# Patient Record
Sex: MALE | Race: WHITE | NOT HISPANIC OR LATINO | Employment: OTHER | URBAN - METROPOLITAN AREA
[De-identification: names, ages, dates, MRNs, and addresses within clinical notes are randomized per-mention and may not be internally consistent; named-entity substitution may affect disease eponyms.]

---

## 2017-01-05 ENCOUNTER — TRANSFERRED RECORDS (OUTPATIENT)
Dept: HEALTH INFORMATION MANAGEMENT | Facility: CLINIC | Age: 37
End: 2017-01-05

## 2017-01-09 ENCOUNTER — OFFICE VISIT (OUTPATIENT)
Dept: FAMILY MEDICINE | Facility: CLINIC | Age: 37
End: 2017-01-09
Payer: COMMERCIAL

## 2017-01-09 VITALS
HEART RATE: 70 BPM | TEMPERATURE: 97.8 F | SYSTOLIC BLOOD PRESSURE: 120 MMHG | DIASTOLIC BLOOD PRESSURE: 74 MMHG | OXYGEN SATURATION: 97 % | WEIGHT: 315 LBS | HEIGHT: 72 IN | BODY MASS INDEX: 42.66 KG/M2

## 2017-01-09 DIAGNOSIS — E66.01 MORBID OBESITY DUE TO EXCESS CALORIES (H): ICD-10-CM

## 2017-01-09 DIAGNOSIS — F70 MILD INTELLECTUAL DISABILITY: ICD-10-CM

## 2017-01-09 DIAGNOSIS — F17.200 TOBACCO USE DISORDER: ICD-10-CM

## 2017-01-09 DIAGNOSIS — I10 HYPERTENSION GOAL BP (BLOOD PRESSURE) < 140/90: ICD-10-CM

## 2017-01-09 DIAGNOSIS — G47.33 OSA (OBSTRUCTIVE SLEEP APNEA): ICD-10-CM

## 2017-01-09 DIAGNOSIS — Z01.818 PREOP GENERAL PHYSICAL EXAM: Primary | ICD-10-CM

## 2017-01-09 LAB
ALBUMIN SERPL-MCNC: 3.5 G/DL (ref 3.4–5)
ALP SERPL-CCNC: 67 U/L (ref 40–150)
ALT SERPL W P-5'-P-CCNC: 24 U/L (ref 0–70)
ANION GAP SERPL CALCULATED.3IONS-SCNC: 5 MMOL/L (ref 3–14)
AST SERPL W P-5'-P-CCNC: 5 U/L (ref 0–45)
BILIRUB SERPL-MCNC: 0.4 MG/DL (ref 0.2–1.3)
BUN SERPL-MCNC: 14 MG/DL (ref 7–30)
CALCIUM SERPL-MCNC: 9 MG/DL (ref 8.5–10.1)
CHLORIDE SERPL-SCNC: 106 MMOL/L (ref 94–109)
CO2 SERPL-SCNC: 29 MMOL/L (ref 20–32)
CREAT SERPL-MCNC: 1.06 MG/DL (ref 0.66–1.25)
ERYTHROCYTE [DISTWIDTH] IN BLOOD BY AUTOMATED COUNT: 13.7 % (ref 10–15)
GFR SERPL CREATININE-BSD FRML MDRD: 79 ML/MIN/1.7M2
GLUCOSE SERPL-MCNC: 83 MG/DL (ref 70–99)
HCT VFR BLD AUTO: 38.7 % (ref 40–53)
HGB BLD-MCNC: 12.9 G/DL (ref 13.3–17.7)
INR PPP: 0.92 (ref 0.86–1.14)
MCH RBC QN AUTO: 28.9 PG (ref 26.5–33)
MCHC RBC AUTO-ENTMCNC: 33.3 G/DL (ref 31.5–36.5)
MCV RBC AUTO: 87 FL (ref 78–100)
PLATELET # BLD AUTO: 244 10E9/L (ref 150–450)
POTASSIUM SERPL-SCNC: 4 MMOL/L (ref 3.4–5.3)
PROT SERPL-MCNC: 7.1 G/DL (ref 6.8–8.8)
RBC # BLD AUTO: 4.47 10E12/L (ref 4.4–5.9)
SODIUM SERPL-SCNC: 140 MMOL/L (ref 133–144)
WBC # BLD AUTO: 10.1 10E9/L (ref 4–11)

## 2017-01-09 PROCEDURE — 80053 COMPREHEN METABOLIC PANEL: CPT | Performed by: FAMILY MEDICINE

## 2017-01-09 PROCEDURE — 85610 PROTHROMBIN TIME: CPT | Performed by: FAMILY MEDICINE

## 2017-01-09 PROCEDURE — 85027 COMPLETE CBC AUTOMATED: CPT | Performed by: FAMILY MEDICINE

## 2017-01-09 PROCEDURE — 99214 OFFICE O/P EST MOD 30 MIN: CPT | Performed by: FAMILY MEDICINE

## 2017-01-09 PROCEDURE — 93000 ELECTROCARDIOGRAM COMPLETE: CPT | Performed by: FAMILY MEDICINE

## 2017-01-09 PROCEDURE — 36415 COLL VENOUS BLD VENIPUNCTURE: CPT | Performed by: FAMILY MEDICINE

## 2017-01-09 NOTE — NURSING NOTE
Chief Complaint   Patient presents with     Pre-Op Exam       Initial /74 mmHg  Pulse 70  Temp(Src) 97.8  F (36.6  C) (Oral)  Ht 6' (1.829 m)  Wt 332 lb 0.2 oz (150.599 kg)  BMI 45.02 kg/m2  SpO2 97% Estimated body mass index is 45.02 kg/(m^2) as calculated from the following:    Height as of this encounter: 6' (1.829 m).    Weight as of this encounter: 332 lb 0.2 oz (150.599 kg).      Deepali Galvan [facility]    Health Maintenance- Reviewed.

## 2017-01-09 NOTE — MR AVS SNAPSHOT
After Visit Summary   1/9/2017    Kimo Greenwood    MRN: [id number]           Patient Information     Date Of Birth          1980        Visit Information        Provider Department      1/9/2017 8:00 AM Kory Hudson MD [facility]        Today's Diagnoses     Preop general physical exam    -  1       Care Instructions      Before Your Surgery      Call your surgeon if there is any change in your health. This includes signs of a cold or flu (such as a sore throat, runny nose, cough, rash or fever).    Do not smoke, drink alcohol or take over the counter medicine (unless your surgeon or primary care doctor tells you to) for the 24 hours before and after surgery.    If you take prescribed drugs: Follow your doctor s orders about which medicines to take and which to stop until after surgery.    Eating and drinking prior to surgery: follow the instructions from your surgeon    Take a shower or bath the night before surgery. Use the soap your surgeon gave you to gently clean your skin. If you do not have soap from your surgeon, use your regular soap. Do not shave or scrub the surgery site.  Wear clean pajamas and have clean sheets on your bed.     1 week no aspirin    5 days no ibuprofen/naproxen/Aleve    Bring CPAP with you    Hold lisinopril day of surgery        Follow-ups after your visit        Your next 10 appointments already scheduled     Jan 25, 2017   Procedure with Bala Randall MD   [facility] (--)    [address] [facility] [address]   [phone]            Mar 30, 2017  7:30 AM   (Arrive by 7:15 AM)   Return Visit with LAMBERT Hills CNP   Colon and Rectal Surgery ([facility])    [address] [facility] [address] [phone]              Who to contact     If you have questions or need follow up information about today's clinic visit or your  schedule please contact [facility] directly at [phone].  Normal or non-critical lab and imaging results will be communicated to you by MyChart, letter or phone within 4 business days after the clinic has received the results. If you do not hear from us within 7 days, please contact the clinic through DaWandahart or phone. If you have a critical or abnormal lab result, we will notify you by phone as soon as possible.  Submit refill requests through Luminate or call your pharmacy and they will forward the refill request to us. Please allow 3 business days for your refill to be completed.          Additional Information About Your Visit        DaWandaharAnyMeeting Information     Luminate gives you secure access to your electronic health record. If you see a primary care provider, you can also send messages to your care team and make appointments. If you have questions, please call your primary care clinic.  If you do not have a primary care provider, please call [phone] and they will assist you.        Care EveryWhere ID     This is your Care EveryWhere ID. This could be used by other organizations to access your [facility] medical records  [id number]        Your Vitals Were     Pulse Temperature Height BMI (Body Mass Index) Pulse Oximetry       70 97.8  F (36.6  C) (Oral) 6' (1.829 m) 45.02 kg/m2 97%        Blood Pressure from Last 3 Encounters:   01/09/17 120/74   12/29/16 121/72   12/27/16 115/70    Weight from Last 3 Encounters:   01/09/17 332 lb 0.2 oz (150.599 kg)   12/29/16 330 lb 6.4 oz (149.868 kg)   12/27/16 328 lb (148.78 kg)              We Performed the Following     CBC with platelets     Comprehensive metabolic panel     EKG 12-lead complete w/read - Clinics     INR          Today's Medication Changes          These changes are accurate as of: 1/9/17  8:49 AM.  If you have any questions, ask your nurse or doctor.               These medicines have changed or have updated prescriptions.         Dose/Directions    ARIPiprazole 5 MG tablet   Commonly known as:  ABILIFY   This may have changed:  when to take this   Used for:  Major depression, recurrent (H)        Dose:  5 mg   Take 1 tablet (5 mg) by mouth At Bedtime   Quantity:  30 tablet   Refills:  1       cholecalciferol 5000 UNITS Caps capsule   Commonly known as:  vitamin D3   This may have changed:  when to take this   Used for:  Vitamin D deficiency        Dose:  5000 Units   Take 1 capsule (5,000 Units) by mouth daily   Quantity:  30 capsule   Refills:  11       FLUoxetine 20 MG capsule   Commonly known as:  PROzac   This may have changed:  when to take this   Used for:  Major depression, recurrent (H)        Dose:  20 mg   Take 1 capsule (20 mg) by mouth At Bedtime   Quantity:  30 capsule   Refills:  1       fluticasone 50 MCG/ACT spray   Commonly known as:  FLONASE   This may have changed:  when to take this   Used for:  Cough        Dose:  2 spray   Spray 2 sprays into both nostrils daily   Quantity:  1 Bottle   Refills:  3       lisinopril 10 MG tablet   Commonly known as:  PRINIVIL/ZESTRIL   This may have changed:  when to take this   Used for:  Hypertension goal BP (blood pressure) < 140/90        Dose:  10 mg   Take 1 tablet (10 mg) by mouth daily   Quantity:  90 tablet   Refills:  4                Primary Care Provider Office Phone # Fax #    Kory Hudson [phone]       [facility] [address] [address] [facility] [address]        Thank you!     Thank you for choosing [facility]  for your care. Our goal is always to provide you with excellent care. Hearing back from our patients is one way we can continue to improve our services. Please take a few minutes to complete the written survey that you may receive in the mail after your visit with us. Thank you!             Your Updated Medication List - Protect others around you: Learn how to safely use, store and throw away your medicines at  www.disposemymeds.org.          This list is accurate as of: 1/9/17  8:49 AM.  Always use your most recent med list.                   Brand Name Dispense Instructions for use    ARIPiprazole 5 MG tablet    ABILIFY    30 tablet    Take 1 tablet (5 mg) by mouth At Bedtime       cholecalciferol 5000 UNITS Caps capsule    vitamin D3    30 capsule    Take 1 capsule (5,000 Units) by mouth daily       desonide 0.05 % cream    DESOWEN    15 g    Apply sparingly to affected area three times daily for 14 days.       FLUoxetine 20 MG capsule    PROzac    30 capsule    Take 1 capsule (20 mg) by mouth At Bedtime       fluticasone 50 MCG/ACT spray    FLONASE    1 Bottle    Spray 2 sprays into both nostrils daily       HYDROcodone-acetaminophen 5-325 MG per tablet    NORCO    20 tablet    Take 1 tablet by mouth every 4 hours as needed for moderate to severe pain       ketoconazole 2 % shampoo    NIZORAL    120 mL    Apply to the scalp and beard two times per week and wash off after 5 minutes.       lisinopril 10 MG tablet    PRINIVIL/ZESTRIL    90 tablet    Take 1 tablet (10 mg) by mouth daily       mometasone-formoterol 100-5 MCG/ACT oral inhaler    DULERA    1 Inhaler    Inhale 2 puffs into the lungs 2 times daily       ondansetron 4 MG ODT tab    ZOFRAN ODT    15 tablet    Take 1 tablet (4 mg) by mouth every 8 hours as needed for nausea       * order for DME     1 each    auto CPAP 7-15 cm/h20       * order for DME     1 Units    Equipment being ordered: CPAP mask and tubing       pantoprazole 40 MG EC tablet    PROTONIX    30 tablet    Take 1 tablet (40 mg) by mouth daily Take 30-60 minutes before first meal of the day       vitamin D 91396 UNIT capsule    ERGOCALCIFEROL    8 capsule    Take one twice weekly for 12 weeks. Then start Vitamin D 2 or Vitamin D 3 5000 units daily. Recheck after 3 to 6 months.       * Notice:  This list has 2 medication(s) that are the same as other medications prescribed for you. Read the  directions carefully, and ask your doctor or other care provider to review them with you.

## 2017-01-09 NOTE — PROGRESS NOTES
[facility]  [address] [place] [address]  [phone]  Dept: [phone]    PRE-OP EVALUATION:  Today's date: 2017    Kimo Greenwood (: 1980) presents for pre-operative evaluation assessment as requested by   .  He requires evaluation and anesthesia risk assessment prior to undergoing surgery/procedure for treatment of ARTHROPLASTY HIP .  Proposed procedure: left total hip arthroplasty     Date of Surgery/ Procedure: 17  Time of Surgery/ Procedure: [address] [place] [address]/Surgical Facility: [facility]     Primary Physician: Kory Hudson  Type of Anesthesia Anticipated: to be determined    Patient has a Health Care Directive or Living Will:  NO    1. NO - Do you have a history of heart attack, stroke, stent, bypass or surgery on an artery in the head, neck, heart or legs?  2. NO - Do you ever have any pain or discomfort in your chest?  3. NO - Do you have a history of  Heart Failure?  4. NO - Are you troubled by shortness of breath when: walking on the level, up a slight hill or at night?  5. NO - Do you currently have a cold, bronchitis or other respiratory infection?  6. NO - Do you have a cough, shortness of breath or wheezing?  7. NO - Do you sometimes get pains in the calves of your legs when you walk?  8. NO - Do you or anyone in your family have previous history of blood clots?  9. NO - Do you or does anyone in your family have a serious bleeding problem such as prolonged bleeding following surgeries or cuts?  10. NO - Have you ever had problems with anemia or been told to take iron pills?  11. NO - Have you had any abnormal blood loss such as black, tarry or bloody stools, or abnormal vaginal bleeding?  12. NO - Have you ever had a blood transfusion?  13. NO - Have you or any of your relatives ever had problems with anesthesia?  14. Yes- Do you have sleep apnea, excessive snoring or daytime drowsiness?  15. NO - Do you have any prosthetic heart  valves?  16. NO - Do you have prosthetic joints?  17. NO - Is there any chance that you may be pregnant?      HPI:                                                      Brief HPI related to upcoming procedure: Undergoing left hip total arthroplasty    History of obstructive sleep apnea, getting back on CPAP as he has been off recently.    Patient with morbid obesity.    Patient with cough and occasional shortness of breath that has been improved with inhaler, moderate obstructive airway defect on recent spirometry and appears to have asthma improving with inhaler.    Hypertension currently well controlled with lisinopril.  Denies shortness or breath, chest pain, headache, vision change, or lower extremity edema.    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 Next colonoscopy 1-3 years. (was done at [facility], sedation)       Bilateral low back pain with left-sided sciatica 10/08/2015     Priority: Medium     Chronic low back pain 10/06/2015     Priority: Medium     Suicidal ideation 08/23/2015     Priority: Medium     Leukocytosis 06/09/2014     Priority: Medium     Morbid obesity (H) 05/20/2014     Priority: Medium     Hypertension goal BP (blood pressure) < 140/90 02/03/2014     Priority: Medium     Insomnia 05/14/2013     Priority: Medium     LOREN (obstructive sleep apnea) 04/23/2013     Priority: Medium     CPAP       Moderate major depression (H) 01/15/2013     Priority: Medium     Speech/language delay 11/11/2010     Priority: Medium     Tobacco use disorder 11/11/2010     Priority: Medium     Nocturnal enuresis      Priority: Medium      Past Medical History   Diagnosis Date     Obesity 11/11/2010     MDD (major depressive disorder)      Mild mental  retardation (I.Q. 50-70) 11/11/2010     With associated speech/language delay     Marijuana abuse      Hypertension      Leukocytosis 6/9/2014     LOREN (obstructive sleep apnea)      Seborrheic dermatitis      Past Surgical History   Procedure Laterality Date     Implant stimulator and leads sacral nerve (stage one and two)  6/27/2011     [facility] ?     Colonoscopy       Genitourinary surgery       bladder, [place], [facility]     Orthopedic surgery       Colonoscopy N/A 10/30/2015     Procedure: COMBINED COLONOSCOPY, SINGLE OR MULTIPLE BIOPSY/POLYPECTOMY BY BIOPSY;  Surgeon: Alexis Jackson MD;  Location:  GI     Esophagoscopy, gastroscopy, duodenoscopy (egd), combined N/A 11/17/2016     Procedure: COMBINED ESOPHAGOSCOPY, GASTROSCOPY, DUODENOSCOPY (EGD), BIOPSY SINGLE OR MULTIPLE;  Surgeon: Cat Huber MD;  Location:  GI     Colonoscopy N/A 11/17/2016     Procedure: COMBINED COLONOSCOPY, SINGLE OR MULTIPLE BIOPSY/POLYPECTOMY BY BIOPSY;  Surgeon: Cat Huber MD;  Location:  GI     Exam under anesthesia, fulgurate condyloma anus, combined N/A 12/22/2016     Procedure: COMBINED EXAM UNDER ANESTHESIA, FULGURATE CONDYLOMA ANUS;  Surgeon: Nya Cabello MD;  Location:  OR     Current Outpatient Prescriptions   Medication Sig Dispense Refill     pantoprazole (PROTONIX) 40 MG EC tablet Take 1 tablet (40 mg) by mouth daily Take 30-60 minutes before first meal of the day 30 tablet 3     mometasone-formoterol (DULERA) 100-5 MCG/ACT oral inhaler Inhale 2 puffs into the lungs 2 times daily 1 Inhaler 3     HYDROcodone-acetaminophen (NORCO) 5-325 MG per tablet Take 1 tablet by mouth every 4 hours as needed for moderate to severe pain 20 tablet 0     order for DME Equipment being ordered: CPAP mask and tubing 1 Units 0     ketoconazole (NIZORAL) 2 % shampoo Apply to the scalp and beard two times per week and wash off after 5 minutes. 120 mL 11     desonide (DESOWEN) 0.05 % cream  Apply sparingly to affected area three times daily for 14 days. 15 g 1     fluticasone (FLONASE) 50 MCG/ACT nasal spray Spray 2 sprays into both nostrils daily (Patient taking differently: Spray 2 sprays into both nostrils every morning ) 1 Bottle 3     ondansetron (ZOFRAN ODT) 4 MG disintegrating tablet Take 1 tablet (4 mg) by mouth every 8 hours as needed for nausea 15 tablet 0     vitamin D (ERGOCALCIFEROL) 68484 UNIT capsule Take one twice weekly for 12 weeks. Then start Vitamin D 2 or Vitamin D 3 5000 units daily. Recheck after 3 to 6 months. 8 capsule 2     cholecalciferol (VITAMIN D3) 5000 UNITS CAPS capsule Take 1 capsule (5,000 Units) by mouth daily (Patient taking differently: Take 5,000 Units by mouth every morning ) 30 capsule 11     lisinopril (PRINIVIL,ZESTRIL) 10 MG tablet Take 1 tablet (10 mg) by mouth daily (Patient taking differently: Take 10 mg by mouth every morning ) 90 tablet 4     FLUoxetine (PROZAC) 20 MG capsule Take 1 capsule (20 mg) by mouth At Bedtime (Patient taking differently: Take 20 mg by mouth every morning ) 30 capsule 1     ARIPiprazole (ABILIFY) 5 MG tablet Take 1 tablet (5 mg) by mouth At Bedtime (Patient taking differently: Take 5 mg by mouth every morning ) 30 tablet 1     ORDER FOR DME auto CPAP 7-15 cm/h20 1 each 0     [DISCONTINUED] ORDER FOR DME auto CPAP 7-15 cm/h20 1 each 0     OTC products: Aspirin and NSAIDS    Allergies   Allergen Reactions     No Clinical Screening - See Comments Other (See Comments)     Awoke from anesthesia with anger and ripping off dressing, after interstim placement, outside hospital 2013      Latex Allergy: NO    Social History   Substance Use Topics     Smoking status: Heavy Tobacco Smoker -- 0.50 packs/day for 1 years     Types: Cigarettes     Smokeless tobacco: Never Used      Comment: Smokes E Cigs equal to 1 PPD     Alcohol Use: Yes      Comment: socially     History   Drug Use     Yes     Special: Marijuana     Comment: marijuana 1-2  times/week     REVIEW OF SYSTEMS:                                                    C: NEGATIVE for fever, chills, change in weight  E/M: NEGATIVE for ear, mouth and throat problems  R: NEGATIVE for significant cough or SOB  CV: NEGATIVE for chest pain, palpitations or peripheral edema    EXAM:                                                    /74 mmHg  Pulse 70  Temp(Src) 97.8  F (36.6  C) (Oral)  Ht 6' (1.829 m)  Wt 332 lb 0.2 oz (150.599 kg)  BMI 45.02 kg/m2  SpO2 97%  GENERAL APPEARANCE: alert, no distress and obese  HENT: ear canals and TM's normal and nose and mouth without ulcers or lesions  RESP: lungs clear to auscultation - no rales, rhonchi or wheezes  CV: regular rate and rhythm, normal S1 S2, no S3 or S4 and no murmur, click or rub   ABDOMEN: soft, nontender, no HSM or masses and bowel sounds normal  NEURO: Normal strength and tone, sensory exam grossly normal, mentation intact and speech normal    DIAGNOSTICS:                                                    EKG: Normal Sinus Rhythm, incomplete RBBB, normal intervals, no acute ST/T changes c/w ischemia, no LVH by voltage criteria, unchanged from previous tracings    Labs Resulted Today:   Results for orders placed or performed in visit on 01/09/17   CBC with platelets   Result Value Ref Range    WBC 10.1 4.0 - 11.0 10e9/L    RBC Count 4.47 4.4 - 5.9 10e12/L    Hemoglobin 12.9 (L) 13.3 - 17.7 g/dL    Hematocrit 38.7 (L) 40.0 - 53.0 %    MCV 87 78 - 100 fl    MCH 28.9 26.5 - 33.0 pg    MCHC 33.3 31.5 - 36.5 g/dL    RDW 13.7 10.0 - 15.0 %    Platelet Count 244 150 - 450 10e9/L   INR   Result Value Ref Range    INR 0.92 0.86 - 1.14   Comprehensive metabolic panel   Result Value Ref Range    Sodium 140 133 - 144 mmol/L    Potassium 4.0 3.4 - 5.3 mmol/L    Chloride 106 94 - 109 mmol/L    Carbon Dioxide 29 20 - 32 mmol/L    Anion Gap 5 3 - 14 mmol/L    Glucose 83 70 - 99 mg/dL    Urea Nitrogen 14 7 - 30 mg/dL    Creatinine 1.06 0.66 - 1.25  mg/dL    GFR Estimate 79 >60 mL/min/1.7m2    GFR Estimate If Black >90   GFR Calc   >60 mL/min/1.7m2    Calcium 9.0 8.5 - 10.1 mg/dL    Bilirubin Total 0.4 0.2 - 1.3 mg/dL    Albumin 3.5 3.4 - 5.0 g/dL    Protein Total 7.1 6.8 - 8.8 g/dL    Alkaline Phosphatase 67 40 - 150 U/L    ALT 24 0 - 70 U/L    AST 5 0 - 45 U/L       Recent Labs   Lab Test  10/31/16   1546  10/17/16   2208  01/25/16   1541   08/01/15   0958   HGB  14.8  15.0  14.8   < >  15.2   PLT  278  250  268   < >  274   NA   --   139  140   < >  140   POTASSIUM   --   4.2  4.4   < >  3.8   CR   --   0.89  0.75   < >  0.79   A1C   --    --   4.9   --   5.1    < > = values in this interval not displayed.        IMPRESSION:                                                    Reason for surgery/procedure: Hip arthritis  Diagnosis/reason for consult: preoperative evaluation for assessment of cardiovascular and respiratory disease as well as overall risk assessment and perioperative medical management.    The proposed surgical procedure is considered INTERMEDIATE risk.    REVISED CARDIAC RISK INDEX  The patient has the following serious cardiovascular risks for perioperative complications such as (MI, PE, VFib and 3  AV Block):  No serious cardiac risks  INTERPRETATION: 0 risks: Class I (very low risk - 0.4% complication rate)    The patient has the following additional risks for perioperative complications:  Morbid obesity      ICD-10-CM    1. Preop general physical exam Z01.818 EKG 12-lead complete w/read - Clinics     CBC with platelets     INR     Comprehensive metabolic panel   2. Morbid obesity due to excess calories (H) E66.01    3. Hypertension goal BP (blood pressure) < 140/90 I10    4. LOREN (obstructive sleep apnea) G47.33    5. Tobacco use disorder F17.200    6. Mild intellectual disability F70      RECOMMENDATIONS:                                                        Cardiovascular Risk  Performs 4 METs exercise without symptoms  (Climb a flight of stairs and Walk on level ground at 15 minutes per mile (4 miles/hour)) .       Obstructive Sleep Apnea (or suspected sleep apnea)  Patient is to bring their home CPAP with them on the day of surgery      --Patient is to take all scheduled medications on the day of surgery EXCEPT for modifications listed below.    Anticoagulant or Antiplatelet Medication Use  ASPIRIN: Discontinue ASA 7-10 days prior to procedure to reduce bleeding risk.  It should be resumed post-operatively.  NSAIDS: Ibuprofen (Motrin): Stop 5 days prior to surgery        ACE Inhibitor or Angiotensin Receptor Blocker (ARB) Use  Ace inhibitor or Angiotensin Receptor Blocker (ARB) and should HOLD this medication for the 24 hours prior to surgery.      APPROVAL GIVEN to proceed with proposed procedure, without further diagnostic evaluation       Signed Electronically by: Kory Hudson MD    Copy of this evaluation report is provided to requesting physician.    [place] Preop Guidelines

## 2017-01-09 NOTE — PATIENT INSTRUCTIONS
Before Your Surgery      Call your surgeon if there is any change in your health. This includes signs of a cold or flu (such as a sore throat, runny nose, cough, rash or fever).    Do not smoke, drink alcohol or take over the counter medicine (unless your surgeon or primary care doctor tells you to) for the 24 hours before and after surgery.    If you take prescribed drugs: Follow your doctor s orders about which medicines to take and which to stop until after surgery.    Eating and drinking prior to surgery: follow the instructions from your surgeon    Take a shower or bath the night before surgery. Use the soap your surgeon gave you to gently clean your skin. If you do not have soap from your surgeon, use your regular soap. Do not shave or scrub the surgery site.  Wear clean pajamas and have clean sheets on your bed.     1 week no aspirin    5 days no ibuprofen/naproxen/Aleve    Bring CPAP with you    Hold lisinopril day of surgery

## 2017-01-13 ENCOUNTER — MYC MEDICAL ADVICE (OUTPATIENT)
Dept: FAMILY MEDICINE | Facility: CLINIC | Age: 37
End: 2017-01-13

## 2017-01-13 NOTE — TELEPHONE ENCOUNTER
Ok for this as requested - can you call to start process and I can talk when needed if they need an MD on line.  Can call next week when I am present.

## 2017-01-17 NOTE — TELEPHONE ENCOUNTER
Called and spoke with Nina at [facility] -   She submitted extension paperwork as requested below.      Eliana Concepcion RN

## 2017-01-19 ENCOUNTER — HOSPITAL ENCOUNTER (OUTPATIENT)
Dept: LAB | Facility: CLINIC | Age: 37
Discharge: HOME | End: 2017-01-19
Attending: ORTHOPAEDIC SURGERY | Admitting: ORTHOPAEDIC SURGERY
Payer: COMMERCIAL

## 2017-01-19 DIAGNOSIS — Z01.812 PRE-OPERATIVE LABORATORY EXAMINATION: ICD-10-CM

## 2017-01-19 LAB
MRSA DNA SPEC QL NAA+PROBE: NORMAL
SPECIMEN SOURCE: NORMAL

## 2017-01-19 PROCEDURE — 87641 MR-STAPH DNA AMP PROBE: CPT | Performed by: ORTHOPAEDIC SURGERY

## 2017-01-19 PROCEDURE — 40000829 ZZHCL STATISTIC STAPH AUREUS SUSCEPT SCREEN PCR: Performed by: ORTHOPAEDIC SURGERY

## 2017-01-19 PROCEDURE — 87640 STAPH A DNA AMP PROBE: CPT | Performed by: ORTHOPAEDIC SURGERY

## 2017-01-19 PROCEDURE — 40000830 ZZHCL STATISTIC STAPH AUREUS METH RESIST SCREEN PCR: Performed by: ORTHOPAEDIC SURGERY

## 2017-01-19 NOTE — TELEPHONE ENCOUNTER
[facility] faxed in asking for us to verify each diagnosis, provide them probable duration of condition, frequency & duration for each dx.  Eliana(RN) sent in stating request through 05/2017, pt has hip surgery scheduled on 01/25/17, mother(Dalia) has requested increase in [facility] hours through 05/31/17.  Request 12 hours per week, 3 days per week up to 4 hours per day.  Placed form in [place] bin just incase we need to review.  Fatou Pantoja

## 2017-01-20 ENCOUNTER — TELEPHONE (OUTPATIENT)
Dept: FAMILY MEDICINE | Facility: CLINIC | Age: 37
End: 2017-01-20

## 2017-01-20 NOTE — TELEPHONE ENCOUNTER
Patient's FMLA forms came back needed additional , the need to probable duration for each condition, and frequency and duration for each condition.   Letter started and will work with Dr. Hudson tg complete.  Fatou Pantoja

## 2017-01-20 NOTE — Clinical Note
[facility]          [address] [place] [address] [phone]      Re:Belen Ortega  [address] [place] [place] [address]    To Whom it May Concern,    Here is the information your requesting for Belen Ortega LA, if you have any questions please let me know.        Diagnosis   Probable duration  Frequency&Duration  Gastroenterology  3 months   12 hours per week, 3 days per week up to         4 hours a day      Orthopedics   2 months   12 hours per week, 3 days per week up to         4 hours a day     Pain Clinic   3 months   12 hours per week, 3 days per week up to         4 hours a day     Urology   Lifetime   12 hours per week, 3 days per week up to         4 hours a day     General Surgery  3 months    12 hours per week, 3 days per week up to         4 hours a day         Sincerely,      Kory Hudson MD

## 2017-01-23 ENCOUNTER — MYC MEDICAL ADVICE (OUTPATIENT)
Dept: FAMILY MEDICINE | Facility: CLINIC | Age: 37
End: 2017-01-23

## 2017-01-23 DIAGNOSIS — M54.50 CHRONIC LOW BACK PAIN: ICD-10-CM

## 2017-01-23 DIAGNOSIS — N39.44 NOCTURNAL ENURESIS: Primary | ICD-10-CM

## 2017-01-23 DIAGNOSIS — G89.29 CHRONIC LOW BACK PAIN: ICD-10-CM

## 2017-01-25 ENCOUNTER — ANESTHESIA (OUTPATIENT)
Dept: SURGERY | Facility: CLINIC | Age: 37
DRG: 470 | End: 2017-01-25
Payer: COMMERCIAL

## 2017-01-25 ENCOUNTER — APPOINTMENT (OUTPATIENT)
Dept: PHYSICAL THERAPY | Facility: CLINIC | Age: 37
DRG: 470 | End: 2017-01-25
Attending: ORTHOPAEDIC SURGERY
Payer: COMMERCIAL

## 2017-01-25 ENCOUNTER — APPOINTMENT (OUTPATIENT)
Dept: GENERAL RADIOLOGY | Facility: CLINIC | Age: 37
DRG: 470 | End: 2017-01-25
Attending: ORTHOPAEDIC SURGERY
Payer: COMMERCIAL

## 2017-01-25 ENCOUNTER — ANESTHESIA EVENT (OUTPATIENT)
Dept: SURGERY | Facility: CLINIC | Age: 37
DRG: 470 | End: 2017-01-25
Payer: COMMERCIAL

## 2017-01-25 ENCOUNTER — MYC MEDICAL ADVICE (OUTPATIENT)
Dept: FAMILY MEDICINE | Facility: CLINIC | Age: 37
End: 2017-01-25

## 2017-01-25 PROBLEM — Z96.649 S/P TOTAL HIP ARTHROPLASTY: Status: ACTIVE | Noted: 2017-01-25

## 2017-01-25 PROCEDURE — 40000940 XR HIP PORT LT 1 VW

## 2017-01-25 PROCEDURE — 25000125 ZZHC RX 250: Performed by: NURSE ANESTHETIST, CERTIFIED REGISTERED

## 2017-01-25 PROCEDURE — 25000125 ZZHC RX 250: Performed by: PHYSICIAN ASSISTANT

## 2017-01-25 PROCEDURE — 25800025 ZZH RX 258: Performed by: NURSE ANESTHETIST, CERTIFIED REGISTERED

## 2017-01-25 PROCEDURE — 40000940 XR PELVIS AD HIP PORTABLE RIGHT 1 VIEW

## 2017-01-25 PROCEDURE — 25000128 H RX IP 250 OP 636: Performed by: NURSE ANESTHETIST, CERTIFIED REGISTERED

## 2017-01-25 RX ORDER — ONDANSETRON 2 MG/ML
INJECTION INTRAMUSCULAR; INTRAVENOUS PRN
Status: DISCONTINUED | OUTPATIENT
Start: 2017-01-25 | End: 2017-01-25

## 2017-01-25 RX ORDER — SODIUM CHLORIDE, SODIUM LACTATE, POTASSIUM CHLORIDE, CALCIUM CHLORIDE 600; 310; 30; 20 MG/100ML; MG/100ML; MG/100ML; MG/100ML
INJECTION, SOLUTION INTRAVENOUS CONTINUOUS PRN
Status: DISCONTINUED | OUTPATIENT
Start: 2017-01-25 | End: 2017-01-25

## 2017-01-25 RX ORDER — GLYCOPYRROLATE 0.2 MG/ML
INJECTION, SOLUTION INTRAMUSCULAR; INTRAVENOUS PRN
Status: DISCONTINUED | OUTPATIENT
Start: 2017-01-25 | End: 2017-01-25

## 2017-01-25 RX ORDER — FENTANYL CITRATE 50 UG/ML
INJECTION, SOLUTION INTRAMUSCULAR; INTRAVENOUS PRN
Status: DISCONTINUED | OUTPATIENT
Start: 2017-01-25 | End: 2017-01-25

## 2017-01-25 RX ORDER — DEXAMETHASONE SODIUM PHOSPHATE 4 MG/ML
INJECTION, SOLUTION INTRA-ARTICULAR; INTRALESIONAL; INTRAMUSCULAR; INTRAVENOUS; SOFT TISSUE PRN
Status: DISCONTINUED | OUTPATIENT
Start: 2017-01-25 | End: 2017-01-25

## 2017-01-25 RX ORDER — NEOSTIGMINE METHYLSULFATE 1 MG/ML
VIAL (ML) INJECTION PRN
Status: DISCONTINUED | OUTPATIENT
Start: 2017-01-25 | End: 2017-01-25

## 2017-01-25 RX ORDER — PROPOFOL 10 MG/ML
INJECTION, EMULSION INTRAVENOUS PRN
Status: DISCONTINUED | OUTPATIENT
Start: 2017-01-25 | End: 2017-01-25

## 2017-01-25 RX ORDER — LIDOCAINE HYDROCHLORIDE 10 MG/ML
INJECTION, SOLUTION INFILTRATION; PERINEURAL PRN
Status: DISCONTINUED | OUTPATIENT
Start: 2017-01-25 | End: 2017-01-25

## 2017-01-25 RX ADMIN — ONDANSETRON 4 MG: 2 INJECTION INTRAMUSCULAR; INTRAVENOUS at 09:00

## 2017-01-25 RX ADMIN — SODIUM CHLORIDE, POTASSIUM CHLORIDE, SODIUM LACTATE AND CALCIUM CHLORIDE: 600; 310; 30; 20 INJECTION, SOLUTION INTRAVENOUS at 07:24

## 2017-01-25 RX ADMIN — Medication 1 G: at 07:45

## 2017-01-25 RX ADMIN — DEXAMETHASONE SODIUM PHOSPHATE 4 MG: 4 INJECTION, SOLUTION INTRAMUSCULAR; INTRAVENOUS at 07:35

## 2017-01-25 RX ADMIN — Medication 3 G: at 07:27

## 2017-01-25 RX ADMIN — ROCURONIUM BROMIDE 50 MG: 10 INJECTION INTRAVENOUS at 07:35

## 2017-01-25 RX ADMIN — MIDAZOLAM HYDROCHLORIDE 2 MG: 1 INJECTION, SOLUTION INTRAMUSCULAR; INTRAVENOUS at 07:24

## 2017-01-25 RX ADMIN — FENTANYL CITRATE 100 MCG: 50 INJECTION, SOLUTION INTRAMUSCULAR; INTRAVENOUS at 07:44

## 2017-01-25 RX ADMIN — Medication 4.5 MG: at 09:32

## 2017-01-25 RX ADMIN — LIDOCAINE HYDROCHLORIDE 50 MG: 10 INJECTION, SOLUTION INFILTRATION; PERINEURAL at 07:35

## 2017-01-25 RX ADMIN — ROCURONIUM BROMIDE 15 MG: 10 INJECTION INTRAVENOUS at 08:40

## 2017-01-25 RX ADMIN — GLYCOPYRROLATE 0.2 MG: 0.2 INJECTION, SOLUTION INTRAMUSCULAR; INTRAVENOUS at 07:35

## 2017-01-25 RX ADMIN — FENTANYL CITRATE 150 MCG: 50 INJECTION, SOLUTION INTRAMUSCULAR; INTRAVENOUS at 07:35

## 2017-01-25 RX ADMIN — SODIUM CHLORIDE, POTASSIUM CHLORIDE, SODIUM LACTATE AND CALCIUM CHLORIDE: 600; 310; 30; 20 INJECTION, SOLUTION INTRAVENOUS at 08:53

## 2017-01-25 RX ADMIN — PROPOFOL 250 MG: 10 INJECTION, EMULSION INTRAVENOUS at 07:35

## 2017-01-25 RX ADMIN — GLYCOPYRROLATE 0.8 MG: 0.2 INJECTION, SOLUTION INTRAMUSCULAR; INTRAVENOUS at 09:32

## 2017-01-25 RX ADMIN — ROCURONIUM BROMIDE 10 MG: 10 INJECTION INTRAVENOUS at 08:02

## 2017-01-25 RX ADMIN — SODIUM CHLORIDE, POTASSIUM CHLORIDE, SODIUM LACTATE AND CALCIUM CHLORIDE: 600; 310; 30; 20 INJECTION, SOLUTION INTRAVENOUS at 07:50

## 2017-01-25 RX ADMIN — HYDROMORPHONE HYDROCHLORIDE 1 MG: 1 INJECTION, SOLUTION INTRAMUSCULAR; INTRAVENOUS; SUBCUTANEOUS at 07:55

## 2017-01-25 RX ADMIN — HYDROMORPHONE HYDROCHLORIDE 1 MG: 1 INJECTION, SOLUTION INTRAMUSCULAR; INTRAVENOUS; SUBCUTANEOUS at 08:06

## 2017-01-25 NOTE — ANESTHESIA PREPROCEDURE EVALUATION
Anesthesia Evaluation     . Pt has had prior anesthetic. Type: General      ROS/MED HX    ENT/Pulmonary:     (+)sleep apnea, Intermittent asthma Treatment: Inhaler prn,  , . .    Neurologic:     (+)Developmental delay      Cardiovascular:     (+) hypertension----. : . . . :. .       METS/Exercise Tolerance:     Hematologic:  - neg hematologic  ROS       Musculoskeletal:   (+) arthritis, , , other musculoskeletal- djd hip      GI/Hepatic:  - neg GI/hepatic ROS       Renal/Genitourinary:  - ROS Renal section negative       Endo:     (+) Obesity, .      Psychiatric:     (+) psychiatric history other (comment)      Infectious Disease:  - neg infectious disease ROS       Malignancy:      - no malignancy   Other:    (+) No chance of pregnancy C-spine cleared: N/A, H/O Chronic Pain,no other significant disability              Physical Exam  Normal systems: cardiovascular, pulmonary and dental    Airway   Mallampati: II  TM distance: >3 FB  Neck ROM: full    Dental     Cardiovascular       Pulmonary                     Anesthesia Plan      History & Physical Review  History and physical reviewed and following examination; no interval change.    ASA Status:  3 .    NPO Status:  > 8 hours    Plan for General with Intravenous induction. Maintenance will be Balanced.    PONV prophylaxis:  Ondansetron (or other 5HT-3) and Dexamethasone or Solumedrol       Postoperative Care  Postoperative pain management:  IV analgesics.      Consents  Anesthetic plan, risks, benefits and alternatives discussed with:  Patient.  Use of blood products discussed: Yes.   Use of blood products discussed with Patient.  Consented to blood products.  .                          .

## 2017-01-25 NOTE — ANESTHESIA POSTPROCEDURE EVALUATION
Patient: Kimo Greenwood    ARTHROPLASTY HIP (Left Hip)  Additional InformationProcedure(s):  left total hip arthroplasty     - Wound Class: I-Clean    Diagnosis:left hip degenerative joint disease  Diagnosis Additional Information: left hip degenerative joint disease    Anesthesia Type:  General    Note:  Anesthesia Post Evaluation    Patient location during evaluation: PACU  Patient participation: Able to fully participate in evaluation  Level of consciousness: awake and alert  Pain management: adequate  Airway patency: patent  Cardiovascular status: acceptable  Respiratory status: acceptable  Hydration status: acceptable  PONV: controlled     Anesthetic complications: None          Last vitals:  Filed Vitals:    01/25/17 1404 01/25/17 1508 01/25/17 1525   BP: 128/69 141/71 152/77   Pulse: 64 66 83   Temp: 97.5  F (36.4  C)  97.5  F (36.4  C)   Resp: 16 16 16   SpO2: 96%  96%       Electronically Signed By: Semaj Cavazos MD  January 25, 2017  3:49 PM

## 2017-01-25 NOTE — ANESTHESIA CARE TRANSFER NOTE
Patient: Kimo Greenwood    ARTHROPLASTY HIP (Left Hip)  Additional InformationProcedure(s):  left total hip arthroplasty     - Wound Class: I-Clean    Diagnosis: left hip degenerative joint disease  Diagnosis Additional Information: No value filed.    Anesthesia Type:   General     Note:  Airway :Face Mask  Patient transferred to:PACU  Comments: To PACU, adequate gas exchange, report to RN.      Vitals: (Last set prior to Anesthesia Care Transfer)              Electronically Signed By: LAMBERT Sanford CRNA  January 25, 2017  9:57 AM

## 2017-01-26 ENCOUNTER — APPOINTMENT (OUTPATIENT)
Dept: OCCUPATIONAL THERAPY | Facility: CLINIC | Age: 37
DRG: 470 | End: 2017-01-26
Attending: ORTHOPAEDIC SURGERY
Payer: COMMERCIAL

## 2017-01-26 ENCOUNTER — APPOINTMENT (OUTPATIENT)
Dept: PHYSICAL THERAPY | Facility: CLINIC | Age: 37
DRG: 470 | End: 2017-01-26
Attending: ORTHOPAEDIC SURGERY
Payer: COMMERCIAL

## 2017-01-27 ENCOUNTER — APPOINTMENT (OUTPATIENT)
Dept: PHYSICAL THERAPY | Facility: CLINIC | Age: 37
DRG: 470 | End: 2017-01-27
Attending: ORTHOPAEDIC SURGERY
Payer: COMMERCIAL

## 2017-01-27 ENCOUNTER — APPOINTMENT (OUTPATIENT)
Dept: OCCUPATIONAL THERAPY | Facility: CLINIC | Age: 37
DRG: 470 | End: 2017-01-27
Attending: ORTHOPAEDIC SURGERY
Payer: COMMERCIAL

## 2017-01-27 ENCOUNTER — MYC MEDICAL ADVICE (OUTPATIENT)
Dept: FAMILY MEDICINE | Facility: CLINIC | Age: 37
End: 2017-01-27

## 2017-01-27 DIAGNOSIS — E66.01 MORBID OBESITY DUE TO EXCESS CALORIES (H): ICD-10-CM

## 2017-01-27 DIAGNOSIS — Z96.642 STATUS POST TOTAL REPLACEMENT OF LEFT HIP: Primary | ICD-10-CM

## 2017-01-27 DIAGNOSIS — Z74.09 IMMOBILITY: ICD-10-CM

## 2017-01-27 NOTE — Clinical Note
[facility]  [id number] [place] [id number] [phone]          January 27, 2017    Kimo Greenwood                                                                                                                     [address] [place] [address]            To Whom it May Concern:    Kimo Greenwood (1980) is under my medical care and recently underwent hip surgery.  He will need a raised toilet seat due to morbid obesity with immobility after hip replacement          Sincerely,     Kory Hudson MD

## 2017-01-27 NOTE — TELEPHONE ENCOUNTER
Please advise on prescription request.  Please advise on note to send to [facility] about raised toilet seat (not sure if you want to send OV notes along with letter).  TCs can fax these to them    Anupama Ross RN, BSN

## 2017-01-27 NOTE — TELEPHONE ENCOUNTER
Please send letter to [facility] describing need for medical equipment ordered - morbid obesity with immobility after hip replacement.    Signed request below.

## 2017-01-28 ENCOUNTER — TELEPHONE (OUTPATIENT)
Dept: FAMILY MEDICINE | Facility: CLINIC | Age: 37
End: 2017-01-28

## 2017-02-02 ENCOUNTER — TELEPHONE (OUTPATIENT)
Dept: GASTROENTEROLOGY | Facility: CLINIC | Age: 37
End: 2017-02-02

## 2017-02-10 ENCOUNTER — TELEPHONE (OUTPATIENT)
Dept: FAMILY MEDICINE | Facility: CLINIC | Age: 37
End: 2017-02-10

## 2017-02-10 NOTE — TELEPHONE ENCOUNTER
Pt's mom calling -  Pt as swelling on the surgical leg.       Advised should call surgeon to see if they can see him.  If unable call back to clinic.     Mother expressed understanding and acceptance of the plan.  Pt had no further questions at this time.  Advised can call back to clinic at any time with concerns.     Eliana Concepcion RN

## 2017-02-16 ENCOUNTER — OFFICE VISIT (OUTPATIENT)
Dept: RHEUMATOLOGY | Facility: CLINIC | Age: 37
End: 2017-02-16
Payer: COMMERCIAL

## 2017-02-16 ENCOUNTER — TELEPHONE (OUTPATIENT)
Dept: PALLIATIVE MEDICINE | Facility: CLINIC | Age: 37
End: 2017-02-16

## 2017-02-16 VITALS
SYSTOLIC BLOOD PRESSURE: 143 MMHG | WEIGHT: 315 LBS | BODY MASS INDEX: 45.03 KG/M2 | DIASTOLIC BLOOD PRESSURE: 76 MMHG | HEART RATE: 75 BPM

## 2017-02-16 DIAGNOSIS — M51.369 DDD (DEGENERATIVE DISC DISEASE), LUMBAR: Primary | ICD-10-CM

## 2017-02-16 PROCEDURE — 99213 OFFICE O/P EST LOW 20 MIN: CPT | Performed by: INTERNAL MEDICINE

## 2017-02-16 NOTE — TELEPHONE ENCOUNTER
Pain Management Center Referral      1. Confirmed address with patient? Yes  2. Confirmed phone number with patient? Yes  3. Confirmed referring provider? Yes  4. Is the PCP the same as the referring provider? No  5. Has the patient been to any previous pain clinics? No  (If yes, send SATNAM with welcome letter)  6. Which insurance are we to bill for this appointment?  [employer]    7. Informed pt of cancellation (48 hour) policy? Yes    REGARDING OPIOID MEDICATIONS: We will always address appropriateness of opioid pain medications, but we generally will not automatically take on a prescribing role. When we do take on prescribing of opioids for chronic pain, it is in collaboration with the referring physician for an intermediate period of time (months), with an expectation that the primary physician or provider will assume the prescribing role if medications are effective at stable doses with demonstrated compliance. Therefore, please do not assume that your prescribing responsibilities end on the day of pain clinic consultation.  7. Informed pt of prescribing policy? Yes      8. Referring Provider: Kory Hudson

## 2017-02-16 NOTE — PATIENT INSTRUCTIONS
Stop Smoking instruction  Instruction is to provided on the importance of smoking cessation and its impact on current and future health  -alternative available to help  -making commitment and decision to quit  -the nature of nicotine addiction is discussed  -behavioral therapy is discussed and suggested  -nicotine replacement therapy is discussed

## 2017-02-16 NOTE — NURSING NOTE
Chief Complaint   Patient presents with     Consult     Dr. Hudson   lower back pain, arthritis       Initial /76  Pulse 75  Wt (!) 150.6 kg (332 lb)  BMI 45.03 kg/m2 Estimated body mass index is 45.03 kg/(m^2) as calculated from the following:    Height as of 1/25/17: 1.829 m (6').    Weight as of this encounter: 150.6 kg (332 lb).      Patient prefers to be contacted via at Home.   Okay to leave detailed message: Yes  Patient uses MyChart: Yes    Glo LARKIN LPN

## 2017-02-16 NOTE — PROGRESS NOTES
REFERRING PHYSICIAN:  Dr. Kory Hudson.       COPY TO:  Dr. Ijeoma Ann.      CHIEF COMPLAINT:  Back pain.      HISTORY:  Kimo Greenwood is a 36-year-old male who presents for evaluation of chronic back pain.  We start of our discussion by emphasizing that I do not treat chronic back pain in general, I only treat inflammatory back disorders.  Review his MRI; his CT scan from 11/07/2016, which in fact does show an early degenerative problem in his back characterized by Schmorl's nodes, which is really characteristic of Scheuermann's disease.  There is multilevel degenerative disk and joint disease at multiple levels.  Apparently he had an HLA-B27 test performed back in 2015; the results are characterized in the chart as positive for an inflammatory arthritis; however, this is in fact not what this test indicates.  More recent serologic workup in 2016 revealed a normal sed rate and negative rheumatoid factor and CCP antibody, negative FREDERICK.      The patient does not have a rash or psoriasis.  He does not have chronic diarrhea or history of inflammatory bowel disease.  There are no unexplained fevers, chills or sweats.      PAST MEDICAL HISTORY:  Status post hip arthroplasty, hypertension, morbid obesity, chronic back pain, major depression.      MEDICATIONS:   1.  Oxycodone.    2.  Abilify   3.  Fluoxetine 20 mg daily.   4.  Flonase.   5.  Protonix 40 mg daily.   6.  Dulera inhaler.   7.  Norco.      DRUG ALLERGIES:  None.      SOCIAL HISTORY:  He is smoker of half pack a day, does drink, uses medical marijuana.      FAMILY HISTORY:  Nobody in his family with rheumatoid arthritis or lupus.  Mother, however, does have ulcerative colitis.      PHYSICAL EXAMINATION:   VITAL SIGNS:  Blood pressure 143/76, pulse 75.    IN GENERAL:  He is in a wheelchair.    HEENT:  He is normocephalic and atraumatic.  Sclerae are clear and moist.  Oropharynx without lesions.   NECK:  Supple without lymphadenopathy.   LUNGS:  Clear.    HEART:  Regular.   JOINT EXAM:  There is no synovitis of the wrists, MCPs, or PIPs; nor of the elbows, shoulders, knees, or ankles.   SKIN:  Without lesions.      IMPRESSION:   1.  Degenerative back disease that is characterized as Scheuermann's disease, which is a genetically-based, early-progressive degenerative back disorder.   2.  The patient does not have an inflammatory arthritis that is related to his back.   3.  Positive HLA-B27 that most likely comes from her mother having ulcerative colitis and in and of itself is not diagnostic test of any particular disorder.  It should be noted that 6% of all Caucasians can have a positive/Northern Europeans can have a positive HLA-B27, whereas only 20-30% of them will have ankylosis spondylitis, psoriatic arthritis, ulcerative colitis or Crohn's disease or Lyly's syndrome.        RECOMMENDATIONS:   1.  Unfortunately we really do not have anything to offer him as this is not an inflammatory-based problem.   2.  We will refer him to the Pain Clinic for some pain management and defer other pain management back to his primary care physician.         RONN PATEL MD             D: 2017 12:44   T: 2017 14:32   MT: [id number]#[id number]      Name:     TERRA SUÁREZ   MRN:      [id number]        Account:      [id number]   :      1980           Visit Date:   2017      Document: [id number]       cc: TERRY Osborn MD

## 2017-02-16 NOTE — MR AVS SNAPSHOT
After Visit Summary   2/16/2017    Kimo Greenwood    MRN: [id number]           Patient Information     Date Of Birth          1980        Visit Information        Provider Department      2/16/2017 9:00 AM Prasanna Messina MD [facility]        Today's Diagnoses     DDD (degenerative disc disease), lumbar    -  1      Care Instructions    Stop Smoking instruction  Instruction is to provided on the importance of smoking cessation and its impact on current and future health  -alternative available to help  -making commitment and decision to quit  -the nature of nicotine addiction is discussed  -behavioral therapy is discussed and suggested  -nicotine replacement therapy is discussed             Follow-ups after your visit        Additional Services     PAIN MANAGEMENT CENTER ([place]) REFERRAL       Your provider has referred you to the [facility].    Reason for Referral: Comprehensive Evaluation and Management    Please complete the following questions:    What is your diagnosis for the patient's pain? Early degenerative arthritis    Do you have any specific questions for the pain specialist? Yes: injections    Are there any red flags that may impact the assessment or management of the patient? None    **ANY DIAGNOSTIC TESTS THAT ARE NOT IN EPIC SHOULD BE SENT TO THE PAIN CENTER**    Please note the Pre-Op Pain Consult must be scheduled 2-3 weeks prior to the patient's surgery.  Patient's trying to schedule within 2 weeks of surgery may not be accommodated.     Pre-Op Pain Consults are only good for 30 days.    REGARDING OPIOID MEDICATIONS:  We will always address appropriateness of opioid pain medications, but we generally will not automatically take on a prescribing role. When we do take on prescribing of opioids for chronic pain, it is in collaboration with the referring physician for an intermediate period of time (months), with an  expectation that the primary physician or provider will assume the prescribing role if medications are effective at stable doses with demonstrated compliance.  Therefore, please do not assume that your prescribing responsibilities end on the day of pain clinic consultation.  Is this agreeable to you? NO: goes back to primary care    For any questions, contact the [facility] at [phone].    Please be aware that coverage of these services is subject to the terms and limitations of your health insurance plan.  Call member services at your health plan with any benefit or coverage questions.      Please bring the following with you to your appointment:    (1) Any X-Rays, CTs or MRIs which have been performed.  Contact the facility where they were done to arrange for  prior to your scheduled appointment.    (2) List of current medications   (3) This referral request   (4) Any documents/labs given to you for this referral                  Your next 10 appointments already scheduled     Feb 21, 2017  9:30 AM CST   [facility] Short with Kory Hudson MD   [facility]    [address] [place] [address] [phone]            Mar 30, 2017  7:30 AM CDT   (Arrive by 7:15 AM)   Return Visit with LAMBERT [facility] ([facility])    [address] [place] [address] [phone]              Who to contact     If you have questions or need follow up information about today's clinic visit or your schedule please contact [facility] directly at [phone].  Normal or non-critical lab and imaging results will be communicated to you by MyChart, letter or phone within 4 business days after the clinic has received the results. If you do not hear from us within 7 days, please contact the clinic through MyChart or  phone. If you have a critical or abnormal lab result, we will notify you by phone as soon as possible.  Submit refill requests through [employer] or call your pharmacy and they will forward the refill request to us. Please allow 3 business days for your refill to be completed.          Additional Information About Your Visit        [employer] Information     [employer] gives you secure access to your electronic health record. If you see a primary care provider, you can also send messages to your care team and make appointments. If you have questions, please call your primary care clinic.  If you do not have a primary care provider, please call [phone] and they will assist you.        Care EveryWhere ID     This is your Care EveryWhere ID. This could be used by other organizations to access your [facility] medical records  [id number]        Your Vitals Were     Pulse BMI (Body Mass Index)                75 45.03 kg/m2           Blood Pressure from Last 3 Encounters:   02/16/17 143/76   01/27/17 129/65   01/09/17 120/74    Weight from Last 3 Encounters:   02/16/17 (!) 150.6 kg (332 lb)   01/09/17 (!) 150.6 kg (332 lb 0.2 oz)   12/29/16 (!) 149.9 kg (330 lb 6.4 oz)              We Performed the Following     PAIN MANAGEMENT CENTER ([place]) REFERRAL        Primary Care Provider Office Phone # Fax #    Kory Hudson [phone]       [facility] [address] [address] [facility] [address]        Thank you!     Thank you for choosing [facility] [place]  for your care. Our goal is always to provide you with excellent care. Hearing back from our patients is one way we can continue to improve our services. Please take a few minutes to complete the written survey that you may receive in the mail after your visit with us. Thank you!             Your Updated Medication List - Protect others around you: Learn how to safely use, store and throw away your medicines at www.disposemymeds.org.           This list is accurate as of: 2/16/17  9:58 AM.  Always use your most recent med list.                   Brand Name Dispense Instructions for use    ARIPiprazole 5 MG tablet    ABILIFY     Take 5 mg by mouth every morning       aspirin  MG EC tablet     70 tablet    Take one Aspirin tab twice daily for 5 weeks.       cholecalciferol 5000 UNITS Caps      Take 5,000 Units by mouth daily       FLUoxetine 20 MG capsule    PROzac     Take 20 mg by mouth every morning       fluticasone 50 MCG/ACT spray    FLONASE     Spray 2 sprays into both nostrils every morning       HYDROcodone-acetaminophen 5-325 MG per tablet    NORCO    20 tablet    Take 1 tablet by mouth every 4 hours as needed for moderate to severe pain       ketoconazole 2 % shampoo    NIZORAL    120 mL    Apply to the scalp and beard two times per week and wash off after 5 minutes.       lisinopril 10 MG tablet    PRINIVIL/ZESTRIL     Take 10 mg by mouth every morning       mometasone-formoterol 100-5 MCG/ACT oral inhaler    DULERA    1 Inhaler    Inhale 2 puffs into the lungs 2 times daily       ondansetron 4 MG ODT tab    ZOFRAN ODT    15 tablet    Take 1 tablet (4 mg) by mouth every 8 hours as needed for nausea       * order for DME     1 each    auto CPAP 7-15 cm/h20       * order for DME     1 Units    Equipment being ordered: CPAP mask and tubing       * order for DME     1 Device    Equipment being ordered: walker       * order for DME     1 Device    Equipment being ordered: shower chair       * order for DME     2 Package    Equipment being ordered: disposable incontinent pads       * order for DME     1 Device    Equipment being ordered: raised toilet seat       * order for DME     1 Device    Equipment being ordered: Toilet Safety Frame       oxyCODONE-acetaminophen 5-325 MG per tablet    PERCOCET    70 tablet    Take 1-2 tablets by mouth every 4 hours as needed for moderate to severe pain       pantoprazole 40 MG EC tablet    PROTONIX     30 tablet    Take 1 tablet (40 mg) by mouth daily Take 30-60 minutes before first meal of the day       * Notice:  This list has 7 medication(s) that are the same as other medications prescribed for you. Read the directions carefully, and ask your doctor or other care provider to review them with you.

## 2017-03-13 ENCOUNTER — OFFICE VISIT (OUTPATIENT)
Dept: PALLIATIVE MEDICINE | Facility: CLINIC | Age: 37
End: 2017-03-13
Payer: COMMERCIAL

## 2017-03-13 VITALS — OXYGEN SATURATION: 97 % | DIASTOLIC BLOOD PRESSURE: 71 MMHG | SYSTOLIC BLOOD PRESSURE: 119 MMHG | HEART RATE: 74 BPM

## 2017-03-13 DIAGNOSIS — Z86.59 HISTORY OF DEPRESSION: ICD-10-CM

## 2017-03-13 DIAGNOSIS — M54.50 CHRONIC LUMBOSACRAL PAIN: ICD-10-CM

## 2017-03-13 DIAGNOSIS — G89.29 CHRONIC LUMBOSACRAL PAIN: ICD-10-CM

## 2017-03-13 DIAGNOSIS — R53.81 PHYSICAL DECONDITIONING: ICD-10-CM

## 2017-03-13 DIAGNOSIS — F12.90 MARIJUANA USE: ICD-10-CM

## 2017-03-13 DIAGNOSIS — G89.4 CHRONIC PAIN SYNDROME: Primary | ICD-10-CM

## 2017-03-13 DIAGNOSIS — Z98.890 STATUS POST HIP SURGERY: ICD-10-CM

## 2017-03-13 PROCEDURE — 99244 OFF/OP CNSLTJ NEW/EST MOD 40: CPT | Performed by: PAIN MEDICINE

## 2017-03-13 RX ORDER — NAPROXEN 500 MG/1
500 TABLET ORAL 2 TIMES DAILY WITH MEALS
Qty: 60 TABLET | Refills: 2 | Status: SHIPPED | OUTPATIENT
Start: 2017-03-13 | End: 2017-05-08

## 2017-03-13 ASSESSMENT — PAIN SCALES - GENERAL: PAINLEVEL: EXTREME PAIN (8)

## 2017-03-13 NOTE — MR AVS SNAPSHOT
After Visit Summary   3/13/2017    Kimo Greenwood    MRN: [id number]           Patient Information     Date Of Birth          1980        Visit Information        Provider Department      3/13/2017 10:30 AM Deepali Coleman MD [facility]        Today's Diagnoses     Chronic lumbosacral pain    -  1    Physical deconditioning          Care Instructions    PATIENT INSTRUCTIONS:      We discussed your CT lumbar spine findings. You were provided with a copy of your radiology report, for your records.       Will prescribe Naproxen, which is an anti-inflammatory medication. You can take 1 tab (500 mg) up to 2 times per day as needed. Always take with food/beverage. Risks/side effects: stomach irritation, effects on heart or kidneys.       Consider over-the-counter medication with Lidocaine in it (e.g. Salonpas patch, which has Lidocaine 4%). Apply this to the back. Follow directions on packaging.       Will order course of water therapy. Will provide prescription that you can take to the [facility]; we can also fax this prescription if needed. Will otherwise provide you with list of local pool locations. Check with your insurance about coverage.       Return to clinic after completion of therapy, approximately 8 weeks.     Thank you!    Scheduling number to the [facility]: [phone]. Call this number to schedule or change appointments.    Nurse Triage line: [phone]  Call this number with any questions or concerns. You can leave a message anytime. Please leave a detailed message. Calls are returned between 8 AM and 4 PM Monday through Thursday and from 8 AM to 12 Noon on Fridays. We usually get back to you within 24 to 48 hours depending on the issue/request.     We believe regular attendance is key to your success in our program.    Any time you are unable to keep your appointment we ask that you call us at least 24 hours in advance to let us know. This will  allow us to offer the appointment time to another patient.             Follow-ups after your visit        Additional Services     PHYSICAL THERAPY REFERRAL       AQUATIC THERAPY  Please evaluate treat  DX: Chronic lumbosacral pain, Physical deconditioning    Please be aware that coverage of these services is subject to the terms and limitations of your health insurance plan.  Call member services at your health plan with any benefit or coverage questions.      **Note to Provider:  If you are referring outside of [facility] for the therapy appointment, please list the name of the location in the  special instructions  above, print the referral and give to the patient to schedule the appointment.                  Your next 10 appointments already scheduled     Mar 20, 2017 10:30 AM CDT   Office Visit with Kory Hudson MD   [facility] ([facility]    [address] [place] [address] [phone]           Bring a current list of meds and any records pertaining to this visit.  For Physicals, please bring immunization records and any forms needing to be filled out.  Please arrive 10 minutes early to complete paperwork.            Mar 30, 2017  7:30 AM CDT   (Arrive by 7:15 AM)   Return Visit with [facility] ([facility])    [address]  [address] [facility] [address] [phone]              Who to contact     If you have questions or need follow up information about today's clinic visit or your schedule please contact [facility] directly at [phone].  Normal or non-critical lab and imaging results will be communicated to you by MyChart, letter or phone within 4 business days after the clinic has received the results. If you do not hear from us within 7 days, please contact the clinic through MyChart or phone. If you have a critical or abnormal lab  result, we will notify you by phone as soon as possible.  Submit refill requests through [employer] or call your pharmacy and they will forward the refill request to us. Please allow 3 business days for your refill to be completed.          Additional Information About Your Visit        [employer] Information     [employer] gives you secure access to your electronic health record. If you see a primary care provider, you can also send messages to your care team and make appointments. If you have questions, please call your primary care clinic.  If you do not have a primary care provider, please call [phone] and they will assist you.        Care EveryWhere ID     This is your Care EveryWhere ID. This could be used by other organizations to access your [facility] medical records  [id number]        Your Vitals Were     Pulse Pulse Oximetry                74 97%           Blood Pressure from Last 3 Encounters:   03/13/17 119/71   02/16/17 143/76   01/27/17 129/65    Weight from Last 3 Encounters:   02/16/17 (!) 150.6 kg (332 lb)   01/09/17 (!) 150.6 kg (332 lb 0.2 oz)   12/29/16 (!) 149.9 kg (330 lb 6.4 oz)              We Performed the Following     PHYSICAL THERAPY REFERRAL          Today's Medication Changes          These changes are accurate as of: 3/13/17 11:38 AM.  If you have any questions, ask your nurse or doctor.               Start taking these medicines.        Dose/Directions    naproxen 500 MG tablet   Commonly known as:  NAPROSYN   Used for:  Chronic lumbosacral pain   Started by:  Deepali Coleman MD        Dose:  500 mg   Take 1 tablet (500 mg) by mouth 2 times daily (with meals) Take as needed, no more than 1000 mg per day.   Quantity:  60 tablet   Refills:  2            Where to get your medicines      These medications were sent to [facility] [address] [place] [address] - [address] AT [place] [address] & [address]  [address], [facility] [address]     Phone:  [phone]      naproxen 500 MG tablet                Primary Care Provider Office Phone # Fax #    Kory Hudson [phone]       [facility] [address]  [facility] [address]        Thank you!     Thank you for choosing [facility]  for your care. Our goal is always to provide you with excellent care. Hearing back from our patients is one way we can continue to improve our services. Please take a few minutes to complete the written survey that you may receive in the mail after your visit with us. Thank you!             Your Updated Medication List - Protect others around you: Learn how to safely use, store and throw away your medicines at www.disposemymeds.org.          This list is accurate as of: 3/13/17 11:38 AM.  Always use your most recent med list.                   Brand Name Dispense Instructions for use    ARIPiprazole 5 MG tablet    ABILIFY     Take 5 mg by mouth every morning       aspirin  MG EC tablet     70 tablet    Take one Aspirin tab twice daily for 5 weeks.       cholecalciferol 5000 UNITS Caps      Take 5,000 Units by mouth daily       FLUoxetine 20 MG capsule    PROzac     Take 20 mg by mouth every morning       fluticasone 50 MCG/ACT spray    FLONASE     Spray 2 sprays into both nostrils every morning       ketoconazole 2 % shampoo    NIZORAL    120 mL    Apply to the scalp and beard two times per week and wash off after 5 minutes.       lisinopril 10 MG tablet    PRINIVIL/ZESTRIL    90 tablet    Take 1 tablet (10 mg) by mouth every morning       mometasone-formoterol 100-5 MCG/ACT oral inhaler    DULERA    1 Inhaler    Inhale 2 puffs into the lungs 2 times daily       naproxen 500 MG tablet    NAPROSYN    60 tablet    Take 1 tablet (500 mg) by mouth 2 times daily (with meals) Take as needed, no more than 1000 mg per day.       ondansetron 4 MG ODT tab    ZOFRAN ODT    15 tablet    Take 1 tablet (4 mg) by mouth every 8 hours as needed for nausea        * order for DME     1 each    auto CPAP 7-15 cm/h20       * order for DME     1 Units    Equipment being ordered: CPAP mask and tubing       * order for DME     1 Device    Equipment being ordered: walker       * order for DME     1 Device    Equipment being ordered: shower chair       * order for DME     2 Package    Equipment being ordered: disposable incontinent pads       * order for DME     1 Device    Equipment being ordered: raised toilet seat       * order for DME     1 Device    Equipment being ordered: Toilet Safety Frame       pantoprazole 40 MG EC tablet    PROTONIX    30 tablet    Take 1 tablet (40 mg) by mouth daily Take 30-60 minutes before first meal of the day       * Notice:  This list has 7 medication(s) that are the same as other medications prescribed for you. Read the directions carefully, and ask your doctor or other care provider to review them with you.

## 2017-03-13 NOTE — NURSING NOTE
Chief Complaint   Patient presents with     Consult       Initial /71 (BP Location: Right arm, Patient Position: Chair, Cuff Size: Adult Large)  Pulse 74  SpO2 97% Estimated body mass index is 45.03 kg/(m^2) as calculated from the following:    Height as of 1/25/17: 1.829 m (6').    Weight as of 2/16/17: 150.6 kg (332 lb).  Medication Reconciliation: valdez Gallo CMA   [facility]
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
## 2017-03-15 ENCOUNTER — TELEPHONE (OUTPATIENT)
Dept: SURGERY | Facility: CLINIC | Age: 37
End: 2017-03-15

## 2017-03-15 ENCOUNTER — TRANSFERRED RECORDS (OUTPATIENT)
Dept: HEALTH INFORMATION MANAGEMENT | Facility: CLINIC | Age: 37
End: 2017-03-15

## 2017-03-15 NOTE — TELEPHONE ENCOUNTER
Left message with contact information for pt to call and reschedule. Will await call back. Nya BUSTILLO LPN

## 2017-03-20 ENCOUNTER — OFFICE VISIT (OUTPATIENT)
Dept: FAMILY MEDICINE | Facility: CLINIC | Age: 37
End: 2017-03-20
Payer: COMMERCIAL

## 2017-03-20 VITALS
HEIGHT: 72 IN | TEMPERATURE: 98.3 F | BODY MASS INDEX: 42.66 KG/M2 | WEIGHT: 315 LBS | SYSTOLIC BLOOD PRESSURE: 120 MMHG | HEART RATE: 75 BPM | DIASTOLIC BLOOD PRESSURE: 76 MMHG | RESPIRATION RATE: 12 BRPM | OXYGEN SATURATION: 98 %

## 2017-03-20 DIAGNOSIS — M54.50 CHRONIC LOW BACK PAIN WITHOUT SCIATICA, UNSPECIFIED BACK PAIN LATERALITY: ICD-10-CM

## 2017-03-20 DIAGNOSIS — E55.9 VITAMIN D DEFICIENCY: ICD-10-CM

## 2017-03-20 DIAGNOSIS — E66.01 MORBID OBESITY DUE TO EXCESS CALORIES (H): ICD-10-CM

## 2017-03-20 DIAGNOSIS — D72.829 LEUKOCYTOSIS, UNSPECIFIED TYPE: ICD-10-CM

## 2017-03-20 DIAGNOSIS — K21.9 LPRD (LARYNGOPHARYNGEAL REFLUX DISEASE): ICD-10-CM

## 2017-03-20 DIAGNOSIS — F33.1 MAJOR DEPRESSIVE DISORDER, RECURRENT EPISODE, MODERATE (H): ICD-10-CM

## 2017-03-20 DIAGNOSIS — G47.33 OSA (OBSTRUCTIVE SLEEP APNEA): ICD-10-CM

## 2017-03-20 DIAGNOSIS — L21.9 SEBORRHEIC DERMATITIS: ICD-10-CM

## 2017-03-20 DIAGNOSIS — Z96.642 STATUS POST TOTAL REPLACEMENT OF LEFT HIP: ICD-10-CM

## 2017-03-20 DIAGNOSIS — J45.20 MILD INTERMITTENT ASTHMA WITHOUT COMPLICATION: Primary | ICD-10-CM

## 2017-03-20 DIAGNOSIS — G89.29 CHRONIC LOW BACK PAIN WITHOUT SCIATICA, UNSPECIFIED BACK PAIN LATERALITY: ICD-10-CM

## 2017-03-20 DIAGNOSIS — K29.80 DUODENITIS: ICD-10-CM

## 2017-03-20 DIAGNOSIS — I10 HYPERTENSION GOAL BP (BLOOD PRESSURE) < 140/90: ICD-10-CM

## 2017-03-20 LAB
ALBUMIN SERPL-MCNC: 3.8 G/DL (ref 3.4–5)
ALP SERPL-CCNC: 76 U/L (ref 40–150)
ALT SERPL W P-5'-P-CCNC: 25 U/L (ref 0–70)
ANION GAP SERPL CALCULATED.3IONS-SCNC: 9 MMOL/L (ref 3–14)
AST SERPL W P-5'-P-CCNC: 8 U/L (ref 0–45)
BASOPHILS # BLD AUTO: 0 10E9/L (ref 0–0.2)
BASOPHILS NFR BLD AUTO: 0.3 %
BILIRUB SERPL-MCNC: 0.3 MG/DL (ref 0.2–1.3)
BUN SERPL-MCNC: 16 MG/DL (ref 7–30)
CALCIUM SERPL-MCNC: 9.4 MG/DL (ref 8.5–10.1)
CHLORIDE SERPL-SCNC: 104 MMOL/L (ref 94–109)
CHOLEST SERPL-MCNC: 136 MG/DL
CO2 SERPL-SCNC: 27 MMOL/L (ref 20–32)
CREAT SERPL-MCNC: 0.97 MG/DL (ref 0.66–1.25)
DIFFERENTIAL METHOD BLD: ABNORMAL
EOSINOPHIL # BLD AUTO: 0.2 10E9/L (ref 0–0.7)
EOSINOPHIL NFR BLD AUTO: 2.3 %
ERYTHROCYTE [DISTWIDTH] IN BLOOD BY AUTOMATED COUNT: 12.3 % (ref 10–15)
GFR SERPL CREATININE-BSD FRML MDRD: 88 ML/MIN/1.7M2
GLUCOSE SERPL-MCNC: 83 MG/DL (ref 70–99)
HBA1C MFR BLD: 4.6 % (ref 4.3–6)
HCT VFR BLD AUTO: 38 % (ref 40–53)
HDLC SERPL-MCNC: 33 MG/DL
HGB BLD-MCNC: 12.7 G/DL (ref 13.3–17.7)
LDLC SERPL CALC-MCNC: 91 MG/DL
LYMPHOCYTES # BLD AUTO: 1.7 10E9/L (ref 0.8–5.3)
LYMPHOCYTES NFR BLD AUTO: 21.3 %
MCH RBC QN AUTO: 28.3 PG (ref 26.5–33)
MCHC RBC AUTO-ENTMCNC: 33.4 G/DL (ref 31.5–36.5)
MCV RBC AUTO: 85 FL (ref 78–100)
MONOCYTES # BLD AUTO: 0.7 10E9/L (ref 0–1.3)
MONOCYTES NFR BLD AUTO: 9.3 %
NEUTROPHILS # BLD AUTO: 5.2 10E9/L (ref 1.6–8.3)
NEUTROPHILS NFR BLD AUTO: 66.8 %
NONHDLC SERPL-MCNC: 103 MG/DL
PLATELET # BLD AUTO: 246 10E9/L (ref 150–450)
POTASSIUM SERPL-SCNC: 4 MMOL/L (ref 3.4–5.3)
PROT SERPL-MCNC: 7.7 G/DL (ref 6.8–8.8)
RBC # BLD AUTO: 4.49 10E12/L (ref 4.4–5.9)
SODIUM SERPL-SCNC: 140 MMOL/L (ref 133–144)
TRIGL SERPL-MCNC: 60 MG/DL
TSH SERPL DL<=0.005 MIU/L-ACNC: 1.9 MU/L (ref 0.4–4)
WBC # BLD AUTO: 7.7 10E9/L (ref 4–11)

## 2017-03-20 PROCEDURE — 83036 HEMOGLOBIN GLYCOSYLATED A1C: CPT | Performed by: FAMILY MEDICINE

## 2017-03-20 PROCEDURE — 36415 COLL VENOUS BLD VENIPUNCTURE: CPT | Performed by: FAMILY MEDICINE

## 2017-03-20 PROCEDURE — 99214 OFFICE O/P EST MOD 30 MIN: CPT | Performed by: FAMILY MEDICINE

## 2017-03-20 PROCEDURE — 82306 VITAMIN D 25 HYDROXY: CPT | Performed by: FAMILY MEDICINE

## 2017-03-20 PROCEDURE — 80061 LIPID PANEL: CPT | Performed by: FAMILY MEDICINE

## 2017-03-20 PROCEDURE — 80050 GENERAL HEALTH PANEL: CPT | Performed by: FAMILY MEDICINE

## 2017-03-20 RX ORDER — PANTOPRAZOLE SODIUM 40 MG/1
40 TABLET, DELAYED RELEASE ORAL DAILY
Qty: 90 TABLET | Refills: 3 | Status: SHIPPED | OUTPATIENT
Start: 2017-03-20 | End: 2017-12-11

## 2017-03-20 RX ORDER — ALBUTEROL SULFATE 90 UG/1
2 AEROSOL, METERED RESPIRATORY (INHALATION) EVERY 6 HOURS PRN
Qty: 1 INHALER | Refills: 3 | Status: SHIPPED | OUTPATIENT
Start: 2017-03-20 | End: 2017-08-04

## 2017-03-20 RX ORDER — KETOCONAZOLE 20 MG/ML
SHAMPOO TOPICAL
Qty: 120 ML | Refills: 11 | Status: SHIPPED | OUTPATIENT
Start: 2017-03-20 | End: 2017-12-11

## 2017-03-20 NOTE — PATIENT INSTRUCTIONS
Breathing/asthma - No smoking!  Continue dulera 2 times daily, try albuterol when coughing/sick    Back pain - follow-up with pain clinic, Naproxen 2 times daily, lidocaine topical    Continue protonix - stomach    Continue other medications    Labs done today

## 2017-03-20 NOTE — LETTER
My Asthma Action Plan  Name: Kimo Greenwood   YOB: 1980  Date: 3/20/2017   My doctor: Kory Hudson MD   My clinic: [facility]        My Control Medicine: Mometasone + formoterol (Dulera) -  100/5 mcg 2 times daily  My Rescue Medicine: Albuterol (Proair/Ventolin/Proventil) inhaler  - just as needed up to every 6 hours   My Asthma Severity: intermittent  Avoid your asthma triggers             GREEN ZONE     Good Control    I feel good    No cough or wheeze    Can work, sleep and play without asthma symptoms       Take your asthma control medicine every day.     1. If exercise triggers your asthma, take your rescue medication    15 minutes before exercise or sports, and    During exercise if you have asthma symptoms  2. Spacer to use with inhaler: If you have a spacer, make sure to use it with your inhaler             YELLOW ZONE     Getting Worse  I have ANY of these:    I do not feel good    Cough or wheeze    Chest feels tight    Wake up at night   1. Keep taking your Green Zone medications  2. Start taking your rescue medicine:    every 20 minutes for up to 1 hour. Then every 4 hours for 24-48 hours.  3. If you stay in the Yellow Zone for more than 12-24 hours, contact your doctor.  4. If you do not return to the Green Zone in 12-24 hours or you get worse, start taking your oral steroid medicine if prescribed by your provider.           RED ZONE     Medical Alert - Get Help  I have ANY of these:    I feel awful    Medicine is not helping    Breathing getting harder    Trouble walking or talking    Nose opens wide to breathe       1. Take your rescue medicine NOW  2. If your provider has prescribed an oral steroid medicine, start taking it NOW  3. Call your doctor NOW  4. If you are still in the Red Zone after 20 minutes and you have not reached your doctor:    Take your rescue medicine again and    Call 911 or go to the emergency room right away    See your regular doctor  within 2 weeks of an Emergency Room or Urgent Care visit for follow-up treatment.        Electronically signed by: Kory Hudson, March 20, 2017    Annual Reminders:  Meet with Asthma Educator,  Flu Shot in the Fall, consider Pneumonia Vaccination for patients with asthma (aged 19 and older).    Pharmacy:    [facility], [place] - [address] [facility] [address] [place] [address] [id number] AT [facility] [address]                    Asthma Triggers  How To Control Things That Make Your Asthma Worse    Triggers are things that make your asthma worse.  Look at the list below to help you find your triggers and what you can do about them.  You can help prevent asthma flare-ups by staying away from your triggers.      Trigger                                                          What you can do   Cigarette Smoke  Tobacco smoke can make asthma worse. Do not allow smoking in your home, car or around you.  Be sure no one smokes at a child s day care or school.  If you smoke, ask your health care provider for ways to help you quit.  Ask family members to quit too.  Ask your health care provider for a referral to Quit Plan to help you quit smoking, or call 9-815-855-PLAN.     Colds, Flu, Bronchitis  These are common triggers of asthma. Wash your hands often.  Don t touch your eyes, nose or mouth.  Get a flu shot every year.     Dust Mites  These are tiny bugs that live in cloth or carpet. They are too small to see. Wash sheets and blankets in hot water every week.   Encase pillows and mattress in dust mite proof covers.  Avoid having carpet if you can. If you have carpet, vacuum weekly.   Use a dust mask and HEPA vacuum.   Pollen and Outdoor Mold  Some people are allergic to trees, grass, or weed pollen, or molds. Try to keep your windows closed.  Limit time out doors when pollen count is high.   Ask you health care  provider about taking medicine during allergy season.     Animal Dander  Some people are allergic to skin flakes, urine or saliva from pets with fur or feathers. Keep pets with fur or feathers out of your home.    If you can t keep the pet outdoors, then keep the pet out of your bedroom.  Keep the bedroom door closed.  Keep pets off cloth furniture and away from stuffed toys.     Mice, Rats, and Cockroaches  Some people are allergic to the waste from these pests.   Cover food and garbage.  Clean up spills and food crumbs.  Store grease in the refrigerator.   Keep food out of the bedroom.   Indoor Mold  This can be a trigger if your home has high moisture. Fix leaking faucets, pipes, or other sources of water.   Clean moldy surfaces.  Dehumidify basement if it is damp and smelly.   Smoke, Strong Odors, and Sprays  These can reduce air quality. Stay away from strong odors and sprays, such as perfume, powder, hair spray, paints, smoke incense, paint, cleaning products, candles and new carpet.   Exercise or Sports  Some people with asthma have this trigger. Be active!  Ask your doctor about taking medicine before sports or exercise to prevent symptoms.    Warm up for 5-10 minutes before and after sports or exercise.     Other Triggers of Asthma  Cold air:  Cover your nose and mouth with a scarf.  Sometimes laughing or crying can be a trigger.  Some medicines and food can trigger asthma.

## 2017-03-20 NOTE — PROGRESS NOTES
SUBJECTIVE:                                                    Kimo Greenwood is a 36 year old male who presents to clinic today for the following health issues:    Patient presents with:  Recheck Medication    Patient with history of developmental delay.    History of depression.  Followed by [facility], Dr. Leonard.    History of obstructive sleep apnea, getting back on CPAP as he has been off recently.    Patient with morbid obesity.    Patient with cough and occasional shortness of breath that has been improved with inhaler, moderate obstructive airway defect on recent spirometry and appears to have asthma improving with inhaler.    Hypertension currently well controlled with lisinopril.  Denies shortness or breath, chest pain, headache, vision change, or lower extremity edema.    Ongoing abdominal pain issues with follow-up through GI.  Treated for IBS.    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    Patient Active Problem List   Diagnosis     Speech/language delay     Tobacco use disorder     Nocturnal enuresis     Moderate major depression (H)     LOREN (obstructive sleep apnea)     Insomnia     Hypertension goal BP (blood pressure) < 140/90     Morbid obesity (H)     Leukocytosis     Suicidal ideation     Chronic low back pain     Bilateral low back pain with left-sided sciatica     History of colonic polyps     Mild intellectual disability     S/P total hip arthroplasty     Vitamin D deficiency     Past Surgical History   Procedure Laterality Date     Colonoscopy       Orthopedic surgery       Colonoscopy N/A 10/30/2015     Procedure: COMBINED COLONOSCOPY, SINGLE OR MULTIPLE BIOPSY/POLYPECTOMY BY BIOPSY;  Surgeon: Alexis Jackson MD;  Location:  GI     Esophagoscopy, gastroscopy, duodenoscopy (egd), combined N/A 11/17/2016     Procedure: COMBINED ESOPHAGOSCOPY, GASTROSCOPY, DUODENOSCOPY (EGD), BIOPSY SINGLE OR MULTIPLE;  Surgeon: Cat Huber MD;  Location:  [facility]     Colonoscopy N/A 11/17/2016     Procedure: COMBINED COLONOSCOPY, SINGLE OR MULTIPLE BIOPSY/POLYPECTOMY BY BIOPSY;  Surgeon: Cat Huber MD;  Location: [facility] GI     Exam under anesthesia, fulgurate condyloma anus, combined N/A 12/22/2016     Procedure: COMBINED EXAM UNDER ANESTHESIA, FULGURATE CONDYLOMA ANUS;  Surgeon: Nya Cabello MD;  Location: [facility] OR     Genitourinary surgery       bladder, [facility], [facility],Interstem stimulator implant     Arthroplasty hip Left 1/25/2017     Procedure: ARTHROPLASTY HIP;  Surgeon: Bala Randall MD;  Location:  OR       Social History   Substance Use Topics     Smoking status: Heavy Tobacco Smoker     Packs/day: 0.50     Years: 1.00     Types: Cigarettes     Smokeless tobacco: Never Used      Comment: Smokes E Cigs equal to 1 PPD     Alcohol use Yes      Comment: socially     Family History   Problem Relation Age of Onset     Anxiety Disorder Mother      Depression Mother      Ulcerative Colitis Mother 18     Breast Cancer Maternal Grandmother      CEREBROVASCULAR DISEASE Maternal Grandmother      Breast Cancer Maternal Aunt      CANCER Maternal Grandfather      grt uncles colon cancer         ROS:  CV: NEGATIVE for chest pain, palpitations or peripheral edema  MUSCULOSKELETAL: Improvements in hip pain, ongoing lower back pain without radiculopathy  PSYCHIATRIC: stable, Denies suicidal ideation, denies delusion or hallucination    OBJECTIVE:                                                    /76 (BP Location: Right arm, Patient Position: Chair, Cuff Size: Adult Large)  Pulse 75  Temp 98.3  F (36.8  C) (Oral)  Resp 12  Ht 6' (1.829 m)  Wt (!) 331 lb 8 oz (150.4 kg)  SpO2 98%  BMI 44.96 kg/m2Body mass index is 44.96 kg/(m^2).  GENERAL APPEARANCE: alert, no distress and Morbidly obese  CV: regular rates and rhythm and no murmur, click or rub  PSYCH: affect flat, speech with short responses but normal content     ASSESSMENT/PLAN:                                                     1. Mild intermittent asthma without complication  Continue current inhalers below.  - albuterol (PROAIR HFA/PROVENTIL HFA/VENTOLIN HFA) 108 (90 BASE) MCG/ACT Inhaler; Inhale 2 puffs into the lungs every 6 hours as needed for shortness of breath / dyspnea or wheezing  Dispense: 1 Inhaler; Refill: 3  - mometasone-formoterol (DULERA) 100-5 MCG/ACT oral inhaler; Inhale 2 puffs into the lungs 2 times daily  Dispense: 1 Inhaler; Refill: 11    2. LOREN (obstructive sleep apnea)  Recommend consistent CPAP use.    3. Hypertension goal BP (blood pressure) < 140/90  Currently well controlled.    4. Morbid obesity due to excess calories (H)  Discussed ongoing efforts for increasing activity - though difficult at this time after surgery.  Discussed importance of healthy diet.  - TSH with free T4 reflex  - Comprehensive metabolic panel  - Lipid panel reflex to direct LDL  - Hemoglobin A1c    5. Leukocytosis, unspecified type  Continue to monitor.  - CBC with platelets differential    6. Major depressive disorder, recurrent episode, moderate (H)    7. Vitamin D deficiency  - Vitamin D Deficiency    8. Seborrheic dermatitis  - ketoconazole (NIZORAL) 2 % shampoo; Apply to the scalp and beard two times per week and wash off after 5 minutes.  Dispense: 120 mL; Refill: 11    9. Duodenitis  - pantoprazole (PROTONIX) 40 MG EC tablet; Take 1 tablet (40 mg) by mouth daily Take 30-60 minutes before first meal of the day  Dispense: 90 tablet; Refill: 3    10. LPRD (laryngopharyngeal reflux disease)  Continue PPI.  - pantoprazole (PROTONIX) 40 MG EC tablet; Take 1 tablet (40 mg) by mouth daily Take 30-60 minutes before first meal of the day  Dispense: 90 tablet; Refill: 3    11. Chronic low back pain without sciatica, unspecified back pain laterality  Follow-up with spine clinic.    12. Status post total replacement of left hip  Doing well, follow-up with orthopedics as planned.    Kory Hudson,  MD  [facility]

## 2017-03-20 NOTE — MR AVS SNAPSHOT
After Visit Summary   3/20/2017    Kimo Greenwood    MRN: [id number]           Patient Information     Date Of Birth          1980        Visit Information        Provider Department      3/20/2017 10:30 AM Kory Hudson MD [facility]        Today's Diagnoses     Mild intermittent asthma without complication    -  1    LOREN (obstructive sleep apnea)        Hypertension goal BP (blood pressure) < 140/90        Morbid obesity due to excess calories (H)        Leukocytosis, unspecified type        Major depressive disorder, recurrent episode, moderate (H)        Vitamin D deficiency          Care Instructions    Breathing/asthma - No smoking!  Continue dulera 2 times daily, try albuterol when coughing/sick    Back pain - follow-up with pain clinic, Naproxen 2 times daily, lidocaine topical    Continue protonix - stomach    Continue other medications    Labs done today        Follow-ups after your visit        Your next 10 appointments already scheduled     Mar 30, 2017  8:00 AM CDT   Pool Evaluation with Ina Simmons, PT   [facility] ([facility])    [address] [id number] [phone]            Apr 13, 2017  7:30 AM CDT   (Arrive by 7:15 AM)   Return Visit with [facility] ([facility])    [address] [place] [id number] [phone]            May 08, 2017  9:00 AM CDT   Return Visit with Deepali Coleman MD   [facility] ([facility])    [address] [address] [address]   [phone]              Who to contact     If you have questions or need follow up information about today's clinic visit or your schedule please contact [facility] directly at [phone].  Normal or non-critical lab and imaging results will be  communicated to you by [employer], letter or phone within 4 business days after the clinic has received the results. If you do not hear from us within 7 days, please contact the clinic through [employer] or phone. If you have a critical or abnormal lab result, we will notify you by phone as soon as possible.  Submit refill requests through [employer] or call your pharmacy and they will forward the refill request to us. Please allow 3 business days for your refill to be completed.          Additional Information About Your Visit        [employer] Information     [employer] gives you secure access to your electronic health record. If you see a primary care provider, you can also send messages to your care team and make appointments. If you have questions, please call your primary care clinic.  If you do not have a primary care provider, please call [phone] and they will assist you.        Care EveryWhere ID     This is your Care EveryWhere ID. This could be used by other organizations to access your [facility] medical records  [id number]        Your Vitals Were     Pulse Temperature Respirations Height Pulse Oximetry BMI (Body Mass Index)    75 98.3  F (36.8  C) (Oral) 12 6' (1.829 m) 98% 44.96 kg/m2       Blood Pressure from Last 3 Encounters:   03/20/17 120/76   03/13/17 119/71   02/16/17 143/76    Weight from Last 3 Encounters:   03/20/17 (!) 331 lb 8 oz (150.4 kg)   02/16/17 (!) 332 lb (150.6 kg)   01/09/17 (!) 332 lb 0.2 oz (150.6 kg)              We Performed the Following     CBC with platelets differential     Comprehensive metabolic panel     Hemoglobin A1c     Lipid panel reflex to direct LDL     TSH with free T4 reflex     Vitamin D Deficiency          Today's Medication Changes          These changes are accurate as of: 3/20/17 11:20 AM.  If you have any questions, ask your nurse or doctor.               Start taking these medicines.        Dose/Directions    albuterol 108 (90 BASE) MCG/ACT Inhaler   Commonly known  as:  PROAIR HFA/PROVENTIL HFA/VENTOLIN HFA   Used for:  Mild intermittent asthma without complication   Started by:  Kory Hudson MD        Dose:  2 puff   Inhale 2 puffs into the lungs every 6 hours as needed for shortness of breath / dyspnea or wheezing   Quantity:  1 Inhaler   Refills:  3         These medicines have changed or have updated prescriptions.        Dose/Directions    * order for DME   This may have changed:  Another medication with the same name was removed. Continue taking this medication, and follow the directions you see here.   Used for:  Obstructive sleep apnea (adult) (pediatric)   Changed by:  Michelle Coburn APRN CNP        auto CPAP 7-15 cm/h20   Quantity:  1 each   Refills:  0       * order for DME   This may have changed:  Another medication with the same name was removed. Continue taking this medication, and follow the directions you see here.   Used for:  LOREN (obstructive sleep apnea)   Changed by:  Kory Hudson MD        Equipment being ordered: CPAP mask and tubing   Quantity:  1 Units   Refills:  0       * Notice:  This list has 2 medication(s) that are the same as other medications prescribed for you. Read the directions carefully, and ask your doctor or other care provider to review them with you.         Where to get your medicines      These medications were sent to [address]  [address] [address]     Phone:  [phone]     albuterol 108 (90 BASE) MCG/ACT Inhaler                Primary Care Provider Office Phone # Fax #    Kory Hudson [phone]       [facility] [address]  [facility] [address]        Thank you!     Thank you for choosing [facility]  for your care. Our goal is always to provide you with excellent care. Hearing back from our patients is one way we can continue to improve our services. Please  take a few minutes to complete the written survey that you may receive in the mail after your visit with us. Thank you!             Your Updated Medication List - Protect others around you: Learn how to safely use, store and throw away your medicines at www.disposemymeds.org.          This list is accurate as of: 3/20/17 11:20 AM.  Always use your most recent med list.                   Brand Name Dispense Instructions for use    albuterol 108 (90 BASE) MCG/ACT Inhaler    PROAIR HFA/PROVENTIL HFA/VENTOLIN HFA    1 Inhaler    Inhale 2 puffs into the lungs every 6 hours as needed for shortness of breath / dyspnea or wheezing       ARIPiprazole 5 MG tablet    ABILIFY     Take 5 mg by mouth every morning       aspirin  MG EC tablet     70 tablet    Take one Aspirin tab twice daily for 5 weeks.       cholecalciferol 5000 UNITS Caps      Take 5,000 Units by mouth daily       FLUoxetine 20 MG capsule    PROzac     Take 20 mg by mouth every morning       fluticasone 50 MCG/ACT spray    FLONASE     Spray 2 sprays into both nostrils every morning       ketoconazole 2 % shampoo    NIZORAL    120 mL    Apply to the scalp and beard two times per week and wash off after 5 minutes.       lisinopril 10 MG tablet    PRINIVIL/ZESTRIL    90 tablet    Take 1 tablet (10 mg) by mouth every morning       mometasone-formoterol 100-5 MCG/ACT oral inhaler    DULERA    1 Inhaler    Inhale 2 puffs into the lungs 2 times daily       naproxen 500 MG tablet    NAPROSYN    60 tablet    Take 1 tablet (500 mg) by mouth 2 times daily (with meals) Take as needed, no more than 1000 mg per day.       ondansetron 4 MG ODT tab    ZOFRAN ODT    15 tablet    Take 1 tablet (4 mg) by mouth every 8 hours as needed for nausea       * order for DME     1 each    auto CPAP 7-15 cm/h20       * order for DME     1 Units    Equipment being ordered: CPAP mask and tubing       pantoprazole 40 MG EC tablet    PROTONIX    30 tablet    Take 1 tablet (40 mg) by  mouth daily Take 30-60 minutes before first meal of the day       * Notice:  This list has 2 medication(s) that are the same as other medications prescribed for you. Read the directions carefully, and ask your doctor or other care provider to review them with you.

## 2017-03-21 LAB — DEPRECATED CALCIDIOL+CALCIFEROL SERPL-MC: 40 UG/L (ref 20–75)

## 2017-03-27 PROBLEM — K22.70 BARRETT'S ESOPHAGUS DETERMINED BY BIOPSY: Status: ACTIVE | Noted: 2017-03-27

## 2017-03-27 ASSESSMENT — ANXIETY QUESTIONNAIRES
2. NOT BEING ABLE TO STOP OR CONTROL WORRYING: NOT AT ALL
3. WORRYING TOO MUCH ABOUT DIFFERENT THINGS: NOT AT ALL
GAD7 TOTAL SCORE: 0
6. BECOMING EASILY ANNOYED OR IRRITABLE: NOT AT ALL
7. FEELING AFRAID AS IF SOMETHING AWFUL MIGHT HAPPEN: NOT AT ALL
1. FEELING NERVOUS, ANXIOUS, OR ON EDGE: NOT AT ALL
5. BEING SO RESTLESS THAT IT IS HARD TO SIT STILL: NOT AT ALL

## 2017-03-27 ASSESSMENT — PATIENT HEALTH QUESTIONNAIRE - PHQ9: 5. POOR APPETITE OR OVEREATING: NOT AT ALL

## 2017-03-28 ASSESSMENT — ANXIETY QUESTIONNAIRES: GAD7 TOTAL SCORE: 0

## 2017-03-28 ASSESSMENT — ASTHMA QUESTIONNAIRES: ACT_TOTALSCORE: 24

## 2017-03-28 ASSESSMENT — PATIENT HEALTH QUESTIONNAIRE - PHQ9: SUM OF ALL RESPONSES TO PHQ QUESTIONS 1-9: 1

## 2017-03-30 ENCOUNTER — HOSPITAL ENCOUNTER (OUTPATIENT)
Dept: PHYSICAL THERAPY | Facility: CLINIC | Age: 37
Setting detail: THERAPIES SERIES
End: 2017-03-30
Attending: PAIN MEDICINE
Payer: COMMERCIAL

## 2017-03-30 PROCEDURE — 97162 PT EVAL MOD COMPLEX 30 MIN: CPT | Mod: GP | Performed by: PHYSICAL THERAPIST

## 2017-03-30 PROCEDURE — 40000185 ZZHC STATISTIC PT OUTPT VISIT: Performed by: PHYSICAL THERAPIST

## 2017-03-30 PROCEDURE — 97110 THERAPEUTIC EXERCISES: CPT | Mod: GP | Performed by: PHYSICAL THERAPIST
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
## 2017-03-31 ENCOUNTER — HOSPITAL ENCOUNTER (OUTPATIENT)
Dept: PHYSICAL THERAPY | Facility: CLINIC | Age: 37
Setting detail: THERAPIES SERIES
End: 2017-03-31
Attending: PAIN MEDICINE
Payer: COMMERCIAL

## 2017-03-31 PROCEDURE — 97113 AQUATIC THERAPY/EXERCISES: CPT | Mod: GP | Performed by: PHYSICAL THERAPIST

## 2017-03-31 PROCEDURE — 40000189 ZZH STATISTIC PT POOL VISIT: Performed by: PHYSICAL THERAPIST

## 2017-04-03 ENCOUNTER — HOSPITAL ENCOUNTER (OUTPATIENT)
Dept: PHYSICAL THERAPY | Facility: CLINIC | Age: 37
Setting detail: THERAPIES SERIES
End: 2017-04-03
Attending: PAIN MEDICINE
Payer: COMMERCIAL

## 2017-04-03 PROCEDURE — 97113 AQUATIC THERAPY/EXERCISES: CPT | Mod: GP | Performed by: PHYSICAL THERAPIST

## 2017-04-03 PROCEDURE — 40000189 ZZH STATISTIC PT POOL VISIT: Performed by: PHYSICAL THERAPIST

## 2017-04-03 NOTE — PROGRESS NOTES
AQUATIC PHYSICAL THERAPY EXERCISE LOG (TRUNK)  Date  3/31/17  Ina Simmons PT, DPT   Warm Up Ambulation (Forward/Side/Back/March/All) x2 ea             Stretching/ROM Chin Tucks     CROM (Flex/Ext/SB/Rot/All)     Shoulder (Shrugs/Rolls)     Scapular (Retraction/Depression)     Upper Trunk (Levator/Scalene/Upper Trapezius)     Pec Stretch (Unilateral/Bilateral)     Posterior Shoulder     Gluteal 2x30 sec ea    Hamstring (On Step/Cuff) 2x30 sec ea, assist for support from noodle under ankle    Calf (In Hole/At Wall) 2x30 sec ea    Quad     Hip Flexor (Kneel)     Piriformis (Seated/Stand)     Trunk ROM (Flex/Ext/SB/Rot/All) Flex/SB R and L x30 sec ea    BAD RAGAZ              Strengthening Abdominal Sets/ Pelvic Tilt Cues with exercises    Shoulder (Flex/Ext, Abd/Add, IR/ER, Circles, Alternation flex/ext for core)     Horizon (Abd/Add, Diagonals)     Rowing Arms     UE PNF (D1/D2)     Abdominal Sets (Push/Pull, side to side, push down with board)     Squat Glut taps to wall x10    Lunge Squat     Hip (Flex/Ext, Abd/Add, single leg bike, circles, figure 8, All) x10 ea with UE support from wall, therapist facil at trunk and cues for visual fixation for posture    Heel/Toe Raises     Step Ups (Forward, Lateral) x10 ea forward/lateral    Noodle Push Downs with UE(B UE/1 UE) for core strengthening     Noodle Push Downs with LE x10 ea with white/colored noodle    Stir the Pot/Punches with Dumbells     Sit to Stand at Bench     Prone Plank on step     Side Plank on step              Aerobic Fast Walking     Bike/Ski/Cesar/All x5 min biking in corner             Balance Narrow Base of Support     Tandem Stand     Single Leg Stance     Heel/Toe Walking     3 Step and Stop x5 A/P stepping    Braiding                   Cool Down Ambulation     [place] Dangle     Whirlpool

## 2017-04-03 NOTE — PROGRESS NOTES
AQUATIC PHYSICAL THERAPY EXERCISE LOG (TRUNK)  Date   3/31/17  Ina Simmons PT, DPT Nya Green, DPT  4/3/17   Warm Up Ambulation (Forward/Side/Back/March/All) x2 ea X 2 each                     Stretching/ROM Chin Tucks        CROM (Flex/Ext/SB/Rot/All)        Shoulder (Shrugs/Rolls)        Scapular (Retraction/Depression)        Upper Trunk (Levator/Scalene/Upper Trapezius)        Pec Stretch (Unilateral/Bilateral)        Posterior Shoulder        Gluteal 2x30 sec ea 2 x 30 sec eac     Hamstring (On Step/Cuff) 2x30 sec ea, assist for support from noodle under ankle 2 x 30 sec each     Calf (In Hole/At Wall) 2x30 sec ea 2 x 30 sec each     Quad        Hip Flexor (Kneel)        Piriformis (Seated/Stand)        Trunk ROM (Flex/Ext/SB/Rot/All) Flex/SB R and L x30 sec ea      BAD RAGAZ                        Strengthening Abdominal Sets/ Pelvic Tilt Cues with exercises Cues performance during exercises     Shoulder (Flex/Ext, Abd/Add, IR/ER, Circles, Alternation flex/ext for core)   With feet together for balance x 10     Horizon (Abd/Add, Diagonals)   As above     Rowing Arms        UE PNF (D1/D2)        Abdominal Sets (Push/Pull, side to side, push down with board)        Squat Glut taps to wall x10 X 10 with wall support, mild LBP following     Lunge Squat        Hip (Flex/Ext, Abd/Add, single leg bike, circles, figure 8, All) x10 ea with UE support from wall, therapist facil at trunk and cues for visual fixation for posture X 10 with wall support, cues for posture     Heel/Toe Raises        Step Ups (Forward, Lateral) x10 ea forward/lateral X 10 with med step for/lat     Noodle Push Downs with UE(B UE/1 UE) for core strengthening        Noodle Push Downs with LE x10 ea with white/colored noodle X 10 with white noodle     Stir the Pot/Punches with Dumbells        Sit to Stand at Bench        Prone Plank on step        Side Plank on step                        Aerobic Fast Walking        Bike/Ski/Cesar/All x5 min  biking in corner                      Balance Narrow Base of Support   X 30 sec     Tandem Stand   X 15 sec     Single Leg Stance   X 10 sec     Heel/Toe Walking   X 2 laps     3 Step and Stop x5 A/P stepping X 2 laps forward     Braiding                                 Cool Down Ambulation        [place] Dangle        Whirlpool

## 2017-04-10 ENCOUNTER — HOSPITAL ENCOUNTER (OUTPATIENT)
Dept: PHYSICAL THERAPY | Facility: CLINIC | Age: 37
Setting detail: THERAPIES SERIES
End: 2017-04-10
Attending: PAIN MEDICINE
Payer: COMMERCIAL

## 2017-04-10 PROCEDURE — 40000189 ZZH STATISTIC PT POOL VISIT: Performed by: PHYSICAL THERAPIST

## 2017-04-10 PROCEDURE — 97113 AQUATIC THERAPY/EXERCISES: CPT | Mod: GP | Performed by: PHYSICAL THERAPIST

## 2017-04-10 NOTE — PROGRESS NOTES
AQUATIC PHYSICAL THERAPY EXERCISE LOG (TRUNK)  Date    3/31/17  Ina Simmons PT, DPT Nya Green, DPT  4/3/17 Nya Green, DPT   Warm Up Ambulation (Forward/Side/Back/March/All) x2 ea X 2 each X 2 Each                              Stretching/ROM Chin Tucks            CROM (Flex/Ext/SB/Rot/All)            Shoulder (Shrugs/Rolls)            Scapular (Retraction/Depression)            Upper Trunk (Levator/Scalene/Upper Trapezius)            Pec Stretch (Unilateral/Bilateral)            Posterior Shoulder            Gluteal 2x30 sec ea 2 x 30 sec eac 2 x 30 sec each      Hamstring (On Step/Cuff) 2x30 sec ea, assist for support from noodle under ankle 2 x 30 sec each 2 x 30 sec each      Calf (In Hole/At Wall) 2x30 sec ea 2 x 30 sec each 2 x 30 sec each      Quad            Hip Flexor (Kneel)            Piriformis (Seated/Stand)            Trunk ROM (Flex/Ext/SB/Rot/All) Flex/SB R and L x30 sec ea   flexion stretch hands on wall 30 sec x 2      BAD RAGAZ                                     Strengthening Abdominal Sets/ Pelvic Tilt Cues with exercises Cues performance during exercises Cues during exercises     Shoulder (Flex/Ext, Abd/Add, IR/ER, Circles, Alternation flex/ext for core)    With feet together for balance x 10 X 10 in wall sit with hydrotones for abdominal work      Horizon (Abd/Add, Diagonals)    As above As above     Rowing Arms            UE PNF (D1/D2)            Abdominal Sets (Push/Pull, side to side, push down with board)      With small KB      Squat Glut taps to wall x10 X 10 with wall support, mild LBP following X 10 with wall support     Lunge Squat            Hip (Flex/Ext, Abd/Add, single leg bike, circles, figure 8, All) x10 ea with UE support from wall, therapist facil at trunk and cues for visual fixation for posture X 10 with wall support, cues for posture X 10 with wall support, cues for posture      Heel/Toe Raises            Step Ups (Forward, Lateral) x10 ea forward/lateral X 10 with med  step for/lat X 10 forward, lateral x 10 single leg squats, manual cues for positioning      Noodle Push Downs with UE(B UE/1 UE) for core strengthening      X 10 with white noodle      Noodle Push Downs with LE x10 ea with white/colored noodle X 10 with white noodle X 10 with white noodle     Stir the Pot/Punches with Dumbells            Sit to Stand at Bench            Prone Plank on step            Side Plank on step                                     Aerobic Fast Walking            Bike/Ski/Cesar/All x5 min biking in corner   X 5 min in corner                               Balance Narrow Base of Support    X 30 sec       Tandem Stand    X 15 sec       Single Leg Stance    X 10 sec       Heel/Toe Walking    X 2 laps       3 Step and Stop x5 A/P stepping X 2 laps forward      Braiding                                                   Cool Down Ambulation            [place] Dangle            [place]irlpool

## 2017-04-13 ENCOUNTER — OFFICE VISIT (OUTPATIENT)
Dept: SURGERY | Facility: CLINIC | Age: 37
End: 2017-04-13

## 2017-04-13 ENCOUNTER — HOSPITAL ENCOUNTER (OUTPATIENT)
Dept: PHYSICAL THERAPY | Facility: CLINIC | Age: 37
Setting detail: THERAPIES SERIES
End: 2017-04-13
Attending: PAIN MEDICINE
Payer: COMMERCIAL

## 2017-04-13 VITALS
HEIGHT: 71 IN | TEMPERATURE: 97.9 F | SYSTOLIC BLOOD PRESSURE: 125 MMHG | BODY MASS INDEX: 44.1 KG/M2 | WEIGHT: 315 LBS | DIASTOLIC BLOOD PRESSURE: 82 MMHG | HEART RATE: 74 BPM | OXYGEN SATURATION: 97 %

## 2017-04-13 DIAGNOSIS — A63.0 ANAL CONDYLOMATA: Primary | ICD-10-CM

## 2017-04-13 DIAGNOSIS — R15.9 FECAL INCONTINENCE: ICD-10-CM

## 2017-04-13 PROCEDURE — 40000189 ZZH STATISTIC PT POOL VISIT: Performed by: PHYSICAL THERAPIST

## 2017-04-13 PROCEDURE — 97113 AQUATIC THERAPY/EXERCISES: CPT | Mod: GP | Performed by: PHYSICAL THERAPIST

## 2017-04-13 ASSESSMENT — PAIN SCALES - GENERAL: PAINLEVEL: NO PAIN (0)

## 2017-04-13 NOTE — MR AVS SNAPSHOT
After Visit Summary   4/13/2017    Kimo Greenwood    MRN: [id number]           Patient Information     Date Of Birth          1980        Visit Information        Provider Department      4/13/2017 7:30 AM Deana Adrian, [facility]        Today's Diagnoses     Anal condylomata    -  1    Fecal incontinence           Follow-ups after your visit        Your next 10 appointments already scheduled     Apr 13, 2017 11:00 AM CDT   Pool Treatment with Nya Green, PT   [facility] ([facility])    [address] [address]   [phone]            Apr 17, 2017  8:45 AM CDT   Pool Treatment with Nya Green, PT   [facility] ([facility])    [address] [address]   [phone]            Apr 20, 2017  9:30 AM CDT   Pool Treatment with Nya Green, PT   [facility] ([facility])    [address] [address]   [phone]            Apr 24, 2017  9:30 AM CDT   Pool Treatment with Nya Green, PT   [facility] ([facility])    [address] [address]   [phone]            Apr 27, 2017  8:00 AM CDT   Pool Treatment with Nya Green, PT   [facility] ([facility])    [address] [address]   [phone]            May 08, 2017  9:00 AM CDT   Return Visit with Deepali Coleman MD   [facility] ([facility])    [address]  [address]  [facility] [address]   [phone]              Who to contact     Please call your clinic at [phone] to:    Ask questions about your health    Make or cancel appointments    Discuss your medicines    Learn about your test results    Speak to your doctor   If you have  "compliments or concerns about an experience at your clinic, or if you wish to file a complaint, please contact [facility] Patient Relations at [phone] or email us at [email or web address]         Additional Information About Your Visit        MyChart Information     [employer] gives you secure access to your electronic health record. If you see a primary care provider, you can also send messages to your care team and make appointments. If you have questions, please call your primary care clinic.  If you do not have a primary care provider, please call [phone] and they will assist you.      [employer] is an electronic gateway that provides easy, online access to your medical records. With [employer], you can request a clinic appointment, read your test results, renew a prescription or communicate with your care team.     To access your existing account, please contact your [facility] or call [phone] for assistance.        Care EveryWhere ID     This is your Care EveryWhere ID. This could be used by other organizations to access your [place] medical records  [id number]        Your Vitals Were     Pulse Temperature Height Pulse Oximetry BMI (Body Mass Index)       74 97.9  F (36.6  C) (Oral) 5' 11\" 97% 46.5 kg/m2        Blood Pressure from Last 3 Encounters:   04/13/17 125/82   03/20/17 120/76   03/13/17 119/71    Weight from Last 3 Encounters:   04/13/17 (!) 333 lb 6.4 oz   03/20/17 (!) 331 lb 8 oz   02/16/17 (!) 332 lb              We Performed the Following     ANOSCOPY W/WO BRUSH/WASH        Primary Care Provider Office Phone # Fax #    Kory Hudson [phone]       [facility] [address]  [facility] [address]        Thank you!     Thank you for choosing [facility]  for your care. Our goal is always to provide you with excellent care. Hearing back from our patients is one way " we can continue to improve our services. Please take a few minutes to complete the written survey that you may receive in the mail after your visit with us. Thank you!             Your Updated Medication List - Protect others around you: Learn how to safely use, store and throw away your medicines at www.disposemymeds.org.          This list is accurate as of: 4/13/17  8:09 AM.  Always use your most recent med list.                   Brand Name Dispense Instructions for use    albuterol 108 (90 BASE) MCG/ACT Inhaler    PROAIR HFA/PROVENTIL HFA/VENTOLIN HFA    1 Inhaler    Inhale 2 puffs into the lungs every 6 hours as needed for shortness of breath / dyspnea or wheezing       ARIPiprazole 5 MG tablet    ABILIFY     Take 5 mg by mouth every morning       aspirin  MG EC tablet     70 tablet    Take one Aspirin tab twice daily for 5 weeks.       cholecalciferol 5000 UNITS Caps      Take 5,000 Units by mouth daily       FLUoxetine 20 MG capsule    PROzac     Take 20 mg by mouth every morning       fluticasone 50 MCG/ACT spray    FLONASE     Spray 2 sprays into both nostrils every morning       ketoconazole 2 % shampoo    NIZORAL    120 mL    Apply to the scalp and beard two times per week and wash off after 5 minutes.       lisinopril 10 MG tablet    PRINIVIL/ZESTRIL    90 tablet    Take 1 tablet (10 mg) by mouth every morning       mometasone-formoterol 100-5 MCG/ACT oral inhaler    DULERA    1 Inhaler    Inhale 2 puffs into the lungs 2 times daily       naproxen 500 MG tablet    NAPROSYN    60 tablet    Take 1 tablet (500 mg) by mouth 2 times daily (with meals) Take as needed, no more than 1000 mg per day.       ondansetron 4 MG ODT tab    ZOFRAN ODT    15 tablet    Take 1 tablet (4 mg) by mouth every 8 hours as needed for nausea       * order for DME     1 each    auto CPAP 7-15 cm/h20       * order for DME     1 Units    Equipment being ordered: CPAP mask and tubing       pantoprazole 40 MG EC tablet     PROTONIX    90 tablet    Take 1 tablet (40 mg) by mouth daily Take 30-60 minutes before first meal of the day       psyllium 0.52 G capsule     180 capsule    Take 1 capsule (0.52 g) by mouth daily       * Notice:  This list has 2 medication(s) that are the same as other medications prescribed for you. Read the directions carefully, and ask your doctor or other care provider to review them with you.

## 2017-04-13 NOTE — NURSING NOTE
"No chief complaint on file.      Vitals:    04/13/17 0733   BP: 125/82   BP Location: Left arm   Patient Position: Chair   Cuff Size: Adult Large   Pulse: 74   Temp: 97.9  F (36.6  C)   TempSrc: Oral   SpO2: 97%   Weight: (!) 151.2 kg (333 lb 6.4 oz)   Height: 1.803 m (5' 11\")       Body mass index is 46.5 kg/(m^2).    José LEBRON LPN                          "

## 2017-04-13 NOTE — PROGRESS NOTES
AQUATIC PHYSICAL THERAPY EXERCISE LOG (TRUNK)  Date    3/31/17  Ina Simmons PT, DPT Nya Green, DPT  4/3/17 Nya Marquezsabiha, DPT Nya Marquezsabiha, DPT 4/13/17   Warm Up Ambulation (Forward/Side/Back/March/All) x2 ea X 2 each X 2 Each X 2 Each                                    Stretching/ROM Chin Tucks               CROM (Flex/Ext/SB/Rot/All)               Shoulder (Shrugs/Rolls)               Scapular (Retraction/Depression)               Upper Trunk (Levator/Scalene/Upper Trapezius)               Pec Stretch (Unilateral/Bilateral)               Posterior Shoulder               Gluteal 2x30 sec ea 2 x 30 sec eac 2 x 30 sec each 2 x 30 sec      Hamstring (On Step/Cuff) 2x30 sec ea, assist for support from noodle under ankle 2 x 30 sec each 2 x 30 sec each 2 x 30 sec      Calf (In Hole/At Wall) 2x30 sec ea 2 x 30 sec each 2 x 30 sec each 2 x 30 sec      Quad               Hip Flexor (Kneel)               Piriformis (Seated/Stand)               Trunk ROM (Flex/Ext/SB/Rot/All) Flex/SB R and L x30 sec ea    flexion stretch hands on wall 30 sec x 2       BAD RAGAZ                                              Strengthening Abdominal Sets/ Pelvic Tilt Cues with exercises Cues performance during exercises Cues during exercises Cues for performance during exercises     Shoulder (Flex/Ext, Abd/Add, IR/ER, Circles, Alternation flex/ext for core)    With feet together for balance x 10 X 10 in wall sit with hydrotones for abdominal work With closed paddles feet together x 10 each      Horizon (Abd/Add, Diagonals)    As above As above As above      Rowing Arms               UE PNF (D1/D2)               Abdominal Sets (Push/Pull, side to side, push down with board)       With small KB X 10 with medium KB feet together      Squat Glut taps to wall x10 X 10 with wall support, mild LBP following X 10 with wall support X 10 glute taps with noodle     Lunge Squat               Hip (Flex/Ext, Abd/Add, single leg bike, circles, figure 8, All)  x10 ea with UE support from wall, therapist facil at trunk and cues for visual fixation for posture X 10 with wall support, cues for posture X 10 with wall support, cues for posture X 10 with DBs for balance/strength progression      Heel/Toe Raises               Step Ups (Forward, Lateral) x10 ea forward/lateral X 10 with med step for/lat X 10 forward, lateral x 10 single leg squats, manual cues for positioning X 10 without wall support forward and lateral      Noodle Push Downs with UE(B UE/1 UE) for core strengthening       X 10 with white noodle X 10 with white noodle      Noodle Push Downs with LE x10 ea with white/colored noodle X 10 with white noodle X 10 with white noodle X 10 with white noodle      Stir the Pot/Punches with Dumbells         X 10 with white DBs      Sit to Stand at Bench               Prone Plank on step               Side Plank on step                                              Aerobic Fast Walking               Bike/Ski/Cesar/All x5 min biking in corner    X 5 min in corner                                      Balance Narrow Base of Support    X 30 sec         Tandem Stand    X 15 sec         Single Leg Stance    X 10 sec         Heel/Toe Walking    X 2 laps         3 Step and Stop x5 A/P stepping X 2 laps forward         Braiding                                                               Cool Down Ambulation               Deep Water Dangle               Whirlpool

## 2017-04-13 NOTE — PROGRESS NOTES
Colon and Rectal Surgery Postoperative Clinic Note    RE: Kimo Greenwood  : 1980  SALEEM: 18    Kimo Greenwood is a very pleasant 36 year old male with anal condyloma now status post exam of the anus under anesthesia, fulguration of anal condyloma, and anal biopsy on 2016 with Dr. Cabello. 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  He presents today with his mother for follow up.    Interval history: Willard has been doing well. He had a hip replacement in January and is having a lot less pain now. He has not noticed any new lesions. He continues to smoke but has switched to mostly e-cigarettes. He has had some episodes of leakage of stool, sometimes a whole bowel movement. He has started pelvic floor therapy for this and recently started on a fiber supplement but is taking 1-2 tablets a day. This has bulked his stools up some already.    Assessment/Plan:  36 year old male with history of anal condyloma. No new warts internally or externally on exam today. I again strongly encouraged smoking cessation including e-cigarettes. Would like to see him for a wart check again in 6 months.  Advised using a powder fiber supplement daily for leakage of stool. Start with once a day and can slowly increase this up to 3 times a day as needed. If continued incontinence on three times daily fiber supplement, return to clinic and we can discuss further interventions. Continue with pelvic floor physical therapy as well. Can use a zinc barrier cream to protect perianal skin and recommended keeping gauze tucked between buttocks to wick away moisture.  Encouraged the patient to contact the clinic in the meantime with any questions or concerns.    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    With associated speech/language delay     Obesity  11/11/2010     LOREN (obstructive sleep apnea)      Other chronic pain     left hip/leg pain     Seborrheic dermatitis      Uncomplicated asthma        Surgical history:  Past Surgical History:   Procedure Laterality Date     ARTHROPLASTY HIP Left 1/25/2017    Procedure: ARTHROPLASTY HIP;  Surgeon: Bala Randall MD;  Location: [facility] OR     COLONOSCOPY       COLONOSCOPY N/A 10/30/2015    Procedure: COMBINED COLONOSCOPY, SINGLE OR MULTIPLE BIOPSY/POLYPECTOMY BY BIOPSY;  Surgeon: Alexis Jackson MD;  Location: [facility] GI     COLONOSCOPY N/A 11/17/2016    Procedure: COMBINED COLONOSCOPY, SINGLE OR MULTIPLE BIOPSY/POLYPECTOMY BY BIOPSY;  Surgeon: Cat Huber MD;  Location: [facility]     ESOPHAGOSCOPY, GASTROSCOPY, DUODENOSCOPY (EGD), COMBINED N/A 11/17/2016    Procedure: COMBINED ESOPHAGOSCOPY, GASTROSCOPY, DUODENOSCOPY (EGD), BIOPSY SINGLE OR MULTIPLE;  Surgeon: Cat Huber MD;  Location: [facility]     EXAM UNDER ANESTHESIA, FULGURATE CONDYLOMA ANUS, COMBINED N/A 12/22/2016    Procedure: COMBINED EXAM UNDER ANESTHESIA, FULGURATE CONDYLOMA ANUS;  Surgeon: Nya Cabello MD;  Location: [facility] OR     GENITOURINARY SURGERY      bladder, I[facility], [facility],Interstem stimulator implant     ORTHOPEDIC SURGERY         Problem list:    Patient Active Problem List    Diagnosis Date Noted     Patel's esophagus determined by biopsy 03/27/2017     Priority: Medium     Next upper GI endoscopy (EGD) 11/2019       Vitamin D deficiency 03/20/2017     Priority: Medium     LPRD (laryngopharyngeal reflux disease) 03/20/2017     Priority: Medium     S/P total hip arthroplasty 01/25/2017     Priority: Medium     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     Priority: Medium     History of colonic polyps 09/28/2016     Priority: Medium     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 None 11/2016. Next colonoscopy 11/2021 if not sooner.       Bilateral low back pain with left-sided sciatica 10/08/2015      Priority: Medium     Chronic low back pain 10/06/2015     Priority: Medium     Suicidal ideation 08/23/2015     Priority: Medium     Leukocytosis 06/09/2014     Priority: Medium     Morbid obesity (H) 05/20/2014     Priority: Medium     Hypertension goal BP (blood pressure) < 140/90 02/03/2014     Priority: Medium     Insomnia 05/14/2013     Priority: Medium     LOREN (obstructive sleep apnea) 04/23/2013     Priority: Medium     CPAP       Moderate major depression (H) 01/15/2013     Priority: Medium     Speech/language delay 11/11/2010     Priority: Medium     Tobacco use disorder 11/11/2010     Priority: Medium     Nocturnal enuresis      Priority: Medium       Medications:  Current Outpatient Prescriptions   Medication Sig Dispense Refill     psyllium 0.52 G capsule Take 1 capsule (0.52 g) by mouth daily 180 capsule 3     albuterol (PROAIR HFA/PROVENTIL HFA/VENTOLIN HFA) 108 (90 BASE) MCG/ACT Inhaler Inhale 2 puffs into the lungs every 6 hours as needed for shortness of breath / dyspnea or wheezing 1 Inhaler 3     pantoprazole (PROTONIX) 40 MG EC tablet Take 1 tablet (40 mg) by mouth daily Take 30-60 minutes before first meal of the day 90 tablet 3     mometasone-formoterol (DULERA) 100-5 MCG/ACT oral inhaler Inhale 2 puffs into the lungs 2 times daily 1 Inhaler 11     ketoconazole (NIZORAL) 2 % shampoo Apply to the scalp and beard two times per week and wash off after 5 minutes. 120 mL 11     naproxen (NAPROSYN) 500 MG tablet Take 1 tablet (500 mg) by mouth 2 times daily (with meals) Take as needed, no more than 1000 mg per day. 60 tablet 2     lisinopril (PRINIVIL/ZESTRIL) 10 MG tablet Take 1 tablet (10 mg) by mouth every morning 90 tablet 3     aspirin  MG EC tablet Take one Aspirin tab twice daily for 5 weeks. 70 tablet 0     ARIPiprazole (ABILIFY) 5 MG tablet Take 5 mg by mouth every morning       cholecalciferol 5000 UNITS CAPS Take 5,000 Units by mouth daily        FLUoxetine (PROZAC) 20 MG capsule  "Take 20 mg by mouth every morning       fluticasone (FLONASE) 50 MCG/ACT spray Spray 2 sprays into both nostrils every morning       order for DME Equipment being ordered: CPAP mask and tubing 1 Units 0     ondansetron (ZOFRAN ODT) 4 MG disintegrating tablet Take 1 tablet (4 mg) by mouth every 8 hours as needed for nausea 15 tablet 0     ORDER FOR DME auto CPAP 7-15 cm/h20 1 each 0     [DISCONTINUED] ORDER FOR DME auto CPAP 7-15 cm/h20 1 each 0       Allergies:  Allergies   Allergen Reactions     No Clinical Screening - See Comments Other (See Comments)     Awoke from anesthesia with anger and ripping off dressing, after interstim placement, outside hospital 2013       Family history:  Family History   Problem Relation Age of Onset     Anxiety Disorder Mother      Depression Mother      Ulcerative Colitis Mother 18     Breast Cancer Maternal Grandmother      CEREBROVASCULAR DISEASE Maternal Grandmother      Breast Cancer Maternal Aunt      CANCER Maternal Grandfather      grt uncles colon cancer       Social history:  Social History   Substance Use Topics     Smoking status: Heavy Tobacco Smoker     Packs/day: 0.50     Years: 1.00     Types: Cigarettes     Smokeless tobacco: Never Used      Comment: Smokes E Cigs equal to 1 PPD     Alcohol use Yes      Comment: socially     Marital status: single.    Nursing Notes:   Aguila Gonzalez LPN  4/13/2017  7:40 AM  Signed  No chief complaint on file.      Vitals:    04/13/17 0733   BP: 125/82   BP Location: Left arm   Patient Position: Chair   Cuff Size: Adult Large   Pulse: 74   Temp: 97.9  F (36.6  C)   TempSrc: Oral   SpO2: 97%   Weight: (!) 151.2 kg (333 lb 6.4 oz)   Height: 1.803 m (5' 11\")       Body mass index is 46.5 kg/(m^2).    José LEBRON LPN                             Physical Examination:  /82 (BP Location: Left arm, Patient Position: Chair, Cuff Size: Adult Large)  Pulse 74  Temp 97.9  F (36.6  C) (Oral)  Ht 5' 11\"  Wt (!) 333 lb 6.4 oz  SpO2 97%  BMI " 46.5 kg/m2  General: alert, oriented, in no acute distress, sitting comfortably  Perianal external examination:  Perianal skin: Intact with mild excoriation and fecal material present  Lesions: No evidence of an external lesion, nodularity, or induration in the perianal region.  Eversion of buttocks: There was not evidence of an anal fissure. Details: N/A.  Skin tags or external hemorrhoids: None.  Digital rectal examination: Was performed.   Sphincter tone: Good.  Palpable lesions: No.  Other: None.  Prostate: Normal  Anoscopy: Was performed.   Hemorrhoids: No significant internal hemorrhoids.  Lesions: No.      Total face to face time was 15 minutes, >50% counseling.   This is a postop visit.    LAMBERT Sales, NP-C  Colon and Rectal Surgery  [facility]    This note was created using speech recognition software and may contain unintended word substitutions.

## 2017-04-17 ENCOUNTER — HOSPITAL ENCOUNTER (OUTPATIENT)
Dept: PHYSICAL THERAPY | Facility: CLINIC | Age: 37
Setting detail: THERAPIES SERIES
End: 2017-04-17
Attending: PAIN MEDICINE
Payer: COMMERCIAL

## 2017-04-17 PROCEDURE — 40000189 ZZH STATISTIC PT POOL VISIT: Performed by: PHYSICAL THERAPIST

## 2017-04-17 PROCEDURE — 97113 AQUATIC THERAPY/EXERCISES: CPT | Mod: GP | Performed by: PHYSICAL THERAPIST

## 2017-04-17 NOTE — PROGRESS NOTES
AQUATIC PHYSICAL THERAPY EXERCISE LOG (TRUNK)  Date    3/31/17  Ina Simmons PT, DPT Nya Geren, DPT  4/3/17 Nya Green, DPT Nya Green, DPT 4/13/17 Nya Green, DPT  4/17/17   Warm Up Ambulation (Forward/Side/Back/March/All) x2 ea X 2 each X 2 Each X 2 Each X 2 Each                                          Stretching/ROM Chin Tucks                  CROM (Flex/Ext/SB/Rot/All)                  Shoulder (Shrugs/Rolls)                  Scapular (Retraction/Depression)                  Upper Trunk (Levator/Scalene/Upper Trapezius)                  Pec Stretch (Unilateral/Bilateral)                  Posterior Shoulder                  Gluteal 2x30 sec ea 2 x 30 sec eac 2 x 30 sec each 2 x 30 sec 2 x 30 sec       Hamstring (On Step/Cuff) 2x30 sec ea, assist for support from noodle under ankle 2 x 30 sec each 2 x 30 sec each 2 x 30 sec 2 x 30 sec      Calf (In Hole/At Wall) 2x30 sec ea 2 x 30 sec each 2 x 30 sec each 2 x 30 sec 2 x 30 sec      Quad                  Hip Flexor (Kneel)                  Piriformis (Seated/Stand)                  Trunk ROM (Flex/Ext/SB/Rot/All) Flex/SB R and L x30 sec ea    flexion stretch hands on wall 30 sec x 2   Flexion stretch hands on wall 30 sec x 2      BAD RAGAZ                                                       Strengthening Abdominal Sets/ Pelvic Tilt Cues with exercises Cues performance during exercises Cues during exercises Cues for performance during exercises During exercises      Shoulder (Flex/Ext, Abd/Add, IR/ER, Circles, Alternation flex/ext for core)    With feet together for balance x 10 X 10 in wall sit with hydrotones for abdominal work With closed paddles feet together x 10 each With hydrotones, feet together      Horizon (Abd/Add, Diagonals)    As above As above As above As above      Rowing Arms                  UE PNF (D1/D2)                  Abdominal Sets (Push/Pull, side to side, push down with board)       With small KB X 10 with medium KB feet together        Squat Glut taps to wall x10 X 10 with wall support, mild LBP following X 10 with wall support X 10 glute taps with noodle X 10 Glute taps with noodle for support      Lunge Squat            X 10 with education and cues, wall support 1 UE      Hip (Flex/Ext, Abd/Add, single leg bike, circles, figure 8, All) x10 ea with UE support from wall, therapist facil at trunk and cues for visual fixation for posture X 10 with wall support, cues for posture X 10 with wall support, cues for posture X 10 with DBs for balance/strength progression X 10 with DBs for balance/strength progression      Heel/Toe Raises                  Step Ups (Forward, Lateral) x10 ea forward/lateral X 10 with med step for/lat X 10 forward, lateral x 10 single leg squats, manual cues for positioning X 10 without wall support forward and lateral X 10 without wall support, F/L      Noodle Push Downs with UE(B UE/1 UE) for core strengthening       X 10 with white noodle X 10 with white noodle       Noodle Push Downs with LE x10 ea with white/colored noodle X 10 with white noodle X 10 with white noodle X 10 with white noodle X 10 with white noodle      Stir the Pot/Punches with Dumbells          X 10 with white DBs       Sit to Stand at Bench                  Prone Plank on step                  Side Plank on step                                                       Aerobic Fast Walking                  Bike/Ski/Cesar/All x5 min biking in corner    X 5 min in corner                                              Balance Narrow Base of Support    X 30 sec            Tandem Stand    X 15 sec            Single Leg Stance    X 10 sec            Heel/Toe Walking    X 2 laps            3 Step and Stop x5 A/P stepping X 2 laps forward            Braiding                                                                           Cool Down Ambulation                  Deep Water Dangle                  Whirlpool

## 2017-04-18 ENCOUNTER — TRANSFERRED RECORDS (OUTPATIENT)
Dept: HEALTH INFORMATION MANAGEMENT | Facility: CLINIC | Age: 37
End: 2017-04-18

## 2017-04-18 ENCOUNTER — TELEPHONE (OUTPATIENT)
Dept: FAMILY MEDICINE | Facility: CLINIC | Age: 37
End: 2017-04-18

## 2017-04-18 NOTE — TELEPHONE ENCOUNTER
PA submitted thru covermymeds for pantoprazole (PROTONIX) 40 MG EC tablet    [employer] at [phone]  Patient ID# [id number]    Santiago AGUILAR

## 2017-04-20 ENCOUNTER — HOSPITAL ENCOUNTER (OUTPATIENT)
Dept: PHYSICAL THERAPY | Facility: CLINIC | Age: 37
Setting detail: THERAPIES SERIES
End: 2017-04-20
Attending: PAIN MEDICINE
Payer: COMMERCIAL

## 2017-04-20 PROCEDURE — 40000189 ZZH STATISTIC PT POOL VISIT: Performed by: PHYSICAL THERAPIST

## 2017-04-20 PROCEDURE — 97113 AQUATIC THERAPY/EXERCISES: CPT | Mod: GP | Performed by: PHYSICAL THERAPIST

## 2017-04-20 NOTE — PROGRESS NOTES
AQUATIC PHYSICAL THERAPY EXERCISE LOG (TRUNK)  Date    3/31/17  Ina Simmons PT, DPT Nya Green, DPT  4/3/17 Nya Green, DPT Nya Green, DPT 4/13/17 Nya Green, DPT  4/17/17 Nya Green, DPT  4/20/17   Warm Up Ambulation (Forward/Side/Back/March/All) x2 ea X 2 each X 2 Each X 2 Each X 2 Each X 2 Each                                                Stretching/ROM Chin Tucks                     CROM (Flex/Ext/SB/Rot/All)                     Shoulder (Shrugs/Rolls)                     Scapular (Retraction/Depression)                     Upper Trunk (Levator/Scalene/Upper Trapezius)                     Pec Stretch (Unilateral/Bilateral)                     Posterior Shoulder                     Gluteal 2x30 sec ea 2 x 30 sec eac 2 x 30 sec each 2 x 30 sec 2 x 30 sec  2 x 30 sec each      Hamstring (On Step/Cuff) 2x30 sec ea, assist for support from noodle under ankle 2 x 30 sec each 2 x 30 sec each 2 x 30 sec 2 x 30 sec 2 x 30 sec each      Calf (In Hole/At Wall) 2x30 sec ea 2 x 30 sec each 2 x 30 sec each 2 x 30 sec 2 x 30 sec 2 x 30 sec each      Quad               2 x 30 sec with noodle, wall support cues for improved posture      Hip Flexor (Kneel)                     Piriformis (Seated/Stand)                     Trunk ROM (Flex/Ext/SB/Rot/All) Flex/SB R and L x30 sec ea    flexion stretch hands on wall 30 sec x 2    Flexion stretch hands on wall 30 sec x 2       BAD RAGAZ                                                                Strengthening Abdominal Sets/ Pelvic Tilt Cues with exercises Cues performance during exercises Cues during exercises Cues for performance during exercises During exercises During exercises      Shoulder (Flex/Ext, Abd/Add, IR/ER, Circles, Alternation flex/ext for core)    With feet together for balance x 10 X 10 in wall sit with hydrotones for abdominal work With closed paddles feet together x 10 each With hydrotones, feet together With hydrotones, feet together x 10      Horizon  (Abd/Add, Diagonals)    As above As above As above As above As above      Rowing Arms                     UE PNF (D1/D2)                     Abdominal Sets (Push/Pull, side to side, push down with board)       With small KB X 10 with medium KB feet together   X 10 with med KB feet together      Squat Glut taps to wall x10 X 10 with wall support, mild LBP following X 10 with wall support X 10 glute taps with noodle X 10 Glute taps with noodle for support X 10 with 1 UE on wall      Lunge Squat             X 10 with education and cues, wall support 1 UE X 10 with cues/education for performance       Hip (Flex/Ext, Abd/Add, single leg bike, circles, figure 8, All) x10 ea with UE support from wall, therapist facil at trunk and cues for visual fixation for posture X 10 with wall support, cues for posture X 10 with wall support, cues for posture X 10 with DBs for balance/strength progression X 10 with DBs for balance/strength progression X 10 with DBs for balance challenge, decreased need for wall today       Heel/Toe Raises                     Step Ups (Forward, Lateral) x10 ea forward/lateral X 10 with med step for/lat X 10 forward, lateral x 10 single leg squats, manual cues for positioning X 10 without wall support forward and lateral X 10 without wall support, F/L X 10 without wall, F/L      Noodle Push Downs with UE(B UE/1 UE) for core strengthening       X 10 with white noodle X 10 with white noodle         Noodle Push Downs with LE x10 ea with white/colored noodle X 10 with white noodle X 10 with white noodle X 10 with white noodle X 10 with white noodle X 10 with white noodle      Stir the Pot/Punches with Dumbells          X 10 with white DBs         Sit to Stand at Bench                     Prone Plank on step                     Side Plank on step                                                                Aerobic Fast Walking                     Bike/Ski/Cesar/All x5 min biking in corner    X 5 min in  corner      X 5 mins in corner                                                 Balance Narrow Base of Support    X 30 sec               Tandem Stand    X 15 sec               Single Leg Stance    X 10 sec               Heel/Toe Walking    X 2 laps               3 Step and Stop x5 A/P stepping X 2 laps forward               Braiding                                                                                       Cool Down Ambulation                     Deep Water Dangle                     [place]

## 2017-04-24 ENCOUNTER — MYC MEDICAL ADVICE (OUTPATIENT)
Dept: FAMILY MEDICINE | Facility: CLINIC | Age: 37
End: 2017-04-24

## 2017-04-24 ENCOUNTER — HOSPITAL ENCOUNTER (OUTPATIENT)
Dept: PHYSICAL THERAPY | Facility: CLINIC | Age: 37
Setting detail: THERAPIES SERIES
End: 2017-04-24
Attending: PAIN MEDICINE
Payer: COMMERCIAL

## 2017-04-24 PROCEDURE — 97113 AQUATIC THERAPY/EXERCISES: CPT | Mod: GP | Performed by: PHYSICAL THERAPIST

## 2017-04-24 PROCEDURE — 40000189 ZZH STATISTIC PT POOL VISIT: Performed by: PHYSICAL THERAPIST

## 2017-04-24 NOTE — TELEPHONE ENCOUNTER
Claim # [id number] thru 5/31/17 wants to extend to 12/31/17     Physicians line is [phone] to recertify an existing claim - can you call and see if this can be done through you - if they need to talk to me you can pull me from a room.  I am ok with clearance through this date.

## 2017-04-24 NOTE — PROGRESS NOTES
AQUATIC PHYSICAL THERAPY EXERCISE LOG (TRUNK)  Date    3/31/17  Ina Simmons PT, DPT Nya Green, DPT  4/3/17 Nya Green, DPT Nya Green, DPT 4/13/17 Nya Green, DPT  4/17/17 Nya Green, DPT  4/20/17 Nya Green, DPT  4/24/17   Warm Up Ambulation (Forward/Side/Back/March/All) x2 ea X 2 each X 2 Each X 2 Each X 2 Each X 2 Each X 2 Each                                                      Stretching/ROM Chin Tucks                        CROM (Flex/Ext/SB/Rot/All)                        Shoulder (Shrugs/Rolls)                        Scapular (Retraction/Depression)                        Upper Trunk (Levator/Scalene/Upper Trapezius)                        Pec Stretch (Unilateral/Bilateral)                        Posterior Shoulder                        Gluteal 2x30 sec ea 2 x 30 sec eac 2 x 30 sec each 2 x 30 sec 2 x 30 sec  2 x 30 sec each 2 x 30 sec each      Hamstring (On Step/Cuff) 2x30 sec ea, assist for support from noodle under ankle 2 x 30 sec each 2 x 30 sec each 2 x 30 sec 2 x 30 sec 2 x 30 sec each 2 x 30 sec each      Calf (In Hole/At Wall) 2x30 sec ea 2 x 30 sec each 2 x 30 sec each 2 x 30 sec 2 x 30 sec 2 x 30 sec each 2 x 30 sec each      Quad                2 x 30 sec with noodle, wall support cues for improved posture 2 x 30 sec with noodle, wall support, cues for postur      Hip Flexor (Kneel)                        Piriformis (Seated/Stand)                        Trunk ROM (Flex/Ext/SB/Rot/All) Flex/SB R and L x30 sec ea    flexion stretch hands on wall 30 sec x 2    Flexion stretch hands on wall 30 sec x 2         BAD RAGAZ                                                                         Strengthening Abdominal Sets/ Pelvic Tilt Cues with exercises Cues performance during exercises Cues during exercises Cues for performance during exercises During exercises During exercises       Shoulder (Flex/Ext, Abd/Add, IR/ER, Circles, Alternation flex/ext for core)    With feet together for  balance x 10 X 10 in wall sit with hydrotones for abdominal work With closed paddles feet together x 10 each With hydrotones, feet together With hydrotones, feet together x 10 With hydrotones, feet together x 10- increased speed education      Horizon (Abd/Add, Diagonals)    As above As above As above As above As above As above      Rowing Arms                        UE PNF (D1/D2)                        Abdominal Sets (Push/Pull, side to side, push down with board)       With small KB X 10 with medium KB feet together    X 10 with med KB feet together X 10 with med KB, feet together      Squat Glut taps to wall x10 X 10 with wall support, mild LBP following X 10 with wall support X 10 glute taps with noodle X 10 Glute taps with noodle for support X 10 with 1 UE on wall X 10 without wall support      Lunge Squat             X 10 with education and cues, wall support 1 UE X 10 with cues/education for performance        Hip (Flex/Ext, Abd/Add, single leg bike, circles, figure 8, All) x10 ea with UE support from wall, therapist facil at trunk and cues for visual fixation for posture X 10 with wall support, cues for posture X 10 with wall support, cues for posture X 10 with DBs for balance/strength progression X 10 with DBs for balance/strength progression X 10 with DBs for balance challenge, decreased need for wall today  X 10 with DBs, education re: speed for increased challenge      Heel/Toe Raises                        Step Ups (Forward, Lateral) x10 ea forward/lateral X 10 with med step for/lat X 10 forward, lateral x 10 single leg squats, manual cues for positioning X 10 without wall support forward and lateral X 10 without wall support, F/L X 10 without wall, F/L X 10 without wall, F/L      Noodle Push Downs with UE(B UE/1 UE) for core strengthening       X 10 with white noodle X 10 with white noodle      X 10 with white noodle      Noodle Push Downs with LE x10 ea with white/colored noodle X 10 with white  noodle X 10 with white noodle X 10 with white noodle X 10 with white noodle X 10 with white noodle X 10 with white noodle      Stir the Pot/Punches with Dumbells          X 10 with white DBs            Sit to Stand at Bench                        Prone Plank on step                        Side Plank on step                                                                         Aerobic Fast Walking                        Bike/Ski/Cesar/All x5 min biking in corner    X 5 min in corner       X 5 mins in corner                                                        Balance Narrow Base of Support    X 30 sec            30 sec with EC      Tandem Stand    X 15 sec            X 10 sec B      Single Leg Stance    X 10 sec                  Heel/Toe Walking    X 2 laps                  3 Step and Stop x5 A/P stepping X 2 laps forward            X 2 laps      Braiding                                                                                                   Cool Down Ambulation                        Deep Water Dangle                        [place]

## 2017-04-25 NOTE — TELEPHONE ENCOUNTER
Writer spoke with Dominick at [facility] has been entered into system for extension.     Eliana Concepcion RN

## 2017-05-01 ENCOUNTER — HOSPITAL ENCOUNTER (OUTPATIENT)
Dept: PHYSICAL THERAPY | Facility: CLINIC | Age: 37
Setting detail: THERAPIES SERIES
End: 2017-05-01
Attending: PAIN MEDICINE
Payer: COMMERCIAL

## 2017-05-01 PROCEDURE — 40000189 ZZH STATISTIC PT POOL VISIT: Performed by: PHYSICAL THERAPIST

## 2017-05-01 PROCEDURE — 97113 AQUATIC THERAPY/EXERCISES: CPT | Mod: GP | Performed by: PHYSICAL THERAPIST

## 2017-05-01 NOTE — PROGRESS NOTES
"Outpatient Physical Therapy Progress Note and Discharge Note     Patient: Kimo Greenwood  : 1980    Beginning/End Dates of Reporting Period:  3/3/17 to 2017    Referring Provider: Deepali Mcnair Diagnosis: impaired functional mobility 2/2 chronic pain     Client Self Report: Pt reports his knee pain is improved. Pt reports low back pain continues to be ~4/10. Pt reports he will be coming to pool 2/week with ILS worker.    Goals:  Goal Identifier HEP   Goal Description Pt to demonstrate Mod(I)-(I) with aquatic HEP with assist from pt s mother or  prn for progress towards all goals and continued self-maintenance of chronic condition following d/c from physical therapy.  (Reports he will be coming to the pool with ILS worker.)   Target Date 17   Date Met   17   Progress:Pt independent with HEP, plans on coming 2/week.     Goal Identifier TUG (baseline 17.5 sec; L sided trunk shift in L stance phase consistent with hip weakness and leg length discrepancy)   Goal Description To complete TUG in < 13.5 sec with improved trunk stability to demonstrate improved functional L hip strength walking over short distances indoors and improved safety with indoor ambulation with dec fall risk. (13.67)   Target Date 17   Date Met      Progress:Pt technically did not meet this goal by .17 sec. Pt improved by 5 sec from baseline. Pt continues to need cues for decreased lateral shift.     Goal Identifier 30 sec sit < >stand (baseline: 3 reps from 18\", no UE assist, pain 5/10 increased to 9/10 x1 rep, continued with UE assit)   Goal Description Pt to perform >/= 8 reps sit to stand from 18 inch chair to demonstrate improved tolerance/decreased pain with transitions from standard height chair, improved functional strength.  (6 reps, 6/10 pain)   Target Date 17   Date Met      Progress: Improved from baseline, from 3 to 6 reps. Pt also reports pain 6/10 with this compared to 9/10 " reps.     Goal Identifier Walking tolerance (baseline: limited to 15-20 minutes with pain inc to 8-9/10.)   Goal Description Pt to report improved walking tolerance, able to walk >/= 30 minutes with pain maintained </= 7/10 with improved participation in grocery shopping with , return to recreational outings, and towards long term goal of return to work.   (20 mins, 3/10 pain)   Target Date 04/27/17   Date Met      Progress:Continues to be limited to 20 mins, but pain much improved.     Goal Identifier Activity tolerance (baseline: pt lays for a couple hours a day due to pain)   Goal Description Pt to report overall decreased average pain to </= 5/10 with decreased time spent laying to relieve pain to </= 20 minutes a day. (4/10, but continues to need 30 mins of lying down time)   Target Date 04/27/17   Date Met      Progress:Improved to decrease time needing to lay down during the day.        Progress Toward Goals:   Progress this reporting period: Pt was able to improve in each of the areas tested. Pt pain is overall decreased. Pt reports ability to walk more. Pt reports less time sleeping during the day. Pt improving walking speed on TUG test showing improved balance/strength. Pt was able to improve sit<>stand showing improved strength.    Plan:  Discharge from therapy.    Discharge:    Reason for Discharge: Pt independent with HEP.    Equipment Issued: handouts    Discharge Plan: Patient to continue home program.  Other services: Pt will also be going to [facility] clinic for B knee pain.

## 2017-05-01 NOTE — PROGRESS NOTES
AQUATIC PHYSICAL THERAPY EXERCISE LOG (TRUNK)  Date    3/31/17  Ina Simmons PT, DPT Nya Green, DPT  4/3/17 Nya Green, DPT Nya Green, DPT 4/13/17 Nya Green, DPT  4/17/17 Nya Green, DPT  4/20/17 Nya Green, DPT  4/24/17 Nya Green, DPT  5/1/17   Warm Up Ambulation (Forward/Side/Back/March/All) x2 ea X 2 each X 2 Each X 2 Each X 2 Each X 2 Each X 2 Each                                                             Stretching/ROM Chin Tucks                           CROM (Flex/Ext/SB/Rot/All)                           Shoulder (Shrugs/Rolls)                           Scapular (Retraction/Depression)                           Upper Trunk (Levator/Scalene/Upper Trapezius)                           Pec Stretch (Unilateral/Bilateral)                           Posterior Shoulder                           Gluteal 2x30 sec ea 2 x 30 sec eac 2 x 30 sec each 2 x 30 sec 2 x 30 sec  2 x 30 sec each 2 x 30 sec each 2 X 30 Sec each      Hamstring (On Step/Cuff) 2x30 sec ea, assist for support from noodle under ankle 2 x 30 sec each 2 x 30 sec each 2 x 30 sec 2 x 30 sec 2 x 30 sec each 2 x 30 sec each 2 x 30 sec each      Calf (In Hole/At Wall) 2x30 sec ea 2 x 30 sec each 2 x 30 sec each 2 x 30 sec 2 x 30 sec 2 x 30 sec each 2 x 30 sec each 2 x 30 sec each      Quad                2 x 30 sec with noodle, wall support cues for improved posture 2 x 30 sec with noodle, wall support, cues for postur       Hip Flexor (Kneel)                           Piriformis (Seated/Stand)                           Trunk ROM (Flex/Ext/SB/Rot/All) Flex/SB R and L x30 sec ea    flexion stretch hands on wall 30 sec x 2    Flexion stretch hands on wall 30 sec x 2            BAD RAGAZ                                                                                  Strengthening Abdominal Sets/ Pelvic Tilt Cues with exercises Cues performance during exercises Cues during exercises Cues for performance during exercises During exercises During  exercises   During exercises      Shoulder (Flex/Ext, Abd/Add, IR/ER, Circles, Alternation flex/ext for core)    With feet together for balance x 10 X 10 in wall sit with hydrotones for abdominal work With closed paddles feet together x 10 each With hydrotones, feet together With hydrotones, feet together x 10 With hydrotones, feet together x 10- increased speed education With hydrotones, feet together x 10      Horizon (Abd/Add, Diagonals)    As above As above As above As above As above As above As above      Rowing Arms                           UE PNF (D1/D2)                           Abdominal Sets (Push/Pull, side to side, push down with board)       With small KB X 10 with medium KB feet together    X 10 with med KB feet together X 10 with med KB, feet together X 10 with large KB      Squat Glut taps to wall x10 X 10 with wall support, mild LBP following X 10 with wall support X 10 glute taps with noodle X 10 Glute taps with noodle for support X 10 with 1 UE on wall X 10 without wall support X 10 without wall support or noodle      Lunge Squat             X 10 with education and cues, wall support 1 UE X 10 with cues/education for performance    X 10      Hip (Flex/Ext, Abd/Add, single leg bike, circles, figure 8, All) x10 ea with UE support from wall, therapist facil at trunk and cues for visual fixation for posture X 10 with wall support, cues for posture X 10 with wall support, cues for posture X 10 with DBs for balance/strength progression X 10 with DBs for balance/strength progression X 10 with DBs for balance challenge, decreased need for wall today  X 10 with DBs, education re: speed for increased challenge X 10 with DBs      Heel/Toe Raises                           Step Ups (Forward, Lateral) x10 ea forward/lateral X 10 with med step for/lat X 10 forward, lateral x 10 single leg squats, manual cues for positioning X 10 without wall support forward and lateral X 10 without wall support, F/L X 10  without wall, F/L X 10 without wall, F/L X 10 without wall support, placing step in water on own      Noodle Push Downs with UE(B UE/1 UE) for core strengthening       X 10 with white noodle X 10 with white noodle       X 10 with white noodle X 10 with white noodle in wall sit      Noodle Push Downs with LE x10 ea with white/colored noodle X 10 with white noodle X 10 with white noodle X 10 with white noodle X 10 with white noodle X 10 with white noodle X 10 with white noodle X 10 with white noode      Stir the Pot/Punches with Dumbells          X 10 with white DBs         X 10 with white DBs      Sit to Stand at Bench                           Prone Plank on step                           Side Plank on step                                                                                  Aerobic Fast Walking                           Bike/Ski/Cesar/All x5 min biking in corner    X 5 min in corner       X 5 mins in corner                                                                Balance Narrow Base of Support    X 30 sec             30 sec with EC       Tandem Stand    X 15 sec             X 10 sec B       Single Leg Stance    X 10 sec                     Heel/Toe Walking    X 2 laps                     3 Step and Stop x5 A/P stepping X 2 laps forward             X 2 laps       Braiding                                                                                                               Cool Down Ambulation                           Deep Water Dangle                           [place]

## 2017-05-08 ENCOUNTER — TELEPHONE (OUTPATIENT)
Dept: PALLIATIVE MEDICINE | Facility: CLINIC | Age: 37
End: 2017-05-08

## 2017-05-08 ENCOUNTER — OFFICE VISIT (OUTPATIENT)
Dept: PALLIATIVE MEDICINE | Facility: CLINIC | Age: 37
End: 2017-05-08
Payer: COMMERCIAL

## 2017-05-08 VITALS
BODY MASS INDEX: 46.44 KG/M2 | OXYGEN SATURATION: 98 % | SYSTOLIC BLOOD PRESSURE: 132 MMHG | HEART RATE: 83 BPM | WEIGHT: 315 LBS | DIASTOLIC BLOOD PRESSURE: 78 MMHG

## 2017-05-08 DIAGNOSIS — M54.50 CHRONIC LUMBOSACRAL PAIN: ICD-10-CM

## 2017-05-08 DIAGNOSIS — Z98.890 HISTORY OF HIP SURGERY: ICD-10-CM

## 2017-05-08 DIAGNOSIS — F12.90 MARIJUANA USE: ICD-10-CM

## 2017-05-08 DIAGNOSIS — M25.569 KNEE PAIN, UNSPECIFIED CHRONICITY, UNSPECIFIED LATERALITY: ICD-10-CM

## 2017-05-08 DIAGNOSIS — G89.4 CHRONIC PAIN SYNDROME: Primary | ICD-10-CM

## 2017-05-08 DIAGNOSIS — G89.29 CHRONIC LUMBOSACRAL PAIN: ICD-10-CM

## 2017-05-08 PROBLEM — J45.20 MILD INTERMITTENT ASTHMA: Status: ACTIVE | Noted: 2017-05-08

## 2017-05-08 PROCEDURE — 99214 OFFICE O/P EST MOD 30 MIN: CPT | Performed by: PAIN MEDICINE

## 2017-05-08 ASSESSMENT — PAIN SCALES - GENERAL: PAINLEVEL: SEVERE PAIN (6)

## 2017-05-08 NOTE — MR AVS SNAPSHOT
"              After Visit Summary   5/8/2017    Kimo Greenwood    MRN: [id number]           Patient Information     Date Of Birth          1980        Visit Information        Provider Department      5/8/2017 9:00 AM Deepali Coleman MD [facility]        Today's Diagnoses     Lumbosacral pain    -  1    Chronic pain of both knees          Care Instructions    PATIENT INSTRUCTIONS:    1. Will discontinue Naproxen, since this was not effective for you.    2. You have recently tried Voltaren gel (Diclofenac), which you feel helped.     Will order Voltaren gel, to be applied up to 4 times per day to knees (or back) as needed. Follow directions for use.     Will also order Flector patch, which can be used up to 2 times per day as needed; this may be more effective for your back.     Your insurance will need to approve these medications for you.     3. Can otherwise consider use of Lidocaine 4% patch, e.g. Salonpas. This is a numbing medication.     4. Continue water exercises at the [facility]. We talked about setting up a schedule (e.g. 2-3 times per week) to continue your exercises on your own.      5. We discussed possible procedure in the future. This might include \"lumbar medial branch block\". This involves testing the joints of the back with numbing medication. We perform 2 test procedures to help identify where your pain is coming from. If the tests are positive, we would then go on to the treatment procedure, which involves burning of the small nerves of the back joints.     6. Return to clinic as needed. You are welcome to call the clinic to schedule the test procedure at any time.     Thank you!    Scheduling number to the [facility]: [phone]. Call this number to schedule or change appointments.    Nurse Triage line: [phone]  Call this number with any questions or concerns. You can leave a message anytime. Please leave a detailed message. Calls are returned " between 8 AM and 4 PM Monday through Thursday and from 8 AM to 12 Noon on Fridays. We usually get back to you within 24 to 48 hours depending on the issue/request.     We believe regular attendance is key to your success in our program.    Any time you are unable to keep your appointment we ask that you call us at least 24 hours in advance to let us know. This will allow us to offer the appointment time to another patient.           Follow-ups after your visit        Who to contact     If you have questions or need follow up information about today's clinic visit or your schedule please contact [facility] directly at [phone].  Normal or non-critical lab and imaging results will be communicated to you by [employer], letter or phone within 4 business days after the clinic has received the results. If you do not hear from us within 7 days, please contact the clinic through [employer] or phone. If you have a critical or abnormal lab result, we will notify you by phone as soon as possible.  Submit refill requests through [employer] or call your pharmacy and they will forward the refill request to us. Please allow 3 business days for your refill to be completed.          Additional Information About Your Visit        [employer] Information     [employer] gives you secure access to your electronic health record. If you see a primary care provider, you can also send messages to your care team and make appointments. If you have questions, please call your primary care clinic.  If you do not have a primary care provider, please call [phone] and they will assist you.        Care EveryWhere ID     This is your Care EveryWhere ID. This could be used by other organizations to access your [place] medical records  [id number]        Your Vitals Were     Pulse Pulse Oximetry BMI (Body Mass Index)             83 98% 46.44 kg/m2          Blood Pressure from Last 3 Encounters:   05/08/17 132/78   04/13/17 125/82   03/20/17  120/76    Weight from Last 3 Encounters:   05/08/17 (!) 151 kg (333 lb)   04/13/17 (!) 151.2 kg (333 lb 6.4 oz)   03/20/17 (!) 150.4 kg (331 lb 8 oz)              Today, you had the following     No orders found for display         Today's Medication Changes          These changes are accurate as of: 5/8/17  9:50 AM.  If you have any questions, ask your nurse or doctor.               Start taking these medicines.        Dose/Directions    diclofenac 1 % Gel topical gel   Commonly known as:  VOLTAREN   Used for:  Lumbosacral pain, Chronic pain of both knees   Started by:  Deepali Coleman MD        Dose:  2-4 g   Place 2-4 g onto the skin 4 times daily as needed for moderate pain   Quantity:  100 g   Refills:  4       diclofenac 1.3 % Patch   Commonly known as:  FLECTOR   Used for:  Lumbosacral pain, Chronic pain of both knees   Started by:  Deepali Coleman MD        Dose:  1 patch   Place 1 patch onto the skin 2 times daily as needed for moderate pain   Quantity:  60 patch   Refills:  4         Stop taking these medicines if you haven't already. Please contact your care team if you have questions.     naproxen 500 MG tablet   Commonly known as:  NAPROSYN   Stopped by:  Deepali Coleman MD                Where to get your medicines      These medications were sent to [facility] [address] [place] [address] [address] AT [place] [address] & [address]  [address], [facility] [address]     Phone:  [phone]     diclofenac 1 % Gel topical gel    diclofenac 1.3 % Patch                Primary Care Provider Office Phone # Fax #    Kory [facility] [phone]       [facility] [address]  [facility] [address]        Thank you!     Thank you for choosing [facility]  for your care. Our goal is always to provide you with excellent care. Hearing back from our patients is one way we can continue to improve our services. Please take a few minutes  to complete the written survey that you may receive in the mail after your visit with us. Thank you!             Your Updated Medication List - Protect others around you: Learn how to safely use, store and throw away your medicines at www.disposemymeds.org.          This list is accurate as of: 5/8/17  9:50 AM.  Always use your most recent med list.                   Brand Name Dispense Instructions for use    albuterol 108 (90 BASE) MCG/ACT Inhaler    PROAIR HFA/PROVENTIL HFA/VENTOLIN HFA    1 Inhaler    Inhale 2 puffs into the lungs every 6 hours as needed for shortness of breath / dyspnea or wheezing       ARIPiprazole 5 MG tablet    ABILIFY     Take 5 mg by mouth every morning       aspirin  MG EC tablet     70 tablet    Take one Aspirin tab twice daily for 5 weeks.       cholecalciferol 5000 UNITS Caps      Take 5,000 Units by mouth daily       diclofenac 1 % Gel topical gel    VOLTAREN    100 g    Place 2-4 g onto the skin 4 times daily as needed for moderate pain       diclofenac 1.3 % Patch    FLECTOR    60 patch    Place 1 patch onto the skin 2 times daily as needed for moderate pain       FLUoxetine 20 MG capsule    PROzac     Take 20 mg by mouth every morning       fluticasone 50 MCG/ACT spray    FLONASE     Spray 2 sprays into both nostrils every morning       ketoconazole 2 % shampoo    NIZORAL    120 mL    Apply to the scalp and beard two times per week and wash off after 5 minutes.       lisinopril 10 MG tablet    PRINIVIL/ZESTRIL    90 tablet    Take 1 tablet (10 mg) by mouth every morning       mometasone-formoterol 100-5 MCG/ACT oral inhaler    DULERA    1 Inhaler    Inhale 2 puffs into the lungs 2 times daily       ondansetron 4 MG ODT tab    ZOFRAN ODT    15 tablet    Take 1 tablet (4 mg) by mouth every 8 hours as needed for nausea       * order for DME     1 each    auto CPAP 7-15 cm/h20       * order for DME     1 Units    Equipment being ordered: CPAP mask and tubing       pantoprazole  40 MG EC tablet    PROTONIX    90 tablet    Take 1 tablet (40 mg) by mouth daily Take 30-60 minutes before first meal of the day       psyllium 0.52 G capsule     180 capsule    Take 1 capsule (0.52 g) by mouth daily       * Notice:  This list has 2 medication(s) that are the same as other medications prescribed for you. Read the directions carefully, and ask your doctor or other care provider to review them with you.

## 2017-05-08 NOTE — TELEPHONE ENCOUNTER
Received fax from [facility] that states Rx for Diclofenac Sodium 1% Gel is not covered under patient's insurance.    Insurance Name: [employer]    PA Started by [facility]. Completed PA was faxed. Waiting for response.     Yolanda Gallo [facility]

## 2017-05-08 NOTE — PATIENT INSTRUCTIONS
"PATIENT INSTRUCTIONS:    1. Will discontinue Naproxen, since this was not effective for you.    2. You have recently tried Voltaren gel (Diclofenac), which you feel helped.     Will order Voltaren gel, to be applied up to 4 times per day to knees (or back) as needed. Follow directions for use.     Will also order Flector patch, which can be used up to 2 times per day as needed; this may be more effective for your back.     Your insurance will need to approve these medications for you.     3. Can otherwise consider use of Lidocaine 4% patch, e.g. Salonpas. This is a numbing medication.     4. Continue water exercises at the [facility]. We talked about setting up a schedule (e.g. 2-3 times per week) to continue your exercises on your own.      5. We discussed possible procedure in the future. This might include \"lumbar medial branch block\". This involves testing the joints of the back with numbing medication. We perform 2 test procedures to help identify where your pain is coming from. If the tests are positive, we would then go on to the treatment procedure, which involves burning of the small nerves of the back joints.     6. Return to clinic as needed. You are welcome to call the clinic to schedule the test procedure at any time.     Thank you!    Scheduling number to the [facility]: [phone]. Call this number to schedule or change appointments.    Nurse Triage line: [phone]  Call this number with any questions or concerns. You can leave a message anytime. Please leave a detailed message. Calls are returned between 8 AM and 4 PM Monday through Thursday and from 8 AM to 12 Noon on Fridays. We usually get back to you within 24 to 48 hours depending on the issue/request.     We believe regular attendance is key to your success in our program.    Any time you are unable to keep your appointment we ask that you call us at least 24 hours in advance to let us know. This will allow us to offer the " appointment time to another patient.

## 2017-05-11 NOTE — TELEPHONE ENCOUNTER
PA approved.  Effective date: 5/10/2017 to 5/10/2018  PA reference #: [id number]    Left message on Mother's cell that PA was approved. Faxed pharmacy approval letter.     Yolanda Gallo CMA   [facility]

## 2017-05-12 ENCOUNTER — TRANSFERRED RECORDS (OUTPATIENT)
Dept: HEALTH INFORMATION MANAGEMENT | Facility: CLINIC | Age: 37
End: 2017-05-12
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
## 2017-06-14 ENCOUNTER — MYC MEDICAL ADVICE (OUTPATIENT)
Dept: FAMILY MEDICINE | Facility: CLINIC | Age: 37
End: 2017-06-14

## 2017-06-14 DIAGNOSIS — F33.1 MAJOR DEPRESSIVE DISORDER, RECURRENT EPISODE, MODERATE (H): Primary | ICD-10-CM

## 2017-06-14 RX ORDER — ARIPIPRAZOLE 5 MG/1
5 TABLET ORAL EVERY MORNING
Qty: 30 TABLET | Refills: 11 | Status: SHIPPED | OUTPATIENT
Start: 2017-06-14 | End: 2017-12-11

## 2017-06-14 NOTE — TELEPHONE ENCOUNTER
Prozac and Abilify       Last Written Prescription Date: hist   Last Fill Quantity: ; # refills:   Last Office Visit with [facility], [facility] or  Health prescribing provider:  3/20/17        Last PHQ-9 score on record=   PHQ-9 SCORE 3/27/2017   Total Score -   Total Score 1       Lab Results   Component Value Date    AST 8 03/20/2017     Lab Results   Component Value Date    ALT 25 03/20/2017

## 2017-07-10 ENCOUNTER — TRANSFERRED RECORDS (OUTPATIENT)
Dept: HEALTH INFORMATION MANAGEMENT | Facility: CLINIC | Age: 37
End: 2017-07-10

## 2017-07-27 ENCOUNTER — TRANSFERRED RECORDS (OUTPATIENT)
Dept: HEALTH INFORMATION MANAGEMENT | Facility: CLINIC | Age: 37
End: 2017-07-27

## 2017-08-04 DIAGNOSIS — J45.20 MILD INTERMITTENT ASTHMA WITHOUT COMPLICATION: ICD-10-CM

## 2017-08-04 RX ORDER — ALBUTEROL SULFATE 90 UG/1
AEROSOL, METERED RESPIRATORY (INHALATION)
Qty: 18 G | Refills: 1 | Status: SHIPPED | OUTPATIENT
Start: 2017-08-04 | End: 2017-10-04

## 2017-08-04 NOTE — TELEPHONE ENCOUNTER
Pending Prescriptions:                       Disp   Refills    VENTOLIN  (90 BASE) MCG/ACT Inhale*18 g                Sig: INHALE 2 PUFFS INTO LUNGS EVERY SIX HOURS AS           NEEDED FOR SHORTNESS OF BREATH / DYSPNEA OR           WHEEZING           Last Written Prescription Date: 03/20/2017  Last Fill Quantity: 1, # refills: 3    Last Office Visit with G, P or [facility] prescribing provider:  03/20/2017   Future Office Visit:       Date of Last Asthma Action Plan Letter:   Asthma Action Plan Q1 Year    Topic Date Due     Asthma Action Plan - yearly  11/14/1986      Asthma Control Test:   ACT Total Scores 3/27/2017   ACT TOTAL SCORE (Goal Greater than or Equal to 20) 24   In the past 12 months, how many times did you visit the emergency room for your asthma without being admitted to the hospital? 0   In the past 12 months, how many times were you hospitalized overnight because of your asthma? 0       Date of Last Spirometry Test:   No results found for this or any previous visit.    Santiago AGUILAR

## 2017-08-04 NOTE — TELEPHONE ENCOUNTER
Prescription approved per [facility] Refill Protocol.  Anupama Ross RN, BSN

## 2017-08-17 ENCOUNTER — TRANSFERRED RECORDS (OUTPATIENT)
Dept: HEALTH INFORMATION MANAGEMENT | Facility: CLINIC | Age: 37
End: 2017-08-17

## 2017-10-04 DIAGNOSIS — J45.20 MILD INTERMITTENT ASTHMA WITHOUT COMPLICATION: ICD-10-CM

## 2017-10-04 RX ORDER — ALBUTEROL SULFATE 90 UG/1
AEROSOL, METERED RESPIRATORY (INHALATION)
Qty: 18 G | Refills: 0 | Status: SHIPPED | OUTPATIENT
Start: 2017-10-04 | End: 2017-11-03

## 2017-10-04 NOTE — TELEPHONE ENCOUNTER
Medication is being filled for 1 time refill only due to:  Patient needs to be seen because needs ACT and asthma follow up. Anupama Ross RN, BSN

## 2017-10-04 NOTE — TELEPHONE ENCOUNTER
Pending Prescriptions:                       Disp   Refills    VENTOLIN  (90 BASE) MCG/ACT Inhale*18 g                Sig: INHALE 2 PUFFS INTO LUNGS EVERY SIX HOURS AS           NEEDED FOR SHORTNESS OF BREATH / DYSPNEA OR           WHEEZING           Last Written Prescription Date: 08/04/2017  Last Fill Quantity: 18g, # refills: 1    Last Office Visit with G, [facility] or [facility] prescribing provider:  03/20/2017   Future Office Visit:       Date of Last Asthma Action Plan Letter:   Asthma Action Plan Q1 Year    Topic Date Due     Asthma Action Plan - yearly  03/20/2018      Asthma Control Test:   ACT Total Scores 3/27/2017   ACT TOTAL SCORE (Goal Greater than or Equal to 20) 24   In the past 12 months, how many times did you visit the emergency room for your asthma without being admitted to the hospital? 0   In the past 12 months, how many times were you hospitalized overnight because of your asthma? 0       Date of Last Spirometry Test:   No results found for this or any previous visit.      Santiago AGUILAR

## 2017-10-28 DIAGNOSIS — E55.9 VITAMIN D DEFICIENCY: Primary | ICD-10-CM

## 2017-11-03 DIAGNOSIS — E55.9 VITAMIN D DEFICIENCY: ICD-10-CM

## 2017-11-03 DIAGNOSIS — J45.20 MILD INTERMITTENT ASTHMA WITHOUT COMPLICATION: ICD-10-CM

## 2017-11-03 RX ORDER — ALBUTEROL SULFATE 90 UG/1
AEROSOL, METERED RESPIRATORY (INHALATION)
Qty: 18 G | Refills: 0 | Status: SHIPPED | OUTPATIENT
Start: 2017-11-03 | End: 2017-12-01

## 2017-11-03 NOTE — TELEPHONE ENCOUNTER
Routing refill request to provider for review/approval because:  Medication is reported/historical  -  Should pt be using 5000 U ?    With normal Vit D pt should take 4853-5439 U daily     RX sent for this amount and mother notified of change.     Eliana Concepcion, RN    Message handled by Nurse Triage with [facility] - provider name: Kory Hilton MD.

## 2017-11-03 NOTE — TELEPHONE ENCOUNTER
LOV 03/20/2017  
Spoke with Pt's mom Dalia.   Advised her Pt is due for asthma follow up with PCP.   She will call back to schedule OV.     Sent in 1 refill, further refills can be issued at future OV.     Deepali Bliss RN -- [facility]       
negative

## 2017-12-01 DIAGNOSIS — J45.20 MILD INTERMITTENT ASTHMA WITHOUT COMPLICATION: ICD-10-CM

## 2017-12-01 RX ORDER — ALBUTEROL SULFATE 90 UG/1
AEROSOL, METERED RESPIRATORY (INHALATION)
Qty: 1 INHALER | Refills: 0 | Status: SHIPPED | OUTPATIENT
Start: 2017-12-01 | End: 2018-12-31

## 2017-12-01 NOTE — TELEPHONE ENCOUNTER
Medication is being filled for 1 time refill only due to:  Patient needs to be seen because needs asthma follow up.  mychart sent.

## 2017-12-11 ENCOUNTER — TELEPHONE (OUTPATIENT)
Dept: PALLIATIVE MEDICINE | Facility: CLINIC | Age: 37
End: 2017-12-11

## 2017-12-11 ENCOUNTER — OFFICE VISIT (OUTPATIENT)
Dept: FAMILY MEDICINE | Facility: CLINIC | Age: 37
End: 2017-12-11
Payer: COMMERCIAL

## 2017-12-11 VITALS
RESPIRATION RATE: 18 BRPM | SYSTOLIC BLOOD PRESSURE: 130 MMHG | HEIGHT: 71 IN | TEMPERATURE: 97.9 F | OXYGEN SATURATION: 97 % | HEART RATE: 68 BPM | DIASTOLIC BLOOD PRESSURE: 72 MMHG | WEIGHT: 315 LBS | BODY MASS INDEX: 44.1 KG/M2

## 2017-12-11 DIAGNOSIS — J45.20 MILD INTERMITTENT ASTHMA WITHOUT COMPLICATION: ICD-10-CM

## 2017-12-11 DIAGNOSIS — Z23 NEED FOR PROPHYLACTIC VACCINATION AND INOCULATION AGAINST INFLUENZA: ICD-10-CM

## 2017-12-11 DIAGNOSIS — F33.1 MAJOR DEPRESSIVE DISORDER, RECURRENT EPISODE, MODERATE (H): ICD-10-CM

## 2017-12-11 DIAGNOSIS — L21.9 SEBORRHEIC DERMATITIS: ICD-10-CM

## 2017-12-11 DIAGNOSIS — K21.9 LPRD (LARYNGOPHARYNGEAL REFLUX DISEASE): ICD-10-CM

## 2017-12-11 DIAGNOSIS — F17.200 TOBACCO USE DISORDER: ICD-10-CM

## 2017-12-11 DIAGNOSIS — K29.80 DUODENITIS: ICD-10-CM

## 2017-12-11 DIAGNOSIS — I10 HYPERTENSION GOAL BP (BLOOD PRESSURE) < 140/90: ICD-10-CM

## 2017-12-11 DIAGNOSIS — E66.01 MORBID OBESITY (H): ICD-10-CM

## 2017-12-11 DIAGNOSIS — M54.50 CHRONIC LUMBOSACRAL PAIN: ICD-10-CM

## 2017-12-11 DIAGNOSIS — G89.29 CHRONIC LOW BACK PAIN WITHOUT SCIATICA, UNSPECIFIED BACK PAIN LATERALITY: ICD-10-CM

## 2017-12-11 DIAGNOSIS — F32.1 MODERATE MAJOR DEPRESSION (H): Primary | ICD-10-CM

## 2017-12-11 DIAGNOSIS — F12.10 MARIJUANA ABUSE: ICD-10-CM

## 2017-12-11 DIAGNOSIS — G89.29 CHRONIC LUMBOSACRAL PAIN: ICD-10-CM

## 2017-12-11 DIAGNOSIS — K59.01 SLOW TRANSIT CONSTIPATION: ICD-10-CM

## 2017-12-11 DIAGNOSIS — M25.569 KNEE PAIN, UNSPECIFIED CHRONICITY, UNSPECIFIED LATERALITY: ICD-10-CM

## 2017-12-11 DIAGNOSIS — M54.50 CHRONIC LOW BACK PAIN WITHOUT SCIATICA, UNSPECIFIED BACK PAIN LATERALITY: ICD-10-CM

## 2017-12-11 DIAGNOSIS — K22.70 BARRETT'S ESOPHAGUS DETERMINED BY BIOPSY: ICD-10-CM

## 2017-12-11 DIAGNOSIS — E55.9 VITAMIN D DEFICIENCY: ICD-10-CM

## 2017-12-11 PROCEDURE — 90471 IMMUNIZATION ADMIN: CPT | Performed by: FAMILY MEDICINE

## 2017-12-11 PROCEDURE — 99214 OFFICE O/P EST MOD 30 MIN: CPT | Mod: 25 | Performed by: FAMILY MEDICINE

## 2017-12-11 PROCEDURE — 90686 IIV4 VACC NO PRSV 0.5 ML IM: CPT | Performed by: FAMILY MEDICINE

## 2017-12-11 RX ORDER — PANTOPRAZOLE SODIUM 40 MG/1
40 TABLET, DELAYED RELEASE ORAL DAILY
Qty: 90 TABLET | Refills: 3 | Status: SHIPPED | OUTPATIENT
Start: 2017-12-11 | End: 2018-11-29

## 2017-12-11 RX ORDER — LISINOPRIL 10 MG/1
10 TABLET ORAL EVERY MORNING
Qty: 90 TABLET | Refills: 3 | Status: SHIPPED | OUTPATIENT
Start: 2017-12-11 | End: 2018-11-29

## 2017-12-11 RX ORDER — ARIPIPRAZOLE 5 MG/1
5 TABLET ORAL EVERY MORNING
Qty: 30 TABLET | Refills: 11 | Status: ON HOLD | OUTPATIENT
Start: 2017-12-11 | End: 2018-06-08

## 2017-12-11 RX ORDER — KETOCONAZOLE 20 MG/ML
SHAMPOO TOPICAL
Qty: 120 ML | Refills: 11 | Status: SHIPPED | OUTPATIENT
Start: 2017-12-11 | End: 2018-05-16

## 2017-12-11 ASSESSMENT — ANXIETY QUESTIONNAIRES
5. BEING SO RESTLESS THAT IT IS HARD TO SIT STILL: NOT AT ALL
2. NOT BEING ABLE TO STOP OR CONTROL WORRYING: NOT AT ALL
3. WORRYING TOO MUCH ABOUT DIFFERENT THINGS: NOT AT ALL
GAD7 TOTAL SCORE: 0
1. FEELING NERVOUS, ANXIOUS, OR ON EDGE: NOT AT ALL
7. FEELING AFRAID AS IF SOMETHING AWFUL MIGHT HAPPEN: NOT AT ALL
6. BECOMING EASILY ANNOYED OR IRRITABLE: NOT AT ALL

## 2017-12-11 ASSESSMENT — PATIENT HEALTH QUESTIONNAIRE - PHQ9
SUM OF ALL RESPONSES TO PHQ QUESTIONS 1-9: 18
5. POOR APPETITE OR OVEREATING: NOT AT ALL

## 2017-12-11 NOTE — NURSING NOTE
"Chief Complaint   Patient presents with     Depression       Initial /72 (BP Location: Right arm, Patient Position: Chair, Cuff Size: Adult Regular)  Pulse 68  Temp 97.9  F (36.6  C) (Oral)  Resp 18  Ht 5' 11\" (1.803 m)  Wt (!) 350 lb 6.4 oz (158.9 kg)  SpO2 97%  BMI 48.87 kg/m2 Estimated body mass index is 48.87 kg/(m^2) as calculated from the following:    Height as of this encounter: 5' 11\" (1.803 m).    Weight as of this encounter: 350 lb 6.4 oz (158.9 kg).    Medication Reconciliation: complete    Health Maintenance addressed:  NONE    n/a    Deepali Galvan CMA                         "

## 2017-12-11 NOTE — PROGRESS NOTES

## 2017-12-11 NOTE — TELEPHONE ENCOUNTER
Left VM for patient to schedule a new evaluation.        Erin BRIGGS    [facility]

## 2017-12-11 NOTE — MR AVS SNAPSHOT
After Visit Summary   12/11/2017    Kimo Greenwood    MRN: [id number]           Patient Information     Date Of Birth          1980        Visit Information        Provider Department      12/11/2017 9:15 AM Kory Hudson MD [facility]        Today's Diagnoses     Moderate major depression (H)    -  1    Tobacco use disorder        Hypertension goal BP (blood pressure) < 140/90        Morbid obesity (H)        Mild intermittent asthma without complication        Patel's esophagus determined by biopsy        Marijuana abuse        Chronic low back pain without sciatica, unspecified back pain laterality        Chronic lumbosacral pain        Knee pain, unspecified chronicity, unspecified laterality        Major depressive disorder, recurrent episode, moderate (H)        Seborrheic dermatitis        Duodenitis        LPRD (laryngopharyngeal reflux disease)        Slow transit constipation        Vitamin D deficiency           Follow-ups after your visit        Additional Services     CARE COORDINATION REFERRAL       Services are provided by a Care Coordinator for people with complex needs such as: medical, social, or financial troubles.  The Care Coordinator works with the patient and their Primary Care Provider to determine health goals, obtain resources, achieve outcomes, and develop care plans that help coordinate the patient's care.     Reason for Referral: Chemical Dependence: Patient interested in treatment options    Provide additional details for Care Coordination to best meet the patient's current needs: marijuana use    Clinical Staff have discussed the Care Coordination Referral with the patient and/or caregiver: yes            PAIN MANAGEMENT REFERRAL       [place] [facility] [place] [facility] -    Reason for Referral: Comprehensive Evaluation and Management    Please complete the following questions:    What is your diagnosis  for the patient's pain? DJD lumbar spine    Do you have any specific questions for the pain specialist? No    Are there any red flags that may impact the assessment or management of the patient? Mental Illness / Communication Difficulties (explain): low IQ    For any questions, contact the [facility] at [phone].     **ANY DIAGNOSTIC TESTS THAT ARE NOT IN EPIC SHOULD BE SENT TO THE PAIN CENTER**    REGARDING OPIOID MEDICATIONS:  We will always address appropriateness of opioid pain medications, but we generally will not automatically take on a prescribing role. When we do take on prescribing of opioids for chronic pain, it is in collaboration with the referring physician for an intermediate period of time (months), with an expectation that the primary physician or provider will assume the prescribing role if medications are effective at stable doses with demonstrated compliance.  Therefore, please do not assume that your prescribing responsibilities end on the day of pain clinic consultation.  Is this agreeable to you? YES    Please be aware that coverage of these services is subject to the terms and limitations of your health insurance plan.  Call member services at your health plan with any benefit or coverage questions.      Please bring the following with you to your appointment:    (1) Any X-Rays, CTs or MRIs which have been performed.  Contact the facility where they were done to arrange for  prior to your scheduled appointment.    (2) List of current medications   (3) This referral request   (4) Any documents/labs given to you for this referral                  Who to contact     If you have questions or need follow up information about today's clinic visit or your schedule please contact [facility] directly at [phone].  Normal or non-critical lab and imaging results will be communicated to you by MyChart, letter or phone within 4 business days after the  "clinic has received the results. If you do not hear from us within 7 days, please contact the clinic through Yuan[facility] or phone. If you have a critical or abnormal lab result, we will notify you by phone as soon as possible.  Submit refill requests through YuanV or call your pharmacy and they will forward the refill request to us. Please allow 3 business days for your refill to be completed.          Additional Information About Your Visit        YuanV Information     YuanV gives you secure access to your electronic health record. If you see a primary care provider, you can also send messages to your care team and make appointments. If you have questions, please call your primary care clinic.  If you do not have a primary care provider, please call [phone] and they will assist you.        Care EveryWhere ID     This is your Care EveryWhere ID. This could be used by other organizations to access your [facility] medical records  [id number]        Your Vitals Were     Pulse Temperature Respirations Height Pulse Oximetry BMI (Body Mass Index)    68 97.9  F (36.6  C) (Oral) 18 5' 11\" (1.803 m) 97% 48.87 kg/m2       Blood Pressure from Last 3 Encounters:   12/11/17 130/72   05/08/17 132/78   04/13/17 125/82    Weight from Last 3 Encounters:   12/11/17 (!) 350 lb 6.4 oz (158.9 kg)   05/08/17 (!) 333 lb (151 kg)   04/13/17 (!) 333 lb 6.4 oz (151.2 kg)              We Performed the Following     CARE COORDINATION REFERRAL     PAIN MANAGEMENT REFERRAL          Today's Medication Changes          These changes are accurate as of: 12/11/17  9:55 AM.  If you have any questions, ask your nurse or doctor.               These medicines have changed or have updated prescriptions.        Dose/Directions    cholecalciferol 2000 UNITS Caps   This may have changed:  Another medication with the same name was removed. Continue taking this medication, and follow the directions you see here.   Used for:  Vitamin D deficiency "   Changed by:  Kory Hudson MD        Dose:  1 capsule   Take 1 capsule by mouth daily   Quantity:  30 capsule   Refills:  11         Stop taking these medicines if you haven't already. Please contact your care team if you have questions.     diclofenac 1.3 % Patch   Commonly known as:  FLECTOR   Stopped by:  Kory Hudson MD                Where to get your medicines      These medications were sent to [facility] [address]  [address], [place] [address]     Phone:  [phone]     ARIPiprazole 5 MG tablet    cholecalciferol 2000 UNITS Caps    diclofenac 1 % Gel topical gel    FLUoxetine 20 MG capsule    ketoconazole 2 % shampoo    lisinopril 10 MG tablet    mometasone-formoterol 100-5 MCG/ACT oral inhaler    pantoprazole 40 MG EC tablet    psyllium 0.52 G capsule                Primary Care Provider Office Phone # Fax #    Koyr Hudson [phone] [address] [facility] [address]        Equal Access to Services     [facility]: Hadii aad ku hadasho Soomaali, waaxda luqadaha, qaybta kaalmada adeegyada, waxay idiin hayaan marek khanshu mclain . So [facility] [phone].    ATENCIÓN: Si habla español, tiene a meng disposición servicios gratuitos de asistencia lingüística. Llame al [phone].    We comply with applicable federal civil rights laws and [place] laws. We do not discriminate on the basis of race, color, national origin, age, disability, sex, sexual orientation, or gender identity.            Thank you!     Thank you for choosing [facility]  for your care. Our goal is always to provide you with excellent care. Hearing back from our patients is one way we can continue to improve our services. Please take a few minutes to complete the written survey that you may receive in the mail after your visit with us. Thank you!             Your Updated Medication List - Protect  others around you: Learn how to safely use, store and throw away your medicines at www.disposemymeds.org.          This list is accurate as of: 12/11/17  9:55 AM.  Always use your most recent med list.                   Brand Name Dispense Instructions for use Diagnosis    ARIPiprazole 5 MG tablet    ABILIFY    30 tablet    Take 1 tablet (5 mg) by mouth every morning    Major depressive disorder, recurrent episode, moderate (H)       aspirin  MG EC tablet     70 tablet    Take one Aspirin tab twice daily for 5 weeks.    Status post total replacement of left hip       cholecalciferol 2000 UNITS Caps     30 capsule    Take 1 capsule by mouth daily    Vitamin D deficiency       diclofenac 1 % Gel topical gel    VOLTAREN    100 g    Place 2-4 g onto the skin 4 times daily as needed for moderate pain    Chronic lumbosacral pain, Knee pain, unspecified chronicity, unspecified laterality       FLUoxetine 20 MG capsule    PROzac    30 capsule    Take 1 capsule (20 mg) by mouth every morning    Major depressive disorder, recurrent episode, moderate (H)       fluticasone 50 MCG/ACT spray    FLONASE     Spray 2 sprays into both nostrils every morning        ketoconazole 2 % shampoo    NIZORAL    120 mL    Apply to the scalp and beard two times per week and wash off after 5 minutes.    Seborrheic dermatitis       lisinopril 10 MG tablet    PRINIVIL/ZESTRIL    90 tablet    Take 1 tablet (10 mg) by mouth every morning    Hypertension goal BP (blood pressure) < 140/90       mometasone-formoterol 100-5 MCG/ACT oral inhaler    DULERA    1 Inhaler    Inhale 2 puffs into the lungs 2 times daily    Mild intermittent asthma without complication       ondansetron 4 MG ODT tab    ZOFRAN ODT    15 tablet    Take 1 tablet (4 mg) by mouth every 8 hours as needed for nausea        * order for DME     1 each    auto CPAP 7-15 cm/h20    Obstructive sleep apnea (adult) (pediatric)       * order for DME     1 Units    Equipment being  ordered: CPAP mask and tubing    LOREN (obstructive sleep apnea)       pantoprazole 40 MG EC tablet    PROTONIX    90 tablet    Take 1 tablet (40 mg) by mouth daily Take 30-60 minutes before first meal of the day    Duodenitis, LPRD (laryngopharyngeal reflux disease)       psyllium 0.52 G capsule     180 capsule    Take 1 capsule (0.52 g) by mouth daily    Slow transit constipation       VENTOLIN  (90 BASE) MCG/ACT Inhaler   Generic drug:  albuterol     1 Inhaler    INHALE 2 PUFFS INTO LUNGS EVERY SIX HOURS AS NEEDED FOR SHORTNESS OF BREATH / DYSPNEA OR WHEEZING    Mild intermittent asthma without complication       * Notice:  This list has 2 medication(s) that are the same as other medications prescribed for you. Read the directions carefully, and ask your doctor or other care provider to review them with you.

## 2017-12-12 ENCOUNTER — CARE COORDINATION (OUTPATIENT)
Dept: CARE COORDINATION | Facility: CLINIC | Age: 37
End: 2017-12-12

## 2017-12-12 ASSESSMENT — ASTHMA QUESTIONNAIRES: ACT_TOTALSCORE: 22

## 2017-12-12 ASSESSMENT — ANXIETY QUESTIONNAIRES: GAD7 TOTAL SCORE: 0

## 2017-12-12 NOTE — TELEPHONE ENCOUNTER
Patient is former patient of Dr. Coleman. Re-referred, sending to nursing for review.     Marizol Woodall    [facility]

## 2017-12-12 NOTE — LETTER
[facility]  Complex Care Plan  About Me  Patient Name:  Kimo Greenwood    YOB: 1980  Age:   37 year old   Darcie MRN: [id number] Telephone Information:     Home Phone [phone]   Mobile [phone]       Address:    [address] [place] [address] Email address:  [email or web address]      Emergency Contact(s)  Name Relationship Lgl Grd Work Phone Home Phone Mobile Phone   1Nba GOODWIN* Mother Yes None [phone]   2. NO SECONDARY C*    none            Primary language:  English     needed? No   [employer]:  [phone] op. 1  Other communication barriers: No  Preferred Method of Communication:  Phone  Current living arrangement: I live in a private home with family  Mobility Status/ Medical Equipment: Independent  Other information to know about me:    Health Maintenance  Health Maintenance Reviewed: Due/Overdue   Health Maintenance Due   Topic Date Due     URINE DRUG SCREEN Q1 YR  08/22/2016     DEPRESSION ACTION PLAN Q1 YR  09/16/2016     ASTHMA CONTROL TEST Q6 MOS  09/20/2017        My Access Plan  Medical Emergency 911   Primary Clinic Line [facility] [phone]   24 Hour Appointment Line [phone] or  [phone]) (toll-free)   24 Hour Nurse Line [phone] (toll-free)   Preferred Urgent Care [facility], [phone]   Preferred Hospital [facility]  [phone]   Preferred Pharmacy [facility] [address] [place] [address] [address] AT [facility] [address]     Behavioral Health Crisis Line The National Suicide Prevention Lifeline at 1-343.305.9792 or 911     My Care Team Members  Patient Care Team       Relationship Specialty Notifications Start End    Kory Hudson MD PCP - General Family Practice  11/9/10     Phone: [phone] Fax: [phone] [address] [facility] [address]     Didier Ibarra MD MD Urology  9/29/16     Phone: [phone] Fax: [phone]         [facility] [address] [address]    Deana Adrian APRN CNP Nurse Practitioner   12/29/16     Phone: [phone] Fax: [phone]         [address] [facility] [address] [facility] [address]    Danna Panda Clinic Care Coordinator  - Clinical Admissions 12/12/17     Phone: [phone]               My Medical and Care Information  Problem List   Patient Active Problem List   Diagnosis     Speech/language delay     Tobacco use disorder     Nocturnal enuresis     Moderate major depression (H)     LOREN (obstructive sleep apnea)     Insomnia     Hypertension goal BP (blood pressure) < 140/90     Morbid obesity (H)     Leukocytosis     Suicidal ideation     Chronic low back pain     Bilateral low back pain with left-sided sciatica     History of colonic polyps     Mild intellectual disability     S/P total hip arthroplasty     Vitamin D deficiency     LPRD (laryngopharyngeal reflux disease)     Patel's esophagus determined by biopsy     Mild intermittent asthma        Care Coordination Start Date: 12/12/17   Frequency of Care Coordination: 2-3 weeks   Form Last Updated: 12/12/2017

## 2017-12-12 NOTE — PROGRESS NOTES
Clinic Care Coordination Contact  Care Team Conversations    New referral: needs for CD tx options for chemical dependency    Called pt and mom answered. CTC on file and mom was present during yesterday's OV. She also claims guardianship but no paperwork is on file.     Mom feels the pt needs inpatient treatment. States he is on a MA product. CCSW stated he will need a rule 25 and the  will decide what treatment option is the most appropriate and will submit their assessment to insurance for approval.   CCSW provided two agencies near [place] that do rule 25s  [facility] ([place] [phone])   [facility] ([phone])     Mom states the pt already goes to [facility] for counseling and will try with them first to get an appointment. Feels this is a good place to start. Took this CCSW's contact information and agrees to a 2 week follow up.    PLAN:  CCSW will mail out contact letter and care plan  Pt will outreach as needed  CCSW will follow up in 2 weeks    Danna Panda, Social Work Care Coordinator, LGSW, MSW  [facility]: [place], [place], [place], and [place]   P:[phone]/ Signed December 12, 2017

## 2017-12-12 NOTE — LETTER
December 12, 2017      Kimo Greenwood  [address] [place] [address]        Dear Willard,    I am the Social Work Care Coordinator that works with your primary care provider's clinic. I wanted to introduce myself and provide you with my contact information for you to be able to call me with any questions or concerns. Below is a description of what Clinic Care Coordination is and how I can further assist you.     My role as the care coordinator  is to help your manage your health and improve access to the [facility] system in the most efficient manner.I can assist you with community referrals for various resources (Housing, transportation, employment, financial resources, health insurance), developing goals to address behavioral and psychosocial needs, and connecting you with chemical dependency and mental health services.    I am a free and available resource Monday through Friday here at the clinic. Feel free to save my contact information and outreach to me anytime at my direct number  [phone]. We at [facility] are focused on providing you with the highest-quality healthcare experience possible.     Sincerely,    AI Rivero, MSW  Social Work Care Coordinator  P:[phone]  [facility] [place], [place], [place], and [place]    Enclosed: I have enclosed a copy of the Complex Care Plan. This has helpful information and goals that we have talked about. Please keep this in an easy to access place to use as needed.

## 2017-12-22 ENCOUNTER — CARE COORDINATION (OUTPATIENT)
Dept: CARE COORDINATION | Facility: CLINIC | Age: 37
End: 2017-12-22

## 2017-12-22 NOTE — PROGRESS NOTES
Clinic Care Coordination Contact  Care Coordination follow up    Reason: depression, drug use, recent positive UA for marijuana,job loss    Discussion: Spoke with mom CTC on file and proclaimed guardian. On 12/14 the patient was seen at [facility] in [place] for a rule 25. Was told the recommendation is for inpatient treatment and the rule 25 would be completed in roughly 8 business days to begin treatment meaning it may be received sometime late next week.  The  recommended the pt go to [facility] as they are more specialized in mental health and disabilities which would be a better fit for the pt. CCSW encouraged mom to call [facility] to see if he can be added to the waitlist for placement prior to receiving the rule 25 completed form.     Lastly CCSW addressed the guardianship paperwork. States there is none on file, only a CTC for mom. Mom states she will drop by the clinic today and give them a copy of the guardianship paperwork.     PLAN:  Pt will outreach as needed  CCSW will follow up in 2 -3 weeks    Danna Panda, Social Work Care Coordinator, LGSW, MSW  [facility]: [facility]   P:[phone]/ Signed December 22, 2017

## 2018-01-05 ENCOUNTER — TRANSFERRED RECORDS (OUTPATIENT)
Dept: HEALTH INFORMATION MANAGEMENT | Facility: CLINIC | Age: 38
End: 2018-01-05

## 2018-01-08 NOTE — TELEPHONE ENCOUNTER
Agree, will send note back to PCP  In setting of active need for CD treatment, patient should focus on this aspect of care, as that needs to be managed first.  Some of the treatments for CD may actually help his pain as well.    FYI to PCP, and our  should tell patient he can be referred after 3 months of working with CD treatment    Prachi Medeiros MD  [facility]

## 2018-01-08 NOTE — TELEPHONE ENCOUNTER
Reviewed chart. Pt is in the process of pursuing CD treatment; note dated 12/22 by  states:    On 12/14 the patient was seen at [facility] in [place] for a rule 25. Was told the recommendation is for inpatient treatment and the rule 25 would be completed in roughly 8 business days to begin treatment meaning it may be received sometime late next week. The  recommended the pt go to [facility] as they are more specialized in mental health and disabilities which would be a better fit for the pt. CCSW encouraged mom to call [facility] to see if he can be added to the waitlist for placement prior to receiving the rule 25 completed form.   ----------------  With pending inpatient treatment for chemical dependency, initiating pain clinic treatment may not be appropriate at this time since his availability may be limited.  Addressing the CD issue prior to participation in our program typically contributes to successful pain management. Will route to Prachi Medeiros MD for review.     INGRIS Grover, RN-BC  Patient Care Supervisor/Care Coordinator  [facility]

## 2018-01-09 NOTE — TELEPHONE ENCOUNTER
Notify patient of below - can involve care coordination for help in CD treatment if they have not yet been involved.

## 2018-01-18 ENCOUNTER — CARE COORDINATION (OUTPATIENT)
Dept: CARE COORDINATION | Facility: CLINIC | Age: 38
End: 2018-01-18

## 2018-01-18 NOTE — PROGRESS NOTES
Clinic Care Coordination Contact  Care Coordination follow up    Reason: Patient to enter inpatient. Had rule 25, waiting on follow-up.     Discussion: [facility] reviewing rule 25. Got rule 25 1 week ago and it could take them 1-3 weeks to review      Pt doing ok, home a lot more. Mom giving him less access to her car.  States he has also had less access to his friends who use and the friends he has been with seem supportive of his sobriety. He has not had any support for his chem dep in the meantime. Discussed having mom reestablish him with his therapist in [facility]. Also discussed community support groups like [facility] and NA. Mom will look into them and discuss with the patient. Will let this CCSW know if she would like resources    PLAN:  Pt will outreach as needed  CCSW will follow up in 2-3 weeks    Danna Panda, Social Work Care Coordinator, LGSW, MSW  [facility]: [facility] and [place]   P:[phone]/ Signed January 18, 2018

## 2018-01-24 ENCOUNTER — TELEPHONE (OUTPATIENT)
Dept: PALLIATIVE MEDICINE | Facility: CLINIC | Age: 38
End: 2018-01-24

## 2018-01-24 NOTE — TELEPHONE ENCOUNTER
Pre-screening questions for Radiology Injections:    Injection to be done at which interventional clinic site? [facility]    Procedure ordered by Dr. Hudson    Procedure ordered? Lumbar Medial Branch Block    What insurance would patient like us to bill for this procedure? [employer]      Worker's comp- Any injection DO NOT SCHEDULE and route to Marizol Woodall.      [employer] insurance - If scheduling an SI joint injection DO NOT SCHEDULE and route to Erin Harman.     [employer]- MBB's must be scheduled at LEAST two weeks apart      [employer] - Any injection besides hip/shoulder/knee joint DO NOT SCHEDULE and route to Erin Hammer. She will obtain PA and call pt back to schedule procedure or notify pt of denial.     [employer]- PA required for all MBB's    Any chance of pregnancy? Not Applicable   If YES, do NOT schedule and route to RN pool    Is an  needed? No     Patient has a drive home? (mandatory) Yes     Is patient taking any blood thinners (plavix, coumadin, jantoven, warfarin, heparin, pradaxa or dabigatran )? No    If hold needed, do NOT schedule, route to RN pool     Is patient taking any aspirin products? No     If more than 325mg/day do NOT schedule; route to RN pool     For CERVICAL procedures, hold all aspirin products for 6 days.      Does the patient have a bleeding or clotting disorder? No    If YES, okay to schedule AND route to RN nurse pool    **For any patients with platelet count <100, must be forwarded to provider**    Is patient diabetic? No If YES, have them bring their glucometer.    Does patient have an active infection or treated for one within the past week? No    Is patient currently taking any antibiotics?  No    For patients on chronic, preventative, or prophylacti antibiotics, procedures can be scheduled.     For patients on antibiotics for active or recent infection:    Jennifer Yu, Waldemar Zmaora Burton, Snitzer-antibiotic course must have been  completed for 4 days     Dr. Amato-antibiotic course must have been completed for 7 days    Is patient currently taking any steroid medications? (i.e. Prednisone, Medrol)  No     For patients on steroid medications:    Waldemar Nicole Burton, Snitzer-steroid course must have been completed for 4 days    Dr. Amato-steroid course must have been completed for 7 days    Review with patient:  If you are started on any steroids or antibiotics between now and your appointment, you must contact us because it may affect our ability to perform your procedure     Is patient actively being treated for cancer or immunocompromised? No   If YES, do NOT schedule and route to RN    Are you able to get on and off an exam table with minimal or no assistance? Yes   If NO, do NOT schedule and route to RN    Are you able to roll over and lay on your stomach with minimal or no assistance? Yes   If NO, do NOT schedule and route to RN    Any allergies to contrast dye, iodine, shellfish, or numbing and steroid medications? No  (If so, inform nursing and note in scheduling comments.)    Allergies: No clinical screening - see comments     Has the patient had a flu shot or any other vaccinations within 7 days before or after the procedure.  No     Does patient have an MRI/CT?  Not Applicable  (SI joint, hip injections, lumbar sympathetic blocks, and stellate ganglion blocks do not require an MRI)    Was the MRI done w/in the last 3 years?  NA    Was MRI done at [place]? Yes      If not, where was it done? N/A       If MRI was not done at [place], [facility] do NOT schedule and route to nursing.  If pt has an imaging disc, the injection may be scheduled but pt has to bring disc to appt. If they show up w/out disc the injection cannot be done    **Must be scheduled with elapsed time interval of at least 2 weeks and not more than 6 months between the First MBB and the Second MBB**       Medial Branch Block Pre-Procedure  Instructions    It is okay to take long acting pain medications (if you are on them) the day of the procedure but try not to take any short acting medications unless absolutely necessary.         Long acting meds would include: Gabapentin (Neurontin), MS Contin, Oxycontin        Short acting meds would include:  Percocet, Oxycodone, Vicodin, Ibuprofen     The day of the procedure, you should try to do things that provoke your pain, since the injection is being done to see if it will relieve your pain .     If your pain level is a 4 out of 10 or less on the day of the procedure, please call [phone] to reschedule.    Reminders (please tell patient if applicable):      Instructed pt to arrive 30 minutes early for IV start if this is for a cervical procedure, ALL sympathetic (stellate ganglion, hypogastric, or lumbar sympathetic block) and all sedation procedures (RFA, spinal cord stimulation trials).         -IVs are not routinely placed for Dr. Juarez cervical cases        -Dr. Gilbert: no IV needed for CMBB       If NPO for sedation, it is okay to take medications with sips of water (except if they are to hold blood thinners).    *DO take blood pressure medication if it is prescribed*      If this is for a cervical MBB aspirin needs to be held for 6 days.        Do not schedule procedures requiring IV placement in the first appointment after lunch       For patients 85 or older we recommend having an adult stay w/ them for the remainder of the day.      Does the patient have any questions? No      Aydee SCHWARTZ    [facility]

## 2018-01-30 ENCOUNTER — RADIANT APPOINTMENT (OUTPATIENT)
Dept: GENERAL RADIOLOGY | Facility: CLINIC | Age: 38
End: 2018-01-30
Attending: PAIN MEDICINE
Payer: COMMERCIAL

## 2018-01-30 ENCOUNTER — RADIOLOGY INJECTION OFFICE VISIT (OUTPATIENT)
Dept: PALLIATIVE MEDICINE | Facility: CLINIC | Age: 38
End: 2018-01-30
Attending: FAMILY MEDICINE
Payer: COMMERCIAL

## 2018-01-30 VITALS — DIASTOLIC BLOOD PRESSURE: 89 MMHG | OXYGEN SATURATION: 97 % | SYSTOLIC BLOOD PRESSURE: 135 MMHG | HEART RATE: 72 BPM

## 2018-01-30 DIAGNOSIS — M54.50 CHRONIC LOW BACK PAIN WITHOUT SCIATICA, UNSPECIFIED BACK PAIN LATERALITY: ICD-10-CM

## 2018-01-30 DIAGNOSIS — G89.29 CHRONIC LOW BACK PAIN WITHOUT SCIATICA, UNSPECIFIED BACK PAIN LATERALITY: ICD-10-CM

## 2018-01-30 DIAGNOSIS — M47.817 LUMBOSACRAL SPONDYLOSIS WITHOUT MYELOPATHY: Primary | ICD-10-CM

## 2018-01-30 PROCEDURE — 64494 INJ PARAVERT F JNT L/S 2 LEV: CPT | Mod: 50 | Performed by: PAIN MEDICINE

## 2018-01-30 PROCEDURE — 64493 INJ PARAVERT F JNT L/S 1 LEV: CPT | Mod: 50 | Performed by: PAIN MEDICINE

## 2018-01-30 NOTE — NURSING NOTE
Pre-procedure Intake    Have you been fasting? NA    If yes, for how long?     Are you taking a prescribed blood thinner such as coumadin, Plavix, Xarelto?    No    If yes, when did you take your last dose?     Do you take aspirin?  No    If cervical procedure, have you held aspirin for 6 days?   NA    Do you have any allergies to contrast dye, iodine, steroid and/or numbing medications?  NO    Are you currently taking antibiotics or have an active infection?  NO    Have you had a fever/elevated temperature within the past week? NO    Are you currently taking oral steroids? NO    Do you have a ? Yes       Are you pregnant or breastfeeding?  Not Applicable    Are the vital signs normal?  Yes

## 2018-01-30 NOTE — MR AVS SNAPSHOT
After Visit Summary   1/30/2018    Kimo Greenwood    MRN: [id number]           Patient Information     Date Of Birth          1980        Visit Information        Provider Department      1/30/2018 8:15 AM Roby Gilbert MD [facility]        Care Instructions    [facility]   Medial Branch Block Discharge Instructions  Nurse line:  [phone]  Appointment line:  [phone]    Your procedure was performed by: Dr. Roby Gilbert    Medications used: lidocaine, bupivacaine    You will need to complete the Pain Scale Log form and return it to us as soon as possible.  Once we have received the form, we will review it and call you to determine the next steps.     The form can be faxed to [phone] or mailed to:   [facility] - [facility]    [id number] [place] [address], [facility] [address], [place] [address]      You may resume your regular activity after the injection.    Be cautious with walking since you may have numbness and/or weakness in the lower extremities for up to 6-8 hours after the procedure due to the effects of the local anesthetic.    Avoid driving for 6 hours. The local anesthetic could slow your reflexes    You may shower, however no swimming or tub baths or hot tubs for 24 hours following your procedure.    Your pain will return after the numbing medications have worn off.  You may use your current pain medications as needed.    You may use anti-inflammatory medications (such as ibuprofen, aleve, or advil) or Tylenol for pain control if necessary.  Some people find it helpful to alternate ibuprofen and Tylenol every 3 hours for a couple of days.    You may use ice packs 10-15 minutes three to four times a day at the injection site for comfort.     Do not use heat to painful areas for 6 to 8 hours. This will give the local anesthetic time to wear off and prevent you from accidentally burning your  skin.     If you experience any of the following, call the pain center nursing line during work hours at [phone] or the on-call after hours physician line is [phone]:  -Fever over 100 degree F  -Swelling, bleeding, redness, drainage, warmth at the injection site  -Progressive weakness or numbness on your legs or arms  -If lumbar, call if you have a loss of bowel or bladder function  -If cervical, call if you have any unusual headache that is not relieved by Tylenol  -Unusual new onset of pain that is not improving                  Follow-ups after your visit        Your next 10 appointments already scheduled     Jan 31, 2018  7:20 AM CST   [facility]    [address] [facility] [address] [phone]              Who to contact     If you have questions or need follow up information about today's clinic visit or your schedule please contact [facility] directly at [phone].  Normal or non-critical lab and imaging results will be communicated to you by NuHabit[facility], letter or phone within 4 business days after the clinic has received the results. If you do not hear from us within 7 days, please contact the clinic through [facility] or phone. If you have a critical or abnormal lab result, we will notify you by phone as soon as possible.  Submit refill requests through [facility] or call your pharmacy and they will forward the refill request to us. Please allow 3 business days for your refill to be completed.          Additional Information About Your Visit        [facility] Information     [facility] gives you secure access to your electronic health record. If you see a primary care provider, you can also send messages to your care team and make appointments. If you have questions, please call your primary care clinic.  If you do not have a primary care provider, please call [phone] and they  will assist you.        Care EveryWhere ID     This is your Care EveryWhere ID. This could be used by other organizations to access your [facility] medical records  [id number]        Your Vitals Were     Pulse Pulse Oximetry                98 96%           Blood Pressure from Last 3 Encounters:   01/30/18 132/83   12/11/17 130/72   05/08/17 132/78    Weight from Last 3 Encounters:   12/11/17 (!) 158.9 kg (350 lb 6.4 oz)   05/08/17 (!) 151 kg (333 lb)   04/13/17 (!) 151.2 kg (333 lb 6.4 oz)              Today, you had the following     No orders found for display       Primary Care Provider Office Phone # Fax #    Kory Hudson [phone] [address]  [facility] [address]        Equal Access to Services     [facility]: Hadii aad ku hadasho Soomaali, waaxda luqadaha, qaybta kaalmada adeegyada, paola chavez haydavin mclain . So [facility] [phone].    ATENCIÓN: Si habla español, tiene a meng disposición servicios gratuitos de asistencia lingüística. Llame al [phone].    We comply with applicable federal civil rights laws and [place] laws. We do not discriminate on the basis of race, color, national origin, age, disability, sex, sexual orientation, or gender identity.            Thank you!     Thank you for choosing [facility]  for your care. Our goal is always to provide you with excellent care. Hearing back from our patients is one way we can continue to improve our services. Please take a few minutes to complete the written survey that you may receive in the mail after your visit with us. Thank you!             Your Updated Medication List - Protect others around you: Learn how to safely use, store and throw away your medicines at www.disposemymeds.org.          This list is accurate as of 1/30/18  8:26 AM.  Always use your most recent med list.                   Brand Name Dispense Instructions for use Diagnosis    ARIPiprazole 5 MG tablet    ABILIFY     30 tablet    Take 1 tablet (5 mg) by mouth every morning    Major depressive disorder, recurrent episode, moderate (H)       aspirin  MG EC tablet     70 tablet    Take one Aspirin tab twice daily for 5 weeks.    Status post total replacement of left hip       cholecalciferol 2000 UNITS Caps     30 capsule    Take 1 capsule by mouth daily    Vitamin D deficiency       diclofenac 1 % Gel topical gel    VOLTAREN    100 g    Place 2-4 g onto the skin 4 times daily as needed for moderate pain    Chronic lumbosacral pain, Knee pain, unspecified chronicity, unspecified laterality       FLUoxetine 20 MG capsule    PROzac    30 capsule    Take 1 capsule (20 mg) by mouth every morning    Major depressive disorder, recurrent episode, moderate (H)       fluticasone 50 MCG/ACT spray    FLONASE     Spray 2 sprays into both nostrils every morning        ketoconazole 2 % shampoo    NIZORAL    120 mL    Apply to the scalp and beard two times per week and wash off after 5 minutes.    Seborrheic dermatitis       lisinopril 10 MG tablet    PRINIVIL/ZESTRIL    90 tablet    Take 1 tablet (10 mg) by mouth every morning    Hypertension goal BP (blood pressure) < 140/90       mometasone-formoterol 100-5 MCG/ACT oral inhaler    DULERA    1 Inhaler    Inhale 2 puffs into the lungs 2 times daily    Mild intermittent asthma without complication       ondansetron 4 MG ODT tab    ZOFRAN ODT    15 tablet    Take 1 tablet (4 mg) by mouth every 8 hours as needed for nausea        * order for DME     1 each    auto CPAP 7-15 cm/h20    Obstructive sleep apnea (adult) (pediatric)       * order for DME     1 Units    Equipment being ordered: CPAP mask and tubing    LOREN (obstructive sleep apnea)       pantoprazole 40 MG EC tablet    PROTONIX    90 tablet    Take 1 tablet (40 mg) by mouth daily Take 30-60 minutes before first meal of the day    Duodenitis, LPRD (laryngopharyngeal reflux disease)       psyllium 0.52 G capsule     180 capsule     Take 1 capsule (0.52 g) by mouth daily    Slow transit constipation       VENTOLIN  (90 BASE) MCG/ACT Inhaler   Generic drug:  albuterol     1 Inhaler    INHALE 2 PUFFS INTO LUNGS EVERY SIX HOURS AS NEEDED FOR SHORTNESS OF BREATH / DYSPNEA OR WHEEZING    Mild intermittent asthma without complication       * Notice:  This list has 2 medication(s) that are the same as other medications prescribed for you. Read the directions carefully, and ask your doctor or other care provider to review them with you.

## 2018-01-30 NOTE — PROGRESS NOTES
Pre procedure Diagnosis: facet arthropathy, lumbosacral spondylosis   Post procedure Diagnosis: Same  Procedure performed: bilateral lumbar l4-S1 medial branch block  Indication:  Diagnostic   Anesthesia: none  Complications: none  Operators: Roby Gilbert MD   Indications:   Kimo Greenwood is a pleasant, 36-year-old male with axial LBP. He describes and aching and nagging pain of the low back,  worse with standing or sitting for too long, and improved with laying down. Exam shows +++ and pain with extension/rotation, and they have tried conservative treatment including PHYSICAL THERAPY  and meds.  Of note pt had moderate relief with lumbar epidural in the past.  Options/alternatives, benefits and risks were discussed with the patient including but not limited to bleeding, infection, tissue trauma, exposure to radiation, reaction to medications, spinal cord injury, weakness, numbness and paralysis.  Questions were answered to her satisfaction and she agrees to proceed. Voluntary informed consent was obtained and signed.     Vitals were reviewed: Yes  Allergies were reviewed:  Yes   Medications were reviewed:  Yes   Pre-procedure pain score: 4/10    Procedure:  After obtaining signed informed consent, the patient was brought into the procedure suite and was placed in a prone position on the procedure table.   A Pause for the Cause was performed.  The patient was prepped and draped in the usual sterile fashion.     Under AP fluoroscopic guidance the L3, L4, L5 vertebral bodies were identified. The C-arm was rotated to the oblique view to afford optimal visualization the pedicles.  Lidocaine 1% was used to anesthetize the skin at each level.  Under intermittent fluoroscopy, 25G 3.5inch spinal needles were positioned inferior and lateral to the intersection of the transverse process and pedicle at the Bilateral L4 & L5 levels and sacral ala. The needle positions were verified and optimized from the AP  view.    The anatomic targets for the L3 & L4 medial nerve and L5 dorsal ramus (which functionally incorporates the medial branch) were the  L4 & L5 transverse processes and sacral alar notch, with laterality as described above, resulting in blockade of the L4/5 and L5/S1 facet joints.    Bupivacaine 0.25% 1 ml was injected at each location.  The needles were removed.      Hemostasis was achieved, the area was cleaned, and bandaids were placed when appropriate.  The patient tolerated the procedure well, and was taken to the recovery room.    Images were saved to PACS.     The patient will continue to monitor progress, and they were given a pain diary to complete at home.  They will either fax or mail this back to us or bring it to their next appointment. We will determine the treatment plan after we review the diary.      Post-procedure pain score: 3/10  Follow-up includes:   -f/u phone call in one week  -will await diary for further planning.    Roby Gilbert MD  [facility]

## 2018-01-30 NOTE — PATIENT INSTRUCTIONS
[facility]   Medial Branch Block Discharge Instructions  Nurse line:  [phone]  Appointment line:  [phone]    Your procedure was performed by: Dr. Roby Gilbert    Medications used: lidocaine, bupivacaine    You will need to complete the Pain Scale Log form and return it to us as soon as possible.  Once we have received the form, we will review it and call you to determine the next steps.     The form can be faxed to [phone] or mailed to:   [facility] - [facility]    [address] [facility] [address] [address]      You may resume your regular activity after the injection.    Be cautious with walking since you may have numbness and/or weakness in the lower extremities for up to 6-8 hours after the procedure due to the effects of the local anesthetic.    Avoid driving for 6 hours. The local anesthetic could slow your reflexes    You may shower, however no swimming or tub baths or hot tubs for 24 hours following your procedure.    Your pain will return after the numbing medications have worn off.  You may use your current pain medications as needed.    You may use anti-inflammatory medications (such as ibuprofen, aleve, or advil) or Tylenol for pain control if necessary.  Some people find it helpful to alternate ibuprofen and Tylenol every 3 hours for a couple of days.    You may use ice packs 10-15 minutes three to four times a day at the injection site for comfort.     Do not use heat to painful areas for 6 to 8 hours. This will give the local anesthetic time to wear off and prevent you from accidentally burning your skin.     If you experience any of the following, call the pain center nursing line during work hours at [phone] or the on-call after hours physician line is [phone]:  -Fever over 100 degree F  -Swelling, bleeding, redness, drainage, warmth at the injection site  -Progressive weakness or numbness on your  legs or arms  -If lumbar, call if you have a loss of bowel or bladder function  -If cervical, call if you have any unusual headache that is not relieved by Tylenol  -Unusual new onset of pain that is not improving

## 2018-01-30 NOTE — NURSING NOTE
Discharge Information    IV Discontiued Time:  NA    Amount of Fluid Infused:  NA    Discharge Criteria = When patient returns to baseline or as per MD order    Consciousness:  Pt is fully awake    Circulation:  BP +/- 20% of pre-procedure level    Respiration:  Patient is able to breathe deeply    O2 Sat:  Patient is able to maintain O2 Sat >92% on room air    Activity:  Moves 4 extremities on command    Ambulation:  Patient is able to stand and walk or stand and pivot into wheelchair    Dressing:  Clean/dry or No Dressing    Notes:   Discharge instructions and AVS given to patient    Patient meets criteria for discharge?  YES    Admitted to PCU?  No    Responsible adult present to accompany patient home?  Yes    Signature/Title:    Chelsi Kitchen  RN Care Coordinator  [facility]

## 2018-01-31 ENCOUNTER — OFFICE VISIT (OUTPATIENT)
Dept: FAMILY MEDICINE | Facility: CLINIC | Age: 38
End: 2018-01-31
Payer: COMMERCIAL

## 2018-01-31 VITALS
TEMPERATURE: 98.4 F | WEIGHT: 315 LBS | HEIGHT: 71 IN | DIASTOLIC BLOOD PRESSURE: 82 MMHG | SYSTOLIC BLOOD PRESSURE: 126 MMHG | BODY MASS INDEX: 44.1 KG/M2 | OXYGEN SATURATION: 97 % | HEART RATE: 81 BPM

## 2018-01-31 DIAGNOSIS — G89.29 CHRONIC LOW BACK PAIN WITHOUT SCIATICA, UNSPECIFIED BACK PAIN LATERALITY: ICD-10-CM

## 2018-01-31 DIAGNOSIS — F32.1 MODERATE MAJOR DEPRESSION (H): ICD-10-CM

## 2018-01-31 DIAGNOSIS — M54.50 CHRONIC LOW BACK PAIN WITHOUT SCIATICA, UNSPECIFIED BACK PAIN LATERALITY: ICD-10-CM

## 2018-01-31 DIAGNOSIS — Z00.00 ROUTINE GENERAL MEDICAL EXAMINATION AT A HEALTH CARE FACILITY: Primary | ICD-10-CM

## 2018-01-31 DIAGNOSIS — Z11.1 SCREENING EXAMINATION FOR PULMONARY TUBERCULOSIS: ICD-10-CM

## 2018-01-31 DIAGNOSIS — E66.01 MORBID OBESITY (H): ICD-10-CM

## 2018-01-31 DIAGNOSIS — J45.20 MILD INTERMITTENT ASTHMA WITHOUT COMPLICATION: ICD-10-CM

## 2018-01-31 DIAGNOSIS — I10 HYPERTENSION GOAL BP (BLOOD PRESSURE) < 140/90: ICD-10-CM

## 2018-01-31 PROCEDURE — 99395 PREV VISIT EST AGE 18-39: CPT | Performed by: FAMILY MEDICINE

## 2018-01-31 PROCEDURE — 86580 TB INTRADERMAL TEST: CPT | Performed by: FAMILY MEDICINE

## 2018-01-31 RX ORDER — FLUOXETINE 40 MG/1
40 CAPSULE ORAL DAILY
Qty: 30 CAPSULE | Refills: 11 | Status: ON HOLD | OUTPATIENT
Start: 2018-01-31 | End: 2018-06-08

## 2018-01-31 NOTE — NURSING NOTE
"Chief Complaint   Patient presents with     Physical       Initial /82 (BP Location: Right arm, Patient Position: Chair, Cuff Size: Adult Regular)  Pulse 81  Temp 98.4  F (36.9  C) (Oral)  Ht 5' 11\" (1.803 m)  Wt (!) 340 lb 11.2 oz (154.5 kg)  SpO2 97%  BMI 47.52 kg/m2 Estimated body mass index is 47.52 kg/(m^2) as calculated from the following:    Height as of this encounter: 5' 11\" (1.803 m).    Weight as of this encounter: 340 lb 11.2 oz (154.5 kg).    Medication Reconciliation: complete    Health Maintenance addressed:  NONE    n/a    Deepali Galvan CMA                         "

## 2018-01-31 NOTE — MR AVS SNAPSHOT
After Visit Summary   1/31/2018    Kimo Greenwood    MRN: [id number]           Patient Information     Date Of Birth          1980        Visit Information        Provider Department      1/31/2018 7:20 AM Kory Hudson MD [facility]        Today's Diagnoses     Routine general medical examination at a health care facility    -  1    Moderate major depression (H)          Care Instructions    Increase prozac to 40 mg daily - sent new prescription to [place] - can increase when it is delivered            Follow-ups after your visit        Who to contact     If you have questions or need follow up information about today's clinic visit or your schedule please contact [facility] directly at [phone].  Normal or non-critical lab and imaging results will be communicated to you by MyChart, letter or phone within 4 business days after the clinic has received the results. If you do not hear from us within 7 days, please contact the clinic through [employer] or phone. If you have a critical or abnormal lab result, we will notify you by phone as soon as possible.  Submit refill requests through [employer] or call your pharmacy and they will forward the refill request to us. Please allow 3 business days for your refill to be completed.          Additional Information About Your Visit        MyChart Information     [employer] gives you secure access to your electronic health record. If you see a primary care provider, you can also send messages to your care team and make appointments. If you have questions, please call your primary care clinic.  If you do not have a primary care provider, please call [phone] and they will assist you.        Care EveryWhere ID     This is your Care EveryWhere ID. This could be used by other organizations to access your [facility] records  [id number]        Your Vitals Were     Pulse Temperature Height Pulse Oximetry BMI (Body Mass  "Index)       81 98.4  F (36.9  C) (Oral) 5' 11\" (1.803 m) 97% 47.52 kg/m2        Blood Pressure from Last 3 Encounters:   01/31/18 126/82   01/30/18 135/89   12/11/17 130/72    Weight from Last 3 Encounters:   01/31/18 (!) 340 lb 11.2 oz (154.5 kg)   12/11/17 (!) 350 lb 6.4 oz (158.9 kg)   05/08/17 (!) 333 lb (151 kg)              Today, you had the following     No orders found for display         Today's Medication Changes          These changes are accurate as of 1/31/18  8:08 AM.  If you have any questions, ask your nurse or doctor.               These medicines have changed or have updated prescriptions.        Dose/Directions    FLUoxetine 40 MG capsule   Commonly known as:  PROzac   This may have changed:    - medication strength  - how much to take  - when to take this   Used for:  Moderate major depression (H)   Changed by:  Kory Hudson MD        Dose:  40 mg   Take 1 capsule (40 mg) by mouth daily   Quantity:  30 capsule   Refills:  11            Where to get your medicines      These medications were sent to [facility]  [address] [address], [facility] [address]     Phone:  [phone]     FLUoxetine 40 MG capsule                Primary Care Provider Office Phone # Fax #    Kory Hudson [phone] [address] [facility] [address]        Equal Access to Services     [facility]: Hadii javier ku hadasho Soomaali, waaxda luqadaha, qaybta kaalmada adeegyada, waxstephen dumont. So [facility] [phone].    ATENCIÓN: Si habla marshaañol, tiene a meng disposición servicios gratuitos de asistencia lingüística. Llame al [phone].    We comply with applicable federal civil rights laws and [place] laws. We do not discriminate on the basis of race, color, national origin, age, disability, sex, sexual orientation, or gender identity.            Thank you!     Thank you for choosing " [facility]  for your care. Our goal is always to provide you with excellent care. Hearing back from our patients is one way we can continue to improve our services. Please take a few minutes to complete the written survey that you may receive in the mail after your visit with us. Thank you!             Your Updated Medication List - Protect others around you: Learn how to safely use, store and throw away your medicines at www.disposemymeds.org.          This list is accurate as of 1/31/18  8:08 AM.  Always use your most recent med list.                   Brand Name Dispense Instructions for use Diagnosis    ARIPiprazole 5 MG tablet    ABILIFY    30 tablet    Take 1 tablet (5 mg) by mouth every morning    Major depressive disorder, recurrent episode, moderate (H)       aspirin  MG EC tablet     70 tablet    Take one Aspirin tab twice daily for 5 weeks.    Status post total replacement of left hip       cholecalciferol 2000 UNITS Caps     30 capsule    Take 1 capsule by mouth daily    Vitamin D deficiency       diclofenac 1 % Gel topical gel    VOLTAREN    100 g    Place 2-4 g onto the skin 4 times daily as needed for moderate pain    Chronic lumbosacral pain, Knee pain, unspecified chronicity, unspecified laterality       FLUoxetine 40 MG capsule    PROzac    30 capsule    Take 1 capsule (40 mg) by mouth daily    Moderate major depression (H)       fluticasone 50 MCG/ACT spray    FLONASE     Spray 2 sprays into both nostrils every morning        ketoconazole 2 % shampoo    NIZORAL    120 mL    Apply to the scalp and beard two times per week and wash off after 5 minutes.    Seborrheic dermatitis       lisinopril 10 MG tablet    PRINIVIL/ZESTRIL    90 tablet    Take 1 tablet (10 mg) by mouth every morning    Hypertension goal BP (blood pressure) < 140/90       mometasone-formoterol 100-5 MCG/ACT oral inhaler    DULERA    1 Inhaler    Inhale 2 puffs into the lungs 2 times daily    Mild intermittent  asthma without complication       ondansetron 4 MG ODT tab    ZOFRAN ODT    15 tablet    Take 1 tablet (4 mg) by mouth every 8 hours as needed for nausea        * order for DME     1 each    auto CPAP 7-15 cm/h20    Obstructive sleep apnea (adult) (pediatric)       * order for DME     1 Units    Equipment being ordered: CPAP mask and tubing    LOREN (obstructive sleep apnea)       pantoprazole 40 MG EC tablet    PROTONIX    90 tablet    Take 1 tablet (40 mg) by mouth daily Take 30-60 minutes before first meal of the day    Duodenitis, LPRD (laryngopharyngeal reflux disease)       psyllium 0.52 G capsule     180 capsule    Take 1 capsule (0.52 g) by mouth daily    Slow transit constipation       VENTOLIN  (90 BASE) MCG/ACT Inhaler   Generic drug:  albuterol     1 Inhaler    INHALE 2 PUFFS INTO LUNGS EVERY SIX HOURS AS NEEDED FOR SHORTNESS OF BREATH / DYSPNEA OR WHEEZING    Mild intermittent asthma without complication       * Notice:  This list has 2 medication(s) that are the same as other medications prescribed for you. Read the directions carefully, and ask your doctor or other care provider to review them with you.

## 2018-01-31 NOTE — PATIENT INSTRUCTIONS
Increase prozac to 40 mg daily - sent new prescription to [place] - can increase when it is delivered

## 2018-01-31 NOTE — PROGRESS NOTES
SUBJECTIVE:   CC: Kimo Greenwood is an 37 year old male who presents for preventative health visit.     Physical   Annual:     Getting at least 3 servings of Calcium per day::  Yes    Bi-annual eye exam::  Yes    Dental care twice a year::  Yes    Sleep apnea or symptoms of sleep apnea::  None    Diet::  Regular (no restrictions)    Taking medications regularly::  Yes    Medication side effects::  Not applicable    Additional concerns today::  No          Patient with history of chronic low back pain. Yesterday, he had a bilateral lumbar l4-S1 medial branch block. He reports low back pain. Patient took naproxen to relieve the pain.     Patient with history of depression. He continues to feel sad/down. Denies suicidal ideations.     Today's PHQ-2 Score:   PHQ-2 ( 1999 [employer]) 1/31/2018   Q1: Little interest or pleasure in doing things 1   Q2: Feeling down, depressed or hopeless 0   PHQ-2 Score 1   Q1: Little interest or pleasure in doing things Several days   Q2: Feeling down, depressed or hopeless Not at all   PHQ-2 Score 1       Abuse: Current or Past(Physical, Sexual or Emotional)- No  Do you feel safe in your environment - Yes    Social History   Substance Use Topics     Smoking status: Heavy Tobacco Smoker     Packs/day: 0.50     Years: 1.00     Types: Cigarettes     Smokeless tobacco: Never Used      Comment: Smokes E Cigs equal to 1 PPD     Alcohol use Yes      Comment: socially     Alcohol Use 1/31/2018   If you drink alcohol, do you typically have greater than 3 drinks per day OR greater than 7 drinks per week?   No       Last PSA: No results found for: PSA    Reviewed orders with patient. Reviewed health maintenance and updated orders accordingly - Yes  Patient Active Problem List   Diagnosis     Speech/language delay     Tobacco use disorder     Nocturnal enuresis     Moderate major depression (H)     LOREN (obstructive sleep apnea)     Insomnia     Hypertension goal BP (blood pressure) < 140/90      Morbid obesity (H)     Leukocytosis     Suicidal ideation     Chronic low back pain     Bilateral low back pain with left-sided sciatica     History of colonic polyps     Mild intellectual disability     S/P total hip arthroplasty     Vitamin D deficiency     LPRD (laryngopharyngeal reflux disease)     Patel's esophagus determined by biopsy     Mild intermittent asthma     Past Surgical History:   Procedure Laterality Date     ARTHROPLASTY HIP Left 1/25/2017    Procedure: ARTHROPLASTY HIP;  Surgeon: Bala Randall MD;  Location: [facility] OR     COLONOSCOPY       COLONOSCOPY N/A 10/30/2015    Procedure: COMBINED COLONOSCOPY, SINGLE OR MULTIPLE BIOPSY/POLYPECTOMY BY BIOPSY;  Surgeon: Alexis Jackson MD;  Location: [facility] GI     COLONOSCOPY N/A 11/17/2016    Procedure: COMBINED COLONOSCOPY, SINGLE OR MULTIPLE BIOPSY/POLYPECTOMY BY BIOPSY;  Surgeon: Cat Huber MD;  Location: [facility]     ESOPHAGOSCOPY, GASTROSCOPY, DUODENOSCOPY (EGD), COMBINED N/A 11/17/2016    Procedure: COMBINED ESOPHAGOSCOPY, GASTROSCOPY, DUODENOSCOPY (EGD), BIOPSY SINGLE OR MULTIPLE;  Surgeon: Cat Huber MD;  [facility]     EXAM UNDER ANESTHESIA, FULGURATE CONDYLOMA ANUS, COMBINED N/A 12/22/2016    Procedure: COMBINED EXAM UNDER ANESTHESIA, FULGURATE CONDYLOMA ANUS;  Surgeon: Nya Cabello MD;  Location: [facility]     GENITOURINARY SURGERY      bladder, Ibarra, [facility],Interstem stimulator implant     ORTHOPEDIC SURGERY         Social History   Substance Use Topics     Smoking status: Heavy Tobacco Smoker     Packs/day: 0.50     Years: 1.00     Types: Cigarettes     Smokeless tobacco: Never Used      Comment: Smokes E Cigs equal to 1 PPD     Alcohol use Yes      Comment: socially     Family History   Problem Relation Age of Onset     Anxiety Disorder Mother      Depression Mother      Ulcerative Colitis Mother 18     Breast Cancer Maternal Grandmother      CEREBROVASCULAR DISEASE Maternal  Grandmother      Breast Cancer Maternal Aunt      CANCER Maternal Grandfather      grt uncles colon cancer           Reviewed and updated as needed this visit by clinical staff  Tobacco  Allergies  Meds  Med Hx  Surg Hx  Fam Hx  Soc Hx        Reviewed and updated as needed this visit by Provider        Past Medical History:   Diagnosis Date     Hypertension      Leukocytosis 6/9/2014     Marijuana abuse      MDD (major depressive disorder)      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    With associated speech/language delay     Obesity 11/11/2010     LOREN (obstructive sleep apnea)      Other chronic pain     left hip/leg pain     Seborrheic dermatitis      Uncomplicated asthma       Past Surgical History:   Procedure Laterality Date     ARTHROPLASTY HIP Left 1/25/2017    Procedure: ARTHROPLASTY HIP;  Surgeon: Bala Randall MD;  Location: [facility]     COLONOSCOPY       COLONOSCOPY N/A 10/30/2015    Procedure: COMBINED COLONOSCOPY, SINGLE OR MULTIPLE BIOPSY/POLYPECTOMY BY BIOPSY;  Surgeon: Alexis Jackson MD;  Location: [facility]     COLONOSCOPY N/A 11/17/2016    Procedure: COMBINED COLONOSCOPY, SINGLE OR MULTIPLE BIOPSY/POLYPECTOMY BY BIOPSY;  Surgeon: Cat Huber MD;  [facility]     ESOPHAGOSCOPY, GASTROSCOPY, DUODENOSCOPY (EGD), COMBINED N/A 11/17/2016    Procedure: COMBINED ESOPHAGOSCOPY, GASTROSCOPY, DUODENOSCOPY (EGD), BIOPSY SINGLE OR MULTIPLE;  Surgeon: Cat Huber MD;  [facility]     EXAM UNDER ANESTHESIA, FULGURATE CONDYLOMA ANUS, COMBINED N/A 12/22/2016    Procedure: COMBINED EXAM UNDER ANESTHESIA, FULGURATE CONDYLOMA ANUS;  Surgeon: Nya Cabello MD;  [facility]     GENITOURINARY SURGERY      bladder, Ibarra, [facility],Interstem stimulator implant     ORTHOPEDIC SURGERY         Review of Systems  C: NEGATIVE for fever, chills, change in weight  I: NEGATIVE for worrisome rashes, moles or lesions  E: NEGATIVE for vision changes  "or irritation  ENT: NEGATIVE for ear, mouth and throat problems  R: NEGATIVE for significant cough or SOB  CV: NEGATIVE for chest pain, palpitations or peripheral edema  GI: NEGATIVE for nausea, abdominal pain, heartburn, or change in bowel habits   male: negative for dysuria, hematuria, decreased urinary stream, erectile dysfunction, urethral discharge  M: as above   N: NEGATIVE for weakness, dizziness or paresthesias  P: as above     This document serves as a record of the services and decisions personally performed and made by Kory Hudson MD. It was created on his behalf by Jacklyn Orellana, a trained medical scribe. The creation of this document is based on the provider's statements to the medical scribe.  Jacklyn Orellana 7:41 AM January 31, 2018    OBJECTIVE:   /82 (BP Location: Right arm, Patient Position: Chair, Cuff Size: Adult Regular)  Pulse 81  Temp 98.4  F (36.9  C) (Oral)  Ht 1.803 m (5' 11\")  Wt (!) 154.5 kg (340 lb 11.2 oz)  SpO2 97%  BMI 47.52 kg/m2    Physical Exam  GENERAL: healthy, alert and no distress  EYES: Eyes grossly normal to inspection, PERRL and conjunctivae and sclerae normal  HENT: ear canals and TM's normal, nose and mouth without ulcers or lesions  NECK: no adenopathy, no asymmetry, masses, or scars and thyroid normal to palpation  RESP: lungs clear to auscultation - no rales, rhonchi or wheezes  CV: regular rate and rhythm, normal S1 S2, no S3 or S4, no murmur, click or rub, no peripheral edema and peripheral pulses strong  ABDOMEN: soft, nontender, no hepatosplenomegaly, no masses and bowel sounds normal   (male): normal male genitalia without lesions or urethral discharge, no hernia  MS: no gross musculoskeletal defects noted, no edema  SKIN: no suspicious lesions or rashes  NEURO: Normal strength and tone, mentation intact and speech normal  PSYCH: mentation appears normal, affect normal/bright    ASSESSMENT/PLAN:   1. Routine general medical examination at a [facility]" "care facility    2. Moderate major depression (H)  - FLUoxetine (PROZAC) 40 MG capsule; Take 1 capsule (40 mg) by mouth daily  Dispense: 30 capsule; Refill: 11    COUNSELING:   Reviewed preventive health counseling, as reflected in patient instructions         reports that he has been smoking Cigarettes.  He has a 0.50 pack-year smoking history. He has never used smokeless tobacco.  Tobacco Cessation Action Plan: [facility]   Estimated body mass index is 47.52 kg/(m^2) as calculated from the following:    Height as of this encounter: 1.803 m (5' 11\").    Weight as of this encounter: 154.5 kg (340 lb 11.2 oz).   Weight management plan: Discussed healthy diet and exercise guidelines and patient will follow up in 12 months in clinic to re-evaluate.    Counseling Resources:  ATP IV Guidelines  Pooled Cohorts Equation Calculator  FRAX Risk Assessment  ICSI Preventive Guidelines  Dietary Guidelines for Americans, 2010  USDA's MyPlate  ASA Prophylaxis  Lung CA Screening    The information in this document, created by the medical scribe for me, accurately reflects the services I personally performed and the decisions made by me. I have reviewed and approved this document for accuracy prior to leaving the patient care area.  January 31, 2018 8:10 AM    Kory Hudson MD  [facility]  Answers for HPI/ROS submitted by the patient on 1/31/2018   PHQ-2 Score: 1    "

## 2018-02-01 ENCOUNTER — TELEPHONE (OUTPATIENT)
Dept: FAMILY MEDICINE | Facility: CLINIC | Age: 38
End: 2018-02-01

## 2018-02-01 NOTE — TELEPHONE ENCOUNTER
Received form from patient, form is completed just waiting for mantoux results to come back. Once completed please fax to [phone]. Form is located in Form folder at the [place] [facility]. Fatou Pantoja

## 2018-02-05 ENCOUNTER — ALLIED HEALTH/NURSE VISIT (OUTPATIENT)
Dept: NURSING | Facility: CLINIC | Age: 38
End: 2018-02-05
Payer: COMMERCIAL

## 2018-02-05 ENCOUNTER — TRANSFERRED RECORDS (OUTPATIENT)
Dept: HEALTH INFORMATION MANAGEMENT | Facility: CLINIC | Age: 38
End: 2018-02-05

## 2018-02-05 ENCOUNTER — CARE COORDINATION (OUTPATIENT)
Dept: CARE COORDINATION | Facility: CLINIC | Age: 38
End: 2018-02-05

## 2018-02-05 DIAGNOSIS — Z11.1 SCREENING EXAMINATION FOR PULMONARY TUBERCULOSIS: Primary | ICD-10-CM

## 2018-02-05 PROCEDURE — 86580 TB INTRADERMAL TEST: CPT

## 2018-02-05 PROCEDURE — 99207 ZZC NO CHARGE NURSE ONLY: CPT

## 2018-02-05 NOTE — LETTER
February 21, 2018      Kelly Greenwood  [address] [place] [address]        Dear Bora ,    I am the Social Work Care Coordinator that works with your primary care provider's clinic. I recently tried to call and was unable to reach you. Below is a description of what Clinic Care Coordination is and how I can further assist you.     My role as the care coordinator  is to help your manage your health and improve access to the [facility] system in the most efficient manner.I can assist you with community referrals for various resources (Housing, transportation, employment, financial resources, health insurance), developing goals to address behavioral and psychosocial needs, and connecting you with chemical dependency and mental health services.    I am a free and available resource Monday through Friday here at the clinic. Feel free to save my contact information and outreach to me anytime at my direct number  [phone]. We at [facility] are focused on providing you with the highest-quality healthcare experience possible.     Sincerely,    AI Rivero, MSW  Social Work Care Coordinator  P:[phone]  [facility], [place], and [place]

## 2018-02-05 NOTE — PROGRESS NOTES
Clinic Care Coordination Contact  [facility]/Voicemail    Clinical Data: follow up on inpatient Tx. Per chart review mom appears to be requesting paperwork for [place] inpatient    Outreach attempted x 1.  Left message on voicemail with call back information and requested return call.  Plan: Care coordinator will try again in 1-2 weeks.    Danna Panda, Social Work Care Coordinator, [facility], [facility] Clinics: [place], [place], and [place]   P:[phone] / Signed February 5, 2018

## 2018-02-05 NOTE — TELEPHONE ENCOUNTER
Sent patient a [employer] message, according to our records he didn't have his mantoux test read. Waiting to hear back to see if he had this read outside of [facility]. Fatou Pantoja

## 2018-02-06 NOTE — TELEPHONE ENCOUNTER
Patient made a appointments to get the injection and to have it read on Thursday. Postpone encounter until Thursday. Fatou Pantoja

## 2018-02-07 ENCOUNTER — TELEPHONE (OUTPATIENT)
Dept: PALLIATIVE MEDICINE | Facility: CLINIC | Age: 38
End: 2018-02-07

## 2018-02-07 NOTE — TELEPHONE ENCOUNTER
Patient had a  bilateral lumbar l4-S1 medial branch block on 1/30/17.  Called patient for an update.      Left message that we were calling for an update about how he was doing after the injection.  LM that if he has any problems or questions to call the nurse line at [phone].

## 2018-02-08 ENCOUNTER — ALLIED HEALTH/NURSE VISIT (OUTPATIENT)
Dept: NURSING | Facility: CLINIC | Age: 38
End: 2018-02-08
Payer: COMMERCIAL

## 2018-02-08 DIAGNOSIS — Z11.1 SCREENING EXAMINATION FOR PULMONARY TUBERCULOSIS: Primary | ICD-10-CM

## 2018-02-08 LAB
PPDINDURATION: 0 MM (ref 0–5)
PPDREDNESS: 0 MM

## 2018-02-08 PROCEDURE — 99207 ZZC NO CHARGE NURSE ONLY: CPT

## 2018-02-08 NOTE — MR AVS SNAPSHOT
After Visit Summary   2/8/2018    Kimo Greenwood    MRN: [id number]           Patient Information     Date Of Birth          1980        Visit Information        Provider Department      2/8/2018 8:30 AM LV RN [facility]        Today's Diagnoses     Screening examination for pulmonary tuberculosis    -  1       Follow-ups after your visit        Who to contact     If you have questions or need follow up information about today's clinic visit or your schedule please contact [facility] directly at [phone].  Normal or non-critical lab and imaging results will be communicated to you by MyChart, letter or phone within 4 business days after the clinic has received the results. If you do not hear from us within 7 days, please contact the clinic through [employer] or phone. If you have a critical or abnormal lab result, we will notify you by phone as soon as possible.  Submit refill requests through [employer]na or call your pharmacy and they will forward the refill request to us. Please allow 3 business days for your refill to be completed.          Additional Information About Your Visit        MyChart Information     [employer] gives you secure access to your electronic health record. If you see a primary care provider, you can also send messages to your care team and make appointments. If you have questions, please call your primary care clinic.  If you do not have a primary care provider, please call [phone] and they will assist you.        Care EveryWhere ID     This is your Care EveryWhere ID. This could be used by other organizations to access your [facility] medical records  [id number]         Blood Pressure from Last 3 Encounters:   01/31/18 126/82   01/30/18 135/89   12/11/17 130/72    Weight from Last 3 Encounters:   01/31/18 (!) 340 lb 11.2 oz (154.5 kg)   12/11/17 (!) 350 lb 6.4 oz (158.9 kg)   05/08/17 (!) 333 lb (151 kg)              Today, you had the  following     No orders found for display       Primary Care Provider Office Phone # Fax #    Kory Hudson [phone] [address]  [facility] [address]        Equal Access to Services     JOSE CRUZ DEUTSCH : Hadkennedy javier ku tuano Soelíasali, waaxda luqadaha, qaybta kaalmada adeegyada, paola hamn marek tom chemo dumont. So [facility] [phone].    ATENCIÓN: Si habla español, tiene a meng disposición servicios gratuitos de asistencia lingüística. Llame al [phone].    We comply with applicable federal civil rights laws and [place] laws. We do not discriminate on the basis of race, color, national origin, age, disability, sex, sexual orientation, or gender identity.            Thank you!     Thank you for choosing [facility]  for your care. Our goal is always to provide you with excellent care. Hearing back from our patients is one way we can continue to improve our services. Please take a few minutes to complete the written survey that you may receive in the mail after your visit with us. Thank you!             Your Updated Medication List - Protect others around you: Learn how to safely use, store and throw away your medicines at www.disposemymeds.org.          This list is accurate as of 2/8/18  8:35 AM.  Always use your most recent med list.                   Brand Name Dispense Instructions for use Diagnosis    ARIPiprazole 5 MG tablet    ABILIFY    30 tablet    Take 1 tablet (5 mg) by mouth every morning    Major depressive disorder, recurrent episode, moderate (H)       aspirin  MG EC tablet     70 tablet    Take one Aspirin tab twice daily for 5 weeks.    Status post total replacement of left hip       cholecalciferol 2000 UNITS Caps     30 capsule    Take 1 capsule by mouth daily    Vitamin D deficiency       diclofenac 1 % Gel topical gel    VOLTAREN    100 g    Place 2-4 g onto the skin 4 times daily as needed for moderate pain    Chronic lumbosacral  pain, Knee pain, unspecified chronicity, unspecified laterality       FLUoxetine 40 MG capsule    PROzac    30 capsule    Take 1 capsule (40 mg) by mouth daily    Moderate major depression (H)       fluticasone 50 MCG/ACT spray    FLONASE     Spray 2 sprays into both nostrils every morning        ketoconazole 2 % shampoo    NIZORAL    120 mL    Apply to the scalp and beard two times per week and wash off after 5 minutes.    Seborrheic dermatitis       lisinopril 10 MG tablet    PRINIVIL/ZESTRIL    90 tablet    Take 1 tablet (10 mg) by mouth every morning    Hypertension goal BP (blood pressure) < 140/90       mometasone-formoterol 100-5 MCG/ACT oral inhaler    DULERA    1 Inhaler    Inhale 2 puffs into the lungs 2 times daily    Mild intermittent asthma without complication       ondansetron 4 MG ODT tab    ZOFRAN ODT    15 tablet    Take 1 tablet (4 mg) by mouth every 8 hours as needed for nausea        * order for DME     1 each    auto CPAP 7-15 cm/h20    Obstructive sleep apnea (adult) (pediatric)       * order for DME     1 Units    Equipment being ordered: CPAP mask and tubing    LOREN (obstructive sleep apnea)       pantoprazole 40 MG EC tablet    PROTONIX    90 tablet    Take 1 tablet (40 mg) by mouth daily Take 30-60 minutes before first meal of the day    Duodenitis, LPRD (laryngopharyngeal reflux disease)       psyllium 0.52 G capsule     180 capsule    Take 1 capsule (0.52 g) by mouth daily    Slow transit constipation       VENTOLIN  (90 BASE) MCG/ACT Inhaler   Generic drug:  albuterol     1 Inhaler    INHALE 2 PUFFS INTO LUNGS EVERY SIX HOURS AS NEEDED FOR SHORTNESS OF BREATH / DYSPNEA OR WHEEZING    Mild intermittent asthma without complication       * Notice:  This list has 2 medication(s) that are the same as other medications prescribed for you. Read the directions carefully, and ask your doctor or other care provider to review them with you.

## 2018-02-08 NOTE — TELEPHONE ENCOUNTER
Forms completed, fax form to [phone]. Sent a copy to bella and Zahra lauren. Fatou Pantoja

## 2018-02-13 ENCOUNTER — MYC MEDICAL ADVICE (OUTPATIENT)
Dept: FAMILY MEDICINE | Facility: CLINIC | Age: 38
End: 2018-02-13

## 2018-02-13 DIAGNOSIS — F32.1 MODERATE MAJOR DEPRESSION (H): ICD-10-CM

## 2018-02-13 DIAGNOSIS — N39.44 NOCTURNAL ENURESIS: Primary | ICD-10-CM

## 2018-02-21 NOTE — PROGRESS NOTES
Clinic Care Coordination Contact  [facility]/Voicemail    Clinical Data: see below    Outreach attempted x 2.  Left message on voicemail with call back information and requested return call.  Plan: Care coordinator will send [facility] letter and review in 30 days .    Danna Panda, Social Work Care Coordinator, [facility], [facility] Clinics: [place], [place], and [place]   P:[phone] / Signed February 21, 2018

## 2018-03-22 ENCOUNTER — TRANSFERRED RECORDS (OUTPATIENT)
Dept: HEALTH INFORMATION MANAGEMENT | Facility: CLINIC | Age: 38
End: 2018-03-22

## 2018-03-26 NOTE — PROGRESS NOTES
Clinic Care Coordination     Patient will be closed to care coordination at this time  RE: unable to contact    Danna Panda, Social Work Care Coordinator, [facility], MSW  P:[phone]  [facility] [place] [facility] [place]

## 2018-05-14 ENCOUNTER — TELEPHONE (OUTPATIENT)
Dept: FAMILY MEDICINE | Facility: CLINIC | Age: 38
End: 2018-05-14

## 2018-05-14 NOTE — TELEPHONE ENCOUNTER
PA Initiation    Medication: pantoprazole (PROTONIX) 40 MG EC tablet  Insurance Company: [employer] - Phone [phone] Fax [phone]  Pharmacy Filling the Rx: [place], [place] - [address] [place] [address]  Filling Pharmacy Phone: [phone]  Filling Pharmacy Fax:    Start Date: 5/14/2018    THIS HAS BEEN SUBMITTED BY THE PRIOR-AUTHORIZATION TEAM. ANY QUESTIONS PLEASE CALL [phone]. THANK YOU

## 2018-05-14 NOTE — TELEPHONE ENCOUNTER
Prior Authorization Retail Medication Request    Medication/Dose: pantoprazole (PROTONIX) 40 MG EC tablet  ICD code (if different than what is on RX):  LPRD (laryngopharyngeal reflux disease) [K21.9], Duodenitis [K29.80]   Previously Tried and Failed:  ranitidine (ZANTAC) tablet 150 mg   Rationale:  Patient has been on this medication since 01/25/2017.    Insurance Name:  [employer]  Insurance ID:  [id number]      Pharmacy Information (if different than what is on RX)  Name:  [facility] pharmacy  Phone:  [phone]  Fax: [phone]    Santiago AGUILAR

## 2018-05-15 ENCOUNTER — MYC REFILL (OUTPATIENT)
Dept: FAMILY MEDICINE | Facility: CLINIC | Age: 38
End: 2018-05-15

## 2018-05-15 ENCOUNTER — MYC MEDICAL ADVICE (OUTPATIENT)
Dept: FAMILY MEDICINE | Facility: CLINIC | Age: 38
End: 2018-05-15

## 2018-05-15 DIAGNOSIS — G47.33 OSA (OBSTRUCTIVE SLEEP APNEA): ICD-10-CM

## 2018-05-15 DIAGNOSIS — L20.9 ATOPIC DERMATITIS, UNSPECIFIED TYPE: Primary | ICD-10-CM

## 2018-05-15 DIAGNOSIS — L21.9 SEBORRHEIC DERMATITIS: ICD-10-CM

## 2018-05-15 NOTE — TELEPHONE ENCOUNTER
Cpap mask and tubing refill requst.  Last done 10/31/16, last OV 1/31/18.  Printed and put in PCP's folder for signature, then needs faxed to [facility].

## 2018-05-15 NOTE — TELEPHONE ENCOUNTER
Message from [employer]:  Original authorizing provider: MD Willard Sánchez would like a refill of the following medications:  order for DME [Kory Hudson MD]    Preferred pharmacy: [facility]    Comment:  This message is being sent by Belen Ortega on behalf of Willard Greenwood

## 2018-05-15 NOTE — TELEPHONE ENCOUNTER
Prior Authorization Approval    Authorization Effective Date: 5/14/2018  Authorization Expiration Date: 12/31/2039  Medication: pantoprazole (PROTONIX) 40 MG EC tablet APPROVED   Approved Dose/Quantity:   Reference #: [id number]   Insurance Company: [employer] - Phone [phone] Fax [phone]  Expected CoPay:       CoPay Card Available:      Foundation Assistance Needed:    Which Pharmacy is filling the prescription (Not needed for infusion/clinic administered): [place], [place] - [address] [place] [address]  Pharmacy Notified: Yes  Patient Notified: Yes

## 2018-05-16 RX ORDER — KETOCONAZOLE 20 MG/ML
SHAMPOO TOPICAL
Qty: 120 ML | Refills: 11 | Status: SHIPPED | OUTPATIENT
Start: 2018-05-16 | End: 2019-09-12

## 2018-05-16 RX ORDER — HYDROCORTISONE 2.5 %
CREAM (GRAM) TOPICAL 2 TIMES DAILY
Qty: 30 G | Refills: 2 | Status: SHIPPED | OUTPATIENT
Start: 2018-05-16 | End: 2021-09-01

## 2018-05-31 ENCOUNTER — OFFICE VISIT (OUTPATIENT)
Dept: FAMILY MEDICINE | Facility: CLINIC | Age: 38
End: 2018-05-31
Payer: COMMERCIAL

## 2018-05-31 VITALS
OXYGEN SATURATION: 97 % | SYSTOLIC BLOOD PRESSURE: 128 MMHG | WEIGHT: 315 LBS | DIASTOLIC BLOOD PRESSURE: 80 MMHG | BODY MASS INDEX: 44.1 KG/M2 | TEMPERATURE: 98.8 F | HEART RATE: 79 BPM | HEIGHT: 71 IN

## 2018-05-31 DIAGNOSIS — M17.0 PRIMARY OSTEOARTHRITIS OF BOTH KNEES: ICD-10-CM

## 2018-05-31 DIAGNOSIS — F17.200 TOBACCO USE DISORDER: ICD-10-CM

## 2018-05-31 DIAGNOSIS — F12.19: ICD-10-CM

## 2018-05-31 DIAGNOSIS — G47.33 OSA (OBSTRUCTIVE SLEEP APNEA): Primary | ICD-10-CM

## 2018-05-31 PROCEDURE — 99214 OFFICE O/P EST MOD 30 MIN: CPT | Performed by: FAMILY MEDICINE

## 2018-05-31 NOTE — PROGRESS NOTES
SUBJECTIVE:   Kimo Greenwood is a 37 year old male who presents to clinic today for the following health issues:  Patient presents to the clinic with his mother.     Patient with history of obstructive sleep apnea. His CPAP is ten or fifteen years old and he would like a new one. Patient's last sleep study was in 2013.     Patient with history of depression. He reports worsening mood related to marijuana withdrawal. Patient is establishing a new counselor and is trying to get connected with a psychiatrist.     Patient's mother is concerned about his drug use. Previously lost his job due to a positive drug screen. His mother recently removed him from a social situation involving drug use. He reports going through marijuana withdrawals with symptoms of shakiness, dizziness and vomiting. Inpatient treatment was recommended, however; insurance covers outpatient treatment. His mother is connecting him with outpatient treatment programs.     Patient with history of chronic low back pain. Previous injections with pain clinic. He also reports knee pain bilaterally.Taking naproxen and applying diclofenac topical gel does not improve the pain. Patient has scheduled future appointment with [facility].     Problem list and histories reviewed & adjusted, as indicated.  Additional history: as documented    Patient Active Problem List   Diagnosis     Speech/language delay     Tobacco use disorder     Nocturnal enuresis     Moderate major depression (H)     LOREN (obstructive sleep apnea)     Insomnia     Hypertension goal BP (blood pressure) < 140/90     Morbid obesity (H)     Leukocytosis     Suicidal ideation     Chronic low back pain     Bilateral low back pain with left-sided sciatica     History of colonic polyps     Mild intellectual disability     S/P total hip arthroplasty     Vitamin D deficiency     LPRD (laryngopharyngeal reflux disease)     Patel's esophagus determined by biopsy     Mild intermittent  asthma     Past Surgical History:   Procedure Laterality Date     ARTHROPLASTY HIP Left 1/25/2017    Procedure: ARTHROPLASTY HIP;  Surgeon: Bala Randall MD;  Location: [facility] OR     COLONOSCOPY       COLONOSCOPY N/A 10/30/2015    Procedure: COMBINED COLONOSCOPY, SINGLE OR MULTIPLE BIOPSY/POLYPECTOMY BY BIOPSY;  Surgeon: Alexis Jackson MD;  Location: [facility] GI     COLONOSCOPY N/A 11/17/2016    Procedure: COMBINED COLONOSCOPY, SINGLE OR MULTIPLE BIOPSY/POLYPECTOMY BY BIOPSY;  Surgeon: Cat Huber MD;  Location: [facility] GI     ESOPHAGOSCOPY, GASTROSCOPY, DUODENOSCOPY (EGD), COMBINED N/A 11/17/2016    Procedure: COMBINED ESOPHAGOSCOPY, GASTROSCOPY, DUODENOSCOPY (EGD), BIOPSY SINGLE OR MULTIPLE;  Surgeon: Cat Huber MD;  Location: [facility] GI     EXAM UNDER ANESTHESIA, FULGURATE CONDYLOMA ANUS, COMBINED N/A 12/22/2016    Procedure: COMBINED EXAM UNDER ANESTHESIA, FULGURATE CONDYLOMA ANUS;  Surgeon: Nya Cabello MD;  Location: [facility] OR     GENITOURINARY SURGERY      bladder, [facility], [facility],Interstem stimulator implant     ORTHOPEDIC SURGERY         Social History   Substance Use Topics     Smoking status: Heavy Tobacco Smoker     Packs/day: 0.50     Years: 1.00     Types: Cigarettes     Smokeless tobacco: Never Used      Comment: Smokes E Cigs equal to 1 PPD     Alcohol use Yes      Comment: socially     Family History   Problem Relation Age of Onset     Anxiety Disorder Mother      Depression Mother      Ulcerative Colitis Mother 18     Breast Cancer Maternal Grandmother      CEREBROVASCULAR DISEASE Maternal Grandmother      Breast Cancer Maternal Aunt      CANCER Maternal Grandfather      grt uncles colon cancer           Reviewed and updated as needed this visit by clinical staff  Tobacco  Allergies  Meds  Med Hx  Surg Hx  Fam Hx  Soc Hx      Reviewed and updated as needed this visit by Provider         ROS:  GI: as noted above   MUSCULOSKELETAL: as noted above   NEURO:  "as noted above   PSYCHIATRIC: as noted above     This document serves as a record of the services and decisions personally performed and made by Kory Hudson MD. It was created on his behalf by Jacklyn Orellana, a trained medical scribe. The creation of this document is based on the provider's statements to the medical scribe.  Jacklyn Orellana 7:10 AM May 31, 2018    OBJECTIVE:     /80 (BP Location: Right arm, Patient Position: Chair, Cuff Size: Adult Regular)  Pulse 79  Temp 98.8  F (37.1  C) (Oral)  Ht 1.803 m (5' 11\")  Wt (!) 161.5 kg (356 lb)  SpO2 97%  BMI 49.65 kg/m2  Body mass index is 49.65 kg/(m^2).  GENERAL: healthy, alert and no distress    ASSESSMENT/PLAN:     1. LOREN (obstructive sleep apnea)  Recommend follow up with sleep clinic for re-titration study as it has been about 10 years since initial study. Patient has had significant weight gain over that time.  - order for DME; Equipment being ordered: CPAP with mask and tubing  Dispense: 1 Units; Refill: 0  - SLEEP EVALUATION & MANAGEMENT REFERRAL - [facility]  [phone] (Age 18 and up); Future    2. Tobacco use disorder    3. Cannabis abuse with unspecified cannabis-induced disorder (H)  Recommend abstinence from cannabis with prior polysubstance issues. They are considering treatment programs but insurance difficulties at this time connecting with treatment program.     4. Primary osteoarthritis of both knees  Patient has had prior corticosteroid and glucosamine injections. Discussed topical diclofenac, trial medication low, follow up with orthopedics.  Will need eventual knee replacement but hoping to delay this to older age if able.  - nabumetone (RELAFEN) 750 MG tablet; Take 1 tablet (750 mg) by mouth 2 times daily as needed for moderate pain  Dispense: 60 tablet; Refill: 1    The information in this document, created by the medical scribe for me, accurately reflects the services I personally performed and " the decisions made by me. I have reviewed and approved this document for accuracy prior to leaving the patient care area.  May 31, 2018 7:36 AM    Kory Hudson MD  [facility]

## 2018-05-31 NOTE — MR AVS SNAPSHOT
After Visit Summary   5/31/2018    Kimo Greenwood    MRN: [id number]           Patient Information     Date Of Birth          1980        Visit Information        Provider Department      5/31/2018 7:00 AM Kory Hudson MD [facility]        Today's Diagnoses     LOREN (obstructive sleep apnea)    -  1    Tobacco use disorder        Cannabis abuse with unspecified cannabis-induced disorder (H)           Follow-ups after your visit        Additional Services     SLEEP EVALUATION & MANAGEMENT REFERRAL - [facility]  [phone] (Age 18 and up)       Please be aware that coverage of these services is subject to the terms and limitations of your health insurance plan.  Call member services at your health plan with any benefit or coverage questions.      Please bring the following to your appointment:    >>   List of current medications   >>   This referral request   >>   Any documents/labs given to you for this referral                      Future tests that were ordered for you today     Open Future Orders        Priority Expected Expires Ordered    SLEEP EVALUATION & MANAGEMENT REFERRAL - [facility]  [phone] (Age 18 and up) Routine  5/31/2019 5/31/2018            Who to contact     If you have questions or need follow up information about today's clinic visit or your schedule please contact [facility] directly at [phone].  Normal or non-critical lab and imaging results will be communicated to you by MyChart, letter or phone within 4 business days after the clinic has received the results. If you do not hear from us within 7 days, please contact the clinic through MyChart or phone. If you have a critical or abnormal lab result, we will notify you by phone as soon as possible.  Submit refill requests through [employer] or call your pharmacy and they will forward the refill request to us. Please  "allow 3 business days for your refill to be completed.          Additional Information About Your Visit        MyChart Information     [employer] gives you secure access to your electronic health record. If you see a primary care provider, you can also send messages to your care team and make appointments. If you have questions, please call your primary care clinic.  If you do not have a primary care provider, please call [phone] and they will assist you.        Care EveryWhere ID     This is your Care EveryWhere ID. This could be used by other organizations to access your [facility] medical records  [id number]        Your Vitals Were     Pulse Temperature Height Pulse Oximetry BMI (Body Mass Index)       79 98.8  F (37.1  C) (Oral) 5' 11\" (1.803 m) 97% 49.65 kg/m2        Blood Pressure from Last 3 Encounters:   05/31/18 128/80   01/31/18 126/82   01/30/18 135/89    Weight from Last 3 Encounters:   05/31/18 (!) 356 lb (161.5 kg)   01/31/18 (!) 340 lb 11.2 oz (154.5 kg)   12/11/17 (!) 350 lb 6.4 oz (158.9 kg)                 Today's Medication Changes          These changes are accurate as of 5/31/18  7:31 AM.  If you have any questions, ask your nurse or doctor.               Start taking these medicines.        Dose/Directions    order for DME   Used for:  LOREN (obstructive sleep apnea)   Started by:  Kory Hudson MD        Equipment being ordered: CPAP with mask and tubing   Quantity:  1 Units   Refills:  0            Where to get your medicines      Some of these will need a paper prescription and others can be bought over the counter.  Ask your nurse if you have questions.     Bring a paper prescription for each of these medications     order for DME                Primary Care Provider Office Phone # Fax #    Kory Hudson [phone] [address]  [facility] [address]        Equal Access to Services     JOSE CRUZ DEUTSCH AH: Romelia Squires, tracy teran, qaybta " paola hetaye mclain ah. [facility] [phone].    ATENCIÓN: Si vita higuera, tiene a meng disposición servicios gratuitos de asistencia lingüística. Tesha al [phone].    We comply with applicable federal civil rights laws and [place] laws. We do not discriminate on the basis of race, color, national origin, age, disability, sex, sexual orientation, or gender identity.            Thank you!     Thank you for choosing [facility]  for your care. Our goal is always to provide you with excellent care. Hearing back from our patients is one way we can continue to improve our services. Please take a few minutes to complete the written survey that you may receive in the mail after your visit with us. Thank you!             Your Updated Medication List - Protect others around you: Learn how to safely use, store and throw away your medicines at www.disposemymeds.org.          This list is accurate as of 5/31/18  7:31 AM.  Always use your most recent med list.                   Brand Name Dispense Instructions for use Diagnosis    ARIPiprazole 5 MG tablet    ABILIFY    30 tablet    Take 1 tablet (5 mg) by mouth every morning    Major depressive disorder, recurrent episode, moderate (H)       aspirin 325 MG EC tablet     70 tablet    Take one Aspirin tab twice daily for 5 weeks.    Status post total replacement of left hip       cholecalciferol 2000 units Caps     30 capsule    Take 1 capsule by mouth daily    Vitamin D deficiency       diclofenac 1 % Gel topical gel    VOLTAREN    100 g    Place 2-4 g onto the skin 4 times daily as needed for moderate pain    Chronic lumbosacral pain, Knee pain, unspecified chronicity, unspecified laterality       FLUoxetine 40 MG capsule    PROzac    30 capsule    Take 1 capsule (40 mg) by mouth daily    Moderate major depression (H)       fluticasone 50 MCG/ACT spray    FLONASE     Spray 2 sprays into both nostrils every morning         hydrocortisone 2.5 % cream     30 g    Apply topically 2 times daily Apply sparing to face for 1-2 weeks.    Seborrheic dermatitis       ketoconazole 2 % shampoo    NIZORAL    120 mL    Apply to the scalp and beard two times per week and wash off after 5 minutes.    Seborrheic dermatitis       lisinopril 10 MG tablet    PRINIVIL/ZESTRIL    90 tablet    Take 1 tablet (10 mg) by mouth every morning    Hypertension goal BP (blood pressure) < 140/90       mometasone-formoterol 100-5 MCG/ACT oral inhaler    DULERA    1 Inhaler    Inhale 2 puffs into the lungs 2 times daily    Mild intermittent asthma without complication       ondansetron 4 MG ODT tab    ZOFRAN ODT    15 tablet    Take 1 tablet (4 mg) by mouth every 8 hours as needed for nausea        order for DME     1 each    auto CPAP 7-15 cm/h20    Obstructive sleep apnea (adult) (pediatric)       order for DME     30 each    Equipment being ordered: pull ups    Nocturnal enuresis       order for DME     1 Units    Equipment being ordered: CPAP mask and tubing    LOREN (obstructive sleep apnea)       order for DME     1 Units    Equipment being ordered: CPAP with mask and tubing    LOREN (obstructive sleep apnea)       pantoprazole 40 MG EC tablet    PROTONIX    90 tablet    Take 1 tablet (40 mg) by mouth daily Take 30-60 minutes before first meal of the day    Duodenitis, LPRD (laryngopharyngeal reflux disease)       psyllium 0.52 g capsule     180 capsule    Take 1 capsule (0.52 g) by mouth daily    Slow transit constipation       VENTOLIN  (90 Base) MCG/ACT Inhaler   Generic drug:  albuterol     1 Inhaler    INHALE 2 PUFFS INTO LUNGS EVERY SIX HOURS AS NEEDED FOR SHORTNESS OF BREATH / DYSPNEA OR WHEEZING    Mild intermittent asthma without complication

## 2018-06-04 ENCOUNTER — TRANSFERRED RECORDS (OUTPATIENT)
Dept: HEALTH INFORMATION MANAGEMENT | Facility: CLINIC | Age: 38
End: 2018-06-04

## 2018-06-07 ENCOUNTER — HOSPITAL ENCOUNTER (INPATIENT)
Facility: CLINIC | Age: 38
LOS: 1 days | Discharge: HOME | DRG: 885 | End: 2018-06-08
Attending: EMERGENCY MEDICINE | Admitting: PSYCHIATRY & NEUROLOGY
Payer: COMMERCIAL

## 2018-06-07 DIAGNOSIS — F32.1 MODERATE MAJOR DEPRESSION (H): ICD-10-CM

## 2018-06-07 DIAGNOSIS — S51.811A LACERATION OF RIGHT FOREARM: ICD-10-CM

## 2018-06-07 DIAGNOSIS — R45.851 SUICIDAL IDEATION: ICD-10-CM

## 2018-06-07 DIAGNOSIS — F33.1 MAJOR DEPRESSIVE DISORDER, RECURRENT EPISODE, MODERATE (H): ICD-10-CM

## 2018-06-07 PROCEDURE — 82075 ASSAY OF BREATH ETHANOL: CPT

## 2018-06-07 PROCEDURE — 99285 EMERGENCY DEPT VISIT HI MDM: CPT | Mod: Z6 | Performed by: EMERGENCY MEDICINE

## 2018-06-07 PROCEDURE — 99285 EMERGENCY DEPT VISIT HI MDM: CPT | Mod: 25

## 2018-06-07 PROCEDURE — 90791 PSYCH DIAGNOSTIC EVALUATION: CPT

## 2018-06-07 NOTE — IP AVS SNAPSHOT
MRN:[id number]                      After Visit Summary   6/7/2018    Kimo Greenwood    MRN: [id number]           Thank you!     Thank you for choosing [facility] for your care. Our goal is always to provide you with excellent care.        Patient Information     Date Of Birth          1980        Designated Caregiver       Most Recent Value    Caregiver    Will someone help with your care after discharge? yes    Name of designated caregiver Mother    Phone number of caregiver [phone]    Caregiver address see chart      About your hospital stay     You were admitted on:  June 8, 2018 You last received care in the:  [facility]    You were discharged on:  June 8, 2018       Who to Call     For medical emergencies, please call 911.  For non-urgent questions about your medical care, please call your primary care provider or clinic, [phone]          Attending Provider     Provider Specialty    Narciso Marin MD Emergency Medicine    Susana, Stephanie King MD Psychiatry       Primary Care Provider Office Phone # Fax #    Kory Hudson [phone]      Further instructions from your care team        Behavioral Discharge Planning and Instructions      Summary:  You were admitted on 6/7/2018  due to Suicidal Ideations and Self Injurious Behaviors.  You were treated by Dr. 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     Health Care Follow-up Appointments:   - Therapy: You have an appointment on Monday with a new Therapist - please follow up with them.      - [facility] :  Danna Moreira  Phone: [phone]     - ILS Worker- pt unable to tell me name of ILS worker- but states he does have one.     Additional Resources:   - DBT Program Options  [facility]   [address]  [address]  [place] [address]  Phone: [phone]  Fax: [phone]    - [facility]  [address], [address]  [place] [address]  Phone: [phone]  Fax: [phone]    Attend all scheduled appointments with your outpatient providers. Call at least 24 hours in advance if you need to reschedule an appointment to ensure continued access to your outpatient providers.   Major Treatments, Procedures and Findings:  You were provided with: a psychiatric assessment, medication evaluation and/or management, family therapy and milieu management    Symptoms to Report: feeling more aggressive, increased confusion, losing more sleep, mood getting worse or thoughts of suicide    Early warning signs can include: increased depression or anxiety sleep disturbances increased thoughts or behaviors of suicide or self-harm  increased unusual thinking, such as paranoia or hearing voices    Safety and Wellness:  Take all medicines as directed.  Make no changes unless your doctor suggests them.      Follow treatment recommendations.  Refrain from alcohol and non-prescribed drugs.  If there is a concern for safety, call 911.    Resources:   Crisis Intervention: 842.498.3952 or 688-339-8867 (TTY: 461.405.4269).  Call anytime for help.  National [place] on Mental Illness (www.[place].jeniffer.org): 521.113.2355 or 657-264-8443.  [place] Association for Children's Mental Health (www.macmh.org): 885.972.6282.  Alcoholics Anonymous (www.alcoholics-anonymous.org): Check your phone book for your local chapter.  Suicide Awareness Voices of Education (SAVE) (www.save.org): 313-045-RGSM (3671)  National Suicide Prevention Line (www.mentalhealthmn.org): 069-125-SQBM (8493)  Mental Health Consumer/Survivor Network of [place] (www.mhcsn.net): 651.375.7324 or 229-569-1997  Mental Health Association of [place] (www.mentalhealth.org): 995.507.1921 or 946-881-6800  Self- Management and Recovery Training., SMART-- Toll  "free: 208.286.3272  www.Roadtrippers.org  [facility] Crisis (COPE) Response - Adult [phone]  [facility] Crisis Response - Adult 190 090-2692  Text 4 Life: txt \"LIFE\" to 42382 for immediate support and crisis intervention  Crisis text line: Text \"[place]\" to 679543. Free, confidential, 24/7.  Crisis Intervention: 159.841.5574 or 935-681-9376. Call anytime for help.   [facility] Mental Health Crisis Team - Child: [phone]  [facility] Mental Health Crisis Response Team - Child: [phone]    The treatment team has appreciated the opportunity to work with you.  If you have any questions or concerns our unit number is [phone].        Pending Results     No orders found for last 3 day(s).            Admission Information     Date & Time Provider Department Dept. Phone    6/7/2018 Stephanie Meza MD [address] [phone]      Your Vitals Were     Blood Pressure Pulse Temperature Respirations Height Weight    135/70 63 98.1  F (36.7  C) (Tympanic) 12 1.803 m (5' 11\") 151.5 kg (334 lb)    Pulse Oximetry BMI (Body Mass Index)                98% 46.58 kg/m2          MyChart Information     [facility] gives you secure access to your electronic health record. If you see a primary care provider, you can also send messages to your care team and make appointments. If you have questions, please call your primary care clinic.  If you do not have a primary care provider, please call [phone] and they will assist you.        Care EveryWhere ID     This is your Care EveryWhere ID. This could be used by other organizations to access your [facility] medical records  [id number]        Equal Access to Services     JOSE CRUZ DEUTSCH : tracy Aviles, paola coughlin. So [facility] [phone].    ATENCIÓN: Si habla español, tiene a meng disposición servicios gratuitos de asistencia lingüística. Llame al " [phone].    We comply with applicable federal civil rights laws and [place] laws. We do not discriminate on the basis of race, color, national origin, age, disability, sex, sexual orientation, or gender identity.               Review of your medicines      CONTINUE these medicines which may have CHANGED, or have new prescriptions. If we are uncertain of the size of tablets/capsules you have at home, strength may be listed as something that might have changed.        Dose / Directions    ARIPiprazole 5 MG tablet   Commonly known as:  ABILIFY   This may have changed:  how much to take   Used for:  Major depressive disorder, recurrent episode, moderate (H)        Dose:  7.5 mg   Take 1.5 tablets (7.5 mg) by mouth every morning   Quantity:  45 tablet   Refills:  1       FLUoxetine 20 MG capsule   Commonly known as:  PROzac   This may have changed:    - medication strength  - how much to take   Used for:  Moderate major depression (H)        Dose:  60 mg   Take 3 capsules (60 mg) by mouth daily   Quantity:  90 capsule   Refills:  1         CONTINUE these medicines which have NOT CHANGED        Dose / Directions    cholecalciferol 2000 units Caps   Used for:  Vitamin D deficiency        Dose:  1 capsule   Take 1 capsule by mouth daily   Quantity:  30 capsule   Refills:  11       diclofenac 1 % Gel topical gel   Commonly known as:  VOLTAREN   Used for:  Chronic lumbosacral pain, Knee pain, unspecified chronicity, unspecified laterality        Dose:  2-4 g   Place 2-4 g onto the skin 4 times daily as needed for moderate pain   Quantity:  100 g   Refills:  4       fluticasone 50 MCG/ACT spray   Commonly known as:  FLONASE        Dose:  2 spray   Spray 2 sprays into both nostrils every morning   Refills:  0       hydrocortisone 2.5 % cream   Used for:  Seborrheic dermatitis        Apply topically 2 times daily Apply sparing to face for 1-2 weeks.   Quantity:  30 g   Refills:  2       ketoconazole 2 % shampoo   Commonly  known as:  NIZORAL   Used for:  Seborrheic dermatitis        Apply to the scalp and beard two times per week and wash off after 5 minutes.   Quantity:  120 mL   Refills:  11       lisinopril 10 MG tablet   Commonly known as:  PRINIVIL/ZESTRIL   Used for:  Hypertension goal BP (blood pressure) < 140/90        Dose:  10 mg   Take 1 tablet (10 mg) by mouth every morning   Quantity:  90 tablet   Refills:  3       mometasone-formoterol 100-5 MCG/ACT oral inhaler   Commonly known as:  DULERA   Used for:  Mild intermittent asthma without complication        Dose:  2 puff   Inhale 2 puffs into the lungs 2 times daily   Quantity:  1 Inhaler   Refills:  11       ondansetron 4 MG ODT tab   Commonly known as:  ZOFRAN ODT        Dose:  4 mg   Take 1 tablet (4 mg) by mouth every 8 hours as needed for nausea   Quantity:  15 tablet   Refills:  0       order for DME   Used for:  Obstructive sleep apnea (adult) (pediatric)        auto CPAP 7-15 cm/h20   Quantity:  1 each   Refills:  0       order for DME   Used for:  Nocturnal enuresis        Equipment being ordered: pull ups   Quantity:  30 each   Refills:  6       order for DME   Used for:  LOREN (obstructive sleep apnea)        Equipment being ordered: CPAP mask and tubing   Quantity:  1 Units   Refills:  0       order for DME   Used for:  LOREN (obstructive sleep apnea)        Equipment being ordered: CPAP with mask and tubing   Quantity:  1 Units   Refills:  0       pantoprazole 40 MG EC tablet   Commonly known as:  PROTONIX   Used for:  Duodenitis, LPRD (laryngopharyngeal reflux disease)        Dose:  40 mg   Take 1 tablet (40 mg) by mouth daily Take 30-60 minutes before first meal of the day   Quantity:  90 tablet   Refills:  3       psyllium 0.52 g capsule   Used for:  Slow transit constipation        Dose:  1 capsule   Take 1 capsule (0.52 g) by mouth daily   Quantity:  180 capsule   Refills:  3       VENTOLIN  (90 Base) MCG/ACT Inhaler   Used for:  Mild intermittent  asthma without complication   Generic drug:  albuterol        INHALE 2 PUFFS INTO LUNGS EVERY SIX HOURS AS NEEDED FOR SHORTNESS OF BREATH / DYSPNEA OR WHEEZING   Quantity:  1 Inhaler   Refills:  0         STOP taking     aspirin 325 MG EC tablet           nabumetone 750 MG tablet   Commonly known as:  RELAFEN                Where to get your medicines      These medications were sent to [facility] [place], [place] - [address]  [address], [facility] [address]     Phone:  [phone]     ARIPiprazole 5 MG tablet    FLUoxetine 20 MG capsule                Protect others around you: Learn how to safely use, store and throw away your medicines at www.disposemymeds.org.             Medication List: This is a list of all your medications and when to take them. Check marks below indicate your daily home schedule. Keep this list as a reference.      Medications           Morning Afternoon Evening Bedtime As Needed    ARIPiprazole 5 MG tablet   Commonly known as:  ABILIFY   Take 1.5 tablets (7.5 mg) by mouth every morning   Last time this was given:  5 mg on 6/8/2018  7:58 AM                                cholecalciferol 2000 units Caps   Take 1 capsule by mouth daily                                diclofenac 1 % Gel topical gel   Commonly known as:  VOLTAREN   Place 2-4 g onto the skin 4 times daily as needed for moderate pain                                FLUoxetine 20 MG capsule   Commonly known as:  PROzac   Take 3 capsules (60 mg) by mouth daily   Last time this was given:  40 mg on 6/8/2018  7:57 AM                                fluticasone 50 MCG/ACT spray   Commonly known as:  FLONASE   Spray 2 sprays into both nostrils every morning                                hydrocortisone 2.5 % cream   Apply topically 2 times daily Apply sparing to face for 1-2 weeks.                                ketoconazole 2 % shampoo   Commonly known as:  NIZORAL   Apply to the scalp and beard two  times per week and wash off after 5 minutes.                                lisinopril 10 MG tablet   Commonly known as:  PRINIVIL/ZESTRIL   Take 1 tablet (10 mg) by mouth every morning   Last time this was given:  10 mg on 6/8/2018  7:57 AM                                mometasone-formoterol 100-5 MCG/ACT oral inhaler   Commonly known as:  DULERA   Inhale 2 puffs into the lungs 2 times daily                                ondansetron 4 MG ODT tab   Commonly known as:  ZOFRAN ODT   Take 1 tablet (4 mg) by mouth every 8 hours as needed for nausea                                order for DME   auto CPAP 7-15 cm/h20                                order for DME   Equipment being ordered: pull ups                                order for DME   Equipment being ordered: CPAP mask and tubing                                order for DME   Equipment being ordered: CPAP with mask and tubing                                pantoprazole 40 MG EC tablet   Commonly known as:  PROTONIX   Take 1 tablet (40 mg) by mouth daily Take 30-60 minutes before first meal of the day                                psyllium 0.52 g capsule   Take 1 capsule (0.52 g) by mouth daily                                VENTOLIN  (90 Base) MCG/ACT Inhaler   INHALE 2 PUFFS INTO LUNGS EVERY SIX HOURS AS NEEDED FOR SHORTNESS OF BREATH / DYSPNEA OR WHEEZING   Generic drug:  albuterol

## 2018-06-08 VITALS
WEIGHT: 315 LBS | OXYGEN SATURATION: 98 % | DIASTOLIC BLOOD PRESSURE: 70 MMHG | SYSTOLIC BLOOD PRESSURE: 135 MMHG | HEIGHT: 71 IN | TEMPERATURE: 98.1 F | BODY MASS INDEX: 44.1 KG/M2 | RESPIRATION RATE: 12 BRPM | HEART RATE: 63 BPM

## 2018-06-08 LAB — ALCOHOL BREATH TEST: 0.06 (ref 0–0.01)

## 2018-06-08 PROCEDURE — 12400001 ZZH R&B MH UMMC

## 2018-06-08 PROCEDURE — 99234 HOSP IP/OBS SM DT SF/LOW 45: CPT | Performed by: PSYCHIATRY & NEUROLOGY

## 2018-06-08 PROCEDURE — 25000132 ZZH RX MED GY IP 250 OP 250 PS 637: Performed by: EMERGENCY MEDICINE

## 2018-06-08 PROCEDURE — 99207 ZZC DOWN CODE DUE TO SUBSEQUENT EXAM: CPT | Performed by: PSYCHIATRY & NEUROLOGY

## 2018-06-08 PROCEDURE — 25000125 ZZHC RX 250: Performed by: EMERGENCY MEDICINE

## 2018-06-08 RX ORDER — ARIPIPRAZOLE 5 MG/1
5 TABLET ORAL ONCE
Status: COMPLETED | OUTPATIENT
Start: 2018-06-08 | End: 2018-06-08

## 2018-06-08 RX ORDER — ALBUTEROL SULFATE 90 UG/1
2 AEROSOL, METERED RESPIRATORY (INHALATION) EVERY 6 HOURS PRN
Status: DISCONTINUED | OUTPATIENT
Start: 2018-06-08 | End: 2018-06-08 | Stop reason: HOSPADM

## 2018-06-08 RX ORDER — PANTOPRAZOLE SODIUM 40 MG/1
40 TABLET, DELAYED RELEASE ORAL DAILY
Status: DISCONTINUED | OUTPATIENT
Start: 2018-06-08 | End: 2018-06-08 | Stop reason: HOSPADM

## 2018-06-08 RX ORDER — NICOTINE 21 MG/24HR
1 PATCH, TRANSDERMAL 24 HOURS TRANSDERMAL DAILY
Status: DISCONTINUED | OUTPATIENT
Start: 2018-06-08 | End: 2018-06-08 | Stop reason: HOSPADM

## 2018-06-08 RX ORDER — LISINOPRIL 10 MG/1
10 TABLET ORAL ONCE
Status: COMPLETED | OUTPATIENT
Start: 2018-06-08 | End: 2018-06-08

## 2018-06-08 RX ORDER — HYDROXYZINE HYDROCHLORIDE 25 MG/1
25 TABLET, FILM COATED ORAL EVERY 4 HOURS PRN
Status: DISCONTINUED | OUTPATIENT
Start: 2018-06-08 | End: 2018-06-08 | Stop reason: HOSPADM

## 2018-06-08 RX ORDER — ARIPIPRAZOLE 5 MG/1
5 TABLET ORAL EVERY MORNING
Status: DISCONTINUED | OUTPATIENT
Start: 2018-06-09 | End: 2018-06-08

## 2018-06-08 RX ORDER — ARIPIPRAZOLE 5 MG/1
7.5 TABLET ORAL EVERY MORNING
Qty: 45 TABLET | Refills: 1 | Status: SHIPPED | OUTPATIENT
Start: 2018-06-08 | End: 2018-06-14

## 2018-06-08 RX ORDER — ACETAMINOPHEN 325 MG/1
650 TABLET ORAL EVERY 4 HOURS PRN
Status: DISCONTINUED | OUTPATIENT
Start: 2018-06-08 | End: 2018-06-08 | Stop reason: HOSPADM

## 2018-06-08 RX ORDER — LISINOPRIL 2.5 MG/1
10 TABLET ORAL EVERY MORNING
Status: DISCONTINUED | OUTPATIENT
Start: 2018-06-09 | End: 2018-06-08 | Stop reason: HOSPADM

## 2018-06-08 RX ORDER — FLUTICASONE PROPIONATE 50 MCG
2 SPRAY, SUSPENSION (ML) NASAL EVERY MORNING
Status: DISCONTINUED | OUTPATIENT
Start: 2018-06-08 | End: 2018-06-08 | Stop reason: HOSPADM

## 2018-06-08 RX ORDER — HYDROCORTISONE 2.5 %
CREAM (GRAM) TOPICAL 2 TIMES DAILY PRN
Status: DISCONTINUED | OUTPATIENT
Start: 2018-06-08 | End: 2018-06-08 | Stop reason: HOSPADM

## 2018-06-08 RX ORDER — KETOCONAZOLE 20 MG/ML
SHAMPOO TOPICAL DAILY PRN
Status: DISCONTINUED | OUTPATIENT
Start: 2018-06-08 | End: 2018-06-08 | Stop reason: HOSPADM

## 2018-06-08 RX ORDER — ARIPIPRAZOLE 5 MG/1
5 TABLET ORAL EVERY MORNING
Status: DISCONTINUED | OUTPATIENT
Start: 2018-06-08 | End: 2018-06-08

## 2018-06-08 RX ORDER — OLANZAPINE 10 MG/2ML
10 INJECTION, POWDER, FOR SOLUTION INTRAMUSCULAR
Status: DISCONTINUED | OUTPATIENT
Start: 2018-06-08 | End: 2018-06-08 | Stop reason: HOSPADM

## 2018-06-08 RX ORDER — LISINOPRIL 2.5 MG/1
10 TABLET ORAL EVERY MORNING
Status: DISCONTINUED | OUTPATIENT
Start: 2018-06-08 | End: 2018-06-08

## 2018-06-08 RX ORDER — OLANZAPINE 10 MG/1
10 TABLET ORAL
Status: DISCONTINUED | OUTPATIENT
Start: 2018-06-08 | End: 2018-06-08 | Stop reason: HOSPADM

## 2018-06-08 RX ORDER — OLANZAPINE 10 MG/2ML
10 INJECTION, POWDER, FOR SOLUTION INTRAMUSCULAR ONCE
Status: COMPLETED | OUTPATIENT
Start: 2018-06-08 | End: 2018-06-08

## 2018-06-08 RX ORDER — ONDANSETRON 4 MG/1
4 TABLET, ORALLY DISINTEGRATING ORAL EVERY 8 HOURS PRN
Status: DISCONTINUED | OUTPATIENT
Start: 2018-06-08 | End: 2018-06-08 | Stop reason: HOSPADM

## 2018-06-08 RX ADMIN — LISINOPRIL 10 MG: 10 TABLET ORAL at 07:57

## 2018-06-08 RX ADMIN — FLUOXETINE 40 MG: 20 CAPSULE ORAL at 07:57

## 2018-06-08 RX ADMIN — OLANZAPINE 10 MG: 10 INJECTION, POWDER, LYOPHILIZED, FOR SOLUTION INTRAMUSCULAR at 01:33

## 2018-06-08 RX ADMIN — ARIPIPRAZOLE 5 MG: 5 TABLET ORAL at 07:58

## 2018-06-08 ASSESSMENT — ACTIVITIES OF DAILY LIVING (ADL)
RETIRED_EATING: 0-->INDEPENDENT
TRANSFERRING: 0-->INDEPENDENT
GROOMING: INDEPENDENT
RETIRED_COMMUNICATION: 2-->DIFFICULTY UNDERSTANDING (NOT RELATED TO LANGUAGE BARRIER)
TOILETING: 0-->INDEPENDENT
BATHING: 0-->INDEPENDENT
AMBULATION: 0-->INDEPENDENT
SWALLOWING: 0-->SWALLOWS FOODS/LIQUIDS WITHOUT DIFFICULTY
DRESS: SCRUBS (BEHAVIORAL HEALTH)
ORAL_HYGIENE: INDEPENDENT
FALL_HISTORY_WITHIN_LAST_SIX_MONTHS: NO
DRESS: 0-->INDEPENDENT
COGNITION: 2 - DIFFICULTY WITH ORGANIZING THOUGHTS

## 2018-06-08 ASSESSMENT — ENCOUNTER SYMPTOMS
NERVOUS/ANXIOUS: 1
DYSPHORIC MOOD: 1
BRUISES/BLEEDS EASILY: 0
ARTHRALGIAS: 0
VOMITING: 0
SHORTNESS OF BREATH: 0
SLEEP DISTURBANCE: 1
FATIGUE: 0
AGITATION: 1
CHEST TIGHTNESS: 0
CONSTITUTIONAL NEGATIVE: 1
SEIZURES: 0
CHILLS: 0
ABDOMINAL PAIN: 0
NECK STIFFNESS: 0
FEVER: 0
HEADACHES: 0
NECK PAIN: 0
WOUND: 1
COUGH: 0
MYALGIAS: 0
PALPITATIONS: 0
SORE THROAT: 0
DIZZINESS: 0
NAUSEA: 0
HALLUCINATIONS: 0
APPETITE CHANGE: 0

## 2018-06-08 NOTE — PLAN OF CARE
Problem: Mood Impairment (Depressive Signs/Symptoms) (Adult)  Goal: Improved Mood Symptoms (Depressive Signs/Symptoms)  Outcome: Adequate for Discharge Date Met: 06/08/18  Pt is discharged to the care of his mother. Discharge and medication instructions were discussed with mother, who agrees with all instructions. Medications sent with mother. Pt denies SI/SIB and HI and states he feels safe to discharge. All belongings were returned to pt.

## 2018-06-08 NOTE — PROGRESS NOTES
Initial Psychosocial Assessment    I have reviewed the chart, met with the patient, and developed Care Plan.  Information for assessment was obtained from: interview w/ patient and chart review      Presenting Problem:  This patient is a 37 year old male that was brought in by his Mother (Guardian) due to suicidal ideation and self-injurious behaviors.  According to notes: he was cutting on himself (to feel pain) and then took 2 pain pills instead of 1. Mother believes it was a suicide attempt.  Pt has an IQ of 50.  Mother is reporting that he has been paranoid, depressed and using marijuana. When he was in the ED he did push a staff person, and required restraint therefore he was admitted to [facility] a 72 hr hold- However, his Mother did sign him in as his Guardian.    He has been isolating more and seeming very depressed according to Mom. She has also noted weight loss, crying, sleeping more and isolating. Pt has been more paranoid.   Mother had him do a R25 a few months ago- they recommended [facility] for treatment. His insurance would not cover and therefore he did not go.   He had a job a few months ago - failed a drug test and was let go.       History of Mental Health and Chemical Dependency:  Pt was hospitalized here at [facility] [address] in 2015   One psych admission in 2010 as well.   Pt did had a suicide attempt approximately 12 years ago- attempt by hanging.   He completed a Day Treatment program a couple of years ago    Pt does smoke marijuana    Family Description (Constellation, Family Psychiatric History):  Family History of Depression on maternal side.  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 Kory Hudson  [address] [place] [address]  Phone: [phone]    - Therapy: no one currently- is supposed to be starting with a new therapist on Monday    - [facility] :  Danna Moreira  Phone: [phone]    - ILS Worker- pt unable to tell me name of ILS worker- but states he does have one.     Social Service Assessment/Plan:  Patient will have psychiatric assessment and medication management by the psychiatrist. Medications will be reviewed and adjusted per MD as indicated. The treatment team will continue to assess and stabilize the patient's mental health symptoms with the use of medications and therapeutic programming. Hospital staff will provide a safe environment and a therapeutic milieu. Staff will continue to assess patient as needed. Patient will participate in unit groups and activities. Patient will receive individual and group support on the unit.     CTC will do individual inpatient treatment planning and after care planning. CTC will discuss options for increasing community supports with the patient. CTC will coordinate with outpatient providers and will place referrals to ensure appropriate follow up care is in place.

## 2018-06-08 NOTE — ED NOTES
Patient refusing to provide urine sample. Patient attempting to elope. Security able to stop and restrain pt.

## 2018-06-08 NOTE — ED PROVIDER NOTES
"    [facility] [place] ([facility])    6/07/18       History     Chief Complaint   Patient presents with     Suicidal     LIves with mother who is legal guardian; pt has been depressed lately, took his relafen tonight with 3 shots of vodka; pt denies it was an attempt to kill himself--just has been depressed and wanted to just sleep it off. MOther tried to wake him up and got no response so she called 911. Patient Stated he was just ignoring his mother.     EMANUEL Greenwood is a 37 year old male with a medical history significant for depression and mild mental retardation who presents to the Emergency Department for evaluation of suicidal ideation.  The patient's mother is his legal guardian.  The mother reports that the patient has been using marijuana on and off and was drinking tonight.  The patient called his ILS worker and told them that he was going to kill himself.  The timeline of this is unknown, whether this was before or after he was drinking.  The patient did cut himself on his arm and has some superficial lacerations.  The patient reports that this was not an attempt on his life, he just wanted to \"feel some pain\".  The mother is concerned that the patient is a danger to himself and is going to kill himself.  The patient denies any suicidal ideation and wants to go home.  The patient has been hospitalized in the past for suicidal ideation.  The mother reports that the patient's paranoia has been worsening recently, stating that he believes people are talking about him at the gas station.  The patient does admit that he has been more depressed lately, he denies any specific triggers.  Patient is currently on Relafen for arthritis pain.  The mother reports that the patient took 2 tablets today and set up 1.  The patient reports that he was confused about the dosage and thought he was supposed to be taking 2 at a time.  The patient later flushed the remainder of the pills down the " toilet as he did not want to take them anymore.  Patient does not currently have a psychiatrist.  He has reportedly been making suicidal threats and comments, cutting himself on the right forearm, and has been increasingly depressed.  I have reviewed the Medications, Allergies, Past Medical and Surgical History, and Social History in the [facility] system.    Past Medical History:   Diagnosis Date     Hypertension      Leukocytosis 6/9/2014     Marijuana abuse      MDD (major depressive disorder)      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    With associated speech/language delay     Obesity 11/11/2010     LOREN (obstructive sleep apnea)      Other chronic pain     left hip/leg pain     Seborrheic dermatitis      Uncomplicated asthma        Past Surgical History:   Procedure Laterality Date     ARTHROPLASTY HIP Left 1/25/2017    Procedure: ARTHROPLASTY HIP;  Surgeon: Bala Randall MD;  Location:  OR     COLONOSCOPY       COLONOSCOPY N/A 10/30/2015    Procedure: COMBINED COLONOSCOPY, SINGLE OR MULTIPLE BIOPSY/POLYPECTOMY BY BIOPSY;  Surgeon: Alexis Jackson MD;  Location:  GI     COLONOSCOPY N/A 11/17/2016    Procedure: COMBINED COLONOSCOPY, SINGLE OR MULTIPLE BIOPSY/POLYPECTOMY BY BIOPSY;  Surgeon: Cat Huber MD;  Location: [facility] GI     ESOPHAGOSCOPY, GASTROSCOPY, DUODENOSCOPY (EGD), COMBINED N/A 11/17/2016    Procedure: COMBINED ESOPHAGOSCOPY, GASTROSCOPY, DUODENOSCOPY (EGD), BIOPSY SINGLE OR MULTIPLE;  Surgeon: Cat Huber MD;  Location: [facility] GI     EXAM UNDER ANESTHESIA, FULGURATE CONDYLOMA ANUS, COMBINED N/A 12/22/2016    Procedure: COMBINED EXAM UNDER ANESTHESIA, FULGURATE CONDYLOMA ANUS;  Surgeon: Nya Cabello MD;  Location: [facility] OR     GENITOURINARY SURGERY      bladder, [place], [facility],Interstem stimulator implant     ORTHOPEDIC SURGERY         Family History   Problem Relation Age of Onset     Anxiety Disorder Mother      Depression Mother       "Ulcerative Colitis Mother 18     Breast Cancer Maternal Grandmother      CEREBROVASCULAR DISEASE Maternal Grandmother      Breast Cancer Maternal Aunt      CANCER Maternal Grandfather      grt uncles colon cancer       Social History   Substance Use Topics     Smoking status: Former Smoker     Packs/day: 0.50     Years: 1.00     Types: Cigarettes     Smokeless tobacco: Never Used      Comment: Smokes E Cigs equal to 1 PPD     Alcohol use Yes      Comment: socially--\"not very often\" \"once a month\"       No current facility-administered medications for this encounter.      Current Outpatient Prescriptions   Medication     ARIPiprazole (ABILIFY) 5 MG tablet     aspirin  MG EC tablet     cholecalciferol 2000 UNITS CAPS     diclofenac (VOLTAREN) 1 % GEL topical gel     FLUoxetine (PROZAC) 40 MG capsule     fluticasone (FLONASE) 50 MCG/ACT spray     hydrocortisone 2.5 % cream     ketoconazole (NIZORAL) 2 % shampoo     lisinopril (PRINIVIL/ZESTRIL) 10 MG tablet     mometasone-formoterol (DULERA) 100-5 MCG/ACT oral inhaler     nabumetone (RELAFEN) 750 MG tablet     ondansetron (ZOFRAN ODT) 4 MG disintegrating tablet     order for DME     order for DME     order for DME     ORDER FOR DME     pantoprazole (PROTONIX) 40 MG EC tablet     psyllium 0.52 G capsule     VENTOLIN  (90 BASE) MCG/ACT Inhaler     [DISCONTINUED] ORDER FOR DME        Allergies   Allergen Reactions     No Clinical Screening - See Comments Other (See Comments)     Awoke from anesthesia with anger and ripping off dressing, after interstim placement, outside hospital 2013         Review of Systems   Constitutional: Negative.  Negative for appetite change, chills, fatigue and fever.   HENT: Negative for congestion and sore throat.    Eyes: Negative for visual disturbance.   Respiratory: Negative for cough, chest tightness and shortness of breath.    Cardiovascular: Negative for chest pain and palpitations.   Gastrointestinal: Negative for " abdominal pain, nausea and vomiting.   Musculoskeletal: Negative for arthralgias, gait problem, myalgias, neck pain and neck stiffness.   Skin: Positive for wound. Negative for rash.        Superficial lacerations on the right forearm-he used a .   Allergic/Immunologic: Negative for immunocompromised state.   Neurological: Negative for dizziness, seizures, syncope and headaches.   Hematological: Does not bruise/bleed easily.   Psychiatric/Behavioral: Positive for agitation, behavioral problems, dysphoric mood, self-injury, sleep disturbance and suicidal ideas. Negative for hallucinations. The patient is nervous/anxious.        Physical Exam   BP: 129/73  Pulse: 59  Heart Rate: 97  Temp: 97.6  F (36.4  C)  Resp: 16  SpO2: 94 %      Physical Exam   Constitutional: He is oriented to person, place, and time. He appears well-developed and well-nourished. No distress.   HENT:   Head: Normocephalic and atraumatic.   Mouth/Throat: Oropharynx is clear and moist.   Eyes: Conjunctivae and EOM are normal.   Neck: Normal range of motion. Neck supple.   Cardiovascular: Normal rate, regular rhythm, normal heart sounds and intact distal pulses.    Pulmonary/Chest: Effort normal and breath sounds normal. No respiratory distress.   Abdominal: Soft. There is no tenderness.   Musculoskeletal: He exhibits no edema or tenderness.   Neurological: He is alert and oriented to person, place, and time.   Skin: Skin is warm and dry. No rash noted.   Superficial lacerations on right forearm   Psychiatric: His mood appears anxious. His affect is blunt and labile. He is withdrawn. He is not agitated and not actively hallucinating. Thought content is not paranoid. Cognition and memory are impaired. He expresses inappropriate judgment. He exhibits a depressed mood. He expresses suicidal ideation. He expresses no homicidal ideation. He expresses suicidal plans. He is noncommunicative.   Nursing note and vitals reviewed.      ED Course      ED Course     Procedures             Critical Care time:  none             Labs Ordered and Resulted from Time of ED Arrival Up to the Time of Departure from the ED   ALCOHOL BREATH TEST POCT - Abnormal; Notable for the following:        Result Value    Alcohol Breath Test 0.063 (*)     All other components within normal limits   DRUG ABUSE SCREEN 6 CHEM DEP URINE ([facility])            Assessments & Plan (with Medical Decision Making)   Suicidal ideation, self injuring, limited intellectual ability. He became agitated when told he needs to be admitted and became physically aggressive with ED staff. He was given olanzapine and quickly calmed down. He did need to be physically restrained by security at this point to protect staff from injury. He rapidly became compliant. He was initially telling us that he had no suicidal thoughts but when his mother arrived and provided additional information it became clear that he needed admission for safe environment to fully evaluate and treat. This is needed to protect both him and others. He requires emergency admission. See also mental health  note. There is no evidence of harm from the brief physical restraint by security or the medication given.    I have reviewed the nursing notes.    I have reviewed the findings, diagnosis, plan and need for follow up with the patient.    New Prescriptions    No medications on file       Final diagnoses:   Suicidal ideation   Self-inflicted injury     IMiller, am serving as a trained medical scribe to document services personally performed by Narciso Marin MD, based on the provider's statements to me.   Narciso LÓPEZ MD, was physically present and have reviewed and verified the accuracy of this note documented by Miller Hurst.    6/7/2018   [facility], [place], EMERGENCY DEPARTMENT     Narciso Marin MD  06/08/18 [id number]

## 2018-06-08 NOTE — H&P
"Combined H&P/ Psychiatric Discharge Summary    Kimo Greenwood MRN# [id number]   Age: 37 year old YOB: 1980     Date of Admission:  6/7/2018  Date of Discharge:  6/8/2018  Admitting Physician:  Stephanie Meza MD  Discharge Physician:  Stephanie Meza MD (Contact: [phone])         Event Leading to Hospitalization:   Per DEC assessment by Clementine Tee dated 6/7/2018:  The patient is a 37-year-old male with a history of depression and intellectual disability (IQ is 50) who is brought to [facility] emergency department via EMS for evaluation of cutting and suicidal ideation.    The patient's mother reports that tonight the patient was depressed and called his ILS worker and said he had a gun to his head.  The patient does not have a gun.  He was drinking alcohol, took 2 pills of nabumetone (prescribed only 1), and cut himself on his arm.  Mother believes this was a suicide attempt.  The patient states he got confused about the number of pills he was prescribed and states he cut \"to feel pain.\"  The patient denies that this was a suicide attempt.  The patient currently denies suicidal ideation, intent, or plan.  Mother states the patient has a history of paranoia that has been worsening.  She states he has been thinking that people in public or talking about him and thought the  thought he was a rapist.  Patient has a history of using cannabis.  He reports he has not used for 2 weeks.  Mother believes his use is more recent.  Mother scheduled him to do a rule 25 a couple months ago.  They recommended inpatient treatment at [facility] but his insurance denied payment and recommended outpatient.  Mother recently found he was using cannabis again and scheduled him for another rule 25 today but the clinic had to cancel.  Then, mother took him to a mental health center today, he walked in, got angry and walked out.  Currently, the patient " does not have a psychiatrist or therapist.  Mother has him scheduled with a new therapist on Monday but is concerned he will refuse to go.  Patient states that he will go.  Mother believes he needs DBT and CD treatment.  The patient lives with his mother and stepfather.  He got a job a few months ago and then failed a drug test and was fired.    Plan: The patient denies suicidal or homicidal ideation, intent, or plan, but mother does not believe he is being truthful and believes he attempted suicide tonight.  Collaborated with MD Yola.  Because patient's mother is the legal guardian be spoke with her and explore the option for discharge as the patient was calm, future thinking, and contracted for safety.  Provided education about outpatient options such as available appointments for psychiatry, therapy, or rule 25.  However, mother reported feeling upset that discharge is being considered, used several swear words, and stated that she would contact administration of the complaint, but stated she would come to  the patient.  Attempted to call her back several times but she did not answer and voice mailbox was full.  Other arrived in the ED.  Admission was offered as that was mother's request.  Mother declined that request stating she would prefer to take him to a different hospital.  MD went to speak with mother and the patient became aggressive towards a staff member.  At that time MD put him on a 72 hour hold with plan for admission.  Mother was leaving she stated she would give consent for admission.              Diagnoses:     MDD, recurrent, mild to moderate  R/O schizoaffective disorder  Moderate intellectual disability with IQ of 50         Psychiatric Review of Systems:   Depression:   Reports: depressed mood, impaired concentration, decreased energy, impaired concentration, irritability.   Denies: decreased interest, changes in sleep, changes in appetite, guilt, hopelessness, helplessness,    "  Marie:   Reports:   Denies: sleeplessness, impulsiveness, racing thoughts  Psychosis:   Reports: paranoia  Denies: visual hallucinations, auditory hallucinations, grandiosity, ideas of reference, thought insertions, thought broadcasting.  Anxiety:   Denies: worries, panic attacks, specific phobias.    PTSD:   Denies: Hx of trauma  OCD:   Denies: obsessions, checking, symmetry, cleaning, skin picking.  ED:   Denies: restriction, binging, purging.           Medical Review of Systems:     Review of systems positive for NONE  10 point review of systems is otherwise negative unless noted above.            Psychiatric History:   Past diagnoses: MDD, moderate intellectual disability  Psychiatric Hospitalizations: 2 previous hospitalizations, most recently on station 10 at [facility]  History of Psychosis: None  Prior ECT: None  Court Commitment: None  Suicide Attempts: None  Self-injurious Behavior: Remote history of SIB until yesterday. Cut himself superficially with a  in context of acute alcohol intoxication  Violence toward others: No history of violent behaviors toward others though he apparently pushed a staff member prior to admission after being informed of the 72 hr hold  Use of Psychotropics: Abilify, Prozac per chart review         Substance Use History:   Alcohol: Patient states that he drank a few shots of vodka on the night of admission.  He states that he drinks alcohol once per month, and does not typically drink in excess.  Mother agreed with this.  Cannabis:  patient states that he uses \"a small bag of marijuana\" approximately 1/month.  States his last use of marijuana was 1 month ago.  Mother believes he is using marijuana more frequently than this.  Nicotine: Daily smoker  Stimulants: none  Diet Pills: none  Opium/Morphine/Narcotics: none  Sedatives: none  Hallucinogens: none  Inhalants: none  Other: none    Prior Chemical Dependency treatment: none          Social History:   Educational " History: High school graduate, completed some community college courses  Relationships: Single  Children: None  Current Living Situation: Patient lives with his mother and  stepfather  Occupational History: Recently fired from his job at [employer]  Financial Support: Parents, SSDI  Legal History: None  Abuse History: None         Family History:   The patient's mother has had problems with depression  H/o completed suicides in family: None per chart review         Past Medical History:     Past Medical History:   Diagnosis Date     Hypertension      Leukocytosis 6/9/2014     Marijuana abuse      MDD (major depressive disorder)      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    With associated speech/language delay     Obesity 11/11/2010     LOREN (obstructive sleep apnea)      Other chronic pain     left hip/leg pain     Seborrheic dermatitis      Uncomplicated asthma        History of seizures: None  History of Hepatitis, Head Trauma with or without loss of consciousness: Unknown         Past Surgical History:     Past Surgical History:   Procedure Laterality Date     ARTHROPLASTY HIP Left 1/25/2017    Procedure: ARTHROPLASTY HIP;  Surgeon: Bala Randall MD;  Location: [facility] OR     COLONOSCOPY       COLONOSCOPY N/A 10/30/2015    Procedure: COMBINED COLONOSCOPY, SINGLE OR MULTIPLE BIOPSY/POLYPECTOMY BY BIOPSY;  Surgeon: Alexis Jackson MD;  Location:  GI     COLONOSCOPY N/A 11/17/2016    Procedure: COMBINED COLONOSCOPY, SINGLE OR MULTIPLE BIOPSY/POLYPECTOMY BY BIOPSY;  Surgeon: Cat Huber MD;  Location:  GI     ESOPHAGOSCOPY, GASTROSCOPY, DUODENOSCOPY (EGD), COMBINED N/A 11/17/2016    Procedure: COMBINED ESOPHAGOSCOPY, GASTROSCOPY, DUODENOSCOPY (EGD), BIOPSY SINGLE OR MULTIPLE;  Surgeon: Cat Huber MD;  Location: [facility]     EXAM UNDER ANESTHESIA, FULGURATE CONDYLOMA ANUS, COMBINED N/A 12/22/2016    Procedure: COMBINED EXAM UNDER ANESTHESIA, FULGURATE  "CONDYLOMA ANUS;  Surgeon: Nya Cabello MD;  Location: [facility] OR     GENITOURINARY SURGERY      bladder, [facility], [facility],Interstem stimulator implant     ORTHOPEDIC SURGERY                Allergies:      Allergies   Allergen Reactions     No Clinical Screening - See Comments Other (See Comments)     Awoke from anesthesia with anger and ripping off dressing, after interstim placement, outside hospital 2013            Labs:     Recent Results (from the past 168 hour(s))   Alcohol breath test POCT    Collection Time: 06/08/18 12:13 AM   Result Value Ref Range    Alcohol Breath Test 0.063 (A) 0.00 - 0.01          Consults:   No consultations were requested during this admission           Physical Exam:   Please refer to physical exam completed by ED provider, Narciso Owens MD, on 6/7/18. I agree with the findings and assessment and have no additional findings to add at this time.            Hospital Course:   Kimo Greenwood was admitted to Station 12 with attending Stephanie Meza MD on a 72 hour mental health hold. The patient was placed under status 15 (15 minute checks) to ensure patient safety.     On admission, the patient was calm and cooperative.  During our interview, the patient reported feeling \"down and depressed\" prior to hospitalization.  However, he notes significant improvement in his mood today.  When asked about events leading to hospitalization, he states that the pain medication (nabumetone) was prescribed approximately 1 month ago for arthritic pain.  He stated that since that time, he has been taking approximately 1 pill per day.  Since it was started, he notes a decline in his mood.  He states that on the day prior to admission, he began drinking excessive amounts of alcohol at approximately 7 PM, and took 2 nabumetone pills at once.  He notes that after drinking two-point of intoxication, he developed suicidal thoughts.  When asked to describe the thoughts, he said \"I felt like I " "did not want to be here.\"  He adamantly denies any plan or intent.  He attributes improvement to sobriety and discontinuation of pain medication.  He notes that he \"dumped the rest of them in the toilet because I do not want to take them anymore.\"  He also notes that he engaged in self-harm yesterday by scratching himself on his arm with a .  He acknowledges that he would not have engaged in this behavior if he had not been intoxicated.  He notes that prior to yesterday, he has not engaged in self-harm \"for years.\"  Patient also reported increased paranoia, stating that he is concerned that other people are frequently talking about him, though demonstrates insight as he then stated \"I know that is not true though.\"  He denies psychotic or manic symptoms.  He denies SI.  He denies history of suicide attempts.  He states that he is uncomfortable in the hospital, and requests discharge at this time.    I spoke with patient's mother and guardian at length to provide update regarding patient's condition and to address any questions or concerns.  Mother notes that patient's depressive symptoms have become \"progressively worse\" over the past month.  When asked about triggers, she mentions that she has recently been restricting his access to his car and his phone because he was \"recently hanging out with 2 drug dealers who are using him for rides.\"  She states that she had to change his phone number due to safety concerns.  She acknowledges that since that time, he has been more isolative at home and does not have any structure or routine in his daily life.  She has been encouraging him to attend outpatient programs, though patient has been very reluctant to do so due to his paranoia and anxiety.  Mother initially requested ongoing hospitalization, though when I asked her if she would be willing to sign the patient in on a voluntary basis given her request for ongoing hospitalization, she said that she would like " "him to be discharged so that he can attend a therapy session on Monday \"with a specialist who can really help us and we have been waiting to see her for more than a month.\"  Based on his degree of symptoms, I recommended a dose increase in both his Prozac (to target depressive symptoms) and Abilify (for augmentation of depression and to target paranoia).  I also strongly recommended that patient engage in a DBT program given his maladaptive coping strategies and emotional dysregulation.  Mother denied any imminent safety concerns and was in agreement with our plan.  She said that she felt much more comfortable having patient go home in the context of these medication changes and with a plan to follow-up with a therapist on Monday. Mother also agreed to remove all alcohol from the home. She said that this was done.     I discussed the proposed medication changes with the patient, including an increase in Prozac to 60 mg daily and an increase in Abilify to 7.5 mg daily.  R/B/A were discussed with both patient and patient's guardian/mother.  He agreed with this plan.  He also acknowledged that recently he has felt more restricted at home given the lack of access to his mother's vehicle.  He is open to considering DVT or an intensive outpatient treatment program.     Kimo Greenwood did not participate in groups and was not visible in the milieu.     The patient's symptoms of depression improved. SI resolved when patient was sober.     # Discharge Pain Plan:  - During his hospitalization, Kimo experienced pain due to arthritic pain.  The pain plan for discharge was discussed with Kimo and the plan was created in a collaborative fashion.    - Pharmacologic adjuvants:  NSAIDs and Acetaminophen  - Non-pharmacologic adjuvants: Heat and Ice      Kimo Greenwood was released to home. At the time of discharge Kimo Greenwood was determined to not be a danger to himself or others.     SUICIDE RISK ASSESSMENT:  " Today Kimo Greenwood denies SI, SIB, and HI.  He has notable risk factors for self-harm including SIB, new/ worsening medical issue, recent inpt stay and male.  However, risk is mitigated by no h/o suicide attempt, no plan or intent, no access to lethal means, describes a safety plan, h/o seeking help when needed, symptom improvement, future oriented, commitment to family, good social support  , stable housing and participation in DBT or day program.  Based on all available evidence he does not appear to be at imminent risk for self-harm therefore does not meet criteria for ongoing 72-hr hold/ involuntary hospitalization. Mother was in agreement with discharge plan and did not wish to sign patient in on a voluntary basis. However, based on degree of symptoms therapy, DBT and close psych FU was recommended which the pt did agree to consider. Patient also agreed to call 911/present to ED if any imminent safety concerns arise, including re-emergence of SI. Patient was provided with crisis resources at the time of discharge. Patient agreed to further reduce risk of self-harm by completely abstaining from illicit substances and alcohol, and agreed to remain medication adherent. Expressed understanding of the risks associated with alcohol use, illicit drug use, and medication non-adherence.           Discharge Medications:     Current Discharge Medication List      CONTINUE these medications which have CHANGED    Details   ARIPiprazole (ABILIFY) 5 MG tablet Take 1.5 tablets (7.5 mg) by mouth every morning  Qty: 45 tablet, Refills: 1    Associated Diagnoses: Major depressive disorder, recurrent episode, moderate (H)      FLUoxetine (PROZAC) 20 MG capsule Take 3 capsules (60 mg) by mouth daily  Qty: 90 capsule, Refills: 1    Associated Diagnoses: Moderate major depression (H)         CONTINUE these medications which have NOT CHANGED    Details   cholecalciferol 2000 UNITS CAPS Take 1 capsule by mouth daily  Qty: 30  capsule, Refills: 11    Associated Diagnoses: Vitamin D deficiency      diclofenac (VOLTAREN) 1 % GEL topical gel Place 2-4 g onto the skin 4 times daily as needed for moderate pain  Qty: 100 g, Refills: 4    Associated Diagnoses: Chronic lumbosacral pain; Knee pain, unspecified chronicity, unspecified laterality      fluticasone (FLONASE) 50 MCG/ACT spray Spray 2 sprays into both nostrils every morning      hydrocortisone 2.5 % cream Apply topically 2 times daily Apply sparing to face for 1-2 weeks.  Qty: 30 g, Refills: 2    Associated Diagnoses: Seborrheic dermatitis      ketoconazole (NIZORAL) 2 % shampoo Apply to the scalp and beard two times per week and wash off after 5 minutes.  Qty: 120 mL, Refills: 11    Associated Diagnoses: Seborrheic dermatitis      lisinopril (PRINIVIL/ZESTRIL) 10 MG tablet Take 1 tablet (10 mg) by mouth every morning  Qty: 90 tablet, Refills: 3    Associated Diagnoses: Hypertension goal BP (blood pressure) < 140/90      mometasone-formoterol (DULERA) 100-5 MCG/ACT oral inhaler Inhale 2 puffs into the lungs 2 times daily  Qty: 1 Inhaler, Refills: 11    Associated Diagnoses: Mild intermittent asthma without complication      ondansetron (ZOFRAN ODT) 4 MG disintegrating tablet Take 1 tablet (4 mg) by mouth every 8 hours as needed for nausea  Qty: 15 tablet, Refills: 0      !! order for DME Equipment being ordered: CPAP with mask and tubing  Qty: 1 Units, Refills: 0    Associated Diagnoses: LOREN (obstructive sleep apnea)      !! order for DME Equipment being ordered: CPAP mask and tubing  Qty: 1 Units, Refills: 0    Associated Diagnoses: LOREN (obstructive sleep apnea)      !! order for DME Equipment being ordered: pull ups  Qty: 30 each, Refills: 6    Associated Diagnoses: Nocturnal enuresis      !! ORDER FOR DME auto CPAP 7-15 cm/h20  Qty: 1 each, Refills: 0    Associated Diagnoses: Obstructive sleep apnea (adult) (pediatric)      pantoprazole (PROTONIX) 40 MG EC tablet Take 1 tablet (40  mg) by mouth daily Take 30-60 minutes before first meal of the day  Qty: 90 tablet, Refills: 3    Associated Diagnoses: Duodenitis; LPRD (laryngopharyngeal reflux disease)      psyllium 0.52 G capsule Take 1 capsule (0.52 g) by mouth daily  Qty: 180 capsule, Refills: 3    Associated Diagnoses: Slow transit constipation      VENTOLIN  (90 BASE) MCG/ACT Inhaler INHALE 2 PUFFS INTO LUNGS EVERY SIX HOURS AS NEEDED FOR SHORTNESS OF BREATH / DYSPNEA OR WHEEZING  Qty: 1 Inhaler, Refills: 0    Comments: Medication is being filled for 1 time refill only due to:  Patient needs to be seen because needs OV.  Associated Diagnoses: Mild intermittent asthma without complication       !! - Potential duplicate medications found. Please discuss with provider.      STOP taking these medications       aspirin  MG EC tablet Comments:   Reason for Stopping:         nabumetone (RELAFEN) 750 MG tablet Comments:   Reason for Stopping:                  Psychiatric Examination:   Appearance:  awake, alert and adequately groomed  Attitude:  cooperative  Eye Contact:  good  Mood:  better  Affect:  mood congruent, intensity is blunted and constricted mobility  Speech:  clear, coherent  Psychomotor Behavior:  no evidence of tardive dyskinesia, dystonia, or tics  Thought Process:  logical, linear, goal oriented and concrete  Associations:  no loose associations  Thought Content:  no evidence of suicidal ideation or homicidal ideation and no evidence of psychotic thought  Insight:  fair  Judgment:  fair  Oriented to:  time, person, and place  Attention Span and Concentration:  intact  Recent and Remote Memory:  intact  Language: intact  Fund of Knowledge: delayed  Muscle Strength and Tone: normal  Gait and Station: Normal         Discharge Plan:   Health Care Follow-up Appointments:   - Therapy: You have an appointment on Monday with a new Therapist - please follow up with them.       - [facility] :  Danna  Harish  Phone: [phone]      - ILS Worker- pt unable to tell me name of ILS worker- but states he does have one.      Additional Resources:   - DBT Program Options  [facility]   [address]  [address]  [place] [address]  Phone: [phone]  Fax: [phone]     - [facility]  [address]  [facility] [address]  Phone: [phone]  Fax: [phone]     Attend all scheduled appointments with your outpatient providers. Call at least 24 hours in advance if you need to reschedule an appointment to ensure continued access to your outpatient providers.   Major Treatments, Procedures and Findings:  You were provided with: a psychiatric assessment, medication evaluation and/or management, family therapy and milieu management     Symptoms to Report: feeling more aggressive, increased confusion, losing more sleep, mood getting worse or thoughts of suicide     Early warning signs can include: increased depression or anxiety sleep disturbances increased thoughts or behaviors of suicide or self-harm  increased unusual thinking, such as paranoia or hearing voices     Safety and Wellness:  Take all medicines as directed.  Make no changes unless your doctor suggests them.      Follow treatment recommendations.  Refrain from alcohol and non-prescribed drugs.  If there is a concern for safety, call [phone].     Resources:   Crisis Intervention: 347.974.7665 or 376-140-1175 (TTY: 430.636.4396).  Call anytime for help.  National [place] on Mental Illness (www.[place].jeniffer.org): 547.881.3779 or 679-115-5391.  [place] Association for Children's Mental Health (www.macmh.org): 902.630.1977.  Alcoholics Anonymous (www.alcoholics-anonymous.org): Check your phone book for your local chapter.  Suicide Awareness Voices of Education (SAVE) (www.save.org): 342-361-EMYF (0115)  National Suicide Prevention Line (www.mentalhealthmn.org): 041-368-PPZI (8771)  Mental Health  "Consumer/Survivor Network of [place] (www.mhcsn.net): 563-433-9466 or 393-174-9241  Mental Health Association of [place] (www.mentalhealth.org): 845.358.4037 or 937-575-0660  Self- Management and Recovery Training., SMART-- Toll free: 219.207.8790  www.[employer]  [facility] Crisis (COPE) Response - Adult 405 271-8179  [facility] Crisis Response - Adult 030 632-8347  Text 4 Life: txt \"LIFE\" to 12566 for immediate support and crisis intervention  Crisis text line: Text \"[place]\" to 385326. Free, confidential, 24/7.  Crisis Intervention: 370.826.6833 or 629-924-3442. Call anytime for help.   [facility] Crisis Team - Child: [phone]  [facility] Crisis Response Team - Child: [phone]     The treatment team has appreciated the opportunity to work with you.  If you have any questions or concerns our unit number is [phone].     Attestation:  The patient has been seen and evaluated by me,  Stephanie Meza MD      "

## 2018-06-08 NOTE — PROGRESS NOTES
"Pt's mother, who is also his guardian called. She stated patient has been \"severly depressed\" and that he will likely deny or minimize this. Patient lives with his mother. Mother is hopeful for patient to be able to attend an appointment with a new therapist on Monday. She also verbalizes that she does not want patient \"over medicated\".   Dalia Ortega  [phone]  "

## 2018-06-08 NOTE — ED NOTES
ED to Behavioral Floor Handoff    SITUATION  Kimo Greenwood is a 37 year old male who speaks English and lives in a home with family members The patient arrived in the ED by ambulance from home with a complaint of Suicidal (LIves with mother who is legal guardian; pt has been depressed lately, took his relafen tonight with 3 shots of vodka; pt denies it was an attempt to kill himself--just has been depressed and wanted to just sleep it off. MOther tried to wake him up and got no response so she called 911. Patient Stated he was just ignoring his mother.)  .The patient's current symptoms started/worsened 1 day(s) ago and during this time the symptoms have remained the same.   In the ED, pt was diagnosed with   Final diagnoses:   Suicidal ideation   Self-inflicted injury        Initial vitals were: BP: 129/73  Heart Rate: 97  Temp: 97.6  F (36.4  C)  SpO2: 94 %   --------  Is the patient diabetic? No   If yes, last blood glucose? --     If yes, was this treated in the ED? --  --------  Is the patient inebriated (ETOH) No or Impaired on other substances? No  MSSA done? N/A  Last MSSA score: --    Were withdrawal symptoms treated? N/A  Does the patient have a seizure history? No. If yes, date of most recent seizure--  --------  Is the patient patient experiencing suicidal ideation? denies current or recent suicidal ideation     Homicidal ideation? denies current or recent homicidal ideation or behaviors.    Self-injurious behavior/urges? reports current or recent self injurious behavior or ideation including cutting . .  ------  Was pt aggressive in the ED Yes, he attempted to leave unit when told he was getting admitted.   Was a code called Yes  Is the pt now cooperative? Yes  -------  Meds given in ED:   Medications   OLANZapine (zyPREXA) injection 10 mg (10 mg Intramuscular Given 6/8/18 0133)      Family present during ED course? Yes  Family currently present? No    BACKGROUND  Does the patient have a cognitive  "impairment or developmental disability? Yes  Allergies:   Allergies   Allergen Reactions     No Clinical Screening - See Comments Other (See Comments)     Awoke from anesthesia with anger and ripping off dressing, after interstim placement, outside hospital 2013   .   Social demographics are   Social History     Social History     Marital status: Single     Spouse name: N/A     Number of children: N/A     Years of education: N/A     Social History Main Topics     Smoking status: Former Smoker     Packs/day: 0.50     Years: 1.00     Types: Cigarettes     Smokeless tobacco: Never Used      Comment: Smokes E Cigs equal to 1 PPD     Alcohol use Yes      Comment: socially--\"not very often\" \"once a month\"     Drug use: No      Comment: patient denies any marijuana use     Sexual activity: Yes     Partners: Female     Birth control/ protection: Condom     Other Topics Concern     Parent/Sibling W/ Cabg, Mi Or Angioplasty Before 65f 55m? No     Social History Narrative        ASSESSMENT  Labs results   Labs Ordered and Resulted from Time of ED Arrival Up to the Time of Departure from the ED   ALCOHOL BREATH TEST POCT - Abnormal; Notable for the following:        Result Value    Alcohol Breath Test 0.063 (*)     All other components within normal limits   DRUG ABUSE SCREEN 6 CHEM DEP URINE ([facility])      Imaging Studies: No results found for this or any previous visit (from the past 24 hour(s)).   Most recent vital signs /73  Temp 97.6  F (36.4  C) (Oral)  SpO2 94%   Abnormal labs/tests/findings requiring intervention:---   Pain control: pt had none  Nausea control: pt had none    RECOMMENDATION  Are any infection precautions needed (MRSA, VRE, etc.)? No If yes, what infection? --  ---  Does the patient have mobility issues? independently. If yes, what device does the pt use? ---  ---  Is patient on 72 hour hold or commitment? Yes If on 72 hour hold, have hold and rights been given to patient? Yes  Are admitting orders " written if after 10 p.m. ?Yes  Tasks needing to be completed: Please collect urine. Anticipate needs. Thank you.      Leslie Walters   [facility]-- [phone] West ED   [phone] East ED

## 2018-06-08 NOTE — PLAN OF CARE
Problem: General Plan of Care (Inpatient Behavioral)  Goal: Team Discussion  Team Plan:  BEHAVIORAL TEAM DISCUSSION    Participants: Dr. Stephanie Meza, Abraham Mahmood RN, Yajaira Morris RN, CHARANJIT- Chen Herrera LMFT, [facility]   Progress: continue to assess, pt just admitted  Continued Stay Criteria/Rationale: Pt requires inpatient stabilization of MH symptoms, 72 hr hold  Medical/Physical: see medical chart  Precautions:   Behavioral Orders   Procedures     Assault precautions     Code 1 - Restrict to Unit     Routine Programming     As clinically indicated     Self Injury Precaution     Status 15     Every 15 minutes.     Plan: conduct initial assessment, meet with Psychiatrist and treatment team  Rationale for change in precautions or plan: no change at this time

## 2018-06-08 NOTE — PROGRESS NOTES
Patient admitted from the [facility] ED on a 72 hour. Upon arrival to the unit, patient is calm and cooperative with search and vitals. Patient is oriented to the unit, showed his room, and breakfast ordered. Denies SI/SIB. Denies allergies. MD notified of admission.

## 2018-06-08 NOTE — ED NOTES
Per security, patient refused to put on scrubs after search and was able to walk out the ED door to the [facility] statue out front. Patient was escorted back to ED by security without any problems. Placed in room 14.

## 2018-06-08 NOTE — ED NOTES
Patient advised of need for security search and asked to change into scrubs. Patient did not decline and went with security for safety search and presumably to change into scrubs.

## 2018-06-08 NOTE — PROGRESS NOTES
06/08/18 0843   Patient Belongings   Did you bring any home meds/supplements to the hospital?  No   Patient Belongings cell phone/electronics;clothing;shoes   Disposition of Belongings Locker   Belongings Search Yes   Clothing Search Yes   Second Staff Abraham S     - Belt  - Black t-shirt  - pair of sketchers  - Short pant  - black socks  - Samsung Cell phone    A               Admission:  I am responsible for any personal items that are not sent to the safe or pharmacy.  [facility] is not responsible for loss, theft or damage of any property in my possession.    Signature:  _________________________________ Date: _______  Time: _____                                              Staff Signature:  ____________________________ Date: ________  Time: _____      2nd Staff person, if patient is unable/unwilling to sign:    Signature: ________________________________ Date: ________  Time: _____     Discharge:  [facility] has returned all of my personal belongings:    Signature: _________________________________ Date: ________  Time: _____                                          Staff Signature:  ____________________________ Date: ________  Time: _____

## 2018-06-08 NOTE — DISCHARGE INSTRUCTIONS
Behavioral Discharge Planning and Instructions      Summary:  You were admitted on 6/7/2018  due to Suicidal Ideations and Self Injurious Behaviors.  You were treated by Dr. 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     Health Care Follow-up Appointments:   - Therapy: You have an appointment on Monday with a new Therapist - please follow up with them.      - [facility] :  Danna Moreira  Phone: [phone]     - ILS Worker- pt unable to tell me name of ILS worker- but states he does have one.     Additional Resources:   - DBT Program Options  [facility]   [address]  [address]  [place] [address]  Phone: [phone]  Fax: [phone]    - [facility]  [address], [address]  [facility] [address]  Phone: [phone]  Fax: [phone]    Attend all scheduled appointments with your outpatient providers. Call at least 24 hours in advance if you need to reschedule an appointment to ensure continued access to your outpatient providers.   Major Treatments, Procedures and Findings:  You were provided with: a psychiatric assessment, medication evaluation and/or management, family therapy and milieu management    Symptoms to Report: feeling more aggressive, increased confusion, losing more sleep, mood getting worse or thoughts of suicide    Early warning signs can include: increased depression or anxiety sleep disturbances increased thoughts or behaviors of suicide or self-harm  increased unusual thinking, such as paranoia or hearing voices    Safety and Wellness:  Take all medicines as directed.  Make no changes unless your doctor suggests them.      Follow treatment recommendations.  Refrain from alcohol and non-prescribed drugs.  If there is a concern for  "safety, call 911.    Resources:   Crisis Intervention: 969.870.8127 or 020-443-9258 (TTY: 169.823.8905).  Call anytime for help.  National [place] on Mental Illness (www.[place].jeniffer.org): 468.413.9271 or 751-278-8893.  [place] Association for Children's Mental Health (www.[facility].org): 944.435.1832.  Alcoholics Anonymous (www.alcoholics-anonymous.org): Check your phone book for your local chapter.  Suicide Awareness Voices of Education (SAVE) (www.save.org): 143-155-CVFB (7931)  National Suicide Prevention Line (www.mentalhealthmn.org): 622-908-UKSW (6766)  Mental Health Consumer/Survivor Network of [place] (www.mhcsn.net): 949.171.8850 or 384-240-3318  Mental Health Association of [place] (www.mentalhealth.org): 627.734.4151 or 610-760-4856  Self- Management and Recovery Training., SMART-- Toll free: 265.992.1376  www.[employer].org  [facility] Crisis (COPE) Response - Adult 304 625-8422  [facility] Crisis Response - Adult 690 426-9803  Text 4 Life: txt \"LIFE\" to 94213 for immediate support and crisis intervention  Crisis text line: Text \"[place]\" to 552982. Free, confidential, 24/7.  Crisis Intervention: 401.491.9664 or 631-833-2030. Call anytime for help.   [facility] Crisis Team - Child: [phone]  [facility] Mental Health Crisis Response Team - Child: [phone]    The treatment team has appreciated the opportunity to work with you.  If you have any questions or concerns our unit number is [phone].      "

## 2018-06-08 NOTE — ED NOTES
Bed: HW05  Expected date:   Expected time:   Means of arrival:   Comments:  Maddie  37M   Mental Health  ETA 22:45

## 2018-06-08 NOTE — PLAN OF CARE
Problem: General Plan of Care (Inpatient Behavioral)  Goal: Individualization/Patient Specific Goal (IP Behavioral)  The patient and/or their representative will achieve their patient-specific goals related to the plan of care.    The patient-specific goals include:   Personal Plan of Care    Reasons you are in the Hospital  1. Self-injury  2. Suicidal ideation   3. Substance abuse  4.    Goals for Discharge   1. Talk to doctor  2. meds   3.    Illness Management Recovery model: Personal Plan of Care    Patient completed Personal Plan of Care, identifying reasons for hospitalization and goals for discharge. Form reviewed in team meeting and signed by patient, physician, writer and RN. Form given to [facility] to be scanned into [employer].

## 2018-06-11 ENCOUNTER — MYC MEDICAL ADVICE (OUTPATIENT)
Dept: FAMILY MEDICINE | Facility: CLINIC | Age: 38
End: 2018-06-11

## 2018-06-11 DIAGNOSIS — F32.1 MODERATE MAJOR DEPRESSION (H): ICD-10-CM

## 2018-06-11 DIAGNOSIS — F33.1 MAJOR DEPRESSIVE DISORDER, RECURRENT EPISODE, MODERATE (H): ICD-10-CM

## 2018-06-14 NOTE — TELEPHONE ENCOUNTER
Mother called back.  She already did pickup up the 90 from [facility].  For the next month she would like to get this send to:      [facility] [place], [place] - [address]    So that they are included in patients bubble packs.

## 2018-06-14 NOTE — TELEPHONE ENCOUNTER
Pt as had dosing change with the fluoxetine and Abilify after recent hospital stay.    Fluoxetine was 40 mg now 60 mg   Abilify was 5 mg now 7.5 mg     Writer did advise to schedule f/u with PCP.     They did  RX of these med's upon discharge but want RX sent to mail order for future refills -  See my chart.    Eliana Concepcion RN

## 2018-06-15 RX ORDER — ARIPIPRAZOLE 5 MG/1
7.5 TABLET ORAL EVERY MORNING
Qty: 45 TABLET | Refills: 1 | Status: SHIPPED | OUTPATIENT
Start: 2018-06-15 | End: 2020-06-02

## 2018-06-15 NOTE — TELEPHONE ENCOUNTER
Sent to pharmacy.  Just sent next 2 months to make sure there are not further changes and that he is stable on current regimen.

## 2018-06-28 ENCOUNTER — MYC MEDICAL ADVICE (OUTPATIENT)
Dept: FAMILY MEDICINE | Facility: CLINIC | Age: 38
End: 2018-06-28

## 2018-07-03 ENCOUNTER — TRANSFERRED RECORDS (OUTPATIENT)
Dept: HEALTH INFORMATION MANAGEMENT | Facility: CLINIC | Age: 38
End: 2018-07-03

## 2018-07-06 NOTE — PROGRESS NOTES
Addendum  created 07/06/18 1319 by Pascual Fuller APRN CRNA    Anesthesia Staff edited       [place] result:  Lab Results   Component Value Date    PPDREDNESS 0 02/08/2018    PPDINDURATIO 0 02/08/2018     Anupama Ross RN, BSN

## 2018-07-07 ENCOUNTER — TELEPHONE (OUTPATIENT)
Dept: FAMILY MEDICINE | Facility: CLINIC | Age: 38
End: 2018-07-07

## 2018-07-07 NOTE — TELEPHONE ENCOUNTER
Parent Belen calls [facility] on file, reports Willard has arthritis. C/O bilateral ankle discomfort and swelling. Following at [facility].   We do not see arthritis or ankle diagnoses in problem list or in visit diagnoses.  Asks what to give for pain. Advised to call [facility].  If their staff is not available for advise today she will bring Willard to .   Mom later said there is not a formal diagnosis of arthritis from [facility]. Advised UC.  LV open until 5 PM today.  Caller agrees to plan.   Pedro Pablo Dan RN

## 2018-07-11 ENCOUNTER — TRANSFERRED RECORDS (OUTPATIENT)
Dept: HEALTH INFORMATION MANAGEMENT | Facility: CLINIC | Age: 38
End: 2018-07-11

## 2018-07-13 ENCOUNTER — OFFICE VISIT (OUTPATIENT)
Dept: FAMILY MEDICINE | Facility: CLINIC | Age: 38
End: 2018-07-13
Payer: COMMERCIAL

## 2018-07-13 VITALS
SYSTOLIC BLOOD PRESSURE: 130 MMHG | TEMPERATURE: 98 F | DIASTOLIC BLOOD PRESSURE: 70 MMHG | OXYGEN SATURATION: 96 % | WEIGHT: 315 LBS | HEIGHT: 71 IN | HEART RATE: 77 BPM | BODY MASS INDEX: 44.1 KG/M2

## 2018-07-13 DIAGNOSIS — R60.0 PERIPHERAL EDEMA: Primary | ICD-10-CM

## 2018-07-13 DIAGNOSIS — E66.01 MORBID OBESITY (H): ICD-10-CM

## 2018-07-13 DIAGNOSIS — G47.33 OSA (OBSTRUCTIVE SLEEP APNEA): ICD-10-CM

## 2018-07-13 DIAGNOSIS — I10 HYPERTENSION GOAL BP (BLOOD PRESSURE) < 140/90: ICD-10-CM

## 2018-07-13 LAB
ALBUMIN SERPL-MCNC: 3.6 G/DL (ref 3.4–5)
ALP SERPL-CCNC: 64 U/L (ref 40–150)
ALT SERPL W P-5'-P-CCNC: 28 U/L (ref 0–70)
ANION GAP SERPL CALCULATED.3IONS-SCNC: 8 MMOL/L (ref 3–14)
AST SERPL W P-5'-P-CCNC: 15 U/L (ref 0–45)
BILIRUB SERPL-MCNC: 0.4 MG/DL (ref 0.2–1.3)
BUN SERPL-MCNC: 13 MG/DL (ref 7–30)
CALCIUM SERPL-MCNC: 8.6 MG/DL (ref 8.5–10.1)
CHLORIDE SERPL-SCNC: 108 MMOL/L (ref 94–109)
CO2 SERPL-SCNC: 24 MMOL/L (ref 20–32)
CREAT SERPL-MCNC: 0.8 MG/DL (ref 0.66–1.25)
ERYTHROCYTE [DISTWIDTH] IN BLOOD BY AUTOMATED COUNT: 13 % (ref 10–15)
GFR SERPL CREATININE-BSD FRML MDRD: >90 ML/MIN/1.7M2
GLUCOSE SERPL-MCNC: 88 MG/DL (ref 70–99)
HCT VFR BLD AUTO: 38.7 % (ref 40–53)
HGB BLD-MCNC: 13.1 G/DL (ref 13.3–17.7)
MCH RBC QN AUTO: 28.6 PG (ref 26.5–33)
MCHC RBC AUTO-ENTMCNC: 33.9 G/DL (ref 31.5–36.5)
MCV RBC AUTO: 85 FL (ref 78–100)
NT-PROBNP SERPL-MCNC: 49 PG/ML (ref 0–125)
PLATELET # BLD AUTO: 242 10E9/L (ref 150–450)
POTASSIUM SERPL-SCNC: 3.6 MMOL/L (ref 3.4–5.3)
PROT SERPL-MCNC: 7.1 G/DL (ref 6.8–8.8)
RBC # BLD AUTO: 4.58 10E12/L (ref 4.4–5.9)
SODIUM SERPL-SCNC: 140 MMOL/L (ref 133–144)
TSH SERPL DL<=0.005 MIU/L-ACNC: 2.86 MU/L (ref 0.4–4)
WBC # BLD AUTO: 8.3 10E9/L (ref 4–11)

## 2018-07-13 PROCEDURE — 84443 ASSAY THYROID STIM HORMONE: CPT | Performed by: FAMILY MEDICINE

## 2018-07-13 PROCEDURE — 83880 ASSAY OF NATRIURETIC PEPTIDE: CPT | Performed by: FAMILY MEDICINE

## 2018-07-13 PROCEDURE — 36415 COLL VENOUS BLD VENIPUNCTURE: CPT | Performed by: FAMILY MEDICINE

## 2018-07-13 PROCEDURE — 85027 COMPLETE CBC AUTOMATED: CPT | Performed by: FAMILY MEDICINE

## 2018-07-13 PROCEDURE — 80053 COMPREHEN METABOLIC PANEL: CPT | Performed by: FAMILY MEDICINE

## 2018-07-13 PROCEDURE — 99214 OFFICE O/P EST MOD 30 MIN: CPT | Performed by: FAMILY MEDICINE

## 2018-07-13 RX ORDER — HYDROCHLOROTHIAZIDE 12.5 MG/1
12.5 TABLET ORAL DAILY
Qty: 30 TABLET | Refills: 0 | Status: SHIPPED | OUTPATIENT
Start: 2018-07-13 | End: 2018-09-21

## 2018-07-13 NOTE — PROGRESS NOTES
SUBJECTIVE:   Kimo Greenwood is a 37 year old male who presents to clinic today for the following health issues:    Patient reports leg and feet swelling bilaterally for the past couple of weeks. He noticed dull chest pain two days ago, which lasted for one minute. Denies shortness of breath.     Patient with history of sleep apnea. He stopped wearing the CPAP because he is waiting for new CPAP equipment.     Patient with history of depression. He reports worsening mood. Patient is taking Abilify and Prozac. He attends counseling and is trying to get connected with a psychiatrist.      Problem list and histories reviewed & adjusted, as indicated.  Additional history: as documented    Patient Active Problem List   Diagnosis     Speech/language delay     Tobacco use disorder     Nocturnal enuresis     Moderate major depression (H)     LOREN (obstructive sleep apnea)     Insomnia     Hypertension goal BP (blood pressure) < 140/90     Morbid obesity (H)     Leukocytosis     Suicidal ideation     Chronic low back pain     Bilateral low back pain with left-sided sciatica     History of colonic polyps     Mild intellectual disability     S/P total hip arthroplasty     Vitamin D deficiency     LPRD (laryngopharyngeal reflux disease)     Patel's esophagus determined by biopsy     Mild intermittent asthma     Past Surgical History:   Procedure Laterality Date     ARTHROPLASTY HIP Left 1/25/2017    Procedure: ARTHROPLASTY HIP;  Surgeon: Bala Randall MD;  Location: [facility] OR     COLONOSCOPY       COLONOSCOPY N/A 10/30/2015    Procedure: COMBINED COLONOSCOPY, SINGLE OR MULTIPLE BIOPSY/POLYPECTOMY BY BIOPSY;  Surgeon: Alexis Jackson MD;  Location:  GI     COLONOSCOPY N/A 11/17/2016    Procedure: COMBINED COLONOSCOPY, SINGLE OR MULTIPLE BIOPSY/POLYPECTOMY BY BIOPSY;  Surgeon: Cat Huber MD;  Location:  GI     ESOPHAGOSCOPY, GASTROSCOPY, DUODENOSCOPY (EGD), COMBINED N/A 11/17/2016     "Procedure: COMBINED ESOPHAGOSCOPY, GASTROSCOPY, DUODENOSCOPY (EGD), BIOPSY SINGLE OR MULTIPLE;  Surgeon: Cat Huber MD;  Location: [facility] GI     EXAM UNDER ANESTHESIA, FULGURATE CONDYLOMA ANUS, COMBINED N/A 12/22/2016    Procedure: COMBINED EXAM UNDER ANESTHESIA, FULGURATE CONDYLOMA ANUS;  Surgeon: Nya Cabello MD;  Location: [facility] OR     GENITOURINARY SURGERY      bladder, Ibar[facility], [facility],Interstem stimulator implant     ORTHOPEDIC SURGERY         Social History   Substance Use Topics     Smoking status: Former Smoker     Packs/day: 0.50     Years: 1.00     Types: Cigarettes     Smokeless tobacco: Never Used      Comment: Smokes E Cigs equal to 1 PPD     Alcohol use Yes      Comment: socially--\"not very often\" \"once a month\"     Family History   Problem Relation Age of Onset     Anxiety Disorder Mother      Depression Mother      Ulcerative Colitis Mother 18     Breast Cancer Maternal Grandmother      Cerebrovascular Disease Maternal Grandmother      Breast Cancer Maternal Aunt      Cancer Maternal Grandfather      grt uncles colon cancer           Reviewed and updated as needed this visit by clinical staff  Tobacco  Allergies  Meds  Med Hx  Surg Hx  Fam Hx  Soc Hx      Reviewed and updated as needed this visit by Provider         ROS:  RESP: NEGATIVE for significant cough or SOB  CV: as noted above   PSYCHIATRIC: as noted above     This document serves as a record of the services and decisions personally performed and made by Kory Hudson MD. It was created on his behalf by Jacklyn Orellana, a trained medical scribe. The creation of this document is based on the provider's statements to the medical scribe.  Jacklyn Orellana 7:02 AM July 13, 2018    OBJECTIVE:     /70 (BP Location: Right arm, Patient Position: Chair, Cuff Size: Adult Regular)  Pulse 77  Temp 98  F (36.7  C) (Oral)  Ht 1.803 m (5' 11\")  Wt (!) 152.4 kg (336 lb)  SpO2 96%  BMI 46.86 kg/m2  Body mass index is 46.86 " kg/(m^2).  GENERAL: healthy, alert and no distress  RESP: lungs clear to auscultation - no rales, rhonchi or wheezes  CV: 1+ pitting edema lower half of leg and ankle with venus stasis skin changes, regular rates and rhythm, normal S1 S2, no S3 or S4, no murmur, click or rub and peripheral pulses strong    Diagnostic Test Results:  No results found for this or any previous visit (from the past 24 hour(s)).    ASSESSMENT/PLAN:     1. Peripheral edema  Appears to be likely chronic venous insufficiency.  Added 12.5 mg hydrochlorothiazide. Recommended wearing CPAP. Discussed wearing compression stockings, DASH diet, weight loss and elevating legs. Lab results pending. Follow-up for echocardiogram with his weight and other issues he should have this to rule out other cause.  Recheck in one month.   - Comprehensive metabolic panel  - CBC with platelets  - TSH with free T4 reflex  - BNP-N terminal pro  - Echocardiogram Complete; Future  - hydrochlorothiazide 12.5 MG TABS tablet; Take 1 tablet (12.5 mg) by mouth daily  Dispense: 30 tablet; Refill: 0    2. LOREN (obstructive sleep apnea)  Recommended wearing CPAP.     3. Morbid obesity (H)    4. Hypertension goal BP (blood pressure) < 140/90  - hydrochlorothiazide 12.5 MG TABS tablet; Take 1 tablet (12.5 mg) by mouth daily  Dispense: 30 tablet; Refill: 0      The information in this document, created by the medical scribe for me, accurately reflects the services I personally performed and the decisions made by me. I have reviewed and approved this document for accuracy prior to leaving the patient care area.  July 13, 2018 7:30 AM    Kory Hudson MD  [facility]

## 2018-07-13 NOTE — PATIENT INSTRUCTIONS
Likely chronic venous insufficiency causing swelling  - wear CPAP - call [facility] to get set up/may need sleep clinic follow-up    -Wear compression stockings  -watch salt in diet  -weight loss  -elevate legs    -we will check labs to rule out other cause  -echocardiogram at [facility] - they will call you to set this up    Follow-up for recheck in 1 month

## 2018-07-13 NOTE — MR AVS SNAPSHOT
After Visit Summary   7/13/2018    Kimo [place]    MRN: [id number]           Patient Information     Date Of Birth          1980        Visit Information        Provider Department      7/13/2018 7:00 AM Kory Hudson MD [facility]        Today's Diagnoses     Peripheral edema    -  1    LOREN (obstructive sleep apnea)        Morbid obesity (H)          Care Instructions    Likely chronic venous insufficiency causing swelling  - wear CPAP - call [facility] to get set up/may need sleep clinic follow-up    -Wear compression stockings  -watch salt in diet  -weight loss  -elevate legs    -we will check labs to rule out other cause  -echocardiogram at [facility] - they will call you to set this up    Follow-up for recheck in 1 month          Follow-ups after your visit        Follow-up notes from your care team     Return in about 1 month (around 8/13/2018) for medication recheck.      Your next 10 appointments already scheduled     Jul 19, 2018  9:00 AM CDT   Meliton Thompson with K[facility] Hudson MD   [facility]    [id number] [place] [address] [phone]              Future tests that were ordered for you today     Open Future Orders        Priority Expected Expires Ordered    Echocardiogram Complete Routine  7/13/2019 7/13/2018            Who to contact     If you have questions or need follow up information about today's clinic visit or your schedule please contact [facility] directly at [phone].  Normal or non-critical lab and imaging results will be communicated to you by MyChart, letter or phone within 4 business days after the clinic has received the results. If you do not hear from us within 7 days, please contact the clinic through MyChart or phone. If you have a critical or abnormal lab result, we will notify you by phone as soon as possible.  Submit refill requests through [employer] or  "call your pharmacy and they will forward the refill request to us. Please allow 3 business days for your refill to be completed.          Additional Information About Your Visit        [employer] Information     [employer] gives you secure access to your electronic health record. If you see a primary care provider, you can also send messages to your care team and make appointments. If you have questions, please call your primary care clinic.  If you do not have a primary care provider, please call [phone] and they will assist you.        Care EveryWhere ID     This is your Care EveryWhere ID. This could be used by other organizations to access your [facility] medical records  [id number]        Your Vitals Were     Pulse Temperature Height Pulse Oximetry BMI (Body Mass Index)       77 98  F (36.7  C) (Oral) 5' 11\" (1.803 m) 96% 46.86 kg/m2        Blood Pressure from Last 3 Encounters:   07/13/18 130/70   06/08/18 135/70   05/31/18 128/80    Weight from Last 3 Encounters:   07/13/18 (!) 336 lb (152.4 kg)   06/08/18 (!) 334 lb (151.5 kg)   05/31/18 (!) 356 lb (161.5 kg)              We Performed the Following     BNP-N terminal pro     CBC with platelets     Comprehensive metabolic panel     TSH with free T4 reflex          Today's Medication Changes          These changes are accurate as of 7/13/18  7:30 AM.  If you have any questions, ask your nurse or doctor.               Start taking these medicines.        Dose/Directions    hydrochlorothiazide 12.5 MG Tabs tablet   Used for:  Peripheral edema   Started by:  Kory Hudson MD        Dose:  12.5 mg   Take 1 tablet (12.5 mg) by mouth daily   Quantity:  30 tablet   Refills:  0            Where to get your medicines      These medications were sent to [facility] [address] [place] [address] [address] AT [address]  & [address]  [address], [facility] [address]     Phone:  [phone]     hydrochlorothiazide 12.5 MG Tabs tablet "                Primary Care Provider Office Phone # Fax #    Kory Hudson [phone] [address]  [facility] [address]        Equal Access to Services     JOSE CRUZ DEUTSCH : Hadii javier ku tuano Soelíasali, waaxda luqadaha, qaybta kaalmada adefelisada, paola tom laAmbrosiodavin dumont. So [facility] [phone].    ATENCIÓN: Si habla español, tiene a meng disposición servicios gratuitos de asistencia lingüística. Llame al [phone].    We comply with applicable federal civil rights laws and [place] laws. We do not discriminate on the basis of race, color, national origin, age, disability, sex, sexual orientation, or gender identity.            Thank you!     Thank you for choosing [facility]  for your care. Our goal is always to provide you with excellent care. Hearing back from our patients is one way we can continue to improve our services. Please take a few minutes to complete the written survey that you may receive in the mail after your visit with us. Thank you!             Your Updated Medication List - Protect others around you: Learn how to safely use, store and throw away your medicines at www.disposemymeds.org.          This list is accurate as of 7/13/18  7:30 AM.  Always use your most recent med list.                   Brand Name Dispense Instructions for use Diagnosis    ARIPiprazole 5 MG tablet    ABILIFY    45 tablet    Take 1.5 tablets (7.5 mg) by mouth every morning    Major depressive disorder, recurrent episode, moderate (H)       cholecalciferol 2000 units Caps     30 capsule    Take 1 capsule by mouth daily    Vitamin D deficiency       diclofenac 1 % Gel topical gel    VOLTAREN    100 g    Place 2-4 g onto the skin 4 times daily as needed for moderate pain    Chronic lumbosacral pain, Knee pain, unspecified chronicity, unspecified laterality       FLUoxetine 20 MG capsule    PROzac    90 capsule    Take 3 capsules (60 mg) by mouth daily    Moderate  major depression (H)       fluticasone 50 MCG/ACT spray    FLONASE     Spray 2 sprays into both nostrils every morning        hydrochlorothiazide 12.5 MG Tabs tablet     30 tablet    Take 1 tablet (12.5 mg) by mouth daily    Peripheral edema       hydrocortisone 2.5 % cream     30 g    Apply topically 2 times daily Apply sparing to face for 1-2 weeks.    Seborrheic dermatitis       ketoconazole 2 % shampoo    NIZORAL    120 mL    Apply to the scalp and beard two times per week and wash off after 5 minutes.    Seborrheic dermatitis       lisinopril 10 MG tablet    PRINIVIL/ZESTRIL    90 tablet    Take 1 tablet (10 mg) by mouth every morning    Hypertension goal BP (blood pressure) < 140/90       mometasone-formoterol 100-5 MCG/ACT oral inhaler    DULERA    1 Inhaler    Inhale 2 puffs into the lungs 2 times daily    Mild intermittent asthma without complication       ondansetron 4 MG ODT tab    ZOFRAN ODT    15 tablet    Take 1 tablet (4 mg) by mouth every 8 hours as needed for nausea        order for DME     1 each    auto CPAP 7-15 cm/h20    Obstructive sleep apnea (adult) (pediatric)       order for DME     30 each    Equipment being ordered: pull ups    Nocturnal enuresis       order for DME     1 Units    Equipment being ordered: CPAP mask and tubing    LOREN (obstructive sleep apnea)       order for DME     1 Units    Equipment being ordered: CPAP with mask and tubing    LOREN (obstructive sleep apnea)       pantoprazole 40 MG EC tablet    PROTONIX    90 tablet    Take 1 tablet (40 mg) by mouth daily Take 30-60 minutes before first meal of the day    Duodenitis, LPRD (laryngopharyngeal reflux disease)       psyllium 0.52 g capsule     180 capsule    Take 1 capsule (0.52 g) by mouth daily    Slow transit constipation       VENTOLIN  (90 Base) MCG/ACT Inhaler   Generic drug:  albuterol     1 Inhaler    INHALE 2 PUFFS INTO LUNGS EVERY SIX HOURS AS NEEDED FOR SHORTNESS OF BREATH / DYSPNEA OR WHEEZING    Mild  intermittent asthma without complication

## 2018-07-19 ENCOUNTER — HOSPITAL ENCOUNTER (OUTPATIENT)
Dept: CARDIOLOGY | Facility: CLINIC | Age: 38
Discharge: HOME | End: 2018-07-19
Attending: FAMILY MEDICINE | Admitting: FAMILY MEDICINE
Payer: COMMERCIAL

## 2018-07-19 DIAGNOSIS — R60.0 PERIPHERAL EDEMA: ICD-10-CM

## 2018-07-19 PROCEDURE — 93306 TTE W/DOPPLER COMPLETE: CPT

## 2018-07-19 PROCEDURE — 93306 TTE W/DOPPLER COMPLETE: CPT | Mod: 26 | Performed by: INTERNAL MEDICINE

## 2018-07-22 ENCOUNTER — TELEPHONE (OUTPATIENT)
Dept: FAMILY MEDICINE | Facility: CLINIC | Age: 38
End: 2018-07-22

## 2018-07-22 DIAGNOSIS — D64.9 ANEMIA, UNSPECIFIED TYPE: Primary | ICD-10-CM

## 2018-07-23 NOTE — TELEPHONE ENCOUNTER
Please notify patient/mother:    Hgb slightly low.  This is not affecting his swelling as it is just slightly low but I would recommend recheck lab when able for iron studies to see why this is the case.  He was anemic after hip surgery  Last year but we would expect it back to normal.    Your complete metabolic panel indicates normal kidney function, normal electrolyte levels (sodium, potassium, and calcium were normal), normal blood sugar level (glucose), and normal liver function (ALT, AST, bilirubin).    Your TSH level, a screening test for thyroid disease, was normal.    Echocardiogram was essentially normal - mild to moderate left ventricular hypertrophy (heart muscle slightly thick in that area)- this is often seen with hypertension especially if not controlled.  Very important to make sure he is getting his BP meds every day and make sure sleep apnea is treated as this can affect BP.      No other findings to suggest other cause for swelling - so with all normal tests swelling appears to be from poor vein function called chronic venous insufficiency.  Make sure you are using the compression stockings, try the hydrochlorothiazide BP med, elevate legs.

## 2018-08-08 DIAGNOSIS — M25.569 KNEE PAIN, UNSPECIFIED CHRONICITY, UNSPECIFIED LATERALITY: ICD-10-CM

## 2018-08-08 DIAGNOSIS — M54.50 CHRONIC LUMBOSACRAL PAIN: ICD-10-CM

## 2018-08-08 DIAGNOSIS — G89.29 CHRONIC LUMBOSACRAL PAIN: ICD-10-CM

## 2018-08-08 NOTE — TELEPHONE ENCOUNTER
"Requested Prescriptions   Pending Prescriptions Disp Refills     diclofenac (VOLTAREN) 1 % GEL topical gel [Pharmacy Med Name: DICLOFENAC SOD GEL 1% 100GM] 100 g     Last Written Prescription Date:  12/11/2017  Last Fill Quantity: 100 grams,  # refills: 4   Last office visit: 7/13/2018 with prescribing provider:  07/13/2018   Future Office Visit:   Next 5 appointments (look out 90 days)     Aug 13, 2018  9:00 AM CDT   SHORT with Kory Hudson MD   [facility] ([facility]    [address] [facility] [address] [phone]                  Sig: PLACE 2 TO 4 GRAMS ONTO THE SKIN FOUR TIMES A DAY AS NEEDED FOR MODERATE PAIN    Topical Steroids and Nonsteroidals Protocol Passed    8/8/2018  8:40 AM       Passed - Patient is age 6 or older       Passed - Authorizing prescriber's most recent note related to this medication read.    If refill request is for ophthalmic use, please forward request to provider for approval.         Passed - High potency steroid not ordered       Passed - Recent (12 mo) or future (30 days) visit within the authorizing provider's specialty    Patient had office visit in the last 12 months or has a visit in the next 30 days with authorizing provider or within the authorizing provider's specialty.  See \"Patient Info\" tab in inbasket, or \"Choose Columns\" in Meds & Orders section of the refill encounter.              Santiago Walton XRT  "

## 2018-08-08 NOTE — TELEPHONE ENCOUNTER
Prescription approved per [facility] Refill Protocol.    Nichol FISH RN, BSN, PHN  [employer] RN

## 2018-09-21 ENCOUNTER — MYC REFILL (OUTPATIENT)
Dept: FAMILY MEDICINE | Facility: CLINIC | Age: 38
End: 2018-09-21

## 2018-09-21 DIAGNOSIS — I10 HYPERTENSION GOAL BP (BLOOD PRESSURE) < 140/90: ICD-10-CM

## 2018-09-21 DIAGNOSIS — R60.0 PERIPHERAL EDEMA: ICD-10-CM

## 2018-09-21 NOTE — TELEPHONE ENCOUNTER
"Requested Prescriptions   Pending Prescriptions Disp Refills     hydrochlorothiazide 12.5 MG TABS tablet 30 tablet 0    Last Written Prescription Date:  07/13/2018  Last Fill Quantity: 30 tablet,  # refills: 0   Last office visit: 7/13/2018 with prescribing provider:  07/13/2018   Future Office Visit:     Sig: Take 1 tablet (12.5 mg) by mouth daily    Diuretics (Including Combos) Protocol Passed    9/21/2018 10:01 AM       Passed - Blood pressure under 140/90 in past 12 months    BP Readings from Last 3 Encounters:   07/13/18 130/70   06/08/18 135/70   05/31/18 128/80                Passed - Recent (12 mo) or future (30 days) visit within the authorizing provider's specialty    Patient had office visit in the last 12 months or has a visit in the next 30 days with authorizing provider or within the authorizing provider's specialty.  See \"Patient Info\" tab in inbasket, or \"Choose Columns\" in Meds & Orders section of the refill encounter.           Passed - Patient is age 18 or older       Passed - Normal serum creatinine on file in past 12 months    Recent Labs   Lab Test  07/13/18   0735   CR  0.80             Passed - Normal serum potassium on file in past 12 months    Recent Labs   Lab Test  07/13/18   0735   POTASSIUM  3.6                   Passed - Normal serum sodium on file in past 12 months    Recent Labs   Lab Test  07/13/18   0735   NA  140                Santiago AGUILAR  "

## 2018-09-21 NOTE — TELEPHONE ENCOUNTER
Mychart sent to see if pt has been taking this at all since July when it was prescribed.  During the office visit on 7/13/18 the [facility] pharmacy was indicated as [facility].    Anupama Ross RN, BSN

## 2018-09-21 NOTE — TELEPHONE ENCOUNTER
Message from [facility]:  Original authorizing provider: MD Willard Sánchez would like a refill of the following medications:  hydrochlorothiazide 12.5 MG TABS tablet [Kory Hudson MD]    Preferred pharmacy: [facility], [place] - [address] [place] [address]    Comment:  This message is being sent by Belen Ortega on behalf of Willard [place] Please be sure that this prescription goes to [facility] in [place]. This medication was never sent to [facility]'s pharmacy which is [place].

## 2018-09-24 RX ORDER — HYDROCHLOROTHIAZIDE 12.5 MG/1
12.5 TABLET ORAL DAILY
Qty: 90 TABLET | Refills: 1 | Status: SHIPPED | OUTPATIENT
Start: 2018-09-24 | End: 2018-12-31

## 2018-09-24 NOTE — TELEPHONE ENCOUNTER
He would be recommended to continue med.  3 month follow-up med check good idea for him next month.

## 2018-11-27 ENCOUNTER — TELEPHONE (OUTPATIENT)
Dept: FAMILY MEDICINE | Facility: CLINIC | Age: 38
End: 2018-11-27

## 2018-11-27 NOTE — TELEPHONE ENCOUNTER
Caregiver request:     I am requesting January 1st through December 31st 2019, 3 days per week, 4 hours per day.    Does Caregiver need an appointment for completion?    Ivanna Ashby

## 2018-11-29 DIAGNOSIS — K29.80 DUODENITIS: ICD-10-CM

## 2018-11-29 DIAGNOSIS — I10 HYPERTENSION GOAL BP (BLOOD PRESSURE) < 140/90: ICD-10-CM

## 2018-11-29 DIAGNOSIS — K21.9 LPRD (LARYNGOPHARYNGEAL REFLUX DISEASE): ICD-10-CM

## 2018-11-29 DIAGNOSIS — E55.9 VITAMIN D DEFICIENCY: ICD-10-CM

## 2018-11-29 NOTE — TELEPHONE ENCOUNTER
"Routing refill request to provider for review/approval because:  Patient needs to be seen because:  Past due for a follow up.  Per July visit pt was suppose to follow up in August and didn't then a message was sent in September requesting a follow up in October but this wasn't done either.    Rebecca sent requesting appt again    Anupama Ross RN, BSN       Last office visit: 7/13/2018 with prescribing provider:  Tristan   Future Office Visit:    Requested Prescriptions   Pending Prescriptions Disp Refills     Cholecalciferol (VITAMIN D3) 2000 units CAPS [Pharmacy Med Name: VITAMIN D3 2000 UNIT CAP] 28 capsule      Sig: TAKE 1 CAPSULE BY MOUTH DAILY    Vitamin Supplements (Adult) Protocol Passed    11/29/2018  8:48 AM       Passed - High dose Vitamin D not ordered       Passed - Recent (12 mo) or future (30 days) visit within the authorizing provider's specialty    Patient had office visit in the last 12 months or has a visit in the next 30 days with authorizing provider or within the authorizing provider's specialty.  See \"Patient Info\" tab in inbasket, or \"Choose Columns\" in Meds & Orders section of the refill encounter.              pantoprazole (PROTONIX) 40 MG EC tablet [Pharmacy Med Name: PANTOPRAZOLE 40MG TAB] 28 tablet      Sig: TAKE 1 TABLET BY MOUTH 30-60 MINUTES BEFORE FIRST MEAL OF THE DAY    PPI Protocol Passed    11/29/2018  8:48 AM       Passed - Not on Clopidogrel (unless Pantoprazole ordered)       Passed - No diagnosis of osteoporosis on record       Passed - Recent (12 mo) or future (30 days) visit within the authorizing provider's specialty    Patient had office visit in the last 12 months or has a visit in the next 30 days with authorizing provider or within the authorizing provider's specialty.  See \"Patient Info\" tab in inbasket, or \"Choose Columns\" in Meds & Orders section of the refill encounter.             Passed - Patient is age 18 or older        lisinopril (PRINIVIL/ZESTRIL) 10 MG tablet " "[Pharmacy Med Name: LISINOPRIL 10MG TAB] 28 tablet      Sig: TAKE 1 TABLET BY MOUTH EVERY MORNING    ACE Inhibitors (Including Combos) Protocol Passed    11/29/2018  8:48 AM       Passed - Blood pressure under 140/90 in past 12 months    BP Readings from Last 3 Encounters:   07/13/18 130/70   06/08/18 135/70   05/31/18 128/80                Passed - Recent (12 mo) or future (30 days) visit within the authorizing provider's specialty    Patient had office visit in the last 12 months or has a visit in the next 30 days with authorizing provider or within the authorizing provider's specialty.  See \"Patient Info\" tab in inbasket, or \"Choose Columns\" in Meds & Orders section of the refill encounter.             Passed - Patient is age 18 or older       Passed - Normal serum creatinine on file in past 12 months    Recent Labs   Lab Test  07/13/18   0735   CR  0.80            Passed - Normal serum potassium on file in past 12 months    Recent Labs   Lab Test  07/13/18   0735   POTASSIUM  3.6               "

## 2018-11-30 ENCOUNTER — MYC MEDICAL ADVICE (OUTPATIENT)
Dept: FAMILY MEDICINE | Facility: CLINIC | Age: 38
End: 2018-11-30

## 2018-11-30 RX ORDER — LISINOPRIL 10 MG/1
TABLET ORAL
Qty: 28 TABLET | Refills: 0 | Status: SHIPPED | OUTPATIENT
Start: 2018-11-30 | End: 2018-12-18

## 2018-11-30 RX ORDER — ACETAMINOPHEN 160 MG
TABLET,DISINTEGRATING ORAL
Qty: 28 CAPSULE | Refills: 0 | Status: SHIPPED | OUTPATIENT
Start: 2018-11-30 | End: 2018-12-20

## 2018-11-30 RX ORDER — PANTOPRAZOLE SODIUM 40 MG/1
TABLET, DELAYED RELEASE ORAL
Qty: 28 TABLET | Refills: 0 | Status: SHIPPED | OUTPATIENT
Start: 2018-11-30 | End: 2018-12-18

## 2018-11-30 NOTE — TELEPHONE ENCOUNTER
Form is at Dr. Hudson station to review and sign. Will postpone encounter until 12/04/18. Fatou Pantoja

## 2018-12-06 ENCOUNTER — TRANSFERRED RECORDS (OUTPATIENT)
Dept: HEALTH INFORMATION MANAGEMENT | Facility: CLINIC | Age: 38
End: 2018-12-06

## 2018-12-12 ENCOUNTER — MEDICAL CORRESPONDENCE (OUTPATIENT)
Dept: HEALTH INFORMATION MANAGEMENT | Facility: CLINIC | Age: 38
End: 2018-12-12

## 2018-12-14 NOTE — TELEPHONE ENCOUNTER
Form completed and faxed to [phone] and placed up at the  for her to . Belen was notified of this.    Fatou Pantoja

## 2018-12-18 DIAGNOSIS — K21.9 LPRD (LARYNGOPHARYNGEAL REFLUX DISEASE): ICD-10-CM

## 2018-12-18 DIAGNOSIS — K59.01 SLOW TRANSIT CONSTIPATION: ICD-10-CM

## 2018-12-18 DIAGNOSIS — K29.80 DUODENITIS: ICD-10-CM

## 2018-12-18 DIAGNOSIS — I10 HYPERTENSION GOAL BP (BLOOD PRESSURE) < 140/90: ICD-10-CM

## 2018-12-18 RX ORDER — PANTOPRAZOLE SODIUM 40 MG/1
TABLET, DELAYED RELEASE ORAL
Qty: 28 TABLET | Refills: 0 | Status: SHIPPED | OUTPATIENT
Start: 2018-12-18 | End: 2018-12-31

## 2018-12-18 RX ORDER — LISINOPRIL 10 MG/1
TABLET ORAL
Qty: 28 TABLET | Refills: 0 | Status: SHIPPED | OUTPATIENT
Start: 2018-12-18 | End: 2018-12-31

## 2018-12-18 NOTE — TELEPHONE ENCOUNTER
Prescription approved per [facility] Refill Protocol.  Anupama Ross RN, BSN

## 2018-12-18 NOTE — TELEPHONE ENCOUNTER
"psyllium 0.52 G capsule      Last Written Prescription Date:  12/11/2017  Last Fill Quantity: 180 capsule,   # refills: 3  Last Office Visit: 07/13/2018  Future Office visit:    Next 5 appointments (look out 90 days)    Dec 31, 2018 10:40 AM CST  MyChart Short with Kory [facility] MD  [facility] [address] [address]  [phone]           Routing refill request to provider for review/approval because:  Drug not on the [facility], P or [facility] refill protocol or controlled substance      Requested Prescriptions   Pending Prescriptions Disp Refills                        lisinopril (PRINIVIL/ZESTRIL) 10 MG tablet [Pharmacy Med Name: LISINOPRIL 10MG TAB] 28 tablet 0    Last Written Prescription Date:  11/30/2018  Last Fill Quantity: 28 tablet,  # refills: 0   Last office visit: 7/13/2018 with prescribing provider:  07/13/2018   Future Office Visit:   Next 5 appointments (look out 90 days)    Dec 31, 2018 10:40 AM CST  MyChart Short with Kory Hudson MD  [facility] [address] [address]  [phone]          Sig: TAKE 1 TABLET BY MOUTH EVERY MORNING    ACE Inhibitors (Including Combos) Protocol Passed - 12/18/2018 10:59 AM       Passed - Blood pressure under 140/90 in past 12 months    BP Readings from Last 3 Encounters:   07/13/18 130/70   06/08/18 135/70   05/31/18 128/80                Passed - Recent (12 mo) or future (30 days) visit within the authorizing provider's specialty    Patient had office visit in the last 12 months or has a visit in the next 30 days with authorizing provider or within the authorizing provider's specialty.  See \"Patient Info\" tab in inbasket, or \"Choose Columns\" in Meds & Orders section of the refill encounter.             Passed - Patient is age 18 or older       Passed - Normal serum creatinine on file in past 12 months    Recent Labs   Lab Test " "07/13/18  0735   CR 0.80            Passed - Normal serum potassium on file in past 12 months    Recent Labs   Lab Test 07/13/18  0735   POTASSIUM 3.6                 pantoprazole (PROTONIX) 40 MG EC tablet [Pharmacy Med Name: PANTOPRAZOLE 40MG TAB] 28 tablet 0    Last Written Prescription Date:  11/30/2018  Last Fill Quantity: 28 tablet,  # refills: 0   Last office visit: 7/13/2018 with prescribing provider:  07/13/2018   Future Office Visit:   Next 5 appointments (look out 90 days)    Dec 31, 2018 10:40 AM CST  Meliton Mejia with Kory Hudson MD  [facility] [address] [place] [address] [phone]          Sig: TAKE 1 TABLET BY MOUTH 30-60 MINUTES BEFORE FIRST MEAL OF THE DAY    PPI Protocol Passed - 12/18/2018 10:59 AM       Passed - Not on Clopidogrel (unless Pantoprazole ordered)       Passed - No diagnosis of osteoporosis on record       Passed - Recent (12 mo) or future (30 days) visit within the authorizing provider's specialty    Patient had office visit in the last 12 months or has a visit in the next 30 days with authorizing provider or within the authorizing provider's specialty.  See \"Patient Info\" tab in inbasket, or \"Choose Columns\" in Meds & Orders section of the refill encounter.             Passed - Patient is age 18 or older          "

## 2018-12-20 DIAGNOSIS — E55.9 VITAMIN D DEFICIENCY: ICD-10-CM

## 2018-12-20 NOTE — TELEPHONE ENCOUNTER
"Requested Prescriptions   Pending Prescriptions Disp Refills     Cholecalciferol (VITAMIN D3) 2000 units CAPS [Pharmacy Med Name: VITAMIN D3 2000 UNIT CAP] 28 capsule 0    Last Written Prescription Date:  11/30/2018  Last Fill Quantity: 28 capsule,  # refills: 0   Last office visit: 7/13/2018 with prescribing provider:  07/13/2018   Future Office Visit:   Next 5 appointments (look out 90 days)    Dec 31, 2018 10:40 AM CST  MyChart Short with Kory Hudson MD  [facility] [address] [place] [address] [phone]          Sig: TAKE 1 CAPSULE BY MOUTH DAILY    Vitamin Supplements (Adult) Protocol Passed - 12/20/2018  4:57 PM       Passed - High dose Vitamin D not ordered       Passed - Recent (12 mo) or future (30 days) visit within the authorizing provider's specialty    Patient had office visit in the last 12 months or has a visit in the next 30 days with authorizing provider or within the authorizing provider's specialty.  See \"Patient Info\" tab in inbasket, or \"Choose Columns\" in Meds & Orders section of the refill encounter.              Santiago Walton XRT  "

## 2018-12-21 RX ORDER — ACETAMINOPHEN 160 MG
TABLET,DISINTEGRATING ORAL
Qty: 28 CAPSULE | Refills: 0 | Status: SHIPPED | OUTPATIENT
Start: 2018-12-21 | End: 2018-12-31

## 2018-12-21 NOTE — TELEPHONE ENCOUNTER
Prescription approved per [facility] [place].  Anupama Ross RN, BSN

## 2018-12-27 ENCOUNTER — TRANSFERRED RECORDS (OUTPATIENT)
Dept: HEALTH INFORMATION MANAGEMENT | Facility: CLINIC | Age: 38
End: 2018-12-27

## 2018-12-27 ENCOUNTER — MYC MEDICAL ADVICE (OUTPATIENT)
Dept: FAMILY MEDICINE | Facility: CLINIC | Age: 38
End: 2018-12-27

## 2018-12-31 ENCOUNTER — OFFICE VISIT (OUTPATIENT)
Dept: FAMILY MEDICINE | Facility: CLINIC | Age: 38
End: 2018-12-31
Payer: COMMERCIAL

## 2018-12-31 VITALS
BODY MASS INDEX: 44.1 KG/M2 | HEART RATE: 77 BPM | SYSTOLIC BLOOD PRESSURE: 128 MMHG | OXYGEN SATURATION: 100 % | WEIGHT: 315 LBS | HEIGHT: 71 IN | DIASTOLIC BLOOD PRESSURE: 86 MMHG | TEMPERATURE: 98 F

## 2018-12-31 DIAGNOSIS — L03.312 CELLULITIS OF BACK: ICD-10-CM

## 2018-12-31 DIAGNOSIS — D64.9 ANEMIA, UNSPECIFIED TYPE: ICD-10-CM

## 2018-12-31 DIAGNOSIS — M25.569 KNEE PAIN, UNSPECIFIED CHRONICITY, UNSPECIFIED LATERALITY: ICD-10-CM

## 2018-12-31 DIAGNOSIS — F33.1 MAJOR DEPRESSIVE DISORDER, RECURRENT EPISODE, MODERATE (H): ICD-10-CM

## 2018-12-31 DIAGNOSIS — K21.9 LPRD (LARYNGOPHARYNGEAL REFLUX DISEASE): ICD-10-CM

## 2018-12-31 DIAGNOSIS — Z01.818 PREOP GENERAL PHYSICAL EXAM: Primary | ICD-10-CM

## 2018-12-31 DIAGNOSIS — E66.01 MORBID OBESITY (H): ICD-10-CM

## 2018-12-31 DIAGNOSIS — K29.80 DUODENITIS: ICD-10-CM

## 2018-12-31 DIAGNOSIS — R60.0 PERIPHERAL EDEMA: ICD-10-CM

## 2018-12-31 DIAGNOSIS — E55.9 VITAMIN D DEFICIENCY: ICD-10-CM

## 2018-12-31 DIAGNOSIS — N32.81 OVERACTIVE BLADDER: ICD-10-CM

## 2018-12-31 DIAGNOSIS — M54.50 CHRONIC LUMBOSACRAL PAIN: ICD-10-CM

## 2018-12-31 DIAGNOSIS — I10 HYPERTENSION GOAL BP (BLOOD PRESSURE) < 140/90: ICD-10-CM

## 2018-12-31 DIAGNOSIS — J45.20 MILD INTERMITTENT ASTHMA WITHOUT COMPLICATION: ICD-10-CM

## 2018-12-31 DIAGNOSIS — G89.29 CHRONIC LUMBOSACRAL PAIN: ICD-10-CM

## 2018-12-31 DIAGNOSIS — F32.1 MODERATE MAJOR DEPRESSION (H): ICD-10-CM

## 2018-12-31 DIAGNOSIS — Z23 NEED FOR PROPHYLACTIC VACCINATION AND INOCULATION AGAINST INFLUENZA: ICD-10-CM

## 2018-12-31 LAB
ALBUMIN UR-MCNC: NEGATIVE MG/DL
APPEARANCE UR: CLEAR
ASTHMA CONTROL TEST SCORE: 17
BASOPHILS # BLD AUTO: 0 10E9/L (ref 0–0.2)
BASOPHILS NFR BLD AUTO: 0.1 %
BILIRUB UR QL STRIP: NEGATIVE
COLOR UR AUTO: YELLOW
DIFFERENTIAL METHOD BLD: NORMAL
EOSINOPHIL # BLD AUTO: 0.2 10E9/L (ref 0–0.7)
EOSINOPHIL NFR BLD AUTO: 2.2 %
ER ASTHMA: 5
ERYTHROCYTE [DISTWIDTH] IN BLOOD BY AUTOMATED COUNT: 13.1 % (ref 10–15)
GLUCOSE UR STRIP-MCNC: NEGATIVE MG/DL
HCT VFR BLD AUTO: 40.4 % (ref 40–53)
HGB BLD-MCNC: 13.5 G/DL (ref 13.3–17.7)
HGB UR QL STRIP: NEGATIVE
HOSPITALIZATION ASTHMA: 5
KETONES UR STRIP-MCNC: NEGATIVE MG/DL
LEUKOCYTE ESTERASE UR QL STRIP: NEGATIVE
LYMPHOCYTES # BLD AUTO: 2 10E9/L (ref 0.8–5.3)
LYMPHOCYTES NFR BLD AUTO: 25.5 %
MCH RBC QN AUTO: 28.1 PG (ref 26.5–33)
MCHC RBC AUTO-ENTMCNC: 33.4 G/DL (ref 31.5–36.5)
MCV RBC AUTO: 84 FL (ref 78–100)
MONOCYTES # BLD AUTO: 0.4 10E9/L (ref 0–1.3)
MONOCYTES NFR BLD AUTO: 4.7 %
NEUTROPHILS # BLD AUTO: 5.2 10E9/L (ref 1.6–8.3)
NEUTROPHILS NFR BLD AUTO: 67.5 %
NITRATE UR QL: NEGATIVE
PH UR STRIP: 7.5 PH (ref 5–7)
PLATELET # BLD AUTO: 248 10E9/L (ref 150–450)
RBC # BLD AUTO: 4.8 10E12/L (ref 4.4–5.9)
SOURCE: ABNORMAL
SP GR UR STRIP: 1.01 (ref 1–1.03)
UROBILINOGEN UR STRIP-ACNC: 1 EU/DL (ref 0.2–1)
VIT B12 SERPL-MCNC: 280 PG/ML (ref 193–986)
WBC # BLD AUTO: 7.7 10E9/L (ref 4–11)

## 2018-12-31 PROCEDURE — 36415 COLL VENOUS BLD VENIPUNCTURE: CPT | Performed by: FAMILY MEDICINE

## 2018-12-31 PROCEDURE — 99215 OFFICE O/P EST HI 40 MIN: CPT | Mod: 25 | Performed by: FAMILY MEDICINE

## 2018-12-31 PROCEDURE — 85025 COMPLETE CBC W/AUTO DIFF WBC: CPT | Performed by: FAMILY MEDICINE

## 2018-12-31 PROCEDURE — 90471 IMMUNIZATION ADMIN: CPT | Performed by: FAMILY MEDICINE

## 2018-12-31 PROCEDURE — 80061 LIPID PANEL: CPT | Performed by: FAMILY MEDICINE

## 2018-12-31 PROCEDURE — 83550 IRON BINDING TEST: CPT | Performed by: FAMILY MEDICINE

## 2018-12-31 PROCEDURE — 83540 ASSAY OF IRON: CPT | Performed by: FAMILY MEDICINE

## 2018-12-31 PROCEDURE — 82306 VITAMIN D 25 HYDROXY: CPT | Performed by: FAMILY MEDICINE

## 2018-12-31 PROCEDURE — 82607 VITAMIN B-12: CPT | Performed by: FAMILY MEDICINE

## 2018-12-31 PROCEDURE — 93000 ELECTROCARDIOGRAM COMPLETE: CPT | Performed by: FAMILY MEDICINE

## 2018-12-31 PROCEDURE — 81003 URINALYSIS AUTO W/O SCOPE: CPT | Performed by: FAMILY MEDICINE

## 2018-12-31 PROCEDURE — 82728 ASSAY OF FERRITIN: CPT | Performed by: FAMILY MEDICINE

## 2018-12-31 PROCEDURE — 90686 IIV4 VACC NO PRSV 0.5 ML IM: CPT | Performed by: FAMILY MEDICINE

## 2018-12-31 PROCEDURE — 80053 COMPREHEN METABOLIC PANEL: CPT | Performed by: FAMILY MEDICINE

## 2018-12-31 RX ORDER — CEPHALEXIN 500 MG/1
500 CAPSULE ORAL 2 TIMES DAILY
Qty: 14 CAPSULE | Refills: 0 | Status: SHIPPED | OUTPATIENT
Start: 2018-12-31 | End: 2019-05-17

## 2018-12-31 RX ORDER — LISINOPRIL 10 MG/1
10 TABLET ORAL EVERY MORNING
Qty: 90 TABLET | Refills: 3 | Status: SHIPPED | OUTPATIENT
Start: 2018-12-31 | End: 2019-12-23

## 2018-12-31 RX ORDER — PANTOPRAZOLE SODIUM 40 MG/1
TABLET, DELAYED RELEASE ORAL
Qty: 90 TABLET | Refills: 3 | Status: SHIPPED | OUTPATIENT
Start: 2018-12-31 | End: 2019-12-23

## 2018-12-31 RX ORDER — ACETAMINOPHEN 160 MG
1 TABLET,DISINTEGRATING ORAL DAILY
Qty: 90 CAPSULE | Refills: 3 | Status: SHIPPED | OUTPATIENT
Start: 2018-12-31 | End: 2019-12-23

## 2018-12-31 RX ORDER — ALBUTEROL SULFATE 90 UG/1
AEROSOL, METERED RESPIRATORY (INHALATION)
Qty: 1 INHALER | Refills: 11 | Status: SHIPPED | OUTPATIENT
Start: 2018-12-31 | End: 2021-01-22

## 2018-12-31 RX ORDER — HYDROCHLOROTHIAZIDE 12.5 MG/1
12.5 TABLET ORAL DAILY
Qty: 90 TABLET | Refills: 1 | Status: SHIPPED | OUTPATIENT
Start: 2018-12-31 | End: 2019-07-03

## 2018-12-31 ASSESSMENT — MIFFLIN-ST. JEOR: SCORE: 2639.05

## 2018-12-31 NOTE — PROGRESS NOTES
[facility]  [id number] [place] [id number]  [phone]  Dept: [phone]    PRE-OP EVALUATION:  Today's date: 2018    Kimo Greenwood (: 1980) presents for pre-operative evaluation assessment as requested by Dr. Phan.  He requires evaluation and anesthesia risk assessment prior to undergoing surgery/procedure for treatment of botox injection .    Fax number for surgical facility: [phone], may be part of [facility]  Primary Physician: Kory Hudson  Type of Anesthesia Anticipated: General    Patient has a Health Care Directive or Living Will:  NO    Preop Questions 2018   Who is doing your surgery? Dr Ibarra   What are you having done? Botox injection bladder   Date of Surgery/Procedure: 2019   Facility or Hospital where procedure/surgery will be performed: [facility]   1.  Do you have a history of Heart attack, stroke, stent, coronary bypass surgery, or other heart surgery? No   2.  Do you ever have any pain or discomfort in your chest? No   3.  Do you have a history of  Heart Failure? No   4.   Are you troubled by shortness of breath when:  walking on a level surface, or up a slight hill, or at night? No   5.  Do you currently have a cold, bronchitis or other respiratory infection? No   6.  Do you have a cough, shortness of breath, or wheezing? No   7.  Do you sometimes get pains in the calves of your legs when you walk? No   8. Do you or anyone in your family have previous history of blood clots? No   9.  Do you or does anyone in your family have a serious bleeding problem such as prolonged bleeding following surgeries or cuts? No   10. Have you ever had problems with anemia or been told to take iron pills? No   11. Have you had any abnormal blood loss such as black, tarry or bloody stools? No   12. Have you ever had a blood transfusion? No   13. Have you or any of your relatives ever had problems with anesthesia? NO    14. Do you have sleep apnea, excessive snoring or daytime drowsiness? YES - sleep apnea   15. Do you have any prosthetic heart valves? No   16. Do you have prosthetic joints? YES - left hip       HPI:     HPI related to upcoming procedure: Botox injection with sedation for treatment of overactive bladder with nocturnal enuresis.     History of morbid obesity.     History of intermittent asthma. Reports increased coughing. He stopped using the inhalers.     History of hypertension. Well controlled with lisinopril and hydrochlorothiazide. Denies chest pain, heart palpitations, peripheral edema, shortness of breath, lightheadedness, or vision changes.     History of obstructive sleep apnea.     History of vitamin D deficiency.     History of moderate major depression. Patient is taking Abilify and Fluoxetine. He also attends individual DVT therapy at the [facility] and has weekly visits with the [facility] worker.     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 None 11/2016. Next colonoscopy 11/2021 if not sooner.       Bilateral low back pain with left-sided sciatica 10/08/2015     Priority: Medium     Chronic low back pain 10/06/2015     Priority: Medium     Suicidal ideation 08/23/2015     Priority: Medium     Leukocytosis 06/09/2014     Priority: Medium     Morbid obesity (H) 05/20/2014     Priority: Medium     Hypertension goal BP  (blood pressure) < 140/90 02/03/2014     Priority: Medium     Insomnia 05/14/2013     Priority: Medium     LOREN (obstructive sleep apnea) 04/23/2013     Priority: Medium     CPAP       Moderate major depression (H) 01/15/2013     Priority: Medium     Speech/language delay 11/11/2010     Priority: Medium     Tobacco use disorder 11/11/2010     Priority: Medium     Nocturnal enuresis      Priority: Medium      Past Medical History:   Diagnosis Date     Hypertension      Leukocytosis 6/9/2014     Marijuana abuse      MDD (major depressive disorder)      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    With associated speech/language delay     Obesity 11/11/2010     LOREN (obstructive sleep apnea)      Other chronic pain     left hip/leg pain     Seborrheic dermatitis      Uncomplicated asthma      Past Surgical History:   Procedure Laterality Date     ARTHROPLASTY HIP Left 1/25/2017    Procedure: ARTHROPLASTY HIP;  Surgeon: Bala Randall MD;  Location: [facility] OR     COLONOSCOPY       COLONOSCOPY N/A 10/30/2015    Procedure: COMBINED COLONOSCOPY, SINGLE OR MULTIPLE BIOPSY/POLYPECTOMY BY BIOPSY;  Surgeon: Alexis Jackson MD;  Location:  GI     COLONOSCOPY N/A 11/17/2016    Procedure: COMBINED COLONOSCOPY, SINGLE OR MULTIPLE BIOPSY/POLYPECTOMY BY BIOPSY;  Surgeon: Cat Huber MD;  Location: [facility] GI     ESOPHAGOSCOPY, GASTROSCOPY, DUODENOSCOPY (EGD), COMBINED N/A 11/17/2016    Procedure: COMBINED ESOPHAGOSCOPY, GASTROSCOPY, DUODENOSCOPY (EGD), BIOPSY SINGLE OR MULTIPLE;  Surgeon: Cat Huber MD;  Location: [facility] GI     EXAM UNDER ANESTHESIA, FULGURATE CONDYLOMA ANUS, COMBINED N/A 12/22/2016    Procedure: COMBINED EXAM UNDER ANESTHESIA, FULGURATE CONDYLOMA ANUS;  Surgeon: Nya Cabello MD;  Location: [facility] OR     GENITOURINARY SURGERY      bladder, Ibarra, [facility],Interstem stimulator implant     ORTHOPEDIC SURGERY       Current Outpatient Medications   Medication Sig  Dispense Refill     albuterol (VENTOLIN HFA) 108 (90 Base) MCG/ACT inhaler INHALE 2 PUFFS INTO LUNGS EVERY SIX HOURS AS NEEDED FOR SHORTNESS OF BREATH / DYSPNEA OR WHEEZING 1 Inhaler 11     ARIPiprazole (ABILIFY) 5 MG tablet Take 1.5 tablets (7.5 mg) by mouth every morning 45 tablet 1     cephALEXin (KEFLEX) 500 MG capsule Take 1 capsule (500 mg) by mouth 2 times daily for 7 days 14 capsule 0     Cholecalciferol (VITAMIN D3) 2000 units CAPS Take 1 capsule by mouth daily 90 capsule 3     diclofenac (VOLTAREN) 1 % GEL topical gel PLACE 2 TO 4 GRAMS ONTO THE SKIN FOUR TIMES A DAY AS NEEDED FOR MODERATE PAIN 100 g 1     FIBER LAXATIVE 0.52 g capsule TAKE 1 CAPSULE BY MOUTH DAILY 28 capsule 0     FLUoxetine (PROZAC) 20 MG capsule Take 3 capsules (60 mg) by mouth daily 90 capsule 1     fluticasone (FLONASE) 50 MCG/ACT spray Spray 2 sprays into both nostrils every morning       hydrochlorothiazide (HYDRODIURIL) 12.5 MG tablet Take 1 tablet (12.5 mg) by mouth daily 90 tablet 1     hydrocortisone 2.5 % cream Apply topically 2 times daily Apply sparing to face for 1-2 weeks. 30 g 2     ketoconazole (NIZORAL) 2 % shampoo Apply to the scalp and beard two times per week and wash off after 5 minutes. 120 mL 11     lisinopril (PRINIVIL/ZESTRIL) 10 MG tablet Take 1 tablet (10 mg) by mouth every morning 90 tablet 3     mometasone-formoterol (DULERA) 100-5 MCG/ACT inhaler Inhale 2 puffs into the lungs 2 times daily 1 Inhaler 11     ondansetron (ZOFRAN ODT) 4 MG disintegrating tablet Take 1 tablet (4 mg) by mouth every 8 hours as needed for nausea 15 tablet 0     order for DME Equipment being ordered: knee high compression stockings 20-30mmhg 2 each 1     order for DME Equipment being ordered: CPAP with mask and tubing setting 7-15 cm/h2O 1 Units 0     order for DME Equipment being ordered: CPAP mask and tubing 1 Units 0     order for DME Equipment being ordered: pull ups 30 each 6     ORDER FOR DME auto CPAP 7-15 cm/h20 1 each 0      "pantoprazole (PROTONIX) 40 MG EC tablet TAKE 1 TABLET BY MOUTH 30-60 MINUTES BEFORE FIRST MEAL OF THE DAY 90 tablet 3     OTC products: None, except as noted above    Allergies   Allergen Reactions     No Clinical Screening - See Comments Other (See Comments)     Awoke from anesthesia with anger and ripping off dressing, after interstim placement, outside hospital 2013      Latex Allergy: NO    Social History     Tobacco Use     Smoking status: Former Smoker     Packs/day: 0.50     Years: 1.00     Pack years: 0.50     Types: Cigarettes     Smokeless tobacco: Never Used     Tobacco comment: Smokes E Cigs equal to 1 PPD   Substance Use Topics     Alcohol use: Yes     Comment: socially--\"not very often\" \"once a month\"     History   Drug Use No     Comment: patient denies any marijuana use       REVIEW OF SYSTEMS:   CONSTITUTIONAL: NEGATIVE for fever, chills, change in weight  INTEGUMENTARY/SKIN: NEGATIVE for worrisome rashes, moles or lesions  EYES: NEGATIVE for vision changes or irritation  ENT/MOUTH: NEGATIVE for ear, mouth and throat problems  RESP: NEGATIVE for significant cough or SOB  CV: NEGATIVE for chest pain, palpitations or peripheral edema  GI: NEGATIVE for nausea, abdominal pain, heartburn, or change in bowel habits  : NEGATIVE for frequency, dysuria, or hematuria  MUSCULOSKELETAL: NEGATIVE for significant arthralgias or myalgia  NEURO: NEGATIVE for weakness, dizziness or paresthesias  PSYCHIATRIC: NEGATIVE for changes in mood or affect    This document serves as a record of the services and decisions personally performed and made by Kory Hudson MD. It was created on his behalf by Jacklyn Orellana, a trained medical scribe. The creation of this document is based on the provider's statements to the medical scribe.  Jacklyn Orellana 11:00 AM December 31, 2018    EXAM:   /86 (BP Location: Right arm, Patient Position: Chair, Cuff Size: Adult Large)   Pulse 77   Temp 98  F (36.7  C) (Oral)   Ht 1.803 m " "(5' 11\")   Wt (!) 169.2 kg (373 lb)   SpO2 100%   BMI 52.02 kg/m      GENERAL APPEARANCE: healthy, alert and no distress     EYES: EOMI,  PERRL     HENT: ear canals and TM's normal and nose and mouth without ulcers or lesions     NECK: no adenopathy, no asymmetry, masses, or scars and thyroid normal to palpation     RESP: lungs clear to auscultation - no rales, rhonchi or wheezes     CV: Trace swelling, regular rates and rhythm, normal S1 S2, no S3 or S4 and no murmur, click or rub     ABDOMEN:  soft, nontender, no HSM or masses and bowel sounds normal     MS: extremities normal- no gross deformities noted, no evidence of inflammation in joints, FROM in all extremities.     SKIN: skin area with slight erythema and warmth, right lower back      NEURO: Normal strength and tone, sensory exam grossly normal, mentation intact and speech normal     PSYCH: mentation appears normal and affect normal/bright    DIAGNOSTICS:   EKG: appears normal, NSR, normal axis, normal intervals, no acute ST/T changes c/w ischemia, no LVH by voltage criteria, unchanged from previous tracings    Recent Labs   Lab Test 07/13/18  0735 03/20/17  1117  01/09/17  0849  01/25/16  1541   HGB 13.1* 12.7*   < > 12.9*   < > 14.8    246   < > 244   < > 268   INR  --   --   --  0.92  --   --     140  --  140   < > 140   POTASSIUM 3.6 4.0  --  4.0   < > 4.4   CR 0.80 0.97   < > 1.06   < > 0.75   A1C  --  4.6  --   --   --  4.9    < > = values in this interval not displayed.     IMPRESSION:   Reason for surgery/procedure: Overactive bladder with nocturnal enuresis  Diagnosis/reason for consult: preoperative evaluation for assessment of cardiovascular and respiratory disease as well as overall risk assessment and perioperative medical management.    The proposed surgical procedure is considered LOW risk.    REVISED CARDIAC RISK INDEX  The patient has the following serious cardiovascular risks for perioperative complications such as (MI, PE, " VFib and 3  AV Block):  No serious cardiac risks  INTERPRETATION: 0 risks: Class I (very low risk - 0.4% complication rate)    The patient has the following additional risks for perioperative complications:  Morbid obesity      ICD-10-CM    1. Preop general physical exam Z01.818 UA reflex to Microscopic and Culture   2. Overactive bladder N32.81    3. Need for prophylactic vaccination and inoculation against influenza Z23 FLU VACCINE, SPLIT VIRUS, IM (QUADRIVALENT) [30345]- >3 YRS     Vaccine Administration, Initial [14848]   4. Hypertension goal BP (blood pressure) < 140/90 I10 lisinopril (PRINIVIL/ZESTRIL) 10 MG tablet     hydrochlorothiazide (HYDRODIURIL) 12.5 MG tablet     Lipid panel reflex to direct LDL Fasting     Comprehensive metabolic panel     CANCELED: Basic metabolic panel   5. Moderate major depression (H) F32.1    6. Major depressive disorder, recurrent episode, moderate (H) F33.1    7. Duodenitis K29.80 pantoprazole (PROTONIX) 40 MG EC tablet   8. LPRD (laryngopharyngeal reflux disease) K21.9 pantoprazole (PROTONIX) 40 MG EC tablet   9. Peripheral edema R60.9 hydrochlorothiazide (HYDRODIURIL) 12.5 MG tablet   10. Mild intermittent asthma without complication J45.20 albuterol (VENTOLIN HFA) 108 (90 Base) MCG/ACT inhaler     mometasone-formoterol (DULERA) 100-5 MCG/ACT inhaler   11. Chronic lumbosacral pain M54.5     G89.29    12. Knee pain, unspecified chronicity, unspecified laterality M25.569    13. Vitamin D deficiency E55.9 Cholecalciferol (VITAMIN D3) 2000 units CAPS     Vitamin D Deficiency   14. Anemia, unspecified type D64.9 Vitamin B12     Ferritin     CBC with platelets differential     Iron and iron binding capacity   15. Morbid obesity (H) E66.01 BARIATRIC ADULT REFERRAL   16. Cellulitis of back L03.312 cephALEXin (KEFLEX) 500 MG capsule       RECOMMENDATIONS:     Cardiovascular Risk  Performs 4 METs exercise without symptoms (Climb a flight of stairs and Walk on level ground at 15 minutes  per mile (4 miles/hour)) .     Pulmonary Risk  Maximize asthma treatment      Obstructive Sleep Apnea (or suspected sleep apnea)  Patient is to bring their home CPAP with them on the day of surgery    Anemia  Anemia and does not require treatment prior to surgery.  Monitor Hemoglobin postoperatively.      --Patient is to take all scheduled medications on the day of surgery EXCEPT for modifications listed below.    Anticoagulant or Antiplatelet Medication Use  NSAIDS: Ibuprofen (Motrin):         Stop five days prior to surgery  Naproxen (Naprosyn):   Stop five days prior to surgery      APPROVAL GIVEN to proceed with proposed procedure, without further diagnostic evaluation     The information in this document, created by the medical scribe for me, accurately reflects the services I personally performed and the decisions made by me. I have reviewed and approved this document for accuracy prior to leaving the patient care area.  December 31, 2018 11:28 AM    Signed Electronically by: Kory Hudson MD    Copy of this evaluation report is provided to requesting physician.    [place] Preop Guidelines    Revised Cardiac Risk Index    Injectable Influenza Immunization Documentation    1.  Is the person to be vaccinated sick today?   No    2. Does the person to be vaccinated have an allergy to a component   of the vaccine?   No  Egg Allergy Algorithm Link    3. Has the person to be vaccinated ever had a serious reaction   to influenza vaccine in the past?   No    4. Has the person to be vaccinated ever had Guillain-Barré syndrome?   No    Form completed by Brandon Gutierrez CMA

## 2019-01-01 LAB
ALBUMIN SERPL-MCNC: 3.9 G/DL (ref 3.4–5)
ALP SERPL-CCNC: 68 U/L (ref 40–150)
ALT SERPL W P-5'-P-CCNC: 29 U/L (ref 0–70)
ANION GAP SERPL CALCULATED.3IONS-SCNC: 5 MMOL/L (ref 3–14)
AST SERPL W P-5'-P-CCNC: 18 U/L (ref 0–45)
BILIRUB SERPL-MCNC: 0.6 MG/DL (ref 0.2–1.3)
BUN SERPL-MCNC: 12 MG/DL (ref 7–30)
CALCIUM SERPL-MCNC: 9.2 MG/DL (ref 8.5–10.1)
CHLORIDE SERPL-SCNC: 104 MMOL/L (ref 94–109)
CHOLEST SERPL-MCNC: 175 MG/DL
CO2 SERPL-SCNC: 29 MMOL/L (ref 20–32)
CREAT SERPL-MCNC: 0.84 MG/DL (ref 0.66–1.25)
FERRITIN SERPL-MCNC: 90 NG/ML (ref 26–388)
GFR SERPL CREATININE-BSD FRML MDRD: >90 ML/MIN/{1.73_M2}
GLUCOSE SERPL-MCNC: 90 MG/DL (ref 70–99)
HDLC SERPL-MCNC: 45 MG/DL
IRON SATN MFR SERPL: 35 % (ref 15–46)
IRON SERPL-MCNC: 113 UG/DL (ref 35–180)
LDLC SERPL CALC-MCNC: 118 MG/DL
NONHDLC SERPL-MCNC: 130 MG/DL
POTASSIUM SERPL-SCNC: 4 MMOL/L (ref 3.4–5.3)
PROT SERPL-MCNC: 7.6 G/DL (ref 6.8–8.8)
SODIUM SERPL-SCNC: 138 MMOL/L (ref 133–144)
TIBC SERPL-MCNC: 324 UG/DL (ref 240–430)
TRIGL SERPL-MCNC: 62 MG/DL

## 2019-01-02 LAB — DEPRECATED CALCIDIOL+CALCIFEROL SERPL-MC: 26 UG/L (ref 20–75)

## 2019-01-02 NOTE — NURSING NOTE
Fax 11 page OV to [phone] to [facility] in [place], may be part of [facility] at 9am on 1/2/2019.Brandon Gutierrez CMA

## 2019-01-10 ASSESSMENT — MIFFLIN-ST. JEOR
SCORE: 2629.05
SCORE: 2629.05

## 2019-01-15 ENCOUNTER — ANESTHESIA - HEALTHEAST (OUTPATIENT)
Dept: SURGERY | Facility: CLINIC | Age: 39
End: 2019-01-15

## 2019-01-16 ENCOUNTER — TRANSFERRED RECORDS (OUTPATIENT)
Dept: HEALTH INFORMATION MANAGEMENT | Facility: CLINIC | Age: 39
End: 2019-01-16

## 2019-01-16 ENCOUNTER — SURGERY - HEALTHEAST (OUTPATIENT)
Dept: SURGERY | Facility: CLINIC | Age: 39
End: 2019-01-16

## 2019-01-16 ENCOUNTER — HOSPITAL ENCOUNTER (OUTPATIENT)
Dept: SURGERY | Facility: CLINIC | Age: 39
Discharge: HOME | End: 2019-01-16
Attending: UROLOGY | Admitting: UROLOGY
Payer: COMMERCIAL

## 2019-01-16 DIAGNOSIS — K59.01 SLOW TRANSIT CONSTIPATION: ICD-10-CM

## 2019-01-16 ASSESSMENT — MIFFLIN-ST. JEOR
SCORE: 2629.05
SCORE: 2629.05

## 2019-01-16 NOTE — TELEPHONE ENCOUNTER
FIBER LAXATIVE 0.52 g capsule      Last Written Prescription Date:  12/18/2018  Last Fill Quantity: 28 capsule,   # refills: 0  Last Office Visit: 12/31/2018  Future Office visit:       Routing refill request to provider for review/approval because:  Drug not on the [facility], [facility] or [facility] refill protocol or controlled substance    Santiago AGUILAR

## 2019-01-16 NOTE — TELEPHONE ENCOUNTER
My chart sent to see if pt want's RX on going as this was filled for one time fill on 12/28    Eliana Concepcion RN

## 2019-02-12 DIAGNOSIS — K59.01 SLOW TRANSIT CONSTIPATION: ICD-10-CM

## 2019-02-12 NOTE — TELEPHONE ENCOUNTER
Last Written Prescription Date:  1/24/19  Last Fill Quantity: 28,  # refills: 0   Last office visit: 12/31/2018 with prescribing provider:  Tristan   Future Office Visit:    Requested Prescriptions   Pending Prescriptions Disp Refills     FIBER LAXATIVE 0.52 g capsule [Pharmacy Med Name: FIBER 520MG CAP] 28 capsule 0     Sig: TAKE 1 CAPSULE BY MOUTH DAILY    There is no refill protocol information for this order        Prescription approved per [facility] Refill Protocol.  Anupama Ross RN, BSN

## 2019-05-17 ENCOUNTER — HOSPITAL ENCOUNTER (EMERGENCY)
Facility: CLINIC | Age: 39
Discharge: HOME | End: 2019-05-18
Attending: EMERGENCY MEDICINE | Admitting: EMERGENCY MEDICINE
Payer: COMMERCIAL

## 2019-05-17 DIAGNOSIS — Z96.649 DISLOCATION OF HIP PROSTHESIS, INITIAL ENCOUNTER (H): ICD-10-CM

## 2019-05-17 DIAGNOSIS — T84.029A DISLOCATION OF HIP PROSTHESIS, INITIAL ENCOUNTER (H): ICD-10-CM

## 2019-05-17 PROCEDURE — 99152 MOD SED SAME PHYS/QHP 5/>YRS: CPT

## 2019-05-17 PROCEDURE — 99285 EMERGENCY DEPT VISIT HI MDM: CPT | Mod: 25

## 2019-05-17 PROCEDURE — 27265 TREAT HIP DISLOCATION: CPT | Mod: LT

## 2019-05-17 PROCEDURE — 25000128 H RX IP 250 OP 636: Performed by: EMERGENCY MEDICINE

## 2019-05-17 PROCEDURE — 96375 TX/PRO/DX INJ NEW DRUG ADDON: CPT

## 2019-05-17 PROCEDURE — 96374 THER/PROPH/DIAG INJ IV PUSH: CPT

## 2019-05-17 RX ORDER — KETOROLAC TROMETHAMINE 15 MG/ML
15 INJECTION, SOLUTION INTRAMUSCULAR; INTRAVENOUS ONCE
Status: COMPLETED | OUTPATIENT
Start: 2019-05-17 | End: 2019-05-17

## 2019-05-17 RX ORDER — ONDANSETRON 2 MG/ML
4 INJECTION INTRAMUSCULAR; INTRAVENOUS ONCE
Status: COMPLETED | OUTPATIENT
Start: 2019-05-17 | End: 2019-05-17

## 2019-05-17 RX ADMIN — HYDROMORPHONE HYDROCHLORIDE 1 MG: 1 INJECTION, SOLUTION INTRAMUSCULAR; INTRAVENOUS; SUBCUTANEOUS at 23:52

## 2019-05-17 RX ADMIN — KETOROLAC TROMETHAMINE 15 MG: 15 INJECTION, SOLUTION INTRAMUSCULAR; INTRAVENOUS at 23:50

## 2019-05-17 RX ADMIN — ONDANSETRON 4 MG: 2 INJECTION INTRAMUSCULAR; INTRAVENOUS at 23:54

## 2019-05-17 ASSESSMENT — MIFFLIN-ST. JEOR: SCORE: 2625.44

## 2019-05-17 ASSESSMENT — ENCOUNTER SYMPTOMS: NUMBNESS: 0

## 2019-05-17 NOTE — ED AVS SNAPSHOT
[facility] Emergency Department  [address]  [facility] [address]  Phone:  [phone]  Fax:  [phone]                                    Kimo Greenwood   MRN: [id number]    Department:  [facility] Emergency Department   Date of Visit:  5/17/2019           After Visit Summary Signature Page    I have received my discharge instructions, and my questions have been answered. I have discussed any challenges I see with this plan with the nurse or doctor.    ..........................................................................................................................................  Patient/Patient Representative Signature      ..........................................................................................................................................  Patient Representative Print Name and Relationship to Patient    ..................................................               ................................................  Date                                   Time    ..........................................................................................................................................  Reviewed by Signature/Title    ...................................................              ..............................................  Date                                               Time          22EPIC Rev 08/18

## 2019-05-18 ENCOUNTER — APPOINTMENT (OUTPATIENT)
Dept: GENERAL RADIOLOGY | Facility: CLINIC | Age: 39
End: 2019-05-18
Attending: EMERGENCY MEDICINE
Payer: COMMERCIAL

## 2019-05-18 VITALS
OXYGEN SATURATION: 97 % | TEMPERATURE: 98.4 F | HEART RATE: 81 BPM | SYSTOLIC BLOOD PRESSURE: 123 MMHG | DIASTOLIC BLOOD PRESSURE: 71 MMHG | RESPIRATION RATE: 16 BRPM | WEIGHT: 315 LBS | HEIGHT: 71 IN | BODY MASS INDEX: 44.1 KG/M2

## 2019-05-18 PROCEDURE — 73502 X-RAY EXAM HIP UNI 2-3 VIEWS: CPT

## 2019-05-18 PROCEDURE — 25000128 H RX IP 250 OP 636: Performed by: EMERGENCY MEDICINE

## 2019-05-18 PROCEDURE — 40000986 XR PELVIS AND HIP LEFT 2 VIEWS

## 2019-05-18 PROCEDURE — 96376 TX/PRO/DX INJ SAME DRUG ADON: CPT

## 2019-05-18 RX ORDER — PROPOFOL 10 MG/ML
200 INJECTION, EMULSION INTRAVENOUS ONCE
Status: COMPLETED | OUTPATIENT
Start: 2019-05-18 | End: 2019-05-18

## 2019-05-18 RX ORDER — PROPOFOL 10 MG/ML
INJECTION, EMULSION INTRAVENOUS
Status: DISCONTINUED
Start: 2019-05-18 | End: 2019-05-18 | Stop reason: WASHOUT

## 2019-05-18 RX ORDER — HYDROMORPHONE HYDROCHLORIDE 1 MG/ML
0.5 INJECTION, SOLUTION INTRAMUSCULAR; INTRAVENOUS; SUBCUTANEOUS
Status: DISCONTINUED | OUTPATIENT
Start: 2019-05-18 | End: 2019-05-18 | Stop reason: HOSPADM

## 2019-05-18 RX ADMIN — HYDROMORPHONE HYDROCHLORIDE 0.5 MG: 10 INJECTION, SOLUTION INTRAMUSCULAR; INTRAVENOUS; SUBCUTANEOUS at 00:50

## 2019-05-18 RX ADMIN — PROPOFOL 140 MG: 10 INJECTION, EMULSION INTRAVENOUS at 01:53

## 2019-05-18 NOTE — ED TRIAGE NOTES
States was getting in car when he felt a pop in left hip with extreme pain. Has a history of hip replacement on left hip.

## 2019-05-18 NOTE — ED PROVIDER NOTES
"  History     Chief Complaint:  Hip Pain    HPI   Kimo Greenwood is a 38 year old male with a left hip arthroscopy hip in 2017 who presents with left hip pain. The patient notes he was getting into the car when he heard a pop in his left hip with extreme pain.  The patient states he has been unable bear weight on the leg since that time, so his friend drove him here. The patient denies numbness in the leg.     Allergies:  Awoke angry after anesthesia      Medications:    Albuterol  Abilify  Hydrochlorothiazide   Lisinopril   Zofran   Protonix  Dulera  Hydrocortisone cream     Past Medical History:    Asthma  Patel's esophagus determined by biopsy   Chronic pain - left hip/leg pain   Hypertension   Marijuana abuse   MDD (major depressive disorder)   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)   Obesity   LOREN (obstructive sleep apnea)   Seborrheic dermatitis   H/o Suicidal ideation    Past Surgical History:    Arthroplasty hip  Bladder surgery   Fulgurate condyloma     Family History:    Mother: anxiety disorder, depression, ulcerative colitis    Social History:  Smoking Status: former smoker  Smokeless Tobacco: Never Used  Alcohol Use: Positive  Marital Status:  Single      Review of Systems   Musculoskeletal:        Left leg pain   Neurological: Negative for numbness.   All other systems reviewed and are negative.    Physical Exam     Patient Vitals for the past 24 hrs:   BP Temp Temp src Pulse Resp SpO2 Height Weight   05/18/19 0258 123/71 -- -- 81 16 97 % -- --   05/18/19 0200 123/71 -- -- 81 -- 98 % -- --   05/18/19 0145  108/99 -- -- 86 -- 95 % -- --   05/18/19 0130 131/78 -- -- 85 -- 98 % -- --   05/18/19 0115 127/79 -- -- 84 -- 97 % -- --   05/18/19 0100 132/85 -- -- 82 -- 97 % -- --   05/18/19 0000 142/87 -- -- -- -- 90 % -- --   05/17/19 2327 -- 98.4  F (36.9  C) Oral -- -- -- -- --   05/17/19 2322 136/78 -- -- 81 20 96 % 1.803 m (5' 11\") (!) 167.8 kg (370 lb)     Physical Exam  Nursing note and vitals " reviewed.  Constitutional: Cooperative.   HENT:   Mouth/Throat: Mucous membranes are normal.   Cardiovascular: Normal rate, regular rhythm and normal heart sounds.  No murmur.  Pulmonary/Chest: Effort normal and breath sounds normal. No respiratory distress. No wheezes. No rales.   Abdominal: Normal appearance  Musculoskeletal: Left leg is foreshortened and externally rotated. 2+ DP pulse  Neurological: Alert. Sensation in LLE normal.   Skin: Skin is warm and dry.   Psychiatric: Normal mood and affect.     Emergency Department Course   Imaging:  Radiology findings were communicated with the patient who voiced understanding of the findings.    XR Pelvis and Hip Left 2 Views  Superior dislocation of the left hip arthroplasty. No fracture. Spinal stimulator in place.  Reading per radiology    XR Pelvis and Hip Left 2 Views (post reduction)  Successful reduction of the left hip arthroplasty. No fracture.  Reading per radiology    Procedures:  .[facility] Procedure Note        Sedation:      Performed by: Marlo Chaudhry MD    Pre-Procedure Assessment done at 0100.    Expected Level:  Moderate Sedation    Indication:  Sedation is required to allow for Joint reduction    Consent obtained from patient after discussing the risks, benefits and alternatives.    PO Intake:  Appropriately NPO for procedure    ASA Class:  Class 2 - MILD SYSTEMIC DISEASE, NO ACUTE PROBLEMS, NO FUNCTIONAL LIMITATIONS.    Mallampati:  Grade 2:  Soft palate, base of uvula, tonsillar pillars, and portion of posterior pharyngeal wall visible    Lungs: Lungs Clear with good breath sounds bilaterally.     Heart: Normal heart sounds and rate    History and physical reviewed and no updates needed. I have reviewed the lab findings, diagnostic data, medications, and the plan for sedation. I have determined this patient to be an appropriate candidate for the planned sedation and procedure and have reassessed the patient IMMEDIATELY PRIOR to sedation and  procedure.      Sedation Post Procedure Summary:    Prior to the start of the procedure and with procedural staff participation, I verbally confirmed the patient s identity using two indicators, relevant allergies, that the procedure was appropriate and matched the consent or emergent situation, and that the correct equipment/implants were available. Immediately prior to starting the procedure I conducted the Time Out with the procedural staff and re-confirmed the patient s name, procedure, and site/side. (The Joint Commission universal protocol was followed.)  Yes      Sedatives: Propofol    Vital signs, airway, End Tidal CO2 and pulse oximetry were monitored and remained stable throughout the procedure and sedation was maintained until the procedure was complete.  The patient was monitored by staff until sedation discharge criteria were met.    Patient tolerance: Patient tolerated the procedure well with no immediate complications.    Time of sedation in minutes:  10 minutes from beginning to end of physician one to one monitoring.    PROCEDURE: Closed reduction of prosthetic left hip dislocation    Anesthesia:  The patient was consciously sedated by Dr. Chaudhry (please see above note)    Technique: Traction was applied and angulation was recreated and reduced.  Good alignment was confirmed by post reduction films.  The patient tolerated the procedure well and there were no complications.    Interventions:  2350 Toradol 15 mg IV  2352 Dilaudid 1 mg IV  2354 Zofran 4 mg IV  0050 Dilaudid 0.5 mg IV  0153 Propofol 140 mg IV (during sedation)    Emergency Department Course:  Nursing notes and vitals reviewed.    2331 I performed an exam of the patient as documented above.     0016 The patient was sent for a XR Pelvis while in the emergency department, results above.     0045 I returned to update the patient.    0117 I returned to update the patient about the reduction procedure.     0138 I preformed the sedation and  hip reduction as noted above.     0214 The patient was sent for a XR Pelvis while in the emergency department, results above.     0240 I personally reviewed the imaging results with the patient and answered all related questions prior to discharge.    Impression & Plan      Medical Decision Making:  Kimo Greenwood is a 38 year old male with a history of prosthetic left hip who presents with acute pain after hyperflexion. He unfortunately sustained a nontraumatic dislocation of his prosthesis. As per procedure not above, I was able to reduce this without difficulty. Repeat Xray shows successful reduction and his is ambulated without difficult. I have recommended follow up with his orthopedist this week for recheck and to avoid any motions that would result in hyperflexion of the hip.     Diagnosis:    ICD-10-CM    1. Dislocation of left hip prosthesis, initial encounter  T84.029A     Z96.649      Disposition:   The patient is discharged to home.    Discharge Medications:  No discharge medication.     Scribe Disclosure:  I, Smita Byers, am serving as a scribe at 11:30 PM on 5/17/2019 to document services personally performed by Marlo Chaudhry MD based on my observations and the provider's statements to me.  [facility] EMERGENCY DEPARTMENT       Marlo Chaudhry MD  05/18/19 0604

## 2019-05-21 ENCOUNTER — TRANSFERRED RECORDS (OUTPATIENT)
Dept: HEALTH INFORMATION MANAGEMENT | Facility: CLINIC | Age: 39
End: 2019-05-21

## 2019-06-04 DIAGNOSIS — K59.01 SLOW TRANSIT CONSTIPATION: ICD-10-CM

## 2019-06-04 NOTE — TELEPHONE ENCOUNTER
FIBER LAXATIVE 0.52 g capsule      Last Written Prescription Date:  02/12/2019  Last Fill Quantity: 28 CAPSULE,   # refills: 3  Last Office Visit: 12/31/2018  Future Office visit:       Routing refill request to provider for review/approval because:  Drug not on the [facility], [facility] or [facility] refill protocol or controlled substance      Santiago AGUILAR

## 2019-06-04 NOTE — TELEPHONE ENCOUNTER
Prescription approved per [facility] Refill Protocol.  Anupama Ross RN, BSN

## 2019-06-10 ENCOUNTER — APPOINTMENT (OUTPATIENT)
Dept: GENERAL RADIOLOGY | Facility: CLINIC | Age: 39
End: 2019-06-10
Attending: ORTHOPAEDIC SURGERY
Payer: COMMERCIAL

## 2019-06-10 ENCOUNTER — ANESTHESIA (OUTPATIENT)
Dept: SURGERY | Facility: CLINIC | Age: 39
End: 2019-06-10
Payer: COMMERCIAL

## 2019-06-10 ENCOUNTER — APPOINTMENT (OUTPATIENT)
Dept: GENERAL RADIOLOGY | Facility: CLINIC | Age: 39
End: 2019-06-10
Attending: EMERGENCY MEDICINE
Payer: COMMERCIAL

## 2019-06-10 ENCOUNTER — HOSPITAL ENCOUNTER (OUTPATIENT)
Facility: CLINIC | Age: 39
Discharge: HOME | End: 2019-06-10
Attending: EMERGENCY MEDICINE | Admitting: ORTHOPAEDIC SURGERY
Payer: COMMERCIAL

## 2019-06-10 ENCOUNTER — ANESTHESIA EVENT (OUTPATIENT)
Dept: SURGERY | Facility: CLINIC | Age: 39
End: 2019-06-10
Payer: COMMERCIAL

## 2019-06-10 ENCOUNTER — MYC MEDICAL ADVICE (OUTPATIENT)
Dept: FAMILY MEDICINE | Facility: CLINIC | Age: 39
End: 2019-06-10

## 2019-06-10 VITALS
SYSTOLIC BLOOD PRESSURE: 142 MMHG | OXYGEN SATURATION: 98 % | HEART RATE: 75 BPM | WEIGHT: 315 LBS | HEIGHT: 71 IN | TEMPERATURE: 97.3 F | DIASTOLIC BLOOD PRESSURE: 83 MMHG | BODY MASS INDEX: 44.1 KG/M2 | RESPIRATION RATE: 12 BRPM

## 2019-06-10 DIAGNOSIS — Z96.642 STATUS POST TOTAL REPLACEMENT OF LEFT HIP: Primary | ICD-10-CM

## 2019-06-10 DIAGNOSIS — S73.005A CLOSED DISLOCATION OF LEFT HIP, INITIAL ENCOUNTER (H): Primary | ICD-10-CM

## 2019-06-10 DIAGNOSIS — T84.098A MECHANICAL COMPLICATION OF PROSTHETIC HIP IMPLANT, INITIAL ENCOUNTER (H): ICD-10-CM

## 2019-06-10 DIAGNOSIS — Z96.649 MECHANICAL COMPLICATION OF PROSTHETIC HIP IMPLANT, INITIAL ENCOUNTER (H): ICD-10-CM

## 2019-06-10 PROCEDURE — 37000009 ZZH ANESTHESIA TECHNICAL FEE, EACH ADDTL 15 MIN: Performed by: ORTHOPAEDIC SURGERY

## 2019-06-10 PROCEDURE — 73502 X-RAY EXAM HIP UNI 2-3 VIEWS: CPT

## 2019-06-10 PROCEDURE — 25800030 ZZH RX IP 258 OP 636: Performed by: ANESTHESIOLOGY

## 2019-06-10 PROCEDURE — 27265 TREAT HIP DISLOCATION: CPT | Mod: LT

## 2019-06-10 PROCEDURE — 99152 MOD SED SAME PHYS/QHP 5/>YRS: CPT

## 2019-06-10 PROCEDURE — 71000012 ZZH RECOVERY PHASE 1 LEVEL 1 FIRST HR: Performed by: ORTHOPAEDIC SURGERY

## 2019-06-10 PROCEDURE — 96376 TX/PRO/DX INJ SAME DRUG ADON: CPT | Mod: 59

## 2019-06-10 PROCEDURE — 99285 EMERGENCY DEPT VISIT HI MDM: CPT | Mod: 25

## 2019-06-10 PROCEDURE — 37000008 ZZH ANESTHESIA TECHNICAL FEE, 1ST 30 MIN: Performed by: ORTHOPAEDIC SURGERY

## 2019-06-10 PROCEDURE — 36000054 ZZH SURGERY LEVEL 2 W FLUORO 1ST 30 MIN: Performed by: ORTHOPAEDIC SURGERY

## 2019-06-10 PROCEDURE — 25000128 H RX IP 250 OP 636: Performed by: EMERGENCY MEDICINE

## 2019-06-10 PROCEDURE — 25000128 H RX IP 250 OP 636: Performed by: NURSE ANESTHETIST, CERTIFIED REGISTERED

## 2019-06-10 PROCEDURE — 25000128 H RX IP 250 OP 636: Performed by: PHYSICIAN ASSISTANT

## 2019-06-10 PROCEDURE — 96374 THER/PROPH/DIAG INJ IV PUSH: CPT | Mod: 59

## 2019-06-10 PROCEDURE — 96375 TX/PRO/DX INJ NEW DRUG ADDON: CPT

## 2019-06-10 PROCEDURE — 40000306 ZZH STATISTIC PRE PROC ASSESS II: Performed by: ORTHOPAEDIC SURGERY

## 2019-06-10 PROCEDURE — 71000027 ZZH RECOVERY PHASE 2 EACH 15 MINS: Performed by: ORTHOPAEDIC SURGERY

## 2019-06-10 PROCEDURE — 40000986 XR PELVIS 1/2 VW

## 2019-06-10 PROCEDURE — 40000277 XR SURGERY CARM FLUORO LESS THAN 5 MIN W STILLS: Mod: TC

## 2019-06-10 PROCEDURE — 40000275 ZZH STATISTIC RCP TIME EA 10 MIN

## 2019-06-10 PROCEDURE — 25000125 ZZHC RX 250: Performed by: NURSE ANESTHETIST, CERTIFIED REGISTERED

## 2019-06-10 RX ORDER — LIDOCAINE HYDROCHLORIDE 10 MG/ML
INJECTION, SOLUTION INFILTRATION; PERINEURAL PRN
Status: DISCONTINUED | OUTPATIENT
Start: 2019-06-10 | End: 2019-06-10

## 2019-06-10 RX ORDER — ACETAMINOPHEN 325 MG/1
650 TABLET ORAL
Status: DISCONTINUED | OUTPATIENT
Start: 2019-06-10 | End: 2019-06-10 | Stop reason: HOSPADM

## 2019-06-10 RX ORDER — HYDROMORPHONE HYDROCHLORIDE 1 MG/ML
0.5 INJECTION, SOLUTION INTRAMUSCULAR; INTRAVENOUS; SUBCUTANEOUS EVERY 30 MIN PRN
Status: DISCONTINUED | OUTPATIENT
Start: 2019-06-10 | End: 2019-06-10 | Stop reason: HOSPADM

## 2019-06-10 RX ORDER — SODIUM CHLORIDE, SODIUM LACTATE, POTASSIUM CHLORIDE, CALCIUM CHLORIDE 600; 310; 30; 20 MG/100ML; MG/100ML; MG/100ML; MG/100ML
INJECTION, SOLUTION INTRAVENOUS CONTINUOUS
Status: DISCONTINUED | OUTPATIENT
Start: 2019-06-10 | End: 2019-06-10 | Stop reason: HOSPADM

## 2019-06-10 RX ORDER — ONDANSETRON 2 MG/ML
4 INJECTION INTRAMUSCULAR; INTRAVENOUS EVERY 30 MIN PRN
Status: DISCONTINUED | OUTPATIENT
Start: 2019-06-10 | End: 2019-06-10 | Stop reason: HOSPADM

## 2019-06-10 RX ORDER — ONDANSETRON 4 MG/1
4 TABLET, ORALLY DISINTEGRATING ORAL EVERY 30 MIN PRN
Status: DISCONTINUED | OUTPATIENT
Start: 2019-06-10 | End: 2019-06-10 | Stop reason: HOSPADM

## 2019-06-10 RX ORDER — CEPHALEXIN 500 MG/1
500 CAPSULE ORAL 2 TIMES DAILY
Qty: 20 CAPSULE | Refills: 0 | Status: SHIPPED | OUTPATIENT
Start: 2019-06-10 | End: 2019-06-22

## 2019-06-10 RX ORDER — OXYCODONE HYDROCHLORIDE 5 MG/1
5-10 TABLET ORAL EVERY 4 HOURS PRN
Qty: 15 TABLET | Refills: 0 | Status: ON HOLD | OUTPATIENT
Start: 2019-06-10 | End: 2019-06-24

## 2019-06-10 RX ORDER — PROPOFOL 10 MG/ML
200 INJECTION, EMULSION INTRAVENOUS ONCE
Status: COMPLETED | OUTPATIENT
Start: 2019-06-10 | End: 2019-06-10

## 2019-06-10 RX ORDER — ONDANSETRON 4 MG/1
4 TABLET, ORALLY DISINTEGRATING ORAL
Status: DISCONTINUED | OUTPATIENT
Start: 2019-06-10 | End: 2019-06-10 | Stop reason: HOSPADM

## 2019-06-10 RX ORDER — HYDROMORPHONE HYDROCHLORIDE 1 MG/ML
.3-.5 INJECTION, SOLUTION INTRAMUSCULAR; INTRAVENOUS; SUBCUTANEOUS EVERY 5 MIN PRN
Status: DISCONTINUED | OUTPATIENT
Start: 2019-06-10 | End: 2019-06-10 | Stop reason: HOSPADM

## 2019-06-10 RX ORDER — ONDANSETRON 4 MG/1
4 TABLET, ORALLY DISINTEGRATING ORAL EVERY 8 HOURS PRN
Qty: 10 TABLET | Refills: 0 | Status: ON HOLD | OUTPATIENT
Start: 2019-06-10 | End: 2019-06-24

## 2019-06-10 RX ORDER — ACETAMINOPHEN 500 MG
TABLET ORAL
COMMUNITY
Start: 2018-08-24 | End: 2019-08-08

## 2019-06-10 RX ORDER — KETOROLAC TROMETHAMINE 15 MG/ML
15 INJECTION, SOLUTION INTRAMUSCULAR; INTRAVENOUS ONCE
Status: COMPLETED | OUTPATIENT
Start: 2019-06-10 | End: 2019-06-10

## 2019-06-10 RX ORDER — ONDANSETRON 2 MG/ML
INJECTION INTRAMUSCULAR; INTRAVENOUS PRN
Status: DISCONTINUED | OUTPATIENT
Start: 2019-06-10 | End: 2019-06-10

## 2019-06-10 RX ORDER — HYDROXYZINE HYDROCHLORIDE 25 MG/1
25 TABLET, FILM COATED ORAL
Status: DISCONTINUED | OUTPATIENT
Start: 2019-06-10 | End: 2019-06-10 | Stop reason: HOSPADM

## 2019-06-10 RX ORDER — LIDOCAINE 40 MG/G
CREAM TOPICAL
Status: DISCONTINUED | OUTPATIENT
Start: 2019-06-10 | End: 2019-06-10 | Stop reason: HOSPADM

## 2019-06-10 RX ORDER — NALOXONE HYDROCHLORIDE 0.4 MG/ML
.1-.4 INJECTION, SOLUTION INTRAMUSCULAR; INTRAVENOUS; SUBCUTANEOUS
Status: DISCONTINUED | OUTPATIENT
Start: 2019-06-10 | End: 2019-06-10 | Stop reason: HOSPADM

## 2019-06-10 RX ORDER — PROPOFOL 10 MG/ML
INJECTION, EMULSION INTRAVENOUS PRN
Status: DISCONTINUED | OUTPATIENT
Start: 2019-06-10 | End: 2019-06-10

## 2019-06-10 RX ORDER — CEFAZOLIN SODIUM 2 G/100ML
2 INJECTION, SOLUTION INTRAVENOUS ONCE
Status: COMPLETED | OUTPATIENT
Start: 2019-06-10 | End: 2019-06-10

## 2019-06-10 RX ORDER — TRAZODONE HYDROCHLORIDE 50 MG/1
50 TABLET, FILM COATED ORAL AT BEDTIME
Status: ON HOLD | COMMUNITY
Start: 2018-11-03 | End: 2019-06-24

## 2019-06-10 RX ORDER — LABETALOL 20 MG/4 ML (5 MG/ML) INTRAVENOUS SYRINGE
10
Status: DISCONTINUED | OUTPATIENT
Start: 2019-06-10 | End: 2019-06-10 | Stop reason: HOSPADM

## 2019-06-10 RX ORDER — FENTANYL CITRATE 50 UG/ML
25-50 INJECTION, SOLUTION INTRAMUSCULAR; INTRAVENOUS
Status: DISCONTINUED | OUTPATIENT
Start: 2019-06-10 | End: 2019-06-10 | Stop reason: HOSPADM

## 2019-06-10 RX ORDER — FENTANYL CITRATE 50 UG/ML
INJECTION, SOLUTION INTRAMUSCULAR; INTRAVENOUS PRN
Status: DISCONTINUED | OUTPATIENT
Start: 2019-06-10 | End: 2019-06-10

## 2019-06-10 RX ORDER — GLYCOPYRROLATE 0.2 MG/ML
INJECTION, SOLUTION INTRAMUSCULAR; INTRAVENOUS PRN
Status: DISCONTINUED | OUTPATIENT
Start: 2019-06-10 | End: 2019-06-10

## 2019-06-10 RX ORDER — OXYCODONE HYDROCHLORIDE 5 MG/1
5 TABLET ORAL
Status: DISCONTINUED | OUTPATIENT
Start: 2019-06-10 | End: 2019-06-10 | Stop reason: HOSPADM

## 2019-06-10 RX ADMIN — KETOROLAC TROMETHAMINE 15 MG: 15 INJECTION, SOLUTION INTRAMUSCULAR; INTRAVENOUS at 03:14

## 2019-06-10 RX ADMIN — PROPOFOL 300 MG: 10 INJECTION, EMULSION INTRAVENOUS at 07:42

## 2019-06-10 RX ADMIN — FENTANYL CITRATE 100 MCG: 50 INJECTION, SOLUTION INTRAMUSCULAR; INTRAVENOUS at 07:42

## 2019-06-10 RX ADMIN — CEFAZOLIN SODIUM 2 G: 2 INJECTION, SOLUTION INTRAVENOUS at 10:17

## 2019-06-10 RX ADMIN — LIDOCAINE HYDROCHLORIDE 50 MG: 10 INJECTION, SOLUTION INFILTRATION; PERINEURAL at 07:42

## 2019-06-10 RX ADMIN — HYDROMORPHONE HYDROCHLORIDE 1 MG: 1 INJECTION, SOLUTION INTRAMUSCULAR; INTRAVENOUS; SUBCUTANEOUS at 03:14

## 2019-06-10 RX ADMIN — HYDROMORPHONE HYDROCHLORIDE 0.5 MG: 1 INJECTION, SOLUTION INTRAMUSCULAR; INTRAVENOUS; SUBCUTANEOUS at 04:09

## 2019-06-10 RX ADMIN — GLYCOPYRROLATE 0.3 MG: 0.2 INJECTION, SOLUTION INTRAMUSCULAR; INTRAVENOUS at 07:42

## 2019-06-10 RX ADMIN — PROPOFOL 270 MG: 10 INJECTION, EMULSION INTRAVENOUS at 04:36

## 2019-06-10 RX ADMIN — HYDROMORPHONE HYDROCHLORIDE 0.5 MG: 1 INJECTION, SOLUTION INTRAMUSCULAR; INTRAVENOUS; SUBCUTANEOUS at 03:47

## 2019-06-10 RX ADMIN — PROPOFOL: 10 INJECTION, EMULSION INTRAVENOUS at 04:40

## 2019-06-10 RX ADMIN — Medication 140 MG: at 07:42

## 2019-06-10 RX ADMIN — SODIUM CHLORIDE, POTASSIUM CHLORIDE, SODIUM LACTATE AND CALCIUM CHLORIDE: 600; 310; 30; 20 INJECTION, SOLUTION INTRAVENOUS at 07:36

## 2019-06-10 RX ADMIN — ONDANSETRON 4 MG: 2 INJECTION INTRAMUSCULAR; INTRAVENOUS at 07:42

## 2019-06-10 ASSESSMENT — MIFFLIN-ST. JEOR: SCORE: 2625.44

## 2019-06-10 ASSESSMENT — LIFESTYLE VARIABLES: TOBACCO_USE: 1

## 2019-06-10 ASSESSMENT — ENCOUNTER SYMPTOMS
SEIZURES: 0
DYSRHYTHMIAS: 0
ARTHRALGIAS: 1
NUMBNESS: 0

## 2019-06-10 NOTE — OP NOTE
[facility]    Orthopedic Operative Note    Kimo Greenwood  [id number]    YOB: 1980   Procedure Date:  6/10/2019  Age: 38 year old       PREOPERATIVE DIAGNOSIS:  Left recurrent prosthetic hip dislocation     POSTOPERATIVE DIAGNOSIS:  Left recurrent prosthetic hip dislocation     PROCEDURE PERFORMED:  Closed reduction under general anesthesia left recurrent prosthetic hip dislocation     SURGEON:  Rafat Cazares MD    FIRST ASSISTANT:  Sherice Fournier PA-C. Her assistance was critical during transfer, positioning, preparation, draping, surgical approach, fracture reduction, implant placement, closure and placement of dressings. Her assistance allowed me to operate efficiently, decreasing surgical time and risk.     ANESTHESIA:  General    EBL: None    COMPLICATIONS: None     DISPOSITION: Post Anesthesia Care Unit     CONDITION: Stable     INDICATIONS:  Mart Camarillo Sr. is a 55 year old male who is status left total hip arthroplasty on 1/25/2017 by Dr. Randall who is presenting with recurrent left prosthetic hip posterior superior dislocation.  Patient sustained a previous dislocation approximately 1 month ago on 5/17/2019 which underwent successful closed reduction in the emergency department per report.  Patient is status post discussed closed versus open reduction.  Discussed the possibility of periprosthetic fracture during reduction.  Discussed the possibility of the need to convert from closed open reduction if closed reduction is not successful.  Discussed the risks of bleeding, infection nerve damage.  Patient verbalized understanding and elects to proceed.  Signed informed consent was obtained from his mother who is his legal guardian and placed in the chart. Of note, I also discussed the case with Dr. Randall,  who will see the patient in follow-up.      PROCEDURE: Patient was identified in the preoperative holding area and the left hip was marked with indelible marker.   Patient was brought in the operating room and underwent induction of general anesthesia.  Patient was subsequently transferred in a supine position to the radiolucent operating room table.  Timeout completed.  Utilizing a combination of hip flexion abduction and internal rotation with anteriorly directed traction a palpable clunk was noted suggesting successful reduction of the hip.  Fluoroscopic unit was brought into the field confirming concentric reduction on biplanar fluoroscopy with no evidence of periprosthetic fracture..  Patient placed in knee immobilizer and awakened from general anesthesia.  No complications and patient tolerated procedure well.    PLAN:  Weight-bear as tolerated  Posterior hip precautions  Knee immobilizer at all times to prevent abduction, internal rotation and hip flexion combination which could contribute to dislocation  Follow-up within 1 week with Dr. Randall  Of note, the patient appeared to have blanching erythema in the pretibial area distally around some superficial abrasions.  We therefore administered 1 dose of IV Ancef in the PACU and will discharge patient on Keflex.    Rafat Cazares MD  Orthopedic Trauma and Arthroplasty  [facility]  [phone]

## 2019-06-10 NOTE — TELEPHONE ENCOUNTER
New my chart message to fax rx to [facility] in [place] [phone].  CINDY Randhawa

## 2019-06-10 NOTE — PROVIDER NOTIFICATION
Hx hypertension and on hydrochlorothiazide.  Dr. Gan notified; okay to proceed with procedure without lab draws.

## 2019-06-10 NOTE — ANESTHESIA CARE TRANSFER NOTE
Patient: Kimo Greewnood    Procedure(s):  CLOSED REDUCTION, LEFT    Diagnosis: left hip dislocated  Diagnosis Additional Information: No value filed.    Anesthesia Type:   General, RSI, ETT     Note:  Airway :Face Mask  Patient transferred to:PACU  Handoff Report: Identifed the Patient, Identified the Reponsible Provider, Reviewed the pertinent medical history, Discussed the surgical course, Reviewed Intra-OP anesthesia mangement and issues during anesthesia, Set expectations for post-procedure period and Allowed opportunity for questions and acknowledgement of understanding      Vitals: (Last set prior to Anesthesia Care Transfer)    CRNA VITALS  6/10/2019 0732 - 6/10/2019 0810      6/10/2019             Pulse:  91    SpO2:  100 %    Resp Rate (observed):  2  (Abnormal)                 Electronically Signed By: LAMBERT Agustin CRNA  Susan 10, 2019  8:10 AM

## 2019-06-10 NOTE — ED NOTES
Bed: ED02  Expected date:   Expected time:   Means of arrival:   Comments:  miles 598 38 m hip dislocation

## 2019-06-10 NOTE — ANESTHESIA PREPROCEDURE EVALUATION
Anesthesia Pre-Procedure Evaluation    Patient: Kimo Greenwood   MRN: [id number] : 1980          Preoperative Diagnosis: left hip dislocated    Procedure(s):  CLOSED REDUCTION, LEFT    Past Medical History:   Diagnosis Date     Asthma      Chronic pain - left hip/leg pain      Hypertension      Marijuana abuse      MDD (major depressive disorder)      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) 2010    With associated speech/language delay     Obesity 2010     LOREN (obstructive sleep apnea)      Seborrheic dermatitis      Past Surgical History:   Procedure Laterality Date     ARTHROPLASTY HIP Left 2017    Procedure: ARTHROPLASTY HIP;  Surgeon: Bala Randall MD;  Location: [facility] OR     BLADDER SURGERY      [facility], [facility],Interstem stimulator implant     COLONOSCOPY N/A 10/30/2015    Procedure: COMBINED COLONOSCOPY, SINGLE OR MULTIPLE BIOPSY/POLYPECTOMY BY BIOPSY;  Surgeon: Alexis Jackson MD;  Location:  GI     COLONOSCOPY N/A 2016    Procedure: COMBINED COLONOSCOPY, SINGLE OR MULTIPLE BIOPSY/POLYPECTOMY BY BIOPSY;  Surgeon: Cat Huber MD;  Location: [facility] GI     ESOPHAGOSCOPY, GASTROSCOPY, DUODENOSCOPY (EGD), COMBINED N/A 2016    Procedure: COMBINED ESOPHAGOSCOPY, GASTROSCOPY, DUODENOSCOPY (EGD), BIOPSY SINGLE OR MULTIPLE;  Surgeon: Cat Huber MD;  Location: [facility] GI     EXAM UNDER ANESTHESIA, FULGURATE CONDYLOMA ANUS, COMBINED N/A 2016    Procedure: COMBINED EXAM UNDER ANESTHESIA, FULGURATE CONDYLOMA ANUS;  Surgeon: Nya Cabello MD;  Location: [facility] OR     Anesthesia Evaluation     .             ROS/MED HX    ENT/Pulmonary:     (+)sleep apnea, tobacco use, Intermittent asthma , . .    Neurologic:     (+)Developmental delay     (-) seizures and DementiaOther neuro hx: Mild.   Cardiovascular:     (+) Dyslipidemia, hypertension----. : . . . :. .      (-) CAD and arrhythmias   METS/Exercise Tolerance:     Hematologic:         (-) anemia   Musculoskeletal:   (+) arthritis,  other musculoskeletal- MARGO Hip Dislocation      GI/Hepatic:     (+) GERD      (-) hepatitis   Renal/Genitourinary:      (-) renal disease   Endo:     (+) Obesity, .   (-) Type I DM, Type II DM, thyroid disease, chronic steroid usage and other endocrine disorder   Psychiatric:     (+) psychiatric history depression (Chronic MJ Use)      Infectious Disease:  - neg infectious disease ROS       Malignancy:      - no malignancy   Other:    (+) H/O Chronic Pain,                        Physical Exam      Airway   Mallampati: II  TM distance: >3 FB  Neck ROM: full    Dental     Cardiovascular   Rhythm and rate: regular and normal  (-) no murmur    Pulmonary    breath sounds clear to auscultation    Other findings: Lab Test        12/31/18 07/13/18 03/20/17      --          01/09/17                       1128          0735          1117           --           0849          WBC          7.7          8.3          7.7           --          10.1          HGB          13.5         13.1*        12.7*          < >        12.9*         MCV          84           85           85            --          87            PLT          248          242          246            < >        244           INR           --           --           --           --          0.92           < > = values in this interval not displayed.                  Lab Test        12/31/18 07/13/18 03/20/17                       1128          0735          1117          NA           138          140          140           POTASSIUM    4.0          3.6          4.0           CHLORIDE     104          108          104           CO2          29           24           27            BUN          12           13           16            CR           0.84         0.80         0.97          ANIONGAP     5            8            9             FILEMON          9.2          8.6          9.4           GLC          90  "          88           83                  Lab Results   Component Value Date    WBC 7.7 12/31/2018    HGB 13.5 12/31/2018    HCT 40.4 12/31/2018     12/31/2018    CRP 8.8 (H) 10/31/2016    SED 9 10/31/2016     12/31/2018    POTASSIUM 4.0 12/31/2018    CHLORIDE 104 12/31/2018    CO2 29 12/31/2018    BUN 12 12/31/2018    CR 0.84 12/31/2018    GLC 90 12/31/2018    FILEMON 9.2 12/31/2018    ALBUMIN 3.9 12/31/2018    PROTTOTAL 7.6 12/31/2018    ALT 29 12/31/2018    AST 18 12/31/2018    ALKPHOS 68 12/31/2018    BILITOTAL 0.6 12/31/2018    LIPASE 120 09/25/2015    INR 0.92 01/09/2017    TSH 2.86 07/13/2018    T4 1.38 04/10/2012       Preop Vitals  BP Readings from Last 3 Encounters:   06/10/19 149/85   05/18/19 123/71   12/31/18 128/86    Pulse Readings from Last 3 Encounters:   06/10/19 81   05/18/19 81   12/31/18 77      Resp Readings from Last 3 Encounters:   06/10/19 (!) 40   05/18/19 16   06/08/18 12    SpO2 Readings from Last 3 Encounters:   06/10/19 97%   05/18/19 97%   12/31/18 100%      Temp Readings from Last 1 Encounters:   06/10/19 97.7  F (36.5  C) (Oral)    Ht Readings from Last 1 Encounters:   06/10/19 1.803 m (5' 11\")      Wt Readings from Last 1 Encounters:   06/10/19 (!) 167.8 kg (370 lb)    Estimated body mass index is 51.6 kg/m  as calculated from the following:    Height as of this encounter: 1.803 m (5' 11\").    Weight as of this encounter: 167.8 kg (370 lb).       Anesthesia Plan      History & Physical Review  History and physical reviewed and following examination; no interval change.    ASA Status:  3 .        Plan for General, RSI and ETT with Propofol induction. Maintenance will be Balanced.    PONV prophylaxis:  Ondansetron (or other 5HT-3)       Postoperative Care  Postoperative pain management:  IV analgesics and Oral pain medications.      Consents  Anesthetic plan, risks, benefits and alternatives discussed with:  Patient..                 Marlo Gonsalves, " MD                    .

## 2019-06-10 NOTE — ED PROVIDER NOTES
History     Chief Complaint:  Hip pain    HPI   Kimo Greenwood is a 38 year old male smoker with a history of a left hip replacement, obstructive sleep apnea with a CPAP machine at home who presents via EMS with hip pain. EMS reports that the patient was relaxing at home and attempted to get up out of bed when he heard a pop in his left hip and felt the pop as well and then fell but braced himself on the bed and lowered himself to the ground and then called EMS to bring him here with pain to his left hip. He states this is consistent with the last hip dislocation he had several months ago.  He states that was successfully reduced here in the ED at that time. He denies any numbness, right leg problems, or ongoing lung issues.       Allergies:  NKDA    Medications:    Protonix  Zofran  Dulera  Lisinopril  Hydrodiuril  Voltaren  Vitamin D  Abilify  Ventolin     Past Medical History:    Asthma  Chronic pain  HTN  MDD  Obesity  LOREN  Seborrheic dermatitis    Past Surgical History:    Hip arthroplasty  Bladder surgery    Family History:    Anxiety  Cancer  Ulcerative colitis    Social History:  Former smoker: 0.5 ppd, 1 year  Positive for alcohol use.       Review of Systems   Musculoskeletal: Positive for arthralgias.   Neurological: Negative for numbness.   All other systems reviewed and are negative.      Physical Exam     Patient Vitals for the past 24 hrs:   BP Temp Temp src Pulse Heart Rate Resp SpO2 Height Weight   06/10/19 0540 -- -- -- -- -- -- 97 % -- --   06/10/19 0520 115/63 -- -- 72 -- -- 98 % -- --   06/10/19 0500 110/70 -- -- 76 -- -- -- -- --   06/10/19 0450 95/66 -- -- 71 69 (!) 40 95 % -- --   06/10/19 0445 126/58 -- -- 67 73 17 96 % -- --   06/10/19 0430 116/67 -- -- 75 79 (!) 34 97 % -- --   06/10/19 0425 -- -- -- -- 81 26 97 % -- --   06/10/19 0420 -- -- -- 89 85 10 97 % -- --   06/10/19 0415 -- -- -- 83 81 10 95 % -- --   06/10/19 0410 -- -- -- -- 72 10 96 % -- --   06/10/19 0405 -- -- -- -- 75  "10 97 % -- --   06/10/19 0400 121/85 -- -- 78 75 11 95 % -- --   06/10/19 0355 -- -- -- -- 78 13 94 % -- --   06/10/19 0312 -- 97.7  F (36.5  C) Oral -- -- -- -- -- --   06/10/19 0308 123/81 -- -- 76 76 18 98 % 1.803 m (5' 11\") (!) 167.8 kg (370 lb)         Physical Exam  Morbidly obese   HEENT:  mmm, no rhinorrhea  Neck: supple, no abnormal swelling  Lungs:  CTAB,  no resp distress  CV: rrr, no m/r/g, ppi  Abd: soft, nontender, nondistended, no rebound/masses/guarding/hsm  Ext: LLE short and ext rotated.  Painful ROM.  RLE unremarkable.  Distal CMS intact LLE.    Skin: warm, dry, well perfused, no rashes/bruising/lesions on exposed skin  Neuro: alert, MAEE, no gross motor or sensory deficits, gait stable  Psych: Normal mood, normal affect      Emergency Department Course     Imaging:  Radiographic findings were communicated with the patient and Admitting MD who voiced understanding of the findings.    XR pelvis and hip left 2 views  Superior dislocation of the left total hip arthroplasty.  No fracture. as per radiology    XR pelvis 1/2 views  Persistent superior dislocation of the left hip  arthroplasty. No fracture. as per radiology        Procedures:    [facility] Procedure Note        Sedation:      Performed by: JR Mario MD  Authorized by: JR Mario MD    Pre-Procedure Assessment done at 0330.    Expected Level:  Moderate Sedation    Indication:  Sedation is required to allow for joint reduction    Consent obtained from patient after discussing the risks, benefits and alternatives.    PO Intake:  Not NPO but emergent condition outweighs risk.    ASA Class:  Class 3 - SEVERE SYSTEMIC DISEASE, DEFINITE FUNCTIONAL LIMITATIONS.    Mallampati:  Grade 1:  Soft palate, uvula, tonsillar pillars, and posterior pharyngeal wall visible    Lungs: Lungs Clear with good breath sounds bilaterally.     Heart: Normal heart sounds and rate    History and physical reviewed and no updates needed. I have reviewed the lab " findings, diagnostic data, medications, and the plan for sedation. I have determined this patient to be an appropriate candidate for the planned sedation and procedure and have reassessed the patient IMMEDIATELY PRIOR to sedation and procedure.      Sedation Post Procedure Summary:    Prior to the start of the procedure and with procedural staff participation, I verbally confirmed the patient s identity using two indicators, relevant allergies, that the procedure was appropriate and matched the consent or emergent situation, and that the correct equipment/implants were available. Immediately prior to starting the procedure I conducted the Time Out with the procedural staff and re-confirmed the patient s name, procedure, and site/side. (The Joint Commission universal protocol was followed.)  Yes      Sedatives: Propofol    Vital signs, airway, End Tidal CO2 and pulse oximetry were monitored and remained stable throughout the procedure and sedation was maintained until the procedure was complete.  The patient was monitored by staff until sedation discharge criteria were met.    Patient tolerance: Patient tolerated the procedure well with no immediate complications.    Time of sedation in minutes:  20 minutes from beginning to end of physician one to one monitoring.      REDUCTION UNSUCCESSFUL     Interventions:    The patient's symptoms were improved with parenteral narcotics.  0314 Toradol 15 mg IV   Dilaudid 1 mg IV  0409 Dilaudid 0.5 mg IV      Emergency Department Course:    Nursing notes and vitals reviewed. (0308) I performed an exam of the patient as documented above.     IV inserted. Medicine administered as documented above. Blood drawn. This was sent to the lab for further testing, results above.    The patient was sent for a Pelvis XR while in the emergency department, findings above.     (0420) Attempt at reduction unsuccessful .    (0509)  I consulted with Dr. Cazares , orthopedic surgery. They are in  agreement to accept the patient for admission.    (3812) Rechecked patient and discussed plan going forward.     Findings and plan explained to the Patient who consents to admission. Discussed the patient with Dr. Cazares, who will admit the patient to a medical bed for further monitoring, evaluation, and treatment.      Impression & Plan      Medical Decision Makin-year-old male left hip arthroplasty 2017 here after dislocation.  He is morbidly obese and was unable to be relocated with procedural sedation here in the ED.  We will go to the operating room with orthopedics.  Expect her to be discharged from there.  Neurovascular status intact on arrival and then on recheck after reduction attempted.      Diagnosis:    ICD-10-CM    1. Closed dislocation of left hip, initial encounter (H) S73.005A        Disposition:  Admitted to inpatient bed under care of Dr. Cazares.     Scribe Disclosure:  Kenneth LÓPEZ, am serving as a scribe on 6/10/2019 at 3:09 AM to personally document services performed by Epifanio Martin, * based on my observations and the provider's statements to me.     Kenneth Castellano  6/10/2019   [facility] EMERGENCY DEPARTMENT       Epifanio Martin MD  06/10/19 [id number]

## 2019-06-10 NOTE — DISCHARGE INSTRUCTIONS
GENERAL ANESTHESIA OR SEDATION ADULT DISCHARGE INSTRUCTIONS   SPECIAL PRECAUTIONS FOR 24 HOURS AFTER SURGERY    IT IS NOT UNUSUAL TO FEEL LIGHT-HEADED OR FAINT, UP TO 24 HOURS AFTER SURGERY OR WHILE TAKING PAIN MEDICATION.  IF YOU HAVE THESE SYMPTOMS; SIT FOR A FEW MINUTES BEFORE STANDING AND HAVE SOMEONE ASSIST YOU WHEN YOU GET UP TO WALK OR USE THE BATHROOM.    YOU SHOULD REST AND RELAX FOR THE NEXT 24 HOURS AND YOU MUST MAKE ARRANGEMENTS TO HAVE SOMEONE STAY WITH YOU FOR AT LEAST 24 HOURS AFTER YOUR DISCHARGE.  AVOID HAZARDOUS AND STRENUOUS ACTIVITIES.  DO NOT MAKE IMPORTANT DECISIONS FOR 24 HOURS.    DO NOT DRIVE ANY VEHICLE OR OPERATE MECHANICAL EQUIPMENT FOR 24 HOURS FOLLOWING THE END OF YOUR SURGERY.  EVEN THOUGH YOU MAY FEEL NORMAL, YOUR REACTIONS MAY BE AFFECTED BY THE MEDICATION YOU HAVE RECEIVED.    DO NOT DRINK ALCOHOLIC BEVERAGES FOR 24 HOURS FOLLOWING YOUR SURGERY.    DRINK CLEAR LIQUIDS (APPLE JUICE, GINGER ALE, 7-UP, BROTH, ETC.).  PROGRESS TO YOUR REGULAR DIET AS YOU FEEL ABLE.    YOU MAY HAVE A DRY MOUTH, A SORE THROAT, MUSCLES ACHES OR TROUBLE SLEEPING.  THESE SHOULD GO AWAY AFTER 24 HOURS.    CALL YOUR DOCTOR FOR ANY OF THE FOLLOWING:  SIGNS OF INFECTION (FEVER, GROWING TENDERNESS AT THE SURGERY SITE, A LARGE AMOUNT OF DRAINAGE OR BLEEDING, SEVERE PAIN, FOUL-SMELLING DRAINAGE, REDNESS OR SWELLING.    IT HAS BEEN OVER 8 TO 10 HOURS SINCE SURGERY AND YOU ARE STILL NOT ABLE TO URINATE (PASS WATER).       Weight Bear as Tolerated with knee immobilizer on per MD orders

## 2019-06-10 NOTE — PROGRESS NOTES
Monitored respiratory status during conscious sedation.    Sylvia Zhagn RT on 6/10/2019 at 4:47 AM

## 2019-06-10 NOTE — ANESTHESIA POSTPROCEDURE EVALUATION
Patient: Kimo Greenwood    Procedure(s):  CLOSED REDUCTION, LEFT    Diagnosis:left hip dislocated  Diagnosis Additional Information: No value filed.    Anesthesia Type:  General, RSI, ETT    Note:  Anesthesia Post Evaluation    Patient location during evaluation: PACU  Patient participation: Able to fully participate in evaluation  Level of consciousness: awake  Pain management: adequate  Airway patency: patent  Cardiovascular status: acceptable  Respiratory status: acceptable  Hydration status: euvolemic  PONV: controlled     Anesthetic complications: None          Last vitals:  Vitals:    06/10/19 0820 06/10/19 0849 06/10/19 1100   BP: 134/71 122/69 142/83   Pulse: 74 75    Resp: 12 16 12   Temp: 97.5  F (36.4  C) 97.3  F (36.3  C)    SpO2: 93% 97% 98%         Electronically Signed By: Marlo Gonsalves MD  Susan 10, 2019  2:08 PM

## 2019-06-10 NOTE — ED TRIAGE NOTES
Pt arrives to the ED due to left hip pain. Pt states h/o of left hip replacement 2 years ago. Today pt was getting off the couch when he felt a pop and immediate pain. Pt unable to get up off the ground. CMS intact. H/o of hip dislocation a month ago.

## 2019-06-11 ENCOUNTER — HOSPITAL ENCOUNTER (OUTPATIENT)
Dept: LAB | Facility: CLINIC | Age: 39
Discharge: HOME | End: 2019-06-11
Attending: ORTHOPAEDIC SURGERY | Admitting: ORTHOPAEDIC SURGERY
Payer: COMMERCIAL

## 2019-06-11 ENCOUNTER — TRANSFERRED RECORDS (OUTPATIENT)
Dept: HEALTH INFORMATION MANAGEMENT | Facility: CLINIC | Age: 39
End: 2019-06-11

## 2019-06-11 DIAGNOSIS — Z47.89 AFTERCARE FOLLOWING SURGERY OF THE MUSCULOSKELETAL SYSTEM: ICD-10-CM

## 2019-06-11 DIAGNOSIS — Z47.89 AFTERCARE FOLLOWING SURGERY OF THE MUSCULOSKELETAL SYSTEM: Primary | ICD-10-CM

## 2019-06-11 LAB
BASOPHILS # BLD AUTO: 0 10E9/L (ref 0–0.2)
BASOPHILS NFR BLD AUTO: 0.3 %
CRP SERPL-MCNC: 20.3 MG/L (ref 0–8)
DIFFERENTIAL METHOD BLD: ABNORMAL
EOSINOPHIL # BLD AUTO: 0.3 10E9/L (ref 0–0.7)
EOSINOPHIL NFR BLD AUTO: 2.7 %
ERYTHROCYTE [DISTWIDTH] IN BLOOD BY AUTOMATED COUNT: 12.7 % (ref 10–15)
ERYTHROCYTE [SEDIMENTATION RATE] IN BLOOD BY WESTERGREN METHOD: 19 MM/H (ref 0–15)
HCT VFR BLD AUTO: 38.1 % (ref 40–53)
HGB BLD-MCNC: 12.4 G/DL (ref 13.3–17.7)
IMM GRANULOCYTES # BLD: 0.1 10E9/L (ref 0–0.4)
IMM GRANULOCYTES NFR BLD: 0.5 %
LYMPHOCYTES # BLD AUTO: 2.4 10E9/L (ref 0.8–5.3)
LYMPHOCYTES NFR BLD AUTO: 22.7 %
MCH RBC QN AUTO: 28.6 PG (ref 26.5–33)
MCHC RBC AUTO-ENTMCNC: 32.5 G/DL (ref 31.5–36.5)
MCV RBC AUTO: 88 FL (ref 78–100)
MONOCYTES # BLD AUTO: 0.5 10E9/L (ref 0–1.3)
MONOCYTES NFR BLD AUTO: 5 %
NEUTROPHILS # BLD AUTO: 7.3 10E9/L (ref 1.6–8.3)
NEUTROPHILS NFR BLD AUTO: 68.8 %
NRBC # BLD AUTO: 0 10*3/UL
NRBC BLD AUTO-RTO: 0 /100
PLATELET # BLD AUTO: 268 10E9/L (ref 150–450)
RBC # BLD AUTO: 4.34 10E12/L (ref 4.4–5.9)
WBC # BLD AUTO: 10.6 10E9/L (ref 4–11)

## 2019-06-11 PROCEDURE — 86140 C-REACTIVE PROTEIN: CPT | Performed by: ORTHOPAEDIC SURGERY

## 2019-06-11 PROCEDURE — 36415 COLL VENOUS BLD VENIPUNCTURE: CPT | Performed by: ORTHOPAEDIC SURGERY

## 2019-06-11 PROCEDURE — 85652 RBC SED RATE AUTOMATED: CPT | Performed by: ORTHOPAEDIC SURGERY

## 2019-06-11 PROCEDURE — 85025 COMPLETE CBC W/AUTO DIFF WBC: CPT | Performed by: ORTHOPAEDIC SURGERY

## 2019-06-18 ENCOUNTER — OFFICE VISIT (OUTPATIENT)
Dept: FAMILY MEDICINE | Facility: CLINIC | Age: 39
End: 2019-06-18
Payer: COMMERCIAL

## 2019-06-18 VITALS
HEIGHT: 72 IN | TEMPERATURE: 99.1 F | DIASTOLIC BLOOD PRESSURE: 80 MMHG | SYSTOLIC BLOOD PRESSURE: 126 MMHG | BODY MASS INDEX: 42.66 KG/M2 | HEART RATE: 81 BPM | WEIGHT: 315 LBS | OXYGEN SATURATION: 98 %

## 2019-06-18 DIAGNOSIS — Z01.818 PREOP GENERAL PHYSICAL EXAM: Primary | ICD-10-CM

## 2019-06-18 DIAGNOSIS — J45.20 MILD INTERMITTENT ASTHMA WITHOUT COMPLICATION: ICD-10-CM

## 2019-06-18 DIAGNOSIS — Z96.642 STATUS POST TOTAL REPLACEMENT OF LEFT HIP: ICD-10-CM

## 2019-06-18 DIAGNOSIS — S73.005S DISLOCATION OF LEFT HIP, SEQUELA: ICD-10-CM

## 2019-06-18 DIAGNOSIS — I10 HYPERTENSION GOAL BP (BLOOD PRESSURE) < 140/90: ICD-10-CM

## 2019-06-18 DIAGNOSIS — F32.1 MODERATE MAJOR DEPRESSION (H): ICD-10-CM

## 2019-06-18 DIAGNOSIS — E66.01 MORBID OBESITY (H): ICD-10-CM

## 2019-06-18 DIAGNOSIS — G47.33 OSA (OBSTRUCTIVE SLEEP APNEA): ICD-10-CM

## 2019-06-18 DIAGNOSIS — F17.200 TOBACCO USE DISORDER: ICD-10-CM

## 2019-06-18 LAB
CRP SERPL-MCNC: 14 MG/L (ref 0–8)
ERYTHROCYTE [DISTWIDTH] IN BLOOD BY AUTOMATED COUNT: 13 % (ref 10–15)
ERYTHROCYTE [SEDIMENTATION RATE] IN BLOOD BY WESTERGREN METHOD: 18 MM/H (ref 0–15)
HCT VFR BLD AUTO: 39.2 % (ref 40–53)
HGB BLD-MCNC: 13.3 G/DL (ref 13.3–17.7)
MCH RBC QN AUTO: 28.5 PG (ref 26.5–33)
MCHC RBC AUTO-ENTMCNC: 33.9 G/DL (ref 31.5–36.5)
MCV RBC AUTO: 84 FL (ref 78–100)
PLATELET # BLD AUTO: 292 10E9/L (ref 150–450)
RBC # BLD AUTO: 4.67 10E12/L (ref 4.4–5.9)
WBC # BLD AUTO: 9.3 10E9/L (ref 4–11)

## 2019-06-18 PROCEDURE — 99215 OFFICE O/P EST HI 40 MIN: CPT | Performed by: FAMILY MEDICINE

## 2019-06-18 PROCEDURE — 85652 RBC SED RATE AUTOMATED: CPT | Performed by: FAMILY MEDICINE

## 2019-06-18 PROCEDURE — 36415 COLL VENOUS BLD VENIPUNCTURE: CPT | Performed by: FAMILY MEDICINE

## 2019-06-18 PROCEDURE — 85027 COMPLETE CBC AUTOMATED: CPT | Performed by: FAMILY MEDICINE

## 2019-06-18 PROCEDURE — 80048 BASIC METABOLIC PNL TOTAL CA: CPT | Performed by: FAMILY MEDICINE

## 2019-06-18 PROCEDURE — 86140 C-REACTIVE PROTEIN: CPT | Performed by: FAMILY MEDICINE

## 2019-06-18 ASSESSMENT — ANXIETY QUESTIONNAIRES
1. FEELING NERVOUS, ANXIOUS, OR ON EDGE: NOT AT ALL
GAD7 TOTAL SCORE: 2
5. BEING SO RESTLESS THAT IT IS HARD TO SIT STILL: SEVERAL DAYS
2. NOT BEING ABLE TO STOP OR CONTROL WORRYING: NOT AT ALL
6. BECOMING EASILY ANNOYED OR IRRITABLE: NOT AT ALL
IF YOU CHECKED OFF ANY PROBLEMS ON THIS QUESTIONNAIRE, HOW DIFFICULT HAVE THESE PROBLEMS MADE IT FOR YOU TO DO YOUR WORK, TAKE CARE OF THINGS AT HOME, OR GET ALONG WITH OTHER PEOPLE: SOMEWHAT DIFFICULT
7. FEELING AFRAID AS IF SOMETHING AWFUL MIGHT HAPPEN: NOT AT ALL
3. WORRYING TOO MUCH ABOUT DIFFERENT THINGS: NOT AT ALL

## 2019-06-18 ASSESSMENT — PATIENT HEALTH QUESTIONNAIRE - PHQ9
SUM OF ALL RESPONSES TO PHQ QUESTIONS 1-9: 2
5. POOR APPETITE OR OVEREATING: SEVERAL DAYS

## 2019-06-18 ASSESSMENT — MIFFLIN-ST. JEOR: SCORE: 2659

## 2019-06-18 NOTE — PATIENT INSTRUCTIONS
Before Your Surgery      Call your surgeon if there is any change in your health. This includes signs of a cold or flu (such as a sore throat, runny nose, cough, rash or fever).    Do not smoke, drink alcohol or take over the counter medicine (unless your surgeon or primary care doctor tells you to) for the 24 hours before and after surgery.    If you take prescribed drugs: Follow your doctor s orders about which medicines to take and which to stop until after surgery.    Eating and drinking prior to surgery: follow the instructions from your surgeon    Take a shower or bath the night before surgery. Use the soap your surgeon gave you to gently clean your skin. If you do not have soap from your surgeon, use your regular soap. Do not shave or scrub the surgery site.  Wear clean pajamas and have clean sheets on your bed.     Bring CPAP    Hold morning medications day of surgery

## 2019-06-18 NOTE — PROGRESS NOTES
[facility]  [id number] [place] [id number]  [phone]  Dept: [phone]    PRE-OP EVALUATION:  Today's date: 2019    Kimo Greenwood (: 1980) presents for pre-operative evaluation assessment as requested by Dr. Randall.  He requires evaluation and anesthesia risk assessment prior to undergoing surgery/procedure for treatment of left hip replacement .    Fax number for surgical facility: not needed  Primary Physician: Kory Hudson  Type of Anesthesia Anticipated: General    Patient has a Health Care Directive or Living Will:  NO    Preop Questions 2019   Who is doing your surgery? Kimo Greenwood   What are you having done? Hip Surgery   Date of Surgery/Procedure: 2019   Facility or Hospital where procedure/surgery will be performed: [facility]   1.  Do you have a history of Heart attack, stroke, stent, coronary bypass surgery, or other heart surgery? No   2.  Do you ever have any pain or discomfort in your chest? No   3.  Do you have a history of  Heart Failure? No   4.   Are you troubled by shortness of breath when:  walking on a level surface, or up a slight hill, or at night? YES - a little bit   5.  Do you currently have a cold, bronchitis or other respiratory infection? No   6.  Do you have a cough, shortness of breath, or wheezing? No   7.  Do you sometimes get pains in the calves of your legs when you walk? No   8. Do you or anyone in your family have previous history of blood clots? No   9.  Do you or does anyone in your family have a serious bleeding problem such as prolonged bleeding following surgeries or cuts? No   10. Have you ever had problems with anemia or been told to take iron pills? No   11. Have you had any abnormal blood loss such as black, tarry or bloody stools? No   12. Have you ever had a blood transfusion? No   13. Have you or any of your relatives ever had problems with anesthesia? No   14. Do you have sleep  apnea, excessive snoring or daytime drowsiness? YES - sleep apnea    15. Do you have any prosthetic heart valves? No   16. Do you have prosthetic joints? YES - left hip      HPI:     HPI related to upcoming procedure: Dislocation of left hip prosthesis     History of morbid obesity.      History of intermittent asthma. Well controlled. Denies cough, shortness of breath.      History of hypertension. Well controlled with lisinopril and hydrochlorothiazide. Denies chest pain, heart palpitations, peripheral edema, shortness of breath, lightheadedness, or vision changes.      History of obstructive sleep apnea. Wearing the CPAP nightly.     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 None 11/2016. Next colonoscopy 11/2021 if not sooner.       Bilateral low back pain with left-sided sciatica 10/08/2015     Priority: Medium     Chronic low back pain 10/06/2015     Priority: Medium     Suicidal ideation 08/23/2015     Priority: Medium     Leukocytosis 06/09/2014     Priority: Medium     Morbid obesity (H) 05/20/2014     Priority: Medium     Hypertension goal BP (blood pressure) < 140/90 02/03/2014     Priority: Medium     Insomnia 05/14/2013     Priority: Medium     LOREN (obstructive sleep apnea) 04/23/2013     Priority: Medium     CPAP       Moderate major depression (H) 01/15/2013     Priority: Medium     Speech/language delay 11/11/2010      Priority: Medium     Tobacco use disorder 11/11/2010     Priority: Medium     Nocturnal enuresis      Priority: Medium      Past Medical History:   Diagnosis Date     Asthma      Chronic pain - left hip/leg pain      Hypertension      Marijuana abuse      MDD (major depressive disorder)      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    With associated speech/language delay     Obesity 11/11/2010     LOREN (obstructive sleep apnea)      Seborrheic dermatitis      Past Surgical History:   Procedure Laterality Date     ARTHROPLASTY HIP Left 1/25/2017    Procedure: ARTHROPLASTY HIP;  Surgeon: Bala Randall MD;  Location: [facility] OR     BLADDER SURGERY      [facility], [facility],Interstem stimulator implant     CLOSED REDUCTION HIP Left 6/10/2019    Procedure: Closed reduction under general anesthesia left recurrent prosthetic hip dislocation;  Surgeon: Rafat Cazares MD;  Location: [facility] OR     COLONOSCOPY N/A 10/30/2015    Procedure: COMBINED COLONOSCOPY, SINGLE OR MULTIPLE BIOPSY/POLYPECTOMY BY BIOPSY;  Surgeon: Alexis Jackson MD;  Location:  GI     COLONOSCOPY N/A 11/17/2016    Procedure: COMBINED COLONOSCOPY, SINGLE OR MULTIPLE BIOPSY/POLYPECTOMY BY BIOPSY;  Surgeon: Cat Huber MD;  [facility]     ESOPHAGOSCOPY, GASTROSCOPY, DUODENOSCOPY (EGD), COMBINED N/A 11/17/2016    Procedure: COMBINED ESOPHAGOSCOPY, GASTROSCOPY, DUODENOSCOPY (EGD), BIOPSY SINGLE OR MULTIPLE;  Surgeon: Cat Huber MD;  [facility]     EXAM UNDER ANESTHESIA, FULGURATE CONDYLOMA ANUS, COMBINED N/A 12/22/2016    Procedure: COMBINED EXAM UNDER ANESTHESIA, FULGURATE CONDYLOMA ANUS;  Surgeon: Nya Cabello MD;  [facility]     Current Outpatient Medications   Medication Sig Dispense Refill     acetaminophen (TYLENOL) 500 MG tablet 1000 mg every 6-8 hours. Maximum acetaminophen dose is 4000 mg in 24 hours       albuterol (VENTOLIN HFA) 108 (90 Base) MCG/ACT inhaler INHALE 2 PUFFS  INTO LUNGS EVERY SIX HOURS AS NEEDED FOR SHORTNESS OF BREATH / DYSPNEA OR WHEEZING 1 Inhaler 11     ARIPiprazole (ABILIFY) 5 MG tablet Take 1.5 tablets (7.5 mg) by mouth every morning 45 tablet 1     cephALEXin (KEFLEX) 500 MG capsule Take 1 capsule (500 mg) by mouth 2 times daily for 10 days 20 capsule 0     Cholecalciferol (VITAMIN D3) 2000 units CAPS Take 1 capsule by mouth daily 90 capsule 3     FIBER LAXATIVE 0.52 g capsule TAKE 1 CAPSULE BY MOUTH DAILY 28 capsule 3     FLUoxetine (PROZAC) 20 MG capsule Take 60 mg by mouth       fluticasone (FLONASE) 50 MCG/ACT spray Spray 2 sprays into both nostrils every morning       hydrochlorothiazide (HYDRODIURIL) 12.5 MG tablet Take 1 tablet (12.5 mg) by mouth daily 90 tablet 1     hydrocortisone 2.5 % cream Apply topically 2 times daily Apply sparing to face for 1-2 weeks. 30 g 2     ketoconazole (NIZORAL) 2 % shampoo Apply to the scalp and beard two times per week and wash off after 5 minutes. 120 mL 11     lisinopril (PRINIVIL/ZESTRIL) 10 MG tablet Take 1 tablet (10 mg) by mouth every morning 90 tablet 3     mometasone-formoterol (DULERA) 100-5 MCG/ACT inhaler Inhale 2 puffs into the lungs 2 times daily 1 Inhaler 11     ondansetron (ZOFRAN ODT) 4 MG ODT tab Take 1 tablet (4 mg) by mouth every 8 hours as needed for nausea 10 tablet 0     order for DME Equipment being ordered: Lift Chair 1 each 0     oxyCODONE (ROXICODONE) 5 MG tablet Take 1-2 tablets (5-10 mg) by mouth every 4 hours as needed for moderate to severe pain 15 tablet 0     pantoprazole (PROTONIX) 40 MG EC tablet TAKE 1 TABLET BY MOUTH 30-60 MINUTES BEFORE FIRST MEAL OF THE DAY 90 tablet 3     traZODone (DESYREL) 50 MG tablet        OTC products: none    Allergies   Allergen Reactions     No Clinical Screening - See Comments Other (See Comments)     Awoke from anesthesia with anger and ripping off dressing, after interstim placement, outside hospital 2013      Latex Allergy: NO    Social History     Tobacco  "Use     Smoking status: Former Smoker     Packs/day: 0.50     Years: 1.00     Pack years: 0.50     Types: Cigarettes     Smokeless tobacco: Never Used     Tobacco comment: Smokes E Cigs equal to 1 PPD   Substance Use Topics     Alcohol use: Yes     Comment: socially--\"not very often\" \"once a month\"     History   Drug Use No     Comment: patient denies any marijuana use     REVIEW OF SYSTEMS:   CONSTITUTIONAL: NEGATIVE for fever, chills, change in weight  INTEGUMENTARY/SKIN: NEGATIVE for worrisome rashes, moles or lesions  EYES: NEGATIVE for vision changes or irritation  ENT/MOUTH: NEGATIVE for ear, mouth and throat problems  RESP: NEGATIVE for significant cough or SOB  CV: NEGATIVE for chest pain, palpitations or peripheral edema  GI: NEGATIVE for nausea, abdominal pain, heartburn, or change in bowel habits  : NEGATIVE for frequency, dysuria, or hematuria  MUSCULOSKELETAL: NEGATIVE for significant arthralgias or myalgia  NEURO: NEGATIVE for weakness, dizziness or paresthesias  PSYCHIATRIC: NEGATIVE for changes in mood or affect    This document serves as a record of the services and decisions personally performed and made by Kory Hudson MD. It was created on his behalf by Jacklyn Orellana, a trained medical scribe. The creation of this document is based on the provider's statements to the medical scribe.  Jacklyn Orellana 11:17 AM June 18, 2019    EXAM:   /80 (BP Location: Right arm, Patient Position: Chair, Cuff Size: Adult Large)   Pulse 81   Temp 99.1  F (37.3  C) (Oral)   Ht 1.829 m (6')   Wt (!) 169.6 kg (373 lb 14.4 oz)   SpO2 98%   BMI 50.71 kg/m      GENERAL APPEARANCE: morbidly obese, alert and no distress     EYES: EOMI,  PERRL     HENT: ear canals and TM's normal and nose and mouth without ulcers or lesions     NECK: no adenopathy, no asymmetry, masses, or scars and thyroid normal to palpation     RESP: lungs clear to auscultation - no rales, rhonchi or wheezes     CV: trace edema, bilateral " erythema lower shins with excoriations, regular rates and rhythm, normal S1 S2, no S3 or S4 and no murmur, click or rub     ABDOMEN:  soft, nontender, no HSM or masses and bowel sounds normal     MS: extremities normal- no gross deformities noted, no evidence of inflammation in joints, FROM in all extremities.     SKIN: no suspicious lesions or rashes, seborrheic dermatitis     NEURO: Normal strength and tone, sensory exam grossly normal, mentation intact and speech normal     PSYCH: mentation appears normal. and affect normal/bright     LYMPHATICS: No cervical adenopathy    DIAGNOSTICS:   EKG performed on 12/31/2018: Normal Sinus Rhythm, normal axis, normal intervals, no acute ST/T changes c/w ischemia, no LVH by voltage criteria, unchanged from previous tracings    Recent Labs   Lab Test 06/11/19  1222 12/31/18  1128 07/13/18  0735 03/20/17  1117  01/09/17  0849  01/25/16  1541   HGB 12.4* 13.5 13.1* 12.7*   < > 12.9*   < > 14.8    248 242 246   < > 244   < > 268   INR  --   --   --   --   --  0.92  --   --    NA  --  138 140 140  --  140   < > 140   POTASSIUM  --  4.0 3.6 4.0  --  4.0   < > 4.4   CR  --  0.84 0.80 0.97   < > 1.06   < > 0.75   A1C  --   --   --  4.6  --   --   --  4.9    < > = values in this interval not displayed.     IMPRESSION:   Reason for surgery/procedure: Dislocation of left hip prosthesis   Diagnosis/reason for consult: preoperative evaluation for assessment of cardiovascular and respiratory disease as well as overall risk assessment and perioperative medical management.    The proposed surgical procedure is considered INTERMEDIATE risk.    REVISED CARDIAC RISK INDEX  The patient has the following serious cardiovascular risks for perioperative complications such as (MI, PE, VFib and 3  AV Block):  No serious cardiac risks  INTERPRETATION: 0 risks: Class I (very low risk - 0.4% complication rate)    The patient has the following additional risks for perioperative  complications:  Morbid obesity      ICD-10-CM    1. Preop general physical exam Z01.818 Basic metabolic panel     CRP inflammation     Erythrocyte sedimentation rate auto     CBC with platelets   2. Dislocation of left hip, sequela S73.005S    3. Status post total replacement of left hip Z96.642    4. Tobacco use disorder F17.200    5. Morbid obesity (H) E66.01    6. LOREN (obstructive sleep apnea) G47.33    7. Moderate major depression (H) F32.1    8. Mild intermittent asthma without complication J45.20    9. Hypertension goal BP (blood pressure) < 140/90 I10      RECOMMENDATIONS:     Cardiovascular Risk  Performs 4 METs exercise without symptoms (Light housework (dusting, washing dishes) and Climb a flight of stairs) .     Pulmonary Risk  Maximize Asthma treatment      Obstructive Sleep Apnea (or suspected sleep apnea)  Patient is to bring their home CPAP with them on the day of surgery      --Patient is to take all scheduled medications on the day of surgery EXCEPT for modifications listed below.    Anticoagulant or Antiplatelet Medication Use  ASPIRIN: Discontinue ASA 7-10 days prior to procedure to reduce bleeding risk.  NSAIDS: Ibuprofen (Motrin):    Stop five days prior to surgery  Naproxen (Naprosyn):   Stop five days prior to surgery      ACE Inhibitor or Angiotensin Receptor Blocker (ARB) Use  Ace inhibitor or Angiotensin Receptor Blocker (ARB) and should HOLD this medication for the 24 hours prior to surgery.    HOLD the hydrochlorothiazide morning of surgery     APPROVAL GIVEN to proceed with proposed procedure, without further diagnostic evaluation     The information in this document, created by the medical scribe for me, accurately reflects the services I personally performed and the decisions made by me. I have reviewed and approved this document for accuracy prior to leaving the patient care area.  June 18, 2019 11:36 AM    Signed Electronically by: Kory Hudson MD    Copy of this evaluation  report is provided to requesting physician.    [facility] Preop Guidelines    Revised Cardiac Risk Index

## 2019-06-19 ENCOUNTER — APPOINTMENT (OUTPATIENT)
Age: 39
Setting detail: DERMATOLOGY
End: 2019-06-19

## 2019-06-19 VITALS — HEIGHT: 70 IN | WEIGHT: 315 LBS | RESPIRATION RATE: 14 BRPM

## 2019-06-19 DIAGNOSIS — L20.89 OTHER ATOPIC DERMATITIS: ICD-10-CM

## 2019-06-19 DIAGNOSIS — B35.1 TINEA UNGUIUM: ICD-10-CM

## 2019-06-19 DIAGNOSIS — I87.2 VENOUS INSUFFICIENCY (CHRONIC) (PERIPHERAL): ICD-10-CM

## 2019-06-19 PROBLEM — L20.84 INTRINSIC (ALLERGIC) ECZEMA: Status: ACTIVE | Noted: 2019-06-19

## 2019-06-19 LAB
ANION GAP SERPL CALCULATED.3IONS-SCNC: 8 MMOL/L (ref 3–14)
BUN SERPL-MCNC: 16 MG/DL (ref 7–30)
CALCIUM SERPL-MCNC: 8.9 MG/DL (ref 8.5–10.1)
CHLORIDE SERPL-SCNC: 106 MMOL/L (ref 94–109)
CO2 SERPL-SCNC: 25 MMOL/L (ref 20–32)
CREAT SERPL-MCNC: 0.8 MG/DL (ref 0.66–1.25)
GFR SERPL CREATININE-BSD FRML MDRD: >90 ML/MIN/{1.73_M2}
GLUCOSE SERPL-MCNC: 83 MG/DL (ref 70–99)
POTASSIUM SERPL-SCNC: 4 MMOL/L (ref 3.4–5.3)
SODIUM SERPL-SCNC: 139 MMOL/L (ref 133–144)

## 2019-06-19 PROCEDURE — OTHER COUNSELING: OTHER

## 2019-06-19 PROCEDURE — 99214 OFFICE O/P EST MOD 30 MIN: CPT

## 2019-06-19 PROCEDURE — OTHER PRESCRIPTION: OTHER

## 2019-06-19 PROCEDURE — OTHER ADDITIONAL NOTES: OTHER

## 2019-06-19 RX ORDER — BETAMETHASONE DIPROPIONATE 0.5 MG/G
0.05% OINTMENT TOPICAL BID
Qty: 45 | Refills: 0 | Status: ERX | COMMUNITY
Start: 2019-06-19

## 2019-06-19 ASSESSMENT — LOCATION ZONE DERM
LOCATION ZONE: TOE
LOCATION ZONE: LEG

## 2019-06-19 ASSESSMENT — LOCATION SIMPLE DESCRIPTION DERM
LOCATION SIMPLE: RIGHT PRETIBIAL REGION
LOCATION SIMPLE: LEFT GREAT TOE
LOCATION SIMPLE: LEFT PRETIBIAL REGION

## 2019-06-19 ASSESSMENT — LOCATION DETAILED DESCRIPTION DERM
LOCATION DETAILED: LEFT DORSAL GREAT TOE
LOCATION DETAILED: RIGHT PROXIMAL PRETIBIAL REGION
LOCATION DETAILED: LEFT PROXIMAL PRETIBIAL REGION

## 2019-06-19 ASSESSMENT — ANXIETY QUESTIONNAIRES: GAD7 TOTAL SCORE: 2

## 2019-06-19 ASSESSMENT — ASTHMA QUESTIONNAIRES: ACT_TOTALSCORE: 21

## 2019-06-19 NOTE — PROCEDURE: ADDITIONAL NOTES
Additional Notes: He is scheduled for an upcoming hip surgery and surgeon concerned with his eczema on legs. Informed him and his PCA that he should be ok to proceed with surgery.
Detail Level: Zone

## 2019-06-21 ENCOUNTER — HOSPITAL ENCOUNTER (OUTPATIENT)
Dept: LAB | Facility: CLINIC | Age: 39
Discharge: HOME | End: 2019-06-21
Attending: OBSTETRICS & GYNECOLOGY | Admitting: OBSTETRICS & GYNECOLOGY
Payer: COMMERCIAL

## 2019-06-21 DIAGNOSIS — Z01.812 PRE-OPERATIVE LABORATORY EXAMINATION: ICD-10-CM

## 2019-06-21 LAB
ABO + RH BLD: NORMAL
ABO + RH BLD: NORMAL
BLD GP AB SCN SERPL QL: NORMAL
BLOOD BANK CMNT PATIENT-IMP: NORMAL
SPECIMEN EXP DATE BLD: NORMAL

## 2019-06-21 PROCEDURE — 86901 BLOOD TYPING SEROLOGIC RH(D): CPT | Performed by: ORTHOPAEDIC SURGERY

## 2019-06-21 PROCEDURE — 36415 COLL VENOUS BLD VENIPUNCTURE: CPT | Performed by: ORTHOPAEDIC SURGERY

## 2019-06-21 PROCEDURE — 86850 RBC ANTIBODY SCREEN: CPT | Performed by: ORTHOPAEDIC SURGERY

## 2019-06-21 PROCEDURE — 86900 BLOOD TYPING SEROLOGIC ABO: CPT | Performed by: ORTHOPAEDIC SURGERY

## 2019-06-24 ENCOUNTER — ANESTHESIA (OUTPATIENT)
Dept: SURGERY | Facility: CLINIC | Age: 39
DRG: 467 | End: 2019-06-24
Payer: COMMERCIAL

## 2019-06-24 ENCOUNTER — APPOINTMENT (OUTPATIENT)
Dept: PHYSICAL THERAPY | Facility: CLINIC | Age: 39
DRG: 467 | End: 2019-06-24
Attending: ORTHOPAEDIC SURGERY
Payer: COMMERCIAL

## 2019-06-24 ENCOUNTER — APPOINTMENT (OUTPATIENT)
Dept: GENERAL RADIOLOGY | Facility: CLINIC | Age: 39
DRG: 467 | End: 2019-06-24
Attending: ORTHOPAEDIC SURGERY
Payer: COMMERCIAL

## 2019-06-24 ENCOUNTER — HOSPITAL ENCOUNTER (INPATIENT)
Facility: CLINIC | Age: 39
LOS: 1 days | Discharge: HOME | DRG: 467 | End: 2019-06-25
Attending: ORTHOPAEDIC SURGERY | Admitting: ORTHOPAEDIC SURGERY
Payer: COMMERCIAL

## 2019-06-24 ENCOUNTER — ANESTHESIA EVENT (OUTPATIENT)
Dept: SURGERY | Facility: CLINIC | Age: 39
DRG: 467 | End: 2019-06-24
Payer: COMMERCIAL

## 2019-06-24 DIAGNOSIS — Z96.642 STATUS POST TOTAL REPLACEMENT OF LEFT HIP: Primary | ICD-10-CM

## 2019-06-24 LAB
CREAT SERPL-MCNC: 0.9 MG/DL (ref 0.66–1.25)
GFR SERPL CREATININE-BSD FRML MDRD: >90 ML/MIN/{1.73_M2}
GRAM STN SPEC: NORMAL
Lab: NORMAL
PLATELET # BLD AUTO: 245 10E9/L (ref 150–450)
SPECIMEN SOURCE: NORMAL

## 2019-06-24 PROCEDURE — 25800030 ZZH RX IP 258 OP 636: Performed by: ANESTHESIOLOGY

## 2019-06-24 PROCEDURE — 88305 TISSUE EXAM BY PATHOLOGIST: CPT | Performed by: ORTHOPAEDIC SURGERY

## 2019-06-24 PROCEDURE — 71000013 ZZH RECOVERY PHASE 1 LEVEL 1 EA ADDTL HR: Performed by: ORTHOPAEDIC SURGERY

## 2019-06-24 PROCEDURE — 88331 PATH CONSLTJ SURG 1 BLK 1SPC: CPT | Performed by: ORTHOPAEDIC SURGERY

## 2019-06-24 PROCEDURE — 25000125 ZZHC RX 250: Performed by: PHYSICIAN ASSISTANT

## 2019-06-24 PROCEDURE — 71000012 ZZH RECOVERY PHASE 1 LEVEL 1 FIRST HR: Performed by: ORTHOPAEDIC SURGERY

## 2019-06-24 PROCEDURE — 97116 GAIT TRAINING THERAPY: CPT | Mod: GP | Performed by: PHYSICAL THERAPIST

## 2019-06-24 PROCEDURE — 88305 TISSUE EXAM BY PATHOLOGIST: CPT | Mod: 26 | Performed by: ORTHOPAEDIC SURGERY

## 2019-06-24 PROCEDURE — 87075 CULTR BACTERIA EXCEPT BLOOD: CPT | Performed by: ORTHOPAEDIC SURGERY

## 2019-06-24 PROCEDURE — 36000067 ZZH SURGERY LEVEL 5 1ST 30 MIN: Performed by: ORTHOPAEDIC SURGERY

## 2019-06-24 PROCEDURE — 25000128 H RX IP 250 OP 636: Performed by: ORTHOPAEDIC SURGERY

## 2019-06-24 PROCEDURE — 99207 ZZC CONSULT E&M CHANGED TO INITIAL LEVEL: CPT | Performed by: PHYSICIAN ASSISTANT

## 2019-06-24 PROCEDURE — 36000069 ZZH SURGERY LEVEL 5 EA 15 ADDTL MIN: Performed by: ORTHOPAEDIC SURGERY

## 2019-06-24 PROCEDURE — C1776 JOINT DEVICE (IMPLANTABLE): HCPCS | Performed by: ORTHOPAEDIC SURGERY

## 2019-06-24 PROCEDURE — 87070 CULTURE OTHR SPECIMN AEROBIC: CPT | Performed by: ORTHOPAEDIC SURGERY

## 2019-06-24 PROCEDURE — 93010 ELECTROCARDIOGRAM REPORT: CPT | Performed by: INTERNAL MEDICINE

## 2019-06-24 PROCEDURE — 25000128 H RX IP 250 OP 636: Performed by: NURSE ANESTHETIST, CERTIFIED REGISTERED

## 2019-06-24 PROCEDURE — 27211024 ZZHC OR SUPPLY OTHER OPNP: Performed by: ORTHOPAEDIC SURGERY

## 2019-06-24 PROCEDURE — 97161 PT EVAL LOW COMPLEX 20 MIN: CPT | Mod: GP | Performed by: PHYSICAL THERAPIST

## 2019-06-24 PROCEDURE — 25000125 ZZHC RX 250: Performed by: NURSE ANESTHETIST, CERTIFIED REGISTERED

## 2019-06-24 PROCEDURE — 97110 THERAPEUTIC EXERCISES: CPT | Mod: GP | Performed by: PHYSICAL THERAPIST

## 2019-06-24 PROCEDURE — 82565 ASSAY OF CREATININE: CPT | Performed by: ORTHOPAEDIC SURGERY

## 2019-06-24 PROCEDURE — 12000000 ZZH R&B MED SURG/OB

## 2019-06-24 PROCEDURE — 25000128 H RX IP 250 OP 636: Performed by: PHYSICIAN ASSISTANT

## 2019-06-24 PROCEDURE — 88331 PATH CONSLTJ SURG 1 BLK 1SPC: CPT | Mod: 26 | Performed by: ORTHOPAEDIC SURGERY

## 2019-06-24 PROCEDURE — 87205 SMEAR GRAM STAIN: CPT | Performed by: ORTHOPAEDIC SURGERY

## 2019-06-24 PROCEDURE — 97530 THERAPEUTIC ACTIVITIES: CPT | Mod: GP | Performed by: PHYSICAL THERAPIST

## 2019-06-24 PROCEDURE — 85049 AUTOMATED PLATELET COUNT: CPT | Performed by: ORTHOPAEDIC SURGERY

## 2019-06-24 PROCEDURE — 40000986 XR PELVIS AND HIP PORTABLE LEFT 1 VIEW

## 2019-06-24 PROCEDURE — 25800030 ZZH RX IP 258 OP 636: Performed by: ORTHOPAEDIC SURGERY

## 2019-06-24 PROCEDURE — 27210794 ZZH OR GENERAL SUPPLY STERILE: Performed by: ORTHOPAEDIC SURGERY

## 2019-06-24 PROCEDURE — 0SPS0JZ REMOVAL OF SYNTHETIC SUBSTITUTE FROM LEFT HIP JOINT, FEMORAL SURFACE, OPEN APPROACH: ICD-10-PCS | Performed by: ORTHOPAEDIC SURGERY

## 2019-06-24 PROCEDURE — 40000306 ZZH STATISTIC PRE PROC ASSESS II: Performed by: ORTHOPAEDIC SURGERY

## 2019-06-24 PROCEDURE — 36415 COLL VENOUS BLD VENIPUNCTURE: CPT | Performed by: ORTHOPAEDIC SURGERY

## 2019-06-24 PROCEDURE — 99221 1ST HOSP IP/OBS SF/LOW 40: CPT | Performed by: PHYSICIAN ASSISTANT

## 2019-06-24 PROCEDURE — 37000009 ZZH ANESTHESIA TECHNICAL FEE, EACH ADDTL 15 MIN: Performed by: ORTHOPAEDIC SURGERY

## 2019-06-24 PROCEDURE — 0SRS03A REPLACEMENT OF LEFT HIP JOINT, FEMORAL SURFACE WITH CERAMIC SYNTHETIC SUBSTITUTE, UNCEMENTED, OPEN APPROACH: ICD-10-PCS | Performed by: ORTHOPAEDIC SURGERY

## 2019-06-24 PROCEDURE — 25000132 ZZH RX MED GY IP 250 OP 250 PS 637: Performed by: PHYSICIAN ASSISTANT

## 2019-06-24 PROCEDURE — 25000132 ZZH RX MED GY IP 250 OP 250 PS 637: Performed by: ORTHOPAEDIC SURGERY

## 2019-06-24 PROCEDURE — 37000008 ZZH ANESTHESIA TECHNICAL FEE, 1ST 30 MIN: Performed by: ORTHOPAEDIC SURGERY

## 2019-06-24 DEVICE — IMPLANTABLE DEVICE: Type: IMPLANTABLE DEVICE | Site: HIP | Status: FUNCTIONAL

## 2019-06-24 DEVICE — IMP HEAD FEM BIOM BIOLOX DELTA OPTION 28MM 650-1055: Type: IMPLANTABLE DEVICE | Site: HIP | Status: FUNCTIONAL

## 2019-06-24 DEVICE — IMP SLEEVE BIOM TYPE1 TPR FOR BIOLOX DELTA +6MM 650-1068: Type: IMPLANTABLE DEVICE | Site: HIP | Status: FUNCTIONAL

## 2019-06-24 RX ORDER — ONDANSETRON 2 MG/ML
4 INJECTION INTRAMUSCULAR; INTRAVENOUS EVERY 6 HOURS PRN
Status: DISCONTINUED | OUTPATIENT
Start: 2019-06-24 | End: 2019-06-25 | Stop reason: HOSPADM

## 2019-06-24 RX ORDER — ARIPIPRAZOLE 5 MG/1
7.5 TABLET ORAL EVERY MORNING
Status: DISCONTINUED | OUTPATIENT
Start: 2019-06-24 | End: 2019-06-24

## 2019-06-24 RX ORDER — FENTANYL CITRATE 50 UG/ML
INJECTION, SOLUTION INTRAMUSCULAR; INTRAVENOUS PRN
Status: DISCONTINUED | OUTPATIENT
Start: 2019-06-24 | End: 2019-06-24

## 2019-06-24 RX ORDER — NALOXONE HYDROCHLORIDE 0.4 MG/ML
.1-.4 INJECTION, SOLUTION INTRAMUSCULAR; INTRAVENOUS; SUBCUTANEOUS
Status: DISCONTINUED | OUTPATIENT
Start: 2019-06-24 | End: 2019-06-25 | Stop reason: HOSPADM

## 2019-06-24 RX ORDER — ALBUTEROL SULFATE 90 UG/1
2 AEROSOL, METERED RESPIRATORY (INHALATION) EVERY 6 HOURS PRN
Status: DISCONTINUED | OUTPATIENT
Start: 2019-06-24 | End: 2019-06-25 | Stop reason: HOSPADM

## 2019-06-24 RX ORDER — HYDROCHLOROTHIAZIDE 12.5 MG/1
12.5 TABLET ORAL DAILY
Status: DISCONTINUED | OUTPATIENT
Start: 2019-06-24 | End: 2019-06-24

## 2019-06-24 RX ORDER — GLYCOPYRROLATE 0.2 MG/ML
INJECTION, SOLUTION INTRAMUSCULAR; INTRAVENOUS PRN
Status: DISCONTINUED | OUTPATIENT
Start: 2019-06-24 | End: 2019-06-24

## 2019-06-24 RX ORDER — SODIUM CHLORIDE, SODIUM LACTATE, POTASSIUM CHLORIDE, CALCIUM CHLORIDE 600; 310; 30; 20 MG/100ML; MG/100ML; MG/100ML; MG/100ML
INJECTION, SOLUTION INTRAVENOUS CONTINUOUS
Status: DISCONTINUED | OUTPATIENT
Start: 2019-06-24 | End: 2019-06-24 | Stop reason: HOSPADM

## 2019-06-24 RX ORDER — ONDANSETRON 4 MG/1
4 TABLET, ORALLY DISINTEGRATING ORAL EVERY 6 HOURS PRN
Status: DISCONTINUED | OUTPATIENT
Start: 2019-06-24 | End: 2019-06-25 | Stop reason: HOSPADM

## 2019-06-24 RX ORDER — PANTOPRAZOLE SODIUM 40 MG/1
40 TABLET, DELAYED RELEASE ORAL ONCE
Status: COMPLETED | OUTPATIENT
Start: 2019-06-24 | End: 2019-06-24

## 2019-06-24 RX ORDER — ONDANSETRON 4 MG/1
4 TABLET, ORALLY DISINTEGRATING ORAL EVERY 30 MIN PRN
Status: DISCONTINUED | OUTPATIENT
Start: 2019-06-24 | End: 2019-06-24 | Stop reason: HOSPADM

## 2019-06-24 RX ORDER — GLYCINE 1.5 G/100ML
SOLUTION IRRIGATION PRN
Status: DISCONTINUED | OUTPATIENT
Start: 2019-06-24 | End: 2019-06-24 | Stop reason: HOSPADM

## 2019-06-24 RX ORDER — ACETAMINOPHEN 325 MG/1
975 TABLET ORAL EVERY 8 HOURS
Status: DISCONTINUED | OUTPATIENT
Start: 2019-06-24 | End: 2019-06-25 | Stop reason: HOSPADM

## 2019-06-24 RX ORDER — CEFAZOLIN SODIUM 2 G/100ML
2 INJECTION, SOLUTION INTRAVENOUS EVERY 8 HOURS
Status: COMPLETED | OUTPATIENT
Start: 2019-06-24 | End: 2019-06-25

## 2019-06-24 RX ORDER — ONDANSETRON 2 MG/ML
INJECTION INTRAMUSCULAR; INTRAVENOUS PRN
Status: DISCONTINUED | OUTPATIENT
Start: 2019-06-24 | End: 2019-06-24

## 2019-06-24 RX ORDER — SODIUM CHLORIDE, SODIUM LACTATE, POTASSIUM CHLORIDE, CALCIUM CHLORIDE 600; 310; 30; 20 MG/100ML; MG/100ML; MG/100ML; MG/100ML
INJECTION, SOLUTION INTRAVENOUS CONTINUOUS
Status: DISCONTINUED | OUTPATIENT
Start: 2019-06-24 | End: 2019-06-25 | Stop reason: HOSPADM

## 2019-06-24 RX ORDER — HYDROMORPHONE HYDROCHLORIDE 1 MG/ML
.3-.5 INJECTION, SOLUTION INTRAMUSCULAR; INTRAVENOUS; SUBCUTANEOUS
Status: DISCONTINUED | OUTPATIENT
Start: 2019-06-24 | End: 2019-06-25 | Stop reason: HOSPADM

## 2019-06-24 RX ORDER — ARIPIPRAZOLE 5 MG/1
2.5 TABLET ORAL DAILY
Status: DISCONTINUED | OUTPATIENT
Start: 2019-06-24 | End: 2019-06-25 | Stop reason: HOSPADM

## 2019-06-24 RX ORDER — ZOLPIDEM TARTRATE 5 MG/1
5 TABLET ORAL
Status: DISCONTINUED | OUTPATIENT
Start: 2019-06-25 | End: 2019-06-25 | Stop reason: HOSPADM

## 2019-06-24 RX ORDER — LIDOCAINE 40 MG/G
CREAM TOPICAL
Status: DISCONTINUED | OUTPATIENT
Start: 2019-06-24 | End: 2019-06-25

## 2019-06-24 RX ORDER — LISINOPRIL 10 MG/1
10 TABLET ORAL EVERY MORNING
Status: DISCONTINUED | OUTPATIENT
Start: 2019-06-25 | End: 2019-06-25 | Stop reason: HOSPADM

## 2019-06-24 RX ORDER — PROPOFOL 10 MG/ML
INJECTION, EMULSION INTRAVENOUS CONTINUOUS PRN
Status: DISCONTINUED | OUTPATIENT
Start: 2019-06-24 | End: 2019-06-24

## 2019-06-24 RX ORDER — ONDANSETRON 2 MG/ML
4 INJECTION INTRAMUSCULAR; INTRAVENOUS EVERY 30 MIN PRN
Status: DISCONTINUED | OUTPATIENT
Start: 2019-06-24 | End: 2019-06-24 | Stop reason: HOSPADM

## 2019-06-24 RX ORDER — LISINOPRIL 10 MG/1
10 TABLET ORAL EVERY MORNING
Status: DISCONTINUED | OUTPATIENT
Start: 2019-06-24 | End: 2019-06-24

## 2019-06-24 RX ORDER — SODIUM CHLORIDE 9 MG/ML
INJECTION, SOLUTION INTRAVENOUS CONTINUOUS
Status: DISCONTINUED | OUTPATIENT
Start: 2019-06-24 | End: 2019-06-25 | Stop reason: HOSPADM

## 2019-06-24 RX ORDER — OXYCODONE HYDROCHLORIDE 5 MG/1
5-10 TABLET ORAL
Status: DISCONTINUED | OUTPATIENT
Start: 2019-06-24 | End: 2019-06-25 | Stop reason: HOSPADM

## 2019-06-24 RX ORDER — ONDANSETRON 4 MG/1
4 TABLET, ORALLY DISINTEGRATING ORAL EVERY 8 HOURS PRN
Status: DISCONTINUED | OUTPATIENT
Start: 2019-06-24 | End: 2019-06-24

## 2019-06-24 RX ORDER — LIDOCAINE HYDROCHLORIDE 10 MG/ML
INJECTION, SOLUTION INFILTRATION; PERINEURAL PRN
Status: DISCONTINUED | OUTPATIENT
Start: 2019-06-24 | End: 2019-06-24

## 2019-06-24 RX ORDER — METOPROLOL TARTRATE 1 MG/ML
1-2 INJECTION, SOLUTION INTRAVENOUS EVERY 5 MIN PRN
Status: DISCONTINUED | OUTPATIENT
Start: 2019-06-24 | End: 2019-06-24 | Stop reason: HOSPADM

## 2019-06-24 RX ORDER — CELECOXIB 200 MG/1
400 CAPSULE ORAL ONCE
Status: COMPLETED | OUTPATIENT
Start: 2019-06-24 | End: 2019-06-24

## 2019-06-24 RX ORDER — LIDOCAINE 40 MG/G
CREAM TOPICAL
Status: DISCONTINUED | OUTPATIENT
Start: 2019-06-24 | End: 2019-06-25 | Stop reason: HOSPADM

## 2019-06-24 RX ORDER — PRAZOSIN HYDROCHLORIDE 1 MG/1
3 CAPSULE ORAL AT BEDTIME
COMMUNITY

## 2019-06-24 RX ORDER — PANTOPRAZOLE SODIUM 20 MG/1
20 TABLET, DELAYED RELEASE ORAL
Status: DISCONTINUED | OUTPATIENT
Start: 2019-06-25 | End: 2019-06-25 | Stop reason: HOSPADM

## 2019-06-24 RX ORDER — HYDROXYZINE HYDROCHLORIDE 25 MG/1
25 TABLET, FILM COATED ORAL EVERY 6 HOURS PRN
Status: DISCONTINUED | OUTPATIENT
Start: 2019-06-24 | End: 2019-06-25 | Stop reason: HOSPADM

## 2019-06-24 RX ORDER — CEFAZOLIN SODIUM 1 G/3ML
1 INJECTION, POWDER, FOR SOLUTION INTRAMUSCULAR; INTRAVENOUS SEE ADMIN INSTRUCTIONS
Status: DISCONTINUED | OUTPATIENT
Start: 2019-06-24 | End: 2019-06-24 | Stop reason: CLARIF

## 2019-06-24 RX ORDER — CEFAZOLIN SODIUM IN 0.9 % NACL 3 G/100 ML
3 INTRAVENOUS SOLUTION, PIGGYBACK (ML) INTRAVENOUS
Status: COMPLETED | OUTPATIENT
Start: 2019-06-24 | End: 2019-06-24

## 2019-06-24 RX ORDER — FLUTICASONE PROPIONATE 50 MCG
2 SPRAY, SUSPENSION (ML) NASAL DAILY PRN
Status: DISCONTINUED | OUTPATIENT
Start: 2019-06-24 | End: 2019-06-25 | Stop reason: HOSPADM

## 2019-06-24 RX ORDER — ARIPIPRAZOLE 5 MG/1
5 TABLET ORAL DAILY
Status: DISCONTINUED | OUTPATIENT
Start: 2019-06-24 | End: 2019-06-25 | Stop reason: HOSPADM

## 2019-06-24 RX ORDER — TRAZODONE HYDROCHLORIDE 50 MG/1
50 TABLET, FILM COATED ORAL AT BEDTIME
Status: DISCONTINUED | OUTPATIENT
Start: 2019-06-24 | End: 2019-06-24

## 2019-06-24 RX ORDER — PROCHLORPERAZINE MALEATE 5 MG
10 TABLET ORAL EVERY 6 HOURS PRN
Status: DISCONTINUED | OUTPATIENT
Start: 2019-06-24 | End: 2019-06-25 | Stop reason: HOSPADM

## 2019-06-24 RX ORDER — HYDROXYZINE HYDROCHLORIDE 25 MG/1
25 TABLET, FILM COATED ORAL 3 TIMES DAILY PRN
Status: DISCONTINUED | OUTPATIENT
Start: 2019-06-24 | End: 2019-06-25

## 2019-06-24 RX ORDER — BETAMETHASONE DIPROPIONATE 0.05 %
OINTMENT (GRAM) TOPICAL 2 TIMES DAILY
COMMUNITY
End: 2021-09-01

## 2019-06-24 RX ORDER — KETOCONAZOLE 20 MG/ML
SHAMPOO TOPICAL DAILY PRN
Status: DISCONTINUED | OUTPATIENT
Start: 2019-06-24 | End: 2019-06-25 | Stop reason: HOSPADM

## 2019-06-24 RX ORDER — HYDROCHLOROTHIAZIDE 12.5 MG/1
12.5 CAPSULE ORAL DAILY
Status: DISCONTINUED | OUTPATIENT
Start: 2019-06-25 | End: 2019-06-25 | Stop reason: HOSPADM

## 2019-06-24 RX ORDER — NALOXONE HYDROCHLORIDE 0.4 MG/ML
.1-.4 INJECTION, SOLUTION INTRAMUSCULAR; INTRAVENOUS; SUBCUTANEOUS
Status: ACTIVE | OUTPATIENT
Start: 2019-06-24 | End: 2019-06-25

## 2019-06-24 RX ORDER — FENTANYL CITRATE 50 UG/ML
25-50 INJECTION, SOLUTION INTRAMUSCULAR; INTRAVENOUS
Status: DISCONTINUED | OUTPATIENT
Start: 2019-06-24 | End: 2019-06-24 | Stop reason: HOSPADM

## 2019-06-24 RX ORDER — ACETAMINOPHEN 325 MG/1
650 TABLET ORAL EVERY 4 HOURS PRN
Status: DISCONTINUED | OUTPATIENT
Start: 2019-06-27 | End: 2019-06-25 | Stop reason: HOSPADM

## 2019-06-24 RX ORDER — HYDROCORTISONE 2.5 %
CREAM (GRAM) TOPICAL 2 TIMES DAILY
Status: DISCONTINUED | OUTPATIENT
Start: 2019-06-24 | End: 2019-06-25 | Stop reason: HOSPADM

## 2019-06-24 RX ORDER — CELECOXIB 200 MG/1
200 CAPSULE ORAL 2 TIMES DAILY
Status: DISCONTINUED | OUTPATIENT
Start: 2019-06-24 | End: 2019-06-25 | Stop reason: HOSPADM

## 2019-06-24 RX ORDER — ONDANSETRON 2 MG/ML
4 INJECTION INTRAMUSCULAR; INTRAVENOUS EVERY 6 HOURS
Status: DISPENSED | OUTPATIENT
Start: 2019-06-24 | End: 2019-06-25

## 2019-06-24 RX ORDER — PROPOFOL 10 MG/ML
INJECTION, EMULSION INTRAVENOUS PRN
Status: DISCONTINUED | OUTPATIENT
Start: 2019-06-24 | End: 2019-06-24

## 2019-06-24 RX ORDER — HYDROMORPHONE HYDROCHLORIDE 1 MG/ML
.3-.5 INJECTION, SOLUTION INTRAMUSCULAR; INTRAVENOUS; SUBCUTANEOUS EVERY 5 MIN PRN
Status: DISCONTINUED | OUTPATIENT
Start: 2019-06-24 | End: 2019-06-24 | Stop reason: HOSPADM

## 2019-06-24 RX ORDER — OXYBUTYNIN CHLORIDE 15 MG/1
30 TABLET, EXTENDED RELEASE ORAL DAILY
COMMUNITY
End: 2023-09-01

## 2019-06-24 RX ADMIN — RANITIDINE 150 MG: 150 TABLET ORAL at 12:14

## 2019-06-24 RX ADMIN — GLYCOPYRROLATE 0.2 MG: 0.2 INJECTION, SOLUTION INTRAMUSCULAR; INTRAVENOUS at 07:26

## 2019-06-24 RX ADMIN — ROCURONIUM BROMIDE 40 MG: 10 INJECTION INTRAVENOUS at 07:35

## 2019-06-24 RX ADMIN — Medication 1 G: at 09:14

## 2019-06-24 RX ADMIN — Medication 1 G: at 07:47

## 2019-06-24 RX ADMIN — ACETAMINOPHEN 975 MG: 325 TABLET, FILM COATED ORAL at 20:00

## 2019-06-24 RX ADMIN — Medication 5 MG: at 18:50

## 2019-06-24 RX ADMIN — HYDROMORPHONE HYDROCHLORIDE 1 MG: 1 INJECTION, SOLUTION INTRAMUSCULAR; INTRAVENOUS; SUBCUTANEOUS at 08:36

## 2019-06-24 RX ADMIN — PANTOPRAZOLE SODIUM 40 MG: 40 TABLET, DELAYED RELEASE ORAL at 06:10

## 2019-06-24 RX ADMIN — MIDAZOLAM 2 MG: 1 INJECTION INTRAMUSCULAR; INTRAVENOUS at 07:21

## 2019-06-24 RX ADMIN — ROCURONIUM BROMIDE 10 MG: 10 INJECTION INTRAVENOUS at 08:53

## 2019-06-24 RX ADMIN — Medication 1 G: at 09:12

## 2019-06-24 RX ADMIN — RANITIDINE 150 MG: 150 TABLET ORAL at 20:00

## 2019-06-24 RX ADMIN — PROPOFOL 50 MCG/KG/MIN: 10 INJECTION, EMULSION INTRAVENOUS at 07:49

## 2019-06-24 RX ADMIN — SODIUM CHLORIDE 1000 ML: 9 INJECTION, SOLUTION INTRAVENOUS at 11:35

## 2019-06-24 RX ADMIN — CELECOXIB 200 MG: 200 CAPSULE ORAL at 20:00

## 2019-06-24 RX ADMIN — Medication 140 MG: at 07:26

## 2019-06-24 RX ADMIN — PROPOFOL 300 MG: 10 INJECTION, EMULSION INTRAVENOUS at 07:26

## 2019-06-24 RX ADMIN — Medication 3 G: at 07:22

## 2019-06-24 RX ADMIN — SODIUM CHLORIDE: 9 INJECTION, SOLUTION INTRAVENOUS at 19:13

## 2019-06-24 RX ADMIN — HYDROMORPHONE HYDROCHLORIDE 1 MG: 1 INJECTION, SOLUTION INTRAMUSCULAR; INTRAVENOUS; SUBCUTANEOUS at 07:58

## 2019-06-24 RX ADMIN — FENTANYL CITRATE 50 MCG: 50 INJECTION, SOLUTION INTRAMUSCULAR; INTRAVENOUS at 08:03

## 2019-06-24 RX ADMIN — SODIUM CHLORIDE, POTASSIUM CHLORIDE, SODIUM LACTATE AND CALCIUM CHLORIDE: 600; 310; 30; 20 INJECTION, SOLUTION INTRAVENOUS at 08:00

## 2019-06-24 RX ADMIN — FLUOXETINE 60 MG: 20 CAPSULE ORAL at 12:14

## 2019-06-24 RX ADMIN — ACETAMINOPHEN 975 MG: 325 TABLET, FILM COATED ORAL at 12:14

## 2019-06-24 RX ADMIN — LIDOCAINE HYDROCHLORIDE 30 MG: 10 INJECTION, SOLUTION INFILTRATION; PERINEURAL at 07:26

## 2019-06-24 RX ADMIN — CEFAZOLIN SODIUM 2 G: 2 INJECTION, SOLUTION INTRAVENOUS at 16:56

## 2019-06-24 RX ADMIN — ONDANSETRON 4 MG: 2 INJECTION INTRAMUSCULAR; INTRAVENOUS at 12:14

## 2019-06-24 RX ADMIN — Medication 2.5 MG: at 18:50

## 2019-06-24 RX ADMIN — ONDANSETRON 4 MG: 2 INJECTION INTRAMUSCULAR; INTRAVENOUS at 09:09

## 2019-06-24 RX ADMIN — FENTANYL CITRATE 50 MCG: 50 INJECTION, SOLUTION INTRAMUSCULAR; INTRAVENOUS at 09:13

## 2019-06-24 RX ADMIN — SODIUM CHLORIDE, POTASSIUM CHLORIDE, SODIUM LACTATE AND CALCIUM CHLORIDE: 600; 310; 30; 20 INJECTION, SOLUTION INTRAVENOUS at 07:22

## 2019-06-24 RX ADMIN — CELECOXIB 400 MG: 200 CAPSULE ORAL at 06:08

## 2019-06-24 RX ADMIN — FENTANYL CITRATE 150 MCG: 50 INJECTION, SOLUTION INTRAMUSCULAR; INTRAVENOUS at 07:26

## 2019-06-24 RX ADMIN — ONDANSETRON 4 MG: 2 INJECTION INTRAMUSCULAR; INTRAVENOUS at 18:09

## 2019-06-24 RX ADMIN — HYDROCORTISONE: 25 CREAM TOPICAL at 20:00

## 2019-06-24 RX ADMIN — ROCURONIUM BROMIDE 10 MG: 10 INJECTION INTRAVENOUS at 07:26

## 2019-06-24 RX ADMIN — ROCURONIUM BROMIDE 10 MG: 10 INJECTION INTRAVENOUS at 08:13

## 2019-06-24 ASSESSMENT — ACTIVITIES OF DAILY LIVING (ADL)
ADLS_ACUITY_SCORE: 13
ADLS_ACUITY_SCORE: 13
ADLS_ACUITY_SCORE: 12

## 2019-06-24 ASSESSMENT — ENCOUNTER SYMPTOMS
SEIZURES: 0
DYSRHYTHMIAS: 0

## 2019-06-24 ASSESSMENT — LIFESTYLE VARIABLES: TOBACCO_USE: 1

## 2019-06-24 ASSESSMENT — MIFFLIN-ST. JEOR: SCORE: 2656.73

## 2019-06-24 NOTE — PLAN OF CARE
PT-Eval complete.  Very min pain reported during session, but reports stiffness.      Discharge Planner PT   Patient plan for discharge: home with family; pt is hoping to go home tomorrow.   Current status: Min-modA with bed mobility; CGA with sit to stand with from high bed; amb 120' with ww and CGA; slow speed, small steps.  Barriers to return to prior living situation: none anticipated.  No stairs within home.  Recommendations for discharge: home with family.  Rationale for recommendations: Based on current status and expected progression.       Entered by: Rebecca Sosa 06/24/2019 4:41 PM

## 2019-06-24 NOTE — OP NOTE
Procedure Date: 06/24/2019      PREOPERATIVE DIAGNOSIS:  Unstable left total hip arthroplasty.      POSTOPERATIVE DIAGNOSIS:  Unstable left total hip arthroplasty.      PROCEDURE:  Revision left total hip arthroplasty with a head and liner exchange to a Biomet dual mobility head and liner.      SURGEON:  Bala Randall MD      ASSISTANT:  Judy Barragan PA-C      INDICATIONS:  Mr. Greenwood is a very pleasant 38-year-old gentleman who underwent a left total hip arthroplasty for avascular necrosis 2-1/2 years ago.  He has done well with this until the last month when he has had 2 episodes of hip instability.  He healed nicely.  There is no evidence of infection and in both instances, his leg was in a flexed, abducted and internally rotated position and the hip dislocated posteriorly.  We had a long discussion of treatment options.  Given his age, activity level and nature of this hip instability he would like to proceed with revision hip arthroplasty.  I think that is reasonable.  We discussed at this point in time proceeding to given that the implants both appeared to be well fixed and well positioned, we felt that conversion to a dual mobility femoral head and then lengthening the femoral neck to improve stability would be the best option.  He understands the risks, benefits and alternatives, particularly the risk of wound healing problems, infection, neurovascular injury and also of recurrent instability.      NARRATIVE EVENTS:  After thorough evaluation and proper identification of the patient to be operated on, Mr. Greenwood was taken to the operating room, where he underwent general anesthetic, placed supine on the operating table.  Left leg was prepped and draped in the usual sterile manner.  After appropriate surgical pause to confirm the patient's extremity to be operated on, the patient received 2 grams of Ancef.  His left hip was approached through his previous lateral incision centered over the greater  trochanter.  Skin and soft tissue were sharply resected down to the tensor fascia.  The fascia was split in line with the fibers in line with the femur.  This took us down onto the greater trochanter.  Here his abductor repair is somewhat detached, but most of it was intact.  We did encounter some hematoma and seroma.  This was evacuated.  Cultures of this were obtained and sent for aerobic, anaerobic and Gram stain.  Tissue was sent which showed no evidence of acute inflammation.  We then opened the anterior abductor through a modified Hardinge approach, taking us down to the joint capsule.  We did a small capsulectomy and then dislocated the hip anteriorly.  We then were able to remove the femoral head from the trunnion, expose the trunnion and proximal femoral implant which was well fixed.  At this point, we then exposed the acetabulum, which was again also well fixed, removed the polyethylene liner and removed the screws and confirmed that the cup was well fixed and reasonably well positioned.  We replaced it with the 54 mm Biomet G7 acetabular component.  We impacted into it a liner for a dual mobility acetabular component.  Once this was done and solidly into position, we thoroughly irrigated the hip with IrriSept and then paid our attention to the femur.  Here we lengthened the hip given that the previous femoral head that we removed was a +3.  We increased the neck length by using a +6 neck length of 28 mm femoral head.  This is the longest neck length possible and then our 44 mm dual mobility implant was impacted onto the femoral head and under the trunnion, and then, which was appropriate for our size of the acetabular liner.  The hip was then thoroughly irrigated and reduced.  We then closed the joint capsule with interrupted 0 Vicryl sutures, closed #5 Ethibond sutures through bone tunnels and we closed the tensor fascia with interrupted and running 0 Vicryl suture and closed the skin and soft tissue  with absorbable sutures and staples in the skin.  The patient was placed in a well-padded postoperative dressing and abduction pillow and taken to recovery room in stable condition.  He tolerated the procedure without difficulty.         DANICA VAZQUEZ MD             D: 2019   T: 2019   MT: BIJAN      Name:     TERRA SUÁREZ   MRN:      [id number]        Account:        [id number]   :      1980           Procedure Date: 2019      Document: [id number]

## 2019-06-24 NOTE — BRIEF OP NOTE
[facility]    Brief Operative Note    Pre-operative diagnosis: left hip instabilty  Post-operative diagnosis same  Procedure: Procedure(s):  Revision left total hip arthroplasty with head and liner exchange  Surgeon: Surgeon(s) and Role:     * Bala Randall MD - Primary     * Judy Barragan PA-C - Assisting  Anesthesia: General   Estimated blood loss: 300 ml  Drains: None  Specimens:   ID Type Source Tests Collected by Time Destination   1 : Left Hip 1 Fluid Hip, Left ANAEROBIC BACTERIAL CULTURE, FLUID CULTURE AEROBIC BACTERIAL, GRAM STAIN Bala Randall MD 6/24/2019  8:43 AM    2 : Left Hip 2 Fluid Hip, Left ANAEROBIC BACTERIAL CULTURE, FLUID CULTURE AEROBIC BACTERIAL, GRAM STAIN Bala Randall MD 6/24/2019  8:44 AM    3 : Left Hip 3 Fluid Hip, Left ANAEROBIC BACTERIAL CULTURE, FLUID CULTURE AEROBIC BACTERIAL, GRAM STAIN Bala Randall MD 6/24/2019  8:44 AM    A : Left Hip Synovium Tissue Hip, Left SURGICAL PATHOLOGY EXAM Bala Randall MD 6/24/2019  8:17 AM      Findings:   None.  Complications: None.  Implants:    Implant Name Type Inv. Item Serial No.  Lot No. LRB No. Used   36 +3 Neck Ceramic head    [employer]ET [id number] Left 1   36mm Acetabular liner    [employer] [id number] Left 1   6.5mmx20 screw    [employer] [id number] Left 1   6.6x20mm screw    [employer] [id number] Left 1   G7 ACETABULAR SYSTEM DUAL MOBILITY ACETABULAR LINER NEUTRAL    [employer] [id number] Left 1   IMP SLEEVE BIOM TYPE1 TPR FOR BIOLOX DELTA +6MM 650-1068 Total Joint Component/Insert IMP SLEEVE BIOM TYPE1 TPR FOR BIOLOX DELTA +6MM 650-1068  [employer] [id number] Left 1   IMP HEAD FEM BIOM BIOLOX DELTA OPTION 28MM 650-1055 Total Joint Component/Insert IMP HEAD FEM BIOM BIOLOX DELTA OPTION 28MM 650-1055  [employer] [id number] Left 1   Dual Mobility Bearing    [employer]ET [id number] Left 1

## 2019-06-24 NOTE — CONSULTS
[facility]  Consult Note - Hospitalist Service     Date of Admission:  6/24/2019  Consult Requested by: Dr. Randall  Reason for Consult: medical comanagement    Assessment & Plan   Kimo Greenwood is a 38 year old male admitted on 6/24/2019. He is admitted for revision of left MARGO.    Revision left MARGO  --management per primary team    LOREN, on CPAP  --CPAP at night and with naps    Hypertension: resume hydrochlorothiazide and lisinopril 6/25 AM (ordered)    Mild persistent asthma: continue dulera, PRN albuterol. No current evidence for exacerbation.    Depression/anxiety: fluoxetine, aripiprazole    GERD: resumed PPI    Morbid obesity, BMI 50    Chelsi Quesada PA-C  [facility]    ______________________________________________________________________    Chief Complaint   Failed left MARGO    History is obtained from the patient    History of Present Illness   Kimo Greenwood is a 38 year old male who is admitted for revision left MARGO by Dr. Randall.  Pain currently controlled.  Germain catheter is bothering him.  No chest pain, dyspnea, nausea.  Still feeling tired from surgery but otherwise no complaints.     Review of Systems   The 10 point Review of Systems is negative other than noted in the HPI or here.     Past Medical History    I have reviewed this patient's medical history and updated it with pertinent information if needed.   Past Medical History:   Diagnosis Date     Asthma      Chronic pain - left hip/leg pain      Hypertension      Marijuana abuse      MDD (major depressive disorder)      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    With associated speech/language delay     Obesity 11/11/2010     LOREN (obstructive sleep apnea)     Uses a CPAP     Seborrheic dermatitis        Past Surgical History   I have reviewed this patient's surgical history and updated it with pertinent information if needed.  Past Surgical History:   Procedure Laterality Date     ARTHROPLASTY  "HIP Left 1/25/2017    Procedure: ARTHROPLASTY HIP;  Surgeon: Bala Randall MD;  Location: [facility] OR     BLADDER SURGERY      Ibarra, [facility],Interstem stimulator implant     CLOSED REDUCTION HIP Left 6/10/2019    Procedure: Closed reduction under general anesthesia left recurrent prosthetic hip dislocation;  Surgeon: Rafat Cazares MD;  Location: [facility] OR     COLONOSCOPY N/A 10/30/2015    Procedure: COMBINED COLONOSCOPY, SINGLE OR MULTIPLE BIOPSY/POLYPECTOMY BY BIOPSY;  Surgeon: Alexis Jackson MD;  Location: [facility] GI     COLONOSCOPY N/A 11/17/2016    Procedure: COMBINED COLONOSCOPY, SINGLE OR MULTIPLE BIOPSY/POLYPECTOMY BY BIOPSY;  Surgeon: Cat Huber MD;  [facility]: [facility]     ESOPHAGOSCOPY, GASTROSCOPY, DUODENOSCOPY (EGD), COMBINED N/A 11/17/2016    Procedure: COMBINED ESOPHAGOSCOPY, GASTROSCOPY, DUODENOSCOPY (EGD), BIOPSY SINGLE OR MULTIPLE;  Surgeon: Cat Huber MD;  [facility]: [facility]     EXAM UNDER ANESTHESIA, FULGURATE CONDYLOMA ANUS, COMBINED N/A 12/22/2016    Procedure: COMBINED EXAM UNDER ANESTHESIA, FULGURATE CONDYLOMA ANUS;  Surgeon: Nya Cabello MD;  Location: [facility] OR       Social History   I have reviewed this patient's social history and updated it with pertinent information if needed.  Social History     Tobacco Use     Smoking status: Former Smoker     Packs/day: 0.50     Years: 1.00     Pack years: 0.50     Types: Cigarettes     Smokeless tobacco: Never Used     Tobacco comment: Smokes E Cigs equal to 1 PPD   Substance Use Topics     Alcohol use: Yes     Comment: socially--\"not very often\" \"once a month\"     Drug use: No     Comment: patient denies any marijuana use       Family History   I have reviewed this patient's family history and updated it with pertinent information if needed.   Family History   Problem Relation Age of Onset     Anxiety Disorder Mother      Depression Mother      Ulcerative Colitis Mother 18     Breast Cancer Maternal " Grandmother      Cerebrovascular Disease Maternal Grandmother      Breast Cancer Maternal Aunt      Cancer Maternal Grandfather         grt uncles colon cancer       Medications   Medications Prior to Admission   Medication Sig Dispense Refill Last Dose     ARIPiprazole (ABILIFY) 5 MG tablet Take 1.5 tablets (7.5 mg) by mouth every morning 45 tablet 1 6/23/2019 at Unknown time     Cholecalciferol (VITAMIN D3) 2000 units CAPS Take 1 capsule by mouth daily 90 capsule 3 6/23/2019 at Unknown time     FIBER LAXATIVE 0.52 g capsule TAKE 1 CAPSULE BY MOUTH DAILY 28 capsule 3 6/23/2019 at Unknown time     FLUoxetine (PROZAC) 20 MG capsule Take 60 mg by mouth daily    6/23/2019 at Unknown time     ketoconazole (NIZORAL) 2 % shampoo Apply to the scalp and beard two times per week and wash off after 5 minutes. 120 mL 11 6/23/2019 at Unknown time     lisinopril (PRINIVIL/ZESTRIL) 10 MG tablet Take 1 tablet (10 mg) by mouth every morning 90 tablet 3 6/23/2019 at Unknown time     pantoprazole (PROTONIX) 40 MG EC tablet TAKE 1 TABLET BY MOUTH 30-60 MINUTES BEFORE FIRST MEAL OF THE DAY 90 tablet 3 6/23/2019 at Unknown time     traZODone (DESYREL) 50 MG tablet Take 50 mg by mouth At Bedtime    6/23/2019 at Unknown time     acetaminophen (TYLENOL) 500 MG tablet 1000 mg every 6-8 hours. Maximum acetaminophen dose is 4000 mg in 24 hours   Unknown at Unknown time     albuterol (VENTOLIN HFA) 108 (90 Base) MCG/ACT inhaler INHALE 2 PUFFS INTO LUNGS EVERY SIX HOURS AS NEEDED FOR SHORTNESS OF BREATH / DYSPNEA OR WHEEZING 1 Inhaler 11 More than a month at Unknown time     fluticasone (FLONASE) 50 MCG/ACT spray Spray 2 sprays into both nostrils every morning   More than a month at Unknown time     hydrochlorothiazide (HYDRODIURIL) 12.5 MG tablet Take 1 tablet (12.5 mg) by mouth daily 90 tablet 1 Unknown at Unknown time     hydrocortisone 2.5 % cream Apply topically 2 times daily Apply sparing to face for 1-2 weeks. 30 g 2 Unknown at Unknown  time     mometasone-formoterol (DULERA) 100-5 MCG/ACT inhaler Inhale 2 puffs into the lungs 2 times daily 1 Inhaler 11 More than a month at Unknown time     ondansetron (ZOFRAN ODT) 4 MG ODT tab Take 1 tablet (4 mg) by mouth every 8 hours as needed for nausea 10 tablet 0 Unknown at Unknown time     order for DME Equipment being ordered: Lift Chair 1 each 0 Taking     oxyCODONE (ROXICODONE) 5 MG tablet Take 1-2 tablets (5-10 mg) by mouth every 4 hours as needed for moderate to severe pain 15 tablet 0 Unknown at Unknown time       Allergies   Allergies   Allergen Reactions     No Clinical Screening - See Comments Other (See Comments)     Awoke from anesthesia with anger and ripping off dressing, after interstim placement, outside hospital 2013       Physical Exam   Vital Signs: Temp: 97.4  F (36.3  C) Temp src: Temporal BP: 140/67 Pulse: 77 Heart Rate: 70 Resp: 15 SpO2: 90 % O2 Device: Nasal cannula Oxygen Delivery: 2 LPM  Weight: 373 lbs 6.4 oz    Constitutional: sleepy, nontoxic appearing man resting in bed.    Eyes: no icterus, pupils equal and reactive  HEENT: mucous membranes dry  Respiratory: slight wheeze on the right, clears with deep breath  Cardiovascular: RRR, no murmurs  GI: obese, normoactive bowel sounds, soft, nontender  Lymph/Hematologic: no bruising apparent  Genitourinary: calvillo catheter with lillian urine  Skin: warm and dry.  Dressing intact over left hip  Musculoskeletal: abductor pillow in place.  Normal muscle bulk and tone  Neurologic: nonfocal  Psychiatric: sleepy but appropriately arouses to voice, answering questions appropriately        Data   Reviewed labs from past 30 days

## 2019-06-24 NOTE — ANESTHESIA POSTPROCEDURE EVALUATION
Patient: Kimo Greenwood    Procedure(s):  Revision left total hip arthroplasty with head and liner exchange    Diagnosis:left hip instabilty  Diagnosis Additional Information: No value filed.    Anesthesia Type:  General, RSI, ETT    Note:  Anesthesia Post Evaluation    Patient location during evaluation: PACU  Patient participation: Able to fully participate in evaluation  Level of consciousness: awake  Pain management: adequate  Airway patency: patent  Cardiovascular status: acceptable  Respiratory status: acceptable  Hydration status: euvolemic  PONV: controlled     Anesthetic complications: None          Last vitals:  Vitals:    06/24/19 1214 06/24/19 1228 06/24/19 1300   BP:   142/61   Pulse:      Resp: 18 16 16   Temp:      SpO2: 94% 94% 95%         Electronically Signed By: Marlo Gonsalves MD  June 24, 2019  1:57 PM

## 2019-06-24 NOTE — ANESTHESIA PREPROCEDURE EVALUATION
Anesthesia Pre-Procedure Evaluation    Patient: Kimo Greenwood   MRN: [id number] : 1980          Preoperative Diagnosis: left hip instabilty    Procedure(s):  Revision left total hip arthroplasty with head and liner exchange (choice)    Past Medical History:   Diagnosis Date     Asthma      Chronic pain - left hip/leg pain      Hypertension      Marijuana abuse      MDD (major depressive disorder)      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) 2010    With associated speech/language delay     Obesity 2010     LOREN (obstructive sleep apnea)     Uses a CPAP     Seborrheic dermatitis      Past Surgical History:   Procedure Laterality Date     ARTHROPLASTY HIP Left 2017    Procedure: ARTHROPLASTY HIP;  Surgeon: Bala Randall MD;  Location: [facility] OR     BLADDER SURGERY      Ibarra, [facility],Interstem stimulator implant     CLOSED REDUCTION HIP Left 6/10/2019    Procedure: Closed reduction under general anesthesia left recurrent prosthetic hip dislocation;  Surgeon: Rafat Cazares MD;  Location: [facility] OR     COLONOSCOPY N/A 10/30/2015    Procedure: COMBINED COLONOSCOPY, SINGLE OR MULTIPLE BIOPSY/POLYPECTOMY BY BIOPSY;  Surgeon: Alexis Jackson MD;  Location:  GI     COLONOSCOPY N/A 2016    Procedure: COMBINED COLONOSCOPY, SINGLE OR MULTIPLE BIOPSY/POLYPECTOMY BY BIOPSY;  Surgeon: Cat Huber MD;  [facility]     ESOPHAGOSCOPY, GASTROSCOPY, DUODENOSCOPY (EGD), COMBINED N/A 2016    Procedure: COMBINED ESOPHAGOSCOPY, GASTROSCOPY, DUODENOSCOPY (EGD), BIOPSY SINGLE OR MULTIPLE;  Surgeon: Cat Huber MD;  [facility]     EXAM UNDER ANESTHESIA, FULGURATE CONDYLOMA ANUS, COMBINED N/A 2016    Procedure: COMBINED EXAM UNDER ANESTHESIA, FULGURATE CONDYLOMA ANUS;  Surgeon: Nya Cabello MD;  [facility]: [facility]     Anesthesia Evaluation     .             ROS/MED HX    ENT/Pulmonary:     (+)sleep apnea, tobacco use, Mild Persistent  asthma , . .    Neurologic:      (-) seizures, TIA, Other neuro hx, Dementia, Neuropathy and migraines   Cardiovascular:     (+) Dyslipidemia, hypertension----. : . . . :. .      (-) CAD, CHF, arrhythmias and pulmonary hypertension   METS/Exercise Tolerance:     Hematologic:        (-) anemia   Musculoskeletal:   (+) arthritis,  -       GI/Hepatic:     (+) GERD       Renal/Genitourinary:      (-) renal disease   Endo:     (+) Obesity, .   (-) Type I DM, Type II DM, thyroid disease, chronic steroid usage and other endocrine disorder   Psychiatric:     (+) psychiatric history depression      Infectious Disease:         Malignancy:      - no malignancy   Other:    - neg other ROS                      Physical Exam      Airway   Mallampati: II  TM distance: >3 FB  Neck ROM: full    Dental     Cardiovascular   Rhythm and rate: regular and normal  (-) no murmur    Pulmonary    breath sounds clear to auscultation(+) decreased breath sounds       Other findings: Lab Test        06/18/19 06/11/19 12/31/18      --          01/09/17                       1139          1222          1128           --           0849          WBC          9.3          10.6         7.7            < >        10.1          HGB          13.3         12.4*        13.5           < >        12.9*         MCV          84           88           84             < >        87            PLT          292          268          248            < >        244           INR           --           --           --           --          0.92           < > = values in this interval not displayed.                  Lab Test        06/18/19 12/31/18 07/13/18                       1139          1128          0735          NA           139          138          140           POTASSIUM    4.0          4.0          3.6           CHLORIDE     106          104          108           CO2          25           29           24            BUN          16            12           13            CR           0.80         0.84         0.80          ANIONGAP     8            5            8             FILEMON          8.9          9.2          8.6           GLC          83           90           88                  Lab Results   Component Value Date    WBC 9.3 06/18/2019    HGB 13.3 06/18/2019    HCT 39.2 (L) 06/18/2019     06/18/2019    CRP 14.0 (H) 06/18/2019    SED 18 (H) 06/18/2019     06/18/2019    POTASSIUM 4.0 06/18/2019    CHLORIDE 106 06/18/2019    CO2 25 06/18/2019    BUN 16 06/18/2019    CR 0.80 06/18/2019    GLC 83 06/18/2019    FILEMON 8.9 06/18/2019    ALBUMIN 3.9 12/31/2018    PROTTOTAL 7.6 12/31/2018    ALT 29 12/31/2018    AST 18 12/31/2018    ALKPHOS 68 12/31/2018    BILITOTAL 0.6 12/31/2018    LIPASE 120 09/25/2015    INR 0.92 01/09/2017    TSH 2.86 07/13/2018    T4 1.38 04/10/2012       Preop Vitals  BP Readings from Last 3 Encounters:   06/24/19 138/77   06/18/19 126/80   06/10/19 142/83    Pulse Readings from Last 3 Encounters:   06/18/19 81   06/10/19 75   05/18/19 81      Resp Readings from Last 3 Encounters:   06/24/19 18   06/10/19 12   05/18/19 16    SpO2 Readings from Last 3 Encounters:   06/24/19 100%   06/18/19 98%   06/10/19 98%      Temp Readings from Last 1 Encounters:   06/24/19 97.7  F (36.5  C) (Temporal)    Ht Readings from Last 1 Encounters:   06/24/19 1.829 m (6')      Wt Readings from Last 1 Encounters:   06/24/19 (!) 169.4 kg (373 lb 6.4 oz)    Estimated body mass index is 50.64 kg/m  as calculated from the following:    Height as of this encounter: 1.829 m (6').    Weight as of this encounter: 169.4 kg (373 lb 6.4 oz).       Anesthesia Plan      History & Physical Review  History and physical reviewed and following examination; no interval change.    ASA Status:  3 .    NPO Status:  > 8 hours    Plan for General, RSI and ETT with Propofol induction. Maintenance will be Balanced.    PONV prophylaxis:  Ondansetron (or other  5HT-3)  Additional equipment: Videolaryngoscope      Postoperative Care  Postoperative pain management:  IV analgesics and Oral pain medications.      Consents  Anesthetic plan, risks, benefits and alternatives discussed with:  Patient..                 Marlo Gonsalves MD                    .

## 2019-06-24 NOTE — PHARMACY-ADMISSION MEDICATION HISTORY
Admission medication history interview status for this patient is complete. See [facility] admission navigator for allergy information, prior to admission medications and immunization status.     Medication history interview source(s):Family  Medication history resources (including written lists, pill bottles, clinic record): [facility] list  [facility]    Changes made to PTA medication list:  Added: Betamethasone oint, Prazosin and Oxybutynin   Deleted: Zofran, Oxycodone, Trazodone  Changed: none    Actions taken by pharmacist (provider contacted, etc):Called Jose, called mother.     Additional medication history information:None    Medication reconciliation/reorder completed by provider prior to medication history? Yes      Prior to Admission medications    Medication Sig Last Dose Taking? Auth Provider   ARIPiprazole (ABILIFY) 5 MG tablet Take 1.5 tablets (7.5 mg) by mouth every morning 6/23/2019 at Unknown time Yes Kory Hudson MD   betamethasone dipropionate (DIPROSONE) 0.05 % external ointment Apply topically 2 times daily Apply twice daily up to 2 weeks for rash.  Yes Unknown, Entered By History   Cholecalciferol (VITAMIN D3) 2000 units CAPS Take 1 capsule by mouth daily 6/23/2019 at Unknown time Yes Kory Hudson MD   FIBER LAXATIVE 0.52 g capsule TAKE 1 CAPSULE BY MOUTH DAILY 6/23/2019 at Unknown time Yes Kory Hudson MD   FLUoxetine (PROZAC) 20 MG capsule Take 60 mg by mouth daily  6/23/2019 at Unknown time Yes Reported, Patient   ketoconazole (NIZORAL) 2 % shampoo Apply to the scalp and beard two times per week and wash off after 5 minutes. 6/23/2019 at Unknown time Yes Kory Hudson MD   lisinopril (PRINIVIL/ZESTRIL) 10 MG tablet Take 1 tablet (10 mg) by mouth every morning 6/23/2019 at Unknown time Yes Kory Hudson MD   oxybutynin ER (DITROPAN XL) 15 MG 24 hr tablet Take 30 mg by mouth daily  Yes Unknown, Entered By History   pantoprazole (PROTONIX) 40  MG EC tablet TAKE 1 TABLET BY MOUTH 30-60 MINUTES BEFORE FIRST MEAL OF THE DAY 6/23/2019 at Unknown time Yes Kory Hudson MD   prazosin (MINIPRESS) 1 MG capsule Take 3 mg by mouth At Bedtime  Yes Unknown, Entered By History   acetaminophen (TYLENOL) 500 MG tablet 1000 mg every 6-8 hours. Maximum acetaminophen dose is 4000 mg in 24 hours Unknown at Unknown time  Reported, Patient   albuterol (VENTOLIN HFA) 108 (90 Base) MCG/ACT inhaler INHALE 2 PUFFS INTO LUNGS EVERY SIX HOURS AS NEEDED FOR SHORTNESS OF BREATH / DYSPNEA OR WHEEZING More than a month at Unknown time  Kory Hudson MD   fluticasone (FLONASE) 50 MCG/ACT spray Spray 2 sprays into both nostrils every morning More than a month at Unknown time  Unknown, Entered By History   hydrochlorothiazide (HYDRODIURIL) 12.5 MG tablet Take 1 tablet (12.5 mg) by mouth daily Unknown at Unknown time  Kory Hudson MD   hydrocortisone 2.5 % cream Apply topically 2 times daily Apply sparing to face for 1-2 weeks. Unknown at Unknown time  Kory Hudson MD   mometasone-formoterol (DULERA) 100-5 MCG/ACT inhaler Inhale 2 puffs into the lungs 2 times daily More than a month at Unknown time  Kory Hudson MD   order for DME Equipment being ordered: Lift Chair   Kory Hudson MD

## 2019-06-24 NOTE — PLAN OF CARE
DAy RN  Arrived from pacu around 1130 Orientated to room, call light, pain scale/meds and alarms  Vss, CMS+ dressing is CDI  Rated pain 5 or less only wanted po tylenol  Tolerated liquids and crackers.  On 2L then switched to cpap  Adequate calvillo output. On drain  Sleeping between cares  Pharmacy is working on home meds that are not on the med list that need to be reordered.

## 2019-06-25 ENCOUNTER — APPOINTMENT (OUTPATIENT)
Dept: PHYSICAL THERAPY | Facility: CLINIC | Age: 39
DRG: 467 | End: 2019-06-25
Attending: ORTHOPAEDIC SURGERY
Payer: COMMERCIAL

## 2019-06-25 VITALS
BODY MASS INDEX: 42.66 KG/M2 | OXYGEN SATURATION: 96 % | SYSTOLIC BLOOD PRESSURE: 139 MMHG | HEART RATE: 77 BPM | DIASTOLIC BLOOD PRESSURE: 71 MMHG | WEIGHT: 315 LBS | RESPIRATION RATE: 18 BRPM | HEIGHT: 72 IN | TEMPERATURE: 96.7 F

## 2019-06-25 LAB
ANION GAP SERPL CALCULATED.3IONS-SCNC: 3 MMOL/L (ref 3–14)
BUN SERPL-MCNC: 14 MG/DL (ref 7–30)
CALCIUM SERPL-MCNC: 8 MG/DL (ref 8.5–10.1)
CHLORIDE SERPL-SCNC: 106 MMOL/L (ref 94–109)
CO2 SERPL-SCNC: 31 MMOL/L (ref 20–32)
COPATH REPORT: NORMAL
CREAT SERPL-MCNC: 0.86 MG/DL (ref 0.66–1.25)
GFR SERPL CREATININE-BSD FRML MDRD: >90 ML/MIN/{1.73_M2}
GLUCOSE SERPL-MCNC: 99 MG/DL (ref 70–99)
HGB BLD-MCNC: 11.3 G/DL (ref 13.3–17.7)
INTERPRETATION ECG - MUSE: NORMAL
POTASSIUM SERPL-SCNC: 4.2 MMOL/L (ref 3.4–5.3)
SODIUM SERPL-SCNC: 140 MMOL/L (ref 133–144)

## 2019-06-25 PROCEDURE — 36415 COLL VENOUS BLD VENIPUNCTURE: CPT | Performed by: ORTHOPAEDIC SURGERY

## 2019-06-25 PROCEDURE — 85018 HEMOGLOBIN: CPT | Performed by: ORTHOPAEDIC SURGERY

## 2019-06-25 PROCEDURE — 25000132 ZZH RX MED GY IP 250 OP 250 PS 637: Performed by: PHYSICIAN ASSISTANT

## 2019-06-25 PROCEDURE — 80048 BASIC METABOLIC PNL TOTAL CA: CPT | Performed by: ORTHOPAEDIC SURGERY

## 2019-06-25 PROCEDURE — 25000132 ZZH RX MED GY IP 250 OP 250 PS 637: Performed by: ORTHOPAEDIC SURGERY

## 2019-06-25 PROCEDURE — 97530 THERAPEUTIC ACTIVITIES: CPT | Mod: GP | Performed by: PHYSICAL THERAPIST

## 2019-06-25 PROCEDURE — 97116 GAIT TRAINING THERAPY: CPT | Mod: GP | Performed by: PHYSICAL THERAPIST

## 2019-06-25 PROCEDURE — 97110 THERAPEUTIC EXERCISES: CPT | Mod: GP | Performed by: PHYSICAL THERAPIST

## 2019-06-25 PROCEDURE — 99232 SBSQ HOSP IP/OBS MODERATE 35: CPT | Performed by: INTERNAL MEDICINE

## 2019-06-25 PROCEDURE — 25000128 H RX IP 250 OP 636: Performed by: ORTHOPAEDIC SURGERY

## 2019-06-25 RX ORDER — OXYCODONE AND ACETAMINOPHEN 5; 325 MG/1; MG/1
1-2 TABLET ORAL EVERY 4 HOURS PRN
Qty: 75 TABLET | Refills: 0 | Status: SHIPPED | OUTPATIENT
Start: 2019-06-25 | End: 2019-07-30

## 2019-06-25 RX ORDER — ASPIRIN 325 MG
325 TABLET, DELAYED RELEASE (ENTERIC COATED) ORAL 2 TIMES DAILY
Qty: 70 TABLET | Refills: 0 | Status: SHIPPED | OUTPATIENT
Start: 2019-06-25 | End: 2019-10-25

## 2019-06-25 RX ADMIN — PANTOPRAZOLE SODIUM 20 MG: 20 TABLET, DELAYED RELEASE ORAL at 06:53

## 2019-06-25 RX ADMIN — FLUOXETINE 60 MG: 20 CAPSULE ORAL at 07:53

## 2019-06-25 RX ADMIN — CEFAZOLIN SODIUM 2 G: 2 INJECTION, SOLUTION INTRAVENOUS at 00:30

## 2019-06-25 RX ADMIN — LISINOPRIL 10 MG: 10 TABLET ORAL at 07:53

## 2019-06-25 RX ADMIN — CELECOXIB 200 MG: 200 CAPSULE ORAL at 07:54

## 2019-06-25 RX ADMIN — HYDROCHLOROTHIAZIDE 12.5 MG: 12.5 CAPSULE ORAL at 07:53

## 2019-06-25 RX ADMIN — RANITIDINE 150 MG: 150 TABLET ORAL at 07:54

## 2019-06-25 RX ADMIN — Medication 2.5 MG: at 07:54

## 2019-06-25 RX ADMIN — HYDROCORTISONE: 25 CREAM TOPICAL at 07:55

## 2019-06-25 RX ADMIN — Medication 1 CAPSULE: at 07:54

## 2019-06-25 RX ADMIN — Medication 5 MG: at 07:54

## 2019-06-25 RX ADMIN — ACETAMINOPHEN 975 MG: 325 TABLET, FILM COATED ORAL at 11:51

## 2019-06-25 RX ADMIN — ACETAMINOPHEN 975 MG: 325 TABLET, FILM COATED ORAL at 04:15

## 2019-06-25 RX ADMIN — ENOXAPARIN SODIUM 40 MG: 40 INJECTION SUBCUTANEOUS at 07:53

## 2019-06-25 ASSESSMENT — ACTIVITIES OF DAILY LIVING (ADL)
ADLS_ACUITY_SCORE: 14
ADLS_ACUITY_SCORE: 13

## 2019-06-25 NOTE — PLAN OF CARE
A&O x4, slow to respond. VSS on RA. LS CTA all fields. BS active x4. Dressing to L hip is CDI, CMS intact. suraj regular diet well. up with A1 and walker. Scheduled tylenol managing pain. Germain removed, DTV at this time. Will continue to monitor.

## 2019-06-25 NOTE — PROGRESS NOTES
Pt stable during shift, denies pain, ambulated in the llamas. Dressing CDI, calvillo patent with adequate amount, good appetite. C-pap and capnography in use. Will keep monitoring.

## 2019-06-25 NOTE — PROGRESS NOTES
Orthopedic Surgery  6/25/2019    S: Patient voices no complaints today.     O: Blood pressure 120/61, pulse 77, temperature 97.1  F (36.2  C), temperature source Oral, resp. rate 16, height 1.829 m (6'), weight (!) 169.4 kg (373 lb 6.4 oz), SpO2 96 %.  Lab Results   Component Value Date    HGB 13.3 06/18/2019     Lab Results   Component Value Date    INR 0.92 01/09/2017        Neurovascularly intact.  Calves are negative bilaterally, both soft and nontender.  The wound is C/D/I.  The wound looks good with minimal erythema of the surrounding skin.    A: Mr. Greenwood is doing well status post Procedure(s):  Revision left total hip arthroplasty with head and liner exchange.    P: Continue physical therapy.   Anticoagulation home on ASA  Pain management  Discharge planning    Bala Randall  [phone]

## 2019-06-25 NOTE — PROGRESS NOTES
[facility]  Hospitalist Progress Note  Patient Name: Kimo Greenwood    MRN: [id number]  Provider: Mart Anaya MD  06/25/19    Initial presenting complaint/issue to hospital (Diagnosis): MARGO revision         Assessment and Plan:      Summary:  Kimo Greenwood is a 38 year old male with history of avascular necrosis of left hip due to a fall while hunting treated with MARGO, obesity, LOREN for which he uses CPAP, HTN, asthma, depression, anxiety, and GERD. He presented to [facility] on 6/24/2019 for elective revision of left MARGO. Surgery was performed without complications. The hospitalist service was consulted to manage comorbid medical conditions in the post op period.     Problem list:     1.  POD 2 after revision of left MARGO. Initial MARGO was done for avascular necrosis due to injury related to a fall while hunting. Doing well. Passed PT. Home today. Pain control, DVT prophylaxis with ASA, and therapy per Ortho.      2.  LOREN, on CPAP.  Continue CPAP at night and with naps     3.  Hypertension: Resume PTA hydrochlorothiazide and lisinopril      4.  Mild persistent asthma, without acute exacerbation: Continue dulera and PRN albuterol.      5. Depression/anxiety: Continue PTA fluoxetine, aripiprazole     6. GERD: Continue PTA Protonix     7.  Morbid obesity with BMI 50    DVT Prophylaxis:  -  ASA after discharge per Ortho  Code Status: Full Code  Discharge Dispo: Home   Estimated Disch Date / # of Days until Discharge: likely today        Interval History:      Doing well. No new problems. Pain controlled.                   Physical Exam:      Last Vital Signs:  /71   Pulse 77   Temp 96.7  F (35.9  C)   Resp 18   Ht 1.829 m (6')   Wt (!) 169.4 kg (373 lb 6.4 oz)   SpO2 96%   BMI 50.64 kg/m      Intake/Output Summary (Last 24 hours) at 6/25/2019 0936  Last data filed at 6/25/2019 0744  Gross per 24 hour   Intake 3759 ml   Output 2050 ml   Net 1709 ml       GENERAL:  Comfortable.  Cooperative.  PSYCH: pleasant, oriented, No acute distress.  EYES: PERRLA, Normal conjunctiva.  HEART:  Regular rate and rhythm. No JVD. Pulses normal. No edema.  LUNGS:  Clear to auscultation, normal Respiratory effort.  ABDOMEN:  Soft, no hepatosplenomegaly, normal bowel sounds.  EXTREMETIES: No clubbing, cyanosis or ischemia  SKIN:  Dry to touch, No rash.           Medications:      All current medications were reviewed.         Data:      All new lab and imaging data was reviewed.   Labs:       Lab Results   Component Value Date     06/25/2019     06/18/2019     12/31/2018    Lab Results   Component Value Date    CHLORIDE 106 06/25/2019    CHLORIDE 106 06/18/2019    CHLORIDE 104 12/31/2018    Lab Results   Component Value Date    BUN 14 06/25/2019    BUN 16 06/18/2019    BUN 12 12/31/2018      Lab Results   Component Value Date    POTASSIUM 4.2 06/25/2019    POTASSIUM 4.0 06/18/2019    POTASSIUM 4.0 12/31/2018    Lab Results   Component Value Date    CO2 31 06/25/2019    CO2 25 06/18/2019    CO2 29 12/31/2018    Lab Results   Component Value Date    CR 0.86 06/25/2019    CR 0.90 06/24/2019    CR 0.80 06/18/2019       n}

## 2019-06-25 NOTE — DISCHARGE INSTRUCTIONS
Call surgeon or primary care md before or after your follow appointment if any of these issues occur:  1.Uncontrolled/persistant temperature, chills, sweats. Temp >101  2. Uncontrolled pain despite pain medication  3. Numbness or tingling in operative leg, weakness, falls  4. Signs and symptoms of infection. Increased/persistant bleeding,redness,swelling, bruising, drainage (yellow/odourous), or bleeding from or around incision or incision pulling apart.  5. Dizziness, lightheaded (could be pain meds)  6. Calf pain, hard/swollen/warm area, chest pain or shortness of breath   7. Constipation despite taking over the counter stool softner and laxative for constipation   8. Trouble voiding or signs or symptoms of urinary tract infection  9. Uncontrolled bleeding (nose bleed, incision)  10.  if unable to wake call 911    Please have all family and caregivers review this information.    Other instructions:  See general discharge instruction sheet for hips.  1. Do exercises as instructed by therapist-slowly increased activity as pain tolerates  2. Use walker   3. Walk daily increasing distance and time each day by a little.  4. Ice hip for swelling and discomfort 3-4 times daily for 20 minutes  5.Notify your dentist of your implant so you can get antibiotics before any dental work or for any other invasive procedure.  6. Take over the counter stool softener (mirilax, senna, colace) while on narcotics to prevent constipation so bowel movements are regular., take laxative (milk of magnesia) or a suppository if no bm in 2-3 days (take as directed)  7. Keep track of when you take all medication, especially pain medication. See bottles for instructions and side effects  8. Incentive spirometer hourly to help prevent pneumonia  9. See medication handouts for medication information and side effects  10. DO NOT DRINK alcohol or DRIVE on narcotic pain medication.    Friends and family please read and review all discharge  information and medication sheet    IF unable to wake call 911    Dressing information:Do not change dressing  Can shower with aquacell dressing, it is waterproof-do not remove the dressing will be changed at the follow up apt. with your suregon  Inspect surrounding skin daily for s/s of infections.   Do not soak in tub or pool.   If dressing  Loosens wash hands and tape edge down then call surgeon for direction-do not touch incision   If dressing is 1/2 or more full of drainage or leaking call your suregon        Follow up Appointment:  Make follow up apt. 10-14 days post surgery,call to make apt. [phone]. Call the same number with any questions or issues prior to or after apt.

## 2019-06-25 NOTE — PLAN OF CARE
DAy RN  Vss, CMS+ up with SBA gait belt and walker passed pt in am declined need for ot due to prior surgery.   Minimal pain taking tylenol scheduled  Dressing is CDI  Voiding well. Ate and drank well.  Discharge instructions follow up care and medications were all reviewed prior to discharge. Filled discharge medications given to patient (asA and percocet).  Discharge to home with mom.

## 2019-06-25 NOTE — PLAN OF CARE
Discharge Planner PT   Patient plan for discharge: home today  Current status: denies pain at rest, minimal pain 1/10 at most with gait. Indep bed mobility w/effort, transfers w/ Mod I using fww. Walking 150' x 2 with fww, smooth reciprocal gait, some lateral sway, vc to correct. Pt has no stairs at home. Tolerates HEP. His mother works from home and will be present if needs arise.   Barriers to return to prior living situation: none  Recommendations for discharge: home today  Rationale for recommendations: Pt doing well, pain minimal at most, Mod I with fww. Expect pt to cont to progress well at home w./ HEP and walking.   Physical Therapy Discharge Summary    Reason for therapy discharge:    Discharged to home.  All goals and outcomes met, no further needs identified.    Progress towards therapy goal(s). See goals on Care Plan in [facility] electronic health record for goal details.  Goals met    Therapy recommendation(s):    Continue home exercise program.           Entered by: Natividad Jade 06/25/2019 11:34 AM   '

## 2019-06-25 NOTE — PLAN OF CARE
Discharge Planner OT   Patient plan for discharge: home with A  Current status: Pt is POD #1 LTHA revision of surgery completed ~2 years ago. Pt has no precautions, he will have A available as needed at home. No skilled IP OT needs identified, will complete orders.  Barriers to return to prior living situation: none anticipated  Recommendations for discharge: home with family, per PT  Rationale for recommendations: Pt has no skilled IP OT needs identified.       Entered by: Sussy Soto 06/25/2019 8:38 AM

## 2019-06-26 ENCOUNTER — TELEPHONE (OUTPATIENT)
Dept: FAMILY MEDICINE | Facility: CLINIC | Age: 39
End: 2019-06-26

## 2019-06-26 NOTE — TELEPHONE ENCOUNTER
FVRER on 6/25/19 for Status Post Total Replacement Of Left Hip  No future appointment made.  Fatou Pantoja

## 2019-06-26 NOTE — DISCHARGE SUMMARY
Discharge Summary    Kimo Greenwood MRN# [id number]   YOB: 1980 Age: 38 year old     Date of Admission: 6/24/2019 5/2019    Reason for Admission: Kimo Greenwood is an 38 year old male who was admitted to the hospital following surgery with Dr. Bala Randall.    Preoperative Diagnosis: left hip instabilty    Postoperative Diagnosis: left hip instabilty    Procedure Completed: left revision total hip arthroplasty, head and liner exchange    Hospital Course:  Mr. Greenwood was admitted and underwent the above procedure. The patient tolerated the procedure well. There were no complications. Following surgery he was admitted to the floor.  During his stay he did not require any blood transfusions.  His last hemoglobin was noted to be   Lab Results   Component Value Date    HGB 11.3 06/25/2019   .  His pain was controlled with oral pain medication.  During his stay he progressed well in physical therapy and all the therapy goals were met.     Discharge Physical Exam:  /71   Pulse 77   Temp 96.7  F (35.9  C)   Resp 18   Ht 1.829 m (6')   Wt (!) 169.4 kg (373 lb 6.4 oz)   SpO2 96%   BMI 50.64 kg/m    Neurovascularly intact, distal pulses present bilaterally.  Calves are negative bilaterally, both soft and nontender.  The wound looks good with minimal erythema of the surrounding skin.    Assessment: Mr. Greenwood is stable and doing well status post left revision total hip arthroplasty on POD #1.    Plan: We will discharge him home on analgesics and deep venous thrombosis prophylaxis.  He will follow-up with Judy Barragan PA-C or Dr. Bala Randall approximately 2 weeks from surgery.  He may call [phone] to schedule an appointment.    Discharge Instructions:  Discharge diet: Regular   Discharge activity: Weight bear as tolerated.  Advance from the walker to a cane as tolerated.  Discontinue the use of cane when comfortable.   Physical therapy: Work on therapy exercises given in the  hospital.  No hip precautions.   Discharge follow-up: Follow up with Judy Moore PA-C in 12-14 days.   Anticoagulation Asprin 325 mg twice daily for 5 weeks.   Wound care: Leave aquacel dressing in place until post-op follow-up.  If it becomes loose or soiled then remove and replace with daily dry dressings.  Keep wound clean and dry.  May get incision area wet in shower but do not soak or scrub.         Meds:   Willard Greenwood   Home Medication Instructions NESTOR:[id number]    Printed on:06/26/19 8000   Medication Information                      acetaminophen (TYLENOL) 500 MG tablet  1000 mg every 6-8 hours. Maximum acetaminophen dose is 4000 mg in 24 hours             albuterol (VENTOLIN HFA) 108 (90 Base) MCG/ACT inhaler  INHALE 2 PUFFS INTO LUNGS EVERY SIX HOURS AS NEEDED FOR SHORTNESS OF BREATH / DYSPNEA OR WHEEZING             ARIPiprazole (ABILIFY) 5 MG tablet  Take 1.5 tablets (7.5 mg) by mouth every morning             aspirin (ASA) 325 MG EC tablet  Take 1 tablet (325 mg) by mouth 2 times daily             betamethasone dipropionate (DIPROSONE) 0.05 % external ointment  Apply topically 2 times daily Apply twice daily up to 2 weeks for rash.             Cholecalciferol (VITAMIN D3) 2000 units CAPS  Take 1 capsule by mouth daily             FIBER LAXATIVE 0.52 g capsule  TAKE 1 CAPSULE BY MOUTH DAILY             FLUoxetine (PROZAC) 20 MG capsule  Take 60 mg by mouth daily              fluticasone (FLONASE) 50 MCG/ACT spray  Spray 2 sprays into both nostrils every morning             hydrochlorothiazide (HYDRODIURIL) 12.5 MG tablet  Take 1 tablet (12.5 mg) by mouth daily             hydrocortisone 2.5 % cream  Apply topically 2 times daily Apply sparing to face for 1-2 weeks.             ketoconazole (NIZORAL) 2 % shampoo  Apply to the scalp and beard two times per week and wash off after 5 minutes.             lisinopril (PRINIVIL/ZESTRIL) 10 MG tablet  Take 1 tablet (10 mg) by mouth every morning              mometasone-formoterol (DULERA) 100-5 MCG/ACT inhaler  Inhale 2 puffs into the lungs 2 times daily             order for DME  Equipment being ordered: Lift Chair             oxybutynin ER (DITROPAN XL) 15 MG 24 hr tablet  Take 30 mg by mouth daily             oxyCODONE-acetaminophen (PERCOCET) 5-325 MG tablet  Take 1-2 tablets by mouth every 4 hours as needed for pain             pantoprazole (PROTONIX) 40 MG EC tablet  TAKE 1 TABLET BY MOUTH 30-60 MINUTES BEFORE FIRST MEAL OF THE DAY             prazosin (MINIPRESS) 1 MG capsule  Take 3 mg by mouth At Bedtime                     Judy Barragan PA-C  O: [phone]

## 2019-06-26 NOTE — TELEPHONE ENCOUNTER
ED / Discharge Outreach Protocol    Patient Contact    Attempt # 1    Pt to f/u with tara Concepcion RN

## 2019-06-29 LAB
BACTERIA SPEC CULT: NO GROWTH
Lab: NORMAL
SPECIMEN SOURCE: NORMAL

## 2019-07-03 ENCOUNTER — MYC MEDICAL ADVICE (OUTPATIENT)
Dept: FAMILY MEDICINE | Facility: CLINIC | Age: 39
End: 2019-07-03

## 2019-07-03 ENCOUNTER — TRANSFERRED RECORDS (OUTPATIENT)
Dept: HEALTH INFORMATION MANAGEMENT | Facility: CLINIC | Age: 39
End: 2019-07-03

## 2019-07-03 DIAGNOSIS — I10 HYPERTENSION GOAL BP (BLOOD PRESSURE) < 140/90: ICD-10-CM

## 2019-07-03 DIAGNOSIS — R60.0 PERIPHERAL EDEMA: ICD-10-CM

## 2019-07-03 RX ORDER — HYDROCHLOROTHIAZIDE 12.5 MG/1
TABLET ORAL
Qty: 90 TABLET | Refills: 3 | Status: SHIPPED | OUTPATIENT
Start: 2019-07-03 | End: 2021-01-20

## 2019-07-03 NOTE — TELEPHONE ENCOUNTER
"Requested Prescriptions   Pending Prescriptions Disp Refills     hydrochlorothiazide (HYDRODIURIL) 12.5 MG tablet [Pharmacy Med Name: HYDROCHLOROTHIAZIDE 12.5MG TAB] 28 tablet      Sig: TAKE 1 TABLET (12.5MG) BY MOUTH DAILY     Last Written Prescription Date:  12/31/2018  Last Fill Quantity: 90 tablet,  # refills: 1   Last office visit: 6/18/2019 with prescribing provider:  06/18/2019   Future Office Visit:          Diuretics (Including Combos) Protocol Passed - 7/3/2019  7:49 AM        Passed - Blood pressure under 140/90 in past 12 months     BP Readings from Last 3 Encounters:   06/25/19 139/71   06/18/19 126/80   06/10/19 142/83                 Passed - Recent (12 mo) or future (30 days) visit within the authorizing provider's specialty     Patient had office visit in the last 12 months or has a visit in the next 30 days with authorizing provider or within the authorizing provider's specialty.  See \"Patient Info\" tab in inbasket, or \"Choose Columns\" in Meds & Orders section of the refill encounter.              Passed - Medication is active on med list        Passed - Patient is age 18 or older        Passed - Normal serum creatinine on file in past 12 months     Recent Labs   Lab Test 06/25/19  0639   CR 0.86              Passed - Normal serum potassium on file in past 12 months     Recent Labs   Lab Test 06/25/19  0639   POTASSIUM 4.2                    Passed - Normal serum sodium on file in past 12 months     Recent Labs   Lab Test 06/25/19  0639                 Santiago AGUILAR  "

## 2019-07-08 ENCOUNTER — TRANSFERRED RECORDS (OUTPATIENT)
Dept: HEALTH INFORMATION MANAGEMENT | Facility: CLINIC | Age: 39
End: 2019-07-08

## 2019-07-08 LAB
BACTERIA SPEC CULT: NORMAL
Lab: NORMAL
SPECIMEN SOURCE: NORMAL

## 2019-07-09 ENCOUNTER — TELEPHONE (OUTPATIENT)
Dept: FAMILY MEDICINE | Facility: CLINIC | Age: 39
End: 2019-07-09

## 2019-07-09 NOTE — TELEPHONE ENCOUNTER
[facility] needs office notes that support the medical necessity of the lift chair. Please addendum ov notes for 6/18/19.   Fatou Pantoja

## 2019-07-11 ENCOUNTER — TRANSFERRED RECORDS (OUTPATIENT)
Dept: HEALTH INFORMATION MANAGEMENT | Facility: CLINIC | Age: 39
End: 2019-07-11

## 2019-07-30 ENCOUNTER — TELEPHONE (OUTPATIENT)
Dept: PALLIATIVE MEDICINE | Facility: CLINIC | Age: 39
End: 2019-07-30

## 2019-07-30 ENCOUNTER — OFFICE VISIT (OUTPATIENT)
Dept: FAMILY MEDICINE | Facility: CLINIC | Age: 39
End: 2019-07-30
Payer: COMMERCIAL

## 2019-07-30 VITALS
SYSTOLIC BLOOD PRESSURE: 130 MMHG | HEIGHT: 71 IN | OXYGEN SATURATION: 98 % | HEART RATE: 72 BPM | TEMPERATURE: 99 F | DIASTOLIC BLOOD PRESSURE: 74 MMHG | BODY MASS INDEX: 44.1 KG/M2 | WEIGHT: 315 LBS | RESPIRATION RATE: 16 BRPM

## 2019-07-30 DIAGNOSIS — F80.9 SPEECH/LANGUAGE DELAY: ICD-10-CM

## 2019-07-30 DIAGNOSIS — F32.1 MODERATE MAJOR DEPRESSION (H): ICD-10-CM

## 2019-07-30 DIAGNOSIS — E66.01 MORBID OBESITY (H): ICD-10-CM

## 2019-07-30 DIAGNOSIS — M54.50 CHRONIC LOW BACK PAIN WITHOUT SCIATICA, UNSPECIFIED BACK PAIN LATERALITY: ICD-10-CM

## 2019-07-30 DIAGNOSIS — J45.20 MILD INTERMITTENT ASTHMA WITHOUT COMPLICATION: ICD-10-CM

## 2019-07-30 DIAGNOSIS — N39.44 NOCTURNAL ENURESIS: ICD-10-CM

## 2019-07-30 DIAGNOSIS — G89.29 CHRONIC LOW BACK PAIN WITHOUT SCIATICA, UNSPECIFIED BACK PAIN LATERALITY: ICD-10-CM

## 2019-07-30 DIAGNOSIS — K22.70 BARRETT'S ESOPHAGUS DETERMINED BY BIOPSY: ICD-10-CM

## 2019-07-30 DIAGNOSIS — Z00.00 ROUTINE GENERAL MEDICAL EXAMINATION AT A HEALTH CARE FACILITY: Primary | ICD-10-CM

## 2019-07-30 DIAGNOSIS — F70 MILD INTELLECTUAL DISABILITY: ICD-10-CM

## 2019-07-30 DIAGNOSIS — I10 HYPERTENSION GOAL BP (BLOOD PRESSURE) < 140/90: ICD-10-CM

## 2019-07-30 DIAGNOSIS — G47.33 OSA (OBSTRUCTIVE SLEEP APNEA): ICD-10-CM

## 2019-07-30 PROCEDURE — 99395 PREV VISIT EST AGE 18-39: CPT | Performed by: FAMILY MEDICINE

## 2019-07-30 RX ORDER — TOPIRAMATE 25 MG/1
25 TABLET, FILM COATED ORAL DAILY
Qty: 30 TABLET | Refills: 0 | Status: SHIPPED | OUTPATIENT
Start: 2019-07-30 | End: 2020-03-03

## 2019-07-30 ASSESSMENT — ENCOUNTER SYMPTOMS
ABDOMINAL PAIN: 0
CONSTIPATION: 0
HEMATOCHEZIA: 0
CHILLS: 0
HEMATURIA: 0
COUGH: 0

## 2019-07-30 ASSESSMENT — MIFFLIN-ST. JEOR: SCORE: 2643.58

## 2019-07-30 NOTE — PROGRESS NOTES
SUBJECTIVE:   CC: Kimo Greenwood is an 38 year old male who presents for preventative health visit.     Healthy Habits:     Getting at least 3 servings of Calcium per day:  Yes    Bi-annual eye exam:  Yes    Dental care twice a year:  Yes    Sleep apnea or symptoms of sleep apnea:  None    Diet:  Regular (no restrictions)    Frequency of exercise:  1 day/week    Duration of exercise:  15-30 minutes    Medication side effects:  None    PHQ-2 Total Score: 0    Additional concerns today:  No    History of intermittent asthma. Well controlled. Denies cough, shortness of breath.      History of hypertension. Controlled with lisinopril and hydrochlorothiazide. Denies chest pain, heart palpitations, peripheral edema, shortness of breath, lightheadedness, or vision changes.      History of obstructive sleep apnea. Wearing the CPAP nightly.     History of overactive bladder with nocturnal enuresis.    History of chronic low back pain. He reports low back pain and left knee pain. Patient would like a referral to the pain clinic.     Patient status post revision left total hip arthroplasty with a head and liner exchange to a biomet duel mobility head and liner on 6/24/2019. Followed by [facility].     History of moderate major depression. Patient is taking Abilify and Fluoxetine. His mood is uncontrolled, stating the medications no longer seem to be effective. Patient is followed by a nurse practitioner at [facility].     Today's PHQ-2 Score:   PHQ-2 ( 1999 Pf[employer]) 7/30/2019   Q1: Little interest or pleasure in doing things 0   Q2: Feeling down, depressed or hopeless 0   PHQ-2 Score 0   Q1: Little interest or pleasure in doing things Not at all   Q2: Feeling down, depressed or hopeless Not at all   PHQ-2 Score 0     Abuse: Current or Past(Physical, Sexual or Emotional)- No  Do you feel safe in your environment? Yes    Social History     Tobacco Use     Smoking status: Former Smoker     Packs/day: 0.50  "    Years: 1.00     Pack years: 0.50     Types: Cigarettes     Smokeless tobacco: Never Used     Tobacco comment: Smokes E Cigs equal to 1 PPD   Substance Use Topics     Alcohol use: Yes     Comment: socially--\"not very often\" \"once a month\"     If you drink alcohol do you typically have >3 drinks per day or >7 drinks per week? No    Alcohol Use 7/30/2019   Prescreen: >3 drinks/day or >7 drinks/week? Yes   Prescreen: >3 drinks/day or >7 drinks/week? -   AUDIT SCORE  2       Last PSA: No results found for: PSA    Reviewed orders with patient. Reviewed health maintenance and updated orders accordingly - Yes  Patient Active Problem List   Diagnosis     Speech/language delay     Tobacco use disorder     Nocturnal enuresis     Moderate major depression (H)     LOREN (obstructive sleep apnea)     Hypertension goal BP (blood pressure) < 140/90     Morbid obesity (H)     Leukocytosis     Suicidal ideation     Chronic low back pain     Bilateral low back pain with left-sided sciatica     History of colonic polyps     Mild intellectual disability     S/P total hip arthroplasty     Vitamin D deficiency     LPRD (laryngopharyngeal reflux disease)     Patel's esophagus determined by biopsy     Mild intermittent asthma     Past Surgical History:   Procedure Laterality Date     ARTHROPLASTY HIP Left 1/25/2017    Procedure: ARTHROPLASTY HIP;  Surgeon: Bala Randall MD;  Location: [facility] OR     ARTHROPLASTY REVISION HIP Left 6/24/2019    Procedure: Revision left total hip arthroplasty with a head and liner exchange to a Biomet dual mobility head and liner.;  Surgeon: Bala Randall MD;  Location: [facility] OR     BLADDER SURGERY      [facility], [facility],Interstem stimulator implant     CLOSED REDUCTION HIP Left 6/10/2019    Procedure: Closed reduction under general anesthesia left recurrent prosthetic hip dislocation;  Surgeon: Rafat Cazares MD;  Location: [facility] OR     COLONOSCOPY N/A 10/30/2015    Procedure: COMBINED " "COLONOSCOPY, SINGLE OR MULTIPLE BIOPSY/POLYPECTOMY BY BIOPSY;  Surgeon: Alexis Jackson MD;  Location:  GI     COLONOSCOPY N/A 11/17/2016    Procedure: COMBINED COLONOSCOPY, SINGLE OR MULTIPLE BIOPSY/POLYPECTOMY BY BIOPSY;  Surgeon: Cat Huber MD;  Location:  GI     ESOPHAGOSCOPY, GASTROSCOPY, DUODENOSCOPY (EGD), COMBINED N/A 11/17/2016    Procedure: COMBINED ESOPHAGOSCOPY, GASTROSCOPY, DUODENOSCOPY (EGD), BIOPSY SINGLE OR MULTIPLE;  Surgeon: Cat Hubre MD;  Location:  GI     EXAM UNDER ANESTHESIA, FULGURATE CONDYLOMA ANUS, COMBINED N/A 12/22/2016    Procedure: COMBINED EXAM UNDER ANESTHESIA, FULGURATE CONDYLOMA ANUS;  Surgeon: Nya Cabello MD;  Location:  OR       Social History     Tobacco Use     Smoking status: Former Smoker     Packs/day: 0.50     Years: 1.00     Pack years: 0.50     Types: Cigarettes     Smokeless tobacco: Never Used     Tobacco comment: Smokes E Cigs equal to 1 PPD   Substance Use Topics     Alcohol use: Yes     Comment: socially--\"not very often\" \"once a month\"     Family History   Problem Relation Age of Onset     Anxiety Disorder Mother      Depression Mother      Ulcerative Colitis Mother 18     Breast Cancer Maternal Grandmother      Cerebrovascular Disease Maternal Grandmother      Breast Cancer Maternal Aunt      Cancer Maternal Grandfather         grt uncles colon cancer           Reviewed and updated as needed this visit by clinical staff  Tobacco  Allergies  Meds  Problems  Med Hx  Surg Hx  Fam Hx  Soc Hx          Reviewed and updated as needed this visit by Provider  Tobacco  Allergies  Meds  Problems  Med Hx  Surg Hx  Fam Hx        Past Medical History:   Diagnosis Date     Asthma      Chronic pain - left hip/leg pain      Hypertension      Marijuana abuse      MDD (major depressive disorder)      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    With associated speech/language delay     Obesity 11/11/2010     " LOREN (obstructive sleep apnea)     Uses a CPAP     Seborrheic dermatitis       Past Surgical History:   Procedure Laterality Date     ARTHROPLASTY HIP Left 1/25/2017    Procedure: ARTHROPLASTY HIP;  Surgeon: Bala Randall MD;  Location: [facility] OR     ARTHROPLASTY REVISION HIP Left 6/24/2019    Procedure: Revision left total hip arthroplasty with a head and liner exchange to a Biomet dual mobility head and liner.;  Surgeon: Bala Randall MD;  Location: [facility] OR     BLADDER SURGERY      [facility], [facility],Interstem stimulator implant     CLOSED REDUCTION HIP Left 6/10/2019    Procedure: Closed reduction under general anesthesia left recurrent prosthetic hip dislocation;  Surgeon: Rafat Cazares MD;  Location:  OR     COLONOSCOPY N/A 10/30/2015    Procedure: COMBINED COLONOSCOPY, SINGLE OR MULTIPLE BIOPSY/POLYPECTOMY BY BIOPSY;  Surgeon: Alexis Jackson MD;  Location:  GI     COLONOSCOPY N/A 11/17/2016    Procedure: COMBINED COLONOSCOPY, SINGLE OR MULTIPLE BIOPSY/POLYPECTOMY BY BIOPSY;  Surgeon: Cat Huber MD;  Location: [facility] GI     ESOPHAGOSCOPY, GASTROSCOPY, DUODENOSCOPY (EGD), COMBINED N/A 11/17/2016    Procedure: COMBINED ESOPHAGOSCOPY, GASTROSCOPY, DUODENOSCOPY (EGD), BIOPSY SINGLE OR MULTIPLE;  Surgeon: Cat Huber MD;  Location: [facility] GI     EXAM UNDER ANESTHESIA, FULGURATE CONDYLOMA ANUS, COMBINED N/A 12/22/2016    Procedure: COMBINED EXAM UNDER ANESTHESIA, FULGURATE CONDYLOMA ANUS;  Surgeon: Nya Cabello MD;  Location: [facility] OR       Review of Systems   Constitutional: Negative for chills.   HENT: Negative for congestion.    Respiratory: Negative for cough.    Cardiovascular: Negative for chest pain.   Gastrointestinal: Negative for abdominal pain, constipation and hematochezia.   Genitourinary: Negative for hematuria.   INTEGUMENTARY/SKIN: NEGATIVE for worrisome rashes, moles or lesions  EYES: NEGATIVE for vision changes or  "irritation  MUSCULOSKELETAL: NEGATIVE for significant arthralgias or myalgia  NEURO: NEGATIVE for weakness, dizziness or paresthesias  PSYCHIATRIC: NEGATIVE for changes in mood or affect    This document serves as a record of the services and decisions personally performed and made by Kory Hudson MD. It was created on his behalf by Jacklyn Orellana, a trained medical scribe. The creation of this document is based on the provider's statements to the medical scribe.  Jacklyn Orellana 1:24 PM July 30, 2019    OBJECTIVE:   /74 (BP Location: Right arm, Patient Position: Chair, Cuff Size: Adult Large)   Pulse 72   Temp 99  F (37.2  C) (Oral)   Resp 16   Ht 1.803 m (5' 11\")   Wt (!) 169.6 kg (374 lb)   SpO2 98%   BMI 52.16 kg/m      Physical Exam  GENERAL: healthy, alert and no distress  EYES: Eyes grossly normal to inspection, PERRL and conjunctivae and sclerae normal  HENT: ear canals and TM's normal, nose and mouth without ulcers or lesions  NECK: no adenopathy, no asymmetry, masses, or scars and thyroid normal to palpation  RESP: lungs clear to auscultation - no rales, rhonchi or wheezes  CV: trace bilateral lower leg edema, regular rate and rhythm, normal S1 S2, no S3 or S4, no murmur, click or rub, peripheral pulses strong  ABDOMEN: soft, nontender, no hepatosplenomegaly, no masses and bowel sounds normal  MS: no gross musculoskeletal defects noted, no edema  SKIN: no suspicious lesions or rashes  NEURO: Normal strength and tone, mentation intact and speech normal  PSYCH: mentation appears normal, affect normal/bright    Diagnostic Test Results:  Labs reviewed in Epic    ASSESSMENT/PLAN:   1. Routine general medical examination at a health care facility    2. Patel's esophagus determined by biopsy  Continue PPI.    3. Chronic low back pain without sciatica, unspecified back pain laterality  - PAIN MANAGEMENT REFERRAL    4. Morbid obesity (H)  Trial of medication below with significant obesity.  Consider " "weight loss management clinic.  Follow-up in 1 month.  - topiramate (TOPAMAX) 25 MG tablet; Take 1 tablet (25 mg) by mouth daily  Dispense: 30 tablet; Refill: 0    5. Speech/language delay    6. LOREN (obstructive sleep apnea)    7. Nocturnal enuresis    8. Moderate major depression (H)  Following at [facility].    9. Mild intermittent asthma without complication    10. Mild intellectual disability    11. Hypertension goal BP (blood pressure) < 140/90    COUNSELING:   Reviewed preventive health counseling, as reflected in patient instructions  Special attention given to:        Regular exercise       Healthy diet/nutrition    Estimated body mass index is 52.16 kg/m  as calculated from the following:    Height as of this encounter: 1.803 m (5' 11\").    Weight as of this encounter: 169.6 kg (374 lb).     Weight management plan: Discussed healthy diet and exercise guidelines and topamax     reports that he has quit smoking. His smoking use included cigarettes. He has a 0.50 pack-year smoking history. He has never used smokeless tobacco.      Counseling Resources:  ATP IV Guidelines  Pooled Cohorts Equation Calculator  FRAX Risk Assessment  ICSI Preventive Guidelines  Dietary Guidelines for Americans, 2010  USDA's MyPlate  ASA Prophylaxis  Lung CA Screening    The information in this document, created by the medical scribe for me, accurately reflects the services I personally performed and the decisions made by me. I have reviewed and approved this document for accuracy prior to leaving the patient care area.  July 30, 2019 1:46 PM    Kory Hudson MD  [facility]  "

## 2019-07-31 ASSESSMENT — ASTHMA QUESTIONNAIRES: ACT_TOTALSCORE: 25

## 2019-08-01 ENCOUNTER — TRANSFERRED RECORDS (OUTPATIENT)
Dept: HEALTH INFORMATION MANAGEMENT | Facility: CLINIC | Age: 39
End: 2019-08-01

## 2019-08-02 NOTE — TELEPHONE ENCOUNTER
[facility] Referral      1. Confirmed address with patient? Yes  2. Confirmed phone number with patient? Yes  3. Confirmed referring provider? Yes  4. Is the PCP the same as the referring provider? Yes  5. Has the patient been to any previous pain clinics? Yes  (If yes, send SATNAM with welcome letter)  6. Which insurance are we to bill for this appointment?  [employer]    7. Informed pt of cancellation (48 hour) policy? Yes    REGARDING OPIOID MEDICATIONS: We will always address appropriateness of opioid pain medications, but we generally will not automatically take on a prescribing role. When we do take on prescribing of opioids for chronic pain, it is in collaboration with the referring physician for an intermediate period of time (months), with an expectation that the primary physician or provider will assume the prescribing role if medications are effective at stable doses with demonstrated compliance. Therefore, please do not assume that your prescribing responsibilities end on the day of pain clinic consultation.  8. Informed pt of prescribing policy? Yes    9.Please be aware that once you are established with a pain provider and location, you will need to continue have all future visits with that provider and location. It is best to determine what location is the most convenient for you and schedule with that one.    ** PATIENT INFORMED OF THIS POLICY Yes      9. Referring Provider: Kory Hudson     10. Criteria for Triage Eval:   -Missed/Failed 1st DUAL appointment? N/A   -Medication Focused? N/A   -Mental Health Concerns? (e.g. Recent psych hospitalization/snap shot)? N/A   -Active substance abuse? N/A   -Patient behaviors (e.g. Offensive language/raised voice)? N/A

## 2019-08-06 NOTE — PROGRESS NOTES
[facility]     Date of visit: 8/8/2019    Reason for consultation:    Kimo Greenwood is a 38 year old male who is seen in consultation today at the request of his PCP,  Kory Hudson for evaluation of his pain issues and recommendations for management, with specific emphasis on  Reason for Referral: Evaluation for comprehensive services- patients will be evaluated if appropriate for comprehensive service including medication changes, procedures, pain psychology, and pain physical therapy.  While involved with comprehensive services, pain providers will work with referring provider/PCP to stabilize appropriate medication management, with long-term plan of transition of prescribing back to referring provider/PCP upon completion of comprehensive services.      Please complete the following questions:    Do you have any specific questions for the pain specialist? No    Are there any red flags that may impact the assessment or management of the patient? Mental Illness / Communication Difficulties (explain): mild intellectual disability, depression      What is your diagnosis for the patient's pain? Chronic low back pain     Please see the [facility] health questionnaire which the patient completed and reviewed with me in detail.    Review of [place] Prescription Monitoring Program (): No concern for abuse or misuse of controlled medications based on this report.     Pain medications are being prescribed by Dr. Randall.     Subjective:    Chief Complaint:    Chief Complaint   Patient presents with     Pain       Pain history:  Kimo Greenwood is a 38 year old male who presents for initial evaluation of chief complaint of low back pain.  He presents with his independent living skills specialist Silvestre Rubi.    He first started having problems with low back pain about 2 years ago. Insidious onset, without acute precipitating event. He notes a hx of scoliosis.  He was referred to neurosurgery, Saida VERDIN CNP. Injections were recommended. He had a lumbar L4-5 interlaminar epidural steroid injection with Dr. Juarez on 11/23/16. He notes significant benefit for over a year. He had bilateral L4-S1 medial branch block with Dr. Gilbert on 1/30/18, this was minimally helpful and didn't progress for second medial branch block. He completed pool therapy last winter with some benefit, swims now occasionally. Hx of left hip replacement about 2 years ago with Dr. Randall [facility], recently had revision on 6/24/19. He has ongoing left knee pain, is working on this with [facility] as well. He had a knee steroid and synvisc injection last week. He reports he recovered well from this. He was recently seen by primary care provider and started on Topamax for weight loss, unsure if has been helpful for pain. He follows with his mental health nurse practitioner at [facility]- Laureano Rousseau CNP, has a visit every 6 weeks. The pain is located in bilateral low back, radiates down the back of his left knee down into his foot. Denies numbness or tingling. Denies weakness.       Pain description:  Location: low back  Quality: aching, sharp, shooting  Severity/Intensity: 4/10 at best, 7/10 at worst, 7/10 on average  Aggravating factors include: walking long distances, standing for long period of time  Relieving factors include: lying down, ibuprofen, topical gel    The patient otherwise denies bowel or bladder incontinence, parasthesias, weakness, saddle anesthesia, unintentional weight loss, or fever/chills/sweats.     Kimo Greenwood has been seen at a pain clinic in the past.  [facility]- Dr. Coleman x2 visits and x2 injections.     Pain Treatments:  (H--helped; HI--Helped initially; [facility]--Somewhat helpful; NH--No help; W--worse; SE--side effects; ?--Unsure if helpful)   1. Medications:       Current pain medications:   Prozac 60mg  "daily- H for mood   Abilify 7.5mg daily- H for mood   Topamax 25mg daily- ?, unsure if helps with pain     Prazosin 3mg qhs  Current calculated MME: 0    1. Previous Pain Relevant Medications:  NOTE: This medication information taken from patient's intake form, not medical records.    Opiates: oxycodone- [facility], SE, \"didn't like how it made me feel\"   NSAIDS: naproxen- [facility], ibuprofen- [facility]    Muscle Relaxants: no   Anti-migraine mediations: no   Anti-depressants: Prozac- H, Abilify- H   Sleep aids: Melatonin- H   Anxiolytics: no   Neuropathics: no    Topicals: Voltaren gel- [facility]   Other medications not covered above: Tylenol- NH    2. Physical Therapy: yes aquatic therapy- [facility]  3. Pain Psychology: no  4. Surgery: left hip replacement with Dr. Randall with [facility] 2017 with revision 6/24/19  5. Injections: bilateral L4-S1 medial branch block with Dr. Gilbert on 1/30/18- [facility]  L4-5 interlaminar epidural steroid injection with Dr. Juarez on 11/23/16  6. Chiropractic: yes for back- [facility], short term only  7. Acupuncture: no  8. TENS Unit: no    Imaging:  CT of lumbar spine was completed on 11/7/16 and shows:  T12-L1: No disc protrusion. No central or lateral stenosis. No  interval change.     L1-L2: Mild disc space narrowing. Minimal annular bulge. Moderate  bilateral facet joint disease. No significant central or lateral  stenosis. No interval change.     L2-L3: Mild annular bulge. Mild disc space narrowing. No focal disc  protrusion. No central or lateral stenosis. Mild bilateral facet joint  disease.     L3-L4: Moderate disc space narrowing. No disc protrusion. No central  or lateral stenosis. Moderate facet joint disease. No interval change.     L4-L5: Mild disc space narrowing. No focal disc protrusion. Moderate  bilateral facet joint disease. Mild bilateral foraminal stenosis and  mild central stenosis but unchanged in the interval. No definite nerve  root compression.     L5-S1: Mild disc space narrowing. " Note disc protrusion. Mild central  and lateral stenosis. Moderate facet joint disease, left greater than  right. No interval change.                                                                      IMPRESSION:  1. Multilevel Schmorl's nodes as described on the previous study and  unchanged in the interval.  2. Multilevel degenerative changes with narrowed disc spaces and  degenerative facet joint disease.  3. No focal disc protrusion.  4. Mild central stenosis at L4-L5 and lumbosacral levels and mild to  moderate foraminal stenosis at these levels as described but unchanged  in the interval.       Past Medical History:  Past Medical History:   Diagnosis Date     Asthma      Chronic pain - left hip/leg pain      Hypertension      Marijuana abuse      MDD (major depressive disorder)      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    With associated speech/language delay     Obesity 11/11/2010     LOREN (obstructive sleep apnea)     Uses a CPAP     Seborrheic dermatitis        Past Surgical History:  Past Surgical History:   Procedure Laterality Date     ARTHROPLASTY HIP Left 1/25/2017    Procedure: ARTHROPLASTY HIP;  Surgeon: Bala Randall MD;  Location:  OR     ARTHROPLASTY REVISION HIP Left 6/24/2019    Procedure: Revision left total hip arthroplasty with a head and liner exchange to a [employer]omet dual mobility head and liner.;  Surgeon: Bala Randall MD;  Location:  OR     BLADDER SURGERY      [facility], [facility],Interstem stimulator implant     CLOSED REDUCTION HIP Left 6/10/2019    Procedure: Closed reduction under general anesthesia left recurrent prosthetic hip dislocation;  Surgeon: Rafat Cazares MD;  Location:  OR     COLONOSCOPY N/A 10/30/2015    Procedure: COMBINED COLONOSCOPY, SINGLE OR MULTIPLE BIOPSY/POLYPECTOMY BY BIOPSY;  Surgeon: Alexis Jackson MD;  Location:  GI     COLONOSCOPY N/A 11/17/2016    Procedure: COMBINED COLONOSCOPY, SINGLE OR MULTIPLE  BIOPSY/POLYPECTOMY BY BIOPSY;  Surgeon: Cat Huber MD;  Location:  GI     ESOPHAGOSCOPY, GASTROSCOPY, DUODENOSCOPY (EGD), COMBINED N/A 11/17/2016    Procedure: COMBINED ESOPHAGOSCOPY, GASTROSCOPY, DUODENOSCOPY (EGD), BIOPSY SINGLE OR MULTIPLE;  Surgeon: Cat Huber MD;  Location:  GI     EXAM UNDER ANESTHESIA, FULGURATE CONDYLOMA ANUS, COMBINED N/A 12/22/2016    Procedure: COMBINED EXAM UNDER ANESTHESIA, FULGURATE CONDYLOMA ANUS;  Surgeon: Nya Cabello MD;  Location:  OR       Medications:  Current Outpatient Medications   Medication Sig Dispense Refill     albuterol (VENTOLIN HFA) 108 (90 Base) MCG/ACT inhaler INHALE 2 PUFFS INTO LUNGS EVERY SIX HOURS AS NEEDED FOR SHORTNESS OF BREATH / DYSPNEA OR WHEEZING 1 Inhaler 11     ARIPiprazole (ABILIFY) 5 MG tablet Take 1.5 tablets (7.5 mg) by mouth every morning 45 tablet 1     betamethasone dipropionate (DIPROSONE) 0.05 % external ointment Apply topically 2 times daily Apply twice daily up to 2 weeks for rash.       Cholecalciferol (VITAMIN D3) 2000 units CAPS Take 1 capsule by mouth daily 90 capsule 3     FIBER LAXATIVE 0.52 g capsule TAKE 1 CAPSULE BY MOUTH DAILY 28 capsule 3     FLUoxetine (PROZAC) 20 MG capsule Take 60 mg by mouth daily        fluticasone (FLONASE) 50 MCG/ACT spray Spray 2 sprays into both nostrils every morning       hydrochlorothiazide (HYDRODIURIL) 12.5 MG tablet TAKE 1 TABLET (12.5MG) BY MOUTH DAILY 90 tablet 3     hydrocortisone 2.5 % cream Apply topically 2 times daily Apply sparing to face for 1-2 weeks. 30 g 2     ketoconazole (NIZORAL) 2 % shampoo Apply to the scalp and beard two times per week and wash off after 5 minutes. 120 mL 11     lisinopril (PRINIVIL/ZESTRIL) 10 MG tablet Take 1 tablet (10 mg) by mouth every morning 90 tablet 3     mometasone-formoterol (DULERA) 100-5 MCG/ACT inhaler Inhale 2 puffs into the lungs 2 times daily 1 Inhaler 11     order for DME Equipment being ordered: Lift  Chair 1 each 0     oxybutynin ER (DITROPAN XL) 15 MG 24 hr tablet Take 30 mg by mouth daily       pantoprazole (PROTONIX) 40 MG EC tablet TAKE 1 TABLET BY MOUTH 30-60 MINUTES BEFORE FIRST MEAL OF THE DAY 90 tablet 3     prazosin (MINIPRESS) 1 MG capsule Take 3 mg by mouth At Bedtime       topiramate (TOPAMAX) 25 MG tablet Take 1 tablet (25 mg) by mouth daily 30 tablet 0       Allergies:     Allergies   Allergen Reactions     No Clinical Screening - See Comments Other (See Comments)     Awoke from anesthesia with anger and ripping off dressing, after interstim placement, outside hospital 2013       Social History:  Home situation: lives in a [facility] with mother  Support system: mom  Occupation/Schooling: not working  Tobacco use: vape   Drug use: hx of marijuana use, stopped about a year ago   Alcohol use: once in a great while  History of chemical dependency treatment: no  Mental health admissions: once several years ago for depression    Family history:  Family History   Problem Relation Age of Onset     Anxiety Disorder Mother      Depression Mother      Ulcerative Colitis Mother 18     Breast Cancer Maternal Grandmother      Cerebrovascular Disease Maternal Grandmother      Breast Cancer Maternal Aunt      Cancer Maternal Grandfather         grt uncles colon cancer       Review of Systems:    POSTIVE IN BOLD  GENERAL: fever/chills, fatigue, general unwell feeling, weight gain/loss.  HEAD/EYES:  headache, dizziness, or vision changes.    EARS/NOSE/THROAT: nosebleeds, hearing loss, sinus infection, earache, tinnitus.  IMMUNE:  allergies, cancer, immune deficiency, or infections.  SKIN:  itching, rash, hives  HEME/Lymphatic: anemia, easy bruising, easy bleeding.  RESPIRATORY: cough, wheezing, or shortness of breath  CARDIOVASCULAR/Circulation: extremity edema, syncope, hypertension, tachycardia, or angina.  GASTROINTESTINAL: abdominal pain, nausea/emesis, diarrhea, constipation,  hematochezia, or  melena.  ENDOCRINE:  diabetes, steroid use,  thyroid disease or osteoporosis.  MUSCULOSKELETAL: joint pain, stiffness, neck pain, back pain, arthritis, or gout.  GENITOURINARY: frequency, urgency, dysuria, difficulty voiding, hematuria or incontinence.  NEUROLOGIC: weakness, numbness, paresthesias, seizure, tremor, stroke or memory loss.  PSYCHIATRIC: depression, anxiety, stress, suicidal thoughts or mood swings.     Objective:    Physical Exam:  Vitals:    08/08/19 1311   BP: 118/78   Pulse: 87   SpO2: 97%     Exam:  Constitutional: Well developed, well nourished, appears stated age. Morbidly obese.  HEENT: Head atraumatic, normocephalic. Eyes without conjunctival injection or jaundice. Oropharynx clear. Neck supple. No obvious neck masses.  Skin: No rash, lesions, or petechiae of exposed skin.   Extremities: Peripheral pulses intact. No clubbing, cyanosis, or edema.  Psychiatric/mental status: Alert, without lethargy or stupor. Speech fluent. Appropriate affect. Mood normal. Able to follow commands without difficulty.     Musculoskeletal exam:  Unble to walk on the heels and toes due to knee pain. Patient has antalgic gait favoring the left side.   Normal bulk and tone. Unremarkable spinal curvature.     Cervical spine:  Range of motion within normal limits.  Tenderness in the cervical paraspinal muscles.No  Rotation/ext to right: painful   Rotation/ext to left: painful     Thoracic spine:    Kyphosis. No   Tenderness in the thoracic paraspinal muscles.Yes    Lumbar spine:    Flex:  80 degrees   Ext: 20 degrees   Tenderness in the lumbar paraspinal muscles.Yes   Rotation/ext to right: painful    Rotation/ext to left: painful     Myofascial tenderness:  thoracic and lumbar paraspinal tenderness.   Focal tenderness: No SI joint, gluteal, piriformis, or GT tenderness    Neurologic exam:  CN:  Cranial nerves 2-12 are grossly intact  Motor:  5/5 UE and LE strength  Strength:       C4 (shoulder shrug)  symmetric 5/5        C5 (shoulder abduction) symmetric 5/5       C6 (elbow flexion) symmetric 5/5       C7 (elbow extension) symmetric 5/5       C8 (finger abduction, thumb flexion) symmetric 5/5    Reflexes:     Biceps:     R:  2/4 L: 2/4   Brachioradialis   R:  2/4 L: 2/4   Patella:  R:  0/4 L: 0/4   Achilles:  R:  2/4 L: 2/4     Sensory:   Light touch: normal bilateral upper and lower extremities    No allodynia, dysesthesia, or hyperalgesia.    Assessment:  Kimo Greenwood is a 38 year old male with a past medical history significant for mild mental retardation, HTN, LOREN, obesity, and asthma who presents with complaints of low back pain.     1. Low back pain- etiology likely multilevel moderate lumbar facet arthropathy and degenerative disc disease.    2. Mental Health - the patient's mental health concerns affect his experience of pain and contribute to his clinically significant distress.    1. Lumbar facet arthropathy    2. Chronic pain syndrome        Plan:  The following recommendations were given to the patient. Diagnosis, treatment options, risks, benefits, and alternatives were discussed, and all questions were answered. The patient expressed understanding of the plan for management.     I am recommending a multidisciplinary treatment plan to help this patient better manage his pain.  This includes:      1.  Pain Physical Therapy:  YES  Would recommend pain physical therapy. He isn't sure about but will consider. We will discuss at next visit. Currently goes on x1 walk for 15 min a week, advise he start walking twice a week.    2.  Pain Psychologist to address relaxation, behavioral change, coping style, and other factors important to improvement.  NO  Not at this time.    3.  Medication Management:     1. Could consider increase of Topamax (started for weight loss) or addition of gabapentin. He would like to monitor benefit from injection and acupuncture. Will discuss at next visit.     4.  Potential procedures: pain  with lumbar facet loading on physical exam and moderate facet arthropathy noted on imaging. He reports previous epidural steroid injection was helpful (though documentation notes he did not have significant benefit) and lumbar medial branch block without. We discussed a lumbar facet joint injection and he is interested.  Ordered. They will call to schedule. Monitor pain levels with this.     5.  Referrals: acupuncture may be helpful, would recommend. He is interested in. Acupuncture ordered. He will call to check coverage with insurance.    6.  Follow up with LAMBERT Lopez CNP in 4 weeks.     Review of Electronic Chart: Today I have also reviewed available medical information in the patient's medical record at [facility] ([facility]), including relevant provider notes, laboratory work, and imaging.       I spent 60 minutes of time face to face with the patient.  Greater than 50% of this time was spent in patient counseling and/or coordination of care regarding principles of multidisciplinary care, medication management, and treatment options as discussed above.      LAMBERT Lopez CNP  [facility]

## 2019-08-08 ENCOUNTER — OFFICE VISIT (OUTPATIENT)
Dept: PALLIATIVE MEDICINE | Facility: CLINIC | Age: 39
End: 2019-08-08
Payer: COMMERCIAL

## 2019-08-08 VITALS — DIASTOLIC BLOOD PRESSURE: 78 MMHG | SYSTOLIC BLOOD PRESSURE: 118 MMHG | HEART RATE: 87 BPM | OXYGEN SATURATION: 97 %

## 2019-08-08 DIAGNOSIS — M47.816 LUMBAR FACET ARTHROPATHY: Primary | ICD-10-CM

## 2019-08-08 DIAGNOSIS — G89.4 CHRONIC PAIN SYNDROME: ICD-10-CM

## 2019-08-08 PROCEDURE — 99244 OFF/OP CNSLTJ NEW/EST MOD 40: CPT | Performed by: NURSE PRACTITIONER

## 2019-08-08 ASSESSMENT — PAIN SCALES - GENERAL: PAINLEVEL: SEVERE PAIN (7)

## 2019-08-08 NOTE — PATIENT INSTRUCTIONS
1.  Pain Physical Therapy:  YES   Consider pain physical therapy, we will discuss at next visit. Go on x2 15 min walks a week.    2.  Pain Psychologist to address relaxation, behavioral change, coping style, and other factors important to improvement.  NO  Not at this time.    3.  Medication Management:     1. Could consider increase of Topamax or addition of gabapentin. Will discuss at next visit.     4.  Potential procedures: I ordered you lumbar facet joint injections today. They will call to schedule. Monitor pain levels with this.     5.  Referrals: acupuncture ordered today. Call to check coverage with insurance.    6.  Follow up with LAMBERT Lopez CNP in 4 weeks.       ----------------------------------------------------------------  Clinic Number:  [phone]     Call with any questions about your care and for scheduling assistance.     Calls are returned Monday through Friday between 8 AM and 4:30 PM. We usually get back to you within 2 business days depending on the issue/request.    If we are prescribing your medications:    For opioid medication refills, call the clinic or send a Pixia message 7 days in advance.  Please include:    Name of requested medication    Name of the pharmacy.    For non-opioid medications, call your pharmacy directly to request a refill. Please allow 3-4 days to be processed.     Per [place] State Law:    All controlled substance prescriptions must be filled within 30 days of being written.      For those controlled substances allowing refills, pickup must occur within 30 days of last fill.      We believe regular attendance is key to your success in our program!      Any time you are unable to keep your appointment we ask that you call us at least 24 hours in advance to cancel.This will allow us to offer the appointment time to another patient.     Multiple missed appointments may lead to dismissal from the clinic.

## 2019-08-13 ENCOUNTER — TRANSFERRED RECORDS (OUTPATIENT)
Dept: HEALTH INFORMATION MANAGEMENT | Facility: CLINIC | Age: 39
End: 2019-08-13

## 2019-08-27 ENCOUNTER — HOSPITAL ENCOUNTER (EMERGENCY)
Facility: CLINIC | Age: 39
Discharge: HOME | End: 2019-08-27
Attending: EMERGENCY MEDICINE | Admitting: EMERGENCY MEDICINE
Payer: COMMERCIAL

## 2019-08-27 ENCOUNTER — APPOINTMENT (OUTPATIENT)
Dept: GENERAL RADIOLOGY | Facility: CLINIC | Age: 39
End: 2019-08-27
Attending: EMERGENCY MEDICINE
Payer: COMMERCIAL

## 2019-08-27 VITALS
RESPIRATION RATE: 14 BRPM | DIASTOLIC BLOOD PRESSURE: 73 MMHG | OXYGEN SATURATION: 100 % | HEART RATE: 69 BPM | TEMPERATURE: 98 F | SYSTOLIC BLOOD PRESSURE: 128 MMHG

## 2019-08-27 DIAGNOSIS — M54.12 CERVICAL RADICULOPATHY: ICD-10-CM

## 2019-08-27 DIAGNOSIS — R07.89 ATYPICAL CHEST PAIN: ICD-10-CM

## 2019-08-27 LAB
ALBUMIN SERPL-MCNC: 3.9 G/DL (ref 3.4–5)
ALP SERPL-CCNC: 77 U/L (ref 40–150)
ALT SERPL W P-5'-P-CCNC: 27 U/L (ref 0–70)
ANION GAP SERPL CALCULATED.3IONS-SCNC: 1 MMOL/L (ref 3–14)
AST SERPL W P-5'-P-CCNC: 14 U/L (ref 0–45)
BASOPHILS # BLD AUTO: 0.1 10E9/L (ref 0–0.2)
BASOPHILS NFR BLD AUTO: 0.4 %
BILIRUB SERPL-MCNC: 0.3 MG/DL (ref 0.2–1.3)
BUN SERPL-MCNC: 13 MG/DL (ref 7–30)
CALCIUM SERPL-MCNC: 9.1 MG/DL (ref 8.5–10.1)
CHLORIDE SERPL-SCNC: 107 MMOL/L (ref 94–109)
CO2 SERPL-SCNC: 31 MMOL/L (ref 20–32)
CREAT SERPL-MCNC: 0.86 MG/DL (ref 0.66–1.25)
DIFFERENTIAL METHOD BLD: ABNORMAL
EOSINOPHIL # BLD AUTO: 0.2 10E9/L (ref 0–0.7)
EOSINOPHIL NFR BLD AUTO: 1.9 %
ERYTHROCYTE [DISTWIDTH] IN BLOOD BY AUTOMATED COUNT: 12.8 % (ref 10–15)
GFR SERPL CREATININE-BSD FRML MDRD: >90 ML/MIN/{1.73_M2}
GLUCOSE SERPL-MCNC: 82 MG/DL (ref 70–99)
HCT VFR BLD AUTO: 41.4 % (ref 40–53)
HGB BLD-MCNC: 13.9 G/DL (ref 13.3–17.7)
IMM GRANULOCYTES # BLD: 0 10E9/L (ref 0–0.4)
IMM GRANULOCYTES NFR BLD: 0.2 %
LYMPHOCYTES # BLD AUTO: 3.1 10E9/L (ref 0.8–5.3)
LYMPHOCYTES NFR BLD AUTO: 25.2 %
MCH RBC QN AUTO: 28.5 PG (ref 26.5–33)
MCHC RBC AUTO-ENTMCNC: 33.6 G/DL (ref 31.5–36.5)
MCV RBC AUTO: 85 FL (ref 78–100)
MONOCYTES # BLD AUTO: 0.8 10E9/L (ref 0–1.3)
MONOCYTES NFR BLD AUTO: 6.4 %
NEUTROPHILS # BLD AUTO: 8.1 10E9/L (ref 1.6–8.3)
NEUTROPHILS NFR BLD AUTO: 65.9 %
NRBC # BLD AUTO: 0 10*3/UL
NRBC BLD AUTO-RTO: 0 /100
PLATELET # BLD AUTO: 275 10E9/L (ref 150–450)
POTASSIUM SERPL-SCNC: 3.8 MMOL/L (ref 3.4–5.3)
PROT SERPL-MCNC: 8 G/DL (ref 6.8–8.8)
RBC # BLD AUTO: 4.87 10E12/L (ref 4.4–5.9)
SODIUM SERPL-SCNC: 139 MMOL/L (ref 133–144)
TROPONIN I SERPL-MCNC: <0.015 UG/L (ref 0–0.04)
WBC # BLD AUTO: 12.3 10E9/L (ref 4–11)

## 2019-08-27 PROCEDURE — 25000128 H RX IP 250 OP 636: Performed by: EMERGENCY MEDICINE

## 2019-08-27 PROCEDURE — 84484 ASSAY OF TROPONIN QUANT: CPT | Performed by: EMERGENCY MEDICINE

## 2019-08-27 PROCEDURE — 96374 THER/PROPH/DIAG INJ IV PUSH: CPT

## 2019-08-27 PROCEDURE — 80053 COMPREHEN METABOLIC PANEL: CPT | Performed by: EMERGENCY MEDICINE

## 2019-08-27 PROCEDURE — 85025 COMPLETE CBC W/AUTO DIFF WBC: CPT | Performed by: EMERGENCY MEDICINE

## 2019-08-27 PROCEDURE — 25000132 ZZH RX MED GY IP 250 OP 250 PS 637: Performed by: EMERGENCY MEDICINE

## 2019-08-27 PROCEDURE — 99285 EMERGENCY DEPT VISIT HI MDM: CPT | Mod: 25

## 2019-08-27 PROCEDURE — 93005 ELECTROCARDIOGRAM TRACING: CPT

## 2019-08-27 PROCEDURE — 71046 X-RAY EXAM CHEST 2 VIEWS: CPT

## 2019-08-27 RX ORDER — CYCLOBENZAPRINE HCL 5 MG
5 TABLET ORAL 3 TIMES DAILY PRN
Qty: 15 TABLET | Refills: 0 | Status: SHIPPED | OUTPATIENT
Start: 2019-08-27 | End: 2019-10-25

## 2019-08-27 RX ORDER — CYCLOBENZAPRINE HCL 10 MG
10 TABLET ORAL ONCE
Status: COMPLETED | OUTPATIENT
Start: 2019-08-27 | End: 2019-08-27

## 2019-08-27 RX ORDER — KETOROLAC TROMETHAMINE 15 MG/ML
15 INJECTION, SOLUTION INTRAMUSCULAR; INTRAVENOUS ONCE
Status: COMPLETED | OUTPATIENT
Start: 2019-08-27 | End: 2019-08-27

## 2019-08-27 RX ORDER — LIDOCAINE 4 G/G
1 PATCH TOPICAL ONCE
Status: DISCONTINUED | OUTPATIENT
Start: 2019-08-27 | End: 2019-08-28 | Stop reason: HOSPADM

## 2019-08-27 RX ADMIN — CYCLOBENZAPRINE HYDROCHLORIDE 10 MG: 10 TABLET, FILM COATED ORAL at 22:27

## 2019-08-27 RX ADMIN — KETOROLAC TROMETHAMINE 15 MG: 15 INJECTION, SOLUTION INTRAMUSCULAR; INTRAVENOUS at 21:08

## 2019-08-27 RX ADMIN — LIDOCAINE 1 PATCH: 560 PATCH PERCUTANEOUS; TOPICAL; TRANSDERMAL at 22:27

## 2019-08-27 ASSESSMENT — ENCOUNTER SYMPTOMS
NECK PAIN: 1
WEAKNESS: 0
ARTHRALGIAS: 1
NUMBNESS: 0

## 2019-08-27 NOTE — ED AVS SNAPSHOT
[facility]  [address]  [facility] [address]  Phone:  [phone]  Fax:  [phone]                                    Kimo Greenwood   MRN: [id number]    Department:  [facility]   Date of Visit:  8/27/2019           After Visit Summary Signature Page    I have received my discharge instructions, and my questions have been answered. I have discussed any challenges I see with this plan with the nurse or doctor.    ..........................................................................................................................................  Patient/Patient Representative Signature      ..........................................................................................................................................  Patient Representative Print Name and Relationship to Patient    ..................................................               ................................................  Date                                   Time    ..........................................................................................................................................  Reviewed by Signature/Title    ...................................................              ..............................................  Date                                               Time          22EPIC Rev 08/18

## 2019-08-28 LAB — INTERPRETATION ECG - MUSE: NORMAL

## 2019-08-28 NOTE — ED PROVIDER NOTES
History     Chief Complaint:  Chest pain    HPI   Kimo Greenwood is a left-handed 38 year old male with a history of chronic pain and hypertension who presents to the emergency department for evaluation of chest pain, left sided neck pain, and left shoulder pain. The patient reports that he has had neck pain for the last few weeks, though worsened over the past few days. This evening while eating dinner he felt his pain worsen and move down to his left shoulder and chest.  This pain occurred while he was lifting his left arm eating dinner. He also states that it is painful to move his left arm. He denies any recent fall, injury, heavy lifting, or medication use. He denies any numbness or weakness to his arm, leg numbness/weakness, or loss of control of bowel or bladder function.  He has no previous history of cervical spine surgeries.    Allergies:  No Known Drug Allergies    Medications:    Albuterol  Abilify  Fiber laxative  Prozac  Flonase  Hydrodiuril  Lisinopril  Dulera  Ditropan  Protonix  Minipress  Topomax    Past Medical History:    Asthma  Chronic pain  Hypertension  [place]iguana abuse  Depression  Mental retardation  Obesity  LOREN  Seborrheic dermatitis  Patel's esophagus'  Colon polyps    Past Surgical History:    Hip arthroplasty   Hip arthoplasty revision  Bladder surgery  Hip reduction  Colonoscopy x2  EGD combined  Fulgurate condyloma anus combined    Family History:    Anxiety  Depression  Ulcerative colitis    Social History:  The patient presents alone.  Smoking Status: Former  Smokeless Tobacco: Never  Alcohol Use: Yes  Drug Use: No  Marital Status:  Single     Review of Systems   Cardiovascular: Positive for chest pain.   Musculoskeletal: Positive for arthralgias and neck pain.   Neurological: Negative for weakness and numbness.   All other systems reviewed and are negative.      Physical Exam   Vitals:  Patient Vitals for the past 24 hrs:   BP Temp Temp src Pulse Heart Rate Resp SpO2    08/27/19 2200 128/73 -- -- 69 68 14 100 %   08/27/19 2038 (!) 149/77 98  F (36.7  C) Oral -- 79 20 96 %     Physical Exam  General:              Well-nourished              Speaking in full sentences              Obese middle age male  Eyes:              Conjunctiva without injection or scleral icterus  ENT:              Moist mucous membranes              Nares patent              Pinnae normal  Neck:              Full ROM              No stiffness appreciated  Resp:              Lungs CTAB              No crackles, wheezing or audible rubs              Good air movement  CV:                    Normal rate, regular rhythm              S1 and S2 present              No murmur, gallop or rub  GI:              BS present              Abdomen soft without distention              Non-tender to light and deep palpation              No guarding or rebound tenderness  Skin:              Warm, dry, well perfused              No rashes or open wounds on exposed skin  MSK:              Moves all extremities              No focal deformities or swelling              Tenderness to palpation over left trapezius musculature and superior anterior chest wall, reproduces pain              Able to internally/externally rotate at the shoulder girdle without pain   Positive Spurling test when leaning towards left side  Neuro:              Alert              5/5  strength              5/5 elbow flexion/extension              SILT in BUE              Answers questions appropriately              Moves all extremities equally              Gait stable  Psych:              Normal affect, normal mood    Emergency Department Course   ECG:  Indication: Chest pain  Completed at 2041.  Read at 2041.   Rate 84 bpm. OK interval 166. QRS duration 104. QT/QTc 396/467. P-R-T axes 27 -9 35.  Normal sinus rhythm  Normal ECG    Imaging:  Radiographic findings were communicated with the patient who voiced understanding of the findings.     XR Chest  2 Views  The cardiac silhouette is mildly enlarged, though accentuated by AP technique. The left hemidiaphragm is elevated. The lungs are clear. No visible pneumothorax or pleural effusion. Mild acromioclavicular arthritic change bilaterally.  Reading per radiology.    Laboratory:  Troponin (Collected 2050): <0.015  CMP: Anion Gap 1 (L) o/w AWNL (Creatinine 0.86)  CBC: WBC 12.3 (H) o/w WNL. (HGB 13.9, )     Interventions:  2108 Toradol 15 mg IV  2227 Lidocare 4% 1 patch Transdermal  2227 Flexeril 10 mg PO    Emergency Department Course:  Nursing notes and vitals reviewed. 2048 I performed an exam of the patient as documented above.     IV inserted. Medicine administered as documented above. Blood drawn. This was sent to the lab for further testing, results above.    The patient was sent for a chest XR while in the emergency department, findings above.     2200 I rechecked the patient and discussed the results of his workup thus far.     Findings and plan explained to the Patient. Patient discharged home with instructions regarding supportive care, medications, and reasons to return. The importance of close follow-up was reviewed. The patient was prescribed Flexeril.    I personally reviewed the laboratory results with the Patient and answered all related questions prior to discharge.    Impression & Plan      Medical Decision Making:  Willard Greenwood is a 38-year-old male presenting to the emergency department for evaluation of chest pain, shoulder pain, and neck pain.  VS on presentation reveal elevated BP, which improved during patient's ED course.  History and clinical exam most consistent with cervical radiculopathy given reproducible tenderness to palpation to the left trapezius musculature, as well as about the superior anterior left upper chest, and left shoulder.  He exhibits a positive Spurling maneuver towards the left side, further suspicious for cervical radiculopathy.  He does not exhibit objective  motor or sensory deficits to his upper extremities and I feel this is unlikely to represent complete nerve compromise, or spinal cord compression requiring emergent imaging or emergent surgical intervention at this time.  He otherwise has no signs or symptoms consistent with myelopathy.  Other causes for pain were considered though felt to be unlikely.  ACS unlikely given the above examination findings.  EKG demonstrates sinus rhythm without findings of acute ischemia.  His troponin is undetectable, though given my low pretest probability for ACS, do feel further emergent cardiac work-up can be deferred safely.  Pulmonary embolism was also considered in light of patient's recent surgical intervention for his left hip dislocation.  However, again given the above examination findings I feel this to be unlikely.  Chest x-ray is negative for pneumothorax or pneumonia.  Note is made of mildly elevated left hemidiaphragm, though this was not a great inspiratory film.  Patient provided above interventions, and noting some improvements in symptoms.  Given the above work-up and exam I feel he is stable for discharge home.  We discussed use of over-the-counter anti-inflammatory medications as well as topical Lidoderm patches.  He was informed not to apply heat overlying these patches.  He will be given a prescription for Flexeril to assist with nerve pain.  I have also recommended follow-up with the [facility] [place].  Referral information was provided.  He may benefit from further evaluation with MRI as an outpatient, though again do not feel this needs to be done emergently.  Patient and family felt comfortable with this plan of care and all questions were answered prior to discharge.    Diagnosis:    ICD-10-CM    1. Cervical radiculopathy M54.12    2. Atypical chest pain R07.89        Disposition:  discharged to home    Discharge Medications:  New Prescriptions    CYCLOBENZAPRINE (FLEXERIL) 5 MG TABLET     Take 1 tablet (5 mg) by mouth 3 times daily as needed for muscle spasms     I, Kavon Damon, am serving as a scribe on 8/27/2019 at 8:40 PM to personally document services performed by Semaj Loya MD based on my observations and the provider's statements to me.     Kavon Damon  8/27/2019   [facility] EMERGENCY DEPARTMENT       Semaj Loya MD  08/27/19 [id number]

## 2019-08-28 NOTE — DISCHARGE INSTRUCTIONS
Please continue with Tylenol or ibuprofen for pain.    You may  Lidoderm patches over-the-counter.  Do not apply heat pads overlying this.    You may use Flexeril to assist with nerve pain.    Please follow-up with the [facility] [place].    Return to the ER with severe pain, arm weakness, numbness, loss of control of your bowel or bladder function or any other new or troubling symptoms

## 2019-08-28 NOTE — ED TRIAGE NOTES
Reports L sideded CP radiating to L shoulder developed after eating spaghetti approx an hr ago . Pain is constant.     Denies taking OTC meds  PTA     ABC intact

## 2019-09-04 ENCOUNTER — OFFICE VISIT (OUTPATIENT)
Dept: NEUROSURGERY | Facility: CLINIC | Age: 39
End: 2019-09-04
Attending: NURSE PRACTITIONER
Payer: COMMERCIAL

## 2019-09-04 VITALS
HEART RATE: 88 BPM | RESPIRATION RATE: 16 BRPM | BODY MASS INDEX: 44.1 KG/M2 | WEIGHT: 315 LBS | SYSTOLIC BLOOD PRESSURE: 138 MMHG | DIASTOLIC BLOOD PRESSURE: 81 MMHG | TEMPERATURE: 97.7 F | OXYGEN SATURATION: 98 % | HEIGHT: 71 IN

## 2019-09-04 DIAGNOSIS — M54.2 CERVICALGIA: Primary | ICD-10-CM

## 2019-09-04 PROCEDURE — G0463 HOSPITAL OUTPT CLINIC VISIT: HCPCS

## 2019-09-04 PROCEDURE — 99204 OFFICE O/P NEW MOD 45 MIN: CPT | Performed by: PHYSICIAN ASSISTANT

## 2019-09-04 ASSESSMENT — PAIN SCALES - GENERAL: PAINLEVEL: EXTREME PAIN (8)

## 2019-09-04 ASSESSMENT — MIFFLIN-ST. JEOR: SCORE: 2639.05

## 2019-09-04 NOTE — PROGRESS NOTES
Dr. James Oropeza  [facility]  Neurosurgery Clinic Visit      CC: Neck pain    Primary care Provider: Kory Hudson    Referring Provider:        Reason For Visit:   I was asked to consult on the patient for neck pain.      HPI: Kimo Greenwood is a 38 year old male who presents for evaluation of his chief complaint of neck pain.  He has had 3 weeks of neck pain, with pain that radiates into his shoulders bilaterally.  He initially was evaluated in left-sided chest wall pain.  There was not felt to be any cardiac involvement.  He now describes aching neck pain, which limits his range of motion.  There was no event or injury that he can recall.  He denies any radiating pain into his upper extremities.  He denies any paresthesias in the upper extremities.  He has not had any recent physical therapy or any injections.    Past Medical History:   Diagnosis Date     Asthma      Chronic pain - left hip/leg pain      Hypertension      Marijuana abuse      MDD (major depressive disorder)      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    With associated speech/language delay     Obesity 11/11/2010     LOREN (obstructive sleep apnea)     Uses a CPAP     Seborrheic dermatitis        Past Medical History reviewed with patient during visit.    Past Surgical History:   Procedure Laterality Date     ARTHROPLASTY HIP Left 1/25/2017    Procedure: ARTHROPLASTY HIP;  Surgeon: Bala Randall MD;  Location: [facility] OR     ARTHROPLASTY REVISION HIP Left 6/24/2019    Procedure: Revision left total hip arthroplasty with a head and liner exchange to a Biomet dual mobility head and liner.;  Surgeon: Bala Randall MD;  Location: [facility] OR     BLADDER SURGERY      Alida Ibarra,Interstem stimulator implant     CLOSED REDUCTION HIP Left 6/10/2019    Procedure: Closed reduction under general anesthesia left recurrent prosthetic hip dislocation;  Surgeon: Rafat Cazares MD;  Location:  OR      COLONOSCOPY N/A 10/30/2015    Procedure: COMBINED COLONOSCOPY, SINGLE OR MULTIPLE BIOPSY/POLYPECTOMY BY BIOPSY;  Surgeon: Alexis Jackson MD;  Location:  GI     COLONOSCOPY N/A 11/17/2016    Procedure: COMBINED COLONOSCOPY, SINGLE OR MULTIPLE BIOPSY/POLYPECTOMY BY BIOPSY;  Surgeon: Cat Huber MD;  Location: [facility] GI     ESOPHAGOSCOPY, GASTROSCOPY, DUODENOSCOPY (EGD), COMBINED N/A 11/17/2016    Procedure: COMBINED ESOPHAGOSCOPY, GASTROSCOPY, DUODENOSCOPY (EGD), BIOPSY SINGLE OR MULTIPLE;  Surgeon: Cat Huber MD;  Location: [facility] GI     EXAM UNDER ANESTHESIA, FULGURATE CONDYLOMA ANUS, COMBINED N/A 12/22/2016    Procedure: COMBINED EXAM UNDER ANESTHESIA, FULGURATE CONDYLOMA ANUS;  Surgeon: Nya Cabello MD;  Location: [facility] OR     Past Surgical History reviewed with patient during visit.    Current Outpatient Medications   Medication     albuterol (VENTOLIN HFA) 108 (90 Base) MCG/ACT inhaler     ARIPiprazole (ABILIFY) 5 MG tablet     betamethasone dipropionate (DIPROSONE) 0.05 % external ointment     Cholecalciferol (VITAMIN D3) 2000 units CAPS     FIBER LAXATIVE 0.52 g capsule     FLUoxetine (PROZAC) 20 MG capsule     fluticasone (FLONASE) 50 MCG/ACT spray     hydrochlorothiazide (HYDRODIURIL) 12.5 MG tablet     hydrocortisone 2.5 % cream     ketoconazole (NIZORAL) 2 % shampoo     lisinopril (PRINIVIL/ZESTRIL) 10 MG tablet     mometasone-formoterol (DULERA) 100-5 MCG/ACT inhaler     order for DME     oxybutynin ER (DITROPAN XL) 15 MG 24 hr tablet     pantoprazole (PROTONIX) 40 MG EC tablet     prazosin (MINIPRESS) 1 MG capsule     topiramate (TOPAMAX) 25 MG tablet     No current facility-administered medications for this visit.        Allergies   Allergen Reactions     No Clinical Screening - See Comments Other (See Comments)     Awoke from anesthesia with anger and ripping off dressing, after interstim placement, outside hospital 2013       Social History     Socioeconomic  "History     Marital status: Single     Spouse name: None     Number of children: None     Years of education: None     Highest education level: None   Occupational History     None   Social Needs     Financial resource strain: None     Food insecurity:     Worry: None     Inability: None     Transportation needs:     Medical: None     Non-medical: None   Tobacco Use     Smoking status: Former Smoker     Packs/day: 0.50     Years: 1.00     Pack years: 0.50     Types: Cigarettes     Smokeless tobacco: Never Used     Tobacco comment: Smokes E Cigs equal to 1 PPD   Substance and Sexual Activity     Alcohol use: Yes     Comment: socially--\"not very often\" \"once a month\"     Drug use: No     Comment: patient denies any marijuana use     Sexual activity: Yes     Partners: Female     Birth control/protection: Condom   Lifestyle     Physical activity:     Days per week: None     Minutes per session: None     Stress: None   Relationships     Social connections:     Talks on phone: None     Gets together: None     Attends [employer] service: None     Active member of club or organization: None     Attends meetings of clubs or organizations: None     Relationship status: None     Intimate partner violence:     Fear of current or ex partner: None     Emotionally abused: None     Physically abused: None     Forced sexual activity: None   Other Topics Concern     Parent/sibling w/ CABG, MI or angioplasty before 65F 55M? No   Social History Narrative     None       Family History   Problem Relation Age of Onset     Anxiety Disorder Mother      Depression Mother      Ulcerative Colitis Mother 18     Breast Cancer Maternal Grandmother      Cerebrovascular Disease Maternal Grandmother      Breast Cancer Maternal Aunt      Cancer Maternal Grandfather         grt uncles colon cancer          ROS: 10 point ROS neg other than the symptoms noted above in the HPI.    Vital Signs: /81   Pulse 88   Temp 97.7  F (36.5  C) (Oral)   Resp " "16   Ht 1.803 m (5' 11\")   Wt (!) 169.2 kg (373 lb)   SpO2 98%   BMI 52.02 kg/m      Examination:  Constitutional:  Alert, well nourished, NAD.  HEENT: Normocephalic, atraumatic.   Pulmonary:  Without shortness of breath, normal effort.   Lymph: no lymphadenopathy to low back or LE.   Integumentary: Skin is free of rashes or lesions.   Cardiovascular:  No pitting edema of BLE.    Psych: Normal affect, no apparent distress    Neurological:  Awake  Alert  Oriented x 3  Speech clear  Cranial nerves II - XII grossly intact  Motor exam   Shoulder Abduction:  Right:  5/5   Left:  5/5  Biceps:                      Right:  5/5   Left:  5/5  Triceps:                     Right:  5/5   Left:  5/5  Wrist Extensors:       Right:  5/5   Left:  5/5  Wrist Flexors:           Right:  5/5   Left:  5/5  Intrinsics:                   Right:  5/5   Left:  5/5  Sensation normal to bilateral upper and lower extremities.    Reflexes are 2+ in the brachial radialis and triceps. Negative Cristopher sign bilaterally.    Musculoskeletal:  Gait: Able to stand from a seated position. Normal non-antalgic, non-myelopathic gait.  Able to heel/toe walk without loss of balance  Cervical examination reveals good range of motion.  No tenderness to palpation of the cervical spine or paraspinous muscles bilaterally.    Imaging:       Assessment/Plan:     Cervicalgia    Kimo Greenwood is a 38 year old male.  I did have a discussion with the patient regarding his symptoms.  He has had 3 weeks of neck pain radiating into his shoulders with no event or injury.  He denies any paresthesias in the upper extremities.  He does not have any weakness on examination.  We will check a cervical spine CT for further evaluation.  He cannot have an MRI, as he does have a bladder stimulator.  We will follow-up with him after the CT scan is done, and anticipate getting him started with physical therapy.          Jean Marie Castillo PA-C  [facility]  [place] " [address]  [address]  [place] [address]    Tel [phone]  Pager [phone]

## 2019-09-04 NOTE — NURSING NOTE
"Kimo Greenwood is a 38 year old male who presents for:  Chief Complaint   Patient presents with     Neck Pain     Pt states that the pain radiates to bilateral shoulders        Initial Vitals:  /81   Pulse 88   Temp 97.7  F (36.5  C) (Oral)   Resp 16   Ht 5' 11\" (1.803 m)   Wt (!) 373 lb (169.2 kg)   SpO2 98%   BMI 52.02 kg/m   Estimated body mass index is 52.02 kg/m  as calculated from the following:    Height as of this encounter: 5' 11\" (1.803 m).    Weight as of this encounter: 373 lb (169.2 kg).. Body surface area is 2.91 meters squared. BP completed using cuff size: regular  Extreme Pain (8)        Nursing Comments: Pt present today for neck pain. Pt states that the pain radiates to his bilateral shoulders. Pt states that he is in constant pain of 8/10.        Pedro Pablo Willams, [place]    "

## 2019-09-04 NOTE — LETTER
9/4/2019         RE: Kimo Greenwood  [address] [place] [address]        Dear Colleague,    Thank you for referring your patient, Kimo Greenwood, to the [facility]. Please see a copy of my visit note below.    Dr. James Oropeza  [facility]  Neurosurgery Clinic Visit      CC: Neck pain    Primary care Provider: Kory Hudson    Referring Provider:        Reason For Visit:   I was asked to consult on the patient for neck pain.      HPI: Kimo Greenwood is a 38 year old male who presents for evaluation of his chief complaint of neck pain.  He has had 3 weeks of neck pain, with pain that radiates into his shoulders bilaterally.  He initially was evaluated in left-sided chest wall pain.  There was not felt to be any cardiac involvement.  He now describes aching neck pain, which limits his range of motion.  There was no event or injury that he can recall.  He denies any radiating pain into his upper extremities.  He denies any paresthesias in the upper extremities.  He has not had any recent physical therapy or any injections.    Past Medical History:   Diagnosis Date     Asthma      Chronic pain - left hip/leg pain      Hypertension      Marijuana abuse      MDD (major depressive disorder)      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    With associated speech/language delay     Obesity 11/11/2010     LOREN (obstructive sleep apnea)     Uses a CPAP     Seborrheic dermatitis        Past Medical History reviewed with patient during visit.    Past Surgical History:   Procedure Laterality Date     ARTHROPLASTY HIP Left 1/25/2017    Procedure: ARTHROPLASTY HIP;  Surgeon: Bala Randall MD;  Location: [facility] OR     ARTHROPLASTY REVISION HIP Left 6/24/2019    Procedure: Revision left total hip arthroplasty with a head and liner exchange to a Biomet dual mobility head and liner.;  Surgeon: Bala Randall MD;  Location:  OR      BLADDER SURGERY      [facility], [facility],Interstem stimulator implant     CLOSED REDUCTION HIP Left 6/10/2019    Procedure: Closed reduction under general anesthesia left recurrent prosthetic hip dislocation;  Surgeon: Rafat Cazares MD;  Location: [facility] OR     COLONOSCOPY N/A 10/30/2015    Procedure: COMBINED COLONOSCOPY, SINGLE OR MULTIPLE BIOPSY/POLYPECTOMY BY BIOPSY;  Surgeon: Alexis Jackson MD;  Location: [facility] GI     COLONOSCOPY N/A 11/17/2016    Procedure: COMBINED COLONOSCOPY, SINGLE OR MULTIPLE BIOPSY/POLYPECTOMY BY BIOPSY;  Surgeon: Cat Huber MD;  Location: [facility] GI     ESOPHAGOSCOPY, GASTROSCOPY, DUODENOSCOPY (EGD), COMBINED N/A 11/17/2016    Procedure: COMBINED ESOPHAGOSCOPY, GASTROSCOPY, DUODENOSCOPY (EGD), BIOPSY SINGLE OR MULTIPLE;  Surgeon: Cat Huber MD;  Location: [facility] GI     EXAM UNDER ANESTHESIA, FULGURATE CONDYLOMA ANUS, COMBINED N/A 12/22/2016    Procedure: COMBINED EXAM UNDER ANESTHESIA, FULGURATE CONDYLOMA ANUS;  Surgeon: Nya Cabello MD;  Location: [facility] OR     Past Surgical History reviewed with patient during visit.    Current Outpatient Medications   Medication     albuterol (VENTOLIN HFA) 108 (90 Base) MCG/ACT inhaler     ARIPiprazole (ABILIFY) 5 MG tablet     betamethasone dipropionate (DIPROSONE) 0.05 % external ointment     Cholecalciferol (VITAMIN D3) 2000 units CAPS     FIBER LAXATIVE 0.52 g capsule     FLUoxetine (PROZAC) 20 MG capsule     fluticasone (FLONASE) 50 MCG/ACT spray     hydrochlorothiazide (HYDRODIURIL) 12.5 MG tablet     hydrocortisone 2.5 % cream     ketoconazole (NIZORAL) 2 % shampoo     lisinopril (PRINIVIL/ZESTRIL) 10 MG tablet     mometasone-formoterol (DULERA) 100-5 MCG/ACT inhaler     order for DME     oxybutynin ER (DITROPAN XL) 15 MG 24 hr tablet     pantoprazole (PROTONIX) 40 MG EC tablet     prazosin (MINIPRESS) 1 MG capsule     topiramate (TOPAMAX) 25 MG tablet     No current facility-administered medications for  "this visit.        Allergies   Allergen Reactions     No Clinical Screening - See Comments Other (See Comments)     Awoke from anesthesia with anger and ripping off dressing, after interstim placement, outside hospital 2013       Social History     Socioeconomic History     Marital status: Single     Spouse name: None     Number of children: None     Years of education: None     Highest education level: None   Occupational History     None   Social Needs     Financial resource strain: None     Food insecurity:     Worry: None     Inability: None     Transportation needs:     Medical: None     Non-medical: None   Tobacco Use     Smoking status: Former Smoker     Packs/day: 0.50     Years: 1.00     Pack years: 0.50     Types: Cigarettes     Smokeless tobacco: Never Used     Tobacco comment: Smokes E Cigs equal to 1 PPD   Substance and Sexual Activity     Alcohol use: Yes     Comment: socially--\"not very often\" \"once a month\"     Drug use: No     Comment: patient denies any marijuana use     Sexual activity: Yes     Partners: Female     Birth control/protection: Condom   Lifestyle     Physical activity:     Days per week: None     Minutes per session: None     Stress: None   Relationships     Social connections:     Talks on phone: None     Gets together: None     Attends Yarsani service: None     Active member of club or organization: None     Attends meetings of clubs or organizations: None     Relationship status: None     Intimate partner violence:     Fear of current or ex partner: None     Emotionally abused: None     Physically abused: None     Forced sexual activity: None   Other Topics Concern     Parent/sibling w/ CABG, MI or angioplasty before 65F 55M? No   Social History Narrative     None       Family History   Problem Relation Age of Onset     Anxiety Disorder Mother      Depression Mother      Ulcerative Colitis Mother 18     Breast Cancer Maternal Grandmother      Cerebrovascular Disease Maternal " "Grandmother      Breast Cancer Maternal Aunt      Cancer Maternal Grandfather         grt uncles colon cancer          ROS: 10 point ROS neg other than the symptoms noted above in the HPI.    Vital Signs: /81   Pulse 88   Temp 97.7  F (36.5  C) (Oral)   Resp 16   Ht 1.803 m (5' 11\")   Wt (!) 169.2 kg (373 lb)   SpO2 98%   BMI 52.02 kg/m       Examination:  Constitutional:  Alert, well nourished, NAD.  HEENT: Normocephalic, atraumatic.   Pulmonary:  Without shortness of breath, normal effort.   Lymph: no lymphadenopathy to low back or LE.   Integumentary: Skin is free of rashes or lesions.   Cardiovascular:  No pitting edema of BLE.    Psych: Normal affect, no apparent distress    Neurological:  Awake  Alert  Oriented x 3  Speech clear  Cranial nerves II - XII grossly intact  Motor exam   Shoulder Abduction:  Right:  5/5   Left:  5/5  Biceps:                      Right:  5/5   Left:  5/5  Triceps:                     Right:  5/5   Left:  5/5  Wrist Extensors:       Right:  5/5   Left:  5/5  Wrist Flexors:           Right:  5/5   Left:  5/5  Intrinsics:                   Right:  5/5   Left:  5/5  Sensation normal to bilateral upper and lower extremities.    Reflexes are 2+ in the brachial radialis and triceps. Negative Crisotpher sign bilaterally.    Musculoskeletal:  Gait: Able to stand from a seated position. Normal non-antalgic, non-myelopathic gait.  Able to heel/toe walk without loss of balance  Cervical examination reveals good range of motion.  No tenderness to palpation of the cervical spine or paraspinous muscles bilaterally.    Imaging:       Assessment/Plan:     Cervicalgia    Kimo Greenwood is a 38 year old male.  I did have a discussion with the patient regarding his symptoms.  He has had 3 weeks of neck pain radiating into his shoulders with no event or injury.  He denies any paresthesias in the upper extremities.  He does not have any weakness on examination.  We will check a cervical " spine CT for further evaluation.  He cannot have an MRI, as he does have a bladder stimulator.  We will follow-up with him after the CT scan is done, and anticipate getting him started with physical therapy.          Jean Marie Castillo PA-C  [facility]  [address] [address]    Tel [phone]  Pager [phone]      Again, thank you for allowing me to participate in the care of your patient.        Sincerely,        Jean Marie Castillo PA-C

## 2019-09-07 ENCOUNTER — HOSPITAL ENCOUNTER (OUTPATIENT)
Dept: CT IMAGING | Facility: CLINIC | Age: 39
Discharge: HOME | End: 2019-09-07
Attending: PHYSICIAN ASSISTANT | Admitting: PHYSICIAN ASSISTANT
Payer: COMMERCIAL

## 2019-09-07 DIAGNOSIS — M54.2 CERVICALGIA: ICD-10-CM

## 2019-09-07 PROCEDURE — 72125 CT NECK SPINE W/O DYE: CPT

## 2019-09-09 ENCOUNTER — TELEPHONE (OUTPATIENT)
Dept: FAMILY MEDICINE | Facility: CLINIC | Age: 39
End: 2019-09-09

## 2019-09-09 NOTE — TELEPHONE ENCOUNTER
Prior Authorization Retail Medication Request    Medication/Dose: lidocaine (LIDODERM) 5 % patch  ICD code (if different than what is on RX):    Previously Tried and Failed:  None  Rationale:      [employer]    KEY: [id number]  PATIENT LAST NAME: JOSE ARMANDO  : 1980      Pharmacy Information (if different than what is on RX)  Name:  [id number] [place], [place] - [address] AT [place] [address] & [address]  Phone:  [phone]      Santiago AGUILAR

## 2019-09-12 DIAGNOSIS — L21.9 SEBORRHEIC DERMATITIS: ICD-10-CM

## 2019-09-12 RX ORDER — KETOCONAZOLE 20 MG/ML
SHAMPOO TOPICAL
Qty: 120 ML | Refills: 11 | Status: SHIPPED | OUTPATIENT
Start: 2019-09-12 | End: 2020-09-14

## 2019-09-12 NOTE — TELEPHONE ENCOUNTER
"Requested Prescriptions   Pending Prescriptions Disp Refills     ketoconazole (NIZORAL) 2 % external shampoo [Pharmacy Med Name: KETOCONAZOLE 2% SHA] 120 mL 11     Sig: APPLY TO [facility] TWICE WEEKLY  WASH OFF AFTER 5 MINUTES       Antifungal Agents Passed - 9/12/2019  3:27 PM        Passed - Recent (12 mo) or future (30 days) visit within the authorizing provider's specialty     Patient had office visit in the last 12 months or has a visit in the next 30 days with authorizing provider or within the authorizing provider's specialty.  See \"Patient Info\" tab in inbasket, or \"Choose Columns\" in Meds & Orders section of the refill encounter.              Passed - Not Fluconazole or Terconazole      If oral Fluconazole or Terconazole, may refill if indicated in progress notes.           Passed - Medication is active on med list          "

## 2019-09-12 NOTE — TELEPHONE ENCOUNTER
Prescription approved per [facility] Refill Protocol.  Eliana Concepcion RN

## 2019-09-13 NOTE — TELEPHONE ENCOUNTER
Central Prior Authorization Team   Phone: [phone]    PRIOR AUTHORIZATION DENIED    Medication: lidocaine (LIDODERM) 5 % patch    Denial Date: 9/13/2019    Denial Rational: Medication is only covered for post-herpetic neuralgia.        Appeal Information: If provider would like to appeal we will need a detailed letter of medical necessity to start the process. Then re-route this request back to the PA pool.

## 2019-09-13 NOTE — TELEPHONE ENCOUNTER
Central Prior Authorization Team   Phone: [phone]    PA Initiation    Medication: lidocaine (LIDODERM) 5 % patch  Insurance Company: [employer] Phone [phone] Fax [phone]  Pharmacy Filling the Rx: [id number] [place], [place] - [address] AT [place] [address] & [address]  Filling Pharmacy Phone: [phone]  Filling Pharmacy Fax:    Start Date: 9/13/2019    [id number]

## 2019-09-16 ENCOUNTER — TELEPHONE (OUTPATIENT)
Dept: NEUROSURGERY | Facility: CLINIC | Age: 39
End: 2019-09-16

## 2019-09-16 NOTE — TELEPHONE ENCOUNTER
Patient's mother is calling for the results of his CT scan done on 9/7/19. Will forward message to provider to review.

## 2019-09-17 ENCOUNTER — TRANSFERRED RECORDS (OUTPATIENT)
Dept: HEALTH INFORMATION MANAGEMENT | Facility: CLINIC | Age: 39
End: 2019-09-17

## 2019-09-19 NOTE — TELEPHONE ENCOUNTER
LVM requesting a return call to discuss below results and recommendations.    Per Jean Marie ABBOTT..he has multilevel disc degeneration and facet arthritis most pronounced at C5-6 and C6-7. This could be why his neck is so stiff lately. Would recommend he do PT, and can consider doing MBB/ablation at those levels if he wants.

## 2019-09-20 ENCOUNTER — TELEPHONE (OUTPATIENT)
Dept: NEUROSURGERY | Facility: CLINIC | Age: 39
End: 2019-09-20

## 2019-09-20 DIAGNOSIS — M54.2 CERVICALGIA: Primary | ICD-10-CM

## 2019-09-20 NOTE — TELEPHONE ENCOUNTER
Mother called returning  left on 9/16 regarding results. Informed her that Jean Marie recommends PT and an MBB/ablation if the patient would like. Orders placed for both. No further questions or concerns at this time.

## 2019-09-26 ENCOUNTER — TELEPHONE (OUTPATIENT)
Dept: PALLIATIVE MEDICINE | Facility: CLINIC | Age: 39
End: 2019-09-26

## 2019-09-26 DIAGNOSIS — K59.01 SLOW TRANSIT CONSTIPATION: ICD-10-CM

## 2019-09-26 NOTE — TELEPHONE ENCOUNTER
Prescription approved per [facility] to the [facility] Refill Protocol.  Anupama Ross RN, BSN

## 2019-09-26 NOTE — TELEPHONE ENCOUNTER
FIBER LAXATIVE 0.52 g capsule      Last Written Prescription Date:  06/04/2019  Last Fill Quantity: 28 capsule,   # refills: 3  Last Office Visit: 07/30/2019  Future Office visit:       Routing refill request to provider for review/approval because:  Drug not on the [facility], [facility] or [facility] refill protocol or controlled substance      Santiago AGUILAR

## 2019-09-26 NOTE — TELEPHONE ENCOUNTER
Pre-screening questions for MBB Injections:    Injection to be done at which interventional clinic site? [facility]     Instruct patient to arrive as directed prior to the scheduled appointment time:    Wyjuan carlos and Abbie: 30 minutes before        Procedure ordered by Dr. Jean Marie Castillo     Procedure ordered? Cervical Medial Branch Block    What insurance would patient like us to bill for this procedure? [employer]       [employer]'s comp- Any injection DO NOT SCHEDULE and route to Erin Hammer.      [employer] insurance - If scheduling an SI joint injection DO NOT SCHEDULE and route to Erin Hammer.          MBB's must be scheduled at LEAST two weeks apart for insurance purposes       [employer] - Any injection besides hip/shoulder/knee joint DO NOT SCHEDULE and route to Erin Hammer. She will obtain PA and call pt back to schedule procedure or notify pt of denial.       [employer]- PA required for all MBB's        **BCBS- ALL need to be routed to Erin for review if a PA is needed**    Any chance of pregnancy? Not Applicable   If YES, do NOT schedule and route to RN pool    Is an  needed? No     Patient has a drive home? (mandatory) Yes     Is patient taking any blood thinners (plavix, coumadin, jantoven, warfarin, heparin, pradaxa or dabigatran )? No    If hold needed, do NOT schedule, route to RN pool     Is patient taking any aspirin products? No     If more than 325mg/day do NOT schedule; route to RN pool     For CERVICAL procedures, hold all aspirin products for 6 days.      Does the patient have a bleeding or clotting disorder? No    If YES, okay to schedule AND route to RN nurse pool    **For any patients with platelet count <100, must be forwarded to provider**    Is patient diabetic? No If YES, have them bring their glucometer.    Does patient have an active infection or treated for one within the past week? No    Is patient currently taking any antibiotics?  No    For patients on chronic,  preventative, or prophylacti antibiotics, procedures can be scheduled.     For patients on antibiotics for active or recent infection:    Noah Nicole [place], Burton, Snitzer-antibiotic course must have been completed for 4 days     Is patient currently taking any steroid medications? (i.e. Prednisone, Medrol)  No     For patients on steroid medications:    Waldemar Nicole Burton, Snitzer-steroid course must have been completed for 4 days    Review with patient:  If you are started on any steroids or antibiotics between now and your appointment, you must contact us because it may affect our ability to perform your procedure Informed    Is patient actively being treated for cancer or immunocompromised? No   If YES, do NOT schedule and route to RN    Are you able to get on and off an exam table with minimal or no assistance? Yes   If NO, do NOT schedule and route to RN    Are you able to roll over and lay on your stomach with minimal or no assistance? Yes   If NO, do NOT schedule and route to RN    Any allergies to contrast dye, iodine, shellfish, or numbing and steroid medications? No  (If so, inform nursing and note in scheduling comments.)    Allergies: No clinical screening - see comments     Has the patient had a flu shot or any other vaccinations within 7 days before or after the procedure.  No     Does patient have an MRI/CT?  YES: 2019  (SI joint, hip injections, lumbar sympathetic blocks, and stellate ganglion blocks do not require an MRI)    Was the MRI done w/in the last 3 years?  Yes    Was MRI done at [facility]? Yes      If not, where was it done? N/A       If MRI was not done at [facility], [facility] do NOT schedule and route to nursing.  If pt has an imaging disc, the injection may be scheduled but pt has to bring disc to appt. If they show up w/out disc the injection cannot be done    **Must be scheduled with elapsed time interval of at least 2 weeks and not more than 6  months between the First MBB and the Second MBB**       Medial Branch Block Pre-Procedure Instructions    It is okay to take long acting pain medications (if you are on them) the day of the procedure but try not to take any short acting medications unless absolutely necessary. Informed        Long acting meds would include: Gabapentin (Neurontin), MS Contin, Oxycontin        Short acting meds would include:  Percocet, Oxycodone, Vicodin, Ibuprofen     The day of the procedure, you should try to do things that provoke your pain, since the injection is being done to see if it will relieve your pain . Informed     If your pain level is a 4 out of 10 or less on the day of the procedure, please call [phone] to reschedule.  Informed   Reminders (please tell patient if applicable):      Instructed pt to arrive 30 minutes early for IV start if this is for a cervical procedure, ALL sympathetic (stellate ganglion, hypogastric, or lumbar sympathetic block) and all sedation procedures (RFA, spinal cord stimulation trials). Informed         -IVs are not routinely placed for Dr. Juarez cervical cases        -Dr. Gilbert: no IV needed for CMBB       If NPO for sedation, it is okay to take medications with sips of water (except if they are to hold blood thinners).    *DO take blood pressure medication if it is prescribed*      If this is for a cervical MBB aspirin needs to be held for 6 days.          Do not schedule procedures requiring IV placement in the first appointment of the day or first appointment after lunch. Do NOT schedule at 0745, 0815 or 1245.            For patients 85 or older we recommend having an adult stay w/ them for the remainder of the day.      Does the patient have any questions?

## 2019-10-01 ENCOUNTER — HEALTH MAINTENANCE LETTER (OUTPATIENT)
Age: 39
End: 2019-10-01

## 2019-10-01 ENCOUNTER — THERAPY VISIT (OUTPATIENT)
Dept: PHYSICAL THERAPY | Facility: CLINIC | Age: 39
End: 2019-10-01
Payer: COMMERCIAL

## 2019-10-01 DIAGNOSIS — M54.2 CERVICALGIA: ICD-10-CM

## 2019-10-01 DIAGNOSIS — M54.2 CERVICAL PAIN: ICD-10-CM

## 2019-10-01 PROCEDURE — 97161 PT EVAL LOW COMPLEX 20 MIN: CPT | Mod: GP | Performed by: PHYSICAL THERAPIST

## 2019-10-01 PROCEDURE — 97110 THERAPEUTIC EXERCISES: CPT | Mod: GP | Performed by: PHYSICAL THERAPIST

## 2019-10-01 PROCEDURE — G0283 ELEC STIM OTHER THAN WOUND: HCPCS | Mod: GP | Performed by: PHYSICAL THERAPIST

## 2019-10-01 NOTE — PROGRESS NOTES
[facility] Initial Evaluation  Subjective:  Onset of bilateral cervical pain 4 weeks ago of unknown etiology. Pt referred by MD for physical therapy on 9-20-19    The history is provided by the patient. No  was used.   Type of problem:  Cervical spine   Condition occurred with:  Insidious onset.    Problem details: Pt reports bilateral cervical pain. Pt describes pain as sharp and aching. Pt rates pain as a 9/10.   Patient reports pain:  Cervical right side and cervical left side. Radiates to:  Shoulder right and shoulder left. Associated symptoms:  Loss of motion/stiffness and loss of strength. Symptoms are exacerbated by looking up or down, lifting, rotating head and carrying and relieved by heat and rest.                      Objective:  Standing Alignment:    Cervical/thoracic deviations alignment: forward head and shoulder posture.                    Flexibility/Screens:         Spine:  Decreased left spine flexibility:  Upper Trap and Levator    Decreased right spine flexibility:  Upper Trap and Levator                  Cervical/Thoracic Evaluation    AROM:  AROM Cervical:    Flexion:            40%  Extension:       50%  Rotation:         Left: 10%     Right: 10%  Side Bend:      Left: 50%     Right:  50%    Strength: weak scapular stabiliers  Headaches: none  Cervical Myotomes:  normal                  DTR's:  normal          Cervical Dermatomes:  normal                    Cervical Palpation:    Tenderness present at Left:    Rhomboids; Upper Trap; Levator and Erector Spinae  Tenderness present at Right:    Rhomboids; Upper Trap; Levator and Erector Spinae                                                  General     ROS    Assessment/Plan:    Patient is a 38 year old male with cervical complaints.    Patient has the following significant findings with corresponding treatment plan.                Diagnosis 1:  Bilateral cervical pain  Pain -  hot/cold therapy, electric  stimulation, self management, education and home program  Decreased ROM/flexibility - therapeutic exercise, therapeutic activity and home program  Decreased strength - therapeutic exercise, therapeutic activities and home program    Therapy Evaluation Codes:   1) History comprised of:   Personal factors that impact the plan of care:      None.    Comorbidity factors that impact the plan of care are:      Weakness.     Medications impacting care: None.  2) Examination of Body Systems comprised of:   Body structures and functions that impact the plan of care:      Cervical spine.   Activity limitations that impact the plan of care are:      Lifting, Reading/Computer work and Sleeping.  3) Clinical presentation characteristics are:   Stable/Uncomplicated.  4) Decision-Making    Low complexity using standardized patient assessment instrument and/or measureable assessment of functional outcome.  Cumulative Therapy Evaluation is: Low complexity.    Previous and current functional limitations:  (See Goal Flow Sheet for this information)    Short term and Long term goals: (See Goal Flow Sheet for this information)     Communication ability:  Patient appears to be able to clearly communicate and understand verbal and written communication and follow directions correctly.  Treatment Explanation - The following has been discussed with the patient:   RX ordered/plan of care  Anticipated outcomes  Possible risks and side effects  This patient would benefit from PT intervention to resume normal activities.   Rehab potential is good.    Frequency:  1 X week, once daily  Duration:  for 6 weeks  Discharge Plan:  Achieve all LTG.  Independent in home treatment program.  Reach maximal therapeutic benefit.    Please refer to the daily flowsheet for treatment today, total treatment time and time spent performing 1:1 timed codes.

## 2019-10-02 NOTE — PROGRESS NOTES
[facility] - Procedure Note    Date of Service: 10/2/2019    Procedure performed: Left C3,4,5 medial branch blocks  Diagnosis: Cervical spondylosis; Cervical facet arthropathy  : Jam Byers DO     Indications: [place] is a 38 year old male who is seen at the request of Jean Marie Castillo PA-C for cervical medial branch blocks. The patient describes pain with neck extension/rotation. The pain radiates from his neck to the bilateral shoulders. The patient reports minimal improvement with conservative treatment, including oral medications and PT.    CT was done on 9/7/19 which showed FINDINGS: Examination of the lower cervical spine and cervicothoracic  junction is mildly limited due to photon starvation/quantum mottle  artifact. There is no acute fracture. There is reversal of the normal  cervical lordosis with posterior apex at the C4-C5 level, which may be  due to combination of degenerative changes and patient positioning.  There is moderate disc height loss at C5-C6 and C6-C7, with mild  multilevel disc height loss elsewhere in the cervical spine. Schmorl's  nodes at the C5-C6 and C6-C7 levels, as well as at the superior  endplate of C4. Mild multilevel facet arthropathy is present.     Segmental analysis:  C2-C3: Mild facet arthropathy. No spinal or neural foraminal stenosis.     C3-C4: Shallow disc bulge with mild uncovertebral and facet  arthropathy. No spinal or neural foraminal stenosis.     C4-C5: Shallow disc bulge with minimal uncovertebral and facet  arthrosis. No significant spinal or neural foraminal stenosis.     C5-C6: Small disc bulge with slight asymmetry to the left, and  uncovertebral and facet arthropathy bilaterally. This results in mild  spinal stenosis. No significant neural foraminal stenosis.     C6-C7: Small disc bulge with mild bilateral uncovertebral and facet  arthropathy, greater on the left, resulting in mild spinal stenosis  and mild left  neural foraminal stenosis. The right neural foramen  appears patent.     C7-T1: No spinal or neural foraminal stenosis.                                                                      IMPRESSION: Mild multilevel degenerative disc disease and  uncovertebral and facet arthropathy of the cervical spine, most  pronounced at the C5-C6 and C6-C7 levels. Please see the body of  report for details.     MARLENI REA MD  Allergies:      Allergies   Allergen Reactions     No Clinical Screening - See Comments Other (See Comments)     Awoke from anesthesia with anger and ripping off dressing, after interstim placement, outside hospital 2013        Vitals:  BP (!) 142/89 (BP Location: Right arm, Cuff Size: Adult Large)   Pulse 75   SpO2 95%     Review of Systems: The patient denies recent fever, chills, illness, use of antibiotics or anticoagulants. All other 10-point review of systems negative.     Procedure:   Options/alternatives, benefits and risks were discussed with the patient including bleeding, infection, tissue trauma, exposure to radiation, reaction to medications, spinal cord injury, weakness, numbness and paralysis.  Questions were answered to his satisfaction and he agrees to proceed. Voluntary informed consent was obtained and signed.     After getting informed consent, patient was brought into the procedure suite and was placed in a side-lying position opposite the side of the procedure on the procedure table. A Pause for the Cause was performed. Patient was prepped and draped in sterile fashion.     Under AP fluoroscopic guidance the Left  C3,4,5 articular pillars were identified. The C-arm was rotated to afford optimal visualization. Under intermittent fluoroscopy, a 25 gauge 1.5 inch needle was advanced slowly until it had contact on the mid portion of the articular pillar. Then, the two other needles of the same size and gauge were placed under fluorsoscopic guidance using the same technique. The  needle positions were verified and optimized from the AP and lateral views.    The anatomic targets for the C3,4,5 medial nerves were the C3,4,5 articular pillars, resulting in blockade of the C3-4, 4-5 facet joints.    After negative aspiration, bupivacaine 0.5% 0.5 ml was injected at each location.  The needles were removed. Hemostasis was achieved, the area was cleaned, and bandaids were placed when appropriate. The patient tolerated the procedure well, and was taken to the recovery room. Images were saved to PACS.    Initial plan was to complete bilateral cervical MBB but after completing the left side the patient felt that this was adequately covering his pain area and requested that the procedure be stopped.    Assessment/Plan: Kimo Greenwood is a 38 year old male s/p Left C3,4,5 medial branch blocks today for cervical spondylosis, facet arthropathy.     Pre procecedure pain score: 8-9/10   Post procedure pain score: 7-9/10.   The patient will continue to monitor progress, and they were given a pain diary to complete at home.  They will either fax or mail this back to us or bring it to their next appointment. We will determine the treatment plan after we review the diary.      1. The patient was advised to contact the [facility] for any of the following:   Fever, chills, or night sweats   New onset of pain, numbness, or weakness   Any questions/concerns regarding the procedure  If unable to contact the Pain Center, the patient was instructed to go to a local Emergency Room for any complications.   2. Initial plan was to complete bilateral cervical MBB but after completing the left side the patient felt that this was adequately covering his pain area and requested that the procedure be stopped.  2. The patient will receive a follow-up call in 1 week.  3. We will await the pain diary to determent next step of the treatment plan and call patient to schedule.    Jam Byers,    [facility]

## 2019-10-03 ENCOUNTER — ANCILLARY PROCEDURE (OUTPATIENT)
Dept: GENERAL RADIOLOGY | Facility: CLINIC | Age: 39
End: 2019-10-03
Attending: PHYSICAL MEDICINE & REHABILITATION
Payer: COMMERCIAL

## 2019-10-03 ENCOUNTER — RADIOLOGY INJECTION OFFICE VISIT (OUTPATIENT)
Dept: PALLIATIVE MEDICINE | Facility: CLINIC | Age: 39
End: 2019-10-03
Payer: COMMERCIAL

## 2019-10-03 VITALS — OXYGEN SATURATION: 95 % | DIASTOLIC BLOOD PRESSURE: 89 MMHG | HEART RATE: 75 BPM | SYSTOLIC BLOOD PRESSURE: 142 MMHG

## 2019-10-03 DIAGNOSIS — M47.812 FACET ARTHROPATHY, CERVICAL: ICD-10-CM

## 2019-10-03 DIAGNOSIS — M54.2 CERVICAL PAIN: Primary | ICD-10-CM

## 2019-10-03 PROCEDURE — 64490 INJ PARAVERT F JNT C/T 1 LEV: CPT | Mod: LT | Performed by: PHYSICAL MEDICINE & REHABILITATION

## 2019-10-03 PROCEDURE — 64491 INJ PARAVERT F JNT C/T 2 LEV: CPT | Mod: LT | Performed by: PHYSICAL MEDICINE & REHABILITATION

## 2019-10-03 NOTE — PATIENT INSTRUCTIONS
[facility]   Medial Branch Block Discharge Instructions      Your procedure was performed by:   Modesta Agustin    Medications used:  bupivacaine, omnipaque     You will need to complete the Pain Scale Log form and return it to us as soon as possible.  Once we have received the form, we will review it and call you to determine the next steps.     The form can be faxed to [phone] or mailed to:   [facility]    [id number] [place] [address], [address] [id number]      You may resume your regular activity after the injection.    Be cautious due to the anesthetic. You may have numbness to the shoulder/arm.     Avoid driving for 6 hours. The local anesthetic could slow your reflexes    You may shower, however no swimming or tub baths or hot tubs for 24 hours following your procedure.    Your pain will return after the numbing medications have worn off.  You may use your current pain medications as needed.    Unless you have been directed to avoid the use of anti-inflammatory medications (NSAIDS), you may use medications such as ibuprofen, Aleve or Tylenol for pain control if needed. Some people find it helpful to alternate ibuprofen and Tylenol every 3 hours for a couple of days.    You may use ice packs 10-15 minutes three to four times a day at the injection site for comfort.     Do not use heat to painful areas for 6 to 8 hours. This will give the local anesthetic time to wear off and prevent you from accidentally burning your skin.     If you experience any of the following, call the Pain Clinic during work hours at [phone] or the Provider Line after hours at [phone]:  -Fever over 100 degree F  -Swelling, bleeding, redness, drainage, warmth at the injection site  -Progressive weakness or numbness on your  arms  -If cervical, call if you have any unusual headache that is not relieved by Tylenol  -Unusual new onset of  pain that is not improving

## 2019-10-03 NOTE — NURSING NOTE
Discharge Information    IV Discontiued Time:  NA    Amount of Fluid Infused:  NA    Discharge Criteria = When patient returns to baseline or as per MD order    Consciousness:  Pt is fully awake    Circulation:  BP +/- 20% of pre-procedure level    Respiration:  Patient is able to breathe deeply    O2 Sat:  Patient is able to maintain O2 Sat >92% on room air    Activity:  Moves 4 extremities on command    Ambulation:  Patient is able to stand and walk or stand and pivot into wheelchair    Dressing:  Clean/dry or No Dressing    Notes:   Discharge instructions and AVS given to patient    Patient meets criteria for discharge?  YES    Admitted to PCU?  No    Responsible adult present to accompany patient home?  Yes    Signature/Title:    Joselin Arora RN Care Coordinator  [facility]

## 2019-10-03 NOTE — Clinical Note
Initial plan was to complete bilateral cervical MBB but after completing the left side the patient felt that this was adequately covering his pain area and requested that the procedure be stopped.Thanks for the referral,[facility]

## 2019-10-10 ENCOUNTER — THERAPY VISIT (OUTPATIENT)
Dept: CHIROPRACTIC MEDICINE | Facility: CLINIC | Age: 39
End: 2019-10-10
Payer: COMMERCIAL

## 2019-10-10 DIAGNOSIS — G89.29 CHRONIC BILATERAL LOW BACK PAIN WITH LEFT-SIDED SCIATICA: ICD-10-CM

## 2019-10-10 DIAGNOSIS — M53.3 SACRAL PAIN: ICD-10-CM

## 2019-10-10 DIAGNOSIS — M99.04 SOMATIC DYSFUNCTION OF SACRAL REGION: ICD-10-CM

## 2019-10-10 DIAGNOSIS — M54.42 CHRONIC BILATERAL LOW BACK PAIN WITH LEFT-SIDED SCIATICA: ICD-10-CM

## 2019-10-10 DIAGNOSIS — M25.552 HIP PAIN, LEFT: ICD-10-CM

## 2019-10-10 DIAGNOSIS — M99.02 THORACIC SEGMENT DYSFUNCTION: ICD-10-CM

## 2019-10-10 DIAGNOSIS — M99.03 SOMATIC DYSFUNCTION OF LUMBAR REGION: Primary | ICD-10-CM

## 2019-10-10 PROCEDURE — 99203 OFFICE O/P NEW LOW 30 MIN: CPT | Mod: GA | Performed by: CHIROPRACTOR

## 2019-10-10 PROCEDURE — 98941 CHIROPRACT MANJ 3-4 REGIONS: CPT | Mod: AT | Performed by: CHIROPRACTOR

## 2019-10-11 ENCOUNTER — TELEPHONE (OUTPATIENT)
Dept: PALLIATIVE MEDICINE | Facility: CLINIC | Age: 39
End: 2019-10-11

## 2019-10-11 ENCOUNTER — THERAPY VISIT (OUTPATIENT)
Dept: PHYSICAL THERAPY | Facility: CLINIC | Age: 39
End: 2019-10-11
Payer: COMMERCIAL

## 2019-10-11 DIAGNOSIS — M54.50 LUMBAR PAIN: ICD-10-CM

## 2019-10-11 PROCEDURE — 97110 THERAPEUTIC EXERCISES: CPT | Mod: GP | Performed by: PHYSICAL THERAPIST

## 2019-10-11 PROCEDURE — 97530 THERAPEUTIC ACTIVITIES: CPT | Mod: GP | Performed by: PHYSICAL THERAPIST

## 2019-10-11 PROCEDURE — 97161 PT EVAL LOW COMPLEX 20 MIN: CPT | Mod: GP | Performed by: PHYSICAL THERAPIST

## 2019-10-11 NOTE — PROGRESS NOTES
[facility] Initial Evaluation  Subjective:  Pt reports a history of scoliosis with increased lumbar pain 2 years ago. Previous treatment of injections has given pt some relief with his back pain. Pt referred by MD for physical therapy on 8-8-19    The history is provided by the patient. No  was used.   Type of problem:  Lumbar   Condition occurred with:  Other reason.    Problem details: Pt describes pain as sharp and aching and rates pain as a 8/10.   Patient reports pain:  Lumbar spine right and lumbar spine left. Radiates to:  No radiation. Associated symptoms:  Loss of motion/stiffness and loss of strength. Symptoms are exacerbated by walking, standing, lifting, carrying, bending and twisting and relieved by rest.                      Objective:  Standing Alignment:        Lumbar deviations alignment: increased lordosis.                Flexibility/Screens:       Lower Extremity:  Decreased left lower extremity flexibility:Hip Flexors and Hamstrings    Decreased right lower extremity flexibility:  Hip Flexors and Hamstrings               Lumbar/SI Evaluation  ROM:    AROM Lumbar:   Flexion:            Max loss  Ext:                    Max loss   Side Bend:        Left:  Moderate loss    Right:  Moderate loss  Rotation:           Left:  Max loss    Right:  Max loss  Side Glide:        Left:     Right:         Strength: weak lower abdominals and pelvic stabilizers  Lumbar Myotomes:  normal            Lumbar DTR's:  normal        Lumbar Dermtomes:  normal                Neural Tension/Mobility:    Left side:  SLR positive.   Right side:   SLR positive.  Lumbar Palpation:  Palpation (lumbar): point tenderness L2-L5 spinous processes and adjacent lumbar paraspinals.                                                         General     ROS    Assessment/Plan:    Patient is a 38 year old male with lumbar complaints.    Patient has the following significant findings with  corresponding treatment plan.                Diagnosis 1:  Lumbar pain  Pain -  hot/cold therapy, manual therapy, self management, education and home program  Decreased ROM/flexibility - manual therapy, therapeutic exercise, therapeutic activity and home program  Decreased strength - therapeutic exercise, therapeutic activities and home program    Therapy Evaluation Codes:   1) History comprised of:   Personal factors that impact the plan of care:      None.    Comorbidity factors that impact the plan of care are:      Weakness.     Medications impacting care: None.  2) Examination of Body Systems comprised of:   Body structures and functions that impact the plan of care:      Lumbar spine.   Activity limitations that impact the plan of care are:      Bending, Lifting, Sitting, Standing and Walking.  3) Clinical presentation characteristics are:   Stable/Uncomplicated.  4) Decision-Making    Low complexity using standardized patient assessment instrument and/or measureable assessment of functional outcome.  Cumulative Therapy Evaluation is: Low complexity.    Previous and current functional limitations:  (See Goal Flow Sheet for this information)    Short term and Long term goals: (See Goal Flow Sheet for this information)     Communication ability:  Patient appears to be able to clearly communicate and understand verbal and written communication and follow directions correctly.  Treatment Explanation - The following has been discussed with the patient:   RX ordered/plan of care  Anticipated outcomes  Possible risks and side effects  This patient would benefit from PT intervention to resume normal activities.   Rehab potential is good.    Frequency:  1 X week, once daily  Duration:  for 6 weeks  Discharge Plan:  Achieve all LTG.  Independent in home treatment program.  Reach maximal therapeutic benefit.      Please refer to the daily flowsheet for treatment today, total treatment time and time spent performing 1:1  timed codes.

## 2019-10-11 NOTE — TELEPHONE ENCOUNTER
Patient had a Left C3,4,5 medial branch blocks on 10/3/19.   Called patient for an update. Pt reported the following details:  Immediate post injection relief. Did state he had some soreness at the injection sites when the medication wore off.    If needed, remind pt that this procedure is not supposed to give them long term pain relief, only a few hours.     Has patient sent back the post injection form?NO- to be mailed on 10/8/19-10/9/19     If this is medial branch block #1 does patient want to schedule medial branch block #2? YES    If this is medial branch block #2 does patient want to proceed w/ the radiofrequency ablation procedure? YES. (if applicable)    States that He has a nurse check 10/24    If yes, inform pt that we will have this information sent to our support staff to  check insurance coverage.  Once we hear from insurance pt will be called.  This can take anywhere from 1 week to 30 days.

## 2019-10-13 PROBLEM — M99.04 SOMATIC DYSFUNCTION OF SACRAL REGION: Status: ACTIVE | Noted: 2019-10-13

## 2019-10-13 PROBLEM — M25.552 HIP PAIN, LEFT: Status: ACTIVE | Noted: 2019-10-13

## 2019-10-13 PROBLEM — M99.03 SOMATIC DYSFUNCTION OF LUMBAR REGION: Status: ACTIVE | Noted: 2019-10-13

## 2019-10-13 PROBLEM — M53.3 SACRAL PAIN: Status: ACTIVE | Noted: 2019-10-13

## 2019-10-13 PROBLEM — M99.02 THORACIC SEGMENT DYSFUNCTION: Status: ACTIVE | Noted: 2019-10-13

## 2019-10-14 NOTE — PROGRESS NOTES
Initial Chiropractic Clinic Visit    PCP: Kory Hudson    Kimoemely Greenwood is a 38 year old male who is seen  in consultation at the request of  Yessy Vyas C.N.P. presenting with chronic low back pain, L hip and sacral pain . Patient reports that the onset was over 7 years ago however increased in the past 2 years.  When asked, patient denies:, falling, slipping, bending and reaching or sleeping awkwardly. The pt reports having a left hip replacement 2 years ago and noting an increase in L sided hip and low back pain. He has had low back pain for the past 7 years.The pt grades the px a 8-10 on VAS. He is currently in Chiropractic and is about 5-6% improved overall. He is having difficulty sitting and walking 1 mile.  Prior to onset, the patient was able to sit for 3 hours. Patient notes that due to symptoms, they can only sit for 1 hour due to pain. Kimo Greenwood notes   sitting rated at a 8/10 painful, difficult and prior to this incident it was 4/10.        Injury: There was no acute injury related to this episode     Location of Pain: left hip and low back at the following level(s) T5 , T9 , L5 , Sacrum  and PSIS Left   Duration of Pain: 2 years    Rating of Pain at worst: 10/10  Rating of Pain Currently: 8/10  Symptoms are better with: some Chiropractic   Symptoms are worse with: sitting and walking   Additional Features: none      Health History  as reported by the patient:    How does the patient rate their own health:   Good    Current or past medical history:   High blood pressure    Medical allergies  None    Past Traumas/Surgeries  Orthopedic: L hip replacement     Family History  Family History   Problem Relation Age of Onset     Anxiety Disorder Mother      Depression Mother      Ulcerative Colitis Mother 18     Breast Cancer Maternal Grandmother      Cerebrovascular Disease Maternal Grandmother      Breast Cancer Maternal Aunt      Cancer Maternal Grandfather         grt uncles colon  cancer       Medications:  See chart     Occupation:  Student     Primary job tasks:   Prolonged sitting    Barriers as home/work:   none    Additional health Issues:     None             Kimo was asked to complete the Oswestry Low Back Disability Index and Holden Start Back screening tool, today in the office.  Disability score: 28%. Keel Start Total Score:8 Sub Score: 5     Review of Systems  Musculoskeletal: as above  Remainder of review of systems is negative including constitutional, CV, pulmonary, GI, Skin and Neurologic except as noted in HPI or medical history.    Past Medical History:   Diagnosis Date     Asthma      Chronic pain - left hip/leg pain      Hypertension      Marijuana abuse      MDD (major depressive disorder)      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    With associated speech/language delay     Obesity 11/11/2010     LOREN (obstructive sleep apnea)     Uses a CPAP     Seborrheic dermatitis      Past Surgical History:   Procedure Laterality Date     ARTHROPLASTY HIP Left 1/25/2017    Procedure: ARTHROPLASTY HIP;  Surgeon: Bala Randall MD;  Location: [facility] OR     ARTHROPLASTY REVISION HIP Left 6/24/2019    Procedure: Revision left total hip arthroplasty with a head and liner exchange to a Biomet dual mobility head and liner.;  Surgeon: Bala Randall MD;  Location:  OR     BLADDER SURGERY      [facility],Interstem stimulator implant     CLOSED REDUCTION HIP Left 6/10/2019    Procedure: Closed reduction under general anesthesia left recurrent prosthetic hip dislocation;  Surgeon: Rafat Cazares MD;  Location:  OR     COLONOSCOPY N/A 10/30/2015    Procedure: COMBINED COLONOSCOPY, SINGLE OR MULTIPLE BIOPSY/POLYPECTOMY BY BIOPSY;  Surgeon: Alexis Jackson MD;  Location:  GI     COLONOSCOPY N/A 11/17/2016    Procedure: COMBINED COLONOSCOPY, SINGLE OR MULTIPLE BIOPSY/POLYPECTOMY BY BIOPSY;  Surgeon: Cat Huber MD;  Location:  "[facility] GI     ESOPHAGOSCOPY, GASTROSCOPY, DUODENOSCOPY (EGD), COMBINED N/A 11/17/2016    Procedure: COMBINED ESOPHAGOSCOPY, GASTROSCOPY, DUODENOSCOPY (EGD), BIOPSY SINGLE OR MULTIPLE;  Surgeon: Cat Huber MD;  Location: [facility] GI     EXAM UNDER ANESTHESIA, FULGURATE CONDYLOMA ANUS, COMBINED N/A 12/22/2016    Procedure: COMBINED EXAM UNDER ANESTHESIA, FULGURATE CONDYLOMA ANUS;  Surgeon: Nya Cabello MD;  Location: [facility] OR     Objective  There were no vitals taken for this visit.      GENERAL APPEARANCE: healthy, alert and no distress   GAIT: NORMAL  SKIN: no suspicious lesions or rashes  NEURO: Normal strength and tone, mentation intact and speech normal  PSYCH:  mentation appears normal and affect normal/bright    Low back exam:    Inspection:  \"     no visible deformity in the low back       normal skin\"  Slumped seated posture, anterior pelvic flexion with sacral/coccyx seated posture, Increased thoracic kyphosis with subsequent anterior cervical carriage. Poor seated posture      ROM:       limited flexion due to pain       limited extension due to pain    Tender:       paraspinal muscles       B QL     Non Tender:       remainder of lumbar spine    Strength:       hip flexion 5/5 Normal       knee extension 5/5 Normal       ankle dorsiflexion 5/5 Normal       ankle plantarflexion 5/5 Normal       dorsiflexion of the great toe 5/5 Normal    Reflexes:       patellar (L3, L4) 2 bilaterally       achilles tendons (S1) 2 bilaterally    Sensation:      grossly intact throughout lower extremities    Special tests:  Kemps - Right positive, low back pain and Left positive, low back pain, SLR - Right negative and Left positive, hip pain, Gaenslen's - Right negative and Left negative and Fabere - Right negative and Left positive, hip pain    Segmental spinal dysfunction/restrictions found at:  T5 , T9 , L5 , Sacrum  and PSIS Left       The following soft tissue hypotonicities were observed:Quad lumb: " bilateral, referred pain: no  T paraspinals: ache and dull pain, no    Trigger points were found in: none     Muscle spasm found in:Lumbar erector spine and Quad lumb      Radiology:  None     Assessment:    1. Somatic dysfunction of lumbar region    2. Chronic bilateral low back pain with left-sided sciatica    3. Somatic dysfunction of sacral region    4. Sacral pain    5. Thoracic segment dysfunction    6. Hip pain, left        RX ordered/plan of care  Anticipated outcomes  Possible risks and side effects    After discussing the risk and benefits of care, patient consented to treatment    Prognosis: Guarded      Patient's condition:  Patient had restrictions pre-manipulation    Treatment effectiveness:  Post manipulation there is better intersegmental movement and Patient claims to feel looser post manipulation      Plan:    Procedures:  Evaluation and Management:  60566 Moderate level exam 30 min    CMT:  62914 Chiropractic manipulative treatment 3-4 regions performed   Thoracic: Diversified, T5, T9, Prone  Lumbar: Diversified, L5, Prone, Side posture  Pelvis: Drop Table, Sacrum , PSIS Left , Prone    Modalities:  None performed this visit    Therapeutic procedures:  None    Response to Treatment  Reduction in symptoms as reported by patient      Treatment plan and goals:  Goals:  SITTING: the patient specific goal is to attain pre-injury status of  8 hours comfortably  Walking 2 miles     Frequency of care  Duration of care is estimated to be 6-8 weeks, from the initial treatment.  It is estimated that the patient will need a total of 6-8 visits to resolve this episode.  For the initial therapeutic trial of care, the frequency is recommended at 1 X week, once daily.  A reevaluation would be clinically appropriate in 6-8 visits, to determine progress and further course of care.    In-Office Treatment  Evaluation  Spinal Chiropractic Manipulative Therapy  Acupuncture  discussion        Recommendations:    Instructions:  none     Follow-up:    Return to care in 1 week with ACP.       Discussed the assessment with the patient.      Disclaimer: This note consists of symbols derived from keyboarding, dictation and/or voice recognition software. As a result, there may be errors in the script that have gone undetected. Please consider this when interpreting information found in this chart.

## 2019-10-14 NOTE — TELEPHONE ENCOUNTER
Cervical MBB#1 pain data reviewed. Max relief obtained:     At rest:     15%-hour 1, 29% hour 2, 43%hour2-3  Left facet load:    40%hour1, 50%hour 2-3, 60% 4-6    Right facet load: 40%hour1, 50%hour 2-3, 60% hour 4-6    Normal activity:  40%hour1, 50%hour 2-3, 60% hour 4-6      MBB to RFA order verified:  No- only ordered for MBB  Insurance coverage verified with FAWN Hammer: OK to proceed to MBB or RFA: please verify coverage criteria needed to proceed    Do MBB scores fall within criteria to proceed per insurance? If so-     to schedule MBB#2       Chelsi CHONGN, RN Care Coordinator  [facility]

## 2019-10-15 NOTE — TELEPHONE ENCOUNTER
Called patient. Left message asking him to call us back.  If he wants to proceed with the MBB #2 we can get that set up for him. He met the criteria.  Also part of the criteria per his insurance before they will approve the RFA he needs to do 4 weeks of Physical Therapy and he needs to meet the criteria for the MBB#2.     Joselin Arora RN  Care Coordinator  [facility]

## 2019-10-15 NOTE — TELEPHONE ENCOUNTER
[employer] RFA REQUIREMENTS- NO PA REQUIRED    [employer] [place]  o 6 months failed conservative treatment (medications, joint injections) This includes 4 weeks of PT or an unfavorable evaluation;   o 2 successful workups resulting in >50% pain relief.    Okay to schedule MBB #2        Erin BRIGGS    [facility]

## 2019-10-17 NOTE — TELEPHONE ENCOUNTER
Chart review: Is scheduled for 10/24/19 for MBB #2. Will discuss with patient at this appt    Chelsi AMADO, RN Care Coordinator  [facility]

## 2019-10-25 ENCOUNTER — OFFICE VISIT (OUTPATIENT)
Dept: FAMILY MEDICINE | Facility: CLINIC | Age: 39
End: 2019-10-25
Payer: COMMERCIAL

## 2019-10-25 VITALS
WEIGHT: 315 LBS | TEMPERATURE: 98.2 F | HEART RATE: 75 BPM | HEIGHT: 71 IN | OXYGEN SATURATION: 97 % | DIASTOLIC BLOOD PRESSURE: 73 MMHG | BODY MASS INDEX: 44.1 KG/M2 | SYSTOLIC BLOOD PRESSURE: 125 MMHG

## 2019-10-25 DIAGNOSIS — I10 HYPERTENSION GOAL BP (BLOOD PRESSURE) < 140/90: ICD-10-CM

## 2019-10-25 DIAGNOSIS — E66.01 MORBID OBESITY (H): ICD-10-CM

## 2019-10-25 DIAGNOSIS — Z23 NEED FOR PROPHYLACTIC VACCINATION AND INOCULATION AGAINST INFLUENZA: ICD-10-CM

## 2019-10-25 DIAGNOSIS — N39.44 NOCTURNAL ENURESIS: ICD-10-CM

## 2019-10-25 DIAGNOSIS — Z01.818 PREOP GENERAL PHYSICAL EXAM: Primary | ICD-10-CM

## 2019-10-25 DIAGNOSIS — J45.20 MILD INTERMITTENT ASTHMA WITHOUT COMPLICATION: ICD-10-CM

## 2019-10-25 DIAGNOSIS — G47.33 OSA (OBSTRUCTIVE SLEEP APNEA): ICD-10-CM

## 2019-10-25 PROCEDURE — 90471 IMMUNIZATION ADMIN: CPT | Performed by: PHYSICIAN ASSISTANT

## 2019-10-25 PROCEDURE — 90686 IIV4 VACC NO PRSV 0.5 ML IM: CPT | Performed by: PHYSICIAN ASSISTANT

## 2019-10-25 PROCEDURE — 99214 OFFICE O/P EST MOD 30 MIN: CPT | Performed by: PHYSICIAN ASSISTANT

## 2019-10-25 ASSESSMENT — MIFFLIN-ST. JEOR: SCORE: 2613.19

## 2019-10-25 NOTE — PROGRESS NOTES
[facility]  [id number] [place] [id number] [phone]  Dept: [phone]    PRE-OP EVALUATION:  Today's date: 10/25/2019    Kimo Greenwood (: 1980) presents for pre-operative evaluation assessment as requested by Dr. Ibarra.  He requires evaluation and anesthesia risk assessment prior to undergoing surgery/procedure for treatment of new interstim lead, replace old .      [facility] OR  Fax number for surgical facility: [phone]   Primary Physician: Kory Hudson  Type of Anesthesia Anticipated: General    Patient has a Health Care Directive or Living Will:  NO    Preop Questions 10/25/2019   Who is doing your surgery? segle   What are you having done? intrestem   Date of Surgery/Procedure: 1030   Facility or Hospital where procedure/surgery will be performed: [facility]   1.  Do you have a history of Heart attack, stroke, stent, coronary bypass surgery, or other heart surgery? No   2.  Do you ever have any pain or discomfort in your chest? No   3.  Do you have a history of  Heart Failure? No   4.   Are you troubled by shortness of breath when:  walking on a level surface, or up a slight hill, or at night? No   5.  Do you currently have a cold, bronchitis or other respiratory infection? No   6.  Do you have a cough, shortness of breath, or wheezing? No   7.  Do you sometimes get pains in the calves of your legs when you walk? No   8. Do you or anyone in your family have previous history of blood clots? No   9.  Do you or does anyone in your family have a serious bleeding problem such as prolonged bleeding following surgeries or cuts? No   10. Have you ever had problems with anemia or been told to take iron pills? No   11. Have you had any abnormal blood loss such as black, tarry or bloody stools? No   12. Have you ever had a blood transfusion? No   13. Have you or any of your relatives ever had problems with anesthesia? No   14. Do you have sleep apnea,  excessive snoring or daytime drowsiness? YES - personal history of sleep apnea  Uses c-pap   15. Do you have any prosthetic heart valves? No   16. Do you have prosthetic joints? YES - hip replacement          HPI:     HPI related to upcoming procedure: wets bed at night       ASTHMA - Patient has a longstanding history of moderate-severe Asthma . Patient has been doing well overall noting NO SYMPTOMS and continues on medication regimen consisting of dulera without adverse reactions or side effects.     DEPRESSION - Patient has a long history of Depression of moderate severity requiring medication for control with recent symptoms being stable..Current symptoms of depression include none.     HYPERTENSION - Patient has longstanding history of HTN , currently denies any symptoms referable to elevated blood pressure. Specifically denies chest pain, palpitations, dyspnea, orthopnea, PND or peripheral edema. Blood pressure readings have been in normal range. Current medication regimen is as listed below. Patient denies any side effects of medication.     SLEEP PROBLEM - Patient has a longstanding history of snoring.. Patient has tried OTC medications with limited success.       MEDICAL HISTORY:     Patient Active Problem List    Diagnosis Date Noted     Somatic dysfunction of lumbar region 10/13/2019     Priority: Medium     Somatic dysfunction of sacral region 10/13/2019     Priority: Medium     Sacral pain 10/13/2019     Priority: Medium     Thoracic segment dysfunction 10/13/2019     Priority: Medium     Hip pain, left 10/13/2019     Priority: Medium     Lumbar pain 10/11/2019     Priority: Medium     Cervical pain 10/01/2019     Priority: Medium     Mild intermittent asthma 05/08/2017     Priority: Medium     Patel's esophagus determined by biopsy 03/27/2017     Priority: Medium     Next upper GI endoscopy (EGD) 11/2019       Vitamin D deficiency 03/20/2017     Priority: Medium     LPRD (laryngopharyngeal reflux  disease) 03/20/2017     Priority: Medium     S/P total hip arthroplasty 01/25/2017     Priority: Medium     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     Priority: Medium     History of colonic polyps 09/28/2016     Priority: Medium     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 None 11/2016. Next colonoscopy 11/2021 if not sooner.       Bilateral low back pain with left-sided sciatica 10/08/2015     Priority: Medium     Chronic low back pain 10/06/2015     Priority: Medium     Suicidal ideation 08/23/2015     Priority: Medium     Leukocytosis 06/09/2014     Priority: Medium     Morbid obesity (H) 05/20/2014     Priority: Medium     Hypertension goal BP (blood pressure) < 140/90 02/03/2014     Priority: Medium     LOREN (obstructive sleep apnea) 04/23/2013     Priority: Medium     CPAP       Moderate major depression (H) 01/15/2013     Priority: Medium     Speech/language delay 11/11/2010     Priority: Medium     Tobacco use disorder 11/11/2010     Priority: Medium     Nocturnal enuresis      Priority: Medium      Past Medical History:   Diagnosis Date     Asthma      Chronic pain - left hip/leg pain      Hypertension      Marijuana abuse      MDD (major depressive disorder)      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    With associated speech/language delay     Obesity 11/11/2010     LOREN (obstructive sleep apnea)     Uses a CPAP     Seborrheic dermatitis      Past Surgical History:   Procedure Laterality Date     ARTHROPLASTY HIP Left 1/25/2017    Procedure: ARTHROPLASTY HIP;  Surgeon: Bala Randall MD;  Location: [facility] OR     ARTHROPLASTY REVISION HIP Left 6/24/2019    Procedure: Revision left total hip arthroplasty with a head and liner exchange to a Biomet dual mobility head and liner.;  Surgeon: Bala Randall MD;  Location: [facility] OR     BLADDER SURGERY      Ibarra, [facility],Interstem stimulator implant     CLOSED REDUCTION HIP Left 6/10/2019    Procedure: Closed reduction  under general anesthesia left recurrent prosthetic hip dislocation;  Surgeon: Rafat Cazares MD;  Location: [facility] OR     COLONOSCOPY N/A 10/30/2015    Procedure: COMBINED COLONOSCOPY, SINGLE OR MULTIPLE BIOPSY/POLYPECTOMY BY BIOPSY;  Surgeon: Alexis Jackson MD;  Location: [facility] GI     COLONOSCOPY N/A 11/17/2016    Procedure: COMBINED COLONOSCOPY, SINGLE OR MULTIPLE BIOPSY/POLYPECTOMY BY BIOPSY;  Surgeon: Cat Huber MD;  Location: [facility] GI     ESOPHAGOSCOPY, GASTROSCOPY, DUODENOSCOPY (EGD), COMBINED N/A 11/17/2016    Procedure: COMBINED ESOPHAGOSCOPY, GASTROSCOPY, DUODENOSCOPY (EGD), BIOPSY SINGLE OR MULTIPLE;  Surgeon: Cat Huber MD;  Location: [facility] GI     EXAM UNDER ANESTHESIA, FULGURATE CONDYLOMA ANUS, COMBINED N/A 12/22/2016    Procedure: COMBINED EXAM UNDER ANESTHESIA, FULGURATE CONDYLOMA ANUS;  Surgeon: Nya Cabello MD;  Location: [facility] OR     Current Outpatient Medications   Medication Sig Dispense Refill     albuterol (VENTOLIN HFA) 108 (90 Base) MCG/ACT inhaler INHALE 2 PUFFS INTO LUNGS EVERY SIX HOURS AS NEEDED FOR SHORTNESS OF BREATH / DYSPNEA OR WHEEZING 1 Inhaler 11     ARIPiprazole (ABILIFY) 5 MG tablet Take 1.5 tablets (7.5 mg) by mouth every morning 45 tablet 1     betamethasone dipropionate (DIPROSONE) 0.05 % external ointment Apply topically 2 times daily Apply twice daily up to 2 weeks for rash.       Cholecalciferol (VITAMIN D3) 2000 units CAPS Take 1 capsule by mouth daily 90 capsule 3     FIBER LAXATIVE 0.52 g capsule TAKE 1 CAPSULE BY MOUTH DAILY 28 capsule 3     FLUoxetine (PROZAC) 20 MG capsule Take 60 mg by mouth daily        fluticasone (FLONASE) 50 MCG/ACT spray Spray 2 sprays into both nostrils every morning       hydrochlorothiazide (HYDRODIURIL) 12.5 MG tablet TAKE 1 TABLET (12.5MG) BY MOUTH DAILY 90 tablet 3     hydrocortisone 2.5 % cream Apply topically 2 times daily Apply sparing to face for 1-2 weeks. 30 g 2     ketoconazole (NIZORAL) 2 %  "external shampoo APPLY TO SCALP & BEARD TWICE WEEKLY  WASH OFF AFTER 5 MINUTES 120 mL 11     lidocaine (LIDODERM) 5 % patch Place 1 patch onto the skin every 24 hours To prevent lidocaine toxicity, patient should be patch free for 12 hrs daily. 30 patch 1     lisinopril (PRINIVIL/ZESTRIL) 10 MG tablet Take 1 tablet (10 mg) by mouth every morning 90 tablet 3     mometasone-formoterol (DULERA) 100-5 MCG/ACT inhaler Inhale 2 puffs into the lungs 2 times daily 1 Inhaler 11     order for DME Equipment being ordered: Lift Chair 1 each 0     oxybutynin ER (DITROPAN XL) 15 MG 24 hr tablet Take 30 mg by mouth daily       pantoprazole (PROTONIX) 40 MG EC tablet TAKE 1 TABLET BY MOUTH 30-60 MINUTES BEFORE FIRST MEAL OF THE DAY 90 tablet 3     prazosin (MINIPRESS) 1 MG capsule Take 3 mg by mouth At Bedtime       topiramate (TOPAMAX) 25 MG tablet Take 1 tablet (25 mg) by mouth daily 30 tablet 0     OTC products: no recent use of OTC ASA, NSAIDS or Steroids    Allergies   Allergen Reactions     No Clinical Screening - See Comments Other (See Comments)     Awoke from anesthesia with anger and ripping off dressing, after interstim placement, outside hospital 2013      Latex Allergy: NO    Social History     Tobacco Use     Smoking status: Former Smoker     Packs/day: 0.50     Years: 1.00     Pack years: 0.50     Types: Cigarettes     Smokeless tobacco: Never Used     Tobacco comment: Smokes E Cigs equal to 1 PPD   Substance Use Topics     Alcohol use: Yes     Comment: socially--\"not very often\" \"once a month\"     History   Drug Use No     Comment: patient denies any marijuana use       REVIEW OF SYSTEMS:   Constitutional, neuro, ENT, endocrine, pulmonary, cardiac, gastrointestinal, genitourinary, musculoskeletal, integument and psychiatric systems are negative, except as otherwise noted.    EXAM:   There were no vitals taken for this visit.    GENERAL APPEARANCE: healthy, alert and no distress     EYES: EOMI,  PERRL     HENT: ear " canals and TM's normal and nose and mouth without ulcers or lesions     NECK: no adenopathy, no asymmetry, masses, or scars and thyroid normal to palpation     RESP: lungs clear to auscultation - no rales, rhonchi or wheezes     CV: regular rates and rhythm, normal S1 S2, no S3 or S4 and no murmur, click or rub     ABDOMEN:  soft, nontender, no HSM or masses and bowel sounds normal     MS: extremities normal- no gross deformities noted, no evidence of inflammation in joints, FROM in all extremities.     SKIN: no suspicious lesions or rashes     NEURO: Normal strength and tone, sensory exam grossly normal, mentation intact and speech normal     PSYCH: mentation appears normal. and affect normal/bright     LYMPHATICS: No cervical adenopathy    DIAGNOSTICS:   No labs or EKG required for low risk surgery (cataract, skin procedure, breast biopsy, etc)    Recent Labs   Lab Test 08/27/19 2050 06/25/19  0639 06/24/19  1151  03/20/17  1117  01/09/17  0849  01/25/16  1541   HGB 13.9 11.3*  --    < > 12.7*   < > 12.9*   < > 14.8     --  245   < > 246   < > 244   < > 268   INR  --   --   --   --   --   --  0.92  --   --     140  --    < > 140  --  140   < > 140   POTASSIUM 3.8 4.2  --    < > 4.0  --  4.0   < > 4.4   CR 0.86 0.86 0.90   < > 0.97   < > 1.06   < > 0.75   A1C  --   --   --   --  4.6  --   --   --  4.9    < > = values in this interval not displayed.      (Z01.818) Preop general physical exam  (primary encounter diagnosis)  Comment:   Plan:     (N39.44) Nocturnal enuresis  Comment:   Plan:     (G47.33) LOREN (obstructive sleep apnea)  Comment:   Plan: wears cpap    (I10) Hypertension goal BP (blood pressure) < 140/90  Comment:   Plan: stable     (E66.01) Morbid obesity (H)  Comment:   Plan:     (J45.20) Mild intermittent asthma without complication  Comment:   Plan: stable       IMPRESSION:   Reason for surgery/procedure:  new interstim lead, replace old .  Diagnosis/reason for consult:  preoperative  evaluation for assessment of cardiovascular and respiratory disease as well as overall risk assessment and perioperative medical management.      The proposed surgical procedure is considered moderate risk.    REVISED CARDIAC RISK INDEX  The patient has the following serious cardiovascular risks for perioperative complications such as (MI, PE, VFib and 3  AV Block):  No serious cardiac risks  INTERPRETATION: 0 risks: Class I (very low risk - 0.4% complication rate)    The patient has the following additional risks for perioperative complications:  No identified additional risks      ICD-10-CM    1. Preop general physical exam Z01.818        RECOMMENDATIONS:     --Consult hospital rounder / IM to assist post-op medical management    --Patient is to take all scheduled medications on the day of surgery EXCEPT for modifications listed below.    ACE Inhibitor or Angiotensin Receptor Blocker (ARB) Use  Ace inhibitor or Angiotensin Receptor Blocker (ARB) and should HOLD this medication for the 24 hours prior to surgery.   dont take lisinopril     APPROVAL GIVEN to proceed with proposed procedure, without further diagnostic evaluation       Signed Electronically by: Ramona Ann Aaseby-Aguilera, PA-C    Copy of this evaluation report is provided to requesting physician.    [facility] Preop Guidelines    Revised Cardiac Risk Index

## 2019-10-28 NOTE — TELEPHONE ENCOUNTER
PT sessions completed: 10/01, 10/11, has future appts scheduled.       Chelsi AMADO, RN Care Coordinator  [facility]  Pain Replaced by [facility]

## 2019-10-29 ENCOUNTER — TRANSFERRED RECORDS (OUTPATIENT)
Dept: HEALTH INFORMATION MANAGEMENT | Facility: CLINIC | Age: 39
End: 2019-10-29

## 2019-10-29 ENCOUNTER — THERAPY VISIT (OUTPATIENT)
Dept: PHYSICAL THERAPY | Facility: CLINIC | Age: 39
End: 2019-10-29
Payer: COMMERCIAL

## 2019-10-29 DIAGNOSIS — M54.2 CERVICAL PAIN: ICD-10-CM

## 2019-10-29 DIAGNOSIS — M54.50 LUMBAR PAIN: ICD-10-CM

## 2019-10-29 PROCEDURE — 97110 THERAPEUTIC EXERCISES: CPT | Mod: GP | Performed by: PHYSICAL THERAPIST

## 2019-10-29 PROCEDURE — G0283 ELEC STIM OTHER THAN WOUND: HCPCS | Mod: GP | Performed by: PHYSICAL THERAPIST

## 2019-10-29 NOTE — PROGRESS NOTES
Subjective:  HPI                    Objective:  System    Physical Exam    General     ROS    Assessment/Plan:    SUBJECTIVE  Subjective changes as noted by pt:  Pt reports neck is feeling better , back feels sore     Current pain level: 2/10  Cervical 7/10 lumbar  Changes in function:  None     Adverse reaction to treatment or activity:  None    OBJECTIVE  Changes in objective findings:  Cervical AROM WNL and pain free. Pt notes increased pain with lumbar flexion. Lumbar extension remains limited but does note reproduce symptoms        ASSESSMENT  Kimo continues to require intervention to meet STG and LTG's: PT  Patient is progressing as expected.  Response to therapy has shown an improvement in  pain level cervical  Progress made towards STG/LTG?  Pt progressing towards goals    PLAN  Current treatment program is being advanced to more complex exercises.    PTA/ATC plan:  N/A    Please refer to the daily flowsheet for treatment today, total treatment time and time spent performing 1:1 timed codes.

## 2019-11-05 ENCOUNTER — THERAPY VISIT (OUTPATIENT)
Dept: CHIROPRACTIC MEDICINE | Facility: CLINIC | Age: 39
End: 2019-11-05
Payer: COMMERCIAL

## 2019-11-05 ENCOUNTER — THERAPY VISIT (OUTPATIENT)
Dept: PHYSICAL THERAPY | Facility: CLINIC | Age: 39
End: 2019-11-05
Payer: COMMERCIAL

## 2019-11-05 DIAGNOSIS — M99.02 THORACIC SEGMENT DYSFUNCTION: ICD-10-CM

## 2019-11-05 DIAGNOSIS — M53.3 SACRAL PAIN: ICD-10-CM

## 2019-11-05 DIAGNOSIS — M54.2 CERVICAL PAIN: ICD-10-CM

## 2019-11-05 DIAGNOSIS — M54.42 CHRONIC BILATERAL LOW BACK PAIN WITH LEFT-SIDED SCIATICA: ICD-10-CM

## 2019-11-05 DIAGNOSIS — M54.50 LUMBAR PAIN: ICD-10-CM

## 2019-11-05 DIAGNOSIS — G89.29 CHRONIC BILATERAL LOW BACK PAIN WITH LEFT-SIDED SCIATICA: ICD-10-CM

## 2019-11-05 DIAGNOSIS — M99.04 SOMATIC DYSFUNCTION OF SACRAL REGION: ICD-10-CM

## 2019-11-05 DIAGNOSIS — M99.03 SOMATIC DYSFUNCTION OF LUMBAR REGION: Primary | ICD-10-CM

## 2019-11-05 DIAGNOSIS — M25.552 HIP PAIN, LEFT: ICD-10-CM

## 2019-11-05 PROCEDURE — G0283 ELEC STIM OTHER THAN WOUND: HCPCS | Mod: GP | Performed by: PHYSICAL THERAPIST

## 2019-11-05 PROCEDURE — 98941 CHIROPRACT MANJ 3-4 REGIONS: CPT | Mod: AT | Performed by: CHIROPRACTOR

## 2019-11-05 PROCEDURE — 97110 THERAPEUTIC EXERCISES: CPT | Mod: GP | Performed by: PHYSICAL THERAPIST

## 2019-11-05 PROCEDURE — 97530 THERAPEUTIC ACTIVITIES: CPT | Mod: GP | Performed by: PHYSICAL THERAPIST

## 2019-11-05 PROCEDURE — 97810 ACUP 1/> WO ESTIM 1ST 15 MIN: CPT | Mod: GA | Performed by: CHIROPRACTOR

## 2019-11-05 NOTE — TELEPHONE ENCOUNTER
Called and spoke with Willard. He would not like to proceed with the MBB #2 at this time.  He is seeing the Chiropractor and doing Physical Therapy.  He states these are helping with his pain.  Advised Willard if down the road he would like to proceed with the RFA we would need to do the MBB #2 first. He states he understands instructions.      Routing to provider to review. Please sign encounter when complete.    Joselin Arora RN  Care Coordinator  [facility]

## 2019-11-08 NOTE — PROGRESS NOTES
Visit #:  2    Subjective:  Kimo Greenwood is a 38 year old male who is seen in f/u up for:        Somatic dysfunction of lumbar region  Chronic bilateral low back pain with left-sided sciatica  Somatic dysfunction of sacral region  Sacral pain  Thoracic segment dysfunction  Hip pain, left.     Since last visit on 10/10/2019,  Kimo Greenwood reports:    Area of chief complaint:  Thoracic and Lumbar :  Symptoms are graded at 4/10. The quality is described as stiff, achey, dull.  Motion has increased, but is still not normal. The pt reports at least 10% improvement in the low back and L hip area. He would like to try ACP today. He notes pain across the low back area. The pt denies weakness or other unusual sx. Patient feels that they are improved due to a reduction in symptoms.     Since last visit the patient feels that they are 10 percent  improved from last visit.       Objective:  The following was observed:    P: palpatory tenderness Lumbar erector spine and Quad lumb Bilaterally  A: static palpation demonstrates intersegmental asymmetry   R: motion palpation notes restricted motion  T: hypertonicity at: Lumbar erector spine and Quad lumb Bilaterally    Segmental spinal dysfunction/restrictions found at:  T5 , T9 , L5 , Sacrum  and PSIS Right       Assessment:    Diagnoses:      1. Somatic dysfunction of lumbar region    2. Chronic bilateral low back pain with left-sided sciatica    3. Somatic dysfunction of sacral region    4. Sacral pain    5. Thoracic segment dysfunction    6. Hip pain, left        Patient's condition:  Patient had restrictions pre-manipulation    Treatment effectiveness:  Post manipulation there is better intersegmental movement and Patient claims to feel looser post manipulation      Procedures:  CMT:  73028 Chiropractic manipulative treatment 3-4 regions performed   Thoracic: Diversified, T5, T9, Prone  Lumbar: Diversified, L5, Side posture  Pelvis: Drop Table, Sacrum , PSIS Right ,  Prone    Modalities:  67212: Acupuncture, for 15 minutes:  Points: Vivekatbamji Points in thoracic and lumbar spine  Ahsi point in hips and legs   For 15 minutes    Therapeutic procedures:  None    Response to Treatment  Reduction in symptoms as reported by patient    Prognosis: Guarded    Progress towards Goals: Patient is making progress towards the goal.  Goals:  SITTING: the patient specific goal is to attain pre-injury status of  8 hours comfortably  Walking 2 miles        Recommendations:    Instructions:  expect soreness    Follow-up:    Return to care in 1 week with ACP.

## 2019-11-12 ENCOUNTER — THERAPY VISIT (OUTPATIENT)
Dept: PHYSICAL THERAPY | Facility: CLINIC | Age: 39
End: 2019-11-12
Payer: COMMERCIAL

## 2019-11-12 DIAGNOSIS — M54.2 CERVICAL PAIN: ICD-10-CM

## 2019-11-12 DIAGNOSIS — M54.50 LUMBAR PAIN: ICD-10-CM

## 2019-11-12 PROCEDURE — G0283 ELEC STIM OTHER THAN WOUND: HCPCS | Mod: GP | Performed by: PHYSICAL THERAPIST

## 2019-11-12 PROCEDURE — 97110 THERAPEUTIC EXERCISES: CPT | Mod: GP | Performed by: PHYSICAL THERAPIST

## 2019-11-12 PROCEDURE — 97530 THERAPEUTIC ACTIVITIES: CPT | Mod: GP | Performed by: PHYSICAL THERAPIST

## 2019-11-12 NOTE — PROGRESS NOTES
Subjective:  HPI                    Objective:  System    Physical Exam    General     ROS    Assessment/Plan:    SUBJECTIVE  Subjective changes as noted by pt:  Pt sore from hunting this week end. Pt notes temporary relief from the pain with therapy     Current pain level: 8/10     Changes in function:  Pt notes increased standing and walking endurance     Adverse reaction to treatment or activity:  None    OBJECTIVE  Changes in objective findings:  Pt demonstrates increased strength latissimus dorsi and rhomboids. Limited thoracolumbar rotation. Pt instructed in stretch to improve thoracolumbar rotation        ASSESSMENT  Kimo continues to require intervention to meet STG and LTG's: PT  Patient is progressing as expected.  Response to therapy has shown an improvement in  function  Progress made towards STG/LTG?  Yes,     PLAN  Current treatment program is being advanced to more complex exercises.    PTA/ATC plan:  N/A    Please refer to the daily flowsheet for treatment today, total treatment time and time spent performing 1:1 timed codes.

## 2019-11-19 ENCOUNTER — THERAPY VISIT (OUTPATIENT)
Dept: PHYSICAL THERAPY | Facility: CLINIC | Age: 39
End: 2019-11-19
Payer: COMMERCIAL

## 2019-11-19 DIAGNOSIS — M54.2 CERVICAL PAIN: ICD-10-CM

## 2019-11-19 DIAGNOSIS — M54.50 LUMBAR PAIN: ICD-10-CM

## 2019-11-19 PROCEDURE — 97530 THERAPEUTIC ACTIVITIES: CPT | Mod: GP | Performed by: PHYSICAL THERAPIST

## 2019-11-19 PROCEDURE — 97110 THERAPEUTIC EXERCISES: CPT | Mod: GP | Performed by: PHYSICAL THERAPIST

## 2019-11-19 PROCEDURE — G0283 ELEC STIM OTHER THAN WOUND: HCPCS | Mod: GP | Performed by: PHYSICAL THERAPIST

## 2019-11-20 NOTE — PROGRESS NOTES
Subjective:  Incomplete             DISCHARGE REPORT     Progress reporting period is from 10-1-19 to 11-19-19.        SUBJECTIVE  Subjective changes noted by patient:  pt notes decreased pain and improved flexibility/strength in the back and neck. Pt feels ready to continue with home exercise program independently.       Current pain level is 6/10  .     Previous pain level was  8/10  .   Changes in function:  Pt notes increased ease with sleeping, sitting and standing. Pt notes difficulty with ambulation secondary to left knee pain  Adverse reaction to treatment or activity: None     OBJECTIVE  Changes noted in objective findings:  Yes, pt remains very tight in the hamstrings. Pt demonstrates improved strength abdominals and scapular stabilizers. Pt remains weak with pelvic stabilizers. Pt denies pain with palpation over the cervical and lumbar spine but notes pain with palpation of the cervical and lumbar paraspinals. Pt demonstrates improved mobility in cervical and lumbar spine.          ASSESSMENT/PLAN  Updated problem list and treatment plan: Diagnosis 1:  Cervical/lumbar pain  Pain -  hot/cold therapy, electric stimulation, self management, education and home program  Decreased ROM/flexibility - manual therapy, therapeutic exercise, therapeutic activity and home program  Decreased strength - therapeutic exercise, therapeutic activities and home program  STG/LTGs have been met or progress has been made towards goals:  Yes,   Assessment of Progress: The patient's condition is improving.  Self Management Plans:  Patient has been instructed in a home treatment program.  I have re-evaluated this patient and find that the nature, scope, duration and intensity of the therapy is appropriate for the medical condition of the patient.  Kimo continues to require the following intervention to meet STG and LTG's:  PT intervention is no longer required to meet STG/LTG.     Recommendations:  This patient is ready to be  discharged from therapy and continue their home treatment program.     Please refer to the daily flowsheet for treatment today, total treatment time and time spent performing 1:1 timed codes.

## 2019-11-22 ENCOUNTER — TELEPHONE (OUTPATIENT)
Dept: FAMILY MEDICINE | Facility: CLINIC | Age: 39
End: 2019-11-22

## 2019-11-22 NOTE — TELEPHONE ENCOUNTER
Patient's mother Belen gave us form through her [employer] account to complete for her employer for [employer] [facility] for Parent to take care adult son. Form is located in Dr. Hudson's folder at the [employer] to review and sign. Once completed please fax form to [phone], then mail form to Belen.   Fatou Pantoja

## 2019-12-03 ENCOUNTER — TRANSFERRED RECORDS (OUTPATIENT)
Dept: HEALTH INFORMATION MANAGEMENT | Facility: CLINIC | Age: 39
End: 2019-12-03

## 2019-12-03 ENCOUNTER — THERAPY VISIT (OUTPATIENT)
Dept: CHIROPRACTIC MEDICINE | Facility: CLINIC | Age: 39
End: 2019-12-03
Payer: COMMERCIAL

## 2019-12-03 DIAGNOSIS — M99.03 SOMATIC DYSFUNCTION OF LUMBAR REGION: Primary | ICD-10-CM

## 2019-12-03 DIAGNOSIS — M53.3 SACRAL PAIN: ICD-10-CM

## 2019-12-03 DIAGNOSIS — M99.02 THORACIC SEGMENT DYSFUNCTION: ICD-10-CM

## 2019-12-03 DIAGNOSIS — M54.42 CHRONIC BILATERAL LOW BACK PAIN WITH LEFT-SIDED SCIATICA: ICD-10-CM

## 2019-12-03 DIAGNOSIS — M99.04 SOMATIC DYSFUNCTION OF SACRAL REGION: ICD-10-CM

## 2019-12-03 DIAGNOSIS — G89.29 CHRONIC LOW BACK PAIN WITHOUT SCIATICA, UNSPECIFIED BACK PAIN LATERALITY: ICD-10-CM

## 2019-12-03 DIAGNOSIS — G89.29 CHRONIC BILATERAL LOW BACK PAIN WITH LEFT-SIDED SCIATICA: ICD-10-CM

## 2019-12-03 DIAGNOSIS — M54.50 CHRONIC LOW BACK PAIN WITHOUT SCIATICA, UNSPECIFIED BACK PAIN LATERALITY: ICD-10-CM

## 2019-12-03 PROCEDURE — 98941 CHIROPRACT MANJ 3-4 REGIONS: CPT | Mod: AT | Performed by: CHIROPRACTOR

## 2019-12-03 PROCEDURE — 97810 ACUP 1/> WO ESTIM 1ST 15 MIN: CPT | Mod: GA | Performed by: CHIROPRACTOR

## 2019-12-03 NOTE — PROGRESS NOTES
Visit #:  3    Subjective:  Kimo Greenwood is a 38 year old male who is seen in f/u up for:        Somatic dysfunction of lumbar region  Chronic low back pain without sciatica, unspecified back pain laterality  Chronic bilateral low back pain with left-sided sciatica  Sacral pain  Somatic dysfunction of sacral region  Thoracic segment dysfunction.     Since last visit,   Kimo Greenwood reports:    Area of chief complaint:  Thoracic and Lumbar :  Symptoms are graded at 4/10. The quality is described as stiff, achey, dull.  Motion has increased, but is still not normal. The pt reports at least 20-30% improvement in the low back and L hip area. He is walking much better and his pain levels have decreased. Today he notes pain across the low back area. The pt denies weakness or other unusual sx.     Since last visit the patient feels that they are 20-30 percent  improved from last visit.       Objective:  The following was observed:    P: palpatory tenderness Lumbar erector spine and Quad lumb Bilaterally  A: static palpation demonstrates intersegmental asymmetry   R: motion palpation notes restricted motion  T: hypertonicity at: Lumbar erector spine and Quad lumb Bilaterally    Segmental spinal dysfunction/restrictions found at:  T5 , T9 , L5 , Sacrum  and PSIS Right       Assessment:    Diagnoses:      1. Somatic dysfunction of lumbar region    2. Chronic low back pain without sciatica, unspecified back pain laterality    3. Chronic bilateral low back pain with left-sided sciatica    4. Sacral pain    5. Somatic dysfunction of sacral region    6. Thoracic segment dysfunction        Patient's condition:  Patient had restrictions pre-manipulation    Treatment effectiveness:  Post manipulation there is better intersegmental movement and Patient claims to feel looser post manipulation      Procedures:  CMT:  77334 Chiropractic manipulative treatment 3-4 regions performed   Thoracic: Diversified, T5, T9,  Prone  Lumbar: Diversified, L5, Side posture  Pelvis: Drop Table, Sacrum , PSIS Right , Prone    Modalities:  39009: Acupuncture, for 15 minutes:  Points: Elis Points in thoracic and lumbar spine  Ahsi point in hips and legs   For 15 minutes    Therapeutic procedures:  None    Response to Treatment  Reduction in symptoms as reported by patient    Prognosis: Guarded    Progress towards Goals: Patient is making progress towards the goal.  Goals:  SITTING: the patient specific goal is to attain pre-injury status of  8 hours comfortably  Walking 2 miles        Recommendations:    Instructions:  expect soreness    Follow-up:    Return to care in 1 week with ACP.

## 2019-12-11 ENCOUNTER — HOSPITAL ENCOUNTER (OUTPATIENT)
Dept: GENERAL RADIOLOGY | Facility: CLINIC | Age: 39
Discharge: HOME | End: 2019-12-11
Attending: ORTHOPAEDIC SURGERY | Admitting: ORTHOPAEDIC SURGERY
Payer: COMMERCIAL

## 2019-12-11 VITALS — HEART RATE: 83 BPM | DIASTOLIC BLOOD PRESSURE: 87 MMHG | SYSTOLIC BLOOD PRESSURE: 156 MMHG

## 2019-12-11 DIAGNOSIS — M25.50 JOINT PAIN: ICD-10-CM

## 2019-12-11 DIAGNOSIS — M16.9 HIP OSTEOARTHRITIS: ICD-10-CM

## 2019-12-11 PROCEDURE — 25000125 ZZHC RX 250

## 2019-12-11 PROCEDURE — 25500064 ZZH RX 255 OP 636: Performed by: PHYSICIAN ASSISTANT

## 2019-12-11 PROCEDURE — 25000128 H RX IP 250 OP 636

## 2019-12-11 PROCEDURE — 20610 DRAIN/INJ JOINT/BURSA W/O US: CPT | Mod: RT

## 2019-12-11 RX ORDER — TRIAMCINOLONE ACETONIDE 40 MG/ML
40 INJECTION, SUSPENSION INTRA-ARTICULAR; INTRAMUSCULAR ONCE
Status: COMPLETED | OUTPATIENT
Start: 2019-12-11 | End: 2019-12-11

## 2019-12-11 RX ORDER — BUPIVACAINE HYDROCHLORIDE 5 MG/ML
4 INJECTION, SOLUTION EPIDURAL; INTRACAUDAL ONCE
Status: COMPLETED | OUTPATIENT
Start: 2019-12-11 | End: 2019-12-11

## 2019-12-11 RX ORDER — TRIAMCINOLONE ACETONIDE 40 MG/ML
INJECTION, SUSPENSION INTRA-ARTICULAR; INTRAMUSCULAR
Status: COMPLETED
Start: 2019-12-11 | End: 2019-12-11

## 2019-12-11 RX ORDER — LIDOCAINE HYDROCHLORIDE 10 MG/ML
INJECTION, SOLUTION EPIDURAL; INFILTRATION; INTRACAUDAL; PERINEURAL
Status: COMPLETED
Start: 2019-12-11 | End: 2019-12-11

## 2019-12-11 RX ORDER — DEXTROSE MONOHYDRATE 25 G/50ML
25-50 INJECTION, SOLUTION INTRAVENOUS
Status: DISCONTINUED | OUTPATIENT
Start: 2019-12-11 | End: 2019-12-12 | Stop reason: HOSPADM

## 2019-12-11 RX ORDER — LIDOCAINE HYDROCHLORIDE 10 MG/ML
5 INJECTION, SOLUTION EPIDURAL; INFILTRATION; INTRACAUDAL; PERINEURAL ONCE
Status: COMPLETED | OUTPATIENT
Start: 2019-12-11 | End: 2019-12-11

## 2019-12-11 RX ORDER — NICOTINE POLACRILEX 4 MG
15-30 LOZENGE BUCCAL
Status: DISCONTINUED | OUTPATIENT
Start: 2019-12-11 | End: 2019-12-12 | Stop reason: HOSPADM

## 2019-12-11 RX ORDER — BUPIVACAINE HYDROCHLORIDE 5 MG/ML
INJECTION, SOLUTION EPIDURAL; INTRACAUDAL
Status: COMPLETED
Start: 2019-12-11 | End: 2019-12-11

## 2019-12-11 RX ORDER — IOPAMIDOL 408 MG/ML
10 INJECTION, SOLUTION INTRATHECAL ONCE
Status: COMPLETED | OUTPATIENT
Start: 2019-12-11 | End: 2019-12-11

## 2019-12-11 RX ADMIN — BUPIVACAINE HYDROCHLORIDE 20 MG: 5 INJECTION, SOLUTION EPIDURAL; INTRACAUDAL; PERINEURAL at 15:34

## 2019-12-11 RX ADMIN — TRIAMCINOLONE ACETONIDE 40 MG: 40 INJECTION, SUSPENSION INTRA-ARTICULAR; INTRAMUSCULAR at 15:34

## 2019-12-11 RX ADMIN — LIDOCAINE HYDROCHLORIDE 5 ML: 10 INJECTION, SOLUTION EPIDURAL; INFILTRATION; INTRACAUDAL; PERINEURAL at 15:34

## 2019-12-11 RX ADMIN — IOPAMIDOL 5 ML: 408 INJECTION, SOLUTION INTRATHECAL at 15:33

## 2019-12-11 RX ADMIN — BUPIVACAINE HYDROCHLORIDE 20 MG: 5 INJECTION, SOLUTION EPIDURAL; INTRACAUDAL at 15:34

## 2019-12-11 NOTE — PROGRESS NOTES
Pt was in Radiology today for a right hip steroid injection. Pt tolerated ortho procedure well. Pre procedure pain was 8 post procedure pain level 8.Procedure was completed by TERRANCE EDWARDS. There were no complications during this procedure. Pt verbalized understanding of written and verbal instructions and left department in stable and satisfactory condition with. There is no evidence of bleeding or any other complications upon discharge.

## 2019-12-11 NOTE — PROGRESS NOTES
RADIOLOGY PROCEDURE NOTE  Patient name: Kimo Greenwood  MRN: [id number]  : 1980    Pre-procedure diagnosis: Right hip pain  Post-procedure diagnosis: Same    Procedure Date/Time: 2019  3:41 PM  Procedure: Right hip steroid injection  Estimated blood loss: None  Specimen(s) collected with description: none  The patient tolerated the procedure well with no immediate complications.  Significant findings:none    See imaging dictation for procedural details.    Provider name: Alberto Rodriguez PA-C  Assistant(s):None

## 2019-12-23 DIAGNOSIS — I10 HYPERTENSION GOAL BP (BLOOD PRESSURE) < 140/90: ICD-10-CM

## 2019-12-23 DIAGNOSIS — K21.9 LPRD (LARYNGOPHARYNGEAL REFLUX DISEASE): ICD-10-CM

## 2019-12-23 DIAGNOSIS — E55.9 VITAMIN D DEFICIENCY: ICD-10-CM

## 2019-12-23 DIAGNOSIS — K29.80 DUODENITIS: ICD-10-CM

## 2019-12-23 RX ORDER — PANTOPRAZOLE SODIUM 40 MG/1
TABLET, DELAYED RELEASE ORAL
Qty: 28 TABLET | Refills: 5 | Status: SHIPPED | OUTPATIENT
Start: 2019-12-23 | End: 2020-06-16

## 2019-12-23 RX ORDER — LISINOPRIL 10 MG/1
TABLET ORAL
Qty: 90 TABLET | Refills: 1 | Status: SHIPPED | OUTPATIENT
Start: 2019-12-23 | End: 2020-06-16

## 2019-12-23 RX ORDER — MULTIVIT-MIN/IRON/FOLIC ACID/K 18-600-40
CAPSULE ORAL
Qty: 28 CAPSULE | Refills: 5 | Status: SHIPPED | OUTPATIENT
Start: 2019-12-23 | End: 2021-01-22

## 2019-12-23 NOTE — TELEPHONE ENCOUNTER
Routing refill request to provider for review/approval because:  Labs out of range:  BP  Anupama Ross RN, BSN

## 2019-12-24 DIAGNOSIS — Z79.899 ENCOUNTER FOR LONG-TERM (CURRENT) USE OF OTHER MEDICATIONS: Primary | ICD-10-CM

## 2019-12-24 LAB — HBA1C MFR BLD: 5.1 % (ref 0–5.6)

## 2019-12-24 PROCEDURE — 83036 HEMOGLOBIN GLYCOSYLATED A1C: CPT | Performed by: PHYSICIAN ASSISTANT

## 2019-12-24 PROCEDURE — 82947 ASSAY GLUCOSE BLOOD QUANT: CPT | Performed by: PHYSICIAN ASSISTANT

## 2019-12-24 PROCEDURE — 80061 LIPID PANEL: CPT | Performed by: PHYSICIAN ASSISTANT

## 2019-12-24 PROCEDURE — 36415 COLL VENOUS BLD VENIPUNCTURE: CPT | Performed by: PHYSICIAN ASSISTANT

## 2019-12-26 LAB
CHOLEST SERPL-MCNC: 184 MG/DL
GLUCOSE SERPL-MCNC: 78 MG/DL (ref 70–99)
HDLC SERPL-MCNC: 44 MG/DL
LDLC SERPL CALC-MCNC: 124 MG/DL
NONHDLC SERPL-MCNC: 140 MG/DL
TRIGL SERPL-MCNC: 82 MG/DL

## 2020-01-02 ENCOUNTER — THERAPY VISIT (OUTPATIENT)
Dept: CHIROPRACTIC MEDICINE | Facility: CLINIC | Age: 40
End: 2020-01-02
Payer: COMMERCIAL

## 2020-01-02 DIAGNOSIS — G89.29 CHRONIC BILATERAL LOW BACK PAIN WITH LEFT-SIDED SCIATICA: ICD-10-CM

## 2020-01-02 DIAGNOSIS — M99.03 SOMATIC DYSFUNCTION OF LUMBAR REGION: Primary | ICD-10-CM

## 2020-01-02 DIAGNOSIS — M25.552 HIP PAIN, LEFT: ICD-10-CM

## 2020-01-02 DIAGNOSIS — M99.02 THORACIC SEGMENT DYSFUNCTION: ICD-10-CM

## 2020-01-02 DIAGNOSIS — M99.04 SOMATIC DYSFUNCTION OF SACRAL REGION: ICD-10-CM

## 2020-01-02 DIAGNOSIS — M54.42 CHRONIC BILATERAL LOW BACK PAIN WITH LEFT-SIDED SCIATICA: ICD-10-CM

## 2020-01-02 PROCEDURE — 98941 CHIROPRACT MANJ 3-4 REGIONS: CPT | Mod: AT | Performed by: CHIROPRACTOR

## 2020-01-02 PROCEDURE — 97810 ACUP 1/> WO ESTIM 1ST 15 MIN: CPT | Mod: GA | Performed by: CHIROPRACTOR

## 2020-01-02 ASSESSMENT — MIFFLIN-ST. JEOR: SCORE: 2629.05

## 2020-01-02 NOTE — PROGRESS NOTES
Visit #:  4    Subjective:  Kimo Greenwood is a 38 year old male who is seen in f/u up for:        Somatic dysfunction of lumbar region  Chronic bilateral low back pain with left-sided sciatica  Somatic dysfunction of sacral region  Thoracic segment dysfunction  Hip pain, left.     Since last visit,   Kimo Greenwood reports:    Area of chief complaint:  Thoracic and Lumbar :  Symptoms are graded at 4/10. The quality is described as stiff, achey, dull.  Motion has increased, but is still not normal. The pt reports no significant change in the L hip area. The pt is having difficulty standing in addition with transitional movements. He states the weather increases his pain levels. The pt denies weakness or other unusual sx.     Since last visit the patient feels that they are 0-10 percent  improved from last visit.       Objective:  The following was observed:    P: palpatory tenderness Lumbar erector spine and Quad lumb Bilaterally  A: static palpation demonstrates intersegmental asymmetry   R: motion palpation notes restricted motion  T: hypertonicity at: Lumbar erector spine and Quad lumb Bilaterally    Segmental spinal dysfunction/restrictions found at:  T5 , T9 , L5 , Sacrum  and PSIS Right       Assessment:    Diagnoses:      1. Somatic dysfunction of lumbar region    2. Chronic bilateral low back pain with left-sided sciatica    3. Somatic dysfunction of sacral region    4. Thoracic segment dysfunction    5. Hip pain, left        Patient's condition:  Patient had restrictions pre-manipulation    Treatment effectiveness:  Post manipulation there is better intersegmental movement and Patient claims to feel looser post manipulation      Procedures:  CMT:  97243 Chiropractic manipulative treatment 3-4 regions performed   Thoracic: Diversified, T5, T9, Prone  Lumbar: Diversified, L5, Side posture  Pelvis: Drop Table, Sacrum , PSIS Right , Prone    Modalities:  28303: Acupuncture, for 15 minutes:  Points:  Elis Points in thoracic and lumbar spine  Ahsi point in hips and legs   For 15 minutes    Therapeutic procedures:  None    Response to Treatment  Reduction in symptoms as reported by patient    Prognosis: Guarded    Progress towards Goals: Patient is making progress towards the goal.  Goals:  SITTING: the patient specific goal is to attain pre-injury status of  8 hours comfortably  Walking 2 miles        Recommendations:    Instructions:  expect soreness    Follow-up:    Return to care in 1 week with ACP.

## 2020-01-03 ENCOUNTER — MYC MEDICAL ADVICE (OUTPATIENT)
Dept: FAMILY MEDICINE | Facility: CLINIC | Age: 40
End: 2020-01-03

## 2020-01-03 ENCOUNTER — OFFICE VISIT (OUTPATIENT)
Dept: FAMILY MEDICINE | Facility: CLINIC | Age: 40
End: 2020-01-03
Payer: COMMERCIAL

## 2020-01-03 VITALS
RESPIRATION RATE: 20 BRPM | HEART RATE: 72 BPM | DIASTOLIC BLOOD PRESSURE: 80 MMHG | SYSTOLIC BLOOD PRESSURE: 130 MMHG | HEIGHT: 71 IN | OXYGEN SATURATION: 96 % | TEMPERATURE: 98.6 F | BODY MASS INDEX: 44.1 KG/M2 | WEIGHT: 315 LBS

## 2020-01-03 DIAGNOSIS — Z01.818 PREOP GENERAL PHYSICAL EXAM: Primary | ICD-10-CM

## 2020-01-03 DIAGNOSIS — E66.01 MORBID OBESITY (H): ICD-10-CM

## 2020-01-03 DIAGNOSIS — I10 HYPERTENSION GOAL BP (BLOOD PRESSURE) < 140/90: ICD-10-CM

## 2020-01-03 DIAGNOSIS — N39.44 NOCTURNAL ENURESIS: ICD-10-CM

## 2020-01-03 DIAGNOSIS — J45.20 MILD INTERMITTENT ASTHMA WITHOUT COMPLICATION: ICD-10-CM

## 2020-01-03 DIAGNOSIS — G47.33 OSA (OBSTRUCTIVE SLEEP APNEA): ICD-10-CM

## 2020-01-03 PROCEDURE — 99215 OFFICE O/P EST HI 40 MIN: CPT | Performed by: FAMILY MEDICINE

## 2020-01-03 RX ORDER — COVID-19 ANTIGEN TEST
KIT MISCELLANEOUS
COMMUNITY
Start: 2018-08-24 | End: 2021-03-16

## 2020-01-03 RX ORDER — FLUOXETINE 40 MG/1
90 CAPSULE ORAL DAILY
COMMUNITY
Start: 2019-12-10 | End: 2020-10-26

## 2020-01-03 RX ORDER — ONDANSETRON 4 MG/1
TABLET, ORALLY DISINTEGRATING ORAL
COMMUNITY
Start: 2019-06-10 | End: 2021-09-01

## 2020-01-03 ASSESSMENT — MIFFLIN-ST. JEOR: SCORE: 2586.42

## 2020-01-03 NOTE — PROGRESS NOTES
[facility]  [id number] [place] [id number] [phone]  Dept: [phone]    PRE-OP EVALUATION:  Today's date: 1/3/2020    Kimo Greenwood (: 1980) presents for pre-operative evaluation assessment as requested by Dr. Tovar.  He requires evaluation and anesthesia risk assessment prior to undergoing surgery/procedure for treatment of inerstem  .    Fax number for surgical facility: [phone]  Primary Physician: Kory Hudson  Type of Anesthesia Anticipated: General    Patient has a Health Care Directive or Living Will:  NO    Preop Questions 1/3/2020   Who is doing your surgery? Dr Tovar   What are you having done? Pre op   Date of Surgery/Procedure: 2020   Facility or Hospital where procedure/surgery will be performed: [facility]   1.  Do you have a history of Heart attack, stroke, stent, coronary bypass surgery, or other heart surgery? No   2.  Do you ever have any pain or discomfort in your chest? No   3.  Do you have a history of  Heart Failure? No   4.   Are you troubled by shortness of breath when:  walking on a level surface, or up a slight hill, or at night? YES    5.  Do you currently have a cold, bronchitis or other respiratory infection? No   6.  Do you have a cough, shortness of breath, or wheezing? No   7.  Do you sometimes get pains in the calves of your legs when you walk? No   8. Do you or anyone in your family have previous history of blood clots? No   9.  Do you or does anyone in your family have a serious bleeding problem such as prolonged bleeding following surgeries or cuts? No   10. Have you ever had problems with anemia or been told to take iron pills? No   11. Have you had any abnormal blood loss such as black, tarry or bloody stools? No   12. Have you ever had a blood transfusion? No   13. Have you or any of your relatives ever had problems with anesthesia? No   14. Do you have sleep apnea, excessive snoring or daytime drowsiness? YES -  has CPAP.   15. Do you have any prosthetic heart valves? No   16. Do you have prosthetic joints? YES -status post total hip arthroplasty.         HPI:     HPI related to upcoming procedure:     Patient is a pleasant 39-year-old male who presents to the clinic for preoperative examination.  history of nocturnal enuresis, will be getting InterStim lead and battery replacement. PCP unavailable; first encounter with patient.     HYPERTENSION - Patient has longstanding history of HTN , currently denies any symptoms referable to elevated blood pressure. Specifically denies chest pain, racing heartbeat or shortness of breath. Blood pressure readings have been in normal range. Current medication regimen is as listed below. Patient denies any side effects of medication.     History of depression, intellectual delay, followed by psychiatry.    Reports he takes all medication at night and gets it via bubble pack.  He is not exactly sure what medications he is currently on.    MEDICAL HISTORY:     Patient Active Problem List    Diagnosis Date Noted     Somatic dysfunction of lumbar region 10/13/2019     Priority: Medium     Somatic dysfunction of sacral region 10/13/2019     Priority: Medium     Sacral pain 10/13/2019     Priority: Medium     Thoracic segment dysfunction 10/13/2019     Priority: Medium     Hip pain, left 10/13/2019     Priority: Medium     Lumbar pain 10/11/2019     Priority: Medium     Cervical pain 10/01/2019     Priority: Medium     Mild intermittent asthma 05/08/2017     Priority: Medium     Patel's esophagus determined by biopsy 03/27/2017     Priority: Medium     Next upper GI endoscopy (EGD) 11/2019       Vitamin D deficiency 03/20/2017     Priority: Medium     LPRD (laryngopharyngeal reflux disease) 03/20/2017     Priority: Medium     S/P total hip arthroplasty 01/25/2017     Priority: Medium     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     Priority: Medium     History of colonic polyps 09/28/2016      Priority: Medium     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 None 11/2016. Next colonoscopy 11/2021 if not sooner.       Bilateral low back pain with left-sided sciatica 10/08/2015     Priority: Medium     Chronic low back pain 10/06/2015     Priority: Medium     Suicidal ideation 08/23/2015     Priority: Medium     Leukocytosis 06/09/2014     Priority: Medium     Morbid obesity (H) 05/20/2014     Priority: Medium     Hypertension goal BP (blood pressure) < 140/90 02/03/2014     Priority: Medium     LOREN (obstructive sleep apnea) 04/23/2013     Priority: Medium     CPAP       Moderate major depression (H) 01/15/2013     Priority: Medium     Speech/language delay 11/11/2010     Priority: Medium     Tobacco use disorder 11/11/2010     Priority: Medium     Nocturnal enuresis      Priority: Medium      Past Medical History:   Diagnosis Date     Asthma      Chronic pain - left hip/leg pain      Hypertension      Marijuana abuse      MDD (major depressive disorder)      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    With associated speech/language delay     Obesity 11/11/2010     LOREN (obstructive sleep apnea)     Uses a CPAP     Seborrheic dermatitis      Past Surgical History:   Procedure Laterality Date     ARTHROPLASTY HIP Left 1/25/2017    Procedure: ARTHROPLASTY HIP;  Surgeon: Bala Randall MD;  Location:  OR     ARTHROPLASTY REVISION HIP Left 6/24/2019    Procedure: Revision left total hip arthroplasty with a head and liner exchange to a Biomet dual mobility head and liner.;  Surgeon: Bala Randall MD;  Location:  OR     BLADDER SURGERY      [facility], [facility],Interstem stimulator implant     CLOSED REDUCTION HIP Left 6/10/2019    Procedure: Closed reduction under general anesthesia left recurrent prosthetic hip dislocation;  Surgeon: Rafat Cazares MD;  Location: [facility] OR     COLONOSCOPY N/A 10/30/2015    Procedure: COMBINED COLONOSCOPY, SINGLE OR MULTIPLE  BIOPSY/POLYPECTOMY BY BIOPSY;  Surgeon: Alexis Jackson MD;  Location:  GI     COLONOSCOPY N/A 11/17/2016    Procedure: COMBINED COLONOSCOPY, SINGLE OR MULTIPLE BIOPSY/POLYPECTOMY BY BIOPSY;  Surgeon: Cat Huber MD;  Location:  GI     ESOPHAGOSCOPY, GASTROSCOPY, DUODENOSCOPY (EGD), COMBINED N/A 11/17/2016    Procedure: COMBINED ESOPHAGOSCOPY, GASTROSCOPY, DUODENOSCOPY (EGD), BIOPSY SINGLE OR MULTIPLE;  Surgeon: Cat Huber MD;  Location:  GI     EXAM UNDER ANESTHESIA, FULGURATE CONDYLOMA ANUS, COMBINED N/A 12/22/2016    Procedure: COMBINED EXAM UNDER ANESTHESIA, FULGURATE CONDYLOMA ANUS;  Surgeon: Nya Cabello MD;  Location:  OR     Current Outpatient Medications   Medication Sig Dispense Refill     albuterol (VENTOLIN HFA) 108 (90 Base) MCG/ACT inhaler INHALE 2 PUFFS INTO LUNGS EVERY SIX HOURS AS NEEDED FOR SHORTNESS OF BREATH / DYSPNEA OR WHEEZING 1 Inhaler 11     ARIPiprazole (ABILIFY) 5 MG tablet Take 1.5 tablets (7.5 mg) by mouth every morning 45 tablet 1     betamethasone dipropionate (DIPROSONE) 0.05 % external ointment Apply topically 2 times daily Apply twice daily up to 2 weeks for rash.       Cholecalciferol (VITAMIN D) 50 MCG (2000 UT) CAPS TAKE 1 CAPSULE BY MOUTH DAILY 28 capsule 5     FIBER LAXATIVE 0.52 g capsule TAKE 1 CAPSULE BY MOUTH DAILY 28 capsule 3     FLUoxetine (PROZAC) 20 MG capsule Take 60 mg by mouth daily        FLUoxetine (PROZAC) 40 MG capsule        hydrochlorothiazide (HYDRODIURIL) 12.5 MG tablet TAKE 1 TABLET (12.5MG) BY MOUTH DAILY 90 tablet 3     hydrocortisone 2.5 % cream Apply topically 2 times daily Apply sparing to face for 1-2 weeks. 30 g 2     ketoconazole (NIZORAL) 2 % external shampoo APPLY TO SCALP & BEARD TWICE WEEKLY  WASH OFF AFTER 5 MINUTES 120 mL 11     lidocaine (LIDODERM) 5 % patch Place 1 patch onto the skin every 24 hours To prevent lidocaine toxicity, patient should be patch free for 12 hrs daily. 30 patch 1  "    lisinopril (PRINIVIL/ZESTRIL) 10 MG tablet TAKE 1 TABLET BY MOUTH EVERY MORNING 90 tablet 1     mometasone-formoterol (DULERA) 100-5 MCG/ACT inhaler Inhale 2 puffs into the lungs 2 times daily 1 Inhaler 11     naproxen sodium 220 MG capsule If Tylenol is not helpful, this can be added 2 tabs twice a day with meals.       ondansetron (ZOFRAN-ODT) 4 MG ODT tab        oxybutynin ER (DITROPAN XL) 15 MG 24 hr tablet Take 30 mg by mouth daily       pantoprazole (PROTONIX) 40 MG EC tablet TAKE 1 TABLET BY MOUTH 30-60 MINUTES BEFORE FIRST MEAL OF THE DAY 28 tablet 5     prazosin (MINIPRESS) 1 MG capsule Take 3 mg by mouth At Bedtime       topiramate (TOPAMAX) 25 MG tablet Take 1 tablet (25 mg) by mouth daily 30 tablet 0     fluticasone (FLONASE) 50 MCG/ACT spray Spray 2 sprays into both nostrils every morning       OTC products: None, except as noted above    Allergies   Allergen Reactions     No Clinical Screening - See Comments Other (See Comments)     Awoke from anesthesia with anger and ripping off dressing, after interstim placement, outside hospital 2013      Latex Allergy: NO    Social History     Tobacco Use     Smoking status: Former Smoker     Packs/day: 0.50     Years: 1.00     Pack years: 0.50     Types: Cigarettes     Smokeless tobacco: Never Used     Tobacco comment: Smokes E Cigs equal to 1 PPD   Substance Use Topics     Alcohol use: Yes     Comment: socially--\"not very often\" \"once a month\"     History   Drug Use No     Comment: patient denies any marijuana use       REVIEW OF SYSTEMS:   Constitutional, neuro, ENT, endocrine, pulmonary, cardiac, gastrointestinal, genitourinary, musculoskeletal, integument and psychiatric systems are negative, except as otherwise noted.    EXAM:   /80 (BP Location: Right arm, Patient Position: Chair, Cuff Size: Adult Large)   Pulse 72   Temp 98.6  F (37  C) (Oral)   Resp 20   Ht 1.803 m (5' 11\")   Wt (!) 164.4 kg (362 lb 8 oz)   SpO2 96%   BMI 50.56 kg/m      " GENERAL APPEARANCE: Morbidly obese, alert and no distress     EYES: EOMI,  PERRL     HENT: ear canals and TM's normal and nose and mouth without ulcers or lesions     NECK: no adenopathy, no asymmetry, masses, or scars and thyroid normal to palpation     RESP: lungs clear to auscultation - no rales, rhonchi or wheezes     CV: regular rates and rhythm, normal S1 S2, no S3 or S4 and no murmur, click or rub     ABDOMEN: Easily reducible ventral abdominal hernia.  Soft, nontender, no HSM  and bowel sounds normal     MS: extremities normal- no gross deformities noted, no evidence of inflammation in joints, FROM in all extremities.     SKIN: no suspicious lesions or rashes     NEURO: Normal strength and tone, sensory exam grossly normal, mentation intact and speech normal     PSYCH: mentation appears normal. and affect normal/bright     LYMPHATICS: No cervical adenopathy    DIAGNOSTICS:   EKG: Not indicated due to non-vascular surgery and low risk of event (age <65 and without cardiac risk factors)    Recent Labs   Lab Test 12/24/19  1033 08/27/19  2050 06/25/19  0639 06/24/19  1151  03/20/17  1117  01/09/17  0849   HGB  --  13.9 11.3*  --    < > 12.7*   < > 12.9*   PLT  --  275  --  245   < > 246   < > 244   INR  --   --   --   --   --   --   --  0.92   NA  --  139 140  --    < > 140  --  140   POTASSIUM  --  3.8 4.2  --    < > 4.0  --  4.0   CR  --  0.86 0.86 0.90   < > 0.97   < > 1.06   A1C 5.1  --   --   --   --  4.6  --   --     < > = values in this interval not displayed.        IMPRESSION:   Reason for surgery/procedure: Bladder InterStim lead and battery replacement for nocturnal enuresis  Diagnosis/reason for consult: Preoperative evaluation.    The proposed surgical procedure is considered INTERMEDIATE risk.    REVISED CARDIAC RISK INDEX  The patient has the following serious cardiovascular risks for perioperative complications such as (MI, PE, VFib and 3  AV Block):  No serious cardiac risks  INTERPRETATION: 0  risks: Class I (very low risk - 0.4% complication rate)    The patient has the following additional risks for perioperative complications:  No identified additional risks  Morbid obesity      ICD-10-CM    1. Preop general physical exam Z01.818    2. Nocturnal enuresis N39.44    3. LOREN (obstructive sleep apnea) G47.33    4. Mild intermittent asthma without complication J45.20    5. Morbid obesity (H) E66.01    6. Hypertension goal BP (blood pressure) < 140/90 I10        RECOMMENDATIONS:     --Consult hospital rounder / IM to assist post-op medical management    Obstructive Sleep Apnea (or suspected sleep apnea)  Patient is to bring their home CPAP with them on the day of surgery    --Patient is to take all scheduled medications on the day of surgery EXCEPT for modifications listed below.    Anticoagulant or Antiplatelet Medication Use  NSAIDS: Naproxen (Naprosyn):   Stop 2-3 days prior to surgery to decrease risk of perioperative bleeding.      ACE Inhibitor:  Patient should continue this medication due to risk of uncontrolled blood pressure.    At risk for perioperative complications including poor wound healing and increased risk of infection due to morbid obesity.    APPROVAL GIVEN to proceed with proposed procedure, without further diagnostic evaluation       Signed Electronically by: Ciro Love MD    Copy of this evaluation report is provided to requesting physician.    [facility] Preop Guidelines    Revised Cardiac Risk Index

## 2020-01-07 ENCOUNTER — ANESTHESIA - HEALTHEAST (OUTPATIENT)
Dept: SURGERY | Facility: CLINIC | Age: 40
End: 2020-01-07

## 2020-01-08 ENCOUNTER — SURGERY - HEALTHEAST (OUTPATIENT)
Dept: SURGERY | Facility: CLINIC | Age: 40
End: 2020-01-08

## 2020-01-08 ASSESSMENT — MIFFLIN-ST. JEOR: SCORE: 2533.79

## 2020-01-23 DIAGNOSIS — K59.01 SLOW TRANSIT CONSTIPATION: ICD-10-CM

## 2020-01-23 NOTE — TELEPHONE ENCOUNTER
Prescription approved per [facility] Refill Protocol.  Eliana Concepcion RN

## 2020-01-25 PROBLEM — M54.2 CERVICAL PAIN: Status: RESOLVED | Noted: 2019-10-01 | Resolved: 2020-01-25

## 2020-01-25 PROBLEM — M54.50 LUMBAR PAIN: Status: RESOLVED | Noted: 2019-10-11 | Resolved: 2020-01-25

## 2020-01-25 NOTE — PROGRESS NOTES
Discharge Note    Progress reporting period is from initial evaluation date 10-11-19  to Nov 11-19-19, 2019.    Kimo failed to follow up and current status is unknown.  Please see information below for last relevant information on current status.  Patient seen for 3 visits.    SUBJECTIVE  Subjective changes noted by patient:     .  Current pain level is  .     Previous pain level was  8/10.   Changes in function:  Yes (See Goal flowsheet attached for changes in current functional level)  Adverse reaction to treatment or activity: None    OBJECTIVE  Changes noted in objective findings:       ASSESSMENT/PLAN  Diagnosis: lumbar pain   Updated problem list and treatment plan:   Pain - HEP  Decreased ROM/flexibility - HEP  Decreased strength - HEP  STG/LTGs have been met or progress has been made towards goals:  Yes, please see goal flowsheet for most current information  Assessment of Progress: current status is unknown.    Last current status:     Self Management Plans:  HEP  I have re-evaluated this patient and find that the nature, scope, duration and intensity of the therapy is appropriate for the medical condition of the patient.  Kimo continues to require the following intervention to meet STG and LTG's:  HEP.    Recommendations:  Discharge with current home program.  Patient to follow up with MD as needed.    Please refer to the daily flowsheet for treatment today, total treatment time and time spent performing 1:1 timed codes.

## 2020-02-04 ENCOUNTER — THERAPY VISIT (OUTPATIENT)
Dept: CHIROPRACTIC MEDICINE | Facility: CLINIC | Age: 40
End: 2020-02-04
Payer: COMMERCIAL

## 2020-02-04 DIAGNOSIS — M54.42 CHRONIC BILATERAL LOW BACK PAIN WITH LEFT-SIDED SCIATICA: ICD-10-CM

## 2020-02-04 DIAGNOSIS — G89.29 CHRONIC BILATERAL LOW BACK PAIN WITH LEFT-SIDED SCIATICA: ICD-10-CM

## 2020-02-04 DIAGNOSIS — M99.03 SOMATIC DYSFUNCTION OF LUMBAR REGION: Primary | ICD-10-CM

## 2020-02-04 DIAGNOSIS — M25.552 HIP PAIN, LEFT: ICD-10-CM

## 2020-02-04 DIAGNOSIS — M99.02 THORACIC SEGMENT DYSFUNCTION: ICD-10-CM

## 2020-02-04 DIAGNOSIS — M99.04 SOMATIC DYSFUNCTION OF SACRAL REGION: ICD-10-CM

## 2020-02-04 DIAGNOSIS — M53.3 SACRAL PAIN: ICD-10-CM

## 2020-02-04 PROCEDURE — 98941 CHIROPRACT MANJ 3-4 REGIONS: CPT | Mod: AT | Performed by: CHIROPRACTOR

## 2020-02-04 PROCEDURE — 97810 ACUP 1/> WO ESTIM 1ST 15 MIN: CPT | Mod: GA | Performed by: CHIROPRACTOR

## 2020-02-04 NOTE — PROGRESS NOTES
Visit #:  5    Subjective:  Kimo Greenwood is a 38 year old male who is seen in f/u up for:        Somatic dysfunction of lumbar region  Sacral pain  Somatic dysfunction of sacral region  Chronic bilateral low back pain with left-sided sciatica  Hip pain, left  Thoracic segment dysfunction.     Since last visit,   Kimo Greenwood reports:    Area of chief complaint:  Thoracic and Lumbar :  Symptoms are graded at 4/10. The quality is described as stiff, achey, dull.  Motion has increased, but is still not normal. The pt reports no significant change in the L hip area. The pt is having difficulty standing in addition with transitional movements. He states the weather increases his pain levels. He has not changed significantly in the previous treatments. He states he feels slightly sore after the ACP treatments.  The pt denies weakness or other unusual sx.     Since last visit the patient feels that they are 0-10 percent  improved from last visit.       Objective:  The following was observed:    P: palpatory tenderness Lumbar erector spine and Quad lumb Bilaterally  A: static palpation demonstrates intersegmental asymmetry   R: motion palpation notes restricted motion  T: hypertonicity at: Lumbar erector spine and Quad lumb Bilaterally    Segmental spinal dysfunction/restrictions found at:  T5 , T9 , L5 , Sacrum  and PSIS Right       Assessment:    Diagnoses:      1. Somatic dysfunction of lumbar region    2. Sacral pain    3. Somatic dysfunction of sacral region    4. Chronic bilateral low back pain with left-sided sciatica    5. Hip pain, left    6. Thoracic segment dysfunction        Patient's condition:  Patient had restrictions pre-manipulation    Treatment effectiveness:  Post manipulation there is better intersegmental movement and Patient claims to feel looser post manipulation      Procedures:  CMT:  57514 Chiropractic manipulative treatment 3-4 regions performed   Thoracic: Diversified, T5, T9,  Prone  Lumbar: Diversified, L5, Side posture  Pelvis: Drop Table, Sacrum , PSIS Right , Prone    Modalities:  60383: Acupuncture, for 15 minutes:  Points: Elis Points in thoracic and lumbar spine  Ahsi point in hips and legs   For 15 minutes    Therapeutic procedures:  None    Response to Treatment  Reduction in symptoms as reported by patient    Prognosis: Guarded    Progress towards Goals: Patient is making progress towards the goal.  Goals:  SITTING: the patient specific goal is to attain pre-injury status of  8 hours comfortably  Walking 2 miles        Recommendations:    Instructions:  expect soreness    Follow-up:    Return to care in 2 week with ACP.

## 2020-02-12 ENCOUNTER — MYC REFILL (OUTPATIENT)
Dept: FAMILY MEDICINE | Facility: CLINIC | Age: 40
End: 2020-02-12

## 2020-02-12 DIAGNOSIS — K59.01 SLOW TRANSIT CONSTIPATION: ICD-10-CM

## 2020-02-18 ENCOUNTER — TRANSFERRED RECORDS (OUTPATIENT)
Dept: HEALTH INFORMATION MANAGEMENT | Facility: CLINIC | Age: 40
End: 2020-02-18

## 2020-03-03 ENCOUNTER — OFFICE VISIT (OUTPATIENT)
Dept: FAMILY MEDICINE | Facility: CLINIC | Age: 40
End: 2020-03-03
Payer: COMMERCIAL

## 2020-03-03 ENCOUNTER — TELEPHONE (OUTPATIENT)
Dept: PALLIATIVE MEDICINE | Facility: CLINIC | Age: 40
End: 2020-03-03

## 2020-03-03 ENCOUNTER — MYC MEDICAL ADVICE (OUTPATIENT)
Dept: FAMILY MEDICINE | Facility: CLINIC | Age: 40
End: 2020-03-03

## 2020-03-03 VITALS
TEMPERATURE: 98.6 F | OXYGEN SATURATION: 95 % | SYSTOLIC BLOOD PRESSURE: 124 MMHG | RESPIRATION RATE: 20 BRPM | HEART RATE: 76 BPM | HEIGHT: 71 IN | DIASTOLIC BLOOD PRESSURE: 64 MMHG | WEIGHT: 315 LBS | BODY MASS INDEX: 44.1 KG/M2

## 2020-03-03 DIAGNOSIS — K22.70 BARRETT'S ESOPHAGUS DETERMINED BY BIOPSY: ICD-10-CM

## 2020-03-03 DIAGNOSIS — K29.80 DUODENITIS: ICD-10-CM

## 2020-03-03 DIAGNOSIS — G47.33 OSA (OBSTRUCTIVE SLEEP APNEA): ICD-10-CM

## 2020-03-03 DIAGNOSIS — E66.01 MORBID OBESITY (H): Primary | ICD-10-CM

## 2020-03-03 DIAGNOSIS — N50.89 TESTICLE LUMP: ICD-10-CM

## 2020-03-03 DIAGNOSIS — M16.11 PRIMARY OSTEOARTHRITIS OF RIGHT HIP: ICD-10-CM

## 2020-03-03 PROCEDURE — 99214 OFFICE O/P EST MOD 30 MIN: CPT | Performed by: FAMILY MEDICINE

## 2020-03-03 RX ORDER — TOPIRAMATE 25 MG/1
25 TABLET, FILM COATED ORAL 2 TIMES DAILY
Qty: 60 TABLET | Refills: 0 | Status: SHIPPED | OUTPATIENT
Start: 2020-03-03 | End: 2020-10-20

## 2020-03-03 ASSESSMENT — MIFFLIN-ST. JEOR: SCORE: 2611.37

## 2020-03-03 NOTE — TELEPHONE ENCOUNTER
AVINASH to schedule Procedure Order Joint Injection: Hip - right hip.    Breanne COURTNEY    [facility]

## 2020-03-03 NOTE — PROGRESS NOTES
Subjective     Kimo Greenwood is a 39 year old male who presents to clinic today for the following health issues:    HPI   Patient is here to talk about a testicular lump he noticed a few weeks ago. No pain and has not grown in size.     History of intermittent asthma. Well controlled. Denies cough, shortness of breath.      History of hypertension. Controlled with lisinopril and hydrochlorothiazide. Denies chest pain, heart palpitations, peripheral edema, shortness of breath, lightheadedness, or vision changes.  Had lower blood pressure outside of here but normal again today.     History of obstructive sleep apnea. Wearing the CPAP nightly.  Needs new tubing and mask.    History of overactive bladder with nocturnal enuresis.    History of chronic low back pain.    Patient status post revision left total hip arthroplasty with a head and liner exchange to a biomet duel mobility head and liner on 6/24/2019. Followed by [facility].     Having more right-sided hip pain.  They want him to lose weight prior to doing hip replacement which she is working on with increased activity and has lost some weight already.  He would like to get a repeat hip injection done as this helped previously.    History of moderate major depression. Patient is taking Abilify and Fluoxetine.  Patient is followed by a nurse practitioner at [facility].    Patient with history of GERD with inflammation noted on biopsies historically.  GI had recommended 3-year follow-up upper endoscopy to monitor for Patel esophagus.    Testicular lump noted about 3 to 4 weeks ago.  Denies any significant pain.    Patient Active Problem List   Diagnosis     Speech/language delay     Tobacco use disorder     Nocturnal enuresis     Moderate major depression (H)     LOREN (obstructive sleep apnea)     Hypertension goal BP (blood pressure) < 140/90     Morbid obesity (H)     Leukocytosis     Suicidal ideation     Chronic low back pain      Bilateral low back pain with left-sided sciatica     History of colonic polyps     Mild intellectual disability     S/P total hip arthroplasty     Vitamin D deficiency     LPRD (laryngopharyngeal reflux disease)     Patel's esophagus determined by biopsy     Mild intermittent asthma     Somatic dysfunction of lumbar region     Somatic dysfunction of sacral region     Sacral pain     Thoracic segment dysfunction     Hip pain, left     Past Surgical History:   Procedure Laterality Date     ARTHROPLASTY HIP Left 1/25/2017    Procedure: ARTHROPLASTY HIP;  Surgeon: Bala Randall MD;  Location: [facility] OR     ARTHROPLASTY REVISION HIP Left 6/24/2019    Procedure: Revision left total hip arthroplasty with a head and liner exchange to a Biomet dual mobility head and liner.;  Surgeon: Bala Randall MD;  Location: [facility] OR     BLADDER SURGERY      [facility], [facility],Interstem stimulator implant     CLOSED REDUCTION HIP Left 6/10/2019    Procedure: Closed reduction under general anesthesia left recurrent prosthetic hip dislocation;  Surgeon: Rafat Cazares MD;  Location:  OR     COLONOSCOPY N/A 10/30/2015    Procedure: COMBINED COLONOSCOPY, SINGLE OR MULTIPLE BIOPSY/POLYPECTOMY BY BIOPSY;  Surgeon: Alexis Jackson MD;  Location:  GI     COLONOSCOPY N/A 11/17/2016    Procedure: COMBINED COLONOSCOPY, SINGLE OR MULTIPLE BIOPSY/POLYPECTOMY BY BIOPSY;  Surgeon: Cat Huber MD;  Location: [facility]     ESOPHAGOSCOPY, GASTROSCOPY, DUODENOSCOPY (EGD), COMBINED N/A 11/17/2016    Procedure: COMBINED ESOPHAGOSCOPY, GASTROSCOPY, DUODENOSCOPY (EGD), BIOPSY SINGLE OR MULTIPLE;  Surgeon: Cat Huber MD;  [facility]     EXAM UNDER ANESTHESIA, FULGURATE CONDYLOMA ANUS, COMBINED N/A 12/22/2016    Procedure: COMBINED EXAM UNDER ANESTHESIA, FULGURATE CONDYLOMA ANUS;  Surgeon: Nya Cabello MD;  [facility] OR       Social History     Tobacco Use     Smoking status: Former  "Smoker     Packs/day: 0.50     Years: 1.00     Pack years: 0.50     Types: Cigarettes     Smokeless tobacco: Never Used     Tobacco comment: Smokes E Cigs equal to 1 PPD   Substance Use Topics     Alcohol use: Yes     Comment: socially--\"not very often\" \"once a month\"     Family History   Problem Relation Age of Onset     Anxiety Disorder Mother      Depression Mother      Ulcerative Colitis Mother 18     Breast Cancer Maternal Grandmother      Cerebrovascular Disease Maternal Grandmother      Breast Cancer Maternal Aunt      Cancer Maternal Grandfather         grt uncles colon cancer         Reviewed and updated as needed this visit by Provider  Tobacco  Allergies  Meds  Problems  Med Hx  Surg Hx  Fam Hx         Review of Systems   ROS COMP: RESP: No wheezing or shortness of breath  GI: No change in bowel habits  :   MUSCULOSKELETAL: Hip pains as noted above  PSYCHIATRIC: stable      Objective    /64 (BP Location: Right arm, Patient Position: Chair, Cuff Size: Adult Large)   Pulse 76   Temp 98.6  F (37  C)   Resp 20   Ht 1.803 m (5' 11\")   Wt (!) 166.9 kg (368 lb)   SpO2 95%   BMI 51.33 kg/m    Body mass index is 51.33 kg/m .  Physical Exam   GENERAL: alert, no distress and obese   (male): no hernias, penis normal without urethral discharge and Right side testicle lower pole with firm 3 mm bump on testicle            Assessment & Plan     1. Morbid obesity (H)  Discussed trial of medication below to see if this can assist in his weight loss efforts.  Recommend follow-up in 1 month for medication and weight recheck.  - topiramate (TOPAMAX) 25 MG tablet; Take 1 tablet (25 mg) by mouth 2 times daily  Dispense: 60 tablet; Refill: 0    2. LOREN (obstructive sleep apnea)  Continue CPAP.  - Sleep Order for DME - ONLY FOR DME    3. Duodenitis  Recommend follow-up upper endoscopy as previously recommended by GI.  - GASTROENTEROLOGY ADULT REF PROCEDURE ONLY Nakul Ordonez [phone]; [facility]    4. Patel's esophagus determined by biopsy  - GASTROENTEROLOGY ADULT REF PROCEDURE ONLY Nakul Ordonez [phone]; [facility]    5. Testicle lump  Recommend ultrasound.  - US Testicular and Scrotum; Future    6. Primary osteoarthritis of right hip  Set up 3 month repeat steroid injection.  - PAIN MANAGEMENT REFERRAL     BMI:   Estimated body mass index is 51.33 kg/m  as calculated from the following:    Height as of this encounter: 1.803 m (5' 11\").    Weight as of this encounter: 166.9 kg (368 lb).   Weight management plan: Discussed healthy diet and exercise guidelines    Return in about 1 month (around 4/3/2020) for medication recheck.    Kory Hudson MD  [facility]        "

## 2020-03-03 NOTE — PATIENT INSTRUCTIONS
Continue blood pressure medications  BP Readings from Last 1 Encounters:   03/03/20 124/64       Can try melatonin over the counter.  Try to avoid naps to sleep better at night  No screens in bedroom    Hip injection set up to help pain at night    New CPAP supplies    Upper endoscopy for watching Patel esophagus (per GI they wanted a 3 year follow-up)    Ultrasound of the testicle recommended    Topamax to help with weight loss - follow-up with me in 1 month for recheck

## 2020-03-04 ASSESSMENT — ASTHMA QUESTIONNAIRES: ACT_TOTALSCORE: 25

## 2020-03-05 NOTE — TELEPHONE ENCOUNTER
"Pre-screening Questions for Radiology Injections:    Injection to be done at which interventional clinic site? [facility]    Instruct patient to arrive as directed prior to the scheduled appointment time:    [place] minutes before      [place]: 30 minutes before; if IV needed 1 hour before     Dr. Byers-no IV needed for Cervical MARYJANE; please instruct to arrive 30\" early    Procedure ordered by Dr. Hudson    Procedure ordered? Procedure Order Joint Injection: Hip - right hip      Transforaminal Cervical MARYJANE - no pain provider currently performing    What insurance would patient like us to bill for this procedure? [employer]      Worker's comp or MVA (motor vehicle accident) -Any injection DO NOT SCHEDULE and route to Erin Harman.      [employer] insurance - For SI joint injections, DO NOT SCHEDULE and route Erin Hammer.       [employer] - Any injection besides hip/shoulder/knee joint DO NOT SCHEDULE and route to Erin Harman. She will obtain PA and call pt back to schedule procedure or notify pt of denial.       HP [employer]-Route to [place] for review      **BCBS- ALL need to be routed to [place] for review if a PA is needed**      IF SCHEDULING IN [place] AND NEEDS A PA, IT IS OKAY TO SCHEDULE. [place] HANDLES THEIR OWN PA'S AFTER THE PATIENT IS SCHEDULED. PLEASE SCHEDULE AT LEAST 1 WEEK OUT SO A PA CAN BE OBTAINED.    Any chance of pregnancy? Not Applicable   If YES, do NOT schedule and route to RN pool    Is an  needed? No     Patient has a drive home? (mandatory) YES: Informed    Is patient taking any blood thinners (i.e. plavix, coumadin, jantoven, warfarin, heparin, pradaxa or dabigatran, etc)? No   If hold needed, do NOT schedule, route to RN pool     Is patient taking any aspirin products (includes Excedrin and Fiorinal)? No     If more than 325mg/day, OK to schedule; Instruct pt to decrease to less than 325 mg for 7 days AND route to RN pool    For CERVICAL procedures, hold all aspirin " products for 6 days.     Tell pt that if aspirin product is not held for 6 days, the procedure WILL BE cancelled.      Does the patient have a bleeding or clotting disorder? No     If YES, okay to schedule AND route to RN nurse pool    For any patients with platelet count <100, must be forwarded to provider    Is patient diabetic?  No  If YES, instruct them to bring their glucometer.    Does patient have an active infection or treated for one within the past week? No     Is patient currently taking any antibiotics?  No     For patients on chronic, preventative, or prophylactic antibiotics, procedures may be scheduled.     For patients on antibiotics for active or recent infection:antibiotic course must have been completed for 4 days    Is patient currently taking any steroid medications? (i.e. Prednisone, Medrol)  No     For patients on steroid medications, course must have been completed for 4 days    Is patient actively being treated for cancer or immunocompromised? No  If YES, do NOT schedule and route to RN pool     Are you able to get on and off an exam table with minimal or no assistance? Yes  If NO, do NOT schedule and route to RN pool    Are you able to roll over and lay on your stomach with minimal or no assistance? Yes  If NO, do NOT schedule and route to RN pool     Any allergies to contrast dye, iodine, shellfish, or numbing and steroid medications? No  If YES, route to RN pool AND add allergy information to appointment notes    Allergies: No clinical screening - see comments      Has the patient had a flu shot or any other vaccinations within 7 days before or after the procedure.  No     Does patient have an MRI/CT?  Not Applicable  Check Procedure Scheduling Grid to see if required.      Was the MRI done within the last 3 years?  NA    If yes, where was the MRI done i.e.[facility], [facility], [place], [facility] etc?       If no, do not schedule and route to RN pool    If MRI was not done at  [facility], [facility] or [facility] do NOT schedule and route to RN pool.      If pt has an imaging disc, the injection MAY be scheduled but pt has to bring disc to appt.     If they show up without the disc the injection cannot be done    Procedure Specific Instructions:      If celiac plexus block, informed patient NPO for 6 hours and that it is okay to take medications with sips of water, especially blood pressure medications  Not Applicable         If this is for a cervical procedure, informed patient that aspirin needs to be held for 6 days.   Not Applicable      If IV needed:    Do not schedule procedures requiring IV placement in the first appointment of the day or first appointment after lunch. Do NOT schedule at 0745, 0815 or 1245.     Instructed pt to arrive 30 minutes early for IV start if required. (Check Procedure Scheduling Grid)  Not Applicable    Reminders:      If you are started on any steroids or antibiotics between now and your appointment, you must contact us because the procedure may need to be cancelled.  Yes      For all procedures except radiofrequency ablations (RFAs) and spinal cord stimulator (SCS) trials, informed patient:    IV sedation is not provided for this procedure.  If you feel that an oral anti-anxiety medication is needed, you can discuss this further with your referring provider or primary care provider.  The Pain Clinic provider will discuss specifics of what the procedure includes at your appointment.  Most procedures last 10-20 minutes.  We use numbing medications to help with any discomfort during the procedure.  Not Applicable      For patients 85 or older we recommend having an adult stay w/ them for the remainder of the day.       Does the patient have any questions?  NO  Breanne Craig  [facility]

## 2020-03-09 ENCOUNTER — RADIOLOGY INJECTION OFFICE VISIT (OUTPATIENT)
Dept: PALLIATIVE MEDICINE | Facility: CLINIC | Age: 40
End: 2020-03-09
Payer: COMMERCIAL

## 2020-03-09 ENCOUNTER — ANCILLARY PROCEDURE (OUTPATIENT)
Dept: GENERAL RADIOLOGY | Facility: CLINIC | Age: 40
End: 2020-03-09
Attending: PHYSICAL MEDICINE & REHABILITATION
Payer: COMMERCIAL

## 2020-03-09 VITALS — OXYGEN SATURATION: 99 % | DIASTOLIC BLOOD PRESSURE: 72 MMHG | SYSTOLIC BLOOD PRESSURE: 126 MMHG | HEART RATE: 75 BPM

## 2020-03-09 DIAGNOSIS — M25.551 HIP PAIN, RIGHT: ICD-10-CM

## 2020-03-09 DIAGNOSIS — M25.551 HIP PAIN, RIGHT: Primary | ICD-10-CM

## 2020-03-09 PROCEDURE — 20610 DRAIN/INJ JOINT/BURSA W/O US: CPT | Mod: RT | Performed by: PHYSICAL MEDICINE & REHABILITATION

## 2020-03-09 PROCEDURE — 77002 NEEDLE LOCALIZATION BY XRAY: CPT | Mod: 26 | Performed by: PHYSICAL MEDICINE & REHABILITATION

## 2020-03-09 NOTE — NURSING NOTE
Discharge Information    IV Discontiued Time:  NA    Amount of Fluid Infused:  NA    Discharge Criteria = When patient returns to baseline or as per MD order    Consciousness:  Pt is fully awake    Circulation:  BP +/- 20% of pre-procedure level    Respiration:  Patient is able to breathe deeply    O2 Sat:  Patient is able to maintain O2 Sat >92% on room air    Activity:  Moves 4 extremities on command    Ambulation:  Patient is able to stand and walk or stand and pivot into wheelchair    Dressing:  Clean/dry or No Dressing    Notes:   Discharge instructions and AVS given to patient    Patient meets criteria for discharge?  YES    Admitted to PCU?  No    Responsible adult present to accompany patient home?  Yes    Signature/Title:    Chelsi Kitchen RN  RN Care Coordinator  [facility]

## 2020-03-09 NOTE — PATIENT INSTRUCTIONS
[facility] Procedure Discharge Instructions    Today you saw:   Dr. Jam Byers    Your procedure:   Hip injection       Medications used:  Lidocaine (anesthetic)  Bupivacaine (anesthetic) Kenalog (steroid)  Omnipaque (contrast)                 Be cautious when walking as numbness and/or weakness in the legs may occur up to 6-8 hours after the procedure due to effect of the local anesthetic    Do not drive for 6 hours. The effect of the local anesthetic could slow your reflexes.     Avoid strenuous activity for the first 24 hours. You may resume your regular activities after that.     You may shower, however avoid swimming, tub baths or hot tubs for 24 hours following your procedure    You may have a mild to moderate increase in pain for several days following the injection.      You may use ice packs for 10-15 minutes, 3 to 4 times a day at the injection site for comfort    Do not use heat to painful areas for 6 to 8 hours. This will give the local anesthetic time to wear off and prevent you from accidentally burning your skin.    Unless you have been directed to avoid the use of anti-inflammatory medications (NSAIDS-ibuprofen, Aleve, Motrin), you may use these medications or Tylenol for pain control if needed.     With diabetes, check your blood sugar more frequently than usual as your blood sugar may be higher than normal for 10-14 days following a steroid injection. Contact your doctor who manages your diabetes if your blood sugar is higher than usual    Possible side effects of steroids that you may experience include flushing, elevated blood pressure, increased appetite, mild headaches and restlessness.  All of these symptoms will get better with time.    It may take up to 14 days for the steroid medication to start working although you may feel the effect as early as a few days after the procedure.     Follow up with your referring provider in 2-3 weeks      If you experience any of  the following, call the pain center line during work hours at [phone] or on-call physician after hours at [phone]:  -Fever over 100 degree F  -Swelling, bleeding, redness, drainage, warmth at the injection site  -Progressive weakness or numbness in your legs   -Loss of bowel or bladder function  -Unusual new onset of pain that is not improving

## 2020-03-09 NOTE — NURSING NOTE
Pre-procedure Intake    Have you been fasting? No    If yes, for how long?     Are you taking a prescribed blood thinner such as coumadin, Plavix, Xarelto?    No    If yes, when did you take your last dose?     Do you take aspirin?  No    If cervical procedure, have you held aspirin for 6 days?   NA    Do you have any allergies to contrast dye, iodine, steroid and/or numbing medications?  NO    Are you currently taking antibiotics or have an active infection?  NO    Have you had a fever/elevated temperature within the past week? NO    Are you currently taking oral steroids? NO    Do you have a ? Yes       Are you pregnant or breastfeeding?  NO    Are the vital signs normal?  Yes

## 2020-03-09 NOTE — PROGRESS NOTES
[facility] - Procedure Note    Date of Service: 3/9/2020    Pre procedure Diagnosis: hip arthropathy   Post procedure Diagnosis: Same  Procedure performed: Right intraarticular hip injection  Anesthesia: none  Operators: Jam Byers DO     Indications:   Kimo ABE Greenwood is a 39 year old male was sent by Dr. shay for a right hip joint injection.  They have a history of hip pain and arthropathy. The patient describes pain in the right groin/hip with ambulation, pain in the right thigh at rest. They have tried conservative treatment including exercises and oral medications.      Allergies:      Allergies   Allergen Reactions     No Clinical Screening - See Comments Other (See Comments)     Awoke from anesthesia with anger and ripping off dressing, after interstim placement, outside hospital 2013        Vitals:  /72   Pulse 75   SpO2 99%     Medications were reviewed.    Procedure:  Options/alternatives, benefits and risks were discussed with the patient. Risks include but are not limited to: infection, bleeding, increased pain, and damage to soft tissue, nerve, muscle, and vasculature structures. After getting informed consent, patient was brought into the procedure suite and was placed in a prone position on the procedure table.   A Pause for the Cause was performed.  Patient was prepped and draped in sterile fashion.   The hip joint was identified using AP fluoroscopy. Care was taken to locate the femoral pulse. A 25 gauge 5 inch spinal needle was advanced under intermittent fluoroscopy until it was felt to enter the hip joint   A total of 1 mL of Omnipaque-300 was injected, showing appropriate location, with spread into the intraarticular space and no intravascular uptake noted. 9 mL of contrast was wasted.  A mixture containing, 2 mL of 0.5% bupivacaine and  40 mg of triamcinolone was injected. The needle was removed. Hemostasis was achieved, the area was cleaned,  and bandaids were placed when appropriate. Images were saved to PACS.  The patient tolerated the procedure well, and was taken to the recovery room, and there was no evidence of procedural complications. No new sensory or motor deficits were noted following the procedure. The patient was stable and able to ambulate on discharge home. Post-procedure instructions were provided.     Pre-procedure pain score: 8/10  Post-procedure pain score: 6/10    Assessment/Plan: Kimo Greenwood is a 39 year old male s/p right hip joint injection today for hip pain and arthropathy.     1. Following today's procedure, the patient was advised to contact the [facility] for any of the following:   Fever, chills, or night sweats   New onset of pain, numbness, or weakness   Any questions/concerns regarding the procedure  If unable to contact the Pain Center, the patient was instructed to go to a local Emergency Room for any complications.   2. The patient should follow-up with the referring provider for post-procedure evaluation.        Jam Byers DO  [facility]

## 2020-03-10 ENCOUNTER — OFFICE VISIT (OUTPATIENT)
Dept: FAMILY MEDICINE | Facility: CLINIC | Age: 40
End: 2020-03-10
Payer: COMMERCIAL

## 2020-03-10 VITALS
RESPIRATION RATE: 20 BRPM | WEIGHT: 315 LBS | OXYGEN SATURATION: 97 % | TEMPERATURE: 98.9 F | HEART RATE: 74 BPM | BODY MASS INDEX: 44.1 KG/M2 | HEIGHT: 71 IN | DIASTOLIC BLOOD PRESSURE: 70 MMHG | SYSTOLIC BLOOD PRESSURE: 128 MMHG

## 2020-03-10 DIAGNOSIS — E66.01 MORBID OBESITY (H): ICD-10-CM

## 2020-03-10 DIAGNOSIS — K22.70 BARRETT'S ESOPHAGUS DETERMINED BY BIOPSY: ICD-10-CM

## 2020-03-10 DIAGNOSIS — Z01.818 PREOP GENERAL PHYSICAL EXAM: Primary | ICD-10-CM

## 2020-03-10 DIAGNOSIS — I10 HYPERTENSION GOAL BP (BLOOD PRESSURE) < 140/90: ICD-10-CM

## 2020-03-10 PROCEDURE — 99215 OFFICE O/P EST HI 40 MIN: CPT | Performed by: FAMILY MEDICINE

## 2020-03-10 ASSESSMENT — MIFFLIN-ST. JEOR: SCORE: 2592.32

## 2020-03-10 NOTE — PROGRESS NOTES
[facility]  [id number] [place] [id number] [phone]  Dept: [phone]    PRE-OP EVALUATION:  Today's date: 3/10/2020    Kimo Greenwood (: 1980) presents for pre-operative evaluation assessment as requested by Dr. Kraus.  He requires evaluation and anesthesia risk assessment prior to undergoing surgery/procedure for treatment of endoscopy .    Fax number for surgical facility:   Primary Physician: Kory Hudson  Type of Anesthesia Anticipated: Local    Patient has a Health Care Directive or Living Will:  NO    Preop Questions 3/10/2020   Who is doing your surgery? -[facility]   What are you having done? endos   Date of Surgery/Procedure: -3/12/2020   Facility or Hospital where procedure/surgery will be performed: -[facility]   1.  Do you have a history of Heart attack, stroke, stent, coronary bypass surgery, or other heart surgery? -no   2.  Do you ever have any pain or discomfort in your chest? -no   3.  Do you have a history of  Heart Failure? -no   4.   Are you troubled by shortness of breath when:  walking on a level surface, or up a slight hill, or at night? -no   5.  Do you currently have a cold, bronchitis or other respiratory infection? -no   6.  Do you have a cough, shortness of breath, or wheezing? -no   7.  Do you sometimes get pains in the calves of your legs when you walk? -no   8. Do you or anyone in your family have previous history of blood clots? -no   9.  Do you or does anyone in your family have a serious bleeding problem such as prolonged bleeding following surgeries or cuts? -no   10. Have you ever had problems with anemia or been told to take iron pills? -no   11. Have you had any abnormal blood loss such as black, tarry or bloody stools? -no   12. Have you ever had a blood transfusion? -no   13. Have you or any of your relatives ever had problems with anesthesia? -no   14. Do you have sleep apnea, excessive snoring or daytime drowsiness?  -yes; has CPAP   15. Do you have any prosthetic heart valves? -no   16. Do you have prosthetic joints? -yes; status post total hip arthroplasty.       HPI:     HPI related to upcoming procedure:     Patient is pleasant 39-year-old male presents clinic for preoperative examination, history of Patel's esophagus, biopsy confirmed; he is to have repeat endoscopy.    History of morbid obesity, BMI 51.    HYPERTENSION - Patient has longstanding history of HTN , currently denies any symptoms referable to elevated blood pressure. Specifically denies chest pain, racing heartbeat or shortness of breath. Blood pressure readings have been in normal range. Current medication regimen is as listed below. Patient denies any side effects of medication.     Has self discontinued naproxen and states that he knows not to take this medication or any other NSAIDs to decrease risk of bleeding, recently had steroid injection for hip pain.    See problem list for active medical problems.  Problems all longstanding and stable, except as noted/documented.  See ROS for pertinent symptoms related to these conditions.    MEDICAL HISTORY:     Patient Active Problem List    Diagnosis Date Noted     Somatic dysfunction of lumbar region 10/13/2019     Priority: Medium     Somatic dysfunction of sacral region 10/13/2019     Priority: Medium     Sacral pain 10/13/2019     Priority: Medium     Thoracic segment dysfunction 10/13/2019     Priority: Medium     Hip pain, left 10/13/2019     Priority: Medium     Mild intermittent asthma 05/08/2017     Priority: Medium     Patel's esophagus determined by biopsy 03/27/2017     Priority: Medium     Next upper GI endoscopy (EGD) 11/2019       Vitamin D deficiency 03/20/2017     Priority: Medium     LPRD (laryngopharyngeal reflux disease) 03/20/2017     Priority: Medium     S/P total hip arthroplasty 01/25/2017     Priority: Medium     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     Priority: Medium     History  of colonic polyps 09/28/2016     Priority: Medium     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 None 11/2016. Next colonoscopy 11/2021 if not sooner.       Bilateral low back pain with left-sided sciatica 10/08/2015     Priority: Medium     Chronic low back pain 10/06/2015     Priority: Medium     Suicidal ideation 08/23/2015     Priority: Medium     Leukocytosis 06/09/2014     Priority: Medium     Morbid obesity (H) 05/20/2014     Priority: Medium     Hypertension goal BP (blood pressure) < 140/90 02/03/2014     Priority: Medium     LOREN (obstructive sleep apnea) 04/23/2013     Priority: Medium     CPAP       Moderate major depression (H) 01/15/2013     Priority: Medium     Speech/language delay 11/11/2010     Priority: Medium     Tobacco use disorder 11/11/2010     Priority: Medium     Nocturnal enuresis      Priority: Medium      Past Medical History:   Diagnosis Date     Arthritis     DDD hips and knees     Asthma      Chronic pain - left hip/leg pain     degenerative disc disease, back, hips, knees     Gastroesophageal reflux disease      Hypertension      Marijuana abuse      MDD (major depressive disorder)      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    With associated speech/language delay     Obesity 11/11/2010     LOREN (obstructive sleep apnea)     Uses a CPAP     Seborrheic dermatitis      Past Surgical History:   Procedure Laterality Date     ARTHROPLASTY HIP Left 1/25/2017    Procedure: ARTHROPLASTY HIP;  Surgeon: Bala Randall MD;  Location: [facility] OR     ARTHROPLASTY REVISION HIP Left 6/24/2019    Procedure: Revision left total hip arthroplasty with a head and liner exchange to a Biomet dual mobility head and liner.;  Surgeon: Bala Randall MD;  Location: [facility] OR     BLADDER SURGERY      Ibarra, [facility],Interstem stimulator implant     CLOSED REDUCTION HIP Left 6/10/2019    Procedure: Closed reduction under general anesthesia left recurrent prosthetic hip  dislocation;  Surgeon: Rafat Cazares MD;  Location: [facility]     COLONOSCOPY N/A 10/30/2015    Procedure: COMBINED COLONOSCOPY, SINGLE OR MULTIPLE BIOPSY/POLYPECTOMY BY BIOPSY;  Surgeon: Alexis Jackson MD;  Location: [facility]     COLONOSCOPY N/A 11/17/2016    Procedure: COMBINED COLONOSCOPY, SINGLE OR MULTIPLE BIOPSY/POLYPECTOMY BY BIOPSY;  Surgeon: Cat Huber MD;  Location: [facility]     ESOPHAGOSCOPY, GASTROSCOPY, DUODENOSCOPY (EGD), COMBINED N/A 11/17/2016    Procedure: COMBINED ESOPHAGOSCOPY, GASTROSCOPY, DUODENOSCOPY (EGD), BIOPSY SINGLE OR MULTIPLE;  Surgeon: Cat Huber MD;  Location: [facility]     EXAM UNDER ANESTHESIA, FULGURATE CONDYLOMA ANUS, COMBINED N/A 12/22/2016    Procedure: COMBINED EXAM UNDER ANESTHESIA, FULGURATE CONDYLOMA ANUS;  Surgeon: Nya Cabello MD;  Location: [facility]     GENITOURINARY SURGERY       Current Outpatient Medications   Medication Sig Dispense Refill     albuterol (VENTOLIN HFA) 108 (90 Base) MCG/ACT inhaler INHALE 2 PUFFS INTO LUNGS EVERY SIX HOURS AS NEEDED FOR SHORTNESS OF BREATH / DYSPNEA OR WHEEZING 1 Inhaler 11     ARIPiprazole (ABILIFY) 5 MG tablet Take 1.5 tablets (7.5 mg) by mouth every morning 45 tablet 1     betamethasone dipropionate (DIPROSONE) 0.05 % external ointment Apply topically 2 times daily Apply twice daily up to 2 weeks for rash.       Cholecalciferol (VITAMIN D) 50 MCG (2000 UT) CAPS TAKE 1 CAPSULE BY MOUTH DAILY 28 capsule 5     FLUoxetine (PROZAC) 20 MG capsule Take 60 mg by mouth daily        FLUoxetine (PROZAC) 40 MG capsule        fluticasone (FLONASE) 50 MCG/ACT spray Spray 2 sprays into both nostrils every morning       hydrochlorothiazide (HYDRODIURIL) 12.5 MG tablet TAKE 1 TABLET (12.5MG) BY MOUTH DAILY 90 tablet 3     hydrocortisone 2.5 % cream Apply topically 2 times daily Apply sparing to face for 1-2 weeks. 30 g 2     ketoconazole (NIZORAL) 2 % external shampoo APPLY TO SCALP & BEARD TWICE WEEKLY  WASH OFF  "AFTER 5 MINUTES 120 mL 11     lidocaine (LIDODERM) 5 % patch Place 1 patch onto the skin every 24 hours To prevent lidocaine toxicity, patient should be patch free for 12 hrs daily. 30 patch 1     lisinopril (PRINIVIL/ZESTRIL) 10 MG tablet TAKE 1 TABLET BY MOUTH EVERY MORNING 90 tablet 1     mometasone-formoterol (DULERA) 100-5 MCG/ACT inhaler Inhale 2 puffs into the lungs 2 times daily 1 Inhaler 11     naproxen sodium 220 MG capsule If Tylenol is not helpful, this can be added 2 tabs twice a day with meals.       ondansetron (ZOFRAN-ODT) 4 MG ODT tab        oxybutynin ER (DITROPAN XL) 15 MG 24 hr tablet Take 30 mg by mouth daily       pantoprazole (PROTONIX) 40 MG EC tablet TAKE 1 TABLET BY MOUTH 30-60 MINUTES BEFORE FIRST MEAL OF THE DAY 28 tablet 5     prazosin (MINIPRESS) 1 MG capsule Take 3 mg by mouth At Bedtime       psyllium (FIBER LAXATIVE) 0.52 g capsule Take 3 capsules by mouth daily 90 capsule 11     topiramate (TOPAMAX) 25 MG tablet Take 1 tablet (25 mg) by mouth 2 times daily 60 tablet 0     OTC products: None, except as noted above    Allergies   Allergen Reactions     No Clinical Screening - See Comments Other (See Comments)     Awoke from anesthesia with anger and ripping off dressing, after interstim placement, outside hospital 2013      Latex Allergy: NO    Social History     Tobacco Use     Smoking status: Former Smoker     Packs/day: 0.50     Years: 1.00     Pack years: 0.50     Types: Cigarettes     Smokeless tobacco: Never Used     Tobacco comment: Smokes E Cigs equal to 1 PPD   Substance Use Topics     Alcohol use: Yes     Comment: socially--\"not very often\" \"once a month\"     History   Drug Use No     Comment: patient denies any marijuana use       REVIEW OF SYSTEMS:   Constitutional, neuro, ENT, endocrine, pulmonary, cardiac, gastrointestinal, genitourinary, musculoskeletal, integument and psychiatric systems are negative, except as otherwise noted.    EXAM:   /70 (BP Location: Right " "arm, Patient Position: Chair, Cuff Size: Adult Large)   Pulse 74   Temp 98.9  F (37.2  C) (Oral)   Resp 20   Ht 1.803 m (5' 11\")   Wt (!) 165 kg (363 lb 12.8 oz)   SpO2 97%   BMI 50.74 kg/m      GENERAL APPEARANCE: Morbidly obese habitus, alert and no distress     EYES: EOMI,  PERRL     HENT: ear canals and TM's normal and nose and mouth without ulcers or lesions     NECK: no adenopathy, no asymmetry, masses, or scars and thyroid normal to palpation     RESP: lungs clear to auscultation - no rales, rhonchi or wheezes     CV: regular rates and rhythm, normal S1 S2, no S3 or S4 and no murmur, click or rub     ABDOMEN: Easily reducible ventral abdominal hernia, nontender to palpation.  Soft, , no HSMand bowel sounds normal     MS: extremities normal- no gross deformities noted, no evidence of inflammation in joints, FROM in all extremities.     SKIN: no suspicious lesions or rashes     NEURO: Normal strength and tone, sensory exam grossly normal, mentation intact and speech normal     PSYCH: mentation appears normal. and affect normal/bright     LYMPHATICS: No cervical adenopathy    DIAGNOSTICS:   EKG: Not indicated due to non-vascular surgery and low risk of event (age <65 and without cardiac risk factors)    Recent Labs   Lab Test 12/24/19  1033 08/27/19  2050 06/25/19  0639 06/24/19  1151  03/20/17  1117  01/09/17  0849   HGB  --  13.9 11.3*  --    < > 12.7*   < > 12.9*   PLT  --  275  --  245   < > 246   < > 244   INR  --   --   --   --   --   --   --  0.92   NA  --  139 140  --    < > 140  --  140   POTASSIUM  --  3.8 4.2  --    < > 4.0  --  4.0   CR  --  0.86 0.86 0.90   < > 0.97   < > 1.06   A1C 5.1  --   --   --   --  4.6  --   --     < > = values in this interval not displayed.      IMPRESSION:   Reason for surgery/procedure: Repeat endoscopy for Patel's  Diagnosis/reason for consult: Preoperative valuation    The proposed surgical procedure is considered INTERMEDIATE risk.    REVISED CARDIAC RISK " INDEX  The patient has the following serious cardiovascular risks for perioperative complications such as (MI, PE, VFib and 3  AV Block):  No serious cardiac risks  INTERPRETATION: 0 risks: Class I (very low risk - 0.4% complication rate)    The patient has the following additional risks for perioperative complications:  No identified additional risks  Morbid obesity      ICD-10-CM    1. Preop general physical exam  Z01.818    2. Patel's esophagus determined by biopsy  K22.70    3. Hypertension goal BP (blood pressure) < 140/90  I10        RECOMMENDATIONS:       Obstructive Sleep Apnea (or suspected sleep apnea)  Patient is to bring their home CPAP with them on the day of surgery if he needs to stay longer than today.    --Patient is to take all scheduled medications on the day of surgery EXCEPT for modifications listed below.    ACE Inhibitor or Angiotensin Receptor Blocker (ARB) Use  Ace inhibitor or Angiotensin Receptor Blocker (ARB) and will continue this medication due to the higher risk of uncontrolled perioperative hypertension (e.g. neurosurgical procedure)      APPROVAL GIVEN to proceed with proposed procedure, without further diagnostic evaluation     At risk for perioperative complications including poor wound healing and increased risk of infection due to morbid obesity.    Signed Electronically by: Ciro Love MD    Copy of this evaluation report is provided to requesting physician.    [place] Preop Guidelines    Revised Cardiac Risk Index

## 2020-03-12 ENCOUNTER — HOSPITAL ENCOUNTER (OUTPATIENT)
Facility: CLINIC | Age: 40
Discharge: HOME | End: 2020-03-12
Attending: INTERNAL MEDICINE | Admitting: INTERNAL MEDICINE
Payer: COMMERCIAL

## 2020-03-12 ENCOUNTER — ANESTHESIA (OUTPATIENT)
Dept: SURGERY | Facility: CLINIC | Age: 40
End: 2020-03-12
Payer: COMMERCIAL

## 2020-03-12 ENCOUNTER — ANESTHESIA EVENT (OUTPATIENT)
Dept: SURGERY | Facility: CLINIC | Age: 40
End: 2020-03-12
Payer: COMMERCIAL

## 2020-03-12 VITALS
DIASTOLIC BLOOD PRESSURE: 70 MMHG | HEART RATE: 80 BPM | SYSTOLIC BLOOD PRESSURE: 135 MMHG | TEMPERATURE: 97.1 F | RESPIRATION RATE: 16 BRPM | OXYGEN SATURATION: 100 %

## 2020-03-12 DIAGNOSIS — K22.70 BARRETT'S ESOPHAGUS DETERMINED BY BIOPSY: Primary | ICD-10-CM

## 2020-03-12 LAB
CREAT SERPL-MCNC: 0.85 MG/DL (ref 0.66–1.25)
GFR SERPL CREATININE-BSD FRML MDRD: >90 ML/MIN/{1.73_M2}
POTASSIUM SERPL-SCNC: 4.4 MMOL/L (ref 3.4–5.3)
UPPER GI ENDOSCOPY: NORMAL

## 2020-03-12 PROCEDURE — 88305 TISSUE EXAM BY PATHOLOGIST: CPT | Performed by: INTERNAL MEDICINE

## 2020-03-12 PROCEDURE — 37000008 ZZH ANESTHESIA TECHNICAL FEE, 1ST 30 MIN: Performed by: INTERNAL MEDICINE

## 2020-03-12 PROCEDURE — 25800030 ZZH RX IP 258 OP 636: Performed by: NURSE ANESTHETIST, CERTIFIED REGISTERED

## 2020-03-12 PROCEDURE — 25000125 ZZHC RX 250: Performed by: INTERNAL MEDICINE

## 2020-03-12 PROCEDURE — 36000050 ZZH SURGERY LEVEL 2 1ST 30 MIN: Performed by: INTERNAL MEDICINE

## 2020-03-12 PROCEDURE — 84132 ASSAY OF SERUM POTASSIUM: CPT | Performed by: NURSE ANESTHETIST, CERTIFIED REGISTERED

## 2020-03-12 PROCEDURE — 25000125 ZZHC RX 250: Performed by: NURSE ANESTHETIST, CERTIFIED REGISTERED

## 2020-03-12 PROCEDURE — 71000027 ZZH RECOVERY PHASE 2 EACH 15 MINS: Performed by: INTERNAL MEDICINE

## 2020-03-12 PROCEDURE — 36415 COLL VENOUS BLD VENIPUNCTURE: CPT | Performed by: NURSE ANESTHETIST, CERTIFIED REGISTERED

## 2020-03-12 PROCEDURE — 25000128 H RX IP 250 OP 636: Performed by: NURSE ANESTHETIST, CERTIFIED REGISTERED

## 2020-03-12 PROCEDURE — 40000063 ZZH STATISTIC EGD (OR PROCEDURE): Performed by: INTERNAL MEDICINE

## 2020-03-12 PROCEDURE — 82565 ASSAY OF CREATININE: CPT | Performed by: NURSE ANESTHETIST, CERTIFIED REGISTERED

## 2020-03-12 PROCEDURE — 40000306 ZZH STATISTIC PRE PROC ASSESS II: Performed by: INTERNAL MEDICINE

## 2020-03-12 PROCEDURE — 88305 TISSUE EXAM BY PATHOLOGIST: CPT | Mod: 26 | Performed by: INTERNAL MEDICINE

## 2020-03-12 RX ORDER — PROPOFOL 10 MG/ML
INJECTION, EMULSION INTRAVENOUS PRN
Status: DISCONTINUED | OUTPATIENT
Start: 2020-03-12 | End: 2020-03-12

## 2020-03-12 RX ORDER — SODIUM CHLORIDE, SODIUM LACTATE, POTASSIUM CHLORIDE, CALCIUM CHLORIDE 600; 310; 30; 20 MG/100ML; MG/100ML; MG/100ML; MG/100ML
INJECTION, SOLUTION INTRAVENOUS CONTINUOUS
Status: DISCONTINUED | OUTPATIENT
Start: 2020-03-12 | End: 2020-03-12 | Stop reason: HOSPADM

## 2020-03-12 RX ORDER — MEPERIDINE HYDROCHLORIDE 25 MG/ML
12.5 INJECTION INTRAMUSCULAR; INTRAVENOUS; SUBCUTANEOUS
Status: DISCONTINUED | OUTPATIENT
Start: 2020-03-12 | End: 2020-03-12 | Stop reason: HOSPADM

## 2020-03-12 RX ORDER — FENTANYL CITRATE 50 UG/ML
25-50 INJECTION, SOLUTION INTRAMUSCULAR; INTRAVENOUS
Status: DISCONTINUED | OUTPATIENT
Start: 2020-03-12 | End: 2020-03-12 | Stop reason: HOSPADM

## 2020-03-12 RX ORDER — ACETAMINOPHEN 325 MG/1
975 TABLET ORAL ONCE
Status: DISCONTINUED | OUTPATIENT
Start: 2020-03-12 | End: 2020-03-12 | Stop reason: HOSPADM

## 2020-03-12 RX ORDER — FENTANYL CITRATE 50 UG/ML
INJECTION, SOLUTION INTRAMUSCULAR; INTRAVENOUS PRN
Status: DISCONTINUED | OUTPATIENT
Start: 2020-03-12 | End: 2020-03-12

## 2020-03-12 RX ORDER — ONDANSETRON 4 MG/1
4 TABLET, ORALLY DISINTEGRATING ORAL EVERY 30 MIN PRN
Status: DISCONTINUED | OUTPATIENT
Start: 2020-03-12 | End: 2020-03-12 | Stop reason: HOSPADM

## 2020-03-12 RX ORDER — PROPOFOL 10 MG/ML
INJECTION, EMULSION INTRAVENOUS CONTINUOUS PRN
Status: DISCONTINUED | OUTPATIENT
Start: 2020-03-12 | End: 2020-03-12

## 2020-03-12 RX ORDER — LIDOCAINE 40 MG/G
CREAM TOPICAL
Status: DISCONTINUED | OUTPATIENT
Start: 2020-03-12 | End: 2020-03-12 | Stop reason: HOSPADM

## 2020-03-12 RX ORDER — LABETALOL 20 MG/4 ML (5 MG/ML) INTRAVENOUS SYRINGE
10
Status: DISCONTINUED | OUTPATIENT
Start: 2020-03-12 | End: 2020-03-12 | Stop reason: HOSPADM

## 2020-03-12 RX ORDER — ONDANSETRON 2 MG/ML
4 INJECTION INTRAMUSCULAR; INTRAVENOUS EVERY 30 MIN PRN
Status: DISCONTINUED | OUTPATIENT
Start: 2020-03-12 | End: 2020-03-12 | Stop reason: HOSPADM

## 2020-03-12 RX ORDER — NALOXONE HYDROCHLORIDE 0.4 MG/ML
.1-.4 INJECTION, SOLUTION INTRAMUSCULAR; INTRAVENOUS; SUBCUTANEOUS
Status: DISCONTINUED | OUTPATIENT
Start: 2020-03-12 | End: 2020-03-12 | Stop reason: HOSPADM

## 2020-03-12 RX ORDER — FLUMAZENIL 0.1 MG/ML
0.2 INJECTION, SOLUTION INTRAVENOUS
Status: DISCONTINUED | OUTPATIENT
Start: 2020-03-12 | End: 2020-03-12 | Stop reason: HOSPADM

## 2020-03-12 RX ORDER — ONDANSETRON 2 MG/ML
4 INJECTION INTRAMUSCULAR; INTRAVENOUS
Status: DISCONTINUED | OUTPATIENT
Start: 2020-03-12 | End: 2020-03-12 | Stop reason: HOSPADM

## 2020-03-12 RX ADMIN — MIDAZOLAM 2 MG: 1 INJECTION INTRAMUSCULAR; INTRAVENOUS at 08:57

## 2020-03-12 RX ADMIN — PROPOFOL 40 MG: 10 INJECTION, EMULSION INTRAVENOUS at 09:02

## 2020-03-12 RX ADMIN — FENTANYL CITRATE 100 MCG: 50 INJECTION, SOLUTION INTRAMUSCULAR; INTRAVENOUS at 09:02

## 2020-03-12 RX ADMIN — SODIUM CHLORIDE, POTASSIUM CHLORIDE, SODIUM LACTATE AND CALCIUM CHLORIDE: 600; 310; 30; 20 INJECTION, SOLUTION INTRAVENOUS at 08:57

## 2020-03-12 RX ADMIN — LIDOCAINE HYDROCHLORIDE 20 MG: 10 INJECTION, SOLUTION EPIDURAL; INFILTRATION; INTRACAUDAL; PERINEURAL at 09:02

## 2020-03-12 RX ADMIN — PROPOFOL 50 MCG/KG/MIN: 10 INJECTION, EMULSION INTRAVENOUS at 09:02

## 2020-03-12 SDOH — HEALTH STABILITY: MENTAL HEALTH: CURRENT SMOKER: 1

## 2020-03-12 NOTE — DISCHARGE INSTRUCTIONS
GENERAL ANESTHESIA OR SEDATION ADULT DISCHARGE INSTRUCTIONS   SPECIAL PRECAUTIONS FOR 24 HOURS AFTER SURGERY    IT IS NOT UNUSUAL TO FEEL LIGHT-HEADED OR FAINT, UP TO 24 HOURS AFTER SURGERY OR WHILE TAKING PAIN MEDICATION.  IF YOU HAVE THESE SYMPTOMS; SIT FOR A FEW MINUTES BEFORE STANDING AND HAVE SOMEONE ASSIST YOU WHEN YOU GET UP TO WALK OR USE THE BATHROOM.    YOU SHOULD REST AND RELAX FOR THE NEXT 24 HOURS AND YOU MUST MAKE ARRANGEMENTS TO HAVE SOMEONE STAY WITH YOU FOR AT LEAST 24 HOURS AFTER YOUR DISCHARGE.  AVOID HAZARDOUS AND STRENUOUS ACTIVITIES.  DO NOT MAKE IMPORTANT DECISIONS FOR 24 HOURS.    DO NOT DRIVE ANY VEHICLE OR OPERATE MECHANICAL EQUIPMENT FOR 24 HOURS FOLLOWING THE END OF YOUR SURGERY.  EVEN THOUGH YOU MAY FEEL NORMAL, YOUR REACTIONS MAY BE AFFECTED BY THE MEDICATION YOU HAVE RECEIVED.    DO NOT DRINK ALCOHOLIC BEVERAGES FOR 24 HOURS FOLLOWING YOUR SURGERY.    DRINK CLEAR LIQUIDS (APPLE JUICE, GINGER ALE, 7-UP, BROTH, ETC.).  PROGRESS TO YOUR REGULAR DIET AS YOU FEEL ABLE.    YOU MAY HAVE A DRY MOUTH, A SORE THROAT, MUSCLES ACHES OR TROUBLE SLEEPING.  THESE SHOULD GO AWAY AFTER 24 HOURS.    CALL YOUR DOCTOR FOR ANY OF THE FOLLOWING:  SIGNS OF INFECTION (FEVER, GROWING TENDERNESS AT THE SURGERY SITE, A LARGE AMOUNT OF DRAINAGE OR BLEEDING, SEVERE PAIN, FOUL-SMELLING DRAINAGE, REDNESS OR SWELLING.    IT HAS BEEN OVER 8 TO 10 HOURS SINCE SURGERY AND YOU ARE STILL NOT ABLE TO URINATE (PASS WATER).     ESOPHAGOGASTRODUODENOSCOPY DISCHARGE INSTRUCTIONS    You may not drive, use heavy equipment or consume alcohol for 24 hours because the drugs you were given may cause dizziness, drowsiness, forgetfulness and slower reaction time.    Small pieces or tissue (biopsies) or polyps may have been removed.    You may resume your regular diet and medications. Exception: If you had a biopsy or polypectomy, do not take aspirin, aleve (naproxen) or ibuprofen for the next 10 days.  Tylenol (acetaminophen) is safe  to take.    Additional instructions:  If you had a biopsy or polypectomy, the pathology report will be sent to your doctor.  If you have not received the results within 10 days, call your doctor's office.    What to watch for:  Problems rarely occur after the procedure.  It is important for you to be aware of the early signs of a possible complication.  Call immediately if you notice any of the followin.  Unusual pain or difficulty swallowing.    2.  Unusual abdominal or chest pain.    3.  Vomiting of blood.    4.  Black or bloody stools.    5.  Temperature above 100.6 degrees F      DR. ALEXANDRIA LEWIS M.D.     CLINIC PHONE NUMBER:  [phone]

## 2020-03-12 NOTE — ANESTHESIA POSTPROCEDURE EVALUATION
Patient: Kimo Greenwood    Procedure(s):  ESOPHAGOGASTRODUODENOSCOPY (EGD) WITH BIOPSIES    Diagnosis:Duodenitis [K29.80]  Patel's esophagus [K22.70]  Diagnosis Additional Information: No value filed.    Anesthesia Type:  General    Note:  Anesthesia Post Evaluation    Patient location during evaluation: PACU  Patient participation: Able to fully participate in evaluation  Level of consciousness: awake and alert  Pain management: adequate  multimodal analgesia used between 6 hours prior to anesthesia start to PACU dischargeAirway patency: patent  Cardiovascular status: acceptable  Respiratory status: acceptable  two or more mitigation strategies used for obstructive sleep apneaHydration status: acceptable  PONV: none     Anesthetic complications: None          Last vitals:  Vitals:    03/12/20 0926 03/12/20 0949 03/12/20 1011   BP: 114/60 (!) 144/82 135/70   Pulse:      Resp: 16 14 16   Temp:  97.2  F (36.2  C) 97.1  F (36.2  C)   SpO2: 95% 100% 100%         Electronically Signed By: Antonino Chen MD  March 12, 2020  12:58 PM

## 2020-03-12 NOTE — ANESTHESIA PREPROCEDURE EVALUATION
Anesthesia Pre-Procedure Evaluation    Patient: Kimo Greenwood   MRN: [id number] : 1980          Preoperative Diagnosis: Duodenitis [K29.80]  Patel's esophagus [K22.70]    Procedure(s):  ESOPHAGOGASTRODUODENOSCOPY (EGD) (fv)    Past Medical History:   Diagnosis Date     Arthritis     DDD hips and knees     Asthma      Chronic pain - left hip/leg pain     degenerative disc disease, back, hips, knees     Gastroesophageal reflux disease      Hypertension      Marijuana abuse      MDD (major depressive disorder)      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) 2010    With associated speech/language delay     Obesity 2010     LOREN (obstructive sleep apnea)     Uses a CPAP     Seborrheic dermatitis      Past Surgical History:   Procedure Laterality Date     ARTHROPLASTY HIP Left 2017    Procedure: ARTHROPLASTY HIP;  Surgeon: Bala Randall MD;  Location: [facility]     ARTHROPLASTY REVISION HIP Left 2019    Procedure: Revision left total hip arthroplasty with a head and liner exchange to a Biomet dual mobility head and liner.;  Surgeon: Bala Randall MD;  Location:  OR     BLADDER SURGERY      Ibarra, [facility],Interstem stimulator implant     CLOSED REDUCTION HIP Left 6/10/2019    Procedure: Closed reduction under general anesthesia left recurrent prosthetic hip dislocation;  Surgeon: Rafat Cazares MD;  Location:  OR     COLONOSCOPY N/A 10/30/2015    Procedure: COMBINED COLONOSCOPY, SINGLE OR MULTIPLE BIOPSY/POLYPECTOMY BY BIOPSY;  Surgeon: Alexis Jackson MD;  Location:  GI     COLONOSCOPY N/A 2016    Procedure: COMBINED COLONOSCOPY, SINGLE OR MULTIPLE BIOPSY/POLYPECTOMY BY BIOPSY;  Surgeon: Cat Huber MD;  Location:  GI     ESOPHAGOSCOPY, GASTROSCOPY, DUODENOSCOPY (EGD), COMBINED N/A 2016    Procedure: COMBINED ESOPHAGOSCOPY, GASTROSCOPY, DUODENOSCOPY (EGD), BIOPSY SINGLE OR MULTIPLE;  Surgeon: Cat Huber,  MD;  [facility]     EXAM UNDER ANESTHESIA, FULGURATE CONDYLOMA ANUS, COMBINED N/A 12/22/2016    Procedure: COMBINED EXAM UNDER ANESTHESIA, FULGURATE CONDYLOMA ANUS;  Surgeon: Nya Cabello MD;  Location: [facility]     GENITOURINARY SURGERY       Anesthesia Evaluation     . Pt has had prior anesthetic. Type: General and MAC    No history of anesthetic complications          ROS/MED HX    ENT/Pulmonary:     (+)sleep apnea, Intermittent asthma , . .    Neurologic:  - neg neurologic ROS     Cardiovascular:     (+) hypertension----. : . . . :. .       METS/Exercise Tolerance:     Hematologic:  - neg hematologic  ROS       Musculoskeletal:  - neg musculoskeletal ROS       GI/Hepatic:     (+) GERD Asymptomatic on medication,       Renal/Genitourinary:  - ROS Renal section negative       Endo:     (+) Obesity, .      Psychiatric:     (+) psychiatric history depression      Infectious Disease:  - neg infectious disease ROS       Malignancy:         Other:    (+) H/O Chronic Pain,                        Physical Exam  Normal systems: cardiovascular and pulmonary    Airway   Mallampati: III  TM distance: >3 FB  Neck ROM: full    Dental   (+) chipped and missing    Cardiovascular       Pulmonary             Lab Results   Component Value Date    WBC 12.3 (H) 08/27/2019    HGB 13.9 08/27/2019    HCT 41.4 08/27/2019     08/27/2019    CRP 14.0 (H) 06/18/2019    SED 18 (H) 06/18/2019     08/27/2019    POTASSIUM 3.8 08/27/2019    CHLORIDE 107 08/27/2019    CO2 31 08/27/2019    BUN 13 08/27/2019    CR 0.86 08/27/2019    GLC 78 12/24/2019    FILEMON 9.1 08/27/2019    ALBUMIN 3.9 08/27/2019    PROTTOTAL 8.0 08/27/2019    ALT 27 08/27/2019    AST 14 08/27/2019    ALKPHOS 77 08/27/2019    BILITOTAL 0.3 08/27/2019    LIPASE 120 09/25/2015    INR 0.92 01/09/2017    TSH 2.86 07/13/2018    T4 1.38 04/10/2012       Preop Vitals  BP Readings from Last 3 Encounters:   03/12/20 128/73   03/10/20 128/70   03/09/20 126/72    Pulse  "Readings from Last 3 Encounters:   03/12/20 80   03/10/20 74   03/09/20 75      Resp Readings from Last 3 Encounters:   03/12/20 16   03/10/20 20   03/03/20 20    SpO2 Readings from Last 3 Encounters:   03/12/20 98%   03/10/20 97%   03/09/20 99%      Temp Readings from Last 1 Encounters:   03/12/20 96.7  F (35.9  C) (Temporal)    Ht Readings from Last 1 Encounters:   03/10/20 1.803 m (5' 11\")      Wt Readings from Last 1 Encounters:   03/10/20 (!) 165 kg (363 lb 12.8 oz)    Estimated body mass index is 50.74 kg/m  as calculated from the following:    Height as of 3/10/20: 1.803 m (5' 11\").    Weight as of 3/10/20: 165 kg (363 lb 12.8 oz).       Anesthesia Plan      History & Physical Review  History and physical reviewed and following examination; no interval change.    ASA Status:  3 .    NPO Status:  > 8 hours    Plan for General with Intravenous induction. Maintenance will be Balanced.    PONV prophylaxis:  Ondansetron (or other 5HT-3) and Dexamethasone or Solumedrol    The patient is a current Smoker and patient smoked on day of surgery     Postoperative Care  Postoperative pain management:  Oral pain medications and Multi-modal analgesia.      Consents  Anesthetic plan, risks, benefits and alternatives discussed with:  Patient..                 Antonino Chen MD                    .  "

## 2020-03-12 NOTE — H&P
Pre-Endoscopy History and Physical     Kimo Greenwood MRN# [id number]   YOB: 1980 Age: 39 year old     Date of Procedure: 3/12/2020  Primary care provider: Kory Hudson  Type of Endoscopy: Gastroscopy with possible biopsy, possible dilation  Reason for Procedure: Patel's  Type of Anesthesia Anticipated: Conscious Sedation    HPI:    Kimo is a 39 year old male who will be undergoing the above procedure.      A history and physical has been performed. The patient's medications and allergies have been reviewed. The risks and benefits of the procedure and the sedation options and risks were discussed with the patient.  All questions were answered and informed consent was obtained.      He denies a personal or family history of anesthesia complications or bleeding disorders.     Patient Active Problem List   Diagnosis     Speech/language delay     Tobacco use disorder     Nocturnal enuresis     Moderate major depression (H)     LOREN (obstructive sleep apnea)     Hypertension goal BP (blood pressure) < 140/90     Morbid obesity (H)     Leukocytosis     Suicidal ideation     Chronic low back pain     Bilateral low back pain with left-sided sciatica     History of colonic polyps     Mild intellectual disability     S/P total hip arthroplasty     Vitamin D deficiency     LPRD (laryngopharyngeal reflux disease)     Patel's esophagus determined by biopsy     Mild intermittent asthma     Somatic dysfunction of lumbar region     Somatic dysfunction of sacral region     Sacral pain     Thoracic segment dysfunction     Hip pain, left        Past Medical History:   Diagnosis Date     Arthritis     DDD hips and knees     Asthma      Chronic pain - left hip/leg pain     degenerative disc disease, back, hips, knees     Gastroesophageal reflux disease      Hypertension      Marijuana abuse      MDD (major depressive disorder)      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    With associated  "speech/language delay     Obesity 11/11/2010     LOREN (obstructive sleep apnea)     Uses a CPAP     Seborrheic dermatitis         Past Surgical History:   Procedure Laterality Date     ARTHROPLASTY HIP Left 1/25/2017    Procedure: ARTHROPLASTY HIP;  Surgeon: Bala Randall MD;  Location: [facility] OR     ARTHROPLASTY REVISION HIP Left 6/24/2019    Procedure: Revision left total hip arthroplasty with a head and liner exchange to a Biomet dual mobility head and liner.;  Surgeon: Bala Ranadll MD;  Location: [facility] OR     BLADDER SURGERY      I[facility]ra, [facility],Interstem stimulator implant     CLOSED REDUCTION HIP Left 6/10/2019    Procedure: Closed reduction under general anesthesia left recurrent prosthetic hip dislocation;  Surgeon: Rafat Cazares MD;  Location:  OR     COLONOSCOPY N/A 10/30/2015    Procedure: COMBINED COLONOSCOPY, SINGLE OR MULTIPLE BIOPSY/POLYPECTOMY BY BIOPSY;  Surgeon: Alexis Jackson MD;  Location:  GI     COLONOSCOPY N/A 11/17/2016    Procedure: COMBINED COLONOSCOPY, SINGLE OR MULTIPLE BIOPSY/POLYPECTOMY BY BIOPSY;  Surgeon: Cat Huber MD;  Location:  GI     ESOPHAGOSCOPY, GASTROSCOPY, DUODENOSCOPY (EGD), COMBINED N/A 11/17/2016    Procedure: COMBINED ESOPHAGOSCOPY, GASTROSCOPY, DUODENOSCOPY (EGD), BIOPSY SINGLE OR MULTIPLE;  Surgeon: Cat Huber MD;  Location:  GI     EXAM UNDER ANESTHESIA, FULGURATE CONDYLOMA ANUS, COMBINED N/A 12/22/2016    Procedure: COMBINED EXAM UNDER ANESTHESIA, FULGURATE CONDYLOMA ANUS;  Surgeon: Nya Cabello MD;  Location:  OR     GENITOURINARY SURGERY         Social History     Tobacco Use     Smoking status: Former Smoker     Packs/day: 0.50     Years: 1.00     Pack years: 0.50     Types: Cigarettes     Smokeless tobacco: Never Used     Tobacco comment: Smokes E Cigs equal to 1 PPD   Substance Use Topics     Alcohol use: Yes     Comment: socially--\"not very often\" \"once a month\"       Family " History   Problem Relation Age of Onset     Anxiety Disorder Mother      Depression Mother      Ulcerative Colitis Mother 18     Breast Cancer Maternal Grandmother      Cerebrovascular Disease Maternal Grandmother      Breast Cancer Maternal Aunt      Cancer Maternal Grandfather         grt uncles colon cancer       Prior to Admission medications    Medication Sig Start Date End Date Taking? Authorizing Provider   albuterol (VENTOLIN HFA) 108 (90 Base) MCG/ACT inhaler INHALE 2 PUFFS INTO LUNGS EVERY SIX HOURS AS NEEDED FOR SHORTNESS OF BREATH / DYSPNEA OR WHEEZING 12/31/18  Yes Kory Hudson MD   ARIPiprazole (ABILIFY) 5 MG tablet Take 1.5 tablets (7.5 mg) by mouth every morning 6/15/18  Yes Kory Hudson MD   betamethasone dipropionate (DIPROSONE) 0.05 % external ointment Apply topically 2 times daily Apply twice daily up to 2 weeks for rash.   Yes Unknown, Entered By History   Cholecalciferol (VITAMIN D) 50 MCG (2000 UT) CAPS TAKE 1 CAPSULE BY MOUTH DAILY 12/23/19  Yes Kory Hudson MD   FLUoxetine (PROZAC) 20 MG capsule Take 60 mg by mouth daily  6/15/18  Yes Reported, Patient   FLUoxetine (PROZAC) 40 MG capsule  12/10/19  Yes Reported, Patient   fluticasone (FLONASE) 50 MCG/ACT spray Spray 2 sprays into both nostrils every morning   Yes Unknown, Entered By History   hydrochlorothiazide (HYDRODIURIL) 12.5 MG tablet TAKE 1 TABLET (12.5MG) BY MOUTH DAILY 7/3/19  Yes Kory Hudson MD   hydrocortisone 2.5 % cream Apply topically 2 times daily Apply sparing to face for 1-2 weeks. 5/16/18  Yes Kory Hudson MD   lisinopril (PRINIVIL/ZESTRIL) 10 MG tablet TAKE 1 TABLET BY MOUTH EVERY MORNING 12/23/19  Yes Kory Hudson MD   mometasone-formoterol (DULERA) 100-5 MCG/ACT inhaler Inhale 2 puffs into the lungs 2 times daily 12/31/18  Yes Kory Hudson MD   oxybutynin ER (DITROPAN XL) 15 MG 24 hr tablet Take 30 mg by mouth daily   Yes Unknown, Entered By History   pantoprazole (PROTONIX) 40  "MG EC tablet TAKE 1 TABLET BY MOUTH 30-60 MINUTES BEFORE FIRST MEAL OF THE DAY 12/23/19  Yes Kory Hudson MD   prazosin (MINIPRESS) 1 MG capsule Take 3 mg by mouth At Bedtime   Yes Unknown, Entered By History   psyllium (FIBER LAXATIVE) 0.52 g capsule Take 3 capsules by mouth daily 3/3/20  Yes Kory Hudson MD   topiramate (TOPAMAX) 25 MG tablet Take 1 tablet (25 mg) by mouth 2 times daily 3/3/20  Yes Kory Hudson MD   ketoconazole (NIZORAL) 2 % external shampoo APPLY TO SCALP & BEARD TWICE WEEKLY  WASH OFF AFTER 5 MINUTES 9/12/19   Kory Hudson MD   lidocaine (LIDODERM) 5 % patch Place 1 patch onto the skin every 24 hours To prevent lidocaine toxicity, patient should be patch free for 12 hrs daily. 9/6/19   Kory Hudson MD   naproxen sodium 220 MG capsule If Tylenol is not helpful, this can be added 2 tabs twice a day with meals. 8/24/18   Reported, Patient   ondansetron (ZOFRAN-ODT) 4 MG ODT tab  6/10/19   Reported, Patient       Allergies   Allergen Reactions     No Clinical Screening - See Comments Other (See Comments)     Awoke from anesthesia with anger and ripping off dressing, after interstim placement, outside hospital 2013        REVIEW OF SYSTEMS:   5 point ROS negative except as noted above in HPI, including Gen., Resp., CV, GI &  system review.    PHYSICAL EXAM:   /73   Pulse 80   Temp 96.7  F (35.9  C) (Temporal)   Resp 16   SpO2 98%  Estimated body mass index is 50.74 kg/m  as calculated from the following:    Height as of 3/10/20: 1.803 m (5' 11\").    Weight as of 3/10/20: 165 kg (363 lb 12.8 oz).   GENERAL APPEARANCE: alert, and oriented  MENTAL STATUS: alert  AIRWAY EXAM: Mallampatti Class I (visualization of the soft palate, fauces, uvula, anterior and posterior pillars)  RESP: lungs clear to auscultation - no rales, rhonchi or wheezes  CV: regular rates and rhythm  DIAGNOSTICS:    Not indicated    IMPRESSION   ASA Class 2 - Mild systemic disease    PLAN: "   Plan for Gastroscopy with possible biopsy, possible dilation. We discussed the risks, benefits and alternatives and the patient wished to proceed.    The above has been forwarded to the consulting provider.      Signed Electronically by: You Kraus MD  March 12, 2020

## 2020-03-12 NOTE — ANESTHESIA CARE TRANSFER NOTE
Patient: Kimo Greenwood    Procedure(s):  ESOPHAGOGASTRODUODENOSCOPY (EGD) WITH BIOPSIES    Diagnosis: Duodenitis [K29.80]  Patel's esophagus [K22.70]  Diagnosis Additional Information: No value filed.    Anesthesia Type:   General     Note:  Airway :Room Air  Patient transferred to:Phase II  Handoff Report: Identifed the Patient, Identified the Reponsible Provider, Reviewed the pertinent medical history, Discussed the surgical course, Reviewed Intra-OP anesthesia mangement and issues during anesthesia, Set expectations for post-procedure period and Allowed opportunity for questions and acknowledgement of understanding      Vitals: (Last set prior to Anesthesia Care Transfer)    CRNA VITALS  3/12/2020 0847 - 3/12/2020 0921      3/12/2020             Pulse:  64    SpO2:  92 %                Electronically Signed By: LAMBERT Vieyra CRNA  March 12, 2020  9:21 AM

## 2020-03-13 LAB — COPATH REPORT: NORMAL

## 2020-06-01 ENCOUNTER — E-VISIT (OUTPATIENT)
Dept: FAMILY MEDICINE | Facility: CLINIC | Age: 40
End: 2020-06-01
Payer: COMMERCIAL

## 2020-06-01 DIAGNOSIS — F33.1 MAJOR DEPRESSIVE DISORDER, RECURRENT EPISODE, MODERATE (H): ICD-10-CM

## 2020-06-01 DIAGNOSIS — Z51.81 MEDICATION MONITORING ENCOUNTER: Primary | ICD-10-CM

## 2020-06-01 PROCEDURE — 99421 OL DIG E/M SVC 5-10 MIN: CPT | Performed by: FAMILY MEDICINE

## 2020-06-01 ASSESSMENT — ANXIETY QUESTIONNAIRES
3. WORRYING TOO MUCH ABOUT DIFFERENT THINGS: SEVERAL DAYS
5. BEING SO RESTLESS THAT IT IS HARD TO SIT STILL: NOT AT ALL
7. FEELING AFRAID AS IF SOMETHING AWFUL MIGHT HAPPEN: SEVERAL DAYS
1. FEELING NERVOUS, ANXIOUS, OR ON EDGE: SEVERAL DAYS
6. BECOMING EASILY ANNOYED OR IRRITABLE: SEVERAL DAYS
4. TROUBLE RELAXING: SEVERAL DAYS
2. NOT BEING ABLE TO STOP OR CONTROL WORRYING: SEVERAL DAYS
7. FEELING AFRAID AS IF SOMETHING AWFUL MIGHT HAPPEN: SEVERAL DAYS
GAD7 TOTAL SCORE: 6

## 2020-06-01 ASSESSMENT — PATIENT HEALTH QUESTIONNAIRE - PHQ9
SUM OF ALL RESPONSES TO PHQ QUESTIONS 1-9: 8
SUM OF ALL RESPONSES TO PHQ QUESTIONS 1-9: 8
10. IF YOU CHECKED OFF ANY PROBLEMS, HOW DIFFICULT HAVE THESE PROBLEMS MADE IT FOR YOU TO DO YOUR WORK, TAKE CARE OF THINGS AT HOME, OR GET ALONG WITH OTHER PEOPLE: SOMEWHAT DIFFICULT

## 2020-06-02 RX ORDER — ARIPIPRAZOLE 5 MG/1
7.5 TABLET ORAL EVERY MORNING
Qty: 45 TABLET | Refills: 1 | Status: SHIPPED | OUTPATIENT
Start: 2020-06-02 | End: 2020-10-26 | Stop reason: DRUGHIGH

## 2020-06-02 ASSESSMENT — ANXIETY QUESTIONNAIRES: GAD7 TOTAL SCORE: 6

## 2020-06-02 ASSESSMENT — PATIENT HEALTH QUESTIONNAIRE - PHQ9: SUM OF ALL RESPONSES TO PHQ QUESTIONS 1-9: 8

## 2020-06-04 ENCOUNTER — TRANSFERRED RECORDS (OUTPATIENT)
Dept: HEALTH INFORMATION MANAGEMENT | Facility: CLINIC | Age: 40
End: 2020-06-04

## 2020-06-15 ENCOUNTER — TELEPHONE (OUTPATIENT)
Dept: FAMILY MEDICINE | Facility: CLINIC | Age: 40
End: 2020-06-15

## 2020-06-15 DIAGNOSIS — K21.9 LPRD (LARYNGOPHARYNGEAL REFLUX DISEASE): ICD-10-CM

## 2020-06-15 DIAGNOSIS — Z11.59 ENCOUNTER FOR SCREENING FOR OTHER VIRAL DISEASES: Primary | ICD-10-CM

## 2020-06-15 DIAGNOSIS — S73.005S DISLOCATION OF LEFT HIP, SEQUELA: ICD-10-CM

## 2020-06-15 DIAGNOSIS — E66.01 MORBID OBESITY (H): Primary | ICD-10-CM

## 2020-06-15 DIAGNOSIS — I10 HYPERTENSION GOAL BP (BLOOD PRESSURE) < 140/90: ICD-10-CM

## 2020-06-15 DIAGNOSIS — K29.80 DUODENITIS: ICD-10-CM

## 2020-06-15 NOTE — TELEPHONE ENCOUNTER
Reason for call:  Other   Patient called regarding (reason for call): call back and prescription  Additional comments: Patient is requesting a written order for an extended shoe horn be sent into the [facility] in [place]     Phone number to reach patient:  Cell number on file:    Telephone Information:   Mobile [phone]       Best Time:  any    Can we leave a detailed message on this number?  YES    Travel screening: Negative

## 2020-06-16 RX ORDER — PANTOPRAZOLE SODIUM 40 MG/1
TABLET, DELAYED RELEASE ORAL
Qty: 28 TABLET | Refills: 5 | Status: SHIPPED | OUTPATIENT
Start: 2020-06-16 | End: 2021-01-20

## 2020-06-16 RX ORDER — LISINOPRIL 10 MG/1
TABLET ORAL
Qty: 30 TABLET | Refills: 6 | Status: SHIPPED | OUTPATIENT
Start: 2020-06-16 | End: 2020-12-08

## 2020-06-16 NOTE — TELEPHONE ENCOUNTER
Routing refill request to provider for review/approval because:  Drug interaction warning    Prescription approved per [facility] Refill Protocol.  Eliana Concepcion RN

## 2020-06-17 ENCOUNTER — HOSPITAL ENCOUNTER (OUTPATIENT)
Dept: GENERAL RADIOLOGY | Facility: CLINIC | Age: 40
Discharge: HOME | End: 2020-06-17
Attending: ORTHOPAEDIC SURGERY | Admitting: ORTHOPAEDIC SURGERY
Payer: COMMERCIAL

## 2020-06-17 VITALS — DIASTOLIC BLOOD PRESSURE: 76 MMHG | HEART RATE: 77 BPM | SYSTOLIC BLOOD PRESSURE: 140 MMHG

## 2020-06-17 DIAGNOSIS — M16.9 HIP OSTEOARTHRITIS: ICD-10-CM

## 2020-06-17 DIAGNOSIS — M19.90 ARTHRITIS: ICD-10-CM

## 2020-06-17 PROCEDURE — 25500064 ZZH RX 255 OP 636: Performed by: PHYSICIAN ASSISTANT

## 2020-06-17 PROCEDURE — 77002 NEEDLE LOCALIZATION BY XRAY: CPT

## 2020-06-17 PROCEDURE — 25000128 H RX IP 250 OP 636

## 2020-06-17 PROCEDURE — 25000125 ZZHC RX 250

## 2020-06-17 RX ORDER — TRIAMCINOLONE ACETONIDE 40 MG/ML
INJECTION, SUSPENSION INTRA-ARTICULAR; INTRAMUSCULAR
Status: COMPLETED
Start: 2020-06-17 | End: 2020-06-17

## 2020-06-17 RX ORDER — BUPIVACAINE HYDROCHLORIDE 5 MG/ML
4 INJECTION, SOLUTION EPIDURAL; INTRACAUDAL ONCE
Status: COMPLETED | OUTPATIENT
Start: 2020-06-17 | End: 2020-06-17

## 2020-06-17 RX ORDER — LIDOCAINE HYDROCHLORIDE 10 MG/ML
5 INJECTION, SOLUTION EPIDURAL; INFILTRATION; INTRACAUDAL; PERINEURAL ONCE
Status: COMPLETED | OUTPATIENT
Start: 2020-06-17 | End: 2020-06-17

## 2020-06-17 RX ORDER — LIDOCAINE HYDROCHLORIDE 10 MG/ML
INJECTION, SOLUTION EPIDURAL; INFILTRATION; INTRACAUDAL; PERINEURAL
Status: COMPLETED
Start: 2020-06-17 | End: 2020-06-17

## 2020-06-17 RX ORDER — TRIAMCINOLONE ACETONIDE 40 MG/ML
40 INJECTION, SUSPENSION INTRA-ARTICULAR; INTRAMUSCULAR ONCE
Status: COMPLETED | OUTPATIENT
Start: 2020-06-17 | End: 2020-06-17

## 2020-06-17 RX ORDER — BUPIVACAINE HYDROCHLORIDE 5 MG/ML
INJECTION, SOLUTION EPIDURAL; INTRACAUDAL
Status: COMPLETED
Start: 2020-06-17 | End: 2020-06-17

## 2020-06-17 RX ORDER — IOPAMIDOL 408 MG/ML
10 INJECTION, SOLUTION INTRATHECAL ONCE
Status: COMPLETED | OUTPATIENT
Start: 2020-06-17 | End: 2020-06-17

## 2020-06-17 RX ADMIN — BUPIVACAINE HYDROCHLORIDE 150 MG: 5 INJECTION, SOLUTION EPIDURAL; INTRACAUDAL at 13:23

## 2020-06-17 RX ADMIN — TRIAMCINOLONE ACETONIDE 40 MG: 40 INJECTION, SUSPENSION INTRA-ARTICULAR; INTRAMUSCULAR at 13:23

## 2020-06-17 RX ADMIN — LIDOCAINE HYDROCHLORIDE 5 ML: 10 INJECTION, SOLUTION EPIDURAL; INFILTRATION; INTRACAUDAL; PERINEURAL at 13:21

## 2020-06-17 RX ADMIN — BUPIVACAINE HYDROCHLORIDE 150 MG: 5 INJECTION, SOLUTION EPIDURAL; INTRACAUDAL; PERINEURAL at 13:23

## 2020-06-17 RX ADMIN — IOPAMIDOL 10 ML: 408 INJECTION, SOLUTION INTRATHECAL at 13:22

## 2020-06-17 NOTE — PROGRESS NOTES
Pt was in Radiology today for a right hip steroid injection. Pt tolerated ortho procedure well. Pre procedure pain was 8 post procedure pain level 0.Procedure was completed by TERRANCE EDWARDS. There were no complications during this procedure. Pt verbalized understanding of written and verbal instructions and left department in stable and satisfactory condition with ILS worker. There is no evidence of bleeding or any other complications upon discharge.

## 2020-06-20 ENCOUNTER — HOSPITAL ENCOUNTER (EMERGENCY)
Facility: CLINIC | Age: 40
Discharge: HOME | End: 2020-06-21
Attending: EMERGENCY MEDICINE | Admitting: EMERGENCY MEDICINE
Payer: COMMERCIAL

## 2020-06-20 DIAGNOSIS — F22 PARANOIA (H): ICD-10-CM

## 2020-06-20 PROCEDURE — 99283 EMERGENCY DEPT VISIT LOW MDM: CPT | Mod: Z6 | Performed by: EMERGENCY MEDICINE

## 2020-06-20 PROCEDURE — 90791 PSYCH DIAGNOSTIC EVALUATION: CPT

## 2020-06-20 PROCEDURE — 99285 EMERGENCY DEPT VISIT HI MDM: CPT | Mod: 25 | Performed by: EMERGENCY MEDICINE

## 2020-06-20 NOTE — ED AVS SNAPSHOT
[facility], [place], Emergency Department  [address]  [facility] [address]  Phone:  [phone]  Fax:  [phone]                                    Kimo Greenwood   MRN: [id number]    Department:  [facility], Emergency Department   Date of Visit:  6/20/2020           After Visit Summary Signature Page    I have received my discharge instructions, and my questions have been answered. I have discussed any challenges I see with this plan with the nurse or doctor.    ..........................................................................................................................................  Patient/Patient Representative Signature      ..........................................................................................................................................  Patient Representative Print Name and Relationship to Patient    ..................................................               ................................................  Date                                   Time    ..........................................................................................................................................  Reviewed by Signature/Title    ...................................................              ..............................................  Date                                               Time          22EPIC Rev 08/18

## 2020-06-21 VITALS
SYSTOLIC BLOOD PRESSURE: 133 MMHG | OXYGEN SATURATION: 97 % | DIASTOLIC BLOOD PRESSURE: 75 MMHG | TEMPERATURE: 96.7 F | RESPIRATION RATE: 16 BRPM | HEART RATE: 76 BPM

## 2020-06-21 NOTE — DISCHARGE INSTRUCTIONS
Continue with your outpatient medications. Follow up with your outpatient provider. Return with concerns.

## 2020-06-21 NOTE — ED PROVIDER NOTES
[facility] EMERGENCY DEPARTMENT ([facility])     June 20, 2020  History     Chief Complaint   Patient presents with     Mental Health Problem     verbal aggresive behavior w/parents.  lives w/parents.  mom is care provider     HPI  Kimo Greenwood is a 39 year old male with a medical history significant for mild mental retardation, GERD, HTN, marijuana abuse, MDD, LOREN, seborrheic dermatitis, Patel's esophagus, morbid obesity, suicidal ideations, and S/P total hip arthroplasty who presents to the ED today for mental health problem. He has an intellectual disability and lives at home with his mom and step-dad. He has had long term issues with low-level paranoia - often thinks people are talking about him. He states that he's recently hearing voices of neighbors and strangers calling him a child molester. Last week he made comments about wishing he could get a gun so he could kill someone. His mother reports that he has intermittent outbursts of yelling, now as often as 2-3 times per day, and was slamming doors today at the house. He also went out today an came home yelling that a lady was calling him a child molester at a bar today. All sx have been increasing x 1 week. He denies SI or HI. He denies drug or alcohol abuse - occasional drug or alcohol. His mother is his guardian.     I have reviewed the Medications, Allergies, Past Medical and Surgical History, and Social History in the [facility] system.  PAST MEDICAL HISTORY:   Past Medical History:   Diagnosis Date     Arthritis     DDD hips and knees     Asthma      Chronic pain - left hip/leg pain     degenerative disc disease, back, hips, knees     Gastroesophageal reflux disease      Hypertension      Marijuana abuse      MDD (major depressive disorder)      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    With associated speech/language delay     Obesity 11/11/2010     LOREN (obstructive sleep apnea)     Uses a CPAP     Seborrheic dermatitis        PAST  SURGICAL HISTORY:   Past Surgical History:   Procedure Laterality Date     ARTHROPLASTY HIP Left 1/25/2017    Procedure: ARTHROPLASTY HIP;  Surgeon: Bala Randall MD;  Location: [facility] OR     ARTHROPLASTY REVISION HIP Left 6/24/2019    Procedure: Revision left total hip arthroplasty with a head and liner exchange to a Biomet dual mobility head and liner.;  Surgeon: Bala Randall MD;  Location: [facility] OR     BLADDER SURGERY      [facility], [facility],Interstem stimulator implant     CLOSED REDUCTION HIP Left 6/10/2019    Procedure: Closed reduction under general anesthesia left recurrent prosthetic hip dislocation;  Surgeon: Rafat Cazares MD;  Location:  OR     COLONOSCOPY N/A 10/30/2015    Procedure: COMBINED COLONOSCOPY, SINGLE OR MULTIPLE BIOPSY/POLYPECTOMY BY BIOPSY;  Surgeon: Alexis Jackson MD;  Location:  GI     COLONOSCOPY N/A 11/17/2016    Procedure: COMBINED COLONOSCOPY, SINGLE OR MULTIPLE BIOPSY/POLYPECTOMY BY BIOPSY;  Surgeon: Cat Huber MD;  Location:  GI     ESOPHAGOSCOPY, GASTROSCOPY, DUODENOSCOPY (EGD), COMBINED N/A 11/17/2016    Procedure: COMBINED ESOPHAGOSCOPY, GASTROSCOPY, DUODENOSCOPY (EGD), BIOPSY SINGLE OR MULTIPLE;  Surgeon: Cat Huber MD;  Location:  GI     ESOPHAGOSCOPY, GASTROSCOPY, DUODENOSCOPY (EGD), COMBINED N/A 3/12/2020    Procedure: ESOPHAGOGASTRODUODENOSCOPY WITH BIOPSIES;  Surgeon: You Kraus MD;  Location:  OR     EXAM UNDER ANESTHESIA, FULGURATE CONDYLOMA ANUS, COMBINED N/A 12/22/2016    Procedure: COMBINED EXAM UNDER ANESTHESIA, FULGURATE CONDYLOMA ANUS;  Surgeon: Nya Cabello MD;  Location: [facility] OR     GENITOURINARY SURGERY         Past medical history, past surgical history, medications, and allergies were reviewed with the patient. Additional pertinent items: None    FAMILY HISTORY:   Family History   Problem Relation Age of Onset     Anxiety Disorder Mother      Depression Mother      Ulcerative  "Colitis Mother 18     Breast Cancer Maternal Grandmother      Cerebrovascular Disease Maternal Grandmother      Breast Cancer Maternal Aunt      Cancer Maternal Grandfather         grt uncles colon cancer       SOCIAL HISTORY:   Social History     Tobacco Use     Smoking status: Former Smoker     Packs/day: 0.50     Years: 1.00     Pack years: 0.50     Types: Cigarettes     Smokeless tobacco: Never Used     Tobacco comment: Smokes E Cigs equal to 1 PPD   Substance Use Topics     Alcohol use: Yes     Comment: socially--\"not very often\" \"once a month\"     Social history was reviewed with the patient. Additional pertinent items: None      Patient's Medications   New Prescriptions    No medications on file   Previous Medications    ALBUTEROL (VENTOLIN HFA) 108 (90 BASE) MCG/ACT INHALER    INHALE 2 PUFFS INTO LUNGS EVERY SIX HOURS AS NEEDED FOR SHORTNESS OF BREATH / DYSPNEA OR WHEEZING    ARIPIPRAZOLE (ABILIFY) 5 MG TABLET    Take 1.5 tablets (7.5 mg) by mouth every morning    BETAMETHASONE DIPROPIONATE (DIPROSONE) 0.05 % EXTERNAL OINTMENT    Apply topically 2 times daily Apply twice daily up to 2 weeks for rash.    CALCIUM POLYCARBOPHIL (FIBERCON) 625 MG TABLET    Take 2 tablets (1,250 mg) by mouth 2 times daily    CHOLECALCIFEROL (VITAMIN D) 50 MCG (2000 UT) CAPS    TAKE 1 CAPSULE BY MOUTH DAILY    FIBER LAXATIVE 0.52 G CAPSULE    TAKE 3 CAPSULES BY MOUTH DAILY    FLUOXETINE (PROZAC) 20 MG CAPSULE    Take 60 mg by mouth daily     FLUOXETINE (PROZAC) 40 MG CAPSULE        FLUTICASONE (FLONASE) 50 MCG/ACT SPRAY    Spray 2 sprays into both nostrils every morning    HYDROCHLOROTHIAZIDE (HYDRODIURIL) 12.5 MG TABLET    TAKE 1 TABLET (12.5MG) BY MOUTH DAILY    HYDROCORTISONE 2.5 % CREAM    Apply topically 2 times daily Apply sparing to face for 1-2 weeks.    KETOCONAZOLE (NIZORAL) 2 % EXTERNAL SHAMPOO    APPLY TO SCALP & BEARD TWICE WEEKLY  WASH OFF AFTER 5 MINUTES    LIDOCAINE (LIDODERM) 5 % PATCH    Place 1 patch onto the " skin every 24 hours To prevent lidocaine toxicity, patient should be patch free for 12 hrs daily.    LISINOPRIL (ZESTRIL) 10 MG TABLET    TAKE 1 TABLET BY MOUTH EVERY MORNING    MOMETASONE-FORMOTEROL (DULERA) 100-5 MCG/ACT INHALER    Inhale 2 puffs into the lungs 2 times daily    NAPROXEN SODIUM 220 MG CAPSULE    If Tylenol is not helpful, this can be added 2 tabs twice a day with meals.    ONDANSETRON (ZOFRAN-ODT) 4 MG ODT TAB        ORDER FOR DME    Shoe horn with extended handle  [facility]  Phone: [phone]  Fax: [phone]    OXYBUTYNIN ER (DITROPAN XL) 15 MG 24 HR TABLET    Take 30 mg by mouth daily    PANTOPRAZOLE (PROTONIX) 40 MG EC TABLET    TAKE 1 TABLET BY MOUTH 30-60 MINUTES BEFORE FIRST MEAL OF THE DAY    PRAZOSIN (MINIPRESS) 1 MG CAPSULE    Take 3 mg by mouth At Bedtime    TOPIRAMATE (TOPAMAX) 25 MG TABLET    Take 1 tablet (25 mg) by mouth 2 times daily   Modified Medications    No medications on file   Discontinued Medications    No medications on file          Allergies   Allergen Reactions     No Clinical Screening - See Comments Other (See Comments)     Awoke from anesthesia with anger and ripping off dressing, after interstim placement, outside hospital 2013        Review of Systems  A complete review of systems was performed with pertinent positives and negatives noted in the HPI, and all other systems negative.    Physical Exam   BP: 115/62  Pulse: 83  Heart Rate: 79  Temp: 97.8  F (36.6  C)  Resp: 16  SpO2: 98 %      Physical Exam  Constitutional:       General: He is not in acute distress.     Appearance: He is not diaphoretic.   HENT:      Head: Atraumatic.   Eyes:      General: No scleral icterus.  Cardiovascular:      Heart sounds: Normal heart sounds.   Pulmonary:      Effort: No respiratory distress.      Breath sounds: Normal breath sounds.   Abdominal:      Palpations: Abdomen is soft.      Tenderness: There is no abdominal tenderness.   Musculoskeletal:         General: No  tenderness.   Skin:     General: Skin is warm.      Findings: No rash.         ED Course        Procedures                           No results found for this or any previous visit (from the past 24 hour(s)).  Medications - No data to display          Assessments & Plan (with Medical Decision Making)   The patient appears medically stable at this time.  He is calm, cooperative.  He denies suicidal homicidal ideation.  The [facility]  did see him, did speak with his mother as well.  She certainly concerned about what is been going on with him lately.  However, the [facility]  is not recommending admission at this time.  He does not feel that the patient represents a danger to himself or others.  He is recommending continue with his meds and following up with outpatient providers.  We did opt to have the patient sleep in the ER to make sure there was not any outbursts or issues here overnight or early morning.  Patient be signed out to the oncoming provider with a plan for us to call the patient's mother around 9 AM, she has not arrived yet, to come and pick him up for discharge.    Dictation Disclaimer: Some of this Note has been completed with voice-recognition dictation software. Although errors are generally corrected real-time, there is the potential for a rare error to be present in the completed chart.      I have reviewed the nursing notes.    I have reviewed the findings, diagnosis, plan and need for follow up with the patient.    New Prescriptions    No medications on file       Final diagnoses:   Paranoia (H)       6/20/2020   [facility] [place], EMERGENCY DEPARTMENT     Chelsi Waggoner MD  06/21/20 0644       Chelsi Waggoner MD  06/21/20 0737

## 2020-06-21 NOTE — ED NOTES
Writer had not heard from mom since start of shift. Writer just called mom and spoke with her. Mom states she will be on her way here shortly to get pt. MD notified.

## 2020-06-21 NOTE — ED NOTES
"Pts mom states that pt has had increased paranoia and depression s/s since Wednesday.. Pt has had episodes of yelling and swearing at mom, accusing her of talking about him and others accusing him of being a child molester.  Mom states pt has become \"extremely Angry\" when he hears things that are not there or happening.  Threatening to \"shot them\" \"I just want to kill them\" Mom states this is a new behavior for the pt   "

## 2020-06-30 ENCOUNTER — HOSPITAL ENCOUNTER (OUTPATIENT)
Dept: ULTRASOUND IMAGING | Facility: CLINIC | Age: 40
Discharge: HOME | End: 2020-06-30
Attending: FAMILY MEDICINE | Admitting: FAMILY MEDICINE
Payer: COMMERCIAL

## 2020-06-30 DIAGNOSIS — N50.89 TESTICLE LUMP: ICD-10-CM

## 2020-06-30 PROCEDURE — 93976 VASCULAR STUDY: CPT

## 2020-07-02 ENCOUNTER — TELEPHONE (OUTPATIENT)
Dept: FAMILY MEDICINE | Facility: CLINIC | Age: 40
End: 2020-07-02

## 2020-07-02 NOTE — TELEPHONE ENCOUNTER
----- Message from Kory Hudson MD sent at 7/2/2020 10:26 AM CDT -----  Normal US without any abnormality

## 2020-07-05 ENCOUNTER — VIRTUAL VISIT (OUTPATIENT)
Dept: FAMILY MEDICINE | Facility: OTHER | Age: 40
End: 2020-07-05

## 2020-07-05 NOTE — PROGRESS NOTES
"Date: 2020 14:59:21  Clinician: Triny Obrien  Clinician NPI: [id number]  Patient: Kimo Greenwood  Patient : 1980  Patient Address: [address], [place] [address]  Patient Phone: [phone]  Visit Protocol: URI  Patient Summary:  Kimo is a 39 year old ( : 1980 ) male who initiated a Visit for COVID-19 (Coronavirus) evaluation and screening. When asked the question \"Please sign me up to receive news, health information and promotions. \", Kimo responded \"No\".    When asked when his symptoms started, Kimo reported that he does not have any symptoms.   He denies having recent facial or sinus surgery in the past 60 days and taking antibiotic medication in the past month.    Pertinent COVID-19 (Coronavirus) information  In the past 14 days, Kimo has not worked in a congregate living setting.   He does not work or volunteer as healthcare worker or a  and does not work or volunteer in a healthcare facility.   Kimo also has not lived in a congregate living setting in the past 14 days. He does not live with a healthcare worker.   Kimo has had a close contact with a laboratory-confirmed COVID-19 patient in the last 14 days. Additional information about contact with COVID-19 (Coronavirus) patient as reported by the patient (free text): Niece  at her house   Pertinent medical history  Kimo does not need a return to work/school note.   Weight: 350 lbs   Kimo smokes or uses smokeless tobacco.   Weight: 350 lbs    MEDICATIONS: No current medications, ALLERGIES: NKDA  Clinician Response:  Dear Kimo,   Based on your exposure to.COVID-19 (Coronavirus), we would like to test you for this virus.  1. Please call [phone] to schedule your visit. Explain that you were referred by [facility] to have a COVID-19 test. Be ready to share your [facility] visit ID number.  The following will serve as your written order for this COVID Test, " ordered by me, for the indication of suspected COVID [Z20.828]: The test will be ordered in [employer], our electronic health record, after you are scheduled. It will show as ordered and authorized by Rohan Amato MD.  Order: COVID-19 (Coronavirus) PCR for ASYMPTOMATIC EXPOSURE testing from [facility].  If you know you have had close contact with someone who tested positive, you should be quarantined for 14 days after this exposure. You should stay in quarantine for the14 days even if the covid test is negative, the optimal time to test after exposure is 5-7 days from the exposure  Quarantine means   What should I do?  For safety, it's very important to follow these rules. Do this for 14 days after the date you were last exposed to the virus..  Stay home and away from others. Don't go to school or anywhere else. Generally quarantine means staying home for work but there are some exceptions to this. Please contact your workplace.   No hugging, kissing or shaking hands.  Don't let anyone visit.  Cover your mouth and nose with a mask, tissue or washcloth to avoid spreading germs.  Wash your hands and face often. Use soap and water.  What are the symptoms of COVID-19?  The most common symptoms are cough, fever and trouble breathing. Less common symptoms include headache, body aches, fatigue (feeling very tired), chills, sore throat, stuffy or runny nose, diarrhea (loose poop), loss of taste or smell, belly pain, and nausea or vomiting (feeling sick to your stomach or throwing up).  After 14 days, if you have still don't have symptoms, you likely don't have this virus.  If you develop symptoms, follow these guidelines.  If you're normally healthy: Please start another [facility] visit to report your symptoms. Go to [email or web address].  If you have a serious health problem (like cancer, heart failure, an organ transplant or kidney disease): Call your specialty clinic. Let them know that you might have COVID-19.  2. When it's time for your COVID  test:  Stay at least 6 feet away from others. (If someone will drive you to your test, stay in the backseat, as far away from the  as you can.)  Cover your mouth and nose with a mask, tissue or washcloth.  Go straight to the testing site. Don't make any stops on the way there or back.  Please note  Caregivers in these groups are at risk for severe illness due to COVID-19:  o People 65 years and older  o People who live in a nursing home or long-term care facility  o People with chronic disease (lung, heart, cancer, diabetes, kidney, liver, immunologic)  o People who have a weakened immune system, including those who:  Are in cancer treatment  Take medicine that weakens the immune system, such as corticosteroids  Had a bone marrow or organ transplant  Have an immune deficiency  Have poorly controlled HIV or AIDS  Are obese (body mass index of 40 or higher)  Smoke regularly  Where can I get more information?  [facility] -- About COVID-19: [email or web address]  CDC -- What to Do If You're Sick: www.cdc.gov/coronavirus/2019-ncov/about/steps-when-sick.html  CDC -- Ending Home Isolation: www.cdc.gov/coronavirus/2019-ncov/hcp/disposition-in-home-patients.html  CDC -- Caring for Someone: www.cdc.gov/coronavirus/2019-ncov/if-you-are-sick/care-for-someone.html  [facility] -- Interim Guidance for Hospital Discharge to Home: www.health.[facility].[place].us/diseases/coronavirus/hcp/hospdischarge.pdf  [facility] clinical trials (COVID-19 research studies): clinicalaffairs.[facility]/n-clinical-trials  Below are the COVID-19 hotlines at the [facility] ([facility]). Interpreters are available.  For health questions: Call 626-459-3206 or 1-514.808.7525 (7 a.m. to 7 p.m.) For questions about schools and childcare: Call 694-399-3585 or 1-133.813.6995 (7 a.m. to 7 p.m.)    Diagnosis: Contact with and (suspected) exposure to other viral communicable diseases  Diagnosis ICD: Z20.828

## 2020-07-14 ENCOUNTER — AMBULATORY - HEALTHEAST (OUTPATIENT)
Dept: FAMILY MEDICINE | Facility: CLINIC | Age: 40
End: 2020-07-14

## 2020-07-14 DIAGNOSIS — Z20.822 SUSPECTED COVID-19 VIRUS INFECTION: ICD-10-CM

## 2020-07-15 ENCOUNTER — AMBULATORY - HEALTHEAST (OUTPATIENT)
Dept: FAMILY MEDICINE | Facility: CLINIC | Age: 40
End: 2020-07-15

## 2020-07-15 DIAGNOSIS — Z20.822 SUSPECTED COVID-19 VIRUS INFECTION: ICD-10-CM

## 2020-07-27 NOTE — NURSING NOTE
"Chief Complaint   Patient presents with     Pain     low back pain       Initial /78  Pulse 83  Wt (!) 151 kg (333 lb)  SpO2 98%  BMI 46.44 kg/m2 Estimated body mass index is 46.44 kg/(m^2) as calculated from the following:    Height as of 4/13/17: 1.803 m (5' 11\").    Weight as of this encounter: 151 kg (333 lb).  [facility]    " yesterday

## 2020-08-12 DIAGNOSIS — K59.01 SLOW TRANSIT CONSTIPATION: ICD-10-CM

## 2020-08-12 RX ORDER — CALCIUM POLYCARBOPHIL 625 MG
TABLET ORAL
Qty: 120 TABLET | Refills: 10 | Status: SHIPPED | OUTPATIENT
Start: 2020-08-12 | End: 2021-08-24

## 2020-08-17 ENCOUNTER — TELEPHONE (OUTPATIENT)
Dept: FAMILY MEDICINE | Facility: CLINIC | Age: 40
End: 2020-08-17

## 2020-08-17 ENCOUNTER — MEDICAL CORRESPONDENCE (OUTPATIENT)
Dept: HEALTH INFORMATION MANAGEMENT | Facility: CLINIC | Age: 40
End: 2020-08-17

## 2020-08-17 DIAGNOSIS — G47.33 OSA (OBSTRUCTIVE SLEEP APNEA): Primary | ICD-10-CM

## 2020-08-17 NOTE — TELEPHONE ENCOUNTER
Rx is printed need to know where patient would like us send the Rx to?  [facility] is [facility] in Nurse's folder.  Fatou Pantoja

## 2020-08-17 NOTE — TELEPHONE ENCOUNTER
General Call:     Who is calling:  Willard    Reason for Call:  Pt is needing a new mask/tubes for his cpap    What are your questions or concerns:  Does he need a new rx for this? Can Tristan send this in?    Date of last appointment with provider: 03/03/20    Okay to leave a detailed message:Yes at Cell number on file:    Telephone Information:   Mobile [phone]

## 2020-08-21 NOTE — TELEPHONE ENCOUNTER
Spoke with mom, she stated patient doesn't need a Rx [facility] had a script for him. Fatou Pantoja

## 2020-09-02 DIAGNOSIS — F33.3 SEVERE RECURRENT MAJOR DEPRESSION WITH PSYCHOTIC FEATURES (H): Primary | ICD-10-CM

## 2020-09-02 PROCEDURE — 82947 ASSAY GLUCOSE BLOOD QUANT: CPT | Performed by: NURSE PRACTITIONER

## 2020-09-02 PROCEDURE — 80061 LIPID PANEL: CPT | Performed by: NURSE PRACTITIONER

## 2020-09-02 PROCEDURE — 36415 COLL VENOUS BLD VENIPUNCTURE: CPT | Performed by: NURSE PRACTITIONER

## 2020-09-03 LAB
CHOLEST SERPL-MCNC: 157 MG/DL
GLUCOSE SERPL-MCNC: 90 MG/DL (ref 70–99)
HDLC SERPL-MCNC: 46 MG/DL
LDLC SERPL CALC-MCNC: 99 MG/DL
NONHDLC SERPL-MCNC: 111 MG/DL
TRIGL SERPL-MCNC: 60 MG/DL

## 2020-09-09 ENCOUNTER — TRANSFERRED RECORDS (OUTPATIENT)
Dept: HEALTH INFORMATION MANAGEMENT | Facility: CLINIC | Age: 40
End: 2020-09-09

## 2020-09-14 ENCOUNTER — E-VISIT (OUTPATIENT)
Dept: FAMILY MEDICINE | Facility: CLINIC | Age: 40
End: 2020-09-14
Payer: COMMERCIAL

## 2020-09-14 DIAGNOSIS — Z53.9 ERRONEOUS ENCOUNTER--DISREGARD: Primary | ICD-10-CM

## 2020-09-14 DIAGNOSIS — L21.9 SEBORRHEIC DERMATITIS: ICD-10-CM

## 2020-09-14 RX ORDER — KETOCONAZOLE 20 MG/ML
SHAMPOO TOPICAL
Qty: 120 ML | Refills: 8 | Status: SHIPPED | OUTPATIENT
Start: 2020-09-14 | End: 2021-09-01

## 2020-09-14 NOTE — TELEPHONE ENCOUNTER
We would need to see him in clinic to evaluate for leg swelling.  This week same day or good req slot ok.

## 2020-09-14 NOTE — TELEPHONE ENCOUNTER
Routing refill request to provider for review/approval because:  Drug interaction warning  Anupama Ross RN, BSN

## 2020-09-15 NOTE — TELEPHONE ENCOUNTER
Please call pt to schedule him in with Dr Hudson per his note.  Pt read [employer] message but unsure if the scheduling staff read the note that it is ok to take a spot.  Anupama Ross RN, BSN

## 2020-09-16 ENCOUNTER — TRANSFERRED RECORDS (OUTPATIENT)
Dept: HEALTH INFORMATION MANAGEMENT | Facility: CLINIC | Age: 40
End: 2020-09-16

## 2020-09-16 NOTE — PROGRESS NOTES
Pre-Visit Planning     Future Appointments   Date Time Provider Department Center   9/18/2020  2:00 PM Kory Hudson MD [facility]   10/30/2020  3:20 PM Kory Hudson MD [facility]     Arrival Time for this Appointment:  1:50 PM   Appointment Notes for this encounter: leg swelling      Questionnaires Reviewed/Assigned  Will need ACT    Health Maintenance Due   Topic Date Due     ANNUAL REVIEW OF HM ORDERS  1980     URINE DRUG SCREEN  08/22/2016     ASTHMA ACTION PLAN  03/20/2018     COLORECTAL CANCER SCREENING  11/17/2019     PREVENTIVE CARE VISIT  07/30/2020     INFLUENZA VACCINE (1) 09/01/2020     ASTHMA CONTROL TEST  09/03/2020          Labwork-    9/2/2020 lipid and BS WNL  Labs pending for June Evisit    Imaging-    6/17/2020 xray for right hip injection   6/30/2020 testicular US - normal     ER- 6/20/2020  [facility] for mental health     Specialty visits - 9/9/2020 urology     My Chart active-  Yes     Patient preferred phone number: [phone]          Spoke to patient via phone. Are there any additional questions or concerns you'd like to review with your provider during your visit? No    Patient does not have additional questions or concerns.        Visit is not preventive.    Meds  Is there anything on your medication list that needs to be updated? Pt did not have medications with him to review, he will bring to [facility] for review     Current Outpatient Medications   Medication     albuterol (VENTOLIN HFA) 108 (90 Base) MCG/ACT inhaler     ARIPiprazole (ABILIFY) 5 MG tablet     betamethasone dipropionate (DIPROSONE) 0.05 % external ointment     Calcium Polycarbophil (FIBER) 625 MG tablet     Cholecalciferol (VITAMIN D) 50 MCG (2000 UT) CAPS     FIBER LAXATIVE 0.52 g capsule     FLUoxetine (PROZAC) 20 MG capsule     FLUoxetine (PROZAC) 40 MG capsule     fluticasone (FLONASE) 50 MCG/ACT spray     hydrochlorothiazide (HYDRODIURIL) 12.5 MG tablet     hydrocortisone 2.5 % cream     ketoconazole (NIZORAL) 2  "% external shampoo     lidocaine (LIDODERM) 5 % patch     lisinopril (ZESTRIL) 10 MG tablet     mometasone-formoterol (DULERA) 100-5 MCG/ACT inhaler     naproxen sodium 220 MG capsule     ondansetron (ZOFRAN-ODT) 4 MG ODT tab     order for DME     order for DME     oxybutynin ER (DITROPAN XL) 15 MG 24 hr tablet     pantoprazole (PROTONIX) 40 MG EC tablet     prazosin (MINIPRESS) 1 MG capsule     topiramate (TOPAMAX) 25 MG tablet     No current facility-administered medications for this visit.      Which pharmacy do you prefer to use for medications during this visit if needed? Pended     Do you need refills on any of your medications? No      Patient is due for:  HCM shows due for colon screening  this is up to date see sabino raya     Endoscopy 3/2020    Colonoscopy due 2023     As we discussed, we will plan for a repeat colonoscopy in 5 years (given past history of adenomatous polyps) and a repeat upper endoscopy in 3 years (for monitoring of Patel's esophagus).     MyChart  Patient is active on [employer].    Questionnaire Review   NA     Call Summary  \"Thank you for your time today.  If anything comes up before your appointment, please feel free to contact us at [phone].\"     Eliana Concepcion RN     "

## 2020-09-18 ENCOUNTER — OFFICE VISIT (OUTPATIENT)
Dept: FAMILY MEDICINE | Facility: CLINIC | Age: 40
End: 2020-09-18
Payer: COMMERCIAL

## 2020-09-18 ENCOUNTER — ANCILLARY PROCEDURE (OUTPATIENT)
Dept: GENERAL RADIOLOGY | Facility: CLINIC | Age: 40
End: 2020-09-18
Attending: FAMILY MEDICINE
Payer: COMMERCIAL

## 2020-09-18 VITALS
SYSTOLIC BLOOD PRESSURE: 120 MMHG | RESPIRATION RATE: 20 BRPM | HEIGHT: 70 IN | TEMPERATURE: 98.6 F | WEIGHT: 315 LBS | BODY MASS INDEX: 45.1 KG/M2 | DIASTOLIC BLOOD PRESSURE: 64 MMHG | HEART RATE: 92 BPM

## 2020-09-18 DIAGNOSIS — I10 HYPERTENSION GOAL BP (BLOOD PRESSURE) < 140/90: ICD-10-CM

## 2020-09-18 DIAGNOSIS — M25.471 ANKLE SWELLING, RIGHT: ICD-10-CM

## 2020-09-18 DIAGNOSIS — S99.911A ANKLE INJURY, RIGHT, INITIAL ENCOUNTER: Primary | ICD-10-CM

## 2020-09-18 DIAGNOSIS — K22.70 BARRETT'S ESOPHAGUS DETERMINED BY BIOPSY: ICD-10-CM

## 2020-09-18 PROCEDURE — 73610 X-RAY EXAM OF ANKLE: CPT | Mod: RT

## 2020-09-18 PROCEDURE — 99214 OFFICE O/P EST MOD 30 MIN: CPT | Performed by: FAMILY MEDICINE

## 2020-09-18 ASSESSMENT — ASTHMA QUESTIONNAIRES
QUESTION_5 LAST FOUR WEEKS HOW WOULD YOU RATE YOUR ASTHMA CONTROL: WELL CONTROLLED
ACT_TOTALSCORE: 24
ACUTE_EXACERBATION_TODAY: NO
QUESTION_2 LAST FOUR WEEKS HOW OFTEN HAVE YOU HAD SHORTNESS OF BREATH: NOT AT ALL
QUESTION_1 LAST FOUR WEEKS HOW MUCH OF THE TIME DID YOUR ASTHMA KEEP YOU FROM GETTING AS MUCH DONE AT WORK, SCHOOL OR AT HOME: NONE OF THE TIME
QUESTION_3 LAST FOUR WEEKS HOW OFTEN DID YOUR ASTHMA SYMPTOMS (WHEEZING, COUGHING, SHORTNESS OF BREATH, CHEST TIGHTNESS OR PAIN) WAKE YOU UP AT NIGHT OR EARLIER THAN USUAL IN THE MORNING: NOT AT ALL
QUESTION_4 LAST FOUR WEEKS HOW OFTEN HAVE YOU USED YOUR RESCUE INHALER OR NEBULIZER MEDICATION (SUCH AS ALBUTEROL): NOT AT ALL

## 2020-09-18 ASSESSMENT — MIFFLIN-ST. JEOR: SCORE: 2529.26

## 2020-09-18 NOTE — NURSING NOTE
"Chief Complaint   Patient presents with     Leg Swelling     Initial /64 (BP Location: Right arm, Patient Position: Sitting, Cuff Size: Adult Large)   Pulse 92   Temp 98.6  F (37  C) (Oral)   Resp 20   Ht 1.778 m (5' 10\")   Wt (!) 160.3 kg (353 lb 6.4 oz)   BMI 50.71 kg/m   Estimated body mass index is 50.71 kg/m  as calculated from the following:    Height as of this encounter: 1.778 m (5' 10\").    Weight as of this encounter: 160.3 kg (353 lb 6.4 oz).  BP completed using cuff size large right arm    Lisa Magill, CMA    "

## 2020-09-18 NOTE — NURSING NOTE
Pt's placed in walking boot on right side.  He was comfortable in boot and ambulating well out of the clinic.     Paperwork and instruction booklet given to pt.   Advised to call with any questions.     Eliana Concepcion RN

## 2020-09-18 NOTE — PROGRESS NOTES
"Subjective     Kimo Greenwood is a 39 year old male who presents to clinic today for the following health issues:    HPI       Musculoskeletal problem/pain  Onset/Duration: 3 weeks   Description  Location: leg - right  Joint Swelling: YES  Redness: no  Pain: YES  Warmth: no  Intensity:  severe, 7/10  Progression of Symptoms:  same  Accompanying signs and symptoms:   Fevers: no  Numbness/tingling/weakness: no  History  Trauma to the area: no  Recent illness:  no  Previous similar problem: YES  Previous evaluation:  no  Precipitating or alleviating factors:  Aggravating factors include: standing, walking, climbing stairs, lifting, exercise and overuse  Therapies tried and outcome: compression sock-helped a little bit.  ICE.     Patient hurt his ankle walking about 3 weeks ago with twisting his ankle.    Review of Systems   MUSCULOSKELETAL: as above      Objective    /64 (BP Location: Right arm, Patient Position: Sitting, Cuff Size: Adult Large)   Pulse 92   Temp 98.6  F (37  C) (Oral)   Resp 20   Ht 1.778 m (5' 10\")   Wt (!) 160.3 kg (353 lb 6.4 oz)   BMI 50.71 kg/m    Body mass index is 50.71 kg/m .  Physical Exam   GENERAL: healthy, alert and no distress  MS: right ankle swollen, mild erythema, tender at anterior/medial malleolus area, normal ROM, no calf swelling or tenderness    X-ray right ankle ordered and interpreted in the office today. X-ray shows:  question avulsion anterior tibia on lateral film not seen otherwise, normal mortise.  Radiology read pending.        Assessment & Plan     Ankle injury, right, initial encounter  Discussed possible ankle avulsion fracture (Tillaux type in adult) versus degenerative change with ankle sprain.  Discussed wearing walking boot and follow-up with sports medicine provider.    Ankle swelling, right  Follow-up if not improving.  - XR Ankle Right G/E 3 Views; Future  - Orthopedic & Spine  Referral; Future  - Ankle/Foot Bracing Supplies Order for DME " "- ONLY FOR DME    Patel's esophagus determined by biopsy  - REVIEW OF HEALTH MAINTENANCE PROTOCOL ORDERS; Future    Hypertension goal BP (blood pressure) < 140/90  - REVIEW OF HEALTH MAINTENANCE PROTOCOL ORDERS; Future     BMI:   Estimated body mass index is 50.71 kg/m  as calculated from the following:    Height as of this encounter: 1.778 m (5' 10\").    Weight as of this encounter: 160.3 kg (353 lb 6.4 oz).         Kory Hudson MD  [facility]    "

## 2020-09-19 ASSESSMENT — ASTHMA QUESTIONNAIRES: ACT_TOTALSCORE: 24

## 2020-09-23 ENCOUNTER — OFFICE VISIT (OUTPATIENT)
Dept: ORTHOPEDICS | Facility: CLINIC | Age: 40
End: 2020-09-23
Payer: COMMERCIAL

## 2020-09-23 VITALS
DIASTOLIC BLOOD PRESSURE: 70 MMHG | WEIGHT: 315 LBS | HEIGHT: 70 IN | BODY MASS INDEX: 45.1 KG/M2 | SYSTOLIC BLOOD PRESSURE: 126 MMHG

## 2020-09-23 DIAGNOSIS — S90.01XA CONTUSION OF RIGHT ANKLE, INITIAL ENCOUNTER: Primary | ICD-10-CM

## 2020-09-23 DIAGNOSIS — M25.471 ANKLE SWELLING, RIGHT: ICD-10-CM

## 2020-09-23 PROCEDURE — 99204 OFFICE O/P NEW MOD 45 MIN: CPT | Performed by: FAMILY MEDICINE

## 2020-09-23 RX ORDER — DICLOFENAC SODIUM 75 MG/1
75 TABLET, DELAYED RELEASE ORAL 2 TIMES DAILY PRN
Qty: 60 TABLET | Refills: 1 | Status: SHIPPED | OUTPATIENT
Start: 2020-09-23 | End: 2020-10-20

## 2020-09-23 ASSESSMENT — MIFFLIN-ST. JEOR: SCORE: 2527.45

## 2020-09-23 NOTE — PATIENT INSTRUCTIONS
1. Contusion of right ankle, initial encounter    2. Ankle swelling, right      -Patient has right ankle pain and swelling after a fall due to a severe contusion  -Patient will start diclofenac 75 mg twice a day as needed for pain and swelling  -Patient will start formal physical therapy and home exercise program.  -Patient may try over-the-counter compression sleeve if it provides some relief.  He can transition to cam walker boot if he can tolerate it  -Patient will follow up if pain does not improve  -Call direct clinic number [[phone]] at any time with questions or concerns.    Albert Yeo MD [facility]  [facility]

## 2020-09-23 NOTE — LETTER
9/23/2020         RE: Kimo Greenwood  [address] [place] [address]        Dear Colleague,    Thank you for referring your patient, Kimo Greenwood, to the [facility]. Please see a copy of my visit note below.    ASSESSMENT & PLAN  Patient Instructions     1. Contusion of right ankle, initial encounter    2. Ankle swelling, right      -Patient has right ankle pain and swelling after a fall due to a severe contusion  -Patient will start diclofenac 75 mg twice a day as needed for pain and swelling  -Patient will start formal physical therapy and home exercise program.  -Patient may try over-the-counter compression sleeve if it provides some relief.  He can transition to cam walker boot if he can tolerate it  -Patient will follow up if pain does not improve  -Call direct clinic number [[phone]] at any time with questions or concerns.    Albert Yeo MD [facility]  [facility]            -----    SUBJECTIVE  Kimo Greenwood is a/an 39 year old male who is seen in consultation at the request of  Kory Hudson M.D. for evaluation of right ankle pain. The patient is seen with their ISL . .    Onset: 3 1/2  week(s) ago. Patient describes injury as fell [place] hunting [place]. Foot was in a hole and was leaning on a tree when the tree fell over, and fell to his left side. Twisted his right ankle.   Location of Pain: right medial aspect of the ankle and back of ankle.   Rating of Pain at worst: 7/10  Rating of Pain Currently: 6/10  Worsened by: turning of the ankle, standing and walking. 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    With associated speech/language delay     Obesity 11/11/2010     LOREN (obstructive sleep apnea)     Uses a CPAP     Seborrheic dermatitis      Social History     Socioeconomic History     Marital status: Single     Spouse name: Not on file     Number of children: Not on file     Years of education: Not on file     Highest education level: Not on file   Occupational History     Not on file   Social Needs     Financial resource strain: Not on file     Food insecurity     Worry: Not on file     Inability: Not on file     Transportation needs     Medical: Not on file     Non-medical: Not on file   Tobacco Use     Smoking status: Former Smoker     Packs/day: 0.50     Years: 1.00     Pack years: 0.50     Types: Cigarettes     Smokeless tobacco: Never Used     Tobacco comment: Smokes E Cigs equal to 1 PPD   Substance and Sexual Activity     Alcohol use: Yes     Comment: socially--\"not very often\" \"once a month\"     Drug use: No     Comment: patient denies any marijuana use     Sexual activity: Yes     Partners: Female     Birth control/protection: Condom   Lifestyle     Physical activity     Days per week: Not on file     Minutes per session: Not on file     Stress: Not on file   Relationships     Social connections     Talks on phone: Not on file     Gets together: Not on file     Attends Taoism service: Not on file     Active member of club or organization: Not on file     Attends meetings of clubs or organizations: Not on file     Relationship status: Not on file     Intimate partner violence     Fear of current or ex partner: Not on file     Emotionally " "abused: Not on file     Physically abused: Not on file     Forced sexual activity: Not on file   Other Topics Concern     Parent/sibling w/ CABG, MI or angioplasty before 65F 55M? No   Social History Narrative     Not on file          Patient's past medical, surgical, social, and family histories were reviewed today and no changes are noted.    REVIEW OF SYSTEMS:  10 point ROS is negative other than symptoms noted above in HPI, Past Medical History or as stated below  Constitutional: NEGATIVE for fever, chills, change in weight  Skin: NEGATIVE for worrisome rashes, moles or lesions  GI/: NEGATIVE for bowel or bladder changes  Neuro: NEGATIVE for weakness, dizziness or paresthesias    OBJECTIVE:  /70   Ht 1.778 m (5' 10\")   Wt (!) 160.1 kg (353 lb)   BMI 50.65 kg/m     General: healthy, alert and in no distress  HEENT: no scleral icterus or conjunctival erythema  Skin: no suspicious lesions or rash. No jaundice.  CV:  no pedal edema  Resp: normal respiratory effort without conversational dyspnea   Psych: normal mood and affect  Gait: normal steady gait with appropriate coordination and balance  Neuro: Normal light sensory exam of lower extremity  MSK:  RIGHT ANKLE  Inspection:    No swelling or ecchymosis is observed  Palpation:    Tender about the medial malleolus and distal tibia. Remainder of bony and ligamentous landmarks are nontender.  Range of Motion:     Plantarflexion limited slightly by pain / dorsiflexion within normal limits / inversion within normal limits / eversion within normal limits  Strength:    within normal limits  Special Tests:    negative anterior drawer, negative talar tilt, negative valgus stress, negative forced external rotation/eversion, negative Bledsoe sign, negative squeeze test. Able to perform heel raise and Unable to hop.    RIGHT FOOT  Inspection:    no swelling or ecchymosis  Palpation:  bony/ligamentous landmarks are non-tender.  Range of Motion:     Grossly intact " and non-painful  Strength:    Grossly intact in all planes  Special Tests:    Positive: none      Independent visualization of the below image:  No results found for this or any previous visit (from the past 24 hour(s)).     ANKLE RIGHT THREE OR MORE VIEWS   9/18/2020 3:01 PM      HISTORY: Right ankle injury. Ankle swelling, right.     COMPARISON: None.                                                                      IMPRESSION: No evidence of acute fracture or subluxation. Mortise  appears intact. Possible mild early tibiotalar degenerative changes.  Mild talonavicular degenerative changes.     STEVEN C LINK, MD Albert Yeo MD [facility]      Again, thank you for allowing me to participate in the care of your patient.        Sincerely,        Albert Yeo, MD

## 2020-09-23 NOTE — PROGRESS NOTES
ASSESSMENT & PLAN  Patient Instructions     1. Contusion of right ankle, initial encounter    2. Ankle swelling, right      -Patient has right ankle pain and swelling after a fall due to a severe contusion  -Patient will start diclofenac 75 mg twice a day as needed for pain and swelling  -Patient will start formal physical therapy and home exercise program.  -Patient may try over-the-counter compression sleeve if it provides some relief.  He can transition to cam walker boot if he can tolerate it  -Patient will follow up if pain does not improve  -Call direct clinic number [[phone]] at any time with questions or concerns.    Albert Yeo MD [facility]  [facility]            -----    SUBJECTIVE  Kimolynda Greenwood is a/an 39 year old male who is seen in consultation at the request of  Kory Hudson M.D. for evaluation of right ankle pain. The patient is seen with their [facility] . .    Onset: 3 1/2  week(s) ago. Patient describes injury as [place]. Foot was in a hole and was leaning on a tree when the tree fell over, and fell to his left side. Twisted his right ankle.   Location of Pain: right medial aspect of the ankle and back of ankle.   Rating of Pain at worst: 7/10  Rating of Pain Currently: 6/10  Worsened by: turning of the ankle, standing and walking. 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    With associated speech/language delay     Obesity 11/11/2010     LOREN (obstructive sleep apnea)     Uses a CPAP     Seborrheic dermatitis      Social History     Socioeconomic History     Marital status: Single     Spouse name: Not on file     Number of children: Not on file     Years of education: Not on file     Highest education level: Not on file   Occupational History     Not on file   Social Needs     Financial resource strain: Not on file     Food insecurity     Worry: Not on file     Inability: Not on file     Transportation needs     Medical: Not on file     Non-medical: Not on file   Tobacco Use     Smoking status: Former Smoker     Packs/day: 0.50     Years: 1.00     Pack years: 0.50     Types: Cigarettes     Smokeless tobacco: Never Used     Tobacco comment: Smokes E Cigs equal to 1 PPD   Substance and Sexual Activity     Alcohol use: Yes     Comment: socially--\"not very often\" \"once a month\"     Drug use: No     Comment: patient denies any marijuana use     Sexual activity: Yes     Partners: Female     Birth control/protection: Condom   Lifestyle     Physical activity     Days per week: Not on file     Minutes per session: Not on file     Stress: Not on file   Relationships     Social connections     Talks on phone: Not on file     Gets together: Not on file     Attends Protestant service: Not on file     Active member of club or organization: Not on file     Attends meetings of clubs or organizations: Not on file     Relationship status: Not on file     Intimate partner violence     Fear of current or ex partner: Not on file     Emotionally abused: Not on file     Physically abused: Not on file     Forced sexual activity: Not on file   Other Topics Concern     Parent/sibling w/ CABG, MI or angioplasty before 65F 55M? No   Social History Narrative     Not on file          Patient's past medical, surgical, " "social, and family histories were reviewed today and no changes are noted.    REVIEW OF SYSTEMS:  10 point ROS is negative other than symptoms noted above in HPI, Past Medical History or as stated below  Constitutional: NEGATIVE for fever, chills, change in weight  Skin: NEGATIVE for worrisome rashes, moles or lesions  GI/: NEGATIVE for bowel or bladder changes  Neuro: NEGATIVE for weakness, dizziness or paresthesias    OBJECTIVE:  /70   Ht 1.778 m (5' 10\")   Wt (!) 160.1 kg (353 lb)   BMI 50.65 kg/m     General: healthy, alert and in no distress  HEENT: no scleral icterus or conjunctival erythema  Skin: no suspicious lesions or rash. No jaundice.  CV:  no pedal edema  Resp: normal respiratory effort without conversational dyspnea   Psych: normal mood and affect  Gait: normal steady gait with appropriate coordination and balance  Neuro: Normal light sensory exam of lower extremity  MSK:  RIGHT ANKLE  Inspection:    No swelling or ecchymosis is observed  Palpation:    Tender about the medial malleolus and distal tibia. Remainder of bony and ligamentous landmarks are nontender.  Range of Motion:     Plantarflexion limited slightly by pain / dorsiflexion within normal limits / inversion within normal limits / eversion within normal limits  Strength:    within normal limits  Special Tests:    negative anterior drawer, negative talar tilt, negative valgus stress, negative forced external rotation/eversion, negative Bledsoe sign, negative squeeze test. Able to perform heel raise and Unable to hop.    RIGHT FOOT  Inspection:    no swelling or ecchymosis  Palpation:  bony/ligamentous landmarks are non-tender.  Range of Motion:     Grossly intact and non-painful  Strength:    Grossly intact in all planes  Special Tests:    Positive: none      Independent visualization of the below image:  No results found for this or any previous visit (from the past 24 hour(s)).     ANKLE RIGHT THREE OR MORE VIEWS   9/18/2020 " 3:01 PM      HISTORY: Right ankle injury. Ankle swelling, right.     COMPARISON: None.                                                                      IMPRESSION: No evidence of acute fracture or subluxation. Mortise  appears intact. Possible mild early tibiotalar degenerative changes.  Mild talonavicular degenerative changes.     STEVEN C LINK, MD Albert Yeo MD [facility]

## 2020-09-25 ENCOUNTER — HOSPITAL ENCOUNTER (EMERGENCY)
Facility: CLINIC | Age: 40
Discharge: HOME | End: 2020-09-25
Attending: PHYSICIAN ASSISTANT
Payer: COMMERCIAL

## 2020-09-25 VITALS
TEMPERATURE: 98.3 F | DIASTOLIC BLOOD PRESSURE: 78 MMHG | SYSTOLIC BLOOD PRESSURE: 141 MMHG | HEART RATE: 86 BPM | OXYGEN SATURATION: 100 % | RESPIRATION RATE: 18 BRPM

## 2020-09-25 NOTE — ED NOTES
Patient not in the room at time of provider assessment.  Patient gown is lying on the bed, patient assumed to have Left without being seen.

## 2020-09-25 NOTE — ED TRIAGE NOTES
"Patient presents with right hip pain since this morning with shortness of breath. He was walking and felt his right hip \"pop\", and is now having cracking and clicking.  "

## 2020-09-28 ENCOUNTER — TELEPHONE (OUTPATIENT)
Dept: FAMILY MEDICINE | Facility: CLINIC | Age: 40
End: 2020-09-28

## 2020-09-28 NOTE — TELEPHONE ENCOUNTER
Pt was in ED on 9/25 appears he left without being seen .     LM for call back     Eliana Concepcion RN

## 2020-09-30 DIAGNOSIS — Z11.59 ENCOUNTER FOR SCREENING FOR OTHER VIRAL DISEASES: Primary | ICD-10-CM

## 2020-09-30 PROBLEM — K29.80 DUODENITIS: Status: ACTIVE | Noted: 2020-09-30

## 2020-10-02 DIAGNOSIS — E55.9 VITAMIN D DEFICIENCY: Primary | ICD-10-CM

## 2020-10-02 RX ORDER — CHOLECALCIFEROL (VITAMIN D3) 50 MCG
TABLET ORAL
Qty: 28 TABLET | Refills: 11 | Status: SHIPPED | OUTPATIENT
Start: 2020-10-02 | End: 2021-08-19

## 2020-10-02 NOTE — TELEPHONE ENCOUNTER
Prescription approved per [facility] Refill Protocol.  Anupama Ross RN, BSN

## 2020-10-13 ENCOUNTER — TELEPHONE (OUTPATIENT)
Dept: FAMILY MEDICINE | Facility: CLINIC | Age: 40
End: 2020-10-13

## 2020-10-13 DIAGNOSIS — S73.005S DISLOCATION OF LEFT HIP, SEQUELA: Primary | ICD-10-CM

## 2020-10-13 NOTE — TELEPHONE ENCOUNTER
Patient calls w/ Yolanda (Caregiver)    Patient moved and raised toilet seat does not fit current toilet, raised toilet seat order needs to be sent to [facility], pended order      Cachorro Cintron RN

## 2020-10-19 ENCOUNTER — TRANSFERRED RECORDS (OUTPATIENT)
Dept: HEALTH INFORMATION MANAGEMENT | Facility: CLINIC | Age: 40
End: 2020-10-19

## 2020-10-23 DIAGNOSIS — Z11.59 ENCOUNTER FOR SCREENING FOR OTHER VIRAL DISEASES: Primary | ICD-10-CM

## 2020-10-24 DIAGNOSIS — Z11.59 ENCOUNTER FOR SCREENING FOR OTHER VIRAL DISEASES: ICD-10-CM

## 2020-10-24 PROCEDURE — U0003 INFECTIOUS AGENT DETECTION BY NUCLEIC ACID (DNA OR RNA); SEVERE ACUTE RESPIRATORY SYNDROME CORONAVIRUS 2 (SARS-COV-2) (CORONAVIRUS DISEASE [COVID-19]), AMPLIFIED PROBE TECHNIQUE, MAKING USE OF HIGH THROUGHPUT TECHNOLOGIES AS DESCRIBED BY CMS-2020-01-R: HCPCS | Performed by: INTERNAL MEDICINE

## 2020-10-25 LAB
SARS-COV-2 RNA SPEC QL NAA+PROBE: NOT DETECTED
SPECIMEN SOURCE: NORMAL

## 2020-10-26 ENCOUNTER — OFFICE VISIT (OUTPATIENT)
Dept: FAMILY MEDICINE | Facility: CLINIC | Age: 40
End: 2020-10-26
Payer: COMMERCIAL

## 2020-10-26 VITALS
RESPIRATION RATE: 20 BRPM | DIASTOLIC BLOOD PRESSURE: 80 MMHG | HEART RATE: 78 BPM | TEMPERATURE: 97.6 F | BODY MASS INDEX: 45.1 KG/M2 | SYSTOLIC BLOOD PRESSURE: 120 MMHG | WEIGHT: 315 LBS | HEIGHT: 70 IN | OXYGEN SATURATION: 96 %

## 2020-10-26 DIAGNOSIS — F32.1 MODERATE MAJOR DEPRESSION (H): ICD-10-CM

## 2020-10-26 DIAGNOSIS — G47.33 OSA (OBSTRUCTIVE SLEEP APNEA): ICD-10-CM

## 2020-10-26 DIAGNOSIS — K21.9 LPRD (LARYNGOPHARYNGEAL REFLUX DISEASE): ICD-10-CM

## 2020-10-26 DIAGNOSIS — Z01.818 PREOP GENERAL PHYSICAL EXAM: Primary | ICD-10-CM

## 2020-10-26 LAB
ERYTHROCYTE [DISTWIDTH] IN BLOOD BY AUTOMATED COUNT: 13.4 % (ref 10–15)
HCT VFR BLD AUTO: 40.6 % (ref 40–53)
HGB BLD-MCNC: 13.4 G/DL (ref 13.3–17.7)
MCH RBC QN AUTO: 28.3 PG (ref 26.5–33)
MCHC RBC AUTO-ENTMCNC: 33 G/DL (ref 31.5–36.5)
MCV RBC AUTO: 86 FL (ref 78–100)
PLATELET # BLD AUTO: 255 10E9/L (ref 150–450)
RBC # BLD AUTO: 4.74 10E12/L (ref 4.4–5.9)
WBC # BLD AUTO: 8.4 10E9/L (ref 4–11)

## 2020-10-26 PROCEDURE — 99215 OFFICE O/P EST HI 40 MIN: CPT | Performed by: FAMILY MEDICINE

## 2020-10-26 PROCEDURE — 36415 COLL VENOUS BLD VENIPUNCTURE: CPT | Performed by: FAMILY MEDICINE

## 2020-10-26 PROCEDURE — 85027 COMPLETE CBC AUTOMATED: CPT | Performed by: FAMILY MEDICINE

## 2020-10-26 PROCEDURE — 80048 BASIC METABOLIC PNL TOTAL CA: CPT | Performed by: FAMILY MEDICINE

## 2020-10-26 RX ORDER — ARIPIPRAZOLE 10 MG/1
TABLET ORAL
COMMUNITY
Start: 2020-10-20 | End: 2021-05-14

## 2020-10-26 ASSESSMENT — ENCOUNTER SYMPTOMS
DYSURIA: 0
SHORTNESS OF BREATH: 0
NERVOUS/ANXIOUS: 0
VOMITING: 0
PALPITATIONS: 0
EYE PAIN: 0
BACK PAIN: 0
CHILLS: 0
FEVER: 0
COUGH: 0
SEIZURES: 0
ABDOMINAL PAIN: 0
SORE THROAT: 0
HEMATURIA: 0
COLOR CHANGE: 0
ARTHRALGIAS: 0

## 2020-10-26 ASSESSMENT — MIFFLIN-ST. JEOR: SCORE: 2585.51

## 2020-10-26 NOTE — PROGRESS NOTES
[facility]  [address] [address]  Phone: [phone]  Primary Provider: Kory Husdon  Pre-op Performing Provider: DAR TYLER    PREOPERATIVE EVALUATION:  Today's date: 10/26/2020    Kimo Greenwood is a 39 year old male who presents for a preoperative evaluation.    Surgical Information:  Surgery/Procedure: colonoscopy/upper endoscopy  Surgery Location: [facility]  Surgeon: Efra  Surgery Date: 10/28/2020  Time of Surgery: 10:30  Where patient plans to recover: At home with family  Fax number for surgical facility: Note does not need to be faxed, will be available electronically in Epic.    Type of Anesthesia Anticipated: Local    Subjective     HPI related to upcoming procedure: Patient is a 39-year-old male who is having preoperative visit for an upper and lower endoscopy.       Preop Questions 1/3/2020   1. Have you ever had a heart attack or stroke? No   3. Do you have chest pain with activity? No   4. Do you have a history of  heart failure? No   5. Do you currently have a cold, bronchitis or symptoms of other infection? No   6. Do you have a cough, shortness of breath, or wheezing? No   7. Do you or anyone in your family have previous history of blood clots? No   8. Do you or does anyone in your family have a serious bleeding problem such as prolonged bleeding following surgeries or cuts? No   9. Have you ever had problems with anemia or been told to take iron pills? No   10. Have you had any abnormal blood loss such as black, tarry or bloody stools? No   11. Have you ever had a blood transfusion? No   13. Have you or any of your relatives ever had problems with anesthesia? No   14. Do you have sleep apnea, excessive snoring or daytime drowsiness? YES - CPAP   15. Do you have any artifical heart valves or other implanted medical devices like a pacemaker, defibrillator, or continuous glucose monitor? No   16. Do you have artificial joints? YES -  s/p total left hip replacement.      Health Care Directive:  Patient does not have a Health Care Directive or Living Will: Discussed advance care planning with patient; information given to patient to review.    Preoperative Review of :   reviewed - no record of controlled substances prescribed.      History of morbid obesity, BMI 51.     HYPERTENSION - Patient has longstanding history of HTN , currently denies any symptoms referable to elevated blood pressure. Specifically denies chest pain, racing heartbeat or shortness of breath. Blood pressure readings have been in normal range. Current medication regimen is as listed below. Patient denies any side effects of medication.      See problem list for active medical problems.  Problems all longstanding and stable, except as noted/documented.  See ROS for pertinent symptoms related to these conditions.    Review of Systems   Constitutional: Negative for chills and fever.   HENT: Negative for ear pain and sore throat.    Eyes: Negative for pain and visual disturbance.   Respiratory: Negative for cough and shortness of breath.    Cardiovascular: Negative for chest pain and palpitations.   Gastrointestinal: Negative for abdominal pain and vomiting.   Genitourinary: Negative for dysuria and hematuria.   Musculoskeletal: Negative for arthralgias and back pain.   Skin: Negative for color change and rash.   Neurological: Negative for seizures and syncope.   Psychiatric/Behavioral: The patient is not nervous/anxious.    All other systems reviewed and are negative.    Patient Active Problem List    Diagnosis Date Noted     Duodenitis 09/30/2020     Priority: Medium     Added automatically from request for surgery [id number]       Somatic dysfunction of lumbar region 10/13/2019     Priority: Medium     Somatic dysfunction of sacral region 10/13/2019     Priority: Medium     Sacral pain 10/13/2019     Priority: Medium     Thoracic segment dysfunction 10/13/2019     Priority:  Medium     Hip pain, left 10/13/2019     Priority: Medium     Mild intermittent asthma 05/08/2017     Priority: Medium     Patel's esophagus determined by biopsy 03/27/2017     Priority: Medium     Next upper GI endoscopy (EGD) 11/2019       Vitamin D deficiency 03/20/2017     Priority: Medium     LPRD (laryngopharyngeal reflux disease) 03/20/2017     Priority: Medium     S/P total hip arthroplasty 01/25/2017     Priority: Medium     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     Priority: Medium     History of colonic polyps 09/28/2016     Priority: Medium     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 None 11/2016. Next colonoscopy 11/2021 if not sooner.       Bilateral low back pain with left-sided sciatica 10/08/2015     Priority: Medium     Chronic low back pain 10/06/2015     Priority: Medium     Suicidal ideation 08/23/2015     Priority: Medium     Leukocytosis 06/09/2014     Priority: Medium     Morbid obesity (H) 05/20/2014     Priority: Medium     Hypertension goal BP (blood pressure) < 140/90 02/03/2014     Priority: Medium     LOREN (obstructive sleep apnea) 04/23/2013     Priority: Medium     CPAP       Moderate major depression (H) 01/15/2013     Priority: Medium     Speech/language delay 11/11/2010     Priority: Medium     Tobacco use disorder 11/11/2010     Priority: Medium     Nocturnal enuresis      Priority: Medium      Past Medical History:   Diagnosis Date     Arthritis     DDD hips and knees     Asthma      Chronic pain - left hip/leg pain     degenerative disc disease, back, hips, knees     Gastroesophageal reflux disease      Hypertension      Marijuana abuse      MDD (major depressive disorder)      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    With associated speech/language delay     Obesity 11/11/2010     LOREN (obstructive sleep apnea)     Uses a CPAP     Seborrheic dermatitis      Past Surgical History:   Procedure Laterality Date     ARTHROPLASTY HIP Left 1/25/2017     Procedure: ARTHROPLASTY HIP;  Surgeon: Bala Randall MD;  Location: [facility] OR     ARTHROPLASTY REVISION HIP Left 6/24/2019    Procedure: Revision left total hip arthroplasty with a head and liner exchange to a Biomet dual mobility head and liner.;  Surgeon: Bala Randall MD;  Location:  OR     BLADDER SURGERY      [facility], [facility],Interstem stimulator implant     CLOSED REDUCTION HIP Left 6/10/2019    Procedure: Closed reduction under general anesthesia left recurrent prosthetic hip dislocation;  Surgeon: Rafat Cazares MD;  Location:  OR     COLONOSCOPY N/A 10/30/2015    Procedure: COMBINED COLONOSCOPY, SINGLE OR MULTIPLE BIOPSY/POLYPECTOMY BY BIOPSY;  Surgeon: Alexis Jackson MD;  Location:  GI     COLONOSCOPY N/A 11/17/2016    Procedure: COMBINED COLONOSCOPY, SINGLE OR MULTIPLE BIOPSY/POLYPECTOMY BY BIOPSY;  Surgeon: Cat Huber MD;  Location:  GI     ESOPHAGOSCOPY, GASTROSCOPY, DUODENOSCOPY (EGD), COMBINED N/A 11/17/2016    Procedure: COMBINED ESOPHAGOSCOPY, GASTROSCOPY, DUODENOSCOPY (EGD), BIOPSY SINGLE OR MULTIPLE;  Surgeon: Cat Huber MD;  Location:  GI     ESOPHAGOSCOPY, GASTROSCOPY, DUODENOSCOPY (EGD), COMBINED N/A 3/12/2020    Procedure: ESOPHAGOGASTRODUODENOSCOPY WITH BIOPSIES;  Surgeon: You Kraus MD;  Location:  OR     EXAM UNDER ANESTHESIA, FULGURATE CONDYLOMA ANUS, COMBINED N/A 12/22/2016    Procedure: COMBINED EXAM UNDER ANESTHESIA, FULGURATE CONDYLOMA ANUS;  Surgeon: Nya Cabello MD;  Location:  OR     GENITOURINARY SURGERY       Current Outpatient Medications   Medication Sig Dispense Refill     albuterol (VENTOLIN HFA) 108 (90 Base) MCG/ACT inhaler INHALE 2 PUFFS INTO LUNGS EVERY SIX HOURS AS NEEDED FOR SHORTNESS OF BREATH / DYSPNEA OR WHEEZING 1 Inhaler 11     ARIPiprazole (ABILIFY) 10 MG tablet        ARIPiprazole (ABILIFY) 5 MG tablet Take 1.5 tablets (7.5 mg) by mouth every morning (Patient taking  differently: Take 15 mg by mouth every morning ) 45 tablet 1     betamethasone dipropionate (DIPROSONE) 0.05 % external ointment Apply topically 2 times daily Apply twice daily up to 2 weeks for rash.       Calcium Polycarbophil (FIBER) 625 MG tablet TAKE 2 TABLETS BY MOUTH TWICE A  tablet 10     Cholecalciferol (VITAMIN D) 50 MCG (2000 UT) CAPS TAKE 1 CAPSULE BY MOUTH DAILY 28 capsule 5     hydrochlorothiazide (HYDRODIURIL) 12.5 MG tablet TAKE 1 TABLET (12.5MG) BY MOUTH DAILY 90 tablet 3     hydrocortisone 2.5 % cream Apply topically 2 times daily Apply sparing to face for 1-2 weeks. 30 g 2     ketoconazole (NIZORAL) 2 % external shampoo APPLY TO SCALP & BEARD TWICE WEEKLY  WASH OFF AFTER 5 MINUTES 120 mL 8     lisinopril (ZESTRIL) 10 MG tablet TAKE 1 TABLET BY MOUTH EVERY MORNING 30 tablet 6     naproxen sodium 220 MG capsule If Tylenol is not helpful, this can be added 2 tabs twice a day with meals.       ondansetron (ZOFRAN-ODT) 4 MG ODT tab        order for DME Equipment being ordered: CPAP supplies 1 each 0     order for DME Shoe horn with extended handle  [employer]  Phone: [phone]  Fax: [phone] Device 0     oxybutynin ER (DITROPAN XL) 15 MG 24 hr tablet Take 30 mg by mouth daily       pantoprazole (PROTONIX) 40 MG EC tablet TAKE 1 TABLET BY MOUTH 30-60 MINUTES BEFORE FIRST MEAL OF THE DAY 28 tablet 5     prazosin (MINIPRESS) 1 MG capsule Take 3 mg by mouth At Bedtime       vitamin D3 (CHOLECALCIFEROL) 50 mcg (2000 units) tablet TAKE 1 TABLET BY MOUTH DAILY 28 tablet 11       Allergies   Allergen Reactions     No Clinical Screening - See Comments Other (See Comments)     Awoke from anesthesia with anger and ripping off dressing, after interstim placement, outside hospital 2013        Social History     Tobacco Use     Smoking status: Current Every Day Smoker     Packs/day: 1.00     Years: 1.00     Pack years: 1.00     Types: Cigarettes     Smokeless tobacco: Never Used     Tobacco  "comment: Smokes E Cigs equal to 1 PPD   Substance Use Topics     Alcohol use: Yes     Comment: socially--\"not very often\" \"once a month\"       History   Drug Use No     Comment: patient denies any marijuana use         Objective     /80 (BP Location: Right arm, Patient Position: Chair, Cuff Size: Adult Large)   Pulse 78   Temp 97.6  F (36.4  C) (Oral)   Resp 20   Ht 1.778 m (5' 10\")   Wt (!) 165.9 kg (365 lb 12.8 oz)   SpO2 96%   BMI 52.49 kg/m      Physical Exam  Vitals signs reviewed.   Constitutional:       General: He is not in acute distress.     Appearance: Normal appearance. He is not ill-appearing.   HENT:      Head: Normocephalic and atraumatic.      Right Ear: External ear normal.      Left Ear: External ear normal.      Nose: Nose normal.      Mouth/Throat:      Mouth: Mucous membranes are moist.      Pharynx: Oropharynx is clear.   Eyes:      General: No scleral icterus.        Right eye: No discharge.         Left eye: No discharge.      Extraocular Movements: Extraocular movements intact.      Pupils: Pupils are equal, round, and reactive to light.   Neck:      Musculoskeletal: Normal range of motion.      Vascular: No JVD.   Cardiovascular:      Rate and Rhythm: Normal rate and regular rhythm.   Pulmonary:      Effort: Pulmonary effort is normal. No respiratory distress.      Breath sounds: Normal breath sounds.   Abdominal:      General: Abdomen is flat. Bowel sounds are normal.      Palpations: Abdomen is soft.   Musculoskeletal:         General: No swelling.      Right lower leg: Edema present.      Left lower leg: Edema present.   Lymphadenopathy:      Cervical: No cervical adenopathy.   Skin:     General: Skin is warm.      Capillary Refill: Capillary refill takes less than 2 seconds.   Neurological:      General: No focal deficit present.      Mental Status: He is alert.   Psychiatric:         Mood and Affect: Mood normal.         Behavior: Behavior normal.     CBC RESULTS:   Recent " Labs   Lab Test 10/26/20  1055   WBC 8.4   RBC 4.74   HGB 13.4   HCT 40.6   MCV 86   MCH 28.3   MCHC 33.0   RDW 13.4        Last Comprehensive Metabolic Panel:  Sodium   Date Value Ref Range Status   10/26/2020 139 133 - 144 mmol/L Final     Potassium   Date Value Ref Range Status   10/26/2020 4.3 3.4 - 5.3 mmol/L Final     Chloride   Date Value Ref Range Status   10/26/2020 104 94 - 109 mmol/L Final     Carbon Dioxide   Date Value Ref Range Status   10/26/2020 32 20 - 32 mmol/L Final     Anion Gap   Date Value Ref Range Status   10/26/2020 3 3 - 14 mmol/L Final     Glucose   Date Value Ref Range Status   10/26/2020 89 70 - 99 mg/dL Final     Urea Nitrogen   Date Value Ref Range Status   10/26/2020 12 7 - 30 mg/dL Final     Creatinine   Date Value Ref Range Status   10/26/2020 0.86 0.66 - 1.25 mg/dL Final     GFR Estimate   Date Value Ref Range Status   10/26/2020 >90 >60 mL/min/[1.73_m2] Final     Comment:     Non  GFR Calc  Starting 12/18/2018, serum creatinine based estimated GFR (eGFR) will be   calculated using the Chronic Kidney Disease Epidemiology Collaboration   (CKD-EPI) equation.       Calcium   Date Value Ref Range Status   10/26/2020 9.5 8.5 - 10.1 mg/dL Final     Bilirubin Total   Date Value Ref Range Status   08/27/2019 0.3 0.2 - 1.3 mg/dL Final     Alkaline Phosphatase   Date Value Ref Range Status   08/27/2019 77 40 - 150 U/L Final     ALT   Date Value Ref Range Status   08/27/2019 27 0 - 70 U/L Final     AST   Date Value Ref Range Status   08/27/2019 14 0 - 45 U/L Final           Recent Labs   Lab Test 03/12/20  0820 12/24/19  1033 08/27/19  2050 06/25/19  0639 06/24/19  1151   HGB  --   --  13.9 11.3*  --    PLT  --   --  275  --  245   NA  --   --  139 140  --    POTASSIUM 4.4  --  3.8 4.2  --    CR 0.85  --  0.86 0.86 0.90   A1C  --  5.1  --   --   --         Diagnostics:  Labs pending at this time.  Results will be reviewed when available.   No EKG required, no history of  coronary heart disease, significant arrhythmia, peripheral arterial disease or other structural heart disease.    Revised Cardiac Risk Index (RCRI):  The patient has the following serious cardiovascular risks for perioperative complications:   - No serious cardiac risks = 0 points     RCRI Interpretation: 0 points: Class I (very low risk - 0.4% complication rate)           Assessment & Plan   The proposed surgical procedure is considered INTERMEDIATE risk.    Preop general physical exam  - CBC with platelets  - Basic metabolic panel  (Ca, Cl, CO2, Creat, Gluc, K, Na, BUN)    LPRD (laryngopharyngeal reflux disease)    LOREN (obstructive sleep apnea)    Moderate major depression (H)      Risks and Recommendations:  The patient has the following additional risks and recommendations for perioperative complications:   - Morbid obesity (BMI >40)   - Recurrent use of steroids    Medication Instructions:  Patient is to take all scheduled medications on the day of surgery EXCEPT for modifications listed below:   - naproxen (Aleve, Naprosyn): HOLD 4 days before surgery. To decrease risk of perioperative bleeding.     Recommend he continues his lisinopril on the day of surgery due to risk of severe uncontrolled blood pressure.     RECOMMENDATION:  APPROVAL GIVEN to proceed with proposed procedure    Signed Electronically by: Ciro Love MD    Copy of this evaluation report is provided to requesting physician.    [facility] Pre[facility] Guidelines    Revised Cardiac Risk Index

## 2020-10-26 NOTE — PATIENT INSTRUCTIONS

## 2020-10-27 LAB
ANION GAP SERPL CALCULATED.3IONS-SCNC: 3 MMOL/L (ref 3–14)
BUN SERPL-MCNC: 12 MG/DL (ref 7–30)
CALCIUM SERPL-MCNC: 9.5 MG/DL (ref 8.5–10.1)
CHLORIDE SERPL-SCNC: 104 MMOL/L (ref 94–109)
CO2 SERPL-SCNC: 32 MMOL/L (ref 20–32)
CREAT SERPL-MCNC: 0.86 MG/DL (ref 0.66–1.25)
GFR SERPL CREATININE-BSD FRML MDRD: >90 ML/MIN/{1.73_M2}
GLUCOSE SERPL-MCNC: 89 MG/DL (ref 70–99)
POTASSIUM SERPL-SCNC: 4.3 MMOL/L (ref 3.4–5.3)
SODIUM SERPL-SCNC: 139 MMOL/L (ref 133–144)

## 2020-10-28 ENCOUNTER — ANESTHESIA (OUTPATIENT)
Dept: SURGERY | Facility: CLINIC | Age: 40
End: 2020-10-28
Payer: COMMERCIAL

## 2020-10-28 ENCOUNTER — HOSPITAL ENCOUNTER (OUTPATIENT)
Facility: CLINIC | Age: 40
Discharge: HOME | End: 2020-10-28
Attending: INTERNAL MEDICINE | Admitting: INTERNAL MEDICINE
Payer: COMMERCIAL

## 2020-10-28 ENCOUNTER — ANESTHESIA EVENT (OUTPATIENT)
Dept: SURGERY | Facility: CLINIC | Age: 40
End: 2020-10-28
Payer: COMMERCIAL

## 2020-10-28 VITALS
RESPIRATION RATE: 20 BRPM | HEIGHT: 70 IN | HEART RATE: 73 BPM | BODY MASS INDEX: 45.1 KG/M2 | WEIGHT: 315 LBS | SYSTOLIC BLOOD PRESSURE: 122 MMHG | OXYGEN SATURATION: 100 % | DIASTOLIC BLOOD PRESSURE: 79 MMHG | TEMPERATURE: 97.2 F

## 2020-10-28 DIAGNOSIS — K22.70 BARRETT'S ESOPHAGUS DETERMINED BY BIOPSY: ICD-10-CM

## 2020-10-28 DIAGNOSIS — K29.80 DUODENITIS: ICD-10-CM

## 2020-10-28 LAB
COLONOSCOPY: NORMAL
INTERPRETATION ECG - MUSE: NORMAL
UPPER GI ENDOSCOPY: NORMAL

## 2020-10-28 PROCEDURE — 999N000036 HC STATISTIC COLONOSCOPY (OR PROCEDURE): Performed by: INTERNAL MEDICINE

## 2020-10-28 PROCEDURE — 88305 TISSUE EXAM BY PATHOLOGIST: CPT | Mod: TC | Performed by: INTERNAL MEDICINE

## 2020-10-28 PROCEDURE — 250N000009 HC RX 250: Performed by: NURSE ANESTHETIST, CERTIFIED REGISTERED

## 2020-10-28 PROCEDURE — 258N000003 HC RX IP 258 OP 636: Performed by: ANESTHESIOLOGY

## 2020-10-28 PROCEDURE — 93010 ELECTROCARDIOGRAM REPORT: CPT | Performed by: INTERNAL MEDICINE

## 2020-10-28 PROCEDURE — 999N000053 HC STATISTIC EGD (OR PROCEDURE): Performed by: INTERNAL MEDICINE

## 2020-10-28 PROCEDURE — 761N000007 HC RECOVERY PHASE 2 EACH 15 MINS: Performed by: INTERNAL MEDICINE

## 2020-10-28 PROCEDURE — 250N000011 HC RX IP 250 OP 636: Performed by: NURSE ANESTHETIST, CERTIFIED REGISTERED

## 2020-10-28 PROCEDURE — 370N000001 HC ANESTHESIA TECHNICAL FEE, 1ST 30 MIN: Performed by: INTERNAL MEDICINE

## 2020-10-28 PROCEDURE — 360N000014 HC SURGERY LEVEL 2 1ST 30 MIN: Performed by: INTERNAL MEDICINE

## 2020-10-28 PROCEDURE — 272N000001 HC OR GENERAL SUPPLY STERILE: Performed by: INTERNAL MEDICINE

## 2020-10-28 PROCEDURE — 360N000015 HC SURGERY LEVEL 2 EA 15 ADDTL MIN: Performed by: INTERNAL MEDICINE

## 2020-10-28 PROCEDURE — 999N000136 HC STATISTIC PRE PROC ASSESS II: Performed by: INTERNAL MEDICINE

## 2020-10-28 PROCEDURE — 370N000002 HC ANESTHESIA TECHNICAL FEE, EACH ADDTL 15 MIN: Performed by: INTERNAL MEDICINE

## 2020-10-28 PROCEDURE — 88305 TISSUE EXAM BY PATHOLOGIST: CPT | Mod: 26 | Performed by: PATHOLOGY

## 2020-10-28 RX ORDER — PROCHLORPERAZINE MALEATE 10 MG
10 TABLET ORAL EVERY 6 HOURS PRN
Status: CANCELLED | OUTPATIENT
Start: 2020-10-28

## 2020-10-28 RX ORDER — FENTANYL CITRATE 50 UG/ML
25-50 INJECTION, SOLUTION INTRAMUSCULAR; INTRAVENOUS
Status: CANCELLED | OUTPATIENT
Start: 2020-10-28

## 2020-10-28 RX ORDER — ONDANSETRON 2 MG/ML
4 INJECTION INTRAMUSCULAR; INTRAVENOUS EVERY 30 MIN PRN
Status: CANCELLED | OUTPATIENT
Start: 2020-10-28

## 2020-10-28 RX ORDER — LABETALOL HYDROCHLORIDE 5 MG/ML
10 INJECTION, SOLUTION INTRAVENOUS
Status: CANCELLED | OUTPATIENT
Start: 2020-10-28

## 2020-10-28 RX ORDER — KETAMINE HYDROCHLORIDE 10 MG/ML
INJECTION INTRAMUSCULAR; INTRAVENOUS PRN
Status: DISCONTINUED | OUTPATIENT
Start: 2020-10-28 | End: 2020-10-28

## 2020-10-28 RX ORDER — HYDRALAZINE HYDROCHLORIDE 20 MG/ML
2.5-5 INJECTION INTRAMUSCULAR; INTRAVENOUS EVERY 10 MIN PRN
Status: CANCELLED | OUTPATIENT
Start: 2020-10-28

## 2020-10-28 RX ORDER — ONDANSETRON 2 MG/ML
4 INJECTION INTRAMUSCULAR; INTRAVENOUS
Status: DISCONTINUED | OUTPATIENT
Start: 2020-10-28 | End: 2020-10-28 | Stop reason: HOSPADM

## 2020-10-28 RX ORDER — LIDOCAINE 40 MG/G
CREAM TOPICAL
Status: DISCONTINUED | OUTPATIENT
Start: 2020-10-28 | End: 2020-10-28 | Stop reason: HOSPADM

## 2020-10-28 RX ORDER — GLYCOPYRROLATE 0.2 MG/ML
INJECTION, SOLUTION INTRAMUSCULAR; INTRAVENOUS PRN
Status: DISCONTINUED | OUTPATIENT
Start: 2020-10-28 | End: 2020-10-28

## 2020-10-28 RX ORDER — NALOXONE HYDROCHLORIDE 0.4 MG/ML
.1-.4 INJECTION, SOLUTION INTRAMUSCULAR; INTRAVENOUS; SUBCUTANEOUS
Status: CANCELLED | OUTPATIENT
Start: 2020-10-28 | End: 2020-10-29

## 2020-10-28 RX ORDER — HYDROMORPHONE HYDROCHLORIDE 1 MG/ML
.3-.5 INJECTION, SOLUTION INTRAMUSCULAR; INTRAVENOUS; SUBCUTANEOUS EVERY 10 MIN PRN
Status: CANCELLED | OUTPATIENT
Start: 2020-10-28

## 2020-10-28 RX ORDER — ONDANSETRON 4 MG/1
4 TABLET, ORALLY DISINTEGRATING ORAL EVERY 30 MIN PRN
Status: CANCELLED | OUTPATIENT
Start: 2020-10-28

## 2020-10-28 RX ORDER — SODIUM CHLORIDE, SODIUM LACTATE, POTASSIUM CHLORIDE, CALCIUM CHLORIDE 600; 310; 30; 20 MG/100ML; MG/100ML; MG/100ML; MG/100ML
INJECTION, SOLUTION INTRAVENOUS CONTINUOUS
Status: CANCELLED | OUTPATIENT
Start: 2020-10-28

## 2020-10-28 RX ORDER — MEPERIDINE HYDROCHLORIDE 25 MG/ML
12.5 INJECTION INTRAMUSCULAR; INTRAVENOUS; SUBCUTANEOUS
Status: CANCELLED | OUTPATIENT
Start: 2020-10-28

## 2020-10-28 RX ORDER — SODIUM CHLORIDE, SODIUM LACTATE, POTASSIUM CHLORIDE, CALCIUM CHLORIDE 600; 310; 30; 20 MG/100ML; MG/100ML; MG/100ML; MG/100ML
INJECTION, SOLUTION INTRAVENOUS CONTINUOUS
Status: DISCONTINUED | OUTPATIENT
Start: 2020-10-28 | End: 2020-10-28 | Stop reason: HOSPADM

## 2020-10-28 RX ORDER — ONDANSETRON 2 MG/ML
4 INJECTION INTRAMUSCULAR; INTRAVENOUS EVERY 6 HOURS PRN
Status: CANCELLED | OUTPATIENT
Start: 2020-10-28

## 2020-10-28 RX ORDER — FLUMAZENIL 0.1 MG/ML
0.2 INJECTION, SOLUTION INTRAVENOUS
Status: CANCELLED | OUTPATIENT
Start: 2020-10-28 | End: 2020-10-29

## 2020-10-28 RX ORDER — PROPOFOL 10 MG/ML
INJECTION, EMULSION INTRAVENOUS CONTINUOUS PRN
Status: DISCONTINUED | OUTPATIENT
Start: 2020-10-28 | End: 2020-10-28

## 2020-10-28 RX ORDER — ONDANSETRON 4 MG/1
4 TABLET, ORALLY DISINTEGRATING ORAL EVERY 6 HOURS PRN
Status: CANCELLED | OUTPATIENT
Start: 2020-10-28

## 2020-10-28 RX ADMIN — MIDAZOLAM 2 MG: 1 INJECTION INTRAMUSCULAR; INTRAVENOUS at 12:29

## 2020-10-28 RX ADMIN — Medication 30 MG: at 12:38

## 2020-10-28 RX ADMIN — Medication 20 MG: at 12:35

## 2020-10-28 RX ADMIN — GLYCOPYRROLATE 0.2 MG: 0.2 INJECTION, SOLUTION INTRAMUSCULAR; INTRAVENOUS at 12:29

## 2020-10-28 RX ADMIN — SODIUM CHLORIDE, POTASSIUM CHLORIDE, SODIUM LACTATE AND CALCIUM CHLORIDE: 600; 310; 30; 20 INJECTION, SOLUTION INTRAVENOUS at 11:58

## 2020-10-28 RX ADMIN — PROPOFOL 75 MCG/KG/MIN: 10 INJECTION, EMULSION INTRAVENOUS at 12:33

## 2020-10-28 ASSESSMENT — LIFESTYLE VARIABLES: TOBACCO_USE: 1

## 2020-10-28 ASSESSMENT — MIFFLIN-ST. JEOR
SCORE: 2522.91
SCORE: 2522.91

## 2020-10-28 NOTE — ANESTHESIA PREPROCEDURE EVALUATION
Anesthesia Pre-Procedure Evaluation    Patient: Kimo Greenwood   MRN: [id number] : 1980          Preoperative Diagnosis: Patel's esophagus determined by biopsy [K22.70]  Duodenitis [K29.80]    Procedure(s):  ESOPHAGOGASTRODUODENOSCOPY, WITH BIOPSY (fv)  COLONOSCOPY    Past Medical History:   Diagnosis Date     Arthritis     DDD hips and knees     Asthma      Chronic pain - left hip/leg pain     degenerative disc disease, back, hips, knees     Gastroesophageal reflux disease      Hypertension      Marijuana abuse      MDD (major depressive disorder)      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) 2010    With associated speech/language delay     Obesity 2010     LOREN (obstructive sleep apnea)     Uses a CPAP     Seborrheic dermatitis      Past Surgical History:   Procedure Laterality Date     ARTHROPLASTY HIP Left 2017    Procedure: ARTHROPLASTY HIP;  Surgeon: Bala Randall MD;  Location: [facility] OR     ARTHROPLASTY REVISION HIP Left 2019    Procedure: Revision left total hip arthroplasty with a head and liner exchange to a Biomet dual mobility head and liner.;  Surgeon: Bala Randall MD;  Location:  OR     BLADDER SURGERY      [facility], [facility],Interstem stimulator implant     CLOSED REDUCTION HIP Left 6/10/2019    Procedure: Closed reduction under general anesthesia left recurrent prosthetic hip dislocation;  Surgeon: Rafat Cazares MD;  Location:  OR     COLONOSCOPY N/A 10/30/2015    Procedure: COMBINED COLONOSCOPY, SINGLE OR MULTIPLE BIOPSY/POLYPECTOMY BY BIOPSY;  Surgeon: Alexis Jackson MD;  Location:  GI     COLONOSCOPY N/A 2016    Procedure: COMBINED COLONOSCOPY, SINGLE OR MULTIPLE BIOPSY/POLYPECTOMY BY BIOPSY;  Surgeon: Cat Huber MD;  Location:  GI     ESOPHAGOSCOPY, GASTROSCOPY, DUODENOSCOPY (EGD), COMBINED N/A 2016    Procedure: COMBINED ESOPHAGOSCOPY, GASTROSCOPY, DUODENOSCOPY (EGD), BIOPSY SINGLE OR  MULTIPLE;  Surgeon: Cat Huber MD;  Location:  GI     ESOPHAGOSCOPY, GASTROSCOPY, DUODENOSCOPY (EGD), COMBINED N/A 3/12/2020    Procedure: ESOPHAGOGASTRODUODENOSCOPY WITH BIOPSIES;  Surgeon: You Kraus MD;  Location:  OR     EXAM UNDER ANESTHESIA, FULGURATE CONDYLOMA ANUS, COMBINED N/A 12/22/2016    Procedure: COMBINED EXAM UNDER ANESTHESIA, FULGURATE CONDYLOMA ANUS;  Surgeon: Nya Cabello MD;  Location:  OR     GENITOURINARY SURGERY       Anesthesia Evaluation     .             ROS/MED HX    ENT/Pulmonary:     (+)sleep apnea, tobacco use, asthma uses CPAP , . .    Neurologic:     (+)other neuro mild MR    Cardiovascular:     (+) hypertension----. : . . . :. .       METS/Exercise Tolerance:     Hematologic:         Musculoskeletal:   (+) arthritis,  other musculoskeletal- LBP      GI/Hepatic:     (+) GERD Other GI/Hepatic Barretts      Renal/Genitourinary:  - ROS Renal section negative       Endo:     (+) Obesity, .      Psychiatric:     (+) psychiatric history depression      Infectious Disease:         Malignancy:         Other:    (+) H/O Chronic Pain,                        Physical Exam      Airway   Mallampati: II  TM distance: >3 FB  Neck ROM: full    Dental     Cardiovascular       Pulmonary             Lab Results   Component Value Date    WBC 8.4 10/26/2020    HGB 13.4 10/26/2020    HCT 40.6 10/26/2020     10/26/2020    CRP 14.0 (H) 06/18/2019    SED 18 (H) 06/18/2019     10/26/2020    POTASSIUM 4.3 10/26/2020    CHLORIDE 104 10/26/2020    CO2 32 10/26/2020    BUN 12 10/26/2020    CR 0.86 10/26/2020    GLC 89 10/26/2020    FILEMON 9.5 10/26/2020    ALBUMIN 3.9 08/27/2019    PROTTOTAL 8.0 08/27/2019    ALT 27 08/27/2019    AST 14 08/27/2019    ALKPHOS 77 08/27/2019    BILITOTAL 0.3 08/27/2019    LIPASE 120 09/25/2015    INR 0.92 01/09/2017    TSH 2.86 07/13/2018    T4 1.38 04/10/2012       Preop Vitals  BP Readings from Last 3 Encounters:   10/26/20 120/80  "  09/25/20 (!) 141/78   09/23/20 126/70    Pulse Readings from Last 3 Encounters:   10/26/20 78   09/25/20 86   09/18/20 92      Resp Readings from Last 3 Encounters:   10/26/20 20   09/25/20 18   09/18/20 20    SpO2 Readings from Last 3 Encounters:   10/26/20 96%   09/25/20 100%   06/21/20 97%      Temp Readings from Last 1 Encounters:   10/26/20 97.6  F (36.4  C) (Oral)    Ht Readings from Last 1 Encounters:   10/26/20 1.778 m (5' 10\")      Wt Readings from Last 1 Encounters:   10/26/20 (!) 165.9 kg (365 lb 12.8 oz)    Estimated body mass index is 52.49 kg/m  as calculated from the following:    Height as of 10/26/20: 1.778 m (5' 10\").    Weight as of 10/26/20: 165.9 kg (365 lb 12.8 oz).       Anesthesia Plan      History & Physical Review  History and physical reviewed and following examination; no interval change.    ASA Status:  3 .    NPO Status:  > 8 hours    Plan for MAC            Postoperative Care  Postoperative pain management:  IV analgesics.      Consents  Anesthetic plan, risks, benefits and alternatives discussed with:  Patient..                 Tima Haji MD                    .  "

## 2020-10-28 NOTE — ANESTHESIA CARE TRANSFER NOTE
Patient: Kimo Greenwood    Procedure(s):  ESOPHAGOGASTRODUODENOSCOPY, WITH BIOPSY (fv)  COLONOSCOPY    Diagnosis: Patel's esophagus determined by biopsy [K22.70]  Duodenitis [K29.80]  Diagnosis Additional Information: No value filed.    Anesthesia Type:   MAC     Note:    Patient transferred to:Phase II  Handoff Report: Identifed the Patient, Identified the Reponsible Provider, Reviewed the pertinent medical history, Discussed the surgical course, Reviewed Intra-OP anesthesia mangement and issues during anesthesia, Set expectations for post-procedure period and Allowed opportunity for questions and acknowledgement of understanding      Vitals: (Last set prior to Anesthesia Care Transfer)    CRNA VITALS  10/28/2020 1245 - 10/28/2020 1315      10/28/2020             NIBP:  113/66    Pulse:  85    NIBP Mean:  90    SpO2:  (!) 80 %                Electronically Signed By: LAMBERT Bermeo CRNA  October 28, 2020  1:15 PM

## 2020-10-28 NOTE — DISCHARGE INSTRUCTIONS
GENERAL ANESTHESIA OR SEDATION ADULT DISCHARGE INSTRUCTIONS   SPECIAL PRECAUTIONS FOR 24 HOURS AFTER SURGERY    IT IS NOT UNUSUAL TO FEEL LIGHT-HEADED OR FAINT, UP TO 24 HOURS AFTER SURGERY OR WHILE TAKING PAIN MEDICATION.  IF YOU HAVE THESE SYMPTOMS; SIT FOR A FEW MINUTES BEFORE STANDING AND HAVE SOMEONE ASSIST YOU WHEN YOU GET UP TO WALK OR USE THE BATHROOM.    YOU SHOULD REST AND RELAX FOR THE NEXT 24 HOURS AND YOU MUST MAKE ARRANGEMENTS TO HAVE SOMEONE STAY WITH YOU FOR AT LEAST 24 HOURS AFTER YOUR DISCHARGE.  AVOID HAZARDOUS AND STRENUOUS ACTIVITIES.  DO NOT MAKE IMPORTANT DECISIONS FOR 24 HOURS.    DO NOT DRIVE ANY VEHICLE OR OPERATE MECHANICAL EQUIPMENT FOR 24 HOURS FOLLOWING THE END OF YOUR SURGERY.  EVEN THOUGH YOU MAY FEEL NORMAL, YOUR REACTIONS MAY BE AFFECTED BY THE MEDICATION YOU HAVE RECEIVED.    DO NOT DRINK ALCOHOLIC BEVERAGES FOR 24 HOURS FOLLOWING YOUR SURGERY.    DRINK CLEAR LIQUIDS (APPLE JUICE, GINGER ALE, 7-UP, BROTH, ETC.).  PROGRESS TO YOUR REGULAR DIET AS YOU FEEL ABLE.    YOU MAY HAVE A DRY MOUTH, A SORE THROAT, MUSCLES ACHES OR TROUBLE SLEEPING.  THESE SHOULD GO AWAY AFTER 24 HOURS.    CALL YOUR DOCTOR FOR ANY OF THE FOLLOWING:  SIGNS OF INFECTION (FEVER, GROWING TENDERNESS AT THE SURGERY SITE, A LARGE AMOUNT OF DRAINAGE OR BLEEDING, SEVERE PAIN, FOUL-SMELLING DRAINAGE, REDNESS OR SWELLING.    IT HAS BEEN OVER 8 TO 10 HOURS SINCE SURGERY AND YOU ARE STILL NOT ABLE TO URINATE (PASS WATER).     COLONOSCOPY DISCHARGE INSTRUCTIONS    You may not drive, use heavy equipment or consume alcohol for 24 hours because the drugs you were given may cause dizziness, drowsiness, forgetfulness and slower reaction time.    You may resume your regular diet and medications.    If you had a biopsy or polypectomy done, do not take aspirin, aleve (naproxen) or ibuprofen products for the next 10 days.  Tylenol (acetaminophen) is safe to take.    What to watch for:    Problems rarely occur after the exam.  It is  important for you to be aware of the early signs of a possible complication.  Call your doctor immediately if you notice any of the followin.  Unusual or persistent abdominal pain (pain that does not move around like a gas pain).    2.  Passing bright red blood from your rectum.    3.  Black or bloody stools.    4.  Temperature above 100.6 degrees F (37.5 degrees C)    If you feel this has become a medical emergency please call 911.    ESOPHAGOGASTRODUODENOSCOPY DISCHARGE INSTRUCTIONS    You may not drive, use heavy equipment or consume alcohol for 24 hours because the drugs you were given may cause dizziness, drowsiness, forgetfulness and slower reaction time.    Small pieces or tissue (biopsies) or polyps may have been removed.    You may resume your regular diet and medications. Exception: If you had a biopsy or polypectomy, do not take aspirin, aleve (naproxen) or ibuprofen for the next 10 days.  Tylenol (acetaminophen) is safe to take.    Additional instructions:  If you had a biopsy or polypectomy, the pathology report will be sent to your doctor.  If you have not received the results within 10 days, call your doctor's office.    What to watch for:  Problems rarely occur after the procedure.  It is important for you to be aware of the early signs of a possible complication.  Call immediately if you notice any of the followin.  Unusual pain or difficulty swallowing.    2.  Unusual abdominal or chest pain.    3.  Vomiting of blood.    4.  Black or bloody stools.    5.  Temperature above 100.6 degrees F

## 2020-10-28 NOTE — ANESTHESIA POSTPROCEDURE EVALUATION
Patient: Kimo Greenwood    Procedure(s):  ESOPHAGOGASTRODUODENOSCOPY, WITH BIOPSY (fv)  COLONOSCOPY    Diagnosis:Patel's esophagus determined by biopsy [K22.70]  Duodenitis [K29.80]  Diagnosis Additional Information: No value filed.    Anesthesia Type:  MAC    Note:  Anesthesia Post Evaluation    Patient location during evaluation: PACU  Patient participation: Able to fully participate in evaluation  Level of consciousness: awake  Pain management: adequate  Airway patency: patent  Cardiovascular status: acceptable  Respiratory status: acceptable  Hydration status: acceptable  PONV: none             Last vitals:  Vitals:    10/28/20 1108 10/28/20 1317 10/28/20 1340   BP: 128/66 104/42 122/79   Pulse: 73     Resp: 20 20 20   Temp: 97.6  F (36.4  C) 98  F (36.7  C) 97.2  F (36.2  C)   SpO2: 95% 100%          Electronically Signed By: Tima Haji MD  October 28, 2020  1:50 PM

## 2020-10-28 NOTE — LETTER
October 12, 2020      Kimo Greenwood  [address] [place] [address]        Dear Kimo,       Please be aware that coverage of these services is subject to the terms and limitations of your health insurance plan.  Call member services at your health plan with any benefit or coverage questions.    Thank you for choosing [facility]. You are scheduled for the following service(s):    Date:  10/28/2020             Procedure:  COLONOSCOPY  Doctor:        Efra   Arrival Time:  0800  *Enter and check in at the Main Hospital Entrance*  Procedure Time:  0830      Location:   [facility]        Endoscopy Department, First Floor         [address] [place], [place] [address]      [phone] or [phone] (Formerly [facility]) to reschedule      MIRALAX -GATORADE  PREP  Colonoscopy is the most accurate test to detect colon polyps and colon cancer; and the only test where polyps can be removed. During this procedure, a doctor examines the lining of your large intestine and rectum through a flexible tube.   Transportation  You must arrange for a ride for the day of your procedure with a responsible adult. A taxi , Uber, etc, is not an option unless you are accompanied by a responsible adult. If you fail to arrange transportation with a responsible adult, your procedure will be cancelled and rescheduled.    Purchase the  following supplies at your local pharmacy:  - 2 (two) bisacodyl tablets: each tablet contains 5 mg.  (Dulcolax  laxative NOT Dulcolax  stool softener)   - 1 (one) 8.3 oz bottle of Polyethylene Glycol (PEG) 3350 Powder   (MiraLAX , Smooth LAX , ClearLAX  or equivalent)  - 64 oz Gatorade    Regular Gatorade, Gatorade G2 , Powerade , Powerade Zero  or Pedialyte  is acceptable. Red colored flavors are not allowed; all other colors (yellow, green, orange, purple and blue) are okay. It is also okay to buy two 2.12 oz packets of powdered Gatorade  that can be mixed with water to a total volume of 64 oz of liquid.  - 1 (one) 10 oz bottle of Magnesium Citrate (Red colored flavors are not allowed)  It is also okay for you to use a 0.5 oz package of powdered magnesium citrate (17 g) mixed with 10 oz of water.      PREPARATION FOR COLONOSCOPY    7 days before:    Discontinue fiber supplements and medications containing iron. This includes Metamucil  and Fibercon ; and multivitamins with iron.    3 days before:    Begin a low-fiber diet. A low-fiber diet helps making the cleanout more effective.     Examples of a low-fiber diet include (but are not limited to): white bread, white rice, pasta, crackers, fish, chicken, eggs, ground beef, creamy peanut butter, cooked/steamed/boiled vegetables, canned fruit, bananas, melons, milk, plain yogurt cheese, salad dressing and other condiments.     The following are not allowed on a low-fiber diet: seeds, nuts, popcorn, bran, whole wheat, corn, quinoa, raw fruits and vegetables, berries and dried fruit, beans and lentils.    For additional details on low-fiber diet, please refer to the table on the last page.    2 days before:    Continue the low-fiber diet.     Drink at least 8 glasses of water throughout the day.     Stop eating solid foods at 11:45 pm.    1 day before:    In the morning: begin a clear liquid diet (liquids you can see through).     Examples of a clear liquid diet include: water, clear broth or bouillon, Gatorade, Pedialyte or Powerade, carbonated and non-carbonated soft drinks (Sprite , 7-Up , ginger ale), strained fruit juices without pulp (apple, white grape, white cranberry), Jell-O  and popsicles.     The following are not allowed on a clear liquid diet: red liquids, alcoholic beverages, dairy products (milk, creamer, and yogurt), protein shakes, creamy broths, juice with pulp and chewing tobacco.    At noon: take 2 (two) bisacodyl tablets     At 4 (and no later than 6pm): start drinking the  Miralax-Gatorade preparation (8.3 oz of Miralax mixed with 64 oz of Gatorade in a large pitcher). Drink 1(one) 8 oz glass every 15 minutes thereafter, until the mixture is gone.    COLON CLEANSING TIPS: drink adequate amounts of fluids before and after your colon cleansing to prevent dehydration. Stay near a toilet because you will have diarrhea. Even if you are sitting on the toilet, continue to drink the cleansing solution every 15 minutes. If you feel nauseous or vomit, rinse your mouth with water, take a 15 to 30-minute-break and then continue drinking the solution. You will be uncomfortable until the stool has flushed from your colon (in about 2 to 4 hours). You may feel chilled.    Day of your procedure  You may take all of your morning medications including blood pressure medications, blood thinners (if you have not been instructed to stop these by our office), methadone, anti-seizure medications with sips of water 3 hours prior to your procedure or earlier. Do not take insulin or vitamins prior to your procedure. Continue the clear liquid diet.       4 hours prior: drink 10 oz of magnesium citrate. It may be easier to drink it with a straw.    STOP consuming all liquids after that.     Do not take anything by mouth during this time.     Allow extra time to travel to your procedure as you may need to stop and use a restroom along the way.    You are ready for the procedure, if you followed all instructions and your stool is no longer formed, but clear or yellow liquid. If you are unsure whether your colon is clean, please call our office at [phone] before you leave for your appointment.    Bring the following to your procedure:  - Insurance Card/Photo ID.   - List of current medications including over-the-counter medications and supplements.   - Your rescue inhaler if you currently use one to control asthma.    Canceling or rescheduling your appointment:   If you must cancel or reschedule your  appointment, please call [phone] as soon as possible.      COLONOSCOPY PRE-PROCEDURE CHECKLIST    If you have diabetes, ask your regular doctor for diet and medication restrictions.  If you take an anticoagulant or anti-platelet medication (such as Coumadin , Lovenox , Pradaxa , Xarelto , Eliquis , etc.), please call your primary doctor for advice on holding this medication.  If you take aspirin you may continue to do so.  If you are or may be pregnant, please discuss the risks and benefits of this procedure with your doctor.        What happens during a colonoscopy?    Plan to spend up to two hours, starting at registration time, at the endoscopy center the day of your procedure. The colonoscopy takes an average of 15 to 30 minutes. Recovery time is about 30 minutes.      Before the exam:    You will change into a gown.    Your medical history and medication list will be reviewed with you, unless that has been done over the phone prior to the procedure.     A nurse will insert an intravenous (IV) line into your hand or arm.    The doctor will meet with you and will give you a consent form to sign.  During the exam:     Medicine will be given through the IV line to help you relax.     Your heart rate and oxygen levels will be monitored. If your blood pressure is low, you may be given fluids through the IV line.     The doctor will insert a flexible hollow tube, called a colonoscope, into your rectum. The scope will be advanced slowly through the large intestine (colon).    You may have a feeling of fullness or pressure.     If an abnormal tissue or a polyp is found, the doctor may remove it through the endoscope for closer examination, or biopsy. Tissue removal is painless    After the exam:           Any tissue samples removed during the exam will be sent to a lab for evaluation. It may take 5-7 working days for you to be notified of the results.     A nurse will provide you with complete discharge  instructions before you leave the endoscopy center. Be sure to ask the nurse for specific instructions if you take blood thinners such as Aspirin, Coumadin or Plavix.     The doctor will prepare a full report for you and for the physician who referred you for the procedure.     Your doctor will talk with you about the initial results of your exam.      Medication given during the exam will prohibit you from driving for the rest of the day.     Following the exam, you may resume your normal diet. Your first meal should be light, no greasy foods. Avoid alcohol until the next day.     You may resume your regular activities the day after the procedure.         LOW-FIBER DIET    Foods RECOMMENDED Foods to AVOID   Breads, Cereal, Rice and Pasta:   White bread, rolls, biscuits, croissant and dat toast.   Waffles, Romanian toast and pancakes.   White rice, noodles, pasta, macaroni and peeled cooked potatoes.   Plain crackers and saltines.   Cooked cereals: farina, cream of rice.   Cold cereals: Puffed Rice , Rice Krispies , Corn Flakes  and Special K    Breads, Cereal, Rice and Pasta:   Breads or rolls with nuts, seeds or fruit.   Whole wheat, pumpernickel, rye breads and cornbread.   Potatoes with skin, brown or wild rice, and kasha (buckwheat).     Vegetables:   Tender cooked and canned vegetables without seeds: carrots, asparagus tips, green or wax beans, pumpkin, spinach, lima beans. Vegetables:   Raw or steamed vegetables.   Vegetables with seeds.   Sauerkraut.   Winter squash, peas, broccoli, Brussel sprouts, cabbage, onions, cauliflower, baked beans, peas and corn.   Fruits:   Strained fruit juice.   Canned fruit, except pineapple.   Ripe bananas and melon. Fruits:   Prunes and prune juice.   Raw fruits.   Dried fruits: figs, dates and raisins.   Milk/Dairy:   Milk: plain or flavored.   Yogurt, custard and ice cream.   Cheese and cottage cheese Milk/Dairy:     Meat and other proteins:   ground, well-cooked tender  beef, lamb, ham, veal, pork, fish, poultry and organ meats.   Eggs.   Peanut butter without nuts. Meat and other proteins:   Tough, fibrous meats with gristle.   Dry beans, peas and lentils.   Peanut butter with nuts.   Tofu.   Fats, Snack, Sweets, Condiments and Beverages:   Margarine, butter, oils, mayonnaise, sour cream and salad dressing, plain gravy.   Sugar, hard candy, clear jelly, honey and syrup.   Spices, cooked herbs, bouillon, broth and soups made with allowed vegetable, ketchup and mustard.   Coffee, tea and carbonated drinks.   Plain cakes, cookies and pretzels.   Gelatin, plain puddings, custard, ice cream, sherbet and popsicles. Fats, Snack, Sweets, Condiments and Beverages:   Nuts, seeds and coconut.   Jam, marmalade and preserves.   Pickles, olives, relish and horseradish.   All desserts containing nuts, seeds, dried fruit and coconut; or made from whole grains or bran.   Candy made with nuts or seeds.   Popcorn.         DIRECTIONS TO THE ENDOSCOPY DEPARTMENT    From the north ([facility])  Take [address] South, exit on [address] [address]. Get into the left hand [address], turn left (east), go one-half mile to [address] and turn left. Go north to the second stoplight, take a right on [address] and follow it to the Main Hospital entrance.    From the south ([facility])  Take [address]N to the [address] split and exit on [address] [address]. On [address] [address], turn left (west) to [address]. Turn right (north) on [address]. Go north to the second stoplight, take a right on [address] and follow it to the Main Hospital entrance.    From the east via 35E ([facility])  Take [address] south to [address] [address] exit. Turn right on [address] [address]. Go west to [address]. Turn right (north) on [address]. Go to the second stoplight, take a right on [address] to the Main Hospital entrance.    From the east via [address] ([facility])  Take [facility] [address]  West to [address]. Turn left (south) on [address] to [address], turn left (east) on [address] and follow it to the Main Hospital entrance.    From the west via [address] ([place], [place])  Take [address] east to [address]. Turn right (south) on [address] to [address], turn left (east) on [address] and follow it to the Main Hospital entrance.

## 2020-10-28 NOTE — H&P
Pre-Endoscopy History and Physical     Kimo Greenwood MRN# [id number]   YOB: 1980 Age: 39 year old     Date of Procedure: 10/28/2020  Primary care provider: Kory Hudson  Type of Endoscopy: Colonoscopy with possible biopsy, possible polypectomy and Gastroscopy with possible biopsy, possible dilation  Reason for Procedure: screen  Type of Anesthesia Anticipated: Conscious Sedation    HPI:    Kimo is a 39 year old male who will be undergoing the above procedure.      A history and physical has been performed. The patient's medications and allergies have been reviewed. The risks and benefits of the procedure and the sedation options and risks were discussed with the patient.  All questions were answered and informed consent was obtained.      He denies a personal or family history of anesthesia complications or bleeding disorders.     Patient Active Problem List   Diagnosis     Speech/language delay     Tobacco use disorder     Nocturnal enuresis     Moderate major depression (H)     LOREN (obstructive sleep apnea)     Hypertension goal BP (blood pressure) < 140/90     Morbid obesity (H)     Leukocytosis     Suicidal ideation     Chronic low back pain     Bilateral low back pain with left-sided sciatica     History of colonic polyps     Mild intellectual disability     S/P total hip arthroplasty     Vitamin D deficiency     LPRD (laryngopharyngeal reflux disease)     Patel's esophagus determined by biopsy     Mild intermittent asthma     Somatic dysfunction of lumbar region     Somatic dysfunction of sacral region     Sacral pain     Thoracic segment dysfunction     Hip pain, left     Duodenitis        Past Medical History:   Diagnosis Date     Arthritis     DDD hips and knees     Asthma      Chronic pain - left hip/leg pain     degenerative disc disease, back, hips, knees     Gastroesophageal reflux disease      Hypertension      Marijuana abuse      MDD (major depressive disorder)       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    With associated speech/language delay     Obesity 11/11/2010     LOREN (obstructive sleep apnea)     Uses a CPAP     Seborrheic dermatitis         Past Surgical History:   Procedure Laterality Date     ARTHROPLASTY HIP Left 1/25/2017    Procedure: ARTHROPLASTY HIP;  Surgeon: Bala Randall MD;  Location: [facility] OR     ARTHROPLASTY REVISION HIP Left 6/24/2019    Procedure: Revision left total hip arthroplasty with a head and liner exchange to a Biomet dual mobility head and liner.;  Surgeon: Bala Randall MD;  Location: [facility] OR     BLADDER SURGERY      [facility], [facility],Interstem stimulator implant     CLOSED REDUCTION HIP Left 6/10/2019    Procedure: Closed reduction under general anesthesia left recurrent prosthetic hip dislocation;  Surgeon: Rafat Cazares MD;  Location:  OR     COLONOSCOPY N/A 10/30/2015    Procedure: COMBINED COLONOSCOPY, SINGLE OR MULTIPLE BIOPSY/POLYPECTOMY BY BIOPSY;  Surgeon: Alexis Jackson MD;  Location:  GI     COLONOSCOPY N/A 11/17/2016    Procedure: COMBINED COLONOSCOPY, SINGLE OR MULTIPLE BIOPSY/POLYPECTOMY BY BIOPSY;  Surgeon: Cat Huber MD;  Location:  GI     ESOPHAGOSCOPY, GASTROSCOPY, DUODENOSCOPY (EGD), COMBINED N/A 11/17/2016    Procedure: COMBINED ESOPHAGOSCOPY, GASTROSCOPY, DUODENOSCOPY (EGD), BIOPSY SINGLE OR MULTIPLE;  Surgeon: Cat Huber MD;  Location:  GI     ESOPHAGOSCOPY, GASTROSCOPY, DUODENOSCOPY (EGD), COMBINED N/A 3/12/2020    Procedure: ESOPHAGOGASTRODUODENOSCOPY WITH BIOPSIES;  Surgeon: You Kraus MD;  Location:  OR     EXAM UNDER ANESTHESIA, FULGURATE CONDYLOMA ANUS, COMBINED N/A 12/22/2016    Procedure: COMBINED EXAM UNDER ANESTHESIA, FULGURATE CONDYLOMA ANUS;  Surgeon: Nya Cabello MD;  Location:  OR     GENITOURINARY SURGERY         Social History     Tobacco Use     Smoking status: Current Every Day Smoker     Packs/day: 1.00  "    Years: 1.00     Pack years: 1.00     Types: Cigarettes     Smokeless tobacco: Never Used     Tobacco comment: Smokes E Cigs equal to 1 PPD   Substance Use Topics     Alcohol use: Yes     Comment: socially--\"not very often\" \"once a month\"       Family History   Problem Relation Age of Onset     Anxiety Disorder Mother      Depression Mother      Ulcerative Colitis Mother 18     Breast Cancer Maternal Grandmother      Cerebrovascular Disease Maternal Grandmother      Breast Cancer Maternal Aunt      Cancer Maternal Grandfather         grt uncles colon cancer       Prior to Admission medications    Medication Sig Start Date End Date Taking? Authorizing Provider   ARIPiprazole (ABILIFY) 10 MG tablet  10/20/20  Yes Reported, Patient   betamethasone dipropionate (DIPROSONE) 0.05 % external ointment Apply topically 2 times daily Apply twice daily up to 2 weeks for rash.   Yes Unknown, Entered By History   Calcium Polycarbophil (FIBER) 625 MG tablet TAKE 2 TABLETS BY MOUTH TWICE A DAY 8/12/20  Yes Aaseby-Aguilera, Ramona Ann, PA-C   hydrocortisone 2.5 % cream Apply topically 2 times daily Apply sparing to face for 1-2 weeks. 5/16/18  Yes Kory Hudson MD   ketoconazole (NIZORAL) 2 % external shampoo APPLY TO SCALP & BEARD TWICE WEEKLY  WASH OFF AFTER 5 MINUTES 9/14/20  Yes Kory Hudson MD   lisinopril (ZESTRIL) 10 MG tablet TAKE 1 TABLET BY MOUTH EVERY MORNING 6/16/20  Yes Kory Hudson MD   prazosin (MINIPRESS) 1 MG capsule Take 3 mg by mouth At Bedtime   Yes Unknown, Entered By History   albuterol (VENTOLIN HFA) 108 (90 Base) MCG/ACT inhaler INHALE 2 PUFFS INTO LUNGS EVERY SIX HOURS AS NEEDED FOR SHORTNESS OF BREATH / DYSPNEA OR WHEEZING 12/31/18   Kory Hudson MD   Cholecalciferol (VITAMIN D) 50 MCG (2000 UT) CAPS TAKE 1 CAPSULE BY MOUTH DAILY 12/23/19   Kory Hudson MD   hydrochlorothiazide (HYDRODIURIL) 12.5 MG tablet TAKE 1 TABLET (12.5MG) BY MOUTH DAILY 7/3/19   Kory Hudson MD " "  naproxen sodium 220 MG capsule If Tylenol is not helpful, this can be added 2 tabs twice a day with meals. 8/24/18   Reported, Patient   ondansetron (ZOFRAN-ODT) 4 MG ODT tab  6/10/19   Reported, Patient   order for DME Equipment being ordered: CPAP supplies 8/17/20   Kory Hudson MD   order for DME Shoe horn with extended handle  [facility]  Phone: [phone]  Fax: [phone] 6/15/20   Aaseby-Aguilera, Ramona Ann, PA-C   oxybutynin ER (DITROPAN XL) 15 MG 24 hr tablet Take 30 mg by mouth daily    Unknown, Entered By History   pantoprazole (PROTONIX) 40 MG EC tablet TAKE 1 TABLET BY MOUTH 30-60 MINUTES BEFORE FIRST MEAL OF THE DAY 6/16/20   Ciro Love MD   vitamin D3 (CHOLECALCIFEROL) 50 mcg (2000 units) tablet TAKE 1 TABLET BY MOUTH DAILY 10/2/20   Kory Hudson MD       Allergies   Allergen Reactions     No Clinical Screening - See Comments Other (See Comments)     Awoke from anesthesia with anger and ripping off dressing, after interstim placement, outside hospital 2013        REVIEW OF SYSTEMS:   5 point ROS negative except as noted above in HPI, including Gen., Resp., CV, GI &  system review.    PHYSICAL EXAM:   /66 (BP Location: Left arm)   Pulse 73   Temp 97.6  F (36.4  C) (Temporal)   Resp 20   Ht 1.778 m (5' 10\")   Wt (!) 159.7 kg (352 lb)   SpO2 95%   BMI 50.51 kg/m   Estimated body mass index is 50.51 kg/m  as calculated from the following:    Height as of this encounter: 1.778 m (5' 10\").    Weight as of this encounter: 159.7 kg (352 lb).   GENERAL APPEARANCE: alert, and oriented  MENTAL STATUS: alert  AIRWAY EXAM: Mallampatti Class I (visualization of the soft palate, fauces, uvula, anterior and posterior pillars)  RESP: lungs clear to auscultation - no rales, rhonchi or wheezes  CV: regular rates and rhythm  DIAGNOSTICS:    Not indicated    IMPRESSION   ASA Class 2 - Mild systemic disease    PLAN:   Plan for Colonoscopy with possible biopsy, possible polypectomy " and Gastroscopy with possible biopsy, possible dilation. We discussed the risks, benefits and alternatives and the patient wished to proceed.    The above has been forwarded to the consulting provider.      Signed Electronically by: You Kraus MD  October 28, 2020

## 2020-10-29 ENCOUNTER — VIRTUAL VISIT (OUTPATIENT)
Dept: FAMILY MEDICINE | Facility: OTHER | Age: 40
End: 2020-10-29

## 2020-10-29 LAB — COPATH REPORT: NORMAL

## 2020-10-30 ENCOUNTER — NURSE TRIAGE (OUTPATIENT)
Dept: NURSING | Facility: CLINIC | Age: 40
End: 2020-10-30

## 2020-10-30 NOTE — PROGRESS NOTES
"Date: 10/29/2020 20:47:55  Clinician: Rachel Lua  Clinician NPI: [id number]  Patient: Kimo Greenwood  Patient : 1980  Patient Address: [address], [place] [address]  Patient Phone: [phone]  Visit Protocol: URI  Patient Summary:  Kimo is a 39 year old ( : 1980 ) male who initiated a [facility] Visit for COVID-19 (Coronavirus) evaluation and screening. When asked the question \"Please sign me up to receive news, health information and promotions. \", Kimo responded \"No\".    When asked when his symptoms started, Kimo reported that he does not have any symptoms.   He denies having recent facial or sinus surgery in the past 60 days and taking antibiotic medication in the past month.    Pertinent COVID-19 (Coronavirus) information  Kimo does not work or volunteer as healthcare worker or a . In the past 14 days, Kimo has not worked or volunteered at a healthcare facility or group living setting.   In the past 14 days, he also has not lived in a congregate living setting.   Kimo has had a close contact with a laboratory-confirmed COVID-19 patient in the last 14 days. He was not exposed at his work. Date Kimo was exposed to the laboratory-confirmed COVID-19 patient: 10/25/2020   Additional information about contact with COVID-19 (Coronavirus) patient as reported by the patient (free text): Cousin tested positive for Covid and he is symptomatic   Kimo is not living in the same household with the COVID-19 positive patient. He was in an enclosed space for greater than 15 minutes with the COVID-19 patient.   During the encounter, neither were wearing masks.   Since 2019, Kimo has not been diagnosed with lab-confirmed COVID-19 test and has not had upper respiratory infection or influenza-like illness.   Pertinent medical history  Kimo needs a return to work/school note.   Weight: 350 lbs   Kimo smokes or uses smokeless " tobacco.   Weight: 350 lbs    MEDICATIONS: No current medications, ALLERGIES: NKDA  Clinician Response:  Dear Kimo,   Based on your exposure to COVID-19 (coronavirus), we would like to test you for this virus.  1. Please call [phone] to schedule your visit. Explain that you were referred by [facility] to have a COVID-19 test. Be ready to share your [facility] visit ID number.   The following will serve as your written order for this COVID Test, ordered by me, for the indication of suspected COVID [Z20.828]: The test will be ordered in [facility], our electronic health record, after you are scheduled. It will show as ordered and authorized by Rohan Amato MD.  Order: COVID-19 (coronavirus) PCR for ASYMPTOMATIC EXPOSURE testing from [facility].   If you know you have had close contact with someone who tested positive, you should be quarantined for 14 days after this exposure. You should stay in quarantine for the14 days even if the covid test is negative, the optimal time to test after exposure is 5-7 days from the exposure  Quarantine means   What should I do?  For safety, it's very important to follow these rules. Do this for 14 days after the date you were last exposed to the virus..  Stay home and away from others. Don't go to school or anywhere else. Generally quarantine means staying home from work but there are some exceptions to this. Please contact your workplace.   No hugging, kissing or shaking hands.  Don't let anyone visit.  Cover your mouth and nose with a mask, tissue or washcloth to avoid spreading germs.  Wash your hands and face often. Use soap and water.  What are the symptoms of COVID-19?  The most common symptoms are cough, fever and trouble breathing. Less common symptoms include headache, body aches, fatigue (feeling very tired), chills, sore throat, stuffy or runny nose, diarrhea (loose poop), loss of taste or smell, belly pain, and nausea or vomiting (feeling sick to your stomach or throwing up).  After  14 days, if you have still don't have symptoms, you likely don't have this virus.  If you develop symptoms, follow these guidelines.  If you're normally healthy: Please start another [facility] visit to report your symptoms. Go to [email or web address].  If you have a serious health problem (like cancer, heart failure, an organ transplant or kidney disease): Call your specialty clinic. Let them know that you might have COVID-19.  2. When it's time for your COVID test:  Stay at least 6 feet away from others. (If someone will drive you to your test, stay in the backseat, as far away from the  as you can.)  Cover your mouth and nose with a mask, tissue or washcloth.  Go straight to the testing site. Don't make any stops on the way there or back.  Please note  Caregivers in these groups are at risk for severe illness due to COVID-19:  o People 65 years and older  o People who live in a nursing home or long-term care facility  o People with chronic disease (lung, heart, cancer, diabetes, kidney, liver, immunologic)  o People who have a weakened immune system, including those who:  Are in cancer treatment  Take medicine that weakens the immune system, such as corticosteroids  Had a bone marrow or organ transplant  Have an immune deficiency  Have poorly controlled HIV or AIDS  Are obese (body mass index of 40 or higher)  Smoke regularly  Where can I get more information?  [facility] -- About COVID-19: [email or web address]  CDC -- What to Do If You're Sick: www.cdc.gov/coronavirus/2019-ncov/about/steps-when-sick.html  CDC -- Ending Home Isolation: www.cdc.gov/coronavirus/2019-ncov/hcp/disposition-in-home-patients.html  CDC -- Caring for Someone: www.cdc.gov/coronavirus/2019-ncov/if-you-are-sick/care-for-someone.html  [facility] -- Interim Guidance for Hospital Discharge to Home: www.health.[facility].[place]/diseases/coronavirus/hcp/hospdischarge.pdf  [facility] clinical trials (COVID-19 research studies):  clinicalaffairs.[facility]-clinical-trials  Below are the COVID-19 hotlines at the [place] Department of Health ([facility]). Interpreters are available.  For health questions: Call 181-307-0884 or 1-243.600.5242 (7 a.m. to 7 p.m.)  For questions about schools and childcare: Call 683-848-7293 or 1-660.410.1895 (7 a.m. to 7 p.m.)    Diagnosis: Contact with and (suspected) exposure to other viral communicable diseases  Diagnosis ICD: Z20.828

## 2020-10-30 NOTE — TELEPHONE ENCOUNTER
Mom wants to make sure that the patient get his flu shot, his asthma check, ask about pneumonia shot.    The patient needs to have his physical rescheduled for today.    Cousin tested positive for covid on Wed.     All patient's recent Covid testing has been Neg. Has an upcoming covid test on Sunday.    Patient resent to scheduling to reschedule physical.  Currently has no COVID symptoms.    Taylor Morales RN  [place] Nurse Advisor  11:37 AM  10/30/2020      Reason for Disposition    Requesting regular office appointment    Protocols used: INFORMATION ONLY CALL-A-AH

## 2020-11-02 ENCOUNTER — AMBULATORY - HEALTHEAST (OUTPATIENT)
Dept: FAMILY MEDICINE | Facility: CLINIC | Age: 40
End: 2020-11-02

## 2020-11-02 DIAGNOSIS — Z20.822 SUSPECTED 2019 NOVEL CORONAVIRUS INFECTION: ICD-10-CM

## 2020-11-06 DIAGNOSIS — Z11.59 ENCOUNTER FOR SCREENING FOR OTHER VIRAL DISEASES: Primary | ICD-10-CM

## 2020-11-10 RX ORDER — NICOTINE POLACRILEX 4 MG
15-30 LOZENGE BUCCAL
Status: CANCELLED | OUTPATIENT
Start: 2020-11-10

## 2020-11-10 RX ORDER — DEXTROSE MONOHYDRATE 25 G/50ML
25-50 INJECTION, SOLUTION INTRAVENOUS
Status: CANCELLED | OUTPATIENT
Start: 2020-11-10

## 2020-11-13 DIAGNOSIS — Z11.59 ENCOUNTER FOR SCREENING FOR OTHER VIRAL DISEASES: ICD-10-CM

## 2020-11-13 PROCEDURE — 99000 SPECIMEN HANDLING OFFICE-LAB: CPT | Performed by: PATHOLOGY

## 2020-11-13 PROCEDURE — U0003 INFECTIOUS AGENT DETECTION BY NUCLEIC ACID (DNA OR RNA); SEVERE ACUTE RESPIRATORY SYNDROME CORONAVIRUS 2 (SARS-COV-2) (CORONAVIRUS DISEASE [COVID-19]), AMPLIFIED PROBE TECHNIQUE, MAKING USE OF HIGH THROUGHPUT TECHNOLOGIES AS DESCRIBED BY CMS-2020-01-R: HCPCS | Mod: 90 | Performed by: PATHOLOGY

## 2020-11-14 LAB
SARS-COV-2 RNA SPEC QL NAA+PROBE: NOT DETECTED
SPECIMEN SOURCE: NORMAL

## 2020-11-16 ENCOUNTER — TELEPHONE (OUTPATIENT)
Dept: MEDSURG UNIT | Facility: CLINIC | Age: 40
End: 2020-11-16

## 2020-11-16 NOTE — TELEPHONE ENCOUNTER
Pre-Procedure Negative COVID Test Results    Results Reviewed  The patient has a negative COVID test result within the required timeframe for the scheduled procedure.     No COVID pre-call needed.     Lindsay Anderson RN

## 2020-11-17 ENCOUNTER — HOSPITAL ENCOUNTER (OUTPATIENT)
Dept: GENERAL RADIOLOGY | Facility: CLINIC | Age: 40
Discharge: HOME | End: 2020-11-17
Attending: ORTHOPAEDIC SURGERY | Admitting: ORTHOPAEDIC SURGERY
Payer: COMMERCIAL

## 2020-11-17 VITALS — SYSTOLIC BLOOD PRESSURE: 149 MMHG | DIASTOLIC BLOOD PRESSURE: 75 MMHG | HEART RATE: 82 BPM | OXYGEN SATURATION: 100 %

## 2020-11-17 DIAGNOSIS — M16.9 HIP OSTEOARTHRITIS: ICD-10-CM

## 2020-11-17 PROCEDURE — 255N000002 HC RX 255 OP 636: Performed by: ORTHOPAEDIC SURGERY

## 2020-11-17 PROCEDURE — 250N000009 HC RX 250: Performed by: ORTHOPAEDIC SURGERY

## 2020-11-17 PROCEDURE — 77002 NEEDLE LOCALIZATION BY XRAY: CPT

## 2020-11-17 PROCEDURE — 250N000011 HC RX IP 250 OP 636: Performed by: ORTHOPAEDIC SURGERY

## 2020-11-17 RX ORDER — LIDOCAINE HYDROCHLORIDE 10 MG/ML
30 INJECTION, SOLUTION EPIDURAL; INFILTRATION; INTRACAUDAL; PERINEURAL ONCE
Status: COMPLETED | OUTPATIENT
Start: 2020-11-17 | End: 2020-11-17

## 2020-11-17 RX ORDER — BUPIVACAINE HYDROCHLORIDE 5 MG/ML
30 INJECTION, SOLUTION EPIDURAL; INTRACAUDAL ONCE
Status: COMPLETED | OUTPATIENT
Start: 2020-11-17 | End: 2020-11-17

## 2020-11-17 RX ORDER — IOPAMIDOL 408 MG/ML
10 INJECTION, SOLUTION INTRATHECAL ONCE
Status: COMPLETED | OUTPATIENT
Start: 2020-11-17 | End: 2020-11-17

## 2020-11-17 RX ORDER — TRIAMCINOLONE ACETONIDE 40 MG/ML
40 INJECTION, SUSPENSION INTRA-ARTICULAR; INTRAMUSCULAR ONCE
Status: COMPLETED | OUTPATIENT
Start: 2020-11-17 | End: 2020-11-17

## 2020-11-17 RX ADMIN — LIDOCAINE HYDROCHLORIDE 4.5 ML: 10 INJECTION, SOLUTION EPIDURAL; INFILTRATION; INTRACAUDAL; PERINEURAL at 16:11

## 2020-11-17 RX ADMIN — TRIAMCINOLONE ACETONIDE 1 ML: 40 INJECTION, SUSPENSION INTRA-ARTICULAR; INTRAMUSCULAR at 16:18

## 2020-11-17 RX ADMIN — BUPIVACAINE HYDROCHLORIDE 4 ML: 5 INJECTION, SOLUTION EPIDURAL; INTRACAUDAL at 16:18

## 2020-11-17 RX ADMIN — IOPAMIDOL 1 ML: 408 INJECTION, SOLUTION INTRATHECAL at 16:17

## 2020-11-17 NOTE — DISCHARGE INSTRUCTIONS
Orthopedic Discharge Instructions:   After Your Injection or Aspiration  ________________________________________    Patient Name:  Kimo Greenwood  Today's Date:  November 17, 2020  The provider who performed your INJECTION PROCEDURE was Alberto Rodriguez PA-C @ [facility] in the  RADIOLOGY Department  Care of needle site    If you have new numbness down your leg, this may last up to 6 hours, but it should go away. You may need help with walking until your leg feels normal.     Over the next 24 to 48 hours, pain at the needle site may increase before it gets better.     For the next 48 hours, use ice packs for 15 minutes, three to four times a day for pain.    If you have a bandage, you may remove it the next morning.     No tub baths, hot tubs or swimming for 48 hours. You may shower the next day.   Activity    Do not drive until morning.     Limit your activity based on your pain level. Follow your doctor s orders for activity.     You may eat a normal diet.     If you had sedation,   - You may feel sleepy, forgetful or unsteady.   - Do not drink alcohol for 24 hours.  Medicines    If you take aspirin or platelet inhibitors, you can restart them tomorrow.     Restart all other medicines today at your regular dose, including Coumadin (warfarin).    If you are restarting Coumadin, talk to your doctor about having your INR checked.   If you had a steroid shot     The medicine should help reduce swelling and pain. This may take from 7 to 10 days.     Side effects from the shot will be mild and go away in 2 to 3 days. Common side effects may include:  -  Insomnia (trouble sleeping).  -  Heartburn.  -  Flushed face.  -  Water retention (bloating or fluid build-up).  -  Increased appetite (feeling more hungry than usual).  -  Increased blood sugar.  If you have diabetes, watch your blood sugar closely. If needed, call your doctor to help you control your blood sugar.  Some patients will get lasting relief  from a single shot. Others may require up to three shots to get results. If you have more than one steroid shot, they should be given two weeks apart.  Some patients do not have relief of symptoms.    Follow-up:   NO FOLLOW-UP NEEDED     Call your doctor or go to the Emergency Room if you have severe pain, fever or problems with bowel or bladder control.

## 2020-11-17 NOTE — PROCEDURES
[facility]    Procedure: Right hip steroid injection    Date/Time: 11/17/2020 4:12 PM  Performed by: Alberto Rodriguez PA-C  Authorized by: Alberto Rodriguez PA-C   IR Fellow Physician: Alberto Rodriguez PA-C    UNIVERSAL PROTOCOL   Site Marked: Yes  Prior Images Obtained and Reviewed:  Yes  Required items: Required blood products, implants, devices and special equipment available    Patient identity confirmed:  Verbally with patient  Patient was reevaluated immediately before administering moderate or deep sedation or anesthesia  Confirmation Checklist:  Patient's identity using two indicators, relevant allergies, procedure was appropriate and matched the consent or emergent situation and correct equipment/implants were available  Time out: Immediately prior to the procedure a time out was called    Universal Protocol: the Joint Commission Universal Protocol was followed    Preparation: Patient was prepped and draped in usual sterile fashion           ANESTHESIA    Local Anesthetic: Lidocaine 1% without epinephrine and bupivacaine 0.5% without epinephrine      SEDATION    Patient Sedated: No    See dictated procedure note for full details.  PROCEDURE   Patient Tolerance:  Patient tolerated the procedure well with no immediate complications    Length of time physician/provider present for 1:1 monitoring during sedation: 0

## 2020-11-17 NOTE — IP AVS SNAPSHOT
[address] [address]  Phone: [phone]                                    After Visit Summary   11/17/2020    Kimo Greenwood    MRN: [id number]           After Visit Summary Signature Page    I have received my discharge instructions, and my questions have been answered. I have discussed any challenges I see with this plan with the nurse or doctor.    ..........................................................................................................................................  Patient/Patient Representative Signature      ..........................................................................................................................................  Patient Representative Print Name and Relationship to Patient    ..................................................               ................................................  Date                                   Time    ..........................................................................................................................................  Reviewed by Signature/Title    ...................................................              ..............................................  Date                                               Time          22EPIC Rev 08/18

## 2020-11-17 NOTE — IP AVS SNAPSHOT
MRN:[id number]                      After Visit Summary   11/17/2020    Kimo Greenwood    MRN: [id number]           Visit Information        Provider Department      11/17/2020  3:00 PM [facility]; MEGHAN; [id number]           Review of your medicines      UNREVIEWED medicines. Ask your doctor about these medicines       Dose / Directions   albuterol 108 (90 Base) MCG/ACT inhaler  Commonly known as: Ventolin HFA  Used for: Mild intermittent asthma without complication      INHALE 2 PUFFS INTO LUNGS EVERY SIX HOURS AS NEEDED FOR SHORTNESS OF BREATH / DYSPNEA OR WHEEZING  Quantity: 1 Inhaler  Refills: 11     ARIPiprazole 10 MG tablet  Commonly known as: ABILIFY      Refills: 0     betamethasone dipropionate 0.05 % external ointment  Commonly known as: DIPROSONE      Apply topically 2 times daily Apply twice daily up to 2 weeks for rash.  Refills: 0     Fiber 625 MG tablet  Used for: Slow transit constipation      TAKE 2 TABLETS BY MOUTH TWICE A DAY  Quantity: 120 tablet  Refills: 10     hydrochlorothiazide 12.5 MG tablet  Commonly known as: HYDRODIURIL  Used for: Peripheral edema, Hypertension goal BP (blood pressure) < 140/90      TAKE 1 TABLET (12.5MG) BY MOUTH DAILY  Quantity: 90 tablet  Refills: 3     hydrocortisone 2.5 % cream  Used for: Seborrheic dermatitis      Apply topically 2 times daily Apply sparing to face for 1-2 weeks.  Quantity: 30 g  Refills: 2     ketoconazole 2 % external shampoo  Commonly known as: NIZORAL  Used for: Seborrheic dermatitis      APPLY TO SCALP & BEARD TWICE WEEKLY  WASH OFF AFTER 5 MINUTES  Quantity: 120 mL  Refills: 8     lisinopril 10 MG tablet  Commonly known as: ZESTRIL  Used for: Hypertension goal BP (blood pressure) < 140/90      TAKE 1 TABLET BY MOUTH EVERY MORNING  Quantity: 30 tablet  Refills: 6     naproxen sodium 220 MG capsule      If Tylenol is not helpful, this can be added 2 tabs twice a day with  meals.  Refills: 0     ondansetron 4 MG ODT tab  Commonly known as: ZOFRAN-ODT      Refills: 0     oxybutynin ER 15 MG 24 hr tablet  Commonly known as: DITROPAN XL      Dose: 30 mg  Take 30 mg by mouth daily  Refills: 0     pantoprazole 40 MG EC tablet  Commonly known as: PROTONIX  Used for: Duodenitis, LPRD (laryngopharyngeal reflux disease)      TAKE 1 TABLET BY MOUTH 30-60 MINUTES BEFORE FIRST MEAL OF THE DAY  Quantity: 28 tablet  Refills: 5     prazosin 1 MG capsule  Commonly known as: MINIPRESS      Dose: 3 mg  Take 3 mg by mouth At Bedtime  Refills: 0     * vitamin D 50 MCG (2000 UT) Caps  Used for: Vitamin D deficiency      TAKE 1 CAPSULE BY MOUTH DAILY  Quantity: 28 capsule  Refills: 5     * vitamin D3 50 mcg (2000 units) tablet  Commonly known as: CHOLECALCIFEROL  Used for: Vitamin D deficiency      TAKE 1 TABLET BY MOUTH DAILY  Quantity: 28 tablet  Refills: 11         * This list has 2 medication(s) that are the same as other medications prescribed for you. Read the directions carefully, and ask your doctor or other care provider to review them with you.            CONTINUE these medicines which have NOT CHANGED       Dose / Directions   order for DME  Used for: Morbid obesity (H), Dislocation of left hip, sequela      Shoe horn with extended handle  [facility]  Phone: [phone]  Fax: [phone]  Quantity: 1 Device  Refills: 0     order for DME  Used for: LOREN (obstructive sleep apnea)      Equipment being ordered: CPAP supplies  Quantity: 1 each  Refills: 0              Protect others around you: Learn how to safely use, store and throw away your medicines at www.disposemymeds.org.       Follow-ups after your visit       Your next 10 appointments already scheduled    Jan 22, 2021  3:00 PM  (Arrive by 2:35 PM)  Adult Preventative Visit with Kory Hudson MD  [facility] [address] [address] [address] [phone]   Please  "plan to arrive at the clinic at your \"Arrive By\" time for your appointment. Our late policy will be enforced based on this time.  The purpose of this visit is to discuss your medical history and prevent health problems before you are sick. If you have additional concerns you would like to discuss outside of preventive care, you may be billed for that service.  If you have further questions, you may want to contact your insurance company to understand what is included in your preventive health benefits.         Care Instructions       Further instructions from your care team         Orthopedic Discharge Instructions:   After Your Injection or Aspiration  ________________________________________    Patient Name:  Kimo Greenwood  Today's Date:  November 17, 2020  The provider who performed your INJECTION PROCEDURE was Alberto Rodriguez PA-C @ [facility] in the  RADIOLOGY Department  Care of needle site    If you have new numbness down your leg, this may last up to 6 hours, but it should go away. You may need help with walking until your leg feels normal.     Over the next 24 to 48 hours, pain at the needle site may increase before it gets better.     For the next 48 hours, use ice packs for 15 minutes, three to four times a day for pain.    If you have a bandage, you may remove it the next morning.     No tub baths, hot tubs or swimming for 48 hours. You may shower the next day.   Activity    Do not drive until morning.     Limit your activity based on your pain level. Follow your doctor s orders for activity.     You may eat a normal diet.     If you had sedation,   - You may feel sleepy, forgetful or unsteady.   - Do not drink alcohol for 24 hours.  Medicines    If you take aspirin or platelet inhibitors, you can restart them tomorrow.     Restart all other medicines today at your regular dose, including Coumadin (warfarin).    If you are restarting Coumadin, talk to your doctor about having your INR " checked.   If you had a steroid shot     The medicine should help reduce swelling and pain. This may take from 7 to 10 days.     Side effects from the shot will be mild and go away in 2 to 3 days. Common side effects may include:  -  Insomnia (trouble sleeping).  -  Heartburn.  -  Flushed face.  -  Water retention (bloating or fluid build-up).  -  Increased appetite (feeling more hungry than usual).  -  Increased blood sugar.  If you have diabetes, watch your blood sugar closely. If needed, call your doctor to help you control your blood sugar.  Some patients will get lasting relief from a single shot. Others may require up to three shots to get results. If you have more than one steroid shot, they should be given two weeks apart.  Some patients do not have relief of symptoms.    Follow-up:   NO FOLLOW-UP NEEDED     Call your doctor or go to the Emergency Room if you have severe pain, fever or problems with bowel or bladder control.     Additional Information About Your Visit       [employer] Information    [employer] gives you secure access to your electronic health record. If you see a primary care provider, you can also send messages to your care team and make appointments. If you have questions, please call your primary care clinic.  If you do not have a primary care provider, please call [phone] and they will assist you.       Care EveryWhere ID    This is your Care EveryWhere ID. This could be used by other organizations to access your [place] medical records  [id number]        Primary Care Provider Office Phone # Fax #    Kory Hudson [phone]      Equal Access to Services    [facility] : Hadii aad ku hadasho Soelíasali, waaxda luqadaha, qaybta kaalmada mena, paola dumont. So [facility] [phone].    ATENCIÓN: Si habla español, tiene a meng disposición servicios gratuitos de asistencia lingüística. Llame al [phone].    We comply with applicable  federal and state civil rights laws, including the [place] Human Rights Act. We do not discriminate on the basis of race, color, creed, Scientologist, national origin, marital status, age, disability, sex, sexual orientation, or gender identity.       Thank you!    Thank you for choosing [facility] for your care. Our goal is always to provide you with excellent care. Hearing back from our patients is one way we can continue to improve our services. Please take a few minutes to complete the written survey that you may receive in the mail after you visit with us. Thank you!            Medication List      Medications          Morning Afternoon Evening Bedtime As Needed    order for DME  INSTRUCTIONS: Shoe horn with extended handle  [facility]  Phone: [phone]  Fax: [phone]                     order for DME  INSTRUCTIONS: Equipment being ordered: CPAP supplies                       ASK your doctor about these medications          Morning Afternoon Evening Bedtime As Needed    albuterol 108 (90 Base) MCG/ACT inhaler  Also known as: Ventolin HFA  INSTRUCTIONS: INHALE 2 PUFFS INTO LUNGS EVERY SIX HOURS AS NEEDED FOR SHORTNESS OF BREATH / DYSPNEA OR WHEEZING                     ARIPiprazole 10 MG tablet  Also known as: ABILIFY                     betamethasone dipropionate 0.05 % external ointment  Also known as: DIPROSONE  INSTRUCTIONS: Apply topically 2 times daily Apply twice daily up to 2 weeks for rash.                     Fiber 625 MG tablet  INSTRUCTIONS: TAKE 2 TABLETS BY MOUTH TWICE A DAY  Doctor's comments: Maximum Refills Reached                     hydrochlorothiazide 12.5 MG tablet  Also known as: HYDRODIURIL  INSTRUCTIONS: TAKE 1 TABLET (12.5MG) BY MOUTH DAILY  Doctor's comments: Maximum Refills Reached                     hydrocortisone 2.5 % cream  INSTRUCTIONS: Apply topically 2 times daily Apply sparing to face for 1-2 weeks.                     ketoconazole 2 % external shampoo  Also known as:  NIZORAL  INSTRUCTIONS: APPLY TO SCALP & BEARD TWICE WEEKLY  WASH OFF AFTER 5 MINUTES  Doctor's comments: Prescription                      lisinopril 10 MG tablet  Also known as: ZESTRIL  INSTRUCTIONS: TAKE 1 TABLET BY MOUTH EVERY MORNING  Doctor's comments: Maximum Refills Reached                     naproxen sodium 220 MG capsule  INSTRUCTIONS: If Tylenol is not helpful, this can be added 2 tabs twice a day with meals.                     ondansetron 4 MG ODT tab  Also known as: ZOFRAN-ODT                     oxybutynin ER 15 MG 24 hr tablet  Also known as: DITROPAN XL  INSTRUCTIONS: Take 30 mg by mouth daily                     pantoprazole 40 MG EC tablet  Also known as: PROTONIX  INSTRUCTIONS: TAKE 1 TABLET BY MOUTH 30-60 MINUTES BEFORE FIRST MEAL OF THE DAY  Doctor's comments: Maximum Refills Reached                     prazosin 1 MG capsule  Also known as: MINIPRESS  INSTRUCTIONS: Take 3 mg by mouth At Bedtime                     * vitamin D 50 MCG (2000 UT) Caps  INSTRUCTIONS: TAKE 1 CAPSULE BY MOUTH DAILY  Doctor's comments: Maximum Refills Reached                     * vitamin D3 50 mcg (2000 units) tablet  Also known as: CHOLECALCIFEROL  INSTRUCTIONS: TAKE 1 TABLET BY MOUTH DAILY  Doctor's comments: Maximum Refills Reached                        * This list has 2 medication(s) that are the same as other medications prescribed for you. Read the directions carefully, and ask your doctor or other care provider to review them with you.

## 2020-12-07 DIAGNOSIS — I10 HYPERTENSION GOAL BP (BLOOD PRESSURE) < 140/90: ICD-10-CM

## 2020-12-07 NOTE — TELEPHONE ENCOUNTER
Routing refill request to provider for review/approval because:  Labs out of range:    BP Readings from Last 3 Encounters:   11/17/20 (!) 149/75   10/28/20 122/79   10/26/20 120/80     Anupama Ross RN, BSN

## 2020-12-08 RX ORDER — LISINOPRIL 10 MG/1
TABLET ORAL
Qty: 30 TABLET | Refills: 1 | Status: SHIPPED | OUTPATIENT
Start: 2020-12-08 | End: 2021-01-20

## 2020-12-09 ENCOUNTER — TELEPHONE (OUTPATIENT)
Dept: FAMILY MEDICINE | Facility: CLINIC | Age: 40
End: 2020-12-09

## 2020-12-09 NOTE — TELEPHONE ENCOUNTER
Reason for Call:  Form, our goal is to have forms completed with 72 hours, however, some forms may require a visit or additional information.    Type of letter, form or note:  Medication     Who is the form from?: Pharmacy (if other please explain)    Where did the form come from: form was faxed in    What clinic location was the form placed at?: [facility]     Where the form was placed: [facility]/Folder    What number is listed as a contact on the form?: [phone]       Additional comments: please sign/ date and fax to [phone]    Call taken on 12/9/2020 at 1:53 PM by Jade Lakhani

## 2020-12-11 ENCOUNTER — TRANSFERRED RECORDS (OUTPATIENT)
Dept: HEALTH INFORMATION MANAGEMENT | Facility: CLINIC | Age: 40
End: 2020-12-11

## 2021-01-15 ENCOUNTER — TRANSFERRED RECORDS (OUTPATIENT)
Dept: HEALTH INFORMATION MANAGEMENT | Facility: CLINIC | Age: 41
End: 2021-01-15

## 2021-01-15 ENCOUNTER — MEDICAL CORRESPONDENCE (OUTPATIENT)
Dept: HEALTH INFORMATION MANAGEMENT | Facility: CLINIC | Age: 41
End: 2021-01-15

## 2021-01-15 ENCOUNTER — HEALTH MAINTENANCE LETTER (OUTPATIENT)
Age: 41
End: 2021-01-15

## 2021-01-20 NOTE — PROGRESS NOTES
Pre-Visit Planning   Next 5 appointments (look out 90 days)    Jan 22, 2021  3:00 PM  (Arrive by 2:35 PM)  Adult Preventative Visit with Kory Hudson MD  [facility] ([facility]) [address] [address] [address] [phone]        Appointment Notes for this encounter:   reviewed JQ // PX     Questionnaires Reviewed/Assigned  Additional questionnaires assigned   PHQ 9    Unable to reach. Left voicemail. Advised patient to call clinic back at [phone] and sent  My Chart     Eliana Concepcion RN

## 2021-01-22 ENCOUNTER — OFFICE VISIT (OUTPATIENT)
Dept: FAMILY MEDICINE | Facility: CLINIC | Age: 41
End: 2021-01-22
Payer: COMMERCIAL

## 2021-01-22 VITALS
WEIGHT: 315 LBS | OXYGEN SATURATION: 97 % | RESPIRATION RATE: 16 BRPM | BODY MASS INDEX: 44.1 KG/M2 | HEIGHT: 71 IN | DIASTOLIC BLOOD PRESSURE: 82 MMHG | HEART RATE: 95 BPM | TEMPERATURE: 99.1 F | SYSTOLIC BLOOD PRESSURE: 126 MMHG

## 2021-01-22 DIAGNOSIS — E55.9 VITAMIN D DEFICIENCY: ICD-10-CM

## 2021-01-22 DIAGNOSIS — I10 HYPERTENSION GOAL BP (BLOOD PRESSURE) < 140/90: ICD-10-CM

## 2021-01-22 DIAGNOSIS — K22.70 BARRETT'S ESOPHAGUS WITHOUT DYSPLASIA: ICD-10-CM

## 2021-01-22 DIAGNOSIS — Z00.00 ROUTINE GENERAL MEDICAL EXAMINATION AT A HEALTH CARE FACILITY: Primary | ICD-10-CM

## 2021-01-22 DIAGNOSIS — Z11.4 ENCOUNTER FOR SCREENING FOR HIV: ICD-10-CM

## 2021-01-22 DIAGNOSIS — Z51.81 MEDICATION MONITORING ENCOUNTER: ICD-10-CM

## 2021-01-22 DIAGNOSIS — R60.0 PERIPHERAL EDEMA: ICD-10-CM

## 2021-01-22 DIAGNOSIS — K21.9 LPRD (LARYNGOPHARYNGEAL REFLUX DISEASE): ICD-10-CM

## 2021-01-22 DIAGNOSIS — K29.80 DUODENITIS: ICD-10-CM

## 2021-01-22 DIAGNOSIS — Z23 NEED FOR PROPHYLACTIC VACCINATION AND INOCULATION AGAINST INFLUENZA: ICD-10-CM

## 2021-01-22 DIAGNOSIS — Z11.59 NEED FOR HEPATITIS C SCREENING TEST: ICD-10-CM

## 2021-01-22 DIAGNOSIS — J45.20 MILD INTERMITTENT ASTHMA WITHOUT COMPLICATION: ICD-10-CM

## 2021-01-22 DIAGNOSIS — F17.200 TOBACCO USE DISORDER: ICD-10-CM

## 2021-01-22 PROCEDURE — 90715 TDAP VACCINE 7 YRS/> IM: CPT | Performed by: FAMILY MEDICINE

## 2021-01-22 PROCEDURE — 82306 VITAMIN D 25 HYDROXY: CPT | Performed by: FAMILY MEDICINE

## 2021-01-22 PROCEDURE — 90732 PPSV23 VACC 2 YRS+ SUBQ/IM: CPT | Performed by: FAMILY MEDICINE

## 2021-01-22 PROCEDURE — 90686 IIV4 VACC NO PRSV 0.5 ML IM: CPT | Performed by: FAMILY MEDICINE

## 2021-01-22 PROCEDURE — 99395 PREV VISIT EST AGE 18-39: CPT | Mod: 25 | Performed by: FAMILY MEDICINE

## 2021-01-22 PROCEDURE — 83735 ASSAY OF MAGNESIUM: CPT | Performed by: FAMILY MEDICINE

## 2021-01-22 PROCEDURE — 90471 IMMUNIZATION ADMIN: CPT | Performed by: FAMILY MEDICINE

## 2021-01-22 PROCEDURE — 86803 HEPATITIS C AB TEST: CPT | Performed by: FAMILY MEDICINE

## 2021-01-22 PROCEDURE — 36415 COLL VENOUS BLD VENIPUNCTURE: CPT | Performed by: FAMILY MEDICINE

## 2021-01-22 PROCEDURE — 82607 VITAMIN B-12: CPT | Performed by: FAMILY MEDICINE

## 2021-01-22 PROCEDURE — 87389 HIV-1 AG W/HIV-1&-2 AB AG IA: CPT | Performed by: FAMILY MEDICINE

## 2021-01-22 PROCEDURE — 90472 IMMUNIZATION ADMIN EACH ADD: CPT | Performed by: FAMILY MEDICINE

## 2021-01-22 RX ORDER — HYDROCHLOROTHIAZIDE 12.5 MG/1
TABLET ORAL
Qty: 90 TABLET | Refills: 3 | Status: SHIPPED | OUTPATIENT
Start: 2021-01-22 | End: 2021-08-27 | Stop reason: DRUGHIGH

## 2021-01-22 RX ORDER — ALBUTEROL SULFATE 90 UG/1
AEROSOL, METERED RESPIRATORY (INHALATION)
Qty: 1 INHALER | Refills: 11 | Status: SHIPPED | OUTPATIENT
Start: 2021-01-22 | End: 2022-03-23

## 2021-01-22 RX ORDER — PANTOPRAZOLE SODIUM 40 MG/1
TABLET, DELAYED RELEASE ORAL
Qty: 90 TABLET | Refills: 3 | Status: SHIPPED | OUTPATIENT
Start: 2021-01-22 | End: 2022-01-19

## 2021-01-22 RX ORDER — LISINOPRIL 10 MG/1
10 TABLET ORAL EVERY MORNING
Qty: 90 TABLET | Refills: 3 | Status: SHIPPED | OUTPATIENT
Start: 2021-01-22 | End: 2022-01-19

## 2021-01-22 ASSESSMENT — ENCOUNTER SYMPTOMS
DIARRHEA: 0
PARESTHESIAS: 0
NAUSEA: 0
NERVOUS/ANXIOUS: 0
SHORTNESS OF BREATH: 0
JOINT SWELLING: 0
HEMATOCHEZIA: 0
SORE THROAT: 0
FREQUENCY: 0
EYE PAIN: 0
MYALGIAS: 0
ABDOMINAL PAIN: 0
COUGH: 0
PALPITATIONS: 0
WEAKNESS: 0
HEADACHES: 0
CONSTIPATION: 0
HEARTBURN: 0
FEVER: 0
CHILLS: 0
DYSURIA: 0
DIZZINESS: 0
HEMATURIA: 0
ARTHRALGIAS: 0

## 2021-01-22 ASSESSMENT — MIFFLIN-ST. JEOR: SCORE: 2656.26

## 2021-01-22 ASSESSMENT — ACTIVITIES OF DAILY LIVING (ADL): CURRENT_FUNCTION: NO ASSISTANCE NEEDED

## 2021-01-22 NOTE — NURSING NOTE
Prior to immunization administration, verified patients identity using patient s name and date of birth. Please see Immunization Activity for additional information.     Screening Questionnaire for Adult Immunization    Are you sick today?   No   Do you have allergies to medications, food, a vaccine component or latex?   No   Have you ever had a serious reaction after receiving a vaccination?   No   Do you have a long-term health problem with heart, lung, kidney, or metabolic disease (e.g., diabetes), asthma, a blood disorder, no spleen, complement component deficiency, a cochlear implant, or a spinal fluid leak?  Are you on long-term aspirin therapy?   No   Do you have cancer, leukemia, HIV/AIDS, or any other immune system problem?   No   Do you have a parent, brother, or sister with an immune system problem?   No   In the past 3 months, have you taken medications that affect  your immune system, such as prednisone, other steroids, or anticancer drugs; drugs for the treatment of rheumatoid arthritis, Crohn s disease, or psoriasis; or have you had radiation treatments?   No   Have you had a seizure, or a brain or other nervous system problem?   No   During the past year, have you received a transfusion of blood or blood    products, or been given immune (gamma) globulin or antiviral drug?   No   For women: Are you pregnant or is there a chance you could become       pregnant during the next month?   No   Have you received any vaccinations in the past 4 weeks?   No     Immunization questionnaire answers were all negative.        Per orders of Dr. shay, injection of pnuvom 23 given by Brandon Gutierrez CMA. Patient instructed to remain in clinic for 15 minutes afterwards, and to report any adverse reaction to me immediately.       Screening performed by Brandon Gutierrez CMA on 1/22/2021 at 3:57 PM.

## 2021-01-22 NOTE — PROGRESS NOTES
"Future Appointments   Date Time Provider Department Center   1/22/2021  3:00 PM Kory Hudson MD [facility]     Appointment Notes for this encounter:   reviewed JQ // PX     Health Maintenance Due   Topic Date Due     ANNUAL REVIEW OF HM ORDERS  1980     Pneumococcal Vaccine: Pediatrics (0 to 5 Years) and At-Risk Patients (6 to 64 Years) (1 of 1 - PPSV23) 11/14/1987     HEPATITIS C SCREENING  11/14/1999     URINE DRUG SCREEN  08/22/2016     ASTHMA ACTION PLAN  03/20/2018     MEDICARE ANNUAL WELLNESS VISIT  07/30/2020     DTAP/TDAP/TD IMMUNIZATION (2 - Td) 11/11/2020     PHQ-9  12/01/2020     Health Maintenance addressed:  PHQ9 and Adacel    Immunizations pended TDAP    MyChart Status:  Active and Using       {PROVIDER CHARTING PREFERENCE:227773}    Subjective     Willard is a 40 year old who presents to clinic today for the following health issues {ACCOMPANIED BY STATEMENT (Optional):001851}    Healthy Habits:     In general, how would you rate your overall health?  Fair    Frequency of exercise:  None    Do you usually eat at least 4 servings of fruit and vegetables a day, include whole grains    & fiber and avoid regularly eating high fat or \"junk\" foods?  No    Taking medications regularly:  Yes    Medication side effects:  None    Ability to successfully perform activities of daily living:  No assistance needed    Home Safety:  No safety concerns identified    Hearing Impairment:  No hearing concerns    In the past 6 months, have you been bothered by leaking of urine? Yes    In general, how would you rate your overall mental or emotional health?  Fair      PHQ-2 Total Score: 2    Additional concerns today:  No         {SUPERLIST (Optional):728726}  {additonal problems for provider to add (Optional):465812}    Review of Systems   Constitutional: Negative for chills and fever.   HENT: Negative for congestion, ear pain, hearing loss and sore throat.    Eyes: Negative for pain and visual disturbance. "   Respiratory: Negative for cough and shortness of breath.    Cardiovascular: Negative for chest pain, palpitations and peripheral edema.   Gastrointestinal: Negative for abdominal pain, constipation, diarrhea, heartburn, hematochezia and nausea.   Genitourinary: Negative for discharge, dysuria, frequency, genital sores, hematuria, impotence and urgency.   Musculoskeletal: Negative for arthralgias, joint swelling and myalgias.   Skin: Negative for rash.   Neurological: Negative for dizziness, weakness, headaches and paresthesias.   Psychiatric/Behavioral: Positive for mood changes. The patient is not nervous/anxious.       {ROS COMP (Optional):576755}      Objective    There were no vitals taken for this visit.  There is no height or weight on file to calculate BMI.  Physical Exam   {Exam List (Optional):735174}    {Diagnostic Test Results (Optional):871907}    {AMBULATORY ATTESTATION (Optional):676086}

## 2021-01-22 NOTE — LETTER
My Asthma Action Plan    Name: Kimo Greenwood   YOB: 1980  Date: 1/22/2021   My doctor: Kory Hudson MD   My clinic: [facility]        My Rescue Medicine:   Albuterol inhaler (Proair/Ventolin/Proventil HFA)  2-4 puffs EVERY 4 HOURS as needed. Use a spacer if recommended by your provider.   My Asthma Severity:   Intermittent / Exercise Induced  Know your asthma triggers             GREEN ZONE   Good Control    I feel good    No cough or wheeze    Can work, sleep and play without asthma symptoms       Take your asthma control medicine every day.     1. If exercise triggers your asthma, take your rescue medication    15 minutes before exercise or sports, and    During exercise if you have asthma symptoms  2. Spacer to use with inhaler: If you have a spacer, make sure to use it with your inhaler             YELLOW ZONE Getting Worse  I have ANY of these:    I do not feel good    Cough or wheeze    Chest feels tight    Wake up at night   1. Keep taking your Green Zone medications  2. Start taking your rescue medicine:    every 20 minutes for up to 1 hour. Then every 4 hours for 24-48 hours.  3. If you stay in the Yellow Zone for more than 12-24 hours, contact your doctor.  4. If you do not return to the Green Zone in 12-24 hours or you get worse, start taking your oral steroid medicine if prescribed by your provider.           RED ZONE Medical Alert - Get Help  I have ANY of these:    I feel awful    Medicine is not helping    Breathing getting harder    Trouble walking or talking    Nose opens wide to breathe       1. Take your rescue medicine NOW  2. If your provider has prescribed an oral steroid medicine, start taking it NOW  3. Call your doctor NOW  4. If you are still in the Red Zone after 20 minutes and you have not reached your doctor:    Take your rescue medicine again and    Call 911 or go to the emergency room right away    See your regular doctor within 2 weeks  of an Emergency Room or Urgent Care visit for follow-up treatment.          Annual Reminders:  Meet with Asthma Educator,  Flu Shot in the Fall, consider Pneumonia Vaccination for patients with asthma (aged 19 and older).    Pharmacy:    [id number] - [place], [place] - [address] AT [place] [address] & [id number] - [place], [place] - [address]    Electronically signed by Kory Hudson MD   Date: 01/22/21                    Asthma Triggers  How To Control Things That Make Your Asthma Worse    Triggers are things that make your asthma worse.  Look at the list below to help you find your triggers and   what you can do about them. You can help prevent asthma flare-ups by staying away from your triggers.      Trigger                                                          What you can do   Cigarette Smoke  Tobacco smoke can make asthma worse. Do not allow smoking in your home, car or around you.  Be sure no one smokes at a child s day care or school.  If you smoke, ask your health care provider for ways to help you quit.  Ask family members to quit too.  Ask your health care provider for a referral to Quit Plan to help you quit smoking, or call 1-111-612-PLAN.     Colds, Flu, Bronchitis  These are common triggers of asthma. Wash your hands often.  Don t touch your eyes, nose or mouth.  Get a flu shot every year.     Dust Mites  These are tiny bugs that live in cloth or carpet. They are too small to see. Wash sheets and blankets in hot water every week.   Encase pillows and mattress in dust mite proof covers.  Avoid having carpet if you can. If you have carpet, vacuum weekly.   Use a dust mask and HEPA vacuum.   Pollen and Outdoor Mold  Some people are allergic to trees, grass, or weed pollen, or molds. Try to keep your windows closed.  Limit time out doors when pollen count is high.   Ask you health care provider about taking medicine during  allergy season.     Animal Dander  Some people are allergic to skin flakes, urine or saliva from pets with fur or feathers. Keep pets with fur or feathers out of your home.    If you can t keep the pet outdoors, then keep the pet out of your bedroom.  Keep the bedroom door closed.  Keep pets off cloth furniture and away from stuffed toys.     Mice, Rats, and Cockroaches  Some people are allergic to the waste from these pests.   Cover food and garbage.  Clean up spills and food crumbs.  Store grease in the refrigerator.   Keep food out of the bedroom.   Indoor Mold  This can be a trigger if your home has high moisture. Fix leaking faucets, pipes, or other sources of water.   Clean moldy surfaces.  Dehumidify basement if it is damp and smelly.   Smoke, Strong Odors, and Sprays  These can reduce air quality. Stay away from strong odors and sprays, such as perfume, powder, hair spray, paints, smoke incense, paint, cleaning products, candles and new carpet.   Exercise or Sports  Some people with asthma have this trigger. Be active!  Ask your doctor about taking medicine before sports or exercise to prevent symptoms.    Warm up for 5-10 minutes before and after sports or exercise.     Other Triggers of Asthma  Cold air:  Cover your nose and mouth with a scarf.  Sometimes laughing or crying can be a trigger.  Some medicines and food can trigger asthma.

## 2021-01-22 NOTE — PROGRESS NOTES
"  SUBJECTIVE:   CC: Kimo Greenwood is an 40 year old male who presents for preventative health visit.     Patient has been advised of split billing requirements and indicates understanding: Yes  Healthy Habits:     In general, how would you rate your overall health?  Fair    Frequency of exercise:  None    Do you usually eat at least 4 servings of fruit and vegetables a day, include whole grains    & fiber and avoid regularly eating high fat or \"junk\" foods?  No    Taking medications regularly:  Yes    Medication side effects:  None    Ability to successfully perform activities of daily living:  No assistance needed    Home Safety:  No safety concerns identified    Hearing Impairment:  No hearing concerns    In the past 6 months, have you been bothered by leaking of urine? Yes    In general, how would you rate your overall mental or emotional health?  Fair      PHQ-2 Total Score: 2    Additional concerns today:  No    History of intermittent asthma, currently controlled.     History of hypertension. Controlled with lisinopril and hydrochlorothiazide.     History of obstructive sleep apnea. Wearing CPAP nightly.     History of overactive bladder with nocturnal enuresis.  Following with urology.  Prior InterStim placement.    History of chronic low back pain.    Patient status post revision left total hip arthroplasty with a head and liner exchange to a biomet duel mobility head and liner on 6/24/2019. Followed by [facility].     History of moderate major depression.  Following with psychiatry currently on Abilify, prazosin.     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    History of tobacco use with ongoing smoking.    Today's PHQ-2 Score:   PHQ-2 ( 1999 Pfizer) 1/22/2021   Q1: Little interest or pleasure in doing things 1   Q2: Feeling down, depressed or hopeless 1   PHQ-2 Score 2   Q1: Little interest or pleasure in doing things Several days   Q2: Feeling down, depressed " "or hopeless Several days   PHQ-2 Score 2       Abuse: Current or Past(Physical, Sexual or Emotional)- No  Do you feel safe in your environment? Yes    Social History     Tobacco Use     Smoking status: Current Every Day Smoker     Packs/day: 1.00     Years: 1.00     Pack years: 1.00     Types: Cigarettes     Smokeless tobacco: Never Used     Tobacco comment: Smokes E Cigs equal to 1 PPD   Substance Use Topics     Alcohol use: Yes     Comment: socially--\"not very often\" \"once a month\"     If you drink alcohol do you typically have >3 drinks per day or >7 drinks per week? No    Alcohol Use 1/22/2021   Prescreen: >3 drinks/day or >7 drinks/week? No   Prescreen: >3 drinks/day or >7 drinks/week? -   AUDIT SCORE  -       Last PSA: No results found for: PSA    Reviewed orders with patient. Reviewed health maintenance and updated orders accordingly - Yes      Reviewed and updated as needed this visit by clinical staff  Tobacco  Allergies  Meds   Med Hx  Surg Hx  Fam Hx  Soc Hx        Reviewed and updated as needed this visit by Provider    Meds             Past Medical History:   Diagnosis Date     Arthritis     DDD hips and knees     Asthma      Chronic pain - left hip/leg pain     degenerative disc disease, back, hips, knees     Gastroesophageal reflux disease      Hypertension      Marijuana abuse      MDD (major depressive disorder)      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    With associated speech/language delay     Obesity 11/11/2010     LOREN (obstructive sleep apnea)     Uses a CPAP     Seborrheic dermatitis       Past Surgical History:   Procedure Laterality Date     ARTHROPLASTY HIP Left 1/25/2017    Procedure: ARTHROPLASTY HIP;  Surgeon: Bala Randall MD;  Location: [facility] OR     ARTHROPLASTY REVISION HIP Left 6/24/2019    Procedure: Revision left total hip arthroplasty with a head and liner exchange to a Biomet dual mobility head and liner.;  Surgeon: Bala Randall MD;  " Location: [facility] OR     BLADDER SURGERY      Ibarra, [facility],Interstem stimulator implant     CLOSED REDUCTION HIP Left 6/10/2019    Procedure: Closed reduction under general anesthesia left recurrent prosthetic hip dislocation;  Surgeon: Rafat Cazares MD;  Location: [facility] OR     COLONOSCOPY N/A 10/30/2015    Procedure: COMBINED COLONOSCOPY, SINGLE OR MULTIPLE BIOPSY/POLYPECTOMY BY BIOPSY;  Surgeon: Alexis Jackson MD;  Location: [facility] GI     COLONOSCOPY N/A 11/17/2016    Procedure: COMBINED COLONOSCOPY, SINGLE OR MULTIPLE BIOPSY/POLYPECTOMY BY BIOPSY;  Surgeon: Cat Huber MD;  Location: [facility] GI     COLONOSCOPY N/A 10/28/2020    Procedure: COLONOSCOPY;  Surgeon: You Kraus MD;  Location:  OR     ESOPHAGOSCOPY, GASTROSCOPY, DUODENOSCOPY (EGD), COMBINED N/A 11/17/2016    Procedure: COMBINED ESOPHAGOSCOPY, GASTROSCOPY, DUODENOSCOPY (EGD), BIOPSY SINGLE OR MULTIPLE;  Surgeon: Cat Huber MD;  Location: [facility]     ESOPHAGOSCOPY, GASTROSCOPY, DUODENOSCOPY (EGD), COMBINED N/A 3/12/2020    Procedure: ESOPHAGOGASTRODUODENOSCOPY WITH BIOPSIES;  Surgeon: You Kraus MD;  Location:  OR     ESOPHAGOSCOPY, GASTROSCOPY, DUODENOSCOPY (EGD), COMBINED N/A 10/28/2020    Procedure: ESOPHAGOGASTRODUODENOSCOPY, WITH BIOPSY;  Surgeon: You Kraus MD;  Location:  OR     EXAM UNDER ANESTHESIA, FULGURATE CONDYLOMA ANUS, COMBINED N/A 12/22/2016    Procedure: COMBINED EXAM UNDER ANESTHESIA, FULGURATE CONDYLOMA ANUS;  Surgeon: Nya Cabello MD;  Location: [facility] OR     GENITOURINARY SURGERY         Review of Systems   Constitutional: Negative for chills and fever.   HENT: Negative for congestion, ear pain, hearing loss and sore throat.    Eyes: Negative for pain and visual disturbance.   Respiratory: Negative for cough and shortness of breath.    Cardiovascular: Negative for chest pain, palpitations and peripheral edema.   Gastrointestinal: Negative for abdominal pain,  "constipation, diarrhea, heartburn, hematochezia and nausea.   Genitourinary: Negative for discharge, dysuria, frequency, genital sores, hematuria, impotence and urgency.   Musculoskeletal: Negative for arthralgias, joint swelling and myalgias.   Skin: Negative for rash.   Neurological: Negative for dizziness, weakness, headaches and paresthesias.   Psychiatric/Behavioral: Positive for mood changes. The patient is not nervous/anxious.        OBJECTIVE:   /82 (BP Location: Right arm, Patient Position: Chair, Cuff Size: Adult Large)   Pulse 95   Temp 99.1  F (37.3  C) (Oral)   Resp 16   Ht 1.803 m (5' 11\")   Wt (!) 171.9 kg (379 lb)   SpO2 97%   BMI 52.86 kg/m      Physical Exam  GENERAL: alert, no distress and obese  EYES: Eyes grossly normal to inspection, PERRL and conjunctivae and sclerae normal  HENT: ear canals and TM's normal, nose and mouth without ulcers or lesions  NECK: no adenopathy, no asymmetry, masses, or scars and thyroid normal to palpation  RESP: lungs clear to auscultation - no rales, rhonchi or wheezes  CV: regular rate and rhythm, normal S1 S2, no S3 or S4, no murmur, click or rub, no peripheral edema and peripheral pulses strong  ABDOMEN: soft, nontender, without hepatosplenomegaly or masses   (male): normal male genitalia without lesions or urethral discharge, no hernia  MS: no gross musculoskeletal defects noted, no edema  SKIN: no suspicious lesions or rashes  NEURO: Normal strength and tone, mentation intact and speech normal  PSYCH: mentation appears normal, affect normal/bright        ASSESSMENT/PLAN:   1. Routine general medical examination at a health care facility    2. Hypertension goal BP (blood pressure) < 140/90  Continue current medication, controlled.  - lisinopril (ZESTRIL) 10 MG tablet; Take 1 tablet (10 mg) by mouth every morning  Dispense: 90 tablet; Refill: 3  - hydrochlorothiazide (HYDRODIURIL) 12.5 MG tablet; TAKE 1 TABLET (12.5MG) BY MOUTH DAILY  Dispense: 90 " tablet; Refill: 3    3. 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  Continue PPI.    4. Peripheral edema  Continue current medication.  - hydrochlorothiazide (HYDRODIURIL) 12.5 MG tablet; TAKE 1 TABLET (12.5MG) BY MOUTH DAILY  Dispense: 90 tablet; Refill: 3    5. Duodenitis  - pantoprazole (PROTONIX) 40 MG EC tablet; 1 tablet 30 - 60 minutes before first meal of the day  Dispense: 90 tablet; Refill: 3    6. LPRD (laryngopharyngeal reflux disease)  - pantoprazole (PROTONIX) 40 MG EC tablet; 1 tablet 30 - 60 minutes before first meal of the day  Dispense: 90 tablet; Refill: 3    7. Need for hepatitis C screening test  - Hepatitis C Screen Reflex to HCV RNA Quant and Genotype    8. Need for prophylactic vaccination and inoculation against influenza  - INFLUENZA VACCINE IM > 6 MONTHS VALENT IIV4 [64291]  - ADMIN 1st VACCINE    9. Tobacco use disorder  Discussed tobacco cessation.  He has been vaping but discussed safer alternative with Nicotrol Inhaler.  - nicotine (NICOTROL) 10 MG inhaler; Use 1 cartridge as needed for urge to smoke by puffing over course of 20min.  Use 6-16 cart/day; reduce number of cart/day over 6-12 weeks.  Dispense: 168 each; Refill: 3    10. Mild intermittent asthma without complication  Currently controlled.  - albuterol (VENTOLIN HFA) 108 (90 Base) MCG/ACT inhaler; INHALE 2 PUFFS INTO LUNGS EVERY SIX HOURS AS NEEDED FOR SHORTNESS OF BREATH / DYSPNEA OR WHEEZING  Dispense: 1 Inhaler; Refill: 11    11. Encounter for screening for HIV  - HIV Antigen Antibody Combo    12. Medication monitoring encounter  - Vitamin B12  - Magnesium  - Vitamin D Deficiency    13. Vitamin D deficiency  - Vitamin D Deficiency    Patient has been advised of split billing requirements and indicates understanding: Yes  COUNSELING:   Reviewed preventive health counseling, as reflected in patient instructions    Estimated body mass index is 52.86 kg/m  as calculated from the  "following:    Height as of this encounter: 1.803 m (5' 11\").    Weight as of this encounter: 171.9 kg (379 lb).     Weight management plan: Discussed healthy diet and exercise guidelines    He reports that he has been smoking cigarettes. He has a 1.00 pack-year smoking history. He has never used smokeless tobacco.  Tobacco Cessation Action Plan:   Pharmacotherapies : other Nicotine replacement      Counseling Resources:  ATP IV Guidelines  Pooled Cohorts Equation Calculator  FRAX Risk Assessment  ICSI Preventive Guidelines  Dietary Guidelines for Americans, 2010  USDA's MyPlate  ASA Prophylaxis  Lung CA Screening    Kory Hudson MD  [facility]    "

## 2021-01-22 NOTE — NURSING NOTE
Prior to immunization administration, verified patients identity using patient s name and date of birth. Please see Immunization Activity for additional information.     Screening Questionnaire for Adult Immunization    Are you sick today?   No   Do you have allergies to medications, food, a vaccine component or latex?   No   Have you ever had a serious reaction after receiving a vaccination?   No   Do you have a long-term health problem with heart, lung, kidney, or metabolic disease (e.g., diabetes), asthma, a blood disorder, no spleen, complement component deficiency, a cochlear implant, or a spinal fluid leak?  Are you on long-term aspirin therapy?   No   Do you have cancer, leukemia, HIV/AIDS, or any other immune system problem?   No   Do you have a parent, brother, or sister with an immune system problem?   No   In the past 3 months, have you taken medications that affect  your immune system, such as prednisone, other steroids, or anticancer drugs; drugs for the treatment of rheumatoid arthritis, Crohn s disease, or psoriasis; or have you had radiation treatments?   No   Have you had a seizure, or a brain or other nervous system problem?   No   During the past year, have you received a transfusion of blood or blood    products, or been given immune (gamma) globulin or antiviral drug?   No   For women: Are you pregnant or is there a chance you could become       pregnant during the next month?   No   Have you received any vaccinations in the past 4 weeks?   No     Immunization questionnaire answers were all negative.        Per orders of Dr. shay, injection of tdap and flu given by Brandon Gutierrez CMA. Patient instructed to remain in clinic for 15 minutes afterwards, and to report any adverse reaction to me immediately.       Screening performed by Brandon Gutierrez CMA on 1/22/2021 at 2:59 PM.

## 2021-01-23 LAB
MAGNESIUM SERPL-MCNC: 2.2 MG/DL (ref 1.6–2.3)
VIT B12 SERPL-MCNC: 303 PG/ML (ref 193–986)

## 2021-01-25 LAB
DEPRECATED CALCIDIOL+CALCIFEROL SERPL-MC: 25 UG/L (ref 20–75)
HCV AB SERPL QL IA: NONREACTIVE
HIV 1+2 AB+HIV1 P24 AG SERPL QL IA: NONREACTIVE

## 2021-02-09 DIAGNOSIS — Z11.59 ENCOUNTER FOR SCREENING FOR OTHER VIRAL DISEASES: ICD-10-CM

## 2021-02-15 DIAGNOSIS — Z11.59 ENCOUNTER FOR SCREENING FOR OTHER VIRAL DISEASES: ICD-10-CM

## 2021-02-15 LAB
LABORATORY COMMENT REPORT: NORMAL
SARS-COV-2 RNA RESP QL NAA+PROBE: NEGATIVE
SARS-COV-2 RNA RESP QL NAA+PROBE: NORMAL
SPECIMEN SOURCE: NORMAL
SPECIMEN SOURCE: NORMAL

## 2021-02-15 PROCEDURE — 99207 PR NO CHARGE LOS: CPT

## 2021-02-15 PROCEDURE — U0003 INFECTIOUS AGENT DETECTION BY NUCLEIC ACID (DNA OR RNA); SEVERE ACUTE RESPIRATORY SYNDROME CORONAVIRUS 2 (SARS-COV-2) (CORONAVIRUS DISEASE [COVID-19]), AMPLIFIED PROBE TECHNIQUE, MAKING USE OF HIGH THROUGHPUT TECHNOLOGIES AS DESCRIBED BY CMS-2020-01-R: HCPCS | Performed by: ORTHOPAEDIC SURGERY

## 2021-02-15 PROCEDURE — U0005 INFEC AGEN DETEC AMPLI PROBE: HCPCS | Performed by: ORTHOPAEDIC SURGERY

## 2021-02-17 ENCOUNTER — HOSPITAL ENCOUNTER (OUTPATIENT)
Dept: GENERAL RADIOLOGY | Facility: CLINIC | Age: 41
Discharge: HOME | End: 2021-02-17
Attending: ORTHOPAEDIC SURGERY | Admitting: ORTHOPAEDIC SURGERY
Payer: COMMERCIAL

## 2021-02-17 VITALS
DIASTOLIC BLOOD PRESSURE: 80 MMHG | OXYGEN SATURATION: 100 % | RESPIRATION RATE: 18 BRPM | SYSTOLIC BLOOD PRESSURE: 130 MMHG | HEART RATE: 83 BPM

## 2021-02-17 DIAGNOSIS — M16.9 HIP OSTEOARTHRITIS: ICD-10-CM

## 2021-02-17 PROCEDURE — 20610 DRAIN/INJ JOINT/BURSA W/O US: CPT | Mod: RT

## 2021-02-17 PROCEDURE — 255N000002 HC RX 255 OP 636: Performed by: PHYSICIAN ASSISTANT

## 2021-02-17 PROCEDURE — 250N000011 HC RX IP 250 OP 636

## 2021-02-17 PROCEDURE — 250N000009 HC RX 250

## 2021-02-17 RX ORDER — BUPIVACAINE HYDROCHLORIDE 5 MG/ML
INJECTION, SOLUTION EPIDURAL; INTRACAUDAL
Status: COMPLETED
Start: 2021-02-17 | End: 2021-02-17

## 2021-02-17 RX ORDER — LIDOCAINE HYDROCHLORIDE 10 MG/ML
10 INJECTION, SOLUTION EPIDURAL; INFILTRATION; INTRACAUDAL; PERINEURAL ONCE
Status: COMPLETED | OUTPATIENT
Start: 2021-02-17 | End: 2021-02-17

## 2021-02-17 RX ORDER — IOPAMIDOL 408 MG/ML
10 INJECTION, SOLUTION INTRATHECAL ONCE
Status: COMPLETED | OUTPATIENT
Start: 2021-02-17 | End: 2021-02-17

## 2021-02-17 RX ORDER — BUPIVACAINE HYDROCHLORIDE 5 MG/ML
40 INJECTION, SOLUTION EPIDURAL; INTRACAUDAL ONCE
Status: COMPLETED | OUTPATIENT
Start: 2021-02-17 | End: 2021-02-17

## 2021-02-17 RX ORDER — LIDOCAINE HYDROCHLORIDE 10 MG/ML
INJECTION, SOLUTION EPIDURAL; INFILTRATION; INTRACAUDAL; PERINEURAL
Status: COMPLETED
Start: 2021-02-17 | End: 2021-02-17

## 2021-02-17 RX ORDER — TRIAMCINOLONE ACETONIDE 40 MG/ML
40 INJECTION, SUSPENSION INTRA-ARTICULAR; INTRAMUSCULAR ONCE
Status: COMPLETED | OUTPATIENT
Start: 2021-02-17 | End: 2021-02-17

## 2021-02-17 RX ORDER — TRIAMCINOLONE ACETONIDE 40 MG/ML
INJECTION, SUSPENSION INTRA-ARTICULAR; INTRAMUSCULAR
Status: COMPLETED
Start: 2021-02-17 | End: 2021-02-17

## 2021-02-17 RX ADMIN — BUPIVACAINE HYDROCHLORIDE 20 MG: 5 INJECTION, SOLUTION EPIDURAL; INTRACAUDAL at 14:59

## 2021-02-17 RX ADMIN — IOPAMIDOL 3 ML: 408 INJECTION, SOLUTION INTRATHECAL at 14:58

## 2021-02-17 RX ADMIN — BUPIVACAINE HYDROCHLORIDE 20 MG: 5 INJECTION, SOLUTION EPIDURAL; INTRACAUDAL; PERINEURAL at 14:59

## 2021-02-17 RX ADMIN — LIDOCAINE HYDROCHLORIDE 5 ML: 10 INJECTION, SOLUTION EPIDURAL; INFILTRATION; INTRACAUDAL; PERINEURAL at 14:35

## 2021-02-17 RX ADMIN — TRIAMCINOLONE ACETONIDE 40 MG: 40 INJECTION, SUSPENSION INTRA-ARTICULAR; INTRAMUSCULAR at 14:58

## 2021-02-17 NOTE — PROGRESS NOTES
Sandhya Trujillo   3/5/2018   Anticoagulation Therapy Visit    Description:  61 year old female   Provider:  Sarah Vaughn MD   Department:  [facility]eds           INR as of 3/5/2018     Today's INR 2.5      Anticoagulation Summary as of 3/5/2018     INR goal 2.0-3.0   Today's INR 2.5   Full instructions 5 mg on Mon, Wed, Fri; 3.75 mg all other days   Next INR check 3/12/2018    Indications   Long-term (current) use of anticoagulants [Z79.01] [Z79.01]  Pulmonary embolism (H) [I26.99]         March 2018 Details    Sun Mon Tue Wed Thu Fri Sat         1               2               3                 4               5      5 mg   See details      6      3.75 mg         7      5 mg         8      3.75 mg         9      5 mg         10      3.75 mg           11      3.75 mg         12            13               14               15               16               17                 18               19               20               21               22               23               24                 25               26               27               28               29               30               31                Date Details   03/05 This INR check       Date of next INR:  3/12/2018         How to take your warfarin dose     To take:  3.75 mg Take 1.5 of the 2.5 mg tablets.    To take:  5 mg Take 1 of the 5 mg tablets.            Patient tolerated right hip steroid injection well by Alberto EDWARDS.  Site Clean Dry and intact, dressing applied with no drainage.  Patient rated pre procedural pain at 9/10 and post pain at 2/10.  Patient home per self, understands written and oral instructions.    Sruthi Barba, RN, BSN

## 2021-02-17 NOTE — PROGRESS NOTES
RADIOLOGY PROCEDURE NOTE  Patient name: Kimo Greenwood  MRN: [id number]  : 1980    Pre-procedure diagnosis: Right hip pain  Post-procedure diagnosis: Same    Procedure Date/Time: 2021  2:22 PM  Procedure: Right hip steroid injection  Estimated blood loss: None  Specimen(s) collected with description: none  The patient tolerated the procedure well with no immediate complications.  Significant findings:none    See imaging dictation for procedural details.    Provider name: Alberto Rodriguez PA-C  Assistant(s):None

## 2021-02-22 ENCOUNTER — TRANSFERRED RECORDS (OUTPATIENT)
Dept: HEALTH INFORMATION MANAGEMENT | Facility: CLINIC | Age: 41
End: 2021-02-22

## 2021-02-25 ENCOUNTER — TRANSFERRED RECORDS (OUTPATIENT)
Dept: HEALTH INFORMATION MANAGEMENT | Facility: CLINIC | Age: 41
End: 2021-02-25

## 2021-03-16 ENCOUNTER — MYC MEDICAL ADVICE (OUTPATIENT)
Dept: FAMILY MEDICINE | Facility: CLINIC | Age: 41
End: 2021-03-16

## 2021-03-16 DIAGNOSIS — S73.005S DISLOCATION OF LEFT HIP, SEQUELA: Primary | ICD-10-CM

## 2021-03-16 RX ORDER — COVID-19 ANTIGEN TEST
KIT MISCELLANEOUS
Qty: 60 CAPSULE | Refills: 3 | Status: SHIPPED | OUTPATIENT
Start: 2021-03-16 | End: 2021-06-23

## 2021-03-16 NOTE — TELEPHONE ENCOUNTER
Routing refill request to provider for review/approval because:  A break in medication        Request to refill Naproxen last filled 2018    Eliana Concepcion RN       .

## 2021-04-01 ENCOUNTER — TRANSFERRED RECORDS (OUTPATIENT)
Dept: HEALTH INFORMATION MANAGEMENT | Facility: CLINIC | Age: 41
End: 2021-04-01

## 2021-04-05 DIAGNOSIS — Z79.899 ENCOUNTER FOR LONG-TERM (CURRENT) USE OF HIGH-RISK MEDICATION: ICD-10-CM

## 2021-04-05 DIAGNOSIS — F33.3 SEVERE RECURRENT MAJOR DEPRESSION WITH PSYCHOTIC FEATURES (H): Primary | ICD-10-CM

## 2021-04-07 ENCOUNTER — TELEPHONE (OUTPATIENT)
Dept: FAMILY MEDICINE | Facility: CLINIC | Age: 41
End: 2021-04-07

## 2021-04-07 NOTE — TELEPHONE ENCOUNTER
2 page fax came in from [facility] asking for additional information supporting why patient needs a lift chair and commode, tried to find information, not sure where to look, other than printing last OV with PCP if that has anything in there.     Please let me know easy way of finding information           Jade Lakhani/

## 2021-04-22 ENCOUNTER — IMMUNIZATION (OUTPATIENT)
Dept: NURSING | Facility: CLINIC | Age: 41
End: 2021-04-22
Payer: COMMERCIAL

## 2021-04-22 PROCEDURE — 0001A PR COVID VAC PFIZER DIL RECON 30 MCG/0.3 ML IM: CPT

## 2021-04-22 PROCEDURE — 91300 PR COVID VAC PFIZER DIL RECON 30 MCG/0.3 ML IM: CPT

## 2021-05-03 ENCOUNTER — TRANSFERRED RECORDS (OUTPATIENT)
Dept: HEALTH INFORMATION MANAGEMENT | Facility: CLINIC | Age: 41
End: 2021-05-03

## 2021-05-11 ENCOUNTER — TRANSFERRED RECORDS (OUTPATIENT)
Dept: HEALTH INFORMATION MANAGEMENT | Facility: CLINIC | Age: 41
End: 2021-05-11

## 2021-05-13 ENCOUNTER — IMMUNIZATION (OUTPATIENT)
Dept: NURSING | Facility: CLINIC | Age: 41
End: 2021-05-13
Attending: INTERNAL MEDICINE
Payer: COMMERCIAL

## 2021-05-13 PROCEDURE — 0002A PR COVID VAC PFIZER DIL RECON 30 MCG/0.3 ML IM: CPT

## 2021-05-13 PROCEDURE — 91300 PR COVID VAC PFIZER DIL RECON 30 MCG/0.3 ML IM: CPT

## 2021-05-14 ENCOUNTER — E-VISIT (OUTPATIENT)
Dept: URGENT CARE | Facility: CLINIC | Age: 41
End: 2021-05-14
Payer: COMMERCIAL

## 2021-05-14 DIAGNOSIS — L03.019 PARONYCHIA OF FINGER, UNSPECIFIED LATERALITY: Primary | ICD-10-CM

## 2021-05-14 PROCEDURE — 99421 OL DIG E/M SVC 5-10 MIN: CPT | Performed by: NURSE PRACTITIONER

## 2021-05-14 RX ORDER — ARIPIPRAZOLE 15 MG/1
TABLET ORAL
COMMUNITY
Start: 2021-04-21 | End: 2021-08-27

## 2021-05-14 RX ORDER — DULOXETIN HYDROCHLORIDE 60 MG/1
120 CAPSULE, DELAYED RELEASE ORAL DAILY
COMMUNITY
Start: 2021-04-25

## 2021-05-14 NOTE — PATIENT INSTRUCTIONS
Dear Kimo Greenwood    This is a paronychia (infection near the nailbed). AT this point the treatment is to soak your hand in warm water for 20 minutes 3-4 times a day, pat dry, put antibiotic ointment on like bacitracin or neosporin and cover with bandage. This should resolve this problem within 3-4 days. If not better, or you notice the development of a white or yellowish pocket within the red area please go to Urgent Care in person to be seen. At that point it may need to be drained.    Thanks for choosing us as your health care partner,    Rachel Lua, CNP

## 2021-05-25 DIAGNOSIS — Z11.59 ENCOUNTER FOR SCREENING FOR OTHER VIRAL DISEASES: ICD-10-CM

## 2021-05-29 DIAGNOSIS — Z11.59 ENCOUNTER FOR SCREENING FOR OTHER VIRAL DISEASES: ICD-10-CM

## 2021-05-29 PROCEDURE — U0005 INFEC AGEN DETEC AMPLI PROBE: HCPCS | Performed by: ORTHOPAEDIC SURGERY

## 2021-05-29 PROCEDURE — U0003 INFECTIOUS AGENT DETECTION BY NUCLEIC ACID (DNA OR RNA); SEVERE ACUTE RESPIRATORY SYNDROME CORONAVIRUS 2 (SARS-COV-2) (CORONAVIRUS DISEASE [COVID-19]), AMPLIFIED PROBE TECHNIQUE, MAKING USE OF HIGH THROUGHPUT TECHNOLOGIES AS DESCRIBED BY CMS-2020-01-R: HCPCS | Performed by: ORTHOPAEDIC SURGERY

## 2021-06-01 ENCOUNTER — TRANSFERRED RECORDS (OUTPATIENT)
Dept: HEALTH INFORMATION MANAGEMENT | Facility: CLINIC | Age: 41
End: 2021-06-01

## 2021-06-02 ENCOUNTER — HOSPITAL ENCOUNTER (OUTPATIENT)
Dept: GENERAL RADIOLOGY | Facility: CLINIC | Age: 41
Discharge: HOME | End: 2021-06-02
Attending: ORTHOPAEDIC SURGERY | Admitting: ORTHOPAEDIC SURGERY
Payer: COMMERCIAL

## 2021-06-02 VITALS
WEIGHT: 315 LBS | BODY MASS INDEX: 44.1 KG/M2 | HEIGHT: 71 IN | BODY MASS INDEX: 44.1 KG/M2 | HEIGHT: 71 IN | WEIGHT: 315 LBS

## 2021-06-02 VITALS — SYSTOLIC BLOOD PRESSURE: 143 MMHG | DIASTOLIC BLOOD PRESSURE: 83 MMHG | HEART RATE: 91 BPM

## 2021-06-02 DIAGNOSIS — M16.9 HIP OSTEOARTHRITIS: ICD-10-CM

## 2021-06-02 PROCEDURE — 255N000002 HC RX 255 OP 636: Performed by: PHYSICIAN ASSISTANT

## 2021-06-02 PROCEDURE — 20610 DRAIN/INJ JOINT/BURSA W/O US: CPT | Mod: RT

## 2021-06-02 PROCEDURE — 250N000011 HC RX IP 250 OP 636

## 2021-06-02 PROCEDURE — 250N000009 HC RX 250

## 2021-06-02 RX ORDER — IOPAMIDOL 408 MG/ML
10 INJECTION, SOLUTION INTRATHECAL ONCE
Status: COMPLETED | OUTPATIENT
Start: 2021-06-02 | End: 2021-06-02

## 2021-06-02 RX ORDER — TRIAMCINOLONE ACETONIDE 40 MG/ML
40 INJECTION, SUSPENSION INTRA-ARTICULAR; INTRAMUSCULAR ONCE
Status: COMPLETED | OUTPATIENT
Start: 2021-06-02 | End: 2021-06-02

## 2021-06-02 RX ORDER — TRIAMCINOLONE ACETONIDE 40 MG/ML
INJECTION, SUSPENSION INTRA-ARTICULAR; INTRAMUSCULAR
Status: COMPLETED
Start: 2021-06-02 | End: 2021-06-02

## 2021-06-02 RX ORDER — LIDOCAINE HYDROCHLORIDE 10 MG/ML
5 INJECTION, SOLUTION EPIDURAL; INFILTRATION; INTRACAUDAL; PERINEURAL ONCE
Status: COMPLETED | OUTPATIENT
Start: 2021-06-02 | End: 2021-06-02

## 2021-06-02 RX ORDER — LIDOCAINE HYDROCHLORIDE 10 MG/ML
INJECTION, SOLUTION EPIDURAL; INFILTRATION; INTRACAUDAL; PERINEURAL
Status: COMPLETED
Start: 2021-06-02 | End: 2021-06-02

## 2021-06-02 RX ORDER — BUPIVACAINE HYDROCHLORIDE 5 MG/ML
INJECTION, SOLUTION EPIDURAL; INTRACAUDAL
Status: COMPLETED
Start: 2021-06-02 | End: 2021-06-02

## 2021-06-02 RX ORDER — BUPIVACAINE HYDROCHLORIDE 5 MG/ML
4 INJECTION, SOLUTION EPIDURAL; INTRACAUDAL ONCE
Status: COMPLETED | OUTPATIENT
Start: 2021-06-02 | End: 2021-06-02

## 2021-06-02 RX ADMIN — IOPAMIDOL 1 ML: 408 INJECTION, SOLUTION INTRATHECAL at 14:01

## 2021-06-02 RX ADMIN — TRIAMCINOLONE ACETONIDE 40 MG: 40 INJECTION, SUSPENSION INTRA-ARTICULAR; INTRAMUSCULAR at 14:00

## 2021-06-02 RX ADMIN — BUPIVACAINE HYDROCHLORIDE 15 MG: 5 INJECTION, SOLUTION EPIDURAL; INTRACAUDAL at 14:06

## 2021-06-02 RX ADMIN — BUPIVACAINE HYDROCHLORIDE 15 MG: 5 INJECTION, SOLUTION EPIDURAL; INTRACAUDAL; PERINEURAL at 14:06

## 2021-06-02 RX ADMIN — LIDOCAINE HYDROCHLORIDE 5 ML: 10 INJECTION, SOLUTION EPIDURAL; INFILTRATION; INTRACAUDAL; PERINEURAL at 14:00

## 2021-06-02 NOTE — PROGRESS NOTES
Pt was in Radiology today for a right hip steroid injection. Pt tolerated ortho procedure well. Pre procedure pain was 10 post procedure pain level 2.Procedure was completed by TERRANCE EDWARDS. There were no complications during this procedure. Pt verbalized understanding of written and verbal instructions and left department in stable and satisfactory condition. There is no evidence of bleeding or any other complications upon discharge.

## 2021-06-02 NOTE — PROGRESS NOTES
RADIOLOGY PROCEDURE NOTE  Patient name: Kimo Greenwood  MRN: [id number]  : 1980    Pre-procedure diagnosis: Right hip pain  Post-procedure diagnosis: Same    Procedure Date/Time: 2021  2:16 PM  Procedure: Right hip steroid injection  Estimated blood loss: None  Specimen(s) collected with description: none  The patient tolerated the procedure well with no immediate complications.  Significant findings:none    See imaging dictation for procedural details.    Provider name: Alberto Rodriguez PA-C  Assistant(s):None

## 2021-06-04 ENCOUNTER — TELEPHONE (OUTPATIENT)
Dept: FAMILY MEDICINE | Facility: CLINIC | Age: 41
End: 2021-06-04

## 2021-06-04 ENCOUNTER — MYC MEDICAL ADVICE (OUTPATIENT)
Dept: FAMILY MEDICINE | Facility: CLINIC | Age: 41
End: 2021-06-04

## 2021-06-04 VITALS — BODY MASS INDEX: 44.1 KG/M2 | WEIGHT: 315 LBS | HEIGHT: 71 IN

## 2021-06-04 NOTE — TELEPHONE ENCOUNTER
Pt's sister calling -  Pt is having worsening mental health issues and they are admitting him to [facility] for mental health crisis    They need forms filled out ASAP as he is going in on Monday am.     Sister will upload to my chart.     Form in [facility] office     Eliana Concepcion RN

## 2021-06-05 NOTE — ANESTHESIA CARE TRANSFER NOTE
Last vitals:   Vitals:    01/08/20 1050   BP: 143/68   Pulse: 65   Resp: 18   Temp: 36.8  C (98.2  F)   SpO2: 99%     Patient's level of consciousness is awake  Spontaneous respirations: yes  Maintains airway independently: yes  Dentition unchanged: yes  Oropharynx: oropharynx clear of all foreign objects    QCDR Measures:  ASA# 20 - Surgical Safety Checklist: WHO surgical safety checklist completed prior to induction    PQRS# 430 - Adult PONV Prevention: 4558F-8P - Pt did NOT receive => 2 anti-emetic agents  ASA# 8 - Peds PONV Prevention: NA - Not pediatric patient, not GA or 2 or more risk factors NOT present  PQRS# 424 - Bernie-op Temp Management: 4559F - At least one body temp DOCUMENTED => 35.5C or 95.9F within required timeframe  PQRS# 426 - PACU Transfer Protocol: - Transfer of care checklist used  ASA# 14 - Acute Post-op Pain: ASA14B - Patient did NOT experience pain >= 7 out of 10

## 2021-06-05 NOTE — ANESTHESIA PREPROCEDURE EVALUATION
Anesthesia Evaluation      History of anesthetic complications (wild wake up after GA hip)     Airway   Mallampati: III  Neck ROM: full   Pulmonary     breath sounds clear to auscultation  (+) asthma  sleep apnea on CPAP, , a smoker  (-) pneumonia, shortness of breath, recent URI                         Cardiovascular   Exercise tolerance: > or = 4 METS  (+) hypertension, ,     Rhythm: regular  Rate: normal,         Neuro/Psych    (+) depression, chronic pain  (-) no seizures, no CVA    Endo/Other    (-) no diabetes     GI/Hepatic/Renal    (+) GERD well controlled,             Dental    (+) caps                       Anesthesia Plan  Planned anesthetic: MAC    ASA 3     Anesthetic plan and risks discussed with: patient  Anesthesia plan special considerations: antiemetics,   Post-op plan: routine recovery

## 2021-06-05 NOTE — ANESTHESIA POSTPROCEDURE EVALUATION
Patient: Kimo Greenwood  NEW INTERSTIM LEAD, REPLACE OLD; NEW INTERSTIM BATTERY, REPLACE OLD  Anesthesia type: MAC    Patient location: PACU  Last vitals:   Vitals Value Taken Time   /68 1/8/2020 11:00 AM   Temp 36.8  C (98.2  F) 1/8/2020 10:50 AM   Pulse 63 1/8/2020 11:19 AM   Resp 18 1/8/2020 11:00 AM   SpO2 99 % 1/8/2020 11:19 AM   Vitals shown include unvalidated device data.  Post vital signs: stable  Level of consciousness: awake and responds to simple questions  Post-anesthesia pain: pain controlled  Post-anesthesia nausea and vomiting: no  Pulmonary: unassisted, return to baseline  Cardiovascular: stable and blood pressure at baseline  Hydration: adequate  Anesthetic events: no    QCDR Measures:  ASA# 11 - Bernie-op Cardiac Arrest: ASA11B - Patient did NOT experience unanticipated cardiac arrest  ASA# 12 - Bernie-op Mortality Rate: ASA12B - Patient did NOT die  ASA# 13 - PACU Re-Intubation Rate: NA - No ETT / LMA used for case  ASA# 10 - Composite Anes Safety: ASA10A - No serious adverse event    Additional Notes:

## 2021-06-08 DIAGNOSIS — K22.70 BARRETT'S ESOPHAGUS DETERMINED BY BIOPSY: ICD-10-CM

## 2021-06-08 DIAGNOSIS — I10 HYPERTENSION GOAL BP (BLOOD PRESSURE) < 140/90: ICD-10-CM

## 2021-06-08 DIAGNOSIS — Z79.899 OTHER LONG TERM (CURRENT) DRUG THERAPY: ICD-10-CM

## 2021-06-08 DIAGNOSIS — F33.3 SEVERE RECURRENT MAJOR DEPRESSION WITH PSYCHOTIC FEATURES (H): Primary | ICD-10-CM

## 2021-06-08 DIAGNOSIS — F06.8 OTHER SPECIFIED MENTAL DISORDERS DUE TO KNOWN PHYSIOLOGICAL CONDITION: ICD-10-CM

## 2021-06-09 DIAGNOSIS — F06.8 OTHER SPECIFIED MENTAL DISORDERS DUE TO KNOWN PHYSIOLOGICAL CONDITION: ICD-10-CM

## 2021-06-09 DIAGNOSIS — F33.3 SEVERE RECURRENT MAJOR DEPRESSION WITH PSYCHOTIC FEATURES (H): ICD-10-CM

## 2021-06-09 DIAGNOSIS — Z79.899 OTHER LONG TERM (CURRENT) DRUG THERAPY: ICD-10-CM

## 2021-06-09 LAB
CHOLEST SERPL-MCNC: 160 MG/DL
GLUCOSE SERPL-MCNC: 99 MG/DL (ref 70–99)
HDLC SERPL-MCNC: 53 MG/DL
LDLC SERPL CALC-MCNC: 94 MG/DL
NONHDLC SERPL-MCNC: 107 MG/DL
TRIGL SERPL-MCNC: 64 MG/DL

## 2021-06-09 PROCEDURE — 82947 ASSAY GLUCOSE BLOOD QUANT: CPT | Performed by: NURSE PRACTITIONER

## 2021-06-09 PROCEDURE — 36415 COLL VENOUS BLD VENIPUNCTURE: CPT | Performed by: NURSE PRACTITIONER

## 2021-06-09 PROCEDURE — 80061 LIPID PANEL: CPT | Performed by: NURSE PRACTITIONER

## 2021-06-16 ENCOUNTER — TRANSFERRED RECORDS (OUTPATIENT)
Dept: HEALTH INFORMATION MANAGEMENT | Facility: CLINIC | Age: 41
End: 2021-06-16

## 2021-06-23 ENCOUNTER — TRANSFERRED RECORDS (OUTPATIENT)
Dept: HEALTH INFORMATION MANAGEMENT | Facility: CLINIC | Age: 41
End: 2021-06-23

## 2021-06-23 NOTE — ANESTHESIA PREPROCEDURE EVALUATION
Anesthesia Evaluation      Patient summary reviewed   History of anesthetic complications (Wild wake up)     Airway   Mallampati: II  Neck ROM: full  Comment: Bearded   Pulmonary - normal exam    breath sounds clear to auscultation  (+) asthma  mild,  well controlled, sleep apnea on CPAP, moderate, a smoker (Tobacco and marijuana)                         Cardiovascular - normal exam  (+) hypertension well controlled, ,     ECG reviewed (NSR)  Rhythm: regular  Rate: normal,         Neuro/Psych    (+) depression,     Comments: Mental retardation    Endo/Other    (+) obesity (BMI 52),      GI/Hepatic/Renal    (+) GERD well controlled,             Dental - normal exam                        Anesthesia Plan  Planned anesthetic: MAC  Instructed to use inhalers preop  ASA 3   Induction: intravenous   Anesthetic plan and risks discussed with: patient and parent/guardian    Post-op plan: routine recovery

## 2021-06-23 NOTE — OP NOTE
Operative Note    Name:  Kimo Greenwood  Location: [facility] Main OR  Procedure Date:  1/16/2019  PCP:  Kory Hudson MD      CYSTOSCOPY BOTOX BLADDER INJECTIONS (100 UNITS IN 10CC) (N/A)    Pre-Procedure Diagnosis:    Urge incontinence [N39.41]   Overactive Bladder    Post-Procedure Diagnosis:    * No post-op diagnosis entered *    Surgeon(s):  Didier Ibarra MD    Findings:    100 units botox in 10cc    Estimated Blood Loss:   * No blood loss documented between In Room and Out of Room log events - 1/16/2019  7:30 AM to 1/16/2019  7:59 AM *    Specimens:    [unfilled]       Drains:        Implants:  * No implants in log *    Complications:    None    Narrative:  The patient was brought back into the operative suite after receiving preoperative antibiotic prophylaxis. Then the patient was then placed under anesthesia by the anesthesia team,  positioned in modified lithotomy on the operative table with all pressure points appropriately padded, and prepped and draped in sterile fashion.  A time-out was performed, with patient, procedure, and procedure location correctly identified by all operative personnel.     A  rigid cystoscope was used to enter the bladder. Inspection of the bladder did not reveal any abnormalities. Using a  Laborie needle, Botox mixed with methylene blue was injected in approximately 10-15 locations in the bladder. The trigone was excluded. After injections were complete, the bladder was emptied.     All instrument and sponge counts were correct prior to procedure termination. The patient tolerated the procedure without complication and after anesthesia reversal, was transferred to recovery in stable condition.       Didier Ibarra     Date: 1/16/2019  Time: 7:59 AM

## 2021-06-23 NOTE — ANESTHESIA POSTPROCEDURE EVALUATION
Patient: Kimo Greenwood  CYSTOSCOPY BOTOX BLADDER INJECTIONS (100 UNITS IN 10CC)  Anesthesia type: MAC    Patient location: Phase II Recovery  Last vitals:   Vitals:    01/16/19 0830   BP: 112/59   Pulse: 78   Resp: 18   Temp: 36.6  C (97.8  F)   SpO2: 98%     Post vital signs: stable  Level of consciousness: awake and responds to simple questions  Post-anesthesia pain: pain controlled  Post-anesthesia nausea and vomiting: no  Pulmonary: unassisted, return to baseline  Cardiovascular: stable and blood pressure at baseline  Hydration: adequate  Anesthetic events: no    QCDR Measures:  ASA# 11 - Bernie-op Cardiac Arrest: ASA11B - Patient did NOT experience unanticipated cardiac arrest  ASA# 12 - Bernie-op Mortality Rate: ASA12B - Patient did NOT die  ASA# 13 - PACU Re-Intubation Rate: NA - No ETT / LMA used for case  ASA# 10 - Composite Anes Safety: ASA10A - No serious adverse event    Additional Notes:

## 2021-06-23 NOTE — ANESTHESIA CARE TRANSFER NOTE
Last vitals:   Vitals:    01/16/19 0806   BP: 114/63   Pulse: 73   Resp: 16   Temp: 36.3  C (97.4  F)   SpO2: 100%     Patient's level of consciousness is drowsy  Spontaneous respirations: yes  Maintains airway independently: yes  Dentition unchanged: yes  Oropharynx: oropharynx clear of all foreign objects    QCDR Measures:  ASA# 20 - Surgical Safety Checklist: WHO surgical safety checklist completed prior to induction    PQRS# 430 - Adult PONV Prevention: 4558F - Pt received => 2 anti-emetic agents (different classes) preop & intraop  ASA# 8 - Peds PONV Prevention: NA - Not pediatric patient, not GA or 2 or more risk factors NOT present  PQRS# 424 - Bernie-op Temp Management: 4559F - At least one body temp DOCUMENTED => 35.5C or 95.9F within required timeframe  PQRS# 426 - PACU Transfer Protocol: - Transfer of care checklist used  ASA# 14 - Acute Post-op Pain: ASA14B - Patient did NOT experience pain >= 7 out of 10

## 2021-06-24 ENCOUNTER — MYC MEDICAL ADVICE (OUTPATIENT)
Dept: FAMILY MEDICINE | Facility: CLINIC | Age: 41
End: 2021-06-24

## 2021-06-24 DIAGNOSIS — K64.4 EXTERNAL HEMORRHOIDS: Primary | ICD-10-CM

## 2021-06-29 NOTE — TELEPHONE ENCOUNTER
Ok for hemorrhoid cream to try, which [facility] in [place] - there are 2?    Should have appointment to discuss sleep issues and review medication if not sleeping.  If he is using prazosin that is supposed to help with sleep disturbance related to PTSD and it may be that is the issue and med needs increase - I'm not sure who prescribed this for him as it is a reported med.

## 2021-06-30 RX ORDER — HYDROCORTISONE 25 MG/G
CREAM TOPICAL 2 TIMES DAILY PRN
Qty: 30 G | Refills: 3 | Status: SHIPPED | OUTPATIENT
Start: 2021-06-30 | End: 2021-09-01

## 2021-07-20 ENCOUNTER — MYC MEDICAL ADVICE (OUTPATIENT)
Dept: FAMILY MEDICINE | Facility: CLINIC | Age: 41
End: 2021-07-20

## 2021-08-04 ENCOUNTER — TRANSFERRED RECORDS (OUTPATIENT)
Dept: HEALTH INFORMATION MANAGEMENT | Facility: CLINIC | Age: 41
End: 2021-08-04

## 2021-08-18 NOTE — PROGRESS NOTES
Pre-Visit Planning   Next 5 appointments (look out 90 days)    Aug 27, 2021 11:00 AM  (Arrive by 10:40 AM)  Office Visit with Kory Hudson MD  [facility] ([facility] ) [address] [facility] [address] [phone]        Appointment Notes for this encounter: 6 month f/u -  leg swelling  needs ACT    Questionnaires Reviewed/Assigned PHQ     Unable to reach. Left voicemail. Advised patient to call clinic back at [phone].   Also sent my chart.     Eliana Concepcion RN, [facility]   Phone [phone]   Fax [phone]

## 2021-08-19 DIAGNOSIS — E55.9 VITAMIN D DEFICIENCY: ICD-10-CM

## 2021-08-19 RX ORDER — CHOLECALCIFEROL (VITAMIN D3) 50 MCG
TABLET ORAL
Qty: 84 TABLET | Refills: 0 | Status: SHIPPED | OUTPATIENT
Start: 2021-08-19 | End: 2021-10-20

## 2021-08-27 ENCOUNTER — OFFICE VISIT (OUTPATIENT)
Dept: FAMILY MEDICINE | Facility: CLINIC | Age: 41
End: 2021-08-27
Payer: COMMERCIAL

## 2021-08-27 ENCOUNTER — TELEPHONE (OUTPATIENT)
Dept: FAMILY MEDICINE | Facility: CLINIC | Age: 41
End: 2021-08-27

## 2021-08-27 VITALS
HEART RATE: 86 BPM | BODY MASS INDEX: 44.1 KG/M2 | SYSTOLIC BLOOD PRESSURE: 122 MMHG | RESPIRATION RATE: 16 BRPM | TEMPERATURE: 98.7 F | OXYGEN SATURATION: 96 % | HEIGHT: 71 IN | WEIGHT: 315 LBS | DIASTOLIC BLOOD PRESSURE: 78 MMHG

## 2021-08-27 DIAGNOSIS — K22.70 BARRETT'S ESOPHAGUS DETERMINED BY BIOPSY: ICD-10-CM

## 2021-08-27 DIAGNOSIS — I10 ESSENTIAL HYPERTENSION: Primary | ICD-10-CM

## 2021-08-27 DIAGNOSIS — G47.33 OSA (OBSTRUCTIVE SLEEP APNEA): ICD-10-CM

## 2021-08-27 DIAGNOSIS — N32.81 OVERACTIVE BLADDER: ICD-10-CM

## 2021-08-27 DIAGNOSIS — F17.200 TOBACCO USE DISORDER: ICD-10-CM

## 2021-08-27 DIAGNOSIS — I87.2 VENOUS STASIS DERMATITIS OF BOTH LOWER EXTREMITIES: ICD-10-CM

## 2021-08-27 DIAGNOSIS — I87.2 CHRONIC VENOUS INSUFFICIENCY: ICD-10-CM

## 2021-08-27 DIAGNOSIS — E66.01 MORBID OBESITY (H): ICD-10-CM

## 2021-08-27 DIAGNOSIS — R73.09 OTHER ABNORMAL GLUCOSE: ICD-10-CM

## 2021-08-27 DIAGNOSIS — J45.20 MILD INTERMITTENT ASTHMA WITHOUT COMPLICATION: ICD-10-CM

## 2021-08-27 LAB
ERYTHROCYTE [DISTWIDTH] IN BLOOD BY AUTOMATED COUNT: 13.5 % (ref 10–15)
HBA1C MFR BLD: 5.1 % (ref 0–5.6)
HCT VFR BLD AUTO: 37.7 % (ref 40–53)
HGB BLD-MCNC: 12.5 G/DL (ref 13.3–17.7)
MCH RBC QN AUTO: 28.5 PG (ref 26.5–33)
MCHC RBC AUTO-ENTMCNC: 33.2 G/DL (ref 31.5–36.5)
MCV RBC AUTO: 86 FL (ref 78–100)
PLATELET # BLD AUTO: 258 10E3/UL (ref 150–450)
RBC # BLD AUTO: 4.39 10E6/UL (ref 4.4–5.9)
WBC # BLD AUTO: 11.7 10E3/UL (ref 4–11)

## 2021-08-27 PROCEDURE — 99214 OFFICE O/P EST MOD 30 MIN: CPT | Performed by: FAMILY MEDICINE

## 2021-08-27 PROCEDURE — 80053 COMPREHEN METABOLIC PANEL: CPT | Performed by: FAMILY MEDICINE

## 2021-08-27 PROCEDURE — 85027 COMPLETE CBC AUTOMATED: CPT | Performed by: FAMILY MEDICINE

## 2021-08-27 PROCEDURE — 83036 HEMOGLOBIN GLYCOSYLATED A1C: CPT | Performed by: FAMILY MEDICINE

## 2021-08-27 PROCEDURE — 36415 COLL VENOUS BLD VENIPUNCTURE: CPT | Performed by: FAMILY MEDICINE

## 2021-08-27 RX ORDER — OLANZAPINE 10 MG/1
7.5 TABLET ORAL AT BEDTIME
COMMUNITY
Start: 2021-07-02 | End: 2021-10-06 | Stop reason: DRUGHIGH

## 2021-08-27 RX ORDER — TOPIRAMATE 25 MG/1
TABLET, FILM COATED ORAL
COMMUNITY
Start: 2021-07-21 | End: 2021-10-08

## 2021-08-27 RX ORDER — HYDROCHLOROTHIAZIDE 25 MG/1
25 TABLET ORAL DAILY
Qty: 90 TABLET | Refills: 3 | Status: SHIPPED | OUTPATIENT
Start: 2021-08-27 | End: 2022-08-26

## 2021-08-27 ASSESSMENT — ASTHMA QUESTIONNAIRES
QUESTION_2 LAST FOUR WEEKS HOW OFTEN HAVE YOU HAD SHORTNESS OF BREATH: NOT AT ALL
ACT_TOTALSCORE: 25
QUESTION_1 LAST FOUR WEEKS HOW MUCH OF THE TIME DID YOUR ASTHMA KEEP YOU FROM GETTING AS MUCH DONE AT WORK, SCHOOL OR AT HOME: NONE OF THE TIME
QUESTION_3 LAST FOUR WEEKS HOW OFTEN DID YOUR ASTHMA SYMPTOMS (WHEEZING, COUGHING, SHORTNESS OF BREATH, CHEST TIGHTNESS OR PAIN) WAKE YOU UP AT NIGHT OR EARLIER THAN USUAL IN THE MORNING: NOT AT ALL
QUESTION_5 LAST FOUR WEEKS HOW WOULD YOU RATE YOUR ASTHMA CONTROL: COMPLETELY CONTROLLED
QUESTION_4 LAST FOUR WEEKS HOW OFTEN HAVE YOU USED YOUR RESCUE INHALER OR NEBULIZER MEDICATION (SUCH AS ALBUTEROL): NOT AT ALL

## 2021-08-27 ASSESSMENT — PATIENT HEALTH QUESTIONNAIRE - PHQ9
SUM OF ALL RESPONSES TO PHQ QUESTIONS 1-9: 0
SUM OF ALL RESPONSES TO PHQ QUESTIONS 1-9: 0
10. IF YOU CHECKED OFF ANY PROBLEMS, HOW DIFFICULT HAVE THESE PROBLEMS MADE IT FOR YOU TO DO YOUR WORK, TAKE CARE OF THINGS AT HOME, OR GET ALONG WITH OTHER PEOPLE: NOT DIFFICULT AT ALL

## 2021-08-27 ASSESSMENT — MIFFLIN-ST. JEOR: SCORE: 2678.94

## 2021-08-27 NOTE — PROGRESS NOTES
Assessment & Plan     Essential hypertension  Currently well controlled with lisinopril and hydrochlorothiazide.  Discussed increasing dose to 25 mg daily as this may help with lower extremity edema issues and patient and mother agree and will monitor blood pressure.  - Comprehensive metabolic panel (BMP + Alb, Alk Phos, ALT, AST, Total. Bili, TP)  - Hemoglobin A1c    Venous stasis dermatitis of both lower extremities  Discussed elevating legs, compression socks, walking regularly.  Continue to monitor.  Previously in outside urgent care had lab work including a D-dimer reportedly that was normal.    Chronic venous insufficiency    Patel's esophagus determined by biopsy  Continue PPI.    Mild intermittent asthma without complication  Albuterol as needed.    Morbid obesity (H)  Discussed importance of weight loss so that patient can have eventual hip replacement to stay active to further maintain weight.  Has tried multiple other things for weight loss including diet and activity modification, phentermine, topiramate without significant improvements.  With his developmental delay issue this proves quite difficult.  Recommend trial of semaglutide as below with [facility] pharmacy to help with titration.  Okay for prior authorization as this is quite important for patient to have a weight loss for medical reasons.  - Semaglutide,0.25 or 0.5MG/DOS, 2 MG/1.5ML SOPN; Week 1 through week 4 : 0.25 mg once weekly. Week 5 through week 8: 0.5 mg once weekly. Week 9 through week 12: 1 mg once weekly. Week 13 through week 16: 1.7 mg once weekly. Week 17 and thereafter: 2.4 mg once weekly  - CBC with platelets  - Hemoglobin A1c    LOREN (obstructive sleep apnea)  Continue CPAP.    Overactive bladder  Following with urology.    Tobacco use disorder  Continue to work on cutting down on vaping.    Other abnormal glucose   - Hemoglobin A1c       BMI:   Estimated body mass index is 53.56 kg/m  as calculated from the following:    Height as  "of this encounter: 1.803 m (5' 11\").    Weight as of this encounter: 174.2 kg (384 lb).   Weight management plan: Discussed healthy diet and exercise guidelines Recommend semaglutide injection program as noted above    Return in about 6 months (around 2/27/2022) for preventative care visit, plus, medication recheck.    Kory Hudson MD  [facility] SOPHIE Lamar is a 40 year old who presents for the following health issues     History of Present Illness       He eats 2-3 servings of fruits and vegetables daily.He consumes 2 sweetened beverage(s) daily.He exercises with enough effort to increase his heart rate 9 or less minutes per day.  He exercises with enough effort to increase his heart rate 3 or less days per week.   He is taking medications regularly.    Hypertension Follow-up      Do you check your blood pressure regularly outside of the clinic? No     Are you following a low salt diet? No    Are your blood pressures ever more than 140 on the top number (systolic) OR more   than 90 on the bottom number (diastolic), for example 140/90? No    Asthma Follow-Up    Was ACT completed today?    Yes    ACT Total Scores 8/27/2021   ACT TOTAL SCORE (Goal Greater than or Equal to 20) 25   In the past 12 months, how many times did you visit the emergency room for your asthma without being admitted to the hospital? 0   In the past 12 months, how many times were you hospitalized overnight because of your asthma? 0          How many days per week do you miss taking your asthma controller medication?  I do not have an asthma controller medication    Please describe any recent triggers for your asthma: None    Have you had any Emergency Room Visits, Urgent Care Visits, or Hospital Admissions since your last office visit?  No    Still living in crisis living center [facility] in [place], [place].    History of intermittent asthma, currently controlled.     History of hypertension. Controlled with " "lisinopril and hydrochlorothiazide.     History of obstructive sleep apnea. Wearing CPAP nightly.     History of overactive bladder with nocturnal enuresis.  Following with urology.  Prior InterStim placement.    History of chronic low back pain.    Patient status post revision left total hip arthroplasty with a head and liner exchange to a biomet duel mobility head and liner on 6/24/2019. Followed by [facility].     History of moderate major depression.  Following with psychiatry currently on Abilify, prazosin.     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    History of tobacco use with ongoing smoking.    Review of Systems         Objective    /78   Pulse 86   Temp 98.7  F (37.1  C)   Resp 16   Ht 1.803 m (5' 11\")   Wt (!) 174.2 kg (384 lb)   SpO2 96%   BMI 53.56 kg/m    Body mass index is 53.56 kg/m .  Physical Exam   GENERAL: alert, no distress and morbidly obese  RESP: lungs clear to auscultation - no rales, rhonchi or wheezes  CV: regular rate and rhythm, normal S1 S2, no S3 or S4, no murmur, click or rub, 1+ lower extremity edema to mid shin worse on left with mild erythema, no warmth, nontender           "

## 2021-08-27 NOTE — LETTER
[facility]          [address] [place] [address]                                                                                                       [phone]      Re: Kimo Greenwood  : 1980    To Whom It May Concern,    Above-named patient has significant morbid obesity with a BMI of 53.5 with comorbidities including but not limited to obstructive sleep apnea, hypertension, GERD, decreased mobility secondary to hip osteoarthritis impacted by his obesity, peripheral edema from chronic venous insufficiency. Patient has difficulty increasing activity to the point that he can lose weight without pharmaceutical or surgical assistance due to his hip arthritis and difficulties stemming from developmental delay.    Due to developmental delay patient becomes a poor candidate for gastric bypass. We strongly recommend that patient have access to semaglutide injection for weight loss due to his significant morbid obesity with cooperativeness listed and situation compounded by developmental delay.    We ask you to please reconsider coverage for this medication as prescribed with titration to dose effective for weight loss.    If you have any questions or concerns, please contact our clinic [phone] or my direct line at [phone]      Sincerely,        Kory Hudson MD

## 2021-08-27 NOTE — TELEPHONE ENCOUNTER
PRIOR AUTHORIZATION DENIED - COMPLETED VIA EPA     Medication: Semaglutide,0.25 or 0.5MG/DOS, 2 MG/1.5ML SOPN - DENIED     Denial Date:  08/27/2021    Denial Rational: This medication is covered for patients with Type 2 Diabetes Mellitus           Appeal Information:

## 2021-08-27 NOTE — PROGRESS NOTES
YO called to Gerardo at pt's living facility -  Updated on med changes and gave Gerardo contact information for PAL should he needed it      Printed coupon information for the Semaglutde - Pt's mom will call back on coverage and PAL will update Gerardo and help schedule MTM    Eliana Concepcion RN

## 2021-08-28 LAB
ALBUMIN SERPL-MCNC: 4.1 G/DL (ref 3.4–5)
ALP SERPL-CCNC: 71 U/L (ref 40–150)
ALT SERPL W P-5'-P-CCNC: 32 U/L (ref 0–70)
ANION GAP SERPL CALCULATED.3IONS-SCNC: 4 MMOL/L (ref 3–14)
AST SERPL W P-5'-P-CCNC: 17 U/L (ref 0–45)
BILIRUB SERPL-MCNC: 0.4 MG/DL (ref 0.2–1.3)
BUN SERPL-MCNC: 14 MG/DL (ref 7–30)
CALCIUM SERPL-MCNC: 9.6 MG/DL (ref 8.5–10.1)
CHLORIDE BLD-SCNC: 106 MMOL/L (ref 94–109)
CO2 SERPL-SCNC: 29 MMOL/L (ref 20–32)
CREAT SERPL-MCNC: 0.97 MG/DL (ref 0.66–1.25)
GFR SERPL CREATININE-BSD FRML MDRD: >90 ML/MIN/1.73M2
GLUCOSE BLD-MCNC: 82 MG/DL (ref 70–99)
POTASSIUM BLD-SCNC: 4.2 MMOL/L (ref 3.4–5.3)
PROT SERPL-MCNC: 7.6 G/DL (ref 6.8–8.8)
SODIUM SERPL-SCNC: 139 MMOL/L (ref 133–144)

## 2021-08-28 ASSESSMENT — ASTHMA QUESTIONNAIRES: ACT_TOTALSCORE: 25

## 2021-08-31 ENCOUNTER — MYC MEDICAL ADVICE (OUTPATIENT)
Dept: FAMILY MEDICINE | Facility: CLINIC | Age: 41
End: 2021-08-31

## 2021-08-31 NOTE — PROGRESS NOTES
Medication Therapy Management (MTM) Encounter    ASSESSMENT:                            Medication Adherence/Access: No issues identified    Obesity: Patient qualifies for weight loss medication as BMI>30. Recommend GLP-1 agonist and Ozempic is not approved as he does not have diabetes. Will pursue Saxenda coverage at this time d/t Wegovy on backorder. If Saxenda is not approved for patient, will trial naltrexone. Discussed lifestyle modifications in addition to drug therapy.     Depression:  Following psychiatry. Stable although olanzapine is likely causing significant weight gain. Future could consider switching olanzapine to alternative 2nd generation antipsychotic such as quetiapine or lurasidone which have lower risks of weight gain.     Hypertension: stable    Patel's: stable    Supplements: stable    Incontinence: Following urology.     Pain: Recommend addition of lidocaine patches to be able to minimize chronic NSAID use which may worsen high blood pressure.     Asthma: stable, ACT>20    PLAN:                            1. Decrease mountain dews to 3 max a day. Increase exercise and work up to 150 min/week at least. Discussed decreasing portion sizes and increasing veggies/fruit intake vs carbohydrates.  2. If covered, will start Saxenda 0.6 mg daily for 7 days then may increase to 1.2 mg daily for 7 days then 1.8 mg daily if tolerated. If not covered, will start naltrexone 25 mg daily for 1 week then may increase to 50 mg daily.     Follow-up: Return in about 1 month (around 10/1/2021) for Medication Therapy Management Pharmacist.      SUBJECTIVE/OBJECTIVE:                          Kimo Greenwood is a 40 year old male contacted via secure video for an initial visit. He was referred to me from Dr. Hudson. Patient was accompanied by caregiver at [facility].     Reason for visit: Weight loss.    Allergies/ADRs: Reviewed in chart  Past Medical History: Reviewed in chart  Tobacco: He reports that he has been  smoking vaping device. He has a 1.00 pack-year smoking history. He has never used smokeless tobacco.Tobacco Cessation Action Plan:   Information offered: Patient not interested at this time  vape every day  Alcohol: not currently using  Caffeine: 5+ mountain dew zeros daily    Medication Adherence/Access: no issues reported  Patient uses pill box(es).  Per patient, misses medication less than 1 times per week.     Obesity: Current medication(s) include: Topiramate 50 mg AM and 75 mg PM. Unable to get Ozempic through insurance. Patient is experiencing the follow side effects: None. Topiramate has not been effective for any weight loss. Been on for months.  Diet/Eating Habits: Patient reports eating 3 meals a day. Eats tacos, pb and jelly. Snacks sometimes on sugar free pudding, pretzels and peanut butter.  Drinks mountain dew zero (5+ cans/day) and orange juice.   Exercise: Patient reports minimal exercise.   Failed medications include: phentermine (ineffective), bupropion (for depression).   Weight trend:   Wt Readings from Last 10 Encounters:   08/27/21 (!) 384 lb (174.2 kg)   01/22/21 (!) 379 lb (171.9 kg)   10/28/20 (!) 352 lb (159.7 kg)   10/26/20 (!) 365 lb 12.8 oz (165.9 kg)   09/23/20 (!) 353 lb (160.1 kg)   09/18/20 (!) 353 lb 6.4 oz (160.3 kg)   03/10/20 (!) 363 lb 12.8 oz (165 kg)   03/03/20 (!) 368 lb (166.9 kg)   01/08/20 (!) 352 lb (159.7 kg)   01/03/20 (!) 362 lb 8 oz (164.4 kg)     Patient's comorbidities associated with obesity include: Hypertension, Obstructive Sleep Apnea, Arthritis, GERD and Depression.  Lab Results   Component Value Date    A1C 5.1 08/27/2021    A1C 5.1 12/24/2019    A1C 4.6 03/20/2017    A1C 4.9 01/25/2016    A1C 5.1 08/01/2015    A1C 5.2 05/20/2014       Depression:  Current medications include: olanzapine 10 mg bedtime, prazosin 3mg bedtime, and duloxetine 120 mg daily. Pt reports that depression symptoms are unchanged and stable. Follows psychiatrist at [facility]. No  issues reported now except weight gain with olanzapine.  Past med trials: aripiprazole, fluoxetine, bupropion, sertraline, risperidone, amitriptyline, venlafaxine (all ineffective)  PHQ-9 SCORE 6/18/2019 6/1/2020 8/27/2021   PHQ-9 Total Score - - -   PHQ-9 Total Score MyChart - 8 (Mild depression) 0   PHQ-9 Total Score 2 8 0       Hypertension: Current medications include lisinopril 10 mg daily and hydrochlorothiazide 25 mg daily.  Patient does not self-monitor blood pressure.  Patient reports no current medication side effects.  BP Readings from Last 3 Encounters:   08/27/21 122/78   06/02/21 (!) 143/83   02/17/21 130/80       Patel's: Current medications include: Protonix (pantoprazole) 40 mg once daily. Pt reports no current symptoms.  Patient feels that current regimen is effective.    Supplements: Patient taking vitamin D 2000 unis daily and fiber 2 tablets twice daily. No issues reported.     Incontinence: Patient taking oxybutynin ER 30 mg daily. No issues or side effects now. Following urology.     Pain: Patient taking naproxen 440 mg twice daily. Acetaminophen not effective. Pain in knees and hips.     Asthma: Current medications: albuterol MDI (not needing).  Triggers include: Patient is unaware of triggers.  Patient reports the following symptoms: none.  ACT Total Scores 9/18/2020 9/18/2020 8/27/2021   ACT TOTAL SCORE (Goal Greater than or Equal to 20) 24 24 25   In the past 12 months, how many times did you visit the emergency room for your asthma without being admitted to the hospital? 0 0 0   In the past 12 months, how many times were you hospitalized overnight because of your asthma? 0 0 0         Today's Vitals: There were no vitals taken for this visit.  ----------------      I spent 40 minutes with this patient today. All changes were made via collaborative practice agreement with Kory Hudson MD. A copy of the visit note was provided to the patient's primary care provider.    The patient  was sent via [facility] a summary of these recommendations.     Dorina Benz, PharmD  Medication Therapy Management Provider, [facility]  Pager: [phone]    Telemedicine Visit Details  Type of service:  Video Conference via [employer]  Start Time: 9:31 AM  End Time: 10:12 AM  Originating Location (patient location): Home  Distant Location (provider location):  [facility]     Medication Therapy Recommendations  Morbid obesity (H)    Current Medication: liraglutide - Weight Management (SAXENDA) 18 MG/3ML pen   Rationale: Synergistic therapy - Needs additional medication therapy - Indication   Recommendation: Start Medication   Status: Accepted per CPA

## 2021-08-31 NOTE — TELEPHONE ENCOUNTER
Please see patient Mychart message and advise. PA was denied, awaiting response on whether J.Q. letter of appeal or not.     Shelton LITTLEJOHN RN

## 2021-09-01 ENCOUNTER — TELEPHONE (OUTPATIENT)
Dept: FAMILY MEDICINE | Facility: CLINIC | Age: 41
End: 2021-09-01

## 2021-09-01 ENCOUNTER — TELEPHONE (OUTPATIENT)
Dept: PHARMACY | Facility: CLINIC | Age: 41
End: 2021-09-01

## 2021-09-01 ENCOUNTER — VIRTUAL VISIT (OUTPATIENT)
Dept: PHARMACY | Facility: CLINIC | Age: 41
End: 2021-09-01
Attending: FAMILY MEDICINE
Payer: COMMERCIAL

## 2021-09-01 ENCOUNTER — MYC MEDICAL ADVICE (OUTPATIENT)
Dept: FAMILY MEDICINE | Facility: CLINIC | Age: 41
End: 2021-09-01

## 2021-09-01 DIAGNOSIS — N32.81 OVERACTIVE BLADDER: ICD-10-CM

## 2021-09-01 DIAGNOSIS — G89.29 CHRONIC LOW BACK PAIN WITHOUT SCIATICA, UNSPECIFIED BACK PAIN LATERALITY: ICD-10-CM

## 2021-09-01 DIAGNOSIS — I10 ESSENTIAL HYPERTENSION: ICD-10-CM

## 2021-09-01 DIAGNOSIS — E66.01 MORBID OBESITY (H): Primary | ICD-10-CM

## 2021-09-01 DIAGNOSIS — E66.01 MORBID OBESITY (H): ICD-10-CM

## 2021-09-01 DIAGNOSIS — J45.20 MILD INTERMITTENT ASTHMA WITHOUT COMPLICATION: ICD-10-CM

## 2021-09-01 DIAGNOSIS — F32.1 MODERATE MAJOR DEPRESSION (H): ICD-10-CM

## 2021-09-01 DIAGNOSIS — K22.70 BARRETT'S ESOPHAGUS DETERMINED BY BIOPSY: ICD-10-CM

## 2021-09-01 DIAGNOSIS — Z78.9 TAKES DIETARY SUPPLEMENTS: ICD-10-CM

## 2021-09-01 DIAGNOSIS — M54.50 CHRONIC LOW BACK PAIN WITHOUT SCIATICA, UNSPECIFIED BACK PAIN LATERALITY: ICD-10-CM

## 2021-09-01 PROCEDURE — 99607 MTMS BY PHARM ADDL 15 MIN: CPT | Performed by: PHARMACIST

## 2021-09-01 PROCEDURE — 99605 MTMS BY PHARM NP 15 MIN: CPT | Performed by: PHARMACIST

## 2021-09-01 RX ORDER — LIDOCAINE 4 G/G
1-2 PATCH TOPICAL EVERY 24 HOURS
Qty: 60 PATCH | Refills: 1 | Status: SHIPPED | OUTPATIENT
Start: 2021-09-01 | End: 2022-01-25

## 2021-09-01 RX ORDER — LIRAGLUTIDE 6 MG/ML
INJECTION, SOLUTION SUBCUTANEOUS
Qty: 9 ML | Refills: 1 | Status: SHIPPED | OUTPATIENT
Start: 2021-09-01 | End: 2021-09-23

## 2021-09-01 RX ORDER — PEN NEEDLE, DIABETIC 32GX 5/32"
NEEDLE, DISPOSABLE MISCELLANEOUS
Qty: 100 EACH | Refills: 3 | Status: SHIPPED | OUTPATIENT
Start: 2021-09-01 | End: 2022-01-25

## 2021-09-01 NOTE — TELEPHONE ENCOUNTER
Prior Authorization Approval    Authorization Effective Date: 8/2/2021  Authorization Expiration Date: 9/1/2022  Medication: saxenda 18 MG/3ML pen- PA APPROVED  Approved Dose/Quantity: 15 ML/30 DAYS  Reference #: [id number]   Insurance Company: MEDICA - Phone [phone] Fax [phone]  Expected CoPay:     $0  CoPay Card Available:      Foundation Assistance Needed:    Which Pharmacy is filling the prescription (Not needed for infusion/clinic administered): [facility] [place] - [place], [place] - [address]  Pharmacy Notified: Yes  Patient Notified: Yes          Central Prior Authorization Team  Phone: [phone]

## 2021-09-01 NOTE — Clinical Note
FYI - working on getting Saxenda covered since unable to get Ozempic d/t no diabetes. Next year can transition hopefully to Wegovy which is currently on backorder.

## 2021-09-01 NOTE — TELEPHONE ENCOUNTER
PA Initiation    Medication: saxenda 18 MG/3ML pen- INITIATED  Insurance Company: MEDICA - Phone [phone] Fax [phone]  [facility] [place] [facility] [place] [place] [facility] [address]  Filling Pharmacy Phone: [phone]  Filling Pharmacy Fax: [phone]  Start Date: 9/1/2021

## 2021-09-01 NOTE — CONFIDENTIAL NOTE
Reordered. Need daily needles for Saxenda.    Dorina Benz, PharmD  Medication Therapy Management Provider, [facility]  Pager: [phone]

## 2021-09-01 NOTE — CONFIDENTIAL NOTE
Prior Authorization Retail Medication Request    Medication/Dose: saxenda  ICD code (if different than what is on RX):  na  Previously Tried and Failed:  Phentermine, topiramate, bupropion  Rationale:  Patient is morbidly obese and requires a weight loss medication to assist with weight loss to better manage comorbid conditions which are worsening due to obesity. He has tried other weight loss medications as seen above which have all been ineffective.    Insurance Name:  [employer]  [facility]  Insurance ID:  [id number]       Pharmacy Information (if different than what is on RX)  Name:    Phone:

## 2021-09-01 NOTE — TELEPHONE ENCOUNTER
Pharmacy called looking for prescription of pen needles to go along with recent Brittany EDWARDS approval.     Routing refill request to provider for review/approval because:  Drug not active on patient's medication list    CASTILLO CrenshawN, RN  [facility]

## 2021-09-01 NOTE — PATIENT INSTRUCTIONS
Recommendations from today's MTM visit:                                                    MTM (medication therapy management) is a service provided by a clinical pharmacist designed to help you get the most of out of your medicines.   Today we reviewed what your medicines are for, how to know if they are working, that your medicines are safe and how to make your medicine regimen as easy as possible.      1. Decrease mountain dews to 3 max a day. Increase exercise and work up to 150 min/week at least.   2. If covered, will start Saxenda 0.6 mg daily for 7 days then may increase to 1.2 mg daily for 7 days then 1.8 mg daily if tolerated. If not covered, will start naltrexone 25 mg daily for 1 week then may increase to 50 mg daily.     It was great to speak with you today.  I value your experience and would be very thankful for your time with providing feedback on our clinic survey. You may receive a survey via email or text message in the next few days.     To schedule another MTM appointment, please call the clinic directly or you may call the MTM scheduling line at [phone] or toll-free at [phone].     My Clinical Pharmacist's contact information:                                                      Please feel free to contact me with any questions or concerns you have.      Dorina Benz, PharmD  Medication Therapy Management Provider, [facility]

## 2021-09-03 NOTE — TELEPHONE ENCOUNTER
Please see myChart message and advise.      Did not see in chart if pt is supposed to stop the Topiramate.     Amy Barnes RN

## 2021-09-04 ENCOUNTER — HEALTH MAINTENANCE LETTER (OUTPATIENT)
Age: 41
End: 2021-09-04

## 2021-09-23 RX ORDER — LIRAGLUTIDE 6 MG/ML
1.8 INJECTION, SOLUTION SUBCUTANEOUS DAILY
Qty: 9 ML | Refills: 1 | Status: SHIPPED | OUTPATIENT
Start: 2021-09-23 | End: 2021-10-06

## 2021-09-28 ENCOUNTER — TRANSFERRED RECORDS (OUTPATIENT)
Dept: HEALTH INFORMATION MANAGEMENT | Facility: CLINIC | Age: 41
End: 2021-09-28

## 2021-10-05 NOTE — PROGRESS NOTES
Medication Therapy Management (MTM) Encounter    ASSESSMENT:                            Medication Adherence/Access: No issues identified    Obesity: Improving, will increase Saxenda dose to assist with further weight loss. Also recommend discontinuing topiramate d/t ineffectiveness and minimal weight loss seen since starting topiramate.     Depression:  Stable, Following psychiatry.   Hypertension: stable    PLAN:                            1. Increase Saxenda to 2.4 mg daily and in 1-2 weeks, increase to 3 mg daily if tolerated with the nausea.  2. MTM to contact psychiatry regarding stopping topiramate.     10/8/21 Addendum: Verbal agreement from Traci Pulido to discontinue topiramate.    Follow-up: Return in about 1 month (around 11/6/2021) for Medication Therapy Management Pharmacist.      SUBJECTIVE/OBJECTIVE:                          Kimo Greenwood is a 40 year old male contacted via secure video for a follow-up visit. He was referred to me from Dr. Hudson. Today's visit is a co-visit with caregiver from [facility]. Today's visit is a follow-up MTM visit from 9/1.     Reason for visit: Saxenda follow-up.    Tobacco: He reports that he has been smoking vaping device. He has a 1.00 pack-year smoking history. He has never used smokeless tobacco.Tobacco Cessation Action Plan:   Information offered: Patient not interested at this time  Alcohol: not currently using  Caffeine: 5+ mountain dew zeros daily    Medication Adherence/Access: no issues reported  Patient uses pill box(es).  Per patient, misses medication less than 1 times per week.     Obesity: Current medication(s) include: Saxenda 1.2 mg daily and Topiramate 50 mg AM and 75 mg PM. Patient is experiencing the follow side effects: None. Nausea at first on Saxenda but no longer. Topiramate has not been effective for any weight loss.  Diet/Eating Habits: Patient reports eating 3 meals a day. Eats tacos, pb and jelly, varies. Snacks sometimes on cheese and  crackers, pretzels and peanut butter.  No more mountain dew and only drinking gatorade zero.   Exercise: Patient reports minimal exercise.   Failed medications include: phentermine (ineffective), bupropion (for depression), topiramate (ineffective).   Home weight today 370 lbs, 366 lbs at [facility] recently  Weight trend:   Wt Readings from Last 10 Encounters:   08/27/21 (!) 384 lb (174.2 kg)   01/22/21 (!) 379 lb (171.9 kg)   10/28/20 (!) 352 lb (159.7 kg)   10/26/20 (!) 365 lb 12.8 oz (165.9 kg)   09/23/20 (!) 353 lb (160.1 kg)   09/18/20 (!) 353 lb 6.4 oz (160.3 kg)   03/10/20 (!) 363 lb 12.8 oz (165 kg)   03/03/20 (!) 368 lb (166.9 kg)   01/08/20 (!) 352 lb (159.7 kg)   01/03/20 (!) 362 lb 8 oz (164.4 kg)     Patient's comorbidities associated with obesity include: Hypertension, Obstructive Sleep Apnea, Arthritis, GERD and Depression.  Lab Results   Component Value Date    A1C 5.1 08/27/2021    A1C 5.1 12/24/2019    A1C 4.6 03/20/2017    A1C 4.9 01/25/2016    A1C 5.1 08/01/2015    A1C 5.2 05/20/2014       Depression:  Current medications include: olanzapine 7.5 mg bedtime (decreased dose), prazosin 3mg bedtime, and duloxetine 120 mg daily. Pt reports that depression symptoms are unchanged and stable, feels fine on lower olanzapine dose. No sleep changes. Follows psychiatrist Traci Pulido at [facility].   Past med trials: aripiprazole, fluoxetine, bupropion, sertraline, risperidone, amitriptyline, venlafaxine (all ineffective)  PHQ 6/18/2019 6/1/2020 8/27/2021   PHQ-9 Total Score 2 8 0   Q9: Thoughts of better off dead/self-harm past 2 weeks Not at all Several days Not at all   F/U: Thoughts of suicide or self-harm - No -   F/U: Safety concerns - No -       Hypertension: Current medications include lisinopril 10 mg daily and hydrochlorothiazide 25 mg daily.  Patient does not self-monitor blood pressure.  Patient reports no current medication side effects.  BP Readings from Last 3 Encounters:    08/27/21 122/78   06/02/21 (!) 143/83   02/17/21 130/80       Today's Vitals: There were no vitals taken for this visit.  ----------------      I spent 22 minutes with this patient today. All changes were made via collaborative practice agreement with Kory Hudson MD. A copy of the visit note was provided to the patient's primary care provider.    The patient was sent via [employer] a summary of these recommendations.     Dorina Benz, PharmD  Medication Therapy Management Provider, [facility]  Pager: [phone]    Telemedicine Visit Details  Type of service:  Video Conference via [employer]  Start Time: 9:38 AM  End Time: 10:00 AM  Originating Location (patient location): Home  Distant Location (provider location):  [facility]     Medication Therapy Recommendations  Morbid obesity (H)    Current Medication: liraglutide - Weight Management (SAXENDA) 18 MG/3ML pen   Rationale: Dose too low - Dosage too low - Effectiveness   Recommendation: Increase Dose   Status: Accepted per CPA          Current Medication: topiramate (TOPAMAX) 25 MG tablet (Discontinued)   Rationale: Nonmedication therapy more appropriate - Unnecessary medication therapy - Indication   Recommendation: Discontinue Medication   Status: Accepted per Provider

## 2021-10-06 ENCOUNTER — VIRTUAL VISIT (OUTPATIENT)
Dept: PHARMACY | Facility: CLINIC | Age: 41
End: 2021-10-06

## 2021-10-06 DIAGNOSIS — E66.01 MORBID OBESITY (H): Primary | ICD-10-CM

## 2021-10-06 DIAGNOSIS — I10 ESSENTIAL HYPERTENSION: ICD-10-CM

## 2021-10-06 DIAGNOSIS — F32.1 MODERATE MAJOR DEPRESSION (H): ICD-10-CM

## 2021-10-06 PROBLEM — F10.10 ALCOHOL USE DISORDER, MILD, ABUSE: Status: ACTIVE | Noted: 2021-10-06

## 2021-10-06 PROBLEM — F12.10 CANNABIS USE DISORDER, MILD, ABUSE: Status: ACTIVE | Noted: 2021-10-06

## 2021-10-06 PROCEDURE — 99606 MTMS BY PHARM EST 15 MIN: CPT | Performed by: PHARMACIST

## 2021-10-06 PROCEDURE — 99607 MTMS BY PHARM ADDL 15 MIN: CPT | Performed by: PHARMACIST

## 2021-10-06 RX ORDER — LIRAGLUTIDE 6 MG/ML
3 INJECTION, SOLUTION SUBCUTANEOUS DAILY
Qty: 15 ML | Refills: 5 | Status: SHIPPED | OUTPATIENT
Start: 2021-10-06 | End: 2021-11-01 | Stop reason: DRUGHIGH

## 2021-10-06 RX ORDER — OLANZAPINE 7.5 MG/1
TABLET, FILM COATED ORAL
COMMUNITY
Start: 2021-09-28 | End: 2021-12-13 | Stop reason: DRUGHIGH

## 2021-10-06 NOTE — PATIENT INSTRUCTIONS
Recommendations from today's MTM visit:                                                       1. Increase Saxenda to 2.4 mg daily and in 1-2 weeks, increase to 3 mg daily if tolerated with the nausea.  2. Will let you know if we can stop the topiramate after contacting Traci Pulido at [facility].    It was great to speak with you today.  I value your experience and would be very thankful for your time with providing feedback on our clinic survey. You may receive a survey via email or text message in the next few days.     To schedule another MTM appointment, please call the clinic directly or you may call the MTM scheduling line at [phone] or toll-free at [phone].     My Clinical Pharmacist's contact information:                                                      Please feel free to contact me with any questions or concerns you have.      Dorina Benz, PharmD  Medication Therapy Management Provider, [facility]

## 2021-10-18 ENCOUNTER — TRANSFERRED RECORDS (OUTPATIENT)
Dept: HEALTH INFORMATION MANAGEMENT | Facility: CLINIC | Age: 41
End: 2021-10-18
Payer: MEDICARE

## 2021-10-21 ENCOUNTER — TELEPHONE (OUTPATIENT)
Dept: GENERAL RADIOLOGY | Facility: CLINIC | Age: 41
End: 2021-10-21

## 2021-10-21 DIAGNOSIS — Z11.59 ENCOUNTER FOR SCREENING FOR OTHER VIRAL DISEASES: ICD-10-CM

## 2021-10-22 ENCOUNTER — TELEPHONE (OUTPATIENT)
Dept: FAMILY MEDICINE | Facility: CLINIC | Age: 41
End: 2021-10-22
Payer: MEDICARE

## 2021-10-22 NOTE — TELEPHONE ENCOUNTER
AGNIESZKA PA REQUEST    Prior Authorization Retail Medication Request    Medication/Dose: Saxenda 3mg daily  ICD code (if different than what is on RX):    Previously Tried and Failed:  Phentermine, topiramate, bupropion  Rationale:   Patient is morbidly obese and requires a weight loss medication to assist with weight loss to better manage comorbid conditions which are worsening due to obesity. He has tried other weight loss medications as seen above which have all been ineffective. Saxenda has been working very well and would like to increase dose to max dose.        Insurance Name:  [employer]  [facility]  Insurance ID:  [id number]     Pharmacy Information (if different than what is on RX)  Name:    Phone:

## 2021-10-26 ENCOUNTER — MYC MEDICAL ADVICE (OUTPATIENT)
Dept: FAMILY MEDICINE | Facility: CLINIC | Age: 41
End: 2021-10-26

## 2021-10-26 DIAGNOSIS — E66.01 MORBID OBESITY (H): Primary | ICD-10-CM

## 2021-10-26 NOTE — TELEPHONE ENCOUNTER
Dorina can you address this my chart message for family.     PA was started on 10/22    Eliana Concepcion RN

## 2021-10-27 ENCOUNTER — HOSPITAL ENCOUNTER (OUTPATIENT)
Dept: GENERAL RADIOLOGY | Facility: CLINIC | Age: 41
Discharge: HOME | End: 2021-10-27
Attending: ORTHOPAEDIC SURGERY | Admitting: ORTHOPAEDIC SURGERY
Payer: MEDICARE

## 2021-10-27 VITALS — HEART RATE: 69 BPM | SYSTOLIC BLOOD PRESSURE: 128 MMHG | DIASTOLIC BLOOD PRESSURE: 65 MMHG

## 2021-10-27 DIAGNOSIS — M25.559 JOINT PAIN, HIP: ICD-10-CM

## 2021-10-27 DIAGNOSIS — M16.9 HIP OSTEOARTHRITIS: ICD-10-CM

## 2021-10-27 PROCEDURE — 250N000009 HC RX 250

## 2021-10-27 PROCEDURE — 255N000002 HC RX 255 OP 636: Performed by: PHYSICIAN ASSISTANT

## 2021-10-27 PROCEDURE — 20610 DRAIN/INJ JOINT/BURSA W/O US: CPT | Mod: RT

## 2021-10-27 PROCEDURE — 250N000011 HC RX IP 250 OP 636

## 2021-10-27 RX ORDER — TRIAMCINOLONE ACETONIDE 40 MG/ML
40 INJECTION, SUSPENSION INTRA-ARTICULAR; INTRAMUSCULAR ONCE
Status: COMPLETED | OUTPATIENT
Start: 2021-10-27 | End: 2021-10-27

## 2021-10-27 RX ORDER — BUPIVACAINE HYDROCHLORIDE 5 MG/ML
INJECTION, SOLUTION EPIDURAL; INTRACAUDAL
Status: COMPLETED
Start: 2021-10-27 | End: 2021-10-27

## 2021-10-27 RX ORDER — BUPIVACAINE HYDROCHLORIDE 5 MG/ML
4 INJECTION, SOLUTION EPIDURAL; INTRACAUDAL ONCE
Status: COMPLETED | OUTPATIENT
Start: 2021-10-27 | End: 2021-10-27

## 2021-10-27 RX ORDER — LIDOCAINE HYDROCHLORIDE 10 MG/ML
5 INJECTION, SOLUTION EPIDURAL; INFILTRATION; INTRACAUDAL; PERINEURAL ONCE
Status: COMPLETED | OUTPATIENT
Start: 2021-10-27 | End: 2021-10-27

## 2021-10-27 RX ORDER — TRIAMCINOLONE ACETONIDE 40 MG/ML
INJECTION, SUSPENSION INTRA-ARTICULAR; INTRAMUSCULAR
Status: COMPLETED
Start: 2021-10-27 | End: 2021-10-27

## 2021-10-27 RX ORDER — LIDOCAINE HYDROCHLORIDE 10 MG/ML
INJECTION, SOLUTION EPIDURAL; INFILTRATION; INTRACAUDAL; PERINEURAL
Status: COMPLETED
Start: 2021-10-27 | End: 2021-10-27

## 2021-10-27 RX ORDER — IOPAMIDOL 408 MG/ML
10 INJECTION, SOLUTION INTRATHECAL ONCE
Status: COMPLETED | OUTPATIENT
Start: 2021-10-27 | End: 2021-10-27

## 2021-10-27 RX ADMIN — IOPAMIDOL 2 ML: 408 INJECTION, SOLUTION INTRATHECAL at 14:32

## 2021-10-27 RX ADMIN — BUPIVACAINE HYDROCHLORIDE 20 MG: 5 INJECTION, SOLUTION EPIDURAL; INTRACAUDAL at 14:33

## 2021-10-27 RX ADMIN — LIDOCAINE HYDROCHLORIDE 5 ML: 10 INJECTION, SOLUTION EPIDURAL; INFILTRATION; INTRACAUDAL; PERINEURAL at 14:32

## 2021-10-27 RX ADMIN — TRIAMCINOLONE ACETONIDE 40 MG: 40 INJECTION, SUSPENSION INTRA-ARTICULAR; INTRAMUSCULAR at 14:33

## 2021-10-27 NOTE — PROGRESS NOTES
RADIOLOGY PROCEDURE NOTE  Patient name: Kimo Greenwood  MRN: [id number]  : 1980    Pre-procedure diagnosis: Right hip pain  Post-procedure diagnosis: Same    Procedure Date/Time: 2021  3:51 PM  Procedure: Right hip joint steroid injection  Estimated blood loss: None  Specimen(s) collected with description: none  The patient tolerated the procedure well with no immediate complications.  Significant findings:none    See imaging dictation for procedural details.    Provider name: Alberto Rodriguez PA-C  Assistant(s):None

## 2021-10-27 NOTE — PROGRESS NOTES
Pt was in Radiology today for a right hip steroid injection. Pt tolerated ortho procedure well. Pre procedure pain was 8 post procedure pain level 3.Procedure was completed by TERRANCE EDWARDS. There were no complications during this procedure. Pt verbalized understanding of written and verbal instructions and left department in stable and satisfactory condition with cargiver. There is no evidence of bleeding or any other complications upon discharge.

## 2021-11-01 RX ORDER — LIRAGLUTIDE 6 MG/ML
1.8 INJECTION SUBCUTANEOUS DAILY
Qty: 9 ML | Refills: 5 | Status: SHIPPED | OUTPATIENT
Start: 2021-11-01 | End: 2021-11-09 | Stop reason: DRUGHIGH

## 2021-11-01 NOTE — TELEPHONE ENCOUNTER
PRIOR AUTHORIZATION DENIED    Medication: Saxenda-Denied     Denial Date: 10/25/2021    Denial Rational: The Medicare rule in the Prescription Drug Manual (Chapter 6, Section 20.1) says drugs used to help you lose weight are excluded from Medicare Part D coverage. [employer] follows Medicare rules. The information we have about your case says your drug is being used for weight loss and per Medicare rules isnt covered.        Appeal Information: If the provider would like to appeal this denial, please provide a letter of medical necessity. Please also include any therapies that the patient has tried and their outcomes. The patient's insurance company will also require the provider to address why the insurance preferred options are not appropriate in the patient's therapy.  The reason could be that the preferred options will harm the patient; either physically or mentally. They are contraindicated to the patient; or the patient has already been taking the requested medication and changing the therapy would change the outcome of their therapy.    Once it has been completed and placed in the patient's chart, notify the [facility] PA Team ([facility]) and the appeal can be initiated on behalf of the patient and provider.

## 2021-11-01 NOTE — TELEPHONE ENCOUNTER
His insurance was covering Saxenda/Victoza at 1.8 mg daily (diabetes dosage) until higher doses prescribed. Will retry lower 1.8 mg daily dose.    Dorina Benz, PharmD  Medication Therapy Management Provider, [facility]  Pager: [phone]

## 2021-11-04 ENCOUNTER — TELEPHONE (OUTPATIENT)
Dept: FAMILY MEDICINE | Facility: CLINIC | Age: 41
End: 2021-11-04

## 2021-11-05 NOTE — TELEPHONE ENCOUNTER
Spoke with mom- they will discuss this at virtual visit with MTM on 11/9     Sound like insurance issue as pt's insurance was cancelled for [employer] on 9/30 and this correlates with denial of the 3 mg dosing.   They have since corrected the problem.     Picked up the Victoza called in on 11/1 so will discuss increased dosing again next week and MTM visit      Eliana Concepcion RN

## 2021-11-08 NOTE — PROGRESS NOTES
Medication Therapy Management (MTM) Encounter    ASSESSMENT:                            Medication Adherence/Access: No issues identified    Obesity: BMI is >30 so qualifies for weight loss medications. Due to need for increased weight loss (for upcoming surgery, patient reported goal of BMI<45), recommend using Victoza 1.2mg x2 daily for at least a week to mimic the Saxenda 2.4mg that he will begin using when it is covered and filled by insurance later this month. Recommend using Saxenda 3mg daily when he gets the medication. In the future, can see if Wegovy is covered, so he only needs a once weekly injection.    Depression: Stable following psychiatry.     Hypertension: Stable and at goal of <130/80mmHg per 2017 AHA/ACC hypertension guidelines.     PLAN:                            1. Start using Victoza 1.2mg x2 (total of 2.4mg) daily for one week (or until you use up your supply of Victoza) and then start using Saxenda 3mg daily.    Follow-up: Return in about 4 weeks (around 12/7/2021) for Follow up, using a video visit, Medication Therapy Management Pharmacist.      SUBJECTIVE/OBJECTIVE:                          Kimo Greenwood is a 40 year old male contacted via secure video for a follow-up visit. He was referred to me from Dr. Hudson. Today his mom, Dalia, joined the visit with him. Today's visit is a follow-up MTM visit from 10/6.     Reason for visit: Saxenda follow-up.    Tobacco: He reports that he has been smoking vaping device. He has a 1.00 pack-year smoking history. He has never used smokeless tobacco.   Tobacco Cessation Action Plan:   Information offered: Patient not interested at this time  Alcohol: not currently using    Medication Adherence/Access: no issues reported  Patient uses pill box(es).  Per patient, misses medication less than 1 times per week.     Obesity: Current medication(s) include: Victoza 1.8mg daily. Patient is experiencing the follow side effects: None, no nausea. He has 3 pens  of Victoza at home and will plan to use these up before he starts using Saxenda. He doesn't feel he has gained weight back while he went 3 weeks without the Victoza. At the beginning of the month he weighed himself at home and it was 362 lb. Saxenda should be covered again now with insurance.  Failed medications include: phentermine (ineffective), bupropion (for depression), topiramate (ineffective).   Weight trend:   Wt Readings from Last 10 Encounters:   08/27/21 (!) 384 lb (174.2 kg)   01/22/21 (!) 379 lb (171.9 kg)   10/28/20 (!) 352 lb (159.7 kg)   10/26/20 (!) 365 lb 12.8 oz (165.9 kg)   09/23/20 (!) 353 lb (160.1 kg)   09/18/20 (!) 353 lb 6.4 oz (160.3 kg)   03/10/20 (!) 363 lb 12.8 oz (165 kg)   03/03/20 (!) 368 lb (166.9 kg)   01/08/20 (!) 352 lb (159.7 kg)   01/03/20 (!) 362 lb 8 oz (164.4 kg)     BMI: 50.5 kg/m2 (using home reported weight)    Patient's comorbidities associated with obesity include: Hypertension, Obstructive Sleep Apnea, Arthritis, GERD and Depression.  Lab Results   Component Value Date    A1C 5.1 08/27/2021    A1C 5.1 12/24/2019    A1C 4.6 03/20/2017    A1C 4.9 01/25/2016    A1C 5.1 08/01/2015    A1C 5.2 05/20/2014       Depression:  Current medications include: olanzapine (unsure of dose, last known dose was 7.5 mg) at bedtime (decreasing dose, plan is to be off of it completely)- they have not noticed any paranoia or changes in mood with the lower dose, prazosin 3mg bedtime, and duloxetine 120 mg daily. No sleep changes. Follows psychiatrist Traci Pulido at [facility].   Past med trials: aripiprazole, fluoxetine, bupropion, sertraline, risperidone, amitriptyline, venlafaxine (all ineffective)  PHQ 6/18/2019 6/1/2020 8/27/2021   PHQ-9 Total Score 2 8 0   Q9: Thoughts of better off dead/self-harm past 2 weeks Not at all Several days Not at all   F/U: Thoughts of suicide or self-harm - No -   F/U: Safety concerns - No -       Hypertension: Current medications include lisinopril 10  mg daily and hydrochlorothiazide 25 mg daily.  Patient does not self-monitor blood pressure.  Patient reports no current medication side effects.  BP Readings from Last 3 Encounters:   10/27/21 128/65   08/27/21 122/78   06/02/21 (!) 143/83       Today's Vitals: There were no vitals taken for this visit.  ----------------    I spent 22 minutes with this patient today. All changes were made via collaborative practice agreement with Kory Hudson MD. A copy of the visit note was provided to the patient's primary care provider.    The patient was sent via [employer] a summary of these recommendations.     Ashleigh Solis, 4th Year Pharmacy Student    Dorina Benz, PharmD  Medication Therapy Management Provider, [facility]  Pager: [phone]    Telemedicine Visit Details  Type of service:  Video Conference via Dashbook  Start Time: 8:58 AM  End Time: 9:20 AM  Originating Location (patient location): Home  Distant Location (provider location):  [facility]     Medication Therapy Recommendations  Morbid obesity (H)    Current Medication: liraglutide - Weight Management (SAXENDA) 18 MG/3ML pen   Rationale: Dose too low - Dosage too low - Effectiveness   Recommendation: Increase Dose   Status: Accepted per CPA   Note: Start using Victoza 1.2mg x2 (total of 2.4mg) daily for one week (or until you use up your supply of Victoza) and then start using Saxenda 3mg daily.

## 2021-11-09 ENCOUNTER — VIRTUAL VISIT (OUTPATIENT)
Dept: PHARMACY | Facility: CLINIC | Age: 41
End: 2021-11-09

## 2021-11-09 DIAGNOSIS — I10 ESSENTIAL HYPERTENSION: ICD-10-CM

## 2021-11-09 DIAGNOSIS — F32.1 MODERATE MAJOR DEPRESSION (H): ICD-10-CM

## 2021-11-09 DIAGNOSIS — E66.01 MORBID OBESITY (H): Primary | ICD-10-CM

## 2021-11-09 PROCEDURE — 99606 MTMS BY PHARM EST 15 MIN: CPT | Performed by: PHARMACIST

## 2021-11-09 PROCEDURE — 99607 MTMS BY PHARM ADDL 15 MIN: CPT | Performed by: PHARMACIST

## 2021-11-09 RX ORDER — LIRAGLUTIDE 6 MG/ML
3 INJECTION, SOLUTION SUBCUTANEOUS DAILY
Qty: 15 ML | Refills: 5 | Status: SHIPPED | OUTPATIENT
Start: 2021-11-09 | End: 2021-12-16

## 2021-11-09 NOTE — PATIENT INSTRUCTIONS
Recommendations from today's MTM visit:                                                       1. Start using Victoza 1.2mg x2 (total of 2.4mg) daily for one week (or until you use up your supply of Victoza) and then start using Saxenda 3mg daily.    It was great to speak with you today.  I value your experience and would be very thankful for your time with providing feedback on our clinic survey. You may receive a survey via email or text message in the next few days.     To schedule another MTM appointment, please call the clinic directly or you may call the MTM scheduling line at [phone] or toll-free at [phone].     My Clinical Pharmacist's contact information:                                                      Please feel free to contact me with any questions or concerns you have.      Dorina Benz, PharmD  Medication Therapy Management Provider, [facility]

## 2021-11-20 DIAGNOSIS — K59.01 SLOW TRANSIT CONSTIPATION: ICD-10-CM

## 2021-11-20 RX ORDER — CALCIUM POLYCARBOPHIL 625 MG/1
TABLET, FILM COATED ORAL
Qty: 360 TABLET | Refills: 3 | Status: SHIPPED | OUTPATIENT
Start: 2021-11-20 | End: 2022-11-25

## 2021-11-23 DIAGNOSIS — S73.005S DISLOCATION OF LEFT HIP, SEQUELA: ICD-10-CM

## 2021-11-23 RX ORDER — COVID-19 ANTIGEN TEST
KIT MISCELLANEOUS
Qty: 60 CAPSULE | Refills: 3 | Status: SHIPPED | OUTPATIENT
Start: 2021-11-23 | End: 2023-03-30

## 2021-11-23 NOTE — TELEPHONE ENCOUNTER
Pharmacy change and call for RF on Naproxen.     Prescription approved per [facility] Refill Protocol.      Eliana Concepcion RN

## 2021-12-13 ENCOUNTER — TELEPHONE (OUTPATIENT)
Dept: FAMILY MEDICINE | Facility: CLINIC | Age: 41
End: 2021-12-13
Payer: MEDICARE

## 2021-12-13 ENCOUNTER — VIRTUAL VISIT (OUTPATIENT)
Dept: PHARMACY | Facility: CLINIC | Age: 41
End: 2021-12-13

## 2021-12-13 DIAGNOSIS — I10 ESSENTIAL HYPERTENSION: ICD-10-CM

## 2021-12-13 DIAGNOSIS — F32.1 MODERATE MAJOR DEPRESSION (H): ICD-10-CM

## 2021-12-13 DIAGNOSIS — E66.01 MORBID OBESITY (H): Primary | ICD-10-CM

## 2021-12-13 PROCEDURE — 99606 MTMS BY PHARM EST 15 MIN: CPT | Performed by: PHARMACIST

## 2021-12-13 RX ORDER — OLANZAPINE 5 MG/1
7.5 TABLET ORAL AT BEDTIME
Status: ON HOLD | COMMUNITY
End: 2022-05-11

## 2021-12-13 RX ORDER — SEMAGLUTIDE 2.4 MG/.75ML
2.4 INJECTION, SOLUTION SUBCUTANEOUS WEEKLY
Qty: 3 ML | Refills: 5 | Status: SHIPPED | OUTPATIENT
Start: 2021-12-13 | End: 2021-12-16

## 2021-12-13 NOTE — TELEPHONE ENCOUNTER
Prior Authorization Retail Medication Request    Medication/Dose: Semaglutide-Weight Management (WEGOVY) 2.4 MG/0.75ML SOAJ  ICD code (if different than what is on RX):    Previously Tried and Failed:  liraglutide - Weight Management (SAXENDA) 18 MG/3ML pen  Rationale:  Previous medication not working.    [employer]S    KEY: [id number]  PATIENT LAST NAME: JOSE ARMANDO  : 1980      Pharmacy Information (if different than what is on RX)  Name:    Phone:        Santiago AGUILAR

## 2021-12-13 NOTE — PATIENT INSTRUCTIONS
Recommendations from today's MTM visit:                                                       1. May switch Saxenda if no longer covered to Wegovy 2.4 mg once weekly. Will let you know on Mychart if Wegovy is covered.       It was great to speak with you today.  I value your experience and would be very thankful for your time with providing feedback on our clinic survey. You may receive a survey via email or text message in the next few days.     To schedule another MTM appointment, please call the clinic directly or you may call the MTM scheduling line at [phone] or toll-free at [phone].     My Clinical Pharmacist's contact information:                                                      Please feel free to contact me with any questions or concerns you have.      Dorina Benz, PharmD  Medication Therapy Management Provider, [facility]

## 2021-12-13 NOTE — PROGRESS NOTES
Medication Therapy Management (MTM) Encounter    ASSESSMENT:                            Medication Adherence/Access: No issues identified    Obesity: BMI is >30 so qualifies for weight loss medications. Due to need for increased weight loss (for upcoming surgery, patient reported goal of BMI<45), recommend continuing GLP1 agonist for continued weight loss. If Saxenda isn't covered, will try to switch to Wegovy if preferred and approved through prior auth.    Depression: Stable following psychiatry.     Hypertension: Stable and at goal of <130/80mmHg per 2017 AHA/ACC hypertension guidelines.     PLAN:                            1. May switch Saxenda if no longer covered to Wegovy 2.4 mg once weekly.     Follow-up: Return in about 6 months (around 6/13/2022) for Medication Therapy Management Pharmacist.      SUBJECTIVE/OBJECTIVE:                          Kimo Greenwood is a 41 year old male called for a follow-up visit. He was referred to me from Dr. Hudson.  Today's visit is a follow-up MTM visit from 11/9.     Reason for visit: Saxenda follow-up.    Tobacco: He reports that he has quit smoking. His smoking use included vaping device. He has a 1.00 pack-year smoking history. He has never used smokeless tobacco.  Alcohol: not currently using    Medication Adherence/Access: Saxenda not being covered again with new insurance changes.  Patient uses pill box(es).     Obesity: Current medication(s) include: Saxenda 3 mg daily. Patient is experiencing the follow side effects: None, no nausea. With new insurance, Saxenda not being covered again? He has one pen left.  Failed medications include: phentermine (ineffective), bupropion (for depression), topiramate (ineffective).   Weight trend:   Wt Readings from Last 3 Encounters:   08/27/21 (!) 384 lb (174.2 kg)   01/22/21 (!) 379 lb (171.9 kg)   10/28/20 (!) 352 lb (159.7 kg)     Patient's comorbidities associated with obesity include: Hypertension, Obstructive Sleep Apnea,  Arthritis, GERD and Depression.  Lab Results   Component Value Date    A1C 5.1 08/27/2021    A1C 5.1 12/24/2019    A1C 4.6 03/20/2017    A1C 4.9 01/25/2016    A1C 5.1 08/01/2015    A1C 5.2 05/20/2014       Depression:  Current medications include: olanzapine 5 mg at bedtime (decreasing dose, plan is to be off of it completely)- they have not noticed any paranoia or changes in mood with the lower dose, prazosin 3mg bedtime, and duloxetine 120 mg daily. No sleep changes. Follows psychiatrist Traci Pulido at [facility].   Past med trials: aripiprazole, fluoxetine, bupropion, sertraline, risperidone, amitriptyline, venlafaxine (all ineffective)  PHQ 6/18/2019 6/1/2020 8/27/2021   PHQ-9 Total Score 2 8 0   Q9: Thoughts of better off dead/self-harm past 2 weeks Not at all Several days Not at all   F/U: Thoughts of suicide or self-harm - No -   F/U: Safety concerns - No -       Hypertension: Current medications include lisinopril 10 mg daily and hydrochlorothiazide 25 mg daily.  Patient does not self-monitor blood pressure.  Patient reports no current medication side effects.  BP Readings from Last 3 Encounters:   10/27/21 128/65   08/27/21 122/78   06/02/21 (!) 143/83       Today's Vitals: There were no vitals taken for this visit.  ----------------      I spent 10 minutes with this patient today. All changes were made via collaborative practice agreement with Kory Hudson MD. A copy of the visit note was provided to the patient's primary care provider.    The patient was sent via [employer] a summary of these recommendations.     Dorina Benz, PharmD  Medication Therapy Management Provider, [facility]  Pager: [phone]    Telemedicine Visit Details  Type of service:  Telephone visit  Start Time: 10:12 AM  End Time: 10:22 AM  Originating Location (patient location): Home  Distant Location (provider location):  [facility]     Medication Therapy  Recommendations  Morbid obesity (H)    Current Medication: liraglutide - Weight Management (SAXENDA) 18 MG/3ML pen   Rationale: Cannot afford medication product - Cost - Adherence   Recommendation: Change Medication - Wegovy 2.4 MG/0.75ML Soaj   Status: Accepted per CPA

## 2021-12-14 NOTE — TELEPHONE ENCOUNTER
PA Initiation    Medication: Semaglutide-Weight Management (WEGOVY) 2.4 MG/0.75ML SOAJ  Insurance Company: [employer] - Phone [phone] Fax [phone]  Pharmacy Filling the Rx: [id number] - [place], [place] - [address]  Filling Pharmacy Phone: [phone]  Filling Pharmacy Fax: [phone]  Start Date: 12/14/2021    Kimo Greenwood (Orozco: [id number])

## 2021-12-14 NOTE — TELEPHONE ENCOUNTER
PRIOR AUTHORIZATION DENIED    Medication: Semaglutide-Weight Management (WEGOVY) 2.4 MG/0.75ML SOAJ    Denial Date: 12/14/2021    Denial Rationale: Medications used for weight loss are excluded from Medicare Part D coverage.        Appeal Information: If provider would like to appeal this decision we will need a detailed letter of medical necessity to start the process. Then re-route this request back to the PA pool.

## 2021-12-16 NOTE — TELEPHONE ENCOUNTER
Sent [employer] message regarding denial. His insurance now billing with Medicare Part D does not cover any weight loss medications.    Dorina Benz, PharmD  Medication Therapy Management Provider, [facility]  Pager: [phone]

## 2021-12-21 ENCOUNTER — TRANSFERRED RECORDS (OUTPATIENT)
Dept: HEALTH INFORMATION MANAGEMENT | Facility: CLINIC | Age: 41
End: 2021-12-21
Payer: MEDICARE

## 2021-12-21 RX ORDER — OLANZAPINE 5 MG/1
5 TABLET ORAL AT BEDTIME
Status: CANCELLED | OUTPATIENT
Start: 2021-12-21

## 2021-12-21 NOTE — TELEPHONE ENCOUNTER
Routing refill request to provider for review/approval because:  Medication is reported/historical    Shelton LITTLEJOHN RN

## 2021-12-21 NOTE — TELEPHONE ENCOUNTER
We have never filled that medication for him.  It looks like it's his psychiatry team.  Can we contact their office to notify them that it is an issue and needs action?

## 2021-12-21 NOTE — TELEPHONE ENCOUNTER
Called [place] [facility], they stated they were trying to call Traci Pulido's office at [facility], and was transferred to this clinic somehow.     Gave the pharmacist the number found on behavioral progress note.  They will call that office to get medication filled.     Amy DAWKINS RN

## 2021-12-21 NOTE — TELEPHONE ENCOUNTER
Pharmacy calling to check refill request that they have sent. Nothing in chart. Refill pended. Hoping this can be refilled today as patient is heading back home in the morning-will need to bring this with.  Barb Delgado,

## 2022-01-10 ENCOUNTER — TRANSFERRED RECORDS (OUTPATIENT)
Dept: HEALTH INFORMATION MANAGEMENT | Facility: CLINIC | Age: 42
End: 2022-01-10
Payer: COMMERCIAL

## 2022-01-18 DIAGNOSIS — K21.9 LPRD (LARYNGOPHARYNGEAL REFLUX DISEASE): ICD-10-CM

## 2022-01-18 DIAGNOSIS — I10 HYPERTENSION GOAL BP (BLOOD PRESSURE) < 140/90: ICD-10-CM

## 2022-01-18 DIAGNOSIS — K29.80 DUODENITIS: ICD-10-CM

## 2022-01-19 RX ORDER — LISINOPRIL 10 MG/1
TABLET ORAL
Qty: 90 TABLET | Refills: 0 | Status: SHIPPED | OUTPATIENT
Start: 2022-01-19 | End: 2022-02-21

## 2022-01-19 RX ORDER — PANTOPRAZOLE SODIUM 40 MG/1
TABLET, DELAYED RELEASE ORAL
Qty: 90 TABLET | Refills: 0 | Status: SHIPPED | OUTPATIENT
Start: 2022-01-19 | End: 2022-04-08

## 2022-01-19 NOTE — TELEPHONE ENCOUNTER
Patient has upcoming appointment 2/28/22 w/ [facility]. Prescription approved per [facility] Refill Protocol.  Shelton LITTLEJOHN RN

## 2022-01-25 ENCOUNTER — TELEPHONE (OUTPATIENT)
Dept: FAMILY MEDICINE | Facility: CLINIC | Age: 42
End: 2022-01-25
Payer: COMMERCIAL

## 2022-01-25 ENCOUNTER — VIRTUAL VISIT (OUTPATIENT)
Dept: PHARMACY | Facility: CLINIC | Age: 42
End: 2022-01-25
Attending: FAMILY MEDICINE
Payer: COMMERCIAL

## 2022-01-25 DIAGNOSIS — N32.81 OVERACTIVE BLADDER: ICD-10-CM

## 2022-01-25 DIAGNOSIS — G89.29 CHRONIC LOW BACK PAIN WITHOUT SCIATICA, UNSPECIFIED BACK PAIN LATERALITY: ICD-10-CM

## 2022-01-25 DIAGNOSIS — F32.1 MODERATE MAJOR DEPRESSION (H): ICD-10-CM

## 2022-01-25 DIAGNOSIS — E66.01 MORBID OBESITY (H): Primary | ICD-10-CM

## 2022-01-25 DIAGNOSIS — M54.50 CHRONIC LOW BACK PAIN WITHOUT SCIATICA, UNSPECIFIED BACK PAIN LATERALITY: ICD-10-CM

## 2022-01-25 DIAGNOSIS — I10 ESSENTIAL HYPERTENSION: ICD-10-CM

## 2022-01-25 DIAGNOSIS — K22.70 BARRETT'S ESOPHAGUS DETERMINED BY BIOPSY: ICD-10-CM

## 2022-01-25 PROCEDURE — 99605 MTMS BY PHARM NP 15 MIN: CPT | Performed by: PHARMACIST

## 2022-01-25 PROCEDURE — 99607 MTMS BY PHARM ADDL 15 MIN: CPT | Performed by: PHARMACIST

## 2022-01-25 RX ORDER — MIRABEGRON 50 MG/1
TABLET, FILM COATED, EXTENDED RELEASE ORAL
COMMUNITY
Start: 2022-01-18 | End: 2022-01-25

## 2022-01-25 RX ORDER — OLANZAPINE 2.5 MG/1
2.5 TABLET, FILM COATED ORAL AT BEDTIME
COMMUNITY
Start: 2022-01-12 | End: 2022-03-09 | Stop reason: DRUGHIGH

## 2022-01-25 RX ORDER — LIDOCAINE 4 G/G
1-2 PATCH TOPICAL EVERY 24 HOURS
Qty: 60 PATCH | Refills: 1 | Status: SHIPPED | OUTPATIENT
Start: 2022-01-25 | End: 2023-09-01

## 2022-01-25 RX ORDER — SEMAGLUTIDE 0.25 MG/.5ML
0.25 INJECTION, SOLUTION SUBCUTANEOUS WEEKLY
Qty: 2 ML | Refills: 1 | Status: SHIPPED | OUTPATIENT
Start: 2022-01-25 | End: 2022-02-08

## 2022-01-25 NOTE — LETTER
_  Medication List        Prepared on: 2/8/2022     Bring your Medication List when you go to the doctor, hospital, or   emergency room. And, share it with your family or caregivers.     Note any changes to how you take your medications.  Cross out medications when you no longer use them.    Medication How I take it Why I use it Prescriber   albuterol (VENTOLIN HFA) 108 (90 Base) MCG/ACT inhaler INHALE 2 PUFFS INTO LUNGS EVERY SIX HOURS AS NEEDED FOR SHORTNESS OF BREATH / DYSPNEA OR WHEEZING Mild intermittent asthma without complication Kory Hudson MD   DULoxetine (CYMBALTA) 60 MG capsule Take 120 mg by mouth daily  Depression  LAMBERT Rueda, CNP   FIBER- MG tablet TAKE 2 TABLETS BY MOUTH TWICE A DAY Slow Transit Constipation Kory Hudson MD   hydrochlorothiazide (HYDRODIURIL) 25 MG tablet Take 1 tablet (25 mg) by mouth daily Essential Hypertension Kory Hudson MD   Lidocaine (LIDOCARE) 4 % Patch Place 1-2 patches onto the skin every 24 hours To prevent lidocaine toxicity, patient should be patch free for 12 hrs daily. Chronic low back pain without sciatica, unspecified back pain laterality Kory Hudson MD   lisinopril (ZESTRIL) 10 MG tablet TAKE 1 TABLET BY MOUTH EVERY MORNING Hypertension Goal BP (Blood Pressure) < 140/90 Kory Hudson MD   naproxen sodium 220 MG capsule If Tylenol is not helpful, this can be added 2 tabs twice a day with meals. Dislocation of left hip, sequela Kory Hudson MD   OLANZapine (ZYPREXA) 2.5 MG tablet Take 2.5 mg by mouth At Bedtime In addition to 5 mg for 7.5 mg at bedtime Depression  LAMBERT Rueda, CNP   OLANZapine (ZYPREXA) 5 MG tablet Take 5 mg by mouth At Bedtime Depression  LAMBERT Rueda, CNP   oxybutynin ER (DITROPAN XL) 15 MG 24 hr tablet Take 30 mg by mouth daily Urinary incontinence  Kory Hudson MD   pantoprazole (PROTONIX) 40 MG EC tablet TAKE 1 TABLET BY MOUTH 30-60 MINUTE(S) BEFORE FIRST MEAL OF THE DAY Duodenitis;  LPRD (Laryngopharyngeal Reflux Disease) Kory Hudson MD   prazosin (MINIPRESS) 1 MG capsule Take 3 mg by mouth At Bedtime Depression  LAMBERT Rueda, CNP   Semaglutide-Weight Management (WEGOVY) 0.25 MG/0.5ML SOAJ Inject 0.25 mg Subcutaneous once a week Morbid Obesity (H) Kory Hudson MD   VITAMIN D3 50 MCG (2000 UT) tablet TAKE ONE TABLET BY MOUTH EVERY DAY Vitamin D Deficiency Kory Hudson MD         Add new medications, over-the-counter drugs, herbals, vitamins, or  minerals in the blank rows below.    Medication How I take it Why I use it Prescriber                          Allergies:      other [no clinical screening - see comments]        Side effects I have had:              Other Information:             My notes and questions:

## 2022-01-25 NOTE — PATIENT INSTRUCTIONS
Recommendations from today's MTM visit:                                                       1. Start Wegovy 0.25 mg weekly.  2. Use lidocaine 4% patches daily. If this helps pain, may decrease naproxen use to as needed rather than twice daily scheduled.    It was great to speak with you today.  I value your experience and would be very thankful for your time with providing feedback on our clinic survey. You may receive a survey via email or text message in the next few days.     To schedule another MTM appointment, please call the clinic directly or you may call the MTM scheduling line at [phone] or toll-free at [phone].     My Clinical Pharmacist's contact information:                                                      Please feel free to contact me with any questions or concerns you have.      Dorina Benz, PharmD  Medication Therapy Management Provider, [facility]

## 2022-01-25 NOTE — TELEPHONE ENCOUNTER
Prior Authorization Retail Medication Request    Medication/Dose: Lidocaine (LIDOCARE) 4 % Patch  ICD code (if different than what is on RX):    Previously Tried and Failed: None   Rationale:  Patient has been using this medication since 10/30/2015 for his chonic back pain.    COVER[employer]S    KEY: [id number]  PATIENT LAST NAME: CATARINA  : 1980      Pharmacy Information (if different than what is on RX)  Name:    Phone:        Santiago AGUILAR

## 2022-01-25 NOTE — LETTER
February 8, 2022  Kimo ABE Timo  [address] [facility] [address]  [place] [address]    Dear Mr. Greenwood [facility]        Thank you for talking with me on Jan 25, 2022 about your health and medications. As a follow-up to our conversation, I have included two documents:      1. Your Recommended To-Do List has steps you should take to get the best results from your medications.  2. Your Medication List will help you keep track of your medications and how to take them.    If you want to talk about these documents, please call Dorina Benz RPH at phone: [phone], Monday-Friday 8-4:30pm.    I look forward to working with you and your doctors to make sure your medications work well for you.    Sincerely,    Dorina Benz RPH

## 2022-01-25 NOTE — PROGRESS NOTES
Medication Therapy Management (MTM) Encounter    ASSESSMENT:                            Medication Adherence/Access: No issues identified      Obesity: BMI >30 so qualifies for weight loss medication. Will start Wegovy as GLP-1 agonists have been effective for him in the past. Educated on use and side effects.     Depression:  Stable following psychiatry.   Hypertension: stable  GERD: stable    Incontinence: Dalia will check on Myrbetriq drug coverage again as this may be covered with new insurance.     Pain: Recommend using lidocaine patches as needed to help decrease need of chronic NSAID use.     PLAN:                            1. Start Wegovy 0.25 mg weekly.   2. Use lidocaine 4% patches daily. If this helps pain, may decrease naproxen use to as needed rather than twice daily scheduled.    Follow-up: Return in about 1 month (around 2/25/2022) for Medication Therapy Management Pharmacist.    SUBJECTIVE/OBJECTIVE:                          Kimo Greenwood is a 41 year old male contacted via secure video for a follow-up visit. Patient was accompanied by mom Dalia. Today's visit is a follow-up MTM visit from 12/13.     Reason for visit: weight loss medication.    Tobacco: He reports that he has quit smoking. His smoking use included vaping device. He has a 1.00 pack-year smoking history. He has never used smokeless tobacco.  Alcohol: not currently using    Medication Adherence/Access: [employer] insurance again so thinks weight loss medication will be covered again.  Patient uses pill box(es).     Obesity: Current medication(s) include: none. Saxenda was effective when he was able to get it. Needs weight loss for hip surgery.  Failed medications include: phentermine (ineffective), bupropion (for depression), topiramate (ineffective).   Weight trend:   Wt Readings from Last 3 Encounters:   08/27/21 (!) 384 lb (174.2 kg)   01/22/21 (!) 379 lb (171.9 kg)   10/28/20 (!) 352 lb (159.7 kg)     Patient's comorbidities  associated with obesity include: Hypertension, Obstructive Sleep Apnea, Arthritis, GERD and Depression.  Lab Results   Component Value Date    A1C 5.1 08/27/2021    A1C 5.1 12/24/2019    A1C 4.6 03/20/2017    A1C 4.9 01/25/2016    A1C 5.1 08/01/2015    A1C 5.2 05/20/2014     Wt Readings from Last 3 Encounters:   08/27/21 (!) 384 lb (174.2 kg)   01/22/21 (!) 379 lb (171.9 kg)   10/28/20 (!) 352 lb (159.7 kg)       Depression:  Current medications include: olanzapine 7.5 mg at bedtime (plan was to get off but dose increased again recently d/t worsened depression lately which has helped), prazosin 3mg bedtime, and duloxetine 120 mg daily. No sleep changes. Follows psychiatrist Traci Pulido at [facility]. Willard reports today that mood and depression is stable, slightly improved.  Past med trials: aripiprazole, fluoxetine, bupropion, sertraline, risperidone, amitriptyline, venlafaxine (all ineffective)  PHQ 6/18/2019 6/1/2020 8/27/2021   PHQ-9 Total Score 2 8 0   Q9: Thoughts of better off dead/self-harm past 2 weeks Not at all Several days Not at all   F/U: Thoughts of suicide or self-harm - No -   F/U: Safety concerns - No -       Hypertension: Current medications include lisinopril 10 mg daily and hydrochlorothiazide 25 mg daily.  Patient does not self-monitor blood pressure.  Patient reports no current medication side effects.  BP Readings from Last 3 Encounters:   10/27/21 128/65   08/27/21 122/78   06/02/21 (!) 143/83       GERD: Current medications include: Protonix (pantoprazole) 40mg once daily. Pt reports no current symptoms.  Patient feels that current regimen is effective.    Incontinence: Patient taking oxybutynin ER 30 mg daily. Has not gotten Myrbetriq - not covered by his insurance? Still some leaking during the day.    Pain: Patient taking naproxen 440 mg twice daily and wonders if this should be scheduled? Has not been using lidocaine patches. Does have pain around his hips.   Creatinine   Date  Value Ref Range Status   08/27/2021 0.97 0.66 - 1.25 mg/dL Final   10/26/2020 0.86 0.66 - 1.25 mg/dL Final         Today's Vitals: There were no vitals taken for this visit.  ----------------      I spent 30 minutes with this patient today. All changes were made via collaborative practice agreement with Kory Hudson MD. A copy of the visit note was provided to the patient's provider(s).    The patient was sent via [employer] a summary of these recommendations.     Dorina Benz, PharmD  Medication Therapy Management Provider, [facility]  Pager: [phone]    Telemedicine Visit Details  Type of service:  Video Conference via [employer]  Start Time: 11:01 AM  End Time: 11:30 AM  Originating Location (patient location): Home  Distant Location (provider location):  [facility]     Medication Therapy Recommendations  Chronic low back pain    Current Medication: Lidocaine (LIDOCARE) 4 % Patch   Rationale: Synergistic therapy - Needs additional medication therapy - Indication   Recommendation: Start Medication   Status: Accepted per CPA         Morbid obesity (H)    Current Medication: Semaglutide-Weight Management (WEGOVY) 0.25 MG/0.5ML SOAJ   Rationale: Untreated condition - Needs additional medication therapy - Indication   Recommendation: Start Medication   Status: Accepted per CPA

## 2022-01-28 NOTE — TELEPHONE ENCOUNTER
PRIOR AUTHORIZATION DENIED    Medication: Lidocaine (LIDOCARE) 4 % Patch    Denial Date: 1/28/2022    Denial Rationale: Medication is available over the counter- excluded from coverage    Appeal Information: N/A- no PA or appeal can be done. For more information, please reach out to [employer] directly at [phone]

## 2022-02-01 ENCOUNTER — TELEPHONE (OUTPATIENT)
Dept: FAMILY MEDICINE | Facility: CLINIC | Age: 42
End: 2022-02-01
Payer: COMMERCIAL

## 2022-02-01 DIAGNOSIS — E66.01 MORBID OBESITY (H): Primary | ICD-10-CM

## 2022-02-01 NOTE — TELEPHONE ENCOUNTER
Patient was already instructed to pursue OTC option for lidocaine patches, see MyC encounter 2/1/22.     Shelton LITTLEJOHN RN

## 2022-02-08 RX ORDER — LIRAGLUTIDE 6 MG/ML
INJECTION, SOLUTION SUBCUTANEOUS
Qty: 27 ML | Refills: 1 | Status: SHIPPED | OUTPATIENT
Start: 2022-02-08 | End: 2022-03-23

## 2022-02-08 NOTE — TELEPHONE ENCOUNTER
Informed patient via mychart. Will try Saxenda again instead.    Dorina Benz, PharmD  Medication Therapy Management Provider, [facility]  Pager: [phone]

## 2022-02-08 NOTE — TELEPHONE ENCOUNTER
PRIOR AUTHORIZATION DENIED    Medication: WEGOVY 0.25 MG/0.5ML SOAJ    Denial Date: 2/8/2022  Denial Rational:         Appeal Information:None        Medication: WEGOVY 0.25 MG/0.5ML SOAJ  Insurance Company: [employer] - Phone [phone] Fax [phone]  Pharmacy Filling the Rx: [id number] - [place], [place] - [address]  Filling Pharmacy Phone: [phone]  Filling Pharmacy Fax:    Start Date: 2/8/2022

## 2022-02-09 NOTE — TELEPHONE ENCOUNTER
Would like to appeal and can use 9/1/21 appeal letter for semaglutide written by PCP.    Dorina Benz, PharmD  Medication Therapy Management Provider, [facility]  Pager: [phone]

## 2022-02-15 NOTE — TELEPHONE ENCOUNTER
PRIOR AUTHORIZATION DENIED    Medication: WEGOVY 0.25 MG/0.5ML SOAJ    Denial Date: 2/11/2022    Denial Rational:     Appeal Information:   This rejection is after sending appeal letter and returning another question set to insurance.

## 2022-02-16 NOTE — TELEPHONE ENCOUNTER
PA denied, no alternatives offered. Called and spoke with pt mom, CHARANJIT on file. Mom states that medication needs to be sent through [employer] insurance. This PA was submitted under Medicare part D. Medicare part D is primary insurance and [employer] is supplement. Would like resubmit it through [employer] insurance. Pt will update chart with insurance. If any questions, pt mom informs to contact her at [phone].     Matthew LIVE RN

## 2022-02-21 ENCOUNTER — OFFICE VISIT (OUTPATIENT)
Dept: FAMILY MEDICINE | Facility: CLINIC | Age: 42
End: 2022-02-21
Payer: COMMERCIAL

## 2022-02-21 VITALS
SYSTOLIC BLOOD PRESSURE: 134 MMHG | DIASTOLIC BLOOD PRESSURE: 74 MMHG | HEIGHT: 71 IN | HEART RATE: 86 BPM | RESPIRATION RATE: 22 BRPM | OXYGEN SATURATION: 97 % | BODY MASS INDEX: 44.1 KG/M2 | WEIGHT: 315 LBS | TEMPERATURE: 98.2 F

## 2022-02-21 DIAGNOSIS — J18.9 PNEUMONIA OF RIGHT LOWER LOBE DUE TO INFECTIOUS ORGANISM: Primary | ICD-10-CM

## 2022-02-21 DIAGNOSIS — R19.00 RETROPERITONEAL MASS: ICD-10-CM

## 2022-02-21 PROCEDURE — 99495 TRANSJ CARE MGMT MOD F2F 14D: CPT | Performed by: FAMILY MEDICINE

## 2022-02-21 ASSESSMENT — PATIENT HEALTH QUESTIONNAIRE - PHQ9
10. IF YOU CHECKED OFF ANY PROBLEMS, HOW DIFFICULT HAVE THESE PROBLEMS MADE IT FOR YOU TO DO YOUR WORK, TAKE CARE OF THINGS AT HOME, OR GET ALONG WITH OTHER PEOPLE: NOT DIFFICULT AT ALL
SUM OF ALL RESPONSES TO PHQ QUESTIONS 1-9: 1
SUM OF ALL RESPONSES TO PHQ QUESTIONS 1-9: 1

## 2022-02-21 NOTE — PROGRESS NOTES
"  Assessment & Plan     Pneumonia of right lower lobe due to infectious organism  Follow-up with repeat CT.  Recommend follow-up lab tests patient has follow-up clinic visit with me in 1 week and can do them at that time.  Clinically doing well at this time.  No issues with aspiration with speech therapist.  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  Consideration for excisional biopsy depending on general surgery evaluation, referral below.  - Adult General Surg Referral; Future     BMI:   Estimated body mass index is 51.8 kg/m  as calculated from the following:    Height as of this encounter: 1.803 m (5' 11\").    Weight as of this encounter: 168.5 kg (371 lb 6.4 oz).   Weight management plan: Discussed healthy diet and exercise guidelines    No follow-ups on file.    Kory Hudson MD  [facility] SOPHIE Lamar is a 41 year old who presents for the following health issues  accompanied by his mother.    [facility]       Hospital Follow-up Visit:    Hospital/Nursing Home/IP Rehab Facility: [facility]   Date of Admission: 02/17/2022  Date of Discharge:02/20/22  Reason(s) for Admission: Pneumonia      Was your hospitalization related to COVID-19? No   Problems taking medications regularly:  None  Medication changes since discharge: None  Problems adhering to non-medication therapy:  None    Summary of hospitalization:  CareEverywhere information obtained and reviewed  Diagnostic Tests/Treatments reviewed.  Follow up needed: CT follow-up    Other Healthcare Providers Involved in Patient s Care:         None  Update since discharge: improved.       Post Discharge Medication Reconciliation: discharge medications reconciled, continue medications without change.  Plan of care communicated with patient              Patient with recent hospitalization at [facility] in [place] for right lower lobe " "pneumonia thought to be aspiration pneumonia.  There was recommendation to have repeat CT scan to rule out mass and to have follow-up for aspiration and rule out parapneumonic abscess with repeat CT scan in 4 to 6 weeks.  No major concern for aspiration per speech pathologist evaluation, patient on regular diet.  At this time Willard is doing well without chest pain, cough, or fever.    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 On CT abdomen it appears that the mass is well capsulated, no evidence of metastasis/lymphadenopathy. It was recommend follow up with general surgery to consider excisional biopsy of the mass.    Review of Systems         Objective    /74   Pulse 86   Temp 98.2  F (36.8  C) (Oral)   Resp 22   Ht 1.803 m (5' 11\")   Wt (!) 168.5 kg (371 lb 6.4 oz)   SpO2 97%   BMI 51.80 kg/m    Body mass index is 51.8 kg/m .  Physical Exam   GENERAL: alert, no distress and obese  RESP: lungs clear to auscultation - no rales, rhonchi or wheezes  CV: regular rate and rhythm, normal S1 S2, no S3 or S4, no murmur, click or rub, no peripheral edema and peripheral pulses strong                "

## 2022-02-22 ASSESSMENT — PATIENT HEALTH QUESTIONNAIRE - PHQ9: SUM OF ALL RESPONSES TO PHQ QUESTIONS 1-9: 1

## 2022-02-23 ENCOUNTER — TRANSCRIBE ORDERS (OUTPATIENT)
Dept: OTHER | Age: 42
End: 2022-02-23
Payer: COMMERCIAL

## 2022-02-23 ENCOUNTER — PATIENT OUTREACH (OUTPATIENT)
Dept: SURGERY | Facility: CLINIC | Age: 42
End: 2022-02-23
Payer: COMMERCIAL

## 2022-02-23 DIAGNOSIS — R19.00 RETROPERITONEAL MASS: Primary | ICD-10-CM

## 2022-02-24 ENCOUNTER — TRANSFERRED RECORDS (OUTPATIENT)
Dept: HEALTH INFORMATION MANAGEMENT | Facility: CLINIC | Age: 42
End: 2022-02-24

## 2022-02-24 NOTE — PROGRESS NOTES
New Patient Oncology Nurse Navigator Note     Referring provider: Dr. Hudson     Referring Clinic/Organization: [facility]     Referred to: Surgical Oncology      Requested provider (if applicable): Dr. Star Ojeda    Referral Received: 02/24/22       Evaluation for : Retroperitoneal mass      Clinical History (per Nurse review of records provided):      See book marked documents:       Referring MD office note    Imaging reports     Lab Results   Component Value Date/Time    ALBUMIN 4.1 08/27/2021 11:56 AM    ALBUMIN 3.9 08/27/2019 08:50 PM    AST 17 08/27/2021 11:56 AM    AST 14 08/27/2019 08:50 PM    ALT 32 08/27/2021 11:56 AM    ALT 27 08/27/2019 08:50 PM    ALKPHOS 71 08/27/2021 11:56 AM    ALKPHOS 77 08/27/2019 08:50 PM    BILITOTAL 0.4 08/27/2021 11:56 AM    BILITOTAL 0.3 08/27/2019 08:50 PM    WBC 11.7 (H) 08/27/2021 11:56 AM    WBC 8.4 10/26/2020 10:55 AM          Past Medical History:   Diagnosis Date     Arthritis     DDD hips and knees     Asthma      Asthma      Patel's esophagus      Chronic pain      Chronic pain - left hip/leg pain     degenerative disc disease, back, hips, knees     Gastroesophageal reflux disease      History of anesthesia complications     agitation x1 after interstim 2013     Hypertension      Hypertension      Leukocytosis      LPRD (laryngopharyngeal reflux disease)      Marijuana abuse      Marijuana abuse      MDD (major depressive disorder)      MDD (major depressive disorder)      Mental retardation, mild (I.Q. 50-70)      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    With associated speech/language delay     Obesity 11/11/2010     LOREN (obstructive sleep apnea)     Uses a CPAP     LOREN (obstructive sleep apnea)      Seborrheic dermatitis      Seborrheic dermatitis      Sleep apnea     CPAP       Past Surgical History:   Procedure Laterality Date     ARTHROPLASTY HIP Left 1/25/2017    Procedure: ARTHROPLASTY HIP;  Surgeon: Bala Randall MD;  Location:  [facility] OR     ARTHROPLASTY HIP Left      ARTHROPLASTY REVISION HIP Left 6/24/2019    Procedure: Revision left total hip arthroplasty with a head and liner exchange to a Biomet dual mobility head and liner.;  Surgeon: Bala Randall MD;  Location: [facility] OR     BLADDER SURGERY      Ibarra, [facility],Interstem stimulator implant     CLOSED REDUCTION HIP Left 6/10/2019    Procedure: Closed reduction under general anesthesia left recurrent prosthetic hip dislocation;  Surgeon: Rafat Cazares MD;  Location: [facility] OR     COLONOSCOPY N/A 10/30/2015    Procedure: COMBINED COLONOSCOPY, SINGLE OR MULTIPLE BIOPSY/POLYPECTOMY BY BIOPSY;  Surgeon: Alexis Jackson MD;  Location: [facility] GI     COLONOSCOPY N/A 11/17/2016    Procedure: COMBINED COLONOSCOPY, SINGLE OR MULTIPLE BIOPSY/POLYPECTOMY BY BIOPSY;  Surgeon: Cat Huber MD;  Location: [facility] GI     COLONOSCOPY N/A 10/28/2020    Procedure: COLONOSCOPY;  Surgeon: You Kraus MD;  Location: [facility] OR     COLONOSCOPY       ESOPHAGOSCOPY, GASTROSCOPY, DUODENOSCOPY (EGD), COMBINED N/A 11/17/2016    Procedure: COMBINED ESOPHAGOSCOPY, GASTROSCOPY, DUODENOSCOPY (EGD), BIOPSY SINGLE OR MULTIPLE;  Surgeon: Cat Huber MD;  [facility] GI     ESOPHAGOSCOPY, GASTROSCOPY, DUODENOSCOPY (EGD), COMBINED N/A 3/12/2020    Procedure: ESOPHAGOGASTRODUODENOSCOPY WITH BIOPSIES;  Surgeon: You Kraus MD;  Location:  OR     ESOPHAGOSCOPY, GASTROSCOPY, DUODENOSCOPY (EGD), COMBINED N/A 10/28/2020    Procedure: ESOPHAGOGASTRODUODENOSCOPY, WITH BIOPSY;  Surgeon: You Kraus MD;  Location: [facility] OR     ESOPHAGOSCOPY, GASTROSCOPY, DUODENOSCOPY (EGD), COMBINED       EXAM UNDER ANESTHESIA, FULGURATE CONDYLOMA ANUS, COMBINED N/A 12/22/2016    Procedure: COMBINED EXAM UNDER ANESTHESIA, FULGURATE CONDYLOMA ANUS;  Surgeon: Nya Cabello MD;  [facility] OR     GENITOURINARY SURGERY       JOINT REPLACEMENT Left 2017    MARGO, Revision Left MARGO     OTHER  SURGICAL HISTORY      interstim stimulator implant     KY CYSTOURETHROSCOPY INJ CHEMODENERVATION BLADDER N/A 1/16/2019    Procedure: CYSTOSCOPY BOTOX BLADDER INJECTIONS (100 UNITS IN 10CC);  Surgeon: Didier Ibarra MD;  Location: [facility];  Service: Urology     KY IMPLANT PERIPH/GASTRIC NEUROSTIM/ N/A 1/8/2020    Procedure: NEW INTERSTIM LEAD, REPLACE OLD; NEW INTERSTIM BATTERY, REPLACE OLD;  Surgeon: Didier Ibarra MD;  Location: [facility];  Service: Urology       Current Outpatient Medications   Medication Sig Dispense Refill     albuterol (VENTOLIN HFA) 108 (90 Base) MCG/ACT inhaler INHALE 2 PUFFS INTO LUNGS EVERY SIX HOURS AS NEEDED FOR SHORTNESS OF BREATH / DYSPNEA OR WHEEZING 1 Inhaler 11     DULoxetine (CYMBALTA) 60 MG capsule Take 120 mg by mouth daily        FIBER- MG tablet TAKE 2 TABLETS BY MOUTH TWICE A  tablet 3     hydrochlorothiazide (HYDRODIURIL) 25 MG tablet Take 1 tablet (25 mg) by mouth daily 90 tablet 3     Lidocaine (LIDOCARE) 4 % Patch Place 1-2 patches onto the skin every 24 hours To prevent lidocaine toxicity, patient should be patch free for 12 hrs daily. 60 patch 1     liraglutide - Weight Management (SAXENDA) 18 MG/3ML pen Inject 0.6 mg Subcutaneous daily for 7 days, THEN 1.2 mg daily for 7 days, THEN 1.8 mg daily for 7 days, THEN 2.4 mg daily for 7 days, THEN 3 mg daily. 27 mL 1     naproxen sodium 220 MG capsule If Tylenol is not helpful, this can be added 2 tabs twice a day with meals. 60 capsule 3     OLANZapine (ZYPREXA) 2.5 MG tablet Take 2.5 mg by mouth At Bedtime In addition to 5 mg for 7.5 mg at bedtime       OLANZapine (ZYPREXA) 5 MG tablet Take 5 mg by mouth At Bedtime       order for DME Equipment being ordered: CPAP supplies 1 each 0     oxybutynin ER (DITROPAN XL) 15 MG 24 hr tablet Take 30 mg by mouth daily       pantoprazole (PROTONIX) 40 MG EC tablet TAKE 1 TABLET BY MOUTH 30-60 MINUTE(S) BEFORE FIRST MEAL OF THE DAY 90 tablet 0      prazosin (MINIPRESS) 1 MG capsule Take 3 mg by mouth At Bedtime       VITAMIN D3 50 MCG (2000 UT) tablet TAKE ONE TABLET BY MOUTH EVERY DAY 90 tablet 3           Allergies   Allergen Reactions     Other [No Clinical Screening - See Comments] Other (See Comments)     Awoke from anesthesia with anger and ripping off dressing, after interstim placement, outside hospital 2013          Patient Active Problem List   Diagnosis     Speech/language delay     Tobacco use disorder     Nocturnal enuresis     Moderate major depression (H)     LOREN (obstructive sleep apnea)     Essential hypertension     Morbid obesity (H)     Leukocytosis     Suicidal ideation     Chronic low back pain     Bilateral low back pain with left-sided sciatica     History of colonic polyps     Mild intellectual disability     S/P total hip arthroplasty     Vitamin D deficiency     LPRD (laryngopharyngeal reflux disease)     Patel's esophagus determined by biopsy     Mild intermittent asthma     Somatic dysfunction of lumbar region     Somatic dysfunction of sacral region     Sacral pain     Thoracic segment dysfunction     Hip pain, left     Duodenitis     Overactive bladder     Venous stasis dermatitis of both lower extremities     Severe recurrent major depressive disorder with psychotic features (H)     Alcohol use disorder, mild, abuse     Cannabis use disorder, mild, abuse         Clinical Assessment / Barriers to Care (Per Nurse):    None at this time.     Records Location:     [facility] Everywhere     Records Needed:     ABDOMINAL IMAGING (CT SCANS, MRI, US)  DATING BACK TO 2020      Additional testing needed prior to consult:     NONE AT THIS TIME    Referral updates and Plan:       Consult with Surgical Oncology     Cindy Coburn RN, BSN   Surgical Oncology New Patient Nurse Navigator  [facility]  [phone]

## 2022-02-24 NOTE — PROGRESS NOTES
Pre-Visit Planning   Next 5 appointments (look out 90 days)    Mar 09, 2022 11:30 AM  Pharmacist Visit with Dorina Benz RPH  [facility] ([facility] ) [address] [address] [id number] [phone]   Mar 14, 2022 11:00 AM  (Arrive by 10:40 AM)  Annual Wellness Visit with Kory Hudson MD  [facility] ([facility] ) [address] [address] [facility] [id number] [phone]   May 09, 2022 10:00 AM  Pharmacist Visit with Dorina Benz RPH  [facility] ([facility] ) [address] [address] [facility] [id number] [phone]        Appointment Notes for this encounter:  AWV- diarrhea     Questionnaires Reviewed/Assigned  ACT     Contacted family MyChart. Are there any additional questions or concerns you'd like to review with your provider during your visit? Yes: issues with loose stools wondering about food allergy     Visit is preventive. Reviewed purpose of preventive visit with patient.    Meds  Home Meds reviewed and updated    Entered patient-preferred pharmacy.     Current Outpatient Medications   Medication     albuterol (VENTOLIN HFA) 108 (90 Base) MCG/ACT inhaler     DULoxetine (CYMBALTA) 60 MG capsule     FIBER- MG tablet     hydrochlorothiazide (HYDRODIURIL) 25 MG tablet     Lidocaine (LIDOCARE) 4 % Patch     liraglutide - Weight Management (SAXENDA) 18 MG/3ML pen     naproxen sodium 220 MG capsule     OLANZapine (ZYPREXA) 2.5 MG tablet     OLANZapine (ZYPREXA) 5 MG tablet     order for DME     oxybutynin ER (DITROPAN XL) 15 MG 24 hr tablet     pantoprazole (PROTONIX) 40 MG EC tablet     prazosin (MINIPRESS) 1 MG capsule     VITAMIN D3 50 MCG (2000 UT) tablet     No current facility-administered medications for this visit.     Health Maintenance   Health Maintenance Due   Topic Date Due     ADVANCE CARE PLANNING  Never  done     MEDICARE ANNUAL WELLNESS VISIT  01/22/2022     ASTHMA CONTROL TEST  02/27/2022     Health Maintenance reviewed and Health Maintenance orders pended    MyChart  Patient is active on MyChart.      Call Summary  Advised patient to call back at [phone] if needed.     Eliana Concepcion RN

## 2022-02-25 ENCOUNTER — PRE VISIT (OUTPATIENT)
Dept: ONCOLOGY | Facility: CLINIC | Age: 42
End: 2022-02-25
Payer: COMMERCIAL

## 2022-02-25 NOTE — TELEPHONE ENCOUNTER
RECORDS STATUS - ALL OTHER DIAGNOSIS      RECORDS RECEIVED FROM: Clay Jay   DATE RECEIVED: 2/25   NOTES STATUS DETAILS   OFFICE NOTE from referring provider Epic 2/21/22   DISCHARGE SUMMARY from hospital CE- [place] 2/17/22   MEDICATION LIST [facility] 2/8/22   LABS     ANYTHING RELATED TO DIAGNOSIS Epic/CIRA 2/20/22   IMAGING (NEED IMAGES & REPORT)     CT SCANS PACS 2/19/22, 2/18/22: Clay

## 2022-02-28 ENCOUNTER — TRANSFERRED RECORDS (OUTPATIENT)
Dept: HEALTH INFORMATION MANAGEMENT | Facility: CLINIC | Age: 42
End: 2022-02-28

## 2022-02-28 ENCOUNTER — ONCOLOGY VISIT (OUTPATIENT)
Dept: ONCOLOGY | Facility: CLINIC | Age: 42
End: 2022-02-28
Attending: SURGERY
Payer: COMMERCIAL

## 2022-02-28 VITALS
WEIGHT: 315 LBS | BODY MASS INDEX: 45.1 KG/M2 | HEIGHT: 70 IN | HEART RATE: 82 BPM | OXYGEN SATURATION: 97 % | SYSTOLIC BLOOD PRESSURE: 137 MMHG | TEMPERATURE: 98.7 F | DIASTOLIC BLOOD PRESSURE: 87 MMHG

## 2022-02-28 DIAGNOSIS — R19.00 RETROPERITONEAL MASS: ICD-10-CM

## 2022-02-28 PROCEDURE — G0463 HOSPITAL OUTPT CLINIC VISIT: HCPCS

## 2022-02-28 PROCEDURE — 99202 OFFICE O/P NEW SF 15 MIN: CPT | Performed by: SURGERY

## 2022-02-28 ASSESSMENT — PAIN SCALES - GENERAL: PAINLEVEL: EXTREME PAIN (8)

## 2022-02-28 NOTE — NURSING NOTE
"Oncology Rooming Note    February 28, 2022 3:41 PM   Kimo Greenwood is a 41 year old male who presents for:    Chief Complaint   Patient presents with     Oncology Clinic Visit     Retroperitoneal mass     Initial Vitals: /87 (BP Location: Right arm, Patient Position: Sitting, Cuff Size: Adult Large)   Pulse 82   Temp 98.7  F (37.1  C) (Oral)   Ht 1.781 m (5' 10.12\")   Wt (!) 165.3 kg (364 lb 6.4 oz)   SpO2 97%   BMI 52.11 kg/m   Estimated body mass index is 52.11 kg/m  as calculated from the following:    Height as of this encounter: 1.781 m (5' 10.12\").    Weight as of this encounter: 165.3 kg (364 lb 6.4 oz). Body surface area is 2.86 meters squared.  Extreme Pain (8) Comment: Data Unavailable   No LMP for male patient.  Allergies reviewed: Yes  Medications reviewed: Yes    Medications: Medication refills not needed today.  Pharmacy name entered into [facility]: [id number] - [place], [place] - [address]    Clinical concerns: None     Primitivo Rodriguez            "

## 2022-02-28 NOTE — LETTER
2/28/2022     RE: Kimo Greenwood  [address] [address] [address]    Dear Colleague,    Thank you for referring your patient, Kimo Greenwood, to the [facility]. Please see a copy of my visit note below.    HISTORY OF PRESENT ILLNESS:  Kimo Greenwood is a 41-year-old man I was asked to see at the request of Dr. Chen Florence for evaluation of a retroperitoneal mass.       The patient was recently hospitalized for pneumonia and had a CT scan as part of his evaluation.  An incidental mass was noted in his left retroperitoneum.  He had a CT scan of the abdomen and pelvis, which I reviewed.  This demonstrates an 8.7 cm lipomatous mass in the retroperitoneum near the kidney and spleen.  This has the characteristics of a benign lipoma.  He has had no previous CAT scans for comparison.  He denies any symptoms of back pain or abdominal pain.    PAST MEDICAL HISTORY:  Obesity, pneumonia.    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    PLAN:  I have recommended a CT-guided biopsy.  If the CT-guided biopsy shows a lipoma, then he would not need any further followup.  If the CT biopsy is indeterminate, then I would recommend followup CT scan in about 6 months.  If the CT scan shows liposarcoma, then I would recommend excision.  We will get a CT-guided needle biopsies performed as soon as possible.    Total time 22 minutes, which included a review of his imaging, in-person visit and coordinating his care.    Again, thank you for allowing me to participate in the care of your patient.      Sincerely,    Star Ojeda MD  cc:   Kory Hudson MD   [facility]  [address] [place] [address]     Chen Florence MD  [address], [facility] [address]  [place] [address]

## 2022-02-28 NOTE — PROGRESS NOTES
HISTORY OF PRESENT ILLNESS:  Kimo Greenwood is a 41-year-old man I was asked to see at the request of Dr. Chen Florence for evaluation of a retroperitoneal mass.       The patient was recently hospitalized for pneumonia and had a CT scan as part of his evaluation.  An incidental mass was noted in his left retroperitoneum.  He had a CT scan of the abdomen and pelvis, which I reviewed.  This demonstrates an 8.7 cm lipomatous mass in the retroperitoneum near the kidney and spleen.  This has the characteristics of a benign lipoma.  He has had no previous CAT scans for comparison.  He denies any symptoms of back pain or abdominal pain.    PAST MEDICAL HISTORY:  Obesity, pneumonia.    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    PLAN:  I have recommended a CT-guided biopsy.  If the CT-guided biopsy shows a lipoma, then he would not need any further followup.  If the CT biopsy is indeterminate, then I would recommend followup CT scan in about 6 months.  If the CT scan shows liposarcoma, then I would recommend excision.  We will get a CT-guided needle biopsies performed as soon as possible.    Total time 22 minutes, which included a review of his imaging, in-person visit and coordinating his care.    cc:   Kory Hudson MD   [facility]  [address] [place] [address]     Chen Florence MD  [facility]  [address] [facility] [address] [facility] [address]  [place] [address]

## 2022-03-02 ENCOUNTER — TELEPHONE (OUTPATIENT)
Dept: CT IMAGING | Facility: CLINIC | Age: 42
End: 2022-03-02
Payer: COMMERCIAL

## 2022-03-02 ENCOUNTER — MYC MEDICAL ADVICE (OUTPATIENT)
Dept: FAMILY MEDICINE | Facility: CLINIC | Age: 42
End: 2022-03-02
Payer: COMMERCIAL

## 2022-03-02 DIAGNOSIS — Z11.59 ENCOUNTER FOR SCREENING FOR OTHER VIRAL DISEASES: Primary | ICD-10-CM

## 2022-03-02 DIAGNOSIS — I10 HYPERTENSION GOAL BP (BLOOD PRESSURE) < 140/90: ICD-10-CM

## 2022-03-02 RX ORDER — LISINOPRIL 10 MG/1
10 TABLET ORAL EVERY MORNING
Qty: 90 TABLET | Refills: 0 | COMMUNITY
Start: 2022-03-02 | End: 2022-04-08

## 2022-03-02 NOTE — TELEPHONE ENCOUNTER
Order for abdominal mass biopsy reviewed and approved per Dr. Mathis with CT guidance and conscious sedation. Patient is on no blood thinning medications and has a recent clinic visit that includes heart/lung sounds.

## 2022-03-04 ENCOUNTER — LAB (OUTPATIENT)
Dept: LAB | Facility: CLINIC | Age: 42
End: 2022-03-04
Payer: COMMERCIAL

## 2022-03-04 DIAGNOSIS — Z11.59 ENCOUNTER FOR SCREENING FOR OTHER VIRAL DISEASES: ICD-10-CM

## 2022-03-04 LAB — SARS-COV-2 RNA RESP QL NAA+PROBE: NEGATIVE

## 2022-03-04 PROCEDURE — U0005 INFEC AGEN DETEC AMPLI PROBE: HCPCS

## 2022-03-04 PROCEDURE — U0003 INFECTIOUS AGENT DETECTION BY NUCLEIC ACID (DNA OR RNA); SEVERE ACUTE RESPIRATORY SYNDROME CORONAVIRUS 2 (SARS-COV-2) (CORONAVIRUS DISEASE [COVID-19]), AMPLIFIED PROBE TECHNIQUE, MAKING USE OF HIGH THROUGHPUT TECHNOLOGIES AS DESCRIBED BY CMS-2020-01-R: HCPCS

## 2022-03-08 ENCOUNTER — HOSPITAL ENCOUNTER (OUTPATIENT)
Dept: CT IMAGING | Facility: CLINIC | Age: 42
Discharge: HOME | End: 2022-03-08
Attending: SURGERY | Admitting: SURGERY
Payer: COMMERCIAL

## 2022-03-08 VITALS
HEART RATE: 69 BPM | OXYGEN SATURATION: 94 % | RESPIRATION RATE: 16 BRPM | SYSTOLIC BLOOD PRESSURE: 101 MMHG | DIASTOLIC BLOOD PRESSURE: 75 MMHG

## 2022-03-08 DIAGNOSIS — R19.00 RETROPERITONEAL MASS: ICD-10-CM

## 2022-03-08 LAB — INR PPP: 1.01 (ref 0.85–1.15)

## 2022-03-08 PROCEDURE — 36415 COLL VENOUS BLD VENIPUNCTURE: CPT | Performed by: RADIOLOGY

## 2022-03-08 PROCEDURE — 88368 INSITU HYBRIDIZATION MANUAL: CPT | Mod: 26 | Performed by: MEDICAL GENETICS

## 2022-03-08 PROCEDURE — 250N000011 HC RX IP 250 OP 636: Performed by: RADIOLOGY

## 2022-03-08 PROCEDURE — 250N000009 HC RX 250: Performed by: RADIOLOGY

## 2022-03-08 PROCEDURE — 88307 TISSUE EXAM BY PATHOLOGIST: CPT | Mod: TC | Performed by: SURGERY

## 2022-03-08 PROCEDURE — 272N000221 CT ABDOMEN RETROPERITONEAL BIOPSY

## 2022-03-08 PROCEDURE — 77012 CT SCAN FOR NEEDLE BIOPSY: CPT

## 2022-03-08 PROCEDURE — 85610 PROTHROMBIN TIME: CPT | Performed by: RADIOLOGY

## 2022-03-08 PROCEDURE — 88275 CYTOGENETICS 100-300: CPT | Performed by: SURGERY

## 2022-03-08 RX ORDER — ACETAMINOPHEN 325 MG/1
650 TABLET ORAL
Status: CANCELLED | OUTPATIENT
Start: 2022-03-08

## 2022-03-08 RX ORDER — FENTANYL CITRATE 50 UG/ML
INJECTION, SOLUTION INTRAMUSCULAR; INTRAVENOUS PRN
Status: COMPLETED | OUTPATIENT
Start: 2022-03-08 | End: 2022-03-08

## 2022-03-08 RX ADMIN — MIDAZOLAM 1 MG: 1 INJECTION INTRAMUSCULAR; INTRAVENOUS at 11:15

## 2022-03-08 RX ADMIN — LIDOCAINE HYDROCHLORIDE 5 ML: 10 INJECTION, SOLUTION EPIDURAL; INFILTRATION; INTRACAUDAL; PERINEURAL at 11:17

## 2022-03-08 RX ADMIN — FENTANYL CITRATE 50 MCG: 50 INJECTION, SOLUTION INTRAMUSCULAR; INTRAVENOUS at 11:15

## 2022-03-08 NOTE — PROCEDURES
[facility]    Procedure: Imaging Procedure Note    Date/Time: 3/8/2022 11:45 AM  Performed by: Gustavo Parker MD  Authorized by: Gustavo Parker MD       UNIVERSAL PROTOCOL   Site Marked: Yes  Prior Images Obtained and Reviewed:  Yes  Required items: Required blood products, implants, devices and special equipment available    Patient identity confirmed:  Verbally with patient  Patient was reevaluated immediately before administering moderate or deep sedation or anesthesia  Confirmation Checklist:  Patient's identity using two indicators, relevant allergies, procedure was appropriate and matched the consent or emergent situation and correct equipment/implants were available  Time out: Immediately prior to the procedure a time out was called    Universal Protocol: the Joint Commission Universal Protocol was followed    Preparation: Patient was prepped and draped in usual sterile fashion    ESBL (mL):  5     ANESTHESIA    Anesthesia: Local infiltration  Local Anesthetic:  Lidocaine 1% without epinephrine  Anesthetic Total (mL):  10      SEDATION  Patient Sedated: Yes    Sedation Type:  Moderate (conscious) sedation  Sedation:  Fentanyl and midazolam  Vital signs: Vital signs monitored during sedation    See dictated procedure note for full details.    PROCEDURE  Describe Procedure: Successful left RP lipomatous mass biopsy  Patient Tolerance:  Patient tolerated the procedure well with no immediate complications  Length of time physician/provider present for 1:1 monitoring during sedation: 15

## 2022-03-08 NOTE — PRE-PROCEDURE
GENERAL PRE-PROCEDURE:   Procedure:  RP biopsy  Date/Time:  3/8/2022 11:44 AM    Written consent obtained?: Yes    Risks and benefits: Risks, benefits and alternatives were discussed    Consent given by:  Patient  Patient states understanding of procedure being performed: Yes    Patient's understanding of procedure matches consent: Yes    Procedure consent matches procedure scheduled: Yes    Expected level of sedation:  Moderate  Appropriately NPO:  Yes  Mallampati  :  Grade 2- soft palate, base of uvula, tonsillar pillars, and portion of posterior pharyngeal wall visible  Lungs:  Lungs clear with good breath sounds bilaterally  Heart:  Normal heart sounds and rate  History & Physical reviewed:  History and physical reviewed and no updates needed  Statement of review:  I have reviewed the lab findings, diagnostic data, medications, and the plan for sedation

## 2022-03-08 NOTE — PROGRESS NOTES
Pt here for retroperitoneal abdominal mass biopsy.  Consent and procedure completed by Dr. Santos.  Sedation given.  VSS.  Bandaid applied.  Recovered 1 hour per Rad recommendation. Discharge instructions given.  Discharge with mom no complications noted.Total sedation time 6 minutes.

## 2022-03-09 ENCOUNTER — VIRTUAL VISIT (OUTPATIENT)
Dept: PHARMACY | Facility: CLINIC | Age: 42
End: 2022-03-09
Payer: COMMERCIAL

## 2022-03-09 DIAGNOSIS — J45.20 MILD INTERMITTENT ASTHMA WITHOUT COMPLICATION: ICD-10-CM

## 2022-03-09 DIAGNOSIS — N32.81 OVERACTIVE BLADDER: ICD-10-CM

## 2022-03-09 DIAGNOSIS — Z78.9 TAKES DIETARY SUPPLEMENTS: ICD-10-CM

## 2022-03-09 DIAGNOSIS — G89.29 CHRONIC LOW BACK PAIN WITHOUT SCIATICA, UNSPECIFIED BACK PAIN LATERALITY: ICD-10-CM

## 2022-03-09 DIAGNOSIS — K22.70 BARRETT'S ESOPHAGUS DETERMINED BY BIOPSY: ICD-10-CM

## 2022-03-09 DIAGNOSIS — F32.1 MODERATE MAJOR DEPRESSION (H): ICD-10-CM

## 2022-03-09 DIAGNOSIS — I10 ESSENTIAL HYPERTENSION: ICD-10-CM

## 2022-03-09 DIAGNOSIS — E66.01 MORBID OBESITY (H): Primary | ICD-10-CM

## 2022-03-09 DIAGNOSIS — M54.50 CHRONIC LOW BACK PAIN WITHOUT SCIATICA, UNSPECIFIED BACK PAIN LATERALITY: ICD-10-CM

## 2022-03-09 PROCEDURE — 99607 MTMS BY PHARM ADDL 15 MIN: CPT | Performed by: PHARMACIST

## 2022-03-09 PROCEDURE — 99606 MTMS BY PHARM EST 15 MIN: CPT | Performed by: PHARMACIST

## 2022-03-09 RX ORDER — MIRABEGRON 50 MG/1
1 TABLET, EXTENDED RELEASE ORAL DAILY
COMMUNITY
Start: 2022-02-12

## 2022-03-09 NOTE — PATIENT INSTRUCTIONS
Recommendations from today's MTM visit:                                                      1. Check with your urologist but you should only be taking mirabegron or oxybutynin - not both. Likely plan was to stop oxybutynin when mirabegron was started.     It was great to speak with you today.  I value your experience and would be very thankful for your time with providing feedback on our clinic survey. You may receive a survey via email or text message in the next few days.     To schedule another MTM appointment, please call the clinic directly or you may call the MTM scheduling line at [phone] or toll-free at [phone].     My Clinical Pharmacist's contact information:                                                      Please feel free to contact me with any questions or concerns you have.      Dorina Benz, PharmD  Medication Therapy Management Provider, [facility]

## 2022-03-09 NOTE — PROGRESS NOTES
Medication Therapy Management (MTM) Encounter    ASSESSMENT:                            Medication Adherence/Access: No issues identified - pills set up in blister packs by [facility]    Obesity: He will continue to increase Saxenda dose as prescribed up to 3 mg/day.     Depression:  Following psychiatry.     Hypertension: stable, some high home blood pressure readings but at goal today.     Incontinence: He is on duplicative therapy and should discontinue oxybutynin or Myrbetriq. Likely he is supposed to be off oxybutynin now and they will confirm with urology.     Pain: stable  Patel's: stable  Supplements: stable  Asthma: stable    PLAN:                            1. Check with your urologist but you should only be taking mirabegron or oxybutynin - not both.    Follow-up: Return in about 2 months (around 5/9/2022) for Medication Therapy Management Pharmacist.    SUBJECTIVE/OBJECTIVE:                          Kimo Greenwood is a 41 year old male contacted via secure video for a follow-up visit. Patient was accompanied by mom Dalia and georgette mom. Today's visit is a follow-up MTM visit from 1/25.     Reason for visit: Saxenda follow-up.    Tobacco: He reports that he has quit smoking. His smoking use included vaping device. He has a 1.00 pack-year smoking history. He has never used smokeless tobacco.  Alcohol: not currently using    Medication Adherence/Access: No issues reported - [facility] provides blister packs of medications, foster mom helping to give to Willard now    Obesity: Current medication(s) include: Saxenda 1.2 mg daily (increased dose today, every 7 days increasing by 0.6 mg). No side effects. Previously tolerated Saxenda. Needs weight loss for hip surgery. Try to talk more inside house, twice a day. Watching his diet and portion sizes, drinking Gatorades.   Failed medications include: phentermine (ineffective), bupropion (for depression), topiramate (ineffective).   Weight trend:   360.7 lbs at home  today  Wt Readings from Last 3 Encounters:   02/28/22 (!) 364 lb 6.4 oz (165.3 kg)   02/21/22 (!) 371 lb 6.4 oz (168.5 kg)   08/27/21 (!) 384 lb (174.2 kg)     Patient's comorbidities associated with obesity include: Hypertension, Obstructive Sleep Apnea, Arthritis, GERD and Depression.  Lab Results   Component Value Date    A1C 5.1 08/27/2021    A1C 5.1 12/24/2019    A1C 4.6 03/20/2017    A1C 4.9 01/25/2016    A1C 5.1 08/01/2015    A1C 5.2 05/20/2014       Depression:  Current medications include: olanzapine 5 mg at bedtime (was just reduced by psychiatry from 7.5 mg - will decrease to 5 mg in 1 week once current packs are out), prazosin 3mg bedtime, and duloxetine 120 mg daily. No sleep changes. Follows psychiatrist Traci Pulido at [facility]. Willard reports today that mood and depression is stable.   Past med trials: aripiprazole, fluoxetine, bupropion, sertraline, risperidone, amitriptyline, venlafaxine (all ineffective)  PHQ 6/1/2020 8/27/2021 2/21/2022   PHQ-9 Total Score 8 0 1   Q9: Thoughts of better off dead/self-harm past 2 weeks Several days Not at all Not at all   F/U: Thoughts of suicide or self-harm No - -   F/U: Safety concerns No - -       Hypertension: Current medications include lisinopril 10 mg daily and hydrochlorothiazide 25 mg daily.  Patient does self-monitor blood pressure. Blood pressure's have ranged 161/103, 120/73, 120/86, 148/105 and 131/85 today. Patient reports no current medication side effects.  BP Readings from Last 3 Encounters:   03/08/22 101/75   02/28/22 137/87   02/21/22 134/74       Incontinence: Patient taking oxybutynin ER 30 mg daily and Myrbetriq ER 50 mg daily. Still some leaking during the day, better at night. Not sure if he's supposed to stop oxybutynin or not be on myrbetriq?    Pain: Patient taking naproxen 440 mg twice daily. Lidocaine 5% patches not covered. Does have pain around his hips.     Patel's: Current medications include: Protonix (pantoprazole) 40  mg once daily. Pt reports no current symptoms.  Patient feels that current regimen is effective.    Supplements: Patient taking vitamin D 2000 unis daily and fiber 2 tablets twice daily. No issues reported.     Asthma: Current medications: albuterol MDI (not needing).  Triggers include: Patient is unaware of triggers.  Patient reports the following symptoms: none.  ACT Total Scores 9/18/2020 9/18/2020 8/27/2021   ACT TOTAL SCORE (Goal Greater than or Equal to 20) 24 24 25   In the past 12 months, how many times did you visit the emergency room for your asthma without being admitted to the hospital? 0 0 0   In the past 12 months, how many times were you hospitalized overnight because of your asthma? 0 0 0       Today's Vitals: There were no vitals taken for this visit.  ----------------  Post Discharge Medication Reconciliation Status: discharge medications reconciled and changed, per note/orders.    I spent 20 minutes with this patient today. All changes were made via collaborative practice agreement with Kory Hudson MD. A copy of the visit note was provided to the patient's provider(s).    The patient was sent via [email or web address] a summary of these recommendations.     Dorina Benz, PharmD  Medication Therapy Management Provider, [facility]  Pager: [phone]    Telemedicine Visit Details  Type of service:  Video Conference via [employer]  Start Time: 11:31 AM  End Time: 11:52 AM  Originating Location (patient location): Home  Distant Location (provider location):  [facility]     Medication Therapy Recommendations  Overactive bladder    Current Medication: mirabegron (MYRBETRIQ) 50 MG 24 hr tablet   Rationale: Duplicate Therapy - Unnecessary medication therapy - Indication   Recommendation: Discontinue Medication - oxybutynin ER 15 MG 24 hr tablet   Status: Declined per Provider

## 2022-03-14 ENCOUNTER — TRANSFERRED RECORDS (OUTPATIENT)
Dept: FAMILY MEDICINE | Facility: CLINIC | Age: 42
End: 2022-03-14

## 2022-03-14 ENCOUNTER — OFFICE VISIT (OUTPATIENT)
Dept: FAMILY MEDICINE | Facility: CLINIC | Age: 42
End: 2022-03-14
Payer: COMMERCIAL

## 2022-03-14 VITALS
BODY MASS INDEX: 51.9 KG/M2 | DIASTOLIC BLOOD PRESSURE: 78 MMHG | OXYGEN SATURATION: 100 % | TEMPERATURE: 98.2 F | WEIGHT: 315 LBS | SYSTOLIC BLOOD PRESSURE: 120 MMHG | RESPIRATION RATE: 18 BRPM | HEART RATE: 84 BPM

## 2022-03-14 DIAGNOSIS — E66.01 MORBID OBESITY (H): ICD-10-CM

## 2022-03-14 DIAGNOSIS — I10 ESSENTIAL HYPERTENSION: ICD-10-CM

## 2022-03-14 DIAGNOSIS — G89.29 CHRONIC LOW BACK PAIN WITHOUT SCIATICA, UNSPECIFIED BACK PAIN LATERALITY: ICD-10-CM

## 2022-03-14 DIAGNOSIS — J45.20 MILD INTERMITTENT ASTHMA WITHOUT COMPLICATION: ICD-10-CM

## 2022-03-14 DIAGNOSIS — M54.50 CHRONIC LOW BACK PAIN WITHOUT SCIATICA, UNSPECIFIED BACK PAIN LATERALITY: ICD-10-CM

## 2022-03-14 DIAGNOSIS — F17.200 TOBACCO USE DISORDER: ICD-10-CM

## 2022-03-14 DIAGNOSIS — N39.44 NOCTURNAL ENURESIS: ICD-10-CM

## 2022-03-14 DIAGNOSIS — F70 MILD INTELLECTUAL DISABILITY: ICD-10-CM

## 2022-03-14 DIAGNOSIS — Z00.00 MEDICARE ANNUAL WELLNESS VISIT, SUBSEQUENT: Primary | ICD-10-CM

## 2022-03-14 DIAGNOSIS — G47.33 OSA (OBSTRUCTIVE SLEEP APNEA): ICD-10-CM

## 2022-03-14 DIAGNOSIS — N32.81 OVERACTIVE BLADDER: ICD-10-CM

## 2022-03-14 DIAGNOSIS — F33.1 MODERATE EPISODE OF RECURRENT MAJOR DEPRESSIVE DISORDER (H): ICD-10-CM

## 2022-03-14 DIAGNOSIS — K22.70 BARRETT'S ESOPHAGUS DETERMINED BY BIOPSY: ICD-10-CM

## 2022-03-14 LAB
ANION GAP SERPL CALCULATED.3IONS-SCNC: 5 MMOL/L (ref 3–14)
BASOPHILS # BLD AUTO: 0 10E3/UL (ref 0–0.2)
BASOPHILS NFR BLD AUTO: 0 %
BUN SERPL-MCNC: 21 MG/DL (ref 7–30)
CALCIUM SERPL-MCNC: 9.5 MG/DL (ref 8.5–10.1)
CHLORIDE BLD-SCNC: 102 MMOL/L (ref 94–109)
CO2 SERPL-SCNC: 29 MMOL/L (ref 20–32)
CREAT SERPL-MCNC: 0.9 MG/DL (ref 0.66–1.25)
EOSINOPHIL # BLD AUTO: 0.2 10E3/UL (ref 0–0.7)
EOSINOPHIL NFR BLD AUTO: 1 %
ERYTHROCYTE [DISTWIDTH] IN BLOOD BY AUTOMATED COUNT: 13.5 % (ref 10–15)
GFR SERPL CREATININE-BSD FRML MDRD: >90 ML/MIN/1.73M2
GLUCOSE BLD-MCNC: 99 MG/DL (ref 70–99)
HCT VFR BLD AUTO: 41.2 % (ref 40–53)
HGB BLD-MCNC: 13.7 G/DL (ref 13.3–17.7)
INTERPRETATION: NORMAL
LYMPHOCYTES # BLD AUTO: 2.5 10E3/UL (ref 0.8–5.3)
LYMPHOCYTES NFR BLD AUTO: 22 %
MCH RBC QN AUTO: 28.4 PG (ref 26.5–33)
MCHC RBC AUTO-ENTMCNC: 33.3 G/DL (ref 31.5–36.5)
MCV RBC AUTO: 85 FL (ref 78–100)
MONOCYTES # BLD AUTO: 0.8 10E3/UL (ref 0–1.3)
MONOCYTES NFR BLD AUTO: 7 %
NEUTROPHILS # BLD AUTO: 8.2 10E3/UL (ref 1.6–8.3)
NEUTROPHILS NFR BLD AUTO: 70 %
PATH REPORT.COMMENTS IMP SPEC: NORMAL
PATH REPORT.FINAL DX SPEC: NORMAL
PATH REPORT.GROSS SPEC: NORMAL
PATH REPORT.MICROSCOPIC SPEC OTHER STN: NORMAL
PATH REPORT.RELEVANT HX SPEC: NORMAL
PHOTO IMAGE: NORMAL
PLATELET # BLD AUTO: 287 10E3/UL (ref 150–450)
POTASSIUM BLD-SCNC: 3.8 MMOL/L (ref 3.4–5.3)
RBC # BLD AUTO: 4.83 10E6/UL (ref 4.4–5.9)
SODIUM SERPL-SCNC: 136 MMOL/L (ref 133–144)
WBC # BLD AUTO: 11.7 10E3/UL (ref 4–11)

## 2022-03-14 PROCEDURE — 85025 COMPLETE CBC W/AUTO DIFF WBC: CPT | Performed by: FAMILY MEDICINE

## 2022-03-14 PROCEDURE — 88307 TISSUE EXAM BY PATHOLOGIST: CPT | Mod: 26

## 2022-03-14 PROCEDURE — 99396 PREV VISIT EST AGE 40-64: CPT | Performed by: FAMILY MEDICINE

## 2022-03-14 PROCEDURE — 80048 BASIC METABOLIC PNL TOTAL CA: CPT | Performed by: FAMILY MEDICINE

## 2022-03-14 PROCEDURE — 36415 COLL VENOUS BLD VENIPUNCTURE: CPT | Performed by: FAMILY MEDICINE

## 2022-03-14 ASSESSMENT — ASTHMA QUESTIONNAIRES: ACT_TOTALSCORE: 25

## 2022-03-14 NOTE — PROGRESS NOTES
"SUBJECTIVE:   Kimo Greenwood is a 41 year old male who presents for Preventive Visit.    Patient has been advised of split billing requirements and indicates understanding: Yes  Are you in the first 12 months of your Medicare coverage?      HPI  Do you feel safe in your environment? Yes    Have you ever done Advance Care Planning? (For example, a Health Directive, POLST, or a discussion with a medical provider or your loved ones about your wishes): Yes, advance care planning is on file.     Fall risk  Fallen 2 or more times in the past year?: No  Any fall with injury in the past year?: No  Cognitive Screening   1) Repeat 3 items (Leader, Season, Table)    2) Clock draw: ABNORMAL pt refused  3) 3 item recall: Recalls NO objects   Results: pt refused    Mini-CogTM Copyright S Jimmy. Licensed by the author for use in [facility]; reprinted with permission (kojo@[email or web address]). All rights reserved.      Do you have sleep apnea, excessive snoring or daytime drowsiness?: yes   Pt has sleep apnea    Reviewed and updated as needed this visit by clinical staff   Tobacco  Allergies  Meds   Med Hx  Surg Hx  Fam Hx  Soc Hx        Reviewed and updated as needed this visit by Provider                 Social History     Tobacco Use     Smoking status: Current Every Day Smoker     Packs/day: 1.00     Years: 1.00     Pack years: 1.00     Types: Vaping Device, Cigarettes     Smokeless tobacco: Current User     Types: Chew     Tobacco comment: Smokes E Cigs equal to 1 PPD   Substance Use Topics     Alcohol use: Yes     Comment: socially--\"not very often\" \"once a month\"     Alcohol Use 1/22/2021   Prescreen: >3 drinks/day or >7 drinks/week? No   Prescreen: >3 drinks/day or >7 drinks/week? -   AUDIT SCORE  -     Current providers sharing in care for this patient include:   Patient Care Team:  Kory Hudson MD as PCP - General (Family Practice)  Kory Hudson MD as Assigned PCP  Didier Ibarra MD as MD " "(Urology)  Deana Adrian APRN CNP as Nurse Practitioner  Eliana Concepcion, RN as Personal Advocate & Liaison (PAL)  Yeo, Albert, MD as Assigned Musculoskeletal Provider  Dorina Benz [facility] as Pharmacist (Pharmacist)    The following health maintenance items are reviewed in Epic and correct as of today:  Health Maintenance Due   Topic Date Due     ASTHMA CONTROL TEST  02/27/2022     Still living in [facility] in [place], [place].    History of intermittent asthma, currently controlled.     History of hypertension. Controlled with lisinopril and hydrochlorothiazide.     History of obstructive sleep apnea. Wearing CPAP nightly.     History of overactive bladder with nocturnal enuresis.  Following with urology.  Prior InterStim placement.  Taking Myrbetriq and Ditropan.    History of chronic low back pain.    Patient status post revision left total hip arthroplasty with a head and liner exchange to a biomet duel mobility head and liner on 6/24/2019. Followed by [facility].     History of moderate major depression.  Following with psychiatry currently on duloxetine, Zyprexa, prazosin.     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    History of tobacco use with ongoing smoking.    Morbid obesity with improvement on Saxenda.    Review of Systems  Constitutional, HEENT, cardiovascular, pulmonary, gi and gu systems are negative, except as otherwise noted.    OBJECTIVE:   /78   Pulse 84   Temp 98.2  F (36.8  C) (Oral)   Resp 18   Wt (!) 164.6 kg (362 lb 14.4 oz)   SpO2 100%   BMI 51.90 kg/m   Estimated body mass index is 51.9 kg/m  as calculated from the following:    Height as of 2/28/22: 1.781 m (5' 10.12\").    Weight as of this encounter: 164.6 kg (362 lb 14.4 oz).   Wt Readings from Last 4 Encounters:   03/14/22 (!) 164.6 kg (362 lb 14.4 oz)   02/28/22 (!) 165.3 kg (364 lb 6.4 oz)   02/21/22 (!) 168.5 kg (371 lb 6.4 oz)   08/27/21 (!) " "174.2 kg (384 lb)     Physical Exam  GENERAL: alert, no distress and obese  EYES: Eyes grossly normal to inspection, PERRL and conjunctivae and sclerae normal  HENT: ear canals and TM's normal, nose and mouth without ulcers or lesions  NECK: no adenopathy, no asymmetry, masses, or scars and thyroid normal to palpation  RESP: lungs clear to auscultation - no rales, rhonchi or wheezes  CV: regular rate and rhythm, normal S1 S2, no S3 or S4, no murmur, click or rub, no peripheral edema and peripheral pulses strong  ABDOMEN: soft, nontender, no hepatosplenomegaly, no masses and bowel sounds normal  MS: no gross musculoskeletal defects noted, no edema  SKIN: no suspicious lesions or rashes  NEURO: Normal strength and tone, mentation intact and speech normal  PSYCH: mentation appears normal and affect flat    Diagnostic Test Results:  Labs reviewed in Epic    ASSESSMENT / PLAN:       ICD-10-CM    1. Medicare annual wellness visit, subsequent  Z00.00    2. Mild intermittent asthma without complication  J45.20    3. Morbid obesity (H)  E66.01 Basic metabolic panel  (Ca, Cl, CO2, Creat, Gluc, K, Na, BUN)     CBC with platelets and differential   4. Moderate episode of recurrent major depressive disorder (H)  F33.1    5. Overactive bladder  N32.81    6. LOREN (obstructive sleep apnea)  G47.33    7. Nocturnal enuresis  N39.44    8. Mild intellectual disability  F70    9. Essential hypertension  I10 Basic metabolic panel  (Ca, Cl, CO2, Creat, Gluc, K, Na, BUN)     CBC with platelets and differential   10. Chronic low back pain without sciatica, unspecified back pain laterality  M54.50     G89.29    11. 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 Tobacco use disorder  F17.200        COUNSELING:  Reviewed preventive health counseling, as reflected in patient instructions    Estimated body mass index is 51.9 kg/m  as calculated from the following:    Height as of 2/28/22: 1.781 m (5' 10.12\").    Weight as of this " encounter: 164.6 kg (362 lb 14.4 oz).    Weight management plan: Discussed healthy diet and exercise guidelines    He reports that he has been smoking vaping device and cigarettes. He has a 1.00 pack-year smoking history. His smokeless tobacco use includes chew.      Appropriate preventive services were discussed with this patient, including applicable screening as appropriate for cardiovascular disease, diabetes, osteopenia/osteoporosis, and glaucoma.  As appropriate for age/gender, discussed screening for colorectal cancer, prostate cancer, breast cancer, and cervical cancer. Checklist reviewing preventive services available has been given to the patient.    Reviewed patients plan of care and provided an AVS. The Intermediate Care Plan ( asthma action plan, low back pain action plan, and migraine action plan) for Kimo meets the Care Plan requirement. This Care Plan has been established and reviewed with the Patient and mother.    Counseling Resources:  ATP IV Guidelines  Pooled Cohorts Equation Calculator  Breast Cancer Risk Calculator  Breast Cancer: Medication to Reduce Risk  FRAX Risk Assessment  ICSI Preventive Guidelines  Dietary Guidelines for Americans, 2010  USDA's MyPlate  ASA Prophylaxis  Lung CA Screening    Kory Hudson MD  [facility]    Identified Health Risks:

## 2022-03-23 ENCOUNTER — HOSPITAL ENCOUNTER (OUTPATIENT)
Dept: CT IMAGING | Facility: CLINIC | Age: 42
Discharge: HOME | End: 2022-03-23
Attending: FAMILY MEDICINE | Admitting: FAMILY MEDICINE
Payer: COMMERCIAL

## 2022-03-23 ENCOUNTER — HOSPITAL ENCOUNTER (OUTPATIENT)
Dept: GENERAL RADIOLOGY | Facility: CLINIC | Age: 42
Discharge: HOME | End: 2022-03-23
Attending: ORTHOPAEDIC SURGERY | Admitting: ORTHOPAEDIC SURGERY
Payer: COMMERCIAL

## 2022-03-23 VITALS — HEART RATE: 75 BPM | SYSTOLIC BLOOD PRESSURE: 138 MMHG | DIASTOLIC BLOOD PRESSURE: 81 MMHG | OXYGEN SATURATION: 97 %

## 2022-03-23 DIAGNOSIS — M16.9 HIP OSTEOARTHRITIS: ICD-10-CM

## 2022-03-23 DIAGNOSIS — J18.9 PNEUMONIA OF RIGHT LOWER LOBE DUE TO INFECTIOUS ORGANISM: ICD-10-CM

## 2022-03-23 DIAGNOSIS — J45.20 MILD INTERMITTENT ASTHMA WITHOUT COMPLICATION: ICD-10-CM

## 2022-03-23 DIAGNOSIS — E66.01 MORBID OBESITY (H): ICD-10-CM

## 2022-03-23 PROCEDURE — 250N000011 HC RX IP 250 OP 636: Performed by: PHYSICIAN ASSISTANT

## 2022-03-23 PROCEDURE — 20610 DRAIN/INJ JOINT/BURSA W/O US: CPT | Mod: RT

## 2022-03-23 PROCEDURE — 255N000002 HC RX 255 OP 636: Performed by: PHYSICIAN ASSISTANT

## 2022-03-23 PROCEDURE — 71250 CT THORAX DX C-: CPT

## 2022-03-23 PROCEDURE — 250N000009 HC RX 250: Performed by: PHYSICIAN ASSISTANT

## 2022-03-23 RX ORDER — LIDOCAINE HYDROCHLORIDE 10 MG/ML
5 INJECTION, SOLUTION EPIDURAL; INFILTRATION; INTRACAUDAL; PERINEURAL ONCE
Status: COMPLETED | OUTPATIENT
Start: 2022-03-23 | End: 2022-03-23

## 2022-03-23 RX ORDER — ALBUTEROL SULFATE 90 UG/1
AEROSOL, METERED RESPIRATORY (INHALATION)
Qty: 18 G | Refills: 1 | Status: SHIPPED | OUTPATIENT
Start: 2022-03-23 | End: 2023-09-01

## 2022-03-23 RX ORDER — BUPIVACAINE HYDROCHLORIDE 5 MG/ML
4 INJECTION, SOLUTION EPIDURAL; INTRACAUDAL ONCE
Status: COMPLETED | OUTPATIENT
Start: 2022-03-23 | End: 2022-03-23

## 2022-03-23 RX ORDER — TRIAMCINOLONE ACETONIDE 40 MG/ML
40 INJECTION, SUSPENSION INTRA-ARTICULAR; INTRAMUSCULAR ONCE
Status: COMPLETED | OUTPATIENT
Start: 2022-03-23 | End: 2022-03-23

## 2022-03-23 RX ORDER — TRIAMCINOLONE ACETONIDE 40 MG/ML
INJECTION, SUSPENSION INTRA-ARTICULAR; INTRAMUSCULAR
Status: DISCONTINUED
Start: 2022-03-23 | End: 2022-03-24 | Stop reason: HOSPADM

## 2022-03-23 RX ORDER — BUPIVACAINE HYDROCHLORIDE 5 MG/ML
INJECTION, SOLUTION EPIDURAL; INTRACAUDAL
Status: DISCONTINUED
Start: 2022-03-23 | End: 2022-03-24 | Stop reason: HOSPADM

## 2022-03-23 RX ORDER — LIRAGLUTIDE 6 MG/ML
INJECTION, SOLUTION SUBCUTANEOUS
Qty: 27 ML | Refills: 1 | Status: ON HOLD | OUTPATIENT
Start: 2022-03-23 | End: 2022-05-07

## 2022-03-23 RX ORDER — LIDOCAINE HYDROCHLORIDE 10 MG/ML
INJECTION, SOLUTION EPIDURAL; INFILTRATION; INTRACAUDAL; PERINEURAL
Status: DISCONTINUED
Start: 2022-03-23 | End: 2022-03-24 | Stop reason: HOSPADM

## 2022-03-23 RX ORDER — IOPAMIDOL 408 MG/ML
10 INJECTION, SOLUTION INTRATHECAL ONCE
Status: COMPLETED | OUTPATIENT
Start: 2022-03-23 | End: 2022-03-23

## 2022-03-23 RX ADMIN — TRIAMCINOLONE ACETONIDE 40 MG: 40 INJECTION, SUSPENSION INTRA-ARTICULAR; INTRAMUSCULAR at 14:23

## 2022-03-23 RX ADMIN — IOPAMIDOL 1.5 ML: 408 INJECTION, SOLUTION INTRATHECAL at 14:24

## 2022-03-23 RX ADMIN — BUPIVACAINE HYDROCHLORIDE 4 ML: 5 INJECTION, SOLUTION EPIDURAL; INTRACAUDAL; PERINEURAL at 14:22

## 2022-03-23 RX ADMIN — LIDOCAINE HYDROCHLORIDE 5 ML: 10 INJECTION, SOLUTION EPIDURAL; INFILTRATION; INTRACAUDAL; PERINEURAL at 14:23

## 2022-03-23 NOTE — PROGRESS NOTES
RADIOLOGY PROCEDURE NOTE  Patient name: Kimo Greenwood  MRN: [id number]  : 1980    Pre-procedure diagnosis: Right hip pain  Post-procedure diagnosis: Same    Procedure Date/Time: 2022  2:23 PM  Procedure: Right hip joint steroid injection  Estimated blood loss: None  Specimen(s) collected with description: none  The patient tolerated the procedure well with no immediate complications.  Significant findings:none    See imaging dictation for procedural details.    Provider name: Alberto Rodriguez PA-C  Assistant(s):None

## 2022-03-23 NOTE — TELEPHONE ENCOUNTER
Pharmacy calling to request    Patient changed to this pharmacy vs [facility].      Please advise    Ivanna Ashby

## 2022-03-23 NOTE — PROGRESS NOTES
Pt was in Radiology today for a right hip joint steroid injection. Pt tolerated ortho procedure well. Pre procedure pain was 6/10 post procedure pain level 0. Procedure was completed by TERRANCE EDWARDS. There were no complications during this procedure. Pt verbalized understanding of written and verbal instructions and left department in stable and satisfactory condition with family. There is no evidence of bleeding or any other complications upon discharge.

## 2022-03-25 ENCOUNTER — VIRTUAL VISIT (OUTPATIENT)
Dept: ONCOLOGY | Facility: CLINIC | Age: 42
End: 2022-03-25
Attending: SURGERY
Payer: COMMERCIAL

## 2022-03-25 DIAGNOSIS — R19.00 RETROPERITONEAL MASS: Primary | ICD-10-CM

## 2022-03-25 PROCEDURE — 99207 PR NO BILLABLE SERVICE THIS VISIT: CPT | Mod: 95 | Performed by: SURGERY

## 2022-03-25 NOTE — PROGRESS NOTES
Today I had a followup video visit with Willard Greenwood to discuss his biopsy of his retroperitoneal mass.  The biopsy demonstrated a lipomatous mass.  MDM2 was not amplified.  I talked to Willard and his mother today about finding and told him he still could have a low-grade liposarcoma, that has not been excluded.  The options for managing this would be to remove this or to have short-term followup.  He has elected short-term followup, so we will obtain a CT scan in 6 months from his last CT scan, which would be in August.  I will see him either in-person or video then.  If there is no change in size, then I repeat the CT in a year.  If there is no change in size, then we will probably stop following him unless he gets symptoms.    Total time of the video visit was 4 minutes.  Total overall time was 10 minutes.

## 2022-03-25 NOTE — LETTER
3/25/2022     RE: Kimo Greenwood  [address] [facility] [address]    Dear Colleague,    Thank you for referring your patient, Kimo Greenwood, to the [facility]. Please see a copy of my visit note below.    Willard is a 41 year old who is being evaluated via a billable video visit.      Today I had a followup video visit with Willard Greenwood to discuss his biopsy of his retroperitoneal mass.  The biopsy demonstrated a lipomatous mass.  MDM2 was not amplified.  I talked to Willard and his mother today about finding and told him he still could have a low-grade liposarcoma, that has not been excluded.  The options for managing this would be to remove this or to have short-term followup.  He has elected short-term followup, so we will obtain a CT scan in 6 months from his last CT scan, which would be in August.  I will see him either in-person or video then.  If there is no change in size, then I repeat the CT in a year.  If there is no change in size, then we will probably stop following him unless he gets symptoms.    Total time of the video visit was 4 minutes.  Total overall time was 10 minutes.    Again, thank you for allowing me to participate in the care of your patient.      Sincerely,    Star Ojeda MD

## 2022-03-30 ENCOUNTER — PATIENT OUTREACH (OUTPATIENT)
Dept: ONCOLOGY | Facility: CLINIC | Age: 42
End: 2022-03-30
Payer: COMMERCIAL

## 2022-03-30 ENCOUNTER — TRANSCRIBE ORDERS (OUTPATIENT)
Dept: OTHER | Age: 42
End: 2022-03-30
Payer: COMMERCIAL

## 2022-03-30 DIAGNOSIS — R16.1 SPLEEN ENLARGED: ICD-10-CM

## 2022-03-30 DIAGNOSIS — D72.829 ELEVATED WHITE BLOOD CELL COUNT: Primary | ICD-10-CM

## 2022-03-30 NOTE — PROGRESS NOTES
RECORDS STATUS - ALL OTHER DIAGNOSIS      RECORDS RECEIVED FROM: Epic/ [place] (images are in PACS)    DATE RECEIVED: 5/25/2022   NOTES STATUS DETAILS   OFFICE NOTE from referring provider     OFFICE NOTE from medical oncologist Complete 3/25/2022    Retroperitoneal mass    More in EPIC   DISCHARGE SUMMARY from hospital     DISCHARGE REPORT from the ER     MEDICATION LIST Complete [facility]   CLINICAL TRIAL TREATMENTS TO DATE     LABS     PATHOLOGY REPORTS Complete 3/8/2022   Retroperitoneal mass, CT-guided needle biopsy:  -Mature adipose tissue, consistent with lipomatous tumor.   ANYTHING RELATED TO DIAGNOSIS Complete Labs last updated on 3/14/2022   GENONOMIC TESTING     TYPE:     IMAGING (NEED IMAGES & REPORT)     CT SCANS Scheduled CT Abdomen 3/8/2022    CT abdomen 2/19/2022   MRI     MAMMO     ULTRASOUND     PET       Action    Action Taken 3/30/2022 1:59PM KEBENJAMIN     I called pt Willard - I spoke to his legal guardian/ mother Belen - all records are internal.

## 2022-03-30 NOTE — PROGRESS NOTES
Writer received referral for splenomegaly and leukocytosis. Reviewed for urgency based on labs and symptomology. Appropriate scheduling instructions added and referral sent to New Patient Scheduling for completion.

## 2022-04-11 ENCOUNTER — TRANSFERRED RECORDS (OUTPATIENT)
Dept: HEALTH INFORMATION MANAGEMENT | Facility: CLINIC | Age: 42
End: 2022-04-11
Payer: COMMERCIAL

## 2022-04-16 ENCOUNTER — APPOINTMENT (OUTPATIENT)
Dept: GENERAL RADIOLOGY | Facility: CLINIC | Age: 42
End: 2022-04-16
Attending: EMERGENCY MEDICINE
Payer: COMMERCIAL

## 2022-04-16 ENCOUNTER — HOSPITAL ENCOUNTER (EMERGENCY)
Facility: CLINIC | Age: 42
Discharge: HOME | End: 2022-04-17
Attending: EMERGENCY MEDICINE | Admitting: EMERGENCY MEDICINE
Payer: COMMERCIAL

## 2022-04-16 DIAGNOSIS — R07.89 ATYPICAL CHEST PAIN: ICD-10-CM

## 2022-04-16 LAB
ANION GAP SERPL CALCULATED.3IONS-SCNC: 9 MMOL/L (ref 3–14)
BASOPHILS # BLD AUTO: 0.1 10E3/UL (ref 0–0.2)
BASOPHILS NFR BLD AUTO: 1 %
BUN SERPL-MCNC: 12 MG/DL (ref 7–30)
CALCIUM SERPL-MCNC: 8.9 MG/DL (ref 8.5–10.1)
CHLORIDE BLD-SCNC: 102 MMOL/L (ref 94–109)
CO2 SERPL-SCNC: 28 MMOL/L (ref 20–32)
CREAT SERPL-MCNC: 0.87 MG/DL (ref 0.66–1.25)
EOSINOPHIL # BLD AUTO: 0.2 10E3/UL (ref 0–0.7)
EOSINOPHIL NFR BLD AUTO: 1 %
ERYTHROCYTE [DISTWIDTH] IN BLOOD BY AUTOMATED COUNT: 13 % (ref 10–15)
FLUAV RNA SPEC QL NAA+PROBE: NEGATIVE
FLUBV RNA RESP QL NAA+PROBE: NEGATIVE
GFR SERPL CREATININE-BSD FRML MDRD: >90 ML/MIN/1.73M2
GLUCOSE BLD-MCNC: 115 MG/DL (ref 70–99)
HCT VFR BLD AUTO: 38.3 % (ref 40–53)
HGB BLD-MCNC: 12.9 G/DL (ref 13.3–17.7)
HOLD SPECIMEN: NORMAL
HOLD SPECIMEN: NORMAL
IMM GRANULOCYTES # BLD: 0 10E3/UL
IMM GRANULOCYTES NFR BLD: 0 %
LYMPHOCYTES # BLD AUTO: 2.4 10E3/UL (ref 0.8–5.3)
LYMPHOCYTES NFR BLD AUTO: 18 %
MCH RBC QN AUTO: 28.7 PG (ref 26.5–33)
MCHC RBC AUTO-ENTMCNC: 33.7 G/DL (ref 31.5–36.5)
MCV RBC AUTO: 85 FL (ref 78–100)
MONOCYTES # BLD AUTO: 0.7 10E3/UL (ref 0–1.3)
MONOCYTES NFR BLD AUTO: 6 %
NEUTROPHILS # BLD AUTO: 9.7 10E3/UL (ref 1.6–8.3)
NEUTROPHILS NFR BLD AUTO: 74 %
NRBC # BLD AUTO: 0 10E3/UL
NRBC BLD AUTO-RTO: 0 /100
PLATELET # BLD AUTO: 261 10E3/UL (ref 150–450)
POTASSIUM BLD-SCNC: 3.4 MMOL/L (ref 3.4–5.3)
RBC # BLD AUTO: 4.49 10E6/UL (ref 4.4–5.9)
RSV RNA SPEC NAA+PROBE: NEGATIVE
SARS-COV-2 RNA RESP QL NAA+PROBE: NEGATIVE
SODIUM SERPL-SCNC: 139 MMOL/L (ref 133–144)
TROPONIN I SERPL HS-MCNC: 7 NG/L
WBC # BLD AUTO: 13.1 10E3/UL (ref 4–11)

## 2022-04-16 PROCEDURE — 85025 COMPLETE CBC W/AUTO DIFF WBC: CPT | Performed by: EMERGENCY MEDICINE

## 2022-04-16 PROCEDURE — 87637 SARSCOV2&INF A&B&RSV AMP PRB: CPT | Performed by: EMERGENCY MEDICINE

## 2022-04-16 PROCEDURE — 36415 COLL VENOUS BLD VENIPUNCTURE: CPT | Performed by: EMERGENCY MEDICINE

## 2022-04-16 PROCEDURE — 99285 EMERGENCY DEPT VISIT HI MDM: CPT | Mod: 25

## 2022-04-16 PROCEDURE — 93005 ELECTROCARDIOGRAM TRACING: CPT

## 2022-04-16 PROCEDURE — 80048 BASIC METABOLIC PNL TOTAL CA: CPT | Performed by: EMERGENCY MEDICINE

## 2022-04-16 PROCEDURE — 71046 X-RAY EXAM CHEST 2 VIEWS: CPT

## 2022-04-16 PROCEDURE — 84484 ASSAY OF TROPONIN QUANT: CPT | Performed by: EMERGENCY MEDICINE

## 2022-04-16 PROCEDURE — C9803 HOPD COVID-19 SPEC COLLECT: HCPCS

## 2022-04-17 VITALS
DIASTOLIC BLOOD PRESSURE: 71 MMHG | SYSTOLIC BLOOD PRESSURE: 119 MMHG | RESPIRATION RATE: 18 BRPM | OXYGEN SATURATION: 99 % | TEMPERATURE: 98.6 F | HEART RATE: 100 BPM

## 2022-04-17 LAB — TROPONIN I SERPL HS-MCNC: 7 NG/L

## 2022-04-17 NOTE — ED TRIAGE NOTES
Patient arrives via EMS from [facility] for evaluation of CP. Patient reports sudden onset of CP and nausea approximately 45 min ago. Reports intermittent chills for past 24 hours. Patient received 324 of aspirin and 2 nitroglycerin en route. Pain from 5/10 to 1/10 following interventions.

## 2022-04-17 NOTE — ED PROVIDER NOTES
History     Chief Complaint:  Chest Pain       HPI   Kimo Greenwood is a 41 year old male who presents with an episode of right upper chest pain that began after eating dinner this evening.  Denies shortness of breath.  Patient was given aspirin and 2 nitro tablets by EMS on route patient reports pain is resolved currently.  He reports increased cough over the last couple of days no sore throat, no shortness of breath but does report a couple episodes of the shakes today.  Nausea but no vomiting.  Lives in a group home.    ROS:  Review of Systems   All other systems reviewed and are negative.         Allergies:  Other [No Clinical Screening - See Comments]     Medications:    albuterol (VENTOLIN HFA) 108 (90 Base) MCG/ACT inhaler  liraglutide - Weight Management (SAXENDA) 18 MG/3ML pen  DULoxetine (CYMBALTA) 60 MG capsule  FIBER- MG tablet  hydrochlorothiazide (HYDRODIURIL) 25 MG tablet  Lidocaine (LIDOCARE) 4 % Patch  lisinopril (ZESTRIL) 10 MG tablet  mirabegron (MYRBETRIQ) 50 MG 24 hr tablet  naproxen sodium 220 MG capsule  OLANZapine (ZYPREXA) 5 MG tablet  order for DME  oxybutynin ER (DITROPAN XL) 15 MG 24 hr tablet  pantoprazole (PROTONIX) 40 MG EC tablet  prazosin (MINIPRESS) 1 MG capsule  VITAMIN D3 50 MCG (2000 UT) tablet        Past Medical History:    Past Medical History:   Diagnosis Date     Arthritis      Asthma      Asthma      Patel's esophagus      Chronic pain      Chronic pain - left hip/leg pain      Gastroesophageal reflux disease      History of anesthesia complications      Hypertension      Hypertension      Leukocytosis      LPRD (laryngopharyngeal reflux disease)      Marijuana abuse      Marijuana abuse      MDD (major depressive disorder)      MDD (major depressive disorder)      Mental retardation, mild (I.Q. 50-70)      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     Obesity 11/11/2010     LOREN (obstructive sleep apnea)      LOREN (obstructive sleep apnea)      Seborrheic  dermatitis      Seborrheic dermatitis      Sleep apnea      Patient Active Problem List   Diagnosis     Speech/language delay     Tobacco use disorder     Nocturnal enuresis     Moderate major depression (H)     LOREN (obstructive sleep apnea)     Essential hypertension     Morbid obesity (H)     Leukocytosis     Suicidal ideation     Chronic low back pain     Bilateral low back pain with left-sided sciatica     History of colonic polyps     Mild intellectual disability     S/P total hip arthroplasty     Vitamin D deficiency     LPRD (laryngopharyngeal reflux disease)     Patel's esophagus determined by biopsy     Mild intermittent asthma     Somatic dysfunction of lumbar region     Somatic dysfunction of sacral region     Sacral pain     Thoracic segment dysfunction     Hip pain, left     Duodenitis     Overactive bladder     Venous stasis dermatitis of both lower extremities     Severe recurrent major depressive disorder with psychotic features (H)     Alcohol use disorder, mild, abuse     Cannabis use disorder, mild, abuse        Past Surgical History:    Past Surgical History:   Procedure Laterality Date     ARTHROPLASTY HIP Left 1/25/2017    Procedure: ARTHROPLASTY HIP;  Surgeon: Bala Randall MD;  Location:  OR     ARTHROPLASTY HIP Left      ARTHROPLASTY REVISION HIP Left 6/24/2019    Procedure: Revision left total hip arthroplasty with a head and liner exchange to a Biomet dual mobility head and liner.;  Surgeon: Bala Randall MD;  Location:  OR     BLADDER SURGERY      [facility]     CLOSED REDUCTION HIP Left 6/10/2019    Procedure: Closed reduction under general anesthesia left recurrent prosthetic hip dislocation;  Surgeon: Rafat Cazares MD;  Location:  OR     COLONOSCOPY N/A 10/30/2015    Procedure: COMBINED COLONOSCOPY, SINGLE OR MULTIPLE BIOPSY/POLYPECTOMY BY BIOPSY;  Surgeon: Alexis Jackson MD;  Location:  GI      COLONOSCOPY N/A 11/17/2016    Procedure: COMBINED COLONOSCOPY, SINGLE OR MULTIPLE BIOPSY/POLYPECTOMY BY BIOPSY;  Surgeon: Cat Huber MD;  [facility]: [facility] GI     COLONOSCOPY N/A 10/28/2020    Procedure: COLONOSCOPY;  Surgeon: You Kraus MD;  Location: [facility] OR     COLONOSCOPY       ESOPHAGOSCOPY, GASTROSCOPY, DUODENOSCOPY (EGD), COMBINED N/A 11/17/2016    Procedure: COMBINED ESOPHAGOSCOPY, GASTROSCOPY, DUODENOSCOPY (EGD), BIOPSY SINGLE OR MULTIPLE;  Surgeon: Cat Huber MD;  Location: [facility] GI     ESOPHAGOSCOPY, GASTROSCOPY, DUODENOSCOPY (EGD), COMBINED N/A 3/12/2020    Procedure: ESOPHAGOGASTRODUODENOSCOPY WITH BIOPSIES;  Surgeon: You Kraus MD;  Location: [facility] OR     ESOPHAGOSCOPY, GASTROSCOPY, DUODENOSCOPY (EGD), COMBINED N/A 10/28/2020    Procedure: ESOPHAGOGASTRODUODENOSCOPY, WITH BIOPSY;  Surgeon: You Kraus MD;  Location: [facility] OR     ESOPHAGOSCOPY, GASTROSCOPY, DUODENOSCOPY (EGD), COMBINED       EXAM UNDER ANESTHESIA, FULGURATE CONDYLOMA ANUS, COMBINED N/A 12/22/2016    Procedure: COMBINED EXAM UNDER ANESTHESIA, FULGURATE CONDYLOMA ANUS;  Surgeon: Nya Cabello MD;  Location: [facility] OR     GENITOURINARY SURGERY       JOINT REPLACEMENT Left 2017    MARGO, Revision Left MARGO     OTHER SURGICAL HISTORY      interstim stimulator implant     WI CYSTOURETHROSCOPY INJ CHEMODENERVATION BLADDER N/A 1/16/2019    Procedure: CYSTOSCOPY BOTOX BLADDER INJECTIONS (100 UNITS IN 10CC);  Surgeon: Didier Ibarra MD;  Location: [facility] OR;  Service: Urology     WI IMPLANT PERIPH/GASTRIC NEUROSTIM/ N/A 1/8/2020    Procedure: NEW INTERSTIM LEAD, REPLACE OLD; NEW INTERSTIM BATTERY, REPLACE OLD;  Surgeon: Didier Ibarra MD;  Location: [facility] OR;  Service: Urology        Family History:    family history includes Anxiety Disorder in his mother; Breast Cancer in his maternal aunt and maternal grandmother; Cancer in his maternal grandfather; Cerebrovascular  Disease in his maternal grandmother; Depression in his mother; Ulcerative Colitis (age of onset: 18) in his mother.    Social History:   reports that he has been smoking vaping device and cigarettes. He has a 1.00 pack-year smoking history. His smokeless tobacco use includes chew. He reports current alcohol use. He reports that he does not use drugs.  PCP: Kory Hudson Ray     Physical Exam     Patient Vitals for the past 24 hrs:   BP Temp Temp src Pulse Resp SpO2   04/17/22 0015 -- -- -- 100 19 97 %   04/17/22 0000 119/71 -- -- 98 14 94 %   04/16/22 2345 -- -- -- 99 27 97 %   04/16/22 2330 119/65 -- -- 96 8 99 %   04/16/22 2300 113/68 -- -- 93 8 98 %   04/16/22 2245 -- -- -- 105 11 98 %   04/16/22 2230 124/63 -- -- 105 14 98 %   04/16/22 2215 -- -- -- 105 9 95 %   04/16/22 2200 122/64 -- -- 104 12 94 %   04/16/22 2145 -- -- -- 108 9 94 %   04/16/22 2140 124/75 98.6  F (37  C) Oral 107 18 96 %        Physical Exam  Gen: well appearing, in no acute distress  Oral : Mucous membranes moist,   Nose: No rhinorhea  Ears: External near normal, without drainage  Eyes: periorbital tissues and sclera normal   Neck: supple, no abnormal swelling  Lungs:  CTAB,  no resp distress, speaks full sentences  CV: Regular rate, regular rhythm  Abd: soft, nontender, nondistended, no rebound/guarding  Ext: no lower extremity edema  Skin: warm, dry, well perfused, no rashes/bruising/lesions on exposed skin  Neuro: alert, no gross motor or sensory deficits,   Psych: pleasant mood, normal affect      Emergency Department Course   ECG:  Sinus rate 105, , QTc 454.  No ST segment changes or T inversions concerning for acute ischemia.      Imaging:  Chest XR,  PA & LAT   Final Result   IMPRESSION: Negative chest.         Report per radiology    Laboratory:  Labs Ordered and Resulted from Time of ED Arrival to Time of ED Departure   BASIC METABOLIC PANEL - Abnormal       Result Value    Sodium 139      Potassium 3.4      Chloride 102       Carbon Dioxide (CO2) 28      Anion Gap 9      Urea Nitrogen 12      Creatinine 0.87      Calcium 8.9      Glucose 115 (*)     GFR Estimate >90     CBC WITH PLATELETS AND DIFFERENTIAL - Abnormal    WBC Count 13.1 (*)     RBC Count 4.49      Hemoglobin 12.9 (*)     Hematocrit 38.3 (*)     MCV 85      MCH 28.7      MCHC 33.7      RDW 13.0      Platelet Count 261      % Neutrophils 74      % Lymphocytes 18      % Monocytes 6      % Eosinophils 1      % Basophils 1      % Immature Granulocytes 0      NRBCs per 100 WBC 0      Absolute Neutrophils 9.7 (*)     Absolute Lymphocytes 2.4      Absolute Monocytes 0.7      Absolute Eosinophils 0.2      Absolute Basophils 0.1      Absolute Immature Granulocytes 0.0      Absolute NRBCs 0.0     TROPONIN I - Normal    Troponin I High Sensitivity 7     INFLUENZA A/B & SARS-COV2 PCR MULTIPLEX - Normal    Influenza A PCR Negative      Influenza B PCR Negative      RSV PCR Negative      SARS CoV2 PCR Negative     TROPONIN I - Normal    Troponin I High Sensitivity 7          Procedures       Emergency Department Course:             Reviewed:  I reviewed nursing notes and vitals    Assessments:      Consults:       Interventions:  Medications - No data to display     Disposition:  The patient was discharged to home.     Impression & Plan    CMS Diagnoses:   and None          Medical Decision Making:  Patient presents emergency department with an episode of chest pain.  Mostly resolved by the time he arrived in the ED no recurrence.  Patient did report a mild cough and some chills earlier today.  His EKG shows no acute ischemic changes he had 2 - troponins.  Given his risk factor profile and presentation I do think outpatient management is appropriate.  Will discuss follow-up with primary care provider I do not think he needs admission for provocative testing at this point.  He has some symptoms of viral etiology, but no evidence of myocarditis, pericarditis ACS, PE or dissection on his  work-up today.  Patient and family are comfortable with discharge plan.        Diagnosis:    ICD-10-CM    1. Atypical chest pain  R07.89         Discharge Medications:  New Prescriptions    No medications on file        4/16/2022   Rafat Faustin,*        Rafat Faustin MD  04/17/22 0028

## 2022-04-18 ENCOUNTER — HOSPITAL ENCOUNTER (EMERGENCY)
Facility: CLINIC | Age: 42
Discharge: HOME | End: 2022-04-18
Attending: EMERGENCY MEDICINE | Admitting: EMERGENCY MEDICINE
Payer: COMMERCIAL

## 2022-04-18 VITALS
WEIGHT: 315 LBS | RESPIRATION RATE: 18 BRPM | HEART RATE: 80 BPM | TEMPERATURE: 98.1 F | SYSTOLIC BLOOD PRESSURE: 151 MMHG | OXYGEN SATURATION: 98 % | BODY MASS INDEX: 51.48 KG/M2 | DIASTOLIC BLOOD PRESSURE: 74 MMHG

## 2022-04-18 DIAGNOSIS — R07.9 CHEST PAIN, UNSPECIFIED TYPE: ICD-10-CM

## 2022-04-18 LAB
ALBUMIN SERPL-MCNC: 3.8 G/DL (ref 3.4–5)
ALP SERPL-CCNC: 110 U/L (ref 40–150)
ALT SERPL W P-5'-P-CCNC: 23 U/L (ref 0–70)
ANION GAP SERPL CALCULATED.3IONS-SCNC: 7 MMOL/L (ref 3–14)
AST SERPL W P-5'-P-CCNC: 13 U/L (ref 0–45)
ATRIAL RATE - MUSE: 105 BPM
BILIRUB SERPL-MCNC: 0.3 MG/DL (ref 0.2–1.3)
BUN SERPL-MCNC: 10 MG/DL (ref 7–30)
CALCIUM SERPL-MCNC: 9.2 MG/DL (ref 8.5–10.1)
CHLORIDE BLD-SCNC: 101 MMOL/L (ref 94–109)
CO2 SERPL-SCNC: 28 MMOL/L (ref 20–32)
CREAT SERPL-MCNC: 0.77 MG/DL (ref 0.66–1.25)
DIASTOLIC BLOOD PRESSURE - MUSE: NORMAL MMHG
GFR SERPL CREATININE-BSD FRML MDRD: >90 ML/MIN/1.73M2
GLUCOSE BLD-MCNC: 110 MG/DL (ref 70–99)
HOLD SPECIMEN: NORMAL
INTERPRETATION ECG - MUSE: NORMAL
LIPASE SERPL-CCNC: 99 U/L (ref 73–393)
P AXIS - MUSE: 33 DEGREES
POTASSIUM BLD-SCNC: 3.4 MMOL/L (ref 3.4–5.3)
PR INTERVAL - MUSE: 204 MS
PROT SERPL-MCNC: 7.1 G/DL (ref 6.8–8.8)
QRS DURATION - MUSE: 100 MS
QT - MUSE: 344 MS
QTC - MUSE: 454 MS
R AXIS - MUSE: -16 DEGREES
SODIUM SERPL-SCNC: 136 MMOL/L (ref 133–144)
SYSTOLIC BLOOD PRESSURE - MUSE: NORMAL MMHG
T AXIS - MUSE: 55 DEGREES
TROPONIN I SERPL HS-MCNC: 7 NG/L
VENTRICULAR RATE- MUSE: 105 BPM

## 2022-04-18 PROCEDURE — 80053 COMPREHEN METABOLIC PANEL: CPT | Performed by: EMERGENCY MEDICINE

## 2022-04-18 PROCEDURE — 99284 EMERGENCY DEPT VISIT MOD MDM: CPT | Mod: 25

## 2022-04-18 PROCEDURE — 96374 THER/PROPH/DIAG INJ IV PUSH: CPT

## 2022-04-18 PROCEDURE — 84484 ASSAY OF TROPONIN QUANT: CPT | Performed by: EMERGENCY MEDICINE

## 2022-04-18 PROCEDURE — 250N000011 HC RX IP 250 OP 636: Performed by: EMERGENCY MEDICINE

## 2022-04-18 PROCEDURE — 93005 ELECTROCARDIOGRAM TRACING: CPT

## 2022-04-18 PROCEDURE — 83690 ASSAY OF LIPASE: CPT | Performed by: EMERGENCY MEDICINE

## 2022-04-18 PROCEDURE — 36415 COLL VENOUS BLD VENIPUNCTURE: CPT | Performed by: EMERGENCY MEDICINE

## 2022-04-18 RX ORDER — KETOROLAC TROMETHAMINE 15 MG/ML
15 INJECTION, SOLUTION INTRAMUSCULAR; INTRAVENOUS ONCE
Status: COMPLETED | OUTPATIENT
Start: 2022-04-18 | End: 2022-04-18

## 2022-04-18 RX ADMIN — KETOROLAC TROMETHAMINE 15 MG: 15 INJECTION, SOLUTION INTRAMUSCULAR; INTRAVENOUS at 20:05

## 2022-04-18 NOTE — ED PROVIDER NOTES
History   Chief Complaint:  Chest Wall      History limited by: poor historian    History supplemented by electronic chart review    Kimo Greenwood is a 41 year old male with history of GERD and hypertension who presents with bilateral dull chest pain and numbness for the past 30 minutes. Reports numbness radiates down his arms to his hands. He was here a couple of days ago for chest discomfort, but states this feels different because it is in his shoulders. His chest is tender to palpation, worse in the center. He denies history of heart or lung problems. No history of blood clots.  He is unable to articulate what he wants from this visit.  No fever, cough, or SOB.    Review of Systems   All other systems reviewed and are negative.    Allergies:  The patient has no known allergies.     Medications:  Ventolin HFA  Fiber-lax  Hydrodiuril   Saxenda  Zestril   Protonix  Vitamin D3  Cymbalta  Myrbetriq  Zyprexa  Ditropan XL  Minipress     Past Medical History:     Arthritis  Asthma  Patel's esophagus  Chronic pain  GERD  Hypertension  Leukocytosis  LPRD  Marijuana abuse  MDD  Obesity  LOREN  Seborrheic dermatitis  Sleep apnea   Speech/ language delay  Tobacco use disorder  Nocturnal enuresis  Suicidal ideation  Colonic polyps  Mild intellectual disability  Sacral pain  Duodenitis   Venous stasis dermatitis of both lower extremities  Alcohol use disorder       Past Surgical History:    Arthroplasty hip, left (x2)  Arthroplasty revision hip, left   Bladder surgery  Closed reduction hip, left  Colonoscopy (x4)  EGD, combined (x4)  Exam under anesthesia, fulgurate condyloma anus, combined  Genitourinary surgery   Interstim stimulator implant  MO cystourethroscopy INJ chemodenervation bladder   MO implant periph/ gastric neurostim/       Family History:    Mother: anxiety disorder, depression, ulcerative colitis    Social History:  The patient presents to the ED via ambulance.   PCP: Kory Hudson MD.      Physical Exam     Patient Vitals for the past 24 hrs:   BP Temp Temp src Pulse Resp SpO2 Weight   04/18/22 1842 (!) 151/74 -- -- -- -- -- --   04/18/22 1841 -- 98.1  F (36.7  C) Temporal 80 18 98 % (!) 163.3 kg (360 lb)     Physical Exam  General: Nontoxic-appearing male sitting upright in room FT 5, ambulated into room  HENT: mucous membranes moist  CV: rate as above, regular rhythm, no lower extremity edema, no JVD, palpable symmetric radial pulses, no murmur audible  Resp: normal effort, speaks in full phrases, no stridor, no cough observed  GI: abdomen soft and nontender, no guarding, negative Orozco's sign  MSK: mild reproducible tenderness to central chest, no crepitus, no focal tenderness to upper extremities, no palpable joint effusions  Skin: appropriately warm and dry, no erythema or vesicles to chest wall  Neuro: alert, clear speech, oriented though somewhat poor historian, no nuchal rigidity  Psych: cooperative, no evidence of hallucinations    Emergency Department Course   ECG  ECG obtained at 1848, ECG read at 1852  Likely sinus rhythm.    Rate 83 bpm. AZ interval * ms. QRS duration 110 ms. QT/QTc 388/455 ms. P-R-T axes * -4 56.     Laboratory:  Labs Ordered and Resulted from Time of ED Arrival to Time of ED Departure   COMPREHENSIVE METABOLIC PANEL - Abnormal       Result Value    Sodium 136      Potassium 3.4      Chloride 101      Carbon Dioxide (CO2) 28      Anion Gap 7      Urea Nitrogen 10      Creatinine 0.77      Calcium 9.2      Glucose 110 (*)     Alkaline Phosphatase 110      AST 13      ALT 23      Protein Total 7.1      Albumin 3.8      Bilirubin Total 0.3      GFR Estimate >90     LIPASE - Normal    Lipase 99     TROPONIN I - Normal    Troponin I High Sensitivity 7          Emergency Department Course:         Reviewed:  I reviewed nursing notes, vitals, past medical history and Care Everywhere    Assessments:  1901 I obtained history and examined the patient as noted above.   2030 I  rechecked the patient and explained findings.     Interventions:  2005 Toradol 15mg IV    Disposition:  The patient was discharged to home.     Impression & Plan     Medical Decision Making:  I reviewed his records including visit to this emergency department 2 days ago at which time he had a benign work-up.  He had an echocardiogram in 2018 showing no major structural cardiac conditions.  He has chest discomfort worse with palpation this certainly suggest a benign musculoskeletal etiology, though I considered the possibility of more dangerous etiologies including acute coronary syndrome, aortic pathology, pericarditis, pneumothorax and others.  He has benign cardiopulmonary exam and just had a chest x-ray several days ago that was reassuring, I do not think that this needs to repeat it or that he needs CT of the chest.  No evidence of serious upper abdominal condition causing referred pain to the chest.  Vascular exam is normal, noting minimally elevated blood pressure.  Troponin remains normal and I think that he is safe and appropriate for discharge home and close outpatient follow-up, return here for sudden worsening at any hour.    Diagnosis:    ICD-10-CM    1. Chest pain, unspecified type  R07.9        Scribe Disclosure:  I, Jade Pratt, am serving as a scribe at 6:53 PM on 4/18/2022 to document services personally performed by Benedicto Young MD based on my observations and the provider's statements to me.     This note was completed in part using Dragon voice recognition software. Although reviewed after completion, some word and grammatical errors may occur.      Benedicto Young MD  04/18/22 2048

## 2022-04-18 NOTE — ED TRIAGE NOTES
"C/O chest \"Numbness\" without injury and bilateral finger \"tingling\" Full cardiac workup completed at [facility] ed on 04/16 and was told he pulled a muscle. Pt also transitioning to a group home and probable developmentally delayed per EMS. ABC in tact. A/OX4 324 asa given PTA.  "

## 2022-04-19 ENCOUNTER — PATIENT OUTREACH (OUTPATIENT)
Dept: CARE COORDINATION | Facility: CLINIC | Age: 42
End: 2022-04-19
Payer: COMMERCIAL

## 2022-04-19 DIAGNOSIS — Z71.89 OTHER SPECIFIED COUNSELING: ICD-10-CM

## 2022-04-19 LAB
ATRIAL RATE - MUSE: 300 BPM
DIASTOLIC BLOOD PRESSURE - MUSE: NORMAL MMHG
INTERPRETATION ECG - MUSE: NORMAL
P AXIS - MUSE: NORMAL DEGREES
PR INTERVAL - MUSE: NORMAL MS
QRS DURATION - MUSE: 110 MS
QT - MUSE: 388 MS
QTC - MUSE: 455 MS
R AXIS - MUSE: -4 DEGREES
SYSTOLIC BLOOD PRESSURE - MUSE: NORMAL MMHG
T AXIS - MUSE: 56 DEGREES
VENTRICULAR RATE- MUSE: 83 BPM

## 2022-04-19 NOTE — PROGRESS NOTES
Clinic Care Coordination Contact  [facility]: Post-Discharge Note  SITUATION                                                      Admission:    Admission Date: 04/18/22   Reason for Admission: Chest wall pain  Discharge:   Discharge Date: 04/18/22  Discharge Diagnosis: chest pain, unspecified type    BACKGROUND                                                      Per hospital discharge summary and inpatient provider notes:    Kimo Greenwood is a 41 year old male with history of GERD and hypertension who presents with bilateral dull chest pain and numbness for the past 30 minutes. Reports numbness radiates down his arms to his hands. He was here a couple of days ago for chest discomfort, but states this feels different because it is in his shoulders. His chest is tender to palpation, worse in the center. He denies history of heart or lung problems. No history of blood clots.  He is unable to articulate what he wants from this visit.  No fever, cough, or SOB.    ASSESSMENT      Enrollment  Primary Care Care Coordination Status: Declined     Spoke with patient's mom, Dalia. She is his legal gaurdian and there is a consent to communicate on file. She stated that Willard is doing better today but that he tends to worry a lot. She offered his phone number but advised that calling him might cause him to worry. Since there is a consent to communicate on file and Dalia is his legal guardian W completed TCM call with her.    Discharge Assessment  How are you doing now that you are home?: He's doing better today, he is worried about the elevated white blood cell count but we do have a follow up scheduled for that  How are your symptoms? (Red Flag symptoms escalate to triage hotline per guidelines): Improved  Do you feel your condition is stable enough to be safe at home until your provider visit?: Yes  Does the patient have their discharge instructions? : Yes  Does the patient have questions regarding their  discharge instructions? : No  Were you started on any new medications or were there changes to any of your previous medications? : Yes  Does the patient have all of their medications?: Yes  Do you have questions regarding any of your medications? : No  Do you have all of your needed medical supplies or equipment (DME)?  (i.e. oxygen tank, CPAP, cane, etc.): Yes  Discharge follow-up appointment scheduled within 14 calendar days? : Yes  Discharge Follow Up Appointment Date: 05/25/22  Discharge Follow Up Appointment Scheduled with?: Specialty Care Provider    Post-op ([facility] CTA Only)  If the patient had a surgery or procedure, do they have any questions for a nurse?: No    PLAN                                                      Outpatient Plan:     1 Schedule an appointment with Kory Hudson MD (Family Medicine) in 1 day (4/19/2022)    2 Go to nearest ER; as needed for crisis    Future Appointments   Date Time Provider Department Center   5/2/2022 10:30 AM Dorina Benz, [facility] CR   5/25/2022 10:00 AM [facility]   9/20/2022  1:40 PM [id number] [facility] RS   9/23/2022 11:40 AM Star Ojeda MD [facility]         For any urgent concerns, please contact our 24 hour nurse triage line: [phone] ([phone])         Jacklyn Ramirez  Community Health Worker  [facility]  Ph: [phone]

## 2022-04-20 ENCOUNTER — HOSPITAL ENCOUNTER (EMERGENCY)
Facility: CLINIC | Age: 42
Discharge: HOME | End: 2022-04-21
Attending: EMERGENCY MEDICINE | Admitting: EMERGENCY MEDICINE
Payer: COMMERCIAL

## 2022-04-20 DIAGNOSIS — R00.2 PALPITATIONS: ICD-10-CM

## 2022-04-20 LAB
AMPHETAMINES UR QL SCN: NORMAL
ANION GAP SERPL CALCULATED.3IONS-SCNC: 9 MMOL/L (ref 3–14)
BARBITURATES UR QL: NORMAL
BASOPHILS # BLD AUTO: 0.1 10E3/UL (ref 0–0.2)
BASOPHILS NFR BLD AUTO: 1 %
BENZODIAZ UR QL: NORMAL
BUN SERPL-MCNC: 14 MG/DL (ref 7–30)
CALCIUM SERPL-MCNC: 9.4 MG/DL (ref 8.5–10.1)
CANNABINOIDS UR QL SCN: NORMAL
CHLORIDE BLD-SCNC: 101 MMOL/L (ref 94–109)
CO2 SERPL-SCNC: 27 MMOL/L (ref 20–32)
COCAINE UR QL: NORMAL
CREAT SERPL-MCNC: 0.9 MG/DL (ref 0.66–1.25)
EOSINOPHIL # BLD AUTO: 0.2 10E3/UL (ref 0–0.7)
EOSINOPHIL NFR BLD AUTO: 1 %
ERYTHROCYTE [DISTWIDTH] IN BLOOD BY AUTOMATED COUNT: 13.3 % (ref 10–15)
GFR SERPL CREATININE-BSD FRML MDRD: >90 ML/MIN/1.73M2
GLUCOSE BLD-MCNC: 117 MG/DL (ref 70–99)
HCT VFR BLD AUTO: 37.6 % (ref 40–53)
HGB BLD-MCNC: 12.9 G/DL (ref 13.3–17.7)
HOLD SPECIMEN: NORMAL
IMM GRANULOCYTES # BLD: 0 10E3/UL
IMM GRANULOCYTES NFR BLD: 0 %
LYMPHOCYTES # BLD AUTO: 2.4 10E3/UL (ref 0.8–5.3)
LYMPHOCYTES NFR BLD AUTO: 20 %
MAGNESIUM SERPL-MCNC: 2 MG/DL (ref 1.6–2.3)
MCH RBC QN AUTO: 28.3 PG (ref 26.5–33)
MCHC RBC AUTO-ENTMCNC: 34.3 G/DL (ref 31.5–36.5)
MCV RBC AUTO: 83 FL (ref 78–100)
MONOCYTES # BLD AUTO: 0.6 10E3/UL (ref 0–1.3)
MONOCYTES NFR BLD AUTO: 5 %
NEUTROPHILS # BLD AUTO: 9 10E3/UL (ref 1.6–8.3)
NEUTROPHILS NFR BLD AUTO: 73 %
NRBC # BLD AUTO: 0 10E3/UL
NRBC BLD AUTO-RTO: 0 /100
OPIATES UR QL SCN: NORMAL
PLATELET # BLD AUTO: 263 10E3/UL (ref 150–450)
POTASSIUM BLD-SCNC: 3.4 MMOL/L (ref 3.4–5.3)
RBC # BLD AUTO: 4.56 10E6/UL (ref 4.4–5.9)
SODIUM SERPL-SCNC: 137 MMOL/L (ref 133–144)
TROPONIN I SERPL HS-MCNC: 12 NG/L
WBC # BLD AUTO: 12.3 10E3/UL (ref 4–11)

## 2022-04-20 PROCEDURE — 85025 COMPLETE CBC W/AUTO DIFF WBC: CPT | Performed by: EMERGENCY MEDICINE

## 2022-04-20 PROCEDURE — 90791 PSYCH DIAGNOSTIC EVALUATION: CPT

## 2022-04-20 PROCEDURE — 80307 DRUG TEST PRSMV CHEM ANLYZR: CPT | Performed by: EMERGENCY MEDICINE

## 2022-04-20 PROCEDURE — 99284 EMERGENCY DEPT VISIT MOD MDM: CPT | Performed by: EMERGENCY MEDICINE

## 2022-04-20 PROCEDURE — 84484 ASSAY OF TROPONIN QUANT: CPT | Performed by: EMERGENCY MEDICINE

## 2022-04-20 PROCEDURE — 99285 EMERGENCY DEPT VISIT HI MDM: CPT | Mod: 25 | Performed by: EMERGENCY MEDICINE

## 2022-04-20 PROCEDURE — 80048 BASIC METABOLIC PNL TOTAL CA: CPT | Performed by: EMERGENCY MEDICINE

## 2022-04-20 PROCEDURE — 83735 ASSAY OF MAGNESIUM: CPT | Performed by: EMERGENCY MEDICINE

## 2022-04-20 PROCEDURE — U0005 INFEC AGEN DETEC AMPLI PROBE: HCPCS | Performed by: EMERGENCY MEDICINE

## 2022-04-20 PROCEDURE — 93005 ELECTROCARDIOGRAM TRACING: CPT | Performed by: EMERGENCY MEDICINE

## 2022-04-20 PROCEDURE — C9803 HOPD COVID-19 SPEC COLLECT: HCPCS | Performed by: EMERGENCY MEDICINE

## 2022-04-20 PROCEDURE — 36415 COLL VENOUS BLD VENIPUNCTURE: CPT | Performed by: EMERGENCY MEDICINE

## 2022-04-21 ENCOUNTER — TELEPHONE (OUTPATIENT)
Dept: FAMILY MEDICINE | Facility: CLINIC | Age: 42
End: 2022-04-21
Payer: COMMERCIAL

## 2022-04-21 VITALS
BODY MASS INDEX: 42.66 KG/M2 | OXYGEN SATURATION: 98 % | HEIGHT: 72 IN | TEMPERATURE: 98.1 F | SYSTOLIC BLOOD PRESSURE: 127 MMHG | RESPIRATION RATE: 18 BRPM | HEART RATE: 90 BPM | DIASTOLIC BLOOD PRESSURE: 84 MMHG | WEIGHT: 315 LBS

## 2022-04-21 LAB
ATRIAL RATE - MUSE: 81 BPM
DIASTOLIC BLOOD PRESSURE - MUSE: NORMAL MMHG
HOLD SPECIMEN: NORMAL
INTERPRETATION ECG - MUSE: NORMAL
P AXIS - MUSE: 32 DEGREES
PR INTERVAL - MUSE: 160 MS
QRS DURATION - MUSE: 108 MS
QT - MUSE: 408 MS
QTC - MUSE: 473 MS
R AXIS - MUSE: -7 DEGREES
SARS-COV-2 RNA RESP QL NAA+PROBE: NEGATIVE
SYSTOLIC BLOOD PRESSURE - MUSE: NORMAL MMHG
T AXIS - MUSE: 56 DEGREES
VENTRICULAR RATE- MUSE: 81 BPM

## 2022-04-21 PROCEDURE — 250N000013 HC RX MED GY IP 250 OP 250 PS 637: Performed by: EMERGENCY MEDICINE

## 2022-04-21 RX ORDER — OLANZAPINE 10 MG/1
10 TABLET, ORALLY DISINTEGRATING ORAL ONCE
Status: COMPLETED | OUTPATIENT
Start: 2022-04-21 | End: 2022-04-21

## 2022-04-21 RX ADMIN — OLANZAPINE 10 MG: 10 TABLET, ORALLY DISINTEGRATING ORAL at 00:50

## 2022-04-21 NOTE — ED PROVIDER NOTES
ED Provider Note  [facility]      History     Chief Complaint   Patient presents with     Palpitations     Medication Change     HPI  Kimo Greenwood is a 41 year old male who has a history of major depressive disorder with psychotic features and mild developmental disability.  The history is taken from Willard, and his mother Dalia.  According to Dalia, his mother and legal gaurdian, Willard has had increasing paranoia lately.  She states he manifests his paranoia by worrying about his health.  He has called EMS multiple times because of worry about his health.  She states that his Zyprexa was recently decreased to 2.5mg and this coincided with his increased paranoia.  Willard states that he feels his heart race sometimes.  He has no fever, cough, chest pain.  No history of DVT or PE.  He denies drugs or alcohol.  He is not suicidal and has no thoughts of harming others.     Past Medical History  Past Medical History:   Diagnosis Date     Arthritis     DDD hips and knees     Asthma      Asthma      Patel's esophagus      Chronic pain      Chronic pain - left hip/leg pain     degenerative disc disease, back, hips, knees     Gastroesophageal reflux disease      History of anesthesia complications     agitation x1 after interstim 2013     Hypertension      Hypertension      Leukocytosis      LPRD (laryngopharyngeal reflux disease)      Marijuana abuse      Marijuana abuse      MDD (major depressive disorder)      MDD (major depressive disorder)      Mental retardation, mild (I.Q. 50-70)      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    With associated speech/language delay     Obesity 11/11/2010     LOREN (obstructive sleep apnea)     Uses a CPAP     LOREN (obstructive sleep apnea)      Seborrheic dermatitis      Seborrheic dermatitis      Sleep apnea     CPAP     Past Surgical History:   Procedure Laterality Date     ARTHROPLASTY HIP Left 1/25/2017    Procedure: ARTHROPLASTY HIP;   Surgeon: Bala Randall MD;  Location: [facility] OR     ARTHROPLASTY HIP Left      ARTHROPLASTY REVISION HIP Left 6/24/2019    Procedure: Revision left total hip arthroplasty with a head and liner exchange to a Biomet dual mobility head and liner.;  Surgeon: Bala Randall MD;  Location: [facility] OR     BLADDER SURGERY      Ibarra, [facility],Interstem stimulator implant     CLOSED REDUCTION HIP Left 6/10/2019    Procedure: Closed reduction under general anesthesia left recurrent prosthetic hip dislocation;  Surgeon: Rafat Cazares MD;  Location: [facility] OR     COLONOSCOPY N/A 10/30/2015    Procedure: COMBINED COLONOSCOPY, SINGLE OR MULTIPLE BIOPSY/POLYPECTOMY BY BIOPSY;  Surgeon: Alexis Jackson MD;  Location: [facility] GI     COLONOSCOPY N/A 11/17/2016    Procedure: COMBINED COLONOSCOPY, SINGLE OR MULTIPLE BIOPSY/POLYPECTOMY BY BIOPSY;  Surgeon: Cat Huber MD;  Location: [facility] GI     COLONOSCOPY N/A 10/28/2020    Procedure: COLONOSCOPY;  Surgeon: You Kraus MD;  Location: [facility] OR     COLONOSCOPY       ESOPHAGOSCOPY, GASTROSCOPY, DUODENOSCOPY (EGD), COMBINED N/A 11/17/2016    Procedure: COMBINED ESOPHAGOSCOPY, GASTROSCOPY, DUODENOSCOPY (EGD), BIOPSY SINGLE OR MULTIPLE;  Surgeon: Cat Huber MD;  Location:  GI     ESOPHAGOSCOPY, GASTROSCOPY, DUODENOSCOPY (EGD), COMBINED N/A 3/12/2020    Procedure: ESOPHAGOGASTRODUODENOSCOPY WITH BIOPSIES;  Surgeon: You Kraus MD;  Location:  OR     ESOPHAGOSCOPY, GASTROSCOPY, DUODENOSCOPY (EGD), COMBINED N/A 10/28/2020    Procedure: ESOPHAGOGASTRODUODENOSCOPY, WITH BIOPSY;  Surgeon: You Kraus MD;  Location: [facility] OR     ESOPHAGOSCOPY, GASTROSCOPY, DUODENOSCOPY (EGD), COMBINED       EXAM UNDER ANESTHESIA, FULGURATE CONDYLOMA ANUS, COMBINED N/A 12/22/2016    Procedure: COMBINED EXAM UNDER ANESTHESIA, FULGURATE CONDYLOMA ANUS;  Surgeon: Nya Cabello MD;  Location: [facility] OR     GENITOURINARY SURGERY       JOINT  REPLACEMENT Left 2017    MARGO, Revision Left MARGO     OTHER SURGICAL HISTORY      interstim stimulator implant     [place] CYSTOURETHROSCOPY INJ CHEMODENERVATION BLADDER N/A 1/16/2019    Procedure: CYSTOSCOPY BOTOX BLADDER INJECTIONS (100 UNITS IN 10CC);  Surgeon: Didier Ibarra MD;  Location: [facility];  Service: Urology     [place] IMPLANT PERIPH/GASTRIC NEUROSTIM/ N/A 1/8/2020    Procedure: NEW INTERSTIM LEAD, REPLACE OLD; NEW INTERSTIM BATTERY, REPLACE OLD;  Surgeon: Didier Ibarra MD;  Location: [facility];  Service: Urology     albuterol (VENTOLIN HFA) 108 (90 Base) MCG/ACT inhaler  DULoxetine (CYMBALTA) 60 MG capsule  FIBER- MG tablet  hydrochlorothiazide (HYDRODIURIL) 25 MG tablet  Lidocaine (LIDOCARE) 4 % Patch  liraglutide - Weight Management (SAXENDA) 18 MG/3ML pen  lisinopril (ZESTRIL) 10 MG tablet  mirabegron (MYRBETRIQ) 50 MG 24 hr tablet  naproxen sodium 220 MG capsule  OLANZapine (ZYPREXA) 5 MG tablet  order for DME  oxybutynin ER (DITROPAN XL) 15 MG 24 hr tablet  pantoprazole (PROTONIX) 40 MG EC tablet  prazosin (MINIPRESS) 1 MG capsule  VITAMIN D3 50 MCG (2000 UT) tablet      Allergies   Allergen Reactions     Other [No Clinical Screening - See Comments] Other (See Comments)     Awoke from anesthesia with anger and ripping off dressing, after interstim placement, outside hospital 2013     Family History  Family History   Problem Relation Age of Onset     Anxiety Disorder Mother      Depression Mother      Ulcerative Colitis Mother 18     Breast Cancer Maternal Grandmother      Cerebrovascular Disease Maternal Grandmother      Breast Cancer Maternal Aunt      Cancer Maternal Grandfather         grt uncles colon cancer     Social History   Social History     Tobacco Use     Smoking status: Current Every Day Smoker     Packs/day: 1.00     Years: 1.00     Pack years: 1.00     Types: Vaping Device, Cigarettes     Smokeless tobacco: Current User     Types: Chew     Tobacco  "comment: Smokes E Cigs equal to 1 PPD   Vaping Use     Vaping Use: Every day     Substances: Nicotine, Flavoring     Devices: Refillable tank   Substance Use Topics     Alcohol use: Yes     Comment: socially--\"not very often\" \"once a month\"     Drug use: No     Comment: patient denies any marijuana use      Past medical history, past surgical history, medications, allergies, family history, and social history were reviewed with the patient. No additional pertinent items.       Review of Systems  A complete review of systems was performed with pertinent positives and negatives noted in the HPI, and all other systems negative.    Physical Exam   BP: 127/85  Pulse: 79  Temp: 98.1  F (36.7  C)  Resp: 16  Height: 182.9 cm (6')  Weight: (!) 165.6 kg (365 lb)  SpO2: 98 %  Physical Exam  Vitals and nursing note reviewed.   Constitutional:       General: He is not in acute distress.     Appearance: He is not diaphoretic.   HENT:      Head: Normocephalic and atraumatic.   Cardiovascular:      Rate and Rhythm: Normal rate and regular rhythm.      Pulses: Normal pulses.      Heart sounds: Normal heart sounds.   Pulmonary:      Effort: Pulmonary effort is normal. No respiratory distress.      Breath sounds: No wheezing or rales.   Abdominal:      General: There is no distension.      Palpations: Abdomen is soft.      Tenderness: There is no abdominal tenderness. There is no guarding.   Musculoskeletal:         General: Normal range of motion.      Cervical back: Normal range of motion and neck supple.      Right lower leg: No edema.      Left lower leg: No edema.   Skin:     General: Skin is warm and dry.      Coloration: Skin is not pale.      Findings: No erythema.   Neurological:      General: No focal deficit present.      Mental Status: He is alert and oriented to person, place, and time.      Sensory: No sensory deficit.   Psychiatric:         Mood and Affect: Mood normal.         Behavior: Behavior normal.         Thought " Content: Thought content normal.         ED Course      Procedures       The medical record was reviewed and interpreted.  Current labs reviewed and interpreted.  EKG reviewed and interpreted: NSR.              Results for orders placed or performed during the hospital encounter of 04/20/22   [place] Draw     Status: None    Narrative    The following orders were created for panel order [place] Draw.  Procedure                               Abnormality         Status                     ---------                               -----------         ------                     Extra Blue Top Tube[[id number]]                              Final result               Extra Green Top (Lithium...[[id number]]                      Final result               Extra Purple Top Tube[[id number]]                            Final result                 Please view results for these tests on the individual orders.   Extra Blue Top Tube     Status: None   Result Value Ref Range    Hold Specimen JIC    Extra Green Top (Lithium Heparin) Tube     Status: None   Result Value Ref Range    Hold Specimen JIC    Extra Purple Top Tube     Status: None   Result Value Ref Range    Hold Specimen JIC    Basic metabolic panel     Status: Abnormal   Result Value Ref Range    Sodium 137 133 - 144 mmol/L    Potassium 3.4 3.4 - 5.3 mmol/L    Chloride 101 94 - 109 mmol/L    Carbon Dioxide (CO2) 27 20 - 32 mmol/L    Anion Gap 9 3 - 14 mmol/L    Urea Nitrogen 14 7 - 30 mg/dL    Creatinine 0.90 0.66 - 1.25 mg/dL    Calcium 9.4 8.5 - 10.1 mg/dL    Glucose 117 (H) 70 - 99 mg/dL    GFR Estimate >90 >60 mL/min/1.73m2   Magnesium     Status: Normal   Result Value Ref Range    Magnesium 2.0 1.6 - 2.3 mg/dL   Troponin I     Status: Normal   Result Value Ref Range    Troponin I High Sensitivity 12 <79 ng/L   CBC with platelets and differential     Status: Abnormal   Result Value Ref Range    WBC Count 12.3 (H) 4.0 - 11.0 10e3/uL    RBC Count 4.56 4.40 - 5.90 10e6/uL     Hemoglobin 12.9 (L) 13.3 - 17.7 g/dL    Hematocrit 37.6 (L) 40.0 - 53.0 %    MCV 83 78 - 100 fL    MCH 28.3 26.5 - 33.0 pg    MCHC 34.3 31.5 - 36.5 g/dL    RDW 13.3 10.0 - 15.0 %    Platelet Count 263 150 - 450 10e3/uL    % Neutrophils 73 %    % Lymphocytes 20 %    % Monocytes 5 %    % Eosinophils 1 %    % Basophils 1 %    % Immature Granulocytes 0 %    NRBCs per 100 WBC 0 <1 /100    Absolute Neutrophils 9.0 (H) 1.6 - 8.3 10e3/uL    Absolute Lymphocytes 2.4 0.8 - 5.3 10e3/uL    Absolute Monocytes 0.6 0.0 - 1.3 10e3/uL    Absolute Eosinophils 0.2 0.0 - 0.7 10e3/uL    Absolute Basophils 0.1 0.0 - 0.2 10e3/uL    Absolute Immature Granulocytes 0.0 <=0.4 10e3/uL    Absolute NRBCs 0.0 10e3/uL   Extra Tube     Status: None (In process)    Narrative    The following orders were created for panel order Extra Tube.  Procedure                               Abnormality         Status                     ---------                               -----------         ------                     Extra Urine Collection[[id number]]                           In process                   Please view results for these tests on the individual orders.   CBC with platelets differential     Status: Abnormal    Narrative    The following orders were created for panel order CBC with platelets differential.  Procedure                               Abnormality         Status                     ---------                               -----------         ------                     CBC with platelets and d...[[id number]]  Abnormal            Final result                 Please view results for these tests on the individual orders.     Medications - No data to display     Assessments & Plan (with Medical Decision Making)   Willard presents with symptoms of palpitations from his group home.  His mother is concerned that he is having increasing paranoia, in this case related to his health, after his Zyprexa was decreased.  Dalia, his mother, states that  he often becomes paranoid about his health.  [place] EKG and basic labs were normal.  His vital signs and exam were also normal.  He states he does experience some anxiety, but he is not suicidal.  Willard's Zyprexa was being increased from 2.5mg to 7.5mg per day.  He was given Zyprexa 10mg in the ER.  His mother agrees he is suitable for discharge with a plan to continue his Zyprexa at 7.5mg per day and follow up with his mental health provider.      I have reviewed the nursing notes. I have reviewed the findings, diagnosis, plan and need for follow up with the patient.    New Prescriptions    No medications on file       Final diagnoses:   None       --  Eh Iqbal MD  [facility] EMERGENCY DEPARTMENT  4/20/2022     Eh Iqbal MD  04/24/22 2021

## 2022-04-21 NOTE — ED NOTES
"Dalia Ortega (mother and legal guardian #[phone]) called for patient update. Informed labs were completed and pending results. Dalia shared with writer, \"His dose of olanzapine was recently decreased. I've noticed since that he has become very paranoid that he's dying. He's called the ambulance three times in the past couple of days. Tonight he was supposed to re-start his regular dose of 7.5mg olanzapine. He hasn't had that dose yet tonight\". Dalia states patient lives in an adult foster care home. Foster care parents Paola # [phone] and Erick # [phone].  Jaja Lawrence RN on 4/20/2022 at 10:15 PM    " 23-Aug[birth year]2018

## 2022-04-21 NOTE — DISCHARGE INSTRUCTIONS
Your lab and EKG tests were normal today.  You were given your evening dose of Zyprexa, and don't need to take this tonight.  Please follow up with your outpatient providers as scheduled.       Aftercare Plan  If I am feeling unsafe or I am in a crisis, I will:   Contact my established care providers   Call the National Suicide Prevention Lifeline: [phone]   Go to the nearest emergency room   Call 911     Warning signs that I or other people might notice when a crisis is developing for me: I become worried I am terminally ill, I believe people may be hiding information from me and I think people are talking negatively about me.     Things I am able to do on my own to cope or help me feel better: I can use checking the facts worksheet, or engage in any of the following skills:      The box breathing method  1. Close your eyes. Breathe in through your nose while counting to four slowly. ...   2. Hold your breath inside while counting slowly to four. Try not to clamp your mouth or nose shut.   3. Begin to slowly exhale for 4 seconds.  4. Repeat steps 1 to 3 at least 10 times.    These techniques use your five senses or tangible objects -- things you can touch -- to help you move through distress.  1. Put your hands in cold water. ...   2.  or touch items near you. ...   3. Breathe deeply. ...   4. Savor a food or drink. ...   5. Take a short walk. ...   6. Hold a piece of ice. ...   7. Savor a scent. ...   8. Move your body.    5,4,3,2,1 Grounding Exercise   Grounding is a technique that helps us reorient to the here-and-now, to bring up into the present. They are a useful technique if you ever feel overwhelmed, intensely anxious, or dissociated from your environment. The  5,4,3,2,1  exercise is a common sensory awareness grounding exercise that many find helpful to relax or get through difficult moments.  1. Describe 5 things you can see in the room  2. Name 4 things you can feel ( my feet on the floor  or   the air in my nose )  3. Name 3 things you are hear right now ( traffic outside )  4. Name 2 things you can smell right now (or 2 smells that you like)  5. Name 1 thing you like about yourself    5 4 3 2 1 CALM exercise to engage your senses:    5: Acknowledge FIVE things you see around you. Maybe it is a bird, maybe it is pencil, maybe it is a spot on the ceiling, however big or small, state 5 things you see.  4: Acknowledge FOUR things you can touch around you. Maybe this is your hair, hands, ground, grass, pillow, etc, whatever it may be, list out the 4 things you can feel.  3: Acknowledge THREE things you hear. This needs to be external, do not focus on your thoughts; maybe you can hear a clock, a car, a dog park. or maybe you hear your tummy rumbling, internal noises that make external sounds can count, what is audible in the moment is what you list.  2: Acknowledge TWO things you can smell: This one might be hard if you are not in a stimulating environment, if you cannot automatically sniff something out, walk nearby to find a scent. Maybe you walk to your bathroom to smell soap or outside to smell anything in nature, or even could be as simple as leaning over and smelling a pillow on the couch, or a pencil. Whatever it may be, take in the smells around you.  1. Acknowledge ONE thing you can taste. What does the inside of your mouth taste like, gum, coffee, or the sandwich from lunch? Focus on your mouth as the last step and take in what you can taste.    Build your own coping tools good:  [employer] is an good available for free to help you collect your inspiration and define your coping strategies.     Things that I am able to do with others to cope or help me feel better: I can call my DBT therapist and check the facts with him, I can call a friend and go to a movie or play a game with them, I can go for a walk with my foster family.     Things I can use or do for distraction: go for a walk, journal,  "color, play a game, meditate     Changes I can make to support my mental health and wellness: exercise 15 minutes a day to increase positive chemicals in my brain, I can eat healthy, I can take my medications, I can get enough sleep and I can be willing to ask for help and accept help/     People in my life that I can ask for help: mother, therapists, nurse practitioner, foster family, father, friends.     Your [facility] has a mental health crisis team you can call 24/7: [facility]  [phone]    Other things that are important when I m in crisis: make sure to be honest, check the facts and practice my DBT skills.     Appointment information and/or additional resources available to me: please feel free to reach out to [facility] [phone] if you do decide you would like to explore different options for therapists, day programs or psychiatrists/med management.      Crisis Lines  Crisis Text Line  Text 318390  You will be connected with a trained live crisis counselor to provide support.    Por espanol, texto  TAO a 643856 o texto a 442-AYUDAME en WhatsApp    National Hope Line  1.800.SUICIDE [3469452]      Community Resources  Fast Tracker  Linking people to mental health and substance use disorder resources  fasttrackMetabolonn.org     [place] Mental Health Warm Line  Peer to peer support  Monday thru Saturday, 12 pm to 10 pm  721.318.6853 or 4.236.320.8365  Text \"Support\" to 19673    National [place] on Mental [employer]ness (AZEB)  955.423.7722 or 1.888.AZEB.HELPS        Mental Health Apps  My3  https://my3app.org/    VirtualHopeBox  https://Frog[employer] Bop.org/apps/virtual-hope-box/      Additional Information  Today you were seen by a licensed mental health professional through Triage and Transition services, [facility] ([facility])  for a crisis assessment in the Emergency Department at [facility].  It is recommended that you follow up with your established providers (psychiatrist, " mental health therapist, and/or primary care doctor - as relevant) as soon as possible. Coordinators from [facility] will be calling you in the next 24-48 hours to ensure that you have the resources you need.  You can also contact [facility] coordinators directly at [phone]. You may have been scheduled for or offered an appointment with a mental health provider. [facility] maintains an extensive network of licensed behavioral health providers to connect patients with the services they need.  We do not charge providers a fee to participate in our referral network.  We match patients with providers based on a patient's specific needs, insurance coverage, and location.  Our first effort will be to refer you to a provider within your care system, and will utilize providers outside your care system as needed.

## 2022-04-21 NOTE — ED TRIAGE NOTES
"Patient presents via EMS. Per EMS, patient needing evaluation after Zyprexa medication dosage change. States patient lives in group home (mother is guardian) and group home indicated need for increase in Zyprexa. No other information regarding mental health. Patient reports \"chest pains\" the past two days and palpitations today. Denies s/s of chest pain, palpitations, and shortness of breath upon arrival to ED. Denies HI/SI. Patient calm and appropriate with staff upon arrival to ED. Jaja Lawrence RN on 4/20/2022 at 8:34 PM    "
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
## 2022-04-22 ENCOUNTER — HOSPITAL ENCOUNTER (EMERGENCY)
Facility: CLINIC | Age: 42
Discharge: LEFT BEFORE DISCHARGE | End: 2022-04-23
Admitting: EMERGENCY MEDICINE
Payer: COMMERCIAL

## 2022-04-22 VITALS
HEIGHT: 72 IN | BODY MASS INDEX: 42.66 KG/M2 | TEMPERATURE: 98.6 F | OXYGEN SATURATION: 98 % | SYSTOLIC BLOOD PRESSURE: 146 MMHG | DIASTOLIC BLOOD PRESSURE: 67 MMHG | RESPIRATION RATE: 20 BRPM | HEART RATE: 77 BPM | WEIGHT: 315 LBS

## 2022-04-22 LAB
ALBUMIN SERPL-MCNC: 3.9 G/DL (ref 3.4–5)
ALP SERPL-CCNC: 103 U/L (ref 40–150)
ALT SERPL W P-5'-P-CCNC: 24 U/L (ref 0–70)
ANION GAP SERPL CALCULATED.3IONS-SCNC: 4 MMOL/L (ref 3–14)
AST SERPL W P-5'-P-CCNC: 9 U/L (ref 0–45)
BASOPHILS # BLD AUTO: 0.1 10E3/UL (ref 0–0.2)
BASOPHILS NFR BLD AUTO: 1 %
BILIRUB SERPL-MCNC: 0.4 MG/DL (ref 0.2–1.3)
BUN SERPL-MCNC: 17 MG/DL (ref 7–30)
CALCIUM SERPL-MCNC: 9.2 MG/DL (ref 8.5–10.1)
CHLORIDE BLD-SCNC: 99 MMOL/L (ref 94–109)
CO2 SERPL-SCNC: 31 MMOL/L (ref 20–32)
CREAT SERPL-MCNC: 0.96 MG/DL (ref 0.66–1.25)
EOSINOPHIL # BLD AUTO: 0.1 10E3/UL (ref 0–0.7)
EOSINOPHIL NFR BLD AUTO: 1 %
ERYTHROCYTE [DISTWIDTH] IN BLOOD BY AUTOMATED COUNT: 13.2 % (ref 10–15)
GFR SERPL CREATININE-BSD FRML MDRD: >90 ML/MIN/1.73M2
GLUCOSE BLD-MCNC: 99 MG/DL (ref 70–99)
HCT VFR BLD AUTO: 39.1 % (ref 40–53)
HGB BLD-MCNC: 12.9 G/DL (ref 13.3–17.7)
IMM GRANULOCYTES # BLD: 0 10E3/UL
IMM GRANULOCYTES NFR BLD: 0 %
LYMPHOCYTES # BLD AUTO: 1.8 10E3/UL (ref 0.8–5.3)
LYMPHOCYTES NFR BLD AUTO: 17 %
MCH RBC QN AUTO: 28.2 PG (ref 26.5–33)
MCHC RBC AUTO-ENTMCNC: 33 G/DL (ref 31.5–36.5)
MCV RBC AUTO: 85 FL (ref 78–100)
MONOCYTES # BLD AUTO: 0.7 10E3/UL (ref 0–1.3)
MONOCYTES NFR BLD AUTO: 7 %
NEUTROPHILS # BLD AUTO: 8.3 10E3/UL (ref 1.6–8.3)
NEUTROPHILS NFR BLD AUTO: 74 %
NRBC # BLD AUTO: 0 10E3/UL
NRBC BLD AUTO-RTO: 0 /100
PLATELET # BLD AUTO: 253 10E3/UL (ref 150–450)
POTASSIUM BLD-SCNC: 3.5 MMOL/L (ref 3.4–5.3)
PROT SERPL-MCNC: 7.5 G/DL (ref 6.8–8.8)
RBC # BLD AUTO: 4.58 10E6/UL (ref 4.4–5.9)
SODIUM SERPL-SCNC: 134 MMOL/L (ref 133–144)
WBC # BLD AUTO: 11.1 10E3/UL (ref 4–11)

## 2022-04-22 PROCEDURE — 80053 COMPREHEN METABOLIC PANEL: CPT | Performed by: EMERGENCY MEDICINE

## 2022-04-22 PROCEDURE — 36415 COLL VENOUS BLD VENIPUNCTURE: CPT | Performed by: EMERGENCY MEDICINE

## 2022-04-22 PROCEDURE — 85025 COMPLETE CBC W/AUTO DIFF WBC: CPT | Performed by: EMERGENCY MEDICINE

## 2022-04-22 PROCEDURE — 999N000104 HC STATISTIC NO CHARGE
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
## 2022-04-24 ENCOUNTER — HOSPITAL ENCOUNTER (EMERGENCY)
Facility: CLINIC | Age: 42
Discharge: ANOTHER HOSPITAL | End: 2022-04-25
Attending: EMERGENCY MEDICINE | Admitting: EMERGENCY MEDICINE
Payer: COMMERCIAL

## 2022-04-24 DIAGNOSIS — F22 PARANOIA (H): ICD-10-CM

## 2022-04-24 DIAGNOSIS — F79 INTELLECTUAL DISABILITY: ICD-10-CM

## 2022-04-24 LAB
AMPHETAMINES UR QL SCN: NORMAL
BARBITURATES UR QL: NORMAL
BENZODIAZ UR QL: NORMAL
CANNABINOIDS UR QL SCN: NORMAL
COCAINE UR QL: NORMAL
OPIATES UR QL SCN: NORMAL
SARS-COV-2 RNA RESP QL NAA+PROBE: NEGATIVE

## 2022-04-24 PROCEDURE — 99285 EMERGENCY DEPT VISIT HI MDM: CPT | Mod: 25 | Performed by: EMERGENCY MEDICINE

## 2022-04-24 PROCEDURE — 99285 EMERGENCY DEPT VISIT HI MDM: CPT | Performed by: EMERGENCY MEDICINE

## 2022-04-24 PROCEDURE — 999N000157 HC STATISTIC RCP TIME EA 10 MIN

## 2022-04-24 PROCEDURE — 250N000013 HC RX MED GY IP 250 OP 250 PS 637: Performed by: EMERGENCY MEDICINE

## 2022-04-24 PROCEDURE — 94660 CPAP INITIATION&MGMT: CPT

## 2022-04-24 PROCEDURE — 87635 SARS-COV-2 COVID-19 AMP PRB: CPT | Performed by: EMERGENCY MEDICINE

## 2022-04-24 PROCEDURE — 80307 DRUG TEST PRSMV CHEM ANLYZR: CPT | Performed by: EMERGENCY MEDICINE

## 2022-04-24 PROCEDURE — 90791 PSYCH DIAGNOSTIC EVALUATION: CPT

## 2022-04-24 PROCEDURE — C9803 HOPD COVID-19 SPEC COLLECT: HCPCS | Performed by: EMERGENCY MEDICINE

## 2022-04-24 RX ORDER — VITAMIN B COMPLEX
50 TABLET ORAL DAILY
Status: DISCONTINUED | OUTPATIENT
Start: 2022-04-25 | End: 2022-04-25 | Stop reason: HOSPADM

## 2022-04-24 RX ORDER — DULOXETIN HYDROCHLORIDE 30 MG/1
120 CAPSULE, DELAYED RELEASE ORAL DAILY
Status: DISCONTINUED | OUTPATIENT
Start: 2022-04-25 | End: 2022-04-25 | Stop reason: HOSPADM

## 2022-04-24 RX ORDER — CALCIUM POLYCARBOPHIL 625 MG 625 MG/1
1250 TABLET ORAL 2 TIMES DAILY
Status: DISCONTINUED | OUTPATIENT
Start: 2022-04-24 | End: 2022-04-25 | Stop reason: HOSPADM

## 2022-04-24 RX ORDER — ALBUTEROL SULFATE 90 UG/1
1-2 AEROSOL, METERED RESPIRATORY (INHALATION) EVERY 6 HOURS PRN
Status: DISCONTINUED | OUTPATIENT
Start: 2022-04-24 | End: 2022-04-25 | Stop reason: HOSPADM

## 2022-04-24 RX ORDER — CALCIUM POLYCARBOPHIL 625 MG 625 MG/1
625 TABLET ORAL DAILY
Status: DISCONTINUED | OUTPATIENT
Start: 2022-04-24 | End: 2022-04-24

## 2022-04-24 RX ORDER — LISINOPRIL 10 MG/1
10 TABLET ORAL EVERY MORNING
Status: DISCONTINUED | OUTPATIENT
Start: 2022-04-25 | End: 2022-04-25 | Stop reason: HOSPADM

## 2022-04-24 RX ORDER — PANTOPRAZOLE SODIUM 40 MG/1
40 TABLET, DELAYED RELEASE ORAL
Status: DISCONTINUED | OUTPATIENT
Start: 2022-04-25 | End: 2022-04-25 | Stop reason: HOSPADM

## 2022-04-24 RX ORDER — POLYETHYLENE GLYCOL 3350 17 G
2 POWDER IN PACKET (EA) ORAL
Status: DISCONTINUED | OUTPATIENT
Start: 2022-04-24 | End: 2022-04-25 | Stop reason: HOSPADM

## 2022-04-24 RX ORDER — OXYBUTYNIN CHLORIDE 10 MG/1
30 TABLET, EXTENDED RELEASE ORAL DAILY
Status: DISCONTINUED | OUTPATIENT
Start: 2022-04-25 | End: 2022-04-25 | Stop reason: HOSPADM

## 2022-04-24 RX ORDER — NAPROXEN 250 MG/1
500 TABLET ORAL 2 TIMES DAILY WITH MEALS
Status: DISCONTINUED | OUTPATIENT
Start: 2022-04-25 | End: 2022-04-25 | Stop reason: HOSPADM

## 2022-04-24 RX ORDER — HYDROCHLOROTHIAZIDE 25 MG/1
25 TABLET ORAL DAILY
Status: DISCONTINUED | OUTPATIENT
Start: 2022-04-25 | End: 2022-04-25 | Stop reason: HOSPADM

## 2022-04-24 RX ORDER — MIRABEGRON 25 MG/1
50 TABLET, FILM COATED, EXTENDED RELEASE ORAL DAILY
Status: DISCONTINUED | OUTPATIENT
Start: 2022-04-25 | End: 2022-04-25 | Stop reason: HOSPADM

## 2022-04-24 RX ADMIN — PRAZOSIN HYDROCHLORIDE 3 MG: 2 CAPSULE ORAL at 22:13

## 2022-04-24 RX ADMIN — NICOTINE POLACRILEX 2 MG: 2 LOZENGE ORAL at 20:03

## 2022-04-24 RX ADMIN — OLANZAPINE 7.5 MG: 5 TABLET, FILM COATED ORAL at 22:12

## 2022-04-24 RX ADMIN — CALCIUM POLYCARBOPHIL 1250 MG: 625 TABLET, FILM COATED ORAL at 20:51

## 2022-04-24 ASSESSMENT — ENCOUNTER SYMPTOMS
FEVER: 0
CONFUSION: 0
SORE THROAT: 0
CHILLS: 0
DIFFICULTY URINATING: 0
HEADACHES: 0
HALLUCINATIONS: 0
NAUSEA: 1
SHORTNESS OF BREATH: 0
AGITATION: 1
DIARRHEA: 0
COLOR CHANGE: 0
ABDOMINAL PAIN: 1
VOMITING: 0
ARTHRALGIAS: 0
COUGH: 1

## 2022-04-24 NOTE — ED PROVIDER NOTES
"    [facility] EMERGENCY DEPARTMENT ([facility])       4/24/22  History     Chief Complaint   Patient presents with     Paranoid     Pt's NP slowly weaning Olanzepine x 3 months but lately pt's Mother noted increase in paranoia, pt has been calling 911 multiple times. Pt thinks he is dying and that everyone is lying to him. NP has increased the Olanzepine back to 7.5 mg but pt is still not psychiatrically stable. Pt and pt's Mother requesting inpatient admission     HPI  Kimo Greenwood is a 41 year old male with a past medical history significant for polysubstance abuse (alcohol, tobacco, and cannabis), MDD, suicidal ideation, mild intellectual disability, leukocytosis, hypertension who presents to the Emergency Department for evaluation of paranoia.    Patient has a long hx of depression with psychosis. His mother and sister are his legal guardians.  Patient was living in a crisis residence for 8 months and at the end of February moved to a foster care facility.  Initially he was the only resident in the foster care facility and he seemed to get bored and lonely. Later on there were residents who moved to the second floor and he became convinced that they were talking about him.  For instance, he reports that he heard the staff at his foster care facility telling his sister that he was \"sick \".  Patient's mother contradicts this.  The patient is currently staying with mom at her home as she feels that he is too unstable to stay at his foster care facility.  He previously was on olanzapine 7.5 mg at night and that seemed to keep him relatively stable.  However his nurse practitioner began to decrease his olanzapine several weeks ago, first down to 5 milligrams at night and then down to 2.5 mg.  Since that time his paranoia has gotten significantly worse, to the point where he has called 911 on multiple occasions in the past several days, convinced that he is dying.  He has been looking things up on his " "phone, Googling terminal illness symptoms.  He has convinced himself that he is terminally ill and that his family and medical personnel are lying to him and that he is in fact dying.  For instance, he has looked up symptoms of leukemia and has convinced himself that he is dying of leukemia.  He has been evaluated at various medical facilities in the past several days and has been reassured that they do not see any sign of serious illness.  Patient is not convinced that this is the truth.  He denies any auditory hallucinations, he denies any suicidal ideation or homicidal ideation.  Patient's mother reports that when he gets in these episodes where he is hyper focused on terminal illness, he tries to call 911 and is very agitated.  When his mother or his family try to stop him from calling 911 he becomes even more agitated and upset.    Mom is seeking hospitalization for him to try to get him stabilized, as she believes that she must watch him 24/7 in order to prevent him from getting access to a telephone and calling 911 repeatedly.    Mom reports that the patient is taking his medications as she does supervise him taking his meds and make sure that he does indeed swallow them.    Patient denies drug or alcohol use.    Per chart review, patient was seen at [facility] ED on 04/20/2022 for evaluation of palpitations. Patient reported his heart started to beat really fast and indicated thinking \"it was going to beat out of my chest\". He reported going on the internet and looking up rapid heart rate and he stated \"I knew I shouldn't have done that\" and the internet informed patient this is a sign of terminal illness. Patient reported he does not understand why when his antipsychotic dosage is lowered he becomes \"paranoid\" but he reported he not only gets \"paranoid\" about his family being dishonest with him. Patient reported he started experiencing this \"paranoia\" about one year ago and reported he was placed on an " "antipsychotic medication for it and he reported that has helped him and allows him to not find the above mentioned symptoms debilitating. He indicated one time his nurse practitioner decreased his dosage of antipsychotic medications and this \"paranoia\" came back until the medication was increased again and patient reported it took about 3 weeks to one month to experience relief from the \"paranoia\" once the medication dosage was increased to original efficacy.  Patient's olanzapine was increased back to 7.5 mg (was previously at 5 mg) on discharge.    Past Medical History:   Diagnosis Date     Arthritis     DDD hips and knees     Asthma      Asthma      Patel's esophagus      Chronic pain      Chronic pain - left hip/leg pain     degenerative disc disease, back, hips, knees     Gastroesophageal reflux disease      History of anesthesia complications     agitation x1 after interstim 2013     Hypertension      Hypertension      Leukocytosis      LPRD (laryngopharyngeal reflux disease)      Marijuana abuse      Marijuana abuse      MDD (major depressive disorder)      MDD (major depressive disorder)      Mental retardation, mild (I.Q. 50-70)      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    With associated speech/language delay     Obesity 11/11/2010     LOREN (obstructive sleep apnea)     Uses a CPAP     LOREN (obstructive sleep apnea)      Seborrheic dermatitis      Seborrheic dermatitis      Sleep apnea     CPAP       Past Surgical History:   Procedure Laterality Date     ARTHROPLASTY HIP Left 1/25/2017    Procedure: ARTHROPLASTY HIP;  Surgeon: Bala Randall MD;  Location: [facility] OR     ARTHROPLASTY HIP Left      ARTHROPLASTY REVISION HIP Left 6/24/2019    Procedure: Revision left total hip arthroplasty with a head and liner exchange to a Biomet dual mobility head and liner.;  Surgeon: Bala Randall MD;  Location: [facility] OR     BLADDER SURGERY      Fred, Alida,Interstem stimulator implant "     CLOSED REDUCTION HIP Left 6/10/2019    Procedure: Closed reduction under general anesthesia left recurrent prosthetic hip dislocation;  Surgeon: Rafat Cazares MD;  Location: [facility] OR     COLONOSCOPY N/A 10/30/2015    Procedure: COMBINED COLONOSCOPY, SINGLE OR MULTIPLE BIOPSY/POLYPECTOMY BY BIOPSY;  Surgeon: Alexis Jackson MD;  Location: [facility] GI     COLONOSCOPY N/A 11/17/2016    Procedure: COMBINED COLONOSCOPY, SINGLE OR MULTIPLE BIOPSY/POLYPECTOMY BY BIOPSY;  Surgeon: Cat Huber MD;  Location: [facility] GI     COLONOSCOPY N/A 10/28/2020    Procedure: COLONOSCOPY;  Surgeon: You Kraus MD;  Location: [facility] OR     COLONOSCOPY       ESOPHAGOSCOPY, GASTROSCOPY, DUODENOSCOPY (EGD), COMBINED N/A 11/17/2016    Procedure: COMBINED ESOPHAGOSCOPY, GASTROSCOPY, DUODENOSCOPY (EGD), BIOPSY SINGLE OR MULTIPLE;  Surgeon: Cat Huber MD;  Location: [facility] GI     ESOPHAGOSCOPY, GASTROSCOPY, DUODENOSCOPY (EGD), COMBINED N/A 3/12/2020    Procedure: ESOPHAGOGASTRODUODENOSCOPY WITH BIOPSIES;  Surgeon: You Kraus MD;  Location: [facility] OR     ESOPHAGOSCOPY, GASTROSCOPY, DUODENOSCOPY (EGD), COMBINED N/A 10/28/2020    Procedure: ESOPHAGOGASTRODUODENOSCOPY, WITH BIOPSY;  Surgeon: You Kraus MD;  Location: [facility] OR     ESOPHAGOSCOPY, GASTROSCOPY, DUODENOSCOPY (EGD), COMBINED       EXAM UNDER ANESTHESIA, FULGURATE CONDYLOMA ANUS, COMBINED N/A 12/22/2016    Procedure: COMBINED EXAM UNDER ANESTHESIA, FULGURATE CONDYLOMA ANUS;  Surgeon: Nya Cabello MD;  Location: [facility] OR     GENITOURINARY SURGERY       JOINT REPLACEMENT Left 2017    MARGO, Revision Left MARGO     OTHER SURGICAL HISTORY      interstim stimulator implant     KS CYSTOURETHROSCOPY INJ CHEMODENERVATION BLADDER N/A 1/16/2019    Procedure: CYSTOSCOPY BOTOX BLADDER INJECTIONS (100 UNITS IN 10CC);  Surgeon: Didier Ibarra MD;  Location: [facility];  Service: Urology     KS IMPLANT PERIPH/GASTRIC NEUROSTIM/ N/A 1/8/2020     "Procedure: NEW INTERSTIM LEAD, REPLACE OLD; NEW INTERSTIM BATTERY, REPLACE OLD;  Surgeon: Didier Ibarra MD;  Location: [facility];  Service: Urology       Family History   Problem Relation Age of Onset     Anxiety Disorder Mother      Depression Mother      Ulcerative Colitis Mother 18     Breast Cancer Maternal Grandmother      Cerebrovascular Disease Maternal Grandmother      Breast Cancer Maternal Aunt      Cancer Maternal Grandfather         grt uncles colon cancer       Social History     Tobacco Use     Smoking status: Current Every Day Smoker     Packs/day: 1.00     Years: 1.00     Pack years: 1.00     Types: Vaping Device, Cigarettes     Smokeless tobacco: Current User     Types: Chew     Tobacco comment: Smokes E Cigs equal to 1 PPD   Substance Use Topics     Alcohol use: Yes     Comment: socially--\"not very often\" \"once a month\"       Current Facility-Administered Medications   Medication     albuterol (PROVENTIL HFA/VENTOLIN HFA) inhaler     calcium polycarbophil (FIBERCON) tablet 625 mg     DULoxetine (CYMBALTA) DR capsule 120 mg     hydrochlorothiazide (HYDRODIURIL) tablet 25 mg     [START ON 4/25/2022] lisinopril (ZESTRIL) tablet 10 mg     mirabegron (MYRBETRIQ) 24 hr tablet 50 mg     [START ON 4/25/2022] naproxen sodium CAPS 220 mg     OLANZapine (zyPREXA) half-tab 7.5 mg     oxybutynin ER (DITROPAN-XL) 24 hr tablet 30 mg     [START ON 4/25/2022] pantoprazole (PROTONIX) EC tablet 40 mg     prazosin (MINIPRESS) capsule 3 mg     Vitamin D3 (CHOLECALCIFEROL) tablet 50 mcg     Current Outpatient Medications   Medication     OLANZapine (ZYPREXA) 5 MG tablet     albuterol (VENTOLIN HFA) 108 (90 Base) MCG/ACT inhaler     DULoxetine (CYMBALTA) 60 MG capsule     FIBER- MG tablet     hydrochlorothiazide (HYDRODIURIL) 25 MG tablet     Lidocaine (LIDOCARE) 4 % Patch     liraglutide - Weight Management (SAXENDA) 18 MG/3ML pen     lisinopril (ZESTRIL) 10 MG tablet     mirabegron (MYRBETRIQ) 50 MG " 24 hr tablet     naproxen sodium 220 MG capsule     order for DME     oxybutynin ER (DITROPAN XL) 15 MG 24 hr tablet     pantoprazole (PROTONIX) 40 MG EC tablet     prazosin (MINIPRESS) 1 MG capsule     VITAMIN D3 50 MCG (2000 UT) tablet        Allergies   Allergen Reactions     Other [No Clinical Screening - See Comments] Other (See Comments)     Awoke from anesthesia with anger and ripping off dressing, after interstim placement, outside hospital 2013        I have reviewed the Medications, Allergies, Past Medical and Surgical History, and Social History in the Epic system.    Review of Systems   Constitutional: Negative for chills and fever.   HENT: Negative for congestion and sore throat.    Respiratory: Positive for cough. Negative for shortness of breath.         Chronic cough 2/2 smoking   Cardiovascular: Negative for chest pain.   Gastrointestinal: Positive for abdominal pain and nausea. Negative for diarrhea and vomiting.   Genitourinary: Negative for difficulty urinating.   Musculoskeletal: Negative for arthralgias.   Skin: Negative for color change.   Neurological: Negative for headaches.   Psychiatric/Behavioral: Positive for agitation and behavioral problems. Negative for confusion, hallucinations and suicidal ideas.        Paranoia   All other systems reviewed and are negative.    A complete review of systems was performed with pertinent positives and negatives noted in the HPI, and all other systems negative.    Physical Exam   BP: 110/66  Pulse: 84  Temp: 98.6  F (37  C)  Resp: 16  Height: 182.9 cm (6')  Weight: (!) 169.2 kg (373 lb)  SpO2: 99 %      Physical Exam  Vitals and nursing note reviewed.   Constitutional:       Appearance: He is well-developed.      Comments: Morbidly obese   HENT:      Head: Normocephalic.   Eyes:      Pupils: Pupils are equal, round, and reactive to light.   Cardiovascular:      Rate and Rhythm: Normal rate and regular rhythm.      Heart sounds: Normal heart sounds. No  murmur heard.    No gallop.   Pulmonary:      Effort: Pulmonary effort is normal. No respiratory distress.      Breath sounds: Normal breath sounds. No wheezing or rales.   Abdominal:      General: Bowel sounds are normal. There is no distension.      Palpations: Abdomen is soft.      Tenderness: There is no abdominal tenderness. There is no guarding or rebound.      Comments: Morbidly obese, soft, nontender   Musculoskeletal:      Cervical back: Neck supple.   Skin:     General: Skin is warm and dry.   Neurological:      Mental Status: He is alert and oriented to person, place, and time.   Psychiatric:         Behavior: Behavior normal.      Comments: Flat affect, seems slightly paranoid. Looking around at people.. Does not seem to be agitated or aggressive.          ED Course     At 5:03 PM the patient was seen and examined by Isidra Ortega MD in Room EDHW01.        Procedures            The medical record was reviewed and interpreted.         Results for orders placed or performed during the hospital encounter of 04/24/22 (from the past 24 hour(s))   Urine Drugs of Abuse Screen    Narrative    The following orders were created for panel order Urine Drugs of Abuse Screen.  Procedure                               Abnormality         Status                     ---------                               -----------         ------                     Drug abuse screen 1 urin...[[id number]]  Normal              Final result                 Please view results for these tests on the individual orders.   Drug abuse screen 1 urine (ED)   Result Value Ref Range    Amphetamines Urine Screen Negative Screen Negative    Barbiturates Urine Screen Negative Screen Negative    Benzodiazepines Urine Screen Negative Screen Negative    Cannabinoids Urine Screen Negative Screen Negative    Cocaine Urine Screen Negative Screen Negative    Opiates Urine Screen Negative Screen Negative     Medications   albuterol (PROVENTIL  HFA/VENTOLIN HFA) inhaler (has no administration in time range)   DULoxetine (CYMBALTA) DR capsule 120 mg (has no administration in time range)   calcium polycarbophil (FIBERCON) tablet 625 mg (has no administration in time range)   hydrochlorothiazide (HYDRODIURIL) tablet 25 mg (has no administration in time range)   lisinopril (ZESTRIL) tablet 10 mg (has no administration in time range)   mirabegron (MYRBETRIQ) 24 hr tablet 50 mg (has no administration in time range)   naproxen sodium CAPS 220 mg (has no administration in time range)   OLANZapine (zyPREXA) half-tab 7.5 mg (has no administration in time range)   oxybutynin ER (DITROPAN-XL) 24 hr tablet 30 mg (has no administration in time range)   pantoprazole (PROTONIX) EC tablet 40 mg (has no administration in time range)   prazosin (MINIPRESS) capsule 3 mg (has no administration in time range)   Vitamin D3 (CHOLECALCIFEROL) tablet 50 mcg (has no administration in time range)             Assessments & Plan (with Medical Decision Making)   Patient presents to the ED today with c/o increasing paranoia and decompensation from his baseline psychiatric disease.  Here in the emergency department he seems somewhat guarded, slightly paranoid but is not agitated or aggressive.  He is cooperative with assessment.    He was seen by the [facility] , please see her note for full details.     At this point, given the fact that his paranoia is worsening and he appears to be decompensating from a psychiatric standpoint, we will admit him for stabilization and safety.  This was discussed with mother who is his guardian and she is agreeable to plan.    I have reviewed the nursing notes.    I have reviewed the findings, diagnosis, plan and need for follow up with the patient.    New Prescriptions    No medications on file       Final diagnoses:   Paranoia (H)   Intellectual disability       [employer] am serving as a trained medical scribe to document services personally  performed by Isidra Ortega MD, based on the provider's statements to me.      I, Isidra Ortega MD, was physically present and have reviewed and verified the accuracy of this note documented by China Mckenna.     Isidra Ortega MD  4/24/2022   [facility] EMERGENCY DEPARTMENT     Isidra Ortega MD  04/24/22 1915       Isidra Ortega MD  04/24/22 1922

## 2022-04-24 NOTE — ED NOTES
AIDET: done    Security Search: interrupted by physician interview, when interview complete psych techs on 1:1s.  Pt has titanium hip and knee replacements, and e-stim      Belongings:  1 BAG  Cellphone, Wallet    >w/pt: clothing, glasses    >locker: Wallet, tobacco tin

## 2022-04-24 NOTE — ED NOTES
"4/24/2022  Kimo Greenwood 1980     [facility] Crisis Assessment    Patient was assessed: in person  Patient location: [facility] ED     Referral Data and Chief Complaint  Pt is a 41 year old who uses male pronouns. Patient presented to the ED with family/friends, mom and was referred to the ED by family/friends mom. Patient is presenting to the ED for the following concerns: paranoia and delusions.      Informed Consent and Assessment Methods  Patient is under the guardianship of mom, Ryann [phone] and sister Traci Greenwood [phone].  Writer met with patient and spoke with guardian  and explained the crisis assessment process, including applicable information disclosures and limits to confidentiality, assessed understanding of the process, and obtained consent to proceed with the assessment. Patient was observed to be able to participate in the assessment as evidenced by actively engaged . Assessment methods included conducting a formal interview with patient, review of medical records, collaboration with medical staff, and obtaining relevant collateral information from family and community providers when available.    Narrative Summary of Presenting Problem and Current Functioning.     Pt is brought to the ED by his mom for evaluation.   He states he here because he has been calling the police too much and his mom and sister are afraid of him.  He denies any physical aggression. He does have concerns about being terminally ill.  He believes he is will and everyone is lying to him and he is really ill.   He says he has \"weird vision, disoriented, light headed when getting up, feeling weird, weak, shaky, like I am going to fall over when I am walking.\"   He believes his family is talking about him behind his back.  In the ED he believes staff are talking about him and how ill he is.  Prior to meeting with pt writer had spoken to the RN about another situation and pt questions what writer and the RN were talking " "about. He believes we were discussing him.     History of the Crisis  Pt has been seen 4x in the last the last week for medical complaints which were not founded and believed to be a part of his delusions and paranoia.      FROM DEC assessment on 4/20. Patient reported he started experiencing this \"paranoia\" about one year ago and reported he was placed on an antipsychotic medication for it and he reported that has helped him and allows him to not find the above mentioned symptoms debilitating. He indicated one time his nurse practitioner decreased his dosage of antipsychotic medications and this \"paranoia\" came back until the medication was increased again and patient reported it took about 3 weeks to one month to experience relief from the \"paranoia\" once the medication dosage was increased to original efficacy. Patient currently resides in adult foster care in [place] and his mother, Dalia, is his legal guardian, however there are an adult foster couple he lives with at the adult foster care involved as well named Paola # [phone] and Erick # [phone]. 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 Patient has history of alcohol and marijuana abuse in the past and has three previous suicide attempts by overdose in 2005, 2015 and 2018. Patient reported he was a victim of bullying in middle school and stated \"kids can be really cruel\". Patient reported he used to engage in cutting at this time but has not engaged in self injurious behaviors since then. Patient reported a desire to return to his home tonight and indicated he is feeling much better now that tests have been run on him and he is confirmed he is not terminally ill.     Collateral Information  Phone call with pts mom, Dalia [phone].   She and daughter legal guardian.  She states " that current episode of paranoia has been occurring for the last 3 months when his Olanzapine was decreased to 2.5 mg at bedtime.  When symptoms began to emerge mom contacted the provider and Olanzapine has now been increased back to baseline dose of 7.5mg daily at bedtime on 4/21.   Provider was decreasing dose because he was trying to lose weight for a hip surgery. He believes he is hearing others talking about his illness and that he is dying, that he hears others outside talking about him, that his foster care providers are talking about him and are scared of him.  He has been getting agitated when limits are set with his use of phone or calling 911. He is yelling and agitated at home.  He has been staying with mom for the last several days because the foster mom has been concerned about his agitation when he has been in their home.   He wants the family to validate that he is dying and thinks that family is lying to him. He has been to the ED x4 for various complaints in the last several weeks and these are somatic complaints and more related to his paranoia.    She states that his paranoia and delusions are affecting his judgement.  His delusions have gotten worse despite medication management and therapy. Mom says he is depressed and not doing well.     Risk Assessment  1.a. Over the past 2 weeks, have you had thoughts of killing yourself? No  1.b. Have you ever attempted to kill yourself and, if yes, when did this last happen? Yes 2018   2. Recent or current suicide plan? No   3. Recent or current intent to act on ideation? No  4. Lifetime psychiatric hospitalization? No  5. Pattern of excessive substance use? No  6. Current irritability, agitation, or aggression? No  Scoring note: BOTH 1a and 1b must be yes for it to score 1 point, if both are not yes it is zero. All others are 1 point per number. If all questions 1a/1b - 6 are no, risk is negligible. If one of 1a/1b is yes, then risk is mild. If either  question 2 or 3, but not both, is yes, then risk is automatically moderate regardless of total score. If both 2 and 3 are yes, risk is automatically high regardless of total score.      Score: 0, mild risk     The patient has the following risk factors for suicide: depressive symptoms, health stressors, chronic pain, prior suicide attempt and psychosis     Is the patient experiencing current suicidal ideation: No     Is the patient engaging in preparatory suicide behaviors (formulating how to act on plan, giving away possessions, saying goodbye, displaying dramatic behavior changes, etc)? No     Does the patient have access to firearms or other lethal means? no     The patient has the following protective factors: social support, established relationship community mental health provider(s), future focused thinking, sense of obligation to people/pets and safe/stable housing     Support system information: patient reported his mother, therapists, aunt, uncle, sister and father and step father are all near his foster home and supportive of him.     Patient strengths: patient is honest, cooperative and communicative.      Does the patient engage in non-suicidal self-injurious behavior (NSSI/SIB)? no. However, patient has a history of SIB via cutting. Pt has not engaged in SIB since middle school     Is the patient vulnerable to sexual exploitation?  Yes patient has a mild intellectual disability and could be coerced into sexual relations by not understanding     Is the patient experiencing abuse or neglect? no     Is the patient a vulnerable adult? Yes        Risk of Harm to Others  The patient has the following risk factors of harm to others: no risk factors identified     Does the patient have thoughts of harming others? No     Is the patient engaging in sexually inappropriate behavior?  no         Current Substance Abuse  Is there recent substance abuse? no     Was a urine drug screen or blood alcohol level obtained:  "Yes negative     CAGE AID  Have you felt you ought to cut down on your drinking or drug use?  No  Have people annoyed you by criticizing your drinking or drug use? No  Have you felt bad or guilty about your drinking or drug use? No  Have you ever had a drink or used drugs first thing in the morning to steady your nerves or to get rid of a hangover? No  Score: 0/4         Current Symptoms/Concerns  Symptoms  Attention, hyperactivity, and impulsivity symptoms present: No     Anxiety symptoms present: Yes: Panic attacks, Obsessions/Compulsions (counting, ritualistic behavior, needing things to be \"just so\") and Generalized Symptoms: Cognitive anxiety - feelings of doom, racing thoughts, difficulty concentrating , Excessive worry, Physiological anxiety - sweating, flushing, shaking, shortness of breath, or racing heart and Somatic symptoms - abdominal pain, headache, or tension       Appetite symptoms present: No      Behavioral difficulties present: No      Cognitive impairment symptoms present: Yes: Decision-Making and Language Disturbance     Depressive symptoms present: Yes Increased irritability/agitation and Low self esteem       Eating disorder symptoms present: No     Learning disabilities, cognitive challenges, and/or developmental disorder symptoms present: Yes: Communication and Functional Impairments      Manic/hypomanic symptoms present: No     Personality and interpersonal functioning difficulties present : No     Psychosis symptoms present: Yes: Paranoia       Sleep difficulties present: Yes: Difficulty falling asleep , Difficulty staying sleep  and Night terrors      Substance abuse disorder symptoms present: No        Mental Status Exam   Affect: Flat   Appearance: Appropriate    Attention Span/Concentration: Attentive?    Eye Contact: Engaged   Fund of Knowledge: Delayed    Language /Speech Content: Fluent   Language /Speech Volume: Normal    Language /Speech Rate/Productions: Slow    Recent Memory: " Intact   Remote Memory: Intact   Mood: Anxious    Orientation to Person: Yes    Orientation to Place: Yes   Orientation to Time of Day: Yes    Orientation to Date: Yes    Situation (Do they understand why they are here?): Yes    Psychomotor Behavior: Underactive    Thought Content: Clear and Paranoia   Thought Form: Intact and Obsessive/Perseverative         Mental Health and Substance Abuse History     History  Current and historical diagnoses or mental health concerns: 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     Prior MH services (inpatient, programmatic care, outpatient, etc) : Yes medication managament, therapy, DBT therapy and day programming.     Has the patient used [facility] crisis team services before?: No     History of substance abuse: Yes alcohol and marijuana     Prior CRYSTAL services (inpatient, programmatic care, detox, outpatient, etc) : No     History of commitment: No     Family history of MH/CRYSTAL: Yes depression and alcoholism and anxiety     Trauma history: Yes bullying in middle school      Medication  Psychotropic medications: Yes. Pt is currently taking prazosin, duloxetine, olanzapine. Medication compliant: Yes. Recent medication changes: Yes olanzapine was decreased from 7.5mg to 5mg for the second time which created paranoia to increase on both occassions so today olanzapine was increased back to 7.5mg     Current Care Team  Primary Care Provider: Yes. Name: Kory Hudson MD. Location: unknown. Date of last visit: unknown. Frequency: unknown. Perceived helpfulness: helpful.     Psychiatrist: Yes. Name: Traci Crespo. Location: [place]. Date of last visit: 3/16/2022. Frequency: 1 time a month. Perceived helpfulness: helpful.     Therapist: Yes. Name: Kulwant Hernandezx2/wk and Kathrynx1/wk. Location: [place]. Date of last visit: unknown. Frequency: see before. Perceived helpfulness: helpful.     : Yes. Name: Crys. Location:  [facility] [phone]. Date of last visit: unknown. Frequency: unknown. Perceived helpfulness: unknown.     CTSS or ARMHS: No     ACT Team: No     Other: No     Release of Information  Was a release of information signed: No. Reason: no guardian present        Biopsychosocial Information     Socioeconomic Information  Current living situation: adult foster home in [place] Foster care parents Paola # [phone] and Erick # [phone]. He has lived here for 2 months. Prior to that he was in a crisis home and before that in his own apartment.      Employment/income source: disabled     Relevant legal issues: denied     Cultural, Hindu, or spiritual influences on mental health care: denied     Is the patient active in the  or a : No        Relevant Medical Concerns   Patient identifies concerns with completing ADLs? No      Patient can ambulate independently? Yes      Other medical concerns? Yes      History of concussion or TBI? Yes in 2005        Diagnosis  F33.3 Major Depressive Disorder recurrent with psychotic features recurrent  F41.1 Generalized Anxiety Disorder  F70.0 Mild Developmental Disability  F43.22 Adjustment Disorder with anxiety    Therapeutic Intervention  The following therapeutic methodologies were employed when working with the patient: establishing rapport, active listening, assessing dimensions of crisis and treatment planning. Patient response to intervention: engaged with writer.    Disposition  Recommended disposition: Inpatient Mental Health      Reviewed case and recommendations with attending provider. Attending Name: Dr. Ortega       Attending concurs with disposition: Yes      Patient concurs with disposition: Yes      Guardian concurs with disposition: Yes     Final disposition: Inpatient mental health .     Inpatient Details (if applicable):  Is patient admitted voluntarily:Yes, per guardian    Patient aware of potential for transfer if there is not  appropriate placement? Yes     [facility]      Behavioral Intake Notified? Yes: Date: 4/24@braydon  Kaushikmaria e.        Clinical Substantiation of Recommendations   Pt presents with increased paranoia and delusions despite efforts to stabilize him as an outpatient. His insight and judgement are impaired due to his paranoid and delusional thoughts. Inpatient is recommended for safety and stabilization.     Assessment Details  Patient interview started at: 630p and completed at: 700p    Total duration spent on the patient case in minutes: .50 hrs     CPT code(s) utilized: 36797 - Psychotherapy for Crisis - 60 (30-74*) min       Trista Naik, [facility] In[facility] HospitalSW

## 2022-04-25 ENCOUNTER — TELEPHONE (OUTPATIENT)
Dept: BEHAVIORAL HEALTH | Facility: CLINIC | Age: 42
End: 2022-04-25

## 2022-04-25 ENCOUNTER — HOSPITAL ENCOUNTER (INPATIENT)
Facility: CLINIC | Age: 42
LOS: 16 days | Discharge: HOME | DRG: 885 | End: 2022-05-11
Attending: PSYCHIATRY & NEUROLOGY | Admitting: PSYCHIATRY & NEUROLOGY
Payer: COMMERCIAL

## 2022-04-25 ENCOUNTER — TELEPHONE (OUTPATIENT)
Dept: BEHAVIORAL HEALTH | Facility: CLINIC | Age: 42
End: 2022-04-25
Payer: COMMERCIAL

## 2022-04-25 VITALS
TEMPERATURE: 97.5 F | HEIGHT: 72 IN | OXYGEN SATURATION: 97 % | SYSTOLIC BLOOD PRESSURE: 139 MMHG | WEIGHT: 315 LBS | RESPIRATION RATE: 16 BRPM | DIASTOLIC BLOOD PRESSURE: 78 MMHG | BODY MASS INDEX: 42.66 KG/M2 | HEART RATE: 87 BPM

## 2022-04-25 DIAGNOSIS — E66.01 MORBID OBESITY (H): ICD-10-CM

## 2022-04-25 DIAGNOSIS — M25.552 HIP PAIN, LEFT: Primary | ICD-10-CM

## 2022-04-25 DIAGNOSIS — F33.3 SEVERE RECURRENT MAJOR DEPRESSIVE DISORDER WITH PSYCHOTIC FEATURES (H): ICD-10-CM

## 2022-04-25 DIAGNOSIS — R45.86 MOOD CHANGE: ICD-10-CM

## 2022-04-25 PROCEDURE — 128N000001 HC R&B CD/MH ADULT

## 2022-04-25 PROCEDURE — 250N000013 HC RX MED GY IP 250 OP 250 PS 637: Performed by: PSYCHIATRY & NEUROLOGY

## 2022-04-25 PROCEDURE — 250N000013 HC RX MED GY IP 250 OP 250 PS 637: Performed by: EMERGENCY MEDICINE

## 2022-04-25 RX ORDER — HYDROCHLOROTHIAZIDE 25 MG/1
25 TABLET ORAL DAILY
Status: DISCONTINUED | OUTPATIENT
Start: 2022-04-26 | End: 2022-05-11 | Stop reason: HOSPADM

## 2022-04-25 RX ORDER — OLANZAPINE 10 MG/2ML
10 INJECTION, POWDER, FOR SOLUTION INTRAMUSCULAR 3 TIMES DAILY PRN
Status: DISCONTINUED | OUTPATIENT
Start: 2022-04-25 | End: 2022-05-11 | Stop reason: HOSPADM

## 2022-04-25 RX ORDER — TRAZODONE HYDROCHLORIDE 50 MG/1
50 TABLET, FILM COATED ORAL
Status: DISCONTINUED | OUTPATIENT
Start: 2022-04-25 | End: 2022-05-11 | Stop reason: HOSPADM

## 2022-04-25 RX ORDER — CALCIUM POLYCARBOPHIL 625 MG 625 MG/1
1250 TABLET ORAL 2 TIMES DAILY
Status: DISCONTINUED | OUTPATIENT
Start: 2022-04-26 | End: 2022-05-11 | Stop reason: HOSPADM

## 2022-04-25 RX ORDER — VITAMIN B COMPLEX
50 TABLET ORAL DAILY
Status: DISCONTINUED | OUTPATIENT
Start: 2022-04-26 | End: 2022-05-11 | Stop reason: HOSPADM

## 2022-04-25 RX ORDER — LISINOPRIL 10 MG/1
10 TABLET ORAL EVERY MORNING
Status: DISCONTINUED | OUTPATIENT
Start: 2022-04-26 | End: 2022-05-11 | Stop reason: HOSPADM

## 2022-04-25 RX ORDER — OXYBUTYNIN CHLORIDE 5 MG/1
30 TABLET, EXTENDED RELEASE ORAL DAILY
Status: DISCONTINUED | OUTPATIENT
Start: 2022-04-26 | End: 2022-04-26

## 2022-04-25 RX ORDER — MIRABEGRON 50 MG/1
50 TABLET, EXTENDED RELEASE ORAL DAILY
Status: DISCONTINUED | OUTPATIENT
Start: 2022-04-26 | End: 2022-04-26

## 2022-04-25 RX ORDER — MAGNESIUM HYDROXIDE/ALUMINUM HYDROXICE/SIMETHICONE 120; 1200; 1200 MG/30ML; MG/30ML; MG/30ML
30 SUSPENSION ORAL EVERY 4 HOURS PRN
Status: DISCONTINUED | OUTPATIENT
Start: 2022-04-25 | End: 2022-05-11 | Stop reason: HOSPADM

## 2022-04-25 RX ORDER — OLANZAPINE 10 MG/1
10 TABLET ORAL 3 TIMES DAILY PRN
Status: DISCONTINUED | OUTPATIENT
Start: 2022-04-25 | End: 2022-05-11 | Stop reason: HOSPADM

## 2022-04-25 RX ORDER — PANTOPRAZOLE SODIUM 40 MG/1
40 TABLET, DELAYED RELEASE ORAL
Status: DISCONTINUED | OUTPATIENT
Start: 2022-04-26 | End: 2022-05-11 | Stop reason: HOSPADM

## 2022-04-25 RX ORDER — PRAZOSIN HYDROCHLORIDE 1 MG/1
3 CAPSULE ORAL AT BEDTIME
Status: DISCONTINUED | OUTPATIENT
Start: 2022-04-25 | End: 2022-05-11 | Stop reason: HOSPADM

## 2022-04-25 RX ORDER — NAPROXEN SODIUM 220 MG
440 TABLET ORAL 2 TIMES DAILY WITH MEALS
Status: DISCONTINUED | OUTPATIENT
Start: 2022-04-26 | End: 2022-05-11 | Stop reason: HOSPADM

## 2022-04-25 RX ORDER — ALBUTEROL SULFATE 90 UG/1
2 AEROSOL, METERED RESPIRATORY (INHALATION) EVERY 6 HOURS PRN
Status: DISCONTINUED | OUTPATIENT
Start: 2022-04-25 | End: 2022-05-11 | Stop reason: HOSPADM

## 2022-04-25 RX ORDER — HYDROXYZINE HYDROCHLORIDE 25 MG/1
25 TABLET, FILM COATED ORAL EVERY 4 HOURS PRN
Status: DISCONTINUED | OUTPATIENT
Start: 2022-04-25 | End: 2022-05-11 | Stop reason: HOSPADM

## 2022-04-25 RX ORDER — DULOXETIN HYDROCHLORIDE 60 MG/1
120 CAPSULE, DELAYED RELEASE ORAL DAILY
Status: DISCONTINUED | OUTPATIENT
Start: 2022-04-26 | End: 2022-05-11 | Stop reason: HOSPADM

## 2022-04-25 RX ORDER — AMOXICILLIN 250 MG
1 CAPSULE ORAL 2 TIMES DAILY PRN
Status: DISCONTINUED | OUTPATIENT
Start: 2022-04-25 | End: 2022-05-11 | Stop reason: HOSPADM

## 2022-04-25 RX ORDER — ACETAMINOPHEN 325 MG/1
650 TABLET ORAL EVERY 4 HOURS PRN
Status: DISCONTINUED | OUTPATIENT
Start: 2022-04-25 | End: 2022-05-11 | Stop reason: HOSPADM

## 2022-04-25 RX ADMIN — LISINOPRIL 10 MG: 10 TABLET ORAL at 10:28

## 2022-04-25 RX ADMIN — Medication 50 MCG: at 10:31

## 2022-04-25 RX ADMIN — HYDROCHLOROTHIAZIDE 25 MG: 25 TABLET ORAL at 10:28

## 2022-04-25 RX ADMIN — CALCIUM POLYCARBOPHIL 1250 MG: 625 TABLET, FILM COATED ORAL at 10:26

## 2022-04-25 RX ADMIN — DULOXETINE HYDROCHLORIDE 120 MG: 30 CAPSULE, DELAYED RELEASE ORAL at 11:12

## 2022-04-25 RX ADMIN — PANTOPRAZOLE SODIUM 40 MG: 40 TABLET, DELAYED RELEASE ORAL at 10:31

## 2022-04-25 RX ADMIN — MIRABEGRON 50 MG: 25 TABLET, FILM COATED, EXTENDED RELEASE ORAL at 10:29

## 2022-04-25 RX ADMIN — NAPROXEN 500 MG: 250 TABLET ORAL at 10:29

## 2022-04-25 RX ADMIN — PRAZOSIN HYDROCHLORIDE 3 MG: 1 CAPSULE ORAL at 21:15

## 2022-04-25 RX ADMIN — OLANZAPINE 7.5 MG: 5 TABLET, FILM COATED ORAL at 21:14

## 2022-04-25 RX ADMIN — OXYBUTYNIN CHLORIDE 30 MG: 10 TABLET, FILM COATED, EXTENDED RELEASE ORAL at 10:30

## 2022-04-25 ASSESSMENT — ACTIVITIES OF DAILY LIVING (ADL)
TOILETING_ISSUES: NO
HEARING_DIFFICULTY_OR_DEAF: NO
WEAR_GLASSES_OR_BLIND: NO
CHANGE_IN_FUNCTIONAL_STATUS_SINCE_ONSET_OF_CURRENT_ILLNESS/INJURY: NO
DIFFICULTY_EATING/SWALLOWING: NO
FALL_HISTORY_WITHIN_LAST_SIX_MONTHS: NO
NUMBER_OF_TIMES_PATIENT_HAS_FALLEN_WITHIN_LAST_SIX_MONTHS: 0
DOING_ERRANDS_INDEPENDENTLY_DIFFICULTY: YES
WALKING_OR_CLIMBING_STAIRS_DIFFICULTY: NO
HYGIENE/GROOMING: INDEPENDENT
ORAL_HYGIENE: INDEPENDENT
DRESS: INDEPENDENT
CONCENTRATING,_REMEMBERING_OR_MAKING_DECISIONS_DIFFICULTY: NO
DRESSING/BATHING_DIFFICULTY: NO
DIFFICULTY_COMMUNICATING: NO

## 2022-04-25 NOTE — SAFE
Kimo Greenwood  April 24, 2022    SAFE Note    Critical Safety Issues:       Current Suicidal Ideation/Self-Injurious Concerns/Methods: None - N/A      Current or Historical Inappropriate Sexual Behavior: No      Current or Historical Aggression/Homicidal Ideation: None - N/A      Triggers: paranoid, delusional thoughts     Updated care team: Yes: ED RN and MD     For additional details see full LM[facility] assessment.       Trista Naik, [facility] Hospital[facility]

## 2022-04-25 NOTE — ED NOTES
Pt has been calm and cooperative all during shift.  Pt is able to make his needs known, uses call light appropriately, asks for comfort measures.  Pt able to independently ambulate and use his CPAP machine.

## 2022-04-25 NOTE — TELEPHONE ENCOUNTER
R:  Patient cleared and ready for behavioral bed placement: Yes     Dr Pizarro called @ 1:50pm to present for 4500/CNP Akein  Dr Pizarro accepted  @ 1:55pm  Pt placed in queue and unit called with disposition @ 2pm  [facility]  ED Updated with placement @ 2:16pm   Report  4pm

## 2022-04-25 NOTE — PHARMACY-ADMISSION MEDICATION HISTORY
Admission medication history completed at [facility]. Please see Pharmacy - Admission Medication History note from 04/24/2022.    Matt Hudson RPH on 4/25/2022 at 6:59 PM

## 2022-04-25 NOTE — ED NOTES
Pt informed writer he uses a CPAP machine at home and his mother is unable to bring his here tonight.  RT has been called to obtain a CPAP for Pt.

## 2022-04-25 NOTE — PHARMACY-ADMISSION MEDICATION HISTORY
"  Admission Medication History Completed by Pharmacy    See [facility] Admission Navigator for allergy information, preferred outpatient pharmacy, prior to admission medications and immunization status.     Medication History Sources:     Surescripts (fill history), CareEverywhere, and patient's mother (via telephone)    Changes made to PTA medication list (reason):    Added: None    Deleted: None    Changed: per mother  o Added indication of \"hip pain\" to lidocaine patch per home use  o Saxenda new start titration --> per mother, takes 3 mg subcutaneously daily.    Additional Information:    None    Prior to Admission medications    Medication Sig Last Dose Taking? Auth Provider   albuterol (VENTOLIN HFA) 108 (90 Base) MCG/ACT inhaler INHALE 2 PUFFS INTO LUNGS EVERY SIX HOURS AS NEEDED FOR SHORTNESS OF BREATH / DYSPNEA OR WHEEZING Past Week at Unknown time Yes Kory Hudson MD   DULoxetine (CYMBALTA) 60 MG capsule Take 120 mg by mouth daily  4/24/2022 at 1000 Yes Reported, Patient   FIBER- MG tablet TAKE 2 TABLETS BY MOUTH TWICE A DAY 4/24/2022 at 1000 Yes Kory Hudson MD   hydrochlorothiazide (HYDRODIURIL) 25 MG tablet Take 1 tablet (25 mg) by mouth daily 4/24/2022 at 1000 Yes Kory Hudson MD   Lidocaine (LIDOCARE) 4 % Patch Place 1-2 patches onto the skin every 24 hours To prevent lidocaine toxicity, patient should be patch free for 12 hrs daily.   Past Week at Unknown time Yes Kory Hudson MD   liraglutide - Weight Management (SAXENDA) 18 MG/3ML pen Inject 3 mg subcutaneously daily. 4/24/2022 at 1000 Yes Kory Hudson MD   lisinopril (ZESTRIL) 10 MG tablet TAKE 1 TABLET BY MOUTH EVERY MORNING 4/24/2022 at 1000 Yes Kory Hudson MD   mirabegron (MYRBETRIQ) 50 MG 24 hr tablet Take 1 tablet by mouth daily 4/24/2022 at 1000 Yes Reported, Patient   naproxen sodium 220 MG capsule Take 440 mg by mouth 2 times daily (with meals) 4/24/2022 at 1000 Yes Kory Hudson MD   OLANZapine " (ZYPREXA) 5 MG tablet Take 7.5 mg by mouth At Bedtime 4/23/2022 at Unknown time Yes Reported, Patient   oxybutynin ER (DITROPAN XL) 15 MG 24 hr tablet Take 30 mg by mouth daily 4/24/2022 at 1000 Yes Unknown, Entered By History   pantoprazole (PROTONIX) 40 MG EC tablet TAKE 1 TABLET BY MOUTH 30-60 MINUTES BEFORE FIRST MEAL OF THE DAY 4/24/2022 at 1000 Yes Kory Hudson MD   prazosin (MINIPRESS) 1 MG capsule Take 3 mg by mouth At Bedtime 4/23/2022 at Unknown time Yes Unknown, Entered By History   VITAMIN D3 50 MCG (2000 UT) tablet TAKE ONE TABLET BY MOUTH EVERY DAY 4/24/2022 at 1000 Yes Kory Hudson MD       Date completed: 04/24/22    Medication history completed by:    Nicollette McMann, PharmD  [facility]  Emergency Department: Ascom *[id number]

## 2022-04-25 NOTE — TELEPHONE ENCOUNTER
R:  Unsure how far guardians wish to seek placement for placement. Bed search update @ 03:35:    [facility] @ cap  [facility] @ cap  [facility] @ cap  [facility]: @ cap per website  [facility]:@ cap per website  [facility]: @ cap per website  [facility]: @ cap per website  [facility]: @ cap per website  [facility]: @ cap per website  [facility]: @ cap per website  [facility]: Posting 2 beds. Mixed unit/Low acuity. Per Traci @ 03:35, they do have beds available. Intake unsure if guardian wishes to travel this far - Informed Traci this writer will call back after checking w/ ED.    Called ED @ 03:38 re: preferred placement travel distance. RN busy - requested a callback when able.    RN informed this writer @ 04:08 that DEC assessment [place] - no clear distinction of how far. [facility] is [place] of [place].     Called [facility] @ 4:16 and Traci said she is able to take clinical however she is currently reviewing 2 other referrals at this time. Faxed clinical @ 04:29.     Awaiting callback from [facility].     Traci from [facility] called @ 06:50 to report that pt was declined for admission d/t pt being too acute for their facility.     Intake to assess alternative placement options. Pt remains on wait list pending appropriate bed availability.

## 2022-04-25 NOTE — ED TRIAGE NOTES
Pt's NP slowly weaning Olanzepine x 3 months but lately pt's Mother noted increase in paranoia, pt has been calling 911 multiple times. Pt thinks he is dying and that everyone is lying to him. NP has increased the Olanzepine back to 7.5 mg but pt is still not psychiatrically stable. Pt and pt's Mother requesting inpatient admission

## 2022-04-25 NOTE — ED NOTES
[facility] ED Mental Health Handoff Note:       Brief HPI:  This is a 41 year old male signed out to me.  See initial ED Provider note for full details of the presentation.     Home meds reviewed and ordered/administered: Yes    Medically stable for inpatient mental health admission: Yes.    Evaluated by mental health: Yes. The recommendation is for inpatient mental health treatment. Bed search in process    Safety concerns: At the time I received sign out, there were no safety concerns.    Hold Status:  Active Orders   N/A   Mother has legal guardianship and wants patient admitted.    Exam:   Patient Vitals for the past 24 hrs:   BP Temp Temp src Pulse Resp SpO2 Height Weight   04/25/22 0800 -- 97.4  F (36.3  C) Oral -- 14 -- -- --   04/25/22 0051 114/63 98.2  F (36.8  C) Oral 79 16 95 % -- --   04/24/22 2217 121/67 98  F (36.7  C) Oral 77 16 99 % -- --   04/24/22 2213 121/67 -- -- -- -- -- -- --   04/24/22 1945 131/70 98.1  F (36.7  C) Oral 84 18 98 % -- --   04/24/22 1651 110/66 98.6  F (37  C) Oral 84 16 99 % 1.829 m (6') (!) 169.2 kg (373 lb)       ED Course:    Medications   albuterol (PROVENTIL HFA/VENTOLIN HFA) inhaler (has no administration in time range)   DULoxetine (CYMBALTA) DR capsule 120 mg (120 mg Oral Given 4/25/22 1112)   hydrochlorothiazide (HYDRODIURIL) tablet 25 mg (25 mg Oral Given 4/25/22 1028)   lisinopril (ZESTRIL) tablet 10 mg (10 mg Oral Given 4/25/22 1028)   mirabegron (MYRBETRIQ) 24 hr tablet 50 mg (50 mg Oral Given 4/25/22 1029)   naproxen (NAPROSYN) tablet 500 mg (500 mg Oral Given 4/25/22 1029)   OLANZapine (zyPREXA) half-tab 7.5 mg (7.5 mg Oral Given 4/24/22 2212)   oxybutynin ER (DITROPAN-XL) 24 hr tablet 30 mg (30 mg Oral Given 4/25/22 1030)   pantoprazole (PROTONIX) EC tablet 40 mg (40 mg Oral Given 4/25/22 1031)   prazosin (MINIPRESS) capsule 3 mg (3 mg Oral Given 4/24/22 2213)   Vitamin D3 (CHOLECALCIFEROL) tablet 50 mcg (50 mcg Oral Given 4/25/22 1031)   nicotine  (COMMIT) lozenge 2 mg (2 mg Buccal Given 4/24/22 2003)   calcium polycarbophil (FIBERCON) tablet 1,250 mg (1,250 mg Oral Given 4/25/22 1026)            There were no significant events during my shift.      Impression:    ICD-10-CM    1. Paranoia (H)  F22    2. Intellectual disability  F79        Plan:    1. Awaiting inpatient mental health admission/transfer.   2. Patient has been accepted to [facility]. He will be transported there via ambulance. Mother is legal guardian and consents to the admission.      RESULTS:   Results for orders placed or performed during the hospital encounter of 04/24/22 (from the past 24 hour(s))   Urine Drugs of Abuse Screen     Status: Normal    Collection Time: 04/24/22  5:00 PM    Narrative    The following orders were created for panel order Urine Drugs of Abuse Screen.  Procedure                               Abnormality         Status                     ---------                               -----------         ------                     Drug abuse screen 1 urin...[[id number]]  Normal              Final result                 Please view results for these tests on the individual orders.   Drug abuse screen 1 urine (ED)     Status: Normal    Collection Time: 04/24/22  5:00 PM   Result Value Ref Range    Amphetamines Urine Screen Negative Screen Negative    Barbiturates Urine Screen Negative Screen Negative    Benzodiazepines Urine Screen Negative Screen Negative    Cannabinoids Urine Screen Negative Screen Negative    Cocaine Urine Screen Negative Screen Negative    Opiates Urine Screen Negative Screen Negative   Asymptomatic COVID-19 Virus (Coronavirus) by PCR Nasopharyngeal     Status: Normal    Collection Time: 04/24/22  6:43 PM    Specimen: Nasopharyngeal; Swab   Result Value Ref Range    SARS CoV2 PCR Negative Negative    Narrative    Testing was performed using the deangelo  SARS-CoV-2 & Influenza A/B Assay on the deangelo  Claudine  System.  This test should be ordered for the  detection of SARS-COV-2 in individuals who meet SARS-CoV-2 clinical and/or epidemiological criteria. Test performance is unknown in asymptomatic patients.  This test is for in vitro diagnostic use under the FDA EUA for laboratories certified under CLIA to perform moderate and/or high complexity testing. This test has not been FDA cleared or approved.  A negative test does not rule out the presence of PCR inhibitors in the specimen or target RNA in concentration below the limit of detection for the assay. The possibility of a false negative should be considered if the patient's recent exposure or clinical presentation suggests COVID-19.  [facility] are certified under the Clinical Laboratory Improvement Amendments of 1988 (CLIA-88) as qualified to perform moderate and/or high complexity laboratory testing.             MD Rom Montana Dung Hoang, MD  04/25/22 1451

## 2022-04-26 PROBLEM — F33.3 SEVERE RECURRENT MAJOR DEPRESSIVE DISORDER WITH PSYCHOTIC FEATURES (H): Status: ACTIVE | Noted: 2021-10-06

## 2022-04-26 PROCEDURE — 94660 CPAP INITIATION&MGMT: CPT

## 2022-04-26 PROCEDURE — 250N000013 HC RX MED GY IP 250 OP 250 PS 637: Performed by: NURSE PRACTITIONER

## 2022-04-26 PROCEDURE — 99223 1ST HOSP IP/OBS HIGH 75: CPT | Performed by: NURSE PRACTITIONER

## 2022-04-26 PROCEDURE — 128N000001 HC R&B CD/MH ADULT

## 2022-04-26 PROCEDURE — 250N000013 HC RX MED GY IP 250 OP 250 PS 637: Performed by: PSYCHIATRY & NEUROLOGY

## 2022-04-26 PROCEDURE — 999N000157 HC STATISTIC RCP TIME EA 10 MIN

## 2022-04-26 RX ORDER — MIRABEGRON 25 MG/1
50 TABLET, FILM COATED, EXTENDED RELEASE ORAL DAILY
Status: DISCONTINUED | OUTPATIENT
Start: 2022-04-26 | End: 2022-05-11 | Stop reason: HOSPADM

## 2022-04-26 RX ORDER — OXYBUTYNIN CHLORIDE 10 MG/1
30 TABLET, EXTENDED RELEASE ORAL DAILY
Status: DISCONTINUED | OUTPATIENT
Start: 2022-04-26 | End: 2022-04-26

## 2022-04-26 RX ORDER — OLANZAPINE 10 MG/1
10 TABLET, ORALLY DISINTEGRATING ORAL AT BEDTIME
Status: DISCONTINUED | OUTPATIENT
Start: 2022-04-27 | End: 2022-05-11 | Stop reason: HOSPADM

## 2022-04-26 RX ORDER — OXYBUTYNIN CHLORIDE 10 MG/1
30 TABLET, EXTENDED RELEASE ORAL DAILY
Status: DISCONTINUED | OUTPATIENT
Start: 2022-04-26 | End: 2022-05-11 | Stop reason: HOSPADM

## 2022-04-26 RX ADMIN — LISINOPRIL 10 MG: 10 TABLET ORAL at 08:43

## 2022-04-26 RX ADMIN — Medication 50 MCG: at 08:42

## 2022-04-26 RX ADMIN — HYDROCHLOROTHIAZIDE 25 MG: 25 TABLET ORAL at 08:43

## 2022-04-26 RX ADMIN — NAPROXEN SODIUM 440 MG: 220 TABLET, FILM COATED ORAL at 08:43

## 2022-04-26 RX ADMIN — CALCIUM POLYCARBOPHIL 1250 MG: 625 TABLET, FILM COATED ORAL at 20:50

## 2022-04-26 RX ADMIN — NAPROXEN SODIUM 440 MG: 220 TABLET, FILM COATED ORAL at 17:39

## 2022-04-26 RX ADMIN — OLANZAPINE 7.5 MG: 5 TABLET, FILM COATED ORAL at 20:50

## 2022-04-26 RX ADMIN — DULOXETINE 120 MG: 60 CAPSULE, DELAYED RELEASE ORAL at 08:42

## 2022-04-26 RX ADMIN — PRAZOSIN HYDROCHLORIDE 3 MG: 1 CAPSULE ORAL at 20:50

## 2022-04-26 RX ADMIN — PANTOPRAZOLE SODIUM 40 MG: 40 TABLET, DELAYED RELEASE ORAL at 08:43

## 2022-04-26 RX ADMIN — CALCIUM POLYCARBOPHIL 1250 MG: 625 TABLET, FILM COATED ORAL at 08:43

## 2022-04-26 ASSESSMENT — ACTIVITIES OF DAILY LIVING (ADL)
DRESS: INDEPENDENT
ORAL_HYGIENE: INDEPENDENT
ORAL_HYGIENE: INDEPENDENT
LAUNDRY: UNABLE TO COMPLETE
DRESS: INDEPENDENT
HYGIENE/GROOMING: INDEPENDENT
HYGIENE/GROOMING: INDEPENDENT

## 2022-04-26 NOTE — PLAN OF CARE
"Goal Outcome Evaluation:    Plan of Care Reviewed With: patient        Problem: Behavioral Health Plan of Care  Goal: Adheres to Safety Considerations for Self and Others  Outcome: Ongoing, Progressing  Intervention: Develop and Maintain Individualized Safety Plan  Recent Flowsheet Documentation  Taken 4/26/2022 0900 by Madhuri Babcock RN  Safety Measures: safety rounds completed     Problem: Behavioral Health Plan of Care  Goal: Absence of New-Onset Illness or Injury  Intervention: Identify and Manage Fall Risk  Recent Flowsheet Documentation  Taken 4/26/2022 0900 by Madhuri Babcock RN  Safety Measures: safety rounds completed     Patient met still sleeping. Patient on medical bed due to CPAP use. Patient was easily aroused. Flat blunted affect noted. Was agreeable to vital signs check which were within defined limit. Patient denied anxiety and depression. Patient denied SI/HI and hallucinations. He was partially compliant with his morning medications. Refused to take his blood pressure medications, Lisinopril and hydrochlorothiazide. Vital signs within defined limit.    Patient woken up for lunch. Patient walked down to the dinning room and came back without eating. Stated that he did not want to eat. Came back to ask the RN how he can be discharged since he came in here voluntarily. RN informed patient that the provider will see him soon. He was encouraged to eat because that is one of the things the provider will put into consideration for his discharge. Patient stated, \"I go eat then.\" Patient got numbers out of his phone and made phone calls after lunch before going back to sleep. No behavior outburst noted.      "

## 2022-04-26 NOTE — PLAN OF CARE
Problem: Behavioral Health Plan of Care  Goal: Plan of Care Review  Outcome: Ongoing, Progressing  Goal: Adheres to Safety Considerations for Self and Others  Intervention: Develop and Maintain Individualized Safety Plan  Recent Flowsheet Documentation  Taken 4/26/2022 0624 by Marlen Frank RN  Safety Measures: safety rounds completed  Goal: Absence of New-Onset Illness or Injury  Intervention: Identify and Manage Fall Risk  Recent Flowsheet Documentation  Taken 4/26/2022 0624 by Marlen Frank RN  Safety Measures: safety rounds completed     Problem: Sleep Disturbance  Goal: Adequate Sleep/Rest  Outcome: Ongoing, Progressing   Goal Outcome Evaluation:    As at 0600 ~Pt appeared sleeping  for  more than 7 hours this shift  Respirations are even and unlabored.  Safety checks completed throughout the night per protocol.   No discomfort or pain verbalized this shift  Pt is on suicide precaution.   No harm to self and others noted or reported this shift.   No SI/HI or hallucination noted or reported this shift  Will continue to monitor and assist as needed.

## 2022-04-26 NOTE — PROGRESS NOTES
"Formal consult to follow in the morning after discussion with primary provider, Alexi Bolaños CNP. To assess PTA med rec and assess for physical problems. VS reviewed and BP in 120-130s/50-60s. Per provider, pt not wanting to take BP med because his BP is \"too low.\" Would prefer if pt got the med, but a missed dose will not increase BP greatly as trends in 110-120s/60s. Labs reviewed and K at 3.5. MIld WBC elevated at 11.1, coming down from 13.1 on 4/16/22, pt has had two ED visits prior to this one. No concern for an acute infection. Can trend in the morning. Hx of leukocytosis.Will reorder in the morning. Med rec looked over and all meds PTA ordered per primary team, not acute issues currently.  - check K, WBC in AM  - formal consult to follow in the morning.    LAMBERT Sandoval, CNP  Hospitalist Service  [facility]         "

## 2022-04-26 NOTE — PLAN OF CARE
Problem: Behavioral Health Plan of Care  Goal: Plan of Care Review  Recent Flowsheet Documentation  Taken 4/25/2022 1925 by Rupal Forrest, RN  Plan of Care Reviewed With: patient  Patient Agreement with Plan of Care: agrees     Problem: Behavioral Health Plan of Care  Goal: Optimal Comfort and Wellbeing  Outcome: Ongoing, Progressing     Problem: Behavioral Health Plan of Care  Goal: Optimized Coping Skills in Response to Life Stressors  Intervention: Promote Effective Coping Strategies  Recent Flowsheet Documentation  Taken 4/25/2022 1925 by Rupal Forrest, RN  Supportive Measures:    active listening utilized    positive reinforcement provided    verbalization of feelings encouraged   Goal Outcome Evaluation:    Plan of Care Reviewed With: patient     Pt was admitted from [facility].  Pt was cooperative with admission assessment.  Pt stated that he had fear of being terminally ill and that he thinks people are talking about him.  He had a shower at bedtime.  Pt was med compliant.  There was no S.I behavior observed.  Upon assessment staff noted that pt needed a medical bed due to sleep apnea and pt uses cpap.

## 2022-04-26 NOTE — PLAN OF CARE
"    Initial Psychosocial Assessment    Type of CM visit: Initial Assessment, Clinical Treatment Coordinator Role Introduction, Offer Discharge Planning    Information obtained from: [x]Patient   [x]Chart review  []Collateral Contacts  []Court Website    Hospitalization information:   Kimo Greenwood is a 41 year old who was admitted to unit 4500on 4/25/2022 due to Paranioa and delusions chart notes : been seen 4x in the last the last week for medical complaints which were not founded and believed to be a part of his delusions and paranoia.    Patient Self-Assessment  Patient reported reason for admission: \"chest pains, hands legs tingling and stomach pain.\"     Patient reported symptoms of concern: []sadness    []anxiety     []anger    []poor sleep     []medications not working    []racing thoughts     []substance use     []agitation     []hearing voices     []hopelessness   []Eating concerns    []Self-injury      [x] Other   Comments:    Current suicidal ideation:  [x]No    []Yes, no plan     []Yes, with plan (describe):          Comments:   Current homicidal ideation:  [x]No   [] Yes       Comments:     Legal Status at Admission: Voluntary/Patient has signed consent for treatment      History of Mental Health:  Describe current and past mental health symptoms present?    MDD -  Over the last year Paranoia/delusions regarding terminal illness   mild developmental disability 3 previous SA/SIB in 2005, 2015 and 2018        Do you understand your mental health diagnosis? YES []   NO [x]     History of psychiatric hospitalizations?  YES [x]     NO  []  Details: 2005, 2015 and 2018   If YES, within the last 30 days? YES []     NO  []    History of commitment?  YES []     NO  [x]    Details:   History of ECT?  YES []     NO  [x]    Details:     History of Substance Use Disorder:  Have you used alcohol or substances in the past 12 months? YES []/ NO [x]              If Yes, Type  Frequency     Would you like a substance " use disorder evaluation? YES [] / NO [x]    Previous Treatment? YES []/ NO [x]  Details:     Significant Life Events  (Illness, Death, Loss):   SHARI    Is there a history of abuse or psychological trauma:    [x]Denies       []Yes, present (type):         []Yes, past (type):        []Patient declined to answer    Identify current stressors:    []financial,    []legal issues,    []homelessness,    []housing,     []recent loss,    []relationships,    []substance use concerns,    []medical     []unemployment     []employment  concerns    []isolation,    []lack of resources,     []out of home placements,     []parenting issues     []domestic violence     []other:  Comments:       Living Situation:     []House/apt    []Group Home    []IRTS     []Homeless     []Assisted Living     []Nursing home    []Lives alone    []Lives with :                         [x]Other:   Adult foster care     Family Composition: Self/ mom/sister/foster parents    Children, ages and current location if minor: none reported     Relationship status  []Single     []     []     []       []Significant Other   []Other:     Educational Background:  []Less Than High School     [x]High School     []GED     []College       Cognitive/learning concerns: mild cognitive employment    Financial Status: [] Employed, status and location:  []Unemployment    []County Assistance     [x]SSI/SSDI      []Waivered services    []Other:    Legal status(present):   []Voluntary, []72-hour hold, []Commitment, [x]Guardianship, []Revocation, []Stay of commitment,    Details:    Other legal issues identified:  [x]None, []Arrest,  []Probation/[place],  []Driving under influence,  []Incarceration,  []Sexual offense (level):   []Child Protective Services,      []Other:      Details:    Ethnic/Cultural considerations:  41 year old white cisgender male    Spiritual considerations: none reported     Service History:  [x]No     []Yes: details:    Social  "Functioning (organization, interests) and strengths: Watching TV    Current Treatment Providers Are:     NO Name, Agency, and phone   Psychiatrist  [] Psychiatrist: Traci Crespo. Location: [facility]. Date of last visit: 3/16/2022. Frequency: 1 time a month. Perceived helpfulness: helpful.   Psychotherapist  [] Therapist:  Kulwant Hernandezx2/wk and Kathrynx1/wk. Location: [facility]. Date of last visit: unknown. Frequency: see before. Perceived helpfulness: helpful.   [facility] worker  []      [] :  [facility]. Location: [facility] [phone]. Date of last visit: unknown. Frequency: unknown. Perceived helpfulness: unknown.   Waivered Services  []    ACT Teams  []    Day Treatment/PHP/CRYSTAL trtmt  []    Group Home/AFC/AL  [] adult foster couple he lives with at the adult foster care  [facility] # [phone] and [facility] # [phone].     []    Other:  [] Kyle Garzon [phone] and sister Traci Greenwood [phone].    Primary Care Provider: Kory Hudson MD. Location: unknown. Date of last visit: unknown. Frequency: unknown. Perceived helpfulness: helpful.              Social Service Assessment of identified patient needs and plan to meet those needs:   Patient stated that he had called an ambulance since he was having chest pain, stomach pain and tingling in his arms and legs. He stated that his mom (guardian) stated that he should of called a nurse hotline. Patient stated that it was not fair that he had to stay in the hospital for another day. He expressed that he had been at the ED for 20 hours. He stated that he was not angry with mo and sister however stated it is \"not fair\". Patient was able to identify watching TV as a coping strategy and expressed interest in attending groups.     Writer left a message for Guardians (mom and sister) requesting a copy of guardianship papers and collateral information.     According to Dec Assessment: collateral reports:  Phone call with pts mom, " Dalia [phone].   She and daughter legal guardian.  She states that current episode of paranoia has been occurring for the last 3 months when his Olanzapine was decreased to 2.5 mg at bedtime.  When symptoms began to emerge mom contacted the provider and Olanzapine has now been increased back to baseline dose of 7.5mg daily at bedtime on 4/21.   Provider was decreasing dose because he was trying to lose weight for a hip surgery. He believes he is hearing others talking about his illness and that he is dying, that he hears others outside talking about him, that his foster care providers are talking about him and are scared of him.  He has been getting agitated when limits are set with his use of phone or calling 911. He is yelling and agitated at home.  He has been staying with mom for the last several days because the foster mom has been concerned about his agitation when he has been in their home.   He wants the family to validate that he is dying and thinks that family is lying to him. He has been to the ED x4 for various complaints in the last several weeks and these are somatic complaints and more related to his paranoia.    She states that his paranoia and delusions are affecting his judgement.  His delusions have gotten worse despite medication management and therapy. Mom says he is depressed and not doing well.       Possible discharge plan: Return to group home with previously established supports        Barriers: Medication Management, Symptom Stabilization, Coordination of Care

## 2022-04-26 NOTE — PLAN OF CARE
"  Problem: Noninvasive Ventilation Acute  Goal: Effective Unassisted Ventilation and Oxygenation  Outcome: Ongoing, Progres                 INITIAL 3 NIGHTS HOME BIPAP/CPAP NOTE    UNIT TYPE:  Hospital home unit  MASK TYPE   Large face mask    INITIAL SET UP:   UNIT CLEAN AND FUNCTIONAL -  YES   TUBING CLEAN AND FUNCTIONAL - YES   PATIENT INTERFACE INTEGRITY HAS BEEN CHECK:    MASK IS CLEAN AND PLIABLE -  YES    MASK IS UNDAMAGED -  YES    MASK HAS ADEQUATE CUSHION -  YES     TIME OF MASK PLACEMENT  22:30 PM    TWO HOUR SKIN CHECK DONE AT      No redness or skin break    INTERVENTION INITIATED     Liquicell has been applied for more protection    SETTINGS:   CPAP -   Auto titrate    BIPAP -     02 BLED IN -      PATIENT'S TOLERANCE OF DEVICE -  YES    - Pt is 1st night on hospital home cpap, suraj well  - BS clear/diminish. Blood pressure 133/65, pulse 82, temperature 98.7  F (37.1  C), temperature source Oral, resp. rate 20, height 1.83 m (6' 0.05\"), weight (!) 167 kg (368 lb 2.7 oz), SpO2 98 %.  - Cont skin check next two nights    Plan: keep sat >/=90% and hospital home cpap at night.            "

## 2022-04-26 NOTE — PROGRESS NOTES
04/26/22 0915   Engagement   Intervention Group   Topic Detail OT: Education on cognitive wellness and interactive social activity (Garbage) to increase concentration, focus, attention to task/detail, learning a new task, healthy distraction engagement, symptom management, healthy leisure engagement, social engagement, and cognitive wellness   Attendance Did not attend

## 2022-04-26 NOTE — PLAN OF CARE
Problem: Behavioral Health Plan of Care  Goal: Plan of Care Review  Recent Flowsheet Documentation  Taken 4/26/2022 1600 by Cielo Aguirre RN  Plan of Care Reviewed With: patient  Patient Agreement with Plan of Care: agrees  Goal: Adheres to Safety Considerations for Self and Others  Intervention: Develop and Maintain Individualized Safety Plan  Recent Flowsheet Documentation  Taken 4/26/2022 1600 by Cielo Aguirre RN  Safety Measures: safety rounds completed  Goal: Absence of New-Onset Illness or Injury  Outcome: Ongoing, Progressing  Intervention: Identify and Manage Fall Risk  Recent Flowsheet Documentation  Taken 4/26/2022 1600 by Cielo Aguirre RN  Safety Measures: safety rounds completed   Goal Outcome Evaluation:    Plan of Care Reviewed With: patient      Patient has been in his room in bed until dinner time. He was compliant with VS and ate his dinner. He does not socialize with peers. He denies any depression or anxiety. He denies SI. HI and contracts for safety.  He has some delayed response with his answers.
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
## 2022-04-27 ENCOUNTER — DOCUMENTATION ONLY (OUTPATIENT)
Dept: OTHER | Facility: CLINIC | Age: 42
End: 2022-04-27
Payer: COMMERCIAL

## 2022-04-27 LAB
BASOPHILS # BLD AUTO: 0 10E3/UL (ref 0–0.2)
BASOPHILS NFR BLD AUTO: 0 %
EOSINOPHIL # BLD AUTO: 0.1 10E3/UL (ref 0–0.7)
EOSINOPHIL NFR BLD AUTO: 2 %
IMM GRANULOCYTES # BLD: 0 10E3/UL
IMM GRANULOCYTES NFR BLD: 0 %
LYMPHOCYTES # BLD AUTO: 2.6 10E3/UL (ref 0.8–5.3)
LYMPHOCYTES NFR BLD AUTO: 33 %
MONOCYTES # BLD AUTO: 0.5 10E3/UL (ref 0–1.3)
MONOCYTES NFR BLD AUTO: 6 %
NEUTROPHILS # BLD AUTO: 4.8 10E3/UL (ref 1.6–8.3)
NEUTROPHILS NFR BLD AUTO: 59 %
NRBC # BLD AUTO: 0 10E3/UL
NRBC BLD AUTO-RTO: 0 /100
POTASSIUM BLD-SCNC: 3.8 MMOL/L (ref 3.5–5)
WBC # BLD AUTO: 8 10E3/UL (ref 4–11)

## 2022-04-27 PROCEDURE — 250N000013 HC RX MED GY IP 250 OP 250 PS 637: Performed by: PSYCHIATRY & NEUROLOGY

## 2022-04-27 PROCEDURE — 128N000001 HC R&B CD/MH ADULT

## 2022-04-27 PROCEDURE — 94660 CPAP INITIATION&MGMT: CPT

## 2022-04-27 PROCEDURE — 250N000013 HC RX MED GY IP 250 OP 250 PS 637

## 2022-04-27 PROCEDURE — 84132 ASSAY OF SERUM POTASSIUM: CPT

## 2022-04-27 PROCEDURE — 999N000157 HC STATISTIC RCP TIME EA 10 MIN

## 2022-04-27 PROCEDURE — 250N000013 HC RX MED GY IP 250 OP 250 PS 637: Performed by: NURSE PRACTITIONER

## 2022-04-27 PROCEDURE — 99221 1ST HOSP IP/OBS SF/LOW 40: CPT

## 2022-04-27 PROCEDURE — 85004 AUTOMATED DIFF WBC COUNT: CPT

## 2022-04-27 PROCEDURE — 36415 COLL VENOUS BLD VENIPUNCTURE: CPT

## 2022-04-27 PROCEDURE — 99232 SBSQ HOSP IP/OBS MODERATE 35: CPT | Performed by: NURSE PRACTITIONER

## 2022-04-27 RX ORDER — POLYETHYLENE GLYCOL 3350 17 G
2 POWDER IN PACKET (EA) ORAL
Status: DISCONTINUED | OUTPATIENT
Start: 2022-04-27 | End: 2022-04-27

## 2022-04-27 RX ORDER — POLYETHYLENE GLYCOL 3350 17 G
2 POWDER IN PACKET (EA) ORAL
Status: DISCONTINUED | OUTPATIENT
Start: 2022-04-27 | End: 2022-05-11 | Stop reason: HOSPADM

## 2022-04-27 RX ADMIN — NICOTINE POLACRILEX 2 MG: 2 LOZENGE ORAL at 16:35

## 2022-04-27 RX ADMIN — OLANZAPINE 10 MG: 10 TABLET, ORALLY DISINTEGRATING ORAL at 20:47

## 2022-04-27 RX ADMIN — LISINOPRIL 10 MG: 10 TABLET ORAL at 08:12

## 2022-04-27 RX ADMIN — NAPROXEN SODIUM 440 MG: 220 TABLET, FILM COATED ORAL at 16:34

## 2022-04-27 RX ADMIN — HYDROCHLOROTHIAZIDE 25 MG: 25 TABLET ORAL at 08:12

## 2022-04-27 RX ADMIN — CALCIUM POLYCARBOPHIL 1250 MG: 625 TABLET, FILM COATED ORAL at 08:11

## 2022-04-27 RX ADMIN — NAPROXEN SODIUM 440 MG: 220 TABLET, FILM COATED ORAL at 08:12

## 2022-04-27 RX ADMIN — PANTOPRAZOLE SODIUM 40 MG: 40 TABLET, DELAYED RELEASE ORAL at 08:12

## 2022-04-27 RX ADMIN — NICOTINE POLACRILEX 2 MG: 2 LOZENGE ORAL at 19:32

## 2022-04-27 RX ADMIN — CALCIUM POLYCARBOPHIL 1250 MG: 625 TABLET, FILM COATED ORAL at 20:47

## 2022-04-27 RX ADMIN — DULOXETINE 120 MG: 60 CAPSULE, DELAYED RELEASE ORAL at 08:11

## 2022-04-27 RX ADMIN — OXYBUTYNIN CHLORIDE 30 MG: 10 TABLET, EXTENDED RELEASE ORAL at 08:11

## 2022-04-27 RX ADMIN — PRAZOSIN HYDROCHLORIDE 3 MG: 1 CAPSULE ORAL at 20:47

## 2022-04-27 RX ADMIN — Medication 50 MCG: at 08:12

## 2022-04-27 RX ADMIN — MIRABEGRON 50 MG: 25 TABLET, FILM COATED, EXTENDED RELEASE ORAL at 12:44

## 2022-04-27 RX ADMIN — NICOTINE POLACRILEX 2 MG: 2 LOZENGE ORAL at 20:59

## 2022-04-27 ASSESSMENT — ACTIVITIES OF DAILY LIVING (ADL)
DRESS: INDEPENDENT
ORAL_HYGIENE: INDEPENDENT
ORAL_HYGIENE: INDEPENDENT
HYGIENE/GROOMING: INDEPENDENT
HYGIENE/GROOMING: INDEPENDENT
DRESS: INDEPENDENT
LAUNDRY: WITH SUPERVISION
LAUNDRY: WITH SUPERVISION

## 2022-04-27 NOTE — PROGRESS NOTES
After outburst, pt. Was calmer and was medication compliant. Pt. Denied all psych symptoms and denied pain.

## 2022-04-27 NOTE — PLAN OF CARE
"Goal Outcome Evaluation:    Plan of Care Reviewed With: patient        Problem: Suicidal Behavior  Goal: Suicidal Behavior is Absent or Managed  Outcome: Ongoing, Progressing       Problem: Behavioral Health Plan of Care  Goal: Develops/Participates in Therapeutic [place] to Support Successful Transition  Outcome: Ongoing, Progressing      Patient intermittently visible on the unit. Appears flat and blunted. Patient denied  anxiety and depression. He denied SI/HI and hallucinations. Facial expression incongruent with his reported mood. Patient was refusing his \"Blood pressure  medications\" but later took all his medications after education on the benefit of not stopping his medication without talking to his  provider. Patient observed to be forgetful at times. Repeating  same question even after getting the answer already. Attended OT group. Patient on medical bed due to CPAPP use and mobility issues.      "

## 2022-04-27 NOTE — PROGRESS NOTES
04/27/22 1315   Engagement   Intervention Group   Topic Detail OT: Education on project planning with creative hands on endeavor (wall hangings) to increase concentration, focus, attention to task/detail, planning, problem solving, creative expression, symptom management, coping with stress, healthy leisure engagement, healthy distraction engagement, and social engagement   Attendance Did not attend     Did not attend after face to face invite. Patient appeared to be sleep as his eyes were closed and he was wearing his CPAP facemask; patient did not acknowledge therapist's invite.

## 2022-04-27 NOTE — PROGRESS NOTES
04/27/22 0915   Engagement   Intervention Group   Topic Detail OT: Education on physical and cognitive wellness with interactive social activities (Exercise dice and Name 5) to increase concentration, focus, attention to task/detail, memory recall, coping with stress, health distraction engagement, social wellness, physical wellness, and cognitive wellness   Attendance Did not attend   Reason for Not Attending Refused     Did not attend after face to face invite.

## 2022-04-27 NOTE — CONSULTS
"BRIEF NUTRITION ASSESSMENT      REASON FOR ASSESSMENT:  Kimo Greenwood is a 41 year old male seen by Registered Dietitian for Admission Nutrition Risk Screen for positive  - per screening, patient stated he has recently lost weight without trying, 34 pounds or more    NUTRITION HISTORY:  PMH: Major depressive Depressive Disorder,ANDRES, Mild Developmental Disorder, and Adjustment Disorder with Anxiety   - Per NP note yesterday, patient presented as tired, depressed, anxious with paranoid ideations  - denies loss in appetite  - patient lives in adult foster care  - patient trying to lose weight for a hip surgery, zyprexa decreased by provider d/t complaint of weight gain    CURRENT DIET AND INTAKE:  Diet:  regular            100% meal intakes per flowsheets, good appetite this admission.    ANTHROPOMETRICS:  Height: 6' .047\"  Weight:  368 lbs 2.69 oz  Body mass index is 49.87 kg/m .   Weight Status: Obesity Grade III BMI >40  IBW:  77.7 kg  %IBW: 217%  Weight History: no evidence of significant weight loss noted in weight history. Question accuracy of patient report. 6# weight gain noted in 6 weeks if only counting weights with ounces vs possibly reported weights (without ounces recorded)  Wt Readings from Last 10 Encounters:   04/25/22 (!) 167 kg (368 lb 2.7 oz)   04/24/22 (!) 169.2 kg (373 lb)   04/22/22 (!) 163.3 kg (360 lb)   04/20/22 (!) 165.6 kg (365 lb)   04/18/22 (!) 163.3 kg (360 lb)   03/14/22 (!) 164.6 kg (362 lb 14.4 oz)   02/28/22 (!) 165.3 kg (364 lb 6.4 oz)   02/21/22 (!) 168.5 kg (371 lb 6.4 oz)   08/27/21 (!) 174.2 kg (384 lb)   01/22/21 (!) 171.9 kg (379 lb)     LABS:  Labs reviewed    MALNUTRITION:  Visual Nutrition Focused Physical Assessment (NFPA) not completed due to patient unavailable. Do not suspect muscle/fat losses.  Patient does not meet two of the following criteria necessary for diagnosing malnutrition.     % Weight Loss:  None noted  % Intake:  No decreased intake noted  Subcutaneous " Fat Loss:  None observed  Muscle Loss:  None observed  Fluid Retention:  None noted    NUTRITION INTERVENTION:  Nutrition Diagnosis:  No nutrition diagnosis at this time.    Implementation:  Nutrition Education:  Per Provider order if indicated    FOLLOW UP/MONITORING:   RD to sign off at this time. Please re-consult if needed in the future.      Brandee Jiménez RDN, LD  Clinical Dietitian

## 2022-04-27 NOTE — ED NOTES
Message from Crys of [place] 4/26/22 at 1704 on [facility].      Forwarded phone message to EDMUND Turner.  Please call Crys at [phone].    Lyssa Richey, RN  BELEN RN Care Management

## 2022-04-27 NOTE — PLAN OF CARE
"Assessment/Intervention/Current Symptoms and Care Coordination  Writer met with patient to discuss discharge. Patient stated that he wanted to discharge tomorrow. He stated that he was angry because he came to the hospital voluntarily after mom indicated he would be here for three days. Patient verbalized anger remained appropriate and wasconcrete in his thinking. Patient is aware that he cannot return to his former group home or stay at family's home. Patient stated that he liked [facility] and this would be his first option for placement     Mom stated that patient cannot go back to the adult foster care due to excessive 911 calls. \"Just tell me the truth.\" is patient verbalizations. Paranoia started a couple of years ago about health and about other people talking about him. Thinks that a reduction of antipsychotic medications increased his paranoia. Patient can get angry verbally but does not get physically aggressive. Patient cannot stay with family. Currently has no placement.\" Patient will manipulate and bend rules. \"If there is no smoking near the front door he will do it anyway. A new placement is difficult for him. His behaviors tend to increase with a move to  [place] and calls 911 for re assurance. He gets along with others. Guardian is in agreement with IRTS referrals or a return to [facility].    Writer received calls from the following:     is in agreement with an IRTS the transitioning to adult foster care. CM stated that mom's house is not a realistic option. CM stated that mother interferes in treatment plans and can be difficult to work with. CM states that patient's main behaviors are calling 911 and smoking in th CM states that patient has recently been staying on mom's until her stabilizes in order to return to the group home. Patient is on DD waiver.    Group home states that they cannot meet his needs. He does not He calls 911 (he thinks he is going " "to die) daily is paranoid and just sits in the dark and does not engage with watching TV. Group home will not take him back. Just moved here recently. He would be better in a group setting. Just smokes cigarettes in bed. \"I cannot force patient to not smoke in bed.\" He does not want to do anything. Really paranoid.    [facility], CM does not believe he can return.  P [phone] F [phone] [email or web address].Left crisis residence on February 28th.     Discharge Plan or Goal  TBD- cannot return to group home. [facility] crisis respite Vs IRTS      Barriers to Discharge   Symptom stabilization, medication management care coordination      Referral Status  Pending      Therapist:  Kulwant Hernandezx2/wk and Kathrynx1/wk. Location: [place]. Date of last visit: unknown. Frequency: see before. Perceived helpfulness: helpful.    Psychiatrist: Traci Crespo. Location: [place]. Date of last visit: 3/16/2022. Frequency: 1 time a month. Perceived helpfulness: helpful.    :  Crys. Location: [facility] [phone]. Date of last visit: unknown. Frequency: unknown. Perceived helpfulness: unknown.    Guardian Ryann [phone] and sister Traci Greenwood [phone].     Primary Care Provider: Kory Hudson MD. Location: unknown. Date of last visit: unknown. Frequency: unknown. Perceived helpfulness: helpful    Legal Status  voluntary      Cintia Moreno, [facility], [facility], 4/27/2022, 8:51 AM     "

## 2022-04-27 NOTE — CONSULTS
[facility]  Consult Note - Hospitalist Service  Date of Admission:  4/25/2022  Consult Requested by: Sukhwinder Bolaños APRN CNP  Reason for Consult: Patient is morbidly obese.  Kindly complete PTA med rec and assess for physical problems    Assessment & Plan   Kimo Greenwood is a 41 year old male admitted on 4/25/2022. He has a PMH of polysubstance abuse (alcohol, tobacco, and cannabis), anxiety, PTSD, major depressive disorder, suicidal ideation, mild intellectual disability, leukocytosis, asthma, and hypertension. He presented to the ED 4/24 for evaluation of worsening paranoia.     # LOREN  History of sleep apnea. Using CPAP machine at night. States he is sleeping well.     # Leukocytosis- resolved   WBC on admission 11.1, now normal at 8. Afebrile, denies fever, myalgias or chills.     # Asthma  History of mild intermittent asthma. No recent exacerbations.  - PTA albuterol inhaler, 2 puffs every 6 hours prn    # Obesity  Morbidly obese with a BMI of 50.   - A1C 5.1 last august, normal blood sugar  - no history of high cholesterol  - encourage to exercise, follow healthy diet, encourage weight loss  - PTA Vitamin D3 50 mcg daily    # Hypertension  Normotensive. States he is compliant with medication.   - PTA hydrochlorothiazide 25 mg daily  - PTA lisinopril 10 mg daily  - EKG - NSR without signs of left ventricular hypertrophy, ectopy, ischemia, ST or T waves changes, or prolonged QT  - denies headache, blurry vision, chest pain, palpitations  - follow a low salt or DASH diet, increase fruits, vegetables, whole grains, maintain a healthy weight, exercise at least 30 minutes per day most days of the week    # Chronic Low Back Pain  Denies pain.   - PTA naproxen 440 mg BID with meals  - protonix 40 mg daily   - tylenol prn     # Tobacco addiction- nicorette lozenges prn     # Polysubstance Abuse  # MDD, Anxiety, PTSD  # Suicidal Ideation- Attempts by overdose 2005, 2015, 2018  - manage  "per psychiatry    Co- morbidities stable, no need for changes. Continue vitals monitoring per unit policy.   HMS will sign off.        The patient's care was discussed with the Patient.    LAMBERT Shay CNP  [facility]  Securely message with the Vocera Web Console (learn more here)  Text page via [facility] Paging/Directory       Hospitalist Service    Clinically Significant Risk Factors Present on Admission             # Severe Obesity: Estimated body mass index is 49.87 kg/m  as calculated from the following:    Height as of this encounter: 1.83 m (6' 0.05\").    Weight as of this encounter: 167 kg (368 lb 2.7 oz).      ______________________________________________________________________    Chief Complaint   \"I want to go home\"    History is obtained from the patient    History of Present Illness   Kimo Greenwood is a 41 year old male who is an inpatient in the mental health unit. Reviewed medical history and plan as noted above     Review of Systems   12 point review of systems negative except for pertinent positives mentioned in the HPI and Assessment and Plan.    Past Medical History    I have reviewed this patient's medical history and updated it with pertinent information if needed.   Past Medical History:   Diagnosis Date     Arthritis     DDD hips and knees     Asthma      Asthma      Patel's esophagus      Chronic pain      Chronic pain - left hip/leg pain     degenerative disc disease, back, hips, knees     Gastroesophageal reflux disease      History of anesthesia complications     agitation x1 after interstim 2013     Hypertension      Hypertension      Leukocytosis      LPRD (laryngopharyngeal reflux disease)      Marijuana abuse      Marijuana abuse      MDD (major depressive disorder)      MDD (major depressive disorder)      Mental retardation, mild (I.Q. 50-70)      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    With associated speech/language delay     Obesity " 11/11/2010     LOREN (obstructive sleep apnea)     Uses a CPAP     LOREN (obstructive sleep apnea)      Seborrheic dermatitis      Seborrheic dermatitis      Sleep apnea     CPAP       Past Surgical History   I have reviewed this patient's surgical history and updated it with pertinent information if needed.  Past Surgical History:   Procedure Laterality Date     ARTHROPLASTY HIP Left 1/25/2017    Procedure: ARTHROPLASTY HIP;  Surgeon: Bala Randall MD;  Location: [facility] OR     ARTHROPLASTY HIP Left      ARTHROPLASTY REVISION HIP Left 6/24/2019    Procedure: Revision left total hip arthroplasty with a head and liner exchange to a Biomet dual mobility head and liner.;  Surgeon: Bala Randall MD;  Location: [facility] OR     BLADDER SURGERY      Ibarra, [facility],Interstem stimulator implant     CLOSED REDUCTION HIP Left 6/10/2019    Procedure: Closed reduction under general anesthesia left recurrent prosthetic hip dislocation;  Surgeon: Rafat Cazares MD;  Location: [facility] OR     COLONOSCOPY N/A 10/30/2015    Procedure: COMBINED COLONOSCOPY, SINGLE OR MULTIPLE BIOPSY/POLYPECTOMY BY BIOPSY;  Surgeon: Alexis Jackson MD;  Location:  GI     COLONOSCOPY N/A 11/17/2016    Procedure: COMBINED COLONOSCOPY, SINGLE OR MULTIPLE BIOPSY/POLYPECTOMY BY BIOPSY;  Surgeon: Cat Huber MD;  Location: [facility] GI     COLONOSCOPY N/A 10/28/2020    Procedure: COLONOSCOPY;  Surgeon: You Kraus MD;  Location:  OR     COLONOSCOPY       ESOPHAGOSCOPY, GASTROSCOPY, DUODENOSCOPY (EGD), COMBINED N/A 11/17/2016    Procedure: COMBINED ESOPHAGOSCOPY, GASTROSCOPY, DUODENOSCOPY (EGD), BIOPSY SINGLE OR MULTIPLE;  Surgeon: Cat Huber MD;  Location: [facility] GI     ESOPHAGOSCOPY, GASTROSCOPY, DUODENOSCOPY (EGD), COMBINED N/A 3/12/2020    Procedure: ESOPHAGOGASTRODUODENOSCOPY WITH BIOPSIES;  Surgeon: You Kraus MD;  Location:  OR     ESOPHAGOSCOPY, GASTROSCOPY, DUODENOSCOPY (EGD), COMBINED  "N/A 10/28/2020    Procedure: ESOPHAGOGASTRODUODENOSCOPY, WITH BIOPSY;  Surgeon: You Kraus MD;  Location:  OR     ESOPHAGOSCOPY, GASTROSCOPY, DUODENOSCOPY (EGD), COMBINED       EXAM UNDER ANESTHESIA, FULGURATE CONDYLOMA ANUS, COMBINED N/A 12/22/2016    Procedure: COMBINED EXAM UNDER ANESTHESIA, FULGURATE CONDYLOMA ANUS;  Surgeon: Nya Cabello MD;  Location: [facility] OR     GENITOURINARY SURGERY       JOINT REPLACEMENT Left 2017    MARGO, Revision Left MARGO     OTHER SURGICAL HISTORY      interstim stimulator implant     IA CYSTOURETHROSCOPY INJ CHEMODENERVATION BLADDER N/A 1/16/2019    Procedure: CYSTOSCOPY BOTOX BLADDER INJECTIONS (100 UNITS IN 10CC);  Surgeon: Didier Ibarra MD;  Location: [facility];  Service: Urology     IA IMPLANT PERIPH/GASTRIC NEUROSTIM/ N/A 1/8/2020    Procedure: NEW INTERSTIM LEAD, REPLACE OLD; NEW INTERSTIM BATTERY, REPLACE OLD;  Surgeon: Didier Ibarra MD;  Location: [facility];  Service: Urology       Social History   I have reviewed this patient's social history and updated it with pertinent information if needed.  Social History     Tobacco Use     Smoking status: Current Every Day Smoker     Packs/day: 1.00     Years: 1.00     Pack years: 1.00     Types: Vaping Device, Cigarettes     Smokeless tobacco: Current User     Types: Chew     Tobacco comment: Smokes E Cigs equal to 1 PPD   Vaping Use     Vaping Use: Every day     Substances: Nicotine, Flavoring     Devices: Refillable tank   Substance Use Topics     Alcohol use: Yes     Comment: socially--\"not very often\" \"once a month\"     Drug use: No     Comment: patient denies any marijuana use       Family History   I have reviewed this patient's family history and updated it with pertinent information if needed.  Family History   Problem Relation Age of Onset     Anxiety Disorder Mother      Depression Mother      Ulcerative Colitis Mother 18     Breast Cancer Maternal Grandmother      Cerebrovascular " Disease Maternal Grandmother      Breast Cancer Maternal Aunt      Cancer Maternal Grandfather         grt uncles colon cancer       Medications   I have reviewed this patient's current medications    Allergies   Allergies   Allergen Reactions     Other [No Clinical Screening - See Comments] Other (See Comments)     Awoke from anesthesia with anger and ripping off dressing, after interstim placement, outside hospital 2013       Physical Exam   Vital Signs: Temp: 97.7  F (36.5  C) Temp src: Oral BP: 120/53 Pulse: 78   Resp: 18 SpO2: 97 % O2 Device: BiPAP/CPAP    Weight: 368 lbs 2.69 oz    Constitutional: awake, alert, cooperative, no apparent distress, and appears stated age  Respiratory: No increased work of breathing, good air exchange, clear to auscultation bilaterally, no crackles or wheezing  Cardiovascular: Normal apical impulse, regular rate and rhythm, normal S1 and S2, no S3 or S4, and no murmur noted  Neuropsychiatric: General: normal, calm and normal eye contact    Data   Results for orders placed or performed during the hospital encounter of 04/25/22 (from the past 24 hour(s))   WBC and differential    Narrative    The following orders were created for panel order WBC and differential.  Procedure                               Abnormality         Status                     ---------                               -----------         ------                     WBC and Differential[[id number]]                             Final result                 Please view results for these tests on the individual orders.   Potassium   Result Value Ref Range    Potassium 3.8 3.5 - 5.0 mmol/L   WBC and Differential   Result Value Ref Range    WBC Count 8.0 4.0 - 11.0 10e3/uL    % Neutrophils 59 %    % Lymphocytes 33 %    % Monocytes 6 %    % Eosinophils 2 %    % Basophils 0 %    % Immature Granulocytes 0 %    NRBCs per 100 WBC 0 <1 /100    Absolute Neutrophils 4.8 1.6 - 8.3 10e3/uL    Absolute Lymphocytes 2.6 0.8 - 5.3  10e3/uL    Absolute Monocytes 0.5 0.0 - 1.3 10e3/uL    Absolute Eosinophils 0.1 0.0 - 0.7 10e3/uL    Absolute Basophils 0.0 0.0 - 0.2 10e3/uL    Absolute Immature Granulocytes 0.0 <=0.4 10e3/uL    Absolute NRBCs 0.0 10e3/uL

## 2022-04-27 NOTE — PROGRESS NOTES
"Writer approached pt to introduce self and role and pt yelled at writer, \"What the fuck you want?\" Writer asked what time he would like his PM medication and pt walked away from writer. Pt. Then proceeded to milieu and then returned to room and slammed door very aggressively. Pt. Then came out again and went to get snack and Charge RN noted pt bringing his snacks back in his room. Pt. Was instructed he can not eat in his room and pt. Continued to ambulate back to room and ate snack in room.   "

## 2022-04-27 NOTE — PROGRESS NOTES
Pt slept through the shift without any problems any problems. Checked patient every 15 minutes, checked patient every minutes, he re,ained safe. Will continue to monitor patient.

## 2022-04-27 NOTE — PROGRESS NOTES
"PSYCHIATRY  PROGRESS NOTE     DATE OF SERVICE   4/27/2022         CHIEF COMPLAINT   \"I am doing much better\"       SUBJECTIVE   Nursing reports: He denies all psych symptoms. Much encouragement needed for him to take blood pressure medications.       reports: Patient wanted to discharge tomorrow. Patient cannot return to the adult foster care per her mother. Guardian is in agreement with [facility] referrals or [facility].        OBJECTIVE   Chart reviewed and patient assessed. Patient was seen and evaluated in his room by himself. Upon patient interview, patient reports he is doing much better compared to yesterday, saying he is no longer feeling as though he is terminally ill. Patient presents as somewhat irritable, paranoid, calm and cooperative. Patient states he would have preferred to leave the hospital today but added his mother will not allow him to. Patient continued to be suspicious of staff. For instance, when writer asked why he was irritable earlier today, he stated he thought all the staff were lying to him when they told him this writer was not available in his office when he asked to meet with the writer this morning. Patient denies suicidal and homicidal ideations. He also denies both auditory and visual hallucinations. He endorsed mild depression and anxiety. Writer informed patient he is no longer able to return to the adult foster home per the conversation between the guardian and the . I informed patient that the team is planning to look for a safe discharge disposition while he continues to remain in the hospital for further symptoms stabilizations and medication management. Patient verbalized understanding and he is in agreement with the plan. Zyprexa titrated to10 mg starting tonight to effectively manage psychosis. Writer called patient's guardian for update but not available, message left for call back.        MEDICATIONS   Medications:  Scheduled " "Meds:    calcium polycarbophil  1,250 mg Oral BID     DULoxetine  120 mg Oral Daily     hydrochlorothiazide  25 mg Oral Daily     lisinopril  10 mg Oral QAM     mirabegron  50 mg Oral Daily     naproxen sodium  440 mg Oral BID w/meals     OLANZapine zydis  10 mg Oral At Bedtime     oxybutynin ER  30 mg Oral Daily     pantoprazole  40 mg Oral QAM AC     prazosin  3 mg Oral At Bedtime     vitamin D3  50 mcg Oral Daily     Continuous Infusions:  PRN Meds:.acetaminophen, albuterol, alum & mag hydroxide-simethicone, hydrOXYzine, nicotine, nicotine, OLANZapine **OR** OLANZapine, senna-docusate, traZODone    Medication adherence issues: MS Med Adherence Y/N: No  Medication side effects: MEDICATION SIDE EFFECTS: no side effects reported  Benefit: Yes / No: Yes       ROS   A 10-point review of systems was completed and is otherwise negative with the exception of HPI.       MENTAL STATUS EXAM   Vitals: /61 (BP Location: Right arm, Patient Position: Sitting, Cuff Size: Adult Large)   Pulse 81   Temp 98.2  F (36.8  C) (Oral)   Resp 18   Ht 1.83 m (6' 0.05\")   Wt (!) 167 kg (368 lb 2.7 oz)   SpO2 96%   BMI 49.87 kg/m      Appearance:  Casually groomed  Mood:  {Mood: Anxious  and Euthymic  Affect: full range  was congruent to speech  Suicidal Ideation: PRESENT / ABSENT: absent   Homicidal Ideation: PRESENT / ABSENT: absent   Thought process: concrete   Thought content: endorses delusions and paranoid ideation.   Fund of Knowledge: Below average  Attention/Concentration: Fair  Language ability:  Intact  Memory:  Immediate recall intact, Short-term memory intact and Long-term memory intact  Insight:  limited.  Judgement: limited  Orientation: Yes, x4  Psychomotor Behavior: denies tardive dyskinesia, dystonia or tics  Muscle Strength and Tone: MuscleStrength: Normal  Gait and Station: Normal       LABS   personally reviewed.     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)    Active Problem List:  Patient Active Problem List   Diagnosis     Speech/language delay     Tobacco use disorder     Nocturnal enuresis     Moderate major depression (H)     LOREN (obstructive sleep apnea)     Essential hypertension     Morbid obesity (H)     Leukocytosis     Suicidal ideation     Chronic low back pain     Bilateral low back pain with left-sided sciatica     History of colonic polyps     Mild intellectual disability     S/P total hip arthroplasty     Vitamin D deficiency     LPRD (laryngopharyngeal reflux disease)     Patel's esophagus determined by biopsy     Mild intermittent asthma     Somatic dysfunction of lumbar region     Somatic dysfunction of sacral region     Sacral pain     Thoracic segment dysfunction     Hip pain, left     Duodenitis     Overactive bladder     Venous stasis dermatitis of both lower extremities     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 Ongoing education given regarding diagnostic and treatment options with risks, benefits and alternatives and adequate verbalization of understanding.  2. Medications: Cymbalta 120 mg                           Zyprexa 10 mg starting 4/27/22  3) Hospitalist consult: Hospitalist to see patient as needed  4) Legal: Voluntary  5)  to follow regarding collecting and reviewing collateral information, referrals and disposition planning     Risk Assessment: [facility] RISK ASSESSMENT: Patient able to contract for safety    Coordination of Care:   Treatment Plan reviewed and physician signed, Care discussed with Care/Treatment Team Members, Chart reviewed and Patient seen      Re-Certification I certify that the inpatient psychiatric facility services furnished since the previous certification were, and  continue to be, medically necessary for, either, treatment which could reasonably be expected to improve the patient s condition or diagnostic study and that the hospital records indicate that the services furnished were, either, intensive treatment services, admission and related services necessary for diagnostic study, or equivalent services.     I certify that the patient continues to need, on a daily basis, active treatment furnished directly by or requiring the supervision of inpatient psychiatric facility personnel.   I estimate 5-7 days of hospitalization is necessary for proper treatment of the patient. My plans for post-hospital care for this patient are  adult foster care     LAMBERT Zhu CNP    -     4/27/2022     -     11:54 AM    Total time  25 minutes with > 50%spent on coordination of cares and psycho-education.    This note was created with help of Dragon dictation system. Grammatical / typing errors are not intentional.    LAMBERT Zhu CNP

## 2022-04-27 NOTE — PLAN OF CARE
"  Problem: Noninvasive Ventilation Acute  Goal: Effective Unassisted Ventilation and Oxygenation  Outcome: Ongoing, Progres                 INITIAL 3 NIGHTS HOME BIPAP/CPAP NOTE    UNIT TYPE:  Hospital home unit  MASK TYPE   Large face mask    INITIAL SET UP:   UNIT CLEAN AND FUNCTIONAL -  YES   TUBING CLEAN AND FUNCTIONAL - YES   PATIENT INTERFACE INTEGRITY HAS BEEN CHECK:    MASK IS CLEAN AND PLIABLE -  YES    MASK IS UNDAMAGED -  YES    MASK HAS ADEQUATE CUSHION -  YES     TIME OF MASK PLACEMENT  21:40 PM    TWO HOUR SKIN CHECK DONE AT      No redness or skin break    INTERVENTION INITIATED     Liquicell has been applied for more protection    SETTINGS:   CPAP -   Auto titrate    BIPAP -     02 BLED IN -      PATIENT'S TOLERANCE OF DEVICE -  YES    - Pt is 2nd night on hospital home cpap, suraj well  - BS clear/diminish. Blood pressure 120/53, pulse 78, temperature 97.7  F (36.5  C), temperature source Oral, resp. rate 18, height 1.83 m (6' 0.05\"), weight (!) 167 kg (368 lb 2.7 oz), SpO2 97 %.    - Cont skin check one more night    Plan: keep sat >/=90% and hospital home cpap at night.            "

## 2022-04-28 PROCEDURE — 250N000013 HC RX MED GY IP 250 OP 250 PS 637: Performed by: PSYCHIATRY & NEUROLOGY

## 2022-04-28 PROCEDURE — 94660 CPAP INITIATION&MGMT: CPT

## 2022-04-28 PROCEDURE — 128N000001 HC R&B CD/MH ADULT

## 2022-04-28 PROCEDURE — 250N000013 HC RX MED GY IP 250 OP 250 PS 637: Performed by: NURSE PRACTITIONER

## 2022-04-28 PROCEDURE — 99232 SBSQ HOSP IP/OBS MODERATE 35: CPT | Performed by: NURSE PRACTITIONER

## 2022-04-28 PROCEDURE — 999N000157 HC STATISTIC RCP TIME EA 10 MIN

## 2022-04-28 PROCEDURE — 250N000013 HC RX MED GY IP 250 OP 250 PS 637

## 2022-04-28 RX ADMIN — CALCIUM POLYCARBOPHIL 1250 MG: 625 TABLET, FILM COATED ORAL at 09:02

## 2022-04-28 RX ADMIN — OLANZAPINE 10 MG: 10 TABLET, ORALLY DISINTEGRATING ORAL at 20:27

## 2022-04-28 RX ADMIN — OXYBUTYNIN CHLORIDE 30 MG: 10 TABLET, EXTENDED RELEASE ORAL at 09:02

## 2022-04-28 RX ADMIN — DULOXETINE 120 MG: 60 CAPSULE, DELAYED RELEASE ORAL at 09:02

## 2022-04-28 RX ADMIN — PRAZOSIN HYDROCHLORIDE 3 MG: 1 CAPSULE ORAL at 20:28

## 2022-04-28 RX ADMIN — NAPROXEN SODIUM 440 MG: 220 TABLET, FILM COATED ORAL at 09:01

## 2022-04-28 RX ADMIN — NAPROXEN SODIUM 440 MG: 220 TABLET, FILM COATED ORAL at 18:31

## 2022-04-28 RX ADMIN — NICOTINE POLACRILEX 2 MG: 2 LOZENGE ORAL at 20:30

## 2022-04-28 RX ADMIN — PANTOPRAZOLE SODIUM 40 MG: 40 TABLET, DELAYED RELEASE ORAL at 09:02

## 2022-04-28 RX ADMIN — HYDROCHLOROTHIAZIDE 25 MG: 25 TABLET ORAL at 09:03

## 2022-04-28 RX ADMIN — MIRABEGRON 50 MG: 25 TABLET, FILM COATED, EXTENDED RELEASE ORAL at 12:29

## 2022-04-28 RX ADMIN — NICOTINE POLACRILEX 2 MG: 2 LOZENGE ORAL at 18:33

## 2022-04-28 RX ADMIN — CALCIUM POLYCARBOPHIL 1250 MG: 625 TABLET, FILM COATED ORAL at 20:28

## 2022-04-28 RX ADMIN — Medication 50 MCG: at 09:03

## 2022-04-28 RX ADMIN — LISINOPRIL 10 MG: 10 TABLET ORAL at 09:02

## 2022-04-28 RX ADMIN — NICOTINE POLACRILEX 2 MG: 2 LOZENGE ORAL at 14:47

## 2022-04-28 RX ADMIN — NICOTINE POLACRILEX 2 MG: 2 LOZENGE ORAL at 09:03

## 2022-04-28 ASSESSMENT — ACTIVITIES OF DAILY LIVING (ADL)
DRESS: SCRUBS (BEHAVIORAL HEALTH)
ORAL_HYGIENE: INDEPENDENT
HYGIENE/GROOMING: INDEPENDENT
LAUNDRY: WITH SUPERVISION

## 2022-04-28 NOTE — PLAN OF CARE
Goal Outcome Evaluation: Will encourage groups.       Problem: Behavioral Health Plan of Care  Goal: Develops/Participates in Therapeutic Turner to Support Successful Transition  Outcome: Ongoing, Progressing     Problem: Behavioral Health Plan of Care  Goal: Optimized Coping Skills in Response to Life Stressors  Outcome: Ongoing, Progressing     Pt out for breakfast at this time.     Pt requires a medical bed d/t respiratory condition. Pt wears a CPAP at night.     Room checks/sweeps performed per unit routine.    Gaby Rivera RN

## 2022-04-28 NOTE — PROGRESS NOTES
7 PM to 7 AM RT NOTE:    Pt placed on hospital Auto titrating CPAP, RA, to a large over the nose mask at [phone]. 4500 staff to place unit in the med room in the am when pt wakes. RT to follow

## 2022-04-28 NOTE — PROGRESS NOTES
Blunted, flat. Somewhat dismissive of attempts to interact. Denies symptoms. Denies pain. Denies SI. Guardian/mother will visit this evening. Will likely bring pt's Sadexa injections--all other medications reviewed with her. Mother also spoke with Alexi today.    Gaby Rivera RN

## 2022-04-28 NOTE — PROGRESS NOTES
"PSYCHIATRY  PROGRESS NOTE     DATE OF SERVICE   4/28/2022         CHIEF COMPLAINT   \"I'm fine\"       SUBJECTIVE   Nursing reports:  Blunted, flat. Somewhat dismissive of attempts to interact. Denies symptoms. Denies pain. Denies SI. Guardian/mother will visit this evening. Will likely bring pt's Sadexa injections--all other medications reviewed with her.       reports: Patient was reviewed with the  during the IDT. Patient is aware about the discharge plan of going to an [facility]S or [facility] bed when opening becomes available. Referrals to IRTS  faxed today     OBJECTIVE   Chart reviewed and patient assessed. Patient was seen and evaluated in his room by himself. Upon patient interview, patient reports doing well, stating he slept better last night. Patient was somewhat guarded but calm and less paranoid. He stated he remains hopeful about discharging to an IRTS facility soonest. Patient denies suicidal and homicidal ideations. He also denies both auditory and visual hallucinations. He endorsed mild depression and anxiety. Per nursing reports, he continued to be hesitant before taking his blood pressure medication thinking his blood pressure is too low despite blood pressure being wnl. Writer encouraged patient to be more trusting with the staff as they know when to give or hold blood pressure medication. Patient appeared to be having good tolerability with the Zyprexa titration. Patient's guardian/mother called writer for update. She requested patient to be restarted on Saxenda injection 3 mg daily for weight loss. Consult placed to the hospitalist to review medication and order if appropriate.      MEDICATIONS   Medications:  Scheduled Meds:    calcium polycarbophil  1,250 mg Oral BID     DULoxetine  120 mg Oral Daily     hydrochlorothiazide  25 mg Oral Daily     liraglutide - Weight Management  3 mg Subcutaneous Daily     lisinopril  10 mg Oral QAM     mirabegron  50 mg Oral Daily     " "naproxen sodium  440 mg Oral BID w/meals     OLANZapine zydis  10 mg Oral At Bedtime     oxybutynin ER  30 mg Oral Daily     pantoprazole  40 mg Oral QAM AC     prazosin  3 mg Oral At Bedtime     vitamin D3  50 mcg Oral Daily     Continuous Infusions:  PRN Meds:.acetaminophen, albuterol, alum & mag hydroxide-simethicone, hydrOXYzine, nicotine, OLANZapine **OR** OLANZapine, senna-docusate, traZODone    Medication adherence issues: MS Med Adherence Y/N: No  Medication side effects: MEDICATION SIDE EFFECTS: no side effects reported  Benefit: Yes / No: Yes       ROS   A 10-point review of systems was completed and is otherwise negative with the exception of HPI.       MENTAL STATUS EXAM   Vitals: /59 (BP Location: Left arm, Patient Position: Sitting, Cuff Size: Adult Large)   Pulse 78   Temp 97.8  F (36.6  C) (Oral)   Resp 16   Ht 1.83 m (6' 0.05\")   Wt (!) 167 kg (368 lb 2.7 oz)   SpO2 97%   BMI 49.87 kg/m      Appearance:  Casually groomed  Mood:  {Mood: Anxious  and Euthymic  Affect: full range  was congruent to speech  Suicidal Ideation: PRESENT / ABSENT: absent   Homicidal Ideation: PRESENT / ABSENT: absent   Thought process: concrete   Thought content: endorses delusions and paranoid ideation.   Fund of Knowledge: Below average  Attention/Concentration: Fair  Language ability:  Intact  Memory:  Immediate recall intact, Short-term memory intact and Long-term memory intact  Insight:  limited.  Judgement: limited  Orientation: Yes, x4  Psychomotor Behavior: denies tardive dyskinesia, dystonia or tics  Muscle Strength and Tone: MuscleStrength: Normal  Gait and Station: Normal       LABS   personally reviewed.     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)    Active Problem List:  Patient Active Problem List   Diagnosis     Speech/language delay     Tobacco use disorder     " Nocturnal enuresis     Moderate major depression (H)     LOREN (obstructive sleep apnea)     Essential hypertension     Morbid obesity (H)     Leukocytosis     Suicidal ideation     Chronic low back pain     Bilateral low back pain with left-sided sciatica     History of colonic polyps     Mild intellectual disability     S/P total hip arthroplasty     Vitamin D deficiency     LPRD (laryngopharyngeal reflux disease)     Patel's esophagus determined by biopsy     Mild intermittent asthma     Somatic dysfunction of lumbar region     Somatic dysfunction of sacral region     Sacral pain     Thoracic segment dysfunction     Hip pain, left     Duodenitis     Overactive bladder     Venous stasis dermatitis of both lower extremities     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 Ongoing education given regarding diagnostic and treatment options with risks, benefits and alternatives and adequate verbalization of understanding.  2. Medications: Cymbalta 120 mg                           Zyprexa 10 mg starting 4/27/22  3) Hospitalist consult: Hospitalist to see patient as needed  4) Legal: Voluntary  5)  to follow regarding collecting and reviewing collateral information, referrals and disposition planning     Risk Assessment: [facility] RISK ASSESSMENT: Patient able to contract for safety    Coordination of Care:   Treatment Plan reviewed and physician signed, Care discussed with Care/Treatment Team Members, Chart reviewed and Patient seen      Re-Certification I certify that the inpatient psychiatric facility services furnished since the previous certification were, and continue to be, medically necessary for, either, treatment which could reasonably be expected to improve the patient s condition or diagnostic study and that the hospital records indicate that the services  furnished were, either, intensive treatment services, admission and related services necessary for diagnostic study, or equivalent services.     I certify that the patient continues to need, on a daily basis, active treatment furnished directly by or requiring the supervision of inpatient psychiatric facility personnel.   I estimate 5-7 days of hospitalization is necessary for proper treatment of the patient. My plans for post-hospital care for this patient are  adult foster care     LAMBERT Zhu CNP    -     4/28/2022     -     2:49 PM    Total time  25 minutes with > 50%spent on coordination of cares and psycho-education.    This note was created with help of Dragon dictation system. Grammatical / typing errors are not intentional.    LAMBERT Zhu CNP

## 2022-04-28 NOTE — PLAN OF CARE
Problem: Behavioral Health Plan of Care  Goal: Plan of Care Review  Outcome: Ongoing, Progressing     Problem: Sleep Disturbance  Goal: Adequate Sleep/Rest  Outcome: Ongoing, Progressing     Problem: Suicidal Behavior  Goal: Suicidal Behavior is Absent or Managed  Outcome: Ongoing, Progressing   Goal Outcome Evaluation:       No self harming behavior noted,  safety checks done and pt on suicide precautions. Sleeping uneventful throughout the night. Will continue to monitor.

## 2022-04-28 NOTE — PROGRESS NOTES
"   04/28/22 0800   Therapy Assessment   Patient Presentation Care plan initiated for pt who admitted on 4/25/22. Per H&P: \"This is a 41 year old male with the history of Major depressive Depressive Disorder,ANDRES, Mild Developmental Disorder, and Adjustment Disorder with Anxiety who presented to the ED due to concerns related to increased paranoia and delusions.\" Patient may benefit from independent and group exploration of coping skills and leisure interests for symptom and stress management, exploration of and practice using relapse prevention strategies, and opportunities for: Self-care skills, social interaction skills, self-management skills and ADL/work/leisure/processing skills training.  Patient will continue to be invited to groups and team will encourage individual coping skill exploration.   Clinical Impression   Beh OT Plan for Next Session Engage in 1 OT group activity this review period to support recovery and encourage engagement in meaningful, goal-directed activity.     "

## 2022-04-28 NOTE — PLAN OF CARE
Assessment/Intervention/Current Symptoms and Care Coordination  Writer met with patient to discuss discharge. Patient was lying in bed sleeping when writer approached. Patient was difficult to wake. Patient responded to writer and is aware of discharge plan. Patient agreed to meet again tomorrow. IRTS referrals have been faxed.     Guardian has signed SATNAM's for IRTS referrals and patient's community partners.    Discharge Plan or Goal  TBD- cannot return to group home. [facility] crisis respite Vs IRTS      Barriers to Discharge   Symptom stabilization, medication management care coordination      Referral Status Pending  Referrals:    [facility] all locations faxed 4/28 fax  phone central access [phone]    [facility]  [facility] faxed 4/28 fax  phone   [facility] fax  phone     [facility] faxed 4/28  Fax  phone     Patient had been at [facility]([place] crisis respite in the past, CM does not believe he can return.  P [phone] F [phone] [email or web address].Left crisis residence on February 28th.     Therapist:  Kulwant Hernandezx2/wk and Kathrynx1/wk. Location: [place]. Date of last visit: unknown. Frequency: see before. Perceived helpfulness: helpful.    Psychiatrist: Traci Crespo. Location: [place]. Date of last visit: 3/16/2022. Frequency: 1 time a month. Perceived helpfulness: helpful.    :  [facility]. Location: [place] [phone]. Date of last visit: unknown. Frequency: unknown. Perceived helpfulness: unknown.    Kyle Garzon [phone] and sister Traci Greenwood [phone].     Primary Care Provider: Kory Hudson MD. Location: unknown. Date of last visit: unknown. Frequency: unknown. Perceived helpfulness: helpful    Legal Status  voluntary      Cintia Moreno, [facility], [facility], 4/27/2022, 8:51 AM

## 2022-04-28 NOTE — PLAN OF CARE
04/28/22 1012   Individualization/Patient Specific Goals   Patient Personal Strengths community support   Patient Vulnerabilities lacks insight into illness;housing insecurity   Patient-Specific Goals (Include Timeframe) discharge to IRTS vs. crisis group home   Interprofessional Rounds   Participants advanced practice nurse;nursing;OT;pharmacy;social work   Team Discussion   Participants Provider - AA; RN - SN; OT - AF; Pharmacy - MS; CTC - JL   Anticipated length of stay 10-14 days   Continued Stay Criteria/Rationale care coordination, symptom stabilization   Plan anticipated discharge to IRTS vs. crisis group home   Anticipated Discharge Disposition IRTS;group home     PRECAUTIONS AND SAFETY    Behavioral Orders   Procedures    Code 1 - Restrict to Unit    Routine Programming     As clinically indicated    Status 15     Every 15 minutes.    Suicide precautions     Patients on Suicide Precautions should have a Combination Diet ordered that includes a Diet selection(s) AND a Behavioral Tray selection for Safe Tray - with utensils, or Safe Tray - NO utensils         Safety  Safety WDL: WDL  Patient Location: patient room, own  Observed Behavior: sleeping  Observed Behavior (Comment): appeared sad  Safety Measures: safety rounds completed  Diversional Activity: television  Suicidality: Status 15

## 2022-04-29 PROCEDURE — 99232 SBSQ HOSP IP/OBS MODERATE 35: CPT | Performed by: NURSE PRACTITIONER

## 2022-04-29 PROCEDURE — 250N000013 HC RX MED GY IP 250 OP 250 PS 637: Performed by: PSYCHIATRY & NEUROLOGY

## 2022-04-29 PROCEDURE — 94660 CPAP INITIATION&MGMT: CPT

## 2022-04-29 PROCEDURE — 250N000013 HC RX MED GY IP 250 OP 250 PS 637: Performed by: NURSE PRACTITIONER

## 2022-04-29 PROCEDURE — 999N000157 HC STATISTIC RCP TIME EA 10 MIN

## 2022-04-29 PROCEDURE — 250N000013 HC RX MED GY IP 250 OP 250 PS 637

## 2022-04-29 PROCEDURE — 128N000001 HC R&B CD/MH ADULT

## 2022-04-29 RX ADMIN — CALCIUM POLYCARBOPHIL 1250 MG: 625 TABLET, FILM COATED ORAL at 08:01

## 2022-04-29 RX ADMIN — DULOXETINE 120 MG: 60 CAPSULE, DELAYED RELEASE ORAL at 08:00

## 2022-04-29 RX ADMIN — NICOTINE POLACRILEX 2 MG: 2 LOZENGE ORAL at 15:51

## 2022-04-29 RX ADMIN — OLANZAPINE 10 MG: 10 TABLET, ORALLY DISINTEGRATING ORAL at 20:55

## 2022-04-29 RX ADMIN — NAPROXEN SODIUM 440 MG: 220 TABLET, FILM COATED ORAL at 08:01

## 2022-04-29 RX ADMIN — MIRABEGRON 50 MG: 25 TABLET, FILM COATED, EXTENDED RELEASE ORAL at 08:01

## 2022-04-29 RX ADMIN — NAPROXEN SODIUM 440 MG: 220 TABLET, FILM COATED ORAL at 16:47

## 2022-04-29 RX ADMIN — CALCIUM POLYCARBOPHIL 1250 MG: 625 TABLET, FILM COATED ORAL at 20:54

## 2022-04-29 RX ADMIN — Medication 50 MCG: at 08:00

## 2022-04-29 RX ADMIN — OLANZAPINE 10 MG: 10 TABLET, FILM COATED ORAL at 11:09

## 2022-04-29 RX ADMIN — PANTOPRAZOLE SODIUM 40 MG: 40 TABLET, DELAYED RELEASE ORAL at 06:56

## 2022-04-29 RX ADMIN — HYDROCHLOROTHIAZIDE 25 MG: 25 TABLET ORAL at 08:00

## 2022-04-29 RX ADMIN — LISINOPRIL 10 MG: 10 TABLET ORAL at 08:00

## 2022-04-29 RX ADMIN — PRAZOSIN HYDROCHLORIDE 3 MG: 1 CAPSULE ORAL at 20:55

## 2022-04-29 RX ADMIN — NICOTINE POLACRILEX 2 MG: 2 LOZENGE ORAL at 11:12

## 2022-04-29 RX ADMIN — NICOTINE POLACRILEX 2 MG: 2 LOZENGE ORAL at 20:55

## 2022-04-29 RX ADMIN — OXYBUTYNIN CHLORIDE 30 MG: 10 TABLET, EXTENDED RELEASE ORAL at 08:01

## 2022-04-29 ASSESSMENT — ACTIVITIES OF DAILY LIVING (ADL)
DRESS: INDEPENDENT
DRESS: INDEPENDENT
HYGIENE/GROOMING: INDEPENDENT
LAUNDRY: WITH SUPERVISION
LAUNDRY: WITH SUPERVISION
ORAL_HYGIENE: INDEPENDENT
HYGIENE/GROOMING: INDEPENDENT
ORAL_HYGIENE: INDEPENDENT

## 2022-04-29 NOTE — PROGRESS NOTES
"PSYCHIATRY  PROGRESS NOTE     DATE OF SERVICE   4/29/2022         CHIEF COMPLAINT   \"I would like to discharge\"       SUBJECTIVE   Nursing reports:  Blunted, flat. Perseverated about discharging. Had an outburst after meeting with the provider. He was give PRN Zyprexa for agitation. He denies psych symptoms. He is med compliant.        reports: Patient was reviewed with the  during the IDT. Patient is aware about the discharge plan of going to an IRTS or [facility] bed when opening becomes available. Referrals to IRTS  faxed today     OBJECTIVE   Chart reviewed and patient assessed. Patient was seen and evaluated in the day area by himself. Upon patient interview, patient reports he is doing okay, saying he is no longer having the belief that he is terminally ill. Patient presents as calm, somewhat forgetful, but cooperative. Patient denies suicidal and homicidal ideations. He also denies both auditory and visual hallucinations. He endorsed mild depression and anxiety. Patient informed writer he would like to discharge home today despite being aware he cannot live with his mother at this time. I reminded patient about the plan to discharge him to an IRTS facility or Crisis bed when opening becomes available. Writer informed patient that his mother/Guarding with his  are in support of him staying in the hospital for further symptoms stabilization and medication management pending opening availability at the IRTS. Patient then asked to be transferred to a different hospital though he could not justify why. Though he verbalized understanding of the discharge plan, his affect remained blunted and tensed.     After 5 minutes of talking with patient, this writer heard a loud banging noise while the patient was yelling he wanted to discharge. Staff reported patient went inside his room, slamming his door while demanding to be discharged. Patient reportedly brought his CPAP outside and " slammed it on the floor outside his room. Staff offered PRN oral Zyprexa for agitation which he calmly took. Writer later talked to patient about his behavior and the need to be appropriate on the unit while making his grievances known. I also informed patient his guardian is in support of him staying in the hospital until opening becomes available. I called patient's guardian to update her regarding patient's outburst. The guardian insisted patient should remain in the hospital for symptoms stabilization pending an opening becomes available at the IRTS facility or [facility] bed. She also told writer she had to cut her visit short yesterday as patient was perseverating about coming home to her. She stated patient has been calling her at intervals requesting to be discharged from the hospital.         MEDICATIONS   Medications:  Scheduled Meds:    calcium polycarbophil  1,250 mg Oral BID     DULoxetine  120 mg Oral Daily     hydrochlorothiazide  25 mg Oral Daily     liraglutide - Weight Management  3 mg Subcutaneous Daily     lisinopril  10 mg Oral QAM     mirabegron  50 mg Oral Daily     naproxen sodium  440 mg Oral BID w/meals     OLANZapine zydis  10 mg Oral At Bedtime     oxybutynin ER  30 mg Oral Daily     pantoprazole  40 mg Oral QAM AC     prazosin  3 mg Oral At Bedtime     vitamin D3  50 mcg Oral Daily     Continuous Infusions:  PRN Meds:.acetaminophen, albuterol, alum & mag hydroxide-simethicone, hydrOXYzine, nicotine, OLANZapine **OR** OLANZapine, senna-docusate, traZODone    Medication adherence issues: MS Med Adherence Y/N: No  Medication side effects: MEDICATION SIDE EFFECTS: no side effects reported  Benefit: Yes / No: Yes       ROS   A 10-point review of systems was completed and is otherwise negative with the exception of HPI.       MENTAL STATUS EXAM   Vitals: /66 (BP Location: Left arm, Patient Position: Sitting, Cuff Size: Adult Large)   Pulse 86   Temp 97.8  F (36.6  C) (Oral)   Resp 16    "Ht 1.83 m (6' 0.05\")   Wt (!) 167 kg (368 lb 2.7 oz)   SpO2 94%   BMI 49.87 kg/m      Appearance:  Casually groomed  Mood:  {Mood: Anxious  and Euthymic  Affect: full range  was congruent to speech  Suicidal Ideation: PRESENT / ABSENT: absent   Homicidal Ideation: PRESENT / ABSENT: absent   Thought process: concrete   Thought content: endorses delusions and paranoid ideation.   Fund of Knowledge: Below average  Attention/Concentration: Fair  Language ability:  Intact  Memory:  Immediate recall intact, Short-term memory intact and Long-term memory intact  Insight:  limited.  Judgement: limited  Orientation: Yes, x4  Psychomotor Behavior: denies tardive dyskinesia, dystonia or tics  Muscle Strength and Tone: MuscleStrength: Normal  Gait and Station: Normal       LABS   personally reviewed.     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)    Active Problem List:  Patient Active Problem List   Diagnosis     Speech/language delay     Tobacco use disorder     Nocturnal enuresis     Moderate major depression (H)     LOREN (obstructive sleep apnea)     Essential hypertension     Morbid obesity (H)     Leukocytosis     Suicidal ideation     Chronic low back pain     Bilateral low back pain with left-sided sciatica     History of colonic polyps     Mild intellectual disability     S/P total hip arthroplasty     Vitamin D deficiency     LPRD (laryngopharyngeal reflux disease)     Patel's esophagus determined by biopsy     Mild intermittent asthma     Somatic dysfunction of lumbar region     Somatic dysfunction of sacral region     Sacral pain     Thoracic segment dysfunction     Hip pain, left     Duodenitis     Overactive bladder     Venous stasis dermatitis of both lower extremities     Severe recurrent major depressive disorder with psychotic features (H)     Alcohol use disorder, mild, abuse     " Cannabis use disorder, mild, abuse     Mood change          PLAN   1. Ongoing education given regarding diagnostic and treatment options with risks, benefits and alternatives and adequate verbalization of understanding.  2. Medications: Cymbalta 120 mg                           Zyprexa 10 mg starting 4/27/22  3) Hospitalist consult: Hospitalist to see patient as needed  4) Legal: Voluntary  5)  to follow regarding collecting and reviewing collateral information, referrals and disposition planning     Risk Assessment: [facility] RISK ASSESSMENT: Patient able to contract for safety    Coordination of Care:   Treatment Plan reviewed and physician signed, Care discussed with Care/Treatment Team Members, Chart reviewed and Patient seen      Re-Certification I certify that the inpatient psychiatric facility services furnished since the previous certification were, and continue to be, medically necessary for, either, treatment which could reasonably be expected to improve the patient s condition or diagnostic study and that the hospital records indicate that the services furnished were, either, intensive treatment services, admission and related services necessary for diagnostic study, or equivalent services.     I certify that the patient continues to need, on a daily basis, active treatment furnished directly by or requiring the supervision of inpatient psychiatric facility personnel.   I estimate 5-7 days of hospitalization is necessary for proper treatment of the patient. My plans for post-hospital care for this patient are  adult foster care     LAMBERT Zhu CNP    -     4/29/2022     -     1:21 PM    Total time  25 minutes with > 50%spent on coordination of cares and psycho-education.    This note was created with help of Dragon dictation system. Grammatical / typing errors are not intentional.    LAMBERT Zhu CNP

## 2022-04-29 NOTE — PLAN OF CARE
Problem: Behavioral Health Plan of Care  Goal: Plan of Care Review  Outcome: Ongoing, Progressing  Flowsheets (Taken 4/28/2022 1600)  Plan of Care Reviewed With: patient  Patient Agreement with Plan of Care: agrees     Problem: Suicidal Behavior  Goal: Suicidal Behavior is Absent or Managed  Outcome: Ongoing, Progressing   Goal Outcome Evaluation:    Plan of Care Reviewed With: patient        Pt visible on the unit. Blunted, flat. Not engaged with peers.  Denies anxiety, depression, SI/HI  and all other psych symptoms. No c/o pain . Compliant with medication and contracted for safety. Guardian/mother visit this evening.

## 2022-04-29 NOTE — PROGRESS NOTES
7 PM  to 7 AM RT NOTE:    Brought hospital CPAP to the room and pt placed himself on the at 2220. Settings: Auto tirating 5-20, RA, to a large over the nose mask. Pt reports no issues with machine or mask on prior nights. RT to follow.

## 2022-04-29 NOTE — PLAN OF CARE
Assessment/Intervention/Current Symptoms and Care Coordination  Writer met with patient to discuss discharge. Patient was lying down in bed. He stated that he was nt sleeping. Responses were flat and minimal. He stated that the provider told him he could not discharge. Writer re-iterated the discharge plan for IRTS. Writer and patient talked about the benefits of having a stable place to go and stabilizing on medications. Writer encouraged patient to attend groups and participate in unit activities. Patient is aware that he can reach out to staff if he is feeling agitated.    Guardian has signed SATNAM's for IRTS referrals and patient's community partners.    Discharge Plan or Goal  TBD- cannot return to group home. [facility] Vs [facility]S      Barriers to Discharge   Symptom stabilization, medication management care coordination      Referral Status Pending  Referrals:    [facility] all locations faxed 4/28 fax  phone central access [phone]    [facility]  [facility] faxed 4/28 fax  phone   [facility] fax  phone     [facility] [facility] faxed 4/28  Fax  phone     Patient had been at [facility]([place] crisis respite in the past, CM does not believe he can return.  P [phone] F [phone] [email or web address].Left crisis residence on February 28th.     Therapist:  Kulwant Hernandezx2/wk and Kathrynx1/wk. Location: [place]. Date of last visit: unknown. Frequency: see before. Perceived helpfulness: helpful.    Psychiatrist: Traci Crespo. Location: [place]. Date of last visit: 3/16/2022. Frequency: 1 time a month. Perceived helpfulness: helpful.    :  [facility]. Location: [place] [phone]. Date of last visit: unknown. Frequency: unknown. Perceived helpfulness: unknown.    Kyle Gazron [phone] and sister Traci Greenwood [phone].     Primary Care Provider: Kory Hudson,  MD. Location: unknown. Date of last visit: unknown. Frequency: unknown. Perceived helpfulness: helpful    Legal Status  voluntary      Cintia Moreno, [facility], [facility], 4/27/2022, 8:51 AM

## 2022-04-29 NOTE — PLAN OF CARE
Problem: Suicidal Behavior  Goal: Suicidal Behavior is Absent or Managed  Outcome: Ongoing, Progressing     Problem: Violence Risk or Actual  Goal: Anger and Impulse Control  Outcome: Ongoing, Progressing   Goal Outcome Evaluation:    Plan of Care Reviewed With: patient      Pt presents flat and blunted all shift. Not engaged with peers or staff. Patient met with provider and was asking to be transferred to [facility], when told that will not be possible at this time. Patient walked to room and banged the door, few seconds later, pt out with his CPAP machine and slammed  it on the floor and stated I need to get out of here now then headed to his room and banged the door again. Prn Zyprexa PO given with security present. Denied pain and all other psych symptoms and contracted for safety. Ate 100% both meals and hydrating well. Patient currently on medical bed due to respiratory problems. Will continue plan of care.

## 2022-04-29 NOTE — PLAN OF CARE
Problem: Behavioral Health Plan of Care  Goal: Plan of Care Review  Outcome: Ongoing, Progressing  Flowsheets  Taken 4/29/2022 1723  Plan of Care Reviewed With: patient  Overall Patient Progress: improving  Patient Agreement with Plan of Care: agrees  Taken 4/29/2022 1700  Plan of Care Reviewed With: patient  Patient Agreement with Plan of Care: agrees  Goal: Patient-Specific Goal (Individualization)  Outcome: Ongoing, Progressing  Flowsheets (Taken 4/29/2022 1723)  Patient Vulnerabilities:    limited social skills    lacks insight into illness  Patient Personal Strengths:    self-awareness    medication/treatment adherence    expressive of needs    good impulse control  Goal: Adheres to Safety Considerations for Self and Others  Outcome: Ongoing, Progressing  Flowsheets (Taken 4/29/2022 1723)  Adheres to Safety Considerations for Self and Others: making progress toward outcome  Intervention: Develop and Maintain Individualized Safety Plan  Recent Flowsheet Documentation  Taken 4/29/2022 1700 by Janny Boland RN  Safety Measures:    self-directed behavior promoted    safety rounds completed  Goal: Absence of New-Onset Illness or Injury  Outcome: Ongoing, Progressing  Intervention: Identify and Manage Fall Risk  Recent Flowsheet Documentation  Taken 4/29/2022 1700 by Janny Boland RN  Safety Measures:    self-directed behavior promoted    safety rounds completed   Goal Outcome Evaluation:    Plan of Care Reviewed With: patient     Overall Patient Progress: improving       Pt intermittently seen on milieu, affect flat, behavior pleasant/cooperative. Pt tends to keep to self even when on unit common areas. Communicates only when necessary. Pt denies SI/HI, A/VH, anxiety and depression.  Pt contracts for safety.Endorsed bilateral knee and lower back pain. Scheduled Naproxen given., Pt reported intervention wasn't effective.  Good appetite /sufficient intake noted with evening meal. Pt did not attend group. Pt is  medication compliant, monitoring continues with plan of care. Pt in bed New CPAP delivered  by RT, currently on /running.

## 2022-04-29 NOTE — PLAN OF CARE
Problem: Behavioral Health Plan of Care  Goal: Adheres to Safety Considerations for Self and Others  Outcome: Ongoing, Progressing     Problem: Sleep Disturbance  Goal: Adequate Sleep/Rest  Outcome: Ongoing, Progressing     No c/o pain or discomfort this shift. Pt. Slept greater than 7 hours. Pt. Used CPAP this shift. Safety checks completed every 15 minutes per policy. Suicide Precautions continued. No suicidal ideation reported this shift.

## 2022-04-30 PROCEDURE — 250N000013 HC RX MED GY IP 250 OP 250 PS 637: Performed by: PSYCHIATRY & NEUROLOGY

## 2022-04-30 PROCEDURE — 250N000013 HC RX MED GY IP 250 OP 250 PS 637: Performed by: NURSE PRACTITIONER

## 2022-04-30 PROCEDURE — 999N000157 HC STATISTIC RCP TIME EA 10 MIN

## 2022-04-30 PROCEDURE — 94660 CPAP INITIATION&MGMT: CPT

## 2022-04-30 PROCEDURE — 250N000013 HC RX MED GY IP 250 OP 250 PS 637

## 2022-04-30 PROCEDURE — 128N000001 HC R&B CD/MH ADULT

## 2022-04-30 RX ADMIN — CALCIUM POLYCARBOPHIL 1250 MG: 625 TABLET, FILM COATED ORAL at 08:18

## 2022-04-30 RX ADMIN — NICOTINE POLACRILEX 2 MG: 2 LOZENGE ORAL at 20:15

## 2022-04-30 RX ADMIN — NAPROXEN SODIUM 440 MG: 220 TABLET, FILM COATED ORAL at 17:16

## 2022-04-30 RX ADMIN — OXYBUTYNIN CHLORIDE 30 MG: 10 TABLET, EXTENDED RELEASE ORAL at 08:17

## 2022-04-30 RX ADMIN — PANTOPRAZOLE SODIUM 40 MG: 40 TABLET, DELAYED RELEASE ORAL at 08:17

## 2022-04-30 RX ADMIN — NICOTINE POLACRILEX 2 MG: 2 LOZENGE ORAL at 10:00

## 2022-04-30 RX ADMIN — MIRABEGRON 50 MG: 25 TABLET, FILM COATED, EXTENDED RELEASE ORAL at 08:17

## 2022-04-30 RX ADMIN — NICOTINE POLACRILEX 2 MG: 2 LOZENGE ORAL at 21:30

## 2022-04-30 RX ADMIN — PRAZOSIN HYDROCHLORIDE 3 MG: 1 CAPSULE ORAL at 20:07

## 2022-04-30 RX ADMIN — HYDROCHLOROTHIAZIDE 25 MG: 25 TABLET ORAL at 08:18

## 2022-04-30 RX ADMIN — CALCIUM POLYCARBOPHIL 1250 MG: 625 TABLET, FILM COATED ORAL at 20:07

## 2022-04-30 RX ADMIN — LISINOPRIL 10 MG: 10 TABLET ORAL at 08:18

## 2022-04-30 RX ADMIN — DULOXETINE 120 MG: 60 CAPSULE, DELAYED RELEASE ORAL at 08:18

## 2022-04-30 RX ADMIN — NAPROXEN SODIUM 440 MG: 220 TABLET, FILM COATED ORAL at 08:17

## 2022-04-30 RX ADMIN — Medication 50 MCG: at 08:18

## 2022-04-30 RX ADMIN — OLANZAPINE 10 MG: 10 TABLET, ORALLY DISINTEGRATING ORAL at 20:07

## 2022-04-30 RX ADMIN — HYDROXYZINE HYDROCHLORIDE 25 MG: 25 TABLET ORAL at 08:18

## 2022-04-30 RX ADMIN — ACETAMINOPHEN 650 MG: 325 TABLET ORAL at 10:21

## 2022-04-30 RX ADMIN — OLANZAPINE 10 MG: 10 TABLET, FILM COATED ORAL at 11:26

## 2022-04-30 ASSESSMENT — ACTIVITIES OF DAILY LIVING (ADL)
LAUNDRY: WITH SUPERVISION
ORAL_HYGIENE: INDEPENDENT
ORAL_HYGIENE: INDEPENDENT
DRESS: INDEPENDENT
HYGIENE/GROOMING: INDEPENDENT
HYGIENE/GROOMING: INDEPENDENT
DRESS: INDEPENDENT
LAUNDRY: WITH SUPERVISION

## 2022-04-30 NOTE — PLAN OF CARE
Problem: Suicidal Behavior  Goal: Suicidal Behavior is Absent or Managed  Outcome: Ongoing, Progressing     Problem: Violence Risk or Actual  Goal: Anger and Impulse Control  Outcome: Ongoing, Progressing  Intervention: Promote Self-Control  Recent Flowsheet Documentation  Taken 4/30/2022 0941 by Nisreen Soria RN  Supportive Measures: active listening utilized     Problem: Pain Acute  Goal: Acceptable Pain Control and Functional Ability  Outcome: Ongoing, Progressing  Intervention: Develop Pain Management Plan  Recent Flowsheet Documentation  Taken 4/30/2022 1021 by Nisreen Soria RN  Pain Management Interventions: medication (see MAR)  Intervention: Prevent or Manage Pain  Recent Flowsheet Documentation  Taken 4/30/2022 0941 by Nisreen Soria RN  Medication Review/Management: medications reviewed  Intervention: Optimize Psychosocial Wellbeing  Recent Flowsheet Documentation  Taken 4/30/2022 0941 by Nisreen Soria RN  Supportive Measures: active listening utilized     Problem: Activity and Energy Impairment (Anxiety Signs/Symptoms)  Goal: Optimized Energy Level (Anxiety Signs/Symptoms)  Outcome: Ongoing, Progressing  Intervention: Optimize Energy Level  Recent Flowsheet Documentation  Taken 4/30/2022 0941 by Nisreen Soria RN  Activity (Behavioral Health):   activity encouraged   up ad leanna   Goal Outcome Evaluation:    Plan of Care Reviewed With: patient      Pt flat and blunted affect all shift, came to nurses station few times, enquiring about discharge today,  when staff told him not today, pt was still insisting and stated parents aware of him leaving the hospital . Charge nurse called mom for clarification. Pt observed keeping to himself, got irritated when told he can't be discharged, prn atarax given with minimal effect, later given Zyprexa  PO which was helpful. Patient endorsed anxiety, 7/10, denied depression, SI/SIB visuals and auditory hallucinations  and contracted for safety. Prn tylenol given for hip /bilateral knee pain with relief.  SAXENDA  medication not given today, mom will bring tomorrow. Will continue plan of care.

## 2022-04-30 NOTE — PLAN OF CARE
Problem: Behavioral Health Plan of Care  Goal: Adheres to Safety Considerations for Self and Others  Outcome: Ongoing, Not Progressing  Intervention: Develop and Maintain Individualized Safety Plan  Recent Flowsheet Documentation  Taken 4/30/2022 0126 by Sally Martínez, RN  Safety Measures: environmental rounds completed     Problem: Sleep Disturbance  Goal: Adequate Sleep/Rest  Outcome: Ongoing, Progressing  Intervention: Promote Sleep/Rest  Recent Flowsheet Documentation  Taken 4/30/2022 0126 by Sally Martínez RN  Sleep/Rest Enhancement: comfort measures   Goal Outcome Evaluation:           Patient had more than 6 hrs of sleep during this shift. Safety interventions maintained per protocol. No suicide behaviors noted. Will continue to monitor.

## 2022-04-30 NOTE — PLAN OF CARE
04/30/22 1256   Group Therapy Session   Group Attendance refused to attend group session   Time Session Began 1000   Time Session Ended 1100   Total Time patient participated (minutes) 0   Total # Attendees 6   Group Type psychoeducation   Group Topic Covered emotions/expression   Group Session Detail Living CBT-Pl[employer] Seeds for Creating Confidence

## 2022-04-30 NOTE — PROGRESS NOTES
7 PM to 7 AM RT NOTE:    Pt placed on hospital Auto titrating CPAP, RA, to a large over the nose mask at 2315. Pts family to bring in pts home unit Sunday. 4500 staff to place CPAP unit in the med room in the am when pt wakes. RT to follow

## 2022-05-01 PROCEDURE — 250N000013 HC RX MED GY IP 250 OP 250 PS 637: Performed by: PSYCHIATRY & NEUROLOGY

## 2022-05-01 PROCEDURE — 250N000013 HC RX MED GY IP 250 OP 250 PS 637: Performed by: NURSE PRACTITIONER

## 2022-05-01 PROCEDURE — 128N000001 HC R&B CD/MH ADULT

## 2022-05-01 PROCEDURE — 94660 CPAP INITIATION&MGMT: CPT

## 2022-05-01 PROCEDURE — 99232 SBSQ HOSP IP/OBS MODERATE 35: CPT | Performed by: NURSE PRACTITIONER

## 2022-05-01 PROCEDURE — 999N000157 HC STATISTIC RCP TIME EA 10 MIN

## 2022-05-01 PROCEDURE — 250N000013 HC RX MED GY IP 250 OP 250 PS 637

## 2022-05-01 RX ADMIN — NAPROXEN SODIUM 440 MG: 220 TABLET, FILM COATED ORAL at 08:18

## 2022-05-01 RX ADMIN — NICOTINE POLACRILEX 2 MG: 2 LOZENGE ORAL at 14:45

## 2022-05-01 RX ADMIN — CALCIUM POLYCARBOPHIL 1250 MG: 625 TABLET, FILM COATED ORAL at 20:33

## 2022-05-01 RX ADMIN — PANTOPRAZOLE SODIUM 40 MG: 40 TABLET, DELAYED RELEASE ORAL at 08:19

## 2022-05-01 RX ADMIN — NICOTINE POLACRILEX 2 MG: 2 LOZENGE ORAL at 20:32

## 2022-05-01 RX ADMIN — NICOTINE POLACRILEX 2 MG: 2 LOZENGE ORAL at 08:18

## 2022-05-01 RX ADMIN — DULOXETINE 120 MG: 60 CAPSULE, DELAYED RELEASE ORAL at 08:18

## 2022-05-01 RX ADMIN — CALCIUM POLYCARBOPHIL 1250 MG: 625 TABLET, FILM COATED ORAL at 08:18

## 2022-05-01 RX ADMIN — NICOTINE POLACRILEX 2 MG: 2 LOZENGE ORAL at 12:03

## 2022-05-01 RX ADMIN — NICOTINE POLACRILEX 2 MG: 2 LOZENGE ORAL at 16:54

## 2022-05-01 RX ADMIN — LISINOPRIL 10 MG: 10 TABLET ORAL at 08:19

## 2022-05-01 RX ADMIN — MIRABEGRON 50 MG: 25 TABLET, FILM COATED, EXTENDED RELEASE ORAL at 08:19

## 2022-05-01 RX ADMIN — OLANZAPINE 10 MG: 10 TABLET, ORALLY DISINTEGRATING ORAL at 20:33

## 2022-05-01 RX ADMIN — OXYBUTYNIN CHLORIDE 30 MG: 10 TABLET, EXTENDED RELEASE ORAL at 08:18

## 2022-05-01 RX ADMIN — PRAZOSIN HYDROCHLORIDE 3 MG: 1 CAPSULE ORAL at 20:34

## 2022-05-01 RX ADMIN — HYDROCHLOROTHIAZIDE 25 MG: 25 TABLET ORAL at 08:19

## 2022-05-01 RX ADMIN — Medication 50 MCG: at 08:19

## 2022-05-01 RX ADMIN — NAPROXEN SODIUM 440 MG: 220 TABLET, FILM COATED ORAL at 16:53

## 2022-05-01 ASSESSMENT — ACTIVITIES OF DAILY LIVING (ADL)
LAUNDRY: WITH SUPERVISION
ORAL_HYGIENE: INDEPENDENT
HYGIENE/GROOMING: INDEPENDENT
DRESS: INDEPENDENT
DRESS: INDEPENDENT
HYGIENE/GROOMING: INDEPENDENT
ORAL_HYGIENE: INDEPENDENT
LAUNDRY: WITH SUPERVISION

## 2022-05-01 NOTE — PLAN OF CARE
Problem: Suicidal Behavior  Goal: Suicidal Behavior is Absent or Managed  Outcome: Ongoing, Progressing     Problem: Violence Risk or Actual  Goal: Anger and Impulse Control  Outcome: Ongoing, Progressing     Problem: Pain Acute  Goal: Acceptable Pain Control and Functional Ability  Outcome: Ongoing, Progressing     Problem: Activity and Energy Impairment (Anxiety Signs/Symptoms)  Goal: Optimized Energy Level (Anxiety Signs/Symptoms)  Outcome: Ongoing, Progressing  Intervention: Optimize Energy Level  Recent Flowsheet Documentation  Taken 5/1/2022 0912 by Nisreen Soria RN  Activity (Behavioral Health):   activity adjusted per tolerance   up ad leanna   Goal Outcome Evaluation:    Plan of Care Reviewed With: patient   Patient visible on the unit, watched TV by himself in the lounge, no interactions with peers noted. Writer encouraged him to socialize with peers but pt declined. Denied pain and all psych symptoms and contracted for safety. Ate 100% both meals with adequate fluids intake. No behaviors observed. Per pt, mom will be visiting this evening. Will continue plan of care.

## 2022-05-01 NOTE — PLAN OF CARE
Problem: Sleep Disturbance  Goal: Adequate Sleep/Rest  Outcome: Ongoing, Progressing   Goal Outcome Evaluation:    Plan of Care Reviewed With: patient   Patent remain on medical bed due to respiratory issues,

## 2022-05-01 NOTE — PLAN OF CARE
Pt is using hospital Respironics CPAP unit for the night. Pt is independent using CPAP, per RN. Pt's family will bring home CPAP for tonight.

## 2022-05-01 NOTE — PLAN OF CARE
Problem: Sleep Disturbance  Goal: Adequate Sleep/Rest  Outcome: Ongoing, Progressing   Goal Outcome Evaluation:    Plan of Care Reviewed With: patient   Patient remain on medical bed due to respiratory needs.

## 2022-05-01 NOTE — PROGRESS NOTES
05/01/22 1049   Engagement   Intervention Group   Topic Detail OT: Creative expressions (wall hangings day 4) to increase concentration, focus, attention to task/detail, planning, problem solving, creative expression, symptom management, coping with stress, healthy leisure engagement, healthy distraction engagement, and social engagement   Attendance Did not attend

## 2022-05-01 NOTE — PROGRESS NOTES
"PSYCHIATRY  PROGRESS NOTE     DATE OF SERVICE   2022         CHIEF COMPLAINT   \"My DD waiver will be \"       SUBJECTIVE   Nursing reports:  Patient has been intentionally giving false information regarding his mother's position about discharge, saying mother is okay with him discharging to his father now which is completely not true as mother clarified, saying she never informed patient he can be discharged to his father. Going to his dad's or hotel is not an option per his mother. Affect remained blunted, flat. He denies psych symptoms. He is med compliant. Mother is visiting this evening.      reports: Patient was reviewed with the social worke. Patient is aware about the discharge plan of going to an [facility] or [facility] when opening becomes available. Referrals to IRTS  faxed today     OBJECTIVE   Chart reviewed and patient assessed. Patient was seen and evaluated in his room by himself. Upon patient interview, patient reports the weekend is long and very boring, saying he would like to be discharged to his father since his mother has agreed that he can live with his father pending availability of opening at [facility] bed. He states his DD Waiver will be  if he continues to remain in the hospital for the next 2 weeks. Patient states he has not had any thoughts of him having terminal illness for about 6 days now, saying he is no longer paranoid about his physical health. He denies both suicidal and homicidal ideations. He also denies both auditory and visual hallucinations. Writer informed patient that he just spoke with his mother/guardian as she insisted patient is not able to live with any of the family members including his father at this time, stating she  never told patient he can stay with his dad. The guardian continues to recommend continued hospital stay for further symptoms stabilization and medication management pending availability of opening at the [facility] respite. " "Mother also stated patient's DD waiver will not be  if he continues to remain in the hospital for the next 2-3 weeks provided he is discharging to the [facility] bed and NOT the [facility]. Patient is planning to bring patient's Saxenda injection while coming for visit this evening.        MEDICATIONS   Medications:  Scheduled Meds:    calcium polycarbophil  1,250 mg Oral BID     DULoxetine  120 mg Oral Daily     hydrochlorothiazide  25 mg Oral Daily     liraglutide - Weight Management  3 mg Subcutaneous Daily     lisinopril  10 mg Oral QAM     mirabegron  50 mg Oral Daily     naproxen sodium  440 mg Oral BID w/meals     OLANZapine zydis  10 mg Oral At Bedtime     oxybutynin ER  30 mg Oral Daily     pantoprazole  40 mg Oral QAM AC     prazosin  3 mg Oral At Bedtime     vitamin D3  50 mcg Oral Daily     Continuous Infusions:  PRN Meds:.acetaminophen, albuterol, alum & mag hydroxide-simethicone, hydrOXYzine, nicotine, OLANZapine **OR** OLANZapine, senna-docusate, traZODone    Medication adherence issues: MS Med Adherence Y/N: No  Medication side effects: MEDICATION SIDE EFFECTS: no side effects reported  Benefit: Yes / No: Yes       ROS   A 10-point review of systems was completed and is otherwise negative with the exception of HPI.       MENTAL STATUS EXAM   Vitals: /57   Pulse 82   Temp 98  F (36.7  C) (Oral)   Resp 18   Ht 1.83 m (6' 0.05\")   Wt (!) 167 kg (368 lb 2.7 oz)   SpO2 97%   BMI 49.87 kg/m      Appearance:  Casually groomed  Mood:  {Mood: Anxious  and Euthymic  Affect: full range  was congruent to speech  Suicidal Ideation: PRESENT / ABSENT: absent   Homicidal Ideation: PRESENT / ABSENT: absent   Thought process: concrete   Thought content: endorses delusions and paranoid ideation.   Fund of Knowledge: Below average  Attention/Concentration: Fair  Language ability:  Intact  Memory:  Immediate recall intact, Short-term memory intact and Long-term memory intact  Insight:  limited.  Judgement: " limited  Orientation: Yes, x4  Psychomotor Behavior: denies tardive dyskinesia, dystonia or tics  Muscle Strength and Tone: MuscleStrength: Normal  Gait and Station: Normal       LABS   personally reviewed.     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)    Active Problem List:  Patient Active Problem List   Diagnosis     Speech/language delay     Tobacco use disorder     Nocturnal enuresis     Moderate major depression (H)     LOREN (obstructive sleep apnea)     Essential hypertension     Morbid obesity (H)     Leukocytosis     Suicidal ideation     Chronic low back pain     Bilateral low back pain with left-sided sciatica     History of colonic polyps     Mild intellectual disability     S/P total hip arthroplasty     Vitamin D deficiency     LPRD (laryngopharyngeal reflux disease)     Patel's esophagus determined by biopsy     Mild intermittent asthma     Somatic dysfunction of lumbar region     Somatic dysfunction of sacral region     Sacral pain     Thoracic segment dysfunction     Hip pain, left     Duodenitis     Overactive bladder     Venous stasis dermatitis of both lower extremities     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 Ongoing education given regarding diagnostic and treatment options with risks, benefits and alternatives and adequate verbalization of understanding.  2. Medications: Cymbalta 120 mg                           Zyprexa 10 mg starting 4/27/22  3) Hospitalist consult: Hospitalist to see patient as needed  4) Legal: Voluntary  5)  to follow regarding collecting and reviewing collateral information, referrals and disposition planning     Risk Assessment: [facility] RISK ASSESSMENT: Patient able to contract for  safety    Coordination of Care:   Treatment Plan reviewed and physician signed, Care discussed with Care/Treatment Team Members, Chart reviewed and Patient seen      Re-Certification I certify that the inpatient psychiatric facility services furnished since the previous certification were, and continue to be, medically necessary for, either, treatment which could reasonably be expected to improve the patient s condition or diagnostic study and that the hospital records indicate that the services furnished were, either, intensive treatment services, admission and related services necessary for diagnostic study, or equivalent services.     I certify that the patient continues to need, on a daily basis, active treatment furnished directly by or requiring the supervision of inpatient psychiatric facility personnel.   I estimate 5-7 days of hospitalization is necessary for proper treatment of the patient. My plans for post-hospital care for this patient are  adult foster care     LAMBERT Zhu CNP    -     5/1/2022     -     12:21 PM    Total time  25 minutes with > 50%spent on coordination of cares and psycho-education.    This note was created with help of Dragon dictation system. Grammatical / typing errors are not intentional.    LAMBERT Zhu CNP

## 2022-05-01 NOTE — PLAN OF CARE
Problem: Suicidal Behavior  Goal: Suicidal Behavior is Absent or Managed  4/30/2022 2113 by Nisreen Soria RN  Outcome: Ongoing, Progressing  4/30/2022 1432 by Nisreen Soria RN  Outcome: Ongoing, Progressing     Problem: Violence Risk or Actual  Goal: Anger and Impulse Control  Outcome: Ongoing, Progressing  Intervention: Promote Self-Control  Recent Flowsheet Documentation  Taken 4/30/2022 1629 by Nisreen Soria RN  Supportive Measures: active listening utilized  Taken 4/30/2022 0941 by Nisreen Soria RN  Supportive Measures: active listening utilized     Problem: Pain Acute  Goal: Acceptable Pain Control and Functional Ability  4/30/2022 2113 by Nisreen Soria RN  Outcome: Ongoing, Progressing  4/30/2022 1433 by Nisreen Soria RN  Outcome: Ongoing, Progressing  Intervention: Develop Pain Management Plan  Recent Flowsheet Documentation  Taken 4/30/2022 1021 by Nisreen Soria RN  Pain Management Interventions: medication (see MAR)  Intervention: Prevent or Manage Pain  Recent Flowsheet Documentation  Taken 4/30/2022 1629 by Nisreen Soria RN  Medication Review/Management: medications reviewed  Taken 4/30/2022 0941 by Nisreen Soria RN  Medication Review/Management: medications reviewed  Intervention: Optimize Psychosocial Wellbeing  Recent Flowsheet Documentation  Taken 4/30/2022 1629 by Nisreen Soria RN  Supportive Measures: active listening utilized  Taken 4/30/2022 0941 by Nisreen Soria RN  Supportive Measures: active listening utilized     Problem: Activity and Energy Impairment (Anxiety Signs/Symptoms)  Goal: Optimized Energy Level (Anxiety Signs/Symptoms)  4/30/2022 2113 by Nisreen Soria RN  Outcome: Ongoing, Progressing  4/30/2022 1433 by Nisreen Soria RN  Outcome: Ongoing, Progressing  Intervention: Optimize Energy Level  Recent Flowsheet Documentation  Taken 4/30/2022 1629  by Nisreen Soria, RN  Activity (Behavioral Health):   activity encouraged   up ad leanna  Taken 4/30/2022 0941 by Nisreen Soria, RN  Activity (Behavioral Health):   activity encouraged   up ad leanna   Goal Outcome Evaluation:    Plan of Care Reviewed With: patient   Patient had a good evening, compliant with medications. Denied pain and all psych symptoms and contracted for safety. Ate 100% of dinner with adequate fluids intake. No behaviors. SAXENDA  medication not given today, mom will bring tomorrow. Will continue plan of care.

## 2022-05-02 PROCEDURE — 250N000013 HC RX MED GY IP 250 OP 250 PS 637: Performed by: PSYCHIATRY & NEUROLOGY

## 2022-05-02 PROCEDURE — 999N000157 HC STATISTIC RCP TIME EA 10 MIN

## 2022-05-02 PROCEDURE — 250N000013 HC RX MED GY IP 250 OP 250 PS 637: Performed by: NURSE PRACTITIONER

## 2022-05-02 PROCEDURE — 128N000001 HC R&B CD/MH ADULT

## 2022-05-02 PROCEDURE — 250N000013 HC RX MED GY IP 250 OP 250 PS 637

## 2022-05-02 RX ADMIN — NAPROXEN SODIUM 440 MG: 220 TABLET, FILM COATED ORAL at 08:23

## 2022-05-02 RX ADMIN — DULOXETINE 120 MG: 60 CAPSULE, DELAYED RELEASE ORAL at 08:23

## 2022-05-02 RX ADMIN — OXYBUTYNIN CHLORIDE 30 MG: 10 TABLET, EXTENDED RELEASE ORAL at 08:23

## 2022-05-02 RX ADMIN — ACETAMINOPHEN 650 MG: 325 TABLET ORAL at 21:31

## 2022-05-02 RX ADMIN — CALCIUM POLYCARBOPHIL 1250 MG: 625 TABLET, FILM COATED ORAL at 08:23

## 2022-05-02 RX ADMIN — NICOTINE POLACRILEX 2 MG: 2 LOZENGE ORAL at 16:05

## 2022-05-02 RX ADMIN — NAPROXEN SODIUM 440 MG: 220 TABLET, FILM COATED ORAL at 17:20

## 2022-05-02 RX ADMIN — HYDROCHLOROTHIAZIDE 25 MG: 25 TABLET ORAL at 08:24

## 2022-05-02 RX ADMIN — MIRABEGRON 50 MG: 25 TABLET, FILM COATED, EXTENDED RELEASE ORAL at 08:24

## 2022-05-02 RX ADMIN — OLANZAPINE 10 MG: 10 TABLET, ORALLY DISINTEGRATING ORAL at 20:42

## 2022-05-02 RX ADMIN — Medication 50 MCG: at 08:24

## 2022-05-02 RX ADMIN — NICOTINE POLACRILEX 2 MG: 2 LOZENGE ORAL at 10:26

## 2022-05-02 RX ADMIN — LISINOPRIL 10 MG: 10 TABLET ORAL at 08:24

## 2022-05-02 RX ADMIN — CALCIUM POLYCARBOPHIL 1250 MG: 625 TABLET, FILM COATED ORAL at 20:42

## 2022-05-02 RX ADMIN — NICOTINE POLACRILEX 2 MG: 2 LOZENGE ORAL at 20:42

## 2022-05-02 RX ADMIN — NICOTINE POLACRILEX 2 MG: 2 LOZENGE ORAL at 18:26

## 2022-05-02 RX ADMIN — NICOTINE POLACRILEX 2 MG: 2 LOZENGE ORAL at 08:24

## 2022-05-02 RX ADMIN — PRAZOSIN HYDROCHLORIDE 3 MG: 1 CAPSULE ORAL at 20:42

## 2022-05-02 RX ADMIN — PANTOPRAZOLE SODIUM 40 MG: 40 TABLET, DELAYED RELEASE ORAL at 08:24

## 2022-05-02 ASSESSMENT — ACTIVITIES OF DAILY LIVING (ADL)
LAUNDRY: WITH SUPERVISION
LAUNDRY: WITH SUPERVISION
ORAL_HYGIENE: INDEPENDENT
HYGIENE/GROOMING: INDEPENDENT
DRESS: INDEPENDENT
HYGIENE/GROOMING: INDEPENDENT
ORAL_HYGIENE: INDEPENDENT
DRESS: INDEPENDENT

## 2022-05-02 NOTE — PLAN OF CARE
Assessment/Intervention/Current Symptoms and Care Coordination  Writer met with patient to discuss discharge. Patient was lying in bed utilizing his cpap. Patient did not want to take off mask at this time. Writer provided information and patient acknowledged awareness by nodding and brief verbal indicators. Patient remains isolative and has not been attending groups. He comes out on the unit for meals and often return to is room. Patient declines to socialize with peers, attend groups and has difficulty understanding discharge plan. Patient often states that he can live at his father's home however patient cannot live with family members as he requires a higher level of care.Writer spoke to Kyle. Kyle is no longer in agreement with an Irts referral. Guardian stated that he would not be able to keep his DD waiver. Case stefano had requested a referral from [facility].  Writer spoke to Mehul [facility] referrals . Mehul stated that he has sent a referral to a crisis residence in [place]. Mehul stated that he should know in one to two business days if patient has been accepted. According to Mehul [place] has bed availability.    Guardian stated that patient has a 30 day supply of medication at home and would only need to fill the olanzapine 10 mg as this was changed during this hospitalization.     Writer left a message with [address] regarding a return to crisis on 4/28, 4/29 and 05/02. [facility] returned call on 05/02 stating that they do not have bed availability this weeks they have no openings. Writer faxed requested information for potential future openings.      Discharge Plan or Goal  TBD- cannot return to group home. [facility] respite Vs IRTS      Barriers to Discharge   Symptom stabilization, medication management care coordination      Referral Status Pending. Guardian has declined IRTS referrals. [facility] has received a referral for a crisis residence contact  Mehul .      Referrals:    [facility] all locations faxed 4/28 fax  phone central access [phone]    [facility] faxed 4/28 fax  phone  left voicemail 05/02  [facility] fax  phone  left voicemail 05/02    [facility] faxed 4/28  Fax  phone     Patient had been at [facility] [facility] [place] in the past, CM does not believe he can return. [facility] [address] [place] [address] [place] [address] Tele: [phone]  Fax: [phone] Left [facility] residence on February 28th.     Therapist:  Kulwant Hernandezx2/wk and Kathrynx1/wk. Location: [facility]. Date of last visit: unknown. Frequency: see before. Perceived helpfulness: helpful.    Psychiatrist: Traci Crespo. Location: [facility]. Date of last visit: 3/16/2022. Frequency: 1 time a month. Perceived helpfulness: helpful.    :  [facility]. Location: [facility] [phone]. Date of last visit: unknown. Frequency: unknown. Perceived helpfulness: unknown.    Guardian Ryann [phone] and sister Traci Greenwood [phone].     Primary Care Provider: Kory Hudson MD. Location: unknown. Date of last visit: unknown. Frequency: unknown. Perceived helpfulness: helpful    Legal Status  voluntary      Cintia Moreno, [facility], [facility], 05/02/2022, 8:51 AM

## 2022-05-02 NOTE — PLAN OF CARE
Goal Outcome Evaluation:    Plan of Care Reviewed With: patient     Problem: Behavioral Health Plan of Care  Goal: Adheres to Safety Considerations for Self and Others  Outcome: Ongoing, Progressing  Intervention: Develop and Maintain Individualized Safety Plan  Recent Flowsheet Documentation  Taken 5/2/2022 0900 by Madhuri Babcock RN  Safety Measures: safety rounds completed     Problem: Behavioral Health Plan of Care  Goal: Absence of New-Onset Illness or Injury  Intervention: Identify and Manage Fall Risk  Recent Flowsheet Documentation  Taken 5/2/2022 0900 by Madhuri Babcock RN  Safety Measures: safety rounds completed      Patient visible during breakfast. Appeared pleasant and bright on approach. Patient denied anxiety and depression. He denied SI/HI and hallucinations. He rated pain on his hip and knee at 9/10. Patient reported that his pain is constant; not relieved by Naproxen. Patient declined extra pain intervention stating that he has high pain tolerance. He reported that he is going to have surgery on his hip soon. He was compliant with all his medications. Patient attends group activities. Patient on medical bed due to CPAP use and mobility.

## 2022-05-02 NOTE — PLAN OF CARE
"      INITIAL 3 NIGHTS HOME BIPAP/CPAP NOTE    UNIT TYPE:  Home unit  MASK TYPE  Large face mask    INITIAL SET UP:   UNIT CLEAN AND FUNCTIONAL - YES   TUBING CLEAN AND FUNCTIONAL - YES   PATIENT INTERFACE INTEGRITY HAS BEEN CHECK: YES    MASK IS CLEAN AND PLIABLE YES    MASK IS UNDAMAGED - YES    MASK HAS ADEQUATE CUSHION YES     TIME OF MASK PLACEMENT - 21:20 PM    TWO HOUR SKIN CHECK DONE AT   No redness or skin break    INTERVENTION INITIATED     No intervention needed at this time  SETTINGS:   CPAP -  Home setting    BIPAP -    02 BLED IN -      PATIENT'S TOLERANCE OF DEVICE -  YES      Pt on hIs home cpap/ra 1 st night, suraj well. bs clear/diminish. Blood pressure 115/55, pulse 78, temperature 98.1  F (36.7  C), temperature source Oral, resp. rate 18, height 1.83 m (6' 0.05\"), weight (!) 167 kg (368 lb 2.7 oz), SpO2 97 %.      Plan: keep sat >/=90% days and home cpap at night    "

## 2022-05-02 NOTE — PROGRESS NOTES
05/02/22 1436   Engagement   Intervention Group   Topic Detail Birdhouse painting for creative expression, concentration, attention to detail, relaxation, coping, symptom management, reality based activity, healthy leisure, expanding social skills and an opportunity to experience success   Attendance Did not attend

## 2022-05-02 NOTE — PROGRESS NOTES
Patient's mother came to visit patient and brought CPAP and Saxenda from home. Medication sent to pharmacy and oreder verified in Epic verified per pharmacist: Taylor. RT to verify functioning of home CPAP device.

## 2022-05-03 ENCOUNTER — APPOINTMENT (OUTPATIENT)
Dept: RADIOLOGY | Facility: CLINIC | Age: 42
End: 2022-05-03
Payer: COMMERCIAL

## 2022-05-03 LAB
ANION GAP SERPL CALCULATED.3IONS-SCNC: 10 MMOL/L (ref 5–18)
BUN SERPL-MCNC: 11 MG/DL (ref 8–22)
CALCIUM SERPL-MCNC: 9.7 MG/DL (ref 8.5–10.5)
CHLORIDE BLD-SCNC: 103 MMOL/L (ref 98–107)
CO2 SERPL-SCNC: 27 MMOL/L (ref 22–31)
CREAT SERPL-MCNC: 0.83 MG/DL (ref 0.7–1.3)
GFR SERPL CREATININE-BSD FRML MDRD: >90 ML/MIN/1.73M2
GLUCOSE BLD-MCNC: 84 MG/DL (ref 70–125)
HBA1C MFR BLD: 5.1 %
POTASSIUM BLD-SCNC: 3.9 MMOL/L (ref 3.5–5)
SARS-COV-2 RNA RESP QL NAA+PROBE: NEGATIVE
SODIUM SERPL-SCNC: 140 MMOL/L (ref 136–145)

## 2022-05-03 PROCEDURE — 250N000013 HC RX MED GY IP 250 OP 250 PS 637: Performed by: PSYCHIATRY & NEUROLOGY

## 2022-05-03 PROCEDURE — 73030 X-RAY EXAM OF SHOULDER: CPT | Mod: LT

## 2022-05-03 PROCEDURE — 36415 COLL VENOUS BLD VENIPUNCTURE: CPT

## 2022-05-03 PROCEDURE — 99232 SBSQ HOSP IP/OBS MODERATE 35: CPT

## 2022-05-03 PROCEDURE — 87635 SARS-COV-2 COVID-19 AMP PRB: CPT | Performed by: PSYCHIATRY & NEUROLOGY

## 2022-05-03 PROCEDURE — 250N000013 HC RX MED GY IP 250 OP 250 PS 637

## 2022-05-03 PROCEDURE — 128N000001 HC R&B CD/MH ADULT

## 2022-05-03 PROCEDURE — 82374 ASSAY BLOOD CARBON DIOXIDE: CPT

## 2022-05-03 PROCEDURE — 83036 HEMOGLOBIN GLYCOSYLATED A1C: CPT

## 2022-05-03 PROCEDURE — 250N000013 HC RX MED GY IP 250 OP 250 PS 637: Performed by: NURSE PRACTITIONER

## 2022-05-03 PROCEDURE — 99232 SBSQ HOSP IP/OBS MODERATE 35: CPT | Performed by: NURSE PRACTITIONER

## 2022-05-03 PROCEDURE — 82310 ASSAY OF CALCIUM: CPT

## 2022-05-03 RX ADMIN — OLANZAPINE 10 MG: 10 TABLET, ORALLY DISINTEGRATING ORAL at 20:16

## 2022-05-03 RX ADMIN — PRAZOSIN HYDROCHLORIDE 3 MG: 1 CAPSULE ORAL at 20:15

## 2022-05-03 RX ADMIN — NICOTINE POLACRILEX 2 MG: 2 LOZENGE ORAL at 20:26

## 2022-05-03 RX ADMIN — OXYBUTYNIN CHLORIDE 30 MG: 10 TABLET, EXTENDED RELEASE ORAL at 08:10

## 2022-05-03 RX ADMIN — CALCIUM POLYCARBOPHIL 1250 MG: 625 TABLET, FILM COATED ORAL at 20:16

## 2022-05-03 RX ADMIN — NAPROXEN SODIUM 440 MG: 220 TABLET, FILM COATED ORAL at 16:53

## 2022-05-03 RX ADMIN — CALCIUM POLYCARBOPHIL 1250 MG: 625 TABLET, FILM COATED ORAL at 08:12

## 2022-05-03 RX ADMIN — NICOTINE POLACRILEX 2 MG: 2 LOZENGE ORAL at 16:28

## 2022-05-03 RX ADMIN — NICOTINE POLACRILEX 2 MG: 2 LOZENGE ORAL at 08:17

## 2022-05-03 RX ADMIN — LISINOPRIL 10 MG: 10 TABLET ORAL at 08:11

## 2022-05-03 RX ADMIN — PANTOPRAZOLE SODIUM 40 MG: 40 TABLET, DELAYED RELEASE ORAL at 07:09

## 2022-05-03 RX ADMIN — NICOTINE POLACRILEX 2 MG: 2 LOZENGE ORAL at 06:30

## 2022-05-03 RX ADMIN — NICOTINE POLACRILEX 2 MG: 2 LOZENGE ORAL at 18:45

## 2022-05-03 RX ADMIN — DULOXETINE 120 MG: 60 CAPSULE, DELAYED RELEASE ORAL at 08:10

## 2022-05-03 RX ADMIN — MIRABEGRON 50 MG: 25 TABLET, FILM COATED, EXTENDED RELEASE ORAL at 08:11

## 2022-05-03 RX ADMIN — NAPROXEN SODIUM 440 MG: 220 TABLET, FILM COATED ORAL at 08:11

## 2022-05-03 RX ADMIN — Medication 50 MCG: at 08:10

## 2022-05-03 RX ADMIN — HYDROCHLOROTHIAZIDE 25 MG: 25 TABLET ORAL at 08:11

## 2022-05-03 ASSESSMENT — ACTIVITIES OF DAILY LIVING (ADL)
HYGIENE/GROOMING: SHOWER;INDEPENDENT
DRESS: SCRUBS (BEHAVIORAL HEALTH);INDEPENDENT
ORAL_HYGIENE: INDEPENDENT
LAUNDRY: WITH SUPERVISION

## 2022-05-03 NOTE — PROGRESS NOTES
05/03/22 1054   Engagement   Intervention Group   Topic Detail Tapple category activity for concentration, cognitive processing, coping, relaxation, healthy leisure, symptom management, building self esteem and socialization   Attendance Did not attend

## 2022-05-03 NOTE — PROGRESS NOTES
05/03/22 1435   Engagement   Intervention Group   Topic Detail Day #2 Birdhouses for creative expression, concentration, attention to detail, relaxation, coping, symptom management, reality based activity, healthy leisure, expanding social skills and an opportunity to experience success   Attendance Did not attend

## 2022-05-03 NOTE — PLAN OF CARE
Problem: Behavioral Health Plan of Care  Goal: Plan of Care Review  Outcome: Ongoing, Progressing     Problem: Noninvasive Ventilation Acute  Goal: Effective Unassisted Ventilation and Oxygenation  Outcome: Ongoing, Progressing     Problem: Sleep Disturbance  Goal: Adequate Sleep/Rest  Outcome: Ongoing, Progressing     Problem: Suicidal Behavior  Goal: Suicidal Behavior is Absent or Managed  Outcome: Ongoing, Progressing     Problem: Violence Risk or Actual  Goal: Anger and Impulse Control  Outcome: Ongoing, Progressing   Goal Outcome Evaluation:      Pt appears to be sleeping comfortably during all safety checks. No signs of pain, discomfort, or abnormal behaviors noted. Will continue with same plans of care.

## 2022-05-03 NOTE — PLAN OF CARE
Goal Outcome Evaluation:    Problem: Behavioral Health Plan of Care  Goal: Plan of Care Review  Outcome: Ongoing, Progressing     Problem: Sleep Disturbance  Goal: Adequate Sleep/Rest  Outcome: Ongoing, Progressing     Problem: Suicidal Behavior  Goal: Suicidal Behavior is Absent or Managed  Outcome: Ongoing, Progressing   Willard was up for meals but mostly isolative to his room. He denied all psych sx or pain or discomfort and she contracted for safety. Pt was medication compliant and he had adequate intake. Pt had xray done today for his LT shoulder which he had previously complained that he fell in the shower unwitnessed.

## 2022-05-03 NOTE — PLAN OF CARE
Assessment/Intervention/Current Symptoms and Care Coordination  Writer met with patient to discuss discharge. Patient was awake and lying on his bed. Patient declined going to groups. He was flat  And minimal in response. Patient verbally agreed to a placement in [place] if available. Clinical review process takes two days. Mehul from [facility] stated that he will contact with further information regarding acceptance of placement.    Discharge Plan or Goal  TBD- cannot return to group home. [facility]  [facility]      Barriers to Discharge   Symptom stabilization, medication management care coordination      Referral Status Pending. Guardian has declined IRTS referrals. [facility] has received a referral for a crisis residence contact Mehul .    On 05/02 Writer spoke to Mehul [facility] referrals . Mehul stated that he has sent a referral to a crisis residence in [place]. Mehul stated that he should know in one to two business days if patient has been accepted. According to Mehul [place] has bed availability.    On 05/02 Guardian stated that patient has a 30 day supply of medication at home and would only need to fill the olanzapine 10 mg as this was changed during this hospitalization.     Writer left a message with [address] regarding a return to crisis on 4/28, 4/29 and 05/02. [facility] returned call on 05/02 stating that they do not have bed availability this weeks they have no openings. Writer faxed requested information for potential future openings.      Referrals:    [facility] all locations faxed 4/28 fax  phone central access [phone]    [facility]  [facility] faxed 4/28 fax  phone  left voicemail 05/02  [facility] fax  phone  left voicemail 05/02    [place] [facility] faxed 4/28  Fax  phone     Patient had been at [facility] [facility] in  the past, CM does not believe he can return. [facility] [place] [address] [place] [address] Tele: [phone]  Fax: [phone] Left crisis residence on February 28th.     Therapist:  Kulwant Hernandezx2/wk and Kathrynx1/wk. Location: [place]. Date of last visit: unknown. Frequency: see before. Perceived helpfulness: helpful.    Psychiatrist: Traci Crespo. Location: [place]. Date of last visit: 3/16/2022. Frequency: 1 time a month. Perceived helpfulness: helpful.    :  [place]. Location: [facility] [phone]. Date of last visit: unknown. Frequency: unknown. Perceived helpfulness: unknown.    Guardian Ryann [phone] and sister Traci Greenwood [phone].     Primary Care Provider: Kory Hudson MD. Location: unknown. Date of last visit: unknown. Frequency: unknown. Perceived helpfulness: helpful    Legal Status  voluntary      Cintia Moreno, [facility], [place], 05/02/2022, 8:51 AM

## 2022-05-03 NOTE — PROGRESS NOTES
"[facility]    Medicine Progress Note - Hospitalist Service    Date of Admission:  4/25/2022    Assessment & Plan        Kimo Greenwood is a 41 year old male admitted on 4/25/2022. He has a PMH of polysubstance abuse (alcohol, tobacco, and cannabis), anxiety, PTSD, major depressive disorder, suicidal ideation, mild intellectual disability, leukocytosis, asthma, and hypertension. He presented to the ED 4/24 for evaluation of worsening paranoia.      # Fall  # Left Shoulder pain  States this happened \"a couple of days ago\". He was in the shower and slipped, hitting left shoulder on the wall, did not fall all the way to the ground. Denies hitting his head or any other area of the body. Slightly more swollen when compared to the right shoulder, pain with palpation of shoulder and AC joint. Limited ROM with arm extended and extend to the back. Normal strength. Pain with adduction and internal rotation. Mild pain with abduction and external rotation. Normal flexion. Normal bicep and tricep strength.   - XR left shoulder- some sclerosis along the margin of the greater tuberosity which is mort likely chronic and degenerative reactive but could be posttraumatic in the setting of a contusion. No fracture or dislocation. AC joint negative.   - continue using acetaminophen and ice for pain   - Active ROM shoulder exercises as tolerates    # LOREN  History of sleep apnea. Using CPAP machine at night. States he is sleeping well.   - use PTA settings and manage per respiratory     # Leukocytosis- resolved   WBC on admission 11.1, now normal at 8. Afebrile, denies fever, myalgias or chills.      # Asthma  History of mild intermittent asthma. No recent exacerbations.  - PTA albuterol inhaler, 2 puffs every 6 hours prn     # Obesity  Morbidly obese with a BMI of 50.   - A1C 5.1 last august, normal blood sugar. Should be screened every 6-12 months   - A1C 5/3/22- 5.1  - no history of high cholesterol  - " encourage to exercise, follow healthy diet, encourage weight loss  - PTA Vitamin D3 50 mcg daily     # Hypertension  Normotensive. States he is compliant with medication.   - PTA hydrochlorothiazide 25 mg daily  - PTA lisinopril 10 mg daily  - BMP check while on lisinopril and hydrochlorothiazide- normal kidney function and electrolytes  - EKG - NSR without signs of left ventricular hypertrophy, ectopy, ischemia, ST or T waves changes, or prolonged QT  - denies headache, blurry vision, chest pain, palpitations  - follow a low salt or DASH diet, increase fruits, vegetables, whole grains, maintain a healthy weight, exercise at least 30 minutes per day most days of the week     # Chronic Low Back Pain  Denies pain.   - PTA naproxen 440 mg BID with meals  - protonix 40 mg daily while on scheduled NSAID  - tylenol prn      # Tobacco addiction- nicorette lozenges prn      # Polysubstance Abuse  # MDD, Anxiety, PTSD  # Suicidal Ideation- Attempts by overdose 2005, 2015, 2018  - manage per psychiatry            Diet: Regular Diet Adult    DVT Prophylaxis: Low Risk/Ambulatory with no VTE prophylaxis indicated  Germain Catheter: Not present  Central Lines: None  Cardiac Monitoring: None  Code Status: Full Code      Disposition Plan   Expected Discharge:  TBD   Anticipated discharge location:  Awaiting care coordination huddle  Delays:   per psychiatry         The patient's care was discussed with the Patient.    LAMBERT Shay [facility]  Hospitalist Service  [facility]  Securely message with the Vocera Web Console (learn more here)  Text page via [employer] Paging/Directory         Clinically Significant Risk Factors Present on Admission                    ______________________________________________________________________    Interval History   12 point review of systems negative except for pertinent positives mentioned in the HPI and Assessment and Plan. See fall event and plan above     Data reviewed  today: I reviewed all medications, new labs and imaging results over the last 24 hours. I personally reviewed the shoulder XR image(s) showing no fracture or dislocation, some sclerosis .    Physical Exam   Vital Signs: Temp: 98.6  F (37  C) Temp src: Oral BP: 132/63     Resp: 18 SpO2: 95 % O2 Device: None (Room air)    Weight: 368 lbs 2.69 oz  Constitutional: awake, alert, cooperative, no apparent distress, and appears stated age  Skin: no bruising or bleeding  Musculoskeletal: LEFT SHOULDER:  tenderness present  Neuropsychiatric: General: normal, calm and normal eye contact    Data   Recent Results (from the past 24 hour(s))   XR Shoulder Left 2 Views    Narrative    EXAM: XR SHOULDER 2 VIEW LEFT  LOCATION: [facility]  DATE/TIME: 5/3/2022 10:39 AM    INDICATION: Fall, pain, swelling.  COMPARISON: None.      Impression    IMPRESSION: There is some sclerosis along the margin of the greater tuberosity which is most likely chronic and degenerative/reactive but could be posttraumatic in the setting of a contusion, but there is no evidence for displaced fracture. No   dislocation. The AC joint is negative.

## 2022-05-03 NOTE — PLAN OF CARE
Problem: Behavioral Health Plan of Care  Goal: Plan of Care Review  Recent Flowsheet Documentation  Taken 5/2/2022 1600 by Rupal Forrest RN  Plan of Care Reviewed With: patient  Patient Agreement with Plan of Care: agrees     Problem: Behavioral Health Plan of Care  Goal: Optimal Comfort and Wellbeing  Outcome: Ongoing, Progressing     Problem: Sleep Disturbance  Goal: Adequate Sleep/Rest  Intervention: Promote Sleep/Rest  Recent Flowsheet Documentation  Taken 5/2/2022 1600 by Rupal Forrest RN  Sleep/Rest Enhancement: comfort measures     Problem: Pain Acute  Goal: Acceptable Pain Control and Functional Ability  Intervention: Prevent or Manage Pain  Recent Flowsheet Documentation  Taken 5/2/2022 1600 by Rupal Forrest RN  Sleep/Rest Enhancement: comfort measures  Complementary Therapy: essential oils utilized     Problem: Activity and Energy Impairment (Anxiety Signs/Symptoms)  Goal: Optimized Energy Level (Anxiety Signs/Symptoms)  Intervention: Optimize Energy Level  Recent Flowsheet Documentation  Taken 5/2/2022 1600 by Rupal Forrest RN  Activity (Behavioral Health): up ad leanna     Problem: Social, Occupational or Functional Impairment (Anxiety Signs/Symptoms)  Goal: Enhanced Social, Occupational or Functional Skills (Anxiety Signs/Symptoms)  Intervention: Promote Social, Occupational and Functional Ability  Recent Flowsheet Documentation  Taken 5/2/2022 1600 by Rupal Forrest RN  Social Functional Ability Promotion:   autonomy promoted   opportunity for activity provided     Problem: Somatic Disturbance (Anxiety Signs/Symptoms)  Goal: Improved Somatic Symptoms (Anxiety Signs/Symptoms)  Intervention: Minimize Somatic Disturbance  Recent Flowsheet Documentation  Taken 5/2/2022 1600 by Rupal Forrest RN  Complementary Therapy: essential oils utilized   Goal Outcome Evaluation:    Plan of Care Reviewed With: patient     Patient was visible on the unit and he was observed sitting away from  peers while watching TV.  He denies all psych symptoms.  He contracts for safety.  Food and fluid intake was good.  Pt reported that he fell in the shower room yesterday, but he did not report to anyone.  He endorsed shoulder pain on his left shoulder.  Ice pack was applied and tylenol was given.  There was no S.I behavior observed.

## 2022-05-03 NOTE — PROGRESS NOTES
"PSYCHIATRY  PROGRESS NOTE     DATE OF SERVICE   5/3/2022         CHIEF COMPLAINT   \"I had a good weekend\"       SUBJECTIVE   Nursing reports:  Patient mostly isolative in his room. Came out for meals and snacks. Denies all psych symptoms. XR completed for shoulder pain related to unwitnessed fall.      reports: SW met with patient to discuss discharge. Patient was awake and lying on his bed. Patient declined going to groups. He was flat  And minimal in response. Patient verbally agreed to a placement in [place] if available. Clinical review process takes two days. Mehul from [facility] stated that he will contact with further information regarding acceptance of placement.        OBJECTIVE   Chart reviewed and patient assessed. Patient was seen and evaluated in the day area by himself. Upon patient interview, patient reports he had a good weekend, stating his mother visited which he described as relieving. Patient states he is excited about the news that he has been accepted to a crises home, saying he is just waiting for the call from the crises home regarding when they can take him. Patient presents as pleasant, calm and appropriate. He denies both suicidal and homicidal ideation. He denies both auditory and visual hallucinations. No paranoid ideation observed during this assessment. Per nursing, patient reported unwitnessed fall while showering yesterday as he complained of shoulder pain. Fall protocol completed and hospitalist notified. Shoulder XR ordered and the result was WNL as no evidenced for displaced fractured noted.      MEDICATIONS   Medications:  Scheduled Meds:    calcium polycarbophil  1,250 mg Oral BID     DULoxetine  120 mg Oral Daily     hydrochlorothiazide  25 mg Oral Daily     liraglutide - Weight Management  3 mg Subcutaneous Daily     lisinopril  10 mg Oral QAM     mirabegron  50 mg Oral Daily     naproxen sodium  440 mg Oral BID w/meals     OLANZapine zydis  10 mg Oral At Bedtime " "    oxybutynin ER  30 mg Oral Daily     pantoprazole  40 mg Oral QAM AC     prazosin  3 mg Oral At Bedtime     vitamin D3  50 mcg Oral Daily     Continuous Infusions:  PRN Meds:.acetaminophen, albuterol, alum & mag hydroxide-simethicone, hydrOXYzine, nicotine, OLANZapine **OR** OLANZapine, senna-docusate, traZODone    Medication adherence issues: MS Med Adherence Y/N: No  Medication side effects: MEDICATION SIDE EFFECTS: no side effects reported  Benefit: Yes / No: Yes       ROS   A 10-point review of systems was completed and is otherwise negative with the exception of HPI.       MENTAL STATUS EXAM   Vitals: /63 (Patient Position: Sitting)   Pulse 75   Temp 98.6  F (37  C)   Resp 18   Ht 1.83 m (6' 0.05\")   Wt (!) 167 kg (368 lb 2.7 oz)   SpO2 95%   BMI 49.87 kg/m      Appearance:  Casually groomed  Mood:  {Mood: Anxious  and Euthymic  Affect: full range  was congruent to speech  Suicidal Ideation: PRESENT / ABSENT: absent   Homicidal Ideation: PRESENT / ABSENT: absent   Thought process: concrete   Thought content: denies suicidal and homicidal ideations. Denies paranoid ideation and delusion   Fund of Knowledge: Below average  Attention/Concentration: Fair  Language ability:  Intact  Memory:  Immediate recall intact, Short-term memory intact and Long-term memory intact  Insight:  limited.  Judgement: limited  Orientation: Yes, x4  Psychomotor Behavior: denies tardive dyskinesia, dystonia or tics  Muscle Strength and Tone: MuscleStrength: Normal  Gait and Station: Normal       LABS   personally reviewed.     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)    Active Problem List:  Patient Active Problem List   Diagnosis     Speech/language delay     Tobacco use disorder     Nocturnal enuresis     Moderate major depression (H)     LOREN (obstructive sleep apnea)     Essential " hypertension     Morbid obesity (H)     Leukocytosis     Suicidal ideation     Chronic low back pain     Bilateral low back pain with left-sided sciatica     History of colonic polyps     Mild intellectual disability     S/P total hip arthroplasty     Vitamin D deficiency     LPRD (laryngopharyngeal reflux disease)     Patel's esophagus determined by biopsy     Mild intermittent asthma     Somatic dysfunction of lumbar region     Somatic dysfunction of sacral region     Sacral pain     Thoracic segment dysfunction     Hip pain, left     Duodenitis     Overactive bladder     Venous stasis dermatitis of both lower extremities     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 Ongoing education given regarding diagnostic and treatment options with risks, benefits and alternatives and adequate verbalization of understanding.  2. Medications: Cymbalta 120 mg                           Zyprexa 10 mg starting 4/27/22  3) Hospitalist consult: Hospitalist to see patient as needed  4) Legal: Voluntary  5)  to follow regarding collecting and reviewing collateral information, referrals and disposition planning     Risk Assessment:  MH RISK ASSESSMENT: Patient able to contract for safety    Coordination of Care:   Treatment Plan reviewed and physician signed, Care discussed with Care/Treatment Team Members, Chart reviewed and Patient seen      Re-Certification I certify that the inpatient psychiatric facility services furnished since the previous certification were, and continue to be, medically necessary for, either, treatment which could reasonably be expected to improve the patient s condition or diagnostic study and that the hospital records indicate that the services furnished were, either, intensive treatment services, admission and related services necessary for  diagnostic study, or equivalent services.     I certify that the patient continues to need, on a daily basis, active treatment furnished directly by or requiring the supervision of inpatient psychiatric facility personnel.   I estimate 5-7 days of hospitalization is necessary for proper treatment of the patient. My plans for post-hospital care for this patient are  adult foster care     LAMBERT Zhu CNP    -     5/3/2022     -     11:38 AM    Total time  25 minutes with > 50%spent on coordination of cares and psycho-education.    This note was created with help of Dragon dictation system. Grammatical / typing errors are not intentional.    LAMBERT Zhu CNP

## 2022-05-04 PROCEDURE — 99232 SBSQ HOSP IP/OBS MODERATE 35: CPT | Performed by: NURSE PRACTITIONER

## 2022-05-04 PROCEDURE — 128N000001 HC R&B CD/MH ADULT

## 2022-05-04 PROCEDURE — 250N000013 HC RX MED GY IP 250 OP 250 PS 637

## 2022-05-04 PROCEDURE — 250N000013 HC RX MED GY IP 250 OP 250 PS 637: Performed by: PSYCHIATRY & NEUROLOGY

## 2022-05-04 PROCEDURE — 250N000013 HC RX MED GY IP 250 OP 250 PS 637: Performed by: NURSE PRACTITIONER

## 2022-05-04 PROCEDURE — 999N000157 HC STATISTIC RCP TIME EA 10 MIN

## 2022-05-04 PROCEDURE — 99207 PR NO CHARGE LOS: CPT

## 2022-05-04 RX ADMIN — NICOTINE POLACRILEX 2 MG: 2 LOZENGE ORAL at 15:22

## 2022-05-04 RX ADMIN — NAPROXEN SODIUM 440 MG: 220 TABLET, FILM COATED ORAL at 16:43

## 2022-05-04 RX ADMIN — NICOTINE POLACRILEX 2 MG: 2 LOZENGE ORAL at 08:22

## 2022-05-04 RX ADMIN — DULOXETINE 120 MG: 60 CAPSULE, DELAYED RELEASE ORAL at 08:13

## 2022-05-04 RX ADMIN — CALCIUM POLYCARBOPHIL 1250 MG: 625 TABLET, FILM COATED ORAL at 20:05

## 2022-05-04 RX ADMIN — NICOTINE POLACRILEX 2 MG: 2 LOZENGE ORAL at 18:42

## 2022-05-04 RX ADMIN — OXYBUTYNIN CHLORIDE 30 MG: 10 TABLET, EXTENDED RELEASE ORAL at 08:13

## 2022-05-04 RX ADMIN — Medication 50 MCG: at 08:13

## 2022-05-04 RX ADMIN — CALCIUM POLYCARBOPHIL 1250 MG: 625 TABLET, FILM COATED ORAL at 08:13

## 2022-05-04 RX ADMIN — OLANZAPINE 10 MG: 10 TABLET, ORALLY DISINTEGRATING ORAL at 20:05

## 2022-05-04 RX ADMIN — NAPROXEN SODIUM 440 MG: 220 TABLET, FILM COATED ORAL at 08:13

## 2022-05-04 RX ADMIN — PRAZOSIN HYDROCHLORIDE 3 MG: 1 CAPSULE ORAL at 20:05

## 2022-05-04 RX ADMIN — NICOTINE POLACRILEX 2 MG: 2 LOZENGE ORAL at 16:43

## 2022-05-04 RX ADMIN — MIRABEGRON 50 MG: 25 TABLET, FILM COATED, EXTENDED RELEASE ORAL at 08:14

## 2022-05-04 RX ADMIN — NICOTINE POLACRILEX 2 MG: 2 LOZENGE ORAL at 06:30

## 2022-05-04 RX ADMIN — NICOTINE POLACRILEX 2 MG: 2 LOZENGE ORAL at 20:09

## 2022-05-04 RX ADMIN — HYDROCHLOROTHIAZIDE 25 MG: 25 TABLET ORAL at 08:14

## 2022-05-04 RX ADMIN — LISINOPRIL 10 MG: 10 TABLET ORAL at 08:14

## 2022-05-04 RX ADMIN — PANTOPRAZOLE SODIUM 40 MG: 40 TABLET, DELAYED RELEASE ORAL at 07:03

## 2022-05-04 ASSESSMENT — ACTIVITIES OF DAILY LIVING (ADL)
DRESS: INDEPENDENT
HYGIENE/GROOMING: INDEPENDENT
LAUNDRY: WITH SUPERVISION
ORAL_HYGIENE: INDEPENDENT
HYGIENE/GROOMING: INDEPENDENT
DRESS: SCRUBS (BEHAVIORAL HEALTH)
LAUNDRY: UNABLE TO COMPLETE
ORAL_HYGIENE: INDEPENDENT

## 2022-05-04 NOTE — PLAN OF CARE
Problem: Behavioral Health Plan of Care  Goal: Patient-Specific Goal (Individualization)  Outcome: Ongoing, Progressing  Goal: Optimal Comfort and Wellbeing  Outcome: Ongoing, Progressing  Intervention: Provide Person-Centered Care  Recent Flowsheet Documentation  Taken 5/4/2022 1037 by Hesham Nieves RN  Trust Relationship/Rapport:   care explained   emotional support provided  Goal: Team Discussion  Outcome: Ongoing, Progressing     Problem: Noninvasive Ventilation Acute  Goal: Effective Unassisted Ventilation and Oxygenation  Outcome: Ongoing, Progressing   Goal Outcome Evaluation:    Plan of Care Reviewed With: patient      Pt denies all psych symptoms but maintained flat affect and contracted for safety. Present in the milieu, minimally engaged with peers, watched TV and cooperated with medications. Pt came out for breakfast and lunch and ate 100% of both meals. Did not attend group activities despite encouragement from staff.

## 2022-05-04 NOTE — PLAN OF CARE
"      INITIAL 3 NIGHTS HOME BIPAP/CPAP NOTE    UNIT TYPE:  Home unit  MASK TYPE  Large face mask    INITIAL SET UP:   UNIT CLEAN AND FUNCTIONAL - YES   TUBING CLEAN AND FUNCTIONAL - YES   PATIENT INTERFACE INTEGRITY HAS BEEN CHECK: YES    MASK IS CLEAN AND PLIABLE YES    MASK IS UNDAMAGED - YES    MASK HAS ADEQUATE CUSHION YES     TIME OF MASK PLACEMENT - 22:10 PM    TWO HOUR SKIN CHECK DONE AT   No redness or skin break    INTERVENTION INITIATED     No intervention needed at this time  SETTINGS:   CPAP -  Home setting    BIPAP -    02 BLED IN -      PATIENT'S TOLERANCE OF DEVICE -  YES      Pt on his home cpap/ra 3rd night, suraj well. bs clear/diminish. Blood pressure 121/66, pulse 84, temperature 98.5  F (36.9  C), resp. rate 18, height 1.83 m (6' 0.05\"), weight (!) 167 kg (368 lb 2.7 oz), SpO2 97 %.        Plan: keep sat >/=90% days and home cpap at night    "

## 2022-05-04 NOTE — PLAN OF CARE
Problem: Behavioral Health Plan of Care  Goal: Adheres to Safety Considerations for Self and Others  5/4/2022 0635 by Glo Rizvi RN  Outcome: Ongoing, Progressing  5/4/2022 0131 by Glo Rizvi RN  Outcome: Ongoing, Progressing     Problem: Sleep Disturbance  Goal: Adequate Sleep/Rest  5/4/2022 0635 by Glo Rizvi RN  Outcome: Ongoing, Progressing  5/4/2022 0131 by Glo Rizvi RN  Outcome: Ongoing, Progressing     Problem: Suicidal Behavior  Goal: Suicidal Behavior is Absent or Managed  5/4/2022 0635 by Glo Rizvi RN  Outcome: Ongoing, Progressing  5/4/2022 0131 by Glo Rizvi RN  Outcome: Ongoing, Progressing   Goal Outcome Evaluation:

## 2022-05-04 NOTE — PLAN OF CARE
Problem: Behavioral Health Plan of Care  Goal: Adheres to Safety Considerations for Self and Others  Outcome: Ongoing, Progressing  Intervention: Develop and Maintain Individualized Safety Plan  Recent Flowsheet Documentation  Taken 5/3/2022 1654 by Macey Finnegan RN  Safety Measures:    safety rounds completed    suicide assessment completed     Problem: Behavioral Health Plan of Care  Goal: Optimal Comfort and Wellbeing  Outcome: Ongoing, Progressing  Intervention: Monitor Pain and Promote Comfort  Recent Flowsheet Documentation  Taken 5/3/2022 1654 by Macey Finnegan RN  Pain Management Interventions: medication (see MAR)  Intervention: Provide Person-Centered Care  Recent Flowsheet Documentation  Taken 5/3/2022 1654 by Macey Finnegan RN  Trust Relationship/Rapport:    care explained    emotional support provided     Problem: Behavioral Health Plan of Care  Goal: Optimized Coping Skills in Response to Life Stressors  Outcome: Ongoing, Progressing  Intervention: Promote Effective Coping Strategies  Recent Flowsheet Documentation  Taken 5/3/2022 1654 by Macey Finnegan RN  Supportive Measures: active listening utilized     Problem: Behavioral Health Plan of Care  Goal: Develops/Participates in Therapeutic [place] to Support Successful Transition  Outcome: Ongoing, Progressing  Intervention: Foster Therapeutic [place]  Recent Flowsheet Documentation  Taken 5/3/2022 1654 by Macey Finnegan RN  Trust Relationship/Rapport:    care explained    emotional support provided     Problem: Pain Acute  Goal: Acceptable Pain Control and Functional Ability  Outcome: Ongoing, Progressing  Intervention: Develop Pain Management Plan  Recent Flowsheet Documentation  Taken 5/3/2022 1654 by Macey Finnegan RN  Pain Management Interventions: medication (see MAR)  Intervention: Prevent or Manage Pain  Recent Flowsheet Documentation  Taken 5/3/2022 1654 by Macey Finnegan RN  Sleep/Rest Enhancement:  medication  Intervention: Optimize Psychosocial Wellbeing  Recent Flowsheet Documentation  Taken 5/3/2022 1654 by Macey Finnegan RN  Supportive Measures: active listening utilized   Endorses chronic R hip pain 9/10. Scheduled Naproxen helpful. He denies anxiety, depression, SI, HI, AH. Patient is pleasant and cooperative on approach. Keeps to himself. Out in the milieu watching tv. Patient had shower before dinner. Compliant with medication. Received Nicotine lozenge x 3 this shift. No new concerns noted. Covid test negative.     2200 RT came to put his CPAP.

## 2022-05-04 NOTE — PROGRESS NOTES
"PSYCHIATRY  PROGRESS NOTE     DATE OF SERVICE   5/4/2022         CHIEF COMPLAINT   \"Everything looks promising\"       SUBJECTIVE   Nursing reports:  Patient visible in the milieu, minimally engaged with peers. He was medication compliant. He did not attend group. He denies all psych symptoms.      reports: Patient is aware about the discharge plan of going to [facility] placement. [facility]. [facility] staff has sent  a referral to [facility] stabilization [facility] is a crisis residence for individuals on a DD waiver.        OBJECTIVE   Chart reviewed and patient assessed. Patient was seen and evaluated in the day area by himself. Upon patient interview, patient reports he is doing okay, saying everything looks promising. He states he hopes to discharge to [facility] home tomorrow. When writer told patient we are still waiting to hear back from [facility] regarding acceptance, she stated he can go to his dad or hotel if he is not able to leave the hospital tomorrow. Writer reminded patient that he is not able to discharge to his dad or hotel at this time per his legal guardian/mother. He verbalized understanding. He denies both suicidal and homicidal ideation. He denies both auditory and visual hallucinations. No paranoid ideation observed during this assessment.       MEDICATIONS   Medications:  Scheduled Meds:    calcium polycarbophil  1,250 mg Oral BID     DULoxetine  120 mg Oral Daily     hydrochlorothiazide  25 mg Oral Daily     liraglutide - Weight Management  3 mg Subcutaneous Daily     lisinopril  10 mg Oral QAM     mirabegron  50 mg Oral Daily     naproxen sodium  440 mg Oral BID w/meals     OLANZapine zydis  10 mg Oral At Bedtime     oxybutynin ER  30 mg Oral Daily     pantoprazole  40 mg Oral QAM AC     prazosin  3 mg Oral At Bedtime     vitamin D3  50 mcg Oral Daily     Continuous Infusions:  PRN Meds:.acetaminophen, albuterol, alum & mag " "hydroxide-simethicone, hydrOXYzine, nicotine, OLANZapine **OR** OLANZapine, senna-docusate, traZODone    Medication adherence issues: MS Med Adherence Y/N: No  Medication side effects: MEDICATION SIDE EFFECTS: no side effects reported  Benefit: Yes / No: Yes       ROS   A 10-point review of systems was completed and is otherwise negative with the exception of HPI.       MENTAL STATUS EXAM   Vitals: /68   Pulse 84   Temp 97.6  F (36.4  C) (Oral)   Resp 18   Ht 1.83 m (6' 0.05\")   Wt (!) 167 kg (368 lb 2.7 oz)   SpO2 98%   BMI 49.87 kg/m      Appearance:  Casually groomed  Mood:  {Mood: Anxious  and Euthymic  Affect: full range  was congruent to speech  Suicidal Ideation: PRESENT / ABSENT: absent   Homicidal Ideation: PRESENT / ABSENT: absent   Thought process: concrete   Thought content: denies suicidal and homicidal ideations. Denies paranoid ideation and delusion   Fund of Knowledge: Below average  Attention/Concentration: Fair  Language ability:  Intact  Memory:  Immediate recall intact, Short-term memory intact and Long-term memory intact  Insight:  limited.  Judgement: limited  Orientation: Yes, x4  Psychomotor Behavior: denies tardive dyskinesia, dystonia or tics  Muscle Strength and Tone: MuscleStrength: Normal  Gait and Station: Normal       LABS   personally reviewed.     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)    Active Problem List:  Patient Active Problem List   Diagnosis     Speech/language delay     Tobacco use disorder     Nocturnal enuresis     Moderate major depression (H)     LOREN (obstructive sleep apnea)     Essential hypertension     Morbid obesity (H)     Leukocytosis     Suicidal ideation     Chronic low back pain     Bilateral low back pain with left-sided sciatica     History of colonic polyps     Mild intellectual disability     S/P total hip " arthroplasty     Vitamin D deficiency     LPRD (laryngopharyngeal reflux disease)     Patel's esophagus determined by biopsy     Mild intermittent asthma     Somatic dysfunction of lumbar region     Somatic dysfunction of sacral region     Sacral pain     Thoracic segment dysfunction     Hip pain, left     Duodenitis     Overactive bladder     Venous stasis dermatitis of both lower extremities     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 Ongoing education given regarding diagnostic and treatment options with risks, benefits and alternatives and adequate verbalization of understanding.  2. Medications: Cymbalta 120 mg                           Zyprexa 10 mg starting 4/27/22  3) Hospitalist consult: Hospitalist to see patient as needed  4) Legal: Voluntary  5)  to follow regarding collecting and reviewing collateral information, referrals and disposition planning     Risk Assessment: [facility] RISK ASSESSMENT: Patient able to contract for safety    Coordination of Care:   Treatment Plan reviewed and physician signed, Care discussed with Care/Treatment Team Members, Chart reviewed and Patient seen      Re-Certification I certify that the inpatient psychiatric facility services furnished since the previous certification were, and continue to be, medically necessary for, either, treatment which could reasonably be expected to improve the patient s condition or diagnostic study and that the hospital records indicate that the services furnished were, either, intensive treatment services, admission and related services necessary for diagnostic study, or equivalent services.     I certify that the patient continues to need, on a daily basis, active treatment furnished directly by or requiring the supervision of inpatient psychiatric facility personnel.   I estimate 5-7  days of hospitalization is necessary for proper treatment of the patient. My plans for post-hospital care for this patient are  adult foster care     LAMBERT Zhu CNP    -     5/4/2022     -     11:18 AM    Total time  25 minutes with > 50%spent on coordination of cares and psycho-education.    This note was created with help of Dragon dictation system. Grammatical / typing errors are not intentional.    LAMBERT Zhu CNP

## 2022-05-04 NOTE — PLAN OF CARE
Goal Outcome Evaluation:    Plan of Care Reviewed With: patient      Pt. calm, cooperative and pleasant on approach. Out in the milieu minimally engagement with peers and staff rested in his room after supper. Rating back pain 4/10 scheduled Naproxen effective, denies anxiety, depression and all other psych symptoms, contracted for safety.  PRN nicotine tammy effective

## 2022-05-04 NOTE — PROGRESS NOTES
Patient appeared to rest well during noc shift, continues in medical bed due to use of CPAP, slept approx 7 hours at this time, no reports of pain or discomfort, no precautionary behaviors noted or reported, no prn's adm or requested, 15 minute safety checks performed per protocol, will continue to monitor

## 2022-05-04 NOTE — PROGRESS NOTES
Reviewed XR results. Pt states pain is well controlled. Agrees to AROM exercises with left shoulder as able. No other questions or concerns.     [facility] will sign off.     Denise VERDIN, CNP  Hospital Medicine Service  [facility]

## 2022-05-04 NOTE — PROGRESS NOTES
05/04/22 0915   Engagement   Intervention Group   Topic Detail OT: Education on physical wellness and cognitive wellness with interactive social activities (Theraband & Name 5) to increase concentration, focus, attention to task/detail, memory recall, coping with stress, health distraction engagement, social wellness, physical wellness, and cognitive wellness   Attendance Did not attend     Did not attend after overhead announcement.

## 2022-05-04 NOTE — PLAN OF CARE
"Assessment/Intervention/Current Symptoms and Care Coordination  Writer met with patient to discuss discharge. Patient was pleasant and conversational. Patient talked about hunting, past snowboarding and identified his favorite season as winter. Patient provided verbal acknowledgement of discharge plan to [facility] placement (crisis residence). Despite his agreement patient stated that he feels like he is stable enough to be at home while his father works. He expressed an interest in staying with his father. Patient has not talked to father about this stating \"He is under the impression that I cannot be alone.\" Writer  Re-iterated discharge plan and expressed that saying at his dad's may be a longer term plan and would need to be discussed with mother father and .    Writer left message for Mehul at [facility] regarding placement in [place]. Writer spoke to [facility]. Mehul stated that a referral has been set to [place] [facility] services [facility] is a crisis residence for individuals on a DD waiver. Mehul stated that they usually are quick in their response however knows that there has been concerns with staffing He stated that he would like to give the referral four days until Friday Morning as they have an immediate opening and this location would be good for the patient. He expressed that there is an opening next week at a different location ([place]) and will send a referral on Friday. He noted that  \"once it happens it will happen very quickly.\" Mehul stated that he has been in contact with .  Writer left message with  Crys.    Discharge Plan or Goal  TBD- cannot return to group home. [facility] Crisis residence        Barriers to Discharge   Symptom stabilization, medication management care coordination      Referral Status Pending. Guardian has declined I[facility] referrals. [facility] has received a referral for a crisis residence contact " Mehul .        On 05/02 Writer spoke to Mehul ANNI stephanie crisis referrals . Mheul stated that he has sent a referral to a crisis residence in [place]. Mehul stated that he should know in one to two business days if patient has been accepted. According to Mehul [place] has bed availability.    On 05/02 Guardian stated that patient has a 30 day supply of medication at home and would only need to fill the olanzapine 10 mg as this was changed during this hospitalization.     Writer left a message with [address] regarding a return to crisis on 4/28, 4/29 and 05/02. [facility] returned call on 05/02 stating that they do not have bed availability this weeks they have no openings. Writer faxed requested information for potential future openings.      Referrals:    [facility] IRTS all locations faxed 4/28 fax  phone central access [phone]    [facility] faxed 4/28 fax  phone  left voicemail 05/02  [facility] fax  phone  left voicemail 05/02    [place] [facility] faxed 4/28  Fax  phone     Patient had been at [facility]  crisis respite in the past, CM does not believe he can return. [facility] [address] [place] [address] [place] [address] Tele: [phone]  Fax: [phone] Left crisis residence on February 28th.       Contacts:  Therapist:  Kulwant Hernandezx2/wk and Kathrynx1/wk. Location: [place]. Date of last visit: unknown. Frequency: see before. Perceived helpfulness: helpful.    Psychiatrist: Traci Crespo. Location: [place]. Date of last visit: 3/16/2022. Frequency: 1 time a month. Perceived helpfulness: helpful.    :  [facility]. Location: [place] [phone]. Date of last visit: unknown. Frequency: unknown. Perceived helpfulness: unknown.    Guardian Ryann [phone] and sister Traci [place]  [phone].     Primary Care Provider: Kory Hudson MD. Location: unknown. Date of last visit: unknown. Frequency: unknown. Perceived helpfulness: helpful    Legal Status  voluntary      Cintia Moreno, [facility], [facility], 05/02/2022, 8:51 AM

## 2022-05-04 NOTE — PLAN OF CARE
Problem: Behavioral Health Plan of Care  Goal: Adheres to Safety Considerations for Self and Others  Outcome: Ongoing, Progressing     Problem: Sleep Disturbance  Goal: Adequate Sleep/Rest  Outcome: Ongoing, Progressing     Problem: Suicidal Behavior  Goal: Suicidal Behavior is Absent or Managed  Outcome: Ongoing, Progressing   Goal Outcome Evaluation:

## 2022-05-05 PROCEDURE — 250N000013 HC RX MED GY IP 250 OP 250 PS 637: Performed by: NURSE PRACTITIONER

## 2022-05-05 PROCEDURE — 250N000013 HC RX MED GY IP 250 OP 250 PS 637: Performed by: PSYCHIATRY & NEUROLOGY

## 2022-05-05 PROCEDURE — 128N000001 HC R&B CD/MH ADULT

## 2022-05-05 PROCEDURE — 99232 SBSQ HOSP IP/OBS MODERATE 35: CPT | Performed by: NURSE PRACTITIONER

## 2022-05-05 PROCEDURE — 250N000013 HC RX MED GY IP 250 OP 250 PS 637

## 2022-05-05 PROCEDURE — 999N000157 HC STATISTIC RCP TIME EA 10 MIN

## 2022-05-05 RX ADMIN — PANTOPRAZOLE SODIUM 40 MG: 40 TABLET, DELAYED RELEASE ORAL at 08:10

## 2022-05-05 RX ADMIN — NICOTINE POLACRILEX 2 MG: 2 LOZENGE ORAL at 10:46

## 2022-05-05 RX ADMIN — OXYBUTYNIN CHLORIDE 30 MG: 10 TABLET, EXTENDED RELEASE ORAL at 08:09

## 2022-05-05 RX ADMIN — PRAZOSIN HYDROCHLORIDE 3 MG: 1 CAPSULE ORAL at 20:41

## 2022-05-05 RX ADMIN — NICOTINE POLACRILEX 2 MG: 2 LOZENGE ORAL at 17:12

## 2022-05-05 RX ADMIN — NAPROXEN SODIUM 440 MG: 220 TABLET, FILM COATED ORAL at 18:00

## 2022-05-05 RX ADMIN — MIRABEGRON 50 MG: 25 TABLET, FILM COATED, EXTENDED RELEASE ORAL at 08:07

## 2022-05-05 RX ADMIN — Medication 50 MCG: at 08:07

## 2022-05-05 RX ADMIN — NICOTINE POLACRILEX 2 MG: 2 LOZENGE ORAL at 08:56

## 2022-05-05 RX ADMIN — NICOTINE POLACRILEX 2 MG: 2 LOZENGE ORAL at 14:53

## 2022-05-05 RX ADMIN — DULOXETINE 120 MG: 60 CAPSULE, DELAYED RELEASE ORAL at 08:06

## 2022-05-05 RX ADMIN — LISINOPRIL 10 MG: 10 TABLET ORAL at 08:56

## 2022-05-05 RX ADMIN — NAPROXEN SODIUM 440 MG: 220 TABLET, FILM COATED ORAL at 08:08

## 2022-05-05 RX ADMIN — CALCIUM POLYCARBOPHIL 1250 MG: 625 TABLET, FILM COATED ORAL at 21:00

## 2022-05-05 RX ADMIN — CALCIUM POLYCARBOPHIL 1250 MG: 625 TABLET, FILM COATED ORAL at 08:08

## 2022-05-05 RX ADMIN — OLANZAPINE 10 MG: 10 TABLET, ORALLY DISINTEGRATING ORAL at 20:42

## 2022-05-05 RX ADMIN — HYDROCHLOROTHIAZIDE 25 MG: 25 TABLET ORAL at 08:08

## 2022-05-05 ASSESSMENT — ACTIVITIES OF DAILY LIVING (ADL)
ORAL_HYGIENE: INDEPENDENT
DRESS: INDEPENDENT
DRESS: INDEPENDENT
LAUNDRY: WITH SUPERVISION
HYGIENE/GROOMING: INDEPENDENT
HYGIENE/GROOMING: INDEPENDENT
ORAL_HYGIENE: INDEPENDENT

## 2022-05-05 NOTE — PLAN OF CARE
Assessment/Intervention/Current Symptoms and Care Coordination  Writer met with patient to discuss discharge. Patient was calm and cooperative. Patient expressed frustration at remaining in the hospital. Patient expressed that he was disappointed that he could not go to his dad's. Patient is aware of discharge plans to [facility].    Discharge Plan or Goal  TBD- cannot return to group home. [facility]      Barriers to Discharge   Symptom stabilization, medication management care coordination      Referral Status Pending. Guardian has declined IRTS referrals. [facility] has received a referral for a [facility] residence contact Mehul .     Writer spoke to Mehul [facility] referrals .  Writer provided updated information as requested by [facility]. Mehul stated that he should have an update by the end of the day .  A [facility]) has bed availability.    On 05/02 Guardian stated that patient has a 30 day supply of medication at home and would only need to fill the olanzapine 10 mg as this was changed during this hospitalization.     Contacts:  Therapist:  Kulwant Hernandezx2/wk and Kathrynx1/wk. Location: [place]. Date of last visit: unknown. Frequency: see before. Perceived helpfulness: helpful.    Psychiatrist: Traci Crespo. Location: [place]. Date of last visit: 3/16/2022. Frequency: 1 time a month. Perceived helpfulness: helpful.    :  [facility]. Location: [place] [phone]. Date of last visit: unknown. Frequency: unknown. Perceived helpfulness: unknown.    Guardian Ryann [phone] and sister Traci [place] [phone].     Primary Care Provider: Kory Hudson MD. Location: unknown. Date of last visit: unknown. Frequency: unknown. Perceived helpfulness: helpful    Legal Status  voluntary      Cintia Moreno, [facility], [facility], 05/05/2022

## 2022-05-05 NOTE — PLAN OF CARE
Problem: Behavioral Health Plan of Care  Goal: Plan of Care Review  Recent Flowsheet Documentation  Taken 5/5/2022 1033 by Cielo Aguirre RN  Plan of Care Reviewed With: patient  Patient Agreement with Plan of Care: agrees  Goal: Adheres to Safety Considerations for Self and Others  Outcome: Ongoing, Progressing  Intervention: Develop and Maintain Individualized Safety Plan  Recent Flowsheet Documentation  Taken 5/5/2022 1033 by Cielo Aguirre RN  Safety Measures: safety rounds completed  Goal: Absence of New-Onset Illness or Injury  Outcome: Ongoing, Progressing  Intervention: Identify and Manage Fall Risk  Recent Flowsheet Documentation  Taken 5/5/2022 1033 by Cielo Aguirre RN  Safety Measures: safety rounds completed   Goal Outcome Evaluation:    Plan of Care Reviewed With: patient     Patient is up on the unit all shift. He is calm and cooperative. He denies SI,HI and contracts for safety. Denies depression and anxiety. He has a couple large cracks to his left foot and a hospitalist is suppose to come and assess. He did shower this afternoon.

## 2022-05-05 NOTE — PLAN OF CARE
Goal review completed:     Problem: Relapse Prevention  Goal: Engage in OT Group  Description: Engage in 1 OT group activity this review period to support recovery and encourage engagement in meaningful, goal-directed activity.   Outcome: Ongoing, Not Progressing     Patient has attended 0 groups since admission. Improvements observed from admission in that pt is spending more time outside of him room, present in the milieu. He does respond to OT staff appropriately during invitations to group. Today, pt on the periphery of group did chime in with one response to a trivia question. Care plan goals have been reviewed. Continue care plan and interventions for further progress related to group engagement and participation in meaningful, goal-directed activity. Continue to establish rapport and encourage group participation with focus on goal area/s for further progress. Continue to correspond with interdisciplinary team regarding ongoing treatment plan, potential changes in patient's status, and discharge planning.

## 2022-05-05 NOTE — PLAN OF CARE
Problem: Behavioral Health Plan of Care  Goal: Plan of Care Review  Outcome: Ongoing, Progressing  Goal: Adheres to Safety Considerations for Self and Others  Intervention: Develop and Maintain Individualized Safety Plan  Recent Flowsheet Documentation  Taken 5/5/2022 0645 by Marlen Frank RN  Safety Measures: safety rounds completed  Goal: Absence of New-Onset Illness or Injury  Intervention: Identify and Manage Fall Risk  Recent Flowsheet Documentation  Taken 5/5/2022 0645 by Marlen Frank RN  Safety Measures: safety rounds completed     Problem: Sleep Disturbance  Goal: Adequate Sleep/Rest  Outcome: Ongoing, Progressing   Goal Outcome Evaluation:    Pt appeared sleeping  for about 6.5 hours this shift  using a CPAP  Respirations are even and unlabored.  Safety checks completed throughout the night per protocol.   Pt use a medical bed due to mobility issue and CPAP use  No discomfort or pain verbalized this shift  Pt is on suicide precaution.   No harm to self and others noted or reported this shift.   No SI/HI or hallucination noted or reported this shift  Will continue to monitor and assist as needed.

## 2022-05-05 NOTE — PROGRESS NOTES
"PSYCHIATRY  PROGRESS NOTE     DATE OF SERVICE   5/5/2022         CHIEF COMPLAINT   \"I am frustrated\"       SUBJECTIVE   Nursing reports:  Patient spent time in the lounge, minimally engaged with peers. He was medication compliant. He did not attend group. He denies all psych symptoms.      reports: Patient is aware about the discharge plan of going to [facility] placement. [facility]. [facility] staff has sent  a referral to [facility] stabilization [facility] is a crisis residence for individuals on a DD waiver.        OBJECTIVE   Chart reviewed and patient assessed. Patient was seen and evaluated in the day area by himself. Upon patient interview, patient reports he is frustrated for not being able to leave the hospital and go live with his dad, saying he is trying to be patient and not letting his frustration get the best of him. Writer validated patient's feelings and encouraged him to continue to be patient while we are waiting to hear back from [facility] staff tomorrow or Monday regarding when they are able to take him. Patient verbalized understanding as he hoped he leaves tomorrow or Monday. He presents as angry, though calm and cooperative. He denies both suicidal and homicidal ideation. He denies both auditory and visual hallucinations. No paranoid ideation observed during this assessment. Deep heel and toe cracks in the bilateral foot. Hospitalist consult placed for assessment and treatment.      MEDICATIONS   Medications:  Scheduled Meds:    calcium polycarbophil  1,250 mg Oral BID     DULoxetine  120 mg Oral Daily     hydrochlorothiazide  25 mg Oral Daily     liraglutide - Weight Management  3 mg Subcutaneous Daily     lisinopril  10 mg Oral QAM     mirabegron  50 mg Oral Daily     naproxen sodium  440 mg Oral BID w/meals     OLANZapine zydis  10 mg Oral At Bedtime     oxybutynin ER  30 mg Oral Daily     pantoprazole  40 mg Oral QAM AC     prazosin  3 " "mg Oral At Bedtime     vitamin D3  50 mcg Oral Daily     Continuous Infusions:  PRN Meds:.acetaminophen, albuterol, alum & mag hydroxide-simethicone, hydrOXYzine, nicotine, OLANZapine **OR** OLANZapine, senna-docusate, traZODone    Medication adherence issues: MS Med Adherence Y/N: No  Medication side effects: MEDICATION SIDE EFFECTS: no side effects reported  Benefit: Yes / No: Yes       ROS   A 10-point review of systems was completed and is otherwise negative with the exception of HPI.       MENTAL STATUS EXAM   Vitals: /64 (BP Location: Left arm, Patient Position: Sitting, Cuff Size: Adult Large)   Pulse 88   Temp 97.8  F (36.6  C) (Oral)   Resp 18   Ht 1.83 m (6' 0.05\")   Wt (!) 167 kg (368 lb 2.7 oz)   SpO2 97%   BMI 49.87 kg/m      Appearance:  Casually groomed  Mood:  {Mood: Anxious  and Euthymic  Affect: full range  was congruent to speech  Suicidal Ideation: PRESENT / ABSENT: absent   Homicidal Ideation: PRESENT / ABSENT: absent   Thought process: concrete   Thought content: denies suicidal and homicidal ideations. Denies paranoid ideation and delusion   Fund of Knowledge: Below average  Attention/Concentration: Fair  Language ability:  Intact  Memory:  Immediate recall intact, Short-term memory intact and Long-term memory intact  Insight:  limited.  Judgement: limited  Orientation: Yes, x4  Psychomotor Behavior: denies tardive dyskinesia, dystonia or tics  Muscle Strength and Tone: MuscleStrength: Normal  Gait and Station: Normal       LABS   personally reviewed.     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)    Active Problem List:  Patient Active Problem List   Diagnosis     Speech/language delay     Tobacco use disorder     Nocturnal enuresis     Moderate major depression (H)     LOREN (obstructive sleep apnea)     Essential hypertension     Morbid obesity (H)     " Leukocytosis     Suicidal ideation     Chronic low back pain     Bilateral low back pain with left-sided sciatica     History of colonic polyps     Mild intellectual disability     S/P total hip arthroplasty     Vitamin D deficiency     LPRD (laryngopharyngeal reflux disease)     Patel's esophagus determined by biopsy     Mild intermittent asthma     Somatic dysfunction of lumbar region     Somatic dysfunction of sacral region     Sacral pain     Thoracic segment dysfunction     Hip pain, left     Duodenitis     Overactive bladder     Venous stasis dermatitis of both lower extremities     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 Ongoing education given regarding diagnostic and treatment options with risks, benefits and alternatives and adequate verbalization of understanding.  2. Medications: Cymbalta 120 mg                           Zyprexa 10 mg starting 4/27/22  3) Hospitalist consult: Hospitalist to see patient as needed  4) Legal: Voluntary  5)  to follow regarding collecting and reviewing collateral information, referrals and disposition planning     Risk Assessment:  [facility] RISK ASSESSMENT: Patient able to contract for safety    Coordination of Care:   Treatment Plan reviewed and physician signed, Care discussed with Care/Treatment Team Members, Chart reviewed and Patient seen      Re-Certification I certify that the inpatient psychiatric facility services furnished since the previous certification were, and continue to be, medically necessary for, either, treatment which could reasonably be expected to improve the patient s condition or diagnostic study and that the hospital records indicate that the services furnished were, either, intensive treatment services, admission and related services necessary for diagnostic study, or equivalent  services.     I certify that the patient continues to need, on a daily basis, active treatment furnished directly by or requiring the supervision of inpatient psychiatric facility personnel.   I estimate 5-7 days of hospitalization is necessary for proper treatment of the patient. My plans for post-hospital care for this patient are  adult foster care     LAMBERT Zhu CNP    -     5/5/2022     -     10:58 AM    Total time  25 minutes with > 50%spent on coordination of cares and psycho-education.    This note was created with help of Dragon dictation system. Grammatical / typing errors are not intentional.    LAMBERT Zhu CNP

## 2022-05-06 DIAGNOSIS — E66.01 MORBID OBESITY (H): ICD-10-CM

## 2022-05-06 PROCEDURE — 250N000013 HC RX MED GY IP 250 OP 250 PS 637: Performed by: PSYCHIATRY & NEUROLOGY

## 2022-05-06 PROCEDURE — 128N000001 HC R&B CD/MH ADULT

## 2022-05-06 PROCEDURE — 250N000013 HC RX MED GY IP 250 OP 250 PS 637: Performed by: NURSE PRACTITIONER

## 2022-05-06 PROCEDURE — 250N000013 HC RX MED GY IP 250 OP 250 PS 637

## 2022-05-06 PROCEDURE — 99232 SBSQ HOSP IP/OBS MODERATE 35: CPT | Performed by: NURSE PRACTITIONER

## 2022-05-06 RX ORDER — MINERAL OIL/HYDROPHIL PETROLAT
OINTMENT (GRAM) TOPICAL
Status: DISCONTINUED | OUTPATIENT
Start: 2022-05-06 | End: 2022-05-11 | Stop reason: HOSPADM

## 2022-05-06 RX ADMIN — OXYBUTYNIN CHLORIDE 30 MG: 10 TABLET, EXTENDED RELEASE ORAL at 08:53

## 2022-05-06 RX ADMIN — NICOTINE POLACRILEX 2 MG: 2 LOZENGE ORAL at 14:00

## 2022-05-06 RX ADMIN — HYDROCHLOROTHIAZIDE 25 MG: 25 TABLET ORAL at 08:53

## 2022-05-06 RX ADMIN — MIRABEGRON 50 MG: 25 TABLET, FILM COATED, EXTENDED RELEASE ORAL at 08:53

## 2022-05-06 RX ADMIN — OLANZAPINE 10 MG: 10 TABLET, ORALLY DISINTEGRATING ORAL at 20:20

## 2022-05-06 RX ADMIN — NICOTINE POLACRILEX 2 MG: 2 LOZENGE ORAL at 17:15

## 2022-05-06 RX ADMIN — DULOXETINE 120 MG: 60 CAPSULE, DELAYED RELEASE ORAL at 08:54

## 2022-05-06 RX ADMIN — NICOTINE POLACRILEX 2 MG: 2 LOZENGE ORAL at 08:54

## 2022-05-06 RX ADMIN — LISINOPRIL 10 MG: 10 TABLET ORAL at 08:53

## 2022-05-06 RX ADMIN — NICOTINE POLACRILEX 2 MG: 2 LOZENGE ORAL at 20:47

## 2022-05-06 RX ADMIN — CALCIUM POLYCARBOPHIL 1250 MG: 625 TABLET, FILM COATED ORAL at 08:54

## 2022-05-06 RX ADMIN — Medication 50 MCG: at 08:53

## 2022-05-06 RX ADMIN — NICOTINE POLACRILEX 2 MG: 2 LOZENGE ORAL at 11:45

## 2022-05-06 RX ADMIN — NAPROXEN SODIUM 440 MG: 220 TABLET, FILM COATED ORAL at 17:01

## 2022-05-06 RX ADMIN — CALCIUM POLYCARBOPHIL 1250 MG: 625 TABLET, FILM COATED ORAL at 20:20

## 2022-05-06 RX ADMIN — NICOTINE POLACRILEX 2 MG: 2 LOZENGE ORAL at 18:35

## 2022-05-06 RX ADMIN — PANTOPRAZOLE SODIUM 40 MG: 40 TABLET, DELAYED RELEASE ORAL at 08:53

## 2022-05-06 RX ADMIN — NAPROXEN SODIUM 440 MG: 220 TABLET, FILM COATED ORAL at 08:53

## 2022-05-06 RX ADMIN — PRAZOSIN HYDROCHLORIDE 3 MG: 1 CAPSULE ORAL at 20:20

## 2022-05-06 ASSESSMENT — ACTIVITIES OF DAILY LIVING (ADL)
ORAL_HYGIENE: INDEPENDENT
LAUNDRY: WITH SUPERVISION
HYGIENE/GROOMING: INDEPENDENT
LAUNDRY: WITH SUPERVISION
DRESS: STREET CLOTHES;INDEPENDENT
DRESS: INDEPENDENT
HYGIENE/GROOMING: INDEPENDENT
ORAL_HYGIENE: INDEPENDENT

## 2022-05-06 NOTE — PROGRESS NOTES
RT NOTE:    Pts home CPAP checked and at bedside, ready for pt at HS. Home settings, RA, to a large over the nose mask. RT to follow. Floor staff to place pts CPAP in the med room in the am.

## 2022-05-06 NOTE — TELEPHONE ENCOUNTER
Routing refill request to provider for review/approval because:  Requires provider review     Eliana Concepcion RN

## 2022-05-06 NOTE — PLAN OF CARE
Problem: Sleep Disturbance  Goal: Adequate Sleep/Rest  Outcome: Ongoing, Progressing  Intervention: Promote Sleep/Rest  Recent Flowsheet Documentation  Taken 5/6/2022 0249 by Sally Martínez, RN  Sleep/Rest Enhancement: comfort measures     Problem: Suicidal Behavior  Goal: Suicidal Behavior is Absent or Managed  Outcome: Ongoing, Progressing   Goal Outcome Evaluation:           Patient slept for most part of the night, with CPAP machine on. Up for snack x 1. Safety checks completed per unit policy. No suicide behaviors noted. He had more than 6 hrs of sleep

## 2022-05-06 NOTE — PROGRESS NOTES
Patient reports back pain 10/10. Received scheduled Naproxen with good effect. Calm and pleasant on approach. Out and visible in milieu most of the evening shift watching TV. Minimal interaction with peers. Pt denies  psych symptoms, contracted for safety.  PRN nicotine tammy. Medication compliant

## 2022-05-06 NOTE — PLAN OF CARE
Problem: Behavioral Health Plan of Care  Goal: Adheres to Safety Considerations for Self and Others  Outcome: Ongoing, Progressing  Intervention: Develop and Maintain Individualized Safety Plan  Recent Flowsheet Documentation  Taken 5/5/2022 2123 by Tera Johnson, RN  Safety Measures: safety rounds completed     Problem: Suicidal Behavior  Goal: Suicidal Behavior is Absent or Managed  Outcome: Ongoing, Progressing     Problem: Violence Risk or Actual  Goal: Anger and Impulse Control  Outcome: Ongoing, Progressing   Goal Outcome Evaluation:    Plan of Care Reviewed With: patient

## 2022-05-06 NOTE — PROGRESS NOTES
"PSYCHIATRY  PROGRESS NOTE     DATE OF SERVICE   5/6/2022         CHIEF COMPLAINT   \"I am hopeful\"       SUBJECTIVE   Nursing reports:  Patient spent time between his room and the lounge, minimally engaged with peers. He was medication compliant. He did not attend group. He denies all psych symptoms.      reports: Patient is aware about the discharge plan of going to [facility] placement. Per [facility] staff, patient has been accepted to the [facility] [place] [facility]. Patient will be having intake interview either next week Monday or Tuesday. Fund has been approved for the program.         OBJECTIVE   Chart reviewed and patient assessed. Patient was seen and evaluated in the day area by himself. Upon patient interview, patient states he is doing okay as he is trying to be more patient while waiting to be discharged to cris home. Patient states he was reassured by his mother yesterday that he may be able to discharge to the [facility] home sometime next week. Patient presents as less frustrated , pleasant, calm and appropriate. He does not appear to be responding to internal stimuli during this assessment.  He denies both suicidal and homicidal ideation. He denies both auditory and visual hallucinations. No paranoid ideation observed during this assessment. Of note, the charge nurse informed writer this afternoon that patient just told her that his sister, Traci is agreeable to him discharging this afternoon and staying with her. Writer called Traci for clarification. Sister denied neither telling patient he can be discharged this afternoon nor informing him he can live with her. Sister said he only told patient he might be having placement interview either next week Monday or Tuesday while encouraging him to remain patient with the hope of leaving sometime next week. I later told patient about by conversation with his sister as the plan remains for him to stay in the hospital pending the " "opening at the crises home. Patient verbalized good understanding.      MEDICATIONS   Medications:  Scheduled Meds:    calcium polycarbophil  1,250 mg Oral BID     DULoxetine  120 mg Oral Daily     hydrochlorothiazide  25 mg Oral Daily     liraglutide - Weight Management  3 mg Subcutaneous Daily     lisinopril  10 mg Oral QAM     mirabegron  50 mg Oral Daily     naproxen sodium  440 mg Oral BID w/meals     OLANZapine zydis  10 mg Oral At Bedtime     oxybutynin ER  30 mg Oral Daily     pantoprazole  40 mg Oral QAM AC     prazosin  3 mg Oral At Bedtime     vitamin D3  50 mcg Oral Daily     Continuous Infusions:  PRN Meds:.acetaminophen, albuterol, alum & mag hydroxide-simethicone, hydrOXYzine, nicotine, OLANZapine **OR** OLANZapine, senna-docusate, traZODone    Medication adherence issues: MS Med Adherence Y/N: No  Medication side effects: MEDICATION SIDE EFFECTS: no side effects reported  Benefit: Yes / No: Yes       ROS   A 10-point review of systems was completed and is otherwise negative with the exception of HPI.       MENTAL STATUS EXAM   Vitals: /73 (BP Location: Right arm, Patient Position: Sitting, Cuff Size: Adult Regular)   Pulse 79   Temp 97.6  F (36.4  C) (Oral)   Resp 16   Ht 1.83 m (6' 0.05\")   Wt (!) 167 kg (368 lb 2.7 oz)   SpO2 98%   BMI 49.87 kg/m      Appearance:  Casually groomed  Mood:  {Mood: Anxious  and Euthymic  Affect: full range  was congruent to speech  Suicidal Ideation: PRESENT / ABSENT: absent   Homicidal Ideation: PRESENT / ABSENT: absent   Thought process: concrete   Thought content: denies suicidal and homicidal ideations. Denies paranoid ideation and delusion   Fund of Knowledge: Below average  Attention/Concentration: Fair  Language ability:  Intact  Memory:  Immediate recall intact, Short-term memory intact and Long-term memory intact  Insight:  limited.  Judgement: limited  Orientation: Yes, x4  Psychomotor Behavior: denies tardive dyskinesia, dystonia or " tics  Muscle Strength and Tone: MuscleStrength: Normal  Gait and Station: Normal       LABS   personally reviewed.     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)    Active Problem List:  Patient Active Problem List   Diagnosis     Speech/language delay     Tobacco use disorder     Nocturnal enuresis     Moderate major depression (H)     LOREN (obstructive sleep apnea)     Essential hypertension     Morbid obesity (H)     Leukocytosis     Suicidal ideation     Chronic low back pain     Bilateral low back pain with left-sided sciatica     History of colonic polyps     Mild intellectual disability     S/P total hip arthroplasty     Vitamin D deficiency     LPRD (laryngopharyngeal reflux disease)     Patel's esophagus determined by biopsy     Mild intermittent asthma     Somatic dysfunction of lumbar region     Somatic dysfunction of sacral region     Sacral pain     Thoracic segment dysfunction     Hip pain, left     Duodenitis     Overactive bladder     Venous stasis dermatitis of both lower extremities     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 Ongoing education given regarding diagnostic and treatment options with risks, benefits and alternatives and adequate verbalization of understanding.  2. Medications: Cymbalta 120 mg                           Zyprexa 10 mg starting 4/27/22  3) Hospitalist consult: Hospitalist to see patient as needed  4) Legal: Voluntary  5)  to follow regarding collecting and reviewing collateral information, referrals and disposition planning     Risk Assessment: [facility] RISK ASSESSMENT: Patient able to contract for safety    Coordination of Care:   Treatment Plan reviewed and physician signed, Care discussed  with Care/Treatment Team Members, Chart reviewed and Patient seen      Re-Certification I certify that the inpatient psychiatric facility services furnished since the previous certification were, and continue to be, medically necessary for, either, treatment which could reasonably be expected to improve the patient s condition or diagnostic study and that the hospital records indicate that the services furnished were, either, intensive treatment services, admission and related services necessary for diagnostic study, or equivalent services.     I certify that the patient continues to need, on a daily basis, active treatment furnished directly by or requiring the supervision of inpatient psychiatric facility personnel.   I estimate 5-7 days of hospitalization is necessary for proper treatment of the patient. My plans for post-hospital care for this patient are  adult foster care     LAMBERT Zhu CNP    -     5/6/2022     -     10:55 AM    Total time  25 minutes with > 50%spent on coordination of cares and psycho-education.    This note was created with help of Dragon dictation system. Grammatical / typing errors are not intentional.    LAMBERT Zhu CNP

## 2022-05-06 NOTE — CONSULTS
[facility]  Consult Note - Hospitalist Service  Date of Admission:  4/25/2022  Consult Requested by: Sukhwinder Bolaños APRN CNP  Reason for Consult:deep cracked heel and toe in the bilateral foot     Assessment & Plan   Kimo Greenwood is a 41 year old male admitted on 4/25/2022 for worsening paranoia. He has a PMH of anxiety, PTSD, MDD, polysubstance abuse (alcohol, tobacco, and cannabis), SI,  mild intellectual disability, leukocytosis, asthma, and hypertension. [facility] re-consulted for deep cracked heel and toe B/L feet.      HMS will sign off as there are no acute issues.    # Deeply cracked heel and toes, B/L feet  [facility] consulted for deeply cracked heel and toes. Very dry skin with some calluses on big toes that ar cracked. No erythema, increased pain, drainage. Pt reporting that he has pain in his feet when he wears his shoes and he does not clip his toenails. No issues with his feet while walking. WBC decreasing and afebrile.    - clean feet every morning with soap an water  - apply Aquaphor-ordered   - put on clean socks  - wear socks with shoes  - discussed keeping feet MELL at night   -don't walk barefoot in the hospital, educated and pt acknowledged  - follow-up with PCP or podiatry at discharge        The patient's care was discussed with the Bedside Nurse and Patient.    LAMBERT Sandoval CNP  [facility]  Securely message with the Vocera Web Console (learn more here)  Text page via [employer] Paging[facility]       Hospitalist Service

## 2022-05-06 NOTE — PLAN OF CARE
Problem: Suicidal Behavior  Goal: Suicidal Behavior is Absent or Managed  Outcome: Ongoing, Progressing     Problem: Pain Acute  Goal: Acceptable Pain Control and Functional Ability  Outcome: Ongoing, Progressing  Intervention: Prevent or Manage Pain  Recent Flowsheet Documentation  Taken 5/6/2022 0904 by Nisreen Soria RN  Medication Review/Management: medications reviewed     Problem: Activity and Energy Impairment (Anxiety Signs/Symptoms)  Goal: Optimized Energy Level (Anxiety Signs/Symptoms)  Outcome: Ongoing, Progressing  Intervention: Optimize Energy Level  Recent Flowsheet Documentation  Taken 5/6/2022 0904 by Nisreen Soria RN  Activity (Behavioral Health):   activity encouraged   up ad leanna   Goal Outcome Evaluation:    Plan of Care Reviewed With: patient      Patient calm and polite with request. Visible on the unit with minimal interactions observed. Denied pain, and all psych symptoms and contracted for safety.  PRN nicotine tammy.x 3,  Medication compliant. No behaviors noted. Patient currently on medical bed due to respiratory issues. Will continue to monitor.

## 2022-05-06 NOTE — PLAN OF CARE
Assessment/Intervention/Current Symptoms and Care Coordination  Writer met with patient to discuss discharge. Patient has been accepted for placement at a crisis residence. The next step in the process is an interview with [facility] residence. [facility] will conduct an interview on Monday or Tuesday with patient. He then needs Critical access hospital approval. After these two steps are complete a discharge date will be set with patient going from the hospital directly to residence. Writer has explained process to patient times two. Writer has been in contact with Guardian (sister Traci). Patient will NOT be discharging to sister's home. Patient will discharge directly to placement.      Writer received verbal confirmation of understanding from patient.      Discharge Plan or Goal   [facility] ( [facility]) in [place]     Barriers to Discharge   Symptom stabilization, medication management care coordination      Referral Status Pending. Guardian has declined IRTS referrals. [facility] has received a referral for a [facility] residence contact Mehul .     Writer left message for Mehul regarding clinical Review for [facility] .  Writer provided updated information as requested by [facility] on 5/05 . A [facility] ( [facility]) has bed availability.    On 05/02 Guardian stated that patient has a 30 day supply of medication at home and would only need to fill the olanzapine 10 mg as this was changed during this hospitalization.     Contacts:  Therapist:  Kulwant Hernandezx2/wk and Kathrynx1/wk. Location: [place]. Date of last visit: unknown. Frequency: see before. Perceived helpfulness: helpful.    Psychiatrist: Traci Crespo. Location: [place]. Date of last visit: 3/16/2022. Frequency: 1 time a month. Perceived helpfulness: helpful.    :  [place]. Location: [place] [phone].  Date of last visit: unknown. Frequency: unknown. Perceived helpfulness: unknown.    Guardian Ryann [phone] and sister Traci Greenwood [phone].     Primary Care Provider: Kory Hudson MD. Location: unknown. Date of last visit: unknown. Frequency: unknown. Perceived helpfulness: helpful    Legal Status  voluntary      Cintia Moreno, [facility], [facility], 05/05/2022

## 2022-05-07 PROCEDURE — 250N000013 HC RX MED GY IP 250 OP 250 PS 637: Performed by: PSYCHIATRY & NEUROLOGY

## 2022-05-07 PROCEDURE — 94660 CPAP INITIATION&MGMT: CPT

## 2022-05-07 PROCEDURE — 250N000013 HC RX MED GY IP 250 OP 250 PS 637

## 2022-05-07 PROCEDURE — 250N000013 HC RX MED GY IP 250 OP 250 PS 637: Performed by: NURSE PRACTITIONER

## 2022-05-07 PROCEDURE — 128N000001 HC R&B CD/MH ADULT

## 2022-05-07 RX ORDER — LIRAGLUTIDE 6 MG/ML
INJECTION, SOLUTION SUBCUTANEOUS
Qty: 15 ML | Refills: 3 | Status: SHIPPED | OUTPATIENT
Start: 2022-05-07 | End: 2022-09-19

## 2022-05-07 RX ADMIN — NAPROXEN SODIUM 440 MG: 220 TABLET, FILM COATED ORAL at 08:44

## 2022-05-07 RX ADMIN — NICOTINE POLACRILEX 2 MG: 2 LOZENGE ORAL at 20:12

## 2022-05-07 RX ADMIN — PRAZOSIN HYDROCHLORIDE 3 MG: 1 CAPSULE ORAL at 20:12

## 2022-05-07 RX ADMIN — HYDROCHLOROTHIAZIDE 25 MG: 25 TABLET ORAL at 08:45

## 2022-05-07 RX ADMIN — NICOTINE POLACRILEX 2 MG: 2 LOZENGE ORAL at 12:18

## 2022-05-07 RX ADMIN — Medication 50 MCG: at 08:45

## 2022-05-07 RX ADMIN — MIRABEGRON 50 MG: 25 TABLET, FILM COATED, EXTENDED RELEASE ORAL at 08:44

## 2022-05-07 RX ADMIN — NICOTINE POLACRILEX 2 MG: 2 LOZENGE ORAL at 16:03

## 2022-05-07 RX ADMIN — OLANZAPINE 10 MG: 10 TABLET, ORALLY DISINTEGRATING ORAL at 20:12

## 2022-05-07 RX ADMIN — NICOTINE POLACRILEX 2 MG: 2 LOZENGE ORAL at 08:58

## 2022-05-07 RX ADMIN — NICOTINE POLACRILEX 2 MG: 2 LOZENGE ORAL at 18:59

## 2022-05-07 RX ADMIN — NAPROXEN SODIUM 440 MG: 220 TABLET, FILM COATED ORAL at 16:03

## 2022-05-07 RX ADMIN — OXYBUTYNIN CHLORIDE 30 MG: 10 TABLET, EXTENDED RELEASE ORAL at 08:44

## 2022-05-07 RX ADMIN — DULOXETINE 120 MG: 60 CAPSULE, DELAYED RELEASE ORAL at 08:45

## 2022-05-07 RX ADMIN — CALCIUM POLYCARBOPHIL 1250 MG: 625 TABLET, FILM COATED ORAL at 08:44

## 2022-05-07 RX ADMIN — LISINOPRIL 10 MG: 10 TABLET ORAL at 08:45

## 2022-05-07 RX ADMIN — CALCIUM POLYCARBOPHIL 1250 MG: 625 TABLET, FILM COATED ORAL at 20:12

## 2022-05-07 RX ADMIN — NICOTINE POLACRILEX 2 MG: 2 LOZENGE ORAL at 10:24

## 2022-05-07 RX ADMIN — NICOTINE POLACRILEX 2 MG: 2 LOZENGE ORAL at 17:33

## 2022-05-07 RX ADMIN — NICOTINE POLACRILEX 2 MG: 2 LOZENGE ORAL at 21:33

## 2022-05-07 RX ADMIN — PANTOPRAZOLE SODIUM 40 MG: 40 TABLET, DELAYED RELEASE ORAL at 06:50

## 2022-05-07 RX ADMIN — NICOTINE POLACRILEX 2 MG: 2 LOZENGE ORAL at 06:47

## 2022-05-07 ASSESSMENT — ACTIVITIES OF DAILY LIVING (ADL)
ORAL_HYGIENE: INDEPENDENT
DRESS: INDEPENDENT
HYGIENE/GROOMING: INDEPENDENT
LAUNDRY: WITH SUPERVISION

## 2022-05-07 NOTE — PROGRESS NOTES
Pt was present for community but declined face to face invite to join OT group directly afterwards.       05/07/22 1102   Engagement   Intervention Group   Topic Detail [place] Ante for concentration, building self-awareness, coping, relaxation, reality based activity, symptom management, healthy leisure and socialization   Attendance Did not attend   Reason for Not Attending Refused

## 2022-05-07 NOTE — PLAN OF CARE
Problem: Behavioral Health Plan of Care  Goal: Adheres to Safety Considerations for Self and Others  Outcome: Ongoing, Progressing  Intervention: Develop and Maintain Individualized Safety Plan  Recent Flowsheet Documentation  Taken 5/6/2022 2352 by Hesham Nieves, RN  Safety Measures: safety rounds completed     Problem: Sleep Disturbance  Goal: Adequate Sleep/Rest  Outcome: Ongoing, Progressing     Problem: Suicidal Behavior  Goal: Suicidal Behavior is Absent or Managed  Outcome: Ongoing, Progressing   Goal Outcome Evaluation:      Pt slept more than six hours and came out once for snacks and declined any prn sleeping aid. Pt CPAP on and patent and no outburst behavior observed or reported.

## 2022-05-07 NOTE — PLAN OF CARE
Problem: Behavioral Health Plan of Care  Goal: Plan of Care Review  Outcome: Ongoing, Progressing  Goal: Patient-Specific Goal (Individualization)  Outcome: Ongoing, Progressing  Goal: Adheres to Safety Considerations for Self and Others  Outcome: Ongoing, Progressing  Goal: Absence of New-Onset Illness or Injury  Outcome: Ongoing, Progressing  Goal: Optimal Comfort and Wellbeing  Outcome: Ongoing, Progressing  Goal: Optimized Coping Skills in Response to Life Stressors  Outcome: Ongoing, Progressing  Goal: Develops/Participates in Therapeutic [place] to Support Successful Transition  Outcome: Ongoing, Progressing  Goal: Team Discussion  Outcome: Ongoing, Progressing     Problem: Noninvasive Ventilation Acute  Goal: Effective Unassisted Ventilation and Oxygenation  Outcome: Ongoing, Progressing     Problem: Sleep Disturbance  Goal: Adequate Sleep/Rest  Outcome: Ongoing, Progressing     Problem: Suicidal Behavior  Goal: Suicidal Behavior is Absent or Managed  Outcome: Ongoing, Progressing     Problem: Violence Risk or Actual  Goal: Anger and Impulse Control  Outcome: Ongoing, Progressing     Problem: Pain Acute  Goal: Acceptable Pain Control and Functional Ability  Outcome: Ongoing, Progressing     Problem: Activity and Energy Impairment (Anxiety Signs/Symptoms)  Goal: Optimized Energy Level (Anxiety Signs/Symptoms)  Outcome: Ongoing, Progressing     Problem: Cognitive Impairment (Anxiety Signs/Symptoms)  Goal: Optimized Cognitive Function (Anxiety Signs/Symptoms)  Outcome: Ongoing, Progressing     Problem: Mood Impairment (Anxiety Signs/Symptoms)  Goal: Improved Mood Symptoms (Anxiety Signs/Symptoms)  Outcome: Ongoing, Progressing     Problem: Sleep Impairment (Anxiety Signs/Symptoms)  Goal: Improved Sleep (Anxiety Signs/Symptoms)  Outcome: Ongoing, Progressing     Problem: Social, Occupational or Functional Impairment (Anxiety Signs/Symptoms)  Goal: Enhanced Social, Occupational or Functional Skills (Anxiety  Signs/Symptoms)  Outcome: Ongoing, Progressing     Problem: Somatic Disturbance (Anxiety Signs/Symptoms)  Goal: Improved Somatic Symptoms (Anxiety Signs/Symptoms)  Outcome: Ongoing, Progressing   Goal Outcome Evaluation:  Pt has been up on the unit most of this shift. Pt denies all psych symptoms. Pt rates pain at 8/10. Pt has been medication compliant. Pt has been social with peers and staff.

## 2022-05-07 NOTE — PLAN OF CARE
Problem: Suicidal Behavior  Goal: Suicidal Behavior is Absent or Managed  5/6/2022 2159 by Nisreen Soria RN  Outcome: Ongoing, Progressing  5/6/2022 1406 by Nisreen Soria RN  Outcome: Ongoing, Progressing     Problem: Violence Risk or Actual  Goal: Anger and Impulse Control  Outcome: Ongoing, Progressing     Problem: Pain Acute  Goal: Acceptable Pain Control and Functional Ability  5/6/2022 2159 by Nisreen Soria RN  Outcome: Ongoing, Progressing  5/6/2022 1406 by Nisreen Soria RN  Outcome: Ongoing, Progressing  Intervention: Prevent or Manage Pain  Recent Flowsheet Documentation  Taken 5/6/2022 1903 by Nisreen Soria RN  Medication Review/Management: medications reviewed  Taken 5/6/2022 0904 by Nisreen Soria RN  Medication Review/Management: medications reviewed     Problem: Activity and Energy Impairment (Anxiety Signs/Symptoms)  Goal: Optimized Energy Level (Anxiety Signs/Symptoms)  5/6/2022 2159 by Nisreen Soria RN  Outcome: Ongoing, Progressing  5/6/2022 1406 by Nisreen Soria RN  Outcome: Ongoing, Progressing  Intervention: Optimize Energy Level  Recent Flowsheet Documentation  Taken 5/6/2022 1903 by Nisreen Soria RN  Activity (Behavioral Health):   activity encouraged   up ad leanna  Taken 5/6/2022 0904 by Nisreen Soria RN  Activity (Behavioral Health):   activity encouraged   up ad leanna   Goal Outcome Evaluation:    Plan of Care Reviewed With: patient     Patient spent the evening watching movies in the lounge. calm and cooperative with cares and medications. Denied pain, anxiety, depression, SI/SIB, visual and auditory hallucinations and contracted for safety. Medical bed in place due to respiratory  issues. Will continue plan of care.

## 2022-05-07 NOTE — PLAN OF CARE
Problem: Behavioral Health Plan of Care  Goal: Plan of Care Review  Outcome: Ongoing, Progressing   Goal Outcome Evaluation:      Pt. Is using CPAP mask at night and tolerating well. Continue current orders.

## 2022-05-08 PROCEDURE — 250N000013 HC RX MED GY IP 250 OP 250 PS 637: Performed by: PSYCHIATRY & NEUROLOGY

## 2022-05-08 PROCEDURE — 128N000001 HC R&B CD/MH ADULT

## 2022-05-08 PROCEDURE — 94660 CPAP INITIATION&MGMT: CPT

## 2022-05-08 PROCEDURE — 250N000013 HC RX MED GY IP 250 OP 250 PS 637

## 2022-05-08 PROCEDURE — 250N000013 HC RX MED GY IP 250 OP 250 PS 637: Performed by: NURSE PRACTITIONER

## 2022-05-08 RX ADMIN — NICOTINE POLACRILEX 2 MG: 2 LOZENGE ORAL at 18:34

## 2022-05-08 RX ADMIN — NAPROXEN SODIUM 440 MG: 220 TABLET, FILM COATED ORAL at 17:23

## 2022-05-08 RX ADMIN — Medication 50 MCG: at 08:03

## 2022-05-08 RX ADMIN — PRAZOSIN HYDROCHLORIDE 3 MG: 1 CAPSULE ORAL at 20:03

## 2022-05-08 RX ADMIN — DULOXETINE 120 MG: 60 CAPSULE, DELAYED RELEASE ORAL at 08:03

## 2022-05-08 RX ADMIN — NICOTINE POLACRILEX 2 MG: 2 LOZENGE ORAL at 08:02

## 2022-05-08 RX ADMIN — CALCIUM POLYCARBOPHIL 1250 MG: 625 TABLET, FILM COATED ORAL at 08:09

## 2022-05-08 RX ADMIN — PANTOPRAZOLE SODIUM 40 MG: 40 TABLET, DELAYED RELEASE ORAL at 08:02

## 2022-05-08 RX ADMIN — NICOTINE POLACRILEX 2 MG: 2 LOZENGE ORAL at 19:35

## 2022-05-08 RX ADMIN — LISINOPRIL 10 MG: 10 TABLET ORAL at 08:02

## 2022-05-08 RX ADMIN — NICOTINE POLACRILEX 2 MG: 2 LOZENGE ORAL at 22:09

## 2022-05-08 RX ADMIN — OLANZAPINE 10 MG: 10 TABLET, ORALLY DISINTEGRATING ORAL at 20:03

## 2022-05-08 RX ADMIN — NAPROXEN SODIUM 440 MG: 220 TABLET, FILM COATED ORAL at 08:02

## 2022-05-08 RX ADMIN — NICOTINE POLACRILEX 2 MG: 2 LOZENGE ORAL at 14:11

## 2022-05-08 RX ADMIN — OXYBUTYNIN CHLORIDE 30 MG: 10 TABLET, EXTENDED RELEASE ORAL at 08:02

## 2022-05-08 RX ADMIN — NICOTINE POLACRILEX 2 MG: 2 LOZENGE ORAL at 15:25

## 2022-05-08 RX ADMIN — CALCIUM POLYCARBOPHIL 1250 MG: 625 TABLET, FILM COATED ORAL at 20:03

## 2022-05-08 RX ADMIN — MIRABEGRON 50 MG: 25 TABLET, FILM COATED, EXTENDED RELEASE ORAL at 08:02

## 2022-05-08 RX ADMIN — HYDROCHLOROTHIAZIDE 25 MG: 25 TABLET ORAL at 08:02

## 2022-05-08 RX ADMIN — NICOTINE POLACRILEX 2 MG: 2 LOZENGE ORAL at 17:24

## 2022-05-08 RX ADMIN — ACETAMINOPHEN 650 MG: 325 TABLET ORAL at 09:56

## 2022-05-08 RX ADMIN — NICOTINE POLACRILEX 2 MG: 2 LOZENGE ORAL at 09:52

## 2022-05-08 RX ADMIN — NICOTINE POLACRILEX 2 MG: 2 LOZENGE ORAL at 11:33

## 2022-05-08 ASSESSMENT — ACTIVITIES OF DAILY LIVING (ADL)
DRESS: INDEPENDENT
ORAL_HYGIENE: INDEPENDENT
HYGIENE/GROOMING: INDEPENDENT
HYGIENE/GROOMING: INDEPENDENT
LAUNDRY: WITH SUPERVISION
LAUNDRY: WITH SUPERVISION
DRESS: INDEPENDENT
ORAL_HYGIENE: INDEPENDENT

## 2022-05-08 NOTE — PROGRESS NOTES
Patient spent most of the the shift Watching TV and stayed at the Lounge area. Minimal interaction with peers, Quiet,calm and cooperative. Reports Hip and back pain 9/10. Pt received scheduled naproxen with good effect. Pt denies all psych symptoms. Ate 100% dinner. medication compliant.

## 2022-05-08 NOTE — PLAN OF CARE
Problem: Behavioral Health Plan of Care  Goal: Plan of Care Review  Outcome: Ongoing, Progressing   Goal Outcome Evaluation:      Pt. Is using CPAP mask at night and tolerating well. RN helped to placed on. Continue current orders.

## 2022-05-08 NOTE — PLAN OF CARE
Problem: Behavioral Health Plan of Care  Goal: Plan of Care Review  Outcome: Ongoing, Progressing     Problem: Sleep Disturbance  Goal: Adequate Sleep/Rest  Outcome: Ongoing, Progressing    D: Pt was in the bed all through the night and appeared to have slept fine with even and non-labored respiration observed during safety check. No complaint by Pt thus far. Patient denies pain, SI, HI, AV hallucination, and SIB. Will continue to monitor Pt.     A: Performed Q15 minute routine safety check     R: Pt slept majority of the night.

## 2022-05-08 NOTE — PLAN OF CARE
Problem: Suicidal Behavior  Goal: Suicidal Behavior is Absent or Managed  Outcome: Ongoing, Progressing     Problem: Pain Acute  Goal: Acceptable Pain Control and Functional Ability  Outcome: Ongoing, Progressing  Intervention: Develop Pain Management Plan  Recent Flowsheet Documentation  Taken 5/8/2022 0956 by Nisreen Soira RN  Pain Management Interventions: medication (see MAR)  Intervention: Prevent or Manage Pain  Recent Flowsheet Documentation  Taken 5/8/2022 0938 by Nisreen Soria RN  Medication Review/Management: medications reviewed     Problem: Activity and Energy Impairment (Anxiety Signs/Symptoms)  Goal: Optimized Energy Level (Anxiety Signs/Symptoms)  Outcome: Ongoing, Progressing  Intervention: Optimize Energy Level  Recent Flowsheet Documentation  Taken 5/8/2022 0938 by Nisreen Soria RN  Activity (Behavioral Health):   activity encouraged   up ad leanna     Problem: Mood Impairment (Anxiety Signs/Symptoms)  Goal: Improved Mood Symptoms (Anxiety Signs/Symptoms)  Outcome: Ongoing, Progressing     Problem: Somatic Disturbance (Anxiety Signs/Symptoms)  Goal: Improved Somatic Symptoms (Anxiety Signs/Symptoms)  Outcome: Ongoing, Progressing   Goal Outcome Evaluation:    Plan of Care Reviewed With: patient     Patient calm and pleasant all shift,  Reported Hip, back and knee pain 9/10, Prn tylenol given with minimal effect. Patient stated tylenol does not help, message left on sticky note for MD to addresses alternatives pain medications. Patient denied anxiety, depression, SI/SIB and contracted for safety. Ate 100% both meals and medication compliant, continue to have minimal interactions with peers. Patient remain on medical bed  due to respiratory issues. Will continue plan of care.

## 2022-05-08 NOTE — PLAN OF CARE
Problem: Behavioral Health Plan of Care  Goal: Adheres to Safety Considerations for Self and Others  Outcome: Ongoing, Progressing  Flowsheets (Taken 5/7/2022 2136)  Adheres to Safety Considerations for Self and Others: making progress toward outcome  Intervention: Develop and Maintain Individualized Safety Plan  Recent Flowsheet Documentation  Taken 5/7/2022 1611 by Tera Johnson RN  Safety Measures:   safety plan reviewed   safety rounds completed  Goal: Absence of New-Onset Illness or Injury  Outcome: Ongoing, Progressing  Intervention: Identify and Manage Fall Risk  Recent Flowsheet Documentation  Taken 5/7/2022 1611 by Tera Johnson RN  Safety Measures:   safety plan reviewed   safety rounds completed  Goal: Optimal Comfort and Wellbeing  Outcome: Ongoing, Progressing     Problem: Suicidal Behavior  Goal: Suicidal Behavior is Absent or Managed  Outcome: Ongoing, Progressing  Flowsheets (Taken 5/7/2022 2136)  Mutually Determined Action Steps (Suicidal Behavior Absent/Managed): verbalizes safety check rationale     Problem: Violence Risk or Actual  Goal: Anger and Impulse Control  Outcome: Ongoing, Progressing  Intervention: Minimize Safety Risk  Recent Flowsheet Documentation  Taken 5/7/2022 1611 by Tera Johnson RN  Behavior Management: behavioral plan reviewed  Intervention: Promote Self-Control  Recent Flowsheet Documentation  Taken 5/7/2022 1611 by Tera Johnson RN  Supportive Measures: active listening utilized  Environmental Support: calm environment promoted     Problem: Pain Acute  Goal: Acceptable Pain Control and Functional Ability  Outcome: Ongoing, Progressing  Intervention: Prevent or Manage Pain  Recent Flowsheet Documentation  Taken 5/7/2022 1611 by Tera Johnson RN  Medication Review/Management: medications reviewed  Intervention: Optimize Psychosocial Wellbeing  Recent Flowsheet Documentation  Taken 5/7/2022 1611 by Tera Johnson RN  Supportive Measures: active  listening utilized     Problem: Mood Impairment (Anxiety Signs/Symptoms)  Goal: Improved Mood Symptoms (Anxiety Signs/Symptoms)  Outcome: Ongoing, Progressing  Flowsheets (Taken 5/7/2022 2136)  Mutually Determined Action Steps (Improved Mood Symptoms): adheres to medication regimen  Intervention: Optimize Emotion and Mood  Recent Flowsheet Documentation  Taken 5/7/2022 1611 by Tera Johnson RN  Supportive Measures: active listening utilized     Problem: Sleep Impairment (Anxiety Signs/Symptoms)  Goal: Improved Sleep (Anxiety Signs/Symptoms)  Outcome: Ongoing, Progressing  Flowsheets (Taken 5/7/2022 2136)  Mutually Determined Action Steps (Improved Sleep):   uses relaxation techniques   remains out of bed during day   Goal Outcome Evaluation:    Plan of Care Reviewed With: patient

## 2022-05-09 PROCEDURE — 99232 SBSQ HOSP IP/OBS MODERATE 35: CPT | Performed by: NURSE PRACTITIONER

## 2022-05-09 PROCEDURE — 250N000013 HC RX MED GY IP 250 OP 250 PS 637: Performed by: PSYCHIATRY & NEUROLOGY

## 2022-05-09 PROCEDURE — 128N000001 HC R&B CD/MH ADULT

## 2022-05-09 PROCEDURE — 250N000013 HC RX MED GY IP 250 OP 250 PS 637

## 2022-05-09 PROCEDURE — 250N000013 HC RX MED GY IP 250 OP 250 PS 637: Performed by: NURSE PRACTITIONER

## 2022-05-09 RX ADMIN — DULOXETINE 120 MG: 60 CAPSULE, DELAYED RELEASE ORAL at 08:20

## 2022-05-09 RX ADMIN — NICOTINE POLACRILEX 2 MG: 2 LOZENGE ORAL at 21:08

## 2022-05-09 RX ADMIN — NAPROXEN SODIUM 440 MG: 220 TABLET, FILM COATED ORAL at 16:03

## 2022-05-09 RX ADMIN — OLANZAPINE 10 MG: 10 TABLET, ORALLY DISINTEGRATING ORAL at 20:05

## 2022-05-09 RX ADMIN — Medication 50 MCG: at 08:19

## 2022-05-09 RX ADMIN — NICOTINE POLACRILEX 2 MG: 2 LOZENGE ORAL at 16:21

## 2022-05-09 RX ADMIN — CALCIUM POLYCARBOPHIL 1250 MG: 625 TABLET, FILM COATED ORAL at 20:05

## 2022-05-09 RX ADMIN — NICOTINE POLACRILEX 2 MG: 2 LOZENGE ORAL at 08:47

## 2022-05-09 RX ADMIN — LISINOPRIL 10 MG: 10 TABLET ORAL at 08:19

## 2022-05-09 RX ADMIN — PANTOPRAZOLE SODIUM 40 MG: 40 TABLET, DELAYED RELEASE ORAL at 08:19

## 2022-05-09 RX ADMIN — NICOTINE POLACRILEX 2 MG: 2 LOZENGE ORAL at 13:11

## 2022-05-09 RX ADMIN — PRAZOSIN HYDROCHLORIDE 3 MG: 1 CAPSULE ORAL at 20:05

## 2022-05-09 RX ADMIN — NICOTINE POLACRILEX 2 MG: 2 LOZENGE ORAL at 11:34

## 2022-05-09 RX ADMIN — CALCIUM POLYCARBOPHIL 1250 MG: 625 TABLET, FILM COATED ORAL at 08:19

## 2022-05-09 RX ADMIN — NAPROXEN SODIUM 440 MG: 220 TABLET, FILM COATED ORAL at 08:20

## 2022-05-09 RX ADMIN — NICOTINE POLACRILEX 2 MG: 2 LOZENGE ORAL at 14:41

## 2022-05-09 RX ADMIN — OXYBUTYNIN CHLORIDE 30 MG: 10 TABLET, EXTENDED RELEASE ORAL at 08:20

## 2022-05-09 RX ADMIN — HYDROCHLOROTHIAZIDE 25 MG: 25 TABLET ORAL at 08:20

## 2022-05-09 RX ADMIN — NICOTINE POLACRILEX 2 MG: 2 LOZENGE ORAL at 19:14

## 2022-05-09 RX ADMIN — MIRABEGRON 50 MG: 25 TABLET, FILM COATED, EXTENDED RELEASE ORAL at 08:20

## 2022-05-09 ASSESSMENT — ACTIVITIES OF DAILY LIVING (ADL)
ORAL_HYGIENE: INDEPENDENT
DRESS: INDEPENDENT
HYGIENE/GROOMING: INDEPENDENT

## 2022-05-09 NOTE — PLAN OF CARE
Problem: Behavioral Health Plan of Care  Goal: Plan of Care Review  Outcome: Ongoing, Progressing     Problem: Noninvasive Ventilation Acute  Goal: Effective Unassisted Ventilation and Oxygenation  Outcome: Ongoing, Progressing     Problem: Sleep Disturbance  Goal: Adequate Sleep/Rest  Outcome: Ongoing, Progressing     Problem: Suicidal Behavior  Goal: Suicidal Behavior is Absent or Managed  Outcome: Ongoing, Progressing     Problem: Violence Risk or Actual  Goal: Anger and Impulse Control  Outcome: Ongoing, Progressing     Problem: Activity and Energy Impairment (Anxiety Signs/Symptoms)  Goal: Optimized Energy Level (Anxiety Signs/Symptoms)  Outcome: Ongoing, Progressing     Problem: Cognitive Impairment (Anxiety Signs/Symptoms)  Goal: Optimized Cognitive Function (Anxiety Signs/Symptoms)  Outcome: Ongoing, Progressing   Goal Outcome Evaluation:       Up and out on the unit once for snacks and returned to bed. Pt appears to be sleeping comfortably during all safety checks. No signs of pain, discomfort or abnormal behaviors noted. Will continue with same plan of care.

## 2022-05-09 NOTE — PROGRESS NOTES
Reports Hip and back pain 9/10. Pt received scheduled naproxen with good effect. Spent most of the the shift Watching TV and stayed at the Lounge area. Minimal interaction with peers, Quiet,calm and cooperative. Pt denies all psych symptoms. Ate 100% dinner. medication compliant.PRNS Nicotine lozenge qhr per pt request

## 2022-05-09 NOTE — PROGRESS NOTES
05/09/22 1521   Engagement   Intervention Group   Topic Detail Watercolor pencil cards and group discussion on reconnecting with friends/family for creative expression, building self awareness, symptom management, reality based activity, coping, relaxation, socialization and an opportunity to experience success   Attendance Did not attend

## 2022-05-09 NOTE — PROGRESS NOTES
"PSYCHIATRY  PROGRESS NOTE     DATE OF SERVICE   5/9/2022         CHIEF COMPLAINT   \"I'm having interview today\"       SUBJECTIVE   Nursing reports:  Patient spent time between his room and the lounge, minimally engaged with peers. He complained of back pain and took PRN pain meds with good relief. He denies all psych symptoms.      reports: Patient has been accepted to the [facility] [place] [facility]. Patient had interview with [facility] residence with the plan to discharge on Wednesday 5/11         OBJECTIVE   Chart reviewed and patient assessed. Patient was seen and evaluated in his room by himself. Upon patient interview, patient states he is looking forward to having interview with the treatment residence this morning. Patient presents as calm, pleasant and appropriate. Writer informed patient the interview could be either today or tomorrow so he would not be totally disappointed if the interview did not occur today. He does not appear to be responding to internal stimuli during this assessment.  He denies both suicidal and homicidal ideation. He denies both auditory and visual hallucinations. No paranoid ideation observed during this assessment.      MEDICATIONS   Medications:  Scheduled Meds:    calcium polycarbophil  1,250 mg Oral BID     DULoxetine  120 mg Oral Daily     hydrochlorothiazide  25 mg Oral Daily     liraglutide - Weight Management  3 mg Subcutaneous Daily     lisinopril  10 mg Oral QAM     mirabegron  50 mg Oral Daily     naproxen sodium  440 mg Oral BID w/meals     OLANZapine zydis  10 mg Oral At Bedtime     oxybutynin ER  30 mg Oral Daily     pantoprazole  40 mg Oral QAM AC     prazosin  3 mg Oral At Bedtime     vitamin D3  50 mcg Oral Daily     Continuous Infusions:  PRN Meds:.acetaminophen, albuterol, alum & mag hydroxide-simethicone, hydrOXYzine, mineral oil-hydrophilic petrolatum, nicotine, OLANZapine **OR** OLANZapine, senna-docusate, " "traZODone    Medication adherence issues: MS Med Adherence Y/N: No  Medication side effects: MEDICATION SIDE EFFECTS: no side effects reported  Benefit: Yes / No: Yes       ROS   A 10-point review of systems was completed and is otherwise negative with the exception of HPI.       MENTAL STATUS EXAM   Vitals: BP (!) 147/71   Pulse 99   Temp 98.5  F (36.9  C) (Oral)   Resp 18   Ht 1.83 m (6' 0.05\")   Wt (!) 167 kg (368 lb 2.7 oz)   SpO2 96%   BMI 49.87 kg/m      Appearance:  Casually groomed  Mood:  {Mood: Anxious  and Euthymic  Affect: full range  was congruent to speech  Suicidal Ideation: PRESENT / ABSENT: absent   Homicidal Ideation: PRESENT / ABSENT: absent   Thought process: concrete   Thought content: denies suicidal and homicidal ideations. Denies paranoid ideation and delusion   Fund of Knowledge: Below average  Attention/Concentration: Fair  Language ability:  Intact  Memory:  Immediate recall intact, Short-term memory intact and Long-term memory intact  Insight:  limited.  Judgement: limited  Orientation: Yes, x4  Psychomotor Behavior: denies tardive dyskinesia, dystonia or tics  Muscle Strength and Tone: MuscleStrength: Normal  Gait and Station: Normal       LABS   personally reviewed.     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)    Active Problem List:  Patient Active Problem List   Diagnosis     Speech/language delay     Tobacco use disorder     Nocturnal enuresis     Moderate major depression (H)     LOREN (obstructive sleep apnea)     Essential hypertension     Morbid obesity (H)     Leukocytosis     Suicidal ideation     Chronic low back pain     Bilateral low back pain with left-sided sciatica     History of colonic polyps     Mild intellectual disability     S/P total hip arthroplasty     Vitamin D deficiency     LPRD (laryngopharyngeal reflux disease)     Patel's " esophagus determined by biopsy     Mild intermittent asthma     Somatic dysfunction of lumbar region     Somatic dysfunction of sacral region     Sacral pain     Thoracic segment dysfunction     Hip pain, left     Duodenitis     Overactive bladder     Venous stasis dermatitis of both lower extremities     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 Ongoing education given regarding diagnostic and treatment options with risks, benefits and alternatives and adequate verbalization of understanding.  2. Medications: Cymbalta 120 mg                           Zyprexa 10 mg starting 4/27/22  3) Hospitalist consult: Hospitalist to see patient as needed  4) Legal: Voluntary  5)  to follow regarding collecting and reviewing collateral information, referrals and disposition planning     Risk Assessment: [facility] RISK ASSESSMENT: Patient able to contract for safety    Coordination of Care:   Treatment Plan reviewed and physician signed, Care discussed with Care/Treatment Team Members, Chart reviewed and Patient seen      Re-Certification I certify that the inpatient psychiatric facility services furnished since the previous certification were, and continue to be, medically necessary for, either, treatment which could reasonably be expected to improve the patient s condition or diagnostic study and that the hospital records indicate that the services furnished were, either, intensive treatment services, admission and related services necessary for diagnostic study, or equivalent services.     I certify that the patient continues to need, on a daily basis, active treatment furnished directly by or requiring the supervision of inpatient psychiatric facility personnel.   I estimate 5-7 days of hospitalization is necessary for proper treatment of the patient. My plans for  post-hospital care for this patient are  adult foster care     LAMBERT Zhu CNP    -     5/6/2022     -     11:53 AM    Total time  25 minutes with > 50%spent on coordination of cares and psycho-education.    This note was created with help of Dragon dictation system. Grammatical / typing errors are not intentional.    LAMBERT Zhu CNP

## 2022-05-09 NOTE — PLAN OF CARE
Problem: Behavioral Health Plan of Care  Goal: Plan of Care Review  Outcome: Ongoing, Progressing  Flowsheets  Taken 5/8/2022 2126  Plan of Care Reviewed With: patient  Overall Patient Progress: improving  Patient Agreement with Plan of Care: agrees  Taken 5/8/2022 1856  Plan of Care Reviewed With: patient  Patient Agreement with Plan of Care: agrees  Goal: Adheres to Safety Considerations for Self and Others  Outcome: Ongoing, Progressing  Flowsheets (Taken 5/8/2022 2126)  Adheres to Safety Considerations for Self and Others: making progress toward outcome  Intervention: Develop and Maintain Individualized Safety Plan  Recent Flowsheet Documentation  Taken 5/8/2022 1856 by Tera Johnson RN  Safety Measures: safety rounds completed  Goal: Absence of New-Onset Illness or Injury  Outcome: Ongoing, Progressing  Intervention: Identify and Manage Fall Risk  Recent Flowsheet Documentation  Taken 5/8/2022 1856 by Tera Johnson RN  Safety Measures: safety rounds completed  Goal: Optimal Comfort and Wellbeing  Outcome: Ongoing, Progressing  Intervention: Provide Person-Centered Care  Recent Flowsheet Documentation  Taken 5/8/2022 1856 by Tera Johnson RN  Trust Relationship/Rapport:   care explained   emotional support provided   questions answered  Goal: Optimized Coping Skills in Response to Life Stressors  Outcome: Ongoing, Progressing  Flowsheets (Taken 5/8/2022 2126)  Optimized Coping Skills in Response to Life Stressors: making progress toward outcome     Problem: Suicidal Behavior  Goal: Suicidal Behavior is Absent or Managed  Outcome: Ongoing, Progressing  Intervention: Provide Immediate and Ongoing Protective Physical Environment  Recent Flowsheet Documentation  Taken 5/8/2022 1856 by Tera Johnson RN  Safe Transition Promotion: protective factors promoted     Problem: Violence Risk or Actual  Goal: Anger and Impulse Control  Outcome: Ongoing, Progressing     Problem: Mood Impairment (Anxiety  Signs/Symptoms)  Goal: Improved Mood Symptoms (Anxiety Signs/Symptoms)  Outcome: Ongoing, Progressing   Goal Outcome Evaluation:    Plan of Care Reviewed With: patient     Overall Patient Progress: improving

## 2022-05-09 NOTE — PLAN OF CARE
Goal Outcome Evaluation:    Plan of Care Reviewed With: patient     Problem: Suicidal Behavior  Goal: Suicidal Behavior is Absent or Managed  Outcome: Ongoing, Progressing     Problem: Pain Acute  Goal: Acceptable Pain Control and Functional Ability  Intervention: Develop Pain Management Plan  Recent Flowsheet Documentation  Taken 5/9/2022 0820 by Charleen Norman RN  Pain Management Interventions: medication (see MAR)             Patient c/o lower back,and hip pain 10/10. Scheduled Naproxen administered with good effect. Denied all the psych symptoms. Visible on the unit. Calm and cooperative. Med compliant and contracted for safety. Prn Nicotine lozenge given this shift.

## 2022-05-09 NOTE — PLAN OF CARE
Assessment/Intervention/Current Symptoms and Care Coordination  Writer met with patient to discuss discharge. Patient stated that he had talked to his mother and was aware of placement. He expressed that he was ready to discharge but was tolerating his discomfort being hospitalized. Patient is aware of placement at [place].  Patient has been accepted for placement at a crisis residence with potential intake Wednesday 5/11. Patient will have clinical review on Tuesday 05/10 at 1 - 2pm    Discharge Plan or Goal   [facility] residence  [facility]) in [place]     Barriers to Discharge   Symptom stabilization, medication management care coordination      Referral Status Pending.   y [facility] residence on 5/09 requested signed documents from provider . Documents have been signed. [facility]) tentative discharge Wednesday 05/11.    On 05/02 Guardian stated that patient has a 30 day supply of medication at home and would only need to fill the olanzapine 10 mg as this was changed during this hospitalization.     Contacts:  Therapist:  Kulwant Hernandezx2/wk and Kathrynx1/wk. Location: [place].     Psychiatrist: Traci Crespo. Location: [place]. Date of last visit: 3/16/2022.     :  [place]. Location: [place] [facility] [phone].    Guardian Ryann [phone] and sister Traci Greenwood [phone].     Primary Care Provider: Kory Hudson MD. Location: unknown. Date of last visit: unknown. Frequency: unknown. Perceived helpfulness: helpful    [facility] crisis referrals .     Legal Status  voluntary      Cintia Moreno, [facility], [facility], 05/09/2022

## 2022-05-10 PROCEDURE — 250N000013 HC RX MED GY IP 250 OP 250 PS 637: Performed by: NURSE PRACTITIONER

## 2022-05-10 PROCEDURE — 128N000001 HC R&B CD/MH ADULT

## 2022-05-10 PROCEDURE — 999N000157 HC STATISTIC RCP TIME EA 10 MIN

## 2022-05-10 PROCEDURE — 250N000013 HC RX MED GY IP 250 OP 250 PS 637: Performed by: PSYCHIATRY & NEUROLOGY

## 2022-05-10 PROCEDURE — 99232 SBSQ HOSP IP/OBS MODERATE 35: CPT | Performed by: NURSE PRACTITIONER

## 2022-05-10 PROCEDURE — 250N000013 HC RX MED GY IP 250 OP 250 PS 637

## 2022-05-10 RX ADMIN — NICOTINE POLACRILEX 2 MG: 2 LOZENGE ORAL at 19:38

## 2022-05-10 RX ADMIN — DULOXETINE 120 MG: 60 CAPSULE, DELAYED RELEASE ORAL at 08:07

## 2022-05-10 RX ADMIN — OLANZAPINE 10 MG: 10 TABLET, ORALLY DISINTEGRATING ORAL at 20:56

## 2022-05-10 RX ADMIN — NICOTINE POLACRILEX 2 MG: 2 LOZENGE ORAL at 14:23

## 2022-05-10 RX ADMIN — PRAZOSIN HYDROCHLORIDE 3 MG: 1 CAPSULE ORAL at 20:56

## 2022-05-10 RX ADMIN — NAPROXEN SODIUM 440 MG: 220 TABLET, FILM COATED ORAL at 16:45

## 2022-05-10 RX ADMIN — PANTOPRAZOLE SODIUM 40 MG: 40 TABLET, DELAYED RELEASE ORAL at 07:07

## 2022-05-10 RX ADMIN — MIRABEGRON 50 MG: 25 TABLET, FILM COATED, EXTENDED RELEASE ORAL at 08:06

## 2022-05-10 RX ADMIN — CALCIUM POLYCARBOPHIL 1250 MG: 625 TABLET, FILM COATED ORAL at 08:07

## 2022-05-10 RX ADMIN — NICOTINE POLACRILEX 2 MG: 2 LOZENGE ORAL at 15:59

## 2022-05-10 RX ADMIN — CALCIUM POLYCARBOPHIL 1250 MG: 625 TABLET, FILM COATED ORAL at 21:00

## 2022-05-10 RX ADMIN — NICOTINE POLACRILEX 2 MG: 2 LOZENGE ORAL at 07:09

## 2022-05-10 RX ADMIN — Medication 50 MCG: at 08:06

## 2022-05-10 RX ADMIN — NICOTINE POLACRILEX 2 MG: 2 LOZENGE ORAL at 20:56

## 2022-05-10 RX ADMIN — OXYBUTYNIN CHLORIDE 30 MG: 10 TABLET, EXTENDED RELEASE ORAL at 08:06

## 2022-05-10 RX ADMIN — NICOTINE POLACRILEX 2 MG: 2 LOZENGE ORAL at 18:35

## 2022-05-10 RX ADMIN — HYDROCHLOROTHIAZIDE 25 MG: 25 TABLET ORAL at 08:06

## 2022-05-10 RX ADMIN — NICOTINE POLACRILEX 2 MG: 2 LOZENGE ORAL at 12:15

## 2022-05-10 RX ADMIN — LISINOPRIL 10 MG: 10 TABLET ORAL at 08:06

## 2022-05-10 RX ADMIN — NICOTINE POLACRILEX 2 MG: 2 LOZENGE ORAL at 08:07

## 2022-05-10 RX ADMIN — NAPROXEN SODIUM 440 MG: 220 TABLET, FILM COATED ORAL at 08:06

## 2022-05-10 ASSESSMENT — ACTIVITIES OF DAILY LIVING (ADL)
HYGIENE/GROOMING: INDEPENDENT
DRESS: INDEPENDENT
HYGIENE/GROOMING: INDEPENDENT
DRESS: INDEPENDENT
LAUNDRY: WITH SUPERVISION
ORAL_HYGIENE: INDEPENDENT
ORAL_HYGIENE: INDEPENDENT

## 2022-05-10 NOTE — PROGRESS NOTES
05/10/22 1549   Engagement   Intervention Group   Topic Detail [employer]ing Activity for concentration, attention to detail, coping, symptom mangement, healthy leisure, follow through, frustation tolerance, delayed gratification, building self esteem and an opportunity for socialization   Attendance Did not attend

## 2022-05-10 NOTE — DISCHARGE INSTRUCTIONS
Behavioral Discharge Planning and Instructions    Summary: You were admitted on 4/25/2022  due to Paranoia/Psychotic Symptomology.  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 Ongoing care to be coordinated at Crisis Residence with Guardian.    Therapists:  Kulwant Hernandezx2/wk and Kathrynx1/wk. DBT Location: [place].   Psychiatrist: Traci Crespo. Location: [place] .   Primary Care Provider: Kory Hudson MD. Location: unknown. Date of last visit: unknown. Frequency: unknown. Perceived helpfulness: helpful       Contacts  :  [place]. Location: [place] [phone].  Guardians Ryann [phone] and sister Traci Greenwood [phone].            Major Treatments, Procedures and Findings:  You were provided with: a psychiatric assessment, medication evaluation and/or management, group therapy, individual therapy, milieu management, and medical interventions    Symptoms to Report: feeling more aggressive, increased confusion, mood getting worse, or thoughts of suicide    Early warning signs can include: increased depression or anxiety sleep disturbances increased thoughts or behaviors of suicide or self-harm  increased unusual thinking, such as paranoia or hearing voices    Safety and Wellness:  Take all medicines as directed.  Make no changes unless your doctor suggests them.      Follow treatment recommendations.  Refrain from alcohol and non-prescribed drugs.  If there is a concern for safety, call 911.    Resources:   National [place] on Mental Illness (www.mn.jeniffer.org): 251.948.2049 or 359-267-0956.  National Suicide Prevention Line (www.mentalhealthmn.org): 632-690-UHSC (6926)  [facility] Crisis (COPE) Response - Adult 187 912-5614    General Medication  Instructions:   See your medication sheet(s) for instructions.   Take all medicines as directed.  Make no changes unless your doctor suggests them.   Go to all your doctor visits.  Be sure to have all your required lab tests. This way, your medicines can be refilled on time.  Do not use any drugs not prescribed by your doctor.  Avoid alcohol.    Advance Directives:   Scanned document on file with [place]? No scanned doc  Is document scanned? Pt states no documents  Honoring Choices Your Rights Handout: Informed and given  Was more information offered? Pt declined    The Treatment team has appreciated the opportunity to work with you. If you have any questions or concerns about your recent admission, you can contact the unit which can receive your call 24 hours a day, 7 days a week. They will be able to get in touch with a Provider if needed. The unit number is  .

## 2022-05-10 NOTE — PROGRESS NOTES
Pt was pleasant and engaged in group.  Pt needed a cue for turn taking in the beginning, but as group progressed he was able to recall the order in which people took their turn and was able to follow through without cueing.  Pt had mostly blunted affect, though he did brighten a couple of times during the course of the activity with success.       05/10/22 1104   Engagement   Intervention Group   Topic Detail Pt was present though lethargic during community meeting.  Pt declined to join OT group directly afterwards.  Pt had to be escorted to her room as she would not keep her eyes open.   Attendance Attended   Patient Response Demonstrated understanding of materials provided;Accepted feedback;Was respectful   Concentrated on Task duration of group   Cognition Goal-directed   Mood/Affect Pleasant   Social/Behavioral Engaged

## 2022-05-10 NOTE — PLAN OF CARE
Goal Outcome Evaluation:    Plan of Care Reviewed With: patient     Problem: Mood Impairment (Anxiety Signs/Symptoms)  Goal: Improved Mood Symptoms (Anxiety Signs/Symptoms)  Outcome: Ongoing, Progressing   Patient c/o lower back,and hip pain 8/10. Scheduled Naproxen administered with good effect. Denied all the psych symptoms. Visible in the milieu. Attended groups.  Calm and cooperative. Med compliant and contracted for safety. PRNS given Nicotine lozenge x2.

## 2022-05-10 NOTE — PLAN OF CARE
Assessment/Intervention/Current Symptoms and Care Coordination  Writer met with patient to discuss discharge. Patient is in agreement with discharge tomorrow. Mother will be providing transportation. Patient did not endorse SI/HI/SIB at this time. Patient continued to rest has he had done a lot of walking today and was tired.       Writer has faxed requested documentation to [facility]. Writer spoke to guardian (sister Traci). Traci stated that she will update her mother regarding potential discharge tomorrow and transportation. Sister stated that Guardians would like to arrange psychiatry post discharge to verify times and transportation with patient's new [facility] residence. Therapy is re-occurring and scheduled weekly. Patient will resume once discharged from hospital. Writer has left message for Guardian (mother) Dalia.    According to information gathered yesterday writer should be provided an update after 1 to 2 pm today.    Writer was updated by Blaine at [facility]. Blaine stated that he could not give a definite yes to intake. Nursing is reviewing patient needs for an injectable medication. Their nursing staff must be able to accommodate and be trained in order to give this injection. Blaine will provide writer an update in the morning.    Discharge Plan or Goal   [facility]     Barriers to Discharge   Symptom stabilization, medication management care coordination      Referral Status Pending.    [facility] on 5/09 requested signed documents from provider . Documents have been signed. [facility]) tentative discharge Wednesday 05/11.    On 05/02 Guardian stated that patient has a 30 day supply of medication at home and would only need to fill the olanzapine 10 mg as this was changed during this hospitalization.     Contacts:  Antonino  [facility]      Therapist:  Kulwant  Davidx2/wk and Kathrynx1/wk. DBT Therapist Location: [place]. Wednesday and every other week.    Psychiatrist: Traci Crespo. Location: [place] .     :  [facility]. Location: [place] [phone].    Guardifigueroa Gómez [phone] and sister Traci Greenwood [phone].     Primary Care Provider: Kory Hudson MD. Location: unknown. Date of last visit: unknown. Frequency: unknown. Perceived helpfulness: helpful    Mehul HUTTON [facility] crisis referrals .     Legal Status  voluntary      Cintia Moreno, [facility], [facility], 05/09/2022

## 2022-05-10 NOTE — PLAN OF CARE
Goal Outcome Evaluation:    Problem: Sleep Disturbance  Goal: Adequate Sleep/Rest  Outcome: Ongoing, Progressing  Pt slept most of shift, up for snack x 1 and bathroom x 2, found pt sleeping without CPAP mask on x 2, pt needs remind to keep mask on white sleeping, pt slept 6 hours this shift, Q 15 minutes check in place, will continue to monitor.

## 2022-05-10 NOTE — PLAN OF CARE
Goal Outcome Evaluation:    Plan of Care Reviewed With: patient        Problem: Suicidal Behavior  Goal: Suicidal Behavior is Absent or Managed  5/9/2022 2137 by Charleen Norman, RN  Outcome: Ongoing, Progressing  5/9/2022 1509 by Charleen Norman, RN  Outcome: Ongoing, Progressing     Problem: Pain Acute  Goal: Acceptable Pain Control and Functional Ability  Outcome: Ongoing, Progressing  Intervention: Develop Pain Management Plan  Recent Flowsheet Documentation  Taken 5/9/2022 0820 by Charleen Norman, RN  Pain Management Interventions: medication (see MAR)    Patient c/o lower back,and hip pain 8/10. Scheduled Naproxen administered with good effect. Denied all the psych symptoms. Visible in the milieu socializing with peers. Calm and cooperative. Med compliant and contracted for safety.

## 2022-05-10 NOTE — PROGRESS NOTES
"PSYCHIATRY  PROGRESS NOTE     DATE OF SERVICE   5/10/2022         CHIEF COMPLAINT   \" remain positive\"       SUBJECTIVE   Nursing reports:  Patient was calm and cooperative. He spent time between his room and the lounge, minimally engaged with peers. He complained of back pain and took PRN pain meds with good relief. He denies all psych symptoms.      reports: I have reviewed patient with  earlier today and the plan remains for patient to discharge to [facility] tomorrow, Wednesday 5/11         OBJECTIVE   Chart reviewed and patient assessed. Patient was seen and evaluated in his room by himself. Upon patient interview, patient states he is somewhat disappointed that he is not able to discharge to his mother today, saying he would have preferred to spend the night with his mother before heading to [facility] tomorrow. Patient does state he remains positive about discharging to the [facility] tomorrow. Patient continues to exhibit improvement in thinking, mood, and perceptions. He denies both suicidal and homicidal ideation. He denies both auditory and visual hallucinations. No paranoid ideation observed during this assessment. Patient's tentative discharge to [facility] is tomorrow.      MEDICATIONS   Medications:  Scheduled Meds:    calcium polycarbophil  1,250 mg Oral BID     DULoxetine  120 mg Oral Daily     hydrochlorothiazide  25 mg Oral Daily     liraglutide - Weight Management  3 mg Subcutaneous Daily     lisinopril  10 mg Oral QAM     mirabegron  50 mg Oral Daily     naproxen sodium  440 mg Oral BID w/meals     OLANZapine zydis  10 mg Oral At Bedtime     oxybutynin ER  30 mg Oral Daily     pantoprazole  40 mg Oral QAM AC     prazosin  3 mg Oral At Bedtime     vitamin D3  50 mcg Oral Daily     Continuous Infusions:  PRN Meds:.acetaminophen, albuterol, alum & mag hydroxide-simethicone, hydrOXYzine, mineral oil-hydrophilic petrolatum, nicotine, OLANZapine " "**OR** OLANZapine, senna-docusate, traZODone    Medication adherence issues: MS Med Adherence Y/N: No  Medication side effects: MEDICATION SIDE EFFECTS: no side effects reported  Benefit: Yes / No: Yes       ROS   A 10-point review of systems was completed and is otherwise negative with the exception of HPI.       MENTAL STATUS EXAM   Vitals: /59   Pulse 84   Temp 98.3  F (36.8  C)   Resp 20   Ht 1.83 m (6' 0.05\")   Wt (!) 167.2 kg (368 lb 9.6 oz)   SpO2 95%   BMI 49.93 kg/m      Appearance:  Casually groomed  Mood:  {Mood: Anxious  and Euthymic  Affect: full range  was congruent to speech  Suicidal Ideation: PRESENT / ABSENT: absent   Homicidal Ideation: PRESENT / ABSENT: absent   Thought process: concrete   Thought content: denies suicidal and homicidal ideations. Denies paranoid ideation and delusion   Fund of Knowledge: Below average  Attention/Concentration: Fair  Language ability:  Intact  Memory:  Immediate recall intact, Short-term memory intact and Long-term memory intact  Insight:  limited.  Judgement: limited  Orientation: Yes, x4  Psychomotor Behavior: denies tardive dyskinesia, dystonia or tics  Muscle Strength and Tone: MuscleStrength: Normal  Gait and Station: Normal       LABS   personally reviewed.     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)    Active Problem List:  Patient Active Problem List   Diagnosis     Speech/language delay     Tobacco use disorder     Nocturnal enuresis     Moderate major depression (H)     LOREN (obstructive sleep apnea)     Essential hypertension     Morbid obesity (H)     Leukocytosis     Suicidal ideation     Chronic low back pain     Bilateral low back pain with left-sided sciatica     History of colonic polyps     Mild intellectual disability     S/P total hip arthroplasty     Vitamin D deficiency     LPRD (laryngopharyngeal " reflux disease)     Patel's esophagus determined by biopsy     Mild intermittent asthma     Somatic dysfunction of lumbar region     Somatic dysfunction of sacral region     Sacral pain     Thoracic segment dysfunction     Hip pain, left     Duodenitis     Overactive bladder     Venous stasis dermatitis of both lower extremities     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 Ongoing education given regarding diagnostic and treatment options with risks, benefits and alternatives and adequate verbalization of understanding.  2. Medications: Cymbalta 120 mg                           Zyprexa 10 mg starting 4/27/22  3) Hospitalist consult: Hospitalist to see patient as needed  4) Legal: Voluntary  5)  to follow regarding collecting and reviewing collateral information, referrals and disposition planning     Risk Assessment: [facility] RISK ASSESSMENT: Patient able to contract for safety    Coordination of Care:   Treatment Plan reviewed and physician signed, Care discussed with Care/Treatment Team Members, Chart reviewed and Patient seen      Re-Certification I certify that the inpatient psychiatric facility services furnished since the previous certification were, and continue to be, medically necessary for, either, treatment which could reasonably be expected to improve the patient s condition or diagnostic study and that the hospital records indicate that the services furnished were, either, intensive treatment services, admission and related services necessary for diagnostic study, or equivalent services.     I certify that the patient continues to need, on a daily basis, active treatment furnished directly by or requiring the supervision of inpatient psychiatric facility personnel.   I estimate 5-7 days of hospitalization is necessary for proper treatment of the  patient. My plans for post-hospital care for this patient are  adult foster care     LAMBERT Zhu CNP    -     5/10/2022     -     5:58 PM    Total time  25 minutes with > 50%spent on coordination of cares and psycho-education.    This note was created with help of Dragon dictation system. Grammatical / typing errors are not intentional.    LAMBERT Zhu CNP

## 2022-05-10 NOTE — PLAN OF CARE
"    Pt is on his home cpap with suraj DIGGS well. bs clear/diminish. Blood pressure (!) 147/71, pulse 99, temperature 98.3  F (36.8  C), temperature source Oral, resp. rate 18, height 1.83 m (6' 0.05\"), weight (!) 167 kg (368 lb 2.7 oz), SpO2 92 %.    Plan: keep sat >/=90% days and home cpap at night              "

## 2022-05-11 VITALS
WEIGHT: 315 LBS | SYSTOLIC BLOOD PRESSURE: 118 MMHG | DIASTOLIC BLOOD PRESSURE: 60 MMHG | RESPIRATION RATE: 18 BRPM | BODY MASS INDEX: 42.66 KG/M2 | TEMPERATURE: 97.3 F | OXYGEN SATURATION: 95 % | HEART RATE: 82 BPM | HEIGHT: 72 IN

## 2022-05-11 LAB — SARS-COV-2 RNA RESP QL NAA+PROBE: NEGATIVE

## 2022-05-11 PROCEDURE — 99239 HOSP IP/OBS DSCHRG MGMT >30: CPT | Performed by: NURSE PRACTITIONER

## 2022-05-11 PROCEDURE — 250N000013 HC RX MED GY IP 250 OP 250 PS 637

## 2022-05-11 PROCEDURE — 250N000013 HC RX MED GY IP 250 OP 250 PS 637: Performed by: PSYCHIATRY & NEUROLOGY

## 2022-05-11 PROCEDURE — 99231 SBSQ HOSP IP/OBS SF/LOW 25: CPT

## 2022-05-11 PROCEDURE — 250N000013 HC RX MED GY IP 250 OP 250 PS 637: Performed by: NURSE PRACTITIONER

## 2022-05-11 PROCEDURE — 87635 SARS-COV-2 COVID-19 AMP PRB: CPT | Performed by: NURSE PRACTITIONER

## 2022-05-11 RX ORDER — IBUPROFEN 200 MG
400 TABLET ORAL
Status: COMPLETED | OUTPATIENT
Start: 2022-05-11 | End: 2022-05-11

## 2022-05-11 RX ORDER — LIRAGLUTIDE 6 MG/ML
INJECTION, SOLUTION SUBCUTANEOUS
Qty: 27 ML | Refills: 1
Start: 2022-05-11 | End: 2022-09-19 | Stop reason: DRUGHIGH

## 2022-05-11 RX ORDER — ACETAMINOPHEN 325 MG/1
650 TABLET ORAL EVERY 4 HOURS PRN
COMMUNITY
Start: 2022-05-11 | End: 2022-07-21

## 2022-05-11 RX ORDER — OLANZAPINE 10 MG/1
10 TABLET ORAL AT BEDTIME
Qty: 30 TABLET | Refills: 0 | Status: SHIPPED | OUTPATIENT
Start: 2022-05-11 | End: 2022-10-06

## 2022-05-11 RX ADMIN — OXYBUTYNIN CHLORIDE 30 MG: 10 TABLET, EXTENDED RELEASE ORAL at 08:00

## 2022-05-11 RX ADMIN — CALCIUM POLYCARBOPHIL 1250 MG: 625 TABLET, FILM COATED ORAL at 08:00

## 2022-05-11 RX ADMIN — LISINOPRIL 10 MG: 10 TABLET ORAL at 08:00

## 2022-05-11 RX ADMIN — NAPROXEN SODIUM 440 MG: 220 TABLET, FILM COATED ORAL at 08:00

## 2022-05-11 RX ADMIN — IBUPROFEN 400 MG: 200 TABLET, FILM COATED ORAL at 10:35

## 2022-05-11 RX ADMIN — NICOTINE POLACRILEX 2 MG: 2 LOZENGE ORAL at 09:42

## 2022-05-11 RX ADMIN — DULOXETINE 120 MG: 60 CAPSULE, DELAYED RELEASE ORAL at 08:00

## 2022-05-11 RX ADMIN — Medication 50 MCG: at 08:00

## 2022-05-11 RX ADMIN — MIRABEGRON 50 MG: 25 TABLET, FILM COATED, EXTENDED RELEASE ORAL at 08:00

## 2022-05-11 RX ADMIN — HYDROCHLOROTHIAZIDE 25 MG: 25 TABLET ORAL at 08:00

## 2022-05-11 RX ADMIN — PANTOPRAZOLE SODIUM 40 MG: 40 TABLET, DELAYED RELEASE ORAL at 08:00

## 2022-05-11 RX ADMIN — NICOTINE POLACRILEX 2 MG: 2 LOZENGE ORAL at 07:59

## 2022-05-11 ASSESSMENT — ACTIVITIES OF DAILY LIVING (ADL)
DRESS: INDEPENDENT
ORAL_HYGIENE: INDEPENDENT
LAUNDRY: WITH SUPERVISION

## 2022-05-11 NOTE — PROGRESS NOTES
[facility]    Medicine Progress Note - Hospitalist Service    Date of Admission:  4/25/2022    Assessment & Plan          Kimo Greenwood is a 41 year old male admitted on 4/25/2022. He has a PMH of polysubstance abuse (alcohol, tobacco, and cannabis), anxiety, PTSD, major depressive disorder, suicidal ideation, mild intellectual disability, leukocytosis, asthma, and hypertension. He presented to the ED 4/24 for evaluation of worsening paranoia.      Discharge Med rec complete for medications listed below.       # LOREN  History of sleep apnea. Using CPAP machine at night. States he is sleeping well.      # Overactive Bladder  - mirabegron 24 hour tablet, 50 mg daily    - oxybutynin ER 30 mg daily      # Asthma  History of mild intermittent asthma. No recent exacerbations.  - PTA albuterol inhaler, 2 puffs every 6 hours prn     # Obesity  Morbidly obese with a BMI of 50.   - A1C 5.1 last august, normal blood sugar  - no history of high cholesterol  - encourage to exercise, follow healthy diet, encourage weight loss  - PTA Vitamin D3 50 mcg daily  - liraglutide 3 mg subcutaneous daily      # Hypertension  Normotensive. States he is compliant with medication.   - PTA hydrochlorothiazide 25 mg daily  - PTA lisinopril 10 mg daily  - EKG - NSR without signs of left ventricular hypertrophy, ectopy, ischemia, ST or T waves changes, or prolonged QT  - denies headache, blurry vision, chest pain, palpitations  - follow a low salt or DASH diet, increase fruits, vegetables, whole grains, maintain a healthy weight, exercise at least 30 minutes per day most days of the week     # Chronic Low Back Pain  Denies pain.   - PTA naproxen 440 mg BID with meals  - protonix 40 mg daily   - tylenol prn      # Tobacco addiction- nicorette lozenges prn      # Polysubstance Abuse  # MDD, Anxiety, PTSD  # Suicidal Ideation- Attempts by overdose 2005, 2015, 2018  - manage per psychiatry            Diet: Regular Diet  Adult    DVT Prophylaxis: Low Risk/Ambulatory with no VTE prophylaxis indicated  Germain Catheter: Not present  Central Lines: None  Cardiac Monitoring: None  Code Status: Full Code      Disposition Plan   Expected Discharge: 05/11/2022     Anticipated discharge location:  Awaiting care coordination huddle  Delays:  none        The patient's care was discussed with the Bedside Nurse and Patient.    LAMBERT Shay [facility]  Hospitalist Service  [facility]  Securely message with the Vocera Web Console (learn more here)  Text page via [employer] Paging/Directory         Clinically Significant Risk Factors Present on Admission                    ______________________________________________________________________    Interval History   12 point review of systems negative except for pertinent positives mentioned in the HPI and Assessment and Plan.    Data reviewed today: I reviewed all medications, new labs and imaging results over the last 24 hours. I personally reviewed no images or EKG's today.    Physical Exam   Vital Signs: Temp: 97.6  F (36.4  C) Temp src: Oral BP: 118/60 Pulse: 82   Resp: 18 SpO2: 97 % O2 Device: BiPAP/CPAP    Weight: 368 lbs 9.6 oz  Constitutional: awake, alert, cooperative, no apparent distress, and appears stated age  Neuropsychiatric: General: normal, calm and normal eye contact    Data   No lab results found in last 7 days.

## 2022-05-11 NOTE — DISCHARGE SUMMARY
PSYCHIATRY  DISCHARGE SUMMARY     DATE OF DISCHARGE   05/11/2022           DISCHARGE DIAGNOSIS   Severe recurrent major depressive disorder with psychotic features (H)    Patient Active Problem List   Diagnosis     Speech/language delay     Tobacco use disorder     Nocturnal enuresis     Moderate major depression (H)     LOREN (obstructive sleep apnea)     Essential hypertension     Morbid obesity (H)     Leukocytosis     Suicidal ideation     Chronic low back pain     Bilateral low back pain with left-sided sciatica     History of colonic polyps     Mild intellectual disability     S/P total hip arthroplasty     Vitamin D deficiency     LPRD (laryngopharyngeal reflux disease)     Patel's esophagus determined by biopsy     Mild intermittent asthma     Somatic dysfunction of lumbar region     Somatic dysfunction of sacral region     Sacral pain     Thoracic segment dysfunction     Hip pain, left     Duodenitis     Overactive bladder     Venous stasis dermatitis of both lower extremities     Severe recurrent major depressive disorder with psychotic features (H)     Alcohol use disorder, mild, abuse     Cannabis use disorder, mild, abuse     Mood change          REASON FOR ADMISSION   This is a 41 year old male with the history of Major depressive Depressive Disorder,ANDRES, Mild Developmental Disorder, and Adjustment Disorder with Anxiety who presented to the ED due to concerns related to increased paranoia and delusions. Current legal status is Voluntary. Patient's mother is his legal guardian. Reportedly, patient has been calling 911 due to increased paranoia and delusion. Patient reportedly agitated and difficult to redirect when limits are being set regarding constant 911 calls while at the adult foster care. Paranoid about other people talking about his illness as he believes he is terminally ill. Patient reportedly visited ED x4 for various somatic complaint related to paranoia in the last several  "weeks.     Patient was seen and evaluated in his room by himself. Patient presented as tired, depressed, anxious with paranoid ideations. He continued to be somatically directed. He continued to have the belief that he is terminally ill. When writer asked patient why he thinks he is terminally ill, he justified his concerns by stating \"my BP was seriously low when they checked it this morning\". Of note patient's BP was WNL at 124/65 this morning. Patient denies both suicidal and homicidal ideations. Patient also denies both auditory and visual hallucinations. Patient stated has bas been staying with his mother for the past few days because he was becoming too difficult to manage at the adult foster care. Per chart review, patient does have delusion and paranoid that are manageable with medication at baseline. He decompensated three months ago when his OP psychiatrist decreased his Zyprexa due to complaint of weight gain. Patient has since back to his therapeutic dose with not much improvement in symptoms. I plan to titrate Zyprexa to10 mg at bedtime targeting psychosis starting 4/27/22. Patient agreeable with the plan.      Patient is morbidly obese. He uses CPAP for sleep APNEA during the night. Consult placed to hospitalist to complete PTA med rec and assess patient's physical problems.         HOSPITAL COURSE   Admitted due to aforementioned presentation.  Education regarding diagnostic and treatment options with risks, benefits and alternatives and adequate verbalization of understanding.  Discussed reviewed in further detail, stressors and events leading to presentation.    Upon arrival,the multidisciplinary treatment team met (RN, OT, , Physician) on a daily basis to discuss patient care and treatment planning. The psychiatric inpatient setting provided close nursing supervision and access to multiple treatment modalities and programming (group therapy, OT, one-to-one therapy.) Patient support " systems such as family, , and other care providers were contacted as appropriate for collateral information and treatment planning.     Patient was maintained on his PTA medications which include Cymbalta 120 mg at bedtime, Zyprexa 7.5 mg at bedtime and Prazosin 3 mg at bedtime with good tolerability. Zyprexa titrated to 10 mg at bedtime targeting increased delusional thinking and irritability with good efficacy as patient started exhibiting improvement in symptoms. Patient began to exhibit improvements in thinking, mood, and perception. He denied any perceptual disturbances. He denied suicidal ideation throughout his stay. Patient no longer believe he is terminally ill. Sleep improved and stabilized. Appetite at baseline.   Patient was followed by the medical team relating to physical problems including chronic back pain, high blood pressure, over reactive bladder, Asthma and Sleep APNEA. Patient was medically stabilized before he discharges.     Patient was pleasant and behaviorally appropriate throughout his stay. He attended unit programming intermittently. At the time of discharge, there appeared to be no evidence that the patient posed an imminent threat to self or others and a reasonable discharge plan was in place, we determined that the patient had achieved optimal medical benefit from hospitalization and was appropriate for outpatient level of care. Patient discharged to [facility] in [place].     .................................................... 's discharge summary...............................................  Assessment/Intervention/Current Symptoms and Care Coordination  Writer consulted with psychiatrist and met with patient to discuss discharge. Writer reviewed medication with [facility]. Staff will be able to administer injectable medication. [facility] are aware that guardian has a 30 day supply of medications and that olanzapine 10 mg  "will be filled here.  On 05/02 Guardian stated that patient has a 30 day supply of medication at home and would only need to fill the olanzapine 10 mg as this was changed during this hospitalization.      Patient was smiling and expressed that he is ready to discharge. Patient was able to recognize his efforts in coping with hospitalization. He expressed no other concerns at this time. Patient does not endorse SI/HI/SIB.     Discharge Plan or Goal   [facility] residence  [facility]) in [place]      Barriers to Discharge   Symptom stabilization, medication management care coordination        Referral Status Completed    [facility] residence( [facility]) in [place] discharge Wednesday 05/11.        Contacts:  Antonino  [facility] residence [facility]  Therapist:  Kulwant Hernandezx2/wk and Kathrynx1/wk. DBT Therapist Location: [place]. Wednesday and every other week.  Psychiatrist: Traci Crespo. Location: [place] .   :  [place]. Location: [place] [phone].  Guardian Dalia [phone] and sister Traci Greenwood [phone].  Primary Care Provider: Kory Hudson MD. Location: unknown. Date of last visit: unknown. Frequency: unknown. Perceived helpfulness: helpful     Mehul HUTTON [facility] crisis referrals .      Legal Status  voluntary     MENTAL STATUS EXAM   Vitals: /60   Pulse 82   Temp 97.3  F (36.3  C) (Axillary)   Resp 18   Ht 1.83 m (6' 0.05\")   Wt (!) 167.2 kg (368 lb 9.6 oz)   SpO2 95%   BMI 49.93 kg/m      Mental Status:  Appearance:  Neatly groomed  Mood:  \"Happy to go to treatment\"  Affect: full range was was congruent to speech Mood congruent, intensity is normal and reactive  Suicidal Ideation: PRESENT / ABSENT: absent   Homicidal Ideation: PRESENT / ABSENT: absent   Thought process: logical and coherent   Thought content: denies suicidal ideation, violent ideation, delusions, magical thinking and paranoid ideation.   Fund of " Knowledge: Average  Attention/Concentration: Fair  Language ability:  Intact  Memory:  Immediate recall intact, Short-term memory intact and Long-term memory intact  Insight:  fair.  Judgement: fair  Orientation: Yes, x4  Psychomotor Behavior: denies tardive dyskinesia, dystonia or tics  Muscle Strength and Tone: MuscleStrength: Normal  Gait and Station: Normal         DISCHARGE MEDICATIONS   Discharge Medication Options:   Discharge Medication List as of 5/11/2022 10:46 AM      START taking these medications    Details   acetaminophen (TYLENOL) 325 MG tablet Take 2 tablets (650 mg) by mouth every 4 hours as needed for mild pain (to moderate pain), OTC         CONTINUE these medications which have CHANGED    Details   !! liraglutide - Weight Management (SAXENDA) 18 MG/3ML pen Inject 0.6 mg Subcutaneous daily for 7 days, THEN 1.2 mg daily for 7 days, THEN 1.8 mg daily for 7 days, THEN 2.4 mg daily for 7 days, THEN 3 mg daily., Disp-27 mL, R-1, No Print Out      OLANZapine (ZYPREXA) 10 MG tablet Take 1 tablet (10 mg) by mouth At Bedtime, Disp-30 tablet, R-0, E-Prescribe       !! - Potential duplicate medications found. Please discuss with provider.      CONTINUE these medications which have NOT CHANGED    Details   albuterol (VENTOLIN HFA) 108 (90 Base) MCG/ACT inhaler INHALE 2 PUFFS INTO LUNGS EVERY SIX HOURS AS NEEDED FOR SHORTNESS OF BREATH / DYSPNEA OR WHEEZING, Disp-18 g, R-1, E-PrescribePharmacy may dispense brand covered by insurance (Proair, or proventil or ventolin or generic albuterol inhaler)      DULoxetine (CYMBALTA) 60 MG capsule Take 120 mg by mouth daily , Historical      FIBER- MG tablet TAKE 2 TABLETS BY MOUTH TWICE A DAY, Disp-360 tablet, R-3, E-PrescribeAuthorized Quantity Exceeded      hydrochlorothiazide (HYDRODIURIL) 25 MG tablet Take 1 tablet (25 mg) by mouth daily, Disp-90 tablet, R-3, E-Prescribe      Lidocaine (LIDOCARE) 4 % Patch Place 1-2 patches onto the skin every 24 hours To  prevent lidocaine toxicity, patient should be patch free for 12 hrs daily.Disp-60 patch, [id number]      !! liraglutide - Weight Management (SAXENDA) 18 MG/3ML pen Inject 3 mg subcutaneously daily., Disp-15 mL, R-3, E-PrescribeAuthorized Quantity Exceeded      lisinopril (ZESTRIL) 10 MG tablet TAKE 1 TABLET BY MOUTH EVERY MORNING, Disp-28 tablet, R-3, E-PrescribeAuthorized Quantity Exceeded      mirabegron (MYRBETRIQ) 50 MG 24 hr tablet Take 1 tablet by mouth daily, Historical      naproxen sodium 220 MG capsule If Tylenol is not helpful, this can be added 2 tabs twice a day with meals., Disp-60 capsule, R-3, E-Prescribe      order for DME Equipment being ordered: CPAP suppliesDisp-1 each,R-0, Local Print      oxybutynin ER (DITROPAN XL) 15 MG 24 hr tablet Take 30 mg by mouth daily, Historical      pantoprazole (PROTONIX) 40 MG EC tablet TAKE 1 TABLET BY MOUTH 30-60 MINUTES BEFORE FIRST MEAL OF THE DAY, Disp-28 tablet, R-3, E-PrescribeAuthorized Quantity Exceeded      prazosin (MINIPRESS) 1 MG capsule Take 3 mg by mouth At Bedtime, Historical      VITAMIN D3 50 MCG (2000 UT) tablet TAKE ONE TABLET BY MOUTH EVERY DAY, Disp-90 tablet, R-3, E-Prescribe       !! - Potential duplicate medications found. Please discuss with provider.          Medication adherence issues: MS Med Adherence Y/N: No  Medication side effects: MEDICATION SIDE EFFECTS: no side effects reported         DISCHARGE PLAN   1.  Education given regarding diagnostic and treatment options with risks, benefits and alternatives with adequate verbalization of understanding.  2.  Discharge to [facility] in [place].  Upon detailed review of risk factors, patient amenable for release.   3.  Continue aforementioned medications and associated medication changes with follow-up by outpatient mental health provider.  4.  Crisis management planning in place.    5.  Continue efforts for sobriety.  6. Nursing and  to review further discharge  recommendations.     TOTAL TIME:  Greater than 30 minutes for discharge planning.    This note was created with help of Dragon dictation system. Grammatical / typing errors are not intentional.    LAMBERT Zhu CNP

## 2022-05-11 NOTE — PLAN OF CARE
Problem: Relapse Prevention  Goal: Engage in OT Group  Description: Engage in 1 OT group activity this review period to support recovery and encourage engagement in meaningful, goal-directed activity.   Outcome: Met     Occupational Therapy Discharge Note    Patient Name:  Kimo Greenwood    D: Refer to daily doc flowsheets for details of progress toward goals.  A: Improvement seen in disposition and symptom management.   P: Patient discharging to [facility] in [place] . Discontinue OT Care Plan goals and interventions.    Deepali Mcbride OT  Date: 5/11/2022  Time: 11:20 AM

## 2022-05-11 NOTE — PLAN OF CARE
Problem: Behavioral Health Plan of Care  Goal: Plan of Care Review  Outcome: Ongoing, Progressing  Flowsheets (Taken 5/10/2022 1700)  Plan of Care Reviewed With: patient  Patient Agreement with Plan of Care: agrees     Problem: Noninvasive Ventilation Acute  Goal: Effective Unassisted Ventilation and Oxygenation  Outcome: Ongoing, Progressing     Problem: Sleep Disturbance  Goal: Adequate Sleep/Rest  Outcome: Ongoing, Progressing     Problem: Suicidal Behavior  Goal: Suicidal Behavior is Absent or Managed  Outcome: Ongoing, Progressing     Problem: Violence Risk or Actual  Goal: Anger and Impulse Control  Outcome: Ongoing, Progressing     Problem: Activity and Energy Impairment (Anxiety Signs/Symptoms)  Goal: Optimized Energy Level (Anxiety Signs/Symptoms)  Outcome: Ongoing, Progressing  Intervention: Optimize Energy Level  Recent Flowsheet Documentation  Taken 5/10/2022 1700 by Teddy Doran RN  Activity (Behavioral Health): activity encouraged     Problem: Cognitive Impairment (Anxiety Signs/Symptoms)  Goal: Optimized Cognitive Function (Anxiety Signs/Symptoms)  Outcome: Ongoing, Progressing   Goal Outcome Evaluation:    Plan of Care Reviewed With: patient      Pt denies all psych symptoms and contracts for safety. Up and visible on the unit the majority of the shift engaging with peers. Pt., c/o lower back, hips, and knees pain and rated at 10/10 respectively. Scheduled Naproxen administered with some relief. Pt is pleasant on approach. Affect is flat, mood is calm. He is cooperative and medication compliant. No abnormal behaviors noted.

## 2022-05-11 NOTE — PROGRESS NOTES
05/11/22 0915   Engagement   Intervention Group   Topic Detail OT: Education on physical wellness and cognitive wellness with interactive social activities (Exercise Dice [employer] Emerson Name It) to increase concentration, focus, attention to task/detail, memory recall, coping with stress, healthy distraction engagement, social wellness, physical wellness, and cognitive wellness   Attendance Attended   Patient Response Demonstrated understanding of materials provided;Was dismissive   Concentrated on Task 10 - 15 min   Cognition Goal-directed;Follows through with task   Mood/Affect Content   Social/Behavioral Cooperative   Thought Content [place]     Pt sat among peers to complete presented activity and engaged minimally in social interactions with peers. Pt pulled from group by provider and returned. Pt intermittently engaged in physical movement activities and attempted all cognitive wellness activities. Pt progressing towards goal.

## 2022-05-11 NOTE — PLAN OF CARE
Problem: Behavioral Health Plan of Care  Goal: Plan of Care Review  5/11/2022 0646 by Teddy Doran RN  Outcome: Ongoing, Progressing  5/10/2022 2308 by Teddy Doran RN  Outcome: Ongoing, Progressing  Flowsheets (Taken 5/10/2022 1700)  Plan of Care Reviewed With: patient  Patient Agreement with Plan of Care: agrees     Problem: Noninvasive Ventilation Acute  Goal: Effective Unassisted Ventilation and Oxygenation  5/11/2022 0646 by Teddy Doran RN  Outcome: Ongoing, Progressing  5/10/2022 2308 by Teddy Doran RN  Outcome: Ongoing, Progressing     Problem: Sleep Disturbance  Goal: Adequate Sleep/Rest  5/11/2022 0646 by Teddy Doran RN  Outcome: Ongoing, Progressing  5/10/2022 2308 by Teddy Doran RN  Outcome: Ongoing, Progressing     Problem: Suicidal Behavior  Goal: Suicidal Behavior is Absent or Managed  5/11/2022 0646 by Teddy Doran RN  Outcome: Ongoing, Progressing  5/10/2022 2308 by Teddy Doran RN  Outcome: Ongoing, Progressing     Problem: Violence Risk or Actual  Goal: Anger and Impulse Control  5/11/2022 0646 by Teddy Doran RN  Outcome: Ongoing, Progressing  5/10/2022 2308 by Teddy Doran RN  Outcome: Ongoing, Progressing     Problem: Activity and Energy Impairment (Anxiety Signs/Symptoms)  Goal: Optimized Energy Level (Anxiety Signs/Symptoms)  Outcome: Ongoing, Progressing  Intervention: Optimize Energy Level  Recent Flowsheet Documentation  Taken 5/10/2022 1700 by Teddy Doran RN  Activity (Behavioral Health): activity encouraged     Problem: Cognitive Impairment (Anxiety Signs/Symptoms)  Goal: Optimized Cognitive Function (Anxiety Signs/Symptoms)  5/11/2022 0646 by Teddy Doran RN  Outcome: Ongoing, Progressing  5/10/2022 2308 by Teddy Doran RN  Outcome: Ongoing, Progressing   Goal Outcome Evaluation:    Plan of Care Reviewed With: patient      Up twice for snacks otherwise pt appeared to be sleeping comfortably during all safety checks. No signs of pain, discomfort, or abnormal behavior noted.  Will  continue with same plan of care.

## 2022-05-11 NOTE — PLAN OF CARE
Problem: Behavioral Health Plan of Care  Goal: Plan of Care Review  Outcome: Met  Flowsheets (Taken 5/11/2022 0900)  Plan of Care Reviewed With: patient  Patient Agreement with Plan of Care: agrees  Goal: Patient-Specific Goal (Individualization)  Outcome: Met  Goal: Adheres to Safety Considerations for Self and Others  Outcome: Met  Goal: Absence of New-Onset Illness or Injury  Outcome: Met  Goal: Optimal Comfort and Wellbeing  Outcome: Met  Goal: Optimized Coping Skills in Response to Life Stressors  Outcome: Met  Goal: Develops/Participates in Therapeutic [place] to Support Successful Transition  Outcome: Met  Goal: Team Discussion  Outcome: Met     Problem: Noninvasive Ventilation Acute  Goal: Effective Unassisted Ventilation and Oxygenation  Outcome: Met     Problem: Sleep Disturbance  Goal: Adequate Sleep/Rest  Outcome: Met     Problem: Suicidal Behavior  Goal: Suicidal Behavior is Absent or Managed  Outcome: Met     Problem: Violence Risk or Actual  Goal: Anger and Impulse Control  Outcome: Met     Problem: Pain Acute  Goal: Acceptable Pain Control and Functional Ability  Outcome: Met     Problem: Activity and Energy Impairment (Anxiety Signs/Symptoms)  Goal: Optimized Energy Level (Anxiety Signs/Symptoms)  Outcome: Met     Problem: Cognitive Impairment (Anxiety Signs/Symptoms)  Goal: Optimized Cognitive Function (Anxiety Signs/Symptoms)  Outcome: Met     Problem: Mood Impairment (Anxiety Signs/Symptoms)  Goal: Improved Mood Symptoms (Anxiety Signs/Symptoms)  Outcome: Met     Problem: Sleep Impairment (Anxiety Signs/Symptoms)  Goal: Improved Sleep (Anxiety Signs/Symptoms)  Outcome: Met     Problem: Social, Occupational or Functional Impairment (Anxiety Signs/Symptoms)  Goal: Enhanced Social, Occupational or Functional Skills (Anxiety Signs/Symptoms)  Outcome: Met     Problem: Somatic Disturbance (Anxiety Signs/Symptoms)  Goal: Improved Somatic Symptoms (Anxiety Signs/Symptoms)  Outcome: Met   Goal Outcome  Evaluation:    Plan of Care Reviewed With: patient      Patient discharged at 1100 to [facility]) in [place].Patient was accompanied and transported by mother. Patient discharge teaching done with patient, questions answered. Medication refill sent to [place] pharmacy.    Hannah Obando RN

## 2022-05-11 NOTE — PLAN OF CARE
"    Pt is on his home cpap with suraj DIGGS well. bs clear/diminish. Blood pressure 118/60, pulse 82, temperature 97.6  F (36.4  C), temperature source Oral, resp. rate 18, height 1.83 m (6' 0.05\"), weight (!) 167.2 kg (368 lb 9.6 oz), SpO2 97 %.      Plan: keep sat >/=90% days and home cpap at night              "

## 2022-05-11 NOTE — PLAN OF CARE
Assessment/Intervention/Current Symptoms and Care Coordination  Writer consulted with psychiatrist and met with patient to discuss discharge. Writer reviewed medication with [facility]. Staff will be able to administer injectable medication. [facility] are aware that guardian has a 30 day supply of medications and that olanzapine 10 mg will be filled here.  On 05/02 Guardian stated that patient has a 30 day supply of medication at home and would only need to fill the olanzapine 10 mg as this was changed during this hospitalization.     Patient was smiling and expressed that he is ready to discharge. Patient was able to recognize his efforts in coping with hospitalization. He expressed no other concerns at this time. Patient does not endorse SI/HI/SIB.    Discharge Plan or Goal   [facility]) in [place]     Barriers to Discharge   Symptom stabilization, medication management care coordination      Referral Status Completed    [facility]( [facility]) in [place] discharge Wednesday 05/11.      Contacts:  Antonino  [facility] residence in [place]  Therapist:  Kulwant Hernandezx2/wk and Kathrynx1/wk. DBT Therapist Location: [place]. Wednesday and every other week.  Psychiatrist: Traci Crespo. Location: [place] .   :  [place]. Location: [place] [phone].  Guardifigueroa Gómez [phone] and sister Traci Greenwood [phone].  Primary Care Provider: Kory Hudson MD. Location: unknown. Date of last visit: unknown. Frequency: unknown. Perceived helpfulness: helpful    Mehul HUTTON [facility] crisis referrals .     Legal Status  voluntary      Cintia Moreno, [facility], [facility], 05/09/2022

## 2022-05-16 ENCOUNTER — TRANSFERRED RECORDS (OUTPATIENT)
Dept: HEALTH INFORMATION MANAGEMENT | Facility: CLINIC | Age: 42
End: 2022-05-16
Payer: COMMERCIAL

## 2022-05-18 ENCOUNTER — TELEPHONE (OUTPATIENT)
Dept: FAMILY MEDICINE | Facility: CLINIC | Age: 42
End: 2022-05-18
Payer: COMMERCIAL

## 2022-05-18 NOTE — TELEPHONE ENCOUNTER
Please triage this issue and find out what bowel issues he is having (constipation or diarrhea, frequency, any blood or abdominal pain/fever, etc) and are they requesting something in particular and what he is already using - thanks.

## 2022-05-18 NOTE — TELEPHONE ENCOUNTER
Reason for Call:  Other call back    Detailed comments: Juliet Taylor with [place] services has called and is requesting to speak with provider about a bowl movement protocol. The Pt is having troubles with his bowl movements in paste the clinic needs something in place in case the PT starts to have issue again    Phone Number Patient can be reached at: Other phone number:  [phone]    Best Time: Anytime during the week     Can we leave a detailed message on this number? YES    Call taken on 5/18/2022 at 1:05 PM by Rehana Bey

## 2022-05-18 NOTE — TELEPHONE ENCOUNTER
So I still don't know if he is having constipation or diarrhea or what his exact issue is so I don't know if he needs any treatment or PRN medication.  This should be able to be handled by triage and I don't plan to call them myself.  Please route me any information needed to make a decision and then I can let them know what to do.  I need information on what his bowel issue is and what he is taking is anything for that issue.  The only bowel related medication on his list is fiber.  Fiber he can take every day - it is not a PRN.      He should schedule follow-up in clinic as follow-up to recent ER visits but that is unrelated to bowel issues.

## 2022-05-18 NOTE — TELEPHONE ENCOUNTER
Called [facility] per PCP request. States that home care company has policy that if pt is taking a medication to help with BM, need to have protocol in place in case there was an issue, any PRN medications pt should be taking, and when pt should be seen in clinic. Will route to PCP to review and advise.     Matthew LIVE RN

## 2022-05-18 NOTE — TELEPHONE ENCOUNTER
Ok for letter to state for him to take fiber daily and if constipation occurs can take Miralax 1 cap daily until normal stools occur then stop Miralax and continue with daily fiber.

## 2022-05-18 NOTE — LETTER
May 19, 2022      Kimo Greenwood  [address] [facility] [id number]        To Whom It May Concern,     Kimo Greenwood should be taking fiber daily. If constipation occurs he can take Miralax 1 capful daily until normal stools occur, then stop Miralax and continue with daily fiber. If constipation continues to occur, please contact the clinic at the number above.           Sincerely,        Kory Hudson MD

## 2022-05-24 NOTE — PROGRESS NOTES
SUBJECTIVE:   Kimo Greenwood is a 37 year old male who presents to clinic today for the following health issues:  Patient presents to the clinic with his mother.     Patient with history of moderate major depression. Patient reports worsening mood. His dad's best friend, who the patient was close to, passed away a couple of weeks ago. He recently lost his job due to a positive drug screen. Patient takes Prozac and Abilify as directed. Last time he attended counseling was in June/July.     Patient's mother is worried about the patient's marijuana use. He recently lost his job due to a positive drug screen. The patient reports the marijuana helps relieve his back pain. He has not tried to quit, but believes quitting would be a good idea.     Patient with history of bilateral low back pain with left-sided sciatica. He has continued back pain. Patient needs a new referral to the pain clinic.     Patient with history of Patel's esophagus. Well controlled with pantoprazole.     Patient with history of Asthma. Well controlled with Dulera inhaler.     Problem list and histories reviewed & adjusted, as indicated.  Additional history: as documented    Patient Active Problem List   Diagnosis     Speech/language delay     Tobacco use disorder     Nocturnal enuresis     Moderate major depression (H)     LOREN (obstructive sleep apnea)     Insomnia     Hypertension goal BP (blood pressure) < 140/90     Morbid obesity (H)     Leukocytosis     Suicidal ideation     Chronic low back pain     Bilateral low back pain with left-sided sciatica     History of colonic polyps     Mild intellectual disability     S/P total hip arthroplasty     Vitamin D deficiency     LPRD (laryngopharyngeal reflux disease)     Patel's esophagus determined by biopsy     Mild intermittent asthma     Past Surgical History:   Procedure Laterality Date     ARTHROPLASTY HIP Left 1/25/2017    Procedure: ARTHROPLASTY HIP;  Surgeon: Bala Randall,  MD;  Location: [facility] OR     COLONOSCOPY       COLONOSCOPY N/A 10/30/2015    Procedure: COMBINED COLONOSCOPY, SINGLE OR MULTIPLE BIOPSY/POLYPECTOMY BY BIOPSY;  Surgeon: Alexis Jackson MD;  Location: [facility] GI     COLONOSCOPY N/A 11/17/2016    Procedure: COMBINED COLONOSCOPY, SINGLE OR MULTIPLE BIOPSY/POLYPECTOMY BY BIOPSY;  Surgeon: Cat Huber MD;  Location: [facility]     ESOPHAGOSCOPY, GASTROSCOPY, DUODENOSCOPY (EGD), COMBINED N/A 11/17/2016    Procedure: COMBINED ESOPHAGOSCOPY, GASTROSCOPY, DUODENOSCOPY (EGD), BIOPSY SINGLE OR MULTIPLE;  Surgeon: Cat Huber MD;  Location: [facility]     EXAM UNDER ANESTHESIA, FULGURATE CONDYLOMA ANUS, COMBINED N/A 12/22/2016    Procedure: COMBINED EXAM UNDER ANESTHESIA, FULGURATE CONDYLOMA ANUS;  Surgeon: Nya Cabello MD;  Location: [facility] OR     GENITOURINARY SURGERY      bladder, [place], [facility],Interstem stimulator implant     ORTHOPEDIC SURGERY         Social History   Substance Use Topics     Smoking status: Heavy Tobacco Smoker     Packs/day: 0.50     Years: 1.00     Types: Cigarettes     Smokeless tobacco: Never Used      Comment: Smokes E Cigs equal to 1 PPD     Alcohol use Yes      Comment: socially     Family History   Problem Relation Age of Onset     Anxiety Disorder Mother      Depression Mother      Ulcerative Colitis Mother 18     Breast Cancer Maternal Grandmother      CEREBROVASCULAR DISEASE Maternal Grandmother      Breast Cancer Maternal Aunt      CANCER Maternal Grandfather      grt uncles colon cancer             Reviewed and updated as needed this visit by clinical staffTobacco  Allergies  Meds  Med Hx  Surg Hx  Fam Hx  Soc Hx      Reviewed and updated as needed this visit by Provider         ROS:  RESP:as above   GI: as above   MUSCULOSKELETAL: as above   PSYCHIATRIC: as above     This document serves as a record of the services and decisions personally performed and made by Kory Hudson MD. It was created on his behalf  "by Jacklyn Orellana, a trained medical scribe. The creation of this document is based on the provider's statements to the medical scribe.  Jacklyn Orellana 9:28 AM December 11, 2017    OBJECTIVE:     /72 (BP Location: Right arm, Patient Position: Chair, Cuff Size: Adult Regular)  Pulse 68  Temp 97.9  F (36.6  C) (Oral)  Resp 18  Ht 1.803 m (5' 11\")  Wt (!) 158.9 kg (350 lb 6.4 oz)  SpO2 97%  BMI 48.87 kg/m2  Body mass index is 48.87 kg/(m^2).  GENERAL: healthy, alert and no distress  RESP: lungs clear to auscultation - no rales, rhonchi or wheezes  CV: regular rate and rhythm, normal S1 S2, no S3 or S4, no murmur, click or rub, no peripheral edema and peripheral pulses strong  PSYCH: mentation appears normal, affect normal/bright    PHQ-9 SCORE 9/16/2015 3/27/2017 12/11/2017   Total Score - - -   Total Score 16 1 18     ANDRES-7 SCORE 8/1/2015 3/27/2017 12/11/2017   Total Score 9 - -   Total Score - 0 0       ACT Total Scores 3/27/2017 12/11/2017   ACT TOTAL SCORE (Goal Greater than or Equal to 20) 24 22   In the past 12 months, how many times did you visit the emergency room for your asthma without being admitted to the hospital? 0 0   In the past 12 months, how many times were you hospitalized overnight because of your asthma? 0 0       ASSESSMENT/PLAN:     1. Moderate major depression (H)  Continue current medications. Recommended counseling. Consider increase fluoxetine if not improving.  Some worsening appears to be situational from grief of death of friend.  Continue to monitor closely.    2. Tobacco use disorder    3. Hypertension goal BP (blood pressure) < 140/90  Continue medication.  - lisinopril (PRINIVIL/ZESTRIL) 10 MG tablet; Take 1 tablet (10 mg) by mouth every morning  Dispense: 90 tablet; Refill: 3    4. Morbid obesity (H)    5. Mild intermittent asthma without complication  Stable.  - mometasone-formoterol (DULERA) 100-5 MCG/ACT oral inhaler; Inhale 2 puffs into the lungs 2 times daily  " Dispense: 1 Inhaler; Refill: 11    6. Patel's esophagus determined by biopsy    7. Marijuana abuse  Recommend cessation - mother and patient want to know more about treatment for marijuana use issues - will have social work contact with resources.  - CARE COORDINATION REFERRAL    8. Chronic low back pain without sciatica, unspecified back pain laterality  - PAIN MANAGEMENT REFERRAL    9. Chronic lumbosacral pain  - diclofenac (VOLTAREN) 1 % GEL topical gel; Place 2-4 g onto the skin 4 times daily as needed for moderate pain  Dispense: 100 g; Refill: 4    10. Knee pain, unspecified chronicity, unspecified laterality  - diclofenac (VOLTAREN) 1 % GEL topical gel; Place 2-4 g onto the skin 4 times daily as needed for moderate pain  Dispense: 100 g; Refill: 4    11. Major depressive disorder, recurrent episode, moderate (H)  Continue current medications. Recommended counseling.   - FLUoxetine (PROZAC) 20 MG capsule; Take 1 capsule (20 mg) by mouth every morning  Dispense: 30 capsule; Refill: 11  - ARIPiprazole (ABILIFY) 5 MG tablet; Take 1 tablet (5 mg) by mouth every morning  Dispense: 30 tablet; Refill: 11    12. Seborrheic dermatitis  - ketoconazole (NIZORAL) 2 % shampoo; Apply to the scalp and beard two times per week and wash off after 5 minutes.  Dispense: 120 mL; Refill: 11    13. Duodenitis  - pantoprazole (PROTONIX) 40 MG EC tablet; Take 1 tablet (40 mg) by mouth daily Take 30-60 minutes before first meal of the day  Dispense: 90 tablet; Refill: 3    14. LPRD (laryngopharyngeal reflux disease)  - pantoprazole (PROTONIX) 40 MG EC tablet; Take 1 tablet (40 mg) by mouth daily Take 30-60 minutes before first meal of the day  Dispense: 90 tablet; Refill: 3    15. Slow transit constipation  - psyllium 0.52 G capsule; Take 1 capsule (0.52 g) by mouth daily  Dispense: 180 capsule; Refill: 3    16. Vitamin D deficiency  - cholecalciferol 2000 UNITS CAPS; Take 1 capsule by mouth daily  Dispense: 30 capsule; Refill:  11      The information in this document, created by the medical scribe for me, accurately reflects the services I personally performed and the decisions made by me. I have reviewed and approved this document for accuracy prior to leaving the patient care area.  December 11, 2017 10:21 AM    Kory Hudson MD  [facility]     Ambulatory

## 2022-05-25 ENCOUNTER — PRE VISIT (OUTPATIENT)
Dept: ONCOLOGY | Facility: CLINIC | Age: 42
End: 2022-05-25

## 2022-05-25 NOTE — PROGRESS NOTES
RECORDS STATUS - ALL OTHER DIAGNOSIS      RECORDS RECEIVED FROM: [facility]   DATE RECEIVED: 6/29/2022   NOTES STATUS DETAILS   OFFICE NOTE from referring provider     OFFICE NOTE from medical oncologist Complete 3/25/2022    Retroperitoneal mass    2/28/2022    Retroperitoneal mass     DISCHARGE SUMMARY from hospital     DISCHARGE REPORT from the ER     OPERATIVE REPORT     MEDICATION LIST Complete [facility]   CLINICAL TRIAL TREATMENTS TO DATE     LABS     PATHOLOGY REPORTS     ANYTHING RELATED TO DIAGNOSIS Complete Labs last updated on 5/3/2022   GENONOMIC TESTING     TYPE:     IMAGING (NEED IMAGES & REPORT)     CT SCANS Scheduled CT Chest 3/23/2022    CT abdomen 3/8/2022    CT Abdomen [place] 2/19/2022    More in PACS   MRI     MAMMO     ULTRASOUND     PET

## 2022-06-01 ENCOUNTER — TELEPHONE (OUTPATIENT)
Dept: FAMILY MEDICINE | Facility: CLINIC | Age: 42
End: 2022-06-01
Payer: COMMERCIAL

## 2022-06-01 NOTE — TELEPHONE ENCOUNTER
Home care calling in regarding Myrbetriq Rx. Home care states he is out of medication, attempted to call psyc who states it was discontinued. Psyc told home care must get letter that medication was discontinued from PCP. Reviewed pt chart, per last hospital visit notes pt was supposed to continue taking Myrbetriq. Please review and advise if pt should be taking medication or not, if medication discontinued, home care needs letter that is signed by provider stating med was discontinued. Please advise.     Matthew LIVE RN

## 2022-06-02 ENCOUNTER — TRANSFERRED RECORDS (OUTPATIENT)
Dept: HEALTH INFORMATION MANAGEMENT | Facility: CLINIC | Age: 42
End: 2022-06-02
Payer: COMMERCIAL

## 2022-06-02 NOTE — TELEPHONE ENCOUNTER
Called and spoke with home care, [place]. Advised medication not prescribed by Dr. Hudson. Home Care states issue was resolved. No further action needed from PCP.     Shelton LITTLEJOHN RN

## 2022-06-15 ENCOUNTER — TELEPHONE (OUTPATIENT)
Dept: FAMILY MEDICINE | Facility: CLINIC | Age: 42
End: 2022-06-15

## 2022-06-15 NOTE — TELEPHONE ENCOUNTER
Reason for Call:  Form, our goal is to have forms completed with 72 hours, however, some forms may require a visit or additional information.    Type of letter, form or note:  orders    Who is the form from?: Home care    Where did the form come from: form was faxed in    What clinic location was the form placed at?: [facility]     Where the form was placed: Dr Hudson Box/Folder    What number is listed as a contact on the form?: none        Additional comments: please complete all pages, sign/date and contact [place] when completed     Call taken on 6/15/2022 at 2:53 PM by Jade Lakhani

## 2022-06-21 ENCOUNTER — TRANSFERRED RECORDS (OUTPATIENT)
Dept: FAMILY MEDICINE | Facility: CLINIC | Age: 42
End: 2022-06-21

## 2022-06-29 ENCOUNTER — PRE VISIT (OUTPATIENT)
Dept: ONCOLOGY | Facility: CLINIC | Age: 42
End: 2022-06-29

## 2022-07-01 NOTE — PROGRESS NOTES
RECORDS STATUS - ALL OTHER DIAGNOSIS      RECORDS RECEIVED FROM: [facility]/ [place]    DATE RECEIVED: 7/21/2022   NOTES STATUS DETAILS   OFFICE NOTE from referring provider     OFFICE NOTE from medical oncologist Complete 3/25/2022    Retroperitoneal mass    More in EPIC   DISCHARGE SUMMARY from hospital     DISCHARGE REPORT from the ER     MEDICATION LIST Complete [facility]   CLINICAL TRIAL TREATMENTS TO DATE     LABS     PATHOLOGY REPORTS Complete 3/8/2022   Retroperitoneal mass, CT-guided needle biopsy:  -Mature adipose tissue, consistent with lipomatous tumor.  Limited sample.  See COMMENT.   ANYTHING RELATED TO DIAGNOSIS Complete Labs last updated on 5/11/2022   GENONOMIC TESTING     TYPE:     IMAGING (NEED IMAGES & REPORT)     CT SCANS Scheduled CT Abdomen Pelvis (Scheduled)     CT abdomen 3/8/2022    [place] CT abdomen Pelvis    MRI     MAMMO     ULTRASOUND     PET

## 2022-07-07 DIAGNOSIS — E66.01 MORBID OBESITY (H): ICD-10-CM

## 2022-07-07 RX ORDER — LIRAGLUTIDE 6 MG/ML
INJECTION, SOLUTION SUBCUTANEOUS
Qty: 15 ML | Refills: 3 | Status: CANCELLED | OUTPATIENT
Start: 2022-07-07

## 2022-07-07 NOTE — TELEPHONE ENCOUNTER
Pharmacy is calling stating they need a Rx for needles for the Saxenda medication to be sent to them ASAP. The patient only has enough needles until tomorrow.  Fatou Pantoja   [facility]  Central Scheduler

## 2022-07-08 NOTE — TELEPHONE ENCOUNTER
Patient is completely out of medication.     Pended pen needle request.     Routing refill request to provider for review/approval because:  Medication is reported/historical    Saskia Butler RN on 7/8/2022 at 10:45 AM

## 2022-07-20 ENCOUNTER — HOSPITAL ENCOUNTER (OUTPATIENT)
Dept: GENERAL RADIOLOGY | Facility: CLINIC | Age: 42
Discharge: HOME | End: 2022-07-20
Attending: ORTHOPAEDIC SURGERY | Admitting: ORTHOPAEDIC SURGERY
Payer: COMMERCIAL

## 2022-07-20 VITALS — HEART RATE: 78 BPM | SYSTOLIC BLOOD PRESSURE: 134 MMHG | DIASTOLIC BLOOD PRESSURE: 77 MMHG

## 2022-07-20 DIAGNOSIS — M16.9 HIP OSTEOARTHRITIS: ICD-10-CM

## 2022-07-20 PROCEDURE — 250N000009 HC RX 250

## 2022-07-20 PROCEDURE — 250N000011 HC RX IP 250 OP 636

## 2022-07-20 PROCEDURE — 255N000002 HC RX 255 OP 636: Performed by: ORTHOPAEDIC SURGERY

## 2022-07-20 PROCEDURE — 77002 NEEDLE LOCALIZATION BY XRAY: CPT

## 2022-07-20 RX ORDER — IOPAMIDOL 408 MG/ML
10 INJECTION, SOLUTION INTRATHECAL ONCE
Status: COMPLETED | OUTPATIENT
Start: 2022-07-20 | End: 2022-07-20

## 2022-07-20 RX ORDER — LIDOCAINE HYDROCHLORIDE 10 MG/ML
INJECTION, SOLUTION EPIDURAL; INFILTRATION; INTRACAUDAL; PERINEURAL
Status: COMPLETED
Start: 2022-07-20 | End: 2022-07-20

## 2022-07-20 RX ORDER — BUPIVACAINE HYDROCHLORIDE 5 MG/ML
INJECTION, SOLUTION EPIDURAL; INTRACAUDAL
Status: COMPLETED
Start: 2022-07-20 | End: 2022-07-20

## 2022-07-20 RX ORDER — TRIAMCINOLONE ACETONIDE 40 MG/ML
INJECTION, SUSPENSION INTRA-ARTICULAR; INTRAMUSCULAR
Status: COMPLETED
Start: 2022-07-20 | End: 2022-07-20

## 2022-07-20 RX ADMIN — LIDOCAINE HYDROCHLORIDE 50 MG: 10 INJECTION, SOLUTION EPIDURAL; INFILTRATION; INTRACAUDAL; PERINEURAL at 13:01

## 2022-07-20 RX ADMIN — TRIAMCINOLONE ACETONIDE 40 MG: 40 INJECTION, SUSPENSION INTRA-ARTICULAR; INTRAMUSCULAR at 13:12

## 2022-07-20 RX ADMIN — IOPAMIDOL 5 ML: 408 INJECTION, SOLUTION INTRATHECAL at 13:02

## 2022-07-20 RX ADMIN — BUPIVACAINE HYDROCHLORIDE 20 MG: 5 INJECTION, SOLUTION EPIDURAL; INTRACAUDAL; PERINEURAL at 13:12

## 2022-07-20 NOTE — PROGRESS NOTES
Pt was in Radiology today for a right hip intraarticular steroid injection. Pt tolerated ortho procedure well. Pre procedure pain was 9/10 post procedure pain level 3/10. Procedure was completed by TERRANCE EDWARDS. There were no complications during this procedure. Pt verbalized understanding of written and verbal instructions and left department in stable and satisfactory condition with staff. There is no evidence of bleeding or any other complications upon discharge.

## 2022-07-21 ENCOUNTER — ONCOLOGY VISIT (OUTPATIENT)
Dept: ONCOLOGY | Facility: CLINIC | Age: 42
End: 2022-07-21
Attending: INTERNAL MEDICINE
Payer: COMMERCIAL

## 2022-07-21 ENCOUNTER — PRE VISIT (OUTPATIENT)
Dept: ONCOLOGY | Facility: CLINIC | Age: 42
End: 2022-07-21

## 2022-07-21 VITALS
BODY MASS INDEX: 44.1 KG/M2 | TEMPERATURE: 97.4 F | OXYGEN SATURATION: 95 % | RESPIRATION RATE: 18 BRPM | SYSTOLIC BLOOD PRESSURE: 126 MMHG | HEIGHT: 71 IN | HEART RATE: 86 BPM | DIASTOLIC BLOOD PRESSURE: 75 MMHG | WEIGHT: 315 LBS

## 2022-07-21 DIAGNOSIS — D72.829 LEUKOCYTOSIS, UNSPECIFIED TYPE: Primary | ICD-10-CM

## 2022-07-21 DIAGNOSIS — D64.9 ANEMIA, UNSPECIFIED TYPE: ICD-10-CM

## 2022-07-21 LAB
ALBUMIN SERPL-MCNC: 3.4 G/DL (ref 3.4–5)
ALP SERPL-CCNC: 104 U/L (ref 40–150)
ALT SERPL W P-5'-P-CCNC: 30 U/L (ref 0–70)
ANION GAP SERPL CALCULATED.3IONS-SCNC: 6 MMOL/L (ref 3–14)
AST SERPL W P-5'-P-CCNC: 8 U/L (ref 0–45)
BASOPHILS # BLD AUTO: 0 10E3/UL (ref 0–0.2)
BASOPHILS NFR BLD AUTO: 0 %
BILIRUB SERPL-MCNC: 0.3 MG/DL (ref 0.2–1.3)
BUN SERPL-MCNC: 14 MG/DL (ref 7–30)
CALCIUM SERPL-MCNC: 9.4 MG/DL (ref 8.5–10.1)
CHLORIDE BLD-SCNC: 103 MMOL/L (ref 94–109)
CO2 SERPL-SCNC: 26 MMOL/L (ref 20–32)
CREAT SERPL-MCNC: 0.71 MG/DL (ref 0.66–1.25)
EOSINOPHIL # BLD AUTO: 0 10E3/UL (ref 0–0.7)
EOSINOPHIL NFR BLD AUTO: 0 %
ERYTHROCYTE [DISTWIDTH] IN BLOOD BY AUTOMATED COUNT: 13 % (ref 10–15)
FERRITIN SERPL-MCNC: 107 NG/ML (ref 26–388)
FOLATE SERPL-MCNC: 15.6 NG/ML (ref 4.6–34.8)
GFR SERPL CREATININE-BSD FRML MDRD: >90 ML/MIN/1.73M2
GLUCOSE BLD-MCNC: 101 MG/DL (ref 70–99)
HCT VFR BLD AUTO: 37.7 % (ref 40–53)
HGB BLD-MCNC: 12.4 G/DL (ref 13.3–17.7)
IMM GRANULOCYTES # BLD: 0.1 10E3/UL
IMM GRANULOCYTES NFR BLD: 1 %
IRON SATN MFR SERPL: 40 % (ref 15–46)
IRON SERPL-MCNC: 112 UG/DL (ref 35–180)
LDH SERPL L TO P-CCNC: 195 U/L (ref 85–227)
LYMPHOCYTES # BLD AUTO: 1.1 10E3/UL (ref 0.8–5.3)
LYMPHOCYTES NFR BLD AUTO: 6 %
MCH RBC QN AUTO: 28.6 PG (ref 26.5–33)
MCHC RBC AUTO-ENTMCNC: 32.9 G/DL (ref 31.5–36.5)
MCV RBC AUTO: 87 FL (ref 78–100)
MONOCYTES # BLD AUTO: 0.8 10E3/UL (ref 0–1.3)
MONOCYTES NFR BLD AUTO: 4 %
NEUTROPHILS # BLD AUTO: 16.4 10E3/UL (ref 1.6–8.3)
NEUTROPHILS NFR BLD AUTO: 89 %
NRBC # BLD AUTO: 0 10E3/UL
NRBC BLD AUTO-RTO: 0 /100
PATH REPORT.COMMENTS IMP SPEC: NORMAL
PATH REPORT.FINAL DX SPEC: NORMAL
PATH REPORT.MICROSCOPIC SPEC OTHER STN: NORMAL
PATH REPORT.MICROSCOPIC SPEC OTHER STN: NORMAL
PATH REPORT.RELEVANT HX SPEC: NORMAL
PLATELET # BLD AUTO: 265 10E3/UL (ref 150–450)
POTASSIUM BLD-SCNC: 3.5 MMOL/L (ref 3.4–5.3)
PROT SERPL-MCNC: 7.6 G/DL (ref 6.8–8.8)
RBC # BLD AUTO: 4.33 10E6/UL (ref 4.4–5.9)
RETICS # AUTO: 0.07 10E6/UL (ref 0.03–0.1)
RETICS/RBC NFR AUTO: 1.7 % (ref 0.5–2)
SODIUM SERPL-SCNC: 135 MMOL/L (ref 133–144)
TIBC SERPL-MCNC: 278 UG/DL (ref 240–430)
TSH SERPL DL<=0.005 MIU/L-ACNC: 0.67 MU/L (ref 0.4–4)
VIT B12 SERPL-MCNC: 358 PG/ML (ref 232–1245)
WBC # BLD AUTO: 18.4 10E3/UL (ref 4–11)

## 2022-07-21 PROCEDURE — 84443 ASSAY THYROID STIM HORMONE: CPT | Performed by: INTERNAL MEDICINE

## 2022-07-21 PROCEDURE — 82746 ASSAY OF FOLIC ACID SERUM: CPT | Performed by: INTERNAL MEDICINE

## 2022-07-21 PROCEDURE — 99214 OFFICE O/P EST MOD 30 MIN: CPT | Performed by: INTERNAL MEDICINE

## 2022-07-21 PROCEDURE — 82728 ASSAY OF FERRITIN: CPT | Performed by: INTERNAL MEDICINE

## 2022-07-21 PROCEDURE — 83615 LACTATE (LD) (LDH) ENZYME: CPT | Performed by: INTERNAL MEDICINE

## 2022-07-21 PROCEDURE — 85025 COMPLETE CBC W/AUTO DIFF WBC: CPT | Performed by: INTERNAL MEDICINE

## 2022-07-21 PROCEDURE — G0463 HOSPITAL OUTPT CLINIC VISIT: HCPCS

## 2022-07-21 PROCEDURE — 83550 IRON BINDING TEST: CPT | Performed by: INTERNAL MEDICINE

## 2022-07-21 PROCEDURE — 82607 VITAMIN B-12: CPT | Performed by: INTERNAL MEDICINE

## 2022-07-21 PROCEDURE — 36415 COLL VENOUS BLD VENIPUNCTURE: CPT | Performed by: INTERNAL MEDICINE

## 2022-07-21 PROCEDURE — 80053 COMPREHEN METABOLIC PANEL: CPT | Performed by: INTERNAL MEDICINE

## 2022-07-21 PROCEDURE — 85045 AUTOMATED RETICULOCYTE COUNT: CPT | Performed by: INTERNAL MEDICINE

## 2022-07-21 RX ORDER — OLANZAPINE AND SAMIDORPHAN L-MALATE 10; 10 MG/1; MG/1
1 TABLET, FILM COATED ORAL AT BEDTIME
COMMUNITY

## 2022-07-21 ASSESSMENT — PAIN SCALES - GENERAL: PAINLEVEL: NO PAIN (0)

## 2022-07-21 NOTE — PROGRESS NOTES
Medical Assistant Note:  Kimo Greenwood presents today for blood draw.    Patient seen by provider today: Yes: Dr Amato.   present during visit today: Not Applicable.    Concerns: No Concerns.    Procedure:  Lab draw site: rt hand, Needle type: butterfly, Gauge: 23.    Post Assessment:  Labs drawn without difficulty: Yes.    Discharge Plan:  Departure Mode: Ambulatory.    Face to Face Time: 10 mins.    Dalia Perez CMA, CMA

## 2022-07-21 NOTE — NURSING NOTE
"Oncology Rooming Note    July 21, 2022 9:07 AM   Kimo Greenwood is a 41 year old male who presents for:    Chief Complaint   Patient presents with     Oncology Clinic Visit     New Patient     Initial Vitals: /75   Pulse 86   Temp 97.4  F (36.3  C) (Tympanic)   Resp 18   Ht 1.791 m (5' 10.5\")   Wt (!) 174.4 kg (384 lb 6.4 oz)   SpO2 95%   BMI 54.38 kg/m   Estimated body mass index is 54.38 kg/m  as calculated from the following:    Height as of this encounter: 1.791 m (5' 10.5\").    Weight as of this encounter: 174.4 kg (384 lb 6.4 oz). Body surface area is 2.95 meters squared.  No Pain (0) Comment: Data Unavailable   No LMP for male patient.  Allergies reviewed: Yes  Medications reviewed: Yes    Medications: Medication refills not needed today.  Pharmacy name entered into [facility]:    [id number] - [place], [place] - [address] [facility] [place] - [address] [facility]. - [place], [place] - [address]    Clinical concerns: [facility]              "

## 2022-07-21 NOTE — LETTER
7/21/2022         RE: Kimo Greenwood  [id number] [facility] [id number]      [facility]    Hematology/Oncology New Patient Note      Today's Date: 7/21/22    Reason for Consultation: Elevated WBC  Referring Provider: Kory Hudson MD      HISTORY OF PRESENT ILLNESS: Kimo Greenwood is a 41 year old male who is referred for elevated WBC, anemia, splenomegaly. 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    Patient has evidence of chronic, intermittent leukocytosis since 2012. Specifically, he has had neutrophilia. On 4/22/22, WBC was 11.1, ANC 9.7. On 4/27/22, WBC was 8.0, ANC 4.8. Patient also has evidence of chronic, intermittent NCNC anemia. Record review shows gillian of 9.1 in 2017. Patient and mother unable to recall the events of that timeframe.     Of note, patient was hospitalized for pneumonia earlier this year. Per record review, an incidental mass was noted an 8.7 cm lipomatous mass in the retroperitoneum near the kidney and spleen. He was evaluated by surgical oncology, Dr. Ojeda, with biopsy on 3/8/22 returning as lipomatous tumor/liposarcoma, negative for MDM2. He is due for follow up imaging in September 2022 with follow in surg onc clinic.    A repeat CT chest was done on 3/23/22, which returned with significant improvement RLL pneumonia. It continued to show the 8.7 x 7.9 x 9.3 cm RP mass in the LUQ. The spleen was enlarged to 15.5 cm. No other adenopathy.     Patient is present with mother. They report only new medication is liraglutide for weight management. No weight loss, fever, drenching night sweats, or LAD.     Of note, patient lives at [facility]. He is able to perform ADLs. He does smoke cigarettes.       REVIEW OF SYSTEMS:   A 14 point ROS was reviewed with pertinent positives and negatives in the  HPI.        HOME MEDICATIONS:  Current Outpatient Medications   Medication Sig Dispense Refill     albuterol (VENTOLIN HFA) 108 (90 Base) MCG/ACT inhaler INHALE 2 PUFFS INTO LUNGS EVERY SIX HOURS AS NEEDED FOR SHORTNESS OF BREATH / DYSPNEA OR WHEEZING 18 g 1     DULoxetine (CYMBALTA) 60 MG capsule Take 120 mg by mouth daily        FIBER- MG tablet TAKE 2 TABLETS BY MOUTH TWICE A DAY (Patient taking differently: Take 2 tablets by mouth 2 times daily) 360 tablet 3     hydrochlorothiazide (HYDRODIURIL) 25 MG tablet Take 1 tablet (25 mg) by mouth daily 90 tablet 3     insulin pen needle (31G X 6 MM) 31G X 6 MM miscellaneous Use 1 pen needles daily or as directed. 100 each 0     Lidocaine (LIDOCARE) 4 % Patch Place 1-2 patches onto the skin every 24 hours To prevent lidocaine toxicity, patient should be patch free for 12 hrs daily. 60 patch 1     liraglutide - Weight Management (SAXENDA) 18 MG/3ML pen Inject 0.6 mg Subcutaneous daily for 7 days, THEN 1.2 mg daily for 7 days, THEN 1.8 mg daily for 7 days, THEN 2.4 mg daily for 7 days, THEN 3 mg daily. 27 mL 1     liraglutide - Weight Management (SAXENDA) 18 MG/3ML pen Inject 3 mg subcutaneously daily. 15 mL 3     lisinopril (ZESTRIL) 10 MG tablet TAKE 1 TABLET BY MOUTH EVERY MORNING 28 tablet 3     mirabegron (MYRBETRIQ) 50 MG 24 hr tablet Take 1 tablet by mouth daily       naproxen sodium 220 MG capsule If Tylenol is not helpful, this can be added 2 tabs twice a day with meals. (Patient taking differently: Take 440 mg by mouth 2 times daily (with meals)) 60 capsule 3     OLANZapine-samidorphan (LYBALVI) 10-10 MG TABS tablet Take 1 tablet by mouth At Bedtime       order for DME Equipment being ordered: CPAP supplies 1 each 0     oxybutynin ER (DITROPAN XL) 15 MG 24 hr tablet Take 30 mg by mouth daily       pantoprazole (PROTONIX) 40 MG EC tablet TAKE 1 TABLET BY MOUTH 30-60 MINUTES BEFORE FIRST MEAL OF THE DAY 28 tablet 3     prazosin (MINIPRESS) 1 MG capsule Take  3 mg by mouth At Bedtime       VITAMIN D3 50 MCG (2000 UT) tablet TAKE ONE TABLET BY MOUTH EVERY DAY 90 tablet 3     OLANZapine (ZYPREXA) 10 MG tablet Take 1 tablet (10 mg) by mouth At Bedtime (Patient not taking: Reported on 7/21/2022) 30 tablet 0         ALLERGIES:  Allergies   Allergen Reactions     Other [No Clinical Screening - See Comments] Other (See Comments)     Awoke from anesthesia with anger and ripping off dressing, after interstim placement, outside hospital 2013         PAST MEDICAL HISTORY:  Past Medical History:   Diagnosis Date     Arthritis     DDD hips and knees     Asthma      Asthma      Patel's esophagus      Chronic pain      Chronic pain - left hip/leg pain     degenerative disc disease, back, hips, knees     Gastroesophageal reflux disease      History of anesthesia complications     agitation x1 after interstim 2013     Hypertension      Hypertension      Leukocytosis      LPRD (laryngopharyngeal reflux disease)      Marijuana abuse      Marijuana abuse      MDD (major depressive disorder)      MDD (major depressive disorder)      Mental retardation, mild (I.Q. 50-70)      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    With associated speech/language delay     Obesity 11/11/2010     LOREN (obstructive sleep apnea)     Uses a CPAP     LOREN (obstructive sleep apnea)      Seborrheic dermatitis      Seborrheic dermatitis      Sleep apnea     CPAP         PAST SURGICAL HISTORY:  Past Surgical History:   Procedure Laterality Date     ARTHROPLASTY HIP Left 1/25/2017    Procedure: ARTHROPLASTY HIP;  Surgeon: Bala Randall MD;  Location: [facility] OR     ARTHROPLASTY HIP Left      ARTHROPLASTY REVISION HIP Left 6/24/2019    Procedure: Revision left total hip arthroplasty with a head and liner exchange to a Biomet dual mobility head and liner.;  Surgeon: Bala Randall MD;  Location: [facility] OR     BLADDER SURGERY      Ibarra, [facility],Interstem stimulator implant     CLOSED  REDUCTION HIP Left 6/10/2019    Procedure: Closed reduction under general anesthesia left recurrent prosthetic hip dislocation;  Surgeon: Rafat Cazares MD;  Location: [facility] OR     COLONOSCOPY N/A 10/30/2015    Procedure: COMBINED COLONOSCOPY, SINGLE OR MULTIPLE BIOPSY/POLYPECTOMY BY BIOPSY;  Surgeon: Alexis Jackson MD;  Location: [facility] GI     COLONOSCOPY N/A 11/17/2016    Procedure: COMBINED COLONOSCOPY, SINGLE OR MULTIPLE BIOPSY/POLYPECTOMY BY BIOPSY;  Surgeon: Cat Huber MD;  Location: [facility]     COLONOSCOPY N/A 10/28/2020    Procedure: COLONOSCOPY;  Surgeon: You Kraus MD;  Location: [facility] OR     COLONOSCOPY       ESOPHAGOSCOPY, GASTROSCOPY, DUODENOSCOPY (EGD), COMBINED N/A 11/17/2016    Procedure: COMBINED ESOPHAGOSCOPY, GASTROSCOPY, DUODENOSCOPY (EGD), BIOPSY SINGLE OR MULTIPLE;  Surgeon: Cat Huber MD;  Location: [facility]     ESOPHAGOSCOPY, GASTROSCOPY, DUODENOSCOPY (EGD), COMBINED N/A 3/12/2020    Procedure: ESOPHAGOGASTRODUODENOSCOPY WITH BIOPSIES;  Surgeon: You Kraus MD;  Location: [facility] OR     ESOPHAGOSCOPY, GASTROSCOPY, DUODENOSCOPY (EGD), COMBINED N/A 10/28/2020    Procedure: ESOPHAGOGASTRODUODENOSCOPY, WITH BIOPSY;  Surgeon: You Kraus MD;  Location: [facility] OR     ESOPHAGOSCOPY, GASTROSCOPY, DUODENOSCOPY (EGD), COMBINED       EXAM UNDER ANESTHESIA, FULGURATE CONDYLOMA ANUS, COMBINED N/A 12/22/2016    Procedure: COMBINED EXAM UNDER ANESTHESIA, FULGURATE CONDYLOMA ANUS;  Surgeon: Nya Cabello MD;  Location: [facility] OR     GENITOURINARY SURGERY       JOINT REPLACEMENT Left 2017    MARGO, Revision Left MARGO     OTHER SURGICAL HISTORY      interstim stimulator implant     NE CYSTOURETHROSCOPY INJ CHEMODENERVATION BLADDER N/A 1/16/2019    Procedure: CYSTOSCOPY BOTOX BLADDER INJECTIONS (100 UNITS IN 10CC);  Surgeon: Didier Ibarra MD;  Location: [facility];  Service: Urology     NE IMPLANT PERIPH/GASTRIC NEUROSTIM/ N/A 1/8/2020    Procedure:  "NEW INTERSTIM LEAD, REPLACE OLD; NEW INTERSTIM BATTERY, REPLACE OLD;  Surgeon: Didier Ibarra MD;  Location: [facility] OR;  Service: Urology         SOCIAL HISTORY:  Social History     Socioeconomic History     Marital status: Single     Spouse name: Not on file     Number of children: Not on file     Years of education: Not on file     Highest education level: Not on file   Occupational History     Not on file   Tobacco Use     Smoking status: Current Every Day Smoker     Packs/day: 1.00     Years: 1.00     Pack years: 1.00     Types: Vaping Device     Smokeless tobacco: Current User     Types: Chew     Tobacco comment: Smokes E Cigs equal to 1 PPD   Vaping Use     Vaping Use: Every day     Substances: Nicotine, Flavoring     Devices: Refillable tank   Substance and Sexual Activity     Alcohol use: Yes     Comment: socially--\"not very often\" \"once a month\"     Drug use: No     Comment: patient denies any marijuana use     Sexual activity: Yes     Partners: Female     Birth control/protection: Condom   Other Topics Concern     Parent/sibling w/ CABG, MI or angioplasty before 65F 55M? No   Social History Narrative     Not on file     Social Determinants of Health     Financial Resource Strain: Not on file   Food Insecurity: Not on file   Transportation Needs: Not on file   Physical Activity: Not on file   Stress: Not on file   Social Connections: Not on file   Intimate Partner Violence: Not on file   Housing Stability: Not on file         FAMILY HISTORY:  Family History   Problem Relation Age of Onset     Anxiety Disorder Mother      Depression Mother      Ulcerative Colitis Mother 18     Breast Cancer Maternal Grandmother      Cerebrovascular Disease Maternal Grandmother      Breast Cancer Maternal Aunt      Cancer Maternal Grandfather         grt uncles colon cancer         PHYSICAL EXAM:  Vital signs:  /75   Pulse 86   Temp 97.4  F (36.3  C) (Tympanic)   Resp 18   Ht 1.791 m (5' 10.5\")   Wt (!) " 174.4 kg (384 lb 6.4 oz)   SpO2 95%   BMI 54.38 kg/m       GENERAL/CONSTITUTIONAL: No acute distress.  EYES: Pupils are equal, round, and react to light and accommodation. Extraocular movements intact.  No scleral icterus.  ENT/MOUTH: Neck supple. Oropharynx clear, no mucositis.  LYMPH: No anterior cervical, posterior cervical, supraclavicular, axillary or inguinal adenopathy.   RESPIRATORY: Clear to auscultation bilaterally. No crackles or wheezing.   CARDIOVASCULAR: Regular rate and rhythm without murmurs, gallops, or rubs.  GASTROINTESTINAL: No hepatosplenomegaly, masses, or tenderness. The patient has normal bowel sounds. No guarding.  No distention.  MUSCULOSKELETAL: Warm and well-perfused, no cyanosis, clubbing, or edema.   NEUROLOGIC: Cranial nerves II-XII are intact. Alert, oriented, answers questions appropriately.  INTEGUMENTARY: No rashes or jaundice.  GAIT: Antalgic gait.      LABS:  CBC RESULTS:   Recent Labs   Lab Test 04/27/22  0842 04/22/22  1644   WBC 8.0 11.1*   RBC  --  4.58   HGB  --  12.9*   HCT  --  39.1*   MCV  --  85   MCH  --  28.2   MCHC  --  33.0   RDW  --  13.2   PLT  --  253       Recent Labs   Lab Test 05/03/22  0959 04/27/22  0842 04/22/22  1644     --  134   POTASSIUM 3.9 3.8 3.5   CHLORIDE 103  --  99   CO2 27  --  31   ANIONGAP 10  --  4   GLC 84  --  99   BUN 11  --  17   CR 0.83  --  0.96   FILEMON 9.7  --  9.2         PATHOLOGY:  Surgical Pathology Report                         Case: [id number]                                   Authorizing Provider:  Star Ojeda MD         Collected:           03/08/2022 11:21 AM           Ordering Location:     [facility]   Received:            03/08/2022 01:49 PM                                  Imaging                                                                       Pathologist:           Heather Sidhu MD                                                            Specimen:    Retroperitoneal, Retroperitoneal  Biopsy                                                    Final Diagnosis   Retroperitoneal mass, CT-guided needle biopsy:  -Mature adipose tissue, consistent with lipomatous tumor.  Limited sample.  See COMMENT.   Electronically signed by Heather Sidhu MD on 3/14/2022 at  2:36 PM   Comment    The morphologic findings are concerning for liposarcoma.  The biopsy is limited in nature, comprising of needle biopsy cores of lipomatous tissue.  The biopsy tissue may not be representative of the radiographically identified large (reported 8.7 cm) mass.  Fluorescence in-situ hybridization (FISH) for MDM2 probe is pending at this time (please see separate forthcoming [facility] cytogenetics laboratory report for results).       INTERPRETATION:  None (0%) of the interphase cells examined had a signal pattern indicative of amplification of MDM2.      IMAGING:  CT CHEST WITHOUT CONTRAST March 23, 2022  FINDINGS:   Chest/mediastinum: No cardiomegaly. Small pericardial effusion. No  significant mediastinal, hilar or axillary lymphadenopathy. Asymmetric  elevation of the left hemidiaphragm as compared to the right.     Lung/pleura: Trace left pleural effusion. No significant right pleural  effusion. No significant pneumothorax. Significant interval  improvement in the previously seen right lower lobe consolidative  pulmonary opacities as compared to 2/19/2022 outside CT. Only small  patchy foci of opacity in the medial aspect of the right lower lobe is  seen.      Upper abdomen: Limited evaluation of the upper abdomen due to lack of  coverage and contrast. Cholelithiasis without CT evidence of acute  cholecystitis. There is approximately 8.7 x 7.9 x 9.3 cm (axial and CC  dimensions, respectively), fatty area with minimal fat stranding in  the left upper quadrant retroperitoneal space just above the superior  pole of the kidney and abutting the adjacent adrenal gland, kidney,  pancreas and spleen. The spleen is  enlarged measuring 15.5 cm in  craniocaudal dimension.     Bones and soft tissue: Multilevel degenerative changes of the spine.  Diffuse heterogenous appearance of the spine of indeterminate  etiology.                                                                      IMPRESSION:    1. Significant interval improvement in the previously seen right lower  lobe consolidative pulmonary opacity as compared to 2/19/2022 outside  CT. Only small patchy opacity in the medial aspect of the right lower  lobe remaining.  2. Small pericardial effusion.  3. Trace left pleural effusion.  4. There is approximately 8.7 x 7.9 x 9.3 cm fat attenuating area with  minimal fat stranding in the left upper quadrant, consistent with the  biopsy-proven lipomatous tumor.  5. Cholelithiasis without CT evidence of acute cholecystitis.  6. Splenomegaly.  7. Multilevel degenerative changes of the spine with diffuse  heterogenous appearance of the thoracic spine of indeterminate  Etiology.      ASSESSMENT/PLAN:  Kimo Greenwood is a 41 year old male who is referred for elevated WBC, anemia, splenomegaly. 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    1) Neutrophilia, anemia, splenomegaly  -Patient has evidence of chronic, intermittent neutrophilia and NC/NC anemia.  -Asymptomatic.  -On 4/27/22, total WBCs were 8.0.  -I discussed with the patient we are in need of repeat studies. We discussed possible underlying etiologies to include reactive, inflammatory, but with the involvement of two cell lines, there is a possibility of an underlying lymphoproliferative disorder.  -Check CBC, CMP, peripheral smear, LDH, iron studies, B12/folate.   -I briefly discussed the possibility of a bone marrow biopsy, but will hold for now.     2) 8.7 x 7.9 x 9.3 cm lipomatous tumor/liposarcoma of the retroperitoneum  -s/p  biopsy 3/2022 returning as above with negative MDM2.  -Due for repeat scans and follow up with Dr. Ojeda in 9/2022.    3) History of MDD    4) History of HTN, LOREN    5) Follow up in 1 month. Will determine further workup based on repeat labs above.     Thank you for referring Willard Greenwood to clinic. Adequate time was dedicated to patient questions and answered to the expressed satisfaction of the patient and family members.         Danna Amato DO  Hematology/Oncology  [facility]          Danna Amato DO

## 2022-07-21 NOTE — LETTER
7/21/2022         RE: Kimo Greenwood  [address] [facility] [address]        Dear Colleague,    Thank you for referring your patient, Kimo Greenwood, to the [facility]. Please see a copy of my visit note below.    [facility]    Hematology/Oncology New Patient Note      Today's Date: 7/21/22    Reason for Consultation: Elevated WBC  Referring Provider: Kory Hudson MD      HISTORY OF PRESENT ILLNESS: Kimo Greenwood is a 41 year old male who is referred for elevated WBC, anemia, splenomegaly. 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    Patient has evidence of chronic, intermittent leukocytosis since 2012. Specifically, he has had neutrophilia. On 4/22/22, WBC was 11.1, ANC 9.7. On 4/27/22, WBC was 8.0, ANC 4.8. Patient also has evidence of chronic, intermittent NCNC anemia. Record review shows gillian of 9.1 in 2017. Patient and mother unable to recall the events of that timeframe.     Of note, patient was hospitalized for pneumonia earlier this year. Per record review, an incidental mass was noted an 8.7 cm lipomatous mass in the retroperitoneum near the kidney and spleen. He was evaluated by surgical oncology, Dr. Ojeda, with biopsy on 3/8/22 returning as lipomatous tumor/liposarcoma, negative for MDM2. He is due for follow up imaging in September 2022 with follow in surg onc clinic.    A repeat CT chest was done on 3/23/22, which returned with significant improvement RLL pneumonia. It continued to show the 8.7 x 7.9 x 9.3 cm RP mass in the LUQ. The spleen was enlarged to 15.5 cm. No other adenopathy.     Patient is present with mother. They report only new medication is liraglutide for weight management. No weight loss, fever, drenching night sweats, or LAD.     Of note, patient lives at [facility]. He is able to perform ADLs. He does smoke cigarettes.       REVIEW OF SYSTEMS:   A 14 point ROS was reviewed with pertinent positives and negatives in the HPI.        HOME MEDICATIONS:  Current Outpatient Medications   Medication Sig Dispense Refill     albuterol (VENTOLIN HFA) 108 (90 Base) MCG/ACT inhaler INHALE 2 PUFFS INTO LUNGS EVERY SIX HOURS AS NEEDED FOR SHORTNESS OF BREATH / DYSPNEA OR WHEEZING 18 g 1     DULoxetine (CYMBALTA) 60 MG capsule Take 120 mg by mouth daily        FIBER- MG tablet TAKE 2 TABLETS BY MOUTH TWICE A DAY (Patient taking differently: Take 2 tablets by mouth 2 times daily) 360 tablet 3     hydrochlorothiazide (HYDRODIURIL) 25 MG tablet Take 1 tablet (25 mg) by mouth daily 90 tablet 3     insulin pen needle (31G X 6 MM) 31G X 6 MM miscellaneous Use 1 pen needles daily or as directed. 100 each 0     Lidocaine (LIDOCARE) 4 % Patch Place 1-2 patches onto the skin every 24 hours To prevent lidocaine toxicity, patient should be patch free for 12 hrs daily. 60 patch 1     liraglutide - Weight Management (SAXENDA) 18 MG/3ML pen Inject 0.6 mg Subcutaneous daily for 7 days, THEN 1.2 mg daily for 7 days, THEN 1.8 mg daily for 7 days, THEN 2.4 mg daily for 7 days, THEN 3 mg daily. 27 mL 1     liraglutide - Weight Management (SAXENDA) 18 MG/3ML pen Inject 3 mg subcutaneously daily. 15 mL 3     lisinopril (ZESTRIL) 10 MG tablet TAKE 1 TABLET BY MOUTH EVERY MORNING 28 tablet 3     mirabegron (MYRBETRIQ) 50 MG 24 hr tablet Take 1 tablet by mouth daily       naproxen sodium 220 MG capsule If Tylenol is not helpful, this can be added 2 tabs twice a day with meals. (Patient taking differently: Take 440 mg by mouth 2 times daily (with meals)) 60 capsule 3     OLANZapine-samidorphan (LYBALVI) 10-10 MG TABS tablet Take 1 tablet by mouth At Bedtime       order for DME Equipment being ordered: CPAP supplies 1 each 0     oxybutynin ER (DITROPAN XL) 15 MG 24 hr tablet Take 30 mg by mouth daily        pantoprazole (PROTONIX) 40 MG EC tablet TAKE 1 TABLET BY MOUTH 30-60 MINUTES BEFORE FIRST MEAL OF THE DAY 28 tablet 3     prazosin (MINIPRESS) 1 MG capsule Take 3 mg by mouth At Bedtime       VITAMIN D3 50 MCG (2000 UT) tablet TAKE ONE TABLET BY MOUTH EVERY DAY 90 tablet 3     OLANZapine (ZYPREXA) 10 MG tablet Take 1 tablet (10 mg) by mouth At Bedtime (Patient not taking: Reported on 7/21/2022) 30 tablet 0         ALLERGIES:  Allergies   Allergen Reactions     Other [No Clinical Screening - See Comments] Other (See Comments)     Awoke from anesthesia with anger and ripping off dressing, after interstim placement, outside hospital 2013         PAST MEDICAL HISTORY:  Past Medical History:   Diagnosis Date     Arthritis     DDD hips and knees     Asthma      Asthma      Patel's esophagus      Chronic pain      Chronic pain - left hip/leg pain     degenerative disc disease, back, hips, knees     Gastroesophageal reflux disease      History of anesthesia complications     agitation x1 after interstim 2013     Hypertension      Hypertension      Leukocytosis      LPRD (laryngopharyngeal reflux disease)      Marijuana abuse      Marijuana abuse      MDD (major depressive disorder)      MDD (major depressive disorder)      Mental retardation, mild (I.Q. 50-70)      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    With associated speech/language delay     Obesity 11/11/2010     LOREN (obstructive sleep apnea)     Uses a CPAP     LOREN (obstructive sleep apnea)      Seborrheic dermatitis      Seborrheic dermatitis      Sleep apnea     CPAP         PAST SURGICAL HISTORY:  Past Surgical History:   Procedure Laterality Date     ARTHROPLASTY HIP Left 1/25/2017    Procedure: ARTHROPLASTY HIP;  Surgeon: Bala Randall MD;  Location: [facility] OR     ARTHROPLASTY HIP Left      ARTHROPLASTY REVISION HIP Left 6/24/2019    Procedure: Revision left total hip arthroplasty with a head and liner exchange to a Biomet dual mobility head  and liner.;  Surgeon: Bala Randall MD;  Location: [facility] OR     BLADDER SURGERY      Ibarra, [facility],Interstem stimulator implant     CLOSED REDUCTION HIP Left 6/10/2019    Procedure: Closed reduction under general anesthesia left recurrent prosthetic hip dislocation;  Surgeon: Rafat Cazares MD;  Location: [facility] OR     COLONOSCOPY N/A 10/30/2015    Procedure: COMBINED COLONOSCOPY, SINGLE OR MULTIPLE BIOPSY/POLYPECTOMY BY BIOPSY;  Surgeon: Alexis Jackson MD;  Location: [facility] GI     COLONOSCOPY N/A 11/17/2016    Procedure: COMBINED COLONOSCOPY, SINGLE OR MULTIPLE BIOPSY/POLYPECTOMY BY BIOPSY;  Surgeon: Cat Huber MD;  Location: [facility]     COLONOSCOPY N/A 10/28/2020    Procedure: COLONOSCOPY;  Surgeon: You Kraus MD;  Location: [facility] OR     COLONOSCOPY       ESOPHAGOSCOPY, GASTROSCOPY, DUODENOSCOPY (EGD), COMBINED N/A 11/17/2016    Procedure: COMBINED ESOPHAGOSCOPY, GASTROSCOPY, DUODENOSCOPY (EGD), BIOPSY SINGLE OR MULTIPLE;  Surgeon: Cat Huber MD;  Location: [facility]     ESOPHAGOSCOPY, GASTROSCOPY, DUODENOSCOPY (EGD), COMBINED N/A 3/12/2020    Procedure: ESOPHAGOGASTRODUODENOSCOPY WITH BIOPSIES;  Surgeon: You Kraus MD;  Location: [facility]     ESOPHAGOSCOPY, GASTROSCOPY, DUODENOSCOPY (EGD), COMBINED N/A 10/28/2020    Procedure: ESOPHAGOGASTRODUODENOSCOPY, WITH BIOPSY;  Surgeon: You Kraus MD;  Location: [facility] OR     ESOPHAGOSCOPY, GASTROSCOPY, DUODENOSCOPY (EGD), COMBINED       EXAM UNDER ANESTHESIA, FULGURATE CONDYLOMA ANUS, COMBINED N/A 12/22/2016    Procedure: COMBINED EXAM UNDER ANESTHESIA, FULGURATE CONDYLOMA ANUS;  Surgeon: Nya Cabello MD;  Location: [facility] OR     GENITOURINARY SURGERY       JOINT REPLACEMENT Left 2017    MARGO, Revision Left MARGO     OTHER SURGICAL HISTORY      interstim stimulator implant     VT CYSTOURETHROSCOPY INJ CHEMODENERVATION BLADDER N/A 1/16/2019    Procedure: CYSTOSCOPY BOTOX BLADDER INJECTIONS (100 UNITS IN 10CC);   "Surgeon: Didier Ibarra MD;  Location: [facility];  Service: Urology     NE IMPLANT PERIPH/GASTRIC NEUROSTIM/ N/A 1/8/2020    Procedure: NEW INTERSTIM LEAD, REPLACE OLD; NEW INTERSTIM BATTERY, REPLACE OLD;  Surgeon: Didier Ibarra MD;  Location: [facility];  Service: Urology         SOCIAL HISTORY:  Social History     Socioeconomic History     Marital status: Single     Spouse name: Not on file     Number of children: Not on file     Years of education: Not on file     Highest education level: Not on file   Occupational History     Not on file   Tobacco Use     Smoking status: Current Every Day Smoker     Packs/day: 1.00     Years: 1.00     Pack years: 1.00     Types: Vaping Device     Smokeless tobacco: Current User     Types: Chew     Tobacco comment: Smokes E Cigs equal to 1 PPD   Vaping Use     Vaping Use: Every day     Substances: Nicotine, Flavoring     Devices: Refillable tank   Substance and Sexual Activity     Alcohol use: Yes     Comment: socially--\"not very often\" \"once a month\"     Drug use: No     Comment: patient denies any marijuana use     Sexual activity: Yes     Partners: Female     Birth control/protection: Condom   Other Topics Concern     Parent/sibling w/ CABG, MI or angioplasty before 65F 55M? No   Social History Narrative     Not on file     Social Determinants of Health     Financial Resource Strain: Not on file   Food Insecurity: Not on file   Transportation Needs: Not on file   Physical Activity: Not on file   Stress: Not on file   Social Connections: Not on file   Intimate Partner Violence: Not on file   Housing Stability: Not on file         FAMILY HISTORY:  Family History   Problem Relation Age of Onset     Anxiety Disorder Mother      Depression Mother      Ulcerative Colitis Mother 18     Breast Cancer Maternal Grandmother      Cerebrovascular Disease Maternal Grandmother      Breast Cancer Maternal Aunt      Cancer Maternal Grandfather         grt uncles " "colon cancer         PHYSICAL EXAM:  Vital signs:  /75   Pulse 86   Temp 97.4  F (36.3  C) (Tympanic)   Resp 18   Ht 1.791 m (5' 10.5\")   Wt (!) 174.4 kg (384 lb 6.4 oz)   SpO2 95%   BMI 54.38 kg/m       GENERAL/CONSTITUTIONAL: No acute distress.  EYES: Pupils are equal, round, and react to light and accommodation. Extraocular movements intact.  No scleral icterus.  ENT/MOUTH: Neck supple. Oropharynx clear, no mucositis.  LYMPH: No anterior cervical, posterior cervical, supraclavicular, axillary or inguinal adenopathy.   RESPIRATORY: Clear to auscultation bilaterally. No crackles or wheezing.   CARDIOVASCULAR: Regular rate and rhythm without murmurs, gallops, or rubs.  GASTROINTESTINAL: No hepatosplenomegaly, masses, or tenderness. The patient has normal bowel sounds. No guarding.  No distention.  MUSCULOSKELETAL: Warm and well-perfused, no cyanosis, clubbing, or edema.   NEUROLOGIC: Cranial nerves II-XII are intact. Alert, oriented, answers questions appropriately.  INTEGUMENTARY: No rashes or jaundice.  GAIT: Antalgic gait.      LABS:  CBC RESULTS:   Recent Labs   Lab Test 04/27/22  0842 04/22/22  1644   WBC 8.0 11.1*   RBC  --  4.58   HGB  --  12.9*   HCT  --  39.1*   MCV  --  85   MCH  --  28.2   MCHC  --  33.0   RDW  --  13.2   PLT  --  253       Recent Labs   Lab Test 05/03/22  0959 04/27/22  0842 04/22/22  1644     --  134   POTASSIUM 3.9 3.8 3.5   CHLORIDE 103  --  99   CO2 27  --  31   ANIONGAP 10  --  4   GLC 84  --  99   BUN 11  --  17   CR 0.83  --  0.96   FILEMON 9.7  --  9.2         PATHOLOGY:  Surgical Pathology Report                         Case: [id number]                                   Authorizing Provider:  Star Ojeda MD         Collected:           03/08/2022 11:21 AM           Ordering Location:     [facility]   Received:            03/08/2022 01:49 PM                                  Imaging                                                                   "     Pathologist:           Heather Sidhu MD                                                            Specimen:    Retroperitoneal, Retroperitoneal Biopsy                                                    Final Diagnosis   Retroperitoneal mass, CT-guided needle biopsy:  -Mature adipose tissue, consistent with lipomatous tumor.  Limited sample.  See COMMENT.   Electronically signed by Heather Sidhu MD on 3/14/2022 at  2:36 PM   Comment    The morphologic findings are concerning for liposarcoma.  The biopsy is limited in nature, comprising of needle biopsy cores of lipomatous tissue.  The biopsy tissue may not be representative of the radiographically identified large (reported 8.7 cm) mass.  Fluorescence in-situ hybridization (FISH) for MDM2 probe is pending at this time (please see separate forthcoming [facility] cytogenetics laboratory report for results).       INTERPRETATION:  None (0%) of the interphase cells examined had a signal pattern indicative of amplification of MDM2.      IMAGING:  CT CHEST WITHOUT CONTRAST March 23, 2022  FINDINGS:   Chest/mediastinum: No cardiomegaly. Small pericardial effusion. No  significant mediastinal, hilar or axillary lymphadenopathy. Asymmetric  elevation of the left hemidiaphragm as compared to the right.     Lung/pleura: Trace left pleural effusion. No significant right pleural  effusion. No significant pneumothorax. Significant interval  improvement in the previously seen right lower lobe consolidative  pulmonary opacities as compared to 2/19/2022 outside CT. Only small  patchy foci of opacity in the medial aspect of the right lower lobe is  seen.      Upper abdomen: Limited evaluation of the upper abdomen due to lack of  coverage and contrast. Cholelithiasis without CT evidence of acute  cholecystitis. There is approximately 8.7 x 7.9 x 9.3 cm (axial and CC  dimensions, respectively), fatty area with minimal fat stranding in  the left upper quadrant  retroperitoneal space just above the superior  pole of the kidney and abutting the adjacent adrenal gland, kidney,  pancreas and spleen. The spleen is enlarged measuring 15.5 cm in  craniocaudal dimension.     Bones and soft tissue: Multilevel degenerative changes of the spine.  Diffuse heterogenous appearance of the spine of indeterminate  etiology.                                                                      IMPRESSION:    1. Significant interval improvement in the previously seen right lower  lobe consolidative pulmonary opacity as compared to 2/19/2022 outside  CT. Only small patchy opacity in the medial aspect of the right lower  lobe remaining.  2. Small pericardial effusion.  3. Trace left pleural effusion.  4. There is approximately 8.7 x 7.9 x 9.3 cm fat attenuating area with  minimal fat stranding in the left upper quadrant, consistent with the  biopsy-proven lipomatous tumor.  5. Cholelithiasis without CT evidence of acute cholecystitis.  6. Splenomegaly.  7. Multilevel degenerative changes of the spine with diffuse  heterogenous appearance of the thoracic spine of indeterminate  Etiology.      ASSESSMENT/PLAN:  Kimo Greenwood is a 41 year old male who is referred for elevated WBC, anemia, splenomegaly. 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    1) Neutrophilia, anemia, splenomegaly  -Patient has evidence of chronic, intermittent neutrophilia and NC/NC anemia.  -Asymptomatic.  -On 4/27/22, total WBCs were 8.0.  -I discussed with the patient we are in need of repeat studies. We discussed possible underlying etiologies to include reactive, inflammatory, but with the involvement of two cell lines, there is a possibility of an underlying lymphoproliferative disorder.  -Check CBC, CMP, peripheral smear, LDH, iron studies, B12/folate.   -I briefly  discussed the possibility of a bone marrow biopsy, but will hold for now.     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  -Due for repeat scans and follow up with Dr. Ojeda in 9/2022.    3) History of MDD    4) History of HTN, LOREN    5) Follow up in 1 month. Will determine further workup based on repeat labs above.     Thank you for referring Willard Greenwood to clinic. Adequate time was dedicated to patient questions and answered to the expressed satisfaction of the patient and family members.         Danna Amato DO  Hematology/Oncology  [facility]        Again, thank you for allowing me to participate in the care of your patient.        Sincerely,        Danna Amato DO

## 2022-07-21 NOTE — PROGRESS NOTES
[facility]    Hematology/Oncology New Patient Note      Today's Date: 7/21/22    Reason for Consultation: Elevated WBC  Referring Provider: Kory Hudson MD      HISTORY OF PRESENT ILLNESS: Kimo Greenwood is a 41 year old male who is referred for elevated WBC, anemia, splenomegaly. 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    Patient has evidence of chronic, intermittent leukocytosis since 2012. Specifically, he has had neutrophilia. On 4/22/22, WBC was 11.1, ANC 9.7. On 4/27/22, WBC was 8.0, ANC 4.8. Patient also has evidence of chronic, intermittent NCNC anemia. Record review shows gillian of 9.1 in 2017. Patient and mother unable to recall the events of that timeframe.     Of note, patient was hospitalized for pneumonia earlier this year. Per record review, an incidental mass was noted an 8.7 cm lipomatous mass in the retroperitoneum near the kidney and spleen. He was evaluated by surgical oncology, Dr. Ojeda, with biopsy on 3/8/22 returning as lipomatous tumor/liposarcoma, negative for MDM2. He is due for follow up imaging in September 2022 with follow in surg onc clinic.    A repeat CT chest was done on 3/23/22, which returned with significant improvement RLL pneumonia. It continued to show the 8.7 x 7.9 x 9.3 cm RP mass in the LUQ. The spleen was enlarged to 15.5 cm. No other adenopathy.     Patient is present with mother. They report only new medication is liraglutide for weight management. No weight loss, fever, drenching night sweats, or LAD.     Of note, patient lives at [facility]. He is able to perform ADLs. He does smoke cigarettes.       REVIEW OF SYSTEMS:   A 14 point ROS was reviewed with pertinent positives and negatives in the HPI.        HOME MEDICATIONS:  Current Outpatient Medications   Medication Sig Dispense Refill      albuterol (VENTOLIN HFA) 108 (90 Base) MCG/ACT inhaler INHALE 2 PUFFS INTO LUNGS EVERY SIX HOURS AS NEEDED FOR SHORTNESS OF BREATH / DYSPNEA OR WHEEZING 18 g 1     DULoxetine (CYMBALTA) 60 MG capsule Take 120 mg by mouth daily        FIBER- MG tablet TAKE 2 TABLETS BY MOUTH TWICE A DAY (Patient taking differently: Take 2 tablets by mouth 2 times daily) 360 tablet 3     hydrochlorothiazide (HYDRODIURIL) 25 MG tablet Take 1 tablet (25 mg) by mouth daily 90 tablet 3     insulin pen needle (31G X 6 MM) 31G X 6 MM miscellaneous Use 1 pen needles daily or as directed. 100 each 0     Lidocaine (LIDOCARE) 4 % Patch Place 1-2 patches onto the skin every 24 hours To prevent lidocaine toxicity, patient should be patch free for 12 hrs daily. 60 patch 1     liraglutide - Weight Management (SAXENDA) 18 MG/3ML pen Inject 0.6 mg Subcutaneous daily for 7 days, THEN 1.2 mg daily for 7 days, THEN 1.8 mg daily for 7 days, THEN 2.4 mg daily for 7 days, THEN 3 mg daily. 27 mL 1     liraglutide - Weight Management (SAXENDA) 18 MG/3ML pen Inject 3 mg subcutaneously daily. 15 mL 3     lisinopril (ZESTRIL) 10 MG tablet TAKE 1 TABLET BY MOUTH EVERY MORNING 28 tablet 3     mirabegron (MYRBETRIQ) 50 MG 24 hr tablet Take 1 tablet by mouth daily       naproxen sodium 220 MG capsule If Tylenol is not helpful, this can be added 2 tabs twice a day with meals. (Patient taking differently: Take 440 mg by mouth 2 times daily (with meals)) 60 capsule 3     OLANZapine-samidorphan (LYBALVI) 10-10 MG TABS tablet Take 1 tablet by mouth At Bedtime       order for DME Equipment being ordered: CPAP supplies 1 each 0     oxybutynin ER (DITROPAN XL) 15 MG 24 hr tablet Take 30 mg by mouth daily       pantoprazole (PROTONIX) 40 MG EC tablet TAKE 1 TABLET BY MOUTH 30-60 MINUTES BEFORE FIRST MEAL OF THE DAY 28 tablet 3     prazosin (MINIPRESS) 1 MG capsule Take 3 mg by mouth At Bedtime       VITAMIN D3 50 MCG (2000 UT) tablet TAKE ONE TABLET BY MOUTH EVERY  DAY 90 tablet 3     OLANZapine (ZYPREXA) 10 MG tablet Take 1 tablet (10 mg) by mouth At Bedtime (Patient not taking: Reported on 7/21/2022) 30 tablet 0         ALLERGIES:  Allergies   Allergen Reactions     Other [No Clinical Screening - See Comments] Other (See Comments)     Awoke from anesthesia with anger and ripping off dressing, after interstim placement, outside hospital 2013         PAST MEDICAL HISTORY:  Past Medical History:   Diagnosis Date     Arthritis     DDD hips and knees     Asthma      Asthma      Patel's esophagus      Chronic pain      Chronic pain - left hip/leg pain     degenerative disc disease, back, hips, knees     Gastroesophageal reflux disease      History of anesthesia complications     agitation x1 after interstim 2013     Hypertension      Hypertension      Leukocytosis      LPRD (laryngopharyngeal reflux disease)      Marijuana abuse      Marijuana abuse      MDD (major depressive disorder)      MDD (major depressive disorder)      Mental retardation, mild (I.Q. 50-70)      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    With associated speech/language delay     Obesity 11/11/2010     LOREN (obstructive sleep apnea)     Uses a CPAP     LOREN (obstructive sleep apnea)      Seborrheic dermatitis      Seborrheic dermatitis      Sleep apnea     CPAP         PAST SURGICAL HISTORY:  Past Surgical History:   Procedure Laterality Date     ARTHROPLASTY HIP Left 1/25/2017    Procedure: ARTHROPLASTY HIP;  Surgeon: Bala Randall MD;  Location: [facility] OR     ARTHROPLASTY HIP Left      ARTHROPLASTY REVISION HIP Left 6/24/2019    Procedure: Revision left total hip arthroplasty with a head and liner exchange to a Biomet dual mobility head and liner.;  Surgeon: Bala Randall MD;  Location: [facility] OR     BLADDER SURGERY      Ibarra, [facility],Interstem stimulator implant     CLOSED REDUCTION HIP Left 6/10/2019    Procedure: Closed reduction under general anesthesia left recurrent  prosthetic hip dislocation;  Surgeon: Rafat Cazares MD;  Location: [facility] OR     COLONOSCOPY N/A 10/30/2015    Procedure: COMBINED COLONOSCOPY, SINGLE OR MULTIPLE BIOPSY/POLYPECTOMY BY BIOPSY;  Surgeon: Alexis Jackson MD;  Location: [facility] GI     COLONOSCOPY N/A 11/17/2016    Procedure: COMBINED COLONOSCOPY, SINGLE OR MULTIPLE BIOPSY/POLYPECTOMY BY BIOPSY;  Surgeon: Cat Huber MD;  [facility]     COLONOSCOPY N/A 10/28/2020    Procedure: COLONOSCOPY;  Surgeon: You Kraus MD;  Location: [facility] OR     COLONOSCOPY       ESOPHAGOSCOPY, GASTROSCOPY, DUODENOSCOPY (EGD), COMBINED N/A 11/17/2016    Procedure: COMBINED ESOPHAGOSCOPY, GASTROSCOPY, DUODENOSCOPY (EGD), BIOPSY SINGLE OR MULTIPLE;  Surgeon: Cat Huber MD;  Location: [facility]     ESOPHAGOSCOPY, GASTROSCOPY, DUODENOSCOPY (EGD), COMBINED N/A 3/12/2020    Procedure: ESOPHAGOGASTRODUODENOSCOPY WITH BIOPSIES;  Surgeon: You Kraus MD;  Location: [facility]     ESOPHAGOSCOPY, GASTROSCOPY, DUODENOSCOPY (EGD), COMBINED N/A 10/28/2020    Procedure: ESOPHAGOGASTRODUODENOSCOPY, WITH BIOPSY;  Surgeon: You Kraus MD;  Location: [facility] OR     ESOPHAGOSCOPY, GASTROSCOPY, DUODENOSCOPY (EGD), COMBINED       EXAM UNDER ANESTHESIA, FULGURATE CONDYLOMA ANUS, COMBINED N/A 12/22/2016    Procedure: COMBINED EXAM UNDER ANESTHESIA, FULGURATE CONDYLOMA ANUS;  Surgeon: Nya Cabello MD;  [facility]     GENITOURINARY SURGERY       JOINT REPLACEMENT Left 2017    MARGO, Revision Left MARGO     OTHER SURGICAL HISTORY      interstim stimulator implant     DC CYSTOURETHROSCOPY INJ CHEMODENERVATION BLADDER N/A 1/16/2019    Procedure: CYSTOSCOPY BOTOX BLADDER INJECTIONS (100 UNITS IN 10CC);  Surgeon: Didier Ibarra MD;  Location: [facility];  Service: Urology     DC IMPLANT PERIPH/GASTRIC NEUROSTIM/ N/A 1/8/2020    Procedure: NEW INTERSTIM LEAD, REPLACE OLD; NEW INTERSTIM BATTERY, REPLACE OLD;  Surgeon: Didier Ibarra MD;   "Location: [facility] OR;  Service: Urology         SOCIAL HISTORY:  Social History     Socioeconomic History     Marital status: Single     Spouse name: Not on file     Number of children: Not on file     Years of education: Not on file     Highest education level: Not on file   Occupational History     Not on file   Tobacco Use     Smoking status: Current Every Day Smoker     Packs/day: 1.00     Years: 1.00     Pack years: 1.00     Types: Vaping Device     Smokeless tobacco: Current User     Types: Chew     Tobacco comment: Smokes E Cigs equal to 1 PPD   Vaping Use     Vaping Use: Every day     Substances: Nicotine, Flavoring     Devices: Refillable tank   Substance and Sexual Activity     Alcohol use: Yes     Comment: socially--\"not very often\" \"once a month\"     Drug use: No     Comment: patient denies any marijuana use     Sexual activity: Yes     Partners: Female     Birth control/protection: Condom   Other Topics Concern     Parent/sibling w/ CABG, MI or angioplasty before 65F 55M? No   Social History Narrative     Not on file     Social Determinants of Health     Financial Resource Strain: Not on file   Food Insecurity: Not on file   Transportation Needs: Not on file   Physical Activity: Not on file   Stress: Not on file   Social Connections: Not on file   Intimate Partner Violence: Not on file   Housing Stability: Not on file         FAMILY HISTORY:  Family History   Problem Relation Age of Onset     Anxiety Disorder Mother      Depression Mother      Ulcerative Colitis Mother 18     Breast Cancer Maternal Grandmother      Cerebrovascular Disease Maternal Grandmother      Breast Cancer Maternal Aunt      Cancer Maternal Grandfather         grt uncles colon cancer         PHYSICAL EXAM:  Vital signs:  /75   Pulse 86   Temp 97.4  F (36.3  C) (Tympanic)   Resp 18   Ht 1.791 m (5' 10.5\")   Wt (!) 174.4 kg (384 lb 6.4 oz)   SpO2 95%   BMI 54.38 kg/m       GENERAL/CONSTITUTIONAL: No acute " distress.  EYES: Pupils are equal, round, and react to light and accommodation. Extraocular movements intact.  No scleral icterus.  ENT/MOUTH: Neck supple. Oropharynx clear, no mucositis.  LYMPH: No anterior cervical, posterior cervical, supraclavicular, axillary or inguinal adenopathy.   RESPIRATORY: Clear to auscultation bilaterally. No crackles or wheezing.   CARDIOVASCULAR: Regular rate and rhythm without murmurs, gallops, or rubs.  GASTROINTESTINAL: No hepatosplenomegaly, masses, or tenderness. The patient has normal bowel sounds. No guarding.  No distention.  MUSCULOSKELETAL: Warm and well-perfused, no cyanosis, clubbing, or edema.   NEUROLOGIC: Cranial nerves II-XII are intact. Alert, oriented, answers questions appropriately.  INTEGUMENTARY: No rashes or jaundice.  GAIT: Antalgic gait.      LABS:  CBC RESULTS:   Recent Labs   Lab Test 04/27/22  0842 04/22/22  1644   WBC 8.0 11.1*   RBC  --  4.58   HGB  --  12.9*   HCT  --  39.1*   MCV  --  85   MCH  --  28.2   MCHC  --  33.0   RDW  --  13.2   PLT  --  253       Recent Labs   Lab Test 05/03/22  0959 04/27/22  0842 04/22/22  1644     --  134   POTASSIUM 3.9 3.8 3.5   CHLORIDE 103  --  99   CO2 27  --  31   ANIONGAP 10  --  4   GLC 84  --  99   BUN 11  --  17   CR 0.83  --  0.96   FILEMON 9.7  --  9.2         PATHOLOGY:  Surgical Pathology Report                         Case: [id number]                                   Authorizing Provider:  Star Ojeda MD         Collected:           03/08/2022 11:21 AM           Ordering Location:     [facility]   Received:            03/08/2022 01:49 PM                                  Imaging                                                                       Pathologist:           Heather Sidhu MD                                                            Specimen:    Retroperitoneal, Retroperitoneal Biopsy                                                    Final Diagnosis   Retroperitoneal mass,  CT-guided needle biopsy:  -Mature adipose tissue, consistent with lipomatous tumor.  Limited sample.  See COMMENT.   Electronically signed by Heather Sidhu MD on 3/14/2022 at  2:36 PM   Comment    The morphologic findings are concerning for liposarcoma.  The biopsy is limited in nature, comprising of needle biopsy cores of lipomatous tissue.  The biopsy tissue may not be representative of the radiographically identified large (reported 8.7 cm) mass.  Fluorescence in-situ hybridization (FISH) for MDM2 probe is pending at this time (please see separate forthcoming [facility] cytogenetics laboratory report for results).       INTERPRETATION:  None (0%) of the interphase cells examined had a signal pattern indicative of amplification of MDM2.      IMAGING:  CT CHEST WITHOUT CONTRAST March 23, 2022  FINDINGS:   Chest/mediastinum: No cardiomegaly. Small pericardial effusion. No  significant mediastinal, hilar or axillary lymphadenopathy. Asymmetric  elevation of the left hemidiaphragm as compared to the right.     Lung/pleura: Trace left pleural effusion. No significant right pleural  effusion. No significant pneumothorax. Significant interval  improvement in the previously seen right lower lobe consolidative  pulmonary opacities as compared to 2/19/2022 outside CT. Only small  patchy foci of opacity in the medial aspect of the right lower lobe is  seen.      Upper abdomen: Limited evaluation of the upper abdomen due to lack of  coverage and contrast. Cholelithiasis without CT evidence of acute  cholecystitis. There is approximately 8.7 x 7.9 x 9.3 cm (axial and CC  dimensions, respectively), fatty area with minimal fat stranding in  the left upper quadrant retroperitoneal space just above the superior  pole of the kidney and abutting the adjacent adrenal gland, kidney,  pancreas and spleen. The spleen is enlarged measuring 15.5 cm in  craniocaudal dimension.     Bones and soft tissue: Multilevel  degenerative changes of the spine.  Diffuse heterogenous appearance of the spine of indeterminate  etiology.                                                                      IMPRESSION:    1. Significant interval improvement in the previously seen right lower  lobe consolidative pulmonary opacity as compared to 2/19/2022 outside  CT. Only small patchy opacity in the medial aspect of the right lower  lobe remaining.  2. Small pericardial effusion.  3. Trace left pleural effusion.  4. There is approximately 8.7 x 7.9 x 9.3 cm fat attenuating area with  minimal fat stranding in the left upper quadrant, consistent with the  biopsy-proven lipomatous tumor.  5. Cholelithiasis without CT evidence of acute cholecystitis.  6. Splenomegaly.  7. Multilevel degenerative changes of the spine with diffuse  heterogenous appearance of the thoracic spine of indeterminate  Etiology.      ASSESSMENT/PLAN:  Kimo Greenwood is a 41 year old male who is referred for elevated WBC, anemia, splenomegaly. 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    1) Neutrophilia, anemia, splenomegaly  -Patient has evidence of chronic, intermittent neutrophilia and NC/NC anemia.  -Asymptomatic.  -On 4/27/22, total WBCs were 8.0.  -I discussed with the patient we are in need of repeat studies. We discussed possible underlying etiologies to include reactive, inflammatory, but with the involvement of two cell lines, there is a possibility of an underlying lymphoproliferative disorder.  -Check CBC, CMP, peripheral smear, LDH, iron studies, B12/folate.   -I briefly discussed the possibility of a bone marrow biopsy, but will hold for now.     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  -Due for repeat scans and follow up with  Dr. Ojeda in 9/2022.    3) History of MDD    4) History of HTN, LOREN    5) Follow up in 1 month. Will determine further workup based on repeat labs above.     Thank you for referring Willard Greenwood to clinic. Adequate time was dedicated to patient questions and answered to the expressed satisfaction of the patient and family members.         Danna Amato DO  Hematology/Oncology  [facility]

## 2022-07-22 NOTE — PATIENT INSTRUCTIONS
Willard is scheduled for a follow up with Dr. Amato on 08/25/22 at 8:00 am.    Chloe Davenport RN on 7/22/2022 at 4:34 PM

## 2022-08-24 NOTE — PROGRESS NOTES
[facility]    Hematology/Oncology Established Patient Note      Today's Date: 8/24/22    Reason for Consultation: Elevated WBC  Referring Provider: Kory Hudson MD      HISTORY OF PRESENT ILLNESS: Kimo Greenwood is a 41 year old male who is referred for elevated WBC, anemia, splenomegaly. 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    Patient has evidence of chronic, intermittent leukocytosis since 2012. Specifically, he has had neutrophilia. On 4/22/22, WBC was 11.1, ANC 9.7. On 4/27/22, WBC was 8.0, ANC 4.8. Patient also has evidence of chronic, intermittent NCNC anemia. Record review shows gillian of 9.1 in 2017. Patient and mother unable to recall the events of that timeframe.     Of note, patient was hospitalized for pneumonia earlier this year. Per record review, an incidental mass was noted an 8.7 cm lipomatous mass in the retroperitoneum near the kidney and spleen. He was evaluated by surgical oncology, Dr. Ojeda, with biopsy on 3/8/22 returning as lipomatous tumor/liposarcoma, negative for MDM2. He is due for follow up imaging in September 2022 with follow in surg onc clinic.    A repeat CT chest was done on 3/23/22, which returned with significant improvement RLL pneumonia. It continued to show the 8.7 x 7.9 x 9.3 cm RP mass in the LUQ. The spleen was enlarged to 15.5 cm. No other adenopathy.     Patient is present with mother. They report only new medication is liraglutide for weight management. No weight loss, fever, drenching night sweats, or LAD.     Of note, patient lives at [facility]. He is able to perform ADLs. He does smoke cigarettes.       INTERIM HISTORY:  No acute events since our last visit. No B symptoms. He will follow up with Dr. Ojeda in September 2022.      REVIEW OF SYSTEMS:   A 14 point ROS was reviewed with  pertinent positives and negatives in the HPI.        HOME MEDICATIONS:  Current Outpatient Medications   Medication Sig Dispense Refill     albuterol (VENTOLIN HFA) 108 (90 Base) MCG/ACT inhaler INHALE 2 PUFFS INTO LUNGS EVERY SIX HOURS AS NEEDED FOR SHORTNESS OF BREATH / DYSPNEA OR WHEEZING 18 g 1     DULoxetine (CYMBALTA) 60 MG capsule Take 120 mg by mouth daily        FIBER- MG tablet TAKE 2 TABLETS BY MOUTH TWICE A DAY (Patient taking differently: Take 2 tablets by mouth 2 times daily) 360 tablet 3     hydrochlorothiazide (HYDRODIURIL) 25 MG tablet Take 1 tablet (25 mg) by mouth daily 90 tablet 3     insulin pen needle (31G X 6 MM) 31G X 6 MM miscellaneous Use 1 pen needles daily or as directed. 100 each 0     Lidocaine (LIDOCARE) 4 % Patch Place 1-2 patches onto the skin every 24 hours To prevent lidocaine toxicity, patient should be patch free for 12 hrs daily. 60 patch 1     liraglutide - Weight Management (SAXENDA) 18 MG/3ML pen Inject 0.6 mg Subcutaneous daily for 7 days, THEN 1.2 mg daily for 7 days, THEN 1.8 mg daily for 7 days, THEN 2.4 mg daily for 7 days, THEN 3 mg daily. 27 mL 1     liraglutide - Weight Management (SAXENDA) 18 MG/3ML pen Inject 3 mg subcutaneously daily. 15 mL 3     lisinopril (ZESTRIL) 10 MG tablet TAKE 1 TABLET BY MOUTH EVERY MORNING 28 tablet 3     mirabegron (MYRBETRIQ) 50 MG 24 hr tablet Take 1 tablet by mouth daily       naproxen sodium 220 MG capsule If Tylenol is not helpful, this can be added 2 tabs twice a day with meals. (Patient taking differently: Take 440 mg by mouth 2 times daily (with meals)) 60 capsule 3     OLANZapine (ZYPREXA) 10 MG tablet Take 1 tablet (10 mg) by mouth At Bedtime (Patient not taking: Reported on 7/21/2022) 30 tablet 0     OLANZapine-samidorphan (LYBALVI) 10-10 MG TABS tablet Take 1 tablet by mouth At Bedtime       order for DME Equipment being ordered: CPAP supplies 1 each 0     oxybutynin ER (DITROPAN XL) 15 MG 24 hr tablet Take 30 mg by  mouth daily       pantoprazole (PROTONIX) 40 MG EC tablet TAKE 1 TABLET BY MOUTH 30-60 MINUTES BEFORE FIRST MEAL OF THE DAY 28 tablet 3     prazosin (MINIPRESS) 1 MG capsule Take 3 mg by mouth At Bedtime       VITAMIN D3 50 MCG (2000 UT) tablet TAKE ONE TABLET BY MOUTH EVERY DAY 90 tablet 3         ALLERGIES:  Allergies   Allergen Reactions     Other [No Clinical Screening - See Comments] Other (See Comments)     Awoke from anesthesia with anger and ripping off dressing, after interstim placement, outside hospital 2013         PAST MEDICAL HISTORY:  Past Medical History:   Diagnosis Date     Arthritis     DDD hips and knees     Asthma      Asthma      Patel's esophagus      Chronic pain      Chronic pain - left hip/leg pain     degenerative disc disease, back, hips, knees     Gastroesophageal reflux disease      History of anesthesia complications     agitation x1 after interstim 2013     Hypertension      Hypertension      Leukocytosis      LPRD (laryngopharyngeal reflux disease)      Marijuana abuse      Marijuana abuse      MDD (major depressive disorder)      MDD (major depressive disorder)      Mental retardation, mild (I.Q. 50-70)      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    With associated speech/language delay     Obesity 11/11/2010     LOREN (obstructive sleep apnea)     Uses a CPAP     LOREN (obstructive sleep apnea)      Seborrheic dermatitis      Seborrheic dermatitis      Sleep apnea     CPAP         PAST SURGICAL HISTORY:  Past Surgical History:   Procedure Laterality Date     ARTHROPLASTY HIP Left 1/25/2017    Procedure: ARTHROPLASTY HIP;  Surgeon: Bala Randall MD;  Location: [facility] OR     ARTHROPLASTY HIP Left      ARTHROPLASTY REVISION HIP Left 6/24/2019    Procedure: Revision left total hip arthroplasty with a head and liner exchange to a Biomet dual mobility head and liner.;  Surgeon: Bala Randall MD;  Location: [facility] OR     BLADDER SURGERY      [facility],Interstem stimulator implant     CLOSED REDUCTION HIP Left 6/10/2019    Procedure: Closed reduction under general anesthesia left recurrent prosthetic hip dislocation;  Surgeon: Rafat Cazares MD;  Location: [facility] OR     COLONOSCOPY N/A 10/30/2015    Procedure: COMBINED COLONOSCOPY, SINGLE OR MULTIPLE BIOPSY/POLYPECTOMY BY BIOPSY;  Surgeon: Alexis Jackson MD;  Location: [facility] GI     COLONOSCOPY N/A 11/17/2016    Procedure: COMBINED COLONOSCOPY, SINGLE OR MULTIPLE BIOPSY/POLYPECTOMY BY BIOPSY;  Surgeon: Cat Huber MD;  Location: [facility]     COLONOSCOPY N/A 10/28/2020    Procedure: COLONOSCOPY;  Surgeon: You Kraus MD;  Location: [facility] OR     COLONOSCOPY       ESOPHAGOSCOPY, GASTROSCOPY, DUODENOSCOPY (EGD), COMBINED N/A 11/17/2016    Procedure: COMBINED ESOPHAGOSCOPY, GASTROSCOPY, DUODENOSCOPY (EGD), BIOPSY SINGLE OR MULTIPLE;  Surgeon: Cat Huber MD;  Location: [facility]     ESOPHAGOSCOPY, GASTROSCOPY, DUODENOSCOPY (EGD), COMBINED N/A 3/12/2020    Procedure: ESOPHAGOGASTRODUODENOSCOPY WITH BIOPSIES;  Surgeon: You Kraus MD;  Location: [facility] OR     ESOPHAGOSCOPY, GASTROSCOPY, DUODENOSCOPY (EGD), COMBINED N/A 10/28/2020    Procedure: ESOPHAGOGASTRODUODENOSCOPY, WITH BIOPSY;  Surgeon: You Kraus MD;  Location: [facility] OR     ESOPHAGOSCOPY, GASTROSCOPY, DUODENOSCOPY (EGD), COMBINED       EXAM UNDER ANESTHESIA, FULGURATE CONDYLOMA ANUS, COMBINED N/A 12/22/2016    Procedure: COMBINED EXAM UNDER ANESTHESIA, FULGURATE CONDYLOMA ANUS;  Surgeon: Nya Cabello MD;  Location: [facility] OR     GENITOURINARY SURGERY       JOINT REPLACEMENT Left 2017    MARGO, Revision Left MARGO     OTHER SURGICAL HISTORY      interstim stimulator implant     CO CYSTOURETHROSCOPY INJ CHEMODENERVATION BLADDER N/A 1/16/2019    Procedure: CYSTOSCOPY BOTOX BLADDER INJECTIONS (100 UNITS IN 10CC);  Surgeon: Didier Ibarra MD;  Location: [facility] Main OR;  Service: Urology     CO IMPLANT  "PERIPH/GASTRIC NEUROSTIM/ N/A 1/8/2020    Procedure: NEW INTERSTIM LEAD, REPLACE OLD; NEW INTERSTIM BATTERY, REPLACE OLD;  Surgeon: Didier Ibarra MD;  Location: [facility] Main OR;  Service: Urology         SOCIAL HISTORY:  Social History     Socioeconomic History     Marital status: Single     Spouse name: Not on file     Number of children: Not on file     Years of education: Not on file     Highest education level: Not on file   Occupational History     Not on file   Tobacco Use     Smoking status: Current Every Day Smoker     Packs/day: 1.00     Years: 1.00     Pack years: 1.00     Types: Vaping Device     Smokeless tobacco: Current User     Types: Chew     Tobacco comment: Smokes E Cigs equal to 1 PPD   Vaping Use     Vaping Use: Every day     Substances: Nicotine, Flavoring     Devices: Refillable tank   Substance and Sexual Activity     Alcohol use: Yes     Comment: socially--\"not very often\" \"once a month\"     Drug use: No     Comment: patient denies any marijuana use     Sexual activity: Yes     Partners: Female     Birth control/protection: Condom   Other Topics Concern     Parent/sibling w/ CABG, MI or angioplasty before 65F 55M? No   Social History Narrative     Not on file     Social Determinants of Health     Financial Resource Strain: Not on file   Food Insecurity: Not on file   Transportation Needs: Not on file   Physical Activity: Not on file   Stress: Not on file   Social Connections: Not on file   Intimate Partner Violence: Not on file   Housing Stability: Not on file         FAMILY HISTORY:  Family History   Problem Relation Age of Onset     Anxiety Disorder Mother      Depression Mother      Ulcerative Colitis Mother 18     Breast Cancer Maternal Grandmother      Cerebrovascular Disease Maternal Grandmother      Breast Cancer Maternal Aunt      Cancer Maternal Grandfather         grt uncles colon cancer         PHYSICAL EXAM:  Vital signs:  /76   Pulse 80   Temp 97  F (36.1 " " C) (Tympanic)   Resp 16   Ht 1.791 m (5' 10.5\")   Wt (!) 173.7 kg (382 lb 14.4 oz)   SpO2 97%   BMI 54.16 kg/m       GENERAL/CONSTITUTIONAL: No acute distress.  LYMPH: No anterior cervical, posterior cervical, supraclavicular, axillary or inguinal adenopathy.   RESPIRATORY: Clear to auscultation bilaterally. No crackles or wheezing.   CARDIOVASCULAR: Regular rate and rhythm without murmurs, gallops, or rubs.  GASTROINTESTINAL: No hepatosplenomegaly, masses, or tenderness. The patient has normal bowel sounds. No guarding.  No distention.  MUSCULOSKELETAL: Warm and well-perfused, no cyanosis, clubbing, or edema.   NEUROLOGIC: Cranial nerves II-XII are intact. Alert, oriented, answers questions appropriately.  INTEGUMENTARY: No rashes or jaundice.  GAIT: Antalgic gait.      LABS:   Latest Reference Range & Units 07/21/22 09:50   Sodium 133 - 144 mmol/L 135   Potassium 3.4 - 5.3 mmol/L 3.5   Chloride 94 - 109 mmol/L 103   Carbon Dioxide 20 - 32 mmol/L 26   Urea Nitrogen 7 - 30 mg/dL 14   Creatinine 0.66 - 1.25 mg/dL 0.71   GFR Estimate >60 mL/min/1.73m2 >90   Calcium 8.5 - 10.1 mg/dL 9.4   Anion Gap 3 - 14 mmol/L 6   Albumin 3.4 - 5.0 g/dL 3.4   Protein Total 6.8 - 8.8 g/dL 7.6   Alkaline Phosphatase 40 - 150 U/L 104   ALT 0 - 70 U/L 30   AST 0 - 45 U/L 8   Bilirubin Total 0.2 - 1.3 mg/dL 0.3   Ferritin 26 - 388 ng/mL 107   Folate 4.6 - 34.8 ng/mL 15.6   Iron 35 - 180 ug/dL 112   Iron Binding Cap 240 - 430 ug/dL 278   Iron Saturation Index 15 - 46 % 40   Lactate Dehydrogenase 85 - 227 U/L 195   TSH 0.40 - 4.00 mU/L 0.67   Vitamin B12 232 - 1,245 pg/mL 358   Glucose 70 - 99 mg/dL 101 (H)   WBC 4.0 - 11.0 10e3/uL 18.4 (H)   Hemoglobin 13.3 - 17.7 g/dL 12.4 (L)   Hematocrit 40.0 - 53.0 % 37.7 (L)   Platelet Count 150 - 450 10e3/uL 265   RBC Count 4.40 - 5.90 10e6/uL 4.33 (L)   MCV 78 - 100 fL 87   MCH 26.5 - 33.0 pg 28.6   MCHC 31.5 - 36.5 g/dL 32.9   RDW 10.0 - 15.0 % 13.0   % Neutrophils % 89   % Lymphocytes % 6 "   % Monocytes % 4   % Eosinophils % 0   % Basophils % 0   Absolute Basophils 0.0 - 0.2 10e3/uL 0.0   Absolute Eosinophils 0.0 - 0.7 10e3/uL 0.0   Absolute Immature Granulocytes <=0.4 10e3/uL 0.1   Absolute Lymphocytes 0.8 - 5.3 10e3/uL 1.1   Absolute Monocytes 0.0 - 1.3 10e3/uL 0.8   % Immature Granulocytes % 1   Absolute Neutrophils 1.6 - 8.3 10e3/uL 16.4 (H)   Absolute NRBCs 10e3/uL 0.0   NRBCs per 100 WBC <1 /100 0   % Retic 0.5 - 2.0 % 1.7   Absolute Retic 0.025 - 0.095 10e6/uL 0.075       PATHOLOGY:  Surgical Pathology Report                         Case: [id number]                                   Authorizing Provider:  Star Ojeda MD         Collected:           03/08/2022 11:21 AM           Ordering Location:     [facility]   Received:            03/08/2022 01:49 PM                                  Imaging                                                                       Pathologist:           Heather Sidhu MD                                                            Specimen:    Retroperitoneal, Retroperitoneal Biopsy                                                    Final Diagnosis   Retroperitoneal mass, CT-guided needle biopsy:  -Mature adipose tissue, consistent with lipomatous tumor.  Limited sample.  See COMMENT.   Electronically signed by Heathre Sidhu MD on 3/14/2022 at  2:36 PM   Comment    The morphologic findings are concerning for liposarcoma.  The biopsy is limited in nature, comprising of needle biopsy cores of lipomatous tissue.  The biopsy tissue may not be representative of the radiographically identified large (reported 8.7 cm) mass.  Fluorescence in-situ hybridization (FISH) for MDM2 probe is pending at this time (please see separate forthcoming [facility] cytogenetics laboratory report for results).       INTERPRETATION:  None (0%) of the interphase cells examined had a signal pattern indicative of amplification of MDM2.      Final Diagnosis  7/21/22:   Peripheral blood for morphology:  -Mild normochromic, normocytic anemia without evidence of red cell regeneration  -Leukocytosis with mature neutrophilia; leukocytes without morphologic abnormalities         IMAGING:  CT CHEST WITHOUT CONTRAST March 23, 2022  FINDINGS:   Chest/mediastinum: No cardiomegaly. Small pericardial effusion. No  significant mediastinal, hilar or axillary lymphadenopathy. Asymmetric  elevation of the left hemidiaphragm as compared to the right.     Lung/pleura: Trace left pleural effusion. No significant right pleural  effusion. No significant pneumothorax. Significant interval  improvement in the previously seen right lower lobe consolidative  pulmonary opacities as compared to 2/19/2022 outside CT. Only small  patchy foci of opacity in the medial aspect of the right lower lobe is  seen.      Upper abdomen: Limited evaluation of the upper abdomen due to lack of  coverage and contrast. Cholelithiasis without CT evidence of acute  cholecystitis. There is approximately 8.7 x 7.9 x 9.3 cm (axial and CC  dimensions, respectively), fatty area with minimal fat stranding in  the left upper quadrant retroperitoneal space just above the superior  pole of the kidney and abutting the adjacent adrenal gland, kidney,  pancreas and spleen. The spleen is enlarged measuring 15.5 cm in  craniocaudal dimension.     Bones and soft tissue: Multilevel degenerative changes of the spine.  Diffuse heterogenous appearance of the spine of indeterminate  etiology.                                                                      IMPRESSION:    1. Significant interval improvement in the previously seen right lower  lobe consolidative pulmonary opacity as compared to 2/19/2022 outside  CT. Only small patchy opacity in the medial aspect of the right lower  lobe remaining.  2. Small pericardial effusion.  3. Trace left pleural effusion.  4. There is approximately 8.7 x 7.9 x 9.3 cm fat attenuating area  with  minimal fat stranding in the left upper quadrant, consistent with the  biopsy-proven lipomatous tumor.  5. Cholelithiasis without CT evidence of acute cholecystitis.  6. Splenomegaly.  7. Multilevel degenerative changes of the spine with diffuse  heterogenous appearance of the thoracic spine of indeterminate  Etiology.      ASSESSMENT/PLAN:  Kimo Greenwood is a 41 year old male who is referred for elevated WBC, anemia, splenomegaly. 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    1) Neutrophilia, anemia, splenomegaly  -Patient has evidence of chronic, intermittent neutrophilia and NC/NC anemia.  -Asymptomatic.  -On 4/27/22, total WBCs were 8.0.  -I discussed with the patient we are in need of repeat studies. We discussed possible underlying etiologies to include reactive, inflammatory, but with the involvement of two cell lines, there is a possibility of an underlying lymphoproliferative disorder.  -Repeat CBC in July 2022 with persistent neutrophilia and NC/NC anemia. Negative for dysplasia or evidence of blasts.  -Iron studies, B12, folate, TSH WNL in July 2022.  -Repeat CBC and check FLOW, myeloid NGS on peripheral blood. Patient declines bone marrow biopsy at this time.     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  -Due for repeat scans and follow up with Dr. Ojeda in 9/2022.    3) History of MDD    4) History of HTN, LOREN    5) Follow up in 1 month. Will determine further workup based on repeat labs above.       Danna Amato,   Hematology/Oncology  [facility]

## 2022-08-25 ENCOUNTER — OFFICE VISIT (OUTPATIENT)
Dept: FAMILY MEDICINE | Facility: CLINIC | Age: 42
End: 2022-08-25

## 2022-08-25 ENCOUNTER — ONCOLOGY VISIT (OUTPATIENT)
Dept: ONCOLOGY | Facility: CLINIC | Age: 42
End: 2022-08-25
Attending: INTERNAL MEDICINE
Payer: COMMERCIAL

## 2022-08-25 VITALS
OXYGEN SATURATION: 97 % | HEIGHT: 71 IN | RESPIRATION RATE: 16 BRPM | BODY MASS INDEX: 44.1 KG/M2 | DIASTOLIC BLOOD PRESSURE: 76 MMHG | SYSTOLIC BLOOD PRESSURE: 115 MMHG | TEMPERATURE: 97 F | WEIGHT: 315 LBS | HEART RATE: 80 BPM

## 2022-08-25 VITALS
HEIGHT: 71 IN | SYSTOLIC BLOOD PRESSURE: 122 MMHG | TEMPERATURE: 98 F | WEIGHT: 315 LBS | RESPIRATION RATE: 18 BRPM | DIASTOLIC BLOOD PRESSURE: 74 MMHG | HEART RATE: 87 BPM | BODY MASS INDEX: 44.1 KG/M2 | OXYGEN SATURATION: 97 %

## 2022-08-25 DIAGNOSIS — K21.9 LPRD (LARYNGOPHARYNGEAL REFLUX DISEASE): ICD-10-CM

## 2022-08-25 DIAGNOSIS — I31.39 PERICARDIAL EFFUSION: Primary | ICD-10-CM

## 2022-08-25 DIAGNOSIS — K29.80 DUODENITIS: ICD-10-CM

## 2022-08-25 DIAGNOSIS — D72.828 NEUTROPHILIA: Primary | ICD-10-CM

## 2022-08-25 DIAGNOSIS — D72.829 LEUKOCYTOSIS, UNSPECIFIED TYPE: ICD-10-CM

## 2022-08-25 DIAGNOSIS — D64.9 ANEMIA, UNSPECIFIED TYPE: ICD-10-CM

## 2022-08-25 DIAGNOSIS — I10 ESSENTIAL HYPERTENSION: ICD-10-CM

## 2022-08-25 DIAGNOSIS — L08.9 LOCAL INFECTION OF SKIN AND SUBCUTANEOUS TISSUE: ICD-10-CM

## 2022-08-25 DIAGNOSIS — I10 HYPERTENSION GOAL BP (BLOOD PRESSURE) < 140/90: ICD-10-CM

## 2022-08-25 LAB
BASOPHILS # BLD AUTO: 0 10E3/UL (ref 0–0.2)
BASOPHILS NFR BLD AUTO: 0 %
EOSINOPHIL # BLD AUTO: 0.2 10E3/UL (ref 0–0.7)
EOSINOPHIL NFR BLD AUTO: 1 %
ERYTHROCYTE [DISTWIDTH] IN BLOOD BY AUTOMATED COUNT: 13.2 % (ref 10–15)
HCT VFR BLD AUTO: 39.3 % (ref 40–53)
HGB BLD-MCNC: 12.5 G/DL (ref 13.3–17.7)
IMM GRANULOCYTES # BLD: 0.1 10E3/UL
IMM GRANULOCYTES NFR BLD: 0 %
LYMPHOCYTES # BLD AUTO: 2.6 10E3/UL (ref 0.8–5.3)
LYMPHOCYTES NFR BLD AUTO: 23 %
MCH RBC QN AUTO: 27.4 PG (ref 26.5–33)
MCHC RBC AUTO-ENTMCNC: 31.8 G/DL (ref 31.5–36.5)
MCV RBC AUTO: 86 FL (ref 78–100)
MONOCYTES # BLD AUTO: 0.8 10E3/UL (ref 0–1.3)
MONOCYTES NFR BLD AUTO: 7 %
NEUTROPHILS # BLD AUTO: 7.8 10E3/UL (ref 1.6–8.3)
NEUTROPHILS NFR BLD AUTO: 69 %
NRBC # BLD AUTO: 0 10E3/UL
NRBC BLD AUTO-RTO: 0 /100
PLATELET # BLD AUTO: 296 10E3/UL (ref 150–450)
RBC # BLD AUTO: 4.56 10E6/UL (ref 4.4–5.9)
WBC # BLD AUTO: 11.4 10E3/UL (ref 4–11)

## 2022-08-25 PROCEDURE — 88189 FLOWCYTOMETRY/READ 16 & >: CPT | Mod: GC | Performed by: STUDENT IN AN ORGANIZED HEALTH CARE EDUCATION/TRAINING PROGRAM

## 2022-08-25 PROCEDURE — 88185 FLOWCYTOMETRY/TC ADD-ON: CPT | Performed by: INTERNAL MEDICINE

## 2022-08-25 PROCEDURE — G0463 HOSPITAL OUTPT CLINIC VISIT: HCPCS

## 2022-08-25 PROCEDURE — 36415 COLL VENOUS BLD VENIPUNCTURE: CPT | Performed by: INTERNAL MEDICINE

## 2022-08-25 PROCEDURE — 99214 OFFICE O/P EST MOD 30 MIN: CPT | Performed by: INTERNAL MEDICINE

## 2022-08-25 PROCEDURE — 88184 FLOWCYTOMETRY/ TC 1 MARKER: CPT | Performed by: INTERNAL MEDICINE

## 2022-08-25 PROCEDURE — 99214 OFFICE O/P EST MOD 30 MIN: CPT | Performed by: FAMILY MEDICINE

## 2022-08-25 PROCEDURE — 85025 COMPLETE CBC W/AUTO DIFF WBC: CPT | Performed by: INTERNAL MEDICINE

## 2022-08-25 RX ORDER — SULFAMETHOXAZOLE/TRIMETHOPRIM 800-160 MG
1 TABLET ORAL 2 TIMES DAILY
Qty: 14 TABLET | Refills: 0 | Status: SHIPPED | OUTPATIENT
Start: 2022-08-25 | End: 2022-09-01

## 2022-08-25 ASSESSMENT — PATIENT HEALTH QUESTIONNAIRE - PHQ9
SUM OF ALL RESPONSES TO PHQ QUESTIONS 1-9: 2
SUM OF ALL RESPONSES TO PHQ QUESTIONS 1-9: 2
10. IF YOU CHECKED OFF ANY PROBLEMS, HOW DIFFICULT HAVE THESE PROBLEMS MADE IT FOR YOU TO DO YOUR WORK, TAKE CARE OF THINGS AT HOME, OR GET ALONG WITH OTHER PEOPLE: NOT DIFFICULT AT ALL

## 2022-08-25 ASSESSMENT — ENCOUNTER SYMPTOMS
SHORTNESS OF BREATH: 0
COUGH: 0

## 2022-08-25 ASSESSMENT — PAIN SCALES - GENERAL: PAINLEVEL: EXTREME PAIN (9)

## 2022-08-25 NOTE — PROGRESS NOTES
"Oncology Rooming Note    August 25, 2022 8:00 AM   Kimo Greenwood is a 41 year old male who presents for:    Chief Complaint   Patient presents with     Oncology Clinic Visit     Leukocytosis, unspecified type      Initial Vitals: /76   Pulse 80   Temp 97  F (36.1  C) (Tympanic)   Resp 16   Ht 1.791 m (5' 10.5\")   Wt (!) 173.7 kg (382 lb 14.4 oz)   SpO2 97%   BMI 54.16 kg/m   Estimated body mass index is 54.16 kg/m  as calculated from the following:    Height as of this encounter: 1.791 m (5' 10.5\").    Weight as of this encounter: 173.7 kg (382 lb 14.4 oz). Body surface area is 2.94 meters squared.  Extreme Pain (9) Comment: Data Unavailable   No LMP for male patient.  Allergies reviewed: Yes  Medications reviewed: Yes    Medications: Medication refills not needed today.  Pharmacy name entered into [facility]:    [id number] - [place], [place] - [address] [facility] [address] [place] [address] [facility] - [place], [place] - [address] [address] Keenan CMA              "

## 2022-08-25 NOTE — LETTER
8/25/2022         RE: Kimo Greenwood  [address]  [place] [address]        Dear Colleague,    Thank you for referring your patient, Kimo Greenwood, to the [facility]. Please see a copy of my visit note below.    [facility]    Hematology/Oncology Established Patient Note      Today's Date: 8/24/22    Reason for Consultation: Elevated WBC  Referring Provider: Kory Hudson MD      HISTORY OF PRESENT ILLNESS: Kimo Greenwood is a 41 year old male who is referred for elevated WBC, anemia, splenomegaly. 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    Patient has evidence of chronic, intermittent leukocytosis since 2012. Specifically, he has had neutrophilia. On 4/22/22, WBC was 11.1, ANC 9.7. On 4/27/22, WBC was 8.0, ANC 4.8. Patient also has evidence of chronic, intermittent NCNC anemia. Record review shows gillian of 9.1 in 2017. Patient and mother unable to recall the events of that timeframe.     Of note, patient was hospitalized for pneumonia earlier this year. Per record review, an incidental mass was noted an 8.7 cm lipomatous mass in the retroperitoneum near the kidney and spleen. He was evaluated by surgical oncology, Dr. Ojeda, with biopsy on 3/8/22 returning as lipomatous tumor/liposarcoma, negative for MDM2. He is due for follow up imaging in September 2022 with follow in surg onc clinic.    A repeat CT chest was done on 3/23/22, which returned with significant improvement RLL pneumonia. It continued to show the 8.7 x 7.9 x 9.3 cm RP mass in the LUQ. The spleen was enlarged to 15.5 cm. No other adenopathy.     Patient is present with mother. They report only new medication is liraglutide for weight management. No weight loss, fever, drenching night sweats, or LAD.     Of note,  patient lives at [facility]. He is able to perform ADLs. He does smoke cigarettes.       INTERIM HISTORY:  No acute events since our last visit. No B symptoms. He will follow up with Dr. Ojeda in September 2022.      REVIEW OF SYSTEMS:   A 14 point ROS was reviewed with pertinent positives and negatives in the HPI.        HOME MEDICATIONS:  Current Outpatient Medications   Medication Sig Dispense Refill     albuterol (VENTOLIN HFA) 108 (90 Base) MCG/ACT inhaler INHALE 2 PUFFS INTO LUNGS EVERY SIX HOURS AS NEEDED FOR SHORTNESS OF BREATH / DYSPNEA OR WHEEZING 18 g 1     DULoxetine (CYMBALTA) 60 MG capsule Take 120 mg by mouth daily        FIBER- MG tablet TAKE 2 TABLETS BY MOUTH TWICE A DAY (Patient taking differently: Take 2 tablets by mouth 2 times daily) 360 tablet 3     hydrochlorothiazide (HYDRODIURIL) 25 MG tablet Take 1 tablet (25 mg) by mouth daily 90 tablet 3     insulin pen needle (31G X 6 MM) 31G X 6 MM miscellaneous Use 1 pen needles daily or as directed. 100 each 0     Lidocaine (LIDOCARE) 4 % Patch Place 1-2 patches onto the skin every 24 hours To prevent lidocaine toxicity, patient should be patch free for 12 hrs daily. 60 patch 1     liraglutide - Weight Management (SAXENDA) 18 MG/3ML pen Inject 0.6 mg Subcutaneous daily for 7 days, THEN 1.2 mg daily for 7 days, THEN 1.8 mg daily for 7 days, THEN 2.4 mg daily for 7 days, THEN 3 mg daily. 27 mL 1     liraglutide - Weight Management (SAXENDA) 18 MG/3ML pen Inject 3 mg subcutaneously daily. 15 mL 3     lisinopril (ZESTRIL) 10 MG tablet TAKE 1 TABLET BY MOUTH EVERY MORNING 28 tablet 3     mirabegron (MYRBETRIQ) 50 MG 24 hr tablet Take 1 tablet by mouth daily       naproxen sodium 220 MG capsule If Tylenol is not helpful, this can be added 2 tabs twice a day with meals. (Patient taking differently: Take 440 mg by mouth 2 times daily (with meals)) 60 capsule 3     OLANZapine (ZYPREXA) 10 MG tablet Take 1 tablet (10 mg) by mouth At Bedtime (Patient  not taking: Reported on 7/21/2022) 30 tablet 0     OLANZapine-samidorphan (LYBALVI) 10-10 MG TABS tablet Take 1 tablet by mouth At Bedtime       order for DME Equipment being ordered: CPAP supplies 1 each 0     oxybutynin ER (DITROPAN XL) 15 MG 24 hr tablet Take 30 mg by mouth daily       pantoprazole (PROTONIX) 40 MG EC tablet TAKE 1 TABLET BY MOUTH 30-60 MINUTES BEFORE FIRST MEAL OF THE DAY 28 tablet 3     prazosin (MINIPRESS) 1 MG capsule Take 3 mg by mouth At Bedtime       VITAMIN D3 50 MCG (2000 UT) tablet TAKE ONE TABLET BY MOUTH EVERY DAY 90 tablet 3         ALLERGIES:  Allergies   Allergen Reactions     Other [No Clinical Screening - See Comments] Other (See Comments)     Awoke from anesthesia with anger and ripping off dressing, after interstim placement, outside hospital 2013         PAST MEDICAL HISTORY:  Past Medical History:   Diagnosis Date     Arthritis     DDD hips and knees     Asthma      Asthma      Patel's esophagus      Chronic pain      Chronic pain - left hip/leg pain     degenerative disc disease, back, hips, knees     Gastroesophageal reflux disease      History of anesthesia complications     agitation x1 after interstim 2013     Hypertension      Hypertension      Leukocytosis      LPRD (laryngopharyngeal reflux disease)      Marijuana abuse      Marijuana abuse      MDD (major depressive disorder)      MDD (major depressive disorder)      Mental retardation, mild (I.Q. 50-70)      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    With associated speech/language delay     Obesity 11/11/2010     LOREN (obstructive sleep apnea)     Uses a CPAP     LOREN (obstructive sleep apnea)      Seborrheic dermatitis      Seborrheic dermatitis      Sleep apnea     CPAP         PAST SURGICAL HISTORY:  Past Surgical History:   Procedure Laterality Date     ARTHROPLASTY HIP Left 1/25/2017    Procedure: ARTHROPLASTY HIP;  Surgeon: Bala Randall MD;  Location: [facility] OR     ARTHROPLASTY HIP Left       ARTHROPLASTY REVISION HIP Left 6/24/2019    Procedure: Revision left total hip arthroplasty with a head and liner exchange to a Biomet dual mobility head and liner.;  Surgeon: Bala Randall MD;  Location: [facility] OR     BLADDER SURGERY      Ibarra, [facility],Interstem stimulator implant     CLOSED REDUCTION HIP Left 6/10/2019    Procedure: Closed reduction under general anesthesia left recurrent prosthetic hip dislocation;  Surgeon: Rafat Cazares MD;  Location: [facility] OR     COLONOSCOPY N/A 10/30/2015    Procedure: COMBINED COLONOSCOPY, SINGLE OR MULTIPLE BIOPSY/POLYPECTOMY BY BIOPSY;  Surgeon: Alexis Jackson MD;  Location: [facility] GI     COLONOSCOPY N/A 11/17/2016    Procedure: COMBINED COLONOSCOPY, SINGLE OR MULTIPLE BIOPSY/POLYPECTOMY BY BIOPSY;  Surgeon: Cat Huber MD;  [facility]: [facility]     COLONOSCOPY N/A 10/28/2020    Procedure: COLONOSCOPY;  Surgeon: You Kraus MD;  Location: [facility] OR     COLONOSCOPY       ESOPHAGOSCOPY, GASTROSCOPY, DUODENOSCOPY (EGD), COMBINED N/A 11/17/2016    Procedure: COMBINED ESOPHAGOSCOPY, GASTROSCOPY, DUODENOSCOPY (EGD), BIOPSY SINGLE OR MULTIPLE;  Surgeon: Cat Huber MD;  [facility]     ESOPHAGOSCOPY, GASTROSCOPY, DUODENOSCOPY (EGD), COMBINED N/A 3/12/2020    Procedure: ESOPHAGOGASTRODUODENOSCOPY WITH BIOPSIES;  Surgeon: You Kraus MD;  Location: [facility] OR     ESOPHAGOSCOPY, GASTROSCOPY, DUODENOSCOPY (EGD), COMBINED N/A 10/28/2020    Procedure: ESOPHAGOGASTRODUODENOSCOPY, WITH BIOPSY;  Surgeon: You Kraus MD;  Location: [facility] OR     ESOPHAGOSCOPY, GASTROSCOPY, DUODENOSCOPY (EGD), COMBINED       EXAM UNDER ANESTHESIA, FULGURATE CONDYLOMA ANUS, COMBINED N/A 12/22/2016    Procedure: COMBINED EXAM UNDER ANESTHESIA, FULGURATE CONDYLOMA ANUS;  Surgeon: Nya Cabello MD;  [facility]     GENITOURINARY SURGERY       JOINT REPLACEMENT Left 2017    MARGO, Revision Left MARGO     OTHER SURGICAL HISTORY      interstim  "stimulator implant     AR CYSTOURETHROSCOPY INJ CHEMODENERVATION BLADDER N/A 1/16/2019    Procedure: CYSTOSCOPY BOTOX BLADDER INJECTIONS (100 UNITS IN 10CC);  Surgeon: Didier Ibarra MD;  Location: [facility];  Service: Urology     AR IMPLANT PERIPH/GASTRIC NEUROSTIM/ N/A 1/8/2020    Procedure: NEW INTERSTIM LEAD, REPLACE OLD; NEW INTERSTIM BATTERY, REPLACE OLD;  Surgeon: Didier Ibarra MD;  Location: [facility];  Service: Urology         SOCIAL HISTORY:  Social History     Socioeconomic History     Marital status: Single     Spouse name: Not on file     Number of children: Not on file     Years of education: Not on file     Highest education level: Not on file   Occupational History     Not on file   Tobacco Use     Smoking status: Current Every Day Smoker     Packs/day: 1.00     Years: 1.00     Pack years: 1.00     Types: Vaping Device     Smokeless tobacco: Current User     Types: Chew     Tobacco comment: Smokes E Cigs equal to 1 PPD   Vaping Use     Vaping Use: Every day     Substances: Nicotine, Flavoring     Devices: Refillable tank   Substance and Sexual Activity     Alcohol use: Yes     Comment: socially--\"not very often\" \"once a month\"     Drug use: No     Comment: patient denies any marijuana use     Sexual activity: Yes     Partners: Female     Birth control/protection: Condom   Other Topics Concern     Parent/sibling w/ CABG, MI or angioplasty before 65F 55M? No   Social History Narrative     Not on file     Social Determinants of Health     Financial Resource Strain: Not on file   Food Insecurity: Not on file   Transportation Needs: Not on file   Physical Activity: Not on file   Stress: Not on file   Social Connections: Not on file   Intimate Partner Violence: Not on file   Housing Stability: Not on file         FAMILY HISTORY:  Family History   Problem Relation Age of Onset     Anxiety Disorder Mother      Depression Mother      Ulcerative Colitis Mother 18     Breast " "Cancer Maternal Grandmother      Cerebrovascular Disease Maternal Grandmother      Breast Cancer Maternal Aunt      Cancer Maternal Grandfather         grt uncles colon cancer         PHYSICAL EXAM:  Vital signs:  /76   Pulse 80   Temp 97  F (36.1  C) (Tympanic)   Resp 16   Ht 1.791 m (5' 10.5\")   Wt (!) 173.7 kg (382 lb 14.4 oz)   SpO2 97%   BMI 54.16 kg/m       GENERAL/CONSTITUTIONAL: No acute distress.  LYMPH: No anterior cervical, posterior cervical, supraclavicular, axillary or inguinal adenopathy.   RESPIRATORY: Clear to auscultation bilaterally. No crackles or wheezing.   CARDIOVASCULAR: Regular rate and rhythm without murmurs, gallops, or rubs.  GASTROINTESTINAL: No hepatosplenomegaly, masses, or tenderness. The patient has normal bowel sounds. No guarding.  No distention.  MUSCULOSKELETAL: Warm and well-perfused, no cyanosis, clubbing, or edema.   NEUROLOGIC: Cranial nerves II-XII are intact. Alert, oriented, answers questions appropriately.  INTEGUMENTARY: No rashes or jaundice.  GAIT: Antalgic gait.      LABS:   Latest Reference Range & Units 07/21/22 09:50   Sodium 133 - 144 mmol/L 135   Potassium 3.4 - 5.3 mmol/L 3.5   Chloride 94 - 109 mmol/L 103   Carbon Dioxide 20 - 32 mmol/L 26   Urea Nitrogen 7 - 30 mg/dL 14   Creatinine 0.66 - 1.25 mg/dL 0.71   GFR Estimate >60 mL/min/1.73m2 >90   Calcium 8.5 - 10.1 mg/dL 9.4   Anion Gap 3 - 14 mmol/L 6   Albumin 3.4 - 5.0 g/dL 3.4   Protein Total 6.8 - 8.8 g/dL 7.6   Alkaline Phosphatase 40 - 150 U/L 104   ALT 0 - 70 U/L 30   AST 0 - 45 U/L 8   Bilirubin Total 0.2 - 1.3 mg/dL 0.3   Ferritin 26 - 388 ng/mL 107   Folate 4.6 - 34.8 ng/mL 15.6   Iron 35 - 180 ug/dL 112   Iron Binding Cap 240 - 430 ug/dL 278   Iron Saturation Index 15 - 46 % 40   Lactate Dehydrogenase 85 - 227 U/L 195   TSH 0.40 - 4.00 mU/L 0.67   Vitamin B12 232 - 1,245 pg/mL 358   Glucose 70 - 99 mg/dL 101 (H)   WBC 4.0 - 11.0 10e3/uL 18.4 (H)   Hemoglobin 13.3 - 17.7 g/dL 12.4 (L) "   Hematocrit 40.0 - 53.0 % 37.7 (L)   Platelet Count 150 - 450 10e3/uL 265   RBC Count 4.40 - 5.90 10e6/uL 4.33 (L)   MCV 78 - 100 fL 87   MCH 26.5 - 33.0 pg 28.6   MCHC 31.5 - 36.5 g/dL 32.9   RDW 10.0 - 15.0 % 13.0   % Neutrophils % 89   % Lymphocytes % 6   % Monocytes % 4   % Eosinophils % 0   % Basophils % 0   Absolute Basophils 0.0 - 0.2 10e3/uL 0.0   Absolute Eosinophils 0.0 - 0.7 10e3/uL 0.0   Absolute Immature Granulocytes <=0.4 10e3/uL 0.1   Absolute Lymphocytes 0.8 - 5.3 10e3/uL 1.1   Absolute Monocytes 0.0 - 1.3 10e3/uL 0.8   % Immature Granulocytes % 1   Absolute Neutrophils 1.6 - 8.3 10e3/uL 16.4 (H)   Absolute NRBCs 10e3/uL 0.0   NRBCs per 100 WBC <1 /100 0   % Retic 0.5 - 2.0 % 1.7   Absolute Retic 0.025 - 0.095 10e6/uL 0.075       PATHOLOGY:  Surgical Pathology Report                         Case: [id number]                                   Authorizing Provider:  Star Ojeda MD         Collected:           03/08/2022 11:21 AM           Ordering Location:     [facility]   Received:            03/08/2022 01:49 PM                                  Imaging                                                                       Pathologist:           Heather Sidhu MD                                                            Specimen:    Retroperitoneal, Retroperitoneal Biopsy                                                    Final Diagnosis   Retroperitoneal mass, CT-guided needle biopsy:  -Mature adipose tissue, consistent with lipomatous tumor.  Limited sample.  See COMMENT.   Electronically signed by Heather Sidhu MD on 3/14/2022 at  2:36 PM   Comment    The morphologic findings are concerning for liposarcoma.  The biopsy is limited in nature, comprising of needle biopsy cores of lipomatous tissue.  The biopsy tissue may not be representative of the radiographically identified large (reported 8.7 cm) mass.  Fluorescence in-situ hybridization (FISH) for MDM2 probe is pending at  this time (please see separate forthcoming [facility] cytogenetics laboratory report for results).       INTERPRETATION:  None (0%) of the interphase cells examined had a signal pattern indicative of amplification of MDM2.      Final Diagnosis 7/21/22:   Peripheral blood for morphology:  -Mild normochromic, normocytic anemia without evidence of red cell regeneration  -Leukocytosis with mature neutrophilia; leukocytes without morphologic abnormalities         IMAGING:  CT CHEST WITHOUT CONTRAST March 23, 2022  FINDINGS:   Chest/mediastinum: No cardiomegaly. Small pericardial effusion. No  significant mediastinal, hilar or axillary lymphadenopathy. Asymmetric  elevation of the left hemidiaphragm as compared to the right.     Lung/pleura: Trace left pleural effusion. No significant right pleural  effusion. No significant pneumothorax. Significant interval  improvement in the previously seen right lower lobe consolidative  pulmonary opacities as compared to 2/19/2022 outside CT. Only small  patchy foci of opacity in the medial aspect of the right lower lobe is  seen.      Upper abdomen: Limited evaluation of the upper abdomen due to lack of  coverage and contrast. Cholelithiasis without CT evidence of acute  cholecystitis. There is approximately 8.7 x 7.9 x 9.3 cm (axial and CC  dimensions, respectively), fatty area with minimal fat stranding in  the left upper quadrant retroperitoneal space just above the superior  pole of the kidney and abutting the adjacent adrenal gland, kidney,  pancreas and spleen. The spleen is enlarged measuring 15.5 cm in  craniocaudal dimension.     Bones and soft tissue: Multilevel degenerative changes of the spine.  Diffuse heterogenous appearance of the spine of indeterminate  etiology.                                                                      IMPRESSION:    1. Significant interval improvement in the previously seen right lower  lobe consolidative pulmonary opacity  as compared to 2/19/2022 outside  CT. Only small patchy opacity in the medial aspect of the right lower  lobe remaining.  2. Small pericardial effusion.  3. Trace left pleural effusion.  4. There is approximately 8.7 x 7.9 x 9.3 cm fat attenuating area with  minimal fat stranding in the left upper quadrant, consistent with the  biopsy-proven lipomatous tumor.  5. Cholelithiasis without CT evidence of acute cholecystitis.  6. Splenomegaly.  7. Multilevel degenerative changes of the spine with diffuse  heterogenous appearance of the thoracic spine of indeterminate  Etiology.      ASSESSMENT/PLAN:  Kimo Greenwood is a 41 year old male who is referred for elevated WBC, anemia, splenomegaly. 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    1) Neutrophilia, anemia, splenomegaly  -Patient has evidence of chronic, intermittent neutrophilia and NC/NC anemia.  -Asymptomatic.  -On 4/27/22, total WBCs were 8.0.  -I discussed with the patient we are in need of repeat studies. We discussed possible underlying etiologies to include reactive, inflammatory, but with the involvement of two cell lines, there is a possibility of an underlying lymphoproliferative disorder.  -Repeat CBC in July 2022 with persistent neutrophilia and NC/NC anemia. Negative for dysplasia or evidence of blasts.  -Iron studies, B12, folate, TSH WNL in July 2022.  -Repeat CBC and check FLOW, myeloid NGS on peripheral blood. Patient declines bone marrow biopsy at this time.     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  -Due for repeat scans and follow up with Dr. Ojeda in 9/2022.    3) History of MDD    4) History of HTN, LOREN    5) Follow up in 1 month. Will determine further workup based on repeat labs above.       Danna Amato,  "DO  Hematology/Oncology  [facility]      Oncology Rooming Note    August 25, 2022 8:00 AM   Kimo Greenwood is a 41 year old male who presents for:    Chief Complaint   Patient presents with     Oncology Clinic Visit     Leukocytosis, unspecified type      Initial Vitals: /76   Pulse 80   Temp 97  F (36.1  C) (Tympanic)   Resp 16   Ht 1.791 m (5' 10.5\")   Wt (!) 173.7 kg (382 lb 14.4 oz)   SpO2 97%   BMI 54.16 kg/m   Estimated body mass index is 54.16 kg/m  as calculated from the following:    Height as of this encounter: 1.791 m (5' 10.5\").    Weight as of this encounter: 173.7 kg (382 lb 14.4 oz). Body surface area is 2.94 meters squared.  Extreme Pain (9) Comment: Data Unavailable   No LMP for male patient.  Allergies reviewed: Yes  Medications reviewed: Yes    Medications: Medication refills not needed today.  Pharmacy name entered into [facility]:    [facility] [place] [place], [place] - [address] [facility] [facility] [place] - [address] [facility] - [place], [place] - [id number] [place] [address] LOWELL Keenan [facility]                  Again, thank you for allowing me to participate in the care of your patient.        Sincerely,        Danna Amato DO    "

## 2022-08-25 NOTE — PROGRESS NOTES
"  Assessment & Plan     Pericardial effusion  Patient was seen in the ED 7-, he did have CT chest with some incidental findings of pericardial effusion, no tamponade.  Currently asymptomatic, recommend repeat echocardiogram for surveillance.  - Echocardiogram Complete    Local infection of skin and subcutaneous tissue  Start Bactrim; monitor for worsening. No current abscess.   - sulfamethoxazole-trimethoprim (BACTRIM DS) 800-160 MG tablet  Dispense: 14 tablet; Refill: 0        Return in about 1 week (around 9/1/2022) for If symptoms do not improve or gets worse..    Ciro Love MD  [facility] SOPHIE Lamar is a 41 year old, presenting for the following health issues:  Hospital F/U  Answers for HPI/ROS submitted by the patient on 8/25/2022  If you checked off any problems, how difficult have these problems made it for you to do your work, take care of things at home, or get along with other people?: Not difficult at all  PHQ9 TOTAL SCORE: 2    HPI     ED/UC Followup:    Facility:  [facility]   Date of visit: 7/31/2022  Reason for visit: Chest Pain  Current Status: Pt states that he is feeling better, no chest pain  Found gallstones and fluid around his heart at this same visit, would like to discuss those findings as well.    Mom accompanies him, noticed a rash on his bellybutton.  Painful to touch.  Review of Systems   Respiratory: Negative for cough and shortness of breath.    Cardiovascular: Negative for chest pain.            Objective    /74   Pulse 87   Temp 98  F (36.7  C) (Oral)   Resp 18   Ht 1.791 m (5' 10.5\")   Wt (!) 172.9 kg (381 lb 1.6 oz)   SpO2 97%   BMI 53.91 kg/m    Body mass index is 53.91 kg/m .  Physical Exam  Cardiovascular:      Rate and Rhythm: Normal rate and regular rhythm.      Pulses: Normal pulses.      Heart sounds: Normal heart sounds.   Pulmonary:      Effort: Pulmonary effort is normal.      Breath sounds: Normal " breath sounds. No stridor.   Skin:            Comments: 3x3 cm Erythema with increased warmth just below the navel, no fluctuance                            .  ..

## 2022-08-25 NOTE — PROGRESS NOTES
Medical Assistant Note:  Kimo Greenwood presents today for blood draw.    Patient seen by provider today: Yes: Dr. Amato.   present during visit today: Not Applicable.    Concerns: No Concerns.    Procedure:  Labs drawn    Post Assessment:  Labs drawn without difficulty: Yes.    Discharge Plan:  Departure Mode: Ambulatory.    Face to Face Time: 10 min.    Deana Keenan CMA

## 2022-08-26 LAB
PATH REPORT.COMMENTS IMP SPEC: NORMAL
PATH REPORT.FINAL DX SPEC: NORMAL
PATH REPORT.MICROSCOPIC SPEC OTHER STN: NORMAL
PATH REPORT.RELEVANT HX SPEC: NORMAL

## 2022-08-26 RX ORDER — PANTOPRAZOLE SODIUM 40 MG/1
TABLET, DELAYED RELEASE ORAL
Qty: 30 TABLET | Refills: 5 | Status: SHIPPED | OUTPATIENT
Start: 2022-08-26 | End: 2023-02-06

## 2022-08-26 RX ORDER — HYDROCHLOROTHIAZIDE 25 MG/1
TABLET ORAL
Qty: 30 TABLET | Refills: 5 | Status: SHIPPED | OUTPATIENT
Start: 2022-08-26 | End: 2023-02-06

## 2022-08-26 RX ORDER — LISINOPRIL 10 MG/1
TABLET ORAL
Qty: 30 TABLET | Refills: 5 | Status: SHIPPED | OUTPATIENT
Start: 2022-08-26 | End: 2023-02-06

## 2022-09-01 NOTE — PATIENT INSTRUCTIONS
Willard is scheduled for a follow up with Dr. Amato on 09/27/22 at 1:30 pm.    Chloe Davenport RN on 9/1/2022 at 7:43 AM

## 2022-09-06 ENCOUNTER — TRANSFERRED RECORDS (OUTPATIENT)
Dept: HEALTH INFORMATION MANAGEMENT | Facility: CLINIC | Age: 42
End: 2022-09-06

## 2022-09-12 ENCOUNTER — HOSPITAL ENCOUNTER (OUTPATIENT)
Dept: CARDIOLOGY | Facility: CLINIC | Age: 42
Discharge: HOME | End: 2022-09-12
Attending: FAMILY MEDICINE | Admitting: FAMILY MEDICINE
Payer: COMMERCIAL

## 2022-09-12 DIAGNOSIS — I31.39 PERICARDIAL EFFUSION: ICD-10-CM

## 2022-09-12 LAB — LVEF ECHO: NORMAL

## 2022-09-12 PROCEDURE — 93306 TTE W/DOPPLER COMPLETE: CPT

## 2022-09-12 PROCEDURE — 93306 TTE W/DOPPLER COMPLETE: CPT | Mod: 26 | Performed by: INTERNAL MEDICINE

## 2022-09-13 ENCOUNTER — TELEPHONE (OUTPATIENT)
Dept: FAMILY MEDICINE | Facility: CLINIC | Age: 42
End: 2022-09-13

## 2022-09-13 NOTE — LETTER
September 15, 2022      Kimo Greenwood  [address]  [place] [address]        Dear Kimo,     We have been trying to reach you however we have been unsuccessful. Dr. Love has reviewed your echocardiogram results below    There is still small amount of fluid around your heart. If you are ever having difficulty breathing or having chest pain, you need to return to the ER. Let's schedule a follow-up in 6 months to get this rechecked.    You can make an appointment through my chart or by calling the clinic at [phone].    Sincerely,    Ciro Love MD

## 2022-09-13 NOTE — TELEPHONE ENCOUNTER
----- Message from Ciro Love MD sent at 9/13/2022 12:23 PM CDT -----  The tests are abnormal: please let patient know #there is still fluid around his heart, it is a very small amount.  If he is ever having difficulty breathing or having chest pain, he should go back to the ER. Otherwise I think it will be good to schedule an appointment in 6 months to follow up on this condition.    Thanks,  EULALIA

## 2022-09-13 NOTE — TELEPHONE ENCOUNTER
Tried calling however VM has not been set up yet. MyChart message as well regarding follow up test results.    Judy ALEXANDER RN

## 2022-09-19 ENCOUNTER — TELEPHONE (OUTPATIENT)
Dept: FAMILY MEDICINE | Facility: CLINIC | Age: 42
End: 2022-09-19

## 2022-09-19 DIAGNOSIS — E66.01 MORBID OBESITY (H): ICD-10-CM

## 2022-09-19 RX ORDER — LIRAGLUTIDE 6 MG/ML
INJECTION, SOLUTION SUBCUTANEOUS
Qty: 15 ML | Refills: 1 | Status: SHIPPED | OUTPATIENT
Start: 2022-09-19 | End: 2022-12-23

## 2022-09-19 NOTE — TELEPHONE ENCOUNTER
Staff from group home calls,    Routing refill request to provider for review/approval because:  Out of med, need asap, informs MTM monitors  Routed both to MTM and PCP, please advise ongoing refills, route to [facility] group home and when pt due for next appointment and if virtual okay    # [phone] call back to group home, may ask for Samira or Iris Rush RN, BSN  [facility]

## 2022-09-19 NOTE — TELEPHONE ENCOUNTER
Refilled. Please help schedule for virtual f/up MTM appt.     Dorina Benz, PharmD, [facility]  Medication Therapy Management Provider, [facility]  Pager: [phone]

## 2022-09-19 NOTE — TELEPHONE ENCOUNTER
Called and informed Samira at group home refill was sent in and pt is due for virtual appt. Samira will call back to help get pt scheduled.    Judy ALEXANDER RN

## 2022-09-20 ENCOUNTER — HOSPITAL ENCOUNTER (OUTPATIENT)
Dept: CT IMAGING | Facility: CLINIC | Age: 42
Discharge: HOME | End: 2022-09-20
Attending: SURGERY | Admitting: SURGERY
Payer: COMMERCIAL

## 2022-09-20 DIAGNOSIS — R19.00 RETROPERITONEAL MASS: ICD-10-CM

## 2022-09-20 PROCEDURE — 258N000003 HC RX IP 258 OP 636: Performed by: SURGERY

## 2022-09-20 PROCEDURE — 74177 CT ABD & PELVIS W/CONTRAST: CPT

## 2022-09-20 PROCEDURE — 250N000011 HC RX IP 250 OP 636: Performed by: SURGERY

## 2022-09-20 RX ORDER — IOPAMIDOL 755 MG/ML
500 INJECTION, SOLUTION INTRAVASCULAR ONCE
Status: COMPLETED | OUTPATIENT
Start: 2022-09-20 | End: 2022-09-20

## 2022-09-20 RX ADMIN — IOPAMIDOL 100 ML: 755 INJECTION, SOLUTION INTRAVENOUS at 13:56

## 2022-09-20 RX ADMIN — SODIUM CHLORIDE 55 ML: 9 INJECTION, SOLUTION INTRAVENOUS at 13:56

## 2022-09-23 ENCOUNTER — VIRTUAL VISIT (OUTPATIENT)
Dept: ONCOLOGY | Facility: CLINIC | Age: 42
End: 2022-09-23
Attending: SURGERY
Payer: COMMERCIAL

## 2022-09-23 DIAGNOSIS — R19.00 RETROPERITONEAL MASS: ICD-10-CM

## 2022-09-23 PROCEDURE — 99213 OFFICE O/P EST LOW 20 MIN: CPT | Mod: 95 | Performed by: SURGERY

## 2022-09-23 NOTE — LETTER
9/23/2022         RE: Kimo Greenwood  [address]  [place] [address]        Dear Colleague,    Thank you for referring your patient, Kimo Greenwood, to the [facility]. Please see a copy of my visit note below.    Willard is a 41 year old who is being evaluated via a billable video visit.     Pt has pain in right hip and both knees.    How would you like to obtain your AVS? MyChart  If the video visit is dropped, the invitation should be resent by: Send to e-mail at: [email or web address]  Will anyone else be joining your video visit? Yes: Willard. How would they like to receive their invitation? Other e-mail: [email or web address]      Raquel PA    Video-Visit Details    Video Start Time:      Dictation on: 09/23/2022 11:58 AM by: NORIS DREW [[id number]]         VIDEO VISIT    HISTORY OF PRESENT ILLNESS:  Today, I had a video visit with Willard Greenwood that started at 11:50 and ended at 11:55.  I saw Willard in February.  At that time, he was diagnosed with an incidental lipomatous mass in his left retroperitoneum.  It was asymptomatic.  He underwent a biopsy which demonstrated a lipomatous mass.  There was no MDM2 amplification.  He had a followup CT scan this week which demonstrated stability of the retroperitoneal mass.  There was also a questionable liver lesion.  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  He remains asymptomatic from his left retroperitoneal lipoma.    IMPRESSION:  Benign retroperitoneal lipomatous mass.    PLAN:  We will obtain a CT scan in 6 months' time and we will reevaluate the lipomatous mass as well as the liver lesions at that time.  If everything looks unchanged, then we will stop imaging on him.    Total time was 20 minutes which included reviewing his most recent imaging, the video consult and coordinating his care.        Again, thank you for allowing me to  participate in the care of your patient.      Sincerely,    Star Ojeda MD

## 2022-09-23 NOTE — PROGRESS NOTES
Willard is a 41 year old who is being evaluated via a billable video visit.     Pt has pain in right hip and both knees.    How would you like to obtain your AVS? MyChart  If the video visit is dropped, the invitation should be resent by: Send to e-mail at: [email or web address]  Will anyone else be joining your video visit? Yes: Willard. How would they like to receive their invitation? Other e-mail: [email or web address]      Raquel PA    Video-Visit Details    Video Start Time:

## 2022-09-23 NOTE — PROGRESS NOTES
VIDEO VISIT    HISTORY OF PRESENT ILLNESS:  Today, I had a video visit with Willard Greenwood that started at 11:50 and ended at 11:55.  I saw Willard in February.  At that time, he was diagnosed with an incidental lipomatous mass in his left retroperitoneum.  It was asymptomatic.  He underwent a biopsy which demonstrated a lipomatous mass.  There was no MDM2 amplification.  He had a followup CT scan this week which demonstrated stability of the retroperitoneal mass.  There was also a questionable liver lesion.  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  He remains asymptomatic from his left retroperitoneal lipoma.    IMPRESSION:  Benign retroperitoneal lipomatous mass.    PLAN:  We will obtain a CT scan in 6 months' time and we will reevaluate the lipomatous mass as well as the liver lesions at that time.  If everything looks unchanged, then we will stop imaging on him.    Total time was 20 minutes which included reviewing his most recent imaging, the video consult and coordinating his care.

## 2022-09-27 ENCOUNTER — PATIENT OUTREACH (OUTPATIENT)
Dept: ONCOLOGY | Facility: CLINIC | Age: 42
End: 2022-09-27

## 2022-09-27 ENCOUNTER — TELEPHONE (OUTPATIENT)
Dept: FAMILY MEDICINE | Facility: CLINIC | Age: 42
End: 2022-09-27

## 2022-09-27 NOTE — PROGRESS NOTES
Willard was scheduled for a follow up appointment with Dr. Amato today that he did not show up for. Writer will send a letter to his home with instructions on how to reschedule.    Chloe Davenport RN on 9/27/2022 at 4:22 PM

## 2022-09-27 NOTE — TELEPHONE ENCOUNTER
Prior Authorization Retail Medication Request    Medication/Dose: liraglutide - Weight Management (SAXENDA) 18 MG/3ML pen  ICD code (if different than what is on RX):    Previously Tried and Failed: None   Rationale:      Insurance Name:  [employer]  Insurance ID:  [id number]      Pharmacy Information (if different than what is on RX)  Name:    Phone:        Santiago AGUILAR

## 2022-09-27 NOTE — LETTER
"    September 27, 2022       TO: [place] Greenwood  [address]  [place] [address]         Dear Mr. Greenwood,    We missed you at your appointment at [facility]. We have several options to help you reschedule your appointment:      Call [phone]    Visit our website at [email or web address] and click \"I Want To\" (in upper right) and select Request an Appointment    Request an appointment via [employer] at [email or web address]     We are committed to providing you with exceptional care and service. It's important that our patients can get appointments when they need them, so we ask that you make every effort to consistently attend scheduled appointments and give us notice when you need to cancel an appointment.    If financial concerns have kept you from your appointment, we want to help. Please call the financial counselor at [phone] for assistance.      Sincerely,      [facility]  "

## 2022-09-28 NOTE — TELEPHONE ENCOUNTER
Central Prior Authorization Team - Phone: [phone]   Prior Authorization Approval    Authorization Effective Date: 8/29/2022  Authorization Expiration Date: 9/28/2023  Medication: liraglutide - Weight Management (SAXENDA) 18 MG/3ML pen- [place] APPROVED  Approved Dose/Quantity: 15  Reference #:     Insurance Company:    Expected CoPay:       CoPay Card Available:      Foundation Assistance Needed:    Which Pharmacy is filling the prescription (Not needed for infusion/clinic administered): [facility] - [place], [place] - [address]  Pharmacy Notified: Yes  Patient Notified: YesComment:  pharmacy will notify when ready

## 2022-09-28 NOTE — TELEPHONE ENCOUNTER
Central Prior Authorization Team - Phone: [phone]     PA Initiation    Medication: liraglutide - Weight Management (SAXENDA) 18 MG/3ML pen  Insurance Company:    Pharmacy Filling the Rx: [facility], [facility] - [place], [place] - [address]  Filling Pharmacy Phone: [phone]  Filling Pharmacy Fax:    Start Date: 9/28/2022

## 2022-09-28 NOTE — PROGRESS NOTES
[facility]    Hematology/Oncology Established Patient Note      Today's Date: 9/29/22    Reason for Consultation: Elevated WBC  Referring Provider: Kory Hudson MD      HISTORY OF PRESENT ILLNESS: Kimo Greenwood is a 41 year old male who is referred for elevated WBC, anemia, splenomegaly. 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    Patient has evidence of chronic, intermittent leukocytosis since 2012. Specifically, he has had neutrophilia. On 4/22/22, WBC was 11.1, ANC 9.7. On 4/27/22, WBC was 8.0, ANC 4.8. Patient also has evidence of chronic, intermittent NCNC anemia. Record review shows gillian of 9.1 in 2017. Patient and mother unable to recall the events of that timeframe.     Of note, patient was hospitalized for pneumonia earlier this year. Per record review, an incidental mass was noted an 8.7 cm lipomatous mass in the retroperitoneum near the kidney and spleen. He was evaluated by surgical oncology, Dr. Ojeda, with biopsy on 3/8/22 returning as lipomatous tumor/liposarcoma, negative for MDM2. He is due for follow up imaging in September 2022 with follow in surg onc clinic.    A repeat CT chest was done on 3/23/22, which returned with significant improvement RLL pneumonia. It continued to show the 8.7 x 7.9 x 9.3 cm RP mass in the LUQ. The spleen was enlarged to 15.5 cm. No other adenopathy.     Patient is present with mother. They report only new medication is liraglutide for weight management. No weight loss, fever, drenching night sweats, or LAD.     Of note, patient lives at [facility]. He is able to perform ADLs. He does smoke cigarettes.       INTERIM HISTORY:  Most recent scans showed stable RP mass, but new liver lesions. No unintentional weight loss, abdominal pain, fever/drenching night sweats per patient. No change in plan  per Dr. Ojeda.     He is looking forward to the hunting season.      REVIEW OF SYSTEMS:   A 14 point ROS was reviewed with pertinent positives and negatives in the HPI.        HOME MEDICATIONS:  Current Outpatient Medications   Medication Sig Dispense Refill     albuterol (VENTOLIN HFA) 108 (90 Base) MCG/ACT inhaler INHALE 2 PUFFS INTO LUNGS EVERY SIX HOURS AS NEEDED FOR SHORTNESS OF BREATH / DYSPNEA OR WHEEZING 18 g 1     DULoxetine (CYMBALTA) 60 MG capsule Take 120 mg by mouth daily        FIBER- MG tablet TAKE 2 TABLETS BY MOUTH TWICE A DAY (Patient taking differently: Take 2 tablets by mouth 2 times daily) 360 tablet 3     hydrochlorothiazide (HYDRODIURIL) 25 MG tablet TAKE 1 TABLET BY MOUTH ONCE DAILY 30 tablet 5     insulin pen needle (31G X 6 MM) 31G X 6 MM miscellaneous Use 1 pen needles daily or as directed. 100 each 0     Lidocaine (LIDOCARE) 4 % Patch Place 1-2 patches onto the skin every 24 hours To prevent lidocaine toxicity, patient should be patch free for 12 hrs daily. (Patient not taking: Reported on 9/23/2022) 60 patch 1     liraglutide - Weight Management (SAXENDA) 18 MG/3ML pen Inject 3 mg subcutaneously daily. 15 mL 1     lisinopril (ZESTRIL) 10 MG tablet TAKE 1 TABLET BY MOUTH EVERY MORNING 30 tablet 5     mirabegron (MYRBETRIQ) 50 MG 24 hr tablet Take 1 tablet by mouth daily       naproxen sodium 220 MG capsule If Tylenol is not helpful, this can be added 2 tabs twice a day with meals. (Patient taking differently: Take 440 mg by mouth 2 times daily (with meals)) 60 capsule 3     OLANZapine (ZYPREXA) 10 MG tablet Take 1 tablet (10 mg) by mouth At Bedtime (Patient not taking: Reported on 9/23/2022) 30 tablet 0     OLANZapine-samidorphan (LYBALVI) 10-10 MG TABS tablet Take 1 tablet by mouth At Bedtime       order for DME Equipment being ordered: CPAP supplies 1 each 0     oxybutynin ER (DITROPAN XL) 15 MG 24 hr tablet Take 30 mg by mouth daily       pantoprazole (PROTONIX) 40 MG EC tablet  TAKE 1 TABLET BY MOUTH ONCE DAILY 30-60 MINUTES BR FIRST MEAL OF THE DAY 30 tablet 5     prazosin (MINIPRESS) 1 MG capsule Take 3 mg by mouth At Bedtime       VITAMIN D3 50 MCG (2000 UT) tablet TAKE ONE TABLET BY MOUTH EVERY DAY 90 tablet 3         ALLERGIES:  Allergies   Allergen Reactions     Other [No Clinical Screening - See Comments] Other (See Comments)     Awoke from anesthesia with anger and ripping off dressing, after interstim placement, outside hospital 2013         PAST MEDICAL HISTORY:  Past Medical History:   Diagnosis Date     Arthritis     DDD hips and knees     Asthma      Asthma      Patel's esophagus      Chronic pain      Chronic pain - left hip/leg pain     degenerative disc disease, back, hips, knees     Gastroesophageal reflux disease      History of anesthesia complications     agitation x1 after interstim 2013     Hypertension      Hypertension      Leukocytosis      LPRD (laryngopharyngeal reflux disease)      Marijuana abuse      Marijuana abuse      MDD (major depressive disorder)      MDD (major depressive disorder)      Mental retardation, mild (I.Q. 50-70)      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    With associated speech/language delay     Obesity 11/11/2010     LOREN (obstructive sleep apnea)     Uses a CPAP     LOREN (obstructive sleep apnea)      Seborrheic dermatitis      Seborrheic dermatitis      Sleep apnea     CPAP         PAST SURGICAL HISTORY:  Past Surgical History:   Procedure Laterality Date     ARTHROPLASTY HIP Left 1/25/2017    Procedure: ARTHROPLASTY HIP;  Surgeon: Bala Randall MD;  Location: [facility] OR     ARTHROPLASTY HIP Left      ARTHROPLASTY REVISION HIP Left 6/24/2019    Procedure: Revision left total hip arthroplasty with a head and liner exchange to a Biomet dual mobility head and liner.;  Surgeon: Bala Randall MD;  Location: [facility] OR     BLADDER SURGERY      Alida Ibarra,Interstem stimulator implant     CLOSED REDUCTION HIP  Left 6/10/2019    Procedure: Closed reduction under general anesthesia left recurrent prosthetic hip dislocation;  Surgeon: Rafat Cazares MD;  Location: [facility] OR     COLONOSCOPY N/A 10/30/2015    Procedure: COMBINED COLONOSCOPY, SINGLE OR MULTIPLE BIOPSY/POLYPECTOMY BY BIOPSY;  Surgeon: Alexis Jackson MD;  Location: [facility] GI     COLONOSCOPY N/A 11/17/2016    Procedure: COMBINED COLONOSCOPY, SINGLE OR MULTIPLE BIOPSY/POLYPECTOMY BY BIOPSY;  Surgeon: Cat Huber MD;  Location: [facility]     COLONOSCOPY N/A 10/28/2020    Procedure: COLONOSCOPY;  Surgeon: You Kraus MD;  Location: [facility] OR     COLONOSCOPY       ESOPHAGOSCOPY, GASTROSCOPY, DUODENOSCOPY (EGD), COMBINED N/A 11/17/2016    Procedure: COMBINED ESOPHAGOSCOPY, GASTROSCOPY, DUODENOSCOPY (EGD), BIOPSY SINGLE OR MULTIPLE;  Surgeon: Cat Huber MD;  Location: [facility]     ESOPHAGOSCOPY, GASTROSCOPY, DUODENOSCOPY (EGD), COMBINED N/A 3/12/2020    Procedure: ESOPHAGOGASTRODUODENOSCOPY WITH BIOPSIES;  Surgeon: You Kraus MD;  Location: [facility] OR     ESOPHAGOSCOPY, GASTROSCOPY, DUODENOSCOPY (EGD), COMBINED N/A 10/28/2020    Procedure: ESOPHAGOGASTRODUODENOSCOPY, WITH BIOPSY;  Surgeon: You Kraus MD;  Location: [facility] OR     ESOPHAGOSCOPY, GASTROSCOPY, DUODENOSCOPY (EGD), COMBINED       EXAM UNDER ANESTHESIA, FULGURATE CONDYLOMA ANUS, COMBINED N/A 12/22/2016    Procedure: COMBINED EXAM UNDER ANESTHESIA, FULGURATE CONDYLOMA ANUS;  Surgeon: Nya Cabello MD;  Location: [facility] OR     GENITOURINARY SURGERY       JOINT REPLACEMENT Left 2017    MARGO, Revision Left MARGO     OTHER SURGICAL HISTORY      interstim stimulator implant     IA CYSTOURETHROSCOPY INJ CHEMODENERVATION BLADDER N/A 1/16/2019    Procedure: CYSTOSCOPY BOTOX BLADDER INJECTIONS (100 UNITS IN 10CC);  Surgeon: Didier Ibarra MD;  Location: [facility] OR;  Service: Urology     IA IMPLANT PERIPH/GASTRIC NEUROSTIM/ N/A 1/8/2020    Procedure: NEW INTERSTIM  "LEAD, REPLACE OLD; NEW INTERSTIM BATTERY, REPLACE OLD;  Surgeon: Didier Ibarra MD;  Location: [facility] OR;  Service: Urology         SOCIAL HISTORY:  Social History     Socioeconomic History     Marital status: Single     Spouse name: Not on file     Number of children: Not on file     Years of education: Not on file     Highest education level: Not on file   Occupational History     Not on file   Tobacco Use     Smoking status: Current Every Day Smoker     Packs/day: 1.00     Years: 1.00     Pack years: 1.00     Types: Vaping Device     Smokeless tobacco: Current User     Types: Chew     Tobacco comment: Smokes E Cigs equal to 1 PPD   Vaping Use     Vaping Use: Every day     Substances: Nicotine, Flavoring     Devices: Refillable tank   Substance and Sexual Activity     Alcohol use: Yes     Comment: socially--\"not very often\" \"once a month\"     Drug use: No     Comment: patient denies any marijuana use     Sexual activity: Yes     Partners: Female     Birth control/protection: Condom   Other Topics Concern     Parent/sibling w/ CABG, MI or angioplasty before 65F 55M? No   Social History Narrative     Not on file     Social Determinants of Health     Financial Resource Strain: Not on file   Food Insecurity: Not on file   Transportation Needs: Not on file   Physical Activity: Not on file   Stress: Not on file   Social Connections: Not on file   Intimate Partner Violence: Not At Risk     Fear of Current or Ex-Partner: No     Emotionally Abused: No     Physically Abused: No     Sexually Abused: No   Housing Stability: Not on file         FAMILY HISTORY:  Family History   Problem Relation Age of Onset     Anxiety Disorder Mother      Depression Mother      Ulcerative Colitis Mother 18     Breast Cancer Maternal Grandmother      Cerebrovascular Disease Maternal Grandmother      Breast Cancer Maternal Aunt      Cancer Maternal Grandfather         grt uncles colon cancer         PHYSICAL EXAM:  Vital signs:  BP " "139/85   Pulse 84   Temp 97.5  F (36.4  C) (Tympanic)   Resp 16   Ht 1.791 m (5' 10.5\")   Wt (!) 176 kg (388 lb)   SpO2 96%   BMI 54.89 kg/m       GENERAL/CONSTITUTIONAL: No acute distress.  LYMPH: No anterior cervical, posterior cervical, supraclavicular, axillary or inguinal adenopathy.   RESPIRATORY: Clear to auscultation bilaterally. No crackles or wheezing.   CARDIOVASCULAR: Regular rate and rhythm without murmurs, gallops, or rubs.  GASTROINTESTINAL: No hepatosplenomegaly, masses, or tenderness. The patient has normal bowel sounds. No guarding.  No distention.  MUSCULOSKELETAL: Warm and well-perfused, no cyanosis, clubbing, or edema.   NEUROLOGIC: Cranial nerves II-XII are intact. Alert, oriented, answers questions appropriately.  INTEGUMENTARY: No rashes or jaundice.  GAIT: Antalgic gait.      LABS:   Latest Reference Range & Units 08/25/22 08:36 09/29/22 10:28   WBC 4.0 - 11.0 10e3/uL 11.4 (H) 10.5   Hemoglobin 13.3 - 17.7 g/dL 12.5 (L) 12.9 (L)   Hematocrit 40.0 - 53.0 % 39.3 (L) 40.4   Platelet Count 150 - 450 10e3/uL 296 256   RBC Count 4.40 - 5.90 10e6/uL 4.56 4.50   MCV 78 - 100 fL 86 90   MCH 26.5 - 33.0 pg 27.4 28.7   MCHC 31.5 - 36.5 g/dL 31.8 31.9   RDW 10.0 - 15.0 % 13.2 13.2   % Neutrophils % 69 72   % Lymphocytes % 23 18   % Monocytes % 7 8   % Eosinophils % 1 1   % Basophils % 0 0   Absolute Basophils 0.0 - 0.2 10e3/uL 0.0 0.0   Absolute Eosinophils 0.0 - 0.7 10e3/uL 0.2 0.1   Absolute Immature Granulocytes <=0.4 10e3/uL 0.1 0.1   Absolute Lymphocytes 0.8 - 5.3 10e3/uL 2.6 1.9   Absolute Monocytes 0.0 - 1.3 10e3/uL 0.8 0.8   % Immature Granulocytes % 0 1   Absolute Neutrophils 1.6 - 8.3 10e3/uL 7.8 7.6   Absolute NRBCs 10e3/uL 0.0 0.0   NRBCs per 100 WBC <1 /100 0 0   % Retic 0.5 - 2.0 %  2.3 (H)   Absolute Retic 0.025 - 0.095 10e6/uL  0.101 (H)       PATHOLOGY:  Surgical Pathology Report                         Case: [id number]                                   Authorizing Provider: "  Star Ojeda MD         Collected:           03/08/2022 11:21 AM           Ordering Location:     [facility]   Received:            03/08/2022 01:49 PM                                  Imaging                                                                       Pathologist:           Heather Sidhu MD                                                            Specimen:    Retroperitoneal, Retroperitoneal Biopsy                                                    Final Diagnosis   Retroperitoneal mass, CT-guided needle biopsy:  -Mature adipose tissue, consistent with lipomatous tumor.  Limited sample.  See COMMENT.   Electronically signed by Heather Sidhu MD on 3/14/2022 at  2:36 PM   Comment    The morphologic findings are concerning for liposarcoma.  The biopsy is limited in nature, comprising of needle biopsy cores of lipomatous tissue.  The biopsy tissue may not be representative of the radiographically identified large (reported 8.7 cm) mass.  Fluorescence in-situ hybridization (FISH) for MDM2 probe is pending at this time (please see separate forthcoming [facility] cytogenetics laboratory report for results).       INTERPRETATION:  None (0%) of the interphase cells examined had a signal pattern indicative of amplification of MDM2.      Final Diagnosis 7/21/22:   Peripheral blood for morphology:  -Mild normochromic, normocytic anemia without evidence of red cell regeneration  -Leukocytosis with mature neutrophilia; leukocytes without morphologic abnormalities     Final Diagnosis 8/25/22:   Peripheral blood for morphology:  -Mild normochromic, normocytic anemia without evidence of red cell regeneration  -Leukocytosis with mature neutrophilia; leukocytes without morphologic abnormalities       Case Report   Flow Cytometry Report                             Case: [id number]                                   Authorizing Provider:  Danna Amato DO         Collected:            08/25/2022 08:36 AM           Ordering Location:     [facility]   Received:            08/25/2022 09:33 AM                                  [facility]                                                             Pathologist:           Denise Lee MD                                                     Specimen:    Peripheral Blood                                                                           Flow Interpretation   A. Peripheral Blood:  -Polytypic B cells  -No aberrant immunophenotype on T cells  -Rare to absent CD34-positive blasts  -See comment   Electronically signed by Denise Lee MD on 8/26/2022 at 12:10 PM   Comment    There is no immunophenotypic evidence of non-Hodgkin lymphoma, lymphoid leukemia, or high grade myeloid neoplasm.         IMAGING:  CT CHEST WITHOUT CONTRAST March 23, 2022  FINDINGS:   Chest/mediastinum: No cardiomegaly. Small pericardial effusion. No  significant mediastinal, hilar or axillary lymphadenopathy. Asymmetric  elevation of the left hemidiaphragm as compared to the right.     Lung/pleura: Trace left pleural effusion. No significant right pleural  effusion. No significant pneumothorax. Significant interval  improvement in the previously seen right lower lobe consolidative  pulmonary opacities as compared to 2/19/2022 outside CT. Only small  patchy foci of opacity in the medial aspect of the right lower lobe is  seen.      Upper abdomen: Limited evaluation of the upper abdomen due to lack of  coverage and contrast. Cholelithiasis without CT evidence of acute  cholecystitis. There is approximately 8.7 x 7.9 x 9.3 cm (axial and CC  dimensions, respectively), fatty area with minimal fat stranding in  the left upper quadrant retroperitoneal space just above the superior  pole of the kidney and abutting the adjacent adrenal gland, kidney,  pancreas and spleen. The spleen is enlarged measuring 15.5 cm in  craniocaudal dimension.     Bones  and soft tissue: Multilevel degenerative changes of the spine.  Diffuse heterogenous appearance of the spine of indeterminate  etiology.                                                                      IMPRESSION:    1. Significant interval improvement in the previously seen right lower  lobe consolidative pulmonary opacity as compared to 2/19/2022 outside  CT. Only small patchy opacity in the medial aspect of the right lower  lobe remaining.  2. Small pericardial effusion.  3. Trace left pleural effusion.  4. There is approximately 8.7 x 7.9 x 9.3 cm fat attenuating area with  minimal fat stranding in the left upper quadrant, consistent with the  biopsy-proven lipomatous tumor.  5. Cholelithiasis without CT evidence of acute cholecystitis.  6. Splenomegaly.  7. Multilevel degenerative changes of the spine with diffuse  heterogenous appearance of the thoracic spine of indeterminate  Etiology.    CT AP 9/20/22:  IMPRESSION:   1.  There are two fat-containing left retroperitoneal masses that  appears stable in size. One of these at the left adrenal fossa  contacting the left adrenal and left kidney measures up to 10.5 cm  with ill-defined internal increased density. Another at the left  paraspinous retroperitoneum associated with atrophy of the left psoas  is also stable. The second lesion has hazy internal increased density  as well. This could just be stable complex lipomas. More aggressive  forms of retroperitoneal fat containing mass such as liposarcoma  cannot be entirely excluded. Therefore, recommend a surveillance CT to  establish long-term stability. If there are older studies that  includes this lesion for imaging, these would be useful for review.  Recommend surveillance CT in six to 12 months.  2.  There are two hypodense but indeterminate nodules within the  liver. One of these appears stable, but another is new at the right  hepatic dome compared to 2/19/2022. Therefore, recommend liver MRI  for  further imaging workup.  3.  Cholelithiasis.      ASSESSMENT/PLAN:  Kimo Greenwood is a 41 year old male who is referred for elevated WBC, anemia, splenomegaly. 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    1) Chronic anemia and intermittent neutrophilia  -Patient has evidence of chronic, intermittent neutrophilia and NC/NC anemia.  -Asymptomatic.  -Repeat CBC in July 2022 with persistent neutrophilia and NC/NC anemia. Negative for dysplasia or evidence of blasts.   -Iron studies, B12, folate, TSH WNL in July 2022.  -FLOW cytometry normal 7/2022.  -CT CAP is showing normal spleen size.   -Current CBC showing resolution of neutrophilia. Stable hemoglobin at 12.9.   -MPN NGS on peripheral blood pending.  -Hold on bone marrow biopsy at this time.    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  -9/2022 scans show stability.  -He is planned for repeat imaging in 6 months.  -Followed by Dr. Ojeda.    3) Liver lesions  -MRI liver ordered.     4) History of MDD    5) History of HTN, LOREN    6) Follow up in 2 months with results of MRI liver and CBC.       Danna Amato DO  Hematology/Oncology  [facility]

## 2022-09-29 ENCOUNTER — ONCOLOGY VISIT (OUTPATIENT)
Dept: ONCOLOGY | Facility: CLINIC | Age: 42
End: 2022-09-29
Attending: INTERNAL MEDICINE
Payer: COMMERCIAL

## 2022-09-29 VITALS
DIASTOLIC BLOOD PRESSURE: 85 MMHG | BODY MASS INDEX: 44.1 KG/M2 | SYSTOLIC BLOOD PRESSURE: 139 MMHG | HEART RATE: 84 BPM | RESPIRATION RATE: 16 BRPM | TEMPERATURE: 97.5 F | HEIGHT: 71 IN | OXYGEN SATURATION: 96 % | WEIGHT: 315 LBS

## 2022-09-29 DIAGNOSIS — D72.829 LEUKOCYTOSIS, UNSPECIFIED TYPE: Primary | ICD-10-CM

## 2022-09-29 DIAGNOSIS — D72.828 NEUTROPHILIA: ICD-10-CM

## 2022-09-29 DIAGNOSIS — D64.9 ANEMIA, UNSPECIFIED TYPE: ICD-10-CM

## 2022-09-29 DIAGNOSIS — K76.9 LIVER LESION: ICD-10-CM

## 2022-09-29 LAB
BASOPHILS # BLD AUTO: 0 10E3/UL (ref 0–0.2)
BASOPHILS NFR BLD AUTO: 0 %
EOSINOPHIL # BLD AUTO: 0.1 10E3/UL (ref 0–0.7)
EOSINOPHIL NFR BLD AUTO: 1 %
ERYTHROCYTE [DISTWIDTH] IN BLOOD BY AUTOMATED COUNT: 13.2 % (ref 10–15)
HCT VFR BLD AUTO: 40.4 % (ref 40–53)
HGB BLD-MCNC: 12.9 G/DL (ref 13.3–17.7)
IMM GRANULOCYTES # BLD: 0.1 10E3/UL
IMM GRANULOCYTES NFR BLD: 1 %
INTERPRETATION: NORMAL
LYMPHOCYTES # BLD AUTO: 1.9 10E3/UL (ref 0.8–5.3)
LYMPHOCYTES NFR BLD AUTO: 18 %
MCH RBC QN AUTO: 28.7 PG (ref 26.5–33)
MCHC RBC AUTO-ENTMCNC: 31.9 G/DL (ref 31.5–36.5)
MCV RBC AUTO: 90 FL (ref 78–100)
MONOCYTES # BLD AUTO: 0.8 10E3/UL (ref 0–1.3)
MONOCYTES NFR BLD AUTO: 8 %
NEUTROPHILS # BLD AUTO: 7.6 10E3/UL (ref 1.6–8.3)
NEUTROPHILS NFR BLD AUTO: 72 %
NRBC # BLD AUTO: 0 10E3/UL
NRBC BLD AUTO-RTO: 0 /100
PLATELET # BLD AUTO: 256 10E3/UL (ref 150–450)
RBC # BLD AUTO: 4.5 10E6/UL (ref 4.4–5.9)
RETICS # AUTO: 0.1 10E6/UL (ref 0.03–0.1)
RETICS/RBC NFR AUTO: 2.3 % (ref 0.5–2)
SIGNIFICANT RESULTS: NORMAL
SPECIMEN DESCRIPTION: NORMAL
TEST DETAILS, MDL: NORMAL
WBC # BLD AUTO: 10.5 10E3/UL (ref 4–11)

## 2022-09-29 PROCEDURE — 81450 HL NEO GSAP 5-50DNA/DNA&RNA: CPT | Performed by: INTERNAL MEDICINE

## 2022-09-29 PROCEDURE — 36415 COLL VENOUS BLD VENIPUNCTURE: CPT | Performed by: INTERNAL MEDICINE

## 2022-09-29 PROCEDURE — G0452 MOLECULAR PATHOLOGY INTERPR: HCPCS | Mod: 26 | Performed by: PATHOLOGY

## 2022-09-29 PROCEDURE — 85025 COMPLETE CBC W/AUTO DIFF WBC: CPT | Performed by: INTERNAL MEDICINE

## 2022-09-29 PROCEDURE — 85045 AUTOMATED RETICULOCYTE COUNT: CPT | Performed by: INTERNAL MEDICINE

## 2022-09-29 PROCEDURE — 99214 OFFICE O/P EST MOD 30 MIN: CPT | Performed by: INTERNAL MEDICINE

## 2022-09-29 ASSESSMENT — PAIN SCALES - GENERAL: PAINLEVEL: EXTREME PAIN (9)

## 2022-09-29 NOTE — NURSING NOTE
"Oncology Rooming Note    September 29, 2022 9:37 AM   Kimo Greenwood is a 41 year old male who presents for:    Chief Complaint   Patient presents with     Oncology Clinic Visit     Leukocytosis, unspecified type     Initial Vitals: /85   Pulse 84   Temp 97.5  F (36.4  C) (Tympanic)   Resp 16   Ht 1.791 m (5' 10.5\")   Wt (!) 176 kg (388 lb)   SpO2 96%   BMI 54.89 kg/m   Estimated body mass index is 54.89 kg/m  as calculated from the following:    Height as of this encounter: 1.791 m (5' 10.5\").    Weight as of this encounter: 176 kg (388 lb). Body surface area is 2.96 meters squared.  Extreme Pain (9) Comment: Data Unavailable   No LMP for male patient.  Allergies reviewed: Yes  Medications reviewed: Yes    Medications: Medication refills not needed today.  Pharmacy name entered into Measy:    [id number] - [place], [place] - [address] [facility] [place] - [address] [facility]. - [place], [place] - [address]    Clinical concerns: follow up       Chelsi Broderick CMA              "

## 2022-09-29 NOTE — LETTER
9/29/2022         RE: Kimo Greenwood  [address]  [place] [address]        Dear Colleague,    Thank you for referring your patient, Kimo Greenwood, to the [facility]. Please see a copy of my visit note below.    [facility]    Hematology/Oncology Established Patient Note      Today's Date: 9/29/22    Reason for Consultation: Elevated WBC  Referring Provider: Kory Hudson MD      HISTORY OF PRESENT ILLNESS: Kimo Greenwood is a 41 year old male who is referred for elevated WBC, anemia, splenomegaly. 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    Patient has evidence of chronic, intermittent leukocytosis since 2012. Specifically, he has had neutrophilia. On 4/22/22, WBC was 11.1, ANC 9.7. On 4/27/22, WBC was 8.0, ANC 4.8. Patient also has evidence of chronic, intermittent NCNC anemia. Record review shows gillian of 9.1 in 2017. Patient and mother unable to recall the events of that timeframe.     Of note, patient was hospitalized for pneumonia earlier this year. Per record review, an incidental mass was noted an 8.7 cm lipomatous mass in the retroperitoneum near the kidney and spleen. He was evaluated by surgical oncology, Dr. Ojeda, with biopsy on 3/8/22 returning as lipomatous tumor/liposarcoma, negative for MDM2. He is due for follow up imaging in September 2022 with follow in surg onc clinic.    A repeat CT chest was done on 3/23/22, which returned with significant improvement RLL pneumonia. It continued to show the 8.7 x 7.9 x 9.3 cm RP mass in the LUQ. The spleen was enlarged to 15.5 cm. No other adenopathy.     Patient is present with mother. They report only new medication is liraglutide for weight management. No weight loss, fever, drenching night sweats, or LAD.     Of note,  patient lives at [facility]. He is able to perform ADLs. He does smoke cigarettes.       INTERIM HISTORY:  Most recent scans showed stable RP mass, but new liver lesions. No unintentional weight loss, abdominal pain, fever/drenching night sweats per patient. No change in plan per Dr. Ojeda.     He is looking forward to the hunting season.      REVIEW OF SYSTEMS:   A 14 point ROS was reviewed with pertinent positives and negatives in the HPI.        HOME MEDICATIONS:  Current Outpatient Medications   Medication Sig Dispense Refill     albuterol (VENTOLIN HFA) 108 (90 Base) MCG/ACT inhaler INHALE 2 PUFFS INTO LUNGS EVERY SIX HOURS AS NEEDED FOR SHORTNESS OF BREATH / DYSPNEA OR WHEEZING 18 g 1     DULoxetine (CYMBALTA) 60 MG capsule Take 120 mg by mouth daily        FIBER- MG tablet TAKE 2 TABLETS BY MOUTH TWICE A DAY (Patient taking differently: Take 2 tablets by mouth 2 times daily) 360 tablet 3     hydrochlorothiazide (HYDRODIURIL) 25 MG tablet TAKE 1 TABLET BY MOUTH ONCE DAILY 30 tablet 5     insulin pen needle (31G X 6 MM) 31G X 6 MM miscellaneous Use 1 pen needles daily or as directed. 100 each 0     Lidocaine (LIDOCARE) 4 % Patch Place 1-2 patches onto the skin every 24 hours To prevent lidocaine toxicity, patient should be patch free for 12 hrs daily. (Patient not taking: Reported on 9/23/2022) 60 patch 1     liraglutide - Weight Management (SAXENDA) 18 MG/3ML pen Inject 3 mg subcutaneously daily. 15 mL 1     lisinopril (ZESTRIL) 10 MG tablet TAKE 1 TABLET BY MOUTH EVERY MORNING 30 tablet 5     mirabegron (MYRBETRIQ) 50 MG 24 hr tablet Take 1 tablet by mouth daily       naproxen sodium 220 MG capsule If Tylenol is not helpful, this can be added 2 tabs twice a day with meals. (Patient taking differently: Take 440 mg by mouth 2 times daily (with meals)) 60 capsule 3     OLANZapine (ZYPREXA) 10 MG tablet Take 1 tablet (10 mg) by mouth At Bedtime (Patient not taking: Reported on 9/23/2022) 30 tablet 0      OLANZapine-samidorphan (LYBALVI) 10-10 MG TABS tablet Take 1 tablet by mouth At Bedtime       order for DME Equipment being ordered: CPAP supplies 1 each 0     oxybutynin ER (DITROPAN XL) 15 MG 24 hr tablet Take 30 mg by mouth daily       pantoprazole (PROTONIX) 40 MG EC tablet TAKE 1 TABLET BY MOUTH ONCE DAILY 30-60 MINUTES BR FIRST MEAL OF THE DAY 30 tablet 5     prazosin (MINIPRESS) 1 MG capsule Take 3 mg by mouth At Bedtime       VITAMIN D3 50 MCG (2000 UT) tablet TAKE ONE TABLET BY MOUTH EVERY DAY 90 tablet 3         ALLERGIES:  Allergies   Allergen Reactions     Other [No Clinical Screening - See Comments] Other (See Comments)     Awoke from anesthesia with anger and ripping off dressing, after interstim placement, outside hospital 2013         PAST MEDICAL HISTORY:  Past Medical History:   Diagnosis Date     Arthritis     DDD hips and knees     Asthma      Asthma      Patel's esophagus      Chronic pain      Chronic pain - left hip/leg pain     degenerative disc disease, back, hips, knees     Gastroesophageal reflux disease      History of anesthesia complications     agitation x1 after interstim 2013     Hypertension      Hypertension      Leukocytosis      LPRD (laryngopharyngeal reflux disease)      Marijuana abuse      Marijuana abuse      MDD (major depressive disorder)      MDD (major depressive disorder)      Mental retardation, mild (I.Q. 50-70)      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    With associated speech/language delay     Obesity 11/11/2010     LOREN (obstructive sleep apnea)     Uses a CPAP     LOREN (obstructive sleep apnea)      Seborrheic dermatitis      Seborrheic dermatitis      Sleep apnea     CPAP         PAST SURGICAL HISTORY:  Past Surgical History:   Procedure Laterality Date     ARTHROPLASTY HIP Left 1/25/2017    Procedure: ARTHROPLASTY HIP;  Surgeon: Bala Randall MD;  Location: [facility] OR     ARTHROPLASTY HIP Left      ARTHROPLASTY REVISION HIP Left 6/24/2019     Procedure: Revision left total hip arthroplasty with a head and liner exchange to a Biomet dual mobility head and liner.;  Surgeon: Bala Randall MD;  Location: [facility] OR     BLADDER SURGERY      Ibarra, [facility],Interstem stimulator implant     CLOSED REDUCTION HIP Left 6/10/2019    Procedure: Closed reduction under general anesthesia left recurrent prosthetic hip dislocation;  Surgeon: Rafat Cazares MD;  Location: [facility] OR     COLONOSCOPY N/A 10/30/2015    Procedure: COMBINED COLONOSCOPY, SINGLE OR MULTIPLE BIOPSY/POLYPECTOMY BY BIOPSY;  Surgeon: Alexis Jackson MD;  Location: [facility] GI     COLONOSCOPY N/A 11/17/2016    Procedure: COMBINED COLONOSCOPY, SINGLE OR MULTIPLE BIOPSY/POLYPECTOMY BY BIOPSY;  Surgeon: Cat Huber MD;  [facility]     COLONOSCOPY N/A 10/28/2020    Procedure: COLONOSCOPY;  Surgeon: You Kraus MD;  Location: [facility] OR     COLONOSCOPY       ESOPHAGOSCOPY, GASTROSCOPY, DUODENOSCOPY (EGD), COMBINED N/A 11/17/2016    Procedure: COMBINED ESOPHAGOSCOPY, GASTROSCOPY, DUODENOSCOPY (EGD), BIOPSY SINGLE OR MULTIPLE;  Surgeon: Cat Huber MD;  [facility]     ESOPHAGOSCOPY, GASTROSCOPY, DUODENOSCOPY (EGD), COMBINED N/A 3/12/2020    Procedure: ESOPHAGOGASTRODUODENOSCOPY WITH BIOPSIES;  Surgeon: You Kraus MD;  Location: [facility] OR     ESOPHAGOSCOPY, GASTROSCOPY, DUODENOSCOPY (EGD), COMBINED N/A 10/28/2020    Procedure: ESOPHAGOGASTRODUODENOSCOPY, WITH BIOPSY;  Surgeon: You Kraus MD;  Location: [facility] OR     ESOPHAGOSCOPY, GASTROSCOPY, DUODENOSCOPY (EGD), COMBINED       EXAM UNDER ANESTHESIA, FULGURATE CONDYLOMA ANUS, COMBINED N/A 12/22/2016    Procedure: COMBINED EXAM UNDER ANESTHESIA, FULGURATE CONDYLOMA ANUS;  Surgeon: Nya Cabello MD;  [facility]     GENITOURINARY SURGERY       JOINT REPLACEMENT Left 2017    MARGO, Revision Left MARGO     OTHER SURGICAL HISTORY      interstim stimulator implant     GA CYSTOURETHROSCOPY INJ  "CHEMODENERVATION BLADDER N/A 1/16/2019    Procedure: CYSTOSCOPY BOTOX BLADDER INJECTIONS (100 UNITS IN 10CC);  Surgeon: Didier Ibarra MD;  Location: [facility] OR;  Service: Urology     ND IMPLANT PERIPH/GASTRIC NEUROSTIM/ N/A 1/8/2020    Procedure: NEW INTERSTIM LEAD, REPLACE OLD; NEW INTERSTIM BATTERY, REPLACE OLD;  Surgeon: Didier Ibarra MD;  Location: [facility];  Service: Urology         SOCIAL HISTORY:  Social History     Socioeconomic History     Marital status: Single     Spouse name: Not on file     Number of children: Not on file     Years of education: Not on file     Highest education level: Not on file   Occupational History     Not on file   Tobacco Use     Smoking status: Current Every Day Smoker     Packs/day: 1.00     Years: 1.00     Pack years: 1.00     Types: Vaping Device     Smokeless tobacco: Current User     Types: Chew     Tobacco comment: Smokes E Cigs equal to 1 PPD   Vaping Use     Vaping Use: Every day     Substances: Nicotine, Flavoring     Devices: Refillable tank   Substance and Sexual Activity     Alcohol use: Yes     Comment: socially--\"not very often\" \"once a month\"     Drug use: No     Comment: patient denies any marijuana use     Sexual activity: Yes     Partners: Female     Birth control/protection: Condom   Other Topics Concern     Parent/sibling w/ CABG, MI or angioplasty before 65F 55M? No   Social History Narrative     Not on file     Social Determinants of Health     Financial Resource Strain: Not on file   Food Insecurity: Not on file   Transportation Needs: Not on file   Physical Activity: Not on file   Stress: Not on file   Social Connections: Not on file   Intimate Partner Violence: Not At Risk     Fear of Current or Ex-Partner: No     Emotionally Abused: No     Physically Abused: No     Sexually Abused: No   Housing Stability: Not on file         FAMILY HISTORY:  Family History   Problem Relation Age of Onset     Anxiety Disorder Mother      " "Depression Mother      Ulcerative Colitis Mother 18     Breast Cancer Maternal Grandmother      Cerebrovascular Disease Maternal Grandmother      Breast Cancer Maternal Aunt      Cancer Maternal Grandfather         grt uncles colon cancer         PHYSICAL EXAM:  Vital signs:  /85   Pulse 84   Temp 97.5  F (36.4  C) (Tympanic)   Resp 16   Ht 1.791 m (5' 10.5\")   Wt (!) 176 kg (388 lb)   SpO2 96%   BMI 54.89 kg/m       GENERAL/CONSTITUTIONAL: No acute distress.  LYMPH: No anterior cervical, posterior cervical, supraclavicular, axillary or inguinal adenopathy.   RESPIRATORY: Clear to auscultation bilaterally. No crackles or wheezing.   CARDIOVASCULAR: Regular rate and rhythm without murmurs, gallops, or rubs.  GASTROINTESTINAL: No hepatosplenomegaly, masses, or tenderness. The patient has normal bowel sounds. No guarding.  No distention.  MUSCULOSKELETAL: Warm and well-perfused, no cyanosis, clubbing, or edema.   NEUROLOGIC: Cranial nerves II-XII are intact. Alert, oriented, answers questions appropriately.  INTEGUMENTARY: No rashes or jaundice.  GAIT: Antalgic gait.      LABS:   Latest Reference Range & Units 08/25/22 08:36 09/29/22 10:28   WBC 4.0 - 11.0 10e3/uL 11.4 (H) 10.5   Hemoglobin 13.3 - 17.7 g/dL 12.5 (L) 12.9 (L)   Hematocrit 40.0 - 53.0 % 39.3 (L) 40.4   Platelet Count 150 - 450 10e3/uL 296 256   RBC Count 4.40 - 5.90 10e6/uL 4.56 4.50   MCV 78 - 100 fL 86 90   MCH 26.5 - 33.0 pg 27.4 28.7   MCHC 31.5 - 36.5 g/dL 31.8 31.9   RDW 10.0 - 15.0 % 13.2 13.2   % Neutrophils % 69 72   % Lymphocytes % 23 18   % Monocytes % 7 8   % Eosinophils % 1 1   % Basophils % 0 0   Absolute Basophils 0.0 - 0.2 10e3/uL 0.0 0.0   Absolute Eosinophils 0.0 - 0.7 10e3/uL 0.2 0.1   Absolute Immature Granulocytes <=0.4 10e3/uL 0.1 0.1   Absolute Lymphocytes 0.8 - 5.3 10e3/uL 2.6 1.9   Absolute Monocytes 0.0 - 1.3 10e3/uL 0.8 0.8   % Immature Granulocytes % 0 1   Absolute Neutrophils 1.6 - 8.3 10e3/uL 7.8 7.6   Absolute " NRBCs 10e3/uL 0.0 0.0   NRBCs per 100 WBC <1 /100 0 0   % Retic 0.5 - 2.0 %  2.3 (H)   Absolute Retic 0.025 - 0.095 10e6/uL  0.101 (H)       PATHOLOGY:  Surgical Pathology Report                         Case: [id number]                                   Authorizing Provider:  Star Ojeda MD         Collected:           03/08/2022 11:21 AM           Ordering Location:     [facility]   Received:            03/08/2022 01:49 PM                                  Imaging                                                                       Pathologist:           Heather Sidhu MD                                                            Specimen:    Retroperitoneal, Retroperitoneal Biopsy                                                    Final Diagnosis   Retroperitoneal mass, CT-guided needle biopsy:  -Mature adipose tissue, consistent with lipomatous tumor.  Limited sample.  See COMMENT.   Electronically signed by Heather Sidhu MD on 3/14/2022 at  2:36 PM   Comment    The morphologic findings are concerning for liposarcoma.  The biopsy is limited in nature, comprising of needle biopsy cores of lipomatous tissue.  The biopsy tissue may not be representative of the radiographically identified large (reported 8.7 cm) mass.  Fluorescence in-situ hybridization (FISH) for MDM2 probe is pending at this time (please see separate forthcoming [facility] cytogenetics laboratory report for results).       INTERPRETATION:  None (0%) of the interphase cells examined had a signal pattern indicative of amplification of MDM2.      Final Diagnosis 7/21/22:   Peripheral blood for morphology:  -Mild normochromic, normocytic anemia without evidence of red cell regeneration  -Leukocytosis with mature neutrophilia; leukocytes without morphologic abnormalities     Final Diagnosis 8/25/22:   Peripheral blood for morphology:  -Mild normochromic, normocytic anemia without evidence of red cell  regeneration  -Leukocytosis with mature neutrophilia; leukocytes without morphologic abnormalities       Case Report   Flow Cytometry Report                             Case: [id number]                                   Authorizing Provider:  Danna Amato DO         Collected:           08/25/2022 08:36 AM           Ordering Location:     [facility]   Received:            08/25/2022 09:33 AM                                  [facility]                                                             Pathologist:           Denise Lee MD                                                     Specimen:    Peripheral Blood                                                                           Flow Interpretation   A. Peripheral Blood:  -Polytypic B cells  -No aberrant immunophenotype on T cells  -Rare to absent CD34-positive blasts  -See comment   Electronically signed by Denise Lee MD on 8/26/2022 at 12:10 PM   Comment    There is no immunophenotypic evidence of non-Hodgkin lymphoma, lymphoid leukemia, or high grade myeloid neoplasm.         IMAGING:  CT CHEST WITHOUT CONTRAST March 23, 2022  FINDINGS:   Chest/mediastinum: No cardiomegaly. Small pericardial effusion. No  significant mediastinal, hilar or axillary lymphadenopathy. Asymmetric  elevation of the left hemidiaphragm as compared to the right.     Lung/pleura: Trace left pleural effusion. No significant right pleural  effusion. No significant pneumothorax. Significant interval  improvement in the previously seen right lower lobe consolidative  pulmonary opacities as compared to 2/19/2022 outside CT. Only small  patchy foci of opacity in the medial aspect of the right lower lobe is  seen.      Upper abdomen: Limited evaluation of the upper abdomen due to lack of  coverage and contrast. Cholelithiasis without CT evidence of acute  cholecystitis. There is approximately 8.7 x 7.9 x 9.3 cm (axial and CC  dimensions,  respectively), fatty area with minimal fat stranding in  the left upper quadrant retroperitoneal space just above the superior  pole of the kidney and abutting the adjacent adrenal gland, kidney,  pancreas and spleen. The spleen is enlarged measuring 15.5 cm in  craniocaudal dimension.     Bones and soft tissue: Multilevel degenerative changes of the spine.  Diffuse heterogenous appearance of the spine of indeterminate  etiology.                                                                      IMPRESSION:    1. Significant interval improvement in the previously seen right lower  lobe consolidative pulmonary opacity as compared to 2/19/2022 outside  CT. Only small patchy opacity in the medial aspect of the right lower  lobe remaining.  2. Small pericardial effusion.  3. Trace left pleural effusion.  4. There is approximately 8.7 x 7.9 x 9.3 cm fat attenuating area with  minimal fat stranding in the left upper quadrant, consistent with the  biopsy-proven lipomatous tumor.  5. Cholelithiasis without CT evidence of acute cholecystitis.  6. Splenomegaly.  7. Multilevel degenerative changes of the spine with diffuse  heterogenous appearance of the thoracic spine of indeterminate  Etiology.    CT AP 9/20/22:  IMPRESSION:   1.  There are two fat-containing left retroperitoneal masses that  appears stable in size. One of these at the left adrenal fossa  contacting the left adrenal and left kidney measures up to 10.5 cm  with ill-defined internal increased density. Another at the left  paraspinous retroperitoneum associated with atrophy of the left psoas  is also stable. The second lesion has hazy internal increased density  as well. This could just be stable complex lipomas. More aggressive  forms of retroperitoneal fat containing mass such as liposarcoma  cannot be entirely excluded. Therefore, recommend a surveillance CT to  establish long-term stability. If there are older studies that  includes this lesion for  imaging, these would be useful for review.  Recommend surveillance CT in six to 12 months.  2.  There are two hypodense but indeterminate nodules within the  liver. One of these appears stable, but another is new at the right  hepatic dome compared to 2/19/2022. Therefore, recommend liver MRI for  further imaging workup.  3.  Cholelithiasis.      ASSESSMENT/PLAN:  Kimo Greenwood is a 41 year old male who is referred for elevated WBC, anemia, splenomegaly. 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    1) Chronic anemia and intermittent neutrophilia  -Patient has evidence of chronic, intermittent neutrophilia and NC/NC anemia.  -Asymptomatic.  -Repeat CBC in July 2022 with persistent neutrophilia and NC/NC anemia. Negative for dysplasia or evidence of blasts.   -Iron studies, B12, folate, TSH WNL in July 2022.  -FLOW cytometry normal 7/2022.  -CT CAP is showing normal spleen size.   -Current CBC showing resolution of neutrophilia. Stable hemoglobin at 12.9.   -MPN NGS on peripheral blood pending.  -Hold on bone marrow biopsy at this time.    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  -9/2022 scans show stability.  -He is planned for repeat imaging in 6 months.  -Followed by Dr. Ojeda.    3) Liver lesions  -MRI liver ordered.     4) History of MDD    5) History of HTN, LOREN    6) Follow up in 2 months with results of MRI liver and CBC.       Danna Amato DO  Hematology/Oncology  [facility]        Again, thank you for allowing me to participate in the care of your patient.        Sincerely,        Danna Amato DO

## 2022-09-30 LAB
PATH REPORT.COMMENTS IMP SPEC: NORMAL
PATH REPORT.FINAL DX SPEC: NORMAL
PATH REPORT.MICROSCOPIC SPEC OTHER STN: NORMAL
PATH REPORT.MICROSCOPIC SPEC OTHER STN: NORMAL

## 2022-10-03 ENCOUNTER — PATIENT OUTREACH (OUTPATIENT)
Dept: ONCOLOGY | Facility: CLINIC | Age: 42
End: 2022-10-03

## 2022-10-03 NOTE — PROGRESS NOTES
Writer received call from Willard's mother stating that the ordered MRI from Dr. Amato can not be completed due to Willard having a prosthetic left hip. Writer will follow up with the radiology and Dr. Amato with next steps.    Chloe Davenport RN on 10/3/2022 at 3:34 PM

## 2022-10-03 NOTE — PROGRESS NOTES
Pre-Visit Planning   Next 5 appointments (look out 90 days)    Oct 06, 2022 11:30 AM  (Arrive by 11:10 AM)  Provider Visit with Ciro Love MD  [facility] ([facility] ) [address] [facility] [address] [phone]        Appointment Notes for this encounter:   Med Check    Questionnaires Reviewed/Assigned  No additional questionnaires are needed    Patient contact not needed. Meds Updated per med list received from .    Judy ALEXANDER RN

## 2022-10-04 NOTE — PROGRESS NOTES
Writer spoke to Dominick from Radiology who reports that Willard is safe for a MRI even though he has a prosthetic left hip. Writer spoke to Dalia, Willard's mother, to give her an update. She verbalized understanding and has no further concerns at this time.    Chloe Davenport RN on 10/4/2022 at 11:51 AM

## 2022-10-05 NOTE — PATIENT INSTRUCTIONS
Willard is scheduled for the following:     - Liver MRI on 10/17/22 at 8:15 am  - Labs on 12/06/22 at 10:45 am  - Dr. Amato visit on 12/06/22 at 11:30 am    Chloe Davenport RN on 10/4/2022 at 8:15 PM

## 2022-10-06 ENCOUNTER — VIRTUAL VISIT (OUTPATIENT)
Dept: FAMILY MEDICINE | Facility: CLINIC | Age: 42
End: 2022-10-06
Payer: COMMERCIAL

## 2022-10-06 DIAGNOSIS — I31.39 PERICARDIAL EFFUSION: ICD-10-CM

## 2022-10-06 PROCEDURE — 99214 OFFICE O/P EST MOD 30 MIN: CPT | Mod: 95 | Performed by: FAMILY MEDICINE

## 2022-10-06 ASSESSMENT — ASTHMA QUESTIONNAIRES
QUESTION_2 LAST FOUR WEEKS HOW OFTEN HAVE YOU HAD SHORTNESS OF BREATH: NOT AT ALL
QUESTION_5 LAST FOUR WEEKS HOW WOULD YOU RATE YOUR ASTHMA CONTROL: COMPLETELY CONTROLLED
QUESTION_3 LAST FOUR WEEKS HOW OFTEN DID YOUR ASTHMA SYMPTOMS (WHEEZING, COUGHING, SHORTNESS OF BREATH, CHEST TIGHTNESS OR PAIN) WAKE YOU UP AT NIGHT OR EARLIER THAN USUAL IN THE MORNING: NOT AT ALL
ACT_TOTALSCORE: 25
QUESTION_4 LAST FOUR WEEKS HOW OFTEN HAVE YOU USED YOUR RESCUE INHALER OR NEBULIZER MEDICATION (SUCH AS ALBUTEROL): NOT AT ALL
ACT_TOTALSCORE: 25
QUESTION_1 LAST FOUR WEEKS HOW MUCH OF THE TIME DID YOUR ASTHMA KEEP YOU FROM GETTING AS MUCH DONE AT WORK, SCHOOL OR AT HOME: NONE OF THE TIME

## 2022-10-06 ASSESSMENT — ENCOUNTER SYMPTOMS: ABDOMINAL PAIN: 0

## 2022-10-06 NOTE — PROGRESS NOTES
Willard is a 41 year old who is being evaluated via a billable video visit.      How would you like to obtain your AVS? MyChart  If the video visit is dropped, the invitation should be resent by: Send to e-mail at: [email or web address]  Will anyone else be joining your video visit? Yes: Mom=Belen. How would they like to receive their invitation? Send to e-mail at: [email or web address]          Assessment & Plan     BMI 50.0-59.9, adult (H)  Patient having limited success on Saxenda, had discussion today about potentially pursuing bariatric surgery, we did discuss that certain lifestyle and nutritional interventions need to be in place prior to considering surgery however a consult will definitely be appropriate.  Referral placed, appreciate assistance.  - Comprehensive Weight Management    Pericardial effusion  Asymptomatic, recommend repeat echocardiogram in 6 months for surveillance.  Did discuss that if he is having any chest pain, shortness of breath, dizziness he needs to see immediate care.  - Echocardiogram Complete      Return in about 6 months (around 4/6/2023) for echocardiogram.    Ciro Love MD  [facility]   Willard is a 41 year old accompanied by his mother, presenting for the following health issues:  Results (Discuss/review results of heart test)      HPI     Review/discuss Echo results    Patient is a 41-year-old male who presents for follow-up of a pericardial effusion, mom accompanies him.     He is not having any chest pain, shortness of breath or dizziness today.       Additionally for interval weight management.  Child would like to be under 300 pounds, he is using Saxenda every day tolerating well without reported side effects.  They are wondering if bariatric surgery would be appropriate for him.      Review of Systems   Cardiovascular: Negative for chest pain.   Gastrointestinal: Negative for abdominal pain.            Objective    Vitals - Patient  "Reported  Weight (Patient Reported): 176 kg (388 lb)  Height (Patient Reported): 179.1 cm (5' 10.5\")  BMI (Based on Pt Reported Ht/Wt): 54.88        Physical Exam   GENERAL: Healthy, alert and no distress  EYES: Eyes grossly normal to inspection.  No discharge or erythema, or obvious scleral/conjunctival abnormalities.  RESP: No audible wheeze, cough, or visible cyanosis.  No visible retractions or increased work of breathing.    SKIN: Visible skin clear. No significant rash, abnormal pigmentation or lesions.  NEURO: Cranial nerves grossly intact.  Mentation and speech appropriate for age.  PSYCH: Mentation appears normal, affect normal/bright, judgement and insight intact, normal speech and appearance well-groomed.            Video-Visit Details    Video Start Time: 11:15 PM    Type of service:  Video Visit    Video End Time:11:40 PM    Originating Location (pt. Location): Home    Distant Location (provider location):  [facility]     Platform used for Video Visit: Rodger"

## 2022-10-17 ENCOUNTER — PATIENT OUTREACH (OUTPATIENT)
Dept: ONCOLOGY | Facility: CLINIC | Age: 42
End: 2022-10-17

## 2022-10-17 NOTE — PROGRESS NOTES
Writer received call from Dominick in radiology stating that they were unable to complete the ordered Liver MRI for Willard. Dominick states that Willard has a [employer] bladder stimulator that is implanted and has wires. Dr. Amato can contact the body radiologist at [phone] to discuss imaging recommendations.    Chloe Davenport RN on 10/17/2022 at 1:35 PM

## 2022-10-19 DIAGNOSIS — K76.9 LIVER LESION: Primary | ICD-10-CM

## 2022-10-20 NOTE — PROGRESS NOTES
Per Dr. Amato:     Ct liver is in. Please have this scheduled sometime this month.     Chloe Davenport RN on 10/20/2022 at 4:25 PM

## 2022-10-22 ENCOUNTER — HEALTH MAINTENANCE LETTER (OUTPATIENT)
Age: 42
End: 2022-10-22

## 2022-11-04 ENCOUNTER — HOSPITAL ENCOUNTER (OUTPATIENT)
Dept: GENERAL RADIOLOGY | Facility: CLINIC | Age: 42
Discharge: HOME | End: 2022-11-04
Attending: ORTHOPAEDIC SURGERY | Admitting: ORTHOPAEDIC SURGERY
Payer: COMMERCIAL

## 2022-11-04 DIAGNOSIS — M19.90 ARTHRITIS: ICD-10-CM

## 2022-11-04 DIAGNOSIS — M16.9 HIP OSTEOARTHRITIS: ICD-10-CM

## 2022-11-04 PROCEDURE — 250N000009 HC RX 250: Performed by: ORTHOPAEDIC SURGERY

## 2022-11-04 PROCEDURE — 250N000011 HC RX IP 250 OP 636: Performed by: ORTHOPAEDIC SURGERY

## 2022-11-04 PROCEDURE — 77002 NEEDLE LOCALIZATION BY XRAY: CPT

## 2022-11-04 PROCEDURE — 255N000002 HC RX 255 OP 636: Performed by: ORTHOPAEDIC SURGERY

## 2022-11-04 RX ORDER — BUPIVACAINE HYDROCHLORIDE 5 MG/ML
30 INJECTION, SOLUTION EPIDURAL; INTRACAUDAL ONCE
Status: COMPLETED | OUTPATIENT
Start: 2022-11-04 | End: 2022-11-04

## 2022-11-04 RX ORDER — IOPAMIDOL 408 MG/ML
10 INJECTION, SOLUTION INTRATHECAL ONCE
Status: COMPLETED | OUTPATIENT
Start: 2022-11-04 | End: 2022-11-04

## 2022-11-04 RX ORDER — TRIAMCINOLONE ACETONIDE 40 MG/ML
40 INJECTION, SUSPENSION INTRA-ARTICULAR; INTRAMUSCULAR ONCE
Status: COMPLETED | OUTPATIENT
Start: 2022-11-04 | End: 2022-11-04

## 2022-11-04 RX ORDER — LIDOCAINE HYDROCHLORIDE 10 MG/ML
5 INJECTION, SOLUTION EPIDURAL; INFILTRATION; INTRACAUDAL; PERINEURAL ONCE
Status: COMPLETED | OUTPATIENT
Start: 2022-11-04 | End: 2022-11-04

## 2022-11-04 RX ADMIN — IOPAMIDOL 1 ML: 408 INJECTION, SOLUTION INTRATHECAL at 09:25

## 2022-11-04 RX ADMIN — BUPIVACAINE HYDROCHLORIDE 4 ML: 5 INJECTION, SOLUTION EPIDURAL; INTRACAUDAL; PERINEURAL at 09:24

## 2022-11-04 RX ADMIN — TRIAMCINOLONE ACETONIDE 40 MG: 40 INJECTION, SUSPENSION INTRA-ARTICULAR; INTRAMUSCULAR at 09:26

## 2022-11-04 RX ADMIN — LIDOCAINE HYDROCHLORIDE 2 ML: 10 INJECTION, SOLUTION EPIDURAL; INFILTRATION; INTRACAUDAL; PERINEURAL at 09:00

## 2022-11-04 NOTE — DISCHARGE INSTRUCTIONS
Orthopedic Discharge Instructions:   After Your Injection or Aspiration  ________________________________________    Patient Name:  Kimo Greenwood  Today's Date:  November 4, 2022  The provider who performed your right hip injection was Alberto Rodriguez at [facility]..  Care of needle site  If you have new numbness down your leg, this may last up to 6 hours, but it should go away. You may need help with walking until your leg feels normal.   Over the next 24 to 48 hours, pain at the needle site may increase before it gets better.   For the next 48 hours, use ice packs for 15 minutes, three to four times a day for pain.  If you have a bandage, you may remove it the next morning.   No tub baths, hot tubs or swimming for 48 hours. You may shower the next day.   Activity  Do not drive until morning.   Limit your activity based on your pain level. Follow your doctor s orders for activity.   You may eat a normal diet.   If you had sedation,   - You may feel sleepy, forgetful or unsteady.   - Do not drink alcohol for 24 hours.  Medicines  If you take aspirin or platelet inhibitors, you can restart them tomorrow.   Restart all other medicines today at your regular dose, including Coumadin (warfarin).  If you are restarting Coumadin, talk to your doctor about having your INR checked.   If you had a steroid shot   The medicine should help reduce swelling and pain. This may take from 7 to 10 days.   Side effects from the shot will be mild and go away in 2 to 3 days. Common side effects may include:  -  Insomnia (trouble sleeping).  -  Heartburn.  -  Flushed face.  -  Water retention (bloating or fluid build-up).  -  Increased appetite (feeling more hungry than usual).  -  Increased blood sugar.  If you have diabetes, watch your blood sugar closely. If needed, call your doctor to help you control your blood sugar.  Some patients will get lasting relief from a single shot. Others may require up to three shots to  get results. If you have more than one steroid shot, they should be given two weeks apart.  Some patients do not have relief of symptoms.    Follow-up:       Call your doctor or go to the Emergency Room if you have severe pain, fever or problems with bowel or bladder control.

## 2022-11-04 NOTE — PROCEDURES
[facility]    Procedure: Right hip steroid injection    Date/Time: 11/4/2022 9:06 AM  Performed by: Alberto Rodriguez PA-C  Authorized by: Alberto Rodriguez PA-C       UNIVERSAL PROTOCOL   Site Marked: Yes  Prior Images Obtained and Reviewed:  Yes  Required items: Required blood products, implants, devices and special equipment available    Patient identity confirmed:  Verbally with patient  Patient was reevaluated immediately before administering moderate or deep sedation or anesthesia  Confirmation Checklist:  Patient's identity using two indicators, relevant allergies, procedure was appropriate and matched the consent or emergent situation and correct equipment/implants were available  Time out: Immediately prior to the procedure a time out was called    Universal Protocol: the Joint Commission Universal Protocol was followed    Preparation: Patient was prepped and draped in usual sterile fashion       ANESTHESIA    Local Anesthetic: Lidocaine 1% without epinephrine and bupivacaine 0.5% without epinephrine    See dictated procedure note for full details.    PROCEDURE    Patient Tolerance:  Patient tolerated the procedure well with no immediate complications  Length of time physician/provider present for 1:1 monitoring during sedation: 0

## 2022-11-07 ENCOUNTER — APPOINTMENT (OUTPATIENT)
Dept: URBAN - METROPOLITAN AREA CLINIC 260 | Age: 42
Setting detail: DERMATOLOGY
End: 2022-11-08

## 2022-11-07 VITALS — HEIGHT: 70 IN | WEIGHT: 315 LBS

## 2022-11-07 DIAGNOSIS — L20.89 OTHER ATOPIC DERMATITIS: ICD-10-CM

## 2022-11-07 PROBLEM — L20.84 INTRINSIC (ALLERGIC) ECZEMA: Status: ACTIVE | Noted: 2022-11-07

## 2022-11-07 PROCEDURE — OTHER COUNSELING: OTHER

## 2022-11-07 PROCEDURE — 99203 OFFICE O/P NEW LOW 30 MIN: CPT

## 2022-11-07 PROCEDURE — OTHER MIPS QUALITY: OTHER

## 2022-11-07 PROCEDURE — OTHER PRESCRIPTION: OTHER

## 2022-11-07 PROCEDURE — OTHER PRESCRIPTION MEDICATION MANAGEMENT: OTHER

## 2022-11-07 RX ORDER — BETAMETHASONE DIPROPIONATE 0.5 MG/G
0.05% OINTMENT TOPICAL BID
Qty: 45 | Refills: 2 | Status: ERX | COMMUNITY
Start: 2022-11-07

## 2022-11-07 RX ORDER — HYDROCORTISONE 25 MG/G
2.5% OINTMENT TOPICAL BID
Qty: 28.35 | Refills: 2 | Status: ERX | COMMUNITY
Start: 2022-11-07

## 2022-11-07 ASSESSMENT — LOCATION ZONE DERM
LOCATION ZONE: ARM
LOCATION ZONE: FACE

## 2022-11-07 ASSESSMENT — LOCATION DETAILED DESCRIPTION DERM
LOCATION DETAILED: LEFT VENTRAL DISTAL FOREARM
LOCATION DETAILED: RIGHT MEDIAL MALAR CHEEK
LOCATION DETAILED: RIGHT VENTRAL DISTAL FOREARM
LOCATION DETAILED: LEFT MEDIAL MALAR CHEEK

## 2022-11-07 ASSESSMENT — BSA RASH: BSA RASH: 1

## 2022-11-07 ASSESSMENT — LOCATION SIMPLE DESCRIPTION DERM
LOCATION SIMPLE: RIGHT FOREARM
LOCATION SIMPLE: RIGHT CHEEK
LOCATION SIMPLE: LEFT FOREARM
LOCATION SIMPLE: LEFT CHEEK

## 2022-11-07 ASSESSMENT — SEVERITY ASSESSMENT 2020: SEVERITY 2020: ALMOST CLEAR

## 2022-11-07 NOTE — PROCEDURE: MIPS QUALITY
Detail Level: Detailed
Quality 110: Preventive Care And Screening: Influenza Immunization: Influenza Immunization previously received during influenza season
Quality 130: Documentation Of Current Medications In The Medical Record: Current Medications Documented
Quality 226: Preventive Care And Screening: Tobacco Use: Screening And Cessation Intervention: Patient screened for tobacco use, is a smoker AND received Cessation Counseling within the Previous 12 Months
Quality 431: Preventive Care And Screening: Unhealthy Alcohol Use - Screening: Patient not identified as an unhealthy alcohol user when screened for unhealthy alcohol use using a systematic screening method

## 2022-11-07 NOTE — PROCEDURE: PRESCRIPTION MEDICATION MANAGEMENT
Initiate Treatment: Hydrocortisone 2.5% ointment BID to face X 2 weeks.
Continue Regimen: Betamethasone 0.05% ointment BID to arms as needed for flares
Plan: Follow up as needed if not improved
Detail Level: Simple
Render In Strict Bullet Format?: No

## 2022-11-08 ENCOUNTER — MYC MEDICAL ADVICE (OUTPATIENT)
Dept: FAMILY MEDICINE | Facility: CLINIC | Age: 42
End: 2022-11-08

## 2022-11-08 DIAGNOSIS — F33.9 RECURRENT MAJOR DEPRESSION (H): Primary | ICD-10-CM

## 2022-11-17 ENCOUNTER — HOSPITAL ENCOUNTER (OUTPATIENT)
Dept: CT IMAGING | Facility: CLINIC | Age: 42
Discharge: HOME | End: 2022-11-17
Attending: INTERNAL MEDICINE | Admitting: INTERNAL MEDICINE
Payer: COMMERCIAL

## 2022-11-17 DIAGNOSIS — K76.9 LIVER LESION: ICD-10-CM

## 2022-11-17 PROCEDURE — 258N000003 HC RX IP 258 OP 636: Performed by: INTERNAL MEDICINE

## 2022-11-17 PROCEDURE — 250N000011 HC RX IP 250 OP 636: Performed by: INTERNAL MEDICINE

## 2022-11-17 PROCEDURE — 74178 CT ABD&PLV WO CNTR FLWD CNTR: CPT

## 2022-11-17 RX ORDER — IOPAMIDOL 755 MG/ML
500 INJECTION, SOLUTION INTRAVASCULAR ONCE
Status: COMPLETED | OUTPATIENT
Start: 2022-11-17 | End: 2022-11-17

## 2022-11-17 RX ADMIN — SODIUM CHLORIDE 63 ML: 9 INJECTION, SOLUTION INTRAVENOUS at 11:16

## 2022-11-17 RX ADMIN — IOPAMIDOL 100 ML: 755 INJECTION, SOLUTION INTRAVENOUS at 11:16

## 2022-11-23 DIAGNOSIS — K59.01 SLOW TRANSIT CONSTIPATION: ICD-10-CM

## 2022-11-23 NOTE — TELEPHONE ENCOUNTER
Routing refill request to provider for review/approval because:  Please review as this has not been filled with new PCP     Eliana Concepcion RN

## 2022-11-23 NOTE — TELEPHONE ENCOUNTER
Lili patients caregiver calling requesting refill of Fiber-Lax 625 mg states that they checked with the pharmacy and the prescription on file has . Please call [phone] if there are any questions.

## 2022-11-25 RX ORDER — CALCIUM POLYCARBOPHIL 625 MG/1
2 TABLET, FILM COATED ORAL 2 TIMES DAILY
Qty: 360 TABLET | Refills: 0 | Status: SHIPPED | OUTPATIENT
Start: 2022-11-25 | End: 2023-03-09

## 2022-12-06 DIAGNOSIS — D72.829 LEUKOCYTOSIS, UNSPECIFIED TYPE: Primary | ICD-10-CM

## 2022-12-07 ENCOUNTER — PATIENT OUTREACH (OUTPATIENT)
Dept: ONCOLOGY | Facility: CLINIC | Age: 42
End: 2022-12-07

## 2022-12-07 NOTE — LETTER
"    December 7, 2022       TO: Buffalolynda Greenwood  [address] [facility] [address]  [place] [address]         Dear Mr. Greenwood,    We missed you at your appointment at [facility]. We have several options to help you reschedule your appointment:      Call [phone]    Visit our website at [email or web address] and click \"I Want To\" (in upper right) and select Request an Appointment    Request an appointment via [employer] [facility] [email or web address]     We are committed to providing you with exceptional care and service. It's important that our patients can get appointments when they need them, so we ask that you make every effort to consistently attend scheduled appointments and give us notice when you need to cancel an appointment.    If financial concerns have kept you from your appointment, we want to help. Please call the financial counselor at [phone] for assistance.      Sincerely,      [facility]  "

## 2022-12-07 NOTE — PROGRESS NOTES
Willard was scheduled for labs and a follow up with Dr. Aamto on 12/06/22. He did not show up to this appointment. Writer will send a letter to his home with instructions on rescheduling.    Chloe Davenport RN on 12/7/2022 at 9:03 AM

## 2022-12-23 ENCOUNTER — TRANSFERRED RECORDS (OUTPATIENT)
Dept: HEALTH INFORMATION MANAGEMENT | Facility: CLINIC | Age: 42
End: 2022-12-23

## 2022-12-23 DIAGNOSIS — E66.01 MORBID OBESITY (H): ICD-10-CM

## 2022-12-23 RX ORDER — LIRAGLUTIDE 6 MG/ML
INJECTION, SOLUTION SUBCUTANEOUS
Qty: 15 ML | Refills: 1 | Status: SHIPPED | OUTPATIENT
Start: 2022-12-23 | End: 2023-01-18 | Stop reason: ALTCHOICE

## 2022-12-23 NOTE — TELEPHONE ENCOUNTER
Routing refill request to provider for review/approval because:  Labs not current:  A1c  Prescribed for weight loss    Lab Results   Component Value Date    A1C 5.1 05/03/2022    A1C 5.1 08/27/2021    A1C 5.1 12/24/2019    A1C 4.6 03/20/2017    A1C 4.9 01/25/2016    A1C 5.1 08/01/2015    A1C 5.2 05/20/2014     Matthew LIVE RN

## 2022-12-27 ENCOUNTER — MYC MEDICAL ADVICE (OUTPATIENT)
Dept: FAMILY MEDICINE | Facility: CLINIC | Age: 42
End: 2022-12-27

## 2022-12-27 NOTE — TELEPHONE ENCOUNTER
liraglutide - Weight Management (SAXENDA) 18 MG/3ML pen 15 mL 1 12/23/2022  No   Sig: Inject 3 mg subcutaneously daily.   Sent to pharmacy as: Saxenda 18 MG/3ML Subcutaneous Solution Pen-injector (liraglutide - Weight Management)   Class: E-Prescribe   Order: [id number]   E-Prescribing Status: Receipt confirmed by pharmacy (12/23/2022  3:00 PM CST)     Judy ALEXANDER RN

## 2022-12-30 ENCOUNTER — ONCOLOGY VISIT (OUTPATIENT)
Dept: ONCOLOGY | Facility: CLINIC | Age: 42
End: 2022-12-30
Attending: INTERNAL MEDICINE
Payer: COMMERCIAL

## 2022-12-30 VITALS
BODY MASS INDEX: 44.1 KG/M2 | OXYGEN SATURATION: 98 % | TEMPERATURE: 97 F | HEIGHT: 71 IN | SYSTOLIC BLOOD PRESSURE: 132 MMHG | RESPIRATION RATE: 22 BRPM | WEIGHT: 315 LBS | HEART RATE: 100 BPM | DIASTOLIC BLOOD PRESSURE: 74 MMHG

## 2022-12-30 DIAGNOSIS — D64.9 ANEMIA, UNSPECIFIED TYPE: ICD-10-CM

## 2022-12-30 DIAGNOSIS — D72.828 NEUTROPHILIA: ICD-10-CM

## 2022-12-30 DIAGNOSIS — D72.829 LEUKOCYTOSIS, UNSPECIFIED TYPE: ICD-10-CM

## 2022-12-30 DIAGNOSIS — K76.9 LIVER LESION: ICD-10-CM

## 2022-12-30 LAB
BASOPHILS # BLD AUTO: 0.1 10E3/UL (ref 0–0.2)
BASOPHILS NFR BLD AUTO: 0 %
EOSINOPHIL # BLD AUTO: 0.1 10E3/UL (ref 0–0.7)
EOSINOPHIL NFR BLD AUTO: 1 %
ERYTHROCYTE [DISTWIDTH] IN BLOOD BY AUTOMATED COUNT: 13.2 % (ref 10–15)
HCT VFR BLD AUTO: 36.6 % (ref 40–53)
HGB BLD-MCNC: 12.5 G/DL (ref 13.3–17.7)
IMM GRANULOCYTES # BLD: 0.1 10E3/UL
IMM GRANULOCYTES NFR BLD: 0 %
LYMPHOCYTES # BLD AUTO: 2.1 10E3/UL (ref 0.8–5.3)
LYMPHOCYTES NFR BLD AUTO: 18 %
MCH RBC QN AUTO: 29.1 PG (ref 26.5–33)
MCHC RBC AUTO-ENTMCNC: 34.2 G/DL (ref 31.5–36.5)
MCV RBC AUTO: 85 FL (ref 78–100)
MONOCYTES # BLD AUTO: 0.4 10E3/UL (ref 0–1.3)
MONOCYTES NFR BLD AUTO: 4 %
NEUTROPHILS # BLD AUTO: 8.8 10E3/UL (ref 1.6–8.3)
NEUTROPHILS NFR BLD AUTO: 77 %
NRBC # BLD AUTO: 0 10E3/UL
NRBC BLD AUTO-RTO: 0 /100
PLATELET # BLD AUTO: 247 10E3/UL (ref 150–450)
RBC # BLD AUTO: 4.29 10E6/UL (ref 4.4–5.9)
WBC # BLD AUTO: 11.5 10E3/UL (ref 4–11)

## 2022-12-30 PROCEDURE — 85025 COMPLETE CBC W/AUTO DIFF WBC: CPT | Performed by: INTERNAL MEDICINE

## 2022-12-30 PROCEDURE — G0463 HOSPITAL OUTPT CLINIC VISIT: HCPCS | Performed by: INTERNAL MEDICINE

## 2022-12-30 PROCEDURE — 36415 COLL VENOUS BLD VENIPUNCTURE: CPT

## 2022-12-30 PROCEDURE — 99214 OFFICE O/P EST MOD 30 MIN: CPT | Performed by: INTERNAL MEDICINE

## 2022-12-30 ASSESSMENT — PAIN SCALES - GENERAL: PAINLEVEL: EXTREME PAIN (9)

## 2022-12-30 NOTE — PROGRESS NOTES
[facility]    Hematology/Oncology Established Patient Note      Today's Date: 12/30/22    Reason for Consultation: Elevated WBC  Referring Provider: Kory Hudson MD      HISTORY OF PRESENT ILLNESS: Kimo Greenwood is a 42 year old male who is referred for elevated WBC, anemia, splenomegaly. 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    Patient has evidence of chronic, intermittent leukocytosis since 2012. Specifically, he has had neutrophilia. On 4/22/22, WBC was 11.1, ANC 9.7. On 4/27/22, WBC was 8.0, ANC 4.8. Patient also has evidence of chronic, intermittent NCNC anemia. Record review shows gillian of 9.1 in 2017. Patient and mother unable to recall the events of that timeframe.     Of note, patient was hospitalized for pneumonia earlier this year. Per record review, an incidental mass was noted an 8.7 cm lipomatous mass in the retroperitoneum near the kidney and spleen. He was evaluated by surgical oncology, Dr. Ojeda, with biopsy on 3/8/22 returning as lipomatous tumor/liposarcoma, negative for MDM2. He is due for follow up imaging in September 2022 with follow in surg onc clinic.    A repeat CT chest was done on 3/23/22, which returned with significant improvement RLL pneumonia. It continued to show the 8.7 x 7.9 x 9.3 cm RP mass in the LUQ. The spleen was enlarged to 15.5 cm. No other adenopathy.     Patient is present with mother. They report only new medication is liraglutide for weight management. No weight loss, fever, drenching night sweats, or LAD.     Of note, patient lives at [facility]. He is able to perform ADLs. He does smoke cigarettes.       INTERIM HISTORY:  Most recent scans showed stable RP mass, resolution of the hepatic lesion at segment 8 and stable segment 6/7 lesion. No weight loss, fever, LAD. He states one time  episode of BRBPR (last colonoscopy 2020, this was performed due to colitis in mother, told 5 year follow up). He has chronic left hip pain s/p MARGO.       REVIEW OF SYSTEMS:   A 14 point ROS was reviewed with pertinent positives and negatives in the HPI.        HOME MEDICATIONS:  Current Outpatient Medications   Medication Sig Dispense Refill     albuterol (VENTOLIN HFA) 108 (90 Base) MCG/ACT inhaler INHALE 2 PUFFS INTO LUNGS EVERY SIX HOURS AS NEEDED FOR SHORTNESS OF BREATH / DYSPNEA OR WHEEZING 18 g 1     calcium polycarbophil (FIBER-LAX) 625 MG tablet Take 2 tablets (1,250 mg) by mouth 2 times daily 360 tablet 0     DULoxetine (CYMBALTA) 60 MG capsule Take 120 mg by mouth daily        hydrochlorothiazide (HYDRODIURIL) 25 MG tablet TAKE 1 TABLET BY MOUTH ONCE DAILY 30 tablet 5     insulin pen needle (31G X 6 MM) 31G X 6 MM miscellaneous Use 1 pen needles daily or as directed. 100 each 0     Lidocaine (LIDOCARE) 4 % Patch Place 1-2 patches onto the skin every 24 hours To prevent lidocaine toxicity, patient should be patch free for 12 hrs daily. (Patient not taking: No sig reported) 60 patch 1     liraglutide - Weight Management (SAXENDA) 18 MG/3ML pen Inject 3 mg subcutaneously daily. 15 mL 1     lisinopril (ZESTRIL) 10 MG tablet TAKE 1 TABLET BY MOUTH EVERY MORNING 30 tablet 5     mirabegron (MYRBETRIQ) 50 MG 24 hr tablet Take 1 tablet by mouth daily       naproxen sodium 220 MG capsule If Tylenol is not helpful, this can be added 2 tabs twice a day with meals. (Patient taking differently: Take 440 mg by mouth 2 times daily (with meals)) 60 capsule 3     OLANZapine-samidorphan (LYBALVI) 10-10 MG TABS tablet Take 1 tablet by mouth At Bedtime       order for DME Equipment being ordered: CPAP supplies 1 each 0     oxybutynin ER (DITROPAN XL) 15 MG 24 hr tablet Take 30 mg by mouth daily       pantoprazole (PROTONIX) 40 MG EC tablet TAKE 1 TABLET BY MOUTH ONCE DAILY 30-60 MINUTES BR FIRST MEAL OF THE DAY 30 tablet 5      prazosin (MINIPRESS) 1 MG capsule Take 3 mg by mouth At Bedtime       VITAMIN D3 50 MCG (2000 UT) tablet TAKE ONE TABLET BY MOUTH EVERY DAY 90 tablet 3         ALLERGIES:  Allergies   Allergen Reactions     Other [No Clinical Screening - See Comments] Other (See Comments)     Awoke from anesthesia with anger and ripping off dressing, after interstim placement, outside hospital 2013         PAST MEDICAL HISTORY:  Past Medical History:   Diagnosis Date     Arthritis     DDD hips and knees     Asthma      Asthma      Patel's esophagus      Chronic pain      Chronic pain - left hip/leg pain     degenerative disc disease, back, hips, knees     Gastroesophageal reflux disease      History of anesthesia complications     agitation x1 after interstim 2013     Hypertension      Hypertension      Leukocytosis      LPRD (laryngopharyngeal reflux disease)      Marijuana abuse      Marijuana abuse      MDD (major depressive disorder)      MDD (major depressive disorder)      Mental retardation, mild (I.Q. 50-70)      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    With associated speech/language delay     Obesity 11/11/2010     LOREN (obstructive sleep apnea)     Uses a CPAP     LOREN (obstructive sleep apnea)      Seborrheic dermatitis      Seborrheic dermatitis      Sleep apnea     CPAP         PAST SURGICAL HISTORY:  Past Surgical History:   Procedure Laterality Date     ARTHROPLASTY HIP Left 1/25/2017    Procedure: ARTHROPLASTY HIP;  Surgeon: Bala Randall MD;  Location: [facility] OR     ARTHROPLASTY HIP Left      ARTHROPLASTY REVISION HIP Left 6/24/2019    Procedure: Revision left total hip arthroplasty with a head and liner exchange to a Biomet dual mobility head and liner.;  Surgeon: Bala Randall MD;  Location: [facility] OR     BLADDER SURGERY      Alida Ibarra,Interstem stimulator implant     CLOSED REDUCTION HIP Left 6/10/2019    Procedure: Closed reduction under general anesthesia left recurrent  prosthetic hip dislocation;  Surgeon: Rafat Cazares MD;  Location: [facility] OR     COLONOSCOPY N/A 10/30/2015    Procedure: COMBINED COLONOSCOPY, SINGLE OR MULTIPLE BIOPSY/POLYPECTOMY BY BIOPSY;  Surgeon: Alexis Jackson MD;  Location: [facility] GI     COLONOSCOPY N/A 11/17/2016    Procedure: COMBINED COLONOSCOPY, SINGLE OR MULTIPLE BIOPSY/POLYPECTOMY BY BIOPSY;  Surgeon: Cat Huber MD;  [facility]     COLONOSCOPY N/A 10/28/2020    Procedure: COLONOSCOPY;  Surgeon: You Kraus MD;  Location: [facility] OR     COLONOSCOPY       ESOPHAGOSCOPY, GASTROSCOPY, DUODENOSCOPY (EGD), COMBINED N/A 11/17/2016    Procedure: COMBINED ESOPHAGOSCOPY, GASTROSCOPY, DUODENOSCOPY (EGD), BIOPSY SINGLE OR MULTIPLE;  Surgeon: Cat Huber MD;  [facility]     ESOPHAGOSCOPY, GASTROSCOPY, DUODENOSCOPY (EGD), COMBINED N/A 3/12/2020    Procedure: ESOPHAGOGASTRODUODENOSCOPY WITH BIOPSIES;  Surgeon: You Kraus MD;  Location: [facility]     ESOPHAGOSCOPY, GASTROSCOPY, DUODENOSCOPY (EGD), COMBINED N/A 10/28/2020    Procedure: ESOPHAGOGASTRODUODENOSCOPY, WITH BIOPSY;  Surgeon: You Kraus MD;  Location: [facility] OR     ESOPHAGOSCOPY, GASTROSCOPY, DUODENOSCOPY (EGD), COMBINED       EXAM UNDER ANESTHESIA, FULGURATE CONDYLOMA ANUS, COMBINED N/A 12/22/2016    Procedure: COMBINED EXAM UNDER ANESTHESIA, FULGURATE CONDYLOMA ANUS;  Surgeon: Nya Cabello MD;  [facility]     GENITOURINARY SURGERY       JOINT REPLACEMENT Left 2017    MARGO, Revision Left MARGO     OTHER SURGICAL HISTORY      interstim stimulator implant     NJ CYSTOURETHROSCOPY INJ CHEMODENERVATION BLADDER N/A 1/16/2019    Procedure: CYSTOSCOPY BOTOX BLADDER INJECTIONS (100 UNITS IN 10CC);  Surgeon: Didier Ibarra MD;  Location: [facility];  Service: Urology     N[place] IMPLANT PERIPH/GASTRIC NEUROSTIM/ N/A 1/8/2020    Procedure: NEW INTERSTIM LEAD, REPLACE OLD; NEW INTERSTIM BATTERY, REPLACE OLD;  Surgeon: Didier Ibarra MD;   "Location: [facility] Main OR;  Service: Urology         SOCIAL HISTORY:  Social History     Socioeconomic History     Marital status: Single     Spouse name: Not on file     Number of children: Not on file     Years of education: Not on file     Highest education level: Not on file   Occupational History     Not on file   Tobacco Use     Smoking status: Every Day     Packs/day: 1.00     Years: 1.00     Pack years: 1.00     Types: Vaping Device, Cigarettes     Smokeless tobacco: Current     Types: Chew     Tobacco comments:     Smokes E Cigs equal to 1 PPD   Vaping Use     Vaping Use: Every day     Substances: Nicotine, Flavoring     Devices: Refillable tank   Substance and Sexual Activity     Alcohol use: Yes     Comment: socially--\"not very often\" \"once a month\"     Drug use: No     Comment: patient denies any marijuana use     Sexual activity: Yes     Partners: Female     Birth control/protection: Condom   Other Topics Concern     Parent/sibling w/ CABG, MI or angioplasty before 65F 55M? No   Social History Narrative     Not on file     Social Determinants of Health     Financial Resource Strain: Not on file   Food Insecurity: Not on file   Transportation Needs: Not on file   Physical Activity: Not on file   Stress: Not on file   Social Connections: Not on file   Intimate Partner Violence: Not At Risk     Fear of Current or Ex-Partner: No     Emotionally Abused: No     Physically Abused: No     Sexually Abused: No   Housing Stability: Not on file         FAMILY HISTORY:  Family History   Problem Relation Age of Onset     Anxiety Disorder Mother      Depression Mother      Ulcerative Colitis Mother 18     Breast Cancer Maternal Grandmother      Cerebrovascular Disease Maternal Grandmother      Breast Cancer Maternal Aunt      Cancer Maternal Grandfather         grt uncles colon cancer         PHYSICAL EXAM:  Vital signs:  /74 (Cuff Size: Adult Large)   Pulse 100   Temp 97  F (36.1  C) (Tympanic)   Resp 22  " " Ht 1.791 m (5' 10.5\")   Wt (!) 180.5 kg (398 lb)   SpO2 98%   BMI 56.30 kg/m       GENERAL/CONSTITUTIONAL: No acute distress.  LYMPH: No anterior cervical, posterior cervical, supraclavicular, axillary or inguinal adenopathy.   RESPIRATORY: Clear to auscultation bilaterally. No crackles or wheezing.   CARDIOVASCULAR: Regular rate and rhythm without murmurs, gallops, or rubs.  GASTROINTESTINAL: No hepatosplenomegaly, masses, or tenderness. The patient has normal bowel sounds. No guarding.  No distention.  MUSCULOSKELETAL: Warm and well-perfused, no cyanosis, clubbing, or edema.   NEUROLOGIC: Cranial nerves II-XII are intact. Alert, oriented, answers questions appropriately.  INTEGUMENTARY: No rashes or jaundice.  GAIT: Antalgic gait.      LABS:   Latest Reference Range & Units 12/30/22 11:02   WBC 4.0 - 11.0 10e3/uL 11.5 (H)   Hemoglobin 13.3 - 17.7 g/dL 12.5 (L)   Hematocrit 40.0 - 53.0 % 36.6 (L)   Platelet Count 150 - 450 10e3/uL 247   RBC Count 4.40 - 5.90 10e6/uL 4.29 (L)   MCV 78 - 100 fL 85   MCH 26.5 - 33.0 pg 29.1   MCHC 31.5 - 36.5 g/dL 34.2   RDW 10.0 - 15.0 % 13.2   % Neutrophils % 77   % Lymphocytes % 18   % Monocytes % 4   % Eosinophils % 1   % Basophils % 0   Absolute Basophils 0.0 - 0.2 10e3/uL 0.1   Absolute Eosinophils 0.0 - 0.7 10e3/uL 0.1   Absolute Immature Granulocytes <=0.4 10e3/uL 0.1   Absolute Lymphocytes 0.8 - 5.3 10e3/uL 2.1   Absolute Monocytes 0.0 - 1.3 10e3/uL 0.4   % Immature Granulocytes % 0   Absolute Neutrophils 1.6 - 8.3 10e3/uL 8.8 (H)   Absolute NRBCs 10e3/uL 0.0   NRBCs per 100 WBC <1 /100 0       Interpretation    No mutations were identified in the analyzed gene regions of JAK2, CALR, or MPL genes.         PATHOLOGY:  Surgical Pathology Report                         Case: [id number]                                   Authorizing Provider:  Star Ojeda MD         Collected:           03/08/2022 11:21 AM           Ordering Location:     [facility]" Received:            03/08/2022 01:49 PM                                  Imaging                                                                       Pathologist:           Heather Sidhu MD                                                            Specimen:    Retroperitoneal, Retroperitoneal Biopsy                                                    Final Diagnosis   Retroperitoneal mass, CT-guided needle biopsy:  -Mature adipose tissue, consistent with lipomatous tumor.  Limited sample.  See COMMENT.   Electronically signed by Heather Sidhu MD on 3/14/2022 at  2:36 PM   Comment    The morphologic findings are concerning for liposarcoma.  The biopsy is limited in nature, comprising of needle biopsy cores of lipomatous tissue.  The biopsy tissue may not be representative of the radiographically identified large (reported 8.7 cm) mass.  Fluorescence in-situ hybridization (FISH) for MDM2 probe is pending at this time (please see separate forthcoming [facility] cytogenetics laboratory report for results).       INTERPRETATION:  None (0%) of the interphase cells examined had a signal pattern indicative of amplification of MDM2.      Final Diagnosis 7/21/22:   Peripheral blood for morphology:  -Mild normochromic, normocytic anemia without evidence of red cell regeneration  -Leukocytosis with mature neutrophilia; leukocytes without morphologic abnormalities     Final Diagnosis 8/25/22:   Peripheral blood for morphology:  -Mild normochromic, normocytic anemia without evidence of red cell regeneration  -Leukocytosis with mature neutrophilia; leukocytes without morphologic abnormalities       Case Report   Flow Cytometry Report                             Case: [id number]                                   Authorizing Provider:  Danna Amato DO         Collected:           08/25/2022 08:36 AM           Ordering Location:     [facility]   Received:            08/25/2022 09:33 AM                                   [facility]                                                             Pathologist:           Denise Lee MD                                                     Specimen:    Peripheral Blood                                                                           Flow Interpretation   A. Peripheral Blood:  -Polytypic B cells  -No aberrant immunophenotype on T cells  -Rare to absent CD34-positive blasts  -See comment   Electronically signed by Denise Lee MD on 8/26/2022 at 12:10 PM   Comment    There is no immunophenotypic evidence of non-Hodgkin lymphoma, lymphoid leukemia, or high grade myeloid neoplasm.         IMAGING:  CT CHEST WITHOUT CONTRAST March 23, 2022  FINDINGS:   Chest/mediastinum: No cardiomegaly. Small pericardial effusion. No  significant mediastinal, hilar or axillary lymphadenopathy. Asymmetric  elevation of the left hemidiaphragm as compared to the right.     Lung/pleura: Trace left pleural effusion. No significant right pleural  effusion. No significant pneumothorax. Significant interval  improvement in the previously seen right lower lobe consolidative  pulmonary opacities as compared to 2/19/2022 outside CT. Only small  patchy foci of opacity in the medial aspect of the right lower lobe is  seen.      Upper abdomen: Limited evaluation of the upper abdomen due to lack of  coverage and contrast. Cholelithiasis without CT evidence of acute  cholecystitis. There is approximately 8.7 x 7.9 x 9.3 cm (axial and CC  dimensions, respectively), fatty area with minimal fat stranding in  the left upper quadrant retroperitoneal space just above the superior  pole of the kidney and abutting the adjacent adrenal gland, kidney,  pancreas and spleen. The spleen is enlarged measuring 15.5 cm in  craniocaudal dimension.     Bones and soft tissue: Multilevel degenerative changes of the spine.  Diffuse heterogenous appearance of the spine of  indeterminate  etiology.                                                                      IMPRESSION:    1. Significant interval improvement in the previously seen right lower  lobe consolidative pulmonary opacity as compared to 2/19/2022 outside  CT. Only small patchy opacity in the medial aspect of the right lower  lobe remaining.  2. Small pericardial effusion.  3. Trace left pleural effusion.  4. There is approximately 8.7 x 7.9 x 9.3 cm fat attenuating area with  minimal fat stranding in the left upper quadrant, consistent with the  biopsy-proven lipomatous tumor.  5. Cholelithiasis without CT evidence of acute cholecystitis.  6. Splenomegaly.  7. Multilevel degenerative changes of the spine with diffuse  heterogenous appearance of the thoracic spine of indeterminate  Etiology.    CT AP 9/20/22:  IMPRESSION:   1.  There are two fat-containing left retroperitoneal masses that  appears stable in size. One of these at the left adrenal fossa  contacting the left adrenal and left kidney measures up to 10.5 cm  with ill-defined internal increased density. Another at the left  paraspinous retroperitoneum associated with atrophy of the left psoas  is also stable. The second lesion has hazy internal increased density  as well. This could just be stable complex lipomas. More aggressive  forms of retroperitoneal fat containing mass such as liposarcoma  cannot be entirely excluded. Therefore, recommend a surveillance CT to  establish long-term stability. If there are older studies that  includes this lesion for imaging, these would be useful for review.  Recommend surveillance CT in six to 12 months.  2.  There are two hypodense but indeterminate nodules within the  liver. One of these appears stable, but another is new at the right  hepatic dome compared to 2/19/2022. Therefore, recommend liver MRI for  further imaging workup.  3.  Cholelithiasis.    CT Liver 11/17/22:  IMPRESSION:   1.  The previously described new  hepatic nodular lesion at segment 8  is not currently seen. The previously described hypodense nodular mass  at the posterior right liver along segment 6/7 is stable in size, but  remains indeterminant with a neoplastic etiology being possible.  Recommend follow-up imaging to establish long-term stability.  2.  Retroperitoneal fat density lesions appears stable in size. One of  these abuts the left adrenal and the second is at the left psoas  region with psoas atrophy. Recommend further attention to these at  imaging follow-up.  3.  Cholelithiasis.      ASSESSMENT/PLAN:  Kimo Greenwood is a 42 year old male who is referred for elevated WBC, anemia, splenomegaly. 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    1) Chronic anemia and intermittent neutrophilia  -Patient has evidence of chronic, intermittent neutrophilia and NC/NC anemia.  -Asymptomatic.  -Repeat CBC in July 2022 with persistent neutrophilia and NC/NC anemia. Negative for dysplasia or evidence of blasts.   -Iron studies, B12, folate, TSH WNL in July 2022.  -FLOW cytometry normal 7/2022.  -CT CAP is showing normal spleen size.   -Current CBC showing stable, intermittent neutrophilia. Stable hemoglobin at 12.5.   -JAK2/MPL/CALR negative.   -Hold on bone marrow biopsy at this time.    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  -9/2022 scans show stability.  -11/2022 scans show stability.  -He is planned for repeat imaging in 6 months.  -Followed by Dr. Ojeda.    3) Liver lesions  -CT 11/17/22 did not visualize the previous lesion at segment 9; he still has a stable nodular mass of the posterior right liver 6/7.    4) History of MDD    5) History of HTN, LOREN    6)  BRBPR, one time episode  -Patient with history of EGD and  colonoscopy in 2020 and told 5 year follow up. Patient has history of hemorrhoids. Discussed if this is continued, he will need repeat endoscopy.     7) Recheck labs and CT liver in 6 months with follow up then. Unable to perform MRI. Follow up with Dr. Ojeda as scheduled.      Danna Amato DO  Hematology/Oncology  [facility]

## 2022-12-30 NOTE — NURSING NOTE
"Oncology Rooming Note    December 30, 2022 11:19 AM   Kimo Greenwood is a 42 year old male who presents for:    Chief Complaint   Patient presents with     Oncology Clinic Visit     Leukocytosis, unspecified type     Initial Vitals: /74 (Cuff Size: Adult Large)   Pulse 100   Temp 97  F (36.1  C) (Tympanic)   Resp 22   Ht 1.791 m (5' 10.5\")   Wt (!) 180.5 kg (398 lb)   SpO2 98%   BMI 56.30 kg/m   Estimated body mass index is 56.3 kg/m  as calculated from the following:    Height as of this encounter: 1.791 m (5' 10.5\").    Weight as of this encounter: 180.5 kg (398 lb). Body surface area is 3 meters squared.  Extreme Pain (9) Comment: Data Unavailable   No LMP for male patient.  Allergies reviewed: Yes  Medications reviewed: Yes    Medications: Medication refills not needed today.  Pharmacy name entered into [employer]:    [facility] [id number] - [place], [place] - [address] [facility] [place] - [address] [facility] - [place], [place] - [address]    Clinical concerns: f/u       Dalia Perez CMA              "

## 2022-12-30 NOTE — PROGRESS NOTES
Medical Assistant Note:  Kimo Greenwood presents today for blood draw.    Patient seen by provider today: Yes: .   present during visit today: Not Applicable.    Concerns: No Concerns.    Procedure:  Lab draw site: right had, Needle type: butterfly, Gauge: 23.    Post Assessment:  Labs drawn without difficulty: No.    Discharge Plan:  Departure Mode: Ambulatory.    Face to Face Time: 15.    Rachelle Meyer, CMA

## 2022-12-30 NOTE — LETTER
12/30/2022         RE: Kimo Greenwood  [address]  [place] [address]        Dear Colleague,    Thank you for referring your patient, Kimo Greenwood, to the [facility]. Please see a copy of my visit note below.    [facility]    Hematology/Oncology Established Patient Note      Today's Date: 12/30/22    Reason for Consultation: Elevated WBC  Referring Provider: Kory Hudson MD      HISTORY OF PRESENT ILLNESS: Kimo Greenwood is a 42 year old male who is referred for elevated WBC, anemia, splenomegaly. 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    Patient has evidence of chronic, intermittent leukocytosis since 2012. Specifically, he has had neutrophilia. On 4/22/22, WBC was 11.1, ANC 9.7. On 4/27/22, WBC was 8.0, ANC 4.8. Patient also has evidence of chronic, intermittent NCNC anemia. Record review shows gillian of 9.1 in 2017. Patient and mother unable to recall the events of that timeframe.     Of note, patient was hospitalized for pneumonia earlier this year. Per record review, an incidental mass was noted an 8.7 cm lipomatous mass in the retroperitoneum near the kidney and spleen. He was evaluated by surgical oncology, Dr. Ojeda, with biopsy on 3/8/22 returning as lipomatous tumor/liposarcoma, negative for MDM2. He is due for follow up imaging in September 2022 with follow in surg onc clinic.    A repeat CT chest was done on 3/23/22, which returned with significant improvement RLL pneumonia. It continued to show the 8.7 x 7.9 x 9.3 cm RP mass in the LUQ. The spleen was enlarged to 15.5 cm. No other adenopathy.     Patient is present with mother. They report only new medication is liraglutide for weight management. No weight loss, fever, drenching night sweats, or LAD.     Of note,  patient lives at [facility]. He is able to perform ADLs. He does smoke cigarettes.       INTERIM HISTORY:  Most recent scans showed stable RP mass, resolution of the hepatic lesion at segment 8 and stable segment 6/7 lesion. No weight loss, fever, LAD. He states one time episode of BRBPR (last colonoscopy 2020, this was performed due to colitis in mother, told 5 year follow up). He has chronic left hip pain s/p MARGO.       REVIEW OF SYSTEMS:   A 14 point ROS was reviewed with pertinent positives and negatives in the HPI.        HOME MEDICATIONS:  Current Outpatient Medications   Medication Sig Dispense Refill     albuterol (VENTOLIN HFA) 108 (90 Base) MCG/ACT inhaler INHALE 2 PUFFS INTO LUNGS EVERY SIX HOURS AS NEEDED FOR SHORTNESS OF BREATH / DYSPNEA OR WHEEZING 18 g 1     calcium polycarbophil (FIBER-LAX) 625 MG tablet Take 2 tablets (1,250 mg) by mouth 2 times daily 360 tablet 0     DULoxetine (CYMBALTA) 60 MG capsule Take 120 mg by mouth daily        hydrochlorothiazide (HYDRODIURIL) 25 MG tablet TAKE 1 TABLET BY MOUTH ONCE DAILY 30 tablet 5     insulin pen needle (31G X 6 MM) 31G X 6 MM miscellaneous Use 1 pen needles daily or as directed. 100 each 0     Lidocaine (LIDOCARE) 4 % Patch Place 1-2 patches onto the skin every 24 hours To prevent lidocaine toxicity, patient should be patch free for 12 hrs daily. (Patient not taking: No sig reported) 60 patch 1     liraglutide - Weight Management (SAXENDA) 18 MG/3ML pen Inject 3 mg subcutaneously daily. 15 mL 1     lisinopril (ZESTRIL) 10 MG tablet TAKE 1 TABLET BY MOUTH EVERY MORNING 30 tablet 5     mirabegron (MYRBETRIQ) 50 MG 24 hr tablet Take 1 tablet by mouth daily       naproxen sodium 220 MG capsule If Tylenol is not helpful, this can be added 2 tabs twice a day with meals. (Patient taking differently: Take 440 mg by mouth 2 times daily (with meals)) 60 capsule 3     OLANZapine-samidorphan (LYBALVI) 10-10 MG TABS tablet Take 1 tablet by mouth At Bedtime        order for DME Equipment being ordered: CPAP supplies 1 each 0     oxybutynin ER (DITROPAN XL) 15 MG 24 hr tablet Take 30 mg by mouth daily       pantoprazole (PROTONIX) 40 MG EC tablet TAKE 1 TABLET BY MOUTH ONCE DAILY 30-60 MINUTES BR FIRST MEAL OF THE DAY 30 tablet 5     prazosin (MINIPRESS) 1 MG capsule Take 3 mg by mouth At Bedtime       VITAMIN D3 50 MCG (2000 UT) tablet TAKE ONE TABLET BY MOUTH EVERY DAY 90 tablet 3         ALLERGIES:  Allergies   Allergen Reactions     Other [No Clinical Screening - See Comments] Other (See Comments)     Awoke from anesthesia with anger and ripping off dressing, after interstim placement, outside hospital 2013         PAST MEDICAL HISTORY:  Past Medical History:   Diagnosis Date     Arthritis     DDD hips and knees     Asthma      Asthma      Patel's esophagus      Chronic pain      Chronic pain - left hip/leg pain     degenerative disc disease, back, hips, knees     Gastroesophageal reflux disease      History of anesthesia complications     agitation x1 after interstim 2013     Hypertension      Hypertension      Leukocytosis      LPRD (laryngopharyngeal reflux disease)      Marijuana abuse      Marijuana abuse      MDD (major depressive disorder)      MDD (major depressive disorder)      Mental retardation, mild (I.Q. 50-70)      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    With associated speech/language delay     Obesity 11/11/2010     LOREN (obstructive sleep apnea)     Uses a CPAP     LOREN (obstructive sleep apnea)      Seborrheic dermatitis      Seborrheic dermatitis      Sleep apnea     CPAP         PAST SURGICAL HISTORY:  Past Surgical History:   Procedure Laterality Date     ARTHROPLASTY HIP Left 1/25/2017    Procedure: ARTHROPLASTY HIP;  Surgeon: Bala Randall MD;  Location: [facility] OR     ARTHROPLASTY HIP Left      ARTHROPLASTY REVISION HIP Left 6/24/2019    Procedure: Revision left total hip arthroplasty with a head and liner exchange to a Biomet  dual mobility head and liner.;  Surgeon: Bala Randall MD;  Location: [facility] OR     BLADDER SURGERY      Ibarra, [facility],Interstem stimulator implant     CLOSED REDUCTION HIP Left 6/10/2019    Procedure: Closed reduction under general anesthesia left recurrent prosthetic hip dislocation;  Surgeon: Rafat Cazares MD;  Location: [facility] OR     COLONOSCOPY N/A 10/30/2015    Procedure: COMBINED COLONOSCOPY, SINGLE OR MULTIPLE BIOPSY/POLYPECTOMY BY BIOPSY;  Surgeon: Alexis Jackson MD;  Location: [facility] GI     COLONOSCOPY N/A 11/17/2016    Procedure: COMBINED COLONOSCOPY, SINGLE OR MULTIPLE BIOPSY/POLYPECTOMY BY BIOPSY;  Surgeon: Cat Huber MD;  [facility]: [facility]     COLONOSCOPY N/A 10/28/2020    Procedure: COLONOSCOPY;  Surgeon: You Kraus MD;  Location: [facility] OR     COLONOSCOPY       ESOPHAGOSCOPY, GASTROSCOPY, DUODENOSCOPY (EGD), COMBINED N/A 11/17/2016    Procedure: COMBINED ESOPHAGOSCOPY, GASTROSCOPY, DUODENOSCOPY (EGD), BIOPSY SINGLE OR MULTIPLE;  Surgeon: Cat Huber MD;  [facility]     ESOPHAGOSCOPY, GASTROSCOPY, DUODENOSCOPY (EGD), COMBINED N/A 3/12/2020    Procedure: ESOPHAGOGASTRODUODENOSCOPY WITH BIOPSIES;  Surgeon: You Kraus MD;  Location: [facility] OR     ESOPHAGOSCOPY, GASTROSCOPY, DUODENOSCOPY (EGD), COMBINED N/A 10/28/2020    Procedure: ESOPHAGOGASTRODUODENOSCOPY, WITH BIOPSY;  Surgeon: You Kraus MD;  Location: [facility] OR     ESOPHAGOSCOPY, GASTROSCOPY, DUODENOSCOPY (EGD), COMBINED       EXAM UNDER ANESTHESIA, FULGURATE CONDYLOMA ANUS, COMBINED N/A 12/22/2016    Procedure: COMBINED EXAM UNDER ANESTHESIA, FULGURATE CONDYLOMA ANUS;  Surgeon: Nya Cabello MD;  [facility]     GENITOURINARY SURGERY       JOINT REPLACEMENT Left 2017    MARGO, Revision Left MARGO     OTHER SURGICAL HISTORY      interstim stimulator implant     [place] CYSTOURETHROSCOPY INJ CHEMODENERVATION BLADDER N/A 1/16/2019    Procedure: CYSTOSCOPY BOTOX BLADDER INJECTIONS (100  "UNITS IN 10CC);  Surgeon: Didier Ibarra MD;  Location: [facility] OR;  Service: Urology     [place] IMPLANT PERIPH/GASTRIC NEUROSTIM/ N/A 1/8/2020    Procedure: NEW INTERSTIM LEAD, REPLACE OLD; NEW INTERSTIM BATTERY, REPLACE OLD;  Surgeon: Didier Ibarra MD;  Location: [facility];  Service: Urology         SOCIAL HISTORY:  Social History     Socioeconomic History     Marital status: Single     Spouse name: Not on file     Number of children: Not on file     Years of education: Not on file     Highest education level: Not on file   Occupational History     Not on file   Tobacco Use     Smoking status: Every Day     Packs/day: 1.00     Years: 1.00     Pack years: 1.00     Types: Vaping Device, Cigarettes     Smokeless tobacco: Current     Types: Chew     Tobacco comments:     Smokes E Cigs equal to 1 PPD   Vaping Use     Vaping Use: Every day     Substances: Nicotine, Flavoring     Devices: Refillable tank   Substance and Sexual Activity     Alcohol use: Yes     Comment: socially--\"not very often\" \"once a month\"     Drug use: No     Comment: patient denies any marijuana use     Sexual activity: Yes     Partners: Female     Birth control/protection: Condom   Other Topics Concern     Parent/sibling w/ CABG, MI or angioplasty before 65F 55M? No   Social History Narrative     Not on file     Social Determinants of Health     Financial Resource Strain: Not on file   Food Insecurity: Not on file   Transportation Needs: Not on file   Physical Activity: Not on file   Stress: Not on file   Social Connections: Not on file   Intimate Partner Violence: Not At Risk     Fear of Current or Ex-Partner: No     Emotionally Abused: No     Physically Abused: No     Sexually Abused: No   Housing Stability: Not on file         FAMILY HISTORY:  Family History   Problem Relation Age of Onset     Anxiety Disorder Mother      Depression Mother      Ulcerative Colitis Mother 18     Breast Cancer Maternal Grandmother      " "Cerebrovascular Disease Maternal Grandmother      Breast Cancer Maternal Aunt      Cancer Maternal Grandfather         grt uncles colon cancer         PHYSICAL EXAM:  Vital signs:  /74 (Cuff Size: Adult Large)   Pulse 100   Temp 97  F (36.1  C) (Tympanic)   Resp 22   Ht 1.791 m (5' 10.5\")   Wt (!) 180.5 kg (398 lb)   SpO2 98%   BMI 56.30 kg/m       GENERAL/CONSTITUTIONAL: No acute distress.  LYMPH: No anterior cervical, posterior cervical, supraclavicular, axillary or inguinal adenopathy.   RESPIRATORY: Clear to auscultation bilaterally. No crackles or wheezing.   CARDIOVASCULAR: Regular rate and rhythm without murmurs, gallops, or rubs.  GASTROINTESTINAL: No hepatosplenomegaly, masses, or tenderness. The patient has normal bowel sounds. No guarding.  No distention.  MUSCULOSKELETAL: Warm and well-perfused, no cyanosis, clubbing, or edema.   NEUROLOGIC: Cranial nerves II-XII are intact. Alert, oriented, answers questions appropriately.  INTEGUMENTARY: No rashes or jaundice.  GAIT: Antalgic gait.      LABS:   Latest Reference Range & Units 12/30/22 11:02   WBC 4.0 - 11.0 10e3/uL 11.5 (H)   Hemoglobin 13.3 - 17.7 g/dL 12.5 (L)   Hematocrit 40.0 - 53.0 % 36.6 (L)   Platelet Count 150 - 450 10e3/uL 247   RBC Count 4.40 - 5.90 10e6/uL 4.29 (L)   MCV 78 - 100 fL 85   MCH 26.5 - 33.0 pg 29.1   MCHC 31.5 - 36.5 g/dL 34.2   RDW 10.0 - 15.0 % 13.2   % Neutrophils % 77   % Lymphocytes % 18   % Monocytes % 4   % Eosinophils % 1   % Basophils % 0   Absolute Basophils 0.0 - 0.2 10e3/uL 0.1   Absolute Eosinophils 0.0 - 0.7 10e3/uL 0.1   Absolute Immature Granulocytes <=0.4 10e3/uL 0.1   Absolute Lymphocytes 0.8 - 5.3 10e3/uL 2.1   Absolute Monocytes 0.0 - 1.3 10e3/uL 0.4   % Immature Granulocytes % 0   Absolute Neutrophils 1.6 - 8.3 10e3/uL 8.8 (H)   Absolute NRBCs 10e3/uL 0.0   NRBCs per 100 WBC <1 /100 0       Interpretation    No mutations were identified in the analyzed gene regions of JAK2, CALR, or MPL genes. "         PATHOLOGY:  Surgical Pathology Report                         Case: [id number]                                   Authorizing Provider:  Star Ojeda MD         Collected:           03/08/2022 11:21 AM           Ordering Location:     [facility]   Received:            03/08/2022 01:49 PM                                  Imaging                                                                       Pathologist:           Heather Sidhu MD                                                            Specimen:    Retroperitoneal, Retroperitoneal Biopsy                                                    Final Diagnosis   Retroperitoneal mass, CT-guided needle biopsy:  -Mature adipose tissue, consistent with lipomatous tumor.  Limited sample.  See COMMENT.   Electronically signed by Heather Sidhu MD on 3/14/2022 at  2:36 PM   Comment    The morphologic findings are concerning for liposarcoma.  The biopsy is limited in nature, comprising of needle biopsy cores of lipomatous tissue.  The biopsy tissue may not be representative of the radiographically identified large (reported 8.7 cm) mass.  Fluorescence in-situ hybridization (FISH) for MDM2 probe is pending at this time (please see separate forthcoming [facility] cytogenetics laboratory report for results).       INTERPRETATION:  None (0%) of the interphase cells examined had a signal pattern indicative of amplification of MDM2.      Final Diagnosis 7/21/22:   Peripheral blood for morphology:  -Mild normochromic, normocytic anemia without evidence of red cell regeneration  -Leukocytosis with mature neutrophilia; leukocytes without morphologic abnormalities     Final Diagnosis 8/25/22:   Peripheral blood for morphology:  -Mild normochromic, normocytic anemia without evidence of red cell regeneration  -Leukocytosis with mature neutrophilia; leukocytes without morphologic abnormalities       Case Report   Flow Cytometry Report                              Case: [id number]                                   Authorizing Provider:  Danna Amato DO         Collected:           08/25/2022 08:36 AM           Ordering Location:     [facility]   Received:            08/25/2022 09:33 AM                                  [facility]                                                             Pathologist:           Denise Lee MD                                                     Specimen:    Peripheral Blood                                                                           Flow Interpretation   A. Peripheral Blood:  -Polytypic B cells  -No aberrant immunophenotype on T cells  -Rare to absent CD34-positive blasts  -See comment   Electronically signed by Denise Lee MD on 8/26/2022 at 12:10 PM   Comment    There is no immunophenotypic evidence of non-Hodgkin lymphoma, lymphoid leukemia, or high grade myeloid neoplasm.         IMAGING:  CT CHEST WITHOUT CONTRAST March 23, 2022  FINDINGS:   Chest/mediastinum: No cardiomegaly. Small pericardial effusion. No  significant mediastinal, hilar or axillary lymphadenopathy. Asymmetric  elevation of the left hemidiaphragm as compared to the right.     Lung/pleura: Trace left pleural effusion. No significant right pleural  effusion. No significant pneumothorax. Significant interval  improvement in the previously seen right lower lobe consolidative  pulmonary opacities as compared to 2/19/2022 outside CT. Only small  patchy foci of opacity in the medial aspect of the right lower lobe is  seen.      Upper abdomen: Limited evaluation of the upper abdomen due to lack of  coverage and contrast. Cholelithiasis without CT evidence of acute  cholecystitis. There is approximately 8.7 x 7.9 x 9.3 cm (axial and CC  dimensions, respectively), fatty area with minimal fat stranding in  the left upper quadrant retroperitoneal space just above the superior  pole of the kidney and abutting  the adjacent adrenal gland, kidney,  pancreas and spleen. The spleen is enlarged measuring 15.5 cm in  craniocaudal dimension.     Bones and soft tissue: Multilevel degenerative changes of the spine.  Diffuse heterogenous appearance of the spine of indeterminate  etiology.                                                                      IMPRESSION:    1. Significant interval improvement in the previously seen right lower  lobe consolidative pulmonary opacity as compared to 2/19/2022 outside  CT. Only small patchy opacity in the medial aspect of the right lower  lobe remaining.  2. Small pericardial effusion.  3. Trace left pleural effusion.  4. There is approximately 8.7 x 7.9 x 9.3 cm fat attenuating area with  minimal fat stranding in the left upper quadrant, consistent with the  biopsy-proven lipomatous tumor.  5. Cholelithiasis without CT evidence of acute cholecystitis.  6. Splenomegaly.  7. Multilevel degenerative changes of the spine with diffuse  heterogenous appearance of the thoracic spine of indeterminate  Etiology.    CT AP 9/20/22:  IMPRESSION:   1.  There are two fat-containing left retroperitoneal masses that  appears stable in size. One of these at the left adrenal fossa  contacting the left adrenal and left kidney measures up to 10.5 cm  with ill-defined internal increased density. Another at the left  paraspinous retroperitoneum associated with atrophy of the left psoas  is also stable. The second lesion has hazy internal increased density  as well. This could just be stable complex lipomas. More aggressive  forms of retroperitoneal fat containing mass such as liposarcoma  cannot be entirely excluded. Therefore, recommend a surveillance CT to  establish long-term stability. If there are older studies that  includes this lesion for imaging, these would be useful for review.  Recommend surveillance CT in six to 12 months.  2.  There are two hypodense but indeterminate nodules within the  liver.  One of these appears stable, but another is new at the right  hepatic dome compared to 2/19/2022. Therefore, recommend liver MRI for  further imaging workup.  3.  Cholelithiasis.    CT Liver 11/17/22:  IMPRESSION:   1.  The previously described new hepatic nodular lesion at segment 8  is not currently seen. The previously described hypodense nodular mass  at the posterior right liver along segment 6/7 is stable in size, but  remains indeterminant with a neoplastic etiology being possible.  Recommend follow-up imaging to establish long-term stability.  2.  Retroperitoneal fat density lesions appears stable in size. One of  these abuts the left adrenal and the second is at the left psoas  region with psoas atrophy. Recommend further attention to these at  imaging follow-up.  3.  Cholelithiasis.      ASSESSMENT/PLAN:  Kimo Greenwood is a 42 year old male who is referred for elevated WBC, anemia, splenomegaly. 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    1) Chronic anemia and intermittent neutrophilia  -Patient has evidence of chronic, intermittent neutrophilia and NC/NC anemia.  -Asymptomatic.  -Repeat CBC in July 2022 with persistent neutrophilia and NC/NC anemia. Negative for dysplasia or evidence of blasts.   -Iron studies, B12, folate, TSH WNL in July 2022.  -FLOW cytometry normal 7/2022.  -CT CAP is showing normal spleen size.   -Current CBC showing stable, intermittent neutrophilia. Stable hemoglobin at 12.5.   -JAK2/MPL/CALR negative.   -Hold on bone marrow biopsy at this time.    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  -9/2022 scans show stability.  -11/2022 scans show stability.  -He is planned for repeat imaging in 6 months.  -Followed by Dr. Ojeda.    3) Liver  lesions  -CT 11/17/22 did not visualize the previous lesion at segment 9; he still has a stable nodular mass of the posterior right liver 6/7.    4) History of MDD    5) History of HTN, LOREN    6)  BRBPR, one time episode  -Patient with history of EGD and colonoscopy in 2020 and told 5 year follow up. Patient has history of hemorrhoids. Discussed if this is continued, he will need repeat endoscopy.     7) Recheck labs and CT liver in 6 months with follow up then. Unable to perform MRI. Follow up with Dr. Ojeda as scheduled.      Danna Amato DO  Hematology/Oncology  [facility]        Again, thank you for allowing me to participate in the care of your patient.        Sincerely,        Danna Amato DO

## 2023-01-02 ENCOUNTER — MEDICAL CORRESPONDENCE (OUTPATIENT)
Dept: HEALTH INFORMATION MANAGEMENT | Facility: CLINIC | Age: 43
End: 2023-01-02

## 2023-01-04 ENCOUNTER — MYC MEDICAL ADVICE (OUTPATIENT)
Dept: FAMILY MEDICINE | Facility: CLINIC | Age: 43
End: 2023-01-04

## 2023-01-04 DIAGNOSIS — Z74.09 MOBILITY IMPAIRED: Primary | ICD-10-CM

## 2023-01-09 ENCOUNTER — LAB (OUTPATIENT)
Dept: LAB | Facility: CLINIC | Age: 43
End: 2023-01-09
Payer: COMMERCIAL

## 2023-01-09 ENCOUNTER — TELEPHONE (OUTPATIENT)
Dept: ENDOCRINOLOGY | Facility: CLINIC | Age: 43
End: 2023-01-09

## 2023-01-09 ENCOUNTER — OFFICE VISIT (OUTPATIENT)
Dept: ENDOCRINOLOGY | Facility: CLINIC | Age: 43
End: 2023-01-09
Payer: COMMERCIAL

## 2023-01-09 VITALS
HEIGHT: 72 IN | WEIGHT: 315 LBS | HEART RATE: 89 BPM | BODY MASS INDEX: 42.66 KG/M2 | OXYGEN SATURATION: 99 % | DIASTOLIC BLOOD PRESSURE: 83 MMHG | SYSTOLIC BLOOD PRESSURE: 123 MMHG

## 2023-01-09 DIAGNOSIS — E66.813 CLASS 3 SEVERE OBESITY DUE TO EXCESS CALORIES WITH SERIOUS COMORBIDITY AND BODY MASS INDEX (BMI) OF 50.0 TO 59.9 IN ADULT (H): Primary | ICD-10-CM

## 2023-01-09 DIAGNOSIS — E66.813 CLASS 3 SEVERE OBESITY DUE TO EXCESS CALORIES WITH SERIOUS COMORBIDITY AND BODY MASS INDEX (BMI) OF 50.0 TO 59.9 IN ADULT (H): ICD-10-CM

## 2023-01-09 DIAGNOSIS — F17.200 TOBACCO USE DISORDER: ICD-10-CM

## 2023-01-09 DIAGNOSIS — K22.70 BARRETT'S ESOPHAGUS DETERMINED BY BIOPSY: ICD-10-CM

## 2023-01-09 DIAGNOSIS — G47.33 OSA (OBSTRUCTIVE SLEEP APNEA): ICD-10-CM

## 2023-01-09 DIAGNOSIS — E66.01 CLASS 3 SEVERE OBESITY DUE TO EXCESS CALORIES WITH SERIOUS COMORBIDITY AND BODY MASS INDEX (BMI) OF 50.0 TO 59.9 IN ADULT (H): ICD-10-CM

## 2023-01-09 DIAGNOSIS — E66.01 CLASS 3 SEVERE OBESITY DUE TO EXCESS CALORIES WITH SERIOUS COMORBIDITY AND BODY MASS INDEX (BMI) OF 50.0 TO 59.9 IN ADULT (H): Primary | ICD-10-CM

## 2023-01-09 LAB
ALBUMIN SERPL BCG-MCNC: 3.9 G/DL (ref 3.5–5.2)
ALP SERPL-CCNC: 87 U/L (ref 40–129)
ALT SERPL W P-5'-P-CCNC: 28 U/L (ref 10–50)
ANION GAP SERPL CALCULATED.3IONS-SCNC: 9 MMOL/L (ref 7–15)
AST SERPL W P-5'-P-CCNC: 16 U/L (ref 10–50)
BILIRUB SERPL-MCNC: 0.3 MG/DL
BUN SERPL-MCNC: 13.9 MG/DL (ref 6–20)
CALCIUM SERPL-MCNC: 9.6 MG/DL (ref 8.6–10)
CHLORIDE SERPL-SCNC: 101 MMOL/L (ref 98–107)
CHOLEST SERPL-MCNC: 177 MG/DL
CREAT SERPL-MCNC: 0.94 MG/DL (ref 0.67–1.17)
DEPRECATED HCO3 PLAS-SCNC: 28 MMOL/L (ref 22–29)
ERYTHROCYTE [DISTWIDTH] IN BLOOD BY AUTOMATED COUNT: 13.9 % (ref 10–15)
GFR SERPL CREATININE-BSD FRML MDRD: >90 ML/MIN/1.73M2
GLUCOSE SERPL-MCNC: 89 MG/DL (ref 70–99)
HBA1C MFR BLD: 5.6 %
HCT VFR BLD AUTO: 35.3 % (ref 40–53)
HDLC SERPL-MCNC: 49 MG/DL
HGB BLD-MCNC: 11.8 G/DL (ref 13.3–17.7)
LDLC SERPL CALC-MCNC: 117 MG/DL
MCH RBC QN AUTO: 28.4 PG (ref 26.5–33)
MCHC RBC AUTO-ENTMCNC: 33.4 G/DL (ref 31.5–36.5)
MCV RBC AUTO: 85 FL (ref 78–100)
NONHDLC SERPL-MCNC: 128 MG/DL
PLATELET # BLD AUTO: 259 10E3/UL (ref 150–450)
POTASSIUM SERPL-SCNC: 4.1 MMOL/L (ref 3.4–5.3)
PROT SERPL-MCNC: 7.2 G/DL (ref 6.4–8.3)
PTH-INTACT SERPL-MCNC: 46 PG/ML (ref 15–65)
RBC # BLD AUTO: 4.16 10E6/UL (ref 4.4–5.9)
SODIUM SERPL-SCNC: 138 MMOL/L (ref 136–145)
TRIGL SERPL-MCNC: 56 MG/DL
WBC # BLD AUTO: 14.1 10E3/UL (ref 4–11)

## 2023-01-09 PROCEDURE — 99215 OFFICE O/P EST HI 40 MIN: CPT | Mod: GT

## 2023-01-09 PROCEDURE — 83970 ASSAY OF PARATHORMONE: CPT | Performed by: PATHOLOGY

## 2023-01-09 PROCEDURE — 85027 COMPLETE CBC AUTOMATED: CPT | Performed by: PATHOLOGY

## 2023-01-09 PROCEDURE — 36415 COLL VENOUS BLD VENIPUNCTURE: CPT | Performed by: PATHOLOGY

## 2023-01-09 PROCEDURE — 82306 VITAMIN D 25 HYDROXY: CPT

## 2023-01-09 PROCEDURE — 80061 LIPID PANEL: CPT | Performed by: PATHOLOGY

## 2023-01-09 PROCEDURE — 80053 COMPREHEN METABOLIC PANEL: CPT | Performed by: PATHOLOGY

## 2023-01-09 PROCEDURE — 83036 HEMOGLOBIN GLYCOSYLATED A1C: CPT

## 2023-01-09 RX ORDER — SEMAGLUTIDE 1 MG/.5ML
1 INJECTION, SOLUTION SUBCUTANEOUS WEEKLY
Qty: 2 ML | Refills: 1 | Status: SHIPPED | OUTPATIENT
Start: 2023-01-09 | End: 2023-01-24

## 2023-01-09 ASSESSMENT — PAIN SCALES - GENERAL: PAINLEVEL: WORST PAIN (10)

## 2023-01-09 NOTE — TELEPHONE ENCOUNTER
"Prior Authorization Retail Medication Request    Medication/Dose: Wegovy  ICD code (if different than what is on RX):  Class 3 severe obesity due to excess calories with serious comorbidity and body mass index (BMI) of 50.0 to 59.9 in adult (H) [E66.01, Z68.43]  - Primary     Previously Tried and Failed:  Saxenda, Topiramate, Phentermine, history of diet and exercise  Rationale:  I had the pleasure of seeing your patient, Kimo Greenwood, to evaluate his obesity and consider him for possible weight loss surgery. As you know, Kimo Greenwood is 42 year old.  He has a height of 6' 0\", a weight of 407 lbs 9.6 oz, and calculated Body mass index is 55.28 kg/m .     Obesity in early 20s. Weight gain has been gradual in the past. Has gained 50lbs in the past few months due to decrease in activity and diet. Was 350lbs in fall of 2021 Needs to lose weight in order to get a hip replacement goal weight of 330lbs. Was on Saxenda and Topiramate for one month, but topiramate was discontinued due to be ineffective.    Hip OA - needing hip replacement.      LOREN - uses CPAP every night. Followed by PCP      Barretts esophagus - has not had any GERD symptoms recently. Well controlled on PPI.      HTN - well controlled on medications      Degenerative disk disease - causes low back pain. Limits activity     Disabled, lives at [facility] home    Depression - mood is well controlled on medications. Followed by therapist and psychiatrist at [facility]     AOMs:  Saxenda 3.0mg - has not taken it for 1.5 weeks. No side effects   Topiramate - no effect on weight   Phentermine - no effect on weight       Insurance Name:    Insurance ID:        Pharmacy Information (if different than what is on RX)  Name:  [facility] - [place], [place] - [address]  Phone:  [phone]  "

## 2023-01-09 NOTE — LETTER
"Dear Primary Care Provider:    Re: Kimo Greenwood  : 1980    Thank you for coordinating with us to evaluate your patient for possible bariatric surgery. It is important to us that the primary care provider is aware of their patients' desire to have weight loss surgery and is supportive. Please enter a letter of support via Epic or fax to Elle Butler RN at [phone] prior to the patient's next visit in our clinic.  Our mutual patient will also meet with our anesthesia team prior to surgery for a pre-op history and physical.    Sample letter:    \"Dear Weight Loss Surgery Clinic,    I am the primary care provider for (patient name) and I support (him/her) having weight loss surgery.    Sincerely,    (provider name)\"    If patient meets criteria and clearances for surgery, we will submit to the insurance company for insurance approval and coordinate the needed appointments with our department.     If you have any questions, feel free to contact us via our Call Center at [phone].  Please fax the evaluation to [phone] or route to Elle Butler RN via Epic.    Sincerely,    MD Blayne Pollock MD Eric Wise, MD Kristi Kopacz, PA-C Kayla Gish, CNP,  Nya Lara PA-C    Mailing Address:  [facility]  [address] [place] [address], [facility] [address], [place] [address]      "

## 2023-01-09 NOTE — LETTER
"2023       RE: Kimo Greenwood  [address]  [place] [address]     Dear Colleague,    Thank you for referring your patient, Kimo Greenwood, to the [facility]. Please see a copy of my visit note below.    90 minutes spent on the date of the encounter doing chart review, history and exam, documentation and further activities per the note    New Bariatric Surgery Consultation Note    2023    RE: Kimo Greenwood  MR#: [id number]  : 1980      Referring provider:       2023   Who referred you?        Chief Complaint/Reason for visit: evaluation for possible weight loss surgery    Dear Ciro Love MD (General),    I had the pleasure of seeing your patient, Kimo Greenwood, to evaluate his obesity and consider him for possible weight loss surgery. As you know, Kimo Greenwood is 42 year old.  He has a height of 6' 0\", a weight of 407 lbs 9.6 oz, and calculated Body mass index is 55.28 kg/m .    Assessment & Plan   Problem List Items Addressed This Visit        Respiratory    LOREN (obstructive sleep apnea)    Relevant Orders    Adult Sleep Eval & Management  Referral       Digestive    Class 3 severe obesity with serious comorbidity and body mass index (BMI) of 50.0 to 59.9 in adult (H) - Primary     Obesity onset in early 20s. Weight gain gradual. Was previously able to lose weight with the help of Saxenda. In  was 350lbs, but Saxenda seems to not be working as well and weight has continued to increase. Needs to lose weight to qualify for total hip replacement - goal of <330lbs.     History includes - LOREN, GERD, astham, garcia's esophagus, LBP, HTN, overactive bladder, and depression. Willard has an intellectual delay, but I have no concerns during my visit today. He was able to articulate his thoughts well and asked questions about " surgery. However, there seems to be some disconnect on what and how much he is eating. Group home staff stated he is eating much more then Willard states.     Discussed AOMs today:   - Phentermine - previously tried, but no effect on weight. No side effects   - Topiramate - previously tired, but no effect on weight. No side effects   - Naltrexone - can consider in the future   - GLP-1 - he is currently taking Victoza 3.0mg, but is no longer helping with hunger. Tolerating well, no side effects Will transition to Wegovy 1.0mg today due to not being on Victoza for the past 15 weeks. Can consider Ozempic if needed.            Relevant Medications    Semaglutide-Weight Management (WEGOVY) 1 MG/0.5ML SOAJ    Other Relevant Orders    CBC with platelets (Completed)    Comprehensive metabolic panel (Completed)    Hemoglobin A1c (Completed)    Lipid panel reflex to direct LDL Fasting (Completed)    Parathyroid Hormone Intact (Completed)    Vitamin D Deficiency (Completed)    Med Therapy Management Referral    Patel's esophagus determined by biopsy     GERD symptoms well controlled with PPI. Last EGD was in 2020. Will need to complete EGD prior to surgery. Can be done by Dr. Arzola or Dr. Madrigal. Patient to reach out if would like an order placed.             Behavioral    Tobacco use disorder     Patient is aware that smoking cessation is needed 3 months prior to surgery. Currently vapes nicotine daily. Is unsure if ready to quit. Will have further discussion with [facility] pharmacy.          Relevant Medications    Semaglutide-Weight Management (WEGOVY) 1 MG/0.5ML SOAJ      1. Bariatric pre-op labs ordered  2. Start Wegovy 1.0mg once weekly.   3. EGD needed prior to surgery - can be done by Dr. You Arzola who has previously completed  4. Schedule Psych eval.   5. LOS/clearance need:   - PCP  - Therapist  - Psychiatrist   - Sleep - referral placed  6. Smoking cessation needed - discuss with [facility] Pharmacist   7. Follow up with  [facility] pharmacy in 1 month for GLP-1 and smoking cessation   8. Dietitian visit needed 3x, and then montly until surgery   9. Dr. Madrigal visit in 1 month   10. Follow up with Nya Camarena in 2 months     HISTORY OF PRESENT ILLNESS:  Weight Loss History Reviewed with Patient 1/9/2023   How long have you been overweight? Since early childhood   What is the most that you have ever weighed? 397.5   I have tried the following weight loss medications? (Check all that apply) None   Have you ever had weight loss surgery? No     Obesity in early 20s. Weight gain has been gradual in the past. Has gained 50lbs in the past few months due to decrease in activity and diet. Was 350lbs in fall of 2022. Needs to lose weight in order to get a hip replacement goal weight of 330lbs. Was on Saxenda and Topiramate for one month, but topiramate was discontinued due to be ineffective. Mother says the weight is causing him to be home bound due hip pain.     Weight today - 407lbs    Eating 2 meals a day, with snacks. Trying to decrease pop. Meals are provided by group [place]. However, will go to the store to get candy, chip, cookies, crackers, ice cream, pudding, and soda. Has increased hunger. Struggles with getting full.   Breakfast - cereal with milk   Lunch - provided by group home  Dinner - pizza   Snacks - cookies, animal crackers   Pop - 1 liter of regular pop at a day    Activity - Hip pain limits activity. Will try to get up and walk around as much as possible. Cannot walk more then 50 yards at a time.     AOMs:  Saxenda 3.0mg - has not taken it for 1.5 weeks. No side effects. Was helping with weightloss, but seems to be less effective over the past 4 months.   Topiramate - no effect on weight   Phentermine - no effect on weight       CO-MORBIDITIES OF OBESITY INCLUDE:     1/9/2023   I have the following health issues associated with obesity: None of the above     Hip OA - needing hip replacement. Goal weight for surgery is 330lbs.     LOREN  - uses CPAP every night. Followed by PCP     Barretts esophagus - has not had any GERD symptoms recently. Well controlled on PPI. Last EGD was in 2020 by Dr. You Kraus.     HTN - well controlled on medications. Followed by PCP     Degenerative disk disease - causes low back pain. Limits activity     Over active bladder - has interstem. Followed by urology     History of bleeding or clotting disorder? No    Is patient on biologics or immunomodulators? No    PAST MEDICAL HISTORY:  Past Medical History:   Diagnosis Date     Arthritis     DDD hips and knees     Asthma      Asthma      Patel's esophagus      Chronic pain      Chronic pain - left hip/leg pain     degenerative disc disease, back, hips, knees     Gastroesophageal reflux disease      History of anesthesia complications     agitation x1 after interstim 2013     Hypertension      Hypertension      Leukocytosis      LPRD (laryngopharyngeal reflux disease)      Marijuana abuse      Marijuana abuse      MDD (major depressive disorder)      MDD (major depressive disorder)      Mental retardation, mild (I.Q. 50-70)      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    With associated speech/language delay     Obesity 11/11/2010     LOREN (obstructive sleep apnea)     Uses a CPAP     LOREN (obstructive sleep apnea)      Seborrheic dermatitis      Seborrheic dermatitis      Sleep apnea     CPAP       PAST SURGICAL HISTORY:  Past Surgical History:   Procedure Laterality Date     ARTHROPLASTY HIP Left 1/25/2017    Procedure: ARTHROPLASTY HIP;  Surgeon: Bala Randall MD;  Location: [facility] OR     ARTHROPLASTY HIP Left      ARTHROPLASTY REVISION HIP Left 6/24/2019    Procedure: Revision left total hip arthroplasty with a head and liner exchange to a Biomet dual mobility head and liner.;  Surgeon: Bala Randall MD;  Location: [facility] OR     BLADDER SURGERY      Alida Ibarra,Interste stimulator implant     CLOSED REDUCTION HIP Left 6/10/2019     Procedure: Closed reduction under general anesthesia left recurrent prosthetic hip dislocation;  Surgeon: Rafat Cazares MD;  Location: [facility] OR     COLONOSCOPY N/A 10/30/2015    Procedure: COMBINED COLONOSCOPY, SINGLE OR MULTIPLE BIOPSY/POLYPECTOMY BY BIOPSY;  Surgeon: Alexis Jackson MD;  Location: [facility] GI     COLONOSCOPY N/A 11/17/2016    Procedure: COMBINED COLONOSCOPY, SINGLE OR MULTIPLE BIOPSY/POLYPECTOMY BY BIOPSY;  Surgeon: Cat Huber MD;  [facility]: [facility]     COLONOSCOPY N/A 10/28/2020    Procedure: COLONOSCOPY;  Surgeon: You Kraus MD;  Location: [facility] OR     COLONOSCOPY       ESOPHAGOSCOPY, GASTROSCOPY, DUODENOSCOPY (EGD), COMBINED N/A 11/17/2016    Procedure: COMBINED ESOPHAGOSCOPY, GASTROSCOPY, DUODENOSCOPY (EGD), BIOPSY SINGLE OR MULTIPLE;  Surgeon: Cat Huber MD;  Location: [facility]     ESOPHAGOSCOPY, GASTROSCOPY, DUODENOSCOPY (EGD), COMBINED N/A 3/12/2020    Procedure: ESOPHAGOGASTRODUODENOSCOPY WITH BIOPSIES;  Surgeon: You Kraus MD;  Location: [facility]     ESOPHAGOSCOPY, GASTROSCOPY, DUODENOSCOPY (EGD), COMBINED N/A 10/28/2020    Procedure: ESOPHAGOGASTRODUODENOSCOPY, WITH BIOPSY;  Surgeon: You Kraus MD;  Location: [facility] OR     ESOPHAGOSCOPY, GASTROSCOPY, DUODENOSCOPY (EGD), COMBINED       EXAM UNDER ANESTHESIA, FULGURATE CONDYLOMA ANUS, COMBINED N/A 12/22/2016    Procedure: COMBINED EXAM UNDER ANESTHESIA, FULGURATE CONDYLOMA ANUS;  Surgeon: Nya Caebllo MD;  Location: [facility]     GENITOURINARY SURGERY       JOINT REPLACEMENT Left 2017    MARGO, Revision Left MARGO     OTHER SURGICAL HISTORY      interstim stimulator implant     FL CYSTOURETHROSCOPY INJ CHEMODENERVATION BLADDER N/A 1/16/2019    Procedure: CYSTOSCOPY BOTOX BLADDER INJECTIONS (100 UNITS IN 10CC);  Surgeon: Didier Ibarra MD;  Location: [facility];  Service: Urology     FL IMPLANT PERIPH/GASTRIC NEUROSTIM/ N/A 1/8/2020    Procedure: NEW INTERSTIM LEAD, REPLACE OLD;  NEW INTERSTIM BATTERY, REPLACE OLD;  Surgeon: Didier Ibarra MD;  Location: [facility];  Service: Urology       FAMILY HISTORY:   Family History   Problem Relation Age of Onset     Anxiety Disorder Mother      Depression Mother      Ulcerative Colitis Mother 18     Breast Cancer Maternal Grandmother      Cerebrovascular Disease Maternal Grandmother      Breast Cancer Maternal Aunt      Cancer Maternal Grandfather         grt uncles colon cancer       SOCIAL HISTORY:   Social History Questions Reviewed With Patient 1/9/2023   Which best describes your employment status (select all that apply) I am disabled   Which best describes your marital status: single   Do you have children? No   Who do you have in your support network that can be available to help you for the first 2 weeks after surgery? agency   Who can you count on for support throughout your weight loss surgery journey? fully supportive   Disabled, lives at [facility]. Very supportive family     HABITS:     1/9/2023   How often do you drink alcohol? Never   Do you currently use any of the following Nicotine products? e-cigarettes   Have you ever used any of the following nicotine products? E-cigarettes   If you previously used any of these products, what year did you quit? 2023   Have you or are you currently using street drugs or prescription strength medication for which you do not have a prescription for? No   Do you have a history of chemical dependency (alcohol or drug abuse)? No   ETOH - rarely   Vapes nicotine - 4-5x a day. Has never quit nicotine. Feels like he might be ready. Will discuss with [facility] pharmacy.   No THC      Currently taking narcotic/opioids No    PSYCHOLOGICAL HISTORY:   Psychological History Reviewed With Patient 1/9/2023   Have you ever attempted suicide? More than 10 years ago.   Have you had thoughts of suicide in the past year? No   Have you ever been hospitalized for mental illness or a  suicide attempt? In the last 1 to 5 years.   Do you have a history of chronic pain? Yes   Have you ever been diagnosed with fibromyalgia? No   Are you currently being treated for any of the following? (select all that apply) Depression   Are you currently seeing a therapist or counselor?  No   Are you currently seeing a psychiatrist? Yes   Depression - mood is well controlled on medications. Followed by therapist and psychiatrist at [facility]     ROS:     1/9/2023   Skin:  None of the above   HEENT: None of these   Musculoskeletal: None of the above   Cardiovascular: Shortness of breath with activity   Pulmonary: Shortness of breath with activity   Gastrointestinal: None of the above   Genitourinary: None of the above   Hematological: None of the above   Neurological: None of the above       EATING BEHAVIORS:     1/9/2023   Have you or anyone else thought that you had an eating disorder? No   Do you currently binge eat (eat a large amount of food in a short time)? Yes   Are you an emotional eater? Yes   Do you get up to eat after falling asleep? Yes       EXERCISE:     1/9/2023   How often do you exercise? Never   What keeps you from being more active?  Pain       MEDICATIONS:  Current Outpatient Medications   Medication Sig Dispense Refill     albuterol (VENTOLIN HFA) 108 (90 Base) MCG/ACT inhaler INHALE 2 PUFFS INTO LUNGS EVERY SIX HOURS AS NEEDED FOR SHORTNESS OF BREATH / DYSPNEA OR WHEEZING 18 g 1     calcium polycarbophil (FIBER-LAX) 625 MG tablet Take 2 tablets (1,250 mg) by mouth 2 times daily 360 tablet 0     DULoxetine (CYMBALTA) 60 MG capsule Take 120 mg by mouth daily        hydrochlorothiazide (HYDRODIURIL) 25 MG tablet TAKE 1 TABLET BY MOUTH ONCE DAILY 30 tablet 5     insulin pen needle (31G X 6 MM) 31G X 6 MM miscellaneous Use 1 pen needles daily or as directed. 100 each 0     lisinopril (ZESTRIL) 10 MG tablet TAKE 1 TABLET BY MOUTH EVERY MORNING 30 tablet 5     mirabegron (MYRBETRIQ) 50 MG 24 hr  tablet Take 1 tablet by mouth daily       naproxen sodium 220 MG capsule If Tylenol is not helpful, this can be added 2 tabs twice a day with meals. (Patient taking differently: Take 440 mg by mouth 2 times daily (with meals)) 60 capsule 3     OLANZapine-samidorphan (LYBALVI) 10-10 MG TABS tablet Take 1 tablet by mouth At Bedtime       order for DME Equipment being ordered: CPAP supplies 1 each 0     oxybutynin ER (DITROPAN XL) 15 MG 24 hr tablet Take 30 mg by mouth daily       pantoprazole (PROTONIX) 40 MG EC tablet TAKE 1 TABLET BY MOUTH ONCE DAILY 30-60 MINUTES BR FIRST MEAL OF THE DAY 30 tablet 5     prazosin (MINIPRESS) 1 MG capsule Take 3 mg by mouth At Bedtime       Semaglutide-Weight Management (WEGOVY) 1 MG/0.5ML SOAJ Inject 1 mg Subcutaneous once a week 2 mL 1     VITAMIN D3 50 MCG (2000 UT) tablet TAKE ONE TABLET BY MOUTH EVERY DAY 90 tablet 3     Lidocaine (LIDOCARE) 4 % Patch Place 1-2 patches onto the skin every 24 hours To prevent lidocaine toxicity, patient should be patch free for 12 hrs daily. 60 patch 1     liraglutide - Weight Management (SAXENDA) 18 MG/3ML pen Inject 3 mg subcutaneously daily. 15 mL 1       ALLERGIES:  Allergies   Allergen Reactions     Other [No Clinical Screening - See Comments] Other (See Comments)     Awoke from anesthesia with anger and ripping off dressing, after interstim placement, outside hospital 2013       EGD: 10/28/2020  Findings:        There were esophageal mucosal changes consistent with short-segment        Patel's esophagus present at the gastroesophageal junction. The        maximum longitudinal extent of these mucosal changes was 1 cm in length.        Mucosa was biopsied with a cold forceps for histology in a targeted        manner at the gastroesophageal junction. One specimen bottle was sent to        pathology. Estimated blood loss was minimal.        The stomach was normal.        The examined duodenum was normal.                                                                                      Impression:               - Esophageal mucosal changes consistent with                             short-segment Patel's esophagus. Biopsied.                             - Normal stomach.                             - Normal examined duodenum.   Recommendation:           - Await pathology results.                             - Repeat upper endoscopy after studies are complete                             for surveillance based on pathology results.                                                                                     Esophageal biopsy, distal: 10/28/2020  FINAL DIAGNOSIS:   Esophagus, distal, biopsies:   - Fragments of squamous and columnar mucosa.   - Intestinal metaplasia (goblet cells present); Patel's esophagus.   - Negative for dysplasia.       Anti-obesity medication ROS:    HEENT  Hx of glaucoma: No    Cardiovascular  CAD:No  HTN:Yes    Gastrointestinal  GERD:Yes  Constipation:No  Liver Dz:No  H/O Pancreatitis:No    Psychiatric  Bipolar: No  Anxiety:No  Depression:Yes  History of alcohol/drug abuse: No  Hx of eating disorder:No    Endocrine  Personal or family hx of MTC or MEN2:No  Diabetes/prediabetes: No    Neurologic:  Hx of seizures: No  Hx of migraines: No  Memory Impairment: No      History of kidney stones: No  Kidney disease: No  Current birth control: N/A        Objective     /83 (BP Location: Left arm, Patient Position: Sitting, Cuff Size: Adult Large)   Pulse 89   Ht 1.829 m (6')   Wt (!) 184.9 kg (407 lb 9.6 oz)   SpO2 99%   BMI 55.28 kg/m    Body mass index is 55.28 kg/m .  Physical Exam   GENERAL: Healthy, alert and no distress  EYES: Eyes grossly normal to inspection.  No discharge or erythema, or obvious scleral/conjunctival abnormalities.  RESP: No audible wheeze, cough, or visible cyanosis.  No visible retractions or increased work of breathing.    SKIN: Visible skin clear. No significant rash, abnormal pigmentation  or lesions.  NEURO: Cranial nerves grossly intact.  Mentation and speech appropriate for age.  PSYCH: Mentation appears normal, affect normal/bright, judgement and insight intact, normal speech and appearance well-groomed.        In summary, Kimo Greenwood has Class III obesity with a body mass index of Body mass index is 55.28 kg/m . kg/m2 and the comorbidities stated above. He completed an informational seminar and is a possible candidate for the laparoscopic gastric sleeve.  He will have to complete the following pre-requisites:    Received weight loss goal of 40 lb prior to surgery.  Achieve clearance from dietitian to see surgeon.  Have preoperative laboratory tests drawn.  Psychological Evaluation with MMPI and clearance for weight loss surgery.  Letter of clearance from the following PCP, Psychiatrist, Therapist, Sleep.    Today in the office we discussed gastric sleeve surgery. Preoperative, perioperative, and postoperative processes, management, and follow up were addressed.  Risks and benefits were outlined including the risk of death, staple line leak (1-2%), PE, DVT, ulcer, worsening GERD, N/V, stricture, hernia, wound infection, weight regain, and vitamin deficiencies. I emphasized exercise and activity along with appropriate food choice as the main foundation for weight loss with surgery providing surgical reinforcement of this.  All questions were answered.  A goal sheet and support group handout were given to the patient.      EGD    If you have not already watched our online seminar please go to [email or web address]    Weight loss requirement: 40lbs prior to surgery. Will have final weight check 2-3 weeks prior to surgery at anesthesia or nurse pre-op teaching visit.    -Need current weight confirmation at primary clinic or weight management clinic No    Bariatric labs ordered, call for a lab only appointment at any [facility] lab. To find a lab location near you, please call  [phone]. Please let us know if orders need to be faxed to a [facility] lab.    Schedule bariatric psych eval as soon as possible.  List of psychologists will be sent to you via [employer] or given to you in clinic.     Call Aaron Irvin at [phone] to discuss insurance coverage for bariatric surgery.  Please check with your insurance regarding bariatric surgery coverage also. Aaron can also help you with scheduling psych eval if you are having difficulties.    The following clearance letters are needed: Letter templates will be sent to you via [employer] or given to you in clinic. Providers can submit through electronic medical record or fax to [phone].  - Letter of support from primary care provider.   - Psychiatrist   - Therapist   - Sleep     Smoking cessation and nicotine test needed: Yes    Birth control after surgery discussed. Patient instructed that 2 forms of birth control required after surgery and to avoid pregnancy for at least 18 months after surgery: N/A    NEXT VISITS: A  should reach out to you to schedule the following appointments.  If they do not reach you please call [phone] to schedule the following appointments:    -See dietitian in 1 month and monthly for 3 months    -See Lauren Bloch [place] pharmacist in 1 month to follow up on weight loss medications and nicotine cessation     -See Nya Camarena in 2 months to follow up on pre-op weight loss and weight loss medications    -See Dr Madrigal in 1 month for bariatric surgeon visit. Discuss bariatric surgery.  Important items to discuss history of Barretts esophagus       Once the patient has completed the requirements in their task list and there are no further recommendations, the pt will be allowed to see the surgeon of their choice for consultation on the laparoscopic gastric sleeve surgery. Patient verbalizes understanding of the process to surgery and expectations for the postoperative period including the need for  lifelong lifestyle changes, vitamin supplementation, and laboratory monitoring.    Sincerely,     MONA ONOFRE PA-C

## 2023-01-09 NOTE — Clinical Note
MICHELLE, skip, Dr. Madrigal. Task list started.   Natacha - can you send everything via [employer] even though was in clinic. Thank you!

## 2023-01-09 NOTE — LETTER
Dear Psychiatrist/Psychologist:    Re: Kimo Greenwood  : 1980    Thank you for taking the time to see our patient for a preoperative psychological evaluation for bariatric surgery. Elements that we use in screening patients for bariatric surgery include:    1. Intake Assessment  Identifying data: reason for pursuing surgery.  History of Present Illness: weight loss history, typical eating patterns, exercise and leisure time activities, coping skills.  Personal barriers to making and continuing required life changes have been identified, and strategies to overcome those barriers have been recommended. (BCBS criteria 10/31/22)  Psychiatric History:If a mental health condition is present, is it under successful treatment.  (BCBS criteria, 10/31/22)  Medical History: allergies, emergencies, head traumas, etc.  Family History  Social and Development History: abuse/neglect, level of functioning, education, employment.  Substance Abuse and Addictions: prescription and OTC meds/herbs, alcohol and related substances, nicotine, caffeine/pop, gambling, shopping. Absence of active substance use disorder.(BCBS criteria 10/31/22)  Mental Status: appearance, behavior, mood and affect, thought content and thought process. Is patient able to provide an informed consent. (BCBS, 10/31/22)  Social Network: marriage, children, friends, current living situation. Family and social supports have been assessed, and strategies to strengthen those supports have been recommended. ([employer]BS, 10/31/22)  Legal History  2. MMPI  3. Summary of current findings: emotional stability, conclusions and recommendations.  4. Follow-up visit to discuss the results of the MMPI or other tests, conclusions and recommendations.    The evaluation must confirm that the patient is emotionally stable to proceed with the surgery, cognitively capable of understanding the risks and realistic goals of the surgical procedure, and prepared to comply with the  long-term aftercare and behavioral changes expected after surgery.     We feel it is important for our patients to establish a relationship with a mental health provider prior to surgery since the changes they experience postoperatively can be overwhelming. Therefore, having an established relationship is essential to help them cope effectively with these life-altering changes.  Call our pre-surgery nurse coordinator, Elle Butler RN at [phone] to leave a message if you have any questions or concerns.  Please fax the evaluation to [phone] or route to Elle Butler via Epic.    Sincerely,   MD Blayne Pollock MD Eric Wise, MD Kristi Kopacz, ELENI Aguila, TERRY Lara PA-C    Mailing Address:  [facility]  [address] [place] [address], [place] [address], [place] [address]

## 2023-01-09 NOTE — LETTER
Dear Psychiatrist/Psychologist/Therapist:    Re: Kimo ABE Timo  : 1980    We have a mutual patient with morbid obesity who is considering undergoing bariatric surgery.  A psychological evaluation is being done to see if this patient is cleared from a psychological perspective to undergo the elective surgery.  However, we need your assistance with a letter of support as outlined below. Your input is valuable and will affect our decision to hold or proceed with bariatric surgery.    This letter of support should outline:  1. Summary of treatment to date.  2. Treatment goals for before and after surgery that indicate mental health support and continuation of care.  3. Any concerns regarding this patient s ability to follow through with treatment recommendations.    4. If known, documentation of cessation of illicit drug use (our policy requires patients to be drug free of illicit drugs for at least one year prior to surgery).   5.        A statement that indicates if you support or do not support this patient for weight loss surgery.    If you have any questions, feel free to contact us via our Call Center at [phone].  Please fax the evaluation to [phone] or route to Elle Butler RN via Epic.    Sincerely,    MD Blayne Pollock MD Eric Wise, MD Kristi Kopacz, TERRY Peck PA-C    Mailing Address:  [facility]  [address] [place] [address], [facility] [address], [place] [address]

## 2023-01-09 NOTE — LETTER
Dear Urologist:    Re: Kimo Greenwood  : 1980    Thank you for taking the time to see our mutual patient for a pre-operative urology clearance evaluation for bariatric surgery.  Please assist us with the following during your evaluation of our mutual patient:      Initial patient evaluation    Arrange and expedite necessary testing    Guide and facilitate follow-up treatment    Provide a letter stating that the patient is cleared to proceed with bariatric surgery from a urology standpoint    Indicate what treatment is needed pre-surgery (if any), during inpatient hospitalization and after surgery    The evaluation must confirm that the patient is stable from a urology standpoint to proceed with the surgery. If you have any questions, feel free to contact us via our Call Center at [phone].  Please fax the evaluation to [phone] or route to Elle Butler RN via Epic.    Sincerely,    MD Blayne Pollock MD Eric Wise, MD Kristi Kopacz, PA-C Kayla Gish, TERRY Lara PA-C    Mailing Address:  [facility]  [address] [place] [address], [place] [address], [place] [address]

## 2023-01-09 NOTE — PATIENT INSTRUCTIONS
"Thank you for allowing us the privilege of caring for you. We hope we provided you with the excellent service you deserve.   Please let us know if there is anything else we can do for you so that we can be sure you are completely satisfied with your care experience.    To ensure the quality of our services you may be receiving a patient satisfaction survey from an independent patient satisfaction monitoring company.    The greatest compliment you can give is a \"Likely to Recommend\"    Your visit was with MONA ONOFRE PA-C today.    Instructions per today's visit:     Robert Greenwood, it was great to visit with you today.  Here is a review of our visit.  If our clinic scheduler is not able to reach you please call [phone] to schedule your next appointments.    EGD - Reach out to Dr. You Arzola to have EGD completed prior to surgery. If not available, can be completed by Dr. Madrigal     Start Wegovy 1.0mg once weekly. If approved, stop Victoza the day before starting Wegovy.     If you have not already watched our online seminar please go to [email or web address]    Weight loss requirement: 40lbs prior to surgery. Will have final weight check 2-3 weeks prior to surgery at anesthesia or nurse pre-op teaching visit.    -Need current weight confirmation at primary clinic or weight management clinic No    Bariatric labs ordered, call for a lab only appointment at any [facility] lab. To find a lab location near you, please call [phone]. Please let us know if orders need to be faxed to a non [facility] lab.    Schedule bariatric psych eval as soon as possible.  List of psychologists will be sent to you via [email or web address] or given to you in clinic.     Call Aaron Irvin at [phone] to discuss insurance coverage for bariatric surgery.  Please check with your insurance regarding bariatric surgery coverage also. Aaron can also help you with scheduling psych eval if you are having " difficulties.    The following clearance letters are needed: Letter templates will be sent to you via [employer] or given to you in clinic. Providers can submit through electronic medical record or fax to [phone].  - Letter of support from primary care provider.   - Psychiatrist   - Therapist     Smoking cessation and nicotine test needed: Yes    NEXT VISITS: A  should reach out to you to schedule the following appointments.  If they do not reach you please call [phone] to schedule the following appointments:    -See dietitian in 1 month and monthly for 3 months    -See Lauren Bloch MTM pharmacist in 1 month to follow up on weight loss medications    -See Nya Camarena in 2 months to follow up on pre-op weight loss and weight loss medications    -See Dr Madrigal in 1 month for bariatric surgeon visit. Discuss bariatric surgery.  Important items to discuss history of Barretts esophagus       Information about Video Visits with [facility]: video visit information  _________________________________________________________________________________________________________________________________________________________  If you are asked by your clinic team to have your blood pressure checked:  [facility] do offer several locations for blood pressure checks. Please follow the below link to schedule an appointment. Scheduling an appointment at the pharmacy for a blood pressure check is now preferred.    Appointment Plus ([email or web address])  _________________________________________________________________________________________________________________________________________________________  Important contact and scheduling information:  Please call our contact center at [phone] to schedule your next appointments.  To find a lab location near you, please call [phone].  For any nursing questions or concerns call Niurka Haque LPN at [phone] or Denise Hubbard RN at  [phone]  Please call during clinic hours Monday through Friday 8:00a - 4:00p if you have questions or you can contact us via [employer] at anytime and we will reply during clinic hours.    Lab results will be communicated through My Chart or letter (if My Chart not used). Please call the clinic if you have not received communication after 1 week or if you have any questions.?  Clinic Fax: [phone]    _________________________________________________________________________________________________________________________________________________________  Meal Replacement Products:    Here is the link to our new e-store where you can purchase our meal replacement products    [employer] E-Store  Spear[employer]h/store    The one week starter kit is a great way to sample a variety of products and see what works for you.    If you want more information about the product go to: Seedpost [employer] [email or web address].Artimi    If you are an employee or [facility] or [employer] please contact your care team for a 10% estore discount    Free Shipping for orders over $75     Benefits of meal replacements products:    Portion and calorie control  Improved nutrition  Structured eating  Simplified food choices  Avoid contact with trigger foods  _________________________________________________________________________________________________________________________________________________________  Interested in working with a health ?  Health coaches work with you to improve your overall health and wellbeing.  They look at the whole person, and may involve discussion of different areas of life, including, but not limited to the four pillars of health (sleep, exercise, nutrition, and stress management). Discuss with your care team if you would like to start working a health .  Health Coaching-3 Pack: Schedule by calling [phone]    $99 for three health coaching visits    Visits may  be done in person or via phone    Coaching is a partnership between the  and the client; Coaches do not prescribe or diagnose    Coaching helps inspire the client to reach his/her personal goals   _________________________________________________________________________________________________________________________________________________________  24 Week Healthy Lifestyle Plan:    Our mission in the 24-week Healthy Lifestyle Plan is to provide you with individualized care by giving you the tools, education and support you need to lose weight and maintain a healthy lifestyle. In your 24-week journey, you ll be supported by a dedicated weight loss team that includes registered dietitians, medical weight management providers, health coaches, and nurses -- all with special expertise in weight loss -- to help you every step of the way.     Monthly meetings with your registered dietician or medical weight management provider help to review your progress, update your care plan, and make any adjustments needed to ensure success. Between these visits, weekly and bi-weekly health  visits will help you focus on the four pillars of weight loss -- stress, sleep, nutrition, and exercise -- and how you can best adapt each to achieve sustainable weight loss results.    In addition, you will be given exclusive access to online wellbeing classes through [employer].  Your initial visit will be with a medical weight management provider who will help to understand your weight loss goals and ensure this program is the right fit for you. Please let our team know if you are interested in the 24 week plan by sending a message to your care team or calling [phone] to schedule.  _________________________________________________________________________________________________________________________________________________________    COMPREHENSIVE WEIGHT MANAGEMENT PROGRAM  VIRTUAL SUPPORT GROUPS    For Support Group  "Information:      We offer support groups for patients who are working on weight loss and considering, preparing for or have had weight loss surgery.   There is no cost for this opportunity.  You are invited to attend the?Virtual Support Groups?provided by any of the following locations:    [facility] via Microsoft Teams with Gypsy Fermin RN  2.   [place] via NeedFeed with Rafat Ayoub, PhD, LP  3.   [place] via NeedFeed with Jacklyn Jones RN  4.   [facility] via cookdinner Teams with Jacklyn Benedict [facility]    The following Support Group information can also be found on our website:  [email or web address]    [facility] Weight Loss Surgery Support Group    [facility] Weight Loss Surgery Support Group  The support group is a patient-lead forum that meets monthly to share experiences, encouragement and education. It is open to those who have had weight loss surgery, are scheduled for surgery, and those who are considering surgery.   WHEN: This group meets on the 3rd Wednesday of each month from 5:00PM - 6:00PM virtually using Microsoft Teams.   FACILITATOR: Led by Gypsy Fermin RD, LD, RN, the program's Clinical Coordinator.   TO REGISTER: Please contact the clinic via [employer] or call the nurse line directly at [phone] to inform our staff that you would like an invite sent to you and to let us know the email you would like the invite sent to. Prior to the meeting, a link with directions on how to join the meeting will be sent to you.    2022 Meetings  Susan 15: \"Let's Talk\" a time for the group to share.  July 20: \"Let's Talk\" a time for the group to share.  August 17: \"Let's Talk\" a time for the group to share.  September 21: \"Let's Talk\" a time for the group to share.  October 19: Guest Speaker: Dr Don Munguia MD Pulmonologist and Sleep Medicine Physician, \"Getting a Good Night's Sleep\".  November 16: \"Let's " "Talk\" a time for the group to share.  December 21: \"Let's Talk\" a time for the group to share.    [facility] Support Groups    Connections: Bariatric Care Support Group?  This is open to all [facility] (and those external to this program) pre- and post- operative bariatric surgery patients as well as their support system.   WHEN: This group meets the 2nd Tuesday of each month from 6:30 PM - 8:00 PM virtually using Microsoft Teams.   FACILITATOR: Led by Rafat Ayoub, Ph.D who is a Licensed Psychologist with the [facility] Comprehensive Weight Management Program.   TO REGISTER: Please send an email to Rafat Ayoub, Ph.D., LP at?[email or web address]?if you would like an invitation to the group and to learn about using Microsoft Teams.    2022 Meetings  June 14: Nicol Mari RD, LD at [facility], \"Nutritional Labeling\"  July 12 August 2 (Please Note Date Change)  September 13 October 11 November 8 December 13    Connections: Post-Operative Bariatric Surgery Support Group  This is a support group for [facility] bariatric patients (and those external to [facility]) who have had bariatric surgery and are at least 3 months post-surgery.  WHEN: This support group meets the 4th Wednesday of the month from 11:00 AM - 12:00 PM virtually using Microsoft Teams.   FACILITATOR: Led by Certified Bariatric Nurse, Jacklyn Jones RN.   TO REGISTER: Please send an email to Jacklyn at [email or web address] if you would like an invitation to the group and to learn about using Microsoft Teams.    2022 Meetings June 22 July 27 August 24 September 28 October 26 November 23 December 28      [facility] Healthy Lifestyle Virtual Support Group    Healthy Lifestyle Virtual Support Group?  This is 60 minutes of small group guided discussion, support and resources. All are welcome who want a healthy " "lifestyle.  WHEN: This group meets monthly on a Friday from 12:30 PM - 1:30 PM virtually using Microsoft Teams.   FACILITATOR: Led by National Board Certified Health and , Jacklyn Benedict [facility].   TO REGISTER: Please send an email to Jacklyn [email or web address] to receive monthly invites to the group or if you have any questions about having a health .  Prior to the meeting, a link with directions on how to join the meeting will be sent to you.    2022 Meetings  June 24: [facility] Benedict [facility], \"Setting Limits and Boundaries\".  Jul 29: Open Forum  August 26: Guest Speaker: Krys Wells Registered Dietitian  September 30: Open Forum  October 28th: Guest Speaker: Gaby Kern [facility], Health , \"Gratitude Practices\".  November 18: Guest Speaker: Aydee Bar RD Registered Dietitian, \"Navigating How to Eat around the Holidays\".  December 16: Guest Speaker: Shannon Mendoza [facility], \"Changing Your Relationship with Movement\".    ____________________________________________________________________________________________________________________________________________________________________________  [place] [facility] Get Moving Program  Our team of physical therapists is trained to help you understand and take control of your condition. They will perform a thorough evaluation to determine your ability for activity and develop a customized plan to fit your goals and physical ability.  Scheduling: Unsure if the Get Moving program is right for you? Discuss the program with your medical provider or diabetes educator. You can also call us at [phone] to ask questions or schedule an appointment.   [facility] Get Moving Program  ____________________________________________________________________________________________________________________________________________________________________________  [facility] Diabetes Prevention Program (DPP)  If you have prediabetes and Medicare " please contact us via [facility] [employer] to learn more about the Diabetes Prevention Program (DPP)  Program Details:  [facility] offers the year-long Diabetes Prevention Program (DPP). The program helps you to make lifestyle changes that prevent or delay type 2 diabetes by supporting healthy eating, increased physical activity, stress reduction and use of coping skills.   On average, previous [facility] DPP cohorts have lost and maintained at least 5% of their starting weight throughout the program and averaged more than 150 minutes of physical activity per week.  Participants meet weekly for one-hour group sessions over sixteen weeks, every other week for the next 8 weeks, and monthly for the last six months.   A year-long maintenance program is also available for participants who complete the first year.   Location & Cost:   During the COVID-19 Public Health Emergency, the program is offered virtually. When in-person classes can resume, they will be held at [facility].  For people with Medicare, the program is covered in full. A self-pay option will also be available for those with non-Medicare insurance plans.   _________________________________________________________________________________________________________________________________________________________  Bluetooth Scale:    We hope to provide you with high quality virtual healthcare visits while social distancing for COVID-19 is necessary, as well as in the future when virtual visits may be more convenient for you.     Our technology team made it possible for Bluetooth scales to send weight measurements to our electronic medical record. This allows weights from you weighing at home to securely flow into the medical record, which will improve telephone and virtual visits.   Additionally, studies have shown that adults actually lose more weight when their weights are automatically sent to someone else, and also that  this process is not stressful for those adults.    Below is a link for purchasing the scale, with a discount code for our patients. You may call your insurance company to see if they will reimburse you for the cost of the scale, as a piece of durable medical equipment. The scales only go up to a weight of 400 pounds. This is an issue and we are working with the developer on increasing this. We found no scales that go over 400lb that have blue-tooth for connecting to [employer].    Scale to purchase: the [employer]Us  Body  Scale: [email or web address]=aw.ds    Discount Code: We have a discount code for our patients to bring the cost down to $50, Discount code is: [place]_Scale_20%off  _______________________________________________________________________________________________________________________________________________________________________________    To work with a Behavioral Health Psychologist:    Call to schedule:    Brandon Lino - [phone]  Jerrell Alfredo - [phone]  Chen Dawkins - [phone]  Dania Martino - [phone]   Tasha Rucker PhD (cannot accept Medicare) [phone]        Thank you,   [facility] Comprehensive Weight Management Team    WEGOVY (semaglutide)    What is Wegovy?    Wegovy (semaglutide) injection 2.4 mg is an injectable prescription medicine used for adults with obesity (BMI ?30) or overweight (excess weight) (BMI ?27) who also have weight-related medical problems to help them lose weight and keep the weight off.    1.  Start Wegovy (semaglutide) 0.25 mg once weekly for 4 weeks, then if tolerating increase to 0.5 mg weekly for 4 weeks, then if tolerating increase to 1 mg weekly for 4 weeks, then if tolerating increase to 1.7 mg weekly for 4 weeks, then if tolerating increase to 2.4 mg weekly thereafter.    -Each Wegovy pen is a once weekly single-dose prefilled pen with a  pen injector already built within the pen. Discard the Wegovy pen after use in sharps container.     2. Storage: make sure that when you get the prescription that you store the prescription in the refrigerator until it is time to use the Wegovy pen.  Once it is time to use the Wegovy pen, you can keep the pen at room temperature and it is good for up to 28 days at room temperature.     3.  Potential common side effects: nausea, headache, diarrhea, stomach upset.  If these become unmanageable or concerning symptoms, please make sure to call or [facility].      Go to site: Wegovy video to learn more and watch instruction videos.      For any questions or concerns please send a WhoJam message to our team or call our weight management call center at [phone] during regular business hours. For questions during evenings or weekends your messages will be addressed during the next business day.  For emergencies please call 911 or seek immediate medical care.

## 2023-01-09 NOTE — PROGRESS NOTES
"90 minutes spent on the date of the encounter doing chart review, history and exam, documentation and further activities per the note    New Bariatric Surgery Consultation Note    2023    RE: Kimo Greenwood  MR#: [id number]  : 1980      Referring provider:       2023   Who referred you?        Chief Complaint/Reason for visit: evaluation for possible weight loss surgery    Dear Ciro Love MD (General),    I had the pleasure of seeing your patient, Kimo Greenwood, to evaluate his obesity and consider him for possible weight loss surgery. As you know, Kimo Greenwood is 42 year old.  He has a height of 6' 0\", a weight of 407 lbs 9.6 oz, and calculated Body mass index is 55.28 kg/m .    Assessment & Plan   Problem List Items Addressed This Visit        Respiratory    LOREN (obstructive sleep apnea)    Relevant Orders    Adult Sleep Eval & Management  Referral       Digestive    Class 3 severe obesity with serious comorbidity and body mass index (BMI) of 50.0 to 59.9 in adult (H) - Primary     Obesity onset in early 20s. Weight gain gradual. Was previously able to lose weight with the help of Saxenda. In  was 350lbs, but Saxenda seems to not be working as well and weight has continued to increase. Needs to lose weight to qualify for total hip replacement - goal of <330lbs.     History includes - LOREN, GERD, astham, garcia's esophagus, LBP, HTN, overactive bladder, and depression. Willard has an intellectual delay, but I have no concerns during my visit today. He was able to articulate his thoughts well and asked questions about surgery. However, there seems to be some disconnect on what and how much he is eating. Group home staff stated he is eating much more then Willard states.     Discussed AOMs today:   - Phentermine - previously tried, but no effect on weight. No side effects   - Topiramate - previously tired, but no effect on weight. No side effects   - " Naltrexone - can consider in the future   - GLP-1 - he is currently taking Victoza 3.0mg, but is no longer helping with hunger. Tolerating well, no side effects Will transition to Wegovy 1.0mg today due to not being on Victoza for the past 15 weeks. Can consider Ozempic if needed.            Relevant Medications    Semaglutide-Weight Management (WEGOVY) 1 MG/0.5ML SOAJ    Other Relevant Orders    CBC with platelets (Completed)    Comprehensive metabolic panel (Completed)    Hemoglobin A1c (Completed)    Lipid panel reflex to direct LDL Fasting (Completed)    Parathyroid Hormone Intact (Completed)    Vitamin D Deficiency (Completed)    Med Therapy Management Referral    Patel's esophagus determined by biopsy     GERD symptoms well controlled with PPI. Last EGD was in 2020. Will need to complete EGD prior to surgery. Can be done by Dr. Arzola or Dr. Madrigal. Patient to reach out if would like an order placed.             Behavioral    Tobacco use disorder     Patient is aware that smoking cessation is needed 3 months prior to surgery. Currently vapes nicotine daily. Is unsure if ready to quit. Will have further discussion with [facility] pharmacy.          Relevant Medications    Semaglutide-Weight Management (WEGOVY) 1 MG/0.5ML SOAJ      1. Bariatric pre-op labs ordered  2. Start Wegovy 1.0mg once weekly.   3. EGD needed prior to surgery - can be done by Dr. You Arzola who has previously completed  4. Schedule Psych eval.   5. LOS/clearance need:   - PCP  - Therapist  - Psychiatrist   - Sleep - referral placed  6. Smoking cessation needed - discuss with [facility] Pharmacist   7. Follow up with [facility] pharmacy in 1 month for GLP-1 and smoking cessation   8. Dietitian visit needed 3x, and then montly until surgery   9. Dr. Madrigal visit in 1 month   10. Follow up with Nya Camarena in 2 months     HISTORY OF PRESENT ILLNESS:  Weight Loss History Reviewed with Patient 1/9/2023   How long have you been overweight? Since early  childhood   What is the most that you have ever weighed? 397.5   I have tried the following weight loss medications? (Check all that apply) None   Have you ever had weight loss surgery? No     Obesity in early 20s. Weight gain has been gradual in the past. Has gained 50lbs in the past few months due to decrease in activity and diet. Was 350lbs in fall of 2022. Needs to lose weight in order to get a hip replacement goal weight of 330lbs. Was on Saxenda and Topiramate for one month, but topiramate was discontinued due to be ineffective. Mother says the weight is causing him to be home bound due hip pain.     Weight today - 407lbs    Eating 2 meals a day, with snacks. Trying to decrease pop. Meals are provided by group home. However, will go to the store to get candy, chip, cookies, crackers, ice cream, pudding, and soda. Has increased hunger. Struggles with getting full.   Breakfast - cereal with milk   Lunch - provided by group home  Dinner - pizza   Snacks - cookies, animal crackers   Pop - 1 liter of regular pop at a day    Activity - Hip pain limits activity. Will try to get up and walk around as much as possible. Cannot walk more then 50 yards at a time.     AOMs:  Saxenda 3.0mg - has not taken it for 1.5 weeks. No side effects. Was helping with weightloss, but seems to be less effective over the past 4 months.   Topiramate - no effect on weight   Phentermine - no effect on weight       CO-MORBIDITIES OF OBESITY INCLUDE:     1/9/2023   I have the following health issues associated with obesity: None of the above     Hip OA - needing hip replacement. Goal weight for surgery is 330lbs.     LOREN - uses CPAP every night. Followed by PCP     Barretts esophagus - has not had any GERD symptoms recently. Well controlled on PPI. Last EGD was in 2020 by Dr. You Kraus.     HTN - well controlled on medications. Followed by PCP     Degenerative disk disease - causes low back pain. Limits activity     Over active bladder -  has interstem. Followed by urology     History of bleeding or clotting disorder? No    Is patient on biologics or immunomodulators? No    PAST MEDICAL HISTORY:  Past Medical History:   Diagnosis Date     Arthritis     DDD hips and knees     Asthma      Asthma      Patel's esophagus      Chronic pain      Chronic pain - left hip/leg pain     degenerative disc disease, back, hips, knees     Gastroesophageal reflux disease      History of anesthesia complications     agitation x1 after interstim 2013     Hypertension      Hypertension      Leukocytosis      LPRD (laryngopharyngeal reflux disease)      Marijuana abuse      Marijuana abuse      MDD (major depressive disorder)      MDD (major depressive disorder)      Mental retardation, mild (I.Q. 50-70)      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    With associated speech/language delay     Obesity 11/11/2010     LOREN (obstructive sleep apnea)     Uses a CPAP     LOREN (obstructive sleep apnea)      Seborrheic dermatitis      Seborrheic dermatitis      Sleep apnea     CPAP       PAST SURGICAL HISTORY:  Past Surgical History:   Procedure Laterality Date     ARTHROPLASTY HIP Left 1/25/2017    Procedure: ARTHROPLASTY HIP;  Surgeon: Bala Randall MD;  Location:  OR     ARTHROPLASTY HIP Left      ARTHROPLASTY REVISION HIP Left 6/24/2019    Procedure: Revision left total hip arthroplasty with a head and liner exchange to a Biomet dual mobility head and liner.;  Surgeon: Bala Randall MD;  Location:  OR     BLADDER SURGERY      Alida Ibarra,Interstem stimulator implant     CLOSED REDUCTION HIP Left 6/10/2019    Procedure: Closed reduction under general anesthesia left recurrent prosthetic hip dislocation;  Surgeon: Rafat Cazares MD;  Location:  OR     COLONOSCOPY N/A 10/30/2015    Procedure: COMBINED COLONOSCOPY, SINGLE OR MULTIPLE BIOPSY/POLYPECTOMY BY BIOPSY;  Surgeon: Alexis Jackson MD;  Location:  GI     COLONOSCOPY  N/A 11/17/2016    Procedure: COMBINED COLONOSCOPY, SINGLE OR MULTIPLE BIOPSY/POLYPECTOMY BY BIOPSY;  Surgeon: Cat Huber MD;  Location: [facility] GI     COLONOSCOPY N/A 10/28/2020    Procedure: COLONOSCOPY;  Surgeon: You Kraus MD;  Location: [facility] OR     COLONOSCOPY       ESOPHAGOSCOPY, GASTROSCOPY, DUODENOSCOPY (EGD), COMBINED N/A 11/17/2016    Procedure: COMBINED ESOPHAGOSCOPY, GASTROSCOPY, DUODENOSCOPY (EGD), BIOPSY SINGLE OR MULTIPLE;  Surgeon: Cat Huber MD;  Location: [facility] GI     ESOPHAGOSCOPY, GASTROSCOPY, DUODENOSCOPY (EGD), COMBINED N/A 3/12/2020    Procedure: ESOPHAGOGASTRODUODENOSCOPY WITH BIOPSIES;  Surgeon: You Kraus MD;  Location: [facility] OR     ESOPHAGOSCOPY, GASTROSCOPY, DUODENOSCOPY (EGD), COMBINED N/A 10/28/2020    Procedure: ESOPHAGOGASTRODUODENOSCOPY, WITH BIOPSY;  Surgeon: You Kraus MD;  Location: [facility] OR     ESOPHAGOSCOPY, GASTROSCOPY, DUODENOSCOPY (EGD), COMBINED       EXAM UNDER ANESTHESIA, FULGURATE CONDYLOMA ANUS, COMBINED N/A 12/22/2016    Procedure: COMBINED EXAM UNDER ANESTHESIA, FULGURATE CONDYLOMA ANUS;  Surgeon: Nya Cabello MD;  Location: [facility] OR     GENITOURINARY SURGERY       JOINT REPLACEMENT Left 2017    MARGO, Revision Left MARGO     OTHER SURGICAL HISTORY      interstim stimulator implant     MT CYSTOURETHROSCOPY INJ CHEMODENERVATION BLADDER N/A 1/16/2019    Procedure: CYSTOSCOPY BOTOX BLADDER INJECTIONS (100 UNITS IN 10CC);  Surgeon: Didier Ibarra MD;  Location: [facility] OR;  Service: Urology     MT IMPLANT PERIPH/GASTRIC NEUROSTIM/ N/A 1/8/2020    Procedure: NEW INTERSTIM LEAD, REPLACE OLD; NEW INTERSTIM BATTERY, REPLACE OLD;  Surgeon: Didier Ibarra MD;  Location: [facility] OR;  Service: Urology       FAMILY HISTORY:   Family History   Problem Relation Age of Onset     Anxiety Disorder Mother      Depression Mother      Ulcerative Colitis Mother 18     Breast Cancer Maternal Grandmother       Cerebrovascular Disease Maternal Grandmother      Breast Cancer Maternal Aunt      Cancer Maternal Grandfather         grt uncles colon cancer       SOCIAL HISTORY:   Social History Questions Reviewed With Patient 1/9/2023   Which best describes your employment status (select all that apply) I am disabled   Which best describes your marital status: single   Do you have children? No   Who do you have in your support network that can be available to help you for the first 2 weeks after surgery? agency   Who can you count on for support throughout your weight loss surgery journey? fully supportive   Disabled, lives at [facility]. Very supportive family     HABITS:     1/9/2023   How often do you drink alcohol? Never   Do you currently use any of the following Nicotine products? e-cigarettes   Have you ever used any of the following nicotine products? E-cigarettes   If you previously used any of these products, what year did you quit? 2023   Have you or are you currently using street drugs or prescription strength medication for which you do not have a prescription for? No   Do you have a history of chemical dependency (alcohol or drug abuse)? No   ETOH - rarely   Vapes nicotine - 4-5x a day. Has never quit nicotine. Feels like he might be ready. Will discuss with [facility] pharmacy.   No THC      Currently taking narcotic/opioids No    PSYCHOLOGICAL HISTORY:   Psychological History Reviewed With Patient 1/9/2023   Have you ever attempted suicide? More than 10 years ago.   Have you had thoughts of suicide in the past year? No   Have you ever been hospitalized for mental illness or a suicide attempt? In the last 1 to 5 years.   Do you have a history of chronic pain? Yes   Have you ever been diagnosed with fibromyalgia? No   Are you currently being treated for any of the following? (select all that apply) Depression   Are you currently seeing a therapist or counselor?  No   Are you currently seeing a  psychiatrist? Yes   Depression - mood is well controlled on medications. Followed by therapist and psychiatrist at [facility]     ROS:     1/9/2023   Skin:  None of the above   HEENT: None of these   Musculoskeletal: None of the above   Cardiovascular: Shortness of breath with activity   Pulmonary: Shortness of breath with activity   Gastrointestinal: None of the above   Genitourinary: None of the above   Hematological: None of the above   Neurological: None of the above       EATING BEHAVIORS:     1/9/2023   Have you or anyone else thought that you had an eating disorder? No   Do you currently binge eat (eat a large amount of food in a short time)? Yes   Are you an emotional eater? Yes   Do you get up to eat after falling asleep? Yes       EXERCISE:     1/9/2023   How often do you exercise? Never   What keeps you from being more active?  Pain       MEDICATIONS:  Current Outpatient Medications   Medication Sig Dispense Refill     albuterol (VENTOLIN HFA) 108 (90 Base) MCG/ACT inhaler INHALE 2 PUFFS INTO LUNGS EVERY SIX HOURS AS NEEDED FOR SHORTNESS OF BREATH / DYSPNEA OR WHEEZING 18 g 1     calcium polycarbophil (FIBER-LAX) 625 MG tablet Take 2 tablets (1,250 mg) by mouth 2 times daily 360 tablet 0     DULoxetine (CYMBALTA) 60 MG capsule Take 120 mg by mouth daily        hydrochlorothiazide (HYDRODIURIL) 25 MG tablet TAKE 1 TABLET BY MOUTH ONCE DAILY 30 tablet 5     insulin pen needle (31G X 6 MM) 31G X 6 MM miscellaneous Use 1 pen needles daily or as directed. 100 each 0     lisinopril (ZESTRIL) 10 MG tablet TAKE 1 TABLET BY MOUTH EVERY MORNING 30 tablet 5     mirabegron (MYRBETRIQ) 50 MG 24 hr tablet Take 1 tablet by mouth daily       naproxen sodium 220 MG capsule If Tylenol is not helpful, this can be added 2 tabs twice a day with meals. (Patient taking differently: Take 440 mg by mouth 2 times daily (with meals)) 60 capsule 3     OLANZapine-samidorphan (LYBALVI) 10-10 MG TABS tablet Take 1 tablet by mouth  At Bedtime       order for DME Equipment being ordered: CPAP supplies 1 each 0     oxybutynin ER (DITROPAN XL) 15 MG 24 hr tablet Take 30 mg by mouth daily       pantoprazole (PROTONIX) 40 MG EC tablet TAKE 1 TABLET BY MOUTH ONCE DAILY 30-60 MINUTES BR FIRST MEAL OF THE DAY 30 tablet 5     prazosin (MINIPRESS) 1 MG capsule Take 3 mg by mouth At Bedtime       Semaglutide-Weight Management (WEGOVY) 1 MG/0.5ML SOAJ Inject 1 mg Subcutaneous once a week 2 mL 1     VITAMIN D3 50 MCG (2000 UT) tablet TAKE ONE TABLET BY MOUTH EVERY DAY 90 tablet 3     Lidocaine (LIDOCARE) 4 % Patch Place 1-2 patches onto the skin every 24 hours To prevent lidocaine toxicity, patient should be patch free for 12 hrs daily. 60 patch 1     liraglutide - Weight Management (SAXENDA) 18 MG/3ML pen Inject 3 mg subcutaneously daily. 15 mL 1       ALLERGIES:  Allergies   Allergen Reactions     Other [No Clinical Screening - See Comments] Other (See Comments)     Awoke from anesthesia with anger and ripping off dressing, after interstim placement, outside hospital 2013       EGD: 10/28/2020  Findings:        There were esophageal mucosal changes consistent with short-segment        Patel's esophagus present at the gastroesophageal junction. The        maximum longitudinal extent of these mucosal changes was 1 cm in length.        Mucosa was biopsied with a cold forceps for histology in a targeted        manner at the gastroesophageal junction. One specimen bottle was sent to        pathology. Estimated blood loss was minimal.        The stomach was normal.        The examined duodenum was normal.                                                                                     Impression:               - Esophageal mucosal changes consistent with                             short-segment Patel's esophagus. Biopsied.                             - Normal stomach.                             - Normal examined duodenum.   Recommendation:           -  Await pathology results.                             - Repeat upper endoscopy after studies are complete                             for surveillance based on pathology results.                                                                                     Esophageal biopsy, distal: 10/28/2020  FINAL DIAGNOSIS:   Esophagus, distal, biopsies:   - Fragments of squamous and columnar mucosa.   - Intestinal metaplasia (goblet cells present); Patel's esophagus.   - Negative for dysplasia.       Anti-obesity medication ROS:    HEENT  Hx of glaucoma: No    Cardiovascular  CAD:No  HTN:Yes    Gastrointestinal  GERD:Yes  Constipation:No  Liver Dz:No  H/O Pancreatitis:No    Psychiatric  Bipolar: No  Anxiety:No  Depression:Yes  History of alcohol/drug abuse: No  Hx of eating disorder:No    Endocrine  Personal or family hx of MTC or MEN2:No  Diabetes/prediabetes: No    Neurologic:  Hx of seizures: No  Hx of migraines: No  Memory Impairment: No      History of kidney stones: No  Kidney disease: No  Current birth control: N/A        Objective    /83 (BP Location: Left arm, Patient Position: Sitting, Cuff Size: Adult Large)   Pulse 89   Ht 1.829 m (6')   Wt (!) 184.9 kg (407 lb 9.6 oz)   SpO2 99%   BMI 55.28 kg/m    Body mass index is 55.28 kg/m .  Physical Exam   GENERAL: Healthy, alert and no distress  EYES: Eyes grossly normal to inspection.  No discharge or erythema, or obvious scleral/conjunctival abnormalities.  RESP: No audible wheeze, cough, or visible cyanosis.  No visible retractions or increased work of breathing.    SKIN: Visible skin clear. No significant rash, abnormal pigmentation or lesions.  NEURO: Cranial nerves grossly intact.  Mentation and speech appropriate for age.  PSYCH: Mentation appears normal, affect normal/bright, judgement and insight intact, normal speech and appearance well-groomed.        In summary, Kimo Greenwood has Class III obesity with a body mass index of Body mass index  is 55.28 kg/m . kg/m2 and the comorbidities stated above. He completed an informational seminar and is a possible candidate for the laparoscopic gastric sleeve.  He will have to complete the following pre-requisites:    Received weight loss goal of 40 lb prior to surgery.  Achieve clearance from dietitian to see surgeon.  Have preoperative laboratory tests drawn.  Psychological Evaluation with MMPI and clearance for weight loss surgery.  Letter of clearance from the following PCP, Psychiatrist, Therapist, Sleep.    Today in the office we discussed gastric sleeve surgery. Preoperative, perioperative, and postoperative processes, management, and follow up were addressed.  Risks and benefits were outlined including the risk of death, staple line leak (1-2%), PE, DVT, ulcer, worsening GERD, N/V, stricture, hernia, wound infection, weight regain, and vitamin deficiencies. I emphasized exercise and activity along with appropriate food choice as the main foundation for weight loss with surgery providing surgical reinforcement of this.  All questions were answered.  A goal sheet and support group handout were given to the patient.      EGD    If you have not already watched our online seminar please go to [email or web address]    Weight loss requirement: 40lbs prior to surgery. Will have final weight check 2-3 weeks prior to surgery at anesthesia or nurse pre-op teaching visit.    -Need current weight confirmation at primary clinic or weight management clinic No    Bariatric labs ordered, call for a lab only appointment at any [facility] lab. To find a lab location near you, please call [phone]. Please let us know if orders need to be faxed to a non [facility] lab.    Schedule bariatric psych eval as soon as possible.  List of psychologists will be sent to you via [employer] or given to you in clinic.     Call Aaron Irvin at [phone] to discuss insurance coverage for bariatric surgery.  Please  check with your insurance regarding bariatric surgery coverage also. Aaron can also help you with scheduling psych eval if you are having difficulties.    The following clearance letters are needed: Letter templates will be sent to you via [employer] or given to you in clinic. Providers can submit through electronic medical record or fax to [phone].  - Letter of support from primary care provider.   - Psychiatrist   - Therapist   - Sleep     Smoking cessation and nicotine test needed: Yes    Birth control after surgery discussed. Patient instructed that 2 forms of birth control required after surgery and to avoid pregnancy for at least 18 months after surgery: N/A    NEXT VISITS: A  should reach out to you to schedule the following appointments.  If they do not reach you please call [phone] to schedule the following appointments:    -See dietitian in 1 month and monthly for 3 months    -See Lauren Bloch [facility] pharmacist in 1 month to follow up on weight loss medications and nicotine cessation     -See Nya Camarena in 2 months to follow up on pre-op weight loss and weight loss medications    -See Dr Madrigal in 1 month for bariatric surgeon visit. Discuss bariatric surgery.  Important items to discuss history of Barretts esophagus       Once the patient has completed the requirements in their task list and there are no further recommendations, the pt will be allowed to see the surgeon of their choice for consultation on the laparoscopic gastric sleeve surgery. Patient verbalizes understanding of the process to surgery and expectations for the postoperative period including the need for lifelong lifestyle changes, vitamin supplementation, and laboratory monitoring.    Sincerely,     NYA ONOFRE PA-C

## 2023-01-09 NOTE — NURSING NOTE
(   Chief Complaint   Patient presents with     New Patient     New bariatric, BMI of 54.7    )    ( Weight: (!) 184.9 kg (407 lb 9.6 oz) )  ( Height: 182.9 cm (6') )  ( BMI (Calculated): 55.28 )  (   )  (   )  (   )  (   )  (   )  (   )    ( BP: 123/83 )  (   )  (   )  (   )  ( Pulse: 89 )  (   )  ( SpO2: 99 % )    (   Patient Active Problem List   Diagnosis     Speech/language delay     Tobacco use disorder     Nocturnal enuresis     Moderate major depression (H)     LOREN (obstructive sleep apnea)     Essential hypertension     Morbid obesity (H)     Leukocytosis     Suicidal ideation     Chronic low back pain     Bilateral low back pain with left-sided sciatica     History of colonic polyps     Mild intellectual disability     S/P total hip arthroplasty     Vitamin D deficiency     LPRD (laryngopharyngeal reflux disease)     Patel's esophagus determined by biopsy     Mild intermittent asthma     Somatic dysfunction of lumbar region     Somatic dysfunction of sacral region     Sacral pain     Thoracic segment dysfunction     Hip pain, left     Duodenitis     Overactive bladder     Venous stasis dermatitis of both lower extremities     Severe recurrent major depressive disorder with psychotic features (H)     Alcohol use disorder, mild, abuse     Cannabis use disorder, mild, abuse     Mood change    )  (   Current Outpatient Medications   Medication Sig Dispense Refill     albuterol (VENTOLIN HFA) 108 (90 Base) MCG/ACT inhaler INHALE 2 PUFFS INTO LUNGS EVERY SIX HOURS AS NEEDED FOR SHORTNESS OF BREATH / DYSPNEA OR WHEEZING 18 g 1     calcium polycarbophil (FIBER-LAX) 625 MG tablet Take 2 tablets (1,250 mg) by mouth 2 times daily 360 tablet 0     DULoxetine (CYMBALTA) 60 MG capsule Take 120 mg by mouth daily        hydrochlorothiazide (HYDRODIURIL) 25 MG tablet TAKE 1 TABLET BY MOUTH ONCE DAILY 30 tablet 5     insulin pen needle (31G X 6 MM) 31G X 6 MM miscellaneous Use 1 pen needles daily or as directed. 100 each  0     Lidocaine (LIDOCARE) 4 % Patch Place 1-2 patches onto the skin every 24 hours To prevent lidocaine toxicity, patient should be patch free for 12 hrs daily. 60 patch 1     liraglutide - Weight Management (SAXENDA) 18 MG/3ML pen Inject 3 mg subcutaneously daily. 15 mL 1     lisinopril (ZESTRIL) 10 MG tablet TAKE 1 TABLET BY MOUTH EVERY MORNING 30 tablet 5     mirabegron (MYRBETRIQ) 50 MG 24 hr tablet Take 1 tablet by mouth daily       naproxen sodium 220 MG capsule If Tylenol is not helpful, this can be added 2 tabs twice a day with meals. (Patient taking differently: Take 440 mg by mouth 2 times daily (with meals)) 60 capsule 3     OLANZapine-samidorphan (LYBALVI) 10-10 MG TABS tablet Take 1 tablet by mouth At Bedtime       order for DME Equipment being ordered: CPAP supplies 1 each 0     oxybutynin ER (DITROPAN XL) 15 MG 24 hr tablet Take 30 mg by mouth daily       pantoprazole (PROTONIX) 40 MG EC tablet TAKE 1 TABLET BY MOUTH ONCE DAILY 30-60 MINUTES BR FIRST MEAL OF THE DAY 30 tablet 5     prazosin (MINIPRESS) 1 MG capsule Take 3 mg by mouth At Bedtime       VITAMIN D3 50 MCG (2000 UT) tablet TAKE ONE TABLET BY MOUTH EVERY DAY 90 tablet 3    )  ( Diabetes Eval:    )    ( Pain Eval:  Worst Pain (10) )    ( Wound Eval:       )    (   History   Smoking Status     Every Day     Packs/day: 1.00     Years: 1.00     Types: Vaping Device, Cigarettes   Smokeless Tobacco     Current     Types: Chew    )    ( Signed By:  David Blank, EMT; January 9, 2023; 2:07 PM )

## 2023-01-10 ENCOUNTER — VIRTUAL VISIT (OUTPATIENT)
Dept: ENDOCRINOLOGY | Facility: CLINIC | Age: 43
End: 2023-01-10
Payer: COMMERCIAL

## 2023-01-10 ENCOUNTER — TELEPHONE (OUTPATIENT)
Dept: FAMILY MEDICINE | Facility: CLINIC | Age: 43
End: 2023-01-10

## 2023-01-10 DIAGNOSIS — E66.813 CLASS 3 SEVERE OBESITY DUE TO EXCESS CALORIES WITH SERIOUS COMORBIDITY AND BODY MASS INDEX (BMI) OF 50.0 TO 59.9 IN ADULT (H): ICD-10-CM

## 2023-01-10 DIAGNOSIS — E66.01 CLASS 3 SEVERE OBESITY DUE TO EXCESS CALORIES WITH SERIOUS COMORBIDITY AND BODY MASS INDEX (BMI) OF 50.0 TO 59.9 IN ADULT (H): ICD-10-CM

## 2023-01-10 DIAGNOSIS — Z71.3 NUTRITIONAL COUNSELING: Primary | ICD-10-CM

## 2023-01-10 LAB — DEPRECATED CALCIDIOL+CALCIFEROL SERPL-MC: 32 UG/L (ref 20–75)

## 2023-01-10 PROCEDURE — 97802 MEDICAL NUTRITION INDIV IN: CPT | Mod: GT | Performed by: DIETITIAN, REGISTERED

## 2023-01-10 NOTE — PATIENT INSTRUCTIONS
GOALS:  Consider tracking intake in Lose It or MyFitnessPal  Aim for 1-2 pop/week and replace with water/low-sugar option  Buy 1 pop at a time as opposed to a 6 pack   Aim to replace ice cream with yogurt   Aim to keep fruit on hand (strawberries as an example)  Check in with ILS worker on foods to make/have in apartment once you move in next month.     Meal idea discussed: honey mustard chicken, beans, asparagus       RESOURCES:     Plate method can be used for general guidance on balanced meals/portion sizes (see link below for picture/more information)                 Make 1/2 your plate non starchy vegetables (cauliflower, broccoli, asparagus, [place] sprouts, lettuce, carrots, for example).                5+ oz lean protein sources (salmon/skinless chicken/turkey breast/pork loin/lean cuts of beef/ or non-animal protein such as black, kidney or kuo beans, tofu/edamame/tempeh)     (Note: 3 oz = deck of cards size)                 ~1 cup carbohydrate choices such as whole grain starches or starchy vegetables or fruit. For example: quinoa, brown rice, barley, potatoes, sweet potatoes, winter squash, peas, corn, or fruit.               Choose ~0-2 added fat servings at a meal (avocados, nut butters, nuts or seeds, olive oil, vegetable oils).    The Plate Method:  https://www.cdc.gov/diabetes/images/managing/Diabetes-Manage-Eat-Well-Plate-Graphic_600px.jpg    Building a Plate  [email or web address]    Protein Sources for Weight Loss  [email or web address]     Carbohydrates  [email or web address]     Mindful Eating  [email or web address]     Summary of Volumetrics Eating Plan  [email or web address]     Seated Exercises for Arms and Legs (can be done before or after surgery)  [email or web address]    Follow up:    Wednesday, Feb 8th at 9:30 am    DHEERAJ Chinchilla, RD, LD  Clinic #: [phone]

## 2023-01-10 NOTE — TELEPHONE ENCOUNTER
Reason for Call:  Form, our goal is to have forms completed with 72 hours, however, some forms may require a visit or additional information.    Type of letter, form or note:  Reasonable Accomodations form    Who is the form from?: [employer] (if other please explain)    Where did the form come from: form was faxed in    What clinic location was the form placed at?: [facility]     Where the form was placed: [facility]    What number is listed as a contact on the form?: [phone]       Additional comments: fax back to [phone]    Call taken on 1/10/2023 at 10:07 AM by Barb Delgado MA

## 2023-01-10 NOTE — PROGRESS NOTES
"Kimo Greenwood is a 42 year old male who is being evaluated via a billable video visit.      The patient has been notified of following:     \"This video visit will be conducted via a call between you and your physician/provider. We have found that certain health care needs can be provided without the need for an in-person physical exam.  This service lets us provide the care you need with a video conversation.  If a prescription is necessary we can send it directly to your pharmacy.  If lab work is needed we can place an order for that and you can then stop by our lab to have the test done at a later time.    Video visits are billed at different rates depending on your insurance coverage.  Please reach out to your insurance provider with any questions.    If during the course of the call the physician/provider feels a video visit is not appropriate, you will not be charged for this service.\"    Patient has given verbal consent for Video visit? Yes  How would you like to obtain your AVS? MyChart  If you are dropped from the video visit, the video invite should be resent to: Text to cell phone: [phone]  Will anyone else be joining your video visit? Yes, mother. Send to [email or web address]  {If patient encounters technical issues they should call [phone]      Video-Visit Details    Type of service:  Video Visit    Video Start Time: 1:03 pm  Video End Time: 2:03 pm    Originating Location (pt. Location): Home    Distant Location (provider location):  Offsite (providers home)     Platform used for Video Visit: [employer]    During this virtual visit the patient is located in [place], patient verifies this as the location during the entirety of this visit.     New Bariatric Nutrition Consultation Note    Reason For Visit: Nutrition Assessment    Kimo Greenwood is a 42 year old presenting today for new bariatric nutrition consult.   Pt is interested in laparoscopic sleeve gastrectomy.  Will proceed with MWM at " "this time per pt/his mother.     Patient is accompanied by his mother, Belen.      This is pt's first required nutrition visits prior to surgery. Did NOT review the surgical information at this time - focusing on weight loss first.    Pt referred by JERRY Marinelli on January 10, 2023.    Coordination note:   Needs baseline labs  Needs Psych eval  Needs PCP letter of support  40 lb weight loss from initial  Surgeon meet and greet with Dr. Madrigal  Needs letter of support from therapist  Needs letter of support from psychiatrist       CO-MORBIDITIES OF OBESITY INCLUDE:       1/9/2023   I have the following health issues associated with obesity: None of the above     PMH:     Per PA note:  \"Hip OA - needing hip replacement.      LOREN - uses CPAP every night. Followed by PCP      Barretts esophagus - has not had any GERD symptoms recently. Well controlled on PPI.      HTN - well controlled on medications      Degenerative disk disease - causes low back pain. 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 Followed by urology\"    Depression - has therapist/psych, hx of suicide attempt per questionnaire     Speech/Language delay    SUPPORT:  Support System Reviewed With Patient 1/9/2023   Who do you have in your support network that can be available to help you for the first 2 weeks after surgery? agency   Who can you count on for support throughout your weight loss surgery journey? fully supportive       ANTHROPOMETRICS:  Consult weight 1/9/23: 407 lbs with BMI 55.28    Estimated body mass index is 55.28 kg/m  as calculated from the following:    Height as of 1/9/23: 1.829 m (6').    Weight as of 1/9/23: 184.9 kg (407 lb 9.6 oz).    Goal weight for hip replacement per Nya Camarena's note: 330 lbs    No flowsheet data found.    Weight Loss Questions Reviewed With Patient 1/9/2023   How long have you been overweight? Since early childhood     MEDICATIONS FOR WEIGHT LOSS:  Wegovy prescribed 1/9/22    Hx   Saxenda " - denied at first but was able to get with appeal  Topiramate (ineffective)    SUPPLEMENT INFORMATION:  Did not discuss today    Labs 1/9/23  Vit D 32  PTH WNL  Lipids WNL aside from elevated LDL  A1C 5.6   WBC off - WBC high, RBC low, Hgb low, Hematocrit low    Hx of Vit D def    NUTRITION HISTORY:  Pt present today with his mom and legal guardian, Belen.     Patient is considering Bariatric surgery. Hoping to lose weight for hip replacement with goal of 330 lbs. Weight gain due to changes in diet and decrease in activity.     Wants to work on MWM first and consider surgical approach down the line as appropriate.     Worked with a RD a long time ago at [facility] for weight loss.     Mobility is very limited.   Moving into an apartment with a pool next month.     Per PA note (not yet finalized at time of writing this)    Eating 2 meals a day, with snacks. Trying to decrease pop. Meals are provided by group home. However, will go to the store to get candy, chip, cookies, crackers, ice cream, and pudding, and soda. Has increased hunger. Struggles with getting full. From exam, however [place] have increased hunger,   Breakfast - cereal with milk   Lunch - provided by group home  Dinner - pizza   Snacks - cookies, animal crackers   Pop - 1 liter of regular pop at a time.      Activity - Hip pain limits activity. Will try to get up and walk around as much as possible. Cannot walk more then 50 yards at a time.     Fluids: OJ, pop (rosmery jean baptiste, dr. Pepper, [facility]) , powerade, crystal light, water, smoothies  ? Currently drinking 1-2 24 oz bottles of pop/day per pt. Pt s mother thinks he may be drinking more than this  ? Open to considering diet pop instead of regular. Ideally want to switch to water/sparkling water long-term   ? Has a soda stream per pt s mom    Vegetables he likes: broccoli, celery, carrots, [place] sprouts, asparagus   Likes baked beans    Alochol: rare    Nicotine: vapes     Additional information:  Disabled -  lives at [facility] - moving Feb 6th to a crisis housing apartment, will have ILS staff     Single     Recall Diet Questions Reviewed With Patient 1/9/2023   Describe what you typically consume for lunch (typical or most recent): candy soda(8)cans to 10cans a day   Describe what you typically consume for supper (typical or most recent): pizza   How many ounces of water, or other low calorie drinks, do you drink daily (8 oz=1 glass)? 0 oz   How many ounces of caffeine (coffee, tea, pop) do you drink daily (8 oz=1 glass)? 24 oz   How many ounces of milk do you drink daily (8 oz=1 glass) 0 oz   How often do you drink alcohol? Never       Eating Habits 1/9/2023   Do you have any dietary restrictions? No   Do you currently binge eat (eat a large amount of food in a short time)? Yes   Are you an emotional eater? Yes   Do you get up to eat after falling asleep? Yes       Dining Out History Reviewed With Patient 1/9/2023   How often do you dine out? Never.   Where do you dine out? (select all that apply) fast food chains   What types of food do you order when you dine out? hamburger         EXERCISE:       1/9/2023   How often do you exercise? Never   What keeps you from being more active?  Pain       NUTRITION DIAGNOSIS:  Obesity r/t long history of positive energy balance aeb BMI >30 kg/m2.    INTERVENTION:  Reviewed current dietary habits and pts history   Discussed long-term goals pt hopes to accomplish in RD appointments  Answered pt questions  Coordination of care   Nutrition education - Plate method, fats, low kcal beverages, starchy vs non-starchy vegetables, sources of carbohydrates, lean protein vs fattier proteins  AVS and handouts via [employer]      Questions Reviewed With Patient 1/9/2023   How ready are you to make changes regarding your weight? Number 1 = Not ready at all to make changes up to 10 = very ready. 1   How confident are you that you can change? 1 = Not confident that you  will be successful making changes up to 10 = very confident. 1       Expected Engagement: fair-good (good engagement during appt however pt rated confidence as a 1 per questionnaire)     GOALS:  1. Consider tracking intake in Lose It or MyFitnessPal  2. Aim for 1-2 pop/week and replace with water/low-sugar option  3. Buy 1 pop at a time as opposed to a 6 pack   4. Aim to replace ice cream with yogurt   5. Aim to keep fruit on hand (strawberries as an example)  6. Check in with ILS worker on foods to make/have in apartment once you move in next month.     Meal idea discussed: honey mustard chicken, beans, asparagus       RESOURCES:     Plate method can be used for general guidance on balanced meals/portion sizes (see link below for picture/more information)                 Make 1/2 your plate non starchy vegetables (cauliflower, broccoli, asparagus, [place] sprouts, lettuce, carrots, for example).                5+ oz lean protein sources (salmon/skinless chicken/turkey breast/pork loin/lean cuts of beef/ or non-animal protein such as black, kidney or kuo beans, tofu/edamame/tempeh)     (Note: 3 oz = deck of cards size)                 ~1 cup carbohydrate choices such as whole grain starches or starchy vegetables or fruit. For example: quinoa, brown rice, barley, potatoes, sweet potatoes, winter squash, peas, corn, or fruit.               Choose ~0-2 added fat servings at a meal (avocados, nut butters, nuts or seeds, olive oil, vegetable oils).    The Plate Method:  https://www.cdc.gov/diabetes/images/managing/Diabetes-Manage-Eat-Well-Plate-Graphic_600px.jpg    Building a Plate  [email or web address]    Protein Sources for Weight Loss  [email or web address]     Carbohydrates  [email or web address]     Mindful Eating  [email or web address]     Summary of Volumetrics Eating Plan  [email or web address]     Seated Exercises for Arms and Legs (can be done before or after  surgery)  [email or web address]    Follow up:    Wednesday, Feb 8th at 9:30 am    Time spent with patient: 60 minutes.  DHEERAJ Israel, RD, LD

## 2023-01-10 NOTE — LETTER
"1/10/2023       RE: Kimo Greenwood  [address]  [facility] [address]     Dear Colleague,    Thank you for referring your patient, Kimo Greenwood, to the [facility] [place] [facility]. Please see a copy of my visit note below.    Kimo Greenwood is a 42 year old male who is being evaluated via a billable video visit.      The patient has been notified of following:     \"This video visit will be conducted via a call between you and your physician/provider. We have found that certain health care needs can be provided without the need for an in-person physical exam.  This service lets us provide the care you need with a video conversation.  If a prescription is necessary we can send it directly to your pharmacy.  If lab work is needed we can place an order for that and you can then stop by our lab to have the test done at a later time.    Video visits are billed at different rates depending on your insurance coverage.  Please reach out to your insurance provider with any questions.    If during the course of the call the physician/provider feels a video visit is not appropriate, you will not be charged for this service.\"    Patient has given verbal consent for Video visit? Yes  How would you like to obtain your AVS? MyChart  If you are dropped from the video visit, the video invite should be resent to: Text to cell phone: [phone]  Will anyone else be joining your video visit? Yes, mother. Send to [email or web address]  {If patient encounters technical issues they should call [phone]      Video-Visit Details    Type of service:  Video Visit    Video Start Time: 1:03 pm  Video End Time: 2:03 pm    Originating Location (pt. Location): Home    Distant Location (provider location):  Offsite (providers home)     Platform used for Video Visit: Metamarkets    During this virtual visit the patient is located in " "[place], patient verifies this as the location during the entirety of this visit.     New Bariatric Nutrition Consultation Note    Reason For Visit: Nutrition Assessment    Kimo Greenwood is a 42 year old presenting today for new bariatric nutrition consult.   Pt is interested in laparoscopic sleeve gastrectomy.  Will proceed with MWM at this time per pt/his mother.     Patient is accompanied by his mother, Belen.      This is pt's first required nutrition visits prior to surgery. Did NOT review the surgical information at this time - focusing on weight loss first.    Pt referred by JERRY Marinelli on January 10, 2023.    Coordination note:   Needs baseline labs  Needs Psych eval  Needs PCP letter of support  40 lb weight loss from initial  Surgeon meet and greet with Dr. Madrigal  Needs letter of support from therapist  Needs letter of support from psychiatrist       CO-MORBIDITIES OF OBESITY INCLUDE:       1/9/2023   I have the following health issues associated with obesity: None of the above     PMH:     Per PA note:  \"Hip OA - needing hip replacement.      LOREN - uses CPAP every night. Followed by PCP      Barretts esophagus - has not had any GERD symptoms recently. Well controlled on PPI.      HTN - well controlled on medications      Degenerative disk disease - causes low back pain. 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 Followed by urology\"    Depression - has therapist/psych, hx of suicide attempt per questionnaire     Speech/Language delay    SUPPORT:  Support System Reviewed With Patient 1/9/2023   Who do you have in your support network that can be available to help you for the first 2 weeks after surgery? agency   Who can you count on for support throughout your weight loss surgery journey? fully supportive       ANTHROPOMETRICS:  Consult weight 1/9/23: 407 lbs with BMI 55.28    Estimated body mass index is 55.28 kg/m  as calculated from the following:    Height as of 1/9/23: 1.829 m " (6').    Weight as of 1/9/23: 184.9 kg (407 lb 9.6 oz).    Goal weight for hip replacement per Shasha's note: 330 lbs    No flowsheet data found.    Weight Loss Questions Reviewed With Patient 1/9/2023   How long have you been overweight? Since early childhood     MEDICATIONS FOR WEIGHT LOSS:  Wegovy prescribed 1/9/22    Hx   Saxenda - denied at first but was able to get with appeal  Topiramate (ineffective)    SUPPLEMENT INFORMATION:  Did not discuss today    Labs 1/9/23  Vit D 32  PTH WNL  Lipids WNL aside from elevated LDL  A1C 5.6   WBC off - WBC high, RBC low, Hgb low, Hematocrit low    Hx of Vit D def    NUTRITION HISTORY:  Pt present today with his mom and legal guardian, Belen.     Patient is considering Bariatric surgery. Hoping to lose weight for hip replacement with goal of 330 lbs. Weight gain due to changes in diet and decrease in activity.     Wants to work on MWM first and consider surgical approach down the line as appropriate.     Worked with a RD a long time ago at [facility] for weight loss.     Mobility is very limited.   Moving into an apartment with a pool next month.     Per PA note (not yet finalized at time of writing this)    Eating 2 meals a day, with snacks. Trying to decrease pop. Meals are provided by group home. However, will go to the store to get candy, chip, cookies, crackers, ice cream, and pudding, and soda. Has increased hunger. Struggles with getting full. From exam, however Willard have increased hunger,   Breakfast - cereal with milk   Lunch - provided by group home  Dinner - pizza   Snacks - cookies, animal crackers   Pop - 1 liter of regular pop at a time.      Activity - Hip pain limits activity. Will try to get up and walk around as much as possible. Cannot walk more then 50 yards at a time.     Fluids: OJ, pop (rosmery jean baptiste, dr. Pepper, Tamica) , powerade, crystal light, water, smoothies  ? Currently drinking 1-2 24 oz bottles of pop/day per pt. Pt s mother thinks he may be  drinking more than this  ? Open to considering diet pop instead of regular. Ideally want to switch to water/sparkling water long-term   ? Has a soda stream per pt s mom    Vegetables he likes: broccoli, celery, carrots, Brussel sprouts, asparagus   Likes baked beans    Alochol: rare    Nicotine: vapes     Additional information:  Disabled - lives at [facility] - moving Feb 6th to a crisis housing apartment, will have ILS staff     Single     Recall Diet Questions Reviewed With Patient 1/9/2023   Describe what you typically consume for lunch (typical or most recent): candy soda(8)cans to 10cans a day   Describe what you typically consume for supper (typical or most recent): pizza   How many ounces of water, or other low calorie drinks, do you drink daily (8 oz=1 glass)? 0 oz   How many ounces of caffeine (coffee, tea, pop) do you drink daily (8 oz=1 glass)? 24 oz   How many ounces of milk do you drink daily (8 oz=1 glass) 0 oz   How often do you drink alcohol? Never       Eating Habits 1/9/2023   Do you have any dietary restrictions? No   Do you currently binge eat (eat a large amount of food in a short time)? Yes   Are you an emotional eater? Yes   Do you get up to eat after falling asleep? Yes       Dining Out History Reviewed With Patient 1/9/2023   How often do you dine out? Never.   Where do you dine out? (select all that apply) fast food chains   What types of food do you order when you dine out? hamburger         EXERCISE:       1/9/2023   How often do you exercise? Never   What keeps you from being more active?  Pain       NUTRITION DIAGNOSIS:  Obesity r/t long history of positive energy balance aeb BMI >30 kg/m2.    INTERVENTION:  Reviewed current dietary habits and pts history   Discussed long-term goals pt hopes to accomplish in RD appointments  Answered pt questions  Coordination of care   Nutrition education - Plate method, fats, low kcal beverages, starchy vs non-starchy  vegetables, sources of carbohydrates, lean protein vs fattier proteins  AVS and handouts via [employer]      Questions Reviewed With Patient 1/9/2023   How ready are you to make changes regarding your weight? Number 1 = Not ready at all to make changes up to 10 = very ready. 1   How confident are you that you can change? 1 = Not confident that you will be successful making changes up to 10 = very confident. 1       Expected Engagement: fair-good (good engagement during appt however pt rated confidence as a 1 per questionnaire)     GOALS:  1. Consider tracking intake in Lose It or MyFitnessPal  2. Aim for 1-2 pop/week and replace with water/low-sugar option  3. Buy 1 pop at a time as opposed to a 6 pack   4. Aim to replace ice cream with yogurt   5. Aim to keep fruit on hand (strawberries as an example)  6. Check in with ILS worker on foods to make/have in apartment once you move in next month.     Meal idea discussed: honey mustard chicken, beans, asparagus       RESOURCES:     Plate method can be used for general guidance on balanced meals/portion sizes (see link below for picture/more information)                 Make 1/2 your plate non starchy vegetables (cauliflower, broccoli, asparagus, Oketo sprouts, lettuce, carrots, for example).                5+ oz lean protein sources (salmon/skinless chicken/turkey breast/pork loin/lean cuts of beef/ or non-animal protein such as black, kidney or kuo beans, tofu/edamame/tempeh)     (Note: 3 oz = deck of cards size)                 ~1 cup carbohydrate choices such as whole grain starches or starchy vegetables or fruit. For example: quinoa, brown rice, barley, potatoes, sweet potatoes, winter squash, peas, corn, or fruit.               Choose ~0-2 added fat servings at a meal (avocados, nut butters, nuts or seeds, olive oil, vegetable oils).    The Plate Method:  https://www.cdc.gov/diabetes/images/managing/Diabetes-Manage-Eat-Well-Plate-Graphic_600px.jpg    Building a  Plate  Http://[email or web address]    Protein Sources for Weight Loss  [email or web address]     Carbohydrates  [email or web address]     Mindful Eating  [email or web address]     Summary of Volumetrics Eating Plan  [email or web address]     Seated Exercises for Arms and Legs (can be done before or after surgery)  [email or web address]    Follow up:    Wednesday, Feb 8th at 9:30 am    Time spent with patient: 60 minutes.  DHEERAJ Israel, RD, LD

## 2023-01-10 NOTE — TELEPHONE ENCOUNTER
"See Mychart message:    \"can you write a prescription to [facility] for a wheelchair as Willard has limited mobility. We are looking at weight loss surgery. He has a consultation on the 9th.\"    Rx pended for review.    Judy ALEXANDER RN    "

## 2023-01-10 NOTE — TELEPHONE ENCOUNTER
Encounter was created, but not routed to the PA Team's pool by liaison. Due to non-urgent nature, I will route it to the pool to be completed in the order it was received.

## 2023-01-11 NOTE — ASSESSMENT & PLAN NOTE
GERD symptoms well controlled with PPI. Last EGD was in 2020. Will need to complete EGD prior to surgery. Can be done by Dr. Arzola or Dr. Madrigal. Patient to reach out if would like an order placed.

## 2023-01-11 NOTE — TELEPHONE ENCOUNTER
Recommend OT referral for home safety assessment    We can attempt to put in a DME order for wheelchair wheelchair however in the insurance may require a face-to-face encounter with several different criteria as to satisfy so he may need a follow-up visit to go over this if they do not improve.    Thank you,    Ciro Love MD

## 2023-01-11 NOTE — ASSESSMENT & PLAN NOTE
Obesity onset in early 20s. Weight gain gradual. Was previously able to lose weight with the help of Saxenda. In Fall of 2022 was 350lbs, but Saxenda seems to not be working as well and weight has continued to increase. Needs to lose weight to qualify for total hip replacement - goal of <330lbs.     History includes - LOREN, GERD, astham, garcia's esophagus, LBP, HTN, overactive bladder, and depression. Willard has an intellectual delay, but I have no concerns during my visit today. He was able to articulate his thoughts well and asked questions about surgery. However, there seems to be some disconnect on what and how much he is eating. Group home staff stated he is eating much more then Willard states.     Discussed AOMs today:   - Phentermine - previously tried, but no effect on weight. No side effects   - Topiramate - previously tired, but no effect on weight. No side effects   - Naltrexone - can consider in the future   - GLP-1 - he is currently taking Victoza 3.0mg, but is no longer helping with hunger. Tolerating well, no side effects Will transition to Wegovy 1.0mg today due to not being on Victoza for the past 15 weeks. Can consider Ozempic if needed.

## 2023-01-11 NOTE — ASSESSMENT & PLAN NOTE
Patient is aware that smoking cessation is needed 3 months prior to surgery. Currently vapes nicotine daily. Is unsure if ready to quit. Will have further discussion with [facility] pharmacy.

## 2023-01-11 NOTE — TELEPHONE ENCOUNTER
[employer] sent with information below. DME order faxed to [facility] in [place] at [phone].    Judy ALEXANDER RN

## 2023-01-12 ENCOUNTER — CARE COORDINATION (OUTPATIENT)
Dept: ENDOCRINOLOGY | Facility: CLINIC | Age: 43
End: 2023-01-12

## 2023-01-12 NOTE — PROGRESS NOTES
Bariatric Task List updated.  Bariatric information/clearance letters sent to patient via [employer].    Bariatric Task List    Fax:  Please fax all paperwork to: [phone] -     Status:  Is patient a candidate for bariatric surgery?:  patient is a candidate for bariatric surgery -     Cleared to schedule surgeon consult?:  cleared to schedule surgeon consult -     Status:  surgery evaluation in process -     Surgeon: Dr. Madrigal -     Tentative surgery month/year: TBD -        Insurance: Insurance:  [employer] -      Contact insurance to discuss coverage: Needed -       Cigna: PCP Recommendation and Medical Clearance:    -     HP Referral:    -      Advanced beneficiary notification (ABN) for Medicare patients for RD visits   and surgery:   -      Weight history:   -     Other:    -        Patient Info: Initial Weight:  407 -     Date of Initial Weight/Height:  1/9/2023 -     Goal Weight (lbs):  367 -     Required Weight Loss:  40 -     Surgery Type:  sleeve gastrectomy -     Multidisciplinary Meeting:    -        Dietician Visits: Structured weight loss required by insurance?:    -     Dietician Visit 1:  Needed - 1/10/23, Aydee Pozo [place]   Dietician Visit 2:  Needed - 2/8/23, Aydee Pozo [place]   Dietician Visit 3:  Needed -     Dietician Visit 4:    -     Dietician Visit 5:    -     Dietician Visit 6:    -     Dietician Visit additional:    - Monthly until surgery - [place]   Clearance from dietician to see surgeon?:    -     Dietician Notes:    -        Psychological Evaluation: Psych eval:  Needed - List and letter sent to patient, 1/12/23, [place]   Therapist letter of support:  Needed - Letter sent to patient, 1/12/23, [place]   Psychiatrist letter of support:  Needed - Letter sent to patient, 1/12/23, [place]   Establish care with therapist:    -     Complete eating disorder evaluation:    -     Letter of clearance from therapist/eating disorder program:    -     Other:    -        Lab Work: Complete  "Blood Count:  Completed -     Comprehensive Metabolic Panel:  Completed -     Vitamin D:  Completed -     PTH:  Completed -     Hgb A1c:  Completed -      Lipids: Completed -      TSH ([facility], [facility], [place]):   -       Ferritin:   -       Folate:   -       Testosterone, Total and Free:   -     Thiamine:   -     Vitamin A:   -     Vitamin B12:   -     Zinc:   -     C-peptide:   -     H. pylori:    -     MRSA (2 swabs, minimum 48 hours apart):   -     Nicotine Testing:    -     Recheck Vitamin D:   -     Other:    -        Consults/ Clearance Sleep Medicine:  Needed - Letter sent to patient, 1/12/23, [place]   Cardiac:    -     Pain:   -     Dental:    -     Endocrine:    -     Gastroenterology:    -     Vascular Medicine:    -     Hematology:    -     Medical Weight Management:   -     Physical Therapy/Exercise:    -     Nephrology:    -     Neurology:    -     Pulmonology:    -     Rheumatology:    -     Other:    -     Other:    -     Other:    -        Testing: UGI:    -     EGD:  Needed -     Sleep Study:   -     Other:   -     Other:    -        PCP: Establish care with PCP:  Completed -     Follow up with PCP:    -     PCP letter of support:  Needed - Letter sent to patient, 1/12/23, [place]      Stopping Smoking/ Alcohol Use: Quit tobacco use (3 months smoke free)?:  Needed -     Quit date:    -     Quit alcohol use:   -     Quit date:   -     Other:   -     Quit date:   -        Patient Education:  Information Session:  Needed -     Given \"Making your decision\" handout?:  Yes -     Given \"A Roadmap to you Weight Loss Surgery\" handout?: Yes -     Given \"Get Well Loop\" information?: Yes -     Given support group information?:    -     Attended support group?:  Needed -     Support plan in place?:    -     Research consents signed?:    -     Avoid NSAIDS/ Alternate Plan for Pain:   -        Additional Surgery Requirements: Review Coag plan:    -     HgA1c <8:    -     Inpatient pain consult:    -     Final nicotine screen:    " -     Dental work complete:    -     Birth control plan:    -     Gallstone prevention plan (Actigall for 6 months postop):   -     Other:   -     Other:   -        Final Tasks:  Before surgery online class:  Needed -     Before surgery online class website link:  [email or web address]   After surgery online class:  Needed -     After surgery online class website link:  [email or web address]   Nurse visit per clinic:  Needed -     History and Physical per clinic:   -     Final labs per clinic: Needed -     Chest xray per clinic:   -     Electrocardiogram (ECG) per clinic:   -     Other:   -        Notes:   -

## 2023-01-12 NOTE — TELEPHONE ENCOUNTER
Prior Authorization Approval    Authorization Effective Date: 12/12/2022  Authorization Expiration Date: 1/12/2024  Medication: Wegovy-APPROVED  Approved Dose/Quantity:   Reference #:     Insurance Company: [employer]s - Phone [phone] Fax [phone]  Expected CoPay:       CoPay Card Available:      Foundation Assistance Needed:    Which Pharmacy is filling the prescription (Not needed for infusion/clinic administered): [facility] - [place], [place] - [address] [address]  Pharmacy Notified: No  Patient Notified: No

## 2023-01-12 NOTE — TELEPHONE ENCOUNTER
Central Prior Authorization Team   Phone: [phone]      PA Initiation    Medication: Wegovy  Insurance Company: [employer] Scripts - Phone [phone] Fax [phone]  Pharmacy Filling the Rx: [facility] - [place], [place] - [address]  Filling Pharmacy Phone: [phone]  Filling Pharmacy Fax:    Start Date: 1/11/2023

## 2023-01-17 ENCOUNTER — TELEPHONE (OUTPATIENT)
Dept: ENDOCRINOLOGY | Facility: CLINIC | Age: 43
End: 2023-01-17

## 2023-01-17 ENCOUNTER — MYC MEDICAL ADVICE (OUTPATIENT)
Dept: FAMILY MEDICINE | Facility: CLINIC | Age: 43
End: 2023-01-17

## 2023-01-17 NOTE — TELEPHONE ENCOUNTER
MTM referral from: [facility] visit (referral by provider)    MTM referral outreach attempt #2 on January 17, 2023 at 9:45 AM      Outcome: Patient is not interested at this time because medication is out of stock. Pt would like to be contacted in regards to what are other options or next step, will route to MTM Pharmacist/Provider as an FYI. Thank you for the referral.     Melvin Estrada MTM coordinator

## 2023-01-24 ENCOUNTER — MYC MEDICAL ADVICE (OUTPATIENT)
Dept: ENDOCRINOLOGY | Facility: CLINIC | Age: 43
End: 2023-01-24
Payer: COMMERCIAL

## 2023-01-24 DIAGNOSIS — E66.01 CLASS 3 SEVERE OBESITY DUE TO EXCESS CALORIES WITH SERIOUS COMORBIDITY AND BODY MASS INDEX (BMI) OF 50.0 TO 59.9 IN ADULT (H): ICD-10-CM

## 2023-01-24 DIAGNOSIS — E66.813 CLASS 3 SEVERE OBESITY DUE TO EXCESS CALORIES WITH SERIOUS COMORBIDITY AND BODY MASS INDEX (BMI) OF 50.0 TO 59.9 IN ADULT (H): ICD-10-CM

## 2023-01-24 RX ORDER — SEMAGLUTIDE 1 MG/.5ML
1 INJECTION, SOLUTION SUBCUTANEOUS WEEKLY
Qty: 2 ML | Refills: 1 | Status: SHIPPED | OUTPATIENT
Start: 2023-01-24 | End: 2023-01-26

## 2023-01-24 NOTE — TELEPHONE ENCOUNTER
Patient requesting Rx be sent to [facility] in [place] as his other pharmacy is not able to get the prescription.

## 2023-01-26 RX ORDER — SEMAGLUTIDE 1 MG/.5ML
1 INJECTION, SOLUTION SUBCUTANEOUS WEEKLY
Qty: 2 ML | Refills: 1 | Status: SHIPPED | OUTPATIENT
Start: 2023-01-26 | End: 2023-02-02

## 2023-01-27 ENCOUNTER — DOCUMENTATION ONLY (OUTPATIENT)
Dept: OTHER | Age: 43
End: 2023-01-27

## 2023-01-28 ENCOUNTER — TELEPHONE (OUTPATIENT)
Dept: ENDOCRINOLOGY | Facility: CLINIC | Age: 43
End: 2023-01-28

## 2023-01-31 ENCOUNTER — TELEPHONE (OUTPATIENT)
Dept: ENDOCRINOLOGY | Facility: CLINIC | Age: 43
End: 2023-01-31
Payer: COMMERCIAL

## 2023-01-31 NOTE — TELEPHONE ENCOUNTER
Reason for Call:  Other prescription    Detailed comments: Claire needs a copy of the prescription Semaglutide-Weight Management (WEGOVY) 1 MG/0.5ML SOAJ signed and faxed back in order for the patient to be able to take the medication. Fax to [phone].     Phone Number Patient can be reached at: Cell number on file:  Call [facility] (group home) [phone]    Best Time: Anytime    Can we leave a detailed message on this number? YES    Call taken on 1/31/2023 at 4:01 PM by Nya Naranjo

## 2023-02-02 DIAGNOSIS — E66.01 CLASS 3 SEVERE OBESITY DUE TO EXCESS CALORIES WITH SERIOUS COMORBIDITY AND BODY MASS INDEX (BMI) OF 50.0 TO 59.9 IN ADULT (H): ICD-10-CM

## 2023-02-02 DIAGNOSIS — E66.813 CLASS 3 SEVERE OBESITY DUE TO EXCESS CALORIES WITH SERIOUS COMORBIDITY AND BODY MASS INDEX (BMI) OF 50.0 TO 59.9 IN ADULT (H): ICD-10-CM

## 2023-02-02 RX ORDER — SEMAGLUTIDE 1 MG/.5ML
1 INJECTION, SOLUTION SUBCUTANEOUS WEEKLY
Qty: 2 ML | Refills: 1 | Status: SHIPPED | OUTPATIENT
Start: 2023-02-02 | End: 2023-03-13

## 2023-02-06 DIAGNOSIS — I10 ESSENTIAL HYPERTENSION: ICD-10-CM

## 2023-02-06 DIAGNOSIS — K29.80 DUODENITIS: ICD-10-CM

## 2023-02-06 DIAGNOSIS — K21.9 LPRD (LARYNGOPHARYNGEAL REFLUX DISEASE): ICD-10-CM

## 2023-02-06 DIAGNOSIS — I10 HYPERTENSION GOAL BP (BLOOD PRESSURE) < 140/90: ICD-10-CM

## 2023-02-06 RX ORDER — HYDROCHLOROTHIAZIDE 25 MG/1
25 TABLET ORAL DAILY
Qty: 30 TABLET | Refills: 7 | Status: SHIPPED | OUTPATIENT
Start: 2023-02-06 | End: 2024-09-23

## 2023-02-06 RX ORDER — LISINOPRIL 10 MG/1
10 TABLET ORAL EVERY MORNING
Qty: 30 TABLET | Refills: 7 | Status: SHIPPED | OUTPATIENT
Start: 2023-02-06 | End: 2024-09-23

## 2023-02-06 RX ORDER — PANTOPRAZOLE SODIUM 40 MG/1
TABLET, DELAYED RELEASE ORAL
Qty: 30 TABLET | Refills: 7 | Status: SHIPPED | OUTPATIENT
Start: 2023-02-06 | End: 2024-09-23

## 2023-02-08 ENCOUNTER — TELEPHONE (OUTPATIENT)
Dept: ENDOCRINOLOGY | Facility: CLINIC | Age: 43
End: 2023-02-08

## 2023-02-08 NOTE — PROGRESS NOTES
"Kimo Greenwood is a 42 year old male who is being evaluated via a billable video visit.      The patient has been notified of following:     \"This video visit will be conducted via a call between you and your physician/provider. We have found that certain health care needs can be provided without the need for an in-person physical exam.  This service lets us provide the care you need with a video conversation.  If a prescription is necessary we can send it directly to your pharmacy.  If lab work is needed we can place an order for that and you can then stop by our lab to have the test done at a later time.    Video visits are billed at different rates depending on your insurance coverage.  Please reach out to your insurance provider with any questions.    If during the course of the call the physician/provider feels a video visit is not appropriate, you will not be charged for this service.\"    Patient has given verbal consent for Video visit? Yes  How would you like to obtain your AVS? MyChart  If you are dropped from the video visit, the video invite should be resent to: Text to cell phone: [phone]  Will anyone else be joining your video visit?  DERRICK Spangler worker  {If patient encounters technical issues they should call [phone]      Video-Visit Details    Type of service:  Video Visit    Video Start Time: 10:38 am  Video End Time: 11:03 am    Originating Location (pt. Location): Home    Distant Location (provider location):  Offsite (providers home)     Platform used for Video Visit: [employer]    During this virtual visit the patient is located in [place], patient verifies this as the location during the entirety of this visit.     Nutrition Consultation Note    Reason For Visit: Nutrition Assessment    Kimo Greenwood is a 42 year old presenting today for return nutrition consult.   Pt is interested in laparoscopic sleeve gastrectomy.  Will proceed with MWM at this time per pt/his mother.     Patient is " "accompanied by his mother, Belen.      This is pt's 2nd required nutrition visits prior to surgery. Did NOT review the surgical information at this time - focusing on weight loss first. Patient is meeting with Dr. Madrigal on 2/16/23.     Pt referred by JERRY Marinelli on January 10, 2023.    Coordination note (if pursuing surgery in future)   Needs baseline labs - Done 1/9/23  Needs Psych eval  Needs PCP letter of support  40 lb weight loss from initial  Surgeon meet and greet with Dr. Madrigal  Needs letter of support from therapist  Needs letter of support from psychiatrist       CO-MORBIDITIES OF OBESITY INCLUDE:       1/9/2023   I have the following health issues associated with obesity: None of the above     PMH:     Per PA note:  \"Hip OA - needing hip replacement.      LOREN - uses CPAP every night. Followed by PCP      Barretts esophagus - has not had any GERD symptoms recently. Well controlled on PPI.      HTN - well controlled on medications      Degenerative disk disease - causes low back pain. 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 Followed by urology\"    Depression - has therapist/psych, hx of suicide attempt per questionnaire     Speech/Language delay    SUPPORT:  Support System Reviewed With Patient 1/9/2023   Who do you have in your support network that can be available to help you for the first 2 weeks after surgery? agency   Who can you count on for support throughout your weight loss surgery journey? fully supportive       ANTHROPOMETRICS:  Consult weight 1/9/23: 407 lbs with BMI 55.28    Estimated body mass index is 55.28 kg/m  as calculated from the following:    Height as of 1/9/23: 1.829 m (6').    Weight as of 1/9/23: 184.9 kg (407 lb 9.6 oz).     Current weight: Did not update today    Goal weight for hip replacement per Nya Camarena's note: 330 lbs    No flowsheet data found.    Weight Loss Questions Reviewed With Patient 1/9/2023   How long have you been overweight? Since " early childhood     MEDICATIONS FOR WEIGHT LOSS:  Wegovy prescribed 1/9/22    Hx   Saxenda -  Had been denied at first but was able to get with appeal per pt  Topiramate (ineffective)    SUPPLEMENT INFORMATION:  Did not discuss today    Labs 1/9/23  Vit D 32  PTH WNL  Lipids WNL aside from elevated LDL  A1C 5.6   WBC off - WBC high, RBC low, Hgb low, Hematocrit low    Hx of Vit D def    NUTRITION HISTORY:  Pt present today with his mom and legal guardian, Belen.     Patient is considering Bariatric surgery. Hoping to lose weight for hip replacement with goal of 330 lbs. Weight gain due to changes in diet and decrease in activity.     Wants to work on MWM first and consider surgical approach down the line as appropriate.     Worked with a RD a long time ago at [facility] for weight loss.     Mobility is very limited.   Moving into an apartment with a pool next month.     Per PA note (not yet finalized at time of writing this)    Eating 2 meals a day, with snacks. Trying to decrease pop. Meals are provided by group home. However, will go to the store to get candy, chip, cookies, crackers, ice cream, and pudding, and soda. Has increased hunger. Struggles with getting full. From exam, however Willard have increased hunger,   Breakfast - cereal with milk   Lunch - provided by group home  Dinner - pizza   Snacks - cookies, animal crackers   Pop - 1 liter of regular pop at a time.      Activity - Hip pain limits activity. Will try to get up and walk around as much as possible. Cannot walk more then 50 yards at a time.     Fluids: OJ, pop (rosmery jean baptiste, dr. Pepper, [facility]) , powerade, crystal light, water, smoothies  ? Currently drinking 1-2 24 oz bottles of pop/day per pt. Pt s mother thinks he may be drinking more than this  ? Open to considering diet pop instead of regular. Ideally want to switch to water/sparkling water long-term   ? Has a soda stream per pt s mom    Vegetables he likes: broccoli, celery, carrots, Brussel sprouts,  asparagus   Likes baked beans    Alochol: rare    Nicotine: vapes     Feb 2023:  Patient moved into a new place since last seen.  Patient present today with Crys, one of his helpers.     Per Crys/pt recent foods have included:    Breakfast - oatmeal or oatmeal or pb toast or egg sausage sandwich  L - varies, example: salad  D - varies, homemade pizza, homemade chicken drumsticks, grilled cheese with side of chips  Snacks: yoplait with peaches, banana bread, oranges/apples    Patient and Crys wanted to go over meal ideas for Willard. Did not review last month's goals in detail. Will carry over to this month     Previous goals:  1. Consider tracking intake in Lose It or MyFitnessPal - Not yet, has been writing down food this week.  2. Aim for 1-2 pop/week and replace with water/low-sugar option  3. Buy 1 pop at a time as opposed to a 6 pack   4. Aim to replace ice cream with yogurt   5. Aim to keep fruit on hand (strawberries as an example)  6. Check in with ILS worker on foods to make/have in apartment once you move in next month. - Met    Additional information:  Disabled - lives at [facility] - moving Feb 6th to a crisis housing apartment, will have ILS staff     Single     Recall Diet Questions Reviewed With Patient 1/9/2023   Describe what you typically consume for lunch (typical or most recent): candy soda(8)cans to 10cans a day   Describe what you typically consume for supper (typical or most recent): pizza   How many ounces of water, or other low calorie drinks, do you drink daily (8 oz=1 glass)? 0 oz   How many ounces of caffeine (coffee, tea, pop) do you drink daily (8 oz=1 glass)? 24 oz   How many ounces of milk do you drink daily (8 oz=1 glass) 0 oz   How often do you drink alcohol? Never       Eating Habits 1/9/2023   Do you have any dietary restrictions? No   Do you currently binge eat (eat a large amount of food in a short time)? Yes   Are you an emotional eater? Yes   Do you  get up to eat after falling asleep? Yes       Dining Out History Reviewed With Patient 1/9/2023   How often do you dine out? Never.   Where do you dine out? (select all that apply) fast food chains   What types of food do you order when you dine out? hamburger         EXERCISE:       1/9/2023   How often do you exercise? Never   What keeps you from being more active?  Pain       NUTRITION DIAGNOSIS:  Obesity r/t long history of positive energy balance aeb BMI >30 kg/m2.    INTERVENTION:  Reviewed current dietary habits and pts history   Discussed long-term goals pt hopes to accomplish in RD appointments  Answered pt questions  Coordination of care   Nutrition education - Plate method, fats, low kcal beverages, starchy vs non-starchy vegetables, sources of carbohydrates, lean protein vs fattier proteins  AVS and handouts via [employer]      Questions Reviewed With Patient 1/9/2023   How ready are you to make changes regarding your weight? Number 1 = Not ready at all to make changes up to 10 = very ready. 1   How confident are you that you can change? 1 = Not confident that you will be successful making changes up to 10 = very confident. 1       Expected Engagement: fair-good (good engagement during appt however pt rated confidence as a 1 per questionnaire)     GOALS:  7. Consider tracking intake in Lose It or MyFitnessPal or journing intake.   8. Aim for 1-2 pop/week and replace with water/low-sugar option  9. Buy 1 pop at a time as opposed to a 6 pack   10. Aim to replace ice cream with yogurt   11. Aim to keep fruit on hand (strawberries as an example)    Meal ideas or components discussed: honey mustard chicken, baked beans, asparagus, broccoli, chili with beans and beef, white chicken chili with onions/peppers, fish - walleye, salmon, etc., Barilla Protein + pasta, loaded baked potatoe, sweet potatoe breakfast skillet with eggs, tuna salad, tuna casserole       RESOURCES:     Plate method can be used for general  guidance on balanced meals/portion sizes (see link below for picture/more information)                 Make 1/2 your plate non starchy vegetables (cauliflower, broccoli, asparagus, [place] sprouts, lettuce, carrots, for example).                5+ oz lean protein sources (salmon/skinless chicken/turkey breast/pork loin/lean cuts of beef/ or non-animal protein such as black, kidney or kuo beans, tofu/edamame/tempeh)     (Note: 3 oz = deck of cards size)                 ~1 cup carbohydrate choices such as whole grain starches or starchy vegetables or fruit. For example: quinoa, brown rice, barley, potatoes, sweet potatoes, winter squash, peas, corn, or fruit.               Choose ~0-2 added fat servings at a meal (avocados, nut butters, nuts or seeds, olive oil, vegetable oils).    The Plate Method:  https://www.cdc.gov/diabetes/images/managing/Diabetes-Manage-Eat-Well-Plate-Graphic_600px.jpg    Building a Plate  [email or web address]    Protein Sources for Weight Loss  [email or web address]     Carbohydrates  [email or web address]     Mindful Eating  [email or web address]     Summary of Volumetrics Eating Plan  [email or web address]     Seated Exercises for Arms and Legs (can be done before or after surgery)  [email or web address]    Follow up:    Thursday, March 23rd at 12:00    Time spent with patient: 25 minutes.  DHEERAJ Israel, RD, LD

## 2023-02-08 NOTE — TELEPHONE ENCOUNTER
Spoke with patients mom, Belen, via [employer].     Belen informed me that Willard is not able to meet today - he just moved and his new place has very bad service. She was going to switch our visit to a phone call but reports even phone service is bad and she could barely hear him when she called to talk to him about the appointment today.     They have someone coming out to Willard's new place today to help work on their Wifi. Rescheduled to next Thursday.     Marked as no show per policy as it was a ate cancel. Belen aware and okay with this.     She also let me know she plans to reschedule his appt with Dr. Madrigal next week as they want to work on weight loss first. Will call to reschedule. Set up appointment with me at same time as Willard's currently scheduled appt with Dr. Madrigal.     DHEERAJ Chinchilla, RD, LD  Clinic #: [phone]

## 2023-02-13 NOTE — ED NOTES
Bed: ED07  Expected date:   Expected time:   Means of arrival:   Comments:  Triage Timo ready   clear to auscultation bilaterally

## 2023-02-14 ENCOUNTER — RX ONLY (RX ONLY)
Age: 43
End: 2023-02-14

## 2023-02-14 RX ORDER — HYDROCORTISONE 25 MG/G
2.5% OINTMENT TOPICAL BID
Qty: 28.35 | Refills: 2 | Status: ERX

## 2023-02-16 ENCOUNTER — VIRTUAL VISIT (OUTPATIENT)
Dept: ENDOCRINOLOGY | Facility: CLINIC | Age: 43
End: 2023-02-16
Payer: COMMERCIAL

## 2023-02-16 DIAGNOSIS — E66.813 CLASS 3 SEVERE OBESITY DUE TO EXCESS CALORIES WITH SERIOUS COMORBIDITY AND BODY MASS INDEX (BMI) OF 50.0 TO 59.9 IN ADULT (H): ICD-10-CM

## 2023-02-16 DIAGNOSIS — Z71.3 NUTRITIONAL COUNSELING: Primary | ICD-10-CM

## 2023-02-16 DIAGNOSIS — E66.01 CLASS 3 SEVERE OBESITY DUE TO EXCESS CALORIES WITH SERIOUS COMORBIDITY AND BODY MASS INDEX (BMI) OF 50.0 TO 59.9 IN ADULT (H): ICD-10-CM

## 2023-02-16 PROCEDURE — 97803 MED NUTRITION INDIV SUBSEQ: CPT | Mod: VID | Performed by: DIETITIAN, REGISTERED

## 2023-02-16 NOTE — LETTER
"2/16/2023       RE: Kimo Greenwood  [address]  [place] [address]     Dear Colleague,    Thank you for referring your patient, Kimo Greenwood, to the [facility] [place] [facility]. Please see a copy of my visit note below.    Kimo Greenwood is a 42 year old male who is being evaluated via a billable video visit.      The patient has been notified of following:     \"This video visit will be conducted via a call between you and your physician/provider. We have found that certain health care needs can be provided without the need for an in-person physical exam.  This service lets us provide the care you need with a video conversation.  If a prescription is necessary we can send it directly to your pharmacy.  If lab work is needed we can place an order for that and you can then stop by our lab to have the test done at a later time.    Video visits are billed at different rates depending on your insurance coverage.  Please reach out to your insurance provider with any questions.    If during the course of the call the physician/provider feels a video visit is not appropriate, you will not be charged for this service.\"    Patient has given verbal consent for Video visit? Yes  How would you like to obtain your AVS? MyChart  If you are dropped from the video visit, the video invite should be resent to: Text to cell phone: [phone]  Will anyone else be joining your video visit?  DERRICK Spangler worker  {If patient encounters technical issues they should call [phone]      Video-Visit Details    Type of service:  Video Visit    Video Start Time: 10:38 am  Video End Time: 11:03 am    Originating Location (pt. Location): Home    Distant Location (provider location):  Offsite (providers home)     Platform used for Video Visit: [employer]    During this virtual visit the patient is located in [place], patient verifies this " "as the location during the entirety of this visit.     Nutrition Consultation Note    Reason For Visit: Nutrition Assessment    Kimo Greenwood is a 42 year old presenting today for return nutrition consult.   Pt is interested in laparoscopic sleeve gastrectomy.  Will proceed with MWM at this time per pt/his mother.     Patient is accompanied by his mother, Belen.      This is pt's 2nd required nutrition visits prior to surgery. Did NOT review the surgical information at this time - focusing on weight loss first. Patient is meeting with Dr. Madrigal on 2/16/23.     Pt referred by JERRY Marinelli on January 10, 2023.    Coordination note (if pursuing surgery in future)   Needs baseline labs - Done 1/9/23  Needs Psych eval  Needs PCP letter of support  40 lb weight loss from initial  Surgeon meet and greet with Dr. Madrigal  Needs letter of support from therapist  Needs letter of support from psychiatrist       CO-MORBIDITIES OF OBESITY INCLUDE:       1/9/2023   I have the following health issues associated with obesity: None of the above     PMH:     Per PA note:  \"Hip OA - needing hip replacement.      LOREN - uses CPAP every night. Followed by PCP      Barretts esophagus - has not had any GERD symptoms recently. Well controlled on PPI.      HTN - well controlled on medications      Degenerative disk disease - causes low back pain. 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 Followed by urology\"    Depression - has therapist/psych, hx of suicide attempt per questionnaire     Speech/Language delay    SUPPORT:  Support System Reviewed With Patient 1/9/2023   Who do you have in your support network that can be available to help you for the first 2 weeks after surgery? agency   Who can you count on for support throughout your weight loss surgery journey? fully supportive       ANTHROPOMETRICS:  Consult weight 1/9/23: 407 lbs with BMI 55.28    Estimated body mass index is 55.28 kg/m  as calculated from the " following:    Height as of 1/9/23: 1.829 m (6').    Weight as of 1/9/23: 184.9 kg (407 lb 9.6 oz).     Current weight: Did not update today    Goal weight for hip replacement per Nya Camarena's note: 330 lbs    No flowsheet data found.    Weight Loss Questions Reviewed With Patient 1/9/2023   How long have you been overweight? Since early childhood     MEDICATIONS FOR WEIGHT LOSS:  Wegovy prescribed 1/9/22    Hx   Saxenda -  Had been denied at first but was able to get with appeal per pt  Topiramate (ineffective)    SUPPLEMENT INFORMATION:  Did not discuss today    Labs 1/9/23  Vit D 32  PTH WNL  Lipids WNL aside from elevated LDL  A1C 5.6   WBC off - WBC high, RBC low, Hgb low, Hematocrit low    Hx of Vit D def    NUTRITION HISTORY:  Pt present today with his mom and legal guardian, Belen.     Patient is considering Bariatric surgery. Hoping to lose weight for hip replacement with goal of 330 lbs. Weight gain due to changes in diet and decrease in activity.     Wants to work on MWM first and consider surgical approach down the line as appropriate.     Worked with a RD a long time ago at [facility] for weight loss.     Mobility is very limited.   Moving into an apartment with a pool next month.     Per PA note (not yet finalized at time of writing this)    Eating 2 meals a day, with snacks. Trying to decrease pop. Meals are provided by group home. However, will go to the store to get candy, chip, cookies, crackers, ice cream, and pudding, and soda. Has increased hunger. Struggles with getting full. From exam, however Willard have increased hunger,   Breakfast - cereal with milk   Lunch - provided by group home  Dinner - pizza   Snacks - cookies, animal crackers   Pop - 1 liter of regular pop at a time.      Activity - Hip pain limits activity. Will try to get up and walk around as much as possible. Cannot walk more then 50 yards at a time.     Fluids: OJ, pop (rosmery jean baptiste, dr. Pepper, [place]) , powerade, crystal light, water,  smoothies  ? Currently drinking 1-2 24 oz bottles of pop/day per pt. Pt s mother thinks he may be drinking more than this  ? Open to considering diet pop instead of regular. Ideally want to switch to water/sparkling water long-term   ? Has a soda stream per pt s mom    Vegetables he likes: broccoli, celery, carrots, Brussel sprouts, asparagus   Likes baked beans    Alochol: rare    Nicotine: vapes     Feb 2023:  Patient moved into a new place since last seen.  Patient present today with Crys, one of his helpers.     Per Crys/pt recent foods have included:    Breakfast - oatmeal or oatmeal or pb toast or egg sausage sandwich  L - varies, example: salad  D - varies, homemade pizza, homemade chicken drumsticks, grilled cheese with side of chips  Snacks: yoplait with peaches, banana bread, oranges/apples    Patient and Crys wanted to go over meal ideas for Willard. Did not review last month's goals in detail. Will carry over to this month     Previous goals:  1. Consider tracking intake in Lose It or MyFitnessPal - Not yet, has been writing down food this week.  2. Aim for 1-2 pop/week and replace with water/low-sugar option  3. Buy 1 pop at a time as opposed to a 6 pack   4. Aim to replace ice cream with yogurt   5. Aim to keep fruit on hand (strawberries as an example)  6. Check in with ILS worker on foods to make/have in apartment once you move in next month. - Met    Additional information:  Disabled - lives at [facility] - moving Feb 6th to a crisis housing apartment, will have ILS staff     Single     Recall Diet Questions Reviewed With Patient 1/9/2023   Describe what you typically consume for lunch (typical or most recent): candy soda(8)cans to 10cans a day   Describe what you typically consume for supper (typical or most recent): pizza   How many ounces of water, or other low calorie drinks, do you drink daily (8 oz=1 glass)? 0 oz   How many ounces of caffeine (coffee, tea, pop) do  you drink daily (8 oz=1 glass)? 24 oz   How many ounces of milk do you drink daily (8 oz=1 glass) 0 oz   How often do you drink alcohol? Never       Eating Habits 1/9/2023   Do you have any dietary restrictions? No   Do you currently binge eat (eat a large amount of food in a short time)? Yes   Are you an emotional eater? Yes   Do you get up to eat after falling asleep? Yes       Dining Out History Reviewed With Patient 1/9/2023   How often do you dine out? Never.   Where do you dine out? (select all that apply) fast food chains   What types of food do you order when you dine out? hamburger         EXERCISE:       1/9/2023   How often do you exercise? Never   What keeps you from being more active?  Pain       NUTRITION DIAGNOSIS:  Obesity r/t long history of positive energy balance aeb BMI >30 kg/m2.    INTERVENTION:  Reviewed current dietary habits and pts history   Discussed long-term goals pt hopes to accomplish in RD appointments  Answered pt questions  Coordination of care   Nutrition education - Plate method, fats, low kcal beverages, starchy vs non-starchy vegetables, sources of carbohydrates, lean protein vs fattier proteins  AVS and handouts via [employer]      Questions Reviewed With Patient 1/9/2023   How ready are you to make changes regarding your weight? Number 1 = Not ready at all to make changes up to 10 = very ready. 1   How confident are you that you can change? 1 = Not confident that you will be successful making changes up to 10 = very confident. 1       Expected Engagement: fair-good (good engagement during appt however pt rated confidence as a 1 per questionnaire)     GOALS:  7. Consider tracking intake in Lose It or MyFitnessPal or journing intake.   8. Aim for 1-2 pop/week and replace with water/low-sugar option  9. Buy 1 pop at a time as opposed to a 6 pack   10. Aim to replace ice cream with yogurt   11. Aim to keep fruit on hand (strawberries as an example)    Meal ideas or components  discussed: honey mustard chicken, baked beans, asparagus, broccoli, chili with beans and beef, white chicken chili with onions/peppers, fish - walleye, salmon, etc., Barilla Protein + pasta, loaded baked potatoe, sweet potatoe breakfast skillet with eggs, tuna salad, tuna casserole       RESOURCES:     Plate method can be used for general guidance on balanced meals/portion sizes (see link below for picture/more information)                 Make 1/2 your plate non starchy vegetables (cauliflower, broccoli, asparagus, [place] sprouts, lettuce, carrots, for example).                5+ oz lean protein sources (salmon/skinless chicken/turkey breast/pork loin/lean cuts of beef/ or non-animal protein such as black, kidney or kuo beans, tofu/edamame/tempeh)     (Note: 3 oz = deck of cards size)                 ~1 cup carbohydrate choices such as whole grain starches or starchy vegetables or fruit. For example: quinoa, brown rice, barley, potatoes, sweet potatoes, winter squash, peas, corn, or fruit.               Choose ~0-2 added fat servings at a meal (avocados, nut butters, nuts or seeds, olive oil, vegetable oils).    The Plate Method:  https://www.cdc.gov/diabetes/images/managing/Diabetes-Manage-Eat-Well-Plate-Graphic_600px.jpg    Building a Plate  [email or web address]    Protein Sources for Weight Loss  [email or web address]     Carbohydrates  [email or web address]     Mindful Eating  [email or web address]     Summary of Volumetrics Eating Plan  [email or web address]     Seated Exercises for Arms and Legs (can be done before or after surgery)  [email or web address]    Follow up:    Thursday, March 23rd at 12:00    Time spent with patient: 25 minutes.  DHEERAJ Israel, RD, LD

## 2023-02-16 NOTE — PATIENT INSTRUCTIONS
GOALS:  Consider tracking intake in Lose It or MyFitnessPal or journing intake.   Aim for 1-2 pop/week and replace with water/low-sugar option  Buy 1 pop at a time as opposed to a 6 pack   Aim to replace ice cream with yogurt   Aim to keep fruit on hand (strawberries as an example)    Meal ideas or components discussed: honey mustard chicken, baked beans, asparagus, broccoli, chili with beans and beef, white chicken chili with onions/peppers, fish - walleye, salmon, etc., Barilla Protein + pasta, loaded baked potatoe, sweet potatoe breakfast skillet with eggs, tuna salad, tuna casserole       RESOURCES:     Plate method can be used for general guidance on balanced meals/portion sizes (see link below for picture/more information)                 Make 1/2 your plate non starchy vegetables (cauliflower, broccoli, asparagus, [place] sprouts, lettuce, carrots, for example).                5+ oz lean protein sources (salmon/skinless chicken/turkey breast/pork loin/lean cuts of beef/ or non-animal protein such as black, kidney or kuo beans, tofu/edamame/tempeh)     (Note: 3 oz = deck of cards size)                 ~1 cup carbohydrate choices such as whole grain starches or starchy vegetables or fruit. For example: quinoa, brown rice, barley, potatoes, sweet potatoes, winter squash, peas, corn, or fruit.               Choose ~0-2 added fat servings at a meal (avocados, nut butters, nuts or seeds, olive oil, vegetable oils).    The Plate Method:  https://www.cdc.gov/diabetes/images/managing/Diabetes-Manage-Eat-Well-Plate-Graphic_600px.jpg    Building a Plate  [email or web address]    Protein Sources for Weight Loss  [email or web address]     Carbohydrates  [email or web address]     Mindful Eating  [email or web address]     Summary of Volumetrics Eating Plan  [email or web address]     Seated Exercises for Arms and Legs (can be done before or after  surgery)  [email or web address]    Follow up:    Thursday, March 23rd at 12:00    DHEERAJ Chinchilla, RD, LD  Clinic #: [phone]

## 2023-02-21 ENCOUNTER — ALLIED HEALTH/NURSE VISIT (OUTPATIENT)
Dept: ENDOCRINOLOGY | Facility: CLINIC | Age: 43
End: 2023-02-21
Payer: COMMERCIAL

## 2023-02-21 DIAGNOSIS — E66.01 CLASS 3 SEVERE OBESITY DUE TO EXCESS CALORIES WITH SERIOUS COMORBIDITY AND BODY MASS INDEX (BMI) OF 50.0 TO 59.9 IN ADULT (H): Primary | ICD-10-CM

## 2023-02-21 DIAGNOSIS — E66.813 CLASS 3 SEVERE OBESITY DUE TO EXCESS CALORIES WITH SERIOUS COMORBIDITY AND BODY MASS INDEX (BMI) OF 50.0 TO 59.9 IN ADULT (H): Primary | ICD-10-CM

## 2023-02-21 PROCEDURE — 99207 PR NO CHARGE NURSE ONLY: CPT

## 2023-02-22 NOTE — PROGRESS NOTES
New Bariatric Patient Class    Kimo Greenwood attended the New Consult WLS class today.     Patient's current comorbidities include:  Patient Active Problem List   Diagnosis     Speech/language delay     Tobacco use disorder     Nocturnal enuresis     Moderate major depression (H)     LOREN (obstructive sleep apnea)     Essential hypertension     Class 3 severe obesity with serious comorbidity and body mass index (BMI) of 50.0 to 59.9 in adult (H)     Leukocytosis     Suicidal ideation     Chronic low back pain     Bilateral low back pain with left-sided sciatica     History of colonic polyps     Mild intellectual disability     S/P total hip arthroplasty     Vitamin D deficiency     LPRD (laryngopharyngeal reflux disease)     Patel's esophagus determined by biopsy     Mild intermittent asthma     Somatic dysfunction of lumbar region     Somatic dysfunction of sacral region     Sacral pain     Thoracic segment dysfunction     Hip pain, left     Duodenitis     Overactive bladder     Venous stasis dermatitis of both lower extremities     Severe recurrent major depressive disorder with psychotic features (H)     Alcohol use disorder, mild, abuse     Cannabis use disorder, mild, abuse     Mood change       Current Outpatient Medications   Medication Sig Dispense Refill     albuterol (VENTOLIN HFA) 108 (90 Base) MCG/ACT inhaler INHALE 2 PUFFS INTO LUNGS EVERY SIX HOURS AS NEEDED FOR SHORTNESS OF BREATH / DYSPNEA OR WHEEZING 18 g 1     calcium polycarbophil (FIBER-LAX) 625 MG tablet Take 2 tablets (1,250 mg) by mouth 2 times daily 360 tablet 0     DULoxetine (CYMBALTA) 60 MG capsule Take 120 mg by mouth daily        hydrochlorothiazide (HYDRODIURIL) 25 MG tablet Take 1 tablet (25 mg) by mouth daily 30 tablet 7     insulin pen needle (31G X 6 MM) 31G X 6 MM miscellaneous Use 1 pen needles daily or as directed. 100 each 0     Lidocaine (LIDOCARE) 4 % Patch Place 1-2 patches onto the skin every 24 hours To prevent  lidocaine toxicity, patient should be patch free for 12 hrs daily. 60 patch 1     lisinopril (ZESTRIL) 10 MG tablet Take 1 tablet (10 mg) by mouth every morning 30 tablet 7     mirabegron (MYRBETRIQ) 50 MG 24 hr tablet Take 1 tablet by mouth daily       naproxen sodium 220 MG capsule If Tylenol is not helpful, this can be added 2 tabs twice a day with meals. (Patient taking differently: Take 440 mg by mouth 2 times daily (with meals)) 60 capsule 3     OLANZapine-samidorphan (LYBALVI) 10-10 MG TABS tablet Take 1 tablet by mouth At Bedtime       order for DME Equipment being ordered: CPAP supplies 1 each 0     oxybutynin ER (DITROPAN XL) 15 MG 24 hr tablet Take 30 mg by mouth daily       pantoprazole (PROTONIX) 40 MG EC tablet TAKE 1 TABLET BY MOUTH ONCE DAILY 30-60 MINUTES BR FIRST MEAL OF THE DAY 30 tablet 7     prazosin (MINIPRESS) 1 MG capsule Take 3 mg by mouth At Bedtime       Semaglutide-Weight Management (WEGOVY) 1 MG/0.5ML SOAJ Inject 1 mg Subcutaneous once a week 2 mL 1     VITAMIN D3 50 MCG (2000 UT) tablet TAKE ONE TABLET BY MOUTH EVERY DAY 90 tablet 3       The following items were reviewed and questions answered.  1. Goal weight  2. Labs  3. Psych clearance  4. Specialty Clearances  5. Task list  6. Preop diet and exercise changes  7. Postop diet requirements  8. Postop medication requirements  9. Postop exercise  10. Postop lifetime behavior and diet changes    Patient will continue to meet with BRICE, Dietician and Surgeon as required preoperatively.    All questions answered.  Patient instructed to contact the office for any questions and to notify office of any updates/clearances completed.    Bariatric Task List    Fax:  Please fax all paperwork to: [phone] -     Status:  Is patient a candidate for bariatric surgery?:  patient is a candidate for bariatric surgery -     Cleared to schedule surgeon consult?:  cleared to schedule surgeon consult -     Status:  surgery evaluation in process -      Surgeon: Dr. Madrigal -     Tentative surgery month/year: TBD -        Insurance: Insurance:  [employer] -      Contact insurance to discuss coverage: Needed -       Cigna: PCP Recommendation and Medical Clearance:    -     HP Referral:    -      Advanced beneficiary notification (ABN) for Medicare patients for RD visits   and surgery:   -      Weight history:   -     Other:    -        Patient Info: Initial Weight:  407 -     Date of Initial Weight/Height:  1/9/2023 -     Goal Weight (lbs):  367 -     Required Weight Loss:  40 -     Surgery Type:  sleeve gastrectomy -     Multidisciplinary Meeting:    -        Dietician Visits: Structured weight loss required by insurance?:    -     Dietician Visit 1:  Needed - 1/10/23, Aydee Pozo [place]   Dietician Visit 2:  Needed - 2/8/23, Aydee Pozo [place]   Dietician Visit 3:  Needed -     Dietician Visit 4:    -     Dietician Visit 5:    -     Dietician Visit 6:    -     Dietician Visit additional:    - Monthly until surgery - [place]   Clearance from dietician to see surgeon?:    -     Dietician Notes:    -        Psychological Evaluation: Psych eval:  Needed - List and letter sent to patient, 1/12/23, [place]   Therapist letter of support:  Needed - Letter sent to patient, 1/12/23, [place]   Psychiatrist letter of support:  Needed - Letter sent to patient, 1/12/23, [place]   Establish care with therapist:    -     Complete eating disorder evaluation:    -     Letter of clearance from therapist/eating disorder program:    -     Other:    -        Lab Work: Complete Blood Count:  Completed -     Comprehensive Metabolic Panel:  Completed -     Vitamin D:  Completed -     PTH:  Completed -     Hgb A1c:  Completed -      Lipids: Completed -      TSH (UCARE, SCA, [place] MA):   -       Ferritin:   -       Folate:   -       Testosterone, Total and Free:   -     Thiamine:   -     Vitamin A:   -     Vitamin B12:   -     Zinc:   -     C-peptide:   -     H. pylori:    -     MRSA (2  "swabs, minimum 48 hours apart):   -     Nicotine Testing:    -     Recheck Vitamin D:   -     Other:    -        Consults/ Clearance Sleep Medicine:  Needed - Letter sent to patient, 1/12/23, [place]   Cardiac:    -     Pain:   -     Dental:    -     Endocrine:    -     Gastroenterology:    -     Vascular Medicine:    -     Hematology:  Needed -     Medical Weight Management:   -     Physical Therapy/Exercise:    -     Nephrology:    -     Neurology:    -     Pulmonology:    -     Rheumatology:    -     Other:    -     Other:    -     Other:    -        Testing: UGI:    -     EGD:  Needed -     Sleep Study:   -     Other:   -     Other:    -        PCP: Establish care with PCP:  Completed -     Follow up with PCP:    -     PCP letter of support:  Needed - Letter sent to patient, 1/12/23, [place]      Stopping Smoking/ Alcohol Use: Quit tobacco use (3 months smoke free)?:  Needed -     Quit date:    -     Quit alcohol use:   -     Quit date:   -     Other:   -     Quit date:   -        Patient Education:  Information Session:  Needed -     Given \"Making your decision\" handout?:  Yes -     Given \"A Roadmap to you Weight Loss Surgery\" handout?: Yes -     Given \"Get Well Loop\" information?: Yes -     Given support group information?:    -     Attended support group?:  Needed -     Support plan in place?:    -     Research consents signed?:    -     Avoid NSAIDS/ Alternate Plan for Pain:   -        Additional Surgery Requirements: Review Coag plan:    -     HgA1c <8:    -     Inpatient pain consult:    -     Final nicotine screen:    -     Dental work complete:    -     Birth control plan:    -     Gallstone prevention plan (Actigall for 6 months postop):   -     Other:   -     Other:   -        Final Tasks:  Before surgery online class:  Needed -     Before surgery online class website link:  [email or web address]   After surgery online class:  Needed -     After surgery online class website link:  " [email or web address]   Nurse visit per clinic:  Needed -     History and Physical per clinic:   -     Final labs per clinic: Needed -     Chest xray per clinic:   -     Electrocardiogram (ECG) per clinic:   -     Other:   -        Notes:   -

## 2023-02-28 ENCOUNTER — MYC MEDICAL ADVICE (OUTPATIENT)
Dept: FAMILY MEDICINE | Facility: CLINIC | Age: 43
End: 2023-02-28
Payer: COMMERCIAL

## 2023-03-01 ENCOUNTER — HOSPITAL ENCOUNTER (OUTPATIENT)
Dept: OCCUPATIONAL THERAPY | Facility: CLINIC | Age: 43
Setting detail: THERAPIES SERIES
Discharge: HOME | End: 2023-03-01
Attending: FAMILY MEDICINE
Payer: COMMERCIAL

## 2023-03-01 DIAGNOSIS — Z74.09 MOBILITY IMPAIRED: ICD-10-CM

## 2023-03-01 DIAGNOSIS — E66.01 MORBID OBESITY (H): ICD-10-CM

## 2023-03-01 PROCEDURE — 97542 WHEELCHAIR MNGMENT TRAINING: CPT | Mod: GO | Performed by: OCCUPATIONAL THERAPIST

## 2023-03-01 NOTE — PROGRESS NOTES
"   03/01/23 0900   General Information (PT: include personal factors and/or comorbidities that impact the POC; OT: include additional occupational profile info)   Rehab Discipline OT   Funding [employer] Enhanced/CADI   Service Outpatient;Occupational Therapy;Seating/Wheeled Mobility Evaluation   Height 5'11\"   Weight 407   Start Of Care Date 03/01/23   Referring Physician Kate Tanner   Orders Evaluate And Treat As Indicated;Per Therapist Evaluation   Orders Date 02/06/23   Others Present at Evaluation Mother   Patient/Caregiver Goals power mobility   Current Community Support Family/Friend Caregiver;Transportation Service;Meals On Wheels  (ILS 10 worker)   Patient role/Employment history Other/Comments  (vocational rehab)   Fall Risk Screen   Fall screen completed by OT   Have you fallen 2 or more times in the past year? No   Have you fallen and had an injury in the past year? No   Is patient a fall risk? Yes   Fall screen comments one fall in last year and fell on ice   Medical History   Onset Of Illness/injury Or Date Of Surgery 2/6/23  (order)   Medical Diagnosis Speech/language delay   Tobacco use disorder   Nocturnal enuresis   Moderate major depression (H)   LOREN (obstructive sleep apnea)   Essential hypertension   Class 3 severe obesity with serious comorbidity and body mass index (BMI) of 50.0 to 59.9 in adult (H)   Leukocytosis   Suicidal ideation   Chronic low back pain   Bilateral low back pain with left-sided sciatica   History of colonic polyps   Mild intellectual disability   S/P total hip arthroplasty   Vitamin D deficiency   LPRD (laryngopharyngeal reflux disease)   Patel's esophagus determined by biopsy   Mild intermittent asthma   Somatic dysfunction of lumbar region   Somatic dysfunction of sacral region   Sacral pain   Thoracic segment dysfunction   Hip pain, left   Duodenitis   Overactive bladder   Venous stasis dermatitis of both lower extremities   Severe recurrent major depressive disorder with " psychotic features (H)   Alcohol use disorder, mild, abuse   Cannabis use disorder, mild, abuse   Mood change   Recent or Planned Surgeries Need other hip replaced and knees   Home Accessibility   Living Environment Apartment/condo  (alone)   Primary Entrance Stairs  (6)   Community ADL   Transportation Car;Transportation Services   Ambulation   Ambulation Ambulatory   Ambulation Assist Independent   Ambulation Comments 10.43 seconds for 25 feet with no assistive device lateral sway due to to pain at knees and hips   Transfers   Transfer Assist Independent   Education Assessment   Barriers to Learning Emotional   Preferred Learning Style Listening;Pictures/Video   Assessment/Plan   Criteria for Skilled Interventions Met Yes, Treatment Indicated   Treatment Diagnosis Impaired participation with community mobility   Therapy Frequency Once   Planned Therapy Interventions Wheelchair Management/Propulsion Training   Planned Therapy Interventions Comments Patient has stairs to enter her apartment and does not always utilize accessible transportation.  He desires a wheelchair to get around more easily but does not necessarily have a plan for where he will use it and how to store it.  It is not a safe option for him to be lifting equipment up and down stairs.  Educated patient and mother on heavy-duty transport wheelchair, standard manual chair, and or scooter.  Also educated on need to obtain HD walker and utilize cane more often.  Recommend using CADI waiver for possible HD transport chair and HD walker and issued resources for these   Risks and benefits of treatment have been explained Yes   Patient/family & other staff in agreement with plan of care Yes   Session Time   OT Wheelchair Management Minutes (97493) 40   Adult OT Eval Goals   OT Eval Goals (Adult) 1    OT Goal 1   Goal Identifier Mobility assistance   Goal Description Patient/family demonstrates understanding of equipment for independent mobility, including  benefits/limitations;   Goal Progress Met today highly suggest patient start to utilize a walker before wheelchair   Target Date 03/01/23   Date Met 03/01/23     Electronically signed by:  Tayla MCKEON/ABE, ATP      Occupational Therapist, Assistive   [phone]      fax: [phone]      [email or web address]  [facility]- [facility], [address]  [place] [address]

## 2023-03-02 DIAGNOSIS — F33.9 RECURRENT MAJOR DEPRESSION (H): Primary | ICD-10-CM

## 2023-03-07 ENCOUNTER — LAB (OUTPATIENT)
Dept: LAB | Facility: CLINIC | Age: 43
End: 2023-03-07
Payer: COMMERCIAL

## 2023-03-07 DIAGNOSIS — F33.9 RECURRENT MAJOR DEPRESSION (H): ICD-10-CM

## 2023-03-07 DIAGNOSIS — Z79.899 OTHER LONG TERM (CURRENT) DRUG THERAPY: Primary | ICD-10-CM

## 2023-03-07 LAB
ALBUMIN SERPL BCG-MCNC: 4.1 G/DL (ref 3.5–5.2)
ALP SERPL-CCNC: 77 U/L (ref 40–129)
ALT SERPL W P-5'-P-CCNC: 25 U/L (ref 10–50)
ANION GAP SERPL CALCULATED.3IONS-SCNC: 11 MMOL/L (ref 7–15)
AST SERPL W P-5'-P-CCNC: 18 U/L (ref 10–50)
BILIRUB SERPL-MCNC: 0.3 MG/DL
BUN SERPL-MCNC: 15.8 MG/DL (ref 6–20)
CALCIUM SERPL-MCNC: 10 MG/DL (ref 8.6–10)
CHLORIDE SERPL-SCNC: 102 MMOL/L (ref 98–107)
CHOLEST SERPL-MCNC: 155 MG/DL
CREAT SERPL-MCNC: 1.08 MG/DL (ref 0.67–1.17)
DEPRECATED HCO3 PLAS-SCNC: 30 MMOL/L (ref 22–29)
GFR SERPL CREATININE-BSD FRML MDRD: 88 ML/MIN/1.73M2
GLUCOSE SERPL-MCNC: 92 MG/DL (ref 70–99)
HBA1C MFR BLD: 5.1 % (ref 0–5.6)
HDLC SERPL-MCNC: 39 MG/DL
LDLC SERPL CALC-MCNC: 101 MG/DL
NONHDLC SERPL-MCNC: 116 MG/DL
POTASSIUM SERPL-SCNC: 4.4 MMOL/L (ref 3.4–5.3)
PROT SERPL-MCNC: 7.3 G/DL (ref 6.4–8.3)
SODIUM SERPL-SCNC: 143 MMOL/L (ref 136–145)
TRIGL SERPL-MCNC: 76 MG/DL
VIT B12 SERPL-MCNC: 392 PG/ML (ref 232–1245)

## 2023-03-07 PROCEDURE — 82607 VITAMIN B-12: CPT

## 2023-03-07 PROCEDURE — 36415 COLL VENOUS BLD VENIPUNCTURE: CPT

## 2023-03-07 PROCEDURE — 83036 HEMOGLOBIN GLYCOSYLATED A1C: CPT

## 2023-03-07 PROCEDURE — 82306 VITAMIN D 25 HYDROXY: CPT

## 2023-03-07 PROCEDURE — 80061 LIPID PANEL: CPT

## 2023-03-07 PROCEDURE — 80053 COMPREHEN METABOLIC PANEL: CPT

## 2023-03-08 LAB — DEPRECATED CALCIDIOL+CALCIFEROL SERPL-MC: 36 UG/L (ref 20–75)

## 2023-03-09 ENCOUNTER — MYC REFILL (OUTPATIENT)
Dept: FAMILY MEDICINE | Facility: CLINIC | Age: 43
End: 2023-03-09
Payer: COMMERCIAL

## 2023-03-09 ENCOUNTER — TRANSFERRED RECORDS (OUTPATIENT)
Dept: HEALTH INFORMATION MANAGEMENT | Facility: CLINIC | Age: 43
End: 2023-03-09

## 2023-03-09 DIAGNOSIS — K59.01 SLOW TRANSIT CONSTIPATION: ICD-10-CM

## 2023-03-09 RX ORDER — CALCIUM POLYCARBOPHIL 625 MG/1
2 TABLET, FILM COATED ORAL 2 TIMES DAILY
Qty: 360 TABLET | Refills: 0 | Status: SHIPPED | OUTPATIENT
Start: 2023-03-09 | End: 2023-06-05

## 2023-03-09 NOTE — TELEPHONE ENCOUNTER
Prescription approved per [facility] Refill Protocol.  Pt is still taking this Rx.

## 2023-03-13 ENCOUNTER — TELEPHONE (OUTPATIENT)
Dept: ENDOCRINOLOGY | Facility: CLINIC | Age: 43
End: 2023-03-13

## 2023-03-13 ENCOUNTER — OFFICE VISIT (OUTPATIENT)
Dept: ENDOCRINOLOGY | Facility: CLINIC | Age: 43
End: 2023-03-13
Payer: COMMERCIAL

## 2023-03-13 VITALS
HEIGHT: 72 IN | HEART RATE: 80 BPM | WEIGHT: 315 LBS | TEMPERATURE: 98.8 F | BODY MASS INDEX: 42.66 KG/M2 | SYSTOLIC BLOOD PRESSURE: 113 MMHG | DIASTOLIC BLOOD PRESSURE: 72 MMHG | OXYGEN SATURATION: 99 %

## 2023-03-13 DIAGNOSIS — E66.01 CLASS 3 SEVERE OBESITY DUE TO EXCESS CALORIES WITH SERIOUS COMORBIDITY AND BODY MASS INDEX (BMI) OF 50.0 TO 59.9 IN ADULT (H): ICD-10-CM

## 2023-03-13 DIAGNOSIS — E66.813 CLASS 3 SEVERE OBESITY DUE TO EXCESS CALORIES WITH SERIOUS COMORBIDITY AND BODY MASS INDEX (BMI) OF 50.0 TO 59.9 IN ADULT (H): ICD-10-CM

## 2023-03-13 PROCEDURE — 99214 OFFICE O/P EST MOD 30 MIN: CPT

## 2023-03-13 RX ORDER — SEMAGLUTIDE 1 MG/.5ML
1 INJECTION, SOLUTION SUBCUTANEOUS WEEKLY
Qty: 2 ML | Refills: 3 | Status: SHIPPED | OUTPATIENT
Start: 2023-03-13 | End: 2023-06-02

## 2023-03-13 ASSESSMENT — PAIN SCALES - GENERAL: PAINLEVEL: EXTREME PAIN (8)

## 2023-03-13 NOTE — PROGRESS NOTES
Return Medical Weight Management Note     Kimo Greenwood  MRN:  [id number]  :  1980  SALEEM:  3/13/2023    Dear Ciro Love MD,    I had the pleasure of seeing your patient Kimo Greenwood. He is a 42 year old male who I am continuing to see for treatment of obesity related to:       2023   I have the following health issues associated with obesity: None of the above       Assessment & Plan   Problem List Items Addressed This Visit        Digestive    Class 3 severe obesity with serious comorbidity and body mass index (BMI) of 50.0 to 59.9 in adult (H)     Last seen 2023 for NBS. Since then has decided to continue with MWM. He is concerned with post op diet and not being able to drink carbonated beverages. Family is concerned that diet will cause increase in depression. Will continue with MWM now working towards goal weight of 330lbs for hip replacement.     Transitioned to Wegovy 1.0mg without concerns. No side effects. Has been helping with hunger and portion sizes. Has lost 6lbs since last visit. Will continue at this dose. Can discuss increasing at next visit.     Discussed soda consumption. Currently drinking 2-3 16oz bottles of regular pepsi. He does like the taste of diet and declined transitioning to diet options. Discussed decreasing to 1 bottle a day. Will have further discussion with  as they work on food plan and budget.          Relevant Medications    Semaglutide-Weight Management (WEGOVY) 1 MG/0.5ML pen      1. Continue Wegovy 1.0mg once weekly. Refill sent. Monitor fatigue symptoms.   2. Follow up with Nya Camarena in 2 months   3. Follow up with Aydee as needed       INTERVAL HISTORY:  First seen for NBS - 2023  Weight loss required for hip replacement <330lbs  Transitioned to Wegovy 1.0mg       Does not want to go through surgery at this time due to concerns for liquid diet and weight loss requirement. Family concern for it increasing in depression symptoms prior  to surgery with food restrictions.     Recently moved to his own apparment. Was accomined by  today.    Will continue with [facility] with goal for hip replaced <330lbs     Anti-obesity medications:     Current:   Wegovy 1.0mg - Has completed 5 weeks. No side effects. Has been helpful with hunger and portion sizes.       Recent diet changes: Decrease in portion sizes. Focusing on proteins. Snacking on almonds. Working on meal planning. Drinking 2-3 bottles of pepsi daily.      Recent exercise/activity changes: limited by hip pain.     Recent stressors: Stable     Recent sleep changes: Stable, sleeping better since moving to new place     Vitamins/Labs: Reviewed     CURRENT WEIGHT:   401 lbs 1.6 oz    Initial Weight (lbs): 407.6 lbs     Cumulative weight loss (lbs): 6.5  Weight Loss Percentage: 1.59%    Changes and Difficulties 3/11/2023   I have made the following changes to my diet since my last visit: Soda   With regards to my diet, I am still struggling with: giving up Sweets   I have made the following changes to my activity/exercise since my last visit: Walking   With regards to my activity/exercise, I am still struggling with: Being active due to needing hip replacement         MEDICATIONS:   Current Outpatient Medications   Medication Sig Dispense Refill     albuterol (VENTOLIN HFA) 108 (90 Base) MCG/ACT inhaler INHALE 2 PUFFS INTO LUNGS EVERY SIX HOURS AS NEEDED FOR SHORTNESS OF BREATH / DYSPNEA OR WHEEZING 18 g 1     calcium polycarbophil (FIBER-LAX) 625 MG tablet Take 2 tablets (1,250 mg) by mouth 2 times daily 360 tablet 0     DULoxetine (CYMBALTA) 60 MG capsule Take 120 mg by mouth daily        hydrochlorothiazide (HYDRODIURIL) 25 MG tablet Take 1 tablet (25 mg) by mouth daily 30 tablet 7     Lidocaine (LIDOCARE) 4 % Patch Place 1-2 patches onto the skin every 24 hours To prevent lidocaine toxicity, patient should be patch free for 12 hrs daily. 60 patch 1     lisinopril (ZESTRIL) 10 MG tablet Take  "1 tablet (10 mg) by mouth every morning 30 tablet 7     mirabegron (MYRBETRIQ) 50 MG 24 hr tablet Take 1 tablet by mouth daily       naproxen sodium 220 MG capsule If Tylenol is not helpful, this can be added 2 tabs twice a day with meals. (Patient taking differently: Take 440 mg by mouth 2 times daily (with meals)) 60 capsule 3     OLANZapine-samidorphan (LYBALVI) 10-10 MG TABS tablet Take 1 tablet by mouth At Bedtime       order for DME Equipment being ordered: CPAP supplies 1 each 0     oxybutynin ER (DITROPAN XL) 15 MG 24 hr tablet Take 30 mg by mouth daily       pantoprazole (PROTONIX) 40 MG EC tablet TAKE 1 TABLET BY MOUTH ONCE DAILY 30-60 MINUTES BR FIRST MEAL OF THE DAY 30 tablet 7     prazosin (MINIPRESS) 1 MG capsule Take 3 mg by mouth At Bedtime       Semaglutide-Weight Management (WEGOVY) 1 MG/0.5ML pen Inject 1 mg Subcutaneous once a week 2 mL 3     VITAMIN D3 50 MCG (2000 UT) tablet TAKE ONE TABLET BY MOUTH EVERY DAY 90 tablet 3       Weight Loss Medication History Reviewed With Patient 3/11/2023   Which weight loss medications are you currently taking on a regular basis?  Wegovy   Are you having any side effects from the weight loss medication that we have prescribed you? No             Objective    /72   Pulse 80   Temp 98.8  F (37.1  C) (Oral)   Ht 1.829 m (6' 0.01\")   Wt (!) 181.9 kg (401 lb 1.6 oz)   SpO2 99%   BMI 54.39 kg/m    PHYSICAL EXAM:  GENERAL: Healthy, alert and no distress  EYES: Eyes grossly normal to inspection.  No discharge or erythema, or obvious scleral/conjunctival abnormalities.  RESP: No audible wheeze, cough, or visible cyanosis.  No visible retractions or increased work of breathing.    SKIN: Visible skin clear. No significant rash, abnormal pigmentation or lesions.  NEURO: Cranial nerves grossly intact.  Mentation and speech appropriate for age.  PSYCH: Mentation appears normal, affect normal/bright, judgement and insight intact, normal speech and appearance " well-groomed.        Sincerely,    MONA ONOFRE PA-C      30 minutes spent on the date of the encounter doing chart review, history and exam, documentation and further activities per the note

## 2023-03-13 NOTE — PATIENT INSTRUCTIONS
"Thank you for allowing us the privilege of caring for you. We hope we provided you with the excellent service you deserve.   Please let us know if there is anything else we can do for you so that we can be sure you are completely satisfied with your care experience.    To ensure the quality of our services you may be receiving a patient satisfaction survey from an independent patient satisfaction monitoring company.    The greatest compliment you can give is a \"Likely to Recommend\"    Your visit was with MONA ONOFRE PA-C today.    Instructions per today's visit:     Robert Greenwood, it was great to visit with you today.  Here is a review of our visit.  If our clinic scheduler is not able to reach you please call [phone] to schedule your next appointments.    Continue Wegovy 1.0mg once weekly. Refill sent   Monitor Fatigue symptoms.   Follow up with Mona Camarena in 2 months   Follow up with Aydee as needed       Information about Video Visits with [facility]: video visit information  _________________________________________________________________________________________________________________________________________________________  If you are asked by your clinic team to have your blood pressure checked:  [facility] do offer several locations for blood pressure checks. Please follow the below link to schedule an appointment. Scheduling an appointment at the pharmacy for a blood pressure check is now preferred.    Appointment Plus (appointment-plus.[employer])  _________________________________________________________________________________________________________________________________________________________  Important contact and scheduling information:  Please call our contact center at [phone] to schedule your next appointments.  To find a lab location near you, please call [phone].  For any nursing questions or concerns call Niurka Haque LPN at [phone] or Denise Hubbard RN at " [phone]  Please call during clinic hours Monday through Friday 8:00a - 4:00p if you have questions or you can contact us via [employer] at anytime and we will reply during clinic hours.    Lab results will be communicated through My Chart or letter (if My Chart not used). Please call the clinic if you have not received communication after 1 week or if you have any questions.?  Clinic Fax: [phone]    _________________________________________________________________________________________________________________________________________________________  Meal Replacement Products:    Here is the link to our new e-store where you can purchase our meal replacement products    [employer] E-Store  Contextool/store    The one week starter kit is a great way to sample a variety of products and see what works for you.    If you want more information about the product go to: [email or web address][employer]Dom[employer] Safety    If you are an employee or [facility] or [employer] please contact your care team for a 10% estore discount    Free Shipping for orders over $75     Benefits of meal replacements products:    Portion and calorie control  Improved nutrition  Structured eating  Simplified food choices  Avoid contact with trigger foods  _________________________________________________________________________________________________________________________________________________________  Interested in working with a health ?  Health coaches work with you to improve your overall health and wellbeing.  They look at the whole person, and may involve discussion of different areas of life, including, but not limited to the four pillars of health (sleep, exercise, nutrition, and stress management). Discuss with your care team if you would like to start working a health .  Health Coaching-3 Pack: Schedule by calling [phone]    $99 for three health coaching visits    Visits may  be done in person or via phone    Coaching is a partnership between the  and the client; Coaches do not prescribe or diagnose    Coaching helps inspire the client to reach his/her personal goals   _________________________________________________________________________________________________________________________________________________________  24 Week Healthy Lifestyle Plan:    Our mission in the 24-week Healthy Lifestyle Plan is to provide you with individualized care by giving you the tools, education and support you need to lose weight and maintain a healthy lifestyle. In your 24-week journey, you ll be supported by a dedicated weight loss team that includes registered dietitians, medical weight management providers, health coaches, and nurses -- all with special expertise in weight loss -- to help you every step of the way.     Monthly meetings with your registered dietician or medical weight management provider help to review your progress, update your care plan, and make any adjustments needed to ensure success. Between these visits, weekly and bi-weekly health  visits will help you focus on the four pillars of weight loss -- stress, sleep, nutrition, and exercise -- and how you can best adapt each to achieve sustainable weight loss results.    In addition, you will be given exclusive access to online wellbeing classes through [employer].  Your initial visit will be with a medical weight management provider who will help to understand your weight loss goals and ensure this program is the right fit for you. Please let our team know if you are interested in the 24 week plan by sending a message to your care team or calling [phone] to schedule.  _________________________________________________________________________________________________________________________________________________________    COMPREHENSIVE WEIGHT MANAGEMENT PROGRAM  VIRTUAL SUPPORT GROUPS    For Support Group  "Information:      We offer support groups for patients who are working on weight loss and considering, preparing for or have had weight loss surgery.   There is no cost for this opportunity.  You are invited to attend the?Virtual Support Groups?provided by any of the following locations:    [facility] via Microsoft Teams with Gypsy Fermin RN  2.   [place] via Haodf.com with Rafat Ayoub, PhD, LP  3.   [place] via Haodf.com with Jacklyn Jones RN  4.   [facility] via [employer] Teams with Jacklyn Benedict [facility]    The following Support Group information can also be found on our website:  [email or web address]    [facility] Weight Loss Surgery Support Group    [facility] Weight Loss Surgery Support Group  The support group is a patient-lead forum that meets monthly to share experiences, encouragement and education. It is open to those who have had weight loss surgery, are scheduled for surgery, and those who are considering surgery.   WHEN: This group meets on the 3rd Wednesday of each month from 5:00PM - 6:00PM virtually using Microsoft Teams.   FACILITATOR: Led by Gypsy Fermin RD, LD, RN, the program's Clinical Coordinator.   TO REGISTER: Please contact the clinic via [employer] or call the nurse line directly at [phone] to inform our staff that you would like an invite sent to you and to let us know the email you would like the invite sent to. Prior to the meeting, a link with directions on how to join the meeting will be sent to you.    2022 Meetings  Susan 15: \"Let's Talk\" a time for the group to share.  July 20: \"Let's Talk\" a time for the group to share.  August 17: \"Let's Talk\" a time for the group to share.  September 21: \"Let's Talk\" a time for the group to share.  October 19: Guest Speaker: Dr Don Munguia MD Pulmonologist and Sleep Medicine Physician, \"Getting a Good Night's Sleep\".  November 16: \"Let's " "Talk\" a time for the group to share.  December 21: \"Let's Talk\" a time for the group to share.    [facility] Support Groups    Connections: Bariatric Care Support Group?  This is open to all [facility] (and those external to this program) pre- and post- operative bariatric surgery patients as well as their support system.   WHEN: This group meets the 2nd Tuesday of each month from 6:30 PM - 8:00 PM virtually using Microsoft Teams.   FACILITATOR: Led by Rafat Ayoub, Ph.D who is a Licensed Psychologist with the [facility] Comprehensive Weight Management Program.   TO REGISTER: Please send an email to Rafat Ayoub, Ph.D., LP at?[email or web address]?if you would like an invitation to the group and to learn about using Microsoft Teams.    2022 Meetings  June 14: Nicol Mari RD, LD at [facility], \"Nutritional Labeling\"  July 12 August 2 (Please Note Date Change)  September 13 October 11 November 8 December 13    Connections: Post-Operative Bariatric Surgery Support Group  This is a support group for [facility] bariatric patients (and those external to [facility]) who have had bariatric surgery and are at least 3 months post-surgery.  WHEN: This support group meets the 4th Wednesday of the month from 11:00 AM - 12:00 PM virtually using Microsoft Teams.   FACILITATOR: Led by Certified Bariatric Nurse, Jacklyn Jones RN.   TO REGISTER: Please send an email to Jacklyn at [email or web address] if you would like an invitation to the group and to learn about using Microsoft Teams.    2022 Meetings June 22 July 27 August 24 September 28 October 26 November 23 December 28      [facility] Healthy Lifestyle Virtual Support Group    Healthy Lifestyle Virtual Support Group?  This is 60 minutes of small group guided discussion, support and resources. All are welcome who want a healthy " "lifestyle.  WHEN: This group meets monthly on a Friday from 12:30 PM - 1:30 PM virtually using Microsoft Teams.   FACILITATOR: Led by National Board Certified Health and , Jacklyn Benedict [facility].   TO REGISTER: Please send an email to Jacklyn [email or web address] to receive monthly invites to the group or if you have any questions about having a health .  Prior to the meeting, a link with directions on how to join the meeting will be sent to you.    2022 Meetings  June 24: Jacklyn Benedict [facility], \"Setting Limits and Boundaries\".  Jul 29: Open Forum  August 26: Guest Speaker: Krys Wells Registered Dietitian  September 30: Open Forum  October 28th: Guest Speaker: Gaby Kern [facility], Health , \"Gratitude Practices\".  November 18: Guest Speaker: Aydee Bar RD Registered Dietitian, \"Navigating How to Eat around the Holidays\".  December 16: Guest Speaker: Shannon Mendoza [facility], \"Changing Your Relationship with Movement\".    ____________________________________________________________________________________________________________________________________________________________________________  [place] [facility] Get Moving Program  Our team of physical therapists is trained to help you understand and take control of your condition. They will perform a thorough evaluation to determine your ability for activity and develop a customized plan to fit your goals and physical ability.  Scheduling: Unsure if the Get Moving program is right for you? Discuss the program with your medical provider or diabetes educator. You can also call us at [phone] to ask questions or schedule an appointment.   [facility] Get Moving Program  ____________________________________________________________________________________________________________________________________________________________________________  [facility] Diabetes Prevention Program (DPP)  If you have prediabetes and Medicare " please contact us via [employer] to learn more about the Diabetes Prevention Program (DPP)  Program Details:  [facility] offers the year-long Diabetes Prevention Program (DPP). The program helps you to make lifestyle changes that prevent or delay type 2 diabetes by supporting healthy eating, increased physical activity, stress reduction and use of coping skills.   On average, previous [facility] DPP cohorts have lost and maintained at least 5% of their starting weight throughout the program and averaged more than 150 minutes of physical activity per week.  Participants meet weekly for one-hour group sessions over sixteen weeks, every other week for the next 8 weeks, and monthly for the last six months.   A year-long maintenance program is also available for participants who complete the first year.   Location & Cost:   During the COVID-19 Public Health Emergency, the program is offered virtually. When in-person classes can resume, they will be held at [facility].  For people with Medicare, the program is covered in full. A self-pay option will also be available for those with non-Medicare insurance plans.   _________________________________________________________________________________________________________________________________________________________  Bluetooth Scale:    We hope to provide you with high quality virtual healthcare visits while social distancing for COVID-19 is necessary, as well as in the future when virtual visits may be more convenient for you.     Our technology team made it possible for Bluetooth scales to send weight measurements to our electronic medical record. This allows weights from you weighing at home to securely flow into the medical record, which will improve telephone and virtual visits.   Additionally, studies have shown that adults actually lose more weight when their weights are automatically sent to someone else, and also that  this process is not stressful for those adults.    Below is a link for purchasing the scale, with a discount code for our patients. You may call your insurance company to see if they will reimburse you for the cost of the scale, as a piece of durable medical equipment. The scales only go up to a weight of 400 pounds. This is an issue and we are working with the developer on increasing this. We found no scales that go over 400lb that have blue-tooth for connecting to [place] [employer] [place].    Scale to purchase: the [employer]  Body  Scale: [email or web address]=aw.ds    Discount Code: We have a discount code for our patients to bring the cost down to $50, Discount code is: [place]_Scale_20%off  _______________________________________________________________________________________________________________________________________________________________________________    To work with a Behavioral Health Psychologist:    Call to schedule:    Brandon Lino - [phone]  Jerrell Alfredo - [phone]  Chen Dawkins - [phone]  Dania Martino - [phone]   Tasha Rucker PhD (cannot accept Medicare) [phone]        Thank you,   [facility] Comprehensive Weight Management Team

## 2023-03-13 NOTE — NURSING NOTE
Chief Complaint   Patient presents with     RECHECK     2 month f/up       Vitals:    03/13/23 1309   BP: 113/72   Pulse: 80   Temp: 98.8  F (37.1  C)   TempSrc: Oral   SpO2: 99%   Weight: (!) 181.9 kg (401 lb 1.6 oz)       Body mass index is 54.4 kg/m .          KIRK BENEDICT EMT

## 2023-03-13 NOTE — LETTER
3/13/2023       RE: Kimo Greenwood   [address]  [address]  [place] [address]     Dear Colleague,    Thank you for referring your patient, Kimo Greenwood, to the [facility] WEIGHT MANAGEMENT CLINIC [place] at [facility]. Please see a copy of my visit note below.      Return Medical Weight Management Note     Kimo Greenwood  MRN:  [id number]  :  1980  SALEEM:  3/13/2023    Dear Ciro Love MD,    I had the pleasure of seeing your patient Kimo Greenwood. He is a 42 year old male who I am continuing to see for treatment of obesity related to:       2023   I have the following health issues associated with obesity: None of the above       Assessment & Plan   Problem List Items Addressed This Visit        Digestive    Class 3 severe obesity with serious comorbidity and body mass index (BMI) of 50.0 to 59.9 in adult (H)     Last seen 2023 for NBS. Since then has decided to continue with [facility]. He is concerned with post op diet and not being able to drink carbonated beverages. Family is concerned that diet will cause increase in depression. Will continue with MW now working towards goal weight of 330lbs for hip replacement.     Transitioned to Wegovy 1.0mg without concerns. No side effects. Has been helping with hunger and portion sizes. Has lost 6lbs since last visit. Will continue at this dose. Can discuss increasing at next visit.     Discussed soda consumption. Currently drinking 2-3 16oz bottles of regular pepsi. He does like the taste of diet and declined transitioning to diet options. Discussed decreasing to 1 bottle a day. Will have further discussion with  as they work on food plan and budget.          Relevant Medications    Semaglutide-Weight Management (WEGOVY) 1 MG/0.5ML pen      1. Continue Wegovy 1.0mg once weekly. Refill sent. Monitor fatigue symptoms.   2. Follow up with Nya Camarena in 2 months    3. Follow up with Aydee as needed       INTERVAL HISTORY:  First seen for NBS - 1/9/2023  Weight loss required for hip replacement <330lbs  Transitioned to Wegovy 1.0mg       Does not want to go through surgery at this time due to concerns for liquid diet and weight loss requirement. Family concern for it increasing in depression symptoms prior to surgery with food restrictions.     Recently moved to his own apparment. Was accomined by  today.    Will continue with [facility] with goal for hip replaced <330lbs     Anti-obesity medications:     Current:   Wegovy 1.0mg - Has completed 5 weeks. No side effects. Has been helpful with hunger and portion sizes.       Recent diet changes: Decrease in portion sizes. Focusing on proteins. Snacking on almonds. Working on meal planning. Drinking 2-3 bottles of pepsi daily.      Recent exercise/activity changes: limited by hip pain.     Recent stressors: Stable     Recent sleep changes: Stable, sleeping better since moving to new place     Vitamins/Labs: Reviewed     CURRENT WEIGHT:   401 lbs 1.6 oz    Initial Weight (lbs): 407.6 lbs     Cumulative weight loss (lbs): 6.5  Weight Loss Percentage: 1.59%    Changes and Difficulties 3/11/2023   I have made the following changes to my diet since my last visit: Soda   With regards to my diet, I am still struggling with: giving up Sweets   I have made the following changes to my activity/exercise since my last visit: Walking   With regards to my activity/exercise, I am still struggling with: Being active due to needing hip replacement         MEDICATIONS:   Current Outpatient Medications   Medication Sig Dispense Refill     albuterol (VENTOLIN HFA) 108 (90 Base) MCG/ACT inhaler INHALE 2 PUFFS INTO LUNGS EVERY SIX HOURS AS NEEDED FOR SHORTNESS OF BREATH / DYSPNEA OR WHEEZING 18 g 1     calcium polycarbophil (FIBER-LAX) 625 MG tablet Take 2 tablets (1,250 mg) by mouth 2 times daily 360 tablet 0     DULoxetine (CYMBALTA) 60 MG  "capsule Take 120 mg by mouth daily        hydrochlorothiazide (HYDRODIURIL) 25 MG tablet Take 1 tablet (25 mg) by mouth daily 30 tablet 7     Lidocaine (LIDOCARE) 4 % Patch Place 1-2 patches onto the skin every 24 hours To prevent lidocaine toxicity, patient should be patch free for 12 hrs daily. 60 patch 1     lisinopril (ZESTRIL) 10 MG tablet Take 1 tablet (10 mg) by mouth every morning 30 tablet 7     mirabegron (MYRBETRIQ) 50 MG 24 hr tablet Take 1 tablet by mouth daily       naproxen sodium 220 MG capsule If Tylenol is not helpful, this can be added 2 tabs twice a day with meals. (Patient taking differently: Take 440 mg by mouth 2 times daily (with meals)) 60 capsule 3     OLANZapine-samidorphan (LYBALVI) 10-10 MG TABS tablet Take 1 tablet by mouth At Bedtime       order for DME Equipment being ordered: CPAP supplies 1 each 0     oxybutynin ER (DITROPAN XL) 15 MG 24 hr tablet Take 30 mg by mouth daily       pantoprazole (PROTONIX) 40 MG EC tablet TAKE 1 TABLET BY MOUTH ONCE DAILY 30-60 MINUTES BR FIRST MEAL OF THE DAY 30 tablet 7     prazosin (MINIPRESS) 1 MG capsule Take 3 mg by mouth At Bedtime       Semaglutide-Weight Management (WEGOVY) 1 MG/0.5ML pen Inject 1 mg Subcutaneous once a week 2 mL 3     VITAMIN D3 50 MCG (2000 UT) tablet TAKE ONE TABLET BY MOUTH EVERY DAY 90 tablet 3       Weight Loss Medication History Reviewed With Patient 3/11/2023   Which weight loss medications are you currently taking on a regular basis?  Wegovy   Are you having any side effects from the weight loss medication that we have prescribed you? No             Objective     /72   Pulse 80   Temp 98.8  F (37.1  C) (Oral)   Ht 1.829 m (6' 0.01\")   Wt (!) 181.9 kg (401 lb 1.6 oz)   SpO2 99%   BMI 54.39 kg/m    PHYSICAL EXAM:  GENERAL: Healthy, alert and no distress  EYES: Eyes grossly normal to inspection.  No discharge or erythema, or obvious scleral/conjunctival abnormalities.  RESP: No audible wheeze, cough, or " visible cyanosis.  No visible retractions or increased work of breathing.    SKIN: Visible skin clear. No significant rash, abnormal pigmentation or lesions.  NEURO: Cranial nerves grossly intact.  Mentation and speech appropriate for age.  PSYCH: Mentation appears normal, affect normal/bright, judgement and insight intact, normal speech and appearance well-groomed.        Sincerely,    MONA ONOFRE PA-C      30 minutes spent on the date of the encounter doing chart review, history and exam, documentation and further activities per the note

## 2023-03-14 NOTE — ASSESSMENT & PLAN NOTE
Last seen 1/9/2023 for NBS. Since then has decided to continue with MW. He is concerned with post op diet and not being able to drink carbonated beverages. Family is concerned that diet will cause increase in depression. Will continue with MW now working towards goal weight of 330lbs for hip replacement.     Transitioned to Wegovy 1.0mg without concerns. No side effects. Has been helping with hunger and portion sizes. Has lost 6lbs since last visit. Will continue at this dose. Can discuss increasing at next visit.     Discussed soda consumption. Currently drinking 2-3 16oz bottles of regular pepsi. He does like the taste of diet and declined transitioning to diet options. Discussed decreasing to 1 bottle a day. Will have further discussion with  as they work on food plan and budget.

## 2023-03-22 ENCOUNTER — OFFICE VISIT (OUTPATIENT)
Dept: INTERNAL MEDICINE | Facility: CLINIC | Age: 43
End: 2023-03-22
Payer: COMMERCIAL

## 2023-03-22 VITALS
BODY MASS INDEX: 42.66 KG/M2 | DIASTOLIC BLOOD PRESSURE: 70 MMHG | OXYGEN SATURATION: 97 % | TEMPERATURE: 97.3 F | HEIGHT: 72 IN | SYSTOLIC BLOOD PRESSURE: 116 MMHG | RESPIRATION RATE: 17 BRPM | WEIGHT: 315 LBS | HEART RATE: 100 BPM

## 2023-03-22 DIAGNOSIS — R82.998 GREEN-COLORED URINE: ICD-10-CM

## 2023-03-22 DIAGNOSIS — R30.0 DYSURIA: Primary | ICD-10-CM

## 2023-03-22 DIAGNOSIS — R10.11 ABDOMINAL PAIN, RIGHT UPPER QUADRANT: ICD-10-CM

## 2023-03-22 LAB
ALBUMIN UR-MCNC: NEGATIVE MG/DL
APPEARANCE UR: CLEAR
BILIRUB UR QL STRIP: NEGATIVE
COLOR UR AUTO: YELLOW
GLUCOSE UR STRIP-MCNC: NEGATIVE MG/DL
HGB UR QL STRIP: NEGATIVE
KETONES UR STRIP-MCNC: ABNORMAL MG/DL
LEUKOCYTE ESTERASE UR QL STRIP: NEGATIVE
NITRATE UR QL: NEGATIVE
PH UR STRIP: 6.5 [PH] (ref 5–8)
SP GR UR STRIP: 1.02 (ref 1–1.03)
UROBILINOGEN UR STRIP-ACNC: 0.2 E.U./DL

## 2023-03-22 PROCEDURE — 99214 OFFICE O/P EST MOD 30 MIN: CPT | Performed by: INTERNAL MEDICINE

## 2023-03-22 PROCEDURE — 81003 URINALYSIS AUTO W/O SCOPE: CPT | Performed by: INTERNAL MEDICINE

## 2023-03-22 ASSESSMENT — PATIENT HEALTH QUESTIONNAIRE - PHQ9
SUM OF ALL RESPONSES TO PHQ QUESTIONS 1-9: 0
SUM OF ALL RESPONSES TO PHQ QUESTIONS 1-9: 0

## 2023-03-22 NOTE — PROGRESS NOTES
"  Assessment & Plan     Dysuria  For about 3 days, has pain with urination and color of the urine looks green. He is not taking any new supplements, amitriptyline, did not have propofol recently. No hematuria, fever, chills, flank pain.  We will check the urine for UTI.  He will increase fluid intake. We discussed that some supplements and food like asparagus can change the smell and color of the urine.  - UA Macro with Reflex to Micro and Culture - lab collect; Future    Green-colored urine    - UA Macro with Reflex to Micro and Culture - lab collect; Future    Abdominal pain, right upper quadrant  For 3-5 days has pain in the right mid abdomen only when he coughs. He has chronic cough with h/o vaping. No shortness of breath, hemoptysis. No abdominal pain with movements, rotating the bed, sitting and walking.  He is seeing Oncology for chronic leukocytosis, Chronic anemia and intermittent neutrophilia. 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 He is scheduled for CT abdomen and pelvis on 3/31/23.    The scan will provide the more answers about the pain reported today. On the exam, no CVA tenderness on percussion, lungs auscultatory normal, abdomen no tenderness on the pressure.               Nicotine/Tobacco Cessation:  He reports that he has been smoking vaping device and cigarettes. He has a 1.00 pack-year smoking history. His smokeless tobacco use includes chew.  Nicotine/Tobacco Cessation Plan:         BMI:   Estimated body mass index is 55.03 kg/m  as calculated from the following:    Height as of this encounter: 1.829 m (6' 0.01\").    Weight as of this encounter: 184.1 kg (405 lb 14.4 oz).           Emma Childers MD  [facility]    Carline Lamar is a 42 year old, presenting for the following " "health issues:  Pain (Sharp Pain R Side And When He Coughs x 4 Days-Possible UTI Urine Is Coming Out Green And Is Painful x 5 Days)    Additional Questions 3/22/2023   Roomed by Nancy Maurer    History of Present Illness       Reason for visit:  Side pain with cough, possible UTI concern  Symptom onset:  1-3 days ago  Symptoms include:  Side pain - right, green urine, pain on rinating  Symptom intensity:  Moderate  Had these symptoms before:  No  What makes it worse:  No  What makes it better:  No    He eats 2-3 servings of fruits and vegetables daily.He consumes 3 sweetened beverage(s) daily.He exercises with enough effort to increase his heart rate 9 or less minutes per day.  He exercises with enough effort to increase his heart rate 3 or less days per week.   He is taking medications regularly.    Today's PHQ-9         PHQ-9 Total Score: 0    PHQ-9 Q9 Thoughts of better off dead/self-harm past 2 weeks :   Not at all                   Review of Systems         Objective    /70   Pulse 100   Temp 97.3  F (36.3  C)   Resp 17   Ht 1.829 m (6' 0.01\")   Wt (!) 184.1 kg (405 lb 14.4 oz)   SpO2 97%   BMI 55.03 kg/m    Body mass index is 55.03 kg/m .  Physical Exam                       "

## 2023-03-23 ENCOUNTER — ANCILLARY PROCEDURE (OUTPATIENT)
Dept: CT IMAGING | Facility: CLINIC | Age: 43
End: 2023-03-23
Attending: SURGERY
Payer: COMMERCIAL

## 2023-03-23 ENCOUNTER — TELEPHONE (OUTPATIENT)
Dept: ENDOCRINOLOGY | Facility: CLINIC | Age: 43
End: 2023-03-23

## 2023-03-23 DIAGNOSIS — R19.00 RETROPERITONEAL MASS: ICD-10-CM

## 2023-03-23 PROCEDURE — 74177 CT ABD & PELVIS W/CONTRAST: CPT | Performed by: RADIOLOGY

## 2023-03-23 RX ORDER — IOPAMIDOL 755 MG/ML
135 INJECTION, SOLUTION INTRAVASCULAR ONCE
Status: COMPLETED | OUTPATIENT
Start: 2023-03-23 | End: 2023-03-23

## 2023-03-23 RX ADMIN — IOPAMIDOL 135 ML: 755 INJECTION, SOLUTION INTRAVASCULAR at 14:18

## 2023-03-28 ENCOUNTER — TELEPHONE (OUTPATIENT)
Dept: FAMILY MEDICINE | Facility: CLINIC | Age: 43
End: 2023-03-28
Payer: COMMERCIAL

## 2023-03-28 NOTE — TELEPHONE ENCOUNTER
Received form from [facility] stating insurance requiring chart notes to cover wheelchair includin) Pt has mobility limitation that significantly impairs ability to participate in one or more mobility related ADL.    2) Pt mobility limitation cannot be sufficiently resolved by use of cane or walker.    3) Pt is willing to use wheelchair and is able to use it safely within home.    4) Pt has upper extremity function and can self-propel    5) Pt home provides adequate space to maneuver throughout.    6) Use of wheelchair will significantly improve ability to complete ADLs.    Attempted to reach Belen to schedule OV with Dr. Love, VM is full. MyChart sent.    Judy ALEXANDER RN

## 2023-03-29 ENCOUNTER — HOSPITAL ENCOUNTER (OUTPATIENT)
Dept: GENERAL RADIOLOGY | Facility: CLINIC | Age: 43
Discharge: HOME | End: 2023-03-29
Attending: PHYSICIAN ASSISTANT | Admitting: PHYSICIAN ASSISTANT
Payer: COMMERCIAL

## 2023-03-29 DIAGNOSIS — M16.9 HIP OSTEOARTHRITIS: ICD-10-CM

## 2023-03-29 PROCEDURE — 250N000009 HC RX 250

## 2023-03-29 PROCEDURE — 77002 NEEDLE LOCALIZATION BY XRAY: CPT

## 2023-03-29 PROCEDURE — 255N000002 HC RX 255 OP 636: Performed by: PHYSICIAN ASSISTANT

## 2023-03-29 PROCEDURE — 250N000011 HC RX IP 250 OP 636

## 2023-03-29 RX ORDER — BUPIVACAINE HYDROCHLORIDE 5 MG/ML
INJECTION, SOLUTION EPIDURAL; INTRACAUDAL
Status: COMPLETED
Start: 2023-03-29 | End: 2023-03-29

## 2023-03-29 RX ORDER — IOPAMIDOL 408 MG/ML
10 INJECTION, SOLUTION INTRATHECAL ONCE
Status: COMPLETED | OUTPATIENT
Start: 2023-03-29 | End: 2023-03-29

## 2023-03-29 RX ORDER — TRIAMCINOLONE ACETONIDE 40 MG/ML
INJECTION, SUSPENSION INTRA-ARTICULAR; INTRAMUSCULAR
Status: COMPLETED
Start: 2023-03-29 | End: 2023-03-29

## 2023-03-29 RX ORDER — LIDOCAINE HYDROCHLORIDE 10 MG/ML
INJECTION, SOLUTION EPIDURAL; INFILTRATION; INTRACAUDAL; PERINEURAL
Status: COMPLETED
Start: 2023-03-29 | End: 2023-03-29

## 2023-03-29 RX ADMIN — BUPIVACAINE HYDROCHLORIDE 50 MG: 5 INJECTION, SOLUTION EPIDURAL; INTRACAUDAL; PERINEURAL at 15:15

## 2023-03-29 RX ADMIN — TRIAMCINOLONE ACETONIDE 40 MG: 40 INJECTION, SUSPENSION INTRA-ARTICULAR; INTRAMUSCULAR at 15:15

## 2023-03-29 RX ADMIN — IOPAMIDOL 1 ML: 408 INJECTION, SOLUTION INTRATHECAL at 15:15

## 2023-03-29 RX ADMIN — LIDOCAINE HYDROCHLORIDE 10 MG: 10 INJECTION, SOLUTION EPIDURAL; INFILTRATION; INTRACAUDAL; PERINEURAL at 15:15

## 2023-03-29 NOTE — PROGRESS NOTES
RADIOLOGY PROCEDURE NOTE  Patient name: Kimo Greenwood  MRN: [id number]  : 1980    Pre-procedure diagnosis: Right hp pain  Post-procedure diagnosis: Same    Procedure Date/Time: 2023  3:32 PM  Procedure: Right hip joint steroid injection  Estimated blood loss: None  Specimen(s) collected with description: none  The patient tolerated the procedure well with no immediate complications.  Significant findings:none    See imaging dictation for procedural details.    Provider name: Alberto Rodriguez PA-C  Assistant(s):None

## 2023-03-29 NOTE — PROGRESS NOTES
Pt was in Radiology today for a Right hip steroid injection. Consent by pt and guardian Dalia (Mom) via telephone Pt tolerated ortho procedure well. Pre procedure pain was 1-/10 post procedure pain level 2/10.Procedure was completed by TERRANCE EDWARDS. There were no complications during this procedure. Pt verbalized understanding of written and verbal instructions and left department in stable and satisfactory condition with staff from hospitals. There is no evidence of bleeding or any other complications upon discharge.

## 2023-03-30 ENCOUNTER — VIRTUAL VISIT (OUTPATIENT)
Dept: ENDOCRINOLOGY | Facility: CLINIC | Age: 43
End: 2023-03-30
Payer: COMMERCIAL

## 2023-03-30 ENCOUNTER — OFFICE VISIT (OUTPATIENT)
Dept: FAMILY MEDICINE | Facility: CLINIC | Age: 43
End: 2023-03-30
Payer: COMMERCIAL

## 2023-03-30 VITALS
HEIGHT: 72 IN | WEIGHT: 315 LBS | RESPIRATION RATE: 16 BRPM | HEART RATE: 87 BPM | OXYGEN SATURATION: 98 % | BODY MASS INDEX: 42.66 KG/M2 | SYSTOLIC BLOOD PRESSURE: 136 MMHG | DIASTOLIC BLOOD PRESSURE: 66 MMHG | TEMPERATURE: 98 F

## 2023-03-30 DIAGNOSIS — R06.09 DYSPNEA ON EXERTION: ICD-10-CM

## 2023-03-30 DIAGNOSIS — E66.813 CLASS 3 SEVERE OBESITY DUE TO EXCESS CALORIES WITH SERIOUS COMORBIDITY AND BODY MASS INDEX (BMI) OF 50.0 TO 59.9 IN ADULT (H): ICD-10-CM

## 2023-03-30 DIAGNOSIS — E66.01 CLASS 3 SEVERE OBESITY DUE TO EXCESS CALORIES WITH SERIOUS COMORBIDITY AND BODY MASS INDEX (BMI) OF 50.0 TO 59.9 IN ADULT (H): ICD-10-CM

## 2023-03-30 DIAGNOSIS — Z71.3 NUTRITIONAL COUNSELING: Primary | ICD-10-CM

## 2023-03-30 DIAGNOSIS — M16.10 HIP ARTHRITIS: ICD-10-CM

## 2023-03-30 DIAGNOSIS — I45.10 RBBB: Primary | ICD-10-CM

## 2023-03-30 LAB
ERYTHROCYTE [DISTWIDTH] IN BLOOD BY AUTOMATED COUNT: 12.9 % (ref 10–15)
HCT VFR BLD AUTO: 38.9 % (ref 40–53)
HGB BLD-MCNC: 13.4 G/DL (ref 13.3–17.7)
MCH RBC QN AUTO: 29.1 PG (ref 26.5–33)
MCHC RBC AUTO-ENTMCNC: 34.4 G/DL (ref 31.5–36.5)
MCV RBC AUTO: 85 FL (ref 78–100)
PLATELET # BLD AUTO: 264 10E3/UL (ref 150–450)
RBC # BLD AUTO: 4.6 10E6/UL (ref 4.4–5.9)
TSH SERPL DL<=0.005 MIU/L-ACNC: 1.09 UIU/ML (ref 0.3–4.2)
WBC # BLD AUTO: 17.6 10E3/UL (ref 4–11)

## 2023-03-30 PROCEDURE — 97803 MED NUTRITION INDIV SUBSEQ: CPT | Mod: VID | Performed by: DIETITIAN, REGISTERED

## 2023-03-30 PROCEDURE — 99207 PR NO CHARGE LOS: CPT | Mod: VID | Performed by: DIETITIAN, REGISTERED

## 2023-03-30 PROCEDURE — 84443 ASSAY THYROID STIM HORMONE: CPT | Performed by: FAMILY MEDICINE

## 2023-03-30 PROCEDURE — 93000 ELECTROCARDIOGRAM COMPLETE: CPT | Performed by: FAMILY MEDICINE

## 2023-03-30 PROCEDURE — 85027 COMPLETE CBC AUTOMATED: CPT | Performed by: FAMILY MEDICINE

## 2023-03-30 PROCEDURE — 99214 OFFICE O/P EST MOD 30 MIN: CPT | Performed by: FAMILY MEDICINE

## 2023-03-30 PROCEDURE — 36415 COLL VENOUS BLD VENIPUNCTURE: CPT | Performed by: FAMILY MEDICINE

## 2023-03-30 ASSESSMENT — ANXIETY QUESTIONNAIRES
GAD7 TOTAL SCORE: 4
3. WORRYING TOO MUCH ABOUT DIFFERENT THINGS: SEVERAL DAYS
GAD7 TOTAL SCORE: 4
IF YOU CHECKED OFF ANY PROBLEMS ON THIS QUESTIONNAIRE, HOW DIFFICULT HAVE THESE PROBLEMS MADE IT FOR YOU TO DO YOUR WORK, TAKE CARE OF THINGS AT HOME, OR GET ALONG WITH OTHER PEOPLE: SOMEWHAT DIFFICULT
GAD7 TOTAL SCORE: 4
1. FEELING NERVOUS, ANXIOUS, OR ON EDGE: SEVERAL DAYS
4. TROUBLE RELAXING: NOT AT ALL
5. BEING SO RESTLESS THAT IT IS HARD TO SIT STILL: NOT AT ALL
8. IF YOU CHECKED OFF ANY PROBLEMS, HOW DIFFICULT HAVE THESE MADE IT FOR YOU TO DO YOUR WORK, TAKE CARE OF THINGS AT HOME, OR GET ALONG WITH OTHER PEOPLE?: SOMEWHAT DIFFICULT
2. NOT BEING ABLE TO STOP OR CONTROL WORRYING: SEVERAL DAYS
7. FEELING AFRAID AS IF SOMETHING AWFUL MIGHT HAPPEN: SEVERAL DAYS
6. BECOMING EASILY ANNOYED OR IRRITABLE: NOT AT ALL
7. FEELING AFRAID AS IF SOMETHING AWFUL MIGHT HAPPEN: SEVERAL DAYS

## 2023-03-30 ASSESSMENT — ASTHMA QUESTIONNAIRES
QUESTION_4 LAST FOUR WEEKS HOW OFTEN HAVE YOU USED YOUR RESCUE INHALER OR NEBULIZER MEDICATION (SUCH AS ALBUTEROL): NOT AT ALL
QUESTION_5 LAST FOUR WEEKS HOW WOULD YOU RATE YOUR ASTHMA CONTROL: SOMEWHAT CONTROLLED
HOSPITALIZATION_OVERNIGHT_LAST_YEAR_TOTAL: ONE
EMERGENCY_ROOM_LAST_YEAR_TOTAL: THREE OR MORE
QUESTION_1 LAST FOUR WEEKS HOW MUCH OF THE TIME DID YOUR ASTHMA KEEP YOU FROM GETTING AS MUCH DONE AT WORK, SCHOOL OR AT HOME: A LITTLE OF THE TIME
ACT_TOTALSCORE: 21
QUESTION_2 LAST FOUR WEEKS HOW OFTEN HAVE YOU HAD SHORTNESS OF BREATH: ONCE OR TWICE A WEEK
QUESTION_3 LAST FOUR WEEKS HOW OFTEN DID YOUR ASTHMA SYMPTOMS (WHEEZING, COUGHING, SHORTNESS OF BREATH, CHEST TIGHTNESS OR PAIN) WAKE YOU UP AT NIGHT OR EARLIER THAN USUAL IN THE MORNING: NOT AT ALL
ACT_TOTALSCORE: 21

## 2023-03-30 ASSESSMENT — PATIENT HEALTH QUESTIONNAIRE - PHQ9
SUM OF ALL RESPONSES TO PHQ QUESTIONS 1-9: 8
10. IF YOU CHECKED OFF ANY PROBLEMS, HOW DIFFICULT HAVE THESE PROBLEMS MADE IT FOR YOU TO DO YOUR WORK, TAKE CARE OF THINGS AT HOME, OR GET ALONG WITH OTHER PEOPLE: SOMEWHAT DIFFICULT
SUM OF ALL RESPONSES TO PHQ QUESTIONS 1-9: 8

## 2023-03-30 ASSESSMENT — ENCOUNTER SYMPTOMS: SHORTNESS OF BREATH: 1

## 2023-03-30 NOTE — PROGRESS NOTES
"Kimo Greenwood is a 42 year old male who is being evaluated via a billable video visit.      The patient has been notified of following:     \"This video visit will be conducted via a call between you and your physician/provider. We have found that certain health care needs can be provided without the need for an in-person physical exam.  This service lets us provide the care you need with a video conversation.  If a prescription is necessary we can send it directly to your pharmacy.  If lab work is needed we can place an order for that and you can then stop by our lab to have the test done at a later time.    Video visits are billed at different rates depending on your insurance coverage.  Please reach out to your insurance provider with any questions.    If during the course of the call the physician/provider feels a video visit is not appropriate, you will not be charged for this service.\"    Patient has given verbal consent for Video visit? Yes  How would you like to obtain your AVS? MyChart  If you are dropped from the video visit, the video invite should be resent to: Text to cell phone: [phone]  Will anyone else be joining your video visit?  DERRICK Spangler worker  {If patient encounters technical issues they should call [phone]      Video-Visit Details    Type of service:  Video Visit    Video Start Time: 2:35 pm  Video End Time: 3:05 pm    Originating Location (pt. Location): Home    Distant Location (provider location):  Offsite (providers home)     Platform used for Video Visit: Pivot3    During this virtual visit the patient is located in [place], patient verifies this as the location during the entirety of this visit.     Nutrition Consultation Note    Reason For Visit: Nutrition Assessment    Kimo Greenwood is a 42 year old presenting today for return nutrition consult.   Pt is interested in laparoscopic sleeve gastrectomy.  Will proceed with MWM at this time per pt/his mother.     Patient is " "accompanied by his mother, Belen.      This is pt's 3rd required nutrition visits prior to surgery. Did NOT review the surgical information at this time - focusing on weight loss first.    Pt referred by JERRY Marinelli on January 10, 2023.    Coordination note (if pursuing surgery in future)   Needs baseline labs - Done 1/9/23  Needs Psych eval  Needs PCP letter of support  40 lb weight loss from initial  Surgeon meet and greet with Dr. Madrigal  Needs letter of support from therapist  Needs letter of support from psychiatrist       CO-MORBIDITIES OF OBESITY INCLUDE:       1/9/2023   I have the following health issues associated with obesity: None of the above     PMH:     Per PA note:  \"Hip OA - needing hip replacement.      LOREN - uses CPAP every night. 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 Well controlled on PPI.      HTN - well controlled on medications      Degenerative disk disease - causes low back pain. 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 Followed by urology\"    Depression - has therapist/psych, hx of suicide attempt per questionnaire     Speech/Language delay    SUPPORT:  Support System Reviewed With Patient 1/9/2023   Who do you have in your support network that can be available to help you for the first 2 weeks after surgery? agency   Who can you count on for support throughout your weight loss surgery journey? fully supportive       ANTHROPOMETRICS:  Consult weight 1/9/23: 407 lbs with BMI 55.28    Estimated body mass index is 54.25 kg/m  as calculated from the following:    Height as of an earlier encounter on 3/30/23: 1.829 m (6').    Weight as of an earlier encounter on 3/30/23: 181.4 kg (400 lb).     Current weight: 400 lbs, pt report    Goal weight for hip replacement per Nya Camarena's note: 330 lbs    No flowsheet data found.    Weight Loss Questions Reviewed With Patient 1/9/2023   How long have you been overweight? Since early " childhood     MEDICATIONS FOR WEIGHT LOSS:  Wegovy prescribed 1/9/22    Hx   Saxenda -  Had been denied at first but was able to get with appeal per pt  Topiramate (ineffective)    SUPPLEMENT INFORMATION:  Vitamin D3 5,000 international unit(s)/day  B complex    Labs 1/9/23  Vit D 32  PTH WNL  Lipids WNL aside from elevated LDL  A1C 5.6   WBC off - WBC high, RBC low, Hgb low, Hematocrit low    Hx of Vit D def    NUTRITION HISTORY:  Pt present today with his mom and legal guardian, Belen.     Patient is considering Bariatric surgery. Hoping to lose weight for hip replacement with goal of 330 lbs. Weight gain due to changes in diet and decrease in activity.     Wants to work on MWM first and consider surgical approach down the line as appropriate.     Worked with a RD a long time ago at [facility] for weight loss.     Mobility is very limited.   Moving into an apartment with a pool next month.     Per PA note (not yet finalized at time of writing this)    Eating 2 meals a day, with snacks. Trying to decrease pop. Meals are provided by group home. However, will go to the store to get candy, chip, cookies, crackers, ice cream, and pudding, and soda. Has increased hunger. Struggles with getting full. From exam, however Willard have increased hunger,   Breakfast - cereal with milk   Lunch - provided by group home  Dinner - pizza   Snacks - cookies, animal crackers   Pop - 1 liter of regular pop at a time.      Activity - Hip pain limits activity. Will try to get up and walk around as much as possible. Cannot walk more then 50 yards at a time.     Fluids: OJ, pop (rosmery jean baptiste, dr. Pepper, [facility]) , powerade, crystal light, water, smoothies  ? Currently drinking 1-2 24 oz bottles of pop/day per pt. Pt s mother thinks he may be drinking more than this  ? Open to considering diet pop instead of regular. Ideally want to switch to water/sparkling water long-term   ? Has a soda stream per pt s mom    Vegetables he likes: broccoli, celery,  carrots, Brussel sprouts, asparagus   Likes baked beans    Alochol: rare    Nicotine: vapes     Feb 2023:  Patient moved into a new place since last seen.  Patient present today with Crys, one of his helpers.     Per Crys/pt recent foods have included:    Breakfast - oatmeal or oatmeal or pb toast or egg sausage sandwich  L - varies, example: salad  D - varies, homemade pizza, homemade chicken drumsticks, grilled cheese with side of chips  Snacks: yoplait with peaches, banana bread, oranges/apples    Patient and Crys wanted to go over meal ideas for Willard. Did not review last month's goals in detail. Will carry over to this month     March 2023:    Pt reports making some dietary changes such as trying to do skinny cow ice cream instead of regular.     Struggling with cravings. Hard to stick to buying 1 pop as opposed to a case. Feels he is addicted.     Occ getting busy and not eating enough - today did not eat for first time until about 1 pm. Spent session today discussing food ideas for on the go and nutritious alternatives to foods he likes.    Previous goals:  1. Consider tracking intake in Lose It or MyFitnessPal or journing intake. - Not yet, was going to start with Arms worker but has not yet.   2. Aim for 1-2 pop/week and replace with water/low-sugar option - In progress  3. Buy 1 pop at a time as opposed to a 6 pack   4. Aim to replace ice cream with yogurt - Not yet but has been trying to make more nutritious choices. Did try skinny cow   5. Aim to keep fruit on hand (strawberries as an example) - Plans to make fruit smoothies    Additional information:  Disabled - lives at [facility] - moving Feb 6th to a crisis housing apartment, will have ILS staff     Single     Recall Diet Questions Reviewed With Patient 1/9/2023   Describe what you typically consume for lunch (typical or most recent): candy soda(8)cans to 10cans a day   Describe what you typically consume for supper  (typical or most recent): pizza   How many ounces of water, or other low calorie drinks, do you drink daily (8 oz=1 glass)? 0 oz   How many ounces of caffeine (coffee, tea, pop) do you drink daily (8 oz=1 glass)? 24 oz   How many ounces of milk do you drink daily (8 oz=1 glass) 0 oz   How often do you drink alcohol? Never       Eating Habits 1/9/2023   Do you have any dietary restrictions? No   Do you currently binge eat (eat a large amount of food in a short time)? Yes   Are you an emotional eater? Yes   Do you get up to eat after falling asleep? Yes       Dining Out History Reviewed With Patient 1/9/2023   How often do you dine out? Never.   Where do you dine out? (select all that apply) fast food chains   What types of food do you order when you dine out? hamburger       EXERCISE:       1/9/2023   How often do you exercise? Never   What keeps you from being more active? Pain       NUTRITION DIAGNOSIS:  Obesity r/t long history of positive energy balance aeb BMI >30 kg/m2.    INTERVENTION:  Reviewed current dietary habits and pts history   Discussed long-term goals pt hopes to accomplish in RD appointments  Answered pt questions  Coordination of care   Nutrition education - Plate method, fats, low kcal beverages, starchy vs non-starchy vegetables, sources of carbohydrates, lean protein vs fattier proteins  AVS and handouts via [employer]      Questions Reviewed With Patient 1/9/2023   How ready are you to make changes regarding your weight? Number 1 = Not ready at all to make changes up to 10 = very ready. 1   How confident are you that you can change? 1 = Not confident that you will be successful making changes up to 10 = very confident. 1       Expected Engagement: fair-good (good engagement during appt however pt rated confidence as a 1 per questionnaire)     GOALS:  1. Check in with Geanine on tracking intake.   2. Aim to buy 1 pop at a time as opposed to 6 packs  3. Consider sorbet, frozen yogurt or other ice  cream products (skinny cow, halo) or smaller portion of ice cream   4. Aim to make some smoothies with Jose    Ideas to bring on the go:   - Nutrigrain bars   - Almonds   - Tuna    - Snack packs   - Dried fruit (try to find no sugar added)   - Yogurt   - Yogurt covered nuts       Meal ideas or components discussed: honey mustard chicken, baked beans, asparagus, broccoli, chili with beans and beef, white chicken chili with onions/peppers, fish - walleye, salmon, etc., Barilla Protein + pasta, loaded baked potatoe, sweet potatoe breakfast skillet with eggs, tuna salad, tuna casserole       RESOURCES:     Plate method can be used for general guidance on balanced meals/portion sizes (see link below for picture/more information)                 Make 1/2 your plate non starchy vegetables (cauliflower, broccoli, asparagus, [place] sprouts, lettuce, carrots, for example).                5+ oz lean protein sources (salmon/skinless chicken/turkey breast/pork loin/lean cuts of beef/ or non-animal protein such as black, kidney or kuo beans, tofu/edamame/tempeh)     (Note: 3 oz = deck of cards size)                 ~1 cup carbohydrate choices such as whole grain starches or starchy vegetables or fruit. For example: quinoa, brown rice, barley, potatoes, sweet potatoes, winter squash, peas, corn, or fruit.               Choose ~0-2 added fat servings at a meal (avocados, nut butters, nuts or seeds, olive oil, vegetable oils).    The Plate Method:  https://www.cdc.gov/diabetes/images/managing/Diabetes-Manage-Eat-Well-Plate-Graphic_600px.jpg    Building a Plate  [email or web address]    Protein Sources for Weight Loss  [email or web address]     Carbohydrates  [email or web address]     Mindful Eating  [email or web address]     Summary of Volumetrics Eating Plan  [email or web address]     Seated Exercises for Arms and Legs (can be done before or after  surgery)  [email or web address]    Follow up:    Thursday, April 27th at 1:00 pm     Time spent with patient: 30 minutes.  DHEERAJ Israel, RD, LD

## 2023-03-30 NOTE — PROGRESS NOTES
Assessment & Plan     RBBB    Patient has a new right bundle branch block found incidentally during EMS visit 2 days ago for exertional dyspnea evaluation; again these findings were confirmed on EKG today.  He has a known history of pericardial effusion which I was previously following repeat echocardiograms.  He is currently not having any anginal equivalent symptoms and I do not suspect cardiac tamponade, however I do want to check the labs as per below to rule out hypothyroidism and get him set up with cardiology for further evaluation and management.  - EKG 12-lead complete w/read - Clinics  - CBC with platelets  - TSH with free T4 reflex  - Adult Cardiology Eval  Referral  - CBC with platelets  - TSH with free T4 reflex    Dyspnea on exertion  As per above.     Hip arthritis  I believe patient would benefit from a wheelchair due to the following conditions:    1) Pt has mobility limitation that significantly impairs ability to participate in one or more mobility related ADL.     2) Pt mobility limitation cannot be sufficiently resolved by use of cane or walker.     3) Pt is willing to use wheelchair and is able to use it safely within home.     4) Pt has upper extremity function and can self-propel     5) Pt home provides adequate space to maneuver throughout.     6) Use of wheelchair will significantly improve ability to complete ADLs.          Ciro Love MD  [facility]          Carline Lamar is a 42 year old, presenting for the following health issues:  Orders (For a wheel chair)    Additional Questions 3/22/2023   Roomed by Nancy CASTELLANOS     Patient is a 42-year-old male who presents for follow-up of exertional dyspnea and for difficulty with ambulation.    Seen by EMS 2 days ago for shortness of breath, had an EKG performed and was informed to follow-up with his primary.    Continues to have difficulty with ambulation due to his pain syndrome.       Review of Systems    Respiratory: Positive for shortness of breath.    Cardiovascular: Negative for chest pain.            Objective    /66   Pulse 87   Temp 98  F (36.7  C) (Oral)   Resp 16   Ht 1.829 m (6')   Wt (!) 181.4 kg (400 lb)   SpO2 98%   BMI 54.25 kg/m    Body mass index is 54.25 kg/m .  Physical Exam  Cardiovascular:      Rate and Rhythm: Normal rate and regular rhythm.   Pulmonary:      Effort: Pulmonary effort is normal.      Breath sounds: Normal breath sounds.   Neurological:      Comments: Antalgic gait   Psychiatric:         Mood and Affect: Mood normal.         Behavior: Behavior normal.       EKG, normal sinus rhythm ventricular rate 86 bpm, right bundle branch block, no acute ST or T wave change.  Poor R wave progression.  Compared to previous EKGs the right bundle branch block is new                Answers for HPI/ROS submitted by the patient on 3/30/2023  If you checked off any problems, how difficult have these problems made it for you to do your work, take care of things at home, or get along with other people?: Somewhat difficult  PHQ9 TOTAL SCORE: 8  ANDRES 7 TOTAL SCORE: 4

## 2023-03-30 NOTE — PROGRESS NOTES
{PROVIDER CHARTING PREFERENCE:821174}    Subjective   Willard is a 42 year old, presenting for the following health issues:  No chief complaint on file.    Additional Questions 3/22/2023   Roomed by Nancy CASTELLANOS     Notes needed for wheel chair.      {additonal problems for provider to add (Optional):120781}      Review of Systems   {ROS COMP (Optional):963389}      Objective    There were no vitals taken for this visit.  There is no height or weight on file to calculate BMI.  Physical Exam   {Exam List (Optional):533841}    {Diagnostic Test Results (Optional):981570}    {AMBULATORY ATTESTATION (Optional):869909}

## 2023-03-30 NOTE — LETTER
"3/30/2023       RE: Kimo Greenwood  [address]  [address]  [facility] [address]     Dear Colleague,    Thank you for referring your patient, Kimo Greenwood, to the [facility] [place] [facility]. Please see a copy of my visit note below.    Kimo Greenwood is a 42 year old male who is being evaluated via a billable video visit.      The patient has been notified of following:     \"This video visit will be conducted via a call between you and your physician/provider. We have found that certain health care needs can be provided without the need for an in-person physical exam.  This service lets us provide the care you need with a video conversation.  If a prescription is necessary we can send it directly to your pharmacy.  If lab work is needed we can place an order for that and you can then stop by our lab to have the test done at a later time.    Video visits are billed at different rates depending on your insurance coverage.  Please reach out to your insurance provider with any questions.    If during the course of the call the physician/provider feels a video visit is not appropriate, you will not be charged for this service.\"    Patient has given verbal consent for Video visit? Yes  How would you like to obtain your AVS? MyChart  If you are dropped from the video visit, the video invite should be resent to: Text to cell phone: [phone]  Will anyone else be joining your video visit?  DERRICK Spangler worker  {If patient encounters technical issues they should call [phone]      Video-Visit Details    Type of service:  Video Visit    Video Start Time: 2:35 pm  Video End Time: 3:05 pm    Originating Location (pt. Location): Home    Distant Location (provider location):  Offsite (providers home)     Platform used for Video Visit: Rodger    During this virtual visit the patient is located in [place], patient verifies this " "as the location during the entirety of this visit.     Nutrition Consultation Note    Reason For Visit: Nutrition Assessment    Kimo Greenwood is a 42 year old presenting today for return nutrition consult.   Pt is interested in laparoscopic sleeve gastrectomy.  Will proceed with MWM at this time per pt/his mother.     Patient is accompanied by his mother, Belen.      This is pt's 3rd required nutrition visits prior to surgery. Did NOT review the surgical information at this time - focusing on weight loss first.    Pt referred by JERRY Marinelli on January 10, 2023.    Coordination note (if pursuing surgery in future)   Needs baseline labs - Done 1/9/23  Needs Psych eval  Needs PCP letter of support  40 lb weight loss from initial  Surgeon meet and greet with Dr. Madrigal  Needs letter of support from therapist  Needs letter of support from psychiatrist       CO-MORBIDITIES OF OBESITY INCLUDE:       1/9/2023   I have the following health issues associated with obesity: None of the above     PMH:     Per PA note:  \"Hip OA - needing hip replacement.      LOREN - uses CPAP every night. Followed by PCP      Barretts esophagus - has not had any GERD symptoms recently. Well controlled on PPI.      HTN - well controlled on medications      Degenerative disk disease - causes low back pain. 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 Followed by urology\"    Depression - has therapist/psych, hx of suicide attempt per questionnaire     Speech/Language delay    SUPPORT:  Support System Reviewed With Patient 1/9/2023   Who do you have in your support network that can be available to help you for the first 2 weeks after surgery? agency   Who can you count on for support throughout your weight loss surgery journey? fully supportive       ANTHROPOMETRICS:  Consult weight 1/9/23: 407 lbs with BMI 55.28    Estimated body mass index is 54.25 kg/m  as calculated from the following:    Height as of an earlier encounter " on 3/30/23: 1.829 m (6').    Weight as of an earlier encounter on 3/30/23: 181.4 kg (400 lb).     Current weight: 400 lbs, pt report    Goal weight for hip replacement per Nya Camarena's note: 330 lbs    No flowsheet data found.    Weight Loss Questions Reviewed With Patient 1/9/2023   How long have you been overweight? Since early childhood     MEDICATIONS FOR WEIGHT LOSS:  Wegovy prescribed 1/9/22    Hx   Saxenda -  Had been denied at first but was able to get with appeal per pt  Topiramate (ineffective)    SUPPLEMENT INFORMATION:  Vitamin D3 5,000 international unit(s)/day  B complex    Labs 1/9/23  Vit D 32  PTH WNL  Lipids WNL aside from elevated LDL  A1C 5.6   WBC off - WBC high, RBC low, Hgb low, Hematocrit low    Hx of Vit D def    NUTRITION HISTORY:  Pt present today with his mom and legal guardian, Belen.     Patient is considering Bariatric surgery. Hoping to lose weight for hip replacement with goal of 330 lbs. Weight gain due to changes in diet and decrease in activity.     Wants to work on MWM first and consider surgical approach down the line as appropriate.     Worked with a RD a long time ago at [facility] for weight loss.     Mobility is very limited.   Moving into an apartment with a pool next month.     Per PA note (not yet finalized at time of writing this)    Eating 2 meals a day, with snacks. Trying to decrease pop. Meals are provided by group home. However, will go to the store to get candy, chip, cookies, crackers, ice cream, and pudding, and soda. Has increased hunger. Struggles with getting full. From exam, however Willard have increased hunger,   Breakfast - cereal with milk   Lunch - provided by group home  Dinner - pizza   Snacks - cookies, animal crackers   Pop - 1 liter of regular pop at a time.      Activity - Hip pain limits activity. Will try to get up and walk around as much as possible. Cannot walk more then 50 yards at a time.     Fluids: OJ, pop (rosmery jean baptiste, dr. Pepper, Tamica) , powerade,  crystal light, water, smoothies  ? Currently drinking 1-2 24 oz bottles of pop/day per pt. Pt s mother thinks he may be drinking more than this  ? Open to considering diet pop instead of regular. Ideally want to switch to water/sparkling water long-term   ? Has a soda stream per pt s mom    Vegetables he likes: broccoli, celery, carrots, Brussel sprouts, asparagus   Likes baked beans    Alochol: rare    Nicotine: vapes     Feb 2023:  Patient moved into a new place since last seen.  Patient present today with Crys, one of his helpers.     Per Crys/pt recent foods have included:    Breakfast - oatmeal or oatmeal or pb toast or egg sausage sandwich  L - varies, example: salad  D - varies, homemade pizza, homemade chicken drumsticks, grilled cheese with side of chips  Snacks: yoplait with peaches, banana bread, oranges/apples    Patient and Crys wanted to go over meal ideas for Willard. Did not review last month's goals in detail. Will carry over to this month     March 2023:    Pt reports making some dietary changes such as trying to do skinny cow ice cream instead of regular.     Struggling with cravings. Hard to stick to buying 1 pop as opposed to a case. Feels he is addicted.     Occ getting busy and not eating enough - today did not eat for first time until about 1 pm. Spent session today discussing food ideas for on the go and nutritious alternatives to foods he likes.    Previous goals:  1. Consider tracking intake in Lose It or MyFitnessPal or journing intake. - Not yet, was going to start with Arms worker but has not yet.   2. Aim for 1-2 pop/week and replace with water/low-sugar option - In progress  3. Buy 1 pop at a time as opposed to a 6 pack   4. Aim to replace ice cream with yogurt - Not yet but has been trying to make more nutritious choices. Did try skinny cow   5. Aim to keep fruit on hand (strawberries as an example) - Plans to make fruit smoothies    Additional information:  Disabled - lives at  [facility] - moving Feb 6th to a crisis housing apartment, will have ILS staff     Single     Recall Diet Questions Reviewed With Patient 1/9/2023   Describe what you typically consume for lunch (typical or most recent): candy soda(8)cans to 10cans a day   Describe what you typically consume for supper (typical or most recent): pizza   How many ounces of water, or other low calorie drinks, do you drink daily (8 oz=1 glass)? 0 oz   How many ounces of caffeine (coffee, tea, pop) do you drink daily (8 oz=1 glass)? 24 oz   How many ounces of milk do you drink daily (8 oz=1 glass) 0 oz   How often do you drink alcohol? Never       Eating Habits 1/9/2023   Do you have any dietary restrictions? No   Do you currently binge eat (eat a large amount of food in a short time)? Yes   Are you an emotional eater? Yes   Do you get up to eat after falling asleep? Yes       Dining Out History Reviewed With Patient 1/9/2023   How often do you dine out? Never.   Where do you dine out? (select all that apply) fast food chains   What types of food do you order when you dine out? hamburger       EXERCISE:       1/9/2023   How often do you exercise? Never   What keeps you from being more active? Pain       NUTRITION DIAGNOSIS:  Obesity r/t long history of positive energy balance aeb BMI >30 kg/m2.    INTERVENTION:  Reviewed current dietary habits and pts history   Discussed long-term goals pt hopes to accomplish in RD appointments  Answered pt questions  Coordination of care   Nutrition education - Plate method, fats, low kcal beverages, starchy vs non-starchy vegetables, sources of carbohydrates, lean protein vs fattier proteins  AVS and handouts via mediaBunkert      Questions Reviewed With Patient 1/9/2023   How ready are you to make changes regarding your weight? Number 1 = Not ready at all to make changes up to 10 = very ready. 1   How confident are you that you can change? 1 = Not confident that you will be  successful making changes up to 10 = very confident. 1       Expected Engagement: fair-good (good engagement during appt however pt rated confidence as a 1 per questionnaire)     GOALS:  1. Check in with Geanine on tracking intake.   2. Aim to buy 1 pop at a time as opposed to 6 packs  3. Consider sorbet, frozen yogurt or other ice cream products (skinny cow, halo) or smaller portion of ice cream   4. Aim to make some smoothies with Jose    Ideas to bring on the go:   - Nutrigrain bars   - Almonds   - Tuna    - Snack packs   - Dried fruit (try to find no sugar added)   - Yogurt   - Yogurt covered nuts       Meal ideas or components discussed: honey mustard chicken, baked beans, asparagus, broccoli, chili with beans and beef, white chicken chili with onions/peppers, fish - walleye, salmon, etc., Barilla Protein + pasta, loaded baked potatoe, sweet potatoe breakfast skillet with eggs, tuna salad, tuna casserole       RESOURCES:     Plate method can be used for general guidance on balanced meals/portion sizes (see link below for picture/more information)                 Make 1/2 your plate non starchy vegetables (cauliflower, broccoli, asparagus, [place] sprouts, lettuce, carrots, for example).                5+ oz lean protein sources (salmon/skinless chicken/turkey breast/pork loin/lean cuts of beef/ or non-animal protein such as black, kidney or kuo beans, tofu/edamame/tempeh)     (Note: 3 oz = deck of cards size)                 ~1 cup carbohydrate choices such as whole grain starches or starchy vegetables or fruit. For example: quinoa, brown rice, barley, potatoes, sweet potatoes, winter squash, peas, corn, or fruit.               Choose ~0-2 added fat servings at a meal (avocados, nut butters, nuts or seeds, olive oil, vegetable oils).    The Plate Method:  https://www.cdc.gov/diabetes/images/managing/Diabetes-Manage-Eat-Well-Plate-Graphic_600px.jpg    Building a  Plate  [email or web address]    Protein Sources for Weight Loss  [email or web address]     Carbohydrates  [email or web address]     Mindful Eating  [email or web address]     Summary of Volumetrics Eating Plan  [email or web address]     Seated Exercises for Arms and Legs (can be done before or after surgery)  [email or web address]    Follow up:    Thursday, April 27th at 1:00 pm     Time spent with patient: 30 minutes.  DHEERAJ Israel, RD, LD

## 2023-03-31 ENCOUNTER — ONCOLOGY VISIT (OUTPATIENT)
Dept: ONCOLOGY | Facility: CLINIC | Age: 43
End: 2023-03-31
Attending: SURGERY
Payer: COMMERCIAL

## 2023-03-31 VITALS
TEMPERATURE: 98 F | HEART RATE: 88 BPM | SYSTOLIC BLOOD PRESSURE: 122 MMHG | OXYGEN SATURATION: 100 % | DIASTOLIC BLOOD PRESSURE: 80 MMHG | RESPIRATION RATE: 20 BRPM

## 2023-03-31 DIAGNOSIS — R19.00 RETROPERITONEAL MASS: ICD-10-CM

## 2023-03-31 PROCEDURE — 99213 OFFICE O/P EST LOW 20 MIN: CPT | Performed by: SURGERY

## 2023-03-31 PROCEDURE — G0463 HOSPITAL OUTPT CLINIC VISIT: HCPCS | Performed by: SURGERY

## 2023-03-31 ASSESSMENT — PAIN SCALES - GENERAL: PAINLEVEL: SEVERE PAIN (7)

## 2023-03-31 NOTE — LETTER
3/31/2023         RE: Kimo Greenwood  [address]  [address]  [facility] [address]        Dear Colleague,    Thank you for referring your patient, Kimo Greenwood, to the [facility]. Please see a copy of my visit note below.    HISTORY OF PRESENT ILLNESS:  Kimo Greenwood is here for a followup visit.  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  It has been biopsied and was benign.  He did have a followup and most recent CT scan was on 03/23/2023, which showed that the lesion was stable.  It measures about 9.9 cm in size.  This appears to be arising from the left adrenal gland and it is probably a myolipoma.  He has no symptoms from his left back or left hip area.  He is having symptoms from his right hip.    IMPRESSION:  Myolipoma of left adrenal gland.    PLAN:  This has been stable for a year.  I have suggested no additional imaging.  I have recommended that if he has pain and a lump in that area to come back and see me.    Total time 20 minutes, which included reviewing his most recent imaging and in-person visit.        Sincerely,        Star Ojeda MD

## 2023-03-31 NOTE — NURSING NOTE
Oncology Rooming Note    March 31, 2023 11:40 AM   Kimo Greenwood is a 42 year old male who presents for:    Chief Complaint   Patient presents with     Oncology Clinic Visit     Retroperitoneal mass     Initial Vitals: /80   Pulse 88   Temp 98  F (36.7  C) (Oral)   Resp 20   SpO2 100%  Estimated body mass index is 54.25 kg/m  as calculated from the following:    Height as of 3/30/23: 1.829 m (6').    Weight as of 3/30/23: 181.4 kg (400 lb). There is no height or weight on file to calculate BSA.  Severe Pain (7) Comment: Data Unavailable   No LMP for male patient.  Allergies reviewed: Yes  Medications reviewed: Yes    Medications: Medication refills not needed today.  Pharmacy name entered into Appetite+:    [id number] - [place], [place] - [address] [facility] [place] - [address] [facility] [place] - [address] [facility] [place] - [address]  [id number] - [place], [place] - [address] AT [facility]    Clinical concerns: questions about aleve/ibuprofen. States primary took it away but did not provide a reason      Stephanie Juares CMA

## 2023-04-04 ENCOUNTER — LAB (OUTPATIENT)
Dept: LAB | Facility: HOSPITAL | Age: 43
End: 2023-04-04
Payer: COMMERCIAL

## 2023-04-04 ENCOUNTER — OFFICE VISIT (OUTPATIENT)
Dept: CARDIOLOGY | Facility: CLINIC | Age: 43
End: 2023-04-04
Payer: COMMERCIAL

## 2023-04-04 VITALS
WEIGHT: 315 LBS | SYSTOLIC BLOOD PRESSURE: 110 MMHG | BODY MASS INDEX: 42.66 KG/M2 | RESPIRATION RATE: 20 BRPM | DIASTOLIC BLOOD PRESSURE: 72 MMHG | HEART RATE: 77 BPM | HEIGHT: 72 IN

## 2023-04-04 DIAGNOSIS — I10 ESSENTIAL HYPERTENSION: ICD-10-CM

## 2023-04-04 DIAGNOSIS — I45.10 RBBB: Primary | ICD-10-CM

## 2023-04-04 DIAGNOSIS — I44.4 LAFB (LEFT ANTERIOR FASCICULAR BLOCK): ICD-10-CM

## 2023-04-04 DIAGNOSIS — I31.39 PERICARDIAL EFFUSION: ICD-10-CM

## 2023-04-04 DIAGNOSIS — G47.33 OBSTRUCTIVE SLEEP APNEA: ICD-10-CM

## 2023-04-04 DIAGNOSIS — E66.01 CLASS 3 SEVERE OBESITY DUE TO EXCESS CALORIES WITH SERIOUS COMORBIDITY AND BODY MASS INDEX (BMI) OF 50.0 TO 59.9 IN ADULT (H): ICD-10-CM

## 2023-04-04 DIAGNOSIS — E66.813 CLASS 3 SEVERE OBESITY DUE TO EXCESS CALORIES WITH SERIOUS COMORBIDITY AND BODY MASS INDEX (BMI) OF 50.0 TO 59.9 IN ADULT (H): ICD-10-CM

## 2023-04-04 PROCEDURE — 86618 LYME DISEASE ANTIBODY: CPT

## 2023-04-04 PROCEDURE — 99204 OFFICE O/P NEW MOD 45 MIN: CPT | Performed by: INTERNAL MEDICINE

## 2023-04-04 PROCEDURE — 36415 COLL VENOUS BLD VENIPUNCTURE: CPT

## 2023-04-04 NOTE — PROGRESS NOTES
Thank you, Dr. Love, for asking the [facility] team to see Mr. Kimo Greenwood to follow-up on abnormal ECG.    Assessment/Recommendations   Assessment:    1.  Abnormal ECG.  This demonstrates sinus rhythm with left anterior fascicular block and right bundle branch block pattern.  No delay through the AV node.  Etiology of this is unclear but may be due to fibrosis of the electrical system.  He has been in areas of [place] where ticks are more prevalent and thus I cannot exclude the possibility of Lyme's disease.  Recommended a Lyme's test to further evaluate this possibility.  He reports no symptoms of lightheadedness, near syncope or true syncope.  Did recommend a Holter monitor to look for any evidence of significant bradycardia or pauses.  If this is unrevealing, I would monitor for any of those symptoms which could suggest progression of his conduction disease.  2.  Pericardial effusion.  This was documented on an echo in September 2022 but was not hemodynamically significant.  Plan had been to repeat an echocardiogram in October although this was not obtained.  Suggested an echocardiogram to follow-up on this.  This is unrelated to the electrical abnormality noted on ECG.  3.  Morbid obesity  4.  LOREN treated with CPAP  5.  Essential hypertension, controlled    Plan:  1.  Continue current medications  2.  Check a Lyme's titer today  3.  Schedule Holter monitor and echocardiogram with further recommendations to follow       History of Present Illness    Mr. Kimo Greenwood is a 42 year old male with mild cognitive delay, morbid obesity, essential hypertension, tobacco abuse, LOREN treated with CPAP who recently called paramedic unit because of some symptoms of shortness of breath as well as vague chest discomfort.  An ECG was obtained in the field and reportedly showed some abnormality; however, he was not brought to the ED for evaluation.  Was advised to follow-up with  primary care which she did last week where an ECG demonstrated sinus rhythm with bifascicular block including left anterior fascicular block and right bundle branch block.  Had a normal ECG 1 year ago.  Now referred here for further recommendations.  Denies any symptoms of lightheadedness, near syncope or true syncope.  Unable to do any walking because of severe hip pain for which he is hoping to have hip surgery once he loses some weight.  Has spent a fair amount of time up [place] and has had tick bites but not certain if he has had a bull's-eye lesion.  Never been treated for Lyme's disease.    ECG (personally reviewed): ECG again reviewed demonstrates normal sinus rhythm with normal [place] interval.  There is left anterior fascicular block, right bundle branch block    Cardiac Imaging Studies (personally reviewed): No recent cardiac imaging     Physical Examination Review of Systems   /72 (BP Location: Right arm, Patient Position: Sitting, Cuff Size: Adult Large)   Pulse 77   Resp 20   Ht 1.829 m (6')   Wt (!) 184.6 kg (407 lb)   BMI 55.20 kg/m    Body mass index is 55.2 kg/m .  Wt Readings from Last 3 Encounters:   04/04/23 (!) 184.6 kg (407 lb)   03/30/23 (!) 181.4 kg (400 lb)   03/22/23 (!) 184.1 kg (405 lb 14.4 oz)     General Appearance:   Awake, Alert, No acute distress.   HEENT:  No scleral icterus; the mucous membranes were pink and moist.   Neck: No cervical bruits or jugular venous distention    Chest: The spine was straight. The chest was symmetric.   Lungs:   Respirations unlabored; the lungs are clear to auscultation. No wheezing   Cardiovascular:    Regular rate and rhythm.  S1, S2 normal.  No murmur or gallop   Abdomen:  No organomegaly, masses, bruits, or tenderness. Bowels sounds are present   Extremities:  1+ pitting edema of the lower legs to the calf.   Skin: No xanthelasma. Warm, Dry.   Musculoskeletal: No tenderness.   Neurologic: Mood and affect are appropriate.    Enc  Vitals  BP: 110/72  Pulse: 77  Resp: 20  Weight: (!) 184.6 kg (407 lb)  Height: 182.9 cm (6')                                         Medical History  Surgical History Family History Social History   Past Medical History:   Diagnosis Date     Arthritis     DDD hips and knees     Asthma      Asthma      Patel's esophagus      Chronic pain      Chronic pain - left hip/leg pain     degenerative disc disease, back, hips, knees     Gastroesophageal reflux disease      History of anesthesia complications     agitation x1 after interstim 2013     Hypertension      Hypertension      Leukocytosis      LPRD (laryngopharyngeal reflux disease)      Marijuana abuse      Marijuana abuse      MDD (major depressive disorder)      MDD (major depressive disorder)      Mental retardation, mild (I.Q. 50-70)      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    With associated speech/language delay     Obesity 11/11/2010     LOREN (obstructive sleep apnea)     Uses a CPAP     LOREN (obstructive sleep apnea)      Seborrheic dermatitis      Seborrheic dermatitis      Sleep apnea     CPAP    Past Surgical History:   Procedure Laterality Date     ARTHROPLASTY HIP Left 1/25/2017    Procedure: ARTHROPLASTY HIP;  Surgeon: Bala Randall MD;  Location:  OR     ARTHROPLASTY HIP Left      ARTHROPLASTY REVISION HIP Left 6/24/2019    Procedure: Revision left total hip arthroplasty with a head and liner exchange to a [employer] dual mobility head and liner.;  Surgeon: Bala Randall MD;  Location:  OR     BLADDER SURGERY      Ibarra, Alida,Interstem stimulator implant     CLOSED REDUCTION HIP Left 6/10/2019    Procedure: Closed reduction under general anesthesia left recurrent prosthetic hip dislocation;  Surgeon: Rafat Cazares MD;  Location:  OR     COLONOSCOPY N/A 10/30/2015    Procedure: COMBINED COLONOSCOPY, SINGLE OR MULTIPLE BIOPSY/POLYPECTOMY BY BIOPSY;  Surgeon: Alexis Jackson MD;  Location:  GI      COLONOSCOPY N/A 11/17/2016    Procedure: COMBINED COLONOSCOPY, SINGLE OR MULTIPLE BIOPSY/POLYPECTOMY BY BIOPSY;  Surgeon: Cat Huber MD;  Location: [facility] GI     COLONOSCOPY N/A 10/28/2020    Procedure: COLONOSCOPY;  Surgeon: You Kraus MD;  Location: [facility] OR     COLONOSCOPY       ESOPHAGOSCOPY, GASTROSCOPY, DUODENOSCOPY (EGD), COMBINED N/A 11/17/2016    Procedure: COMBINED ESOPHAGOSCOPY, GASTROSCOPY, DUODENOSCOPY (EGD), BIOPSY SINGLE OR MULTIPLE;  Surgeon: Cat Huber MD;  Location: [facility] GI     ESOPHAGOSCOPY, GASTROSCOPY, DUODENOSCOPY (EGD), COMBINED N/A 3/12/2020    Procedure: ESOPHAGOGASTRODUODENOSCOPY WITH BIOPSIES;  Surgeon: You Kraus MD;  Location: [facility] OR     ESOPHAGOSCOPY, GASTROSCOPY, DUODENOSCOPY (EGD), COMBINED N/A 10/28/2020    Procedure: ESOPHAGOGASTRODUODENOSCOPY, WITH BIOPSY;  Surgeon: You Kraus MD;  Location: [facility] OR     ESOPHAGOSCOPY, GASTROSCOPY, DUODENOSCOPY (EGD), COMBINED       EXAM UNDER ANESTHESIA, FULGURATE CONDYLOMA ANUS, COMBINED N/A 12/22/2016    Procedure: COMBINED EXAM UNDER ANESTHESIA, FULGURATE CONDYLOMA ANUS;  Surgeon: Nya Cabello MD;  Location: [facility] OR     GENITOURINARY SURGERY       JOINT REPLACEMENT Left 2017    MARGO, Revision Left MARGO     OTHER SURGICAL HISTORY      interstim stimulator implant     DE CYSTOURETHROSCOPY INJ CHEMODENERVATION BLADDER N/A 1/16/2019    Procedure: CYSTOSCOPY BOTOX BLADDER INJECTIONS (100 UNITS IN 10CC);  Surgeon: Didier Ibarra MD;  Location: [facility] OR;  Service: Urology     DE IMPLANT PERIPH/GASTRIC NEUROSTIM/ N/A 1/8/2020    Procedure: NEW INTERSTIM LEAD, REPLACE OLD; NEW INTERSTIM BATTERY, REPLACE OLD;  Surgeon: Didier Ibarra MD;  Location: [facility] OR;  Service: Urology    Family History   Problem Relation Age of Onset     Anxiety Disorder Mother      Depression Mother      Ulcerative Colitis Mother 18     Breast Cancer Maternal Grandmother      Cerebrovascular  "Disease Maternal Grandmother      Breast Cancer Maternal Aunt      Cancer Maternal Grandfather         grt uncles colon cancer    Social History     Socioeconomic History     Marital status: Single     Spouse name: Not on file     Number of children: Not on file     Years of education: Not on file     Highest education level: Not on file   Occupational History     Not on file   Tobacco Use     Smoking status: Every Day     Packs/day: 1.00     Years: 1.00     Pack years: 1.00     Types: Vaping Device, Cigarettes     Smokeless tobacco: Current     Types: Chew     Tobacco comments:     Smokes E Cigs equal to 1 PPD   Vaping Use     Vaping status: Every Day     Substances: Nicotine, Flavoring     Devices: Refillable tank   Substance and Sexual Activity     Alcohol use: Yes     Comment: socially--\"not very often\" \"once a month\"     Drug use: No     Comment: patient denies any marijuana use     Sexual activity: Yes     Partners: Female     Birth control/protection: Condom   Other Topics Concern     Parent/sibling w/ CABG, MI or angioplasty before 65F 55M? No   Social History Narrative     Not on file     Social Determinants of Health     Financial Resource Strain: Not on file   Food Insecurity: Not on file   Transportation Needs: Not on file   Physical Activity: Not on file   Stress: Not on file   Social Connections: Not on file   Intimate Partner Violence: Not At Risk (12/30/2022)    Humiliation, Afraid, Rape, and Kick questionnaire      Fear of Current or Ex-Partner: No      Emotionally Abused: No      Physically Abused: No      Sexually Abused: No   Housing Stability: Not on file          Medications  Allergies   Current Outpatient Medications   Medication Sig Dispense Refill     albuterol (VENTOLIN HFA) 108 (90 Base) MCG/ACT inhaler INHALE 2 PUFFS INTO LUNGS EVERY SIX HOURS AS NEEDED FOR SHORTNESS OF BREATH / DYSPNEA OR WHEEZING 18 g 1     calcium polycarbophil (FIBER-LAX) 625 MG tablet Take 2 tablets (1,250 mg) by " mouth 2 times daily 360 tablet 0     DULoxetine (CYMBALTA) 60 MG capsule Take 120 mg by mouth daily        hydrochlorothiazide (HYDRODIURIL) 25 MG tablet Take 1 tablet (25 mg) by mouth daily 30 tablet 7     Lidocaine (LIDOCARE) 4 % Patch Place 1-2 patches onto the skin every 24 hours To prevent lidocaine toxicity, patient should be patch free for 12 hrs daily. (Patient taking differently: Place onto the skin every 24 hours To prevent lidocaine toxicity, patient should be patch free for 12 hrs daily.) 60 patch 1     lisinopril (ZESTRIL) 10 MG tablet Take 1 tablet (10 mg) by mouth every morning 30 tablet 7     mirabegron (MYRBETRIQ) 50 MG 24 hr tablet Take 1 tablet by mouth daily       OLANZapine-samidorphan (LYBALVI) 10-10 MG TABS tablet Take 1 tablet by mouth At Bedtime       order for DME Equipment being ordered: CPAP supplies 1 each 0     oxybutynin ER (DITROPAN XL) 15 MG 24 hr tablet Take 30 mg by mouth daily       pantoprazole (PROTONIX) 40 MG EC tablet TAKE 1 TABLET BY MOUTH ONCE DAILY 30-60 MINUTES BR FIRST MEAL OF THE DAY 30 tablet 7     prazosin (MINIPRESS) 1 MG capsule Take 3 mg by mouth At Bedtime       Semaglutide-Weight Management (WEGOVY) 1 MG/0.5ML pen Inject 1 mg Subcutaneous once a week 2 mL 3     vitamin B complex with vitamin C (VITAMIN  B COMPLEX) tablet Take 1 tablet by mouth daily       VITAMIN D3 50 MCG (2000 UT) tablet TAKE ONE TABLET BY MOUTH EVERY DAY 90 tablet 3      Allergies   Allergen Reactions     Other [No Clinical Screening - See Comments] Other (See Comments)     Awoke from anesthesia with anger and ripping off dressing, after interstim placement, outside hospital 2013         Lab Results    Chemistry/lipid CBC Cardiac Enzymes/BNP/TSH/INR   Recent Labs   Lab Test 03/07/23  1156   TRIG 76   *   BUN 15.8      CO2 30*    Recent Labs   Lab Test 03/30/23  1217   WBC 17.6*   HGB 13.4   HCT 38.9*   MCV 85       Recent Labs   Lab Test 03/30/23  1217 07/21/22  0950  03/08/22  1018   TSH 1.09   < >  --    INR  --   --  1.01    < > = values in this interval not displayed.        A total of 50 minutes was spent reviewing patient's medical records, obtaining history and performing examination, as well as discussing diagnoses/ recommendations with patient and answering all questions.

## 2023-04-04 NOTE — LETTER
4/4/2023    Ciro Love MD  [id number]  [facility] [address]    RE: Kimo Greenwood       Dear Colleague,     I had the pleasure of seeing Kimo Greenwood in the [facility].      Thank you, Dr. Love, for asking the [facility] team to see Mr. Kimo Greenwood to follow-up on abnormal ECG.    Assessment/Recommendations   Assessment:    1.  Abnormal ECG.  This demonstrates sinus rhythm with left anterior fascicular block and right bundle branch block pattern.  No delay through the AV node.  Etiology of this is unclear but may be due to fibrosis of the electrical system.  He has been in areas [place] where ticks are more prevalent and thus I cannot exclude the possibility of Lyme's disease.  Recommended a Lyme's test to further evaluate this possibility.  He reports no symptoms of lightheadedness, near syncope or true syncope.  Did recommend a Holter monitor to look for any evidence of significant bradycardia or pauses.  If this is unrevealing, I would monitor for any of those symptoms which could suggest progression of his conduction disease.  2.  Pericardial effusion.  This was documented on an echo in September 2022 but was not hemodynamically significant.  Plan had been to repeat an echocardiogram in October although this was not obtained.  Suggested an echocardiogram to follow-up on this.  This is unrelated to the electrical abnormality noted on ECG.  3.  Morbid obesity  4.  LOREN treated with CPAP  5.  Essential hypertension, controlled    Plan:  1.  Continue current medications  2.  Check a Lyme's titer today  3.  Schedule Holter monitor and echocardiogram with further recommendations to follow       History of Present Illness    Mr. Kimo Greenwood is a 42 year old male with mild cognitive delay, morbid obesity, essential hypertension, tobacco abuse, LOREN treated with CPAP who recently called paramedic unit because of some symptoms of shortness  of breath as well as vague chest discomfort.  An ECG was obtained in the field and reportedly showed some abnormality; however, he was not brought to the ED for evaluation.  Was advised to follow-up with primary care which she did last week where an ECG demonstrated sinus rhythm with bifascicular block including left anterior fascicular block and right bundle branch block.  Had a normal ECG 1 year ago.  Now referred here for further recommendations.  Denies any symptoms of lightheadedness, near syncope or true syncope.  Unable to do any walking because of severe hip pain for which he is hoping to have hip surgery once he loses some weight.  Has spent a fair amount of time up [employer] and has had tick bites but not certain if he has had a bull's-eye lesion.  Never been treated for Lyme's disease.    ECG (personally reviewed): ECG again reviewed demonstrates normal sinus rhythm with normal CO interval.  There is left anterior fascicular block, right bundle branch block    Cardiac Imaging Studies (personally reviewed): No recent cardiac imaging     Physical Examination Review of Systems   /72 (BP Location: Right arm, Patient Position: Sitting, Cuff Size: Adult Large)   Pulse 77   Resp 20   Ht 1.829 m (6')   Wt (!) 184.6 kg (407 lb)   BMI 55.20 kg/m    Body mass index is 55.2 kg/m .  Wt Readings from Last 3 Encounters:   04/04/23 (!) 184.6 kg (407 lb)   03/30/23 (!) 181.4 kg (400 lb)   03/22/23 (!) 184.1 kg (405 lb 14.4 oz)     General Appearance:   Awake, Alert, No acute distress.   HEENT:  No scleral icterus; the mucous membranes were pink and moist.   Neck: No cervical bruits or jugular venous distention    Chest: The spine was straight. The chest was symmetric.   Lungs:   Respirations unlabored; the lungs are clear to auscultation. No wheezing   Cardiovascular:    Regular rate and rhythm.  S1, S2 normal.  No murmur or gallop   Abdomen:  No organomegaly, masses, bruits, or tenderness. Bowels sounds  are present   Extremities:  1+ pitting edema of the lower legs to the calf.   Skin: No xanthelasma. Warm, Dry.   Musculoskeletal: No tenderness.   Neurologic: Mood and affect are appropriate.    Enc Vitals  BP: 110/72  Pulse: 77  Resp: 20  Weight: (!) 184.6 kg (407 lb)  Height: 182.9 cm (6')                                         Medical History  Surgical History Family History Social History   Past Medical History:   Diagnosis Date    Arthritis     DDD hips and knees    Asthma     Asthma     Patel's esophagus     Chronic pain     Chronic pain - left hip/leg pain     degenerative disc disease, back, hips, knees    Gastroesophageal reflux disease     History of anesthesia complications     agitation x1 after interstim 2013    Hypertension     Hypertension     Leukocytosis     LPRD (laryngopharyngeal reflux disease)     Marijuana abuse     Marijuana abuse     MDD (major depressive disorder)     MDD (major depressive disorder)     Mental retardation, mild (I.Q. 50-70)     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    With associated speech/language delay    Obesity 11/11/2010    LOREN (obstructive sleep apnea)     Uses a CPAP    LOREN (obstructive sleep apnea)     Seborrheic dermatitis     Seborrheic dermatitis     Sleep apnea     CPAP    Past Surgical History:   Procedure Laterality Date    ARTHROPLASTY HIP Left 1/25/2017    Procedure: ARTHROPLASTY HIP;  Surgeon: Bala Randall MD;  Location:  OR    ARTHROPLASTY HIP Left     ARTHROPLASTY REVISION HIP Left 6/24/2019    Procedure: Revision left total hip arthroplasty with a head and liner exchange to a Biomet dual mobility head and liner.;  Surgeon: Bala Randall MD;  Location:  OR    BLADDER SURGERY      Ibarra, [facility],Interstem stimulator implant    CLOSED REDUCTION HIP Left 6/10/2019    Procedure: Closed reduction under general anesthesia left recurrent prosthetic hip dislocation;  Surgeon: Rafat Cazares MD;  Location:  OR     COLONOSCOPY N/A 10/30/2015    Procedure: COMBINED COLONOSCOPY, SINGLE OR MULTIPLE BIOPSY/POLYPECTOMY BY BIOPSY;  Surgeon: Alexis Jackson MD;  Location: [facility]    COLONOSCOPY N/A 11/17/2016    Procedure: COMBINED COLONOSCOPY, SINGLE OR MULTIPLE BIOPSY/POLYPECTOMY BY BIOPSY;  Surgeon: Cat Huber MD;  Location: [facility]    COLONOSCOPY N/A 10/28/2020    Procedure: COLONOSCOPY;  Surgeon: You Kraus MD;  Location: [facility]    COLONOSCOPY      ESOPHAGOSCOPY, GASTROSCOPY, DUODENOSCOPY (EGD), COMBINED N/A 11/17/2016    Procedure: COMBINED ESOPHAGOSCOPY, GASTROSCOPY, DUODENOSCOPY (EGD), BIOPSY SINGLE OR MULTIPLE;  Surgeon: Cat Huber MD;  Location: [facility]    ESOPHAGOSCOPY, GASTROSCOPY, DUODENOSCOPY (EGD), COMBINED N/A 3/12/2020    Procedure: ESOPHAGOGASTRODUODENOSCOPY WITH BIOPSIES;  Surgeon: You Kraus MD;  Location: [facility]    ESOPHAGOSCOPY, GASTROSCOPY, DUODENOSCOPY (EGD), COMBINED N/A 10/28/2020    Procedure: ESOPHAGOGASTRODUODENOSCOPY, WITH BIOPSY;  Surgeon: You Kraus MD;  Location: [facility]    ESOPHAGOSCOPY, GASTROSCOPY, DUODENOSCOPY (EGD), COMBINED      EXAM UNDER ANESTHESIA, FULGURATE CONDYLOMA ANUS, COMBINED N/A 12/22/2016    Procedure: COMBINED EXAM UNDER ANESTHESIA, FULGURATE CONDYLOMA ANUS;  Surgeon: Nya Cabello MD;  Location: [facility]    GENITOURINARY SURGERY      JOINT REPLACEMENT Left 2017    MARGO, Revision Left MARGO    OTHER SURGICAL HISTORY      interstim stimulator implant    DE CYSTOURETHROSCOPY INJ CHEMODENERVATION BLADDER N/A 1/16/2019    Procedure: CYSTOSCOPY BOTOX BLADDER INJECTIONS (100 UNITS IN 10CC);  Surgeon: Didier Ibarra MD;  Location: [facility] OR;  Service: Urology    DE IMPLANT PERIPH/GASTRIC NEUROSTIM/ N/A 1/8/2020    Procedure: NEW INTERSTIM LEAD, REPLACE OLD; NEW INTERSTIM BATTERY, REPLACE OLD;  Surgeon: Didier Ibarra MD;  Location: [facility] OR;  Service: Urology    Family History   Problem Relation Age of  "Onset    Anxiety Disorder Mother     Depression Mother     Ulcerative Colitis Mother 18    Breast Cancer Maternal Grandmother     Cerebrovascular Disease Maternal Grandmother     Breast Cancer Maternal Aunt     Cancer Maternal Grandfather         grt uncles colon cancer    Social History     Socioeconomic History    Marital status: Single     Spouse name: Not on file    Number of children: Not on file    Years of education: Not on file    Highest education level: Not on file   Occupational History    Not on file   Tobacco Use    Smoking status: Every Day     Packs/day: 1.00     Years: 1.00     Pack years: 1.00     Types: Vaping Device, Cigarettes    Smokeless tobacco: Current     Types: Chew    Tobacco comments:     Smokes E Cigs equal to 1 PPD   Vaping Use    Vaping status: Every Day     Substances: Nicotine, Flavoring     Devices: Refillable tank   Substance and Sexual Activity    Alcohol use: Yes     Comment: socially--\"not very often\" \"once a month\"    Drug use: No     Comment: patient denies any marijuana use    Sexual activity: Yes     Partners: Female     Birth control/protection: Condom   Other Topics Concern    Parent/sibling w/ CABG, MI or angioplasty before 65F 55M? No   Social History Narrative    Not on file     Social Determinants of Health     Financial Resource Strain: Not on file   Food Insecurity: Not on file   Transportation Needs: Not on file   Physical Activity: Not on file   Stress: Not on file   Social Connections: Not on file   Intimate Partner Violence: Not At Risk (12/30/2022)    Humiliation, Afraid, Rape, and Kick questionnaire     Fear of Current or Ex-Partner: No     Emotionally Abused: No     Physically Abused: No     Sexually Abused: No   Housing Stability: Not on file          Medications  Allergies   Current Outpatient Medications   Medication Sig Dispense Refill    albuterol (VENTOLIN HFA) 108 (90 Base) MCG/ACT inhaler INHALE 2 PUFFS INTO LUNGS EVERY SIX HOURS AS NEEDED FOR " SHORTNESS OF BREATH / DYSPNEA OR WHEEZING 18 g 1    calcium polycarbophil (FIBER-LAX) 625 MG tablet Take 2 tablets (1,250 mg) by mouth 2 times daily 360 tablet 0    DULoxetine (CYMBALTA) 60 MG capsule Take 120 mg by mouth daily       hydrochlorothiazide (HYDRODIURIL) 25 MG tablet Take 1 tablet (25 mg) by mouth daily 30 tablet 7    Lidocaine (LIDOCARE) 4 % Patch Place 1-2 patches onto the skin every 24 hours To prevent lidocaine toxicity, patient should be patch free for 12 hrs daily. (Patient taking differently: Place onto the skin every 24 hours To prevent lidocaine toxicity, patient should be patch free for 12 hrs daily.) 60 patch 1    lisinopril (ZESTRIL) 10 MG tablet Take 1 tablet (10 mg) by mouth every morning 30 tablet 7    mirabegron (MYRBETRIQ) 50 MG 24 hr tablet Take 1 tablet by mouth daily      OLANZapine-samidorphan (LYBALVI) 10-10 MG TABS tablet Take 1 tablet by mouth At Bedtime      order for DME Equipment being ordered: CPAP supplies 1 each 0    oxybutynin ER (DITROPAN XL) 15 MG 24 hr tablet Take 30 mg by mouth daily      pantoprazole (PROTONIX) 40 MG EC tablet TAKE 1 TABLET BY MOUTH ONCE DAILY 30-60 MINUTES BR FIRST MEAL OF THE DAY 30 tablet 7    prazosin (MINIPRESS) 1 MG capsule Take 3 mg by mouth At Bedtime      Semaglutide-Weight Management (WEGOVY) 1 MG/0.5ML pen Inject 1 mg Subcutaneous once a week 2 mL 3    vitamin B complex with vitamin C (VITAMIN  B COMPLEX) tablet Take 1 tablet by mouth daily      VITAMIN D3 50 MCG (2000 UT) tablet TAKE ONE TABLET BY MOUTH EVERY DAY 90 tablet 3      Allergies   Allergen Reactions    Other [No Clinical Screening - See Comments] Other (See Comments)     Awoke from anesthesia with anger and ripping off dressing, after interstim placement, outside hospital 2013         Lab Results    Chemistry/lipid CBC Cardiac Enzymes/BNP/TSH/INR   Recent Labs   Lab Test 03/07/23  1156   TRIG 76   *   BUN 15.8      CO2 30*    Recent Labs   Lab Test 03/30/23  1217    WBC 17.6*   HGB 13.4   HCT 38.9*   MCV 85       Recent Labs   Lab Test 03/30/23  1217 07/21/22  0950 03/08/22  1018   TSH 1.09   < >  --    INR  --   --  1.01    < > = values in this interval not displayed.        A total of 50 minutes was spent reviewing patient's medical records, obtaining history and performing examination, as well as discussing diagnoses/ recommendations with patient and answering all questions.                    Thank you for allowing me to participate in the care of your patient.    Sincerely,     Shanice Adler MD     [facility]

## 2023-04-04 NOTE — PATIENT INSTRUCTIONS
Continue current medications  Set up holter monitor and echocardiogram  Check blood work for lymes disease

## 2023-04-05 LAB — B BURGDOR IGG+IGM SER QL: 0.59

## 2023-04-11 ENCOUNTER — TELEPHONE (OUTPATIENT)
Dept: ENDOCRINOLOGY | Facility: CLINIC | Age: 43
End: 2023-04-11
Payer: COMMERCIAL

## 2023-04-11 DIAGNOSIS — E66.813 CLASS 3 SEVERE OBESITY DUE TO EXCESS CALORIES WITH SERIOUS COMORBIDITY AND BODY MASS INDEX (BMI) OF 50.0 TO 59.9 IN ADULT (H): Primary | ICD-10-CM

## 2023-04-11 DIAGNOSIS — E66.01 CLASS 3 SEVERE OBESITY DUE TO EXCESS CALORIES WITH SERIOUS COMORBIDITY AND BODY MASS INDEX (BMI) OF 50.0 TO 59.9 IN ADULT (H): Primary | ICD-10-CM

## 2023-04-11 NOTE — TELEPHONE ENCOUNTER
Patients appetite is increasing. Patient is now interest in increasing the dosing of his Wegovy. Please send to [facility].    [phone] C[place]

## 2023-04-12 NOTE — TELEPHONE ENCOUNTER
Patient requesting to increase Wegovy to 1.7 mg.  Approved by Nya EDWARDS.  New dosage sent to pharmacy.

## 2023-04-14 ENCOUNTER — TELEPHONE (OUTPATIENT)
Dept: FAMILY MEDICINE | Facility: CLINIC | Age: 43
End: 2023-04-14
Payer: COMMERCIAL

## 2023-04-14 NOTE — TELEPHONE ENCOUNTER
Patient and caregiver called asking abut a DME order for a 4 wheels walker. They stated they are at [facility] and only have a wheelchair ordered. Placed patient on hold to look at orders to see if writer could refax order. No walker order noted. When writer picked up call patient stated that they were all set and got what they need. Confirmed with patient there is no further action needed.     Yesenia Coburn RN [facility]

## 2023-04-27 ENCOUNTER — VIRTUAL VISIT (OUTPATIENT)
Dept: ENDOCRINOLOGY | Facility: CLINIC | Age: 43
End: 2023-04-27
Payer: COMMERCIAL

## 2023-04-27 DIAGNOSIS — Z71.3 NUTRITIONAL COUNSELING: Primary | ICD-10-CM

## 2023-04-27 DIAGNOSIS — E66.01 CLASS 3 SEVERE OBESITY DUE TO EXCESS CALORIES WITH SERIOUS COMORBIDITY AND BODY MASS INDEX (BMI) OF 50.0 TO 59.9 IN ADULT (H): ICD-10-CM

## 2023-04-27 DIAGNOSIS — E66.813 CLASS 3 SEVERE OBESITY DUE TO EXCESS CALORIES WITH SERIOUS COMORBIDITY AND BODY MASS INDEX (BMI) OF 50.0 TO 59.9 IN ADULT (H): ICD-10-CM

## 2023-04-27 PROCEDURE — 97803 MED NUTRITION INDIV SUBSEQ: CPT | Mod: VID | Performed by: DIETITIAN, REGISTERED

## 2023-04-27 PROCEDURE — 99207 PR NO CHARGE LOS: CPT | Mod: VID | Performed by: DIETITIAN, REGISTERED

## 2023-04-27 NOTE — PATIENT INSTRUCTIONS
GOALS:  1. Aim to increase to 3-4 water bottles/day  2. Trial no chocolate milk next week  3. Plan to try chicken enchilada and salmon patties     Ideas to bring on the go:   - Nutrigrain bars   - Almonds   - Tuna    - Snack packs   - Dried fruit (try to find no sugar added)   - Yogurt   - Yogurt covered nuts       Meal ideas or components discussed: honey mustard chicken, baked beans, asparagus, broccoli, chili with beans and beef, white chicken chili with onions/peppers, fish - walleye, salmon, etc., Barilla Protein + pasta, loaded baked potatoe, sweet potatoe breakfast skillet with eggs, tuna salad, tuna casserole       RESOURCES:     Plate method can be used for general guidance on balanced meals/portion sizes (see link below for picture/more information)                 Make 1/2 your plate non starchy vegetables (cauliflower, broccoli, asparagus, [place] sprouts, lettuce, carrots, for example).                5+ oz lean protein sources (salmon/skinless chicken/turkey breast/pork loin/lean cuts of beef/ or non-animal protein such as black, kidney or kuo beans, tofu/edamame/tempeh)     (Note: 3 oz = deck of cards size)                 ~1 cup carbohydrate choices such as whole grain starches or starchy vegetables or fruit. For example: quinoa, brown rice, barley, potatoes, sweet potatoes, winter squash, peas, corn, or fruit.               Choose ~0-2 added fat servings at a meal (avocados, nut butters, nuts or seeds, olive oil, vegetable oils).    The Plate Method:  https://www.cdc.gov/diabetes/images/managing/Diabetes-Manage-Eat-Well-Plate-Graphic_600px.jpg    Building a Plate  [email or web address]://[email or web address]    Protein Sources for Weight Loss  [email or web address]     Carbohydrates  [email or web address]     Mindful Eating  [email or web address]     Summary of Volumetrics Eating Plan  [email or web address]     Seated Exercises for Arms and Legs (can be done before or after  surgery)  [email or web address]    Follow up:    Monday, June 19th in person at 1:30 pm    DHEERAJ Chinchilla, RD, LD  Clinic #: [phone]

## 2023-04-27 NOTE — LETTER
"4/27/2023       RE: Kimo Greenwood  [address]  [address]  [facility] [address]     Dear Colleague,    Thank you for referring your patient, Kimo Greenwood, to the [facility] [place] [facility]. Please see a copy of my visit note below.    Kimo Greenwood is a 42 year old male who is being evaluated via a billable video visit.      The patient has been notified of following:     \"This video visit will be conducted via a call between you and your physician/provider. We have found that certain health care needs can be provided without the need for an in-person physical exam.  This service lets us provide the care you need with a video conversation.  If a prescription is necessary we can send it directly to your pharmacy.  If lab work is needed we can place an order for that and you can then stop by our lab to have the test done at a later time.    Video visits are billed at different rates depending on your insurance coverage.  Please reach out to your insurance provider with any questions.    If during the course of the call the physician/provider feels a video visit is not appropriate, you will not be charged for this service.\"    Patient has given verbal consent for Video visit? Yes  How would you like to obtain your AVS? MyChart  If you are dropped from the video visit, the video invite should be resent to: Text to cell phone: [phone]  Will anyone else be joining your video visit?  DERRICK Garcia worker  {If patient encounters technical issues they should call [phone]      Video-Visit Details    Type of service:  Video Visit    Video Start Time: 1:00 pm  Video End Time: 1:30 pm    Originating Location (pt. Location): Home    Distant Location (provider location):  Offsite (providers home)     Platform used for Video Visit: Rodger    During this virtual visit the patient is located in [place], patient verifies this as " "the location during the entirety of this visit.     Nutrition Consultation Note    Reason For Visit: Nutrition Assessment    Kimo Greenwood is a 42 year old presenting today for return nutrition consult.   Pt is interested in laparoscopic sleeve gastrectomy.  Will proceed with MWM at this time per pt/his mother.     Patient is accompanied by his mother, Belen.      This is pt's 4th required nutrition visits prior to surgery. Did NOT review the surgical information at this time - focusing on weight loss first.    Pt referred by JERRY Marinelli on January 10, 2023.    Coordination note (if pursuing surgery in future)   Needs baseline labs - Done 1/9/23  Needs Psych eval  Needs PCP letter of support  40 lb weight loss from initial  Surgeon meet and greet with Dr. Madrigal  Needs letter of support from therapist  Needs letter of support from psychiatrist       CO-MORBIDITIES OF OBESITY INCLUDE:        1/9/2023     1:43 PM   --   I have the following health issues associated with obesity None of the above     PMH:     Per PA note:  \"Hip OA - needing hip replacement.      LOREN - uses CPAP every night. Followed by PCP      Barretts esophagus - has not had any GERD symptoms recently. Well controlled on PPI.      HTN - well controlled on medications      Degenerative disk disease - causes low back pain. 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 Followed by urology\"    Depression - has therapist/psych, hx of suicide attempt per questionnaire     Speech/Language delay    SUPPORT:      1/9/2023     1:43 PM   Support System Reviewed With Patient   Who do you have in your support network that can be available to help you for the first 2 weeks after surgery? agency   Who can you count on for support throughout your weight loss surgery journey? fully supportive       ANTHROPOMETRICS:  Consult weight 1/9/23: 407 lbs with BMI 55.28    Estimated body mass index is 55.2 kg/m  as calculated from the following:    " Height as of 4/4/23: 1.829 m (6').    Weight as of 4/4/23: 184.6 kg (407 lb).     Current weight: 389 lbs, pt report. Home scale    Goal weight for hip replacement per Shasha's note: 330 lbs         View : No data to display.                    1/9/2023     1:43 PM   Weight Loss Questions Reviewed With Patient   How long have you been overweight? Since early childhood     MEDICATIONS FOR WEIGHT LOSS:  Wegovy prescribed 1/9/22 - On 1.7 mg dose now    Hx   Saxenda -  Had been denied at first but was able to get with appeal per pt  Topiramate (ineffective)    SUPPLEMENT INFORMATION:  Vitamin D3 5,000 international unit(s)/day  B complex    Labs 1/9/23  Vit D 32  PTH WNL  Lipids WNL aside from elevated LDL  A1C 5.6   WBC off - WBC high, RBC low, Hgb low, Hematocrit low    Hx of Vit D def    NUTRITION HISTORY:  Pt present today with his mom and legal guardian, Belen.     Patient is considering Bariatric surgery. Hoping to lose weight for hip replacement with goal of 330 lbs. Weight gain due to changes in diet and decrease in activity.     Wants to work on MWM first and consider surgical approach down the line as appropriate.     Worked with a RD a long time ago at [facility] for weight loss.     Mobility is very limited.   Moving into an apartment with a pool next month.     Per PA note (not yet finalized at time of writing this)    Eating 2 meals a day, with snacks. Trying to decrease pop. Meals are provided by group home. However, will go to the store to get candy, chip, cookies, crackers, ice cream, and pudding, and soda. Has increased hunger. Struggles with getting full. From exam, however Willard have increased hunger,   Breakfast - cereal with milk   Lunch - provided by group home  Dinner - pizza   Snacks - cookies, animal crackers   Pop - 1 liter of regular pop at a time.      Activity - Hip pain limits activity. Will try to get up and walk around as much as possible. Cannot walk more then 50 yards at a  time.     Fluids: OJ, pop (rosmery jean baptiste, dr. Pepper, [facility]) , powerade, crystal light, water, smoothies  Currently drinking 1-2 24 oz bottles of pop/day per pt. Pt s mother thinks he may be drinking more than this  Open to considering diet pop instead of regular. Ideally want to switch to water/sparkling water long-term   Has a soda stream per pt s mom    Vegetables he likes: broccoli, celery, carrots, Brussel sprouts, asparagus   Likes baked beans    Alochol: rare    Nicotine: vapes     Feb 2023:  Patient moved into a new place since last seen.  Patient present today with Crys, one of his helpers.     Per Crys/pt recent foods have included:    Breakfast - oatmeal or oatmeal or pb toast or egg sausage sandwich  L - varies, example: salad  D - varies, homemade pizza, homemade chicken drumsticks, grilled cheese with side of chips  Snacks: yoplait with peaches, banana bread, oranges/apples    Patient and Crys wanted to go over meal ideas for Willard. Did not review last month's goals in detail. Will carry over to this month     March 2023:    Pt reports making some dietary changes such as trying to do skinny cow ice cream instead of regular.     Struggling with cravings. Hard to stick to buying 1 pop as opposed to a case. Feels he is addicted.     Occ getting busy and not eating enough - today did not eat for first time until about 1 pm. Spent session today discussing food ideas for on the go and nutritious alternatives to foods he likes.    April 2023:  Using sugar-free flavor packs to help with hydration. Drinking 2-3 water bottles/day.   Also drinking some coffee in the mornings.  Other fluids include: OJ, low fat chocolate milk (about 1/2 gallon), powerade  Also working on buying 6 packs instead of 12 packs of pop.     Started having breakfast more often. Bought some cinnamon special K and made banana pancakes.    Lunch examples chicken salad sandwich, tuna sandwich, pb & j. Yesterday had chicken and some oranges.      Dinner examples mac n cheese, tuscon soup, kilbasa, venison steak with salad and fruit, fajitas with low carb wraps    Previous goals:  1. Check in with Anish on tracking intake. If not able to do with her - I am happy to try to help during an in person appointment. Please schedule for 60 minutes if going this route so that I can have time to help with this. Can schedule at [phone].  2. Aim to buy 1 pop at a time as opposed to 6 pack - Not yet but doing 6 pack as opposed to 12 pack.  3. Consider sorbet, frozen yogurt or other ice cream products (skinny cow, halo) or smaller portion of ice cream - Getting skinny cow products.   4. Aim to make some smoothies with Jose - Not yet. Not in grocery budget currently.     Additional information:  Disabled - lives at [facility] - moving Feb 6th to a crisis housing apartment, will have ILS staff     Single         1/9/2023     1:43 PM   Recall Diet Questions Reviewed With Patient   Describe what you typically consume for lunch (typical or most recent) candy soda(8)cans to 10cans a day   Describe what you typically consume for supper (typical or most recent) pizza   How many ounces of water, or other low calorie drinks, do you drink daily (8 oz=1 glass)? 0 oz   How many ounces of caffeine (coffee, tea, pop) do you drink daily (8 oz=1 glass)? 24 oz   How many ounces of milk do you drink daily (8 oz=1 glass) 0 oz   How often do you drink alcohol? Never           1/9/2023     1:43 PM   Eating Habits   Do you have any dietary restrictions? No   Do you currently binge eat (eat a large amount of food in a short time)? Yes   Are you an emotional eater? Yes   Do you get up to eat after falling asleep? Yes           1/9/2023     1:43 PM   Dining Out History Reviewed With Patient   How often do you dine out? Never.   Where do you dine out? (select all that apply) fast food chains   What types of food do you order when you dine out? hamburger        EXERCISE:        1/9/2023     1:43 PM   --   How often do you exercise? Never   What keeps you from being more active? Pain       NUTRITION DIAGNOSIS:  Obesity r/t long history of positive energy balance aeb BMI >30 kg/m2.    INTERVENTION:  Reviewed current dietary habits and pts history   Discussed long-term goals pt hopes to accomplish in RD appointments  Answered pt questions  Coordination of care   Nutrition education - Plate method, fats, low kcal beverages, starchy vs non-starchy vegetables, sources of carbohydrates, lean protein vs fattier proteins  AVS and handouts via [employer]          1/9/2023     1:43 PM   Questions Reviewed With Patient   How ready are you to make changes regarding your weight? Number 1 = Not ready at all to make changes up to 10 = very ready. 1   How confident are you that you can change? 1 = Not confident that you will be successful making changes up to 10 = very confident. 1       Expected Engagement: fair-good (good engagement during appt however pt rated confidence as a 1 per questionnaire)     GOALS:  1. Aim to increase to 3-4 water bottles/day  2. Trial no chocolate milk next week  3. Plan to try chicken enchilada and salmon patties     Ideas to bring on the go:   - Nutrigrain bars   - Almonds   - Tuna    - Snack packs   - Dried fruit (try to find no sugar added)   - Yogurt   - Yogurt covered nuts       Meal ideas or components discussed: honey mustard chicken, baked beans, asparagus, broccoli, chili with beans and beef, white chicken chili with onions/peppers, fish - walleye, salmon, etc., Barilla Protein + pasta, loaded baked potatoe, sweet potatoe breakfast skillet with eggs, tuna salad, tuna casserole       RESOURCES:     Plate method can be used for general guidance on balanced meals/portion sizes (see link below for picture/more information)                 Make 1/2 your plate non starchy vegetables (cauliflower, broccoli, asparagus, [place] sprouts, lettuce, carrots,  for example).                5+ oz lean protein sources (salmon/skinless chicken/turkey breast/pork loin/lean cuts of beef/ or non-animal protein such as black, kidney or kuo beans, tofu/edamame/tempeh)     (Note: 3 oz = deck of cards size)                 ~1 cup carbohydrate choices such as whole grain starches or starchy vegetables or fruit. For example: quinoa, brown rice, barley, potatoes, sweet potatoes, winter squash, peas, corn, or fruit.               Choose ~0-2 added fat servings at a meal (avocados, nut butters, nuts or seeds, olive oil, vegetable oils).    The Plate Method:  https://www.cdc.gov/diabetes/images/managing/Diabetes-Manage-Eat-Well-Plate-Graphic_600px.jpg    Building a Plate  [email or web address]    Protein Sources for Weight Loss  [email or web address]     Carbohydrates  [email or web address]     Mindful Eating  [email or web address]     Summary of Volumetrics Eating Plan  [email or web address]     Seated Exercises for Arms and Legs (can be done before or after surgery)  [email or web address]    Follow up:    Monday, June 19th in person at 1:30 pm    Time spent with patient: 30 minutes.  DHEERAJ Israel, RD, LD

## 2023-05-05 DIAGNOSIS — E66.813 CLASS 3 SEVERE OBESITY DUE TO EXCESS CALORIES WITH SERIOUS COMORBIDITY AND BODY MASS INDEX (BMI) OF 50.0 TO 59.9 IN ADULT (H): ICD-10-CM

## 2023-05-05 DIAGNOSIS — E66.01 CLASS 3 SEVERE OBESITY DUE TO EXCESS CALORIES WITH SERIOUS COMORBIDITY AND BODY MASS INDEX (BMI) OF 50.0 TO 59.9 IN ADULT (H): ICD-10-CM

## 2023-05-08 ENCOUNTER — HOSPITAL ENCOUNTER (OUTPATIENT)
Dept: CARDIOLOGY | Facility: CLINIC | Age: 43
Discharge: HOME | End: 2023-05-08
Attending: INTERNAL MEDICINE
Payer: COMMERCIAL

## 2023-05-08 DIAGNOSIS — I44.4 LAFB (LEFT ANTERIOR FASCICULAR BLOCK): ICD-10-CM

## 2023-05-08 DIAGNOSIS — I45.10 RBBB: ICD-10-CM

## 2023-05-08 DIAGNOSIS — I31.39 PERICARDIAL EFFUSION: ICD-10-CM

## 2023-05-08 LAB — LVEF ECHO: NORMAL

## 2023-05-08 PROCEDURE — 93306 TTE W/DOPPLER COMPLETE: CPT | Mod: 26 | Performed by: INTERNAL MEDICINE

## 2023-05-08 PROCEDURE — 93226 XTRNL ECG REC<48 HR SCAN A/R: CPT

## 2023-05-08 PROCEDURE — 999N000248 HC STATISTIC IV INSERT WITH US BY RN

## 2023-05-08 PROCEDURE — 999N000208 ECHOCARDIOGRAM COMPLETE

## 2023-05-08 PROCEDURE — 255N000002 HC RX 255 OP 636: Performed by: INTERNAL MEDICINE

## 2023-05-08 RX ADMIN — PERFLUTREN 4 ML: 6.52 INJECTION, SUSPENSION INTRAVENOUS at 14:03

## 2023-05-09 NOTE — TELEPHONE ENCOUNTER
Semaglutide-Weight Management (WEGOVY) 1.7 MG/0.75ML pen      Last Written Prescription Date:  4/12/23  Last Fill Quantity: 3ml,   # refills: 0  Last Office Visit : 3/13/23  Future Office visit:  6/8/23      Creatinine   Date Value Ref Range Status   03/07/2023 1.08 0.67 - 1.17 mg/dL Final   10/26/2020 0.86 0.66 - 1.25 mg/dL Final       Routing refill request to provider for review/approval because:  Last ordered for 30 days with zero refills. Next appt not until 6/8/23. Routing to provider for review due to limited quantity ordered 4/12/23.

## 2023-05-12 PROCEDURE — 93227 XTRNL ECG REC<48 HR R&I: CPT | Performed by: INTERNAL MEDICINE

## 2023-06-01 ENCOUNTER — HEALTH MAINTENANCE LETTER (OUTPATIENT)
Age: 43
End: 2023-06-01

## 2023-06-02 ENCOUNTER — OFFICE VISIT (OUTPATIENT)
Dept: SLEEP MEDICINE | Facility: CLINIC | Age: 43
End: 2023-06-02
Payer: COMMERCIAL

## 2023-06-02 VITALS
SYSTOLIC BLOOD PRESSURE: 106 MMHG | HEIGHT: 72 IN | HEART RATE: 80 BPM | WEIGHT: 315 LBS | OXYGEN SATURATION: 96 % | DIASTOLIC BLOOD PRESSURE: 67 MMHG | BODY MASS INDEX: 42.66 KG/M2

## 2023-06-02 DIAGNOSIS — F32.A DEPRESSION, UNSPECIFIED DEPRESSION TYPE: ICD-10-CM

## 2023-06-02 DIAGNOSIS — E66.01 MORBID OBESITY (H): ICD-10-CM

## 2023-06-02 DIAGNOSIS — I10 ESSENTIAL HYPERTENSION: ICD-10-CM

## 2023-06-02 DIAGNOSIS — G47.33 OSA (OBSTRUCTIVE SLEEP APNEA): Primary | ICD-10-CM

## 2023-06-02 PROCEDURE — 99205 OFFICE O/P NEW HI 60 MIN: CPT | Performed by: PHYSICIAN ASSISTANT

## 2023-06-02 NOTE — PATIENT INSTRUCTIONS
Very nice meeting you today Willard!  1.  We increased the pressure settings on your machine today to 9-15 cm H2O to help treat your sleep apnea better  2.  Please schedule a mask fit/mask consult appointment with [facility] to get a better fitting mask since yours is leaking.  I renewed your prescription and will send to [facility] for you for new mask and supplies.    3.  Please work on using your CPAP machine every single night for the entirety of sleep duration for maximal benefits, but minimum of 4 hours per night is required.    4.  See below, but please go to the website highlighted in red ink to register your machine for replacement through [employer].    I will see you back in about 3 months to see how things are going with these changes made     Your sleep apnea treatment may be affected by device recall    Our records show that you may have a Abby Respironics CPAP for the treatment of sleep apnea. Many of these devices have been recalled* by the  for replacement. [facility] recommends:     1) If you are using a Resmed device, continue using the device.  2) If you have a Abby Respironics device, register your device for confirmation of type of device and repair of the device at https://www.GetMyRx/healthcare/e/sleep/communications/src-update -if you cannot use link, call [phone]. The website will assist you in obtaining the serial number for registration.   3) If you are using a Abby Respironics CPAP or Bilevel PAP device and you do not have immediate breathing, driving or cardiovascular risks without the device, consider stopping use of the device after verification that is has been recalled. Discuss this decision with your medical provider if you are uncertain about your medical risks.  4) If you are not using Respironics CPAP but are using a Respironics advanced device for breathing support (AVAPS, ASV, Bilevel PAP), continue using the device  and review 5 and 6 below).     5) If you continue the device, do not include ozone generating  connected to PAP devices.  6) Bacterial filters to reduce exposure to particulates are sometimes cumbersome to use and are not currently recommended by the .    ?       You may also choose discuss with your provider alternative approaches to treatment.      *[employer] is voluntarily recalling the below devices due to two (2) issues related to the polyester-based polyurethane (PE-PUR) sound abatement foam used in Abby Continuous and Non-Continuous Ventilators: 1) PE-PUR foam may degrade into particles which may enter the device's the air pathway and be ingested or inhaled by the user, and 2) the PE-PUR foam may off-gas certain chemicals. The foam degradation may be exacerbated by use of unapproved cleaning methods, such as ozone (see FDA safety communication on use of Ozone ), and off-gassing may occur during initial operation and may possibly continue throughout the device's useful life.   These issues can result in serious injury which can be life-threatening, cause permanent impairment, and/or require medical intervention to preclude permanent impairment. To date, [employer] has received several complaints regarding the presence of black debris/particles within the airpath circuit (extending from the device outlet, humidifier, tubing, and mask). Abby also has received reports of headache, upper airway irritation, cough, chest pressure and sinus infection. The potential risks of particulate exposure include: Irritation (skin, eye, and respiratory tract), inflammatory response, headache, asthma, adverse effects to other organs (e.g. kidneys and liver) and toxic carcinogenic affects. The potential risks of chemical exposure due to off-gassing include: headache/dizziness, irritation (eyes, nose, respiratory tract, skin), hypersensitivity, nausea/vomiting, toxic and  carcinogenic effects. There have been no reports of death as a result of these issues.    Actions to be taken:  Discontinue the use of your device.  Do not continue to use ozone  with the device.     [facility] affected devices on the recall website, www.Lingohub/SRC-update    i. The website provides current information on the status of the recall and how  to receive permanent corrective action to address the two issues.    ii. The website also provides instructions on how to locate an affected device  Serial Number and will guide users through the registration process.    iii. In the [place], call [phone] Service Hotline if you cannot visit the website

## 2023-06-02 NOTE — PROGRESS NOTES
Outpatient Sleep Medicine Consultation:      Name: Kimo Greenwood MRN# [id number]   Age: 42 year old YOB: 1980     Date of Consultation: June 2, 2023  Consultation is requested by: Nya Lara PA-C  [address] [place] [id number] Nya Lara  Primary care provider: Ciro Love       Reason for Sleep Consult:     Kimo Greenwood is sent by Nya Lara for a sleep consultation regarding LOREN.    Chief Complaint   Patient presents with     Sleep Problem     LOREN, establish care           Assessment and Plan:     Summary Sleep Diagnoses:  1. LOREN (obstructive sleep apnea)  Comorbid Diagnoses:  2. Essential hypertension  3. Depression, unspecified depression type  4. Morbid obesity (H)    Patient presents to clinic today to establish care of his obstructive sleep apnea treated with CPAP.  He was initially diagnosed back in April 2013 with AHI 23.2, RDI 41.1 and has been treated with CPAP therapy ever since.  Has not seen anyone in sleep medicine in a number of years.  CPAP download shows current CPAP settings 7-15 cm H2O is not treating sleep apnea very well with high residual AHI of 15.4.  Of note, there is significant large leak on download, almost 2 hours which is likely artificially increasing AHI a bit.  Pressure settings do feel too low for him so we agreed to increase today to 9-15 cm H2O to help decrease AHI and give him more comfort.  He does appear to be compliant though his usage is quite variable based on the day.  Discussed that he will get the most benefit if he uses on a nightly basis for entirety of sleep duration, but minimum of 4 hours a night is required and he voiced understanding.  We also discussed that his DreamStation machine is very likely affected under recall, information provided in AVS on how to register for replacement with [employer].  He does not know how long he has had this machine for, we could also consider writing prescription for replacement machine  if it is over 5 years of age.  Prescription was renewed for new mask and supplies today and was sent to [facility] for him.  He will go there for a mask fit/mask consult appointment to find a better fitting mask to help with the leaking.  Primary reason for today's visit appears to be for sleep clearance for bariatric surgery, discussed I will likely be able to provide this at next visit once I see his AHI lowered as long as he is compliant on therapy.  We will plan to see him back in about 3 months for recheck on the above. Educational materials provided in instructions  - Comprehensive DME         History of Present Illness:     Kimo Greenwood is a 42-year-old male with morbid obesity, chronic low back pain, asthma, Patel's esophagus, hypertension, depression, RBBB, intellectual delay, history of moderate to severe LOREN treated with CPAP therapy who presents to clinic today with his PCA to establish care of his LOREN.  He was referred by bariatric team for sleep clearance for bariatric surgery.    Patient was initially diagnosed with LOREN via split night PSG completed 4/15/2013 at [facility] in [place] (344#, BMI 49.2) showing AHI 23.2, RDI 41.1, oxygen gillian 85%.  PLM index 1.5 with PLM arousal 0.4.  CPAP was titrated from 5 to 9 cm H2O with only lateral sleep observed. Patient was started on autoCPAP [id number] and appears he has been on the setting ever since.     Uses [facility] for DME.  Reports things are going well in regards to CPAP therapy.  He uses a full facemask and finds the mask to be comfortable but it does leak, leaking around mouth.  He also can sometimes have dry skin from his mask.  He lives and sleeps alone so is unaware of any snoring with the mask on.  He denies any gasping arousals with the mask on.  Pressure settings however do feel low for him.    Respironics Auto-PAP 7-15 cmH2O download (5/3/2023 - 6/1/2023):  26 total days of use. 4 nonuse days.  Average use 8  hours 8 minutes per day.  73.3% days with >4 hours use.  Large leak 1 hour 51 minutes per day average. CPAP 90% pressure 11.7cm. AHI 15.4 (obstructive apnea index 5.0, central apnea index 1.5, hypopnea index 8.9)    SLEEP-WAKE SCHEDULE:     Work/School Days: Patient goes to school/work: No  Usually gets into bed at 11:00PM  Takes patient about 1 minute to fall asleep  Has trouble falling asleep 4 nights per week  Wakes up in the middle of the night 1 time.  Wakes up due to snorting self awake, use the restroom  He often has trouble falling back asleep  It usually takes 1 hour to get back to sleep, watches TV during this time  Patient is usually up at 7:00AM, but often taking nap by 9:00AM  Uses alarm: No    Weekends/Non-work Days/All Other Days:  Same as above     Sleep Need  Patient estimates he gets 8-10 hours sleep on average per day   Patient thinks he needs about 8 hours sleep, but ideally maninder Greenwood prefers to sleep in this position(s): Side     Naps  Patient takes a purposeful nap daily and naps are usually 3-4 hours in duration  He feels better after a nap:  No  He dozes off unintentionally 0 days per week  Patient has had a driving accident or near-miss due to sleepiness/drowsiness: No, not driving    SLEEP DISRUPTIONS:    Breathing/Snoring  Patient snores: Yes, unsure if currently with PAP  Patient has been told he stops breathing in his sleep: Yes  He has issues with the following:  Morning mouth dryness, waking to urinate more than once    Movement:  Patient gets pain, discomfort, with an urge to move:   No  Patient has been told he kicks his legs at night:   No     Behaviors in Sleep:  Denies sleepwalking, sleep talking, sleep eating, bruxism, dream enactment, recurring nightmares, night terrors, sleep paralysis, hypnagogic/hypnopompic hallucinations, cataplexy    CAFFEINE AND OTHER SUBSTANCES:    Patient consumes caffeinated beverages per day:  3-4 coffee/pop  Last caffeine use is  usually: afternoon  List of any prescribed or over the counter stimulants that patient takes:  None   List of any prescribed or over the counter sleep medication patient takes:  none  List of previous sleep medications that patient has tried:  Melatonin  Patient drinks alcohol to help them sleep:  No  Patient drinks alcohol near bedtime:  No    Family History:  Patient has a family member been diagnosed with a sleep disorder:  No    SCALES:    EPWORTH SLEEPINESS SCALE         6/2/2023    11:00 AM    Flushing Sleepiness Scale ( MARIBELL Ennis  1990-1997[facility] [place]/English - Final version - 21 Nov 07 - [facility] [place].)   Flushing Score (Sleep) 0         INSOMNIA SEVERITY INDEX (LORENZA)          6/2/2023    11:00 AM   Insomnia Severity Index (LORENZA)   Difficulty falling asleep 0   Difficulty staying asleep 4   Problems waking up too early 4   How SATISFIED/DISSATISFIED are you with your CURRENT sleep pattern? 4   How NOTICEABLE to others do you think your sleep problem is in terms of impairing the quality of your life? 2   How WORRIED/DISTRESSED are you about your current sleep problem? 0   To what extent do you consider your sleep problem to INTERFERE with your daily functioning (e.g. daytime fatigue, mood, ability to function at work/daily chores, concentration, memory, mood, etc.) CURRENTLY? 3   LORENZA Total Score 17       Guidelines for Scoring/Interpretation:  Total score categories:  0-7 = No clinically significant insomnia   8-14 = Subthreshold insomnia   15-21 = Clinical insomnia (moderate severity)  22-28 = Clinical insomnia (severe)  Used via courtesy of www.[place].va.gov with permission from Kenneth Tinajero PhD., [facility]    GAD7        3/30/2023    11:22 AM   ANDRES-7    1. Feeling nervous, anxious, or on edge 1   2. Not being able to stop or control worrying 1   3. Worrying too much about different things 1   4. Trouble relaxing 0   5. Being so restless that it is hard to sit still 0   6. Becoming easily  annoyed or irritable 0   7. Feeling afraid, as if something awful might happen 1   ANDRES-7 Total Score 4   If you checked any problems, how difficult have they made it for you to do your work, take care of things at home, or get along with other people? Somewhat difficult       PATIENT HEALTH QUESTIONNAIRE-9 (PHQ - 9)        3/30/2023    11:20 AM   PHQ-9 ([employer])   1.  Little interest or pleasure in doing things 0   2.  Feeling down, depressed, or hopeless 1   3.  Trouble falling or staying asleep, or sleeping too much 3   4.  Feeling tired or having little energy 1   5.  Poor appetite or overeating 1   6.  Feeling bad about yourself - or that you are a failure or have let yourself or your family down 1   7.  Trouble concentrating on things, such as reading the newspaper or watching television 1   8.  Moving or speaking so slowly that other people could have noticed. Or the opposite - being so fidgety or restless that you have been moving around a lot more than usual 0   9.  Thoughts that you would be better off dead, or of hurting yourself in some way 0   PHQ-9 Total Score 8   6.  Feeling bad about yourself 1   7.  Trouble concentrating 1   8.  Moving slowly or restless 0   9.  Suicidal or self-harm thoughts 0   1.  Little interest or pleasure in doing things Not at all   2.  Feeling down, depressed, or hopeless Several days   3.  Trouble falling or staying asleep, or sleeping too much Nearly every day   4.  Feeling tired or having little energy Several days   5.  Poor appetite or overeating Several days   6.  Feeling bad about yourself Several days   7.  Trouble concentrating Several days   8.  Moving slowly or restless Not at all   9.  Suicidal or self-harm thoughts Not at all   PHQ-9 via [facility] TOTAL SCORE-----> 8 (Mild depression)   Difficulty at work, home, or with people Somewhat difficult       Developed by Jennifer Ornelas, Dania Holloway, Theodore Villavicencio and colleagues, with an educational catalina  from [employer] No permission required to reproduce, translate, display or distribute.      Allergies:    Allergies   Allergen Reactions     Other [No Clinical Screening - See Comments] Other (See Comments)     Awoke from anesthesia with anger and ripping off dressing, after interstim placement, outside hospital 2013       Medications:    Current Outpatient Medications   Medication Sig Dispense Refill     albuterol (VENTOLIN HFA) 108 (90 Base) MCG/ACT inhaler INHALE 2 PUFFS INTO LUNGS EVERY SIX HOURS AS NEEDED FOR SHORTNESS OF BREATH / DYSPNEA OR WHEEZING 18 g 1     calcium polycarbophil (FIBER-LAX) 625 MG tablet Take 2 tablets (1,250 mg) by mouth 2 times daily 360 tablet 0     DULoxetine (CYMBALTA) 60 MG capsule Take 120 mg by mouth daily        hydrochlorothiazide (HYDRODIURIL) 25 MG tablet Take 1 tablet (25 mg) by mouth daily 30 tablet 7     Lidocaine (LIDOCARE) 4 % Patch Place 1-2 patches onto the skin every 24 hours To prevent lidocaine toxicity, patient should be patch free for 12 hrs daily. (Patient taking differently: Place onto the skin every 24 hours To prevent lidocaine toxicity, patient should be patch free for 12 hrs daily.) 60 patch 1     lisinopril (ZESTRIL) 10 MG tablet Take 1 tablet (10 mg) by mouth every morning 30 tablet 7     mirabegron (MYRBETRIQ) 50 MG 24 hr tablet Take 1 tablet by mouth daily       OLANZapine-samidorphan (LYBALVI) 10-10 MG TABS tablet Take 1 tablet by mouth At Bedtime       order for DME Equipment being ordered: CPAP supplies 1 each 0     oxybutynin ER (DITROPAN XL) 15 MG 24 hr tablet Take 30 mg by mouth daily       pantoprazole (PROTONIX) 40 MG EC tablet TAKE 1 TABLET BY MOUTH ONCE DAILY 30-60 MINUTES BR FIRST MEAL OF THE DAY 30 tablet 7     prazosin (MINIPRESS) 1 MG capsule Take 3 mg by mouth At Bedtime       Semaglutide-Weight Management (WEGOVY) 1.7 MG/0.75ML pen Inject 1.7 mg Subcutaneous once a week 3 mL 1     vitamin B complex with vitamin C (VITAMIN  B COMPLEX) tablet  Take 1 tablet by mouth daily       VITAMIN D3 50 MCG (2000 UT) tablet TAKE ONE TABLET BY MOUTH EVERY DAY 90 tablet 3     Semaglutide-Weight Management (WEGOVY) 1 MG/0.5ML pen Inject 1 mg Subcutaneous once a week 2 mL 3       Problem List:  Patient Active Problem List    Diagnosis Date Noted     Mood change 04/25/2022     Priority: Medium     Severe recurrent major depressive disorder with psychotic features (H) 10/06/2021     Priority: Medium     Alcohol use disorder, mild, abuse 10/06/2021     Priority: Medium     Cannabis use disorder, mild, abuse 10/06/2021     Priority: Medium     Venous stasis dermatitis of both lower extremities 08/27/2021     Priority: Medium     Duodenitis 09/30/2020     Priority: Medium     Added automatically from request for surgery [id number]       Somatic dysfunction of lumbar region 10/13/2019     Priority: Medium     Somatic dysfunction of sacral region 10/13/2019     Priority: Medium     Sacral pain 10/13/2019     Priority: Medium     Thoracic segment dysfunction 10/13/2019     Priority: Medium     Hip pain, left 10/13/2019     Priority: Medium     Mild intermittent asthma 05/08/2017     Priority: Medium     Patel's esophagus determined by biopsy 03/27/2017     Priority: Medium     Next upper GI endoscopy (EGD) 11/2019       Vitamin D deficiency 03/20/2017     Priority: Medium     LPRD (laryngopharyngeal reflux disease) 03/20/2017     Priority: Medium     S/P total hip arthroplasty 01/25/2017     Priority: Medium     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     Priority: Medium     History of colonic polyps 09/28/2016     Priority: Medium     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 None 11/2016. Next colonoscopy 11/2021 if not sooner.       Overactive bladder 11/06/2015     Priority: Medium     Bilateral low back pain with left-sided sciatica 10/08/2015     Priority: Medium     Chronic low back pain 10/06/2015     Priority: Medium     Suicidal ideation 08/23/2015      Priority: Medium     Leukocytosis 06/09/2014     Priority: Medium     Class 3 severe obesity with serious comorbidity and body mass index (BMI) of 50.0 to 59.9 in adult (H) 05/20/2014     Priority: Medium     Essential hypertension 02/03/2014     Priority: Medium     LOREN (obstructive sleep apnea) 04/23/2013     Priority: Medium     CPAP       Moderate major depression (H) 01/15/2013     Priority: Medium     Speech/language delay 11/11/2010     Priority: Medium     Tobacco use disorder 11/11/2010     Priority: Medium     Nocturnal enuresis      Priority: Medium        Past Medical/Surgical History:  Past Medical History:   Diagnosis Date     Arthritis     DDD hips and knees     Asthma      Asthma      Patel's esophagus      Chronic pain      Chronic pain - left hip/leg pain     degenerative disc disease, back, hips, knees     Gastroesophageal reflux disease      History of anesthesia complications     agitation x1 after interstim 2013     Hypertension      Hypertension      Leukocytosis      LPRD (laryngopharyngeal reflux disease)      Marijuana abuse      Marijuana abuse      MDD (major depressive disorder)      MDD (major depressive disorder)      Mental retardation, mild (I.Q. 50-70)      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    With associated speech/language delay     Obesity 11/11/2010     LOREN (obstructive sleep apnea)     Uses a CPAP     LOREN (obstructive sleep apnea)      Seborrheic dermatitis      Seborrheic dermatitis      Sleep apnea     CPAP     Past Surgical History:   Procedure Laterality Date     ARTHROPLASTY HIP Left 1/25/2017    Procedure: ARTHROPLASTY HIP;  Surgeon: Bala Randall MD;  Location: [facility] OR     ARTHROPLASTY HIP Left      ARTHROPLASTY REVISION HIP Left 6/24/2019    Procedure: Revision left total hip arthroplasty with a head and liner exchange to a Biomet dual mobility head and liner.;  Surgeon: Bala Randall MD;  Location: [facility] OR     BLADDER SURGERY       Fred, Alida,Interstem stimulator implant     CLOSED REDUCTION HIP Left 6/10/2019    Procedure: Closed reduction under general anesthesia left recurrent prosthetic hip dislocation;  Surgeon: Rafat Cazares MD;  Location: [facility] OR     COLONOSCOPY N/A 10/30/2015    Procedure: COMBINED COLONOSCOPY, SINGLE OR MULTIPLE BIOPSY/POLYPECTOMY BY BIOPSY;  Surgeon: Alexis Jackson MD;  Location: [facility] GI     COLONOSCOPY N/A 11/17/2016    Procedure: COMBINED COLONOSCOPY, SINGLE OR MULTIPLE BIOPSY/POLYPECTOMY BY BIOPSY;  Surgeon: Cat Huber MD;  Location: [facility]     COLONOSCOPY N/A 10/28/2020    Procedure: COLONOSCOPY;  Surgeon: You Kraus MD;  Location: [facility] OR     COLONOSCOPY       ESOPHAGOSCOPY, GASTROSCOPY, DUODENOSCOPY (EGD), COMBINED N/A 11/17/2016    Procedure: COMBINED ESOPHAGOSCOPY, GASTROSCOPY, DUODENOSCOPY (EGD), BIOPSY SINGLE OR MULTIPLE;  Surgeon: Cat Huber MD;  Location: [facility]     ESOPHAGOSCOPY, GASTROSCOPY, DUODENOSCOPY (EGD), COMBINED N/A 3/12/2020    Procedure: ESOPHAGOGASTRODUODENOSCOPY WITH BIOPSIES;  Surgeon: You Kraus MD;  Location: [facility]     ESOPHAGOSCOPY, GASTROSCOPY, DUODENOSCOPY (EGD), COMBINED N/A 10/28/2020    Procedure: ESOPHAGOGASTRODUODENOSCOPY, WITH BIOPSY;  Surgeon: You Kraus MD;  Location: [facility] OR     ESOPHAGOSCOPY, GASTROSCOPY, DUODENOSCOPY (EGD), COMBINED       EXAM UNDER ANESTHESIA, FULGURATE CONDYLOMA ANUS, COMBINED N/A 12/22/2016    Procedure: COMBINED EXAM UNDER ANESTHESIA, FULGURATE CONDYLOMA ANUS;  Surgeon: Nya Cabello MD;  Location: [facility]     GENITOURINARY SURGERY       JOINT REPLACEMENT Left 2017    MARGO, Revision Left MARGO     OTHER SURGICAL HISTORY      interstim stimulator implant     AZ CYSTOURETHROSCOPY INJ CHEMODENERVATION BLADDER N/A 1/16/2019    Procedure: CYSTOSCOPY BOTOX BLADDER INJECTIONS (100 UNITS IN 10CC);  Surgeon: Didier Ibarra MD;  Location: [facility];  Service: Urology     AZ IMPLANT  "PERIPH/GASTRIC NEUROSTIM/ N/A 1/8/2020    Procedure: NEW INTERSTIM LEAD, REPLACE OLD; NEW INTERSTIM BATTERY, REPLACE OLD;  Surgeon: Didier Ibarra MD;  Location: [facility] OR;  Service: Urology       Social History:  Social History     Socioeconomic History     Marital status: Single     Spouse name: Not on file     Number of children: Not on file     Years of education: Not on file     Highest education level: Not on file   Occupational History     Not on file   Tobacco Use     Smoking status: Every Day     Packs/day: 1.00     Years: 1.00     Pack years: 1.00     Types: Vaping Device, Cigarettes     Smokeless tobacco: Current     Types: Chew     Tobacco comments:     Smokes E Cigs equal to 1 PPD   Vaping Use     Vaping status: Every Day     Substances: Nicotine, Flavoring     Devices: Refillable tank   Substance and Sexual Activity     Alcohol use: Yes     Comment: socially--\"not very often\" \"once a month\"     Drug use: No     Comment: patient denies any marijuana use     Sexual activity: Yes     Partners: Female     Birth control/protection: Condom   Other Topics Concern     Parent/sibling w/ CABG, MI or angioplasty before 65F 55M? No   Social History Narrative     Not on file     Social Determinants of Health     Financial Resource Strain: Not on file   Food Insecurity: Not on file   Transportation Needs: Not on file   Physical Activity: Not on file   Stress: Not on file   Social Connections: Not on file   Intimate Partner Violence: Not At Risk (12/30/2022)    Humiliation, Afraid, Rape, and Kick questionnaire      Fear of Current or Ex-Partner: No      Emotionally Abused: No      Physically Abused: No      Sexually Abused: No   Housing Stability: Not on file       Family History:  Family History   Problem Relation Age of Onset     Anxiety Disorder Mother      Depression Mother      Ulcerative Colitis Mother 18     Breast Cancer Maternal Grandmother      Cerebrovascular Disease Maternal Grandmother "      Breast Cancer Maternal Aunt      Cancer Maternal Grandfather         grt uncles colon cancer       Review of Systems:  Positive for dry mouth in the morning, shortness of breath with activity, nocturnal enuresis    Physical Examination:  Vitals: /67   Pulse 80   Ht 1.829 m (6')   Wt (!) 161.9 kg (357 lb)   SpO2 96%   BMI 48.42 kg/m    BMI= Body mass index is 48.42 kg/m .  General appearance: Awake, alert, cooperative. Well groomed. Sitting comfortably in chair. In no apparent distress.  HEENT: Head: Normocephalic, atraumatic. Eyes: PERRL. Conjunctiva clear. Sclera normal. Nose: External appearance normal.  Neck: Neck Cir (cm): 46 cm (6/2/2023 11:00 AM)  Skin: No rashes or significant lesions.   Neurologic: Alert, oriented x3. No focal neurological deficit.   Psychiatric: Mood euthymic. Affect congruent with full range and intensity.         Data: All pertinent previous laboratory data reviewed     Recent Labs   Lab Test 03/07/23  1156 01/09/23  1620    138   POTASSIUM 4.4 4.1   CHLORIDE 102 101   CO2 30* 28   ANIONGAP 11 9   GLC 92 89   BUN 15.8 13.9   CR 1.08 0.94   FILEMON 10.0 9.6       Recent Labs   Lab Test 03/30/23  1217   WBC 17.6*   RBC 4.60   HGB 13.4   HCT 38.9*   MCV 85   MCH 29.1   MCHC 34.4   RDW 12.9          Recent Labs   Lab Test 03/07/23  1156   PROTTOTAL 7.3   ALBUMIN 4.1   BILITOTAL 0.3   ALKPHOS 77   AST 18   ALT 25       TSH   Date Value   03/30/2023 1.09 uIU/mL   07/21/2022 0.67 mU/L   07/13/2018 2.86 mU/L   03/20/2017 1.90 mU/L       Amphetamine Qual Urine (no units)   Date Value   01/25/2016     Negative   Cutoff for a negative amphetamine is 500 ng/mL or less.       Amphetamines Urine (no units)   Date Value   04/24/2022 Screen Negative     Barbiturates Qual Urine (no units)   Date Value   01/25/2016     Negative   Cutoff for a negative barbiturate is 200 ng/mL or less.       Barbiturates Urine (no units)   Date Value   04/24/2022 Screen Negative     Benzodiazepine  Qual Urine (no units)   Date Value   01/25/2016     Negative   Cutoff for a negative benzodiazepine is 200 ng/mL or less.       Cannabinoids Qual Urine (no units)   Date Value   01/25/2016 (A)    Positive   Cutoff for a positive cannabinoid is greater than 50 ng/mL. This is an   unconfirmed screening result to be used for medical purposes only.       Cannabinoids Urine (no units)   Date Value   04/24/2022 Screen Negative     Cocaine Qual Urine (no units)   Date Value   01/25/2016     Negative   Cutoff for a negative cocaine is 300 ng/mL or less.       Cocaine Urine (no units)   Date Value   04/24/2022 Screen Negative     Opiates Qualitative Urine (no units)   Date Value   01/25/2016     Negative   Cutoff for a negative opiate is 300 ng/mL or less.       Opiates Urine (no units)   Date Value   04/24/2022 Screen Negative     PCP Qual Urine (no units)   Date Value   01/25/2016     Negative   Cutoff for a negative PCP is 25 ng/mL or less.         Iron Saturation Index   Date/Time Value Ref Range Status   12/31/2018 11:28 AM 35 15 - 46 % Final     Iron Sat Index   Date/Time Value Ref Range Status   07/21/2022 09:50 AM 40 15 - 46 % Final     Ferritin   Date/Time Value Ref Range Status   07/21/2022 09:50  26 - 388 ng/mL Final   12/31/2018 11:28 AM 90 26 - 388 ng/mL Final       No results found for: PH, PHARTERIAL, PO2, [id number], SAT, PCO2, HCO3, BASEEXCESS, VIRGILIO, BEB    @LABRCNTIPR(phv:4,pco2v:4,po2v:4,hco3v:4,patricia:4,o2per:4)@    Echocardiology 5/8/23:  Interpretation Summary     Left ventricular size, wall motion and function are normal. The ejection  fraction is 60-65%.  Normal right ventricle size and systolic function.  There is no pericardial effusion.  Fat pad noted.  No hemodynamically significant valvular abnormalities on 2D or color flow  imaging. The study was technically difficult.      Chest x-ray: No results found for this or any previous visit from the past 365 days.      Chest CT: No results found for  this or any previous visit from the past 365 days.      PFT: Most Recent [facility] Pulmonary Function Testing    No results found for: 20001  No results found for: 20002  No results found for: 20003  No results found for: 20015  No results found for: 20016  No results found for: 20027  No results found for: 20028  No results found for: 20029  No results found for: 20079  No results found for: 20080  No results found for: 20081  No results found for: 20335  No results found for: 20105  No results found for: 20053  No results found for: 20054  No results found for: 20055      Radha Shah PA-C 6/2/2023     [facility]  [address] [address]     [facility]  [address] [address]    Chart documentation was completed, in part, with [employer] voice-recognition software. Even though reviewed, some grammatical, spelling, and word errors may remain.    60 minutes spent on day of encounter reviewing medical records, history and physical examination, review of previous testing and interpretation, documentation and further activities as noted above

## 2023-06-02 NOTE — NURSING NOTE
Chief Complaint   Patient presents with     Sleep Problem     LOREN, establish care        Initial /67   Pulse 80   Ht 1.829 m (6')   Wt (!) 161.9 kg (357 lb)   SpO2 96%   BMI 48.42 kg/m   Estimated body mass index is 48.42 kg/m  as calculated from the following:    Height as of this encounter: 1.829 m (6').    Weight as of this encounter: 161.9 kg (357 lb).    Medication Reconciliation: complete    Neck circumference: 18.25 inches / 46 centimeters.  ESS  LORENZA  DME: PEMA Patel MA

## 2023-06-03 DIAGNOSIS — E66.01 CLASS 3 SEVERE OBESITY DUE TO EXCESS CALORIES WITH SERIOUS COMORBIDITY AND BODY MASS INDEX (BMI) OF 50.0 TO 59.9 IN ADULT (H): ICD-10-CM

## 2023-06-03 DIAGNOSIS — E66.813 CLASS 3 SEVERE OBESITY DUE TO EXCESS CALORIES WITH SERIOUS COMORBIDITY AND BODY MASS INDEX (BMI) OF 50.0 TO 59.9 IN ADULT (H): ICD-10-CM

## 2023-06-05 DIAGNOSIS — K59.01 SLOW TRANSIT CONSTIPATION: ICD-10-CM

## 2023-06-05 RX ORDER — CALCIUM POLYCARBOPHIL 625 MG/1
2 TABLET, FILM COATED ORAL 2 TIMES DAILY
Qty: 360 TABLET | Refills: 0 | Status: SHIPPED | OUTPATIENT
Start: 2023-06-05 | End: 2024-07-29

## 2023-06-05 NOTE — TELEPHONE ENCOUNTER
Routing refill request to provider for review/approval because:  No protocol for RN review.     Traci Acosta R.N.

## 2023-06-06 ENCOUNTER — CARE COORDINATION (OUTPATIENT)
Dept: SLEEP MEDICINE | Facility: CLINIC | Age: 43
End: 2023-06-06
Payer: COMMERCIAL

## 2023-06-06 ENCOUNTER — TRANSFERRED RECORDS (OUTPATIENT)
Dept: HEALTH INFORMATION MANAGEMENT | Facility: CLINIC | Age: 43
End: 2023-06-06
Payer: COMMERCIAL

## 2023-06-06 NOTE — PROGRESS NOTES
DME/Cpap supply orders and chart notes faxed to [facility] at fax. [phone];Ph. [phone].  Monisha Patel MA     ROS  Constitutional:  See HPI.  Eyes:  No visual changes, eye pain or discharge.  ENMT:  No hearing changes, pain, discharge or infections. No neck pain or stiffness.  Cardiac:  No chest pain, SOB or edema. No chest pain with exertion.  Respiratory:  No cough or respiratory distress. No hemoptysis.  GI:  + diarrhea and abdominal pain.  :  No dysuria, frequency or burning.  MS:  No myalgia, muscle weakness, joint pain or back pain.  Neuro:  No focal neurological complaints.  Skin:  No skin rash.  Except as documented in the HPI,  all other systems are negative.

## 2023-06-06 NOTE — TELEPHONE ENCOUNTER
Semaglutide-Weight Management (WEGOVY) 1.7 MG/0.75ML pen      Last Written Prescription Date:  5/9/23  Last Fill Quantity: 3ml,   # refills: 1  Last Office Visit : 3/13/23  Future Office visit:  6/8/23    Routing refill request to provider for review/approval because:  Pt has an appt in 2 days, provider may increase dose at that time. See last OV notes below:    3/13/23:    Transitioned to Wegovy 1.0mg without concerns. No side effects. Has been helping with hunger and portion sizes. Has lost 6lbs since last visit. Will continue at this dose. Can discuss increasing at next visit.     Spoke with pt's care coordinator, pt has one dose left. Refused, pt to discuss at next office visit on 6/8/23.

## 2023-06-08 ENCOUNTER — OFFICE VISIT (OUTPATIENT)
Dept: ENDOCRINOLOGY | Facility: CLINIC | Age: 43
End: 2023-06-08
Payer: COMMERCIAL

## 2023-06-08 VITALS
OXYGEN SATURATION: 97 % | BODY MASS INDEX: 42.66 KG/M2 | HEART RATE: 78 BPM | HEIGHT: 72 IN | DIASTOLIC BLOOD PRESSURE: 77 MMHG | SYSTOLIC BLOOD PRESSURE: 121 MMHG | WEIGHT: 315 LBS

## 2023-06-08 DIAGNOSIS — E66.01 CLASS 3 SEVERE OBESITY DUE TO EXCESS CALORIES WITH SERIOUS COMORBIDITY AND BODY MASS INDEX (BMI) OF 50.0 TO 59.9 IN ADULT (H): ICD-10-CM

## 2023-06-08 DIAGNOSIS — E66.813 CLASS 3 SEVERE OBESITY DUE TO EXCESS CALORIES WITH SERIOUS COMORBIDITY AND BODY MASS INDEX (BMI) OF 50.0 TO 59.9 IN ADULT (H): ICD-10-CM

## 2023-06-08 PROCEDURE — 99213 OFFICE O/P EST LOW 20 MIN: CPT

## 2023-06-08 ASSESSMENT — PAIN SCALES - GENERAL: PAINLEVEL: SEVERE PAIN (7)

## 2023-06-08 NOTE — NURSING NOTE
(   Chief Complaint   Patient presents with     Follow Up    )    ( Weight: (!) 387 lb 1.6 oz )  ( Height: 6' )  ( BMI (Calculated): 52.5 )  (   )  (   )  (   )  (   )  (   )  (   )    ( BP: 121/77 )  (   )  (   )  (   )  ( Pulse: 78 )  (   )  ( SpO2: 97 % )    (   Patient Active Problem List   Diagnosis     Speech/language delay     Tobacco use disorder     Nocturnal enuresis     Moderate major depression (H)     LOREN (obstructive sleep apnea)     Essential hypertension     Class 3 severe obesity with serious comorbidity and body mass index (BMI) of 50.0 to 59.9 in adult (H)     Leukocytosis     Suicidal ideation     Chronic low back pain     Bilateral low back pain with left-sided sciatica     History of colonic polyps     Mild intellectual disability     S/P total hip arthroplasty     Vitamin D deficiency     LPRD (laryngopharyngeal reflux disease)     Patel's esophagus determined by biopsy     Mild intermittent asthma     Somatic dysfunction of lumbar region     Somatic dysfunction of sacral region     Sacral pain     Thoracic segment dysfunction     Hip pain, left     Duodenitis     Overactive bladder     Venous stasis dermatitis of both lower extremities     Severe recurrent major depressive disorder with psychotic features (H)     Alcohol use disorder, mild, abuse     Cannabis use disorder, mild, abuse     Mood change    )  (   Current Outpatient Medications   Medication Sig Dispense Refill     albuterol (VENTOLIN HFA) 108 (90 Base) MCG/ACT inhaler INHALE 2 PUFFS INTO LUNGS EVERY SIX HOURS AS NEEDED FOR SHORTNESS OF BREATH / DYSPNEA OR WHEEZING 18 g 1     calcium polycarbophil (FIBER-LAX) 625 MG tablet Take 2 tablets (1,250 mg) by mouth 2 times daily 360 tablet 0     DULoxetine (CYMBALTA) 60 MG capsule Take 120 mg by mouth daily        hydrochlorothiazide (HYDRODIURIL) 25 MG tablet Take 1 tablet (25 mg) by mouth daily 30 tablet 7     Lidocaine (LIDOCARE) 4 % Patch Place 1-2 patches onto the skin every 24  hours To prevent lidocaine toxicity, patient should be patch free for 12 hrs daily. (Patient taking differently: Place onto the skin every 24 hours To prevent lidocaine toxicity, patient should be patch free for 12 hrs daily.) 60 patch 1     lisinopril (ZESTRIL) 10 MG tablet Take 1 tablet (10 mg) by mouth every morning 30 tablet 7     mirabegron (MYRBETRIQ) 50 MG 24 hr tablet Take 1 tablet by mouth daily       OLANZapine-samidorphan (LYBALVI) 10-10 MG TABS tablet Take 1 tablet by mouth At Bedtime       order for DME Equipment being ordered: CPAP supplies 1 each 0     oxybutynin ER (DITROPAN XL) 15 MG 24 hr tablet Take 30 mg by mouth daily       pantoprazole (PROTONIX) 40 MG EC tablet TAKE 1 TABLET BY MOUTH ONCE DAILY 30-60 MINUTES BR FIRST MEAL OF THE DAY 30 tablet 7     prazosin (MINIPRESS) 1 MG capsule Take 3 mg by mouth At Bedtime       Semaglutide-Weight Management (WEGOVY) 1.7 MG/0.75ML pen Inject 1.7 mg Subcutaneous once a week 3 mL 1     vitamin B complex with vitamin C (VITAMIN  B COMPLEX) tablet Take 1 tablet by mouth daily       VITAMIN D3 50 MCG (2000 UT) tablet TAKE ONE TABLET BY MOUTH EVERY DAY 90 tablet 3    )  ( Diabetes Eval:    )    ( Pain Eval:  Severe Pain (7) )    ( Wound Eval:       )    (   History   Smoking Status     Every Day     Packs/day: 1.00     Years: 1.00     Types: Vaping Device, Cigarettes   Smokeless Tobacco     Current     Types: Chew    )    ( Signed By:  SADAF Velasquez; June 8, 2023; 2:12 PM )

## 2023-06-08 NOTE — LETTER
2023       RE: Kimo Greenwood   [address]  [address]  [facility] [address]     Dear Colleague,    Thank you for referring your patient, Kimo Greenwood, to the [facility] [place] [facility]. Please see a copy of my visit note below.      Return Medical Weight Management Note     Kimo Greenwood  MRN:  [id number]  :  1980  SALEEM:  2023    Dear Ciro Love MD,    I had the pleasure of seeing your patient Kimo Greenwood. He is a 42 year old male who I am continuing to see for treatment of obesity related to:        2023     1:43 PM   --   I have the following health issues associated with obesity None of the above       Assessment & Plan   Problem List Items Addressed This Visit          Digestive    Class 3 severe obesity with serious comorbidity and body mass index (BMI) of 50.0 to 59.9 in adult (H)    Relevant Medications    Semaglutide-Weight Management (WEGOVY) 1.7 MG/0.75ML pen      Continue Wegovy 1.7mg for once weekly. Refills sent. Monitor for any increase side effects of vomiting and fatigue   Follow up with Nya Camarena in 3 months     INTERVAL HISTORY:  First seen for NBS - 2023  Weight loss required for hip replacement <330lbs  Transitioned to Wegovy 1.0mg     Increased to Wegovy 1.7mg       Anti-obesity medications:     Current:   Wegovy 1.7mg - has taken 7 doses. Takes injections on Wednesday. Since dose increase has vomiting twice after dinner - egg dish and pulled pork. Does not over eat. Mild nausea that has improved with continued use. Has had 2 episodes of constipation, but has missed his fiber 15x this month. Continues to have mild fatigue. Helping with hunger.        Recent diet changes: Eating 1-2 meals a day, with 1 snacks. Has prepared snacks - nuts, cheese + meat. Has increased protein. Only drinking 1 16oz bottles regular pepsi.     Recent exercise/activity changes:  limited to hip pain     Recent stressors: No concerns.     Recent sleep changes: Still feeling some fatigue. Will nap during the day and awake at night.       CURRENT WEIGHT:   387 lbs 1.6 oz    Initial Weight (lbs): 407.6 lbs     Cumulative weight loss (lbs): 20.5  Weight Loss Percentage: 5.03%     Wt Readings from Last 5 Encounters:   06/08/23 (!) 175.6 kg (387 lb 1.6 oz)   06/02/23 (!) 161.9 kg (357 lb)   04/04/23 (!) 184.6 kg (407 lb)   03/30/23 (!) 181.4 kg (400 lb)   03/22/23 (!) 184.1 kg (405 lb 14.4 oz)             6/8/2023     1:57 PM   Changes and Difficulties   I have made the following changes to my diet since my last visit: more protein  and less pop   With regards to my diet, I am still struggling with: pop and eating at consistent times   I have made the following changes to my activity/exercise since my last visit: none   With regards to my activity/exercise, I am still struggling with: pain and fatigue         MEDICATIONS:   Current Outpatient Medications   Medication Sig Dispense Refill    albuterol (VENTOLIN HFA) 108 (90 Base) MCG/ACT inhaler INHALE 2 PUFFS INTO LUNGS EVERY SIX HOURS AS NEEDED FOR SHORTNESS OF BREATH / DYSPNEA OR WHEEZING 18 g 1    calcium polycarbophil (FIBER-LAX) 625 MG tablet Take 2 tablets (1,250 mg) by mouth 2 times daily 360 tablet 0    DULoxetine (CYMBALTA) 60 MG capsule Take 120 mg by mouth daily       hydrochlorothiazide (HYDRODIURIL) 25 MG tablet Take 1 tablet (25 mg) by mouth daily 30 tablet 7    Lidocaine (LIDOCARE) 4 % Patch Place 1-2 patches onto the skin every 24 hours To prevent lidocaine toxicity, patient should be patch free for 12 hrs daily. (Patient taking differently: Place onto the skin every 24 hours To prevent lidocaine toxicity, patient should be patch free for 12 hrs daily.) 60 patch 1    lisinopril (ZESTRIL) 10 MG tablet Take 1 tablet (10 mg) by mouth every morning 30 tablet 7    mirabegron (MYRBETRIQ) 50 MG 24 hr tablet Take 1 tablet by mouth daily       OLANZapine-samidorphan (LYBALVI) 10-10 MG TABS tablet Take 1 tablet by mouth At Bedtime      order for DME Equipment being ordered: CPAP supplies 1 each 0    oxybutynin ER (DITROPAN XL) 15 MG 24 hr tablet Take 30 mg by mouth daily      pantoprazole (PROTONIX) 40 MG EC tablet TAKE 1 TABLET BY MOUTH ONCE DAILY 30-60 MINUTES BR FIRST MEAL OF THE DAY 30 tablet 7    prazosin (MINIPRESS) 1 MG capsule Take 3 mg by mouth At Bedtime      Semaglutide-Weight Management (WEGOVY) 1.7 MG/0.75ML pen Inject 1.7 mg Subcutaneous once a week 9 mL 1    vitamin B complex with vitamin C (VITAMIN  B COMPLEX) tablet Take 1 tablet by mouth daily      VITAMIN D3 50 MCG (2000 UT) tablet TAKE ONE TABLET BY MOUTH EVERY DAY 90 tablet 3           6/8/2023     1:57 PM   Weight Loss Medication History Reviewed With Patient   Which weight loss medications are you currently taking on a regular basis? Wegovy   Are you having any side effects from the weight loss medication that we have prescribed you? No           Objective    /77 (BP Location: Right arm, Patient Position: Sitting, Cuff Size: Adult Large)   Pulse 78   Ht 1.829 m (6')   Wt (!) 175.6 kg (387 lb 1.6 oz)   SpO2 97%   BMI 52.50 kg/m    PHYSICAL EXAM:  GENERAL: Healthy, alert and no distress  EYES: Eyes grossly normal to inspection.  No discharge or erythema, or obvious scleral/conjunctival abnormalities.  RESP: No audible wheeze, cough, or visible cyanosis.  No visible retractions or increased work of breathing.    SKIN: Visible skin clear. No significant rash, abnormal pigmentation or lesions.  NEURO: Cranial nerves grossly intact.  Mentation and speech appropriate for age.  PSYCH: Mentation appears normal, affect normal/bright, judgement and insight intact, normal speech and appearance well-groomed.        Sincerely,    MONA ONOFRE PA-C      27 minutes spent by me on the date of the encounter doing chart review, history and exam, documentation and further activities per the  note

## 2023-06-08 NOTE — PROGRESS NOTES
Return Medical Weight Management Note     Kimo Greenwood  MRN:  [id number]  :  1980  SALEEM:  2023    Dear Ciro Love MD,    I had the pleasure of seeing your patient Kimo Greenwood. He is a 42 year old male who I am continuing to see for treatment of obesity related to:        2023     1:43 PM   --   I have the following health issues associated with obesity None of the above       Assessment & Plan   Problem List Items Addressed This Visit        Digestive    Class 3 severe obesity with serious comorbidity and body mass index (BMI) of 50.0 to 59.9 in adult (H)    Relevant Medications    Semaglutide-Weight Management (WEGOVY) 1.7 MG/0.75ML pen      1. Continue Wegovy 1.7mg for once weekly. Refills sent. Monitor for any increase side effects of vomiting and fatigue   2. Follow up with Nya Camarena in 3 months     INTERVAL HISTORY:  First seen for NBS - 2023  Weight loss required for hip replacement <330lbs  Transitioned to Wegovy 1.0mg     Increased to Wegovy 1.7mg       Anti-obesity medications:     Current:   Wegovy 1.7mg - has taken 7 doses. Takes injections on Wednesday. Since dose increase has vomiting twice after dinner - egg dish and pulled pork. Does not over eat. Mild nausea that has improved with continued use. Has had 2 episodes of constipation, but has missed his fiber 15x this month. Continues to have mild fatigue. Helping with hunger.        Recent diet changes: Eating 1-2 meals a day, with 1 snacks. Has prepared snacks - nuts, cheese + meat. Has increased protein. Only drinking 1 16oz bottles regular pepsi.     Recent exercise/activity changes: limited to hip pain     Recent stressors: No concerns.     Recent sleep changes: Still feeling some fatigue. Will nap during the day and awake at night.       CURRENT WEIGHT:   387 lbs 1.6 oz    Initial Weight (lbs): 407.6 lbs     Cumulative weight loss (lbs): 20.5  Weight Loss Percentage: 5.03%     Wt Readings from Last 5 Encounters:    06/08/23 (!) 175.6 kg (387 lb 1.6 oz)   06/02/23 (!) 161.9 kg (357 lb)   04/04/23 (!) 184.6 kg (407 lb)   03/30/23 (!) 181.4 kg (400 lb)   03/22/23 (!) 184.1 kg (405 lb 14.4 oz)             6/8/2023     1:57 PM   Changes and Difficulties   I have made the following changes to my diet since my last visit: more protein  and less pop   With regards to my diet, I am still struggling with: pop and eating at consistent times   I have made the following changes to my activity/exercise since my last visit: none   With regards to my activity/exercise, I am still struggling with: pain and fatigue         MEDICATIONS:   Current Outpatient Medications   Medication Sig Dispense Refill     albuterol (VENTOLIN HFA) 108 (90 Base) MCG/ACT inhaler INHALE 2 PUFFS INTO LUNGS EVERY SIX HOURS AS NEEDED FOR SHORTNESS OF BREATH / DYSPNEA OR WHEEZING 18 g 1     calcium polycarbophil (FIBER-LAX) 625 MG tablet Take 2 tablets (1,250 mg) by mouth 2 times daily 360 tablet 0     DULoxetine (CYMBALTA) 60 MG capsule Take 120 mg by mouth daily        hydrochlorothiazide (HYDRODIURIL) 25 MG tablet Take 1 tablet (25 mg) by mouth daily 30 tablet 7     Lidocaine (LIDOCARE) 4 % Patch Place 1-2 patches onto the skin every 24 hours To prevent lidocaine toxicity, patient should be patch free for 12 hrs daily. (Patient taking differently: Place onto the skin every 24 hours To prevent lidocaine toxicity, patient should be patch free for 12 hrs daily.) 60 patch 1     lisinopril (ZESTRIL) 10 MG tablet Take 1 tablet (10 mg) by mouth every morning 30 tablet 7     mirabegron (MYRBETRIQ) 50 MG 24 hr tablet Take 1 tablet by mouth daily       OLANZapine-samidorphan (LYBALVI) 10-10 MG TABS tablet Take 1 tablet by mouth At Bedtime       order for DME Equipment being ordered: CPAP supplies 1 each 0     oxybutynin ER (DITROPAN XL) 15 MG 24 hr tablet Take 30 mg by mouth daily       pantoprazole (PROTONIX) 40 MG EC tablet TAKE 1 TABLET BY MOUTH ONCE DAILY 30-60 MINUTES  BR FIRST MEAL OF THE DAY 30 tablet 7     prazosin (MINIPRESS) 1 MG capsule Take 3 mg by mouth At Bedtime       Semaglutide-Weight Management (WEGOVY) 1.7 MG/0.75ML pen Inject 1.7 mg Subcutaneous once a week 9 mL 1     vitamin B complex with vitamin C (VITAMIN  B COMPLEX) tablet Take 1 tablet by mouth daily       VITAMIN D3 50 MCG (2000 UT) tablet TAKE ONE TABLET BY MOUTH EVERY DAY 90 tablet 3           6/8/2023     1:57 PM   Weight Loss Medication History Reviewed With Patient   Which weight loss medications are you currently taking on a regular basis? Wegovy   Are you having any side effects from the weight loss medication that we have prescribed you? No           Objective    /77 (BP Location: Right arm, Patient Position: Sitting, Cuff Size: Adult Large)   Pulse 78   Ht 1.829 m (6')   Wt (!) 175.6 kg (387 lb 1.6 oz)   SpO2 97%   BMI 52.50 kg/m    PHYSICAL EXAM:  GENERAL: Healthy, alert and no distress  EYES: Eyes grossly normal to inspection.  No discharge or erythema, or obvious scleral/conjunctival abnormalities.  RESP: No audible wheeze, cough, or visible cyanosis.  No visible retractions or increased work of breathing.    SKIN: Visible skin clear. No significant rash, abnormal pigmentation or lesions.  NEURO: Cranial nerves grossly intact.  Mentation and speech appropriate for age.  PSYCH: Mentation appears normal, affect normal/bright, judgement and insight intact, normal speech and appearance well-groomed.        Sincerely,    MONA ONOFRE PA-C      27 minutes spent by me on the date of the encounter doing chart review, history and exam, documentation and further activities per the note

## 2023-06-13 NOTE — PROGRESS NOTES
Pre-Visit Planning   Next 5 appointments (look out 90 days)    Jun 19, 2023 11:30 AM  (Arrive by 11:10 AM)  Provider Visit with Ciro Love MD  [facility] ([facility] ) [address] [address] [phone]        Appointment Notes for this encounter:   Follow Up    Questionnaires Reviewed/Assigned  No additional questionnaires are needed      Patient contact not needed. Judy ALEXANDER RN

## 2023-06-19 ENCOUNTER — OFFICE VISIT (OUTPATIENT)
Dept: FAMILY MEDICINE | Facility: CLINIC | Age: 43
End: 2023-06-19
Payer: COMMERCIAL

## 2023-06-19 VITALS
RESPIRATION RATE: 20 BRPM | BODY MASS INDEX: 42.66 KG/M2 | OXYGEN SATURATION: 96 % | DIASTOLIC BLOOD PRESSURE: 75 MMHG | HEART RATE: 83 BPM | WEIGHT: 315 LBS | TEMPERATURE: 98 F | SYSTOLIC BLOOD PRESSURE: 114 MMHG | HEIGHT: 72 IN

## 2023-06-19 DIAGNOSIS — R26.2 DIFFICULTY WALKING: ICD-10-CM

## 2023-06-19 DIAGNOSIS — Z13.9 SCREENING FOR CONDITION: ICD-10-CM

## 2023-06-19 DIAGNOSIS — Z00.00 ENCOUNTER FOR ANNUAL WELLNESS EXAM IN MEDICARE PATIENT: Primary | ICD-10-CM

## 2023-06-19 DIAGNOSIS — Z87.19 HISTORY OF BARRETT'S ESOPHAGUS: ICD-10-CM

## 2023-06-19 DIAGNOSIS — M16.0 OSTEOARTHRITIS OF BOTH HIPS, UNSPECIFIED OSTEOARTHRITIS TYPE: ICD-10-CM

## 2023-06-19 LAB — DEPRECATED CALCIDIOL+CALCIFEROL SERPL-MC: 58 UG/L (ref 20–75)

## 2023-06-19 PROCEDURE — 84443 ASSAY THYROID STIM HORMONE: CPT | Performed by: FAMILY MEDICINE

## 2023-06-19 PROCEDURE — 99214 OFFICE O/P EST MOD 30 MIN: CPT | Mod: 25 | Performed by: FAMILY MEDICINE

## 2023-06-19 PROCEDURE — 82306 VITAMIN D 25 HYDROXY: CPT | Performed by: FAMILY MEDICINE

## 2023-06-19 PROCEDURE — 36415 COLL VENOUS BLD VENIPUNCTURE: CPT | Performed by: FAMILY MEDICINE

## 2023-06-19 PROCEDURE — G0438 PPPS, INITIAL VISIT: HCPCS | Performed by: FAMILY MEDICINE

## 2023-06-19 ASSESSMENT — ASTHMA QUESTIONNAIRES
ACT_TOTALSCORE: 21
QUESTION_5 LAST FOUR WEEKS HOW WOULD YOU RATE YOUR ASTHMA CONTROL: WELL CONTROLLED
QUESTION_3 LAST FOUR WEEKS HOW OFTEN DID YOUR ASTHMA SYMPTOMS (WHEEZING, COUGHING, SHORTNESS OF BREATH, CHEST TIGHTNESS OR PAIN) WAKE YOU UP AT NIGHT OR EARLIER THAN USUAL IN THE MORNING: NOT AT ALL
QUESTION_2 LAST FOUR WEEKS HOW OFTEN HAVE YOU HAD SHORTNESS OF BREATH: ONCE A DAY
QUESTION_4 LAST FOUR WEEKS HOW OFTEN HAVE YOU USED YOUR RESCUE INHALER OR NEBULIZER MEDICATION (SUCH AS ALBUTEROL): NOT AT ALL
QUESTION_1 LAST FOUR WEEKS HOW MUCH OF THE TIME DID YOUR ASTHMA KEEP YOU FROM GETTING AS MUCH DONE AT WORK, SCHOOL OR AT HOME: NONE OF THE TIME
ACT_TOTALSCORE: 21
EMERGENCY_ROOM_LAST_YEAR_TOTAL: ONE

## 2023-06-19 ASSESSMENT — ACTIVITIES OF DAILY LIVING (ADL)
CURRENT_FUNCTION: NEEDS ASSISTANCE

## 2023-06-19 ASSESSMENT — PATIENT HEALTH QUESTIONNAIRE - PHQ9
10. IF YOU CHECKED OFF ANY PROBLEMS, HOW DIFFICULT HAVE THESE PROBLEMS MADE IT FOR YOU TO DO YOUR WORK, TAKE CARE OF THINGS AT HOME, OR GET ALONG WITH OTHER PEOPLE: NOT DIFFICULT AT ALL
SUM OF ALL RESPONSES TO PHQ QUESTIONS 1-9: 2
SUM OF ALL RESPONSES TO PHQ QUESTIONS 1-9: 2

## 2023-06-19 NOTE — PROGRESS NOTES
"  {PROVIDER CHARTING PREFERENCE:866171}    Carline Lamar is a 42 year old, presenting for the following health issues:  Weight Loss (Follow-up meds, weight loss), Annual Visit (Medicare annual wellness  ), and Fatigue (Follow-up fatigue)        6/19/2023    11:19 AM   Additional Questions   Roomed by Kaitlin Berman     History of Present Illness       Reason for visit:  Heart monitor follow up and annual physical  and discussion about vomitting and Berats Esophogus and on going fatigue    He eats 0-1 servings of fruits and vegetables daily.He consumes 3 sweetened beverage(s) daily.He exercises with enough effort to increase his heart rate 9 or less minutes per day.  He exercises with enough effort to increase his heart rate 3 or less days per week. He is missing 1 dose(s) of medications per week.    Today's PHQ-9         PHQ-9 Total Score: 2    PHQ-9 Q9 Thoughts of better off dead/self-harm past 2 weeks :   Not at all    How difficult have these problems made it for you to do your work, take care of things at home, or get along with other people: Not difficult at all       Annual Wellness Visit    Patient has been advised of split billing requirements and indicates understanding: Yes     Are you in the first 12 months of your Medicare Part B coverage?  No    Physical Health:    In general, how would you rate your overall physical health? fair    Outside of work, how many days during the week do you exercise?2-3 days/week    Outside of work, approximately how many minutes a day do you exercise?15-30 minutes  If you drink alcohol do you typically have >3 drinks per day or >7 drinks per week? Yes - AUDIT SCORE:           Do you usually eat at least 4 servings of fruit and vegetables a day, include whole grains & fiber and avoid regularly eating high fat or \"junk\" foods? No    Do you have any problems taking medications regularly? No    Do you have any side effects from medications? none    Needs assistance for the " "following daily activities: transportation, shopping, preparing meals and housework    Which of the following safety concerns are present in your home?  none identified     Hearing impairment: No    In the past 6 months, have you been bothered by leaking of urine? no    Mental Health:    In general, how would you rate your overall mental or emotional health? fair  PHQ-2 Score:      Do you feel safe in your environment? Yes    Have you ever done Advance Care Planning? (For example, a Health Directive, POLST, or a discussion with a medical provider or your loved ones about your wishes)? Yes, advance care planning is on file.    Fall risk:  Fallen 2 or more times in the past year?: No  Any fall with injury in the past year?: No        Do you have sleep apnea, excessive snoring or daytime drowsiness?: yes    Social History     Tobacco Use     Smoking status: Every Day     Packs/day: 1.00     Years: 1.00     Pack years: 1.00     Types: Vaping Device, Cigarettes     Passive exposure: Current     Smokeless tobacco: Current     Types: Chew     Tobacco comments:     Smokes E Cigs equal to 1 PPD   Vaping Use     Vaping status: Every Day     Substances: Nicotine, Flavoring     Devices: Refillable tank   Substance Use Topics     Alcohol use: Yes     Comment: socially--\"not very often\" \"once a month\"     {Rooming staff  Click this link to complete the Prescreen if response below is not for today's visit  Alcohol Use Prescreen >3 drinks/day or > 7 drinks/week.  If the prescreen question answer is YES, complete the full AUDIT  :350720}        1/22/2021     2:27 PM   Alcohol Use   Prescreen: >3 drinks/day or >7 drinks/week? No     Do you have a current opioid prescription? No  Do you use any other controlled substances or medications that are not prescribed by a provider? None    Current providers sharing in care for this patient include:   Patient Care Team:  Ciro Love MD as PCP - General (Family Medicine)  Didier Ibarra, " MD as MD (Urology)  Deana Adrian APRN CNP as Nurse Practitioner  Dorina Benz, [facility] as Pharmacist (Pharmacist)  Star Ojeda MD as Assigned Surgical Provider  Danna Amato DO as Assigned Cancer Care Provider  Ciro Love MD as Assigned PCP  Judy Yadav, RN as Personal Advocate & Liaison (PAL) (Family Medicine)  Dorina Benz [facility] as Assigned MTM Pharmacist  Chloe Davenport, RN as Specialty Care Coordinator (Hematology & Oncology)  Aydee Bar RD as Registered Dietitian (Dietitian, Registered)  Marion Ferrell [facility] as Pharmacist (Pharmacist)  Shanice Adler MD as MD (Cardiovascular Disease)  Shanice Adler MD as Assigned Heart and Vascular Provider    Patient has been advised of split billing requirements and indicates understanding: Yes    {additonal problems for provider to add (Optional):131131}      Review of Systems   {ROS COMP (Optional):106157}      Objective    /75 (BP Location: Right arm, Patient Position: Chair, Cuff Size: Adult Large)   Pulse 83   Temp 98  F (36.7  C) (Oral)   Resp 20   Ht 1.829 m (6')   Wt (!) 174.6 kg (385 lb)   SpO2 96%   BMI 52.22 kg/m    Body mass index is 52.22 kg/m .  Physical Exam   {Exam List (Optional):577529}    {Diagnostic Test Results (Optional):960950}    {AMBULATORY ATTESTATION (Optional):984814}            Answers for HPI/ROS submitted by the patient on 6/19/2023  If you checked off any problems, how difficult have these problems made it for you to do your work, take care of things at home, or get along with other people?: Not difficult at all  PHQ9 TOTAL SCORE: 2

## 2023-06-19 NOTE — PROGRESS NOTES
"Kimo Greenwood is a 42 year old male who is being evaluated via a billable video visit.      The patient has been notified of following:     \"This video visit will be conducted via a call between you and your physician/provider. We have found that certain health care needs can be provided without the need for an in-person physical exam.  This service lets us provide the care you need with a video conversation.  If a prescription is necessary we can send it directly to your pharmacy.  If lab work is needed we can place an order for that and you can then stop by our lab to have the test done at a later time.    Video visits are billed at different rates depending on your insurance coverage.  Please reach out to your insurance provider with any questions.    If during the course of the call the physician/provider feels a video visit is not appropriate, you will not be charged for this service.\"    Patient has given verbal consent for Video visit? Yes  How would you like to obtain your AVS? MyChart  If you are dropped from the video visit, the video invite should be resent to: Text to cell phone: [phone]  Will anyone else be joining your video visit?  DERRICK Garcia worker  {If patient encounters technical issues they should call [phone]    Video-Visit Details    Type of service:  Video Visit    Video Start Time: 2:09 pm  Video End Time: 2:29 pm    Originating Location (pt. Location): Home    Distant Location (provider location):  Offsite (providers home)     Platform used for Video Visit: 51wan    During this virtual visit the patient is located in [place], patient verifies this as the location during the entirety of this visit.     Nutrition Consultation Note    Reason For Visit: Nutrition Assessment    Kimo Greenwood is a 42 year old presenting today for return nutrition consult.   Pt is interested in laparoscopic sleeve gastrectomy.  Will proceed with MWM at this time per pt/his mother.     Patient is " "accompanied by his mother, Belen.      This is pt's 5th required nutrition visits prior to surgery. Did NOT review the surgical information at this time - focusing on weight loss first.    Pt referred by JERRY Marinelli on January 10, 2023.    Coordination note (if pursuing surgery in future)   Needs baseline labs - Done 1/9/23  Needs Psych eval  Needs PCP letter of support  40 lb weight loss from initial  Surgeon meet and greet with Dr. Madrigal  Needs letter of support from therapist  Needs letter of support from psychiatrist       CO-MORBIDITIES OF OBESITY INCLUDE:        1/9/2023     1:43 PM   --   I have the following health issues associated with obesity None of the above     PMH:     Per PA note:  \"Hip OA - needing hip replacement.      LOREN - uses CPAP every night. Followed by PCP      Barretts esophagus - has not had any GERD symptoms recently. Well controlled on PPI.      HTN - well controlled on medications      Degenerative disk disease - causes low back pain. 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 Followed by urology\"    Depression - has therapist/psych, hx of suicide attempt per questionnaire     Speech/Language delay    SUPPORT:      1/9/2023     1:43 PM   Support System Reviewed With Patient   Who do you have in your support network that can be available to help you for the first 2 weeks after surgery? agency   Who can you count on for support throughout your weight loss surgery journey? fully supportive       ANTHROPOMETRICS:  Consult weight 1/9/23: 407 lbs with BMI 55.28    Estimated body mass index is 52.24 kg/m  as calculated from the following:    Height as of this encounter: 1.829 m (6').    Weight as of this encounter: 174.7 kg (385 lb 3.2 oz).     Current weight: 385 lbs, pt report    Goal weight for hip replacement per Nya Camarena's note: 330 lbs         View : No data to display.                    1/9/2023     1:43 PM   Weight Loss Questions Reviewed With Patient   How " long have you been overweight? Since early childhood     MEDICATIONS FOR WEIGHT LOSS:  Wegovy prescribed 1/9/22 - On 1.7 mg dose now    Hx   Saxenda -  Had been denied at first but was able to get with appeal per pt  Topiramate (ineffective)    SUPPLEMENT INFORMATION:  Vitamin D3 5,000 international unit(s)/day  B complex    Labs 6/19/23  Vit D WNL  TSH  WNL    Hx of Vit D def    NUTRITION HISTORY:  Pt present today with his mom and legal guardian, Belen.     Patient is considering Bariatric surgery. Hoping to lose weight for hip replacement with goal of 330 lbs. Weight gain due to changes in diet and decrease in activity.     Wants to work on MWM first and consider surgical approach down the line as appropriate.     Worked with a RD a long time ago at [facility] for weight loss.     Mobility is very limited.   Moving into an apartment with a pool next month.     Per PA note (not yet finalized at time of writing this)    Eating 2 meals a day, with snacks. Trying to decrease pop. Meals are provided by group home. However, will go to the store to get candy, chip, cookies, crackers, ice cream, and pudding, and soda. Has increased hunger. Struggles with getting full. From exam, however Willard have increased hunger,   Breakfast - cereal with milk   Lunch - provided by group home  Dinner - pizza   Snacks - cookies, animal crackers   Pop - 1 liter of regular pop at a time.      Activity - Hip pain limits activity. Will try to get up and walk around as much as possible. Cannot walk more then 50 yards at a time.     Fluids: OJ, pop (rosmery jean baptiste, dr. Pepper, [facility]) , powerade, crystal light, water, smoothies  ? Currently drinking 1-2 24 oz bottles of pop/day per pt. Pt s mother thinks he may be drinking more than this  ? Open to considering diet pop instead of regular. Ideally want to switch to water/sparkling water long-term   ? Has a soda stream per pt s mom    Vegetables he likes: broccoli, celery, carrots, Brussel sprouts,  asparagus   Likes baked beans    Alochol: rare    Nicotine: vapes     Feb 2023:  Patient moved into a new place since last seen.  Patient present today with Crys, one of his helpers.     Per Crys/pt recent foods have included:    Breakfast - oatmeal or oatmeal or pb toast or egg sausage sandwich  L - varies, example: salad  D - varies, homemade pizza, homemade chicken drumsticks, grilled cheese with side of chips  Snacks: yoplait with peaches, banana bread, oranges/apples    Patient and Crys wanted to go over meal ideas for Willard. Did not review last month's goals in detail. Will carry over to this month     March 2023:    Pt reports making some dietary changes such as trying to do skinny cow ice cream instead of regular.     Struggling with cravings. Hard to stick to buying 1 pop as opposed to a case. Feels he is addicted.     Occ getting busy and not eating enough - today did not eat for first time until about 1 pm. Spent session today discussing food ideas for on the go and nutritious alternatives to foods he likes.    April 2023:  Using sugar-free flavor packs to help with hydration. Drinking 2-3 water bottles/day.   Also drinking some coffee in the mornings.  Other fluids include: OJ, low fat chocolate milk (about 1/2 gallon), powerade  Also working on buying 6 packs instead of 12 packs of pop.     Started having breakfast more often. Bought some cinnamon special K and made banana pancakes.    Lunch examples chicken salad sandwich, tuna sandwich, pb & j. Yesterday had chicken and some oranges.     Dinner examples mac n cheese, tuscon soup, kilbasa, venison steak with salad and fruit, fajitas with low carb wraps    June 2023:  Continues to progress with dietary goals.   Doing a light OJ now as opposed to regular.     Working on eating more fruits/vegetables.   Snacking on celery/pb, raisins, string cheese wrapped in ham, etc.    Meal examples: grilled chicken with bruschetta topping and red potatoes, chicken  fajitas, fruit smoothies    Goes grocery shopping 1x/week and utilizing food shelf 1x/week.    Previous goals:  1. Aim to increase to 3-4 water bottles/day - In progress, out of sugar-free flavorings right now.   2. Trial no chocolate milk next week - Met, hasn't had any since before our last appointment.  3. Plan to try chicken enchilada and salmon patties - Has not made salmon patties yet but did have chicken fajitas.     Additional information:  Disabled - lives at [facility] - moving Feb 6th to a crisis housing apartment, will have ILS staff     Single         1/9/2023     1:43 PM   Recall Diet Questions Reviewed With Patient   Describe what you typically consume for lunch (typical or most recent) candy soda(8)cans to 10cans a day   Describe what you typically consume for supper (typical or most recent) pizza   How many ounces of water, or other low calorie drinks, do you drink daily (8 oz=1 glass)? 0 oz   How many ounces of caffeine (coffee, tea, pop) do you drink daily (8 oz=1 glass)? 24 oz   How many ounces of milk do you drink daily (8 oz=1 glass) 0 oz   How often do you drink alcohol? Never           1/9/2023     1:43 PM   Eating Habits   Do you have any dietary restrictions? No   Do you currently binge eat (eat a large amount of food in a short time)? Yes   Are you an emotional eater? Yes   Do you get up to eat after falling asleep? Yes           1/9/2023     1:43 PM   Dining Out History Reviewed With Patient   How often do you dine out? Never.   Where do you dine out? (select all that apply) fast food chains   What types of food do you order when you dine out? hamburger       EXERCISE:        1/9/2023     1:43 PM   --   How often do you exercise? Never   What keeps you from being more active? Pain       NUTRITION DIAGNOSIS:  Obesity r/t long history of positive energy balance aeb BMI >30 kg/m2.    INTERVENTION:  Reviewed current dietary habits and pts history   Discussed  long-term goals pt hopes to accomplish in RD appointments  Answered pt questions  Coordination of care   Nutrition education - Plate method, fats, low kcal beverages, starchy vs non-starchy vegetables, sources of carbohydrates, lean protein vs fattier proteins  AVS and handouts via [email or web address]          1/9/2023     1:43 PM   Questions Reviewed With Patient   How ready are you to make changes regarding your weight? Number 1 = Not ready at all to make changes up to 10 = very ready. 1   How confident are you that you can change? 1 = Not confident that you will be successful making changes up to 10 = very confident. 1     Expected Engagement: fair-good (good engagement during appt however pt rated confidence as a 1 per questionnaire)     GOALS:  1. Aim to get more water flavorings    2. Aim to have 1 single serving of chocolate milk/week  3. Aim for 2 meals/day   4. Grocery list: yogurt, water flavor packs  5. Consider adding yogurt to smoothie to make it more of a meal and help increase protein intake    Protein shake examples:  ? Premier Protein (160 Calories, 30 g protein)  ? Boost/Ensure Max (160 calories, 30 gm protein)   ? Fairlife Core Power (170 calories, 26 gm protein)  ? Aldi's Elevation Protein Shake (160 calories, 30 gm protein)   ? Equate Protein Shake (160 calories, 30 gm protein)  ? Slim Fast Advanced Nutrition (180 Calories, 20 g protein)  ? Muscle Milk, lactose-free, 17 oz bottle (210 Calories, 30 g protein)    Ideas to bring on the go:   - Nutrigrain bars   - Almonds   - Tuna    - Snack packs   - Dried fruit (try to find no sugar added)   - Yogurt   - Yogurt covered nuts     Meal ideas or components discussed: honey mustard chicken, baked beans, asparagus, broccoli, chili with beans and beef, white chicken chili with onions/peppers, fish - walleye, salmon, etc., Barilla Protein + pasta, loaded baked potatoe, sweet potatoe breakfast skillet with eggs, tuna salad, tuna casserole     RESOURCES:     Plate  method can be used for general guidance on balanced meals/portion sizes (see link below for picture/more information)                 Make 1/2 your plate non starchy vegetables (cauliflower, broccoli, asparagus, [place] sprouts, lettuce, carrots, for example).                5+ oz lean protein sources (salmon/skinless chicken/turkey breast/pork loin/lean cuts of beef/ or non-animal protein such as black, kidney or kuo beans, tofu/edamame/tempeh)     (Note: 3 oz = deck of cards size)                 ~1 cup carbohydrate choices such as whole grain starches or starchy vegetables or fruit. For example: quinoa, brown rice, barley, potatoes, sweet potatoes, winter squash, peas, corn, or fruit.               Choose ~0-2 added fat servings at a meal (avocados, nut butters, nuts or seeds, olive oil, vegetable oils).    The Plate Method:  https://www.cdc.gov/diabetes/images/managing/Diabetes-Manage-Eat-Well-Plate-Graphic_600px.jpg    Building a Plate  [email or web address]    Protein Sources for Weight Loss  [email or web address]     Carbohydrates  [email or web address]     Mindful Eating  [email or web address]     Summary of Volumetrics Eating Plan  [email or web address]     Seated Exercises for Arms and Legs (can be done before or after surgery)  [email or web address]    Follow up:    Wednesday, August 23rd at 1:30 pm     Time spent with patient: 20 minutes.  DHEERAJ Israel, RD, LD

## 2023-06-20 ENCOUNTER — TELEPHONE (OUTPATIENT)
Dept: FAMILY MEDICINE | Facility: CLINIC | Age: 43
End: 2023-06-20

## 2023-06-20 ENCOUNTER — VIRTUAL VISIT (OUTPATIENT)
Dept: ENDOCRINOLOGY | Facility: CLINIC | Age: 43
End: 2023-06-20
Payer: COMMERCIAL

## 2023-06-20 VITALS — HEIGHT: 72 IN | WEIGHT: 315 LBS | BODY MASS INDEX: 42.66 KG/M2

## 2023-06-20 DIAGNOSIS — E66.01 CLASS 3 SEVERE OBESITY DUE TO EXCESS CALORIES WITH SERIOUS COMORBIDITY AND BODY MASS INDEX (BMI) OF 50.0 TO 59.9 IN ADULT (H): ICD-10-CM

## 2023-06-20 DIAGNOSIS — Z71.3 NUTRITIONAL COUNSELING: Primary | ICD-10-CM

## 2023-06-20 DIAGNOSIS — M21.6X1 ACQUIRED PRONATION DEFORMITY OF RIGHT ANKLE: Primary | ICD-10-CM

## 2023-06-20 DIAGNOSIS — E66.813 CLASS 3 SEVERE OBESITY DUE TO EXCESS CALORIES WITH SERIOUS COMORBIDITY AND BODY MASS INDEX (BMI) OF 50.0 TO 59.9 IN ADULT (H): ICD-10-CM

## 2023-06-20 LAB — TSH SERPL DL<=0.005 MIU/L-ACNC: 2.4 UIU/ML (ref 0.3–4.2)

## 2023-06-20 PROCEDURE — 99207 PR NO CHARGE LOS: CPT | Mod: VID | Performed by: DIETITIAN, REGISTERED

## 2023-06-20 PROCEDURE — 97803 MED NUTRITION INDIV SUBSEQ: CPT | Mod: VID | Performed by: DIETITIAN, REGISTERED

## 2023-06-20 ASSESSMENT — PAIN SCALES - GENERAL: PAINLEVEL: MODERATE PAIN (4)

## 2023-06-20 NOTE — PATIENT INSTRUCTIONS
GOALS:  1. Aim to get more water flavorings    2. Aim to have 1 single serving of chocolate milk/week  3. Aim for 2 meals/day   4. Grocery list: yogurt, water flavor packs  5. Consider adding yogurt to smoothie to make it more of a meal and help increase protein intake    Protein shake examples:  Premier Protein (160 Calories, 30 g protein)  Boost/Ensure Max (160 calories, 30 gm protein)   Fairlife Core Power (170 calories, 26 gm protein)  Aldi's Elevation Protein Shake (160 calories, 30 gm protein)   Equate Protein Shake (160 calories, 30 gm protein)  Slim Fast Advanced Nutrition (180 Calories, 20 g protein)  Muscle Milk, lactose-free, 17 oz bottle (210 Calories, 30 g protein)    Ideas to bring on the go:   - Nutrigrain bars   - Almonds   - Tuna    - Snack packs   - Dried fruit (try to find no sugar added)   - Yogurt   - Yogurt covered nuts     Meal ideas or components discussed: honey mustard chicken, baked beans, asparagus, broccoli, chili with beans and beef, white chicken chili with onions/peppers, fish - walleye, salmon, etc., Barilla Protein + pasta, loaded baked potatoe, sweet potatoe breakfast skillet with eggs, tuna salad, tuna casserole     RESOURCES:     Plate method can be used for general guidance on balanced meals/portion sizes (see link below for picture/more information)                 Make 1/2 your plate non starchy vegetables (cauliflower, broccoli, asparagus, [place] sprouts, lettuce, carrots, for example).                5+ oz lean protein sources (salmon/skinless chicken/turkey breast/pork loin/lean cuts of beef/ or non-animal protein such as black, kidney or kuo beans, tofu/edamame/tempeh)     (Note: 3 oz = deck of cards size)                 ~1 cup carbohydrate choices such as whole grain starches or starchy vegetables or fruit. For example: quinoa, brown rice, barley, potatoes, sweet potatoes, winter squash, peas, corn, or fruit.               Choose ~0-2 added fat servings at a meal  (avocados, nut butters, nuts or seeds, olive oil, vegetable oils).    The Plate Method:  https://www.cdc.gov/diabetes/images/managing/Diabetes-Manage-Eat-Well-Plate-Graphic_600px.jpg    Building a Plate  [email or web address]    Protein Sources for Weight Loss  [email or web address]     Carbohydrates  [email or web address]     Mindful Eating  [email or web address]     Summary of Volumetrics Eating Plan  [email or web address]     Seated Exercises for Arms and Legs (can be done before or after surgery)  [email or web address]    Follow up:    Wednesday, August 23rd at 1:30 pm     DHEERAJ Chinchilla, RD, LD  Clinic #: [phone]

## 2023-06-20 NOTE — TELEPHONE ENCOUNTER
FYI - Status Update    Who is Calling: [facility], states they assist pt with appts    Update: was going to do a referral for podiatry but pt's mother wants that to go to [facility] in [place]     Does caller want a call/response back: Yes     Could we send this information to you in [facility] or would you prefer to receive a phone call?:   Patient would prefer a phone call   Okay to leave a detailed message?: Yes at Cell number on file:    Telephone Information:   Mobile [phone]

## 2023-06-20 NOTE — NURSING NOTE
Is the patient currently in the state of [place]? YES    Visit mode:VIDEO    If the visit is dropped, the patient can be reconnected by: VIDEO VISIT: Text to cell phone: [phone]    Will anyone else be joining the visit? NO      How would you like to obtain your AVS? MyChart    Are changes needed to the allergy or medication list? NO    Reason for visit: RECHECK (Bariatric nutrition)        Fernanda Reed, VF

## 2023-06-20 NOTE — LETTER
"6/20/2023       RE: Kimo Greenwood  [address]  [address]  [facility] [address]     Dear Colleague,    Thank you for referring your patient, Kimo Greenwood, to the [facility] [place] [facility]. Please see a copy of my visit note below.    Kimo Greenwood is a 42 year old male who is being evaluated via a billable video visit.      The patient has been notified of following:     \"This video visit will be conducted via a call between you and your physician/provider. We have found that certain health care needs can be provided without the need for an in-person physical exam.  This service lets us provide the care you need with a video conversation.  If a prescription is necessary we can send it directly to your pharmacy.  If lab work is needed we can place an order for that and you can then stop by our lab to have the test done at a later time.    Video visits are billed at different rates depending on your insurance coverage.  Please reach out to your insurance provider with any questions.    If during the course of the call the physician/provider feels a video visit is not appropriate, you will not be charged for this service.\"    Patient has given verbal consent for Video visit? Yes  How would you like to obtain your AVS? MyChart  If you are dropped from the video visit, the video invite should be resent to: Text to cell phone: [phone]  Will anyone else be joining your video visit?  DERRICK Garcia worker  {If patient encounters technical issues they should call [phone]    Video-Visit Details    Type of service:  Video Visit    Video Start Time: 2:09 pm  Video End Time: 2:29 pm    Originating Location (pt. Location): Home    Distant Location (provider location):  Offsite (providers home)     Platform used for Video Visit: [employer]    During this virtual visit the patient is located in [place], patient verifies this as " "the location during the entirety of this visit.     Nutrition Consultation Note    Reason For Visit: Nutrition Assessment    Kimo Greenwood is a 42 year old presenting today for return nutrition consult.   Pt is interested in laparoscopic sleeve gastrectomy.  Will proceed with MWM at this time per pt/his mother.     Patient is accompanied by his mother, Belen.      This is pt's 5th required nutrition visits prior to surgery. Did NOT review the surgical information at this time - focusing on weight loss first.    Pt referred by JERRY Marinelli on January 10, 2023.    Coordination note (if pursuing surgery in future)   Needs baseline labs - Done 1/9/23  Needs Psych eval  Needs PCP letter of support  40 lb weight loss from initial  Surgeon meet and greet with Dr. Madrigal  Needs letter of support from therapist  Needs letter of support from psychiatrist       CO-MORBIDITIES OF OBESITY INCLUDE:        1/9/2023     1:43 PM   --   I have the following health issues associated with obesity None of the above     PMH:     Per PA note:  \"Hip OA - needing hip replacement.      LOREN - uses CPAP every night. Followed by PCP      Barretts esophagus - has not had any GERD symptoms recently. Well controlled on PPI.      HTN - well controlled on medications      Degenerative disk disease - causes low back pain. 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 Followed by urology\"    Depression - has therapist/psych, hx of suicide attempt per questionnaire     Speech/Language delay    SUPPORT:      1/9/2023     1:43 PM   Support System Reviewed With Patient   Who do you have in your support network that can be available to help you for the first 2 weeks after surgery? agency   Who can you count on for support throughout your weight loss surgery journey? fully supportive       ANTHROPOMETRICS:  Consult weight 1/9/23: 407 lbs with BMI 55.28    Estimated body mass index is 52.24 kg/m  as calculated from the following:    " Height as of this encounter: 1.829 m (6').    Weight as of this encounter: 174.7 kg (385 lb 3.2 oz).     Current weight: 385 lbs, pt report    Goal weight for hip replacement per Shasha's note: 330 lbs         View : No data to display.                    1/9/2023     1:43 PM   Weight Loss Questions Reviewed With Patient   How long have you been overweight? Since early childhood     MEDICATIONS FOR WEIGHT LOSS:  Wegovy prescribed 1/9/22 - On 1.7 mg dose now    Hx   Saxenda -  Had been denied at first but was able to get with appeal per pt  Topiramate (ineffective)    SUPPLEMENT INFORMATION:  Vitamin D3 5,000 international unit(s)/day  B complex    Labs 6/19/23  Vit D WNL  TSH  WNL    Hx of Vit D def    NUTRITION HISTORY:  Pt present today with his mom and legal guardian, Belen.     Patient is considering Bariatric surgery. Hoping to lose weight for hip replacement with goal of 330 lbs. Weight gain due to changes in diet and decrease in activity.     Wants to work on MWM first and consider surgical approach down the line as appropriate.     Worked with a RD a long time ago at [facility] for weight loss.     Mobility is very limited.   Moving into an apartment with a pool next month.     Per PA note (not yet finalized at time of writing this)    Eating 2 meals a day, with snacks. Trying to decrease pop. Meals are provided by group home. However, will go to the store to get candy, chip, cookies, crackers, ice cream, and pudding, and soda. Has increased hunger. Struggles with getting full. From exam, however Willard have increased hunger,   Breakfast - cereal with milk   Lunch - provided by group home  Dinner - pizza   Snacks - cookies, animal crackers   Pop - 1 liter of regular pop at a time.      Activity - Hip pain limits activity. Will try to get up and walk around as much as possible. Cannot walk more then 50 yards at a time.     Fluids: OJ, pop (rosmery jean baptiste, dr. Pepper, [facility]) , powerade, crystal light, water,  smoothies  Currently drinking 1-2 24 oz bottles of pop/day per pt. Pt s mother thinks he may be drinking more than this  Open to considering diet pop instead of regular. Ideally want to switch to water/sparkling water long-term   Has a soda stream per pt s mom    Vegetables he likes: broccoli, celery, carrots, Brussel sprouts, asparagus   Likes baked beans    Alochol: rare    Nicotine: vapes     Feb 2023:  Patient moved into a new place since last seen.  Patient present today with Crys, one of his helpers.     Per Crys/pt recent foods have included:    Breakfast - oatmeal or oatmeal or pb toast or egg sausage sandwich  L - varies, example: salad  D - varies, homemade pizza, homemade chicken drumsticks, grilled cheese with side of chips  Snacks: yoplait with peaches, banana bread, oranges/apples    Patient and Crys wanted to go over meal ideas for Willard. Did not review last month's goals in detail. Will carry over to this month     March 2023:    Pt reports making some dietary changes such as trying to do skinny cow ice cream instead of regular.     Struggling with cravings. Hard to stick to buying 1 pop as opposed to a case. Feels he is addicted.     Occ getting busy and not eating enough - today did not eat for first time until about 1 pm. Spent session today discussing food ideas for on the go and nutritious alternatives to foods he likes.    April 2023:  Using sugar-free flavor packs to help with hydration. Drinking 2-3 water bottles/day.   Also drinking some coffee in the mornings.  Other fluids include: OJ, low fat chocolate milk (about 1/2 gallon), powerade  Also working on buying 6 packs instead of 12 packs of pop.     Started having breakfast more often. Bought some cinnamon special K and made banana pancakes.    Lunch examples chicken salad sandwich, tuna sandwich, pb & j. Yesterday had chicken and some oranges.     Dinner examples mac n cheese, tuscon soup, kilbasa, venison steak with salad and fruit,  fajitas with low carb wraps    June 2023:  Continues to progress with dietary goals.   Doing a light OJ now as opposed to regular.     Working on eating more fruits/vegetables.   Snacking on celery/pb, raisins, string cheese wrapped in ham, etc.    Meal examples: grilled chicken with bruschetta topping and red potatoes, chicken fajitas, fruit smoothies    Goes grocery shopping 1x/week and utilizing food shelf 1x/week.    Previous goals:  1. Aim to increase to 3-4 water bottles/day - In progress, out of sugar-free flavorings right now.   2. Trial no chocolate milk next week - Met, hasn't had any since before our last appointment.  3. Plan to try chicken enchilada and salmon patties - Has not made salmon patties yet but did have chicken fajitas.     Additional information:  Disabled - lives at [facility] - moving Feb 6th to a crisis housing apartment, will have ILS staff     Single         1/9/2023     1:43 PM   Recall Diet Questions Reviewed With Patient   Describe what you typically consume for lunch (typical or most recent) candy soda(8)cans to 10cans a day   Describe what you typically consume for supper (typical or most recent) pizza   How many ounces of water, or other low calorie drinks, do you drink daily (8 oz=1 glass)? 0 oz   How many ounces of caffeine (coffee, tea, pop) do you drink daily (8 oz=1 glass)? 24 oz   How many ounces of milk do you drink daily (8 oz=1 glass) 0 oz   How often do you drink alcohol? Never           1/9/2023     1:43 PM   Eating Habits   Do you have any dietary restrictions? No   Do you currently binge eat (eat a large amount of food in a short time)? Yes   Are you an emotional eater? Yes   Do you get up to eat after falling asleep? Yes           1/9/2023     1:43 PM   Dining Out History Reviewed With Patient   How often do you dine out? Never.   Where do you dine out? (select all that apply) fast food chains   What types of food do you order when you dine  out? hamburger       EXERCISE:        1/9/2023     1:43 PM   --   How often do you exercise? Never   What keeps you from being more active? Pain       NUTRITION DIAGNOSIS:  Obesity r/t long history of positive energy balance aeb BMI >30 kg/m2.    INTERVENTION:  Reviewed current dietary habits and pts history   Discussed long-term goals pt hopes to accomplish in RD appointments  Answered pt questions  Coordination of care   Nutrition education - Plate method, fats, low kcal beverages, starchy vs non-starchy vegetables, sources of carbohydrates, lean protein vs fattier proteins  AVS and handouts via "[employer]          1/9/2023     1:43 PM   Questions Reviewed With Patient   How ready are you to make changes regarding your weight? Number 1 = Not ready at all to make changes up to 10 = very ready. 1   How confident are you that you can change? 1 = Not confident that you will be successful making changes up to 10 = very confident. 1     Expected Engagement: fair-good (good engagement during appt however pt rated confidence as a 1 per questionnaire)     GOALS:  1. Aim to get more water flavorings    2. Aim to have 1 single serving of chocolate milk/week  3. Aim for 2 meals/day   4. Grocery list: yogurt, water flavor packs  5. Consider adding yogurt to smoothie to make it more of a meal and help increase protein intake    Protein shake examples:  Premier Protein (160 Calories, 30 g protein)  Boost/Ensure Max (160 calories, 30 gm protein)   Fairlife Core Power (170 calories, 26 gm protein)  Aldi's Elevation Protein Shake (160 calories, 30 gm protein)   Equate Protein Shake (160 calories, 30 gm protein)  Slim Fast Advanced Nutrition (180 Calories, 20 g protein)  Muscle Milk, lactose-free, 17 oz bottle (210 Calories, 30 g protein)    Ideas to bring on the go:   - Nutrigrain bars   - Almonds   - Tuna    - Snack packs   - Dried fruit (try to find no sugar added)   - Yogurt   - Yogurt covered nuts     Meal ideas or components  discussed: honey mustard chicken, baked beans, asparagus, broccoli, chili with beans and beef, white chicken chili with onions/peppers, fish - walleye, salmon, etc., Barilla Protein + pasta, loaded baked potatoe, sweet potatoe breakfast skillet with eggs, tuna salad, tuna casserole     RESOURCES:     Plate method can be used for general guidance on balanced meals/portion sizes (see link below for picture/more information)                 Make 1/2 your plate non starchy vegetables (cauliflower, broccoli, asparagus, [place] sprouts, lettuce, carrots, for example).                5+ oz lean protein sources (salmon/skinless chicken/turkey breast/pork loin/lean cuts of beef/ or non-animal protein such as black, kidney or kuo beans, tofu/edamame/tempeh)     (Note: 3 oz = deck of cards size)                 ~1 cup carbohydrate choices such as whole grain starches or starchy vegetables or fruit. For example: quinoa, brown rice, barley, potatoes, sweet potatoes, winter squash, peas, corn, or fruit.               Choose ~0-2 added fat servings at a meal (avocados, nut butters, nuts or seeds, olive oil, vegetable oils).    The Plate Method:  https://www.cdc.gov/diabetes/images/managing/Diabetes-Manage-Eat-Well-Plate-Graphic_600px.jpg    Building a Plate  [email or web address]    Protein Sources for Weight Loss  [email or web address]     Carbohydrates  [email or web address]     Mindful Eating  [email or web address]     Summary of Volumetrics Eating Plan  [email or web address]     Seated Exercises for Arms and Legs (can be done before or after surgery)  [email or web address]    Follow up:    Wednesday, August 23rd at 1:30 pm     Time spent with patient: 20 minutes.  DHEERAJ Israel, RD, LD

## 2023-06-27 ENCOUNTER — LAB (OUTPATIENT)
Dept: INFUSION THERAPY | Facility: CLINIC | Age: 43
End: 2023-06-27
Attending: INTERNAL MEDICINE
Payer: COMMERCIAL

## 2023-06-27 ENCOUNTER — HOSPITAL ENCOUNTER (OUTPATIENT)
Dept: CT IMAGING | Facility: CLINIC | Age: 43
Discharge: HOME | End: 2023-06-27
Attending: INTERNAL MEDICINE | Admitting: INTERNAL MEDICINE
Payer: COMMERCIAL

## 2023-06-27 DIAGNOSIS — D64.9 ANEMIA, UNSPECIFIED TYPE: ICD-10-CM

## 2023-06-27 DIAGNOSIS — D72.828 NEUTROPHILIA: ICD-10-CM

## 2023-06-27 DIAGNOSIS — K76.9 LIVER LESION: ICD-10-CM

## 2023-06-27 LAB
ALBUMIN SERPL BCG-MCNC: 4.5 G/DL (ref 3.5–5.2)
ALP SERPL-CCNC: 78 U/L (ref 40–129)
ALT SERPL W P-5'-P-CCNC: 31 U/L (ref 0–70)
ANION GAP SERPL CALCULATED.3IONS-SCNC: 11 MMOL/L (ref 7–15)
AST SERPL W P-5'-P-CCNC: 17 U/L (ref 0–45)
BASOPHILS # BLD AUTO: 0.1 10E3/UL (ref 0–0.2)
BASOPHILS NFR BLD AUTO: 0 %
BILIRUB SERPL-MCNC: 0.6 MG/DL
BUN SERPL-MCNC: 23.9 MG/DL (ref 6–20)
CALCIUM SERPL-MCNC: 9.5 MG/DL (ref 8.6–10)
CHLORIDE SERPL-SCNC: 100 MMOL/L (ref 98–107)
CREAT SERPL-MCNC: 1.22 MG/DL (ref 0.67–1.17)
DEPRECATED HCO3 PLAS-SCNC: 24 MMOL/L (ref 22–29)
EOSINOPHIL # BLD AUTO: 0 10E3/UL (ref 0–0.7)
EOSINOPHIL NFR BLD AUTO: 0 %
ERYTHROCYTE [DISTWIDTH] IN BLOOD BY AUTOMATED COUNT: 13.1 % (ref 10–15)
FOLATE SERPL-MCNC: 11.8 NG/ML (ref 4.6–34.8)
GFR SERPL CREATININE-BSD FRML MDRD: 76 ML/MIN/1.73M2
GLUCOSE SERPL-MCNC: 109 MG/DL (ref 70–99)
HCT VFR BLD AUTO: 39.5 % (ref 40–53)
HGB BLD-MCNC: 14 G/DL (ref 13.3–17.7)
IMM GRANULOCYTES # BLD: 0.1 10E3/UL
IMM GRANULOCYTES NFR BLD: 1 %
IRON BINDING CAPACITY (ROCHE): 301 UG/DL (ref 240–430)
IRON SATN MFR SERPL: 27 % (ref 15–46)
IRON SERPL-MCNC: 81 UG/DL (ref 61–157)
LAB DIRECTOR COMMENTS: NORMAL
LAB DIRECTOR COMMENTS: NORMAL
LAB DIRECTOR DISCLAIMER: NORMAL
LAB DIRECTOR DISCLAIMER: NORMAL
LAB DIRECTOR INTERPRETATION: NORMAL
LAB DIRECTOR INTERPRETATION: NORMAL
LAB DIRECTOR METHODOLOGY: NORMAL
LAB DIRECTOR METHODOLOGY: NORMAL
LAB DIRECTOR RESULTS: NORMAL
LAB DIRECTOR RESULTS: NORMAL
LYMPHOCYTES # BLD AUTO: 2.1 10E3/UL (ref 0.8–5.3)
LYMPHOCYTES NFR BLD AUTO: 12 %
MCH RBC QN AUTO: 29.7 PG (ref 26.5–33)
MCHC RBC AUTO-ENTMCNC: 35.4 G/DL (ref 31.5–36.5)
MCV RBC AUTO: 84 FL (ref 78–100)
MONOCYTES # BLD AUTO: 0.9 10E3/UL (ref 0–1.3)
MONOCYTES NFR BLD AUTO: 5 %
NEUTROPHILS # BLD AUTO: 15 10E3/UL (ref 1.6–8.3)
NEUTROPHILS NFR BLD AUTO: 82 %
NRBC # BLD AUTO: 0 10E3/UL
NRBC BLD AUTO-RTO: 0 /100
PLATELET # BLD AUTO: 287 10E3/UL (ref 150–450)
POTASSIUM SERPL-SCNC: 3.6 MMOL/L (ref 3.4–5.3)
PROT SERPL-MCNC: 7.6 G/DL (ref 6.4–8.3)
RBC # BLD AUTO: 4.71 10E6/UL (ref 4.4–5.9)
SODIUM SERPL-SCNC: 135 MMOL/L (ref 136–145)
SPECIMEN DESCRIPTION: NORMAL
SPECIMEN DESCRIPTION: NORMAL
TSH SERPL DL<=0.005 MIU/L-ACNC: 2.84 UIU/ML (ref 0.3–4.2)
WBC # BLD AUTO: 18.2 10E3/UL (ref 4–11)

## 2023-06-27 PROCEDURE — 81206 BCR/ABL1 GENE MAJOR BP: CPT | Performed by: INTERNAL MEDICINE

## 2023-06-27 PROCEDURE — 250N000011 HC RX IP 250 OP 636: Performed by: INTERNAL MEDICINE

## 2023-06-27 PROCEDURE — 82607 VITAMIN B-12: CPT | Performed by: INTERNAL MEDICINE

## 2023-06-27 PROCEDURE — 84443 ASSAY THYROID STIM HORMONE: CPT | Performed by: INTERNAL MEDICINE

## 2023-06-27 PROCEDURE — 82746 ASSAY OF FOLIC ACID SERUM: CPT | Performed by: INTERNAL MEDICINE

## 2023-06-27 PROCEDURE — 83550 IRON BINDING TEST: CPT | Performed by: INTERNAL MEDICINE

## 2023-06-27 PROCEDURE — 250N000009 HC RX 250: Performed by: INTERNAL MEDICINE

## 2023-06-27 PROCEDURE — 82728 ASSAY OF FERRITIN: CPT | Performed by: INTERNAL MEDICINE

## 2023-06-27 PROCEDURE — 81207 BCR/ABL1 GENE MINOR BP: CPT | Performed by: INTERNAL MEDICINE

## 2023-06-27 PROCEDURE — 74178 CT ABD&PLV WO CNTR FLWD CNTR: CPT

## 2023-06-27 PROCEDURE — G0452 MOLECULAR PATHOLOGY INTERPR: HCPCS | Mod: 26 | Performed by: PATHOLOGY

## 2023-06-27 PROCEDURE — 36415 COLL VENOUS BLD VENIPUNCTURE: CPT

## 2023-06-27 PROCEDURE — 80053 COMPREHEN METABOLIC PANEL: CPT | Performed by: INTERNAL MEDICINE

## 2023-06-27 PROCEDURE — 85025 COMPLETE CBC W/AUTO DIFF WBC: CPT | Performed by: INTERNAL MEDICINE

## 2023-06-27 RX ORDER — IOPAMIDOL 755 MG/ML
500 INJECTION, SOLUTION INTRAVASCULAR ONCE
Status: COMPLETED | OUTPATIENT
Start: 2023-06-27 | End: 2023-06-27

## 2023-06-27 RX ADMIN — IOPAMIDOL 100 ML: 755 INJECTION, SOLUTION INTRAVENOUS at 14:49

## 2023-06-27 RX ADMIN — SODIUM CHLORIDE 65 ML: 9 INJECTION, SOLUTION INTRAVENOUS at 14:49

## 2023-06-27 NOTE — PROGRESS NOTES
Nursing Note:  Kimo Greenwood presents today for Labs + PIV insertion for CT scan.    Patient seen by provider today: No   present during visit today: Not Applicable.    Note: N/A.    Intravenous Access:  Labs drawn without difficulty.  Peripheral IV placed.    Discharge Plan:   Patient was sent to imaging for CT appointment.  Imaging department to discontinue PIV after scan.      Rohith Moore RN

## 2023-06-28 LAB
FERRITIN SERPL-MCNC: 203 NG/ML (ref 31–409)
VIT B12 SERPL-MCNC: 407 PG/ML (ref 232–1245)

## 2023-06-30 ENCOUNTER — ONCOLOGY VISIT (OUTPATIENT)
Dept: ONCOLOGY | Facility: CLINIC | Age: 43
End: 2023-06-30
Attending: INTERNAL MEDICINE
Payer: COMMERCIAL

## 2023-06-30 VITALS
OXYGEN SATURATION: 97 % | HEART RATE: 87 BPM | SYSTOLIC BLOOD PRESSURE: 131 MMHG | TEMPERATURE: 98.3 F | DIASTOLIC BLOOD PRESSURE: 83 MMHG | RESPIRATION RATE: 16 BRPM

## 2023-06-30 DIAGNOSIS — D64.9 ANEMIA, UNSPECIFIED TYPE: ICD-10-CM

## 2023-06-30 DIAGNOSIS — D72.828 NEUTROPHILIA: Primary | ICD-10-CM

## 2023-06-30 DIAGNOSIS — K76.9 LIVER LESION: ICD-10-CM

## 2023-06-30 PROCEDURE — 99214 OFFICE O/P EST MOD 30 MIN: CPT | Performed by: INTERNAL MEDICINE

## 2023-06-30 PROCEDURE — G0463 HOSPITAL OUTPT CLINIC VISIT: HCPCS | Performed by: INTERNAL MEDICINE

## 2023-06-30 ASSESSMENT — PAIN SCALES - GENERAL: PAINLEVEL: NO PAIN (0)

## 2023-06-30 NOTE — PROGRESS NOTES
Oncology Rooming Note    June 30, 2023 2:07 PM   Kimo Greenwood is a 42 year old male who presents for:    Chief Complaint   Patient presents with     Oncology Clinic Visit     Initial Vitals: Resp 16  Estimated body mass index is 52.24 kg/m  as calculated from the following:    Height as of 6/20/23: 1.829 m (6').    Weight as of 6/20/23: 174.7 kg (385 lb 3.2 oz). There is no height or weight on file to calculate BSA.  No Pain (0) Comment: Data Unavailable   No LMP for male patient.  Allergies reviewed: Yes  Medications reviewed: Yes    Medications: Medication refills not needed today.  Pharmacy name entered into [employer]:    [id number] - [place], [place] - [address] [facility] [place] [address] [place] [address] [facility] [place] [address]  [id number] - [place], [place] - [address] AT [facility]    Clinical concerns: follow up    Sharlene Benitesgerson            [facility]    Hematology/Oncology Established Patient Note      Today's Date: 6/30/23    Reason for Consultation: Elevated WBC  Referring Provider: Kory Hudson MD      HISTORY OF PRESENT ILLNESS: Kimo Greenwood is a 42 year old male who is referred for elevated WBC, anemia, splenomegaly. 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    Patient has evidence of chronic, intermittent leukocytosis since 2012. Specifically, he has had neutrophilia. On 4/22/22, WBC was 11.1, ANC 9.7. On 4/27/22, WBC was 8.0, ANC 4.8. Patient also has evidence of chronic, intermittent NCNC anemia. Record review shows gillian of 9.1 in 2017. Patient and mother unable to recall the events of that  timeframe.     Of note, patient was hospitalized for pneumonia earlier this year. Per record review, an incidental mass was noted an 8.7 cm lipomatous mass in the retroperitoneum near the kidney and spleen. He was evaluated by surgical oncology, Dr. Ojeda, with biopsy on 3/8/22 returning as lipomatous tumor/liposarcoma, negative for MDM2. He is due for follow up imaging in September 2022 with follow in surg onc clinic.    A repeat CT chest was done on 3/23/22, which returned with significant improvement RLL pneumonia. It continued to show the 8.7 x 7.9 x 9.3 cm RP mass in the LUQ. The spleen was enlarged to 15.5 cm. No other adenopathy.     Patient is present with mother. They report only new medication is liraglutide for weight management. No weight loss, fever, drenching night sweats, or LAD.     Of note, patient lives at [facility]. He is able to perform ADLs. He does smoke cigarettes.       INTERIM HISTORY:  Most recent scans showed stable RP mass, resolution of the hepatic lesion at segment 8 and stable segment 6/7 lesion.  He states one time episode of BRBPR (last colonoscopy 2020, this was performed due to colitis in mother, told 5 year follow up). He has chronic left hip pain s/p MARGO.     He is present today with a caregiver. No acute events. He states he is due for orthopedic surgery perhaps sometime in December. No weight loss, fever, LAD.      REVIEW OF SYSTEMS:   A 14 point ROS was reviewed with pertinent positives and negatives in the HPI.        HOME MEDICATIONS:  Current Outpatient Medications   Medication Sig Dispense Refill     albuterol (VENTOLIN HFA) 108 (90 Base) MCG/ACT inhaler INHALE 2 PUFFS INTO LUNGS EVERY SIX HOURS AS NEEDED FOR SHORTNESS OF BREATH / DYSPNEA OR WHEEZING 18 g 1     calcium polycarbophil (FIBER-LAX) 625 MG tablet Take 2 tablets (1,250 mg) by mouth 2 times daily 360 tablet 0     DULoxetine (CYMBALTA) 60 MG capsule Take 120 mg by mouth daily        hydrochlorothiazide  (HYDRODIURIL) 25 MG tablet Take 1 tablet (25 mg) by mouth daily 30 tablet 7     Lidocaine (LIDOCARE) 4 % Patch Place 1-2 patches onto the skin every 24 hours To prevent lidocaine toxicity, patient should be patch free for 12 hrs daily. (Patient taking differently: Place onto the skin every 24 hours To prevent lidocaine toxicity, patient should be patch free for 12 hrs daily.) 60 patch 1     lisinopril (ZESTRIL) 10 MG tablet Take 1 tablet (10 mg) by mouth every morning 30 tablet 7     mirabegron (MYRBETRIQ) 50 MG 24 hr tablet Take 1 tablet by mouth daily       OLANZapine-samidorphan (LYBALVI) 10-10 MG TABS tablet Take 1 tablet by mouth At Bedtime       order for DME Equipment being ordered: CPAP supplies 1 each 0     oxybutynin ER (DITROPAN XL) 15 MG 24 hr tablet Take 30 mg by mouth daily       pantoprazole (PROTONIX) 40 MG EC tablet TAKE 1 TABLET BY MOUTH ONCE DAILY 30-60 MINUTES BR FIRST MEAL OF THE DAY 30 tablet 7     prazosin (MINIPRESS) 1 MG capsule Take 3 mg by mouth At Bedtime       Semaglutide-Weight Management (WEGOVY) 1.7 MG/0.75ML pen Inject 1.7 mg Subcutaneous once a week 9 mL 1     vitamin B complex with vitamin C (VITAMIN  B COMPLEX) tablet Take 1 tablet by mouth daily       VITAMIN D3 50 MCG (2000 UT) tablet TAKE ONE TABLET BY MOUTH EVERY DAY 90 tablet 3         ALLERGIES:  Allergies   Allergen Reactions     Other [No Clinical Screening - See Comments] Other (See Comments)     Awoke from anesthesia with anger and ripping off dressing, after interstim placement, outside hospital 2013         PAST MEDICAL HISTORY:  Past Medical History:   Diagnosis Date     Arthritis     DDD hips and knees     Asthma      Asthma      Patel's esophagus      Chronic pain      Chronic pain - left hip/leg pain     degenerative disc disease, back, hips, knees     Gastroesophageal reflux disease      History of anesthesia complications     agitation x1 after interstim 2013     Hypertension      Hypertension      Leukocytosis       LPRD (laryngopharyngeal reflux disease)      Marijuana abuse      Marijuana abuse      MDD (major depressive disorder)      MDD (major depressive disorder)      Mental retardation, mild (I.Q. 50-70)      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    With associated speech/language delay     Obesity 11/11/2010     LOREN (obstructive sleep apnea)     Uses a CPAP     LOREN (obstructive sleep apnea)      Seborrheic dermatitis      Seborrheic dermatitis      Sleep apnea     CPAP         PAST SURGICAL HISTORY:  Past Surgical History:   Procedure Laterality Date     ARTHROPLASTY HIP Left 1/25/2017    Procedure: ARTHROPLASTY HIP;  Surgeon: Bala Randall MD;  Location: [facility] OR     ARTHROPLASTY HIP Left      ARTHROPLASTY REVISION HIP Left 6/24/2019    Procedure: Revision left total hip arthroplasty with a head and liner exchange to a Biomet dual mobility head and liner.;  Surgeon: Bala Randall MD;  Location: [facility] OR     BLADDER SURGERY      Ibarra, [facility],Interstem stimulator implant     CLOSED REDUCTION HIP Left 6/10/2019    Procedure: Closed reduction under general anesthesia left recurrent prosthetic hip dislocation;  Surgeon: Rafat Cazares MD;  Location: [facility] OR     COLONOSCOPY N/A 10/30/2015    Procedure: COMBINED COLONOSCOPY, SINGLE OR MULTIPLE BIOPSY/POLYPECTOMY BY BIOPSY;  Surgeon: Alexis Jackson MD;  Location:  GI     COLONOSCOPY N/A 11/17/2016    Procedure: COMBINED COLONOSCOPY, SINGLE OR MULTIPLE BIOPSY/POLYPECTOMY BY BIOPSY;  Surgeon: Cat Huber MD;  Location:  GI     COLONOSCOPY N/A 10/28/2020    Procedure: COLONOSCOPY;  Surgeon: You Kraus MD;  Location: [facility] OR     COLONOSCOPY       ESOPHAGOSCOPY, GASTROSCOPY, DUODENOSCOPY (EGD), COMBINED N/A 11/17/2016    Procedure: COMBINED ESOPHAGOSCOPY, GASTROSCOPY, DUODENOSCOPY (EGD), BIOPSY SINGLE OR MULTIPLE;  Surgeon: Cat Huber MD;  Location:  GI     ESOPHAGOSCOPY, GASTROSCOPY,  "DUODENOSCOPY (EGD), COMBINED N/A 3/12/2020    Procedure: ESOPHAGOGASTRODUODENOSCOPY WITH BIOPSIES;  Surgeon: You Kraus MD;  Location: [facility] OR     ESOPHAGOSCOPY, GASTROSCOPY, DUODENOSCOPY (EGD), COMBINED N/A 10/28/2020    Procedure: ESOPHAGOGASTRODUODENOSCOPY, WITH BIOPSY;  Surgeon: You Kraus MD;  Location: [facility] OR     ESOPHAGOSCOPY, GASTROSCOPY, DUODENOSCOPY (EGD), COMBINED       EXAM UNDER ANESTHESIA, FULGURATE CONDYLOMA ANUS, COMBINED N/A 12/22/2016    Procedure: COMBINED EXAM UNDER ANESTHESIA, FULGURATE CONDYLOMA ANUS;  Surgeon: Nya Cabello MD;  Location: [facility] OR     GENITOURINARY SURGERY       JOINT REPLACEMENT Left 2017    MARGO, Revision Left MARGO     OTHER SURGICAL HISTORY      interstim stimulator implant     N[place] CYSTOURETHROSCOPY INJ CHEMODENERVATION BLADDER N/A 1/16/2019    Procedure: CYSTOSCOPY BOTOX BLADDER INJECTIONS (100 UNITS IN 10CC);  Surgeon: Didier Ibarra MD;  Location: [facility];  Service: Urology     N[place] IMPLANT PERIPH/GASTRIC NEUROSTIM/ N/A 1/8/2020    Procedure: NEW INTERSTIM LEAD, REPLACE OLD; NEW INTERSTIM BATTERY, REPLACE OLD;  Surgeon: Didier Ibarra MD;  Location: [facility];  Service: Urology         SOCIAL HISTORY:  Social History     Socioeconomic History     Marital status: Single     Spouse name: Not on file     Number of children: Not on file     Years of education: Not on file     Highest education level: Not on file   Occupational History     Not on file   Tobacco Use     Smoking status: Every Day     Packs/day: 1.00     Years: 1.00     Pack years: 1.00     Types: Vaping Device, Cigarettes     Passive exposure: Current     Smokeless tobacco: Current     Types: Chew     Tobacco comments:     Smokes E Cigs equal to 1 PPD   Vaping Use     Vaping Use: Every day     Substances: Nicotine, Flavoring     Devices: Refillable tank   Substance and Sexual Activity     Alcohol use: Yes     Comment: socially--\"not very often\" \"once a month\"     Drug " use: No     Comment: patient denies any marijuana use     Sexual activity: Yes     Partners: Female     Birth control/protection: Condom   Other Topics Concern     Parent/sibling w/ CABG, MI or angioplasty before 65F 55M? No   Social History Narrative     Not on file     Social Determinants of Health     Financial Resource Strain: Not on file   Food Insecurity: Not on file   Transportation Needs: Not on file   Physical Activity: Not on file   Stress: Not on file   Social Connections: Not on file   Intimate Partner Violence: Not At Risk (12/30/2022)    Humiliation, Afraid, Rape, and Kick questionnaire      Fear of Current or Ex-Partner: No      Emotionally Abused: No      Physically Abused: No      Sexually Abused: No   Housing Stability: Not on file         FAMILY HISTORY:  Family History   Problem Relation Age of Onset     Anxiety Disorder Mother      Depression Mother      Ulcerative Colitis Mother 18     Breast Cancer Maternal Grandmother      Cerebrovascular Disease Maternal Grandmother      Breast Cancer Maternal Aunt      Cancer Maternal Grandfather         grt uncles colon cancer         PHYSICAL EXAM:  Vital signs:  /83   Pulse 87   Temp 98.3  F (36.8  C) (Oral)   Resp 16   SpO2 97%      GENERAL/CONSTITUTIONAL: No acute distress.  RESPIRATORY: Clear to auscultation bilaterally. No crackles or wheezing.   CARDIOVASCULAR: Regular rate and rhythm without murmurs, gallops, or rubs.  NEUROLOGIC: Cranial nerves II-XII are intact. Alert, oriented, answers questions appropriately.  INTEGUMENTARY: No rashes or jaundice.  GAIT: In wheelchair.      LABS:   Latest Reference Range & Units 06/27/23 14:20   Sodium 136 - 145 mmol/L 135 (L)   Potassium 3.4 - 5.3 mmol/L 3.6   Chloride 98 - 107 mmol/L 100   Carbon Dioxide (CO2) 22 - 29 mmol/L 24   Urea Nitrogen 6.0 - 20.0 mg/dL 23.9 (H)   Creatinine 0.67 - 1.17 mg/dL 1.22 (H)   GFR Estimate >60 mL/min/1.73m2 76   Calcium 8.6 - 10.0 mg/dL 9.5   Anion Gap 7 - 15 mmol/L  11   Albumin 3.5 - 5.2 g/dL 4.5   Protein Total 6.4 - 8.3 g/dL 7.6   Alkaline Phosphatase 40 - 129 U/L 78   ALT 0 - 70 U/L 31   AST 0 - 45 U/L 17   Bilirubin Total <=1.2 mg/dL 0.6   Ferritin 31 - 409 ng/mL 203   Folate 4.6 - 34.8 ng/mL 11.8   Glucose 70 - 99 mg/dL 109 (H)   Iron 61 - 157 ug/dL 81   Iron Binding Capacity 240 - 430 ug/dL 301   Iron Sat Index 15 - 46 % 27   TSH 0.30 - 4.20 uIU/mL 2.84   Vitamin B12 232 - 1,245 pg/mL 407   WBC 4.0 - 11.0 10e3/uL 18.2 (H)   Hemoglobin 13.3 - 17.7 g/dL 14.0   Hematocrit 40.0 - 53.0 % 39.5 (L)   Platelet Count 150 - 450 10e3/uL 287   RBC Count 4.40 - 5.90 10e6/uL 4.71   MCV 78 - 100 fL 84   MCH 26.5 - 33.0 pg 29.7   MCHC 31.5 - 36.5 g/dL 35.4   RDW 10.0 - 15.0 % 13.1   % Neutrophils % 82   % Lymphocytes % 12   % Monocytes % 5   % Eosinophils % 0   % Basophils % 0   Absolute Basophils 0.0 - 0.2 10e3/uL 0.1   Absolute Eosinophils 0.0 - 0.7 10e3/uL 0.0   Absolute Immature Granulocytes <=0.4 10e3/uL 0.1   Absolute Lymphocytes 0.8 - 5.3 10e3/uL 2.1   Absolute Monocytes 0.0 - 1.3 10e3/uL 0.9   % Immature Granulocytes % 1   Absolute Neutrophils 1.6 - 8.3 10e3/uL 15.0 (H)   Absolute NRBCs 10e3/uL 0.0   NRBCs per 100 WBC <1 /100 0     Interpretation    No mutations were identified in the analyzed gene regions of JAK2, CALR, or MPL genes.     RESULTS    BCR::ABL1 MINOR(p190) QUANTITATION RESULTS:  BCR::ABL1/ABL1 minor(p190):  UNDETECTABLE     INTERNAL CONTROL (NUMBER OF ABL1 TRANSCRIPTS): 8,380   INTERPRETATION    Molecular testing performed on submitted Blood.     BCR::ABL1 transcripts of the minor(p190) breakpoint cluster regions are undetectable in this sample.     BCR-ABL p210 pending        PATHOLOGY:  Surgical Pathology Report                         Case: [id number]                                   Authorizing Provider:  Star Ojeda MD         Collected:           03/08/2022 11:21 AM           Ordering Location:     [facility]   Received:             03/08/2022 01:49 PM                                  Imaging                                                                       Pathologist:           Heather Sidhu MD                                                            Specimen:    Retroperitoneal, Retroperitoneal Biopsy                                                    Final Diagnosis   Retroperitoneal mass, CT-guided needle biopsy:  -Mature adipose tissue, consistent with lipomatous tumor.  Limited sample.  See COMMENT.   Electronically signed by Heather Sidhu MD on 3/14/2022 at  2:36 PM   Comment    The morphologic findings are concerning for liposarcoma.  The biopsy is limited in nature, comprising of needle biopsy cores of lipomatous tissue.  The biopsy tissue may not be representative of the radiographically identified large (reported 8.7 cm) mass.  Fluorescence in-situ hybridization (FISH) for MDM2 probe is pending at this time (please see separate forthcoming [facility] cytogenetics laboratory report for results).       INTERPRETATION:  None (0%) of the interphase cells examined had a signal pattern indicative of amplification of MDM2.      Final Diagnosis 7/21/22:   Peripheral blood for morphology:  -Mild normochromic, normocytic anemia without evidence of red cell regeneration  -Leukocytosis with mature neutrophilia; leukocytes without morphologic abnormalities     Final Diagnosis 8/25/22:   Peripheral blood for morphology:  -Mild normochromic, normocytic anemia without evidence of red cell regeneration  -Leukocytosis with mature neutrophilia; leukocytes without morphologic abnormalities       Case Report   Flow Cytometry Report                             Case: [id number]                                   Authorizing Provider:  Danna Amato DO         Collected:           08/25/2022 08:36 AM           Ordering Location:     [facility]   Received:            08/25/2022 09:33 AM                                   [facility]                                                             Pathologist:           Denise Lee MD                                                     Specimen:    Peripheral Blood                                                                           Flow Interpretation   A. Peripheral Blood:  -Polytypic B cells  -No aberrant immunophenotype on T cells  -Rare to absent CD34-positive blasts  -See comment   Electronically signed by Denise Lee MD on 8/26/2022 at 12:10 PM   Comment    There is no immunophenotypic evidence of non-Hodgkin lymphoma, lymphoid leukemia, or high grade myeloid neoplasm.         IMAGING:  CT CHEST WITHOUT CONTRAST March 23, 2022  FINDINGS:   Chest/mediastinum: No cardiomegaly. Small pericardial effusion. No  significant mediastinal, hilar or axillary lymphadenopathy. Asymmetric  elevation of the left hemidiaphragm as compared to the right.     Lung/pleura: Trace left pleural effusion. No significant right pleural  effusion. No significant pneumothorax. Significant interval  improvement in the previously seen right lower lobe consolidative  pulmonary opacities as compared to 2/19/2022 outside CT. Only small  patchy foci of opacity in the medial aspect of the right lower lobe is  seen.      Upper abdomen: Limited evaluation of the upper abdomen due to lack of  coverage and contrast. Cholelithiasis without CT evidence of acute  cholecystitis. There is approximately 8.7 x 7.9 x 9.3 cm (axial and CC  dimensions, respectively), fatty area with minimal fat stranding in  the left upper quadrant retroperitoneal space just above the superior  pole of the kidney and abutting the adjacent adrenal gland, kidney,  pancreas and spleen. The spleen is enlarged measuring 15.5 cm in  craniocaudal dimension.     Bones and soft tissue: Multilevel degenerative changes of the spine.  Diffuse heterogenous appearance of the spine of indeterminate  etiology.                                                                       IMPRESSION:    1. Significant interval improvement in the previously seen right lower  lobe consolidative pulmonary opacity as compared to 2/19/2022 outside  CT. Only small patchy opacity in the medial aspect of the right lower  lobe remaining.  2. Small pericardial effusion.  3. Trace left pleural effusion.  4. There is approximately 8.7 x 7.9 x 9.3 cm fat attenuating area with  minimal fat stranding in the left upper quadrant, consistent with the  biopsy-proven lipomatous tumor.  5. Cholelithiasis without CT evidence of acute cholecystitis.  6. Splenomegaly.  7. Multilevel degenerative changes of the spine with diffuse  heterogenous appearance of the thoracic spine of indeterminate  Etiology.    CT AP 9/20/22:  IMPRESSION:   1.  There are two fat-containing left retroperitoneal masses that  appears stable in size. One of these at the left adrenal fossa  contacting the left adrenal and left kidney measures up to 10.5 cm  with ill-defined internal increased density. Another at the left  paraspinous retroperitoneum associated with atrophy of the left psoas  is also stable. The second lesion has hazy internal increased density  as well. This could just be stable complex lipomas. More aggressive  forms of retroperitoneal fat containing mass such as liposarcoma  cannot be entirely excluded. Therefore, recommend a surveillance CT to  establish long-term stability. If there are older studies that  includes this lesion for imaging, these would be useful for review.  Recommend surveillance CT in six to 12 months.  2.  There are two hypodense but indeterminate nodules within the  liver. One of these appears stable, but another is new at the right  hepatic dome compared to 2/19/2022. Therefore, recommend liver MRI for  further imaging workup.  3.  Cholelithiasis.    CT Liver 11/17/22:  IMPRESSION:   1.  The previously described new hepatic nodular lesion at segment 8  is not  currently seen. The previously described hypodense nodular mass  at the posterior right liver along segment 6/7 is stable in size, but  remains indeterminant with a neoplastic etiology being possible.  Recommend follow-up imaging to establish long-term stability.  2.  Retroperitoneal fat density lesions appears stable in size. One of  these abuts the left adrenal and the second is at the left psoas  region with psoas atrophy. Recommend further attention to these at  imaging follow-up.  3.  Cholelithiasis.      ASSESSMENT/PLAN:  Kimo Greenwood is a 42 year old male who is referred for elevated WBC, anemia, splenomegaly. 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    1) Chronic anemia and intermittent neutrophilia  -Patient has evidence of chronic, intermittent neutrophilia and NC/NC anemia.  -Asymptomatic.  -Repeat CBC in July 2022 with persistent neutrophilia and NC/NC anemia. Negative for dysplasia or evidence of blasts.   -Iron studies, B12, folate, TSH WNL in July 2022.  -FLOW cytometry normal 7/2022.  -CT CAP is showing normal spleen size.   -Over the last year, WBC has ranged 10-18 with ANC  7-16. Currently, WBC is 18.2, ANC 15. Normal Hb and platelets.  -JAK2/MPL/CALR negative.   -BCR-ABL p190 negative; p210 pending.  -Hold on bone marrow biopsy at this time.  -Recheck in one year.    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  -9/2022 scans show stability.  -11/2022 scans show stability.  -6/2023 scans show stability.  -Followed by Dr. Ojeda.    3) Liver lesions  -CT 11/17/22 did not visualize the previous lesion at segment 9; he still has a stable nodular mass of the posterior right liver 6/7.  -Repeat imaging 6/2023 unable to visualize.    4) History of MDD    5) History of  HTN, LOREN    6)  BRBPR, one time episode  -Patient with history of EGD and colonoscopy in 2020 and told 5 year follow up. Patient has history of hemorrhoids. Discussed if this is continued, he will need repeat endoscopy.     7) Hyperglycemia  -Follow up with PCP.     8) -Repeat CBC and CT Liver in 1 year prior to follow up with [facility].      Danna Amato DO  Hematology/Oncology  [facility]

## 2023-06-30 NOTE — LETTER
6/30/2023         RE: Kimo Greenwood  [address]  [address]  [facility] [id number]        Dear Colleague,    Thank you for referring your patient, Kimo Greenwood, to the [facility]. Please see a copy of my visit note below.    Oncology Rooming Note    June 30, 2023 2:07 PM   Kimo Greenwood is a 42 year old male who presents for:    Chief Complaint   Patient presents with     Oncology Clinic Visit     Initial Vitals: Resp 16  Estimated body mass index is 52.24 kg/m  as calculated from the following:    Height as of 6/20/23: 1.829 m (6').    Weight as of 6/20/23: 174.7 kg (385 lb 3.2 oz). There is no height or weight on file to calculate BSA.  No Pain (0) Comment: Data Unavailable   No LMP for male patient.  Allergies reviewed: Yes  Medications reviewed: Yes    Medications: Medication refills not needed today.  Pharmacy name entered into 1Mind:    [facility] [place], [place] - [address] [facility] [place] - [address] [facility], [place], [place] - [id number] [place] [address] PHARMACY [facility] [place], [place] - [address]  [id number]  [place], [place] - [address] AT [facility]    Clinical concerns: follow up    Sharlene Bernal            [facility]    Hematology/Oncology Established Patient Note      Today's Date: 6/30/23    Reason for Consultation: Elevated WBC  Referring Provider: Kory Hudson MD      HISTORY OF PRESENT ILLNESS: Kimo Greenwood is a 42 year old male who is referred for elevated WBC, anemia, splenomegaly. 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    Patient has evidence of chronic, intermittent leukocytosis since 2012.  Specifically, he has had neutrophilia. On 4/22/22, WBC was 11.1, ANC 9.7. On 4/27/22, WBC was 8.0, ANC 4.8. Patient also has evidence of chronic, intermittent NCNC anemia. Record review shows gillian of 9.1 in 2017. Patient and mother unable to recall the events of that timeframe.     Of note, patient was hospitalized for pneumonia earlier this year. Per record review, an incidental mass was noted an 8.7 cm lipomatous mass in the retroperitoneum near the kidney and spleen. He was evaluated by surgical oncology, Dr. Ojeda, with biopsy on 3/8/22 returning as lipomatous tumor/liposarcoma, negative for MDM2. He is due for follow up imaging in September 2022 with follow in surg onc clinic.    A repeat CT chest was done on 3/23/22, which returned with significant improvement RLL pneumonia. It continued to show the 8.7 x 7.9 x 9.3 cm RP mass in the LUQ. The spleen was enlarged to 15.5 cm. No other adenopathy.     Patient is present with mother. They report only new medication is liraglutide for weight management. No weight loss, fever, drenching night sweats, or LAD.     Of note, patient lives at [facility]. He is able to perform ADLs. He does smoke cigarettes.       INTERIM HISTORY:  Most recent scans showed stable RP mass, resolution of the hepatic lesion at segment 8 and stable segment 6/7 lesion.  He states one time episode of BRBPR (last colonoscopy 2020, this was performed due to colitis in mother, told 5 year follow up). He has chronic left hip pain s/p MARGO.     He is present today with a caregiver. No acute events. He states he is due for orthopedic surgery perhaps sometime in December. No weight loss, fever, LAD.      REVIEW OF SYSTEMS:   A 14 point ROS was reviewed with pertinent positives and negatives in the HPI.        HOME MEDICATIONS:  Current Outpatient Medications   Medication Sig Dispense Refill     albuterol (VENTOLIN HFA) 108 (90 Base) MCG/ACT inhaler INHALE 2 PUFFS INTO LUNGS EVERY SIX HOURS AS NEEDED  FOR SHORTNESS OF BREATH / DYSPNEA OR WHEEZING 18 g 1     calcium polycarbophil (FIBER-LAX) 625 MG tablet Take 2 tablets (1,250 mg) by mouth 2 times daily 360 tablet 0     DULoxetine (CYMBALTA) 60 MG capsule Take 120 mg by mouth daily        hydrochlorothiazide (HYDRODIURIL) 25 MG tablet Take 1 tablet (25 mg) by mouth daily 30 tablet 7     Lidocaine (LIDOCARE) 4 % Patch Place 1-2 patches onto the skin every 24 hours To prevent lidocaine toxicity, patient should be patch free for 12 hrs daily. (Patient taking differently: Place onto the skin every 24 hours To prevent lidocaine toxicity, patient should be patch free for 12 hrs daily.) 60 patch 1     lisinopril (ZESTRIL) 10 MG tablet Take 1 tablet (10 mg) by mouth every morning 30 tablet 7     mirabegron (MYRBETRIQ) 50 MG 24 hr tablet Take 1 tablet by mouth daily       OLANZapine-samidorphan (LYBALVI) 10-10 MG TABS tablet Take 1 tablet by mouth At Bedtime       order for DME Equipment being ordered: CPAP supplies 1 each 0     oxybutynin ER (DITROPAN XL) 15 MG 24 hr tablet Take 30 mg by mouth daily       pantoprazole (PROTONIX) 40 MG EC tablet TAKE 1 TABLET BY MOUTH ONCE DAILY 30-60 MINUTES BR FIRST MEAL OF THE DAY 30 tablet 7     prazosin (MINIPRESS) 1 MG capsule Take 3 mg by mouth At Bedtime       Semaglutide-Weight Management (WEGOVY) 1.7 MG/0.75ML pen Inject 1.7 mg Subcutaneous once a week 9 mL 1     vitamin B complex with vitamin C (VITAMIN  B COMPLEX) tablet Take 1 tablet by mouth daily       VITAMIN D3 50 MCG (2000 UT) tablet TAKE ONE TABLET BY MOUTH EVERY DAY 90 tablet 3         ALLERGIES:  Allergies   Allergen Reactions     Other [No Clinical Screening - See Comments] Other (See Comments)     Awoke from anesthesia with anger and ripping off dressing, after interstim placement, outside hospital 2013         PAST MEDICAL HISTORY:  Past Medical History:   Diagnosis Date     Arthritis     DDD hips and knees     Asthma      Asthma      Patel's esophagus       Chronic pain      Chronic pain - left hip/leg pain     degenerative disc disease, back, hips, knees     Gastroesophageal reflux disease      History of anesthesia complications     agitation x1 after interstim 2013     Hypertension      Hypertension      Leukocytosis      LPRD (laryngopharyngeal reflux disease)      Marijuana abuse      Marijuana abuse      MDD (major depressive disorder)      MDD (major depressive disorder)      Mental retardation, mild (I.Q. 50-70)      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    With associated speech/language delay     Obesity 11/11/2010     LOREN (obstructive sleep apnea)     Uses a CPAP     LOREN (obstructive sleep apnea)      Seborrheic dermatitis      Seborrheic dermatitis      Sleep apnea     CPAP         PAST SURGICAL HISTORY:  Past Surgical History:   Procedure Laterality Date     ARTHROPLASTY HIP Left 1/25/2017    Procedure: ARTHROPLASTY HIP;  Surgeon: Bala Randall MD;  Location:  OR     ARTHROPLASTY HIP Left      ARTHROPLASTY REVISION HIP Left 6/24/2019    Procedure: Revision left total hip arthroplasty with a head and liner exchange to a Biomet dual mobility head and liner.;  Surgeon: Bala Randall MD;  Location:  OR     BLADDER SURGERY      Ibarra, [facility],Interstem stimulator implant     CLOSED REDUCTION HIP Left 6/10/2019    Procedure: Closed reduction under general anesthesia left recurrent prosthetic hip dislocation;  Surgeon: Rafat Cazares MD;  Location:  OR     COLONOSCOPY N/A 10/30/2015    Procedure: COMBINED COLONOSCOPY, SINGLE OR MULTIPLE BIOPSY/POLYPECTOMY BY BIOPSY;  Surgeon: Alexis Jackson MD;  Location:  GI     COLONOSCOPY N/A 11/17/2016    Procedure: COMBINED COLONOSCOPY, SINGLE OR MULTIPLE BIOPSY/POLYPECTOMY BY BIOPSY;  Surgeon: Cat Huber MD;  Location:  GI     COLONOSCOPY N/A 10/28/2020    Procedure: COLONOSCOPY;  Surgeon: You Kraus MD;  Location:  OR     COLONOSCOPY        ESOPHAGOSCOPY, GASTROSCOPY, DUODENOSCOPY (EGD), COMBINED N/A 11/17/2016    Procedure: COMBINED ESOPHAGOSCOPY, GASTROSCOPY, DUODENOSCOPY (EGD), BIOPSY SINGLE OR MULTIPLE;  Surgeon: Cat Huber MD;  Location: [facility] GI     ESOPHAGOSCOPY, GASTROSCOPY, DUODENOSCOPY (EGD), COMBINED N/A 3/12/2020    Procedure: ESOPHAGOGASTRODUODENOSCOPY WITH BIOPSIES;  Surgeon: You Kraus MD;  Location: [facility] OR     ESOPHAGOSCOPY, GASTROSCOPY, DUODENOSCOPY (EGD), COMBINED N/A 10/28/2020    Procedure: ESOPHAGOGASTRODUODENOSCOPY, WITH BIOPSY;  Surgeon: You Kraus MD;  Location: [facility] OR     ESOPHAGOSCOPY, GASTROSCOPY, DUODENOSCOPY (EGD), COMBINED       EXAM UNDER ANESTHESIA, FULGURATE CONDYLOMA ANUS, COMBINED N/A 12/22/2016    Procedure: COMBINED EXAM UNDER ANESTHESIA, FULGURATE CONDYLOMA ANUS;  Surgeon: Nya Cabello MD;  Location: [facility] OR     GENITOURINARY SURGERY       JOINT REPLACEMENT Left 2017    MARGO, Revision Left MARGO     OTHER SURGICAL HISTORY      interstim stimulator implant     ME CYSTOURETHROSCOPY INJ CHEMODENERVATION BLADDER N/A 1/16/2019    Procedure: CYSTOSCOPY BOTOX BLADDER INJECTIONS (100 UNITS IN 10CC);  Surgeon: Didier Ibarra MD;  Location: [facility];  Service: Urology     ME IMPLANT PERIPH/GASTRIC NEUROSTIM/ N/A 1/8/2020    Procedure: NEW INTERSTIM LEAD, REPLACE OLD; NEW INTERSTIM BATTERY, REPLACE OLD;  Surgeon: Didier Ibarra MD;  Location: [facility];  Service: Urology         SOCIAL HISTORY:  Social History     Socioeconomic History     Marital status: Single     Spouse name: Not on file     Number of children: Not on file     Years of education: Not on file     Highest education level: Not on file   Occupational History     Not on file   Tobacco Use     Smoking status: Every Day     Packs/day: 1.00     Years: 1.00     Pack years: 1.00     Types: Vaping Device, Cigarettes     Passive exposure: Current     Smokeless tobacco: Current     Types: Chew     Tobacco  "comments:     Smokes E Cigs equal to 1 PPD   Vaping Use     Vaping Use: Every day     Substances: Nicotine, Flavoring     Devices: Refillable tank   Substance and Sexual Activity     Alcohol use: Yes     Comment: socially--\"not very often\" \"once a month\"     Drug use: No     Comment: patient denies any marijuana use     Sexual activity: Yes     Partners: Female     Birth control/protection: Condom   Other Topics Concern     Parent/sibling w/ CABG, MI or angioplasty before 65F 55M? No   Social History Narrative     Not on file     Social Determinants of Health     Financial Resource Strain: Not on file   Food Insecurity: Not on file   Transportation Needs: Not on file   Physical Activity: Not on file   Stress: Not on file   Social Connections: Not on file   Intimate Partner Violence: Not At Risk (12/30/2022)    Humiliation, Afraid, Rape, and Kick questionnaire      Fear of Current or Ex-Partner: No      Emotionally Abused: No      Physically Abused: No      Sexually Abused: No   Housing Stability: Not on file         FAMILY HISTORY:  Family History   Problem Relation Age of Onset     Anxiety Disorder Mother      Depression Mother      Ulcerative Colitis Mother 18     Breast Cancer Maternal Grandmother      Cerebrovascular Disease Maternal Grandmother      Breast Cancer Maternal Aunt      Cancer Maternal Grandfather         grt uncles colon cancer         PHYSICAL EXAM:  Vital signs:  /83   Pulse 87   Temp 98.3  F (36.8  C) (Oral)   Resp 16   SpO2 97%      GENERAL/CONSTITUTIONAL: No acute distress.  RESPIRATORY: Clear to auscultation bilaterally. No crackles or wheezing.   CARDIOVASCULAR: Regular rate and rhythm without murmurs, gallops, or rubs.  NEUROLOGIC: Cranial nerves II-XII are intact. Alert, oriented, answers questions appropriately.  INTEGUMENTARY: No rashes or jaundice.  GAIT: In wheelchair.      LABS:   Latest Reference Range & Units 06/27/23 14:20   Sodium 136 - 145 mmol/L 135 (L)   Potassium 3.4 " - 5.3 mmol/L 3.6   Chloride 98 - 107 mmol/L 100   Carbon Dioxide (CO2) 22 - 29 mmol/L 24   Urea Nitrogen 6.0 - 20.0 mg/dL 23.9 (H)   Creatinine 0.67 - 1.17 mg/dL 1.22 (H)   GFR Estimate >60 mL/min/1.73m2 76   Calcium 8.6 - 10.0 mg/dL 9.5   Anion Gap 7 - 15 mmol/L 11   Albumin 3.5 - 5.2 g/dL 4.5   Protein Total 6.4 - 8.3 g/dL 7.6   Alkaline Phosphatase 40 - 129 U/L 78   ALT 0 - 70 U/L 31   AST 0 - 45 U/L 17   Bilirubin Total <=1.2 mg/dL 0.6   Ferritin 31 - 409 ng/mL 203   Folate 4.6 - 34.8 ng/mL 11.8   Glucose 70 - 99 mg/dL 109 (H)   Iron 61 - 157 ug/dL 81   Iron Binding Capacity 240 - 430 ug/dL 301   Iron Sat Index 15 - 46 % 27   TSH 0.30 - 4.20 uIU/mL 2.84   Vitamin B12 232 - 1,245 pg/mL 407   WBC 4.0 - 11.0 10e3/uL 18.2 (H)   Hemoglobin 13.3 - 17.7 g/dL 14.0   Hematocrit 40.0 - 53.0 % 39.5 (L)   Platelet Count 150 - 450 10e3/uL 287   RBC Count 4.40 - 5.90 10e6/uL 4.71   MCV 78 - 100 fL 84   MCH 26.5 - 33.0 pg 29.7   MCHC 31.5 - 36.5 g/dL 35.4   RDW 10.0 - 15.0 % 13.1   % Neutrophils % 82   % Lymphocytes % 12   % Monocytes % 5   % Eosinophils % 0   % Basophils % 0   Absolute Basophils 0.0 - 0.2 10e3/uL 0.1   Absolute Eosinophils 0.0 - 0.7 10e3/uL 0.0   Absolute Immature Granulocytes <=0.4 10e3/uL 0.1   Absolute Lymphocytes 0.8 - 5.3 10e3/uL 2.1   Absolute Monocytes 0.0 - 1.3 10e3/uL 0.9   % Immature Granulocytes % 1   Absolute Neutrophils 1.6 - 8.3 10e3/uL 15.0 (H)   Absolute NRBCs 10e3/uL 0.0   NRBCs per 100 WBC <1 /100 0     Interpretation    No mutations were identified in the analyzed gene regions of JAK2, CALR, or MPL genes.     RESULTS    BCR::ABL1 MINOR(p190) QUANTITATION RESULTS:  BCR::ABL1/ABL1 minor(p190):  UNDETECTABLE     INTERNAL CONTROL (NUMBER OF ABL1 TRANSCRIPTS): 8,380   INTERPRETATION    Molecular testing performed on submitted Blood.     BCR::ABL1 transcripts of the minor(p190) breakpoint cluster regions are undetectable in this sample.     BCR-ABL p210 pending        PATHOLOGY:  Surgical  Pathology Report                         Case: [id number]                                   Authorizing Provider:  Star Ojeda MD         Collected:           03/08/2022 11:21 AM           Ordering Location:     [facility]   Received:            03/08/2022 01:49 PM                                  Imaging                                                                       Pathologist:           Heather Sidhu MD                                                            Specimen:    Retroperitoneal, Retroperitoneal Biopsy                                                    Final Diagnosis   Retroperitoneal mass, CT-guided needle biopsy:  -Mature adipose tissue, consistent with lipomatous tumor.  Limited sample.  See COMMENT.   Electronically signed by Heather Sidhu MD on 3/14/2022 at  2:36 PM   Comment    The morphologic findings are concerning for liposarcoma.  The biopsy is limited in nature, comprising of needle biopsy cores of lipomatous tissue.  The biopsy tissue may not be representative of the radiographically identified large (reported 8.7 cm) mass.  Fluorescence in-situ hybridization (FISH) for MDM2 probe is pending at this time (please see separate forthcoming [facility] cytogenetics laboratory report for results).       INTERPRETATION:  None (0%) of the interphase cells examined had a signal pattern indicative of amplification of MDM2.      Final Diagnosis 7/21/22:   Peripheral blood for morphology:  -Mild normochromic, normocytic anemia without evidence of red cell regeneration  -Leukocytosis with mature neutrophilia; leukocytes without morphologic abnormalities     Final Diagnosis 8/25/22:   Peripheral blood for morphology:  -Mild normochromic, normocytic anemia without evidence of red cell regeneration  -Leukocytosis with mature neutrophilia; leukocytes without morphologic abnormalities       Case Report   Flow Cytometry Report                             Case:  [id number]                                   Authorizing Provider:  Danna Amato DO         Collected:           08/25/2022 08:36 AM           Ordering Location:     [facility]   Received:            08/25/2022 09:33 AM                                  [facility]                                                             Pathologist:           Denise Lee MD                                                     Specimen:    Peripheral Blood                                                                           Flow Interpretation   A. Peripheral Blood:  -Polytypic B cells  -No aberrant immunophenotype on T cells  -Rare to absent CD34-positive blasts  -See comment   Electronically signed by Denise Lee MD on 8/26/2022 at 12:10 PM   Comment    There is no immunophenotypic evidence of non-Hodgkin lymphoma, lymphoid leukemia, or high grade myeloid neoplasm.         IMAGING:  CT CHEST WITHOUT CONTRAST March 23, 2022  FINDINGS:   Chest/mediastinum: No cardiomegaly. Small pericardial effusion. No  significant mediastinal, hilar or axillary lymphadenopathy. Asymmetric  elevation of the left hemidiaphragm as compared to the right.     Lung/pleura: Trace left pleural effusion. No significant right pleural  effusion. No significant pneumothorax. Significant interval  improvement in the previously seen right lower lobe consolidative  pulmonary opacities as compared to 2/19/2022 outside CT. Only small  patchy foci of opacity in the medial aspect of the right lower lobe is  seen.      Upper abdomen: Limited evaluation of the upper abdomen due to lack of  coverage and contrast. Cholelithiasis without CT evidence of acute  cholecystitis. There is approximately 8.7 x 7.9 x 9.3 cm (axial and CC  dimensions, respectively), fatty area with minimal fat stranding in  the left upper quadrant retroperitoneal space just above the superior  pole of the kidney and abutting the adjacent adrenal  gland, kidney,  pancreas and spleen. The spleen is enlarged measuring 15.5 cm in  craniocaudal dimension.     Bones and soft tissue: Multilevel degenerative changes of the spine.  Diffuse heterogenous appearance of the spine of indeterminate  etiology.                                                                      IMPRESSION:    1. Significant interval improvement in the previously seen right lower  lobe consolidative pulmonary opacity as compared to 2/19/2022 outside  CT. Only small patchy opacity in the medial aspect of the right lower  lobe remaining.  2. Small pericardial effusion.  3. Trace left pleural effusion.  4. There is approximately 8.7 x 7.9 x 9.3 cm fat attenuating area with  minimal fat stranding in the left upper quadrant, consistent with the  biopsy-proven lipomatous tumor.  5. Cholelithiasis without CT evidence of acute cholecystitis.  6. Splenomegaly.  7. Multilevel degenerative changes of the spine with diffuse  heterogenous appearance of the thoracic spine of indeterminate  Etiology.    CT AP 9/20/22:  IMPRESSION:   1.  There are two fat-containing left retroperitoneal masses that  appears stable in size. One of these at the left adrenal fossa  contacting the left adrenal and left kidney measures up to 10.5 cm  with ill-defined internal increased density. Another at the left  paraspinous retroperitoneum associated with atrophy of the left psoas  is also stable. The second lesion has hazy internal increased density  as well. This could just be stable complex lipomas. More aggressive  forms of retroperitoneal fat containing mass such as liposarcoma  cannot be entirely excluded. Therefore, recommend a surveillance CT to  establish long-term stability. If there are older studies that  includes this lesion for imaging, these would be useful for review.  Recommend surveillance CT in six to 12 months.  2.  There are two hypodense but indeterminate nodules within the  liver. One of these appears  stable, but another is new at the right  hepatic dome compared to 2/19/2022. Therefore, recommend liver MRI for  further imaging workup.  3.  Cholelithiasis.    CT Liver 11/17/22:  IMPRESSION:   1.  The previously described new hepatic nodular lesion at segment 8  is not currently seen. The previously described hypodense nodular mass  at the posterior right liver along segment 6/7 is stable in size, but  remains indeterminant with a neoplastic etiology being possible.  Recommend follow-up imaging to establish long-term stability.  2.  Retroperitoneal fat density lesions appears stable in size. One of  these abuts the left adrenal and the second is at the left psoas  region with psoas atrophy. Recommend further attention to these at  imaging follow-up.  3.  Cholelithiasis.      ASSESSMENT/PLAN:  Kimo Greenwood is a 42 year old male who is referred for elevated WBC, anemia, splenomegaly. 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    1) Chronic anemia and intermittent neutrophilia  -Patient has evidence of chronic, intermittent neutrophilia and NC/NC anemia.  -Asymptomatic.  -Repeat CBC in July 2022 with persistent neutrophilia and NC/NC anemia. Negative for dysplasia or evidence of blasts.   -Iron studies, B12, folate, TSH WNL in July 2022.  -FLOW cytometry normal 7/2022.  -CT CAP is showing normal spleen size.   -Over the last year, WBC has ranged 10-18 with ANC  7-16. Currently, WBC is 18.2, ANC 15. Normal Hb and platelets.  -JAK2/MPL/CALR negative.   -BCR-ABL p190 negative; p210 pending.  -Hold on bone marrow biopsy at this time.  -Recheck in one year.    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  -9/2022 scans show stability.  -11/2022 scans show stability.  -6/2023 scans show  stability.  -Followed by Dr. Ojeda.    3) Liver lesions  -CT 11/17/22 did not visualize the previous lesion at segment 9; he still has a stable nodular mass of the posterior right liver 6/7.  -Repeat imaging 6/2023 unable to visualize.    4) History of MDD    5) History of HTN, LOREN    6)  BRBPR, one time episode  -Patient with history of EGD and colonoscopy in 2020 and told 5 year follow up. Patient has history of hemorrhoids. Discussed if this is continued, he will need repeat endoscopy.     7) Hyperglycemia  -Follow up with PCP.     8) -Repeat CBC and CT Liver in 1 year prior to follow up with [facility].      Danna Amato DO  Hematology/Oncology  [facility]          Again, thank you for allowing me to participate in the care of your patient.        Sincerely,        Danna Amato DO

## 2023-07-18 NOTE — TELEPHONE ENCOUNTER
Last Rx prescribed by MIRNA. Please review and advise.     Matthew LIVE RN    
Oriented - self; Oriented - place; Oriented - time

## 2023-07-27 DIAGNOSIS — E66.01 CLASS 3 SEVERE OBESITY DUE TO EXCESS CALORIES WITH SERIOUS COMORBIDITY AND BODY MASS INDEX (BMI) OF 50.0 TO 59.9 IN ADULT (H): ICD-10-CM

## 2023-07-27 DIAGNOSIS — E66.813 CLASS 3 SEVERE OBESITY DUE TO EXCESS CALORIES WITH SERIOUS COMORBIDITY AND BODY MASS INDEX (BMI) OF 50.0 TO 59.9 IN ADULT (H): ICD-10-CM

## 2023-07-31 NOTE — TELEPHONE ENCOUNTER
Semaglutide-Weight Management (WEGOVY) 1.7 MG/0.75ML pen      Last Written Prescription Date:  6/8/23  Last Fill Quantity: 9ml,   # refills: 1  Last Office Visit : 6/8/23  Future Office visit:  9/7/23    Refused, requested too soon.    M left for [place] informed of refusal with rationale.

## 2023-08-08 ENCOUNTER — OFFICE VISIT (OUTPATIENT)
Dept: URGENT CARE | Facility: URGENT CARE | Age: 43
End: 2023-08-08
Payer: COMMERCIAL

## 2023-08-08 ENCOUNTER — LAB (OUTPATIENT)
Dept: LAB | Facility: CLINIC | Age: 43
End: 2023-08-08
Payer: COMMERCIAL

## 2023-08-08 VITALS — DIASTOLIC BLOOD PRESSURE: 76 MMHG | SYSTOLIC BLOOD PRESSURE: 117 MMHG | OXYGEN SATURATION: 98 % | HEART RATE: 75 BPM

## 2023-08-08 DIAGNOSIS — R73.09 ELEVATED GLUCOSE: ICD-10-CM

## 2023-08-08 DIAGNOSIS — T23.221A PARTIAL THICKNESS BURN OF FINGER OF RIGHT HAND, INITIAL ENCOUNTER: Primary | ICD-10-CM

## 2023-08-08 LAB — HBA1C MFR BLD: 5.4 % (ref 0–5.6)

## 2023-08-08 PROCEDURE — 99213 OFFICE O/P EST LOW 20 MIN: CPT | Performed by: PHYSICIAN ASSISTANT

## 2023-08-08 PROCEDURE — 36415 COLL VENOUS BLD VENIPUNCTURE: CPT

## 2023-08-08 PROCEDURE — 83036 HEMOGLOBIN GLYCOSYLATED A1C: CPT

## 2023-08-08 RX ORDER — SILVER SULFADIAZINE 10 MG/G
CREAM TOPICAL 2 TIMES DAILY
Qty: 50 G | Refills: 0 | Status: SHIPPED | OUTPATIENT
Start: 2023-08-08 | End: 2023-08-18

## 2023-08-08 NOTE — PROGRESS NOTES
Assessment & Plan     Partial thickness burn of finger of right hand, initial encounter  Acute problem.  No evidence of secondary bacterial infection at this time.  Fluid drained from the blister as below, bacitracin ointment applied then dressing with Telfa and Coban. Silvadene cream prescription sent to the pharmacy.  Recommended application a couple times daily for the next week.  Wound care instructions are provided.  Patient educational information provided regarding course of symptoms.  Follow-up if any worsening symptoms.  Patient agrees with the plan.  - silver sulfADIAZINE (SILVADENE) 1 % external cream  Dispense: 50 g; Refill: 0       Return in about 10 days (around 8/18/2023) for Symptoms failing to improve.    Guadalupe Beatty PA-C  [facility] SOPHIE Lamar is a 42 year old male who presents to clinic today for the following health issues:  Chief Complaint   Patient presents with    Urgent Care     Pt states he got burned on Saturday and was cooking and his hand got burn. Pt has a blister on right index finger. States fired went up to his face but didn't burn his face only hand.      HPI    Patient is presenting to urgent care today with a complaint of burn injury of right index finger.  Onset of symptoms 2 days ago.  Patient reports a blister at the affected area since last night.  Treatment tried: None.  He denies pain.      Review of Systems  Constitutional, HEENT, cardiovascular, pulmonary, GI, , musculoskeletal, neuro, skin, endocrine and psych systems are negative, except as otherwise noted.      Objective    /76 (BP Location: Left arm, Patient Position: Sitting, Cuff Size: Adult Large)   Pulse 75   SpO2 98%   Physical Exam   GENERAL: healthy, alert and no distress  SKIN: There is a large blister approximately 2 cm in length on the dorsum of the proximal right index finger.  No surrounding erythema.  Range of motion of the right index finger is  normal. No TTP.  Affected area cleaned with alcohol and with an 18-gauge needle a small incision is made, draining serous fluid from the blister.  Bacitracin ointment a put then dressing with telfa and Coban. He tolerated this well.

## 2023-08-25 DIAGNOSIS — E66.01 CLASS 3 SEVERE OBESITY DUE TO EXCESS CALORIES WITH SERIOUS COMORBIDITY AND BODY MASS INDEX (BMI) OF 50.0 TO 59.9 IN ADULT (H): ICD-10-CM

## 2023-08-25 DIAGNOSIS — E66.813 CLASS 3 SEVERE OBESITY DUE TO EXCESS CALORIES WITH SERIOUS COMORBIDITY AND BODY MASS INDEX (BMI) OF 50.0 TO 59.9 IN ADULT (H): ICD-10-CM

## 2023-08-29 ENCOUNTER — TELEPHONE (OUTPATIENT)
Dept: FAMILY MEDICINE | Facility: CLINIC | Age: 43
End: 2023-08-29
Payer: COMMERCIAL

## 2023-08-29 NOTE — TELEPHONE ENCOUNTER
Pts sister calling asking why some labs were not followed up on     RN reviewed notes on labs via my chart discussions and it was recommended that he made a VV to review and discuss     Patient's sister stated an understanding and agreed with plan.    Iris Solitario RN, BSN  [facility] - [facility]

## 2023-08-29 NOTE — TELEPHONE ENCOUNTER
Semaglutide-Weight Management (WEGOVY) 1.7 MG/0.75ML pen     9 mL 1 6/8/2023     Last Office Visit : 6-8-2023  Future Office visit:  9-7-2023      Labs completed on :  6-27- 2023  Creatinine 0.67 - 1.17 mg/dL 1.22

## 2023-08-30 ENCOUNTER — TELEPHONE (OUTPATIENT)
Dept: GASTROENTEROLOGY | Facility: CLINIC | Age: 43
End: 2023-08-30
Payer: COMMERCIAL

## 2023-08-30 NOTE — TELEPHONE ENCOUNTER
"Endoscopy Scheduling Screen    Have you had a positive Covid test in the last 14 days?  No    Are you active on MyChart?   Yes    What insurance is in the chart?  Other:  [employer]    Ordering/Referring Provider:     Ciro Love MD in [facility]      (If ordering provider performs procedure, schedule with ordering provider unless otherwise instructed. )    BMI: Estimated body mass index is 52.24 kg/m  as calculated from the following:    Height as of 6/20/23: 1.829 m (6').    Weight as of 6/20/23: 174.7 kg (385 lb 3.2 oz).     Sedation Ordered  moderate sedation.   If patient BMI > 50 do not schedule in [facility].    Are you taking any prescription medications for pain?   No    Are you taking methadone or Suboxone?  No    Do you have a history of malignant hyperthermia or adverse reaction to anesthesia?  No    (Females) Are you currently pregnant?   No     Have you been diagnosed or told you have pulmonary hypertension?   No    Do you have an LVAD?  No    Have you been told you have moderate to severe sleep apnea?  Yes (RN Review required for scheduling unless scheduling in Hospital.)    Have you been told you have COPD, asthma, or any other lung disease?  No    Do you have any heart conditions?  No     Have you ever had or are you awaiting a heart or lung transplant?   No    Have you had a stroke or transient ischemic attack (TIA aka \"mini stroke\" in the last 6 months?   No    Have you been diagnosed with or been told you have cirrhosis of the liver?   No    Are you currently on dialysis?   No    Do you need assistance transferring?   No    BMI: Estimated body mass index is 52.24 kg/m  as calculated from the following:    Height as of 6/20/23: 1.829 m (6').    Weight as of 6/20/23: 174.7 kg (385 lb 3.2 oz).     Is patients BMI > 40 and scheduling location [facility]?  No    Do you take the medication Phentermine, Ozempic or Wegovy?  Yes Recommended hold time is 7 days before your procedure. Please contact your " prescribing provider to discuss.    Do you take the medication Naltrexone?  No    Do you take blood thinners?  No      Prep   Are you currently on dialysis or do you have chronic kidney disease?  No    Do you have a diagnosis of diabetes?  No    Do you have a diagnosis of cystic fibrosis (CF)?  No    On a regular basis do you go 3 -5 days between bowel movements?  No    BMI > 40?  No    Preferred Pharmacy:    [id number] - [place], [place] - [address]  [address]  [address]  [facility] [address]  Phone: [phone] Fax: [phone]      Final Scheduling Details   Colonoscopy prep sent?  Golytely Extended Prep    Procedure scheduled  Colonoscopy    Surgeon:  Efra     Date of procedure:  11/29/2023     Schedule PAC:   No    Location  [facility]    Sedation   Moderate Sedation    Patient Reminders:   You will receive a call from a Nurse to review instructions and health history.  This assessment must be completed prior to your procedure.  Failure to complete the Nurse assessment may result in the procedure being cancelled.      On the day of your procedure, please designate an adult(s) who can drive you home stay with you for the next 24 hours. The medicines used in the exam will make you sleepy. You will not be able to drive.      You cannot take public transportation, ride share services, or non-medical taxi service without a responsible caregiver.  Medical transport services are allowed with the requirement that a responsible caregiver will receive you at your destination.  We require that drivers and caregivers are confirmed prior to your procedure.

## 2023-09-01 ENCOUNTER — OFFICE VISIT (OUTPATIENT)
Dept: FAMILY MEDICINE | Facility: CLINIC | Age: 43
End: 2023-09-01
Payer: COMMERCIAL

## 2023-09-01 ENCOUNTER — HOSPITAL ENCOUNTER (EMERGENCY)
Facility: CLINIC | Age: 43
Discharge: HOME | End: 2023-09-01
Attending: EMERGENCY MEDICINE | Admitting: EMERGENCY MEDICINE
Payer: COMMERCIAL

## 2023-09-01 ENCOUNTER — ANCILLARY PROCEDURE (OUTPATIENT)
Dept: GENERAL RADIOLOGY | Facility: CLINIC | Age: 43
End: 2023-09-01
Attending: NURSE PRACTITIONER
Payer: COMMERCIAL

## 2023-09-01 ENCOUNTER — APPOINTMENT (OUTPATIENT)
Dept: CT IMAGING | Facility: CLINIC | Age: 43
End: 2023-09-01
Attending: EMERGENCY MEDICINE
Payer: COMMERCIAL

## 2023-09-01 VITALS
RESPIRATION RATE: 14 BRPM | WEIGHT: 315 LBS | DIASTOLIC BLOOD PRESSURE: 77 MMHG | HEART RATE: 83 BPM | HEIGHT: 71 IN | BODY MASS INDEX: 44.1 KG/M2 | SYSTOLIC BLOOD PRESSURE: 121 MMHG | OXYGEN SATURATION: 98 %

## 2023-09-01 VITALS
OXYGEN SATURATION: 97 % | TEMPERATURE: 98.5 F | SYSTOLIC BLOOD PRESSURE: 115 MMHG | DIASTOLIC BLOOD PRESSURE: 66 MMHG | RESPIRATION RATE: 24 BRPM | HEART RATE: 74 BPM

## 2023-09-01 DIAGNOSIS — R07.9 CHEST PAIN, UNSPECIFIED TYPE: ICD-10-CM

## 2023-09-01 DIAGNOSIS — R79.89 ELEVATED SERUM CREATININE: ICD-10-CM

## 2023-09-01 DIAGNOSIS — R07.9 NONSPECIFIC CHEST PAIN: ICD-10-CM

## 2023-09-01 DIAGNOSIS — R07.9 CHEST PAIN, UNSPECIFIED TYPE: Primary | ICD-10-CM

## 2023-09-01 DIAGNOSIS — L02.92 BOIL: ICD-10-CM

## 2023-09-01 DIAGNOSIS — D35.02 ADRENAL ADENOMA, LEFT: ICD-10-CM

## 2023-09-01 DIAGNOSIS — N32.81 OVERACTIVE BLADDER: Primary | ICD-10-CM

## 2023-09-01 DIAGNOSIS — J45.20 MILD INTERMITTENT ASTHMA WITHOUT COMPLICATION: ICD-10-CM

## 2023-09-01 LAB
ANION GAP SERPL CALCULATED.3IONS-SCNC: 10 MMOL/L (ref 7–15)
BASOPHILS # BLD AUTO: 0.1 10E3/UL (ref 0–0.2)
BASOPHILS NFR BLD AUTO: 0 %
BUN SERPL-MCNC: 18.1 MG/DL (ref 6–20)
CALCIUM SERPL-MCNC: 9.3 MG/DL (ref 8.6–10)
CHLORIDE SERPL-SCNC: 97 MMOL/L (ref 98–107)
CREAT SERPL-MCNC: 1.25 MG/DL (ref 0.67–1.17)
D DIMER PPP FEU-MCNC: 0.9 UG/ML FEU (ref 0–0.5)
DEPRECATED HCO3 PLAS-SCNC: 27 MMOL/L (ref 22–29)
EOSINOPHIL # BLD AUTO: 0.1 10E3/UL (ref 0–0.7)
EOSINOPHIL NFR BLD AUTO: 1 %
ERYTHROCYTE [DISTWIDTH] IN BLOOD BY AUTOMATED COUNT: 14 % (ref 10–15)
GFR SERPL CREATININE-BSD FRML MDRD: 74 ML/MIN/1.73M2
GLUCOSE SERPL-MCNC: 104 MG/DL (ref 70–99)
HCT VFR BLD AUTO: 37 % (ref 40–53)
HGB BLD-MCNC: 12.5 G/DL (ref 13.3–17.7)
IMM GRANULOCYTES # BLD: 0.1 10E3/UL
IMM GRANULOCYTES NFR BLD: 0 %
LYMPHOCYTES # BLD AUTO: 2.9 10E3/UL (ref 0.8–5.3)
LYMPHOCYTES NFR BLD AUTO: 19 %
MCH RBC QN AUTO: 29.2 PG (ref 26.5–33)
MCHC RBC AUTO-ENTMCNC: 33.8 G/DL (ref 31.5–36.5)
MCV RBC AUTO: 86 FL (ref 78–100)
MONOCYTES # BLD AUTO: 0.7 10E3/UL (ref 0–1.3)
MONOCYTES NFR BLD AUTO: 4 %
NEUTROPHILS # BLD AUTO: 11.6 10E3/UL (ref 1.6–8.3)
NEUTROPHILS NFR BLD AUTO: 76 %
NRBC # BLD AUTO: 0 10E3/UL
NRBC BLD AUTO-RTO: 0 /100
PLAT MORPH BLD: ABNORMAL
PLATELET # BLD AUTO: 258 10E3/UL (ref 150–450)
POTASSIUM SERPL-SCNC: 3.4 MMOL/L (ref 3.4–5.3)
RBC # BLD AUTO: 4.28 10E6/UL (ref 4.4–5.9)
RBC MORPH BLD: ABNORMAL
SODIUM SERPL-SCNC: 134 MMOL/L (ref 136–145)
TROPONIN T SERPL HS-MCNC: 11 NG/L
WBC # BLD AUTO: 15.4 10E3/UL (ref 4–11)

## 2023-09-01 PROCEDURE — 36415 COLL VENOUS BLD VENIPUNCTURE: CPT | Performed by: EMERGENCY MEDICINE

## 2023-09-01 PROCEDURE — 36415 COLL VENOUS BLD VENIPUNCTURE: CPT | Performed by: NURSE PRACTITIONER

## 2023-09-01 PROCEDURE — 93010 ELECTROCARDIOGRAM REPORT: CPT | Performed by: INTERNAL MEDICINE

## 2023-09-01 PROCEDURE — 71275 CT ANGIOGRAPHY CHEST: CPT

## 2023-09-01 PROCEDURE — 85379 FIBRIN DEGRADATION QUANT: CPT | Performed by: NURSE PRACTITIONER

## 2023-09-01 PROCEDURE — 80048 BASIC METABOLIC PNL TOTAL CA: CPT | Performed by: EMERGENCY MEDICINE

## 2023-09-01 PROCEDURE — 71046 X-RAY EXAM CHEST 2 VIEWS: CPT | Mod: TC | Performed by: RADIOLOGY

## 2023-09-01 PROCEDURE — 250N000011 HC RX IP 250 OP 636: Mod: JZ | Performed by: EMERGENCY MEDICINE

## 2023-09-01 PROCEDURE — 99285 EMERGENCY DEPT VISIT HI MDM: CPT | Mod: 25

## 2023-09-01 PROCEDURE — 93005 ELECTROCARDIOGRAM TRACING: CPT | Performed by: NURSE PRACTITIONER

## 2023-09-01 PROCEDURE — 250N000011 HC RX IP 250 OP 636: Performed by: EMERGENCY MEDICINE

## 2023-09-01 PROCEDURE — 84484 ASSAY OF TROPONIN QUANT: CPT | Performed by: NURSE PRACTITIONER

## 2023-09-01 PROCEDURE — 96374 THER/PROPH/DIAG INJ IV PUSH: CPT | Mod: 59

## 2023-09-01 PROCEDURE — 93005 ELECTROCARDIOGRAM TRACING: CPT

## 2023-09-01 PROCEDURE — 85025 COMPLETE CBC W/AUTO DIFF WBC: CPT | Performed by: EMERGENCY MEDICINE

## 2023-09-01 PROCEDURE — 99214 OFFICE O/P EST MOD 30 MIN: CPT | Performed by: NURSE PRACTITIONER

## 2023-09-01 RX ORDER — KETOROLAC TROMETHAMINE 15 MG/ML
15 INJECTION, SOLUTION INTRAMUSCULAR; INTRAVENOUS ONCE
Status: COMPLETED | OUTPATIENT
Start: 2023-09-01 | End: 2023-09-01

## 2023-09-01 RX ORDER — OXYBUTYNIN CHLORIDE 15 MG/1
30 TABLET, EXTENDED RELEASE ORAL DAILY
Qty: 60 TABLET | Refills: 1 | Status: SHIPPED | OUTPATIENT
Start: 2023-09-01

## 2023-09-01 RX ORDER — IOPAMIDOL 755 MG/ML
500 INJECTION, SOLUTION INTRAVASCULAR ONCE
Status: COMPLETED | OUTPATIENT
Start: 2023-09-01 | End: 2023-09-01

## 2023-09-01 RX ORDER — ALBUTEROL SULFATE 90 UG/1
AEROSOL, METERED RESPIRATORY (INHALATION)
Qty: 18 G | Refills: 1 | Status: SHIPPED | OUTPATIENT
Start: 2023-09-01

## 2023-09-01 RX ADMIN — IOPAMIDOL 77 ML: 755 INJECTION, SOLUTION INTRAVENOUS at 22:52

## 2023-09-01 RX ADMIN — KETOROLAC TROMETHAMINE 15 MG: 15 INJECTION, SOLUTION INTRAMUSCULAR; INTRAVENOUS at 22:10

## 2023-09-01 ASSESSMENT — PAIN SCALES - GENERAL: PAINLEVEL: MODERATE PAIN (5)

## 2023-09-01 ASSESSMENT — ACTIVITIES OF DAILY LIVING (ADL)
ADLS_ACUITY_SCORE: 37
ADLS_ACUITY_SCORE: 35

## 2023-09-01 NOTE — PROGRESS NOTES
Assessment and Plan:       ICD-10-CM    1. Chest pain, unspecified type  R07.9 EKG 12-lead, tracing only     D dimer, quantitative     Troponin T, High Sensitivity     XR Chest 2 Views      2. Mild intermittent asthma without complication  J45.20 albuterol (VENTOLIN HFA) 108 (90 Base) MCG/ACT inhaler      3. Boil  L02.92         Differentials for chest pain include acute coronary syndrome, PE, asthma exacerbation, COPD, emphysema, myofascial pain, anxiety.  EKG now showing right bundle branch block and left anterior fascicular block.  Will have cardiology review.  Chest x-ray shows no acute infiltrate or mass.  Will have radiology review.  Will obtain D-dimer to rule out PE and troponin to rule out acute coronary syndrome.  Further plans pending the results.  I encouraged him to use his albuterol inhaler.  Discussed smoking cessation options, but he declines.  Boil appears to be resolving.  This is not infected.  He is to apply warm compresses to assist with this.  If no improvement in symptoms, he is to follow-up with his PCP.  He is content with the plan.    Subjective:     Kimo is a 42 year old male presenting to the clinic for multiple concerns today.  Patient is present with a group home advocate.  Patient is concerned of a lump present within his left axilla for 2 weeks.  This is a red bump which is a size of a pea.  He has had some drainage from the area.  No fever has been present.  He has not tried any over-the-counter products for his symptoms.  Lump has decreased in size.  He is also concerned of left-sided chest pain and tightness.  This is intermittent which will last for few minutes.  He describes this as a dull ache.  Patient states this occurs when he smokes or vapes.  He does have some shortness of breath with activity.  He denies any recent cold symptoms or cough.  He has not been wheezing.  He has a prescription for an inhaler, but has not been using it.  He has an endoscopy scheduled for  "11/29/2023.  He has not traveled recently.  He denies any personal or family history of blood clots.  Patient had a Holter monitor performed on 5/8/2023 showing no rhythm disorder.  He has a history of a pericardial effusion.  Echocardiogram on 5/8/2023 showed resolution of this.  Ejection fraction was 60 to 65%.    Reviewof Systems: A complete 14 point review of systems was obtained and is negative or as stated in the history of present illness.    Social History     Socioeconomic History    Marital status: Single     Spouse name: Not on file    Number of children: Not on file    Years of education: Not on file    Highest education level: Not on file   Occupational History    Not on file   Tobacco Use    Smoking status: Every Day     Packs/day: 1.00     Years: 1.00     Pack years: 1.00     Types: Vaping Device, Cigarettes     Passive exposure: Current    Smokeless tobacco: Current     Types: Chew    Tobacco comments:     Smokes E Cigs equal to 1 PPD   Vaping Use    Vaping Use: Every day    Substances: Nicotine, Flavoring    Devices: Refillable tank   Substance and Sexual Activity    Alcohol use: Yes     Comment: socially--\"not very often\" \"once a month\"    Drug use: No     Comment: patient denies any marijuana use    Sexual activity: Yes     Partners: Female     Birth control/protection: Condom   Other Topics Concern    Parent/sibling w/ CABG, MI or angioplasty before 65F 55M? No   Social History Narrative    Not on file     Social Determinants of Health     Financial Resource Strain: Not on file   Food Insecurity: Not on file   Transportation Needs: Not on file   Physical Activity: Not on file   Stress: Not on file   Social Connections: Not on file   Intimate Partner Violence: Not At Risk (12/30/2022)    Humiliation, Afraid, Rape, and Kick questionnaire     Fear of Current or Ex-Partner: No     Emotionally Abused: No     Physically Abused: No     Sexually Abused: No   Housing Stability: Not on file       Active " Ambulatory Problems     Diagnosis Date Noted    Speech/language delay 11/11/2010    Tobacco use disorder 11/11/2010    Nocturnal enuresis     Moderate major depression (H) 01/15/2013    LOREN (obstructive sleep apnea) 04/23/2013    Essential hypertension 02/03/2014    Class 3 severe obesity with serious comorbidity and body mass index (BMI) of 50.0 to 59.9 in adult (H) 05/20/2014    Leukocytosis 06/09/2014    Suicidal ideation 08/23/2015    Chronic low back pain 10/06/2015    Bilateral low back pain with left-sided sciatica 10/08/2015    History of colonic polyps 09/28/2016    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    S/P total hip arthroplasty 01/25/2017    Vitamin D deficiency 03/20/2017    LPRD (laryngopharyngeal reflux disease) 03/20/2017    Patel's esophagus determined by biopsy 03/27/2017    Mild intermittent asthma 05/08/2017    Somatic dysfunction of lumbar region 10/13/2019    Somatic dysfunction of sacral region 10/13/2019    Sacral pain 10/13/2019    Thoracic segment dysfunction 10/13/2019    Hip pain, left 10/13/2019    Duodenitis 09/30/2020    Overactive bladder 11/06/2015    Venous stasis dermatitis of both lower extremities 08/27/2021    Severe recurrent major depressive disorder with psychotic features (H) 10/06/2021    Alcohol use disorder, mild, abuse 10/06/2021    Cannabis use disorder, mild, abuse 10/06/2021    Mood change 04/25/2022     Resolved Ambulatory Problems     Diagnosis Date Noted    Obesity 11/11/2010    Depression     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   Skin: Warm, dry.  He has a dime size circular area of erythema within his left axilla.  No fluctuation or induration noted.  HEENT:  Head normocephalic, atraumatic.  Eyes normal. Ears normal.  Nose patent, mucosa pink.  Oropharynx mucosa pink.  No lesions or tonsillar enlargement.   Neck: Supple, no lymphadenopathy.   Lungs:  Clear to auscultation. Respirations even and unlabored.  No wheezing or rales noted.   Heart:  Regular rate and rhythm.  No murmurs, S3, S4, gallops, or rubs.      LABORATORY: I ordered and personally reviewed an EKG showing sinus rhythm, right bundle branch block, left anterior fascicular block. Will have cardiology review.     I ordered and personally reviewed chest x-rays showing no obvious infiltrate or mass.  Will have radiology review.                    Answers submitted by the patient for this visit:  General Questionnaire (Submitted on 9/1/2023)  Chief Complaint: Chronic problems general questions HPI Form  What is the reason for your visit today? : lump under armpit  How many servings of fruits and vegetables do you eat daily?: 0-1  On average, how many sweetened beverages do you drink each day " (Examples: soda, juice, sweet tea, etc.  Do NOT count diet or artificially sweetened beverages)?: 3  How many minutes a day do you exercise enough to make your heart beat faster?: 9 or less  How many days a week do you exercise enough to make your heart beat faster?: 3 or less  How many days per week do you miss taking your medication?: 2  What makes it hard for you to take your medication every day?: remembering to take

## 2023-09-01 NOTE — TELEPHONE ENCOUNTER
Patient and care giver Jose Mike call, Patient gives verbal ok to speak with staff.   Requesting refill of Oxybutynin. Informed patient that this is filled through his urologist, they are asking if Dr. Love would be ok to fill for 2 months till he can be seen by urology.  Order pended, will forward to Dr. Love

## 2023-09-02 LAB
ATRIAL RATE - MUSE: 79 BPM
DIASTOLIC BLOOD PRESSURE - MUSE: NORMAL MMHG
INTERPRETATION ECG - MUSE: NORMAL
P AXIS - MUSE: 33 DEGREES
PR INTERVAL - MUSE: 166 MS
QRS DURATION - MUSE: 156 MS
QT - MUSE: 434 MS
QTC - MUSE: 497 MS
R AXIS - MUSE: -48 DEGREES
SYSTOLIC BLOOD PRESSURE - MUSE: NORMAL MMHG
T AXIS - MUSE: 24 DEGREES
VENTRICULAR RATE- MUSE: 79 BPM

## 2023-09-02 NOTE — ED TRIAGE NOTES
"Patient presents stating he was seen earlier in the clinic for a lump under his arm but they ran some tests and called him at home because his d-dimer was elevated.  Patient states he has been having \"pain in his lungs\" for a couple of days, pain is substernal and described as sharp at times but comes and goes.  No breathing difficulty or SOB.     Triage Assessment       Row Name 09/01/23 2052       Triage Assessment (Adult)    Airway WDL WDL       Respiratory WDL    Respiratory WDL WDL       Skin Circulation/Temperature WDL    Skin Circulation/Temperature WDL WDL       Cardiac WDL    Cardiac WDL X  chest pain       Peripheral/Neurovascular WDL    Peripheral Neurovascular WDL WDL       Cognitive/Neuro/Behavioral WDL    Cognitive/Neuro/Behavioral WDL WDL                    "

## 2023-09-02 NOTE — DISCHARGE INSTRUCTIONS
Stop smoking/vaping. This can be a cause of chest pain that is worse with breathing.     Follow-up with Dr. Love as scheduled to discuss any additional testing for elevated creatinine or chest pain.     Return to emergency department for worsening pain, fever > 100.4, worsening shortness of breath, or for any other concerns.     Discharge Instructions  Chest Pain    You have been seen today for chest pain or discomfort.  At this time, your provider has found no signs that your chest pain is due to a serious or life-threatening condition, (or you have declined more testing and/or admission to the hospital). However, sometimes there is a serious problem that does not show up right away. Your evaluation today may not be complete and you may need further testing and evaluation.     Generally, every Emergency Department visit should have a follow-up clinic visit with either a primary or a specialty clinic/provider. Please follow-up as instructed by your emergency provider today.  Return to the Emergency Department if:  Your chest pain changes, gets worse, starts to happen more often, or comes with less activity.  You are newly short of breath.  You get very weak or tired.  You pass out or faint.  You have any new symptoms, like fever, cough, numb legs, or you cough up blood.  You have anything else that worries you.    Until you follow-up with your regular provider, please do the following:  Take one aspirin daily unless you have an allergy or are told not to by your provider.  If a stress test appointment has been made, go to the appointment.  If you have questions, contact your regular provider.  Follow-up with your regular provider/clinic as directed; this is very important.    If you were given a prescription for medicine here today, be sure to read all of the information (including the package insert) that comes with your prescription.  This will include important information about the medicine, its side effects,  and any warnings that you need to know about.  The pharmacist who fills the prescription can provide more information and answer questions you may have about the medicine.  If you have questions or concerns that the pharmacist cannot address, please call or return to the Emergency Department.       Remember that you can always come back to the Emergency Department if you are not able to see your regular provider in the amount of time listed above, if you get any new symptoms, or if there is anything that worries you.

## 2023-09-02 NOTE — ED PROVIDER NOTES
History     Chief Complaint:  Abnormal Labs       The history is provided by the patient.      Kimo Greenwood is a 42 year old male who presents with an elevated d-dimer. The patient presented to his clinic today due to concern for axillary abscess and chest pain that began 5 days ago. He received a d-dimer that was elevated and was recommended to present to the ED. The patient states that his pain varies in location and worsens when breathing or moving positions. He notes that the pain is similar what he experienced when he had pneumonia. He also reports a cough. The patient denies vomiting, fever, chills, or myalgias. He also denies family history of PE. He has no allergies to medications.     Independent Historian:    No independent historian     Review of External Notes:  Reviewed note from TOPHER Ambrocio, earlier today where D-dimer was collected.     Medications:    Albuterol   Cymbalta   Hydrochlorothiazide   Myrbetriq   Lybalvi   Ditropan   Minipress   Protonix   Wegovy     Past Medical History:    Arthritis   Asthma   Patel's esophagus   GERD   Hypertension   Leukocytosis   Laryngopharyngeal reflux disease   Marijuana abuse   Depression   Mild intellectual disability   Alcohol use disorder   LOREN     Past Surgical History:    Arthroplasty x3   Bladder surgery   Reduction hip   Colonoscopy x3  EGD x4  Fulgurate condyloma anus    surgery, unspecified   MARGO   Stimulator   Cystourethroscopy   Implant gastric stimulator     Physical Exam   Patient Vitals for the past 24 hrs:   BP Temp Temp src Pulse Resp SpO2   09/01/23 2200 109/59 -- -- 75 -- 98 %   09/01/23 2155 108/62 -- -- 78 -- 97 %   09/01/23 2054 (!) 172/84 -- -- -- -- --   09/01/23 2050 -- 98.5  F (36.9  C) Temporal 81 24 97 %        Physical Exam  Nursing note and vitals reviewed.  HENT:   Mouth/Throat: Moist mucous membranes.   Eyes: EOMI, nonicteric sclera  Cardiovascular: Normal rate, regular rhythm, no murmurs, rubs, or  gallops  Pulmonary/Chest: Effort normal and breath sounds normal. No respiratory distress. No wheezes. No rales.   Abdominal: Soft. Nontender, nondistended, no guarding or rigidity.   Musculoskeletal: Normal range of motion.   Neurological: Alert. Moves all extremities spontaneously.   Skin: Skin is warm and dry. No rash noted.         Emergency Department Course   ECG  ECG results from 09/01/23   EKG 12 lead     Value    Systolic Blood Pressure     Diastolic Blood Pressure     Ventricular Rate 80    Atrial Rate 80    NE Interval 186    QRS Duration 154        QTc 493    P Axis 43    R AXIS -38    T Axis 22    Interpretation ECG      Sinus rhythm  Left axis deviation  Right bundle branch block  Abnormal ECG  When compared with ECG of 01-SEP-2023 09:27, (unconfirmed)  No significant change was found       *Note: Due to a large number of results and/or encounters for the requested time period, some results have not been displayed. A complete set of results can be found in Results Review.         Imaging:  CT Chest Pulmonary Embolism w Contrast   Final Result   IMPRESSION:   1.  No evidence of pulmonary embolus to the segmental level.        Report per radiology    Laboratory:  Labs Ordered and Resulted from Time of ED Arrival to Time of ED Departure   BASIC METABOLIC PANEL - Abnormal       Result Value    Sodium 134 (*)     Potassium 3.4      Chloride 97 (*)     Carbon Dioxide (CO2) 27      Anion Gap 10      Urea Nitrogen 18.1      Creatinine 1.25 (*)     Calcium 9.3      Glucose 104 (*)     GFR Estimate 74     CBC WITH PLATELETS AND DIFFERENTIAL - Abnormal    WBC Count 15.4 (*)     RBC Count 4.28 (*)     Hemoglobin 12.5 (*)     Hematocrit 37.0 (*)     MCV 86      MCH 29.2      MCHC 33.8      RDW 14.0      Platelet Count 258      % Neutrophils 76      % Lymphocytes 19      % Monocytes 4      % Eosinophils 1      % Basophils 0      % Immature Granulocytes 0      NRBCs per 100 WBC 0      Absolute Neutrophils 11.6  (*)     Absolute Lymphocytes 2.9      Absolute Monocytes 0.7      Absolute Eosinophils 0.1      Absolute Basophils 0.1      Absolute Immature Granulocytes 0.1      Absolute NRBCs 0.0     RBC AND PLATELET MORPHOLOGY - Abnormal    Platelet Assessment Platelets Clumped (*)     RBC Morphology Confirmed RBC Indices            Emergency Department Course & Assessments:         Interventions:  Medications   ketorolac (TORADOL) injection 15 mg (15 mg Intravenous $Given 9/1/23 2210)   sodium chloride (PF) 0.9% PF flush 100 mL (90 mLs Intravenous $Given 9/1/23 9773)   iopamidol (ISOVUE-370) solution 500 mL (77 mLs Intravenous $Given 9/1/23 2252)        Independent Interpretation (X-rays, CTs, rhythm strip):  Chest CT independently reviewed. No pneumonia/pneumothorax. No pericardial effusion.     Consultations/Discussion of Management or Tests:  None       Social Determinants of Health affecting care:  Healthcare Access/Compliance and Education/Literacy     Disposition:  The patient was discharged to home.     Impression & Plan      Medical Decision Making:  Pt presents from home after evaluation earlier today for chest pain returned an elevated d-dimer. He reports several days of chest pain that is worse with movement and also with breathing. EKG without evidence of ischemia. Troponin earlier today negative, which is sufficient to rule out ACS given duration of symptoms. Pt denies cough/hemoptysis. No history of PE. His vital signs do not suggest PE, and in fact he is PERC negative. Discussed with pt and parents that given this, PE is unlikely. However, we also discussed that a chest CT would also show alternate etiologies for chest pain such as pericardial effusion, subclinical pneumonia, etc. They still wanted to proceed. Results as above with no acute etiology. Incidental adenoma discussed, which pt/parents are aware of. Also discussed elevated serum creatinine which has previously been elevated. Pt has short-term  follow-up to discuss further with pcp. Pt encouraged to stop vaping as can certainly be contributing to pleuritic pain. No evidence of emergent etiology. Pt safe for discharge home at this time. Red flags that should merit ED return discussed. Pt discharged in stable condition. All questions answered.     Diagnosis:    ICD-10-CM    1. Nonspecific chest pain  R07.9       2. Adrenal adenoma, left  D35.02       3. Elevated serum creatinine  R79.89              Scribe Disclosure:  I, Kaela Claors, am serving as a scribe at 9:29 PM on 9/1/2023 to document services personally performed by Jean Marie Aj MD based on my observations and the provider's statements to me.                 Jean Marie Aj MD  09/03/23 0633

## 2023-09-05 LAB
ATRIAL RATE - MUSE: 80 BPM
DIASTOLIC BLOOD PRESSURE - MUSE: NORMAL MMHG
INTERPRETATION ECG - MUSE: NORMAL
P AXIS - MUSE: 43 DEGREES
PR INTERVAL - MUSE: 186 MS
QRS DURATION - MUSE: 154 MS
QT - MUSE: 428 MS
QTC - MUSE: 493 MS
R AXIS - MUSE: -38 DEGREES
SYSTOLIC BLOOD PRESSURE - MUSE: NORMAL MMHG
T AXIS - MUSE: 22 DEGREES
VENTRICULAR RATE- MUSE: 80 BPM

## 2023-09-06 ENCOUNTER — TELEPHONE (OUTPATIENT)
Dept: FAMILY MEDICINE | Facility: CLINIC | Age: 43
End: 2023-09-06
Payer: COMMERCIAL

## 2023-09-06 NOTE — TELEPHONE ENCOUNTER
Crys calling stating patient was in the Er recently and they advised to quit smoking and vaping.  Patient is wanting to quit smoking and vaping and they are wanting to know how to proceed.      They have an appointment on 9/12/23 for Er follow up    Call Crys or guardian with any questions.

## 2023-09-07 ENCOUNTER — OFFICE VISIT (OUTPATIENT)
Dept: ENDOCRINOLOGY | Facility: CLINIC | Age: 43
End: 2023-09-07
Payer: COMMERCIAL

## 2023-09-07 VITALS
HEIGHT: 71 IN | WEIGHT: 315 LBS | SYSTOLIC BLOOD PRESSURE: 123 MMHG | OXYGEN SATURATION: 98 % | HEART RATE: 88 BPM | DIASTOLIC BLOOD PRESSURE: 79 MMHG | BODY MASS INDEX: 44.1 KG/M2 | RESPIRATION RATE: 20 BRPM

## 2023-09-07 DIAGNOSIS — E66.813 CLASS 3 SEVERE OBESITY DUE TO EXCESS CALORIES WITH SERIOUS COMORBIDITY AND BODY MASS INDEX (BMI) OF 50.0 TO 59.9 IN ADULT (H): ICD-10-CM

## 2023-09-07 DIAGNOSIS — E66.01 CLASS 3 SEVERE OBESITY DUE TO EXCESS CALORIES WITH SERIOUS COMORBIDITY AND BODY MASS INDEX (BMI) OF 50.0 TO 59.9 IN ADULT (H): ICD-10-CM

## 2023-09-07 PROCEDURE — 99214 OFFICE O/P EST MOD 30 MIN: CPT

## 2023-09-07 ASSESSMENT — PAIN SCALES - GENERAL: PAINLEVEL: EXTREME PAIN (9)

## 2023-09-07 NOTE — TELEPHONE ENCOUNTER
Spoke with Belen and advised of message below. Will send [id number] with additional information/resources for cessation.    Judy ALEXANDER RN

## 2023-09-07 NOTE — LETTER
2023       RE: Kimo Greenwood   [address]  [address]  [place] [id number]     Dear Colleague,    Thank you for referring your patient, Kimo Greenwood, to the [facility] [place] [facility]. Please see a copy of my visit note below.      Return Medical Weight Management Note     Kimo Greenwood  MRN:  [id number]  :  1980  SALEEM:  2023    Dear Ciro Love MD,    I had the pleasure of seeing your patient Kimo Greenwood. He is a 42 year old male who I am continuing to see for treatment of obesity related to:        2023     1:43 PM   --   I have the following health issues associated with obesity None of the above       Assessment & Plan  Problem List Items Addressed This Visit          Digestive    Class 3 severe obesity with serious comorbidity and body mass index (BMI) of 50.0 to 59.9 in adult (H)      Continue Wegovy 1.7mg once weekly. Refills not needed.   Fluid goal - 30oz daily  Continue to work on decreasing back to 1 soda daily   Follow up with Nya Camarena in 2-3 months     INTERVAL HISTORY:  First seen for NBS - 2023  Continued with MWM, concerned for post op diet.   Weight loss required for hip replacement <330lbs  Transitioned to Wegovy 1.0mg   3/13/23 - continued Wegovy 1.0mg, goal of decreasing pop to 1-2 bottles daily   Reached out via [employer] 23 - increased Wegovy 1.7mg   23 - continued Wegovy 1.7mg         Is working on smoking cessation.     Anti-obesity medications:     Current:   Wegovy 1.7mg - has been on this dose for a couple of months. No side effects. Has not had any concerns with nausea and vomiting since last visit. Continues to have fatigue, but tolerated. Has been helpful with hunger and weight loss.       Recent diet changes: Eating 2 meals a day, minimal snacking. Has been protein focused - eating more fish. Increased vegetables. Was down to 1 pop a day,  but has increased to 2 -3 a day over the past 2 weeks when care worker was on vacation.     Recent exercise/activity changes: Wants to get into the water, but pool at apartment has been closed. Working to get PT referral through PCP.      Recent stressors: Some increase stress lately     Recent sleep changes: Stable         CURRENT WEIGHT:   374 lbs 14.4 oz    Initial Weight (lbs): 407 lbs  Last Visits Weight: 175.5 kg (387 lb)  Cumulative weight loss (lbs): 32.1  Weight Loss Percentage: 7.89%    Wt Readings from Last 5 Encounters:   09/07/23 (!) 170.1 kg (374 lb 14.4 oz)   09/01/23 (!) 170.8 kg (376 lb 9.6 oz)   06/20/23 (!) 174.7 kg (385 lb 3.2 oz)   06/19/23 (!) 174.6 kg (385 lb)   06/08/23 (!) 175.6 kg (387 lb 1.6 oz)             9/7/2023     1:58 PM   Changes and Difficulties   I have made the following changes to my diet since my last visit: none   With regards to my diet, I am still struggling with: pop   I have made the following changes to my activity/exercise since my last visit: none   With regards to my activity/exercise, I am still struggling with: tired and pain         MEDICATIONS:   Current Outpatient Medications   Medication Sig Dispense Refill    albuterol (VENTOLIN HFA) 108 (90 Base) MCG/ACT inhaler INHALE 2 PUFFS INTO LUNGS EVERY SIX HOURS AS NEEDED FOR SHORTNESS OF BREATH / DYSPNEA OR WHEEZING 18 g 1    calcium polycarbophil (FIBER-LAX) 625 MG tablet Take 2 tablets (1,250 mg) by mouth 2 times daily 360 tablet 0    DULoxetine (CYMBALTA) 60 MG capsule Take 120 mg by mouth daily       hydrochlorothiazide (HYDRODIURIL) 25 MG tablet Take 1 tablet (25 mg) by mouth daily 30 tablet 7    lisinopril (ZESTRIL) 10 MG tablet Take 1 tablet (10 mg) by mouth every morning 30 tablet 7    mirabegron (MYRBETRIQ) 50 MG 24 hr tablet Take 1 tablet by mouth daily      OLANZapine-samidorphan (LYBALVI) 10-10 MG TABS tablet Take 1 tablet by mouth At Bedtime      order for DME Equipment being ordered: CPAP supplies 1 each  "0    oxyBUTYnin ER (DITROPAN XL) 15 MG 24 hr tablet Take 2 tablets (30 mg) by mouth daily 60 tablet 1    pantoprazole (PROTONIX) 40 MG EC tablet TAKE 1 TABLET BY MOUTH ONCE DAILY 30-60 MINUTES BR FIRST MEAL OF THE DAY 30 tablet 7    prazosin (MINIPRESS) 1 MG capsule Take 3 mg by mouth At Bedtime      Semaglutide-Weight Management (WEGOVY) 1.7 MG/0.75ML pen Inject 1.7 mg Subcutaneous once a week 9 mL 1           9/7/2023     1:58 PM   Weight Loss Medication History Reviewed With Patient   Which weight loss medications are you currently taking on a regular basis? Wegovy   Are you having any side effects from the weight loss medication that we have prescribed you? No     PHYSICAL EXAM:  Objective   /79 (BP Location: Left arm, Patient Position: Sitting, Cuff Size: Adult Large)   Pulse 88   Resp 20   Ht 1.803 m (5' 11\")   Wt (!) 170.1 kg (374 lb 14.4 oz)   SpO2 98%   BMI 52.29 kg/m      GENERAL: Healthy, alert and no distress  EYES: Eyes grossly normal to inspection.  No discharge or erythema, or obvious scleral/conjunctival abnormalities.  RESP: No audible wheeze, cough, or visible cyanosis.  No visible retractions or increased work of breathing.    SKIN: Visible skin clear. No significant rash, abnormal pigmentation or lesions.  NEURO: Cranial nerves grossly intact.  Mentation and speech appropriate for age.  PSYCH: Mentation appears normal, affect normal/bright, judgement and insight intact, normal speech and appearance well-groomed.        Sincerely,    MONA ONOFRE PA-C      30 minutes spent by me on the date of the encounter doing chart review, history and exam, documentation and further activities per the note    "

## 2023-09-07 NOTE — PROGRESS NOTES
Return Medical Weight Management Note     Kimo Greenwood  MRN:  [id number]  :  1980  SALEEM:  2023    Dear Ciro Love MD,    I had the pleasure of seeing your patient Kimo Greenwood. He is a 42 year old male who I am continuing to see for treatment of obesity related to:        2023     1:43 PM   --   I have the following health issues associated with obesity None of the above       Assessment & Plan   Problem List Items Addressed This Visit          Digestive    Class 3 severe obesity with serious comorbidity and body mass index (BMI) of 50.0 to 59.9 in adult (H)      Continue Wegovy 1.7mg once weekly. Refills not needed.   Fluid goal - 30oz daily  Continue to work on decreasing back to 1 soda daily   Follow up with Nya Camarena in 2-3 months     INTERVAL HISTORY:  First seen for NBS - 2023  Continued with MWM, concerned for post op diet.   Weight loss required for hip replacement <330lbs  Transitioned to Wegovy 1.0mg   3/13/23 - continued Wegovy 1.0mg, goal of decreasing pop to 1-2 bottles daily   Reached out via [employer] - increased Wegovy 1.7mg   23 - continued Wegovy 1.7mg         Is working on smoking cessation.     Anti-obesity medications:     Current:   Wegovy 1.7mg - has been on this dose for a couple of months. No side effects. Has not had any concerns with nausea and vomiting since last visit. Continues to have fatigue, but tolerated. Has been helpful with hunger and weight loss.       Recent diet changes: Eating 2 meals a day, minimal snacking. Has been protein focused - eating more fish. Increased vegetables. Was down to 1 pop a day, but has increased to 2 -3 a day over the past 2 weeks when care worker was on vacation.     Recent exercise/activity changes: Wants to get into the water, but pool at apartment has been closed. Working to get PT referral through PCP.      Recent stressors: Some increase stress lately     Recent sleep changes: Stable         CURRENT  WEIGHT:   374 lbs 14.4 oz    Initial Weight (lbs): 407 lbs  Last Visits Weight: 175.5 kg (387 lb)  Cumulative weight loss (lbs): 32.1  Weight Loss Percentage: 7.89%    Wt Readings from Last 5 Encounters:   09/07/23 (!) 170.1 kg (374 lb 14.4 oz)   09/01/23 (!) 170.8 kg (376 lb 9.6 oz)   06/20/23 (!) 174.7 kg (385 lb 3.2 oz)   06/19/23 (!) 174.6 kg (385 lb)   06/08/23 (!) 175.6 kg (387 lb 1.6 oz)             9/7/2023     1:58 PM   Changes and Difficulties   I have made the following changes to my diet since my last visit: none   With regards to my diet, I am still struggling with: pop   I have made the following changes to my activity/exercise since my last visit: none   With regards to my activity/exercise, I am still struggling with: tired and pain         MEDICATIONS:   Current Outpatient Medications   Medication Sig Dispense Refill    albuterol (VENTOLIN HFA) 108 (90 Base) MCG/ACT inhaler INHALE 2 PUFFS INTO LUNGS EVERY SIX HOURS AS NEEDED FOR SHORTNESS OF BREATH / DYSPNEA OR WHEEZING 18 g 1    calcium polycarbophil (FIBER-LAX) 625 MG tablet Take 2 tablets (1,250 mg) by mouth 2 times daily 360 tablet 0    DULoxetine (CYMBALTA) 60 MG capsule Take 120 mg by mouth daily       hydrochlorothiazide (HYDRODIURIL) 25 MG tablet Take 1 tablet (25 mg) by mouth daily 30 tablet 7    lisinopril (ZESTRIL) 10 MG tablet Take 1 tablet (10 mg) by mouth every morning 30 tablet 7    mirabegron (MYRBETRIQ) 50 MG 24 hr tablet Take 1 tablet by mouth daily      OLANZapine-samidorphan (LYBALVI) 10-10 MG TABS tablet Take 1 tablet by mouth At Bedtime      order for DME Equipment being ordered: CPAP supplies 1 each 0    oxyBUTYnin ER (DITROPAN XL) 15 MG 24 hr tablet Take 2 tablets (30 mg) by mouth daily 60 tablet 1    pantoprazole (PROTONIX) 40 MG EC tablet TAKE 1 TABLET BY MOUTH ONCE DAILY 30-60 MINUTES BR FIRST MEAL OF THE DAY 30 tablet 7    prazosin (MINIPRESS) 1 MG capsule Take 3 mg by mouth At Bedtime      Semaglutide-Weight Management  "(WEGOVY) 1.7 MG/0.75ML pen Inject 1.7 mg Subcutaneous once a week 9 mL 1           9/7/2023     1:58 PM   Weight Loss Medication History Reviewed With Patient   Which weight loss medications are you currently taking on a regular basis? Wegovy   Are you having any side effects from the weight loss medication that we have prescribed you? No     PHYSICAL EXAM:  Objective    /79 (BP Location: Left arm, Patient Position: Sitting, Cuff Size: Adult Large)   Pulse 88   Resp 20   Ht 1.803 m (5' 11\")   Wt (!) 170.1 kg (374 lb 14.4 oz)   SpO2 98%   BMI 52.29 kg/m      GENERAL: Healthy, alert and no distress  EYES: Eyes grossly normal to inspection.  No discharge or erythema, or obvious scleral/conjunctival abnormalities.  RESP: No audible wheeze, cough, or visible cyanosis.  No visible retractions or increased work of breathing.    SKIN: Visible skin clear. No significant rash, abnormal pigmentation or lesions.  NEURO: Cranial nerves grossly intact.  Mentation and speech appropriate for age.  PSYCH: Mentation appears normal, affect normal/bright, judgement and insight intact, normal speech and appearance well-groomed.        Sincerely,    MONA ONOFRE PA-C      30 minutes spent by me on the date of the encounter doing chart review, history and exam, documentation and further activities per the note  "

## 2023-09-07 NOTE — NURSING NOTE
"(   Chief Complaint   Patient presents with    Follow Up    )    ( Weight: (!) 374 lb 14.4 oz )  ( Height: 5' 11\" )  ( BMI (Calculated): 52.29 )  (   )  (   )  (   )  (   )  (   )  (   )    ( BP: 123/79 )  (   )  (   )  (   )  ( Pulse: 88 )  ( Resp: 20 )  ( SpO2: 98 % )    (   Patient Active Problem List   Diagnosis    Speech/language delay    Tobacco use disorder    Nocturnal enuresis    Moderate major depression (H)    LOREN (obstructive sleep apnea)    Essential hypertension    Class 3 severe obesity with serious comorbidity and body mass index (BMI) of 50.0 to 59.9 in adult (H)    Leukocytosis    Suicidal ideation    Chronic low back pain    Bilateral low back pain with left-sided sciatica    History of colonic polyps    Mild intellectual disability    S/P total hip arthroplasty    Vitamin D deficiency    LPRD (laryngopharyngeal reflux disease)    Patel's esophagus determined by biopsy    Mild intermittent asthma    Somatic dysfunction of lumbar region    Somatic dysfunction of sacral region    Sacral pain    Thoracic segment dysfunction    Hip pain, left    Duodenitis    Overactive bladder    Venous stasis dermatitis of both lower extremities    Severe recurrent major depressive disorder with psychotic features (H)    Alcohol use disorder, mild, abuse    Cannabis use disorder, mild, abuse    Mood change    )  (   Current Outpatient Medications   Medication Sig Dispense Refill    albuterol (VENTOLIN HFA) 108 (90 Base) MCG/ACT inhaler INHALE 2 PUFFS INTO LUNGS EVERY SIX HOURS AS NEEDED FOR SHORTNESS OF BREATH / DYSPNEA OR WHEEZING 18 g 1    calcium polycarbophil (FIBER-LAX) 625 MG tablet Take 2 tablets (1,250 mg) by mouth 2 times daily 360 tablet 0    DULoxetine (CYMBALTA) 60 MG capsule Take 120 mg by mouth daily       hydrochlorothiazide (HYDRODIURIL) 25 MG tablet Take 1 tablet (25 mg) by mouth daily 30 tablet 7    lisinopril (ZESTRIL) 10 MG tablet Take 1 tablet (10 mg) by mouth every morning 30 tablet 7    " mirabegron (MYRBETRIQ) 50 MG 24 hr tablet Take 1 tablet by mouth daily      OLANZapine-samidorphan (LYBALVI) 10-10 MG TABS tablet Take 1 tablet by mouth At Bedtime      order for DME Equipment being ordered: CPAP supplies 1 each 0    oxyBUTYnin ER (DITROPAN XL) 15 MG 24 hr tablet Take 2 tablets (30 mg) by mouth daily 60 tablet 1    pantoprazole (PROTONIX) 40 MG EC tablet TAKE 1 TABLET BY MOUTH ONCE DAILY 30-60 MINUTES BR FIRST MEAL OF THE DAY 30 tablet 7    prazosin (MINIPRESS) 1 MG capsule Take 3 mg by mouth At Bedtime      Semaglutide-Weight Management (WEGOVY) 1.7 MG/0.75ML pen Inject 1.7 mg Subcutaneous once a week 9 mL 1    )  ( Diabetes Eval:    )    ( Pain Eval:  Extreme Pain (9) )    ( Wound Eval:       )    (   History   Smoking Status    Every Day    Packs/day: 1.00    Years: 1.00    Types: Vaping Device, Cigarettes   Smokeless Tobacco    Current    Types: Chew    )    ( Signed By:  Paco Hester, EMT; September 7, 2023; 2:06 PM )     See HPI

## 2023-09-11 NOTE — PATIENT INSTRUCTIONS
"Robert Lamar,   Thank you for allowing us the privilege of caring for you. We hope we provided you with the excellent service you deserve.   Please let us know if there is anything else we can do for you so that we can be sure you are completely satisfied with your care experience.    To ensure the quality of our services you may be receiving a patient satisfaction survey from an independent patient satisfaction monitoring company.    The greatest compliment you can give is a \"Likely to Recommend\"    Your visit was with MONA ONOFRE PA-C today.    Instructions per today's visit:     Continue Wegovy 1.7mg once weekly. Refills not needed.   Fluid goal - 30oz daily  Continue to work on decreasing back to 1 soda daily   Follow up with Mona Camarena in 2-3 months     ___________________________________________________________________________  Important contact and scheduling information:  Please call our contact center at [phone] to schedule your next appointments.  For any nursing questions or concerns call Niurka Haque LPN at [phone] or eDnise Hubbard RN at [phone]  Please call during clinic hours Monday through Friday 8:00a - 4:00p if you have questions or you can contact us via [employer] at anytime and we will reply during clinic hours.    Lab results will be communicated through My Chart or letter (if My Chart not used). Please call the clinic if you have not received communication after 1 week or if you have any questions.?  Clinic Fax: [phone]  __________________________________________________________________________    If labs were ordered today:    Please make an appointment to have them drawn at your convenience.     To schedule the Lab Appointment using E[employer]:  Select \"Schedule an Appointment\"  Select \"Lab Only\"  For \"A couple of questions\", select \"Other\"  For \"Which locations work for you?, select the location and set up the appointment    To schedule by phone call [phone] to schedule a lab " only appointment at any [facility] lab.  ___________________________________________________________________________  Work with A Health !  Virtual Sessions are Available through [facility] Weight Management Clinics    To learn more, call to schedule a free, Health  Q&A appointment: [phone]     What is Health Coaching?  Do you know what you are supposed to do, but you just aren't doing it?  Then, HEALTH COACHING may help you!   Get unstuck and move forward with the support of a professionally trained NBC-HWC (National Board-Certified Health and ) who uses evidence-based approaches to help you move forward with healthy lifestyle changes in the areas of weight loss, stress management and overall well-being.    Health Coaches help you identify goals that will work best for you. Health Coaches provide support and encouragement with overcoming barriers and help you to find inspiration and motivation to lead a healthy lifestyle.    Option one:  Health Coaching 3-Pack; Three, 30-minute Health Coaching Visits, for $99  Visits are done virtually (phone or video)  This is a self pay service; we do not accept insurance for mansi coaching.    Option two:   The 24 week Plan; 11 Health Coaching Visits, and a 7 months subscription to [employer]-- on-demand fitness, nutrition and mindfulness classes, for $499 (employee discounts may be available). Participants will also meet regularly with a weight management Medical Provider and a Registered/Licensed Dietician.  This is a self-pay service; we do not accept insurance for health coaching.    To Schedule a free Health  Q&A appointment to learn more,  call [phone].  ____________________________________________________________________    [facility]   Healthy Lifestyle Virtual Support Group    Healthy Lifestyle Virtual Support Group?  This is 60 minutes of small group guided discussion,  support and resources. All are welcome who want a healthy lifestyle.  WHEN: Starting in July 2023, this group meets the 1st Friday of the month from 12:30 PM - 1:30 PM virtually using Microsoft Teams.    FACILITATOR: Led by National Board Certified Health and , Jacklyn Benedict [facility].   TO REGISTER: Please send an email to Jacklyn [email or web address] to receive monthly invites to the group or if you have any questions about having a health .  Prior to the meeting, a link with directions on how to join the meeting will be sent to you.    2023 Meetings  May 19: Let's Talk  June 9: Create Your Coaching Toolkit: Learn How to  Yourself  July 7: Let's Talk  August 4: Benefits of Fiber with BENSON Deleon  September 1: Show and Tell (share your aps, podcasts, recipes, hacks, books)  October 6 :Let's Talk  November 3: Introduction to Mindfulness   December 1: Let's Talk    If you would like bariatric surgery specific support group info please let your care team know.         Thank you,   [facility] Comprehensive Weight Management Team

## 2023-09-12 ENCOUNTER — TELEPHONE (OUTPATIENT)
Dept: FAMILY MEDICINE | Facility: CLINIC | Age: 43
End: 2023-09-12

## 2023-09-12 ENCOUNTER — OFFICE VISIT (OUTPATIENT)
Dept: FAMILY MEDICINE | Facility: CLINIC | Age: 43
End: 2023-09-12
Payer: COMMERCIAL

## 2023-09-12 VITALS
DIASTOLIC BLOOD PRESSURE: 83 MMHG | HEIGHT: 71 IN | WEIGHT: 315 LBS | OXYGEN SATURATION: 99 % | RESPIRATION RATE: 20 BRPM | HEART RATE: 82 BPM | BODY MASS INDEX: 44.1 KG/M2 | TEMPERATURE: 98.6 F | SYSTOLIC BLOOD PRESSURE: 136 MMHG

## 2023-09-12 DIAGNOSIS — M25.562 ARTHRALGIA OF BOTH KNEES: ICD-10-CM

## 2023-09-12 DIAGNOSIS — F17.200 NICOTINE USE DISORDER: ICD-10-CM

## 2023-09-12 DIAGNOSIS — D17.79 MYELOLIPOMA OF LEFT ADRENAL GLAND: ICD-10-CM

## 2023-09-12 DIAGNOSIS — M25.561 ARTHRALGIA OF BOTH KNEES: ICD-10-CM

## 2023-09-12 DIAGNOSIS — R07.89 ATYPICAL CHEST PAIN: Primary | ICD-10-CM

## 2023-09-12 DIAGNOSIS — M16.11 ARTHRITIS OF RIGHT HIP: ICD-10-CM

## 2023-09-12 PROCEDURE — 90686 IIV4 VACC NO PRSV 0.5 ML IM: CPT | Performed by: FAMILY MEDICINE

## 2023-09-12 PROCEDURE — 90471 IMMUNIZATION ADMIN: CPT | Performed by: FAMILY MEDICINE

## 2023-09-12 PROCEDURE — 99215 OFFICE O/P EST HI 40 MIN: CPT | Mod: 25 | Performed by: FAMILY MEDICINE

## 2023-09-12 RX ORDER — MELOXICAM 15 MG/1
15 TABLET ORAL DAILY
Qty: 30 TABLET | Refills: 11 | Status: SHIPPED | OUTPATIENT
Start: 2023-09-12 | End: 2024-02-27

## 2023-09-12 RX ORDER — VARENICLINE TARTRATE 0.5 (11)-1
KIT ORAL
Qty: 53 TABLET | Refills: 0 | Status: SHIPPED | OUTPATIENT
Start: 2023-09-12 | End: 2024-02-27

## 2023-09-12 ASSESSMENT — PATIENT HEALTH QUESTIONNAIRE - PHQ9
10. IF YOU CHECKED OFF ANY PROBLEMS, HOW DIFFICULT HAVE THESE PROBLEMS MADE IT FOR YOU TO DO YOUR WORK, TAKE CARE OF THINGS AT HOME, OR GET ALONG WITH OTHER PEOPLE: NOT DIFFICULT AT ALL
SUM OF ALL RESPONSES TO PHQ QUESTIONS 1-9: 5
SUM OF ALL RESPONSES TO PHQ QUESTIONS 1-9: 5

## 2023-09-12 ASSESSMENT — ENCOUNTER SYMPTOMS
ARTHRALGIAS: 1
COUGH: 0
UNEXPECTED WEIGHT CHANGE: 0
FEVER: 0
SHORTNESS OF BREATH: 0

## 2023-09-12 ASSESSMENT — ANXIETY QUESTIONNAIRES
IF YOU CHECKED OFF ANY PROBLEMS ON THIS QUESTIONNAIRE, HOW DIFFICULT HAVE THESE PROBLEMS MADE IT FOR YOU TO DO YOUR WORK, TAKE CARE OF THINGS AT HOME, OR GET ALONG WITH OTHER PEOPLE: NOT DIFFICULT AT ALL
4. TROUBLE RELAXING: NOT AT ALL
5. BEING SO RESTLESS THAT IT IS HARD TO SIT STILL: NOT AT ALL
GAD7 TOTAL SCORE: 1
GAD7 TOTAL SCORE: 1
6. BECOMING EASILY ANNOYED OR IRRITABLE: SEVERAL DAYS
1. FEELING NERVOUS, ANXIOUS, OR ON EDGE: NOT AT ALL
7. FEELING AFRAID AS IF SOMETHING AWFUL MIGHT HAPPEN: NOT AT ALL
3. WORRYING TOO MUCH ABOUT DIFFERENT THINGS: NOT AT ALL
2. NOT BEING ABLE TO STOP OR CONTROL WORRYING: NOT AT ALL

## 2023-09-12 NOTE — PROGRESS NOTES
Assessment & Plan     Atypical chest pain  Resolved, possibly costochondritis, records from the ER and outpatient visit reviewed, positive D-dimer with a subsequent negative CT PE protocol.  We will continue to monitor.    Arthralgia of both knees  Start meloxicam, home physical therapy, monitor kidney functions on meloxicam for treatment of arthralgia.  - meloxicam (MOBIC) 15 MG tablet  Dispense: 30 tablet; Refill: 11  - Home Care Referral    Arthritis of right hip  Treatment as per above.  - meloxicam (MOBIC) 15 MG tablet  Dispense: 30 tablet; Refill: 11  - Home Care Referral    Myelolipoma of left adrenal gland  Child had a known suspected myolipoma of the left adrenal gland which was previously demonstrated on CT abdomen in 2022.  This was again reseen on recent CT scan, he has had some minor decrease in his kidney function, would recommend that he has an ultrasound of his left kidney to ensure there is not a obvious mass effect causing kidney function changes.  He is currently at CKD stage II, there is been slow decrease over the years which may be related to a both age, chronic NSAID use.  - US Kidney Left    Nicotine use disorder  Commend stopping vaping. Informed the association of bronchiolitis obliterans with vape use.   Start Chantix, Additionally use Nicotrol for breakthrough cravings.  - varenicline (CHANTIX WENDI) 0.5 MG X 11 & 1 MG X 42 tablet  Dispense: 53 tablet; Refill: 0  - nicotine (NICOTROL) 10 MG inhaler  Dispense: 6 each; Refill: 3  Post Medication Reconciliation Status:  Discharge medications reconciled and changed, see notes/orders      Ciro Love MD  [facility]    40 minutes spent face-to-face with direct patient care.    Carline Lamar is a 42 year old, presenting for the following health issues:  ER F/U (Was seen at [facility] on 09/01/2023 for chest pain), Results (Review abnormal lab results), and Smoking Cessation (Would like discuss smoking  "cessation options)        9/12/2023     7:22 AM   Additional Questions   Roomed by Kaitlin Berman       [facility]     ED/UC Followup:    Facility:  [facility]  Date of visit: 09/01/2023  Reason for visit: chest pain  Current Status: has improved but would like to discuss abnormal lab results    Patient is a 42-year-old male presents for ER follow-up for chest pain, they have additional medical concerns including change with his kidney function, incidental finding on his left adrenal, pain management for joint aches, nicotine cessation.          Review of Systems   Constitutional:  Negative for fever and unexpected weight change.   Respiratory:  Negative for cough and shortness of breath.    Cardiovascular:  Negative for chest pain.   Musculoskeletal:  Positive for arthralgias.            Objective    /83 (BP Location: Right arm, Patient Position: Chair, Cuff Size: Adult Large)   Pulse 82   Temp 98.6  F (37  C) (Oral)   Resp 20   Ht 1.803 m (5' 11\")   Wt (!) 172.4 kg (380 lb)   SpO2 99%   BMI 53.00 kg/m    Body mass index is 53 kg/m .  Physical Exam  Vitals reviewed.   Constitutional:       Appearance: He is not ill-appearing.   Cardiovascular:      Rate and Rhythm: Normal rate and regular rhythm.   Pulmonary:      Effort: Pulmonary effort is normal.      Breath sounds: Normal breath sounds.   Psychiatric:         Mood and Affect: Mood normal.                        CBC RESULTS:   Recent Labs   Lab Test 09/01/23  2231   WBC 15.4*   RBC 4.28*   HGB 12.5*   HCT 37.0*   MCV 86   MCH 29.2   MCHC 33.8   RDW 14.0        Last Comprehensive Metabolic Panel:  Lab Results   Component Value Date     (L) 09/01/2023    POTASSIUM 3.4 09/01/2023    CHLORIDE 97 (L) 09/01/2023    CO2 27 09/01/2023    ANIONGAP 10 09/01/2023     (H) 09/01/2023    BUN 18.1 09/01/2023    CR 1.25 (H) 09/01/2023    GFRESTIMATED 74 09/01/2023    FILEMON 9.3 09/01/2023 9/1/2023. CT: \"UPPER ABDOMEN: A large left adrenal " "myolipoma is again seen, similar to prior exam \"      Answers submitted by the patient for this visit:  Patient Health Questionnaire (Submitted on 9/12/2023)  If you checked off any problems, how difficult have these problems made it for you to do your work, take care of things at home, or get along with other people?: Not difficult at all  PHQ9 TOTAL SCORE: 5  ANDRES-7 (Submitted on 9/12/2023)  ANDRES 7 TOTAL SCORE: 1    "

## 2023-09-12 NOTE — TELEPHONE ENCOUNTER
[facility] calling.     Looking for quantity increase on nicotine in inhaler sent.      Disp Refills Start End HARMEET   nicotine (NICOTROL) 10 MG inhaler 6 each           Quantity is 6    Box has 168 they can't open a pack to dispense only 6 so can you change to one box or further advise. I have pended your order below please review for change.     Traci Acosta R.N.

## 2023-09-22 DIAGNOSIS — E66.01 CLASS 3 SEVERE OBESITY DUE TO EXCESS CALORIES WITH SERIOUS COMORBIDITY AND BODY MASS INDEX (BMI) OF 50.0 TO 59.9 IN ADULT (H): ICD-10-CM

## 2023-09-22 DIAGNOSIS — E66.813 CLASS 3 SEVERE OBESITY DUE TO EXCESS CALORIES WITH SERIOUS COMORBIDITY AND BODY MASS INDEX (BMI) OF 50.0 TO 59.9 IN ADULT (H): ICD-10-CM

## 2023-09-27 ENCOUNTER — APPOINTMENT (OUTPATIENT)
Dept: URBAN - METROPOLITAN AREA CLINIC 253 | Age: 43
Setting detail: DERMATOLOGY
End: 2023-09-27

## 2023-09-27 ENCOUNTER — HOSPITAL ENCOUNTER (OUTPATIENT)
Dept: ULTRASOUND IMAGING | Facility: CLINIC | Age: 43
Discharge: HOME | End: 2023-09-27
Attending: FAMILY MEDICINE
Payer: COMMERCIAL

## 2023-09-27 ENCOUNTER — HOSPITAL ENCOUNTER (OUTPATIENT)
Dept: GENERAL RADIOLOGY | Facility: CLINIC | Age: 43
Discharge: HOME | End: 2023-09-27
Attending: ORTHOPAEDIC SURGERY
Payer: COMMERCIAL

## 2023-09-27 VITALS — WEIGHT: 315 LBS | HEIGHT: 72 IN | RESPIRATION RATE: 14 BRPM

## 2023-09-27 VITALS — OXYGEN SATURATION: 97 % | HEART RATE: 77 BPM | DIASTOLIC BLOOD PRESSURE: 68 MMHG | SYSTOLIC BLOOD PRESSURE: 109 MMHG

## 2023-09-27 DIAGNOSIS — L91.8 OTHER HYPERTROPHIC DISORDERS OF THE SKIN: ICD-10-CM

## 2023-09-27 DIAGNOSIS — L20.89 OTHER ATOPIC DERMATITIS: ICD-10-CM

## 2023-09-27 DIAGNOSIS — D22 MELANOCYTIC NEVI: ICD-10-CM

## 2023-09-27 DIAGNOSIS — D18.0 HEMANGIOMA: ICD-10-CM

## 2023-09-27 DIAGNOSIS — M25.559 JOINT PAIN, HIP: ICD-10-CM

## 2023-09-27 DIAGNOSIS — Z71.89 OTHER SPECIFIED COUNSELING: ICD-10-CM

## 2023-09-27 DIAGNOSIS — L72.8 OTHER FOLLICULAR CYSTS OF THE SKIN AND SUBCUTANEOUS TISSUE: ICD-10-CM

## 2023-09-27 DIAGNOSIS — D17.79 MYELOLIPOMA OF LEFT ADRENAL GLAND: ICD-10-CM

## 2023-09-27 DIAGNOSIS — L82.1 OTHER SEBORRHEIC KERATOSIS: ICD-10-CM

## 2023-09-27 PROBLEM — D22.5 MELANOCYTIC NEVI OF TRUNK: Status: ACTIVE | Noted: 2023-09-27

## 2023-09-27 PROBLEM — D18.01 HEMANGIOMA OF SKIN AND SUBCUTANEOUS TISSUE: Status: ACTIVE | Noted: 2023-09-27

## 2023-09-27 PROCEDURE — 76775 US EXAM ABDO BACK WALL LIM: CPT

## 2023-09-27 PROCEDURE — 250N000009 HC RX 250: Performed by: PHYSICIAN ASSISTANT

## 2023-09-27 PROCEDURE — 99213 OFFICE O/P EST LOW 20 MIN: CPT

## 2023-09-27 PROCEDURE — OTHER MIPS QUALITY: OTHER

## 2023-09-27 PROCEDURE — OTHER COUNSELING: OTHER

## 2023-09-27 PROCEDURE — 20610 DRAIN/INJ JOINT/BURSA W/O US: CPT | Mod: RT

## 2023-09-27 PROCEDURE — 255N000002 HC RX 255 OP 636: Mod: JZ | Performed by: ORTHOPAEDIC SURGERY

## 2023-09-27 PROCEDURE — 250N000011 HC RX IP 250 OP 636: Performed by: PHYSICIAN ASSISTANT

## 2023-09-27 RX ORDER — BUPIVACAINE HYDROCHLORIDE 5 MG/ML
5 INJECTION, SOLUTION EPIDURAL; INTRACAUDAL ONCE
Status: COMPLETED | OUTPATIENT
Start: 2023-09-27 | End: 2023-09-27

## 2023-09-27 RX ORDER — LIDOCAINE HYDROCHLORIDE 10 MG/ML
INJECTION, SOLUTION EPIDURAL; INFILTRATION; INTRACAUDAL; PERINEURAL
Status: DISCONTINUED
Start: 2023-09-27 | End: 2023-09-28 | Stop reason: HOSPADM

## 2023-09-27 RX ORDER — TRIAMCINOLONE ACETONIDE 40 MG/ML
INJECTION, SUSPENSION INTRA-ARTICULAR; INTRAMUSCULAR
Status: DISCONTINUED
Start: 2023-09-27 | End: 2023-09-28 | Stop reason: HOSPADM

## 2023-09-27 RX ORDER — BUPIVACAINE HYDROCHLORIDE 5 MG/ML
INJECTION, SOLUTION EPIDURAL; INTRACAUDAL
Status: DISCONTINUED
Start: 2023-09-27 | End: 2023-09-28 | Stop reason: HOSPADM

## 2023-09-27 RX ORDER — LIDOCAINE HYDROCHLORIDE 10 MG/ML
5 INJECTION, SOLUTION EPIDURAL; INFILTRATION; INTRACAUDAL; PERINEURAL ONCE
Status: COMPLETED | OUTPATIENT
Start: 2023-09-27 | End: 2023-09-27

## 2023-09-27 RX ORDER — TRIAMCINOLONE ACETONIDE 40 MG/ML
40 INJECTION, SUSPENSION INTRA-ARTICULAR; INTRAMUSCULAR ONCE
Status: COMPLETED | OUTPATIENT
Start: 2023-09-27 | End: 2023-09-27

## 2023-09-27 RX ORDER — IOPAMIDOL 408 MG/ML
10 INJECTION, SOLUTION INTRATHECAL ONCE
Status: COMPLETED | OUTPATIENT
Start: 2023-09-27 | End: 2023-09-27

## 2023-09-27 RX ADMIN — BUPIVACAINE HYDROCHLORIDE 15 MG: 5 INJECTION, SOLUTION EPIDURAL; INTRACAUDAL at 12:43

## 2023-09-27 RX ADMIN — TRIAMCINOLONE ACETONIDE 40 MG: 40 INJECTION, SUSPENSION INTRA-ARTICULAR; INTRAMUSCULAR at 12:43

## 2023-09-27 RX ADMIN — LIDOCAINE HYDROCHLORIDE 4 ML: 10 INJECTION, SOLUTION EPIDURAL; INFILTRATION; INTRACAUDAL; PERINEURAL at 12:42

## 2023-09-27 RX ADMIN — IOPAMIDOL 1 ML: 408 INJECTION, SOLUTION INTRATHECAL at 12:43

## 2023-09-27 ASSESSMENT — LOCATION DETAILED DESCRIPTION DERM
LOCATION DETAILED: RIGHT SUPERIOR ANTERIOR NECK
LOCATION DETAILED: RIGHT SUPERIOR LATERAL UPPER BACK
LOCATION DETAILED: RIGHT MID-UPPER BACK
LOCATION DETAILED: LEFT SUPERIOR MEDIAL UPPER BACK
LOCATION DETAILED: LEFT DORSAL WRIST
LOCATION DETAILED: RIGHT INFERIOR UPPER BACK

## 2023-09-27 ASSESSMENT — LOCATION ZONE DERM
LOCATION ZONE: TRUNK
LOCATION ZONE: ARM
LOCATION ZONE: NECK

## 2023-09-27 ASSESSMENT — LOCATION SIMPLE DESCRIPTION DERM
LOCATION SIMPLE: RIGHT ANTERIOR NECK
LOCATION SIMPLE: LEFT WRIST
LOCATION SIMPLE: LEFT UPPER BACK
LOCATION SIMPLE: RIGHT UPPER BACK

## 2023-09-27 NOTE — PROGRESS NOTES
Pt was in Radiology today for a right intraarticular hip steroid injection . Pt tolerated ortho procedure well. Pre procedure pain was 8/10 post procedure pain level 0.Procedure was completed by TERRANCE EDWARDS. There were no complications during this procedure. Pt verbalized understanding of written and verbal instructions and left department in stable and satisfactory condition with family. There is no evidence of bleeding or any other complications upon discharge.

## 2023-09-27 NOTE — PROGRESS NOTES
RADIOLOGY PROCEDURE NOTE  Patient name: Kimo Greenwood  MRN: [id number]  : 1980    Pre-procedure diagnosis: Right hip pain  Post-procedure diagnosis: Same    Procedure Date/Time: 2023  12:33 PM  Procedure: Right hip steroid injection  Estimated blood loss: None  Specimen(s) collected with description: none  The patient tolerated the procedure well with no immediate complications.  Significant findings:none    See imaging dictation for procedural details.    Provider name: Alberto Rodriguez PA-C  Assistant(s):None

## 2023-10-10 ENCOUNTER — OFFICE VISIT (OUTPATIENT)
Dept: FAMILY MEDICINE | Facility: CLINIC | Age: 43
End: 2023-10-10
Payer: COMMERCIAL

## 2023-10-10 VITALS
DIASTOLIC BLOOD PRESSURE: 82 MMHG | TEMPERATURE: 98 F | HEIGHT: 71 IN | HEART RATE: 82 BPM | WEIGHT: 315 LBS | RESPIRATION RATE: 22 BRPM | OXYGEN SATURATION: 97 % | BODY MASS INDEX: 44.1 KG/M2 | SYSTOLIC BLOOD PRESSURE: 128 MMHG

## 2023-10-10 DIAGNOSIS — L03.116 CELLULITIS OF LEFT LOWER EXTREMITY: Primary | ICD-10-CM

## 2023-10-10 PROCEDURE — 99213 OFFICE O/P EST LOW 20 MIN: CPT | Performed by: NURSE PRACTITIONER

## 2023-10-10 RX ORDER — SULFAMETHOXAZOLE/TRIMETHOPRIM 800-160 MG
1 TABLET ORAL 2 TIMES DAILY
Qty: 14 TABLET | Refills: 0 | Status: SHIPPED | OUTPATIENT
Start: 2023-10-10 | End: 2023-10-17

## 2023-10-10 NOTE — COMMUNITY RESOURCES LIST (ENGLISH)
10/10/2023   [facility] Lazarus Effect  N/A  For questions about this resource list or additional care needs, please contact your primary care clinic or care manager.  Phone: [phone]   Email: N/A   Address: [address] [place] [id number]   Hours: N/A        Financial Stability       Utility payment assistance  1  [employer] - Emergency Assistance [place] Distance: 1.64 miles      In-Person, Phone/Virtual   [address] [place] [address]  Language: English, Bulgarian  Hours: Mon - Fri 9:00 AM - 4:00 PM  Fees: Free   Phone: (253) 634-6533 Email: [email or web address] Website: [email or web address]     2  [facility] Service [facility] Distance: 4.04 miles      Phone/Virtual   [address] [place] [address]  Language: English, Hmong, Sudanese, Bulgarian  Hours: Mon - Fri 10:00 AM - 3:00 PM  Fees: Free   Phone: (568) 853-4366 Email: [email or web address] Website: [email or web address]          Important Numbers & Websites       Emergency Services   911  City Services   311  Poison Control   (956) 488-5921  Suicide Prevention Lifeline   (403) 749-2117 (TALK)  Child Abuse Hotline   (886) 953-9234 (4-A-Child)  Sexual Assault Hotline   (278) 308-7347 (HOPE)  National Runaway Safeline   (396) 775-4977 (RUNAWAY)  All-Options Talkline   (595) 366-1756  Substance Abuse Referral   (612) 672-5796 (HELP)

## 2023-10-10 NOTE — PROGRESS NOTES
Assessment & Plan       Cellulitis of left lower extremity  Start on antibiotics; will use bactrim due to concern for possible pus like drainage at home.  Monitor for worsening symptoms and follow-up for any worsening or if fails to resolve.    - sulfamethoxazole-trimethoprim (BACTRIM DS) 800-160 MG tablet; Take 1 tablet by mouth 2 times daily for 7 days             Crystal Louis, LAMBERT CNP  [facility] BERENICE Lamar is a 42 year old, presenting for the following health issues:  Mass (On knee)        10/10/2023    10:21 AM   Additional Questions   Roomed by Prachi Roberts CMA         10/10/2023    10:21 AM   Patient Reported Additional Medications   Patient reports taking the following new medications none       History of Present Illness       Reason for visit:  Infection on knee    He eats 0-1 servings of fruits and vegetables daily.He consumes 3 sweetened beverage(s) daily.He exercises with enough effort to increase his heart rate 9 or less minutes per day.  He exercises with enough effort to increase his heart rate 3 or less days per week.   He is taking medications regularly.         Concern - infection on LEFT knee  Onset: one week   Description: red, swollen   Intensity: mild  Progression of Symptoms:  worsening  Accompanying Signs & Symptoms: denies fevers, chills, sweats  Previous history of similar problem: no  Precipitating factors:        Worsened by: picking at it  Alleviating factors:        Improved by: none  Therapies tried and outcome: none    Started with what he thought was a pimple.  Was able to excrete what looked to be a little bit of pus out.   Valerio a line around the redness a few days ago.  Now redness spreading out from the line.   No knee pain.         Review of Systems   Constitutional, HEENT, cardiovascular, pulmonary, gi and gu systems are negative, except as otherwise noted.      Objective    /82 (BP Location: Right arm, Patient Position: Sitting,  "Cuff Size: Adult Large)   Pulse 82   Temp 98  F (36.7  C) (Tympanic)   Resp 22   Ht 1.803 m (5' 11\")   Wt (!) 172.4 kg (380 lb)   SpO2 97%   BMI 53.00 kg/m    Body mass index is 53 kg/m .  Physical Exam   GENERAL: healthy, alert and no distress  MS: no gross musculoskeletal defects noted, no edema  SKIN: L knee: scab with surrounding redness, subtle warmth, no drainage, +induration, no fluctuation       No results found. However, due to the size of the patient record, not all encounters were searched. Please check Results Review for a complete set of results.                  "

## 2023-10-11 ENCOUNTER — OFFICE VISIT (OUTPATIENT)
Dept: SLEEP MEDICINE | Facility: CLINIC | Age: 43
End: 2023-10-11
Payer: COMMERCIAL

## 2023-10-11 ENCOUNTER — TELEPHONE (OUTPATIENT)
Dept: ENDOCRINOLOGY | Facility: CLINIC | Age: 43
End: 2023-10-11

## 2023-10-11 VITALS
BODY MASS INDEX: 49.94 KG/M2 | SYSTOLIC BLOOD PRESSURE: 124 MMHG | WEIGHT: 315 LBS | DIASTOLIC BLOOD PRESSURE: 75 MMHG | OXYGEN SATURATION: 97 % | HEART RATE: 83 BPM

## 2023-10-11 DIAGNOSIS — G47.33 OSA (OBSTRUCTIVE SLEEP APNEA): Primary | ICD-10-CM

## 2023-10-11 PROCEDURE — 99214 OFFICE O/P EST MOD 30 MIN: CPT | Performed by: PHYSICIAN ASSISTANT

## 2023-10-11 NOTE — PATIENT INSTRUCTIONS
Your sleep apnea treatment may be affected by device recall    Our records show that you may have a Abby Respironics CPAP for the treatment of sleep apnea. Many of these devices have been recalled* by the  for replacement. [facility] recommends:     1) If you are using a Resmed device, continue using the device.  2) If you have a Abby Respironics device, register your device for confirmation of type of device and repair of the device at [email or web address]-update -if you cannot use link, call [phone].  The website will assist you in obtaining the serial number for registration.   3) If you are using a Abby Respironics CPAP or Bilevel PAP device and you do not have immediate breathing, driving or cardiovascular risks without the device, consider stopping use of the device after verification that is has been recalled. Discuss this decision with your medical provider if you are uncertain about your medical risks.  4) If you are not using Respironics CPAP but are using a Respironics advanced device for breathing support (AVAPS, ASV, Bilevel PAP), continue using the device and review 5 and 6 below).     5) If you continue the device, do not include ozone generating  connected to PAP devices.  6) Bacterial filters to reduce exposure to particulates are sometimes cumbersome to use and are not currently recommended by the .    ?       You may also choose discuss with your provider alternative approaches to treatment.      *[employer] is voluntarily recalling the below devices due to two (2) issues related to the polyester-based polyurethane (PE-PUR) sound abatement foam used in Abby Continuous and Non-Continuous Ventilators: 1) PE-PUR foam may degrade into particles which may enter the device's the air pathway and be ingested or inhaled by the user, and 2) the PE-PUR foam may off-gas certain chemicals. The  foam degradation may be exacerbated by use of unapproved cleaning methods, such as ozone (see FDA safety communication on use of Ozone ), and off-gassing may occur during initial operation and may possibly continue throughout the device's useful life.   These issues can result in serious injury which can be life-threatening, cause permanent impairment, and/or require medical intervention to preclude permanent impairment. To date, [employer] has received several complaints regarding the presence of black debris/particles within the airpath circuit (extending from the device outlet, humidifier, tubing, and mask). Abby also has received reports of headache, upper airway irritation, cough, chest pressure and sinus infection. The potential risks of particulate exposure include: Irritation (skin, eye, and respiratory tract), inflammatory response, headache, asthma, adverse effects to other organs (e.g. kidneys and liver) and toxic carcinogenic affects. The potential risks of chemical exposure due to off-gassing include: headache/dizziness, irritation (eyes, nose, respiratory tract, skin), hypersensitivity, nausea/vomiting, toxic and carcinogenic effects. There have been no reports of death as a result of these issues.    Actions to be taken:  Discontinue the use of your device.  Do not continue to use ozone  with the device.     [place] affected devices on the recall website, www.[employer].com/SRC-update    i. The website provides current information on the status of the recall and how  to receive permanent corrective action to address the two issues.    ii. The website also provides instructions on how to locate an affected device  Serial Number and will guide users through the registration process.    iii. In the , call [phone] Service Hotline if you cannot visit the website

## 2023-10-11 NOTE — TELEPHONE ENCOUNTER
General Call      Reason for Call:  Wegovy    What are your questions or concerns:  pt would like an increase in the Wegovy dose    Jose, [phone]

## 2023-10-11 NOTE — NURSING NOTE
"Chief Complaint   Patient presents with    Sleep Problem     CPAP follow up       Initial /75   Pulse 83   Wt (!) 162.4 kg (358 lb 1.6 oz)   SpO2 97%   BMI 49.94 kg/m   Estimated body mass index is 49.94 kg/m  as calculated from the following:    Height as of 10/10/23: 1.803 m (5' 11\").    Weight as of this encounter: 162.4 kg (358 lb 1.6 oz).    Medication Reconciliation: complete    ESS 4    LORENZA 3    DME: [facility]    Agustin Santamaria CMA (AAMA)  " Psych/Behavioral

## 2023-10-11 NOTE — PROGRESS NOTES
[facility]   Outpatient Sleep Medicine  Oct 11, 2023       Name: Kimo Greenwood MRN# [id number]   Age: 42 year old YOB: 1980            Assessment and Plan:   1. LOREN (obstructive sleep apnea)     Patient's sleep apnea is adequately managed and well treated with current PAP settings [id number] with low residual AHI of 4.6 events per hour.  Leak and AHI both significantly improved in comparison to last visit.  He tolerates these new pressure settings well.  His overall use has decreased somewhat since our last visit, average of 5.75 hours a night but quite variable with minimum use 1 hour and maximum use 14 hours.  Discussed he will get the most benefit if he uses on a nightly basis for entirety of sleep duration but ideally 6+ hours a night and he voiced understanding.  Has yet to register his machine for replacement through [employer] given recall, information was provided again today and they will look into this.  Discussed we can also get him a replacement machine through insurance since his machine is likely over 5 years of age but they politely declined at this time and would like to go through the recall.  Patient tells me that he is no longer interested in bariatric surgery but has been working hard to lose weight and plans to be down 40 pounds by this spring.  Weight is unchanged from his last visit in June.  We agreed to a 6-month follow-up to recheck pressure settings to ensure they are still comfortable for him after planned weight loss.       Chief Complaint      Chief Complaint   Patient presents with    Sleep Problem     CPAP follow up            History of Present Illness:     Kimo Greenwood is a 42-year-old male with morbid obesity, chronic low back pain, asthma, Patel's esophagus, hypertension, depression, RBBB, intellectual delay, moderate to severe LOREN treated with CPAP therapy who presents to clinic today with his PCA for follow-up regarding his LOREN .      Patient was initially diagnosed with LOREN via split night PSG completed 4/15/2013 at [facility] in [place] (344#, BMI 49.2) showing AHI 23.2, RDI 41.1, oxygen gillian 85%.  PLM index 1.5 with PLM arousal 0.4.  CPAP was titrated from 5 to 9 cm H2O with only lateral sleep observed. Patient was started on autoCPAP [id number].     At his last visit 6/2/2023 when establishing care with our office with was struggling with air hunger, large mask leak, and download showed residual AHI of 15.4 events per hour.  We agreed to increase his settings to 9-15 cm H2O to help decrease AHI and give him more comfort.  Also discussed having him go to his [facility] for mask fit/mask consult appointment to find a better fitting mask to help with the leak.  Also discussed recall on his machine and instructions on how to pursue replacement.  Patient is looking to pursue bariatric surgery and in need of sleep clearance, plan to give him clearance once AHI improved a bit.     Returns to clinic today doing well on CPAP with changes made last visit. Kept his same full face mask but got a new one, now fitting better and not leaking anymore. Pressures are now comfortable, no air hunger.     Sleep schedule bit variable recently but often bedtime around 11:00PM and will wake around 5AM, then return to sleep until ~10:30AM.    [employer]ics Auto-PAP 9-15 cmH2O download:  28 total days of use. 2 nonuse days.  Average use 5 hours 47 minutes per day on days used.  56.7% days with >4 hours use.  Large leak 9 minutes per day average. CPAP 90% pressure 14.9 hourscm. AHI 4.6    SCALES:   INSOMNIA: Insomnia Severity Score: 3   SLEEPINESS: Hamilton Sleepiness Score: 4    Past medical/surgical history, family history, social history, medications and allergies were reviewed.           Physical Examination:   /75   Pulse 83   Wt (!) 162.4 kg (358 lb 1.6 oz)   SpO2 97%   BMI 49.94 kg/m    General appearance: Awake, alert,  cooperative. Well groomed. Sitting comfortably in chair. In no apparent distress.  HEENT: Head: Normocephalic, atraumatic. Eyes:Conjunctiva clear. Sclera normal. Nose: External appearance without deformity.   Pulmonary:  Able to speak easily in full sentences. No cough or wheeze.   Skin:  No rashes or significant lesions on visible skin.   Neurologic: Alert, oriented x3.   Psychiatric: Mood euthymic. Affect congruent with full range and intensity.      CC:  Ciro Love PA-C  Oct 11, 2023     [facility]  [address] [place] [address]     [facility]  [address] [address]    Chart documentation was completed, in part, with [employer] voice-recognition software. Even though reviewed, some grammatical, spelling, and word errors may remain.    34 minutes spent on day of encounter doing chart review, history and exam, documentation, and further activities as noted above

## 2023-10-17 ENCOUNTER — TELEPHONE (OUTPATIENT)
Dept: FAMILY MEDICINE | Facility: CLINIC | Age: 43
End: 2023-10-17
Payer: COMMERCIAL

## 2023-10-17 DIAGNOSIS — E66.01 CLASS 3 SEVERE OBESITY DUE TO EXCESS CALORIES WITH SERIOUS COMORBIDITY AND BODY MASS INDEX (BMI) OF 50.0 TO 59.9 IN ADULT (H): Primary | ICD-10-CM

## 2023-10-17 DIAGNOSIS — E66.813 CLASS 3 SEVERE OBESITY DUE TO EXCESS CALORIES WITH SERIOUS COMORBIDITY AND BODY MASS INDEX (BMI) OF 50.0 TO 59.9 IN ADULT (H): Primary | ICD-10-CM

## 2023-10-17 DIAGNOSIS — R26.9 IMPAIRED GAIT: Primary | ICD-10-CM

## 2023-10-17 NOTE — TELEPHONE ENCOUNTER
Crys Mckeon calling regarding needing a rolling walker with a wide seat to assist pt with increasing walking and aiding in weight loss.    If any questions or concerns please call Crys Mckeon at [phone].    Order can be faxed to [facility]: [phone].    Order pended for review.    Judy ALEXANDER RN

## 2023-10-19 NOTE — TELEPHONE ENCOUNTER
Faxed DME orders to number provided by Judy below, will put original on her desk just in case     Jade Lakhani/

## 2023-10-30 ENCOUNTER — TELEPHONE (OUTPATIENT)
Dept: ENDOCRINOLOGY | Facility: CLINIC | Age: 43
End: 2023-10-30

## 2023-10-30 NOTE — TELEPHONE ENCOUNTER
LVM, sent [employer] to inform the pt that their appt today, 10/30 with Aydee Bar will be canceled due to the provider not being available. Reschedule with either Florence Bajwa or Danna Dee. Next available appt. Left CC#

## 2023-10-31 ENCOUNTER — TELEPHONE (OUTPATIENT)
Dept: FAMILY MEDICINE | Facility: CLINIC | Age: 43
End: 2023-10-31
Payer: COMMERCIAL

## 2023-10-31 DIAGNOSIS — M25.552 HIP PAIN, LEFT: Primary | ICD-10-CM

## 2023-10-31 DIAGNOSIS — G89.29 CHRONIC LOW BACK PAIN WITHOUT SCIATICA, UNSPECIFIED BACK PAIN LATERALITY: ICD-10-CM

## 2023-10-31 DIAGNOSIS — R26.89 IMPAIRED GAIT AND MOBILITY: ICD-10-CM

## 2023-10-31 DIAGNOSIS — M54.50 CHRONIC LOW BACK PAIN WITHOUT SCIATICA, UNSPECIFIED BACK PAIN LATERALITY: ICD-10-CM

## 2023-10-31 NOTE — TELEPHONE ENCOUNTER
Jigna - care coordinator calling - needing PT orders for pt out pt therapy near his home     Pt is also needing a foot provider referral for his feet concerns as he already has his ankle braces - his feet are very angled and would like some one to look at them     Please advise on the two referrals that are pended below - also he needs a DME for a wheeled walker     Best # for jigna [phone]     Please review and advise     Thank you     Iris Solitario RN, BSN  [facility]

## 2023-11-01 NOTE — TELEPHONE ENCOUNTER
Spoke with Crys -     Mario referral previously sent to [facility] per pt request - see encounter 10/17/23.    Podiatry referral to be sent to O. Faxed to [facility].    Gave number to call and schedule PT.    Judy ALEXANDER RN

## 2023-11-07 ENCOUNTER — TELEPHONE (OUTPATIENT)
Dept: FAMILY MEDICINE | Facility: CLINIC | Age: 43
End: 2023-11-07
Payer: COMMERCIAL

## 2023-11-07 NOTE — TELEPHONE ENCOUNTER
Crys who says she is mansi care coordinator calling to update on DME order. .    Rolling walking order sent to [facility]. Order needs to say specifically why equipment is needed. He needs to walk but needs to sit more frequently so he can increase his mobility and will need. If specifics are not put in insurance won't cover.     Many diagnosis are on order but they are asking for a hand written explanation of diagnosis or more specifics. For example: patient is unable to safely walk without support and needs frequent breaks to rest when ambulating. Needs seat in walker as unable to walk more than a short distance.     [facility] out of [place].     Traci Acosta R.N.

## 2023-11-09 ENCOUNTER — MEDICAL CORRESPONDENCE (OUTPATIENT)
Dept: HEALTH INFORMATION MANAGEMENT | Facility: CLINIC | Age: 43
End: 2023-11-09
Payer: COMMERCIAL

## 2023-11-10 NOTE — TELEPHONE ENCOUNTER
Yes, you are able to put that in the form.  I have signed this and placed this in the forms completion bin. Jade can get it for you too.    EULALIA

## 2023-11-20 DIAGNOSIS — E66.01 CLASS 3 SEVERE OBESITY DUE TO EXCESS CALORIES WITH SERIOUS COMORBIDITY AND BODY MASS INDEX (BMI) OF 50.0 TO 59.9 IN ADULT (H): ICD-10-CM

## 2023-11-20 DIAGNOSIS — E66.813 CLASS 3 SEVERE OBESITY DUE TO EXCESS CALORIES WITH SERIOUS COMORBIDITY AND BODY MASS INDEX (BMI) OF 50.0 TO 59.9 IN ADULT (H): ICD-10-CM

## 2023-11-24 NOTE — TELEPHONE ENCOUNTER
Semaglutide-Weight Management (WEGOVY) 2.4 MG/0.75ML pen 3 mL 1 10/18/2023     Last Office Visit: 9/7/23  Future Office visit:  12/11/23  Creatinine   Date Value Ref Range Status   09/01/2023 1.25 (H) 0.67 - 1.17 mg/dL Final   10/26/2020 0.86 0.66 - 1.25 mg/dL Final       Called [place] pharmacy. Message left that patient should have enough refills to last until his upcoming appointment on 12/11/23    Damari Mitchell RN

## 2023-11-29 ENCOUNTER — HOSPITAL ENCOUNTER (OUTPATIENT)
Facility: CLINIC | Age: 43
Discharge: HOME | End: 2023-11-29
Attending: INTERNAL MEDICINE | Admitting: INTERNAL MEDICINE
Payer: COMMERCIAL

## 2023-11-29 VITALS
SYSTOLIC BLOOD PRESSURE: 123 MMHG | WEIGHT: 315 LBS | DIASTOLIC BLOOD PRESSURE: 62 MMHG | HEART RATE: 80 BPM | TEMPERATURE: 96.9 F | HEIGHT: 71 IN | BODY MASS INDEX: 44.1 KG/M2 | OXYGEN SATURATION: 99 % | RESPIRATION RATE: 16 BRPM

## 2023-11-29 LAB — UPPER GI ENDOSCOPY: NORMAL

## 2023-11-29 PROCEDURE — 43239 EGD BIOPSY SINGLE/MULTIPLE: CPT | Performed by: INTERNAL MEDICINE

## 2023-11-29 PROCEDURE — 999N000127 HC STATISTIC PERIPHERAL IV START W US GUIDANCE

## 2023-11-29 PROCEDURE — 88305 TISSUE EXAM BY PATHOLOGIST: CPT | Mod: TC | Performed by: INTERNAL MEDICINE

## 2023-11-29 PROCEDURE — 250N000009 HC RX 250: Performed by: INTERNAL MEDICINE

## 2023-11-29 PROCEDURE — 88305 TISSUE EXAM BY PATHOLOGIST: CPT | Mod: 26 | Performed by: PATHOLOGY

## 2023-11-29 PROCEDURE — 999N000099 HC STATISTIC MODERATE SEDATION < 10 MIN: Performed by: INTERNAL MEDICINE

## 2023-11-29 PROCEDURE — 250N000011 HC RX IP 250 OP 636: Performed by: INTERNAL MEDICINE

## 2023-11-29 RX ORDER — FLUMAZENIL 0.1 MG/ML
0.2 INJECTION, SOLUTION INTRAVENOUS
Status: DISCONTINUED | OUTPATIENT
Start: 2023-11-29 | End: 2023-11-29 | Stop reason: HOSPADM

## 2023-11-29 RX ORDER — ONDANSETRON 2 MG/ML
4 INJECTION INTRAMUSCULAR; INTRAVENOUS
Status: DISCONTINUED | OUTPATIENT
Start: 2023-11-29 | End: 2023-11-29 | Stop reason: HOSPADM

## 2023-11-29 RX ORDER — ATROPINE SULFATE 0.1 MG/ML
1 INJECTION INTRAVENOUS
Status: DISCONTINUED | OUTPATIENT
Start: 2023-11-29 | End: 2023-11-29 | Stop reason: HOSPADM

## 2023-11-29 RX ORDER — ONDANSETRON 4 MG/1
4 TABLET, ORALLY DISINTEGRATING ORAL EVERY 6 HOURS PRN
Status: DISCONTINUED | OUTPATIENT
Start: 2023-11-29 | End: 2023-11-29 | Stop reason: HOSPADM

## 2023-11-29 RX ORDER — NALOXONE HYDROCHLORIDE 0.4 MG/ML
0.2 INJECTION, SOLUTION INTRAMUSCULAR; INTRAVENOUS; SUBCUTANEOUS
Status: DISCONTINUED | OUTPATIENT
Start: 2023-11-29 | End: 2023-11-29 | Stop reason: HOSPADM

## 2023-11-29 RX ORDER — DIPHENHYDRAMINE HYDROCHLORIDE 50 MG/ML
25-50 INJECTION INTRAMUSCULAR; INTRAVENOUS
Status: DISCONTINUED | OUTPATIENT
Start: 2023-11-29 | End: 2023-11-29 | Stop reason: HOSPADM

## 2023-11-29 RX ORDER — PROCHLORPERAZINE MALEATE 10 MG
10 TABLET ORAL EVERY 6 HOURS PRN
Status: DISCONTINUED | OUTPATIENT
Start: 2023-11-29 | End: 2023-11-29 | Stop reason: HOSPADM

## 2023-11-29 RX ORDER — FENTANYL CITRATE 50 UG/ML
50-100 INJECTION, SOLUTION INTRAMUSCULAR; INTRAVENOUS EVERY 5 MIN PRN
Status: DISCONTINUED | OUTPATIENT
Start: 2023-11-29 | End: 2023-11-29 | Stop reason: HOSPADM

## 2023-11-29 RX ORDER — NALOXONE HYDROCHLORIDE 0.4 MG/ML
0.4 INJECTION, SOLUTION INTRAMUSCULAR; INTRAVENOUS; SUBCUTANEOUS
Status: DISCONTINUED | OUTPATIENT
Start: 2023-11-29 | End: 2023-11-29 | Stop reason: HOSPADM

## 2023-11-29 RX ORDER — EPINEPHRINE 1 MG/ML
0.1 INJECTION, SOLUTION INTRAMUSCULAR; SUBCUTANEOUS
Status: DISCONTINUED | OUTPATIENT
Start: 2023-11-29 | End: 2023-11-29 | Stop reason: HOSPADM

## 2023-11-29 RX ORDER — ONDANSETRON 2 MG/ML
4 INJECTION INTRAMUSCULAR; INTRAVENOUS EVERY 6 HOURS PRN
Status: DISCONTINUED | OUTPATIENT
Start: 2023-11-29 | End: 2023-11-29 | Stop reason: HOSPADM

## 2023-11-29 RX ORDER — LIDOCAINE 40 MG/G
CREAM TOPICAL
Status: DISCONTINUED | OUTPATIENT
Start: 2023-11-29 | End: 2023-11-29 | Stop reason: HOSPADM

## 2023-11-29 RX ORDER — SIMETHICONE 40MG/0.6ML
133 SUSPENSION, DROPS(FINAL DOSAGE FORM)(ML) ORAL
Status: DISCONTINUED | OUTPATIENT
Start: 2023-11-29 | End: 2023-11-29 | Stop reason: HOSPADM

## 2023-11-29 RX ADMIN — MIDAZOLAM 2 MG: 1 INJECTION INTRAMUSCULAR; INTRAVENOUS at 13:49

## 2023-11-29 RX ADMIN — TOPICAL ANESTHETIC 0.5 ML: 200 SPRAY DENTAL; PERIODONTAL at 13:51

## 2023-11-29 RX ADMIN — FENTANYL CITRATE 100 MCG: 50 INJECTION, SOLUTION INTRAMUSCULAR; INTRAVENOUS at 13:49

## 2023-11-29 ASSESSMENT — ACTIVITIES OF DAILY LIVING (ADL): ADLS_ACUITY_SCORE: 37

## 2023-11-29 NOTE — TELEPHONE ENCOUNTER
Jose calling again.  [facility] is still asking for additional info and refusing to call the clinic.    Family wants to switch to a different home medical provider.  Will be using [facility] in [place].    Per chart, a DME order for a 4 wheeled walker with seat placed in chart 10-31-23.  Jose will call the [place] location to see if anything else is needed from primary care provider for the walker.

## 2023-11-29 NOTE — OR NURSING
Patient having EGD procedure, called legal guardian, Belen for consent.  Belen verified she was ok for the patient to sign his own consent for his procedure today.

## 2023-11-29 NOTE — H&P
Pre-Endoscopy History and Physical     Kimo Greenwood MRN# [id number]   YOB: 1980 Age: 43 year old     Date of Procedure: 11/29/2023  Primary care provider: Ciro Love  Type of Endoscopy: Gastroscopy with possible biopsy, possible dilation  Reason for Procedure: Patel's  Type of Anesthesia Anticipated: Conscious Sedation    HPI:    Kimo is a 43 year old male who will be undergoing the above procedure.      A history and physical has been performed. The patient's medications and allergies have been reviewed. The risks and benefits of the procedure and the sedation options and risks were discussed with the patient.  All questions were answered and informed consent was obtained.      He denies a personal or family history of anesthesia complications or bleeding disorders.     Patient Active Problem List   Diagnosis    Speech/language delay    Tobacco use disorder    Nocturnal enuresis    Moderate major depression (H)    LOREN (obstructive sleep apnea)    Essential hypertension    Class 3 severe obesity with serious comorbidity and body mass index (BMI) of 50.0 to 59.9 in adult (H)    Leukocytosis    Suicidal ideation    Chronic low back pain    Bilateral low back pain with left-sided sciatica    History of colonic polyps    Mild intellectual disability    S/P total hip arthroplasty    Vitamin D deficiency    LPRD (laryngopharyngeal reflux disease)    Patel's esophagus determined by biopsy    Mild intermittent asthma    Somatic dysfunction of lumbar region    Somatic dysfunction of sacral region    Sacral pain    Thoracic segment dysfunction    Hip pain, left    Duodenitis    Overactive bladder    Venous stasis dermatitis of both lower extremities    Severe recurrent major depressive disorder with psychotic features (H)    Alcohol use disorder, mild, abuse    Cannabis use disorder, mild, abuse    Mood change        Past Medical History:   Diagnosis Date    Arthritis     DDD hips and knees     Asthma     Asthma     Patel's esophagus     Chronic pain     Chronic pain - left hip/leg pain     degenerative disc disease, back, hips, knees    Gastroesophageal reflux disease     History of anesthesia complications     agitation x1 after interstim 2013    Hypertension     Hypertension     Leukocytosis     LPRD (laryngopharyngeal reflux disease)     Marijuana abuse     Marijuana abuse     MDD (major depressive disorder)     MDD (major depressive disorder)     Mental retardation, mild (I.Q. 50-70)     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    With associated speech/language delay    Obesity 11/11/2010    LOREN (obstructive sleep apnea)     Uses a CPAP    LOREN (obstructive sleep apnea)     Seborrheic dermatitis     Seborrheic dermatitis     Sleep apnea     CPAP        Past Surgical History:   Procedure Laterality Date    ARTHROPLASTY HIP Left 1/25/2017    Procedure: ARTHROPLASTY HIP;  Surgeon: Bala Randall MD;  Location:  OR    ARTHROPLASTY HIP Left     ARTHROPLASTY REVISION HIP Left 6/24/2019    Procedure: Revision left total hip arthroplasty with a head and liner exchange to a [employer] dual mobility head and liner.;  Surgeon: Bala Randall MD;  Location:  OR    BLADDER SURGERY      Ibarra, [facility],Interstem stimulator implant    CLOSED REDUCTION HIP Left 6/10/2019    Procedure: Closed reduction under general anesthesia left recurrent prosthetic hip dislocation;  Surgeon: Rafat Cazares MD;  Location:  OR    COLONOSCOPY N/A 10/30/2015    Procedure: COMBINED COLONOSCOPY, SINGLE OR MULTIPLE BIOPSY/POLYPECTOMY BY BIOPSY;  Surgeon: Alexis Jackson MD;  Location:  GI    COLONOSCOPY N/A 11/17/2016    Procedure: COMBINED COLONOSCOPY, SINGLE OR MULTIPLE BIOPSY/POLYPECTOMY BY BIOPSY;  Surgeon: Cat Huber MD;  Location:  GI    COLONOSCOPY N/A 10/28/2020    Procedure: COLONOSCOPY;  Surgeon: You Kraus MD;  Location:  OR    COLONOSCOPY       "ESOPHAGOSCOPY, GASTROSCOPY, DUODENOSCOPY (EGD), COMBINED N/A 11/17/2016    Procedure: COMBINED ESOPHAGOSCOPY, GASTROSCOPY, DUODENOSCOPY (EGD), BIOPSY SINGLE OR MULTIPLE;  Surgeon: Cat Huber MD;  Location: [facility] GI    ESOPHAGOSCOPY, GASTROSCOPY, DUODENOSCOPY (EGD), COMBINED N/A 3/12/2020    Procedure: ESOPHAGOGASTRODUODENOSCOPY WITH BIOPSIES;  Surgeon: You Kraus MD;  Location: [facility] OR    ESOPHAGOSCOPY, GASTROSCOPY, DUODENOSCOPY (EGD), COMBINED N/A 10/28/2020    Procedure: ESOPHAGOGASTRODUODENOSCOPY, WITH BIOPSY;  Surgeon: You Kraus MD;  Location: [facility] OR    ESOPHAGOSCOPY, GASTROSCOPY, DUODENOSCOPY (EGD), COMBINED      EXAM UNDER ANESTHESIA, FULGURATE CONDYLOMA ANUS, COMBINED N/A 12/22/2016    Procedure: COMBINED EXAM UNDER ANESTHESIA, FULGURATE CONDYLOMA ANUS;  Surgeon: Nya Cabello MD;  Location: [facility] OR    GENITOURINARY SURGERY      JOINT REPLACEMENT Left 2017    MARGO, Revision Left MARGO    OTHER SURGICAL HISTORY      interstim stimulator implant    MO CYSTOURETHROSCOPY INJ CHEMODENERVATION BLADDER N/A 1/16/2019    Procedure: CYSTOSCOPY BOTOX BLADDER INJECTIONS (100 UNITS IN 10CC);  Surgeon: Didier Ibarra MD;  Location: [facility];  Service: Urology    MO IMPLANT PERIPH/GASTRIC NEUROSTIM/ N/A 1/8/2020    Procedure: NEW INTERSTIM LEAD, REPLACE OLD; NEW INTERSTIM BATTERY, REPLACE OLD;  Surgeon: Didier Ibarra MD;  Location: [facility];  Service: Urology       Social History     Tobacco Use    Smoking status: Every Day     Packs/day: 1.00     Years: 1.00     Additional pack years: 0.00     Total pack years: 1.00     Types: Vaping Device, Cigarettes     Passive exposure: Current    Smokeless tobacco: Current     Types: Chew    Tobacco comments:     Smokes E Cigs equal to 1 PPD   Substance Use Topics    Alcohol use: Yes     Comment: socially--\"not very often\" \"once a month\"       Family History   Problem Relation Age of Onset    Anxiety Disorder Mother     " Depression Mother     Ulcerative Colitis Mother 18    Breast Cancer Maternal Grandmother     Cerebrovascular Disease Maternal Grandmother     Breast Cancer Maternal Aunt     Cancer Maternal Grandfather         grt uncles colon cancer       Prior to Admission medications    Medication Sig Start Date End Date Taking? Authorizing Provider   albuterol (VENTOLIN HFA) 108 (90 Base) MCG/ACT inhaler INHALE 2 PUFFS INTO LUNGS EVERY SIX HOURS AS NEEDED FOR SHORTNESS OF BREATH / DYSPNEA OR WHEEZING 9/1/23  Yes Shirley Ambrocio APRN CNP   calcium polycarbophil (FIBER-LAX) 625 MG tablet Take 2 tablets (1,250 mg) by mouth 2 times daily 6/5/23  Yes Ciro Love MD   DULoxetine (CYMBALTA) 60 MG capsule Take 120 mg by mouth daily  4/25/21  Yes Reported, Patient   hydrochlorothiazide (HYDRODIURIL) 25 MG tablet Take 1 tablet (25 mg) by mouth daily 2/6/23  Yes Ciro Love MD   lisinopril (ZESTRIL) 10 MG tablet Take 1 tablet (10 mg) by mouth every morning 2/6/23  Yes Ciro Love MD   meloxicam (MOBIC) 15 MG tablet Take 1 tablet (15 mg) by mouth daily 9/12/23  Yes Ciro Love MD   mirabegron (MYRBETRIQ) 50 MG 24 hr tablet Take 1 tablet by mouth daily 2/12/22  Yes Reported, Patient   nicotine (NICOTROL) 10 MG inhaler Use 1 cartridge as needed for urge to smoke by puffing over course of 20min.  Use 6-16 cart/day; reduce number of cart/day over 6-12 weeks. 9/13/23  Yes Ciro Love MD   OLANZapine-samidorphan (LYBALVI) 10-10 MG TABS tablet Take 1 tablet by mouth At Bedtime   Yes Reported, Patient   oxyBUTYnin ER (DITROPAN XL) 15 MG 24 hr tablet Take 2 tablets (30 mg) by mouth daily 9/1/23  Yes Ciro Love MD   pantoprazole (PROTONIX) 40 MG EC tablet TAKE 1 TABLET BY MOUTH ONCE DAILY 30-60 MINUTES BR FIRST MEAL OF THE DAY 2/6/23  Yes Ciro Love MD   prazosin (MINIPRESS) 1 MG capsule Take 3 mg by mouth At Bedtime   Yes Unknown, Entered By History   Semaglutide-Weight Management (WEGOVY) 1.7 MG/0.75ML pen Inject 1.7 mg Subcutaneous  "once a week 8/29/23  Yes Nya Lara PA-C   varenicline (CHANTIX WENDI) 0.5 MG X 11 & 1 MG X 42 tablet Take 0.5 mg tab daily for 3 days, THEN 0.5 mg tab twice daily for 4 days, THEN 1 mg twice daily. 9/12/23  Yes Ciro Love MD   order for DME Equipment being ordered: CPAP supplies 8/17/20   Kory Hudson MD   Semaglutide-Weight Management (WEGOVY) 2.4 MG/0.75ML pen Inject 2.4 mg Subcutaneous once a week 10/18/23   Nya Lara PA-C       Allergies   Allergen Reactions    Other [No Clinical Screening - See Comments] Other (See Comments)     Awoke from anesthesia with anger and ripping off dressing, after interstim placement, outside hospital 2013        REVIEW OF SYSTEMS:   5 point ROS negative except as noted above in HPI, including Gen., Resp., CV, GI &  system review.    PHYSICAL EXAM:   /75   Pulse 66   Resp 16   Ht 1.803 m (5' 11\")   Wt (!) 162.4 kg (358 lb)   SpO2 99%   BMI 49.93 kg/m   Estimated body mass index is 49.93 kg/m  as calculated from the following:    Height as of this encounter: 1.803 m (5' 11\").    Weight as of this encounter: 162.4 kg (358 lb).   GENERAL APPEARANCE: alert, and oriented  MENTAL STATUS: alert  AIRWAY EXAM: Mallampatti Class I (visualization of the soft palate, fauces, uvula, anterior and posterior pillars)  RESP: lungs clear to auscultation - no rales, rhonchi or wheezes  CV: regular rates and rhythm  DIAGNOSTICS:    Not indicated    IMPRESSION   ASA Class 2 - Mild systemic disease    PLAN:   Plan for Gastroscopy with possible biopsy, possible dilation. We discussed the risks, benefits and alternatives and the patient wished to proceed.    The above has been forwarded to the consulting provider.      Signed Electronically by: You Kraus MD  November 29, 2023          "

## 2023-11-30 LAB
PATH REPORT.COMMENTS IMP SPEC: NORMAL
PATH REPORT.COMMENTS IMP SPEC: NORMAL
PATH REPORT.FINAL DX SPEC: NORMAL
PATH REPORT.GROSS SPEC: NORMAL
PATH REPORT.MICROSCOPIC SPEC OTHER STN: NORMAL
PATH REPORT.RELEVANT HX SPEC: NORMAL
PHOTO IMAGE: NORMAL

## 2023-12-11 ENCOUNTER — TELEPHONE (OUTPATIENT)
Dept: ENDOCRINOLOGY | Facility: CLINIC | Age: 43
End: 2023-12-11

## 2023-12-11 ENCOUNTER — OFFICE VISIT (OUTPATIENT)
Dept: ENDOCRINOLOGY | Facility: CLINIC | Age: 43
End: 2023-12-11
Payer: COMMERCIAL

## 2023-12-11 VITALS
BODY MASS INDEX: 44.1 KG/M2 | DIASTOLIC BLOOD PRESSURE: 81 MMHG | HEART RATE: 79 BPM | HEIGHT: 71 IN | SYSTOLIC BLOOD PRESSURE: 136 MMHG | OXYGEN SATURATION: 99 % | RESPIRATION RATE: 16 BRPM | WEIGHT: 315 LBS

## 2023-12-11 DIAGNOSIS — E66.813 CLASS 3 SEVERE OBESITY WITH SERIOUS COMORBIDITY AND BODY MASS INDEX (BMI) OF 50.0 TO 59.9 IN ADULT, UNSPECIFIED OBESITY TYPE (H): Primary | ICD-10-CM

## 2023-12-11 DIAGNOSIS — E66.01 CLASS 3 SEVERE OBESITY WITH SERIOUS COMORBIDITY AND BODY MASS INDEX (BMI) OF 50.0 TO 59.9 IN ADULT, UNSPECIFIED OBESITY TYPE (H): Primary | ICD-10-CM

## 2023-12-11 PROCEDURE — 99215 OFFICE O/P EST HI 40 MIN: CPT

## 2023-12-11 ASSESSMENT — PAIN SCALES - GENERAL: PAINLEVEL: EXTREME PAIN (8)

## 2023-12-11 NOTE — PATIENT INSTRUCTIONS
"Thank you for allowing us the privilege of caring for you. We hope we provided you with the excellent service you deserve.   Please let us know if there is anything else we can do for you so that we can be sure you are completely satisfied with your care experience.    To ensure the quality of our services you may be receiving a patient satisfaction survey from an independent patient satisfaction monitoring company.    The greatest compliment you can give is a \"Likely to Recommend\"    Your visit was with Smita Jaramillo PA-C and yNa Lara PA-C today.    Instructions per today's visit:     Robert Greenwood, it was great to visit with you today.  Here is a review of our visit.  If our clinic scheduler is not able to reach you please call [phone] to schedule your next appointments.    Plan  Switch to zepbound 7.5 after completing remaining wegovy 2.4mg doses  Goals we discussed today: keep exploring alternatives to pop such as water with flavoring, bubblr caffeinated sparkling water, etc. Activity goal: trying chair exercise videos on youtube    Labs ordered today: none  Follow up with Nya Camarena in 2-3 months   Dietician appointment 12/15/2023  Keep up the excellent work!       Information about Video Visits with [facility]: video visit information  _________________________________________________________________________________________________________________________________________________________  If you are asked by your clinic team to have your blood pressure checked:  [facility] do offer several locations for blood pressure checks. Please follow the below link to schedule an appointment. Scheduling an appointment at the pharmacy for a blood pressure check is now preferred.    Appointment Plus (appointment-plus.[facility])  _________________________________________________________________________________________________________________________________________________________  Important " contact and scheduling information:  Please call our contact center at [phone] to schedule your next appointments.  To find a lab location near you, please call [phone].  For any nursing questions or concerns call Niurka Haque LPN at [phone] or Denise Hubbard RN at [phone]  Please call during clinic hours Monday through Friday 8:00a - 4:00p if you have questions or you can contact us via "[email or web address]hart at anytime and we will reply during clinic hours.    Lab results will be communicated through My Chart or letter (if My Chart not used). Please call the clinic if you have not received communication after 1 week or if you have any questions.?  Clinic Fax: [phone]    _________________________________________________________________________________________________________________________________________________________  Meal Replacement Products:    Here is the link to our new e-store where you can purchase our meal replacement products    [facility] E-Store  [employer]/store    The one week starter kit is a great way to sample a variety of products and see what works for you.    If you want more information about the product go to: [employer]    If you are an employee or [facility] or [facility] please contact your care team for a 10% estore discount    Free Shipping for orders over $75     Benefits of meal replacements products:    Portion and calorie control  Improved nutrition  Structured eating  Simplified food choices  Avoid contact with trigger foods  _________________________________________________________________________________________________________________________________________________________  Interested in working with a health ?  Health coaches work with you to improve your overall health and wellbeing.  They look at the whole person, and may involve discussion of different areas of life, including, but not  limited to the four pillars of health (sleep, exercise, nutrition, and stress management). Discuss with your care team if you would like to start working a health .  Health Coaching-3 Pack: Schedule by calling [phone]    $99 for three health coaching visits    Visits may be done in person or via phone    Coaching is a partnership between the  and the client; Coaches do not prescribe or diagnose    Coaching helps inspire the client to reach his/her personal goals   _________________________________________________________________________________________________________________________________________________________  24 Week Healthy Lifestyle Plan:    Our mission in the 24-week Healthy Lifestyle Plan is to provide you with individualized care by giving you the tools, education and support you need to lose weight and maintain a healthy lifestyle. In your 24-week journey, you ll be supported by a dedicated weight loss team that includes registered dietitians, medical weight management providers, health coaches, and nurses -- all with special expertise in weight loss -- to help you every step of the way.     Monthly meetings with your registered dietician or medical weight management provider help to review your progress, update your care plan, and make any adjustments needed to ensure success. Between these visits, weekly and bi-weekly health  visits will help you focus on the four pillars of weight loss -- stress, sleep, nutrition, and exercise -- and how you can best adapt each to achieve sustainable weight loss results.    In addition, you will be given exclusive access to online wellbeing classes through [employer].  Your initial visit will be with a medical weight management provider who will help to understand your weight loss goals and ensure this program is the right fit for you. Please let our team know if you are interested in the 24 week plan by sending a message to your care team or  calling [phone] to schedule.  _________________________________________________________________________________________________________________________________________________________  __________  [facility] Get Moving Program  Our team of physical therapists is trained to help you understand and take control of your condition. They will perform a thorough evaluation to determine your ability for activity and develop a customized plan to fit your goals and physical ability.  Scheduling: Unsure if the Get Moving program is right for you? Discuss the program with your medical provider or diabetes educator. You can also call us at [phone] to ask questions or schedule an appointment.   [facility] Get Moving Program  ____________________________________________________________________________________________________________________________________________________________________________   [facility] Diabetes Prevention Program (DPP)  If you have prediabetes and Medicare please contact us via [email or web address] to learn more about the Diabetes Prevention Program (DPP)  Program Details:   [facility] offers the year-long Diabetes Prevention Program (DPP). The program helps you to make lifestyle changes that prevent or delay type 2 diabetes by supporting healthy eating, increased physical activity, stress reduction and use of coping skills.   On average, previous [facility] DPP cohorts have lost and maintained at least 5% of their starting weight throughout the program and averaged more than 150 minutes of physical activity per week.  Participants meet weekly for one-hour group sessions over sixteen weeks, every other week for the next 8 weeks, and monthly for the last six months.   A year-long maintenance program is also available for participants who complete the first year.   Location & Cost:   During the COVID-19 Public Health Emergency, the program is offered virtually. When in-person  classes can resume, they will be held at [facility].  For people with Medicare, the program is covered in full. A self-pay option will also be available for those with non-Medicare insurance plans.   ______________________________________________________________________________________________________________________________________________________________________________________________________________________________    To work with a Behavioral Health Psychologist:    Call to schedule:    Brandon Lino - [phone]  Jerrell Alfredo - [phone]  Chen Dawkins - [phone]  Dania Martino - [phone]   Tasha Rucker PhD (cannot accept Medicare) [phone]        Thank you,   [facility] Comprehensive Weight Management Team                                 Zepbound (Tirzepatide)    Zepbound (Tirzepatide): is an injectable prescription medicine recently FDA approved for adults with obesity or overweight with an additional condition affected by their weight (type 2 diabetes, high blood pressure, high cholesterol, obstructive sleep apnea, etc). Zepbound contains the same active ingredient as what is in Mounjaro, an injectable FDA approved for Type 2 Diabtes. Zepbound is intended to be used along with dietary/behavioral changes and consistent exercise to improve assist with weight loss and other health improvement outcomes.     It is given as a shot once a week. It activates the body's receptors for GIP (glucose-dependent insulinotropic polypeptide) and GLP-1 (glucagon-like peptide-1) receptor, two naturally occurring hormones that help tell the brain that you are full. It also works is by slowing down how quickly food leaves your stomach. What this means for you: You will start to feel groves more quickly, notice portion changes and eat less often between meals. It is still important to maintain consistent eating schedule with meals +/- snacks and get in good  hydration.      Dosing:  Initial dose: 2.5 mg injected subcutaneously once weekly for 4 weeks  Further dose changes can include: increase to 5 mg once weekly for 4 weeks, then 7.5 mg once weekly for 4 weeks, then 10 mg once weekly for 4 weeks then 12.5 mg once weekly for 4 weeks, then 15 mg (maximum dose) once weekly.    Dose changes are based on how you are tolerating the current dose, what benefits you are seeing at the current dose and if improvement in effectiveness of the drug is needed. The goal is to find the dose that works best for you with the least amount of side effects. You may find you reach this at a lower dose than the maximum dose.     Side effects: Patients are advised to eat more slowly and purposefully. Give yourself less portions. You may find after starting this medication you have a new point of fullness. It is also important to stay adequately hydrated on the medication to reduce risks of some of these side effects. Many of these side effects (nausea, diarrhea, etc) occurred during dose escalation but did improve over time with continuing the medication. If side effects are unmanageable, reach out to your prescribing provider.     The risk of pancreatitis (inflammation of the pancreas) has been associated with this type of medication, but is very rare.  If you have had pancreatitis in the past, this medication may not be for you. Please let us know about any past history of pancreas problems. If you experience persistent severe abdominal pain (sometimes radiating to the back potentially accompanied by vomiting and have a fever), stop the medication and contact your provider immediately for assessment. They will do a blood test to check for pancreatitis.       For any questions or concerns please send a [employer] message to our team or call our weight management call center at [phone] during regular business hours.

## 2023-12-11 NOTE — TELEPHONE ENCOUNTER
"PRIOR AUTHORIZATION DENIED    Medication: ZEPBOUND 7.5 MG/0.5ML SC SOAJ  Insurance Company: [employer] Non-Specialty PA's - Phone [phone] Fax [phone]  Denial Date:    Denial Reason(s): \"Message from [employer]: Drug is not covered by plan\"  Appeal Information: N/a    Key: [id number]       "

## 2023-12-11 NOTE — NURSING NOTE
"Chief Complaint   Patient presents with    Follow Up     Return [facility]       Vitals:    12/11/23 1414   BP: 136/81   BP Location: Left arm   Patient Position: Sitting   Cuff Size: Adult Large   Pulse: 79   Resp: 16   SpO2: 99%   Weight: (!) 171.8 kg (378 lb 12.8 oz)   Height: 1.803 m (5' 11\")       Body mass index is 52.83 kg/m .                          Earnest Hernandez NRP  " Topical Clindamycin Counseling: Patient counseled that this medication may cause skin irritation or allergic reactions.  In the event of skin irritation, the patient was advised to reduce the amount of the drug applied or use it less frequently.   The patient verbalized understanding of the proper use and possible adverse effects of clindamycin.  All of the patient's questions and concerns were addressed.

## 2023-12-11 NOTE — PROGRESS NOTES
Return Medical Weight Management Note     Kimo Greenwood  MRN:  [id number]  :  1980  SALE[place]:  2023    Dear Ciro Love MD,    I had the pleasure of seeing your patient Kimo Greenwood. He is a 43 year old male who I am continuing to see for treatment of obesity related to:        2023     1:43 PM   --   I have the following health issues associated with obesity None of the above       Assessment & Plan   Problem List Items Addressed This Visit       Class 3 severe obesity with serious comorbidity and body mass index (BMI) of 50.0 to 59.9 in adult (H) - Primary       Taking 2.4mg wegovy over the last month, is frustrated with lack of weight loss from this. No adverse side effects from this dose increase.  Would like to switch to zepbound today for further help with his weight loss given his recent plateau with the wegovy         Relevant Medications    tirzepatide-Weight Management (ZEPBOUND) 7.5 MG/0.5ML prefilled pen        Continue Wegovy 2.4mg once weekly   Zepbound was denied by insurance  Goals we discussed today: keep exploring alternatives to pop such as water with flavoring, bubblr caffeinated sparkling water, etc.   Activity goal: trying chair exercise videos on youtube   Follow up with Nya Camarena in 2-3 months   Dietician appointment 12/15/2023      Medication started today   ZEPBOUND  No history of pancreatitis   No personal or family history of medullary thyroid carcinoma  Has done well with wegovy but is experiencing a weight plateau of max wegovy dose   .    INTERVAL HISTORY:  First seen for NBS - 2023  Continued with MWM, concerned for post op diet.   Weight loss required for hip replacement <330lbs  Transitioned to Wegovy 1.0mg   3/13/23 - continued Wegovy 1.0mg, goal of decreasing pop to 1-2 bottles daily   Reached out via [employer] 23 - increased Wegovy 1.7mg   23 - continued Wegovy 1.7mg   2023- continued wegovy 1.7, was finding it helpful with hunger and  weight loss           Willard and caregiver do not believe that the 358lb weights are correct, they believe he has been consistently around 380 lbs for the last month.     Anti-obesity medication history    Current:   Wegovy 2.4 mg for 3 weeks with skipping 1 in the middle - no nausea, no vomiting, no heart burn. Has noticed reduced hunger with the 2.4mg dose of wegovy.     GERD symptoms: none     Constipation/Diarrhea: no    Recent diet changes: has been drinking more pop lately, pepsi or code red, around 60oz of pop daily. Is working on cutting this down.    Recent exercise/activity changes: Hasn't been able to get much activity in.         CURRENT WEIGHT:   378 lbs 12.8 oz    Initial Weight (lbs): 407 lbs  Last Visits Weight: 162.4 kg (358 lb)  Cumulative weight loss (lbs): 28.2  Weight Loss Percentage: 6.93%  Waist Circumference (cm): 160 cm    Wt Readings from Last 5 Encounters:   12/12/23 (!) 171 kg (377 lb)   12/11/23 (!) 171.8 kg (378 lb 12.8 oz)   11/29/23 (!) 162.4 kg (358 lb)   10/11/23 (!) 162.4 kg (358 lb 1.6 oz)   10/10/23 (!) 172.4 kg (380 lb)             12/11/2023     2:20 PM   Changes and Difficulties   I have made the following changes to my diet since my last visit: Eating less. Added more protein   With regards to my diet, I am still struggling with: too much soda   I have made the following changes to my activity/exercise since my last visit: none   With regards to my activity/exercise, I am still struggling with: starting             MEDICATIONS:   Current Outpatient Medications   Medication Sig Dispense Refill    tirzepatide-Weight Management (ZEPBOUND) 7.5 MG/0.5ML prefilled pen Inject 0.5 mLs (7.5 mg) Subcutaneous every 7 days (Patient not taking: Reported on 12/12/2023) 2 mL 2    albuterol (VENTOLIN HFA) 108 (90 Base) MCG/ACT inhaler INHALE 2 PUFFS INTO LUNGS EVERY SIX HOURS AS NEEDED FOR SHORTNESS OF BREATH / DYSPNEA OR WHEEZING 18 g 1    calcium polycarbophil (FIBER-LAX) 625 MG tablet Take  2 tablets (1,250 mg) by mouth 2 times daily 360 tablet 0    DULoxetine (CYMBALTA) 60 MG capsule Take 120 mg by mouth daily       hydrochlorothiazide (HYDRODIURIL) 25 MG tablet Take 1 tablet (25 mg) by mouth daily 30 tablet 7    lisinopril (ZESTRIL) 10 MG tablet Take 1 tablet (10 mg) by mouth every morning 30 tablet 7    meloxicam (MOBIC) 15 MG tablet Take 1 tablet (15 mg) by mouth daily 30 tablet 11    mirabegron (MYRBETRIQ) 50 MG 24 hr tablet Take 1 tablet by mouth daily      nicotine (NICOTROL) 10 MG inhaler Use 1 cartridge as needed for urge to smoke by puffing over course of 20min.  Use 6-16 cart/day; reduce number of cart/day over 6-12 weeks. 6 each 3    nicotine (NICOTROL) 10 MG inhaler Use 1 cartridge as needed for urge to smoke by puffing over course of 20min.  Use 6-16 cart/day; reduce number of cart/day over 6-12 weeks. 158 each 1    OLANZapine-samidorphan (LYBALVI) 10-10 MG TABS tablet Take 1 tablet by mouth At Bedtime      order for DME Equipment being ordered: CPAP supplies 1 each 0    oxyBUTYnin ER (DITROPAN XL) 15 MG 24 hr tablet Take 2 tablets (30 mg) by mouth daily 60 tablet 1    pantoprazole (PROTONIX) 40 MG EC tablet TAKE 1 TABLET BY MOUTH ONCE DAILY 30-60 MINUTES BR FIRST MEAL OF THE DAY 30 tablet 7    prazosin (MINIPRESS) 1 MG capsule Take 3 mg by mouth At Bedtime      Semaglutide-Weight Management (WEGOVY) 2.4 MG/0.75ML pen Inject 2.4 mg Subcutaneous once a week 3 mL 1    varenicline (CHANTIX WENDI) 0.5 MG X 11 & 1 MG X 42 tablet Take 0.5 mg tab daily for 3 days, THEN 0.5 mg tab twice daily for 4 days, THEN 1 mg twice daily. (Patient not taking: Reported on 12/12/2023) 53 tablet 0           12/11/2023     2:20 PM   Weight Loss Medication History Reviewed With Patient   Which weight loss medications are you currently taking on a regular basis? Wegovy   Are you having any side effects from the weight loss medication that we have prescribed you? No             1/22/2021     2:28 PM 12/12/2023     "12:53 PM   TIMOTHY Score (Last Two)   TIMOTHY Raw Score 29 37   Activation Score 52.9 79.2   TIMOTHY Level 2 4           Objective    /81 (BP Location: Left arm, Patient Position: Sitting, Cuff Size: Adult Large)   Pulse 79   Resp 16   Ht 1.803 m (5' 11\")   Wt (!) 171.8 kg (378 lb 12.8 oz)   SpO2 99%   BMI 52.83 kg/m      PHYSICAL EXAM:  GENERAL: Healthy, alert and no distress  EYES: Eyes grossly normal to inspection.  No discharge or erythema, or obvious scleral/conjunctival abnormalities.  RESP: No audible wheeze, cough, or visible cyanosis.  No visible retractions or increased work of breathing.    SKIN: Visible skin clear. No significant rash, abnormal pigmentation or lesions.  NEURO: Cranial nerves grossly intact.  Mentation and speech appropriate for age and autism diagnosis.   PSYCH: Mentation appears consistent, affect normal, judgement and insight intact, normal speech and appearance well-groomed.        Sincerely,  Smita Jaramillo PA-C    I have personally seen and examined patient with Smita Jaramillo PA-C and I agree with findings, assessments and plan as documented.    MONA ONOFRE PA-C             60 minutes spent by me on the date of the encounter doing chart review, history and exam, documentation and further activities per the note   "

## 2023-12-11 NOTE — LETTER
2023       RE: Kimo Greenwood   [address]  [address]  [facility] [address]     Dear Colleague,    Thank you for referring your patient, Kimo Greenwood, to the [facility] [place] at [facility]. Please see a copy of my visit note below.      Return Medical Weight Management Note     Kimo Greenwood  MRN:  [id number]  :  1980  SALE[place]:  2023    Dear Ciro Love MD,    I had the pleasure of seeing your patient Kimo Greenwood. He is a 43 year old male who I am continuing to see for treatment of obesity related to:        2023     1:43 PM   --   I have the following health issues associated with obesity None of the above       Assessment & Plan  Problem List Items Addressed This Visit       Class 3 severe obesity with serious comorbidity and body mass index (BMI) of 50.0 to 59.9 in adult (H) - Primary       Taking 2.4mg wegovy over the last month, is frustrated with lack of weight loss from this. No adverse side effects from this dose increase.  Would like to switch to zepbound today for further help with his weight loss given his recent plateau with the wegovy         Relevant Medications    tirzepatide-Weight Management (ZEPBOUND) 7.5 MG/0.5ML prefilled pen        Continue Wegovy 2.4mg once weekly   Zepbound was denied by insurance  Goals we discussed today: keep exploring alternatives to pop such as water with flavoring, bubblr caffeinated sparkling water, etc.   Activity goal: trying chair exercise videos on youtube   Follow up with Nya Camarena in 2-3 months   Dietician appointment 12/15/2023      Medication started today   ZEPBOUND  No history of pancreatitis   No personal or family history of medullary thyroid carcinoma  Has done well with wegovy but is experiencing a weight plateau of max wegovy dose   .    INTERVAL HISTORY:  First seen for NBS - 2023  Continued with MWM, concerned for post  op diet.   Weight loss required for hip replacement <330lbs  Transitioned to Wegovy 1.0mg   3/13/23 - continued Wegovy 1.0mg, goal of decreasing pop to 1-2 bottles daily   Reached out via [employer] 4/12/23 - increased Wegovy 1.7mg   6/8/23 - continued Wegovy 1.7mg   9/7/2023- continued wegovy 1.7, was finding it helpful with hunger and weight loss           Willard and caregiver do not believe that the 358lb weights are correct, they believe he has been consistently around 380 lbs for the last month.     Anti-obesity medication history    Current:   Wegovy 2.4 mg for 3 weeks with skipping 1 in the middle - no nausea, no vomiting, no heart burn. Has noticed reduced hunger with the 2.4mg dose of wegovy.     GERD symptoms: none     Constipation/Diarrhea: no    Recent diet changes: has been drinking more pop lately, pepsi or code red, around 60oz of pop daily. Is working on cutting this down.    Recent exercise/activity changes: Hasn't been able to get much activity in.         CURRENT WEIGHT:   378 lbs 12.8 oz    Initial Weight (lbs): 407 lbs  Last Visits Weight: 162.4 kg (358 lb)  Cumulative weight loss (lbs): 28.2  Weight Loss Percentage: 6.93%  Waist Circumference (cm): 160 cm    Wt Readings from Last 5 Encounters:   12/12/23 (!) 171 kg (377 lb)   12/11/23 (!) 171.8 kg (378 lb 12.8 oz)   11/29/23 (!) 162.4 kg (358 lb)   10/11/23 (!) 162.4 kg (358 lb 1.6 oz)   10/10/23 (!) 172.4 kg (380 lb)             12/11/2023     2:20 PM   Changes and Difficulties   I have made the following changes to my diet since my last visit: Eating less. Added more protein   With regards to my diet, I am still struggling with: too much soda   I have made the following changes to my activity/exercise since my last visit: none   With regards to my activity/exercise, I am still struggling with: starting             MEDICATIONS:   Current Outpatient Medications   Medication Sig Dispense Refill    tirzepatide-Weight Management (ZEPBOUND) 7.5 MG/0.5ML  prefilled pen Inject 0.5 mLs (7.5 mg) Subcutaneous every 7 days (Patient not taking: Reported on 12/12/2023) 2 mL 2    albuterol (VENTOLIN HFA) 108 (90 Base) MCG/ACT inhaler INHALE 2 PUFFS INTO LUNGS EVERY SIX HOURS AS NEEDED FOR SHORTNESS OF BREATH / DYSPNEA OR WHEEZING 18 g 1    calcium polycarbophil (FIBER-LAX) 625 MG tablet Take 2 tablets (1,250 mg) by mouth 2 times daily 360 tablet 0    DULoxetine (CYMBALTA) 60 MG capsule Take 120 mg by mouth daily       hydrochlorothiazide (HYDRODIURIL) 25 MG tablet Take 1 tablet (25 mg) by mouth daily 30 tablet 7    lisinopril (ZESTRIL) 10 MG tablet Take 1 tablet (10 mg) by mouth every morning 30 tablet 7    meloxicam (MOBIC) 15 MG tablet Take 1 tablet (15 mg) by mouth daily 30 tablet 11    mirabegron (MYRBETRIQ) 50 MG 24 hr tablet Take 1 tablet by mouth daily      nicotine (NICOTROL) 10 MG inhaler Use 1 cartridge as needed for urge to smoke by puffing over course of 20min.  Use 6-16 cart/day; reduce number of cart/day over 6-12 weeks. 6 each 3    nicotine (NICOTROL) 10 MG inhaler Use 1 cartridge as needed for urge to smoke by puffing over course of 20min.  Use 6-16 cart/day; reduce number of cart/day over 6-12 weeks. 158 each 1    OLANZapine-samidorphan (LYBALVI) 10-10 MG TABS tablet Take 1 tablet by mouth At Bedtime      order for DME Equipment being ordered: CPAP supplies 1 each 0    oxyBUTYnin ER (DITROPAN XL) 15 MG 24 hr tablet Take 2 tablets (30 mg) by mouth daily 60 tablet 1    pantoprazole (PROTONIX) 40 MG EC tablet TAKE 1 TABLET BY MOUTH ONCE DAILY 30-60 MINUTES BR FIRST MEAL OF THE DAY 30 tablet 7    prazosin (MINIPRESS) 1 MG capsule Take 3 mg by mouth At Bedtime      Semaglutide-Weight Management (WEGOVY) 2.4 MG/0.75ML pen Inject 2.4 mg Subcutaneous once a week 3 mL 1    varenicline (CHANTIX WENDI) 0.5 MG X 11 & 1 MG X 42 tablet Take 0.5 mg tab daily for 3 days, THEN 0.5 mg tab twice daily for 4 days, THEN 1 mg twice daily. (Patient not taking: Reported on  "12/12/2023) 53 tablet 0           12/11/2023     2:20 PM   Weight Loss Medication History Reviewed With Patient   Which weight loss medications are you currently taking on a regular basis? Wegovy   Are you having any side effects from the weight loss medication that we have prescribed you? No             1/22/2021     2:28 PM 12/12/2023    12:53 PM   TIMOTHY Score (Last Two)   TIMOTHY Raw Score 29 37   Activation Score 52.9 79.2   TIMOTHY Level 2 4           Objective   /81 (BP Location: Left arm, Patient Position: Sitting, Cuff Size: Adult Large)   Pulse 79   Resp 16   Ht 1.803 m (5' 11\")   Wt (!) 171.8 kg (378 lb 12.8 oz)   SpO2 99%   BMI 52.83 kg/m      PHYSICAL EXAM:  GENERAL: Healthy, alert and no distress  EYES: Eyes grossly normal to inspection.  No discharge or erythema, or obvious scleral/conjunctival abnormalities.  RESP: No audible wheeze, cough, or visible cyanosis.  No visible retractions or increased work of breathing.    SKIN: Visible skin clear. No significant rash, abnormal pigmentation or lesions.  NEURO: Cranial nerves grossly intact.  Mentation and speech appropriate for age and autism diagnosis.   PSYCH: Mentation appears consistent, affect normal, judgement and insight intact, normal speech and appearance well-groomed.        Sincerely,  Smita Jaramillo PA-C    I have personally seen and examined patient with Smita Jaramillo PA-C and I agree with findings, assessments and plan as documented.    MONA ONOFRE PA-C             60 minutes spent by me on the date of the encounter doing chart review, history and exam, documentation and further activities per the note     "

## 2023-12-12 ENCOUNTER — OFFICE VISIT (OUTPATIENT)
Dept: FAMILY MEDICINE | Facility: CLINIC | Age: 43
End: 2023-12-12
Payer: COMMERCIAL

## 2023-12-12 VITALS
WEIGHT: 315 LBS | BODY MASS INDEX: 44.1 KG/M2 | TEMPERATURE: 97.7 F | RESPIRATION RATE: 20 BRPM | DIASTOLIC BLOOD PRESSURE: 78 MMHG | HEIGHT: 71 IN | HEART RATE: 73 BPM | SYSTOLIC BLOOD PRESSURE: 110 MMHG | OXYGEN SATURATION: 97 %

## 2023-12-12 DIAGNOSIS — Z71.6 ENCOUNTER FOR SMOKING CESSATION COUNSELING: Primary | ICD-10-CM

## 2023-12-12 PROCEDURE — 91320 SARSCV2 VAC 30MCG TRS-SUC IM: CPT | Performed by: FAMILY MEDICINE

## 2023-12-12 PROCEDURE — 90480 ADMN SARSCOV2 VAC 1/ONLY CMP: CPT | Performed by: FAMILY MEDICINE

## 2023-12-12 PROCEDURE — 99213 OFFICE O/P EST LOW 20 MIN: CPT | Performed by: FAMILY MEDICINE

## 2023-12-12 ASSESSMENT — ENCOUNTER SYMPTOMS
FEVER: 0
COUGH: 0

## 2023-12-12 NOTE — ASSESSMENT & PLAN NOTE
Taking 2.4mg wegovy over the last month, is frustrated with lack of weight loss from this. No adverse side effects from this dose increase.  Would like to switch to zepbound today for further help with his weight loss given his recent plateau with the wegovy

## 2023-12-12 NOTE — PROGRESS NOTES
"  Assessment & Plan     Encounter for smoking cessation counseling  Recommend outpatient therapy, continue nicotine replacement therapy.  Advised to have a smoking cessation date.  - nicotine (NICOTROL) 10 MG inhaler  Dispense: 6 each; Refill: 3  - Adult Mental Health  Referral  - nicotine (NICORETTE) 2 MG gum  Dispense: 50 each; Refill: 2        Ciro Love MD  [facility] SOPHIE Lamar is a 43 year old, presenting for the following health issues:    Follow Up (Kidney Function) and Smoking Cessation        12/12/2023    12:57 PM   Additional Questions   Roomed by Erin FORMAN       History of Present Illness       Reason for visit:  Follow up    He eats 0-1 servings of fruits and vegetables daily.He consumes 3 sweetened beverage(s) daily.He exercises with enough effort to increase his heart rate 9 or less minutes per day.  He exercises with enough effort to increase his heart rate 3 or less days per week. He is missing 1 dose(s) of medications per week.     Patient presents for follow-up of smoking cessation, has not yet quit continues to chew vape and smoke cigarettes.      Review of Systems   Constitutional:  Negative for fever.   Respiratory:  Negative for cough.             Objective    /78 (Cuff Size: Adult Large)   Pulse 73   Temp 97.7  F (36.5  C) (Oral)   Resp 20   Ht 1.803 m (5' 11\")   Wt (!) 171 kg (377 lb)   SpO2 97%   BMI 52.58 kg/m    Body mass index is 52.58 kg/m .  Physical Exam  Constitutional:       Appearance: He is not ill-appearing.   Cardiovascular:      Rate and Rhythm: Normal rate and regular rhythm.   Pulmonary:      Effort: Pulmonary effort is normal.      Breath sounds: Normal breath sounds.                      Last Comprehensive Metabolic Panel:  Lab Results   Component Value Date     (L) 09/01/2023    POTASSIUM 3.4 09/01/2023    CHLORIDE 97 (L) 09/01/2023    CO2 27 09/01/2023    ANIONGAP 10 09/01/2023     (H) 09/01/2023    " BUN 18.1 09/01/2023    CR 1.25 (H) 09/01/2023    GFRESTIMATED 74 09/01/2023    FILEMON 9.3 09/01/2023

## 2023-12-19 DIAGNOSIS — E66.813 CLASS 3 SEVERE OBESITY DUE TO EXCESS CALORIES WITH SERIOUS COMORBIDITY AND BODY MASS INDEX (BMI) OF 50.0 TO 59.9 IN ADULT (H): ICD-10-CM

## 2023-12-19 DIAGNOSIS — E66.01 CLASS 3 SEVERE OBESITY DUE TO EXCESS CALORIES WITH SERIOUS COMORBIDITY AND BODY MASS INDEX (BMI) OF 50.0 TO 59.9 IN ADULT (H): ICD-10-CM

## 2023-12-21 NOTE — TELEPHONE ENCOUNTER
Semaglutide-Weight Management (WEGOVY) 2.4 MG/0.75ML pen 3 mL 1 10/18/2023     Last Office Visit: 12/11/23  Future Office visit:   2/22/24    Creatinine   Date Value Ref Range Status   09/01/2023 1.25 (H) 0.67 - 1.17 mg/dL Final   10/26/2020 0.86 0.66 - 1.25 mg/dL Final       Routing refill request to provider for review/approval because:  Abnormal lab    Damari Mitchell RN

## 2023-12-27 NOTE — PROGRESS NOTES
"Video-Visit Details    Type of service:  Video Visit    Video Start Time: 2:03 pm  Video End Time: 2:22 pm    Originating Location (pt. Location): Home    Distant Location (provider location):  Offsite (providers home)     Platform used for Video Visit: [facility]    Nutrition Consultation Note    Reason For Visit: Nutrition Assessment    Kimo Greenwood is a 43 year old presenting today for return nutrition consult.   Pt is interested in laparoscopic sleeve gastrectomy.  Will proceed with MWM at this time per pt/his mother. Did NOT review any surgery dietary guidelines yet.     Pt referred by JERRY Marinelli on January 10, 2023.    Coordination note (if pursuing surgery in future)   Needs baseline labs - Done 1/9/23, would need to be re-done due to being outdated soon  Needs Psych eval  Needs PCP letter of support  40 lb weight loss from initial  Surgeon meet and greet with Dr. Madrigal  Needs letter of support from therapist  Needs letter of support from psychiatrist       CO-MORBIDITIES OF OBESITY INCLUDE:        1/9/2023     1:43 PM   --   I have the following health issues associated with obesity None of the above     PMH:     Per PA note:  \"Hip OA - needing hip replacement.      LOREN - uses CPAP every night. Followed by PCP      Barretts esophagus - has not had any GERD symptoms recently. Well controlled on PPI.      HTN - well controlled on medications      Degenerative disk disease - causes low back pain. 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 Followed by urology\"    Depression - has therapist/psych, hx of suicide attempt per questionnaire     Speech/Language delay    SUPPORT:      1/9/2023     1:43 PM   Support System Reviewed With Patient   Who do you have in your support network that can be available to help you for the first 2 weeks after surgery? agency   Who can you count on for support throughout your weight loss surgery journey? fully supportive       ANTHROPOMETRICS:  Consult " "weight 1/9/23: 407 lbs with BMI 55.28    Estimated body mass index is 52.58 kg/m  as calculated from the following:    Height as of 12/12/23: 1.803 m (5' 11\").    Weight as of 12/12/23: 171 kg (377 lb).     Current weight: 377 lbs, pt report   - Down 30 lbs since consult    Goal weight for hip replacement per Nya Camarena's note: 330 lbs         No data to display                    1/9/2023     1:43 PM   Weight Loss Questions Reviewed With Patient   How long have you been overweight? Since early childhood     MEDICATIONS FOR WEIGHT LOSS:  Wegovy prescribed 1/9/22     Hx   Saxenda -  Had been denied at first but was able to get with appeal per pt  Topiramate (ineffective)    SUPPLEMENT INFORMATION:  Vitamin D3 5,000 international unit(s)/day  B complex    Labs 6/19/23  Vit D WNL  TSH  WNL    Hx of Vit D def    NUTRITION HISTORY:  Pt present today with his mom and legal guardian, Beeln.     Patient is considering Bariatric surgery. Hoping to lose weight for hip replacement with goal of 330 lbs. Weight gain due to changes in diet and decrease in activity.     Wants to work on MWM first and consider surgical approach down the line as appropriate.     Worked with a RD a long time ago at [facility] for weight loss.     Mobility is very limited.   Moving into an apartment with a pool next month.     Per PA note (not yet finalized at time of writing this)    Eating 2 meals a day, with snacks. Trying to decrease pop. Meals are provided by group home. However, will go to the store to get candy, chip, cookies, crackers, ice cream, and pudding, and soda. Has increased hunger. Struggles with getting full. From exam, however Willard have increased hunger,   Breakfast - cereal with milk   Lunch - provided by group home  Dinner - pizza   Snacks - cookies, animal crackers   Pop - 1 liter of regular pop at a time.      Activity - Hip pain limits activity. Will try to get up and walk around as much as possible. Cannot walk more then 50 yards " at a time.     Please see previous RD notes for more detail    April 2023:  Using sugar-free flavor packs to help with hydration. Drinking 2-3 water bottles/day.   Also drinking some coffee in the mornings.  Other fluids include: OJ, low fat chocolate milk (about 1/2 gallon), powerade  Also working on buying 6 packs instead of 12 packs of pop.     Started having breakfast more often. Bought some cinnamon special K and made banana pancakes.    Lunch examples chicken salad sandwich, tuna sandwich, pb & j. Yesterday had chicken and some oranges.     Dinner examples mac n cheese, tuscon soup, kilbasa, venison steak with salad and fruit, fajitas with low carb wraps    June 2023:  Continues to progress with dietary goals.   Doing a light OJ now as opposed to regular.     Working on eating more fruits/vegetables.   Snacking on celery/pb, raisins, string cheese wrapped in ham, etc.    Meal examples: grilled chicken with bruschetta topping and red potatoes, chicken fajitas, fruit smoothies    Goes grocery shopping 1x/week and utilizing food shelf 1x/week.    Dec 2023:  Last RD appt in June - scheduling issues on provider/pt end since then (provider out sick, pt not available etc).     Pt present with Jose care helper.     Continues to notice weight loss.    Struggling with giving up pop and chocolate milk - drinking 1-2 bottles/day. On Fridays may get a 52 oz bottle.   Also drinking juice, beer and chocolate milk.     Buys 1/2 gallon of chocolate milk each week.      Ice cream - typically does about 1 quart/week. Tried frozen yogurt but didn't like as much.    Previous goals: Outdated, goals were set 6 months ago. Made new ones today  1. Aim to get more water flavorings    2. Aim to have 1 single serving of chocolate milk/week -   3. Aim for 2 meals/day   4. Grocery list: yogurt, water flavor packs  5. Consider adding yogurt to smoothie to make it more of a meal and help increase protein intake    Additional  information:  Disabled - lives at [facility] - moving Feb 6th to a crisis housing apartment, will have ILS staff     Single         1/9/2023     1:43 PM   Recall Diet Questions Reviewed With Patient   Describe what you typically consume for lunch (typical or most recent) candy soda(8)cans to 10cans a day   Describe what you typically consume for supper (typical or most recent) pizza   How many ounces of water, or other low calorie drinks, do you drink daily (8 oz=1 glass)? 0 oz   How many ounces of caffeine (coffee, tea, pop) do you drink daily (8 oz=1 glass)? 24 oz   How many ounces of milk do you drink daily (8 oz=1 glass) 0 oz   How often do you drink alcohol? Never           1/9/2023     1:43 PM   Eating Habits   Do you have any dietary restrictions? No   Do you currently binge eat (eat a large amount of food in a short time)? Yes   Are you an emotional eater? Yes   Do you get up to eat after falling asleep? Yes           1/9/2023     1:43 PM   Dining Out History Reviewed With Patient   How often do you dine out? Never.   Where do you dine out? (select all that apply) fast food chains   What types of food do you order when you dine out? hamburger       EXERCISE:        1/9/2023     1:43 PM   --   How often do you exercise? Never   What keeps you from being more active? Pain       NUTRITION DIAGNOSIS:  Obesity r/t long history of positive energy balance aeb BMI >30 kg/m2.    INTERVENTION:  Reviewed current dietary habits and pts history   Discussed long-term goals pt hopes to accomplish in RD appointments  Answered pt questions  Coordination of care   Nutrition education - Plate method, fats, low kcal beverages, starchy vs non-starchy vegetables, sources of carbohydrates, lean protein vs fattier proteins  AVS and handouts via [employer]          1/9/2023     1:43 PM   Questions Reviewed With Patient   How ready are you to make changes regarding your weight? Number 1 = Not ready at all to  make changes up to 10 = very ready. 1   How confident are you that you can change? 1 = Not confident that you will be successful making changes up to 10 = very confident. 1     Expected Engagement: fair-good (good engagement during appt however pt rated confidence as a 1 per questionnaire)     GOALS:  Aim to decrease pop consumption to 1 20 oz bottle/day and 1 30 oz bottle on Fridays and replace with carbonated infused water  Consider Mukesh's and [employer] for single serve chocolate milk. Aim for 5 8 oz milks/week   Aim for 1 quart of ice cream every other week     Protein shake examples:  Premier Protein (160 Calories, 30 g protein)  Boost/Ensure Max (160 calories, 30 gm protein)   Fairlife Core Power (170 calories, 26 gm protein)  Aldi's Elevation Protein Shake (160 calories, 30 gm protein)   Equate Protein Shake (160 calories, 30 gm protein)  Slim Fast Advanced Nutrition (180 Calories, 20 g protein)  Muscle Milk, lactose-free, 17 oz bottle (210 Calories, 30 g protein)    Ideas to bring on the go:   - Nutrigrain bars   - Almonds   - Tuna    - Snack packs   - Dried fruit (try to find no sugar added)   - Yogurt   - Yogurt covered nuts     Meal ideas or components discussed: honey mustard chicken, baked beans, asparagus, broccoli, chili with beans and beef, white chicken chili with onions/peppers, fish - walleye, salmon, etc., Barilla Protein + pasta, loaded baked potatoe, sweet potatoe breakfast skillet with eggs, tuna salad, tuna casserole     RESOURCES:     Plate method can be used for general guidance on balanced meals/portion sizes (see link below for picture/more information)                 Make 1/2 your plate non starchy vegetables (cauliflower, broccoli, asparagus, [place] sprouts, lettuce, carrots, for example).                5+ oz lean protein sources (salmon/skinless chicken/turkey breast/pork loin/lean cuts of beef/ or non-animal protein such as black, kidney or kuo beans, tofu/edamame/tempeh)     (Note:  3 oz = deck of cards size)                 ~1 cup carbohydrate choices such as whole grain starches or starchy vegetables or fruit. For example: quinoa, brown rice, barley, potatoes, sweet potatoes, winter squash, peas, corn, or fruit.               Choose ~0-2 added fat servings at a meal (avocados, nut butters, nuts or seeds, olive oil, vegetable oils).    The Plate Method:  https://www.cdc.gov/diabetes/images/managing/Diabetes-Manage-Eat-Well-Plate-Graphic_600px.jpg    Building a Plate  [email or web address]    Protein Sources for Weight Loss  [email or web address]     Carbohydrates  [email or web address]     Mindful Eating  [email or web address]     Summary of Volumetrics Eating Plan  [email or web address]     Seated Exercises for Arms and Legs (can be done before or after surgery)  [email or web address]    Follow up:    Tuesday, January 30th at 2:00 pm    Time spent with patient: 19 minutes.  DHEERAJ Israel, RD, LD

## 2023-12-29 ENCOUNTER — TELEPHONE (OUTPATIENT)
Dept: ENDOCRINOLOGY | Facility: CLINIC | Age: 43
End: 2023-12-29

## 2023-12-29 ENCOUNTER — VIRTUAL VISIT (OUTPATIENT)
Dept: ENDOCRINOLOGY | Facility: CLINIC | Age: 43
End: 2023-12-29
Payer: COMMERCIAL

## 2023-12-29 VITALS — WEIGHT: 315 LBS | HEIGHT: 72 IN | BODY MASS INDEX: 42.66 KG/M2

## 2023-12-29 DIAGNOSIS — E66.9 OBESITY: ICD-10-CM

## 2023-12-29 DIAGNOSIS — Z71.3 NUTRITIONAL COUNSELING: Primary | ICD-10-CM

## 2023-12-29 PROCEDURE — 99207 PR NO CHARGE LOS: CPT | Mod: VID | Performed by: DIETITIAN, REGISTERED

## 2023-12-29 PROCEDURE — 97803 MED NUTRITION INDIV SUBSEQ: CPT | Mod: VID | Performed by: DIETITIAN, REGISTERED

## 2023-12-29 ASSESSMENT — PAIN SCALES - GENERAL: PAINLEVEL: SEVERE PAIN (7)

## 2023-12-29 NOTE — PATIENT INSTRUCTIONS
GOALS:  Aim to decrease pop consumption to 1 20 oz bottle/day and 1 30 oz bottle on Fridays and replace with carbonated infused water  Consider Mukesh's and [employer] for single serve chocolate milk. Aim for 5 8 oz milks/week   Aim for 1 quart every other week     Protein shake examples:  Premier Protein (160 Calories, 30 g protein)  Boost/Ensure Max (160 calories, 30 gm protein)   Fairlife Core Power (170 calories, 26 gm protein)  Aldi's Elevation Protein Shake (160 calories, 30 gm protein)   Equate Protein Shake (160 calories, 30 gm protein)  Slim Fast Advanced Nutrition (180 Calories, 20 g protein)  Muscle Milk, lactose-free, 17 oz bottle (210 Calories, 30 g protein)    Ideas to bring on the go:   - Nutrigrain bars   - Almonds   - Tuna    - Snack packs   - Dried fruit (try to find no sugar added)   - Yogurt   - Yogurt covered nuts     Meal ideas or components discussed: honey mustard chicken, baked beans, asparagus, broccoli, chili with beans and beef, white chicken chili with onions/peppers, fish - walleye, salmon, etc., Barilla Protein + pasta, loaded baked potatoe, sweet potatoe breakfast skillet with eggs, tuna salad, tuna casserole     RESOURCES:     Plate method can be used for general guidance on balanced meals/portion sizes (see link below for picture/more information)                 Make 1/2 your plate non starchy vegetables (cauliflower, broccoli, asparagus, [place] sprouts, lettuce, carrots, for example).                5+ oz lean protein sources (salmon/skinless chicken/turkey breast/pork loin/lean cuts of beef/ or non-animal protein such as black, kidney or kuo beans, tofu/edamame/tempeh)     (Note: 3 oz = deck of cards size)                 ~1 cup carbohydrate choices such as whole grain starches or starchy vegetables or fruit. For example: quinoa, brown rice, barley, potatoes, sweet potatoes, winter squash, peas, corn, or fruit.               Choose ~0-2 added fat servings at a meal (avocados, nut  butters, nuts or seeds, olive oil, vegetable oils).    The Plate Method:  https://www.cdc.gov/diabetes/images/managing/Diabetes-Manage-Eat-Well-Plate-Graphic_600px.jpg    Building a Plate  [email or web address]    Protein Sources for Weight Loss  [email or web address]     Carbohydrates  [email or web address]     Mindful Eating  [email or web address]     Summary of Volumetrics Eating Plan  [email or web address]     Seated Exercises for Arms and Legs (can be done before or after surgery)  [email or web address]    Follow up:    Tuesday, January 30th at 2:00 pm    Aydee Bar ([place]), JOSÉ MIGUEL-D, RD, LD  Clinic #: [phone]

## 2023-12-29 NOTE — LETTER
"12/29/2023       RE: Kimo Greenwood  [address]  [facility] [address]     Dear Colleague,    Thank you for referring your patient, Kimo Greenwood, to the [facility] [place] at [facility]. Please see a copy of my visit note below.    Video-Visit Details    Type of service:  Video Visit    Video Start Time: 2:03 pm  Video End Time: 2:22 pm    Originating Location (pt. Location): Home    Distant Location (provider location):  Offsite (providers home)     Platform used for Video Visit: Snapd App    Nutrition Consultation Note    Reason For Visit: Nutrition Assessment    Kimo Greenwood is a 43 year old presenting today for return nutrition consult.   Pt is interested in laparoscopic sleeve gastrectomy.  Will proceed with MWM at this time per pt/his mother. Did NOT review any surgery dietary guidelines yet.     Pt referred by JERRY Marinelli on January 10, 2023.    Coordination note (if pursuing surgery in future)   Needs baseline labs - Done 1/9/23, would need to be re-done due to being outdated soon  Needs Psych eval  Needs PCP letter of support  40 lb weight loss from initial  Surgeon meet and greet with Dr. Madrigal  Needs letter of support from therapist  Needs letter of support from psychiatrist       CO-MORBIDITIES OF OBESITY INCLUDE:        1/9/2023     1:43 PM   --   I have the following health issues associated with obesity None of the above     PMH:     Per PA note:  \"Hip OA - needing hip replacement.      LOREN - uses CPAP every night. Followed by PCP      Barretts esophagus - has not had any GERD symptoms recently. Well controlled on PPI.      HTN - well controlled on medications      Degenerative disk disease - causes low back pain. 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 Followed by urology\"    Depression - has therapist/psych, hx of suicide attempt per questionnaire     Speech/Language " "delay    SUPPORT:      1/9/2023     1:43 PM   Support System Reviewed With Patient   Who do you have in your support network that can be available to help you for the first 2 weeks after surgery? agency   Who can you count on for support throughout your weight loss surgery journey? fully supportive       ANTHROPOMETRICS:  Consult weight 1/9/23: 407 lbs with BMI 55.28    Estimated body mass index is 52.58 kg/m  as calculated from the following:    Height as of 12/12/23: 1.803 m (5' 11\").    Weight as of 12/12/23: 171 kg (377 lb).     Current weight: 377 lbs, pt report   - Down 30 lbs since consult    Goal weight for hip replacement per Shasha's note: 330 lbs         No data to display                    1/9/2023     1:43 PM   Weight Loss Questions Reviewed With Patient   How long have you been overweight? Since early childhood     MEDICATIONS FOR WEIGHT LOSS:  Wegovy prescribed 1/9/22     Hx   Saxenda -  Had been denied at first but was able to get with appeal per pt  Topiramate (ineffective)    SUPPLEMENT INFORMATION:  Vitamin D3 5,000 international unit(s)/day  B complex    Labs 6/19/23  Vit D WNL  TSH  WNL    Hx of Vit D def    NUTRITION HISTORY:  Pt present today with his mom and legal guardian, Belen.     Patient is considering Bariatric surgery. Hoping to lose weight for hip replacement with goal of 330 lbs. Weight gain due to changes in diet and decrease in activity.     Wants to work on MWM first and consider surgical approach down the line as appropriate.     Worked with a RD a long time ago at [facility] for weight loss.     Mobility is very limited.   Moving into an apartment with a pool next month.     Per PA note (not yet finalized at time of writing this)    Eating 2 meals a day, with snacks. Trying to decrease pop. Meals are provided by group home. However, will go to the store to get candy, chip, cookies, crackers, ice cream, and pudding, and soda. Has increased hunger. Struggles with getting full. " From exam, however [place] have increased hunger,   Breakfast - cereal with milk   Lunch - provided by group home  Dinner - pizza   Snacks - cookies, animal crackers   Pop - 1 liter of regular pop at a time.      Activity - Hip pain limits activity. Will try to get up and walk around as much as possible. Cannot walk more then 50 yards at a time.     Please see previous RD notes for more detail    April 2023:  Using sugar-free flavor packs to help with hydration. Drinking 2-3 water bottles/day.   Also drinking some coffee in the mornings.  Other fluids include: OJ, low fat chocolate milk (about 1/2 gallon), powerade  Also working on buying 6 packs instead of 12 packs of pop.     Started having breakfast more often. Bought some cinnamon special K and made banana pancakes.    Lunch examples chicken salad sandwich, tuna sandwich, pb & j. Yesterday had chicken and some oranges.     Dinner examples mac n cheese, tuscon soup, kilbasa, venison steak with salad and fruit, fajitas with low carb wraps    June 2023:  Continues to progress with dietary goals.   Doing a light OJ now as opposed to regular.     Working on eating more fruits/vegetables.   Snacking on celery/pb, raisins, string cheese wrapped in ham, etc.    Meal examples: grilled chicken with bruschetta topping and red potatoes, chicken fajitas, fruit smoothies    Goes grocery shopping 1x/week and utilizing food shelf 1x/week.    Dec 2023:  Last RD appt in June - scheduling issues on provider/pt end since then (provider out sick, pt not available etc).     Pt present with Jose care helper.     Continues to notice weight loss.    Struggling with giving up pop and chocolate milk - drinking 1-2 bottles/day. On Fridays may get a 52 oz bottle.   Also drinking juice, beer and chocolate milk.     Buys 1/2 gallon of chocolate milk each week.      Ice cream - typically does about 1 quart/week. Tried frozen yogurt but didn't like as much.    Previous goals: Outdated, goals were  set 6 months ago. Made new ones today  1. Aim to get more water flavorings    2. Aim to have 1 single serving of chocolate milk/week -   3. Aim for 2 meals/day   4. Grocery list: yogurt, water flavor packs  5. Consider adding yogurt to smoothie to make it more of a meal and help increase protein intake    Additional information:  Disabled - lives at [facility] - moving Feb 6th to a crisis housing apartment, will have ILS staff     Single         1/9/2023     1:43 PM   Recall Diet Questions Reviewed With Patient   Describe what you typically consume for lunch (typical or most recent) candy soda(8)cans to 10cans a day   Describe what you typically consume for supper (typical or most recent) pizza   How many ounces of water, or other low calorie drinks, do you drink daily (8 oz=1 glass)? 0 oz   How many ounces of caffeine (coffee, tea, pop) do you drink daily (8 oz=1 glass)? 24 oz   How many ounces of milk do you drink daily (8 oz=1 glass) 0 oz   How often do you drink alcohol? Never           1/9/2023     1:43 PM   Eating Habits   Do you have any dietary restrictions? No   Do you currently binge eat (eat a large amount of food in a short time)? Yes   Are you an emotional eater? Yes   Do you get up to eat after falling asleep? Yes           1/9/2023     1:43 PM   Dining Out History Reviewed With Patient   How often do you dine out? Never.   Where do you dine out? (select all that apply) fast food chains   What types of food do you order when you dine out? hamburger       EXERCISE:        1/9/2023     1:43 PM   --   How often do you exercise? Never   What keeps you from being more active? Pain       NUTRITION DIAGNOSIS:  Obesity r/t long history of positive energy balance aeb BMI >30 kg/m2.    INTERVENTION:  Reviewed current dietary habits and pts history   Discussed long-term goals pt hopes to accomplish in RD appointments  Answered pt questions  Coordination of care   Nutrition education -  Plate method, fats, low kcal beverages, starchy vs non-starchy vegetables, sources of carbohydrates, lean protein vs fattier proteins  AVS and handouts via TimePoints          1/9/2023     1:43 PM   Questions Reviewed With Patient   How ready are you to make changes regarding your weight? Number 1 = Not ready at all to make changes up to 10 = very ready. 1   How confident are you that you can change? 1 = Not confident that you will be successful making changes up to 10 = very confident. 1     Expected Engagement: fair-good (good engagement during appt however pt rated confidence as a 1 per questionnaire)     GOALS:  Aim to decrease pop consumption to 1 20 oz bottle/day and 1 30 oz bottle on Fridays and replace with carbonated infused water  Consider [employer] and [employer] for single serve chocolate milk. Aim for 5 8 oz milks/week   Aim for 1 quart of ice cream every other week     Protein shake examples:  Premier Protein (160 Calories, 30 g protein)  Boost/Ensure Max (160 calories, 30 gm protein)   Fairlife Core Power (170 calories, 26 gm protein)  Aldi's Elevation Protein Shake (160 calories, 30 gm protein)   Equate Protein Shake (160 calories, 30 gm protein)  Slim Fast Advanced Nutrition (180 Calories, 20 g protein)  Muscle Milk, lactose-free, 17 oz bottle (210 Calories, 30 g protein)    Ideas to bring on the go:   - Nutrigrain bars   - Almonds   - Tuna    - Snack packs   - Dried fruit (try to find no sugar added)   - Yogurt   - Yogurt covered nuts     Meal ideas or components discussed: honey mustard chicken, baked beans, asparagus, broccoli, chili with beans and beef, white chicken chili with onions/peppers, fish - walleye, salmon, etc., Barilla Protein + pasta, loaded baked potatoe, sweet potatoe breakfast skillet with eggs, tuna salad, tuna casserole     RESOURCES:     Plate method can be used for general guidance on balanced meals/portion sizes (see link below for picture/more information)                 Make 1/2  your plate non starchy vegetables (cauliflower, broccoli, asparagus, [place] sprouts, lettuce, carrots, for example).                5+ oz lean protein sources (salmon/skinless chicken/turkey breast/pork loin/lean cuts of beef/ or non-animal protein such as black, kidney or kuo beans, tofu/edamame/tempeh)     (Note: 3 oz = deck of cards size)                 ~1 cup carbohydrate choices such as whole grain starches or starchy vegetables or fruit. For example: quinoa, brown rice, barley, potatoes, sweet potatoes, winter squash, peas, corn, or fruit.               Choose ~0-2 added fat servings at a meal (avocados, nut butters, nuts or seeds, olive oil, vegetable oils).    The Plate Method:  https://www.cdc.gov/diabetes/images/managing/Diabetes-Manage-Eat-Well-Plate-Graphic_600px.jpg    Building a Plate  [email or web address]    Protein Sources for Weight Loss  [email or web address]     Carbohydrates  [email or web address]     Mindful Eating  [email or web address]     Summary of Volumetrics Eating Plan  [email or web address]     Seated Exercises for Arms and Legs (can be done before or after surgery)  [email or web address]    Follow up:    Tuesday, January 30th at 2:00 pm    Time spent with patient: 19 minutes.  DHEERAJ Israel, RD, LD

## 2023-12-29 NOTE — TELEPHONE ENCOUNTER
Prior Authorization Approval    Medication: WEGOVY 2.4 MG/0.75ML SC SOAJ  Authorization Effective Date: 11/29/2023  Authorization Expiration Date: 12/28/2024  Approved Dose/Quantity: uud   Reference #: Key: [id number]   Insurance Company: [employer] Non-Specialty PA's - Phone [phone] Fax [phone]  Expected CoPay: $    CoPay Card Available:      Financial Assistance Needed:    Which Pharmacy is filling the prescription: [facility] - [place], [place] - [place], [place] - [address]  Pharmacy Notified: Yes  Patient Notified: Yes     Key: [id number]

## 2023-12-29 NOTE — NURSING NOTE
Is the patient currently in the state of [place]? YES    Visit mode:VIDEO    If the visit is dropped, the patient can be reconnected by: VIDEO VISIT: Send to e-mail at: [email or web address]    Will anyone else be joining the visit? NO  (If patient encounters technical issues they should call [phone]    How would you like to obtain your AVS? MyChart    Are changes needed to the allergy or medication list? N/A    Reason for visit: RECHECK    Pt states no more than usual pain today but 7/10 hip and knee pain is normal pain for pt.    Yasmany NAIR

## 2024-01-03 NOTE — IP AVS SNAPSHOT
[address] [address]    Phone:  [phone]                                       After Visit Summary   6/7/2018    Kimo Greenwood    MRN: [id number]           After Visit Summary Signature Page     I have received my discharge instructions, and my questions have been answered. I have discussed any challenges I see with this plan with the nurse or doctor.    ..........................................................................................................................................  Patient/Patient Representative Signature      ..........................................................................................................................................  Patient Representative Print Name and Relationship to Patient    ..................................................               ................................................  Date                                            Time    ..........................................................................................................................................  Reviewed by Signature/Title    ...................................................              ..............................................  Date                                                            Time           Never smoker

## 2024-01-12 DIAGNOSIS — E66.813 CLASS 3 SEVERE OBESITY DUE TO EXCESS CALORIES WITH SERIOUS COMORBIDITY AND BODY MASS INDEX (BMI) OF 50.0 TO 59.9 IN ADULT (H): ICD-10-CM

## 2024-01-12 DIAGNOSIS — E66.01 CLASS 3 SEVERE OBESITY DUE TO EXCESS CALORIES WITH SERIOUS COMORBIDITY AND BODY MASS INDEX (BMI) OF 50.0 TO 59.9 IN ADULT (H): ICD-10-CM

## 2024-01-16 ENCOUNTER — OFFICE VISIT (OUTPATIENT)
Dept: URGENT CARE | Facility: URGENT CARE | Age: 44
End: 2024-01-16
Payer: COMMERCIAL

## 2024-01-16 ENCOUNTER — OFFICE VISIT (OUTPATIENT)
Dept: PEDIATRICS | Facility: CLINIC | Age: 44
End: 2024-01-16
Attending: PHYSICIAN ASSISTANT
Payer: COMMERCIAL

## 2024-01-16 ENCOUNTER — HOSPITAL ENCOUNTER (OUTPATIENT)
Dept: CT IMAGING | Facility: CLINIC | Age: 44
Discharge: HOME | End: 2024-01-16
Attending: NURSE PRACTITIONER | Admitting: NURSE PRACTITIONER
Payer: COMMERCIAL

## 2024-01-16 VITALS
HEART RATE: 91 BPM | OXYGEN SATURATION: 96 % | TEMPERATURE: 98.2 F | BODY MASS INDEX: 49.23 KG/M2 | WEIGHT: 315 LBS | SYSTOLIC BLOOD PRESSURE: 137 MMHG | DIASTOLIC BLOOD PRESSURE: 85 MMHG

## 2024-01-16 VITALS
TEMPERATURE: 98 F | HEART RATE: 98 BPM | DIASTOLIC BLOOD PRESSURE: 77 MMHG | SYSTOLIC BLOOD PRESSURE: 133 MMHG | OXYGEN SATURATION: 96 % | RESPIRATION RATE: 20 BRPM

## 2024-01-16 DIAGNOSIS — R79.89 ELEVATED D-DIMER: ICD-10-CM

## 2024-01-16 DIAGNOSIS — M54.6 BILATERAL THORACIC BACK PAIN, UNSPECIFIED CHRONICITY: ICD-10-CM

## 2024-01-16 DIAGNOSIS — R07.9 ACUTE CHEST PAIN: Primary | ICD-10-CM

## 2024-01-16 DIAGNOSIS — R06.02 SHORTNESS OF BREATH: ICD-10-CM

## 2024-01-16 DIAGNOSIS — R07.89 LEFT CHEST PRESSURE: ICD-10-CM

## 2024-01-16 DIAGNOSIS — R07.89 CHEST PRESSURE: ICD-10-CM

## 2024-01-16 DIAGNOSIS — R06.02 SHORTNESS OF BREATH: Primary | ICD-10-CM

## 2024-01-16 LAB
ANION GAP SERPL CALCULATED.3IONS-SCNC: 12 MMOL/L (ref 7–15)
BUN SERPL-MCNC: 26.4 MG/DL (ref 6–20)
CALCIUM SERPL-MCNC: 9.3 MG/DL (ref 8.6–10)
CHLORIDE SERPL-SCNC: 101 MMOL/L (ref 98–107)
CREAT SERPL-MCNC: 1.18 MG/DL (ref 0.67–1.17)
D DIMER PPP FEU-MCNC: 0.88 UG/ML FEU (ref 0–0.5)
DEPRECATED HCO3 PLAS-SCNC: 27 MMOL/L (ref 22–29)
EGFRCR SERPLBLD CKD-EPI 2021: 79 ML/MIN/1.73M2
ERYTHROCYTE [DISTWIDTH] IN BLOOD BY AUTOMATED COUNT: 12.9 % (ref 10–15)
GLUCOSE SERPL-MCNC: 94 MG/DL (ref 70–99)
HCT VFR BLD AUTO: 38.3 % (ref 40–53)
HGB BLD-MCNC: 12.8 G/DL (ref 13.3–17.7)
MCH RBC QN AUTO: 28.8 PG (ref 26.5–33)
MCHC RBC AUTO-ENTMCNC: 33.4 G/DL (ref 31.5–36.5)
MCV RBC AUTO: 86 FL (ref 78–100)
PLATELET # BLD AUTO: 219 10E3/UL (ref 150–450)
POTASSIUM SERPL-SCNC: 3.7 MMOL/L (ref 3.4–5.3)
RBC # BLD AUTO: 4.44 10E6/UL (ref 4.4–5.9)
SODIUM SERPL-SCNC: 140 MMOL/L (ref 135–145)
TROPONIN T SERPL HS-MCNC: 11 NG/L
WBC # BLD AUTO: 13.7 10E3/UL (ref 4–11)

## 2024-01-16 PROCEDURE — 85027 COMPLETE CBC AUTOMATED: CPT | Performed by: NURSE PRACTITIONER

## 2024-01-16 PROCEDURE — 93000 ELECTROCARDIOGRAM COMPLETE: CPT | Performed by: PHYSICIAN ASSISTANT

## 2024-01-16 PROCEDURE — 71275 CT ANGIOGRAPHY CHEST: CPT

## 2024-01-16 PROCEDURE — 80048 BASIC METABOLIC PNL TOTAL CA: CPT | Performed by: NURSE PRACTITIONER

## 2024-01-16 PROCEDURE — 84484 ASSAY OF TROPONIN QUANT: CPT | Performed by: NURSE PRACTITIONER

## 2024-01-16 PROCEDURE — 99214 OFFICE O/P EST MOD 30 MIN: CPT | Performed by: NURSE PRACTITIONER

## 2024-01-16 PROCEDURE — 250N000009 HC RX 250: Performed by: NURSE PRACTITIONER

## 2024-01-16 PROCEDURE — 99207 REFERRAL TO ACUTE AND DIAGNOSTIC SERVICES: CPT | Performed by: PHYSICIAN ASSISTANT

## 2024-01-16 PROCEDURE — 85379 FIBRIN DEGRADATION QUANT: CPT | Performed by: NURSE PRACTITIONER

## 2024-01-16 PROCEDURE — 36415 COLL VENOUS BLD VENIPUNCTURE: CPT | Performed by: NURSE PRACTITIONER

## 2024-01-16 PROCEDURE — 250N000011 HC RX IP 250 OP 636: Performed by: NURSE PRACTITIONER

## 2024-01-16 RX ORDER — IOPAMIDOL 755 MG/ML
500 INJECTION, SOLUTION INTRAVASCULAR ONCE
Status: COMPLETED | OUTPATIENT
Start: 2024-01-16 | End: 2024-01-16

## 2024-01-16 RX ADMIN — IOPAMIDOL 77 ML: 755 INJECTION, SOLUTION INTRAVENOUS at 16:18

## 2024-01-16 RX ADMIN — SODIUM CHLORIDE 90 ML: 9 INJECTION, SOLUTION INTRAVENOUS at 16:18

## 2024-01-16 NOTE — PROGRESS NOTES
Assessment & Plan     Acute chest pain    EKG   - EKG 12-lead complete w/read - Clinics  - EKG 12-lead complete w/read - Clinics    Bilateral thoracic back pain, unspecified chronicity    Back pain is extremely common.  Its important to avoid using heating pads, but alternating ice and warm moist compresses are helpful.  Its usually best to try to return to your daily routine as soon as possible.  Stretching and walking to help relieve your discomfort is helpful.  Many times back pain and muscle strains will worsen for the first 3-4 days and then settle down the following 3 to 4 days.  Take medication as prescribed and pay attention as some medications can cause drowsiness.  Over time you will want to slowly increase the amount of time you walk or stretch.   Expect discomfort when you first start, but this should improved as your back starts to recover and become stronger.  Use pain as your guide, if it hurts you are not ready for that activity.  Work back into activity gradually and return to activity as tolerated.        Chest pressure    Patient is experiencing some chest discomfort and pressure  Due to atypical chest pain and pain radiating to the back   I have referred pt to the [facility] for CT scan and troponin      Review of external notes as documented elsewhere in note      BMI:   Estimated body mass index is 49.23 kg/m  as calculated from the following:    Height as of 12/29/23: 1.829 m (6').    Weight as of 12/29/23: 164.7 kg (363 lb).       CONSULTATION/REFERRAL to [facility]    No follow-ups on file.    Panda Juarez, [facility], PA-C  [facility] KRISTEN Lamar is a 43 year old, presenting for the following health issues:  Chest Pain (Chest pain since last night )      HPI   Review of Systems   Constitutional, HEENT, cardiovascular, pulmonary, GI, , musculoskeletal, neuro, skin, endocrine and psych systems are negative, except as otherwise noted.      Objective    /77   Pulse 98    Temp 98  F (36.7  C) (Tympanic)   Resp 20   SpO2 96%   There is no height or weight on file to calculate BMI.  Physical Exam   GENERAL: healthy, alert and no distress  EYES: Eyes grossly normal to inspection, PERRL and conjunctivae and sclerae normal  HENT: ear canals and TM's normal, nose and mouth without ulcers or lesions  NECK: no adenopathy, no asymmetry, masses, or scars and thyroid normal to palpation  RESP: lungs clear to auscultation - no rales, rhonchi or wheezes  CV: regular rate and rhythm, normal S1 S2, no S3 or S4, no murmur, click or rub, no peripheral edema and peripheral pulses strong  ABDOMEN: soft, nontender, no hepatosplenomegaly, no masses and bowel sounds normal  MS: Pos for anterior chest pressure and posterior back tenderness  SKIN: no suspicious lesions or rashes  NEURO: Normal strength and tone, mentation intact and speech normal  PSYCH: mentation appears normal, affect normal/bright

## 2024-01-16 NOTE — PROGRESS NOTES
Assessment & Plan     Shortness of breath  - sodium chloride (PF) 0.9% PF flush 3 mL  - CBC with platelets; Future  - Basic metabolic panel; Future  - D dimer quantitative; Future  - Troponin T, High Sensitivity; Future  - CBC with platelets  - Basic metabolic panel  - D dimer quantitative  - Troponin T, High Sensitivity  - CT Chest Pulmonary Embolism w Contrast; Future    Left chest pressure  - sodium chloride (PF) 0.9% PF flush 3 mL  - CBC with platelets; Future  - Basic metabolic panel; Future  - D dimer quantitative; Future  - Troponin T, High Sensitivity; Future  - CBC with platelets  - Basic metabolic panel  - D dimer quantitative  - Troponin T, High Sensitivity  - CT Chest Pulmonary Embolism w Contrast; Future    Elevated d-dimer  - CT Chest Pulmonary Embolism w Contrast; Future    Patient Instructions     Results for orders placed or performed during the hospital encounter of 01/16/24   CT Chest Pulmonary Embolism w Contrast     Status: None    Narrative    EXAM: CT CHEST PULMONARY EMBOLISM W CONTRAST  LOCATION: [facility]  DATE: 1/16/2024    INDICATION:  Shortness of breath, Left chest pressure, Elevated d-dimer  COMPARISON: CT 09/01/2023, 3/23/2022 and 2/18/2022  TECHNIQUE: CT chest pulmonary angiogram during arterial phase injection of IV contrast. Multiplanar reformats and MIP reconstructions were performed. Dose reduction techniques were used.   CONTRAST: 77mL Isovue 370    FINDINGS:  ANGIOGRAM CHEST: Pulmonary arteries are normal caliber and negative for pulmonary emboli. Thoracic aorta is negative for dissection. No CT evidence of right heart strain.    LUNGS AND PLEURA: Trace bilateral pleural fluid with adjacent atelectasis. Mild bronchial wall thickening with mild mosaic lung attenuation suggesting mild air trapping. No pneumonic infiltrate.    MEDIASTINUM/AXILLAE: Normal heart size. Trace pericardial fluid.    CORONARY ARTERY CALCIFICATION: None.    UPPER ABDOMEN: Mild  splenomegaly. Stable 10.6 cm left adrenal myelolipoma.    MUSCULOSKELETAL: Spinal degenerative changes.      Impression    IMPRESSION:  1.  No pulmonary artery embolism.  2.  Mild bronchiolitis with mild bronchial wall thickening and pulmonary air trapping. No pneumonic infiltrate.  3.  Trace bilateral pleural fluid with adjacent atelectasis.  4.  Small pericardial effusion.  5.  Mild splenomegaly and stable large left adrenal myelolipoma.   Results for orders placed or performed in visit on 01/16/24   CBC with platelets     Status: Abnormal   Result Value Ref Range    WBC Count 13.7 (H) 4.0 - 11.0 10e3/uL    RBC Count 4.44 4.40 - 5.90 10e6/uL    Hemoglobin 12.8 (L) 13.3 - 17.7 g/dL    Hematocrit 38.3 (L) 40.0 - 53.0 %    MCV 86 78 - 100 fL    MCH 28.8 26.5 - 33.0 pg    MCHC 33.4 31.5 - 36.5 g/dL    RDW 12.9 10.0 - 15.0 %    Platelet Count 219 150 - 450 10e3/uL   Basic metabolic panel     Status: Abnormal   Result Value Ref Range    Sodium 140 135 - 145 mmol/L    Potassium 3.7 3.4 - 5.3 mmol/L    Chloride 101 98 - 107 mmol/L    Carbon Dioxide (CO2) 27 22 - 29 mmol/L    Anion Gap 12 7 - 15 mmol/L    Urea Nitrogen 26.4 (H) 6.0 - 20.0 mg/dL    Creatinine 1.18 (H) 0.67 - 1.17 mg/dL    GFR Estimate 79 >60 mL/min/1.73m2    Calcium 9.3 8.6 - 10.0 mg/dL    Glucose 94 70 - 99 mg/dL   D dimer quantitative     Status: Abnormal   Result Value Ref Range    D-Dimer Quantitative 0.88 (H) 0.00 - 0.50 ug/mL FEU    Narrative    This D-dimer assay is intended for use in conjunction with a clinical pretest probability assessment model to exclude pulmonary embolism (PE) and deep venous thrombosis (DVT) in outpatients suspected of PE or DVT. The cut-off value is 0.50 ug/mL FEU.   Troponin T, High Sensitivity     Status: Normal   Result Value Ref Range    Troponin T, High Sensitivity 11 <=22 ng/L     The troponin is normal but the  d dimer is lightly elevated.    The chest CT shows no pulmonary embolus or pneumonia infiltrate.    There is  a small amount of fluid around your heart.  If this shortness of breath continues or gets any worse I want you to go to the ER.            BMI  Estimated body mass index is 49.23 kg/m  as calculated from the following:    Height as of 12/29/23: 1.829 m (6').    Weight as of this encounter: 164.7 kg (363 lb).         Return in about 2 days (around 1/18/2024) for with regular provider if symptoms persist.    Carline Lamar is a 43 year old, presenting for the following health issues:  Chest Pain    HPI     Chest Pain  Onset/Duration: Last night  Description:   Location: left side  Character: sharp  Radiation: Yesterday had back pain too  Duration: constant   Intensity: moderate, 7/10  Progression of Symptoms: same and constant  Accompanying Signs & Symptoms:  Shortness of breath: No  Sweating: YES  Nausea/vomiting: No  Lightheadedness: No  Palpitations: No  Fever/Chills: No  Cough: No           Heartburn: No, is on meds for Patel's esophagus (last EGD 11/29/23)  History:   Family history of heart disease: No  Tobacco use: YES  Previous similar symptoms: no   Precipitating factors:   Worse with exertion: No  Worse with deep breaths: No           Related to eating: No           Better with burping: No  Alleviating factors: N/A  Therapies tried and outcome: nothing        Review of Systems   Constitutional, HEENT, cardiovascular, pulmonary, GI, , musculoskeletal, neuro, skin, endocrine and psych systems are negative, except as otherwise noted.      Objective    /85 (Patient Position: Sitting)   Pulse 91   Temp 98.2  F (36.8  C) (Oral)   Wt (!) 164.7 kg (363 lb)   SpO2 96%   BMI 49.23 kg/m    Body mass index is 49.23 kg/m .  Physical Exam   GENERAL: alert and no distress  NECK: no adenopathy, no asymmetry, masses, or scars  RESP: lungs clear to auscultation - no rales, rhonchi or wheezes  CV: regular rate and rhythm, normal S1 S2, no S3 or S4, no murmur, click or rub, no peripheral edema  ABDOMEN: soft,  nontender, no hepatosplenomegaly, no masses and bowel sounds normal  MS: no gross musculoskeletal defects noted, no edema  SKIN: no suspicious lesions or rashes

## 2024-01-16 NOTE — PATIENT INSTRUCTIONS
Results for orders placed or performed during the hospital encounter of 01/16/24   CT Chest Pulmonary Embolism w Contrast     Status: None    Narrative    EXAM: CT CHEST PULMONARY EMBOLISM W CONTRAST  LOCATION: [facility]  DATE: 1/16/2024    INDICATION:  Shortness of breath, Left chest pressure, Elevated d-dimer  COMPARISON: CT 09/01/2023, 3/23/2022 and 2/18/2022  TECHNIQUE: CT chest pulmonary angiogram during arterial phase injection of IV contrast. Multiplanar reformats and MIP reconstructions were performed. Dose reduction techniques were used.   CONTRAST: 77mL Isovue 370    FINDINGS:  ANGIOGRAM CHEST: Pulmonary arteries are normal caliber and negative for pulmonary emboli. Thoracic aorta is negative for dissection. No CT evidence of right heart strain.    LUNGS AND PLEURA: Trace bilateral pleural fluid with adjacent atelectasis. Mild bronchial wall thickening with mild mosaic lung attenuation suggesting mild air trapping. No pneumonic infiltrate.    MEDIASTINUM/AXILLAE: Normal heart size. Trace pericardial fluid.    CORONARY ARTERY CALCIFICATION: None.    UPPER ABDOMEN: Mild splenomegaly. Stable 10.6 cm left adrenal myelolipoma.    MUSCULOSKELETAL: Spinal degenerative changes.      Impression    IMPRESSION:  1.  No pulmonary artery embolism.  2.  Mild bronchiolitis with mild bronchial wall thickening and pulmonary air trapping. No pneumonic infiltrate.  3.  Trace bilateral pleural fluid with adjacent atelectasis.  4.  Small pericardial effusion.  5.  Mild splenomegaly and stable large left adrenal myelolipoma.   Results for orders placed or performed in visit on 01/16/24   CBC with platelets     Status: Abnormal   Result Value Ref Range    WBC Count 13.7 (H) 4.0 - 11.0 10e3/uL    RBC Count 4.44 4.40 - 5.90 10e6/uL    Hemoglobin 12.8 (L) 13.3 - 17.7 g/dL    Hematocrit 38.3 (L) 40.0 - 53.0 %    MCV 86 78 - 100 fL    MCH 28.8 26.5 - 33.0 pg    MCHC 33.4 31.5 - 36.5 g/dL    RDW 12.9 10.0 -  15.0 %    Platelet Count 219 150 - 450 10e3/uL   Basic metabolic panel     Status: Abnormal   Result Value Ref Range    Sodium 140 135 - 145 mmol/L    Potassium 3.7 3.4 - 5.3 mmol/L    Chloride 101 98 - 107 mmol/L    Carbon Dioxide (CO2) 27 22 - 29 mmol/L    Anion Gap 12 7 - 15 mmol/L    Urea Nitrogen 26.4 (H) 6.0 - 20.0 mg/dL    Creatinine 1.18 (H) 0.67 - 1.17 mg/dL    GFR Estimate 79 >60 mL/min/1.73m2    Calcium 9.3 8.6 - 10.0 mg/dL    Glucose 94 70 - 99 mg/dL   D dimer quantitative     Status: Abnormal   Result Value Ref Range    D-Dimer Quantitative 0.88 (H) 0.00 - 0.50 ug/mL FEU    Narrative    This D-dimer assay is intended for use in conjunction with a clinical pretest probability assessment model to exclude pulmonary embolism (PE) and deep venous thrombosis (DVT) in outpatients suspected of PE or DVT. The cut-off value is 0.50 ug/mL FEU.   Troponin T, High Sensitivity     Status: Normal   Result Value Ref Range    Troponin T, High Sensitivity 11 <=22 ng/L     The troponin is normal but the  d dimer is lightly elevated.    The chest CT shows no pulmonary embolus or pneumonia infiltrate.    There is a small amount of fluid around your heart.  If this shortness of breath continues or gets any worse I want you to go to the ER.

## 2024-01-17 NOTE — TELEPHONE ENCOUNTER
Semaglutide-Weight Management (WEGOVY) 2.4 MG/0.75ML pen   3 mL 1 12/22/2023     Last Office Visit : 12-  Future Office visit:  2-      Labs completed on :1-  Creatinine  0.67 - 1.17 mg/dL 1.18

## 2024-01-30 ENCOUNTER — VIRTUAL VISIT (OUTPATIENT)
Dept: ENDOCRINOLOGY | Facility: CLINIC | Age: 44
End: 2024-01-30
Payer: COMMERCIAL

## 2024-01-30 VITALS — HEIGHT: 72 IN | BODY MASS INDEX: 49.22 KG/M2

## 2024-01-30 DIAGNOSIS — E66.9 OBESITY: ICD-10-CM

## 2024-01-30 DIAGNOSIS — Z71.3 NUTRITIONAL COUNSELING: Primary | ICD-10-CM

## 2024-01-30 PROCEDURE — 97803 MED NUTRITION INDIV SUBSEQ: CPT | Mod: 95 | Performed by: DIETITIAN, REGISTERED

## 2024-01-30 PROCEDURE — 99207 PR NO CHARGE LOS: CPT | Mod: 95 | Performed by: DIETITIAN, REGISTERED

## 2024-01-30 NOTE — PROGRESS NOTES
"Video-Visit Details    Type of service:  Video Visit    Video Start Time: 2:10 pm  Video End Time: 2:26 pm    Originating Location (pt. Location): Home    Distant Location (provider location):  Offsite (providers home)     Platform used for Video Visit: [facility]    Nutrition Consultation Note    Reason For Visit: Nutrition Assessment    Kimo Greenwood is a 43 year old presenting today for return nutrition consult.   Pt is interested in laparoscopic sleeve gastrectomy.  Will proceed with MWM at this time per pt/his mother. Did NOT review any surgery dietary guidelines yet.     Pt referred by JERRY Marinelli on January 10, 2023.    Coordination note (if pursuing surgery in future)   Needs baseline labs - Done 1/9/23, would need to be re-done due to being outdated   Needs Psych eval  Needs PCP letter of support  40 lb weight loss from initial  Surgeon meet and greet with Dr. Madrigal  Needs letter of support from therapist  Needs letter of support from psychiatrist       CO-MORBIDITIES OF OBESITY INCLUDE:        1/9/2023     1:43 PM   --   I have the following health issues associated with obesity None of the above     PMH:     Per PA note:  \"Hip OA - needing hip replacement.      LOREN - uses CPAP every night. Followed by PCP      Barretts esophagus - has not had any GERD symptoms recently. Well controlled on PPI.      HTN - well controlled on medications      Degenerative disk disease - causes low back pain. 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 Followed by urology\"    Depression - has therapist/psych, hx of suicide attempt per questionnaire     Speech/Language delay    SUPPORT:      1/9/2023     1:43 PM   Support System Reviewed With Patient   Who do you have in your support network that can be available to help you for the first 2 weeks after surgery? agency   Who can you count on for support throughout your weight loss surgery journey? fully supportive       ANTHROPOMETRICS:  Consult weight " "1/9/23: 407 lbs with BMI 55.28    Estimated body mass index is 49.22 kg/m  as calculated from the following:    Height as of this encounter: 1.829 m (6' 0.01\").    Weight as of 1/16/24: 164.7 kg (363 lb).     Current weight: 363 lbs, pt report   - Down 44 lbs since consult    Goal weight for hip replacement per Nya Camarena's note: 330 lbs         No data to display                    1/9/2023     1:43 PM   Weight Loss Questions Reviewed With Patient   How long have you been overweight? Since early childhood     MEDICATIONS FOR WEIGHT LOSS:  Wegovy prescribed 1/9/22 - Typically takes Wednesdays per pt. If forgets will do Thursday. Last week took Friday    Hx   Saxenda -  Had been denied at first but was able to get with appeal per pt  Topiramate (ineffective)    SUPPLEMENT INFORMATION:  Vitamin D3 5,000 international unit(s)/day  B complex    Labs 6/19/23  Vit D WNL  TSH  WNL    Hx of Vit D def    NUTRITION HISTORY:  Pt present today with his mom and legal guardian, Belen.     Patient is considering Bariatric surgery. Hoping to lose weight for hip replacement with goal of 330 lbs. Weight gain due to changes in diet and decrease in activity.     Wants to work on MWM first and consider surgical approach down the line as appropriate.     Worked with a RD a long time ago at [facility] for weight loss.     Mobility is very limited.   Moving into an apartment with a pool next month.     Per PA note (not yet finalized at time of writing this)    Eating 2 meals a day, with snacks. Trying to decrease pop. Meals are provided by group home. However, will go to the store to get candy, chip, cookies, crackers, ice cream, and pudding, and soda. Has increased hunger. Struggles with getting full. From exam, however [place] have increased hunger,   Breakfast - cereal with milk   Lunch - provided by group home  Dinner - pizza   Snacks - cookies, animal crackers   Pop - 1 liter of regular pop at a time.      Activity - Hip pain limits " activity. Will try to get up and walk around as much as possible. Cannot walk more then 50 yards at a time.     Please see previous RD notes for more detail    Dec 2023:  Last RD appt in June - scheduling issues on provider/pt end since then (provider out sick, pt not available etc).     Pt present with Jose care helper.     Continues to notice weight loss.    Struggling with giving up pop and chocolate milk - drinking 1-2 bottles/day. On Fridays may get a 52 oz bottle.   Also drinking juice and beer.  Buys 1/2 gallon of chocolate milk each week.      Ice cream - typically does about 1 quart/week. Tried frozen yogurt but didn't like as much.    Jan 2024:    Pt wanted to discuss how to manage diarrhea.   Deferred on discussing other goals - will carry over to next month.    BM: diarrhea currently - started Sunday. taking generic imodium     Previous goals (will discuss next appt - not feeling well today)  Aim to decrease pop consumption to 1 20 oz bottle/day and 1 30 oz bottle on Fridays and replace with carbonated infused water  Consider [employer] and [employer] for single serve chocolate milk. Aim for 5 8 oz milks/week   Aim for 1 quart of ice cream every other week     Additional information:  Disabled     Single         1/9/2023     1:43 PM   Recall Diet Questions Reviewed With Patient   Describe what you typically consume for lunch (typical or most recent) candy soda(8)cans to 10cans a day   Describe what you typically consume for supper (typical or most recent) pizza   How many ounces of water, or other low calorie drinks, do you drink daily (8 oz=1 glass)? 0 oz   How many ounces of caffeine (coffee, tea, pop) do you drink daily (8 oz=1 glass)? 24 oz   How many ounces of milk do you drink daily (8 oz=1 glass) 0 oz   How often do you drink alcohol? Never           1/9/2023     1:43 PM   Eating Habits   Do you have any dietary restrictions? No   Do you currently binge eat (eat a large amount of food in a short time)?  Yes   Are you an emotional eater? Yes   Do you get up to eat after falling asleep? Yes           1/9/2023     1:43 PM   Dining Out History Reviewed With Patient   How often do you dine out? Never.   Where do you dine out? (select all that apply) fast food chains   What types of food do you order when you dine out? hamburger       EXERCISE:        1/9/2023     1:43 PM   --   How often do you exercise? Never   What keeps you from being more active? Pain       NUTRITION DIAGNOSIS:  Obesity r/t long history of positive energy balance aeb BMI >30 kg/m2.    INTERVENTION:  Reviewed current dietary habits and pts history   Discussed long-term goals pt hopes to accomplish in RD appointments  Answered pt questions  Coordination of care   Nutrition education - Plate method, fats, low kcal beverages, starchy vs non-starchy vegetables, sources of carbohydrates, lean protein vs fattier proteins  AVS and handouts via [employer]          1/9/2023     1:43 PM   Questions Reviewed With Patient   How ready are you to make changes regarding your weight? Number 1 = Not ready at all to make changes up to 10 = very ready. 1   How confident are you that you can change? 1 = Not confident that you will be successful making changes up to 10 = very confident. 1     Expected Engagement: fair-good (good engagement during appts however pt rated confidence as a 1 per questionnaire)     GOALS:  Recommend replenishing with electrolytes when having diarrhea (Propel, Pedialyte, Gatorade/Powerade (low sugar or sugar-free)  Recommend increasing fiber in diet to help with diarrhea (carrots, banana, oatmeal)   Could try cheese/dairy to see if that helps with diarrhea (could worsen depending what s causing diarrhea)   Could consider a probiotic or fermented foods to see if it helps with diarrhea (mario, simon)     Soluble fiber is recommended for both diarrhea/constipation.  Foods rich in insoluble fiber are best for constipation but not diarrhea.   *I  think I misspoke and said this opposite when we talked - sorry!    Insoluble fiber adds bulk to your stool and helps food pass more quickly through the stomach and intestines. It can have a laxative effect  Soluble fiber attracts water and forms a gel-like substance with food as it s digested. Aids in diarrhea by helping pull water.     Insoluble fiber examples: wheat bran, brown rice, flaxseed, brady seed, cauliflower, zuchinni, green beans, dark leafy greens, carrots, beets, radishes, fruit skins, intact whole grains, beans/peas    Soluble fiber examples: brussel sprouts, oats, beans, peas, apples, pears, berries, carrots, sweet potatoes, oranges, nuts, seeds    *Note: some food are rich in both soluble and insoluble fiber      Fermented foods   Yogurt with live bacterial cultures   Marly   Apple cider Vinegar   Miso   Natto   Kvass   Pickles   Olives   Kefir   Aftabmbuchrosey Pereyra   Probiotics: lactobacillus or acidophilus    Goals from last month:  Aim to decrease pop consumption to 1 20 oz bottle/day and 1 30 oz bottle on Fridays and replace with carbonated infused water  Consider [employer] and [employer] for single serve chocolate milk. Aim for 5 8 oz milks/week   Aim for 1 quart of ice cream every other week     Protein shake examples:  Premier Protein (160 Calories, 30 g protein)  Boost/Ensure Max (160 calories, 30 gm protein)   Fairlife Core Power (170 calories, 26 gm protein)  Aldi's Elevation Protein Shake (160 calories, 30 gm protein)   Equate Protein Shake (160 calories, 30 gm protein)  Slim Fast Advanced Nutrition (180 Calories, 20 g protein)  Muscle Milk, lactose-free, 17 oz bottle (210 Calories, 30 g protein)    Ideas to bring on the go:   - Nutrigrain bars   - Almonds   - Tuna    - Snack packs   - Dried fruit (try to find no sugar added)   - Yogurt   - Yogurt covered nuts     Meal ideas or components discussed: honey mustard chicken, baked beans, asparagus, broccoli, chili with beans and beef, white  chicken chili with onions/peppers, fish - walleye, salmon, etc., Barilla Protein + pasta, loaded baked potatoe, sweet potatoe breakfast skillet with eggs, tuna salad, tuna casserole     RESOURCES:     Plate method can be used for general guidance on balanced meals/portion sizes (see link below for picture/more information)                 Make 1/2 your plate non starchy vegetables (cauliflower, broccoli, asparagus, [place] sprouts, lettuce, carrots, for example).                5+ oz lean protein sources (salmon/skinless chicken/turkey breast/pork loin/lean cuts of beef/ or non-animal protein such as black, kidney or kuo beans, tofu/edamame/tempeh)     (Note: 3 oz = deck of cards size)                 ~1 cup carbohydrate choices such as whole grain starches or starchy vegetables or fruit. For example: quinoa, brown rice, barley, potatoes, sweet potatoes, winter squash, peas, corn, or fruit.               Choose ~0-2 added fat servings at a meal (avocados, nut butters, nuts or seeds, olive oil, vegetable oils).    The Plate Method:  https://www.cdc.gov/diabetes/images/managing/Diabetes-Manage-Eat-Well-Plate-Graphic_600px.jpg    Building a Plate  [email or web address]    Protein Sources for Weight Loss  [email or web address]     Carbohydrates  [email or web address]     Mindful Eating  [email or web address]     Summary of Volumetrics Eating Plan  [email or web address]     Seated Exercises for Arms and Legs (can be done before or after surgery)  [email or web address]    Follow up:    Monday, February 26th at 2:30 pm    Time spent with patient: 16 minutes.  DHEERAJ Israel, RD, LD

## 2024-01-30 NOTE — PATIENT INSTRUCTIONS
GOALS:  Recommend replenishing with electrolytes when having diarrhea (Propel, Pedialyte, Gatorade/Powerade (low sugar or sugar-free)  Recommend increasing fiber in diet to help with diarrhea (carrots, banana, oatmeal)   Could try cheese/dairy to see if that helps with diarrhea (could worsen depending what s causing diarrhea)   Could consider a probiotic or fermented foods to see if it helps with diarrhea (simon martin)     Soluble fiber is recommended for both diarrhea/constipation.  Foods rich in insoluble fiber are best for constipation but not diarrhea.   *I think I misspoke and said this opposite when we talked - sorry!    Insoluble fiber adds bulk to your stool and helps food pass more quickly through the stomach and intestines. It can have a laxative effect  Soluble fiber attracts water and forms a gel-like substance with food as it s digested. Aids in diarrhea by helping pull water.     Insoluble fiber examples: wheat bran, brown rice, flaxseed, brady seed, cauliflower, zuchinni, green beans, dark leafy greens, carrots, beets, radishes, fruit skins, intact whole grains, beans/peas    Soluble fiber examples: brussel sprouts, oats, beans, peas, apples, pears, berries, carrots, sweet potatoes, oranges, nuts, seeds    *Note: some food are rich in both soluble and insoluble fiber      Fermented foods   Yogurt with live bacterial cultures   Marly   Apple cider Vinegar   Miso   Natto   Kvass   Pickles   Olives   Kefir   Yrn Pereyra   Probiotics: lactobacillus or acidophilus    Goals from last month:  Aim to decrease pop consumption to 1 20 oz bottle/day and 1 30 oz bottle on Fridays and replace with carbonated infused water  Consider [employer] and [employer] for single serve chocolate milk. Aim for 5 8 oz milks/week   Aim for 1 quart of ice cream every other week     Protein shake examples:  Premier Protein (160 Calories, 30 g protein)  Boost/Ensure Max (160 calories, 30 gm protein)   Fairlife Core Power  (170 calories, 26 gm protein)  Aldi's Elevation Protein Shake (160 calories, 30 gm protein)   Equate Protein Shake (160 calories, 30 gm protein)  Slim Fast Advanced Nutrition (180 Calories, 20 g protein)  Muscle Milk, lactose-free, 17 oz bottle (210 Calories, 30 g protein)    Ideas to bring on the go:   - Nutrigrain bars   - Almonds   - Tuna    - Snack packs   - Dried fruit (try to find no sugar added)   - Yogurt   - Yogurt covered nuts     Meal ideas or components discussed: honey mustard chicken, baked beans, asparagus, broccoli, chili with beans and beef, white chicken chili with onions/peppers, fish - walleye, salmon, etc., Barilla Protein + pasta, loaded baked potatoe, sweet potatoe breakfast skillet with eggs, tuna salad, tuna casserole     RESOURCES:     Plate method can be used for general guidance on balanced meals/portion sizes (see link below for picture/more information)                 Make 1/2 your plate non starchy vegetables (cauliflower, broccoli, asparagus, [place] sprouts, lettuce, carrots, for example).                5+ oz lean protein sources (salmon/skinless chicken/turkey breast/pork loin/lean cuts of beef/ or non-animal protein such as black, kidney or kuo beans, tofu/edamame/tempeh)     (Note: 3 oz = deck of cards size)                 ~1 cup carbohydrate choices such as whole grain starches or starchy vegetables or fruit. For example: quinoa, brown rice, barley, potatoes, sweet potatoes, winter squash, peas, corn, or fruit.               Choose ~0-2 added fat servings at a meal (avocados, nut butters, nuts or seeds, olive oil, vegetable oils).    The Plate Method:  https://www.cdc.gov/diabetes/images/managing/Diabetes-Manage-Eat-Well-Plate-Graphic_600px.jpg    Building a Plate  [email or web address]    Protein Sources for Weight Loss  [email or web address]     Carbohydrates  [email or web address]     Mindful Eating  [email or web address]     Summary  of Volumetrics Eating Plan  [email or web address]     Seated Exercises for Arms and Legs (can be done before or after surgery)  [email or web address]    Follow up:    Monday, February 26th at 2:30 pm    DHEERAJ Chinchilla ([place]), RD, LD  Clinic #: [phone]

## 2024-01-30 NOTE — LETTER
"1/30/2024       RE: Kimo Greenwood  [address]  [facility] [address]     Dear Colleague,    Thank you for referring your patient, Kimo Greenwood, to the [facility] [place] at [facility]. Please see a copy of my visit note below.    Video-Visit Details    Type of service:  Video Visit    Video Start Time: 2:10 pm  Video End Time: 2:26 pm    Originating Location (pt. Location): Home    Distant Location (provider location):  Offsite (providers home)     Platform used for Video Visit: AMDL    Nutrition Consultation Note    Reason For Visit: Nutrition Assessment    Kimo Greenwood is a 43 year old presenting today for return nutrition consult.   Pt is interested in laparoscopic sleeve gastrectomy.  Will proceed with MWM at this time per pt/his mother. Did NOT review any surgery dietary guidelines yet.     Pt referred by JERRY Marinelli on January 10, 2023.    Coordination note (if pursuing surgery in future)   Needs baseline labs - Done 1/9/23, would need to be re-done due to being outdated   Needs Psych eval  Needs PCP letter of support  40 lb weight loss from initial  Surgeon meet and greet with Dr. Madrigal  Needs letter of support from therapist  Needs letter of support from psychiatrist       CO-MORBIDITIES OF OBESITY INCLUDE:        1/9/2023     1:43 PM   --   I have the following health issues associated with obesity None of the above     PMH:     Per PA note:  \"Hip OA - needing hip replacement.      LOREN - uses CPAP every night. Followed by PCP      Barretts esophagus - has not had any GERD symptoms recently. Well controlled on PPI.      HTN - well controlled on medications      Degenerative disk disease - causes low back pain. 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 Followed by urology\"    Depression - has therapist/psych, hx of suicide attempt per questionnaire     Speech/Language " "delay    SUPPORT:      1/9/2023     1:43 PM   Support System Reviewed With Patient   Who do you have in your support network that can be available to help you for the first 2 weeks after surgery? agency   Who can you count on for support throughout your weight loss surgery journey? fully supportive       ANTHROPOMETRICS:  Consult weight 1/9/23: 407 lbs with BMI 55.28    Estimated body mass index is 49.22 kg/m  as calculated from the following:    Height as of this encounter: 1.829 m (6' 0.01\").    Weight as of 1/16/24: 164.7 kg (363 lb).     Current weight: 363 lbs, pt report   - Down 44 lbs since consult    Goal weight for hip replacement per Nya Camarena's note: 330 lbs         No data to display                    1/9/2023     1:43 PM   Weight Loss Questions Reviewed With Patient   How long have you been overweight? Since early childhood     MEDICATIONS FOR WEIGHT LOSS:  Wegovy prescribed 1/9/22 - Typically takes Wednesdays per pt. If forgets will do Thursday. Last week took Friday    Hx   Saxenda -  Had been denied at first but was able to get with appeal per pt  Topiramate (ineffective)    SUPPLEMENT INFORMATION:  Vitamin D3 5,000 international unit(s)/day  B complex    Labs 6/19/23  Vit D WNL  TSH  WNL    Hx of Vit D def    NUTRITION HISTORY:  Pt present today with his mom and legal guardian, Belen.     Patient is considering Bariatric surgery. Hoping to lose weight for hip replacement with goal of 330 lbs. Weight gain due to changes in diet and decrease in activity.     Wants to work on MWM first and consider surgical approach down the line as appropriate.     Worked with a RD a long time ago at [facility] for weight loss.     Mobility is very limited.   Moving into an apartment with a pool next month.     Per PA note (not yet finalized at time of writing this)    Eating 2 meals a day, with snacks. Trying to decrease pop. Meals are provided by group home. However, will go to the store to get candy, chip, cookies, " crackers, ice cream, and pudding, and soda. Has increased hunger. Struggles with getting full. From exam, however [place] have increased hunger,   Breakfast - cereal with milk   Lunch - provided by group home  Dinner - pizza   Snacks - cookies, animal crackers   Pop - 1 liter of regular pop at a time.      Activity - Hip pain limits activity. Will try to get up and walk around as much as possible. Cannot walk more then 50 yards at a time.     Please see previous RD notes for more detail    Dec 2023:  Last RD appt in June - scheduling issues on provider/pt end since then (provider out sick, pt not available etc).     Pt present with Jose care helper.     Continues to notice weight loss.    Struggling with giving up pop and chocolate milk - drinking 1-2 bottles/day. On Fridays may get a 52 oz bottle.   Also drinking juice and beer.  Buys 1/2 gallon of chocolate milk each week.      Ice cream - typically does about 1 quart/week. Tried frozen yogurt but didn't like as much.    Jan 2024:    Pt wanted to discuss how to manage diarrhea.   Deferred on discussing other goals - will carry over to next month.    BM: diarrhea currently - started Sunday. taking generic imodium     Previous goals (will discuss next appt - not feeling well today)  Aim to decrease pop consumption to 1 20 oz bottle/day and 1 30 oz bottle on Fridays and replace with carbonated infused water  Consider Mukesh's and [employer] for single serve chocolate milk. Aim for 5 8 oz milks/week   Aim for 1 quart of ice cream every other week     Additional information:  Disabled     Single         1/9/2023     1:43 PM   Recall Diet Questions Reviewed With Patient   Describe what you typically consume for lunch (typical or most recent) candy soda(8)cans to 10cans a day   Describe what you typically consume for supper (typical or most recent) pizza   How many ounces of water, or other low calorie drinks, do you drink daily (8 oz=1 glass)? 0 oz   How many ounces of caffeine  (coffee, tea, pop) do you drink daily (8 oz=1 glass)? 24 oz   How many ounces of milk do you drink daily (8 oz=1 glass) 0 oz   How often do you drink alcohol? Never           1/9/2023     1:43 PM   Eating Habits   Do you have any dietary restrictions? No   Do you currently binge eat (eat a large amount of food in a short time)? Yes   Are you an emotional eater? Yes   Do you get up to eat after falling asleep? Yes           1/9/2023     1:43 PM   Dining Out History Reviewed With Patient   How often do you dine out? Never.   Where do you dine out? (select all that apply) fast food chains   What types of food do you order when you dine out? hamburger       EXERCISE:        1/9/2023     1:43 PM   --   How often do you exercise? Never   What keeps you from being more active? Pain       NUTRITION DIAGNOSIS:  Obesity r/t long history of positive energy balance aeb BMI >30 kg/m2.    INTERVENTION:  Reviewed current dietary habits and pts history   Discussed long-term goals pt hopes to accomplish in RD appointments  Answered pt questions  Coordination of care   Nutrition education - Plate method, fats, low kcal beverages, starchy vs non-starchy vegetables, sources of carbohydrates, lean protein vs fattier proteins  AVS and handouts via [employer]          1/9/2023     1:43 PM   Questions Reviewed With Patient   How ready are you to make changes regarding your weight? Number 1 = Not ready at all to make changes up to 10 = very ready. 1   How confident are you that you can change? 1 = Not confident that you will be successful making changes up to 10 = very confident. 1     Expected Engagement: fair-good (good engagement during appts however pt rated confidence as a 1 per questionnaire)     GOALS:  Recommend replenishing with electrolytes when having diarrhea (Propel, Pedialyte, Gatorade/Powerade (low sugar or sugar-free)  Recommend increasing fiber in diet to help with diarrhea (carrots, banana, oatmeal)   Could try cheese/dairy  to see if that helps with diarrhea (could worsen depending what s causing diarrhea)   Could consider a probiotic or fermented foods to see if it helps with diarrhea (simon martin)     Soluble fiber is recommended for both diarrhea/constipation.  Foods rich in insoluble fiber are best for constipation but not diarrhea.   *I think I misspoke and said this opposite when we talked - sorry!    Insoluble fiber adds bulk to your stool and helps food pass more quickly through the stomach and intestines. It can have a laxative effect  Soluble fiber attracts water and forms a gel-like substance with food as it s digested. Aids in diarrhea by helping pull water.     Insoluble fiber examples: wheat bran, brown rice, flaxseed, brady seed, cauliflower, zuchinni, green beans, dark leafy greens, carrots, beets, radishes, fruit skins, intact whole grains, beans/peas    Soluble fiber examples: brussel sprouts, oats, beans, peas, apples, pears, berries, carrots, sweet potatoes, oranges, nuts, seeds    *Note: some food are rich in both soluble and insoluble fiber      Fermented foods   Yogurt with live bacterial cultures   Kimichi   Apple cider Vinegar   Miso   Natto   Kvass   Pickles   Olives   Kefir   Yrn Pereyra   Probiotics: lactobacillus or acidophilus    Goals from last month:  Aim to decrease pop consumption to 1 20 oz bottle/day and 1 30 oz bottle on Fridays and replace with carbonated infused water  Consider Muk[employer] and [employer] for single serve chocolate milk. Aim for 5 8 oz milks/week   Aim for 1 quart of ice cream every other week     Protein shake examples:  Premier Protein (160 Calories, 30 g protein)  Boost/Ensure Max (160 calories, 30 gm protein)   Fairlife Core Power (170 calories, 26 gm protein)  Aldi's Elevation Protein Shake (160 calories, 30 gm protein)   Equate Protein Shake (160 calories, 30 gm protein)  Slim Fast Advanced Nutrition (180 Calories, 20 g protein)  Muscle Milk, lactose-free, 17 oz bottle  (210 Calories, 30 g protein)    Ideas to bring on the go:   - Nutrigrain bars   - Almonds   - Tuna    - Snack packs   - Dried fruit (try to find no sugar added)   - Yogurt   - Yogurt covered nuts     Meal ideas or components discussed: honey mustard chicken, baked beans, asparagus, broccoli, chili with beans and beef, white chicken chili with onions/peppers, fish - walleye, salmon, etc., Barilla Protein + pasta, loaded baked potatoe, sweet potatoe breakfast skillet with eggs, tuna salad, tuna casserole     RESOURCES:     Plate method can be used for general guidance on balanced meals/portion sizes (see link below for picture/more information)                 Make 1/2 your plate non starchy vegetables (cauliflower, broccoli, asparagus, [place]utton sprouts, lettuce, carrots, for example).                5+ oz lean protein sources (salmon/skinless chicken/turkey breast/pork loin/lean cuts of beef/ or non-animal protein such as black, kidney or kuo beans, tofu/edamame/tempeh)     (Note: 3 oz = deck of cards size)                 ~1 cup carbohydrate choices such as whole grain starches or starchy vegetables or fruit. For example: quinoa, brown rice, barley, potatoes, sweet potatoes, winter squash, peas, corn, or fruit.               Choose ~0-2 added fat servings at a meal (avocados, nut butters, nuts or seeds, olive oil, vegetable oils).    The Plate Method:  https://www.cdc.gov/diabetes/images/managing/Diabetes-Manage-Eat-Well-Plate-Graphic_600px.jpg    Building a Plate  [email or web address]    Protein Sources for Weight Loss  [email or web address]     Carbohydrates  [email or web address]     Mindful Eating  [email or web address]     Summary of Volumetrics Eating Plan  [email or web address]     Seated Exercises for Arms and Legs (can be done before or after surgery)  [email or web address]    Follow up:    Monday, February 26th at 2:30 pm    Time spent with  patient: 16 minutes.  DHEERAJ Israel, RD, LD

## 2024-01-30 NOTE — NURSING NOTE
Is the patient currently in the state of [place]? YES    Visit mode:VIDEO    If the visit is dropped, the patient can be reconnected by: VIDEO VISIT: Send to e-mail at: [email or web address]    Will anyone else be joining the visit? NO  (If patient encounters technical issues they should call [phone]    How would you like to obtain your AVS? MyChart    Are changes needed to the allergy or medication list? Pt stated no changes to allergies and Pt stated no med changes    Reason for visit: Follow Up    Stacie Steel VVF    No wt to report

## 2024-01-31 ENCOUNTER — NURSE TRIAGE (OUTPATIENT)
Dept: FAMILY MEDICINE | Facility: CLINIC | Age: 44
End: 2024-01-31
Payer: COMMERCIAL

## 2024-01-31 NOTE — TELEPHONE ENCOUNTER
Diarrhea began Sunday, taking imodium 4 tabs a day  Not eating very much because patient does not want it to go right through, drinking Gatorade for electrolytes though and endorses urinating well.   Has not tested for COVID, denies runny nose or cough.     Recommended home care, patient agreeable.     Reason for Disposition   MILD-MODERATE diarrhea (e.g., 1-6 times / day more than normal)    Additional Information   Negative: Shock suspected (e.g., cold/pale/clammy skin, too weak to stand, low BP, rapid pulse)   Negative: Difficult to awaken or acting confused (e.g., disoriented, slurred speech)   Negative: Sounds like a life-threatening emergency to the triager   Negative: Vomiting also present and worse than the diarrhea   Negative: Blood in stool and without diarrhea   Negative: SEVERE abdominal pain (e.g., excruciating) and present > 1 hour   Negative: SEVERE abdominal pain and age > 60 years   Negative: Bloody, black, or tarry bowel movements  (Exception: Chronic-unchanged black-grey bowel movements and is taking iron pills or Pepto-Bismol.)   Negative: SEVERE diarrhea (e.g., 7 or more times / day more than normal) and age > 60 years   Negative: Constant abdominal pain lasting > 2 hours   Negative: Drinking very little and dehydration suspected (e.g., no urine > 12 hours, very dry mouth, very lightheaded)   Negative: Patient sounds very sick or weak to the triager   Negative: SEVERE diarrhea (e.g., 7 or more times / day more than normal) and present > 24 hours (1 day)   Negative: MODERATE diarrhea (e.g., 4-6 times / day more than normal) and present > 48 hours (2 days)   Negative: MODERATE diarrhea (e.g., 4-6 times / day more than normal) and age > 70 years   Negative: Abdominal pain (Exception: Pain clears completely with each passage of diarrhea stool.)   Negative: Fever > 101 F (38.3 C)   Negative: Blood in the stool  (Exception: Only on toilet paper. Reason: Diarrhea can cause rectal irritation with blood  "on wiping.)   Negative: Mucus or pus in stool has been present > 2 days and diarrhea is more than mild   Negative: Weak immune system (e.g., HIV positive, cancer chemo, splenectomy, organ transplant, chronic steroids)   Negative: Travel to a foreign country in past month   Negative: Recent antibiotic therapy (i.e., within last 2 months) and diarrhea present > 3 days since antibiotic was stopped   Negative: Recent hospitalization and diarrhea present > 3 days   Negative: Tube feedings (e.g., nasogastric, g-tube, j-tube)   Negative: MILD diarrhea (e.g., 1-3 or more stools than normal in past 24 hours) diarrhea without known cause and present > 7 days   Negative: Patient wants to be seen   Negative: Diarrhea is a chronic symptom (recurrent or ongoing AND lasting > 4 weeks)   Negative: SEVERE diarrhea (e.g., 7 or more times / day more than normal)    Answer Assessment - Initial Assessment Questions  1. DIARRHEA SEVERITY: \"How bad is the diarrhea?\" \"How many more stools have you had in the past 24 hours than normal?\"     - NO DIARRHEA (SCALE 0)    - MILD (SCALE 1-3): Few loose or mushy BMs; increase of 1-3 stools over normal daily number of stools; mild increase in ostomy output.    -  MODERATE (SCALE 4-7): Increase of 4-6 stools daily over normal; moderate increase in ostomy output.    -  SEVERE (SCALE 8-10; OR \"WORST POSSIBLE\"): Increase of 7 or more stools daily over normal; moderate increase in ostomy output; incontinence.      Mild  2. ONSET: \"When did the diarrhea begin?\"       Sunday night   3. BM CONSISTENCY: \"How loose or watery is the diarrhea?\"       Watery sometimes has chunks, brown. Denies red or black  4. VOMITING: \"Are you also vomiting?\" If Yes, ask: \"How many times in the past 24 hours?\"       Denies   5. ABDOMEN PAIN: \"Are you having any abdomen pain?\" If Yes, ask: \"What does it feel like?\" (e.g., crampy, dull, intermittent, constant)       Denies stomach cramps but did have stomach cramps on Monday " "morning.   6. ABDOMEN PAIN SEVERITY: If present, ask: \"How bad is the pain?\"  (e.g., Scale 1-10; mild, moderate, or severe)    - MILD (1-3): doesn't interfere with normal activities, abdomen soft and not tender to touch     - MODERATE (4-7): interferes with normal activities or awakens from sleep, abdomen tender to touch     - SEVERE (8-10): excruciating pain, doubled over, unable to do any normal activities        Denies   7. ORAL INTAKE: If vomiting, \"Have you been able to drink liquids?\" \"How much liquids have you had in the past 24 hours?\"      Yes drinking fluids, endorses urinating normally.   8. HYDRATION: \"Any signs of dehydration?\" (e.g., dry mouth [not just dry lips], too weak to stand, dizziness, new weight loss) \"When did you last urinate?\"      Denies dry mouth, weakness and dizziness. Last urinated in the afternoon.   9. EXPOSURE: \"Have you traveled to a foreign country recently?\" \"Have you been exposed to anyone with diarrhea?\" \"Could you have eaten any food that was spoiled?\"      Denies  10. ANTIBIOTIC USE: \"Are you taking antibiotics now or have you taken antibiotics in the past 2 months?\"        Denies   11. OTHER SYMPTOMS: \"Do you have any other symptoms?\" (e.g., fever, blood in stool)        Denies   12. PREGNANCY: \"Is there any chance you are pregnant?\" \"When was your last menstrual period?\"        N/a    Protocols used: Diarrhea-A-OH    "

## 2024-02-01 ENCOUNTER — MYC MEDICAL ADVICE (OUTPATIENT)
Dept: FAMILY MEDICINE | Facility: CLINIC | Age: 44
End: 2024-02-01
Payer: COMMERCIAL

## 2024-02-01 DIAGNOSIS — Z71.6 ENCOUNTER FOR SMOKING CESSATION COUNSELING: ICD-10-CM

## 2024-02-02 NOTE — NURSING NOTE
Chief Complaint   Patient presents with     Recheck Medication       Initial /76 (BP Location: Right arm, Patient Position: Chair, Cuff Size: Adult Large)  Pulse 75  Temp 98.3  F (36.8  C) (Oral)  Resp 12  Ht 6' (1.829 m)  Wt (!) 331 lb 8 oz (150.4 kg)  SpO2 98%  BMI 44.96 kg/m2 Estimated body mass index is 44.96 kg/(m^2) as calculated from the following:    Height as of this encounter: 6' (1.829 m).    Weight as of this encounter: 331 lb 8 oz (150.4 kg).  Medication Reconciliation: complete   CINDY Randhawa      
Geo Pratt

## 2024-02-09 DIAGNOSIS — E66.813 CLASS 3 SEVERE OBESITY DUE TO EXCESS CALORIES WITH SERIOUS COMORBIDITY AND BODY MASS INDEX (BMI) OF 50.0 TO 59.9 IN ADULT (H): ICD-10-CM

## 2024-02-09 DIAGNOSIS — E66.01 CLASS 3 SEVERE OBESITY DUE TO EXCESS CALORIES WITH SERIOUS COMORBIDITY AND BODY MASS INDEX (BMI) OF 50.0 TO 59.9 IN ADULT (H): ICD-10-CM

## 2024-02-09 NOTE — RESULT ENCOUNTER NOTE
Pt informed of results. Redo in 3 yr. Bed in lowest position, wheels locked, appropriate side rails in place/Call bell, personal items and telephone in reach/Instruct patient to call for assistance before getting out of bed or chair/Non-slip footwear when patient is out of bed/[place] to call system/Physically safe environment - no spills, clutter or unnecessary equipment/Purposeful Proactive Rounding/Room/bathroom lighting operational, light cord in reach

## 2024-02-12 ENCOUNTER — PATIENT OUTREACH (OUTPATIENT)
Dept: GASTROENTEROLOGY | Facility: CLINIC | Age: 44
End: 2024-02-12
Payer: COMMERCIAL

## 2024-02-15 NOTE — TELEPHONE ENCOUNTER
Semaglutide-Weight Management (WEGOVY) 2.4 MG/0.75ML pen       Last Written Prescription Date:  1/17/24  Last Fill Quantity: 3 ml ,   # refills: 3   Last Office Visit: 12/11/23  Future Office visit:   2/22/24     Sent to pharmacy 1/17/24 Receipt confirmed by pharmacy (1/17/2024 10:27 AM CST)        [id number] - [place], [place] - [address] [place] [address] [place]

## 2024-02-22 ENCOUNTER — OFFICE VISIT (OUTPATIENT)
Dept: ENDOCRINOLOGY | Facility: CLINIC | Age: 44
End: 2024-02-22
Payer: COMMERCIAL

## 2024-02-22 ENCOUNTER — TELEPHONE (OUTPATIENT)
Dept: ENDOCRINOLOGY | Facility: CLINIC | Age: 44
End: 2024-02-22

## 2024-02-22 VITALS
OXYGEN SATURATION: 97 % | HEIGHT: 72 IN | BODY MASS INDEX: 42.66 KG/M2 | WEIGHT: 315 LBS | DIASTOLIC BLOOD PRESSURE: 80 MMHG | HEART RATE: 91 BPM | SYSTOLIC BLOOD PRESSURE: 125 MMHG

## 2024-02-22 DIAGNOSIS — E66.01 CLASS 3 SEVERE OBESITY WITH SERIOUS COMORBIDITY AND BODY MASS INDEX (BMI) OF 50.0 TO 59.9 IN ADULT, UNSPECIFIED OBESITY TYPE (H): ICD-10-CM

## 2024-02-22 DIAGNOSIS — M25.551 BILATERAL HIP PAIN: ICD-10-CM

## 2024-02-22 DIAGNOSIS — M25.552 BILATERAL HIP PAIN: ICD-10-CM

## 2024-02-22 DIAGNOSIS — E66.813 CLASS 3 SEVERE OBESITY WITH SERIOUS COMORBIDITY AND BODY MASS INDEX (BMI) OF 50.0 TO 59.9 IN ADULT, UNSPECIFIED OBESITY TYPE (H): ICD-10-CM

## 2024-02-22 PROCEDURE — 99214 OFFICE O/P EST MOD 30 MIN: CPT

## 2024-02-22 ASSESSMENT — PAIN SCALES - GENERAL: PAINLEVEL: EXTREME PAIN (8)

## 2024-02-22 NOTE — TELEPHONE ENCOUNTER
Prior Authorization Approval    Medication: ZEPBOUND 7.5 MG/0.5ML SC SOAJ  Authorization Effective Date: 1/23/2024  Authorization Expiration Date: 10/19/2024  Approved Dose/Quantity: uud   Reference #: Key: [id number]   Insurance Company: [employer] Non-Specialty PA's - Phone [phone] Fax [phone]  Expected CoPay: $0.00    CoPay Card Available:      Financial Assistance Needed:    Which Pharmacy is filling the prescription[place] [id number] [place], [place] - [address]  Pharmacy Notified: Yes

## 2024-02-22 NOTE — LETTER
2024       RE: Kimo Greenwood   [address]  [facility] [address]     Dear Colleague,    Thank you for referring your patient, Kimo Greenwood, to the [facility] [place] at [facility]. Please see a copy of my visit note below.      Return Medical Weight Management Note     Kimo Greenwood  MRN:  [id number]  :  1980  SALEEM:  2024    Dear Ciro Love MD,    I had the pleasure of seeing your patient Kimo Greenwood. He is a 43 year old male who I am continuing to see for treatment of obesity related to:        2023     1:43 PM   --   I have the following health issues associated with obesity None of the above       Assessment & Plan  Problem List Items Addressed This Visit       Class 3 severe obesity with serious comorbidity and body mass index (BMI) of 50.0 to 59.9 in adult (H)    Relevant Medications    tirzepatide-Weight Management (ZEPBOUND) 7.5 MG/0.5ML prefilled pen     Other Visit Diagnoses       Bilateral hip pain        Relevant Orders    Pool Therapy Referral           Transition to Zepbound 7.5mg once weekly. Stop Wegovy 2.4mg one week prior to starting Zepbound.   Pool Therapy referral placed to [facility]   Reach out to orthopedic surgeon to discuss smoking cessation prior to surgery   Follow up with Nya Camarena in 3 months     INTERVAL HISTORY:  First seen for NBS - 2023  Continued with MWM, concerned for post op diet.   Weight loss required for hip replacement <330lbs  Transitioned to Wegovy 1.0mg   3/13/23 - continued Wegovy 1.0mg, goal of decreasing pop to 1-2 bottles daily   Reached out via [email or web address] 23 - increased Wegovy 1.7mg   23 - continued Wegovy 1.7mg   2023- continued wegovy 1.7, was finding it helpful with hunger and weight loss    Reached out via [email or web address] and increased Wegovy 2.4mg    Tried to transition to zepbound, but was not on formulary  "      Anti-obesity medication history    Current:   Wegovy 2.4mg - No side effects. Still helpful. Would like to see if Zepbound is approved.     Zepbound denied last visit due to not being on formulary at that time.     Past/Failed/contraindicated:   - Phentermine - previously tried, but no effect on weight. No side effects   - Topiramate - previously tired, but no effect on weight. No side effects     Recent diet changes: Switched to fresca, only having 2L pop weekly. Eating 2 meals daily. No snacking. However, by dinner time is \"starving\" - possibly leading to a later night snack. Off track a little over the holidays. Has been back on normal food choices. Has been picking really good choices when going to the grocery store.     Recent exercise/activity changes: Pool just opened at his apartment, so hoping to start getting into the pool. Would be interested in pool therapy referral today. Hard to be active with chronic hip and knee pain.     Recent stressors: No concerns     Recent sleep changes: having increase fatigue. Will discuss further with PCP. Can consider B12 supplement       CURRENT WEIGHT:   375 lbs 14.4 oz    Initial Weight (lbs): 407 lbs  Last Visits Weight: 164.7 kg (363 lb)  Cumulative weight loss (lbs): 31.1  Weight Loss Percentage: 7.64%    Wt Readings from Last 5 Encounters:   02/22/24 (!) 170.5 kg (375 lb 14.4 oz)   01/16/24 (!) 164.7 kg (363 lb)   12/29/23 (!) 164.7 kg (363 lb)   12/12/23 (!) 171 kg (377 lb)   12/11/23 (!) 171.8 kg (378 lb 12.8 oz)             12/11/2023     2:20 PM   Changes and Difficulties   I have made the following changes to my diet since my last visit: Eating less. Added more protein   With regards to my diet, I am still struggling with: too much soda   I have made the following changes to my activity/exercise since my last visit: none   With regards to my activity/exercise, I am still struggling with: starting         MEDICATIONS:   Current Outpatient Medications "   Medication Sig Dispense Refill    tirzepatide-Weight Management (ZEPBOUND) 7.5 MG/0.5ML prefilled pen Inject 0.5 mLs (7.5 mg) Subcutaneous every 7 days 2 mL 3    albuterol (VENTOLIN HFA) 108 (90 Base) MCG/ACT inhaler INHALE 2 PUFFS INTO LUNGS EVERY SIX HOURS AS NEEDED FOR SHORTNESS OF BREATH / DYSPNEA OR WHEEZING 18 g 1    calcium polycarbophil (FIBER-LAX) 625 MG tablet Take 2 tablets (1,250 mg) by mouth 2 times daily 360 tablet 0    DULoxetine (CYMBALTA) 60 MG capsule Take 120 mg by mouth daily       hydrochlorothiazide (HYDRODIURIL) 25 MG tablet Take 1 tablet (25 mg) by mouth daily 30 tablet 7    lisinopril (ZESTRIL) 10 MG tablet Take 1 tablet (10 mg) by mouth every morning 30 tablet 7    meloxicam (MOBIC) 15 MG tablet Take 1 tablet (15 mg) by mouth daily 30 tablet 11    mirabegron (MYRBETRIQ) 50 MG 24 hr tablet Take 1 tablet by mouth daily      nicotine (NICORETTE) 2 MG gum Place 1 each (2 mg) inside cheek every hour as needed for nicotine withdrawal symptoms 50 each 1    nicotine (NICOTROL) 10 MG inhaler Use 1 cartridge as needed for urge to smoke by puffing over course of 20min.  Use 6-16 cart/day; reduce number of cart/day over 6-12 weeks. 6 each 3    nicotine (NICOTROL) 10 MG inhaler Use 1 cartridge as needed for urge to smoke by puffing over course of 20min.  Use 6-16 cart/day; reduce number of cart/day over 6-12 weeks. 158 each 1    OLANZapine-samidorphan (LYBALVI) 10-10 MG TABS tablet Take 1 tablet by mouth At Bedtime      order for DME Equipment being ordered: CPAP supplies 1 each 0    oxyBUTYnin ER (DITROPAN XL) 15 MG 24 hr tablet Take 2 tablets (30 mg) by mouth daily 60 tablet 1    pantoprazole (PROTONIX) 40 MG EC tablet TAKE 1 TABLET BY MOUTH ONCE DAILY 30-60 MINUTES BR FIRST MEAL OF THE DAY 30 tablet 7    prazosin (MINIPRESS) 1 MG capsule Take 3 mg by mouth At Bedtime      Semaglutide-Weight Management (WEGOVY) 2.4 MG/0.75ML pen Inject 2.4 mg Subcutaneous once a week 3 mL 3    varenicline (CHANTIX  WENDI) 0.5 MG X 11 & 1 MG X 42 tablet Take 0.5 mg tab daily for 3 days, THEN 0.5 mg tab twice daily for 4 days, THEN 1 mg twice daily. (Patient not taking: Reported on 12/12/2023) 53 tablet 0           12/11/2023     2:20 PM   Weight Loss Medication History Reviewed With Patient   Which weight loss medications are you currently taking on a regular basis? Wegovy   Are you having any side effects from the weight loss medication that we have prescribed you? No         Objective   /80   Pulse 91   Ht 1.829 m (6')   Wt (!) 170.5 kg (375 lb 14.4 oz)   SpO2 97%   BMI 50.98 kg/m        PHYSICAL EXAM:  GENERAL: alert and no distress  EYES: Eyes grossly normal to inspection.  No discharge or erythema, or obvious scleral/conjunctival abnormalities.  RESP: No audible wheeze, cough, or visible cyanosis.    SKIN: Visible skin clear. No significant rash, abnormal pigmentation or lesions.  NEURO: Cranial nerves grossly intact.  Mentation and speech appropriate for age.  PSYCH: Appropriate affect, tone, and pace of words        Sincerely,    Nya Lara PA-C      35 minutes spent by me on the date of the encounter doing chart review, history and exam, documentation and further activities per the note

## 2024-02-22 NOTE — PROGRESS NOTES
Return Medical Weight Management Note     Kimo Greenwood  MRN:  [id number]  :  1980  SALEEM:  2024    Dear Ciro Love MD,    I had the pleasure of seeing your patient Kimo Greenwood. He is a 43 year old male who I am continuing to see for treatment of obesity related to:        2023     1:43 PM   --   I have the following health issues associated with obesity None of the above       Assessment & Plan   Problem List Items Addressed This Visit       Class 3 severe obesity with serious comorbidity and body mass index (BMI) of 50.0 to 59.9 in adult (H)    Relevant Medications    tirzepatide-Weight Management (ZEPBOUND) 7.5 MG/0.5ML prefilled pen     Other Visit Diagnoses       Bilateral hip pain        Relevant Orders    Pool Therapy Referral           Transition to Zepbound 7.5mg once weekly. Stop Wegovy 2.4mg one week prior to starting Zepbound.   Pool Therapy referral [facility] to [facility]   Reach out to orthopedic surgeon to discuss smoking cessation prior to surgery   Follow up with Nya Camarena in 3 months     INTERVAL HISTORY:  First seen for NBS - 2023  Continued with MWM, concerned for post op diet.   Weight loss required for hip replacement <330lbs  Transitioned to Wegovy 1.0mg   3/13/23 - continued Wegovy 1.0mg, goal of decreasing pop to 1-2 bottles daily   Reached out via [employer] 23 - increased Wegovy 1.7mg   23 - continued Wegovy 1.7mg   2023- continued wegovy 1.7, was finding it helpful with hunger and weight loss    Reached out via [employer] and increased Wegovy 2.4mg    Tried to transition to zepbound, but was not on formulary       Anti-obesity medication history    Current:   Wegovy 2.4mg - No side effects. Still helpful. Would like to see if Zepbound is approved.     Zepbound denied last visit due to not being on formulary at that time.     Past/Failed/contraindicated:   - Phentermine - previously tried, but no effect on weight. No side effects   - Topiramate -  "previously tired, but no effect on weight. No side effects     Recent diet changes: Switched to fresca, only having 2L pop weekly. Eating 2 meals daily. No snacking. However, by dinner time is \"starving\" - possibly leading to a later night snack. Off track a little over the holidays. Has been back on normal food choices. Has been picking really good choices when going to the grocery store.     Recent exercise/activity changes: Pool just opened at his apartment, so hoping to start getting into the pool. Would be interested in pool therapy referral today. Hard to be active with chronic hip and knee pain.     Recent stressors: No concerns     Recent sleep changes: having increase fatigue. Will discuss further with PCP. Can consider B12 supplement       CURRENT WEIGHT:   375 lbs 14.4 oz    Initial Weight (lbs): 407 lbs  Last Visits Weight: 164.7 kg (363 lb)  Cumulative weight loss (lbs): 31.1  Weight Loss Percentage: 7.64%    Wt Readings from Last 5 Encounters:   02/22/24 (!) 170.5 kg (375 lb 14.4 oz)   01/16/24 (!) 164.7 kg (363 lb)   12/29/23 (!) 164.7 kg (363 lb)   12/12/23 (!) 171 kg (377 lb)   12/11/23 (!) 171.8 kg (378 lb 12.8 oz)             12/11/2023     2:20 PM   Changes and Difficulties   I have made the following changes to my diet since my last visit: Eating less. Added more protein   With regards to my diet, I am still struggling with: too much soda   I have made the following changes to my activity/exercise since my last visit: none   With regards to my activity/exercise, I am still struggling with: starting         MEDICATIONS:   Current Outpatient Medications   Medication Sig Dispense Refill    tirzepatide-Weight Management (ZEPBOUND) 7.5 MG/0.5ML prefilled pen Inject 0.5 mLs (7.5 mg) Subcutaneous every 7 days 2 mL 3    albuterol (VENTOLIN HFA) 108 (90 Base) MCG/ACT inhaler INHALE 2 PUFFS INTO LUNGS EVERY SIX HOURS AS NEEDED FOR SHORTNESS OF BREATH / DYSPNEA OR WHEEZING 18 g 1    calcium polycarbophil " (FIBER-LAX) 625 MG tablet Take 2 tablets (1,250 mg) by mouth 2 times daily 360 tablet 0    DULoxetine (CYMBALTA) 60 MG capsule Take 120 mg by mouth daily       hydrochlorothiazide (HYDRODIURIL) 25 MG tablet Take 1 tablet (25 mg) by mouth daily 30 tablet 7    lisinopril (ZESTRIL) 10 MG tablet Take 1 tablet (10 mg) by mouth every morning 30 tablet 7    meloxicam (MOBIC) 15 MG tablet Take 1 tablet (15 mg) by mouth daily 30 tablet 11    mirabegron (MYRBETRIQ) 50 MG 24 hr tablet Take 1 tablet by mouth daily      nicotine (NICORETTE) 2 MG gum Place 1 each (2 mg) inside cheek every hour as needed for nicotine withdrawal symptoms 50 each 1    nicotine (NICOTROL) 10 MG inhaler Use 1 cartridge as needed for urge to smoke by puffing over course of 20min.  Use 6-16 cart/day; reduce number of cart/day over 6-12 weeks. 6 each 3    nicotine (NICOTROL) 10 MG inhaler Use 1 cartridge as needed for urge to smoke by puffing over course of 20min.  Use 6-16 cart/day; reduce number of cart/day over 6-12 weeks. 158 each 1    OLANZapine-samidorphan (LYBALVI) 10-10 MG TABS tablet Take 1 tablet by mouth At Bedtime      order for DME Equipment being ordered: CPAP supplies 1 each 0    oxyBUTYnin ER (DITROPAN XL) 15 MG 24 hr tablet Take 2 tablets (30 mg) by mouth daily 60 tablet 1    pantoprazole (PROTONIX) 40 MG EC tablet TAKE 1 TABLET BY MOUTH ONCE DAILY 30-60 MINUTES BR FIRST MEAL OF THE DAY 30 tablet 7    prazosin (MINIPRESS) 1 MG capsule Take 3 mg by mouth At Bedtime      Semaglutide-Weight Management (WEGOVY) 2.4 MG/0.75ML pen Inject 2.4 mg Subcutaneous once a week 3 mL 3    varenicline (CHANTIX WENDI) 0.5 MG X 11 & 1 MG X 42 tablet Take 0.5 mg tab daily for 3 days, THEN 0.5 mg tab twice daily for 4 days, THEN 1 mg twice daily. (Patient not taking: Reported on 12/12/2023) 53 tablet 0           12/11/2023     2:20 PM   Weight Loss Medication History Reviewed With Patient   Which weight loss medications are you currently taking on a regular  basis? Wegovy   Are you having any side effects from the weight loss medication that we have prescribed you? No         Objective    /80   Pulse 91   Ht 1.829 m (6')   Wt (!) 170.5 kg (375 lb 14.4 oz)   SpO2 97%   BMI 50.98 kg/m        PHYSICAL EXAM:  GENERAL: alert and no distress  EYES: Eyes grossly normal to inspection.  No discharge or erythema, or obvious scleral/conjunctival abnormalities.  RESP: No audible wheeze, cough, or visible cyanosis.    SKIN: Visible skin clear. No significant rash, abnormal pigmentation or lesions.  NEURO: Cranial nerves grossly intact.  Mentation and speech appropriate for age.  PSYCH: Appropriate affect, tone, and pace of words        Sincerely,    Nya Lara PA-C      35 minutes spent by me on the date of the encounter doing chart review, history and exam, documentation and further activities per the note

## 2024-02-22 NOTE — PATIENT INSTRUCTIONS
"Robert Lamar,  Thank you for allowing us the privilege of caring for you. We hope we provided you with the excellent service you deserve.   Please let us know if there is anything else we can do for you so that we can be sure you are completely satisfied with your care experience.    To ensure the quality of our services you may be receiving a patient satisfaction survey from an independent patient satisfaction monitoring company.    The greatest compliment you can give is a \"Likely to Recommend\"    Your visit was with Nya Lara PA-C today.    Instructions per today's visit:   Transition to Zepbound 7.5mg once weekly. Stop Wegovy 2.4mg one week prior to starting Zepbound.   Pool Therapy referral placed to [facility]   Reach out to orthopedic surgeon to discuss smoking cessation prior to surgery   Follow up with Nya Camarena in 3 months     ___________________________________________________________________________  Important contact and scheduling information:  Please call our contact center at [phone] to schedule your next appointments.  For any nursing questions or concerns call Niurka Haque LPN at [phone] or Denise Hubbard RN at [phone]  Please call during clinic hours Monday through Friday 8:00a - 4:00p if you have questions or you can contact us via [employer] at anytime and we will reply during clinic hours.    Lab results will be communicated through My Chart or letter (if My Chart not used). Please call the clinic if you have not received communication after 1 week or if you have any questions.?  Clinic Fax: [phone]  __________________________________________________________________________    If labs were ordered today:    Please make an appointment to have them drawn at your convenience.     To schedule the Lab Appointment using [email or web address]:  Select \"Schedule an Appointment\"  Select \"Lab Only\"  For \"A couple of questions\", select \"Other\"  For \"Which locations work for you?, select the location and " set up the appointment    To schedule by phone call [phone] to schedule a lab only appointment at any [facility] lab.  ___________________________________________________________________________  Work with A Health !  Virtual Sessions are Available through [facility]    To learn more, call to schedule a free, Health  Q&A appointment: [phone]     What is Health Coaching?  Do you know what you are supposed to do, but you just aren't doing it?  Then, HEALTH COACHING may help you!   Get unstuck and move forward with the support of a professionally trained NBC-HWC (National Board-Certified Health and ) who uses evidence-based approaches to help you move forward with healthy lifestyle changes in the areas of weight loss, stress management and overall well-being.    Health Coaches help you identify goals that will work best for you. Health Coaches provide support and encouragement with overcoming barriers and help you to find inspiration and motivation to lead a healthy lifestyle.    Option one:  Health Coaching 3-Pack; Three, 30-minute Health Coaching Visits, for $99  Visits are done virtually (phone or video)  This is a self pay service; we do not accept insurance for mansi coaching.    Option two:   The 24 week Plan; 11 Health Coaching Visits, and a 7 months subscription to [employer]-- on-demand fitness, nutrition and mindfulness classes, for $499 (employee discounts may be available). Participants will also meet regularly with a weight management Medical Provider and a Registered/Licensed Dietician.  This is a self-pay service; we do not accept insurance for health coaching.    To Schedule a free Health  Q&A appointment to learn more,  call [phone].  ____________________________________________________________________  [facility]  Healthy Lifestyle Group    Healthy Lifestyle Group  This is a  "60 minute virtual coaching group for those who want to lead a healthier lifestyle. Come together to set goals and overcome barriers in a supportive group environment. We will address the four pillars of health--nutrition, exercise, sleep and emotional well-being.  This group is highly recommended for those who are participating in the 24 week Healthy Lifestyle Plan and our Health Coaching sessions.    WHEN: This group meets the first Friday of the month, 12:30 PM - 1:30 PM online, via a zoom meeting.      FACILITATOR: Led by National Board Certified Health and , Jacklyn Benedict, [facility].    TO REGISTER: Please call the Call Center at [phone] to register. You will get an appointment to attend in [place]. Fifteen minutes prior to the meeting, complete the e-check in and you will get the link to join the meeting.  There is no charge to attend this group and space is limited.      2023 and 2024 Meeting Topics and Dates:    November 3: Introduction to Mindfulness (Learn simple and effective mindfulness practices and how it can benefit you)    December 8: Let's Talk (guided discussion on our wins and challenges)    January 5: New Years Vision: Manifest your Best 2024! (Guided imagery,  journaling and discussion)    February 2: Let's Talk    March 1: 10 Percent Happier by Laureano Avina (Book Bites; a guided discussion on the nuggets of wisdom from favorite wellness books; no need to read the book but highly encouraged)    April 5: Let's Talk    May 3: \"Essentialism; The Disciplined Pursuit of Less by Balta Cartwright (book bites discussion)    June 7: Let's Talk    July 5: NO MEETING, off for the 4th of July Holiday    August 2: The Blue Zones, Secrets for Living a Longer Life by Laureano Mike (book bites discussion)      If you would like bariatric surgery specific support group info please let your care team know.         Thank you,   [facility] Comprehensive Weight Management Team      Zepbound " (Tirzepatide)    Zepbound (Tirzepatide): is an injectable prescription medicine recently FDA approved for adults with obesity or overweight with an additional condition affected by their weight (type 2 diabetes, high blood pressure, high cholesterol, obstructive sleep apnea, etc). Zepbound contains the same active ingredient as what is in Mounjaro, an injectable FDA approved for Type 2 Diabtes. Zepbound is intended to be used along with dietary/behavioral changes and consistent exercise to improve assist with weight loss and other health improvement outcomes.     It is given as a shot once a week. It activates the body's receptors for GIP (glucose-dependent insulinotropic polypeptide) and GLP-1 (glucagon-like peptide-1) receptor, two naturally occurring hormones that help tell the brain that you are full. It also works is by slowing down how quickly food leaves your stomach. What this means for you: You will start to feel groves more quickly, notice portion changes and eat less often between meals. It is still important to maintain consistent eating schedule with meals +/- snacks and get in good hydration.      Dosing:  Initial dose: 2.5 mg injected subcutaneously once weekly for 4 weeks  Further dose changes can include: increase to 5 mg once weekly for 4 weeks, then 7.5 mg once weekly for 4 weeks, then 10 mg once weekly for 4 weeks then 12.5 mg once weekly for 4 weeks, then 15 mg (maximum dose) once weekly.    Dose changes are based on how you are tolerating the current dose, what benefits you are seeing at the current dose and if improvement in effectiveness of the drug is needed. The goal is to find the dose that works best for you with the least amount of side effects. You may find you reach this at a lower dose than the maximum dose.     Side effects: Patients are advised to eat more slowly and purposefully. Give yourself less portions. You may find after starting this medication you have a new point of  fullness. It is also important to stay adequately hydrated on the medication to reduce risks of some of these side effects. Many of these side effects (nausea, diarrhea, etc) occurred during dose escalation but did improve over time with continuing the medication. If side effects are unmanageable, reach out to your prescribing provider.     The risk of pancreatitis (inflammation of the pancreas) has been associated with this type of medication, but is very rare.  If you have had pancreatitis in the past, this medication may not be for you. Please let us know about any past history of pancreas problems. If you experience persistent severe abdominal pain (sometimes radiating to the back potentially accompanied by vomiting and have a fever), stop the medication and contact your provider immediately for assessment. They will do a blood test to check for pancreatitis.       How to Inject:   https://www.zepbound.PayEase.com/how-to-use    Storage:   Store your pen in the refrigerator. You may store your pen at room temperature for up to 21 days. If you store the pen at room temperature, do not return the pen to the refrigerator. Discard the pen if not used within 21 days after removing from the refrigerator.      For any questions or concerns please send a [employer] message to our team or call our weight management call center at [phone] during regular business hours.

## 2024-02-23 ENCOUNTER — TELEPHONE (OUTPATIENT)
Dept: ENDOCRINOLOGY | Facility: CLINIC | Age: 44
End: 2024-02-23
Payer: COMMERCIAL

## 2024-02-26 ENCOUNTER — VIRTUAL VISIT (OUTPATIENT)
Dept: ENDOCRINOLOGY | Facility: CLINIC | Age: 44
End: 2024-02-26
Payer: COMMERCIAL

## 2024-02-26 VITALS — BODY MASS INDEX: 42.66 KG/M2 | HEIGHT: 72 IN | WEIGHT: 315 LBS

## 2024-02-26 DIAGNOSIS — E66.01 CLASS 3 SEVERE OBESITY WITH SERIOUS COMORBIDITY AND BODY MASS INDEX (BMI) OF 50.0 TO 59.9 IN ADULT, UNSPECIFIED OBESITY TYPE (H): ICD-10-CM

## 2024-02-26 DIAGNOSIS — Z71.3 NUTRITIONAL COUNSELING: Primary | ICD-10-CM

## 2024-02-26 DIAGNOSIS — E66.813 CLASS 3 SEVERE OBESITY WITH SERIOUS COMORBIDITY AND BODY MASS INDEX (BMI) OF 50.0 TO 59.9 IN ADULT, UNSPECIFIED OBESITY TYPE (H): ICD-10-CM

## 2024-02-26 PROCEDURE — 99207 PR NO CHARGE LOS: CPT | Mod: 95 | Performed by: DIETITIAN, REGISTERED

## 2024-02-26 PROCEDURE — 97803 MED NUTRITION INDIV SUBSEQ: CPT | Mod: 95 | Performed by: DIETITIAN, REGISTERED

## 2024-02-26 ASSESSMENT — PAIN SCALES - GENERAL: PAINLEVEL: MODERATE PAIN (5)

## 2024-02-26 NOTE — PATIENT INSTRUCTIONS
GOALS:  Continue aiming for 2L or less of soda per week  Continue aiming for 1 quart of chocolate milk every other week  Continue with 1 pint of ice cream every week   Consider making a meal with broccoli and/or a meal with asparagus   Example: loaded baked potato with broccoli and cheese on top    Example: salmon with roasted asparagus   Example: turkey worley wrapped asparagus or ham wrapped asparagus     5.  Consider trying halo top ice cream OR frozen yogurt ice cream (examples: target brand favorite day, Kemps) instead of Terrell & Adrien's next time    Soluble fiber is recommended for both diarrhea/constipation.  Foods rich in insoluble fiber are best for constipation but not diarrhea.   *I think I misspoke and said this opposite when we talked - sorry!    Insoluble fiber adds bulk to your stool and helps food pass more quickly through the stomach and intestines. It can have a laxative effect  Soluble fiber attracts water and forms a gel-like substance with food as it s digested. Aids in diarrhea by helping pull water.     Insoluble fiber examples: wheat bran, brown rice, flaxseed, brady seed, cauliflower, zuchinni, green beans, dark leafy greens, carrots, beets, radishes, fruit skins, intact whole grains, beans/peas    Soluble fiber examples: brussel sprouts, oats, beans, peas, apples, pears, berries, carrots, sweet potatoes, oranges, nuts, seeds    *Note: some food are rich in both soluble and insoluble fiber      Fermented foods   Yogurt with live bacterial cultures   Kimichi   Apple cider Vinegar   Miso   Natto   Kvass   Pickles   Olives   Kefir   Aftabmbuchrosey Pereyra   Probiotics: lactobacillus or acidophilus    Protein shake examples:  Premier Protein (160 Calories, 30 g protein)  Boost/Ensure Max (160 calories, 30 gm protein)   Fairlife Core Power (170 calories, 26 gm protein)  Aldi's Elevation Protein Shake (160 calories, 30 gm protein)   Equate Protein Shake (160 calories, 30 gm protein)  Slim Fast Advanced  Nutrition (180 Calories, 20 g protein)  Muscle Milk, lactose-free, 17 oz bottle (210 Calories, 30 g protein)    Ideas to bring on the go:   - Nutrigrain bars   - Almonds   - Tuna    - Snack packs   - Dried fruit (try to find no sugar added)   - Yogurt   - Yogurt covered nuts     Meal ideas or components discussed: honey mustard chicken, baked beans, asparagus, broccoli, chili with beans and beef, white chicken chili with onions/peppers, fish - walleye, salmon, etc., Barilla Protein + pasta, loaded baked potatoe, sweet potatoe breakfast skillet with eggs, tuna salad, tuna casserole     RESOURCES:     Plate method can be used for general guidance on balanced meals/portion sizes (see link below for picture/more information)                 Make 1/2 your plate non starchy vegetables (cauliflower, broccoli, asparagus, [place] sprouts, lettuce, carrots, for example).                5+ oz lean protein sources (salmon/skinless chicken/turkey breast/pork loin/lean cuts of beef/ or non-animal protein such as black, kidney or kuo beans, tofu/edamame/tempeh)     (Note: 3 oz = deck of cards size)                 ~1 cup carbohydrate choices such as whole grain starches or starchy vegetables or fruit. For example: quinoa, brown rice, barley, potatoes, sweet potatoes, winter squash, peas, corn, or fruit.               Choose ~0-2 added fat servings at a meal (avocados, nut butters, nuts or seeds, olive oil, vegetable oils).    The Plate Method:  https://www.cdc.gov/diabetes/images/managing/Diabetes-Manage-Eat-Well-Plate-Graphic_600px.jpg    Building a Plate  Http://[email or web address]    Protein Sources for Weight Loss  [email or web address]     Carbohydrates  [email or web address]     Mindful Eating  [email or web address]     Summary of Volumetrics Eating Plan  [email or web address]     Seated Exercises for Arms and Legs (can be done before or after  surgery)  [email or web address]://[email or web address]    Follow up:    Thursday, March 28th at 1:00 pm  Thursday, April 25th at 2:00 pm    DHEERAJ Chinchilla (Duncan), RD, LD  Clinic #: [phone]

## 2024-02-26 NOTE — LETTER
"2/26/2024       RE: Kimo Greenwood  [address]  [place] [address]     Dear Colleague,    Thank you for referring your patient, Kimo Greenwood, to the [facility]. Please see a copy of my visit note below.    Video-Visit Details    Type of service:  Video Visit    Video Start Time: 2:30 pm  Video End Time: 2:56 pm    Originating Location (pt. Location): Home    Distant Location (provider location):  Offsite (providers home)     Platform used for Video Visit: Tribe    Nutrition Consultation Note    Reason For Visit: Nutrition Assessment    Kimo Greenwood is a 43 year old presenting today for return nutrition consult.   Pt is interested in laparoscopic sleeve gastrectomy.  Will proceed with MWM at this time per pt/his mother. Did NOT review any surgery dietary guidelines yet.     Pt referred by JERRY Marinelli on January 10, 2023.    Coordination note (if pursuing surgery in future)   Needs baseline labs - Done 1/9/23, would need to be re-done due to being outdated   Needs Psych eval  Needs PCP letter of support  40 lb weight loss from initial  Surgeon meet and greet with Dr. Madrigal  Needs letter of support from therapist  Needs letter of support from psychiatrist       CO-MORBIDITIES OF OBESITY INCLUDE:        1/9/2023     1:43 PM   --   I have the following health issues associated with obesity None of the above     PMH:     Per PA note:  \"Hip OA - needing hip replacement.      LOREN - uses CPAP every night. Followed by PCP      Barretts esophagus - has not had any GERD symptoms recently. Well controlled on PPI.      HTN - well controlled on medications      Degenerative disk disease - causes low back pain. 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 Followed by urology\"    Depression - has therapist/psych, hx of suicide attempt per questionnaire     Speech/Language " delay    SUPPORT:      1/9/2023     1:43 PM   Support System Reviewed With Patient   Who do you have in your support network that can be available to help you for the first 2 weeks after surgery? agency   Who can you count on for support throughout your weight loss surgery journey? fully supportive       ANTHROPOMETRICS:  Consult weight 1/9/23: 407 lbs with BMI 55.28    Estimated body mass index is 50.18 kg/m  as calculated from the following:    Height as of this encounter: 1.829 m (6').    Weight as of this encounter: 167.8 kg (370 lb).     Current weight: 370 lbs, pt report   - Up 7 lbs since last RD visit    Goal weight for hip replacement per Nya Camarena's note: 330 lbs         No data to display                    1/9/2023     1:43 PM   Weight Loss Questions Reviewed With Patient   How long have you been overweight? Since early childhood     MEDICATIONS FOR WEIGHT LOSS:  Wegovy prescribed 1/9/22 - Plan to switch to Zepbound per 2/22/24 PA-C note    Hx   Saxenda -  Had been denied at first but was able to get with appeal per pt  Topiramate (ineffective)    SUPPLEMENT INFORMATION:  Vitamin D3 5,000 international unit(s)/day  B complex    Labs 6/19/23  Vit D WNL  TSH  WNL    Hx of Vit D def    NUTRITION HISTORY:  Pt present today with his mom and legal guardian, Belen.     Patient is considering Bariatric surgery. Hoping to lose weight for hip replacement with goal of 330 lbs. Weight gain due to changes in diet and decrease in activity.     Wants to work on MWM first and consider surgical approach down the line as appropriate.     Worked with a RD a long time ago at [facility] for weight loss.     Mobility is very limited.   Moving into an apartment with a pool next month.     Per PA note (not yet finalized at time of writing this)    Eating 2 meals a day, with snacks. Trying to decrease pop. Meals are provided by group home. However, will go to the store to get candy, chip, cookies, crackers, ice cream, and pudding,  and soda. Has increased hunger. Struggles with getting full. From exam, however Willard have increased hunger,   Breakfast - cereal with milk   Lunch - provided by group home  Dinner - pizza   Snacks - cookies, animal crackers   Pop - 1 liter of regular pop at a time.      Activity - Hip pain limits activity. Will try to get up and walk around as much as possible. Cannot walk more then 50 yards at a time.     Please see previous RD notes for more detail    Dec 2023:  Last RD appt in June - scheduling issues on provider/pt end since then (provider out sick, pt not available etc).     Pt present with Jose care helper.     Continues to notice weight loss.    Struggling with giving up pop and chocolate milk - drinking 1-2 bottles/day. On Fridays may get a 52 oz bottle.   Also drinking juice and beer.  Buys 1/2 gallon of chocolate milk each week.      Ice cream - typically does about 1 quart/week. Tried frozen yogurt but didn't like as much.    Jan 2024:    Pt wanted to discuss how to manage diarrhea.   Deferred on discussing other goals - will carry over to next month.    BM: diarrhea currently - started Sunday. taking generic imodium     Feb 2024:    Diarrhea resolved per pt.    Saw Nya Camarena 2/22/24. Plan to stop Wegovy and transition to Zepbound. Zepbound will be coming tomorrow. Last Wegovy shot was last Wednesday.     MK also placed a pool therapy referral.     Switched pop to fresca (drinking some mtn dew still too)- limiting to 2L/week.     Eating 2 meals/day, occ snacking.   Working on smaller portions and more protein.   Main proteins lately: beef  Fruit - got a  recently so has been doing smoothies. Will do sherbet, apple juice and fruit.   Vegetables - corn a few times at moms house.   Working on making better choices when at store    Eating on salad while on visit today.     PA: limited by knee/hip pain. Does have pool in apartment building      Previous goals (will discuss next appt - not feeling well  today)  Aim to decrease pop consumption to 1 20 oz bottle/day and 1 30 oz bottle on Fridays and replace with carbonated infused water - Met  Consider Mukesh's and [employer] for single serve chocolate milk. Aim for 5 8 oz milks/week - Single serve not in budget currently so getting a quart every other week on average.   Aim for 1 quart of ice cream every other week - Getting a pint every week currently     Goals specific to last month:  Recommend replenishing with electrolytes when having diarrhea (Propel, Pedialyte, Gatorade/Powerade (low sugar or sugar-free)  Recommend increasing fiber in diet to help with diarrhea (carrots, banana, oatmeal)   Could try cheese/dairy to see if that helps with diarrhea (could worsen depending what s causing diarrhea)   Could consider a probiotic or fermented foods to see if it helps with diarrhea (simon martin)     Additional information:  Disabled     Single         1/9/2023     1:43 PM   Recall Diet Questions Reviewed With Patient   Describe what you typically consume for lunch (typical or most recent) candy soda(8)cans to 10cans a day   Describe what you typically consume for supper (typical or most recent) pizza   How many ounces of water, or other low calorie drinks, do you drink daily (8 oz=1 glass)? 0 oz   How many ounces of caffeine (coffee, tea, pop) do you drink daily (8 oz=1 glass)? 24 oz   How many ounces of milk do you drink daily (8 oz=1 glass) 0 oz   How often do you drink alcohol? Never           1/9/2023     1:43 PM   Eating Habits   Do you have any dietary restrictions? No   Do you currently binge eat (eat a large amount of food in a short time)? Yes   Are you an emotional eater? Yes   Do you get up to eat after falling asleep? Yes           1/9/2023     1:43 PM   Dining Out History Reviewed With Patient   How often do you dine out? Never.   Where do you dine out? (select all that apply) fast food chains   What types of food do you order when you dine out?  hamburger       EXERCISE:        1/9/2023     1:43 PM   --   How often do you exercise? Never   What keeps you from being more active? Pain       NUTRITION DIAGNOSIS:  Obesity r/t long history of positive energy balance aeb BMI >30 kg/m2.    INTERVENTION:  Reviewed and modified goals  Answered pt questions  Coordination of care   Nutrition education - Plate method, fats, low kcal beverages, starchy vs non-starchy vegetables, sources of carbohydrates, lean protein vs fattier proteins, Dietary fiber  AVS and handouts via [employer]          1/9/2023     1:43 PM   Questions Reviewed With Patient   How ready are you to make changes regarding your weight? Number 1 = Not ready at all to make changes up to 10 = very ready. 1   How confident are you that you can change? 1 = Not confident that you will be successful making changes up to 10 = very confident. 1     Expected Engagement: fair-good (good engagement during appts however pt rated confidence as a 1 per questionnaire)     GOALS:  Continue aiming for 2L or less of soda per week  Continue aiming for 1 quart of chocolate milk every other week  Continue with 1 pint of ice cream every week   Consider making a meal with broccoli and/or a meal with asparagus   Example: loaded baked potato with broccoli and cheese on top    Example: salmon with roasted asparagus   Example: turkey worley wrapped asparagus or ham wrapped asparagus     5.  Consider trying halo top ice cream OR frozen yogurt ice cream (examples: target brand favorite day, Kemps) instead of Terrell & Adrien's next time    Soluble fiber is recommended for both diarrhea/constipation.  Foods rich in insoluble fiber are best for constipation but not diarrhea.   *I think I misspoke and said this opposite when we talked - sorry!    Insoluble fiber adds bulk to your stool and helps food pass more quickly through the stomach and intestines. It can have a laxative effect  Soluble fiber attracts water and forms a gel-like substance  with food as it s digested. Aids in diarrhea by helping pull water.     Insoluble fiber examples: wheat bran, brown rice, flaxseed, brady seed, cauliflower, zuchinni, green beans, dark leafy greens, carrots, beets, radishes, fruit skins, intact whole grains, beans/peas    Soluble fiber examples: brussel sprouts, oats, beans, peas, apples, pears, berries, carrots, sweet potatoes, oranges, nuts, seeds    *Note: some food are rich in both soluble and insoluble fiber      Fermented foods   Yogurt with live bacterial cultures   Kimichi   Apple cider Vinegar   Miso   Natto   Kvass   Pickles   Olives   Kefir   Kombucha   Sauerkraut   Probiotics: lactobacillus or acidophilus    Protein shake examples:  Premier Protein (160 Calories, 30 g protein)  Boost/Ensure Max (160 calories, 30 gm protein)   Fairlife Core Power (170 calories, 26 gm protein)  Aldi's Elevation Protein Shake (160 calories, 30 gm protein)   Equate Protein Shake (160 calories, 30 gm protein)  Slim Fast Advanced Nutrition (180 Calories, 20 g protein)  Muscle Milk, lactose-free, 17 oz bottle (210 Calories, 30 g protein)    Ideas to bring on the go:   - Nutrigrain bars   - Almonds   - Tuna    - Snack packs   - Dried fruit (try to find no sugar added)   - Yogurt   - Yogurt covered nuts     Meal ideas or components discussed: honey mustard chicken, baked beans, asparagus, broccoli, chili with beans and beef, white chicken chili with onions/peppers, fish - walleye, salmon, etc., Barilla Protein + pasta, loaded baked potatoe, sweet potatoe breakfast skillet with eggs, tuna salad, tuna casserole     RESOURCES:     Plate method can be used for general guidance on balanced meals/portion sizes (see link below for picture/more information)                 Make 1/2 your plate non starchy vegetables (cauliflower, broccoli, asparagus, [place] sprouts, lettuce, carrots, for example).                5+ oz lean protein sources (salmon/skinless chicken/turkey breast/pork  loin/lean cuts of beef/ or non-animal protein such as black, kidney or kuo beans, tofu/edamame/tempeh)     (Note: 3 oz = deck of cards size)                 ~1 cup carbohydrate choices such as whole grain starches or starchy vegetables or fruit. For example: quinoa, brown rice, barley, potatoes, sweet potatoes, winter squash, peas, corn, or fruit.               Choose ~0-2 added fat servings at a meal (avocados, nut butters, nuts or seeds, olive oil, vegetable oils).    The Plate Method:  https://www.cdc.gov/diabetes/images/managing/Diabetes-Manage-Eat-Well-Plate-Graphic_600px.jpg    Building a Plate  [email or web address]    Protein Sources for Weight Loss  [email or web address]     Carbohydrates  [email or web address]     Mindful Eating  [email or web address]://[email or web address]     Summary of Volumetrics Eating Plan  [email or web address]     Seated Exercises for Arms and Legs (can be done before or after surgery)  [email or web address]    Follow up:    Thursday, March 28th at 1:00 pm  Thursday, April 25th at 2:00 pm    Time spent with patient: 26 minutes.  DHEERAJ Israel, RD, LD

## 2024-02-26 NOTE — NURSING NOTE
Is the patient currently in the state of [place]? YES    Visit mode:VIDEO    If the visit is dropped, the patient can be reconnected by: VIDEO VISIT: Send to e-mail at: [email or web address]    Will anyone else be joining the visit? NO  (If patient encounters technical issues they should call [phone]    How would you like to obtain your AVS? MyChart    Are changes needed to the allergy or medication list? N/A  Please remove any meds marked not taking and any flagged for removal.    Reason for visit: RECHECK    Wt/ht other than 24 hrs:  estimation  Pain more than one location:  hip and knee pain, 5   Yesenia Thomas VVF     Caregiver thinks he has depression due to sleeping all the time and canceling things.

## 2024-02-26 NOTE — TELEPHONE ENCOUNTER
Called and spoke with pharmacy in regards to Zepbound. Advised that patient has switched form Wegovy to Zepbound. No further questions.

## 2024-02-27 ENCOUNTER — OFFICE VISIT (OUTPATIENT)
Dept: FAMILY MEDICINE | Facility: CLINIC | Age: 44
End: 2024-02-27
Payer: COMMERCIAL

## 2024-02-27 VITALS
BODY MASS INDEX: 42.66 KG/M2 | DIASTOLIC BLOOD PRESSURE: 73 MMHG | OXYGEN SATURATION: 97 % | TEMPERATURE: 97.8 F | SYSTOLIC BLOOD PRESSURE: 119 MMHG | RESPIRATION RATE: 20 BRPM | WEIGHT: 315 LBS | HEIGHT: 72 IN | HEART RATE: 93 BPM

## 2024-02-27 DIAGNOSIS — B35.3 TINEA PEDIS OF RIGHT FOOT: ICD-10-CM

## 2024-02-27 DIAGNOSIS — G89.4 CHRONIC PAIN SYNDROME: Primary | ICD-10-CM

## 2024-02-27 DIAGNOSIS — R53.83 OTHER FATIGUE: ICD-10-CM

## 2024-02-27 DIAGNOSIS — D64.9 ANEMIA, UNSPECIFIED TYPE: ICD-10-CM

## 2024-02-27 LAB
ERYTHROCYTE [DISTWIDTH] IN BLOOD BY AUTOMATED COUNT: 12.8 % (ref 10–15)
HCT VFR BLD AUTO: 38.9 % (ref 40–53)
HGB BLD-MCNC: 12.8 G/DL (ref 13.3–17.7)
MCH RBC QN AUTO: 29.2 PG (ref 26.5–33)
MCHC RBC AUTO-ENTMCNC: 32.9 G/DL (ref 31.5–36.5)
MCV RBC AUTO: 89 FL (ref 78–100)
PLATELET # BLD AUTO: 264 10E3/UL (ref 150–450)
RBC # BLD AUTO: 4.39 10E6/UL (ref 4.4–5.9)
WBC # BLD AUTO: 12.4 10E3/UL (ref 4–11)

## 2024-02-27 PROCEDURE — 83540 ASSAY OF IRON: CPT | Performed by: FAMILY MEDICINE

## 2024-02-27 PROCEDURE — 83550 IRON BINDING TEST: CPT | Performed by: FAMILY MEDICINE

## 2024-02-27 PROCEDURE — 99214 OFFICE O/P EST MOD 30 MIN: CPT | Performed by: FAMILY MEDICINE

## 2024-02-27 PROCEDURE — 85027 COMPLETE CBC AUTOMATED: CPT | Performed by: FAMILY MEDICINE

## 2024-02-27 PROCEDURE — 82607 VITAMIN B-12: CPT | Performed by: FAMILY MEDICINE

## 2024-02-27 PROCEDURE — 36415 COLL VENOUS BLD VENIPUNCTURE: CPT | Performed by: FAMILY MEDICINE

## 2024-02-27 RX ORDER — CELECOXIB 100 MG/1
100 CAPSULE ORAL 2 TIMES DAILY
Qty: 60 CAPSULE | Refills: 1 | Status: SHIPPED | OUTPATIENT
Start: 2024-02-27

## 2024-02-27 RX ORDER — NYSTATIN AND TRIAMCINOLONE ACETONIDE 100000; 1 [USP'U]/G; MG/G
CREAM TOPICAL 2 TIMES DAILY
Qty: 60 G | Refills: 0 | Status: SHIPPED | OUTPATIENT
Start: 2024-02-27 | End: 2024-02-27

## 2024-02-27 RX ORDER — NYSTATIN AND TRIAMCINOLONE ACETONIDE 100000; 1 [USP'U]/G; MG/G
CREAM TOPICAL 2 TIMES DAILY
Qty: 60 G | Refills: 0 | Status: SHIPPED | OUTPATIENT
Start: 2024-02-27

## 2024-02-27 RX ORDER — FERROUS SULFATE 325(65) MG
325 TABLET ORAL
Qty: 30 TABLET | Refills: 2 | Status: SHIPPED | OUTPATIENT
Start: 2024-02-27

## 2024-02-27 ASSESSMENT — ASTHMA QUESTIONNAIRES
ACT_TOTALSCORE: 21
QUESTION_4 LAST FOUR WEEKS HOW OFTEN HAVE YOU USED YOUR RESCUE INHALER OR NEBULIZER MEDICATION (SUCH AS ALBUTEROL): NOT AT ALL
QUESTION_2 LAST FOUR WEEKS HOW OFTEN HAVE YOU HAD SHORTNESS OF BREATH: ONCE A DAY
ACT_TOTALSCORE: 21
QUESTION_5 LAST FOUR WEEKS HOW WOULD YOU RATE YOUR ASTHMA CONTROL: WELL CONTROLLED
QUESTION_1 LAST FOUR WEEKS HOW MUCH OF THE TIME DID YOUR ASTHMA KEEP YOU FROM GETTING AS MUCH DONE AT WORK, SCHOOL OR AT HOME: NONE OF THE TIME
QUESTION_3 LAST FOUR WEEKS HOW OFTEN DID YOUR ASTHMA SYMPTOMS (WHEEZING, COUGHING, SHORTNESS OF BREATH, CHEST TIGHTNESS OR PAIN) WAKE YOU UP AT NIGHT OR EARLIER THAN USUAL IN THE MORNING: NOT AT ALL

## 2024-02-27 ASSESSMENT — ANXIETY QUESTIONNAIRES
IF YOU CHECKED OFF ANY PROBLEMS ON THIS QUESTIONNAIRE, HOW DIFFICULT HAVE THESE PROBLEMS MADE IT FOR YOU TO DO YOUR WORK, TAKE CARE OF THINGS AT HOME, OR GET ALONG WITH OTHER PEOPLE: SOMEWHAT DIFFICULT
GAD7 TOTAL SCORE: 0
3. WORRYING TOO MUCH ABOUT DIFFERENT THINGS: NOT AT ALL
5. BEING SO RESTLESS THAT IT IS HARD TO SIT STILL: NOT AT ALL
1. FEELING NERVOUS, ANXIOUS, OR ON EDGE: NOT AT ALL
6. BECOMING EASILY ANNOYED OR IRRITABLE: NOT AT ALL
7. FEELING AFRAID AS IF SOMETHING AWFUL MIGHT HAPPEN: NOT AT ALL
GAD7 TOTAL SCORE: 0
4. TROUBLE RELAXING: NOT AT ALL
7. FEELING AFRAID AS IF SOMETHING AWFUL MIGHT HAPPEN: NOT AT ALL
2. NOT BEING ABLE TO STOP OR CONTROL WORRYING: NOT AT ALL
GAD7 TOTAL SCORE: 0
8. IF YOU CHECKED OFF ANY PROBLEMS, HOW DIFFICULT HAVE THESE MADE IT FOR YOU TO DO YOUR WORK, TAKE CARE OF THINGS AT HOME, OR GET ALONG WITH OTHER PEOPLE?: SOMEWHAT DIFFICULT

## 2024-02-27 ASSESSMENT — PATIENT HEALTH QUESTIONNAIRE - PHQ9
SUM OF ALL RESPONSES TO PHQ QUESTIONS 1-9: 10
SUM OF ALL RESPONSES TO PHQ QUESTIONS 1-9: 10

## 2024-02-27 NOTE — PROGRESS NOTES
Assessment & Plan     Chronic pain syndrome  History of arthritis.  He was previously followed by sports medicine, refractory to corticosteroid injection.  Limited improvement after viscosupplementation.  Trialed meloxicam with limited improvement, will switch to Celebrex to see if there is any any additional benefit.  Also would like to consultation with pain management appreciate your help.  He is starting physical therapy and has been working with endocrinology and losing weight steadily on GLP-1's.  - Pain Management  Referral  - celecoxib (CELEBREX) 100 MG capsule  Dispense: 60 capsule; Refill: 1    Anemia, unspecified type  Continue to be anemic, start iron supplementation, they are wondering about hypovitaminosis B12, will pursue additional lab testing as per below.  He will follow-up with me in a month for recheck.  - ferrous sulfate (FEROSUL) 325 (65 Fe) MG tablet  Dispense: 30 tablet; Refill: 2  - CBC with platelets    Other fatigue  - Iron and iron binding capacity  - Vitamin B12  - Iron and iron binding capacity  - Vitamin B12    Tinea pedis of right foot  Start mycolog  - nystatin-triamcinolone (MYCOLOG II) 161849-4.1 UNIT/GM-% external cream; Apply topically 2 times daily    Subjective   Willard is a 43 year old, presenting for the following health issues:  Fatigue (Concerns with fatigue) and Rash (Rash on right foot)        2/27/2024     1:43 PM   Additional Questions   Roomed by Kaitlin Berman     History of Present Illness       Reason for visit:  Fatigue,foot rash,knee/hip pain possible change arthritis med,possible B12 injections /folic acid for fatigue    He eats 0-1 servings of fruits and vegetables daily.He consumes 4 sweetened beverage(s) daily.He exercises with enough effort to increase his heart rate 9 or less minutes per day.  He exercises with enough effort to increase his heart rate 3 or less days per week.   He is taking medications regularly.     Patient is still continues to  have multiple joint pain despite losing weight, has concerns for itching and occasionally painful rash on the bottom of his right feet.  He would like to trial change of arthritis meds and would like to discuss alternative viscosupplementation that worked better for his family members.    Also continues to have daytime fatigue despite using his CPAP.      Objective    /73 (BP Location: Right arm, Patient Position: Chair, Cuff Size: Adult Large)   Pulse 93   Temp 97.8  F (36.6  C) (Oral)   Resp 20   Ht 1.829 m (6')   Wt (!) 166.5 kg (367 lb)   SpO2 97%   BMI 49.77 kg/m    Body mass index is 49.77 kg/m .  Physical Exam  Constitutional:       Appearance: He is not ill-appearing.   Cardiovascular:      Rate and Rhythm: Normal rate and regular rhythm.   Pulmonary:      Effort: Pulmonary effort is normal.      Breath sounds: Normal breath sounds.   Skin:     Comments: Dry scaly erythematous rash on the plantar aspect of the right foot that extends to the lateral borders                    Signed Electronically by: Ciro Love MD

## 2024-02-28 ENCOUNTER — TELEPHONE (OUTPATIENT)
Dept: FAMILY MEDICINE | Facility: CLINIC | Age: 44
End: 2024-02-28
Payer: COMMERCIAL

## 2024-02-28 NOTE — TELEPHONE ENCOUNTER
Jose Mckeon care coordinator for  specialized home health care. I do not see CTC-- says Dr. Love says topical lotion for rash on foot. Thought was paper script below so she was confused when picked up pills and not cream.    Disp Refills Start End HARMEET   celecoxib (CELEBREX) 100 MG capsule          The cream was sent to [facility] electronic ;y.     Advised her to check with guardian to update CTC.     Traci Acosta R.N.

## 2024-02-29 LAB
IRON BINDING CAPACITY (ROCHE): 282 UG/DL (ref 240–430)
IRON SATN MFR SERPL: 35 % (ref 15–46)
IRON SERPL-MCNC: 98 UG/DL (ref 61–157)
VIT B12 SERPL-MCNC: 395 PG/ML (ref 232–1245)

## 2024-03-07 ENCOUNTER — TELEPHONE (OUTPATIENT)
Dept: ENDOCRINOLOGY | Facility: CLINIC | Age: 44
End: 2024-03-07
Payer: COMMERCIAL

## 2024-03-08 DIAGNOSIS — E66.01 CLASS 3 SEVERE OBESITY DUE TO EXCESS CALORIES WITH SERIOUS COMORBIDITY AND BODY MASS INDEX (BMI) OF 50.0 TO 59.9 IN ADULT (H): ICD-10-CM

## 2024-03-08 DIAGNOSIS — E66.813 CLASS 3 SEVERE OBESITY DUE TO EXCESS CALORIES WITH SERIOUS COMORBIDITY AND BODY MASS INDEX (BMI) OF 50.0 TO 59.9 IN ADULT (H): ICD-10-CM

## 2024-03-11 ENCOUNTER — OFFICE VISIT (OUTPATIENT)
Dept: PALLIATIVE MEDICINE | Facility: CLINIC | Age: 44
End: 2024-03-11
Attending: FAMILY MEDICINE
Payer: COMMERCIAL

## 2024-03-11 VITALS — HEART RATE: 74 BPM | SYSTOLIC BLOOD PRESSURE: 130 MMHG | OXYGEN SATURATION: 98 % | DIASTOLIC BLOOD PRESSURE: 86 MMHG

## 2024-03-11 DIAGNOSIS — G89.4 CHRONIC PAIN SYNDROME: ICD-10-CM

## 2024-03-11 DIAGNOSIS — M17.0 PRIMARY OSTEOARTHRITIS OF BOTH KNEES: Primary | ICD-10-CM

## 2024-03-11 PROCEDURE — 99203 OFFICE O/P NEW LOW 30 MIN: CPT | Performed by: NURSE PRACTITIONER

## 2024-03-11 ASSESSMENT — PAIN SCALES - PAIN ENJOYMENT GENERAL ACTIVITY SCALE (PEG)
AVG_PAIN_PASTWEEK: 8
INTERFERED_ENJOYMENT_LIFE: 10 - COMPLETELY INTERFERES
PEG_TOTALSCORE: 9.33
PEG_TOTALSCORE: 9.33
INTERFERED_GENERAL_ACTIVITY: 10 - COMPLETELY INTERFERES
INTERFERED_ENJOYMENT_LIFE: 10
AVG_PAIN_PASTWEEK: 8
INTERFERED_GENERAL_ACTIVITY: 10

## 2024-03-11 ASSESSMENT — PAIN SCALES - GENERAL: PAINLEVEL: EXTREME PAIN (8)

## 2024-03-11 NOTE — PROGRESS NOTES
[facility] Pain Management     Date of visit: Mar 11, 2024      Consultation: Bristolemely Greenwood is a 43 year old male who has a past medical history of Arthritis, Asthma, Asthma, Patel's esophagus, Chronic pain, Chronic pain - left hip/leg pain, Gastroesophageal reflux disease, History of anesthesia complications, Hypertension, Hypertension, Leukocytosis, LPRD (laryngopharyngeal reflux disease), Marijuana abuse, Marijuana abuse, MDD (major depressive disorder), MDD (major depressive disorder), Mental retardation, mild (I.Q. 50-70), 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 Kimo is being seen today at the request of Ciro Love for evaluation of his pain issues and recommendations for management.     Referral placed: 2/27/2024  Referred By    Ciro Love MD   [id number] [id number]   Phone: [phone]   Fax: [phone]    Diagnoses: Chronic pain syndrome   Order: Pain Management  Referral      Referring provider comments:   Chronic pain syndrome  History of arthritis.  He was previously followed by sports medicine, refractory to corticosteroid injection.  Limited improvement after viscosupplementation.  Trialed meloxicam with limited improvement, will switch to Celebrex to see if there is any any additional benefit.  Also would like to consultation with pain management appreciate your help.  He is starting physical therapy and has been working with endocrinology and losing weight steadily on GLP-1's.    Relevant records/History:  I have reviewed available medical information in the patient's electronic medical record including relevant provider notes, laboratory work, and imaging.   ____________________________________________________________  History of Present Illness     Kimo reports:  - Here with sienna Spangler  "coordinator.   - Has had extensive treatments with [facility] for bilateral knee pain. Is not a candidate for replacement at this time.   - History of left THR, trying to slowly get his weight down to 350 pounds so that Dr. Mayer will replace his right hip.  - Here today with goal of finding out if [facility] has other types of viscosupplementation than [facility]. He had \"Gel One\" injections at [facility] that did not help in June of 2020 and January 2021. Has had steroid injections since that time, but they only last 30-60 days. Wanted to find out if he could try a different gel as his mother had relief with a different brand a t \"Joint relief clinic,\" but it does not take his medicare insurance.   -Pain is stopping him from \"everything,\" has significant mobility issues due to joint pain and OA in his feet, knees and hips.    - Switched from meloxicam to celebrex 2 weeks ago, no change in symptoms. Elevated creatinine, feels like options are limited.     Current pain medications:  Celebrex 100 mg BID- seems about the same as meloxicam    Other significant medications:  Cymbalta 120 mg every day   Myrbetriq 50 mg every day   Olanzapine-samidorphan 10-10 mg at bedtime  Minipress 3 mg at bedtime     Review of [place] Prescription Monitoring Program (): Last viewed on 3/11/2024.  No chronic controlled medications are being prescribed.    Pain description:  Location:     Quality:   sharp   Duration:  constant   Severity/Intensity (0 = No pain to 10 = Worst pain imaginable)   Now:Extreme Pain (8), Average: 8, Best: 7, Worst: 9  Aggravating factors include: standing, walking, exercise  Relieving factors include: lying down, sitting    PEG: A Three-Item Scale Assessing Pain Intensity and Interference  What number best describes your pain on average in the past week? 8  What number best describes how, during the past week, pain has interfered with your ENJOYMENT OF LIFE? 10 - Completely interferes  What number best describes how, during the " "past week, pain has interfered with your GENERAL ACTIVITY? 10 - Completely interferes  PEG Total Score: 9.33    PAIN MANAGEMENT TREATMENT HISTORY  1. MEDICATIONS:  Opioids: oxycodone \"didn't like how it made me feel\", T#3, fentanyl patch, MS Contin  NSAIDs: Ibuprofen, Celecoxib, and Naproxen    Muscle relaxants: cyclobenzaprine   Pain Antidepressants/ Anxiolytics: Fluoxetine, Abilify, Cymbalta, Effexor, valium, ativan, Ambien  Neuroleptics: Topiramate    Topical: diclofenac gel, lidocaine patches  Adjuvant medications: Acetaminophen  2. PHYSICAL THERAPY:   last completed 11/19/2019 for neck and back pain  3. PAIN PSYCHOLOGY:   Has not tried  4. SURGERY:   left hip replacement with Dr. Randall with [facility] 2017 with revision 6/24/19   5. INJECTIONS:   CSI into bilateral knees every 3 months at [facility], last in 12/2023 6/2020, 1/2021 \"Gel One\" injection to bilateral knees at [facility] - -not helpful  Right intraarticular hip injecion 3/9/2020 with Dr. Byers  Left C3,4,5 medial branch blocks 10/2/2019 with Dr. Byers -not helpful  bilateral L4-S1 MBB with Dr. Gilbert on 1/30/18 -not helpful  L4-5 ILESI with Dr. Juarez on 11/23/16  6. COMPLEMENTARY THERAPY:  Chiropractic: Tried, helpful with short term relief. Last in 2020  Acupuncture/acupressure: Has not tried  TENS unit: Has not tried  heat/cold applications: Tried, not helpful  7. PREVIOUS PAIN CLINIC:  Kimo has been seen at a pain clinic in the past. 2018 with [facility], [facility], injections with Dr. Byers 2019, Dr. Coleman 2017      Past Medical History   He has a past medical history of Arthritis, Asthma, Asthma, Patel's esophagus, Chronic pain, Chronic pain - left hip/leg pain, Gastroesophageal reflux disease, History of anesthesia complications, Hypertension, Hypertension, Leukocytosis, LPRD (laryngopharyngeal reflux disease), Marijuana abuse, Marijuana abuse, MDD (major depressive disorder), MDD (major depressive disorder), Mental retardation, mild " (I.Q. 50-70), 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   Past Surgical History   He has a past surgical history that includes Colonoscopy (N/A, 10/30/2015); Esophagoscopy, gastroscopy, duodenoscopy (EGD), combined (N/A, 11/17/2016); Colonoscopy (N/A, 11/17/2016); Exam under anesthesia, fulgurate condyloma anus, combined (N/A, 12/22/2016); Arthroplasty hip (Left, 1/25/2017); Bladder surgery; Closed reduction hip (Left, 6/10/2019); Arthroplasty revision hip (Left, 6/24/2019); Abdomen surgery; Esophagoscopy, gastroscopy, duodenoscopy (EGD), combined (N/A, 3/12/2020); Esophagoscopy, gastroscopy, duodenoscopy (EGD), combined (N/A, 10/28/2020); Colonoscopy (N/A, 10/28/2020); Arthroplasty hip (Left); Esophagoscopy, gastroscopy, duodenoscopy (EGD), combined; Colonoscopy; other surgical history; Pr Cystourethroscopy Inj Chemodenervation Bladder (N/A, 1/16/2019); joint replacement (Left, 2017); Pr Implant Periph/Gastric Neurostim/ (N/A, 1/8/2020); and Esophagoscopy, gastroscopy, duodenoscopy (EGD), combined (N/A, 11/29/2023).   Social History   He reports that he has been smoking vaping device and cigarettes. He has a 1 pack-year smoking history. He has been exposed to tobacco smoke. His smokeless tobacco use includes chew. He reports current alcohol use. He reports current drug use. Drug: Marijuana.  Social History     Social History Narrative    Not on file      Medications and Allergies reviewed.  Medications    has a current medication list which includes the following prescription(s): albuterol, fiber-lax, celecoxib, duloxetine, ferrous sulfate, hydrochlorothiazide, lisinopril, mirabegron, nicotine, nicotine, nicotine, nystatin-triamcinolone, lybalvi, order for dme, oxybutynin er, pantoprazole, prazosin, semaglutide-weight management, and tirzepatide-weight  management.  Allergies   Other [no clinical screening - see comments]  Objective   /86 (BP Location: Right arm, Patient Position: Chair, Cuff Size: Adult Regular)   Pulse 74   SpO2 98%   Constitutional: Well developed, well nourished, appears stated age. No acute distress.  Gait is antalgic, unsteady, and uses cane  HEENT: Head atraumatic, normocephalic. Eyes without conjunctival injection or jaundice. Neck supple.   Skin: No obvious rash, lesions, or petechiae of exposed skin.   Extremities: Peripheral pulses intact. No clubbing, cyanosis, or edema. Moves all extremities.  Psychiatric/mental status: Alert, without lethargy or stupor. Speech fluent. Appropriate affect. Mood normal. Able to follow commands without difficulty.   Musculoskeletal exam: Normal bulk and tone. Large body habitus   Significant Results and Procedures    Imaging:  None available for review, all done at [facility]  Labs:  Creatinine   Date Value Ref Range Status   01/16/2024 1.18 (H) 0.67 - 1.17 mg/dL Final     AST   Date Value Ref Range Status   06/27/2023 17 0 - 45 U/L Final     Comment:     Reference intervals for this test were updated on 6/12/2023 to more accurately reflect our healthy population. There may be differences in the flagging of prior results with similar values performed with this method. Interpretation of those prior results can be made in the context of the updated reference intervals.     ALT   Date Value Ref Range Status   06/27/2023 31 0 - 70 U/L Final     Comment:     Reference intervals for this test were updated on 6/12/2023 to more accurately reflect our healthy population. There may be differences in the flagging of prior results with similar values performed with this method. Interpretation of those prior results can be made in the context of the updated reference intervals.           Assessment & Plan   Chronic pain syndrome  Primary osteoarthritis of both knees  Referral placed to orthopedics to discuss options  for visco supplementation.Briefly discussed genicular nerve block to RFA, information provided on AVS. Reprinted pool therapy order for care coordinator to help facilitate scheduling (has been calling back and forth, but [facility] did not have the order.)   - Orthopedic  Referral    No follow-up planned at this time.     Joselin Dowling, CNP-BC, PMGT-BC, AP-PMN  [facility]

## 2024-03-11 NOTE — PATIENT INSTRUCTIONS
Referral to orthopedics to discuss other types of injections.  Could consider a genicular nerve block to stop the pain signal from your knees. You would need to do one knee at a time. See information below:    What is a Genicular Nerve Block?  The set of nerves the supply sensation to the knee are called genicular nerves, and they are responsible for transmitting pain from the knee. A geniculate block knee treatment attempts to stop all or most of the pain by blocking the nerve supply. The procedure involves a physician injecting a local anesthetic into three genicular nerves to interrupt pain signals while using fluoroscopic (live X-ray).     Genicular Nerve Radiofrequency Ablation  The knee block procedure is primarily used as a diagnostic treatment to determine which nerves are causing the pain and if nerve blocking works. If the nerve block is successful, pain relief begins within an hour, but the relief is short-lived. The nerve block s effects last from eight hours to a maximum of 24 hours. If the first block works, the diagnostic test is performed a second time before the next step is taken. Generally, if pain is reduced by at very significant amount both times, the physician knows that blocking certain nerves does work and will proceed with a longer-lasting treatment called a radiofrequency ablation of genicular nerves.    Best Candidates for the Nerve Block Procedure  The people who are most likely to be candidates for the nerve block in the knee are those who:  had a total or partial knee replacement, and the pain continued after the surgery,  want knee surgery as a last resort option,  have health issues that make knee replacement surgery too risky,  have chronic knee pain due to degenerative joint disease (osteoarthritis),  have a knee injury marked by chronic painful inflammation  need pain relief before or after knee surgery.

## 2024-03-12 ENCOUNTER — TELEPHONE (OUTPATIENT)
Dept: FAMILY MEDICINE | Facility: CLINIC | Age: 44
End: 2024-03-12

## 2024-03-12 NOTE — TELEPHONE ENCOUNTER
The referral for pool therapy was placed by Endo provider. Will forward message over to that provider to advise.    Judy ALEXANDER RN

## 2024-03-12 NOTE — TELEPHONE ENCOUNTER
"Katy with [facility] calling, states that they received a request that patient needs a \"Cold Therapy\" but haven't received the order.     Only Pool Therapy referral is in place. Does patient needs cold therapy?    Please advise.     [facility] fax number: [phone]  "

## 2024-03-13 NOTE — CONFIDENTIAL NOTE
Called and left message for patient in regards to therapy orders. BRICE ordered pool therapy. Request came through for cold therapy. Asked patient to return call for clarification of what is needed.

## 2024-03-14 NOTE — PROGRESS NOTES
"ASSESSMENT & PLAN  There are no Patient Instructions on file for this visit.  -----    SUBJECTIVE  Kimo Greenwood is a/an 43 year old male who is seen {FSOC CONSULT:001625} for evaluation of {Right Left Bilateral:212011} knee pain. The patient is seen {sjaparent:791561}.    Onset: {sjadays/weeks/months/years:081221}. {Precipitating Event:311622}  Location of Pain: {side:5002} ***  Rating of Pain at worst: {PAIN SCALE:469792}  Rating of Pain Currently: {PAIN SCALE:738835}  Worsened by: ***  Better with: ***  Treatments tried: {sjatreatmentoptions:802289}  Associated symptoms: {thassociatedsx:289461}  Orthopedic history: {YES - DATE/NO:742322::\"NO\"}  Relevant surgical history: {YES - DATE/NO:902560::\"NO\"}  Social history: {social history:928201:::1}    Past Medical History:   Diagnosis Date    Arthritis     DDD hips and knees    Asthma     Asthma     Patel's esophagus     Chronic pain     Chronic pain - left hip/leg pain     degenerative disc disease, back, hips, knees    Gastroesophageal reflux disease     History of anesthesia complications     agitation x1 after interstim 2013    Hypertension     Hypertension     Leukocytosis     LPRD (laryngopharyngeal reflux disease)     Marijuana abuse     Marijuana abuse     MDD (major depressive disorder)     MDD (major depressive disorder)     Mental retardation, mild (I.Q. 50-70)     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    With associated speech/language delay    Obesity 11/11/2010    LOREN (obstructive sleep apnea)     Uses a CPAP    LOREN (obstructive sleep apnea)     Seborrheic dermatitis     Seborrheic dermatitis     Sleep apnea     CPAP     Social History     Socioeconomic History    Marital status: Single   Tobacco Use    Smoking status: Every Day     Packs/day: 1.00     Years: 1.00     Additional pack years: 0.00     Total pack years: 1.00     Types: Vaping Device, Cigarettes     Passive exposure: Current    Smokeless tobacco: Current     Types: Chew    " "Tobacco comments:     Smokes E Cigs equal to 1 PPD   Vaping Use    Vaping Use: Every day    Substances: Nicotine, Flavoring    Devices: Refillable tank   Substance and Sexual Activity    Alcohol use: Yes     Comment: socially--\"not very often\" \"once a month\"    Drug use: Yes     Types: Marijuana     Comment: patient denies any marijuana use    Sexual activity: Yes     Partners: Female     Birth control/protection: Condom   Other Topics Concern    Parent/sibling w/ CABG, MI or angioplasty before 65F 55M? No     Social Determinants of Health     Financial Resource Strain: Low Risk  (12/12/2023)    Financial Resource Strain     Within the past 12 months, have you or your family members you live with been unable to get utilities (heat, electricity) when it was really needed?: No   Recent Concern: Financial Resource Strain - High Risk (10/10/2023)    Financial Resource Strain     Within the past 12 months, have you or your family members you live with been unable to get utilities (heat, electricity) when it was really needed?: Yes   Food Insecurity: Low Risk  (12/12/2023)    Food Insecurity     Within the past 12 months, did you worry that your food would run out before you got money to buy more?: No     Within the past 12 months, did the food you bought just not last and you didn t have money to get more?: No   Transportation Needs: Low Risk  (12/12/2023)    Transportation Needs     Within the past 12 months, has lack of transportation kept you from medical appointments, getting your medicines, non-medical meetings or appointments, work, or from getting things that you need?: No   Interpersonal Safety: Not At Risk (12/30/2022)    Humiliation, Afraid, Rape, and Kick questionnaire     Fear of Current or Ex-Partner: No     Emotionally Abused: No     Physically Abused: No     Sexually Abused: No   Housing Stability: Low Risk  (12/12/2023)    Housing Stability     Do you have housing? : Yes     Are you worried about losing " "your housing?: No         Patient's past medical, surgical, social, and family histories were reviewed today {SBDROSEXCEPTIONS:382194}    REVIEW OF SYSTEMS:  10 point ROS is negative other than symptoms noted above in HPI, Past Medical History or as stated below  Constitutional: NEGATIVE for fever, chills, change in weight  Skin: NEGATIVE for worrisome rashes, moles or lesions  GI/: NEGATIVE for bowel or bladder changes  Neuro: NEGATIVE for weakness, dizziness or paresthesias    OBJECTIVE:  There were no vitals taken for this visit.   General: healthy, alert and in no distress  HEENT: no scleral icterus or conjunctival erythema  Skin: no suspicious lesions or rash. No jaundice.  CV: no pedal edema  Resp: normal respiratory effort without conversational dyspnea   Psych: normal mood and affect  Gait: normal steady gait with appropriate coordination and balance  Neuro: Normal light sensory exam of lower extremity  MSK:  {LEFT/RIGHT CAPS:921938} KNEE  Inspection:    normal alignment  Palpation:    Tender about the {FSOC KNEE TENDERNESS:744553:a}. Remainder of bony and ligamentous landmarks are nontender.    {Effusion Size:802900} effusion is present    Patellofemoral crepitus is {PRESENT:072314::\"Present\"}  Range of Motion:     {FSOC ROM KNEE:601737}0 extension to {FSOC ROM KNEE:512486}0 flexion  Strength:    {FSOC KNEE STRENGTH REV:652179}    Extensor mechanism intact  Special Tests:    Positive: {FSOC KNEE TESTS REVISED:548299}    Negative: {FSOC KNEE TESTS REVISED:206641}    Independent visualization of the below image:  No results found for this or any previous visit (from the past 24 hour(s)).      Patient's conditions were thoroughly discussed during today's visit with total time spent face-to-face with the patient and documentation being *** minutes.    Albert Yeo MD [facility]    "

## 2024-03-14 NOTE — TELEPHONE ENCOUNTER
I don't prescribe this for him.  Urology notes (8/4/21) show that Myrbetriq was not covered and too expensive so I don't know if he is taking it.  They also then tried oxybutinin.  You can call his mother to clarify or contact [facility] to see what medication he is supposed to be taking currently.   Adult

## 2024-03-15 ENCOUNTER — OFFICE VISIT (OUTPATIENT)
Dept: ORTHOPEDICS | Facility: CLINIC | Age: 44
End: 2024-03-15
Payer: COMMERCIAL

## 2024-03-15 VITALS
DIASTOLIC BLOOD PRESSURE: 76 MMHG | HEIGHT: 72 IN | SYSTOLIC BLOOD PRESSURE: 117 MMHG | WEIGHT: 315 LBS | BODY MASS INDEX: 42.66 KG/M2

## 2024-03-15 DIAGNOSIS — E66.01 CLASS 3 SEVERE OBESITY WITH SERIOUS COMORBIDITY AND BODY MASS INDEX (BMI) OF 50.0 TO 59.9 IN ADULT, UNSPECIFIED OBESITY TYPE (H): ICD-10-CM

## 2024-03-15 DIAGNOSIS — E66.813 CLASS 3 SEVERE OBESITY WITH SERIOUS COMORBIDITY AND BODY MASS INDEX (BMI) OF 50.0 TO 59.9 IN ADULT, UNSPECIFIED OBESITY TYPE (H): ICD-10-CM

## 2024-03-15 DIAGNOSIS — M17.0 PRIMARY OSTEOARTHRITIS OF BOTH KNEES: Primary | ICD-10-CM

## 2024-03-15 PROCEDURE — 20611 DRAIN/INJ JOINT/BURSA W/US: CPT | Mod: 50 | Performed by: FAMILY MEDICINE

## 2024-03-15 PROCEDURE — 99204 OFFICE O/P NEW MOD 45 MIN: CPT | Mod: 25 | Performed by: FAMILY MEDICINE

## 2024-03-15 RX ORDER — ROPIVACAINE HYDROCHLORIDE 5 MG/ML
4 INJECTION, SOLUTION EPIDURAL; INFILTRATION; PERINEURAL
Status: SHIPPED | OUTPATIENT
Start: 2024-03-15

## 2024-03-15 RX ORDER — METHYLPREDNISOLONE ACETATE 40 MG/ML
40 INJECTION, SUSPENSION INTRA-ARTICULAR; INTRALESIONAL; INTRAMUSCULAR; SOFT TISSUE
Status: SHIPPED | OUTPATIENT
Start: 2024-03-15

## 2024-03-15 RX ORDER — LIDOCAINE HYDROCHLORIDE 10 MG/ML
5 INJECTION, SOLUTION INFILTRATION; PERINEURAL
Status: SHIPPED | OUTPATIENT
Start: 2024-03-15

## 2024-03-15 RX ADMIN — ROPIVACAINE HYDROCHLORIDE 4 ML: 5 INJECTION, SOLUTION EPIDURAL; INFILTRATION; PERINEURAL at 13:48

## 2024-03-15 RX ADMIN — METHYLPREDNISOLONE ACETATE 40 MG: 40 INJECTION, SUSPENSION INTRA-ARTICULAR; INTRALESIONAL; INTRAMUSCULAR; SOFT TISSUE at 13:48

## 2024-03-15 RX ADMIN — LIDOCAINE HYDROCHLORIDE 5 ML: 10 INJECTION, SOLUTION INFILTRATION; PERINEURAL at 13:48

## 2024-03-15 NOTE — PROGRESS NOTES
ASSESSMENT & PLAN  Patient Instructions     1. Primary osteoarthritis of both knees    2. Class 3 severe obesity with serious comorbidity and body mass index (BMI) of 50.0 to 59.9 in adult, unspecified obesity type (H)      -Patient has chronic bilateral knee pain due to arthritis  -Patient has had treatments at [facility] with cortisone injections and Synvisc 1 injections.  Patient reports no relief from his previous Synvisc 1 injection  -Patient was interested in trying a different physical supplementation medication.  Prior authorization for Euflexxa was ordered today  -Patient has taken meloxicam and Celebrex in the past without relief.  Patient has performed pool therapy without relief as well.  Patient is ambulating with a cane or walker  -Patient tolerated bilateral knee intra-articular cortisone injection today without complications.  Patient was given postprocedure instruction  -Patient will follow-up in 2 to 3 weeks to administer the Euflexxa medication  -We also discussed potentially trying a geniculate nerve block and ablation if patient does not get relief from the Euflexxa medication  -Patient has been working on lowering his weight.  Patient sees a nutritionist and endocrinologist  -Call direct clinic number [[phone]] at any time with questions or concerns.    Albert Yeo MD [facility]  [facility]        -----    SUBJECTIVE  Kimo Greenwood is a/an 43 year old male who is seen in consultation at the request of  Charito Dowling N.P. for evaluation of bilateral knee pain. The patient is seen care coordinator.    Onset: Several years. Reports insidious onset without acute precipitating event.  Location of Pain: anterior knee pain bilaterally, posterior pain left knee   Rating of Pain at worst: 10/10  Rating of Pain Currently: 9/10  Worsened by: chiropractic care, walking, standing  Better with: injections  Treatments tried: corticosteroid  "injections in the past have only provided ~1 month of relief, viscosupplementation injections (they believe it was Synvsic One) have not provided much relief, Meloxicam, Celebrex, uses a cane or walker for ambulation, bracing, PT, topical patches, Voltaren gel. CSI normally only helps 1 month.  Associated symptoms: constant pain, swelling, episodes of instability  Orthopedic history: right hip OA  Relevant surgical history: History of left MARGO  Social history: not currently working    Past Medical History:   Diagnosis Date    Arthritis     DDD hips and knees    Asthma     Asthma     Patel's esophagus     Chronic pain     Chronic pain - left hip/leg pain     degenerative disc disease, back, hips, knees    Gastroesophageal reflux disease     History of anesthesia complications     agitation x1 after interstim 2013    Hypertension     Hypertension     Leukocytosis     LPRD (laryngopharyngeal reflux disease)     Marijuana abuse     Marijuana abuse     MDD (major depressive disorder)     MDD (major depressive disorder)     Mental retardation, mild (I.Q. 50-70)     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    With associated speech/language delay    Obesity 11/11/2010    LOREN (obstructive sleep apnea)     Uses a CPAP    LOREN (obstructive sleep apnea)     Seborrheic dermatitis     Seborrheic dermatitis     Sleep apnea     CPAP     Social History     Socioeconomic History    Marital status: Single   Tobacco Use    Smoking status: Every Day     Packs/day: 1.00     Years: 1.00     Additional pack years: 0.00     Total pack years: 1.00     Types: Vaping Device, Cigarettes     Passive exposure: Current    Smokeless tobacco: Current     Types: Chew    Tobacco comments:     Smokes E Cigs equal to 1 PPD   Vaping Use    Vaping Use: Every day    Substances: Nicotine, Flavoring    Devices: Refillable tank   Substance and Sexual Activity    Alcohol use: Yes     Comment: socially--\"not very often\" \"once a month\"    Drug use: Yes "     Types: Marijuana     Comment: patient denies any marijuana use    Sexual activity: Yes     Partners: Female     Birth control/protection: Condom   Other Topics Concern    Parent/sibling w/ CABG, MI or angioplasty before 65F 55M? No     Social Determinants of Health     Financial Resource Strain: Low Risk  (12/12/2023)    Financial Resource Strain     Within the past 12 months, have you or your family members you live with been unable to get utilities (heat, electricity) when it was really needed?: No   Recent Concern: Financial Resource Strain - High Risk (10/10/2023)    Financial Resource Strain     Within the past 12 months, have you or your family members you live with been unable to get utilities (heat, electricity) when it was really needed?: Yes   Food Insecurity: Low Risk  (12/12/2023)    Food Insecurity     Within the past 12 months, did you worry that your food would run out before you got money to buy more?: No     Within the past 12 months, did the food you bought just not last and you didn t have money to get more?: No   Transportation Needs: Low Risk  (12/12/2023)    Transportation Needs     Within the past 12 months, has lack of transportation kept you from medical appointments, getting your medicines, non-medical meetings or appointments, work, or from getting things that you need?: No   Interpersonal Safety: Not At Risk (12/30/2022)    Humiliation, Afraid, Rape, and Kick questionnaire     Fear of Current or Ex-Partner: No     Emotionally Abused: No     Physically Abused: No     Sexually Abused: No   Housing Stability: Low Risk  (12/12/2023)    Housing Stability     Do you have housing? : Yes     Are you worried about losing your housing?: No         Patient's past medical, surgical, social, and family histories were reviewed today and no changes are noted.    REVIEW OF SYSTEMS:  10 point ROS is negative other than symptoms noted above in HPI, Past Medical History or as stated  below  Constitutional: NEGATIVE for fever, chills, change in weight  Skin: NEGATIVE for worrisome rashes, moles or lesions  GI/: NEGATIVE for bowel or bladder changes  Neuro: NEGATIVE for weakness, dizziness or paresthesias    OBJECTIVE:  /76   Ht 1.829 m (6')   Wt (!) 166.5 kg (367 lb)   BMI 49.77 kg/m     General: healthy, alert and in no distress  HEENT: no scleral icterus or conjunctival erythema  Skin: no suspicious lesions or rash. No jaundice.  CV: no pedal edema  Resp: normal respiratory effort without conversational dyspnea   Psych: normal mood and affect  Gait: normal steady gait with appropriate coordination and balance  Neuro: Normal light sensory exam of lower extremity  MSK:  BILATERAL KNEE  Inspection:    normal alignment  Palpation:    Tender about the lateral patellar facet, medial patellar facet, lateral joint line, and medial joint line. Remainder of bony and ligamentous landmarks are nontender.    Mild effusion is present    Patellofemoral crepitus is Absent  Range of Motion:     00 extension to 1100 flexion  Strength:    Quadriceps grossly intact    Extensor mechanism intact  Special Tests:    Positive: NONE    Negative: MCL/valgus stress (0 & 30 deg), LCL/varus stress (0 & 30 deg), Lachman's, anterior drawer, posterior drawer, Alina's    Independent visualization of the below image:  No results found for this or any previous visit (from the past 24 hour(s)).      Large Joint Injection/Arthocentesis: bilateral knee    Date/Time: 3/15/2024 1:48 PM    Performed by: Yeo, Albert, MD  Authorized by: Yeo, Albert, MD    Indications:  Pain and osteoarthritis  Needle Size:  22 G  Guidance: ultrasound    Approach:  Anterolateral  Location:  Knee  Laterality:  Bilateral      Medications (Right):  40 mg methylPREDNISolone 40 MG/ML; 5 mL lidocaine 1 %; 4 mL ROPivacaine 5 MG/ML  Aspirate amount (mL):  10  Aspirate:  Serous and yellow  Medications (Left):  40 mg methylPREDNISolone 40 MG/ML; 4 mL  ROPivacaine 5 MG/ML  Outcome:  Tolerated well, no immediate complications  Procedure discussed: discussed risks, benefits, and alternatives    Consent Given by:  Patient  Timeout: timeout called immediately prior to procedure    Prep: patient was prepped and draped in usual sterile fashion     Ultrasound was used to ensure safe and accurate needle placement and injection. Ultrasound images of the procedure were permanently stored.          Albert Yeo MD [facility] [place]

## 2024-03-15 NOTE — PATIENT INSTRUCTIONS
1. Primary osteoarthritis of both knees    2. Class 3 severe obesity with serious comorbidity and body mass index (BMI) of 50.0 to 59.9 in adult, unspecified obesity type (H)      -Patient has chronic bilateral knee pain due to arthritis  -Patient has had treatments at [facility] with cortisone injections and Synvisc 1 injections.  Patient reports no relief from his previous Synvisc 1 injection  -Patient was interested in trying a different physical supplementation medication.  Prior authorization for Euflexxa was ordered today  -Patient has taken meloxicam and Celebrex in the past without relief.  Patient has performed pool therapy without relief as well.  Patient is ambulating with a cane or walker  -Patient tolerated bilateral knee intra-articular cortisone injection today without complications.  Patient was given postprocedure instruction  -Patient will follow-up in 2 to 3 weeks to administer the Euflexxa medication  -We also discussed potentially trying a geniculate nerve block and ablation if patient does not get relief from the Euflexxa medication  -Patient has been working on lowering his weight.  Patient sees a nutritionist and endocrinologist  -Call direct clinic number [[phone]] at any time with questions or concerns.    Albert Yeo MD [facility]  [facility]

## 2024-03-15 NOTE — LETTER
3/15/2024         RE: Kimo Greenwood  [address]  [place] [address]        Dear Colleague,    Thank you for referring your patient, Kimo Greenwood, to the [facility] [place]. Please see a copy of my visit note below.    ASSESSMENT & PLAN  Patient Instructions     1. Primary osteoarthritis of both knees    2. Class 3 severe obesity with serious comorbidity and body mass index (BMI) of 50.0 to 59.9 in adult, unspecified obesity type (H)      -Patient has chronic bilateral knee pain due to arthritis  -Patient has had treatments at [facility] with cortisone injections and Synvisc 1 injections.  Patient reports no relief from his previous Synvisc 1 injection  -Patient was interested in trying a different physical supplementation medication.  Prior authorization for Euflexxa was ordered today  -Patient has taken meloxicam and Celebrex in the past without relief.  Patient has performed pool therapy without relief as well.  Patient is ambulating with a cane or walker  -Patient tolerated bilateral knee intra-articular cortisone injection today without complications.  Patient was given postprocedure instruction  -Patient will follow-up in 2 to 3 weeks to administer the Euflexxa medication  -We also discussed potentially trying a geniculate nerve block and ablation if patient does not get relief from the Euflexxa medication  -Patient has been working on lowering his weight.  Patient sees a nutritionist and endocrinologist  -Call direct clinic number [[phone]] at any time with questions or concerns.    Albert Yeo MD [facility] and Sports Medicine  [facility]        -----    SUBJECTIVE  Kimo Greenwood is a/an 43 year old male who is seen in consultation at the request of  Charito Dowling N.P. for evaluation of bilateral knee pain. The patient is seen care coordinator.    Onset: Several years. Reports insidious onset without  acute precipitating event.  Location of Pain: anterior knee pain bilaterally, posterior pain left knee   Rating of Pain at worst: 10/10  Rating of Pain Currently: 9/10  Worsened by: chiropractic care, walking, standing  Better with: injections  Treatments tried: corticosteroid injections in the past have only provided ~1 month of relief, viscosupplementation injections (they believe it was Synvsic One) have not provided much relief, Meloxicam, Celebrex, uses a cane or walker for ambulation, bracing, PT, topical patches, Voltaren gel. CSI normally only helps 1 month.  Associated symptoms: constant pain, swelling, episodes of instability  Orthopedic history: right hip OA  Relevant surgical history: History of left MARGO  Social history: not currently working    Past Medical History:   Diagnosis Date     Arthritis     DDD hips and knees     Asthma      Asthma      Patel's esophagus      Chronic pain      Chronic pain - left hip/leg pain     degenerative disc disease, back, hips, knees     Gastroesophageal reflux disease      History of anesthesia complications     agitation x1 after interstim 2013     Hypertension      Hypertension      Leukocytosis      LPRD (laryngopharyngeal reflux disease)      Marijuana abuse      Marijuana abuse      MDD (major depressive disorder)      MDD (major depressive disorder)      Mental retardation, mild (I.Q. 50-70)      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    With associated speech/language delay     Obesity 11/11/2010     LOREN (obstructive sleep apnea)     Uses a CPAP     LOREN (obstructive sleep apnea)      Seborrheic dermatitis      Seborrheic dermatitis      Sleep apnea     CPAP     Social History     Socioeconomic History     Marital status: Single   Tobacco Use     Smoking status: Every Day     Packs/day: 1.00     Years: 1.00     Additional pack years: 0.00     Total pack years: 1.00     Types: Vaping Device, Cigarettes     Passive exposure: Current     Smokeless  "tobacco: Current     Types: Chew     Tobacco comments:     Smokes E Cigs equal to 1 PPD   Vaping Use     Vaping Use: Every day     Substances: Nicotine, Flavoring     Devices: Refillable tank   Substance and Sexual Activity     Alcohol use: Yes     Comment: socially--\"not very often\" \"once a month\"     Drug use: Yes     Types: Marijuana     Comment: patient denies any marijuana use     Sexual activity: Yes     Partners: Female     Birth control/protection: Condom   Other Topics Concern     Parent/sibling w/ CABG, MI or angioplasty before 65F 55M? No     Social Determinants of Health     Financial Resource Strain: Low Risk  (12/12/2023)    Financial Resource Strain      Within the past 12 months, have you or your family members you live with been unable to get utilities (heat, electricity) when it was really needed?: No   Recent Concern: Financial Resource Strain - High Risk (10/10/2023)    Financial Resource Strain      Within the past 12 months, have you or your family members you live with been unable to get utilities (heat, electricity) when it was really needed?: Yes   Food Insecurity: Low Risk  (12/12/2023)    Food Insecurity      Within the past 12 months, did you worry that your food would run out before you got money to buy more?: No      Within the past 12 months, did the food you bought just not last and you didn t have money to get more?: No   Transportation Needs: Low Risk  (12/12/2023)    Transportation Needs      Within the past 12 months, has lack of transportation kept you from medical appointments, getting your medicines, non-medical meetings or appointments, work, or from getting things that you need?: No   Interpersonal Safety: Not At Risk (12/30/2022)    Humiliation, Afraid, Rape, and Kick questionnaire      Fear of Current or Ex-Partner: No      Emotionally Abused: No      Physically Abused: No      Sexually Abused: No   Housing Stability: Low Risk  (12/12/2023)    Housing Stability      Do you " have housing? : Yes      Are you worried about losing your housing?: No         Patient's past medical, surgical, social, and family histories were reviewed today and no changes are noted.    REVIEW OF SYSTEMS:  10 point ROS is negative other than symptoms noted above in HPI, Past Medical History or as stated below  Constitutional: NEGATIVE for fever, chills, change in weight  Skin: NEGATIVE for worrisome rashes, moles or lesions  GI/: NEGATIVE for bowel or bladder changes  Neuro: NEGATIVE for weakness, dizziness or paresthesias    OBJECTIVE:  /76   Ht 1.829 m (6')   Wt (!) 166.5 kg (367 lb)   BMI 49.77 kg/m     General: healthy, alert and in no distress  HEENT: no scleral icterus or conjunctival erythema  Skin: no suspicious lesions or rash. No jaundice.  CV: no pedal edema  Resp: normal respiratory effort without conversational dyspnea   Psych: normal mood and affect  Gait: normal steady gait with appropriate coordination and balance  Neuro: Normal light sensory exam of lower extremity  MSK:  BILATERAL KNEE  Inspection:    normal alignment  Palpation:    Tender about the lateral patellar facet, medial patellar facet, lateral joint line, and medial joint line. Remainder of bony and ligamentous landmarks are nontender.    Mild effusion is present    Patellofemoral crepitus is Absent  Range of Motion:     00 extension to 1100 flexion  Strength:    Quadriceps grossly intact    Extensor mechanism intact  Special Tests:    Positive: NONE    Negative: MCL/valgus stress (0 & 30 deg), LCL/varus stress (0 & 30 deg), Lachman's, anterior drawer, posterior drawer, Alina's    Independent visualization of the below image:  No results found for this or any previous visit (from the past 24 hour(s)).      Large Joint Injection/Arthocentesis: bilateral knee    Date/Time: 3/15/2024 1:48 PM    Performed by: Yeo, Albert, MD  Authorized by: Yeo, Albert, MD    Indications:  Pain and osteoarthritis  Needle Size:  22  G  Guidance: ultrasound    Approach:  Anterolateral  Location:  Knee  Laterality:  Bilateral      Medications (Right):  40 mg methylPREDNISolone 40 MG/ML; 5 mL lidocaine 1 %; 4 mL ROPivacaine 5 MG/ML  Aspirate amount (mL):  10  Aspirate:  Serous and yellow  Medications (Left):  40 mg methylPREDNISolone 40 MG/ML; 4 mL ROPivacaine 5 MG/ML  Outcome:  Tolerated well, no immediate complications  Procedure discussed: discussed risks, benefits, and alternatives    Consent Given by:  Patient  Timeout: timeout called immediately prior to procedure    Prep: patient was prepped and draped in usual sterile fashion     Ultrasound was used to ensure safe and accurate needle placement and injection. Ultrasound images of the procedure were permanently stored.          Albert Yeo MD [facility]      Again, thank you for allowing me to participate in the care of your patient.        Sincerely,        Albert Yeo, MD

## 2024-03-15 NOTE — TELEPHONE ENCOUNTER
"      Semaglutide-Weight Management (WEGOVY) 2.4 MG/0.75ML pen       Last Written Prescription Date:  1/17/24  Last Fill Quantity: 3 ml ,   # refills: 3  Last Office Visit : 2/22/24  Future Office visit:  6/6/24        Per 2/22/24 note: transition to Zepbound 7.5mg once weekly. Stop Wegovy 2.4mg one week prior to starting Zepbound.   Confirmed 2/26/24\" Advised that patient has switched form Wegovy to Zepbound. \"      "

## 2024-03-18 NOTE — CONFIDENTIAL NOTE
Received call from representative on behalf of patient. Patient needs pool therapy referral faxed to [facility]. Sent to [phone].

## 2024-03-20 ENCOUNTER — VIRTUAL VISIT (OUTPATIENT)
Dept: FAMILY MEDICINE | Facility: CLINIC | Age: 44
End: 2024-03-20
Payer: COMMERCIAL

## 2024-03-20 DIAGNOSIS — R53.83 OTHER FATIGUE: Primary | ICD-10-CM

## 2024-03-20 DIAGNOSIS — G89.4 CHRONIC PAIN SYNDROME: ICD-10-CM

## 2024-03-20 PROCEDURE — 99213 OFFICE O/P EST LOW 20 MIN: CPT | Mod: 95 | Performed by: FAMILY MEDICINE

## 2024-03-20 NOTE — PROGRESS NOTES
Willard is a 43 year old who is being evaluated via a billable video visit.    How would you like to obtain your AVS? MyChart  If the video visit is dropped, the invitation should be resent by: Text to cell phone: [phone]  Will anyone else be joining your video visit? No      Assessment & Plan     Other fatigue  Possibly due to mild anemia, obesity, and psychotropic medications.  I encouraged him to discuss with his psychiatrist if Wellbutrin is helpful.    Chronic pain syndrome  Refractory with Celebrex, discussed increasing pain medications to tramadol, Dalia who is his legal guardian has declined due to his history of substance use disorder.  He does have an upcoming evaluation with sports medicine for repeat injections.      Subjective   Willard is a 43 year old, presenting for the following health issues:    Follow Up (fatigue)        3/20/2024     1:57 PM   Additional Questions   Roomed by Erin FORMAN     Video Start Time: 2:21 PM    HPI   Patient presents for follow-up of pain syndrome, having significant pain in both knees and hips refractory to injections and Celebrex.  He is agreeable to start pain medication, his legal guardian does not.  Subsequently, patient was very frustrated, ended conversation with Dalia and closed our video visit.      Objective           Vitals:  No vitals were obtained today due to virtual visit.    Physical Exam   GENERAL: alert and no distress  EYES: Eyes grossly normal to inspection.  No discharge or erythema, or obvious scleral/conjunctival abnormalities.  RESP: No audible wheeze, cough, or visible cyanosis.    SKIN: Visible skin clear. No significant rash, abnormal pigmentation or lesions.  NEURO: Cranial nerves grossly intact.  Mentation and speech appropriate for age.  PSYCH: Depressed most, stressed, frustrated          Video-Visit Details    Type of service:  Video Visit   Video End Time: 2:40 PM  Originating Location (pt. Location): Assisted Living    Distant Location  (provider location):  On-site  Platform used for Video Visit: Rodger  Signed Electronically by: Ciro Love MD

## 2024-03-27 ENCOUNTER — TELEPHONE (OUTPATIENT)
Dept: ENDOCRINOLOGY | Facility: CLINIC | Age: 44
End: 2024-03-27
Payer: COMMERCIAL

## 2024-03-27 DIAGNOSIS — E66.813 CLASS 3 SEVERE OBESITY DUE TO EXCESS CALORIES WITH SERIOUS COMORBIDITY AND BODY MASS INDEX (BMI) OF 50.0 TO 59.9 IN ADULT (H): Primary | ICD-10-CM

## 2024-03-27 DIAGNOSIS — E66.01 CLASS 3 SEVERE OBESITY DUE TO EXCESS CALORIES WITH SERIOUS COMORBIDITY AND BODY MASS INDEX (BMI) OF 50.0 TO 59.9 IN ADULT (H): Primary | ICD-10-CM

## 2024-03-27 NOTE — TELEPHONE ENCOUNTER
General Call      Reason for Call:  ZEPBOUND    What are your questions or concerns:  Jose, Care coordinator, would like a call back regarding pts Zepbound 7.5.They are unable to locate the prescription anywhere. She did find 12.5 dose and she wants a call back to discuss    [phone], Jose

## 2024-03-27 NOTE — TELEPHONE ENCOUNTER
General Call      Reason for Call:  Zepbound    What are your questions or concerns:  Pt's care coordinator would like a call back asap about zepbound. Jose, [phone]

## 2024-03-27 NOTE — TELEPHONE ENCOUNTER
Care coordinator will call back after she calls to check availability of zepbound 10 mg, patient is currently taking 7.5 mg

## 2024-04-02 ENCOUNTER — TELEPHONE (OUTPATIENT)
Dept: ENDOCRINOLOGY | Facility: CLINIC | Age: 44
End: 2024-04-02
Payer: COMMERCIAL

## 2024-04-02 NOTE — TELEPHONE ENCOUNTER
General Call      Reason for Call:  Crys called (attends his appts) to let Nya Camarena know that the aquatic pool therapy order needs to go to This fax: [phone]    If any questions, she can be reached at: [phone]

## 2024-04-03 ENCOUNTER — OFFICE VISIT (OUTPATIENT)
Dept: ORTHOPEDICS | Facility: CLINIC | Age: 44
End: 2024-04-03
Payer: COMMERCIAL

## 2024-04-03 DIAGNOSIS — M17.0 PRIMARY OSTEOARTHRITIS OF BOTH KNEES: Primary | ICD-10-CM

## 2024-04-03 PROCEDURE — 20611 DRAIN/INJ JOINT/BURSA W/US: CPT | Mod: 50 | Performed by: FAMILY MEDICINE

## 2024-04-03 RX ORDER — HYALURONATE SODIUM 10 MG/ML
20 SYRINGE (ML) INTRAARTICULAR
Status: SHIPPED | OUTPATIENT
Start: 2024-04-03

## 2024-04-03 RX ORDER — LIDOCAINE HYDROCHLORIDE 10 MG/ML
5 INJECTION, SOLUTION INFILTRATION; PERINEURAL
Status: SHIPPED | OUTPATIENT
Start: 2024-04-03

## 2024-04-03 RX ADMIN — Medication 20 MG: at 12:51

## 2024-04-03 RX ADMIN — LIDOCAINE HYDROCHLORIDE 5 ML: 10 INJECTION, SOLUTION INFILTRATION; PERINEURAL at 12:51

## 2024-04-03 NOTE — PROGRESS NOTES
ASSESSMENT & PLAN  Patient Instructions     1. Primary osteoarthritis of both knees      -Patient is following up for chronic bilateral knee pain due to arthritis  -Patient tolerated his first Euflexxa injection in bilateral knees today without complications.  Patient was given postprocedure instructions  Patient will follow-up in 1 week with Dr. Le for his second Euflexxa injection  -Call direct clinic number [[phone]] at any time with questions or concerns.    Albert Yeo MD [facility] Orthoped[facility] and Sports Medicine  [facility]        -----    SUBJECTIVE:  Kimo Greenwood is a 43 year old male who is seen for the 1st US guided bilateral knee intra articular Euflexxa injections.    Large Joint Injection/Arthocentesis: bilateral knee    Date/Time: 4/3/2024 12:51 PM    Performed by: Yeo, Albert, MD  Authorized by: Yeo, Albert, MD    Indications:  Pain and osteoarthritis  Needle Size:  22 G  Guidance: ultrasound    Approach:  Anterolateral  Location:  Knee  Laterality:  Bilateral      Medications (Right):  20 mg sodium hyaluronate 20 MG/2ML; 5 mL lidocaine 1 %  Aspirate amount (mL):  11  Aspirate:  Serous and yellow  Medications (Left):  20 mg sodium hyaluronate 20 MG/2ML; 5 mL lidocaine 1 %  Aspirate amount (mL):  11  Aspirate:  Serous and yellow  Outcome:  Tolerated well, no immediate complications  Procedure discussed: discussed risks, benefits, and alternatives    Consent Given by:  Patient  Timeout: timeout called immediately prior to procedure    Prep: patient was prepped and draped in usual sterile fashion     Ultrasound was used to ensure safe and accurate needle placement and injection. Ultrasound images of the procedure were permanently stored.          Albert Yeo MD, [facility]

## 2024-04-03 NOTE — PATIENT INSTRUCTIONS
1. Primary osteoarthritis of both knees      -Patient is following up for chronic bilateral knee pain due to arthritis  -Patient tolerated his first Euflexxa injection in bilateral knees today without complications.  Patient was given postprocedure instructions  Patient will follow-up in 1 week with Dr. Le for his second Euflexxa injection  -Call direct clinic number [[phone]] at any time with questions or concerns.    Albert Yeo MD [facility]  [facility]

## 2024-04-03 NOTE — LETTER
4/3/2024         RE: Kimo Greenwood  [address]  [place] [address]        Dear Colleague,    Thank you for referring your patient, Kimo Greenwood, to the [facility] [place]. Please see a copy of my visit note below.    ASSESSMENT & PLAN  Patient Instructions     1. Primary osteoarthritis of both knees      -Patient is following up for chronic bilateral knee pain due to arthritis  -Patient tolerated his first Euflexxa injection in bilateral knees today without complications.  Patient was given postprocedure instructions  Patient will follow-up in 1 week with Dr. Le for his second Euflexxa injection  -Call direct clinic number [[phone]] at any time with questions or concerns.    Albert Yeo MD [facility] Orthopedics and Sports Medicine  [facility]        -----    SUBJECTIVE:  Kimo Greenwood is a 43 year old male who is seen for the 1st US guided bilateral knee intra articular Euflexxa injections.    Large Joint Injection/Arthocentesis: bilateral knee    Date/Time: 4/3/2024 12:51 PM    Performed by: Yeo, Albert, MD  Authorized by: Yeo, Albert, MD    Indications:  Pain and osteoarthritis  Needle Size:  22 G  Guidance: ultrasound    Approach:  Anterolateral  Location:  Knee  Laterality:  Bilateral      Medications (Right):  20 mg sodium hyaluronate 20 MG/2ML; 5 mL lidocaine 1 %  Aspirate amount (mL):  11  Aspirate:  Serous and yellow  Medications (Left):  20 mg sodium hyaluronate 20 MG/2ML; 5 mL lidocaine 1 %  Aspirate amount (mL):  11  Aspirate:  Serous and yellow  Outcome:  Tolerated well, no immediate complications  Procedure discussed: discussed risks, benefits, and alternatives    Consent Given by:  Patient  Timeout: timeout called immediately prior to procedure    Prep: patient was prepped and draped in usual sterile fashion     Ultrasound was used to ensure safe and accurate needle placement and injection. Ultrasound  images of the procedure were permanently stored.          Albert Yeo MD, [facility]      Again, thank you for allowing me to participate in the care of your patient.        Sincerely,        Albert Yeo, MD

## 2024-04-04 ENCOUNTER — OFFICE VISIT (OUTPATIENT)
Dept: ENDOCRINOLOGY | Facility: CLINIC | Age: 44
End: 2024-04-04
Payer: COMMERCIAL

## 2024-04-04 VITALS
HEART RATE: 89 BPM | OXYGEN SATURATION: 96 % | HEIGHT: 72 IN | BODY MASS INDEX: 42.66 KG/M2 | SYSTOLIC BLOOD PRESSURE: 126 MMHG | WEIGHT: 315 LBS | DIASTOLIC BLOOD PRESSURE: 85 MMHG

## 2024-04-04 DIAGNOSIS — E66.813 CLASS 3 SEVERE OBESITY DUE TO EXCESS CALORIES WITH SERIOUS COMORBIDITY AND BODY MASS INDEX (BMI) OF 50.0 TO 59.9 IN ADULT (H): ICD-10-CM

## 2024-04-04 DIAGNOSIS — E66.01 CLASS 3 SEVERE OBESITY DUE TO EXCESS CALORIES WITH SERIOUS COMORBIDITY AND BODY MASS INDEX (BMI) OF 50.0 TO 59.9 IN ADULT (H): ICD-10-CM

## 2024-04-04 PROCEDURE — 99213 OFFICE O/P EST LOW 20 MIN: CPT

## 2024-04-04 ASSESSMENT — PAIN SCALES - GENERAL: PAINLEVEL: SEVERE PAIN (7)

## 2024-04-04 NOTE — LETTER
2024       RE: Kimo Greenwood   [address]  [place] [address]     Dear Colleague,    Thank you for referring your patient, Kimo Greenwood, to the [facility] [place] [facility]. Please see a copy of my visit note below.      Return Medical Weight Management Note     Kimo Greenwood  MRN:  [id number]  :  1980  SALEEM:  2024    Dear Ciro Love MD,    I had the pleasure of seeing your patient Kimo Greenwood. He is a 43 year old male who I am continuing to see for treatment of obesity related to:        2023     1:43 PM   --   I have the following health issues associated with obesity None of the above       Assessment & Plan  Problem List Items Addressed This Visit       Class 3 severe obesity with serious comorbidity and body mass index (BMI) of 50.0 to 59.9 in adult (H)    Relevant Medications    tirzepatide-Weight Management (ZEPBOUND) 10 MG/0.5ML prefilled pen      Continue Zepbound 10.0mg once weekly. Refills sent   Fluid goal - 1 water bottle daily   Continue working on nicotine cessation in preparation for hip replacement   Follow up with Nya Camarena on 2024    INTERVAL HISTORY:  First seen for NBS - 2023  Continued with MWM, concerned for post op diet.   Weight loss required for hip replacement <330lbs  Transitioned to Wegovy 1.0mg   3/13/23 - continued Wegovy 1.0mg, goal of decreasing pop to 1-2 bottles daily   Reached out via [employer] 23 - increased Wegovy 1.7mg   23 - continued Wegovy 1.7mg   2023- continued wegovy 1.7, was finding it helpful with hunger and weight loss    Reached out via [employer] and increased Wegovy 2.4mg    Tried to transition to zepbound, but was not on formulary  Transitioned to Zepbound      Will meet with orthopedic team when he is at 340lbs. Has continued to use nicotine products     Anti-obesity medication history    Current:    Zepbound 10.0mg - no side effects. Has taken 2 injections. Has been helpful with continued weight loss.       Recent diet changes: Has been craving red meat and worley recently. Eating 2 meals a day. Has been working on having a snack earlier in the day. Drinking fresca, and has been having less pop.     Recent exercise/activity changes: Just got a knee injection with [employer]. Is hopeful it will be helpful. Has been trying to be as active as possible. Has not yet started pool therapy.     Labs: continues to be anemic, just started an iron supplement. Followed by PCP       CURRENT WEIGHT:   364 lbs 0 oz    Initial Weight (lbs): 407 lbs  Last Visits Weight: 167.8 kg (370 lb)  Cumulative weight loss (lbs): 43  Weight Loss Percentage: 10.57%    Wt Readings from Last 5 Encounters:   04/10/24 (!) 168.9 kg (372 lb 4.8 oz)   04/04/24 (!) 165.1 kg (364 lb)   03/15/24 (!) 166.5 kg (367 lb)   02/27/24 (!) 166.5 kg (367 lb)   02/26/24 (!) 167.8 kg (370 lb)             4/4/2024    11:03 AM   Changes and Difficulties   I have made the following changes to my diet since my last visit: Switched from regular pop to Fresca   With regards to my diet, I am still struggling with: NA   I have made the following changes to my activity/exercise since my last visit: No   With regards to my activity/exercise, I am still struggling with: Pain in knees and hips         MEDICATIONS:   Current Outpatient Medications   Medication Sig Dispense Refill    albuterol (VENTOLIN HFA) 108 (90 Base) MCG/ACT inhaler INHALE 2 PUFFS INTO LUNGS EVERY SIX HOURS AS NEEDED FOR SHORTNESS OF BREATH / DYSPNEA OR WHEEZING 18 g 1    calcium polycarbophil (FIBER-LAX) 625 MG tablet Take 2 tablets (1,250 mg) by mouth 2 times daily 360 tablet 0    celecoxib (CELEBREX) 100 MG capsule Take 1 capsule (100 mg) by mouth 2 times daily 60 capsule 1    DULoxetine (CYMBALTA) 60 MG capsule Take 120 mg by mouth daily       ferrous sulfate (FEROSUL) 325 (65 Fe) MG tablet  Take 1 tablet (325 mg) by mouth daily (with breakfast) 30 tablet 2    hydrochlorothiazide (HYDRODIURIL) 25 MG tablet Take 1 tablet (25 mg) by mouth daily 30 tablet 7    lisinopril (ZESTRIL) 10 MG tablet Take 1 tablet (10 mg) by mouth every morning 30 tablet 7    mirabegron (MYRBETRIQ) 50 MG 24 hr tablet Take 1 tablet by mouth daily      nystatin-triamcinolone (MYCOLOG II) 972544-5.1 UNIT/GM-% external cream Apply topically 2 times daily 60 g 0    OLANZapine-samidorphan (LYBALVI) 10-10 MG TABS tablet Take 1 tablet by mouth At Bedtime      order for DME Equipment being ordered: CPAP supplies 1 each 0    oxyBUTYnin ER (DITROPAN XL) 15 MG 24 hr tablet Take 2 tablets (30 mg) by mouth daily 60 tablet 1    prazosin (MINIPRESS) 1 MG capsule Take 3 mg by mouth At Bedtime      tirzepatide-Weight Management (ZEPBOUND) 10 MG/0.5ML prefilled pen Inject 0.5 mLs (10 mg) Subcutaneous every 7 days 2 mL 3    nicotine (NICORETTE) 2 MG gum Place 1 each (2 mg) inside cheek every hour as needed for nicotine withdrawal symptoms 50 each 1    pantoprazole (PROTONIX) 40 MG EC tablet TAKE 1 TABLET BY MOUTH ONCE DAILY 30-60 MINUTES BR FIRST MEAL OF THE DAY 30 tablet 7    predniSONE (DELTASONE) 20 MG tablet Take 1 tablet (20 mg) by mouth 2 times daily for 5 days 10 tablet 0           4/4/2024    11:03 AM   Weight Loss Medication History Reviewed With Patient   Which weight loss medications are you currently taking on a regular basis? None   Are you having any side effects from the weight loss medication that we have prescribed you? No           Objective   /85 (BP Location: Right arm, Patient Position: Sitting, Cuff Size: Adult Regular)   Pulse 89   Ht 1.829 m (6')   Wt (!) 165.1 kg (364 lb)   SpO2 96%   BMI 49.37 kg/m      PHYSICAL EXAM:  GENERAL: alert and no distress  EYES: Eyes grossly normal to inspection.  No discharge or erythema, or obvious scleral/conjunctival abnormalities.  RESP: No audible wheeze, cough, or visible  cyanosis.    SKIN: Visible skin clear. No significant rash, abnormal pigmentation or lesions.  NEURO: Cranial nerves grossly intact.  Mentation and speech appropriate for age.  PSYCH: Appropriate affect, tone, and pace of words        Sincerely,    Nya Lara PA-C      21 minutes spent by me on the date of the encounter doing chart review, history and exam, documentation and further activities per the note

## 2024-04-04 NOTE — NURSING NOTE
(   Chief Complaint   Patient presents with    RECHECK     Weight management    )    ( Weight: (!) 165.1 kg (364 lb) (shoes on) )  ( Height: 182.9 cm (6') )  ( BMI (Calculated): 49.37 )  (   )  ( Cumulative weight loss (lbs): 0 )  (   )  (   )  (   )  (   )    ( BP: 126/85 )  (   )  (   )  (   )  ( Pulse: 89 )  (   )  ( SpO2: 96 % )    (   Patient Active Problem List   Diagnosis    Speech/language delay    Tobacco use disorder    Nocturnal enuresis    Moderate major depression (H)    LOREN (obstructive sleep apnea)    Essential hypertension    Class 3 severe obesity with serious comorbidity and body mass index (BMI) of 50.0 to 59.9 in adult (H)    Leukocytosis    Suicidal ideation    Chronic low back pain    Bilateral low back pain with left-sided sciatica    History of colonic polyps    Mild intellectual disability    S/P total hip arthroplasty    Vitamin D deficiency    LPRD (laryngopharyngeal reflux disease)    Patel's esophagus determined by biopsy    Mild intermittent asthma    Somatic dysfunction of lumbar region    Somatic dysfunction of sacral region    Sacral pain    Thoracic segment dysfunction    Hip pain, left    Duodenitis    Overactive bladder    Venous stasis dermatitis of both lower extremities    Severe recurrent major depressive disorder with psychotic features (H)    Alcohol use disorder, mild, abuse    Cannabis use disorder, mild, abuse    Mood change    )  (   Current Outpatient Medications   Medication Sig Dispense Refill    albuterol (VENTOLIN HFA) 108 (90 Base) MCG/ACT inhaler INHALE 2 PUFFS INTO LUNGS EVERY SIX HOURS AS NEEDED FOR SHORTNESS OF BREATH / DYSPNEA OR WHEEZING 18 g 1    calcium polycarbophil (FIBER-LAX) 625 MG tablet Take 2 tablets (1,250 mg) by mouth 2 times daily 360 tablet 0    celecoxib (CELEBREX) 100 MG capsule Take 1 capsule (100 mg) by mouth 2 times daily 60 capsule 1    DULoxetine (CYMBALTA) 60 MG capsule Take 120 mg by mouth daily       ferrous sulfate (FEROSUL) 325 (65  Fe) MG tablet Take 1 tablet (325 mg) by mouth daily (with breakfast) 30 tablet 2    hydrochlorothiazide (HYDRODIURIL) 25 MG tablet Take 1 tablet (25 mg) by mouth daily 30 tablet 7    lisinopril (ZESTRIL) 10 MG tablet Take 1 tablet (10 mg) by mouth every morning 30 tablet 7    mirabegron (MYRBETRIQ) 50 MG 24 hr tablet Take 1 tablet by mouth daily      nicotine (NICORETTE) 2 MG gum Place 1 each (2 mg) inside cheek every hour as needed for nicotine withdrawal symptoms 50 each 1    nicotine (NICOTROL) 10 MG inhaler Use 1 cartridge as needed for urge to smoke by puffing over course of 20min.  Use 6-16 cart/day; reduce number of cart/day over 6-12 weeks. 6 each 3    nicotine (NICOTROL) 10 MG inhaler Use 1 cartridge as needed for urge to smoke by puffing over course of 20min.  Use 6-16 cart/day; reduce number of cart/day over 6-12 weeks. 158 each 1    nystatin-triamcinolone (MYCOLOG II) 594204-3.1 UNIT/GM-% external cream Apply topically 2 times daily 60 g 0    OLANZapine-samidorphan (LYBALVI) 10-10 MG TABS tablet Take 1 tablet by mouth At Bedtime      order for DME Equipment being ordered: CPAP supplies 1 each 0    oxyBUTYnin ER (DITROPAN XL) 15 MG 24 hr tablet Take 2 tablets (30 mg) by mouth daily 60 tablet 1    prazosin (MINIPRESS) 1 MG capsule Take 3 mg by mouth At Bedtime      tirzepatide-Weight Management (ZEPBOUND) 10 MG/0.5ML prefilled pen Inject 0.5 mLs (10 mg) Subcutaneous every 7 days 2 mL 0    tirzepatide-Weight Management (ZEPBOUND) 7.5 MG/0.5ML prefilled pen Inject 0.5 mLs (7.5 mg) Subcutaneous every 7 days 2 mL 3    pantoprazole (PROTONIX) 40 MG EC tablet TAKE 1 TABLET BY MOUTH ONCE DAILY 30-60 MINUTES BR FIRST MEAL OF THE DAY 30 tablet 7    Semaglutide-Weight Management (WEGOVY) 2.4 MG/0.75ML pen Inject 2.4 mg Subcutaneous once a week (Patient not taking: Reported on 4/4/2024) 3 mL 3    )  ( Diabetes Eval:    )    ( Pain Eval:  Severe Pain (7) )    ( Wound Eval:       )    (   History   Smoking Status     Every Day    Packs/day: 1.00    Years: 1.00    Types: Vaping Device, Cigarettes   Smokeless Tobacco    Current    Types: Chew    )    ( Signed By:  Jessica Haas EMT; April 4, 2024; 11:12 AM )

## 2024-04-04 NOTE — PROGRESS NOTES
Return Medical Weight Management Note     Kimo Greenwood  MRN:  [id number]  :  1980  SALEEM:  2024    Dear Ciro Love MD,    I had the pleasure of seeing your patient Kimo Greenwood. He is a 43 year old male who I am continuing to see for treatment of obesity related to:        2023     1:43 PM   --   I have the following health issues associated with obesity None of the above       Assessment & Plan   Problem List Items Addressed This Visit       Class 3 severe obesity with serious comorbidity and body mass index (BMI) of 50.0 to 59.9 in adult (H)    Relevant Medications    tirzepatide-Weight Management (ZEPBOUND) 10 MG/0.5ML prefilled pen      Continue Zepbound 10.0mg once weekly. Refills sent   Fluid goal - 1 water bottle daily   Continue working on nicotine cessation in preparation for hip replacement   Follow up with yNa Camarena on 2024    INTERVAL HISTORY:  First seen for NBS - 2023  Continued with MWM, concerned for post op diet.   Weight loss required for hip replacement <330lbs  Transitioned to Wegovy 1.0mg   3/13/23 - continued Wegovy 1.0mg, goal of decreasing pop to 1-2 bottles daily   Reached out via [employer] 23 - increased Wegovy 1.7mg   23 - continued Wegovy 1.7mg   2023- continued wegovy 1.7, was finding it helpful with hunger and weight loss    Reached out via [employer]" and increased Wegovy 2.4mg    Tried to transition to zepbound, but was not on formulary  Transitioned to Zepbound      Will meet with orthopedic team when he is at 340lbs. Has continued to use nicotine products     Anti-obesity medication history    Current:   Zepbound 10.0mg - no side effects. Has taken 2 injections. Has been helpful with continued weight loss.       Recent diet changes: Has been craving red meat and worley recently. Eating 2 meals a day. Has been working on having a snack earlier in the day. Drinking fresca, and has been having less pop.     Recent exercise/activity changes:  Just got a knee injection with [employer]. Is hopeful it will be helpful. Has been trying to be as active as possible. Has not yet started pool therapy.     Labs: continues to be anemic, just started an iron supplement. Followed by PCP       CURRENT WEIGHT:   364 lbs 0 oz    Initial Weight (lbs): 407 lbs  Last Visits Weight: 167.8 kg (370 lb)  Cumulative weight loss (lbs): 43  Weight Loss Percentage: 10.57%    Wt Readings from Last 5 Encounters:   04/10/24 (!) 168.9 kg (372 lb 4.8 oz)   04/04/24 (!) 165.1 kg (364 lb)   03/15/24 (!) 166.5 kg (367 lb)   02/27/24 (!) 166.5 kg (367 lb)   02/26/24 (!) 167.8 kg (370 lb)             4/4/2024    11:03 AM   Changes and Difficulties   I have made the following changes to my diet since my last visit: Switched from regular pop to Fresca   With regards to my diet, I am still struggling with: NA   I have made the following changes to my activity/exercise since my last visit: No   With regards to my activity/exercise, I am still struggling with: Pain in knees and hips         MEDICATIONS:   Current Outpatient Medications   Medication Sig Dispense Refill    albuterol (VENTOLIN HFA) 108 (90 Base) MCG/ACT inhaler INHALE 2 PUFFS INTO LUNGS EVERY SIX HOURS AS NEEDED FOR SHORTNESS OF BREATH / DYSPNEA OR WHEEZING 18 g 1    calcium polycarbophil (FIBER-LAX) 625 MG tablet Take 2 tablets (1,250 mg) by mouth 2 times daily 360 tablet 0    celecoxib (CELEBREX) 100 MG capsule Take 1 capsule (100 mg) by mouth 2 times daily 60 capsule 1    DULoxetine (CYMBALTA) 60 MG capsule Take 120 mg by mouth daily       ferrous sulfate (FEROSUL) 325 (65 Fe) MG tablet Take 1 tablet (325 mg) by mouth daily (with breakfast) 30 tablet 2    hydrochlorothiazide (HYDRODIURIL) 25 MG tablet Take 1 tablet (25 mg) by mouth daily 30 tablet 7    lisinopril (ZESTRIL) 10 MG tablet Take 1 tablet (10 mg) by mouth every morning 30 tablet 7    mirabegron (MYRBETRIQ) 50 MG 24 hr tablet Take 1 tablet by mouth daily       nystatin-triamcinolone (MYCOLOG II) 769270-9.1 UNIT/GM-% external cream Apply topically 2 times daily 60 g 0    OLANZapine-samidorphan (LYBALVI) 10-10 MG TABS tablet Take 1 tablet by mouth At Bedtime      order for DME Equipment being ordered: CPAP supplies 1 each 0    oxyBUTYnin ER (DITROPAN XL) 15 MG 24 hr tablet Take 2 tablets (30 mg) by mouth daily 60 tablet 1    prazosin (MINIPRESS) 1 MG capsule Take 3 mg by mouth At Bedtime      tirzepatide-Weight Management (ZEPBOUND) 10 MG/0.5ML prefilled pen Inject 0.5 mLs (10 mg) Subcutaneous every 7 days 2 mL 3    nicotine (NICORETTE) 2 MG gum Place 1 each (2 mg) inside cheek every hour as needed for nicotine withdrawal symptoms 50 each 1    pantoprazole (PROTONIX) 40 MG EC tablet TAKE 1 TABLET BY MOUTH ONCE DAILY 30-60 MINUTES BR FIRST MEAL OF THE DAY 30 tablet 7    predniSONE (DELTASONE) 20 MG tablet Take 1 tablet (20 mg) by mouth 2 times daily for 5 days 10 tablet 0           4/4/2024    11:03 AM   Weight Loss Medication History Reviewed With Patient   Which weight loss medications are you currently taking on a regular basis? None   Are you having any side effects from the weight loss medication that we have prescribed you? No           Objective    /85 (BP Location: Right arm, Patient Position: Sitting, Cuff Size: Adult Regular)   Pulse 89   Ht 1.829 m (6')   Wt (!) 165.1 kg (364 lb)   SpO2 96%   BMI 49.37 kg/m      PHYSICAL EXAM:  GENERAL: alert and no distress  EYES: Eyes grossly normal to inspection.  No discharge or erythema, or obvious scleral/conjunctival abnormalities.  RESP: No audible wheeze, cough, or visible cyanosis.    SKIN: Visible skin clear. No significant rash, abnormal pigmentation or lesions.  NEURO: Cranial nerves grossly intact.  Mentation and speech appropriate for age.  PSYCH: Appropriate affect, tone, and pace of words        Sincerely,    Nya Lara PA-C      21 minutes spent by me on the date of the encounter doing chart  review, history and exam, documentation and further activities per the note

## 2024-04-09 ENCOUNTER — E-VISIT (OUTPATIENT)
Dept: FAMILY MEDICINE | Facility: CLINIC | Age: 44
End: 2024-04-09
Payer: COMMERCIAL

## 2024-04-09 DIAGNOSIS — L98.9 SKIN LESION: Primary | ICD-10-CM

## 2024-04-09 PROCEDURE — 99207 PR NON-BILLABLE SERV PER CHARTING: CPT | Performed by: FAMILY MEDICINE

## 2024-04-09 ASSESSMENT — SLEEP AND FATIGUE QUESTIONNAIRES
HOW LIKELY ARE YOU TO NOD OFF OR FALL ASLEEP WHILE SITTING INACTIVE IN A PUBLIC PLACE: MODERATE CHANCE OF DOZING
HOW LIKELY ARE YOU TO NOD OFF OR FALL ASLEEP WHILE SITTING AND READING: HIGH CHANCE OF DOZING
HOW LIKELY ARE YOU TO NOD OFF OR FALL ASLEEP IN A CAR, WHILE STOPPED FOR A FEW MINUTES IN TRAFFIC: SLIGHT CHANCE OF DOZING
HOW LIKELY ARE YOU TO NOD OFF OR FALL ASLEEP WHILE LYING DOWN TO REST IN THE AFTERNOON WHEN CIRCUMSTANCES PERMIT: HIGH CHANCE OF DOZING
HOW LIKELY ARE YOU TO NOD OFF OR FALL ASLEEP WHEN YOU ARE A PASSENGER IN A CAR FOR AN HOUR WITHOUT A BREAK: SLIGHT CHANCE OF DOZING
HOW LIKELY ARE YOU TO NOD OFF OR FALL ASLEEP WHILE SITTING AND TALKING TO SOMEONE: SLIGHT CHANCE OF DOZING
HOW LIKELY ARE YOU TO NOD OFF OR FALL ASLEEP WHILE WATCHING TV: HIGH CHANCE OF DOZING
HOW LIKELY ARE YOU TO NOD OFF OR FALL ASLEEP WHILE SITTING QUIETLY AFTER LUNCH WITHOUT ALCOHOL: MODERATE CHANCE OF DOZING

## 2024-04-10 ENCOUNTER — OFFICE VISIT (OUTPATIENT)
Dept: ORTHOPEDICS | Facility: CLINIC | Age: 44
End: 2024-04-10
Payer: COMMERCIAL

## 2024-04-10 ENCOUNTER — OFFICE VISIT (OUTPATIENT)
Dept: URGENT CARE | Facility: URGENT CARE | Age: 44
End: 2024-04-10
Payer: COMMERCIAL

## 2024-04-10 ENCOUNTER — DOCUMENTATION ONLY (OUTPATIENT)
Dept: SLEEP MEDICINE | Facility: CLINIC | Age: 44
End: 2024-04-10

## 2024-04-10 ENCOUNTER — OFFICE VISIT (OUTPATIENT)
Dept: SLEEP MEDICINE | Facility: CLINIC | Age: 44
End: 2024-04-10
Payer: COMMERCIAL

## 2024-04-10 ENCOUNTER — E-VISIT (OUTPATIENT)
Dept: FAMILY MEDICINE | Facility: CLINIC | Age: 44
End: 2024-04-10
Payer: COMMERCIAL

## 2024-04-10 VITALS
BODY MASS INDEX: 50.49 KG/M2 | HEART RATE: 81 BPM | SYSTOLIC BLOOD PRESSURE: 119 MMHG | DIASTOLIC BLOOD PRESSURE: 72 MMHG | WEIGHT: 315 LBS | OXYGEN SATURATION: 97 %

## 2024-04-10 VITALS
RESPIRATION RATE: 16 BRPM | OXYGEN SATURATION: 99 % | DIASTOLIC BLOOD PRESSURE: 73 MMHG | TEMPERATURE: 97.3 F | SYSTOLIC BLOOD PRESSURE: 120 MMHG | HEART RATE: 80 BPM

## 2024-04-10 DIAGNOSIS — M79.676 PAIN OF TOE, UNSPECIFIED LATERALITY: Primary | ICD-10-CM

## 2024-04-10 DIAGNOSIS — M10.9 ACUTE GOUT INVOLVING TOE OF LEFT FOOT, UNSPECIFIED CAUSE: Primary | ICD-10-CM

## 2024-04-10 DIAGNOSIS — M17.0 PRIMARY OSTEOARTHRITIS OF BOTH KNEES: Primary | ICD-10-CM

## 2024-04-10 DIAGNOSIS — G47.33 OSA (OBSTRUCTIVE SLEEP APNEA): Primary | ICD-10-CM

## 2024-04-10 DIAGNOSIS — G47.23 IRREGULAR SLEEP-WAKE RHYTHM: ICD-10-CM

## 2024-04-10 PROCEDURE — 36415 COLL VENOUS BLD VENIPUNCTURE: CPT | Performed by: NURSE PRACTITIONER

## 2024-04-10 PROCEDURE — 84550 ASSAY OF BLOOD/URIC ACID: CPT | Performed by: NURSE PRACTITIONER

## 2024-04-10 PROCEDURE — 20611 DRAIN/INJ JOINT/BURSA W/US: CPT | Mod: 50 | Performed by: STUDENT IN AN ORGANIZED HEALTH CARE EDUCATION/TRAINING PROGRAM

## 2024-04-10 PROCEDURE — 99213 OFFICE O/P EST LOW 20 MIN: CPT | Performed by: NURSE PRACTITIONER

## 2024-04-10 PROCEDURE — 99214 OFFICE O/P EST MOD 30 MIN: CPT | Performed by: PHYSICIAN ASSISTANT

## 2024-04-10 PROCEDURE — 99207 PR DROP WITH A PROCEDURE: CPT | Performed by: STUDENT IN AN ORGANIZED HEALTH CARE EDUCATION/TRAINING PROGRAM

## 2024-04-10 PROCEDURE — 99207 PR NON-BILLABLE SERV PER CHARTING: CPT | Performed by: FAMILY MEDICINE

## 2024-04-10 RX ORDER — PREDNISONE 20 MG/1
20 TABLET ORAL 2 TIMES DAILY
Qty: 10 TABLET | Refills: 0 | Status: SHIPPED | OUTPATIENT
Start: 2024-04-10 | End: 2024-04-15

## 2024-04-10 RX ORDER — LIDOCAINE HYDROCHLORIDE 10 MG/ML
3 INJECTION, SOLUTION INFILTRATION; PERINEURAL
Status: SHIPPED | OUTPATIENT
Start: 2024-04-10

## 2024-04-10 RX ORDER — HYALURONATE SODIUM 10 MG/ML
20 SYRINGE (ML) INTRAARTICULAR
Status: SHIPPED | OUTPATIENT
Start: 2024-04-10

## 2024-04-10 RX ADMIN — LIDOCAINE HYDROCHLORIDE 3 ML: 10 INJECTION, SOLUTION INFILTRATION; PERINEURAL at 11:35

## 2024-04-10 RX ADMIN — Medication 20 MG: at 11:35

## 2024-04-10 NOTE — PROGRESS NOTES
Sports Medicine Procedure Note    Willard was seen today for procedure and procedure.    Diagnoses and all orders for this visit:    Primary osteoarthritis of both knees  -     Large Joint Injection/Arthocentesis: bilateral knee        Kimo Greenwood is a/an 43 year old male who is seen for Euflexxa injection of bilateral knees Euflexxa injection 2 of 3..   Last injection: 4/3/2024    -Minimal fluid to aspirate today will monitor at next visit in 1 week and consider aspiration at that time.  - Will follow-up in 1 week for injection 3 of 3  - Has active HCA including mom and sister.  Spoke to legal legal guardian Sister Traci Greenwood before performing injection and received verbal consent and confirmation to proceed with injection.discuss risks over the phone.  Was understanding of procedure. She also gave consent for previous injection that will be completed on 4/17/2024 but may have to reach out again before next procedure to confirm.   -Post-injection instructions were provided to the patient  -Return sooner if develops new or worsening symptoms.    Options for treatment and/or follow-up care were reviewed with the patient was actively involved in the decision making process. Patient verbalized understanding and was in agreement with the plan.    Agreeable to HA injection injection. Discussed risks and benefits. they are aware of risks such as skin hypopigmentation, bleeding, and infection.       F/up in one week for third injection      [facility] [place]        Post-Hyaluronic Acid Injection Discharge Instructions    Euflexxa injection was performed today in clinic  - Ok to shower today, but please do not submerge for 48 hours (including bath tub, hot tub or swimming)  - The lidocaine (local numbing medicine) will wear off in several hours. The  injection usually takes several weeks to begin working, so you may not notice a difference in pain for 3-6 weeks.  -  Euflexxa has a small risk of increasing pain/swelling. If this occurs, it generally happens within 24 hours but will usually resolve on its own. You may use ice packs for 15-20 minutes, 3 to 4 times a day at the injection site for comfort if needed, and I recommend limiting your activity. If pain/swelling continues, then please the office.  -Can repeat Euflexxa injection at 6 months with insurance authorization    If you experience any of the following, call Sports Medicine @ [phone] or [phone]  -Fever over 100 degrees F  -Swelling, bleeding, redness, drainage, warmth at the injection site  -New or significant worsening pain     Dr. Marline Le DO  [facility]  Sports Medicine      Large Joint Injection/Arthocentesis: bilateral knee    Date/Time: 4/10/2024 11:35 AM    Performed by: Marline Le DO  Authorized by: Marline Le DO    Indications:  Pain and osteoarthritis  Needle Size:  22 G  Guidance: ultrasound    Approach:  Anterolateral  Location:  Knee  Laterality:  Bilateral      Medications (Right):  20 mg sodium hyaluronate 20 MG/2ML; 3 mL lidocaine 1 %  Medications (Left):  20 mg sodium hyaluronate 20 MG/2ML; 3 mL lidocaine 1 %  Outcome:  Tolerated well, no immediate complications  Procedure discussed: discussed risks, benefits, and alternatives    Consent Given by:  Patient  Timeout: timeout called immediately prior to procedure    Prep: patient was prepped and draped in usual sterile fashion     Ultrasound was used to ensure safe and accurate needle placement and injection. Ultrasound images of the procedure were permanently stored.

## 2024-04-10 NOTE — PATIENT INSTRUCTIONS
Will follow-up next week to do third injection in series    Post-Hyaluronic Acid Injection Discharge Instructions    Synvisc One injection was performed today in clinic  - Ok to shower today, but please do not submerge for 48 hours (including bath tub, hot tub or swimming)  - The lidocaine (local numbing medicine) will wear off in several hours. The Synvisc One injection usually takes several weeks to begin working, so you may not notice a difference in pain for 3-6 weeks.  - Synvisc has a small risk of increasing pain/swelling. If this occurs, it generally happens within 24 hours but will usually resolve on its own. You may use ice packs for 15-20 minutes, 3 to 4 times a day at the injection site for comfort if needed, and I recommend limiting your activity. If pain/swelling continues, then please the office.  -Can repeat Synvisc One injection at 6 months with insurance authorization    If you experience any of the following, call Sports Medicine @ [phone] or [phone]  -Fever over 100 degrees F  -Swelling, bleeding, redness, drainage, warmth at the injection site  -New or significant worsening pain

## 2024-04-10 NOTE — PROGRESS NOTES
[facility]   Outpatient Sleep Medicine  Apr 10, 2024       Name: Kimo Greenwood MRN# [id number]   Age: 43 year old YOB: 1980            Assessment and Plan:   1. LOREN (obstructive sleep apnea)  Pressure change ordered at last visit was never completed, settings are still at 7-15 cm H2O.  His AHI is improved but still mildly elevated at 9 events an hour today.  Mask leak is no longer a problem.  He is still experiencing some air hunger and we agreed to increase settings as previously ordered to 9-15 cm H2O.  We also discussed the option of adding a chinstrap to his current mask given that he is unknowingly taking off in the middle the night, adding chinstrap will make it harder for him to do this. He would like to transition care from [facility] to [facility]. Prescription sent to [facility]. Pressures adjusted manually in clinic today to ensure adjusted.  - Comprehensive DME    2. Irregular sleep-wake rhythm  Patient has very irregular and inconsistent sleep schedule currently and admits to frequent napping during the daytime.  We discussed the importance of establishing a regular bedtime and wake time.  More of a night owl so we agreed to bedtime of 12-1:00 AM and wake time between 9-10:00 AM. May need to restrict at next visit but wanting to start with establishing a rough schedule.  He will use his wake up time as an anchor and ensure he wakes up by 10:00 AM at the latest every single day.  If napping is inevitable he will use his CPAP with naps and will not take a nap any later than 4:00 PM.    Kimo Greenwood will follow up in 3-4 months to recheck progress with above.          Chief Complaint      Chief Complaint   Patient presents with    Sleep Problem     6 MO CPAP Follow up          History of Present Illness:     Kimo Greenwood is a 43 year old male with morbid obesity, chronic low back pain, asthma, Patel's esophagus, hypertension, depression, RBBB,  "intellectual delay, history of moderate to severe LOREN treated with CPAP therapy who presents to clinic today with his PCA for CPAP follow-up visit.     Patient was initially diagnosed with LOREN via split night PSG completed 4/15/2013 at [facility] in [place] (344#, BMI 49.2) showing AHI 23.2, RDI 41.1, oxygen gillian 85%.  PLM index 1.5 with PLM arousal 0.4.  CPAP was titrated from 5 to 9 cm H2O with only lateral sleep observed. Patient was started on autoCPAP [id number] and appears he has been on the setting ever since. [facility].     Last seen 6/2/2023 to establish care.  At that time patient was compliant on CPAP therapy but on download large leak was problematic as well as elevated AHI of 15.4 (obstructive apnea index 5.0, central apnea index 1.5, hypopnea index 8.9).  Given some air hunger noted by patient increase settings to 9-15 cm H2O and he was to follow-up with DME about mask fit.    Returns today for CPAP follow-up visit. Getting new mask resolved leak concerns.  Happy with his fullface mask, comfortable to wear, but has been taking off unknowingly in sleep for unknown reason.  Pressure settings are more comfortable than they were last visit but still \"a little low\".  Denies significant oral/nasal dryness.  No snoring or gasp arousals with mask on.    Irregular sleep schedule. Bedtime varies between 11PM-4AM and wake time 6AM-1PM.     Respironics Auto-PAP 7-15 cmH2O download (3/11/24-4/9/24):  27 total days of use. 3 nonuse days.  Average use 4 hours 29 minutes per day. 43.3% days with >4 hours use.  Large leak 0 sec per day average. CPAP 90% pressure 15.0cm. AHI 9.1 (OA 6.0, CA 1.4, AHI 1.7)    SCALES:   INSOMNIA: Insomnia Severity Score: 16   SLEEPINESS: [place] Sleepiness Score: 16    Past medical/surgical history, family history, social history, medications and allergies were reviewed.           Physical Examination:   /72   Pulse 81   Wt (!) 168.9 kg (372 lb 4.8 " oz)   SpO2 97%   BMI 50.49 kg/m    General appearance: Awake, alert, cooperative. Well groomed. Sitting comfortably in chair. In no apparent distress.  HEENT: Head: Normocephalic, atraumatic. Eyes:Conjunctiva clear. Sclera normal. Nose: External appearance without deformity.   Pulmonary:  Able to speak easily in full sentences. No cough or wheeze.   Skin:  No rashes or significant lesions on visible skin.   Neurologic: Alert, oriented x3.   Psychiatric: Mood euthymic. Affect congruent with full range and intensity.      CC:  Ciro Love PA-C  Apr 10, 2024     [facility]  [address] [place] [address]     [facility] [place]  [address] [address]    Chart documentation was completed, in part, with i[employer] voice-recognition software. Even though reviewed, some grammatical, spelling, and word errors may remain.    38 minutes spent on day of encounter doing chart review, history and exam, documentation, and further activities as noted above

## 2024-04-10 NOTE — PROGRESS NOTES
Assessment & Plan      Diagnosis Comments   1. Acute gout involving toe of left foot, unspecified cause  predniSONE (DELTASONE) 20 MG tablet, Uric acid Home care reviewed. Patient verbalized understanding; will monitor symptoms closely. Reviewed s/e to medications.   Follow up with primary care in 1 week   Handout given from epic and reviewed.        Patient states he does eat a lot of red meat and drinks beer often.  Discussed handouts including low purine diet, home care related to gout symptoms.  Will check uric acid level as well patient will follow-up with his primary care in 1 week.    LAMBERT [facility] KRISTEN Lamar is a 43 year old male who presents to clinic today for the following health issues:  Chief Complaint   Patient presents with    Urgent Care     - Red Toes on left foot  - Toes are swollen  - Patient states that toes are painful but denies injury     HPI    Patient presents to clinic states symptoms started approximately 3 days ago with right foot great toe and second toe swelling tenderness to touch warmth erythema.  Patient states he does take Tylenol and ibuprofen for symptoms and has been elevating and resting.  Denies history of injury.      Review of Systems  Constitutional, HEENT, cardiovascular, pulmonary, gi and gu systems are negative, except as otherwise noted.      Objective    /73   Pulse 80   Temp 97.3  F (36.3  C) (Tympanic)   SpO2 99%   Physical Exam   GENERAL: alert and no distress  NECK: no adenopathy, no asymmetry, masses, or scars  RESP: lungs clear to auscultation - no rales, rhonchi or wheezes  CV: regular rate and rhythm, normal S1 S2, no S3 or S4, no murmur, click or rub, no peripheral edema  ABDOMEN: soft, nontender, no hepatosplenomegaly, no masses and bowel sounds normal  MS: Right foot great and second toe swollen, erythema, warmth, CMS intact normal pulses.  SKIN: no suspicious lesions or rashes

## 2024-04-10 NOTE — NURSING NOTE
Chief Complaint   Patient presents with    Sleep Problem     6 MO CPAP Follow up       Initial /72   Pulse 81   Wt (!) 168.9 kg (372 lb 4.8 oz)   SpO2 97%   BMI 50.49 kg/m   Estimated body mass index is 50.49 kg/m  as calculated from the following:    Height as of 4/4/24: 1.829 m (6').    Weight as of this encounter: 168.9 kg (372 lb 4.8 oz).    Medication Reconciliation: complete    ESS 16    LORENZA 16    DME: TONI Santamaria CMA ([facility])

## 2024-04-10 NOTE — LETTER
4/10/2024         RE: Kimo Greenwood  [address]  [place] [address]        Dear Colleague,    Thank you for referring your patient, Kimo Greenwood, to the [facility]IC[facility]. Please see a copy of my visit note below.    Sports Medicine Procedure Note    Willard was seen today for procedure and procedure.    Diagnoses and all orders for this visit:    Primary osteoarthritis of both knees  -     Large Joint Injection/Arthocentesis: bilateral knee        Kimo Greenwood is a/an 43 year old male who is seen for Euflexxa injection of bilateral knees Euflexxa injection 2 of 3..   Last injection: 4/3/2024    -Minimal fluid to aspirate today will monitor at next visit in 1 week and consider aspiration at that time.  - Will follow-up in 1 week for injection 3 of 3  - Has active HCA including mom and sister.  Spoke to legal legal guardian Sister Traci Greenwood before performing injection and received verbal consent and confirmation to proceed with injection.discuss risks over the phone.  Was understanding of procedure. She also gave consent for previous injection that will be completed on 4/17/2024 but may have to reach out again before next procedure to confirm.   -Post-injection instructions were provided to the patient  -Return sooner if develops new or worsening symptoms.    Options for treatment and/or follow-up care were reviewed with the patient was actively involved in the decision making process. Patient verbalized understanding and was in agreement with the plan.    Agreeable to HA injection injection. Discussed risks and benefits. they are aware of risks such as skin hypopigmentation, bleeding, and infection.       F/up in one week for third injection      Marline Le DO  [facility] SP[facility]TS MEDIC[facility]        Post-Hyaluronic Acid Injection Discharge Instructions    Euflexxa injection was performed today in clinic  - Ok to shower  today, but please do not submerge for 48 hours (including bath tub, hot tub or swimming)  - The lidocaine (local numbing medicine) will wear off in several hours. The  injection usually takes several weeks to begin working, so you may not notice a difference in pain for 3-6 weeks.  - Euflexxa has a small risk of increasing pain/swelling. If this occurs, it generally happens within 24 hours but will usually resolve on its own. You may use ice packs for 15-20 minutes, 3 to 4 times a day at the injection site for comfort if needed, and I recommend limiting your activity. If pain/swelling continues, then please the office.  -Can repeat Euflexxa injection at 6 months with insurance authorization    If you experience any of the following, call Sports Medicine @ [phone] or [phone]  -Fever over 100 degrees F  -Swelling, bleeding, redness, drainage, warmth at the injection site  -New or significant worsening pain     Dr. Marline Le DO  [facility]  Sports Medicine      Large Joint Injection/Arthocentesis: bilateral knee    Date/Time: 4/10/2024 11:35 AM    Performed by: Marline Le DO  Authorized by: Marline Le DO    Indications:  Pain and osteoarthritis  Needle Size:  22 G  Guidance: ultrasound    Approach:  Anterolateral  Location:  Knee  Laterality:  Bilateral      Medications (Right):  20 mg sodium hyaluronate 20 MG/2ML; 3 mL lidocaine 1 %  Medications (Left):  20 mg sodium hyaluronate 20 MG/2ML; 3 mL lidocaine 1 %  Outcome:  Tolerated well, no immediate complications  Procedure discussed: discussed risks, benefits, and alternatives    Consent Given by:  Patient  Timeout: timeout called immediately prior to procedure    Prep: patient was prepped and draped in usual sterile fashion     Ultrasound was used to ensure safe and accurate needle placement and injection. Ultrasound images of the procedure were permanently stored.          Again, thank you for allowing me to  participate in the care of your patient.        Sincerely,        Marline Le, DO

## 2024-04-11 ENCOUNTER — APPOINTMENT (OUTPATIENT)
Dept: URBAN - METROPOLITAN AREA CLINIC 253 | Age: 44
Setting detail: DERMATOLOGY
End: 2024-04-11

## 2024-04-11 ENCOUNTER — APPOINTMENT (OUTPATIENT)
Dept: URBAN - METROPOLITAN AREA CLINIC 253 | Age: 44
Setting detail: DERMATOLOGY
End: 2024-04-19

## 2024-04-11 VITALS — RESPIRATION RATE: 14 BRPM | WEIGHT: 315 LBS | HEIGHT: 75 IN

## 2024-04-11 DIAGNOSIS — B35.3 TINEA PEDIS: ICD-10-CM

## 2024-04-11 LAB — URATE SERPL-MCNC: 10.1 MG/DL (ref 3.4–7)

## 2024-04-11 PROCEDURE — OTHER PRESCRIPTION: OTHER

## 2024-04-11 PROCEDURE — OTHER PHOTO-DOCUMENTATION: OTHER

## 2024-04-11 PROCEDURE — OTHER MIPS QUALITY: OTHER

## 2024-04-11 PROCEDURE — 99213 OFFICE O/P EST LOW 20 MIN: CPT

## 2024-04-11 PROCEDURE — OTHER COUNSELING: OTHER

## 2024-04-11 PROCEDURE — OTHER ADDITIONAL NOTES: OTHER

## 2024-04-11 RX ORDER — ECONAZOLE NITRATE 10 MG/G
1% CREAM TOPICAL BID
Qty: 60 | Refills: 1 | Status: ERX | COMMUNITY
Start: 2024-04-11

## 2024-04-11 RX ORDER — FLUCONAZOLE 150 MG/1
150MG TABLET ORAL
Qty: 4 | Refills: 0 | Status: ERX | COMMUNITY
Start: 2024-04-11

## 2024-04-11 ASSESSMENT — LOCATION DETAILED DESCRIPTION DERM
LOCATION DETAILED: LEFT DORSAL FOOT
LOCATION DETAILED: RIGHT DORSAL FOOT

## 2024-04-11 ASSESSMENT — LOCATION SIMPLE DESCRIPTION DERM
LOCATION SIMPLE: RIGHT FOOT
LOCATION SIMPLE: LEFT FOOT

## 2024-04-11 ASSESSMENT — LOCATION ZONE DERM: LOCATION ZONE: FEET

## 2024-04-11 NOTE — PROCEDURE: ADDITIONAL NOTES
Additional Notes: Recommended patient use hydrocortisone as needed for itching. \\nRecommended daily vinegar soaks with 1 part vinegar to 3 parts water
Render Risk Assessment In Note?: no
Detail Level: Simple

## 2024-04-11 NOTE — HPI: RASH
Is This A New Presentation, Or A Follow-Up?: Rash
Additional History: They have tried hydrocortisone and a nystatin-triamcinolone cream which have not been very helpful. He was using these just once daily as needed. \\n\\nHe has gout on two toes of the left foot.

## 2024-04-11 NOTE — HPI: RASH
What Type Of Note Output Would You Prefer (Optional)?: Standard Output
Is This A New Presentation, Or A Follow-Up?: Rash
Additional History: He has tried hydrocortisone and triamcinolone-nystatin cream but these did not really help. He used these once daily only as needed. \\nHe has gout on 2 toes.

## 2024-04-12 ENCOUNTER — TRANSFERRED RECORDS (OUTPATIENT)
Dept: HEALTH INFORMATION MANAGEMENT | Facility: CLINIC | Age: 44
End: 2024-04-12
Payer: COMMERCIAL

## 2024-04-12 NOTE — PATIENT INSTRUCTIONS
"Robert Lamar,   Thank you for allowing us the privilege of caring for you. We hope we provided you with the excellent service you deserve.   Please let us know if there is anything else we can do for you so that we can be sure you are completely satisfied with your care experience.    To ensure the quality of our services you may be receiving a patient satisfaction survey from an independent patient satisfaction monitoring company.    The greatest compliment you can give is a \"Likely to Recommend\"    Your visit was with Nya Lara PA-C today.    Instructions per today's visit:     Continue Zepbound 10.0mg once weekly. Refills sent   Fluid goal - 1 water bottle daily   Continue working on nicotine cessation in preparation for hip replacement   Follow up with Shasha on 7/16/2024    ___________________________________________________________________________  Important contact and scheduling information:  Please call our contact center at [phone] to schedule your next appointments.  For any nursing questions or concerns call Niurka Haque LPN at [phone] or Denise Hubbard RN at [phone]  Please call during clinic hours Monday through Friday 8:00a - 4:00p if you have questions or you can contact us via [employer] at anytime and we will reply during clinic hours.    Lab results will be communicated through My Chart or letter (if My Chart not used). Please call the clinic if you have not received communication after 1 week or if you have any questions.?  Clinic Fax: [phone]  __________________________________________________________________________    If labs were ordered today:    Please make an appointment to have them drawn at your convenience.     To schedule the Lab Appointment using [employer]:  Select \"Schedule an Appointment\"  Select \"Lab Only\"  For \"A couple of questions\", select \"Other\"  For \"Which locations work for you?, select the location and set up the appointment    To schedule by phone call " [phone] to schedule a lab only appointment at any [facility] lab.  ___________________________________________________________________________  Work with A Health !  Virtual Sessions are Available through [facility] Weight Management Clinics    To learn more, call to schedule a free, Health  Q&A appointment: [phone]     What is Health Coaching?  Do you know what you are supposed to do, but you just aren't doing it?  Then, HEALTH COACHING may help you!   Get unstuck and move forward with the support of a professionally trained NBC-HWC (National Board-Certified Health and ) who uses evidence-based approaches to help you move forward with healthy lifestyle changes in the areas of weight loss, stress management and overall well-being.    Health Coaches help you identify goals that will work best for you. Health Coaches provide support and encouragement with overcoming barriers and help you to find inspiration and motivation to lead a healthy lifestyle.    Option one:  Health Coaching 3-Pack; Three, 30-minute Health Coaching Visits, for $99  Visits are done virtually (phone or video)  This is a self pay service; we do not accept insurance for masni coaching.    Option two:   The 24 week Plan; 11 Health Coaching Visits, and a 7 months subscription to [employer]-- on-demand fitness, nutrition and mindfulness classes, for $499 (employee discounts may be available). Participants will also meet regularly with a weight management Medical Provider and a Registered/Licensed Dietician.  This is a self-pay service; we do not accept insurance for health coaching.    To Schedule a free Health  Q&A appointment to learn more,  call [phone].  ____________________________________________________________________  [facility]  Healthy Lifestyle Group    Healthy Lifestyle Group  This is a 60 minute virtual coaching group for those who want  "to lead a healthier lifestyle. Come together to set goals and overcome barriers in a supportive group environment. We will address the four pillars of health--nutrition, exercise, sleep and emotional well-being.  This group is highly recommended for those who are participating in the 24 week Healthy Lifestyle Plan and our Health Coaching sessions.    WHEN: This group meets the first Friday of the month, 12:30 PM - 1:30 PM online, via a zoom meeting.      FACILITATOR: Led by National Board Certified Health and , Jacklyn Benedict [facility].    TO REGISTER: Please call the Call Center at [phone] to register. You will get an appointment to [place]. Fifteen minutes prior to the meeting, complete the e-check in and you will get the link to join the meeting.  There is no charge to attend this group and space is limited.      2023 and 2024 Meeting Topics and Dates:    November 3: Introduction to Mindfulness (Learn simple and effective mindfulness practices and how it can benefit you)    December 8: Let's Talk (guided discussion on our wins and challenges)    January 5: New Years Vision: Manifest your Best 2024! (Guided imagery,  journaling and discussion)    February 2: Let's Talk    March 1: 10 Percent Happier by Laureano Avina (Book Bites; a guided discussion on the nuggets of wisdom from favorite wellness books; no need to read the book but highly encouraged)    April 5: Let's Talk    May 3: \"Essentialism; The Disciplined Pursuit of Less by Balta Cartwright (book bites discussion)    June 7: Let's Talk    July 5: NO MEETING, off for the 4th of July Holiday    August 2: The Blue Zones, Secrets for Living a Longer Life by Laureano Mike (book bites discussion)      If you would like bariatric surgery specific support group info please let your care team know.         Thank you,   [facility] Comprehensive Weight Management Team                          "

## 2024-04-13 NOTE — PROGRESS NOTES
I called and spoke with patient. He had further questions so I said I would consult with provider and call him back. I consulted with provider Panda. He advised he was following up on Uric acid lab results. Patient should continue treatment plan ordered by the provider who ordered the test. Patient will need to follow up with primary about lab results. Called and left message for patient.   Chelsi Mustafa MA

## 2024-04-15 ENCOUNTER — DOCUMENTATION ONLY (OUTPATIENT)
Dept: FAMILY MEDICINE | Facility: CLINIC | Age: 44
End: 2024-04-15
Payer: COMMERCIAL

## 2024-04-15 DIAGNOSIS — Z13.9 SCREENING FOR CONDITION: Primary | ICD-10-CM

## 2024-04-16 ENCOUNTER — CARE COORDINATION (OUTPATIENT)
Dept: SLEEP MEDICINE | Facility: CLINIC | Age: 44
End: 2024-04-16

## 2024-04-16 ENCOUNTER — LAB (OUTPATIENT)
Dept: LAB | Facility: CLINIC | Age: 44
End: 2024-04-16
Payer: COMMERCIAL

## 2024-04-16 DIAGNOSIS — F33.9 RECURRENT MAJOR DEPRESSION (H): Primary | ICD-10-CM

## 2024-04-16 PROCEDURE — 80061 LIPID PANEL: CPT

## 2024-04-16 PROCEDURE — 36415 COLL VENOUS BLD VENIPUNCTURE: CPT

## 2024-04-16 NOTE — PROGRESS NOTES
DME/Cpap orders have been faxed to [facility] at fax: [phone].   Monisha Patel MA

## 2024-04-16 NOTE — PROGRESS NOTES
"Video-Visit Details    Type of service:  Video Visit    Video Start Time: 2:09 pm  Video End Time:  2:36 pm    Originating Location (pt. Location): Home    Distant Location (provider location):  Offsite (providers home)     Platform used for Video Visit: [facility]    Nutrition Consultation Note    Reason For Visit: Nutrition Assessment    Kimo Greenwood is a 43 year old presenting today for return nutrition consult.   Pt is interested in laparoscopic sleeve gastrectomy.  Will proceed with MWM at this time per pt/his mother. Did NOT review any surgery dietary guidelines yet.     Pt referred by JERRY Marinelli on January 10, 2023.    Coordination note (if pursuing surgery in future)   Needs baseline labs - Done 1/9/23, would need to be re-done due to being outdated   Needs Psych eval  Needs PCP letter of support  40 lb weight loss from initial  Surgeon meet and greet with Dr. Madrigal  Needs letter of support from therapist  Needs letter of support from psychiatrist       CO-MORBIDITIES OF OBESITY INCLUDE:        1/9/2023     1:43 PM   --   I have the following health issues associated with obesity None of the above     PMH:     Per PA note:  \"Hip OA - needing hip replacement.      LOREN - uses CPAP every night. Followed by PCP      Barretts esophagus - has not had any GERD symptoms recently. Well controlled on PPI.      HTN - well controlled on medications      Degenerative disk disease - causes low back pain. 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 Followed by urology\"    Depression - has therapist/psych, hx of suicide attempt per questionnaire     Speech/Language delay    SUPPORT:      1/9/2023     1:43 PM   Support System Reviewed With Patient   Who do you have in your support network that can be available to help you for the first 2 weeks after surgery? agency   Who can you count on for support throughout your weight loss surgery journey? fully supportive       ANTHROPOMETRICS:  Consult weight " 1/9/23: 407 lbs with BMI 55.28    Estimated body mass index is 49.37 kg/m  as calculated from the following:    Height as of this encounter: 1.829 m (6').    Weight as of this encounter: 165.1 kg (364 lb).     Current weight: 364 lbs, pt report    Goal weight for hip replacement per Nya Camarena's note: 330 lbs         No data to display                    1/9/2023     1:43 PM   Weight Loss Questions Reviewed With Patient   How long have you been overweight? Since early childhood     MEDICATIONS FOR WEIGHT LOSS:  Zepbound     Hx   Saxenda -  Had been denied at first but was able to get with appeal per pt  Topiramate (ineffective)    SUPPLEMENT INFORMATION:  Vitamin D3 5,000 international unit(s)/day  B complex  Iron - recently started due to anemia     Labs 6/19/23  Vit D WNL  TSH  WNL    Hx of Vit D def    NUTRITION HISTORY:  Pt present today with his mom and legal guardian, Belen.     Patient is considering Bariatric surgery. Hoping to lose weight for hip replacement with goal of 330 lbs. Weight gain due to changes in diet and decrease in activity.     Wants to work on MWM first and consider surgical approach down the line as appropriate.     Worked with a RD a long time ago at [facility] for weight loss.     Mobility is very limited.   Moving into an apartment with a pool next month.     Per PA note (not yet finalized at time of writing this)    Eating 2 meals a day, with snacks. Trying to decrease pop. Meals are provided by group home. However, will go to the store to get candy, chip, cookies, crackers, ice cream, and pudding, and soda. Has increased hunger. Struggles with getting full. From exam, however Willard have increased hunger,   Breakfast - cereal with milk   Lunch - provided by group home  Dinner - pizza   Snacks - cookies, animal crackers   Pop - 1 liter of regular pop at a time.      Activity - Hip pain limits activity. Will try to get up and walk around as much as possible. Cannot walk more then 50 yards at  a time.     Please see previous RD notes for more detail    Jan 2024:    Pt wanted to discuss how to manage diarrhea.   Deferred on discussing other goals - will carry over to next month.    BM: diarrhea currently - started Sunday. taking generic imodium     Feb 2024:    Diarrhea resolved per pt.    Saw Nya Camarena 2/22/24. Plan to stop Wegovy and transition to Zepbound. Zepbound will be coming tomorrow. Last Wegovy shot was last Wednesday.     MK also placed a pool therapy referral.     Switched pop to fresca (drinking some mtn dew still too)- limiting to 2L/week.     Eating 2 meals/day, occ snacking.   Working on smaller portions and more protein.   Main proteins lately: beef  Fruit - got a  recently so has been doing smoothies. Will do sherbet, apple juice and fruit.   Vegetables - corn a few times at [employer].   Working on making better choices when at store    Eating on salad while on visit today.     PA: limited by knee/hip pain. Does have pool in apartment building      April 2024:    Pt reports he got Gout last month- was eating a lot of steak/worley.  Went to urgent care - got steroids to help. Uric acid levels were high per pt/per chart review.    Eating 2 meals/day and a snack.     Hydration: working on less pop - has been doing fresca more often over other options. Also doing water with flavor packs    Previous goals:  Continue aiming for 2L or less of soda per week - Met this past week per pt. Was doing more prior to this. Was buying 2 1L/week and occ getting some at the gas station too. Now working on having less. Only bought 1 1L this past week  Continue aiming for 1 quart of chocolate milk every other week - Met  Continue with 1 pint of ice cream every week - Did not discuss today  Consider making a meal with broccoli and/or a meal with asparagus - Not able to do asparagus right now due to gout. Wants to work on increasing vegetables. Discussed ideas. Would still like to try the baked potato  meal  Example: loaded baked potato with broccoli and cheese on top    Example: salmon with roasted asparagus   Example: turkey worley wrapped asparagus or ham wrapped asparagus     5.  Consider trying halo top ice cream OR frozen yogurt ice cream (examples: target brand favorite day, Kemps) instead of Terrell [employer] Adrien's next time - Did not discuss today    Additional information:  Disabled     Single         1/9/2023     1:43 PM   Recall Diet Questions Reviewed With Patient   Describe what you typically consume for lunch (typical or most recent) candy soda(8)cans to 10cans a day   Describe what you typically consume for supper (typical or most recent) pizza   How many ounces of water, or other low calorie drinks, do you drink daily (8 oz=1 glass)? 0 oz   How many ounces of caffeine (coffee, tea, pop) do you drink daily (8 oz=1 glass)? 24 oz   How many ounces of milk do you drink daily (8 oz=1 glass) 0 oz   How often do you drink alcohol? Never           1/9/2023     1:43 PM   Eating Habits   Do you have any dietary restrictions? No   Do you currently binge eat (eat a large amount of food in a short time)? Yes   Are you an emotional eater? Yes   Do you get up to eat after falling asleep? Yes           1/9/2023     1:43 PM   Dining Out History Reviewed With Patient   How often do you dine out? Never.   Where do you dine out? (select all that apply) fast food chains   What types of food do you order when you dine out? hamburger       EXERCISE:        1/9/2023     1:43 PM   --   How often do you exercise? Never   What keeps you from being more active? Pain       NUTRITION DIAGNOSIS:  Obesity r/t long history of positive energy balance aeb BMI >30 kg/m2.    INTERVENTION:  Reviewed and modified goals  Answered pt questions  Coordination of care   Nutrition education - Plate method, fats, low kcal beverages, starchy vs non-starchy vegetables, sources of carbohydrates, lean protein vs fattier proteins, Dietary fiber  AVS and  handouts via [employer]          1/9/2023     1:43 PM   Questions Reviewed With Patient   How ready are you to make changes regarding your weight? Number 1 = Not ready at all to make changes up to 10 = very ready. 1   How confident are you that you can change? 1 = Not confident that you will be successful making changes up to 10 = very confident. 1     Expected Engagement: fair-good (good engagement during appts however pt rated confidence as a 1 per questionnaire)     GOALS:  Aim for steak/worley 1-2x/week to help with Gout symptoms   Aim for 1 bottle of water/day  Continue aiming for 1 quart of chocolate milk every other week  Aim to limit pop to 4 bottles or less/week (1 L bottle + 32 oz fountain pop or less)   Aim to increase vegetable intake as able (broccoli, carrots, salad, bell peppers)     Meal examples with vegetables:   - loaded baked potato with broccoli and cheese on top. Could add a little worley bit as well     - stuffed peppers     Dietary Fiber  Soluble fiber is recommended for both diarrhea/constipation.  Foods rich in insoluble fiber are best for constipation but not diarrhea.     Insoluble fiber adds bulk to your stool and helps food pass more quickly through the stomach and intestines. It can have a laxative effect  Soluble fiber attracts water and forms a gel-like substance with food as it s digested. Aids in diarrhea by helping pull water.     Insoluble fiber examples: wheat bran, brown rice, flaxseed, brady seed, cauliflower, zuchinni, green beans, dark leafy greens, carrots, beets, radishes, fruit skins, intact whole grains, beans/peas    Soluble fiber examples: brussel sprouts, oats, beans, peas, apples, pears, berries, carrots, sweet potatoes, oranges, nuts, seeds    *Note: some food are rich in both soluble and insoluble fiber      Fermented foods   Yogurt with live bacterial cultures   Marly clark  Vinegar   Miso   Natto   Kvass   Pickles   Olives   Kefir   Yrn Lizarragajuliaut   Probiotics: lactobacillus or acidophilus    Protein shake examples:  Premier Protein (160 Calories, 30 g protein)  Boost/Ensure Max (160 calories, 30 gm protein)   Fairlife Core Power (170 calories, 26 gm protein)  Aldi's Elevation Protein Shake (160 calories, 30 gm protein)   Equate Protein Shake (160 calories, 30 gm protein)  Slim Fast Advanced Nutrition (180 Calories, 20 g protein)  Muscle Milk, lactose-free, 17 oz bottle (210 Calories, 30 g protein)    Ideas to bring on the go:   - Nutrigrain bars   - Almonds   - Tuna    - Snack packs   - Dried fruit (try to find no sugar added)   - Yogurt   - Yogurt covered nuts     Meal ideas or components discussed: honey mustard chicken, baked beans, asparagus, broccoli, chili with beans and beef, white chicken chili with onions/peppers, fish - walleye, salmon, etc., Barilla Protein + pasta, loaded baked potatoe, sweet potatoe breakfast skillet with eggs, tuna salad, tuna casserole     RESOURCES:     Plate method can be used for general guidance on balanced meals/portion sizes (see link below for picture/more information)                 Make 1/2 your plate non starchy vegetables (cauliflower, broccoli, asparagus, Aneta sprouts, lettuce, carrots, for example).                5+ oz lean protein sources (salmon/skinless chicken/turkey breast/pork loin/lean cuts of beef/ or non-animal protein such as black, kidney or kuo beans, tofu/edamame/tempeh)     (Note: 3 oz = deck of cards size)                 ~1 cup carbohydrate choices such as whole grain starches or starchy vegetables or fruit. For example: quinoa, brown rice, barley, potatoes, sweet potatoes, winter squash, peas, corn, or fruit.               Choose ~0-2 added fat servings at a meal (avocados, nut butters, nuts or seeds, olive oil, vegetable oils).    The Plate  Method:  https://www.cdc.gov/diabetes/images/managing/Diabetes-Manage-Eat-Well-Plate-Graphic_600px.jpg    Building a Plate  Http://[email or web address]    Protein Sources for Weight Loss  [email or web address]     Carbohydrates  [email or web address]     Mindful Eating  [email or web address]     Summary of Volumetrics Eating Plan  [email or web address]     Seated Exercises for Arms and Legs (can be done before or after surgery)  [email or web address]    Follow up:    Friday, May 24th at 1:00 pm    Time spent with patient: 27 minutes.  DHEERAJ Galicia, RD, LD

## 2024-04-17 ENCOUNTER — OFFICE VISIT (OUTPATIENT)
Dept: ORTHOPEDICS | Facility: CLINIC | Age: 44
End: 2024-04-17
Payer: COMMERCIAL

## 2024-04-17 DIAGNOSIS — M17.0 PRIMARY OSTEOARTHRITIS OF BOTH KNEES: Primary | ICD-10-CM

## 2024-04-17 LAB
CHOLEST SERPL-MCNC: 161 MG/DL
FASTING STATUS PATIENT QL REPORTED: YES
HDLC SERPL-MCNC: 41 MG/DL
LDLC SERPL CALC-MCNC: 94 MG/DL
NONHDLC SERPL-MCNC: 120 MG/DL
TRIGL SERPL-MCNC: 128 MG/DL

## 2024-04-17 PROCEDURE — 99207 PR DROP WITH A PROCEDURE: CPT | Performed by: STUDENT IN AN ORGANIZED HEALTH CARE EDUCATION/TRAINING PROGRAM

## 2024-04-17 PROCEDURE — 20611 DRAIN/INJ JOINT/BURSA W/US: CPT | Mod: 50 | Performed by: STUDENT IN AN ORGANIZED HEALTH CARE EDUCATION/TRAINING PROGRAM

## 2024-04-17 RX ORDER — HYALURONATE SODIUM 10 MG/ML
20 SYRINGE (ML) INTRAARTICULAR
Status: SHIPPED | OUTPATIENT
Start: 2024-04-17

## 2024-04-17 RX ORDER — LIDOCAINE HYDROCHLORIDE 10 MG/ML
3 INJECTION, SOLUTION INFILTRATION; PERINEURAL
Status: SHIPPED | OUTPATIENT
Start: 2024-04-17

## 2024-04-17 RX ADMIN — LIDOCAINE HYDROCHLORIDE 3 ML: 10 INJECTION, SOLUTION INFILTRATION; PERINEURAL at 13:55

## 2024-04-17 RX ADMIN — Medication 20 MG: at 13:55

## 2024-04-17 NOTE — PROGRESS NOTES
Sports Medicine Procedure Note    Willard was seen today for follow up and bilateral third Euflexxa injection.  Diagnoses and all orders for this visit:    Primary osteoarthritis of both knees  -     Large Joint Injection/Arthocentesis: bilateral knee        Kimo Greenwood is a/an 43 year old male who is seen for Euflexxa injection of both knees.   Last injection: second Euflexxa dose on 04/10/2024    -Injection 3/3 today. Tolerated well.   -Previously required draining and aspirating effusion to knees. No effusion to be drained today under US. Feeling pain improved with injections.   Will follow-up with Dr. Yeo with next steps if pain not improved. Discussed expectations of injection. Can repeat 6 months if needed.   -Previously has received consent from [facility]. Reviewed today.   -Post-injection instructions were provided to the patient  -Return sooner if develops new or worsening symptoms.    Options for treatment and/or follow-up care were reviewed with the patient was actively involved in the decision making process. Patient verbalized understanding and was in agreement with the plan.     Discussed risks and benefits. they are aware of risks such as skin hypopigmentation, bleeding, and infection.       Large Joint Injection/Arthocentesis: bilateral knee    Date/Time: 4/17/2024 1:55 PM    Performed by: Marline Le DO  Authorized by: Marline Le DO    Indications:  Pain and osteoarthritis  Needle Size:  25 G  Guidance: ultrasound    Approach:  Anterolateral  Location:  Knee  Laterality:  Bilateral      Medications (Right):  20 mg sodium hyaluronate 20 MG/2ML; 3 mL lidocaine 1 %  Medications (Left):  20 mg sodium hyaluronate 20 MG/2ML; 3 mL lidocaine 1 %  Outcome:  Tolerated well, no immediate complications  Procedure discussed: discussed risks, benefits, and alternatives    Consent Given by:  Patient  Timeout: timeout called immediately prior to procedure    Prep: patient was prepped and draped in usual  sterile fashion     Ultrasound was used to ensure safe and accurate needle placement and injection. Ultrasound images of the procedure were permanently stored.        Marline Le DO  [facility] [place]      Post-Hyaluronic Acid Injection Discharge Instructions    - Post-Hyaluronic Acid Injection Discharge Instructions    Eufflexxa injection was performed today in clinic  - Ok to shower today, but please do not submerge for 48 hours (including bath tub, hot tub or swimming)  - The lidocaine (local numbing medicine) will wear off in several hours. The injection usually takes several weeks to begin working, so you may not notice a difference in pain for 3-6 weeks.  - Injection has a small risk of increasing pain/swelling. If this occurs, it generally happens within 24 hours but will usually resolve on its own. You may use ice packs for 15-20 minutes, 3 to 4 times a day at the injection site for comfort if needed, and I recommend limiting your activity. If pain/swelling continues, then please the office.  -Can repeat injections at 6 months with insurance authorization    If you experience any of the following, call Sports Medicine @ [phone] or [phone]  -Fever over 100 degrees F  -Swelling, bleeding, redness, drainage, warmth at the injection site  -New or significant worsening pain    If you experience any of the following, call Sports Medicine @ [phone] or [phone]  -Fever over 100 degrees F  -Swelling, bleeding, redness, drainage, warmth at the injection site  -New or significant worsening pain          Dr. Marline Le, DO  [facility]  Sports Medicine

## 2024-04-17 NOTE — PATIENT INSTRUCTIONS
1. Primary osteoarthritis of both knees      - Post-Hyaluronic Acid Injection Discharge Instructions    Eufflexxa injection was performed today in clinic  - Ok to shower today, but please do not submerge for 48 hours (including bath tub, hot tub or swimming)  - The lidocaine (local numbing medicine) will wear off in several hours. The injection usually takes several weeks to begin working, so you may not notice a difference in pain for 3-6 weeks.  - Injection has a small risk of increasing pain/swelling. If this occurs, it generally happens within 24 hours but will usually resolve on its own. You may use ice packs for 15-20 minutes, 3 to 4 times a day at the injection site for comfort if needed, and I recommend limiting your activity. If pain/swelling continues, then please the office.  -Can repeat injections at 6 months with insurance authorization    If you experience any of the following, call Sports Medicine @ [phone] or [phone]  -Fever over 100 degrees F  -Swelling, bleeding, redness, drainage, warmth at the injection site  -New or significant worsening pain      Please call [phone]  Ask for my team if you have any questions or concerns    Marline Le DO  [facility]      Thank you for choosing [facility] Sports Medicine!    CLINIC LOCATIONS:     [place]  TRIAGE LINE: [phone] [address] APPOINTMENTS: [phone]   [place] [address] RADIOLOGY: [phone]   (Monday, Thursday & Friday) PHYSICAL THERAPY: [phone]    BILLING QUESTIONS: [phone]   [place] FAX: [phone] [address]    [place] [address]    (Wednesday)

## 2024-04-17 NOTE — LETTER
4/17/2024         RE: Kimo Greenwood  [address]  [place] [address]        Dear Colleague,    Thank you for referring your patient, Kimo Greenwood, to the [facility] [place]. Please see a copy of my visit note below.    Sports Medicine Procedure Note    Willard was seen today for follow up and bilateral third Euflexxa injection.  Diagnoses and all orders for this visit:    Primary osteoarthritis of both knees  -     Large Joint Injection/Arthocentesis: bilateral knee        Kimo Greenwood is a/an 43 year old male who is seen for Euflexxa injection of both knees.   Last injection: second Euflexxa dose on 04/10/2024    -Injection 3/3 today. Tolerated well.   -Previously required draining and aspirating effusion to knees. No effusion to be drained today under US. Feeling pain improved with injections.   Will follow-up with Dr. Yeo with next steps if pain not improved. Discussed expectations of injection. Can repeat 6 months if needed.   -Previously has received consent from [facility]. Reviewed today.   -Post-injection instructions were provided to the patient  -Return sooner if develops new or worsening symptoms.    Options for treatment and/or follow-up care were reviewed with the patient was actively involved in the decision making process. Patient verbalized understanding and was in agreement with the plan.     Discussed risks and benefits. they are aware of risks such as skin hypopigmentation, bleeding, and infection.       Large Joint Injection/Arthocentesis: bilateral knee    Date/Time: 4/17/2024 1:55 PM    Performed by: Marline Le DO  Authorized by: Marline Le DO    Indications:  Pain and osteoarthritis  Needle Size:  25 G  Guidance: ultrasound    Approach:  Anterolateral  Location:  Knee  Laterality:  Bilateral      Medications (Right):  20 mg sodium hyaluronate 20 MG/2ML; 3 mL lidocaine 1 %  Medications (Left):  20 mg sodium hyaluronate  20 MG/2ML; 3 mL lidocaine 1 %  Outcome:  Tolerated well, no immediate complications  Procedure discussed: discussed risks, benefits, and alternatives    Consent Given by:  Patient  Timeout: timeout called immediately prior to procedure    Prep: patient was prepped and draped in usual sterile fashion     Ultrasound was used to ensure safe and accurate needle placement and injection. Ultrasound images of the procedure were permanently stored.        Marline Le DO  [facility]IC[facility] [place]      Post-Hyaluronic Acid Injection Discharge Instructions    - Post-Hyaluronic Acid Injection Discharge Instructions    Eufflexxa injection was performed today in clinic  - Ok to shower today, but please do not submerge for 48 hours (including bath tub, hot tub or swimming)  - The lidocaine (local numbing medicine) will wear off in several hours. The injection usually takes several weeks to begin working, so you may not notice a difference in pain for 3-6 weeks.  - Injection has a small risk of increasing pain/swelling. If this occurs, it generally happens within 24 hours but will usually resolve on its own. You may use ice packs for 15-20 minutes, 3 to 4 times a day at the injection site for comfort if needed, and I recommend limiting your activity. If pain/swelling continues, then please the office.  -Can repeat injections at 6 months with insurance authorization    If you experience any of the following, call Sports Medicine @ [phone] or [phone]  -Fever over 100 degrees F  -Swelling, bleeding, redness, drainage, warmth at the injection site  -New or significant worsening pain    If you experience any of the following, call Sports Medicine @ [phone] or [phone]  -Fever over 100 degrees F  -Swelling, bleeding, redness, drainage, warmth at the injection site  -New or significant worsening pain          Dr. Marline Le, DO  [facility]  Sports Medicine        Again, thank you for allowing me to participate in the care of your patient.        Sincerely,        Marline Le, DO

## 2024-04-18 ENCOUNTER — VIRTUAL VISIT (OUTPATIENT)
Dept: ENDOCRINOLOGY | Facility: CLINIC | Age: 44
End: 2024-04-18
Payer: COMMERCIAL

## 2024-04-18 VITALS — BODY MASS INDEX: 42.66 KG/M2 | HEIGHT: 72 IN | WEIGHT: 315 LBS

## 2024-04-18 DIAGNOSIS — E66.9 OBESITY: ICD-10-CM

## 2024-04-18 DIAGNOSIS — M10.9 GOUT: ICD-10-CM

## 2024-04-18 DIAGNOSIS — Z71.3 NUTRITIONAL COUNSELING: Primary | ICD-10-CM

## 2024-04-18 PROCEDURE — 99207 PR NO CHARGE LOS: CPT | Mod: 95 | Performed by: DIETITIAN, REGISTERED

## 2024-04-18 PROCEDURE — 97803 MED NUTRITION INDIV SUBSEQ: CPT | Mod: 95 | Performed by: DIETITIAN, REGISTERED

## 2024-04-18 ASSESSMENT — PAIN SCALES - GENERAL: PAINLEVEL: SEVERE PAIN (6)

## 2024-04-18 NOTE — NURSING NOTE
Is the patient currently in the state of [place]? YES    Visit mode:VIDEO    If the visit is dropped, the patient can be reconnected by: VIDEO VISIT: Send to e-mail at: [email or web address]    Will anyone else be joining the visit? NO  (If patient encounters technical issues they should call [phone])    How would you like to obtain your AVS? MyChart    Are changes needed to the allergy or medication list? N/A    Are refills needed on medications prescribed by this physician? NO     Reason for visit: RECHECK    Wt/ht other than 24 hrs:    Pain more than one location:  no  Yesenia NAIR

## 2024-04-18 NOTE — LETTER
"4/18/2024       RE: Kimo Greenwood  [address]  [facility] [address]     Dear Colleague,    Thank you for referring your patient, Kimo Greenwood, to the [facility] [place] at [facility]. Please see a copy of my visit note below.    Video-Visit Details    Type of service:  Video Visit    Video Start Time: 2:09 pm  Video End Time:  2:36 pm    Originating Location (pt. Location): Home    Distant Location (provider location):  Offsite (providers home)     Platform used for Video Visit: Localytics    Nutrition Consultation Note    Reason For Visit: Nutrition Assessment    Kimo Greenwood is a 43 year old presenting today for return nutrition consult.   Pt is interested in laparoscopic sleeve gastrectomy.  Will proceed with MWM at this time per pt/his mother. Did NOT review any surgery dietary guidelines yet.     Pt referred by JERRY Marinelli on January 10, 2023.    Coordination note (if pursuing surgery in future)   Needs baseline labs - Done 1/9/23, would need to be re-done due to being outdated   Needs Psych eval  Needs PCP letter of support  40 lb weight loss from initial  Surgeon meet and greet with Dr. Madrigal  Needs letter of support from therapist  Needs letter of support from psychiatrist       CO-MORBIDITIES OF OBESITY INCLUDE:        1/9/2023     1:43 PM   --   I have the following health issues associated with obesity None of the above     PMH:     Per PA note:  \"Hip OA - needing hip replacement.      LOREN - uses CPAP every night. Followed by PCP      Barretts esophagus - has not had any GERD symptoms recently. Well controlled on PPI.      HTN - well controlled on medications      Degenerative disk disease - causes low back pain. 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 Followed by urology\"    Depression - has therapist/psych, hx of suicide attempt per questionnaire     Speech/Language " delay    SUPPORT:      1/9/2023     1:43 PM   Support System Reviewed With Patient   Who do you have in your support network that can be available to help you for the first 2 weeks after surgery? agency   Who can you count on for support throughout your weight loss surgery journey? fully supportive       ANTHROPOMETRICS:  Consult weight 1/9/23: 407 lbs with BMI 55.28    Estimated body mass index is 49.37 kg/m  as calculated from the following:    Height as of this encounter: 1.829 m (6').    Weight as of this encounter: 165.1 kg (364 lb).     Current weight: 364 lbs, pt report    Goal weight for hip replacement per Nya Camarena's note: 330 lbs         No data to display                    1/9/2023     1:43 PM   Weight Loss Questions Reviewed With Patient   How long have you been overweight? Since early childhood     MEDICATIONS FOR WEIGHT LOSS:  Zepbound     Hx   Saxenda -  Had been denied at first but was able to get with appeal per pt  Topiramate (ineffective)    SUPPLEMENT INFORMATION:  Vitamin D3 5,000 international unit(s)/day  B complex  Iron - recently started due to anemia     Labs 6/19/23  Vit D WNL  TSH  WNL    Hx of Vit D def    NUTRITION HISTORY:  Pt present today with his mom and legal guardian, Belen.     Patient is considering Bariatric surgery. Hoping to lose weight for hip replacement with goal of 330 lbs. Weight gain due to changes in diet and decrease in activity.     Wants to work on MWM first and consider surgical approach down the line as appropriate.     Worked with a RD a long time ago at [facility] for weight loss.     Mobility is very limited.   Moving into an apartment with a pool next month.     Per PA note (not yet finalized at time of writing this)    Eating 2 meals a day, with snacks. Trying to decrease pop. Meals are provided by group home. However, will go to the store to get candy, chip, cookies, crackers, ice cream, and pudding, and soda. Has increased hunger. Struggles with getting  full. From exam, however [place] have increased hunger,   Breakfast - cereal with milk   Lunch - provided by group home  Dinner - pizza   Snacks - cookies, animal crackers   Pop - 1 liter of regular pop at a time.      Activity - Hip pain limits activity. Will try to get up and walk around as much as possible. Cannot walk more then 50 yards at a time.     Please see previous RD notes for more detail    Jan 2024:    Pt wanted to discuss how to manage diarrhea.   Deferred on discussing other goals - will carry over to next month.    BM: diarrhea currently - started Sunday. taking generic imodium     Feb 2024:    Diarrhea resolved per pt.    Saw Nya Camarena 2/22/24. Plan to stop Wegovy and transition to Zepbound. Zepbound will be coming tomorrow. Last Wegovy shot was last Wednesday.     MK also placed a pool therapy referral.     Switched pop to fresca (drinking some mtn dew still too)- limiting to 2L/week.     Eating 2 meals/day, occ snacking.   Working on smaller portions and more protein.   Main proteins lately: beef  Fruit - got a  recently so has been doing smoothies. Will do sherbet, apple juice and fruit.   Vegetables - corn a few times at [employer].   Working on making better choices when at store    Eating on salad while on visit today.     PA: limited by knee/hip pain. Does have pool in apartment building      April 2024:    Pt reports he got Gout last month- was eating a lot of steak/worley.  Went to urgent care - got steroids to help. Uric acid levels were high per pt/per chart review.    Eating 2 meals/day and a snack.     Hydration: working on less pop - has been doing fresca more often over other options. Also doing water with flavor packs    Previous goals:  Continue aiming for 2L or less of soda per week - Met this past week per pt. Was doing more prior to this. Was buying 2 1L/week and occ getting some at the gas station too. Now working on having less. Only bought 1 1L this past week  Continue  aiming for 1 quart of chocolate milk every other week - Met  Continue with 1 pint of ice cream every week - Did not discuss today  Consider making a meal with broccoli and/or a meal with asparagus - Not able to do asparagus right now due to gout. Wants to work on increasing vegetables. Discussed ideas. Would still like to try the baked potato meal  Example: loaded baked potato with broccoli and cheese on top    Example: salmon with roasted asparagus   Example: turkey worley wrapped asparagus or ham wrapped asparagus     5.  Consider trying halo top ice cream OR frozen yogurt ice cream (examples: target brand favorite day, [place]) instead of [employer]rell [employer] Adrien's next time - Did not discuss today    Additional information:  Disabled     Single         1/9/2023     1:43 PM   Recall Diet Questions Reviewed With Patient   Describe what you typically consume for lunch (typical or most recent) candy soda(8)cans to 10cans a day   Describe what you typically consume for supper (typical or most recent) pizza   How many ounces of water, or other low calorie drinks, do you drink daily (8 oz=1 glass)? 0 oz   How many ounces of caffeine (coffee, tea, pop) do you drink daily (8 oz=1 glass)? 24 oz   How many ounces of milk do you drink daily (8 oz=1 glass) 0 oz   How often do you drink alcohol? Never           1/9/2023     1:43 PM   Eating Habits   Do you have any dietary restrictions? No   Do you currently binge eat (eat a large amount of food in a short time)? Yes   Are you an emotional eater? Yes   Do you get up to eat after falling asleep? Yes           1/9/2023     1:43 PM   Dining Out History Reviewed With Patient   How often do you dine out? Never.   Where do you dine out? (select all that apply) fast food chains   What types of food do you order when you dine out? hamburger       EXERCISE:        1/9/2023     1:43 PM   --   How often do you exercise? Never   What keeps you from being more active? Pain       NUTRITION  DIAGNOSIS:  Obesity r/t long history of positive energy balance aeb BMI >30 kg/m2.    INTERVENTION:  Reviewed and modified goals  Answered pt questions  Coordination of care   Nutrition education - Plate method, fats, low kcal beverages, starchy vs non-starchy vegetables, sources of carbohydrates, lean protein vs fattier proteins, Dietary fiber  AVS and handouts via [employer]          1/9/2023     1:43 PM   Questions Reviewed With Patient   How ready are you to make changes regarding your weight? Number 1 = Not ready at all to make changes up to 10 = very ready. 1   How confident are you that you can change? 1 = Not confident that you will be successful making changes up to 10 = very confident. 1     Expected Engagement: fair-good (good engagement during appts however pt rated confidence as a 1 per questionnaire)     GOALS:  Aim for steak/worley 1-2x/week to help with Gout symptoms   Aim for 1 bottle of water/day  Continue aiming for 1 quart of chocolate milk every other week  Aim to limit pop to 4 bottles or less/week (1 L bottle + 32 oz fountain pop or less)   Aim to increase vegetable intake as able (broccoli, carrots, salad, bell peppers)     Meal examples with vegetables:   - loaded baked potato with broccoli and cheese on top. Could add a little worley bit as well     - stuffed peppers     Dietary Fiber  Soluble fiber is recommended for both diarrhea/constipation.  Foods rich in insoluble fiber are best for constipation but not diarrhea.     Insoluble fiber adds bulk to your stool and helps food pass more quickly through the stomach and intestines. It can have a laxative effect  Soluble fiber attracts water and forms a gel-like substance with food as it s digested. Aids in diarrhea by helping pull water.     Insoluble fiber examples: wheat bran, brown rice, flaxseed, brady seed, cauliflower, zuchinni, green beans, dark leafy greens, carrots, beets, radishes, fruit skins, intact whole grains, beans/peas    Soluble  fiber examples: brussel sprouts, oats, beans, peas, apples, pears, berries, carrots, sweet potatoes, oranges, nuts, seeds    *Note: some food are rich in both soluble and insoluble fiber      Fermented foods   Yogurt with live bacterial cultures   Kimichi   Apple cider Vinegar   Miso   Natto   Kvass   Pickles   Olives   Kefir   Kombuchrosey   Saanastasiiaaut   Probiotics: lactobacillus or acidophilus    Protein shake examples:  Premier Protein (160 Calories, 30 g protein)  Boost/Ensure Max (160 calories, 30 gm protein)   Fairlife Core Power (170 calories, 26 gm protein)  Aldi's Elevation Protein Shake (160 calories, 30 gm protein)   Equate Protein Shake (160 calories, 30 gm protein)  Slim Fast Advanced Nutrition (180 Calories, 20 g protein)  Muscle Milk, lactose-free, 17 oz bottle (210 Calories, 30 g protein)    Ideas to bring on the go:   - Nutrigrain bars   - Almonds   - Tuna    - Snack packs   - Dried fruit (try to find no sugar added)   - Yogurt   - Yogurt covered nuts     Meal ideas or components discussed: honey mustard chicken, baked beans, asparagus, broccoli, chili with beans and beef, white chicken chili with onions/peppers, fish - walleye, salmon, etc., Barilla Protein + pasta, loaded baked potatoe, sweet potatoe breakfast skillet with eggs, tuna salad, tuna casserole     RESOURCES:     Plate method can be used for general guidance on balanced meals/portion sizes (see link below for picture/more information)                 Make 1/2 your plate non starchy vegetables (cauliflower, broccoli, asparagus, [place] sprouts, lettuce, carrots, for example).                5+ oz lean protein sources (salmon/skinless chicken/turkey breast/pork loin/lean cuts of beef/ or non-animal protein such as black, kidney or kuo beans, tofu/edamame/tempeh)     (Note: 3 oz = deck of cards size)                 ~1 cup carbohydrate choices such as whole grain starches or starchy vegetables or fruit. For example: quinoa, brown rice,  barley, potatoes, sweet potatoes, winter squash, peas, corn, or fruit.               Choose ~0-2 added fat servings at a meal (avocados, nut butters, nuts or seeds, olive oil, vegetable oils).    The Plate Method:  https://www.cdc.gov/diabetes/images/managing/Diabetes-Manage-Eat-Well-Plate-Graphic_600px.jpg    Building a Plate  [email or web address]    Protein Sources for Weight Loss  [email or web address]     Carbohydrates  [email or web address]     Mindful Eating  [email or web address]     Summary of Volumetrics Eating Plan  [email or web address]     Seated Exercises for Arms and Legs (can be done before or after surgery)  [email or web address]    Follow up:    Friday, May 24th at 1:00 pm    Time spent with patient: 27 minutes.  DHEERAJ Galicia, RD, LD

## 2024-04-19 NOTE — PATIENT INSTRUCTIONS
GOALS:  Aim for steak/worley 1-2x/week to help with Gout symptoms   Aim for 1 bottle of water/day  Continue aiming for 1 quart of chocolate milk every other week  Aim to limit pop to 4 bottles or less/week (1 L bottle + 32 oz fountain pop or less)   Aim to increase vegetable intake as able (broccoli, carrots, salad, bell peppers)     Meal examples with vegetables:   - loaded baked potato with broccoli and cheese on top. Could add a little worley bit as well     - stuffed peppers     Soluble fiber is recommended for both diarrhea/constipation.  Foods rich in insoluble fiber are best for constipation but not diarrhea.   *I think I misspoke and said this opposite when we talked - sorry!    Insoluble fiber adds bulk to your stool and helps food pass more quickly through the stomach and intestines. It can have a laxative effect  Soluble fiber attracts water and forms a gel-like substance with food as it s digested. Aids in diarrhea by helping pull water.     Insoluble fiber examples: wheat bran, brown rice, flaxseed, brady seed, cauliflower, zuchinni, green beans, dark leafy greens, carrots, beets, radishes, fruit skins, intact whole grains, beans/peas    Soluble fiber examples: brussel sprouts, oats, beans, peas, apples, pears, berries, carrots, sweet potatoes, oranges, nuts, seeds    *Note: some food are rich in both soluble and insoluble fiber      Fermented foods   Yogurt with live bacterial cultures   Kimichi   Apple cider Vinegar   Miso   Natto   Kvass   Pickles   Olives   Kefir   Yrn Pereyra   Probiotics: lactobacillus or acidophilus    Protein shake examples:  Premier Protein (160 Calories, 30 g protein)  Boost/Ensure Max (160 calories, 30 gm protein)   Fairlife Core Power (170 calories, 26 gm protein)  Aldi's Elevation Protein Shake (160 calories, 30 gm protein)   Equate Protein Shake (160 calories, 30 gm protein)  Slim Fast Advanced Nutrition (180 Calories, 20 g protein)  Muscle Milk, lactose-free, 17  oz bottle (210 Calories, 30 g protein)    Ideas to bring on the go:   - Nutrigrain bars   - Almonds   - Tuna    - Snack packs   - Dried fruit (try to find no sugar added)   - Yogurt   - Yogurt covered nuts     Meal ideas or components discussed: honey mustard chicken, baked beans, asparagus, broccoli, chili with beans and beef, white chicken chili with onions/peppers, fish - walleye, salmon, etc., Barilla Protein + pasta, loaded baked potatoe, sweet potatoe breakfast skillet with eggs, tuna salad, tuna casserole     RESOURCES:     Plate method can be used for general guidance on balanced meals/portion sizes (see link below for picture/more information)                 Make 1/2 your plate non starchy vegetables (cauliflower, broccoli, asparagus, [place] sprouts, lettuce, carrots, for example).                5+ oz lean protein sources (salmon/skinless chicken/turkey breast/pork loin/lean cuts of beef/ or non-animal protein such as black, kidney or kuo beans, tofu/edamame/tempeh)     (Note: 3 oz = deck of cards size)                 ~1 cup carbohydrate choices such as whole grain starches or starchy vegetables or fruit. For example: quinoa, brown rice, barley, potatoes, sweet potatoes, winter squash, peas, corn, or fruit.               Choose ~0-2 added fat servings at a meal (avocados, nut butters, nuts or seeds, olive oil, vegetable oils).    The Plate Method:  https://www.cdc.gov/diabetes/images/managing/Diabetes-Manage-Eat-Well-Plate-Graphic_600px.jpg    Building a Plate  [email or web address]    Protein Sources for Weight Loss  [email or web address]     Carbohydrates  [email or web address]     Mindful Eating  [email or web address]     Summary of Volumetrics Eating Plan  [email or web address]     Seated Exercises for Arms and Legs (can be done before or after surgery)  [email or web address]    Follow up:    Friday, May 24th at 1:00 pm    Aydee Bar  , DHEERAJ, RD, LD  Clinic #: [phone]

## 2024-04-22 ENCOUNTER — TELEPHONE (OUTPATIENT)
Dept: ENDOCRINOLOGY | Facility: CLINIC | Age: 44
End: 2024-04-22
Payer: COMMERCIAL

## 2024-04-22 NOTE — TELEPHONE ENCOUNTER
Reason for Call:  Medication or medication refill:    Do you use a [facility]?  Name of the pharmacy and phone number for the current request:  [facility] on [address]    Name of the medication requested: Wegovy    Other request: Patient has been unable to fill his rx due to shortage, please advise. Patients next dose is supposed to be Wednesday.    Can we leave a detailed message on this number? YES    Phone number patient can be reached at: Other phone number:  [phone]*    Best Time: any    Call taken on 4/22/2024 at 1:43 PM by Breanne Garnica   
Spoke with Crys, patients care coordinator, patient cannot find zepbound 10 mg anywhere for refill. Would like to increase dose, was wondering if he can take the 2 pens of wegovy 2.4 mg he has left in the meantime.    Message sent to Nya Lara to advise.  
PCP - Dr. Cabrera (notified)  Podiatry - Dr. Carrillo (notified)

## 2024-04-24 ENCOUNTER — TELEPHONE (OUTPATIENT)
Dept: ENDOCRINOLOGY | Facility: CLINIC | Age: 44
End: 2024-04-24
Payer: COMMERCIAL

## 2024-04-24 DIAGNOSIS — E66.813 CLASS 3 SEVERE OBESITY DUE TO EXCESS CALORIES WITH SERIOUS COMORBIDITY AND BODY MASS INDEX (BMI) OF 50.0 TO 59.9 IN ADULT (H): Primary | ICD-10-CM

## 2024-04-24 DIAGNOSIS — E66.01 CLASS 3 SEVERE OBESITY DUE TO EXCESS CALORIES WITH SERIOUS COMORBIDITY AND BODY MASS INDEX (BMI) OF 50.0 TO 59.9 IN ADULT (H): Primary | ICD-10-CM

## 2024-04-24 NOTE — TELEPHONE ENCOUNTER
General Call    Contacts         Type Contact Phone/Fax    04/24/2024 10:18 AM CDT Phone (Incoming) Belen Greenwood (CO GUARDIAN) (Mother) [phone] (M)          Reason for Call:  zepbound    What are your questions or concerns:  Jose Mckeon, care coordinator, would like a call back regarding Zepbound   [phone]

## 2024-04-25 ENCOUNTER — TELEPHONE (OUTPATIENT)
Dept: ENDOCRINOLOGY | Facility: CLINIC | Age: 44
End: 2024-04-25

## 2024-04-25 NOTE — TELEPHONE ENCOUNTER
Prior Authorization Approval    Medication: ZEPBOUND 12.5 MG/0.5ML SC SOAJ  Authorization Effective Date: 3/26/2024  Authorization Expiration Date: 12/21/2024  Approved Dose/Quantity: uud   Reference #: Key: [id number]   Insurance Company: MEDICA - Phone [phone] Fax [phone]  Expected CoPay: $ 0  CoPay Card Available:      Financial Assistance Needed:    Which Pharmacy is filling the prescription: [id number] - [place], [place] - [facility] [address] AT [facility]  Pharmacy Notified: Yes  Patient Notified: Yes     Address patient's insurance has on file:   [address]  [place] [address]    Key: [id number]

## 2024-04-25 NOTE — TELEPHONE ENCOUNTER
Per Laney Lara PA-C patient can increase to zepbound 12.5 mg if unable to find can go back on Wegovy 2.4 mg until shortage is over. Patient guardian Jose xena.

## 2024-05-18 ENCOUNTER — MEDICAL CORRESPONDENCE (OUTPATIENT)
Dept: HEALTH INFORMATION MANAGEMENT | Facility: CLINIC | Age: 44
End: 2024-05-18
Payer: COMMERCIAL

## 2024-05-20 ENCOUNTER — TRANSFERRED RECORDS (OUTPATIENT)
Dept: HEALTH INFORMATION MANAGEMENT | Facility: CLINIC | Age: 44
End: 2024-05-20
Payer: COMMERCIAL

## 2024-05-20 DIAGNOSIS — E66.813 CLASS 3 SEVERE OBESITY DUE TO EXCESS CALORIES WITH SERIOUS COMORBIDITY AND BODY MASS INDEX (BMI) OF 50.0 TO 59.9 IN ADULT (H): ICD-10-CM

## 2024-05-20 DIAGNOSIS — E66.01 CLASS 3 SEVERE OBESITY DUE TO EXCESS CALORIES WITH SERIOUS COMORBIDITY AND BODY MASS INDEX (BMI) OF 50.0 TO 59.9 IN ADULT (H): ICD-10-CM

## 2024-05-24 DIAGNOSIS — E66.01 CLASS 3 SEVERE OBESITY DUE TO EXCESS CALORIES WITH SERIOUS COMORBIDITY AND BODY MASS INDEX (BMI) OF 50.0 TO 59.9 IN ADULT (H): ICD-10-CM

## 2024-05-24 DIAGNOSIS — E66.813 CLASS 3 SEVERE OBESITY DUE TO EXCESS CALORIES WITH SERIOUS COMORBIDITY AND BODY MASS INDEX (BMI) OF 50.0 TO 59.9 IN ADULT (H): ICD-10-CM

## 2024-05-24 NOTE — TELEPHONE ENCOUNTER
Called and left message for Crys in regards to Zepbound prescription. Consulted with BRICE and [facility] pharmacist. 12.5mg dose is not in stock at any  pharmacy. Advised that we can send in 10mg dose, as it is widely available throughout the  pharmacy system. Asked for a call back to let us know which pharmacy they would like the 10mg dose sent to. Otherwise, offered for them to call around to see if they can track down 12.5mg dose. We can send either strength when we get a call back with pharmacy preference.

## 2024-05-24 NOTE — TELEPHONE ENCOUNTER
tirzepatide       Last Written Prescription Date:  4/25/2024   Last Fill Quantity: 2 ml,   # refills: 0  Last Office Visit: none  Future Office visit:  none     Routing refill request to provider for review/approval because:  Unable to refill due to RN protocol.  Amy Lindsay, RN on 5/24/2024 at 3:02 PM

## 2024-05-24 NOTE — TELEPHONE ENCOUNTER
tirzepatide-Weight Management (ZEPBOUND) 12.5 MG/0.5ML prefilled pen   Disp-2 mL, R-0,   Start: 04/25/2024 4/4/2024  [facility] [place]    Nya Lara PA-C  Surgery     Nv: 7/16/24    Routed because: request per pt call. Not on protocol

## 2024-05-24 NOTE — TELEPHONE ENCOUNTER
Medication Question or Refill    Contacts         Type Contact Phone/Fax    05/24/2024 09:40 AM CDT Phone (Incoming) Andrea Mike (Care Coordinator) [phone]            What medication are you calling about (include dose and sig)?:     tirzepatide-Weight Management (ZEPBOUND) 12.5 MG/0.5ML prefilled pen     Preferred Pharmacy:    Which ever [facility] has it      Controlled Substance Agreement on file:   CSA -- Patient Level:    CSA: None found at the patient level.       Who prescribed the medication?: Mingo    Do you need a refill? Yes    When did you use the medication last? Wed, one shot left    Patient offered an appointment? Yes: has one in july    Do you have any questions or concerns?  Yes:     Please call Andrea to get refill script so she can call around to find supply      Could we send this information to you in [facility] or would you prefer to receive a phone call?:   Patient would prefer a phone call   Okay to leave a detailed message?: Yes at Other phone number:  [phone]*

## 2024-05-28 ENCOUNTER — TELEPHONE (OUTPATIENT)
Dept: ENDOCRINOLOGY | Facility: CLINIC | Age: 44
End: 2024-05-28
Payer: COMMERCIAL

## 2024-05-28 DIAGNOSIS — E66.01 CLASS 3 SEVERE OBESITY DUE TO EXCESS CALORIES WITH SERIOUS COMORBIDITY AND BODY MASS INDEX (BMI) OF 50.0 TO 59.9 IN ADULT (H): ICD-10-CM

## 2024-05-28 DIAGNOSIS — E66.813 CLASS 3 SEVERE OBESITY DUE TO EXCESS CALORIES WITH SERIOUS COMORBIDITY AND BODY MASS INDEX (BMI) OF 50.0 TO 59.9 IN ADULT (H): ICD-10-CM

## 2024-05-28 NOTE — TELEPHONE ENCOUNTER
General Call      Reason for Call:  Medication questions    What are your questions or concerns:  Jose, [phone], would like a call back regarding pts meds

## 2024-06-03 ENCOUNTER — ANCILLARY PROCEDURE (OUTPATIENT)
Dept: GENERAL RADIOLOGY | Facility: CLINIC | Age: 44
End: 2024-06-03
Attending: FAMILY MEDICINE
Payer: COMMERCIAL

## 2024-06-03 ENCOUNTER — OFFICE VISIT (OUTPATIENT)
Dept: ORTHOPEDICS | Facility: CLINIC | Age: 44
End: 2024-06-03
Payer: COMMERCIAL

## 2024-06-03 VITALS
DIASTOLIC BLOOD PRESSURE: 84 MMHG | SYSTOLIC BLOOD PRESSURE: 121 MMHG | HEIGHT: 73 IN | BODY MASS INDEX: 41.75 KG/M2 | WEIGHT: 315 LBS

## 2024-06-03 DIAGNOSIS — M17.0 PRIMARY OSTEOARTHRITIS OF BOTH KNEES: ICD-10-CM

## 2024-06-03 DIAGNOSIS — E66.01 CLASS 3 SEVERE OBESITY WITH SERIOUS COMORBIDITY AND BODY MASS INDEX (BMI) OF 50.0 TO 59.9 IN ADULT, UNSPECIFIED OBESITY TYPE (H): ICD-10-CM

## 2024-06-03 DIAGNOSIS — M17.0 PRIMARY OSTEOARTHRITIS OF BOTH KNEES: Primary | ICD-10-CM

## 2024-06-03 DIAGNOSIS — E66.813 CLASS 3 SEVERE OBESITY WITH SERIOUS COMORBIDITY AND BODY MASS INDEX (BMI) OF 50.0 TO 59.9 IN ADULT, UNSPECIFIED OBESITY TYPE (H): ICD-10-CM

## 2024-06-03 PROCEDURE — 73562 X-RAY EXAM OF KNEE 3: CPT | Mod: TC | Performed by: FAMILY MEDICINE

## 2024-06-03 PROCEDURE — 99214 OFFICE O/P EST MOD 30 MIN: CPT | Mod: 25 | Performed by: FAMILY MEDICINE

## 2024-06-03 PROCEDURE — 20611 DRAIN/INJ JOINT/BURSA W/US: CPT | Mod: 50 | Performed by: FAMILY MEDICINE

## 2024-06-03 RX ORDER — ROPIVACAINE HYDROCHLORIDE 5 MG/ML
4 INJECTION, SOLUTION EPIDURAL; INFILTRATION; PERINEURAL
Status: SHIPPED | OUTPATIENT
Start: 2024-06-03

## 2024-06-03 RX ORDER — METHYLPREDNISOLONE ACETATE 40 MG/ML
40 INJECTION, SUSPENSION INTRA-ARTICULAR; INTRALESIONAL; INTRAMUSCULAR; SOFT TISSUE
Status: SHIPPED | OUTPATIENT
Start: 2024-06-03

## 2024-06-03 RX ADMIN — ROPIVACAINE HYDROCHLORIDE 4 ML: 5 INJECTION, SOLUTION EPIDURAL; INFILTRATION; PERINEURAL at 14:28

## 2024-06-03 RX ADMIN — METHYLPREDNISOLONE ACETATE 40 MG: 40 INJECTION, SUSPENSION INTRA-ARTICULAR; INTRALESIONAL; INTRAMUSCULAR; SOFT TISSUE at 14:28

## 2024-06-03 NOTE — PATIENT INSTRUCTIONS
1. Primary osteoarthritis of both knees    2. Class 3 severe obesity with serious comorbidity and body mass index (BMI) of 50.0 to 59.9 in adult, unspecified obesity type (H)      -Patient is following up for chronic bilateral knee pain due to arthritis  -Patient reports 5 to 6 weeks of relief with his recent viscosupplementation injection  -Patient reports also very short-term temporary relief from cortisone injections that he received at [facility] orth[facility]ics 6 months ago  -X-rays taken in office today of bilateral knees show moderate right and severe left knee arthritis  -We discussed neck step treatment options including genicular nerve block and ablation to help decrease the pain from his arthritis.  Ultimately, patient will need knee replacement surgery but he is far too young and his BMI is too high to safely undergo elective joint replacement surgery.  -Patient is seeing an orthopedist at [facility] who is planning on replacing his right hip later this year if he can lose an additional 30 to 40 pounds.  -Patient is not a surgical candidate in the near future for his knees.    -Patient has been losing 1 or 2 pounds a week for the past several months while taking weight loss medication.  Patient will continue working on a slow sustainable weight loss to improve pain and slow down progression of his arthritis  -An order was placed for genicular nerve block and ablation procedure with the spine department.  -Patient requested and tolerated bilateral knee intra-articular cortisone injections today without complications.  Patient was given postprocedure instructions  -Patient will follow-up when pain returns  -Call direct clinic number [[phone]] at any time with questions or concerns.    Albert Yeo MD [facility]  [facility] [place]

## 2024-06-03 NOTE — PROGRESS NOTES
ASSESSMENT & PLAN  Patient Instructions     1. Primary osteoarthritis of both knees    2. Class 3 severe obesity with serious comorbidity and body mass index (BMI) of 50.0 to 59.9 in adult, unspecified obesity type (H)      -Patient is following up for chronic bilateral knee pain due to arthritis  -Patient reports 5 to 6 weeks of relief with his recent viscosupplementation injection  -Patient reports also very short-term temporary relief from cortisone injections that he received at [facility] orthopedics 6 months ago  -X-rays taken in office today of bilateral knees show moderate right and severe left knee arthritis  -We discussed neck step treatment options including genicular nerve block and ablation to help decrease the pain from his arthritis.  Ultimately, patient will need knee replacement surgery but he is far too young and his BMI is too high to safely undergo elective joint replacement surgery.  -Patient is seeing an orthopedist at [facility] who is planning on replacing his right hip later this year if he can lose an additional 30 to 40 pounds.  -Patient is not a surgical candidate in the near future for his knees.    -Patient has been losing 1 or 2 pounds a week for the past several months while taking weight loss medication.  Patient will continue working on a slow sustainable weight loss to improve pain and slow down progression of his arthritis  -An order was placed for genicular nerve block and ablation procedure with the spine department.  -Patient requested and tolerated bilateral knee intra-articular cortisone injections today without complications.  Patient was given postprocedure instructions  -Patient will follow-up when pain returns  -Call direct clinic number [[phone]] at any time with questions or concerns.    Albert Yeo MD [facility] Orthopedics and Sports Medicine  [facility] [place]        -----    SUBJECTIVE:  Kimo Greenwood is a 43 year old male who is seen in follow-up  "for bilateral knee pain.They were last seen 4/3/2024.     Since their last visit reports 0% - (About the same as last time). They indicate that their current pain level is 8/10. They have tried rest/activity avoidance, heat, ice, Tylenol, ibuprofen, Aleve, viscosupplementation injection (most recent date: 04/17/2024) that provided about a month and a half of relief, and Cane for ambulation.      The patient is seen their care coordinator.    Patient's past medical, surgical, social, and family histories were reviewed today and no changes are noted.    REVIEW OF SYSTEMS:  Constitutional: NEGATIVE for fever, chills, change in weight  Skin: NEGATIVE for worrisome rashes, moles or lesions  GI/: NEGATIVE for bowel or bladder changes  Neuro: NEGATIVE for weakness, dizziness or paresthesias    OBJECTIVE:  /84   Ht 1.854 m (6' 1\")   Wt (!) 168.7 kg (372 lb)   BMI 49.08 kg/m     General: healthy, alert and in no distress  HEENT: no scleral icterus or conjunctival erythema  Skin: no suspicious lesions or rash. No jaundice.  CV: regular rhythm by palpation, no pedal edema  Resp: normal respiratory effort without conversational dyspnea   Psych: normal mood and affect  Gait: normal steady gait with appropriate coordination and balance  Neuro: normal light touch sensory exam of the extremities.    MSK:  BILATERAL KNEE  Inspection:  Genu valgum deformity  Palpation:    Tender about the lateral patellar facet, medial patellar facet, lateral joint line, and medial joint line. Remainder of bony and ligamentous landmarks are nontender.    Mild effusion is present    Patellofemoral crepitus is Absent  Range of Motion:     00 extension to 1100 flexion  Strength:    Quadriceps grossly intact    Extensor mechanism intact  Special Tests:    Positive: NONE    Negative: MCL/valgus stress (0 & 30 deg), LCL/varus stress (0 & 30 deg), Lachman's, anterior drawer, posterior drawer, Alina's    Independent visualization of the below " image:  Recent Results (from the past 24 hour(s))   XR Knee Bilateral 3 vw    Narrative    Right moderate lateral compartment joint space narrowing with osteophytes.    Left severe lateral compartment, mild medial and patellofemoral   compartment joint space narrowing with tricompartment osteophytosis.  Mild   genu valgus deformity of the left knee.  No acute fracture or dislocation.         Large Joint Injection/Arthocentesis: bilateral knee    Date/Time: 6/3/2024 2:28 PM    Performed by: Yeo, Albert, MD  Authorized by: Yeo, Albert, MD    Indications:  Pain and osteoarthritis  Needle Size:  22 G  Guidance: ultrasound    Approach:  Anterolateral  Location:  Knee  Laterality:  Bilateral      Medications (Right):  40 mg methylPREDNISolone 40 MG/ML; 4 mL ROPivacaine 5 MG/ML  Medications (Left):  40 mg methylPREDNISolone 40 MG/ML; 4 mL ROPivacaine 5 MG/ML  Outcome:  Tolerated well, no immediate complications  Procedure discussed: discussed risks, benefits, and alternatives    Consent Given by:  Patient  Timeout: timeout called immediately prior to procedure    Prep: patient was prepped and draped in usual sterile fashion     Ultrasound was used to ensure safe and accurate needle placement and injection. Ultrasound images of the procedure were permanently stored.    Consent was obtained over the phone with patient's mother Dalia who is legal guardian and makes medical decisions on behalf of the patient.    Patient's conditions were thoroughly discussed during today's visit with total time spent face-to-face with the patient and documentation being 35 minutes excluding the time for the procedure.    Albert Yeo MD, [facility]

## 2024-06-03 NOTE — LETTER
6/3/2024         RE: Kimo Greenwood  [address]  [place] [address]        Dear Colleague,    Thank you for referring your patient, Kimo Greenwood, to the [facility] [place]. Please see a copy of my visit note below.    ASSESSMENT & PLAN  Patient Instructions     1. Primary osteoarthritis of both knees    2. Class 3 severe obesity with serious comorbidity and body mass index (BMI) of 50.0 to 59.9 in adult, unspecified obesity type (H)      -Patient is following up for chronic bilateral knee pain due to arthritis  -Patient reports 5 to 6 weeks of relief with his recent viscosupplementation injection  -Patient reports also very short-term temporary relief from cortisone injections that he received at [facility] orthopedics 6 months ago  -X-rays taken in office today of bilateral knees show moderate right and severe left knee arthritis  -We discussed neck step treatment options including genicular nerve block and ablation to help decrease the pain from his arthritis.  Ultimately, patient will need knee replacement surgery but he is far too young and his BMI is too high to safely undergo elective joint replacement surgery.  -Patient is seeing an orthopedist at [facility] who is planning on replacing his right hip later this year if he can lose an additional 30 to 40 pounds.  -Patient is not a surgical candidate in the near future for his knees.    -Patient has been losing 1 or 2 pounds a week for the past several months while taking weight loss medication.  Patient will continue working on a slow sustainable weight loss to improve pain and slow down progression of his arthritis  -An order was placed for genicular nerve block and ablation procedure with the spine department.  -Patient requested and tolerated bilateral knee intra-articular cortisone injections today without complications.  Patient was given postprocedure instructions  -Patient will follow-up when  "pain returns  -Call direct clinic number [[phone]] at any time with questions or concerns.    Albert Yeo MD [facility] Orthopedics and Sports Medicine  [facility]        -----    SUBJECTIVE:  Kimo Greenwood is a 43 year old male who is seen in follow-up for bilateral knee pain.They were last seen 4/3/2024.     Since their last visit reports 0% - (About the same as last time). They indicate that their current pain level is 8/10. They have tried rest/activity avoidance, heat, ice, Tylenol, ibuprofen, Aleve, viscosupplementation injection (most recent date: 04/17/2024) that provided about a month and a half of relief, and Cane for ambulation.      The patient is seen their care coordinator.    Patient's past medical, surgical, social, and family histories were reviewed today and no changes are noted.    REVIEW OF SYSTEMS:  Constitutional: NEGATIVE for fever, chills, change in weight  Skin: NEGATIVE for worrisome rashes, moles or lesions  GI/: NEGATIVE for bowel or bladder changes  Neuro: NEGATIVE for weakness, dizziness or paresthesias    OBJECTIVE:  /84   Ht 1.854 m (6' 1\")   Wt (!) 168.7 kg (372 lb)   BMI 49.08 kg/m     General: healthy, alert and in no distress  HEENT: no scleral icterus or conjunctival erythema  Skin: no suspicious lesions or rash. No jaundice.  CV: regular rhythm by palpation, no pedal edema  Resp: normal respiratory effort without conversational dyspnea   Psych: normal mood and affect  Gait: normal steady gait with appropriate coordination and balance  Neuro: normal light touch sensory exam of the extremities.    MSK:  BILATERAL KNEE  Inspection:  Genu valgum deformity  Palpation:    Tender about the lateral patellar facet, medial patellar facet, lateral joint line, and medial joint line. Remainder of bony and ligamentous landmarks are nontender.    Mild effusion is present    Patellofemoral crepitus is Absent  Range of Motion:     00 extension to 1100 " flexion  Strength:    Quadriceps grossly intact    Extensor mechanism intact  Special Tests:    Positive: NONE    Negative: MCL/valgus stress (0 & 30 deg), LCL/varus stress (0 & 30 deg), Lachman's, anterior drawer, posterior drawer, Alina's    Independent visualization of the below image:  Recent Results (from the past 24 hour(s))   XR Knee Bilateral 3 vw    Narrative    Right moderate lateral compartment joint space narrowing with osteophytes.    Left severe lateral compartment, mild medial and patellofemoral   compartment joint space narrowing with tricompartment osteophytosis.  Mild   genu valgus deformity of the left knee.  No acute fracture or dislocation.         Large Joint Injection/Arthocentesis: bilateral knee    Date/Time: 6/3/2024 2:28 PM    Performed by: Yeo, Albert, MD  Authorized by: Yeo, Albert, MD    Indications:  Pain and osteoarthritis  Needle Size:  22 G  Guidance: ultrasound    Approach:  Anterolateral  Location:  Knee  Laterality:  Bilateral      Medications (Right):  40 mg methylPREDNISolone 40 MG/ML; 4 mL ROPivacaine 5 MG/ML  Medications (Left):  40 mg methylPREDNISolone 40 MG/ML; 4 mL ROPivacaine 5 MG/ML  Outcome:  Tolerated well, no immediate complications  Procedure discussed: discussed risks, benefits, and alternatives    Consent Given by:  Patient  Timeout: timeout called immediately prior to procedure    Prep: patient was prepped and draped in usual sterile fashion     Ultrasound was used to ensure safe and accurate needle placement and injection. Ultrasound images of the procedure were permanently stored.    Consent was obtained over the phone with patient's mother Dalia who is legal guardian and makes medical decisions on behalf of the patient.    Patient's conditions were thoroughly discussed during today's visit with total time spent face-to-face with the patient and documentation being 35 minutes excluding the time for the procedure.    Albert Yeo MD, [facility] [employer]  and Orthopedic Care      Again, thank you for allowing me to participate in the care of your patient.        Sincerely,        Albert Yeo, MD

## 2024-06-04 ENCOUNTER — TELEPHONE (OUTPATIENT)
Dept: PALLIATIVE MEDICINE | Facility: CLINIC | Age: 44
End: 2024-06-04
Payer: COMMERCIAL

## 2024-06-04 DIAGNOSIS — G89.29 BILATERAL CHRONIC KNEE PAIN: Primary | ICD-10-CM

## 2024-06-04 DIAGNOSIS — M25.562 BILATERAL CHRONIC KNEE PAIN: Primary | ICD-10-CM

## 2024-06-04 DIAGNOSIS — M25.561 BILATERAL CHRONIC KNEE PAIN: Primary | ICD-10-CM

## 2024-06-04 NOTE — TELEPHONE ENCOUNTER
"Screening Questions for Radiology Injections:    Injection to be done at which interventional clinic site? [facility]    If choosing [facility] for location, please inform patient:  \"[facility] is a Hospital based clinic. Before your visit, you should check with your insurance about how it covers the charges for facility services in a hospital-based clinic.     Procedure ordered by     Yeo, Albert, MD in [facility] SPORTS MED       Procedure ordered? bilateral genicular nerve block and ablation with Dr. Leonid Beasley   Transforaminal Cervical MARYJANE - Send to [facility]) - No [facility] providers perform this procedure    What insurance would patient like us to bill for this procedure? MEDICA   IF SCHEDULING IN [place] PAIN OR SPINE PLEASE SCHEDULE AT LEAST 7-10 BUSINESS DAYS OUT SO A PA CAN BE OBTAINED  Worker's comp or MVA (motor vehicle accident) -Any injection DO NOT SCHEDULE and route to Tomasa Jeffery.    [employer] insurance - For SI joint injections, DO NOT SCHEDULE and route to Erin Hammer.     ALL BCBS, [employer] and [employer] - DO NOT SCHEDULE and route to Erin Harman  MEDICA- ALL INJECTIONS- route to Erin [place]    Is patient scheduled at [facility]? NO    If YES, route every encounter to [facility] SPINE CENTER CARE NAVIGATION POOL [[phone]]    Is an  needed? No     Patient has a  home? (Review Grid) YES: Informed     Any chance of pregnancy? Not Applicable   If YES, do NOT schedule and route to RN pool  - Dr. Antony route to Glo Cox and PM&R Nurse  [[id number]]      Is patient actively being treated for cancer or immunocompromised? No  If YES, do NOT schedule and route to RN pool/ Dr. Antony's Team    Does the patient have a bleeding or clotting disorder? No   If YES, okay to schedule AND route to RN nurse / Dr. Antony's Team   (For any patients with platelet count <100, RN must forward to provider)    Is patient taking any Blood Thinners " OR Antiplatelet medication?  No   If hold needed, do NOT schedule, route to RN pool/ Dr. Silverios Team  Examples:   Blood Thinners: (Coumadin, Warfarin, Jantoven, Pradaxa, Xarelto, Eliquis, Edoxaban, Enoxaparin, Lovenox, Heparin, Arixtra, Fondaparinux or Fragmin)  Antiplatelet Medications: (Plavix, Brilinta or Effient)     Is patient taking any aspirin products (includes Excedrin and Fiorinal)? No   If yes route to RN pool/ Dr. Antony's Team - Do not schedule      Any allergies to contrast dye, iodine, shellfish, or numbing and steroid medications? No  If YES, schedule and add allergy information to appointment notes AND route to the RN pool/ Dr. Antony's Team  If MARYJANE and Contrast Dye / Iodine Allergy? DO NOT SCHEDULE, route to RN pool/ Dr. Perla Team  Allergies: Other [no clinical screening - see comments]     Does patient have an active infection or treated for one within the past week? No  Is patient currently taking any antibiotics or steroid medications?  No   For patients on chronic, preventative, or prophylactic antibiotics, procedures may be scheduled.   For patients on antibiotics for active or recent infection, schedule 4 days after completed.  For patients on steroid medications, schedule 4 days after completed.     Has the patient had a flu shot or any other vaccinations within the past 7 days? No  If yes, explain that for the vaccine to work best they need to:     wait 1 week before and 1 week after getting any Vaccine  wait 1 week before and 2 weeks after getting any Covid Vaccine   If patient has concerns about the timing, send to RN pool/ Dr. Silverios Team    Does patient have an MRI/CT?  Not Applicable Include Date and Check Procedure Scheduling Grid to see if required.  Was the MRI/CT done within the last 3 years?  NA   If no route to RN Pool/ Dr. Silverios Team  If yes, where was the MRI/CT done?    Refer to PACS Transmissions list for approved external locations and route to RN Pool High  Priority/ Dr. Antony's Team  If MRI was not done at approved external location do NOT schedule and route to RN pool/ Dr. Antony's Team    If patient has an imaging disc, the injection MAY be scheduled but patient must bring disc to appt or appt will be cancelled.    Is patient able to transfer to a procedure table with minimal or no assistance? Yes   If no, do NOT schedule and route to RN Pool/ Dr. Antony's Team    Procedure Specific Instructions:  If celiac plexus block, informed patient NPO for 6 hours and that it is okay to take medications with sips of water, especially blood pressure medications Not Applicable       If this is for a cervical procedure, informed patient that aspirin needs to be held for 6 days.   Not Applicable    Sedation, If Sedation is ordered for any procedure, patient must be NPO for 6 hours prior to procedure Not Applicable    If IV needed:  Do not schedule procedures requiring IV placement in the first appointment of the day or first appointment after lunch. Do NOT schedule at 0745, 0815 or 1245.     Instructed patient to arrive 30 minutes early for IV start if required. (Check Procedure Scheduling Grid)  Not Applicable    Reminders:  If you are started on any steroids or antibiotics between now and your appointment, you must contact us because the procedure may need to be cancelled.  Yes    As a reminder, receiving steroids can decrease your body's ability to fight infection.   Would you still like to move forward with scheduling the injection?  Yes    IV Sedation is not provided for procedures. If oral anti-anxiety medication is needed, the patient should request this from their referring provider.    Instruct patient to arrive as directed prior to the scheduled appointment time:  If IV needed 30 minutes before appointment time     For patients 85 or older we recommend having an adult stay w/ them for the remainder of the day.     If the patient is Diabetic, remind them to bring their  glucometer.      Does the patient have any questions?  NO  Saida Perez  [facility]

## 2024-06-07 NOTE — TELEPHONE ENCOUNTER
Per Jovanni- there is no PA for genicular ablation. Also, procedure is not listed on [employer]'s PA list.      Okay to schedule, no PA required.     **Please inform patient that he may want to check if this procedure is covered under his plan as sometimes this is considered investigational**        Patient will need these to call insurance:    CPT- ablation 61158    CPT- block- 94353    Diagnosis code- M17.0          Erin BRIGGS  Complex   [facility]

## 2024-06-11 ENCOUNTER — HOSPITAL ENCOUNTER (OUTPATIENT)
Dept: CT IMAGING | Facility: CLINIC | Age: 44
Discharge: HOME | End: 2024-06-11
Attending: INTERNAL MEDICINE | Admitting: INTERNAL MEDICINE
Payer: COMMERCIAL

## 2024-06-11 ENCOUNTER — LAB (OUTPATIENT)
Dept: INFUSION THERAPY | Facility: CLINIC | Age: 44
End: 2024-06-11
Attending: INTERNAL MEDICINE
Payer: COMMERCIAL

## 2024-06-11 DIAGNOSIS — K76.9 LIVER LESION: ICD-10-CM

## 2024-06-11 DIAGNOSIS — Z13.9 SCREENING FOR CONDITION: ICD-10-CM

## 2024-06-11 DIAGNOSIS — D72.828 NEUTROPHILIA: ICD-10-CM

## 2024-06-11 LAB
BASOPHILS # BLD AUTO: 0 10E3/UL (ref 0–0.2)
BASOPHILS NFR BLD AUTO: 0 %
EOSINOPHIL # BLD AUTO: 0.3 10E3/UL (ref 0–0.7)
EOSINOPHIL NFR BLD AUTO: 3 %
ERYTHROCYTE [DISTWIDTH] IN BLOOD BY AUTOMATED COUNT: 13.9 % (ref 10–15)
HCT VFR BLD AUTO: 49.1 % (ref 40–53)
HGB BLD-MCNC: 16.3 G/DL (ref 13.3–17.7)
IMM GRANULOCYTES # BLD: 0 10E3/UL
IMM GRANULOCYTES NFR BLD: 0 %
LYMPHOCYTES # BLD AUTO: 2 10E3/UL (ref 0.8–5.3)
LYMPHOCYTES NFR BLD AUTO: 22 %
MCH RBC QN AUTO: 28.5 PG (ref 26.5–33)
MCHC RBC AUTO-ENTMCNC: 33.2 G/DL (ref 31.5–36.5)
MCV RBC AUTO: 86 FL (ref 78–100)
MONOCYTES # BLD AUTO: 0.4 10E3/UL (ref 0–1.3)
MONOCYTES NFR BLD AUTO: 4 %
NEUTROPHILS # BLD AUTO: 6.5 10E3/UL (ref 1.6–8.3)
NEUTROPHILS NFR BLD AUTO: 71 %
NRBC # BLD AUTO: 0 10E3/UL
NRBC BLD AUTO-RTO: 0 /100
PLATELET # BLD AUTO: 154 10E3/UL (ref 150–450)
RBC # BLD AUTO: 5.72 10E6/UL (ref 4.4–5.9)
WBC # BLD AUTO: 9.3 10E3/UL (ref 4–11)

## 2024-06-11 PROCEDURE — 80061 LIPID PANEL: CPT | Performed by: FAMILY MEDICINE

## 2024-06-11 PROCEDURE — 74178 CT ABD&PLV WO CNTR FLWD CNTR: CPT

## 2024-06-11 PROCEDURE — 85025 COMPLETE CBC W/AUTO DIFF WBC: CPT | Performed by: INTERNAL MEDICINE

## 2024-06-11 PROCEDURE — 250N000009 HC RX 250: Performed by: INTERNAL MEDICINE

## 2024-06-11 PROCEDURE — 36415 COLL VENOUS BLD VENIPUNCTURE: CPT

## 2024-06-11 PROCEDURE — 250N000011 HC RX IP 250 OP 636: Performed by: INTERNAL MEDICINE

## 2024-06-11 RX ORDER — IOPAMIDOL 755 MG/ML
500 INJECTION, SOLUTION INTRAVASCULAR ONCE
Status: COMPLETED | OUTPATIENT
Start: 2024-06-11 | End: 2024-06-11

## 2024-06-11 RX ADMIN — SODIUM CHLORIDE 63 ML: 9 INJECTION, SOLUTION INTRAVENOUS at 15:34

## 2024-06-11 RX ADMIN — IOPAMIDOL 100 ML: 755 INJECTION, SOLUTION INTRAVENOUS at 15:34

## 2024-06-11 NOTE — PROGRESS NOTES
Nursing Note:  Kimo Greenwood presents today for PIV and labs.    Patient seen by provider today: No   present during visit today: Not Applicable.    Note: PIV placed with US.    Intravenous Access:  Labs drawn without difficulty.  Peripheral IV placed.    Discharge Plan:   Patient was sent to imaging for CT appointment.    Breanne Dudley RN

## 2024-06-12 LAB
CHOLEST SERPL-MCNC: 159 MG/DL
FASTING STATUS PATIENT QL REPORTED: NO
HDLC SERPL-MCNC: 43 MG/DL
LDLC SERPL CALC-MCNC: 102 MG/DL
NONHDLC SERPL-MCNC: 116 MG/DL
TRIGL SERPL-MCNC: 69 MG/DL

## 2024-06-13 NOTE — TELEPHONE ENCOUNTER
Routing to provider to review, Are you planning on a bilateral genicular block for this appointment vs  into 2 appointments.     Chelsi AMADO, RN Care Coordinator  [facility]  Pain [facility]

## 2024-06-14 NOTE — TELEPHONE ENCOUNTER
Phone call to patient to discuss treatment plan and insurance aspect. Spoke with Jose Mckeon, patient's care coordinator as the cell number listed is hers. Signed consent to communicate in chart.    Explained the genicular blocks would be done to each knee at one appointment, but the ablations would be done in 2 separate appointments. Did explain that patient's insurance should be called as this is sometimes considered investigational. CPT and diagnosis codes given. Also explained if procedures not covered by insurance, he would have to pay out of pocket. Cost of care Estimates number given for her to call as well.    Also gave Jose the direct number to procedure  as patient recently had cortisone injections to his knees. May need to reschedule the blocks if still getting relief from the steroid injections.

## 2024-06-18 ENCOUNTER — TELEPHONE (OUTPATIENT)
Dept: ENDOCRINOLOGY | Facility: CLINIC | Age: 44
End: 2024-06-18

## 2024-06-18 ENCOUNTER — VIRTUAL VISIT (OUTPATIENT)
Dept: ONCOLOGY | Facility: CLINIC | Age: 44
End: 2024-06-18
Attending: INTERNAL MEDICINE
Payer: COMMERCIAL

## 2024-06-18 VITALS — HEIGHT: 72 IN | BODY MASS INDEX: 42.66 KG/M2 | WEIGHT: 315 LBS

## 2024-06-18 DIAGNOSIS — K76.9 LIVER LESION: ICD-10-CM

## 2024-06-18 DIAGNOSIS — D72.828 NEUTROPHILIA: Primary | ICD-10-CM

## 2024-06-18 PROCEDURE — 99214 OFFICE O/P EST MOD 30 MIN: CPT | Mod: 95 | Performed by: PHYSICIAN ASSISTANT

## 2024-06-18 ASSESSMENT — PAIN SCALES - GENERAL: PAINLEVEL: NO PAIN (0)

## 2024-06-18 NOTE — TELEPHONE ENCOUNTER
Left Voicemail (1st Attempt) and Sent Mychart (1st Attempt) for the patient to call back and schedule the following:    Appointment type: GITA ZEPEDA   Provider: Nya Lara PA-C  Return date: 7/16/2024  Specialty phone number: [phone]  Additional appointment(s) needed: n/a  Additonal Notes: Pt needs to reschedule, offer Luz, Miri or Chloe and wait list, or MK in Oct

## 2024-06-18 NOTE — LETTER
6/18/2024      Kimo Greenwood  [address]  [facility] [address]      Dear Colleague,    Thank you for referring your patient, Kimo Greenwood, to the [facility]. Please see a copy of my visit note below.    Virtual Visit Details    Type of service:  Video Visit   Video Start Time: 10:11 AM  Video End Time:10:20 AM    Originating Location (pt. Location): Home    Distant Location (provider location):  On-site  Platform used for Video Visit: [facility]    Oncology/Hematology Visit Note    Jun 18, 2024    Reason for visit: Follow up neutrophilia    Oncology HPI: Kimo Greenwood is a 43 year old male with neutrophilia.  He has had ongoing leukocytosis since 2012, specifically neutrophilia.  ANC gillian of 9.1 in 2017.  JAK2/MPL/CALR negative.  He has been following Dr. Amato.    Also, he was hospitalized for pneumonia in early 2023 and noted to have an incidental 8.7 cm lipomatous mass in the retroperitoneum.  Biopsy in March 2022 lipomatous tumor/liposarcoma, negative for MDM2.    Of note, he has liver lesions and followed by liver MRI annually.    He is here today for annual follow-up and liver MRI.    Interval History: Leo is over video today and he is doing well.  He is trying to lose weight in preparation for possible hip surgery in the future.  He has been told that he should try to lose 20-25 more pounds.  He admits that he does drink a lot of sugary sodas.  He denies any fever or chills, vomiting or diarrhea, bleeding or stool changes.    Review of Systems: See interval hx. Denies fevers, chills, HA, changes in vision, abdominal pain, N/V, diarrhea, changes in urination, bleeding, bruising.     PMHx and Social Hx reviewed per EPIC.      Medications:  Current Outpatient Medications   Medication Sig Dispense Refill     albuterol (VENTOLIN HFA) 108 (90 Base) MCG/ACT inhaler INHALE 2 PUFFS INTO LUNGS EVERY SIX HOURS AS NEEDED FOR SHORTNESS OF BREATH / DYSPNEA OR  WHEEZING 18 g 1     calcium polycarbophil (FIBER-LAX) 625 MG tablet Take 2 tablets (1,250 mg) by mouth 2 times daily 360 tablet 0     celecoxib (CELEBREX) 100 MG capsule Take 1 capsule (100 mg) by mouth 2 times daily 60 capsule 1     DULoxetine (CYMBALTA) 60 MG capsule Take 120 mg by mouth daily        ferrous sulfate (FEROSUL) 325 (65 Fe) MG tablet Take 1 tablet (325 mg) by mouth daily (with breakfast) 30 tablet 2     hydrochlorothiazide (HYDRODIURIL) 25 MG tablet Take 1 tablet (25 mg) by mouth daily 30 tablet 7     lisinopril (ZESTRIL) 10 MG tablet Take 1 tablet (10 mg) by mouth every morning 30 tablet 7     mirabegron (MYRBETRIQ) 50 MG 24 hr tablet Take 1 tablet by mouth daily       nicotine (NICORETTE) 2 MG gum Place 1 each (2 mg) inside cheek every hour as needed for nicotine withdrawal symptoms 50 each 1     nystatin-triamcinolone (MYCOLOG II) 034541-2.1 UNIT/GM-% external cream Apply topically 2 times daily 60 g 0     OLANZapine-samidorphan (LYBALVI) 10-10 MG TABS tablet Take 1 tablet by mouth At Bedtime       order for DME Equipment being ordered: CPAP supplies 1 each 0     oxyBUTYnin ER (DITROPAN XL) 15 MG 24 hr tablet Take 2 tablets (30 mg) by mouth daily 60 tablet 1     pantoprazole (PROTONIX) 40 MG EC tablet TAKE 1 TABLET BY MOUTH ONCE DAILY 30-60 MINUTES BR FIRST MEAL OF THE DAY 30 tablet 7     prazosin (MINIPRESS) 1 MG capsule Take 3 mg by mouth At Bedtime       tirzepatide-Weight Management (ZEPBOUND) 10 MG/0.5ML prefilled pen Inject 0.5 mLs (10 mg) Subcutaneous every 7 days 2 mL 3     tirzepatide-Weight Management (ZEPBOUND) 12.5 MG/0.5ML prefilled pen Inject 0.5 mLs (12.5 mg) Subcutaneous every 7 days 2 mL 0       Allergies   Allergen Reactions     Other [No Clinical Screening - See Comments] Other (See Comments)     Awoke from anesthesia with anger and ripping off dressing, after interstim placement, outside hospital 2013       EXAM:    Ht 1.829 m (6')   Wt (!) 165.6 kg (365 lb)   BMI 49.50 kg/m    Video, no full VS.     GENERAL:  Male, in no acute distress.  Alert and oriented x3. Well groomed.   HEENT:  Normocephalic, atraumatic. No conjunctival injection or eye swelling.   LUNGS:  Nonlabored breathing, no cough or audible wheezing, able to speak full sentences.  MSK: Full ROM UE.    SKIN: No rash on exposed skin.   NEURO: CN grossly intact, speech normal  PSYCH: Mentation appears normal, insight and judgement intact      Labs:    06/11/24 15:07   WBC 9.3   Hemoglobin 16.3   Hematocrit 49.1   Platelet Count 154   RBC Count 5.72   MCV 86   MCH 28.5   MCHC 33.2   RDW 13.9   % Neutrophils 71   % Lymphocytes 22   % Monocytes 4   % Eosinophils 3   % Basophils 0   Absolute Basophils 0.0   Absolute Eosinophils 0.3   Absolute Immature Granulocytes 0.0   Absolute Lymphocytes 2.0   Absolute Monocytes 0.4   % Immature Granulocytes 0   Absolute Neutrophils 6.5   Absolute NRBCs 0.0   NRBCs per 100 WBC 0     Imaging:   CT LIVER WITHOUT AND WITH CONTRAST June 11, 2024 3:56 PM      HISTORY: Liver lesion.     COMPARISON: CT abdomen and pelvis 6/27/2023, 11/17/2022.     TECHNIQUE: Axial CT images acquired through the abdomen after  administration of 100mL Isovue-370 during arterial and portal venous  phases through the liver. Radiation dose for this scan was reduced  using automated exposure control, adjustment of the mA and/or kV  according to patient size, or iterative reconstruction technique.  Sagittal and coronal reconstructions performed.     FINDINGS:   LOWER CHEST: Trace bilateral pleural effusions.     HEPATOBILIARY: Since 11/17/2022, similar in size and morphology of a  segment 7 hypoenhancing observation measuring 2.7 cm, previously 2.7  cm when measured similarly. No new suspicious liver lesion.  Cholelithiasis.      PANCREAS: No significant mass, duct dilatation, or inflammatory  change.     SPLEEN: Unremarkable.     ADRENAL GLANDS: Similar in size left adrenal 10.4 cm myelolipoma.     KIDNEYS: No significant  mass, stones, or hydronephrosis.     BOWEL: No obstruction or inflammatory change.     VASCULATURE: Nonaneurysmal abdominal aorta.     LYMPH NODE AND PERITONEUM: No enlarged lymph node. No free fluid.     MUSCULOSKELETAL: Small fat-containing periumbilical hernia. No  aggressive osseous lesion. Ankylosis of the thoracolumbar spine.     OTHER: None.                                                                      IMPRESSION:   1. Since 11/17/2022, similar segment 7 hepatic observation. No new  liver lesion.  2. Trace bilateral pleural effusions.    Impression/Plan: Kimo Greenwood is a 43 year old male with:    Chronic anemia and intermittent neutrophilia  -Patient has evidence of chronic, intermittent neutrophilia and NC/NC anemia.  -Asymptomatic.  -Repeat CBC in July 2022 with persistent neutrophilia and NC/NC anemia. Negative for dysplasia or evidence of blasts.   -Iron studies, B12, folate, TSH WNL in July 2022.  -FLOW cytometry normal 7/2022.  -CT CAP is showing normal spleen size.   -Over the last year, WBC has ranged 9-18 with ANC  7-15. Currently, WBC is 9.3, ANC 6.5. Normal Hb and platelets.  -JAK2/MPL/CALR negative.   -BCR-ABL p190 negative; p210 negative.  -Hold on bone marrow biopsy at this time.  -DR Amato has left this practice and plan to repeat CBC with new MD in 1 year.     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  -9/2022 scans show stability.  -11/2022 scans show stability.  -6/2023 scans show stability.  -Followed by Dr. Ojeda.     Liver lesions  -CT 11/17/22 did not visualize the previous lesion at segment 9; he still has a stable nodular mass of the posterior right liver.  -Repeat imaging 6/2024 unable to visualize.  -CT liver ordered in 1 year      Chart documentation with Dragon Voice recognition Software. Although reviewed after completion, some words and grammatical errors may remain.    30 minutes  spent on the date of the encounter doing chart review, review of test results, interpretation of tests, patient visit, and documentation     Stephanie Rivera PA-C  Hematology/Oncology  [facility]                  Again, thank you for allowing me to participate in the care of your patient.        Sincerely,        Stephanie Rivera PA-C

## 2024-06-18 NOTE — LETTER
6/18/2024      Kimo Greenwood  [address]  [facility] [address]      Dear Colleague,    Thank you for referring your patient, Kimo Greenwood, to the [facility]. Please see a copy of my visit note below.    Virtual Visit Details    Type of service:  Video Visit   Video Start Time: 10:11 AM  Video End Time:10:20 AM    Originating Location (pt. Location): Home    Distant Location (provider location):  On-site  Platform used for Video Visit: [facility]    Oncology/Hematology Visit Note    Jun 18, 2024    Reason for visit: Follow up neutrophilia    Oncology HPI: Kimo Greenwood is a 43 year old male with neutrophilia.  He has had ongoing leukocytosis since 2012, specifically neutrophilia.  ANC gillian of 9.1 in 2017.  JAK2/MPL/CALR negative.  He has been following Dr. Amato.    Also, he was hospitalized for pneumonia in early 2023 and noted to have an incidental 8.7 cm lipomatous mass in the retroperitoneum.  Biopsy in March 2022 lipomatous tumor/liposarcoma, negative for MDM2.    Of note, he has liver lesions and followed by liver MRI annually.    He is here today for annual follow-up and liver MRI.    Interval History: Leo is over video today and he is doing well.  He is trying to lose weight in preparation for possible hip surgery in the future.  He has been told that he should try to lose 20-25 more pounds.  He admits that he does drink a lot of sugary sodas.  He denies any fever or chills, vomiting or diarrhea, bleeding or stool changes.    Review of Systems: See interval hx. Denies fevers, chills, HA, changes in vision, abdominal pain, N/V, diarrhea, changes in urination, bleeding, bruising.     PMHx and Social Hx reviewed per EPIC.      Medications:  Current Outpatient Medications   Medication Sig Dispense Refill     albuterol (VENTOLIN HFA) 108 (90 Base) MCG/ACT inhaler INHALE 2 PUFFS INTO LUNGS EVERY SIX HOURS AS NEEDED FOR SHORTNESS OF BREATH / DYSPNEA OR  WHEEZING 18 g 1     calcium polycarbophil (FIBER-LAX) 625 MG tablet Take 2 tablets (1,250 mg) by mouth 2 times daily 360 tablet 0     celecoxib (CELEBREX) 100 MG capsule Take 1 capsule (100 mg) by mouth 2 times daily 60 capsule 1     DULoxetine (CYMBALTA) 60 MG capsule Take 120 mg by mouth daily        ferrous sulfate (FEROSUL) 325 (65 Fe) MG tablet Take 1 tablet (325 mg) by mouth daily (with breakfast) 30 tablet 2     hydrochlorothiazide (HYDRODIURIL) 25 MG tablet Take 1 tablet (25 mg) by mouth daily 30 tablet 7     lisinopril (ZESTRIL) 10 MG tablet Take 1 tablet (10 mg) by mouth every morning 30 tablet 7     mirabegron (MYRBETRIQ) 50 MG 24 hr tablet Take 1 tablet by mouth daily       nicotine (NICORETTE) 2 MG gum Place 1 each (2 mg) inside cheek every hour as needed for nicotine withdrawal symptoms 50 each 1     nystatin-triamcinolone (MYCOLOG II) 634338-4.1 UNIT/GM-% external cream Apply topically 2 times daily 60 g 0     OLANZapine-samidorphan (LYBALVI) 10-10 MG TABS tablet Take 1 tablet by mouth At Bedtime       order for DME Equipment being ordered: CPAP supplies 1 each 0     oxyBUTYnin ER (DITROPAN XL) 15 MG 24 hr tablet Take 2 tablets (30 mg) by mouth daily 60 tablet 1     pantoprazole (PROTONIX) 40 MG EC tablet TAKE 1 TABLET BY MOUTH ONCE DAILY 30-60 MINUTES BR FIRST MEAL OF THE DAY 30 tablet 7     prazosin (MINIPRESS) 1 MG capsule Take 3 mg by mouth At Bedtime       tirzepatide-Weight Management (ZEPBOUND) 10 MG/0.5ML prefilled pen Inject 0.5 mLs (10 mg) Subcutaneous every 7 days 2 mL 3     tirzepatide-Weight Management (ZEPBOUND) 12.5 MG/0.5ML prefilled pen Inject 0.5 mLs (12.5 mg) Subcutaneous every 7 days 2 mL 0       Allergies   Allergen Reactions     Other [No Clinical Screening - See Comments] Other (See Comments)     Awoke from anesthesia with anger and ripping off dressing, after interstim placement, outside hospital 2013       EXAM:    Ht 1.829 m (6')   Wt (!) 165.6 kg (365 lb)   BMI 49.50 kg/m    Video, no full VS.     GENERAL:  Male, in no acute distress.  Alert and oriented x3. Well groomed.   HEENT:  Normocephalic, atraumatic. No conjunctival injection or eye swelling.   LUNGS:  Nonlabored breathing, no cough or audible wheezing, able to speak full sentences.  MSK: Full ROM UE.    SKIN: No rash on exposed skin.   NEURO: CN grossly intact, speech normal  PSYCH: Mentation appears normal, insight and judgement intact      Labs:    06/11/24 15:07   WBC 9.3   Hemoglobin 16.3   Hematocrit 49.1   Platelet Count 154   RBC Count 5.72   MCV 86   MCH 28.5   MCHC 33.2   RDW 13.9   % Neutrophils 71   % Lymphocytes 22   % Monocytes 4   % Eosinophils 3   % Basophils 0   Absolute Basophils 0.0   Absolute Eosinophils 0.3   Absolute Immature Granulocytes 0.0   Absolute Lymphocytes 2.0   Absolute Monocytes 0.4   % Immature Granulocytes 0   Absolute Neutrophils 6.5   Absolute NRBCs 0.0   NRBCs per 100 WBC 0     Imaging:   CT LIVER WITHOUT AND WITH CONTRAST June 11, 2024 3:56 PM      HISTORY: Liver lesion.     COMPARISON: CT abdomen and pelvis 6/27/2023, 11/17/2022.     TECHNIQUE: Axial CT images acquired through the abdomen after  administration of 100mL Isovue-370 during arterial and portal venous  phases through the liver. Radiation dose for this scan was reduced  using automated exposure control, adjustment of the mA and/or kV  according to patient size, or iterative reconstruction technique.  Sagittal and coronal reconstructions performed.     FINDINGS:   LOWER CHEST: Trace bilateral pleural effusions.     HEPATOBILIARY: Since 11/17/2022, similar in size and morphology of a  segment 7 hypoenhancing observation measuring 2.7 cm, previously 2.7  cm when measured similarly. No new suspicious liver lesion.  Cholelithiasis.      PANCREAS: No significant mass, duct dilatation, or inflammatory  change.     SPLEEN: Unremarkable.     ADRENAL GLANDS: Similar in size left adrenal 10.4 cm myelolipoma.     KIDNEYS: No significant  mass, stones, or hydronephrosis.     BOWEL: No obstruction or inflammatory change.     VASCULATURE: Nonaneurysmal abdominal aorta.     LYMPH NODE AND PERITONEUM: No enlarged lymph node. No free fluid.     MUSCULOSKELETAL: Small fat-containing periumbilical hernia. No  aggressive osseous lesion. Ankylosis of the thoracolumbar spine.     OTHER: None.                                                                      IMPRESSION:   1. Since 11/17/2022, similar segment 7 hepatic observation. No new  liver lesion.  2. Trace bilateral pleural effusions.    Impression/Plan: Kimo Greenwood is a 43 year old male with:    Chronic anemia and intermittent neutrophilia  -Patient has evidence of chronic, intermittent neutrophilia and NC/NC anemia.  -Asymptomatic.  -Repeat CBC in July 2022 with persistent neutrophilia and NC/NC anemia. Negative for dysplasia or evidence of blasts.   -Iron studies, B12, folate, TSH WNL in July 2022.  -FLOW cytometry normal 7/2022.  -CT CAP is showing normal spleen size.   -Over the last year, WBC has ranged 9-18 with ANC  7-15. Currently, WBC is 9.3, ANC 6.5. Normal Hb and platelets.  -JAK2/MPL/CALR negative.   -BCR-ABL p190 negative; p210 negative.  -Hold on bone marrow biopsy at this time.  -DR Amato has left this practice and plan to repeat CBC with new MD in 1 year.     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  -9/2022 scans show stability.  -11/2022 scans show stability.  -6/2023 scans show stability.  -Followed by Dr. Ojeda.     Liver lesions  -CT 11/17/22 did not visualize the previous lesion at segment 9; he still has a stable nodular mass of the posterior right liver.  -Repeat imaging 6/2024 unable to visualize.  -CT liver ordered in 1 year      Chart documentation with Dragon Voice recognition Software. Although reviewed after completion, some words and grammatical errors may remain.    30 minutes  spent on the date of the encounter doing chart review, review of test results, interpretation of tests, patient visit, and documentation     Stephanie Rivera PA-C  Hematology/Oncology  [facility]                  Again, thank you for allowing me to participate in the care of your patient.        Sincerely,        Stephanie Rivera PA-C

## 2024-06-18 NOTE — NURSING NOTE
Is the patient currently in the state of [place]? YES    Visit mode:VIDEO    If the visit is dropped, the patient can be reconnected by: VIDEO VISIT: Send to e-mail at: [email or web address]    Will anyone else be joining the visit? NO  (If patient encounters technical issues they should call [phone]    How would you like to obtain your AVS? MyChart    Are changes needed to the allergy or medication list? Bud for visit:   Jessica NAIR

## 2024-06-18 NOTE — PROGRESS NOTES
Virtual Visit Details    Type of service:  Video Visit   Video Start Time: 10:11 AM  Video End Time:10:20 AM    Originating Location (pt. Location): Home    Distant Location (provider location):  On-site  Platform used for Video Visit: [facility]    Oncology/Hematology Visit Note    Jun 18, 2024    Reason for visit: Follow up neutrophilia    Oncology HPI: Kimo Greenwood is a 43 year old male with neutrophilia.  He has had ongoing leukocytosis since 2012, specifically neutrophilia.  ANC gillian of 9.1 in 2017.  JAK2/MPL/CALR negative.  He has been following Dr. Amato.    Also, he was hospitalized for pneumonia in early 2023 and noted to have an incidental 8.7 cm lipomatous mass in the retroperitoneum.  Biopsy in March 2022 lipomatous tumor/liposarcoma, negative for MDM2.    Of note, he has liver lesions and followed by liver MRI annually.    He is here today for annual follow-up and liver MRI.    Interval History: Leo is over video today and he is doing well.  He is trying to lose weight in preparation for possible hip surgery in the future.  He has been told that he should try to lose 20-25 more pounds.  He admits that he does drink a lot of sugary sodas.  He denies any fever or chills, vomiting or diarrhea, bleeding or stool changes.    Review of Systems: See interval hx. Denies fevers, chills, HA, changes in vision, abdominal pain, N/V, diarrhea, changes in urination, bleeding, bruising.     PMHx and Social Hx reviewed per EPIC.      Medications:  Current Outpatient Medications   Medication Sig Dispense Refill    albuterol (VENTOLIN HFA) 108 (90 Base) MCG/ACT inhaler INHALE 2 PUFFS INTO LUNGS EVERY SIX HOURS AS NEEDED FOR SHORTNESS OF BREATH / DYSPNEA OR WHEEZING 18 g 1    calcium polycarbophil (FIBER-LAX) 625 MG tablet Take 2 tablets (1,250 mg) by mouth 2 times daily 360 tablet 0    celecoxib (CELEBREX) 100 MG capsule Take 1 capsule (100 mg) by mouth 2 times daily 60 capsule 1    DULoxetine (CYMBALTA) 60 MG capsule  Take 120 mg by mouth daily       ferrous sulfate (FEROSUL) 325 (65 Fe) MG tablet Take 1 tablet (325 mg) by mouth daily (with breakfast) 30 tablet 2    hydrochlorothiazide (HYDRODIURIL) 25 MG tablet Take 1 tablet (25 mg) by mouth daily 30 tablet 7    lisinopril (ZESTRIL) 10 MG tablet Take 1 tablet (10 mg) by mouth every morning 30 tablet 7    mirabegron (MYRBETRIQ) 50 MG 24 hr tablet Take 1 tablet by mouth daily      nicotine (NICORETTE) 2 MG gum Place 1 each (2 mg) inside cheek every hour as needed for nicotine withdrawal symptoms 50 each 1    nystatin-triamcinolone (MYCOLOG II) 282140-3.1 UNIT/GM-% external cream Apply topically 2 times daily 60 g 0    OLANZapine-samidorphan (LYBALVI) 10-10 MG TABS tablet Take 1 tablet by mouth At Bedtime      order for DME Equipment being ordered: CPAP supplies 1 each 0    oxyBUTYnin ER (DITROPAN XL) 15 MG 24 hr tablet Take 2 tablets (30 mg) by mouth daily 60 tablet 1    pantoprazole (PROTONIX) 40 MG EC tablet TAKE 1 TABLET BY MOUTH ONCE DAILY 30-60 MINUTES BR FIRST MEAL OF THE DAY 30 tablet 7    prazosin (MINIPRESS) 1 MG capsule Take 3 mg by mouth At Bedtime      tirzepatide-Weight Management (ZEPBOUND) 10 MG/0.5ML prefilled pen Inject 0.5 mLs (10 mg) Subcutaneous every 7 days 2 mL 3    tirzepatide-Weight Management (ZEPBOUND) 12.5 MG/0.5ML prefilled pen Inject 0.5 mLs (12.5 mg) Subcutaneous every 7 days 2 mL 0       Allergies   Allergen Reactions    Other [No Clinical Screening - See Comments] Other (See Comments)     Awoke from anesthesia with anger and ripping off dressing, after interstim placement, outside hospital 2013       EXAM:    Ht 1.829 m (6')   Wt (!) 165.6 kg (365 lb)   BMI 49.50 kg/m   Video, no full VS.     GENERAL:  Male, in no acute distress.  Alert and oriented x3. Well groomed.   HEENT:  Normocephalic, atraumatic. No conjunctival injection or eye swelling.   LUNGS:  Nonlabored breathing, no cough or audible wheezing, able to speak full sentences.  MSK: Full  ROM UE.    SKIN: No rash on exposed skin.   NEURO: CN grossly intact, speech normal  PSYCH: Mentation appears normal, insight and judgement intact      Labs:    06/11/24 15:07   WBC 9.3   Hemoglobin 16.3   Hematocrit 49.1   Platelet Count 154   RBC Count 5.72   MCV 86   MCH 28.5   MCHC 33.2   RDW 13.9   % Neutrophils 71   % Lymphocytes 22   % Monocytes 4   % Eosinophils 3   % Basophils 0   Absolute Basophils 0.0   Absolute Eosinophils 0.3   Absolute Immature Granulocytes 0.0   Absolute Lymphocytes 2.0   Absolute Monocytes 0.4   % Immature Granulocytes 0   Absolute Neutrophils 6.5   Absolute NRBCs 0.0   NRBCs per 100 WBC 0     Imaging:   CT LIVER WITHOUT AND WITH CONTRAST June 11, 2024 3:56 PM      HISTORY: Liver lesion.     COMPARISON: CT abdomen and pelvis 6/27/2023, 11/17/2022.     TECHNIQUE: Axial CT images acquired through the abdomen after  administration of 100mL Isovue-370 during arterial and portal venous  phases through the liver. Radiation dose for this scan was reduced  using automated exposure control, adjustment of the mA and/or kV  according to patient size, or iterative reconstruction technique.  Sagittal and coronal reconstructions performed.     FINDINGS:   LOWER CHEST: Trace bilateral pleural effusions.     HEPATOBILIARY: Since 11/17/2022, similar in size and morphology of a  segment 7 hypoenhancing observation measuring 2.7 cm, previously 2.7  cm when measured similarly. No new suspicious liver lesion.  Cholelithiasis.      PANCREAS: No significant mass, duct dilatation, or inflammatory  change.     SPLEEN: Unremarkable.     ADRENAL GLANDS: Similar in size left adrenal 10.4 cm myelolipoma.     KIDNEYS: No significant mass, stones, or hydronephrosis.     BOWEL: No obstruction or inflammatory change.     VASCULATURE: Nonaneurysmal abdominal aorta.     LYMPH NODE AND PERITONEUM: No enlarged lymph node. No free fluid.     MUSCULOSKELETAL: Small fat-containing periumbilical hernia. No  aggressive  osseous lesion. Ankylosis of the thoracolumbar spine.     OTHER: None.                                                                      IMPRESSION:   1. Since 11/17/2022, similar segment 7 hepatic observation. No new  liver lesion.  2. Trace bilateral pleural effusions.    Impression/Plan: Kimo Greenwood is a 43 year old male with:    Chronic anemia and intermittent neutrophilia  -Patient has evidence of chronic, intermittent neutrophilia and NC/NC anemia.  -Asymptomatic.  -Repeat CBC in July 2022 with persistent neutrophilia and NC/NC anemia. Negative for dysplasia or evidence of blasts.   -Iron studies, B12, folate, TSH WNL in July 2022.  -FLOW cytometry normal 7/2022.  -CT CAP is showing normal spleen size.   -Over the last year, WBC has ranged 9-18 with ANC  7-15. Currently, WBC is 9.3, ANC 6.5. Normal Hb and platelets.  -JAK2/MPL/CALR negative.   -BCR-ABL p190 negative; p210 negative.  -Hold on bone marrow biopsy at this time.  -DR Amato has left this practice and plan to repeat CBC with new MD in 1 year.     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  -9/2022 scans show stability.  -11/2022 scans show stability.  -6/2023 scans show stability.  -Followed by Dr. Ojeda.     Liver lesions  -CT 11/17/22 did not visualize the previous lesion at segment 9; he still has a stable nodular mass of the posterior right liver.  -Repeat imaging 6/2024 unable to visualize.  -CT liver ordered in 1 year      Chart documentation with Dragon Voice recognition Software. Although reviewed after completion, some words and grammatical errors may remain.    30 minutes spent on the date of the encounter doing chart review, review of test results, interpretation of tests, patient visit, and documentation     Stephanie Rivera PA-C  Hematology/Oncology  [facility]

## 2024-06-19 ENCOUNTER — PATIENT OUTREACH (OUTPATIENT)
Dept: CARE COORDINATION | Facility: CLINIC | Age: 44
End: 2024-06-19
Payer: COMMERCIAL

## 2024-06-19 NOTE — PROGRESS NOTES
"Social Work - Distress Screen Intervention  [facility]    Identified Concern and Score from Distress Screenin. How concerned are you about your ability to eat? 0     2. How concerned are you about unintended weight loss or your current weight? 0     3. How concerned are you about feeling depressed or very sad?  (!) 8     4. How concerned are you about feeling anxious or very scared?  6     5. Do you struggle with the loss of meaning and digna in your life?  Quite a bit     6. How concerned are you about work and home life issues that may be affected by your cancer?  0     7. How concerned are you about knowing what resources are available to help you?  0     8. Do you currently have what you would describe as Latter-day or spiritual struggles? Not at all     9. If you want to be contacted by one of our professionals, I can send a message to them right now.  No data recorded     Date of Distress Screen: 24  Data: At time of last visit, patient scored positive on distress screening.  outreached to patient today to follow up on elevated distress and introduce psychosocial services and support.  Intervention/Education provided:  contacted patient's mobile phone and Crys Mckeon answered. Crys reports that she has worked with Willard for 10 years and is his private care coordinator at present time. Crys reports the following support for Willard in home:   - Willard is on DD waiver  - talk therapist he sees every other week  - psychiatrist he engages with quarterly  - [employer] program (working to increase social connection)  - medication dispenser    Crys reports that she perceives that Willard is \"depressed\" and engages frequently with his providers regarding her concerns about his emotional health. Crys reports that she worries about Willard's use of nicotine and lack of motivation. Crys reports that Willard is reluctant to engage with pool walking exercises. Crys reports that " he is able to engage in some IADLs in home (cleaning, meal prep etc).     This clinician called and spoke with Willard, oriented to role and reason for call. Willard denied feeling depressed and terminated call. SW attempted return call and LVM with social work contact information and availability.     Follow-up Required:   This clinician will route phone note to primary care physician and Stephanie Rivera for awareness.     Due to Willard's not having an active cancer diagnosis this clinician will not follow for ongoing support.     JOSE Curran, LICSW, OSW-C  Clinical - Adult Oncology  Phone: [phone]  She/Her/Hers  [facility]: Kathleen GARCIA  8am-4:30pm  [facility]: MARIVEL Myers F 8am-4:30pm   Support Groups at [facility]: Social Work Services for Cancer Patients ([email or web address])

## 2024-06-20 ENCOUNTER — TELEPHONE (OUTPATIENT)
Dept: ENDOCRINOLOGY | Facility: CLINIC | Age: 44
End: 2024-06-20
Payer: COMMERCIAL

## 2024-06-20 NOTE — TELEPHONE ENCOUNTER
Talked with patient regarding rescheduling the following appointment (as [facility] no longer available):    Appointment type: GITA ZPEEDA  Provider: Nya Lara  Return date: 7/16/24  Specialty phone number: [phone]  Additional Notes:    She is actively watching the waitlist.  She will call back to schedule with another PA if nothing comes up.

## 2024-06-24 ENCOUNTER — TELEPHONE (OUTPATIENT)
Dept: ENDOCRINOLOGY | Facility: CLINIC | Age: 44
End: 2024-06-24
Payer: COMMERCIAL

## 2024-06-25 NOTE — TELEPHONE ENCOUNTER
Do we know, are pt's guardians planning to be on site or available by phone for consent day of procedure?    Love LÓPEZ RN Care Coordinator  [facility]

## 2024-06-25 NOTE — TELEPHONE ENCOUNTER
Call placed to Jose pt's care coordinator and primary # on file. Left detailed message asking she call us back to verify if pt's guardians will be available or on site the day of procedure. Left message to return call.

## 2024-06-27 ENCOUNTER — OFFICE VISIT (OUTPATIENT)
Dept: ENDOCRINOLOGY | Facility: CLINIC | Age: 44
End: 2024-06-27
Payer: COMMERCIAL

## 2024-06-27 ENCOUNTER — TELEPHONE (OUTPATIENT)
Dept: ENDOCRINOLOGY | Facility: CLINIC | Age: 44
End: 2024-06-27

## 2024-06-27 VITALS
DIASTOLIC BLOOD PRESSURE: 79 MMHG | HEART RATE: 70 BPM | WEIGHT: 315 LBS | OXYGEN SATURATION: 97 % | BODY MASS INDEX: 41.75 KG/M2 | SYSTOLIC BLOOD PRESSURE: 121 MMHG | HEIGHT: 73 IN

## 2024-06-27 DIAGNOSIS — E66.813 CLASS 3 SEVERE OBESITY DUE TO EXCESS CALORIES WITH SERIOUS COMORBIDITY AND BODY MASS INDEX (BMI) OF 50.0 TO 59.9 IN ADULT (H): ICD-10-CM

## 2024-06-27 DIAGNOSIS — E66.01 CLASS 3 SEVERE OBESITY DUE TO EXCESS CALORIES WITH SERIOUS COMORBIDITY AND BODY MASS INDEX (BMI) OF 50.0 TO 59.9 IN ADULT (H): ICD-10-CM

## 2024-06-27 PROCEDURE — G2211 COMPLEX E/M VISIT ADD ON: HCPCS

## 2024-06-27 PROCEDURE — 99213 OFFICE O/P EST LOW 20 MIN: CPT

## 2024-06-27 ASSESSMENT — PAIN SCALES - GENERAL: PAINLEVEL: EXTREME PAIN (9)

## 2024-06-27 NOTE — TELEPHONE ENCOUNTER
Detailed message received from pt's care coordinator, Jose. She confirmed there will be a guardian available for phone consent the day of procedure 7/1 at 10:45am.

## 2024-06-27 NOTE — TELEPHONE ENCOUNTER
Medication Question or Refill        What medication are you calling about (include dose and sig)?: Zepbound 12 & 15 mg    Preferred Pharmacy:   [id number] - [place], [place] - [address]  [address]  [address]  [facility] [address]  Phone: [phone] Fax: [phone]          Controlled Substance Agreement on file:   CSA -- Patient Level:    CSA: None found at the patient level.       Who prescribed the medication?: Nya Camarena Marco    Do you need a refill? Yes    When did you use the medication last? N/a    Patient offered an appointment? No    Do you have any questions or concerns?  Yes: care coordinator for the pt called stating she spoke with [employer] & was told they received a discontinuation for the medication.      Could we send this information to you in [employer] or would you prefer to receive a phone call?:   Patient would prefer a phone call   Okay to leave a detailed message?: Yes at Cell number on file:    Telephone Information:   Mobile [phone]

## 2024-06-27 NOTE — NURSING NOTE
"(   Chief Complaint   Patient presents with    RECHECK     Weight management    )    ( Weight: (!) 360 lb )  ( Height: 6' 1\" )  ( BMI (Calculated): 47.5 )  (   )  ( Cumulative weight loss (lbs): 47 )  ( Last Visits Weight: 365 lb )  ( Wt change since last visit (lbs): -5 )  (   )  (   )    ( BP: 121/79 )  (   )  (   )  (   )  ( Pulse: 70 )  (   )  ( SpO2: 97 % )    (   Patient Active Problem List   Diagnosis    Speech/language delay    Tobacco use disorder    Nocturnal enuresis    Moderate major depression (H)    LOREN (obstructive sleep apnea)    Essential hypertension    Class 3 severe obesity with serious comorbidity and body mass index (BMI) of 50.0 to 59.9 in adult (H)    Leukocytosis    Suicidal ideation    Chronic low back pain    Bilateral low back pain with left-sided sciatica    History of colonic polyps    Mild intellectual disability    S/P total hip arthroplasty    Vitamin D deficiency    LPRD (laryngopharyngeal reflux disease)    Patel's esophagus determined by biopsy    Mild intermittent asthma    Somatic dysfunction of lumbar region    Somatic dysfunction of sacral region    Sacral pain    Thoracic segment dysfunction    Hip pain, left    Duodenitis    Overactive bladder    Venous stasis dermatitis of both lower extremities    Severe recurrent major depressive disorder with psychotic features (H)    Alcohol use disorder, mild, abuse    Cannabis use disorder, mild, abuse    Mood change    )  (   Current Outpatient Medications   Medication Sig Dispense Refill    albuterol (VENTOLIN HFA) 108 (90 Base) MCG/ACT inhaler INHALE 2 PUFFS INTO LUNGS EVERY SIX HOURS AS NEEDED FOR SHORTNESS OF BREATH / DYSPNEA OR WHEEZING 18 g 1    calcium polycarbophil (FIBER-LAX) 625 MG tablet Take 2 tablets (1,250 mg) by mouth 2 times daily 360 tablet 0    celecoxib (CELEBREX) 100 MG capsule Take 1 capsule (100 mg) by mouth 2 times daily 60 capsule 1    DULoxetine (CYMBALTA) 60 MG capsule Take 120 mg by mouth daily       " ferrous sulfate (FEROSUL) 325 (65 Fe) MG tablet Take 1 tablet (325 mg) by mouth daily (with breakfast) 30 tablet 2    hydrochlorothiazide (HYDRODIURIL) 25 MG tablet Take 1 tablet (25 mg) by mouth daily 30 tablet 7    lisinopril (ZESTRIL) 10 MG tablet Take 1 tablet (10 mg) by mouth every morning 30 tablet 7    mirabegron (MYRBETRIQ) 50 MG 24 hr tablet Take 1 tablet by mouth daily      nicotine (NICORETTE) 2 MG gum Place 1 each (2 mg) inside cheek every hour as needed for nicotine withdrawal symptoms 50 each 1    nystatin-triamcinolone (MYCOLOG II) 077949-9.1 UNIT/GM-% external cream Apply topically 2 times daily 60 g 0    OLANZapine-samidorphan (LYBALVI) 10-10 MG TABS tablet Take 1 tablet by mouth At Bedtime      order for DME Equipment being ordered: CPAP supplies 1 each 0    oxyBUTYnin ER (DITROPAN XL) 15 MG 24 hr tablet Take 2 tablets (30 mg) by mouth daily 60 tablet 1    pantoprazole (PROTONIX) 40 MG EC tablet TAKE 1 TABLET BY MOUTH ONCE DAILY 30-60 MINUTES BR FIRST MEAL OF THE DAY 30 tablet 7    prazosin (MINIPRESS) 1 MG capsule Take 3 mg by mouth At Bedtime      tirzepatide-Weight Management (ZEPBOUND) 10 MG/0.5ML prefilled pen Inject 0.5 mLs (10 mg) Subcutaneous every 7 days 2 mL 3    tirzepatide-Weight Management (ZEPBOUND) 12.5 MG/0.5ML prefilled pen Inject 0.5 mLs (12.5 mg) Subcutaneous every 7 days 2 mL 0    )  ( Diabetes Eval:    )    ( Pain Eval:  Extreme Pain (9) )    ( Wound Eval:       )    (   History   Smoking Status    Every Day    Types: Vaping Device, Cigarettes   Smokeless Tobacco    Current    Types: Chew    )    ( Signed By:  Jessica Haas; June 27, 2024; 9:17 AM )

## 2024-06-27 NOTE — TELEPHONE ENCOUNTER
Called and spoke with Crys in regards to Willard's prescription. Advised that 10mg dose was discontinued, as 12.5mg and 15mg doses were sent in. Crys will follow up with the pharmacy and call the clinic back if there is a problem.

## 2024-06-27 NOTE — PATIENT INSTRUCTIONS
"Robert Lamar,   Thank you for allowing us the privilege of caring for you. We hope we provided you with the excellent service you deserve.   Please let us know if there is anything else we can do for you so that we can be sure you are completely satisfied with your care experience.    To ensure the quality of our services you may be receiving a patient satisfaction survey from an independent patient satisfaction monitoring company.    The greatest compliment you can give is a \"Likely to Recommend\"    Your visit was with Nya Lara PA-C today.    Instructions per today's visit:     Increase Zepbound 15mg once weekly.   If not able to dose increase, continue on Zepbound 12.5mg   Work on decreasing mountain dew or transitioning to diet mountain dew   Continue to work on nicotine cessation    Nya Camarena on 10/10/2024    ___________________________________________________________________________  Important contact and scheduling information:  Please call our contact center at [phone] to schedule your next appointments.  For any nursing questions or concerns call Niurka Haque LPN at [phone] or Denise Hubbard RN at [phone]  Please call during clinic hours Monday through Friday 8:00a - 4:00p if you have questions or you can contact us via [employer] at anytime and we will reply during clinic hours.    Lab results will be communicated through My Chart or letter (if My Chart not used). Please call the clinic if you have not received communication after 1 week or if you have any questions.?  Clinic Fax: [phone]  __________________________________________________________________________    If labs were ordered today:    Please make an appointment to have them drawn at your convenience.     To schedule the Lab Appointment using [employer]:  Select \"Schedule an Appointment\"  Select \"Lab Only\"  For \"A couple of questions\", select \"Other\"  For \"Which locations work for you?, select the location and set up the " appointment    To schedule by phone call [phone] to schedule a lab only appointment at any [facility] lab.  ___________________________________________________________________________  Work with A Health !  Virtual Sessions are Available through [facility]    To learn more, call to schedule a free, Health  Q&A appointment: [phone]     What is Health Coaching?  Do you know what you are supposed to do, but you just aren't doing it?  Then, HEALTH COACHING may help you!   Get unstuck and move forward with the support of a professionally trained NBC-HWC (National Board-Certified Health and ) who uses evidence-based approaches to help you move forward with healthy lifestyle changes in the areas of weight loss, stress management and overall well-being.    Health Coaches help you identify goals that will work best for you. Health Coaches provide support and encouragement with overcoming barriers and help you to find inspiration and motivation to lead a healthy lifestyle.    Option one:  Health Coaching 3-Pack; Three, 30-minute Health Coaching Visits, for $99  Visits are done virtually (phone or video)  This is a self pay service; we do not accept insurance for mansi coaching.    Option two:   The 24 week Plan; 11 Health Coaching Visits, and a 7 months subscription to [employer]-- on-demand fitness, nutrition and mindfulness classes, for $499 (employee discounts may be available). Participants will also meet regularly with a weight management Medical Provider and a Registered/Licensed Dietician.  This is a self-pay service; we do not accept insurance for health coaching.    To Schedule a free Health  Q&A appointment to learn more,  call [phone].  ____________________________________________________________________  [facility]  Healthy Lifestyle Group    Healthy Lifestyle Group  This is a 60 minute  "virtual coaching group for those who want to lead a healthier lifestyle. Come together to set goals and overcome barriers in a supportive group environment. We will address the four pillars of health--nutrition, exercise, sleep and emotional well-being.  This group is highly recommended for those who are participating in the 24 week Healthy Lifestyle Plan and our Health Coaching sessions.    WHEN: This group meets the first Friday of the month, 12:30 PM - 1:30 PM online, via a zoom meeting.      FACILITATOR: Led by National Board Certified Health and , Jacklyn Benedict, [facility].    TO REGISTER: Please call the Call Center at [phone] to register. You will get an appointment to attend in [employer]. Fifteen minutes prior to the meeting, complete the e-check in and you will get the link to join the meeting.  There is no charge to attend this group and space is limited.      2023 and 2024 Meeting Topics and Dates:    November 3: Introduction to Mindfulness (Learn simple and effective mindfulness practices and how it can benefit you)    December 8: Let's Talk (guided discussion on our wins and challenges)    January 5: New Years Vision: Manifest your Best 2024! (Guided imagery,  journaling and discussion)    February 2: Let's Talk    March 1: 10 Percent Happier by Laureano Avina (Book Bites; a guided discussion on the nuggets of wisdom from favorite wellness books; no need to read the book but highly encouraged)    April 5: Let's Talk    May 3: \"Essentialism; The Disciplined Pursuit of Less by Balta Cartwright (book bites discussion)    June 7: Let's Talk    July 5: NO MEETING, off for the 4th of July Holiday    August 2: The Blue Zones, Secrets for Living a Longer Life by Laureano Mike (book bites discussion)      If you would like bariatric surgery specific support group info please let your care team know.         Thank you,   [facility] Comprehensive Weight Management Team                          "

## 2024-06-27 NOTE — LETTER
2024       RE: Kimo Greenwood   [address]  [facility] [address]     Dear Colleague,    Thank you for referring your patient, Kimo Greenwood, to the [facility] [place] at [facility]. Please see a copy of my visit note below.      Return Medical Weight Management Note     Kimo Greenwood  MRN:  [id number]  :  1980  SALEEM:  2024    Dear Ciro Love MD,    I had the pleasure of seeing your patient Kimo Greenwood. He is a 43 year old male who I am continuing to see for treatment of obesity related to:        2023     1:43 PM   --   I have the following health issues associated with obesity None of the above       Assessment & Plan  Problem List Items Addressed This Visit       Class 3 severe obesity with serious comorbidity and body mass index (BMI) of 50.0 to 59.9 in adult (H)    Relevant Medications    tirzepatide-Weight Management (ZEPBOUND) 15 MG/0.5ML prefilled pen    tirzepatide-Weight Management (ZEPBOUND) 12.5 MG/0.5ML prefilled pen        Increase Zepbound 15mg once weekly.   If not able to dose increase, continue on Zepbound 12.5mg   Work on decreasing mountain dew or transitioning to diet mountain dew   Continue to work on nicotine cessation    Nya Camarena on 10/10/2024      INTERVAL HISTORY:  First seen for NBS - 2023  Continued with MWM, concerned for post op diet.   Weight loss required for hip replacement <330lbs  Transitioned to Wegovy 1.0mg   3/13/23 - continued Wegovy 1.0mg, goal of decreasing pop to 1-2 bottles daily   Reached out via mychart 23 - increased Wegovy 1.7mg   23 - continued Wegovy 1.7mg   2023- continued wegovy 1.7, was finding it helpful with hunger and weight loss    Reached out via mychart and increased Wegovy 2.4mg    Tried to transition to zepbound, but was not on formulary  Transitioned to Zepbound      Missed 2 weeks of zepbound and  during that time regained weight. Since then has continued to lose weight.   Still using nicotine products. Will discuss hip replacement with ortho once at weight of 340lbs.     Anti-obesity medication history    Current:   Zepbound 12.5mg - no side effects. Thinks it has been helpful with dose increase. Has been hard to find, which has been frustrating.     Recent diet changes: Decrease in milk and ice cream. Still drinking pop - around a 6 pack daily - mountain dew. Has been trying to drink more water and sparkling water.     Recent exercise/activity changes: Started pool therapy is going 1xweek - is hoping to go 2xweek. Continues to have hip and knee pain.       CURRENT WEIGHT:   360 lbs 0 oz    Initial Weight (lbs): 407 lbs  Last Visits Weight: 165.6 kg (365 lb)  Cumulative weight loss (lbs): 47  Weight Loss Percentage: 11.55%    Wt Readings from Last 5 Encounters:   06/27/24 (!) 163.3 kg (360 lb)   06/18/24 (!) 165.6 kg (365 lb)   06/03/24 (!) 168.7 kg (372 lb)   04/18/24 (!) 165.1 kg (364 lb)   04/10/24 (!) 168.9 kg (372 lb 4.8 oz)             6/27/2024     9:11 AM   Changes and Difficulties   I have made the following changes to my diet since my last visit: less ice cream and niurka milk   With regards to my diet, I am still struggling with: pop   I have made the following changes to my activity/exercise since my last visit: pt pool   With regards to my activity/exercise, I am still struggling with: would like more pool therapy         MEDICATIONS:   Current Outpatient Medications   Medication Sig Dispense Refill    albuterol (VENTOLIN HFA) 108 (90 Base) MCG/ACT inhaler INHALE 2 PUFFS INTO LUNGS EVERY SIX HOURS AS NEEDED FOR SHORTNESS OF BREATH / DYSPNEA OR WHEEZING 18 g 1    calcium polycarbophil (FIBER-LAX) 625 MG tablet Take 2 tablets (1,250 mg) by mouth 2 times daily 360 tablet 0    celecoxib (CELEBREX) 100 MG capsule Take 1 capsule (100 mg) by mouth 2 times daily 60 capsule 1    DULoxetine (CYMBALTA) 60 MG  "capsule Take 120 mg by mouth daily       ferrous sulfate (FEROSUL) 325 (65 Fe) MG tablet Take 1 tablet (325 mg) by mouth daily (with breakfast) 30 tablet 2    hydrochlorothiazide (HYDRODIURIL) 25 MG tablet Take 1 tablet (25 mg) by mouth daily 30 tablet 7    lisinopril (ZESTRIL) 10 MG tablet Take 1 tablet (10 mg) by mouth every morning 30 tablet 7    mirabegron (MYRBETRIQ) 50 MG 24 hr tablet Take 1 tablet by mouth daily      nicotine (NICORETTE) 2 MG gum Place 1 each (2 mg) inside cheek every hour as needed for nicotine withdrawal symptoms 50 each 1    nystatin-triamcinolone (MYCOLOG II) 477504-4.1 UNIT/GM-% external cream Apply topically 2 times daily 60 g 0    OLANZapine-samidorphan (LYBALVI) 10-10 MG TABS tablet Take 1 tablet by mouth At Bedtime      order for DME Equipment being ordered: CPAP supplies 1 each 0    oxyBUTYnin ER (DITROPAN XL) 15 MG 24 hr tablet Take 2 tablets (30 mg) by mouth daily 60 tablet 1    pantoprazole (PROTONIX) 40 MG EC tablet TAKE 1 TABLET BY MOUTH ONCE DAILY 30-60 MINUTES BR FIRST MEAL OF THE DAY 30 tablet 7    prazosin (MINIPRESS) 1 MG capsule Take 3 mg by mouth At Bedtime      tirzepatide-Weight Management (ZEPBOUND) 12.5 MG/0.5ML prefilled pen Inject 0.5 mLs (12.5 mg) Subcutaneous every 7 days 2 mL 1    tirzepatide-Weight Management (ZEPBOUND) 15 MG/0.5ML prefilled pen Inject 0.5 mLs (15 mg) Subcutaneous every 7 days 2 mL 4           6/27/2024     9:11 AM   Weight Loss Medication History Reviewed With Patient   Are you having any side effects from the weight loss medication that we have prescribed you? No         Objective   /79 (BP Location: Right arm, Patient Position: Sitting, Cuff Size: Adult Large)   Pulse 70   Ht 1.854 m (6' 1\")   Wt (!) 163.3 kg (360 lb)   SpO2 97%   BMI 47.50 kg/m        PHYSICAL EXAM:  GENERAL: alert and no distress  EYES: Eyes grossly normal to inspection.  No discharge or erythema, or obvious scleral/conjunctival abnormalities.  RESP: No audible " wheeze, cough, or visible cyanosis.    SKIN: Visible skin clear. No significant rash, abnormal pigmentation or lesions.  NEURO: Cranial nerves grossly intact.  Mentation and speech appropriate for age.  PSYCH: Appropriate affect, tone, and pace of words        Sincerely,    Nya Lara PA-C      22 minutes spent by me on the date of the encounter doing chart review, history and exam, documentation and further activities per the note    The longitudinal plan of care for the diagnosis(es)/condition(s) as documented were addressed during this visit. Due to the added complexity in care, I will continue to support Willard in the subsequent management and with ongoing continuity of care.

## 2024-06-27 NOTE — PROGRESS NOTES
Return Medical Weight Management Note     Kimo Greenwood  MRN:  [id number]  :  1980  SALEEM:  2024    Dear Ciro Love MD,    I had the pleasure of seeing your patient Kimo Greenwood. He is a 43 year old male who I am continuing to see for treatment of obesity related to:        2023     1:43 PM   --   I have the following health issues associated with obesity None of the above       Assessment & Plan   Problem List Items Addressed This Visit       Class 3 severe obesity with serious comorbidity and body mass index (BMI) of 50.0 to 59.9 in adult (H)    Relevant Medications    tirzepatide-Weight Management (ZEPBOUND) 15 MG/0.5ML prefilled pen    tirzepatide-Weight Management (ZEPBOUND) 12.5 MG/0.5ML prefilled pen        Increase Zepbound 15mg once weekly.   If not able to dose increase, continue on Zepbound 12.5mg   Work on decreasing mountain dew or transitioning to diet mountain dew   Continue to work on nicotine cessation    Nya Camarena on 10/10/2024      INTERVAL HISTORY:  First seen for NBS - 2023  Continued with MWM, concerned for post op diet.   Weight loss required for hip replacement <330lbs  Transitioned to Wegovy 1.0mg   3/13/23 - continued Wegovy 1.0mg, goal of decreasing pop to 1-2 bottles daily   Reached out via [employer] 23 - increased Wegovy 1.7mg   23 - continued Wegovy 1.7mg   2023- continued wegovy 1.7, was finding it helpful with hunger and weight loss    Reached out via [employer] and increased Wegovy 2.4mg    Tried to transition to zepbound, but was not on formulary  Transitioned to Zepbound      Missed 2 weeks of zepbound and during that time regained weight. Since then has continued to lose weight.   Still using nicotine products. Will discuss hip replacement with ortho once at weight of 340lbs.     Anti-obesity medication history    Current:   Zepbound 12.5mg - no side effects. Thinks it has been helpful with dose increase. Has been hard to find, which has  been frustrating.     Recent diet changes: Decrease in milk and ice cream. Still drinking pop - around a 6 pack daily - mountain dew. Has been trying to drink more water and sparkling water.     Recent exercise/activity changes: Started pool therapy is going 1xweek - is hoping to go 2xweek. Continues to have hip and knee pain.       CURRENT WEIGHT:   360 lbs 0 oz    Initial Weight (lbs): 407 lbs  Last Visits Weight: 165.6 kg (365 lb)  Cumulative weight loss (lbs): 47  Weight Loss Percentage: 11.55%    Wt Readings from Last 5 Encounters:   06/27/24 (!) 163.3 kg (360 lb)   06/18/24 (!) 165.6 kg (365 lb)   06/03/24 (!) 168.7 kg (372 lb)   04/18/24 (!) 165.1 kg (364 lb)   04/10/24 (!) 168.9 kg (372 lb 4.8 oz)             6/27/2024     9:11 AM   Changes and Difficulties   I have made the following changes to my diet since my last visit: less ice cream and niurka milk   With regards to my diet, I am still struggling with: pop   I have made the following changes to my activity/exercise since my last visit: pt pool   With regards to my activity/exercise, I am still struggling with: would like more pool therapy         MEDICATIONS:   Current Outpatient Medications   Medication Sig Dispense Refill    albuterol (VENTOLIN HFA) 108 (90 Base) MCG/ACT inhaler INHALE 2 PUFFS INTO LUNGS EVERY SIX HOURS AS NEEDED FOR SHORTNESS OF BREATH / DYSPNEA OR WHEEZING 18 g 1    calcium polycarbophil (FIBER-LAX) 625 MG tablet Take 2 tablets (1,250 mg) by mouth 2 times daily 360 tablet 0    celecoxib (CELEBREX) 100 MG capsule Take 1 capsule (100 mg) by mouth 2 times daily 60 capsule 1    DULoxetine (CYMBALTA) 60 MG capsule Take 120 mg by mouth daily       ferrous sulfate (FEROSUL) 325 (65 Fe) MG tablet Take 1 tablet (325 mg) by mouth daily (with breakfast) 30 tablet 2    hydrochlorothiazide (HYDRODIURIL) 25 MG tablet Take 1 tablet (25 mg) by mouth daily 30 tablet 7    lisinopril (ZESTRIL) 10 MG tablet Take 1 tablet (10 mg) by mouth every morning  "30 tablet 7    mirabegron (MYRBETRIQ) 50 MG 24 hr tablet Take 1 tablet by mouth daily      nicotine (NICORETTE) 2 MG gum Place 1 each (2 mg) inside cheek every hour as needed for nicotine withdrawal symptoms 50 each 1    nystatin-triamcinolone (MYCOLOG II) 173529-6.1 UNIT/GM-% external cream Apply topically 2 times daily 60 g 0    OLANZapine-samidorphan (LYBALVI) 10-10 MG TABS tablet Take 1 tablet by mouth At Bedtime      order for DME Equipment being ordered: CPAP supplies 1 each 0    oxyBUTYnin ER (DITROPAN XL) 15 MG 24 hr tablet Take 2 tablets (30 mg) by mouth daily 60 tablet 1    pantoprazole (PROTONIX) 40 MG EC tablet TAKE 1 TABLET BY MOUTH ONCE DAILY 30-60 MINUTES BR FIRST MEAL OF THE DAY 30 tablet 7    prazosin (MINIPRESS) 1 MG capsule Take 3 mg by mouth At Bedtime      tirzepatide-Weight Management (ZEPBOUND) 12.5 MG/0.5ML prefilled pen Inject 0.5 mLs (12.5 mg) Subcutaneous every 7 days 2 mL 1    tirzepatide-Weight Management (ZEPBOUND) 15 MG/0.5ML prefilled pen Inject 0.5 mLs (15 mg) Subcutaneous every 7 days 2 mL 4           6/27/2024     9:11 AM   Weight Loss Medication History Reviewed With Patient   Are you having any side effects from the weight loss medication that we have prescribed you? No         Objective    /79 (BP Location: Right arm, Patient Position: Sitting, Cuff Size: Adult Large)   Pulse 70   Ht 1.854 m (6' 1\")   Wt (!) 163.3 kg (360 lb)   SpO2 97%   BMI 47.50 kg/m        PHYSICAL EXAM:  GENERAL: alert and no distress  EYES: Eyes grossly normal to inspection.  No discharge or erythema, or obvious scleral/conjunctival abnormalities.  RESP: No audible wheeze, cough, or visible cyanosis.    SKIN: Visible skin clear. No significant rash, abnormal pigmentation or lesions.  NEURO: Cranial nerves grossly intact.  Mentation and speech appropriate for age.  PSYCH: Appropriate affect, tone, and pace of words        Sincerely,    Nya Lara PA-C      22 minutes spent by me on the date " of the encounter doing chart review, history and exam, documentation and further activities per the note    The longitudinal plan of care for the diagnosis(es)/condition(s) as documented were addressed during this visit. Due to the added complexity in care, I will continue to support Willard in the subsequent management and with ongoing continuity of care.

## 2024-06-28 NOTE — TELEPHONE ENCOUNTER
Jose calling back for clarification. Inquiring if they need to be at clinic or for telephone consent. Did explain that is needed for telephone consent. Stated understanding. She will reach out to sister to check on her availability for Monday.    She did state that the co guardians had previously signed something saying Jose could accompany patient to appointments. Explained a procedure consent is different and needed prior to any injection or procedure.

## 2024-06-28 NOTE — TELEPHONE ENCOUNTER
Jose calling back to confirm both co-guardians will be available for telephone consent on Monday.

## 2024-06-28 NOTE — TELEPHONE ENCOUNTER
"Per procedure staff Chelsi AUGUSTINE RN: \"There are 2 legal guardians listed. I believe both need to be present as they are \"co guardian\"     Phone call to Jose, patient's care coordinator. Left detailed message on dedicated voicemail.    "

## 2024-07-01 ENCOUNTER — RADIOLOGY INJECTION OFFICE VISIT (OUTPATIENT)
Dept: PALLIATIVE MEDICINE | Facility: CLINIC | Age: 44
End: 2024-07-01
Attending: FAMILY MEDICINE
Payer: COMMERCIAL

## 2024-07-01 VITALS — OXYGEN SATURATION: 97 % | SYSTOLIC BLOOD PRESSURE: 129 MMHG | DIASTOLIC BLOOD PRESSURE: 75 MMHG | HEART RATE: 75 BPM

## 2024-07-01 DIAGNOSIS — G89.29 BILATERAL CHRONIC KNEE PAIN: ICD-10-CM

## 2024-07-01 DIAGNOSIS — M17.0 PRIMARY OSTEOARTHRITIS OF BOTH KNEES: ICD-10-CM

## 2024-07-01 DIAGNOSIS — M25.562 BILATERAL CHRONIC KNEE PAIN: ICD-10-CM

## 2024-07-01 DIAGNOSIS — M25.561 BILATERAL CHRONIC KNEE PAIN: ICD-10-CM

## 2024-07-01 PROCEDURE — 64454 NJX AA&/STRD GNCLR NRV BRNCH: CPT | Mod: RT | Performed by: PHYSICAL MEDICINE & REHABILITATION

## 2024-07-01 RX ORDER — BUPIVACAINE HYDROCHLORIDE 5 MG/ML
4 INJECTION, SOLUTION EPIDURAL; INTRACAUDAL ONCE
Status: COMPLETED | OUTPATIENT
Start: 2024-07-01 | End: 2024-07-01

## 2024-07-01 RX ORDER — LIDOCAINE HYDROCHLORIDE 10 MG/ML
3.2 INJECTION, SOLUTION EPIDURAL; INFILTRATION; INTRACAUDAL; PERINEURAL ONCE
Status: COMPLETED | OUTPATIENT
Start: 2024-07-01 | End: 2024-07-01

## 2024-07-01 RX ADMIN — BUPIVACAINE HYDROCHLORIDE 20 MG: 5 INJECTION, SOLUTION EPIDURAL; INTRACAUDAL at 12:19

## 2024-07-01 RX ADMIN — LIDOCAINE HYDROCHLORIDE 3.2 ML: 10 INJECTION, SOLUTION EPIDURAL; INFILTRATION; INTRACAUDAL; PERINEURAL at 12:19

## 2024-07-01 NOTE — PROGRESS NOTES
PHYSICAL MEDICINE & REHABILITATION / MEDICAL SPINE      PROCEDURE DATE:  Jul 1, 2024      PATIENT NAME:  Kimo Greenwood  MRN:  [id number]  YOB: 1980      PRE-PROCEDURE DIAGNOSIS:  1. Primary osteoarthritis of both knees    2. Bilateral chronic knee pain        POST-PROCEDURE DIAGNOSIS:  1. Primary osteoarthritis of both knees    2. Bilateral chronic knee pain        PROCEDURE:  (CPT code:  74672)  Fluoroscopic-guided block of the left superolateral genicular nerve.  Fluoroscopic-guided block of the left superomedial genicular nerve.  Fluoroscopic-guided block of the left inferomedial genicular nerve.  Fluoroscopic-guided block of the terminal articular branch/es of the nerve to the left vastus intermedius.  Fluoroscopic-guided block of the right superolateral genicular nerve.  Fluoroscopic-guided block of the right superomedial genicular nerve.  Fluoroscopic-guided block of the right inferomedial genicular nerve.  Fluoroscopic-guided block of the terminal articular branch/es of the nerve to the right vastus intermedius.      PROCEDURE IN DETAIL:  Prior to the procedure, educational material was provided for the patient to review and take home.  After a discussion of the risks, benefits, and alternatives to the procedure, the patient expressed understanding and wished to proceed.  Consent form was signed.  The patient was brought to the fluoroscopy suite and placed in the supine position.  Procedural pause was conducted to verify:  patient identity, procedure to be performed, laterality, site, and patient position.    The affected knee was placed on a bolster to give clear lateral views of the left knee.  The expected course of the above mentioned nerves were identified with fluoroscopy, and the skin entry sites were planned.  The skin was sterilely prepped using chlorhexidine and draped in the usual fashion.  The skin and subcutaneous tissue were infiltrated with 0.4 ml 1% lidocaine at each  "injection site.  Then 25g 3.5\" Quincke needles were advanced to the targets:  the bony endpoint of the superolateral femoral epicondyle, the bony endpoint of the superomedial femoral epicondyle, the bony endpoint of the inferomedial tibial epicondyle, the midshaft of the femur approximately 3 fingerbreadths proximal to the patella.  Placement depth was confirmed with lateral fluoroscopic views.  After negative aspiration, 0.25 ml Omnipaque 300 (iohexol) was injected under live fluoroscopy at each location without evidence of intra-articular or intravascular uptake.  Next 0.5 ml 0.5% bupivacaine was injected at each location.  Following the injections, the needles were removed.    The affected knee was placed on a bolster to give clear lateral views of the right knee.  The expected course of the above mentioned nerves were identified with fluoroscopy, and the skin entry sites were planned.  The skin was sterilely prepped using chlorhexidine and draped in the usual fashion.  The skin and subcutaneous tissue were infiltrated with 0.4 ml 1% lidocaine at each injection site.  Then 25g 3.5\" Quincke needles were advanced to the targets:  the bony endpoint of the superolateral femoral epicondyle, the bony endpoint of the superomedial femoral epicondyle, the bony endpoint of the inferomedial tibial epicondyle, the midshaft of the femur approximately 3 fingerbreadths proximal to the patella.  Placement depth was confirmed with lateral fluoroscopic views.  After negative aspiration, 0.25 ml Omnipaque 300 (iohexol) was injected under live fluoroscopy at each location without evidence of intra-articular or intravascular uptake.  Next 0.5 ml 0.5% bupivacaine was injected at each location.  Following the injections, the needles were removed.    The patient tolerated the procedure well, and there were no apparent complications.  The patient was escorted back to the postprocedure room.  The patient was monitored for side effects.  " No reactions were noted.  After appropriate observation, the patient was dismissed from the clinic in good condition.    Preprocedure pain level: 9/10.  Postprocedure pain level: 2/10.      Leonid Beasley MD

## 2024-07-01 NOTE — NURSING NOTE
Pre-procedure Intake    If YES to any questions or NO to having a   Please complete laminated checklist and leave on the computer keyboard for Provider, verbally inform provider if able.    For SCS Trial, RFA's or any sedation procedure: Have you been fasting? NA  If yes, for how long?    NA     Are you taking any any blood thinners such as Coumadin, Warfarin, Jantoven, Pradaxa Xarelto, Eliquis, Edoxaban, Enoxaparin, Lovenox, Heparin, Arixtra, Fondaparinux, or Fragmin? OR Antiplatelet medication such as Plavix, Brilinta, or Effient? N/A  If yes, when did you take your last dose?   NA     Do you take aspirin?   NO  If cervical procedure, have you held aspirin for 6 days?   NA    Do you have any allergies to contrast dye, iodine, steroid and/or numbing medications?  NO     Are you currently taking antibiotics or have an active infection?  NO     Have you had a fever/elevated temperature within the past week? NO     Are you currently taking oral steroids? NO     Do you have a ? Yes     Are you pregnant or breastfeeding? NA     Have you received any vaccines in the past 2 weeks? NO       Notify provider and RNs if systolic BP >170, diastolic BP >100, P >100 or O2 sats < 90%

## 2024-07-01 NOTE — NURSING NOTE
Discharge Information    IV Discontiued Time:  NA    Amount of Fluid Infused:  NA    Discharge Criteria = When patient returns to baseline or as per MD order    Consciousness:  Pt is fully awake    Circulation:  BP +/- 20% of pre-procedure level    Respiration:  Patient is able to breathe deeply    O2 Sat:  Patient is able to maintain O2 Sat >92% on room air    Activity:  Moves 4 extremities on command    Ambulation:  Patient is able to stand and walk or stand and pivot into wheelchair    Dressing:  Clean/dry or No Dressing    Notes:   Discharge instructions and AVS given to patient    Patient meets criteria for discharge?  YES    Admitted to PCU?  No    Responsible adult present to accompany patient home?  Yes    Signature/Title:    Love Weathers RN  RN Care Coordinator  [facility]

## 2024-07-01 NOTE — PATIENT INSTRUCTIONS
[facility]   Medial Branch Block Discharge Instructions      Your procedure was performed by:  Dr. Albino Beasley    Medications used: bupivacaine     You will need to complete the Pain Scale Log form and return it to us as soon as possible.  Once we have received the form, we will review it and call you to determine the next steps.     Please return pain diary:  Fax:   [phone].  Mail:   [facility], [address] [place], [place] [id number].  Scan and send through zhiwo.  If you do not have zhiwo access, please see the After-Visit Summary for directions and activation code.  Drop off at the clinic: Monday-Friday between 7:30a-4p.  *NOTE:  This form must be completed and returned to ensure proper documentation for future injections.    You may resume your regular activity after the injection.  Be cautious with walking since you may have numbness and/or weakness in the lower extremities for up to 6-8 hours after the procedure due to the effects of the local anesthetic.  Avoid driving for 6 hours. The local anesthetic could slow your reflexes  You may shower, however no swimming or tub baths or hot tubs for 24 hours following your procedure.  Your pain will return after the numbing medications have worn off.  You may use your current pain medications as needed.  You may use anti-inflammatory medications (such as ibuprofen, aleve, or advil) or Tylenol for pain control if necessary.  Some people find it helpful to alternate ibuprofen and Tylenol every 3 hours for a couple of days.  You may use ice packs 10-15 minutes three to four times a day at the injection site for comfort.   Do not use heat to painful areas for 6 to 8 hours. This will give the local anesthetic time to wear off and prevent you from accidentally burning your skin.     If you experience any of the following, call the [facility] Center line during work hours at [phone]:  -Fever over 100 degree  F  -Swelling, bleeding, redness, drainage, warmth at the injection site  -Progressive weakness or numbness on your legs  -Unusual new onset of pain that is not improving

## 2024-07-10 ENCOUNTER — VIRTUAL VISIT (OUTPATIENT)
Dept: ENDOCRINOLOGY | Facility: CLINIC | Age: 44
End: 2024-07-10
Payer: COMMERCIAL

## 2024-07-10 ENCOUNTER — TELEPHONE (OUTPATIENT)
Dept: ENDOCRINOLOGY | Facility: CLINIC | Age: 44
End: 2024-07-10

## 2024-07-10 ENCOUNTER — TELEPHONE (OUTPATIENT)
Dept: ORTHOPEDICS | Facility: CLINIC | Age: 44
End: 2024-07-10

## 2024-07-10 VITALS — WEIGHT: 315 LBS | HEIGHT: 73 IN | BODY MASS INDEX: 41.75 KG/M2

## 2024-07-10 DIAGNOSIS — E66.9 OBESITY: ICD-10-CM

## 2024-07-10 DIAGNOSIS — Z71.3 NUTRITIONAL COUNSELING: Primary | ICD-10-CM

## 2024-07-10 PROCEDURE — 97803 MED NUTRITION INDIV SUBSEQ: CPT | Mod: 95

## 2024-07-10 PROCEDURE — 99207 PR NO CHARGE LOS: CPT | Mod: 95

## 2024-07-10 ASSESSMENT — PAIN SCALES - GENERAL: PAINLEVEL: EXTREME PAIN (9)

## 2024-07-10 NOTE — PROGRESS NOTES
"Video-Visit Details    Type of service:  Video Visit    Video Start Time: 1:26 pm  Video End Time:  1:44 pm    Originating Location (pt. Location): Home    Distant Location (provider location):  Offsite (providers home)     Platform used for Video Visit: [facility]    Nutrition Consultation Note    Reason For Visit: Nutrition Consultation     Kimo Greenwood is a 43 year old presenting today for return nutrition consult.   Pt is interested in laparoscopic sleeve gastrectomy.  Will proceed with MWM at this time per pt/his mother. Did NOT review any surgery dietary guidelines yet.     Pt referred by JERRY Marinelli on January 10, 2023.    Coordination note (if pursuing surgery in future)   Needs baseline labs - Done 1/9/23, would need to be re-done due to being outdated   Needs Psych eval  Needs PCP letter of support  40 lb weight loss from initial  Surgeon meet and greet with Dr. Madrigal  Needs letter of support from therapist  Needs letter of support from psychiatrist       CO-MORBIDITIES OF OBESITY INCLUDE:        1/9/2023     1:43 PM   --   I have the following health issues associated with obesity None of the above     PMH:     Per PA note:  \"Hip OA - needing hip replacement.      LOREN - uses CPAP every night. Followed by PCP      Barretts esophagus - has not had any GERD symptoms recently. Well controlled on PPI.      HTN - well controlled on medications      Degenerative disk disease - causes low back pain. 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 Followed by urology\"    Depression - has therapist/psych, hx of suicide attempt per questionnaire     Speech/Language delay    SUPPORT:      1/9/2023     1:43 PM   Support System Reviewed With Patient   Who do you have in your support network that can be available to help you for the first 2 weeks after surgery? agency   Who can you count on for support throughout your weight loss surgery journey? fully supportive       ANTHROPOMETRICS:  Consult " "weight 1/9/23: 407 lbs with BMI 55.28    Estimated body mass index is 47.51 kg/m  as calculated from the following:    Height as of this encounter: 1.854 m (6' 0.99\").    Weight as of this encounter: 163.3 kg (360 lb).     Current weight: 360 lbs, pt report (down 4 lbs since last RD visit)    Goal weight for hip replacement per Nya Camarena's note: 330 lbs         No data to display                    1/9/2023     1:43 PM   Weight Loss Questions Reviewed With Patient   How long have you been overweight? Since early childhood     MEDICATIONS FOR WEIGHT LOSS:  Zepbound - Increased to 15 mg dose per PA note 6/27/24, no side effects per patient    Hx   Saxenda -  Had been denied at first but was able to get with appeal per pt  Topiramate (ineffective)    SUPPLEMENT INFORMATION:  Vitamin D3 5,000 international unit(s)/day  B complex  Iron -  due to anemia per pt    Labs 6/19/23  Vit D WNL  TSH  WNL    Labs 6/11/24:  LDL slightly elevated    Hx of Vit D def    NUTRITION HISTORY:  Pt present today with his mom and legal guardian, Belen.     Patient is considering Bariatric surgery. Hoping to lose weight for hip replacement with goal of 330 lbs. Weight gain due to changes in diet and decrease in activity.     Wants to work on MWM first and consider surgical approach down the line as appropriate.     Worked with a RD a long time ago at [facility] for weight loss.     Mobility is very limited.   Moving into an apartment with a pool next month.     Per PA note (not yet finalized at time of writing this)    Eating 2 meals a day, with snacks. Trying to decrease pop. Meals are provided by group home. However, will go to the store to get candy, chip, cookies, crackers, ice cream, and pudding, and soda. Has increased hunger. Struggles with getting full. From exam, however [place] have increased hunger,   Breakfast - cereal with milk   Lunch - provided by group home  Dinner - pizza   Snacks - cookies, animal crackers   Pop - 1 liter of " regular pop at a time.      Activity - Hip pain limits activity. Will try to get up and walk around as much as possible. Cannot walk more then 50 yards at a time.     Please see previous RD notes for more detail    Feb 2024:  Diarrhea resolved per pt.    Saw Nya Camarena 2/22/24. Plan to stop Wegovy and transition to Zepbound. Zepbound will be coming tomorrow. Last Wegovy shot was last Wednesday.     MK also placed a pool therapy referral.     Switched pop to fresca (drinking some mtn dew still too)- limiting to 2L/week.     Eating 2 meals/day, occ snacking.   Working on smaller portions and more protein.   Main proteins lately: beef  Fruit - got a  recently so has been doing smoothies. Will do sherbet, apple juice and fruit.   Vegetables - corn a few times at moms house.   Working on making better choices when at store    Eating on salad while on visit today.     PA: limited by knee/hip pain. Does have pool in apartment building      April 2024:  Pt reports he got Gout last month- was eating a lot of steak/worley.  Went to urgent care - got steroids to help. Uric acid levels were high per pt/per chart review.    Eating 2 meals/day and a snack.     Hydration: working on less pop - has been doing fresca more often over other options. Also doing water with flavor packs    July 2024:  Doing pretty good with things he is eating. Hasn't had gout symptoms for a while. Not eating a lot of vegetables but does eat salads.    Hydration: Was doing well with pop consumption but has drinking more lately. Lately having 6 pack of 24 oz bottles and having 1 per day. Stopped drinking chocolate milk. Not drinking much water at all currently. Drinking fresca, 1-2 cans/day. Coffee and fruit smoothies.    PA: Doing pool therapy once/week. Next month would like to shoot for twice/week.    Previous goals:  Aim for steak/worley 1-2x/week to help with Gout symptoms - Not as relevant anymore, hasn't had gout symptoms for a while per  patient report  Aim for 1 bottle of water/day  Continue aiming for 1 quart of chocolate milk every other week - Met  Aim to limit pop to 4 bottles or less/week (1 L bottle + 32 oz fountain pop or less) - Not met, working towards this  Aim to increase vegetable intake as able (broccoli, carrots, salad, bell peppers) - in progress    Additional information:  Disabled     Single         1/9/2023     1:43 PM   Recall Diet Questions Reviewed With Patient   Describe what you typically consume for lunch (typical or most recent) candy soda(8)cans to 10cans a day   Describe what you typically consume for supper (typical or most recent) pizza   How many ounces of water, or other low calorie drinks, do you drink daily (8 oz=1 glass)? 0 oz   How many ounces of caffeine (coffee, tea, pop) do you drink daily (8 oz=1 glass)? 24 oz   How many ounces of milk do you drink daily (8 oz=1 glass) 0 oz   How often do you drink alcohol? Never           1/9/2023     1:43 PM   Eating Habits   Do you have any dietary restrictions? No   Do you currently binge eat (eat a large amount of food in a short time)? Yes   Are you an emotional eater? Yes   Do you get up to eat after falling asleep? Yes           1/9/2023     1:43 PM   Dining Out History Reviewed With Patient   How often do you dine out? Never.   Where do you dine out? (select all that apply) fast food chains   What types of food do you order when you dine out? hamburger       EXERCISE:        1/9/2023     1:43 PM   --   How often do you exercise? Never   What keeps you from being more active? Pain       NUTRITION DIAGNOSIS:  Obesity r/t long history of positive energy balance aeb BMI >30 kg/m2.    INTERVENTION:  Reviewed and modified goals  Answered pt questions  Coordination of care   Nutrition education   AVS and handouts via [employer]          1/9/2023     1:43 PM   Questions Reviewed With Patient   How ready are you to make changes regarding your weight? Number 1 = Not ready at all  to make changes up to 10 = very ready. 1   How confident are you that you can change? 1 = Not confident that you will be successful making changes up to 10 = very confident. 1     Expected Engagement: fair-good (good engagement during appts however pt rated confidence as a 1 per questionnaire)     GOALS:  Aim to reduce soda intake to 2L per week  Aim for vegetable intake at least three times/week (broccoli, carrots, salad, bell peppers, celery)  Aim for 1 bottle of water/day  Aim to do pool therapy twice/week when able    Meal examples with vegetables:   - loaded baked potato with broccoli and cheese on top. Could add a little worley bit as well     - stuffed peppers     Dietary Fiber  Soluble fiber is recommended for both diarrhea/constipation.  Foods rich in insoluble fiber are best for constipation but not diarrhea.     Insoluble fiber adds bulk to your stool and helps food pass more quickly through the stomach and intestines. It can have a laxative effect  Soluble fiber attracts water and forms a gel-like substance with food as it s digested. Aids in diarrhea by helping pull water.     Insoluble fiber examples: wheat bran, brown rice, flaxseed, brady seed, cauliflower, zuchinni, green beans, dark leafy greens, carrots, beets, radishes, fruit skins, intact whole grains, beans/peas    Soluble fiber examples: brussel sprouts, oats, beans, peas, apples, pears, berries, carrots, sweet potatoes, oranges, nuts, seeds    *Note: some food are rich in both soluble and insoluble fiber      Fermented foods   Yogurt with live bacterial cultures   Shaniceichi   Apple cider Vinegar   Miso   Natto   Kvass   Pickles   Olives   Kefir   Yrn Pereyra   Probiotics: lactobacillus or acidophilus    Protein shake examples:  Premier Protein (160 Calories, 30 g protein)  Boost/Ensure Max (160 calories, 30 gm protein)   Fairlife Core Power (170 calories, 26 gm protein)  Aldi's Elevation Protein Shake (160 calories, 30 gm protein)    Equate Protein Shake (160 calories, 30 gm protein)  Slim Fast Advanced Nutrition (180 Calories, 20 g protein)  Muscle Milk, lactose-free, 17 oz bottle (210 Calories, 30 g protein)    Ideas to bring on the go:   - Nutrigrain bars   - Almonds   - Tuna    - Snack packs   - Dried fruit (try to find no sugar added)   - Yogurt   - Yogurt covered nuts     Meal ideas or components discussed: honey mustard chicken, baked beans, asparagus, broccoli, chili with beans and beef, white chicken chili with onions/peppers, fish - walleye, salmon, etc., Barilla Protein + pasta, loaded baked potatoe, sweet potatoe breakfast skillet with eggs, tuna salad, tuna casserole     RESOURCES:     Plate method can be used for general guidance on balanced meals/portion sizes (see link below for picture/more information)                 Make 1/2 your plate non starchy vegetables (cauliflower, broccoli, asparagus, [place] sprouts, lettuce, carrots, for example).                5+ oz lean protein sources (salmon/skinless chicken/turkey breast/pork loin/lean cuts of beef/ or non-animal protein such as black, kidney or kuo beans, tofu/edamame/tempeh)     (Note: 3 oz = deck of cards size)                 ~1 cup carbohydrate choices such as whole grain starches or starchy vegetables or fruit. For example: quinoa, brown rice, barley, potatoes, sweet potatoes, winter squash, peas, corn, or fruit.               Choose ~0-2 added fat servings at a meal (avocados, nut butters, nuts or seeds, olive oil, vegetable oils).    The Plate Method:  https://www.cdc.gov/diabetes/images/managing/Diabetes-Manage-Eat-Well-Plate-Graphic_600px.jpg    Building a Plate  [email or web address]    Protein Sources for Weight Loss  [email or web address]     Carbohydrates  [email or web address]     Mindful Eating  [email or web address]     Summary of Volumetrics Eating Plan  [email or web address]     Seated Exercises for Arms and  Legs (can be done before or after surgery)  [email or web address]    Follow up:    Thursday, August 29th at 2:00 pm    Time spent with patient: 18 minutes.  KAMILLE Wheatley, RD, LD  Clinic #: [phone]

## 2024-07-10 NOTE — TELEPHONE ENCOUNTER
Patient confirmed scheduled appointment:  Date: 7/10/24  Time: 1:30 pm  Visit type: RET MWM Nutrition  Provider: Smita Hsu  Location: virtual  Testing/imaging: n/a  Additional notes: n/a

## 2024-07-10 NOTE — TELEPHONE ENCOUNTER
On 07/01/2024, Mr. Kimo Greenwood had bilateral genicular nerve blocks.  He had approximately 50% or better pain relief lasting only 1 hour after the blocks.  Would not plan for genicular nerve radiofrequency ablations.    Leonid Beasley MD

## 2024-07-10 NOTE — NURSING NOTE
Current patient location: [address]   [facility] [address]    Is the patient currently in the state of [place]? YES    Visit mode:VIDEO    If the visit is dropped, the patient can be reconnected by: VIDEO VISIT: Send to e-mail at: [email or web address]    Will anyone else be joining the visit? NO  (If patient encounters technical issues they should call [phone]    How would you like to obtain your AVS? MyChart    Are changes needed to the allergy or medication list? N/A    Are refills needed on medications prescribed by this physician? NO    Reason for visit: ROSS NAIR

## 2024-07-10 NOTE — LETTER
"7/10/2024       RE: Kimo Greenwood  [address]  [facility] [address]     Dear Colleague,    Thank you for referring your patient, Kimo Greenwood, to the [facility] at [facility]. Please see a copy of my visit note below.    Video-Visit Details    Type of service:  Video Visit    Video Start Time: 1:26 pm  Video End Time:  1:44 pm    Originating Location (pt. Location): Home    Distant Location (provider location):  Offsite (providers home)     Platform used for Video Visit: Holaira    Nutrition Consultation Note    Reason For Visit: Nutrition Consultation     Kimo Greenwood is a 43 year old presenting today for return nutrition consult.   Pt is interested in laparoscopic sleeve gastrectomy.  Will proceed with MWM at this time per pt/his mother. Did NOT review any surgery dietary guidelines yet.     Pt referred by JERRY Marinelli on January 10, 2023.    Coordination note (if pursuing surgery in future)   Needs baseline labs - Done 1/9/23, would need to be re-done due to being outdated   Needs Psych eval  Needs PCP letter of support  40 lb weight loss from initial  Surgeon meet and greet with Dr. Madrigal  Needs letter of support from therapist  Needs letter of support from psychiatrist       CO-MORBIDITIES OF OBESITY INCLUDE:        1/9/2023     1:43 PM   --   I have the following health issues associated with obesity None of the above     PMH:     Per PA note:  \"Hip OA - needing hip replacement.      LOREN - uses CPAP every night. Followed by PCP      Barretts esophagus - has not had any GERD symptoms recently. Well controlled on PPI.      HTN - well controlled on medications      Degenerative disk disease - causes low back pain. 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 Followed by urology\"    Depression - has therapist/psych, hx of suicide attempt per questionnaire     Speech/Language " "delay    SUPPORT:      1/9/2023     1:43 PM   Support System Reviewed With Patient   Who do you have in your support network that can be available to help you for the first 2 weeks after surgery? agency   Who can you count on for support throughout your weight loss surgery journey? fully supportive       ANTHROPOMETRICS:  Consult weight 1/9/23: 407 lbs with BMI 55.28    Estimated body mass index is 47.51 kg/m  as calculated from the following:    Height as of this encounter: 1.854 m (6' 0.99\").    Weight as of this encounter: 163.3 kg (360 lb).     Current weight: 360 lbs, pt report (down 4 lbs since last RD visit)    Goal weight for hip replacement per Nya Camarena's note: 330 lbs         No data to display                    1/9/2023     1:43 PM   Weight Loss Questions Reviewed With Patient   How long have you been overweight? Since early childhood     MEDICATIONS FOR WEIGHT LOSS:  Zepbound - Increased to 15 mg dose per PA note 6/27/24, no side effects per patient    Hx   Saxenda -  Had been denied at first but was able to get with appeal per pt  Topiramate (ineffective)    SUPPLEMENT INFORMATION:  Vitamin D3 5,000 international unit(s)/day  B complex  Iron -  due to anemia per pt    Labs 6/19/23  Vit D WNL  TSH  WNL    Labs 6/11/24:  LDL slightly elevated    Hx of Vit D def    NUTRITION HISTORY:  Pt present today with his mom and legal guardian, Belen.     Patient is considering Bariatric surgery. Hoping to lose weight for hip replacement with goal of 330 lbs. Weight gain due to changes in diet and decrease in activity.     Wants to work on MWM first and consider surgical approach down the line as appropriate.     Worked with a RD a long time ago at [facility] for weight loss.     Mobility is very limited.   Moving into an apartment with a pool next month.     Per PA note (not yet finalized at time of writing this)    Eating 2 meals a day, with snacks. Trying to decrease pop. Meals are provided by group home. However, " will go to the store to get candy, chip, cookies, crackers, ice cream, and pudding, and soda. Has increased hunger. Struggles with getting full. From exam, however Willard have increased hunger,   Breakfast - cereal with milk   Lunch - provided by group home  Dinner - pizza   Snacks - cookies, animal crackers   Pop - 1 liter of regular pop at a time.      Activity - Hip pain limits activity. Will try to get up and walk around as much as possible. Cannot walk more then 50 yards at a time.     Please see previous RD notes for more detail    Feb 2024:  Diarrhea resolved per pt.    Saw Nya Camarena 2/22/24. Plan to stop Wegovy and transition to Zepbound. Zepbound will be coming tomorrow. Last Wegovy shot was last Wednesday.     MK also placed a pool therapy referral.     Switched pop to fresca (drinking some mtn dew still too)- limiting to 2L/week.     Eating 2 meals/day, occ snacking.   Working on smaller portions and more protein.   Main proteins lately: beef  Fruit - got a  recently so has been doing smoothies. Will do sherbet, apple juice and fruit.   Vegetables - corn a few times at [email or web address] house.   Working on making better choices when at store    Eating on salad while on visit today.     PA: limited by knee/hip pain. Does have pool in apartment building      April 2024:  Pt reports he got Gout last month- was eating a lot of steak/worley.  Went to urgent care - got steroids to help. Uric acid levels were high per pt/per chart review.    Eating 2 meals/day and a snack.     Hydration: working on less pop - has been doing fresca more often over other options. Also doing water with flavor packs    July 2024:  Doing pretty good with things he is eating. Hasn't had gout symptoms for a while. Not eating a lot of vegetables but does eat salads.    Hydration: Was doing well with pop consumption but has drinking more lately. Lately having 6 pack of 24 oz bottles and having 1 per day. Stopped drinking chocolate milk. Not  drinking much water at all currently. Drinking fresca, 1-2 cans/day. Coffee and fruit smoothies.    PA: Doing pool therapy once/week. Next month would like to shoot for twice/week.    Previous goals:  Aim for steak/worley 1-2x/week to help with Gout symptoms - Not as relevant anymore, hasn't had gout symptoms for a while per patient report  Aim for 1 bottle of water/day  Continue aiming for 1 quart of chocolate milk every other week - Met  Aim to limit pop to 4 bottles or less/week (1 L bottle + 32 oz fountain pop or less) - Not met, working towards this  Aim to increase vegetable intake as able (broccoli, carrots, salad, bell peppers) - in progress    Additional information:  Disabled     Single         1/9/2023     1:43 PM   Recall Diet Questions Reviewed With Patient   Describe what you typically consume for lunch (typical or most recent) candy soda(8)cans to 10cans a day   Describe what you typically consume for supper (typical or most recent) pizza   How many ounces of water, or other low calorie drinks, do you drink daily (8 oz=1 glass)? 0 oz   How many ounces of caffeine (coffee, tea, pop) do you drink daily (8 oz=1 glass)? 24 oz   How many ounces of milk do you drink daily (8 oz=1 glass) 0 oz   How often do you drink alcohol? Never           1/9/2023     1:43 PM   Eating Habits   Do you have any dietary restrictions? No   Do you currently binge eat (eat a large amount of food in a short time)? Yes   Are you an emotional eater? Yes   Do you get up to eat after falling asleep? Yes           1/9/2023     1:43 PM   Dining Out History Reviewed With Patient   How often do you dine out? Never.   Where do you dine out? (select all that apply) fast food chains   What types of food do you order when you dine out? hamburger       EXERCISE:        1/9/2023     1:43 PM   --   How often do you exercise? Never   What keeps you from being more active? Pain       NUTRITION DIAGNOSIS:  Obesity r/t long history of positive  energy balance aeb BMI >30 kg/m2.    INTERVENTION:  Reviewed and modified goals  Answered pt questions  Coordination of care   Nutrition education   AVS and handouts via [employer]          1/9/2023     1:43 PM   Questions Reviewed With Patient   How ready are you to make changes regarding your weight? Number 1 = Not ready at all to make changes up to 10 = very ready. 1   How confident are you that you can change? 1 = Not confident that you will be successful making changes up to 10 = very confident. 1     Expected Engagement: fair-good (good engagement during appts however pt rated confidence as a 1 per questionnaire)     GOALS:  Aim to reduce soda intake to 2L per week  Aim for vegetable intake at least three times/week (broccoli, carrots, salad, bell peppers, celery)  Aim for 1 bottle of water/day  Aim to do pool therapy twice/week when able    Meal examples with vegetables:   - loaded baked potato with broccoli and cheese on top. Could add a little worley bit as well     - stuffed peppers     Dietary Fiber  Soluble fiber is recommended for both diarrhea/constipation.  Foods rich in insoluble fiber are best for constipation but not diarrhea.     Insoluble fiber adds bulk to your stool and helps food pass more quickly through the stomach and intestines. It can have a laxative effect  Soluble fiber attracts water and forms a gel-like substance with food as it s digested. Aids in diarrhea by helping pull water.     Insoluble fiber examples: wheat bran, brown rice, flaxseed, brady seed, cauliflower, zuchinni, green beans, dark leafy greens, carrots, beets, radishes, fruit skins, intact whole grains, beans/peas    Soluble fiber examples: brussel sprouts, oats, beans, peas, apples, pears, berries, carrots, sweet potatoes, oranges, nuts, seeds    *Note: some food are rich in both soluble and insoluble fiber      Fermented foods   Yogurt with live bacterial cultures   Marly clark  Vinegar   Miso   Natto   Kvass   Pickles   Olives   Kefir   Yrn Lizarragajuliaut   Probiotics: lactobacillus or acidophilus    Protein shake examples:  Premier Protein (160 Calories, 30 g protein)  Boost/Ensure Max (160 calories, 30 gm protein)   Fairlife Core Power (170 calories, 26 gm protein)  Aldi's Elevation Protein Shake (160 calories, 30 gm protein)   Equate Protein Shake (160 calories, 30 gm protein)  Slim Fast Advanced Nutrition (180 Calories, 20 g protein)  Muscle Milk, lactose-free, 17 oz bottle (210 Calories, 30 g protein)    Ideas to bring on the go:   - Nutrigrain bars   - Almonds   - Tuna    - Snack packs   - Dried fruit (try to find no sugar added)   - Yogurt   - Yogurt covered nuts     Meal ideas or components discussed: honey mustard chicken, baked beans, asparagus, broccoli, chili with beans and beef, white chicken chili with onions/peppers, fish - walleye, salmon, etc., Barilla Protein + pasta, loaded baked potatoe, sweet potatoe breakfast skillet with eggs, tuna salad, tuna casserole     RESOURCES:     Plate method can be used for general guidance on balanced meals/portion sizes (see link below for picture/more information)                 Make 1/2 your plate non starchy vegetables (cauliflower, broccoli, asparagus, [place] sprouts, lettuce, carrots, for example).                5+ oz lean protein sources (salmon/skinless chicken/turkey breast/pork loin/lean cuts of beef/ or non-animal protein such as black, kidney or kuo beans, tofu/edamame/tempeh)     (Note: 3 oz = deck of cards size)                 ~1 cup carbohydrate choices such as whole grain starches or starchy vegetables or fruit. For example: quinoa, brown rice, barley, potatoes, sweet potatoes, winter squash, peas, corn, or fruit.               Choose ~0-2 added fat servings at a meal (avocados, nut butters, nuts or seeds, olive oil, vegetable oils).    The Plate  Method:  https://www.cdc.gov/diabetes/images/managing/Diabetes-Manage-Eat-Well-Plate-Graphic_600px.jpg    Building a Plate  [email or web address]    Protein Sources for Weight Loss  [email or web address]     Carbohydrates  [email or web address]     Mindful Eating  [email or web address]     Summary of Volumetrics Eating Plan  [email or web address]     Seated Exercises for Arms and Legs (can be done before or after surgery)  [email or web address]    Follow up:    Thursday, August 29th at 2:00 pm    Time spent with patient: 18 minutes.  KAMILLE Wheatley, RD, LD  Clinic #: [phone]

## 2024-07-10 NOTE — PATIENT INSTRUCTIONS
Robert Lamar,    Please follow-up with dietitian on Thursday, August 29th at 2:00 pm.    Thank you,    Smita Hsu, MPS, RD, LD  If you need to schedule or reschedule, please call [phone].    Nutrition Goals:    Aim to reduce soda intake to 2L per week  Aim for vegetable intake at least three times/week (broccoli, carrots, salad, bell peppers, celery)  Aim for 1 bottle of water/day  Aim to do pool therapy twice/week when able    Meal examples with vegetables:   - loaded baked potato with broccoli and cheese on top. Could add a little worley bit as well     - stuffed peppers     Dietary Fiber  Soluble fiber is recommended for both diarrhea/constipation.  Foods rich in insoluble fiber are best for constipation but not diarrhea.     Insoluble fiber adds bulk to your stool and helps food pass more quickly through the stomach and intestines. It can have a laxative effect  Soluble fiber attracts water and forms a gel-like substance with food as it s digested. Aids in diarrhea by helping pull water.     Insoluble fiber examples: wheat bran, brown rice, flaxseed, brady seed, cauliflower, zuchinni, green beans, dark leafy greens, carrots, beets, radishes, fruit skins, intact whole grains, beans/peas    Soluble fiber examples: brussel sprouts, oats, beans, peas, apples, pears, berries, carrots, sweet potatoes, oranges, nuts, seeds    *Note: some food are rich in both soluble and insoluble fiber      Fermented foods   Yogurt with live bacterial cultures   Shaniceichi   Apple cider Vinegar   Miso   Natto   Kvass   Pickles   Olives   Kefir   Aftabmbdajaa   Roddy   Probiotics: lactobacillus or acidophilus    Protein shake examples:  Premier Protein (160 Calories, 30 g protein)  Boost/Ensure Max (160 calories, 30 gm protein)   Fairlife Core Power (170 calories, 26 gm protein)  Aldi's Elevation Protein Shake (160 calories, 30 gm protein)   Equate Protein Shake (160 calories, 30 gm protein)  Slim Fast Advanced Nutrition (180 Calories,  20 g protein)  Muscle Milk, lactose-free, 17 oz bottle (210 Calories, 30 g protein)    Ideas to bring on the go:   - Nutrigrain bars   - Almonds   - Tuna    - Snack packs   - Dried fruit (try to find no sugar added)   - Yogurt   - Yogurt covered nuts     Meal ideas or components discussed: honey mustard chicken, baked beans, asparagus, broccoli, chili with beans and beef, white chicken chili with onions/peppers, fish - walleye, salmon, etc., Barilla Protein + pasta, loaded baked potatoe, sweet potatoe breakfast skillet with eggs, tuna salad, tuna casserole     RESOURCES:     Plate method can be used for general guidance on balanced meals/portion sizes (see link below for picture/more information)                 Make 1/2 your plate non starchy vegetables (cauliflower, broccoli, asparagus, [place] sprouts, lettuce, carrots, for example).                5+ oz lean protein sources (salmon/skinless chicken/turkey breast/pork loin/lean cuts of beef/ or non-animal protein such as black, kidney or kuo beans, tofu/edamame/tempeh)     (Note: 3 oz = deck of cards size)                 ~1 cup carbohydrate choices such as whole grain starches or starchy vegetables or fruit. For example: quinoa, brown rice, barley, potatoes, sweet potatoes, winter squash, peas, corn, or fruit.               Choose ~0-2 added fat servings at a meal (avocados, nut butters, nuts or seeds, olive oil, vegetable oils).    The Plate Method:  https://www.cdc.gov/diabetes/images/managing/Diabetes-Manage-Eat-Well-Plate-Graphic_600px.jpg    Building a Plate  [email or web address]    Protein Sources for Weight Loss  [email or web address]     Carbohydrates  [email or web address]     Mindful Eating  [email or web address]     Summary of Volumetrics Eating Plan  [email or web address]     Seated Exercises for Arms and Legs (can be done before or after surgery)  [email or web address]

## 2024-07-12 DIAGNOSIS — E66.813 CLASS 3 SEVERE OBESITY DUE TO EXCESS CALORIES WITH SERIOUS COMORBIDITY AND BODY MASS INDEX (BMI) OF 50.0 TO 59.9 IN ADULT (H): ICD-10-CM

## 2024-07-12 DIAGNOSIS — E66.01 CLASS 3 SEVERE OBESITY DUE TO EXCESS CALORIES WITH SERIOUS COMORBIDITY AND BODY MASS INDEX (BMI) OF 50.0 TO 59.9 IN ADULT (H): ICD-10-CM

## 2024-07-18 NOTE — TELEPHONE ENCOUNTER
"Phone call to patient to relay information from Dr. Beasley. Spoke with Crys, patient's care coordinator. Explained the ablation is not recommended based on patient's response to the blocks. \"I fully expected that. When I was helping him complete the diary and having him walk, he was gaines and upset. Not sure if that is fully accurate as to what his pain truly was, but it is what he told me.\" Encouraged them to follow up with Dr. Yeo for further recommendations. Stated understanding and appreciation for call.   "

## 2024-07-22 ENCOUNTER — OFFICE VISIT (OUTPATIENT)
Dept: FAMILY MEDICINE | Facility: CLINIC | Age: 44
End: 2024-07-22
Payer: COMMERCIAL

## 2024-07-22 VITALS
DIASTOLIC BLOOD PRESSURE: 74 MMHG | OXYGEN SATURATION: 96 % | TEMPERATURE: 98.2 F | RESPIRATION RATE: 20 BRPM | HEIGHT: 73 IN | SYSTOLIC BLOOD PRESSURE: 117 MMHG | BODY MASS INDEX: 41.75 KG/M2 | WEIGHT: 315 LBS | HEART RATE: 82 BPM

## 2024-07-22 DIAGNOSIS — R53.82 CHRONIC FATIGUE: Primary | ICD-10-CM

## 2024-07-22 PROCEDURE — 99213 OFFICE O/P EST LOW 20 MIN: CPT | Mod: 25 | Performed by: FAMILY MEDICINE

## 2024-07-22 PROCEDURE — 96372 THER/PROPH/DIAG INJ SC/IM: CPT | Performed by: FAMILY MEDICINE

## 2024-07-22 RX ORDER — CYANOCOBALAMIN 1000 UG/ML
1000 INJECTION, SOLUTION INTRAMUSCULAR; SUBCUTANEOUS
Status: ACTIVE | OUTPATIENT
Start: 2024-07-22 | End: 2025-07-17

## 2024-07-22 RX ADMIN — CYANOCOBALAMIN 1000 MCG: 1000 INJECTION, SOLUTION INTRAMUSCULAR; SUBCUTANEOUS at 13:26

## 2024-07-22 ASSESSMENT — ANXIETY QUESTIONNAIRES
2. NOT BEING ABLE TO STOP OR CONTROL WORRYING: NOT AT ALL
IF YOU CHECKED OFF ANY PROBLEMS ON THIS QUESTIONNAIRE, HOW DIFFICULT HAVE THESE PROBLEMS MADE IT FOR YOU TO DO YOUR WORK, TAKE CARE OF THINGS AT HOME, OR GET ALONG WITH OTHER PEOPLE: NOT DIFFICULT AT ALL
GAD7 TOTAL SCORE: 0
GAD7 TOTAL SCORE: 0
5. BEING SO RESTLESS THAT IT IS HARD TO SIT STILL: NOT AT ALL
GAD7 TOTAL SCORE: 0
8. IF YOU CHECKED OFF ANY PROBLEMS, HOW DIFFICULT HAVE THESE MADE IT FOR YOU TO DO YOUR WORK, TAKE CARE OF THINGS AT HOME, OR GET ALONG WITH OTHER PEOPLE?: NOT DIFFICULT AT ALL
6. BECOMING EASILY ANNOYED OR IRRITABLE: NOT AT ALL
7. FEELING AFRAID AS IF SOMETHING AWFUL MIGHT HAPPEN: NOT AT ALL
4. TROUBLE RELAXING: NOT AT ALL
7. FEELING AFRAID AS IF SOMETHING AWFUL MIGHT HAPPEN: NOT AT ALL
3. WORRYING TOO MUCH ABOUT DIFFERENT THINGS: NOT AT ALL
1. FEELING NERVOUS, ANXIOUS, OR ON EDGE: NOT AT ALL

## 2024-07-22 ASSESSMENT — PATIENT HEALTH QUESTIONNAIRE - PHQ9
SUM OF ALL RESPONSES TO PHQ QUESTIONS 1-9: 9
SUM OF ALL RESPONSES TO PHQ QUESTIONS 1-9: 9
10. IF YOU CHECKED OFF ANY PROBLEMS, HOW DIFFICULT HAVE THESE PROBLEMS MADE IT FOR YOU TO DO YOUR WORK, TAKE CARE OF THINGS AT HOME, OR GET ALONG WITH OTHER PEOPLE: NOT DIFFICULT AT ALL

## 2024-07-22 ASSESSMENT — ENCOUNTER SYMPTOMS: FATIGUE: 1

## 2024-07-22 NOTE — PROGRESS NOTES
"  Assessment & Plan     Chronic fatigue  Okay to trial B12, recheck labs and repeat injection in 1 month.  - cyanocobalamin injection 1,000 mcg  - Vitamin B12            Nicotine/Tobacco Cessation  He reports that he has been smoking vaping device and cigarettes. He has a 1 pack-year smoking history. He has been exposed to tobacco smoke. His smokeless tobacco use includes chew.  Nicotine/Tobacco Cessation Plan  Pharmacotherapies : varenicline (Chantix)        Carline Lamar is a 43 year old, presenting for the following health issues:  Smoking Cessation (Follow-up smoking cessation) and Fatigue (Follow-up fatigue)      7/22/2024    12:54 PM   Additional Questions   Roomed by Kaitlin Berman   Accompanied by care coordinator=Crys     History of Present Illness       Reason for visit:  Follow up fatigue, B-12 SHOT OPTION , SMOKING CESSATION    He eats 0-1 servings of fruits and vegetables daily.He consumes 5 sweetened beverage(s) daily.He exercises with enough effort to increase his heart rate 9 or less minutes per day.  He is missing 1 dose(s) of medications per week.     Patient presents for fatigue follow-up and would like to trial B12 injections.                Objective    /74 (BP Location: Right arm, Patient Position: Chair, Cuff Size: Adult Large)   Pulse 82   Temp 98.2  F (36.8  C) (Oral)   Resp 20   Ht 1.854 m (6' 1\")   Wt (!) 166.5 kg (367 lb)   SpO2 96%   BMI 48.42 kg/m    Body mass index is 48.42 kg/m .  Physical Exam  Vitals reviewed.   Constitutional:       Appearance: He is not ill-appearing.   Cardiovascular:      Rate and Rhythm: Normal rate and regular rhythm.   Pulmonary:      Effort: Pulmonary effort is normal.                    Signed Electronically by: Ciro Love MD    "

## 2024-07-24 ENCOUNTER — TELEPHONE (OUTPATIENT)
Dept: ENDOCRINOLOGY | Facility: CLINIC | Age: 44
End: 2024-07-24
Payer: COMMERCIAL

## 2024-07-24 DIAGNOSIS — E66.01 CLASS 3 SEVERE OBESITY DUE TO EXCESS CALORIES WITH SERIOUS COMORBIDITY AND BODY MASS INDEX (BMI) OF 50.0 TO 59.9 IN ADULT (H): Primary | ICD-10-CM

## 2024-07-24 DIAGNOSIS — E66.813 CLASS 3 SEVERE OBESITY DUE TO EXCESS CALORIES WITH SERIOUS COMORBIDITY AND BODY MASS INDEX (BMI) OF 50.0 TO 59.9 IN ADULT (H): Primary | ICD-10-CM

## 2024-07-24 NOTE — TELEPHONE ENCOUNTER
Pt. thinks the 15 mg Zepbound might be too strong because Pt. feels he has no desire to eat at all. Pt. has also has gained weight and says when he does eat he feels too full like he is going to vomit. Please contact care coordinator. Care  coordinator had a 15mg Zepbound waiting to be picked up at [facility] in [place] [phone] Caryn and general number for [facility] is [phone] and Coordinator will need to get a 12.5 mg dosage of Zepbound called into the pharmacy please.

## 2024-07-29 DIAGNOSIS — K59.01 SLOW TRANSIT CONSTIPATION: ICD-10-CM

## 2024-07-29 RX ORDER — CALCIUM POLYCARBOPHIL 625 MG/1
TABLET, FILM COATED ORAL
Qty: 120 TABLET | Refills: 3 | Status: SHIPPED | OUTPATIENT
Start: 2024-07-29

## 2024-07-31 NOTE — TELEPHONE ENCOUNTER
Pt with unsatisfactory pain scores -     Closing encounter    Love LÓPEZ RN Care Coordinator  [facility]

## 2024-08-01 ENCOUNTER — TRANSFERRED RECORDS (OUTPATIENT)
Dept: HEALTH INFORMATION MANAGEMENT | Facility: CLINIC | Age: 44
End: 2024-08-01
Payer: COMMERCIAL

## 2024-08-06 ENCOUNTER — TELEPHONE (OUTPATIENT)
Dept: ENDOCRINOLOGY | Facility: CLINIC | Age: 44
End: 2024-08-06
Payer: COMMERCIAL

## 2024-08-06 NOTE — TELEPHONE ENCOUNTER
Patient confirmed scheduled appointment:  Date: 9/4/24  Time: 11:30 am  Visit type: RET MWM Nutrition  Provider: Aydee Monahan  Location: virtual  Testing/imaging: n/a  Additional notes: n/a

## 2024-08-10 ENCOUNTER — HEALTH MAINTENANCE LETTER (OUTPATIENT)
Age: 44
End: 2024-08-10

## 2024-08-26 ENCOUNTER — TRANSFERRED RECORDS (OUTPATIENT)
Dept: HEALTH INFORMATION MANAGEMENT | Facility: CLINIC | Age: 44
End: 2024-08-26
Payer: COMMERCIAL

## 2024-08-28 ENCOUNTER — LAB (OUTPATIENT)
Dept: LAB | Facility: CLINIC | Age: 44
End: 2024-08-28
Payer: COMMERCIAL

## 2024-08-28 ENCOUNTER — TELEPHONE (OUTPATIENT)
Dept: ENDOCRINOLOGY | Facility: CLINIC | Age: 44
End: 2024-08-28

## 2024-08-28 DIAGNOSIS — D72.828 NEUTROPHILIA: ICD-10-CM

## 2024-08-28 DIAGNOSIS — R53.82 CHRONIC FATIGUE: ICD-10-CM

## 2024-08-28 LAB
BASOPHILS # BLD AUTO: 0 10E3/UL (ref 0–0.2)
BASOPHILS NFR BLD AUTO: 0 %
EOSINOPHIL # BLD AUTO: 0.2 10E3/UL (ref 0–0.7)
EOSINOPHIL NFR BLD AUTO: 2 %
ERYTHROCYTE [DISTWIDTH] IN BLOOD BY AUTOMATED COUNT: 13.4 % (ref 10–15)
HCT VFR BLD AUTO: 39 % (ref 40–53)
HGB BLD-MCNC: 13.3 G/DL (ref 13.3–17.7)
IMM GRANULOCYTES # BLD: 0 10E3/UL
IMM GRANULOCYTES NFR BLD: 0 %
LYMPHOCYTES # BLD AUTO: 2.2 10E3/UL (ref 0.8–5.3)
LYMPHOCYTES NFR BLD AUTO: 21 %
MCH RBC QN AUTO: 29 PG (ref 26.5–33)
MCHC RBC AUTO-ENTMCNC: 34.1 G/DL (ref 31.5–36.5)
MCV RBC AUTO: 85 FL (ref 78–100)
MONOCYTES # BLD AUTO: 0.5 10E3/UL (ref 0–1.3)
MONOCYTES NFR BLD AUTO: 5 %
NEUTROPHILS # BLD AUTO: 7.6 10E3/UL (ref 1.6–8.3)
NEUTROPHILS NFR BLD AUTO: 72 %
PLATELET # BLD AUTO: 252 10E3/UL (ref 150–450)
RBC # BLD AUTO: 4.58 10E6/UL (ref 4.4–5.9)
VIT B12 SERPL-MCNC: 475 PG/ML (ref 232–1245)
WBC # BLD AUTO: 10.6 10E3/UL (ref 4–11)

## 2024-08-28 PROCEDURE — 36415 COLL VENOUS BLD VENIPUNCTURE: CPT

## 2024-08-28 PROCEDURE — 82607 VITAMIN B-12: CPT

## 2024-08-28 PROCEDURE — 85025 COMPLETE CBC W/AUTO DIFF WBC: CPT

## 2024-08-28 NOTE — TELEPHONE ENCOUNTER
General Call    Contacts       Contact Date/Time Type Contact Phone/Fax    08/28/2024 02:24 PM CDT Phone (Incoming) Willard Greenwood (Self) [phone] (M)          Reason for Call:  please      [phone] for Jevone at group home to discuss ongoing problems with Wegovy for renewal    Urgent for renewal.as well     Could we send this information to you in [employer] or would you prefer to receive a phone call?:   Patient would prefer a phone call   Okay to leave a detailed message?: Yes at Cell number on file:    Telephone Information:   Mobile [phone]

## 2024-08-28 NOTE — TELEPHONE ENCOUNTER
Last Office Visit : 6/27/2024  [facility] [place]      Future Office visit:     10/10/2024 2:00 PM (30 min)  [place]   Arrive by:  1:45 PM   RETURN WEIGHT MANAGEMENT   [facility] ([facility])   Nya Lara PA-C     ----------------------      Encounter details:   Reason for Call:  please call [phone] for Dio at group home to discuss ongoing problems with Wegovy for renewal     Urgent for renewal as well     Pt is not on Wegovy, is on Zepbound - routed to WM team to follow-up with Dio/patient

## 2024-09-03 DIAGNOSIS — E66.813 CLASS 3 SEVERE OBESITY DUE TO EXCESS CALORIES WITH SERIOUS COMORBIDITY AND BODY MASS INDEX (BMI) OF 50.0 TO 59.9 IN ADULT (H): ICD-10-CM

## 2024-09-03 DIAGNOSIS — E66.01 CLASS 3 SEVERE OBESITY DUE TO EXCESS CALORIES WITH SERIOUS COMORBIDITY AND BODY MASS INDEX (BMI) OF 50.0 TO 59.9 IN ADULT (H): ICD-10-CM

## 2024-09-03 NOTE — TELEPHONE ENCOUNTER
"  tirzepatide-Weight Management (ZEPBOUND) 12.5 MG/0.5ML prefilled pen      Last Written Prescription Date:  7/25/24  Last Fill Quantity: 2 ml ,   # refills: 0  Last Office Visit : 6/27/24  Future Office visit:  10/10/24    Routing refill request to provider for review/approval because:Patient has both 12.5 and 15 MG weekly active on medication list. Per 7/24/24 phone call, zepbound 12.5 mg sent as \"patient thought 15 MG might be too strong.\" Clarify if continue at same dose of 12.5 Mg  and thanks     tirzepatide-Weight Management (ZEPBOUND) 12.5 MG/0.5ML prefilled pen    Tirzepatide-Weight Management (ZEPBOUND) 15 MG/0.5ML prefilled pen2 mL46/27/2024  "

## 2024-09-04 ENCOUNTER — APPOINTMENT (OUTPATIENT)
Dept: URBAN - METROPOLITAN AREA CLINIC 253 | Age: 44
Setting detail: DERMATOLOGY
End: 2024-09-05

## 2024-09-04 ENCOUNTER — TELEPHONE (OUTPATIENT)
Dept: ENDOCRINOLOGY | Facility: CLINIC | Age: 44
End: 2024-09-04

## 2024-09-04 VITALS — WEIGHT: 315 LBS | RESPIRATION RATE: 14 BRPM | HEIGHT: 72 IN

## 2024-09-04 DIAGNOSIS — E66.01 CLASS 3 SEVERE OBESITY DUE TO EXCESS CALORIES WITH SERIOUS COMORBIDITY AND BODY MASS INDEX (BMI) OF 50.0 TO 59.9 IN ADULT (H): ICD-10-CM

## 2024-09-04 DIAGNOSIS — L91.8 OTHER HYPERTROPHIC DISORDERS OF THE SKIN: ICD-10-CM

## 2024-09-04 DIAGNOSIS — L21.8 OTHER SEBORRHEIC DERMATITIS: ICD-10-CM

## 2024-09-04 DIAGNOSIS — E66.813 CLASS 3 SEVERE OBESITY DUE TO EXCESS CALORIES WITH SERIOUS COMORBIDITY AND BODY MASS INDEX (BMI) OF 50.0 TO 59.9 IN ADULT (H): ICD-10-CM

## 2024-09-04 PROCEDURE — OTHER PRESCRIPTION: OTHER

## 2024-09-04 PROCEDURE — 99214 OFFICE O/P EST MOD 30 MIN: CPT

## 2024-09-04 PROCEDURE — OTHER MIPS QUALITY: OTHER

## 2024-09-04 PROCEDURE — OTHER REASSURANCE: OTHER

## 2024-09-04 PROCEDURE — OTHER COUNSELING: OTHER

## 2024-09-04 RX ORDER — HYDROCORTISONE 25 MG/G
2.5% CREAM TOPICAL BID
Qty: 30 | Refills: 2 | Status: ERX | COMMUNITY
Start: 2024-09-04

## 2024-09-04 RX ORDER — KETOCONAZOLE 20 MG/ML
2% SHAMPOO, SUSPENSION TOPICAL
Qty: 120 | Refills: 2 | Status: ERX | COMMUNITY
Start: 2024-09-04

## 2024-09-04 RX ORDER — KETOCONAZOLE 20 MG/G
2% CREAM TOPICAL BID
Qty: 30 | Refills: 2 | Status: ERX | COMMUNITY
Start: 2024-09-04

## 2024-09-04 ASSESSMENT — LOCATION DETAILED DESCRIPTION DERM
LOCATION DETAILED: LEFT CENTRAL MALAR CHEEK
LOCATION DETAILED: RIGHT CENTRAL MALAR CHEEK
LOCATION DETAILED: LEFT SUPERIOR UPPER BACK

## 2024-09-04 ASSESSMENT — LOCATION ZONE DERM
LOCATION ZONE: FACE
LOCATION ZONE: TRUNK

## 2024-09-04 ASSESSMENT — LOCATION SIMPLE DESCRIPTION DERM
LOCATION SIMPLE: LEFT CHEEK
LOCATION SIMPLE: RIGHT CHEEK
LOCATION SIMPLE: LEFT UPPER BACK

## 2024-09-04 NOTE — TELEPHONE ENCOUNTER
prudencio is jimmie's care giver and states that the prescription for Zepbound 12.5 mg and has been sent to [facility] in [place] and that pharmacy states that they don't have it so the care giver would like to have the prescription sent to [facility] [address] [place] [address] [phone]. [facility] 1 and cannot hold it. Questions contact Jose at [phone]. Pt. Is going out of town today and needs this prescription ASAP.

## 2024-09-04 NOTE — TELEPHONE ENCOUNTER
tirzepatide-Weight Management (ZEPBOUND) 12.5 MG/0.5ML prefilled pen 2 mL 0 7/25/2024     Remaining refills sent to requested pharmacy. [facility]  Jose informed      Damari Mitchell RN  [facility]/Red Flag Triage & Med Refill Team

## 2024-09-04 NOTE — HPI: RASH
Is This A New Presentation, Or A Follow-Up?: Rash
Additional History: This seems to worsen when he shaves his face. He has not tried any medications for this previously.

## 2024-09-04 NOTE — TELEPHONE ENCOUNTER
General Call      Reason for Call:  pt need clinic to call [facility] about 12.5 [facility]  [phone]    [phone] Crys pts care coordinator

## 2024-09-17 ENCOUNTER — TELEPHONE (OUTPATIENT)
Dept: ENDOCRINOLOGY | Facility: CLINIC | Age: 44
End: 2024-09-17
Payer: COMMERCIAL

## 2024-09-17 NOTE — TELEPHONE ENCOUNTER
Patient confirmed scheduled appointment:  Date: 10/14/2024  Time: 130 pm   Visit type: RET MWM Nutrition   Provider: Aydee Monahan RD  Location: virtual  Testing/imaging: n/a  Additional notes: Pt r/s from 9/30/24

## 2024-09-18 ENCOUNTER — TELEPHONE (OUTPATIENT)
Dept: ENDOCRINOLOGY | Facility: CLINIC | Age: 44
End: 2024-09-18
Payer: COMMERCIAL

## 2024-09-18 DIAGNOSIS — E66.813 CLASS 3 SEVERE OBESITY DUE TO EXCESS CALORIES WITH SERIOUS COMORBIDITY AND BODY MASS INDEX (BMI) OF 50.0 TO 59.9 IN ADULT (H): Primary | ICD-10-CM

## 2024-09-18 DIAGNOSIS — E66.01 CLASS 3 SEVERE OBESITY DUE TO EXCESS CALORIES WITH SERIOUS COMORBIDITY AND BODY MASS INDEX (BMI) OF 50.0 TO 59.9 IN ADULT (H): Primary | ICD-10-CM

## 2024-09-18 NOTE — TELEPHONE ENCOUNTER
General Call    Contacts       Contact Date/Time Type Contact Phone/Fax    09/18/2024 07:13 AM CDT Phone (Incoming) niles matta (Care Coordinator) [phone]          Reason for Call:  please call caregiver to discuss the pool therapy script        Could we send this information to you in [facility] or would you prefer to receive a phone call?:   Patient would prefer a phone call   Okay to leave a detailed message?: Yes at Cell number on file:    Telephone Information:   Mobile [phone]

## 2024-09-18 NOTE — TELEPHONE ENCOUNTER
Patient has been getting pool therapy as ordered by Nya Lara PA-C. Rehab needs new order to continue. Referral for pool therapy faxed to [phone] [facility]

## 2024-09-23 DIAGNOSIS — I10 HYPERTENSION GOAL BP (BLOOD PRESSURE) < 140/90: ICD-10-CM

## 2024-09-23 DIAGNOSIS — E66.813 CLASS 3 SEVERE OBESITY DUE TO EXCESS CALORIES WITH SERIOUS COMORBIDITY AND BODY MASS INDEX (BMI) OF 50.0 TO 59.9 IN ADULT (H): ICD-10-CM

## 2024-09-23 DIAGNOSIS — K29.80 DUODENITIS: ICD-10-CM

## 2024-09-23 DIAGNOSIS — I10 ESSENTIAL HYPERTENSION: ICD-10-CM

## 2024-09-23 DIAGNOSIS — E66.01 CLASS 3 SEVERE OBESITY DUE TO EXCESS CALORIES WITH SERIOUS COMORBIDITY AND BODY MASS INDEX (BMI) OF 50.0 TO 59.9 IN ADULT (H): ICD-10-CM

## 2024-09-23 DIAGNOSIS — K21.9 LPRD (LARYNGOPHARYNGEAL REFLUX DISEASE): ICD-10-CM

## 2024-09-23 RX ORDER — LISINOPRIL 10 MG/1
10 TABLET ORAL EVERY MORNING
Qty: 90 TABLET | Refills: 3 | Status: SHIPPED | OUTPATIENT
Start: 2024-09-23

## 2024-09-23 RX ORDER — HYDROCHLOROTHIAZIDE 25 MG/1
25 TABLET ORAL DAILY
Qty: 28 TABLET | Refills: 2 | Status: SHIPPED | OUTPATIENT
Start: 2024-09-23

## 2024-09-23 RX ORDER — PANTOPRAZOLE SODIUM 40 MG/1
TABLET, DELAYED RELEASE ORAL
Qty: 28 TABLET | Refills: 2 | Status: SHIPPED | OUTPATIENT
Start: 2024-09-23

## 2024-09-24 ENCOUNTER — TRANSFERRED RECORDS (OUTPATIENT)
Dept: HEALTH INFORMATION MANAGEMENT | Facility: CLINIC | Age: 44
End: 2024-09-24
Payer: COMMERCIAL

## 2024-09-25 ENCOUNTER — ALLIED HEALTH/NURSE VISIT (OUTPATIENT)
Dept: FAMILY MEDICINE | Facility: CLINIC | Age: 44
End: 2024-09-25
Payer: COMMERCIAL

## 2024-09-25 ENCOUNTER — OFFICE VISIT (OUTPATIENT)
Dept: SLEEP MEDICINE | Facility: CLINIC | Age: 44
End: 2024-09-25
Payer: COMMERCIAL

## 2024-09-25 VITALS
SYSTOLIC BLOOD PRESSURE: 122 MMHG | WEIGHT: 315 LBS | OXYGEN SATURATION: 95 % | BODY MASS INDEX: 47.23 KG/M2 | DIASTOLIC BLOOD PRESSURE: 71 MMHG | HEART RATE: 83 BPM

## 2024-09-25 DIAGNOSIS — E66.01 MORBID OBESITY (H): ICD-10-CM

## 2024-09-25 DIAGNOSIS — Z23 ENCOUNTER FOR IMMUNIZATION: Primary | ICD-10-CM

## 2024-09-25 DIAGNOSIS — G47.33 OSA (OBSTRUCTIVE SLEEP APNEA): Primary | ICD-10-CM

## 2024-09-25 PROCEDURE — 99207 PR NO CHARGE NURSE ONLY: CPT | Performed by: PEDIATRICS

## 2024-09-25 PROCEDURE — 90656 IIV3 VACC NO PRSV 0.5 ML IM: CPT | Performed by: PEDIATRICS

## 2024-09-25 PROCEDURE — 96372 THER/PROPH/DIAG INJ SC/IM: CPT | Performed by: FAMILY MEDICINE

## 2024-09-25 PROCEDURE — 90471 IMMUNIZATION ADMIN: CPT | Performed by: PEDIATRICS

## 2024-09-25 PROCEDURE — 99213 OFFICE O/P EST LOW 20 MIN: CPT | Performed by: PHYSICIAN ASSISTANT

## 2024-09-25 RX ADMIN — CYANOCOBALAMIN 1000 MCG: 1000 INJECTION, SOLUTION INTRAMUSCULAR; SUBCUTANEOUS at 15:11

## 2024-09-25 NOTE — PROGRESS NOTES
[facility]   Outpatient Sleep Medicine  Sep 25, 2024       Name: Kimo Greenwood MRN# [id number]   Age: 43 year old YOB: 1980            Assessment and Plan:   1. LOREN (obstructive sleep apnea)  2. Morbid obesity (H)    Unfortunately, Care  down today so unable to obtain full download from his machine but I was able to pull some numbers from the unit itself showing 30-day average usage 9 hours per night with mask fit 99% and residual AHI 5.0 at current settings 9-15 cm H2O.  Patient is doing very well with CPAP therapy and has no concerns.  We agree not to make any adjustments today since doing so well and I encouraged him to continue his excellent compliance and keeping regular sleep schedule. Prescription was renewed for new mask and supplies.  Plan on a follow-up visit in about 1 year for annual CPAP visit or sooner as needed.  Of note, patient has been working on weight loss and is down about 50 pounds.  Congratulated him on this and we discussed the role that weight loss plays with overall LOREN severity and at next visit if weight continues to come off over the next year and over 10% of body weight is lost from his diagnostic PSG may want to pursue updated testing at that time.  - Comprehensive DME       Chief Complaint      Chief Complaint   Patient presents with    Sleep Problem     CPAP Follow up. Pt states no concerns.          History of Present Illness:     Kimo Greenwood is a 43 year old male with morbid obesity, chronic low back pain, asthma, Patel's esophagus, hypertension, depression, RBBB, intellectual delay, history of moderate to severe LOREN treated with CPAP therapy who presents to clinic today with his PCA for CPAP follow-up visit.      Patient was initially diagnosed with LOREN via split night PSG completed 4/15/2013 at [facility] in [place] (344#, BMI 49.2) showing AHI 23.2, RDI 41.1, oxygen gillian 85%.  PLM index 1.5 with PLM arousal  "0.4.  CPAP was titrated from 5 to 9 cm H2O with only lateral sleep observed. Patient was started on autoCPAP [id number] and appears he has been on the setting ever since. Uses [facility] for DME.     Presented 6/2023 to establish care. At that time patient was compliant on CPAP therapy but on download large leak was problematic as well as elevated AHI of 15.4 (obstructive apnea index 5.0, central apnea index 1.5, hypopnea index 8.9).  Given some air hunger noted by patient increase settings to 9-15 cm H2O and he was to follow-up with DME about mask fit.     Last seen 4/10/2024.  Getting a new mask from DME resolved leak concerns but was taking off unknowingly in sleep pressures were never adjusted so were still at 7-15 cm H2O, with residual AHI of 9 so some improvement but we agreed to increase settings as previously ordered to 9-15 cm H2O and discussed addition of chinstrap to his current mask to make it harder for him to take off unknowingly in sleep.  Discussed importance of keeping a consistent sleep schedule all days of the week and avoiding naps during the day to consolidate sleep better at night.  Planned on a follow-up in 3-4 months to recheck progress after pressure adjustment.    Returns today stating \"things are better\".  No concerns today.  Happy with his fullface mask, no leak concerns.  Pressure settings are now comfortable.  Has stabilized his sleep-wake schedule with average bedtime between 1030-11:00 PM and wake time between 7-8:00 AM.  Always wakes with mask still on his face, no longer taking off unknowingly in sleep.    SCALES:   INSOMNIA: Insomnia Severity Score: 2   SLEEPINESS: [place] Sleepiness Score: 0    Past medical/surgical history, family history, social history, medications and allergies were reviewed.           Physical Examination:   /71   Pulse 83   Wt (!) 162.4 kg (358 lb)   SpO2 95%   BMI 47.23 kg/m    General appearance: Awake, alert, cooperative. Well groomed. " Sitting comfortably in wheelchair. In no apparent distress.  HEENT: Head: Normocephalic, atraumatic. Eyes:Conjunctiva clear. Sclera normal. Nose: External appearance without deformity.   Pulmonary:  Able to speak easily in full sentences. No cough or wheeze.   Skin:  No rashes or significant lesions on visible skin.   Neurologic: Alert, oriented x3.   Psychiatric: Mood euthymic. Affect congruent with full range and intensity.      CC:  Ciro Love PA-C  Sep 25, 2024     [facility]  [address] [place] [address]     [facility]  [address] [address]    Chart documentation was completed, in part, with [employer] voice-recognition software. Even though reviewed, some grammatical, spelling, and word errors may remain.

## 2024-09-25 NOTE — PROGRESS NOTES
Prior to immunization administration, verified patients identity using patient s name and date of birth. Please see Immunization Activity for additional information.     Is the patient's temperature normal (100.5 or less)? Yes     Patient MEETS CRITERIA. PROCEED with vaccine administration.      Patient instructed to remain in clinic for 15 minutes afterwards, and to report any adverse reactions.      Link to Ancillary Visit Immunization Standing Orders SmartSet     Screening performed by Davon Nichols CMA on 9/25/2024 at 3:10 PM.

## 2024-09-25 NOTE — NURSING NOTE
"Chief Complaint   Patient presents with    Sleep Problem     CPAP Follow up. Pt states no concerns.       Initial /71   Pulse 83   Wt (!) 162.4 kg (358 lb)   SpO2 95%   BMI 47.23 kg/m   Estimated body mass index is 47.23 kg/m  as calculated from the following:    Height as of 7/22/24: 1.854 m (6' 1\").    Weight as of this encounter: 162.4 kg (358 lb).    Medication Reconciliation: complete    DME: [facility]    ESS     LORENZA Santamaria MA   "

## 2024-09-30 NOTE — TELEPHONE ENCOUNTER
Medication Requested:  tirzepatide-Weight Management (ZEPBOUND) 12.5 MG/0.5ML prefilled pen 2 mL 0 9/4/2024 -- No   Sig - Route: Inject 0.5 mLs (12.5 mg) subcutaneously every 7 days. - Subcutaneous     ----------------------  Last Office Visit : 6/27/2024  [facility] [place]      Future Office visit:    10/10/2024 2:00 PM (30 min)  [place]    Arrive by:  1:45 PM   RETURN WEIGHT MANAGEMENT   [facility] ([facility])   Nya Lara PA-C     ----------------------    Refill decision: Refill pended and routed to the provider for review/determination due to the following criteria not met:     Medication not on [facility], [facility], [facility] refill protocol

## 2024-10-02 ENCOUNTER — NURSE TRIAGE (OUTPATIENT)
Dept: FAMILY MEDICINE | Facility: CLINIC | Age: 44
End: 2024-10-02
Payer: COMMERCIAL

## 2024-10-02 NOTE — TELEPHONE ENCOUNTER
"Reason for Disposition   MODERATE pain (e.g., symptoms interfere with work or school, limping) and present > 3 days    Additional Information   Negative: Sounds like a life-threatening emergency to the triager   Negative: Followed a knee injury   Negative: Swollen knee joint and fever   Negative: Patient sounds very sick or weak to the triager   Negative: Can't move swollen joint at all   Negative: Thigh or calf pain and only 1 side and present > 1 hour   Negative: Thigh or calf swelling and only 1 side   Negative: SEVERE pain (e.g., excruciating, unable to walk)   Negative: Very swollen knee joint   Negative: Painful rash with multiple small blisters grouped together (i.e., dermatomal distribution or 'band' or 'stripe')   Negative: Looks like a boil, infected sore, deep ulcer, or other infected rash (spreading redness, pus)    Answer Assessment - Initial Assessment Questions  1. LOCATION and RADIATION: \"Where is the pain located?\"       Outer aspect of left knee  2. QUALITY: \"What does the pain feel like?\"  (e.g., sharp, dull, aching, burning)      Sharp pain  3. SEVERITY: \"How bad is the pain?\" \"What does it keep you from doing?\"   (Scale 1-10; or mild, moderate, severe)    -  MILD (1-3): doesn't interfere with normal activities     -  MODERATE (4-7): interferes with normal activities (e.g., work or school) or awakens from sleep, limping     -  SEVERE (8-10): excruciating pain, unable to do any normal activities, unable to walk      8/10  4. ONSET: \"When did the pain start?\" \"Does it come and go, or is it there all the time?\"      Back of knee several weeks ago-only mild pain.  Pain worsening and is not wanting to bear weight and has canceled appointments due to pain  5. RECURRENT: \"Have you had this pain before?\" If Yes, ask: \"When, and what happened then?\"      Yes, possibly gout  6. SETTING: \"Has there been any recent work, exercise or other activity that involved that part of the body?\"       no  7. " "AGGRAVATING FACTORS: \"What makes the knee pain worse?\" (e.g., walking, climbing stairs, running)      Trying to walk and bear weight  8. ASSOCIATED SYMPTOMS: \"Is there any swelling or redness of the knee?\"      Possible mild swelling  9. OTHER SYMPTOMS: \"Do you have any other symptoms?\" (e.g., chest pain, difficulty breathing, fever, calf pain)      Calf pain on side of left leg.    10. PREGNANCY: \"Is there any chance you are pregnant?\" \"When was your last menstrual period?\"        N/a    Protocols used: Knee Pain-A-OH    "

## 2024-10-03 ENCOUNTER — OFFICE VISIT (OUTPATIENT)
Dept: FAMILY MEDICINE | Facility: CLINIC | Age: 44
End: 2024-10-03
Payer: COMMERCIAL

## 2024-10-03 VITALS
SYSTOLIC BLOOD PRESSURE: 115 MMHG | WEIGHT: 315 LBS | OXYGEN SATURATION: 97 % | HEIGHT: 73 IN | TEMPERATURE: 98.6 F | RESPIRATION RATE: 20 BRPM | HEART RATE: 82 BPM | DIASTOLIC BLOOD PRESSURE: 73 MMHG | BODY MASS INDEX: 41.75 KG/M2

## 2024-10-03 DIAGNOSIS — M25.561 CHRONIC PAIN OF BOTH KNEES: Primary | ICD-10-CM

## 2024-10-03 DIAGNOSIS — M25.562 CHRONIC PAIN OF BOTH KNEES: Primary | ICD-10-CM

## 2024-10-03 DIAGNOSIS — G89.29 CHRONIC PAIN OF BOTH KNEES: Primary | ICD-10-CM

## 2024-10-03 DIAGNOSIS — R63.1 POLYDIPSIA: ICD-10-CM

## 2024-10-03 PROCEDURE — 99213 OFFICE O/P EST LOW 20 MIN: CPT

## 2024-10-03 RX ORDER — ACETAMINOPHEN 500 MG
500-1000 TABLET ORAL EVERY 8 HOURS PRN
COMMUNITY
Start: 2024-10-03

## 2024-10-03 RX ORDER — KETOCONAZOLE 20 MG/ML
SHAMPOO, SUSPENSION TOPICAL
COMMUNITY
Start: 2024-09-04

## 2024-10-03 RX ORDER — HYDROCORTISONE 25 MG/G
CREAM TOPICAL
COMMUNITY

## 2024-10-03 RX ORDER — KETOCONAZOLE 20 MG/G
CREAM TOPICAL
COMMUNITY

## 2024-10-03 RX ORDER — ECONAZOLE NITRATE 10 MG/G
CREAM TOPICAL
COMMUNITY

## 2024-10-03 ASSESSMENT — ASTHMA QUESTIONNAIRES
QUESTION_4 LAST FOUR WEEKS HOW OFTEN HAVE YOU USED YOUR RESCUE INHALER OR NEBULIZER MEDICATION (SUCH AS ALBUTEROL): NOT AT ALL
QUESTION_1 LAST FOUR WEEKS HOW MUCH OF THE TIME DID YOUR ASTHMA KEEP YOU FROM GETTING AS MUCH DONE AT WORK, SCHOOL OR AT HOME: NONE OF THE TIME
QUESTION_5 LAST FOUR WEEKS HOW WOULD YOU RATE YOUR ASTHMA CONTROL: COMPLETELY CONTROLLED
QUESTION_3 LAST FOUR WEEKS HOW OFTEN DID YOUR ASTHMA SYMPTOMS (WHEEZING, COUGHING, SHORTNESS OF BREATH, CHEST TIGHTNESS OR PAIN) WAKE YOU UP AT NIGHT OR EARLIER THAN USUAL IN THE MORNING: NOT AT ALL
ACT_TOTALSCORE: 25
ACT_TOTALSCORE: 25
QUESTION_2 LAST FOUR WEEKS HOW OFTEN HAVE YOU HAD SHORTNESS OF BREATH: NOT AT ALL

## 2024-10-03 ASSESSMENT — PATIENT HEALTH QUESTIONNAIRE - PHQ9: SUM OF ALL RESPONSES TO PHQ QUESTIONS 1-9: 3

## 2024-10-03 NOTE — PROGRESS NOTES
"  Assessment & Plan     Chronic pain of both knees  Recommend further evaluation by orthopedist. No acute injury, no new findings on exam. Refer for PT. Tylenol as needed for pain.       Polydipsia  Fasting glucose ordered. Follow up for further evaluation.       BMI  Estimated body mass index is 46.84 kg/m  as calculated from the following:    Height as of this encounter: 1.854 m (6' 1\").    Weight as of this encounter: 161 kg (355 lb).             Carline Lamar is a 43 year old, presenting for the following health issues:  Knee Pain (Left knee pain/swelling x3 weeks)        10/3/2024     2:28 PM   Additional Questions   Roomed by Kaitlin Berman   Accompanied by [facility] Coordinator     Knee Pain       Worsening knee pain left knee, doing pt/pool therapy, no known injury  Worsening x 3 weeks; no pain with rest  Using walker x 1 week  Stops him from usual activity  Known bone on bone with planned bilateral knee replacement, status post left hip replacement, planned right hip replacement.    Caretaker also notes he has had increased thirst x 1 week. Would like glucose screening. No other symptoms, no new mental status changes, tremors, diaphoresis.     Pain History:  When did you first notice your pain? X3 weeks   Have you seen anyone else for your pain? Yes -   How has your pain affected your ability to work? 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 Musculoskeletal problem/pain  Onset/Duration: x3 weeks  Description  Location: knee - left  Joint Swelling: YES  Redness: YES  Pain: YES  Warmth: No  Intensity:  moderate  Progression of Symptoms:  same  Accompanying signs and symptoms:   Fevers: No  Numbness/tingling/weakness: No  History  Trauma to the area: No  Recent illness:  No  Previous similar problem: YES  Previous evaluation:  YES  Precipitating or alleviating factors:  Aggravating factors include: standing, walking, and " "climbing stairs  Therapies tried and outcome: rest/inactivity, heat, support wrap, and physical therapy              Objective    /73 (BP Location: Right arm, Patient Position: Chair, Cuff Size: Adult Large)   Pulse 82   Temp 98.6  F (37  C) (Oral)   Resp 20   Ht 1.854 m (6' 1\")   Wt (!) 161 kg (355 lb)   SpO2 97%   BMI 46.84 kg/m    Body mass index is 46.84 kg/m .  Physical Exam   GENERAL: alert and no distress  RESP: lungs clear to auscultation - no rales, rhonchi or wheezes  CV: regular rate and rhythm, normal S1 S2, no S3 or S4, no murmur, click or rub, no peripheral edema  MS: normal muscle tone, decreased range of motion bilateral knees, mild edema to left knee, peripheral pulses normal, tenderness to palpation left knee over lateral meniscus, RLE exam shows no erythema, induration, or nodules and no evidence of joint instability, and LLE exam shows no erythema, induration, or nodules and no evidence of joint instability  SKIN: no suspicious lesions or rashes  NEURO: Normal strength and tone, mentation intact and speech normal  PSYCH: mentation appears normal, affect normal/bright            Signed Electronically by: LAMBERT Cabral CNP    "

## 2024-10-04 ENCOUNTER — LAB (OUTPATIENT)
Dept: LAB | Facility: CLINIC | Age: 44
End: 2024-10-04
Payer: COMMERCIAL

## 2024-10-04 DIAGNOSIS — R63.1 POLYDIPSIA: ICD-10-CM

## 2024-10-04 PROCEDURE — 36415 COLL VENOUS BLD VENIPUNCTURE: CPT

## 2024-10-04 PROCEDURE — 82947 ASSAY GLUCOSE BLOOD QUANT: CPT

## 2024-10-05 LAB
FASTING STATUS PATIENT QL REPORTED: NORMAL
GLUCOSE SERPL-MCNC: 92 MG/DL (ref 70–99)

## 2024-10-08 ENCOUNTER — TELEPHONE (OUTPATIENT)
Dept: FAMILY MEDICINE | Facility: CLINIC | Age: 44
End: 2024-10-08
Payer: COMMERCIAL

## 2024-10-08 DIAGNOSIS — M25.569 ACUTE KNEE PAIN, UNSPECIFIED LATERALITY: Primary | ICD-10-CM

## 2024-10-08 NOTE — TELEPHONE ENCOUNTER
Kathryn--you saw patient on 10/3/24--see below message request for [facility] referral. Are you able to place referral or other recommendation? Referral pended.     Thank you,  Traci Acosta R.N.

## 2024-10-08 NOTE — TELEPHONE ENCOUNTER
Order/Referral Request    Who is requesting: [facility] PT Naty calling    Orders being requested: PT to [facility] not received    Reason service is needed/diagnosis: knee pain    When are orders needed by: as soon as possible     Has this been discussed with Provider: Yes    Does patient have a preference on a Group/Provider/Facility? [facility]    Does patient have an appointment scheduled?: No    Where to send orders: Fax [phone]    Could we send this information to you in [employer] or would you prefer to receive a phone call?:   Patient would prefer a phone call   Okay to leave a detailed message?: Yes at Other phone number:  [phone] Naty

## 2024-10-09 NOTE — TELEPHONE ENCOUNTER
Taylor Young, LAMBERT [facility] Nurse Pool - Primary Care9 hours ago (10:03 PM)     JG  Referral signed and sent to [facility]      Tranexamic Acid Pregnancy And Lactation Text: It is unknown if this medication is safe during pregnancy or breast feeding.

## 2024-10-10 ENCOUNTER — OFFICE VISIT (OUTPATIENT)
Dept: ENDOCRINOLOGY | Facility: CLINIC | Age: 44
End: 2024-10-10
Payer: COMMERCIAL

## 2024-10-10 VITALS
BODY MASS INDEX: 42.66 KG/M2 | OXYGEN SATURATION: 98 % | HEIGHT: 72 IN | WEIGHT: 315 LBS | DIASTOLIC BLOOD PRESSURE: 71 MMHG | SYSTOLIC BLOOD PRESSURE: 102 MMHG | HEART RATE: 82 BPM

## 2024-10-10 DIAGNOSIS — E66.01 CLASS 3 SEVERE OBESITY DUE TO EXCESS CALORIES WITH SERIOUS COMORBIDITY AND BODY MASS INDEX (BMI) OF 50.0 TO 59.9 IN ADULT (H): ICD-10-CM

## 2024-10-10 DIAGNOSIS — E66.813 CLASS 3 SEVERE OBESITY DUE TO EXCESS CALORIES WITH SERIOUS COMORBIDITY AND BODY MASS INDEX (BMI) OF 50.0 TO 59.9 IN ADULT (H): ICD-10-CM

## 2024-10-10 PROCEDURE — G2211 COMPLEX E/M VISIT ADD ON: HCPCS

## 2024-10-10 PROCEDURE — 99215 OFFICE O/P EST HI 40 MIN: CPT

## 2024-10-10 ASSESSMENT — PAIN SCALES - GENERAL: PAINLEVEL: EXTREME PAIN (8)

## 2024-10-10 NOTE — LETTER
10/10/2024       RE: Kimo Greenwood   [address]  [facility] [facility] [address]     Dear Colleague,    Thank you for referring your patient, Kimo Greenwood, to the [facility]NAG[facility] [place] at [facility]. Please see a copy of my visit note below.      Return Medical Weight Management Note     Kimo Greenwood  MRN:  [id number]  :  1980  SALEEM:  10/10/2024    Dear Ciro Love MD,    I had the pleasure of seeing your patient Kimo Greenwood. He is a 43 year old male who I am continuing to see for treatment of obesity related to:        2023     1:43 PM   --   I have the following health issues associated with obesity None of the above       Assessment & Plan  Problem List Items Addressed This Visit       Class 3 severe obesity with serious comorbidity and body mass index (BMI) of 50.0 to 59.9 in adult (H)    Relevant Medications    tirzepatide-Weight Management (ZEPBOUND) 12.5 MG/0.5ML prefilled pen    Other Relevant Orders    Adult Bariatrics and Weight Management Clinic Follow-Up Order      Continue Zepbound 12.5mg once weekly.   Activity goal - pool therapy 2xweek  Food goal   Decrease mountain dew - 6 pack weekly and decrease big gulp size  Eat 3 protein focused meals a day  Continue working on smoking cessation   Nya Camarena in 3 months     INTERVAL HISTORY:  First seen for NBS - 2023  Continued with MWM, concerned for post op diet.   Weight loss required for hip replacement <330lbs  Transitioned to Wegovy 1.0mg   3/13/23 - continued Wegovy 1.0mg, goal of decreasing pop to 1-2 bottles daily   Reached out via [employer] 23 - increased Wegovy 1.7mg   23 - continued Wegovy 1.7mg   2023- continued wegovy 1.7, was finding it helpful with hunger and weight loss    Reached out via [employer] and increased Wegovy 2.4mg    Tried to transition to zepbound, but was not on formulary  Transitioned to  Zepbound      Believes he is 10lbs away from goal for hip surgery. Is still working on smoking cessation     Anti-obesity medication history    Current:   Zepbound 15mg - was on it for around 6 weeks. Had side effects of nausea with continued use and did not get better. He thinks that happened for around 2 weeks.     Dose decreased back to Zepbound 12.5mg - has been on this the past 2 months. Feels like this is a better fit. Would like to continue at this dose.     Recent diet changes: Better with mountain dew over the summer, but has since seen an increase. Will do a 6 pack weekly, an additional 12oz and big gulp (48oz) 2-3xweek. Eating 1 meal a day with 1-2 snacks. Hunger is well controlled.     Recent exercise/activity changes: Goes to pool therapy 2xweek. However, has been canceled a lot with pool cleanings and reschedules     CURRENT WEIGHT:   350 lbs 9.6 oz    Initial Weight (lbs): 407 lbs  Last Visits Weight: 163.3 kg (360 lb)  Cumulative weight loss (lbs): 56.4  Weight Loss Percentage: 13.86%    Wt Readings from Last 5 Encounters:   10/10/24 (!) 159 kg (350 lb 9.6 oz)   10/03/24 (!) 161 kg (355 lb)   09/25/24 (!) 162.4 kg (358 lb)   07/22/24 (!) 166.5 kg (367 lb)   07/10/24 (!) 163.3 kg (360 lb)             10/10/2024     2:06 PM   Changes and Difficulties   I have made the following changes to my diet since my last visit: eating smaller portions   With regards to my diet, I am still struggling with: skipping meals   With regards to my activity/exercise, I am still struggling with: pain         MEDICATIONS:   Current Outpatient Medications   Medication Sig Dispense Refill     acetaminophen (TYLENOL) 500 MG tablet Take 1-2 tablets (500-1,000 mg) by mouth every 8 hours as needed for mild pain.       albuterol (VENTOLIN HFA) 108 (90 Base) MCG/ACT inhaler INHALE 2 PUFFS INTO LUNGS EVERY SIX HOURS AS NEEDED FOR SHORTNESS OF BREATH / DYSPNEA OR WHEEZING 18 g 1     calcium polycarbophil (FIBER-LAX) 625 MG tablet  TAKE 2 TABLETS (1250MG) BY MOUTH TWICE A  tablet 3     celecoxib (CELEBREX) 100 MG capsule Take 1 capsule (100 mg) by mouth 2 times daily 60 capsule 1     DULoxetine (CYMBALTA) 60 MG capsule Take 120 mg by mouth daily        econazole nitrate 1 % external cream        ferrous sulfate (FEROSUL) 325 (65 Fe) MG tablet Take 1 tablet (325 mg) by mouth daily (with breakfast) 30 tablet 2     hydrochlorothiazide (HYDRODIURIL) 25 MG tablet TAKE 1 TABLET BY MOUTH DAILY 28 tablet 2     hydrocortisone 2.5 % cream APPLY THIN LAYER TO AFFECTED AREAS ON FACE TWICE DAILY FOR UP TO 2 WEEKS. DECREASE USE AS RASH IMPROVING. REPEAT AS NEEDED FOR FLARES       ketoconazole (NIZORAL) 2 % external cream APPLY THIN LAYER TOPICALLY TO FACE RASH TWICE DAILY UNTIL IMPROVED FOR SEBORRHEIC DERMATITIS       ketoconazole (NIZORAL) 2 % external shampoo        lisinopril (ZESTRIL) 10 MG tablet TAKE 1 TABLET BY MOUTH EVERY MORNING 90 tablet 3     mirabegron (MYRBETRIQ) 50 MG 24 hr tablet Take 1 tablet by mouth daily       nicotine (NICORETTE) 2 MG gum Place 1 each (2 mg) inside cheek every hour as needed for nicotine withdrawal symptoms 50 each 1     nystatin-triamcinolone (MYCOLOG II) 929665-9.1 UNIT/GM-% external cream Apply topically 2 times daily 60 g 0     OLANZapine-samidorphan (LYBALVI) 10-10 MG TABS tablet Take 1 tablet by mouth At Bedtime       order for DME Equipment being ordered: CPAP supplies 1 each 0     oxyBUTYnin ER (DITROPAN XL) 15 MG 24 hr tablet Take 2 tablets (30 mg) by mouth daily 60 tablet 1     pantoprazole (PROTONIX) 40 MG EC tablet TAKE 1 TABLET BY MOUTH ONCE DAILY 30-60 MINUTE(S) BEFORE FIRST MEAL OF THE DAY 28 tablet 2     prazosin (MINIPRESS) 1 MG capsule Take 3 mg by mouth At Bedtime       tirzepatide-Weight Management (ZEPBOUND) 12.5 MG/0.5ML prefilled pen Inject 0.5 mLs (12.5 mg) subcutaneously every 7 days. 2 mL 4           10/10/2024     2:06 PM   Weight Loss Medication History Reviewed With Patient   Are you  having any side effects from the weight loss medication that we have prescribed you? No           Objective   /71 (BP Location: Right arm, Patient Position: Chair, Cuff Size: Adult Large)   Pulse 82   Ht 1.829 m (6')   Wt (!) 159 kg (350 lb 9.6 oz)   SpO2 98%   BMI 47.55 kg/m      PHYSICAL EXAM:  GENERAL: alert and no distress  EYES: Eyes grossly normal to inspection.  No discharge or erythema, or obvious scleral/conjunctival abnormalities.  RESP: No audible wheeze, cough, or visible cyanosis.    SKIN: Visible skin clear. No significant rash, abnormal pigmentation or lesions.  NEURO: Cranial nerves grossly intact.  Mentation and speech appropriate for age.  PSYCH: Appropriate affect, tone, and pace of words        Sincerely,    Nya Lara PA-C      45 minutes spent by me on the date of the encounter doing chart review, history and exam, documentation and further activities per the note    The longitudinal plan of care for the diagnosis(es)/condition(s) as documented were addressed during this visit. Due to the added complexity in care, I will continue to support Willard in the subsequent management and with ongoing continuity of care.      Again, thank you for allowing me to participate in the care of your patient.      Sincerely,    Nya Lara PA-C

## 2024-10-10 NOTE — PROGRESS NOTES
Return Medical Weight Management Note     Kimo Greenwood  MRN:  [id number]  :  1980  SALEEM:  10/10/2024    Dear Ciro Love MD,    I had the pleasure of seeing your patient Kimo Greenwood. He is a 43 year old male who I am continuing to see for treatment of obesity related to:        2023     1:43 PM   --   I have the following health issues associated with obesity None of the above       Assessment & Plan   Problem List Items Addressed This Visit       Class 3 severe obesity with serious comorbidity and body mass index (BMI) of 50.0 to 59.9 in adult (H)    Relevant Medications    tirzepatide-Weight Management (ZEPBOUND) 12.5 MG/0.5ML prefilled pen    Other Relevant Orders    [facility] Follow-Up Order      Continue Zepbound 12.5mg once weekly.   Activity goal - pool therapy 2xweek  Food goal   Decrease mountain dew - 6 pack weekly and decrease big gulp size  Eat 3 protein focused meals a day  Continue working on smoking cessation   Nya Camarena in 3 months     INTERVAL HISTORY:  First seen for NBS - 2023  Continued with MWM, concerned for post op diet.   Weight loss required for hip replacement <330lbs  Transitioned to Wegovy 1.0mg   3/13/23 - continued Wegovy 1.0mg, goal of decreasing pop to 1-2 bottles daily   Reached out via [employer] 23 - increased Wegovy 1.7mg   23 - continued Wegovy 1.7mg   2023- continued wegovy 1.7, was finding it helpful with hunger and weight loss    Reached out via [employer] and increased Wegovy 2.4mg    Tried to transition to zepbound, but was not on formulary  Transitioned to Zepbound      Believes he is 10lbs away from goal for hip surgery. Is still working on smoking cessation     Anti-obesity medication history    Current:   Zepbound 15mg - was on it for around 6 weeks. Had side effects of nausea with continued use and did not get better. He thinks that happened for around 2 weeks.     Dose decreased back to Zepbound  12.5mg - has been on this the past 2 months. Feels like this is a better fit. Would like to continue at this dose.     Recent diet changes: Better with mountain dew over the summer, but has since seen an increase. Will do a 6 pack weekly, an additional 12oz and big gulp (48oz) 2-3xweek. Eating 1 meal a day with 1-2 snacks. Hunger is well controlled.     Recent exercise/activity changes: Goes to pool therapy 2xweek. However, has been canceled a lot with pool cleanings and reschedules     CURRENT WEIGHT:   350 lbs 9.6 oz    Initial Weight (lbs): 407 lbs  Last Visits Weight: 163.3 kg (360 lb)  Cumulative weight loss (lbs): 56.4  Weight Loss Percentage: 13.86%    Wt Readings from Last 5 Encounters:   10/10/24 (!) 159 kg (350 lb 9.6 oz)   10/03/24 (!) 161 kg (355 lb)   09/25/24 (!) 162.4 kg (358 lb)   07/22/24 (!) 166.5 kg (367 lb)   07/10/24 (!) 163.3 kg (360 lb)             10/10/2024     2:06 PM   Changes and Difficulties   I have made the following changes to my diet since my last visit: eating smaller portions   With regards to my diet, I am still struggling with: skipping meals   With regards to my activity/exercise, I am still struggling with: pain         MEDICATIONS:   Current Outpatient Medications   Medication Sig Dispense Refill    acetaminophen (TYLENOL) 500 MG tablet Take 1-2 tablets (500-1,000 mg) by mouth every 8 hours as needed for mild pain.      albuterol (VENTOLIN HFA) 108 (90 Base) MCG/ACT inhaler INHALE 2 PUFFS INTO LUNGS EVERY SIX HOURS AS NEEDED FOR SHORTNESS OF BREATH / DYSPNEA OR WHEEZING 18 g 1    calcium polycarbophil (FIBER-LAX) 625 MG tablet TAKE 2 TABLETS (1250MG) BY MOUTH TWICE A  tablet 3    celecoxib (CELEBREX) 100 MG capsule Take 1 capsule (100 mg) by mouth 2 times daily 60 capsule 1    DULoxetine (CYMBALTA) 60 MG capsule Take 120 mg by mouth daily       econazole nitrate 1 % external cream       ferrous sulfate (FEROSUL) 325 (65 Fe) MG tablet Take 1 tablet (325 mg) by mouth  daily (with breakfast) 30 tablet 2    hydrochlorothiazide (HYDRODIURIL) 25 MG tablet TAKE 1 TABLET BY MOUTH DAILY 28 tablet 2    hydrocortisone 2.5 % cream APPLY THIN LAYER TO AFFECTED AREAS ON FACE TWICE DAILY FOR UP TO 2 WEEKS. DECREASE USE AS RASH IMPROVING. REPEAT AS NEEDED FOR FLARES      ketoconazole (NIZORAL) 2 % external cream APPLY THIN LAYER TOPICALLY TO FACE RASH TWICE DAILY UNTIL IMPROVED FOR SEBORRHEIC DERMATITIS      ketoconazole (NIZORAL) 2 % external shampoo       lisinopril (ZESTRIL) 10 MG tablet TAKE 1 TABLET BY MOUTH EVERY MORNING 90 tablet 3    mirabegron (MYRBETRIQ) 50 MG 24 hr tablet Take 1 tablet by mouth daily      nicotine (NICORETTE) 2 MG gum Place 1 each (2 mg) inside cheek every hour as needed for nicotine withdrawal symptoms 50 each 1    nystatin-triamcinolone (MYCOLOG II) 868123-9.1 UNIT/GM-% external cream Apply topically 2 times daily 60 g 0    OLANZapine-samidorphan (LYBALVI) 10-10 MG TABS tablet Take 1 tablet by mouth At Bedtime      order for DME Equipment being ordered: CPAP supplies 1 each 0    oxyBUTYnin ER (DITROPAN XL) 15 MG 24 hr tablet Take 2 tablets (30 mg) by mouth daily 60 tablet 1    pantoprazole (PROTONIX) 40 MG EC tablet TAKE 1 TABLET BY MOUTH ONCE DAILY 30-60 MINUTE(S) BEFORE FIRST MEAL OF THE DAY 28 tablet 2    prazosin (MINIPRESS) 1 MG capsule Take 3 mg by mouth At Bedtime      tirzepatide-Weight Management (ZEPBOUND) 12.5 MG/0.5ML prefilled pen Inject 0.5 mLs (12.5 mg) subcutaneously every 7 days. 2 mL 4           10/10/2024     2:06 PM   Weight Loss Medication History Reviewed With Patient   Are you having any side effects from the weight loss medication that we have prescribed you? No           Objective    /71 (BP Location: Right arm, Patient Position: Chair, Cuff Size: Adult Large)   Pulse 82   Ht 1.829 m (6')   Wt (!) 159 kg (350 lb 9.6 oz)   SpO2 98%   BMI 47.55 kg/m      PHYSICAL EXAM:  GENERAL: alert and no distress  EYES: Eyes grossly normal to  inspection.  No discharge or erythema, or obvious scleral/conjunctival abnormalities.  RESP: No audible wheeze, cough, or visible cyanosis.    SKIN: Visible skin clear. No significant rash, abnormal pigmentation or lesions.  NEURO: Cranial nerves grossly intact.  Mentation and speech appropriate for age.  PSYCH: Appropriate affect, tone, and pace of words        Sincerely,    Nya Lara PA-C      45 minutes spent by me on the date of the encounter doing chart review, history and exam, documentation and further activities per the note    The longitudinal plan of care for the diagnosis(es)/condition(s) as documented were addressed during this visit. Due to the added complexity in care, I will continue to support Willard in the subsequent management and with ongoing continuity of care.

## 2024-10-10 NOTE — NURSING NOTE
Chief Complaint   Patient presents with    Follow Up     MWM follow up       Vitals:    10/10/24 1359   BP: 102/71   BP Location: Right arm   Patient Position: Chair   Cuff Size: Adult Large   Pulse: 82   SpO2: 98%   Weight: (!) 159 kg (350 lb 9.6 oz)   Height: 1.829 m (6')       Body mass index is 47.55 kg/m .

## 2024-10-10 NOTE — PATIENT INSTRUCTIONS
"Robert Andersond, it was nice to meet you today!  Thank you for allowing us the privilege of caring for you. We hope we provided you with the excellent service you deserve.   Please let us know if there is anything else we can do for you so that we can be sure you are completely satisfied with your care experience.    To ensure the quality of our services you may be receiving a patient satisfaction survey from an independent patient satisfaction monitoring company.    The greatest compliment you can give is a \"Likely to Recommend\"    Your visit was with ShashaNciol Lara PA-C today.    Instructions per today's visit:     Continue Zepbound 12.5mg once weekly.   Activity goal - pool therapy 2xweek  Food goal   Decrease mountain dew - 6 pack weekly and decrease big gulp size  Eat 3 protein focused meals a day  Continue working on smoking cessation   Nya Camarena in 3 months     ___________________________________________________________________________  Important contact and scheduling information:  Please call our contact center at [phone] to schedule your next appointments.  For any nursing questions or concerns call Niurka Haque LPN at [phone] or Denise Hubbard RN at [phone]  Please call during clinic hours Monday through Friday 8:00a - 4:00p if you have questions or you can contact us via [employer] at anytime and we will reply during clinic hours.    Lab results will be communicated through My Chart or letter (if My Chart not used). Please call the clinic if you have not received communication after 1 week or if you have any questions.?  Clinic Fax: [phone]  __________________________________________________________________________    If labs were ordered today:    Please make an appointment to have them drawn at your convenience.     To schedule the Lab Appointment using [employer]:  Select \"Schedule an Appointment\"  Select \"Lab Only\"  For \"A couple of questions\", select \"Other\"  For \"Which locations work for " you?, select the location and set up the appointment    To schedule by phone call [phone] to schedule a lab only appointment at any [facility] lab.  ___________________________________________________________________________  Work with A [facility] !  Virtual Sessions are Available through [facility] Weight Management Clinics    To learn more, call to schedule a free, Health  Q&A appointment: [phone]     What is Health Coaching?  Do you know what you are supposed to do, but you just aren't doing it?  Then, HEALTH COACHING may help you!   Get unstuck and move forward with the support of a professionally trained [employer]-HWC (National Board-Certified Health and ) who uses evidence-based approaches to help you move forward with healthy lifestyle changes in the areas of weight loss, stress management and overall well-being.    Health Coaches help you identify goals that will work best for you. Health Coaches provide support and encouragement with overcoming barriers and help you to find inspiration and motivation to lead a healthy lifestyle.    Option one:  Health Coaching 3-Pack; Three, 30-minute Health Coaching Visits, for $99  Visits are done virtually (phone or video)  This is a self pay service; we do not accept insurance for mansi coaching.    Option two:   The 24 week Plan; 11 Health Coaching Visits, and a 7 months subscription to [employer]-- on-demand fitness, nutrition and mindfulness classes, for $499 (employee discounts may be available). Participants will also meet regularly with a weight management Medical Provider and a Registered/Licensed Dietician.  This is a self-pay service; we do not accept insurance for health coaching.    To Schedule a free Health  Q&A appointment to learn more,  call [phone].  ____________________________________________________________________  [facility]  Healthy Lifestyle  "Group    Healthy Lifestyle Group  This is a 60 minute virtual coaching group for those who want to lead a healthier lifestyle. Come together to set goals and overcome barriers in a supportive group environment. We will address the four pillars of health--nutrition, exercise, sleep and emotional well-being.  This group is highly recommended for those who are participating in the 24 week Healthy Lifestyle Plan and our Health Coaching sessions.    WHEN: This group meets the first Friday of the month, 12:30 PM - 1:30 PM online, via a zoom meeting.      FACILITATOR: Led by National Board Certified Health and , Jacklyn Benedict, [facility].    TO REGISTER: Please call the Call Center at [phone] to register. You will get an appointment to attend in [facility]. Fifteen minutes prior to the meeting, complete the e-check in and you will get the link to join the meeting.  There is no charge to attend this group and space is limited.      2023 and 2024 Meeting Topics and Dates:    November 3: Introduction to Mindfulness (Learn simple and effective mindfulness practices and how it can benefit you)    December 8: Let's Talk (guided discussion on our wins and challenges)    January 5: New Years Vision: Manifest your Best 2024! (Guided imagery,  journaling and discussion)    February 2: Let's Talk    March 1: 10 Percent Happier by Laureano Avina (Book Bites; a guided discussion on the nuggets of wisdom from favorite wellness books; no need to read the book but highly encouraged)    April 5: Let's Talk    May 3: \"Essentialism; The Disciplined Pursuit of Less by Balta Cartwright (book bites discussion)    June 7: Let's Talk    July 5: NO MEETING, off for the 4th of July Holiday    August 2: The Blue Zones, Secrets for Living a Longer Life by Laureano Mike (book bites discussion)      If you would like bariatric surgery specific support group info please let your care team know.         Thank you,   RADHA Bragg" Weight Management Team

## 2024-10-15 ENCOUNTER — TELEPHONE (OUTPATIENT)
Dept: SLEEP MEDICINE | Facility: CLINIC | Age: 44
End: 2024-10-15
Payer: COMMERCIAL

## 2024-10-18 ENCOUNTER — TELEPHONE (OUTPATIENT)
Dept: FAMILY MEDICINE | Facility: CLINIC | Age: 44
End: 2024-10-18
Payer: COMMERCIAL

## 2024-10-18 DIAGNOSIS — M25.569 ACUTE KNEE PAIN, UNSPECIFIED LATERALITY: Primary | ICD-10-CM

## 2024-10-18 NOTE — TELEPHONE ENCOUNTER
Julia, physical therapist from [facility] calls to say patient had physical therapy evaluation today and physical therapy would like patient to start pool therapy, but they need updated physical therapy order specifically for pool therapy to be faxed to them. Pended referral.     Physical therapy will be sending evaluation notes to clinic to review.     Referral may be faxed to [facility] in [place] at - [phone]    Any questions, call Julia at [phone]    Thank you,  Alex Verma, Triage RN [facility]  3:13 PM 10/18/2024

## 2024-10-22 ENCOUNTER — TELEPHONE (OUTPATIENT)
Dept: ENDOCRINOLOGY | Facility: CLINIC | Age: 44
End: 2024-10-22
Payer: COMMERCIAL

## 2024-10-22 NOTE — TELEPHONE ENCOUNTER
Patient confirmed scheduled appointment:     Date: 11/21/24  Time: 2:00 PM  Visit type: Return Med WT MGMT Nutrition  Visit mode: Virtual Visit  Provider:  Danna Dee  [place] [facility]    Additional Notes: Rescheduled from 10/29/24

## 2024-11-18 ENCOUNTER — TELEPHONE (OUTPATIENT)
Dept: FAMILY MEDICINE | Facility: CLINIC | Age: 44
End: 2024-11-18
Payer: COMMERCIAL

## 2024-11-18 DIAGNOSIS — K59.01 SLOW TRANSIT CONSTIPATION: ICD-10-CM

## 2024-11-18 RX ORDER — CALCIUM POLYCARBOPHIL 625 MG/1
TABLET, FILM COATED ORAL
Qty: 112 TABLET | Refills: 11 | Status: SHIPPED | OUTPATIENT
Start: 2024-11-18

## 2024-11-18 NOTE — TELEPHONE ENCOUNTER
Crys Gomez calls, she is patient's Care Coordinator. Calls for medication review. Reviewed medications with her with medications that have been reported to this clinic. Patient is also seen in a outside psychiatry office. She will call them as well.

## 2024-11-20 NOTE — PROGRESS NOTES
"Video-Visit Details    Type of service:  Video Visit, Jose pt care coordinator also present     Video Start Time: 2:00 PM  Video End Time:  2:19 PM     Originating Location (pt. Location): Home    Distant Location (provider location):  Offsite (providers home)     Platform used for Video Visit: [facility]    Nutrition Consultation Note    Reason For Visit: Nutrition Consultation     Kimo Greenwood is a 44 year old presenting today for return nutrition consult.   Pt is interested in laparoscopic sleeve gastrectomy.  Will proceed with MWM at this time per pt/his mother. Did NOT review any surgery dietary guidelines yet.     Pt referred by JERRY Marinelli on January 10, 2023.    Coordination note (if pursuing surgery in future)   Needs baseline labs - Done 1/9/23, would need to be re-done due to being outdated   Needs Psych eval  Needs PCP letter of support  40 lb weight loss from initial  Surgeon meet and greet with Dr. Madrigal  Needs letter of support from therapist  Needs letter of support from psychiatrist       CO-MORBIDITIES OF OBESITY INCLUDE:        1/9/2023     1:43 PM   --   I have the following health issues associated with obesity None of the above     PMH:     Per PA note:  \"Hip OA - needing hip replacement.      LOREN - uses CPAP every night. Followed by PCP      Barretts esophagus - has not had any GERD symptoms recently. Well controlled on PPI.      HTN - well controlled on medications      Degenerative disk disease - causes low back pain. 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 Followed by urology\"    Depression - has therapist/psych, hx of suicide attempt per questionnaire     Speech/Language delay    SUPPORT:      1/9/2023     1:43 PM   Support System Reviewed With Patient   Who do you have in your support network that can be available to help you for the first 2 weeks after surgery? agency   Who can you count on for support throughout your weight loss surgery journey? fully " supportive       ANTHROPOMETRICS:  Consult weight 1/9/23: 407 lbs with BMI 55.28    Estimated body mass index is 47.55 kg/m  as calculated from the following:    Height as of 10/10/24: 1.829 m (6').    Weight as of 10/10/24: 159 kg (350 lb 9.6 oz).     Current weight: 350 lbs, no updated weight on file since 10/10/24     Goal weight for hip replacement per Nya Camarena's note: 330 lbs         No data to display                    1/9/2023     1:43 PM   Weight Loss Questions Reviewed With Patient   How long have you been overweight? Since early childhood     MEDICATIONS FOR WEIGHT LOSS:  Zepbound - 15 mg dose     Hx   Saxenda -  Had been denied at first but was able to get with appeal per pt  Topiramate (ineffective)    SUPPLEMENT INFORMATION:  Vitamin D3 5,000 international unit(s)/day  B complex  Iron -  due to anemia per pt    Labs 6/19/23  Vit D WNL  TSH  WNL    Labs 6/11/24:  LDL slightly elevated    Hx of Vit D def    NUTRITION HISTORY:  Pt present today with his mom and legal guardian, Belen.     Patient is considering Bariatric surgery. Hoping to lose weight for hip replacement with goal of 330 lbs. Weight gain due to changes in diet and decrease in activity.     Wants to work on MWM first and consider surgical approach down the line as appropriate.     Worked with a RD a long time ago at [facility] for weight loss.     Mobility is very limited.   Moving into an apartment with a pool next month.     Per PA note (not yet finalized at time of writing this)    Eating 2 meals a day, with snacks. Trying to decrease pop. Meals are provided by group home. However, will go to the store to get candy, chip, cookies, crackers, ice cream, and pudding, and soda. Has increased hunger. Struggles with getting full. From exam, however [place] have increased hunger,   Breakfast - cereal with milk   Lunch - provided by group home  Dinner - pizza   Snacks - cookies, animal crackers   Pop - 1 liter of regular pop at a time.      Activity  - Hip pain limits activity. Will try to get up and walk around as much as possible. Cannot walk more then 50 yards at a time.     Please see previous RD notes for more detail    Feb 2024:  Diarrhea resolved per pt.    Saw Nya Camarena 2/22/24. Plan to stop Wegovy and transition to Zepbound. Zepbound will be coming tomorrow. Last Wegovy shot was last Wednesday.     MK also placed a pool therapy referral.     Switched pop to fresca (drinking some mtn dew still too)- limiting to 2L/week.     Eating 2 meals/day, occ snacking.   Working on smaller portions and more protein.   Main proteins lately: beef  Fruit - got a  recently so has been doing smoothies. Will do sherbet, apple juice and fruit.   Vegetables - corn a few times at [employer].   Working on making better choices when at store    Eating on salad while on visit today.     PA: limited by knee/hip pain. Does have pool in apartment building      April 2024:  Pt reports he got Gout last month- was eating a lot of steak/worley.  Went to urgent care - got steroids to help. Uric acid levels were high per pt/per chart review.    Eating 2 meals/day and a snack.     Hydration: working on less pop - has been doing fresca more often over other options. Also doing water with flavor packs    July 2024:  Doing pretty good with things he is eating. Hasn't had gout symptoms for a while. Not eating a lot of vegetables but does eat salads.    Hydration: Was doing well with pop consumption but has drinking more lately. Lately having 6 pack of 24 oz bottles and having 1 per day. Stopped drinking chocolate milk. Not drinking much water at all currently. Drinking fresca, 1-2 cans/day. Coffee and fruit smoothies.    PA: Doing pool therapy once/week. Next month would like to shoot for twice/week.    November 2024: First time meeting patient. Recently saw JERRY Pozo back in October. Goals were to do pool therapy 2x a week and food goals were to decrease Mountain Dew and eat  3 protein focused meals a day.    Trying to cut back on his sweet intake. Has switched from Mountain Dew to Pepsi (sugar content pretty much the same). Has also been drinking Gatorade and Fresca. Encouraged patient to continue to work on cutting back his intake of these beverages as he is getting a lot of sugar and calories from these beverages. Has been drinking at least 3 bottles of water day.     Continue to need to work on increasing his vegetable intake. Typically eats 1-2 meals a day. Goes to bed between 10-11 PM, Gets up between 11 and noon. Tries to eat within couple of hours of waking up. Encouraged patient to eat protein with all his meals. Discussed foods that are high in protein and the benefit to incorporating more in his diet.     PT with pool therapy 1x a week and land therapy 1x a week.     Previous goals:  Aim to reduce soda intake to 2L per week - unclear how much he is consuming, patient couldn't quantify  Aim for vegetable intake at least three times/week (broccoli, carrots, salad, bell peppers, celery) - not met, continues   Aim for 1 bottle of water/day - met  Aim to do pool therapy twice/week when able - does pool therapy 1 day and land therapy 1 day.     Additional information:  Disabled     Single         1/9/2023     1:43 PM   Recall Diet Questions Reviewed With Patient   Describe what you typically consume for lunch (typical or most recent) candy soda(8)cans to 10cans a day   Describe what you typically consume for supper (typical or most recent) pizza   How many ounces of water, or other low calorie drinks, do you drink daily (8 oz=1 glass)? 0 oz   How many ounces of caffeine (coffee, tea, pop) do you drink daily (8 oz=1 glass)? 24 oz   How many ounces of milk do you drink daily (8 oz=1 glass) 0 oz   How often do you drink alcohol? Never           1/9/2023     1:43 PM   Eating Habits   Do you have any dietary restrictions? No   Do you currently binge eat (eat a large amount of food in a  short time)? Yes   Are you an emotional eater? Yes   Do you get up to eat after falling asleep? Yes           1/9/2023     1:43 PM   Dining Out History Reviewed With Patient   How often do you dine out? Never.   Where do you dine out? (select all that apply) fast food chains   What types of food do you order when you dine out? hamburger       EXERCISE:        1/9/2023     1:43 PM   --   How often do you exercise? Never   What keeps you from being more active? Pain       NUTRITION DIAGNOSIS:  Obesity r/t long history of positive energy balance aeb BMI >30 kg/m2.    INTERVENTION:  Reviewed and modified goals  Answered pt questions  Coordination of care   Nutrition education   AVS and handouts via [employer]          1/9/2023     1:43 PM   Questions Reviewed With Patient   How ready are you to make changes regarding your weight? Number 1 = Not ready at all to make changes up to 10 = very ready. 1   How confident are you that you can change? 1 = Not confident that you will be successful making changes up to 10 = very confident. 1     Expected Engagement: fair-good (good engagement during appts however pt rated confidence as a 1 per questionnaire)     GOALS:  1) Continue to work on reducing sugar sweetened beverages (Pepsi, Fresca, Gatorade) 2) Aim for vegetable intake at least three times/week (broccoli, carrots, salad, bell peppers, celery)  3) Continue to increase water consumption, aim for 64 oz daily   4) Continue with pool and land therapy weekly     RESOURCES:     Plate method can be used for general guidance on balanced meals/portion sizes (see link below for picture/more information)                 Make 1/2 your plate non starchy vegetables (cauliflower, broccoli, asparagus, [place] sprouts, lettuce, carrots, for example).                5+ oz lean protein sources (salmon/skinless chicken/turkey breast/pork loin/lean cuts of beef/ or non-animal protein such as black, kidney or kuo beans, tofu/edamame/tempeh)      (Note: 3 oz = deck of cards size)                 ~1 cup carbohydrate choices such as whole grain starches or starchy vegetables or fruit. For example: quinoa, brown rice, barley, potatoes, sweet potatoes, winter squash, peas, corn, or fruit.               Choose ~0-2 added fat servings at a meal (avocados, nut butters, nuts or seeds, olive oil, vegetable oils).    The Plate Method:  https://www.cdc.gov/diabetes/images/managing/Diabetes-Manage-Eat-Well-Plate-Graphic_600px.jpg    Building a Plate  [email or web address]    Protein Sources for Weight Loss  [email or web address]     Carbohydrates  [email or web address]     Mindful Eating  [email or web address]     Summary of Volumetrics Eating Plan  [email or web address]     Seated Exercises for Arms and Legs (can be done before or after surgery)  [email or web address]    Follow up: with Aydee Monahan in 1-2 months.     Time spent with patient: 19 minutes.    Danna Dee RD, LD  Clinic # [phone]

## 2024-11-21 ENCOUNTER — VIRTUAL VISIT (OUTPATIENT)
Dept: ENDOCRINOLOGY | Facility: CLINIC | Age: 44
End: 2024-11-21
Payer: COMMERCIAL

## 2024-11-21 DIAGNOSIS — E66.813 CLASS 3 SEVERE OBESITY DUE TO EXCESS CALORIES WITH SERIOUS COMORBIDITY AND BODY MASS INDEX (BMI) OF 50.0 TO 59.9 IN ADULT (H): ICD-10-CM

## 2024-11-21 DIAGNOSIS — E66.01 CLASS 3 SEVERE OBESITY DUE TO EXCESS CALORIES WITH SERIOUS COMORBIDITY AND BODY MASS INDEX (BMI) OF 50.0 TO 59.9 IN ADULT (H): ICD-10-CM

## 2024-11-21 DIAGNOSIS — Z71.3 NUTRITIONAL COUNSELING: Primary | ICD-10-CM

## 2024-11-21 NOTE — NURSING NOTE
Current patient location: [address]   [facility] [address]    Is the patient currently in the state of [place]? YES    Visit mode:VIDEO    If the visit is dropped, the patient can be reconnected by: VIDEO VISIT: Text to cell phone:   Telephone Information:   Mobile [phone]       Will anyone else be joining the visit? NO  (If patient encounters technical issues they should call [phone])    Are changes needed to the allergy or medication list? Pt stated no changes to allergies and Pt stated no med changes    Are refills needed on medications prescribed by this physician? NO    Reason for visit: RECHECK    Stacie EIDF    Checked pt in/rooming with pt care coordinator Jose. Pt is present also.

## 2024-11-21 NOTE — PATIENT INSTRUCTIONS
Robert Lamar,     Follow-up with Aydee Monahan in 1-2 months.     Thank you,    Danna Dee, BENSON, LD  If you would like to schedule or reschedule an appointment with the RD, please call [phone]    Nutrition Goals  1) Continue to work on reducing sugar sweetened beverages (Pepsi, Fresca, Gatorade) 2) Aim for vegetable intake at least three times/week (broccoli, carrots, salad, bell peppers, celery)  3) Continue to increase water consumption, aim for 64 oz daily   4) Continue with pool and land therapy weekly     RESOURCES:     Plate method can be used for general guidance on balanced meals/portion sizes (see link below for picture/more information)                 Make 1/2 your plate non starchy vegetables (cauliflower, broccoli, asparagus, [place] sprouts, lettuce, carrots, for example).                5+ oz lean protein sources (salmon/skinless chicken/turkey breast/pork loin/lean cuts of beef/ or non-animal protein such as black, kidney or kuo beans, tofu/edamame/tempeh)     (Note: 3 oz = deck of cards size)                 ~1 cup carbohydrate choices such as whole grain starches or starchy vegetables or fruit. For example: quinoa, brown rice, barley, potatoes, sweet potatoes, winter squash, peas, corn, or fruit.               Choose ~0-2 added fat servings at a meal (avocados, nut butters, nuts or seeds, olive oil, vegetable oils).    The Plate Method:  https://www.cdc.gov/diabetes/images/managing/Diabetes-Manage-Eat-Well-Plate-Graphic_600px.jpg    Building a Plate  [email or web address]    Protein Sources for Weight Loss  [email or web address]     Carbohydrates  [email or web address]     Mindful Eating  [email or web address]://[email or web address]     Summary of Volumetrics Eating Plan  [email or web address]     Seated Exercises for Arms and Legs (can be done before or after surgery)  [email or web address]    COMPREHENSIVE WEIGHT MANAGEMENT PROGRAM  VIRTUAL SUPPORT GROUPS    At  "[facility], our Comprehensive Weight Management program offers on-line support groups for patients who are working on weight loss and considering, preparing for, or have had weight loss surgery.     There is no cost for this opportunity.  You are invited to attend the?Virtual Support Groups?provided by any of the following locations:    [facility] via Microsoft Teams with Gypsy Schmidt RD, RN  2.   [place] via [employer] with Rafat Ayoub, PhD, LP  3.   [place] via [employer] with Jacklyn Jones RN  4.   [facility] via a Zoom Meeting with MAMIE Dunaway-    The following Support Group information can also be found on our website:  [email or web address]      [facility]   WEIGHT LOSS SURGERY SUPPORT GROUP  The support group is a patient-lead forum that meets monthly to share experiences, encouragement and education. It is open to those who have had weight loss surgery, are scheduled for surgery, or are considering surgery.   WHEN: 3rd Wednesday of each month from 5:00PM - 6:00PM using Microsoft Teams.   FACILITATOR: Led by Gypsy Fermin RD, LD, RN, the program's Clinical Coordinator.   TO REGISTER: Please contact the clinic via YouMail or call the nurse line directly at [phone] to inform our staff that you would like an invite sent to you and to let us know the email you would like the invite sent to. Prior to the meeting, a link with directions on how to join the meeting will be sent to you.    2024 Meetings    September 18: Kasey Guajardo, PhD, Outpatient Clinical Therapist, \"Pro Tips for Habit Change\".  October 16:  Let's Talk  This will be a time of group support.??   November 20:  Let's Talk  about How to Navigate the Upcoming Holiday Season.  December 18:  Let's Talk  and Celebrate the past year.       [facility]  CONNECTIONS BARIATRIC " "CARE SUPPORT GROUP  This is open to all pre- and post- operative bariatric surgery patients as well as their support system.   WHEN: 3rd Tuesday of each month from 6:30 PM - 8:00 PM using Microsoft Teams.   FACILITATOR: Led by Rafat Ayoub, Ph.D who is a Licensed Psychologist with the [facility] Comprehensive Weight Management Program.   TO REGISTER: Please send an email to Rafat Ayoub, Ph.D., LP at?[email or web address]?if you would like an invitation to the group. Prior to the meeting, a link with directions on how to join the meeting will be sent to you.    2024 Meetings September 17: IN PERSON MEETING. [facility], [facility] [place], [address], 1st floor Conference Room.  October 15: Date changed to OCTOBER 8th (2nd Tuesday).   November 19: \"Holiday Eating\", Tasha Copeland RD, LD.  December 17: \"Hospital Stay and Compliance\", Jacklyn Jones RN, CBN.      [facility] POST-OPERATIVE BARIATRIC SURGERY SUPPORT GROUP  This is a support group for [facility] bariatric patients (and those external to [facility]) who have had bariatric surgery and are at least 3 months post-surgery.  WHEN: 4th Thursday of the month from 11:00 AM - 12:00 PM using Microsoft Teams.   FACILITATOR: Led by Certified Bariatric Nurse, Jacklyn Jones RN.   TO REGISTER: Please send an email to Jacklyn at [email or web address] if you would like an invitation to the group.  Prior to the meeting, a link with directions on how to join the meeting will be sent to you.    2024 Meetings September 26 October 24 November 28: No meeting  December 26    [facility]   HEALTHY LIFESTYLE COACHING GROUP  This is a 60 minute virtual coaching group for those who want to lead a healthier lifestyle. Come together to set goals and overcome barriers in a supportive group environment. We will address the four " pillars of health: nutrition, exercise, sleep, and emotional well-being. This group is highly recommended for those who are participating in the 24 week Healthy Lifestyle Plan and our Health Coaching sessions.   WHEN: 1st Friday of the month, 12:30 PM - 1:30 PM   using a Zoom meeting.     FACILITATOR: Led by National Board-Certified Health and , Jacklyn Benedict, [facility].  TO REGISTER: Please call the [employer] at [phone] to register. You will get an appointment to attend in [facility]. Fifteen minutes prior to the meeting, complete the e-check in and you will get the link to join the meeting.  There is no charge to attend this group and space is limited.  Please register for each month you wish to attend    2024 Meetings  September 5 No meeting.  October 4 November 1 December 6

## 2024-11-21 NOTE — LETTER
"11/21/2024       RE: Kimo Greenwood  [address]  [facility] [address]     Dear Colleague,    Thank you for referring your patient, Kimo Greenwood, to the [facility]. Please see a copy of my visit note below.    Video-Visit Details    Type of service:  Video Visit, Jose pt care coordinator also present     Video Start Time: 2:00 PM  Video End Time:  2:19 PM     Originating Location (pt. Location): Home    Distant Location (provider location):  Offsite (providers home)     Platform used for Video Visit: [employer]    Nutrition Consultation Note    Reason For Visit: Nutrition Consultation     Kimo Greenwood is a 44 year old presenting today for return nutrition consult.   Pt is interested in laparoscopic sleeve gastrectomy.  Will proceed with MWM at this time per pt/his mother. Did NOT review any surgery dietary guidelines yet.     Pt referred by JERRY Marinelli on January 10, 2023.    Coordination note (if pursuing surgery in future)   Needs baseline labs - Done 1/9/23, would need to be re-done due to being outdated   Needs Psych eval  Needs PCP letter of support  40 lb weight loss from initial  Surgeon meet and greet with Dr. Madrigal  Needs letter of support from therapist  Needs letter of support from psychiatrist       CO-MORBIDITIES OF OBESITY INCLUDE:        1/9/2023     1:43 PM   --   I have the following health issues associated with obesity None of the above     PMH:     Per PA note:  \"Hip OA - needing hip replacement.      LOREN - uses CPAP every night. Followed by PCP      Barretts esophagus - has not had any GERD symptoms recently. Well controlled on PPI.      HTN - well controlled on medications      Degenerative disk disease - causes low back pain. 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 Followed by urology\"    Depression - has therapist/psych, hx of suicide " attempt per questionnaire     Speech/Language delay    SUPPORT:      1/9/2023     1:43 PM   Support System Reviewed With Patient   Who do you have in your support network that can be available to help you for the first 2 weeks after surgery? agency   Who can you count on for support throughout your weight loss surgery journey? fully supportive       ANTHROPOMETRICS:  Consult weight 1/9/23: 407 lbs with BMI 55.28    Estimated body mass index is 47.55 kg/m  as calculated from the following:    Height as of 10/10/24: 1.829 m (6').    Weight as of 10/10/24: 159 kg (350 lb 9.6 oz).     Current weight: 350 lbs, no updated weight on file since 10/10/24     Goal weight for hip replacement per Nya Camarena's note: 330 lbs         No data to display                    1/9/2023     1:43 PM   Weight Loss Questions Reviewed With Patient   How long have you been overweight? Since early childhood     MEDICATIONS FOR WEIGHT LOSS:  Zepbound - 15 mg dose     Hx   Saxenda -  Had been denied at first but was able to get with appeal per pt  Topiramate (ineffective)    SUPPLEMENT INFORMATION:  Vitamin D3 5,000 international unit(s)/day  B complex  Iron -  due to anemia per pt    Labs 6/19/23  Vit D WNL  TSH  WNL    Labs 6/11/24:  LDL slightly elevated    Hx of Vit D def    NUTRITION HISTORY:  Pt present today with his mom and legal guardian, Belen.     Patient is considering Bariatric surgery. Hoping to lose weight for hip replacement with goal of 330 lbs. Weight gain due to changes in diet and decrease in activity.     Wants to work on MWM first and consider surgical approach down the line as appropriate.     Worked with a RD a long time ago at [facility] for weight loss.     Mobility is very limited.   Moving into an apartment with a pool next month.     Per PA note (not yet finalized at time of writing this)    Eating 2 meals a day, with snacks. Trying to decrease pop. Meals are provided by group home. However, will go to the store to get  candy, chip, cookies, crackers, ice cream, and pudding, and soda. Has increased hunger. Struggles with getting full. From exam, however Willard have increased hunger,   Breakfast - cereal with milk   Lunch - provided by group home  Dinner - pizza   Snacks - cookies, animal crackers   Pop - 1 liter of regular pop at a time.      Activity - Hip pain limits activity. Will try to get up and walk around as much as possible. Cannot walk more then 50 yards at a time.     Please see previous RD notes for more detail    Feb 2024:  Diarrhea resolved per pt.    Saw Nya Camarena 2/22/24. Plan to stop Wegovy and transition to Zepbound. Zepbound will be coming tomorrow. Last Wegovy shot was last Wednesday.     MK also placed a pool therapy referral.     Switched pop to fresca (drinking some mtn dew still too)- limiting to 2L/week.     Eating 2 meals/day, occ snacking.   Working on smaller portions and more protein.   Main proteins lately: beef  Fruit - got a  recently so has been doing smoothies. Will do sherbet, apple juice and fruit.   Vegetables - corn a few times at [employer].   Working on making better choices when at store    Eating on salad while on visit today.     PA: limited by knee/hip pain. Does have pool in apartment building      April 2024:  Pt reports he got Gout last month- was eating a lot of steak/worley.  Went to urgent care - got steroids to help. Uric acid levels were high per pt/per chart review.    Eating 2 meals/day and a snack.     Hydration: working on less pop - has been doing fresca more often over other options. Also doing water with flavor packs    July 2024:  Doing pretty good with things he is eating. Hasn't had gout symptoms for a while. Not eating a lot of vegetables but does eat salads.    Hydration: Was doing well with pop consumption but has drinking more lately. Lately having 6 pack of 24 oz bottles and having 1 per day. Stopped drinking chocolate milk. Not drinking much water at all  currently. Drinking fresca, 1-2 cans/day. Coffee and fruit smoothies.    PA: Doing pool therapy once/week. Next month would like to shoot for twice/week.    November 2024: First time meeting patient. Recently saw JERRY Pozo back in October. Goals were to do pool therapy 2x a week and food goals were to decrease Mountain Dew and eat 3 protein focused meals a day.    Trying to cut back on his sweet intake. Has switched from Mountain Dew to Pepsi (sugar content pretty much the same). Has also been drinking Gatorade and Fresca. Encouraged patient to continue to work on cutting back his intake of these beverages as he is getting a lot of sugar and calories from these beverages. Has been drinking at least 3 bottles of water day.     Continue to need to work on increasing his vegetable intake. Typically eats 1-2 meals a day. Goes to bed between 10-11 PM, Gets up between 11 and noon. Tries to eat within couple of hours of waking up. Encouraged patient to eat protein with all his meals. Discussed foods that are high in protein and the benefit to incorporating more in his diet.     PT with pool therapy 1x a week and land therapy 1x a week.     Previous goals:  Aim to reduce soda intake to 2L per week - unclear how much he is consuming, patient couldn't quantify  Aim for vegetable intake at least three times/week (broccoli, carrots, salad, bell peppers, celery) - not met, continues   Aim for 1 bottle of water/day - met  Aim to do pool therapy twice/week when able - does pool therapy 1 day and land therapy 1 day.     Additional information:  Disabled     Single         1/9/2023     1:43 PM   Recall Diet Questions Reviewed With Patient   Describe what you typically consume for lunch (typical or most recent) candy soda(8)cans to 10cans a day   Describe what you typically consume for supper (typical or most recent) pizza   How many ounces of water, or other low calorie drinks, do you drink daily (8 oz=1 glass)? 0 oz   How  many ounces of caffeine (coffee, tea, pop) do you drink daily (8 oz=1 glass)? 24 oz   How many ounces of milk do you drink daily (8 oz=1 glass) 0 oz   How often do you drink alcohol? Never           1/9/2023     1:43 PM   Eating Habits   Do you have any dietary restrictions? No   Do you currently binge eat (eat a large amount of food in a short time)? Yes   Are you an emotional eater? Yes   Do you get up to eat after falling asleep? Yes           1/9/2023     1:43 PM   Dining Out History Reviewed With Patient   How often do you dine out? Never.   Where do you dine out? (select all that apply) fast food chains   What types of food do you order when you dine out? hamburger       EXERCISE:        1/9/2023     1:43 PM   --   How often do you exercise? Never   What keeps you from being more active? Pain       NUTRITION DIAGNOSIS:  Obesity r/t long history of positive energy balance aeb BMI >30 kg/m2.    INTERVENTION:  Reviewed and modified goals  Answered pt questions  Coordination of care   Nutrition education   AVS and handouts via [employer]          1/9/2023     1:43 PM   Questions Reviewed With Patient   How ready are you to make changes regarding your weight? Number 1 = Not ready at all to make changes up to 10 = very ready. 1   How confident are you that you can change? 1 = Not confident that you will be successful making changes up to 10 = very confident. 1     Expected Engagement: fair-good (good engagement during appts however pt rated confidence as a 1 per questionnaire)     GOALS:  1) Continue to work on reducing sugar sweetened beverages (Pepsi, Fresca, Gatorade) 2) Aim for vegetable intake at least three times/week (broccoli, carrots, salad, bell peppers, celery)  3) Continue to increase water consumption, aim for 64 oz daily   4) Continue with pool and land therapy weekly     RESOURCES:     Plate method can be used for general guidance on balanced meals/portion sizes (see link below for picture/more  information)                 Make 1/2 your plate non starchy vegetables (cauliflower, broccoli, asparagus, [place] sprouts, lettuce, carrots, for example).                5+ oz lean protein sources (salmon/skinless chicken/turkey breast/pork loin/lean cuts of beef/ or non-animal protein such as black, kidney or kuo beans, tofu/edamame/tempeh)     (Note: 3 oz = deck of cards size)                 ~1 cup carbohydrate choices such as whole grain starches or starchy vegetables or fruit. For example: quinoa, brown rice, barley, potatoes, sweet potatoes, winter squash, peas, corn, or fruit.               Choose ~0-2 added fat servings at a meal (avocados, nut butters, nuts or seeds, olive oil, vegetable oils).    The Plate Method:  https://www.cdc.gov/diabetes/images/managing/Diabetes-Manage-Eat-Well-Plate-Graphic_600px.jpg    Building a Plate  [email or web address]    Protein Sources for Weight Loss  [email or web address]     Carbohydrates  [email or web address]     Mindful Eating  [email or web address]     Summary of Volumetrics Eating Plan  [email or web address]     Seated Exercises for Arms and Legs (can be done before or after surgery)  [email or web address]    Follow up: with Aydee Monahan in 1-2 months.     Time spent with patient: 19 minutes.    Danna Dee RD,   Clinic # [phone]        Again, thank you for allowing me to participate in the care of your patient.      Sincerely,    Danna Dee RD

## 2024-11-25 ENCOUNTER — TELEPHONE (OUTPATIENT)
Dept: ENDOCRINOLOGY | Facility: CLINIC | Age: 44
End: 2024-11-25
Payer: COMMERCIAL

## 2024-11-25 NOTE — TELEPHONE ENCOUNTER
Left Voicemail (1st Attempt) and Sent Mychart (1st Attempt) for the patient to call back and schedule the following:    Appointment type: Return Bariatric Nutrition  Appointment mode: Virtual Visit  Provider: Aydee Monahan  Return date: Approx. 1/21/25  Specialty phone number: [phone]    Additional Notes: Appt requested by Danna Dee, but patient had established earlier with Aydee Monahan.

## 2024-12-02 ENCOUNTER — TRANSFERRED RECORDS (OUTPATIENT)
Dept: HEALTH INFORMATION MANAGEMENT | Facility: CLINIC | Age: 44
End: 2024-12-02
Payer: COMMERCIAL

## 2024-12-09 ENCOUNTER — PATIENT OUTREACH (OUTPATIENT)
Dept: CARE COORDINATION | Facility: CLINIC | Age: 44
End: 2024-12-09

## 2024-12-09 ENCOUNTER — VIRTUAL VISIT (OUTPATIENT)
Dept: FAMILY MEDICINE | Facility: CLINIC | Age: 44
End: 2024-12-09
Payer: COMMERCIAL

## 2024-12-09 DIAGNOSIS — F17.200 NICOTINE USE DISORDER: Primary | ICD-10-CM

## 2024-12-09 PROCEDURE — 99213 OFFICE O/P EST LOW 20 MIN: CPT | Mod: 95 | Performed by: FAMILY MEDICINE

## 2024-12-09 RX ORDER — VARENICLINE TARTRATE 1 MG/1
1 TABLET, FILM COATED ORAL 2 TIMES DAILY
Qty: 60 TABLET | Refills: 3 | Status: SHIPPED | OUTPATIENT
Start: 2025-01-09

## 2024-12-09 RX ORDER — POLYETHYLENE GLYCOL 3350 17 G
2 POWDER IN PACKET (EA) ORAL
Qty: 60 LOZENGE | Refills: 2 | Status: SHIPPED | OUTPATIENT
Start: 2024-12-09

## 2024-12-09 RX ORDER — VARENICLINE TARTRATE 0.5 (11)-1
KIT ORAL
Qty: 53 TABLET | Refills: 0 | Status: SHIPPED | OUTPATIENT
Start: 2024-12-09

## 2024-12-09 NOTE — PROGRESS NOTES
Willard is a 44 year old who is being evaluated via a billable video visit.    How would you like to obtain your AVS? MyChart  If the video visit is dropped, the invitation should be resent by: Send to e-mail at: [email or web address]  Will anyone else be joining your video visit? Yes: Not Required. How would they like to receive their invitation? Other e-mail: N/A      Assessment & Plan     Nicotine use disorder  Advise quit date in less than 2 months, start Chantix and lozenge nicotine replacement therapy.  - varenicline (CHANTIX WENDI) 0.5 MG X 11 & 1 MG X 42 tablet  Dispense: 53 tablet; Refill: 0  - nicotine (COMMIT) 2 MG lozenge  Dispense: 60 lozenge; Refill: 2  - Primary Care - Care Coordination Referral  - varenicline (CHANTIX) 1 MG tablet  Dispense: 60 tablet; Refill: 3              Subjective   Willard is a 44 year old, presenting for the following health issues:  Recheck Medication (Follow up on his medications, briefly started taking but then stopped his Chantix. )        12/9/2024    12:53 PM   Additional Questions   Accompanied by Crys - Care Coordniator     Video Start Time: 1:00 PM    HPI     Medication Followup of Chantix    Taking Medication as prescribed: NO  Side Effects:  None  Medication Helping Symptoms:  not applicable    Patient would like to restart Chantix and start oral medications for nicotine replacement.  Has upcoming hip surgery in 3 months and wants to be off all tobacco products.      Objective    Vitals - Patient Reported  Weight (Patient Reported): 156.5 kg (345 lb)  Height (Patient Reported): 182.9 cm (6')  BMI (Based on Pt Reported Ht/Wt): 46.79  Pain Score: No Pain (0)        Physical Exam   GENERAL: alert and no distress  EYES: Eyes grossly normal to inspection.  No discharge or erythema, or obvious scleral/conjunctival abnormalities.  RESP: No audible wheeze, cough, or visible cyanosis.    SKIN: Visible skin clear. No significant rash, abnormal pigmentation or lesions.  NEURO:  Cranial nerves grossly intact.  Mentation and speech appropriate for age.  PSYCH: Appropriate affect, tone, and pace of words          Video-Visit Details    Type of service:  Video Visit   Video End Time:1:15 PM  Originating Location (pt. Location): Home    Distant Location (provider location):  On-site  Platform used for Video Visit: [employer]  Signed Electronically by: Ciro Love MD

## 2024-12-09 NOTE — LETTER
[facility] CARE COORDINATION  [address]  [facility] [address]     December 9, 2024    Kimo Greenwood  [address]   [place] [address]      Dear Willard (attention to staff, Jose Mckeon),    I am a clinic community health worker who works with Ciro Love MD with the [facility]. I wanted to introduce myself and provide you with my contact information for you to be able to call me with any questions or concerns. I also wanted to thank you for spending the time to talk with me.      Below is a description of clinic care coordination and how I can further assist you.       The clinic care coordination team is made up of a registered nurse, , financial resource worker and community health worker who understand the health care system. The goal of clinic care coordination is to help you manage your health and improve access to the health care system. Our team works alongside your provider to assist you in determining your health and social needs. We can help you obtain health care and community resources, providing you with necessary information and education. We can work with you through any barriers and develop a care plan that helps coordinate and strengthen the communication between you and your care team.  Our services are voluntary and are offered without charge to you personally.    Please feel free to contact me with any questions or concerns regarding care coordination and what we can offer.      We are focused on providing you with the highest-quality healthcare experience possible.    Sincerely,     Luisa Gama  Community Health Worker   [email or web address]   Clinic Care Coordination / Ambulatory Care Management  [facility], and [facility]  [email or web address]  Office: [phone]  Gender Pronouns: She/her/hers  Employed by [facility] [employer]      Enclosed: Clinic Care Coordination Information        WHAT IS CARE COORDINATION?      [facility] Care Coordination supports patients and families dealing with chronic or complex health conditions, developmental issues, and social service needs. This service is available to patients of all ages, from babies to seniors. When you're facing a difficult decision about caring for yourself or someone you love, we can help you understand your options. We identify and refer you to community resources that help with financial, legal, mental health, transportation, and other issues. We also help with your medical and related education needs.      IS CARE COORDINATION RIGHT FOR ME?      Discuss a referral to Care Coordination with your primary care provider or care team member.      HOW CAN I CONNECT WITH CARE COORDINATION?      Contact your clinic.   Speak with your doctor or clinic staff.   Discuss care coordination with hospital staff before discharge.         MEET YOUR CARE COORDINATION TEAM      Registered Nurse Care Coordinator      Provides education on medications, disease management, and new diagnoses.   Addresses concerns about medical conditions and connects you to resources.   Develops patient-centered goals in collaboration with your care team and community partners.      Social Work Care Coordinator      Supports you with mental health concerns and psychosocial needs.   Connects you to a variety of community-based resources.   Develops patient-centered goals in collaboration with your care team and community partners.      Community Health Worker      Identifies health and social barriers and connects you with community resources.   Develops nonclinical patient-centered goals in collaboration with your care team and community partners.   Enhances communication between patients and care teams.      Financial Resource Worker      Assists you with applying for county-based health insurance and other benefits.   Connects you with [facility]  Care.

## 2024-12-09 NOTE — Clinical Note
NATHAN Ackerman initial outreach completed. Spoke with Jose Mckeon (staff) (C2C on file).   Patient has already established care with counselors and has needed medications for his mental health/wellness. No mental health resources needed.   He has care coordination services through an agency that supports him, so all he needs is information/resources for nicotine/tobacco use disorder support group. Please follow up and send the information/resources for support group to [email or web address].   Thank you,  Luisa

## 2024-12-09 NOTE — PROGRESS NOTES
Clinic Care Coordination Contact  Community Health Worker Initial Outreach    CHW spoke with Jose Mckeon (staff) (C2C on file).     CHW Initial Information Gathering:  Referral Source: PCP  Preferred Hospital: [facility], [place]  [phone]  Preferred Urgent Care: [facility] [place], [phone]  Current living arrangement:: I live alone (Unemployed)  Type of residence:: Apartment  Community Resources: [facility] Programs, Home Care, OP Mental Health (SNAP/EBT, food shelves, individualized in-home support through Developmental Disabilities Waiver (coordination of care, medications, day-to-day independent living support))  Equipment Currently Used at Home: cane, straight, walker, rolling, other (see comments) (rolling walker with a seat, CPAP machine)  Informal Support system:: Family, Parent (brother, sister, relatives occasionally)  No PCP office visit in Past Year: No  Transportation means:: Other (Lift. Has license but no car. No $ to afford car or car insurance)  CHW Additional Questions  If ED/Hospital discharge, follow-up appointment scheduled as recommended?: N/A  Medication changes made following ED/Hospital discharge?: N/A  MyChart active?: Yes  Patient sent Social Drivers of Health questionnaire?: No    Patient accepts CC: No, requested resources (information/resources for nicotine/tobacco use disorder support group) provided to patient and Jose Mckeon (staff) via email to [email or web address] by  CC and no ongoing support from CCC team needed as he has CC through an agency that supports him.     Patient will be sent Care Coordination introduction letter for future reference.     Luisa Gama  Community Health Worker   [email or web address]   Clinic Care Coordination / Ambulatory Care Management  [facility], and [facility]  [email or web address]  Office: [phone]  Gender Pronouns: She/her/hers  Employed by [facility]  Services

## 2024-12-10 ENCOUNTER — ALLIED HEALTH/NURSE VISIT (OUTPATIENT)
Dept: PEDIATRICS | Facility: CLINIC | Age: 44
End: 2024-12-10
Payer: COMMERCIAL

## 2024-12-10 DIAGNOSIS — F10.10 ALCOHOL USE DISORDER, MILD, ABUSE: Primary | ICD-10-CM

## 2024-12-10 PROCEDURE — 99207 PR NO CHARGE NURSE ONLY: CPT

## 2024-12-10 RX ADMIN — CYANOCOBALAMIN 1000 MCG: 1000 INJECTION, SOLUTION INTRAMUSCULAR; SUBCUTANEOUS at 13:45

## 2024-12-10 NOTE — PROGRESS NOTES
Clinic Administered Medication Documentation      Injectable Medication Documentation    Is there an active order (written within the past 365 days, with administrations remaining, not ) in the chart? Yes.     Patient was given Cyanocobalamin (B-12). Prior to medication administration, verified patient's identity using patient s name and date of birth. Please see MAR and medication order for additional information. Patient instructed to remain in clinic for 15 minutes and report any adverse reaction to staff immediately.    Vial/Syringe: Single dose vial. Was entire vial of medication used? Yes  Was this medication supplied by the patient? No  Is this a medication the patient will need to receive again? No - not necessary to check for refills remaining.  Che Holloway LPN

## 2024-12-10 NOTE — PROGRESS NOTES
Clinic Care Coordination Contact  Brief Entry:     CHW completed outreach to pt yesterday, Mon 12/09/24 and spoke with ODALIS Garcia on file. Per CHW, Jose requested that CC sent tobacco cessation resources to pt email and denied additional needs.     CC called pt today. Jose answered. CC discussed request and discussed that sending information via email is not secure. Jose gave verbal permission for information to be sent. Jose again denied additional needs. Encouraged Jose and pt to call back if additional needs arise. CC sent the following information for review:     https://[employer] [email or web address].org/resources/healthlibrary  Tobacco Cessation  American Lung Association  Online Support Communities  American Lung Association  Quit Partner - Free Help to Quit Your Way  Find Meeting  Nicotine Anonymous    VIVIAN Rojas  [facility]   Primary Care Social Work Care Coordinator  [facility] [place]   Phone: [phone]

## 2024-12-19 DIAGNOSIS — K21.9 LPRD (LARYNGOPHARYNGEAL REFLUX DISEASE): ICD-10-CM

## 2024-12-19 DIAGNOSIS — I10 ESSENTIAL HYPERTENSION: ICD-10-CM

## 2024-12-19 DIAGNOSIS — K29.80 DUODENITIS: ICD-10-CM

## 2024-12-19 RX ORDER — HYDROCHLOROTHIAZIDE 25 MG/1
25 TABLET ORAL DAILY
Qty: 28 TABLET | Refills: 0 | Status: SHIPPED | OUTPATIENT
Start: 2024-12-19

## 2024-12-19 RX ORDER — PANTOPRAZOLE SODIUM 40 MG/1
TABLET, DELAYED RELEASE ORAL
Qty: 28 TABLET | Refills: 10 | Status: SHIPPED | OUTPATIENT
Start: 2024-12-19

## 2025-01-06 DIAGNOSIS — I10 ESSENTIAL HYPERTENSION: ICD-10-CM

## 2025-01-06 RX ORDER — HYDROCHLOROTHIAZIDE 25 MG/1
25 TABLET ORAL DAILY
Qty: 30 TABLET | Refills: 0 | Status: SHIPPED | OUTPATIENT
Start: 2025-01-06

## 2025-01-13 ENCOUNTER — TELEPHONE (OUTPATIENT)
Dept: FAMILY MEDICINE | Facility: CLINIC | Age: 45
End: 2025-01-13
Payer: COMMERCIAL

## 2025-01-13 NOTE — TELEPHONE ENCOUNTER
Crys, Care Coordinator calls back. States the [facility] had asked about the patient getting antibiotics. As far as Crys knows he has not had heart or dental infections. Though he does have significant decay, require extensive dental work.  He also has upcoming surgery on 3/17/25, she is wondering if he needs prophylactic antibiotics for this.   Pt's Guardian also notes on patient's my chart, he is needing pneumonia vaccine. He received one in  1/22/21, is he needing a 2nd injection? He is outside the usual parameters as he is not over 50.   Will forward to PCP.

## 2025-01-13 NOTE — TELEPHONE ENCOUNTER
No clear indication for antibiotic prophylaxis, please confirm with patient no history of post dental or heart infections      ZG

## 2025-01-13 NOTE — TELEPHONE ENCOUNTER
Jose Mckeon, patient's care coordinator calling to ask primary care provider if patient should be using antibiotics before dental visits.  He is currently going to dentist weekly for advanced decay, having extractions and fillings and has already had 3 visits.  Next visit 1/16/25.  Weekly dental visits will most likely continue until his hip surgery which is scheduled for 3/17/25. ANNIE Neumann R.N.

## 2025-01-16 ENCOUNTER — MYC MEDICAL ADVICE (OUTPATIENT)
Dept: FAMILY MEDICINE | Facility: CLINIC | Age: 45
End: 2025-01-16

## 2025-01-23 ENCOUNTER — OFFICE VISIT (OUTPATIENT)
Dept: ENDOCRINOLOGY | Facility: CLINIC | Age: 45
End: 2025-01-23
Payer: COMMERCIAL

## 2025-01-23 VITALS
WEIGHT: 315 LBS | BODY MASS INDEX: 42.66 KG/M2 | HEIGHT: 72 IN | DIASTOLIC BLOOD PRESSURE: 62 MMHG | OXYGEN SATURATION: 98 % | SYSTOLIC BLOOD PRESSURE: 100 MMHG | HEART RATE: 64 BPM

## 2025-01-23 DIAGNOSIS — E66.813 CLASS 3 SEVERE OBESITY DUE TO EXCESS CALORIES WITH SERIOUS COMORBIDITY AND BODY MASS INDEX (BMI) OF 50.0 TO 59.9 IN ADULT (H): ICD-10-CM

## 2025-01-23 DIAGNOSIS — E66.01 CLASS 3 SEVERE OBESITY DUE TO EXCESS CALORIES WITH SERIOUS COMORBIDITY AND BODY MASS INDEX (BMI) OF 50.0 TO 59.9 IN ADULT (H): ICD-10-CM

## 2025-01-23 ASSESSMENT — PAIN SCALES - GENERAL: PAINLEVEL_OUTOF10: SEVERE PAIN (7)

## 2025-01-23 NOTE — NURSING NOTE
Chief Complaint   Patient presents with    Follow Up     Return [facility]       Vitals:    01/23/25 1422   BP: 100/62   BP Location: Left arm   Patient Position: Chair   Cuff Size: Adult Large   Pulse: 64   SpO2: 98%   Weight: (!) 155.9 kg (343 lb 12.8 oz)   Height: 1.829 m (6')       Body mass index is 46.63 kg/m .

## 2025-01-23 NOTE — PROGRESS NOTES
Return Medical Weight Management Note     Kimo Greenwood  MRN:  [id number]  :  1980  SALEEM:  2025    Dear Ciro oLve MD,    I had the pleasure of seeing your patient Kimo Greenwood. He is a 44 year old male who I am continuing to see for treatment of obesity related to:        2023     1:43 PM   --   I have the following health issues associated with obesity None of the above       Assessment & Plan   Problem List Items Addressed This Visit       Class 3 severe obesity with serious comorbidity and body mass index (BMI) of 50.0 to 59.9 in adult (H)    Relevant Medications    tirzepatide-Weight Management (ZEPBOUND) 12.5 MG/0.5ML prefilled pen      Continue Zepbound 12.5mg once weekly. Refills sent   Nya Camarena in 5 months      INTERVAL HISTORY:  First seen for NBS - 2023  Continued with MWM, concerned for post op diet.   Weight loss required for hip replacement <330lbs  Transitioned to Wegovy 1.0mg   3/13/23 - continued Wegovy 1.0mg, goal of decreasing pop to 1-2 bottles daily   Reached out via [employer] 23 - increased Wegovy 1.7mg   23 - continued Wegovy 1.7mg   2023- continued wegovy 1.7, was finding it helpful with hunger and weight loss    Reached out via [employer] and increased Wegovy 2.4mg    Tried to transition to zepbound, but was not on formulary  Transitioned to Zepbound        Working on smoking cessation - going down on nicotine in vape. Goal to start chantex next week to be 6 weeks nicotine free prior to his surgery.     Hip surgery scheduled 3/17/2025!    Anti-obesity medication history    Current:   Zepbound 12.5mg - continues to be helpful. No side effects.       Recent diet changes: switched from Mountain Dew to pepsi - only having 1-2 a day. Hunger well controlled. Working on consistently eating meals. Snacking on string cheese, yogurt, cottage cheese. Working on increase protein. Decrease in ice cream.     Recent exercise/activity changes: active is limited  with hip. Going to pool therapy 1xweek and PT 1xmonth. Has found a really good fit at [facility]. Excited to have hip surgery and to be more actiive.         CURRENT WEIGHT:   343 lbs 12.8 oz    Initial Weight (lbs): 407 lbs  Last Visits Weight: 159 kg (350 lb 9.6 oz)  Cumulative weight loss (lbs): 63.2  Weight Loss Percentage: 15.53%    Wt Readings from Last 5 Encounters:   01/23/25 (!) 155.9 kg (343 lb 12.8 oz)   10/10/24 (!) 159 kg (350 lb 9.6 oz)   10/03/24 (!) 161 kg (355 lb)   09/25/24 (!) 162.4 kg (358 lb)   07/22/24 (!) 166.5 kg (367 lb)             1/23/2025     2:04 PM   Changes and Difficulties   I have made the following changes to my diet since my last visit: less Mt Dew, more protein,less sugar   With regards to my diet, I am still struggling with: veggies, eating consistently,   I have made the following changes to my activity/exercise since my last visit: cont PP Pool at [facility], added regular PT in coordination with [place]   With regards to my activity/exercise, I am still struggling with: movement and exercise outside of pool and PT         MEDICATIONS:   Current Outpatient Medications   Medication Sig Dispense Refill    acetaminophen (TYLENOL) 500 MG tablet Take 1-2 tablets (500-1,000 mg) by mouth every 8 hours as needed for mild pain.      calcium polycarbophil (FIBER-LAX) 625 MG tablet TAKE 2 TABLETS (1250MG) BY MOUTH TWICE A  tablet 11    tirzepatide-Weight Management (ZEPBOUND) 12.5 MG/0.5ML prefilled pen Inject 0.5 mLs (12.5 mg) subcutaneously every 7 days. 6 mL 1    albuterol (VENTOLIN HFA) 108 (90 Base) MCG/ACT inhaler INHALE 2 PUFFS INTO LUNGS EVERY SIX HOURS AS NEEDED FOR SHORTNESS OF BREATH / DYSPNEA OR WHEEZING (Patient not taking: Reported on 1/23/2025) 18 g 1    celecoxib (CELEBREX) 100 MG capsule Take 1 capsule (100 mg) by mouth 2 times daily 60 capsule 1    DULoxetine (CYMBALTA) 60 MG capsule Take 120 mg by mouth daily       econazole nitrate 1 % external cream       ferrous sulfate  (FEROSUL) 325 (65 Fe) MG tablet Take 1 tablet (325 mg) by mouth daily (with breakfast) 30 tablet 2    hydrochlorothiazide (HYDRODIURIL) 25 MG tablet Take 1 tablet (25 mg) by mouth daily. 30 tablet 0    hydrocortisone 2.5 % cream APPLY THIN LAYER TO AFFECTED AREAS ON FACE TWICE DAILY FOR UP TO 2 WEEKS. DECREASE USE AS RASH IMPROVING. REPEAT AS NEEDED FOR FLARES      ketoconazole (NIZORAL) 2 % external cream APPLY THIN LAYER TOPICALLY TO FACE RASH TWICE DAILY UNTIL IMPROVED FOR SEBORRHEIC DERMATITIS      ketoconazole (NIZORAL) 2 % external shampoo       lisinopril (ZESTRIL) 10 MG tablet TAKE 1 TABLET BY MOUTH EVERY MORNING 90 tablet 3    mirabegron (MYRBETRIQ) 50 MG 24 hr tablet Take 1 tablet by mouth daily      nicotine (COMMIT) 2 MG lozenge Place 1 lozenge (2 mg) inside cheek every hour as needed for nicotine withdrawal symptoms. 60 lozenge 2    nicotine (NICORETTE) 2 MG gum Place 1 each (2 mg) inside cheek every hour as needed for nicotine withdrawal symptoms 50 each 1    nystatin-triamcinolone (MYCOLOG II) 066091-0.1 UNIT/GM-% external cream Apply topically 2 times daily 60 g 0    OLANZapine-samidorphan (LYBALVI) 10-10 MG TABS tablet Take 1 tablet by mouth At Bedtime      order for DME Equipment being ordered: CPAP supplies 1 each 0    oxyBUTYnin ER (DITROPAN XL) 15 MG 24 hr tablet Take 2 tablets (30 mg) by mouth daily 60 tablet 1    pantoprazole (PROTONIX) 40 MG EC tablet TAKE 1 TABLET BY MOUTH ONCE DAILY 30-60 MINUTE(S) BEFORE FIRST MEAL OF THE DAY 28 tablet 10    prazosin (MINIPRESS) 1 MG capsule Take 3 mg by mouth At Bedtime      varenicline (CHANTIX WENDI) 0.5 MG X 11 & 1 MG X 42 tablet Take 0.5 mg tab daily for 3 days, THEN 0.5 mg tab twice daily for 4 days, THEN 1 mg twice daily. 53 tablet 0    varenicline (CHANTIX) 1 MG tablet Take 1 tablet (1 mg) by mouth 2 times daily. 60 tablet 3           1/23/2025     2:04 PM   Weight Loss Medication History Reviewed With Patient   Are you having any side effects from  the weight loss medication that we have prescribed you? No               Objective    /62 (BP Location: Left arm, Patient Position: Chair, Cuff Size: Adult Large)   Pulse 64   Ht 1.829 m (6')   Wt (!) 155.9 kg (343 lb 12.8 oz)   SpO2 98%   BMI 46.63 kg/m        PHYSICAL EXAM:  GENERAL: alert and no distress  EYES: Eyes grossly normal to inspection.  No discharge or erythema, or obvious scleral/conjunctival abnormalities.  RESP: No audible wheeze, cough, or visible cyanosis.    SKIN: Visible skin clear. No significant rash, abnormal pigmentation or lesions.  NEURO: Cranial nerves grossly intact.  Mentation and speech appropriate for age.  PSYCH: Appropriate affect, tone, and pace of words        Sincerely,    Nya Lara PA-C      25 minutes spent by me on the date of the encounter doing chart review, history and exam, documentation and further activities per the note    The longitudinal plan of care for the diagnosis(es)/condition(s) as documented were addressed during this visit. Due to the added complexity in care, I will continue to support Willard in the subsequent management and with ongoing continuity of care.

## 2025-01-23 NOTE — LETTER
2025       RE: Kimo Greenwood  [address]  [place] [address]     Dear Colleague,    Thank you for referring your patient, Kimo Greenwood, to the [facility] WE[facility]NA[facility] [place] at [facility]. Please see a copy of my visit note below.      Return Medical Weight Management Note     Kimo Greenwood  MRN:  [id number]  :  1980  SALEEM:  2025    Dear Ciro Love MD,    I had the pleasure of seeing your patient Kimo Greenwood. He is a 44 year old male who I am continuing to see for treatment of obesity related to:        2023     1:43 PM   --   I have the following health issues associated with obesity None of the above       Assessment & Plan  Problem List Items Addressed This Visit       Class 3 severe obesity with serious comorbidity and body mass index (BMI) of 50.0 to 59.9 in adult (H)    Relevant Medications    tirzepatide-Weight Management (ZEPBOUND) 12.5 MG/0.5ML prefilled pen      Continue Zepbound 12.5mg once weekly. Refills sent   Nya Camarena in 5 months      INTERVAL HISTORY:  First seen for NBS - 2023  Continued with MWM, concerned for post op diet.   Weight loss required for hip replacement <330lbs  Transitioned to Wegovy 1.0mg   3/13/23 - continued Wegovy 1.0mg, goal of decreasing pop to 1-2 bottles daily   Reached out via [email or web address] 23 - increased Wegovy 1.7mg   23 - continued Wegovy 1.7mg   2023- continued wegovy 1.7, was finding it helpful with hunger and weight loss    Reached out via [email or web address] and increased Wegovy 2.4mg    Tried to transition to zepbound, but was not on formulary  Transitioned to Zepbound        Working on smoking cessation - going down on nicotine in vape. Goal to start chantex next week to be 6 weeks nicotine free prior to his surgery.     Hip surgery scheduled 3/17/2025!    Anti-obesity medication history    Current:   Zepbound 12.5mg - continues to be  helpful. No side effects.       Recent diet changes: switched from Mountain Dew to pepsi - only having 1-2 a day. Hunger well controlled. Working on consistently eating meals. Snacking on string cheese, yogurt, cottage cheese. Working on increase protein. Decrease in ice cream.     Recent exercise/activity changes: active is limited with hip. Going to pool therapy 1xweek and PT 1xmonth. Has found a really good fit at [facility]. Excited to have hip surgery and to be more actiive.         CURRENT WEIGHT:   343 lbs 12.8 oz    Initial Weight (lbs): 407 lbs  Last Visits Weight: 159 kg (350 lb 9.6 oz)  Cumulative weight loss (lbs): 63.2  Weight Loss Percentage: 15.53%    Wt Readings from Last 5 Encounters:   01/23/25 (!) 155.9 kg (343 lb 12.8 oz)   10/10/24 (!) 159 kg (350 lb 9.6 oz)   10/03/24 (!) 161 kg (355 lb)   09/25/24 (!) 162.4 kg (358 lb)   07/22/24 (!) 166.5 kg (367 lb)             1/23/2025     2:04 PM   Changes and Difficulties   I have made the following changes to my diet since my last visit: less Mt Dew, more protein,less sugar   With regards to my diet, I am still struggling with: veggies, eating consistently,   I have made the following changes to my activity/exercise since my last visit: cont PP Pool at [facility], added regular PT in coordination with [place]   With regards to my activity/exercise, I am still struggling with: movement and exercise outside of pool and PT         MEDICATIONS:   Current Outpatient Medications   Medication Sig Dispense Refill     acetaminophen (TYLENOL) 500 MG tablet Take 1-2 tablets (500-1,000 mg) by mouth every 8 hours as needed for mild pain.       calcium polycarbophil (FIBER-LAX) 625 MG tablet TAKE 2 TABLETS (1250MG) BY MOUTH TWICE A  tablet 11     tirzepatide-Weight Management (ZEPBOUND) 12.5 MG/0.5ML prefilled pen Inject 0.5 mLs (12.5 mg) subcutaneously every 7 days. 6 mL 1     albuterol (VENTOLIN HFA) 108 (90 Base) MCG/ACT inhaler INHALE 2 PUFFS INTO LUNGS EVERY SIX HOURS AS  NEEDED FOR SHORTNESS OF BREATH / DYSPNEA OR WHEEZING (Patient not taking: Reported on 1/23/2025) 18 g 1     celecoxib (CELEBREX) 100 MG capsule Take 1 capsule (100 mg) by mouth 2 times daily 60 capsule 1     DULoxetine (CYMBALTA) 60 MG capsule Take 120 mg by mouth daily        econazole nitrate 1 % external cream        ferrous sulfate (FEROSUL) 325 (65 Fe) MG tablet Take 1 tablet (325 mg) by mouth daily (with breakfast) 30 tablet 2     hydrochlorothiazide (HYDRODIURIL) 25 MG tablet Take 1 tablet (25 mg) by mouth daily. 30 tablet 0     hydrocortisone 2.5 % cream APPLY THIN LAYER TO AFFECTED AREAS ON FACE TWICE DAILY FOR UP TO 2 WEEKS. DECREASE USE AS RASH IMPROVING. REPEAT AS NEEDED FOR FLARES       ketoconazole (NIZORAL) 2 % external cream APPLY THIN LAYER TOPICALLY TO FACE RASH TWICE DAILY UNTIL IMPROVED FOR SEBORRHEIC DERMATITIS       ketoconazole (NIZORAL) 2 % external shampoo        lisinopril (ZESTRIL) 10 MG tablet TAKE 1 TABLET BY MOUTH EVERY MORNING 90 tablet 3     mirabegron (MYRBETRIQ) 50 MG 24 hr tablet Take 1 tablet by mouth daily       nicotine (COMMIT) 2 MG lozenge Place 1 lozenge (2 mg) inside cheek every hour as needed for nicotine withdrawal symptoms. 60 lozenge 2     nicotine (NICORETTE) 2 MG gum Place 1 each (2 mg) inside cheek every hour as needed for nicotine withdrawal symptoms 50 each 1     nystatin-triamcinolone (MYCOLOG II) 154271-9.1 UNIT/GM-% external cream Apply topically 2 times daily 60 g 0     OLANZapine-samidorphan (LYBALVI) 10-10 MG TABS tablet Take 1 tablet by mouth At Bedtime       order for DME Equipment being ordered: CPAP supplies 1 each 0     oxyBUTYnin ER (DITROPAN XL) 15 MG 24 hr tablet Take 2 tablets (30 mg) by mouth daily 60 tablet 1     pantoprazole (PROTONIX) 40 MG EC tablet TAKE 1 TABLET BY MOUTH ONCE DAILY 30-60 MINUTE(S) BEFORE FIRST MEAL OF THE DAY 28 tablet 10     prazosin (MINIPRESS) 1 MG capsule Take 3 mg by mouth At Bedtime       varenicline (CHANTIX WENDI) 0.5 MG X  11 & 1 MG X 42 tablet Take 0.5 mg tab daily for 3 days, THEN 0.5 mg tab twice daily for 4 days, THEN 1 mg twice daily. 53 tablet 0     varenicline (CHANTIX) 1 MG tablet Take 1 tablet (1 mg) by mouth 2 times daily. 60 tablet 3           1/23/2025     2:04 PM   Weight Loss Medication History Reviewed With Patient   Are you having any side effects from the weight loss medication that we have prescribed you? No               Objective   /62 (BP Location: Left arm, Patient Position: Chair, Cuff Size: Adult Large)   Pulse 64   Ht 1.829 m (6')   Wt (!) 155.9 kg (343 lb 12.8 oz)   SpO2 98%   BMI 46.63 kg/m        PHYSICAL EXAM:  GENERAL: alert and no distress  EYES: Eyes grossly normal to inspection.  No discharge or erythema, or obvious scleral/conjunctival abnormalities.  RESP: No audible wheeze, cough, or visible cyanosis.    SKIN: Visible skin clear. No significant rash, abnormal pigmentation or lesions.  NEURO: Cranial nerves grossly intact.  Mentation and speech appropriate for age.  PSYCH: Appropriate affect, tone, and pace of words        Sincerely,    Nya Lara PA-C      25 minutes spent by me on the date of the encounter doing chart review, history and exam, documentation and further activities per the note    The longitudinal plan of care for the diagnosis(es)/condition(s) as documented were addressed during this visit. Due to the added complexity in care, I will continue to support [place] in the subsequent management and with ongoing continuity of care.      Again, thank you for allowing me to participate in the care of your patient.      Sincerely,    Nya Lara PA-C

## 2025-01-24 NOTE — PATIENT INSTRUCTIONS
Visit Plan:        Continue Zepbound 12.5mg once weekly. Refills sent   Nya Camarena in 5 months    Keep up the great work!

## 2025-01-27 ENCOUNTER — TRANSFERRED RECORDS (OUTPATIENT)
Dept: HEALTH INFORMATION MANAGEMENT | Facility: CLINIC | Age: 45
End: 2025-01-27
Payer: COMMERCIAL

## 2025-02-03 ENCOUNTER — TELEPHONE (OUTPATIENT)
Dept: ENDOCRINOLOGY | Facility: CLINIC | Age: 45
End: 2025-02-03
Payer: COMMERCIAL

## 2025-02-03 NOTE — LETTER
2025    To: [employer]    RE: Kimo Greenwood  [address]  [facility] [address]  : 1980  MRN: [id number]    To Whom It May Concern,    I am writing on behalf of my patient, Kimo Greenwood, to document the medical necessity of Zepbound for the treatment of moderate to severe Obstructive Sleep Apnea (LOREN) in setting of obesity. This letter provides information about the patient s medical history and diagnosis and a statement summarizing my treatment rationale.    Summary of Patient History and Diagnosis    Kimo Greenwood is a 44 year old male with a diagnosis of Obesity (BMI at least 30 kg/m2) and severe Obstructive Sleep Apnea . Obstructive Sleep Apnea diagnosis was confirmed by sleep study where Apnea-Hypopnea Index (AHI): 23, depicting moderate Obstructive Sleep Apnea (AHI 15 to less than 30). . Despite prior attempts at management, including lifestyle modifications and use of Positive Airway Pressure (PAP) breathing therapy, the patients LOREN remains inadequately controlled, significantly impacting their quality of life and overall health.    Estimated body mass index is 46.63 kg/m  as calculated from the following:    Height as of 25: 1.829 m (6').    Weight as of 25: 155.9 kg (343 lb 12.8 oz).    Treatment Rationale    The recent FDA approval of Zepbound for Obstructive Sleep Apnea provides a new, evidence-based therapeutic option for addressing this condition. Clinical trials, including the SURMOUNT-LOREN study, have demonstrated Zepbound s efficacy in reducing airway obstruction by promoting significant weight loss and improving upper airway function--key factors in the pathophysiology of LOREN.    The SURMOUNT-LOREN study specifically evaluated the impact of Zepbound on individuals with LOREN and excess weight. The study found that patients receiving Zepbound experienced a significant reduction in the Apnea-Hypopnea Index (AHI), indicating an improvement in LOREN  severity. Additionally, participants reported enhanced sleep quality, reduced daytime sleepiness, and improvements in associated weight-related comorbidities such as hypertension and insulin resistance. These findings highlight Zepbound as a transformative option in LOREN management, especially for patients where weight loss plays a critical role.    Zepbound s mechanism as a dual GIP/GLP-1 receptor agonist addresses the root causes of LOREN in individuals with excess weight, offering a comprehensive approach to management. Its ability to improve both respiratory and metabolic outcomes makes it a pivotal therapy for my patient.    Denying coverage for Zepbound not only limits the patient s ability to access a critical treatment but also contradicts evidence-based medical practices. Providing coverage for Zepbound will empower my patient to achieve better health outcomes and reduce the long-term healthcare burden associated with untreated or poorly managed LOREN.    Duration    12 months    Summary    In summary, Zepbound is medically necessary for this patient s moderate to severe Obstructive Sleep Apnea with obesity. Please call my office at [phone] if I can provide you with any additional information to approve my request. I look forward to receiving your timely response and approval of this request.     Sincerely,    Nya Lara PA-C

## 2025-02-03 NOTE — TELEPHONE ENCOUNTER
PA Initiation    Medication: ZEPBOUND 12.5 MG/0.5ML SC SOAJ  Insurance Company: [employer] - Phone [phone] Fax [phone]  Pharmacy Filling the Rx: [facility] [id number] - [place], [place] - [address] [place] [address]  Filling Pharmacy Phone: [phone]  Filling Pharmacy Fax: [phone]  Start Date: 2/3/2025    Key: [id number]     Insurance may require pt to move to the 15mg dose based on PA question we will see

## 2025-02-04 DIAGNOSIS — I10 ESSENTIAL HYPERTENSION: ICD-10-CM

## 2025-02-04 RX ORDER — HYDROCHLOROTHIAZIDE 25 MG/1
25 TABLET ORAL DAILY
Qty: 90 TABLET | Refills: 3 | Status: SHIPPED | OUTPATIENT
Start: 2025-02-04

## 2025-02-05 ENCOUNTER — TELEPHONE (OUTPATIENT)
Dept: FAMILY MEDICINE | Facility: CLINIC | Age: 45
End: 2025-02-05
Payer: COMMERCIAL

## 2025-02-05 NOTE — TELEPHONE ENCOUNTER
Pt's mom is wanted to use metamucil for prn diarrhea.    CC tried to review with mom that this does not work for prn diarrhea this is more used for chronic diarrhea.   They do have imodium which CC as recommended more for prn diarrhea.     Pt has up coming hip surgery asking about vaccines - advised review these with surgeon for timing     Per PCP Would use metamucil daily as diarrhea preventative not for prn diarrhea would use imodium for this.      Message handled by Nurse Triage with [facility] - provider name: Ciro Love MD  .    My chart sent as per request to family with this information    Eliana Concepcion RN

## 2025-02-06 NOTE — TELEPHONE ENCOUNTER
PRIOR AUTHORIZATION DENIED    Medication: ZEPBOUND 12.5 MG/0.5ML SC SOAJ  Insurance Company: [employer] - Phone [phone] Fax [phone]  Denial Date: 2/4/2025  Denial Reason(s):       Appeal Information:   Patient Notified: no

## 2025-02-06 NOTE — TELEPHONE ENCOUNTER
Medication Appeal Request    Please initiate an appeal for the requested medication: ZEPBOUND 12.5 MG/0.5ML SC SOAJ    Has a letter of medical necessity been completed in EPIC?   yes    Any additional lab values/information to include?     Would you like to include any research articles?               If yes please include the hyperlink(s) below or fax to    [phone] for Specialty/Retail               [phone] for Infusion/Clinic Administered.                Include the patients name and MRN on the fax cover sheet.

## 2025-02-11 NOTE — TELEPHONE ENCOUNTER
Medication Appeal Initiation    Medication: ZEPBOUND 12.5 MG/0.5ML SC SOAJ  Appeal Start Date:  2/11/2025  Insurance Company: [employer] ([employer])   Insurance Phone: [phone]  Insurance Fax: [phone]  Comments:

## 2025-02-19 NOTE — TELEPHONE ENCOUNTER
MEDICATION APPEAL APPROVED    Medication: ZEPBOUND 12.5 MG/0.5ML SC SOAJ  Authorization Effective Date: 1/1/2025  Authorization Expiration Date: 2/18/2026  Approved Dose/Quantity: 2ml per 28 days  Reference #: Key: [id number]   Appeal Insurance Company: [employer]  Expected CoPay: $       CoPay Card Available: No  Financial Assistance Needed: N/A  Filling Pharmacy: [id number] [place], [place] - [address] [place] [address]  Faxed approval to pharmacy to process

## 2025-03-26 ENCOUNTER — OFFICE VISIT (OUTPATIENT)
Dept: FAMILY MEDICINE | Facility: CLINIC | Age: 45
End: 2025-03-26
Payer: COMMERCIAL

## 2025-03-26 VITALS
RESPIRATION RATE: 24 BRPM | HEIGHT: 72 IN | WEIGHT: 312.8 LBS | TEMPERATURE: 97.8 F | BODY MASS INDEX: 42.37 KG/M2 | SYSTOLIC BLOOD PRESSURE: 91 MMHG | OXYGEN SATURATION: 97 % | HEART RATE: 88 BPM | DIASTOLIC BLOOD PRESSURE: 63 MMHG

## 2025-03-26 DIAGNOSIS — M16.11 ARTHRITIS OF RIGHT HIP: ICD-10-CM

## 2025-03-26 DIAGNOSIS — F33.3 SEVERE RECURRENT MAJOR DEPRESSIVE DISORDER WITH PSYCHOTIC FEATURES (H): ICD-10-CM

## 2025-03-26 DIAGNOSIS — I10 ESSENTIAL HYPERTENSION: ICD-10-CM

## 2025-03-26 DIAGNOSIS — Z01.818 PRE-OP EXAM: Primary | ICD-10-CM

## 2025-03-26 LAB
ANION GAP SERPL CALCULATED.3IONS-SCNC: 11 MMOL/L (ref 7–15)
BUN SERPL-MCNC: 18.1 MG/DL (ref 6–20)
CALCIUM SERPL-MCNC: 9.7 MG/DL (ref 8.8–10.4)
CHLORIDE SERPL-SCNC: 98 MMOL/L (ref 98–107)
CREAT SERPL-MCNC: 1.22 MG/DL (ref 0.67–1.17)
EGFRCR SERPLBLD CKD-EPI 2021: 75 ML/MIN/1.73M2
ERYTHROCYTE [DISTWIDTH] IN BLOOD BY AUTOMATED COUNT: 12.7 % (ref 10–15)
GLUCOSE SERPL-MCNC: 112 MG/DL (ref 70–99)
HCO3 SERPL-SCNC: 27 MMOL/L (ref 22–29)
HCT VFR BLD AUTO: 38.3 % (ref 40–53)
HGB BLD-MCNC: 12.8 G/DL (ref 13.3–17.7)
MCH RBC QN AUTO: 29.4 PG (ref 26.5–33)
MCHC RBC AUTO-ENTMCNC: 33.4 G/DL (ref 31.5–36.5)
MCV RBC AUTO: 88 FL (ref 78–100)
PLATELET # BLD AUTO: 329 10E3/UL (ref 150–450)
POTASSIUM SERPL-SCNC: 4 MMOL/L (ref 3.4–5.3)
RBC # BLD AUTO: 4.36 10E6/UL (ref 4.4–5.9)
SODIUM SERPL-SCNC: 136 MMOL/L (ref 135–145)
WBC # BLD AUTO: 19.8 10E3/UL (ref 4–11)

## 2025-03-26 PROCEDURE — 3078F DIAST BP <80 MM HG: CPT | Performed by: FAMILY MEDICINE

## 2025-03-26 PROCEDURE — 36415 COLL VENOUS BLD VENIPUNCTURE: CPT | Performed by: FAMILY MEDICINE

## 2025-03-26 PROCEDURE — 3074F SYST BP LT 130 MM HG: CPT | Performed by: FAMILY MEDICINE

## 2025-03-26 PROCEDURE — 80048 BASIC METABOLIC PNL TOTAL CA: CPT | Performed by: FAMILY MEDICINE

## 2025-03-26 PROCEDURE — 1125F AMNT PAIN NOTED PAIN PRSNT: CPT | Performed by: FAMILY MEDICINE

## 2025-03-26 PROCEDURE — 85027 COMPLETE CBC AUTOMATED: CPT | Performed by: FAMILY MEDICINE

## 2025-03-26 PROCEDURE — 99214 OFFICE O/P EST MOD 30 MIN: CPT | Performed by: FAMILY MEDICINE

## 2025-03-26 ASSESSMENT — PATIENT HEALTH QUESTIONNAIRE - PHQ9
10. IF YOU CHECKED OFF ANY PROBLEMS, HOW DIFFICULT HAVE THESE PROBLEMS MADE IT FOR YOU TO DO YOUR WORK, TAKE CARE OF THINGS AT HOME, OR GET ALONG WITH OTHER PEOPLE: SOMEWHAT DIFFICULT
SUM OF ALL RESPONSES TO PHQ QUESTIONS 1-9: 8
SUM OF ALL RESPONSES TO PHQ QUESTIONS 1-9: 8

## 2025-03-26 ASSESSMENT — ANXIETY QUESTIONNAIRES
GAD7 TOTAL SCORE: 0
7. FEELING AFRAID AS IF SOMETHING AWFUL MIGHT HAPPEN: NOT AT ALL
8. IF YOU CHECKED OFF ANY PROBLEMS, HOW DIFFICULT HAVE THESE MADE IT FOR YOU TO DO YOUR WORK, TAKE CARE OF THINGS AT HOME, OR GET ALONG WITH OTHER PEOPLE?: NOT DIFFICULT AT ALL
3. WORRYING TOO MUCH ABOUT DIFFERENT THINGS: NOT AT ALL
2. NOT BEING ABLE TO STOP OR CONTROL WORRYING: NOT AT ALL
GAD7 TOTAL SCORE: 0
5. BEING SO RESTLESS THAT IT IS HARD TO SIT STILL: NOT AT ALL
4. TROUBLE RELAXING: NOT AT ALL
6. BECOMING EASILY ANNOYED OR IRRITABLE: NOT AT ALL
7. FEELING AFRAID AS IF SOMETHING AWFUL MIGHT HAPPEN: NOT AT ALL
GAD7 TOTAL SCORE: 0
1. FEELING NERVOUS, ANXIOUS, OR ON EDGE: NOT AT ALL
IF YOU CHECKED OFF ANY PROBLEMS ON THIS QUESTIONNAIRE, HOW DIFFICULT HAVE THESE PROBLEMS MADE IT FOR YOU TO DO YOUR WORK, TAKE CARE OF THINGS AT HOME, OR GET ALONG WITH OTHER PEOPLE: NOT DIFFICULT AT ALL

## 2025-03-26 ASSESSMENT — PAIN SCALES - GENERAL: PAINLEVEL_OUTOF10: SEVERE PAIN (8)

## 2025-03-26 ASSESSMENT — ASTHMA QUESTIONNAIRES
ACT_TOTALSCORE: 25
QUESTION_4 LAST FOUR WEEKS HOW OFTEN HAVE YOU USED YOUR RESCUE INHALER OR NEBULIZER MEDICATION (SUCH AS ALBUTEROL): NOT AT ALL
QUESTION_2 LAST FOUR WEEKS HOW OFTEN HAVE YOU HAD SHORTNESS OF BREATH: NOT AT ALL
QUESTION_3 LAST FOUR WEEKS HOW OFTEN DID YOUR ASTHMA SYMPTOMS (WHEEZING, COUGHING, SHORTNESS OF BREATH, CHEST TIGHTNESS OR PAIN) WAKE YOU UP AT NIGHT OR EARLIER THAN USUAL IN THE MORNING: NOT AT ALL
QUESTION_5 LAST FOUR WEEKS HOW WOULD YOU RATE YOUR ASTHMA CONTROL: COMPLETELY CONTROLLED
QUESTION_1 LAST FOUR WEEKS HOW MUCH OF THE TIME DID YOUR ASTHMA KEEP YOU FROM GETTING AS MUCH DONE AT WORK, SCHOOL OR AT HOME: NONE OF THE TIME

## 2025-03-26 NOTE — PATIENT INSTRUCTIONS
No Ibuprofen within 24 hours.   Stop the lisinopril forever.  Do not take the hydrochlorothiazide on the morning of surgery.  Last zepbound shot would be 4/3/2025, then resume after.

## 2025-03-26 NOTE — PROGRESS NOTES
Preoperative Evaluation  [facility]  [address] [address]  Phone: [phone]  Primary Provider: Ciro Love MD  Pre-op Performing Provider: Ciro Love MD  Mar 26, 2025             3/25/2025   Surgical Information   What procedure is being done? Right hip replacement   Facility or Hospital where procedure/surgery will be performed: [facility]   Who is doing the procedure / surgery? [facility]- Dr. Mayer   Date of surgery / procedure: 4/14/25   Time of surgery / procedure: ? Not communicated yet   Where do you plan to recover after surgery? at home with family     Fax number for surgical facility: Note does not need to be faxed, will be available electronically in Epic.    Assessment & Plan     The proposed surgical procedure is considered INTERMEDIATE risk.    Pre-op exam  - CBC with platelets  - Basic metabolic panel  (Ca, Cl, CO2, Creat, Gluc, K, Na, BUN)  - CBC with platelets  - Basic metabolic panel  (Ca, Cl, CO2, Creat, Gluc, K, Na, BUN)    Arthritis of right hip    Severe recurrent major depressive disorder with psychotic features (H)    Essential hypertension  With the weight loss some, now he is overcontrolled, discontinue lisinopril.  Continue hydrochlorothiazide.    Risks and Recommendations  The patient has the following additional risks and recommendations for perioperative complications:   - Morbid obesity (BMI >40)  Obstructive Sleep Apnea:       Antiplatelet or Anticoagulation Medication Instructions   - We reviewed the medication list and the patient is not on an antiplatelet or anticoagulation medications.    Additional Medication Instructions  Take all scheduled medications on the day of surgery EXCEPT for modifications listed below:   - Diuretics (furosemide, hydrochlorothiazide, chlorothalidone): DO NOT TAKE on the day of surgery.   - GLP-1 Injectable (exenitide, liraglutide, semaglutide, dulaglutide, etc.): DO NOT TAKE 7 days before surgery      Recommendation  Approval given to proceed with proposed procedure     Subjective   Willard is a 44 year old, presenting for the following:  Pre-Op Exam (Right Total Hip Arthroplasty. Bala Randall MD. 4/14/2025. 7:20 AM. [facility]. Pt pca is asking is the pt should still be on the bp medication. )          3/26/2025     1:18 PM   Additional Questions   Roomed by Kristie Lepe MA Learner   Accompanied by Andrea CASTELLANOS: Preoperative evaluation prior to right hip total arthroplasty          3/25/2025   Pre-Op Questionnaire   Have you ever had a heart attack or stroke? No   Have you ever had surgery on your heart or blood vessels, such as a stent placement, a coronary artery bypass, or surgery on an artery in your head, neck, heart, or legs? No   Do you have chest pain with activity? No   Do you have a history of heart failure? No   Do you currently have a cold, bronchitis or symptoms of other infection? No   Do you have a cough, shortness of breath, or wheezing? (!) YES    Do you or anyone in your family have previous history of blood clots? No   Do you or does anyone in your family have a serious bleeding problem such as prolonged bleeding following surgeries or cuts? No   Have you ever had problems with anemia or been told to take iron pills? (!) YES    Have you had any abnormal blood loss such as black, tarry or bloody stools? No   Have you ever had a blood transfusion? No   Are you willing to have a blood transfusion if it is medically needed before, during, or after your surgery? Yes   Have you or any of your relatives ever had problems with anesthesia? No   Do you have sleep apnea, excessive snoring or daytime drowsiness? (!) YES   Do you have a CPAP machine? Yes   Do you have any artifical heart valves or other implanted medical devices like a pacemaker, defibrillator, or continuous glucose monitor? No   Do you have artificial joints? (!) YES   Are you allergic to  latex? No     Health Care Directive  Patient has a Health Care Directive on file      Preoperative Review of    reviewed - no record of controlled substances prescribed.      Status of Chronic Conditions:  HYPERTENSION - Patient has longstanding history of HTN , currently denies any symptoms referable to elevated blood pressure. Specifically denies chest pain, palpitations, dyspnea, orthopnea, PND or peripheral edema. Blood pressure readings have been in normal range. Current medication regimen is as listed below. Patient denies any side effects of medication.     Patient Active Problem List    Diagnosis Date Noted    Mood change 04/25/2022     Priority: Medium    Severe recurrent major depressive disorder with psychotic features (H) 10/06/2021     Priority: Medium    Alcohol use disorder, mild, abuse 10/06/2021     Priority: Medium    Cannabis use disorder, mild, abuse 10/06/2021     Priority: Medium    Venous stasis dermatitis of both lower extremities 08/27/2021     Priority: Medium    Duodenitis 09/30/2020     Priority: Medium     Added automatically from request for surgery [id number]      Somatic dysfunction of lumbar region 10/13/2019     Priority: Medium    Somatic dysfunction of sacral region 10/13/2019     Priority: Medium    Sacral pain 10/13/2019     Priority: Medium    Thoracic segment dysfunction 10/13/2019     Priority: Medium    Hip pain, left 10/13/2019     Priority: Medium    Mild intermittent asthma 05/08/2017     Priority: Medium    Patel's esophagus determined by biopsy 03/27/2017     Priority: Medium     Next upper GI endoscopy (EGD) 11/2019      Vitamin D deficiency 03/20/2017     Priority: Medium    LPRD (laryngopharyngeal reflux disease) 03/20/2017     Priority: Medium    S/P total hip arthroplasty 01/25/2017     Priority: Medium    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     Priority: Medium    History of colonic polyps 09/28/2016     Priority: Medium     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 None 11/2016. Next colonoscopy 11/2021 if not sooner.      Overactive bladder 11/06/2015     Priority: Medium    Bilateral low back pain with left-sided sciatica 10/08/2015     Priority: Medium    Chronic low back pain 10/06/2015     Priority: Medium    Suicidal ideation 08/23/2015     Priority: Medium    Leukocytosis 06/09/2014     Priority: Medium    Class 3 severe obesity with serious comorbidity and body mass index (BMI) of 50.0 to 59.9 in adult (H) 05/20/2014     Priority: Medium    Essential hypertension 02/03/2014     Priority: Medium    LOREN (obstructive sleep apnea) 04/23/2013     Priority: Medium     CPAP      Moderate major depression (H) 01/15/2013     Priority: Medium    Speech/language delay 11/11/2010     Priority: Medium    Tobacco use disorder 11/11/2010     Priority: Medium    Nocturnal enuresis      Priority: Medium      Past Medical History:   Diagnosis Date    Arthritis     DDD hips and knees    Asthma     Asthma     Patel's esophagus     Chronic pain     Chronic pain - left hip/leg pain     degenerative disc disease, back, hips, knees    Gastroesophageal reflux disease     History of anesthesia complications     agitation x1 after interstim 2013    Hypertension     Hypertension     Leukocytosis     LPRD (laryngopharyngeal reflux disease)     Marijuana abuse     Marijuana abuse     MDD (major depressive disorder)     MDD (major depressive disorder)     Mental retardation, mild (I.Q. 50-70)     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    With associated speech/language delay    Obesity 11/11/2010    LOREN (obstructive sleep apnea)     Uses a CPAP    LOREN (obstructive sleep apnea)     Seborrheic dermatitis     Seborrheic dermatitis     Sleep apnea     CPAP     Past Surgical History:   Procedure Laterality Date    ARTHROPLASTY HIP Left 1/25/2017    Procedure: ARTHROPLASTY HIP;  Surgeon: Bala Randall MD;  Location: [facility] OR    ARTHROPLASTY HIP Left     ARTHROPLASTY  REVISION HIP Left 6/24/2019    Procedure: Revision left total hip arthroplasty with a head and liner exchange to a Biomet dual mobility head and liner.;  Surgeon: Bala Randall MD;  Location: [facility] OR    BLADDER SURGERY      Ibarra, [facility],Interstem stimulator implant    CLOSED REDUCTION HIP Left 6/10/2019    Procedure: Closed reduction under general anesthesia left recurrent prosthetic hip dislocation;  Surgeon: Rafat Cazares MD;  Location: [facility] OR    COLONOSCOPY N/A 10/30/2015    Procedure: COMBINED COLONOSCOPY, SINGLE OR MULTIPLE BIOPSY/POLYPECTOMY BY BIOPSY;  Surgeon: Alexis Jackson MD;  Location: [facility] GI    COLONOSCOPY N/A 11/17/2016    Procedure: COMBINED COLONOSCOPY, SINGLE OR MULTIPLE BIOPSY/POLYPECTOMY BY BIOPSY;  Surgeon: Cat Huber MD;  Location: [facility]    COLONOSCOPY N/A 10/28/2020    Procedure: COLONOSCOPY;  Surgeon: You Kraus MD;  Location: [facility]    COLONOSCOPY      ESOPHAGOSCOPY, GASTROSCOPY, DUODENOSCOPY (EGD), COMBINED N/A 11/17/2016    Procedure: COMBINED ESOPHAGOSCOPY, GASTROSCOPY, DUODENOSCOPY (EGD), BIOPSY SINGLE OR MULTIPLE;  Surgeon: Cat Huber MD;  [facility]: [facility]    ESOPHAGOSCOPY, GASTROSCOPY, DUODENOSCOPY (EGD), COMBINED N/A 3/12/2020    Procedure: ESOPHAGOGASTRODUODENOSCOPY WITH BIOPSIES;  Surgeon: You Kraus MD;  Location: [facility]    ESOPHAGOSCOPY, GASTROSCOPY, DUODENOSCOPY (EGD), COMBINED N/A 10/28/2020    Procedure: ESOPHAGOGASTRODUODENOSCOPY, WITH BIOPSY;  Surgeon: You Kraus MD;  Location: [facility] OR    ESOPHAGOSCOPY, GASTROSCOPY, DUODENOSCOPY (EGD), COMBINED      ESOPHAGOSCOPY, GASTROSCOPY, DUODENOSCOPY (EGD), COMBINED N/A 11/29/2023    Procedure: Esophagoscopy, gastroscopy, duodenoscopy (EGD), combined with biopsies using forceps;  Surgeon: You Kraus MD;  Location:  GI    EXAM UNDER ANESTHESIA, FULGURATE CONDYLOMA ANUS, COMBINED N/A 12/22/2016    Procedure: COMBINED EXAM UNDER ANESTHESIA, FULGURATE  CONDYLOMA ANUS;  Surgeon: Nya Cabello MD;  [facility]    GENITOURINARY SURGERY      JOINT REPLACEMENT Left 2017    MARGO, Revision Left MARGO    OTHER SURGICAL HISTORY      interstim stimulator implant    NH CYSTOURETHROSCOPY INJ CHEMODENERVATION BLADDER N/A 1/16/2019    Procedure: CYSTOSCOPY BOTOX BLADDER INJECTIONS (100 UNITS IN 10CC);  Surgeon: Didier Ibarra MD;  Location: [facility];  Service: Urology    NH IMPLANT PERIPH/GASTRIC NEUROSTIM/ N/A 1/8/2020    Procedure: NEW INTERSTIM LEAD, REPLACE OLD; NEW INTERSTIM BATTERY, REPLACE OLD;  Surgeon: Didier Ibarra MD;  Location: [facility];  Service: Urology     Current Outpatient Medications   Medication Sig Dispense Refill    acetaminophen (TYLENOL) 500 MG tablet Take 1-2 tablets (500-1,000 mg) by mouth every 8 hours as needed for mild pain.      calcium polycarbophil (FIBER-LAX) 625 MG tablet Take 1 tablet (625 mg) by mouth daily. 90 tablet 3    DULoxetine (CYMBALTA) 60 MG capsule Take 120 mg by mouth daily       econazole nitrate 1 % external cream       ferrous sulfate (FEROSUL) 325 (65 Fe) MG tablet Take 1 tablet (325 mg) by mouth daily (with breakfast) 30 tablet 2    hydrochlorothiazide (HYDRODIURIL) 25 MG tablet TAKE 1 TABLET BY MOUTH DAILY 90 tablet 3    hydrocortisone 2.5 % cream APPLY THIN LAYER TO AFFECTED AREAS ON FACE TWICE DAILY FOR UP TO 2 WEEKS. DECREASE USE AS RASH IMPROVING. REPEAT AS NEEDED FOR FLARES      ibuprofen (ADVIL/MOTRIN) 600 MG tablet Take 600 mg by mouth every 6 hours as needed for moderate pain.      ketoconazole (NIZORAL) 2 % external cream APPLY THIN LAYER TOPICALLY TO FACE RASH TWICE DAILY UNTIL IMPROVED FOR SEBORRHEIC DERMATITIS      lisinopril (ZESTRIL) 10 MG tablet TAKE 1 TABLET BY MOUTH EVERY MORNING 90 tablet 3    mirabegron (MYRBETRIQ) 50 MG 24 hr tablet Take 1 tablet by mouth daily      nystatin-triamcinolone (MYCOLOG II) 070676-0.1 UNIT/GM-% external cream Apply topically 2 times daily 60  g 0    OLANZapine-samidorphan (LYBALVI) 10-10 MG TABS tablet Take 1 tablet by mouth At Bedtime      oxyBUTYnin ER (DITROPAN XL) 15 MG 24 hr tablet Take 2 tablets (30 mg) by mouth daily 60 tablet 1    pantoprazole (PROTONIX) 40 MG EC tablet TAKE 1 TABLET BY MOUTH ONCE DAILY 30-60 MINUTE(S) BEFORE FIRST MEAL OF THE DAY 28 tablet 10    prazosin (MINIPRESS) 1 MG capsule Take 3 mg by mouth At Bedtime      tirzepatide-Weight Management (ZEPBOUND) 12.5 MG/0.5ML prefilled pen Inject 0.5 mLs (12.5 mg) subcutaneously every 7 days. 6 mL 1       Allergies   Allergen Reactions    Other [No Clinical Screening - See Comments] Other (See Comments)     Awoke from anesthesia with anger and ripping off dressing, after interstim placement, outside hospital         Social History     Tobacco Use    Smoking status: Former     Current packs/day: 0.00     Average packs/day: 1 pack/day for 1 year (1.0 ttl pk-yrs)     Types: Cigarettes, Other     Quit date: 2025     Years since quittin.0     Passive exposure: Current    Smokeless tobacco: Former     Types: Chew     Quit date: 2025    Tobacco comments:     Switched to an ecig with 0% nicotine. Current Marijuana Used.    Substance Use Topics    Alcohol use: Yes     Comment: socially       History   Drug Use    Types: Marijuana     Comment: winsome               Objective    BP 91/63 (BP Location: Right arm, Patient Position: Sitting, Cuff Size: Adult Large)   Pulse 88   Temp 97.8  F (36.6  C) (Oral)   Resp 24   Ht 1.829 m (6')   Wt (!) 141.9 kg (312 lb 12.8 oz)   SpO2 97%   BMI 42.42 kg/m     Estimated body mass index is 42.42 kg/m  as calculated from the following:    Height as of this encounter: 1.829 m (6').    Weight as of this encounter: 141.9 kg (312 lb 12.8 oz).  Physical Exam  GENERAL: alert and no distress  EYES: Eyes grossly normal to inspection, PERRL and conjunctivae and sclerae normal  HENT: ear canals and TM's normal, nose and mouth without ulcers or  lesions  NECK: no adenopathy, no asymmetry, masses, or scars  RESP: lungs clear to auscultation - no rales, rhonchi or wheezes  CV: regular rate and rhythm, normal S1 S2, no S3 or S4, no murmur, click or rub, no peripheral edema  ABDOMEN: soft, nontender, no hepatosplenomegaly, no masses and bowel sounds normal  MS: no edema  SKIN: no suspicious lesions or rashes  NEURO: Normal strength and tone, mentation intact and speech normal  PSYCH: mentation appears normal, affect normal/bright    Recent Labs   Lab Test 08/28/24  1310 06/11/24  1507   HGB 13.3 16.3    154        Diagnostics  Recent Results (from the past 24 hours)   CBC with platelets    Collection Time: 03/26/25  1:59 PM   Result Value Ref Range    WBC Count 19.8 (H) 4.0 - 11.0 10e3/uL    RBC Count 4.36 (L) 4.40 - 5.90 10e6/uL    Hemoglobin 12.8 (L) 13.3 - 17.7 g/dL    Hematocrit 38.3 (L) 40.0 - 53.0 %    MCV 88 78 - 100 fL    MCH 29.4 26.5 - 33.0 pg    MCHC 33.4 31.5 - 36.5 g/dL    RDW 12.7 10.0 - 15.0 %    Platelet Count 329 150 - 450 10e3/uL   Basic metabolic panel  (Ca, Cl, CO2, Creat, Gluc, K, Na, BUN)    Collection Time: 03/26/25  1:59 PM   Result Value Ref Range    Sodium 136 135 - 145 mmol/L    Potassium 4.0 3.4 - 5.3 mmol/L    Chloride 98 98 - 107 mmol/L    Carbon Dioxide (CO2) 27 22 - 29 mmol/L    Anion Gap 11 7 - 15 mmol/L    Urea Nitrogen 18.1 6.0 - 20.0 mg/dL    Creatinine 1.22 (H) 0.67 - 1.17 mg/dL    GFR Estimate 75 >60 mL/min/1.73m2    Calcium 9.7 8.8 - 10.4 mg/dL    Glucose 112 (H) 70 - 99 mg/dL      No EKG required, no history of coronary heart disease, significant arrhythmia, peripheral arterial disease or other structural heart disease.    Revised Cardiac Risk Index (RCRI)  The patient has the following serious cardiovascular risks for perioperative complications:   - No serious cardiac risks = 0 points     RCRI Interpretation: 0 points: Class I (very low risk - 0.4% complication rate)         Signed Electronically by: Ciro BUSTILLO  MD Kate  A copy of this evaluation report is provided to the requesting physician.         Answers submitted by the patient for this visit:  Patient Health Questionnaire (Submitted on 3/26/2025)  If you checked off any problems, how difficult have these problems made it for you to do your work, take care of things at home, or get along with other people?: Somewhat difficult  PHQ9 TOTAL SCORE: 8  Patient Health Questionnaire (G7) (Submitted on 3/26/2025)  ANDRES 7 TOTAL SCORE: 0

## 2025-03-28 NOTE — PROVIDER NOTIFICATION
03/28/25 1459   Discharge Planning   Patient/Family Anticipates Transition to home with family  ([facility] will [facility])   Concerns to be Addressed denies needs/concerns at this time   Living Arrangements   People in Home alone   Type of Residence Private Residence   Is your private residence a single family home or apartment? Apartment   Number of Stairs, Within Home, Primary other (see comments)  (5 steps to get into the front door)   Once home, are you able to live on one level? Yes   Which level? Main Level   Which rooms are not on the main level? Other (Comments)  (n/a)   Bathroom Shower/Tub Walk-in shower   Equipment Currently Used at Home walker, rolling;cane, straight   Support System   Support Systems /   Do you have someone available to stay with you one or two nights once you are home? Yes  (Patient will be discharging to his momBelen [facility] (co-guardian))   Medical Clearance   Date of Physical 03/26/25   It is recommended that you call and check with any specialty providers before surgery to see if you need surgical clearance.  Do you see any specialty providers outside of your primary care provider? No   Blood   Known Bleeding Disorder or Coagulopathy No   Does the patient have any Denominational/cultural preferences related to blood products? No   What are your blood product preferences? patient accepts blood in an emergency   Falls Risk   Has the patient been identified as a high falls risk before surgery? (fallen in the last 6 months, history of falls, unsteady gait, or balance issues) No   Did an order get placed to attend outpatient pre-surgery balance evaluation? No

## 2025-03-30 ENCOUNTER — MYC MEDICAL ADVICE (OUTPATIENT)
Dept: ONCOLOGY | Facility: CLINIC | Age: 45
End: 2025-03-30
Payer: COMMERCIAL

## 2025-03-31 NOTE — TELEPHONE ENCOUNTER
Writer received a call from Crys Mckeon regarding pre-op labs and if its ok to go ahead with surgery. Reubenr told Crys per Dr Chiang they have not found an explanation for increased WBC and his RBC's are ok.  Willard is OK to continue with surgery.  Crys understood and thanked writer for information.    Munira Carlos, RN, BSN.  RN Care Coordinator    [facility]   [phone]

## 2025-04-14 ENCOUNTER — ANESTHESIA (OUTPATIENT)
Dept: SURGERY | Facility: CLINIC | Age: 45
End: 2025-04-14
Payer: COMMERCIAL

## 2025-04-14 ENCOUNTER — ANESTHESIA EVENT (OUTPATIENT)
Dept: SURGERY | Facility: CLINIC | Age: 45
End: 2025-04-14
Payer: COMMERCIAL

## 2025-04-14 ENCOUNTER — HOSPITAL ENCOUNTER (OUTPATIENT)
Facility: CLINIC | Age: 45
Discharge: HOME | End: 2025-04-15
Attending: ORTHOPAEDIC SURGERY | Admitting: ORTHOPAEDIC SURGERY
Payer: COMMERCIAL

## 2025-04-14 ENCOUNTER — APPOINTMENT (OUTPATIENT)
Dept: GENERAL RADIOLOGY | Facility: CLINIC | Age: 45
End: 2025-04-14
Attending: ORTHOPAEDIC SURGERY
Payer: COMMERCIAL

## 2025-04-14 DIAGNOSIS — Z96.641 S/P TOTAL RIGHT HIP ARTHROPLASTY: Primary | ICD-10-CM

## 2025-04-14 PROCEDURE — C1776 JOINT DEVICE (IMPLANTABLE): HCPCS | Performed by: ORTHOPAEDIC SURGERY

## 2025-04-14 PROCEDURE — 999N000141 HC STATISTIC PRE-PROCEDURE NURSING ASSESSMENT: Performed by: ORTHOPAEDIC SURGERY

## 2025-04-14 PROCEDURE — 250N000013 HC RX MED GY IP 250 OP 250 PS 637: Performed by: ORTHOPAEDIC SURGERY

## 2025-04-14 PROCEDURE — 258N000003 HC RX IP 258 OP 636

## 2025-04-14 PROCEDURE — 250N000011 HC RX IP 250 OP 636: Performed by: PHYSICIAN ASSISTANT

## 2025-04-14 PROCEDURE — 250N000025 HC SEVOFLURANE, PER MIN: Performed by: ORTHOPAEDIC SURGERY

## 2025-04-14 PROCEDURE — 710N000009 HC RECOVERY PHASE 1, LEVEL 1, PER MIN: Performed by: ORTHOPAEDIC SURGERY

## 2025-04-14 PROCEDURE — 360N000077 HC SURGERY LEVEL 4, PER MIN: Performed by: ORTHOPAEDIC SURGERY

## 2025-04-14 PROCEDURE — 272N000001 HC OR GENERAL SUPPLY STERILE: Performed by: ORTHOPAEDIC SURGERY

## 2025-04-14 PROCEDURE — 258N000001 HC RX 258: Performed by: ORTHOPAEDIC SURGERY

## 2025-04-14 PROCEDURE — 97161 PT EVAL LOW COMPLEX 20 MIN: CPT | Mod: GP | Performed by: PHYSICAL THERAPIST

## 2025-04-14 PROCEDURE — C1713 ANCHOR/SCREW BN/BN,TIS/BN: HCPCS | Performed by: ORTHOPAEDIC SURGERY

## 2025-04-14 PROCEDURE — 999N000063 XR HIP PORT RIGHT 1 VIEW

## 2025-04-14 PROCEDURE — 97530 THERAPEUTIC ACTIVITIES: CPT | Mod: GP | Performed by: PHYSICAL THERAPIST

## 2025-04-14 PROCEDURE — 999N000065 XR PELVIS AND HIP PORTABLE RIGHT 1 VIEW

## 2025-04-14 PROCEDURE — 250N000011 HC RX IP 250 OP 636: Performed by: ORTHOPAEDIC SURGERY

## 2025-04-14 PROCEDURE — 250N000013 HC RX MED GY IP 250 OP 250 PS 637: Performed by: ANESTHESIOLOGY

## 2025-04-14 PROCEDURE — 370N000017 HC ANESTHESIA TECHNICAL FEE, PER MIN: Performed by: ORTHOPAEDIC SURGERY

## 2025-04-14 PROCEDURE — 97116 GAIT TRAINING THERAPY: CPT | Mod: GP | Performed by: PHYSICAL THERAPIST

## 2025-04-14 PROCEDURE — 99214 OFFICE O/P EST MOD 30 MIN: CPT | Performed by: INTERNAL MEDICINE

## 2025-04-14 PROCEDURE — 250N000011 HC RX IP 250 OP 636: Mod: JZ | Performed by: ANESTHESIOLOGY

## 2025-04-14 PROCEDURE — 258N000003 HC RX IP 258 OP 636: Performed by: ORTHOPAEDIC SURGERY

## 2025-04-14 PROCEDURE — 250N000013 HC RX MED GY IP 250 OP 250 PS 637: Performed by: INTERNAL MEDICINE

## 2025-04-14 PROCEDURE — 250N000011 HC RX IP 250 OP 636

## 2025-04-14 PROCEDURE — 250N000013 HC RX MED GY IP 250 OP 250 PS 637: Performed by: PHYSICIAN ASSISTANT

## 2025-04-14 PROCEDURE — 250N000009 HC RX 250: Performed by: ORTHOPAEDIC SURGERY

## 2025-04-14 PROCEDURE — 250N000009 HC RX 250

## 2025-04-14 PROCEDURE — 258N000003 HC RX IP 258 OP 636: Performed by: ANESTHESIOLOGY

## 2025-04-14 DEVICE — IMPLANTABLE DEVICE
Type: IMPLANTABLE DEVICE | Site: HIP | Status: FUNCTIONAL
Brand: TAPERLOC COMPLETE PRIMARY FEMORAL

## 2025-04-14 DEVICE — IMPLANTABLE DEVICE: Type: IMPLANTABLE DEVICE | Site: HIP | Status: FUNCTIONAL

## 2025-04-14 DEVICE — IMPLANTABLE DEVICE
Type: IMPLANTABLE DEVICE | Site: HIP | Status: FUNCTIONAL
Brand: VIVACIT-E®

## 2025-04-14 DEVICE — IMPLANTABLE DEVICE
Type: IMPLANTABLE DEVICE | Site: HIP | Status: FUNCTIONAL
Brand: G7® DUAL MOBILITY ACETABULAR SYSTEM

## 2025-04-14 DEVICE — IMPLANTABLE DEVICE
Type: IMPLANTABLE DEVICE | Site: HIP | Status: FUNCTIONAL
Brand: G7® ACETABULAR SYSTEM

## 2025-04-14 DEVICE — IMPLANTABLE DEVICE
Type: IMPLANTABLE DEVICE | Site: HIP | Status: FUNCTIONAL
Brand: BIOLOX® DELTA MODULAR CERAMIC HEAD

## 2025-04-14 RX ORDER — SODIUM CHLORIDE, SODIUM LACTATE, POTASSIUM CHLORIDE, CALCIUM CHLORIDE 600; 310; 30; 20 MG/100ML; MG/100ML; MG/100ML; MG/100ML
INJECTION, SOLUTION INTRAVENOUS CONTINUOUS
Status: DISCONTINUED | OUTPATIENT
Start: 2025-04-14 | End: 2025-04-15 | Stop reason: HOSPADM

## 2025-04-14 RX ORDER — HYDROMORPHONE HCL IN WATER/PF 6 MG/30 ML
0.4 PATIENT CONTROLLED ANALGESIA SYRINGE INTRAVENOUS EVERY 5 MIN PRN
Status: DISCONTINUED | OUTPATIENT
Start: 2025-04-14 | End: 2025-04-14 | Stop reason: HOSPADM

## 2025-04-14 RX ORDER — PRAZOSIN HYDROCHLORIDE 1 MG/1
3 CAPSULE ORAL AT BEDTIME
Status: DISCONTINUED | OUTPATIENT
Start: 2025-04-14 | End: 2025-04-15 | Stop reason: HOSPADM

## 2025-04-14 RX ORDER — FENTANYL CITRATE 50 UG/ML
50 INJECTION, SOLUTION INTRAMUSCULAR; INTRAVENOUS EVERY 5 MIN PRN
Status: DISCONTINUED | OUTPATIENT
Start: 2025-04-14 | End: 2025-04-14 | Stop reason: HOSPADM

## 2025-04-14 RX ORDER — DIPHENHYDRAMINE HCL 25 MG
25 CAPSULE ORAL EVERY 6 HOURS PRN
Status: DISCONTINUED | OUTPATIENT
Start: 2025-04-14 | End: 2025-04-15 | Stop reason: HOSPADM

## 2025-04-14 RX ORDER — OXYBUTYNIN CHLORIDE 5 MG/1
30 TABLET, EXTENDED RELEASE ORAL DAILY
Status: DISCONTINUED | OUTPATIENT
Start: 2025-04-14 | End: 2025-04-15 | Stop reason: HOSPADM

## 2025-04-14 RX ORDER — POLYETHYLENE GLYCOL 3350 17 G/17G
17 POWDER, FOR SOLUTION ORAL DAILY
Status: DISCONTINUED | OUTPATIENT
Start: 2025-04-15 | End: 2025-04-15 | Stop reason: HOSPADM

## 2025-04-14 RX ORDER — DEXAMETHASONE SODIUM PHOSPHATE 4 MG/ML
4 INJECTION, SOLUTION INTRA-ARTICULAR; INTRALESIONAL; INTRAMUSCULAR; INTRAVENOUS; SOFT TISSUE
Status: DISCONTINUED | OUTPATIENT
Start: 2025-04-14 | End: 2025-04-14 | Stop reason: HOSPADM

## 2025-04-14 RX ORDER — HYDROXYZINE HYDROCHLORIDE 25 MG/1
25-50 TABLET, FILM COATED ORAL EVERY 6 HOURS PRN
Qty: 30 TABLET | Refills: 0 | Status: SHIPPED | OUTPATIENT
Start: 2025-04-14

## 2025-04-14 RX ORDER — DEXAMETHASONE SODIUM PHOSPHATE 4 MG/ML
INJECTION, SOLUTION INTRA-ARTICULAR; INTRALESIONAL; INTRAMUSCULAR; INTRAVENOUS; SOFT TISSUE PRN
Status: DISCONTINUED | OUTPATIENT
Start: 2025-04-14 | End: 2025-04-14

## 2025-04-14 RX ORDER — LABETALOL HYDROCHLORIDE 5 MG/ML
10 INJECTION, SOLUTION INTRAVENOUS
Status: DISCONTINUED | OUTPATIENT
Start: 2025-04-14 | End: 2025-04-14 | Stop reason: HOSPADM

## 2025-04-14 RX ORDER — OXYCODONE HYDROCHLORIDE 5 MG/1
5 TABLET ORAL
Status: COMPLETED | OUTPATIENT
Start: 2025-04-14 | End: 2025-04-14

## 2025-04-14 RX ORDER — LIDOCAINE HYDROCHLORIDE 20 MG/ML
INJECTION, SOLUTION INFILTRATION; PERINEURAL PRN
Status: DISCONTINUED | OUTPATIENT
Start: 2025-04-14 | End: 2025-04-14

## 2025-04-14 RX ORDER — FENTANYL CITRATE 50 UG/ML
25 INJECTION, SOLUTION INTRAMUSCULAR; INTRAVENOUS EVERY 5 MIN PRN
Status: DISCONTINUED | OUTPATIENT
Start: 2025-04-14 | End: 2025-04-14 | Stop reason: HOSPADM

## 2025-04-14 RX ORDER — NALOXONE HYDROCHLORIDE 0.4 MG/ML
0.4 INJECTION, SOLUTION INTRAMUSCULAR; INTRAVENOUS; SUBCUTANEOUS
Status: DISCONTINUED | OUTPATIENT
Start: 2025-04-14 | End: 2025-04-15 | Stop reason: HOSPADM

## 2025-04-14 RX ORDER — CEFAZOLIN SODIUM 2 G/50ML
2 SOLUTION INTRAVENOUS EVERY 8 HOURS
Status: COMPLETED | OUTPATIENT
Start: 2025-04-14 | End: 2025-04-15

## 2025-04-14 RX ORDER — PROPOFOL 10 MG/ML
INJECTION, EMULSION INTRAVENOUS PRN
Status: DISCONTINUED | OUTPATIENT
Start: 2025-04-14 | End: 2025-04-14

## 2025-04-14 RX ORDER — ONDANSETRON 2 MG/ML
4 INJECTION INTRAMUSCULAR; INTRAVENOUS EVERY 30 MIN PRN
Status: DISCONTINUED | OUTPATIENT
Start: 2025-04-14 | End: 2025-04-14 | Stop reason: HOSPADM

## 2025-04-14 RX ORDER — FENTANYL CITRATE 50 UG/ML
INJECTION, SOLUTION INTRAMUSCULAR; INTRAVENOUS PRN
Status: DISCONTINUED | OUTPATIENT
Start: 2025-04-14 | End: 2025-04-14

## 2025-04-14 RX ORDER — NALOXONE HYDROCHLORIDE 0.4 MG/ML
0.2 INJECTION, SOLUTION INTRAMUSCULAR; INTRAVENOUS; SUBCUTANEOUS
Status: DISCONTINUED | OUTPATIENT
Start: 2025-04-14 | End: 2025-04-15 | Stop reason: HOSPADM

## 2025-04-14 RX ORDER — ACETAMINOPHEN 325 MG/1
650 TABLET ORAL EVERY 4 HOURS PRN
Qty: 100 TABLET | Refills: 0 | Status: SHIPPED | OUTPATIENT
Start: 2025-04-14

## 2025-04-14 RX ORDER — SODIUM CHLORIDE, SODIUM LACTATE, POTASSIUM CHLORIDE, CALCIUM CHLORIDE 600; 310; 30; 20 MG/100ML; MG/100ML; MG/100ML; MG/100ML
INJECTION, SOLUTION INTRAVENOUS CONTINUOUS
Status: DISCONTINUED | OUTPATIENT
Start: 2025-04-14 | End: 2025-04-14 | Stop reason: HOSPADM

## 2025-04-14 RX ORDER — HYDROMORPHONE HCL IN WATER/PF 6 MG/30 ML
0.2 PATIENT CONTROLLED ANALGESIA SYRINGE INTRAVENOUS EVERY 5 MIN PRN
Status: DISCONTINUED | OUTPATIENT
Start: 2025-04-14 | End: 2025-04-14 | Stop reason: HOSPADM

## 2025-04-14 RX ORDER — HYDROCHLOROTHIAZIDE 25 MG/1
25 TABLET ORAL DAILY
Status: DISCONTINUED | OUTPATIENT
Start: 2025-04-14 | End: 2025-04-15 | Stop reason: HOSPADM

## 2025-04-14 RX ORDER — NALOXONE HYDROCHLORIDE 0.4 MG/ML
0.1 INJECTION, SOLUTION INTRAMUSCULAR; INTRAVENOUS; SUBCUTANEOUS
Status: DISCONTINUED | OUTPATIENT
Start: 2025-04-14 | End: 2025-04-14 | Stop reason: HOSPADM

## 2025-04-14 RX ORDER — BISACODYL 10 MG
10 SUPPOSITORY, RECTAL RECTAL DAILY PRN
Status: DISCONTINUED | OUTPATIENT
Start: 2025-04-14 | End: 2025-04-15 | Stop reason: HOSPADM

## 2025-04-14 RX ORDER — ONDANSETRON 2 MG/ML
INJECTION INTRAMUSCULAR; INTRAVENOUS PRN
Status: DISCONTINUED | OUTPATIENT
Start: 2025-04-14 | End: 2025-04-14

## 2025-04-14 RX ORDER — AMOXICILLIN 250 MG
1-2 CAPSULE ORAL 2 TIMES DAILY
Qty: 30 TABLET | Refills: 0 | Status: SHIPPED | OUTPATIENT
Start: 2025-04-14

## 2025-04-14 RX ORDER — CEFAZOLIN SODIUM/WATER 3 G/30 ML
3 SYRINGE (ML) INTRAVENOUS
Status: COMPLETED | OUTPATIENT
Start: 2025-04-14 | End: 2025-04-14

## 2025-04-14 RX ORDER — SENNOSIDES 8.6 MG
325 CAPSULE ORAL DAILY
Qty: 70 TABLET | Refills: 0 | Status: SHIPPED | OUTPATIENT
Start: 2025-04-14 | End: 2025-04-14

## 2025-04-14 RX ORDER — KETOROLAC TROMETHAMINE 30 MG/ML
INJECTION, SOLUTION INTRAMUSCULAR; INTRAVENOUS PRN
Status: DISCONTINUED | OUTPATIENT
Start: 2025-04-14 | End: 2025-04-14

## 2025-04-14 RX ORDER — HYDROXYZINE HYDROCHLORIDE 25 MG/1
25 TABLET, FILM COATED ORAL EVERY 6 HOURS PRN
Qty: 30 TABLET | Refills: 0 | Status: SHIPPED | OUTPATIENT
Start: 2025-04-14 | End: 2025-04-14

## 2025-04-14 RX ORDER — PANTOPRAZOLE SODIUM 40 MG/1
40 TABLET, DELAYED RELEASE ORAL
Status: DISCONTINUED | OUTPATIENT
Start: 2025-04-14 | End: 2025-04-15 | Stop reason: HOSPADM

## 2025-04-14 RX ORDER — SODIUM CHLORIDE, SODIUM LACTATE, POTASSIUM CHLORIDE, CALCIUM CHLORIDE 600; 310; 30; 20 MG/100ML; MG/100ML; MG/100ML; MG/100ML
INJECTION, SOLUTION INTRAVENOUS CONTINUOUS PRN
Status: DISCONTINUED | OUTPATIENT
Start: 2025-04-14 | End: 2025-04-14

## 2025-04-14 RX ORDER — ACETAMINOPHEN 325 MG/1
975 TABLET ORAL EVERY 8 HOURS
Status: DISCONTINUED | OUTPATIENT
Start: 2025-04-14 | End: 2025-04-15 | Stop reason: HOSPADM

## 2025-04-14 RX ORDER — LIDOCAINE 40 MG/G
CREAM TOPICAL
Status: DISCONTINUED | OUTPATIENT
Start: 2025-04-14 | End: 2025-04-14 | Stop reason: HOSPADM

## 2025-04-14 RX ORDER — CELECOXIB 200 MG/1
400 CAPSULE ORAL ONCE
Status: COMPLETED | OUTPATIENT
Start: 2025-04-14 | End: 2025-04-14

## 2025-04-14 RX ORDER — ONDANSETRON 4 MG/1
4 TABLET, ORALLY DISINTEGRATING ORAL EVERY 30 MIN PRN
Status: DISCONTINUED | OUTPATIENT
Start: 2025-04-14 | End: 2025-04-14 | Stop reason: HOSPADM

## 2025-04-14 RX ORDER — TRANEXAMIC ACID 650 MG/1
1950 TABLET ORAL ONCE
Status: COMPLETED | OUTPATIENT
Start: 2025-04-14 | End: 2025-04-14

## 2025-04-14 RX ORDER — ACETAMINOPHEN 325 MG/1
975 TABLET ORAL
Status: DISCONTINUED | OUTPATIENT
Start: 2025-04-14 | End: 2025-04-14 | Stop reason: HOSPADM

## 2025-04-14 RX ORDER — ONDANSETRON 4 MG/1
4 TABLET, ORALLY DISINTEGRATING ORAL EVERY 6 HOURS PRN
Status: DISCONTINUED | OUTPATIENT
Start: 2025-04-14 | End: 2025-04-15 | Stop reason: HOSPADM

## 2025-04-14 RX ORDER — LIDOCAINE 40 MG/G
CREAM TOPICAL
Status: DISCONTINUED | OUTPATIENT
Start: 2025-04-14 | End: 2025-04-15 | Stop reason: HOSPADM

## 2025-04-14 RX ORDER — FERROUS SULFATE 325(65) MG
325 TABLET ORAL
Status: DISCONTINUED | OUTPATIENT
Start: 2025-04-15 | End: 2025-04-15 | Stop reason: HOSPADM

## 2025-04-14 RX ORDER — OXYCODONE HYDROCHLORIDE 10 MG/1
10 TABLET ORAL EVERY 4 HOURS PRN
Status: DISCONTINUED | OUTPATIENT
Start: 2025-04-14 | End: 2025-04-15 | Stop reason: HOSPADM

## 2025-04-14 RX ORDER — LISINOPRIL 10 MG/1
10 TABLET ORAL DAILY
Status: DISCONTINUED | OUTPATIENT
Start: 2025-04-15 | End: 2025-04-15 | Stop reason: HOSPADM

## 2025-04-14 RX ORDER — CEPHALEXIN 500 MG/1
500 CAPSULE ORAL 4 TIMES DAILY
Qty: 40 CAPSULE | Refills: 0 | Status: SHIPPED | OUTPATIENT
Start: 2025-04-14 | End: 2025-04-24

## 2025-04-14 RX ORDER — AMOXICILLIN 250 MG
1 CAPSULE ORAL 2 TIMES DAILY
Status: DISCONTINUED | OUTPATIENT
Start: 2025-04-14 | End: 2025-04-15 | Stop reason: HOSPADM

## 2025-04-14 RX ORDER — MIRABEGRON 50 MG/1
50 TABLET, FILM COATED, EXTENDED RELEASE ORAL DAILY
Status: DISCONTINUED | OUTPATIENT
Start: 2025-04-15 | End: 2025-04-15 | Stop reason: HOSPADM

## 2025-04-14 RX ORDER — SENNOSIDES 8.6 MG
325 CAPSULE ORAL DAILY
Status: DISCONTINUED | OUTPATIENT
Start: 2025-04-14 | End: 2025-04-15 | Stop reason: HOSPADM

## 2025-04-14 RX ORDER — VANCOMYCIN HYDROCHLORIDE 1 G/20ML
INJECTION, POWDER, LYOPHILIZED, FOR SOLUTION INTRAVENOUS PRN
Status: DISCONTINUED | OUTPATIENT
Start: 2025-04-14 | End: 2025-04-14 | Stop reason: HOSPADM

## 2025-04-14 RX ORDER — OXYCODONE HYDROCHLORIDE 5 MG/1
5 TABLET ORAL EVERY 4 HOURS PRN
Status: DISCONTINUED | OUTPATIENT
Start: 2025-04-14 | End: 2025-04-15 | Stop reason: HOSPADM

## 2025-04-14 RX ORDER — OLANZAPINE 10 MG/1
10 TABLET, FILM COATED ORAL EVERY EVENING
COMMUNITY

## 2025-04-14 RX ORDER — OLANZAPINE 10 MG/1
10 TABLET, FILM COATED ORAL AT BEDTIME
Status: DISCONTINUED | OUTPATIENT
Start: 2025-04-14 | End: 2025-04-15 | Stop reason: HOSPADM

## 2025-04-14 RX ORDER — HYDROXYZINE HYDROCHLORIDE 25 MG/1
25 TABLET, FILM COATED ORAL EVERY 6 HOURS PRN
Status: DISCONTINUED | OUTPATIENT
Start: 2025-04-14 | End: 2025-04-15 | Stop reason: HOSPADM

## 2025-04-14 RX ORDER — OXYCODONE HYDROCHLORIDE 5 MG/1
5-10 TABLET ORAL EVERY 4 HOURS PRN
Qty: 26 TABLET | Refills: 0 | Status: SHIPPED | OUTPATIENT
Start: 2025-04-14

## 2025-04-14 RX ORDER — FAMOTIDINE 20 MG/1
20 TABLET, FILM COATED ORAL 2 TIMES DAILY
Status: DISCONTINUED | OUTPATIENT
Start: 2025-04-14 | End: 2025-04-15 | Stop reason: HOSPADM

## 2025-04-14 RX ORDER — DULOXETIN HYDROCHLORIDE 30 MG/1
120 CAPSULE, DELAYED RELEASE ORAL DAILY
Status: DISCONTINUED | OUTPATIENT
Start: 2025-04-15 | End: 2025-04-15 | Stop reason: HOSPADM

## 2025-04-14 RX ORDER — SENNOSIDES 8.6 MG
325 CAPSULE ORAL 2 TIMES DAILY
Qty: 70 TABLET | Refills: 0 | Status: SHIPPED | OUTPATIENT
Start: 2025-04-14

## 2025-04-14 RX ORDER — ONDANSETRON 2 MG/ML
4 INJECTION INTRAMUSCULAR; INTRAVENOUS EVERY 6 HOURS PRN
Status: DISCONTINUED | OUTPATIENT
Start: 2025-04-14 | End: 2025-04-15 | Stop reason: HOSPADM

## 2025-04-14 RX ORDER — IBUPROFEN 600 MG/1
600 TABLET, FILM COATED ORAL EVERY 6 HOURS PRN
Qty: 30 TABLET | Refills: 0 | Status: SHIPPED | OUTPATIENT
Start: 2025-04-14

## 2025-04-14 RX ORDER — CEFAZOLIN SODIUM/WATER 3 G/30 ML
3 SYRINGE (ML) INTRAVENOUS SEE ADMIN INSTRUCTIONS
Status: DISCONTINUED | OUTPATIENT
Start: 2025-04-14 | End: 2025-04-14 | Stop reason: HOSPADM

## 2025-04-14 RX ORDER — PROCHLORPERAZINE MALEATE 5 MG/1
10 TABLET ORAL EVERY 6 HOURS PRN
Status: DISCONTINUED | OUTPATIENT
Start: 2025-04-14 | End: 2025-04-15 | Stop reason: HOSPADM

## 2025-04-14 RX ORDER — HYDROMORPHONE HCL IN WATER/PF 6 MG/30 ML
0.4 PATIENT CONTROLLED ANALGESIA SYRINGE INTRAVENOUS
Status: DISCONTINUED | OUTPATIENT
Start: 2025-04-14 | End: 2025-04-15 | Stop reason: HOSPADM

## 2025-04-14 RX ORDER — HYDROMORPHONE HCL IN WATER/PF 6 MG/30 ML
0.2 PATIENT CONTROLLED ANALGESIA SYRINGE INTRAVENOUS
Status: DISCONTINUED | OUTPATIENT
Start: 2025-04-14 | End: 2025-04-15 | Stop reason: HOSPADM

## 2025-04-14 RX ADMIN — LIDOCAINE HYDROCHLORIDE 50 MG: 20 INJECTION, SOLUTION INFILTRATION; PERINEURAL at 07:33

## 2025-04-14 RX ADMIN — PROPOFOL 300 MG: 10 INJECTION, EMULSION INTRAVENOUS at 07:33

## 2025-04-14 RX ADMIN — FENTANYL CITRATE 50 MCG: 50 INJECTION, SOLUTION INTRAMUSCULAR; INTRAVENOUS at 10:40

## 2025-04-14 RX ADMIN — TRANEXAMIC ACID 1950 MG: 650 TABLET ORAL at 06:04

## 2025-04-14 RX ADMIN — ROCURONIUM BROMIDE 50 MG: 50 INJECTION, SOLUTION INTRAVENOUS at 07:33

## 2025-04-14 RX ADMIN — ACETAMINOPHEN 975 MG: 325 TABLET, FILM COATED ORAL at 20:14

## 2025-04-14 RX ADMIN — KETOROLAC TROMETHAMINE 30 MG: 30 INJECTION, SOLUTION INTRAMUSCULAR at 08:05

## 2025-04-14 RX ADMIN — DEXAMETHASONE SODIUM PHOSPHATE 8 MG: 4 INJECTION, SOLUTION INTRA-ARTICULAR; INTRALESIONAL; INTRAMUSCULAR; INTRAVENOUS; SOFT TISSUE at 07:33

## 2025-04-14 RX ADMIN — MIDAZOLAM 2 MG: 1 INJECTION INTRAMUSCULAR; INTRAVENOUS at 07:27

## 2025-04-14 RX ADMIN — SODIUM CHLORIDE, SODIUM LACTATE, POTASSIUM CHLORIDE, AND CALCIUM CHLORIDE: .6; .31; .03; .02 INJECTION, SOLUTION INTRAVENOUS at 06:27

## 2025-04-14 RX ADMIN — FAMOTIDINE 20 MG: 20 TABLET, FILM COATED ORAL at 20:14

## 2025-04-14 RX ADMIN — Medication 3 G: at 07:28

## 2025-04-14 RX ADMIN — SODIUM CHLORIDE, SODIUM LACTATE, POTASSIUM CHLORIDE, AND CALCIUM CHLORIDE: .6; .31; .03; .02 INJECTION, SOLUTION INTRAVENOUS at 07:15

## 2025-04-14 RX ADMIN — ONDANSETRON 4 MG: 2 INJECTION INTRAMUSCULAR; INTRAVENOUS at 07:54

## 2025-04-14 RX ADMIN — OXYCODONE HYDROCHLORIDE 5 MG: 5 TABLET ORAL at 11:27

## 2025-04-14 RX ADMIN — SUGAMMADEX 100 MG: 100 INJECTION, SOLUTION INTRAVENOUS at 09:55

## 2025-04-14 RX ADMIN — SODIUM CHLORIDE, POTASSIUM CHLORIDE, SODIUM LACTATE AND CALCIUM CHLORIDE: 600; 310; 30; 20 INJECTION, SOLUTION INTRAVENOUS at 19:19

## 2025-04-14 RX ADMIN — SODIUM CHLORIDE, SODIUM LACTATE, POTASSIUM CHLORIDE, AND CALCIUM CHLORIDE: .6; .31; .03; .02 INJECTION, SOLUTION INTRAVENOUS at 09:21

## 2025-04-14 RX ADMIN — ROCURONIUM BROMIDE 20 MG: 50 INJECTION, SOLUTION INTRAVENOUS at 09:00

## 2025-04-14 RX ADMIN — SODIUM CHLORIDE, POTASSIUM CHLORIDE, SODIUM LACTATE AND CALCIUM CHLORIDE: 600; 310; 30; 20 INJECTION, SOLUTION INTRAVENOUS at 13:57

## 2025-04-14 RX ADMIN — HYDROMORPHONE HYDROCHLORIDE 0.2 MG: 0.2 INJECTION, SOLUTION INTRAMUSCULAR; INTRAVENOUS; SUBCUTANEOUS at 10:56

## 2025-04-14 RX ADMIN — OXYCODONE HYDROCHLORIDE 10 MG: 10 TABLET ORAL at 22:00

## 2025-04-14 RX ADMIN — CEFAZOLIN SODIUM 2 G: 2 SOLUTION INTRAVENOUS at 15:56

## 2025-04-14 RX ADMIN — HYDROMORPHONE HYDROCHLORIDE 1 MG: 1 INJECTION, SOLUTION INTRAMUSCULAR; INTRAVENOUS; SUBCUTANEOUS at 08:05

## 2025-04-14 RX ADMIN — DEXMEDETOMIDINE HYDROCHLORIDE 0.5 MCG/KG/HR: 100 INJECTION, SOLUTION INTRAVENOUS at 07:37

## 2025-04-14 RX ADMIN — FENTANYL CITRATE 100 MCG: 50 INJECTION INTRAMUSCULAR; INTRAVENOUS at 07:33

## 2025-04-14 RX ADMIN — FENTANYL CITRATE 50 MCG: 50 INJECTION, SOLUTION INTRAMUSCULAR; INTRAVENOUS at 10:48

## 2025-04-14 RX ADMIN — OLANZAPINE 10 MG: 10 TABLET, FILM COATED ORAL at 21:59

## 2025-04-14 RX ADMIN — SUGAMMADEX 100 MG: 100 INJECTION, SOLUTION INTRAVENOUS at 09:33

## 2025-04-14 RX ADMIN — ACETAMINOPHEN 975 MG: 325 TABLET, FILM COATED ORAL at 13:57

## 2025-04-14 RX ADMIN — ASPIRIN 325 MG: 325 TABLET, COATED ORAL at 13:57

## 2025-04-14 RX ADMIN — SENNOSIDES AND DOCUSATE SODIUM 1 TABLET: 50; 8.6 TABLET ORAL at 21:58

## 2025-04-14 RX ADMIN — CELECOXIB 400 MG: 200 CAPSULE ORAL at 06:04

## 2025-04-14 RX ADMIN — OXYCODONE HYDROCHLORIDE 5 MG: 5 TABLET ORAL at 17:28

## 2025-04-14 ASSESSMENT — ACTIVITIES OF DAILY LIVING (ADL)
ADLS_ACUITY_SCORE: 31
ADLS_ACUITY_SCORE: 37
ADLS_ACUITY_SCORE: 32
ADLS_ACUITY_SCORE: 38
ADLS_ACUITY_SCORE: 38
ADLS_ACUITY_SCORE: 37
ADLS_ACUITY_SCORE: 32
ADLS_ACUITY_SCORE: 38
ADLS_ACUITY_SCORE: 37
ADLS_ACUITY_SCORE: 37
ADLS_ACUITY_SCORE: 38
ADLS_ACUITY_SCORE: 32
ADLS_ACUITY_SCORE: 37
ADLS_ACUITY_SCORE: 37
ADLS_ACUITY_SCORE: 32
ADLS_ACUITY_SCORE: 38

## 2025-04-14 ASSESSMENT — LIFESTYLE VARIABLES: TOBACCO_USE: 1

## 2025-04-14 ASSESSMENT — ENCOUNTER SYMPTOMS: DYSRHYTHMIAS: 0

## 2025-04-14 NOTE — PLAN OF CARE
"Goal Outcome Evaluation:      Plan of Care Reviewed With: patient    Overall Patient Progress: improvingOverall Patient Progress: improving    Outcome Evaluation: Pt up walking hallways with PT Assist of 1 gb/walker. Reg diet, needs help ordering. Pain managed with Oxy Tylenol.  due to void - will bladder scan    Pt A/Ox4 Pt up with assist of 1 gb/walker.  Uses cane at baseline. Pt has 2 legal guardians. Pt due to void. Will bladder scan again. LR infusing 100ml/hr. Regular diet being tolerated, pt needs help ordering. Pt able to void this evening via urinal.    Problem: Adult Inpatient Plan of Care  Goal: Plan of Care Review  Description: The Plan of Care Review/Shift note should be completed every shift.  The Outcome Evaluation is a brief statement about your assessment that the patient is improving, declining, or no change.  This information will be displayed automatically on your shiftnote.  Outcome: Progressing  Flowsheets (Taken 4/14/2025 4615)  Outcome Evaluation: Pt up walking hallways with PT Assist of 1 gb/walker. Reg diet, needs help ordering. Pain managed with Oxy Tylenol.  due to void - will bladder scan  Plan of Care Reviewed With: patient  Overall Patient Progress: improving  Goal: Patient-Specific Goal (Individualized)  Description: You can add care plan individualizations to a care plan. Examples of Individualization might be:  \"Parent requests to be called daily at 9am for status\", \"I have a hard time hearing out of my right ear\", or \"Do not touch me to wake me up as it startlesme\".  Outcome: Progressing  Goal: Absence of Hospital-Acquired Illness or Injury  Outcome: Progressing  Intervention: Identify and Manage Fall Risk  Recent Flowsheet Documentation  Taken 4/14/2025 1700 by Sandip Christopher, RN  Safety Promotion/Fall Prevention:   activity supervised   assistive device/personal items within reach   safety round/check completed   room organization consistent   room near nurse's " station  Intervention: Prevent Skin Injury  Recent Flowsheet Documentation  Taken 4/14/2025 1728 by Sandip Christopher, RN  Body Position: supine, head elevated  Taken 4/14/2025 1700 by Sandip Christopher RN  Body Position: supine, head elevated  Intervention: Prevent and Manage VTE (Venous Thromboembolism) Risk  Recent Flowsheet Documentation  Taken 4/14/2025 1700 by Sandip Christopher RN  VTE Prevention/Management: SCDs on (sequential compression devices)  Goal: Optimal Comfort and Wellbeing  Outcome: Progressing  Goal: Readiness for Transition of Care  Outcome: Progressing     Problem: Skin Injury Risk Increased  Goal: Skin Health and Integrity  Outcome: Progressing  Intervention: Plan: Nurse Driven Intervention: Moisture Management  Recent Flowsheet Documentation  Taken 4/14/2025 1700 by Sandip Christopher RN  Moisture Interventions:   Encourage regular toileting   No brief in bed  Bathing/Skin Care: dressed/undressed  Intervention: Optimize Skin Protection  Recent Flowsheet Documentation  Taken 4/14/2025 1728 by Sandip Christopher, RN  Activity Management:   activity adjusted per tolerance   ambulated in room   back to bed  Taken 4/14/2025 1700 by Sandip Christopher RN  Activity Management:   activity adjusted per tolerance   activity encouraged     Problem: Pain Acute  Goal: Optimal Pain Control and Function  Outcome: Progressing  Intervention: Optimize Psychosocial Wellbeing  Recent Flowsheet Documentation  Taken 4/14/2025 1700 by Sandip Christopher RN  Diversional Activities: television     Problem: Hip Arthroplasty  Goal: Optimal Coping  Outcome: Progressing  Goal: Absence of Bleeding  Outcome: Progressing  Goal: Effective Bowel Elimination  Outcome: Progressing  Goal: Fluid and Electrolyte Balance  Outcome: Progressing  Goal: Optimal Functional Ability  Outcome: Progressing  Intervention: Promote Optimal Functional Status  Recent Flowsheet Documentation  Taken 4/14/2025 1728 by Sandip Christopher RN  Assistive Device  Utilized:   gait belt   walker  Activity Management:   activity adjusted per tolerance   ambulated in room   back to bed  Taken 4/14/2025 1700 by Sandip Christopher, RN  Activity Management:   activity adjusted per tolerance   activity encouraged  Goal: Absence of Infection Signs and Symptoms  Outcome: Progressing  Goal: Intact Neurovascular Status  Outcome: Progressing  Goal: Anesthesia/Sedation Recovery  Outcome: Progressing  Intervention: Optimize Anesthesia Recovery  Recent Flowsheet Documentation  Taken 4/14/2025 1700 by Sandip Christopher, RN  Safety Promotion/Fall Prevention:   activity supervised   assistive device/personal items within reach   safety round/check completed   room organization consistent   room near nurse's station  Goal: Optimal Pain Control and Function  Outcome: Progressing  Intervention: Prevent or Manage Pain  Recent Flowsheet Documentation  Taken 4/14/2025 1700 by Sadnip Christopher, RN  Diversional Activities: television  Goal: Nausea and Vomiting Relief  Outcome: Progressing  Goal: Effective Urinary Elimination  Outcome: Progressing  Goal: Effective Oxygenation and Ventilation  Outcome: Progressing  Intervention: Optimize Oxygenation and Ventilation  Recent Flowsheet Documentation  Taken 4/14/2025 1728 by Sandip Christopher, RN  Activity Management:   activity adjusted per tolerance   ambulated in room   back to bed  Taken 4/14/2025 1700 by Sandip Christopher, RN  Activity Management:   activity adjusted per tolerance   activity encouraged

## 2025-04-14 NOTE — ANESTHESIA PROCEDURE NOTES
Airway       Patient location during procedure: OR       Procedure Start/Stop Times: 4/14/2025 7:35 AM  Staff -        Anesthesiologist:  Asha Malik MD       CRNA: Silvestre Fields APRN CRNA       Performed By: CRNA  Consent for Airway        Urgency: elective  Indications and Patient Condition       Indications for airway management: cisco-procedural       Induction type:intravenous       Mask difficulty assessment: 1 - vent by mask    Final Airway Details       Final airway type: endotracheal airway       Successful airway: ETT - single  Endotracheal Airway Details        ETT size (mm): 8.0       Cuffed: yes       Successful intubation technique: direct laryngoscopy       DL Blade Type: MAC 4       Grade View of Cords: 1       Adjucts: stylet       Position: Right       Measured from: lips       Secured at (cm): 24       Bite block used: Oral Airway    Post intubation assessment        Placement verified by: capnometry, equal breath sounds and chest rise        Number of attempts at approach: 1       Number of other approaches attempted: 0       Secured with: tape       Ease of procedure: easy       Dentition: Intact and Unchanged    Medication(s) Administered   Medication Administration Time: 4/14/2025 7:35 AM

## 2025-04-14 NOTE — PROGRESS NOTES
[facility]                                                                                   OUTPATIENT PHYSICAL THERAPY    PLAN OF TREATMENT FOR OUTPATIENT REHABILITATION   Patient's Last Name, First Name, Kimo Diez YOB: 1980   Provider's Name   [facility]   Medical Record No.  [id number]     Onset Date: 04/14/25 Start of Care Date:  4/14/25     Medical Diagnosis:  R MARGO                PT Diagnosis:  impaired functional mobility Certification Dates:  From:  4/14/25  To:  4/15/25       See note for plan of treatment, functional goals, and certification details.    I CERTIFY THE NEED FOR THESE SERVICES FURNISHED UNDER        THIS PLAN OF TREATMENT AND WHILE UNDER MY CARE (Physician co-signature of this document indicates review and certification of the therapy plan).              04/14/25 6021   Appointment Info   Signing Clinician's Name / Credentials (PT) Santiago Calvin DPT   Rehab Comments (PT) WBAT R LE, no prec   Living Environment   Living Environment Comments Pt reports living in apt, 5 stairs to manage with R railing. Use of SEC at baseline. Will be recovering at parents apt until Saturday, no stairs to manage there.   Self-Care   Usual Activity Tolerance moderate   Current Activity Tolerance moderate   Regular Exercise Yes   Activity/Exercise Type other (see comments)  (pool therapy 1x/week)   Equipment Currently Used at Home cane, straight;grab bar, toilet;grab bar, tub/shower;raised toilet seat;tub bench;walker, rolling   Fall history within last six months no   General Information   Onset of Illness/Injury or Date of Surgery 04/14/25   Referring Physician Bala Randall MD   Patient/Family Therapy Goals Statement (PT) To improve mobility   Pertinent History of Current Problem (include personal factors and/or comorbidities that impact the POC) Pt is a 44 year old male POD0 s/p R MARGO. Pt reports hx  previous L MARGO and revision.   Existing Precautions/Restrictions weight bearing   Weight-Bearing Status - RLE weight-bearing as tolerated   Cognition   Affect/Mental Status (Cognition) WFL   Orientation Status (Cognition) oriented x 4   Follows Commands (Cognition) WFL   Pain Assessment   Patient Currently in Pain Yes, see Vital Sign flowsheet  (R hip 7/10)   Posture    Posture Comments B knee valgum   Range of Motion (ROM)   ROM Comment decreased L hip flexion   Strength (Manual Muscle Testing)   Strength Comments able to complete B SLR   Bed Mobility   Comment, (Bed Mobility) SBA supine to sit with handrail   Transfers   Comment, (Transfers) CGA sit < >Stand with FWW   Gait/Stairs (Locomotion)   Distance in Feet (Gait) 20   Pattern (Gait) step-to   Deviations/Abnormal Patterns (Gait) antalgic;base of support, wide;gait speed decreased;stride length decreased;weight shifting decreased   Comment, (Gait/Stairs) CGA with FWW   Balance   Balance Comments good sitting; fair+ standing with FWW   Clinical Impression   Criteria for Skilled Therapeutic Intervention Yes, treatment indicated   PT Diagnosis (PT) impaired functional mobility   Influenced by the following impairments decreased R hip ROM, decreased balance   Functional limitations due to impairments impaired bed mobility, transfers, ambulation, stairs   Clinical Presentation (PT Evaluation Complexity) stable   Clinical Presentation Rationale Pt medically stable   Clinical Decision Making (Complexity) low complexity   Planned Therapy Interventions (PT) balance training;bed mobility training;cryotherapy;gait training;home exercise program;neuromuscular re-education;patient/family education;ROM (range of motion);stair training;strengthening;transfer training;progressive activity/exercise   Risk & Benefits of therapy have been explained evaluation/treatment results reviewed;care plan/treatment goals reviewed;risks/benefits reviewed;current/potential barriers  reviewed;participants voiced agreement with care plan;participants included;patient;mother;father   PT Total Evaluation Time   PT Eval, Low Complexity Minutes (08542) 10   Physical Therapy Goals   PT Frequency Daily   PT Predicted Duration/Target Date for Goal Attainment 04/15/25   PT Goals Bed Mobility;Gait;Transfers;Stairs   PT: Bed Mobility Independent;Supine to/from sit   PT: Transfers Modified independent;Sit to/from stand;Assistive device   PT: Gait Modified independent;Assistive device;Greater than 200 feet   PT: Stairs Supervision/stand-by assist;5 stairs;Rail on right;Assistive device   PT Discharge Planning   PT Plan progress ind with transfers; inc amb distance, trial stairs; issue HD walker for d/c   PT Rationale for DC Rec Defer to ortho- anticiapte pt to demonstrate safe mobility to discharge with support from parents, use of walker.   PT Brief overview of current status Ax1 with FWW   PT Total Distance Amb During Session (feet) 110   PT Equipment Needed at Discharge walker, standard   Physical Therapy Time and Intention   Total Session Time (sum of timed and untimed services) 10

## 2025-04-14 NOTE — OP NOTE
[facility]  OPERATIVE NOTE    Pre-Op Diagnosis:  Osteoarthritis of hip [M16.9]  Post-Op Diagnosis: Same     Procedure(s):  Right total hip arthroplasty    Surgeon: Bala Randall MD    Assistant: Judy Barragan PA-C - Assisting    Anesthesia Type: General    Estimated Blood Loss: 250 cc    Specimen(s): None  Complications: None  Findings: As expected    Implant(s):   Implant Name Type Inv. Item Serial No.  Lot No. LRB No. Used Action   IMP SCR ZIM 6.5X20MM ACET CUP SELF TAP -183-20 - [id number] Metallic Hardware/[place] IMP SCR ZIM 6.5X20MM ACET CUP SELF TAP -065-20  [employer] [id number] Right 1 Implanted   IMP SCR ZIM 6.5X20MM ACET CUP SELF TAP -065-20 - [id number] Metallic Hardware/[place] IMP SCR ZIM 6.5X20MM ACET CUP SELF TAP -065-20  [employer] [id number] Right 1 Implanted   LINER ACTB G7 F 44MM COCR HIP 2 MBL - [id number] Total Joint Component/Insert LINER ACTB G7 F 44MM COCR HIP 2 MBL  [employer] [id number] Right 1 Implanted   IMP SHELL BIOM G7 ACETAB PPS NORRIS HOLE 54MM SZ F [id number] - [id number] Total Joint Component/Insert IMP SHELL BIOM G7 ACETAB PPS NORRIS HOLE 54MM SZ F [id number]  [employer] [id number] Right 1 Implanted   BEARING HIP 44MM 28MM F VIVACIT-E LUM STRL LF - [id number] Total Joint Component/Insert BEARING HIP 44MM 28MM F VIVACIT-E LUM STRL LF  [employer] [id number] Right 1 Implanted   HEAD FEM +3MM OFST 28MM HIP MDLR TY 1 BLX D G7 650-1157 - [id number] Total Joint Component/Insert HEAD FEM +3MM OFST 28MM HIP MDLR TY 1 BLX D G7 650-1157  [employer] [id number] Right 1 Implanted   IMP STEM FEM PRIM ZIM TAPERLOC HI OFFST [id number] - [id number] Total Joint Component/Insert IMP STEM FEM PRIM ZIM TAPERLOC HI OFFST [id number]  [employer] [id number] Right 1 Implanted       Indication for Procedure:   The patient is a 44 year old male with severe arthritis of the hip.  I discussed the treatment options with him and  explained the risks benefits and potential complication of the surgery he want to proceed.  Discussion of the risks included but was not specific to infection, neurologic complication, DVT, septic and aseptic loosening or fibrosis, fracture, leg length inequality, and instability of the hip.  After this discussion he wanted proceed with surgery.      Procedure Summary Narrative:  Kimo was taken to the operating room and placed on the operating table in the supine position.  After adequate induction of a spinal anesthetic he was transferred to the lateral position and held this way with the Andrew hip positioner. An axillary roll was placed, and care was taken to pad all bony prominences. He was given 2 g of Ancef for infection prophylaxis 30 min prior to incision. We then performed a sterile prep and drape of the right hip and lower extremity.  After sterile prep and drape an incision was made and proceeded with the anterior approach to the hip.  We identified the tensor fascia divided in line with its fibers.  I then took down the anterior one third of the abductors and tagged and retracted medially for lateral repair and then did a T capsulotomy preserve the capsule for later repair.  We then were able to dislocate the hip dislocation and made femoral neck cut 12 mm proximal to the less trochanter.  We removed the femoral head from the field and exposed the acetabulum.    We placed retractors anteriorly, superiorly, and posteriorly to expose the acetabulum, and reamed to 52 mm eventually put in a 54 mm cup.  We impacted the cup into position in approximately 20 degrees of anteversion and 40 degrees of abduction.  Once the cup was fully seated 2 screws were applied for additional fixation one in the inter table of the the pelvis, one in the posterior column.  Due to the patient's anatomy and history, we elected to use a dual mobility bearing for increased hip stability.  So at this point we placed the dual  mobility liner for a 44 mm dual mobility bearing.   We then directed our attention to the femur used a broach only technique broached to a size 17 standard high offset Biomet taperloc stem and reduced the hip from the very stable full extension, external rotation, flexion, adduction, internal rotation with restoration of leg length and offset.  We then obtained an intraoperative AP pelvis x-ray which showed ideal position of the cup and screws with a good fit on the femoral side with restoration of leg length and offset.  We then dislocated the hip remove the trial components irrigated using the pulsatile jet lavage.  We then put on the actual stem once it was fully seated we trialed the head and reduced the hip again and found the hip to be stable with restoration of leg length and offset with a size +3 neck on a 28 mm head and 44 mm dual mobility.  So we impacted a 28 mm +3 neck biolox ceramic femoral head with a 44 dual mobility bearing on the trunion and the hip was reduced.      We then soaked the hip in a dilute solution of Betadine for 3 minutes and irrigated using the pulsatile device.   We then repaired the capsule using 0 vicryl sutures.  Abductors repaired using #5 ethibond sutures through bone tunnels.  The fascial layer was closed using 0 Ethibond was oversewn using #1 stratafix, and we placed 1 gram of powered vancomycin deep to the fascia.  We then closed the soft tissue with absorbable sutures and staples in the skin.  Sterile dressing was then applied.  He tolerated the procedure there were no intraoperative complications.  Patient was [facility] to [facility] in [facility] condition.      Plan will be for the patient get 24 hours of Ancef followed by 10 days of oral Keflex for infection prophylaxis and 30 days of aspirin for DVT prophylaxis.      Should be noted that my assistant was vital throughout the entire procedure for the safe transfer of the patient from the hospital bed to the operating table and back  to the hospital bed is also used for retraction and closure of the wounds.     Bala Randall MD  [facility]

## 2025-04-14 NOTE — PLAN OF CARE
Goal Outcome Evaluation:      Plan of Care Reviewed With: patient    Overall Patient Progress: improvingOverall Patient Progress: improving     Arrived to unit at 1215. A&Ox4, 1L of oxygen, VSS. Pain treated with scheduled tylenol. Good appetite. Not oob this shift. Pending discharge plans      Problem: Adult Inpatient Plan of Care  Goal: Plan of Care Review  Outcome: Progressing  Flowsheets (Taken 4/14/2025 1506)  Plan of Care Reviewed With: patient  Overall Patient Progress: improving  Goal: Patient-Specific Goal (Individualized)  Outcome: Progressing  Goal: Absence of Hospital-Acquired Illness or Injury  Outcome: Progressing  Intervention: Identify and Manage Fall Risk  Recent Flowsheet Documentation  Taken 4/14/2025 1300 by Kym Swann RN  Safety Promotion/Fall Prevention:   activity supervised   assistive device/personal items within reach   safety round/check completed   room organization consistent   room near nurse's station  Intervention: Prevent and Manage VTE (Venous Thromboembolism) Risk  Recent Flowsheet Documentation  Taken 4/14/2025 1300 by Kym Swann RN  VTE Prevention/Management: SCDs on (sequential compression devices)  Intervention: Prevent Infection  Recent Flowsheet Documentation  Taken 4/14/2025 1300 by Kym Swann RN  Infection Prevention:   hand hygiene promoted   rest/sleep promoted  Goal: Optimal Comfort and Wellbeing  Outcome: Progressing  Goal: Readiness for Transition of Care  Outcome: Progressing     Problem: Skin Injury Risk Increased  Goal: Skin Health and Integrity  Outcome: Progressing  Intervention: Plan: Nurse Driven Intervention: Moisture Management  Recent Flowsheet Documentation  Taken 4/14/2025 1300 by Kym Swann RN  Moisture Interventions:   Encourage regular toileting   Incontinence pad  Intervention: Plan: Nurse Driven Intervention: Friction and Shear  Recent Flowsheet Documentation  Taken 4/14/2025 1300 by Kym Swann RN  Friction/Shear  Interventions: HOB 30 degrees or less     Problem: Pain Acute  Goal: Optimal Pain Control and Function  Outcome: Progressing     Problem: Hip Arthroplasty  Goal: Optimal Coping  Outcome: Progressing  Goal: Absence of Bleeding  Outcome: Progressing  Goal: Effective Bowel Elimination  Outcome: Progressing  Goal: Fluid and Electrolyte Balance  Outcome: Progressing  Goal: Optimal Functional Ability  Outcome: Progressing  Goal: Absence of Infection Signs and Symptoms  Outcome: Progressing  Goal: Intact Neurovascular Status  Outcome: Progressing  Goal: Anesthesia/Sedation Recovery  Outcome: Progressing  Intervention: Optimize Anesthesia Recovery  Recent Flowsheet Documentation  Taken 4/14/2025 1300 by Kym Swann RN  Safety Promotion/Fall Prevention:   activity supervised   assistive device/personal items within reach   safety round/check completed   room organization consistent   room near nurse's station  Goal: Optimal Pain Control and Function  Outcome: Progressing  Goal: Nausea and Vomiting Relief  Outcome: Progressing  Goal: Effective Urinary Elimination  Outcome: Progressing  Goal: Effective Oxygenation and Ventilation  Outcome: Progressing  Intervention: Optimize Oxygenation and Ventilation  Recent Flowsheet Documentation  Taken 4/14/2025 1300 by Kym Swann RN  Cough And Deep Breathing: done independently per patient

## 2025-04-14 NOTE — ANESTHESIA POSTPROCEDURE EVALUATION
Patient: Kimo Greenwood    Procedure: Procedure(s):  Right total hip arthroplasty       Anesthesia Type:  General    Note:  Disposition: Inpatient   Postop Pain Control:    PONV: No   Neuro/Psych: Uneventful            Sign Out: Acceptable/Baseline neuro status   Airway/Respiratory: Uneventful            Sign Out: Acceptable/Baseline resp. status   CV/Hemodynamics: Uneventful            Sign Out: Acceptable CV status; No obvious hypovolemia; No obvious fluid overload   Other NRE:    DID A NON-ROUTINE EVENT OCCUR? No           Last vitals:  Vitals Value Taken Time   /93 04/14/25 1205   Temp 89.24  F (31.8  C) 04/14/25 1130   Pulse 98 04/14/25 1215   Resp 15 04/14/25 1215   SpO2 98 % 04/14/25 1215   Vitals shown include unfiled device data.    Electronically Signed By: Asha Malik MD  April 14, 2025  1:26 PM

## 2025-04-14 NOTE — CONSULTS
[facility]  Consult Note - Hospitalist Service  Date of Admission:  4/14/2025  Consult Requested by: Orthopedics  Reason for Consult: Assistance with post-op medical management    Assessment & Plan   Kimo Greenwood is a 44 year old male with HTN and LOREN who underwent R MARGO 4/14/25.  No major complications noted.      Right MARGO 4/14/25:  -Doing well immediately post-op.  He felt pain controlled.  EBL was 250 ml.    -Post-op site cares, activity restrictions, pain medications, DVT proph per surgical team.  -Check hgb in AM    LOREN:  -Brought home CPAP, will order for here    Obesity:  -Complicates care  -Hold GLP-1 tx while hospitalized    HTN:  -Hold hydrochlorothiazide until BP trends reassessed for tomorrow.  Likely can resume tomorrow.  -Hold prazosin  -Also on lisinopril 10 mg daily as note recent clinic plan.  Will resume here with parameters.  I note it is not on his brief preop list.  Will ask pharmacy to reconcile meds.    Incontinence, prostate/bladder issues:  -Continue PTA mirabegron and prazosin.    -Hold oxybutynin initially.  It appears duplicate therapy with the mirabegron.  I note an outpatient 3/21 note recommending he talk to his urologist about whether this should continue with the other medication.    Anxiety/Depression:  -Continue olanzapine.  He reports combo Lybalvi was stopped by his outpatient providers ahead of surgery and he was switched to just the olanzapine portion.  (See 3/21/25 clinic note) I will continue that here.  -Continue duloxetine    Patel's/Reflux:  -Resume PPI    CKD Stage II-III:  -Recheck creat in AM, avoid extra nephrotoxic drugs.    Chronic loose stools:  -Monitor, uses intermittent fiber outpatient    Asthma per some records:  -No recent acute pulm complaints, baseline not on scheduled meds    Marijuana outpatient use:  -Monitor       Clinically Significant Risk Factors Present on Admission                   # Hypertension: Noted on problem  "list           # Severe Obesity: Estimated body mass index is 43.69 kg/m  as calculated from the following:    Height as of this encounter: 1.829 m (6' 0.01\").    Weight as of this encounter: 146.1 kg (322 lb 3.2 oz).       # Asthma: noted on problem list        Fer Browne DO  Hospitalist Service  Securely message with [facility] (more info)  Text page via [employer] Paging/Directory   ______________________________________________________________________    Chief Complaint   Surgery    History is obtained from the patient    History of Present Illness   Kimo Greenwood is a 44 year old male who underwent right MARGO as noted above.  No major complications noted.  Feels well immediately post-op.  Denies any major pain.  No chest pain, sob, nausea.  Tolerating initial intake.       Past Medical History    Past Medical History:   Diagnosis Date    Arthritis     DDD hips and knees    Asthma     Asthma     Patel's esophagus     Chronic pain     Chronic pain - left hip/leg pain     degenerative disc disease, back, hips, knees    Gastroesophageal reflux disease     History of anesthesia complications     agitation x1 after interstim 2013    Hypertension     Hypertension     Leukocytosis     LPRD (laryngopharyngeal reflux disease)     Marijuana abuse     Marijuana abuse     MDD (major depressive disorder)     MDD (major depressive disorder)     Mental retardation, mild (I.Q. 50-70)     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    With associated speech/language delay    Obesity 11/11/2010    LOREN (obstructive sleep apnea)     Uses a CPAP    LOREN (obstructive sleep apnea)     Seborrheic dermatitis     Seborrheic dermatitis     Sleep apnea     CPAP       Past Surgical History   Past Surgical History:   Procedure Laterality Date    ARTHROPLASTY HIP Left 1/25/2017    Procedure: ARTHROPLASTY HIP;  Surgeon: Bala Randall MD;  Location: [facility] OR    ARTHROPLASTY HIP Left     ARTHROPLASTY REVISION HIP Left 6/24/2019    " Procedure: Revision left total hip arthroplasty with a head and liner exchange to a Biomet dual mobility head and liner.;  Surgeon: Bala Randall MD;  Location: [facility] OR    BLADDER SURGERY      Ibarra, [facility],Interstem stimulator implant    CLOSED REDUCTION HIP Left 6/10/2019    Procedure: Closed reduction under general anesthesia left recurrent prosthetic hip dislocation;  Surgeon: Rafat Cazares MD;  Location: [facility] OR    COLONOSCOPY N/A 10/30/2015    Procedure: COMBINED COLONOSCOPY, SINGLE OR MULTIPLE BIOPSY/POLYPECTOMY BY BIOPSY;  Surgeon: Alexis Jackson MD;  Location: [facility] GI    COLONOSCOPY N/A 11/17/2016    Procedure: COMBINED COLONOSCOPY, SINGLE OR MULTIPLE BIOPSY/POLYPECTOMY BY BIOPSY;  Surgeon: Cat Huber MD;  Location: [facility]    COLONOSCOPY N/A 10/28/2020    Procedure: COLONOSCOPY;  Surgeon: You Kraus MD;  Location: [facility] OR    COLONOSCOPY      ESOPHAGOSCOPY, GASTROSCOPY, DUODENOSCOPY (EGD), COMBINED N/A 11/17/2016    Procedure: COMBINED ESOPHAGOSCOPY, GASTROSCOPY, DUODENOSCOPY (EGD), BIOPSY SINGLE OR MULTIPLE;  Surgeon: Cat Huber MD;  Location:  GI    ESOPHAGOSCOPY, GASTROSCOPY, DUODENOSCOPY (EGD), COMBINED N/A 3/12/2020    Procedure: ESOPHAGOGASTRODUODENOSCOPY WITH BIOPSIES;  Surgeon: You Kraus MD;  Location: [facility] OR    ESOPHAGOSCOPY, GASTROSCOPY, DUODENOSCOPY (EGD), COMBINED N/A 10/28/2020    Procedure: ESOPHAGOGASTRODUODENOSCOPY, WITH BIOPSY;  Surgeon: You Kraus MD;  Location: [facility] OR    ESOPHAGOSCOPY, GASTROSCOPY, DUODENOSCOPY (EGD), COMBINED      ESOPHAGOSCOPY, GASTROSCOPY, DUODENOSCOPY (EGD), COMBINED N/A 11/29/2023    Procedure: Esophagoscopy, gastroscopy, duodenoscopy (EGD), combined with biopsies using forceps;  Surgeon: You Kraus MD;  Location:  GI    EXAM UNDER ANESTHESIA, FULGURATE CONDYLOMA ANUS, COMBINED N/A 12/22/2016    Procedure: COMBINED EXAM UNDER ANESTHESIA, FULGURATE CONDYLOMA ANUS;  Surgeon:  Nya Cabello MD;  Location: [facility]    GENITOURINARY SURGERY      JOINT REPLACEMENT Left 2017    MARGO, Revision Left MARGO    OTHER SURGICAL HISTORY      interstim stimulator implant    ME CYSTOURETHROSCOPY INJ CHEMODENERVATION BLADDER N/A 1/16/2019    Procedure: CYSTOSCOPY BOTOX BLADDER INJECTIONS (100 UNITS IN 10CC);  Surgeon: Didier Ibarra MD;  Location: [facility];  Service: Urology    ME IMPLANT PERIPH/GASTRIC NEUROSTIM/ N/A 1/8/2020    Procedure: NEW INTERSTIM LEAD, REPLACE OLD; NEW INTERSTIM BATTERY, REPLACE OLD;  Surgeon: Didier Ibarra MD;  Location: [facility];  Service: Urology       Medications   I have reviewed this patient's current medications          Physical Exam   Vital Signs: Temp: 96.8  F (36  C) Temp src: Temporal BP: (!) 144/78 Pulse: 90   Resp: 12 SpO2: 96 % O2 Device: Nasal cannula Oxygen Delivery: 1 LPM  Weight: 322 lbs 3.2 oz    GEN:  Alert, oriented x 3, appears comfortable, NAD.  HEENT:  Normocephalic/atraumatic, no scleral icterus, no nasal discharge, mouth moist.  CV:  Regular rate and rhythm, no loud murmur or rub.  LUNGS:  Clear to auscultation bilaterally without rales/rhonchi/wheezing/retractions.  Symmetric chest rise on inhalation noted.  ABD:  Active bowel sounds, soft, obese, non-tender/non-distended.  No rebound/guarding/rigidity.  EXT:  Trace LE edema.  No cyanosis.  Detailed surgical site exam deferred to orthopedics.  Sensation to touch feet grossly intact, moving feet on request.  SKIN:  Dry to touch, no exanthems noted in the visualized areas.      Medical Decision Making       50 MINUTES SPENT BY ME on the date of service doing chart review, history, exam, documentation & further activities per the note.      Data         Imaging results reviewed over the past 24 hrs:   Recent Results (from the past 24 hours)   XR Hip Port Right 1 View    Narrative    EXAM: XR HIP PORT RIGHT 1 VIEW  LOCATION: [facility]  DATE:  4/14/2025    INDICATION: R hip surgery  COMPARISON: None.      Impression    IMPRESSION: Intraoperative AP view demonstrates right hip arthroplasty in progress with acetabular component and trial femoral component. Alignment is satisfactory.   XR Pelvis w Hip Port Right 1 View    Narrative    EXAM: XR PELVIS AND HIP PORTABLE RIGHT 1 VIEW  LOCATION: [facility]  DATE: 4/14/2025    INDICATION: Status post Hip surgery  COMPARISON: X-ray from 6/24/2019.      Impression    IMPRESSION: Right total hip arthroplasty with adjacent gas. Excellent position and alignment of components. There is no evidence of complication or significant periprosthetic fracture. [Left total hip arthroplasty incidentally noted.

## 2025-04-14 NOTE — ANESTHESIA CARE TRANSFER NOTE
Patient: Kimo Greenwood    Procedure: Procedure(s):  Right total hip arthroplasty       Diagnosis: Osteoarthritis of hip [M16.9]  Diagnosis Additional Information: No value filed.    Anesthesia Type:   General     Note:    Oropharynx: oropharynx clear of all foreign objects and spontaneously breathing  Level of Consciousness: awake  Oxygen Supplementation: face mask  Level of Supplemental Oxygen (L/min / FiO2): 8l  Independent Airway: airway patency satisfactory and stable  Dentition: dentition unchanged  Vital Signs Stable: post-procedure vital signs reviewed and stable  Report to RN Given: handoff report given  Patient transferred to: PACU  Comments: Pt to PACU, VSS, report to RN  Handoff Report: Identifed the Patient, Identified the Reponsible Provider, Reviewed the pertinent medical history, Discussed the surgical course, Reviewed Intra-OP anesthesia mangement and issues during anesthesia, Set expectations for post-procedure period and Allowed opportunity for questions and acknowledgement of understanding  Vitals:  Vitals Value Taken Time   /87 04/14/25 1005   Temp 96.98  F (36.1  C) 04/14/25 1008   Pulse 98 04/14/25 1008   Resp 17 04/14/25 1008   SpO2 93 % 04/14/25 1008   Vitals shown include unfiled device data.    Electronically Signed By: LAMBERT Wong CRNA  April 14, 2025  10:09 AM

## 2025-04-15 ENCOUNTER — APPOINTMENT (OUTPATIENT)
Dept: PHYSICAL THERAPY | Facility: CLINIC | Age: 45
End: 2025-04-15
Attending: ORTHOPAEDIC SURGERY
Payer: COMMERCIAL

## 2025-04-15 VITALS
WEIGHT: 315 LBS | SYSTOLIC BLOOD PRESSURE: 96 MMHG | TEMPERATURE: 97 F | BODY MASS INDEX: 42.66 KG/M2 | DIASTOLIC BLOOD PRESSURE: 52 MMHG | HEIGHT: 72 IN | HEART RATE: 84 BPM | RESPIRATION RATE: 20 BRPM | OXYGEN SATURATION: 97 %

## 2025-04-15 LAB
ANION GAP SERPL CALCULATED.3IONS-SCNC: 12 MMOL/L (ref 7–15)
BUN SERPL-MCNC: 16.4 MG/DL (ref 6–20)
CALCIUM SERPL-MCNC: 9 MG/DL (ref 8.8–10.4)
CHLORIDE SERPL-SCNC: 97 MMOL/L (ref 98–107)
CREAT SERPL-MCNC: 1.41 MG/DL (ref 0.67–1.17)
EGFRCR SERPLBLD CKD-EPI 2021: 63 ML/MIN/1.73M2
GLUCOSE SERPL-MCNC: 132 MG/DL (ref 70–99)
HCO3 SERPL-SCNC: 27 MMOL/L (ref 22–29)
HGB BLD-MCNC: 10.4 G/DL (ref 13.3–17.7)
POTASSIUM SERPL-SCNC: 3 MMOL/L (ref 3.4–5.3)
POTASSIUM SERPL-SCNC: 3.1 MMOL/L (ref 3.4–5.3)
POTASSIUM SERPL-SCNC: 3.3 MMOL/L (ref 3.4–5.3)
SODIUM SERPL-SCNC: 136 MMOL/L (ref 135–145)

## 2025-04-15 PROCEDURE — 250N000013 HC RX MED GY IP 250 OP 250 PS 637: Performed by: INTERNAL MEDICINE

## 2025-04-15 PROCEDURE — 97535 SELF CARE MNGMENT TRAINING: CPT | Mod: GO

## 2025-04-15 PROCEDURE — 250N000011 HC RX IP 250 OP 636: Performed by: ORTHOPAEDIC SURGERY

## 2025-04-15 PROCEDURE — 97165 OT EVAL LOW COMPLEX 30 MIN: CPT | Mod: GO

## 2025-04-15 PROCEDURE — 85018 HEMOGLOBIN: CPT | Performed by: ORTHOPAEDIC SURGERY

## 2025-04-15 PROCEDURE — 97530 THERAPEUTIC ACTIVITIES: CPT | Mod: GP

## 2025-04-15 PROCEDURE — 97116 GAIT TRAINING THERAPY: CPT | Mod: GP

## 2025-04-15 PROCEDURE — 80048 BASIC METABOLIC PNL TOTAL CA: CPT | Performed by: INTERNAL MEDICINE

## 2025-04-15 PROCEDURE — 36415 COLL VENOUS BLD VENIPUNCTURE: CPT | Performed by: ORTHOPAEDIC SURGERY

## 2025-04-15 PROCEDURE — 36415 COLL VENOUS BLD VENIPUNCTURE: CPT | Performed by: INTERNAL MEDICINE

## 2025-04-15 PROCEDURE — 250N000013 HC RX MED GY IP 250 OP 250 PS 637: Performed by: ORTHOPAEDIC SURGERY

## 2025-04-15 PROCEDURE — 84132 ASSAY OF SERUM POTASSIUM: CPT | Performed by: INTERNAL MEDICINE

## 2025-04-15 RX ORDER — POTASSIUM CHLORIDE 1500 MG/1
20 TABLET, EXTENDED RELEASE ORAL ONCE
Status: COMPLETED | OUTPATIENT
Start: 2025-04-15 | End: 2025-04-15

## 2025-04-15 RX ORDER — POTASSIUM CHLORIDE 1500 MG/1
40 TABLET, EXTENDED RELEASE ORAL ONCE
Status: COMPLETED | OUTPATIENT
Start: 2025-04-15 | End: 2025-04-15

## 2025-04-15 RX ADMIN — MAGNESIUM HYDROXIDE 30 ML: 2400 SUSPENSION ORAL at 05:15

## 2025-04-15 RX ADMIN — ACETAMINOPHEN 975 MG: 325 TABLET, FILM COATED ORAL at 05:11

## 2025-04-15 RX ADMIN — LISINOPRIL 10 MG: 10 TABLET ORAL at 08:07

## 2025-04-15 RX ADMIN — POTASSIUM CHLORIDE 20 MEQ: 20 TABLET, EXTENDED RELEASE ORAL at 13:29

## 2025-04-15 RX ADMIN — SENNOSIDES AND DOCUSATE SODIUM 1 TABLET: 50; 8.6 TABLET ORAL at 10:24

## 2025-04-15 RX ADMIN — FAMOTIDINE 20 MG: 20 TABLET, FILM COATED ORAL at 08:07

## 2025-04-15 RX ADMIN — POTASSIUM CHLORIDE 40 MEQ: 20 TABLET, EXTENDED RELEASE ORAL at 16:07

## 2025-04-15 RX ADMIN — CEFAZOLIN SODIUM 2 G: 2 SOLUTION INTRAVENOUS at 00:32

## 2025-04-15 RX ADMIN — ACETAMINOPHEN 975 MG: 325 TABLET, FILM COATED ORAL at 13:29

## 2025-04-15 RX ADMIN — PANTOPRAZOLE SODIUM 40 MG: 40 TABLET, DELAYED RELEASE ORAL at 08:07

## 2025-04-15 RX ADMIN — FERROUS SULFATE TAB 325 MG (65 MG ELEMENTAL FE) 325 MG: 325 (65 FE) TAB at 08:07

## 2025-04-15 RX ADMIN — OXYCODONE HYDROCHLORIDE 10 MG: 10 TABLET ORAL at 05:11

## 2025-04-15 RX ADMIN — POTASSIUM CHLORIDE 40 MEQ: 20 TABLET, EXTENDED RELEASE ORAL at 11:15

## 2025-04-15 RX ADMIN — HYDROXYZINE HYDROCHLORIDE 25 MG: 25 TABLET, FILM COATED ORAL at 00:38

## 2025-04-15 RX ADMIN — ASPIRIN 325 MG: 325 TABLET, COATED ORAL at 08:07

## 2025-04-15 RX ADMIN — MIRABEGRON 50 MG: 50 TABLET, EXTENDED RELEASE ORAL at 09:45

## 2025-04-15 RX ADMIN — POLYETHYLENE GLYCOL 3350 17 G: 17 POWDER, FOR SOLUTION ORAL at 10:23

## 2025-04-15 RX ADMIN — DULOXETINE 120 MG: 30 CAPSULE, DELAYED RELEASE ORAL at 08:07

## 2025-04-15 RX ADMIN — OXYCODONE HYDROCHLORIDE 10 MG: 10 TABLET ORAL at 16:58

## 2025-04-15 ASSESSMENT — ACTIVITIES OF DAILY LIVING (ADL)
ADLS_ACUITY_SCORE: 38
ADLS_ACUITY_SCORE: 37
ADLS_ACUITY_SCORE: 38
ADLS_ACUITY_SCORE: 37
ADLS_ACUITY_SCORE: 37
ADLS_ACUITY_SCORE: 38
ADLS_ACUITY_SCORE: 37

## 2025-04-15 NOTE — PLAN OF CARE
Occupational Therapy Discharge Summary    Reason for therapy discharge:    All goals and outcomes met, no further needs identified.    Progress towards therapy goal(s). See goals on Care Plan in [facility] electronic health record for goal details.  Goals met    Therapy recommendation(s):    Defer to ortho team.

## 2025-04-15 NOTE — PLAN OF CARE
Goal Outcome Evaluation:      Plan of Care Reviewed With: patient    Overall Patient Progress: improvingOverall Patient Progress: improving     A&OX4, RA, VSS. K+ 3.0- protocol started, replaced. Redraw 3.1- orders to replace, redraw at 1700. Fair appetite. Patient became angry and ripped IV out when discharge plans changed due to potassium replacement. X1 walker GB. Pain treated with scheduled tylenol.       Problem: Adult Inpatient Plan of Care  Goal: Plan of Care Review  Outcome: Progressing  Flowsheets (Taken 4/15/2025 1455)  Plan of Care Reviewed With: patient  Overall Patient Progress: improving  Goal: Patient-Specific Goal (Individualized)  Outcome: Progressing  Goal: Absence of Hospital-Acquired Illness or Injury  Outcome: Progressing  Intervention: Identify and Manage Fall Risk  Recent Flowsheet Documentation  Taken 4/15/2025 1126 by Kym Swann RN  Safety Promotion/Fall Prevention:   activity supervised   assistive device/personal items within reach   safety round/check completed   room organization consistent  Intervention: Prevent and Manage VTE (Venous Thromboembolism) Risk  Recent Flowsheet Documentation  Taken 4/15/2025 1126 by Kym Swann RN  VTE Prevention/Management: SCDs off (sequential compression devices)  Intervention: Prevent Infection  Recent Flowsheet Documentation  Taken 4/15/2025 1126 by Kym Swann RN  Infection Prevention:   hand hygiene promoted   rest/sleep promoted  Goal: Optimal Comfort and Wellbeing  Outcome: Progressing  Goal: Readiness for Transition of Care  Outcome: Progressing     Problem: Skin Injury Risk Increased  Goal: Skin Health and Integrity  Outcome: Progressing  Intervention: Plan: Nurse Driven Intervention: Moisture Management  Recent Flowsheet Documentation  Taken 4/15/2025 1126 by Kym Swann RN  Moisture Interventions:   Encourage regular toileting   Incontinence pad  Intervention: Plan: Nurse Driven Intervention: Friction and Shear  Recent  Flowsheet Documentation  Taken 4/15/2025 1126 by Kym Swann RN  Friction/Shear Interventions: HOB 30 degrees or less  Intervention: Optimize Skin Protection  Recent Flowsheet Documentation  Taken 4/15/2025 1126 by Kym Swann RN  Activity Management:   activity adjusted per tolerance   activity encouraged     Problem: Pain Acute  Goal: Optimal Pain Control and Function  Outcome: Progressing     Problem: Hip Arthroplasty  Goal: Optimal Coping  Outcome: Progressing  Goal: Absence of Bleeding  Outcome: Progressing  Goal: Effective Bowel Elimination  Outcome: Progressing  Goal: Fluid and Electrolyte Balance  Outcome: Progressing  Goal: Optimal Functional Ability  Outcome: Progressing  Intervention: Promote Optimal Functional Status  Recent Flowsheet Documentation  Taken 4/15/2025 1126 by Kym Swann RN  Activity Management:   activity adjusted per tolerance   activity encouraged  Goal: Absence of Infection Signs and Symptoms  Outcome: Progressing  Goal: Intact Neurovascular Status  Outcome: Progressing  Goal: Anesthesia/Sedation Recovery  Outcome: Progressing  Intervention: Optimize Anesthesia Recovery  Recent Flowsheet Documentation  Taken 4/15/2025 1126 by Kym Swann RN  Safety Promotion/Fall Prevention:   activity supervised   assistive device/personal items within reach   safety round/check completed   room organization consistent  Goal: Optimal Pain Control and Function  Outcome: Progressing  Goal: Nausea and Vomiting Relief  Outcome: Progressing  Intervention: Prevent or Manage Nausea and Vomiting  Recent Flowsheet Documentation  Taken 4/15/2025 1126 by Kym Swann RN  Nausea/Vomiting Interventions: (denies) other (see comments)  Goal: Effective Urinary Elimination  Outcome: Progressing  Goal: Effective Oxygenation and Ventilation  Outcome: Progressing  Intervention: Optimize Oxygenation and Ventilation  Recent Flowsheet Documentation  Taken 4/15/2025 1126 by Kym Swann RN  Cough  And Deep Breathing: done independently per patient  Activity Management:   activity adjusted per tolerance   activity encouraged

## 2025-04-15 NOTE — PLAN OF CARE
Physical Therapy Discharge Summary    Reason for therapy discharge:    All goals and outcomes met, no further needs identified.    Progress towards therapy goal(s). See goals on Care Plan in [facility] electronic health record for goal details.  Goals met    Therapy recommendation(s):    Defer to ortho.

## 2025-04-15 NOTE — PLAN OF CARE
Patient vital signs are at baseline: Yes  Patient able to ambulate as they were prior to admission or with assist devices provided by therapies during their stay:  Yes  Patient MUST void prior to discharge:  Yes  Patient able to tolerate oral intake:  Yes  Pain has adequate pain control using Oral analgesics:  Yes  Does patient have an identified :  Yes  Has goal D/C date and time been discussed with patient:  Yes    Pt is alert and oriented x4, SBA with transfers. Voiding. Pain controlled with prn Oxycodone. Dressing cdi. On RA, SL. Pt requested intervention for bowel movement, prn MOM given. Plan to discharge home today with family.          Goal Outcome Evaluation:      Plan of Care Reviewed With: patient    Overall Patient Progress: improvingOverall Patient Progress: improving    Outcome Evaluation: Plan to d/c home today with family

## 2025-04-15 NOTE — PROGRESS NOTES
[facility]    Hospitalist Progress Note  Name: Kimo Greenwood    MRN: [id number]  Provider: Dulce Rees MD  Date of Service: 04/15/2025    Assessment & Plan   Summary of Stay: Kimo Greenwood is a 44 year old male who was admitted on 4/14/2025 for right total hip arthroplasty.  Patient with past medical significant for hypertension and obstructive sleep apnea.    During hospitalization patient's blood pressure medications were held due to lower to normal blood pressures.  His diuretics were held.  Also patient is noted to have hypokalemia.    Postop care status post right hip arthroplasty on 4/14/2025   Postop day #1  -Plan per Ortho  - Discharge recommendations per Ortho  - Pain control anticoagulation per Ortho    Hypokalemia  - Ordered replacement  - Check potassium prior to discharge  - Patient will need follow-up with primary care for repeat check of electrolytes    Hypertension  - Patient's blood pressure has been within normal limits during hospitalization we will continue to hold blood pressure medication  - Patient will keep record of his blood pressure and follow-up with primary care provider in 3 to 5 days  - Need to resume blood pressure medications as appropriate.  - Continue lisinopril and holding hydrochlorothiazide    Acute blood loss anemia  - Postop hemoglobin down to 10.4  - Follow-up as outpatient  - No signs of acute bleeding    Constipation  - Bowel regimen per primary team    Sleep apnea  - Continue CPAP    Obesity  - Prior to admission meds on discharge follow with primary care provider    Incontinence, prostate/bladder issues:  -Continue PTA mirabegron and prazosin.    -Hold oxybutynin initially.  It appears duplicate therapy with the mirabegron.  I note an outpatient 3/21 note recommending he talk to his urologist about whether this should continue with the other medication.     Anxiety/Depression:  -Continue olanzapine.  He reports combo Lybalvi was stopped by his  outpatient providers ahead of surgery and he was switched to just the olanzapine portion.  (See 3/21/25 clinic note) I will continue that here.  -Continue duloxetine     Patel's/Reflux:  -Resume PPI     CKD Stage II-III:  -Recheck creat in AM, avoid extra nephrotoxic drugs.     Chronic loose stools:  -Monitor, uses intermittent fiber outpatient     Asthma per some records:  -No recent acute pulm complaints, baseline not on scheduled meds     Marijuana outpatient use:  -Monitor       DVT Prophylaxis: Defer to primary service  Code Status: Full Code    Disposition:   Medically Ready for Discharge: Anticipated Today per primary team.  However patient needs potassium replacement.  Will need to schedule follow-up with primary care provider in 3 to 5 days.  Med changes will need to be addressed and reintroduced as appropriate  Patient also complaining of constipation receiving laxatives will need to follow-up.      Interval History   Assumed care reviewed chart.  Patient complains of constipation.  Noted low potassium.  Not requiring any supplemental oxygen.  Pain is well-controlled.  More than 10 point review of systems was carried out was otherwise negative.  Total time spent in direct patient care and coronation of care is more than 35 minutes      -Data reviewed today: I reviewed all new labs and imaging reports over the last 24 hours. I personally reviewed no images or EKG's today.    Physical Exam   Temp: 96.8  F (36  C) Temp src: Temporal BP: 128/52 Pulse: 83   Resp: 18 SpO2: 98 % O2 Device: None (Room air) Oxygen Delivery: 1 LPM  Vitals:    04/14/25 0538 04/15/25 1040   Weight: (!) 146.1 kg (322 lb 3.2 oz) (!) 151.4 kg (333 lb 11.2 oz)     Vital Signs with Ranges  Temp:  [96.2  F (35.7  C)-98  F (36.7  C)] 96.8  F (36  C)  Pulse:  [77-97] 83  Resp:  [10-20] 18  BP: (117-154)/(52-98) 128/52  SpO2:  [91 %-100 %] 98 %  I/O last 3 completed shifts:  In: 2925 [I.V.:2925]  Out: 745 [Urine:495; Blood:250]    GEN:   Alert, oriented x 3, appears comfortable, NAD.  HEENT:  Normocephalic/atraumatic, no scleral icterus, no nasal discharge, mouth moist.  CV:  Regular rate and rhythm, no murmur or JVD.  S1 + S2 noted, no S3 or S4.  LUNGS:  Clear to auscultation bilaterally without rales/rhonchi/wheezing/retractions.  Symmetric chest rise on inhalation noted.  ABD:  Active bowel sounds, soft, non-tender/non-distended.  No rebound/guarding/rigidity.  EXT: Trace edema   SKIN:  Dry to touch, no exanthems noted in the visualized areas.    Medications   Current Facility-Administered Medications   Medication Dose Route Frequency Provider Last Rate Last Admin    lactated ringers infusion   Intravenous Continuous Bala Randall MD   Stopped at 04/15/25 0512     Current Facility-Administered Medications   Medication Dose Route Frequency Provider Last Rate Last Admin    acetaminophen (TYLENOL) tablet 975 mg  975 mg Oral Q8H Bala Randall MD   975 mg at 04/15/25 0511    aspirin (ASA) EC tablet 325 mg  325 mg Oral Daily Bala Randall MD   325 mg at 04/15/25 0807    DULoxetine (CYMBALTA) DR capsule 120 mg  120 mg Oral Daily Fer Browne, DO   120 mg at 04/15/25 0807    famotidine (PEPCID) tablet 20 mg  20 mg Oral BID Bala Randall MD   20 mg at 04/15/25 0807    Or    famotidine (PEPCID) injection 20 mg  20 mg Intravenous BID Bala Randall MD        ferrous sulfate (FEROSUL) tablet 325 mg  325 mg Oral Daily with breakfast Fer Browne P, DO   325 mg at 04/15/25 0807    [Held by provider] hydrochlorothiazide (HYDRODIURIL) tablet 25 mg  25 mg Oral Daily Fer Browne P, DO        lisinopril (ZESTRIL) tablet 10 mg  10 mg Oral Daily Fer Browne P, DO   10 mg at 04/15/25 0807    mirabegron (MYRBETRIQ) 24 hr tablet 50 mg  50 mg Oral Daily Fer Browne P, DO   50 mg at 04/15/25 0945    OLANZapine (zyPREXA) tablet 10 mg  10 mg Oral At Bedtime Fer Browne P, DO   10 mg at 04/14/25 2653     [Held by provider] oxyBUTYnin ER (DITROPAN XL) 24 hr tablet 30 mg  30 mg Oral Daily Fer Browne DO        pantoprazole (PROTONIX) EC tablet 40 mg  40 mg Oral QAM AC Fer Browne DO   40 mg at 04/15/25 0807    polyethylene glycol (MIRALAX) Packet 17 g  17 g Oral Daily Bala Randall MD   17 g at 04/15/25 1023    potassium chloride tay ER (KLOR-CON M20) CR tablet 20 mEq  20 mEq Oral Once Dulce Rees MD        [Held by provider] prazosin (MINIPRESS) capsule 3 mg  3 mg Oral At Bedtime Fer Browne DO        senna-docusate (SENOKOT-S/PERICOLACE) 8.6-50 MG per tablet 1 tablet  1 tablet Oral BID Bala Randall MD   1 tablet at 04/15/25 1024    sodium chloride (PF) 0.9% PF flush 3 mL  3 mL Intracatheter Q8H Bala Randall MD   3 mL at 04/15/25 0515     Data     Recent Labs   Lab 04/15/25  0732   HGB 10.4*     Recent Labs   Lab 04/15/25  0732      POTASSIUM 3.0*   CHLORIDE 97*   CO2 27   ANIONGAP 12   *   BUN 16.4   CR 1.41*   GFRESTIMATED 63   FILEMON 9.0     7-Day Micro Results       No results found for the last 168 hours.            Recent Results (from the past 24 hours)   XR Pelvis w Hip Port Right 1 View    Narrative    EXAM: XR PELVIS AND HIP PORTABLE RIGHT 1 VIEW  LOCATION: [facility]  DATE: 4/14/2025    INDICATION: Status post Hip surgery  COMPARISON: X-ray from 6/24/2019.      Impression    IMPRESSION: Right total hip arthroplasty with adjacent gas. Excellent position and alignment of components. There is no evidence of complication or significant periprosthetic fracture. [Left total hip arthroplasty incidentally noted.

## 2025-04-15 NOTE — PROGRESS NOTES
04/15/25 1000   Appointment Info   Signing Clinician's Name / Credentials (OT) Corazon Bhatt OTD, OTR/L   Rehab Comments (OT) WBAT RLE   Quick Adds   Quick Adds Certification   Living Environment   Living Environment Comments Pt reports living in apt, 5 stairs to manage with R railing. Use of SEC at baseline. Will be recovering at parents apt until Saturday, no stairs to manage there.   Self-Care   Usual Activity Tolerance moderate   Current Activity Tolerance moderate   Equipment Currently Used at Home cane, straight;grab bar, toilet;grab bar, tub/shower;raised toilet seat;tub bench;walker, rolling   Fall history within last six months no   Activity/Exercise/Self-Care Comment Pt reports being IND at baseline with all ADLS   Instrumental Activities of Daily Living (IADL)   IADL Comments Pt reports being IND at baseline with most IADLs.   General Information   Onset of Illness/Injury or Date of Surgery 04/14/25   Referring Physician Bala Randall MD   Patient/Family Therapy Goal Statement (OT) Return home and be ind.   Additional Occupational Profile Info/Pertinent History of Current Problem s/p R MARGO   Existing Precautions/Restrictions fall   Right Lower Extremity (Weight-bearing Status) weight-bearing as tolerated (WBAT)   Cognitive Status Examination   Orientation Status orientation to person, place and time   Visual Perception   Visual Impairment/Limitations WFL   Sensory   Sensory Quick Adds sensation intact   Pain Assessment   Patient Currently in Pain Yes, see Vital Sign flowsheet   Posture   Posture forward head position;protracted shoulders   Range of Motion Comprehensive   General Range of Motion no range of motion deficits identified   Strength Comprehensive (MMT)   General Manual Muscle Testing (MMT) Assessment no strength deficits identified   Comment, General Manual Muscle Testing (MMT) Assessment RLE impaired s/p surgical intervention   Coordination   Upper Extremity Coordination No  deficits were identified   Bed Mobility   Comment (Bed Mobility) Pt in chair at eval-defer to PT   Transfers   Transfer Comments SBA   Balance   Balance Comments Steady with FWW   Activities of Daily Living   BADL Assessment/Intervention bathing;lower body dressing;toileting   Bathing Assessment/Intervention   Comment, (Bathing) SBA   Lower Body Dressing Assessment/Training   Comment, (Lower Body Dressing) Mod A without AE   Toileting   Comment, (Toileting) SBA and FWW   Clinical Impression   Criteria for Skilled Therapeutic Interventions Met (OT) Yes, treatment indicated   OT Diagnosis Impaired ADLS   Influenced by the following impairments s/p R MARGO   OT Problem List-Impairments impacting ADL problems related to;activity tolerance impaired;mobility;strength;pain   Assessment of Occupational Performance 1-3 Performance Deficits   Identified Performance Deficits LB dressing, bathing, home mgmt   Planned Therapy Interventions (OT) ADL retraining;strengthening;transfer training;progressive activity/exercise   Clinical Decision Making Complexity (OT) problem focused assessment/low complexity   Risk & Benefits of therapy have been explained evaluation/treatment results reviewed;care plan/treatment goals reviewed;risks/benefits reviewed;current/potential barriers reviewed;participants voiced agreement with care plan;participants included;patient   OT Total Evaluation Time   OT Eval, Low Complexity Minutes (98964) 5   Therapy Certification   Medical Diagnosis s/p R MARGO   Start of Care Date 04/15/25   Certification date from 04/15/25   Certification date to 04/15/25   OT Goals   Therapy Frequency (OT) One time eval and treatment   OT Predicted Duration/Target Date for Goal Attainment 04/15/25   OT Goals Hygiene/Grooming;Lower Body Dressing;Lower Body Bathing;Toilet Transfer/Toileting   OT: Hygiene/Grooming modified independent;Goal Met   OT: Lower Body Dressing Modified independent;using adaptive equipment;Goal Met   OT:  Lower Body Bathing Supervision/stand-by assist;Goal Met   OT: Toilet Transfer/Toileting Modified independent;toilet transfer;cleaning and garment management;using adaptive equipment;Goal Met   Interventions   Interventions Quick Adds Self-Care/Home Management   Self-Care/Home Management   Self-Care/Home Mgmt/ADL, Compensatory, Meal Prep Minutes (09104) 23   Symptoms Noted During/After Treatment (Meal Preparation/Planning Training) increased pain   Treatment Detail/Skilled Intervention Pt greeted seated in chair, agreeable to session. Pt with no hip precautions, educated on AE/DME to increase safety and independence at home. Including how and where  to purchase items from including medical equipment [employer] vs [employer]. Pt receptive to education. Able to complete txs from toilet, chair with SBA and FWW progressing to mod I. Able to ambulate functinoal househodl distance ~100 ft with mod I overall steady with FWW. Educated pt on tub transfer technique with WBAT precautions. Pt receptive but declined trial at this session. Reports no concerns. While seated pt particiapted in FB dressing with mod A for socks due to decreased reaching. Edu on sock aid and trialed progressed to mod I for dressing. All questions and concerns addressed, no further concerns at this time.   OT Discharge Planning   OT Plan D/C   OT Discharge Recommendation (DC Rec) other (see comments)  (defer to ortho)   OT Rationale for DC Rec Defer to ortho   OT Brief overview of current status mod I ADLs   OT Total Distance Amb During Session (feet) 100   [facility]                                                                                   OUTPATIENT OCCUPATIONAL THERAPY    PLAN OF TREATMENT FOR OUTPATIENT REHABILITATION   Patient's Last Name, First Name, Kimo Diez YOB: 1980   Provider's Name   [facility]   Medical Record No.  [id number]     Onset Date:  04/14/25 Start of Care Date: (P) 04/15/25     Medical Diagnosis:  (P) s/p R MARGO               OT Diagnosis:  (P) Impaired ADLS Certification Dates:  From: (P) 04/15/25  To: (P) 04/15/25     See note for plan of treatment, functional goals, and certification details.    I CERTIFY THE NEED FOR THESE SERVICES FURNISHED UNDER        THIS PLAN OF TREATMENT AND WHILE UNDER MY CARE (Physician co-signature of this document indicates review and certification of the therapy plan).

## 2025-04-15 NOTE — PROGRESS NOTES
"Orthopedic Surgery  4/15/2025  POD#: 1    S: Patient voices no complaints today. Pain managed appropriately. Resting comfortably in bed.    O: Blood pressure 135/62, pulse 77, temperature 97  F (36.1  C), temperature source Temporal, resp. rate 20, height 1.829 m (6' 0.01\"), weight (!) 146.1 kg (322 lb 3.2 oz), SpO2 100%.  Lab Results   Component Value Date    HGB 12.8 03/26/2025           Lab Results   Component Value Date    INR 1.01 03/08/2022              I/O last 3 completed shifts:  In: 2925 [I.V.:2925]  Out: 745 [Urine:495; Blood:250]    Neurovascularly intact.  Calves are negative bilaterally, both soft and nontender.  The wound is C/D/I. Aquacel intact with no drainage.  The wound looks good with minimal erythema of the surrounding skin.    A: Mr. Greenwood is doing well status post Procedure(s):  Right total hip arthroplasty.    P:   1. Mobilize and continue physical therapy. WBAT with no hip precautions.  2. Anticoagulation - discharge on ASA  3. Pain management - controlled  4. Anticipate discharge to home later this morning after therapies.      Judy Barragan PA-C  [facility]  O: [phone]  "

## 2025-04-15 NOTE — PHARMACY-ADMISSION MEDICATION HISTORY
Pharmacist Admission Medication History    Admission medication history is complete. The information provided in this note is only as accurate as the sources available at the time of the update.    Information Source(s): Patient and CareEverywhere/SureScripts via in-person    Pertinent Information: none    Changes made to PTA medication list:  Added: olanzapine  Deleted: olanzapine-samidorphan   Changed: hydrocortisone, ketoconazole, mycology to prn    Allergies reviewed with patient and updates made in EHR: yes    Medication History Completed By: Kenneth Cedillo RPH 4/14/2025 7:39 PM    Current Facility-Administered Medications for the 4/14/25 encounter (Hospital Encounter)   Medication    cyanocobalamin injection 1,000 mcg     PTA Med List   Medication Sig Last Dose/Taking    acetaminophen (TYLENOL) 325 MG tablet Take 2 tablets (650 mg) by mouth every 4 hours as needed for other (mild pain). Taking As Needed    aspirin (ASA) 325 MG EC tablet Take 1 tablet (325 mg) by mouth 2 times daily. Taking    calcium polycarbophil (FIBER-LAX) 625 MG tablet Take 1 tablet (625 mg) by mouth daily. 4/13/2025 Morning    cephALEXin (KEFLEX) 500 MG capsule Take 1 capsule (500 mg) by mouth 4 times daily for 10 days. Taking    DULoxetine (CYMBALTA) 60 MG capsule Take 120 mg by mouth daily  4/13/2025 Morning    econazole nitrate 1 % external cream as needed. Taking As Needed    ferrous sulfate (FEROSUL) 325 (65 Fe) MG tablet Take 1 tablet (325 mg) by mouth daily (with breakfast) 4/13/2025 Morning    hydrochlorothiazide (HYDRODIURIL) 25 MG tablet TAKE 1 TABLET BY MOUTH DAILY 4/13/2025 Morning    hydrocortisone 2.5 % cream 2 times daily as needed. Taking As Needed    hydrOXYzine HCl (ATARAX) 25 MG tablet Take 1-2 tablets (25-50 mg) by mouth every 6 hours as needed for itching or anxiety (with pain, moderate pain). Taking As Needed    ibuprofen (ADVIL/MOTRIN) 600 MG tablet Take 1 tablet (600 mg) by mouth every 6 hours as needed for mild  pain. Taking As Needed    ketoconazole (NIZORAL) 2 % external cream 2 times daily as needed. Taking As Needed    mirabegron (MYRBETRIQ) 50 MG 24 hr tablet Take 1 tablet by mouth daily 4/13/2025 Morning    nystatin-triamcinolone (MYCOLOG II) 356267-3.1 UNIT/GM-% external cream Apply topically 2 times daily (Patient taking differently: Apply topically 2 times daily as needed.) Taking Differently    OLANZapine (ZYPREXA) 10 MG tablet Take 10 mg by mouth every evening. 4/13/2025 Evening    oxyBUTYnin ER (DITROPAN XL) 15 MG 24 hr tablet Take 2 tablets (30 mg) by mouth daily 4/13/2025 Morning    oxyCODONE (ROXICODONE) 5 MG tablet Take 1-2 tablets (5-10 mg) by mouth every 4 hours as needed for moderate to severe pain. Taking As Needed    pantoprazole (PROTONIX) 40 MG EC tablet TAKE 1 TABLET BY MOUTH ONCE DAILY 30-60 MINUTE(S) BEFORE FIRST MEAL OF THE DAY 4/13/2025 Morning    prazosin (MINIPRESS) 1 MG capsule Take 3 mg by mouth At Bedtime 4/13/2025 Evening    senna-docusate (SENOKOT-S/PERICOLACE) 8.6-50 MG tablet Take 1-2 tablets by mouth 2 times daily. Take while on oral narcotics to prevent or treat constipation. Taking    tirzepatide-Weight Management (ZEPBOUND) 12.5 MG/0.5ML prefilled pen Inject 0.5 mLs (12.5 mg) subcutaneously every 7 days. (Patient taking differently: Inject 12.5 mg subcutaneously every 7 days. On Friday) 4/3/2025    [DISCONTINUED] acetaminophen (TYLENOL) 500 MG tablet Take 1-2 tablets (500-1,000 mg) by mouth every 8 hours as needed for mild pain. Taking As Needed    [DISCONTINUED] ibuprofen (ADVIL/MOTRIN) 600 MG tablet Take 600 mg by mouth every 6 hours as needed for moderate pain. Taking As Needed

## 2025-04-15 NOTE — PLAN OF CARE
Patient vital signs are at baseline: Yes  Patient able to ambulate as they were prior to admission or with assist devices provided by therapies during their stay:  Yes  Patient MUST void prior to discharge:  Yes  Patient able to tolerate oral intake:  Yes  Pain has adequate pain control using Oral analgesics:  Yes  Does patient have an identified :  Yes  Has goal D/C date and time been discussed with patient:  Yes    Pt is alert and oriented x4, assist of 1 with transfers. Prn Atarax give for R hip pain, pt is voiding. Dressing cdi, denied n/t. On RA. Plan to discharge home, possible today.     Goal Outcome Evaluation:      Plan of Care Reviewed With: patient    Overall Patient Progress: improvingOverall Patient Progress: improving    Outcome Evaluation: Plan to d/c home, possible today

## 2025-04-16 NOTE — PLAN OF CARE
Reviewed discharge instructions with patient and parents. Questions answered. Patient discharged home with personal belongings, medications, and discharge paperwork via WC with parents.    Problem: Adult Inpatient Plan of Care  Goal: Absence of Hospital-Acquired Illness or Injury  Intervention: Identify and Manage Fall Risk  Recent Flowsheet Documentation  Taken 4/15/2025 1658 by Mercedes Lomax RN  Safety Promotion/Fall Prevention:   activity supervised   assistive device/personal items within reach   clutter free environment maintained   increased rounding and observation   increase visualization of patient   lighting adjusted   mobility aid in reach   nonskid shoes/slippers when out of bed   patient and family education   room organization consistent   safety round/check completed   supervised activity  Intervention: Prevent Skin Injury  Recent Flowsheet Documentation  Taken 4/15/2025 1658 by Mercedes Lomax RN  Body Position: supine, head elevated  Intervention: Prevent and Manage VTE (Venous Thromboembolism) Risk  Recent Flowsheet Documentation  Taken 4/15/2025 1658 by Mercedes Lomax RN  VTE Prevention/Management: SCDs off (sequential compression devices)  Goal: Optimal Comfort and Wellbeing  Intervention: Monitor Pain and Promote Comfort  Recent Flowsheet Documentation  Taken 4/15/2025 1740 by Mercedes Lomax RN  Pain Management Interventions:   rest   repositioned   relaxation techniques promoted  Taken 4/15/2025 1658 by Mercedes Lomax RN  Pain Management Interventions: medication (see MAR)     Problem: Skin Injury Risk Increased  Goal: Skin Health and Integrity  Intervention: Optimize Skin Protection  Recent Flowsheet Documentation  Taken 4/15/2025 1658 by Mercedes Lomax RN  Activity Management:   activity adjusted per tolerance   activity encouraged  Head of Bed (HOB) Positioning: HOB at 20-30 degrees     Problem: Pain Acute  Goal: Optimal Pain Control and Function  Intervention: Develop Pain  Management Plan  Recent Flowsheet Documentation  Taken 4/15/2025 1740 by Mercedes Lomax RN  Pain Management Interventions:   rest   repositioned   relaxation techniques promoted  Taken 4/15/2025 1658 by Mercedes Lomax RN  Pain Management Interventions: medication (see MAR)     Problem: Hip Arthroplasty  Goal: Optimal Functional Ability  Intervention: Promote Optimal Functional Status  Recent Flowsheet Documentation  Taken 4/15/2025 1658 by Mercedes Lomax RN  Assistive Device Utilized:   walker   gait belt  Activity Management:   activity adjusted per tolerance   activity encouraged  Goal: Anesthesia/Sedation Recovery  Intervention: Optimize Anesthesia Recovery  Recent Flowsheet Documentation  Taken 4/15/2025 1658 by Mercedes Lomax RN  Safety Promotion/Fall Prevention:   activity supervised   assistive device/personal items within reach   clutter free environment maintained   increased rounding and observation   increase visualization of patient   lighting adjusted   mobility aid in reach   nonskid shoes/slippers when out of bed   patient and family education   room organization consistent   safety round/check completed   supervised activity  Goal: Optimal Pain Control and Function  Intervention: Prevent or Manage Pain  Recent Flowsheet Documentation  Taken 4/15/2025 1740 by Mercedes Lomax RN  Pain Management Interventions:   rest   repositioned   relaxation techniques promoted  Taken 4/15/2025 1658 by Mercedes Lomax RN  Pain Management Interventions: medication (see MAR)  Goal: Effective Oxygenation and Ventilation  Intervention: Optimize Oxygenation and Ventilation  Recent Flowsheet Documentation  Taken 4/15/2025 1658 by Mercedes Lomax RN  Cough And Deep Breathing: done with encouragement  Activity Management:   activity adjusted per tolerance   activity encouraged  Head of Bed (HOB) Positioning: HOB at 20-30 degrees

## 2025-04-21 ENCOUNTER — LAB (OUTPATIENT)
Dept: LAB | Facility: CLINIC | Age: 45
End: 2025-04-21
Payer: COMMERCIAL

## 2025-04-21 ENCOUNTER — TELEPHONE (OUTPATIENT)
Dept: FAMILY MEDICINE | Facility: CLINIC | Age: 45
End: 2025-04-21

## 2025-04-21 DIAGNOSIS — I10 ESSENTIAL HYPERTENSION: ICD-10-CM

## 2025-04-21 DIAGNOSIS — E87.6 HYPOKALEMIA: ICD-10-CM

## 2025-04-21 PROCEDURE — 83735 ASSAY OF MAGNESIUM: CPT

## 2025-04-21 PROCEDURE — 36415 COLL VENOUS BLD VENIPUNCTURE: CPT

## 2025-04-21 PROCEDURE — 80048 BASIC METABOLIC PNL TOTAL CA: CPT

## 2025-04-21 NOTE — TELEPHONE ENCOUNTER
If orthopedics has him on aspirin, recommend only using Tylenol as needed for breakthrough pain, could use 500 to 1000 mg 3 times daily as needed.  Would not recommend ibuprofen while patient is also on aspirin.      EULALIA

## 2025-04-21 NOTE — TELEPHONE ENCOUNTER
Crys, the contracted care coordinator ([phone]) for patient calls back. Patient had been taking Oxycodone after hip replacement but he feels he no longer needs it.      Crys is asking to replace the Oxycodone with Ibuprofen/Acetaminophen 325/600 mg combination tablet and wonders if they should continue the Aspirin 325 mg every 12 hours.     How often should he take the Ibuprofen/Acetaminophen?  He had been using Oxycodone every 6 hours.  ANNIE Neumann R.N.

## 2025-04-21 NOTE — TELEPHONE ENCOUNTER
Per chart review delayed release ASA was ordered by ortho surgery for   S/P total right hip arthroplasty      On 4/14/25.     Here is hospital note:  Right total hip arthroplasty.     P:   1. Mobilize and continue physical therapy. WBAT with no hip precautions.  2. Anticoagulation - discharge on ASA  3. Pain management - controlled  4. Anticipate discharge to home later this morning after therapies.        Judy Barragan PA-C  [facility]  O: [phone]          Traci Acosta R.N.

## 2025-04-21 NOTE — TELEPHONE ENCOUNTER
Crys, contracted care coordinator for patient is calling ([phone]). Patient had a right hip replacement 1 week ago.  Last evening it was noted that patient has significant swelling of right ankle. No redness, pain, fever or injury.  Caller is not forthcoming about details of patient's condition.  Patient is elevating right leg as much as possible in bed or in recliner.  Crys asking if primary care provider will see patient today.  Call her back with any appointment information as she is the one who will bring patient to appointment.  ANNIE Neumann R.N.

## 2025-04-21 NOTE — TELEPHONE ENCOUNTER
Call back to Crys--reviewed below. I had to review a few times how to administer safely and spacing as she is setting up his medications. She reports to mehe took himself off of oxy.     Advised to try TID, when wakes, mid day, and before bed.   She is to call back if any concerns.    Traci Acosta R.N.

## 2025-04-21 NOTE — TELEPHONE ENCOUNTER
Advised needs F2F visit and if they have questions about wound care post surgery or questions about walking/weight baring should contact surgery, Dr Randall.     Crys expressed understanding and acceptance of the plan. had no further questions at this time.  Advised can call back to clinic at any time with concerns.      Eliana Concepcion RN

## 2025-04-21 NOTE — TELEPHONE ENCOUNTER
General Call    Contacts       Contact Date/Time Type Contact Phone/Fax    04/21/2025 09:43 AM CDT Phone (Incoming) Crys CARLOS (Emergency Contact)           Reason for Call:  wondering if the evisit after surgery was good enough or if he needs to be seen in person    What are your questions or concerns:  Wondering if he should have home care and somebody to check bandages, pt guardian has not changed bandages, same ones on since he left the hospital. Guardian and emergency contact don't agree on bandage changes. Please advise. Emergency contact would like to bring him in and have them changed because they don't have bandages.     Date of last appointment with provider: 4/17/25 e visit    Could we send this information to you in [email or web address] or would you prefer to receive a phone call?:   Patient would prefer a phone call   Okay to leave a detailed message?: Yes at Cell number on file:    Telephone Information:   Mobile [phone]

## 2025-04-22 ENCOUNTER — TELEPHONE (OUTPATIENT)
Dept: FAMILY MEDICINE | Facility: CLINIC | Age: 45
End: 2025-04-22
Payer: COMMERCIAL

## 2025-04-22 DIAGNOSIS — E87.6 HYPOKALEMIA: Primary | ICD-10-CM

## 2025-04-22 LAB
ANION GAP SERPL CALCULATED.3IONS-SCNC: 12 MMOL/L (ref 7–15)
BUN SERPL-MCNC: 23.5 MG/DL (ref 6–20)
CALCIUM SERPL-MCNC: 8.9 MG/DL (ref 8.8–10.4)
CHLORIDE SERPL-SCNC: 99 MMOL/L (ref 98–107)
CREAT SERPL-MCNC: 1.81 MG/DL (ref 0.67–1.17)
EGFRCR SERPLBLD CKD-EPI 2021: 47 ML/MIN/1.73M2
GLUCOSE SERPL-MCNC: 103 MG/DL (ref 70–99)
HCO3 SERPL-SCNC: 28 MMOL/L (ref 22–29)
MAGNESIUM SERPL-MCNC: 1.7 MG/DL (ref 1.7–2.3)
POTASSIUM SERPL-SCNC: 3.1 MMOL/L (ref 3.4–5.3)
SODIUM SERPL-SCNC: 139 MMOL/L (ref 135–145)

## 2025-04-22 RX ORDER — POTASSIUM CHLORIDE 1500 MG/1
20 TABLET, EXTENDED RELEASE ORAL 2 TIMES DAILY
Qty: 14 TABLET | Refills: 0 | Status: SHIPPED | OUTPATIENT
Start: 2025-04-22 | End: 2025-04-22

## 2025-04-22 RX ORDER — POTASSIUM CHLORIDE 1500 MG/1
20 TABLET, EXTENDED RELEASE ORAL 2 TIMES DAILY
Qty: 14 TABLET | Refills: 0 | Status: SHIPPED | OUTPATIENT
Start: 2025-04-22

## 2025-04-22 NOTE — TELEPHONE ENCOUNTER
Crys, patient's care coordinator, calls to report that patient's guardian, Dalia, is refusing to approve the patient to receive the potassium supplement.     Patient had hip surgery 4/14/25, during hospitalization and after hospitalization Potassium values have been running low. Dr. Love recommended patient take potassium chloride twice daily for 7 days and then recheck labs in a week.     Crys explains that guardian wants patient to receive potassium via diet and not pills as patient has quite a bit of pills already. Crys is requesting if someone can call guardian and explain the importance of taking supplement and the consequences of low K+.     Summer RN 4:53 PM April 22, 2025   [facility]

## 2025-04-23 NOTE — TELEPHONE ENCOUNTER
Recommend potassium replacement as hypokalemia can precipitate tachyarrhythmias or irregular heartbeats which can be life-threatening.    EULALIA

## 2025-04-23 NOTE — TELEPHONE ENCOUNTER
Per mom she states it is ok to give supplement and will follow plan as advised.     She will call Crys and give approval for potassium supplement     Eliana Concepcion RN

## 2025-04-30 ENCOUNTER — LAB (OUTPATIENT)
Dept: LAB | Facility: CLINIC | Age: 45
End: 2025-04-30
Payer: COMMERCIAL

## 2025-04-30 DIAGNOSIS — E87.6 HYPOKALEMIA: ICD-10-CM

## 2025-04-30 PROCEDURE — 84132 ASSAY OF SERUM POTASSIUM: CPT

## 2025-04-30 PROCEDURE — 36415 COLL VENOUS BLD VENIPUNCTURE: CPT

## 2025-04-30 PROCEDURE — 83735 ASSAY OF MAGNESIUM: CPT

## 2025-05-01 ENCOUNTER — OFFICE VISIT (OUTPATIENT)
Dept: ENDOCRINOLOGY | Facility: CLINIC | Age: 45
End: 2025-05-01
Payer: COMMERCIAL

## 2025-05-01 ENCOUNTER — HOSPITAL ENCOUNTER (OUTPATIENT)
Facility: CLINIC | Age: 45
End: 2025-05-01
Attending: ORTHOPAEDIC SURGERY | Admitting: ORTHOPAEDIC SURGERY
Payer: COMMERCIAL

## 2025-05-01 VITALS
WEIGHT: 315 LBS | SYSTOLIC BLOOD PRESSURE: 124 MMHG | BODY MASS INDEX: 42.66 KG/M2 | OXYGEN SATURATION: 98 % | HEART RATE: 82 BPM | HEIGHT: 72 IN | DIASTOLIC BLOOD PRESSURE: 78 MMHG

## 2025-05-01 DIAGNOSIS — E66.813 CLASS 3 SEVERE OBESITY DUE TO EXCESS CALORIES WITH SERIOUS COMORBIDITY AND BODY MASS INDEX (BMI) OF 50.0 TO 59.9 IN ADULT (H): ICD-10-CM

## 2025-05-01 LAB
MAGNESIUM SERPL-MCNC: 2 MG/DL (ref 1.7–2.3)
POTASSIUM SERPL-SCNC: 3.5 MMOL/L (ref 3.4–5.3)

## 2025-05-01 RX ORDER — CHLORHEXIDINE GLUCONATE ORAL RINSE 1.2 MG/ML
SOLUTION DENTAL
COMMUNITY
Start: 2025-01-24

## 2025-05-01 ASSESSMENT — PAIN SCALES - GENERAL: PAINLEVEL_OUTOF10: NO PAIN (0)

## 2025-05-01 NOTE — PROGRESS NOTES
Return Medical Weight Management Note     Kimo Greenwood  MRN:  [id number]  :  1980  SALEEM:  2025    Dear Ciro Love MD,    I had the pleasure of seeing your patient Kimo Greenwood. He is a 44 year old male who I am continuing to see for treatment of obesity related to:        2023     1:43 PM   --   I have the following health issues associated with obesity None of the above       Assessment & Plan   Problem List Items Addressed This Visit    None  Visit Diagnoses       Class 3 severe obesity due to excess calories with serious comorbidity and body mass index (BMI) of 50.0 to 59.9 in adult (H)        Relevant Medications    tirzepatide-Weight Management (ZEPBOUND) 12.5 MG/0.5ML prefilled pen           Continue Zepbound 12.5mg once weekly. Refills sent.   Fluid goal - decreasing sugar drinks  Food goal - adding in a snack 1xdaily   Nya Camarena in 3 months       INTERVAL HISTORY:  First seen for NBS - 2023  Continued with MWM, concerned for post op diet.   Weight loss required for hip replacement <330lbs  Transitioned to Wegovy 1.0mg   3/13/23 - continued Wegovy 1.0mg, goal of decreasing pop to 1-2 bottles daily   Reached out via [employer] 23 - increased Wegovy 1.7mg   23 - continued Wegovy 1.7mg   2023- continued wegovy 1.7, was finding it helpful with hunger and weight loss    Reached out via [employer] and increased Wegovy 2.4mg    Tried to transition to zepbound, but was not on formulary  Transitioned to Zepbound        Since last had hip replacement on 2025. Has recovered well. Goal to have both knees replaced this summer.     Has seen some weight gain since surgery, but have some swelling which is normal after surgery per the surgeon. He has also had some more juice and was less active the pasts 2 weeeks.     Anti-obesity medication history    Current:   Zepbound 12.5mg - continues to be helpful. Is happy at this dose.     Recent diet changes: Continues to working on  increasing vegetables. Eating 1xday   Drinking fresca, pepsi ( 6 pack weekly), juice (1-2cups)    Recent exercise/activity changes: continues to be limited due to bilateral knee pain. Hip pain has improved since surgery.       CURRENT WEIGHT:   357 lbs 1.6 oz    Initial Weight (lbs): 407 lbs  Last Visits Weight: 151.4 kg (333 lb 11.2 oz)  Cumulative weight loss (lbs): 49.9  Weight Loss Percentage: 12.26%    Wt Readings from Last 5 Encounters:   05/01/25 (!) 162 kg (357 lb 1.6 oz)   04/15/25 (!) 151.4 kg (333 lb 11.2 oz)   03/26/25 (!) 141.9 kg (312 lb 12.8 oz)   01/23/25 (!) 155.9 kg (343 lb 12.8 oz)   10/10/24 (!) 159 kg (350 lb 9.6 oz)             4/30/2025     5:16 PM   Changes and Difficulties   I have made the following changes to my diet since my last visit: Chobani yogurt and yogurt drinks, little more fruit, a little less pop.   With regards to my diet, I am still struggling with: Veggies, water   I have made the following changes to my activity/exercise since my last visit: None   With regards to my activity/exercise, I am still struggling with: Pain in knees limiting activity         MEDICATIONS:   Current Outpatient Medications   Medication Sig Dispense Refill    calcium polycarbophil (FIBER-LAX) 625 MG tablet Take 1 tablet (625 mg) by mouth daily. 90 tablet 3    chlorhexidine (PERIDEX) 0.12 % solution       DULoxetine (CYMBALTA) 60 MG capsule Take 120 mg by mouth daily       econazole nitrate 1 % external cream as needed.      ferrous sulfate (FEROSUL) 325 (65 Fe) MG tablet Take 1 tablet (325 mg) by mouth daily (with breakfast) 30 tablet 2    hydrochlorothiazide (HYDRODIURIL) 25 MG tablet TAKE 1 TABLET BY MOUTH DAILY 90 tablet 3    hydrocortisone 2.5 % cream 2 times daily as needed.      ketoconazole (NIZORAL) 2 % external cream 2 times daily as needed.      mirabegron (MYRBETRIQ) 50 MG 24 hr tablet Take 1 tablet by mouth daily      nystatin-triamcinolone (MYCOLOG II) 185801-3.1 UNIT/GM-% external cream  Apply topically 2 times daily (Patient taking differently: Apply topically 2 times daily as needed.) 60 g 0    oxyBUTYnin ER (DITROPAN XL) 15 MG 24 hr tablet Take 2 tablets (30 mg) by mouth daily 60 tablet 1    pantoprazole (PROTONIX) 40 MG EC tablet TAKE 1 TABLET BY MOUTH ONCE DAILY 30-60 MINUTE(S) BEFORE FIRST MEAL OF THE DAY 28 tablet 10    prazosin (MINIPRESS) 1 MG capsule Take 3 mg by mouth At Bedtime      tirzepatide-Weight Management (ZEPBOUND) 12.5 MG/0.5ML prefilled pen Inject 0.5 mLs (12.5 mg) subcutaneously every 7 days. 6 mL 1    acetaminophen (TYLENOL) 325 MG tablet Take 2 tablets (650 mg) by mouth every 4 hours as needed for other (mild pain). 100 tablet 0    aspirin (ASA) 325 MG EC tablet Take 1 tablet (325 mg) by mouth 2 times daily. 70 tablet 0    hydrOXYzine HCl (ATARAX) 25 MG tablet Take 1-2 tablets (25-50 mg) by mouth every 6 hours as needed for itching or anxiety (with pain, moderate pain). 30 tablet 0    ibuprofen (ADVIL/MOTRIN) 600 MG tablet Take 1 tablet (600 mg) by mouth every 6 hours as needed for mild pain. 30 tablet 0    OLANZapine (ZYPREXA) 10 MG tablet Take 10 mg by mouth every evening.      oxyCODONE (ROXICODONE) 5 MG tablet Take 1-2 tablets (5-10 mg) by mouth every 4 hours as needed for moderate to severe pain. 26 tablet 0    potassium chloride ER (K-TAB) 20 MEQ CR tablet Take 1 tablet (20 mEq) by mouth 2 times daily. 14 tablet 0    senna-docusate (SENOKOT-S/PERICOLACE) 8.6-50 MG tablet Take 1-2 tablets by mouth 2 times daily. Take while on oral narcotics to prevent or treat constipation. 30 tablet 0           4/30/2025     5:16 PM   Weight Loss Medication History Reviewed With Patient   Are you having any side effects from the weight loss medication that we have prescribed you? No         Objective    /78 (BP Location: Right arm, Patient Position: Sitting, Cuff Size: Adult Large)   Pulse 82   Ht 1.829 m (6')   Wt (!) 162 kg (357 lb 1.6 oz)   SpO2 98%   BMI 48.43 kg/m         PHYSICAL EXAM:  GENERAL: alert and no distress  EYES: Eyes grossly normal to inspection.  No discharge or erythema, or obvious scleral/conjunctival abnormalities.  RESP: No audible wheeze, cough, or visible cyanosis.    SKIN: Visible skin clear. No significant rash, abnormal pigmentation or lesions.  NEURO: Cranial nerves grossly intact.  Mentation and speech appropriate for age.  PSYCH: Appropriate affect, tone, and pace of words        Sincerely,    Nya Lara PA-C      30 minutes spent by me on the date of the encounter doing chart review, history and exam, documentation and further activities per the note    The longitudinal plan of care for the diagnosis(es)/condition(s) as documented were addressed during this visit. Due to the added complexity in care, I will continue to support Willard in the subsequent management and with ongoing continuity of care.

## 2025-05-01 NOTE — NURSING NOTE
(   Chief Complaint   Patient presents with    Follow Up     Return MW    )    ( Weight: (!) 162 kg (357 lb 1.6 oz) )  ( Height: 182.9 cm (6') )  ( BMI (Calculated): 48.43 )  (   )  (   )  (   )  (   )  (   )  (   )    ( BP: 124/78 )  (   )  (   )  (   )  ( Pulse: 82 )  (   )  ( SpO2: 98 % )    (   Patient Active Problem List   Diagnosis    Speech/language delay    Tobacco use disorder    Nocturnal enuresis    Moderate major depression (H)    LOREN (obstructive sleep apnea)    Essential hypertension    Class 3 severe obesity with serious comorbidity and body mass index (BMI) of 50.0 to 59.9 in adult (H)    Leukocytosis    Suicidal ideation    Chronic low back pain    Bilateral low back pain with left-sided sciatica    History of colonic polyps    Mild intellectual disability    S/P total hip arthroplasty    Vitamin D deficiency    LPRD (laryngopharyngeal reflux disease)    Patel's esophagus determined by biopsy    Mild intermittent asthma    Somatic dysfunction of lumbar region    Somatic dysfunction of sacral region    Sacral pain    Thoracic segment dysfunction    Hip pain, left    Duodenitis    Overactive bladder    Venous stasis dermatitis of both lower extremities    Severe recurrent major depressive disorder with psychotic features (H)    Alcohol use disorder, mild, abuse    Cannabis use disorder, mild, abuse    Mood change    S/P total right hip arthroplasty    )  (   Current Outpatient Medications   Medication Sig Dispense Refill    calcium polycarbophil (FIBER-LAX) 625 MG tablet Take 1 tablet (625 mg) by mouth daily. 90 tablet 3    chlorhexidine (PERIDEX) 0.12 % solution       DULoxetine (CYMBALTA) 60 MG capsule Take 120 mg by mouth daily       econazole nitrate 1 % external cream as needed.      ferrous sulfate (FEROSUL) 325 (65 Fe) MG tablet Take 1 tablet (325 mg) by mouth daily (with breakfast) 30 tablet 2    hydrochlorothiazide (HYDRODIURIL) 25 MG tablet TAKE 1 TABLET BY MOUTH DAILY 90 tablet 3     hydrocortisone 2.5 % cream 2 times daily as needed.      ketoconazole (NIZORAL) 2 % external cream 2 times daily as needed.      mirabegron (MYRBETRIQ) 50 MG 24 hr tablet Take 1 tablet by mouth daily      nystatin-triamcinolone (MYCOLOG II) 531801-7.1 UNIT/GM-% external cream Apply topically 2 times daily (Patient taking differently: Apply topically 2 times daily as needed.) 60 g 0    oxyBUTYnin ER (DITROPAN XL) 15 MG 24 hr tablet Take 2 tablets (30 mg) by mouth daily 60 tablet 1    pantoprazole (PROTONIX) 40 MG EC tablet TAKE 1 TABLET BY MOUTH ONCE DAILY 30-60 MINUTE(S) BEFORE FIRST MEAL OF THE DAY 28 tablet 10    prazosin (MINIPRESS) 1 MG capsule Take 3 mg by mouth At Bedtime      tirzepatide-Weight Management (ZEPBOUND) 12.5 MG/0.5ML prefilled pen Inject 0.5 mLs (12.5 mg) subcutaneously every 7 days. (Patient taking differently: Inject 12.5 mg subcutaneously every 7 days. On Friday) 6 mL 1    acetaminophen (TYLENOL) 325 MG tablet Take 2 tablets (650 mg) by mouth every 4 hours as needed for other (mild pain). 100 tablet 0    aspirin (ASA) 325 MG EC tablet Take 1 tablet (325 mg) by mouth 2 times daily. 70 tablet 0    hydrOXYzine HCl (ATARAX) 25 MG tablet Take 1-2 tablets (25-50 mg) by mouth every 6 hours as needed for itching or anxiety (with pain, moderate pain). 30 tablet 0    ibuprofen (ADVIL/MOTRIN) 600 MG tablet Take 1 tablet (600 mg) by mouth every 6 hours as needed for mild pain. 30 tablet 0    OLANZapine (ZYPREXA) 10 MG tablet Take 10 mg by mouth every evening.      oxyCODONE (ROXICODONE) 5 MG tablet Take 1-2 tablets (5-10 mg) by mouth every 4 hours as needed for moderate to severe pain. 26 tablet 0    potassium chloride ER (K-TAB) 20 MEQ CR tablet Take 1 tablet (20 mEq) by mouth 2 times daily. 14 tablet 0    senna-docusate (SENOKOT-S/PERICOLACE) 8.6-50 MG tablet Take 1-2 tablets by mouth 2 times daily. Take while on oral narcotics to prevent or treat constipation. 30 tablet 0    )  ( Diabetes Eval:     )    ( Pain Eval:  No Pain (0) )    ( Wound Eval:       )    (   History   Smoking Status    Former    Types: Cigarettes, Other   Smokeless Tobacco    Former    Types: Chew    Quit date: 2/28/2025    )    ( Signed By:  Tania Temple, EMT May 1, 2025; 2:59 PM )

## 2025-05-01 NOTE — LETTER
2025       RE: Kimo Greenwood  [address]  [place] [address]     Dear Colleague,    Thank you for referring your patient, Kimo Greenwood, to the [facility] [place] [facility]. Please see a copy of my visit note below.      Return Medical Weight Management Note     Kimo Greenwood  MRN:  [id number]  :  1980  SALEEM:  2025    Dear Ciro Love MD,    I had the pleasure of seeing your patient Kimo Greenwood. He is a 44 year old male who I am continuing to see for treatment of obesity related to:        2023     1:43 PM   --   I have the following health issues associated with obesity None of the above       Assessment & Plan  Problem List Items Addressed This Visit    None  Visit Diagnoses       Class 3 severe obesity due to excess calories with serious comorbidity and body mass index (BMI) of 50.0 to 59.9 in adult (H)        Relevant Medications    tirzepatide-Weight Management (ZEPBOUND) 12.5 MG/0.5ML prefilled pen           Continue Zepbound 12.5mg once weekly. Refills sent.   Fluid goal - decreasing sugar drinks  Food goal - adding in a snack 1xdaily   Nya Camarena in 3 months       INTERVAL HISTORY:  First seen for NBS - 2023  Continued with MWM, concerned for post op diet.   Weight loss required for hip replacement <330lbs  Transitioned to Wegovy 1.0mg   3/13/23 - continued Wegovy 1.0mg, goal of decreasing pop to 1-2 bottles daily   Reached out via [employer] 23 - increased Wegovy 1.7mg   23 - continued Wegovy 1.7mg   2023- continued wegovy 1.7, was finding it helpful with hunger and weight loss    Reached out via [employer] and increased Wegovy 2.4mg    Tried to transition to zepbound, but was not on formulary  Transitioned to Zepbound        Since last had hip replacement on 2025. Has recovered well. Goal to have both knees replaced this summer.     Has seen some  weight gain since surgery, but have some swelling which is normal after surgery per the surgeon. He has also had some more juice and was less active the pasts 2 weeeks.     Anti-obesity medication history    Current:   Zepbound 12.5mg - continues to be helpful. Is happy at this dose.     Recent diet changes: Continues to working on increasing vegetables. Eating 1xday   Drinking fresca, pepsi ( 6 pack weekly), juice (1-2cups)    Recent exercise/activity changes: continues to be limited due to bilateral knee pain. Hip pain has improved since surgery.       CURRENT WEIGHT:   357 lbs 1.6 oz    Initial Weight (lbs): 407 lbs  Last Visits Weight: 151.4 kg (333 lb 11.2 oz)  Cumulative weight loss (lbs): 49.9  Weight Loss Percentage: 12.26%    Wt Readings from Last 5 Encounters:   05/01/25 (!) 162 kg (357 lb 1.6 oz)   04/15/25 (!) 151.4 kg (333 lb 11.2 oz)   03/26/25 (!) 141.9 kg (312 lb 12.8 oz)   01/23/25 (!) 155.9 kg (343 lb 12.8 oz)   10/10/24 (!) 159 kg (350 lb 9.6 oz)             4/30/2025     5:16 PM   Changes and Difficulties   I have made the following changes to my diet since my last visit: [employer] yogurt and yogurt drinks, little more fruit, a little less pop.   With regards to my diet, I am still struggling with: Veggies, water   I have made the following changes to my activity/exercise since my last visit: None   With regards to my activity/exercise, I am still struggling with: Pain in knees limiting activity         MEDICATIONS:   Current Outpatient Medications   Medication Sig Dispense Refill     calcium polycarbophil (FIBER-LAX) 625 MG tablet Take 1 tablet (625 mg) by mouth daily. 90 tablet 3     chlorhexidine (PERIDEX) 0.12 % solution        DULoxetine (CYMBALTA) 60 MG capsule Take 120 mg by mouth daily        econazole nitrate 1 % external cream as needed.       ferrous sulfate (FEROSUL) 325 (65 Fe) MG tablet Take 1 tablet (325 mg) by mouth daily (with breakfast) 30 tablet 2     hydrochlorothiazide  (HYDRODIURIL) 25 MG tablet TAKE 1 TABLET BY MOUTH DAILY 90 tablet 3     hydrocortisone 2.5 % cream 2 times daily as needed.       ketoconazole (NIZORAL) 2 % external cream 2 times daily as needed.       mirabegron (MYRBETRIQ) 50 MG 24 hr tablet Take 1 tablet by mouth daily       nystatin-triamcinolone (MYCOLOG II) 255771-9.1 UNIT/GM-% external cream Apply topically 2 times daily (Patient taking differently: Apply topically 2 times daily as needed.) 60 g 0     oxyBUTYnin ER (DITROPAN XL) 15 MG 24 hr tablet Take 2 tablets (30 mg) by mouth daily 60 tablet 1     pantoprazole (PROTONIX) 40 MG EC tablet TAKE 1 TABLET BY MOUTH ONCE DAILY 30-60 MINUTE(S) BEFORE FIRST MEAL OF THE DAY 28 tablet 10     prazosin (MINIPRESS) 1 MG capsule Take 3 mg by mouth At Bedtime       tirzepatide-Weight Management (ZEPBOUND) 12.5 MG/0.5ML prefilled pen Inject 0.5 mLs (12.5 mg) subcutaneously every 7 days. 6 mL 1     acetaminophen (TYLENOL) 325 MG tablet Take 2 tablets (650 mg) by mouth every 4 hours as needed for other (mild pain). 100 tablet 0     aspirin (ASA) 325 MG EC tablet Take 1 tablet (325 mg) by mouth 2 times daily. 70 tablet 0     hydrOXYzine HCl (ATARAX) 25 MG tablet Take 1-2 tablets (25-50 mg) by mouth every 6 hours as needed for itching or anxiety (with pain, moderate pain). 30 tablet 0     ibuprofen (ADVIL/MOTRIN) 600 MG tablet Take 1 tablet (600 mg) by mouth every 6 hours as needed for mild pain. 30 tablet 0     OLANZapine (ZYPREXA) 10 MG tablet Take 10 mg by mouth every evening.       oxyCODONE (ROXICODONE) 5 MG tablet Take 1-2 tablets (5-10 mg) by mouth every 4 hours as needed for moderate to severe pain. 26 tablet 0     potassium chloride ER (K-TAB) 20 MEQ CR tablet Take 1 tablet (20 mEq) by mouth 2 times daily. 14 tablet 0     senna-docusate (SENOKOT-S/PERICOLACE) 8.6-50 MG tablet Take 1-2 tablets by mouth 2 times daily. Take while on oral narcotics to prevent or treat constipation. 30 tablet 0           4/30/2025      5:16 PM   Weight Loss Medication History Reviewed With Patient   Are you having any side effects from the weight loss medication that we have prescribed you? No         Objective   /78 (BP Location: Right arm, Patient Position: Sitting, Cuff Size: Adult Large)   Pulse 82   Ht 1.829 m (6')   Wt (!) 162 kg (357 lb 1.6 oz)   SpO2 98%   BMI 48.43 kg/m        PHYSICAL EXAM:  GENERAL: alert and no distress  EYES: Eyes grossly normal to inspection.  No discharge or erythema, or obvious scleral/conjunctival abnormalities.  RESP: No audible wheeze, cough, or visible cyanosis.    SKIN: Visible skin clear. No significant rash, abnormal pigmentation or lesions.  NEURO: Cranial nerves grossly intact.  Mentation and speech appropriate for age.  PSYCH: Appropriate affect, tone, and pace of words        Sincerely,    Nya Lara PA-C      30 minutes spent by me on the date of the encounter doing chart review, history and exam, documentation and further activities per the note    The longitudinal plan of care for the diagnosis(es)/condition(s) as documented were addressed during this visit. Due to the added complexity in care, I will continue to support Willard in the subsequent management and with ongoing continuity of care.      Again, thank you for allowing me to participate in the care of your patient.      Sincerely,    Nya Lara PA-C

## 2025-05-02 NOTE — PATIENT INSTRUCTIONS
Visit Plan:     Continue Zepbound 12.5mg once weekly. Refills sent.   Fluid goal - decreasing sugar drinks  Food goal - adding in a snack 1xdamehul Camarena in 3 months

## 2025-05-07 ENCOUNTER — TELEPHONE (OUTPATIENT)
Dept: PHARMACY | Facility: CLINIC | Age: 45
End: 2025-05-07
Payer: COMMERCIAL

## 2025-05-07 NOTE — TELEPHONE ENCOUNTER
General Call    Contacts       Contact Date/Time Type Contact Phone/Fax    05/07/2025 11:46 AM CDT Phone (Incoming) Crys K (Emergency Contact) [phone] (M)          Reason for Call:  Wants to talk to MTM about medication.    What are your questions or concerns:  Wants change in psychotropic medication. Would like her to provide written recommendations for the prescribing provider and guardian for the 05/19/2025 appointment.     Date of last appointment with provider: 03/21/2025    Could we send this information to you in [employer] or would you prefer to receive a phone call?:   Patient would prefer a phone call   Okay to leave a detailed message?: N/A at Other phone number:  Please reach out to the guardian and prescribing provider with the above information. You can call Crys with any questions.  *

## 2025-05-08 ENCOUNTER — OFFICE VISIT (OUTPATIENT)
Dept: URGENT CARE | Facility: URGENT CARE | Age: 45
End: 2025-05-08
Payer: COMMERCIAL

## 2025-05-08 VITALS
OXYGEN SATURATION: 100 % | HEART RATE: 71 BPM | SYSTOLIC BLOOD PRESSURE: 130 MMHG | RESPIRATION RATE: 16 BRPM | BODY MASS INDEX: 42.66 KG/M2 | HEIGHT: 72 IN | TEMPERATURE: 97.6 F | DIASTOLIC BLOOD PRESSURE: 83 MMHG | WEIGHT: 315 LBS

## 2025-05-08 DIAGNOSIS — R21 RASH: Primary | ICD-10-CM

## 2025-05-08 RX ORDER — PREDNISONE 20 MG/1
60 TABLET ORAL DAILY
Qty: 15 TABLET | Refills: 0 | Status: SHIPPED | OUTPATIENT
Start: 2025-05-08 | End: 2025-05-13

## 2025-05-08 NOTE — PROGRESS NOTES
Assessment:       Rash  - predniSONE (DELTASONE) 20 MG tablet  Dispense: 15 tablet; Refill: 0         Plan:     With new widespread rash on his face and arms possibly due to the diuretic that was just started in the last couple days.  Recommend stopping the diuretic and follow-up with his PCP regarding this.  Prescription for prednisone given for the rash.  Follow-up if getting worse or not improving over the next 4 to 5 days with his PCP.    MEDICATIONS:   Orders Placed This Encounter   Medications    predniSONE (DELTASONE) 20 MG tablet     Sig: Take 3 tablets (60 mg) by mouth daily for 5 days.     Dispense:  15 tablet     Refill:  0     History obtained from independent historian: Patient's  Crys    Subjective:       44 year old male presents with his  Crys for evaluation of a widespread rash that started on his face and arms within a day of starting a new diuretic swelling in his legs.  No shortness of breath or wheezing.  No throat swelling or throat itching.  No facial swelling.  Rash is not painful or itchy.  He otherwise feels well.    Patient Active Problem List   Diagnosis    Speech/language delay    Tobacco use disorder    Nocturnal enuresis    Moderate major depression (H)    LOREN (obstructive sleep apnea)    Essential hypertension    Class 3 severe obesity with serious comorbidity and body mass index (BMI) of 50.0 to 59.9 in adult (H)    Leukocytosis    Suicidal ideation    Chronic low back pain    Bilateral low back pain with left-sided sciatica    History of colonic polyps    Mild intellectual disability    S/P total hip arthroplasty    Vitamin D deficiency    LPRD (laryngopharyngeal reflux disease)    Patel's esophagus determined by biopsy    Mild intermittent asthma    Somatic dysfunction of lumbar region    Somatic dysfunction of sacral region    Sacral pain    Thoracic segment dysfunction    Hip pain, left    Duodenitis    Overactive bladder    Venous stasis dermatitis of  both lower extremities    Severe recurrent major depressive disorder with psychotic features (H)    Alcohol use disorder, mild, abuse    Cannabis use disorder, mild, abuse    Mood change    S/P total right hip arthroplasty       Past Medical History:   Diagnosis Date    Arthritis     DDD hips and knees    Asthma     Asthma     Patel's esophagus     Chronic pain     Chronic pain - left hip/leg pain     degenerative disc disease, back, hips, knees    Gastroesophageal reflux disease     History of anesthesia complications     agitation x1 after interstim 2013    Hypertension     Hypertension     Leukocytosis     LPRD (laryngopharyngeal reflux disease)     Marijuana abuse     Marijuana abuse     MDD (major depressive disorder)     MDD (major depressive disorder)     Mental retardation, mild (I.Q. 50-70)     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    With associated speech/language delay    Obesity 11/11/2010    LOREN (obstructive sleep apnea)     Uses a CPAP    LOREN (obstructive sleep apnea)     Seborrheic dermatitis     Seborrheic dermatitis     Sleep apnea     CPAP       Past Surgical History:   Procedure Laterality Date    ARTHROPLASTY HIP Left 1/25/2017    Procedure: ARTHROPLASTY HIP;  Surgeon: Bala Randall MD;  Location: [facility]    ARTHROPLASTY HIP Left     ARTHROPLASTY HIP Right 4/14/2025    Procedure: Right total hip arthroplasty;  Surgeon: Bala Randall MD;  Location: [facility]    ARTHROPLASTY REVISION HIP Left 6/24/2019    Procedure: Revision left total hip arthroplasty with a head and liner exchange to a Biomet dual mobility head and liner.;  Surgeon: Bala Randall MD;  Location:  OR    BLADDER SURGERY      Fred, Alida,Interstem stimulator implant    CLOSED REDUCTION HIP Left 6/10/2019    Procedure: Closed reduction under general anesthesia left recurrent prosthetic hip dislocation;  Surgeon: Rafat Cazares MD;  Location:  OR    COLONOSCOPY N/A 10/30/2015     Procedure: COMBINED COLONOSCOPY, SINGLE OR MULTIPLE BIOPSY/POLYPECTOMY BY BIOPSY;  Surgeon: Alexis Jackson MD;  Location: [facility]    COLONOSCOPY N/A 11/17/2016    Procedure: COMBINED COLONOSCOPY, SINGLE OR MULTIPLE BIOPSY/POLYPECTOMY BY BIOPSY;  Surgeon: Cat Huber MD;  Location: [facility]    COLONOSCOPY N/A 10/28/2020    Procedure: COLONOSCOPY;  Surgeon: You Kraus MD;  Location: [facility]    COLONOSCOPY      ESOPHAGOSCOPY, GASTROSCOPY, DUODENOSCOPY (EGD), COMBINED N/A 11/17/2016    Procedure: COMBINED ESOPHAGOSCOPY, GASTROSCOPY, DUODENOSCOPY (EGD), BIOPSY SINGLE OR MULTIPLE;  Surgeon: Cat Huber MD;  Location: [facility]    ESOPHAGOSCOPY, GASTROSCOPY, DUODENOSCOPY (EGD), COMBINED N/A 3/12/2020    Procedure: ESOPHAGOGASTRODUODENOSCOPY WITH BIOPSIES;  Surgeon: You Kraus MD;  Location: [facility]    ESOPHAGOSCOPY, GASTROSCOPY, DUODENOSCOPY (EGD), COMBINED N/A 10/28/2020    Procedure: ESOPHAGOGASTRODUODENOSCOPY, WITH BIOPSY;  Surgeon: You Kraus MD;  Location: [facility]    ESOPHAGOSCOPY, GASTROSCOPY, DUODENOSCOPY (EGD), COMBINED      ESOPHAGOSCOPY, GASTROSCOPY, DUODENOSCOPY (EGD), COMBINED N/A 11/29/2023    Procedure: Esophagoscopy, gastroscopy, duodenoscopy (EGD), combined with biopsies using forceps;  Surgeon: You Kraus MD;  Location: [facility]    EXAM UNDER ANESTHESIA, FULGURATE CONDYLOMA ANUS, COMBINED N/A 12/22/2016    Procedure: COMBINED EXAM UNDER ANESTHESIA, FULGURATE CONDYLOMA ANUS;  Surgeon: Nya Cabello MD;  Location: [facility]    GENITOURINARY SURGERY      JOINT REPLACEMENT Left 2017    MARGO, Revision Left MARGO    OTHER SURGICAL HISTORY      interstim stimulator implant    ME CYSTOURETHROSCOPY INJ CHEMODENERVATION BLADDER N/A 1/16/2019    Procedure: CYSTOSCOPY BOTOX BLADDER INJECTIONS (100 UNITS IN 10CC);  Surgeon: Didier Ibarra MD;  Location: [facility];  Service: Urology    ME IMPLANT PERIPH/GASTRIC NEUROSTIM/ N/A 1/8/2020     Procedure: NEW INTERSTIM LEAD, REPLACE OLD; NEW INTERSTIM BATTERY, REPLACE OLD;  Surgeon: Didier Ibarra MD;  Location: [facility];  Service: Urology       Current Outpatient Medications   Medication Sig Dispense Refill    calcium polycarbophil (FIBER-LAX) 625 MG tablet Take 1 tablet (625 mg) by mouth daily. 90 tablet 3    chlorhexidine (PERIDEX) 0.12 % solution       DULoxetine (CYMBALTA) 60 MG capsule Take 120 mg by mouth daily       econazole nitrate 1 % external cream as needed.      ferrous sulfate (FEROSUL) 325 (65 Fe) MG tablet TAKE 1 TABLET BY MOUTH DAILY WITH BREAKFAST 28 tablet 11    hydrochlorothiazide (HYDRODIURIL) 25 MG tablet TAKE 1 TABLET BY MOUTH DAILY 90 tablet 3    hydrocortisone 2.5 % cream 2 times daily as needed.      ketoconazole (NIZORAL) 2 % external cream 2 times daily as needed.      LYBALVI 15-10 MG TABS tablet       mirabegron (MYRBETRIQ) 50 MG 24 hr tablet Take 1 tablet by mouth daily      nystatin-triamcinolone (MYCOLOG II) 544058-2.1 UNIT/GM-% external cream Apply topically 2 times daily 60 g 0    OLANZapine (ZYPREXA) 15 MG tablet       oxyBUTYnin ER (DITROPAN XL) 15 MG 24 hr tablet Take 2 tablets (30 mg) by mouth daily 60 tablet 1    pantoprazole (PROTONIX) 40 MG EC tablet TAKE 1 TABLET BY MOUTH ONCE DAILY 30-60 MINUTE(S) BEFORE FIRST MEAL OF THE DAY 28 tablet 10    potassium chloride tay ER (KLOR-CON M20) 20 MEQ CR tablet       prazosin (MINIPRESS) 1 MG capsule Take 3 mg by mouth At Bedtime      predniSONE (DELTASONE) 20 MG tablet Take 3 tablets (60 mg) by mouth daily for 5 days. 15 tablet 0    tirzepatide-Weight Management (ZEPBOUND) 12.5 MG/0.5ML prefilled pen Inject 0.5 mLs (12.5 mg) subcutaneously every 7 days. 6 mL 1     Current Facility-Administered Medications   Medication Dose Route Frequency Provider Last Rate Last Admin    cyanocobalamin injection 1,000 mcg  1,000 mcg Intramuscular Q30 Days    1,000 mcg at 12/10/24 1345       Allergies   Allergen Reactions     Other [No Clinical Screening - See Comments] Other (See Comments)     Awoke from anesthesia with anger and ripping off dressing, after interstim placement, outside hospital        Family History   Problem Relation Age of Onset    Anxiety Disorder Mother     Depression Mother     Ulcerative Colitis Mother 18    Breast Cancer Maternal Grandmother     Cerebrovascular Disease Maternal Grandmother     Breast Cancer Maternal Aunt     Cancer Maternal Grandfather         grt uncles colon cancer       Social History     Socioeconomic History    Marital status: Single     Spouse name: None    Number of children: None    Years of education: None    Highest education level: None   Tobacco Use    Smoking status: Former     Current packs/day: 0.00     Average packs/day: 1 pack/day for 1 year (1.0 ttl pk-yrs)     Types: Cigarettes, Other     Quit date: 2025     Years since quittin.1     Passive exposure: Current    Smokeless tobacco: Former     Types: Chew     Quit date: 2025    Tobacco comments:     Switched to an ecig with 0% nicotine. Current Marijuana Used.    Vaping Use    Vaping status: Every Day    Substances: Flavoring    Devices: Refillable tank   Substance and Sexual Activity    Alcohol use: Yes     Comment: socially    Drug use: Yes     Types: Marijuana     Comment: marijuanna    Sexual activity: Not Currently     Partners: Female     Birth control/protection: Condom   Other Topics Concern    Parent/sibling w/ CABG, MI or angioplasty before 65F 55M? No       Review of Systems  Pertinent items are noted in HPI.      Objective:     /83 (BP Location: Right arm, Patient Position: Sitting, Cuff Size: Adult Large)   Pulse 71   Temp 97.6  F (36.4  C) (Oral)   Resp 16   Ht 1.829 m (6')   Wt (!) 154.7 kg (341 lb)   SpO2 100%   BMI 46.25 kg/m       General appearance: alert, appears stated age, and cooperative  Throat: lips, mucosa, and tongue normal; teeth and gums normal  Lungs: clear to  auscultation bilaterally  Heart: Regular rate and rhythm without murmurs  Skin: Widespread maculopapular rash noted on patient's face wrists and lower arms.    This note has been dictated using voice recognition software. Any grammatical or context distortions are unintentional and inherent to the software

## 2025-05-08 NOTE — PATIENT INSTRUCTIONS
Stop taking your diuretic.  Take prednisone as prescribed.  Follow-up if symptoms are getting worse or not improving.

## 2025-05-08 NOTE — TELEPHONE ENCOUNTER
Routing to [facility] pharmacist to please review previous note and advise. Patient was last seen in virtual visit from 3/21/25.    CASTILLO NoyolaN RN  [facility]

## 2025-05-08 NOTE — PROGRESS NOTES
Urgent Care Clinic Visit    Chief Complaint   Patient presents with    Rash     All over face x2-3 days                 5/8/2025     2:02 PM   Additional Questions   Roomed by Nya Chin   Accompanied by Jose Care Coordinator

## 2025-05-18 NOTE — PROGRESS NOTES
HISTORY OF PRESENT ILLNESS:  Kimo Greenwood is here for a followup visit.  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  It has been biopsied and was benign.  He did have a followup and most recent CT scan was on 03/23/2023, which showed that the lesion was stable.  It measures about 9.9 cm in size.  This appears to be arising from the left adrenal gland and it is probably a myolipoma.  He has no symptoms from his left back or left hip area.  He is having symptoms from his right hip.    IMPRESSION:  Myolipoma of left adrenal gland.    PLAN:  This has been stable for a year.  I have suggested no additional imaging.  I have recommended that if he has pain and a lump in that area to come back and see me.    Total time 20 minutes, which included reviewing his most recent imaging and in-person visit.       today

## 2025-05-19 DIAGNOSIS — F33.9 RECURRENT MAJOR DEPRESSION: Primary | ICD-10-CM

## 2025-05-21 RX ORDER — CEFAZOLIN SODIUM/WATER 3 G/30 ML
3 SYRINGE (ML) INTRAVENOUS SEE ADMIN INSTRUCTIONS
Status: CANCELLED | OUTPATIENT
Start: 2025-05-21

## 2025-05-21 RX ORDER — TRANEXAMIC ACID 650 MG/1
1950 TABLET ORAL ONCE
Status: CANCELLED | OUTPATIENT
Start: 2025-05-21 | End: 2025-05-21

## 2025-05-21 RX ORDER — CEFAZOLIN SODIUM/WATER 3 G/30 ML
3 SYRINGE (ML) INTRAVENOUS
Status: CANCELLED | OUTPATIENT
Start: 2025-05-21

## 2025-05-21 RX ORDER — ACETAMINOPHEN 325 MG/1
975 TABLET ORAL ONCE
Status: CANCELLED | OUTPATIENT
Start: 2025-05-21 | End: 2025-05-21

## 2025-05-23 ENCOUNTER — HOSPITAL ENCOUNTER (EMERGENCY)
Facility: CLINIC | Age: 45
Discharge: HOME | End: 2025-05-23
Attending: EMERGENCY MEDICINE | Admitting: EMERGENCY MEDICINE
Payer: COMMERCIAL

## 2025-05-23 VITALS
TEMPERATURE: 97.8 F | HEART RATE: 76 BPM | HEIGHT: 72 IN | DIASTOLIC BLOOD PRESSURE: 80 MMHG | OXYGEN SATURATION: 97 % | RESPIRATION RATE: 20 BRPM | WEIGHT: 315 LBS | BODY MASS INDEX: 42.66 KG/M2 | SYSTOLIC BLOOD PRESSURE: 138 MMHG

## 2025-05-23 DIAGNOSIS — T81.31XA POSTOPERATIVE WOUND DEHISCENCE, INITIAL ENCOUNTER: ICD-10-CM

## 2025-05-23 LAB
ALBUMIN SERPL BCG-MCNC: 4.1 G/DL (ref 3.5–5.2)
ALP SERPL-CCNC: 78 U/L (ref 40–150)
ALT SERPL W P-5'-P-CCNC: 17 U/L (ref 0–70)
ANION GAP SERPL CALCULATED.3IONS-SCNC: 12 MMOL/L (ref 7–15)
AST SERPL W P-5'-P-CCNC: 11 U/L (ref 0–45)
BASOPHILS # BLD AUTO: 0 10E3/UL (ref 0–0.2)
BASOPHILS NFR BLD AUTO: 0 %
BILIRUB SERPL-MCNC: 0.3 MG/DL
BUN SERPL-MCNC: 16.5 MG/DL (ref 6–20)
CALCIUM SERPL-MCNC: 9.5 MG/DL (ref 8.8–10.4)
CHLORIDE SERPL-SCNC: 103 MMOL/L (ref 98–107)
CREAT SERPL-MCNC: 1.21 MG/DL (ref 0.67–1.17)
EGFRCR SERPLBLD CKD-EPI 2021: 76 ML/MIN/1.73M2
EOSINOPHIL # BLD AUTO: 0.1 10E3/UL (ref 0–0.7)
EOSINOPHIL NFR BLD AUTO: 1 %
ERYTHROCYTE [DISTWIDTH] IN BLOOD BY AUTOMATED COUNT: 12.8 % (ref 10–15)
GLUCOSE SERPL-MCNC: 99 MG/DL (ref 70–99)
HCO3 SERPL-SCNC: 26 MMOL/L (ref 22–29)
HCT VFR BLD AUTO: 36.1 % (ref 40–53)
HGB BLD-MCNC: 12.4 G/DL (ref 13.3–17.7)
HOLD SPECIMEN: NORMAL
HOLD SPECIMEN: NORMAL
IMM GRANULOCYTES # BLD: 0.1 10E3/UL
IMM GRANULOCYTES NFR BLD: 0 %
LACTATE SERPL-SCNC: 1 MMOL/L (ref 0.7–2)
LYMPHOCYTES # BLD AUTO: 2.4 10E3/UL (ref 0.8–5.3)
LYMPHOCYTES NFR BLD AUTO: 17 %
MCH RBC QN AUTO: 30.5 PG (ref 26.5–33)
MCHC RBC AUTO-ENTMCNC: 34.3 G/DL (ref 31.5–36.5)
MCV RBC AUTO: 89 FL (ref 78–100)
MONOCYTES # BLD AUTO: 0.8 10E3/UL (ref 0–1.3)
MONOCYTES NFR BLD AUTO: 6 %
NEUTROPHILS # BLD AUTO: 10.4 10E3/UL (ref 1.6–8.3)
NEUTROPHILS NFR BLD AUTO: 76 %
NRBC # BLD AUTO: 0 10E3/UL
NRBC BLD AUTO-RTO: 0 /100
PLATELET # BLD AUTO: 270 10E3/UL (ref 150–450)
POTASSIUM SERPL-SCNC: 3.5 MMOL/L (ref 3.4–5.3)
PROT SERPL-MCNC: 7.1 G/DL (ref 6.4–8.3)
RBC # BLD AUTO: 4.06 10E6/UL (ref 4.4–5.9)
SODIUM SERPL-SCNC: 141 MMOL/L (ref 135–145)
WBC # BLD AUTO: 13.8 10E3/UL (ref 4–11)

## 2025-05-23 PROCEDURE — 80053 COMPREHEN METABOLIC PANEL: CPT | Performed by: STUDENT IN AN ORGANIZED HEALTH CARE EDUCATION/TRAINING PROGRAM

## 2025-05-23 PROCEDURE — 83605 ASSAY OF LACTIC ACID: CPT | Performed by: EMERGENCY MEDICINE

## 2025-05-23 PROCEDURE — 87040 BLOOD CULTURE FOR BACTERIA: CPT | Performed by: EMERGENCY MEDICINE

## 2025-05-23 PROCEDURE — 85025 COMPLETE CBC W/AUTO DIFF WBC: CPT | Performed by: EMERGENCY MEDICINE

## 2025-05-23 PROCEDURE — 80053 COMPREHEN METABOLIC PANEL: CPT | Performed by: EMERGENCY MEDICINE

## 2025-05-23 PROCEDURE — 36415 COLL VENOUS BLD VENIPUNCTURE: CPT | Performed by: EMERGENCY MEDICINE

## 2025-05-23 PROCEDURE — 36415 COLL VENOUS BLD VENIPUNCTURE: CPT | Performed by: STUDENT IN AN ORGANIZED HEALTH CARE EDUCATION/TRAINING PROGRAM

## 2025-05-23 PROCEDURE — 99283 EMERGENCY DEPT VISIT LOW MDM: CPT

## 2025-05-23 PROCEDURE — 83605 ASSAY OF LACTIC ACID: CPT | Performed by: STUDENT IN AN ORGANIZED HEALTH CARE EDUCATION/TRAINING PROGRAM

## 2025-05-23 PROCEDURE — 85018 HEMOGLOBIN: CPT | Performed by: STUDENT IN AN ORGANIZED HEALTH CARE EDUCATION/TRAINING PROGRAM

## 2025-05-23 ASSESSMENT — COLUMBIA-SUICIDE SEVERITY RATING SCALE - C-SSRS
6. HAVE YOU EVER DONE ANYTHING, STARTED TO DO ANYTHING, OR PREPARED TO DO ANYTHING TO END YOUR LIFE?: NO
2. HAVE YOU ACTUALLY HAD ANY THOUGHTS OF KILLING YOURSELF IN THE PAST MONTH?: NO
1. IN THE PAST MONTH, HAVE YOU WISHED YOU WERE DEAD OR WISHED YOU COULD GO TO SLEEP AND NOT WAKE UP?: NO

## 2025-05-23 ASSESSMENT — ACTIVITIES OF DAILY LIVING (ADL)
ADLS_ACUITY_SCORE: 59

## 2025-05-24 NOTE — ED PROVIDER NOTES
Emergency Department Note      History of Present Illness     Chief Complaint   Post-op Problem      HPI   Kimo Greenwood is a 44 year old male with a right hip replacement 6 weeks ago presenting to the Emergency Department with his mother for evaluation of opening of surgical incision. Patients mother reports wound has been open for the past 6 weeks, but the opening got bigger in the last week. She has also noticed increased serous drainage, with the wound draining through the bandage for the first time today. They have been doing wet to dry wound treatment with a home care provider. Patient denies any fever, chills, vomiting, or chest pain. They visited [facility] earlier today, who sent them here for further evaluation. He has his 6 week surgical follow up appointment on Tuesday. Patient has a knee replacement surgery in 3 weeks and is concerned he will have to postpone this due to the incision. Mother adds that patient has chronic high WBC count for the last few years. Per mother, he has already had a course of antibiotics for this incision.     Independent Historian   Mother as detailed above.    Review of External Notes   Reviewed operative note from 4/14/2025 patient discharged on aspirin and Keflex after hip replacement.    Past Medical History     Medical History and Problem List   Arthritis  Asthma  Asthma  Patel's esophagus  Chronic pain  GERD  Hypertension  Leukocytosis  Marijuana abuse  Depression  Cognitive impairment  Obesity  LOREN  Seborrheic dermatitis    Medications   Cymbalta  Ferosul  Hydrodiuril  Lybalvi  Mirabegron  Olanzapine  Ditropan  Protonix  Prazosin  Zepbound    Surgical History   Arthroplasty hip, left x2, right x1  Bladder surgery  Closed reduction, left hip  Colonoscopy x4  EGD x5   Surgery  Hip replacement, left  Cystoscopy    Physical Exam     Patient Vitals for the past 24 hrs:   BP Temp Temp src Pulse Resp SpO2 Height Weight   05/23/25 2301 138/80 -- -- 76 20 97 % -- --    05/23/25 2230 117/59 -- -- 74 -- 98 % -- --   05/23/25 2220 113/69 -- -- -- -- 97 % -- --   05/23/25 2200 123/78 -- -- 78 -- 95 % -- --   05/23/25 2130 124/65 -- -- 75 -- 96 % -- --   05/23/25 2120 129/68 -- -- 73 -- 100 % -- --   05/23/25 1916 (!) 127/98 -- -- -- -- -- -- --   05/23/25 1915 -- 97.8  F (36.6  C) Oral 80 18 99 % 1.829 m (6') (!) 152 kg (335 lb 1.6 oz)     Physical Exam  General: Resting on the bed.  Head: No obvious trauma to head.  Ears, Nose, Throat:  External ears normal.  Nose normal.    Eyes:  Conjunctivae clear.    CV: Regular rate and rhythm.  No murmurs.      Respiratory: Effort normal and breath sounds normal.  No wheezing or crackles.   Gastrointestinal: Soft.  No distension. There is no tenderness.    Neuro: Alert. Moving all extremities appropriately.  Normal speech.  Normal gait   Skin: Skin is warm and dry.  see wound below measuring 3 cm x 1.5 cm          Diagnostics     Lab Results   Labs Ordered and Resulted from Time of ED Arrival to Time of ED Departure   COMPREHENSIVE METABOLIC PANEL - Abnormal       Result Value    Sodium 141      Potassium 3.5      Carbon Dioxide (CO2) 26      Anion Gap 12      Urea Nitrogen 16.5      Creatinine 1.21 (*)     GFR Estimate 76      Calcium 9.5      Chloride 103      Glucose 99      Alkaline Phosphatase 78      AST 11      ALT 17      Protein Total 7.1      Albumin 4.1      Bilirubin Total 0.3     CBC WITH PLATELETS AND DIFFERENTIAL - Abnormal    WBC Count 13.8 (*)     RBC Count 4.06 (*)     Hemoglobin 12.4 (*)     Hematocrit 36.1 (*)     MCV 89      MCH 30.5      MCHC 34.3      RDW 12.8      Platelet Count 270      % Neutrophils 76      % Lymphocytes 17      % Monocytes 6      % Eosinophils 1      % Basophils 0      % Immature Granulocytes 0      NRBCs per 100 WBC 0      Absolute Neutrophils 10.4 (*)     Absolute Lymphocytes 2.4      Absolute Monocytes 0.8      Absolute Eosinophils 0.1      Absolute Basophils 0.0      Absolute Immature  Granulocytes 0.1      Absolute NRBCs 0.0     LACTIC ACID WHOLE BLOOD WITH 1X REPEAT IN 2 HR WHEN >2 - Normal    Lactic Acid, Initial 1.0     BLOOD CULTURE   BLOOD CULTURE       Imaging   No orders to display       EKG       Independent Interpretation   None    ED Course      Medications Administered   Medications - No data to display    Procedures   Procedures     Discussion of Management   See ED course.     ED Course   ED Course as of 05/24/25 0020   Fri May 23, 2025   2100 I obtained history and examined the patient as noted above.     2127 Saw patient with Dr. Alston.    2204 I spoke with Dr. Vela, Orthopedics, regarding the patient's history and presentation in the emergency department today.  Recommendation to stop twice daily wet-to-dry dressing.  Placed a aquacel dressing.  Follow-up on Tuesday.     2253 I reassessed the patient        Additional Documentation  None    Medical Decision Making / Diagnosis     CMS Diagnoses: None    MIPS   None        MDM   Kimo Greenwood is a 44 year old male who presents for wound infection.  Vitals are stable.  Broad differential was considered include not limited to cellulitis, abscess, surgical site infection, sepsis etc.  CBC does show mild leukocytosis 13.8 but this appears to be chronic.  No significant anemia.  BMP without acute electrolyte, metabolic or renal dysfunction.  Lactate is normal not concerning for severe sepsis or septic shock.  Wound does not appear to be actively infected without surrounding erythema, warmth or induration.  We did consult with orthopedics who felt this was consistent with ongoing wound healing and did not think that admission or antibiotics were indicated.  Patient will be followed up with primary  orthopedic surgeon on Tuesday.  Patient agrees with this plan.  Dressing referred recommendation of surgery was applied.  Questions were answered and patient and mother felt comfortable this plan.    Disposition   The patient was  discharged.     Diagnosis     ICD-10-CM    1. Postoperative wound dehiscence, initial encounter  T81.31XA            Discharge Medications   Discharge Medication List as of 5/23/2025 10:52 PM            Scribe Disclosure:  I, Sebas Shay, am serving as a scribe at 9:02 PM on 5/23/2025 to document services personally performed by Taylor Alston MD based on my observations and the provider's statements to me.        Taylor Alston MD  05/24/25 0022

## 2025-05-24 NOTE — ED NOTES
After Visit Summary was reviewed with patient and parent. Patient and parent verbalized understanding of instructions, was recommended follow up and was given an opportunity to ask questions. Patient appears stable with independent ambulation with use of cane, exited ED with mother.

## 2025-05-24 NOTE — ED TRIAGE NOTES
Pt had right hip surgery 4/15. Pt reports the incision popped open a few weeks ago. Pt reports the area is red and draining, opening up more. Pt did go to [facility] and was sent to the ER

## 2025-05-24 NOTE — DISCHARGE INSTRUCTIONS
Stop twice daily dressing.  We will apply this dressing that should be removed at your outpatient follow-up appointment on Tuesday.      Please return to the if having worsening pain, fevers, chills or other concerns as this may indicate a wound infection.

## 2025-05-27 ENCOUNTER — TELEPHONE (OUTPATIENT)
Dept: FAMILY MEDICINE | Facility: CLINIC | Age: 45
End: 2025-05-27

## 2025-05-27 ENCOUNTER — OFFICE VISIT (OUTPATIENT)
Dept: FAMILY MEDICINE | Facility: CLINIC | Age: 45
End: 2025-05-27
Payer: COMMERCIAL

## 2025-05-27 VITALS
OXYGEN SATURATION: 100 % | WEIGHT: 315 LBS | RESPIRATION RATE: 17 BRPM | HEART RATE: 70 BPM | TEMPERATURE: 97.8 F | HEIGHT: 72 IN | DIASTOLIC BLOOD PRESSURE: 81 MMHG | SYSTOLIC BLOOD PRESSURE: 137 MMHG | BODY MASS INDEX: 42.66 KG/M2

## 2025-05-27 DIAGNOSIS — T81.31XD POSTOPERATIVE WOUND DEHISCENCE, SUBSEQUENT ENCOUNTER: Primary | ICD-10-CM

## 2025-05-27 PROCEDURE — 3079F DIAST BP 80-89 MM HG: CPT | Performed by: PHYSICIAN ASSISTANT

## 2025-05-27 PROCEDURE — 3075F SYST BP GE 130 - 139MM HG: CPT | Performed by: PHYSICIAN ASSISTANT

## 2025-05-27 PROCEDURE — 99213 OFFICE O/P EST LOW 20 MIN: CPT | Performed by: PHYSICIAN ASSISTANT

## 2025-05-27 RX ORDER — CEPHALEXIN 500 MG/1
500 CAPSULE ORAL 4 TIMES DAILY
COMMUNITY
Start: 2025-05-09 | End: 2025-05-27

## 2025-05-27 ASSESSMENT — PATIENT HEALTH QUESTIONNAIRE - PHQ9
SUM OF ALL RESPONSES TO PHQ QUESTIONS 1-9: 12
SUM OF ALL RESPONSES TO PHQ QUESTIONS 1-9: 12
10. IF YOU CHECKED OFF ANY PROBLEMS, HOW DIFFICULT HAVE THESE PROBLEMS MADE IT FOR YOU TO DO YOUR WORK, TAKE CARE OF THINGS AT HOME, OR GET ALONG WITH OTHER PEOPLE: EXTREMELY DIFFICULT

## 2025-05-27 NOTE — TELEPHONE ENCOUNTER
Received call back from patient's sister. Patient's sister states patient does have appointment coming up with [facility] but he was not able to be seen today so appointment with Ursula Perry was scheduled. They wish to keep the appointment today to have incision looked at and will also keep appointment at [facility].  Crystal Green RN   [facility]

## 2025-05-27 NOTE — TELEPHONE ENCOUNTER
RN spoke to patient's sister Co Guardian Traci Aviles was aware of the ER visit     Traci was NOT aware that this visit was scheduled in clinic @ 1:11 today     Traci will call the care team that is assisting patient with his cares to discuss as they likely scheduled this visit     RN advised that FELIX Delgado  is happy to see patient as scheduled, however we may just refer back to [facility] as directed at the ER discharge     Traci sister Co Guardian will call care team now   If any new symptoms, fever, concerns for worsening infection they are to call [facility] right away and then call to cancel this appt     Traci will call back to speak to triage with any new information and to cancel appt / update us on plan     Nancy Orantes, Registered Nurse  [facility]

## 2025-05-27 NOTE — PROGRESS NOTES
Assessment & Plan     Postoperative wound dehiscence, subsequent encounter  Does appear to be improving. DuoDerm placed with clear covering over top.  Photo taken today. I would not recommend further oral antibiotics at this time.        MED REC REQUIRED  Post Medication Reconciliation Status:  Discharge medications reconciled, continue medications without change    Review of external notes as documented elsewhere in note  Review of the result(s) of each unique test - Labs from the ER visit  21 minutes spent by me on the date of the encounter doing chart review, history and exam, documentation and further activities per the note    Subjective   Willard is a 44 year old, presenting for the following health issues:  Emergency Department        5/27/2025     2:16 PM   Additional Questions   Roomed by Sera ALEXANDER   Accompanied by Care coordinator     \[facility] [place] [facility]\""        ED/UC Followup:    Facility:  [facility]  Date of visit: 05/23/2025  Reason for visit: Postoperative wound dehiscence   Current Status: good    Patient is a 44 year old male who presents today for follow up from the ER. The patient was seen and evaluated in the ER on 5/23/25 due to evaluation of a surgical incision that was not healing. Patient had a total hip replacement 4/14/25. There was concern for possible infection of the incision during the surgery follow up appointment and thus he was sent to the ER. He had CBC, CMP and lactic acid obtained. He has a chronically high white count but otherwise labs grossly normal. He was treated with cephalexin at one time for this incision. He is feeling well but to be cautious he wanted to come in for recheck since unable to see surgery team today (had something scheduled but scheduling miscommunication occurred).          Objective    /81 (BP Location: Left arm, Patient Position: Chair, Cuff Size: Adult Regular)   Pulse 70   Temp 97.8  F (36.6  C) (Oral)   Resp 17   Ht 1.829 m (6')   Wt (!) 154.7 kg (341 lb)    SpO2 100%   BMI 46.25 kg/m    Body mass index is 46.25 kg/m .  Physical Exam   GENERAL: No acute distress  HEENT: Normocephalic  SKIN: Right lateral hip with healing incision, inferior aspect of the incision with open wound about 2 cm in size, granulation tissue present without significant surrounding erythema.  NEURO: Alert and non-focal          Signed Electronically by: Ursula Perry PA-C    Answers submitted by the patient for this visit:  Patient Health Questionnaire (Submitted on 5/27/2025)  If you checked off any problems, how difficult have these problems made it for you to do your work, take care of things at home, or get along with other people?: Extremely difficult  PHQ9 TOTAL SCORE: 12

## 2025-05-27 NOTE — TELEPHONE ENCOUNTER
Based on ER note patient was to follow up with orthopedics. I am happy to see him today but perhaps orthopedics is more appropriate.

## 2025-05-28 ENCOUNTER — ANCILLARY PROCEDURE (OUTPATIENT)
Dept: GENERAL RADIOLOGY | Facility: CLINIC | Age: 45
End: 2025-05-28
Attending: PHYSICIAN ASSISTANT
Payer: COMMERCIAL

## 2025-05-28 ENCOUNTER — OFFICE VISIT (OUTPATIENT)
Dept: URGENT CARE | Facility: URGENT CARE | Age: 45
End: 2025-05-28
Payer: COMMERCIAL

## 2025-05-28 VITALS
BODY MASS INDEX: 46.25 KG/M2 | DIASTOLIC BLOOD PRESSURE: 81 MMHG | HEART RATE: 66 BPM | TEMPERATURE: 98.1 F | WEIGHT: 315 LBS | OXYGEN SATURATION: 100 % | RESPIRATION RATE: 20 BRPM | SYSTOLIC BLOOD PRESSURE: 118 MMHG

## 2025-05-28 DIAGNOSIS — M10.072 ACUTE IDIOPATHIC GOUT OF LEFT FOOT: Primary | ICD-10-CM

## 2025-05-28 DIAGNOSIS — R22.42 LOCALIZED SWELLING OF LEFT FOOT: ICD-10-CM

## 2025-05-28 LAB
BASOPHILS # BLD AUTO: 0 10E3/UL (ref 0–0.2)
BASOPHILS NFR BLD AUTO: 0 %
EOSINOPHIL # BLD AUTO: 0.2 10E3/UL (ref 0–0.7)
EOSINOPHIL NFR BLD AUTO: 1 %
ERYTHROCYTE [DISTWIDTH] IN BLOOD BY AUTOMATED COUNT: 12.7 % (ref 10–15)
HCT VFR BLD AUTO: 38.3 % (ref 40–53)
HGB BLD-MCNC: 12.3 G/DL (ref 13.3–17.7)
IMM GRANULOCYTES # BLD: 0 10E3/UL
IMM GRANULOCYTES NFR BLD: 0 %
LYMPHOCYTES # BLD AUTO: 2 10E3/UL (ref 0.8–5.3)
LYMPHOCYTES NFR BLD AUTO: 15 %
MCH RBC QN AUTO: 29.7 PG (ref 26.5–33)
MCHC RBC AUTO-ENTMCNC: 32.1 G/DL (ref 31.5–36.5)
MCV RBC AUTO: 93 FL (ref 78–100)
MONOCYTES # BLD AUTO: 0.6 10E3/UL (ref 0–1.3)
MONOCYTES NFR BLD AUTO: 5 %
NEUTROPHILS # BLD AUTO: 10 10E3/UL (ref 1.6–8.3)
NEUTROPHILS NFR BLD AUTO: 78 %
PLATELET # BLD AUTO: 214 10E3/UL (ref 150–450)
RBC # BLD AUTO: 4.14 10E6/UL (ref 4.4–5.9)
WBC # BLD AUTO: 12.8 10E3/UL (ref 4–11)

## 2025-05-28 PROCEDURE — 85025 COMPLETE CBC W/AUTO DIFF WBC: CPT | Performed by: PHYSICIAN ASSISTANT

## 2025-05-28 PROCEDURE — 1125F AMNT PAIN NOTED PAIN PRSNT: CPT | Performed by: PHYSICIAN ASSISTANT

## 2025-05-28 PROCEDURE — 80061 LIPID PANEL: CPT | Performed by: PHYSICIAN ASSISTANT

## 2025-05-28 PROCEDURE — 3074F SYST BP LT 130 MM HG: CPT | Performed by: PHYSICIAN ASSISTANT

## 2025-05-28 PROCEDURE — 99214 OFFICE O/P EST MOD 30 MIN: CPT | Performed by: PHYSICIAN ASSISTANT

## 2025-05-28 PROCEDURE — 73630 X-RAY EXAM OF FOOT: CPT | Mod: TC | Performed by: RADIOLOGY

## 2025-05-28 PROCEDURE — 80053 COMPREHEN METABOLIC PANEL: CPT | Performed by: PHYSICIAN ASSISTANT

## 2025-05-28 PROCEDURE — 3079F DIAST BP 80-89 MM HG: CPT | Performed by: PHYSICIAN ASSISTANT

## 2025-05-28 RX ORDER — INDOMETHACIN 50 MG/1
50 CAPSULE ORAL
Qty: 15 CAPSULE | Refills: 0 | Status: SHIPPED | OUTPATIENT
Start: 2025-05-28

## 2025-05-28 ASSESSMENT — PAIN SCALES - GENERAL: PAINLEVEL_OUTOF10: SEVERE PAIN (10)

## 2025-05-28 NOTE — PATIENT INSTRUCTIONS
Start taking indomethacin 50 mg 3 times daily for 24 to 48 hours, reduce dose as symptoms improve for the next several days  Rest and elevate your foot    If having fever, chills, worsening swelling or redness, numbness / tingling etc please follow-up right away

## 2025-05-28 NOTE — PROGRESS NOTES
Urgent Care Clinic Visit    Chief Complaint   Patient presents with    Urgent Care     LEFT FOOT AND TOE PAIN X 2 DAYS- NO KNOWN INJURY               5/28/2025     2:03 PM   Additional Questions   Roomed by TARIQ ORTA

## 2025-05-29 LAB
ALBUMIN SERPL BCG-MCNC: 3.9 G/DL (ref 3.5–5.2)
ALP SERPL-CCNC: 81 U/L (ref 40–150)
ALT SERPL W P-5'-P-CCNC: 15 U/L (ref 0–70)
ANION GAP SERPL CALCULATED.3IONS-SCNC: 15 MMOL/L (ref 7–15)
AST SERPL W P-5'-P-CCNC: 25 U/L (ref 0–45)
BACTERIA SPEC CULT: NO GROWTH
BILIRUB SERPL-MCNC: 0.3 MG/DL
BUN SERPL-MCNC: 15.7 MG/DL (ref 6–20)
CALCIUM SERPL-MCNC: 9.7 MG/DL (ref 8.8–10.4)
CHLORIDE SERPL-SCNC: 103 MMOL/L (ref 98–107)
CHOLEST SERPL-MCNC: 168 MG/DL
CREAT SERPL-MCNC: 1.14 MG/DL (ref 0.67–1.17)
EGFRCR SERPLBLD CKD-EPI 2021: 82 ML/MIN/1.73M2
FASTING STATUS PATIENT QL REPORTED: NO
FASTING STATUS PATIENT QL REPORTED: NO
GLUCOSE SERPL-MCNC: 73 MG/DL (ref 70–99)
HCO3 SERPL-SCNC: 23 MMOL/L (ref 22–29)
HDLC SERPL-MCNC: 40 MG/DL
LDLC SERPL CALC-MCNC: 118 MG/DL
NONHDLC SERPL-MCNC: 128 MG/DL
POTASSIUM SERPL-SCNC: 4.8 MMOL/L (ref 3.4–5.3)
PROT SERPL-MCNC: 6.8 G/DL (ref 6.4–8.3)
SODIUM SERPL-SCNC: 141 MMOL/L (ref 135–145)
TRIGL SERPL-MCNC: 51 MG/DL

## 2025-06-05 ENCOUNTER — OFFICE VISIT (OUTPATIENT)
Dept: FAMILY MEDICINE | Facility: CLINIC | Age: 45
End: 2025-06-05
Payer: COMMERCIAL

## 2025-06-05 VITALS
OXYGEN SATURATION: 98 % | WEIGHT: 315 LBS | SYSTOLIC BLOOD PRESSURE: 138 MMHG | RESPIRATION RATE: 20 BRPM | HEIGHT: 72 IN | BODY MASS INDEX: 42.66 KG/M2 | HEART RATE: 79 BPM | TEMPERATURE: 97.5 F | DIASTOLIC BLOOD PRESSURE: 86 MMHG

## 2025-06-05 DIAGNOSIS — M10.072 ACUTE IDIOPATHIC GOUT OF LEFT FOOT: ICD-10-CM

## 2025-06-05 DIAGNOSIS — G47.33 OSA (OBSTRUCTIVE SLEEP APNEA): ICD-10-CM

## 2025-06-05 DIAGNOSIS — M25.562 CHRONIC PAIN OF LEFT KNEE: ICD-10-CM

## 2025-06-05 DIAGNOSIS — E66.813 CLASS 3 SEVERE OBESITY WITHOUT SERIOUS COMORBIDITY WITH BODY MASS INDEX (BMI) OF 45.0 TO 49.9 IN ADULT, UNSPECIFIED OBESITY TYPE (H): ICD-10-CM

## 2025-06-05 DIAGNOSIS — G89.29 CHRONIC PAIN OF LEFT KNEE: ICD-10-CM

## 2025-06-05 DIAGNOSIS — Z01.818 PREOP GENERAL PHYSICAL EXAM: Primary | ICD-10-CM

## 2025-06-05 LAB
BASOPHILS # BLD AUTO: 0 10E3/UL (ref 0–0.2)
BASOPHILS NFR BLD AUTO: 0 %
EOSINOPHIL # BLD AUTO: 0.2 10E3/UL (ref 0–0.7)
EOSINOPHIL NFR BLD AUTO: 2 %
ERYTHROCYTE [DISTWIDTH] IN BLOOD BY AUTOMATED COUNT: 12.2 % (ref 10–15)
HCT VFR BLD AUTO: 38.1 % (ref 40–53)
HGB BLD-MCNC: 12.2 G/DL (ref 13.3–17.7)
IMM GRANULOCYTES # BLD: 0 10E3/UL
IMM GRANULOCYTES NFR BLD: 0 %
LYMPHOCYTES # BLD AUTO: 2.2 10E3/UL (ref 0.8–5.3)
LYMPHOCYTES NFR BLD AUTO: 19 %
MCH RBC QN AUTO: 28.9 PG (ref 26.5–33)
MCHC RBC AUTO-ENTMCNC: 32 G/DL (ref 31.5–36.5)
MCV RBC AUTO: 90 FL (ref 78–100)
MONOCYTES # BLD AUTO: 0.7 10E3/UL (ref 0–1.3)
MONOCYTES NFR BLD AUTO: 6 %
NEUTROPHILS # BLD AUTO: 8.1 10E3/UL (ref 1.6–8.3)
NEUTROPHILS NFR BLD AUTO: 72 %
PLATELET # BLD AUTO: 290 10E3/UL (ref 150–450)
RBC # BLD AUTO: 4.22 10E6/UL (ref 4.4–5.9)
WBC # BLD AUTO: 11.2 10E3/UL (ref 4–11)

## 2025-06-05 PROCEDURE — 84550 ASSAY OF BLOOD/URIC ACID: CPT | Performed by: FAMILY MEDICINE

## 2025-06-05 PROCEDURE — 3075F SYST BP GE 130 - 139MM HG: CPT | Performed by: FAMILY MEDICINE

## 2025-06-05 PROCEDURE — 80053 COMPREHEN METABOLIC PANEL: CPT | Performed by: FAMILY MEDICINE

## 2025-06-05 PROCEDURE — 93000 ELECTROCARDIOGRAM COMPLETE: CPT | Performed by: FAMILY MEDICINE

## 2025-06-05 PROCEDURE — 36415 COLL VENOUS BLD VENIPUNCTURE: CPT | Performed by: FAMILY MEDICINE

## 2025-06-05 PROCEDURE — 3079F DIAST BP 80-89 MM HG: CPT | Performed by: FAMILY MEDICINE

## 2025-06-05 PROCEDURE — 86140 C-REACTIVE PROTEIN: CPT | Performed by: FAMILY MEDICINE

## 2025-06-05 PROCEDURE — 99214 OFFICE O/P EST MOD 30 MIN: CPT | Performed by: FAMILY MEDICINE

## 2025-06-05 PROCEDURE — 85025 COMPLETE CBC W/AUTO DIFF WBC: CPT | Performed by: FAMILY MEDICINE

## 2025-06-05 RX ORDER — INDOMETHACIN 50 MG/1
50 CAPSULE ORAL
Qty: 9 CAPSULE | Refills: 0 | Status: SHIPPED | OUTPATIENT
Start: 2025-06-05

## 2025-06-05 RX ORDER — INDOMETHACIN 50 MG/1
50 CAPSULE ORAL
Qty: 9 CAPSULE | Refills: 0 | Status: SHIPPED | OUTPATIENT
Start: 2025-06-05 | End: 2025-06-05

## 2025-06-05 ASSESSMENT — PATIENT HEALTH QUESTIONNAIRE - PHQ9
SUM OF ALL RESPONSES TO PHQ QUESTIONS 1-9: 12
10. IF YOU CHECKED OFF ANY PROBLEMS, HOW DIFFICULT HAVE THESE PROBLEMS MADE IT FOR YOU TO DO YOUR WORK, TAKE CARE OF THINGS AT HOME, OR GET ALONG WITH OTHER PEOPLE: SOMEWHAT DIFFICULT
SUM OF ALL RESPONSES TO PHQ QUESTIONS 1-9: 12

## 2025-06-05 NOTE — PROGRESS NOTES
Preoperative Evaluation  [facility]  [address] [facility] [address]  Phone: [phone]  Primary Provider: Ciro Love MD  Pre-op Performing Provider: Louise Villalobos MD  Jun 5, 2025 6/5/2025   Surgical Information   What procedure is being done? Left Knee replacement   Facility or Hospital where procedure/surgery will be performed: [facility]   Who is doing the procedure / surgery? Dr. Moreno KLINE   Date of surgery / procedure: 6/16/25   Time of surgery / procedure: Pending   Where do you plan to recover after surgery? at home with Home Care     Fax number for surgical facility: Note does not need to be faxed, will be available electronically in Epic.    Assessment & Plan     The proposed surgical procedure is considered INTERMEDIATE risk.    (Z01.818) Preop general physical exam  (primary encounter diagnosis)  Comment: undergoing intermediate risk Left knee replacement surgery .   Physical activity equal to 4 mets     Low perioperative cardiac risk .     Plan: EKG 12-lead complete w/read - Clinics, CBC with        platelets and differential, Comprehensive         metabolic panel (BMP + Alb, Alk Phos, ALT, AST,        Total. Bili, TP), CRP, inflammation          Zepbound on hold for procedure .     Mildly elevated white cell count , no concern of active infection,   Off and on increased white cell count . Seen by the hematology in the past .        History of right hip replacement surgery wound healed no active infection .         (M25.562,  G89.29) Chronic pain of left knee  Comment: Undergoing surgery for         (G47.33) LOREN (obstructive sleep apnea)  Comment: has Cpap machine .    (M10.072) Acute idiopathic gout of left foot  Comment: gout left foot , improving ,   Will prescribe indomethacin for 3 more days     Explained Gout need to be cleared up prior to knee replacement surgery     Plan: Uric acid, indomethacin (INDOCIN) 50 MG          capsule,     Addendum - gout left ankle now cleared .  Recommend to start allopurinol 2 weeks after surgery .           Obesity working on weight loss .       - No identified additional risk factors other than previously addressed        Recommendation  Approval given to proceed with proposed procedure, without further diagnostic evaluation.      Carline Lamar is a 44 year old, presenting for the following:  Pre-Op Exam (- Left Total Knee Arthroplasty/- [facility]/- June 16th, 2025)          6/5/2025     2:21 PM   Additional Questions   Roomed by CINDY Burciaga   Accompanied by Care Coordinator - Jose     HPI:  History of Present Illness:     Complaining of pain in left ankle , swelling of left foot .           6/5/2025   Pre-Op Questionnaire   Have you ever had a heart attack or stroke? No   Have you ever had surgery on your heart or blood vessels, such as a stent placement, a coronary artery bypass, or surgery on an artery in your head, neck, heart, or legs? No   Do you have chest pain with activity? No   Do you have a history of heart failure? No   Do you currently have a cold, bronchitis or symptoms of other infection? No   Do you have a cough, shortness of breath, or wheezing? No   Do you or anyone in your family have previous history of blood clots? No   Do you or does anyone in your family have a serious bleeding problem such as prolonged bleeding following surgeries or cuts? No   Have you ever had problems with anemia or been told to take iron pills? (!) YES    Have you had any abnormal blood loss such as black, tarry or bloody stools? No   Have you ever had a blood transfusion? No   Are you willing to have a blood transfusion if it is medically needed before, during, or after your surgery? Yes   Have you or any of your relatives ever had problems with anesthesia? No   Do you have sleep apnea, excessive snoring or daytime drowsiness? (!) YES   Do you have a CPAP machine? Yes    Do you have any artifical heart valves or other implanted medical devices like a pacemaker, defibrillator, or continuous glucose monitor? (!) YES   What type of device do you have? Innerstem for urine control   Name of the clinic that manages your device [facility]   Do you have artificial joints? (!) YES   Are you allergic to latex? No           Patient Active Problem List    Diagnosis Date Noted    S/P total right hip arthroplasty 04/14/2025     Priority: Medium    Mood change 04/25/2022     Priority: Medium    Severe recurrent major depressive disorder with psychotic features (H) 10/06/2021     Priority: Medium    Alcohol use disorder, mild, abuse 10/06/2021     Priority: Medium    Cannabis use disorder, mild, abuse 10/06/2021     Priority: Medium    Venous stasis dermatitis of both lower extremities 08/27/2021     Priority: Medium    Duodenitis 09/30/2020     Priority: Medium     Added automatically from request for surgery [id number]      Somatic dysfunction of lumbar region 10/13/2019     Priority: Medium    Somatic dysfunction of sacral region 10/13/2019     Priority: Medium    Sacral pain 10/13/2019     Priority: Medium    Thoracic segment dysfunction 10/13/2019     Priority: Medium    Hip pain, left 10/13/2019     Priority: Medium    Mild intermittent asthma 05/08/2017     Priority: Medium    Patel's esophagus determined by biopsy 03/27/2017     Priority: Medium     Next upper GI endoscopy (EGD) 11/2019      Vitamin D deficiency 03/20/2017     Priority: Medium    LPRD (laryngopharyngeal reflux disease) 03/20/2017     Priority: Medium    S/P total hip arthroplasty 01/25/2017     Priority: Medium    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     Priority: Medium    History of colonic polyps 09/28/2016     Priority: Medium     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 None 11/2016. Next colonoscopy 11/2021 if not sooner.      Overactive bladder 11/06/2015     Priority: Medium    Bilateral low  back pain with left-sided sciatica 10/08/2015     Priority: Medium    Chronic low back pain 10/06/2015     Priority: Medium    Suicidal ideation 08/23/2015     Priority: Medium    Leukocytosis 06/09/2014     Priority: Medium    Class 3 severe obesity with serious comorbidity and body mass index (BMI) of 50.0 to 59.9 in adult (H) 05/20/2014     Priority: Medium    Essential hypertension 02/03/2014     Priority: Medium    LOREN (obstructive sleep apnea) 04/23/2013     Priority: Medium     CPAP      Moderate major depression (H) 01/15/2013     Priority: Medium    Speech/language delay 11/11/2010     Priority: Medium    Tobacco use disorder 11/11/2010     Priority: Medium    Nocturnal enuresis      Priority: Medium      Past Medical History:   Diagnosis Date    Arthritis     DDD hips and knees    Asthma     Asthma     Patel's esophagus     Chronic pain     Chronic pain - left hip/leg pain     degenerative disc disease, back, hips, knees    Gastroesophageal reflux disease     History of anesthesia complications     agitation x1 after interstim 2013    Hypertension     Hypertension     Leukocytosis     LPRD (laryngopharyngeal reflux disease)     Marijuana abuse     Marijuana abuse     MDD (major depressive disorder)     MDD (major depressive disorder)     Mental retardation, mild (I.Q. 50-70)     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    With associated speech/language delay    Obesity 11/11/2010    LOREN (obstructive sleep apnea)     Uses a CPAP    LOREN (obstructive sleep apnea)     Seborrheic dermatitis     Seborrheic dermatitis     Sleep apnea     CPAP     Past Surgical History:   Procedure Laterality Date    ARTHROPLASTY HIP Left 01/25/2017    Procedure: ARTHROPLASTY HIP;  Surgeon: Bala Randall MD;  Location: [facility] OR    ARTHROPLASTY HIP Left     ARTHROPLASTY HIP Right 04/14/2025    Procedure: Right total hip arthroplasty;  Surgeon: Bala Randall MD;  Location: [facility] OR    ARTHROPLASTY  REVISION HIP Left 06/24/2019    Procedure: Revision left total hip arthroplasty with a head and liner exchange to a Biomet dual mobility head and liner.;  Surgeon: Bala Randall MD;  Location: [facility] OR    BACK SURGERY      BLADDER SURGERY      Ibarra, [facility],Interstem stimulator implant    CLOSED REDUCTION HIP Left 06/10/2019    Procedure: Closed reduction under general anesthesia left recurrent prosthetic hip dislocation;  Surgeon: Rafat Cazares MD;  Location: [facility] OR    COLONOSCOPY N/A 10/30/2015    Procedure: COMBINED COLONOSCOPY, SINGLE OR MULTIPLE BIOPSY/POLYPECTOMY BY BIOPSY;  Surgeon: Alexis Jackson MD;  Location: [facility] GI    COLONOSCOPY N/A 11/17/2016    Procedure: COMBINED COLONOSCOPY, SINGLE OR MULTIPLE BIOPSY/POLYPECTOMY BY BIOPSY;  Surgeon: Cat Huber MD;  [facility]    COLONOSCOPY N/A 10/28/2020    Procedure: COLONOSCOPY;  Surgeon: You Kraus MD;  Location: [facility] OR    COLONOSCOPY      ESOPHAGOSCOPY, GASTROSCOPY, DUODENOSCOPY (EGD), COMBINED N/A 11/17/2016    Procedure: COMBINED ESOPHAGOSCOPY, GASTROSCOPY, DUODENOSCOPY (EGD), BIOPSY SINGLE OR MULTIPLE;  Surgeon: Cat Huber MD;  [facility]: [facility]    ESOPHAGOSCOPY, GASTROSCOPY, DUODENOSCOPY (EGD), COMBINED N/A 03/12/2020    Procedure: ESOPHAGOGASTRODUODENOSCOPY WITH BIOPSIES;  Surgeon: You Kraus MD;  Location: [facility] OR    ESOPHAGOSCOPY, GASTROSCOPY, DUODENOSCOPY (EGD), COMBINED N/A 10/28/2020    Procedure: ESOPHAGOGASTRODUODENOSCOPY, WITH BIOPSY;  Surgeon: You Kraus MD;  Location: [facility] OR    ESOPHAGOSCOPY, GASTROSCOPY, DUODENOSCOPY (EGD), COMBINED      ESOPHAGOSCOPY, GASTROSCOPY, DUODENOSCOPY (EGD), COMBINED N/A 11/29/2023    Procedure: Esophagoscopy, gastroscopy, duodenoscopy (EGD), combined with biopsies using forceps;  Surgeon: You Kraus MD;  Location:  GI    EXAM UNDER ANESTHESIA, FULGURATE CONDYLOMA ANUS, COMBINED N/A 12/22/2016    Procedure: COMBINED EXAM UNDER  ANESTHESIA, FULGURATE CONDYLOMA ANUS;  Surgeon: Nya Cabello MD;  [facility]: [facility]    GENITOURINARY SURGERY      JOINT REPLACEMENT Left 01/01/2017    MARGO, Revision Left MARGO    OTHER SURGICAL HISTORY      interstim stimulator implant    TN CYSTOURETHROSCOPY INJ CHEMODENERVATION BLADDER N/A 01/16/2019    Procedure: CYSTOSCOPY BOTOX BLADDER INJECTIONS (100 UNITS IN 10CC);  Surgeon: Didier Ibarra MD;  Location: [facility] OR;  Service: Urology    TN IMPLANT PERIPH/GASTRIC NEUROSTIM/ N/A 01/08/2020    Procedure: NEW INTERSTIM LEAD, REPLACE OLD; NEW INTERSTIM BATTERY, REPLACE OLD;  Surgeon: Didier Ibarra MD;  Location: [facility] OR;  Service: Urology     Current Outpatient Medications   Medication Sig Dispense Refill    calcium polycarbophil (FIBER-LAX) 625 MG tablet Take 1 tablet (625 mg) by mouth daily. 90 tablet 3    chlorhexidine (PERIDEX) 0.12 % solution       DULoxetine (CYMBALTA) 60 MG capsule Take 120 mg by mouth daily       econazole nitrate 1 % external cream as needed.      ferrous sulfate (FEROSUL) 325 (65 Fe) MG tablet TAKE 1 TABLET BY MOUTH DAILY WITH BREAKFAST 28 tablet 11    hydrochlorothiazide (HYDRODIURIL) 25 MG tablet TAKE 1 TABLET BY MOUTH DAILY 90 tablet 3    hydrocortisone 2.5 % cream 2 times daily as needed.      ketoconazole (NIZORAL) 2 % external cream 2 times daily as needed.      mirabegron (MYRBETRIQ) 50 MG 24 hr tablet Take 1 tablet by mouth daily      nystatin-triamcinolone (MYCOLOG II) 618336-3.1 UNIT/GM-% external cream Apply topically 2 times daily 60 g 0    OLANZapine (ZYPREXA) 15 MG tablet       oxyBUTYnin ER (DITROPAN XL) 15 MG 24 hr tablet Take 2 tablets (30 mg) by mouth daily 60 tablet 1    pantoprazole (PROTONIX) 40 MG EC tablet TAKE 1 TABLET BY MOUTH ONCE DAILY 30-60 MINUTE(S) BEFORE FIRST MEAL OF THE DAY 28 tablet 10    prazosin (MINIPRESS) 1 MG capsule Take 3 mg by mouth At Bedtime      tirzepatide-Weight Management (ZEPBOUND) 12.5 MG/0.5ML prefilled  pen Inject 0.5 mLs (12.5 mg) subcutaneously every 7 days. 6 mL 1    indomethacin (INDOCIN) 50 MG capsule Take 1 capsule (50 mg) by mouth 3 times daily (with meals). 15 capsule 0    LYBALVI 15-10 MG TABS tablet       potassium chloride tay ER (KLOR-CON M20) 20 MEQ CR tablet          Allergies   Allergen Reactions    Other [No Clinical Screening - See Comments] Other (See Comments)     Awoke from anesthesia with anger and ripping off dressing, after interstim placement, outside hospital         Social History     Tobacco Use    Smoking status: Former     Current packs/day: 0.00     Average packs/day: 1 pack/day for 1 year (1.0 ttl pk-yrs)     Types: Cigarettes, Other     Quit date: 2025     Years since quittin.2     Passive exposure: Current    Smokeless tobacco: Former     Types: Chew     Quit date: 2025    Tobacco comments:     Switched to an ecig with 0% nicotine. Current Marijuana Used.    Substance Use Topics    Alcohol use: Yes     Comment: socially     Family History   Problem Relation Age of Onset    Anxiety Disorder Mother     Depression Mother     Ulcerative Colitis Mother 18    Breast Cancer Maternal Grandmother     Cerebrovascular Disease Maternal Grandmother     Breast Cancer Maternal Aunt     Cancer Maternal Grandfather         grt uncles colon cancer     History   Drug Use    Types: Marijuana     Comment: winsome             Review of Systems  CONSTITUTIONAL: NEGATIVE for fever, chills, change in weight  INTEGUMENTARY/SKIN: NEGATIVE for worrisome rashes, moles or lesions  EYES: NEGATIVE for vision changes or irritation  ENT/MOUTH: NEGATIVE for ear, mouth and throat problems  RESP: NEGATIVE for significant cough or SOB  BREAST: NEGATIVE for masses, tenderness or discharge  CV: NEGATIVE for chest pain, palpitations or peripheral edema  GI: NEGATIVE for nausea, abdominal pain, heartburn, or change in bowel habits  : NEGATIVE for frequency, dysuria, or hematuria  MUSCULOSKELETAL:  NEGATIVE for significant arthralgias or myalgia  NEURO: NEGATIVE for weakness, dizziness or paresthesias  ENDOCRINE: NEGATIVE for temperature intolerance, skin/hair changes  HEME: NEGATIVE for bleeding problems  PSYCHIATRIC: NEGATIVE for changes in mood or affect    Objective    /86 (BP Location: Right arm, Patient Position: Sitting, Cuff Size: Adult Large)   Pulse 79   Temp 97.5  F (36.4  C) (Oral)   Resp 20   Ht 1.829 m (6')   Wt (!) 158.8 kg (350 lb)   SpO2 98%   BMI 47.47 kg/m     Estimated body mass index is 47.47 kg/m  as calculated from the following:    Height as of this encounter: 1.829 m (6').    Weight as of this encounter: 158.8 kg (350 lb).  Physical Exam  GENERAL: alert and no distress  EYES: Eyes grossly normal to inspection, PERRL and conjunctivae and sclerae normal  HENT: ear canals and TM's normal, nose and mouth without ulcers or lesions  NECK: no adenopathy, no asymmetry, masses, or scars  RESP: lungs clear to auscultation - no rales, rhonchi or wheezes  CV: regular rate and rhythm, normal S1 S2, no S3 or S4, no murmur, click or rub, no peripheral edema  ABDOMEN: soft, nontender, no hepatosplenomegaly, no masses and bowel sounds normal  MS: no gross musculoskeletal defects noted, no edema  SKIN: no suspicious lesions or rashes  NEURO: Normal strength and tone, mentation intact and speech normal  PSYCH: mentation appears normal, affect normal/bright    Recent Labs   Lab Test 05/28/25  1434 05/23/25  1932   HGB 12.3* 12.4*    270    141   POTASSIUM 4.8 3.5   CR 1.14 1.21*        Diagnostics  Recent Results (from the past 240 hours)   Comprehensive metabolic panel (BMP + Alb, Alk Phos, ALT, AST, Total. Bili, TP)    Collection Time: 06/05/25  3:41 PM   Result Value Ref Range    Sodium 143 135 - 145 mmol/L    Potassium 4.4 3.4 - 5.3 mmol/L    Carbon Dioxide (CO2) 27 22 - 29 mmol/L    Anion Gap 9 7 - 15 mmol/L    Urea Nitrogen 12.4 6.0 - 20.0 mg/dL    Creatinine 1.25 (H) 0.67  - 1.17 mg/dL    GFR Estimate 73 >60 mL/min/1.73m2    Calcium 9.5 8.8 - 10.4 mg/dL    Chloride 107 98 - 107 mmol/L    Glucose 78 70 - 99 mg/dL    Alkaline Phosphatase 75 40 - 150 U/L    AST 13 0 - 45 U/L    ALT 13 0 - 70 U/L    Protein Total 6.7 6.4 - 8.3 g/dL    Albumin 3.8 3.5 - 5.2 g/dL    Bilirubin Total 0.2 <=1.2 mg/dL   Uric acid    Collection Time: 06/05/25  3:41 PM   Result Value Ref Range    Uric Acid 8.6 (H) 3.4 - 7.0 mg/dL   CRP, inflammation    Collection Time: 06/05/25  3:41 PM   Result Value Ref Range    CRP Inflammation 14.30 (H) <5.00 mg/L   CBC with platelets and differential    Collection Time: 06/05/25  3:41 PM   Result Value Ref Range    WBC Count 11.2 (H) 4.0 - 11.0 10e3/uL    RBC Count 4.22 (L) 4.40 - 5.90 10e6/uL    Hemoglobin 12.2 (L) 13.3 - 17.7 g/dL    Hematocrit 38.1 (L) 40.0 - 53.0 %    MCV 90 78 - 100 fL    MCH 28.9 26.5 - 33.0 pg    MCHC 32.0 31.5 - 36.5 g/dL    RDW 12.2 10.0 - 15.0 %    Platelet Count 290 150 - 450 10e3/uL    % Neutrophils 72 %    % Lymphocytes 19 %    % Monocytes 6 %    % Eosinophils 2 %    % Basophils 0 %    % Immature Granulocytes 0 %    Absolute Neutrophils 8.1 1.6 - 8.3 10e3/uL    Absolute Lymphocytes 2.2 0.8 - 5.3 10e3/uL    Absolute Monocytes 0.7 0.0 - 1.3 10e3/uL    Absolute Eosinophils 0.2 0.0 - 0.7 10e3/uL    Absolute Basophils 0.0 0.0 - 0.2 10e3/uL    Absolute Immature Granulocytes 0.0 <=0.4 10e3/uL      EKG: appears normal, NSR, normal axis, normal intervals, no acute ST/T changes c/w ischemia, no LVH by voltage criteria, unchanged from previous tracings    Revised Cardiac Risk Index (RCRI)  The patient has the following serious cardiovascular risks for perioperative complications:   - No serious cardiac risks = 0 points     RCRI Interpretation: 0 points: Class I (very low risk - 0.4% complication rate)         Signed Electronically by: Louise Villalobos MD  A copy of this evaluation report is provided to the requesting physician.

## 2025-06-05 NOTE — PATIENT INSTRUCTIONS
Patient Education   Preparing for Your Surgery  For Adults  Getting started  In most cases, a nurse will call to review your health history and instructions. They will give you an arrival time based on your scheduled surgery time. Please be ready to share:  Your doctor's clinic name and phone number  Your medical, surgical, and anesthesia history  A list of allergies and sensitivities  A list of medicines, including herbal treatments and over-the-counter drugs  Whether the patient has a legal guardian (ask how to send us the papers in advance)  Note: You may not receive a call if you were seen at our PAC (Preoperative Assessment Center).  Please tell us if you're pregnant--or if there's any chance you might be pregnant. Some surgeries may injure a fetus (unborn baby), so they require a pregnancy test. Surgeries that are safe for a fetus don't always need a test, and you can choose whether to have one.   Preparing for surgery  Within 10 to 30 days of surgery: Have a pre-op exam (sometimes called an H&P, or History and Physical). This can be done at a clinic or pre-operative center.  If you're having a , you may not need this exam. Talk to your care team.  At your pre-op exam, talk to your care team about all medicines you take. (This includes CBD oil and any drugs, such as THC, marijuana, and other forms of cannabis.) If you need to stop any medicine before surgery, ask when to start taking it again.  This is for your safety. Many medicines and drugs can make you bleed too much during surgery. Some change how well surgery (anesthesia) drugs work.  Call your insurance company to let them know you're having surgery. (If you don't have insurance, call [phone].)  Call your clinic if there's any change in your health. This includes a scrape or scratch near the surgery site, or any signs of a cold (sore throat, runny nose, cough, rash, fever).  Eating and drinking guidelines  For your safety: Unless your  surgeon tells you otherwise, follow the guidelines below.  Eat and drink as normal until 8 hours before you arrive for surgery. After that, no food or milk. You can spit out gum when you arrive.  Drink clear liquids until 2 hours before you arrive. These are liquids you can see through, like water, Gatorade, and Propel Water. They also include plain black coffee and tea (no cream or milk).  No alcohol for 24 hours before you arrive. The night before surgery, stop any drinks that contain THC.  If your care team tells you to take medicine on the morning of surgery, it's okay to take it with a sip of water. No other medicines or drugs are allowed (including CBD oil)--follow your care team's instructions.  If you have questions the day of surgery, call your hospital or surgery center.   Preventing infection  Shower or bathe the night before and the morning of surgery. Follow the instructions your clinic gave you. (If no instructions, use regular soap.)  Don't shave or clip hair near your surgery site. We'll remove the hair if needed.  Don't smoke or vape the morning of surgery. No chewing tobacco for 6 hours before you arrive. A nicotine patch is okay. You may spit out nicotine gum when you arrive.  For some surgeries, the surgeon will tell you to fully quit smoking and nicotine.  We will make every effort to keep you safe from infection. We will:  Clean our hands often with soap and water (or an alcohol-based hand rub).  Clean the skin at your surgery site with a special soap that kills germs.  Give you a special gown to keep you warm. (Cold raises the risk of infection.)  Wear hair covers, masks, gowns, and gloves during surgery.  Give antibiotic medicine, if prescribed. Not all surgeries need this medicine.  What to bring on the day of surgery  Photo ID and insurance card  Copy of your health care directive, if you have one  Glasses and hearing aids (bring cases)  You can't wear contacts during surgery  Inhaler and  eye drops, if you use them (tell us about these when you arrive)  CPAP machine or breathing device, if you use them  A few personal items, if spending the night  If you have . . .  A pacemaker, ICD (cardiac defibrillator), or other implant: Bring the ID card.  An implanted stimulator: Bring the remote control.  A legal guardian: Bring a copy of the certified (court-stamped) guardianship papers.  Please remove any jewelry, including body piercings. Leave jewelry and other valuables at home.  If you're going home the day of surgery  You must have a support person drive you home. They should stay with you overnight, and they may need to help with your self-care.  If you don't have a support person, please tells us as soon as possible. We can help.  After surgery  If it's hard to control your pain or you need more pain medicine, please call your surgeon's office.  Questions?   If you have any questions for your care team, list them here:   ____________________________________________________________________________________________________________________________________________________________________________________________________________________________________________________________  For informational purposes only. Not to replace the advice of your health care provider. Copyright   2003, 2019 [employer]. All rights reserved. Clinically reviewed by Duane Sharif MD. [employer] [id number] - REV 02/25.

## 2025-06-06 ENCOUNTER — TELEPHONE (OUTPATIENT)
Dept: FAMILY MEDICINE | Facility: CLINIC | Age: 45
End: 2025-06-06
Payer: COMMERCIAL

## 2025-06-06 LAB
ALBUMIN SERPL BCG-MCNC: 3.8 G/DL (ref 3.5–5.2)
ALP SERPL-CCNC: 75 U/L (ref 40–150)
ALT SERPL W P-5'-P-CCNC: 13 U/L (ref 0–70)
ANION GAP SERPL CALCULATED.3IONS-SCNC: 9 MMOL/L (ref 7–15)
AST SERPL W P-5'-P-CCNC: 13 U/L (ref 0–45)
BILIRUB SERPL-MCNC: 0.2 MG/DL
BUN SERPL-MCNC: 12.4 MG/DL (ref 6–20)
CALCIUM SERPL-MCNC: 9.5 MG/DL (ref 8.8–10.4)
CHLORIDE SERPL-SCNC: 107 MMOL/L (ref 98–107)
CREAT SERPL-MCNC: 1.25 MG/DL (ref 0.67–1.17)
CRP SERPL-MCNC: 14.3 MG/L
EGFRCR SERPLBLD CKD-EPI 2021: 73 ML/MIN/1.73M2
GLUCOSE SERPL-MCNC: 78 MG/DL (ref 70–99)
HCO3 SERPL-SCNC: 27 MMOL/L (ref 22–29)
POTASSIUM SERPL-SCNC: 4.4 MMOL/L (ref 3.4–5.3)
PROT SERPL-MCNC: 6.7 G/DL (ref 6.4–8.3)
SODIUM SERPL-SCNC: 143 MMOL/L (ref 135–145)
URATE SERPL-MCNC: 8.6 MG/DL (ref 3.4–7)

## 2025-06-06 NOTE — TELEPHONE ENCOUNTER
Pharmacy states that the medication indomethacin (INDOCIN) 50 MG capsule is not covered. Pharmacy suggest Ibuprofen, Meloxican or Naproxen. Or the patient needs to pay out of pocket.     Order Information    Ordered Status Priority Ordering User Department   06/05/25 Sent (none) Louise Villalobos MD  FAMILY PRACTICE     Order History  Outpatient  Date/Time Action Taken User Additional Information   06/05/25 1527 Sign Louise Villalobos MD Modify from Order:[id number]   06/05/25 1527 Taking Flag Checked Louise Villalobos MD [id number]     Outpatient Medication Detail     Disp Refills Start End HARMEET   indomethacin (INDOCIN) 50 MG capsule 9 capsule 0 6/5/2025 -- No   Sig - Route: Take 1 capsule (50 mg) by mouth 3 times daily (with meals). - Oral   Sent to pharmacy as: Indomethacin 50 MG Oral Capsule (INDOCIN)   Class: E-Prescribe   Order: [id number]   E-Prescribing Status: Receipt confirmed by pharmacy (6/5/2025  3:28 PM CDT)     Printout Tracking    External Result Report     Pharmacy    [id number] - [place], [place] - [address] AT [facility]     Associated Diagnoses    Acute idiopathic gout of left foot [M10.072]

## 2025-06-06 NOTE — PROVIDER NOTIFICATION
06/06/25 0958   Discharge Planning   Patient/Family Anticipates Transition to home with family   Concerns to be Addressed denies needs/concerns at this time   Living Arrangements   People in Home alone  (pt is independent with ADLs but has home health daily to assist with errands, cooking, cleaning, medications)   Type of Residence Private Residence   Is your private residence a single family home or apartment? Apartment   Number of Stairs, Within Home, Primary five  (5 stairs to get into apartment building)   Stair Railings, Within Home, Primary railings safe and in good condition   Once home, are you able to live on one level? Yes   Which level? Main Level   Bathroom Shower/Tub Tub/Shower unit   Equipment Currently Used at Home cane, straight;raised toilet seat;tub bench;grab bar, tub/shower;grab bar, toilet   Support System   Support Systems Family Members;Home Care Staff   Do you have someone available to stay with you one or two nights once you are home? Yes   Medical Clearance   Date of Physical 06/05/25   Clinic Name [facility]   It is recommended that you call and check with any specialty providers before surgery to see if you need surgical clearance.  Do you see any specialty providers outside of your primary care provider? Yes  (sees oncology/hematology. PCP to consult with them re: blood count)   Blood   Known Bleeding Disorder or Coagulopathy No   Does the patient have any Yazidi/cultural preferences related to blood products? No   Education   Has the patient scheduled or completed pre-op total joint education, either in class or online, in the last 12 months?   (family will review joint replacement book with Willard)   Falls Risk   Has the patient been identified as a high falls risk before surgery? (fallen in the last 6 months, history of falls, unsteady gait, or balance issues) No   Did an order get placed to attend outpatient pre-surgery balance evaluation? No

## 2025-06-06 NOTE — TELEPHONE ENCOUNTER
S:  Mom calls about pts lab results.     B: she states Pts  received a message from Dr Villalobos regarding pts elevated WBC. If still elevated on Monday, Dr Villalobos may delay surgery on 6/16.     Mom states pt has History of elevated WBC and sees Oncology for this.   It is not due to Gout. This elevation is normal for him.     A: Mom recommends Dr Villalobos call Oncologist and surgeon today to discuss further.   Could also discuss with Dr Love if has questions.   Mom does not want his surgery cancelled because of his WBC, since will always be elevated.   She states he can see another doctor if needed.     PT had Hip surgery a few months ago with higher WBC and Dr Love approved this surgery.     R:  She is asking if Dr Villalobos can discuss with pts specialists, or Dr Love, OR call her back to discuss.   [phone]    Lab Results   Component Value Date    WBC 11.2 06/05/2025    WBC 12.8 05/28/2025    WBC 8.4 10/26/2020    WBC 12.3 08/27/2019

## 2025-06-06 NOTE — TELEPHONE ENCOUNTER
Spoke to mom , discussed jimmie has active Gout , need to clear prior to surgery , will recommend to have Jimmie stop by in the clinic on Monday so foot can be checked and if clear can be cleared for surgery .    Mom will let me know which time works for Jimmie to come over the clinic .

## 2025-06-09 ENCOUNTER — OFFICE VISIT (OUTPATIENT)
Dept: FAMILY MEDICINE | Facility: CLINIC | Age: 45
End: 2025-06-09
Payer: COMMERCIAL

## 2025-06-09 VITALS
HEIGHT: 72 IN | OXYGEN SATURATION: 100 % | DIASTOLIC BLOOD PRESSURE: 70 MMHG | WEIGHT: 315 LBS | RESPIRATION RATE: 20 BRPM | SYSTOLIC BLOOD PRESSURE: 134 MMHG | TEMPERATURE: 98 F | BODY MASS INDEX: 42.66 KG/M2 | HEART RATE: 84 BPM

## 2025-06-09 DIAGNOSIS — M1A.0720 IDIOPATHIC CHRONIC GOUT OF LEFT ANKLE WITHOUT TOPHUS: Primary | ICD-10-CM

## 2025-06-09 PROCEDURE — 99213 OFFICE O/P EST LOW 20 MIN: CPT | Performed by: FAMILY MEDICINE

## 2025-06-09 PROCEDURE — 3078F DIAST BP <80 MM HG: CPT | Performed by: FAMILY MEDICINE

## 2025-06-09 PROCEDURE — 3075F SYST BP GE 130 - 139MM HG: CPT | Performed by: FAMILY MEDICINE

## 2025-06-09 RX ORDER — ALLOPURINOL 300 MG/1
300 TABLET ORAL DAILY
Qty: 90 TABLET | Refills: 0 | Status: SHIPPED | OUTPATIENT
Start: 2025-06-09

## 2025-06-09 NOTE — PROGRESS NOTES
Assessment & Plan     Idiopathic chronic gout of left ankle without tophus    Acute gout episode now resolved ,   Recommend to start allopurinol 2 weeks after the surgery   Recommend low protein diet .  Diet modification   Recommend to contact with any medication side effects .  - allopurinol (ZYLOPRIM) 300 MG tablet  Dispense: 90 tablet; Refill: 0            BMI  Estimated body mass index is 46.65 kg/m  as calculated from the following:    Height as of this encounter: 1.829 m (6').    Weight as of this encounter: 156 kg (344 lb).   Weight management plan: Discussed healthy diet and exercise guidelines        Carline Lamar is a 44 year old, presenting for the following health issues:    Arthritis (Gout flare up. Need to be check before surgery.)        6/9/2025     1:44 PM   Additional Questions   Roomed by Erin   Accompanied by Jose     Arthritis    History of Present Illness       Reason for visit:  Follow up         Gout flare. Needs to be seen before surgery.          Review of Systems  CV: NEGATIVE for chest pain, palpitations or peripheral edema  MUSCULOSKELETAL:   Left ankle pain now resolved .      Objective   /70 (Cuff Size: Adult Large)   Pulse 84   Temp 98  F (36.7  C)   Resp 20   Ht 1.829 m (6')   Wt (!) 156 kg (344 lb)   SpO2 100%   BMI 46.65 kg/m    Body mass index is 46.65 kg/m .  Physical Exam  Musculoskeletal:         General: No swelling.      Right lower leg: No edema.      Left lower leg: No edema.   Skin:     General: Skin is warm.   Neurological:      General: No focal deficit present.   Psychiatric:         Mood and Affect: Mood normal.            Signed Electronically by: Louise Villalobos MD

## 2025-06-14 ENCOUNTER — RESULTS FOLLOW-UP (OUTPATIENT)
Dept: URGENT CARE | Facility: URGENT CARE | Age: 45
End: 2025-06-14

## 2025-06-14 ENCOUNTER — HOSPITAL ENCOUNTER (OUTPATIENT)
Facility: CLINIC | Age: 45
Setting detail: OBSERVATION
Discharge: HOME | End: 2025-06-15
Attending: EMERGENCY MEDICINE | Admitting: INTERNAL MEDICINE
Payer: COMMERCIAL

## 2025-06-14 DIAGNOSIS — M1A.0720 IDIOPATHIC CHRONIC GOUT OF LEFT ANKLE WITHOUT TOPHUS: ICD-10-CM

## 2025-06-14 DIAGNOSIS — G89.29 CHRONIC PAIN OF BOTH KNEES: ICD-10-CM

## 2025-06-14 DIAGNOSIS — M10.9 ACUTE GOUTY ARTHRITIS: Primary | ICD-10-CM

## 2025-06-14 DIAGNOSIS — M25.561 CHRONIC PAIN OF BOTH KNEES: ICD-10-CM

## 2025-06-14 DIAGNOSIS — M10.9 GOUTY ARTHRITIS OF BOTH ANKLES: ICD-10-CM

## 2025-06-14 DIAGNOSIS — M25.562 CHRONIC PAIN OF BOTH KNEES: ICD-10-CM

## 2025-06-14 DIAGNOSIS — K21.00 GASTROESOPHAGEAL REFLUX DISEASE WITH ESOPHAGITIS, UNSPECIFIED WHETHER HEMORRHAGE: ICD-10-CM

## 2025-06-14 LAB
ANION GAP SERPL CALCULATED.3IONS-SCNC: 14 MMOL/L (ref 7–15)
BASOPHILS # BLD AUTO: 0.1 10E3/UL (ref 0–0.2)
BASOPHILS NFR BLD AUTO: 0 %
BUN SERPL-MCNC: 13.4 MG/DL (ref 6–20)
CALCIUM SERPL-MCNC: 9.3 MG/DL (ref 8.8–10.4)
CHLORIDE SERPL-SCNC: 101 MMOL/L (ref 98–107)
CREAT SERPL-MCNC: 1.14 MG/DL (ref 0.67–1.17)
CRP SERPL-MCNC: 180.86 MG/L
EGFRCR SERPLBLD CKD-EPI 2021: 82 ML/MIN/1.73M2
EOSINOPHIL # BLD AUTO: 0 10E3/UL (ref 0–0.7)
EOSINOPHIL NFR BLD AUTO: 0 %
ERYTHROCYTE [DISTWIDTH] IN BLOOD BY AUTOMATED COUNT: 12.2 % (ref 10–15)
GLUCOSE SERPL-MCNC: 115 MG/DL (ref 70–99)
HCO3 SERPL-SCNC: 24 MMOL/L (ref 22–29)
HCT VFR BLD AUTO: 35.6 % (ref 40–53)
HGB BLD-MCNC: 12.1 G/DL (ref 13.3–17.7)
IMM GRANULOCYTES # BLD: 0.1 10E3/UL
IMM GRANULOCYTES NFR BLD: 0 %
LYMPHOCYTES # BLD AUTO: 1.2 10E3/UL (ref 0.8–5.3)
LYMPHOCYTES NFR BLD AUTO: 7 %
MCH RBC QN AUTO: 29.9 PG (ref 26.5–33)
MCHC RBC AUTO-ENTMCNC: 34 G/DL (ref 31.5–36.5)
MCV RBC AUTO: 88 FL (ref 78–100)
MONOCYTES # BLD AUTO: 1.5 10E3/UL (ref 0–1.3)
MONOCYTES NFR BLD AUTO: 8 %
NEUTROPHILS # BLD AUTO: 14.6 10E3/UL (ref 1.6–8.3)
NEUTROPHILS NFR BLD AUTO: 84 %
NRBC # BLD AUTO: 0 10E3/UL
NRBC BLD AUTO-RTO: 0 /100
PLATELET # BLD AUTO: 259 10E3/UL (ref 150–450)
POTASSIUM SERPL-SCNC: 3.8 MMOL/L (ref 3.4–5.3)
RBC # BLD AUTO: 4.05 10E6/UL (ref 4.4–5.9)
SODIUM SERPL-SCNC: 139 MMOL/L (ref 135–145)
WBC # BLD AUTO: 17.4 10E3/UL (ref 4–11)

## 2025-06-14 PROCEDURE — 250N000013 HC RX MED GY IP 250 OP 250 PS 637: Performed by: INTERNAL MEDICINE

## 2025-06-14 PROCEDURE — 96374 THER/PROPH/DIAG INJ IV PUSH: CPT

## 2025-06-14 PROCEDURE — 99222 1ST HOSP IP/OBS MODERATE 55: CPT | Performed by: INTERNAL MEDICINE

## 2025-06-14 PROCEDURE — 80048 BASIC METABOLIC PNL TOTAL CA: CPT | Performed by: EMERGENCY MEDICINE

## 2025-06-14 PROCEDURE — 250N000013 HC RX MED GY IP 250 OP 250 PS 637: Performed by: EMERGENCY MEDICINE

## 2025-06-14 PROCEDURE — 85004 AUTOMATED DIFF WBC COUNT: CPT | Performed by: EMERGENCY MEDICINE

## 2025-06-14 PROCEDURE — 96375 TX/PRO/DX INJ NEW DRUG ADDON: CPT

## 2025-06-14 PROCEDURE — 258N000003 HC RX IP 258 OP 636: Performed by: INTERNAL MEDICINE

## 2025-06-14 PROCEDURE — 86140 C-REACTIVE PROTEIN: CPT | Performed by: EMERGENCY MEDICINE

## 2025-06-14 PROCEDURE — 36415 COLL VENOUS BLD VENIPUNCTURE: CPT | Performed by: EMERGENCY MEDICINE

## 2025-06-14 PROCEDURE — 250N000011 HC RX IP 250 OP 636: Mod: JZ | Performed by: EMERGENCY MEDICINE

## 2025-06-14 PROCEDURE — G0378 HOSPITAL OBSERVATION PER HR: HCPCS

## 2025-06-14 PROCEDURE — 99285 EMERGENCY DEPT VISIT HI MDM: CPT | Mod: 25

## 2025-06-14 RX ORDER — KETOROLAC TROMETHAMINE 15 MG/ML
15 INJECTION, SOLUTION INTRAMUSCULAR; INTRAVENOUS ONCE
Status: COMPLETED | OUTPATIENT
Start: 2025-06-14 | End: 2025-06-14

## 2025-06-14 RX ORDER — METHYLPREDNISOLONE SODIUM SUCCINATE 125 MG/2ML
125 INJECTION INTRAMUSCULAR; INTRAVENOUS ONCE
Status: COMPLETED | OUTPATIENT
Start: 2025-06-14 | End: 2025-06-14

## 2025-06-14 RX ORDER — OXYBUTYNIN CHLORIDE 5 MG/1
30 TABLET, EXTENDED RELEASE ORAL DAILY
Status: DISCONTINUED | OUTPATIENT
Start: 2025-06-15 | End: 2025-06-15 | Stop reason: HOSPADM

## 2025-06-14 RX ORDER — PROCHLORPERAZINE MALEATE 5 MG/1
10 TABLET ORAL EVERY 6 HOURS PRN
Status: DISCONTINUED | OUTPATIENT
Start: 2025-06-14 | End: 2025-06-15 | Stop reason: HOSPADM

## 2025-06-14 RX ORDER — PRAZOSIN HYDROCHLORIDE 1 MG/1
3 CAPSULE ORAL AT BEDTIME
Status: DISCONTINUED | OUTPATIENT
Start: 2025-06-14 | End: 2025-06-15 | Stop reason: HOSPADM

## 2025-06-14 RX ORDER — AMOXICILLIN 250 MG
1 CAPSULE ORAL 2 TIMES DAILY PRN
Status: DISCONTINUED | OUTPATIENT
Start: 2025-06-14 | End: 2025-06-15 | Stop reason: HOSPADM

## 2025-06-14 RX ORDER — SODIUM CHLORIDE 9 MG/ML
INJECTION, SOLUTION INTRAVENOUS CONTINUOUS
Status: ACTIVE | OUTPATIENT
Start: 2025-06-14 | End: 2025-06-15

## 2025-06-14 RX ORDER — PREDNISONE 20 MG/1
40 TABLET ORAL DAILY
Status: DISCONTINUED | OUTPATIENT
Start: 2025-06-15 | End: 2025-06-15 | Stop reason: HOSPADM

## 2025-06-14 RX ORDER — AMOXICILLIN 250 MG
2 CAPSULE ORAL 2 TIMES DAILY PRN
Status: DISCONTINUED | OUTPATIENT
Start: 2025-06-14 | End: 2025-06-15 | Stop reason: HOSPADM

## 2025-06-14 RX ORDER — ONDANSETRON 4 MG/1
4 TABLET, ORALLY DISINTEGRATING ORAL EVERY 6 HOURS PRN
Status: DISCONTINUED | OUTPATIENT
Start: 2025-06-14 | End: 2025-06-15 | Stop reason: HOSPADM

## 2025-06-14 RX ORDER — PANTOPRAZOLE SODIUM 40 MG/1
40 TABLET, DELAYED RELEASE ORAL
Status: DISCONTINUED | OUTPATIENT
Start: 2025-06-15 | End: 2025-06-15 | Stop reason: HOSPADM

## 2025-06-14 RX ORDER — OXYCODONE HYDROCHLORIDE 5 MG/1
5 TABLET ORAL ONCE
Refills: 0 | Status: COMPLETED | OUTPATIENT
Start: 2025-06-14 | End: 2025-06-14

## 2025-06-14 RX ORDER — OXYCODONE HYDROCHLORIDE 5 MG/1
5 TABLET ORAL EVERY 4 HOURS PRN
Status: DISCONTINUED | OUTPATIENT
Start: 2025-06-14 | End: 2025-06-15 | Stop reason: HOSPADM

## 2025-06-14 RX ORDER — MIRABEGRON 50 MG/1
50 TABLET, FILM COATED, EXTENDED RELEASE ORAL DAILY
Status: DISCONTINUED | OUTPATIENT
Start: 2025-06-15 | End: 2025-06-15 | Stop reason: HOSPADM

## 2025-06-14 RX ORDER — DULOXETIN HYDROCHLORIDE 30 MG/1
120 CAPSULE, DELAYED RELEASE ORAL DAILY
Status: DISCONTINUED | OUTPATIENT
Start: 2025-06-15 | End: 2025-06-15 | Stop reason: HOSPADM

## 2025-06-14 RX ORDER — ONDANSETRON 2 MG/ML
4 INJECTION INTRAMUSCULAR; INTRAVENOUS EVERY 6 HOURS PRN
Status: DISCONTINUED | OUTPATIENT
Start: 2025-06-14 | End: 2025-06-15 | Stop reason: HOSPADM

## 2025-06-14 RX ORDER — ACETAMINOPHEN 325 MG/1
975 TABLET ORAL 3 TIMES DAILY
Status: DISCONTINUED | OUTPATIENT
Start: 2025-06-14 | End: 2025-06-15 | Stop reason: HOSPADM

## 2025-06-14 RX ORDER — COLCHICINE 0.6 MG/1
0.6 TABLET ORAL 2 TIMES DAILY
Status: DISCONTINUED | OUTPATIENT
Start: 2025-06-14 | End: 2025-06-15 | Stop reason: HOSPADM

## 2025-06-14 RX ADMIN — OLANZAPINE 15 MG: 10 TABLET, FILM COATED ORAL at 21:50

## 2025-06-14 RX ADMIN — COLCHICINE 0.6 MG: 0.6 TABLET ORAL at 23:54

## 2025-06-14 RX ADMIN — OXYCODONE HYDROCHLORIDE 5 MG: 5 TABLET ORAL at 19:27

## 2025-06-14 RX ADMIN — ACETAMINOPHEN 975 MG: 325 TABLET, FILM COATED ORAL at 21:50

## 2025-06-14 RX ADMIN — SODIUM CHLORIDE: 0.9 INJECTION, SOLUTION INTRAVENOUS at 21:51

## 2025-06-14 RX ADMIN — KETOROLAC TROMETHAMINE 15 MG: 15 INJECTION, SOLUTION INTRAMUSCULAR; INTRAVENOUS at 17:29

## 2025-06-14 RX ADMIN — METHYLPREDNISOLONE SODIUM SUCCINATE 125 MG: 125 INJECTION, POWDER, FOR SOLUTION INTRAMUSCULAR; INTRAVENOUS at 17:28

## 2025-06-14 RX ADMIN — OXYCODONE HYDROCHLORIDE 5 MG: 5 TABLET ORAL at 17:03

## 2025-06-14 ASSESSMENT — ACTIVITIES OF DAILY LIVING (ADL)
ADLS_ACUITY_SCORE: 60
ADLS_ACUITY_SCORE: 59
ADLS_ACUITY_SCORE: 60
ADLS_ACUITY_SCORE: 59
ADLS_ACUITY_SCORE: 60
ADLS_ACUITY_SCORE: 60
ADLS_ACUITY_SCORE: 59

## 2025-06-14 NOTE — ED TRIAGE NOTES
Pt presents to ED with bilateral foot pain and redness. The right foot worse than the left. Pt states the pain started on Wednesday, but the swelling and redness showed up yesterday. He was seen at  and they told him it didn't appear like it was gout and was provided with tylenol #3. Pt reports that this feels like when he had gout in the past. The pain meds provided have not been helpful.   Pt here with his care coordinator as his guardians were unable to be here today.   Per coordinator, pt is supposed to have knee surgery with Dr. Mayer on Monday and they would like him contacted about the plan as he is on call this weekend to make sure that the surgery can still be done.

## 2025-06-14 NOTE — ED PROVIDER NOTES
Emergency Department Note      History of Present Illness     Chief Complaint   Foot Pain    HPI   Kimo Greenwood is a 44 year old male with history of gout, obesity, HTN, and more who presents to the ED via EMS with his care coordinator for evaluation of foot pain. Willard reports that his feet are red and swollen, the ankle and sides of his feet are the most bothersome. Today Willard could not get out of bed due to the pain, Tylenol is not relieving his pain. No fever. His care coordinator reports that he had unconfirmed gout a couple months back, beginning on on 06/09 he had a flare up. He received some medications that helped some, on 06/11 he had worsening pain. He was seen in  yesterday, at that time his feet and ankles were not red. Willard is supposed to have a knee replacement on 06/16, they are concerned that the flare will interfere with the surgery.    Independent Historian   Care coordinator as detailed above.    Review of External Notes   Reviewed urgent care note from yesterday.  Reviewed PCP note from 6/9 and 6/5.  Reviewed urgent care note from 5/28.    Past Medical History     Medical History and Problem List   Arthritis  Asthma  Anemia   Alcohol use disorder   Patel's esophagus  Chronic pain - left hip/leg pain  Cannabis use disorder   Colonic polyps   Duodenitis   GERD   History of anesthesia complications  Hypertension  Hyponatremia   Hypokalemia   Leukocytosis  Laryngopharyngeal reflux disease  Marijuana abuse  Mass Retroperitoneal   MDD   Mild mental retardation   Nocturnal enuresis  Obesity  Overactive bladder  Obstructive sleep apnea  Pneumonia   Seborrheic dermatitis  Sepsis   Sleep apnea  Sacral pain  Speech/language delay  Suicidal ideation  Severe recurrent major depressive disorder   Somatic dysfunction of lumbar region  Somatic dysfunction of sacral region  Tobacco use disorder   Vitamin D deficiency  Venous stasis dermatitis of both lower extremities     Medications   Cymbalta    Ditropan   Indocin   Mycolog   Myrbetriq   Minipress   Protonix   Zyprexa     Surgical History   Past Surgical History:   Procedure Laterality Date    ARTHROPLASTY HIP Left 01/25/2017    Procedure: ARTHROPLASTY HIP;  Surgeon: Bala Randall MD;  Location: [facility] OR    ARTHROPLASTY HIP Left     ARTHROPLASTY HIP Right 04/14/2025    Procedure: Right total hip arthroplasty;  Surgeon: Bala Randall MD;  Location: [facility] OR    ARTHROPLASTY REVISION HIP Left 06/24/2019    Procedure: Revision left total hip arthroplasty with a head and liner exchange to a Biomet dual mobility head and liner.;  Surgeon: Bala Randall MD;  Location: [facility] OR    BACK SURGERY      BLADDER SURGERY      Ibarra, [facility],Interstem stimulator implant    CLOSED REDUCTION HIP Left 06/10/2019    Procedure: Closed reduction under general anesthesia left recurrent prosthetic hip dislocation;  Surgeon: Rafat Cazares MD;  Location: [facility] OR    COLONOSCOPY N/A 10/30/2015    Procedure: COMBINED COLONOSCOPY, SINGLE OR MULTIPLE BIOPSY/POLYPECTOMY BY BIOPSY;  Surgeon: Alexis Jackson MD;  Location:  GI    COLONOSCOPY N/A 11/17/2016    Procedure: COMBINED COLONOSCOPY, SINGLE OR MULTIPLE BIOPSY/POLYPECTOMY BY BIOPSY;  Surgeon: Cat Huber MD;  Location: [facility]    COLONOSCOPY N/A 10/28/2020    Procedure: COLONOSCOPY;  Surgeon: You Kraus MD;  Location:  OR    COLONOSCOPY      ESOPHAGOSCOPY, GASTROSCOPY, DUODENOSCOPY (EGD), COMBINED N/A 11/17/2016    Procedure: COMBINED ESOPHAGOSCOPY, GASTROSCOPY, DUODENOSCOPY (EGD), BIOPSY SINGLE OR MULTIPLE;  Surgeon: Cat Huber MD;  Location: [facility]    ESOPHAGOSCOPY, GASTROSCOPY, DUODENOSCOPY (EGD), COMBINED N/A 03/12/2020    Procedure: ESOPHAGOGASTRODUODENOSCOPY WITH BIOPSIES;  Surgeon: You Kraus MD;  Location:  OR    ESOPHAGOSCOPY, GASTROSCOPY, DUODENOSCOPY (EGD), COMBINED N/A 10/28/2020    Procedure: ESOPHAGOGASTRODUODENOSCOPY, WITH  BIOPSY;  Surgeon: You Kraus MD;  Location: [facility] OR    ESOPHAGOSCOPY, GASTROSCOPY, DUODENOSCOPY (EGD), COMBINED      ESOPHAGOSCOPY, GASTROSCOPY, DUODENOSCOPY (EGD), COMBINED N/A 11/29/2023    Procedure: Esophagoscopy, gastroscopy, duodenoscopy (EGD), combined with biopsies using forceps;  Surgeon: You Kraus MD;  Location: [facility] GI    EXAM UNDER ANESTHESIA, FULGURATE CONDYLOMA ANUS, COMBINED N/A 12/22/2016    Procedure: COMBINED EXAM UNDER ANESTHESIA, FULGURATE CONDYLOMA ANUS;  Surgeon: Nya Cabello MD;  Location: [facility] OR    GENITOURINARY SURGERY      JOINT REPLACEMENT Left 01/01/2017    MARGO, Revision Left MARGO    OTHER SURGICAL HISTORY      interstim stimulator implant    SC CYSTOURETHROSCOPY INJ CHEMODENERVATION BLADDER N/A 01/16/2019    Procedure: CYSTOSCOPY BOTOX BLADDER INJECTIONS (100 UNITS IN 10CC);  Surgeon: Didier Ibarra MD;  Location: [facility];  Service: Urology    SC IMPLANT PERIPH/GASTRIC NEUROSTIM/ N/A 01/08/2020    Procedure: NEW INTERSTIM LEAD, REPLACE OLD; NEW INTERSTIM BATTERY, REPLACE OLD;  Surgeon: Didier Ibarra MD;  Location: [facility];  Service: Urology     Physical Exam     Patient Vitals for the past 24 hrs:   BP Temp Pulse Resp SpO2   06/14/25 2001 129/78 -- 95 18 97 %   06/14/25 1549 (!) 142/109 98.1  F (36.7  C) 116 24 100 %     Physical Exam    Head:  The scalp, face, and head appear normal  Eyes:  Conjunctivae are normal  ENT:    The nose is normal    Pinnae are normal  Neck:  Trachea midline  CV:  Normal rate, regular rhythm. DP pulses normal bilaterally.   Resp:  No respiratory distress   Musc:  Normal muscular tone    Bilateral right greater than left ankle pain/swelling/warmth.  Pain with range of motion of both ankles.  No pain with range of motion of toes.  Some pain to palpation of lateral foot.  No lymphangitis.   Skin:  Venous stasis dermatitis noted to bilateral lower extremities.  Neuro: Speech is normal and fluent. Face is  symmetric. Moving all extremities well.             Diagnostics     Lab Results   Labs Ordered and Resulted from Time of ED Arrival to Time of ED Departure   BASIC METABOLIC PANEL - Abnormal       Result Value    Sodium 139      Potassium 3.8      Chloride 101      Carbon Dioxide (CO2) 24      Anion Gap 14      Urea Nitrogen 13.4      Creatinine 1.14      GFR Estimate 82      Calcium 9.3      Glucose 115 (*)    CRP INFLAMMATION - Abnormal    CRP Inflammation 180.86 (*)    CBC WITH PLATELETS AND DIFFERENTIAL - Abnormal    WBC Count 17.4 (*)     RBC Count 4.05 (*)     Hemoglobin 12.1 (*)     Hematocrit 35.6 (*)     MCV 88      MCH 29.9      MCHC 34.0      RDW 12.2      Platelet Count 259      % Neutrophils 84      % Lymphocytes 7      % Monocytes 8      % Eosinophils 0      % Basophils 0      % Immature Granulocytes 0      NRBCs per 100 WBC 0      Absolute Neutrophils 14.6 (*)     Absolute Lymphocytes 1.2      Absolute Monocytes 1.5 (*)     Absolute Eosinophils 0.0      Absolute Basophils 0.1      Absolute Immature Granulocytes 0.1      Absolute NRBCs 0.0         Imaging   No orders to display     Independent Interpretation   None    ED Course      Medications Administered   Medications   oxyCODONE (ROXICODONE) tablet 5 mg (5 mg Oral $Given 6/14/25 1703)   ketorolac (TORADOL) injection 15 mg (15 mg Intravenous $Given 6/14/25 1729)   methylPREDNISolone Na Suc (solu-MEDROL) injection 125 mg (125 mg Intravenous $Given 6/14/25 1728)   oxyCODONE (ROXICODONE) tablet 5 mg (5 mg Oral $Given 6/14/25 1927)     Procedures   Procedures     Discussion of Management   Admitting Hospitalist, Dr. Holland  Orthopedics, Dr. Rossi    ED Course   ED Course as of 06/14/25 2028   Sat Jun 14, 2025   1645 I obtained history and examined the patient as noted above.    1926 I spoke with Dr. Holland from the hospitalist service regarding the patient's presentation, findings here in the ED, and plan of care.       Additional  Documentation  None    Medical Decision Making / Diagnosis     CMS Diagnoses: None    MIPS   None       [facility]   Kimo Greenwood is a 44 year old male who presents with bilateral foot and ankle pain.  Patient reports this feels like prior episodes of gout that he has had.  While he has never had confirmatory testing with joint fluid analysis, he did improve in the past with treatment with steroids.  Exam, he appears to have bilateral ankle effusions.  Given bilateral symptoms, highly doubt infection.  Especially as patient denies any fevers.  He does have leukocytosis which may be due to stress to margination.  Patient has documented high uric acid levels.  His presentation is certainly consistent with gouty arthritis.  I discussed with on-call orthopedic surgery and spoke with Dr. Rossi and shared history and exam.  He agrees that this is likely represents gouty arthritis and recommended treating empirically.  He did not believe any arthrocentesis was indicated at this time.  Symptoms treated here with Toradol, oxycodone, and Solu-Medrol also given.  Patient reports that pain is so severe that he is unable to walk.  He is not his own guardian and does not have 24/7 care.  I discussed with his guardian, Traci, who states that she is concerned with patient being at home by himself tonight given his inability to get to the bathroom and back in addition to food and hydration.  We will plan on observation admission for further treatment.  Guardian prefers this plan.  All questions answered.  I discussed with hospitalist, Dr. Holland, who accepts patient for admission.    Disposition   The patient was admitted to the hospital.     Diagnosis     ICD-10-CM    1. Gouty arthritis of both ankles  M10.9              Scribe Disclosure:  I, Deana Ogden, am serving as a scribe at 4:55 PM on 6/14/2025 to document services personally performed by Jean Marie Aj MD based on my observations and the provider's statements to  me.          Jean Marie Aj MD  06/14/25 2031

## 2025-06-15 VITALS
TEMPERATURE: 97.7 F | DIASTOLIC BLOOD PRESSURE: 81 MMHG | BODY MASS INDEX: 42.66 KG/M2 | WEIGHT: 315 LBS | HEART RATE: 84 BPM | OXYGEN SATURATION: 98 % | HEIGHT: 72 IN | RESPIRATION RATE: 18 BRPM | SYSTOLIC BLOOD PRESSURE: 139 MMHG

## 2025-06-15 LAB
ANION GAP SERPL CALCULATED.3IONS-SCNC: 13 MMOL/L (ref 7–15)
BUN SERPL-MCNC: 21.2 MG/DL (ref 6–20)
CALCIUM SERPL-MCNC: 9.1 MG/DL (ref 8.8–10.4)
CHLORIDE SERPL-SCNC: 98 MMOL/L (ref 98–107)
CREAT SERPL-MCNC: 1.16 MG/DL (ref 0.67–1.17)
EGFRCR SERPLBLD CKD-EPI 2021: 80 ML/MIN/1.73M2
GLUCOSE SERPL-MCNC: 194 MG/DL (ref 70–99)
HCO3 SERPL-SCNC: 24 MMOL/L (ref 22–29)
POTASSIUM SERPL-SCNC: 4.1 MMOL/L (ref 3.4–5.3)
SODIUM SERPL-SCNC: 135 MMOL/L (ref 135–145)

## 2025-06-15 PROCEDURE — 250N000012 HC RX MED GY IP 250 OP 636 PS 637: Performed by: INTERNAL MEDICINE

## 2025-06-15 PROCEDURE — G0378 HOSPITAL OBSERVATION PER HR: HCPCS

## 2025-06-15 PROCEDURE — 36415 COLL VENOUS BLD VENIPUNCTURE: CPT | Performed by: INTERNAL MEDICINE

## 2025-06-15 PROCEDURE — 250N000013 HC RX MED GY IP 250 OP 250 PS 637: Performed by: INTERNAL MEDICINE

## 2025-06-15 PROCEDURE — 99239 HOSP IP/OBS DSCHRG MGMT >30: CPT | Performed by: NURSE PRACTITIONER

## 2025-06-15 PROCEDURE — 80048 BASIC METABOLIC PNL TOTAL CA: CPT | Performed by: INTERNAL MEDICINE

## 2025-06-15 RX ORDER — PANTOPRAZOLE SODIUM 40 MG/1
40 TABLET, DELAYED RELEASE ORAL
Qty: 10 TABLET | Refills: 0 | Status: SHIPPED | OUTPATIENT
Start: 2025-06-16 | End: 2025-06-26

## 2025-06-15 RX ORDER — ALLOPURINOL 100 MG/1
100 TABLET ORAL DAILY
Status: DISCONTINUED | OUTPATIENT
Start: 2025-06-15 | End: 2025-06-15 | Stop reason: HOSPADM

## 2025-06-15 RX ORDER — ALLOPURINOL 100 MG/1
100 TABLET ORAL DAILY
Status: DISCONTINUED | OUTPATIENT
Start: 2025-06-16 | End: 2025-06-15

## 2025-06-15 RX ORDER — COLCHICINE 0.6 MG/1
0.6 TABLET ORAL 2 TIMES DAILY
Qty: 5 TABLET | Refills: 0 | Status: SHIPPED | OUTPATIENT
Start: 2025-06-15 | End: 2025-06-18

## 2025-06-15 RX ORDER — ALLOPURINOL 100 MG/1
300 TABLET ORAL DAILY
Qty: 90 TABLET | Refills: 0 | Status: SHIPPED | OUTPATIENT
Start: 2025-06-15 | End: 2025-07-15

## 2025-06-15 RX ORDER — ALLOPURINOL 100 MG/1
100 TABLET ORAL DAILY
Status: DISCONTINUED | OUTPATIENT
Start: 2025-06-17 | End: 2025-06-15

## 2025-06-15 RX ORDER — PREDNISONE 20 MG/1
40 TABLET ORAL DAILY
Qty: 6 TABLET | Refills: 0 | Status: SHIPPED | OUTPATIENT
Start: 2025-06-16 | End: 2025-06-19

## 2025-06-15 RX ADMIN — MIRABEGRON 50 MG: 50 TABLET, EXTENDED RELEASE ORAL at 13:33

## 2025-06-15 RX ADMIN — ACETAMINOPHEN 975 MG: 325 TABLET, FILM COATED ORAL at 09:03

## 2025-06-15 RX ADMIN — PANTOPRAZOLE SODIUM 40 MG: 40 TABLET, DELAYED RELEASE ORAL at 13:33

## 2025-06-15 RX ADMIN — OXYBUTYNIN CHLORIDE 30 MG: 5 TABLET, EXTENDED RELEASE ORAL at 13:30

## 2025-06-15 RX ADMIN — Medication 5 MG: at 01:16

## 2025-06-15 RX ADMIN — CALCIUM POLYCARBOPHIL 625 MG: 625 TABLET, FILM COATED ORAL at 13:33

## 2025-06-15 RX ADMIN — ACETAMINOPHEN 975 MG: 325 TABLET, FILM COATED ORAL at 13:34

## 2025-06-15 RX ADMIN — DULOXETINE HYDROCHLORIDE 120 MG: 30 CAPSULE, DELAYED RELEASE PELLETS ORAL at 13:31

## 2025-06-15 RX ADMIN — OXYCODONE HYDROCHLORIDE 5 MG: 5 TABLET ORAL at 01:16

## 2025-06-15 RX ADMIN — PREDNISONE 40 MG: 20 TABLET ORAL at 09:04

## 2025-06-15 RX ADMIN — COLCHICINE 0.6 MG: 0.6 TABLET ORAL at 09:05

## 2025-06-15 ASSESSMENT — ACTIVITIES OF DAILY LIVING (ADL)
ADLS_ACUITY_SCORE: 54

## 2025-06-15 NOTE — H&P
Hospitalist Admission   History and Physical    CC:  Bilateral foot pain    HPI:  44 year old male with history of venous stasis dermatitis and CKD who presents to the ED via EMS with his care coordinator for evaluation of foot pain.     Patient reports that his feet are red and swollen, the ankle and sides of his feet are the most bothersome. He could not ambulate today due to the pain. Tylenol is not relieving his pain. No fever. Denies any injury or trauma    His care coordinator reported that he had suspected diagnosis of gout a couple months back,  and beginning on on 6/9/25 he had another flare up. He was seen in  yesterday, at that time his feet and ankles were not red.     Patient is supposed to have a knee replacement on 06/16, they are concerned that the flare will interfere with the surgery.         PMH:  Past Medical History:   Diagnosis Date    Arthritis     DDD hips and knees    Asthma     Asthma     Patel's esophagus     Chronic pain     Chronic pain - left hip/leg pain     degenerative disc disease, back, hips, knees    Gastroesophageal reflux disease     History of anesthesia complications     agitation x1 after interstim 2013    Hypertension     Hypertension     Leukocytosis     LPRD (laryngopharyngeal reflux disease)     Marijuana abuse     Marijuana abuse     MDD (major depressive disorder)     MDD (major depressive disorder)     Mental retardation, mild (I.Q. 50-70)     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    With associated speech/language delay    Obesity 11/11/2010    LOREN (obstructive sleep apnea)     Uses a CPAP    LOREN (obstructive sleep apnea)     Seborrheic dermatitis     Seborrheic dermatitis     Sleep apnea     CPAP        Medications:  Current Facility-Administered Medications   Medication Dose Route Frequency Provider Last Rate Last Admin    oxyCODONE (ROXICODONE) tablet 5 mg  5 mg Oral Once Jean Marie Aj MD         Current Outpatient Medications   Medication Sig  Dispense Refill    acetaminophen-codeine (TYLENOL #3) 300-30 MG per tablet Take 1 tablet by mouth every 6 hours as needed for severe pain. 10 tablet 0    allopurinol (ZYLOPRIM) 300 MG tablet Take 1 tablet (300 mg) by mouth daily. (Patient not taking: Reported on 6/13/2025) 90 tablet 0    calcium polycarbophil (FIBER-LAX) 625 MG tablet Take 1 tablet (625 mg) by mouth daily. 90 tablet 3    DULoxetine (CYMBALTA) 60 MG capsule Take 120 mg by mouth daily       econazole nitrate 1 % external cream Apply topically as needed.      ferrous sulfate (FEROSUL) 325 (65 Fe) MG tablet TAKE 1 TABLET BY MOUTH DAILY WITH BREAKFAST 28 tablet 11    hydrocortisone 2.5 % cream Apply topically 2 times daily as needed.      indomethacin (INDOCIN) 50 MG capsule Take 1 capsule (50 mg) by mouth 3 times daily (with meals). 9 capsule 0    ketoconazole (NIZORAL) 2 % external cream Apply topically 2 times daily as needed.      mirabegron (MYRBETRIQ) 50 MG 24 hr tablet Take 1 tablet by mouth daily      nystatin-triamcinolone (MYCOLOG II) 954486-3.1 UNIT/GM-% external cream Apply topically 2 times daily 60 g 0    OLANZapine (ZYPREXA) 15 MG tablet Take 15 mg by mouth at bedtime.      oxyBUTYnin ER (DITROPAN XL) 15 MG 24 hr tablet Take 2 tablets (30 mg) by mouth daily 60 tablet 1    pantoprazole (PROTONIX) 40 MG EC tablet TAKE 1 TABLET BY MOUTH ONCE DAILY 30-60 MINUTE(S) BEFORE FIRST MEAL OF THE DAY 28 tablet 10    prazosin (MINIPRESS) 1 MG capsule Take 3 mg by mouth At Bedtime      tirzepatide-Weight Management (ZEPBOUND) 12.5 MG/0.5ML prefilled pen Inject 0.5 mLs (12.5 mg) subcutaneously every 7 days. (Patient not taking: Reported on 6/13/2025) 6 mL 1        Allergies:     Allergies   Allergen Reactions    Other [No Clinical Screening - See Comments] Other (See Comments)     Awoke from anesthesia with anger and ripping off dressing, after interstim placement, outside hospital 2013        Family History:  noncontributory    Social History:       Review of Systems: Comprehensive greater than 10 point review systems otherwise negative besides that detailed above    Physical exam:  BP (!) 142/109   Pulse 116   Temp 98.1  F (36.7  C)   Resp 24   SpO2 100%    PSYCH: pleasant, oriented, No acute distress.  HEART:  Normal S1, S2 with no edema.  LUNGS:  Clear to auscultation, normal Respiratory effort.  ABDOMEN:  Soft, no hepatosplenomegaly, normal bowel sounds.  SKIN:  Dry to touch, No rash.   MUSC:  Pain with passive ROM of B ankles.  Swelling of ankle joints.  Redness of feet outlined with pen    Pertinent laboratory and imaging data reviewed above in HPI         Impression:  1.  Bilateral ankle/foot pain, inflammation, and swelling due to suspected acute gout flare  - Treat with colchicine  - prednisone 40 mg PO daily (did receive dose of IV solumedrol in ER)  - Would avoid NSAIDs given history of chronic kidney disease (did receive dose of Toradol in ER)    2.  Hyperuricemia  -Uric acid level measured at 8.6 on 6/13/2025  - suspected gout by hx; has had previous flare of foot similar to this but never formally diagnosed via crystal analysis  - supposed to start Allopurinol after his upcoming knee surgery    3.  Morbid obesity    4.  Degenerative joint disease of the L knee  -scheduled for elective L total knee arthroplasty on 6/16/2025 with Dr. Randall  - will consult ortho to assist with timing of procedure; may need to be delayed due to sx    5.  LOREN  - Uses CPAP    6.  Chronic kidney disease  - Baseline creatinine are 1.2 historically  - repeat BMP in AM    7.  Leukocytosis  - Suspect due to acute gout flare    8.  History of cognitive dysfunction; has a guardian    Obs admit      CODE STATUS: Full code

## 2025-06-15 NOTE — PLAN OF CARE
PRIMARY DIAGNOSIS: APRIL feet pain - Gout flare-up  OUTPATIENT/OBSERVATION GOALS TO BE MET BEFORE DISCHARGE:  1. Pain Status: Improved-controlled with oral pain medications.    2. Return to near baseline physical activity: No    3. Cleared for discharge by consultants (if involved): No    Discharge Planner Nurse   Safe discharge environment identified: No  Barriers to discharge: Yes       Entered by: Jasper Cortes RN 06/15/2025 4:43 AM     Please review provider order for any additional goals.   Nurse to notify provider when observation goals have been met and patient is ready for discharge.            Pt is Aox4 and flat affect. IV is infusing NS at 100 mL/hr. Pt pivoted to bed SBA from the transport bed. Pt c/o pain 9/10 but first said that he had no pain - pt was given PRN oxycodone and melatonin. APRIL feet are edematous. Pt has incision on R hip from hip replacement 2 months ago, redness in groin covered with barrier cream. Orthosurg to consult. Continue to follow plan of care.     /76 (BP Location: Right arm)   Pulse 58   Temp 97.3  F (36.3  C) (Oral)   Resp 18   Ht 1.829 m (6')   Wt (!) 155 kg (341 lb 11.4 oz)   SpO2 98%   BMI 46.34 kg/m

## 2025-06-15 NOTE — ED NOTES
MusculoskeletalMusculoskeletal WDL:  (PT COMES IN WITH bilat feet swelling, redness and pain. pt has a hx of gout and states that it feels the same. pt having significant pain with standing.)General Mobility: significantly impairedLeft Joint Tenderness: tenderness; ankle; footRight Joint Tenderness: ankle; foot; tendernessLeft Joint Swelling: swelling; footRight Joint Swelling: foot; swelling       SkinSkin WDL: .WDL except; color; integrity (right hip surgery incision wound)Skin Integrity: rub/scrape (groin

## 2025-06-15 NOTE — PROGRESS NOTES
ORTHO STAFF    Suspect bilateral ankle gout. OK for diet today. Full consult to follow    Enid HOUSER

## 2025-06-15 NOTE — DISCHARGE SUMMARY
[facility]  Hospitalist Discharge Summary      Date of Admission:  6/14/2025  Date of Discharge:  6/15/2025  Discharging Provider: LAMBERT Arora [facility]  Discharge Service: Hospitalist Service    Discharge Diagnoses   Suspected gout with hyperuricemia (Uric acid level: 8.6 on 6/13/2025)  Morbid obesity  Degenerative joint disease of the left knee, scheduled for elective total knee arthroplasty on 6/16/2025  Obstructive sleep apnea, uses CPAP  Chronic kidney disease (Baseline creatinine: 1.2)  Leukocytosis, suspected due to acute gout flare  History of cognitive dysfunction; has a guardian    Clinically Significant Risk Factors     # Morbid Obesity: Estimated body mass index is 46.34 kg/m  as calculated from the following:    Height as of this encounter: 1.829 m (6').    Weight as of this encounter: 155 kg (341 lb 11.4 oz).       Follow-ups Needed After Discharge   Follow-up Appointments       Follow Up ([facility])      Follow up with primary care provider, Ciro Love, within 3-5 days to evaluate medication change and for hospital follow- up.      Appointments on [place] and/or [facility] (with [facility] or [facility] provider or service). Call [phone] if you haven't heard regarding these appointments within 7 days of discharge.              Unresulted Labs Ordered in the Past 30 Days of this Admission       No orders found for last 31 day(s).          Discharge Disposition   Discharged to home  Condition at discharge: Stable    Hospital Course   Kimo Greenwood is a 44-year-old male with history of hypertension, CKD stage III, obstructive sleep apnea on CPAP, asthma, marijuana use disorder, tobacco use disorder, obesity, Patel's esophagus, venous stasis dermatitis, chronic pain, osteoarthritis, degenerative disc disease, and depression who was admitted due to bilateral ankle and foot pain, inflammation, and swelling, suspected to be an acute gout flare.    The patient  reported red and swollen feet, particularly around the ankles and sides, with significant pain preventing ambulation. Tylenol was ineffective in managing the pain. There was no fever, and the patient denied any injury or trauma. A suspected diagnosis of gout was noted from a few months prior, with a recent flare beginning on 6/9/25. The patient was seen in urgent care yesterday, where the feet and ankles were not red at that time.    Hyperuricemia  Gouty arthritis of both ankles  Bilateral Ankle/Foot Pain and Inflammation with Uric Acid level of 8.6: The patient received 125 mg of IV Solu-Medrol in the ED, and treatment with Colchicine 0.6 mg twice daily was initiated on 6/14/25. Although the patient was given a dose of Toradol in the ED, NSAIDs will be avoided moving forward due to chronic kidney disease. Plan is to treat the patient with oral Prednisone and Colchicine.   -- Continue monitoring uric acid levels  -- Continue with Colchicine 0.6 mg twice daily x3 days  -- Prednisone 40mg daily for total of 4 days  -- Continue PTA Allopurinol 300mg daily   -- Follow up PCP within 5-7 days    Degenerative Joint Disease of the Left Knee: The patient was originally scheduled for elective left total knee arthroplasty on 6/16/2025 with Dr. Randall. After consulting with orthopedics, Dr. Rossi recommended postponing the procedure to July 2025 due to the patient's current symptoms.    Chronic Kidney Disease: Baseline creatinine is 1.14 to 1.81, now 1.16.   -- PCP will monitor renal function    Chronic leukocytosis: WBC was 17.4 on admission.   -- The patient follows outpatient oncology    Obstructive Sleep Apnea:  -- Continue use of CPAP.    Discharge Instructions: The patient is being discharged to home.   -- Follow up with primary care and orthopedics as scheduled.  -- Monitor symptoms and seek medical attention if conditions worsen.  -- Adhere to medication regimen as prescribed.    Consultations This Hospital Stay    ORTHOPEDIC SURGERY IP CONSULT  SOCIAL WORK IP CONSULT    Code Status   Full Code    Time Spent on this Encounter   I, LAMBERT Arora CNP, personally saw the patient today and spent greater than 30 minutes discharging this patient.     LAMBERT Arora CNP  [facility] OBSERVATION DEPT  [address] [place] [address]  Phone: [phone]  ______________________________________________________________________    Physical Exam   Vital Signs: Temp: 97.7  F (36.5  C) Temp src: Oral BP: 139/81 Pulse: 84   Resp: 18 SpO2: 98 % O2 Device: None (Room air)    Weight: 341 lbs 11.41 oz    GENERAL: Alert and oriented to person, place, and time. Cooperative, walking in the room.   CV: Normal S1 and S2, no edema.  PULM: Clear to auscultation, normal respiratory effort.  ABDOMEN: Soft, no hepatosplenomegaly, normal bowel sounds.  SKIN: Dry to touch, no rash.  MSK: Pain with passive range of motion of both ankles, swelling of ankle joints, redness of feet outlined with pen.  PSYCH: Pleasant, oriented, normal affect.    Primary Care Physician   Ciro Love    Discharge Orders      Orthopedic  Referral      Reason for your hospital stay    Gout flare     Activity    Your activity upon discharge: activity as tolerated     Follow Up ([facility])    Follow up with primary care provider, Ciro Love, within 3-5 days to evaluate medication change and for hospital follow- up.      Appointments on [place] and/or [facility] (with [facility] or [facility] provider or service). Call [phone] if you haven't heard regarding these appointments within 7 days of discharge.     Diet    Follow this diet upon discharge: Current Diet:Orders Placed This Encounter      Regular Diet Adult       Significant Results and Procedures   Results for orders placed or performed in visit on 05/28/25   XR Foot Left G/E 3 Views    Narrative    EXAM: XR FOOT LEFT G/E 3 VIEWS  LOCATION: [facility]  CLINIC [place]  DATE: 5/28/2025    INDICATION: left great toe swelling, stubbed pinky toe yesterday, eval for fracture  COMPARISON: None.      Impression    IMPRESSION: Hallux valgus and bunion. Moderate degenerative arthritis of the first MTP joint. Mild degenerative arthritis second DIP joint.. No acute fracture. Small subungual exostosis distal phalanx great toe.     *Note: Due to a large number of results and/or encounters for the requested time period, some results have not been displayed. A complete set of results can be found in Results Review.       Discharge Medications   Current Discharge Medication List        START taking these medications    Details   colchicine (COLCRYS) 0.6 MG tablet Take 1 tablet (0.6 mg) by mouth 2 times daily for 5 doses.  Qty: 5 tablet, Refills: 0    Associated Diagnoses: Acute gouty arthritis      predniSONE (DELTASONE) 20 MG tablet Take 2 tablets (40 mg) by mouth daily for 3 days.  Qty: 6 tablet, Refills: 0    Associated Diagnoses: Acute gouty arthritis           CONTINUE these medications which have CHANGED    Details   allopurinol (ZYLOPRIM) 100 MG tablet Take 3 tablets (300 mg) by mouth daily.  Qty: 90 tablet, Refills: 0    Associated Diagnoses: Acute gouty arthritis      pantoprazole (PROTONIX) 40 MG EC tablet Take 1 tablet (40 mg) by mouth every morning (before breakfast) for 10 days.  Qty: 10 tablet, Refills: 0    Associated Diagnoses: Gastroesophageal reflux disease with esophagitis, unspecified whether hemorrhage           CONTINUE these medications which have NOT CHANGED    Details   acetaminophen-codeine (TYLENOL #3) 300-30 MG per tablet Take 1 tablet by mouth every 6 hours as needed for severe pain.  Qty: 10 tablet, Refills: 0    Associated Diagnoses: Right foot pain      calcium polycarbophil (FIBER-LAX) 625 MG tablet Take 1 tablet (625 mg) by mouth daily.  Qty: 90 tablet, Refills: 3    Associated Diagnoses: Slow transit constipation      DULoxetine (CYMBALTA) 60 MG  capsule Take 120 mg by mouth daily       ferrous sulfate (FEROSUL) 325 (65 Fe) MG tablet TAKE 1 TABLET BY MOUTH DAILY WITH BREAKFAST  Qty: 28 tablet, Refills: 11    Comments: Maximum Refills Reached  Associated Diagnoses: Anemia, unspecified type      mirabegron (MYRBETRIQ) 50 MG 24 hr tablet Take 1 tablet by mouth daily      OLANZapine (ZYPREXA) 15 MG tablet Take 15 mg by mouth at bedtime.      oxyBUTYnin ER (DITROPAN XL) 15 MG 24 hr tablet Take 2 tablets (30 mg) by mouth daily  Qty: 60 tablet, Refills: 1    Associated Diagnoses: Overactive bladder      prazosin (MINIPRESS) 1 MG capsule Take 3 mg by mouth At Bedtime      tirzepatide-Weight Management (ZEPBOUND) 12.5 MG/0.5ML prefilled pen Inject 0.5 mLs (12.5 mg) subcutaneously every 7 days.  Qty: 6 mL, Refills: 1    Associated Diagnoses: Class 3 severe obesity due to excess calories with serious comorbidity and body mass index (BMI) of 50.0 to 59.9 in adult (H)           STOP taking these medications       indomethacin (INDOCIN) 50 MG capsule Comments:   Reason for Stopping:             Allergies   Allergies   Allergen Reactions    Other [No Clinical Screening - See Comments] Other (See Comments)     Awoke from anesthesia with anger and ripping off dressing, after interstim placement, outside hospital 2013

## 2025-06-15 NOTE — PLAN OF CARE
PRIMARY DIAGNOSIS: Foot Pain related to Gout  OUTPATIENT/OBSERVATION GOALS TO BE MET BEFORE DISCHARGE:  1. Pain Status: Improved-controlled with oral pain medications.    2. Return to near baseline physical activity: Yes    3. Cleared for discharge by consultants (if involved): Yes    Discharge Planner Nurse   Safe discharge environment identified: Yes  Barriers to discharge: No       Entered by: Drake Rubio RN 06/15/2025 4:17 PM       Patient is A&Ox4 - His VS are stable on room air. Has a left PIV SL - Reports pain as 3/10 this AM as sore/aching - redness and swelling around joint spaces of MCP and warm to touch.       On regular diet - denies any elimination concerns.       Moving SBA to independent. Able to shower independently today and cleared for discharge.       /81 (BP Location: Right arm)   Pulse 84   Temp 97.7  F (36.5  C) (Oral)   Resp 18   Ht 1.829 m (6')   Wt (!) 155 kg (341 lb 11.4 oz)   SpO2 98%   BMI 46.34 kg/m     Please review provider order for any additional goals.   Nurse to notify provider when observation goals have been met and patient is ready for discharge.        Goal Outcome Evaluation:

## 2025-06-15 NOTE — PLAN OF CARE
PRIMARY DIAGNOSIS: Foot Pain related to Gout  OUTPATIENT/OBSERVATION GOALS TO BE MET BEFORE DISCHARGE:  1. Pain Status: Improved-controlled with oral pain medications.    2. Return to near baseline physical activity: Yes    3. Cleared for discharge by consultants (if involved): Yes    Discharge Planner Nurse   Safe discharge environment identified: Yes  Barriers to discharge: No       Entered by: Drake Rubio RN 06/15/2025 4:15 PM       Patient is A&Ox4 - His VS are stable on room air. Has a left PIV SL - Reports pain as 3/10 this AM as sore/aching - redness and swelling around joint spaces of MCP and warm to touch.       On regular diet - denies any elimination concerns.       Moving SBA to independent. Able to shower independently today and cleared for discharge.       /81 (BP Location: Right arm)   Pulse 84   Temp 97.7  F (36.5  C) (Oral)   Resp 18   Ht 1.829 m (6')   Wt (!) 155 kg (341 lb 11.4 oz)   SpO2 98%   BMI 46.34 kg/m     Please review provider order for any additional goals.   Nurse to notify provider when observation goals have been met and patient is ready for discharge.        Goal Outcome Evaluation:

## 2025-06-15 NOTE — PLAN OF CARE
Patient's After Visit Summary was reviewed with patient and/or father.   Patient verbalized understanding of After Visit Summary, recommended follow up and was given an opportunity to ask questions.   Discharge medications sent home with patient/family: YES   Discharged with father            Discharged with his Father - Ok'd by legal co-guardian on chart.       /81 (BP Location: Right arm)   Pulse 84   Temp 97.7  F (36.5  C) (Oral)   Resp 18   Ht 1.829 m (6')   Wt (!) 155 kg (341 lb 11.4 oz)   SpO2 98%   BMI 46.34 kg/m           Goal Outcome Evaluation:

## 2025-06-15 NOTE — CONSULTS
ORTHOPAEDIC SURGERY CONSULTATION NOTE    CONSULTING PROVIDER:  Romero Rossi MD    HISTORY OF PRESENT ILLNESS  Kimo Greenwood is a 44 year old male who presents for evaluation of bilateral ankle pain and swelling.  Patient has a history of gout.  Presented to the emergency room with a 1 day history of swelling and difficulty ambulating.  He is scheduled for total knee arthroplasty later this week with my partner, Dr. Randall.  Denies numbness or tingling distally.  No fevers or chills.    MEDICAL HISTORY  Past Medical History:   Diagnosis Date    Arthritis     DDD hips and knees    Asthma     Asthma     Patel's esophagus     Chronic pain     Chronic pain - left hip/leg pain     degenerative disc disease, back, hips, knees    Gastroesophageal reflux disease     History of anesthesia complications     agitation x1 after interstim 2013    Hypertension     Hypertension     Leukocytosis     LPRD (laryngopharyngeal reflux disease)     Marijuana abuse     Marijuana abuse     MDD (major depressive disorder)     MDD (major depressive disorder)     Mental retardation, mild (I.Q. 50-70)     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    With associated speech/language delay    Obesity 11/11/2010    LOREN (obstructive sleep apnea)     Uses a CPAP    LOREN (obstructive sleep apnea)     Seborrheic dermatitis     Seborrheic dermatitis     Sleep apnea     CPAP         SURGICAL HISTORY  Past Surgical History:   Procedure Laterality Date    ARTHROPLASTY HIP Left 01/25/2017    Procedure: ARTHROPLASTY HIP;  Surgeon: Bala Randall MD;  Location: [facility] OR    ARTHROPLASTY HIP Left     ARTHROPLASTY HIP Right 04/14/2025    Procedure: Right total hip arthroplasty;  Surgeon: Bala Randall MD;  Location: [facility] OR    ARTHROPLASTY REVISION HIP Left 06/24/2019    Procedure: Revision left total hip arthroplasty with a head and liner exchange to a [employer] dual mobility head and liner.;  Surgeon: Bala Randall MD;   Location: [facility] OR    BACK SURGERY      BLADDER SURGERY      Ibarra, [facility],Interstem stimulator implant    CLOSED REDUCTION HIP Left 06/10/2019    Procedure: Closed reduction under general anesthesia left recurrent prosthetic hip dislocation;  Surgeon: Rafat Cazares MD;  Location: [facility] OR    COLONOSCOPY N/A 10/30/2015    Procedure: COMBINED COLONOSCOPY, SINGLE OR MULTIPLE BIOPSY/POLYPECTOMY BY BIOPSY;  Surgeon: Alexis Jackson MD;  Location: [facility] GI    COLONOSCOPY N/A 11/17/2016    Procedure: COMBINED COLONOSCOPY, SINGLE OR MULTIPLE BIOPSY/POLYPECTOMY BY BIOPSY;  Surgeon: Cat Huber MD;  Location: [facility]    COLONOSCOPY N/A 10/28/2020    Procedure: COLONOSCOPY;  Surgeon: You Kraus MD;  Location: [facility] OR    COLONOSCOPY      ESOPHAGOSCOPY, GASTROSCOPY, DUODENOSCOPY (EGD), COMBINED N/A 11/17/2016    Procedure: COMBINED ESOPHAGOSCOPY, GASTROSCOPY, DUODENOSCOPY (EGD), BIOPSY SINGLE OR MULTIPLE;  Surgeon: Cat Huber MD;  Location: [facility]    ESOPHAGOSCOPY, GASTROSCOPY, DUODENOSCOPY (EGD), COMBINED N/A 03/12/2020    Procedure: ESOPHAGOGASTRODUODENOSCOPY WITH BIOPSIES;  Surgeon: You Kraus MD;  Location: [facility]    ESOPHAGOSCOPY, GASTROSCOPY, DUODENOSCOPY (EGD), COMBINED N/A 10/28/2020    Procedure: ESOPHAGOGASTRODUODENOSCOPY, WITH BIOPSY;  Surgeon: You Kraus MD;  Location: [facility]    ESOPHAGOSCOPY, GASTROSCOPY, DUODENOSCOPY (EGD), COMBINED      ESOPHAGOSCOPY, GASTROSCOPY, DUODENOSCOPY (EGD), COMBINED N/A 11/29/2023    Procedure: Esophagoscopy, gastroscopy, duodenoscopy (EGD), combined with biopsies using forceps;  Surgeon: You Kraus MD;  Location: [facility]    EXAM UNDER ANESTHESIA, FULGURATE CONDYLOMA ANUS, COMBINED N/A 12/22/2016    Procedure: COMBINED EXAM UNDER ANESTHESIA, FULGURATE CONDYLOMA ANUS;  Surgeon: Nya Cabello MD;  Location: [facility]    GENITOURINARY SURGERY      JOINT REPLACEMENT Left 01/01/2017    MARGO, Revision Left MARGO    OTHER SURGICAL  HISTORY      interstim stimulator implant    HI CYSTOURETHROSCOPY INJ CHEMODENERVATION BLADDER N/A 01/16/2019    Procedure: CYSTOSCOPY BOTOX BLADDER INJECTIONS (100 UNITS IN 10CC);  Surgeon: Didier Ibarra MD;  Location: [facility];  Service: Urology    HI IMPLANT PERIPH/GASTRIC NEUROSTIM/ N/A 01/08/2020    Procedure: NEW INTERSTIM LEAD, REPLACE OLD; NEW INTERSTIM BATTERY, REPLACE OLD;  Surgeon: Didier Ibarra MD;  Location: [facility];  Service: Urology       CURRENT MEDICATIONS    Current Facility-Administered Medications:     acetaminophen (TYLENOL) tablet 975 mg, 975 mg, Oral, TID, Yasmany Holland MD, 975 mg at 06/15/25 0903    calcium polycarbophil (FIBERCON) tablet 625 mg, 625 mg, Oral, Daily, Ysamany Holland MD    colchicine (COLCRYS) tablet 0.6 mg, 0.6 mg, Oral, BID, Yasmany Holland MD, 0.6 mg at 06/15/25 0905    DULoxetine (CYMBALTA) DR capsule 120 mg, 120 mg, Oral, Daily, Yasmany Holland MD    melatonin tablet 5 mg, 5 mg, Oral, At Bedtime PRN, Yasmany Holland MD, 5 mg at 06/15/25 0116    mirabegron (MYRBETRIQ) 24 hr tablet 50 mg, 50 mg, Oral, Daily, Yasmany Holland MD    OLANZapine (zyPREXA) tablet 15 mg, 15 mg, Oral, At Bedtime, Yasmany Holland MD, 15 mg at 06/14/25 2150    ondansetron (ZOFRAN ODT) ODT tab 4 mg, 4 mg, Oral, Q6H PRN **OR** ondansetron (ZOFRAN) injection 4 mg, 4 mg, Intravenous, Q6H PRN, Yasmany Holland MD    oxyBUTYnin ER (DITROPAN XL) 24 hr tablet 30 mg, 30 mg, Oral, Daily, Yasmany Holland MD    oxyCODONE (ROXICODONE) tablet 5 mg, 5 mg, Oral, Q4H PRN, Yasmany Holland MD, 5 mg at 06/15/25 0116    pantoprazole (PROTONIX) EC tablet 40 mg, 40 mg, Oral, QAM AC, Yasmany Holland MD    prazosin (MINIPRESS) capsule 3 mg, 3 mg, Oral, At Bedtime, Yasmany Holland MD    predniSONE (DELTASONE) tablet 40 mg, 40 mg, Oral, Daily, Yasmany Holland MD, 40 mg at 06/15/25 0904    prochlorperazine  (COMPAZINE) injection 10 mg, 10 mg, Intravenous, Q6H PRN **OR** prochlorperazine (COMPAZINE) tablet 10 mg, 10 mg, Oral, Q6H PRN, Yasmany Holland MD    senna-docusate (SENOKOT-S/PERICOLACE) 8.6-50 MG per tablet 1 tablet, 1 tablet, Oral, BID PRN **OR** senna-docusate (SENOKOT-S/PERICOLACE) 8.6-50 MG per tablet 2 tablet, 2 tablet, Oral, BID PRN, Yasmany Holland MD    SOCIAL HISTORY    Tobacco history is   History   Smoking Status    Former    Types: Cigarettes, Other   Smokeless Tobacco    Former    Types: Chew    Quit date: 2/28/2025   .    REVIEW OF SYSTEMS  Constitutional: Negative for chills, fever, nausea, vomiting.  Objective   VITAL SIGNS  BP   Temp    Pulse   Resp    SpO2    Body mass index is 46.34 kg/m .    PHYSICAL EXAMINATION  Orthopedic Physical Examination_  General Appearance: Patient appears active, comfortable, no acute distress.  NEURO: The patient is alert and oriented to person, place, and time and able to follow commands.  HEENT: Head normocephalic, atraumatic.  CVS: No cyanosis or obvious jugular venous distension .  Resp: No acute respiratory distress or increased inspiratory effort. Musculoskeletal:  Bilateral lower extremities:  Skin clean dry and intact  Moderate knee effusion bilateral ankle  Pitting edema distally  Decreasing erythema  Neurologic: Patient demonstrates active extensor hallucis longus, flexor hallucis longus, tibialis anterior, gastrocsoleus complex.  Intact sensation to light touch through the deep peroneal, superficial peroneal, sural, saphenous, and tibial nerve distributions of the affected leg.    DIAGNOSTICS  IMAGING:  No new imaging obtained this admission      WBC 17  Uric acid 8.6    Assessment & Plan   44-year-old male with gouty arthropathy bilateral ankles    PLAN:  I discussed with [facility] treatment options for his ankle pain.  At this juncture, I think he should be treated for a gouty flare.  Laboratory work consistent with the aforementioned  process.  Unfortunately, he is scheduled for total knee arthroplasty tomorrow.  Plan of care was discussed with his surgeon.  Will likely delay total knee arthroplasty until July to allow for consolidation of his current gouty flare and improve his weightbearing status postoperatively.  Okay for a diet.  Okay to discharge today or tomorrow pending his improvement in ability to mobilize. All patient questions were answered to the best of my ability at this visit. Thank you for consultation of this very pleasant patient    ADMINISTRATIVE/BILLIN minutes were spent in care of this patient greater than 30 of which were spent in counseling, imaging review and coordination of care

## 2025-06-15 NOTE — PHARMACY-ADMISSION MEDICATION HISTORY
Pharmacist Admission Medication History    Admission medication history is complete. The information provided in this note is only as accurate as the sources available at the time of the update.    Information Source(s): Patient and CareEverywhere/SureScripts via in-person    Pertinent Information: Patient received 3 day supply of indomethacin (three times daily with meals scheduled) on 6/5/13 but reports taking a dose of indomethacin on 6/13/25    Changes made to PTA medication list:  Added: None  Deleted: econazole cream, hydrocortisone cream, ketoconazole cream, nystatin-triamcinolone cream, pantoprazole  Changed: None    Allergies reviewed with patient and updates made in EHR: yes    Medication History Completed By: David Wheeler [facility] 6/15/2025 11:00 AM    PTA Med List   Medication Sig Last Dose/Taking    acetaminophen-codeine (TYLENOL #3) 300-30 MG per tablet Take 1 tablet by mouth every 6 hours as needed for severe pain. 6/13/2025 Morning    allopurinol (ZYLOPRIM) 300 MG tablet Take 1 tablet (300 mg) by mouth daily. 6/13/2025 Morning    calcium polycarbophil (FIBER-LAX) 625 MG tablet Take 1 tablet (625 mg) by mouth daily. 6/13/2025 Morning    DULoxetine (CYMBALTA) 60 MG capsule Take 120 mg by mouth daily  6/13/2025 Morning    ferrous sulfate (FEROSUL) 325 (65 Fe) MG tablet TAKE 1 TABLET BY MOUTH DAILY WITH BREAKFAST 6/13/2025 Morning    indomethacin (INDOCIN) 50 MG capsule Take 1 capsule (50 mg) by mouth 3 times daily (with meals). 6/13/2025 Evening    mirabegron (MYRBETRIQ) 50 MG 24 hr tablet Take 1 tablet by mouth daily 6/13/2025 Morning    OLANZapine (ZYPREXA) 15 MG tablet Take 15 mg by mouth at bedtime. 6/13/2025 Bedtime    oxyBUTYnin ER (DITROPAN XL) 15 MG 24 hr tablet Take 2 tablets (30 mg) by mouth daily 6/13/2025 Morning    prazosin (MINIPRESS) 1 MG capsule Take 3 mg by mouth At Bedtime 6/13/2025 Bedtime    tirzepatide-Weight Management (ZEPBOUND) 12.5 MG/0.5ML prefilled pen Inject 0.5 mLs (12.5 mg)  subcutaneously every 7 days. 6/9/2025

## 2025-06-15 NOTE — PLAN OF CARE
"[facility]    ED Boarding Nurse Handoff Addendum Report:    Date/time: 6/14/2025, 10:47 PM    Activity Level: assist of 1 and assist of 2; pt has not been OOB for this RN but was previously independent prior to admission    Fall Risk: Yes:  patient and family education    Active Infusions:  ml/hr    Current Meds Due: see MAR    Current care needs: pain control, IVF    Respiratory status: Room air    Vital signs (within last 30 minutes):    Vitals:    06/14/25 1549 06/14/25 2001 06/14/25 2041 06/14/25 2354   BP: (!) 142/109 129/78 132/83 118/70   BP Location:   Right arm Right arm   Pulse: 116 95 83 72   Resp: 24 18 18 18   Temp: 98.1  F (36.7  C)  98.7  F (37.1  C) 97.5  F (36.4  C)   TempSrc:   Oral Oral   SpO2: 100% 97% 93% 97%       Focused assessment within last 30 minutes:    Pt A&Ox4, calm and cooperative. Pt initially said he is in a \"little pain\" but then rated it 10/10; tylenol given and pt appears comfortable and is resting. He also endorses numbness in both big toes. IVF infusing. Pt has eaten in ED. Pt has not yet voided. Barrier cream applied to groin. VSS. Pt highly diaphoretic following administration of zyprexa; pt is afebrile.     ED Boarding Nurse name: Roselyn Malave RN       "

## 2025-06-15 NOTE — ED NOTES
[facility]  ED Nurse Handoff Report    ED Chief complaint: Foot Pain  . ED Diagnosis:   Final diagnoses:   Gouty arthritis of both ankles       Allergies:   Allergies   Allergen Reactions    Other [No Clinical Screening - See Comments] Other (See Comments)     Awoke from anesthesia with anger and ripping off dressing, after interstim placement, outside hospital 2013       Code Status: Full Code    Activity level - Baseline/Home:  independent.  Activity Level - Current:   standby.   Lift room needed: No.   Bariatric: No   Needed: No   Isolation: No.   Infection: Not Applicable.     Respiratory status: Room air    Vital Signs (within 30 minutes):   Vitals:    06/14/25 1549   BP: (!) 142/109   Pulse: 116   Resp: 24   Temp: 98.1  F (36.7  C)   SpO2: 100%       Cardiac Rhythm:  ,      Pain level:    Patient confused: No.   Patient Falls Risk: patient and family education.   Elimination Status: Has voided     Patient Report - Initial Complaint: Pt presents to ED with bilateral foot pain and redness. The right foot worse than the left. Pt states the pain started on Wednesday, but the swelling and redness showed up yesterday. He was seen at  and they told him it didn't appear like it was gout and was provided with tylenol #3. Pt reports that this feels like when he had gout in the past. The pain meds provided have not been helpful.   Pt here with his care coordinator as his guardians were unable to be here today.   Per coordinator, pt is supposed to have knee surgery with Dr. Mayer on Monday and they would like him contacted about the plan as he is on call this weekend to make sure that the surgery can still be done. .   Focused Assessment: Right hip wound from surgical incision, excoriated groin. 10/10 bilateral feet pain with swelling.      Abnormal Results:   Labs Ordered and Resulted from Time of ED Arrival to Time of ED Departure   BASIC METABOLIC PANEL - Abnormal       Result  Value    Sodium 139      Potassium 3.8      Chloride 101      Carbon Dioxide (CO2) 24      Anion Gap 14      Urea Nitrogen 13.4      Creatinine 1.14      GFR Estimate 82      Calcium 9.3      Glucose 115 (*)    CRP INFLAMMATION - Abnormal    CRP Inflammation 180.86 (*)    CBC WITH PLATELETS AND DIFFERENTIAL - Abnormal    WBC Count 17.4 (*)     RBC Count 4.05 (*)     Hemoglobin 12.1 (*)     Hematocrit 35.6 (*)     MCV 88      MCH 29.9      MCHC 34.0      RDW 12.2      Platelet Count 259      % Neutrophils 84      % Lymphocytes 7      % Monocytes 8      % Eosinophils 0      % Basophils 0      % Immature Granulocytes 0      NRBCs per 100 WBC 0      Absolute Neutrophils 14.6 (*)     Absolute Lymphocytes 1.2      Absolute Monocytes 1.5 (*)     Absolute Eosinophils 0.0      Absolute Basophils 0.1      Absolute Immature Granulocytes 0.1      Absolute NRBCs 0.0          No orders to display       Treatments provided: PRN oxy   Family Comments:  at bedside   OBS brochure/video discussed/provided to patient:  Yes  ED Medications:   Medications   oxyCODONE (ROXICODONE) tablet 5 mg (5 mg Oral $Given 6/14/25 1703)   ketorolac (TORADOL) injection 15 mg (15 mg Intravenous $Given 6/14/25 1729)   methylPREDNISolone Na Suc (solu-MEDROL) injection 125 mg (125 mg Intravenous $Given 6/14/25 1728)   oxyCODONE (ROXICODONE) tablet 5 mg (5 mg Oral $Given 6/14/25 1927)       Drips infusing:  No  For the majority of the shift this patient was Green.   Interventions performed were .    Sepsis treatment initiated: No    Cares/treatment/interventions/medications to be completed following ED care:     ED Nurse Name: Aydee Portillo RN  7:32 PM             RECEIVING UNIT ED HANDOFF REVIEW    Above ED Nurse Handoff Report was reviewed: Yes  Reviewed by: Jasper Cortes RN on Ussan 15, 2025 at 12:13 AM   I Breanne called the ED to inform them the note was read: No

## 2025-06-15 NOTE — PLAN OF CARE
ROOM # 202-2    Living Situation (if not independent, order SW consult):  Facility name: Apartment alone  : Traci (sister)    Activity level at baseline: IND  Activity level on admit: SBA pivot to bed    Who will be transporting you at discharge: sister or care coordinator    Patient registered to observation; given Patient Bill of Rights; given the opportunity to ask questions about observation status and their plan of care.  Patient has been oriented to the observation room, bathroom and call light is in place.    Discussed discharge goals and expectations with patient/family.           Pt is Aox4 and flat affect. IV is infusing NS at 100 mL/hr. Pt pivoted to bed SBA from the transport bed. Pt c/o pain 9/10 but first said that he had no pain - pt was given PRN oxycodone and melatonin. APRIL feet are edematous. Pt has incision on R hip from hip replacement 2 months ago, redness in groin covered with barrier cream. Orthosurg to consult. Continue to follow plan of care.     /70 (BP Location: Right arm)   Pulse 70   Temp 97.6  F (36.4  C) (Oral)   Resp 20   Ht 1.829 m (6')   Wt (!) 155 kg (341 lb 11.4 oz)   SpO2 98%   BMI 46.34 kg/m

## 2025-06-16 ENCOUNTER — PATIENT OUTREACH (OUTPATIENT)
Dept: CARE COORDINATION | Facility: CLINIC | Age: 45
End: 2025-06-16
Payer: COMMERCIAL

## 2025-06-17 ENCOUNTER — PATIENT OUTREACH (OUTPATIENT)
Dept: CARE COORDINATION | Facility: CLINIC | Age: 45
End: 2025-06-17
Payer: COMMERCIAL

## 2025-06-17 NOTE — PROGRESS NOTES
[facility]:   [facility] Contact  [facility]/Voicemail     Clinical Data: Post-Discharge Outreach     Outreach attempted x 2.  Left message on patient's voicemail, providing [facility]'s central phone number of [phone] ([phone]) for questions/concerns and/or to schedule an appt with an [facility] provider, if they do not have a PCP.      Plan:  [facility] will do no further outreaches at this time.       Shannon Garnett  Community Health Worker  [facility], [facility]  [phone]      *Connected Care Resource Team does NOT follow patient ongoing. Referrals are identified based on internal discharge reports and the outreach is to ensure patient has an understanding of their discharge instructions.

## 2025-06-18 ENCOUNTER — PATIENT OUTREACH (OUTPATIENT)
Dept: CARE COORDINATION | Facility: CLINIC | Age: 45
End: 2025-06-18
Payer: COMMERCIAL

## 2025-06-23 ENCOUNTER — TELEPHONE (OUTPATIENT)
Dept: FAMILY MEDICINE | Facility: CLINIC | Age: 45
End: 2025-06-23
Payer: COMMERCIAL

## 2025-06-23 DIAGNOSIS — M1A.0790 CHRONIC GOUT OF FOOT, UNSPECIFIED CAUSE, UNSPECIFIED LATERALITY: Primary | ICD-10-CM

## 2025-06-23 RX ORDER — COLCHICINE 0.6 MG/1
0.6 TABLET ORAL 2 TIMES DAILY
Qty: 10 TABLET | Refills: 0 | Status: SHIPPED | OUTPATIENT
Start: 2025-06-23 | End: 2025-06-28

## 2025-06-23 RX ORDER — COLCHICINE 0.6 MG/1
0.6 TABLET ORAL DAILY
COMMUNITY
End: 2025-06-23

## 2025-06-23 NOTE — TELEPHONE ENCOUNTER
I have send colchicine 0.5 mg bid for 5 day , if not better in 48 hours recommend follow up on Wednesday , we can add him for virtual visit on Thursday , low animal protein diet .     Louise Villalobos MD.    Yes

## 2025-06-23 NOTE — TELEPHONE ENCOUNTER
Crys Care Coordinator calls stating pt is getting his gout in both feet again.     They are red and swollen.     She is asking for refills on his Gout medicine that he had in the hospital.     Looks like he got Prednisone. 40 mg daily for 3 days.    Colchincine 0.6 tablets mg tablet 2 times daily for 5 doses.     He has upcoming appt on 6/30/25 but she doesn't think he can wait that long.     Crys's phone then cut out.     Will route to Dr Villalobos for advise.

## 2025-06-23 NOTE — TELEPHONE ENCOUNTER
Crys called back due to cut off    Wondering if Dr Villalobos wants to see patient sooner or wait until next Monday    [phone] for Crys kuhn for detailed message     [facility] in [place] on [address] is preferred pharmacy    Routing to provider to review and advise.     Joselin Wilson RN   [facility]

## 2025-06-23 NOTE — TELEPHONE ENCOUNTER
Call back to Crys, she is care coordinator. Advised of below message and Rx. We also discussed low purine diet.      Willard does verify he wants to switch to Dr. Villalobos for primary care, previously with Dr. Love. Changed in Epic.   Traci Acosta R.N.

## 2025-06-25 NOTE — TELEPHONE ENCOUNTER
"Situation   Crys called back to give an update. She also asked if was suppose to have another steroid?     Background   Follow up with Dr. Villalobos on 6/30  Took steroid that was prescribed at hospital discharge 6/16-6/19    Assessment     Overall improving      Moderate Ankles swelling still present- swelling had been severe on Monday and was present in both feet and ankles.      A lot less redness than Monday -had been present on feet and ankles   Now only one small quarter size pink area is present on the outside of his left ankle bone.   Area is a little warm.     \"Still hurts a little but a lot less painful than Monday.\"     Crys states she has reiterated to follow low purine diet this week. He was not following that diet last week.     Has been taking colchicine medication  as directed besides missing am dose yesterday.     Future Appointments 6/25/2025 - 12/22/2025        Date Visit Type Length Department Provider     6/30/2025  2:00 PM OFFICE VISIT 30 min  FAMILY PRACTICE Louise Villalobos MD    Location Instructions:     [facility] is located at [id number] [place] [address], about one mile east of the [address] exit off of [address] . To access the parking lot from [address], turn south onto [address]. From [address], turn west onto [address], then north onto [address].              7/23/2025 10:30 AM LAB PERIPHERAL 15 min RH CANCER INFUS [facility] RH LAB DRAW 1    Location Instructions:     Located at: [address], [address] [place] [address]  This appointment is in a hospital-based location.&nbsp; Before your visit, you may want to check with your insurance company for coverage and referral options, including cost differences between services provided in different clinic settings.&nbsp; For more information visit this link on the [employer] Website:&nbsp; [email or web address]              7/23/2025 11:40 AM CT LIVER WO & W CONTRAST 20 min " RH CT SCAN [facility] [id number]    Location Instructions:     [facility] [address] [place] [address]  Parking Imaging customers can park either in the lot to the right side of the driveway (opposite the parking ramp) as you approach the Specialty Care Center, or in the parking ramp.  Entrance and check-in location Enter at the front entrance (pictured to the right). As you enter the lobby, the Imaging Center is on the first floor, just past the elevators. Please check-in for your appointment at the desk in the Imaging Center waiting area, [address]  This appointment is in a hospital-based location.&nbsp; Before your visit, you may want to check with your insurance company for coverage and referral options, including cost differences between services provided in different clinic settings.&nbsp; For more information visit this link on the [employer] Website:&nbsp; [email or web address]              7/31/2025  2:00 PM RETURN CCSL 30 min RH CANCER CL [facility] Meri Chiang MD    Location Instructions:     Located at: [address], [address] [place] [address]  This appointment is in a hospital-based location.&nbsp; Before your visit, you may want to check with your insurance company for coverage and referral options, including cost differences between services provided in different clinic settings.&nbsp; For more information visit this link on the [employer] Website:&nbsp; [email or web address]              8/18/2025  2:00 PM OFFICE VISIT 30 min  FAMILY PRACTICE Louise Villalobos MD    Location Instructions:     [facility] is located at [address] about one mile east of the [address] exit off of [address]. To access the parking lot from [address], turn south onto [address]. From [address], turn west onto [address], then north onto [address].              8/26/2025  2:30 PM  RETURN MEDICAL WEIGHT MGMT 30 min [facility] WEIGHT MGMT Nya Lara PA-C    Location Instructions:     Due to road construction on [address], travel times to this location may be longer than usual. Please plan for extra travel time and check the [place] Department of Transportation [address] project website for delay, closure, and detour information.  The [facility] [place] is in a dense urban area with multiple transportation and parking options. You may wish to review options for  service and self-parking in more detail on the [facility] s website at [email or web address].

## 2025-06-25 NOTE — TELEPHONE ENCOUNTER
Recommend patient continue Colchicine and contact if pain still present after completing prescription , recommend to keep feet elevated .

## 2025-06-25 NOTE — TELEPHONE ENCOUNTER
Spoke with Crys and advised of information below. Expressed understanding and acceptance of the plan. Had no further questions at this time.  Advised can call back to clinic at any time with concerns.     Judy AMADO, RN, PHN

## 2025-06-30 ENCOUNTER — OFFICE VISIT (OUTPATIENT)
Dept: FAMILY MEDICINE | Facility: CLINIC | Age: 45
End: 2025-06-30
Payer: COMMERCIAL

## 2025-06-30 VITALS
HEART RATE: 86 BPM | HEIGHT: 72 IN | TEMPERATURE: 98.2 F | BODY MASS INDEX: 42.66 KG/M2 | WEIGHT: 315 LBS | OXYGEN SATURATION: 97 % | RESPIRATION RATE: 24 BRPM | DIASTOLIC BLOOD PRESSURE: 80 MMHG | SYSTOLIC BLOOD PRESSURE: 130 MMHG

## 2025-06-30 DIAGNOSIS — R60.0 BILATERAL LEG EDEMA: ICD-10-CM

## 2025-06-30 DIAGNOSIS — M10.9 ACUTE GOUTY ARTHRITIS: Primary | ICD-10-CM

## 2025-06-30 PROCEDURE — 3079F DIAST BP 80-89 MM HG: CPT | Performed by: FAMILY MEDICINE

## 2025-06-30 PROCEDURE — 99214 OFFICE O/P EST MOD 30 MIN: CPT | Performed by: FAMILY MEDICINE

## 2025-06-30 PROCEDURE — 3075F SYST BP GE 130 - 139MM HG: CPT | Performed by: FAMILY MEDICINE

## 2025-06-30 PROCEDURE — 1126F AMNT PAIN NOTED NONE PRSNT: CPT | Performed by: FAMILY MEDICINE

## 2025-06-30 RX ORDER — PREDNISONE 20 MG/1
TABLET ORAL
Qty: 20 TABLET | Refills: 0 | Status: SHIPPED | OUTPATIENT
Start: 2025-06-30

## 2025-06-30 RX ORDER — ALLOPURINOL 300 MG/1
300 TABLET ORAL DAILY
Qty: 90 TABLET | Refills: 1 | Status: SHIPPED | OUTPATIENT
Start: 2025-06-30

## 2025-06-30 ASSESSMENT — PAIN SCALES - GENERAL: PAINLEVEL_OUTOF10: NO PAIN (0)

## 2025-06-30 ASSESSMENT — ANXIETY QUESTIONNAIRES: GAD7 TOTAL SCORE: INCOMPLETE

## 2025-06-30 NOTE — PROGRESS NOTES
Assessment & Plan     Acute gouty arthritis  We Discussed diagnosis in detail   We discussed tapering prednisone .   We discussed cutting down animal  protein in the diet .     Discussed to start allopurinol once acute symptoms resolved .       - predniSONE (DELTASONE) 20 MG tablet  Dispense: 20 tablet; Refill: 0  - allopurinol (ZYLOPRIM) 300 MG tablet  Dispense: 90 tablet; Refill: 1    Bilateral leg edema  Discussed leg compression , keeping legs elevated .  - Physical Therapy  Referral    Working on weight loss on [place] , discussed to continue hydration .    Carline Lamar is a 44 year old, presenting for the following health issues:  Arthritis (Pt here to follow up on gout. Pt care coordinator reports he is having a lot swelling but pain has gotten better. )        6/30/2025     1:45 PM   Additional Questions   Roomed by Kristie Shea   Accompanied by Care Coordinator (Andrea)     Arthritis    History of Present Illness       Reason for visit:  Discuss my gout             Review of Systems  CONSTITUTIONAL: NEGATIVE for fever, chills, change in weight  ENT/MOUTH: NEGATIVE for ear, mouth and throat problems  RESP: NEGATIVE for significant cough or SOB  CV: NEGATIVE for chest pain, palpitations or peripheral edema      Objective    /80 (BP Location: Right arm, Patient Position: Sitting, Cuff Size: Adult Large)   Pulse 86   Temp 98.2  F (36.8  C) (Oral)   Resp 24   Ht 1.829 m (6')   Wt (!) 150.3 kg (331 lb 6.4 oz)   SpO2 97%   BMI 44.95 kg/m    Body mass index is 44.95 kg/m .  Physical Exam   GENERAL: alert and no distress  RESP: lungs clear to auscultation - no rales, rhonchi or wheezes  CV: regular rate and rhythm, normal S1 S2, no S3 or S4, no murmur, click or rub, no peripheral edema  ABDOMEN: soft, nontender, no hepatosplenomegaly, no masses and bowel sounds normal  MS: swelling around ankles , bilateral lower feet edema     SKIN: no suspicious lesions or  gadiel  NEURO: Normal strength and tone, mentation intact and speech normal  PSYCH: mentation appears normal, affect normal/bright          Signed Electronically by: Louise Villalobos MD

## 2025-07-05 ENCOUNTER — APPOINTMENT (OUTPATIENT)
Dept: CT IMAGING | Facility: CLINIC | Age: 45
End: 2025-07-05
Attending: EMERGENCY MEDICINE
Payer: COMMERCIAL

## 2025-07-05 ENCOUNTER — HOSPITAL ENCOUNTER (EMERGENCY)
Facility: CLINIC | Age: 45
Discharge: HOME | End: 2025-07-05
Attending: EMERGENCY MEDICINE | Admitting: EMERGENCY MEDICINE
Payer: COMMERCIAL

## 2025-07-05 ENCOUNTER — APPOINTMENT (OUTPATIENT)
Dept: GENERAL RADIOLOGY | Facility: CLINIC | Age: 45
End: 2025-07-05
Attending: EMERGENCY MEDICINE
Payer: COMMERCIAL

## 2025-07-05 VITALS
TEMPERATURE: 99.2 F | HEART RATE: 85 BPM | DIASTOLIC BLOOD PRESSURE: 77 MMHG | RESPIRATION RATE: 18 BRPM | OXYGEN SATURATION: 96 % | SYSTOLIC BLOOD PRESSURE: 127 MMHG

## 2025-07-05 DIAGNOSIS — M25.551 RIGHT HIP PAIN: ICD-10-CM
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
## 2025-07-05 DIAGNOSIS — M96.842 POSTOPERATIVE SEROMA OF MUSCULOSKELETAL STRUCTURE AFTER MUSCULOSKELETAL PROCEDURE: ICD-10-CM

## 2025-07-05 DIAGNOSIS — Z96.641 HISTORY OF TOTAL RIGHT HIP ARTHROPLASTY: ICD-10-CM

## 2025-07-05 PROCEDURE — 73502 X-RAY EXAM HIP UNI 2-3 VIEWS: CPT

## 2025-07-05 PROCEDURE — 99285 EMERGENCY DEPT VISIT HI MDM: CPT | Mod: 25 | Performed by: EMERGENCY MEDICINE

## 2025-07-05 PROCEDURE — 73700 CT LOWER EXTREMITY W/O DYE: CPT | Mod: RT

## 2025-07-05 PROCEDURE — 250N000013 HC RX MED GY IP 250 OP 250 PS 637: Performed by: EMERGENCY MEDICINE

## 2025-07-05 PROCEDURE — 96372 THER/PROPH/DIAG INJ SC/IM: CPT | Performed by: EMERGENCY MEDICINE

## 2025-07-05 PROCEDURE — 250N000011 HC RX IP 250 OP 636: Performed by: EMERGENCY MEDICINE

## 2025-07-05 RX ORDER — KETOROLAC TROMETHAMINE 15 MG/ML
15 INJECTION, SOLUTION INTRAMUSCULAR; INTRAVENOUS ONCE
Status: COMPLETED | OUTPATIENT
Start: 2025-07-05 | End: 2025-07-05

## 2025-07-05 RX ORDER — KETOROLAC TROMETHAMINE 30 MG/ML
30 INJECTION, SOLUTION INTRAMUSCULAR; INTRAVENOUS ONCE
Status: COMPLETED | OUTPATIENT
Start: 2025-07-05 | End: 2025-07-05

## 2025-07-05 RX ORDER — KETOROLAC TROMETHAMINE 15 MG/ML
15 INJECTION, SOLUTION INTRAMUSCULAR; INTRAVENOUS ONCE
Status: DISCONTINUED | OUTPATIENT
Start: 2025-07-05 | End: 2025-07-05

## 2025-07-05 RX ORDER — OXYCODONE HYDROCHLORIDE 5 MG/1
5 TABLET ORAL ONCE
Refills: 0 | Status: COMPLETED | OUTPATIENT
Start: 2025-07-05 | End: 2025-07-05

## 2025-07-05 RX ADMIN — KETOROLAC TROMETHAMINE 15 MG: 15 INJECTION, SOLUTION INTRAMUSCULAR; INTRAVENOUS at 14:09

## 2025-07-05 RX ADMIN — OXYCODONE HYDROCHLORIDE 5 MG: 5 TABLET ORAL at 14:09

## 2025-07-05 RX ADMIN — OXYCODONE HYDROCHLORIDE 5 MG: 5 TABLET ORAL at 13:09

## 2025-07-05 RX ADMIN — KETOROLAC TROMETHAMINE 30 MG: 30 INJECTION, SOLUTION INTRAMUSCULAR at 16:38

## 2025-07-05 ASSESSMENT — ACTIVITIES OF DAILY LIVING (ADL)
ADLS_ACUITY_SCORE: 59

## 2025-07-05 ASSESSMENT — COLUMBIA-SUICIDE SEVERITY RATING SCALE - C-SSRS
1. IN THE PAST MONTH, HAVE YOU WISHED YOU WERE DEAD OR WISHED YOU COULD GO TO SLEEP AND NOT WAKE UP?: NO
6. HAVE YOU EVER DONE ANYTHING, STARTED TO DO ANYTHING, OR PREPARED TO DO ANYTHING TO END YOUR LIFE?: NO
2. HAVE YOU ACTUALLY HAD ANY THOUGHTS OF KILLING YOURSELF IN THE PAST MONTH?: NO

## 2025-07-05 NOTE — ED TRIAGE NOTES
Pt had hip replacement x2 months ago- slipped today at a gas station onto right side. Now 10/10 pain.      Triage Assessment (Adult)       Row Name 07/05/25 1242          Triage Assessment    Airway WDL WDL        Respiratory WDL    Respiratory WDL WDL        Cardiac WDL    Cardiac WDL WDL        Cognitive/Neuro/Behavioral WDL    Cognitive/Neuro/Behavioral WDL WDL

## 2025-07-05 NOTE — ED PROVIDER NOTES
Emergency Department Note      History of Present Illness     Chief Complaint   Hip Pain      HPI   Kimo Greenwood is a 44 year old male presenting to the ER accompanied by his father and mother for evaluation of a fall and hip pain.  Patient underwent right hip replacement approximately 2 months previous for arthritis.  He reports he has been progressing well postoperatively.  Earlier today, he was in the gas station while his father was filling up their vehicle.  He reportedly slipped and fell landing directly on his right hip.  He was able to slowly pull himself up and was able to place some weight gingerly to get back to the car.  He denies injury to any other location.  Specifically, he denies any head injury nor loss of consciousness.  He has not taken any analgesia prior to my evaluation.    Independent Historian   Father present at bedside.    Review of External Notes   Hospital discharge summary reviewed from 6/15/2025 where patient was discharged with gout    Past Medical History     Medical History and Problem List   Past Medical History:   Diagnosis Date    Arthritis     Asthma     Asthma     Patel's esophagus     Chronic pain     Chronic pain - left hip/leg pain     Gastroesophageal reflux disease     History of anesthesia complications     Hypertension     Hypertension     Leukocytosis     LPRD (laryngopharyngeal reflux disease)     Marijuana abuse     Marijuana abuse     MDD (major depressive disorder)     MDD (major depressive disorder)     Mental retardation, mild (I.Q. 50-70)     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    Obesity 11/11/2010    LOREN (obstructive sleep apnea)     LOREN (obstructive sleep apnea)     Seborrheic dermatitis     Seborrheic dermatitis     Sleep apnea        Medications   allopurinol (ZYLOPRIM) 300 MG tablet  calcium polycarbophil (FIBER-LAX) 625 MG tablet  DULoxetine (CYMBALTA) 60 MG capsule  ferrous sulfate (FEROSUL) 325 (65 Fe) MG tablet  mirabegron  (MYRBETRIQ) 50 MG 24 hr tablet  OLANZapine (ZYPREXA) 15 MG tablet  oxyBUTYnin ER (DITROPAN XL) 15 MG 24 hr tablet  prazosin (MINIPRESS) 1 MG capsule  predniSONE (DELTASONE) 20 MG tablet  tirzepatide-Weight Management (ZEPBOUND) 12.5 MG/0.5ML prefilled pen        Surgical History   Past Surgical History:   Procedure Laterality Date    ARTHROPLASTY HIP Left 01/25/2017    Procedure: ARTHROPLASTY HIP;  Surgeon: Bala Randall MD;  Location: [facility] OR    ARTHROPLASTY HIP Left     ARTHROPLASTY HIP Right 04/14/2025    Procedure: Right total hip arthroplasty;  Surgeon: Bala Randall MD;  Location: [facility] OR    ARTHROPLASTY REVISION HIP Left 06/24/2019    Procedure: Revision left total hip arthroplasty with a head and liner exchange to a Biomet dual mobility head and liner.;  Surgeon: Bala Randall MD;  Location: [facility] OR    BACK SURGERY      BLADDER SURGERY      Ibarra, [facility],Interstem stimulator implant    CLOSED REDUCTION HIP Left 06/10/2019    Procedure: Closed reduction under general anesthesia left recurrent prosthetic hip dislocation;  Surgeon: Rafat Cazares MD;  Location:  OR    COLONOSCOPY N/A 10/30/2015    Procedure: COMBINED COLONOSCOPY, SINGLE OR MULTIPLE BIOPSY/POLYPECTOMY BY BIOPSY;  Surgeon: Alexis Jackson MD;  Location:  GI    COLONOSCOPY N/A 11/17/2016    Procedure: COMBINED COLONOSCOPY, SINGLE OR MULTIPLE BIOPSY/POLYPECTOMY BY BIOPSY;  Surgeon: Cat Huber MD;  Location:  GI    COLONOSCOPY N/A 10/28/2020    Procedure: COLONOSCOPY;  Surgeon: You Kraus MD;  Location:  OR    COLONOSCOPY      ESOPHAGOSCOPY, GASTROSCOPY, DUODENOSCOPY (EGD), COMBINED N/A 11/17/2016    Procedure: COMBINED ESOPHAGOSCOPY, GASTROSCOPY, DUODENOSCOPY (EGD), BIOPSY SINGLE OR MULTIPLE;  Surgeon: Cat Huber MD;  Location:  GI    ESOPHAGOSCOPY, GASTROSCOPY, DUODENOSCOPY (EGD), COMBINED N/A 03/12/2020    Procedure: ESOPHAGOGASTRODUODENOSCOPY WITH  BIOPSIES;  Surgeon: You Kraus MD;  Location: [facility] OR    ESOPHAGOSCOPY, GASTROSCOPY, DUODENOSCOPY (EGD), COMBINED N/A 10/28/2020    Procedure: ESOPHAGOGASTRODUODENOSCOPY, WITH BIOPSY;  Surgeon: You Kraus MD;  Location: [facility] OR    ESOPHAGOSCOPY, GASTROSCOPY, DUODENOSCOPY (EGD), COMBINED      ESOPHAGOSCOPY, GASTROSCOPY, DUODENOSCOPY (EGD), COMBINED N/A 11/29/2023    Procedure: Esophagoscopy, gastroscopy, duodenoscopy (EGD), combined with biopsies using forceps;  Surgeon: You Kraus MD;  Location: [facility] GI    EXAM UNDER ANESTHESIA, FULGURATE CONDYLOMA ANUS, COMBINED N/A 12/22/2016    Procedure: COMBINED EXAM UNDER ANESTHESIA, FULGURATE CONDYLOMA ANUS;  Surgeon: Nya Cabello MD;  Location: [facility] OR    GENITOURINARY SURGERY      JOINT REPLACEMENT Left 01/01/2017    MARGO, Revision Left MARGO    OTHER SURGICAL HISTORY      interstim stimulator implant    LA CYSTOURETHROSCOPY INJ CHEMODENERVATION BLADDER N/A 01/16/2019    Procedure: CYSTOSCOPY BOTOX BLADDER INJECTIONS (100 UNITS IN 10CC);  Surgeon: Didier Ibarra MD;  Location: [facility] OR;  Service: Urology    LA IMPLANT PERIPH/GASTRIC NEUROSTIM/ N/A 01/08/2020    Procedure: NEW INTERSTIM LEAD, REPLACE OLD; NEW INTERSTIM BATTERY, REPLACE OLD;  Surgeon: Didier Ibarra MD;  Location: [facility] OR;  Service: Urology       Physical Exam     Patient Vitals for the past 24 hrs:   BP Temp Temp src Pulse Resp SpO2   07/05/25 1301 (!) 145/88 -- -- 94 -- 98 %   07/05/25 1250 (!) 147/82 -- -- 96 -- 98 %   07/05/25 1243 (!) 148/78 99.2  F (37.3  C) Oral 102 18 99 %     Physical Exam  General:   Well-nourished   Speaking in full sentences  Eyes:   Conjunctiva without injection or scleral icterus  Resp:   Even, non-labored respirations  CV:    Regular rate and rhythm  GI:   Abdomen soft, non-tender  Skin:   Warm, dry, well perfused   No rashes or open wounds on exposed skin  MSK:   RLE:   TTP over greater trochanteric region of right hip    No  gluteal tenderness or hematoma   Well healed surgical scar to lateral aspect of hip   No leg length discrepancy   Log roll intact, exacerbates pain   Passive hip flexion exacerbates pain  Neuro:   Alert   Answers questions appropriately   Moves all extremities equally  Psych:   Normal affect, normal mood        Diagnostics     Lab Results   Labs Ordered and Resulted from Time of ED Arrival to Time of ED Departure - No data to display    Imaging   XR Pelvis and Hip Right 1 View   Final Result   IMPRESSION: Bilateral total hip replacements. Dedicated views of the right hip show no evidence of an acute displaced fracture or dislocation. Right sacral stimulator device. Arthritic changes pubic symphysis.      CT Hip Right w/o Contrast    (Results Pending)     Independent Interpretation   I independently reviewed patient's x-ray and see no evidence of acute fracture or dislocation.    ED Course      Medications Administered   Medications   oxyCODONE (ROXICODONE) tablet 5 mg (5 mg Oral $Given 7/5/25 1309)   oxyCODONE (ROXICODONE) tablet 5 mg (5 mg Oral $Given 7/5/25 1409)   ketorolac (TORADOL) injection 15 mg (15 mg Intramuscular $Given 7/5/25 1409)       Procedures   Procedures     Discussion of Management   None    ED Course   ED Course as of 07/05/25 1435   Sat Jul 05, 2025   1345 Patient reevaluated.  X-ray results discussed.  Attempt at trial of ambulation failed.   1434 Patient re-evaluated.  Able to put a little more weight on leg.  Will obtain CT to evaluate occult fracture.         Additional Documentation  None    Medical Decision Making / Diagnosis     CMS Diagnoses: None    MIPS   None               [facility]   Kimo Greenwood is a 44 year old male with a PMH significant for recent right hip arthroplasty presenting to the ED accompanied by family members including mother and father for evaluation of a fall and right hip pain.  History consistent with a mechanical fall.  Skeletal survey with localized injury to the  right hip.  Initial radiographs negative for dislocation, hardware malfunction, or periprosthetic fracture.  Patient denies any other injuries including head injury, neck pain, chest or abdominal pain or injury to any other extremity.  No evidence of gluteal hematoma.  Analgesia provided, and patient continued to remain quite tender though overall is improving slightly.  Will plan further evaluation with CT of the hip to evaluate for possible occult fracture not visualized on x-ray.  Should this return unremarkable and patient continue to remain weightbearing, anticipate patient would be appropriate for discharge, weightbearing as tolerated, use of his walker at home to assist with ambulation, and close follow-up with orthopedics team.  Should CT demonstrate evidence of an occult fracture, patient would likely require hospital admission and orthopedics consultation.  Patient was signed out to my colleague of the evening shift pending CT results.    Disposition   Sign-out to evening provider pending CT scan and disposition    Diagnosis     ICD-10-CM    1. Fall, initial encounter  W19.XXXA       2. Right hip pain  M25.551       3. History of total right hip arthroplasty  Z96.641           MD Shalini Alberts Brian Donald, MD  07/05/25 [id number]

## 2025-07-05 NOTE — DISCHARGE INSTRUCTIONS
Please continue to monitor your symptoms closely.  Continue with anti-inflammatory medications as needed.  I would recommend weightbearing as tolerated and use a walker to assist with symptom control.  Ice can help reduce swelling and inflammation as well.    For pain: you can take tylenol 1,000mg every 6 hours and/or ibuprofen 600mg every 6 hours. You can use oxycodone for breakthrough pain.     Follow-up with your orthopedic team if symptoms fail to improve in the coming days.    Return to the ER with worsening pain, falls, fever > 100.4, or any other concerns.

## 2025-07-07 ENCOUNTER — HOSPITAL ENCOUNTER (OUTPATIENT)
Facility: CLINIC | Age: 45
End: 2025-07-07
Attending: ORTHOPAEDIC SURGERY | Admitting: ORTHOPAEDIC SURGERY
Payer: COMMERCIAL

## 2025-07-09 ENCOUNTER — APPOINTMENT (OUTPATIENT)
Dept: URBAN - METROPOLITAN AREA CLINIC 253 | Age: 45
Setting detail: DERMATOLOGY
End: 2025-07-10

## 2025-07-09 ENCOUNTER — TELEPHONE (OUTPATIENT)
Dept: FAMILY MEDICINE | Facility: CLINIC | Age: 45
End: 2025-07-09
Payer: COMMERCIAL

## 2025-07-09 VITALS — RESPIRATION RATE: 14 BRPM | HEIGHT: 72 IN | WEIGHT: 315 LBS

## 2025-07-09 DIAGNOSIS — D18.0 HEMANGIOMA: ICD-10-CM

## 2025-07-09 DIAGNOSIS — L21.8 OTHER SEBORRHEIC DERMATITIS: ICD-10-CM

## 2025-07-09 PROBLEM — D18.01 HEMANGIOMA OF SKIN AND SUBCUTANEOUS TISSUE: Status: ACTIVE | Noted: 2025-07-09

## 2025-07-09 PROCEDURE — OTHER COUNSELING: OTHER

## 2025-07-09 PROCEDURE — OTHER MIPS QUALITY: OTHER

## 2025-07-09 PROCEDURE — OTHER PRESCRIPTION: OTHER

## 2025-07-09 PROCEDURE — 99214 OFFICE O/P EST MOD 30 MIN: CPT | Mod: 25

## 2025-07-09 PROCEDURE — OTHER BENIGN DESTRUCTION: OTHER

## 2025-07-09 PROCEDURE — 17110 DESTRUCT B9 LESION 1-14: CPT

## 2025-07-09 RX ORDER — HYDROCORTISONE 25 MG/G
2.5% CREAM TOPICAL BID
Qty: 30 | Refills: 2 | Status: ERX

## 2025-07-09 RX ORDER — KETOCONAZOLE 20 MG/ML
2% SHAMPOO, SUSPENSION TOPICAL QD-BID
Qty: 120 | Refills: 2 | Status: ERX

## 2025-07-09 RX ORDER — KETOCONAZOLE 20 MG/G
2% CREAM TOPICAL BID
Qty: 30 | Refills: 2 | Status: ERX

## 2025-07-09 ASSESSMENT — LOCATION DETAILED DESCRIPTION DERM
LOCATION DETAILED: NASAL TIP
LOCATION DETAILED: RIGHT MEDIAL FRONTAL SCALP
LOCATION DETAILED: LEFT CENTRAL MALAR CHEEK
LOCATION DETAILED: RIGHT CENTRAL MALAR CHEEK

## 2025-07-09 ASSESSMENT — LOCATION SIMPLE DESCRIPTION DERM
LOCATION SIMPLE: LEFT CHEEK
LOCATION SIMPLE: RIGHT SCALP
LOCATION SIMPLE: RIGHT CHEEK
LOCATION SIMPLE: NOSE

## 2025-07-09 ASSESSMENT — LOCATION ZONE DERM
LOCATION ZONE: FACE
LOCATION ZONE: NOSE
LOCATION ZONE: SCALP

## 2025-07-09 NOTE — TELEPHONE ENCOUNTER
Pt's caregiver calling pt is just finishing up prednisone.   Was improving but today some ankle swelling she believes this is related to warm weather as he is not having any pain and it does improve some with elevation.     They will begin the allopurinol once prednisone is completed unless status changes.     FYI to PCP     Eliana Concepcion RN

## 2025-07-10 ENCOUNTER — TELEPHONE (OUTPATIENT)
Dept: FAMILY MEDICINE | Facility: CLINIC | Age: 45
End: 2025-07-10
Payer: COMMERCIAL

## 2025-07-10 DIAGNOSIS — M25.551 HIP PAIN, RIGHT: Primary | ICD-10-CM

## 2025-07-10 RX ORDER — OXYCODONE HYDROCHLORIDE 5 MG/1
5 TABLET ORAL EVERY 6 HOURS PRN
Qty: 12 TABLET | Refills: 0 | Status: SHIPPED | OUTPATIENT
Start: 2025-07-10 | End: 2025-07-13

## 2025-07-10 NOTE — TELEPHONE ENCOUNTER
"Crys calling back stating patient fell last Saturday on his right side where he had his hip replacement. Patient is in pain and she states patient is requesting more OXY. She states he is taking ibuprophen/tylenol combo that is not really working.  Crys states patient is \"barely taking the OXY so we don't have to worry about addiction or anything\"  "

## 2025-07-10 NOTE — TELEPHONE ENCOUNTER
"Crys calling to see if able to have uric acid level completed as pt bilateral feet and ankles are \"quite swollen\" still today. Wants to find out if it is related to gout or not.    Please review and advise if okay. If okay, please call Crys to schedule a lab appointment for tomorrow.    Judy AMADO, RN, PHN    "

## 2025-07-10 NOTE — TELEPHONE ENCOUNTER
Left vague message advising if failure to improve or worsens, pt needs to be seen in clinic.    Judy CHONGN, RN, PHN

## 2025-07-10 NOTE — TELEPHONE ENCOUNTER
As patient has recent labs no additional labs are recommended .  Diagnosis is clinical , If swelling fail to improve recommend clinical exam .

## 2025-07-10 NOTE — TELEPHONE ENCOUNTER
Reason for Call:  Appointment Request    Patient requesting this type of appt:  Ankle and foot swelling, persistent gout issues,     Requested provider: Louise Villalobos    Reason patient unable to be scheduled: Not within requested timeframe    When does patient want to be seen/preferred time: Same Day or Next day    Comments: Patient had an appointment scheduled with Dr Villalobos tomorrow 07/11/25 but he is refusing to come that early and they are wondering if he can be fit in sometime later tomorrow.     Could we send this information to you in [facility] or would you prefer to receive a phone call?:   Patient would prefer a phone call   Okay to leave a detailed message?: Yes at Other phone number:  [phone]    Call taken on 7/10/2025 at 2:36 PM by Dennise Odonnell

## 2025-07-10 NOTE — TELEPHONE ENCOUNTER
Send oxycodone for severe pain only , medication for severe pain only for short term  , will not be able to refill

## 2025-07-16 ENCOUNTER — OFFICE VISIT (OUTPATIENT)
Dept: URGENT CARE | Facility: URGENT CARE | Age: 45
End: 2025-07-16
Payer: COMMERCIAL

## 2025-07-16 VITALS
HEART RATE: 86 BPM | DIASTOLIC BLOOD PRESSURE: 77 MMHG | RESPIRATION RATE: 20 BRPM | TEMPERATURE: 98.2 F | OXYGEN SATURATION: 98 % | BODY MASS INDEX: 44.89 KG/M2 | WEIGHT: 315 LBS | SYSTOLIC BLOOD PRESSURE: 119 MMHG

## 2025-07-16 DIAGNOSIS — L03.115 CELLULITIS OF HIP, RIGHT: Primary | ICD-10-CM

## 2025-07-16 PROCEDURE — 99214 OFFICE O/P EST MOD 30 MIN: CPT | Performed by: FAMILY MEDICINE

## 2025-07-16 PROCEDURE — 3074F SYST BP LT 130 MM HG: CPT | Performed by: FAMILY MEDICINE

## 2025-07-16 PROCEDURE — 3078F DIAST BP <80 MM HG: CPT | Performed by: FAMILY MEDICINE

## 2025-07-16 NOTE — PROGRESS NOTES
SUBJECTIVE:  Chief Complaint   Patient presents with    Hip Pain     Follow up visit 7/5 and 7/11for fall on right hip-given doxycycline still has sx-warm to touch, swollen, painful,draining-has [facility] appt tomorrow     Kimo Greenwood is a 44 year old male who presents with a chief complaint of right hip painful, drainage.  Here with Care Coordinator    Reviewed prior ER evaluation 7/5, CT with seroma  Reviewed prior primary care follow up on 7/11     Was seen at [facility] urgent care 7/11 and given RX Doxycycline.  Has appointment tomorrow, here today as developed drainage from site for the past 2 days, no improvement of redness and out of oxycodone for pain management.    No fever    Past Medical History:   Diagnosis Date    Arthritis     DDD hips and knees    Asthma     Asthma     Patel's esophagus     Chronic pain     Chronic pain - left hip/leg pain     degenerative disc disease, back, hips, knees    Gastroesophageal reflux disease     History of anesthesia complications     agitation x1 after interstim 2013    Hypertension     Hypertension     Leukocytosis     LPRD (laryngopharyngeal reflux disease)     Marijuana abuse     Marijuana abuse     MDD (major depressive disorder)     MDD (major depressive disorder)     Mental retardation, mild (I.Q. 50-70)     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    With associated speech/language delay    Obesity 11/11/2010    LOREN (obstructive sleep apnea)     Uses a CPAP    LOREN (obstructive sleep apnea)     Seborrheic dermatitis     Seborrheic dermatitis     Sleep apnea     CPAP     Current Outpatient Medications   Medication Sig Dispense Refill    calcium polycarbophil (FIBER-LAX) 625 MG tablet Take 1 tablet (625 mg) by mouth daily. 90 tablet 3    DULoxetine (CYMBALTA) 60 MG capsule Take 120 mg by mouth daily       ferrous sulfate (FEROSUL) 325 (65 Fe) MG tablet TAKE 1 TABLET BY MOUTH DAILY WITH BREAKFAST 28 tablet 11    mirabegron (MYRBETRIQ) 50 MG 24 hr tablet Take 1  tablet by mouth daily      OLANZapine (ZYPREXA) 15 MG tablet Take 15 mg by mouth at bedtime.      oxyBUTYnin ER (DITROPAN XL) 15 MG 24 hr tablet Take 2 tablets (30 mg) by mouth daily 60 tablet 1    prazosin (MINIPRESS) 1 MG capsule Take 3 mg by mouth At Bedtime      tirzepatide-Weight Management (ZEPBOUND) 12.5 MG/0.5ML prefilled pen Inject 0.5 mLs (12.5 mg) subcutaneously every 7 days. 6 mL 1    allopurinol (ZYLOPRIM) 300 MG tablet Take 1 tablet (300 mg) by mouth daily. (Patient not taking: Reported on 2025) 90 tablet 1    predniSONE (DELTASONE) 20 MG tablet Take 3 tabs by mouth daily x 3 days, then 2 tabs daily x 3days, then 1 tab daily x 3 days, then 1/2 tab daily x 3 days. (Patient not taking: Reported on 2025) 20 tablet 0     Social History     Tobacco Use    Smoking status: Former     Current packs/day: 0.00     Average packs/day: 1 pack/day for 1 year (1.0 ttl pk-yrs)     Types: Cigarettes, Other     Quit date: 2025     Years since quittin.3     Passive exposure: Current    Smokeless tobacco: Former     Types: Chew     Quit date: 2025    Tobacco comments:     Switched to an ecig with 0% nicotine. Current Marijuana Used.    Substance Use Topics    Alcohol use: Yes     Comment: socially       ROS:  Review of systems negative except as stated above.    EXAM:   /77 (BP Location: Right arm, Patient Position: Sitting, Cuff Size: Adult Large)   Pulse 86   Temp 98.2  F (36.8  C) (Tympanic)   Resp 20   Wt (!) 150.1 kg (331 lb)   SpO2 98%   BMI 44.89 kg/m    GENERAL APPEARANCE: healthy, alert and no distress, sitting in wheelchair  SKIN: right hip/buttock - large patch, swollen, warmth, indurated and tenderness, small opening with yellowish scant discharge  PSYCH:alert, affect flat      ASSESSMENT/PLAN:  (L03.115) Cellulitis of hip, right  (primary encounter diagnosis)  Plan: amoxicillin-clavulanate (AUGMENTIN) 875-125 MG         tablet            S/p right hip arthroplasty, recent  fall with persistent pain.  Reviewed prior visits notes, discussed that would anticipate that antibiotic should have improved symptoms after 48 hours of use and would be acceptable to switch antibiotic at this time - new RX Augmentin given.  Reviewed importance of orthopedic involvement in this management, discussed that as currently draining to allow this to continue.  Deferred narcotic pain medication to primary provider or [facility], will not prescribe this in UC today.    Follow up with [facility] tomorrow.    Dylan Whyte MD  July 16, 2025 1:44 PM

## 2025-07-16 NOTE — PROGRESS NOTES
Urgent Care Clinic Visit    Chief Complaint   Patient presents with    Hip Pain     Follow up visit 7/5 and 7/11for fall on right hip-given doxycycline still has sx-warm to touch, swollen, painful,draining-has [facility] appt tomorrow                7/16/2025    12:41 PM   Additional Questions   Roomed by Bhavya ANDRE   Accompanied by care coordinator         7/16/2025    12:41 PM   Patient Reported Additional Medications   Patient reports taking the following new medications DOXYCYCLINE HYC 50MG, oxycodone

## 2025-07-19 ENCOUNTER — APPOINTMENT (OUTPATIENT)
Dept: GENERAL RADIOLOGY | Facility: CLINIC | Age: 45
DRG: 464 | End: 2025-07-19
Attending: STUDENT IN AN ORGANIZED HEALTH CARE EDUCATION/TRAINING PROGRAM
Payer: COMMERCIAL

## 2025-07-19 ENCOUNTER — HOSPITAL ENCOUNTER (INPATIENT)
Facility: CLINIC | Age: 45
DRG: 464 | End: 2025-07-19
Attending: EMERGENCY MEDICINE | Admitting: ORTHOPAEDIC SURGERY
Payer: COMMERCIAL

## 2025-07-19 ENCOUNTER — APPOINTMENT (OUTPATIENT)
Dept: CT IMAGING | Facility: CLINIC | Age: 45
DRG: 464 | End: 2025-07-19
Attending: STUDENT IN AN ORGANIZED HEALTH CARE EDUCATION/TRAINING PROGRAM
Payer: COMMERCIAL

## 2025-07-19 ENCOUNTER — TELEPHONE (OUTPATIENT)
Dept: NURSING | Facility: CLINIC | Age: 45
End: 2025-07-19
Payer: COMMERCIAL

## 2025-07-19 ENCOUNTER — APPOINTMENT (OUTPATIENT)
Dept: GENERAL RADIOLOGY | Facility: CLINIC | Age: 45
DRG: 464 | End: 2025-07-19
Attending: COMMUNITY HEALTH WORKER
Payer: COMMERCIAL

## 2025-07-19 DIAGNOSIS — R45.86 MOOD CHANGE: ICD-10-CM

## 2025-07-19 DIAGNOSIS — T84.50XA INFECTION OF PROSTHETIC JOINT, INITIAL ENCOUNTER: ICD-10-CM

## 2025-07-19 DIAGNOSIS — Z96.641 S/P TOTAL RIGHT HIP ARTHROPLASTY: Primary | ICD-10-CM

## 2025-07-19 DIAGNOSIS — L02.415 ABSCESS OF RIGHT HIP: ICD-10-CM

## 2025-07-19 LAB
ALBUMIN SERPL BCG-MCNC: 3.1 G/DL (ref 3.5–5.2)
ALP SERPL-CCNC: 67 U/L (ref 40–150)
ALT SERPL W P-5'-P-CCNC: 21 U/L (ref 0–70)
ANION GAP SERPL CALCULATED.3IONS-SCNC: 9 MMOL/L (ref 7–15)
AST SERPL W P-5'-P-CCNC: 15 U/L (ref 0–45)
BASOPHILS # BLD AUTO: 0.1 10E3/UL (ref 0–0.2)
BASOPHILS NFR BLD AUTO: 0 %
BILIRUB SERPL-MCNC: 0.3 MG/DL
BUN SERPL-MCNC: 11.2 MG/DL (ref 6–20)
CALCIUM SERPL-MCNC: 9 MG/DL (ref 8.8–10.4)
CHLORIDE SERPL-SCNC: 103 MMOL/L (ref 98–107)
CREAT SERPL-MCNC: 1.15 MG/DL (ref 0.67–1.17)
CRP SERPL-MCNC: 130.08 MG/L
EGFRCR SERPLBLD CKD-EPI 2021: 81 ML/MIN/1.73M2
EOSINOPHIL # BLD AUTO: 0.2 10E3/UL (ref 0–0.7)
EOSINOPHIL NFR BLD AUTO: 1 %
ERYTHROCYTE [DISTWIDTH] IN BLOOD BY AUTOMATED COUNT: 12.8 % (ref 10–15)
ERYTHROCYTE [SEDIMENTATION RATE] IN BLOOD BY WESTERGREN METHOD: 76 MM/HR (ref 0–15)
GLUCOSE SERPL-MCNC: 80 MG/DL (ref 70–99)
HCO3 BLDV-SCNC: 28 MMOL/L (ref 21–28)
HCO3 SERPL-SCNC: 26 MMOL/L (ref 22–29)
HCT VFR BLD AUTO: 31 % (ref 40–53)
HGB BLD-MCNC: 10.2 G/DL (ref 13.3–17.7)
HOLD SPECIMEN: NORMAL
HOLD SPECIMEN: NORMAL
IMM GRANULOCYTES # BLD: 0.1 10E3/UL
IMM GRANULOCYTES NFR BLD: 1 %
INR PPP: 1.09 (ref 0.85–1.15)
LACTATE BLD-SCNC: 0.8 MMOL/L (ref 0.7–2)
LYMPHOCYTES # BLD AUTO: 1.9 10E3/UL (ref 0.8–5.3)
LYMPHOCYTES NFR BLD AUTO: 11 %
MCH RBC QN AUTO: 27.9 PG (ref 26.5–33)
MCHC RBC AUTO-ENTMCNC: 32.9 G/DL (ref 31.5–36.5)
MCV RBC AUTO: 85 FL (ref 78–100)
MONOCYTES # BLD AUTO: 1 10E3/UL (ref 0–1.3)
MONOCYTES NFR BLD AUTO: 6 %
NEUTROPHILS # BLD AUTO: 14.4 10E3/UL (ref 1.6–8.3)
NEUTROPHILS NFR BLD AUTO: 81 %
NRBC # BLD AUTO: 0 10E3/UL
NRBC BLD AUTO-RTO: 0 /100
PCO2 BLDV: 44 MM HG (ref 40–50)
PH BLDV: 7.41 [PH] (ref 7.32–7.43)
PLATELET # BLD AUTO: 344 10E3/UL (ref 150–450)
PO2 BLDV: 28 MM HG (ref 25–47)
POTASSIUM SERPL-SCNC: 3.9 MMOL/L (ref 3.4–5.3)
PROT SERPL-MCNC: 6.8 G/DL (ref 6.4–8.3)
PROTHROMBIN TIME: 14.2 SECONDS (ref 11.8–14.8)
RBC # BLD AUTO: 3.66 10E6/UL (ref 4.4–5.9)
SAO2 % BLDV: 52 % (ref 70–75)
SODIUM SERPL-SCNC: 138 MMOL/L (ref 135–145)
WBC # BLD AUTO: 17.7 10E3/UL (ref 4–11)

## 2025-07-19 PROCEDURE — 73610 X-RAY EXAM OF ANKLE: CPT | Mod: 50

## 2025-07-19 PROCEDURE — 36415 COLL VENOUS BLD VENIPUNCTURE: CPT | Performed by: EMERGENCY MEDICINE

## 2025-07-19 PROCEDURE — 94660 CPAP INITIATION&MGMT: CPT

## 2025-07-19 PROCEDURE — 87040 BLOOD CULTURE FOR BACTERIA: CPT | Performed by: EMERGENCY MEDICINE

## 2025-07-19 PROCEDURE — 85652 RBC SED RATE AUTOMATED: CPT | Performed by: STUDENT IN AN ORGANIZED HEALTH CARE EDUCATION/TRAINING PROGRAM

## 2025-07-19 PROCEDURE — 120N000001 HC R&B MED SURG/OB

## 2025-07-19 PROCEDURE — 86140 C-REACTIVE PROTEIN: CPT | Performed by: STUDENT IN AN ORGANIZED HEALTH CARE EDUCATION/TRAINING PROGRAM

## 2025-07-19 PROCEDURE — 80053 COMPREHEN METABOLIC PANEL: CPT | Performed by: EMERGENCY MEDICINE

## 2025-07-19 PROCEDURE — 99285 EMERGENCY DEPT VISIT HI MDM: CPT | Mod: 25 | Performed by: EMERGENCY MEDICINE

## 2025-07-19 PROCEDURE — 85025 COMPLETE CBC W/AUTO DIFF WBC: CPT | Performed by: EMERGENCY MEDICINE

## 2025-07-19 PROCEDURE — 73630 X-RAY EXAM OF FOOT: CPT | Mod: LT

## 2025-07-19 PROCEDURE — 258N000003 HC RX IP 258 OP 636: Performed by: INTERNAL MEDICINE

## 2025-07-19 PROCEDURE — 250N000013 HC RX MED GY IP 250 OP 250 PS 637: Performed by: INTERNAL MEDICINE

## 2025-07-19 PROCEDURE — 250N000009 HC RX 250: Performed by: STUDENT IN AN ORGANIZED HEALTH CARE EDUCATION/TRAINING PROGRAM

## 2025-07-19 PROCEDURE — 99223 1ST HOSP IP/OBS HIGH 75: CPT | Performed by: INTERNAL MEDICINE

## 2025-07-19 PROCEDURE — 258N000003 HC RX IP 258 OP 636: Performed by: EMERGENCY MEDICINE

## 2025-07-19 PROCEDURE — 250N000011 HC RX IP 250 OP 636: Performed by: STUDENT IN AN ORGANIZED HEALTH CARE EDUCATION/TRAINING PROGRAM

## 2025-07-19 PROCEDURE — 999N000157 HC STATISTIC RCP TIME EA 10 MIN

## 2025-07-19 PROCEDURE — 73701 CT LOWER EXTREMITY W/DYE: CPT | Mod: RT

## 2025-07-19 PROCEDURE — 250N000013 HC RX MED GY IP 250 OP 250 PS 637: Performed by: STUDENT IN AN ORGANIZED HEALTH CARE EDUCATION/TRAINING PROGRAM

## 2025-07-19 PROCEDURE — 82803 BLOOD GASES ANY COMBINATION: CPT

## 2025-07-19 PROCEDURE — 73502 X-RAY EXAM HIP UNI 2-3 VIEWS: CPT

## 2025-07-19 PROCEDURE — 85610 PROTHROMBIN TIME: CPT | Performed by: COMMUNITY HEALTH WORKER

## 2025-07-19 RX ORDER — LOPERAMIDE HYDROCHLORIDE 2 MG/1
2 CAPSULE ORAL 4 TIMES DAILY PRN
Status: ON HOLD | COMMUNITY

## 2025-07-19 RX ORDER — HYDROMORPHONE HCL IN WATER/PF 6 MG/30 ML
0.4 PATIENT CONTROLLED ANALGESIA SYRINGE INTRAVENOUS
Status: DISCONTINUED | OUTPATIENT
Start: 2025-07-19 | End: 2025-07-21 | Stop reason: ALTCHOICE

## 2025-07-19 RX ORDER — IOPAMIDOL 755 MG/ML
100 INJECTION, SOLUTION INTRAVASCULAR ONCE
Status: COMPLETED | OUTPATIENT
Start: 2025-07-19 | End: 2025-07-19

## 2025-07-19 RX ORDER — PLANT STANOL ESTER 450 MG
2.5 TABLET ORAL DAILY
Status: ON HOLD | COMMUNITY

## 2025-07-19 RX ORDER — OXYCODONE HYDROCHLORIDE 5 MG/1
5 TABLET ORAL EVERY 4 HOURS PRN
Status: DISCONTINUED | OUTPATIENT
Start: 2025-07-19 | End: 2025-07-20

## 2025-07-19 RX ORDER — MULTIVITAMIN WITH IRON
1 TABLET ORAL DAILY
Status: ON HOLD | COMMUNITY

## 2025-07-19 RX ORDER — DULOXETIN HYDROCHLORIDE 60 MG/1
120 CAPSULE, DELAYED RELEASE ORAL DAILY
Status: DISCONTINUED | OUTPATIENT
Start: 2025-07-19 | End: 2025-07-28 | Stop reason: HOSPADM

## 2025-07-19 RX ORDER — KETOCONAZOLE 20 MG/ML
SHAMPOO, SUSPENSION TOPICAL DAILY PRN
Status: ON HOLD | COMMUNITY

## 2025-07-19 RX ORDER — POLYETHYLENE GLYCOL 3350 17 G/17G
17 POWDER, FOR SOLUTION ORAL 2 TIMES DAILY PRN
Status: DISCONTINUED | OUTPATIENT
Start: 2025-07-19 | End: 2025-07-22 | Stop reason: ALTCHOICE

## 2025-07-19 RX ORDER — FERROUS SULFATE 325(65) MG
325 TABLET ORAL
Status: DISCONTINUED | OUTPATIENT
Start: 2025-07-20 | End: 2025-07-28 | Stop reason: HOSPADM

## 2025-07-19 RX ORDER — ACETAMINOPHEN 325 MG/1
975 TABLET ORAL 4 TIMES DAILY
Status: DISCONTINUED | OUTPATIENT
Start: 2025-07-19 | End: 2025-07-21 | Stop reason: ALTCHOICE

## 2025-07-19 RX ORDER — HYDROCORTISONE 25 MG/G
CREAM TOPICAL 2 TIMES DAILY
Status: ON HOLD | COMMUNITY

## 2025-07-19 RX ORDER — HYDROXYZINE HYDROCHLORIDE 50 MG/1
50 TABLET, FILM COATED ORAL EVERY 6 HOURS PRN
Status: DISCONTINUED | OUTPATIENT
Start: 2025-07-19 | End: 2025-07-22 | Stop reason: ALTCHOICE

## 2025-07-19 RX ORDER — HYDROXYZINE HYDROCHLORIDE 25 MG/1
25 TABLET, FILM COATED ORAL EVERY 6 HOURS PRN
Status: DISCONTINUED | OUTPATIENT
Start: 2025-07-19 | End: 2025-07-22 | Stop reason: ALTCHOICE

## 2025-07-19 RX ORDER — AMOXICILLIN 250 MG
1 CAPSULE ORAL 2 TIMES DAILY PRN
Status: DISCONTINUED | OUTPATIENT
Start: 2025-07-19 | End: 2025-07-22 | Stop reason: ALTCHOICE

## 2025-07-19 RX ORDER — PANTOPRAZOLE SODIUM 40 MG/1
40 TABLET, DELAYED RELEASE ORAL
Status: DISCONTINUED | OUTPATIENT
Start: 2025-07-20 | End: 2025-07-28 | Stop reason: HOSPADM

## 2025-07-19 RX ORDER — HYDROMORPHONE HCL IN WATER/PF 6 MG/30 ML
0.2 PATIENT CONTROLLED ANALGESIA SYRINGE INTRAVENOUS
Status: DISCONTINUED | OUTPATIENT
Start: 2025-07-19 | End: 2025-07-21 | Stop reason: ALTCHOICE

## 2025-07-19 RX ORDER — LIDOCAINE 40 MG/G
CREAM TOPICAL
Status: DISCONTINUED | OUTPATIENT
Start: 2025-07-19 | End: 2025-07-22

## 2025-07-19 RX ORDER — NALOXONE HYDROCHLORIDE 0.4 MG/ML
0.2 INJECTION, SOLUTION INTRAMUSCULAR; INTRAVENOUS; SUBCUTANEOUS
Status: DISCONTINUED | OUTPATIENT
Start: 2025-07-19 | End: 2025-07-28 | Stop reason: HOSPADM

## 2025-07-19 RX ORDER — MIRABEGRON 50 MG/1
50 TABLET, FILM COATED, EXTENDED RELEASE ORAL DAILY
Status: DISCONTINUED | OUTPATIENT
Start: 2025-07-19 | End: 2025-07-28 | Stop reason: HOSPADM

## 2025-07-19 RX ORDER — CALCIUM CARBONATE 500 MG/1
1000 TABLET, CHEWABLE ORAL 4 TIMES DAILY PRN
Status: DISCONTINUED | OUTPATIENT
Start: 2025-07-19 | End: 2025-07-28 | Stop reason: HOSPADM

## 2025-07-19 RX ORDER — KETOCONAZOLE 20 MG/G
CREAM TOPICAL 2 TIMES DAILY
Status: ON HOLD | COMMUNITY

## 2025-07-19 RX ORDER — OXYBUTYNIN CHLORIDE 5 MG/1
30 TABLET, EXTENDED RELEASE ORAL DAILY
Status: DISCONTINUED | OUTPATIENT
Start: 2025-07-19 | End: 2025-07-28 | Stop reason: HOSPADM

## 2025-07-19 RX ORDER — NALOXONE HYDROCHLORIDE 0.4 MG/ML
0.4 INJECTION, SOLUTION INTRAMUSCULAR; INTRAVENOUS; SUBCUTANEOUS
Status: DISCONTINUED | OUTPATIENT
Start: 2025-07-19 | End: 2025-07-28 | Stop reason: HOSPADM

## 2025-07-19 RX ORDER — PRAZOSIN HYDROCHLORIDE 1 MG/1
3 CAPSULE ORAL AT BEDTIME
Status: DISCONTINUED | OUTPATIENT
Start: 2025-07-19 | End: 2025-07-28 | Stop reason: HOSPADM

## 2025-07-19 RX ORDER — SODIUM CHLORIDE, SODIUM LACTATE, POTASSIUM CHLORIDE, CALCIUM CHLORIDE 600; 310; 30; 20 MG/100ML; MG/100ML; MG/100ML; MG/100ML
INJECTION, SOLUTION INTRAVENOUS CONTINUOUS
Status: DISCONTINUED | OUTPATIENT
Start: 2025-07-19 | End: 2025-07-26

## 2025-07-19 RX ORDER — AMOXICILLIN 250 MG
2 CAPSULE ORAL 2 TIMES DAILY PRN
Status: DISCONTINUED | OUTPATIENT
Start: 2025-07-19 | End: 2025-07-22 | Stop reason: ALTCHOICE

## 2025-07-19 RX ORDER — ALLOPURINOL 300 MG/1
300 TABLET ORAL DAILY
Status: DISCONTINUED | OUTPATIENT
Start: 2025-07-19 | End: 2025-07-28 | Stop reason: HOSPADM

## 2025-07-19 RX ORDER — OXYCODONE HYDROCHLORIDE 5 MG/1
5 TABLET ORAL ONCE
Refills: 0 | Status: COMPLETED | OUTPATIENT
Start: 2025-07-19 | End: 2025-07-19

## 2025-07-19 RX ORDER — HYDROXYZINE PAMOATE 25 MG/1
25-50 CAPSULE ORAL 4 TIMES DAILY PRN
Status: ON HOLD | COMMUNITY
End: 2025-07-22

## 2025-07-19 RX ORDER — PIPERACILLIN SODIUM, TAZOBACTAM SODIUM 4; .5 G/20ML; G/20ML
4.5 INJECTION, POWDER, LYOPHILIZED, FOR SOLUTION INTRAVENOUS ONCE
Status: DISCONTINUED | OUTPATIENT
Start: 2025-07-19 | End: 2025-07-19

## 2025-07-19 RX ADMIN — HYDROXYZINE HYDROCHLORIDE 25 MG: 25 TABLET, FILM COATED ORAL at 19:04

## 2025-07-19 RX ADMIN — ACETAMINOPHEN 975 MG: 325 TABLET ORAL at 17:14

## 2025-07-19 RX ADMIN — DULOXETINE 120 MG: 60 CAPSULE, DELAYED RELEASE ORAL at 19:04

## 2025-07-19 RX ADMIN — OXYCODONE HYDROCHLORIDE 5 MG: 5 TABLET ORAL at 21:20

## 2025-07-19 RX ADMIN — OLANZAPINE 15 MG: 10 TABLET, FILM COATED ORAL at 21:22

## 2025-07-19 RX ADMIN — IOPAMIDOL 100 ML: 755 INJECTION, SOLUTION INTRAVENOUS at 15:11

## 2025-07-19 RX ADMIN — OXYCODONE HYDROCHLORIDE 5 MG: 5 TABLET ORAL at 17:14

## 2025-07-19 RX ADMIN — SODIUM CHLORIDE 65 ML: 9 INJECTION, SOLUTION INTRAVENOUS at 15:11

## 2025-07-19 RX ADMIN — PRAZOSIN HYDROCHLORIDE 3 MG: 1 CAPSULE ORAL at 21:26

## 2025-07-19 RX ADMIN — OXYCODONE HYDROCHLORIDE 5 MG: 5 TABLET ORAL at 14:52

## 2025-07-19 RX ADMIN — ALLOPURINOL 300 MG: 300 TABLET ORAL at 19:04

## 2025-07-19 RX ADMIN — MIRABEGRON 50 MG: 50 TABLET, EXTENDED RELEASE ORAL at 21:22

## 2025-07-19 RX ADMIN — SODIUM CHLORIDE, SODIUM LACTATE, POTASSIUM CHLORIDE, AND CALCIUM CHLORIDE: .6; .31; .03; .02 INJECTION, SOLUTION INTRAVENOUS at 18:31

## 2025-07-19 RX ADMIN — ACETAMINOPHEN 975 MG: 325 TABLET ORAL at 21:20

## 2025-07-19 RX ADMIN — SODIUM CHLORIDE 1000 ML: 0.9 INJECTION, SOLUTION INTRAVENOUS at 14:51

## 2025-07-19 RX ADMIN — OXYBUTYNIN CHLORIDE 30 MG: 5 TABLET, EXTENDED RELEASE ORAL at 21:23

## 2025-07-19 ASSESSMENT — ACTIVITIES OF DAILY LIVING (ADL)
ADLS_ACUITY_SCORE: 37
ADLS_ACUITY_SCORE: 59
DEPENDENT_IADLS:: CLEANING;COOKING;LAUNDRY;SHOPPING;MEAL PREPARATION;MEDICATION MANAGEMENT;MONEY MANAGEMENT
ADLS_ACUITY_SCORE: 60
ADLS_ACUITY_SCORE: 38
ADLS_ACUITY_SCORE: 38
ADLS_ACUITY_SCORE: 60
ADLS_ACUITY_SCORE: 60
ADLS_ACUITY_SCORE: 39

## 2025-07-19 ASSESSMENT — COLUMBIA-SUICIDE SEVERITY RATING SCALE - C-SSRS
2. HAVE YOU ACTUALLY HAD ANY THOUGHTS OF KILLING YOURSELF IN THE PAST MONTH?: NO
6. HAVE YOU EVER DONE ANYTHING, STARTED TO DO ANYTHING, OR PREPARED TO DO ANYTHING TO END YOUR LIFE?: NO
1. IN THE PAST MONTH, HAVE YOU WISHED YOU WERE DEAD OR WISHED YOU COULD GO TO SLEEP AND NOT WAKE UP?: NO

## 2025-07-19 NOTE — ED TRIAGE NOTES
Arrives by EMS. Patient reports foot pain that started this morning. Hx of gout. Right hip replacement 2 months ago. Patient states that he fell yesterday and is now having increased pain in his hip.

## 2025-07-19 NOTE — ED PROVIDER NOTES
Emergency Department Note      History of Present Illness     Chief Complaint   Hip Pain and Foot Pain      HPI   Kimo Greenwood is a 44 year old male history MDD, LOREN, mild mental retardation presents with right hip pain and bilateral ankle swelling.  Mother is legal guardian and assisting in patient history and consent via telephone. Patient had a total right hip replacement in April, has been battling with infections and complications ever since.  Has failed doxycycline and Augmentin.  Pain and difficulty weightbearing.  Says he fell last night on right side, denies hitting his head.  Mother was unaware of this fall.  This morning woke up with bilateral ankle swelling and right toe pain.  Has a history of gout and is unsure if this is gout related.  Denies fever, chest pain, shortness of breath, cough, GI symptoms.  Patient's main concern is pain in his big toe and bilateral ankles.  Mother's main concern is right hip infection. Has not had anything to eat yet today, only some water.    Independent Historian   Mother as detailed above over telephone.    Review of External Notes   7/5/25, 7/16/25     Past Medical History     Medical History and Problem List   Past Medical History:   Diagnosis Date    Arthritis     Asthma     Asthma     Patel's esophagus     Chronic pain     Chronic pain - left hip/leg pain     Gastroesophageal reflux disease     History of anesthesia complications     Hypertension     Hypertension     Leukocytosis     LPRD (laryngopharyngeal reflux disease)     Marijuana abuse     Marijuana abuse     MDD (major depressive disorder)     MDD (major depressive disorder)     Mental retardation, mild (I.Q. 50-70)     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    Obesity 11/11/2010    LOREN (obstructive sleep apnea)     LOREN (obstructive sleep apnea)     Seborrheic dermatitis     Seborrheic dermatitis     Sleep apnea        Medications   allopurinol (ZYLOPRIM) 300 MG  tablet  amoxicillin-clavulanate (AUGMENTIN) 875-125 MG tablet  calcium polycarbophil (FIBER-LAX) 625 MG tablet  DULoxetine (CYMBALTA) 60 MG capsule  ferrous sulfate (FEROSUL) 325 (65 Fe) MG tablet  mirabegron (MYRBETRIQ) 50 MG 24 hr tablet  OLANZapine (ZYPREXA) 15 MG tablet  oxyBUTYnin ER (DITROPAN XL) 15 MG 24 hr tablet  prazosin (MINIPRESS) 1 MG capsule  predniSONE (DELTASONE) 20 MG tablet  tirzepatide-Weight Management (ZEPBOUND) 12.5 MG/0.5ML prefilled pen        Surgical History   Past Surgical History:   Procedure Laterality Date    ARTHROPLASTY HIP Left 01/25/2017    Procedure: ARTHROPLASTY HIP;  Surgeon: Bala Randall MD;  Location: [facility] OR    ARTHROPLASTY HIP Left     ARTHROPLASTY HIP Right 04/14/2025    Procedure: Right total hip arthroplasty;  Surgeon: Bala Randall MD;  Location: [facility] OR    ARTHROPLASTY REVISION HIP Left 06/24/2019    Procedure: Revision left total hip arthroplasty with a head and liner exchange to a Biomet dual mobility head and liner.;  Surgeon: Bala Randall MD;  Location:  OR    BACK SURGERY      BLADDER SURGERY      Ibarra, [facility],Interstem stimulator implant    CLOSED REDUCTION HIP Left 06/10/2019    Procedure: Closed reduction under general anesthesia left recurrent prosthetic hip dislocation;  Surgeon: Rafat Cazares MD;  Location:  OR    COLONOSCOPY N/A 10/30/2015    Procedure: COMBINED COLONOSCOPY, SINGLE OR MULTIPLE BIOPSY/POLYPECTOMY BY BIOPSY;  Surgeon: Alexis Jackson MD;  Location:  GI    COLONOSCOPY N/A 11/17/2016    Procedure: COMBINED COLONOSCOPY, SINGLE OR MULTIPLE BIOPSY/POLYPECTOMY BY BIOPSY;  Surgeon: Cat Huber MD;  Location:  GI    COLONOSCOPY N/A 10/28/2020    Procedure: COLONOSCOPY;  Surgeon: You Kraus MD;  Location:  OR    COLONOSCOPY      ESOPHAGOSCOPY, GASTROSCOPY, DUODENOSCOPY (EGD), COMBINED N/A 11/17/2016    Procedure: COMBINED ESOPHAGOSCOPY, GASTROSCOPY, DUODENOSCOPY (EGD),  BIOPSY SINGLE OR MULTIPLE;  Surgeon: Cat Huber MD;  Location: [facility] GI    ESOPHAGOSCOPY, GASTROSCOPY, DUODENOSCOPY (EGD), COMBINED N/A 03/12/2020    Procedure: ESOPHAGOGASTRODUODENOSCOPY WITH BIOPSIES;  Surgeon: You Kraus MD;  Location: [facility] OR    ESOPHAGOSCOPY, GASTROSCOPY, DUODENOSCOPY (EGD), COMBINED N/A 10/28/2020    Procedure: ESOPHAGOGASTRODUODENOSCOPY, WITH BIOPSY;  Surgeon: You Kraus MD;  Location: [facility] OR    ESOPHAGOSCOPY, GASTROSCOPY, DUODENOSCOPY (EGD), COMBINED      ESOPHAGOSCOPY, GASTROSCOPY, DUODENOSCOPY (EGD), COMBINED N/A 11/29/2023    Procedure: Esophagoscopy, gastroscopy, duodenoscopy (EGD), combined with biopsies using forceps;  Surgeon: You Kraus MD;  Location: [facility] GI    EXAM UNDER ANESTHESIA, FULGURATE CONDYLOMA ANUS, COMBINED N/A 12/22/2016    Procedure: COMBINED EXAM UNDER ANESTHESIA, FULGURATE CONDYLOMA ANUS;  Surgeon: Nya Cabello MD;  Location: [facility] OR    GENITOURINARY SURGERY      JOINT REPLACEMENT Left 01/01/2017    MARGO, Revision Left MARGO    OTHER SURGICAL HISTORY      interstim stimulator implant    VA CYSTOURETHROSCOPY INJ CHEMODENERVATION BLADDER N/A 01/16/2019    Procedure: CYSTOSCOPY BOTOX BLADDER INJECTIONS (100 UNITS IN 10CC);  Surgeon: Didier Ibarra MD;  Location: [facility];  Service: Urology    VA IMPLANT PERIPH/GASTRIC NEUROSTIM/ N/A 01/08/2020    Procedure: NEW INTERSTIM LEAD, REPLACE OLD; NEW INTERSTIM BATTERY, REPLACE OLD;  Surgeon: Didier Ibarra MD;  Location: [facility] Main OR;  Service: Urology       Physical Exam     Patient Vitals for the past 24 hrs:   BP Temp Temp src Pulse Resp SpO2   07/19/25 1445 139/74 -- -- 81 -- 95 %   07/19/25 1430 -- -- -- -- -- 100 %   07/19/25 1415 -- -- -- -- -- 99 %   07/19/25 1400 100/72 -- -- 79 -- 98 %   07/19/25 1331 132/74 96.9  F (36.1  C) Temporal 80 16 100 %     Physical Exam  General: Nontoxic appearing  Head:  The scalp, face, and head appear normal  Eyes:  The  pupils are equal, round, and reactive to light  Neck:  Normal range of motion  CV:  Normal rate  Normal rhythm   Resp:  Lungs are clear  GI:  Abdomen is soft, there is no rigidity  MS:  Normal muscular tone    Bilateral ankle edema, right more than left   Bilateral shin bruising  Left foot bruising over 4th toe  Skin:  Skin is warm and dry  Right hip 15 cm X 3 cm area of fluctuance with surrounding erythema and warmth, serosanguinous drainage (see photo below)  Neuro:  Speech is normal and fluent, there is no aphasia  Psych: Awake. Alert.      Normal affect      Diagnostics     Lab Results   Labs Ordered and Resulted from Time of ED Arrival to Time of ED Departure   CBC WITH PLATELETS AND DIFFERENTIAL - Abnormal       Result Value    WBC Count 17.7 (*)     RBC Count 3.66 (*)     Hemoglobin 10.2 (*)     Hematocrit 31.0 (*)     MCV 85      MCH 27.9      MCHC 32.9      RDW 12.8      Platelet Count 344      % Neutrophils 81      % Lymphocytes 11      % Monocytes 6      % Eosinophils 1      % Basophils 0      % Immature Granulocytes 1      NRBCs per 100 WBC 0      Absolute Neutrophils 14.4 (*)     Absolute Lymphocytes 1.9      Absolute Monocytes 1.0      Absolute Eosinophils 0.2      Absolute Basophils 0.1      Absolute Immature Granulocytes 0.1      Absolute NRBCs 0.0     ISTAT GASES LACTATE VENOUS POCT - Abnormal    Lactic Acid POCT 0.8      Bicarbonate Venous POCT 28      O2 Sat, Venous POCT 52 (*)     pCO2 Venous POCT 44      pH Venous POCT 7.41      pO2 Venous POCT 28     COMPREHENSIVE METABOLIC PANEL   CRP INFLAMMATION   ERYTHROCYTE SEDIMENTATION RATE AUTO   BLOOD CULTURE   BLOOD CULTURE       Imaging   XR Ankle Bilateral G/E 3 Views    (Results Pending)   Foot XR, G/E 3 views, left    (Results Pending)   CT Hip Right w/o & w Contrast    (Results Pending)     Independent Interpretation   None    ED Course      Medications Administered   Medications   sodium chloride (PF) 0.9% PF flush 3 mL (has no administration in  time range)   sodium chloride (PF) 0.9% PF flush 3 mL (3 mLs Intracatheter $Given 7/19/25 1453)   sodium chloride 0.9% BOLUS 1,000 mL (1,000 mLs Intravenous $New Bag 7/19/25 1451)   oxyCODONE (ROXICODONE) tablet 5 mg (5 mg Oral $Given 7/19/25 1452)       Procedures   Procedures     Discussion of Management   Orthopedics, Selene KLINE    ED Course   ED Course as of 07/19/25 1454   Sat Jul 19, 2025   1400 I evaluated patient, obtained history and performed physical exam     1407 Discussed patient case with ortho-Selene KLINE       Additional Documentation  None    Medical Decision Making / Diagnosis     CMS Diagnoses: None    MIPS   None               [facility]   Kimo Greenwood is a 44 year old male history MDD, LOREN, mild mental retardation presents with right hip pain and bilateral ankle swelling. Considered sepsis, gout, fracture, among others. Remained vitally stable and afebrile in ED. Physical exam concerning for underlying infection of right hip versus septic joint. Sepsis labs drawn as precaution. Given IV fluids and oxycodone for pain. Discussed case with Ortho; hold IV antibiotics as patient is nontoxic, will get an aspiration and treat based on results. Xrays and CT pending at time of admission.      Disposition   The patient was admitted to the hospital.     Diagnosis     ICD-10-CM    1. Abscess of right hip  L02.415     s/p hip replacement           Discharge Medications   New Prescriptions    No medications on file         ELENI Pacheco Allison, PA-C  07/19/25 8922

## 2025-07-19 NOTE — PHARMACY-ADMISSION MEDICATION HISTORY
Pharmacist Admission Medication History    Admission medication history is complete. The information provided in this note is only as accurate as the sources available at the time of the update.    Information Source(s): Patient, Family member, and CareEverywhere/SureScripts via in-person    Pertinent Information: Willard recently completed a course of doxycycline 300 mg twice daily. He was prescribed Augmentin 875-125 mg but was told not to start it by a surgeon therefore he did not start it.     Changes made to PTA medication list:  Added: hydrocortisone cream, ketoconazole cream, ketoconazole shampoo, hydroxyzine pamoate, potassium, vit D, magnesium, imodium  Deleted: prednisone (complete)  Changed: None    Allergies reviewed with patient and updates made in EHR: yes    Medication History Completed By: Mart Metzger [facility] 7/19/2025 6:36 PM    PTA Med List   Medication Sig Last Dose/Taking    allopurinol (ZYLOPRIM) 300 MG tablet Take 1 tablet (300 mg) by mouth daily. 7/18/2025    calcium polycarbophil (FIBER-LAX) 625 MG tablet Take 1 tablet (625 mg) by mouth daily. 7/18/2025    cholecalciferol (VITAMIN D3) 10 mcg (400 units) TABS tablet Take 10 mcg by mouth daily. 7/18/2025    DULoxetine (CYMBALTA) 60 MG capsule Take 120 mg by mouth daily  7/18/2025    ferrous sulfate (FEROSUL) 325 (65 Fe) MG tablet TAKE 1 TABLET BY MOUTH DAILY WITH BREAKFAST 7/18/2025 Morning    hydrocortisone 2.5 % cream Apply topically 2 times daily. Apply a thin layer to face once daily for up to 2 weeks, decrease use as rash improves, repeat as needed for flares 7/18/2025    hydrOXYzine leandro (VISTARIL) 25 MG capsule Take 25-50 mg by mouth 4 times daily as needed for other (pain). Unknown    ketoconazole (NIZORAL) 2 % external cream Apply topically 2 times daily. Apply to rash on face twice daily until improved for seborrheic dermatitis Unknown    ketoconazole (NIZORAL) 2 % external shampoo Apply topically daily as needed for itching or  irritation. 7/18/2025    loperamide (IMODIUM) 2 MG capsule Take 2 mg by mouth 4 times daily as needed for diarrhea. 7/18/2025    magnesium 250 MG tablet Take 1 tablet by mouth daily. 7/18/2025    mirabegron (MYRBETRIQ) 50 MG 24 hr tablet Take 1 tablet by mouth daily 7/18/2025    OLANZapine (ZYPREXA) 15 MG tablet Take 15 mg by mouth at bedtime. 7/18/2025 Bedtime    oxyBUTYnin ER (DITROPAN XL) 15 MG 24 hr tablet Take 2 tablets (30 mg) by mouth daily 7/18/2025    potassium gluconate 2.5 MEQ tablet Take 2.5 mEq by mouth daily. 7/18/2025    prazosin (MINIPRESS) 1 MG capsule Take 3 mg by mouth At Bedtime 7/18/2025 Evening    tirzepatide-Weight Management (ZEPBOUND) 12.5 MG/0.5ML prefilled pen Inject 0.5 mLs (12.5 mg) subcutaneously every 7 days. 7/14/2025

## 2025-07-19 NOTE — ED PROVIDER NOTES
ED APC SUPERVISION NOTE:   I evaluated this patient in conjunction with Stephanie Quiroz PA-C  I have participated in the care of the patient and personally performed key elements of the history, exam, and medical decision making.      HPI:   Kimo Greenwood is a 44 year old male with history of depression, obstructive sleep apnea, mild mental retardation who presents emergency department for evaluation of right hip pain and bilateral ankle swelling.  Patient had total hip replacement in April, has been dealing with possible infections of the surgical site and completed courses of doxycycline and Augmentin.  Patient reports that he cannot walk secondary to pain.  He reports that he fell on his right side yesterday but denies hitting his head.  He woke up with some bilateral ankle swelling and right toe pain, does have a history of gout.  Denies any fever, chest pain, shortness of breath, headache, cough, or neck or back pain.    Independent Historian:   Mother provided some of the above history and permission to treat the patient    Review of External Notes: Urgent care note reviewed from 7/16/2025, he was given doxycycline at [facility] care 7/11, prescribed Augmentin on 7/16/2025     EXAM:   GENERAL: Awake, alert  CARDIOVASCULAR: Regular rate and rhythm  LUNGS: Clear bilaterally, no wheezes rales or rhonchi  ABDOMEN: Soft, nontender, no rebound or guarding  EXTREMITIES: Tender to palpation of right hip, patient has pain on passively internally or externally rotating the right hip, he has a 16 x 6 cm fluctuant area over the surgical site, weeping yellow drainage, he has bilateral ankle swelling, no erythema or warmth, ecchymosis over right shin and left fourth toe with tenderness to palpation  NEURO: Awake and alert, can wiggle toes bilaterally, cannot lift the right leg off the bed secondary to pain      Independent Interpretation (X-rays, CTs, rhythm strip):  None    Consultations/Discussion of Management or  Tests:  Discussed with Ortho PA and admitting hospitalist, Dr. Ruiz     MIPS:      None    MEDICAL DECISION MAKING/ASSESSMENT AND PLAN:   44-year-old male who presents to the emergency department with obvious abscess over surgical site on right hip.  Patient has normal vital signs here.  He has normal lactate.  He has leukocytosis to 17.7.  Discussed with orthopedic surgery, they will likely obtain aspiration of the abscess but will review the CT and recommended obtaining CT with and without contrast.  This is currently pending.  Discussed with admitting hospitalist who will follow-up on remainder of labs.  Patient also had some swelling of bilateral ankles and left fourth toe, x-rays will be obtained but low suspicion for fracture at this point.  Orthopedic surgery recommended holding off on antibiotics until culture was obtained.     DIAGNOSIS:     ICD-10-CM    1. Abscess of right hip  L02.415     s/p hip replacement           Ursula Mack MD  07/19/25 150

## 2025-07-19 NOTE — CONSULTS
Care Management Initial Consult    General Information  Assessment completed with: Parents,    Type of CM/SW Visit: Initial Assessment    Primary Care Provider verified and updated as needed: Yes   Readmission within the last 30 days: no previous admission in last 30 days      Reason for Consult: discharge planning  Advance Care Planning:            Communication Assessment  Patient's communication style: spoken language (English or Bilingual)             Cognitive  Cognitive/Neuro/Behavioral:                        Living Environment:   People in home: alone     Current living Arrangements: apartment      Able to return to prior arrangements: yes       Family/Social Support:  Care provided by: self, other (see comments)  Provides care for: no one, unable/limited ability to care for self     Support system: PCA, Parent(s)          Description of Support System: Supportive, Involved    Support Assessment: Adequate family and caregiver support    Current Resources:   Patient receiving home care services: No        Community Resources: [facility] Programs, Home Care,  Mental Health (SNAP/EBT, food shelves, individualized in-home support through Developmental Disabilities Waiver (coordination of care, medications, day-to-day independent living support))  Equipment currently used at home: cane, straight, raised toilet seat, tub bench, grab bar, tub/shower, grab bar, toilet  Supplies currently used at home:      Employment/Financial:  Employment Status:          Financial Concerns:             Does the patient's insurance plan have a 3 day qualifying hospital stay waiver?  Yes     Which insurance plan 3 day waiver is available? Alternative insurance waiver    Will the waiver be used for post-acute placement? Undetermined at this time    Lifestyle & Psychosocial Needs:  Social Drivers of Health     Food Insecurity: Low Risk  (6/15/2025)    Food Insecurity     Within the past 12 months, did you worry that your food would run  out before you got money to buy more?: No     Within the past 12 months, did the food you bought just not last and you didn t have money to get more?: No   Depression: Not at risk (7/11/2025)    PHQ-2     PHQ-2 Score: 1   Recent Concern: Depression - At risk (6/5/2025)    PHQ-2     PHQ-2 Score: 4   Housing Stability: Low Risk  (6/15/2025)    Housing Stability     Do you have housing? : Yes     Are you worried about losing your housing?: No   Tobacco Use: Medium Risk (7/16/2025)    Patient History     Smoking Tobacco Use: Former     Smokeless Tobacco Use: Former     Passive Exposure: Current   Financial Resource Strain: Low Risk  (6/15/2025)    Financial Resource Strain     Within the past 12 months, have you or your family members you live with been unable to get utilities (heat, electricity) when it was really needed?: No   Alcohol Use: Not on file   Transportation Needs: Low Risk  (6/15/2025)    Transportation Needs     Within the past 12 months, has lack of transportation kept you from medical appointments, getting your medicines, non-medical meetings or appointments, work, or from getting things that you need?: No   Physical Activity: Not on file   Interpersonal Safety: Low Risk  (4/14/2025)    Interpersonal Safety     Do you feel physically and emotionally safe where you currently live?: Yes     Within the past 12 months, have you been hit, slapped, kicked or otherwise physically hurt by someone?: No     Within the past 12 months, have you been humiliated or emotionally abused in other ways by your partner or ex-partner?: No   Stress: Not on file   Social Connections: Not on file   Health Literacy: Not on file       Functional Status:  Prior to admission patient needed assistance:   Dependent ADLs:: Ambulation-walker, Dressing  Dependent IADLs:: Cleaning, Cooking, Laundry, Shopping, Meal Preparation, Medication Management, Money Management  Assesssment of Functional Status: Not at baseline with ADL  Functioning    Mental Health Status:          Chemical Dependency Status:                Values/Beliefs:  Spiritual, Cultural Beliefs, [employer] Practices, Values that affect care:                 Discussed  Partnership in Safe Discharge Planning  document with patient/family: No    Additional Information:    SW spoke with pt mom/guardian Belen to introduce role and discuss discharge planning. Belen shares that pt lives in an apartment alone and has daily help from care givers. Pt ambulates with a walker and receives assist with all IADLs. Pts caregivers assist with lower body dressing due to inability to bend over.  Pt has a care coordinator Jose Mckeon, pt is on a DD waiver. Belen denies current questions from SW and feels that pt has been managing well at home.     Next Steps: follow for team recommendations.    Natalie Heart/JOSE Reyes, AI  Inpatient Care Coordination  Emergency Room /Float  [phone]    Natalie Heart, AI

## 2025-07-19 NOTE — TELEPHONE ENCOUNTER
Telephone call  Mother calling consent to communicate on file.  The patient was Gout not able to walk on his feet both ankles swollen and puffy  Patient not able to walk because of the pain. Mom wanted him to get medication for the gout he has had gout before.  She did not want to bring him in because he would need to go by ambulance because of the pain.  Consent to communicate on file.  Paged Dr carbajal  who was on call.  Dr Carbajal  after going over the chart felt he needed to be seen in the ed to make sure it was not something else.  Returned call to mother and recommended that he be seen in the ED and to go by ambulance if he is unable to stand.    Che Smith RN   [facility] Nurse Advisor  12:17 PM 7/19/2025  .

## 2025-07-19 NOTE — PROGRESS NOTES
Pt with evidence of fluid collection on exam and CT after MARGO without evidence of fracture. We are currently coordinating care with Dr Randall with likely I&D Monday. No plans for OR tomorrow and as of now no plans for aspiration. We will see patient tomorrow and update.

## 2025-07-19 NOTE — H&P
[facility]    History and Physical - Hospitalist Service       Date of Admission:  7/19/2025  Primary Care Physician   Louise Villalobos  CONSULTANTS: ortho     Assessment & Plan      Kimo ABE Greenwood is a 44 year old male who has a history of asthma, Patel's esophagus with GERD, hypertension, depression, mild mental retardation, chronic kidney disease, gout, morbid obesity, sleep apnea, overactive bladder, iron deficiency anemia, and a right total hip arthroplasty in April 2025 coming in with an infected surgical site on that hip.  He has been dealing with increased edema, redness, and tenderness of that right hip.  Was seen at [facility] urgent care 7/11 and started on doxycycline and then later again put on Augmentin 7/16/2025.  The symptoms of gotten worse gradually.  Unfortunately cannot bear weight now.  Actually had a fall yesterday falling on his right side waking up with bilateral ankle swelling.  Patient denies any other symptoms like fever, chills, sweats.  Has no current nausea or vomiting.  He has no chest pain.  Ortho see the patient and wants to hold off on antibiotics at this point.    In the ER , the patient had a BMP showinga normal creatinine 1.15, BUN 11.2, potassium 3.9, CRP up at 130 but this is better from 161 on 7/11/25, gluconse 80, lactic acid was normal at 0.8, he does have a leukocytosis at 17.7 which is higher than 12.6 on 7/11.  His hb 10.2 with an MCV of 85 and plt of 344. Blood culture was sent.  Pending is imaging with a CT of the right hip and xrays of the ankles and pelvis. Patient getting admitted for ongoing infected surgical hip        Right infected surgical hip, history right total hip arthorplasty   Patient is presenting with a history of a right total hip arthroplasty in April 2025.  Has developed subsequent swelling, redness, and tenderness.  As an outpatient had been treated with doxycycline and recently started on Augmentin.  Orthopedics been consulted.   Patient does have significant drainage and needs the area aspirated.  CT scan has been ordered of the hip.  He has been made n.p.o. and placed on IV fluids.  Will hold off on antibiotics at this point per Ortho's recommendations until cultures can be sent.  If the joint itself is infected will need infectious disease involved because he will likely need long-term IV antibiotics.  For pain we will continue him on scheduled Tylenol, have as needed Atarax and oxycodone available and then have Dilaudid available for breakthrough pain.  Will follow his white blood cell count and CRP. Patient lives alone in an apartment which has stairs.  He likely will need a Tcu at discharge.  SW to get involved. Will also need physical therapy eventually.     Fall, ankle pain  Patient had a fall at home on 7/18/2025 and woke up with bilateral ankle swelling and pain.  X-rays are pending to rule out fracture as well as an x-ray of the pelvis.    asthma  Stable, does not seem to be on any inhalers    Patel's esophagus, GERD  Continue on a PPI    Essential hypertension    Is on prazosin    Mild mental delay/decreased cognition    History noted, has a guardian (mother and sister)    Chronic kidney disease per chart  Creatinine baseline 1.1-1.8.  on admission, creatinine is 1.15 with a normal GFR.  I do not think the diagnosis of chronic kidney disease is accurate given his normal GFR.  Likely had a depressed creatinine in the past at some point but now is normal     Gout, hyperuricacidemia   Recently treated for gout with colchicine, steroid burst.  Should be on allopurinol so will resume.  Patient had not been taking at home.     Overactive bladder  Is on myrbetrq and ditropan     Depression  Cot on home cymbalta and zyprexa    Morbidly obese, obstructive sleep apnea  Patient with a BMI of 45.  This complicates all aspects of his medical care.  He does use CPAP and this has been reordered    Iron deficiency anemia   Patient to continue  on his iron supplements       Discussed plan of care with Dr Mack in the er  Diet: NPO for Procedure/Surgery per Anesthesia Guidelines Except for: Meds; Clear liquids before procedure/surgery: ADULT (Age GREATER than or Equal to 18 years) - Clear liquids 2 hours before procedure/surgery    DVT Prophylaxis: Pneumatic Compression Devices  Germain Catheter: Not present  Lines: None     Cardiac Monitoring: None    RESTRAINTS: not indicated  Code Status: Full Code           This document was created using voice recognition technology.  Please excuse any typographical errors that may have occurred.  Please call with any questions.           Clinically Significant Risk Factors Present on Admission               # Hypoalbuminemia: Lowest albumin = 3.1 g/dL at 7/19/2025  2:31 PM, will monitor as appropriate     # Hypertension: Noted on problem list      # Anemia: based on hgb <11       # Morbid Obesity: Estimated body mass index is 44.89 kg/m  as calculated from the following:    Height as of 7/11/25: 1.829 m (6').    Weight as of 7/16/25: 150.1 kg (331 lb).       # Asthma: noted on problem list        Disposition Plan      Expected Discharge Date: 07/21/2025                  Shahab Fang MD  Hospitalist Service  [facility]  Securely message with [employer] (more info)  Text page via [employer] Paging/Directory     Medically Ready for Discharge: Anticipated in 2-4 Days      Length of stay: Anticipate greater than 2 midnight hospitalization for evaluation and treatment of infected surgical hip          ______________________________________________________________________    Chief Complaint   Right hip infection    History is obtained from the patient  Epic reviewed    History of Present Illness   Kimo Greenwood is a 44 year old male who has a history of asthma, Patel's esophagus with GERD, hypertension, depression, mild mental retardation, chronic kidney disease, gout, morbid obesity, sleep apnea,  overactive bladder, iron deficiency anemia, and a right total hip arthroplasty in April 2025 coming in with an infected surgical site on that hip.  He has been dealing with increased edema, redness, and tenderness of that right hip.  Was seen at [facility] urgent care 7/11 and started on doxycycline and then later again put on Augmentin 7/16/2025.  The symptoms of gotten worse gradually.  Unfortunately cannot bear weight now.  Actually had a fall yesterday falling on his right side waking up with bilateral ankle swelling.  Patient denies any other symptoms like fever, chills, sweats.  Has no current nausea or vomiting.  He has no chest pain.  Ortho see the patient and wants to hold off on antibiotics at this point.    In the ER , the patient had a BMP showinga normal creatinine 1.15, BUN 11.2, potassium 3.9, CRP up at 130 but this is better from 161 on 7/11/25, gluconse 80, lactic acid was normal at 0.8, he does have a leukocytosis at 17.7 which is higher than 12.6 on 7/11.  His hb 10.2 with an MCV of 85 and plt of 344. Blood culture was sent.  Pending is imaging with a CT of the right hip and xrays of the ankles and pelvis. Patient getting admitted for ongoing infected surgical hip    Past Medical History    Past Medical History:   Diagnosis Date    Arthritis     DDD hips and knees    Asthma     Asthma     Patel's esophagus     Chronic pain     Chronic pain - left hip/leg pain     degenerative disc disease, back, hips, knees    Gastroesophageal reflux disease     History of anesthesia complications     agitation x1 after interstim 2013    Hypertension     Hypertension     Leukocytosis     LPRD (laryngopharyngeal reflux disease)     Marijuana abuse     Marijuana abuse     MDD (major depressive disorder)     MDD (major depressive disorder)     Mental retardation, mild (I.Q. 50-70)     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    With associated speech/language delay    Obesity 11/11/2010    LOREN (obstructive sleep  apnea)     Uses a CPAP    LOREN (obstructive sleep apnea)     Seborrheic dermatitis     Seborrheic dermatitis     Sleep apnea     CPAP       Past Surgical History   Past Surgical History:   Procedure Laterality Date    ARTHROPLASTY HIP Left 01/25/2017    Procedure: ARTHROPLASTY HIP;  Surgeon: Bala Randall MD;  Location: [facility] OR    ARTHROPLASTY HIP Left     ARTHROPLASTY HIP Right 04/14/2025    Procedure: Right total hip arthroplasty;  Surgeon: Bala Randall MD;  Location: [facility] OR    ARTHROPLASTY REVISION HIP Left 06/24/2019    Procedure: Revision left total hip arthroplasty with a head and liner exchange to a Biomet dual mobility head and liner.;  Surgeon: Bala Randall MD;  Location: [facility] OR    BACK SURGERY      BLADDER SURGERY      Ibarra, [facility],Interstem stimulator implant    CLOSED REDUCTION HIP Left 06/10/2019    Procedure: Closed reduction under general anesthesia left recurrent prosthetic hip dislocation;  Surgeon: Rafat Cazares MD;  Location:  OR    COLONOSCOPY N/A 10/30/2015    Procedure: COMBINED COLONOSCOPY, SINGLE OR MULTIPLE BIOPSY/POLYPECTOMY BY BIOPSY;  Surgeon: Alexis Jackson MD;  Location:  GI    COLONOSCOPY N/A 11/17/2016    Procedure: COMBINED COLONOSCOPY, SINGLE OR MULTIPLE BIOPSY/POLYPECTOMY BY BIOPSY;  Surgeon: Cat Huber MD;  Location:  GI    COLONOSCOPY N/A 10/28/2020    Procedure: COLONOSCOPY;  Surgeon: You Kraus MD;  Location:  OR    COLONOSCOPY      ESOPHAGOSCOPY, GASTROSCOPY, DUODENOSCOPY (EGD), COMBINED N/A 11/17/2016    Procedure: COMBINED ESOPHAGOSCOPY, GASTROSCOPY, DUODENOSCOPY (EGD), BIOPSY SINGLE OR MULTIPLE;  Surgeon: Cat Huber MD;  [facility]    ESOPHAGOSCOPY, GASTROSCOPY, DUODENOSCOPY (EGD), COMBINED N/A 03/12/2020    Procedure: ESOPHAGOGASTRODUODENOSCOPY WITH BIOPSIES;  Surgeon: You Kraus MD;  Location:  OR    ESOPHAGOSCOPY, GASTROSCOPY, DUODENOSCOPY (EGD),  COMBINED N/A 10/28/2020    Procedure: ESOPHAGOGASTRODUODENOSCOPY, WITH BIOPSY;  Surgeon: You Kraus MD;  Location: [facility] OR    ESOPHAGOSCOPY, GASTROSCOPY, DUODENOSCOPY (EGD), COMBINED      ESOPHAGOSCOPY, GASTROSCOPY, DUODENOSCOPY (EGD), COMBINED N/A 11/29/2023    Procedure: Esophagoscopy, gastroscopy, duodenoscopy (EGD), combined with biopsies using forceps;  Surgeon: You Kraus MD;  Location: [facility] GI    EXAM UNDER ANESTHESIA, FULGURATE CONDYLOMA ANUS, COMBINED N/A 12/22/2016    Procedure: COMBINED EXAM UNDER ANESTHESIA, FULGURATE CONDYLOMA ANUS;  Surgeon: Nya Cabello MD;  [facility]: [facility] OR    GENITOURINARY SURGERY      JOINT REPLACEMENT Left 01/01/2017    MARGO, Revision Left MARGO    OTHER SURGICAL HISTORY      interstim stimulator implant    SD CYSTOURETHROSCOPY INJ CHEMODENERVATION BLADDER N/A 01/16/2019    Procedure: CYSTOSCOPY BOTOX BLADDER INJECTIONS (100 UNITS IN 10CC);  Surgeon: Didier Ibarra MD;  Location: [facility];  Service: Urology    SD IMPLANT PERIPH/GASTRIC NEUROSTIM/ N/A 01/08/2020    Procedure: NEW INTERSTIM LEAD, REPLACE OLD; NEW INTERSTIM BATTERY, REPLACE OLD;  Surgeon: Didier Ibarra MD;  Location: [facility];  Service: Urology       Prior to Admission Medications pharm D to reconcile  Prior to Admission Medications   Prescriptions Last Dose Informant Patient Reported? Taking?   DULoxetine (CYMBALTA) 60 MG capsule   Yes No   Sig: Take 120 mg by mouth daily    OLANZapine (ZYPREXA) 15 MG tablet   Yes No   Sig: Take 15 mg by mouth at bedtime.   allopurinol (ZYLOPRIM) 300 MG tablet   No No   Sig: Take 1 tablet (300 mg) by mouth daily.   Patient not taking: Reported on 7/16/2025   amoxicillin-clavulanate (AUGMENTIN) 875-125 MG tablet   No No   Sig: Take 1 tablet by mouth 2 times daily for 10 days.   calcium polycarbophil (FIBER-LAX) 625 MG tablet   No No   Sig: Take 1 tablet (625 mg) by mouth daily.   ferrous sulfate (FEROSUL) 325 (65 Fe) MG tablet   No No    Sig: TAKE 1 TABLET BY MOUTH DAILY WITH BREAKFAST   mirabegron (MYRBETRIQ) 50 MG 24 hr tablet   Yes No   Sig: Take 1 tablet by mouth daily   oxyBUTYnin ER (DITROPAN XL) 15 MG 24 hr tablet   No No   Sig: Take 2 tablets (30 mg) by mouth daily   prazosin (MINIPRESS) 1 MG capsule   Yes No   Sig: Take 3 mg by mouth At Bedtime   predniSONE (DELTASONE) 20 MG tablet   No No   Sig: Take 3 tabs by mouth daily x 3 days, then 2 tabs daily x 3days, then 1 tab daily x 3 days, then 1/2 tab daily x 3 days.   Patient not taking: Reported on 7/16/2025   tirzepatide-Weight Management (ZEPBOUND) 12.5 MG/0.5ML prefilled pen   No No   Sig: Inject 0.5 mLs (12.5 mg) subcutaneously every 7 days.      Facility-Administered Medications: None        Allergies   Allergies   Allergen Reactions    Other [No Clinical Screening - See Comments] Other (See Comments)     Awoke from anesthesia with anger and ripping off dressing, after interstim placement, outside hospital 2013        REVIEW OF SYSTEMS:  A comprehensive review of systems was negative except for items noted in the HPI.  Patient currently denies any fever, chills, sweats, nausea, vomiting, diarrhea, shortness of breath, or chest pain.         Physical Exam   Vital Signs: Temp: 96.9  F (36.1  C) Temp src: Temporal BP: 139/74 Pulse: 81   Resp: 16 SpO2: 95 %      Weight: 0 lbs 0 oz    General appearance: Patient is alert and oriented x3 but is a little slow, no apparent distress, pleasant and conversing normally, speaking in full sentences, appears stated age, sitting in a cot in the er  HEENT:  Mucous membranes are moist  NECK:  thick  RESPIRATORY: Clear to auscultation bilateral, good air movement  CARDIOVASCULAR: Regular rate and rhythm, normal S1/S2, no murmurs   GASTROINTESTINAL: obese, Non-distended, non-tender, soft, bowel sounds present throughout   NEUROLOGIC:  Cranial nerves II-XII intact, without any focal deficits, strength 5/5 throughout  EXTREMITIES:  Moves all extremities,  no clubbing, cyanosis has 2+ bilateral ankle edema, right hip with significant errythema, yellow/green drainage, swelling, ans warmth  :  Marcellus not present         Data     I have personally reviewed the following data over the past 24 hrs:    17.7 (H)  \   10.2 (L)   / 344     138 103 11.2 /  80   3.9 26 1.15 \     ALT: 21 AST: 15 AP: 67 TBILI: 0.3   ALB: 3.1 (L) TOT PROTEIN: 6.8 LIPASE: N/A     Procal: N/A CRP: 130.08 (H) Lactic Acid: 0.8         Imaging:   Results for orders placed or performed during the hospital encounter of 07/05/25   XR Pelvis and Hip Right 1 View    Narrative    EXAM: XR PELVIS AND HIP RIGHT 1 VIEW  LOCATION: [facility]  DATE: 7/5/2025    INDICATION: Fall, right hip pain.  COMPARISON: 04/14/2025      Impression    IMPRESSION: Bilateral total hip replacements. Dedicated views of the right hip show no evidence of an acute displaced fracture or dislocation. Right sacral stimulator device. Arthritic changes pubic symphysis.   CT Hip Right w/o Contrast    Narrative    EXAM: CT HIP RIGHT W/O CONTRAST  LOCATION: [facility]  DATE: 7/5/2025    INDICATION: Fall, right hip pain, recent replacement, negative XR.  COMPARISON: Same-day radiograph.  TECHNIQUE: Noncontrast. Axial, sagittal and coronal thin-section reconstruction. Dose reduction techniques were used.     FINDINGS:     BONES:  -Bones are demineralized and there are changes of a left total hip replacement. There is no evidence of an acute displaced periprosthetic fracture about the acetabulum or proximal femur.    SOFT TISSUES:  -There is a partially visualized lateral soft tissue fluid collection measuring at least 8.1 x 7.9 x 14.6 cm (RL, AP, SI) along the lateral soft tissues of the hip and proximal thigh. The fluid collection extends from the subcutaneous tissues deeper   towards the hip, abutting the greater trochanteric cortex. This fluid collection may communicate with the joint space as  there is increased soft tissue versus fluid at the hip joint, which may be contiguous.    -Partially visualized generator and right sacral stimulator device. Scattered right inguinal and right pelvic sidewall lymph nodes, presumably reactive.      Impression    IMPRESSION:  1.  Postop changes right total hip replacement. No evidence of an acute displaced periprosthetic fracture.  2.  Large partially visualized lateral soft tissue fluid collection measuring at least 14.6 cm in length, abutting the greater trochanteric cortex. This fluid collection may communicate with the hip joint as there is increased soft tissue versus fluid   about the hip joint, which may be contiguous. This is nonspecific and can be seen with postoperative seroma, hematoma or abscess  3.  Bone demineralization.         *Note: Due to a large number of results and/or encounters for the requested time period, some results have not been displayed. A complete set of results can be found in Results Review.     Procedures: Ortho to aspirate hip  I have personally have reviewed the patient's most up to date radiologic exams,  labs, orders, and medications myself

## 2025-07-19 NOTE — ED NOTES
"[facility]  ED Nurse Handoff Report    ED Chief complaint: Hip Pain and Foot Pain  . ED Diagnosis:   Final diagnoses:   Abscess of right hip - s/p hip replacement       Allergies:   Allergies   Allergen Reactions    Other [No Clinical Screening - See Comments] Other (See Comments)     Awoke from anesthesia with anger and ripping off dressing, after interstim placement, outside hospital 2013       Code Status: Full Code    Activity level - Baseline/Home:  walker or cane.  Activity Level - Current:   assist of 1 and walker.   Lift room needed: No.   Bariatric: Yes   Needed: No   Isolation: No.   Infection: Not Applicable.     Respiratory status: Room air    Vital Signs (within 30 minutes):   Vitals:    07/19/25 1430 07/19/25 1445 07/19/25 1635 07/19/25 1640   BP:  139/74 137/71    Pulse:  81 81    Resp:    20   Temp:       TempSrc:       SpO2: 100% 95%  100%       Cardiac Rhythm:  ,      Pain level:    Patient confused: No.   Patient Falls Risk: nonskid shoes/slippers when out of bed, arm band in place, patient and family education, assistive device/personal items within reach, activity supervised, and room door open.   Elimination Status: Not yet in ED     Patient Report - Initial Complaint: Hip Pain, Foot Pain.   Focused Assessment: MusculoskeletalMusculoskeletal WDL: .WDL except; joint(s); mobility (Pt had RTKA in April, says he it has been swollen since. Redness and swelling noted to right hip with yellow-colored clear drainage noted. Says he has been talking with hip surgeon who says it is infected.)General Mobility: mildly impaired (uses cane or walker at baseline)Left Joint Tenderness: ankle; foot; tenderness (Pt reports L and R ankle swelling and pain that started today. Says he \"slipped on the wet bathroom floor\" yesterday and thinks this may have increased pain. Endorses pain in ankle and big toe bilaterally, redness noted.)Right Joint Tenderness: ankle; foot; tenderness; " hipLeft Joint Swelling: ankle; foot; swellingRight Joint Swelling: swelling; ankle; foot; hipAdditional Documentation: RLE Neurovascular Assessment (Group); LLE NeurovaLLE Neurovascular AssessmentTemperature LLE: warm (Pedal pulses 2+ able to dorsiflex and plantarflex)Color LLE: red; paleSensation LLE: tenderness present; no numbness; no tingling  RLE Neurovascular AssessmentTemperature RLE: warm (Pedal pulses 2+ able to dorsiflex       Abnormal Results:   Labs Ordered and Resulted from Time of ED Arrival to Time of ED Departure   COMPREHENSIVE METABOLIC PANEL - Abnormal       Result Value    Sodium 138      Potassium 3.9      Carbon Dioxide (CO2) 26      Anion Gap 9      Urea Nitrogen 11.2      Creatinine 1.15      GFR Estimate 81      Calcium 9.0      Chloride 103      Glucose 80      Alkaline Phosphatase 67      AST 15      ALT 21      Protein Total 6.8      Albumin 3.1 (*)     Bilirubin Total 0.3     CRP INFLAMMATION - Abnormal    CRP Inflammation 130.08 (*)    ERYTHROCYTE SEDIMENTATION RATE AUTO - Abnormal    Erythrocyte Sedimentation Rate 76 (*)    CBC WITH PLATELETS AND DIFFERENTIAL - Abnormal    WBC Count 17.7 (*)     RBC Count 3.66 (*)     Hemoglobin 10.2 (*)     Hematocrit 31.0 (*)     MCV 85      MCH 27.9      MCHC 32.9      RDW 12.8      Platelet Count 344      % Neutrophils 81      % Lymphocytes 11      % Monocytes 6      % Eosinophils 1      % Basophils 0      % Immature Granulocytes 1      NRBCs per 100 WBC 0      Absolute Neutrophils 14.4 (*)     Absolute Lymphocytes 1.9      Absolute Monocytes 1.0      Absolute Eosinophils 0.2      Absolute Basophils 0.1      Absolute Immature Granulocytes 0.1      Absolute NRBCs 0.0     ISTAT GASES LACTATE VENOUS POCT - Abnormal    Lactic Acid POCT 0.8      Bicarbonate Venous POCT 28      O2 Sat, Venous POCT 52 (*)     pCO2 Venous POCT 44      pH Venous POCT 7.41      pO2 Venous POCT 28     CRYSTAL ID SYNOVIAL FLUID   BLOOD CULTURE   BLOOD CULTURE   GRAM STAIN    ANAEROBIC BACTERIAL CULTURE ROUTINE   AEROBIC BACTERIAL CULTURE ROUTINE   CELL COUNT WITH DIFFERENTIAL FLUID        XR Pelvis and Hip Right 2 Views   Final Result   IMPRESSION: Right total hip arthroplasty. No periprosthetic fracture or concerning lucency. Small amount of cystic change in the acetabulum present on preoperative CT. Left total hip arthroplasty is noted. 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 Right sacral nerve stimulator. Contrast opacifies the collecting system. Mottled gas present lateral to the hip corresponding to fluid collection identified on CT.       Foot XR, G/E 3 views, left   Final Result   IMPRESSION:   Left foot and ankle: Hallux valgus with mild 1st MTP joint arthrosis. Small well-corticated bone fragment along the lateral aspect of the 1st MTP joint space is chronic in appearance. There is no evidence of an acute displaced left foot or ankle    fracture. Hammertoe deformities. Ankle mortise is intact.      Right ankle: Right ankle shows circumferential soft tissue swelling. Ankle mortise is intact. There is no evidence of an acute displaced fracture. Small calcaneal heel spur.      XR Ankle Bilateral G/E 3 Views   Final Result   IMPRESSION:   Left foot and ankle: Hallux valgus with mild 1st MTP joint arthrosis. Small well-corticated bone fragment along the lateral aspect of the 1st MTP joint space is chronic in appearance. There is no evidence of an acute displaced left foot or ankle    fracture. Hammertoe deformities. Ankle mortise is intact.      Right ankle: Right ankle shows circumferential soft tissue swelling. Ankle mortise is intact. There is no evidence of an acute displaced fracture. Small calcaneal heel spur.      CT Hip Right w Contrast   Final Result   IMPRESSION:   1.  Right total hip arthroplasty. Interval increase in size of large right hip fluid and gas collection extending from the lateral skin surface through the subcutaneous tissues  and lateral hip fascia likely contiguous with the joint space. Aspiration is    recommended.            XR Joint Aspiration Major Right    (Results Pending)       Treatments provided: Imaging, meds, labs, IV fluids  Family Comments: N/A  OBS brochure/video discussed/provided to patient:  N/A  ED Medications:   Medications   sodium chloride (PF) 0.9% PF flush 3 mL (has no administration in time range)   sodium chloride (PF) 0.9% PF flush 3 mL (3 mLs Intracatheter $Given 7/19/25 7673)   allopurinol (ZYLOPRIM) tablet 300 mg (has no administration in time range)   DULoxetine (CYMBALTA) DR capsule 120 mg (has no administration in time range)   ferrous sulfate (FEROSUL) tablet 325 mg (has no administration in time range)   mirabegron (MYRBETRIQ) 24 hr tablet 50 mg (has no administration in time range)   OLANZapine (zyPREXA) tablet 15 mg (has no administration in time range)   oxyBUTYnin ER (DITROPAN XL) 24 hr tablet 30 mg (has no administration in time range)   prazosin (MINIPRESS) capsule 3 mg (has no administration in time range)   lidocaine 1 % 0.1-1 mL (has no administration in time range)   lidocaine (LMX4) cream (has no administration in time range)   sodium chloride (PF) 0.9% PF flush 3 mL (3 mLs Intracatheter Not Given 7/19/25 1648)   sodium chloride (PF) 0.9% PF flush 3 mL (has no administration in time range)   senna-docusate (SENOKOT-S/PERICOLACE) 8.6-50 MG per tablet 1 tablet (has no administration in time range)     Or   senna-docusate (SENOKOT-S/PERICOLACE) 8.6-50 MG per tablet 2 tablet (has no administration in time range)   calcium carbonate (TUMS) chewable tablet 1,000 mg (has no administration in time range)   lactated ringers infusion (has no administration in time range)   acetaminophen (TYLENOL) tablet 975 mg (has no administration in time range)   hydrOXYzine HCl (ATARAX) tablet 25 mg (has no administration in time range)     Or   hydrOXYzine HCl (ATARAX) tablet 50 mg (has no administration in time  range)   oxyCODONE IR (ROXICODONE) half-tab 2.5 mg (has no administration in time range)   oxyCODONE (ROXICODONE) tablet 5 mg (has no administration in time range)   HYDROmorphone (DILAUDID) injection 0.2 mg (has no administration in time range)   HYDROmorphone (DILAUDID) injection 0.4 mg (has no administration in time range)   polyethylene glycol (MIRALAX) Packet 17 g (has no administration in time range)   pantoprazole (PROTONIX) EC tablet 40 mg (has no administration in time range)   oxyCODONE (ROXICODONE) tablet 5 mg (5 mg Oral $Given 7/19/25 1452)   sodium chloride 0.9% BOLUS 1,000 mL (1,000 mLs Intravenous $New Bag 7/19/25 1451)   iopamidol (ISOVUE-370) solution 100 mL (100 mLs Intravenous $Given 7/19/25 1511)   sodium chloride 0.9 % bag for CT scan flush (65 mLs Intravenous $Given 7/19/25 1511)       Drips infusing:  NO  For the majority of the shift this patient was Green.   Interventions performed were N/A.    Sepsis treatment initiated: No    Cares/treatment/interventions/medications to be completed following ED care: Aspiration of abscess    ED Nurse Name: Joselin Darnell RN  4:50 PM    RECEIVING UNIT ED HANDOFF REVIEW    Above ED Nurse Handoff Report was reviewed: Yes  Reviewed by: Trena Reaves RN on July 19, 2025 at 6:29 PM   MARIBEL Raymundo called the ED to inform them the note was read: No

## 2025-07-20 LAB
ABO + RH BLD: NORMAL
ANION GAP SERPL CALCULATED.3IONS-SCNC: 10 MMOL/L (ref 7–15)
APTT PPP: 42 SECONDS (ref 22–38)
BLD GP AB SCN SERPL QL: NEGATIVE
BUN SERPL-MCNC: 12.2 MG/DL (ref 6–20)
CALCIUM SERPL-MCNC: 9.1 MG/DL (ref 8.8–10.4)
CHLORIDE SERPL-SCNC: 104 MMOL/L (ref 98–107)
CREAT SERPL-MCNC: 1.06 MG/DL (ref 0.67–1.17)
CRP SERPL-MCNC: 140.27 MG/L
EGFRCR SERPLBLD CKD-EPI 2021: 89 ML/MIN/1.73M2
ERYTHROCYTE [DISTWIDTH] IN BLOOD BY AUTOMATED COUNT: 12.9 % (ref 10–15)
GLUCOSE SERPL-MCNC: 119 MG/DL (ref 70–99)
HCO3 SERPL-SCNC: 24 MMOL/L (ref 22–29)
HCT VFR BLD AUTO: 29.4 % (ref 40–53)
HGB BLD-MCNC: 9.7 G/DL (ref 13.3–17.7)
INR PPP: 1.09 (ref 0.85–1.15)
MCH RBC QN AUTO: 27.8 PG (ref 26.5–33)
MCHC RBC AUTO-ENTMCNC: 33 G/DL (ref 31.5–36.5)
MCV RBC AUTO: 84 FL (ref 78–100)
PLATELET # BLD AUTO: 318 10E3/UL (ref 150–450)
POTASSIUM SERPL-SCNC: 3.9 MMOL/L (ref 3.4–5.3)
PROTHROMBIN TIME: 14.2 SECONDS (ref 11.8–14.8)
RBC # BLD AUTO: 3.49 10E6/UL (ref 4.4–5.9)
SODIUM SERPL-SCNC: 138 MMOL/L (ref 135–145)
SPECIMEN EXP DATE BLD: NORMAL
WBC # BLD AUTO: 15.1 10E3/UL (ref 4–11)

## 2025-07-20 PROCEDURE — 999N000157 HC STATISTIC RCP TIME EA 10 MIN

## 2025-07-20 PROCEDURE — 86850 RBC ANTIBODY SCREEN: CPT | Performed by: STUDENT IN AN ORGANIZED HEALTH CARE EDUCATION/TRAINING PROGRAM

## 2025-07-20 PROCEDURE — 80048 BASIC METABOLIC PNL TOTAL CA: CPT | Performed by: INTERNAL MEDICINE

## 2025-07-20 PROCEDURE — 120N000001 HC R&B MED SURG/OB

## 2025-07-20 PROCEDURE — 250N000011 HC RX IP 250 OP 636: Performed by: INTERNAL MEDICINE

## 2025-07-20 PROCEDURE — 86140 C-REACTIVE PROTEIN: CPT | Performed by: INTERNAL MEDICINE

## 2025-07-20 PROCEDURE — 99232 SBSQ HOSP IP/OBS MODERATE 35: CPT | Performed by: INTERNAL MEDICINE

## 2025-07-20 PROCEDURE — 36415 COLL VENOUS BLD VENIPUNCTURE: CPT | Performed by: STUDENT IN AN ORGANIZED HEALTH CARE EDUCATION/TRAINING PROGRAM

## 2025-07-20 PROCEDURE — 85018 HEMOGLOBIN: CPT | Performed by: INTERNAL MEDICINE

## 2025-07-20 PROCEDURE — 36415 COLL VENOUS BLD VENIPUNCTURE: CPT | Performed by: INTERNAL MEDICINE

## 2025-07-20 PROCEDURE — 85730 THROMBOPLASTIN TIME PARTIAL: CPT | Performed by: STUDENT IN AN ORGANIZED HEALTH CARE EDUCATION/TRAINING PROGRAM

## 2025-07-20 PROCEDURE — 94660 CPAP INITIATION&MGMT: CPT

## 2025-07-20 PROCEDURE — 250N000013 HC RX MED GY IP 250 OP 250 PS 637: Performed by: INTERNAL MEDICINE

## 2025-07-20 PROCEDURE — 85610 PROTHROMBIN TIME: CPT | Performed by: STUDENT IN AN ORGANIZED HEALTH CARE EDUCATION/TRAINING PROGRAM

## 2025-07-20 PROCEDURE — 86923 COMPATIBILITY TEST ELECTRIC: CPT | Performed by: ANESTHESIOLOGY

## 2025-07-20 RX ORDER — OXYCODONE HYDROCHLORIDE 5 MG/1
5 TABLET ORAL EVERY 4 HOURS PRN
Refills: 0 | Status: DISCONTINUED | OUTPATIENT
Start: 2025-07-20 | End: 2025-07-20

## 2025-07-20 RX ORDER — KETOROLAC TROMETHAMINE 30 MG/ML
30 INJECTION, SOLUTION INTRAMUSCULAR; INTRAVENOUS EVERY 6 HOURS PRN
Status: DISPENSED | OUTPATIENT
Start: 2025-07-20 | End: 2025-07-25

## 2025-07-20 RX ORDER — OXYCODONE HYDROCHLORIDE 10 MG/1
10 TABLET ORAL EVERY 4 HOURS PRN
Refills: 0 | Status: DISCONTINUED | OUTPATIENT
Start: 2025-07-20 | End: 2025-07-20

## 2025-07-20 RX ORDER — OXYCODONE HYDROCHLORIDE 10 MG/1
10 TABLET ORAL EVERY 4 HOURS PRN
Status: DISCONTINUED | OUTPATIENT
Start: 2025-07-20 | End: 2025-07-21 | Stop reason: ALTCHOICE

## 2025-07-20 RX ADMIN — OXYCODONE HYDROCHLORIDE 10 MG: 10 TABLET ORAL at 16:25

## 2025-07-20 RX ADMIN — HYDROMORPHONE HYDROCHLORIDE 0.4 MG: 0.2 INJECTION, SOLUTION INTRAMUSCULAR; INTRAVENOUS; SUBCUTANEOUS at 14:00

## 2025-07-20 RX ADMIN — ACETAMINOPHEN 975 MG: 325 TABLET ORAL at 12:24

## 2025-07-20 RX ADMIN — ACETAMINOPHEN 975 MG: 325 TABLET ORAL at 08:23

## 2025-07-20 RX ADMIN — HYDROMORPHONE HYDROCHLORIDE 0.2 MG: 0.2 INJECTION, SOLUTION INTRAMUSCULAR; INTRAVENOUS; SUBCUTANEOUS at 08:33

## 2025-07-20 RX ADMIN — OLANZAPINE 15 MG: 10 TABLET, FILM COATED ORAL at 21:23

## 2025-07-20 RX ADMIN — OXYCODONE HYDROCHLORIDE 15 MG: 10 TABLET ORAL at 21:23

## 2025-07-20 RX ADMIN — PRAZOSIN HYDROCHLORIDE 3 MG: 1 CAPSULE ORAL at 21:23

## 2025-07-20 RX ADMIN — OXYBUTYNIN CHLORIDE 30 MG: 5 TABLET, EXTENDED RELEASE ORAL at 08:23

## 2025-07-20 RX ADMIN — OXYCODONE HYDROCHLORIDE 5 MG: 5 TABLET ORAL at 04:00

## 2025-07-20 RX ADMIN — HYDROXYZINE HYDROCHLORIDE 25 MG: 25 TABLET, FILM COATED ORAL at 04:00

## 2025-07-20 RX ADMIN — DULOXETINE 120 MG: 60 CAPSULE, DELAYED RELEASE ORAL at 08:23

## 2025-07-20 RX ADMIN — OXYCODONE HYDROCHLORIDE 10 MG: 10 TABLET ORAL at 10:08

## 2025-07-20 RX ADMIN — HYDROXYZINE HYDROCHLORIDE 50 MG: 50 TABLET, FILM COATED ORAL at 18:24

## 2025-07-20 RX ADMIN — HYDROXYZINE HYDROCHLORIDE 25 MG: 25 TABLET, FILM COATED ORAL at 12:23

## 2025-07-20 RX ADMIN — KETOROLAC TROMETHAMINE 30 MG: 30 INJECTION, SOLUTION INTRAMUSCULAR at 17:38

## 2025-07-20 RX ADMIN — ACETAMINOPHEN 975 MG: 325 TABLET ORAL at 19:00

## 2025-07-20 RX ADMIN — MIRABEGRON 50 MG: 50 TABLET, EXTENDED RELEASE ORAL at 08:30

## 2025-07-20 RX ADMIN — PANTOPRAZOLE SODIUM 40 MG: 40 TABLET, DELAYED RELEASE ORAL at 08:24

## 2025-07-20 RX ADMIN — FERROUS SULFATE TAB 325 MG (65 MG ELEMENTAL FE) 325 MG: 325 (65 FE) TAB at 08:24

## 2025-07-20 RX ADMIN — ALLOPURINOL 300 MG: 300 TABLET ORAL at 08:24

## 2025-07-20 ASSESSMENT — ACTIVITIES OF DAILY LIVING (ADL)
ADLS_ACUITY_SCORE: 38
ADLS_ACUITY_SCORE: 41
ADLS_ACUITY_SCORE: 38
ADLS_ACUITY_SCORE: 41
ADLS_ACUITY_SCORE: 38
ADLS_ACUITY_SCORE: 41
ADLS_ACUITY_SCORE: 38

## 2025-07-20 NOTE — PLAN OF CARE
"  Problem: Adult Inpatient Plan of Care  Goal: Plan of Care Review  Description: The Plan of Care Review/Shift note should be completed every shift.  The Outcome Evaluation is a brief statement about your assessment that the patient is improving, declining, or no change.  This information will be displayed automatically on your shift  note.  Outcome: Not Progressing  Flowsheets (Taken 7/20/2025 0523)  Outcome Evaluation: pt admitted last evening for right hip abscess, bilateral ankle pain. oxycodone and atarax for pain, voiding per urinal. plan for surgery on Monday/Tuesday.  Plan of Care Reviewed With: patient  Overall Patient Progress: no change  Goal: Patient-Specific Goal (Individualized)  Description: You can add care plan individualizations to a care plan. Examples of Individualization might be:  \"Parent requests to be called daily at 9am for status\", \"I have a hard time hearing out of my right ear\", or \"Do not touch me to wake me up as it startles  me\".  Outcome: Not Progressing  Goal: Absence of Hospital-Acquired Illness or Injury  Outcome: Not Progressing  Intervention: Identify and Manage Fall Risk  Recent Flowsheet Documentation  Taken 7/20/2025 0006 by Love Rangel, RN  Safety Promotion/Fall Prevention:   activity supervised   clutter free environment maintained   lighting adjusted   nonskid shoes/slippers when out of bed   safety round/check completed  Intervention: Prevent Skin Injury  Recent Flowsheet Documentation  Taken 7/20/2025 0006 by Love Rangel, RN  Body Position: position changed independently  Skin Protection: adhesive use limited  Goal: Optimal Comfort and Wellbeing  Outcome: Not Progressing  Intervention: Monitor Pain and Promote Comfort  Recent Flowsheet Documentation  Taken 7/20/2025 0400 by Love Rangel RN  Pain Management Interventions: medication (see MAR)  Goal: Readiness for Transition of Care  Outcome: Not Progressing     Problem: Infection  Goal: Absence of " Infection Signs and Symptoms  Outcome: Not Progressing     Problem: Pain Acute  Goal: Optimal Pain Control and Function  Outcome: Not Progressing  Intervention: Develop Pain Management Plan  Recent Flowsheet Documentation  Taken 7/20/2025 0400 by Love Rangel RN  Pain Management Interventions: medication (see MAR)  Intervention: Prevent or Manage Pain  Recent Flowsheet Documentation  Taken 7/20/2025 0006 by Love Rangel RN  Sleep/Rest Enhancement:   awakenings minimized   comfort measures  Bowel Elimination Promotion: adequate fluid intake promoted  Medication Review/Management: medications reviewed   Goal Outcome Evaluation:      Plan of Care Reviewed With: patient    Overall Patient Progress: no changeOverall Patient Progress: no change    Outcome Evaluation: pt admitted last evening for right hip abscess, bilateral ankle pain. oxycodone and atarax for pain, voiding per urinal. plan for surgery on Monday/Tuesday.

## 2025-07-20 NOTE — PLAN OF CARE
"Patient vital signs are at baseline: Yes  Patient able to ambulate as they were prior to admission or with assist devices provided by therapies during their stay:  No,  Reason:  declined to get oob during shift   Patient MUST void prior to discharge:  Yes via urinal   Patient able to tolerate oral intake:  No,  Reason:  declined to eat breakfast and lunch. \"Just not hungry\"  Pain has adequate pain control using Oral analgesics:  No,  Reason:  requires IV dilaudid, oxy, Atarax and scheduled Tylenol.  Does patient have an identified :  Yes  Has goal D/C date and time been discussed with patient:  No,  Reason: scheduled surgery tomorrow at 11 am. NPO at midnight.   Problem: Adult Inpatient Plan of Care  Goal: Plan of Care Review  Description: The Plan of Care Review/Shift note should be completed every shift.  The Outcome Evaluation is a brief statement about your assessment that the patient is improving, declining, or no change.  This information will be displayed automatically on your shift  note.  Outcome: Not Progressing  Flowsheets (Taken 7/20/2025 5755)  Plan of Care Reviewed With:   patient   parent  Overall Patient Progress: no change  Goal: Patient-Specific Goal (Individualized)  Description: You can add care plan individualizations to a care plan. Examples of Individualization might be:  \"Parent requests to be called daily at 9am for status\", \"I have a hard time hearing out of my right ear\", or \"Do not touch me to wake me up as it startles  me\".  Outcome: Not Progressing  Goal: Absence of Hospital-Acquired Illness or Injury  Outcome: Not Progressing  Intervention: Identify and Manage Fall Risk  Recent Flowsheet Documentation  Taken 7/20/2025 0820 by Arelen Arimjo RN  Safety Promotion/Fall Prevention:   activity supervised   clutter free environment maintained   lighting adjusted   nonskid shoes/slippers when out of bed   safety round/check completed  Intervention: Prevent Skin Injury  Recent Flowsheet " Documentation  Taken 7/20/2025 0820 by Arleen Armijo RN  Body Position: position changed independently  Intervention: Prevent Infection  Recent Flowsheet Documentation  Taken 7/20/2025 0820 by Arleen Armijo RN  Infection Prevention:   cohorting utilized   single patient room provided   hand hygiene promoted  Goal: Optimal Comfort and Wellbeing  Outcome: Not Progressing  Intervention: Monitor Pain and Promote Comfort  Recent Flowsheet Documentation  Taken 7/20/2025 0820 by Arleen Armijo RN  Pain Management Interventions: medication (see MAR)  Goal: Readiness for Transition of Care  Outcome: Not Progressing     Problem: Infection  Goal: Absence of Infection Signs and Symptoms  Outcome: Not Progressing     Problem: Pain Acute  Goal: Optimal Pain Control and Function  Outcome: Not Progressing  Intervention: Develop Pain Management Plan  Recent Flowsheet Documentation  Taken 7/20/2025 0820 by Arleen Armijo RN  Pain Management Interventions: medication (see MAR)  Intervention: Prevent or Manage Pain  Recent Flowsheet Documentation  Taken 7/20/2025 0820 by Arlene Armijo RN  Medication Review/Management: medications reviewed   Goal Outcome Evaluation:      Plan of Care Reviewed With: patient, parent    Overall Patient Progress: no changeOverall Patient Progress: no change

## 2025-07-20 NOTE — PROGRESS NOTES
[facility]    Medicine Progress Note - Hospitalist Service    Date of Admission:  7/19/2025    Assessment & Plan      Kimo Greenwood is a 44 year old male who has a history of asthma, Patel's esophagus with GERD, hypertension, depression, mild mental retardation, chronic kidney disease, gout, morbid obesity, sleep apnea, overactive bladder, iron deficiency anemia, and a right total hip arthroplasty in April 2025 coming in with an infected surgical site on that hip.  He has been dealing with increased edema, redness, and tenderness of that right hip.  Was seen at [facility] urgent care 7/11 and started on doxycycline and then later again put on Augmentin 7/16/2025.  The symptoms of gotten worse gradually.  Unfortunately cannot bear weight now.  Actually had a fall yesterday falling on his right side waking up with bilateral ankle swelling.  Patient denies any other symptoms like fever, chills, sweats.  Has no current nausea or vomiting.  He has no chest pain.  Ortho see the patient and wants to hold off on antibiotics at this point.    In the ER , the patient had a BMP showinga normal creatinine 1.15, BUN 11.2, potassium 3.9, CRP up at 130 but this is better from 161 on 7/11/25, gluconse 80, lactic acid was normal at 0.8, he does have a leukocytosis at 17.7 which is higher than 12.6 on 7/11.  His hb 10.2 with an MCV of 85 and plt of 344. Blood culture was sent.  Patient getting admitted for ongoing infected surgical hip    Problem list:    #Postoperative state with recent right MARGO back in April 2025  #Suspected right infected surgical hip  #Recent fall with ankle pain       -Formal orthopedic service evaluation requested during admission process  I reviewed most recent orthopedics note.  Intention For possible incision and drainage this coming Monday  -Holding systemic antibiotics for now until wound cultures are sent  -Will likely benefit for infectious disease service input if determined to  have a surgical hip infection  Continue to monitor infectious and inflammatory markers   -optimize pain control  - Will  await further recommendations from orthopedic service regarding patient's ankle pain and recent fall event    #History of mild mental retardation  -Patient mentioned that he lives independently at his own apartment with a close follow-up and visit from   -Social service following with us.  - Patient has a listed guardian    #History of bronchial asthma  -Not in exacerbation    #History of Patel's esophagus  #History of GERD  -Continued on home regimen of PPI    #History of benign essential hypertension   -Home regimen of prazosin was resumed    #History of CKD  -Appears to be at baseline and continue to monitor    Gout, hyperuricacidemia   Recently treated for gout with colchicine, steroid burst.  Should be on allopurinol so will resume.  Patient had not been taking at home    #History of depression  - On Cymbalta and Zyprexa    #History of morbidly obese  #History of LOREN  Complicates cares.  BMI of 45  Utilize appliance    #History of iron deficiency            Diet: Regular Diet Adult    DVT Prophylaxis: Pneumatic Compression Devices  May benefit for chemical DVT prophylaxis but will await further instructions from orthopedic service given intention for operative intervention  Germain Catheter: Not present  Lines: None     Cardiac Monitoring: None  Code Status: Full Code      Clinically Significant Risk Factors Present on Admission               # Hypoalbuminemia: Lowest albumin = 3.1 g/dL at 7/19/2025  2:31 PM, will monitor as appropriate     # Hypertension: Noted on problem list      # Anemia: based on hgb <11       # Morbid Obesity: Estimated body mass index is 47.6 kg/m  as calculated from the following:    Height as of this encounter: 1.829 m (6').    Weight as of this encounter: 159.2 kg (350 lb 15.6 oz).       # Asthma: noted on problem list        Social Drivers of Health     Depression: Not at risk (7/11/2025)    PHQ-2     PHQ-2 Score: 1   Recent Concern: Depression - At risk (6/5/2025)    PHQ-2     PHQ-2 Score: 4   Tobacco Use: Medium Risk (7/16/2025)    Patient History     Smoking Tobacco Use: Former     Smokeless Tobacco Use: Former     Passive Exposure: Current          Disposition Plan     Medically Ready for Discharge: Anticipated in 2-4 Days             Shakir Jean MD, MD  Hospitalist Service  [facility]  Securely message with [employer] (more info)  Text page via [facility] Paging/Directory   ______________________________________________________________________    Interval History   I assume medicine service care.  Case discussed with nursing service.  I met this gentleman this morning.  He is sleeping comfortably in bed.  He still complains of intermittent hip pain.  Fortunately he is endorsing no nausea or vomiting.  He appears to be at baseline mental state.  Still demonstrating stable hemodynamics.  Remained afebrile.  No complaints of any chest pain or shortness of breath.      Physical Exam   Vital Signs: Temp: 98.9  F (37.2  C) Temp src: Temporal BP: (!) 143/59 Pulse: 97   Resp: 20 SpO2: 96 % O2 Device: BiPAP/CPAP (C-PAP from home)    Weight: 350 lbs 15.56 oz    HEENT; Atraumatic, normocephalic, pinkish conjuctiva, pupils bilateral reactive   Obese  Skin: warm and moist, no rashes  Lymphatics: no cervical or axillary lymphandenopathy  Lungs: equal chest expansion, clear to auscultation, no wheezes, no stridor, no crackles,   Heart: normal rate, normal rhythm, no rubs or gallops.   Abdomen: normal bowel sounds, no tenderness, no peritoneal signs, no guarding  Extremities: no deformities, bilateral lower extremity swelling right hip with  Presence of erythema  Neuro; follow commands, alert and oriented x3, spontaneous speech, coherent, moves all extremities spontaneously  Psych; no hallucination, euthymic mood, not agitated      Medical Decision  Making       50 MINUTES SPENT BY ME on the date of service doing chart review, history, exam, documentation & further activities per the note.  MANAGEMENT DISCUSSED with the following over the past 24 hours: Yes   NOTE(S)/MEDICAL RECORDS REVIEWED over the past 24 hours: Yes      Data     I have personally reviewed the following data over the past 24 hrs:    15.1 (H)  \   9.7 (L)   / 318     138 104 12.2 /  119 (H)   3.9 24 1.06 \     ALT: 21 AST: 15 AP: 67 TBILI: 0.3   ALB: 3.1 (L) TOT PROTEIN: 6.8 LIPASE: N/A     Procal: N/A CRP: 140.27 (H) Lactic Acid: 0.8       INR:  1.09 PTT:  N/A   D-dimer:  N/A Fibrinogen:  N/A       Imaging results reviewed over the past 24 hrs:   Recent Results (from the past 24 hours)   CT Hip Right w Contrast    Narrative    EXAM: CT HIP RIGHT W CONTRAST  LOCATION: [facility]  DATE: 7/19/2025    INDICATION: right hip replacement, multiple falls pain, abscess? seroma?  COMPARISON: Radiographs 07/19/2025. CT 07/05/2025.   TECHNIQUE: Noncontrast. Axial, sagittal and coronal thin-section reconstruction. Dose reduction techniques were used.     FINDINGS:     Right total hip arthroplasty. There is associated metallic streak artifact. No CT evidence of periprosthetic fracture. Unchanged cystic appearing lucency in the right acetabulum, present on preoperative CT 03/23/2023. Partial noninstrumented ankylosis of   the right sacroiliac joint. Degenerative change of the pubic symphysis. Demineralization.    Right sacral nerve stimulator.    Interval increase in size of now large fluid collection within the soft tissues of the right hip now containing internal foci of gas. The collection measures at least 16.6 x 12.6 x 19.2 cm. The collection extends from the lateral skin surface through the   subcutaneous tissues and the lateral hip fascia likely contiguous with the joint space. There are small organized inferior extension within the vastus lateralis (series 4 image  142).  -Chronic strain of the proximal rectus femoris. Moderate atrophy of the gluteus minimus muscle.    Likely reactive right inguinal/iliac lymph nodes. Partially imaged cystic finding in the mesentery which could represent prominent loops of bowel or an indolent cystic lesion such as a lymphangioma present dating back to CT 02/18/2022.      Impression    IMPRESSION:  1.  Right total hip arthroplasty. Interval increase in size of large right hip fluid and gas collection extending from the lateral skin surface through the subcutaneous tissues and lateral hip fascia likely contiguous with the joint space. Aspiration is   recommended.       XR Ankle Bilateral G/E 3 Views    Narrative    EXAM: XR ANKLE BILATERAL G/E 3 VIEWS, XR FOOT LEFT G/E 3 VIEWS  LOCATION: [facility]  DATE: 07/19/2025    INDICATION: Fall, ankle pain.  COMPARISON: None.      Impression    IMPRESSION:  Left foot and ankle: Hallux valgus with mild 1st MTP joint arthrosis. Small well-corticated bone fragment along the lateral aspect of the 1st MTP joint space is chronic in appearance. There is no evidence of an acute displaced left foot or ankle   fracture. Hammertoe deformities. Ankle mortise is intact.    Right ankle: Right ankle shows circumferential soft tissue swelling. Ankle mortise is intact. There is no evidence of an acute displaced fracture. Small calcaneal heel spur.   Foot XR, G/E 3 views, left    Narrative    EXAM: XR ANKLE BILATERAL G/E 3 VIEWS, XR FOOT LEFT G/E 3 VIEWS  LOCATION: [facility]  DATE: 07/19/2025    INDICATION: Fall, ankle pain.  COMPARISON: None.      Impression    IMPRESSION:  Left foot and ankle: Hallux valgus with mild 1st MTP joint arthrosis. Small well-corticated bone fragment along the lateral aspect of the 1st MTP joint space is chronic in appearance. There is no evidence of an acute displaced left foot or ankle   fracture. Hammertoe deformities. Ankle mortise is  intact.    Right ankle: Right ankle shows circumferential soft tissue swelling. Ankle mortise is intact. There is no evidence of an acute displaced fracture. Small calcaneal heel spur.   XR Pelvis and Hip Right 2 Views    Narrative    EXAM: XR PELVIS AND HIP RIGHT 2 VIEWS  LOCATION: [facility]  DATE: 7/19/2025    INDICATION: unable to weightbear, s p MARGO  COMPARISON: CT 07/19/2025       Impression    IMPRESSION: Right total hip arthroplasty. No periprosthetic fracture or concerning lucency. Small amount of cystic change in the acetabulum present on preoperative CT. Left total hip arthroplasty is noted. Noninstrumented at least partial ankylosis of   the sacroiliac joints. Right sacral nerve stimulator. Contrast opacifies the collecting system. Mottled gas present lateral to the hip corresponding to fluid collection identified on CT.

## 2025-07-20 NOTE — PROGRESS NOTES
ORTHOPAEDIC SURGERY STAFF PROGRESS NOTE    Patient admitted due to persistent infection.  States that his symptoms are similar today.    BP (!) 143/59 (BP Location: Right arm)   Pulse 97   Temp 98.9  F (37.2  C) (Temporal)   Resp 20   Ht 1.829 m (6')   Wt (!) 159.2 kg (350 lb 15.6 oz)   SpO2 96%   BMI 47.60 kg/m        Right LE-  Right lower extremity wound with significant serous weepage  +extensor hallucis longus, flexor hallucis longus, tibialis anterior, gastroc-soleus complex, Quadriceps   Lateral lower extremity swelling      Diagnosis: Right postoperative infection following MARGO    Plan: Discussed with the patient the plan, which entails surgery tomorrow with Dr. Randall's Team.  Consent will have to be performed with his legal guardian.  Does not require an aspiration as we will plan to proceed with surgery.      Multimodal pain control  DVT ppx -hold  PT/OT  WBAT  N.p.o. after midnight  Preop labs ordered  Dispo per primary    I spent a total of 45 minutes taking care of this patient including chart review of other provider notes, coordination of care with Dr. Randall, laboratory/imaging interpretation, examination of the patient, counseling patient and their family, and documentation for this progress note.    Ziggy Ridley MD

## 2025-07-20 NOTE — PLAN OF CARE
"Goal Outcome Evaluation:      Plan of Care Reviewed With: patient, parent    Overall Patient Progress: no changeOverall Patient Progress: no change     Pt arrived to unit around 1820. A&Ox4. Mother updated via phone. VSS on room air. Scheduled tylenol as well as prn atarax and oxycodone given for pain to R hip and bilateral feet. Denies SOB or nausea. IV SL, fluids held. Tolerating regular diet. Not OOB yet, repos self in bed. Uses walker/cane at baseline, walker in room. Feet elevated on pillows. Abscess/wound to R hip producing moderate drainage, dressing changed. Swelling/redness noted. Voiding well with urinal. Plan is for I&D Monday per ortho note.    Problem: Adult Inpatient Plan of Care  Goal: Plan of Care Review  Description: The Plan of Care Review/Shift note should be completed every shift.  The Outcome Evaluation is a brief statement about your assessment that the patient is improving, declining, or no change.  This information will be displayed automatically on your shift  note.  7/19/2025 2131 by Ana Martinez RN  Outcome: Progressing  Flowsheets (Taken 7/19/2025 2131)  Plan of Care Reviewed With:   patient   parent  Overall Patient Progress: no change  7/19/2025 2129 by Ana Martinez RN  Outcome: Progressing  Flowsheets (Taken 7/19/2025 2129)  Plan of Care Reviewed With:   patient   parent  Overall Patient Progress: no change  Goal: Patient-Specific Goal (Individualized)  Description: You can add care plan individualizations to a care plan. Examples of Individualization might be:  \"Parent requests to be called daily at 9am for status\", \"I have a hard time hearing out of my right ear\", or \"Do not touch me to wake me up as it startles  me\".  7/19/2025 2131 by Ana Martinez, RN  Outcome: Progressing  7/19/2025 2129 by Ana Martinez, RN  Outcome: Progressing  Goal: Absence of Hospital-Acquired Illness or Injury  7/19/2025 2131 by Ana Martinez, RN  Outcome: Progressing  7/19/2025 2129 by " Martinez, Ana L, RN  Outcome: Progressing  Intervention: Identify and Manage Fall Risk  Recent Flowsheet Documentation  Taken 7/19/2025 1910 by Ana Martinez RN  Safety Promotion/Fall Prevention: safety round/check completed  Intervention: Prevent Skin Injury  Recent Flowsheet Documentation  Taken 7/19/2025 1948 by Ana Martinez RN  Body Position:   turned   left   side-lying  Taken 7/19/2025 1910 by Ana Martinez RN  Body Position: position changed independently  Skin Protection:   adhesive use limited   incontinence pads utilized  Intervention: Prevent and Manage VTE (Venous Thromboembolism) Risk  Recent Flowsheet Documentation  Taken 7/19/2025 1910 by Ana Martinez RN  VTE Prevention/Management: SCDs off (sequential compression devices)  Intervention: Prevent Infection  Recent Flowsheet Documentation  Taken 7/19/2025 1910 by Ana Martinez RN  Infection Prevention:   hand hygiene promoted   rest/sleep promoted   single patient room provided  Goal: Optimal Comfort and Wellbeing  7/19/2025 2131 by Ana Martinez RN  Outcome: Progressing  7/19/2025 2129 by Ana Martinez RN  Outcome: Progressing  Intervention: Monitor Pain and Promote Comfort  Recent Flowsheet Documentation  Taken 7/19/2025 2120 by Ana Martinez RN  Pain Management Interventions: medication (see MAR)  Taken 7/19/2025 1948 by Ana Martinez RN  Pain Management Interventions:   rest   repositioned  Taken 7/19/2025 1904 by Ana Martinez RN  Pain Management Interventions: medication (see MAR)  Taken 7/19/2025 1822 by Ana Martinez RN  Pain Management Interventions:   rest   repositioned  Goal: Readiness for Transition of Care  7/19/2025 2131 by Ana Martinez RN  Outcome: Progressing  7/19/2025 2129 by Ana Martinez RN  Outcome: Progressing  Intervention: Mutually Develop Transition Plan  Recent Flowsheet Documentation  Taken 7/19/2025 1900 by Ana Martinez RN  Equipment Currently Used at Home:   cane, straight    walker, rolling   grab bar, toilet   grab bar, tub/shower     Problem: Infection  Goal: Absence of Infection Signs and Symptoms  Outcome: Progressing     Problem: Pain Acute  Goal: Optimal Pain Control and Function  Outcome: Progressing

## 2025-07-20 NOTE — PROVIDER NOTIFICATION
07/19/25 2320   Tech Time   $Tech Time (10 minute increments) 5   Mode: CPAP/ BiPAP/ AVAPS/ AVAPS AE   CPAP/BiPAP/ AVAPS/ AVAPS AE Mode CPAP   Equipment   Device resmed   CPAP/BiPAP/Settings   $CPAP/BiPAP Initial completed   Oxygen (%) 21   CPAP/BiPAP Patient Parameters   CPAP (cm H2O)   (autoset 5-20)   RT Device Skin Assessment   Oxygen Delivery Device CPAP/BiPAP Mask   Interface Face Mask - Medium   Ventilator Arm In Place No   Site Appearance neck circumference Clean and dry   Site Appearance bridge of nose Clean and dry   Site Appearance occiput Clean and dry   Strap Tightness Finger Allowance between head and device strap   Device Skin Interventions Taken No adjustments needed     Placed patient on night time CPAP.    Khalif Gilman, RT, RT  7/19/2025 11:40 PM

## 2025-07-20 NOTE — CONSULTS
Care Management Follow Up    Length of Stay (days): 1    Expected Discharge Date: 07/21/2025     Concerns to be Addressed: all concerns addressed in this encounter     Patient plan of care discussed at interdisciplinary rounds: Yes    Anticipated Discharge Disposition:                Anticipated Discharge Services:    Anticipated Discharge DME:      Patient/family educated on Medicare website which has current facility and service quality ratings:    Education Provided on the Discharge Plan:    Patient/Family in Agreement with the Plan:      Referrals Placed by CM/SW:    Private pay costs discussed: Not applicable    Discussed  Partnership in Safe Discharge Planning  document with patient/family: No     Handoff Completed: Yes, [facility] PCP: Internal handoff referral completed    Additional Information:  Social work currently following patient. See  - Clinical consult note on 7/19/2025.    Next Steps: Social Work will continue to be available for discharge planning.     SKYE Silva

## 2025-07-21 ENCOUNTER — ANESTHESIA (OUTPATIENT)
Dept: SURGERY | Facility: CLINIC | Age: 45
End: 2025-07-21
Payer: COMMERCIAL

## 2025-07-21 ENCOUNTER — APPOINTMENT (OUTPATIENT)
Dept: GENERAL RADIOLOGY | Facility: CLINIC | Age: 45
DRG: 464 | End: 2025-07-21
Attending: ORTHOPAEDIC SURGERY
Payer: COMMERCIAL

## 2025-07-21 ENCOUNTER — ANESTHESIA EVENT (OUTPATIENT)
Dept: SURGERY | Facility: CLINIC | Age: 45
End: 2025-07-21
Payer: COMMERCIAL

## 2025-07-21 LAB
APTT PPP: 35 SECONDS (ref 22–38)
BACTERIA SPEC CULT: ABNORMAL
BACTERIA SPEC CULT: NORMAL
BLD PROD TYP BPU: NORMAL
BLOOD COMPONENT TYPE: NORMAL
CODING SYSTEM: NORMAL
CROSSMATCH: NORMAL
FIBRINOGEN PPP-MCNC: 605 MG/DL (ref 170–510)
GLUCOSE BLDC GLUCOMTR-MCNC: 72 MG/DL (ref 70–99)
GRAM STAIN RESULT: ABNORMAL
GRAM STAIN RESULT: ABNORMAL
GRAM STAIN RESULT: NORMAL
HGB BLD-MCNC: 9.7 G/DL (ref 13.3–17.7)
INR PPP: 1.2 (ref 0.85–1.15)
ISSUE DATE AND TIME: NORMAL
MCV RBC AUTO: 85 FL (ref 78–100)
PROTHROMBIN TIME: 15.3 SECONDS (ref 11.8–14.8)
UNIT ABO/RH: NORMAL
UNIT NUMBER: NORMAL
UNIT STATUS: NORMAL
UNIT TYPE ISBT: 5100

## 2025-07-21 PROCEDURE — 99232 SBSQ HOSP IP/OBS MODERATE 35: CPT | Performed by: INTERNAL MEDICINE

## 2025-07-21 PROCEDURE — 0SUR09Z SUPPLEMENT RIGHT HIP JOINT, FEMORAL SURFACE WITH LINER, OPEN APPROACH: ICD-10-PCS | Performed by: ORTHOPAEDIC SURGERY

## 2025-07-21 PROCEDURE — P9016 RBC LEUKOCYTES REDUCED: HCPCS | Performed by: ANESTHESIOLOGY

## 2025-07-21 PROCEDURE — 250N000009 HC RX 250: Performed by: ORTHOPAEDIC SURGERY

## 2025-07-21 PROCEDURE — 87075 CULTR BACTERIA EXCEPT BLOOD: CPT | Performed by: ORTHOPAEDIC SURGERY

## 2025-07-21 PROCEDURE — 250N000011 HC RX IP 250 OP 636: Performed by: ORTHOPAEDIC SURGERY

## 2025-07-21 PROCEDURE — 999N000141 HC STATISTIC PRE-PROCEDURE NURSING ASSESSMENT: Performed by: ORTHOPAEDIC SURGERY

## 2025-07-21 PROCEDURE — 360N000076 HC SURGERY LEVEL 3, PER MIN: Performed by: ORTHOPAEDIC SURGERY

## 2025-07-21 PROCEDURE — C1713 ANCHOR/SCREW BN/BN,TIS/BN: HCPCS | Performed by: ORTHOPAEDIC SURGERY

## 2025-07-21 PROCEDURE — 250N000011 HC RX IP 250 OP 636: Performed by: INTERNAL MEDICINE

## 2025-07-21 PROCEDURE — 250N000011 HC RX IP 250 OP 636: Performed by: NURSE ANESTHETIST, CERTIFIED REGISTERED

## 2025-07-21 PROCEDURE — 36415 COLL VENOUS BLD VENIPUNCTURE: CPT | Performed by: ANESTHESIOLOGY

## 2025-07-21 PROCEDURE — C1776 JOINT DEVICE (IMPLANTABLE): HCPCS | Performed by: ORTHOPAEDIC SURGERY

## 2025-07-21 PROCEDURE — 250N000013 HC RX MED GY IP 250 OP 250 PS 637: Performed by: ORTHOPAEDIC SURGERY

## 2025-07-21 PROCEDURE — 250N000009 HC RX 250: Performed by: NURSE ANESTHETIST, CERTIFIED REGISTERED

## 2025-07-21 PROCEDURE — 120N000001 HC R&B MED SURG/OB

## 2025-07-21 PROCEDURE — 85018 HEMOGLOBIN: CPT | Performed by: ANESTHESIOLOGY

## 2025-07-21 PROCEDURE — 258N000003 HC RX IP 258 OP 636: Performed by: NURSE ANESTHETIST, CERTIFIED REGISTERED

## 2025-07-21 PROCEDURE — 272N000001 HC OR GENERAL SUPPLY STERILE: Performed by: ORTHOPAEDIC SURGERY

## 2025-07-21 PROCEDURE — 0SPR0JZ REMOVAL OF SYNTHETIC SUBSTITUTE FROM RIGHT HIP JOINT, FEMORAL SURFACE, OPEN APPROACH: ICD-10-PCS | Performed by: ORTHOPAEDIC SURGERY

## 2025-07-21 PROCEDURE — 85384 FIBRINOGEN ACTIVITY: CPT | Performed by: ANESTHESIOLOGY

## 2025-07-21 PROCEDURE — 258N000001 HC RX 258: Performed by: ORTHOPAEDIC SURGERY

## 2025-07-21 PROCEDURE — 258N000003 HC RX IP 258 OP 636: Performed by: ANESTHESIOLOGY

## 2025-07-21 PROCEDURE — 250N000013 HC RX MED GY IP 250 OP 250 PS 637: Performed by: NURSE ANESTHETIST, CERTIFIED REGISTERED

## 2025-07-21 PROCEDURE — 370N000017 HC ANESTHESIA TECHNICAL FEE, PER MIN: Performed by: ORTHOPAEDIC SURGERY

## 2025-07-21 PROCEDURE — 85730 THROMBOPLASTIN TIME PARTIAL: CPT | Performed by: ANESTHESIOLOGY

## 2025-07-21 PROCEDURE — 710N000009 HC RECOVERY PHASE 1, LEVEL 1, PER MIN: Performed by: ORTHOPAEDIC SURGERY

## 2025-07-21 PROCEDURE — 999N000157 HC STATISTIC RCP TIME EA 10 MIN

## 2025-07-21 PROCEDURE — 87070 CULTURE OTHR SPECIMN AEROBIC: CPT | Performed by: ORTHOPAEDIC SURGERY

## 2025-07-21 PROCEDURE — 250N000011 HC RX IP 250 OP 636: Performed by: ANESTHESIOLOGY

## 2025-07-21 PROCEDURE — 99222 1ST HOSP IP/OBS MODERATE 55: CPT | Performed by: INTERNAL MEDICINE

## 2025-07-21 PROCEDURE — 250N000025 HC SEVOFLURANE, PER MIN: Performed by: ORTHOPAEDIC SURGERY

## 2025-07-21 PROCEDURE — 94660 CPAP INITIATION&MGMT: CPT

## 2025-07-21 PROCEDURE — 272N000002 HC OR SUPPLY OTHER OPNP: Performed by: ORTHOPAEDIC SURGERY

## 2025-07-21 PROCEDURE — P9045 ALBUMIN (HUMAN), 5%, 250 ML: HCPCS | Mod: JZ | Performed by: NURSE ANESTHETIST, CERTIFIED REGISTERED

## 2025-07-21 PROCEDURE — 87205 SMEAR GRAM STAIN: CPT | Performed by: ORTHOPAEDIC SURGERY

## 2025-07-21 PROCEDURE — 258N000003 HC RX IP 258 OP 636: Performed by: ORTHOPAEDIC SURGERY

## 2025-07-21 PROCEDURE — 0SP909Z REMOVAL OF LINER FROM RIGHT HIP JOINT, OPEN APPROACH: ICD-10-PCS | Performed by: ORTHOPAEDIC SURGERY

## 2025-07-21 PROCEDURE — 85610 PROTHROMBIN TIME: CPT | Performed by: ANESTHESIOLOGY

## 2025-07-21 PROCEDURE — 258N000003 HC RX IP 258 OP 636: Performed by: INTERNAL MEDICINE

## 2025-07-21 PROCEDURE — 999N000065 XR PELVIS AND HIP PORTABLE RIGHT 1 VIEW

## 2025-07-21 DEVICE — IMPLANTABLE DEVICE: Type: IMPLANTABLE DEVICE | Site: HIP | Status: FUNCTIONAL

## 2025-07-21 DEVICE — IMP SLEEVE BIOM TYPE1 TPR FOR BIOLOX DELTA +3MM 650-1067: Type: IMPLANTABLE DEVICE | Site: HIP | Status: FUNCTIONAL

## 2025-07-21 DEVICE — IMP HEAD FEM BIOM BIOLOX DELTA OPTION 28MM 650-1055: Type: IMPLANTABLE DEVICE | Site: HIP | Status: FUNCTIONAL

## 2025-07-21 RX ORDER — TRANEXAMIC ACID 10 MG/ML
1 INJECTION, SOLUTION INTRAVENOUS ONCE
Status: COMPLETED | OUTPATIENT
Start: 2025-07-21 | End: 2025-07-21

## 2025-07-21 RX ORDER — KETOCONAZOLE 20 MG/G
CREAM TOPICAL 2 TIMES DAILY PRN
Status: DISCONTINUED | OUTPATIENT
Start: 2025-07-21 | End: 2025-07-28 | Stop reason: HOSPADM

## 2025-07-21 RX ORDER — HYDROXYZINE HYDROCHLORIDE 25 MG/1
25-50 TABLET, FILM COATED ORAL 4 TIMES DAILY PRN
Status: DISCONTINUED | OUTPATIENT
Start: 2025-07-21 | End: 2025-07-21 | Stop reason: ALTCHOICE

## 2025-07-21 RX ORDER — ENOXAPARIN SODIUM 100 MG/ML
40 INJECTION SUBCUTANEOUS EVERY 12 HOURS
Status: DISCONTINUED | OUTPATIENT
Start: 2025-07-22 | End: 2025-07-28 | Stop reason: HOSPADM

## 2025-07-21 RX ORDER — FENTANYL CITRATE 50 UG/ML
50 INJECTION, SOLUTION INTRAMUSCULAR; INTRAVENOUS EVERY 5 MIN PRN
Status: DISCONTINUED | OUTPATIENT
Start: 2025-07-21 | End: 2025-07-21 | Stop reason: HOSPADM

## 2025-07-21 RX ORDER — SODIUM CHLORIDE, SODIUM LACTATE, POTASSIUM CHLORIDE, CALCIUM CHLORIDE 600; 310; 30; 20 MG/100ML; MG/100ML; MG/100ML; MG/100ML
INJECTION, SOLUTION INTRAVENOUS CONTINUOUS
Status: DISCONTINUED | OUTPATIENT
Start: 2025-07-21 | End: 2025-07-22

## 2025-07-21 RX ORDER — HYDROMORPHONE HCL IN WATER/PF 6 MG/30 ML
0.2 PATIENT CONTROLLED ANALGESIA SYRINGE INTRAVENOUS
Status: DISCONTINUED | OUTPATIENT
Start: 2025-07-21 | End: 2025-07-28 | Stop reason: HOSPADM

## 2025-07-21 RX ORDER — KETOCONAZOLE 20 MG/ML
SHAMPOO, SUSPENSION TOPICAL DAILY PRN
Status: DISCONTINUED | OUTPATIENT
Start: 2025-07-21 | End: 2025-07-28 | Stop reason: HOSPADM

## 2025-07-21 RX ORDER — OXYCODONE HYDROCHLORIDE 10 MG/1
10 TABLET ORAL EVERY 4 HOURS PRN
Status: DISCONTINUED | OUTPATIENT
Start: 2025-07-21 | End: 2025-07-28 | Stop reason: HOSPADM

## 2025-07-21 RX ORDER — PROPOFOL 10 MG/ML
INJECTION, EMULSION INTRAVENOUS CONTINUOUS PRN
Status: DISCONTINUED | OUTPATIENT
Start: 2025-07-21 | End: 2025-07-21

## 2025-07-21 RX ORDER — GLYCINE 1.5 G/100ML
SOLUTION IRRIGATION PRN
Status: DISCONTINUED | OUTPATIENT
Start: 2025-07-21 | End: 2025-07-21 | Stop reason: HOSPADM

## 2025-07-21 RX ORDER — ALBUTEROL SULFATE 90 UG/1
INHALANT RESPIRATORY (INHALATION) PRN
Status: DISCONTINUED | OUTPATIENT
Start: 2025-07-21 | End: 2025-07-21

## 2025-07-21 RX ORDER — LIDOCAINE 40 MG/G
CREAM TOPICAL
Status: DISCONTINUED | OUTPATIENT
Start: 2025-07-21 | End: 2025-07-22

## 2025-07-21 RX ORDER — HYDROMORPHONE HCL IN WATER/PF 6 MG/30 ML
0.4 PATIENT CONTROLLED ANALGESIA SYRINGE INTRAVENOUS
Status: DISCONTINUED | OUTPATIENT
Start: 2025-07-21 | End: 2025-07-28 | Stop reason: HOSPADM

## 2025-07-21 RX ORDER — FENTANYL CITRATE 50 UG/ML
25 INJECTION, SOLUTION INTRAMUSCULAR; INTRAVENOUS EVERY 5 MIN PRN
Status: DISCONTINUED | OUTPATIENT
Start: 2025-07-21 | End: 2025-07-21 | Stop reason: HOSPADM

## 2025-07-21 RX ORDER — KETOROLAC TROMETHAMINE 15 MG/ML
15 INJECTION, SOLUTION INTRAMUSCULAR; INTRAVENOUS
Status: DISCONTINUED | OUTPATIENT
Start: 2025-07-21 | End: 2025-07-21 | Stop reason: HOSPADM

## 2025-07-21 RX ORDER — ACETAMINOPHEN 325 MG/1
975 TABLET ORAL EVERY 8 HOURS
Status: COMPLETED | OUTPATIENT
Start: 2025-07-21 | End: 2025-07-24

## 2025-07-21 RX ORDER — DEXAMETHASONE SODIUM PHOSPHATE 4 MG/ML
4 INJECTION, SOLUTION INTRA-ARTICULAR; INTRALESIONAL; INTRAMUSCULAR; INTRAVENOUS; SOFT TISSUE
Status: DISCONTINUED | OUTPATIENT
Start: 2025-07-21 | End: 2025-07-21 | Stop reason: HOSPADM

## 2025-07-21 RX ORDER — LIDOCAINE 40 MG/G
CREAM TOPICAL
Status: DISCONTINUED | OUTPATIENT
Start: 2025-07-21 | End: 2025-07-28 | Stop reason: HOSPADM

## 2025-07-21 RX ORDER — CEFTRIAXONE 2 G/1
2 INJECTION, POWDER, FOR SOLUTION INTRAMUSCULAR; INTRAVENOUS EVERY 24 HOURS
Status: DISCONTINUED | OUTPATIENT
Start: 2025-07-21 | End: 2025-07-23

## 2025-07-21 RX ORDER — PLANT STANOL ESTER 450 MG
2.5 TABLET ORAL DAILY
Status: DISCONTINUED | OUTPATIENT
Start: 2025-07-21 | End: 2025-07-21

## 2025-07-21 RX ORDER — NALOXONE HYDROCHLORIDE 0.4 MG/ML
0.1 INJECTION, SOLUTION INTRAMUSCULAR; INTRAVENOUS; SUBCUTANEOUS
Status: DISCONTINUED | OUTPATIENT
Start: 2025-07-21 | End: 2025-07-21 | Stop reason: HOSPADM

## 2025-07-21 RX ORDER — SODIUM CHLORIDE, SODIUM LACTATE, POTASSIUM CHLORIDE, CALCIUM CHLORIDE 600; 310; 30; 20 MG/100ML; MG/100ML; MG/100ML; MG/100ML
INJECTION, SOLUTION INTRAVENOUS CONTINUOUS PRN
Status: DISCONTINUED | OUTPATIENT
Start: 2025-07-21 | End: 2025-07-21

## 2025-07-21 RX ORDER — ONDANSETRON 4 MG/1
4 TABLET, ORALLY DISINTEGRATING ORAL EVERY 30 MIN PRN
Status: DISCONTINUED | OUTPATIENT
Start: 2025-07-21 | End: 2025-07-21 | Stop reason: HOSPADM

## 2025-07-21 RX ORDER — SODIUM CHLORIDE, SODIUM LACTATE, POTASSIUM CHLORIDE, CALCIUM CHLORIDE 600; 310; 30; 20 MG/100ML; MG/100ML; MG/100ML; MG/100ML
INJECTION, SOLUTION INTRAVENOUS CONTINUOUS
Status: DISCONTINUED | OUTPATIENT
Start: 2025-07-21 | End: 2025-07-26

## 2025-07-21 RX ORDER — FENTANYL CITRATE 50 UG/ML
INJECTION, SOLUTION INTRAMUSCULAR; INTRAVENOUS PRN
Status: DISCONTINUED | OUTPATIENT
Start: 2025-07-21 | End: 2025-07-21

## 2025-07-21 RX ORDER — CEFAZOLIN SODIUM 2 G/50ML
2 SOLUTION INTRAVENOUS EVERY 8 HOURS
Status: DISCONTINUED | OUTPATIENT
Start: 2025-07-21 | End: 2025-07-21 | Stop reason: ALTCHOICE

## 2025-07-21 RX ORDER — CEFAZOLIN SODIUM/WATER 3 G/30 ML
3 SYRINGE (ML) INTRAVENOUS SEE ADMIN INSTRUCTIONS
Status: DISCONTINUED | OUTPATIENT
Start: 2025-07-21 | End: 2025-07-21 | Stop reason: ALTCHOICE

## 2025-07-21 RX ORDER — HYDROMORPHONE HCL IN WATER/PF 6 MG/30 ML
0.2 PATIENT CONTROLLED ANALGESIA SYRINGE INTRAVENOUS EVERY 5 MIN PRN
Status: DISCONTINUED | OUTPATIENT
Start: 2025-07-21 | End: 2025-07-21 | Stop reason: HOSPADM

## 2025-07-21 RX ORDER — FAMOTIDINE 20 MG/1
20 TABLET, FILM COATED ORAL 2 TIMES DAILY
Status: DISCONTINUED | OUTPATIENT
Start: 2025-07-21 | End: 2025-07-28 | Stop reason: HOSPADM

## 2025-07-21 RX ORDER — ONDANSETRON 2 MG/ML
4 INJECTION INTRAMUSCULAR; INTRAVENOUS EVERY 30 MIN PRN
Status: DISCONTINUED | OUTPATIENT
Start: 2025-07-21 | End: 2025-07-21 | Stop reason: HOSPADM

## 2025-07-21 RX ORDER — ONDANSETRON 2 MG/ML
INJECTION INTRAMUSCULAR; INTRAVENOUS PRN
Status: DISCONTINUED | OUTPATIENT
Start: 2025-07-21 | End: 2025-07-21

## 2025-07-21 RX ORDER — HYDROCORTISONE 25 MG/G
CREAM TOPICAL 2 TIMES DAILY
Status: DISCONTINUED | OUTPATIENT
Start: 2025-07-21 | End: 2025-07-28 | Stop reason: HOSPADM

## 2025-07-21 RX ORDER — TRANEXAMIC ACID 10 MG/ML
1 INJECTION, SOLUTION INTRAVENOUS ONCE
Status: DISCONTINUED | OUTPATIENT
Start: 2025-07-21 | End: 2025-07-24

## 2025-07-21 RX ORDER — ACETAMINOPHEN 325 MG/1
975 TABLET ORAL ONCE
Status: DISCONTINUED | OUTPATIENT
Start: 2025-07-21 | End: 2025-07-21 | Stop reason: HOSPADM

## 2025-07-21 RX ORDER — DIPHENHYDRAMINE HCL 25 MG
25 CAPSULE ORAL EVERY 6 HOURS PRN
Status: DISCONTINUED | OUTPATIENT
Start: 2025-07-21 | End: 2025-07-28 | Stop reason: HOSPADM

## 2025-07-21 RX ORDER — PROCHLORPERAZINE MALEATE 5 MG/1
10 TABLET ORAL EVERY 6 HOURS PRN
Status: DISCONTINUED | OUTPATIENT
Start: 2025-07-21 | End: 2025-07-28 | Stop reason: HOSPADM

## 2025-07-21 RX ORDER — OXYCODONE HYDROCHLORIDE 5 MG/1
5 TABLET ORAL EVERY 4 HOURS PRN
Status: DISCONTINUED | OUTPATIENT
Start: 2025-07-21 | End: 2025-07-28 | Stop reason: HOSPADM

## 2025-07-21 RX ORDER — AMOXICILLIN 250 MG
1 CAPSULE ORAL 2 TIMES DAILY
Status: DISCONTINUED | OUTPATIENT
Start: 2025-07-21 | End: 2025-07-28 | Stop reason: HOSPADM

## 2025-07-21 RX ORDER — SODIUM CHLORIDE, SODIUM LACTATE, POTASSIUM CHLORIDE, CALCIUM CHLORIDE 600; 310; 30; 20 MG/100ML; MG/100ML; MG/100ML; MG/100ML
INJECTION, SOLUTION INTRAVENOUS CONTINUOUS
Status: DISCONTINUED | OUTPATIENT
Start: 2025-07-21 | End: 2025-07-21 | Stop reason: HOSPADM

## 2025-07-21 RX ORDER — BISACODYL 10 MG
10 SUPPOSITORY, RECTAL RECTAL DAILY PRN
Status: DISCONTINUED | OUTPATIENT
Start: 2025-07-21 | End: 2025-07-28 | Stop reason: HOSPADM

## 2025-07-21 RX ORDER — SODIUM CHLORIDE 9 MG/ML
INJECTION, SOLUTION INTRAVENOUS CONTINUOUS PRN
Status: DISCONTINUED | OUTPATIENT
Start: 2025-07-21 | End: 2025-07-21

## 2025-07-21 RX ORDER — VANCOMYCIN HYDROCHLORIDE 1 G/20ML
INJECTION, POWDER, LYOPHILIZED, FOR SOLUTION INTRAVENOUS PRN
Status: DISCONTINUED | OUTPATIENT
Start: 2025-07-21 | End: 2025-07-21 | Stop reason: HOSPADM

## 2025-07-21 RX ORDER — DEXAMETHASONE SODIUM PHOSPHATE 4 MG/ML
INJECTION, SOLUTION INTRA-ARTICULAR; INTRALESIONAL; INTRAMUSCULAR; INTRAVENOUS; SOFT TISSUE PRN
Status: DISCONTINUED | OUTPATIENT
Start: 2025-07-21 | End: 2025-07-21

## 2025-07-21 RX ORDER — POLYETHYLENE GLYCOL 3350 17 G/17G
17 POWDER, FOR SOLUTION ORAL DAILY
Status: DISCONTINUED | OUTPATIENT
Start: 2025-07-22 | End: 2025-07-28 | Stop reason: HOSPADM

## 2025-07-21 RX ORDER — LOPERAMIDE HYDROCHLORIDE 2 MG/1
2 CAPSULE ORAL 4 TIMES DAILY PRN
Status: DISCONTINUED | OUTPATIENT
Start: 2025-07-21 | End: 2025-07-28 | Stop reason: HOSPADM

## 2025-07-21 RX ORDER — HYDROXYZINE HYDROCHLORIDE 25 MG/1
25 TABLET, FILM COATED ORAL EVERY 6 HOURS PRN
Status: DISCONTINUED | OUTPATIENT
Start: 2025-07-21 | End: 2025-07-28 | Stop reason: HOSPADM

## 2025-07-21 RX ORDER — HYDRALAZINE HYDROCHLORIDE 20 MG/ML
2.5-5 INJECTION INTRAMUSCULAR; INTRAVENOUS EVERY 10 MIN PRN
Status: DISCONTINUED | OUTPATIENT
Start: 2025-07-21 | End: 2025-07-21 | Stop reason: HOSPADM

## 2025-07-21 RX ORDER — ONDANSETRON 2 MG/ML
4 INJECTION INTRAMUSCULAR; INTRAVENOUS EVERY 6 HOURS PRN
Status: DISCONTINUED | OUTPATIENT
Start: 2025-07-21 | End: 2025-07-28 | Stop reason: HOSPADM

## 2025-07-21 RX ORDER — METOPROLOL TARTRATE 1 MG/ML
1-2 INJECTION, SOLUTION INTRAVENOUS EVERY 5 MIN PRN
Status: DISCONTINUED | OUTPATIENT
Start: 2025-07-21 | End: 2025-07-21 | Stop reason: HOSPADM

## 2025-07-21 RX ORDER — HYDROMORPHONE HCL IN WATER/PF 6 MG/30 ML
0.4 PATIENT CONTROLLED ANALGESIA SYRINGE INTRAVENOUS EVERY 5 MIN PRN
Status: DISCONTINUED | OUTPATIENT
Start: 2025-07-21 | End: 2025-07-21 | Stop reason: HOSPADM

## 2025-07-21 RX ORDER — CEFAZOLIN SODIUM/WATER 3 G/30 ML
3 SYRINGE (ML) INTRAVENOUS
Status: COMPLETED | OUTPATIENT
Start: 2025-07-21 | End: 2025-07-21

## 2025-07-21 RX ORDER — LIDOCAINE HYDROCHLORIDE 20 MG/ML
INJECTION, SOLUTION INFILTRATION; PERINEURAL PRN
Status: DISCONTINUED | OUTPATIENT
Start: 2025-07-21 | End: 2025-07-21

## 2025-07-21 RX ORDER — VASOPRESSIN IN 0.9 % NACL 2 UNIT/2ML
SYRINGE (ML) INTRAVENOUS PRN
Status: DISCONTINUED | OUTPATIENT
Start: 2025-07-21 | End: 2025-07-21

## 2025-07-21 RX ORDER — PROPOFOL 10 MG/ML
INJECTION, EMULSION INTRAVENOUS PRN
Status: DISCONTINUED | OUTPATIENT
Start: 2025-07-21 | End: 2025-07-21

## 2025-07-21 RX ORDER — ONDANSETRON 4 MG/1
4 TABLET, ORALLY DISINTEGRATING ORAL EVERY 6 HOURS PRN
Status: DISCONTINUED | OUTPATIENT
Start: 2025-07-21 | End: 2025-07-28 | Stop reason: HOSPADM

## 2025-07-21 RX ORDER — TOBRAMYCIN 1.2 G/30ML
INJECTION, POWDER, LYOPHILIZED, FOR SOLUTION INTRAVENOUS PRN
Status: DISCONTINUED | OUTPATIENT
Start: 2025-07-21 | End: 2025-07-21 | Stop reason: HOSPADM

## 2025-07-21 RX ADMIN — ONDANSETRON 4 MG: 2 INJECTION INTRAMUSCULAR; INTRAVENOUS at 10:29

## 2025-07-21 RX ADMIN — SODIUM CHLORIDE, SODIUM LACTATE, POTASSIUM CHLORIDE, AND CALCIUM CHLORIDE: .6; .31; .03; .02 INJECTION, SOLUTION INTRAVENOUS at 08:01

## 2025-07-21 RX ADMIN — HYDROMORPHONE HYDROCHLORIDE 1 MG: 1 INJECTION, SOLUTION INTRAMUSCULAR; INTRAVENOUS; SUBCUTANEOUS at 08:49

## 2025-07-21 RX ADMIN — SODIUM CHLORIDE, SODIUM LACTATE, POTASSIUM CHLORIDE, AND CALCIUM CHLORIDE: .6; .31; .03; .02 INJECTION, SOLUTION INTRAVENOUS at 21:15

## 2025-07-21 RX ADMIN — DULOXETINE 120 MG: 60 CAPSULE, DELAYED RELEASE ORAL at 14:43

## 2025-07-21 RX ADMIN — PHENYLEPHRINE HYDROCHLORIDE 200 MCG: 10 INJECTION INTRAVENOUS at 10:29

## 2025-07-21 RX ADMIN — SUGAMMADEX 400 MG: 100 INJECTION, SOLUTION INTRAVENOUS at 11:08

## 2025-07-21 RX ADMIN — Medication 1 UNITS: at 10:33

## 2025-07-21 RX ADMIN — VANCOMYCIN HYDROCHLORIDE 1500 MG: 10 INJECTION, POWDER, LYOPHILIZED, FOR SOLUTION INTRAVENOUS at 16:18

## 2025-07-21 RX ADMIN — DEXAMETHASONE SODIUM PHOSPHATE 4 MG: 4 INJECTION, SOLUTION INTRA-ARTICULAR; INTRALESIONAL; INTRAMUSCULAR; INTRAVENOUS; SOFT TISSUE at 08:09

## 2025-07-21 RX ADMIN — PHENYLEPHRINE HYDROCHLORIDE 200 MCG: 10 INJECTION INTRAVENOUS at 10:19

## 2025-07-21 RX ADMIN — PROPOFOL 25 MCG/KG/MIN: 10 INJECTION, EMULSION INTRAVENOUS at 08:14

## 2025-07-21 RX ADMIN — ROCURONIUM BROMIDE 100 MG: 50 INJECTION, SOLUTION INTRAVENOUS at 08:09

## 2025-07-21 RX ADMIN — PROPOFOL 100 MG: 10 INJECTION, EMULSION INTRAVENOUS at 08:12

## 2025-07-21 RX ADMIN — PHENYLEPHRINE HYDROCHLORIDE 100 MCG: 10 INJECTION INTRAVENOUS at 09:22

## 2025-07-21 RX ADMIN — OLANZAPINE 15 MG: 10 TABLET, FILM COATED ORAL at 21:16

## 2025-07-21 RX ADMIN — FENTANYL CITRATE 50 MCG: 50 INJECTION, SOLUTION INTRAMUSCULAR; INTRAVENOUS at 11:48

## 2025-07-21 RX ADMIN — TRANEXAMIC ACID 1 G: 10 INJECTION, SOLUTION INTRAVENOUS at 10:20

## 2025-07-21 RX ADMIN — MIRABEGRON 50 MG: 50 TABLET, EXTENDED RELEASE ORAL at 16:18

## 2025-07-21 RX ADMIN — SODIUM CHLORIDE, SODIUM LACTATE, POTASSIUM CHLORIDE, AND CALCIUM CHLORIDE: .6; .31; .03; .02 INJECTION, SOLUTION INTRAVENOUS at 11:59

## 2025-07-21 RX ADMIN — Medication 3 G: at 08:25

## 2025-07-21 RX ADMIN — ALBUMIN HUMAN: 0.05 INJECTION, SOLUTION INTRAVENOUS at 10:00

## 2025-07-21 RX ADMIN — DEXMEDETOMIDINE HYDROCHLORIDE 0.3 MCG/KG/HR: 100 INJECTION, SOLUTION INTRAVENOUS at 08:15

## 2025-07-21 RX ADMIN — TRANEXAMIC ACID 1 G: 10 INJECTION, SOLUTION INTRAVENOUS at 08:15

## 2025-07-21 RX ADMIN — CHOLECALCIFEROL TAB 10 MCG (400 UNIT) 10 MCG: 10 TAB at 14:45

## 2025-07-21 RX ADMIN — PRAZOSIN HYDROCHLORIDE 3 MG: 1 CAPSULE ORAL at 21:15

## 2025-07-21 RX ADMIN — FAMOTIDINE 20 MG: 20 TABLET, FILM COATED ORAL at 21:15

## 2025-07-21 RX ADMIN — ACETAMINOPHEN 975 MG: 325 TABLET ORAL at 14:41

## 2025-07-21 RX ADMIN — HYDROCORTISONE: 25 CREAM TOPICAL at 21:23

## 2025-07-21 RX ADMIN — PHENYLEPHRINE HYDROCHLORIDE 200 MCG: 10 INJECTION INTRAVENOUS at 10:07

## 2025-07-21 RX ADMIN — ALBUTEROL SULFATE 2 PUFF: 108 INHALANT RESPIRATORY (INHALATION) at 09:05

## 2025-07-21 RX ADMIN — ACETAMINOPHEN 975 MG: 325 TABLET ORAL at 21:15

## 2025-07-21 RX ADMIN — ALBUMIN HUMAN: 0.05 INJECTION, SOLUTION INTRAVENOUS at 10:19

## 2025-07-21 RX ADMIN — ALBUTEROL SULFATE 6 PUFF: 108 INHALANT RESPIRATORY (INHALATION) at 08:42

## 2025-07-21 RX ADMIN — FENTANYL CITRATE 100 MCG: 50 INJECTION INTRAMUSCULAR; INTRAVENOUS at 08:09

## 2025-07-21 RX ADMIN — SODIUM CHLORIDE: 900 INJECTION INTRAVENOUS at 10:10

## 2025-07-21 RX ADMIN — MIDAZOLAM 2 MG: 1 INJECTION INTRAMUSCULAR; INTRAVENOUS at 08:02

## 2025-07-21 RX ADMIN — SENNOSIDES AND DOCUSATE SODIUM 1 TABLET: 50; 8.6 TABLET ORAL at 21:16

## 2025-07-21 RX ADMIN — LIDOCAINE HYDROCHLORIDE 50 MG: 20 INJECTION, SOLUTION INFILTRATION; PERINEURAL at 08:09

## 2025-07-21 RX ADMIN — PHENYLEPHRINE HYDROCHLORIDE 100 MCG: 10 INJECTION INTRAVENOUS at 09:36

## 2025-07-21 RX ADMIN — OXYCODONE HYDROCHLORIDE 5 MG: 5 TABLET ORAL at 14:50

## 2025-07-21 RX ADMIN — PROPOFOL 200 MG: 10 INJECTION, EMULSION INTRAVENOUS at 08:09

## 2025-07-21 RX ADMIN — ALLOPURINOL 300 MG: 300 TABLET ORAL at 14:45

## 2025-07-21 RX ADMIN — CEFTRIAXONE 2 G: 2 INJECTION, POWDER, FOR SOLUTION INTRAMUSCULAR; INTRAVENOUS at 14:18

## 2025-07-21 RX ADMIN — Medication 200 MG: at 15:03

## 2025-07-21 RX ADMIN — OXYBUTYNIN CHLORIDE 30 MG: 5 TABLET, EXTENDED RELEASE ORAL at 14:42

## 2025-07-21 RX ADMIN — PHENYLEPHRINE HYDROCHLORIDE 100 MCG: 10 INJECTION INTRAVENOUS at 09:38

## 2025-07-21 RX ADMIN — KETOROLAC TROMETHAMINE 30 MG: 30 INJECTION, SOLUTION INTRAMUSCULAR at 16:24

## 2025-07-21 ASSESSMENT — ACTIVITIES OF DAILY LIVING (ADL)
ADLS_ACUITY_SCORE: 45
ADLS_ACUITY_SCORE: 41
ADLS_ACUITY_SCORE: 41
ADLS_ACUITY_SCORE: 45
ADLS_ACUITY_SCORE: 41
ADLS_ACUITY_SCORE: 45
ADLS_ACUITY_SCORE: 41
ADLS_ACUITY_SCORE: 45
ADLS_ACUITY_SCORE: 41
ADLS_ACUITY_SCORE: 41
ADLS_ACUITY_SCORE: 45
ADLS_ACUITY_SCORE: 45
ADLS_ACUITY_SCORE: 41
ADLS_ACUITY_SCORE: 41
ADLS_ACUITY_SCORE: 45
ADLS_ACUITY_SCORE: 41
ADLS_ACUITY_SCORE: 41
ADLS_ACUITY_SCORE: 45

## 2025-07-21 ASSESSMENT — LIFESTYLE VARIABLES: TOBACCO_USE: 1

## 2025-07-21 NOTE — PROVIDER NOTIFICATION
07/20/25 9467   Tech Time   $Tech Time (10 minute increments) 4   Mode: CPAP/ BiPAP/ AVAPS/ AVAPS AE   CPAP/BiPAP/ AVAPS/ AVAPS AE Mode CPAP   Equipment   Device resmed   CPAP/BiPAP/Settings   BIPAP/CPAP On Standby On   Oxygen (%) 21   CPAP/BiPAP Patient Parameters   CPAP (cm H2O)   (autoset 5-20)   RT Device Skin Assessment   Oxygen Delivery Device CPAP/BiPAP Mask   Interface Face Mask - Medium   Ventilator Arm In Place No   Site Appearance neck circumference Clean and dry   Site Appearance bridge of nose Clean and dry   Site Appearance occiput Clean and dry   Strap Tightness Finger Allowance between head and device strap   Device Skin Interventions Taken No adjustments needed     Patient placed on CPAP.    Khalif Gilman, RT, RT  7/20/2025 11:08 PM

## 2025-07-21 NOTE — PROGRESS NOTES
[facility]    Medicine Progress Note - Hospitalist Service    Date of Admission:  7/19/2025    Assessment & Plan      Kimo Greenwood is a 44 year old male who has a history of asthma, Patel's esophagus with GERD, hypertension, depression, mild mental retardation, chronic kidney disease, gout, morbid obesity, sleep apnea, overactive bladder, iron deficiency anemia, and a right total hip arthroplasty in April 2025 coming in with an infected surgical site on that hip.  He has been dealing with increased edema, redness, and tenderness of that right hip.  Was seen at [facility] urgent care 7/11 and started on doxycycline and then later again put on Augmentin 7/16/2025.  The symptoms of gotten worse gradually.  Unfortunately cannot bear weight now.  Actually had a fall yesterday falling on his right side waking up with bilateral ankle swelling.  Patient denies any other symptoms like fever, chills, sweats.  Has no current nausea or vomiting.  He has no chest pain.  Ortho see the patient and wants to hold off on antibiotics at this point.    In the ER , the patient had a BMP showinga normal creatinine 1.15, BUN 11.2, potassium 3.9, CRP up at 130 but this is better from 161 on 7/11/25, gluconse 80, lactic acid was normal at 0.8, he does have a leukocytosis at 17.7 which is higher than 12.6 on 7/11.  His hb 10.2 with an MCV of 85 and plt of 344. Blood culture was sent.  Patient getting admitted for ongoing infected surgical hip    Problem list:    #Postoperative state with recent right MARGO back in April 2025  #Suspected right infected surgical hip  #Recent fall with ankle pain       - Patient went down for incision and drainage of the right hip with suspicion of underlying wound infection   - Will likely benefit for ID service formal evaluation and input regarding optimization of antibiotics care   - Will follow-up cultures if they are able to send samples to drain from surgery   -Creatinine remained  permitting. I increased oxycodone dosing yesterday and added as needed Toradol if still having uncontrolled pain   - PT and OT as per orthopedics instructions   - Will defer antibiotics approach to ID service   - Earlier received Ancef prior surgical approach   Continue to monitor infectious and inflammatory markers   -optimize pain control  - Will  await further recommendations from orthopedic service regarding patient's ankle pain and recent fall event    #History of mild mental retardation  -Patient mentioned that he lives independently at his own apartment with a close follow-up and visit from   -Social service following with us.  - Patient has a listed guardian    #History of bronchial asthma  -Not in exacerbation    #History of Patel's esophagus  #History of GERD  -Continued on home regimen of PPI    #History of benign essential hypertension   -Home regimen of prazosin was resumed    #History of CKD  -Appears to be at baseline and continue to monitor    Gout, hyperuricacidemia   Recently treated for gout with colchicine, steroid burst.  Should be on allopurinol so will resume.  Patient had not been taking at home    #History of depression  - On Cymbalta and Zyprexa    #History of morbidly obese  #History of LOREN  Complicates cares.  BMI of 45  Utilize appliance    #History of iron deficiency            Diet: NPO for Procedure/Surgery per Anesthesia Guidelines Except for: Meds; Clear liquids before procedure/surgery: ADULT (Age GREATER than or Equal to 18 years) - Clear liquids 2 hours before procedure/surgery    DVT Prophylaxis: Pneumatic Compression Devices  -Will benefit for chemo DVT prophylaxis.  Will await further instructions from orthopedic service once he is optimized from bleeding surgical perspective    Germain Catheter: Not present  Lines: None     Cardiac Monitoring: ACTIVE order. Indication: Procedural area  Code Status: Full Code      Clinically Significant Risk Factors               #  Hypoalbuminemia: Lowest albumin = 3.1 g/dL at 7/19/2025  2:31 PM, will monitor as appropriate  # Coagulation Defect: INR = 1.09 (Ref range: 0.85 - 1.15) and/or PTT = 42 Seconds (Ref range: 22 - 38 Seconds), will monitor for bleeding    # Hypertension: Noted on problem list              # Morbid Obesity: Estimated body mass index is 47.6 kg/m  as calculated from the following:    Height as of this encounter: 1.829 m (6').    Weight as of this encounter: 159.2 kg (350 lb 15.6 oz)., PRESENT ON ADMISSION     # Asthma: noted on problem list        Social Drivers of Health    Depression: Not at risk (7/11/2025)    PHQ-2     PHQ-2 Score: 1   Recent Concern: Depression - At risk (6/5/2025)    PHQ-2     PHQ-2 Score: 4   Tobacco Use: Medium Risk (7/21/2025)    Patient History     Smoking Tobacco Use: Former     Smokeless Tobacco Use: Former     Passive Exposure: Current          Disposition Plan     Medically Ready for Discharge: Anticipated in 2-4 Days             Shakir Jean MD, MD  Hospitalist Service  [facility]  Securely message with [employer] (more info)  Text page via [facility] Paging/Directory   ______________________________________________________________________    Interval History   Continuing medicine service care.   Kimo was still having intermittent uncontrolled pain.  Demonstrated stable hemodynamics.  Afebrile.    Underwent operative intervention care of orthopedic service earlier with planned incision and drainage of the right hip       Physical Exam   Vital Signs: Temp: 97.5  F (36.4  C) Temp src: Temporal BP: 98/53 Pulse: 88   Resp: 17 SpO2: 99 % O2 Device: Simple face mask Oxygen Delivery: 7 LPM  Weight: 350 lbs 15.56 oz    HEENT; Atraumatic, normocephalic, pinkish conjuctiva, pupils bilateral reactive   Obese  Skin: warm and moist, no rashes  Lymphatics: no cervical or axillary lymphandenopathy  Lungs: equal chest expansion, clear to auscultation, no wheezes, no stridor, no  crackles,   Heart: normal rate, normal rhythm, no rubs or gallops.   Abdomen: normal bowel sounds, no tenderness, no peritoneal signs, no guarding  Extremities: no deformities, bilateral lower extremity swelling right hip with  Presence of erythema  Neuro; follow commands, alert and oriented x3, spontaneous speech, coherent, moves all extremities spontaneously  Psych; no hallucination, euthymic mood, not agitated      Medical Decision Making       50 MINUTES SPENT BY ME on the date of service doing chart review, history, exam, documentation & further activities per the note.  MANAGEMENT DISCUSSED with the following over the past 24 hours: Yes   NOTE(S)/MEDICAL RECORDS REVIEWED over the past 24 hours: Yes      Data     I have personally reviewed the following data over the past 24 hrs:    INR:  1.09 PTT:  42 (H)   D-dimer:  N/A Fibrinogen:  N/A       Imaging results reviewed over the past 24 hrs:   No results found for this or any previous visit (from the past 24 hours).

## 2025-07-21 NOTE — ANESTHESIA CARE TRANSFER NOTE
Patient: Kimo Greenwood    Procedure: Procedure(s):  Incision and drainage hip, combined, with revision of head and acetabular liner       Diagnosis: Postoperative wound infection of right hip [T81.49XA]  Diagnosis Additional Information: No value filed.    Anesthesia Type:   General     Note:    Oropharynx: oropharynx clear of all foreign objects  Level of Consciousness: awake  Oxygen Supplementation: face mask  Level of Supplemental Oxygen (L/min / FiO2): 6  Independent Airway: airway patency satisfactory and stable  Dentition: dentition unchanged  Vital Signs Stable: post-procedure vital signs reviewed and stable  Report to RN Given: handoff report given  Patient transferred to: PACU    Handoff Report: Identifed the Patient, Identified the Reponsible Provider, Reviewed the pertinent medical history, Discussed the surgical course, Reviewed Intra-OP anesthesia mangement and issues during anesthesia, Set expectations for post-procedure period and Allowed opportunity for questions and acknowledgement of understanding      Vitals:  Vitals Value Taken Time   /59 07/21/25 11:19   Temp 97.4  F (36.3  C) 07/21/25 11:19   Pulse 90 07/21/25 11:20   Resp 21 07/21/25 11:20   SpO2 95 % 07/21/25 11:20   Vitals shown include unfiled device data.    Electronically Signed By: LAMBERT Bustamante CRNA  July 21, 2025  11:21 AM

## 2025-07-21 NOTE — ANESTHESIA PROCEDURE NOTES
Airway       Patient location during procedure: OR       Procedure Start/Stop Times: 7/21/2025 8:12 AM  Staff -        CRNA: Benji Haider APRN CRNA       Performed By: CRNA  Consent for Airway        Urgency: elective  Indications and Patient Condition       Indications for airway management: cisco-procedural       Induction type:intravenous       Mask difficulty assessment: 1 - vent by mask    Final Airway Details       Final airway type: endotracheal airway       Successful airway: ETT - single  Endotracheal Airway Details        ETT size (mm): 8.0       Cuffed: yes       Successful intubation technique: video laryngoscopy       VL Blade Size: Glidescope 4       Grade View of Cords: 2       Adjucts: stylet       Position: Right       Measured from: lips       Secured at (cm): 24       Bite block used: None    Post intubation assessment        Placement verified by: capnometry, equal breath sounds and chest rise        Number of attempts at approach: 1       Secured with: tape       Ease of procedure: easy       Dentition: Intact and Unchanged    Medication(s) Administered   Medication Administration Time: 7/21/2025 8:12 AM

## 2025-07-21 NOTE — ANESTHESIA POSTPROCEDURE EVALUATION
Patient: Kimo Greenwood    Procedure: Procedure(s):  Incision and drainage hip, combined, with revision of head and acetabular liner       Anesthesia Type:  General    Note:  Disposition: Inpatient   Postop Pain Control:    PONV: No   Neuro/Psych: Uneventful            Sign Out: Acceptable/Baseline neuro status   Airway/Respiratory: Uneventful            Sign Out: Acceptable/Baseline resp. status   CV/Hemodynamics: Uneventful            Sign Out: Acceptable CV status; No obvious hypovolemia; No obvious fluid overload   Other NRE:    DID A NON-ROUTINE EVENT OCCUR? No           Last vitals:  Vitals Value Taken Time   /61 07/21/25 12:30   Temp 97.5  F (36.4  C) 07/21/25 12:45   Pulse 83 07/21/25 12:45   Resp 10 07/21/25 12:45   SpO2 95 % 07/21/25 12:45   Vitals shown include unfiled device data.    Electronically Signed By: Asha Malik MD  July 21, 2025  12:50 PM

## 2025-07-21 NOTE — ANESTHESIA PREPROCEDURE EVALUATION
Anesthesia Pre-Procedure Evaluation    Patient: Kimo Greenwood   MRN: [id number] : 1980          Procedure : Procedure(s):  Incision and drainage hip, combined         Past Medical History:   Diagnosis Date    Arthritis     DDD hips and knees    Asthma     Asthma     Patel's esophagus     Chronic pain     Chronic pain - left hip/leg pain     degenerative disc disease, back, hips, knees    Gastroesophageal reflux disease     History of anesthesia complications     agitation x1 after interstim     Hypertension     Hypertension     Leukocytosis     LPRD (laryngopharyngeal reflux disease)     Marijuana abuse     Marijuana abuse     MDD (major depressive disorder)     MDD (major depressive disorder)     Mental retardation, mild (I.Q. 50-70)     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) 2010    With associated speech/language delay    Obesity 2010    LOREN (obstructive sleep apnea)     Uses a CPAP    LOREN (obstructive sleep apnea)     Seborrheic dermatitis     Seborrheic dermatitis     Sleep apnea     CPAP      Past Surgical History:   Procedure Laterality Date    ARTHROPLASTY HIP Left 2017    Procedure: ARTHROPLASTY HIP;  Surgeon: Bala Randall MD;  Location: [facility] OR    ARTHROPLASTY HIP Left     ARTHROPLASTY HIP Right 2025    Procedure: Right total hip arthroplasty;  Surgeon: Bala Randall MD;  Location: [facility] OR    ARTHROPLASTY REVISION HIP Left 2019    Procedure: Revision left total hip arthroplasty with a head and liner exchange to a Biomet dual mobility head and liner.;  Surgeon: Bala Randall MD;  Location:  OR    BACK SURGERY      BLADDER SURGERY      Fred, Alida,Interstem stimulator implant    CLOSED REDUCTION HIP Left 06/10/2019    Procedure: Closed reduction under general anesthesia left recurrent prosthetic hip dislocation;  Surgeon: Rafat Cazares MD;  Location:  OR    COLONOSCOPY N/A 10/30/2015    Procedure: COMBINED  COLONOSCOPY, SINGLE OR MULTIPLE BIOPSY/POLYPECTOMY BY BIOPSY;  Surgeon: Alexis Jackson MD;  Location: [facility] GI    COLONOSCOPY N/A 11/17/2016    Procedure: COMBINED COLONOSCOPY, SINGLE OR MULTIPLE BIOPSY/POLYPECTOMY BY BIOPSY;  Surgeon: Cat Huber MD;  Location: [facility]    COLONOSCOPY N/A 10/28/2020    Procedure: COLONOSCOPY;  Surgeon: You Kraus MD;  Location: [facility] OR    COLONOSCOPY      ESOPHAGOSCOPY, GASTROSCOPY, DUODENOSCOPY (EGD), COMBINED N/A 11/17/2016    Procedure: COMBINED ESOPHAGOSCOPY, GASTROSCOPY, DUODENOSCOPY (EGD), BIOPSY SINGLE OR MULTIPLE;  Surgeon: Cat Huber MD;  Location: [facility]    ESOPHAGOSCOPY, GASTROSCOPY, DUODENOSCOPY (EGD), COMBINED N/A 03/12/2020    Procedure: ESOPHAGOGASTRODUODENOSCOPY WITH BIOPSIES;  Surgeon: You Kraus MD;  Location: [facility] OR    ESOPHAGOSCOPY, GASTROSCOPY, DUODENOSCOPY (EGD), COMBINED N/A 10/28/2020    Procedure: ESOPHAGOGASTRODUODENOSCOPY, WITH BIOPSY;  Surgeon: You Kraus MD;  Location: [facility] OR    ESOPHAGOSCOPY, GASTROSCOPY, DUODENOSCOPY (EGD), COMBINED      ESOPHAGOSCOPY, GASTROSCOPY, DUODENOSCOPY (EGD), COMBINED N/A 11/29/2023    Procedure: Esophagoscopy, gastroscopy, duodenoscopy (EGD), combined with biopsies using forceps;  Surgeon: You Kraus MD;  Location: [facility] GI    EXAM UNDER ANESTHESIA, FULGURATE CONDYLOMA ANUS, COMBINED N/A 12/22/2016    Procedure: COMBINED EXAM UNDER ANESTHESIA, FULGURATE CONDYLOMA ANUS;  Surgeon: Nya Cabello MD;  Location: [facility] OR    GENITOURINARY SURGERY      JOINT REPLACEMENT Left 01/01/2017    MARGO, Revision Left MARGO    OTHER SURGICAL HISTORY      interstim stimulator implant    VA CYSTOURETHROSCOPY INJ CHEMODENERVATION BLADDER N/A 01/16/2019    Procedure: CYSTOSCOPY BOTOX BLADDER INJECTIONS (100 UNITS IN 10CC);  Surgeon: Didier Ibarra MD;  Location: [facility];  Service: Urology    VA IMPLANT PERIPH/GASTRIC NEUROSTIM/ N/A 01/08/2020    Procedure: NEW  INTERSTIM LEAD, REPLACE OLD; NEW INTERSTIM BATTERY, REPLACE OLD;  Surgeon: Didier Ibarra MD;  Location: [facility] OR;  Service: Urology      Allergies   Allergen Reactions    Other [No Clinical Screening - See Comments] Other (See Comments)     Awoke from anesthesia with anger and ripping off dressing, after interstim placement, outside hospital       Social History     Tobacco Use    Smoking status: Former     Current packs/day: 0.00     Average packs/day: 1 pack/day for 1 year (1.0 ttl pk-yrs)     Types: Cigarettes, Other     Quit date: 2025     Years since quittin.3     Passive exposure: Current    Smokeless tobacco: Former     Types: Chew     Quit date: 2025    Tobacco comments:     Switched to an ecig with 0% nicotine. Current Marijuana Used.    Substance Use Topics    Alcohol use: Yes     Comment: socially      Wt Readings from Last 1 Encounters:   25 (!) 159.2 kg (350 lb 15.6 oz)        Anesthesia Evaluation   Pt has had prior anesthetic. Type: General.    History of anesthetic complications   Agitation.    ROS/MED HX  ENT/Pulmonary:     (+) sleep apnea,               tobacco use, Past use,     asthma                  Neurologic:       Cardiovascular:     (+)  hypertension- -   -  - -                                 Previous cardiac testing   Echo: Date:  Results:  Interpretation Summary     Left ventricular size, wall motion and function are normal. The ejection  fraction is 60-65%.  Normal right ventricle size and systolic function.  There is no pericardial effusion.  Fat pad noted.  No hemodynamically significant valvular abnormalities on 2D or color flow  imaging. The study was technically difficult.    Stress Test:  Date: Results:    ECG Reviewed:  Date: Results:    Cath:  Date: Results:      METS/Exercise Tolerance:     Hematologic:     (+)      anemia,          Musculoskeletal: Comment: S/p R MARGO 2025, presented with infection      GI/Hepatic:     (+) GERD,         "           Renal/Genitourinary: Comment: Bladder stimulator    (+) renal disease, type: CRI,            Endo:     (+)               Obesity,       Psychiatric/Substance Use: Comment: Mild mental retardation, lives alone, mother is guardian    (+) psychiatric history depression       Infectious Disease:       Malignancy:       Other:              Physical Exam  Airway  Mallampati: III  TM distance: >3 FB  Neck ROM: full  Mouth opening: >= 4 cm    Cardiovascular - normal exam   Dental   (+) Minor Abnormalities - some fillings, tiny chips      Pulmonary - normal exam      Neurological   Other Findings       OUTSIDE LABS:  CBC:   Lab Results   Component Value Date    WBC 15.1 (H) 07/20/2025    WBC 17.7 (H) 07/19/2025    HGB 9.7 (L) 07/20/2025    HGB 10.2 (L) 07/19/2025    HCT 29.4 (L) 07/20/2025    HCT 31.0 (L) 07/19/2025     07/20/2025     07/19/2025     BMP:   Lab Results   Component Value Date     07/20/2025     07/19/2025    POTASSIUM 3.9 07/20/2025    POTASSIUM 3.9 07/19/2025    CHLORIDE 104 07/20/2025    CHLORIDE 103 07/19/2025    CO2 24 07/20/2025    CO2 26 07/19/2025    BUN 12.2 07/20/2025    BUN 11.2 07/19/2025    CR 1.06 07/20/2025    CR 1.15 07/19/2025    GLC 72 07/21/2025     (H) 07/20/2025     COAGS:   Lab Results   Component Value Date    PTT 42 (H) 07/20/2025    INR 1.09 07/20/2025     POC: No results found for: \"BGM\", \"HCG\", \"HCGS\"  HEPATIC:   Lab Results   Component Value Date    ALBUMIN 3.1 (L) 07/19/2025    PROTTOTAL 6.8 07/19/2025    ALT 21 07/19/2025    AST 15 07/19/2025    ALKPHOS 67 07/19/2025    BILITOTAL 0.3 07/19/2025     OTHER:   Lab Results   Component Value Date    LACT 0.8 07/19/2025    A1C 5.4 08/08/2023    FILEMON 9.1 07/20/2025    MAG 2.0 04/30/2025    LIPASE 99 04/18/2022    TSH 2.84 06/27/2023    T4 1.38 04/10/2012    CRP 14.0 (H) 06/18/2019    SED 76 (H) 07/19/2025       Anesthesia Plan    ASA Status:  3      NPO Status: NPO Appropriate   Anesthesia Type: " General.  Airway: oral.  Induction: intravenous.  Maintenance: Balanced.   Techniques and Equipment:     - Airway:  Planned airway equipment includes video laryngoscope.     - Monitoring Plan: standard ASA monitoring     Consents    Anesthesia Plan(s) and associated risks, benefits, and realistic alternatives discussed. Questions answered and patient/representative(s) expressed understanding.     - Discussed: anesthesiologist     - Discussed with:  Patient, legal guardian, family        - Pt is DNR/DNI Status: no DNR     Blood Consent:      - Discussed with: not discussed.     Postoperative Care    Pain management: plan for postoperative opioid use, multimodal analgesia.     Comments:    Other Comments: Bladder stim off during procedure               Asha Malik MD    I have reviewed the pertinent notes and labs in the chart from the past 30 days and (re)examined the patient.  Any updates or changes from those notes are reflected in this note.    Clinically Significant Risk Factors               # Hypoalbuminemia: Lowest albumin = 3.1 g/dL at 7/19/2025  2:31 PM, will monitor as appropriate  # Coagulation Defect: INR = 1.09 (Ref range: 0.85 - 1.15) and/or PTT = 42 Seconds (Ref range: 22 - 38 Seconds), will monitor for bleeding    # Hypertension: Noted on problem list            # Morbid Obesity: Estimated body mass index is 47.6 kg/m  as calculated from the following:    Height as of this encounter: 1.829 m (6').    Weight as of this encounter: 159.2 kg (350 lb 15.6 oz)., PRESENT ON ADMISSION     # Asthma: noted on problem list

## 2025-07-21 NOTE — PLAN OF CARE
"Goal Outcome Evaluation:       Patient vital signs are at baseline: Yes, RA. Using CPAP.   Patient able to ambulate as they were prior to admission or with assist devices provided by therapies during their stay:  No,  Reason:  Patient not out of bed this shift. Sleeping most of the night.  Patient MUST void prior to discharge:  Yes  Patient able to tolerate oral intake:  No,  Reason:   NPO  Pain has adequate pain control using Oral analgesics:  Yes  Does patient have an identified :  Yes  Has goal D/C date and time been discussed with patient:  Yes   I & D scheduled tomorrow at 11 a.m.     Problem: Adult Inpatient Plan of Care  Goal: Plan of Care Review  Description: The Plan of Care Review/Shift note should be completed every shift.  The Outcome Evaluation is a brief statement about your assessment that the patient is improving, declining, or no change.  This information will be displayed automatically on your shift  note.  Outcome: Progressing  Goal: Patient-Specific Goal (Individualized)  Description: You can add care plan individualizations to a care plan. Examples of Individualization might be:  \"Parent requests to be called daily at 9am for status\", \"I have a hard time hearing out of my right ear\", or \"Do not touch me to wake me up as it startles  me\".  Outcome: Progressing  Goal: Absence of Hospital-Acquired Illness or Injury  Outcome: Progressing  Intervention: Identify and Manage Fall Risk  Recent Flowsheet Documentation  Taken 7/21/2025 0101 by Robin Rubio, RN  Safety Promotion/Fall Prevention: safety round/check completed  Taken 7/21/2025 0015 by Robin Rubio, RN  Safety Promotion/Fall Prevention: safety round/check completed  Intervention: Prevent Skin Injury  Recent Flowsheet Documentation  Taken 7/21/2025 0101 by Robin Rubio, RN  Body Position: weight shifting  Intervention: Prevent and Manage VTE (Venous Thromboembolism) Risk  Recent Flowsheet Documentation  Taken 7/21/2025 0101 by Ramiro" Robin GALLARDO RN  VTE Prevention/Management: SCDs on (sequential compression devices)  Intervention: Prevent Infection  Recent Flowsheet Documentation  Taken 7/21/2025 0101 by Robin Rubio RN  Infection Prevention:   hand hygiene promoted   rest/sleep promoted   single patient room provided  Taken 7/21/2025 0015 by Robin Rubio RN  Infection Prevention:   hand hygiene promoted   rest/sleep promoted   single patient room provided  Goal: Optimal Comfort and Wellbeing  Outcome: Progressing  Goal: Readiness for Transition of Care  Outcome: Progressing     Problem: Infection  Goal: Absence of Infection Signs and Symptoms  Outcome: Progressing     Problem: Pain Acute  Goal: Optimal Pain Control and Function  Outcome: Progressing  Intervention: Prevent or Manage Pain  Recent Flowsheet Documentation  Taken 7/21/2025 0101 by Robin Rubio RN  Sleep/Rest Enhancement: comfort measures  Bowel Elimination Promotion: adequate fluid intake promoted  Medication Review/Management: medications reviewed  Taken 7/21/2025 0015 by Robin Rubio, RN  Medication Review/Management: medications reviewed

## 2025-07-21 NOTE — PLAN OF CARE
"Immediate post op. Belongings in room 625. Alert, oriented x4. Resting between cares. Sleeping between cares rates pain 8/10, snoring intermittently. Reviewed new orders, pain scale, and IS.   [place] drain. Dressing is dry and intact except one pencil eraser size drainage.   ID- new orders vanco and rocephin.   Severe edema to bilateral legs/feet. No numbness.     Patient vital signs are at baseline: No,  Reason:  Oxygen NC 2 L. Capno. Pulse ox.   Patient able to ambulate as they were prior to admission or with assist devices provided by therapies during their stay:  No,  Reason:  stood at bedside and took a few steps. 2 assist, GB, walker.   Patient MUST void prior to discharge:  Yes  Patient able to tolerate oral intake:  Yes  Pain has adequate pain control using Oral analgesics:  Yes. Reviwed pain scale. Rates pain 8/10 but able to rest between cares.   Does patient have an identified :  Yes. Guardians. Dad here this afternoon.   Has goal D/C date and time been discussed with patient:  No,  Reason:  therapy pending and cultures.   Bladder stimulator remains \"off\" per patient as he noted his sister has the sensor to turn it back on.     Following plan of care. IV fluids. Antibiotics. Pain meds. Ate well.    Problem: Adult Inpatient Plan of Care  Goal: Plan of Care Review  Description: The Plan of Care Review/Shift note should be completed every shift.  The Outcome Evaluation is a brief statement about your assessment that the patient is improving, declining, or no change.  This information will be displayed automatically on your shift  note.  Outcome: Not Progressing  Flowsheets (Taken 7/21/2025 1842)  Plan of Care Reviewed With: patient  Overall Patient Progress: no change  Goal: Patient-Specific Goal (Individualized)  Description: You can add care plan individualizations to a care plan. Examples of Individualization might be:  \"Parent requests to be called daily at 9am for status\", \"I have a hard time hearing " "out of my right ear\", or \"Do not touch me to wake me up as it startles  me\".  Outcome: Not Progressing  Goal: Absence of Hospital-Acquired Illness or Injury  Outcome: Not Progressing  Intervention: Prevent Skin Injury  Recent Flowsheet Documentation  Taken 7/21/2025 1328 by Erin Encarnacion, RN  Body Position: supine, head elevated  Goal: Optimal Comfort and Wellbeing  Outcome: Not Progressing  Intervention: Monitor Pain and Promote Comfort  Recent Flowsheet Documentation  Taken 7/21/2025 1451 by Erin Encarnacion, RN  Pain Management Interventions: medication (see MAR)  Goal: Readiness for Transition of Care  Outcome: Not Progressing     Problem: Infection  Goal: Absence of Infection Signs and Symptoms  Outcome: Not Progressing     Problem: Pain Acute  Goal: Optimal Pain Control and Function  Outcome: Not Progressing  Intervention: Develop Pain Management Plan  Recent Flowsheet Documentation  Taken 7/21/2025 1451 by Erin Encarnacion, RN  Pain Management Interventions: medication (see MAR)   Goal Outcome Evaluation:      Plan of Care Reviewed With: patient    Overall Patient Progress: no changeOverall Patient Progress: no change               "

## 2025-07-21 NOTE — OP NOTE
[facility]  OPERATIVE NOTE    Pre-Op Diagnosis:  Postoperative wound infection of right hip [T81.49XA]  Post-Op Diagnosis: Same     Procedure(s):  Incision and drainage hip, combined, with revision of head and acetabular liner    Surgeon: Bala Randall MD    Assistant: Judy Barragan PA-C - Assisting    Anesthesia Type: Choice    Estimated Blood Loss: 1500 cc    Specimen(s): None  Complications: None  Findings: As expected    Implant(s):   Implant Name Type Inv. Item Serial No.  Lot No. LRB No. Used Action   GRAFT BONE BEAD OSTEOSET MINI 5ML [id number] - [id number] Bone/Tissue Synthetic GRAFT BONE BEAD OSTEOSET MINI 5ML [id number]  [id number] Right 1 Implanted   LINER ACTB G7 F 44MM COCR HIP 2 MBL - [id number] Total Joint Component/Insert LINER ACTB G7 F 44MM COCR HIP 2 MBL  [employer] [id number] Right 1 Implanted   BEARING HIP 44MM 28MM F VIVACIT-E LUM STRL LF - [id number] Total Joint Component/Insert BEARING HIP 44MM 28MM F VIVACIT-E LUM STRL LF  [employer] [id number] Right 1 Implanted   IMP SLEEVE BIOM TYPE1 TPR FOR BIOLOX DELTA +3MM 650-1067 - [id number] Total Joint Component/Insert IMP SLEEVE BIOM TYPE1 TPR FOR BIOLOX DELTA +3MM 650-1067  [employer] [id number] Right 1 Implanted   IMP HEAD FEM BIOM BIOLOX DELTA OPTION 28MM 650-1055 - [id number] Total Joint Component/Insert IMP HEAD FEM BIOM BIOLOX DELTA OPTION 28MM 650-1055  [employer] [id number] Right 1 Implanted   HEAD FEM +3MM OFST 28MM HIP MDLR TY 1 BLX D G7 650-1157 - [id number] Total Joint Component/Insert HEAD FEM +3MM OFST 28MM HIP MDLR TY 1 BLX D G7 650-1157  [employer] [id number] Right 1 Explanted   LINER ACTB G7 F 44MM COCR HIP 2 MBL - [id number] Total Joint Component/Insert LINER ACTB G7 F 44MM COCR HIP 2 MBL  [employer] [id number] Right 1 Explanted       Indication for Procedure:   The patient is a 44 year old male with history of a right total hip arthroplasty in April 2025.  Following this he did  well but did have some wound healing issues.  The wound eventually healed without complication, and the patient had been proceeding with his recovery.  About 10 days ago he fell and began developing right hip pain and swelling.  He was seen in urgent care and ER visits on multiple occasions and eventually was placed on oral antibiotics.  With continued pain and swelling presented to the emergency room 2 days ago and was admitted for possibly infected right hip wound.  I discussed the options for treatment with the patient and his mother and recommended open irrigation debridement with obtaining cultures.  We would exchange out the head liner of his total hip arthroplasty.  Packed the wound with antibiotics after irrigation.  We would await the culture results and likely treat with IV antibiotics after this discussion he wanted proceed with surgery.      Procedure Summary Narrative:  Kimo was taken to the operating room and placed on the operating table in the supine position.  After adequate induction of a spinal anesthetic he was transferred to the lateral position and held this way with the Andrew hip positioner. An axillary roll was placed, and care was taken to pad all bony prominences. He was given 2 g of Ancef for infection prophylaxis 30 min prior to incision. We then performed a sterile prep and drape of the right hip and lower extremity.  After sterile prep and drape an incision was made through his previous incision and ellipsing the tenuous skin on the proximal end of the wound.  We incised through the skin and subcutaneous tissue and then encountered a large subcutaneous seroma containing some purulent fluid about 500 cc in size.  This was evacuated and the area was debrided using rongeur and curette.  We took cultures of the fluid as well as 2 cultures of the tissue in that region.  We then proceeded with the anterior approach to the hip.  We identified the tensor fascia which was split through its  previous repair.  I then took down the anterior one third of the abductors and tagged and retracted medially for lateral repair and then did a capsulectomy and sent 2 samples of tissue for culture.  We then were able to dislocate the prostatic femoral head and exposed the acetabulum.  We evaluated the femoral stem which was well-fixed into position.  After exposing the acetabulum, we removed the liner for the dual mobility component from the acetabular component.  We then removed both screws from the acetabular liner and the cup was still well-fixed.  At this point we then began to irrigate the hip wound and implant surfaces.  We began irrigating with 1 L of saline, followed this with 2 L of bactisure via pulse lavage, followed by 1 L of saline, followed by 450 cc of Irrisept solution, followed by 1 L of saline, followed by dilute Betadine wash, followed by another liter of saline.  At this point we felt that we had thoroughly cleaned the implants and the wound bed.  We placed a new acetabular liner to fit a size 54 acetabular component with an inner diameter to fit a 44 dual mobility component.  We replaced the femoral head with a 28 mm +3 ceramic femoral head with a 44 outer diameter bipolar dual mobility component.  Once these were solidly in the position the hip was reduced.  We closed the joint capsule and hip abductor with interrupted #1 PDS sutures.  We closed the tensor fascia with #1 PDS sutures in an old looped running PDS sutures.  We placed 1 gram of powered vancomycin and 1 g of powdered tobramycin deep to the fascia.  We also placed 5 cc of calcium sulfate Stimulan beads that we had prepared on the back table prior to surgery that were impregnated with 1 g of vancomycin deep to the tensor fascia as well.  We then closed the soft tissue with absorbable sutures and nylon sutures in the skin.  Sterile humera dressing was then applied.  He tolerated the procedure there were no intraoperative complications.   Patient was sent to the PAR in stable condition.      Plan will be for the patient get Ancef for infection prophylaxis and we will follow the cultures and adjust antibiotics accordingly.  We have consulted infectious disease to assist with further antibiotic management.  The patient will get 40 mg of Lovenox subcu daily until discharge and then 30 days of aspirin for DVT prophylaxis.      Should be noted that my assistant was vital throughout the entire procedure for the safe transfer of the patient from the hospital bed to the operating table and back to the hospital bed is also used for retraction and closure of the wounds.     Bala Randall MD  [facility]

## 2025-07-21 NOTE — PLAN OF CARE
"Goal Outcome Evaluation:      Plan of Care Reviewed With: patient    Overall Patient Progress: no changeOverall Patient Progress: no change     A&Ox4. VSS on room air. Continues to rate pain 8-10. MD notified. PRN doses increased. Scheduled tylenol as well as prn atarax, oxycodone, and IV toradol given. Pt appears calm and comfortable. Denies nausea. Dressing to R hip changed. Swelling and redness still noted, producing moderate drainage. IV SL. Tolerating regular diet, good appetite this shift. Will be NPO at midnight. Voiding with urinal. Encouraged activity OOB. Pt refusing at this time due to pain in feet. Repositions self in bed. Surgical bath done. Plan is for surgery tomorrow at 1100.    Problem: Adult Inpatient Plan of Care  Goal: Plan of Care Review  Description: The Plan of Care Review/Shift note should be completed every shift.  The Outcome Evaluation is a brief statement about your assessment that the patient is improving, declining, or no change.  This information will be displayed automatically on your shift  note.  Outcome: Progressing  Flowsheets (Taken 7/20/2025 2036)  Plan of Care Reviewed With: patient  Overall Patient Progress: no change  Goal: Patient-Specific Goal (Individualized)  Description: You can add care plan individualizations to a care plan. Examples of Individualization might be:  \"Parent requests to be called daily at 9am for status\", \"I have a hard time hearing out of my right ear\", or \"Do not touch me to wake me up as it startles  me\".  Outcome: Progressing  Goal: Absence of Hospital-Acquired Illness or Injury  Outcome: Progressing  Intervention: Identify and Manage Fall Risk  Recent Flowsheet Documentation  Taken 7/20/2025 1634 by Ana Martinez, RN  Safety Promotion/Fall Prevention: safety round/check completed  Intervention: Prevent Skin Injury  Recent Flowsheet Documentation  Taken 7/20/2025 1900 by Ana Martinez, RN  Body Position: position changed independently  Taken " 7/20/2025 1634 by Ana Martinez RN  Body Position:   turned   left   position changed independently  Skin Protection: adhesive use limited  Taken 7/20/2025 1625 by Ana Martinez RN  Body Position:   supine, head elevated   position changed independently  Intervention: Prevent and Manage VTE (Venous Thromboembolism) Risk  Recent Flowsheet Documentation  Taken 7/20/2025 1634 by Ana Martinez RN  VTE Prevention/Management: SCDs on (sequential compression devices)  Intervention: Prevent Infection  Recent Flowsheet Documentation  Taken 7/20/2025 1634 by Ana Martinez RN  Infection Prevention:   hand hygiene promoted   rest/sleep promoted   single patient room provided  Goal: Optimal Comfort and Wellbeing  Outcome: Progressing  Intervention: Monitor Pain and Promote Comfort  Recent Flowsheet Documentation  Taken 7/20/2025 1900 by Ana Martinez RN  Pain Management Interventions:   medication (see MAR)   repositioned  Taken 7/20/2025 1824 by Ana Martinez RN  Pain Management Interventions: medication (see MAR)  Taken 7/20/2025 1738 by Ana Martinez RN  Pain Management Interventions: medication (see MAR)  Taken 7/20/2025 1712 by Ana Martinez RN  Pain Management Interventions: rest  Taken 7/20/2025 1625 by Ana Martinez RN  Pain Management Interventions:   medication (see MAR)   rest   repositioned  Goal: Readiness for Transition of Care  Outcome: Progressing     Problem: Infection  Goal: Absence of Infection Signs and Symptoms  Outcome: Progressing     Problem: Pain Acute  Goal: Optimal Pain Control and Function  Outcome: Progressing  Intervention: Develop Pain Management Plan  Recent Flowsheet Documentation  Taken 7/20/2025 1900 by Ana Martinez RN  Pain Management Interventions:   medication (see MAR)   repositioned  Taken 7/20/2025 1824 by Ana Martinez RN  Pain Management Interventions: medication (see MAR)  Taken 7/20/2025 1738 by Ana Martinez RN  Pain Management Interventions:  medication (see MAR)  Taken 7/20/2025 1712 by Ana Martinez, RN  Pain Management Interventions: rest  Taken 7/20/2025 1625 by Ana Martinez, RN  Pain Management Interventions:   medication (see MAR)   rest   repositioned  Intervention: Prevent or Manage Pain  Recent Flowsheet Documentation  Taken 7/20/2025 1634 by Ana Martinez, RN  Bowel Elimination Promotion: adequate fluid intake promoted  Medication Review/Management: medications reviewed

## 2025-07-21 NOTE — PHARMACY-VANCOMYCIN DOSING SERVICE
Pharmacy Vancomycin Initial Note  Date of Service 2025  Patient's  1980  44 year old, male    Indication: Bone and Joint Infection    Current estimated CrCl = Estimated Creatinine Clearance: 140.1 mL/min (based on SCr of 1.06 mg/dL).    Creatinine for last 3 days  2025:  2:31 PM Creatinine 1.15 mg/dL  2025:  7:21 AM Creatinine 1.06 mg/dL    Recent Vancomycin Level(s) for last 3 days  No results found for requested labs within last 3 days.      Vancomycin IV Administrations (past 72 hours)        No vancomycin orders with administrations in past 72 hours.                    Nephrotoxins and other renal medications (From now, onward)      Start     Dose/Rate Route Frequency Ordered Stop    25 1400  vancomycin (VANCOCIN) 1,500 mg in 0.9% NaCl 250 mL intermittent infusion         1,500 mg  over 90 Minutes Intravenous EVERY 12 HOURS 25 1354      25 1724  ketorolac (TORADOL) injection 30 mg         30 mg Intravenous EVERY 6 HOURS PRN 25 1724 25 1723            Contrast Orders - past 72 hours (72h ago, onward)      Start     Dose/Rate Route Frequency Stop    25 1510  iopamidol (ISOVUE-370) solution 100 mL         100 mL Intravenous ONCE 25 1511            [employer] Prediction of Planned Initial Vancomycin Regimen  Regimen: 1500 mg IV every 12 hours.  Start time: 14:00 on 2025  Exposure target: AUC24 (range) 400-600 mg/L.hr   AUC24,ss: 578 mg/L.hr  Probability of AUC24 > 400: 76 %  Ctrough,ss: 15.2 mg/L  Probability of Ctrough,ss > 20: 37 %  Probability of nephrotoxicity (Lodise SANDRA ): 10 %          Plan:  Start vancomycin 1500 mg IV q12h.   Vancomycin monitoring method: AUC  Vancomycin therapeutic monitoring goal: 400-600 mg*h/L  Pharmacy will check vancomycin levels as appropriate in 1-3 Days.    Serum creatinine levels will be ordered daily for the first week of therapy and at least twice weekly for subsequent weeks.      Krystal Tavarez,  PharmD  July 21, 2025

## 2025-07-21 NOTE — CONSULTS
[facility]    Infectious Disease Consultation     Date of Admission:  7/19/2025  Date of Consult (When I saw the patient): 07/21/25    Assessment & Plan   Kimo Greenwood is a 44 year old male who was admitted on 7/19/2025.     Impression:  43 yo male with history of a right total hip arthroplasty in April 2025.    Approx 10 days ago he fell and began developing right hip pain and swelling.    He was seen in urgent care and ER visits on multiple occasions and eventually was placed on oral antibiotics.    With continued pain and swelling presented to the emergency room 2 days ago and was admitted for possibly infected right hip wound.    S/p I and D and combined, with revision of head and acetabular liner     Recommendations   IV vanco + ceftriaxone   Follow up on the OR culture   Anticipate need for IV antibiotics for 6 weeks followed by oral antibiotics for 3 months based on culture     Harley Drake MD    Reason for Consult   Reason for consult: I was asked to evaluate this patient for hip infection.    Primary Care Physician   Louise Villalobos    Chief Complaint   Hip pain   Fall   Recent MARGO     History is obtained from the patient and medical records    History of Present Illness   Kimo Greenwood is a 44 year old male who presents with hip pain after a fall recent MARGO history     Past Medical History   I have reviewed this patient's medical history and updated it with pertinent information if needed.   Past Medical History:   Diagnosis Date    Arthritis     DDD hips and knees    Asthma     Asthma     Patel's esophagus     Chronic pain     Chronic pain - left hip/leg pain     degenerative disc disease, back, hips, knees    Gastroesophageal reflux disease     History of anesthesia complications     agitation x1 after interstim 2013    Hypertension     Hypertension     Leukocytosis     LPRD (laryngopharyngeal reflux disease)     Marijuana abuse     Marijuana abuse     MDD (major depressive  disorder)     MDD (major depressive disorder)     Mental retardation, mild (I.Q. 50-70)     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    With associated speech/language delay    Obesity 11/11/2010    LOREN (obstructive sleep apnea)     Uses a CPAP    LOREN (obstructive sleep apnea)     Seborrheic dermatitis     Seborrheic dermatitis     Sleep apnea     CPAP       Past Surgical History   I have reviewed this patient's surgical history and updated it with pertinent information if needed.  Past Surgical History:   Procedure Laterality Date    ARTHROPLASTY HIP Left 01/25/2017    Procedure: ARTHROPLASTY HIP;  Surgeon: Bala Randall MD;  Location: [facility] OR    ARTHROPLASTY HIP Left     ARTHROPLASTY HIP Right 04/14/2025    Procedure: Right total hip arthroplasty;  Surgeon: Bala Randall MD;  Location: [facility] OR    ARTHROPLASTY REVISION HIP Left 06/24/2019    Procedure: Revision left total hip arthroplasty with a head and liner exchange to a Biomet dual mobility head and liner.;  Surgeon: Bala Randall MD;  Location:  OR    BACK SURGERY      BLADDER SURGERY      Ibarra, [facility],Interstem stimulator implant    CLOSED REDUCTION HIP Left 06/10/2019    Procedure: Closed reduction under general anesthesia left recurrent prosthetic hip dislocation;  Surgeon: Rafat Cazares MD;  Location:  OR    COLONOSCOPY N/A 10/30/2015    Procedure: COMBINED COLONOSCOPY, SINGLE OR MULTIPLE BIOPSY/POLYPECTOMY BY BIOPSY;  Surgeon: Alexis Jackson MD;  Location:  GI    COLONOSCOPY N/A 11/17/2016    Procedure: COMBINED COLONOSCOPY, SINGLE OR MULTIPLE BIOPSY/POLYPECTOMY BY BIOPSY;  Surgeon: Cat Huber MD;  Location:  GI    COLONOSCOPY N/A 10/28/2020    Procedure: COLONOSCOPY;  Surgeon: You Kraus MD;  Location: [facility] OR    COLONOSCOPY      ESOPHAGOSCOPY, GASTROSCOPY, DUODENOSCOPY (EGD), COMBINED N/A 11/17/2016    Procedure: COMBINED ESOPHAGOSCOPY, GASTROSCOPY, DUODENOSCOPY  (EGD), BIOPSY SINGLE OR MULTIPLE;  Surgeon: Cat Huber MD;  Location: [facility] GI    ESOPHAGOSCOPY, GASTROSCOPY, DUODENOSCOPY (EGD), COMBINED N/A 03/12/2020    Procedure: ESOPHAGOGASTRODUODENOSCOPY WITH BIOPSIES;  Surgeon: You Kraus MD;  Location: [facility] OR    ESOPHAGOSCOPY, GASTROSCOPY, DUODENOSCOPY (EGD), COMBINED N/A 10/28/2020    Procedure: ESOPHAGOGASTRODUODENOSCOPY, WITH BIOPSY;  Surgeon: You Kraus MD;  Location: [facility] OR    ESOPHAGOSCOPY, GASTROSCOPY, DUODENOSCOPY (EGD), COMBINED      ESOPHAGOSCOPY, GASTROSCOPY, DUODENOSCOPY (EGD), COMBINED N/A 11/29/2023    Procedure: Esophagoscopy, gastroscopy, duodenoscopy (EGD), combined with biopsies using forceps;  Surgeon: You Kraus MD;  Location: [facility] GI    EXAM UNDER ANESTHESIA, FULGURATE CONDYLOMA ANUS, COMBINED N/A 12/22/2016    Procedure: COMBINED EXAM UNDER ANESTHESIA, FULGURATE CONDYLOMA ANUS;  Surgeon: Nya Cabello MD;  Location: [facility] OR    GENITOURINARY SURGERY      JOINT REPLACEMENT Left 01/01/2017    MARGO, Revision Left MARGO    OTHER SURGICAL HISTORY      interstim stimulator implant    MT CYSTOURETHROSCOPY INJ CHEMODENERVATION BLADDER N/A 01/16/2019    Procedure: CYSTOSCOPY BOTOX BLADDER INJECTIONS (100 UNITS IN 10CC);  Surgeon: Didier Ibarra MD;  Location: [facility] OR;  Service: Urology    MT IMPLANT PERIPH/GASTRIC NEUROSTIM/ N/A 01/08/2020    Procedure: NEW INTERSTIM LEAD, REPLACE OLD; NEW INTERSTIM BATTERY, REPLACE OLD;  Surgeon: Didier Ibarra MD;  Location: [facility] Main OR;  Service: Urology       Prior to Admission Medications   Prior to Admission Medications   Prescriptions Last Dose Informant Patient Reported? Taking?   DULoxetine (CYMBALTA) 60 MG capsule 7/18/2025  Yes Yes   Sig: Take 120 mg by mouth daily    OLANZapine (ZYPREXA) 15 MG tablet 7/18/2025 Bedtime  Yes Yes   Sig: Take 15 mg by mouth at bedtime.   allopurinol (ZYLOPRIM) 300 MG tablet 7/18/2025  No Yes   Sig: Take 1 tablet (300 mg)  by mouth daily.   amoxicillin-clavulanate (AUGMENTIN) 875-125 MG tablet   No No   Sig: Take 1 tablet by mouth 2 times daily for 10 days.   calcium polycarbophil (FIBER-LAX) 625 MG tablet 7/18/2025  No Yes   Sig: Take 1 tablet (625 mg) by mouth daily.   cholecalciferol (VITAMIN D3) 10 mcg (400 units) TABS tablet 7/18/2025 Self Yes Yes   Sig: Take 10 mcg by mouth daily.   ferrous sulfate (FEROSUL) 325 (65 Fe) MG tablet 7/18/2025 Morning  No Yes   Sig: TAKE 1 TABLET BY MOUTH DAILY WITH BREAKFAST   hydrOXYzine leandro (VISTARIL) 25 MG capsule Unknown Self Yes Yes   Sig: Take 25-50 mg by mouth 4 times daily as needed for other (pain).   hydrocortisone 2.5 % cream 7/18/2025 Self Yes Yes   Sig: Apply topically 2 times daily. Apply a thin layer to face once daily for up to 2 weeks, decrease use as rash improves, repeat as needed for flares   ketoconazole (NIZORAL) 2 % external cream Unknown Self Yes Yes   Sig: Apply topically 2 times daily. Apply to rash on face twice daily until improved for seborrheic dermatitis   ketoconazole (NIZORAL) 2 % external shampoo 7/18/2025 Self Yes Yes   Sig: Apply topically daily as needed for itching or irritation.   loperamide (IMODIUM) 2 MG capsule 7/18/2025 Self Yes Yes   Sig: Take 2 mg by mouth 4 times daily as needed for diarrhea.   magnesium 250 MG tablet 7/18/2025 Self Yes Yes   Sig: Take 1 tablet by mouth daily.   mirabegron (MYRBETRIQ) 50 MG 24 hr tablet 7/18/2025  Yes Yes   Sig: Take 1 tablet by mouth daily   oxyBUTYnin ER (DITROPAN XL) 15 MG 24 hr tablet 7/18/2025  No Yes   Sig: Take 2 tablets (30 mg) by mouth daily   potassium gluconate 2.5 MEQ tablet 7/18/2025 Self Yes Yes   Sig: Take 2.5 mEq by mouth daily.   prazosin (MINIPRESS) 1 MG capsule 7/18/2025 Evening  Yes Yes   Sig: Take 3 mg by mouth At Bedtime   tirzepatide-Weight Management (ZEPBOUND) 12.5 MG/0.5ML prefilled pen 7/14/2025  No Yes   Sig: Inject 0.5 mLs (12.5 mg) subcutaneously every 7 days.      Facility-Administered  Medications: None     Allergies   Allergies   Allergen Reactions    Other [No Clinical Screening - See Comments] Other (See Comments)     Awoke from anesthesia with anger and ripping off dressing, after interstim placement, outside hospital 2013       Immunization History   Immunization History   Administered Date(s) Administered    COVID-19 12+ (Pfizer) 12/12/2023    COVID-19 Bivalent 18+ (Moderna) 02/09/2023    COVID-19 MONOVALENT 12+ (Pfizer) 04/22/2021, 05/13/2021    COVID-19 Monovalent 18+ (Moderna) 11/26/2021    Hepatitis B, Adult (Energix-B/Recombivax HB) 01/30/2003    Influenza (IIV3) PF 11/11/2010    Influenza Vaccine >6 months,quad, PF 10/20/2014, 10/31/2016, 12/11/2017, 12/31/2018, 10/25/2019, 01/22/2021, 10/28/2021, 09/12/2023    Influenza Vaccine, 6+MO IM (QUADRIVALENT W/PRESERVATIVES) 09/16/2015    Influenza, Split Virus, Trivalent, Pf (Fluzone\Fluarix) 09/25/2024    Influenza,INJ,MDCK,PF,Quad >6mo(Flucelvax) 02/14/2023    Mantoux Tuberculin Skin Test 05/04/2012, 01/31/2018, 02/05/2018    Pneumococcal 23 valent 01/22/2021    TDAP (Adacel,Boostrix) 01/22/2021    TDAP Vaccine (Boostrix) 11/11/2010       Social History   I have reviewed this patient's social history and updated it with pertinent information if needed. [place]  reports that he quit smoking about 4 months ago. His smoking use included cigarettes and other. He has a 1 pack-year smoking history. He has been exposed to tobacco smoke. He quit smokeless tobacco use about 4 months ago.  His smokeless tobacco use included chew. He reports current alcohol use. He reports current drug use. Drug: Marijuana.    Family History   I have reviewed this patient's family history and updated it with pertinent information if needed.   Family History   Problem Relation Age of Onset    Anxiety Disorder Mother     Depression Mother     Ulcerative Colitis Mother 18    Breast Cancer Maternal Grandmother     Cerebrovascular Disease Maternal Grandmother   "   Breast Cancer Maternal Aunt     Cancer Maternal Grandfather         grt uncles colon cancer       Review of Systems   The 10 point Review of Systems is negative    Physical Exam   Temp: 97  F (36.1  C) Temp src: Core BP: 135/55 Pulse: 79   Resp: (!) 8 SpO2: 99 % O2 Device: Nasal cannula Oxygen Delivery: 3 LPM  Vital Signs with Ranges  Temp:  [97  F (36.1  C)-97.8  F (36.6  C)] 97  F (36.1  C)  Pulse:  [77-92] 79  Resp:  [8-23] 8  BP: ()/(46-75) 135/55  SpO2:  [90 %-99 %] 99 %  350 lbs 15.56 oz  Body mass index is 47.6 kg/m .    GENERAL APPEARANCE:  awake  EYES: Eyes grossly normal to inspection  NECK: no adenopathy  RESP: lungs clear   CV: regular rates and rhythm  LYMPHATICS: normal ant/post cervical and supraclavicular nodes  ABDOMEN: soft, nontender  MS: extremities normal  SKIN: no suspicious lesions or rashes        Data   All laboratory and imaging data in the past 24 hours reviewed  No results for input(s): \"CULT\" in the last 168 hours.  Recent Labs   Lab Test 06/24/19  0844 06/24/19  0843   CULT No anaerobes isolated  No anaerobes isolated  No growth  No growth No anaerobes isolated  No growth          All cultures:  Recent Labs   Lab 07/19/25  1615 07/19/25  1432   CULTURE No growth after 1 day No growth after 1 day      Blood culture:  Results for orders placed or performed during the hospital encounter of 07/19/25   Blood Culture Peripheral blood (BC) Hand, Left    Collection Time: 07/19/25  4:15 PM    Specimen: Hand, Left; Peripheral blood (BC)   Result Value Ref Range    Culture No growth after 1 day    Blood Culture Peripheral blood (BC) Arm, Right    Collection Time: 07/19/25  2:32 PM    Specimen: Arm, Right; Peripheral blood (BC)   Result Value Ref Range    Culture No growth after 1 day    Results for orders placed or performed during the hospital encounter of 05/23/25   Blood Culture Peripheral blood (BC) Arm, Right    Collection Time: 05/23/25  9:18 PM    Specimen: Arm, Right; " Peripheral blood (BC)   Result Value Ref Range    Culture No Growth      *Note: Due to a large number of results and/or encounters for the requested time period, some results have not been displayed. A complete set of results can be found in Results Review.      Urine culture:  No results found. However, due to the size of the patient record, not all encounters were searched. Please check Results Review for a complete set of results.

## 2025-07-22 ENCOUNTER — APPOINTMENT (OUTPATIENT)
Dept: OCCUPATIONAL THERAPY | Facility: CLINIC | Age: 45
DRG: 464 | End: 2025-07-22
Attending: ORTHOPAEDIC SURGERY
Payer: COMMERCIAL

## 2025-07-22 ENCOUNTER — APPOINTMENT (OUTPATIENT)
Dept: PHYSICAL THERAPY | Facility: CLINIC | Age: 45
DRG: 464 | End: 2025-07-22
Attending: ORTHOPAEDIC SURGERY
Payer: COMMERCIAL

## 2025-07-22 ENCOUNTER — HOME INFUSION (OUTPATIENT)
Dept: HOME HEALTH SERVICES | Facility: HOME HEALTH | Age: 45
End: 2025-07-22
Payer: COMMERCIAL

## 2025-07-22 ENCOUNTER — ENROLLMENT (OUTPATIENT)
Dept: HOME HEALTH SERVICES | Facility: HOME HEALTH | Age: 45
End: 2025-07-22
Payer: COMMERCIAL

## 2025-07-22 LAB
ANION GAP SERPL CALCULATED.3IONS-SCNC: 11 MMOL/L (ref 7–15)
BASOPHILS # BLD AUTO: 0 10E3/UL (ref 0–0.2)
BASOPHILS NFR BLD AUTO: 0 %
BUN SERPL-MCNC: 16 MG/DL (ref 6–20)
CALCIUM SERPL-MCNC: 8.7 MG/DL (ref 8.8–10.4)
CHLORIDE SERPL-SCNC: 104 MMOL/L (ref 98–107)
CREAT SERPL-MCNC: 1.16 MG/DL (ref 0.67–1.17)
EGFRCR SERPLBLD CKD-EPI 2021: 80 ML/MIN/1.73M2
EOSINOPHIL # BLD AUTO: 0 10E3/UL (ref 0–0.7)
EOSINOPHIL NFR BLD AUTO: 0 %
ERYTHROCYTE [DISTWIDTH] IN BLOOD BY AUTOMATED COUNT: 12.7 % (ref 10–15)
GLUCOSE SERPL-MCNC: 111 MG/DL (ref 70–99)
HCO3 SERPL-SCNC: 23 MMOL/L (ref 22–29)
HCT VFR BLD AUTO: 25.7 % (ref 40–53)
HGB BLD-MCNC: 8.9 G/DL (ref 13.3–17.7)
HGB BLD-MCNC: 8.9 G/DL (ref 13.3–17.7)
IMM GRANULOCYTES # BLD: 0.1 10E3/UL
IMM GRANULOCYTES NFR BLD: 1 %
LYMPHOCYTES # BLD AUTO: 1.7 10E3/UL (ref 0.8–5.3)
LYMPHOCYTES NFR BLD AUTO: 11 %
MCH RBC QN AUTO: 29.1 PG (ref 26.5–33)
MCHC RBC AUTO-ENTMCNC: 34.6 G/DL (ref 31.5–36.5)
MCV RBC AUTO: 84 FL (ref 78–100)
MCV RBC AUTO: 84 FL (ref 78–100)
MONOCYTES # BLD AUTO: 0.7 10E3/UL (ref 0–1.3)
MONOCYTES NFR BLD AUTO: 4 %
MRSA DNA SPEC QL NAA+PROBE: POSITIVE
NEUTROPHILS # BLD AUTO: 13.2 10E3/UL (ref 1.6–8.3)
NEUTROPHILS NFR BLD AUTO: 84 %
NRBC # BLD AUTO: 0 10E3/UL
NRBC BLD AUTO-RTO: 0 /100
PLATELET # BLD AUTO: 326 10E3/UL (ref 150–450)
POTASSIUM SERPL-SCNC: 5.1 MMOL/L (ref 3.4–5.3)
RBC # BLD AUTO: 3.06 10E6/UL (ref 4.4–5.9)
SA TARGET DNA: POSITIVE
SODIUM SERPL-SCNC: 138 MMOL/L (ref 135–145)
WBC # BLD AUTO: 15.7 10E3/UL (ref 4–11)

## 2025-07-22 PROCEDURE — 97161 PT EVAL LOW COMPLEX 20 MIN: CPT | Mod: GP | Performed by: PHYSICAL THERAPIST

## 2025-07-22 PROCEDURE — 94660 CPAP INITIATION&MGMT: CPT

## 2025-07-22 PROCEDURE — 250N000011 HC RX IP 250 OP 636: Performed by: ORTHOPAEDIC SURGERY

## 2025-07-22 PROCEDURE — 87640 STAPH A DNA AMP PROBE: CPT | Performed by: INTERNAL MEDICINE

## 2025-07-22 PROCEDURE — 250N000013 HC RX MED GY IP 250 OP 250 PS 637: Performed by: ORTHOPAEDIC SURGERY

## 2025-07-22 PROCEDURE — 258N000003 HC RX IP 258 OP 636: Performed by: INTERNAL MEDICINE

## 2025-07-22 PROCEDURE — 97165 OT EVAL LOW COMPLEX 30 MIN: CPT | Mod: GO

## 2025-07-22 PROCEDURE — 36415 COLL VENOUS BLD VENIPUNCTURE: CPT | Performed by: INTERNAL MEDICINE

## 2025-07-22 PROCEDURE — 97110 THERAPEUTIC EXERCISES: CPT | Mod: GP | Performed by: PHYSICAL THERAPIST

## 2025-07-22 PROCEDURE — 82310 ASSAY OF CALCIUM: CPT | Performed by: INTERNAL MEDICINE

## 2025-07-22 PROCEDURE — 99232 SBSQ HOSP IP/OBS MODERATE 35: CPT | Performed by: INTERNAL MEDICINE

## 2025-07-22 PROCEDURE — 85025 COMPLETE CBC W/AUTO DIFF WBC: CPT | Performed by: INTERNAL MEDICINE

## 2025-07-22 PROCEDURE — 97535 SELF CARE MNGMENT TRAINING: CPT | Mod: GO

## 2025-07-22 PROCEDURE — 120N000001 HC R&B MED SURG/OB

## 2025-07-22 PROCEDURE — 97530 THERAPEUTIC ACTIVITIES: CPT | Mod: GP | Performed by: PHYSICAL THERAPIST

## 2025-07-22 PROCEDURE — 250N000011 HC RX IP 250 OP 636: Performed by: INTERNAL MEDICINE

## 2025-07-22 RX ORDER — SENNOSIDES 8.6 MG
CAPSULE ORAL
Qty: 70 TABLET | Refills: 0 | Status: ON HOLD | OUTPATIENT
Start: 2025-07-22

## 2025-07-22 RX ORDER — AMOXICILLIN 250 MG
1 CAPSULE ORAL 2 TIMES DAILY
Qty: 30 TABLET | Refills: 0 | Status: ON HOLD | OUTPATIENT
Start: 2025-07-22

## 2025-07-22 RX ORDER — ACETAMINOPHEN 325 MG/1
650 TABLET ORAL EVERY 6 HOURS
Qty: 100 TABLET | Refills: 0 | Status: ON HOLD | OUTPATIENT
Start: 2025-07-22

## 2025-07-22 RX ORDER — HYDROXYZINE HYDROCHLORIDE 25 MG/1
25-50 TABLET, FILM COATED ORAL EVERY 6 HOURS PRN
Qty: 60 TABLET | Refills: 0 | Status: ON HOLD | OUTPATIENT
Start: 2025-07-22

## 2025-07-22 RX ORDER — OXYCODONE HYDROCHLORIDE 5 MG/1
5-10 TABLET ORAL EVERY 4 HOURS PRN
Qty: 25 TABLET | Refills: 0 | Status: SHIPPED | OUTPATIENT
Start: 2025-07-22 | End: 2025-07-25

## 2025-07-22 RX ADMIN — FERROUS SULFATE TAB 325 MG (65 MG ELEMENTAL FE) 325 MG: 325 (65 FE) TAB at 08:15

## 2025-07-22 RX ADMIN — ACETAMINOPHEN 975 MG: 325 TABLET ORAL at 08:14

## 2025-07-22 RX ADMIN — VANCOMYCIN HYDROCHLORIDE 1500 MG: 10 INJECTION, POWDER, LYOPHILIZED, FOR SOLUTION INTRAVENOUS at 04:08

## 2025-07-22 RX ADMIN — OLANZAPINE 15 MG: 10 TABLET, FILM COATED ORAL at 22:46

## 2025-07-22 RX ADMIN — Medication 200 MG: at 08:17

## 2025-07-22 RX ADMIN — ACETAMINOPHEN 975 MG: 325 TABLET ORAL at 15:24

## 2025-07-22 RX ADMIN — PRAZOSIN HYDROCHLORIDE 3 MG: 1 CAPSULE ORAL at 22:45

## 2025-07-22 RX ADMIN — ENOXAPARIN SODIUM 40 MG: 40 INJECTION SUBCUTANEOUS at 08:15

## 2025-07-22 RX ADMIN — ALLOPURINOL 300 MG: 300 TABLET ORAL at 08:14

## 2025-07-22 RX ADMIN — DULOXETINE 120 MG: 60 CAPSULE, DELAYED RELEASE ORAL at 08:14

## 2025-07-22 RX ADMIN — OXYCODONE HYDROCHLORIDE 5 MG: 5 TABLET ORAL at 14:13

## 2025-07-22 RX ADMIN — FAMOTIDINE 20 MG: 20 TABLET, FILM COATED ORAL at 21:20

## 2025-07-22 RX ADMIN — PANTOPRAZOLE SODIUM 40 MG: 40 TABLET, DELAYED RELEASE ORAL at 08:14

## 2025-07-22 RX ADMIN — VANCOMYCIN HYDROCHLORIDE 1500 MG: 10 INJECTION, POWDER, LYOPHILIZED, FOR SOLUTION INTRAVENOUS at 15:24

## 2025-07-22 RX ADMIN — SENNOSIDES AND DOCUSATE SODIUM 1 TABLET: 50; 8.6 TABLET ORAL at 21:20

## 2025-07-22 RX ADMIN — KETOROLAC TROMETHAMINE 30 MG: 30 INJECTION, SOLUTION INTRAMUSCULAR at 21:18

## 2025-07-22 RX ADMIN — KETOROLAC TROMETHAMINE 30 MG: 30 INJECTION, SOLUTION INTRAMUSCULAR at 10:42

## 2025-07-22 RX ADMIN — HYDROCORTISONE: 25 CREAM TOPICAL at 08:15

## 2025-07-22 RX ADMIN — OXYCODONE HYDROCHLORIDE 5 MG: 5 TABLET ORAL at 08:15

## 2025-07-22 RX ADMIN — CEFTRIAXONE 2 G: 2 INJECTION, POWDER, FOR SOLUTION INTRAMUSCULAR; INTRAVENOUS at 12:53

## 2025-07-22 RX ADMIN — OXYCODONE HYDROCHLORIDE 10 MG: 10 TABLET ORAL at 23:35

## 2025-07-22 RX ADMIN — CHOLECALCIFEROL TAB 10 MCG (400 UNIT) 10 MCG: 10 TAB at 08:17

## 2025-07-22 RX ADMIN — OXYCODONE HYDROCHLORIDE 10 MG: 10 TABLET ORAL at 18:56

## 2025-07-22 RX ADMIN — MIRABEGRON 50 MG: 50 TABLET, EXTENDED RELEASE ORAL at 08:17

## 2025-07-22 RX ADMIN — OXYBUTYNIN CHLORIDE 30 MG: 5 TABLET, EXTENDED RELEASE ORAL at 08:14

## 2025-07-22 RX ADMIN — ENOXAPARIN SODIUM 40 MG: 40 INJECTION SUBCUTANEOUS at 21:20

## 2025-07-22 RX ADMIN — FAMOTIDINE 20 MG: 20 TABLET, FILM COATED ORAL at 08:14

## 2025-07-22 RX ADMIN — ACETAMINOPHEN 975 MG: 325 TABLET ORAL at 23:35

## 2025-07-22 ASSESSMENT — ACTIVITIES OF DAILY LIVING (ADL)
ADLS_ACUITY_SCORE: 42
ADLS_ACUITY_SCORE: 45
ADLS_ACUITY_SCORE: 42
ADLS_ACUITY_SCORE: 45
ADLS_ACUITY_SCORE: 42
ADLS_ACUITY_SCORE: 42
ADLS_ACUITY_SCORE: 45
ADLS_ACUITY_SCORE: 42
ADLS_ACUITY_SCORE: 45
ADLS_ACUITY_SCORE: 42
ADLS_ACUITY_SCORE: 42
ADLS_ACUITY_SCORE: 45
ADLS_ACUITY_SCORE: 42
ADLS_ACUITY_SCORE: 45
ADLS_ACUITY_SCORE: 42
ADLS_ACUITY_SCORE: 45
ADLS_ACUITY_SCORE: 42
ADLS_ACUITY_SCORE: 45
ADLS_ACUITY_SCORE: 42

## 2025-07-22 NOTE — PLAN OF CARE
"Progress Note:      POD1.  Alert, oriented x4. Slow to response at times. Resting between cares. Was up in chair. Rates pain 8/10. Able to control pain with Oxycodone 5mg, tylenol, toradol. Pleasant up in chair talking with family. Appears able to rest between cares.   [place] drain. Dressing is dry and intact except one pencil eraser size drainage.   Severe edema to bilateral legs/feet. Mobility improving. Dry skin on the heels; feet care performed. No numbness. L hip incision.  Declined BM meds. Eat well. Voids, urinal. Hbg 8.9. WBC 15.7.         Patient vital signs are at baseline: yes. RA. CPAP when resting.     Patient able to ambulate as they were prior to admission or with assist devices provided by therapies during their stay:  yes- g/b + walker; assist of 1. Improved mobility today compared to yesterday.     Patient MUST void prior to discharge:  Yes, urinal     Patient able to tolerate oral intake:  Yes    Pain has adequate pain control using Oral analgesics:  No,  Reason: rates 8/10    Does patient have an identified :  Yes, guardian    Has goal D/C date and time been discussed with patient:  Yes. Plan is home with possible PICC line and antibiotics. Vanco and rocephin. Awaiting cultures. ID following.        Problem: Adult Inpatient Plan of Care  Goal: Plan of Care Review  Description: The Plan of Care Review/Shift note should be completed every shift.  The Outcome Evaluation is a brief statement about your assessment that the patient is improving, declining, or no change.  This information will be displayed automatically on your shift  note.  Outcome: Progressing  Flowsheets (Taken 7/22/2025 1116)  Plan of Care Reviewed With: patient  Goal: Patient-Specific Goal (Individualized)  Description: You can add care plan individualizations to a care plan. Examples of Individualization might be:  \"Parent requests to be called daily at 9am for status\", \"I have a hard time hearing out of my right ear\", or \"Do " "not touch me to wake me up as it startles  me\".  Outcome: Progressing  Goal: Absence of Hospital-Acquired Illness or Injury  Outcome: Progressing  Intervention: Identify and Manage Fall Risk  Recent Flowsheet Documentation  Taken 7/22/2025 1000 by Alec Logan  Safety Promotion/Fall Prevention:   activity supervised   assistive device/personal items within reach   clutter free environment maintained   increased rounding and observation   increase visualization of patient   lighting adjusted   mobility aid in reach  Intervention: Prevent and Manage VTE (Venous Thromboembolism) Risk  Recent Flowsheet Documentation  Taken 7/22/2025 1000 by Alec Logan  VTE Prevention/Management: SCDs off (sequential compression devices)  Intervention: Prevent Infection  Recent Flowsheet Documentation  Taken 7/22/2025 1000 by Alec Logan  Infection Prevention:   environmental surveillance performed   equipment surfaces disinfected   hand hygiene promoted   rest/sleep promoted   single patient room provided  Goal: Optimal Comfort and Wellbeing  Outcome: Progressing  Intervention: Monitor Pain and Promote Comfort  Recent Flowsheet Documentation  Taken 7/22/2025 0758 by Alec Logan  Pain Management Interventions: medication (see MAR)  Goal: Readiness for Transition of Care  Outcome: Progressing     Problem: Infection  Goal: Absence of Infection Signs and Symptoms  Outcome: Progressing  Intervention: Prevent or Manage Infection  Recent Flowsheet Documentation  Taken 7/22/2025 1000 by Alec Logan  Infection Management: aseptic technique maintained     Problem: Pain Acute  Goal: Optimal Pain Control and Function  Outcome: Progressing  Intervention: Develop Pain Management Plan  Recent Flowsheet Documentation  Taken 7/22/2025 0758 by Alec Logan  Pain Management Interventions: medication (see MAR)  Intervention: Prevent or Manage Pain  Recent Flowsheet Documentation  Taken 7/22/2025 1000 by Alec Logan  Bowel " Elimination Promotion:   adequate fluid intake promoted   ambulation promoted   privacy promoted  Medication Review/Management: medications reviewed   Goal Outcome Evaluation:      Plan of Care Reviewed With: patient

## 2025-07-22 NOTE — PROGRESS NOTES
Orthopedic Surgery  7/22/2025  POD#: 1    S: Patient voices no complaints today. Pain managed well.    O: Blood pressure (!) 145/71, pulse 68, temperature 96.9  F (36.1  C), temperature source Temporal, resp. rate 16, height 1.829 m (6'), weight (!) 159.2 kg (350 lb 15.6 oz), SpO2 95%.  Lab Results   Component Value Date    HGB 9.7 07/21/2025    HGB 13.4 10/26/2020     Lab Results   Component Value Date    INR 1.20 07/21/2025    INR 0.92 01/09/2017        I/O last 3 completed shifts:  In: 3455 [P.O.:480; I.V.:2175]  Out: 2450 [Urine:950; Blood:1500]    Neurovascularly intact.  Calves are negative bilaterally, both soft and nontender.  The wound is C/D/I. PAOLA dressing intact to negative pressure with minimal bloody drainage.  The wound looks good with minimal erythema of the surrounding skin.    A: Mr. Greenwood is doing well status post Procedure(s):  Incision and drainage hip, combined, with revision of head and acetabular liner.    P:   1. Mobilize and continue physical therapy. No hip precautions.  2. Anticoagulation - discharge on ASA  3. Septic R MARGO - s/p I&D 7/21 with head and liner exchange.  ID following, appreciate abx recs; likely 6 weeks of abx via PICC line, waiting on surgical cxs.  4. Pain management - controlled  5. Anticipate discharge to home with abx via PICC line pending coverage for infusions.      Judy Barragan PA-C  [facility]  O: [phone]

## 2025-07-22 NOTE — PROGRESS NOTES
Care Management Follow Up    Length of Stay (days): 3    Expected Discharge Date: 07/24/2025     Concerns to be Addressed: all concerns addressed in this encounter     Patient plan of care discussed at interdisciplinary rounds: Yes    Anticipated Discharge Disposition:  home, home infusion    Education Provided on the Discharge Plan:  yes  Patient/Family in Agreement with the Plan:  yes    Referrals Placed by CM/SW:  home infusion  Private pay costs discussed: Not applicable    Discussed  Partnership in Safe Discharge Planning  document with patient/family: No     Handoff Completed: Yes, [facility] PCP: Internal handoff referral completed    Additional Information:  Patient will be discharging on iv antibiotics for 6 weeks per MD note. Sent for benefit check with [facility]. Met with patient and reviewed process for home infusion and iv antibiotics. Also spoke with patient's mother/guardian regarding home infusion. Patient expresses he has help at home to assist with the iv antibiotics. His mother confirmed this. His PCA/  Jose would be able to assist per his mother. Will discuss with home infusion regarding plan. Plan is to coordinate teaching when both Jose and his mother can be here.     Next Steps: follow up on benefit check, coordinate with home infusion    Carol Bradford RN  Care Coordinator  [facility]

## 2025-07-22 NOTE — PLAN OF CARE
"  Problem: Adult Inpatient Plan of Care  Goal: Plan of Care Review  Description: The Plan of Care Review/Shift note should be completed every shift.  The Outcome Evaluation is a brief statement about your assessment that the patient is improving, declining, or no change.  This information will be displayed automatically on your shift  note.  Outcome: Progressing  Flowsheets (Taken 7/22/2025 0612)  Outcome Evaluation: Pain controlled with scheduled tylenol, CPAP @ HS. PIV SL.  Plan of Care Reviewed With: patient  Overall Patient Progress: improving  Goal: Patient-Specific Goal (Individualized)  Description: You can add care plan individualizations to a care plan. Examples of Individualization might be:  \"Parent requests to be called daily at 9am for status\", \"I have a hard time hearing out of my right ear\", or \"Do not touch me to wake me up as it startles  me\".  Outcome: Progressing  Goal: Absence of Hospital-Acquired Illness or Injury  Outcome: Progressing  Intervention: Identify and Manage Fall Risk  Recent Flowsheet Documentation  Taken 7/21/2025 1929 by Melony Almodovar, RN  Safety Promotion/Fall Prevention:   activity supervised   assistive device/personal items within reach   clutter free environment maintained   increased rounding and observation   increase visualization of patient   lighting adjusted   mobility aid in reach  Intervention: Prevent Infection  Recent Flowsheet Documentation  Taken 7/21/2025 1929 by Melony Almodovar, RN  Infection Prevention:   environmental surveillance performed   equipment surfaces disinfected   hand hygiene promoted   rest/sleep promoted   single patient room provided  Goal: Optimal Comfort and Wellbeing  Outcome: Progressing  Goal: Readiness for Transition of Care  Outcome: Progressing     Problem: Infection  Goal: Absence of Infection Signs and Symptoms  Outcome: Progressing     Problem: Pain Acute  Goal: Optimal Pain Control and Function  Outcome: Progressing  Intervention: " Prevent or Manage Pain  Recent Flowsheet Documentation  Taken 7/21/2025 1929 by Melony Almodovar, RN  Medication Review/Management: medications reviewed   Goal Outcome Evaluation:      Plan of Care Reviewed With: patient    Overall Patient Progress: improvingOverall Patient Progress: improving    Outcome Evaluation: Pain controlled with scheduled tylenol, CPAP @ HS. [facility].

## 2025-07-22 NOTE — PROGRESS NOTES
07/22/25 0908   Appointment Info   Signing Clinician's Name / Credentials (PT) RU Sanz   Student Supervision Line of sight supervision provided;Therapy services provided with the co-signing licensed therapist guiding and directing the services, and providing the skilled judgement and assessment throughout the session   Living Environment   People in Home alone   Current Living Arrangements apartment   Home Accessibility stairs to enter home   Number of Stairs, Main Entrance 3   Stair Railings, Main Entrance railings on both sides of stairs   Transportation Anticipated family or friend will provide   Living Environment Comments Pt lives alone in apartment with 3 MICKEY and elevators within. Pt has family that lives nearby and a care coordinator that is around 1-5 everyday and availabler 24 hrs via phone. Pt does not mentions any concerns managing at home.   Self-Care   Usual Activity Tolerance good   Current Activity Tolerance moderate   Equipment Currently Used at Home cane, straight;walker, rolling;grab bar, toilet;grab bar, tub/shower   Fall history within last six months yes   Number of times patient has fallen within last six months 2   General Information   Onset of Illness/Injury or Date of Surgery 07/19/25   Referring Physician Bala Randall MD   Patient/Family Therapy Goals Statement (PT) return home   Pertinent History of Current Problem (include personal factors and/or comorbidities that impact the POC) Per H&P pt is a 44 year old male who has a history of asthma, Patel's esophagus with GERD, hypertension, depression, mild mental retardation, chronic kidney disease, gout, morbid obesity, sleep apnea, overactive bladder, iron deficiency anemia, and a right total hip arthroplasty in April 2025 coming in with an infected surgical site on that hip.  He has been dealing with increased edema, redness, and tenderness of that right hip.  Was seen at [facility] urgent care 7/11 and started on  doxycycline and then later again put on Augmentin 7/16/2025.  The symptoms of gotten worse gradually.  Unfortunately cannot bear weight now.  Actually had a fall yesterday falling on his right side waking up with bilateral ankle swelling.  Patient denies any other symptoms like fever, chills, sweats.  Has no current nausea or vomiting.  He has no chest pain.  Ortho see the patient and wants to hold off on antibiotics at this point.   Existing Precautions/Restrictions weight bearing   Weight-Bearing Status - RLE weight-bearing as tolerated   Cognition   Affect/Mental Status (Cognition) flat/blunted affect;low arousal/lethargic   Orientation Status (Cognition) oriented x 4   Follows Commands (Cognition) WFL   Cognitive Status Comments Pt is lethargic and sleepy at arrival. Pt able to follow commands with extra cueing, continues to look sleepy throughout session.   Pain Assessment   Patient Currently in Pain   (pain in R hip)   Range of Motion (ROM)   Range of Motion ROM deficits secondary to surgical procedure   Strength (Manual Muscle Testing)   Strength (Manual Muscle Testing) Deficits observed during functional mobility   Strength Comments Pt unable to perform R SLR, demonstrates good quad activation with QS. Decreased activity tolerance.   Bed Mobility   Comment, (Bed Mobility) IND supine<>sit   Transfers   Comment, (Transfers) SBA with FWW sit<>stand from raised bed height   Gait/Stairs (Locomotion)   Distance in Feet (Gait) 10   Pattern (Gait) step-through   Deviations/Abnormal Patterns (Gait) antalgic;gait speed decreased;weight shifting decreased;base of support, wide   Comment, (Gait/Stairs) CGA and FWW   Balance   Balance Comments Pt needs assistance of FWW for extra support with all mobility.   Clinical Impression   Criteria for Skilled Therapeutic Intervention Yes, treatment indicated   PT Diagnosis (PT) decreased functional mobility   Influenced by the following impairments impaired strength, ROM, pain,  activity tolerance   Functional limitations due to impairments impaired transfers, gait, stairs   Clinical Presentation (PT Evaluation Complexity) stable   Clinical Presentation Rationale clinical judgement   Clinical Decision Making (Complexity) low complexity   Planned Therapy Interventions (PT) balance training;gait training;home exercise program;neuromuscular re-education;patient/family education;ROM (range of motion);stair training;strengthening;stretching;cryotherapy;transfer training;progressive activity/exercise   Risk & Benefits of therapy have been explained evaluation/treatment results reviewed;risks/benefits reviewed;care plan/treatment goals reviewed;participants voiced agreement with care plan;current/potential barriers reviewed;participants included;patient   PT Total Evaluation Time   PT Eval, Low Complexity Minutes (18029) 7   Physical Therapy Goals   PT Frequency Daily   PT Predicted Duration/Target Date for Goal Attainment 07/25/25   PT Goals Transfers;Gait;Stairs   PT: Transfers Modified independent;Sit to/from stand;Assistive device   PT: Gait Modified independent;Assistive device;Greater than 200 feet   PT: Stairs Modified independent;3 stairs;Rail on both sides   PT Discharge Planning   PT Plan review exercises, inc independence with transfers, inc ambulation distance, trial stairs   PT Discharge Recommendation (DC Rec) home with assist;home with home care physical therapy   PT Rationale for DC Rec Pt presents baseline functional mobility and lives alone in apartment with good social support nearby but not always with him. Pt demonstrates decreased tolerance to activity and symptoms of lightheadness limiting mobility right now. Expect good progress towards goals with continued IP PT, anticipate will be able to d/c home with assistance, HHPT. Will continue to update recs as warrented.   PT Brief overview of current status Ax1 FWW   PT Total Distance Amb During Session (feet) 25   Physical  Therapy Time and Intention   Timed Code Treatment Minutes 29   Total Session Time (sum of timed and untimed services) 36

## 2025-07-22 NOTE — PROGRESS NOTES
07/22/25 1323   Appointment Info   Signing Clinician's Name / Credentials (OT) Denise Singh, OTR/L   Rehab Comments (OT) RLE WBAT; no hip precautions   Living Environment   People in Home alone   Current Living Arrangements apartment   Home Accessibility stairs to enter home   Number of Stairs, Main Entrance 3   Stair Railings, Main Entrance railings on both sides of stairs   Transportation Anticipated family or friend will provide   Living Environment Comments Pt lives alone in apartment with 3 MICKEY and elevators within. Pt has family that lives nearby and a care coordinator that is around 1-5 everyday and availabler 24 hrs via phone. Tub shower w/ chair, however pt reports chair does not fit well. Laundry located in basement of apt building   Self-Care   Usual Activity Tolerance good   Current Activity Tolerance moderate   Equipment Currently Used at Home cane, straight;walker, rolling;grab bar, toilet;grab bar, tub/shower;shower chair;dressing device  (long handled shoe horn)   Fall history within last six months yes   Number of times patient has fallen within last six months 2   Activity/Exercise/Self-Care Comment Pt reports ind w/ ADLs at baseline. PCA assists w/ socks. Pt has SPC, FWW, 4WW for use as needed   Instrumental Activities of Daily Living (IADL)   IADL Comments PCA assists w/ laundry, med mgmt, driving as needed. Pt and PCA cook together   General Information   Onset of Illness/Injury or Date of Surgery 07/19/25   Referring Physician Bala Randall MD   Patient/Family Therapy Goal Statement (OT) Pt would like to return home   Additional Occupational Profile Info/Pertinent History of Current Problem Per chart: 44 year old male who has a history of asthma, Patel's esophagus with GERD, hypertension, depression, mild mental retardation, chronic kidney disease, gout, morbid obesity, sleep apnea, overactive bladder, iron deficiency anemia, and a right total hip arthroplasty in April 2025 coming  "in with an infected surgical site on that hip. He has been dealing with increased edema, redness, and tenderness of that right hip. Was seen at [facility] urgent care 7/11 and started on doxycycline and then later again put on Augmentin 7/16/2025. The symptoms of gotten worse gradually. Unfortunately cannot bear weight now. Actually had a fall yesterday falling on his right side waking up with bilateral ankle swelling. Patient denies any other symptoms like fever, chills, sweats. Has no current nausea or vomiting. He has no chest pain. Ortho see the patient and wants to hold off on antibiotics at this point.   Existing Precautions/Restrictions fall;weight bearing   Left Lower Extremity (Weight-bearing Status) full weight-bearing (FWB)   Right Lower Extremity (Weight-bearing Status) weight-bearing as tolerated (WBAT)   Cognitive Status Examination   Orientation Status orientation to person, place and time   Affect/Mental Status (Cognitive) WFL   Follows Commands follows one-step commands;over 90% accuracy   Cognitive Status Comments Pt oriented to self, situation, name of hospital, month, year, day of week. Pt stating date as \"24th.\"   Visual Perception   Visual Impairment/Limitations WFL   Sensory   Sensory Quick Adds sensation intact   Pain Assessment   Patient Currently in Pain   (Pt reporting pain in RLE controlled during session)   Range of Motion Comprehensive   Comment, General Range of Motion Not formally assessed, BUE WFL w/ functional tasks. RLE limitations secondary to surgical procedure   Strength Comprehensive (MMT)   Comment, General Manual Muscle Testing (MMT) Assessment Not formally assessed, BUE WFL w/ functional tasks. RLE limitations secondary to surgical procedure   Bed Mobility   Comment (Bed Mobility) NT - pt in recliner upon arrival   Transfers   Transfers sit-stand transfer;toilet transfer   Sit-Stand Transfer   Sit-Stand Gaines (Transfers) supervision   Toilet Transfer   Type (Toilet Transfer) " sit-stand;stand-sit   Angelina Level (Toilet Transfer) supervision   Assistive Device (Toilet Transfer) grab bars/safety frame   Balance   Balance Comments Not formally assessed, no LOB noted during session   Activities of Daily Living   BADL Assessment/Intervention lower body dressing;grooming;toileting   Lower Body Dressing Assessment/Training   Angelina Level (Lower Body Dressing) moderate assist (50% patient effort)   Grooming Assessment/Training   Angelina Level (Grooming) supervision;set up   Comment, (Grooming) Per clinical judgement   Toileting   Angelina Level (Toileting) supervision   Comment, (Toileting) Per clinical judgement   Clinical Impression   Criteria for Skilled Therapeutic Interventions Met (OT) Yes, treatment indicated   OT Diagnosis Impaired I/ADL independence   OT Problem List-Impairments impacting ADL problems related to;activity tolerance impaired;mobility;range of motion (ROM);strength;pain;post-surgical precautions   Assessment of Occupational Performance 3-5 Performance Deficits   Identified Performance Deficits toileting, dressing, functional mobility, IADLs   Planned Therapy Interventions (OT) ADL retraining;IADL retraining;ROM;strengthening;transfer training;home program guidelines;progressive activity/exercise;risk factor education   Clinical Decision Making Complexity (OT) problem focused assessment/low complexity   Risk & Benefits of therapy have been explained evaluation/treatment results reviewed;care plan/treatment goals reviewed;risks/benefits reviewed;current/potential barriers reviewed;participants voiced agreement with care plan;participants included;patient   OT Total Evaluation Time   OT Eval, Low Complexity Minutes (44546) 8   OT Goals   Therapy Frequency (OT) Daily   OT Predicted Duration/Target Date for Goal Attainment 07/29/25   OT Goals Hygiene/Grooming;Lower Body Dressing;Transfers;Toilet Transfer/Toileting;OT Goal 1   OT: Hygiene/Grooming independent    OT: Lower Body Dressing Modified independent;using adaptive equipment;including set-up/clothing retrieval   OT: Transfer Independent  (Shower tub tx)   OT: Toilet Transfer/Toileting Independent;toilet transfer;cleaning and garment management   OT: Goal 1 Pt will verbalize 3 fall risk reduction methods for inc safety within home environment   Interventions   Interventions Quick Adds Self-Care/Home Management   Self-Care/Home Management   Self-Care/Home Mgmt/ADL, Compensatory, Meal Prep Minutes (10884) 18   Symptoms Noted During/After Treatment (Meal Preparation/Planning Training) none   Treatment Detail/Skilled Intervention Pt greeted in recliner chair, agreeable to OT. Parents present upon arrival, leaving prior to session begin. Pt w/ difficulties doffing socks, edu on AE for LB dressing (reacher, sock aid). Pt doff both socks using reacher. Pt trial donning L sock using sock aid, accurately completing set up however unable to don d/t sock aid being too narrow. Pt open to trial wider sock aid next session. Pt STS and amb to BR w/ FWW initially CGA, progerssing to SBA w/ OT managing IV pole. Pt transfer to/from toilet w/ grab bar SBA. Pt declining further ADLs at this time. Pt amb and transfer to recliner chair SBA. Pt issued AE handout, edu on purchasing options. Pt edu on shower safety in home environment (trial w/o water first, monitor symptoms w/ pain meds, lukewarm water, etc), pt verbalized understanding. All pt questions answered within OT scope. Pt in recliner end of session, alarm on, all needs within reach   OT Discharge Planning   OT Plan LB dressing w/ AE (bring wider sock aid), shower tub tx, g/h sinkside, toileting   OT Discharge Recommendation (DC Rec)   (defer to ortho)   OT Rationale for DC Rec Pt functioning below baseline, limited by dec act suraj, pain. Pt lives alone, ind w/ ADLs. Pt has PCA M-F from 1-5PM, reports PCA available 24/7 if needed. Pt currently Ax1 for ADL safety. Anticipate w/ cont  IP OT and med mgmt, pt will progress to SBA-Ind for ADLs and will be able to discharge home with assistance. IP OT will cont to follow   OT Brief overview of current status Goals of therapy will be to address safe mobility and make recs for d/c to next level of care. Pt and RN will continue to follow all falls risk precautions as documented by RN staff while hospitalized. Ax1 w/ FWW   OT Total Distance Amb During Session (feet) 40   Total Session Time   Timed Code Treatment Minutes 18   Total Session Time (sum of timed and untimed services) 26

## 2025-07-22 NOTE — PROGRESS NOTES
Hospitalist Medicine Progress Note   [facility]       Kimo Greenwood is a 44 year old gentleman with asthma, Patel's esophagus, GERD, hypertension, depression, mild mental retardation, chronic kidney disease, gout, morbid obesity, obstructive sleep apnea, overactive bladder, iron deficiency anemia and right total hip arthroplasty done in April 2025 who came into [facility] 7/19/2025 with fall and symptoms of infection of the right hip associated with progressively worsening edema redness and tenderness of the right hip to a point that he was unable to bear weight on it.  He was seen at [facility] urgent care on 7/11/2025 and started on doxycycline and then later again put on Augmentin 7/16/2025.  I did mention CRP was 130 WBC 17.7 hemoglobin 10.2 lactic acid was normal at 0.8 on 7/21/2025 he had incision and drainage of the right hip with revision of the head and acetabular liner by Dr. Bala Randall MD aspirate from the right hip grew Staph aureus he is on ceftriaxone 2 g daily       Date of Admission:  7/19/2025  Assessment & Plan     Surgical site infection  S/p right total hip arthroplasty in April 2025  S/p incision and drainage of the right hip by MD Johann 7/21/2025  On treatment with ceftriaxone 2 g daily  Aspirate from the right hip is growing Staph aureus  I will check MRSA in the nose and until then continue the patient on vancomycin  Infectious diseases would like to continue 6 weeks of IV antibiotic therapy followed by 3 months of oral antibiotic therapy  PT OT evaluation and treatment  Awaiting discharge destination by  recommendation  And okay from surgery for discharge in 2 to 3 days    #History of mild mental retardation  -Patient mentioned that he lives independently at his own apartment with a close follow-up and visit from   -Social service following with us.  - Patient has a listed guardian     #History of bronchial  asthma  -Not in exacerbation     #History of Patel's esophagus  #History of GERD  -Continued on home regimen of PPI     #History of benign essential hypertension   -Home regimen of prazosin was resumed     #History of CKD  -Appears to be at baseline and continue to monitor    Gout, hyperuricacidemia   Recently treated for gout with colchicine, steroid burst.  Should be on allopurinol so will resume.  Patient had not been taking at home     #History of depression  - On Cymbalta and Zyprexa     #History of morbidly obese  #History of LOREN    Plan:   CRP in a.m.  CBC in a.m.      Diet: Advance Diet as Tolerated: Regular Diet Adult  Diet    DVT Prophylaxis: Enoxaparin (Lovenox) SQ  Germain Catheter: Not present  Code Status: Full Code         Medically Ready for Discharge: Anticipated in 2-4 Days    Clinically Significant Risk Factors               # Hypoalbuminemia: Lowest albumin = 3.1 g/dL at 7/19/2025  2:31 PM, will monitor as appropriate  # Coagulation Defect: INR = 1.20 (Ref range: 0.85 - 1.15) and/or PTT = 35 Seconds (Ref range: 22 - 38 Seconds), will monitor for bleeding    # Hypertension: Noted on problem list            # Morbid Obesity: Estimated body mass index is 47.6 kg/m  as calculated from the following:    Height as of this encounter: 1.829 m (6').    Weight as of this encounter: 159.2 kg (350 lb 15.6 oz)., PRESENT ON ADMISSION     # Asthma: noted on problem list              The patient's care was discussed with the Patient .    Michael Ventura MD  Hospitalist Service  [facility]    ______________________________________________________________________    Interval History     Symptoms   Patient did walk today    Review of Systems:   He says the pain in his right hip is much less as compared to when he came in    Data reviewed today: I reviewed all medications, new labs and imaging results over the last 24 hours.     Physical Exam   Vital Signs: Temp: 98.2  F (36.8  C) Temp src:  Temporal BP: 139/52 Pulse: 76   Resp: 16 SpO2: 98 % O2 Device: None (Room air) Oxygen Delivery: 2 LPM  Weight: 350 lbs 15.56 oz      GENERAL: Patient is not in acute distress  HEENT: EOM+,Conjunctiva is clear   NECK:  no Jugular Venous distention  HEART: S1 S2 regular Rate and Rhythm, there is  no murmur,   LUNGS: Respirations are  not laboured, Lungs are  clear to auscultation without Crepitations or Wheezing   ABDOMEN: Soft , there is no tenderness , Bowel Sounds are  Positive   LOWER LIMBS: no Pedal Edema  Bilaterally I did not examine the right hip postoperative site  CNS:  Alert,  Oriented x 3, Moving all the Four Limbs     Data   Recent Labs   Lab 07/22/25  0735 07/21/25  1206 07/21/25  0635 07/20/25  1209 07/20/25  0721 07/19/25  1434 07/19/25  1431   WBC 15.7*  --   --   --  15.1*  --  17.7*   HGB 8.9*  8.9* 9.7*  --   --  9.7*  --  10.2*   MCV 84  84 85  --   --  84  --  85     --   --   --  318  --  344   INR  --  1.20*  --  1.09  --  1.09  --      --   --   --  138  --  138   POTASSIUM 5.1  --   --   --  3.9  --  3.9   CHLORIDE 104  --   --   --  104  --  103   CO2 23  --   --   --  24  --  26   BUN 16.0  --   --   --  12.2  --  11.2   CR 1.16  --   --   --  1.06  --  1.15   ANIONGAP 11  --   --   --  10  --  9   FILEMON 8.7*  --   --   --  9.1  --  9.0   *  --  72  --  119*  --  80   ALBUMIN  --   --   --   --   --   --  3.1*   PROTTOTAL  --   --   --   --   --   --  6.8   BILITOTAL  --   --   --   --   --   --  0.3   ALKPHOS  --   --   --   --   --   --  67   ALT  --   --   --   --   --   --  21   AST  --   --   --   --   --   --  15         No results found for this or any previous visit (from the past 24 hours).

## 2025-07-23 ENCOUNTER — APPOINTMENT (OUTPATIENT)
Dept: PHYSICAL THERAPY | Facility: CLINIC | Age: 45
DRG: 464 | End: 2025-07-23
Payer: COMMERCIAL

## 2025-07-23 ENCOUNTER — APPOINTMENT (OUTPATIENT)
Dept: OCCUPATIONAL THERAPY | Facility: CLINIC | Age: 45
DRG: 464 | End: 2025-07-23
Payer: COMMERCIAL

## 2025-07-23 LAB
BACTERIA ASPIRATE CULT: ABNORMAL
BACTERIA TISS BX CULT: ABNORMAL
BACTERIA TISS BX CULT: ABNORMAL
CREAT SERPL-MCNC: 1.43 MG/DL (ref 0.67–1.17)
CRP SERPL-MCNC: 56.22 MG/L
EGFRCR SERPLBLD CKD-EPI 2021: 62 ML/MIN/1.73M2
HGB BLD-MCNC: 7.6 G/DL (ref 13.3–17.7)
MCV RBC AUTO: 85 FL (ref 78–100)
MCV RBC AUTO: 85 FL (ref 78–100)
VANCOMYCIN SERPL-MCNC: 26.9 UG/ML (ref ?–25)
VANCOMYCIN SERPL-MCNC: 29.8 UG/ML (ref ?–25)
WBC # BLD AUTO: 9 10E3/UL (ref 4–11)

## 2025-07-23 PROCEDURE — 99232 SBSQ HOSP IP/OBS MODERATE 35: CPT | Performed by: INTERNAL MEDICINE

## 2025-07-23 PROCEDURE — 250N000011 HC RX IP 250 OP 636: Performed by: ORTHOPAEDIC SURGERY

## 2025-07-23 PROCEDURE — 36569 INSJ PICC 5 YR+ W/O IMAGING: CPT

## 2025-07-23 PROCEDURE — 120N000001 HC R&B MED SURG/OB

## 2025-07-23 PROCEDURE — 258N000003 HC RX IP 258 OP 636: Performed by: INTERNAL MEDICINE

## 2025-07-23 PROCEDURE — 272N000579 HC TRAY POWER PICC SOLO 4FR SINGLE LUMEN

## 2025-07-23 PROCEDURE — 999N000157 HC STATISTIC RCP TIME EA 10 MIN

## 2025-07-23 PROCEDURE — 250N000011 HC RX IP 250 OP 636: Performed by: INTERNAL MEDICINE

## 2025-07-23 PROCEDURE — 250N000013 HC RX MED GY IP 250 OP 250 PS 637: Performed by: ORTHOPAEDIC SURGERY

## 2025-07-23 PROCEDURE — 97535 SELF CARE MNGMENT TRAINING: CPT | Mod: GO | Performed by: REHABILITATION PRACTITIONER

## 2025-07-23 PROCEDURE — 97530 THERAPEUTIC ACTIVITIES: CPT | Mod: GP

## 2025-07-23 PROCEDURE — 97116 GAIT TRAINING THERAPY: CPT | Mod: GP

## 2025-07-23 PROCEDURE — 82565 ASSAY OF CREATININE: CPT | Performed by: INTERNAL MEDICINE

## 2025-07-23 PROCEDURE — 36415 COLL VENOUS BLD VENIPUNCTURE: CPT | Performed by: INTERNAL MEDICINE

## 2025-07-23 PROCEDURE — 85048 AUTOMATED LEUKOCYTE COUNT: CPT | Performed by: INTERNAL MEDICINE

## 2025-07-23 PROCEDURE — 80202 ASSAY OF VANCOMYCIN: CPT | Performed by: INTERNAL MEDICINE

## 2025-07-23 PROCEDURE — 94660 CPAP INITIATION&MGMT: CPT

## 2025-07-23 PROCEDURE — 85018 HEMOGLOBIN: CPT | Performed by: ORTHOPAEDIC SURGERY

## 2025-07-23 PROCEDURE — 86140 C-REACTIVE PROTEIN: CPT | Performed by: INTERNAL MEDICINE

## 2025-07-23 RX ORDER — LIDOCAINE 40 MG/G
CREAM TOPICAL
Status: DISCONTINUED | OUTPATIENT
Start: 2025-07-23 | End: 2025-07-24

## 2025-07-23 RX ADMIN — OXYBUTYNIN CHLORIDE 30 MG: 5 TABLET, EXTENDED RELEASE ORAL at 08:28

## 2025-07-23 RX ADMIN — HYDROXYZINE HYDROCHLORIDE 25 MG: 25 TABLET, FILM COATED ORAL at 21:51

## 2025-07-23 RX ADMIN — HYDROXYZINE HYDROCHLORIDE 25 MG: 25 TABLET, FILM COATED ORAL at 15:29

## 2025-07-23 RX ADMIN — PANTOPRAZOLE SODIUM 40 MG: 40 TABLET, DELAYED RELEASE ORAL at 08:30

## 2025-07-23 RX ADMIN — OXYCODONE HYDROCHLORIDE 5 MG: 5 TABLET ORAL at 08:45

## 2025-07-23 RX ADMIN — HYDROMORPHONE HYDROCHLORIDE 0.4 MG: 0.2 INJECTION, SOLUTION INTRAMUSCULAR; INTRAVENOUS; SUBCUTANEOUS at 20:00

## 2025-07-23 RX ADMIN — Medication 200 MG: at 08:30

## 2025-07-23 RX ADMIN — PRAZOSIN HYDROCHLORIDE 3 MG: 1 CAPSULE ORAL at 21:27

## 2025-07-23 RX ADMIN — FAMOTIDINE 20 MG: 20 TABLET, FILM COATED ORAL at 19:47

## 2025-07-23 RX ADMIN — OLANZAPINE 15 MG: 10 TABLET, FILM COATED ORAL at 21:27

## 2025-07-23 RX ADMIN — KETOROLAC TROMETHAMINE 30 MG: 30 INJECTION, SOLUTION INTRAMUSCULAR at 18:40

## 2025-07-23 RX ADMIN — SENNOSIDES AND DOCUSATE SODIUM 1 TABLET: 50; 8.6 TABLET ORAL at 19:47

## 2025-07-23 RX ADMIN — MIRABEGRON 50 MG: 50 TABLET, EXTENDED RELEASE ORAL at 08:28

## 2025-07-23 RX ADMIN — DULOXETINE 120 MG: 60 CAPSULE, DELAYED RELEASE ORAL at 08:29

## 2025-07-23 RX ADMIN — FAMOTIDINE 20 MG: 20 TABLET, FILM COATED ORAL at 08:30

## 2025-07-23 RX ADMIN — POLYETHYLENE GLYCOL 3350 17 G: 17 POWDER, FOR SOLUTION ORAL at 08:28

## 2025-07-23 RX ADMIN — ENOXAPARIN SODIUM 40 MG: 40 INJECTION SUBCUTANEOUS at 10:06

## 2025-07-23 RX ADMIN — CHOLECALCIFEROL TAB 10 MCG (400 UNIT) 10 MCG: 10 TAB at 08:28

## 2025-07-23 RX ADMIN — SENNOSIDES AND DOCUSATE SODIUM 1 TABLET: 50; 8.6 TABLET ORAL at 10:08

## 2025-07-23 RX ADMIN — OXYCODONE HYDROCHLORIDE 10 MG: 10 TABLET ORAL at 13:24

## 2025-07-23 RX ADMIN — OXYCODONE HYDROCHLORIDE 10 MG: 10 TABLET ORAL at 21:51

## 2025-07-23 RX ADMIN — ACETAMINOPHEN 975 MG: 325 TABLET ORAL at 08:28

## 2025-07-23 RX ADMIN — FERROUS SULFATE TAB 325 MG (65 MG ELEMENTAL FE) 325 MG: 325 (65 FE) TAB at 08:30

## 2025-07-23 RX ADMIN — ACETAMINOPHEN 975 MG: 325 TABLET ORAL at 15:25

## 2025-07-23 RX ADMIN — ALLOPURINOL 300 MG: 300 TABLET ORAL at 08:30

## 2025-07-23 RX ADMIN — VANCOMYCIN HYDROCHLORIDE 1500 MG: 10 INJECTION, POWDER, LYOPHILIZED, FOR SOLUTION INTRAVENOUS at 04:36

## 2025-07-23 ASSESSMENT — ACTIVITIES OF DAILY LIVING (ADL)
ADLS_ACUITY_SCORE: 42

## 2025-07-23 NOTE — PLAN OF CARE
Goal Outcome Evaluation:      POD2.  Alert, oriented x4. Slow to response at times. Rest between cares.   [place] drain. Moderate drainage from incision dressing   Severe edema to bilateral legs/feet. Mobility improving. Dry skin on the heels; feet care performed. R foot  redness, blanchable. No numbness. R hip incision.  Had BM meds. Eat well. Voids, urinal. Hbg 7.6. WBC 9.0. CRP 56.22. Tested positive for MRSA, contact precautions. Vancomycin. Critical lab value Vancomycin trough 29.8.....26.9. Pharmacy following Creatinine 1.43. Pleasant. In agreement of care. Plan to discharge with PICC line         Patient vital signs are at baseline: Yes    Patient able to ambulate as they were prior to admission or with assist devices provided by therapies during their stay:  yes- g/b + walker; assist of 1. Improved mobility.      Patient MUST void prior to discharge:  Yes, urinal      Patient able to tolerate oral intake:  Yes     Pain has adequate pain control using Oral analgesics:  Yes.    Does patient have an identified :  Yes, guardian    Has goal D/C date and time been discussed with patient:  Yes. Plan is PICC line and antibiotics for 6 weeks. Vanco being held due to critical trough. Awaiting cultures. ID following.       Plan of Care Reviewed With: patient

## 2025-07-23 NOTE — PLAN OF CARE
"Goal Outcome Evaluation:      Plan of Care Reviewed With: patient    Overall Patient Progress: no changeOverall Patient Progress: no change    Outcome Evaluation: VSS on RA, CPAP @ night. On contact for MRSA. Ax1 GB and walker. PAOLA drain in place, scant drainage on dressing. Vancomycin and rocephin abx. Saline locked. Voiding w/o difficulty. Pain managed with IV toradol and oxycodone. Discharge when medically ready.    Problem: Adult Inpatient Plan of Care  Goal: Plan of Care Review  Description: The Plan of Care Review/Shift note should be completed every shift.  The Outcome Evaluation is a brief statement about your assessment that the patient is improving, declining, or no change.  This information will be displayed automatically on your shift  note.  Outcome: Progressing  Flowsheets (Taken 7/23/2025 0603)  Outcome Evaluation: VSS on RA, CPAP @ night. On contact for MRSA. Ax1 GB and walker. PAOLA drain in place, scant drainage on dressing. Vancomycin and rocephin abx. Saline locked. Voiding w/o difficulty. Pain managed with IV toradol and oxycodone. Discharge when medically ready.  Plan of Care Reviewed With: patient  Overall Patient Progress: no change  Goal: Patient-Specific Goal (Individualized)  Description: You can add care plan individualizations to a care plan. Examples of Individualization might be:  \"Parent requests to be called daily at 9am for status\", \"I have a hard time hearing out of my right ear\", or \"Do not touch me to wake me up as it startles  me\".  Outcome: Progressing  Goal: Absence of Hospital-Acquired Illness or Injury  Outcome: Progressing  Intervention: Identify and Manage Fall Risk  Recent Flowsheet Documentation  Taken 7/22/2025 2100 by Taylor Mills, RN  Safety Promotion/Fall Prevention:   safety round/check completed   activity supervised   assistive device/personal items within reach   mobility aid in reach   nonskid shoes/slippers when out of bed   lighting adjusted   room near " nurse's station  Intervention: Prevent Skin Injury  Recent Flowsheet Documentation  Taken 7/22/2025 2200 by Taylor Mills RN  Body Position: supine, head elevated  Intervention: Prevent and Manage VTE (Venous Thromboembolism) Risk  Recent Flowsheet Documentation  Taken 7/22/2025 2100 by Taylor Mills RN  VTE Prevention/Management: SCDs on (sequential compression devices)  Intervention: Prevent Infection  Recent Flowsheet Documentation  Taken 7/22/2025 2100 by Taylor Mills RN  Infection Prevention:   rest/sleep promoted   single patient room provided  Goal: Optimal Comfort and Wellbeing  Outcome: Progressing  Intervention: Monitor Pain and Promote Comfort  Recent Flowsheet Documentation  Taken 7/22/2025 2335 by Taylor Mills RN  Pain Management Interventions: medication (see MAR)  Goal: Readiness for Transition of Care  Outcome: Progressing     Problem: Infection  Goal: Absence of Infection Signs and Symptoms  Outcome: Progressing     Problem: Pain Acute  Goal: Optimal Pain Control and Function  Outcome: Progressing  Intervention: Develop Pain Management Plan  Recent Flowsheet Documentation  Taken 7/22/2025 2335 by Taylor Mills RN  Pain Management Interventions: medication (see MAR)  Intervention: Prevent or Manage Pain  Recent Flowsheet Documentation  Taken 7/22/2025 2100 by Taylor Mills RN  Medication Review/Management: medications reviewed

## 2025-07-23 NOTE — PROGRESS NOTES
[facility]    Infectious Disease Progress Note    Date of Service (when I saw the patient): 07/23/2025     Assessment & Plan   Kimo Greenwood is a 44 year old male who was admitted on 7/19/2025.     Impression:  43 yo male with history of a right total hip arthroplasty in April 2025.    Approx 10 days ago he fell and began developing right hip pain and swelling.    He was seen in urgent care and ER visits on multiple occasions and eventually was placed on oral antibiotics.    With continued pain and swelling presented to the emergency room 2 days ago and was admitted for possibly infected right hip wound.    S/p I and D and combined, with revision of head and acetabular liner   OR culture with staph aureus pending OSMAN   MRSA PCR Positive      Recommendations   Vanco level are high, repeat level, hold vanco for now appreciate pharmacy helping   Stop ceftriaxone   Follow up on the susceptibilities on the SA in the OR culture  Fortunately the blood culture are clear     Anticipate need for IV antibiotics for 6 weeks followed by oral antibiotics for 3 months based on culture   Add rifampin ( 3 months) before discharge     Harley Drake MD    Interval History   Tolerating antibiotics ok   No new rashes or issues with antibiotics   Labs reviewed   No changes to past medical, social or family history   Feels ok   Vanco levels high vanco being held      Physical Exam   Temp: 97.4  F (36.3  C) Temp src: Temporal BP: (!) 162/77 Pulse: 66   Resp: 20 SpO2: 95 % O2 Device: None (Room air)    Vitals:    07/19/25 1822   Weight: (!) 159.2 kg (350 lb 15.6 oz)     Vital Signs with Ranges  Temp:  [96.5  F (35.8  C)-98.2  F (36.8  C)] 97.4  F (36.3  C)  Pulse:  [66-77] 66  Resp:  [16-20] 20  BP: (119-162)/(44-77) 162/77  SpO2:  [95 %-98 %] 95 %    Constitutional: Awake, alert, cooperative, no apparent distress  Lungs: Clear to auscultation bilaterally, no crackles or wheezing  Cardiovascular: Regular rate and  rhythm, normal S1 and S2, and no murmur noted  Abdomen: Normal bowel sounds, soft, non-distended, non-tender  Skin: No rashes, no cyanosis, no edema  Other:    Medications   Current Facility-Administered Medications   Medication Dose Route Frequency Provider Last Rate Last Admin    lactated ringers infusion   Intravenous Continuous Bala Randall MD   Stopped at 07/22/25 0532    [Held by provider] lactated ringers infusion   Intravenous Continuous Shahab Fang MD   Stopped at 07/19/25 1846     Current Facility-Administered Medications   Medication Dose Route Frequency Provider Last Rate Last Admin    acetaminophen (TYLENOL) tablet 975 mg  975 mg Oral Q8H Bala Randall MD   975 mg at 07/23/25 0828    allopurinol (ZYLOPRIM) tablet 300 mg  300 mg Oral Daily Bala Randall MD   300 mg at 07/23/25 0830    cholecalciferol (VITAMIN D3) tablet 10 mcg  10 mcg Oral Daily Bala Randall MD   10 mcg at 07/23/25 0828    DULoxetine (CYMBALTA) DR capsule 120 mg  120 mg Oral Daily Bala Randall MD   120 mg at 07/23/25 0829    enoxaparin ANTICOAGULANT (LOVENOX) injection 40 mg  40 mg Subcutaneous Q12H Bala Randall MD   40 mg at 07/23/25 1006    famotidine (PEPCID) tablet 20 mg  20 mg Oral BID Bala Randall MD   20 mg at 07/23/25 0830    Or    famotidine (PEPCID) injection 20 mg  20 mg Intravenous BID Bala Randall MD        ferrous sulfate (FEROSUL) tablet 325 mg  325 mg Oral Daily with breakfast Bala Randall MD   325 mg at 07/23/25 0830    hydrocortisone 2.5 % cream   Topical BID Bala Randall MD   Given at 07/22/25 0815    magnesium oxide (MAG-OX) half-tab 200 mg  200 mg Oral Daily Bala Randall MD   200 mg at 07/23/25 0830    mirabegron (MYRBETRIQ) 24 hr tablet 50 mg  50 mg Oral Daily Bala Randall MD   50 mg at 07/23/25 0828    OLANZapine (zyPREXA) tablet 15 mg  15 mg Oral At  Bedtime Bala Randall MD   15 mg at 07/22/25 2246    oxyBUTYnin ER (DITROPAN XL) 24 hr tablet 30 mg  30 mg Oral Daily Bala Randall MD   30 mg at 07/23/25 0828    pantoprazole (PROTONIX) EC tablet 40 mg  40 mg Oral QAM AC Bala Randall MD   40 mg at 07/23/25 0830    polyethylene glycol (MIRALAX) Packet 17 g  17 g Oral Daily Bala Randall MD   17 g at 07/23/25 0828    prazosin (MINIPRESS) capsule 3 mg  3 mg Oral At Bedtime Bala Randall MD   3 mg at 07/22/25 2245    senna-docusate (SENOKOT-S/PERICOLACE) 8.6-50 MG per tablet 1 tablet  1 tablet Oral BID Bala Randall MD   1 tablet at 07/23/25 1008    sodium chloride (PF) 0.9% PF flush 3 mL  3 mL Intracatheter Q8H Bala Randall MD   3 mL at 07/22/25 2120    sodium chloride (PF) 0.9% PF flush 3 mL  3 mL Intracatheter Q8H [facility] Bala Randall MD   3 mL at 07/23/25 0829    tranexamic acid 1 g in 100 mL NS IV bag (premix)  1 g Intravenous Once Bala Randall MD        vancomycin (VANCOCIN) 1,500 mg in 0.9% NaCl 250 mL intermittent infusion  1,500 mg Intravenous Q12H Shahab Fang  mL/hr at 07/22/25 1524 1,500 mg at 07/23/25 0436       Data   All microbiology laboratory data reviewed.  Recent Labs   Lab Test 07/23/25  0552 07/22/25  0735 07/21/25  1206 07/20/25  0721 07/19/25  1431   WBC 9.0 15.7*  --  15.1* 17.7*   HGB 7.6* 8.9*  8.9* 9.7* 9.7* 10.2*   HCT  --  25.7*  --  29.4* 31.0*   MCV 85  85 84  84 85 84 85   PLT  --  326  --  318 344     Recent Labs   Lab Test 07/22/25  0735 07/20/25  0721 07/19/25  1431   CR 1.16 1.06 1.15     Recent Labs   Lab Test 07/19/25  1431   SED 76*     Recent Labs   Lab Test 06/24/19  0844 06/24/19  0843   CULT No anaerobes isolated  No anaerobes isolated  No growth  No growth No anaerobes isolated  No growth       All cultures:  Recent Labs   Lab 07/21/25  0930 07/21/25  0929 07/21/25  0854 07/19/25  1615  07/19/25  1432   CULTURE Culture in progress  No anaerobic organisms isolated after 1 day  See corresponding culture for results 1+ Staphylococcus aureus*  1+ Staphylococcus aureus*  Culture in progress  1+ Staphylococcus aureus*  No anaerobic organisms isolated after 1 day  No anaerobic organisms isolated after 1 day  No anaerobic organisms isolated after 1 day  See corresponding culture for results  See corresponding culture for results  See corresponding culture for results 2+ Staphylococcus aureus*  No anaerobic organisms isolated after 1 day  See corresponding culture for results No growth after 3 days No growth after 3 days      Blood culture:  Results for orders placed or performed during the hospital encounter of 07/19/25   Blood Culture Peripheral blood (BC) Hand, Left    Collection Time: 07/19/25  4:15 PM    Specimen: Hand, Left; Peripheral blood (BC)   Result Value Ref Range    Culture No growth after 3 days    Blood Culture Peripheral blood (BC) Arm, Right    Collection Time: 07/19/25  2:32 PM    Specimen: Arm, Right; Peripheral blood (BC)   Result Value Ref Range    Culture No growth after 3 days    Results for orders placed or performed during the hospital encounter of 05/23/25   Blood Culture Peripheral blood (BC) Arm, Right    Collection Time: 05/23/25  9:18 PM    Specimen: Arm, Right; Peripheral blood (BC)   Result Value Ref Range    Culture No Growth      *Note: Due to a large number of results and/or encounters for the requested time period, some results have not been displayed. A complete set of results can be found in Results Review.      Urine culture:  No results found. However, due to the size of the patient record, not all encounters were searched. Please check Results Review for a complete set of results.

## 2025-07-23 NOTE — PROGRESS NOTES
Hospitalist Medicine Progress Note   [facility]       Kimo Greenwood is a 44 year old gentleman with asthma, Patel's esophagus, GERD, hypertension, depression, mild mental retardation, chronic kidney disease, gout, morbid obesity, obstructive sleep apnea, overactive bladder, iron deficiency anemia and right total hip arthroplasty done in April 2025 who came into [facility] 7/19/2025 with fall and symptoms of infection of the right hip associated with progressively worsening edema redness and tenderness of the right hip to a point that he was unable to bear weight on it.  He was seen at [facility] urgent care on 7/11/2025 and started on doxycycline and then later again put on Augmentin 7/16/2025.  I did mention CRP was 130 WBC 17.7 hemoglobin 10.2 lactic acid was normal at 0.8 on 7/21/2025 he had incision and drainage of the right hip with revision of the head and acetabular liner by Dr. Bala Randall MD aspirate from the right hip grew Staph aureus he is on ceftriaxone 2 g daily which has since been discontinued and the patient is on vancomycin since 7/21/2025.  PICC line is being placed for IV antibiotic treatment which is being planned for 6 weeks followed by 3 more months of oral antibiotic therapy as advised by ID.        Date of Admission:  7/19/2025  Assessment & Plan     Surgical site infection  S/p right total hip arthroplasty in April 2025  S/p incision and drainage of the right hip by MD Johann 7/21/2025  On treatment with ceftriaxone 2 g daily  Aspirate from the right hip is growing Staph aureus  I will check MRSA in the nose and until then continue the patient on vancomycin  Infectious diseases would like to continue 6 weeks of IV antibiotic therapy followed by 3 months of oral antibiotic therapy  CRP is decreasing  PICC line will be placed today  Awaiting discharge destination by  recommendation  And okay from surgery for discharge     #History  of mild mental retardation  -Patient mentioned that he lives independently at his own apartment with a close follow-up and visit from   -Social service following with us.  - Patient has a listed guardian     #History of bronchial asthma  -Not in exacerbation     #History of Patel's esophagus  #History of GERD  -Continued on home regimen of PPI     #History of benign essential hypertension   -Home regimen of prazosin was resumed     #History of CKD  -Appears to be at baseline and continue to monitor    Gout, hyperuricacidemia   Recently treated for gout with colchicine, steroid burst.  Should be on allopurinol so will resume.  Patient had not been taking at home     #History of depression  - On Cymbalta and Zyprexa     #History of morbidly obese  #History of LOREN    Plan:   CRP in a.m.  PICC placement  Awaiting wound culture results which are growing Staph aureus but sensitivities is not back as yet      Diet: Advance Diet as Tolerated: Regular Diet Adult  Diet    DVT Prophylaxis: Enoxaparin (Lovenox) SQ  Germain Catheter: Not present  Code Status: Full Code         Medically Ready for Discharge: Anticipated in 2-4 Days    Clinically Significant Risk Factors               # Hypoalbuminemia: Lowest albumin = 3.1 g/dL at 7/19/2025  2:31 PM, will monitor as appropriate    # Coagulation Defect: INR = 1.20 (Ref range: 0.85 - 1.15) and/or PTT = 35 Seconds (Ref range: 22 - 38 Seconds), will monitor for bleeding    # Hypertension: Noted on problem list            # Morbid Obesity: Estimated body mass index is 47.6 kg/m  as calculated from the following:    Height as of this encounter: 1.829 m (6').    Weight as of this encounter: 159.2 kg (350 lb 15.6 oz)., PRESENT ON ADMISSION     # Asthma: noted on problem list              The patient's care was discussed with the Patient .    Michael Ventura MD  Hospitalist Service  [facility]  Hospital    ______________________________________________________________________    Interval History     Symptoms   No new symptoms    Review of Systems:   He says the pain in his right hip is much less as compared to when he came in    Data reviewed today: I reviewed all medications, new labs and imaging results over the last 24 hours.     Physical Exam   Vital Signs: Temp: 97.4  F (36.3  C) Temp src: Temporal BP: (!) 162/77 Pulse: 66   Resp: 20 SpO2: 95 % O2 Device: None (Room air)    Weight: 350 lbs 15.56 oz      GENERAL: Patient is not in acute distress  HEENT: EOM+,Conjunctiva is clear   NECK:  no Jugular Venous distention  HEART: S1 S2 regular Rate and Rhythm, there is  no murmur,   LUNGS: Respirations are  not laboured, Lungs are  clear to auscultation without Crepitations or Wheezing   ABDOMEN: Soft , there is no tenderness , Bowel Sounds are  Positive   LOWER LIMBS: no Pedal Edema  Bilaterally I did not examine the right hip postoperative site  CNS:  Alert,  Oriented x 3, Moving all the Four Limbs     Data   Recent Labs   Lab 07/23/25  0552 07/22/25  0735 07/21/25  1206 07/21/25  0635 07/20/25  1209 07/20/25  0721 07/19/25  1434 07/19/25  1431   WBC 9.0 15.7*  --   --   --  15.1*  --  17.7*   HGB 7.6* 8.9*  8.9* 9.7*  --   --  9.7*  --  10.2*   MCV 85  85 84  84 85  --   --  84  --  85   PLT  --  326  --   --   --  318  --  344   INR  --   --  1.20*  --  1.09  --  1.09  --    NA  --  138  --   --   --  138  --  138   POTASSIUM  --  5.1  --   --   --  3.9  --  3.9   CHLORIDE  --  104  --   --   --  104  --  103   CO2  --  23  --   --   --  24  --  26   BUN  --  16.0  --   --   --  12.2  --  11.2   CR  --  1.16  --   --   --  1.06  --  1.15   ANIONGAP  --  11  --   --   --  10  --  9   FILEMON  --  8.7*  --   --   --  9.1  --  9.0   GLC  --  111*  --  72  --  119*  --  80   ALBUMIN  --   --   --   --   --   --   --  3.1*   PROTTOTAL  --   --   --   --   --   --   --  6.8   BILITOTAL  --   --   --   --   --    --   --  0.3   ALKPHOS  --   --   --   --   --   --   --  67   ALT  --   --   --   --   --   --   --  21   AST  --   --   --   --   --   --   --  15         No results found for this or any previous visit (from the past 24 hours).

## 2025-07-23 NOTE — PROGRESS NOTES
[facility]    Received request for benefit check should pt require home IV abx. [place] has coverage for IV abx through their [employer] Enhanced plan. The pt has no deductible nor OOP max; insurance covers at 100%.    Thank you     Iris Lea RN  [facility] Liaison  [phone] (Mon thru Fri 8am - 5pm)  [phone] Office

## 2025-07-23 NOTE — PROGRESS NOTES
Orthopedic Surgery  7/23/2025  POD#: 2    S: Patient voices no complaints today.     O: Blood pressure (!) 162/77, pulse 66, temperature 97.4  F (36.3  C), temperature source Temporal, resp. rate 20, height 1.829 m (6'), weight (!) 159.2 kg (350 lb 15.6 oz), SpO2 95%.  Lab Results   Component Value Date    HGB 7.6 07/23/2025    HGB 13.4 10/26/2020     Lab Results   Component Value Date    INR 1.20 07/21/2025    INR 0.92 01/09/2017        I/O last 3 completed shifts:  In: 1430 [P.O.:1080; IV Piggyback:350]  Out: 1850 [Urine:1850]    Neurovascularly intact.  Calves are negative bilaterally, both soft and nontender.  The wound is C/D/I. PAOLA intact to negative pressure with bloody drainage proximally.  The wound looks good with minimal erythema of the surrounding skin.    A: Mr. Greenwood is doing well status post Procedure(s):  Incision and drainage hip, combined, with revision of head and acetabular liner.    P:   1. Mobilize and continue physical therapy. No hip precautions.  2. Anticoagulation - discharge on ASA  3. 3. Septic R MARGO - s/p I&D 7/21 with head and liner exchange. ID following, appreciate abx recs; likely 6 weeks of abx via PICC line, waiting on surgical cxs susceptibility of staph aureus. Hopefully cleared for PICC line today, as blood cxs negative  4. Pain management - controlled  5. Anticipate discharge to home after PICC placed and abx plan arranged by ID team.      Judy Barragan PA-C  [facility]  O: [phone]

## 2025-07-23 NOTE — PROCEDURES
[facility]    Single Lumen PICC Placement    Date/Time: 7/23/2025 5:24 PM    Performed by: Harshal العلي RN  Authorized by: Michael Ventura MD  Indications: vascular access      UNIVERSAL PROTOCOL   Site Marked: Yes  Prior Images Obtained and Reviewed:  Yes  Required items: Required blood products, implants, devices and special equipment available    Patient identity confirmed:  Verbally with patient, arm band and hospital-assigned identification number  NA - No sedation, light sedation, or local anesthesia  Confirmation Checklist:  Patient's identity using two indicators, relevant allergies, procedure was appropriate and matched the consent or emergent situation and correct equipment/implants were available  Time out: Immediately prior to the procedure a time out was called    Universal Protocol: the Joint Commission Universal Protocol was followed    Preparation: Patient was prepped and draped in usual sterile fashion       ANESTHESIA    Anesthesia:  Local infiltration  Local Anesthetic:  Lidocaine 1% without epinephrine  Anesthetic Total (mL):  0.5      SEDATION    Patient Sedated: No        Preparation: skin prepped with ChloraPrep  Skin prep agent: skin prep agent completely dried prior to procedure  Sterile barriers: maximum sterile barriers were used: cap, mask, sterile gown, sterile gloves, and large sterile sheet  Hand hygiene: hand hygiene performed prior to central venous catheter insertion  Type of line used: PICC  Catheter type: single lumen  Lumen type: power PICC and valved  Catheter size: 4 Fr  Brand: Bard  Lot number: [id number]  Placement method: venipuncture, MST, ultrasound and tip navigation system  Number of attempts: 1  Difficulty threading catheter: no  Successful placement: yes  Orientation: right  Catheter to Vein (%): 28  Location: cephalic vein (4.6mm)  Tip Location: SVC/RA Junction  Arm circumference: adults 10 cm  Extremity circumference: 42  Visible catheter length:  "2  Total catheter length: 53  Dressing and securement: alcohol impregnated caps, blood cleaned with CHG, blood removed, sterile dressing applied, subcutaneous anchor securement system, thrombin hemostasis patch applied, site cleansed and securement device  Post procedure assessment: blood return through all ports, free fluid flow and placement verified by 3CG technology  PROCEDURE      PICC ok to use, verified with 3cg Tip Confirmation, Erin LIVE RN Informed    This patient's catheter is secured with SecurAcath instead of a traditional suture or adhesive based securement. This is a subcutaneous anchor securement system (aka SHELTON or SecurAcath) that holds the catheter just below the skin with nitinol feet and a friction fit  around the catheter.  It can stay with the catheter until the catheter is removed.    Do not open, change or remove the SecurAcath.  SecurAcath may be disinfected during a dressing change, but do not open or remove it.  SecurAcath acts like a hinge - lift, clean 360 degrees around, let dry and apply new dressing.  SecurAcath is compatible with all dressings and antiseptic agents.  Ensure the wings of the catheter and SecurAcath are completely under the boarder of the dressing.  SecurAcath is MRI safe to 3T, see IFU for details.  A Statlock is not necessary when SecurAcath is present.   Patients can go to MRI with SecurAcath device in place  When the catheter is indicated for removal, enter \"Nursing to Consult for Vascular Access Care\" order in [facility], watch the removal video on [employer], or call for training if you have not removed before. 24 hour SecurAcath Clinical Education and Support  [phone]    Disposal: sharps and needle count correct at the end of procedure, needles and guidewire disposed in sharps container  Patient Tolerance:  Patient tolerated the procedure well with no immediate complications        "

## 2025-07-24 ENCOUNTER — APPOINTMENT (OUTPATIENT)
Dept: OCCUPATIONAL THERAPY | Facility: CLINIC | Age: 45
DRG: 464 | End: 2025-07-24
Payer: COMMERCIAL

## 2025-07-24 ENCOUNTER — APPOINTMENT (OUTPATIENT)
Dept: PHYSICAL THERAPY | Facility: CLINIC | Age: 45
DRG: 464 | End: 2025-07-24
Payer: COMMERCIAL

## 2025-07-24 LAB
BACTERIA ASPIRATE CULT: NORMAL
BACTERIA SPEC CULT: NO GROWTH
BACTERIA SPEC CULT: NO GROWTH
BACTERIA SPEC CULT: NORMAL
BACTERIA TISS BX CULT: ABNORMAL
BACTERIA TISS BX CULT: NORMAL
CREAT SERPL-MCNC: 1.41 MG/DL (ref 0.67–1.17)
EGFRCR SERPLBLD CKD-EPI 2021: 63 ML/MIN/1.73M2
MCV RBC AUTO: 86 FL (ref 78–100)
PLATELET # BLD AUTO: 319 10E3/UL (ref 150–450)
VANCOMYCIN SERPL-MCNC: 18.1 UG/ML (ref ?–25)

## 2025-07-24 PROCEDURE — 80202 ASSAY OF VANCOMYCIN: CPT | Performed by: INTERNAL MEDICINE

## 2025-07-24 PROCEDURE — 97535 SELF CARE MNGMENT TRAINING: CPT | Mod: GO | Performed by: REHABILITATION PRACTITIONER

## 2025-07-24 PROCEDURE — 250N000011 HC RX IP 250 OP 636: Performed by: ORTHOPAEDIC SURGERY

## 2025-07-24 PROCEDURE — 120N000001 HC R&B MED SURG/OB

## 2025-07-24 PROCEDURE — 97110 THERAPEUTIC EXERCISES: CPT | Mod: GP | Performed by: PHYSICAL THERAPIST

## 2025-07-24 PROCEDURE — 250N000013 HC RX MED GY IP 250 OP 250 PS 637: Performed by: ORTHOPAEDIC SURGERY

## 2025-07-24 PROCEDURE — 97530 THERAPEUTIC ACTIVITIES: CPT | Mod: GO | Performed by: REHABILITATION PRACTITIONER

## 2025-07-24 PROCEDURE — 82565 ASSAY OF CREATININE: CPT | Performed by: ORTHOPAEDIC SURGERY

## 2025-07-24 PROCEDURE — 258N000003 HC RX IP 258 OP 636: Performed by: INTERNAL MEDICINE

## 2025-07-24 PROCEDURE — 250N000011 HC RX IP 250 OP 636: Performed by: INTERNAL MEDICINE

## 2025-07-24 PROCEDURE — 85049 AUTOMATED PLATELET COUNT: CPT | Performed by: ORTHOPAEDIC SURGERY

## 2025-07-24 PROCEDURE — 99232 SBSQ HOSP IP/OBS MODERATE 35: CPT | Performed by: INTERNAL MEDICINE

## 2025-07-24 PROCEDURE — 97116 GAIT TRAINING THERAPY: CPT | Mod: GP | Performed by: PHYSICAL THERAPIST

## 2025-07-24 RX ADMIN — Medication 200 MG: at 08:56

## 2025-07-24 RX ADMIN — OXYBUTYNIN CHLORIDE 30 MG: 5 TABLET, EXTENDED RELEASE ORAL at 08:56

## 2025-07-24 RX ADMIN — CHOLECALCIFEROL TAB 10 MCG (400 UNIT) 10 MCG: 10 TAB at 08:56

## 2025-07-24 RX ADMIN — KETOROLAC TROMETHAMINE 30 MG: 30 INJECTION, SOLUTION INTRAMUSCULAR at 17:42

## 2025-07-24 RX ADMIN — FERROUS SULFATE TAB 325 MG (65 MG ELEMENTAL FE) 325 MG: 325 (65 FE) TAB at 08:56

## 2025-07-24 RX ADMIN — KETOROLAC TROMETHAMINE 30 MG: 30 INJECTION, SOLUTION INTRAMUSCULAR at 09:11

## 2025-07-24 RX ADMIN — SENNOSIDES AND DOCUSATE SODIUM 1 TABLET: 50; 8.6 TABLET ORAL at 08:56

## 2025-07-24 RX ADMIN — FAMOTIDINE 20 MG: 20 TABLET, FILM COATED ORAL at 19:57

## 2025-07-24 RX ADMIN — ALLOPURINOL 300 MG: 300 TABLET ORAL at 08:56

## 2025-07-24 RX ADMIN — FAMOTIDINE 20 MG: 20 TABLET, FILM COATED ORAL at 08:56

## 2025-07-24 RX ADMIN — DULOXETINE 120 MG: 60 CAPSULE, DELAYED RELEASE ORAL at 08:55

## 2025-07-24 RX ADMIN — PANTOPRAZOLE SODIUM 40 MG: 40 TABLET, DELAYED RELEASE ORAL at 08:56

## 2025-07-24 RX ADMIN — OLANZAPINE 15 MG: 10 TABLET, FILM COATED ORAL at 21:18

## 2025-07-24 RX ADMIN — ENOXAPARIN SODIUM 40 MG: 40 INJECTION SUBCUTANEOUS at 09:01

## 2025-07-24 RX ADMIN — OXYCODONE HYDROCHLORIDE 10 MG: 10 TABLET ORAL at 12:58

## 2025-07-24 RX ADMIN — HYDROXYZINE HYDROCHLORIDE 25 MG: 25 TABLET, FILM COATED ORAL at 20:00

## 2025-07-24 RX ADMIN — PRAZOSIN HYDROCHLORIDE 3 MG: 1 CAPSULE ORAL at 21:18

## 2025-07-24 RX ADMIN — OXYCODONE HYDROCHLORIDE 10 MG: 10 TABLET ORAL at 21:18

## 2025-07-24 RX ADMIN — MIRABEGRON 50 MG: 50 TABLET, EXTENDED RELEASE ORAL at 08:56

## 2025-07-24 RX ADMIN — POLYETHYLENE GLYCOL 3350 17 G: 17 POWDER, FOR SOLUTION ORAL at 08:56

## 2025-07-24 RX ADMIN — ACETAMINOPHEN 975 MG: 325 TABLET ORAL at 00:24

## 2025-07-24 RX ADMIN — VANCOMYCIN HYDROCHLORIDE 1500 MG: 10 INJECTION, POWDER, LYOPHILIZED, FOR SOLUTION INTRAVENOUS at 09:37

## 2025-07-24 RX ADMIN — ENOXAPARIN SODIUM 40 MG: 40 INJECTION SUBCUTANEOUS at 19:57

## 2025-07-24 RX ADMIN — HYDROMORPHONE HYDROCHLORIDE 0.4 MG: 0.2 INJECTION, SOLUTION INTRAMUSCULAR; INTRAVENOUS; SUBCUTANEOUS at 00:38

## 2025-07-24 ASSESSMENT — ACTIVITIES OF DAILY LIVING (ADL)
ADLS_ACUITY_SCORE: 42
ADLS_ACUITY_SCORE: 41
ADLS_ACUITY_SCORE: 42
ADLS_ACUITY_SCORE: 41
ADLS_ACUITY_SCORE: 42
ADLS_ACUITY_SCORE: 41
ADLS_ACUITY_SCORE: 41
ADLS_ACUITY_SCORE: 42

## 2025-07-24 NOTE — PLAN OF CARE
"Pt. Is AXO4 VSS on RA and uses CPAP at night. Dressing on right hip is saturated, and dressing was reinforced. (Ortho notified and will see in the morning). Pain managed with scheduled Tylenol and IV Dilaudid. Bedside urinal with good output. Vanco held pending lab results. PICC in right arm is intact with old drainage. Continue POC.    Goal Outcome Evaluation:      Plan of Care Reviewed With: patient    Overall Patient Progress: improvingOverall Patient Progress: improving    Outcome Evaluation: CPAP, contact precautions maintained,      Problem: Adult Inpatient Plan of Care  Goal: Plan of Care Review  Description: The Plan of Care Review/Shift note should be completed every shift.  The Outcome Evaluation is a brief statement about your assessment that the patient is improving, declining, or no change.  This information will be displayed automatically on your shift  note.  Outcome: Progressing  Flowsheets (Taken 7/24/2025 0112)  Outcome Evaluation: CPAP, contact precautions maintained,  Plan of Care Reviewed With: patient  Overall Patient Progress: improving  Goal: Patient-Specific Goal (Individualized)  Description: You can add care plan individualizations to a care plan. Examples of Individualization might be:  \"Parent requests to be called daily at 9am for status\", \"I have a hard time hearing out of my right ear\", or \"Do not touch me to wake me up as it startles  me\".  Outcome: Progressing  Goal: Absence of Hospital-Acquired Illness or Injury  Outcome: Progressing  Intervention: Identify and Manage Fall Risk  Recent Flowsheet Documentation  Taken 7/24/2025 0029 by Niurka Orta RN  Safety Promotion/Fall Prevention:   activity supervised   assistive device/personal items within reach   clutter free environment maintained   room door open   room near nurse's station  Goal: Optimal Comfort and Wellbeing  Outcome: Progressing  Goal: Readiness for Transition of Care  Outcome: Progressing     Problem: Infection  Goal: " Absence of Infection Signs and Symptoms  Outcome: Progressing  Intervention: Prevent or Manage Infection  Recent Flowsheet Documentation  Taken 7/24/2025 0029 by Niurka Orta, RN  Isolation Precautions: contact precautions maintained     Problem: Pain Acute  Goal: Optimal Pain Control and Function  Outcome: Progressing  Intervention: Prevent or Manage Pain  Recent Flowsheet Documentation  Taken 7/24/2025 0029 by Niurka Orta, RN  Medication Review/Management: medications reviewed

## 2025-07-24 NOTE — PLAN OF CARE
"Goal Outcome Evaluation:      Plan of Care Reviewed With: patient    Overall Patient Progress: improvingOverall Patient Progress: improving    Outcome Evaluation: VSS. On RA. A&Ox4. Assist of 1 GB/W. Tolerating regular diet well. PICC has good blood return and SL. Contact precautions maintained. Dressing is CDI. Voiding via urinal. IV vancomycin. Prevena in place. Pain managed with scheduled tylenol and PRN toradol and oxycodone. Plan is discharge back to home with home infusion for x6 weeks of antibiotics. SW following.      Problem: Adult Inpatient Plan of Care  Goal: Plan of Care Review  Description: The Plan of Care Review/Shift note should be completed every shift.  The Outcome Evaluation is a brief statement about your assessment that the patient is improving, declining, or no change.  This information will be displayed automatically on your shift  note.  Outcome: Progressing  Flowsheets (Taken 7/24/2025 1041)  Outcome Evaluation: VSS. On RA. A&Ox4. Assist of 1 GB/W. Tolerating regular diet well. PICC has good blood return and SL. Contact precautions maintained. Dressing is CDI. Voiding via urinal. IV vancomycin. PAOLA drain in place. Pain managed with scheduled tylenol and PRN toradol. Plan is discharge back to home with home infusion for x6 weeks of antibiotics. SW following.  Plan of Care Reviewed With: patient  Overall Patient Progress: improving  Goal: Patient-Specific Goal (Individualized)  Description: You can add care plan individualizations to a care plan. Examples of Individualization might be:  \"Parent requests to be called daily at 9am for status\", \"I have a hard time hearing out of my right ear\", or \"Do not touch me to wake me up as it startles  me\".  Outcome: Progressing  Goal: Absence of Hospital-Acquired Illness or Injury  Outcome: Progressing  Intervention: Identify and Manage Fall Risk  Recent Flowsheet Documentation  Taken 7/24/2025 0895 by Dayday Mendez RN  Safety Promotion/Fall Prevention:   " activity supervised   assistive device/personal items within reach   clutter free environment maintained   mobility aid in reach   nonskid shoes/slippers when out of bed   patient and family education   room organization consistent   safety round/check completed   supervised activity   room near nurse's station  Intervention: Prevent Skin Injury  Recent Flowsheet Documentation  Taken 7/24/2025 0846 by Dayday Mendez RN  Body Position: position changed independently  Intervention: Prevent and Manage VTE (Venous Thromboembolism) Risk  Recent Flowsheet Documentation  Taken 7/24/2025 0846 by Dayday Mendez RN  VTE Prevention/Management:   SCDs off (sequential compression devices)   patient refused intervention  Intervention: Prevent Infection  Recent Flowsheet Documentation  Taken 7/24/2025 0846 by Dayday Mendez RN  Infection Prevention:   hand hygiene promoted   rest/sleep promoted   single patient room provided  Goal: Optimal Comfort and Wellbeing  Outcome: Progressing  Intervention: Monitor Pain and Promote Comfort  Recent Flowsheet Documentation  Taken 7/24/2025 0904 by Dayday Mendez RN  Pain Management Interventions: medication (see MAR)  Taken 7/24/2025 0846 by Dayday Mendez RN  Pain Management Interventions:   declines   care clustered  Goal: Readiness for Transition of Care  Outcome: Progressing     Problem: Infection  Goal: Absence of Infection Signs and Symptoms  Outcome: Progressing  Intervention: Prevent or Manage Infection  Recent Flowsheet Documentation  Taken 7/24/2025 0846 by Dayday Mendez RN  Isolation Precautions: contact precautions maintained     Problem: Pain Acute  Goal: Optimal Pain Control and Function  Outcome: Progressing  Intervention: Develop Pain Management Plan  Recent Flowsheet Documentation  Taken 7/24/2025 0904 by Dayday Mendez RN  Pain Management Interventions: medication (see MAR)  Taken 7/24/2025 0846 by Dayday Mendez RN  Pain Management Interventions:   declines   care clustered  Intervention:  Prevent or Manage Pain  Recent Flowsheet Documentation  Taken 7/24/2025 0646 by Dayday Mendez, RN  Medication Review/Management: medications reviewed

## 2025-07-24 NOTE — PROGRESS NOTES
Orthopedic Surgery  7/24/2025  POD#: 3    S: Patient voices no complaints today. Pain managed well.    O: Blood pressure 139/66, pulse 68, temperature 97.1  F (36.2  C), temperature source Temporal, resp. rate 18, height 1.829 m (6'), weight (!) 159.2 kg (350 lb 15.6 oz), SpO2 99%.  Lab Results   Component Value Date    HGB 7.6 07/23/2025    HGB 13.4 10/26/2020     Lab Results   Component Value Date    INR 1.20 07/21/2025    INR 0.92 01/09/2017        I/O last 3 completed shifts:  In: 960 [P.O.:960]  Out: 1750 [Urine:1750]    Neurovascularly intact.  Calves are negative bilaterally, both soft and nontender.  The wound is C/D/I. PAOLA saturated.   The wound looks good with minimal erythema of the surrounding skin.    A: Mr. Greenwood is doing well status post Procedure(s):  Incision and drainage hip, combined, with revision of head and acetabular liner.    P: PAOLA dressing changed to Prevena.  Follow up in office in 14 days for dressing and suture removal.  1. Mobilize and continue physical therapy. No hip precautions.  2. Anticoagulation - discharge on ASA  3. 3. Septic R MARGO - s/p I&D 7/21 with head and liner exchange. ID following, appreciate abx recs; plan 6 weeks of abx via PICC line, waiting on ID determination of abx choice. PICC line placed, so ok to discharge after infusion eduction and abx determination this afternoon.  4. Pain management - controlled  5. Anticipate discharge to home after abx plan arranged by ID team.      Judy Barragan PA-C  [facility]  O: [phone]

## 2025-07-24 NOTE — PLAN OF CARE
"Goal Outcome Evaluation:      Plan of Care Reviewed With: patient    Overall Patient Progress: no changeOverall Patient Progress: no change         A&O. RA. CPAP at night.  Ax1 GB/walker. Voiding using urinal. Pt reports severe pain, IV and PO pain medications given. PICC intact, blood return noted. PAOLA dressing saturated. On call provider informed. No orders to change dressing. Discharge TBD.           Problem: Adult Inpatient Plan of Care  Goal: Plan of Care Review  Description: The Plan of Care Review/Shift note should be completed every shift.  The Outcome Evaluation is a brief statement about your assessment that the patient is improving, declining, or no change.  This information will be displayed automatically on your shift  note.  Outcome: Progressing  Flowsheets (Taken 7/24/2025 0245)  Plan of Care Reviewed With: patient  Overall Patient Progress: no change  Goal: Patient-Specific Goal (Individualized)  Description: You can add care plan individualizations to a care plan. Examples of Individualization might be:  \"Parent requests to be called daily at 9am for status\", \"I have a hard time hearing out of my right ear\", or \"Do not touch me to wake me up as it startles  me\".  Outcome: Progressing  Goal: Absence of Hospital-Acquired Illness or Injury  Outcome: Progressing  Intervention: Identify and Manage Fall Risk  Recent Flowsheet Documentation  Taken 7/23/2025 1949 by Millie Raymond, RN  Safety Promotion/Fall Prevention:   activity supervised   assistive device/personal items within reach   clutter free environment maintained   room door open   room near nurse's station  Goal: Optimal Comfort and Wellbeing  Outcome: Progressing  Goal: Readiness for Transition of Care  Outcome: Progressing     Problem: Infection  Goal: Absence of Infection Signs and Symptoms  Outcome: Progressing  Intervention: Prevent or Manage Infection  Recent Flowsheet Documentation  Taken 7/23/2025 1949 by Allison Cosby, " Millie RN  Isolation Precautions: contact precautions maintained     Problem: Pain Acute  Goal: Optimal Pain Control and Function  Outcome: Progressing  Intervention: Prevent or Manage Pain  Recent Flowsheet Documentation  Taken 7/23/2025 1949 by Millie Raymond, RN  Medication Review/Management: medications reviewed

## 2025-07-24 NOTE — PROGRESS NOTES
Respiratory Therapy Note    Patient was placed on CPAP for the night without issue. Skin checked with no signs of breakdown. RT to continue to monitor and assess during hospital stay.    Oxygen Therapy: auto CPAP 5-20 room air    RT Elizabeth on 7/23/2025 at 11:33 PM

## 2025-07-24 NOTE — PROGRESS NOTES
Hospitalist Medicine Progress Note   [facility]       Kimo Greenwood is a 44 year old gentleman with asthma, Patel's esophagus, GERD, hypertension, depression, mild mental retardation, chronic kidney disease, gout, morbid obesity, obstructive sleep apnea, overactive bladder, iron deficiency anemia and right total hip arthroplasty done in April 2025 who came into [facility] 7/19/2025 with fall and symptoms of infection of the right hip associated with progressively worsening edema redness and tenderness of the right hip to a point that he was unable to bear weight on it.  He was seen at [facility] urgent [facility] on 7/11/2025 and started on doxycycline and then later again put on Augmentin 7/16/2025.  I did mention CRP was 130 WBC 17.7 hemoglobin 10.2 lactic acid was normal at 0.8 on 7/21/2025 he had incision and drainage of the right hip with revision of the head and acetabular liner by Dr. Bala Randall MD aspirate from the right hip grew Staph aureus he is on ceftriaxone 2 g daily which has since been discontinued and the patient is on vancomycin since 7/21/2025.  PICC line is being placed for IV antibiotic treatment which is being planned for 6 weeks followed by 3 more months of oral antibiotic therapy as advised by ID.        Date of Admission:  7/19/2025  Assessment & Plan     Surgical site infection  S/p right total hip arthroplasty in April 2025  S/p incision and drainage of the right hip by MD Johann 7/21/2025  On treatment with ceftriaxone 2 g daily  Aspirate from the right hip is growing Staph aureus  I will check MRSA in the nose and until then continue the patient on vancomycin  Infectious diseases would like to continue 6 weeks of IV antibiotic therapy followed by 3 months of oral antibiotic therapy  CRP is decreasing  PICC line was placed 7/23/2025  Awaiting discharge 7/25/2025     #History of mild mental retardation  -Patient mentioned that he lives  independently at his own apartment with a close follow-up and visit from   -Social service following with us.  - Patient has a listed guardian     #History of bronchial asthma  -Not in exacerbation     #History of Patel's esophagus  #History of GERD  -Continued on home regimen of PPI     #History of benign essential hypertension   -Home regimen of prazosin was resumed     #History of CKD  -Appears to be at baseline and continue to monitor    Gout, hyperuricacidemia   Recently treated for gout with colchicine, steroid burst.  Should be on allopurinol so will resume.  Patient had not been taking at home     #History of depression  - On Cymbalta and Zyprexa     #History of morbidly obese  #History of LOREN    Plan:   Discharge in am 7/25/2025       Diet: Advance Diet as Tolerated: Regular Diet Adult  Diet    DVT Prophylaxis: Enoxaparin (Lovenox) SQ  Germain Catheter: Not present  Code Status: Full Code         Medically Ready for Discharge: Anticipated Tomorrow    Clinically Significant Risk Factors               # Hypoalbuminemia: Lowest albumin = 3.1 g/dL at 7/19/2025  2:31 PM, will monitor as appropriate       # Hypertension: Noted on problem list            # Morbid Obesity: Estimated body mass index is 47.6 kg/m  as calculated from the following:    Height as of this encounter: 1.829 m (6').    Weight as of this encounter: 159.2 kg (350 lb 15.6 oz).      # Asthma: noted on problem list              The patient's care was discussed with the Patient .    Michael Ventura MD  Hospitalist Service  [facility]    ______________________________________________________________________    Interval History     Symptoms   No new symptoms  Wants to go home     Review of Systems:   patient was seen walking in the hallways today.lumpectomy    Data reviewed today: I reviewed all medications, new labs and imaging results over the last 24 hours.     Physical Exam   Vital Signs: Temp: 97.1  F (36.2  C)  Temp src: Temporal BP: 139/66 Pulse: 68   Resp: 18 SpO2: 99 % O2 Device: None (Room air)    Weight: 350 lbs 15.56 oz      GENERAL: Patient is not in acute distress  HEENT: EOM+,Conjunctiva is clear   NECK:  no Jugular Venous distention  HEART: S1 S2 regular Rate and Rhythm, there is  no murmur,   LUNGS: Respirations are  not laboured, Lungs are  clear to auscultation without Crepitations or Wheezing   ABDOMEN: Soft , there is no tenderness , Bowel Sounds are  Positive   LOWER LIMBS: no Pedal Edema  Bilaterally I did not examine the right hip postoperative site  CNS:  Alert,  Oriented x 3, Moving all the Four Limbs     Data   Recent Labs   Lab 07/24/25  0504 07/23/25  1524 07/23/25  0552 07/22/25  0735 07/21/25  1206 07/21/25  0635 07/20/25  1209 07/20/25  0721 07/19/25  1434 07/19/25  1431   WBC  --   --  9.0 15.7*  --   --   --  15.1*  --  17.7*   HGB  --   --  7.6* 8.9*  8.9* 9.7*  --   --  9.7*  --  10.2*   MCV 86  --  85  85 84  84 85  --   --  84  --  85     --   --  326  --   --   --  318  --  344   INR  --   --   --   --  1.20*  --  1.09  --  1.09  --    NA  --   --   --  138  --   --   --  138  --  138   POTASSIUM  --   --   --  5.1  --   --   --  3.9  --  3.9   CHLORIDE  --   --   --  104  --   --   --  104  --  103   CO2  --   --   --  23  --   --   --  24  --  26   BUN  --   --   --  16.0  --   --   --  12.2  --  11.2   CR 1.41* 1.43*  --  1.16  --   --   --  1.06  --  1.15   ANIONGAP  --   --   --  11  --   --   --  10  --  9   FILEMON  --   --   --  8.7*  --   --   --  9.1  --  9.0   GLC  --   --   --  111*  --  72  --  119*  --  80   ALBUMIN  --   --   --   --   --   --   --   --   --  3.1*   PROTTOTAL  --   --   --   --   --   --   --   --   --  6.8   BILITOTAL  --   --   --   --   --   --   --   --   --  0.3   ALKPHOS  --   --   --   --   --   --   --   --   --  67   ALT  --   --   --   --   --   --   --   --   --  21   AST  --   --   --   --   --   --   --   --   --  15         No results  found for this or any previous visit (from the past 24 hours).

## 2025-07-24 NOTE — PHARMACY-VANCOMYCIN DOSING SERVICE
Pharmacy Vancomycin Note  Date of Service 2025  Patient's  1980   44 year old, male    Indication: Bone and Joint Infection  Day of Therapy: 4  Current vancomycin regimen:  1500 mg IV q12h  Current vancomycin monitoring method: AUC  Current vancomycin therapeutic monitoring goal: 400-600 mg*h/L    Insight[employer] Prediction of Current Vancomycin Regimen  Regimen: 1500 mg IV every 12 hours.  Start time: 08:52 on 2025  Exposure target: AUC24 (range) 400-600 mg/L.hr   AUC24,ss: 1032 mg/L.hr  Probability of AUC24 > 400: >95 %  Ctrough,ss: 35.7 mg/L  Probability of Ctrough,ss > 20: >95 %  Probability of nephrotoxicity (Lodise SANDRA ): 59 %      Current estimated CrCl = Estimated Creatinine Clearance: 105.3 mL/min (A) (based on SCr of 1.41 mg/dL (H)).    Creatinine for last 3 days  2025:  7:35 AM Creatinine 1.16 mg/dL  2025:  3:24 PM Creatinine 1.43 mg/dL  2025:  5:04 AM Creatinine 1.41 mg/dL    Recent Vancomycin Levels (past 3 days)  2025: 12:31 PM Vancomycin 29.8 ug/mL;  3:24 PM Vancomycin 26.9 ug/mL  2025:  5:04 AM Vancomycin 18.1 ug/mL    Vancomycin IV Administrations (past 72 hours)                     vancomycin (VANCOCIN) 1,500 mg in 0.9% NaCl 250 mL intermittent infusion (mg) 1,500 mg New Bag 25 0436     1,500 mg New Bag 25 1524     1,500 mg New Bag  0408     1,500 mg New Bag 25 1618                    Nephrotoxins and other renal medications (From now, onward)      Start     Dose/Rate Route Frequency Ordered Stop    25 1000  vancomycin (VANCOCIN) 1,500 mg in 0.9% NaCl 250 mL intermittent infusion         1,500 mg  over 90 Minutes Intravenous EVERY 24 HOURS 25 0922      25 1724  ketorolac (TORADOL) injection 30 mg         30 mg Intravenous EVERY 6 HOURS PRN 25 1724 25 1723               Contrast Orders - past 72 hours (72h ago, onward)      None            Interpretation of levels and current regimen:  Vancomycin level is  reflective of AUC greater than 600    Has serum creatinine changed greater than 50% in last 72 hours: 1.16 to 1.43 in 24 hours    Renal Function: Worsening    Insight[employer] Prediction of Planned New Vancomycin Regimen  Regimen: 1500 mg IV every 24 hours.  Start time: 08:52 on 07/24/2025  Exposure target: AUC24 (range) 400-600 mg/L.hr   AUC24,ss: 524 mg/L.hr  Probability of AUC24 > 400: >95 %  Ctrough,ss: 14.8 mg/L  Probability of Ctrough,ss > 20: %  Probability of nephrotoxicity (Lodise SANDRA 2009): 10 %      Plan:  Evening dose 7/23 held due to high trough. Checked again 7/24 AM  Decrease Dose to 1500 mg q 24 h  Vancomycin monitoring method: AUC  Vancomycin therapeutic monitoring goal: 400-600 mg*h/L  Pharmacy will check vancomycin levels as appropriate in 1-3 Days.  Serum creatinine levels will be ordered daily for the first week of therapy and at least twice weekly for subsequent weeks.    Silvestre Rodriguez, Pharmacy Resident

## 2025-07-24 NOTE — PROGRESS NOTES
[facility]    Infectious Disease Progress Note    Date of Service (when I saw the patient): 07/24/2025     Assessment & Plan   Kimo Greenwood is a 44 year old male who was admitted on 7/19/2025.     Impression:  43 yo male with history of a right total hip arthroplasty in April 2025.    Approx 10 days ago he fell and began developing right hip pain and swelling.    He was seen in urgent care and ER visits on multiple occasions and eventually was placed on oral antibiotics.    With continued pain and swelling presented to the emergency room 2 days ago and was admitted for possibly infected right hip wound.    S/p I and D and combined, with revision of head and acetabular liner   OR culture with staph aureus MRSA, 1 culture with staph epi but clearly MRSA the pathogen here  MRSA PCR Positive      Recommendations   Vanco level were high, creat also went up, vanco frequency reduced to 1500 mg Q 24 from Q 12    Would recommend hospital stay to follow improvement in the creat     Anticipate need for IV antibiotics for 6 weeks followed by oral antibiotics for 3 months based on culture   Add rifampin ( 3 months) before discharge, will start tomorrow.     Harley Drake MD    Interval History   Tolerating antibiotics ok   No new rashes or issues with antibiotics   Labs reviewed   No changes to past medical, social or family history   Feels ok   Vanco levels high vanco being held      Physical Exam   Temp: 97.1  F (36.2  C) Temp src: Temporal BP: 139/66 Pulse: 68   Resp: 18 SpO2: 99 % O2 Device: None (Room air)    Vitals:    07/19/25 1822   Weight: (!) 159.2 kg (350 lb 15.6 oz)     Vital Signs with Ranges  Temp:  [97  F (36.1  C)-97.1  F (36.2  C)] 97.1  F (36.2  C)  Pulse:  [58-82] 68  Resp:  [12-20] 18  BP: (121-141)/(50-66) 139/66  FiO2 (%):  [21 %] 21 %  SpO2:  [95 %-99 %] 99 %    Constitutional: Awake, alert, cooperative, no apparent distress  Lungs: Clear to auscultation bilaterally, no crackles  or wheezing  Cardiovascular: Regular rate and rhythm, normal S1 and S2, and no murmur noted  Abdomen: Normal bowel sounds, soft, non-distended, non-tender  Skin: No rashes, no cyanosis, no edema  Other:    Medications   Current Facility-Administered Medications   Medication Dose Route Frequency Provider Last Rate Last Admin    lactated ringers infusion   Intravenous Continuous Bala Randall MD   Stopped at 07/22/25 0532    [Held by provider] lactated ringers infusion   Intravenous Continuous Shahab Fang MD   Stopped at 07/19/25 1846     Current Facility-Administered Medications   Medication Dose Route Frequency Provider Last Rate Last Admin    allopurinol (ZYLOPRIM) tablet 300 mg  300 mg Oral Daily Bala Randall MD   300 mg at 07/24/25 0856    cholecalciferol (VITAMIN D3) tablet 10 mcg  10 mcg Oral Daily Bala Randall MD   10 mcg at 07/24/25 0856    DULoxetine (CYMBALTA) DR capsule 120 mg  120 mg Oral Daily Bala Randall MD   120 mg at 07/24/25 0855    enoxaparin ANTICOAGULANT (LOVENOX) injection 40 mg  40 mg Subcutaneous Q12H Bala Randall MD   40 mg at 07/24/25 0901    famotidine (PEPCID) tablet 20 mg  20 mg Oral BID Bala Randall MD   20 mg at 07/24/25 0856    Or    famotidine (PEPCID) injection 20 mg  20 mg Intravenous BID Bala Randall MD        ferrous sulfate (FEROSUL) tablet 325 mg  325 mg Oral Daily with breakfast Bala Randall MD   325 mg at 07/24/25 0856    hydrocortisone 2.5 % cream   Topical BID Bala Randall MD   Given at 07/22/25 0815    magnesium oxide (MAG-OX) half-tab 200 mg  200 mg Oral Daily Bala Randall MD   200 mg at 07/24/25 0856    mirabegron (MYRBETRIQ) 24 hr tablet 50 mg  50 mg Oral Daily Bala Randall MD   50 mg at 07/24/25 0856    OLANZapine (zyPREXA) tablet 15 mg  15 mg Oral At Bedtime Bala Randall MD   15 mg at 07/23/25 2122     oxyBUTYnin ER (DITROPAN XL) 24 hr tablet 30 mg  30 mg Oral Daily Bala Randall MD   30 mg at 07/24/25 0856    pantoprazole (PROTONIX) EC tablet 40 mg  40 mg Oral QAM AC Bala Randall MD   40 mg at 07/24/25 0856    polyethylene glycol (MIRALAX) Packet 17 g  17 g Oral Daily Bala Randall MD   17 g at 07/24/25 0856    prazosin (MINIPRESS) capsule 3 mg  3 mg Oral At Bedtime Bala Randall MD   3 mg at 07/23/25 2127    senna-docusate (SENOKOT-S/PERICOLACE) 8.6-50 MG per tablet 1 tablet  1 tablet Oral BID Bala Randall MD   1 tablet at 07/24/25 0856    sodium chloride (PF) 0.9% PF flush 3 mL  3 mL Intracatheter Q8H Bala Randall MD   3 mL at 07/24/25 0514    vancomycin (VANCOCIN) 1,500 mg in 0.9% NaCl 250 mL intermittent infusion  1,500 mg Intravenous Q24H Shahab Fang MD   1,500 mg at 07/24/25 0937       Data   All microbiology laboratory data reviewed.  Recent Labs   Lab Test 07/24/25  0504 07/23/25  0552 07/22/25  0735 07/21/25  1206 07/20/25  0721 07/19/25  1431   WBC  --  9.0 15.7*  --  15.1* 17.7*   HGB  --  7.6* 8.9*  8.9* 9.7* 9.7* 10.2*   HCT  --   --  25.7*  --  29.4* 31.0*   MCV 86 85  85 84  84 85 84 85     --  326  --  318 344     Recent Labs   Lab Test 07/24/25  0504 07/23/25  1524 07/22/25  0735   CR 1.41* 1.43* 1.16     Recent Labs   Lab Test 07/19/25  1431   SED 76*     Recent Labs   Lab Test 06/24/19  0844 06/24/19  0843   CULT No anaerobes isolated  No anaerobes isolated  No growth  No growth No anaerobes isolated  No growth       All cultures:  Recent Labs   Lab 07/22/25  1901 07/21/25  0930 07/21/25  0929 07/21/25  0854 07/19/25  1615 07/19/25  1432   CULTURE Culture negative, monitoring continues Culture in progress  1+ Staphylococcus epidermidis*  No anaerobic organisms isolated after 2 days  See corresponding culture for results 1+ Staphylococcus aureus MRSA*  No anaerobic organisms isolated after 2  days  No anaerobic organisms isolated after 2 days  No anaerobic organisms isolated after 2 days  1+ Staphylococcus aureus*  1+ Staphylococcus aureus*  See corresponding culture for results  See corresponding culture for results  See corresponding culture for results 2+ Staphylococcus aureus MRSA*  No anaerobic organisms isolated after 2 days  See corresponding culture for results No growth after 4 days No growth after 4 days      Blood culture:  Results for orders placed or performed during the hospital encounter of 07/19/25   Blood Culture Peripheral blood (BC) Hand, Left    Collection Time: 07/19/25  4:15 PM    Specimen: Hand, Left; Peripheral blood (BC)   Result Value Ref Range    Culture No growth after 4 days    Blood Culture Peripheral blood (BC) Arm, Right    Collection Time: 07/19/25  2:32 PM    Specimen: Arm, Right; Peripheral blood (BC)   Result Value Ref Range    Culture No growth after 4 days    Results for orders placed or performed during the hospital encounter of 05/23/25   Blood Culture Peripheral blood (BC) Arm, Right    Collection Time: 05/23/25  9:18 PM    Specimen: Arm, Right; Peripheral blood (BC)   Result Value Ref Range    Culture No Growth      *Note: Due to a large number of results and/or encounters for the requested time period, some results have not been displayed. A complete set of results can be found in Results Review.      Urine culture:  No results found. However, due to the size of the patient record, not all encounters were searched. Please check Results Review for a complete set of results.

## 2025-07-25 ENCOUNTER — APPOINTMENT (OUTPATIENT)
Dept: PHYSICAL THERAPY | Facility: CLINIC | Age: 45
DRG: 464 | End: 2025-07-25
Payer: COMMERCIAL

## 2025-07-25 VITALS
TEMPERATURE: 96.9 F | HEIGHT: 72 IN | HEART RATE: 75 BPM | DIASTOLIC BLOOD PRESSURE: 54 MMHG | OXYGEN SATURATION: 96 % | RESPIRATION RATE: 18 BRPM | BODY MASS INDEX: 42.66 KG/M2 | WEIGHT: 315 LBS | SYSTOLIC BLOOD PRESSURE: 108 MMHG

## 2025-07-25 LAB
BACTERIA SPEC CULT: NORMAL
CREAT SERPL-MCNC: 1.48 MG/DL (ref 0.67–1.17)
CRP SERPL-MCNC: 34.22 MG/L
EGFRCR SERPLBLD CKD-EPI 2021: 60 ML/MIN/1.73M2

## 2025-07-25 PROCEDURE — 250N000013 HC RX MED GY IP 250 OP 250 PS 637: Performed by: ORTHOPAEDIC SURGERY

## 2025-07-25 PROCEDURE — 120N000001 HC R&B MED SURG/OB

## 2025-07-25 PROCEDURE — 94660 CPAP INITIATION&MGMT: CPT

## 2025-07-25 PROCEDURE — 999N000157 HC STATISTIC RCP TIME EA 10 MIN

## 2025-07-25 PROCEDURE — 86140 C-REACTIVE PROTEIN: CPT | Performed by: INTERNAL MEDICINE

## 2025-07-25 PROCEDURE — 250N000011 HC RX IP 250 OP 636: Performed by: ORTHOPAEDIC SURGERY

## 2025-07-25 PROCEDURE — 250N000011 HC RX IP 250 OP 636: Performed by: INTERNAL MEDICINE

## 2025-07-25 PROCEDURE — 258N000003 HC RX IP 258 OP 636: Performed by: INTERNAL MEDICINE

## 2025-07-25 PROCEDURE — 82565 ASSAY OF CREATININE: CPT | Performed by: INTERNAL MEDICINE

## 2025-07-25 PROCEDURE — 97110 THERAPEUTIC EXERCISES: CPT | Mod: GP

## 2025-07-25 PROCEDURE — 97116 GAIT TRAINING THERAPY: CPT | Mod: GP

## 2025-07-25 PROCEDURE — 99232 SBSQ HOSP IP/OBS MODERATE 35: CPT | Performed by: INTERNAL MEDICINE

## 2025-07-25 RX ADMIN — DULOXETINE 120 MG: 60 CAPSULE, DELAYED RELEASE ORAL at 08:12

## 2025-07-25 RX ADMIN — ENOXAPARIN SODIUM 40 MG: 40 INJECTION SUBCUTANEOUS at 08:12

## 2025-07-25 RX ADMIN — OLANZAPINE 15 MG: 10 TABLET, FILM COATED ORAL at 21:38

## 2025-07-25 RX ADMIN — FERROUS SULFATE TAB 325 MG (65 MG ELEMENTAL FE) 325 MG: 325 (65 FE) TAB at 08:13

## 2025-07-25 RX ADMIN — Medication 200 MG: at 08:12

## 2025-07-25 RX ADMIN — OXYCODONE HYDROCHLORIDE 10 MG: 10 TABLET ORAL at 08:48

## 2025-07-25 RX ADMIN — SENNOSIDES AND DOCUSATE SODIUM 1 TABLET: 50; 8.6 TABLET ORAL at 21:51

## 2025-07-25 RX ADMIN — KETOROLAC TROMETHAMINE 30 MG: 30 INJECTION, SOLUTION INTRAMUSCULAR at 00:27

## 2025-07-25 RX ADMIN — ALLOPURINOL 300 MG: 300 TABLET ORAL at 08:12

## 2025-07-25 RX ADMIN — HYDROXYZINE HYDROCHLORIDE 25 MG: 25 TABLET, FILM COATED ORAL at 17:10

## 2025-07-25 RX ADMIN — OXYBUTYNIN CHLORIDE 30 MG: 5 TABLET, EXTENDED RELEASE ORAL at 08:12

## 2025-07-25 RX ADMIN — PANTOPRAZOLE SODIUM 40 MG: 40 TABLET, DELAYED RELEASE ORAL at 08:13

## 2025-07-25 RX ADMIN — MIRABEGRON 50 MG: 50 TABLET, EXTENDED RELEASE ORAL at 08:12

## 2025-07-25 RX ADMIN — SENNOSIDES AND DOCUSATE SODIUM 1 TABLET: 50; 8.6 TABLET ORAL at 08:12

## 2025-07-25 RX ADMIN — KETOROLAC TROMETHAMINE 30 MG: 30 INJECTION, SOLUTION INTRAMUSCULAR at 14:03

## 2025-07-25 RX ADMIN — PRAZOSIN HYDROCHLORIDE 3 MG: 1 CAPSULE ORAL at 21:40

## 2025-07-25 RX ADMIN — VANCOMYCIN HYDROCHLORIDE 1500 MG: 10 INJECTION, POWDER, LYOPHILIZED, FOR SOLUTION INTRAVENOUS at 09:57

## 2025-07-25 RX ADMIN — POLYETHYLENE GLYCOL 3350 17 G: 17 POWDER, FOR SOLUTION ORAL at 08:12

## 2025-07-25 RX ADMIN — FAMOTIDINE 20 MG: 20 TABLET, FILM COATED ORAL at 21:51

## 2025-07-25 RX ADMIN — FAMOTIDINE 20 MG: 20 TABLET, FILM COATED ORAL at 08:12

## 2025-07-25 RX ADMIN — KETOROLAC TROMETHAMINE 30 MG: 30 INJECTION, SOLUTION INTRAMUSCULAR at 06:40

## 2025-07-25 RX ADMIN — CHOLECALCIFEROL TAB 10 MCG (400 UNIT) 10 MCG: 10 TAB at 08:12

## 2025-07-25 RX ADMIN — HYDROXYZINE HYDROCHLORIDE 25 MG: 25 TABLET, FILM COATED ORAL at 05:03

## 2025-07-25 RX ADMIN — ENOXAPARIN SODIUM 40 MG: 40 INJECTION SUBCUTANEOUS at 21:52

## 2025-07-25 ASSESSMENT — ACTIVITIES OF DAILY LIVING (ADL)
ADLS_ACUITY_SCORE: 41
ADLS_ACUITY_SCORE: 39
ADLS_ACUITY_SCORE: 42
ADLS_ACUITY_SCORE: 41
ADLS_ACUITY_SCORE: 39
ADLS_ACUITY_SCORE: 41
ADLS_ACUITY_SCORE: 39
ADLS_ACUITY_SCORE: 42
ADLS_ACUITY_SCORE: 39
ADLS_ACUITY_SCORE: 41
ADLS_ACUITY_SCORE: 41
ADLS_ACUITY_SCORE: 39
ADLS_ACUITY_SCORE: 42
ADLS_ACUITY_SCORE: 39
ADLS_ACUITY_SCORE: 41
ADLS_ACUITY_SCORE: 42
ADLS_ACUITY_SCORE: 39
ADLS_ACUITY_SCORE: 41
ADLS_ACUITY_SCORE: 41
ADLS_ACUITY_SCORE: 39

## 2025-07-25 NOTE — PLAN OF CARE
"Goal Outcome Evaluation:      Plan of Care Reviewed With: patient    Overall Patient Progress: improvingOverall Patient Progress: improving    Outcome Evaluation: VSS. On RA. A&Ox4. SBA W/GB. Tolerating regular diet well. PICC in place - good blood return and SL. CHG bath completed. On lovenox. On Vancomycin. Voiding via urinal. Prevena in place - emptied by ortho. Dressing is CDI. Contact precautions maintained. Pain managed with PRN toradol, atarax and scheduled tylenol. Plan is discharge home with home infusion  for x6 weeks IV antiobiotics once creatinine levels decrease.      Problem: Adult Inpatient Plan of Care  Goal: Plan of Care Review  Description: The Plan of Care Review/Shift note should be completed every shift.  The Outcome Evaluation is a brief statement about your assessment that the patient is improving, declining, or no change.  This information will be displayed automatically on your shift  note.  Outcome: Progressing  Flowsheets (Taken 7/25/2025 2713)  Outcome Evaluation: VSS. On RA. A&Ox4. SBA W/GB. Tolerating regular diet well. PICC in place - good blood return and SL. CHG bath completed. On lovenox. On Vancomycin. Voiding via urinal. Prevena in place - emptied by ortho. Dressing is CDI. Contact precautions maintained. Pain managed with PRN toradol, atarax and scheduled tylenol. Plan is discharge home with home infusion  for x6 weeks IV antiobiotics once creatinine levels decrease.  Plan of Care Reviewed With: patient  Overall Patient Progress: improving  Goal: Patient-Specific Goal (Individualized)  Description: You can add care plan individualizations to a care plan. Examples of Individualization might be:  \"Parent requests to be called daily at 9am for status\", \"I have a hard time hearing out of my right ear\", or \"Do not touch me to wake me up as it startles  me\".  Outcome: Progressing  Goal: Absence of Hospital-Acquired Illness or Injury  Outcome: Progressing  Intervention: Identify and " Manage Fall Risk  Recent Flowsheet Documentation  Taken 7/25/2025 0802 by Dayday Mendez RN  Safety Promotion/Fall Prevention:   activity supervised   assistive device/personal items within reach   clutter free environment maintained   mobility aid in reach   nonskid shoes/slippers when out of bed   patient and family education   room organization consistent   safety round/check completed   supervised activity   room near nurse's station  Intervention: Prevent Skin Injury  Recent Flowsheet Documentation  Taken 7/25/2025 0802 by Dayday Mendez RN  Body Position: position changed independently  Skin Protection:   adhesive use limited   incontinence pads utilized  Intervention: Prevent and Manage VTE (Venous Thromboembolism) Risk  Recent Flowsheet Documentation  Taken 7/25/2025 0802 by Dayday Mendez RN  VTE Prevention/Management: (pt ambulating in room)   SCDs off (sequential compression devices)   patient refused intervention  Intervention: Prevent Infection  Recent Flowsheet Documentation  Taken 7/25/2025 0802 by Dayday Mendez RN  Infection Prevention:   hand hygiene promoted   rest/sleep promoted   single patient room provided  Goal: Optimal Comfort and Wellbeing  Outcome: Progressing  Intervention: Monitor Pain and Promote Comfort  Recent Flowsheet Documentation  Taken 7/25/2025 0802 by Dayday Mendez RN  Pain Management Interventions:   medication (see MAR)   cold applied   care clustered  Goal: Readiness for Transition of Care  Outcome: Progressing     Problem: Infection  Goal: Absence of Infection Signs and Symptoms  Outcome: Progressing  Intervention: Prevent or Manage Infection  Recent Flowsheet Documentation  Taken 7/25/2025 0802 by Dayday Mendez RN  Isolation Precautions: contact precautions maintained     Problem: Pain Acute  Goal: Optimal Pain Control and Function  Outcome: Progressing  Intervention: Develop Pain Management Plan  Recent Flowsheet Documentation  Taken 7/25/2025 0802 by Dayday Mendez RN  Pain  Management Interventions:   medication (see MAR)   cold applied   care clustered  Intervention: Prevent or Manage Pain  Recent Flowsheet Documentation  Taken 7/25/2025 0802 by Dayday Mendez, RN  Medication Review/Management: medications reviewed

## 2025-07-25 NOTE — PLAN OF CARE
"A&Ox4.  IV Toradol x1 for pain.  Up in chair, ambulated to BR with assist 1/gait belt/walker, voiding. Tolerating regular diet. PICC for IV antibiotics. Alarms on for safety.  ID following,   plan discharge home with IV Abx.  Isolation precautions maintained.  Problem: Adult Inpatient Plan of Care  Goal: Plan of Care Review  Description: The Plan of Care Review/Shift note should be completed every shift.  The Outcome Evaluation is a brief statement about your assessment that the patient is improving, declining, or no change.  This information will be displayed automatically on your shift  note.  Outcome: Progressing  Flowsheets (Taken 7/24/2025 1956)  Plan of Care Reviewed With: patient  Goal: Patient-Specific Goal (Individualized)  Description: You can add care plan individualizations to a care plan. Examples of Individualization might be:  \"Parent requests to be called daily at 9am for status\", \"I have a hard time hearing out of my right ear\", or \"Do not touch me to wake me up as it startles  me\".  Outcome: Progressing  Goal: Absence of Hospital-Acquired Illness or Injury  Outcome: Progressing  Intervention: Identify and Manage Fall Risk  Recent Flowsheet Documentation  Taken 7/24/2025 1730 by Daphney Coburn RN  Safety Promotion/Fall Prevention:   activity supervised   clutter free environment maintained   nonskid shoes/slippers when out of bed   safety round/check completed  Intervention: Prevent Skin Injury  Recent Flowsheet Documentation  Taken 7/24/2025 1730 by Daphney Coburn RN  Body Position: position changed independently  Intervention: Prevent and Manage VTE (Venous Thromboembolism) Risk  Recent Flowsheet Documentation  Taken 7/24/2025 1730 by Daphney Coburn RN  VTE Prevention/Management: SCDs off (sequential compression devices)  Intervention: Prevent Infection  Recent Flowsheet Documentation  Taken 7/24/2025 1730 by Daphney Coburn RN  Infection Prevention:   hand hygiene promoted   " personal protective equipment utilized   single patient room provided  Goal: Optimal Comfort and Wellbeing  Outcome: Progressing  Goal: Readiness for Transition of Care  Outcome: Progressing     Problem: Infection  Goal: Absence of Infection Signs and Symptoms  Outcome: Progressing     Problem: Pain Acute  Goal: Optimal Pain Control and Function  Outcome: Progressing  Intervention: Prevent or Manage Pain  Recent Flowsheet Documentation  Taken 7/24/2025 9241 by Daphney Coburn, RN  Medication Review/Management:   medications reviewed   high-risk medications identified   Goal Outcome Evaluation:      Plan of Care Reviewed With: patient

## 2025-07-25 NOTE — PROGRESS NOTES
[facility]    Infectious Disease Progress Note    Date of Service (when I saw the patient): 07/25/2025     Assessment & Plan   Kimo Greenwood is a 44 year old male who was admitted on 7/19/2025.     Impression:  43 yo male with history of a right total hip arthroplasty in April 2025.    Approx 10 days ago he fell and began developing right hip pain and swelling.    He was seen in urgent care and ER visits on multiple occasions and eventually was placed on oral antibiotics.    With continued pain and swelling presented to the emergency room 2 days ago and was admitted for possibly infected right hip wound.    S/p I and D and combined, with revision of head and acetabular liner   OR culture with staph aureus MRSA, 1 culture with staph epi but clearly MRSA the pathogen here  MRSA PCR Positive      Recommendations   Vanco level were high, creat also went up, vanco frequency reduced to 1500 mg Q 24 from Q 12    Would recommend hospital stay to follow improvement in the creat     Anticipate need for IV antibiotics for 6 weeks followed by oral antibiotics for 3 months based on culture   Add rifampin      Recommendations   OPIV vancomycin orders in the chart, once creat improved can be d/radha   If creat continues to go up might need different antibiotics   Reduce the dose of vanco to 1250 mg Q 24   Also starting rifampin d/cing provider please give a 3 months supply at discharge   Needs follow up in ID clinic in 2 weeks     ID will review labs again this weekend and change antibiotics as/ if needed      Harley Drake MD    Interval History       Physical Exam   Temp: 97.1  F (36.2  C) Temp src: Temporal BP: 136/73 Pulse: 66   Resp: 18 SpO2: 100 % O2 Device: None (Room air) Oxygen Delivery: 2 LPM  Vitals:    07/19/25 1822   Weight: (!) 159.2 kg (350 lb 15.6 oz)     Vital Signs with Ranges  Temp:  [96.9  F (36.1  C)-97.1  F (36.2  C)] 97.1  F (36.2  C)  Pulse:  [66-87] 66  Resp:  [18] 18  BP:  (108-136)/(54-73) 136/73  SpO2:  [96 %-100 %] 100 %      Medications   Current Facility-Administered Medications   Medication Dose Route Frequency Provider Last Rate Last Admin    lactated ringers infusion   Intravenous Continuous Bala Randall MD   Stopped at 07/22/25 0532    [Held by provider] lactated ringers infusion   Intravenous Continuous Shahab Fang MD   Stopped at 07/19/25 1846     Current Facility-Administered Medications   Medication Dose Route Frequency Provider Last Rate Last Admin    allopurinol (ZYLOPRIM) tablet 300 mg  300 mg Oral Daily Bala Randall MD   300 mg at 07/25/25 0812    cholecalciferol (VITAMIN D3) tablet 10 mcg  10 mcg Oral Daily Bala Randall MD   10 mcg at 07/25/25 0812    DULoxetine (CYMBALTA) DR capsule 120 mg  120 mg Oral Daily Bala Randall MD   120 mg at 07/25/25 0812    enoxaparin ANTICOAGULANT (LOVENOX) injection 40 mg  40 mg Subcutaneous Q12H Bala Randall MD   40 mg at 07/25/25 0812    famotidine (PEPCID) tablet 20 mg  20 mg Oral BID Bala Randall MD   20 mg at 07/25/25 0812    Or    famotidine (PEPCID) injection 20 mg  20 mg Intravenous BID Bala Randall MD        ferrous sulfate (FEROSUL) tablet 325 mg  325 mg Oral Daily with breakfast Bala Randall MD   325 mg at 07/25/25 0813    hydrocortisone 2.5 % cream   Topical BID Bala Randall MD   Given at 07/22/25 0815    magnesium oxide (MAG-OX) half-tab 200 mg  200 mg Oral Daily Bala Randall MD   200 mg at 07/25/25 0812    mirabegron (MYRBETRIQ) 24 hr tablet 50 mg  50 mg Oral Daily Bala Randall MD   50 mg at 07/25/25 0812    OLANZapine (zyPREXA) tablet 15 mg  15 mg Oral At Bedtime Bala Randall MD   15 mg at 07/24/25 2118    oxyBUTYnin ER (DITROPAN XL) 24 hr tablet 30 mg  30 mg Oral Daily Bala Randall MD   30 mg at 07/25/25 0812    pantoprazole (PROTONIX) EC tablet  40 mg  40 mg Oral QAM AC Bala Randall MD   40 mg at 07/25/25 0813    polyethylene glycol (MIRALAX) Packet 17 g  17 g Oral Daily Bala Randall MD   17 g at 07/25/25 0812    prazosin (MINIPRESS) capsule 3 mg  3 mg Oral At Bedtime Bala Randall MD   3 mg at 07/24/25 2118    senna-docusate (SENOKOT-S/PERICOLACE) 8.6-50 MG per tablet 1 tablet  1 tablet Oral BID Bala Randall MD   1 tablet at 07/25/25 0812    sodium chloride (PF) 0.9% PF flush 3 mL  3 mL Intracatheter Q8H Bala Randall MD   3 mL at 07/25/25 0641    vancomycin (VANCOCIN) 1,500 mg in 0.9% NaCl 250 mL intermittent infusion  1,500 mg Intravenous Q24H Shahab Fang MD   1,500 mg at 07/25/25 0957       Data   All microbiology laboratory data reviewed.  Recent Labs   Lab Test 07/24/25  0504 07/23/25  0552 07/22/25  0735 07/21/25  1206 07/20/25  0721 07/19/25  1431   WBC  --  9.0 15.7*  --  15.1* 17.7*   HGB  --  7.6* 8.9*  8.9* 9.7* 9.7* 10.2*   HCT  --   --  25.7*  --  29.4* 31.0*   MCV 86 85  85 84  84 85 84 85     --  326  --  318 344     Recent Labs   Lab Test 07/25/25  0640 07/24/25  0504 07/23/25  1524   CR 1.48* 1.41* 1.43*     Recent Labs   Lab Test 07/19/25  1431   SED 76*     Recent Labs   Lab Test 06/24/19  0844 06/24/19  0843   CULT No anaerobes isolated  No anaerobes isolated  No growth  No growth No anaerobes isolated  No growth       All cultures:  Recent Labs   Lab 07/22/25  1901 07/21/25  0930 07/21/25  0929 07/21/25  0854 07/19/25  1615 07/19/25  1432   CULTURE Culture negative, monitoring continues Culture in progress  1+ Staphylococcus epidermidis*  No anaerobic organisms isolated after 3 days  See corresponding culture for results No anaerobic organisms isolated after 3 days  No anaerobic organisms isolated after 3 days  No anaerobic organisms isolated after 3 days  1+ Staphylococcus aureus MRSA*  1+ Staphylococcus aureus*  1+ Staphylococcus aureus*   See corresponding culture for results  See corresponding culture for results  See corresponding culture for results No anaerobic organisms isolated after 3 days  2+ Staphylococcus aureus MRSA*  See corresponding culture for results No Growth No Growth      Blood culture:  Results for orders placed or performed during the hospital encounter of 07/19/25   Blood Culture Peripheral blood (BC) Hand, Left    Collection Time: 07/19/25  4:15 PM    Specimen: Hand, Left; Peripheral blood (BC)   Result Value Ref Range    Culture No Growth    Blood Culture Peripheral blood (BC) Arm, Right    Collection Time: 07/19/25  2:32 PM    Specimen: Arm, Right; Peripheral blood (BC)   Result Value Ref Range    Culture No Growth    Results for orders placed or performed during the hospital encounter of 05/23/25   Blood Culture Peripheral blood (BC) Arm, Right    Collection Time: 05/23/25  9:18 PM    Specimen: Arm, Right; Peripheral blood (BC)   Result Value Ref Range    Culture No Growth      *Note: Due to a large number of results and/or encounters for the requested time period, some results have not been displayed. A complete set of results can be found in Results Review.      Urine culture:  No results found. However, due to the size of the patient record, not all encounters were searched. Please check Results Review for a complete set of results.

## 2025-07-25 NOTE — PROGRESS NOTES
Orthopedic Surgery  7/25/2025    S: Patient voices no complaints today. The provena canister was full.    O: Blood pressure 136/73, pulse 66, temperature 97.1  F (36.2  C), temperature source Temporal, resp. rate 18, height 1.829 m (6'), weight (!) 159.2 kg (350 lb 15.6 oz), SpO2 100%.  Lab Results   Component Value Date    HGB 7.6 07/23/2025    HGB 13.4 10/26/2020     Lab Results   Component Value Date    INR 1.20 07/21/2025    INR 0.92 01/09/2017        Neurovascularly intact.  Calves are negative bilaterally, both soft and nontender.  The wound is dressed with Provena dressing, the dressing is intact with good suction.  The wound looks good with minimal erythema of the surrounding skin.    A: Mr. Greenwood is doing well status post Procedure(s):  Incision and drainage hip, combined, with revision of head and acetabular liner.    P: Continue physical therapy.   Antibiotics, cultures positive for MRSA, picc line in place, vanco with rifampin,   Renal function: Creatinine still rising, adjusting vanco accordingly, will monitor  Anticoagulation with lovenox, home on ASA  Pain management, doing well  Discharge planning, home with home infusion when medically stable    Bala Randall MD  [phone]

## 2025-07-25 NOTE — CONSULTS
"NUTRITION BRIEF NOTE      REASON FOR NUTRITION BRIEF NOTE:  Chart check at length of stay (LOS) per policy.    CHART CHECK:  - Per review of flowsheets, patient is consuming % of meals and ordering meals consistently per meal system review.    - There is documentation of trace to moderate, LE edema.  Is above previous trending outlined below:  Wt Readings from Last 10 Encounters:   07/19/25 (!) 159.2 kg (350 lb 15.6 oz)   07/16/25 (!) 150.1 kg (331 lb)   07/11/25 (!) 150.1 kg (331 lb)   06/30/25 (!) 150.3 kg (331 lb 6.4 oz)   06/15/25 (!) 155 kg (341 lb 11.4 oz)   06/13/25 (!) 156 kg (344 lb)   06/09/25 (!) 156 kg (344 lb)   06/05/25 (!) 158.8 kg (350 lb)   05/28/25 (!) 154.7 kg (341 lb)   05/27/25 (!) 154.7 kg (341 lb)     - No documentation of pressure injury.    FOLLOW UP:  Will re-check chart in 7-10 days per policy, unless formally consulted in the interim.  Please formally consult if needs arise.      Dorina Cui RDN, , LD  Clinical Dietitian  [facility] Message Group: \"Dietitian [[place]]\"  Office: [phone]    "

## 2025-07-25 NOTE — PROGRESS NOTES
Hospitalist Medicine Progress Note   [facility]       Kimo Greenwood is a 44 year old gentleman with asthma, Patel's esophagus, GERD, hypertension, depression, mild mental retardation, chronic kidney disease, gout, morbid obesity, obstructive sleep apnea, overactive bladder, iron deficiency anemia and right total hip arthroplasty done in April 2025 who came into [facility] 7/19/2025 with fall and symptoms of infection of the right hip associated with progressively worsening edema redness and tenderness of the right hip to a point that he was unable to bear weight on it.  He was seen at [facility] urgent care on 7/11/2025 and started on doxycycline and then later again put on Augmentin 7/16/2025.  I did mention CRP was 130 WBC 17.7 hemoglobin 10.2 lactic acid was normal at 0.8 on 7/21/2025 he had incision and drainage of the right hip with revision of the head and acetabular liner by Dr. Bala Randall MD aspirate from the right hip grew Staph aureus he is on ceftriaxone 2 g daily which has since been discontinued and the patient is on vancomycin since 7/21/2025.  PICC line is being placed for IV antibiotic treatment which is being planned for 6 weeks followed by 3 more months of oral antibiotic therapy as advised by ID.        Date of Admission:  7/19/2025  Assessment & Plan     Surgical site infection  S/p right total hip arthroplasty in April 2025  S/p incision and drainage of the right hip by MD Johann 7/21/2025  On treatment with ceftriaxone 2 g daily  Aspirate from the right hip is growing Staph aureus  I will check MRSA in the nose and until then continue the patient on vancomycin  Infectious diseases would like to continue 6 weeks of IV antibiotic therapy followed by 3 months of oral antibiotic therapy  CRP is decreasing  PICC line was placed 7/23/2025  Awaiting discharge 7/25/2025     #History of mild mental retardation  -Patient mentioned that he lives  independently at his own apartment with a close follow-up and visit from   -Social service following with us.  - Patient has a listed guardian     #History of bronchial asthma  -Not in exacerbation     #History of Patel's esophagus  #History of GERD  -Continued on home regimen of PPI     #History of benign essential hypertension   -Home regimen of prazosin was resumed     #History of CKD  -Appears to be at baseline and continue to monitor    Gout, hyperuricacidemia   Recently treated for gout with colchicine, steroid burst.  Should be on allopurinol so will resume.  Patient had not been taking at home     #History of depression  - On Cymbalta and Zyprexa     #History of morbidly obese  #History of LOREN    Plan:   Discharge in am 7/26/2025 if creatinine is improved   BMP in am       Diet: Advance Diet as Tolerated: Regular Diet Adult  Diet    DVT Prophylaxis: Enoxaparin (Lovenox) SQ  Germain Catheter: Not present  Code Status: Full Code         Medically Ready for Discharge: Anticipated Tomorrow    Clinically Significant Risk Factors               # Hypoalbuminemia: Lowest albumin = 3.1 g/dL at 7/19/2025  2:31 PM, will monitor as appropriate       # Hypertension: Noted on problem list            # Morbid Obesity: Estimated body mass index is 47.6 kg/m  as calculated from the following:    Height as of this encounter: 1.829 m (6').    Weight as of this encounter: 159.2 kg (350 lb 15.6 oz).      # Asthma: noted on problem list              The patient's care was discussed with the Patient .    Michael Ventura MD  Hospitalist Service  [facility]    ______________________________________________________________________    Interval History     Symptoms   No new symptoms    Review of Systems:   As above     Data reviewed today: I reviewed all medications, new labs and imaging results over the last 24 hours.     Physical Exam   Vital Signs: Temp: 97.1  F (36.2  C) Temp src: Temporal BP: 136/73  Pulse: 66   Resp: 18 SpO2: 100 % O2 Device: None (Room air)    Weight: 350 lbs 15.56 oz      GENERAL: Patient is not in acute distress  HEENT: EOM+,Conjunctiva is clear   NECK:  no Jugular Venous distention  HEART: S1 S2 regular Rate and Rhythm, there is  no murmur,   LUNGS: Respirations are  not laboured, Lungs are  clear to auscultation without Crepitations or Wheezing   ABDOMEN: Soft , there is no tenderness , Bowel Sounds are  Positive   LOWER LIMBS: no Pedal Edema  Bilaterally I did not examine the right hip postoperative site  CNS:  Alert,  Oriented x 3, Moving all the Four Limbs     Data   Recent Labs   Lab 07/25/25  0640 07/24/25  0504 07/23/25  1524 07/23/25  0552 07/22/25  0735 07/21/25  1206 07/21/25  0635 07/20/25  1209 07/20/25  0721 07/19/25  1434 07/19/25  1431   WBC  --   --   --  9.0 15.7*  --   --   --  15.1*  --  17.7*   HGB  --   --   --  7.6* 8.9*  8.9* 9.7*  --   --  9.7*  --  10.2*   MCV  --  86  --  85  85 84  84 85  --   --  84  --  85   PLT  --  319  --   --  326  --   --   --  318  --  344   INR  --   --   --   --   --  1.20*  --  1.09  --  1.09  --    NA  --   --   --   --  138  --   --   --  138  --  138   POTASSIUM  --   --   --   --  5.1  --   --   --  3.9  --  3.9   CHLORIDE  --   --   --   --  104  --   --   --  104  --  103   CO2  --   --   --   --  23  --   --   --  24  --  26   BUN  --   --   --   --  16.0  --   --   --  12.2  --  11.2   CR 1.48* 1.41* 1.43*  --  1.16  --   --   --  1.06  --  1.15   ANIONGAP  --   --   --   --  11  --   --   --  10  --  9   FILEMON  --   --   --   --  8.7*  --   --   --  9.1  --  9.0   GLC  --   --   --   --  111*  --  72  --  119*  --  80   ALBUMIN  --   --   --   --   --   --   --   --   --   --  3.1*   PROTTOTAL  --   --   --   --   --   --   --   --   --   --  6.8   BILITOTAL  --   --   --   --   --   --   --   --   --   --  0.3   ALKPHOS  --   --   --   --   --   --   --   --   --   --  67   ALT  --   --   --   --   --   --   --   --   --   --   21   AST  --   --   --   --   --   --   --   --   --   --  15         No results found for this or any previous visit (from the past 24 hours).

## 2025-07-25 NOTE — PLAN OF CARE
"Goal Outcome Evaluation:      Plan of Care Reviewed With: patient    Overall Patient Progress: improvingOverall Patient Progress: improving    Outcome Evaluation: A/Ox4, on RA, assist of 1 GB/W, uses urinal at night, cpap at night, prevena drain to right hip, sanguinous drainage, PICC RUE good blood return and SL, pain controlled with prn toradol,atarax and tylenol, contact precautions maintained for MRSA, IV vancomycin, dressing CDI, plan is discharge back to home with home IV abx infusions x6wks.          Problem: Adult Inpatient Plan of Care  Goal: Plan of Care Review  Description: The Plan of Care Review/Shift note should be completed every shift.  The Outcome Evaluation is a brief statement about your assessment that the patient is improving, declining, or no change.  This information will be displayed automatically on your shift  note.  Outcome: Progressing  Flowsheets (Taken 7/25/2025 6933)  Outcome Evaluation: A/Ox4, on RA, assist of 1 GB/W, uses urinal at night, cpap at night, prevena drain to right hip, sanguinous drainage, PICC RUE good blood return and SL, pain controlled with prn toradol,atarax and tylenol, contact precautions maintained for MRSA, IV vancomycin, dressing CDI, plan is discharge back to home with home IV abx infusions x6wks.  Plan of Care Reviewed With: patient  Overall Patient Progress: improving  Goal: Patient-Specific Goal (Individualized)  Description: You can add care plan individualizations to a care plan. Examples of Individualization might be:  \"Parent requests to be called daily at 9am for status\", \"I have a hard time hearing out of my right ear\", or \"Do not touch me to wake me up as it startles  me\".  Outcome: Progressing  Goal: Absence of Hospital-Acquired Illness or Injury  Outcome: Progressing  Intervention: Identify and Manage Fall Risk  Recent Flowsheet Documentation  Taken 7/25/2025 0030 by Anna Wu RN  Safety Promotion/Fall Prevention:   assistive device/personal " items within reach   activity supervised   safety round/check completed  Intervention: Prevent Skin Injury  Recent Flowsheet Documentation  Taken 7/25/2025 0030 by Anna Wu RN  Body Position:   left   side-lying   position changed independently  Skin Protection: incontinence pads utilized  Taken 7/25/2025 0027 by Anna Wu RN  Body Position:   left   side-lying  Intervention: Prevent and Manage VTE (Venous Thromboembolism) Risk  Recent Flowsheet Documentation  Taken 7/25/2025 0030 by Anna Wu RN  VTE Prevention/Management: SCDs on (sequential compression devices)  Intervention: Prevent Infection  Recent Flowsheet Documentation  Taken 7/25/2025 0030 by Anna Wu RN  Infection Prevention:   single patient room provided   rest/sleep promoted   hand hygiene promoted  Goal: Optimal Comfort and Wellbeing  Outcome: Progressing  Intervention: Monitor Pain and Promote Comfort  Recent Flowsheet Documentation  Taken 7/25/2025 0027 by Anna Wu RN  Pain Management Interventions: medication (see MAR)  Goal: Readiness for Transition of Care  Outcome: Progressing     Problem: Infection  Goal: Absence of Infection Signs and Symptoms  Outcome: Progressing  Intervention: Prevent or Manage Infection  Recent Flowsheet Documentation  Taken 7/25/2025 0030 by Anna Wu RN  Infection Management: aseptic technique maintained  Isolation Precautions: contact precautions maintained     Problem: Pain Acute  Goal: Optimal Pain Control and Function  Outcome: Progressing  Intervention: Develop Pain Management Plan  Recent Flowsheet Documentation  Taken 7/25/2025 0027 by Anna Wu RN  Pain Management Interventions: medication (see MAR)  Intervention: Prevent or Manage Pain  Recent Flowsheet Documentation  Taken 7/25/2025 0030 by Anna Wu RN  Sensory Stimulation Regulation:   care clustered   quiet environment promoted  Sleep/Rest Enhancement:   awakenings minimized   room darkened   regular  sleep/rest pattern promoted  Medication Review/Management: medications reviewed

## 2025-07-25 NOTE — PROGRESS NOTES
Care Management Follow Up    Length of Stay (days): 6    Expected Discharge Date: 07/25/2025     Concerns to be Addressed: all concerns addressed in this encounter     Patient plan of care discussed at interdisciplinary rounds: Yes    Anticipated Discharge Disposition:  home, home infusion      Referrals Placed by CM/SW:    Private pay costs discussed: Not applicable    Discussed  Partnership in Safe Discharge Planning  document with patient/family: No     Handoff Completed: No, handoff not indicated or clinically appropriate    Additional Information:  Care management following for discharge planning. Plan at this time is for patient to discharge home with IV antibiotics through [facility]. Patients mother stated that between herself and other family members they plan to provide assist with the iv antibiotics. Patient is currently on vanco every 24 hours. [facility] is looking for home care for patient.   Patient not medically ready for discharge. Per ID note patient to stay until creatinine improves.    Next Steps: monitor for medical readiness, coordinate with home infusion    Carol Bradford RN  Care Coordinator  [facility]

## 2025-07-26 LAB
ANION GAP SERPL CALCULATED.3IONS-SCNC: 11 MMOL/L (ref 7–15)
BACTERIA TISS BX CULT: ABNORMAL
BUN SERPL-MCNC: 15 MG/DL (ref 6–20)
CALCIUM SERPL-MCNC: 9.1 MG/DL (ref 8.8–10.4)
CHLORIDE SERPL-SCNC: 106 MMOL/L (ref 98–107)
CREAT SERPL-MCNC: 1.5 MG/DL (ref 0.67–1.17)
CRP SERPL-MCNC: 28.7 MG/L
EGFRCR SERPLBLD CKD-EPI 2021: 59 ML/MIN/1.73M2
GLUCOSE SERPL-MCNC: 80 MG/DL (ref 70–99)
HCO3 SERPL-SCNC: 25 MMOL/L (ref 22–29)
POTASSIUM SERPL-SCNC: 4.2 MMOL/L (ref 3.4–5.3)
SODIUM SERPL-SCNC: 142 MMOL/L (ref 135–145)
VANCOMYCIN SERPL-MCNC: 13.4 UG/ML (ref ?–25)

## 2025-07-26 PROCEDURE — 258N000003 HC RX IP 258 OP 636: Performed by: INTERNAL MEDICINE

## 2025-07-26 PROCEDURE — 250N000013 HC RX MED GY IP 250 OP 250 PS 637: Performed by: INTERNAL MEDICINE

## 2025-07-26 PROCEDURE — 99232 SBSQ HOSP IP/OBS MODERATE 35: CPT | Performed by: INTERNAL MEDICINE

## 2025-07-26 PROCEDURE — 250N000011 HC RX IP 250 OP 636: Performed by: INTERNAL MEDICINE

## 2025-07-26 PROCEDURE — 80202 ASSAY OF VANCOMYCIN: CPT | Performed by: INTERNAL MEDICINE

## 2025-07-26 PROCEDURE — 250N000013 HC RX MED GY IP 250 OP 250 PS 637: Performed by: ORTHOPAEDIC SURGERY

## 2025-07-26 PROCEDURE — 250N000011 HC RX IP 250 OP 636: Performed by: ORTHOPAEDIC SURGERY

## 2025-07-26 PROCEDURE — 86140 C-REACTIVE PROTEIN: CPT | Performed by: INTERNAL MEDICINE

## 2025-07-26 PROCEDURE — 82435 ASSAY OF BLOOD CHLORIDE: CPT | Performed by: INTERNAL MEDICINE

## 2025-07-26 PROCEDURE — 120N000001 HC R&B MED SURG/OB

## 2025-07-26 RX ORDER — RIFAMPIN 300 MG/1
300 CAPSULE ORAL EVERY 12 HOURS SCHEDULED
Status: DISCONTINUED | OUTPATIENT
Start: 2025-07-26 | End: 2025-07-28 | Stop reason: HOSPADM

## 2025-07-26 RX ORDER — SODIUM CHLORIDE, SODIUM LACTATE, POTASSIUM CHLORIDE, CALCIUM CHLORIDE 600; 310; 30; 20 MG/100ML; MG/100ML; MG/100ML; MG/100ML
INJECTION, SOLUTION INTRAVENOUS CONTINUOUS
Status: DISCONTINUED | OUTPATIENT
Start: 2025-07-26 | End: 2025-07-28 | Stop reason: HOSPADM

## 2025-07-26 RX ADMIN — OXYBUTYNIN CHLORIDE 30 MG: 5 TABLET, EXTENDED RELEASE ORAL at 08:29

## 2025-07-26 RX ADMIN — OLANZAPINE 15 MG: 10 TABLET, FILM COATED ORAL at 22:09

## 2025-07-26 RX ADMIN — OXYCODONE HYDROCHLORIDE 10 MG: 10 TABLET ORAL at 20:47

## 2025-07-26 RX ADMIN — MIRABEGRON 50 MG: 50 TABLET, EXTENDED RELEASE ORAL at 08:29

## 2025-07-26 RX ADMIN — FERROUS SULFATE TAB 325 MG (65 MG ELEMENTAL FE) 325 MG: 325 (65 FE) TAB at 08:29

## 2025-07-26 RX ADMIN — ENOXAPARIN SODIUM 40 MG: 40 INJECTION SUBCUTANEOUS at 08:29

## 2025-07-26 RX ADMIN — ALLOPURINOL 300 MG: 300 TABLET ORAL at 08:29

## 2025-07-26 RX ADMIN — PANTOPRAZOLE SODIUM 40 MG: 40 TABLET, DELAYED RELEASE ORAL at 08:29

## 2025-07-26 RX ADMIN — ENOXAPARIN SODIUM 40 MG: 40 INJECTION SUBCUTANEOUS at 20:47

## 2025-07-26 RX ADMIN — DULOXETINE 120 MG: 60 CAPSULE, DELAYED RELEASE ORAL at 08:29

## 2025-07-26 RX ADMIN — POLYETHYLENE GLYCOL 3350 17 G: 17 POWDER, FOR SOLUTION ORAL at 08:29

## 2025-07-26 RX ADMIN — SODIUM CHLORIDE, SODIUM LACTATE, POTASSIUM CHLORIDE, AND CALCIUM CHLORIDE: .6; .31; .03; .02 INJECTION, SOLUTION INTRAVENOUS at 18:50

## 2025-07-26 RX ADMIN — SENNOSIDES AND DOCUSATE SODIUM 1 TABLET: 50; 8.6 TABLET ORAL at 20:48

## 2025-07-26 RX ADMIN — Medication 200 MG: at 08:29

## 2025-07-26 RX ADMIN — FAMOTIDINE 20 MG: 20 TABLET, FILM COATED ORAL at 08:29

## 2025-07-26 RX ADMIN — PRAZOSIN HYDROCHLORIDE 3 MG: 1 CAPSULE ORAL at 22:12

## 2025-07-26 RX ADMIN — FAMOTIDINE 20 MG: 20 TABLET, FILM COATED ORAL at 20:48

## 2025-07-26 RX ADMIN — OXYCODONE HYDROCHLORIDE 10 MG: 10 TABLET ORAL at 09:29

## 2025-07-26 RX ADMIN — CHOLECALCIFEROL TAB 10 MCG (400 UNIT) 10 MCG: 10 TAB at 08:29

## 2025-07-26 RX ADMIN — VANCOMYCIN HYDROCHLORIDE 1250 MG: 5 INJECTION, POWDER, LYOPHILIZED, FOR SOLUTION INTRAVENOUS at 09:29

## 2025-07-26 RX ADMIN — RIFAMPIN 300 MG: 300 CAPSULE ORAL at 18:50

## 2025-07-26 RX ADMIN — SENNOSIDES AND DOCUSATE SODIUM 1 TABLET: 50; 8.6 TABLET ORAL at 08:29

## 2025-07-26 ASSESSMENT — ACTIVITIES OF DAILY LIVING (ADL)
ADLS_ACUITY_SCORE: 42

## 2025-07-26 NOTE — PROGRESS NOTES
[facility]    Infectious Disease Progress Note    Date of Service (when I saw the patient): 07/26/2025     Assessment & Plan   Kimo Greenwood is a 44 year old male who was admitted on 7/19/2025.     Impression:  45 yo male with history of a right total hip arthroplasty in April 2025.    Approx 10 days ago he fell and began developing right hip pain and swelling.    He was seen in urgent care and ER visits on multiple occasions and eventually was placed on oral antibiotics.    With continued pain and swelling presented to the emergency room 2 days ago and was admitted for possibly infected right hip wound.    S/p I and D and combined, with revision of head and acetabular liner   OR culture with staph aureus MRSA, 1 culture with staph epi but clearly MRSA the pathogen here  MRSA PCR Positive      Recommendations   Vanco level high, creat also went up, vanco frequency reduced to 1500 mg Q 24 from Q 12   Follow-up creatinine still at 1.5    Anticipate need for IV antibiotics for 6 weeks followed by oral antibiotics for 3 months based on culture   Add rifampin at some point      Recommendations   Creatinine still elevated, if comes down tomorrow home on the same Vanco dose with close monitoring, if it is either rising or the same level probably switch to daptomycin is a long-term antibiotic    Eventual rifampin addition    Didier Andrade MD    Interval History       Physical Exam   Temp: 97.2  F (36.2  C) Temp src: Temporal BP: (!) 153/83 Pulse: 82   Resp: 16 SpO2: 96 % O2 Device: None (Room air)    Vitals:    07/19/25 1822   Weight: (!) 159.2 kg (350 lb 15.6 oz)     Vital Signs with Ranges  Temp:  [96.8  F (36  C)-97.6  F (36.4  C)] 97.2  F (36.2  C)  Pulse:  [71-82] 82  Resp:  [16-18] 16  BP: (126-153)/(46-83) 153/83  FiO2 (%):  [21 %] 21 %  SpO2:  [94 %-99 %] 96 %      Medications   Current Facility-Administered Medications   Medication Dose Route Frequency Provider Last Rate Last Admin     lactated ringers infusion   Intravenous Continuous Bala Randall MD   Stopped at 07/22/25 0532    [Held by provider] lactated ringers infusion   Intravenous Continuous Shahab Fang MD   Stopped at 07/19/25 0526     Current Facility-Administered Medications   Medication Dose Route Frequency Provider Last Rate Last Admin    allopurinol (ZYLOPRIM) tablet 300 mg  300 mg Oral Daily Bala Randall MD   300 mg at 07/26/25 0829    cholecalciferol (VITAMIN D3) tablet 10 mcg  10 mcg Oral Daily Bala Randall MD   10 mcg at 07/26/25 0829    DULoxetine (CYMBALTA) DR capsule 120 mg  120 mg Oral Daily Bala Randall MD   120 mg at 07/26/25 0829    enoxaparin ANTICOAGULANT (LOVENOX) injection 40 mg  40 mg Subcutaneous Q12H Bala Randall MD   40 mg at 07/26/25 0829    famotidine (PEPCID) tablet 20 mg  20 mg Oral BID Bala Randall MD   20 mg at 07/26/25 0829    Or    famotidine (PEPCID) injection 20 mg  20 mg Intravenous BID Bala Randall MD        ferrous sulfate (FEROSUL) tablet 325 mg  325 mg Oral Daily with breakfast Bala Randall MD   325 mg at 07/26/25 0829    hydrocortisone 2.5 % cream   Topical BID Bala Randall MD   Given at 07/22/25 0815    magnesium oxide (MAG-OX) half-tab 200 mg  200 mg Oral Daily Bala Randall MD   200 mg at 07/26/25 0829    mirabegron (MYRBETRIQ) 24 hr tablet 50 mg  50 mg Oral Daily Bala Randall MD   50 mg at 07/26/25 0829    OLANZapine (zyPREXA) tablet 15 mg  15 mg Oral At Bedtime Bala Randall MD   15 mg at 07/25/25 2138    oxyBUTYnin ER (DITROPAN XL) 24 hr tablet 30 mg  30 mg Oral Daily Bala Randall MD   30 mg at 07/26/25 0829    pantoprazole (PROTONIX) EC tablet 40 mg  40 mg Oral QAM AC Bala Randall MD   40 mg at 07/26/25 0829    polyethylene glycol (MIRALAX) Packet 17 g  17 g Oral Daily Bala Randall MD   17 g at  07/26/25 0829    prazosin (MINIPRESS) capsule 3 mg  3 mg Oral At Bedtime Bala Randall MD   3 mg at 07/25/25 2140    senna-docusate (SENOKOT-S/PERICOLACE) 8.6-50 MG per tablet 1 tablet  1 tablet Oral BID Bala Randall MD   1 tablet at 07/26/25 0829    sodium chloride (PF) 0.9% PF flush 3 mL  3 mL Intracatheter Q8H aBla Randall MD   3 mL at 07/26/25 0547    vancomycin (VANCOCIN) 1,250 mg in 0.9% NaCl 250 mL intermittent infusion  1,250 mg Intravenous Q24H Harley Drake MD   1,250 mg at 07/26/25 0929       Data   All microbiology laboratory data reviewed.  Recent Labs   Lab Test 07/24/25  0504 07/23/25  0552 07/22/25  0735 07/21/25  1206 07/20/25  0721 07/19/25  1431   WBC  --  9.0 15.7*  --  15.1* 17.7*   HGB  --  7.6* 8.9*  8.9* 9.7* 9.7* 10.2*   HCT  --   --  25.7*  --  29.4* 31.0*   MCV 86 85  85 84  84 85 84 85     --  326  --  318 344     Recent Labs   Lab Test 07/26/25  0547 07/25/25  0640 07/24/25  0504   CR 1.50* 1.48* 1.41*     Recent Labs   Lab Test 07/19/25  1431   SED 76*     Recent Labs   Lab Test 06/24/19  0844 06/24/19  0843   CULT No anaerobes isolated  No anaerobes isolated  No growth  No growth No anaerobes isolated  No growth       All cultures:  Recent Labs   Lab 07/22/25  1901 07/21/25  0930 07/21/25  0929 07/21/25  0854 07/19/25  1615 07/19/25  1432   CULTURE Staphylococcus aureus MRSA not isolated upon subculture 1+ Staphylococcus epidermidis*  No anaerobic organisms isolated after 4 days  See corresponding culture for results No anaerobic organisms isolated after 5 days  No anaerobic organisms isolated after 4 days  No anaerobic organisms isolated after 4 days  1+ Staphylococcus aureus MRSA*  1+ Staphylococcus aureus*  1+ Staphylococcus aureus*  See corresponding culture for results  See corresponding culture for results  See corresponding culture for results No anaerobic organisms isolated after 5 days  2+ Staphylococcus aureus  MRSA*  See corresponding culture for results No Growth No Growth      Blood culture:  Results for orders placed or performed during the hospital encounter of 07/19/25   Blood Culture Peripheral blood (BC) Hand, Left    Collection Time: 07/19/25  4:15 PM    Specimen: Hand, Left; Peripheral blood (BC)   Result Value Ref Range    Culture No Growth    Blood Culture Peripheral blood (BC) Arm, Right    Collection Time: 07/19/25  2:32 PM    Specimen: Arm, Right; Peripheral blood (BC)   Result Value Ref Range    Culture No Growth    Results for orders placed or performed during the hospital encounter of 05/23/25   Blood Culture Peripheral blood (BC) Arm, Right    Collection Time: 05/23/25  9:18 PM    Specimen: Arm, Right; Peripheral blood (BC)   Result Value Ref Range    Culture No Growth      *Note: Due to a large number of results and/or encounters for the requested time period, some results have not been displayed. A complete set of results can be found in Results Review.      Urine culture:  No results found. However, due to the size of the patient record, not all encounters were searched. Please check Results Review for a complete set of results.

## 2025-07-26 NOTE — PLAN OF CARE
"Goal Outcome Evaluation:      Plan of Care Reviewed With: patient    Overall Patient Progress: improvingOverall Patient Progress: improving    Outcome Evaluation: VSS on RA. AOx4. Standby assist with GB/W. PICC audit complete.      Problem: Adult Inpatient Plan of Care  Goal: Plan of Care Review  Description: The Plan of Care Review/Shift note should be completed every shift.  The Outcome Evaluation is a brief statement about your assessment that the patient is improving, declining, or no change.  This information will be displayed automatically on your shift  note.  Outcome: Progressing  Flowsheets (Taken 7/26/2025 0313)  Outcome Evaluation: VSS on RA. AOx4. Standby assist with GB/W. PICC audit complete.  Plan of Care Reviewed With: patient  Overall Patient Progress: improving  Goal: Patient-Specific Goal (Individualized)  Description: You can add care plan individualizations to a care plan. Examples of Individualization might be:  \"Parent requests to be called daily at 9am for status\", \"I have a hard time hearing out of my right ear\", or \"Do not touch me to wake me up as it startles  me\".  Outcome: Progressing  Goal: Absence of Hospital-Acquired Illness or Injury  Outcome: Progressing  Intervention: Identify and Manage Fall Risk  Recent Flowsheet Documentation  Taken 7/25/2025 2326 by Wilfredo Cruz RN  Safety Promotion/Fall Prevention: activity supervised  Intervention: Prevent Skin Injury  Recent Flowsheet Documentation  Taken 7/25/2025 2326 by Wilfredo Cruz RN  Body Position: position changed independently  Intervention: Prevent and Manage VTE (Venous Thromboembolism) Risk  Recent Flowsheet Documentation  Taken 7/25/2025 2326 by Wilfredo Crzu RN  VTE Prevention/Management: (pt ambulating in room, education provided)   SCDs off (sequential compression devices)   patient refused intervention  Intervention: Prevent Infection  Recent Flowsheet Documentation  Taken 7/25/2025 2326 by Wilfredo Cruz" RN  Infection Prevention:   hand hygiene promoted   rest/sleep promoted   single patient room provided  Goal: Optimal Comfort and Wellbeing  Outcome: Progressing  Intervention: Monitor Pain and Promote Comfort  Recent Flowsheet Documentation  Taken 7/25/2025 2326 by Wilfredo Cruz, RN  Pain Management Interventions:   care clustered   emotional support   quiet environment facilitated   rest  Goal: Readiness for Transition of Care  Outcome: Progressing

## 2025-07-26 NOTE — PHARMACY-VANCOMYCIN DOSING SERVICE
Pharmacy Vancomycin Note  Date of Service 2025  Patient's  1980   44 year old, male    Indication: Bone and Joint Infection  Day of Therapy: 6  Current vancomycin regimen:  1250 mg IV q24h  Current vancomycin monitoring method: AUC  Current vancomycin therapeutic monitoring goal: 400-600 mg*h/L    InsightRX Prediction of Current Vancomycin Regimen    Regimen: 1250 mg IV every 24 hours.  Start time: 09:57 on 2025  Exposure target: AUC24 (range) 400-600 mg/L.hr   AUC24,ss: 438 mg/L.hr  Probability of AUC24 > 400: >95 %  Ctrough,ss: 11.8 mg/L  Probability of Ctrough,ss > 20: %  Probability of nephrotoxicity (Lodise SANDRA ): 7 %      Current estimated CrCl = Estimated Creatinine Clearance: 99 mL/min (A) (based on SCr of 1.5 mg/dL (H)).    Creatinine for last 3 days  2025:  3:24 PM Creatinine 1.43 mg/dL  2025:  5:04 AM Creatinine 1.41 mg/dL  2025:  6:40 AM Creatinine 1.48 mg/dL  2025:  5:47 AM Creatinine 1.50 mg/dL    Recent Vancomycin Levels (past 3 days)  2025: 12:31 PM Vancomycin 29.8 ug/mL;  3:24 PM Vancomycin 26.9 ug/mL  2025:  5:04 AM Vancomycin 18.1 ug/mL  2025:  5:47 AM Vancomycin 13.4 ug/mL    Vancomycin IV Administrations (past 72 hours)                     vancomycin (VANCOCIN) 1,500 mg in 0.9% NaCl 250 mL intermittent infusion (mg) 1,500 mg New Bag 25 0957     1,500 mg New Bag 25 0937                    Nephrotoxins and other renal medications (From now, onward)      Start     Dose/Rate Route Frequency Ordered Stop    25 1000  vancomycin (VANCOCIN) 1,250 mg in 0.9% NaCl 250 mL intermittent infusion         1,250 mg  over 90 Minutes Intravenous EVERY 24 HOURS 25 1206      25 0000  vancomycin (VANCOCIN) 1250 mg/250 mL IVPB         1,250 mg  over 90 Minutes Intravenous EVERY 24 HOURS 25 1208 25 5788               Contrast Orders - past 72 hours (72h ago, onward)      None            Interpretation of levels and  current regimen:  Vancomycin level is reflective of -600    Has serum creatinine changed greater than 50% in last 72 hours: No    Urine output:  unable to determine    Renal Function: Stable  d paste InsightRX summary here (delete text if not performing AUC-guided monitoring or planning to continue current dose).}    Plan:  Continue Current Dose  Vancomycin monitoring method: AUC  Vancomycin therapeutic monitoring goal: 400-600 mg*h/L  Pharmacy will check vancomycin levels as appropriate in 1-3 Days.  Serum creatinine levels will be ordered daily due to worsening renal function when started vanco  Iveth Carter, [facility]

## 2025-07-26 NOTE — PROGRESS NOTES
Hospitalist Medicine Progress Note   [facility]       Kimo Greenwood is a 44 year old gentleman with asthma, Patel's esophagus, GERD, hypertension, depression, mild mental retardation, chronic kidney disease, gout, morbid obesity, obstructive sleep apnea, overactive bladder, iron deficiency anemia and right total hip arthroplasty done in April 2025 who came into [facility] 7/19/2025 with fall and symptoms of infection of the right hip associated with progressively worsening edema redness and tenderness of the right hip to a point that he was unable to bear weight on it.  He was seen at [facility] urgent care on 7/11/2025 and started on doxycycline and then later again put on Augmentin 7/16/2025.  I did mention CRP was 130 WBC 17.7 hemoglobin 10.2 lactic acid was normal at 0.8 on 7/21/2025 he had incision and drainage of the right hip with revision of the head and acetabular liner by Dr. Bala Randall MD aspirate from the right hip grew Staph aureus he is on ceftriaxone 2 g daily which has since been discontinued and the patient is on vancomycin since 7/21/2025.  PICC line is placed for IV antibiotic treatment which is being planned for 6 weeks followed by 3 more months of oral antibiotic therapy as advised by ID.  Creatinine is monitored with vancomycin dosing and it is decided by ID that should the creatinine continue to be elevated by tomorrow 7/27/2025 patient will be started on daptomycin and discharged       Date of Admission:  7/19/2025  Assessment & Plan     Surgical site infection  S/p right total hip arthroplasty in April 2025  S/p incision and drainage of the right hip by MD Johann 7/21/2025  On treatment with ceftriaxone 2 g daily  Aspirate from the right hip is growing Staph aureus  I will check MRSA in the nose and until then continue the patient on vancomycin  Infectious diseases would like to continue 6 weeks of IV antibiotic therapy followed by 3 months of  oral antibiotic therapy  CRP is decreasing  PICC line was placed 7/23/2025  Awaiting discharge   Should the creatinine continue to be elevated by tomorrow 7/27/2025 patient will be started on daptomycin and discharged    #History of mild mental retardation  -Patient mentioned that he lives independently at his own apartment with a close follow-up and visit from   -Social service following with us.  - Patient has a listed guardian     #History of bronchial asthma  -Not in exacerbation     #History of Patel's esophagus  #History of GERD  -Continued on home regimen of PPI     #History of benign essential hypertension   -Home regimen of prazosin was resumed     #History of CKD  -Appears to be at baseline and continue to monitor    Gout, hyperuricacidemia   Recently treated for gout with colchicine, steroid burst.  Should be on allopurinol so will resume.  Patient had not been taking at home     #History of depression  - On Cymbalta and Zyprexa     #History of morbidly obese  #History of LOREN    Plan:   Discharge if creatinine is improving or change antibiotic to daptomycin as recommended by ID as following Creatinine still elevated, if comes down tomorrow home on the same Vanco dose with close monitoring, if it is either rising or the same level probably switch to daptomycin is a long-term antibiotic     BMP in am     Will add rifampin      Diet: Advance Diet as Tolerated: Regular Diet Adult  Diet    DVT Prophylaxis: Enoxaparin (Lovenox) SQ  Germain Catheter: Not present  Code Status: Full Code         Medically Ready for Discharge: Anticipated Tomorrow    Clinically Significant Risk Factors               # Hypoalbuminemia: Lowest albumin = 3.1 g/dL at 7/19/2025  2:31 PM, will monitor as appropriate       # Hypertension: Noted on problem list            # Morbid Obesity: Estimated body mass index is 47.6 kg/m  as calculated from the following:    Height as of this encounter: 1.829 m (6').    Weight as of this  encounter: 159.2 kg (350 lb 15.6 oz).      # Asthma: noted on problem list              The patient's care was discussed with the Patient .    Michael Ventura MD  Hospitalist Service  [facility]    ______________________________________________________________________    Interval History     Symptoms   No new symptoms    Review of Systems:   As above     Data reviewed today: I reviewed all medications, new labs and imaging results over the last 24 hours.     Physical Exam   Vital Signs: Temp: 97.2  F (36.2  C) Temp src: Temporal BP: (!) 153/83 Pulse: 82   Resp: 16 SpO2: 96 % O2 Device: None (Room air)    Weight: 350 lbs 15.56 oz      GENERAL: Patient is not in acute distress  HEENT: EOM+,Conjunctiva is clear   NECK:  no Jugular Venous distention  HEART: S1 S2 regular Rate and Rhythm, there is  no murmur,   LUNGS: Respirations are  not laboured, Lungs are  clear to auscultation without Crepitations or Wheezing   ABDOMEN: Soft , there is no tenderness , Bowel Sounds are  Positive   LOWER LIMBS: no Pedal Edema  Bilaterally I did not examine the right hip postoperative site  CNS:  Alert,  Oriented x 3, Moving all the Four Limbs     Data   Recent Labs   Lab 07/26/25  0547 07/25/25  0640 07/24/25  0504 07/23/25  1524 07/23/25  0552 07/22/25  0735 07/21/25  1206 07/21/25  0635 07/20/25  1209 07/20/25  0721   WBC  --   --   --   --  9.0 15.7*  --   --   --  15.1*   HGB  --   --   --   --  7.6* 8.9*  8.9* 9.7*  --   --  9.7*   MCV  --   --  86  --  85  85 84  84 85  --   --  84   PLT  --   --  319  --   --  326  --   --   --  318   INR  --   --   --   --   --   --  1.20*  --  1.09  --      --   --   --   --  138  --   --   --  138   POTASSIUM 4.2  --   --   --   --  5.1  --   --   --  3.9   CHLORIDE 106  --   --   --   --  104  --   --   --  104   CO2 25  --   --   --   --  23  --   --   --  24   BUN 15.0  --   --   --   --  16.0  --   --   --  12.2   CR 1.50* 1.48* 1.41*   < >  --  1.16  --    --   --  1.06   ANIONGAP 11  --   --   --   --  11  --   --   --  10   FILEMON 9.1  --   --   --   --  8.7*  --   --   --  9.1   GLC 80  --   --   --   --  111*  --  72  --  119*    < > = values in this interval not displayed.         No results found for this or any previous visit (from the past 24 hours).

## 2025-07-26 NOTE — PROVIDER NOTIFICATION
07/25/25 2301   Tech Time   $Tech Time (10 minute increments) 3   Mode: CPAP/ BiPAP/ AVAPS/ AVAPS AE   CPAP/BiPAP/ AVAPS/ AVAPS AE Mode CPAP   Equipment   Device Resmed   CPAP/BiPAP/Settings   $CPAP/BiPAP Subsequent completed   BIPAP/CPAP On Standby On   Oxygen (%) 21   CPAP/BiPAP Patient Parameters   CPAP (cm H2O)   (5-20)   RR Total (breaths/min) 18 breaths/min   CPAP/BiPAP/AVAPS/AVAPS AE Alarms   Humidifier Checked N/A   RT Device Skin Assessment   Oxygen Delivery Device CPAP/BiPAP Mask   Interface Face Mask - Medium   Ventilator Arm In Place No   Site Appearance neck circumference Clean and dry   Site Appearance bridge of nose Clean and dry   Site Appearance occiput Clean and dry   Strap Tightness Finger Allowance between head and device strap   Device Skin Interventions Taken No adjustments needed   Respiratory WDL   Respiratory WDL X   Rhythm/Pattern, Respiratory assisted mechanically   Expansion/Accessory Muscles/Retractions expansion symmetric;no retractions;no use of accessory muscles     Marlo Martinez, RT on 7/25/2025 at 11:03 PM

## 2025-07-26 NOTE — PLAN OF CARE
"Goal Outcome Evaluation:      Plan of Care Reviewed With: patient    Overall Patient Progress: improvingOverall Patient Progress: improving    Outcome Evaluation: VSS. On RA. A&Ox4. SBA with GB/W. Tolerating regular diet. PICC in place - blood return and SL. CHG wipes completed. Contact precautions maintained. Prevena in place. Voiding via urinal. On vanco. On Lovenox. Pain managed with PRN oxycodone. Plan is discharge home with home infusion for 6 weeks of antibiotics once creatinine levels have decreased. ID and SW following.        Problem: Adult Inpatient Plan of Care  Goal: Plan of Care Review  Description: The Plan of Care Review/Shift note should be completed every shift.  The Outcome Evaluation is a brief statement about your assessment that the patient is improving, declining, or no change.  This information will be displayed automatically on your shift  note.  Outcome: Progressing  Flowsheets (Taken 7/26/2025 1120)  Outcome Evaluation: VSS. On RA. A&Ox4. SBA with GB/W. Tolerating regular diet. PICC in place - blood return and SL. CHG wipes completed. Contact precautions maintained. Prevena in place. Voiding via urinal. On vanco. On Lovenox. Pain managed with PRN oxycodone. Plan is discharge home with home infusion for 6 weeks of antibiotics once creatinine levels have decreased. ID and SW following.  Plan of Care Reviewed With: patient  Overall Patient Progress: improving  Goal: Patient-Specific Goal (Individualized)  Description: You can add care plan individualizations to a care plan. Examples of Individualization might be:  \"Parent requests to be called daily at 9am for status\", \"I have a hard time hearing out of my right ear\", or \"Do not touch me to wake me up as it startles  me\".  Outcome: Progressing  Goal: Absence of Hospital-Acquired Illness or Injury  Outcome: Progressing  Intervention: Identify and Manage Fall Risk  Recent Flowsheet Documentation  Taken 7/26/2025 0820 by Dayday Mendez RN  Safety " Promotion/Fall Prevention:   activity supervised   assistive device/personal items within reach   clutter free environment maintained   mobility aid in reach   nonskid shoes/slippers when out of bed   patient and family education   room organization consistent   safety round/check completed   supervised activity   room near nurse's station  Intervention: Prevent Skin Injury  Recent Flowsheet Documentation  Taken 7/26/2025 0820 by Dayday Mendez RN  Body Position: position changed independently  Skin Protection:   adhesive use limited   incontinence pads utilized  Intervention: Prevent and Manage VTE (Venous Thromboembolism) Risk  Recent Flowsheet Documentation  Taken 7/26/2025 0820 by Dayday Mendez RN  VTE Prevention/Management: (pt ambulating in room)   SCDs off (sequential compression devices)   patient refused intervention  Intervention: Prevent Infection  Recent Flowsheet Documentation  Taken 7/26/2025 0820 by Dayday Mendez RN  Infection Prevention:   hand hygiene promoted   rest/sleep promoted   single patient room provided  Goal: Optimal Comfort and Wellbeing  Outcome: Progressing  Intervention: Monitor Pain and Promote Comfort  Recent Flowsheet Documentation  Taken 7/26/2025 0820 by Dayday Mendez RN  Pain Management Interventions:   declines   care clustered  Goal: Readiness for Transition of Care  Outcome: Progressing     Problem: Infection  Goal: Absence of Infection Signs and Symptoms  Outcome: Progressing  Intervention: Prevent or Manage Infection  Recent Flowsheet Documentation  Taken 7/26/2025 0820 by Dayday Mendez RN  Isolation Precautions: contact precautions maintained     Problem: Pain Acute  Goal: Optimal Pain Control and Function  Outcome: Progressing  Intervention: Develop Pain Management Plan  Recent Flowsheet Documentation  Taken 7/26/2025 0820 by Dayday Mendez RN  Pain Management Interventions:   declines   care clustered  Intervention: Prevent or Manage Pain  Recent Flowsheet Documentation  Taken  7/26/2025 0820 by Dayday Mendez, RN  Medication Review/Management: medications reviewed

## 2025-07-26 NOTE — PROGRESS NOTES
Orthopedic Surgery  7/26/2025    S: Patient voices no complaints today.     O: Blood pressure (!) 153/83, pulse 82, temperature 97.2  F (36.2  C), temperature source Temporal, resp. rate 16, height 1.829 m (6'), weight (!) 159.2 kg (350 lb 15.6 oz), SpO2 96%.  Lab Results   Component Value Date    HGB 7.6 07/23/2025    HGB 13.4 10/26/2020     Lab Results   Component Value Date    INR 1.20 07/21/2025    INR 0.92 01/09/2017        Neurovascularly intact.  Calves are negative bilaterally, both soft and nontender.  The wound is C/D/I.  The wound looks good with minimal erythema of the surrounding skin.    A: Mr. Greenwood is doing well status post Procedure(s):  Incision and drainage hip, combined, with revision of head and acetabular liner.    P: Continue physical therapy.  Antibiotics per ID: vanco, rifampin at discharge  Renal: following creatinine   Anticoagulation with lovenox  Pain management, well controlled  Discharge planning, when medically stable    Bala Randall MD  [phone]

## 2025-07-27 ENCOUNTER — APPOINTMENT (OUTPATIENT)
Dept: ULTRASOUND IMAGING | Facility: CLINIC | Age: 45
DRG: 464 | End: 2025-07-27
Attending: HOSPITALIST
Payer: COMMERCIAL

## 2025-07-27 LAB
ANION GAP SERPL CALCULATED.3IONS-SCNC: 9 MMOL/L (ref 7–15)
BUN SERPL-MCNC: 15.2 MG/DL (ref 6–20)
CALCIUM SERPL-MCNC: 9.1 MG/DL (ref 8.8–10.4)
CHLORIDE SERPL-SCNC: 107 MMOL/L (ref 98–107)
CK SERPL-CCNC: 7 U/L (ref 39–308)
CREAT SERPL-MCNC: 1.51 MG/DL (ref 0.67–1.17)
CREAT UR-MCNC: 23.6 MG/DL
EGFRCR SERPLBLD CKD-EPI 2021: 58 ML/MIN/1.73M2
GLUCOSE SERPL-MCNC: 110 MG/DL (ref 70–99)
HCO3 SERPL-SCNC: 26 MMOL/L (ref 22–29)
MCV RBC AUTO: 85 FL (ref 78–100)
PLATELET # BLD AUTO: 366 10E3/UL (ref 150–450)
POTASSIUM SERPL-SCNC: 4.2 MMOL/L (ref 3.4–5.3)
SODIUM SERPL-SCNC: 142 MMOL/L (ref 135–145)
SODIUM UR-SCNC: 24 MMOL/L

## 2025-07-27 PROCEDURE — 82550 ASSAY OF CK (CPK): CPT | Performed by: INTERNAL MEDICINE

## 2025-07-27 PROCEDURE — 82310 ASSAY OF CALCIUM: CPT | Performed by: INTERNAL MEDICINE

## 2025-07-27 PROCEDURE — 76770 US EXAM ABDO BACK WALL COMP: CPT

## 2025-07-27 PROCEDURE — 250N000011 HC RX IP 250 OP 636: Performed by: INTERNAL MEDICINE

## 2025-07-27 PROCEDURE — 250N000013 HC RX MED GY IP 250 OP 250 PS 637: Performed by: INTERNAL MEDICINE

## 2025-07-27 PROCEDURE — 120N000001 HC R&B MED SURG/OB

## 2025-07-27 PROCEDURE — 250N000011 HC RX IP 250 OP 636: Performed by: ORTHOPAEDIC SURGERY

## 2025-07-27 PROCEDURE — 258N000003 HC RX IP 258 OP 636: Performed by: HOSPITALIST

## 2025-07-27 PROCEDURE — 94660 CPAP INITIATION&MGMT: CPT

## 2025-07-27 PROCEDURE — 250N000013 HC RX MED GY IP 250 OP 250 PS 637: Performed by: ORTHOPAEDIC SURGERY

## 2025-07-27 PROCEDURE — 85049 AUTOMATED PLATELET COUNT: CPT | Performed by: HOSPITALIST

## 2025-07-27 PROCEDURE — 99232 SBSQ HOSP IP/OBS MODERATE 35: CPT | Performed by: HOSPITALIST

## 2025-07-27 PROCEDURE — 258N000003 HC RX IP 258 OP 636: Performed by: INTERNAL MEDICINE

## 2025-07-27 PROCEDURE — 999N000157 HC STATISTIC RCP TIME EA 10 MIN

## 2025-07-27 PROCEDURE — 82570 ASSAY OF URINE CREATININE: CPT | Performed by: HOSPITALIST

## 2025-07-27 PROCEDURE — 99232 SBSQ HOSP IP/OBS MODERATE 35: CPT | Performed by: INTERNAL MEDICINE

## 2025-07-27 PROCEDURE — 84300 ASSAY OF URINE SODIUM: CPT | Performed by: HOSPITALIST

## 2025-07-27 RX ORDER — RIFAMPIN 300 MG/1
300 CAPSULE ORAL EVERY 12 HOURS
Qty: 84 CAPSULE | Refills: 0 | Status: ON HOLD | OUTPATIENT
Start: 2025-07-27 | End: 2025-09-07

## 2025-07-27 RX ADMIN — HYDROXYZINE HYDROCHLORIDE 25 MG: 25 TABLET, FILM COATED ORAL at 15:48

## 2025-07-27 RX ADMIN — SODIUM CHLORIDE, SODIUM LACTATE, POTASSIUM CHLORIDE, AND CALCIUM CHLORIDE: .6; .31; .03; .02 INJECTION, SOLUTION INTRAVENOUS at 18:06

## 2025-07-27 RX ADMIN — ENOXAPARIN SODIUM 40 MG: 40 INJECTION SUBCUTANEOUS at 22:04

## 2025-07-27 RX ADMIN — Medication 200 MG: at 08:02

## 2025-07-27 RX ADMIN — MIRABEGRON 50 MG: 50 TABLET, EXTENDED RELEASE ORAL at 08:02

## 2025-07-27 RX ADMIN — RIFAMPIN 300 MG: 300 CAPSULE ORAL at 08:04

## 2025-07-27 RX ADMIN — DULOXETINE 120 MG: 60 CAPSULE, DELAYED RELEASE ORAL at 08:02

## 2025-07-27 RX ADMIN — OLANZAPINE 15 MG: 10 TABLET, FILM COATED ORAL at 22:03

## 2025-07-27 RX ADMIN — FAMOTIDINE 20 MG: 20 TABLET, FILM COATED ORAL at 22:03

## 2025-07-27 RX ADMIN — PANTOPRAZOLE SODIUM 40 MG: 40 TABLET, DELAYED RELEASE ORAL at 08:02

## 2025-07-27 RX ADMIN — SENNOSIDES AND DOCUSATE SODIUM 1 TABLET: 50; 8.6 TABLET ORAL at 08:02

## 2025-07-27 RX ADMIN — FAMOTIDINE 20 MG: 20 TABLET, FILM COATED ORAL at 08:02

## 2025-07-27 RX ADMIN — RIFAMPIN 300 MG: 300 CAPSULE ORAL at 22:04

## 2025-07-27 RX ADMIN — CHOLECALCIFEROL TAB 10 MCG (400 UNIT) 10 MCG: 10 TAB at 08:02

## 2025-07-27 RX ADMIN — ENOXAPARIN SODIUM 40 MG: 40 INJECTION SUBCUTANEOUS at 08:02

## 2025-07-27 RX ADMIN — OXYBUTYNIN CHLORIDE 30 MG: 5 TABLET, EXTENDED RELEASE ORAL at 08:02

## 2025-07-27 RX ADMIN — VANCOMYCIN HYDROCHLORIDE 1250 MG: 5 INJECTION, POWDER, LYOPHILIZED, FOR SOLUTION INTRAVENOUS at 09:42

## 2025-07-27 RX ADMIN — DAPTOMYCIN 650 MG: 500 INJECTION, POWDER, LYOPHILIZED, FOR SOLUTION INTRAVENOUS at 15:58

## 2025-07-27 RX ADMIN — ALLOPURINOL 300 MG: 300 TABLET ORAL at 08:02

## 2025-07-27 RX ADMIN — POLYETHYLENE GLYCOL 3350 17 G: 17 POWDER, FOR SOLUTION ORAL at 08:02

## 2025-07-27 RX ADMIN — SENNOSIDES AND DOCUSATE SODIUM 1 TABLET: 50; 8.6 TABLET ORAL at 22:04

## 2025-07-27 RX ADMIN — PRAZOSIN HYDROCHLORIDE 3 MG: 1 CAPSULE ORAL at 22:02

## 2025-07-27 RX ADMIN — SODIUM CHLORIDE, SODIUM LACTATE, POTASSIUM CHLORIDE, AND CALCIUM CHLORIDE: .6; .31; .03; .02 INJECTION, SOLUTION INTRAVENOUS at 04:30

## 2025-07-27 RX ADMIN — FERROUS SULFATE TAB 325 MG (65 MG ELEMENTAL FE) 325 MG: 325 (65 FE) TAB at 08:02

## 2025-07-27 RX ADMIN — OXYCODONE HYDROCHLORIDE 10 MG: 10 TABLET ORAL at 09:42

## 2025-07-27 ASSESSMENT — ACTIVITIES OF DAILY LIVING (ADL)
ADLS_ACUITY_SCORE: 41
ADLS_ACUITY_SCORE: 42
ADLS_ACUITY_SCORE: 41
ADLS_ACUITY_SCORE: 42
ADLS_ACUITY_SCORE: 41
ADLS_ACUITY_SCORE: 42
ADLS_ACUITY_SCORE: 42
ADLS_ACUITY_SCORE: 41
ADLS_ACUITY_SCORE: 42

## 2025-07-27 NOTE — PROGRESS NOTES
Orthopedic Surgery  7/27/2025    S: Patient voices no complaints today. No events overnight    O: Blood pressure 139/70, pulse 81, temperature 97  F (36.1  C), temperature source Temporal, resp. rate 16, height 1.829 m (6'), weight (!) 159.2 kg (350 lb 15.6 oz), SpO2 96%.  Lab Results   Component Value Date    HGB 7.6 07/23/2025    HGB 13.4 10/26/2020     Lab Results   Component Value Date    INR 1.20 07/21/2025    INR 0.92 01/09/2017        Calves are negative bilaterally, both soft and nontender.  The wound is C/D/I.  The wound looks good with minimal erythema of the surrounding skin.  CMS intact R foot    A: Mr. Greenwood is doing well status post Procedure(s):  Incision and drainage hip, combined, with revision of head and acetabular liner.    P: Continue physical therapy.  Antibiotics per ID: vanco, rifampin at discharge  Renal: following creatinine   Anticoagulation with lovenox  Pain management, well controlled  Discharge planning, when medically stable

## 2025-07-27 NOTE — PROGRESS NOTES
[facility]    Hospitalist Progress Note             Date of Admission:  7/19/2025                   Day of hospitalization: 8    Assessment and Plan:   44 year old gentleman with asthma, Patel's esophagus, GERD, hypertension, depression, mild cognitive impairment, chronic kidney disease, gout, morbid obesity, obstructive sleep apnea, overactive bladder, iron deficiency anemia and right total hip arthroplasty done in April 2025 who came into [facility] 7/19/2025 with fall and symptoms of infection of the right hip associated with progressively worsening edema redness and tenderness of the right hip to a point that he was unable to bear weight on it.  He was seen at [facility] urgent care on 7/11/2025 and started on doxycycline and then later again put on Augmentin 7/16/2025.  I did mention CRP was 130 WBC 17.7 hemoglobin 10.2 lactic acid was normal at 0.8 on 7/21/2025 he had incision and drainage of the right hip with revision of the head and acetabular liner by Dr. Bala Randall MD aspirate from the right hip grew Staph aureus he is on ceftriaxone 2 g daily which has since been discontinued and the patient is on vancomycin since 7/21/2025.  PICC line is placed for IV antibiotic treatment which is being planned for 6 weeks followed by 3 more months of oral antibiotic therapy as advised by ID.  Creatinine is monitored with vancomycin dosing and it is decided by ID that should the creatinine continue to be elevated by tomorrow 7/27/2025 patient will be started on daptomycin and discharged       Date of Admission:  7/19/2025  Assessment & Plan  Surgical site infection  S/p right total hip arthroplasty in April 2025  -S/p incision and drainage of the right hip by MD Johann 7/21/2025  -On treatment with ceftriaxone 2 g daily  -Aspirate from the right hip is growing MRSA  -Initially treated with vancomycin, changed to Daptomycin given renal dysfunction  -Infectious diseases would  like to continue 6 weeks of IV antibiotic therapy followed by 3 months of oral antibiotic therapy  CRP is decreasing  PICC line was placed 7/23/2025  Creatinine is mildly elevated today ID is okay for discharge and have changed antibiotics to Daptomycin  -will monitor creatine one more day, if improves, likely can be discharged. Home infusion ordered    CORINE  -suspect related to vancomycin, appears to have Plateau, will monitor for one more day and discharge if remains stable  -currently on LR at 100 ml/hour, noticeable lower extremity edema, will change to 75 ml/hour  -Bladder scan ordered, along with urine studies, and renal US    #History of mild cognitive impairment  -Patient mentioned that he lives independently at his own apartment with a close follow-up and visit from   -Social service following with us.  - Patient has a listed guardian     #History of bronchial asthma  -Not in exacerbation     #History of Patel's esophagus  #History of GERD  -Continued on home regimen of PPI     #History of benign essential hypertension   -Home regimen of prazosin was resumed     #History of CKD  -Appears to be at baseline and continue to monitor    #Gout, hyperuricacidemia   Recently treated for gout with colchicine, steroid burst.  Should be on allopurinol so will resume.  Patient had not been taking at home     #History of depression  - On Cymbalta and Zyprexa     #History of morbidly obese  #History of LOREN    # Code status: Full   # DVT: Lovenox  # IVF: none           Medically Ready for Discharge: Anticipated Tomorrow                    Eloise Taylor MD    Subjective   Chief Complaint:  Hip pain  Subjective: Doing well minimal complaints no chest pain shortness of breath nausea vomiting abdominal pain. Not happy about being in hospital one more day          Objective   BP (!) 143/74 (BP Location: Left arm)   Pulse 72   Temp 97.8  F (36.6  C) (Tympanic)   Resp 18   Ht 1.829 m (6')   Wt (!) 159.2 kg (350  "lb 15.6 oz)   SpO2 100%   BMI 47.60 kg/m       Physical Exam  General: Pt in NAD, normal appearance  HEENT: OP clear MMM, no JVD  Lungs: Clear to Auscultation Bilateral, normal breathing  without accessory muscle usage, no wheezing, rhonchi or crackles  Cardiac: +S1, S2, RRR, no MRG, trace lower extremity edema   Abdominal: normal bowel sounds, NT/ND  Skin: warm, dry, normal turgor, no rash  Psyche: A& O x3, appropriate affect             Intake/Output Summary (Last 24 hours) at 7/27/2025 1306  Last data filed at 7/27/2025 1305  Gross per 24 hour   Intake 940 ml   Output 3355 ml   Net -2415 ml           Labs and Imaging Results:      Recent Labs   Lab 07/27/25  0535 07/24/25  0504 07/23/25  0552 07/22/25  0735   WBC  --   --  9.0 15.7*   HGB  --   --  7.6* 8.9*  8.9*    319  --  326        Recent Labs   Lab 07/27/25  0535 07/26/25  0547    142   CO2 26 25   BUN 15.2 15.0        Recent Labs   Lab 07/21/25  1206   INR 1.20*   PTT 35      No results for input(s): \"CKMB\" in the last 168 hours.    Invalid input(s): \"TROPONINT\"   No results for input(s): \"ALBUMIN\", \"AST\", \"ALT\", \"ALKPHOS\", \"BILITOT\" in the last 168 hours.     Micro:     Radio:  XR Pelvis w Hip Port Right 1 View   Final Result   IMPRESSION: Bilateral total hip arthroplasties. Excellent position/alignment. No hardware loosening or periprosthetic fracture evident. Nonspecific radiopaque material lateral to the greater trochanter, new and perhaps related to antibiotic impregnated    beads.         XR Pelvis and Hip Right 2 Views   Final Result   IMPRESSION: Right total hip arthroplasty. No periprosthetic fracture or concerning lucency. Small amount of cystic change in the acetabulum present on preoperative CT. Left total hip arthroplasty is noted. 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 Right sacral nerve stimulator. Contrast opacifies the collecting system. Mottled gas present lateral to the hip corresponding " to fluid collection identified on CT.       Foot XR, G/E 3 views, left   Final Result   IMPRESSION:   Left foot and ankle: Hallux valgus with mild 1st MTP joint arthrosis. Small well-corticated bone fragment along the lateral aspect of the 1st MTP joint space is chronic in appearance. There is no evidence of an acute displaced left foot or ankle    fracture. Hammertoe deformities. Ankle mortise is intact.      Right ankle: Right ankle shows circumferential soft tissue swelling. Ankle mortise is intact. There is no evidence of an acute displaced fracture. Small calcaneal heel spur.      XR Ankle Bilateral G/E 3 Views   Final Result   IMPRESSION:   Left foot and ankle: Hallux valgus with mild 1st MTP joint arthrosis. Small well-corticated bone fragment along the lateral aspect of the 1st MTP joint space is chronic in appearance. There is no evidence of an acute displaced left foot or ankle    fracture. Hammertoe deformities. Ankle mortise is intact.      Right ankle: Right ankle shows circumferential soft tissue swelling. Ankle mortise is intact. There is no evidence of an acute displaced fracture. Small calcaneal heel spur.      CT Hip Right w Contrast   Final Result   IMPRESSION:   1.  Right total hip arthroplasty. Interval increase in size of large right hip fluid and gas collection extending from the lateral skin surface through the subcutaneous tissues and lateral hip fascia likely contiguous with the joint space. Aspiration is    recommended.                    Medications:      Scheduled Meds:    Current Facility-Administered Medications   Medication Dose Route Frequency Provider Last Rate Last Admin    allopurinol (ZYLOPRIM) tablet 300 mg  300 mg Oral Daily Bala Randall MD   300 mg at 07/27/25 0802    cholecalciferol (VITAMIN D3) tablet 10 mcg  10 mcg Oral Daily Bala Randall MD   10 mcg at 07/27/25 0802    DAPTOmycin (CUBICIN) 650 mg in sodium chloride 0.9 % 100 mL intermittent  infusion  6 mg/kg (Adjusted) Intravenous Q24H Didier Andrade MD        DULoxetine (CYMBALTA) DR capsule 120 mg  120 mg Oral Daily Bala Randall MD   120 mg at 07/27/25 0802    enoxaparin ANTICOAGULANT (LOVENOX) injection 40 mg  40 mg Subcutaneous Q12H Bala Randall MD   40 mg at 07/27/25 0802    famotidine (PEPCID) tablet 20 mg  20 mg Oral BID Bala Randall MD   20 mg at 07/27/25 0802    Or    famotidine (PEPCID) injection 20 mg  20 mg Intravenous BID Bala Randall MD        ferrous sulfate (FEROSUL) tablet 325 mg  325 mg Oral Daily with breakfast Bala Randall MD   325 mg at 07/27/25 0802    hydrocortisone 2.5 % cream   Topical BID Bala Randall MD   Given at 07/22/25 0815    magnesium oxide (MAG-OX) half-tab 200 mg  200 mg Oral Daily Bala Randall MD   200 mg at 07/27/25 0802    mirabegron (MYRBETRIQ) 24 hr tablet 50 mg  50 mg Oral Daily Bala Randall MD   50 mg at 07/27/25 0802    OLANZapine (zyPREXA) tablet 15 mg  15 mg Oral At Bedtime Bala Randall MD   15 mg at 07/26/25 2209    oxyBUTYnin ER (DITROPAN XL) 24 hr tablet 30 mg  30 mg Oral Daily Bala Randall MD   30 mg at 07/27/25 0802    pantoprazole (PROTONIX) EC tablet 40 mg  40 mg Oral QAM AC Bala Randall MD   40 mg at 07/27/25 0802    polyethylene glycol (MIRALAX) Packet 17 g  17 g Oral Daily Bala Randall MD   17 g at 07/27/25 0802    prazosin (MINIPRESS) capsule 3 mg  3 mg Oral At Bedtime aBla Randall MD   3 mg at 07/26/25 2212    rifampin (RIFADIN) capsule 300 mg  300 mg Oral Q12H GIOVANY (08/20) Michael Ventura MD   300 mg at 07/27/25 0804    senna-docusate (SENOKOT-S/PERICOLACE) 8.6-50 MG per tablet 1 tablet  1 tablet Oral BID Bala Randall MD   1 tablet at 07/27/25 0802    sodium chloride (PF) 0.9% PF flush 3 mL  3 mL Intracatheter Q8H Bala Randall MD   3 mL at 07/26/25 0547      Continuous Infusions:    Current Facility-Administered Medications   Medication Dose Route Frequency Provider Last Rate Last Admin    lactated ringers infusion   Intravenous Continuous Michael Ventura  mL/hr at 07/27/25 0811 Rate Verify at 07/27/25 0811     PRN Meds:    Current Facility-Administered Medications   Medication Dose Route Frequency Provider Last Rate Last Admin    benzocaine-menthol (CHLORASEPTIC) 6-10 MG lozenge 1 lozenge  1 lozenge Buccal Q1H PRN Bala Randall MD        bisacodyl (DULCOLAX) suppository 10 mg  10 mg Rectal Daily PRN Bala Randall MD        calcium carbonate (TUMS) chewable tablet 1,000 mg  1,000 mg Oral 4x Daily PRN Bala Randall MD        diphenhydrAMINE (BENADRYL) capsule 25 mg  25 mg Oral Q6H PRN Bala Randall MD        HYDROmorphone (DILAUDID) injection 0.2 mg  0.2 mg Intravenous Q2H PRN Bala Randall MD        Or    HYDROmorphone (DILAUDID) injection 0.4 mg  0.4 mg Intravenous Q2H PRN Bala Randall MD   0.4 mg at 07/24/25 0038    hydrOXYzine HCl (ATARAX) tablet 25 mg  25 mg Oral Q6H PRN Bala Randall MD   25 mg at 07/25/25 1710    ketoconazole (NIZORAL) 2 % cream   Topical BID PRN Bala Randall MD        ketoconazole (NIZORAL) 2 % shampoo   Topical Daily PRN Bala Randall MD        lidocaine (LMX4) cream   Topical Q1H PRN Bala Randall MD        lidocaine 1 % 0.1-1 mL  0.1-1 mL Other Q1H PRN aBla Randall MD        loperamide (IMODIUM) capsule 2 mg  2 mg Oral 4x Daily PRN Bala Randall MD        magnesium hydroxide (MILK OF MAGNESIA) suspension 30 mL  30 mL Oral Daily PRN Bala Randall MD        naloxone (NARCAN) injection 0.2 mg  0.2 mg Intravenous Q2 Min PRN Bala Randall MD        Or    naloxone (NARCAN) injection 0.4 mg  0.4 mg Intravenous Q2 Min PRN Bala Randall MD        Or    naloxone  (NARCAN) injection 0.2 mg  0.2 mg Intramuscular Q2 Min PRN Bala Randall MD        Or    naloxone (NARCAN) injection 0.4 mg  0.4 mg Intramuscular Q2 Min PRN Bala Randall MD        ondansetron (ZOFRAN ODT) ODT tab 4 mg  4 mg Oral Q6H PRN Bala Randall MD        Or    ondansetron (ZOFRAN) injection 4 mg  4 mg Intravenous Q6H PRN Bala Randall MD        oxyCODONE (ROXICODONE) tablet 5 mg  5 mg Oral Q4H PRN Bala Randall MD   5 mg at 07/23/25 0845    Or    oxyCODONE IR (ROXICODONE) tablet 10 mg  10 mg Oral Q4H PRN Bala Randall MD   10 mg at 07/27/25 0942    prochlorperazine (COMPAZINE) injection 10 mg  10 mg Intravenous Q6H PRN Bala Randall MD        Or    prochlorperazine (COMPAZINE) tablet 10 mg  10 mg Oral Q6H PRN Bala Randall MD        sodium chloride (PF) 0.9% PF flush 3 mL  3 mL Intracatheter q1 min prn Bala Randall MD   3 mL at 07/27/25 0802

## 2025-07-27 NOTE — PLAN OF CARE
"Goal Outcome Evaluation:      Plan of Care Reviewed With: patient    Overall Patient Progress: improvingOverall Patient Progress: improving    Outcome Evaluation: VSS on room air. AOX4. Up SBA with gait belt and walker. IVF started per MD order. PICC dressing clean dry and intact. Pain managed with Oxycodone. Appetite adequate. Voiding adequately. Right hip dressing with Prevena wound vac intact, drainage serosanguainous, CMS intact. D/c home wiht home infusion when medically ready.          Problem: Adult Inpatient Plan of Care  Goal: Plan of Care Review  Description: The Plan of Care Review/Shift note should be completed every shift.  The Outcome Evaluation is a brief statement about your assessment that the patient is improving, declining, or no change.  This information will be displayed automatically on your shift  note.  Outcome: Progressing  Flowsheets (Taken 7/26/2025 2970)  Outcome Evaluation: VSS on room air. AOX4. Up SBA with gait belt and walker. IVF started per MD order. PICC dressing clean dry and intact. Pain managed with Oxycodone. Appetite adequate. Voiding adequately. Right hip dressing with Prevena wound vac intact, drainage serosanguainous, CMS intact. D/c home wiht home infusion when medically ready.  Plan of Care Reviewed With: patient  Overall Patient Progress: improving  Goal: Patient-Specific Goal (Individualized)  Description: You can add care plan individualizations to a care plan. Examples of Individualization might be:  \"Parent requests to be called daily at 9am for status\", \"I have a hard time hearing out of my right ear\", or \"Do not touch me to wake me up as it startles  me\".  Outcome: Progressing  Goal: Absence of Hospital-Acquired Illness or Injury  Outcome: Progressing  Intervention: Identify and Manage Fall Risk  Recent Flowsheet Documentation  Taken 7/26/2025 1710 by Kym Nolasco RN  Safety Promotion/Fall Prevention:   activity supervised   assistive device/personal items within " reach   clutter free environment maintained   increased rounding and observation   nonskid shoes/slippers when out of bed   safety round/check completed  Intervention: Prevent Skin Injury  Recent Flowsheet Documentation  Taken 7/26/2025 1710 by Kym Nolasco RN  Body Position: position changed independently  Skin Protection: adhesive use limited  Intervention: Prevent and Manage VTE (Venous Thromboembolism) Risk  Recent Flowsheet Documentation  Taken 7/26/2025 1710 by Kym Nolasco RN  VTE Prevention/Management: (pt ambulating in room)   SCDs off (sequential compression devices)   patient refused intervention  Intervention: Prevent Infection  Recent Flowsheet Documentation  Taken 7/26/2025 1710 by Kym Nolasco RN  Infection Prevention:   hand hygiene promoted   equipment surfaces disinfected   rest/sleep promoted  Goal: Optimal Comfort and Wellbeing  Outcome: Progressing  Intervention: Monitor Pain and Promote Comfort  Recent Flowsheet Documentation  Taken 7/26/2025 2127 by Kym Nolasco RN  Pain Management Interventions: declines  Taken 7/26/2025 1900 by Kym Nolasco RN  Pain Management Interventions: declines  Goal: Readiness for Transition of Care  Outcome: Progressing     Problem: Infection  Goal: Absence of Infection Signs and Symptoms  Outcome: Progressing  Intervention: Prevent or Manage Infection  Recent Flowsheet Documentation  Taken 7/26/2025 1710 by Kym Nolasco RN  Infection Management: aseptic technique maintained  Isolation Precautions: contact precautions maintained     Problem: Pain Acute  Goal: Optimal Pain Control and Function  Outcome: Progressing  Intervention: Develop Pain Management Plan  Recent Flowsheet Documentation  Taken 7/26/2025 2127 by Kym Nolasco RN  Pain Management Interventions: declines  Taken 7/26/2025 1900 by Kym Nolasco RN  Pain Management Interventions: declines  Intervention: Prevent or Manage Pain  Recent Flowsheet Documentation  Taken 7/26/2025 1710 by Kym Nolasco  RN  Bowel Elimination Promotion:   adequate fluid intake promoted   ambulation promoted   privacy promoted  Medication Review/Management: medications reviewed

## 2025-07-27 NOTE — PLAN OF CARE
"Goal Outcome Evaluation:      Plan of Care Reviewed With: patient    Overall Patient Progress: improvingOverall Patient Progress: improving    Outcome Evaluation: VSS on RA. AOx4. SBA to bathroom GB/walker. PICC is WDL. No PRNs given. Dressing is CDI. Prevena drain functioning and charged.      Problem: Adult Inpatient Plan of Care  Goal: Plan of Care Review  Description: The Plan of Care Review/Shift note should be completed every shift.  The Outcome Evaluation is a brief statement about your assessment that the patient is improving, declining, or no change.  This information will be displayed automatically on your shift  note.  Outcome: Progressing  Flowsheets (Taken 7/27/2025 0330)  Outcome Evaluation: VSS on RA. AOx4. SBA to bathroom GB/walker. PICC is WDL. No PRNs given. Dressing is CDI. Prevena drain functioning and charged.  Plan of Care Reviewed With: patient  Overall Patient Progress: improving  Goal: Patient-Specific Goal (Individualized)  Description: You can add care plan individualizations to a care plan. Examples of Individualization might be:  \"Parent requests to be called daily at 9am for status\", \"I have a hard time hearing out of my right ear\", or \"Do not touch me to wake me up as it startles  me\".  Outcome: Progressing  Goal: Absence of Hospital-Acquired Illness or Injury  Outcome: Progressing  Intervention: Identify and Manage Fall Risk  Recent Flowsheet Documentation  Taken 7/27/2025 0028 by Wilfredo Cruz, RN  Safety Promotion/Fall Prevention:   activity supervised   assistive device/personal items within reach   clutter free environment maintained   increased rounding and observation   nonskid shoes/slippers when out of bed   safety round/check completed  Intervention: Prevent Skin Injury  Recent Flowsheet Documentation  Taken 7/27/2025 0028 by Wilfredo Cruz, RN  Body Position: position changed independently  Skin Protection: adhesive use limited  Intervention: Prevent and Manage VTE " (Venous Thromboembolism) Risk  Recent Flowsheet Documentation  Taken 7/27/2025 0028 by Wilfredo Cruz, RN  VTE Prevention/Management: (pt ambulating in room)   SCDs off (sequential compression devices)   patient refused intervention  Intervention: Prevent Infection  Recent Flowsheet Documentation  Taken 7/27/2025 0028 by Wilfredo Cruz, RN  Infection Prevention:   hand hygiene promoted   equipment surfaces disinfected   rest/sleep promoted  Goal: Optimal Comfort and Wellbeing  Outcome: Progressing  Intervention: Monitor Pain and Promote Comfort  Recent Flowsheet Documentation  Taken 7/27/2025 0028 by Wilfredo Cruz RN  Pain Management Interventions:   care clustered   medication offered but refused   pain management plan reviewed with patient/caregiver   quiet environment facilitated   rest  Goal: Readiness for Transition of Care  Outcome: Progressing

## 2025-07-27 NOTE — PLAN OF CARE
"Goal Outcome Evaluation:      Plan of Care Reviewed With: patient    Overall Patient Progress: improvingOverall Patient Progress: improving    Outcome Evaluation: VSS. On RA. A&Ox4. SBA with GB/W. PICC in place - good blood return and infusing LR at 100 mL/hr. Prevena in place - draining adequately. Contact precautions maintained. Tolerating regular diet well. On vancomycin - switched to Daptomycin. Pain managed with PRN oxycodone. Plan is discharge home today after antibiotic with home infusion for 6 weeks antibiotics. ID following to determine which antibiotic (Daptomycin).      Problem: Adult Inpatient Plan of Care  Goal: Plan of Care Review  Description: The Plan of Care Review/Shift note should be completed every shift.  The Outcome Evaluation is a brief statement about your assessment that the patient is improving, declining, or no change.  This information will be displayed automatically on your shift  note.  Outcome: Progressing   Flowsheets (Taken 7/27/2025 1001)  Outcome Evaluation: VSS. On RA. A&Ox4. SBA with GB/W. PICC in place - good blood return and infusing LR at 100 mL/hr. Prevena in place - draining adequately. Contact precautions maintained. Tolerating regular diet well. On vancomycin. Pain managed with PRN oxycodone. Plan is discharge home with home infusion today for 6 weeks antibiotics. ID following to determine which antibiotic. SW following to set up transportation.  Plan of Care Reviewed With: patient  Overall Patient Progress: improving  Goal: Patient-Specific Goal (Individualized)  Description: You can add care plan individualizations to a care plan. Examples of Individualization might be:  \"Parent requests to be called daily at 9am for status\", \"I have a hard time hearing out of my right ear\", or \"Do not touch me to wake me up as it startles  me\".  Outcome: Progressing  Goal: Absence of Hospital-Acquired Illness or Injury  Outcome: Progressing  Intervention: Identify and Manage Fall " Risk  Recent Flowsheet Documentation  Taken 7/27/2025 0800 by Dayday Mendez RN  Safety Promotion/Fall Prevention:   activity supervised   assistive device/personal items within reach   clutter free environment maintained   mobility aid in reach   nonskid shoes/slippers when out of bed   patient and family education   room organization consistent   safety round/check completed   supervised activity   room near nurse's station  Intervention: Prevent Skin Injury  Recent Flowsheet Documentation  Taken 7/27/2025 0800 by Dayday Mendez RN  Body Position: position changed independently  Skin Protection:   adhesive use limited   incontinence pads utilized  Intervention: Prevent and Manage VTE (Venous Thromboembolism) Risk  Recent Flowsheet Documentation  Taken 7/27/2025 0800 by Dayday Mendez RN  VTE Prevention/Management: (pt ambulating in room)   SCDs off (sequential compression devices)   patient refused intervention  Intervention: Prevent Infection  Recent Flowsheet Documentation  Taken 7/27/2025 0800 by Dayday Mendez RN  Infection Prevention:   hand hygiene promoted   rest/sleep promoted   single patient room provided  Goal: Optimal Comfort and Wellbeing  Outcome: Progressing  Intervention: Monitor Pain and Promote Comfort  Recent Flowsheet Documentation  Taken 7/27/2025 0800 by Dayday Mendez RN  Pain Management Interventions:   declines   care clustered   distraction  Goal: Readiness for Transition of Care  Outcome: Progressing     Problem: Infection  Goal: Absence of Infection Signs and Symptoms  Outcome: Progressing  Intervention: Prevent or Manage Infection  Recent Flowsheet Documentation  Taken 7/27/2025 0800 by Dayday Mendez RN  Isolation Precautions: contact precautions maintained     Problem: Pain Acute  Goal: Optimal Pain Control and Function  Outcome: Progressing  Intervention: Develop Pain Management Plan  Recent Flowsheet Documentation  Taken 7/27/2025 0800 by Dayday Mendez RN  Pain Management Interventions:    declines   care clustered   distraction  Intervention: Prevent or Manage Pain  Recent Flowsheet Documentation  Taken 7/27/2025 0800 by Dayday Mendez, RN  Medication Review/Management: medications reviewed

## 2025-07-27 NOTE — PROGRESS NOTES
Care Management Follow Up    Length of Stay (days): 8    Expected Discharge Date: 7/28/25     Concerns to be Addressed: all concerns addressed in this encounter     Patient plan of care discussed at interdisciplinary rounds: Yes    Anticipated Discharge Disposition:  home with home infusion and outpatient PT/OT    Patient/family educated on Medicare website which has current facility and service quality ratings:  yes  Education Provided on the Discharge Plan:  yes  Patient/Family in Agreement with the Plan:  yes    Referrals Placed by CM/SW:  home infusion  Private pay costs discussed: Not applicable    Discussed  Partnership in Safe Discharge Planning  document with patient/family: No     Handoff Completed: Yes, [facility] PCP: Internal handoff referral completed    Additional Information:  Per ID, patient will switch from Vanco to Daptomycin today. Per hospitalist, patient will likely be ready for discharge tomorrow 7/28 after monitoring pt's creatinine 1 more day. Patient will need BMP drawn by home care nurse in 1 week. [facility] updated on discharge plan for tomorrow and lab draw.     Next Steps: Arrange for bedside teaching with [facility] prior to discharge, likely tomorrow 7/28.       Delores Zapata RN  Care Coordinator  [facility] [place]  [phone]

## 2025-07-27 NOTE — PROVIDER NOTIFICATION
07/27/25 0028   Tech Time   $Tech Time (10 minute increments) 3   Mode: CPAP/ BiPAP/ AVAPS/ AVAPS AE   CPAP/BiPAP/ AVAPS/ AVAPS AE Mode CPAP   Equipment   Device Resmed   CPAP/BiPAP/Settings   $CPAP/BiPAP Subsequent completed   BIPAP/CPAP On Standby On   Oxygen (%) 21   CPAP/BiPAP Patient Parameters   RR Total (breaths/min) 18 breaths/min   CPAP/BiPAP/AVAPS/AVAPS AE Alarms   Humidifier Checked N/A   RT Device Skin Assessment   Oxygen Delivery Device CPAP/BiPAP Mask   Interface Face Mask - Medium   Ventilator Arm In Place No   Site Appearance neck circumference Clean and dry   Site Appearance bridge of nose Clean and dry   Site Appearance occiput Clean and dry   Strap Tightness Finger Allowance between head and device strap   Device Skin Interventions Taken No adjustments needed   Respiratory WDL   Respiratory WDL X   Rhythm/Pattern, Respiratory assisted mechanically   Expansion/Accessory Muscles/Retractions expansion symmetric;no retractions;no use of accessory muscles     Marlo Martinez, RT on 7/27/2025 at 12:29 AM

## 2025-07-28 ENCOUNTER — APPOINTMENT (OUTPATIENT)
Dept: ULTRASOUND IMAGING | Facility: CLINIC | Age: 45
DRG: 857 | End: 2025-07-28
Attending: STUDENT IN AN ORGANIZED HEALTH CARE EDUCATION/TRAINING PROGRAM
Payer: COMMERCIAL

## 2025-07-28 ENCOUNTER — PATIENT OUTREACH (OUTPATIENT)
Dept: GASTROENTEROLOGY | Facility: CLINIC | Age: 45
End: 2025-07-28
Payer: COMMERCIAL

## 2025-07-28 ENCOUNTER — HOME INFUSION (OUTPATIENT)
Dept: HOME HEALTH SERVICES | Facility: HOME HEALTH | Age: 45
End: 2025-07-28
Payer: COMMERCIAL

## 2025-07-28 ENCOUNTER — APPOINTMENT (OUTPATIENT)
Dept: CT IMAGING | Facility: CLINIC | Age: 45
DRG: 857 | End: 2025-07-28
Attending: EMERGENCY MEDICINE
Payer: COMMERCIAL

## 2025-07-28 ENCOUNTER — HOSPITAL ENCOUNTER (INPATIENT)
Facility: CLINIC | Age: 45
End: 2025-07-28
Attending: EMERGENCY MEDICINE | Admitting: STUDENT IN AN ORGANIZED HEALTH CARE EDUCATION/TRAINING PROGRAM
Payer: COMMERCIAL

## 2025-07-28 ENCOUNTER — TELEPHONE (OUTPATIENT)
Dept: HOME HEALTH SERVICES | Facility: HOME HEALTH | Age: 45
End: 2025-07-28
Payer: COMMERCIAL

## 2025-07-28 ENCOUNTER — HOME INFUSION BILLING (OUTPATIENT)
Dept: HOME HEALTH SERVICES | Facility: HOME HEALTH | Age: 45
End: 2025-07-28
Payer: COMMERCIAL

## 2025-07-28 VITALS
BODY MASS INDEX: 42.66 KG/M2 | DIASTOLIC BLOOD PRESSURE: 79 MMHG | OXYGEN SATURATION: 99 % | TEMPERATURE: 98.3 F | RESPIRATION RATE: 16 BRPM | HEART RATE: 82 BPM | HEIGHT: 72 IN | SYSTOLIC BLOOD PRESSURE: 156 MMHG | WEIGHT: 315 LBS

## 2025-07-28 DIAGNOSIS — Z96.641 S/P TOTAL RIGHT HIP ARTHROPLASTY: ICD-10-CM

## 2025-07-28 DIAGNOSIS — M96.830 POSTOPERATIVE HEMORRHAGE OF MUSCULOSKELETAL STRUCTURE FOLLOWING MUSCULOSKELETAL PROCEDURE: Primary | ICD-10-CM

## 2025-07-28 DIAGNOSIS — L02.415 ABSCESS OF RIGHT HIP: ICD-10-CM

## 2025-07-28 DIAGNOSIS — T84.50XA INFECTION OF PROSTHETIC JOINT, INITIAL ENCOUNTER: Primary | ICD-10-CM

## 2025-07-28 DIAGNOSIS — Z48.89 ENCOUNTER FOR POST SURGICAL WOUND CHECK: ICD-10-CM

## 2025-07-28 DIAGNOSIS — Z12.11 SPECIAL SCREENING FOR MALIGNANT NEOPLASMS, COLON: Primary | ICD-10-CM

## 2025-07-28 LAB
ANION GAP SERPL CALCULATED.3IONS-SCNC: 10 MMOL/L (ref 7–15)
ANION GAP SERPL CALCULATED.3IONS-SCNC: 9 MMOL/L (ref 7–15)
BACTERIA TISS BX CULT: NORMAL
BASOPHILS # BLD AUTO: 0 10E3/UL (ref 0–0.2)
BASOPHILS NFR BLD AUTO: 0 %
BUN SERPL-MCNC: 11.3 MG/DL (ref 6–20)
BUN SERPL-MCNC: 12.2 MG/DL (ref 6–20)
CALCIUM SERPL-MCNC: 9.2 MG/DL (ref 8.8–10.4)
CALCIUM SERPL-MCNC: 9.4 MG/DL (ref 8.8–10.4)
CHLORIDE SERPL-SCNC: 105 MMOL/L (ref 98–107)
CHLORIDE SERPL-SCNC: 106 MMOL/L (ref 98–107)
CREAT SERPL-MCNC: 1.4 MG/DL (ref 0.67–1.17)
CREAT SERPL-MCNC: 1.44 MG/DL (ref 0.67–1.17)
EGFRCR SERPLBLD CKD-EPI 2021: 62 ML/MIN/1.73M2
EGFRCR SERPLBLD CKD-EPI 2021: 64 ML/MIN/1.73M2
EOSINOPHIL # BLD AUTO: 0.3 10E3/UL (ref 0–0.7)
EOSINOPHIL NFR BLD AUTO: 3 %
ERYTHROCYTE [DISTWIDTH] IN BLOOD BY AUTOMATED COUNT: 14.2 % (ref 10–15)
GLUCOSE SERPL-MCNC: 122 MG/DL (ref 70–99)
GLUCOSE SERPL-MCNC: 93 MG/DL (ref 70–99)
HCO3 SERPL-SCNC: 26 MMOL/L (ref 22–29)
HCO3 SERPL-SCNC: 27 MMOL/L (ref 22–29)
HCT VFR BLD AUTO: 23.5 % (ref 40–53)
HGB BLD-MCNC: 7.7 G/DL (ref 13.3–17.7)
IMM GRANULOCYTES # BLD: 0.1 10E3/UL
IMM GRANULOCYTES NFR BLD: 1 %
LYMPHOCYTES # BLD AUTO: 2.1 10E3/UL (ref 0.8–5.3)
LYMPHOCYTES NFR BLD AUTO: 19 %
MCH RBC QN AUTO: 28 PG (ref 26.5–33)
MCHC RBC AUTO-ENTMCNC: 32.8 G/DL (ref 31.5–36.5)
MCV RBC AUTO: 86 FL (ref 78–100)
MONOCYTES # BLD AUTO: 0.8 10E3/UL (ref 0–1.3)
MONOCYTES NFR BLD AUTO: 7 %
NEUTROPHILS # BLD AUTO: 7.7 10E3/UL (ref 1.6–8.3)
NEUTROPHILS NFR BLD AUTO: 70 %
NRBC # BLD AUTO: 0 10E3/UL
NRBC BLD AUTO-RTO: 0 /100
PLATELET # BLD AUTO: 396 10E3/UL (ref 150–450)
POTASSIUM SERPL-SCNC: 3.7 MMOL/L (ref 3.4–5.3)
POTASSIUM SERPL-SCNC: 4.1 MMOL/L (ref 3.4–5.3)
RBC # BLD AUTO: 2.75 10E6/UL (ref 4.4–5.9)
SODIUM SERPL-SCNC: 140 MMOL/L (ref 135–145)
SODIUM SERPL-SCNC: 143 MMOL/L (ref 135–145)
WBC # BLD AUTO: 10.9 10E3/UL (ref 4–11)

## 2025-07-28 PROCEDURE — 99285 EMERGENCY DEPT VISIT HI MDM: CPT | Mod: 25 | Performed by: EMERGENCY MEDICINE

## 2025-07-28 PROCEDURE — 99223 1ST HOSP IP/OBS HIGH 75: CPT | Performed by: STUDENT IN AN ORGANIZED HEALTH CARE EDUCATION/TRAINING PROGRAM

## 2025-07-28 PROCEDURE — 250N000009 HC RX 250: Performed by: EMERGENCY MEDICINE

## 2025-07-28 PROCEDURE — 80048 BASIC METABOLIC PNL TOTAL CA: CPT | Performed by: EMERGENCY MEDICINE

## 2025-07-28 PROCEDURE — 99418 PROLNG IP/OBS E/M EA 15 MIN: CPT | Performed by: STUDENT IN AN ORGANIZED HEALTH CARE EDUCATION/TRAINING PROGRAM

## 2025-07-28 PROCEDURE — 85025 COMPLETE CBC W/AUTO DIFF WBC: CPT | Performed by: EMERGENCY MEDICINE

## 2025-07-28 PROCEDURE — 99285 EMERGENCY DEPT VISIT HI MDM: CPT | Mod: 25

## 2025-07-28 PROCEDURE — 73701 CT LOWER EXTREMITY W/DYE: CPT | Mod: RT

## 2025-07-28 PROCEDURE — 250N000013 HC RX MED GY IP 250 OP 250 PS 637: Performed by: ORTHOPAEDIC SURGERY

## 2025-07-28 PROCEDURE — 94660 CPAP INITIATION&MGMT: CPT

## 2025-07-28 PROCEDURE — 99239 HOSP IP/OBS DSCHRG MGMT >30: CPT | Performed by: INTERNAL MEDICINE

## 2025-07-28 PROCEDURE — 258N000003 HC RX IP 258 OP 636: Performed by: HOSPITALIST

## 2025-07-28 PROCEDURE — 250N000013 HC RX MED GY IP 250 OP 250 PS 637: Performed by: STUDENT IN AN ORGANIZED HEALTH CARE EDUCATION/TRAINING PROGRAM

## 2025-07-28 PROCEDURE — 120N000001 HC R&B MED SURG/OB

## 2025-07-28 PROCEDURE — 999N000157 HC STATISTIC RCP TIME EA 10 MIN

## 2025-07-28 PROCEDURE — 99232 SBSQ HOSP IP/OBS MODERATE 35: CPT | Performed by: INTERNAL MEDICINE

## 2025-07-28 PROCEDURE — 250N000011 HC RX IP 250 OP 636: Performed by: EMERGENCY MEDICINE

## 2025-07-28 PROCEDURE — 93970 EXTREMITY STUDY: CPT

## 2025-07-28 PROCEDURE — 36415 COLL VENOUS BLD VENIPUNCTURE: CPT | Performed by: EMERGENCY MEDICINE

## 2025-07-28 PROCEDURE — 82310 ASSAY OF CALCIUM: CPT | Performed by: EMERGENCY MEDICINE

## 2025-07-28 PROCEDURE — 250N000011 HC RX IP 250 OP 636: Performed by: ORTHOPAEDIC SURGERY

## 2025-07-28 PROCEDURE — 250N000013 HC RX MED GY IP 250 OP 250 PS 637: Performed by: INTERNAL MEDICINE

## 2025-07-28 PROCEDURE — 80048 BASIC METABOLIC PNL TOTAL CA: CPT | Performed by: HOSPITALIST

## 2025-07-28 RX ORDER — ONDANSETRON 4 MG/1
4 TABLET, ORALLY DISINTEGRATING ORAL EVERY 6 HOURS PRN
Status: ACTIVE | OUTPATIENT
Start: 2025-07-28

## 2025-07-28 RX ORDER — DAPTOMYCIN 50 MG/ML
6 INJECTION, POWDER, LYOPHILIZED, FOR SOLUTION INTRAVENOUS EVERY 24 HOURS
Qty: 999999 ML | Refills: 0 | Status: ON HOLD | OUTPATIENT
Start: 2025-07-28 | End: 2025-09-04

## 2025-07-28 RX ORDER — ACETAMINOPHEN 325 MG/1
650 TABLET ORAL EVERY 4 HOURS PRN
Status: ACTIVE | OUTPATIENT
Start: 2025-07-28

## 2025-07-28 RX ORDER — HYDROMORPHONE HYDROCHLORIDE 2 MG/1
2 TABLET ORAL EVERY 4 HOURS PRN
Status: DISCONTINUED | OUTPATIENT
Start: 2025-07-28 | End: 2025-07-29

## 2025-07-28 RX ORDER — OXYCODONE HYDROCHLORIDE 5 MG/1
5-10 TABLET ORAL EVERY 6 HOURS PRN
Status: ON HOLD | COMMUNITY

## 2025-07-28 RX ORDER — NALOXONE HYDROCHLORIDE 0.4 MG/ML
0.2 INJECTION, SOLUTION INTRAMUSCULAR; INTRAVENOUS; SUBCUTANEOUS
Status: ACTIVE | OUTPATIENT
Start: 2025-07-28

## 2025-07-28 RX ORDER — DAPTOMYCIN 50 MG/ML
6 INJECTION, POWDER, LYOPHILIZED, FOR SOLUTION INTRAVENOUS EVERY 24 HOURS
Status: DISCONTINUED | OUTPATIENT
Start: 2025-07-28 | End: 2025-07-28

## 2025-07-28 RX ORDER — PROCHLORPERAZINE MALEATE 5 MG/1
10 TABLET ORAL EVERY 6 HOURS PRN
Status: ACTIVE | OUTPATIENT
Start: 2025-07-28

## 2025-07-28 RX ORDER — CALCIUM CARBONATE 500 MG/1
1000 TABLET, CHEWABLE ORAL 4 TIMES DAILY PRN
Status: ACTIVE | OUTPATIENT
Start: 2025-07-28

## 2025-07-28 RX ORDER — PRAZOSIN HYDROCHLORIDE 1 MG/1
3 CAPSULE ORAL AT BEDTIME
Status: DISPENSED | OUTPATIENT
Start: 2025-07-28

## 2025-07-28 RX ORDER — NALOXONE HYDROCHLORIDE 0.4 MG/ML
0.4 INJECTION, SOLUTION INTRAMUSCULAR; INTRAVENOUS; SUBCUTANEOUS
Status: ACTIVE | OUTPATIENT
Start: 2025-07-28

## 2025-07-28 RX ORDER — ACETAMINOPHEN 325 MG/1
650 TABLET ORAL EVERY 6 HOURS
Status: DISCONTINUED | OUTPATIENT
Start: 2025-07-28 | End: 2025-07-29

## 2025-07-28 RX ORDER — FUROSEMIDE 10 MG/ML
40 INJECTION INTRAMUSCULAR; INTRAVENOUS
Status: DISCONTINUED | OUTPATIENT
Start: 2025-07-29 | End: 2025-07-31

## 2025-07-28 RX ORDER — MIRABEGRON 50 MG/1
50 TABLET, FILM COATED, EXTENDED RELEASE ORAL DAILY
Status: DISPENSED | OUTPATIENT
Start: 2025-07-29

## 2025-07-28 RX ORDER — IOPAMIDOL 755 MG/ML
500 INJECTION, SOLUTION INTRAVASCULAR ONCE
Status: COMPLETED | OUTPATIENT
Start: 2025-07-28 | End: 2025-07-28

## 2025-07-28 RX ORDER — AMOXICILLIN 250 MG
2 CAPSULE ORAL 2 TIMES DAILY PRN
Status: ACTIVE | OUTPATIENT
Start: 2025-07-28

## 2025-07-28 RX ORDER — RIFAMPIN 300 MG/1
300 CAPSULE ORAL 2 TIMES DAILY
Status: DISPENSED | OUTPATIENT
Start: 2025-07-28

## 2025-07-28 RX ORDER — SENNOSIDES 8.6 MG
325 CAPSULE ORAL 2 TIMES DAILY
Status: DISCONTINUED | OUTPATIENT
Start: 2025-07-28 | End: 2025-07-29

## 2025-07-28 RX ORDER — DULOXETIN HYDROCHLORIDE 60 MG/1
120 CAPSULE, DELAYED RELEASE ORAL DAILY
Status: DISPENSED | OUTPATIENT
Start: 2025-07-29

## 2025-07-28 RX ORDER — AMOXICILLIN 250 MG
1 CAPSULE ORAL 2 TIMES DAILY
Status: DISCONTINUED | OUTPATIENT
Start: 2025-07-28 | End: 2025-07-28

## 2025-07-28 RX ORDER — OXYBUTYNIN CHLORIDE 5 MG/1
30 TABLET, EXTENDED RELEASE ORAL DAILY
Status: DISPENSED | OUTPATIENT
Start: 2025-07-29

## 2025-07-28 RX ORDER — DOXYCYCLINE HYCLATE 50 MG/1
50 CAPSULE ORAL 2 TIMES DAILY
Status: ON HOLD | COMMUNITY

## 2025-07-28 RX ORDER — AMOXICILLIN 250 MG
2 CAPSULE ORAL 2 TIMES DAILY
Status: ACTIVE | OUTPATIENT
Start: 2025-07-28

## 2025-07-28 RX ORDER — ALLOPURINOL 300 MG/1
300 TABLET ORAL DAILY
Status: DISPENSED | OUTPATIENT
Start: 2025-07-29

## 2025-07-28 RX ORDER — ACETAMINOPHEN 650 MG/1
650 SUPPOSITORY RECTAL EVERY 4 HOURS PRN
Status: ACTIVE | OUTPATIENT
Start: 2025-07-28

## 2025-07-28 RX ORDER — AMOXICILLIN 250 MG
1 CAPSULE ORAL 2 TIMES DAILY
Status: DISPENSED | OUTPATIENT
Start: 2025-07-28

## 2025-07-28 RX ORDER — ONDANSETRON 2 MG/ML
4 INJECTION INTRAMUSCULAR; INTRAVENOUS EVERY 6 HOURS PRN
Status: ACTIVE | OUTPATIENT
Start: 2025-07-28

## 2025-07-28 RX ORDER — FERROUS SULFATE 325(65) MG
325 TABLET ORAL
Status: DISPENSED | OUTPATIENT
Start: 2025-07-29

## 2025-07-28 RX ORDER — PLANT STANOL ESTER 450 MG
2.5 TABLET ORAL DAILY
Status: ACTIVE | OUTPATIENT
Start: 2025-07-29

## 2025-07-28 RX ORDER — LIDOCAINE 40 MG/G
CREAM TOPICAL
Status: ACTIVE | OUTPATIENT
Start: 2025-07-28

## 2025-07-28 RX ORDER — AMOXICILLIN 250 MG
1 CAPSULE ORAL 2 TIMES DAILY PRN
Status: ACTIVE | OUTPATIENT
Start: 2025-07-28

## 2025-07-28 RX ADMIN — RIFAMPIN 300 MG: 300 CAPSULE ORAL at 08:33

## 2025-07-28 RX ADMIN — ENOXAPARIN SODIUM 40 MG: 40 INJECTION SUBCUTANEOUS at 08:34

## 2025-07-28 RX ADMIN — ACETAMINOPHEN 650 MG: 325 TABLET ORAL at 23:35

## 2025-07-28 RX ADMIN — OXYBUTYNIN CHLORIDE 30 MG: 5 TABLET, EXTENDED RELEASE ORAL at 08:34

## 2025-07-28 RX ADMIN — SENNOSIDES AND DOCUSATE SODIUM 1 TABLET: 50; 8.6 TABLET ORAL at 08:33

## 2025-07-28 RX ADMIN — MIRABEGRON 50 MG: 50 TABLET, EXTENDED RELEASE ORAL at 08:41

## 2025-07-28 RX ADMIN — DULOXETINE 120 MG: 60 CAPSULE, DELAYED RELEASE ORAL at 08:33

## 2025-07-28 RX ADMIN — Medication 5 MG: at 23:37

## 2025-07-28 RX ADMIN — SODIUM CHLORIDE, SODIUM LACTATE, POTASSIUM CHLORIDE, AND CALCIUM CHLORIDE: .6; .31; .03; .02 INJECTION, SOLUTION INTRAVENOUS at 05:16

## 2025-07-28 RX ADMIN — ALLOPURINOL 300 MG: 300 TABLET ORAL at 08:34

## 2025-07-28 RX ADMIN — FAMOTIDINE 20 MG: 20 TABLET, FILM COATED ORAL at 08:34

## 2025-07-28 RX ADMIN — ASPIRIN 325 MG: 325 TABLET, COATED ORAL at 23:36

## 2025-07-28 RX ADMIN — FERROUS SULFATE TAB 325 MG (65 MG ELEMENTAL FE) 325 MG: 325 (65 FE) TAB at 08:33

## 2025-07-28 RX ADMIN — SENNOSIDES AND DOCUSATE SODIUM 1 TABLET: 50; 8.6 TABLET ORAL at 23:36

## 2025-07-28 RX ADMIN — POLYETHYLENE GLYCOL 3350 17 G: 17 POWDER, FOR SOLUTION ORAL at 08:33

## 2025-07-28 RX ADMIN — CHOLECALCIFEROL TAB 10 MCG (400 UNIT) 10 MCG: 10 TAB at 08:41

## 2025-07-28 RX ADMIN — Medication 200 MG: at 08:33

## 2025-07-28 RX ADMIN — PANTOPRAZOLE SODIUM 40 MG: 40 TABLET, DELAYED RELEASE ORAL at 08:34

## 2025-07-28 RX ADMIN — SODIUM CHLORIDE 65 ML: 9 INJECTION, SOLUTION INTRAVENOUS at 18:31

## 2025-07-28 RX ADMIN — HYDROXYZINE HYDROCHLORIDE 25 MG: 25 TABLET, FILM COATED ORAL at 11:42

## 2025-07-28 RX ADMIN — OLANZAPINE 15 MG: 10 TABLET, FILM COATED ORAL at 23:36

## 2025-07-28 RX ADMIN — OXYCODONE HYDROCHLORIDE 10 MG: 10 TABLET ORAL at 08:41

## 2025-07-28 RX ADMIN — IOPAMIDOL 100 ML: 755 INJECTION, SOLUTION INTRAVENOUS at 18:30

## 2025-07-28 ASSESSMENT — ACTIVITIES OF DAILY LIVING (ADL)
ADLS_ACUITY_SCORE: 41
ADLS_ACUITY_SCORE: 58
ADLS_ACUITY_SCORE: 41
ADLS_ACUITY_SCORE: 58
ADLS_ACUITY_SCORE: 41
ADLS_ACUITY_SCORE: 58
ADLS_ACUITY_SCORE: 41
ADLS_ACUITY_SCORE: 58
ADLS_ACUITY_SCORE: 41
ADLS_ACUITY_SCORE: 60
ADLS_ACUITY_SCORE: 41
ADLS_ACUITY_SCORE: 41
ADLS_ACUITY_SCORE: 58
ADLS_ACUITY_SCORE: 41
ADLS_ACUITY_SCORE: 58
ADLS_ACUITY_SCORE: 41

## 2025-07-28 ASSESSMENT — COLUMBIA-SUICIDE SEVERITY RATING SCALE - C-SSRS
2. HAVE YOU ACTUALLY HAD ANY THOUGHTS OF KILLING YOURSELF IN THE PAST MONTH?: NO
1. IN THE PAST MONTH, HAVE YOU WISHED YOU WERE DEAD OR WISHED YOU COULD GO TO SLEEP AND NOT WAKE UP?: NO
6. HAVE YOU EVER DONE ANYTHING, STARTED TO DO ANYTHING, OR PREPARED TO DO ANYTHING TO END YOUR LIFE?: NO

## 2025-07-28 NOTE — PROGRESS NOTES
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

## 2025-07-28 NOTE — ED PROVIDER NOTES
Emergency Department Note      Consent was obtained from the patient to use an AI documentation tool in the creation of this note.    Code Status: Prior    History of Present Illness     Chief Complaint:  Post-op Problem       HPI   Kimo Greenwood is a 44 year old male with presenting with post- op problem. In April 2025, underwent a hip replacement. More recently, returned to the hospital due to concern for infection at the surgical site, described as significant swelling with one hip appearing higher than the other. Underwent surgical removal of the infection on Monday, July 21, 2025. Since that procedure, has had persistent drainage from the surgical site, with ongoing attempts to manage the drainage. Was discharged home with canisters to collect drainage, but experienced rapid filling of all provided canisters and continued significant bleeding from the bandage, resulting in blood leakage at home. Reports that the only activities after discharge were riding home and walking into the apartment. States feeling fine currently, with no lightheadedness or pain at the surgical site. Denies any new symptoms since discharge. Reports receiving blood thinner injections in the abdomen during the recent hospitalization. States that since going home, the bandage has required more frequent changes due to increased drainage. No mention of fever or other systemic symptoms. Has a PICC line in place for planned antibiotic administration at home.    Independent Historian:    Care staff as detailed above.    Review of External Notes  I reviewed discharge summary from (7/28/25): Patient with total hip arthroplasty in April 2025.  Patient presented to [facility] 7/19/20[facility] with fall and symptoms of infection of the right hip.  Patient had an I&D of the right hip on 7/21/2025 and revision of the head and acetabular liner by Dr. LOUIS.  An aspirate from the right hip grew Staph aureus.  Patient had a PICC line placed and the plan  will be for 6 weeks of IV antibiotics followed by 3 more months of oral antibiotic therapy per ID.    Past Medical History   Medical History, Surgical History, Problem List, and Medications  Reviewed in Epic    Physical Exam   Patient Vitals for the past 24 hrs:   BP Temp Temp src Pulse Resp SpO2 Height Weight   07/28/25 1930 (!) 180/98 -- -- 77 17 98 % -- --   07/28/25 1920 (!) 167/74 -- -- 101 20 99 % -- --   07/28/25 1900 (!) 167/76 -- -- 81 (!) 8 97 % -- --   07/28/25 1845 (!) 164/87 -- -- 93 24 97 % -- --   07/28/25 1815 -- -- -- -- (!) 8 -- -- --   07/28/25 1800 (!) 155/83 -- -- 87 20 98 % -- --   07/28/25 1745 (!) 152/81 -- -- 86 -- 99 % -- --   07/28/25 1730 (!) 148/79 -- -- 88 26 97 % -- --   07/28/25 1715 (!) 157/84 -- -- 98 20 99 % -- --   07/28/25 1707 (!) 161/93 -- -- 98 17 98 % -- --   07/28/25 1657 (!) 175/86 98.3  F (36.8  C) Oral 108 18 98 % 1.829 m (6') (!) 149.7 kg (330 lb)       Physical Exam  Constitutional: Vital signs reviewed as above.   Eyes: PEERL, EOMI B/L  Neck: No JVD noted. FROM   Cardiovascular: normal rate on my exam, Regular rhythm and normal heart sounds.  No murmur heard. Equal B/L peripheral pulses.  Pulmonary/Chest: Effort normal and breath sounds normal. No respiratory distress. Patient has no wheezes. Patient has no rales.   Gastrointestinal: Soft. There is no tenderness.   Musculoskeletal/Extremities: No deformities noted.  Skin: Wound VAC in place over right hip.  This is draining sanguinous fluid.  Psychiatric: The patient appears calm.              Diagnostics       Laboratory: Imaging:   Labs Ordered and Resulted from Time of ED Arrival to Time of ED Departure   BASIC METABOLIC PANEL (LIMITED OCCURRENCES) - Abnormal       Result Value    Sodium 140      Potassium 3.7      Chloride 105      Carbon Dioxide (CO2) 26      Anion Gap 9      Urea Nitrogen 11.3      Creatinine 1.40 (*)     GFR Estimate 64      Calcium 9.4      Glucose 122 (*)    CBC WITH PLATELETS AND DIFFERENTIAL  - Abnormal    WBC Count 10.9      RBC Count 2.75 (*)     Hemoglobin 7.7 (*)     Hematocrit 23.5 (*)     MCV 86      MCH 28.0      MCHC 32.8      RDW 14.2      Platelet Count 396      % Neutrophils 70      % Lymphocytes 19      % Monocytes 7      % Eosinophils 3      % Basophils 0      % Immature Granulocytes 1      NRBCs per 100 WBC 0      Absolute Neutrophils 7.7      Absolute Lymphocytes 2.1      Absolute Monocytes 0.8      Absolute Eosinophils 0.3      Absolute Basophils 0.0      Absolute Immature Granulocytes 0.1      Absolute NRBCs 0.0       CT Hip Right w Contrast   Final Result   IMPRESSION:   1.  Marked interval decrease in size of the fluid and gas collection along the right hip surgical tract which is likely secondary to interval intervention.   2.  Multiple enlarged right inguinal and right pelvic lymph nodes which are likely reactive.                  EKG   None    Independent Interpretation  See ED course    ED Course    Medications Administered  Medications   sodium chloride 0.9 % bag for CT scan flush (65 mLs Intravenous $Given 7/28/25 1831)   iopamidol (ISOVUE-370) solution 500 mL (100 mLs Intravenous $Given 7/28/25 1830)       Procedures  Procedures     Discussion of Management  See ED Course    ED Course  ED Course as of 07/28/25 1954 Mon Jul 28, 2025   1708 Initial evaluation.   1852 Patient's guardian expressed concerns that the patient would not be able to care for his wound VAC nor get the home PICC line IV antibiotic medications he needs.  She states that there was initial discussion at hospitalization about TCU but the patient's mother (who is also one of the patient's guardians) felt that things could be managed at the patient's residence.   1937 D/W Dr. Thao. Ok for admission.   1954 D/W Dr. Martin ([facility])       Optional/Additional Documentation: None    Medical Decision Making / Diagnosis     MIPS     None    Medical Decision Making:  This 44-year-old presents with his guardian due to  concern for postoperative wound bleeding.  Please see the HPI and exam for specifics.  The patient's hip was CT'd again and fortunately there does not appear to be any evidence of active extravasation of contrast into the wound.  Patient's hemoglobin is actually slightly better today than it was previously.  Given, however, the extent of the fluid that was suctioned out of the wound and the fact that there is severe concern for the patient's ability to manage his condition at home, I think that bringing the patient back into the hospital for discussion of TCU placement for wound management as well as antibiotic administration through his PICC line is reasonable.  Patient discussed with the hospitalist who accepted patient for admission.    Critical Care:  None.    Disposition:  See ED Course and MDM    ICD-10 Codes:    ICD-10-CM    1. Postoperative hemorrhage of musculoskeletal structure following musculoskeletal procedure  M96.830       2. Encounter for post surgical wound check  Z48.89            Discharge Medications:  New Prescriptions    No medications on file        7/28/2025   Shahab Casey DO     [employer]       Scribe Disclosure:  I, Christine Devine, am serving as a scribe at 5:24 PM on 7/28/2025 to document services personally performed by Shahab Casey DO based on my observations and the provider's statements to me.              hSahab Casey DO  07/28/25 1942       Shahab Casey DO  07/28/25 1954

## 2025-07-28 NOTE — TELEPHONE ENCOUNTER
Triage Note/Care Coordination    Identify the person you spoke with and their relationship to the patient: Crys, caregiver    Reason for the call: Other: Pt in ED    Other    Assessment: Pt in ED dt post surgical complication    Interventions/Recommendation/Comments: Appt with I nurse cancelled.    Outcomes:     What is the plan for ongoing care of this patient: Other: SOC to be rescheduled upon hospital discharge if orders resumed.    Was there harm, averted harm, or unexpected death of the patient? No    Does the patient/caregiver verbalize agreement with the plan? YES        Follow-Up Communication    None

## 2025-07-28 NOTE — PROGRESS NOTES
[facility]    Infectious Disease Progress Note    Date of Service (when I saw the patient): 07/28/2025     Assessment & Plan   Kimo Greenwood is a 44 year old male who was admitted on 7/19/2025.     Impression:  43 yo male with history of a right total hip arthroplasty in April 2025.    Approx 10 days ago he fell and began developing right hip pain and swelling.    He was seen in urgent care and ER visits on multiple occasions and eventually was placed on oral antibiotics.    With continued pain and swelling presented to the emergency room 2 days ago and was admitted for possibly infected right hip wound.    S/p I and D and combined, with revision of head and acetabular liner   OR culture with staph aureus MRSA, 1 culture with staph epi but clearly MRSA the pathogen here  MRSA PCR Positive      Recommendations   Vanco level high, creat also went up, vanco frequency reduced to 1500 mg Q 24 from Q 12   Follow-up creatinine still at 1.5 slightly lower but still problematic for ongoing vague    Anticipate need for IV antibiotics for 6 weeks followed by oral antibiotics for 3 months based on culture   Tolerating oral        Looks like Vanco not viable ongoing creatinine elevation will go to daptomycin, orders all in okay disposition.  Tolerating rifampin which has been started given a prescription for rifampin for the next 6 weeks as well at discharge, see me or Dr. Drake in the clinic in 4 to 5 weeks  Dose of daptomycin by ideal body weight is high but that is acceptable here anyway is higher dose daptomycin reasonable with joint fact      Didier Andrade MD    Interval History doing well no major symptoms, no significant positive new microbiology, creatinine still elevated although slightly improved      Physical Exam   Temp: 98.3  F (36.8  C) Temp src: Temporal BP: (!) 156/79 Pulse: 82   Resp: 16 SpO2: 99 % O2 Device: None (Room air)    Vitals:    07/19/25 1822   Weight: (!) 159.2 kg (350 lb  15.6 oz)     Vital Signs with Ranges  Temp:  [95.4  F (35.2  C)-98.3  F (36.8  C)] 98.3  F (36.8  C)  Pulse:  [81-84] 82  Resp:  [16-18] 16  BP: (128-156)/(65-79) 156/79  FiO2 (%):  [21 %] 21 %  SpO2:  [97 %-99 %] 99 %      Medications   Current Facility-Administered Medications   Medication Dose Route Frequency Provider Last Rate Last Admin    lactated ringers infusion   Intravenous Continuous Eloise Taylor MD 75 mL/hr at 07/28/25 0516 New Bag at 07/28/25 0516     Current Facility-Administered Medications   Medication Dose Route Frequency Provider Last Rate Last Admin    allopurinol (ZYLOPRIM) tablet 300 mg  300 mg Oral Daily Bala Randall MD   300 mg at 07/28/25 0834    cholecalciferol (VITAMIN D3) tablet 10 mcg  10 mcg Oral Daily Bala Randall MD   10 mcg at 07/28/25 0841    DAPTOmycin (CUBICIN) 1,000 mg in sodium chloride 0.9 % 100 mL intermittent infusion  6 mg/kg Intravenous Q24H Didier Andrade MD        DULoxetine (CYMBALTA) DR capsule 120 mg  120 mg Oral Daily Bala Randall MD   120 mg at 07/28/25 0833    enoxaparin ANTICOAGULANT (LOVENOX) injection 40 mg  40 mg Subcutaneous Q12H Bala Randall MD   40 mg at 07/28/25 0834    famotidine (PEPCID) tablet 20 mg  20 mg Oral BID Bala Randall MD   20 mg at 07/28/25 0834    Or    famotidine (PEPCID) injection 20 mg  20 mg Intravenous BID Bala Randall MD        ferrous sulfate (FEROSUL) tablet 325 mg  325 mg Oral Daily with breakfast Bala Randall MD   325 mg at 07/28/25 0833    hydrocortisone 2.5 % cream   Topical BID Bala Randall MD   Given at 07/22/25 0815    magnesium oxide (MAG-OX) half-tab 200 mg  200 mg Oral Daily Bala Randall MD   200 mg at 07/28/25 0833    mirabegron (MYRBETRIQ) 24 hr tablet 50 mg  50 mg Oral Daily Bala Randall MD   50 mg at 07/28/25 0841    OLANZapine (zyPREXA) tablet 15 mg  15 mg Oral At Bedtime Prakash  Bala Cummings MD   15 mg at 07/27/25 2203    oxyBUTYnin ER (DITROPAN XL) 24 hr tablet 30 mg  30 mg Oral Daily Bala Randall MD   30 mg at 07/28/25 0834    pantoprazole (PROTONIX) EC tablet 40 mg  40 mg Oral QAM AC Bala Randall MD   40 mg at 07/28/25 0834    polyethylene glycol (MIRALAX) Packet 17 g  17 g Oral Daily Bala Randall MD   17 g at 07/28/25 0833    prazosin (MINIPRESS) capsule 3 mg  3 mg Oral At Bedtime Bala Randall MD   3 mg at 07/27/25 2202    rifampin (RIFADIN) capsule 300 mg  300 mg Oral Q12H [facility] (08/20) Michael Ventura MD   300 mg at 07/28/25 0833    senna-docusate (SENOKOT-S/PERICOLACE) 8.6-50 MG per tablet 1 tablet  1 tablet Oral BID Bala Randall MD   1 tablet at 07/28/25 0833    sodium chloride (PF) 0.9% PF flush 3 mL  3 mL Intracatheter Q8H Bala Randall MD   3 mL at 07/28/25 0517       Data   All microbiology laboratory data reviewed.  Recent Labs   Lab Test 07/27/25  0535 07/24/25  0504 07/23/25  0552 07/22/25  0735 07/21/25  1206 07/20/25  0721 07/19/25  1431   WBC  --   --  9.0 15.7*  --  15.1* 17.7*   HGB  --   --  7.6* 8.9*  8.9* 9.7* 9.7* 10.2*   HCT  --   --   --  25.7*  --  29.4* 31.0*   MCV 85 86 85  85 84  84 85 84 85    319  --  326  --  318 344     Recent Labs   Lab Test 07/28/25  0517 07/27/25  0535 07/26/25  0547   CR 1.44* 1.51* 1.50*     Recent Labs   Lab Test 07/19/25  1431   SED 76*     Recent Labs   Lab Test 06/24/19  0844 06/24/19  0843   CULT No anaerobes isolated  No anaerobes isolated  No growth  No growth No anaerobes isolated  No growth       All cultures:  Recent Labs   Lab 07/22/25  1901   CULTURE Staphylococcus aureus MRSA not isolated upon subculture      Blood culture:  Results for orders placed or performed during the hospital encounter of 07/19/25   Blood Culture Peripheral blood (BC) Hand, Left    Collection Time: 07/19/25  4:15 PM    Specimen: Hand, Left; Peripheral blood  (BC)   Result Value Ref Range    Culture No Growth    Blood Culture Peripheral blood (BC) Arm, Right    Collection Time: 07/19/25  2:32 PM    Specimen: Arm, Right; Peripheral blood (BC)   Result Value Ref Range    Culture No Growth    Results for orders placed or performed during the hospital encounter of 05/23/25   Blood Culture Peripheral blood (BC) Arm, Right    Collection Time: 05/23/25  9:18 PM    Specimen: Arm, Right; Peripheral blood (BC)   Result Value Ref Range    Culture No Growth      *Note: Due to a large number of results and/or encounters for the requested time period, some results have not been displayed. A complete set of results can be found in Results Review.      Urine culture:  No results found. However, due to the size of the patient record, not all encounters were searched. Please check Results Review for a complete set of results.

## 2025-07-28 NOTE — PROGRESS NOTES
Care Management Discharge Note    Discharge Date: 07/28/2025       Discharge Disposition: home with home infusion     Discharge Services:      Discharge DME:      Discharge Transportation: family or friend will provide    Private pay costs discussed: Not applicable    Does the patient's insurance plan have a 3 day qualifying hospital stay waiver?  No    PAS Confirmation Code:    Patient/family educated on Medicare website which has current facility and service quality ratings:      Education Provided on the Discharge Plan:    Persons Notified of Discharge Plans: RN, [facility], mother  Patient/Family in Agreement with the Plan:  yes    Handoff Referral Completed: Yes, [facility] PCP: Internal handoff referral completed    Additional Information:  Patient discharging to home with [facility] for IV antibiotics. [facility] informed f orders are in earlier today. [facility] will arrange a teaching time with patient and Crys K/ PCA. Patient will need to get his 1600 dose of IV antibiotics here and then discharge. RN notified. CM called motherDalia to inform and she reports that Crys K will transport patient home.    Amarilys Del Toro, RN, BSN, CM  Inpatient Care Coordination  [facility]  [phone]

## 2025-07-28 NOTE — DISCHARGE SUMMARY
[facility]    Discharge Summary  Hospitalist    Date of Admission:  7/19/2025  Date of Discharge:  7/28/2025  Discharging Provider: Raleigh Schwab MD, MD  Date of Service (when I saw the patient): 07/28/25    Discharge Diagnoses     Surgical site infection  S/p right total hip arthroplasty in April 2025    Acute kidney injury    History of mild cognitive impairment     History of bronchial asthma     History of Patel's esophagus  History of GERD     History of benign essential hypertension    History of CKD    Gout, hyperuricacidemia     History of depression     History of morbidly obese  History of LOREN    History of Present Illness   Kimo Greenwood is an 44 year old male who presented with surgical site infection. Please see hospital course for details.     Hospital Course   44 year old gentleman with asthma, Patel's esophagus, GERD, hypertension, depression, mild cognitive impairment, chronic kidney disease, gout, morbid obesity, obstructive sleep apnea, overactive bladder, iron deficiency anemia and right total hip arthroplasty done in April 2025 who came into [facility] 7/19/2025 with fall and symptoms of infection of the right hip associated with progressively worsening edema redness and tenderness of the right hip to a point that he was unable to bear weight on it.  He was seen at [facility] urgent care on 7/11/2025 and started on doxycycline and then later again put on Augmentin 7/16/2025.  I did mention CRP was 130 WBC 17.7 hemoglobin 10.2 lactic acid was normal at 0.8 on 7/21/2025 he had incision and drainage of the right hip with revision of the head and acetabular liner by Dr. Bala Randall MD aspirate from the right hip grew Staph aureus he is on ceftriaxone 2 g daily which has since been discontinued and the patient is on vancomycin since 7/21/2025. PICC line is placed for IV antibiotic treatment which is being planned for 6 weeks followed by 3 more  months of oral antibiotic therapy as advised by ID.  Creatinine is monitored with vancomycin dosing and it is decided by ID that should the creatinine continue to be elevated by tomorrow 7/27/2025 patient will be started on daptomycin and discharged       Date of Admission:  7/19/2025  Assessment & Plan  Surgical site infection  S/p right total hip arthroplasty in April 2025  -S/p incision and drainage of the right hip by MD Johann 7/21/2025  -On treatment with ceftriaxone 2 g daily  -Aspirate from the right hip is growing MRSA  -Initially treated with vancomycin, changed to Daptomycin given renal dysfunction  -Infectious diseases would like to continue 6 weeks of IV antibiotic therapy followed by 3 months of oral antibiotic therapy  CRP is decreasing  PICC line was placed 7/23/2025  Creatinine is mildly elevated today ID is okay for discharge and have changed antibiotics to Daptomycin  - Creatinine down to 1.41. Patient wants to go home.  Patient advised to have repeat BMP in 2 to 3 days as outpatient.  - Updated patient's mother over the phone    CORINE  -suspect related to vancomycin, appears to have Plateau, will monitor for one more day and discharge if remains stable  -currently on LR at 100 ml/hour, noticeable lower extremity edema, will change to 75 ml/hour  - Renal ultrasound unremarkable  - Creatinine down to 1.41.  Patient advised to have outpatient follow-up with repeat BMP in 2 to 3 days.    History of mild cognitive impairment  -Patient mentioned that he lives independently at his own apartment with a close follow-up and visit from   -Social service following with us.  -Patient has a listed guardian     History of bronchial asthma  -Not in exacerbation     History of Patel's esophagus  History of GERD  -Continued on home regimen of PPI     History of benign essential hypertension   -Home regimen of prazosin was resumed     History of CKD  -Appears to be at baseline and continue to  monitor    Gout, hyperuricacidemia   Recently treated for gout with colchicine, steroid burst.  Should be on allopurinol so will resume.  Patient had not been taking at home     History of depression  - On Cymbalta and Zyprexa     History of morbidly obese  History of LOREN    Significant Results and Procedures   Results for orders placed or performed during the hospital encounter of 07/19/25   XR Ankle Bilateral G/E 3 Views    Narrative    EXAM: XR ANKLE BILATERAL G/E 3 VIEWS, XR FOOT LEFT G/E 3 VIEWS  LOCATION: [facility]  DATE: 07/19/2025    INDICATION: Fall, ankle pain.  COMPARISON: None.      Impression    IMPRESSION:  Left foot and ankle: Hallux valgus with mild 1st MTP joint arthrosis. Small well-corticated bone fragment along the lateral aspect of the 1st MTP joint space is chronic in appearance. There is no evidence of an acute displaced left foot or ankle   fracture. Hammertoe deformities. Ankle mortise is intact.    Right ankle: Right ankle shows circumferential soft tissue swelling. Ankle mortise is intact. There is no evidence of an acute displaced fracture. Small calcaneal heel spur.   Foot XR, G/E 3 views, left    Narrative    EXAM: XR ANKLE BILATERAL G/E 3 VIEWS, XR FOOT LEFT G/E 3 VIEWS  LOCATION: [facility]  DATE: 07/19/2025    INDICATION: Fall, ankle pain.  COMPARISON: None.      Impression    IMPRESSION:  Left foot and ankle: Hallux valgus with mild 1st MTP joint arthrosis. Small well-corticated bone fragment along the lateral aspect of the 1st MTP joint space is chronic in appearance. There is no evidence of an acute displaced left foot or ankle   fracture. Hammertoe deformities. Ankle mortise is intact.    Right ankle: Right ankle shows circumferential soft tissue swelling. Ankle mortise is intact. There is no evidence of an acute displaced fracture. Small calcaneal heel spur.   XR Pelvis and Hip Right 2 Views    Narrative    EXAM: XR PELVIS AND HIP  RIGHT 2 VIEWS  LOCATION: [facility]  DATE: 7/19/2025    INDICATION: unable to weightbear, s p MARGO  COMPARISON: CT 07/19/2025       Impression    IMPRESSION: Right total hip arthroplasty. No periprosthetic fracture or concerning lucency. Small amount of cystic change in the acetabulum present on preoperative CT. Left total hip arthroplasty is noted. Noninstrumented at least partial ankylosis of   the sacroiliac joints. Right sacral nerve stimulator. Contrast opacifies the collecting system. Mottled gas present lateral to the hip corresponding to fluid collection identified on CT.    CT Hip Right w Contrast    Narrative    EXAM: CT HIP RIGHT W CONTRAST  LOCATION: [facility]  DATE: 7/19/2025    INDICATION: right hip replacement, multiple falls pain, abscess? seroma?  COMPARISON: Radiographs 07/19/2025. CT 07/05/2025.   TECHNIQUE: Noncontrast. Axial, sagittal and coronal thin-section reconstruction. Dose reduction techniques were used.     FINDINGS:     Right total hip arthroplasty. There is associated metallic streak artifact. No CT evidence of periprosthetic fracture. Unchanged cystic appearing lucency in the right acetabulum, present on preoperative CT 03/23/2023. Partial noninstrumented ankylosis of   the right sacroiliac joint. Degenerative change of the pubic symphysis. Demineralization.    Right sacral nerve stimulator.    Interval increase in size of now large fluid collection within the soft tissues of the right hip now containing internal foci of gas. The collection measures at least 16.6 x 12.6 x 19.2 cm. The collection extends from the lateral skin surface through the   subcutaneous tissues and the lateral hip fascia likely contiguous with the joint space. There are small organized inferior extension within the vastus lateralis (series 4 image 142).  -Chronic strain of the proximal rectus femoris. Moderate atrophy of the gluteus minimus muscle.    Likely reactive  right inguinal/iliac lymph nodes. Partially imaged cystic finding in the mesentery which could represent prominent loops of bowel or an indolent cystic lesion such as a lymphangioma present dating back to CT 02/18/2022.      Impression    IMPRESSION:  1.  Right total hip arthroplasty. Interval increase in size of large right hip fluid and gas collection extending from the lateral skin surface through the subcutaneous tissues and lateral hip fascia likely contiguous with the joint space. Aspiration is   recommended.       XR Pelvis w Hip Port Right 1 View    Narrative    EXAM: XR PELVIS AND HIP PORTABLE RIGHT 1 VIEW  LOCATION: [facility]  DATE: 07/21/2025    INDICATION: Status post hip surgery.  COMPARISON: Radiographs from 07/19/2025.      Impression    IMPRESSION: Bilateral total hip arthroplasties. Excellent position/alignment. No hardware loosening or periprosthetic fracture evident. Nonspecific radiopaque material lateral to the greater trochanter, new and perhaps related to antibiotic impregnated   beads.     US Renal Complete Non-Vascular    Narrative    EXAM: US RENAL COMPLETE NON-VASCULAR  LOCATION: [facility]  DATE: 7/27/2025    INDICATION: valerie  COMPARISON: None.  TECHNIQUE: Routine Bilateral Renal and Bladder Ultrasound.    FINDINGS:    RIGHT KIDNEY: 12.1 x 6.6 x 6 cm. Normal without hydronephrosis or masses.     LEFT KIDNEY: 13.1 x 7.1 x 5.3 cm. Normal without hydronephrosis or masses.     BLADDER: Normal.      Impression    IMPRESSION:  1.  Normal kidney ultrasound.     *Note: Due to a large number of results and/or encounters for the requested time period, some results have not been displayed. A complete set of results can be found in Results Review.         Pending Results   None     Code Status   Full Code       Primary Care Physician   Louise Villalobos        Discharge Disposition   Discharged to home  Condition at discharge: Stable    Consultations This  Hospital Stay   PHARMACY TO DOSE VANCO  ORTHOPEDIC SURGERY IP CONSULT  CARE MANAGEMENT / SOCIAL WORK IP CONSULT  CARE MANAGEMENT / SOCIAL WORK IP CONSULT  INFECTIOUS DISEASES IP CONSULT  HOSPITALIST IP CONSULT  PHYSICAL THERAPY ADULT IP CONSULT  OCCUPATIONAL THERAPY ADULT IP CONSULT  INFECTIOUS DISEASES IP CONSULT  PHARMACY TO DOSE VANCO  VASCULAR ACCESS ADULT IP CONSULT  NURSING TO CONSULT FOR VIRTUAL CARE IP    Time Spent on this Encounter   Raleigh LÓPEZ MD, MD, personally saw the patient today and spent greater than 30 minutes discharging this patient.    Discharge Orders      Med Therapy Management Referral      Home Infusion Referral      Physical Therapy  Referral      Occupational Therapy  Referral      Primary Care - Care Coordination Referral      Reason for your hospital stay    Following I&D of right MARGO with head and liner exchange.     Activity    Your activity upon discharge: activity as tolerated but no driving.     Wound care and dressings    Instructions to care for your wound at home: Leave PAOLA dressing in place until post-op follow-up.  If battery pack begins beeping then please call our office at [phone].     Follow Up    Follow up with Judy Barrgaan PA-C within 12-16 days from surgery.  If you do not already have a follow up appointment scheduled, please call our [place] office: [phone].  No follow up labs or test are needed.  see me or Dr. Drake in the clinic in 4 to 5 weeks     Cr[facility]ches DME    DME Documentation: Describe the reason for need to support medical necessity: Impaired gait status post hip surgery. I, the undersigned, certify that the above prescribed supplies are medically necessary for this patient and is both reasonable and necessary in reference to accepted standards of medical practice in the treatment of this patient's condition and is not prescribed as a convenience.     [facility]    DME Documentation: Describe the reason for need  to support medical necessity: Impaired gait status post hip surgery. I, the undersigned, certify that the above prescribed supplies are medically necessary for this patient and is both reasonable and necessary in reference to accepted standards of medical practice in the treatment of this patient's condition and is not prescribed as a convenience.     [facility]    DME Documentation: Describe the reason for need to support medical necessity: Impaired gait status post hip surgery. I, the undersigned, certify that the above prescribed supplies are medically necessary for this patient and is both reasonable and necessary in reference to accepted standards of medical practice in the treatment of this patient's condition and is not prescribed as a convenience.     Diet    Follow this diet upon discharge: regular     Discharge Medications   Current Discharge Medication List        START taking these medications    Details   acetaminophen (TYLENOL) 325 MG tablet Take 2 tablets (650 mg) by mouth every 6 hours.  Qty: 100 tablet, Refills: 0    Associated Diagnoses: S/P total right hip arthroplasty      aspirin (ASA) 325 MG EC tablet Take one Aspirin tab twice daily for 5 weeks.  Qty: 70 tablet, Refills: 0    Associated Diagnoses: S/P total right hip arthroplasty      hydrOXYzine HCl (ATARAX) 25 MG tablet Take 1-2 tablets (25-50 mg) by mouth every 6 hours as needed for other (muscle spasms).  Qty: 60 tablet, Refills: 0    Associated Diagnoses: S/P total right hip arthroplasty      rifampin (RIFADIN) 300 MG capsule Take 1 capsule (300 mg) by mouth every 12 hours.  Qty: 84 capsule, Refills: 0    Associated Diagnoses: S/P total right hip arthroplasty; Infection of prosthetic joint, initial encounter      senna-docusate (SENOKOT-S/PERICOLACE) 8.6-50 MG tablet Take 1 tablet by mouth 2 times daily.  Qty: 30 tablet, Refills: 0    Associated Diagnoses: S/P total right hip arthroplasty      DAPTOmycin (CUBICIN) in NS syringe Inject 13  "mLs (650 mg) over 5-10 minutes into the vein via push every 24 hours.  Qty: 124691 mL, Refills: 0    Associated Diagnoses: Infection of prosthetic joint, initial encounter      Emergency Supply Kit, Central, Patient use for emergency only. Contents: 3 sodium chloride 0.9% flushes, 1 dressing kit, 1 microclave ext set 14\", 4 nitrile gloves (med), 6 alcohol prep pads, 1 bacitracin, 1 syringe (10 cc 20 G 1\"). Call [phone] to reorder.  Qty: [id number] kit, Refills: 0    Associated Diagnoses: Infection of prosthetic joint, initial encounter      sodium chloride, PF, 0.9% PF flush Inject 10 mLs into the vein as needed for line flush. Flush IV before and after medication administration as directed and/or at least every 24 hours.  Qty: 910823 mL, Refills: 0    Associated Diagnoses: Infection of prosthetic joint, initial encounter           CONTINUE these medications which have NOT CHANGED    Details   allopurinol (ZYLOPRIM) 300 MG tablet Take 1 tablet (300 mg) by mouth daily.  Qty: 90 tablet, Refills: 1    Associated Diagnoses: Acute gouty arthritis      calcium polycarbophil (FIBER-LAX) 625 MG tablet Take 1 tablet (625 mg) by mouth daily.  Qty: 90 tablet, Refills: 3    Associated Diagnoses: Slow transit constipation      cholecalciferol (VITAMIN D3) 10 mcg (400 units) TABS tablet Take 10 mcg by mouth daily.      DULoxetine (CYMBALTA) 60 MG capsule Take 120 mg by mouth daily       ferrous sulfate (FEROSUL) 325 (65 Fe) MG tablet TAKE 1 TABLET BY MOUTH DAILY WITH BREAKFAST  Qty: 28 tablet, Refills: 11    Comments: Maximum Refills Reached  Associated Diagnoses: Anemia, unspecified type      hydrocortisone 2.5 % cream Apply topically 2 times daily. Apply a thin layer to face once daily for up to 2 weeks, decrease use as rash improves, repeat as needed for flares      ketoconazole (NIZORAL) 2 % external cream Apply topically 2 times daily. Apply to rash on face twice daily until improved for seborrheic dermatitis    "   ketoconazole (NIZORAL) 2 % external shampoo Apply topically daily as needed for itching or irritation.      loperamide (IMODIUM) 2 MG capsule Take 2 mg by mouth 4 times daily as needed for diarrhea.      magnesium 250 MG tablet Take 1 tablet by mouth daily.      mirabegron (MYRBETRIQ) 50 MG 24 hr tablet Take 1 tablet by mouth daily      OLANZapine (ZYPREXA) 15 MG tablet Take 15 mg by mouth at bedtime.      oxyBUTYnin ER (DITROPAN XL) 15 MG 24 hr tablet Take 2 tablets (30 mg) by mouth daily  Qty: 60 tablet, Refills: 1    Associated Diagnoses: Overactive bladder      potassium gluconate 2.5 MEQ tablet Take 2.5 mEq by mouth daily.      prazosin (MINIPRESS) 1 MG capsule Take 3 mg by mouth At Bedtime      tirzepatide-Weight Management (ZEPBOUND) 12.5 MG/0.5ML prefilled pen Inject 0.5 mLs (12.5 mg) subcutaneously every 7 days.  Qty: 6 mL, Refills: 1    Associated Diagnoses: Class 3 severe obesity due to excess calories with serious comorbidity and body mass index (BMI) of 50.0 to 59.9 in adult (H)           STOP taking these medications       oxyCODONE (ROXICODONE) 5 MG tablet Comments:   Reason for Stopping:         amoxicillin-clavulanate (AUGMENTIN) 875-125 MG tablet Comments:   Reason for Stopping:         hydrOXYzine leandro (VISTARIL) 25 MG capsule Comments:   Reason for Stopping:               Allergies   Allergies   Allergen Reactions    Other [No Clinical Screening - See Comments] Other (See Comments)     Awoke from anesthesia with anger and ripping off dressing, after interstim placement, outside hospital 2013     Data

## 2025-07-28 NOTE — PROGRESS NOTES
Patients last colonoscopy on file was on 10/28/2020 and was recommended to repeat in 5 years.  CRC Screening Colonoscopy Referral Review    Patient meets the inclusion criteria for screening colonoscopy standing order.    Ordering/Referring Provider:  Louise Villalobos      BMI: Estimated body mass index is 47.6 kg/m  as calculated from the following:    Height as of 7/19/25: 1.829 m (6').    Weight as of 7/19/25: 159.2 kg (350 lb 15.6 oz).     Sedation:  Does patient have any of the following conditions affecting sedation?  Hx of chemical dependency: MAC sedation recommended    Previous Scopes:  Any previous recommendations or follow up needs based on previous scope?  na / No recommendations.    Medical Concerns to Postpone Order:  Does patient have any of the following medical concerns that should postpone/delay colonoscopy referral?  No medical conditions affecting colonoscopy referral.    Final Referral Details:  Based on patient's medical history patient is appropriate for referral order with MAC/deep sedation.   BMI<= 45 45 < BMI <= 48 48 < BMI < = 50  BMI > 50   No Restrictions No [facility] ASC  No [facility] ASC with exceptions Hospital Only OR Only

## 2025-07-28 NOTE — PROGRESS NOTES
"[facility]    Pt will discharge home on IV daptomycin q24. I spoke with pt's mom, Belen to coordinate IV teaching and she reported that she would not be available today and that I should teach the pt and his PCA. She said the pt would be doing his own infusions and his PCA would \"observe\" him doing his own infusions on a daily basis. I do not feel confident the pt would be able to learn and manage his own infusions successfully even with oversight. I had been informed last week that the pt's mom would be managing his infusions. I spoke with my office to relay my concerns and they agree that the pt will require a caregiver for all infusions.     I spoke with Belen again and told her that we would need a willing and able caregiver to manage his infusions or we would not be reilly to admit him to home infusion services. Belen now agrees to manage his daily infusions. She is requesting a home RN visit this evening for IV teaching at 1900h, which [facility] will coordinate. Please do NOT give him his 1600h daptomycin dose today as we will give him his dose at 1900h.    Pt will need to be home by 1800h to receive delivery of medication and supplies.    Thank you for the referral.    Iris Lea RN  [facility] Liaison  [phone] (Mon thru Fri 8am - 5pm)  [phone] Office    "

## 2025-07-28 NOTE — PLAN OF CARE
Goal Outcome Evaluation:      Plan of Care Reviewed With: patient    Overall Patient Progress: improvingOverall Patient Progress: improving    Outcome Evaluation: VSS on RA. AOx4. PICC WDL. SBA to bathroom GB/W. No PRNs. given.      Problem: Adult Inpatient Plan of Care  Goal: Plan of Care Review  Description: The Plan of Care Review/Shift note should be completed every shift.  The Outcome Evaluation is a brief statement about your assessment that the patient is improving, declining, or no change.  This information will be displayed automatically on your shift  note.  Recent Flowsheet Documentation  Taken 7/28/2025 0307 by Wilfredo Cruz, RN  Outcome Evaluation: VSS on RA. AOx4. PICC WDL. SBA to bathroom GB/W. No PRNs. given.  Plan of Care Reviewed With: patient  Overall Patient Progress: improving  Goal: Absence of Hospital-Acquired Illness or Injury  Intervention: Identify and Manage Fall Risk  Recent Flowsheet Documentation  Taken 7/28/2025 0004 by Wilfredo Cruz RN  Safety Promotion/Fall Prevention:   activity supervised   lighting adjusted   nonskid shoes/slippers when out of bed   patient and family education   safety round/check completed   clutter free environment maintained   increased rounding and observation  Intervention: Prevent Skin Injury  Recent Flowsheet Documentation  Taken 7/28/2025 0004 by Wilfredo Cruz RN  Body Position: position changed independently  Skin Protection:   adhesive use limited   incontinence pads utilized  Taken 7/27/2025 1805 by Wilfredo Cruz RN  Body Position: position changed independently  Intervention: Prevent and Manage VTE (Venous Thromboembolism) Risk  Recent Flowsheet Documentation  Taken 7/28/2025 0004 by Wilfredo Cruz RN  VTE Prevention/Management: (ambulating in room, education given)   SCDs off (sequential compression devices)   patient refused intervention  Intervention: Prevent Infection  Recent Flowsheet Documentation  Taken 7/28/2025 0004 by  Wilfredo Cruz, RN  Infection Prevention:   hand hygiene promoted   rest/sleep promoted   single patient room provided  Goal: Optimal Comfort and Wellbeing  Intervention: Monitor Pain and Promote Comfort  Recent Flowsheet Documentation  Taken 7/28/2025 0004 by Wilfredo Cruz, RN  Pain Management Interventions: declines

## 2025-07-28 NOTE — DISCHARGE SUMMARY
Discharge Note    Patient discharged to home via private vehicle  accompanied by other care coordinator.  PICC: SL  Prescriptions filled and given to patient/family and e-prescribed to pharmacy.   Belongings reviewed and sent with patient.   Home medications returned to patient: NA  Equipment sent with: patient, N/A.   patient verbalizes understanding of discharge instructions. AVS given to patient. Per virtual nurse  Additional education completed? NA

## 2025-07-28 NOTE — ED TRIAGE NOTES
R hip surgery last Monday and discharged today. Bandage and wound vac have are soaked in bright red blood. Cannister has been emptied and refilled with blood. Pt denies blood thinners. Pt 8/10 pain in R hip. Pt told that they should come back in by surgeon. Family with pt.

## 2025-07-28 NOTE — PLAN OF CARE
Goal Outcome Evaluation:      Plan of Care Reviewed With: patient    Overall Patient Progress: improvingOverall Patient Progress: improving    Outcome Evaluation: VSS on room air. AOX4. IVF infusing. Up with SBA. Right hip dressing clean dry and intact with Prevena wound vac with Serosanguineous drainage. CMS intact. BLE 3+ to ankles/feet. Appetite adequate. Bladder scan completed with 237 ml. Ambulated in llamas x1. Home with home infusoin once medically ready.          Problem: Pain Acute  Goal: Optimal Pain Control and Function  Outcome: Progressing  Intervention: Develop Pain Management Plan  Recent Flowsheet Documentation  Taken 7/27/2025 1633 by Kym Nolasco RN  Pain Management Interventions: declines  Taken 7/27/2025 1548 by Kym Nolasco RN  Pain Management Interventions: medication (see MAR)  Intervention: Prevent or Manage Pain  Recent Flowsheet Documentation  Taken 7/27/2025 1548 by Kym Nolasco RN  Medication Review/Management: medications reviewed     Problem: Infection  Goal: Absence of Infection Signs and Symptoms  Outcome: Progressing  Intervention: Prevent or Manage Infection  Recent Flowsheet Documentation  Taken 7/27/2025 1548 by Kym Nolasco RN  Infection Management: aseptic technique maintained  Isolation Precautions: contact precautions maintained     Problem: Adult Inpatient Plan of Care  Goal: Plan of Care Review  Description: The Plan of Care Review/Shift note should be completed every shift.  The Outcome Evaluation is a brief statement about your assessment that the patient is improving, declining, or no change.  This information will be displayed automatically on your shift  note.  Outcome: Progressing  Flowsheets (Taken 7/27/2025 2333)  Outcome Evaluation: VSS on room air. AOX4. IVF infusing. Up with SBA. Right hip dressing clean dry and intact with Prevena wound vac with Serosanguineous drainage. CMS intact. BLE 3+ to ankles/feet. Appetite adequate. Bladder scan completed with 237 ml.  "Ambulated in llamas x1. Home with home infusoin once medically ready.  Plan of Care Reviewed With: patient  Overall Patient Progress: improving  Goal: Patient-Specific Goal (Individualized)  Description: You can add care plan individualizations to a care plan. Examples of Individualization might be:  \"Parent requests to be called daily at 9am for status\", \"I have a hard time hearing out of my right ear\", or \"Do not touch me to wake me up as it startles  me\".  Outcome: Progressing  Goal: Absence of Hospital-Acquired Illness or Injury  Outcome: Progressing  Intervention: Identify and Manage Fall Risk  Recent Flowsheet Documentation  Taken 7/27/2025 1548 by Kym Nolasco RN  Safety Promotion/Fall Prevention:   activity supervised   lighting adjusted   nonskid shoes/slippers when out of bed   patient and family education   safety round/check completed   clutter free environment maintained   increased rounding and observation  Intervention: Prevent Skin Injury  Recent Flowsheet Documentation  Taken 7/27/2025 1548 by Kym Nolasco RN  Body Position: position changed independently  Intervention: Prevent and Manage VTE (Venous Thromboembolism) Risk  Recent Flowsheet Documentation  Taken 7/27/2025 1548 by Kym Nolasco RN  VTE Prevention/Management: (ambulating in room, education given)   SCDs off (sequential compression devices)   patient refused intervention  Goal: Optimal Comfort and Wellbeing  Outcome: Progressing  Intervention: Monitor Pain and Promote Comfort  Recent Flowsheet Documentation  Taken 7/27/2025 1633 by Kym Nolasco RN  Pain Management Interventions: declines  Taken 7/27/2025 1548 by Kym Nolasco RN  Pain Management Interventions: medication (see MAR)  Goal: Readiness for Transition of Care  Outcome: Progressing     "

## 2025-07-28 NOTE — PROGRESS NOTES
Orthopedic Surgery  7/28/2025    S: Patient voices no complaints today. Provena cannister was full    O: Blood pressure (!) 156/79, pulse 82, temperature 98.3  F (36.8  C), temperature source Temporal, resp. rate 16, height 1.829 m (6'), weight (!) 159.2 kg (350 lb 15.6 oz), SpO2 99%.  Lab Results   Component Value Date    HGB 7.6 07/23/2025    HGB 13.4 10/26/2020     Lab Results   Component Value Date    INR 1.20 07/21/2025    INR 0.92 01/09/2017        Neurovascularly intact.  Calves are negative bilaterally, both soft and nontender.  The wound is dressed with provena dressing  The wound looks good with minimal erythema of the surrounding skin.    A: Mr. Greenwood is doing well status post Procedure(s):  Incision and drainage hip, combined, with revision of head and acetabular liner.    P: Continue physical therapy.   Antibiotics: changed from vanco to daptomycin, creatinine is improved on new abx  Anticoagulation home on ASA   Pain management  Discharge planning, home when medically stable    Bala Randall MD  [phone]

## 2025-07-28 NOTE — PLAN OF CARE
Goal Outcome Evaluation:      Plan of Care Reviewed With: patient    Overall Patient Progress: improvingOverall Patient Progress: improving    Outcome Evaluation: Patient alert and orinented. VSS on RA. Pain managed with oral medications. Voiding. Up SBA with walker gait belt. Canister for prevena changed by provider today. Plan to discharge home today with mom to get home infusion teaching.

## 2025-07-28 NOTE — PROVIDER NOTIFICATION
07/28/25 0210   Tech Time   $Tech Time (10 minute increments) 3   Mode: CPAP/ BiPAP/ AVAPS/ AVAPS AE   CPAP/BiPAP/ AVAPS/ AVAPS AE Mode CPAP   Equipment   Device Resmed   CPAP/BiPAP/Settings   $CPAP/BiPAP Subsequent completed   BIPAP/CPAP On Standby On   Oxygen (%) 21   CPAP/BiPAP Patient Parameters   CPAP (cm H2O)   (5-20)   RR Total (breaths/min) 16 breaths/min   CPAP/BiPAP/AVAPS/AVAPS AE Alarms   Humidifier Checked N/A   RT Device Skin Assessment   Oxygen Delivery Device CPAP/BiPAP Mask   Interface Face Mask - Medium   Ventilator Arm In Place No   Site Appearance neck circumference Clean and dry   Site Appearance bridge of nose Clean and dry   Site Appearance occiput Clean and dry   Strap Tightness Finger Allowance between head and device strap   Device Skin Interventions Taken No adjustments needed   Respiratory WDL   Respiratory WDL X   Rhythm/Pattern, Respiratory assisted mechanically   Expansion/Accessory Muscles/Retractions expansion symmetric;no retractions;no use of accessory muscles     Marlo Martinez, RT on 7/28/2025 at 2:11 AM

## 2025-07-29 ENCOUNTER — ANESTHESIA EVENT (OUTPATIENT)
Dept: SURGERY | Facility: CLINIC | Age: 45
End: 2025-07-29
Payer: COMMERCIAL

## 2025-07-29 ENCOUNTER — ANESTHESIA (OUTPATIENT)
Dept: SURGERY | Facility: CLINIC | Age: 45
End: 2025-07-29
Payer: COMMERCIAL

## 2025-07-29 LAB
ANION GAP SERPL CALCULATED.3IONS-SCNC: 7 MMOL/L (ref 7–15)
BUN SERPL-MCNC: 10.6 MG/DL (ref 6–20)
CALCIUM SERPL-MCNC: 9.1 MG/DL (ref 8.8–10.4)
CHLORIDE SERPL-SCNC: 108 MMOL/L (ref 98–107)
CK SERPL-CCNC: 7 U/L (ref 39–308)
CREAT SERPL-MCNC: 1.47 MG/DL (ref 0.67–1.17)
EGFRCR SERPLBLD CKD-EPI 2021: 60 ML/MIN/1.73M2
ERYTHROCYTE [DISTWIDTH] IN BLOOD BY AUTOMATED COUNT: 14.4 % (ref 10–15)
GLUCOSE BLDC GLUCOMTR-MCNC: 102 MG/DL (ref 70–99)
GLUCOSE BLDC GLUCOMTR-MCNC: 102 MG/DL (ref 70–99)
GLUCOSE BLDC GLUCOMTR-MCNC: 115 MG/DL (ref 70–99)
GLUCOSE BLDC GLUCOMTR-MCNC: 71 MG/DL (ref 70–99)
GLUCOSE SERPL-MCNC: 106 MG/DL (ref 70–99)
HCO3 SERPL-SCNC: 27 MMOL/L (ref 22–29)
HCT VFR BLD AUTO: 22.6 % (ref 40–53)
HGB BLD-MCNC: 7.6 G/DL (ref 13.3–17.7)
MAGNESIUM SERPL-MCNC: 1.6 MG/DL (ref 1.7–2.3)
MCH RBC QN AUTO: 28.6 PG (ref 26.5–33)
MCHC RBC AUTO-ENTMCNC: 33.6 G/DL (ref 31.5–36.5)
MCV RBC AUTO: 85 FL (ref 78–100)
PLATELET # BLD AUTO: 332 10E3/UL (ref 150–450)
POTASSIUM SERPL-SCNC: 4 MMOL/L (ref 3.4–5.3)
RBC # BLD AUTO: 2.66 10E6/UL (ref 4.4–5.9)
SODIUM SERPL-SCNC: 142 MMOL/L (ref 135–145)
WBC # BLD AUTO: 8 10E3/UL (ref 4–11)

## 2025-07-29 PROCEDURE — 82550 ASSAY OF CK (CPK): CPT | Performed by: STUDENT IN AN ORGANIZED HEALTH CARE EDUCATION/TRAINING PROGRAM

## 2025-07-29 PROCEDURE — 250N000011 HC RX IP 250 OP 636: Performed by: STUDENT IN AN ORGANIZED HEALTH CARE EDUCATION/TRAINING PROGRAM

## 2025-07-29 PROCEDURE — 710N000010 HC RECOVERY PHASE 1, LEVEL 2, PER MIN: Performed by: ORTHOPAEDIC SURGERY

## 2025-07-29 PROCEDURE — 250N000011 HC RX IP 250 OP 636: Performed by: NURSE ANESTHETIST, CERTIFIED REGISTERED

## 2025-07-29 PROCEDURE — 83735 ASSAY OF MAGNESIUM: CPT | Performed by: HOSPITALIST

## 2025-07-29 PROCEDURE — 250N000013 HC RX MED GY IP 250 OP 250 PS 637: Performed by: ORTHOPAEDIC SURGERY

## 2025-07-29 PROCEDURE — 272N000002 HC OR SUPPLY OTHER OPNP: Performed by: ORTHOPAEDIC SURGERY

## 2025-07-29 PROCEDURE — 250N000013 HC RX MED GY IP 250 OP 250 PS 637: Performed by: STUDENT IN AN ORGANIZED HEALTH CARE EDUCATION/TRAINING PROGRAM

## 2025-07-29 PROCEDURE — 120N000001 HC R&B MED SURG/OB

## 2025-07-29 PROCEDURE — 999N000157 HC STATISTIC RCP TIME EA 10 MIN

## 2025-07-29 PROCEDURE — 0JDL0ZZ EXTRACTION OF RIGHT UPPER LEG SUBCUTANEOUS TISSUE AND FASCIA, OPEN APPROACH: ICD-10-PCS | Performed by: ORTHOPAEDIC SURGERY

## 2025-07-29 PROCEDURE — 250N000009 HC RX 250: Performed by: NURSE ANESTHETIST, CERTIFIED REGISTERED

## 2025-07-29 PROCEDURE — 250N000011 HC RX IP 250 OP 636: Performed by: ORTHOPAEDIC SURGERY

## 2025-07-29 PROCEDURE — 999N000141 HC STATISTIC PRE-PROCEDURE NURSING ASSESSMENT: Performed by: ORTHOPAEDIC SURGERY

## 2025-07-29 PROCEDURE — 99232 SBSQ HOSP IP/OBS MODERATE 35: CPT | Performed by: HOSPITALIST

## 2025-07-29 PROCEDURE — 258N000001 HC RX 258: Performed by: ORTHOPAEDIC SURGERY

## 2025-07-29 PROCEDURE — 258N000003 HC RX IP 258 OP 636: Performed by: ANESTHESIOLOGY

## 2025-07-29 PROCEDURE — 85014 HEMATOCRIT: CPT | Performed by: STUDENT IN AN ORGANIZED HEALTH CARE EDUCATION/TRAINING PROGRAM

## 2025-07-29 PROCEDURE — 85041 AUTOMATED RBC COUNT: CPT | Performed by: STUDENT IN AN ORGANIZED HEALTH CARE EDUCATION/TRAINING PROGRAM

## 2025-07-29 PROCEDURE — 94660 CPAP INITIATION&MGMT: CPT

## 2025-07-29 PROCEDURE — 360N000076 HC SURGERY LEVEL 3, PER MIN: Performed by: ORTHOPAEDIC SURGERY

## 2025-07-29 PROCEDURE — 258N000003 HC RX IP 258 OP 636: Performed by: ORTHOPAEDIC SURGERY

## 2025-07-29 PROCEDURE — 3E10X8Z IRRIGATION OF SKIN AND MUCOUS MEMBRANES USING IRRIGATING SUBSTANCE: ICD-10-PCS | Performed by: ORTHOPAEDIC SURGERY

## 2025-07-29 PROCEDURE — 250N000009 HC RX 250: Performed by: ORTHOPAEDIC SURGERY

## 2025-07-29 PROCEDURE — 258N000003 HC RX IP 258 OP 636: Performed by: STUDENT IN AN ORGANIZED HEALTH CARE EDUCATION/TRAINING PROGRAM

## 2025-07-29 PROCEDURE — 250N000011 HC RX IP 250 OP 636: Performed by: ANESTHESIOLOGY

## 2025-07-29 PROCEDURE — 272N000001 HC OR GENERAL SUPPLY STERILE: Performed by: ORTHOPAEDIC SURGERY

## 2025-07-29 PROCEDURE — 250N000025 HC SEVOFLURANE, PER MIN: Performed by: ORTHOPAEDIC SURGERY

## 2025-07-29 PROCEDURE — 82310 ASSAY OF CALCIUM: CPT | Performed by: STUDENT IN AN ORGANIZED HEALTH CARE EDUCATION/TRAINING PROGRAM

## 2025-07-29 PROCEDURE — 999N000198 HC STATISTIC WOC PT EDUCATION, 16-30 MIN

## 2025-07-29 PROCEDURE — 258N000003 HC RX IP 258 OP 636: Performed by: NURSE ANESTHETIST, CERTIFIED REGISTERED

## 2025-07-29 PROCEDURE — 370N000017 HC ANESTHESIA TECHNICAL FEE, PER MIN: Performed by: ORTHOPAEDIC SURGERY

## 2025-07-29 PROCEDURE — 80048 BASIC METABOLIC PNL TOTAL CA: CPT | Performed by: STUDENT IN AN ORGANIZED HEALTH CARE EDUCATION/TRAINING PROGRAM

## 2025-07-29 RX ORDER — HYDROXYZINE HYDROCHLORIDE 25 MG/1
25 TABLET, FILM COATED ORAL EVERY 6 HOURS PRN
Status: DISPENSED | OUTPATIENT
Start: 2025-07-29

## 2025-07-29 RX ORDER — KETOROLAC TROMETHAMINE 15 MG/ML
15 INJECTION, SOLUTION INTRAMUSCULAR; INTRAVENOUS
Status: DISCONTINUED | OUTPATIENT
Start: 2025-07-29 | End: 2025-07-29 | Stop reason: HOSPADM

## 2025-07-29 RX ORDER — BISACODYL 10 MG
10 SUPPOSITORY, RECTAL RECTAL DAILY PRN
Status: ACTIVE | OUTPATIENT
Start: 2025-07-29

## 2025-07-29 RX ORDER — POLYETHYLENE GLYCOL 3350 17 G/17G
17 POWDER, FOR SOLUTION ORAL DAILY
Status: DISPENSED | OUTPATIENT
Start: 2025-07-30

## 2025-07-29 RX ORDER — DIPHENHYDRAMINE HCL 25 MG
25 CAPSULE ORAL EVERY 6 HOURS PRN
Status: ACTIVE | OUTPATIENT
Start: 2025-07-29

## 2025-07-29 RX ORDER — SODIUM CHLORIDE, SODIUM LACTATE, POTASSIUM CHLORIDE, CALCIUM CHLORIDE 600; 310; 30; 20 MG/100ML; MG/100ML; MG/100ML; MG/100ML
INJECTION, SOLUTION INTRAVENOUS CONTINUOUS
Status: ACTIVE | OUTPATIENT
Start: 2025-07-29

## 2025-07-29 RX ORDER — HYDROMORPHONE HCL IN WATER/PF 6 MG/30 ML
0.4 PATIENT CONTROLLED ANALGESIA SYRINGE INTRAVENOUS
Status: DISCONTINUED | OUTPATIENT
Start: 2025-07-29 | End: 2025-07-30

## 2025-07-29 RX ORDER — OXYCODONE HYDROCHLORIDE 10 MG/1
10 TABLET ORAL EVERY 4 HOURS PRN
Status: DISCONTINUED | OUTPATIENT
Start: 2025-07-29 | End: 2025-07-30

## 2025-07-29 RX ORDER — HYDROMORPHONE HCL IN WATER/PF 6 MG/30 ML
0.2 PATIENT CONTROLLED ANALGESIA SYRINGE INTRAVENOUS EVERY 5 MIN PRN
Refills: 0 | Status: DISCONTINUED | OUTPATIENT
Start: 2025-07-29 | End: 2025-07-29 | Stop reason: HOSPADM

## 2025-07-29 RX ORDER — SODIUM CHLORIDE, SODIUM LACTATE, POTASSIUM CHLORIDE, CALCIUM CHLORIDE 600; 310; 30; 20 MG/100ML; MG/100ML; MG/100ML; MG/100ML
INJECTION, SOLUTION INTRAVENOUS CONTINUOUS
Status: DISCONTINUED | OUTPATIENT
Start: 2025-07-29 | End: 2025-07-29 | Stop reason: HOSPADM

## 2025-07-29 RX ORDER — SENNOSIDES 8.6 MG
325 CAPSULE ORAL 2 TIMES DAILY
Status: DISPENSED | OUTPATIENT
Start: 2025-07-29

## 2025-07-29 RX ORDER — ONDANSETRON 4 MG/1
4 TABLET, ORALLY DISINTEGRATING ORAL EVERY 30 MIN PRN
Status: DISCONTINUED | OUTPATIENT
Start: 2025-07-29 | End: 2025-07-29 | Stop reason: HOSPADM

## 2025-07-29 RX ORDER — FENTANYL CITRATE 50 UG/ML
50 INJECTION, SOLUTION INTRAMUSCULAR; INTRAVENOUS EVERY 5 MIN PRN
Refills: 0 | Status: DISCONTINUED | OUTPATIENT
Start: 2025-07-29 | End: 2025-07-29 | Stop reason: HOSPADM

## 2025-07-29 RX ORDER — HYDROMORPHONE HCL IN WATER/PF 6 MG/30 ML
0.2 PATIENT CONTROLLED ANALGESIA SYRINGE INTRAVENOUS
Status: DISCONTINUED | OUTPATIENT
Start: 2025-07-29 | End: 2025-07-30

## 2025-07-29 RX ORDER — ONDANSETRON 2 MG/ML
4 INJECTION INTRAMUSCULAR; INTRAVENOUS EVERY 30 MIN PRN
Status: DISCONTINUED | OUTPATIENT
Start: 2025-07-29 | End: 2025-07-29 | Stop reason: HOSPADM

## 2025-07-29 RX ORDER — HYDROMORPHONE HCL IN WATER/PF 6 MG/30 ML
0.4 PATIENT CONTROLLED ANALGESIA SYRINGE INTRAVENOUS EVERY 5 MIN PRN
Refills: 0 | Status: DISCONTINUED | OUTPATIENT
Start: 2025-07-29 | End: 2025-07-29 | Stop reason: HOSPADM

## 2025-07-29 RX ORDER — TRANEXAMIC ACID 10 MG/ML
1 INJECTION, SOLUTION INTRAVENOUS ONCE
Status: DISCONTINUED | OUTPATIENT
Start: 2025-07-29 | End: 2025-07-30

## 2025-07-29 RX ORDER — VANCOMYCIN HYDROCHLORIDE 1 G/20ML
INJECTION, POWDER, LYOPHILIZED, FOR SOLUTION INTRAVENOUS PRN
Status: DISCONTINUED | OUTPATIENT
Start: 2025-07-29 | End: 2025-07-29 | Stop reason: HOSPADM

## 2025-07-29 RX ORDER — PROPOFOL 10 MG/ML
INJECTION, EMULSION INTRAVENOUS PRN
Status: DISCONTINUED | OUTPATIENT
Start: 2025-07-29 | End: 2025-07-29

## 2025-07-29 RX ORDER — METOPROLOL TARTRATE 1 MG/ML
1-2 INJECTION, SOLUTION INTRAVENOUS EVERY 5 MIN PRN
Status: DISCONTINUED | OUTPATIENT
Start: 2025-07-29 | End: 2025-07-29 | Stop reason: HOSPADM

## 2025-07-29 RX ORDER — OXYCODONE HYDROCHLORIDE 5 MG/1
5 TABLET ORAL EVERY 4 HOURS PRN
Status: DISCONTINUED | OUTPATIENT
Start: 2025-07-29 | End: 2025-07-30

## 2025-07-29 RX ORDER — FENTANYL CITRATE 50 UG/ML
INJECTION, SOLUTION INTRAMUSCULAR; INTRAVENOUS PRN
Status: DISCONTINUED | OUTPATIENT
Start: 2025-07-29 | End: 2025-07-29

## 2025-07-29 RX ORDER — LIDOCAINE HYDROCHLORIDE 20 MG/ML
INJECTION, SOLUTION INFILTRATION; PERINEURAL PRN
Status: DISCONTINUED | OUTPATIENT
Start: 2025-07-29 | End: 2025-07-29

## 2025-07-29 RX ORDER — AMOXICILLIN 250 MG
1 CAPSULE ORAL 2 TIMES DAILY
Status: DISCONTINUED | OUTPATIENT
Start: 2025-07-29 | End: 2025-07-29

## 2025-07-29 RX ORDER — NALOXONE HYDROCHLORIDE 0.4 MG/ML
0.1 INJECTION, SOLUTION INTRAMUSCULAR; INTRAVENOUS; SUBCUTANEOUS
Status: DISCONTINUED | OUTPATIENT
Start: 2025-07-29 | End: 2025-07-29 | Stop reason: HOSPADM

## 2025-07-29 RX ORDER — CEFAZOLIN SODIUM 2 G/50ML
2 SOLUTION INTRAVENOUS EVERY 8 HOURS
Status: COMPLETED | OUTPATIENT
Start: 2025-07-29 | End: 2025-07-30

## 2025-07-29 RX ORDER — DOXYCYCLINE HYCLATE 50 MG/1
50 CAPSULE ORAL 2 TIMES DAILY
Status: DISPENSED | OUTPATIENT
Start: 2025-07-29

## 2025-07-29 RX ORDER — DEXAMETHASONE SODIUM PHOSPHATE 4 MG/ML
4 INJECTION, SOLUTION INTRA-ARTICULAR; INTRALESIONAL; INTRAMUSCULAR; INTRAVENOUS; SOFT TISSUE
Status: DISCONTINUED | OUTPATIENT
Start: 2025-07-29 | End: 2025-07-29 | Stop reason: HOSPADM

## 2025-07-29 RX ORDER — CEFAZOLIN SODIUM/WATER 3 G/30 ML
3 SYRINGE (ML) INTRAVENOUS
Status: COMPLETED | OUTPATIENT
Start: 2025-07-29 | End: 2025-07-29

## 2025-07-29 RX ORDER — FAMOTIDINE 20 MG/1
20 TABLET, FILM COATED ORAL 2 TIMES DAILY
Status: DISPENSED | OUTPATIENT
Start: 2025-07-29

## 2025-07-29 RX ORDER — LIDOCAINE 40 MG/G
CREAM TOPICAL
Status: ACTIVE | OUTPATIENT
Start: 2025-07-29

## 2025-07-29 RX ORDER — HYDRALAZINE HYDROCHLORIDE 20 MG/ML
2.5-5 INJECTION INTRAMUSCULAR; INTRAVENOUS EVERY 10 MIN PRN
Status: DISCONTINUED | OUTPATIENT
Start: 2025-07-29 | End: 2025-07-29 | Stop reason: HOSPADM

## 2025-07-29 RX ORDER — FENTANYL CITRATE 50 UG/ML
25 INJECTION, SOLUTION INTRAMUSCULAR; INTRAVENOUS EVERY 5 MIN PRN
Refills: 0 | Status: DISCONTINUED | OUTPATIENT
Start: 2025-07-29 | End: 2025-07-29 | Stop reason: HOSPADM

## 2025-07-29 RX ORDER — ACETAMINOPHEN 325 MG/1
975 TABLET ORAL ONCE
Status: COMPLETED | OUTPATIENT
Start: 2025-07-29 | End: 2025-07-29

## 2025-07-29 RX ORDER — CEFAZOLIN SODIUM/WATER 3 G/30 ML
3 SYRINGE (ML) INTRAVENOUS SEE ADMIN INSTRUCTIONS
Status: DISCONTINUED | OUTPATIENT
Start: 2025-07-29 | End: 2025-07-29

## 2025-07-29 RX ORDER — ACETAMINOPHEN 325 MG/1
975 TABLET ORAL EVERY 8 HOURS
Status: DISPENSED | OUTPATIENT
Start: 2025-07-29 | End: 2025-08-01

## 2025-07-29 RX ADMIN — DAPTOMYCIN 700 MG: 500 INJECTION, POWDER, LYOPHILIZED, FOR SOLUTION INTRAVENOUS at 00:04

## 2025-07-29 RX ADMIN — TRANEXAMIC ACID 1 G: 1 INJECTION, SOLUTION INTRAVENOUS at 12:25

## 2025-07-29 RX ADMIN — SODIUM CHLORIDE, SODIUM LACTATE, POTASSIUM CHLORIDE, AND CALCIUM CHLORIDE: .6; .31; .03; .02 INJECTION, SOLUTION INTRAVENOUS at 17:06

## 2025-07-29 RX ADMIN — DEXMEDETOMIDINE HYDROCHLORIDE 12 MCG: 100 INJECTION, SOLUTION INTRAVENOUS at 13:23

## 2025-07-29 RX ADMIN — PROPOFOL 250 MG: 10 INJECTION, EMULSION INTRAVENOUS at 12:25

## 2025-07-29 RX ADMIN — MIRABEGRON 50 MG: 50 TABLET, EXTENDED RELEASE ORAL at 16:53

## 2025-07-29 RX ADMIN — PRAZOSIN HYDROCHLORIDE 3 MG: 1 CAPSULE ORAL at 00:04

## 2025-07-29 RX ADMIN — HYDROMORPHONE HYDROCHLORIDE 0.4 MG: 0.2 INJECTION, SOLUTION INTRAMUSCULAR; INTRAVENOUS; SUBCUTANEOUS at 20:23

## 2025-07-29 RX ADMIN — Medication 3 G: at 12:12

## 2025-07-29 RX ADMIN — ONDANSETRON 4 MG: 2 INJECTION INTRAMUSCULAR; INTRAVENOUS at 12:54

## 2025-07-29 RX ADMIN — CEFAZOLIN SODIUM 2 G: 2 SOLUTION INTRAVENOUS at 20:07

## 2025-07-29 RX ADMIN — DOXYCYCLINE HYCLATE 50 MG: 50 CAPSULE ORAL at 22:48

## 2025-07-29 RX ADMIN — TRANEXAMIC ACID 1 G: 1 INJECTION, SOLUTION INTRAVENOUS at 13:10

## 2025-07-29 RX ADMIN — FUROSEMIDE 40 MG: 10 INJECTION, SOLUTION INTRAMUSCULAR; INTRAVENOUS at 16:57

## 2025-07-29 RX ADMIN — DAPTOMYCIN 1000 MG: 500 INJECTION, POWDER, LYOPHILIZED, FOR SOLUTION INTRAVENOUS at 22:52

## 2025-07-29 RX ADMIN — Medication 200 MG: at 16:54

## 2025-07-29 RX ADMIN — PRAZOSIN HYDROCHLORIDE 3 MG: 1 CAPSULE ORAL at 22:49

## 2025-07-29 RX ADMIN — OLANZAPINE 15 MG: 10 TABLET, FILM COATED ORAL at 22:48

## 2025-07-29 RX ADMIN — FENTANYL CITRATE 50 MCG: 50 INJECTION INTRAMUSCULAR; INTRAVENOUS at 12:52

## 2025-07-29 RX ADMIN — LIDOCAINE HYDROCHLORIDE 50 MG: 20 INJECTION, SOLUTION INFILTRATION; PERINEURAL at 12:25

## 2025-07-29 RX ADMIN — OXYBUTYNIN CHLORIDE 30 MG: 5 TABLET, EXTENDED RELEASE ORAL at 10:28

## 2025-07-29 RX ADMIN — RIFAMPIN 300 MG: 300 CAPSULE ORAL at 10:28

## 2025-07-29 RX ADMIN — FENTANYL CITRATE 100 MCG: 50 INJECTION INTRAMUSCULAR; INTRAVENOUS at 12:25

## 2025-07-29 RX ADMIN — RIFAMPIN 300 MG: 300 CAPSULE ORAL at 21:17

## 2025-07-29 RX ADMIN — ALLOPURINOL 300 MG: 300 TABLET ORAL at 10:28

## 2025-07-29 RX ADMIN — DULOXETINE 120 MG: 60 CAPSULE, DELAYED RELEASE ORAL at 10:28

## 2025-07-29 RX ADMIN — PHENYLEPHRINE HYDROCHLORIDE 200 MCG: 10 INJECTION INTRAVENOUS at 12:27

## 2025-07-29 RX ADMIN — DEXMEDETOMIDINE HYDROCHLORIDE 8 MCG: 100 INJECTION, SOLUTION INTRAVENOUS at 13:26

## 2025-07-29 RX ADMIN — RIFAMPIN 300 MG: 300 CAPSULE ORAL at 00:05

## 2025-07-29 RX ADMIN — SODIUM CHLORIDE, SODIUM LACTATE, POTASSIUM CHLORIDE, AND CALCIUM CHLORIDE: .6; .31; .03; .02 INJECTION, SOLUTION INTRAVENOUS at 11:53

## 2025-07-29 RX ADMIN — FAMOTIDINE 20 MG: 20 TABLET, FILM COATED ORAL at 21:17

## 2025-07-29 RX ADMIN — MIDAZOLAM 2 MG: 1 INJECTION INTRAMUSCULAR; INTRAVENOUS at 12:12

## 2025-07-29 RX ADMIN — ROCURONIUM BROMIDE 100 MG: 50 INJECTION, SOLUTION INTRAVENOUS at 12:25

## 2025-07-29 RX ADMIN — FENTANYL CITRATE 50 MCG: 50 INJECTION INTRAMUSCULAR; INTRAVENOUS at 12:51

## 2025-07-29 RX ADMIN — HYDROMORPHONE HYDROCHLORIDE 0.5 MG: 1 INJECTION, SOLUTION INTRAMUSCULAR; INTRAVENOUS; SUBCUTANEOUS at 13:13

## 2025-07-29 RX ADMIN — HYDROMORPHONE HYDROCHLORIDE 0.5 MG: 1 INJECTION, SOLUTION INTRAMUSCULAR; INTRAVENOUS; SUBCUTANEOUS at 13:22

## 2025-07-29 RX ADMIN — SUGAMMADEX 300 MG: 100 INJECTION, SOLUTION INTRAVENOUS at 13:34

## 2025-07-29 RX ADMIN — ACETAMINOPHEN 975 MG: 325 TABLET ORAL at 16:50

## 2025-07-29 RX ADMIN — FERROUS SULFATE TAB 325 MG (65 MG ELEMENTAL FE) 325 MG: 325 (65 FE) TAB at 10:28

## 2025-07-29 RX ADMIN — ASPIRIN 325 MG: 325 TABLET, COATED ORAL at 21:17

## 2025-07-29 RX ADMIN — ASPIRIN 325 MG: 325 TABLET, COATED ORAL at 10:28

## 2025-07-29 ASSESSMENT — ACTIVITIES OF DAILY LIVING (ADL)
ADLS_ACUITY_SCORE: 37
ADLS_ACUITY_SCORE: 40
ADLS_ACUITY_SCORE: 37
ADLS_ACUITY_SCORE: 42
ADLS_ACUITY_SCORE: 37
ADLS_ACUITY_SCORE: 40
ADLS_ACUITY_SCORE: 40
ADLS_ACUITY_SCORE: 37
ADLS_ACUITY_SCORE: 42
ADLS_ACUITY_SCORE: 37
ADLS_ACUITY_SCORE: 40
ADLS_ACUITY_SCORE: 40
ADLS_ACUITY_SCORE: 37
ADLS_ACUITY_SCORE: 42
ADLS_ACUITY_SCORE: 42
ADLS_ACUITY_SCORE: 37
ADLS_ACUITY_SCORE: 37
ADLS_ACUITY_SCORE: 42
ADLS_ACUITY_SCORE: 42
ADLS_ACUITY_SCORE: 37
ADLS_ACUITY_SCORE: 42

## 2025-07-29 ASSESSMENT — LIFESTYLE VARIABLES: TOBACCO_USE: 1

## 2025-07-29 NOTE — ED NOTES
[facility]  ED Nurse Handoff Report    ED Chief complaint: Post-op Problem  . ED Diagnosis:   Final diagnoses:   Encounter for post surgical wound check   Postoperative hemorrhage of musculoskeletal structure following musculoskeletal procedure       Allergies:   Allergies   Allergen Reactions    Other [No Clinical Screening - See Comments] Other (See Comments)     Awoke from anesthesia with anger and ripping off dressing, after interstim placement, outside hospital 2013       Code Status: Full Code    Activity level - Baseline/Home:  assist of 2.  Activity Level - Current:   assist of 2.   Lift room needed: No.   Bariatric: No   Needed: No   Isolation: Contact  Infection:   MRSA.     Respiratory status: Room air    Vital Signs (within 30 minutes):   Vitals:    07/28/25 1845 07/28/25 1900 07/28/25 1920 07/28/25 1930   BP: (!) 164/87 (!) 167/76 (!) 167/74 (!) 180/98   Pulse: 93 81 101 77   Resp: 24 (!) 8 20 17   Temp:       TempSrc:       SpO2: 97% 97% 99% 98%   Weight:       Height:           Cardiac Rhythm:  ,      Pain level:    Patient confused: No.   Patient Falls Risk: bed/chair alarm on, nonskid shoes/slippers when out of bed, arm band in place, patient and family education, and assistive device/personal items within reach.   Elimination Status: Has voided     Patient Report - Initial Complaint: Post op bleeding- leaking wound vac.   Focused Assessment:  Kimo Greenwood is a 44 year old male with presenting with post- op problem. In April 2025, underwent a hip replacement. More recently, returned to the hospital due to concern for infection at the surgical site, described as significant swelling with one hip appearing higher than the other. Underwent surgical removal of the infection on Monday, July 21, 2025. Since that procedure, has had persistent drainage from the surgical site, with ongoing attempts to manage the drainage. Was discharged home with canisters to collect  drainage, but experienced rapid filling of all provided canisters and continued significant bleeding from the bandage, resulting in blood leakage at home. Reports that the only activities after discharge were riding home and walking into the apartment. States feeling fine currently, with no lightheadedness or pain at the surgical site. Denies any new symptoms since discharge. Reports receiving blood thinner injections in the abdomen during the recent hospitalization. States that since going home, the bandage has required more frequent changes due to increased drainage. No mention of fever or other systemic symptoms. Has a PICC line in place for planned antibiotic administration at home.     Abnormal Results:   Labs Ordered and Resulted from Time of ED Arrival to Time of ED Departure   BASIC METABOLIC PANEL (LIMITED OCCURRENCES) - Abnormal       Result Value    Sodium 140      Potassium 3.7      Chloride 105      Carbon Dioxide (CO2) 26      Anion Gap 9      Urea Nitrogen 11.3      Creatinine 1.40 (*)     GFR Estimate 64      Calcium 9.4      Glucose 122 (*)    CBC WITH PLATELETS AND DIFFERENTIAL - Abnormal    WBC Count 10.9      RBC Count 2.75 (*)     Hemoglobin 7.7 (*)     Hematocrit 23.5 (*)     MCV 86      MCH 28.0      MCHC 32.8      RDW 14.2      Platelet Count 396      % Neutrophils 70      % Lymphocytes 19      % Monocytes 7      % Eosinophils 3      % Basophils 0      % Immature Granulocytes 1      NRBCs per 100 WBC 0      Absolute Neutrophils 7.7      Absolute Lymphocytes 2.1      Absolute Monocytes 0.8      Absolute Eosinophils 0.3      Absolute Basophils 0.0      Absolute Immature Granulocytes 0.1      Absolute NRBCs 0.0          CT Hip Right w Contrast   Final Result   IMPRESSION:   1.  Marked interval decrease in size of the fluid and gas collection along the right hip surgical tract which is likely secondary to interval intervention.   2.  Multiple enlarged right inguinal and right pelvic lymph nodes  which are likely reactive.                Treatments provided: See MAR  Family Comments: none  OBS brochure/video discussed/provided to patient:  No  ED Medications:   Medications   sodium chloride 0.9 % bag for CT scan flush (65 mLs Intravenous $Given 7/28/25 1831)   iopamidol (ISOVUE-370) solution 500 mL (100 mLs Intravenous $Given 7/28/25 1830)       Drips infusing:  No  For the majority of the shift this patient was Green.   Interventions performed were n/a.    Sepsis treatment initiated: No    Cares/treatment/interventions/medications to be completed following ED care: n/a    ED Nurse Name: Greta Cabello RN  7:48 PM

## 2025-07-29 NOTE — ANESTHESIA POSTPROCEDURE EVALUATION
Patient: Kimo Greenwood    Procedure: Procedure(s):  Irrigation and debridement, evacuation hematoma hip       Anesthesia Type:  General    Note:  Disposition: Outpatient   Postop Pain Control: Uneventful            Sign Out: Well controlled pain   PONV: No   Neuro/Psych: Uneventful            Sign Out: Acceptable/Baseline neuro status   Airway/Respiratory: Uneventful            Sign Out: Acceptable/Baseline resp. status   CV/Hemodynamics: Uneventful            Sign Out: Acceptable CV status; No obvious hypovolemia; No obvious fluid overload   Other NRE: NONE   DID A NON-ROUTINE EVENT OCCUR? No           Last vitals:  Vitals Value Taken Time   /58 07/29/25 14:15   Temp 97.8  F (36.6  C) 07/29/25 14:15   Pulse 89 07/29/25 14:15   Resp 14 07/29/25 14:28   SpO2 99 % 07/29/25 14:28   Vitals shown include unfiled device data.    Electronically Signed By: Semaj Cavazos MD  July 29, 2025  4:09 PM

## 2025-07-29 NOTE — PLAN OF CARE
"Goal Outcome Evaluation:      Plan of Care Reviewed With: patient    Overall Patient Progress: no changeOverall Patient Progress: no change    Outcome Evaluation: Patient slept between cares. Tylenol for pain. Right hip wound vac dressing has increased drainage. Bilateral lower extremities elevated. IV abx infused.     Plan: Continue daptomycin. ID and orthopedics consulted.    /60 (BP Location: Left arm)   Pulse 85   Temp 98.1  F (36.7  C) (Oral)   Resp 16   Ht 1.829 m (6')   Wt (!) 167.4 kg (369 lb 0.8 oz)   SpO2 98%   BMI 50.05 kg/m      Problem: Adult Inpatient Plan of Care  Goal: Plan of Care Review  Description: The Plan of Care Review/Shift note should be completed every shift.  The Outcome Evaluation is a brief statement about your assessment that the patient is improving, declining, or no change.  This information will be displayed automatically on your shift  note.  Outcome: Not Progressing  Flowsheets (Taken 7/29/2025 0549)  Outcome Evaluation: Patient slept between cares. Tylenol for pain. Right hip wound vac dressing has increased drainage.  Plan of Care Reviewed With: patient  Overall Patient Progress: no change  Goal: Patient-Specific Goal (Individualized)  Description: You can add care plan individualizations to a care plan. Examples of Individualization might be:  \"Parent requests to be called daily at 9am for status\", \"I have a hard time hearing out of my right ear\", or \"Do not touch me to wake me up as it startles  me\".  Outcome: Not Progressing  Goal: Absence of Hospital-Acquired Illness or Injury  Outcome: Not Progressing  Intervention: Prevent Skin Injury  Recent Flowsheet Documentation  Taken 7/28/2025 2201 by Kassandra Walker RN  Body Position: position changed independently  Intervention: Prevent and Manage VTE (Venous Thromboembolism) Risk  Recent Flowsheet Documentation  Taken 7/28/2025 2201 by Kassandra Walker RN  VTE Prevention/Management: (refused)   SCDs off (sequential " compression devices)   patient refused intervention  Intervention: Prevent Infection  Recent Flowsheet Documentation  Taken 7/28/2025 2201 by Kassandra Walker, RN  Infection Prevention:   rest/sleep promoted   single patient room provided  Goal: Optimal Comfort and Wellbeing  Outcome: Not Progressing  Goal: Readiness for Transition of Care  Outcome: Not Progressing  Intervention: Mutually Develop Transition Plan  Recent Flowsheet Documentation  Taken 7/29/2025 0000 by Kassandra Walker, RN  Equipment Currently Used at Home:   cane, straight   walker, rolling   grab bar, toilet   grab bar, tub/shower   shower chair   dressing device     Problem: Infection  Goal: Absence of Infection Signs and Symptoms  Outcome: Not Progressing

## 2025-07-29 NOTE — CONSULTS
WOC consulted for right hip wound- currently has on a prevena vac but has copious amounts of drainage so ordered a hospital vac and canister to switch him over to a unit/canister that could handle the drainage. Then the floor received a call that pt was RTOR with Ortho today. WOC will sign off and defer to Ortho. Thanks!

## 2025-07-29 NOTE — ANESTHESIA PREPROCEDURE EVALUATION
Anesthesia Pre-Procedure Evaluation    Patient: Kimo Greenwood   MRN: [id number] : 1980          Procedure : Procedure(s):  Irrigation and debridement, evacuation hematoma hip         Past Medical History:   Diagnosis Date    Arthritis     DDD hips and knees    Asthma     Asthma     Patel's esophagus     Chronic pain     Chronic pain - left hip/leg pain     degenerative disc disease, back, hips, knees    Gastroesophageal reflux disease     History of anesthesia complications     agitation x1 after interstim     Hypertension     Hypertension     Leukocytosis     LPRD (laryngopharyngeal reflux disease)     Marijuana abuse     Marijuana abuse     MDD (major depressive disorder)     MDD (major depressive disorder)     Mental retardation, mild (I.Q. 50-70)     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) 2010    With associated speech/language delay    Obesity 2010    LOREN (obstructive sleep apnea)     Uses a CPAP    LOREN (obstructive sleep apnea)     Seborrheic dermatitis     Seborrheic dermatitis     Sleep apnea     CPAP      Past Surgical History:   Procedure Laterality Date    ARTHROPLASTY HIP Left 2017    Procedure: ARTHROPLASTY HIP;  Surgeon: Bala Randall MD;  Location: [facility]    ARTHROPLASTY HIP Left     ARTHROPLASTY HIP Right 2025    Procedure: Right total hip arthroplasty;  Surgeon: Bala Randall MD;  Location: [facility]    ARTHROPLASTY REVISION HIP Left 2019    Procedure: Revision left total hip arthroplasty with a head and liner exchange to a Biomet dual mobility head and liner.;  Surgeon: Bala Randall MD;  Location:  OR    BACK SURGERY      BLADDER SURGERY      Fred, Alida,Interstem stimulator implant    CLOSED REDUCTION HIP Left 06/10/2019    Procedure: Closed reduction under general anesthesia left recurrent prosthetic hip dislocation;  Surgeon: Rafat Cazares MD;  Location:  OR    COLONOSCOPY N/A 10/30/2015    Procedure:  COMBINED COLONOSCOPY, SINGLE OR MULTIPLE BIOPSY/POLYPECTOMY BY BIOPSY;  Surgeon: Alexis Jackson MD;  Location: [facility]    COLONOSCOPY N/A 11/17/2016    Procedure: COMBINED COLONOSCOPY, SINGLE OR MULTIPLE BIOPSY/POLYPECTOMY BY BIOPSY;  Surgeon: Cat Huber MD;  Location: [facility]    COLONOSCOPY N/A 10/28/2020    Procedure: COLONOSCOPY;  Surgeon: You Kraus MD;  Location: [facility]    COLONOSCOPY      ESOPHAGOSCOPY, GASTROSCOPY, DUODENOSCOPY (EGD), COMBINED N/A 11/17/2016    Procedure: COMBINED ESOPHAGOSCOPY, GASTROSCOPY, DUODENOSCOPY (EGD), BIOPSY SINGLE OR MULTIPLE;  Surgeon: Cat Huber MD;  Location: [facility]    ESOPHAGOSCOPY, GASTROSCOPY, DUODENOSCOPY (EGD), COMBINED N/A 03/12/2020    Procedure: ESOPHAGOGASTRODUODENOSCOPY WITH BIOPSIES;  Surgeon: You Kraus MD;  Location: [facility]    ESOPHAGOSCOPY, GASTROSCOPY, DUODENOSCOPY (EGD), COMBINED N/A 10/28/2020    Procedure: ESOPHAGOGASTRODUODENOSCOPY, WITH BIOPSY;  Surgeon: You Kraus MD;  Location: [facility]    ESOPHAGOSCOPY, GASTROSCOPY, DUODENOSCOPY (EGD), COMBINED      ESOPHAGOSCOPY, GASTROSCOPY, DUODENOSCOPY (EGD), COMBINED N/A 11/29/2023    Procedure: Esophagoscopy, gastroscopy, duodenoscopy (EGD), combined with biopsies using forceps;  Surgeon: You Kraus MD;  Location: [facility]    EXAM UNDER ANESTHESIA, FULGURATE CONDYLOMA ANUS, COMBINED N/A 12/22/2016    Procedure: COMBINED EXAM UNDER ANESTHESIA, FULGURATE CONDYLOMA ANUS;  Surgeon: Nya Cabello MD;  Location: [facility]    GENITOURINARY SURGERY      INCISION AND DRAINAGE HIP, COMBINED Right 7/21/2025    Procedure: Incision and drainage hip, combined, with revision of head and acetabular liner;  Surgeon: Bala Randall MD;  Location: [facility]    JOINT REPLACEMENT Left 01/01/2017    MARGO, Revision Left MARGO    OTHER SURGICAL HISTORY      interstim stimulator implant    RI CYSTOURETHROSCOPY INJ CHEMODENERVATION BLADDER N/A 01/16/2019    Procedure:  CYSTOSCOPY BOTOX BLADDER INJECTIONS (100 UNITS IN 10CC);  Surgeon: Didier Ibarra MD;  Location: [facility] OR;  Service: Urology    TX IMPLANT PERIPH/GASTRIC NEUROSTIM/ N/A 2020    Procedure: NEW INTERSTIM LEAD, REPLACE OLD; NEW INTERSTIM BATTERY, REPLACE OLD;  Surgeon: Didier Ibarra MD;  Location: [facility];  Service: Urology      Allergies   Allergen Reactions    Other [No Clinical Screening - See Comments] Other (See Comments)     Awoke from anesthesia with anger and ripping off dressing, after interstim placement, outside hospital       Social History     Tobacco Use    Smoking status: Former     Current packs/day: 0.00     Average packs/day: 1 pack/day for 1 year (1.0 ttl pk-yrs)     Types: Cigarettes, Other     Quit date: 2025     Years since quittin.4     Passive exposure: Current    Smokeless tobacco: Former     Types: Chew     Quit date: 2025    Tobacco comments:     Switched to an ecig with 0% nicotine. Current Marijuana Used.    Substance Use Topics    Alcohol use: Yes     Comment: socially      Wt Readings from Last 1 Encounters:   25 (!) 167.4 kg (369 lb 0.8 oz)        Anesthesia Evaluation        History of anesthetic complications       ROS/MED HX  ENT/Pulmonary:     (+) sleep apnea,               tobacco use, Past use,    Intermittent, asthma  Treatment: Inhaler prn,                 Neurologic:       Cardiovascular: Comment: Echo: Date:  Results:  Interpretation Summary     Left ventricular size, wall motion and function are normal. The ejection  fraction is 60-65%.  Normal right ventricle size and systolic function.  There is no pericardial effusion.  Fat pad noted.  No hemodynamically significant valvular abnormalities on 2D or color flow  imaging. The study was technically difficult.      (+)  hypertension- -   -  - -                                      METS/Exercise Tolerance:     Hematologic: Comments: Lab Test        25      07/28/25 07/27/25 07/24/25 07/23/25 07/22/25 07/21/25 07/20/25 07/20/25 07/19/25                       0644          1740          0535          0504          0552          0735          1206          1209          0721          1434          WBC          8.0          10.9          --           --          9.0            < >         --           --            < >         --           HGB          7.6*         7.7*          --           --          7.6*           < >        9.7*          --            < >         --           MCV          85           86           85             < >        85  85        < >        85            --            < >         --           PLT          332          396          366            < >         --            < >         --           --            < >         --           INR           --           --           --           --           --           --          1.20*        1.09          --          1.09           < > = values in this interval not displayed.                  Lab Test        07/29/25 07/28/25 07/28/25                       0644          1740          0517          NA           142          140          143           POTASSIUM    4.0          3.7          4.1           CHLORIDE     108*         105          106           CO2          27           26           27            BUN          10.6         11.3         12.2          CR           1.47*        1.40*        1.44*         ANIONGAP     7            9            10            FILEMON          9.1          9.4          9.2           GLC          106*         122*         93                (+)      anemia,          Musculoskeletal:       GI/Hepatic:     (+) GERD, Asymptomatic on medication,                  Renal/Genitourinary:       Endo:     (+)               Obesity,       Psychiatric/Substance Use:     (+) psychiatric history depression       Infectious Disease:      "  Malignancy:       Other:      (+)  , H/O Chronic Pain,, other significant disability Other (comment) (Mild mental retardation, lives alone, mother is guardian)           Physical Exam  Airway  Mallampati: II  TM distance: >3 FB  Neck ROM: full  Mouth opening: >= 4 cm    Cardiovascular - normal exam Comments: Echo: Date: 2023 Results:  Interpretation Summary     Left ventricular size, wall motion and function are normal. The ejection  fraction is 60-65%.  Normal right ventricle size and systolic function.  There is no pericardial effusion.  Fat pad noted.  No hemodynamically significant valvular abnormalities on 2D or color flow  imaging. The study was technically difficult.    Dental   (+) Minor Abnormalities - some fillings, tiny chips      Pulmonary - normal exam      Neurological - normal exam  He appears awake, alert and oriented x3.    Other Findings       OUTSIDE LABS:  CBC:   Lab Results   Component Value Date    WBC 8.0 07/29/2025    WBC 10.9 07/28/2025    HGB 7.6 (L) 07/29/2025    HGB 7.7 (L) 07/28/2025    HCT 22.6 (L) 07/29/2025    HCT 23.5 (L) 07/28/2025     07/29/2025     07/28/2025     BMP:   Lab Results   Component Value Date     07/29/2025     07/28/2025    POTASSIUM 4.0 07/29/2025    POTASSIUM 3.7 07/28/2025    CHLORIDE 108 (H) 07/29/2025    CHLORIDE 105 07/28/2025    CO2 27 07/29/2025    CO2 26 07/28/2025    BUN 10.6 07/29/2025    BUN 11.3 07/28/2025    CR 1.47 (H) 07/29/2025    CR 1.40 (H) 07/28/2025     (H) 07/29/2025     (H) 07/28/2025     COAGS:   Lab Results   Component Value Date    PTT 35 07/21/2025    INR 1.20 (H) 07/21/2025    FIBR 605 (H) 07/21/2025     POC: No results found for: \"BGM\", \"HCG\", \"HCGS\"  HEPATIC:   Lab Results   Component Value Date    ALBUMIN 3.1 (L) 07/19/2025    PROTTOTAL 6.8 07/19/2025    ALT 21 07/19/2025    AST 15 07/19/2025    ALKPHOS 67 07/19/2025    BILITOTAL 0.3 07/19/2025     OTHER:   Lab Results   Component Value Date    " LACT 0.8 07/19/2025    A1C 5.4 08/08/2023    FILEMON 9.1 07/29/2025    MAG 1.6 (L) 07/29/2025    LIPASE 99 04/18/2022    TSH 2.84 06/27/2023    T4 1.38 04/10/2012    CRP 14.0 (H) 06/18/2019    SED 76 (H) 07/19/2025       Anesthesia Plan    ASA Status:  3      NPO Status: NPO Appropriate   Anesthesia Type: General.  Induction: intravenous.  Maintenance: Balanced.   Techniques and Equipment:       - Monitoring Plan: standard ASA monitoring  Equipment Comments: ETT     Consents    Anesthesia Plan(s) and associated risks, benefits, and realistic alternatives discussed. Questions answered and patient/representative(s) expressed understanding.     - Discussed: anesthesiologist     - Discussed with:  Patient          Blood Consent:      - Discussed with: patient.     Postoperative Care    Pain management: non-narcotic analgesics.     Comments:                   Semaj Cavazos MD    I have reviewed the pertinent notes and labs in the chart from the past 30 days and (re)examined the patient.  Any updates or changes from those notes are reflected in this note.    Clinically Significant Risk Factors Present on Admission          # Hyperchloremia: Highest Cl = 108 mmol/L in last 2 days, will monitor as appropriate        # Hypomagnesemia: Lowest Mg = 1.6 mg/dL in last 2 days, will replace as needed     # Drug Induced Platelet Defect: home medication list includes an antiplatelet medication   # Hypertension: Noted on problem list      # Anemia: based on hgb <11       # Morbid Obesity: Estimated body mass index is 50.05 kg/m  as calculated from the following:    Height as of this encounter: 1.829 m (6').    Weight as of this encounter: 167.4 kg (369 lb 0.8 oz).       # Asthma: noted on problem list

## 2025-07-29 NOTE — PLAN OF CARE
"Goal Outcome Evaluation:      Plan of Care Reviewed With: patient    Overall Patient Progress: improvingOverall Patient Progress: improving    Outcome Evaluation: I&D and Evacuation Hemetoma Hip done today.  On CAPNO. O2 Sat 96% on room air. LS clear. BS+. C/o R hip pain. Scheduled Tylenol given. Had some pain relief. Denies nausea or SOB. VSS. CMS intact. Negative Pressure Wound Vac in place and patent.    Problem: Adult Inpatient Plan of Care  Goal: Plan of Care Review  Description: The Plan of Care Review/Shift note should be completed every shift.  The Outcome Evaluation is a brief statement about your assessment that the patient is improving, declining, or no change.  This information will be displayed automatically on your shift  note.  7/29/2025 1851 by Debora Clarke RN  Outcome: Progressing  Flowsheets (Taken 7/29/2025 1851)  Outcome Evaluation: I&D and Evacuation Hemetoma Hip done today.  On CAPNO. O2 Sat 96% on room air. LS clear. BS+. C/o R hip pain. Scheduled Tylenol given. Had some pain relief. Denies nausea or SOB. VSS. CMS intact. Negative Pressure Wound Vac in place and patent.  Plan of Care Reviewed With: patient  Overall Patient Progress: improving  7/29/2025 1841 by Debora Clarke RN  Outcome: Progressing  Flowsheets (Taken 7/29/2025 1841)  Plan of Care Reviewed With: patient  Overall Patient Progress: improving  Goal: Patient-Specific Goal (Individualized)  Description: You can add care plan individualizations to a care plan. Examples of Individualization might be:  \"Parent requests to be called daily at 9am for status\", \"I have a hard time hearing out of my right ear\", or \"Do not touch me to wake me up as it startles  me\".  7/29/2025 1851 by Debora Clarke RN  Outcome: Progressing  7/29/2025 1841 by Debora Clarke RN  Outcome: Progressing  Goal: Absence of Hospital-Acquired Illness or Injury  7/29/2025 1851 by Debora Clarke, RN  Outcome: Progressing  7/29/2025 1841 by Debora Clarke, " RN  Outcome: Progressing  Intervention: Identify and Manage Fall Risk  Recent Flowsheet Documentation  Taken 7/29/2025 1638 by Debora Clarke RN  Safety Promotion/Fall Prevention: safety round/check completed  Intervention: Prevent Skin Injury  Recent Flowsheet Documentation  Taken 7/29/2025 1638 by Debora Clarke RN  Body Position: position changed independently  Intervention: Prevent and Manage VTE (Venous Thromboembolism) Risk  Recent Flowsheet Documentation  Taken 7/29/2025 1638 by Debora Clarke RN  VTE Prevention/Management: SCDs off (sequential compression devices)  Intervention: Prevent Infection  Recent Flowsheet Documentation  Taken 7/29/2025 1638 by Debora Clarke RN  Infection Prevention:   hand hygiene promoted   personal protective equipment utilized   single patient room provided  Goal: Optimal Comfort and Wellbeing  7/29/2025 1851 by Debora Clarke RN  Outcome: Progressing  7/29/2025 1841 by Debora Clarke RN  Outcome: Progressing  Intervention: Monitor Pain and Promote Comfort  Recent Flowsheet Documentation  Taken 7/29/2025 1737 by Debora Clarke RN  Pain Management Interventions: relaxation techniques promoted  Taken 7/29/2025 1638 by Debora Clarke RN  Pain Management Interventions: medication (see MAR)  Goal: Readiness for Transition of Care  7/29/2025 1851 by Debora Clarke RN  Outcome: Progressing  7/29/2025 1841 by Debora Clarke RN  Outcome: Progressing

## 2025-07-29 NOTE — ANESTHESIA PROCEDURE NOTES
Airway       Patient location during procedure: OR       Procedure Start/Stop Times: 7/29/2025 12:24 PM  Staff -        Anesthesiologist:  Semaj Cavazos MD       CRNA: Severson, Dean Dennis, APRN CRNA       Performed By: CRNA  Consent for Airway        Urgency: elective  Indications and Patient Condition       Indications for airway management: cisco-procedural and airway protection       Induction type:intravenous       Mask difficulty assessment: 2 - vent by mask + OA or adjuvant +/- NMBA    Final Airway Details       Final airway type: endotracheal airway       Successful airway: ETT - single and Oral  Endotracheal Airway Details        ETT size (mm): 8.0       Cuffed: yes       Successful intubation technique: video laryngoscopy       VL Blade Size: Glidescope 4       Grade View of Cords: 2       Adjucts: stylet       Position: Center       Measured from: lips       Secured at (cm): 25       Bite block used: Soft    Post intubation assessment        Placement verified by: capnometry, equal breath sounds and chest rise        Number of attempts at approach: 1       Number of other approaches attempted: 0       Secured with: tape       Ease of procedure: easy       Dentition: Intact and Unchanged    Medication(s) Administered   Medication Administration Time: 7/29/2025 12:24 PM

## 2025-07-29 NOTE — PROGRESS NOTES
[facility]    Hospitalist Progress Note  Name: Kimo Greenwood    MRN: [id number]  Provider:  Marlo Adams DO MPH  Date of Service: 07/29/2025    Summary of Stay: Kimo Greenwood is a 44 year old male admitted on 7/28/2025.  He is a morbidly obese male with a mild cognitive impairment who had right hip arthroplasty in April 2025 which was complicated by surgical site infection.  He had I&D and revision of had an acetabular liner and OR cultures grew MRSA.  He was discharged 7/28 with a right hip wound VAC and IV daptomycin at home.     When he reached home, his care manager noticed that the wound VAC was full of serosanguineous discharge.  The wound VAC was changed and he fell up 3 containers in quick succession and was therefore brought back to ER.  His care manager also had some concerns about his ability to take care of the wound VAC by himself.     He was otherwise afebrile and hemodynamically stable, rather high systolic blood pressure in 180s.  He was lying comfortably on the bed and eating a sandwich.  Had hemoglobin of 7.7 which is actually better than 7.65 days ago. BUN/creatinine of 11.3/1.4.  CT of the right hip showed marked interval decrease in the fluid and gas collection along the right hip surgical track.    He was seen by orthopedics and plan is for evacuation of hematoma and wound closure in the OR later today.  ID has also been consulted.     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  Hemoglobin is stable.  Check venous ultrasound bilateral lower extremities.  - Starting Lasix 40 mg IV twice daily as he is diuretic naïve and increase dose if needed.  Monitor BMP.  Elevate bilateral lower  extremities.  -Continue daptomycin.    -Consult ID and orthopedics.  -Plan for evacuation of hematoma and wound VAC closure in the OR today with Dr. Randall.     Acute kidney injury  - Appears to plateau around 1.4, monitor closely as patient is being started on diuretics.     Mild cognitive impairment  -Lives independently at his own apartment with close follow up visits from  who is concerned about his ability to manage wound VAC himself.  -Social work consult and likely needs TCU placement per his mother.     Essential hypertension  - Continue prazosin.     Chronic medical conditions  - Recent hip surgery.  On aspirin 325 twice daily for DVT prophylaxis.  - Morbid obesity.  On Ozempic at home, can be resumed on discharge.  - History of depression.  On Cymbalta and duloxetine.  - Bronchial asthma.  Not in exacerbation.  - History of Patel's esophagus and GERD.  Continue home regimen of PPI.  - Overactive bladder.  On oxybutynin.  - History of gout.  Was recently treated with colchicine and steroid burst.  Resumed allopurinol.  - Sleep apnea.  Continue CPAP.  - Subacute anemia.  Likely due to chronic disease, surgical/wound blood loss, and phlebotomy.  Monitor hemoglobin as patient has increased serosanguineous discharge.    DVT Prophylaxis: PCDs and ASA. Enoxaparin (Lovenox) subcutaneous on hold for surgery.  Code Status: Full Code  Diet: NPO for Procedure/Surgery per Anesthesia Guidelines Except for: Meds; Clear liquids before procedure/surgery: ADULT (Age GREATER than or Equal to 18 years) - Clear liquids 2 hours before procedure/surgery    Germain Catheter: Not present  Disposition: Expected discharge in 2-3 days to TCU. Goals prior to discharge include surgery and placement.   Incidental Findings: As above.  Family updated today: Yes mother at bedside.  Medically Ready for Discharge: Anticipated in 2-4 Days    40 MINUTES SPENT BY ME on the date of service doing chart review, history, exam,  documentation & further activities per the note.      Interval History   Assumed care from previous hospitalist. The history was fully reviewed.  The patient reports doing okay, just waking up and on CPAP. No chest pain or shortness of breath. No nausea, vomiting, diarrhea, constipation. No fevers. Ongoing wound drainage. No pain. No other specific complaints identified.     -Data reviewed today: I personally reviewed all new labs and imaging results over the last 24 hours.     Physical Exam   Temp: 97.9  F (36.6  C) Temp src: Oral BP: 126/63 Pulse: 91   Resp: 16 SpO2: 95 % O2 Device: None (Room air)    Vitals:    07/28/25 1657 07/28/25 2201   Weight: (!) 149.7 kg (330 lb) (!) 167.4 kg (369 lb 0.8 oz)     Vital Signs with Ranges  Temp:  [97.9  F (36.6  C)-98.5  F (36.9  C)] 97.9  F (36.6  C)  Pulse:  [] 91  Resp:  [8-26] 16  BP: (122-186)/() 126/63  FiO2 (%):  [21 %] 21 %  SpO2:  [95 %-100 %] 95 %  I/O last 3 completed shifts:  In: -   Out: 200 [Urine:200]    GENERAL: No apparent distress. Awake, alert, and fully oriented.  HEENT: Normocephalic, atraumatic. Extraocular movements intact.  CARDIOVASCULAR: Regular rate and rhythm without murmurs or rubs. No S3.  PULMONARY: Clear bilaterally.  GASTROINTESTINAL: Soft, non-tender, non-distended. Bowel sounds normoactive.   EXTREMITIES: No cyanosis or clubbing. Trace edema.  NEUROLOGICAL: CN 2-12 grossly intact, no focal neurological deficits.  DERMATOLOGICAL: No rash, ulcer, bruising, nor jaundice. Right leg wound vac in place.    Medications   Current Facility-Administered Medications   Medication Dose Route Frequency Provider Last Rate Last Admin     Current Facility-Administered Medications   Medication Dose Route Frequency Provider Last Rate Last Admin    acetaminophen (TYLENOL) tablet 650 mg  650 mg Oral Q6H Thai Thao MD   650 mg at 07/28/25 2335    allopurinol (ZYLOPRIM) tablet 300 mg  300 mg Oral Daily Thai Thao MD        aspirin (ASA) EC tablet  325 mg  325 mg Oral BID Thai Thao MD   325 mg at 07/28/25 2336    DAPTOmycin (CUBICIN) 700 mg in sodium chloride 0.9 % 100 mL intermittent infusion  6 mg/kg (Adjusted) Intravenous Q24H Thai Thao MD   700 mg at 07/29/25 0004    DULoxetine (CYMBALTA) DR capsule 120 mg  120 mg Oral Daily Thai Thao MD        ferrous sulfate (FEROSUL) tablet 325 mg  325 mg Oral Daily with breakfast Thai Thao MD        furosemide (LASIX) injection 40 mg  40 mg Intravenous BID Thai Thao MD        magnesium oxide (MAG-OX) half-tab 200 mg  200 mg Oral Daily Thai Thao MD        mirabegron (MYRBETRIQ) 24 hr tablet 50 mg  50 mg Oral Daily Thai Thao MD        OLANZapine (zyPREXA) tablet 15 mg  15 mg Oral At Bedtime Thai Thao MD   15 mg at 07/28/25 2336    oxyBUTYnin ER (DITROPAN XL) 24 hr tablet 30 mg  30 mg Oral Daily Thai Thao MD        [Held by provider] potassium gluconate tablet 2.5 mEq  2.5 mEq Oral Daily Thai Thao MD        prazosin (MINIPRESS) capsule 3 mg  3 mg Oral At Bedtime Thai Thao MD   3 mg at 07/29/25 0004    rifampin (RIFADIN) capsule 300 mg  300 mg Oral BID Thai Thao MD   300 mg at 07/29/25 0005    senna-docusate (SENOKOT-S/PERICOLACE) 8.6-50 MG per tablet 1 tablet  1 tablet Oral BID Thai Thao MD   1 tablet at 07/28/25 2336    Or    senna-docusate (SENOKOT-S/PERICOLACE) 8.6-50 MG per tablet 2 tablet  2 tablet Oral BID Thai Thao MD        sodium chloride (PF) 0.9% PF flush 3 mL  3 mL Intracatheter Q8H GIOVANY Thai Thao MD   3 mL at 07/28/25 2337     Data     Laboratory:  Recent Labs   Lab 07/29/25  0644 07/28/25  1740 07/27/25  0535 07/24/25  0504 07/23/25  0552   WBC 8.0 10.9  --   --  9.0   HGB 7.6* 7.7*  --   --  7.6*   HCT 22.6* 23.5*  --   --   --    MCV 85 86 85   < > 85  85    396 366   < >  --     < > = values in this interval not displayed.     Recent Labs   Lab 07/29/25  0644 07/28/25  1740 07/28/25  0517    140 143   POTASSIUM 4.0 3.7 4.1   CHLORIDE  "108* 105 106   CO2 27 26 27   ANIONGAP 7 9 10   * 122* 93   BUN 10.6 11.3 12.2   CR 1.47* 1.40* 1.44*   GFRESTIMATED 60* 64 62   FILEMON 9.1 9.4 9.2     No results for input(s): \"CULT\" in the last 168 hours.    Imaging:  Recent Results (from the past 24 hours)   CT Hip Right w Contrast    Narrative    EXAM: CT HIP RIGHT W CONTRAST  LOCATION: [facility]  DATE: 7/28/2025    INDICATION: Right hip surgery; pain and persistent bleeding.  COMPARISON: 07/19/2025  TECHNIQUE: Without and with IV contrast. Axial, sagittal and coronal thin-section reconstruction. Dose reduction techniques were used.   CONTRAST: 100 mL Isovue 370    FINDINGS:     BONES:  Status post right total hip arthroplasty. Components are in normal alignment. Metallic components create streak/beam-hardening artifact limiting evaluation of adjacent structures. No CT evidence of periprosthetic fracture. Unchanged cystic-appearing   lucent lesion within the right acetabulum which was also seen in 2023. Partial ankylosis of the right sacroiliac joint. Degenerative changes of the lower lumbar spine and pubic symphysis. Osseous demineralization.    SOFT TISSUES:  Since 07/19/2025, marked interval decrease in size of the fluid and gas collection along the right hip surgical tract which is likely secondary to interval intervention. Residual area of gas, fluid, and stranding/scarring measures approximately 3.8 x   10.0 x 16.5 cm (AP x ML x CC). Portions of the fluid contacts the right hip arthroplasty and right greater trochanter.    Multiple enlarged right inguinal and right pelvic lymph nodes which are likely reactive.    Right sacral neurostimulator. Focal fat within the right iliopsoas muscle consistent with sequela of prior injury/strain.      Impression    IMPRESSION:  1.  Marked interval decrease in size of the fluid and gas collection along the right hip surgical tract which is likely secondary to interval intervention.  2.  " Multiple enlarged right inguinal and right pelvic lymph nodes which are likely reactive.       US Lower Extremity Venous Duplex Bilateral    Narrative    EXAM: US LOWER EXTREMITY VENOUS DUPLEX BILATERAL  LOCATION: [facility]  DATE: 7/28/2025    INDICATION: Bilateral lower extremity pain, swelling and edema.  COMPARISON: None.  TECHNIQUE: Venous Duplex ultrasound of bilateral lower extremities with and without compression, augmentation and duplex. Color flow and spectral Doppler with waveform analysis performed.    FINDINGS: Exam includes the common femoral, femoral, popliteal veins as well as segmentally visualized deep calf veins and greater saphenous vein.     RIGHT: No deep vein thrombosis. No superficial thrombophlebitis. No popliteal cyst.    LEFT: No deep vein thrombosis. No superficial thrombophlebitis. No popliteal cyst.      Impression    IMPRESSION:  1.  No deep venous thrombosis in the bilateral lower extremities.         Marlo Adams DO MPH  [facility] Hospitalist  [address] [place] [place] [address]  07/29/2025

## 2025-07-29 NOTE — PHARMACY-ADMISSION MEDICATION HISTORY
"Pharmacy Intern Admission Medication History    Admission medication history is complete. The information provided in this note is only as accurate as the sources available at the time of the update.    Information Source(s): Patient via in-person    Pertinent Information: Per patient - since med rec was done on 7/19 he has been in the hospital and has not taken any home meds - anything he has taken was given by a nurse or provider. Used med rec done on 7/19 and dispense history to complete medication list. He was recently prescribed doxycycline, oxycodone, daptomycin injection, hydroxyzine, rifampin, and senna-docusate but none of these medications have been picked up or started since he has been in the hospital.    Changes made to PTA medication list:  Added: Doxycyline, oxycodone  Deleted: None  Changed: None    Allergies reviewed with patient and updates made in EHR: no    Medication History Completed By: Chen Linton 7/28/2025 10:09 PM    PTA Med List   Medication Sig Last Dose/Taking    acetaminophen (TYLENOL) 325 MG tablet Take 2 tablets (650 mg) by mouth every 6 hours. 7/18/2025    allopurinol (ZYLOPRIM) 300 MG tablet Take 1 tablet (300 mg) by mouth daily. 7/18/2025    aspirin (ASA) 325 MG EC tablet Take one Aspirin tab twice daily for 5 weeks. Taking    calcium polycarbophil (FIBER-LAX) 625 MG tablet Take 1 tablet (625 mg) by mouth daily. 7/18/2025    cholecalciferol (VITAMIN D3) 10 mcg (400 units) TABS tablet Take 10 mcg by mouth daily. 7/18/2025    DAPTOmycin (CUBICIN) in NS syringe Inject 13 mLs (650 mg) over 5-10 minutes into the vein via push every 24 hours. Taking    doxycycline hyclate (VIBRAMYCIN) 50 MG capsule Take 50 mg by mouth 2 times daily. Taking    DULoxetine (CYMBALTA) 60 MG capsule Take 120 mg by mouth daily  7/18/2025    Emergency Supply Kit, Central, Patient use for emergency only. Contents: 3 sodium chloride 0.9% flushes, 1 dressing kit, 1 microclave ext set 14\", 4 nitrile gloves " "(med), 6 alcohol prep pads, 1 bacitracin, 1 syringe (10 cc 20 G 1\"). Call [phone] to reorder. Taking As Needed    ferrous sulfate (FEROSUL) 325 (65 Fe) MG tablet TAKE 1 TABLET BY MOUTH DAILY WITH BREAKFAST 7/18/2025    hydrocortisone 2.5 % cream Apply topically 2 times daily. Apply a thin layer to face once daily for up to 2 weeks, decrease use as rash improves, repeat as needed for flares 7/18/2025    hydrOXYzine HCl (ATARAX) 25 MG tablet Take 1-2 tablets (25-50 mg) by mouth every 6 hours as needed for other (muscle spasms). Unknown    ketoconazole (NIZORAL) 2 % external cream Apply topically 2 times daily. Apply to rash on face twice daily until improved for seborrheic dermatitis Unknown    ketoconazole (NIZORAL) 2 % external shampoo Apply topically daily as needed for itching or irritation. 7/18/2025    loperamide (IMODIUM) 2 MG capsule Take 2 mg by mouth 4 times daily as needed for diarrhea. 7/18/2025    magnesium 250 MG tablet Take 1 tablet by mouth daily. 7/18/2025    mirabegron (MYRBETRIQ) 50 MG 24 hr tablet Take 1 tablet by mouth daily 7/18/2025    OLANZapine (ZYPREXA) 15 MG tablet Take 15 mg by mouth at bedtime. 7/18/2025    oxyBUTYnin ER (DITROPAN XL) 15 MG 24 hr tablet Take 2 tablets (30 mg) by mouth daily 7/18/2025    oxyCODONE (ROXICODONE) 5 MG tablet Take 5-10 mg by mouth every 6 hours as needed for severe pain. Taking As Needed    potassium gluconate 2.5 MEQ tablet Take 2.5 mEq by mouth daily. 7/18/2025    prazosin (MINIPRESS) 1 MG capsule Take 3 mg by mouth At Bedtime 7/18/2025    rifampin (RIFADIN) 300 MG capsule Take 1 capsule (300 mg) by mouth every 12 hours. Taking    senna-docusate (SENOKOT-S/PERICOLACE) 8.6-50 MG tablet Take 1 tablet by mouth 2 times daily. Taking    sodium chloride, PF, 0.9% PF flush Inject 10 mLs into the vein as needed for line flush. Flush IV before and after medication administration as directed and/or at least every 24 hours. Taking As Needed    tirzepatide-Weight " Management (ZEPBOUND) 12.5 MG/0.5ML prefilled pen Inject 0.5 mLs (12.5 mg) subcutaneously every 7 days. 7/14/2025

## 2025-07-29 NOTE — OP NOTE
[facility]  OPERATIVE NOTE    Pre-Op Diagnosis:  S/P total right hip arthroplasty [Z96.641]  Abscess of right hip [L02.415]  Post-Op Diagnosis: Same     Procedure(s):  Irrigation and debridement, evacuation hematoma hip    Surgeon: Bala Randall MD    Assistant: Judy Barragan PA-C - Assisting    Anesthesia Type: General    Estimated Blood Loss: 50 cc    Specimen(s): None  Complications: None  Findings: As expected    Implant(s): * No implants in log *    Indication for Procedure:   The patient is a 44 year old male with history of a right total hip arthroplasty about 12 weeks ago.  This was complicated by a postoperative infection for which she underwent a headliner exchange with placement of antibiotic impregnated beads 8 days ago.  Cultures from that surgery grew MRSA and the patient has been on IV daptomycin.  Over the last 24 hours had increased drainage from his wound and was readmitted to the hospital.  We discussed treatment options, I recommended open irrigation debridement of the wound with wound closure.  We discussed the risks and benefits of this procedure.  After this discussion he wanted proceed with surgery.      Procedure Summary Narrative:  Kimo was taken to the operating room and placed on the operating table in the supine position.  After adequate induction of a general anesthetic he was transferred to the lateral position and held this way with the Andrew hip positioner. An axillary roll was placed, and care was taken to pad all bony prominences. He was given 2 g of Ancef for infection prophylaxis 30 min prior to incision. We then performed a sterile prep and drape of the right hip and lower extremity.  After sterile prep and drape we opened the center one half of his wound.  This was the area that was actively draining and appeared that the skin had not healed.  Proximally and distally the skin had healed nicely and the sutures were left intact.  Once we open the incision, a large  amount of serous fluid was evacuated from the subcutaneous space.  We explored the space and debrided it bluntly.  We then thoroughly irrigated the wound with 3 L of saline and a dilute Betadine wash.  We excised the skin edges of the area of the wound that we opened.  We we then put 1 g of powdered vancomycin within the wound.  We closed the wound with absorbable stitches and nylon sutures in the skin.  The patient was placed in a Prevena suction dressing.   He tolerated the procedure there were no intraoperative complications.  Patient was sent to the [facility] in stable condition.      Plan will be for the patient get 24 hours of Ancef for infection prophylaxis and 30 days of aspirin for DVT prophylaxis.      Should be noted that my assistant was vital throughout the entire procedure for the safe transfer of the patient from the hospital bed to the operating table and back to the hospital bed is also used for retraction and closure of the wounds.     Bala Randall MD  [facility]

## 2025-07-29 NOTE — PROGRESS NOTES
Orthopedic Surgery  7/29/2025    S: Patient voices no complaints today. Events of yesterday noted.      O: Blood pressure 122/60, pulse 85, temperature 98.1  F (36.7  C), temperature source Oral, resp. rate 16, height 1.829 m (6'), weight (!) 167.4 kg (369 lb 0.8 oz), SpO2 98%.  Lab Results   Component Value Date    HGB 7.7 07/28/2025    HGB 13.4 10/26/2020     Lab Results   Component Value Date    INR 1.20 07/21/2025    INR 0.92 01/09/2017        Neurovascularly intact.  Calves are negative bilaterally, both soft and nontender.  The wound is draining serosangous fluid  The wound is dressed    A: Mr. Greenwood is doing well status post I&D with head and liner exchange, but has had increasing wound drainage over the last 24 hours.  Will plan to go to the OR for evacuation of hematoma and wound closure.    P: Continue physical therapy.   Anticoagulation was on lovenox, now held  ID: Continue daptomycin  Pain management  Discharge planning, may need TCU placement    Bala Randall MD  [phone]

## 2025-07-29 NOTE — PROGRESS NOTES
NS ADMISSION NOTE    Patient admitted to room 300 at approximately 2200 via cart from emergency room. Patient was accompanied by transport tech.     Verbal SBAR report received from ED handoff report prior to patient arrival.     Patient ambulated to bed with one assist. Patient alert and oriented X 3. The patient is not having any pain.  . Admission vital signs: Blood pressure (!) 144/58, pulse 81, temperature 98.5  F (36.9  C), temperature source Oral, resp. rate 16, height 1.829 m (6'), weight (!) 167.4 kg (369 lb 0.8 oz), SpO2 100%. Patient was oriented to plan of care, call light, bed controls, tv, telephone, bathroom, and visiting hours.     Risk Assessment    The following safety risks were identified during admission: fall, skin, and isolation. Yellow risk band applied: YES.     Skin Initial Assessment    This writer admitted this patient and completed a full skin assessment and Tim score in the Adult PCS flowsheet.   Photo documentation of skin problem and/or wound competed via Traansmission application (located under Media):  No    Appropriate interventions initiated as needed.     Secondary skin check completed by RN.    Tim Risk Assessment  Sensory Perception: 4-->no impairment  Moisture: 3-->occasionally moist  Activity: 3-->walks occasionally  Mobility: 3-->slightly limited  Nutrition: 3-->adequate  Friction and Shear: 2-->potential problem  Tim Score: 18    Education    Patient education completed and documented: Yes      Kassandra Walker RN

## 2025-07-29 NOTE — H&P
[facility]    History and Physical - Hospitalist Service       Date of Admission:  7/28/2025    Assessment & Plan      Kimo Greenwood is a 44 year old male admitted on 7/28/2025.     He is a morbidly obese male with a mild cognitive impairment who had right hip arthroplasty in April 2025 which was complicated by surgical site infection.  He had I&D and revision of had an acetabular liner and OR cultures grew MRSA.  He was just discharged earlier this afternoon with a right hip wound VAC and IV daptomycin at home.    When he reached home, his care manager noticed that the wound VAC was full of serosanguineous discharge.  The wound VAC was changed and he fell up 3 containers in quick succession and was therefore brought back to ER.  His care manager also had some concerns about his ability to take care of the wound VAC by himself.    He was otherwise afebrile and hemodynamically stable, rather high systolic blood pressure in 180s.  He was lying comfortably on the bed and eating a sandwich.    Had hemoglobin of 7.7 which is actually better than 7.65 days ago. BUN/creatinine of 11.3/1.4.    CT of the right hip showed marked interval decrease in the fluid and gas collection along the right hip surgical track.    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  -There increased serosanguineous drainage from the wound VAC is likely due to extensive bilateral lower extremity edema.  Hemoglobin is stable.  Check venous ultrasound bilateral lower extremities.  - Starting Lasix 40 mg IV twice daily as he is diuretic naïve and increase dose if needed.  Monitor BMP.  Elevate bilateral lower extremities.  - Continue daptomycin.  Consult ID and orthopedics.    CORINE:  - Appears to plateau around 1.4, monitor as patient is being started on diuretics.    Mild cognitive impairment:  - Lives independently at his  own apartment with close follow up visits from  who is concerned about his ability to manage wound VAC himself.  - Social work consult and possible TCU placement..    Essential hypertension:  - Continue prazosin.    Chronic medical conditions:  - Recent hip surgery.  On aspirin 325 twice daily for DVT prophylaxis.  - Morbid obesity.  On Ozempic at home, can be resumed on discharge.  - History of depression.  On Cymbalta and duloxetine  - Bronchial asthma.  Not in exacerbation.  - History of Patel's esophagus and GERD.  Continue home regimen of PPI.  - Overactive bladder.  On oxybutynin.  - History of gout.  Was recently treated with colchicine and steroid burst.  Resumed allopurinol.  - Sleep apnea.  Continue CPAP.  - Anemia.  Likely due to chronic disease.  Monitor hemoglobin as patient has increased serosanguineous discharge.      Disposition  - Inpatient.          Diet:  Regular diet  DVT Prophylaxis: Aspirin  Germain Catheter: Not present  Lines: PRESENT             Cardiac Monitoring: None  Code Status:  Full code    Clinically Significant Risk Factors Present on Admission                 # Drug Induced Platelet Defect: home medication list includes an antiplatelet medication   # Hypertension: Noted on problem list      # Anemia: based on hgb <11       # Morbid Obesity: Estimated body mass index is 44.76 kg/m  as calculated from the following:    Height as of this encounter: 1.829 m (6').    Weight as of this encounter: 149.7 kg (330 lb).       # Asthma: noted on problem list        Disposition Plan     Medically Ready for Discharge: Anticipated in 2-4 Days           Thai Thao MD  Hospitalist Service  [facility]  Securely message with PowerMag (more info)  Text page via [facility] Paging/Directory     ______________________________________________________________________    Chief Complaint   Increased serosanguineous discharge from the right hip wound VAC    History is obtained from  the patient and the  at bedside.    History of Present Illness   Kimo Greenwood is a 44 year old male admitted on 7/28/2025.     He is a morbidly obese male with a mild cognitive impairment who had right hip arthroplasty in April 2025 which was complicated by surgical site infection.  He had I&D and revision of had an acetabular liner and OR cultures grew MRSA.  He was just discharged earlier this afternoon with a right hip wound VAC and IV daptomycin at home.    When he reached home, his care manager noticed that the wound VAC was full of serosanguineous discharge.  The wound VAC was changed and he fell up 3 containers in quick succession and was therefore brought back to ER.  His care manager also had some concerns about his ability to take care of the wound VAC by himself.    He was otherwise afebrile and hemodynamically stable, rather high systolic blood pressure in 180s.  He was lying comfortably on the bed and eating a sandwich.    Had hemoglobin of 7.7 which is actually better than 7.65 days ago. BUN/creatinine of 11.3/1.4.    CT of the right hip showed marked interval decrease in the fluid and gas collection along the right hip surgical track.      Past Medical History    Past Medical History:   Diagnosis Date    Arthritis     DDD hips and knees    Asthma     Asthma     Patel's esophagus     Chronic pain     Chronic pain - left hip/leg pain     degenerative disc disease, back, hips, knees    Gastroesophageal reflux disease     History of anesthesia complications     agitation x1 after interstim 2013    Hypertension     Hypertension     Leukocytosis     LPRD (laryngopharyngeal reflux disease)     Marijuana abuse     Marijuana abuse     MDD (major depressive disorder)     MDD (major depressive disorder)     Mental retardation, mild (I.Q. 50-70)     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    With associated speech/language delay    Obesity 11/11/2010    LOREN (obstructive sleep apnea)      Uses a CPAP    LOREN (obstructive sleep apnea)     Seborrheic dermatitis     Seborrheic dermatitis     Sleep apnea     CPAP       Past Surgical History   Past Surgical History:   Procedure Laterality Date    ARTHROPLASTY HIP Left 01/25/2017    Procedure: ARTHROPLASTY HIP;  Surgeon: Bala Randall MD;  Location: [facility] OR    ARTHROPLASTY HIP Left     ARTHROPLASTY HIP Right 04/14/2025    Procedure: Right total hip arthroplasty;  Surgeon: Bala Randall MD;  Location: [facility] OR    ARTHROPLASTY REVISION HIP Left 06/24/2019    Procedure: Revision left total hip arthroplasty with a head and liner exchange to a Biomet dual mobility head and liner.;  Surgeon: Bala Randall MD;  Location: [facility] OR    BACK SURGERY      BLADDER SURGERY      Ibarra, [facility],Interstem stimulator implant    CLOSED REDUCTION HIP Left 06/10/2019    Procedure: Closed reduction under general anesthesia left recurrent prosthetic hip dislocation;  Surgeon: Rafat Cazares MD;  Location:  OR    COLONOSCOPY N/A 10/30/2015    Procedure: COMBINED COLONOSCOPY, SINGLE OR MULTIPLE BIOPSY/POLYPECTOMY BY BIOPSY;  Surgeon: Alexis Jackson MD;  Location:  GI    COLONOSCOPY N/A 11/17/2016    Procedure: COMBINED COLONOSCOPY, SINGLE OR MULTIPLE BIOPSY/POLYPECTOMY BY BIOPSY;  Surgeon: Cat Huber MD;  Location:  GI    COLONOSCOPY N/A 10/28/2020    Procedure: COLONOSCOPY;  Surgeon: You Kraus MD;  Location:  OR    COLONOSCOPY      ESOPHAGOSCOPY, GASTROSCOPY, DUODENOSCOPY (EGD), COMBINED N/A 11/17/2016    Procedure: COMBINED ESOPHAGOSCOPY, GASTROSCOPY, DUODENOSCOPY (EGD), BIOPSY SINGLE OR MULTIPLE;  Surgeon: Cat Huber MD;  Location:  GI    ESOPHAGOSCOPY, GASTROSCOPY, DUODENOSCOPY (EGD), COMBINED N/A 03/12/2020    Procedure: ESOPHAGOGASTRODUODENOSCOPY WITH BIOPSIES;  Surgeon: You Kraus MD;  Location:  OR    ESOPHAGOSCOPY, GASTROSCOPY, DUODENOSCOPY (EGD), COMBINED N/A  "10/28/2020    Procedure: ESOPHAGOGASTRODUODENOSCOPY, WITH BIOPSY;  Surgeon: You Kraus MD;  Location: [facility] OR    ESOPHAGOSCOPY, GASTROSCOPY, DUODENOSCOPY (EGD), COMBINED      ESOPHAGOSCOPY, GASTROSCOPY, DUODENOSCOPY (EGD), COMBINED N/A 11/29/2023    Procedure: Esophagoscopy, gastroscopy, duodenoscopy (EGD), combined with biopsies using forceps;  Surgeon: You Kraus MD;  Location: [facility] GI    EXAM UNDER ANESTHESIA, FULGURATE CONDYLOMA ANUS, COMBINED N/A 12/22/2016    Procedure: COMBINED EXAM UNDER ANESTHESIA, FULGURATE CONDYLOMA ANUS;  Surgeon: Nya Cabello MD;  Location: [facility] OR    GENITOURINARY SURGERY      INCISION AND DRAINAGE HIP, COMBINED Right 7/21/2025    Procedure: Incision and drainage hip, combined, with revision of head and acetabular liner;  Surgeon: Bala Randall MD;  Location:  OR    JOINT REPLACEMENT Left 01/01/2017    MARGO, Revision Left MARGO    OTHER SURGICAL HISTORY      interstim stimulator implant    CA CYSTOURETHROSCOPY INJ CHEMODENERVATION BLADDER N/A 01/16/2019    Procedure: CYSTOSCOPY BOTOX BLADDER INJECTIONS (100 UNITS IN 10CC);  Surgeon: Didier Ibarra MD;  Location: [facility];  Service: Urology    CA IMPLANT PERIPH/GASTRIC NEUROSTIM/ N/A 01/08/2020    Procedure: NEW INTERSTIM LEAD, REPLACE OLD; NEW INTERSTIM BATTERY, REPLACE OLD;  Surgeon: Didier Ibarra MD;  Location: [facility];  Service: Urology       Prior to Admission Medications   Prior to Admission Medications   Prescriptions Last Dose Informant Patient Reported? Taking?   DAPTOmycin (CUBICIN) in NS syringe   No No   Sig: Inject 13 mLs (650 mg) over 5-10 minutes into the vein via push every 24 hours.   DULoxetine (CYMBALTA) 60 MG capsule   Yes No   Sig: Take 120 mg by mouth daily    Emergency Supply Kit, Central,   No No   Sig: Patient use for emergency only. Contents: 3 sodium chloride 0.9% flushes, 1 dressing kit, 1 microclave ext set 14\", 4 nitrile gloves (med), 6 alcohol " "prep pads, 1 bacitracin, 1 syringe (10 cc 20 G 1\"). Call [phone] to reorder.   OLANZapine (ZYPREXA) 15 MG tablet   Yes No   Sig: Take 15 mg by mouth at bedtime.   acetaminophen (TYLENOL) 325 MG tablet   No No   Sig: Take 2 tablets (650 mg) by mouth every 6 hours.   allopurinol (ZYLOPRIM) 300 MG tablet   No No   Sig: Take 1 tablet (300 mg) by mouth daily.   aspirin (ASA) 325 MG EC tablet   No No   Sig: Take one Aspirin tab twice daily for 5 weeks.   calcium polycarbophil (FIBER-LAX) 625 MG tablet   No No   Sig: Take 1 tablet (625 mg) by mouth daily.   cholecalciferol (VITAMIN D3) 10 mcg (400 units) TABS tablet  Self Yes No   Sig: Take 10 mcg by mouth daily.   ferrous sulfate (FEROSUL) 325 (65 Fe) MG tablet   No No   Sig: TAKE 1 TABLET BY MOUTH DAILY WITH BREAKFAST   hydrOXYzine HCl (ATARAX) 25 MG tablet   No No   Sig: Take 1-2 tablets (25-50 mg) by mouth every 6 hours as needed for other (muscle spasms).   hydrocortisone 2.5 % cream  Self Yes No   Sig: Apply topically 2 times daily. Apply a thin layer to face once daily for up to 2 weeks, decrease use as rash improves, repeat as needed for flares   ketoconazole (NIZORAL) 2 % external cream  Self Yes No   Sig: Apply topically 2 times daily. Apply to rash on face twice daily until improved for seborrheic dermatitis   ketoconazole (NIZORAL) 2 % external shampoo  Self Yes No   Sig: Apply topically daily as needed for itching or irritation.   loperamide (IMODIUM) 2 MG capsule  Self Yes No   Sig: Take 2 mg by mouth 4 times daily as needed for diarrhea.   magnesium 250 MG tablet  Self Yes No   Sig: Take 1 tablet by mouth daily.   mirabegron (MYRBETRIQ) 50 MG 24 hr tablet   Yes No   Sig: Take 1 tablet by mouth daily   oxyBUTYnin ER (DITROPAN XL) 15 MG 24 hr tablet   No No   Sig: Take 2 tablets (30 mg) by mouth daily   potassium gluconate 2.5 MEQ tablet  Self Yes No   Sig: Take 2.5 mEq by mouth daily.   prazosin (MINIPRESS) 1 MG capsule   Yes No   Sig: Take 3 mg by " mouth At Bedtime   rifampin (RIFADIN) 300 MG capsule   No No   Sig: Take 1 capsule (300 mg) by mouth every 12 hours.   senna-docusate (SENOKOT-S/PERICOLACE) 8.6-50 MG tablet   No No   Sig: Take 1 tablet by mouth 2 times daily.   sodium chloride, PF, 0.9% PF flush   No No   Sig: Inject 10 mLs into the vein as needed for line flush. Flush IV before and after medication administration as directed and/or at least every 24 hours.   tirzepatide-Weight Management (ZEPBOUND) 12.5 MG/0.5ML prefilled pen   No No   Sig: Inject 0.5 mLs (12.5 mg) subcutaneously every 7 days.      Facility-Administered Medications: None        Review of Systems    The 10 point Review of Systems is negative other than noted in the HPI or here.      Physical Exam   Vital Signs: Temp: 98.3  F (36.8  C) Temp src: Oral BP: (!) 186/100 Pulse: 81   Resp: 11 SpO2: 100 % O2 Device: None (Room air)    Weight: 330 lbs 0 oz    General Appearance: Morbidly obese male who does not appear to be in any distress  Respiratory: Clear to auscultation  Cardiovascular: S1-S2 normal  GI: Soft and nontender  Skin: No rash  Other: Bilateral symmetric lower extremity edema.  Right hip has wound VAC with serosanguineous discharge.      Medical Decision Making       90 MINUTES SPENT BY ME on the date of service doing chart review, history, exam, documentation & further activities per the note.      Data     I have personally reviewed the following data over the past 24 hrs:    10.9  \   7.7 (L)   / 396     140 105 11.3 /  122 (H)   3.7 26 1.40 (H) \       Imaging results reviewed over the past 24 hrs:   Recent Results (from the past 24 hours)   CT Hip Right w Contrast    Narrative    EXAM: CT HIP RIGHT W CONTRAST  LOCATION: [facility]  DATE: 7/28/2025    INDICATION: Right hip surgery; pain and persistent bleeding.  COMPARISON: 07/19/2025  TECHNIQUE: Without and with IV contrast. Axial, sagittal and coronal thin-section reconstruction. Dose reduction  techniques were used.   CONTRAST: 100 mL Isovue 370    FINDINGS:     BONES:  Status post right total hip arthroplasty. Components are in normal alignment. Metallic components create streak/beam-hardening artifact limiting evaluation of adjacent structures. No CT evidence of periprosthetic fracture. Unchanged cystic-appearing   lucent lesion within the right acetabulum which was also seen in 2023. Partial ankylosis of the right sacroiliac joint. Degenerative changes of the lower lumbar spine and pubic symphysis. Osseous demineralization.    SOFT TISSUES:  Since 07/19/2025, marked interval decrease in size of the fluid and gas collection along the right hip surgical tract which is likely secondary to interval intervention. Residual area of gas, fluid, and stranding/scarring measures approximately 3.8 x   10.0 x 16.5 cm (AP x ML x CC). Portions of the fluid contacts the right hip arthroplasty and right greater trochanter.    Multiple enlarged right inguinal and right pelvic lymph nodes which are likely reactive.    Right sacral neurostimulator. Focal fat within the right iliopsoas muscle consistent with sequela of prior injury/strain.      Impression    IMPRESSION:  1.  Marked interval decrease in size of the fluid and gas collection along the right hip surgical tract which is likely secondary to interval intervention.  2.  Multiple enlarged right inguinal and right pelvic lymph nodes which are likely reactive.

## 2025-07-29 NOTE — PLAN OF CARE
"Shift: 4044-3738  A&Ox4   VSS on RA  Takes pills whole with water   Complains of pain in the right hip before I&D, did not want medication. Denies pain after I&D  Activity: SBA  Elimination: urinal  Drains: wound vac  Protocols: Mg, K+ recheck in am  Consults: ID, ortho surg, cm/sw        Goal Outcome Evaluation:      Plan of Care Reviewed With: patient, guardian    Overall Patient Progress: improvingOverall Patient Progress: improving    Outcome Evaluation: I&D and evacuation of right hip, new wound vac placed          Problem: Adult Inpatient Plan of Care  Goal: Plan of Care Review  Description: The Plan of Care Review/Shift note should be completed every shift.  The Outcome Evaluation is a brief statement about your assessment that the patient is improving, declining, or no change.  This information will be displayed automatically on your shift  note.  Outcome: Progressing  Flowsheets (Taken 7/29/2025 4244)  Outcome Evaluation: I&D and evacuation of right hip, new wound vac placed  Plan of Care Reviewed With:   patient   guardian  Overall Patient Progress: improving  Goal: Patient-Specific Goal (Individualized)  Description: You can add care plan individualizations to a care plan. Examples of Individualization might be:  \"Parent requests to be called daily at 9am for status\", \"I have a hard time hearing out of my right ear\", or \"Do not touch me to wake me up as it startles  me\".  Outcome: Progressing  Goal: Absence of Hospital-Acquired Illness or Injury  Outcome: Progressing  Intervention: Identify and Manage Fall Risk  Recent Flowsheet Documentation  Taken 7/29/2025 1130 by Elis Espinoza RN  Safety Promotion/Fall Prevention:   activity supervised   assistive device/personal items within reach   clutter free environment maintained   increased rounding and observation   increase visualization of patient   lighting adjusted   nonskid shoes/slippers when out of bed   patient and family education   room door " open   room near nurse's station   room organization consistent   safety round/check completed   treat underlying cause  Goal: Optimal Comfort and Wellbeing  Outcome: Progressing  Intervention: Monitor Pain and Promote Comfort  Recent Flowsheet Documentation  Taken 7/29/2025 1130 by Elis Espinoza RN  Pain Management Interventions: medication offered but refused  Goal: Readiness for Transition of Care  Outcome: Progressing     Problem: Infection  Goal: Absence of Infection Signs and Symptoms  Outcome: Progressing

## 2025-07-29 NOTE — PROGRESS NOTES
Pt placed on hospital CPAP for overnight use. Skin is good/intact. RT will monitor.    Danna Lamb RT

## 2025-07-29 NOTE — PROGRESS NOTES
Readmitted, continue daptomycin 1000 mg daily (purposely high dose), creatinine still remains elevated, getting another I&D currently await those findings

## 2025-07-29 NOTE — ANESTHESIA CARE TRANSFER NOTE
Patient: Kimo Greenwood    Procedure: Procedure(s):  Irrigation and debridement, evacuation hematoma hip       Diagnosis: S/P total right hip arthroplasty [Z96.641]  Abscess of right hip [L02.415]  Diagnosis Additional Information: No value filed.    Anesthesia Type:   General     Note:    Oropharynx: oropharynx clear of all foreign objects and spontaneously breathing  Level of Consciousness: drowsy  Oxygen Supplementation: face mask  Level of Supplemental Oxygen (L/min / FiO2): 5  Independent Airway: airway patency satisfactory and stable  Dentition: dentition unchanged  Vital Signs Stable: post-procedure vital signs reviewed and stable  Report to RN Given: handoff report given  Patient transferred to: PACU    Handoff Report: Identifed the Patient, Identified the Reponsible Provider, Reviewed the pertinent medical history, Discussed the surgical course, Reviewed Intra-OP anesthesia mangement and issues during anesthesia, Set expectations for post-procedure period and Allowed opportunity for questions and acknowledgement of understanding      Vitals:  Vitals Value Taken Time   BP     Temp     Pulse 97 07/29/25 13:40   Resp 8 07/29/25 13:40   SpO2 100 % 07/29/25 13:40   Vitals shown include unfiled device data.    Electronically Signed By: Dean Dennis Severson, APRN CRNA  July 29, 2025  1:41 PM

## 2025-07-30 ENCOUNTER — APPOINTMENT (OUTPATIENT)
Dept: PHYSICAL THERAPY | Facility: CLINIC | Age: 45
DRG: 857 | End: 2025-07-30
Attending: ORTHOPAEDIC SURGERY
Payer: COMMERCIAL

## 2025-07-30 ENCOUNTER — APPOINTMENT (OUTPATIENT)
Dept: OCCUPATIONAL THERAPY | Facility: CLINIC | Age: 45
DRG: 857 | End: 2025-07-30
Attending: ORTHOPAEDIC SURGERY
Payer: COMMERCIAL

## 2025-07-30 LAB
ANION GAP SERPL CALCULATED.3IONS-SCNC: 10 MMOL/L (ref 7–15)
BUN SERPL-MCNC: 10.4 MG/DL (ref 6–20)
CALCIUM SERPL-MCNC: 8.7 MG/DL (ref 8.8–10.4)
CHLORIDE SERPL-SCNC: 102 MMOL/L (ref 98–107)
CREAT SERPL-MCNC: 1.48 MG/DL (ref 0.67–1.17)
EGFRCR SERPLBLD CKD-EPI 2021: 60 ML/MIN/1.73M2
GLUCOSE BLDC GLUCOMTR-MCNC: 90 MG/DL (ref 70–99)
GLUCOSE SERPL-MCNC: 87 MG/DL (ref 70–99)
HCO3 SERPL-SCNC: 28 MMOL/L (ref 22–29)
HGB BLD-MCNC: 7.1 G/DL (ref 13.3–17.7)
MAGNESIUM SERPL-MCNC: 1.7 MG/DL (ref 1.7–2.3)
MCV RBC AUTO: 86 FL (ref 78–100)
POTASSIUM SERPL-SCNC: 3.6 MMOL/L (ref 3.4–5.3)
POTASSIUM SERPL-SCNC: 3.8 MMOL/L (ref 3.4–5.3)
SODIUM SERPL-SCNC: 140 MMOL/L (ref 135–145)

## 2025-07-30 PROCEDURE — 99232 SBSQ HOSP IP/OBS MODERATE 35: CPT | Performed by: INTERNAL MEDICINE

## 2025-07-30 PROCEDURE — 94660 CPAP INITIATION&MGMT: CPT

## 2025-07-30 PROCEDURE — 97116 GAIT TRAINING THERAPY: CPT | Mod: GP | Performed by: PHYSICAL THERAPIST

## 2025-07-30 PROCEDURE — 97161 PT EVAL LOW COMPLEX 20 MIN: CPT | Mod: GP | Performed by: PHYSICAL THERAPIST

## 2025-07-30 PROCEDURE — 250N000011 HC RX IP 250 OP 636: Performed by: HOSPITALIST

## 2025-07-30 PROCEDURE — 250N000013 HC RX MED GY IP 250 OP 250 PS 637: Performed by: HOSPITALIST

## 2025-07-30 PROCEDURE — 250N000013 HC RX MED GY IP 250 OP 250 PS 637: Performed by: ORTHOPAEDIC SURGERY

## 2025-07-30 PROCEDURE — 99232 SBSQ HOSP IP/OBS MODERATE 35: CPT | Performed by: HOSPITALIST

## 2025-07-30 PROCEDURE — 97535 SELF CARE MNGMENT TRAINING: CPT | Mod: GO

## 2025-07-30 PROCEDURE — 250N000011 HC RX IP 250 OP 636: Performed by: STUDENT IN AN ORGANIZED HEALTH CARE EDUCATION/TRAINING PROGRAM

## 2025-07-30 PROCEDURE — 999N000157 HC STATISTIC RCP TIME EA 10 MIN

## 2025-07-30 PROCEDURE — 120N000001 HC R&B MED SURG/OB

## 2025-07-30 PROCEDURE — 250N000011 HC RX IP 250 OP 636: Performed by: ORTHOPAEDIC SURGERY

## 2025-07-30 PROCEDURE — 84132 ASSAY OF SERUM POTASSIUM: CPT | Performed by: ORTHOPAEDIC SURGERY

## 2025-07-30 PROCEDURE — 97165 OT EVAL LOW COMPLEX 30 MIN: CPT | Mod: GO

## 2025-07-30 PROCEDURE — 85018 HEMOGLOBIN: CPT | Performed by: ORTHOPAEDIC SURGERY

## 2025-07-30 PROCEDURE — 84132 ASSAY OF SERUM POTASSIUM: CPT | Performed by: HOSPITALIST

## 2025-07-30 PROCEDURE — 83735 ASSAY OF MAGNESIUM: CPT | Performed by: ORTHOPAEDIC SURGERY

## 2025-07-30 PROCEDURE — 80048 BASIC METABOLIC PNL TOTAL CA: CPT | Performed by: ORTHOPAEDIC SURGERY

## 2025-07-30 PROCEDURE — 258N000003 HC RX IP 258 OP 636: Performed by: STUDENT IN AN ORGANIZED HEALTH CARE EDUCATION/TRAINING PROGRAM

## 2025-07-30 RX ORDER — HYDROMORPHONE HYDROCHLORIDE 1 MG/ML
0.3 INJECTION, SOLUTION INTRAMUSCULAR; INTRAVENOUS; SUBCUTANEOUS
Refills: 0 | Status: DISPENSED | OUTPATIENT
Start: 2025-07-30

## 2025-07-30 RX ORDER — HYDROMORPHONE HYDROCHLORIDE 2 MG/1
2 TABLET ORAL
Refills: 0 | Status: DISPENSED | OUTPATIENT
Start: 2025-07-30

## 2025-07-30 RX ADMIN — PRAZOSIN HYDROCHLORIDE 3 MG: 1 CAPSULE ORAL at 21:14

## 2025-07-30 RX ADMIN — ACETAMINOPHEN 975 MG: 325 TABLET ORAL at 09:40

## 2025-07-30 RX ADMIN — RIFAMPIN 300 MG: 300 CAPSULE ORAL at 21:14

## 2025-07-30 RX ADMIN — HYDROMORPHONE HYDROCHLORIDE 0.3 MG: 1 INJECTION, SOLUTION INTRAMUSCULAR; INTRAVENOUS; SUBCUTANEOUS at 18:23

## 2025-07-30 RX ADMIN — DULOXETINE 120 MG: 60 CAPSULE, DELAYED RELEASE ORAL at 09:41

## 2025-07-30 RX ADMIN — CEFAZOLIN SODIUM 2 G: 2 SOLUTION INTRAVENOUS at 04:11

## 2025-07-30 RX ADMIN — FUROSEMIDE 40 MG: 10 INJECTION, SOLUTION INTRAMUSCULAR; INTRAVENOUS at 09:43

## 2025-07-30 RX ADMIN — OXYBUTYNIN CHLORIDE 30 MG: 5 TABLET, EXTENDED RELEASE ORAL at 09:41

## 2025-07-30 RX ADMIN — FUROSEMIDE 40 MG: 10 INJECTION, SOLUTION INTRAMUSCULAR; INTRAVENOUS at 15:59

## 2025-07-30 RX ADMIN — HYDROMORPHONE HYDROCHLORIDE 0.3 MG: 1 INJECTION, SOLUTION INTRAMUSCULAR; INTRAVENOUS; SUBCUTANEOUS at 12:52

## 2025-07-30 RX ADMIN — FERROUS SULFATE TAB 325 MG (65 MG ELEMENTAL FE) 325 MG: 325 (65 FE) TAB at 09:41

## 2025-07-30 RX ADMIN — DOXYCYCLINE HYCLATE 50 MG: 50 CAPSULE ORAL at 21:15

## 2025-07-30 RX ADMIN — MIRABEGRON 50 MG: 50 TABLET, EXTENDED RELEASE ORAL at 09:43

## 2025-07-30 RX ADMIN — FAMOTIDINE 20 MG: 20 TABLET, FILM COATED ORAL at 09:41

## 2025-07-30 RX ADMIN — SENNOSIDES AND DOCUSATE SODIUM 1 TABLET: 50; 8.6 TABLET ORAL at 09:41

## 2025-07-30 RX ADMIN — RIFAMPIN 300 MG: 300 CAPSULE ORAL at 09:41

## 2025-07-30 RX ADMIN — DAPTOMYCIN 1000 MG: 500 INJECTION, POWDER, LYOPHILIZED, FOR SOLUTION INTRAVENOUS at 21:15

## 2025-07-30 RX ADMIN — ASPIRIN 325 MG: 325 TABLET, COATED ORAL at 21:14

## 2025-07-30 RX ADMIN — DOXYCYCLINE HYCLATE 50 MG: 50 CAPSULE ORAL at 09:43

## 2025-07-30 RX ADMIN — ASPIRIN 325 MG: 325 TABLET, COATED ORAL at 09:41

## 2025-07-30 RX ADMIN — HYDROXYZINE HYDROCHLORIDE 25 MG: 25 TABLET, FILM COATED ORAL at 12:52

## 2025-07-30 RX ADMIN — HYDROMORPHONE HYDROCHLORIDE 0.4 MG: 0.2 INJECTION, SOLUTION INTRAMUSCULAR; INTRAVENOUS; SUBCUTANEOUS at 09:48

## 2025-07-30 RX ADMIN — ACETAMINOPHEN 975 MG: 325 TABLET ORAL at 15:59

## 2025-07-30 RX ADMIN — Medication 200 MG: at 09:41

## 2025-07-30 RX ADMIN — OLANZAPINE 15 MG: 10 TABLET, FILM COATED ORAL at 21:14

## 2025-07-30 RX ADMIN — ALLOPURINOL 300 MG: 300 TABLET ORAL at 09:41

## 2025-07-30 RX ADMIN — HYDROMORPHONE HYDROCHLORIDE 2 MG: 2 TABLET ORAL at 15:59

## 2025-07-30 RX ADMIN — HYDROMORPHONE HYDROCHLORIDE 2 MG: 2 TABLET ORAL at 21:14

## 2025-07-30 RX ADMIN — FAMOTIDINE 20 MG: 20 TABLET, FILM COATED ORAL at 21:14

## 2025-07-30 RX ADMIN — HYDROMORPHONE HYDROCHLORIDE 2 MG: 2 TABLET ORAL at 11:36

## 2025-07-30 ASSESSMENT — ACTIVITIES OF DAILY LIVING (ADL)
ADLS_ACUITY_SCORE: 42
ADLS_ACUITY_SCORE: 39
ADLS_ACUITY_SCORE: 39
ADLS_ACUITY_SCORE: 42
ADLS_ACUITY_SCORE: 39
ADLS_ACUITY_SCORE: 42
ADLS_ACUITY_SCORE: 39
ADLS_ACUITY_SCORE: 42
ADLS_ACUITY_SCORE: 39
ADLS_ACUITY_SCORE: 42
ADLS_ACUITY_SCORE: 39
ADLS_ACUITY_SCORE: 39
DEPENDENT_IADLS:: CLEANING;COOKING;LAUNDRY;SHOPPING;MEAL PREPARATION;MEDICATION MANAGEMENT;MONEY MANAGEMENT;TRANSPORTATION
ADLS_ACUITY_SCORE: 42
ADLS_ACUITY_SCORE: 42

## 2025-07-30 NOTE — CONSULTS
Care Management Initial Consult    General Information  Assessment completed with: Parents, Patient,    Type of CM/SW Visit: Initial Assessment    Primary Care Provider verified and updated as needed:     Readmission within the last 30 days:        Reason for Consult: discharge planning    Communication Assessment  Patient's communication style: spoken language (English or Bilingual)    Hearing Difficulty or Deaf: no   Wear Glasses or Blind: no    Cognitive  Cognitive/Neuro/Behavioral: WDL  Level of Consciousness: alert  Arousal Level: arouses to voice  Orientation: oriented x 4  Mood/Behavior: flat affect  Best Language: 0 - No aphasia  Speech: clear, spontaneous    Living Environment:   People in home: alone     Current living Arrangements: apartment      Able to return to prior arrangements:  (Will need TCU before returning home)     Family/Social Support:  Care provided by: self, other (see comments) (Care Coordinator/PCA)  Provides care for: no one, unable/limited ability to care for self     Support system: Parent(s) (Care Coordinator/PCA)          Description of Support System: Supportive, Involved       Current Resources:     Community Resources: [facility] Programs, Home Care,  Mental Health (SNAP/EBT, food shelves, individualized in-home support through Developmental Disabilities Waiver (coordination of care, medications, day-to-day independent living support))  Equipment currently used at home: cane, straight, walker, rolling, grab bar, toilet, grab bar, tub/shower, shower chair, dressing device    Does the patient's insurance plan have a 3 day qualifying hospital stay waiver?  Yes     Which insurance plan 3 day waiver is available? Alternative insurance waiver    Will the waiver be used for post-acute placement? No    Lifestyle & Psychosocial Needs:  Social Drivers of Health     Food Insecurity: Low Risk  (7/29/2025)    Food Insecurity     Within the past 12 months, did you worry that your food would run  out before you got money to buy more?: No     Within the past 12 months, did the food you bought just not last and you didn t have money to get more?: No   Depression: Not at risk (7/11/2025)    PHQ-2     PHQ-2 Score: 1   Recent Concern: Depression - At risk (6/5/2025)    PHQ-2     PHQ-2 Score: 4   Housing Stability: Low Risk  (7/29/2025)    Housing Stability     Do you have housing? : Yes     Are you worried about losing your housing?: No   Tobacco Use: Medium Risk (7/29/2025)    Patient History     Smoking Tobacco Use: Former     Smokeless Tobacco Use: Former     Passive Exposure: Current   Financial Resource Strain: Low Risk  (7/29/2025)    Financial Resource Strain     Within the past 12 months, have you or your family members you live with been unable to get utilities (heat, electricity) when it was really needed?: No   Alcohol Use: Not on file   Transportation Needs: Low Risk  (7/29/2025)    Transportation Needs     Within the past 12 months, has lack of transportation kept you from medical appointments, getting your medicines, non-medical meetings or appointments, work, or from getting things that you need?: No   Physical Activity: Not on file   Interpersonal Safety: Low Risk  (7/29/2025)    Interpersonal Safety     Do you feel physically and emotionally safe where you currently live?: Yes     Within the past 12 months, have you been hit, slapped, kicked or otherwise physically hurt by someone?: No     Within the past 12 months, have you been humiliated or emotionally abused in other ways by your partner or ex-partner?: No   Stress: Not on file   Social Connections: Not on file   Health Literacy: Not on file       Functional Status:  Prior to admission patient needed assistance:   Dependent ADLs:: Ambulation-walker, Dressing  Dependent IADLs:: Cleaning, Cooking, Laundry, Shopping, Meal Preparation, Medication Management, Money Management, Transportation     Discussed  Partnership in Safe Discharge Planning   document with patient/family: No    Additional Information:  CM/SW consulted for discharge planning/elevated unplanned readmission risk score of 38%. 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  Met with pt at bedside and mother/guardian was included in the conversation via phone.   Pt lives alone in an apartment with 4 hours of PCA/care coordinator visits daily.   Pt discharged home earlier in the week with plans for IV antibiotics to be administered in the home. Pt readmitted within hours due to increased drainage from his wound vac.   Pt has a walker and cane at home, also a medication dispensing machine. Pt states he has not been requiring  his cane or walker recently. Pt receives assist with IADLs. Pt has a DD waiver.   Mother is hoping for pt to discharge to a TCU. Informed will wait for therapy recommendations and proceed with sending in referrals to [facility] [place] and [facility].  Mother is requesting wheelchair transportation be arranged at discharge.     Next Steps: Follow up on therapy recommendations and submit TCU referrals, if appropriate.    ADDENDUM: Therapy assessments have been done and referrals sent to TCU.    Bonny Herrera RN   Inpatient Care Coordination  [facility]   Phone: [phone]

## 2025-07-30 NOTE — PLAN OF CARE
"Orientation: AAOx4  Pain: pain is managed with PRN pain medication  O2: capno, CPAP at night  GI/: voiding adequately via urinal  Skin: negative pressure wound vac in placed  Activity: Stand by assist.  Diet: regular diet  Protocols: K, Mg  Major shift events:  Plan: continue with POC  Problem: Adult Inpatient Plan of Care  Goal: Plan of Care Review  Description: The Plan of Care Review/Shift note should be completed every shift.  The Outcome Evaluation is a brief statement about your assessment that the patient is improving, declining, or no change.  This information will be displayed automatically on your shift  note.  Outcome: Progressing  Flowsheets (Taken 7/30/2025 0330)  Plan of Care Reviewed With: patient  Overall Patient Progress: improving  Goal: Patient-Specific Goal (Individualized)  Description: You can add care plan individualizations to a care plan. Examples of Individualization might be:  \"Parent requests to be called daily at 9am for status\", \"I have a hard time hearing out of my right ear\", or \"Do not touch me to wake me up as it startles  me\".  Outcome: Progressing  Goal: Absence of Hospital-Acquired Illness or Injury  Outcome: Progressing  Intervention: Identify and Manage Fall Risk  Recent Flowsheet Documentation  Taken 7/29/2025 2005 by Braeden Contreras RN  Safety Promotion/Fall Prevention: safety round/check completed  Intervention: Prevent Skin Injury  Recent Flowsheet Documentation  Taken 7/29/2025 2005 by Braeden Contreras RN  Body Position: position changed independently  Intervention: Prevent and Manage VTE (Venous Thromboembolism) Risk  Recent Flowsheet Documentation  Taken 7/29/2025 2005 by Braeden Contreras RN  VTE Prevention/Management: SCDs off (sequential compression devices)  Intervention: Prevent Infection  Recent Flowsheet Documentation  Taken 7/29/2025 2005 by Braeden Contreras RN  Infection Prevention:   hand hygiene promoted   personal protective equipment utilized   single patient " room provided  Goal: Optimal Comfort and Wellbeing  Outcome: Progressing  Intervention: Monitor Pain and Promote Comfort  Recent Flowsheet Documentation  Taken 7/29/2025 2023 by Braeden Contreras RN  Pain Management Interventions: medication (see MAR)  Goal: Readiness for Transition of Care  Outcome: Progressing     Problem: Infection  Goal: Absence of Infection Signs and Symptoms  Outcome: Progressing  Intervention: Prevent or Manage Infection  Recent Flowsheet Documentation  Taken 7/29/2025 2005 by Braeden Contreras RN  Isolation Precautions: contact precautions maintained     Problem: Pain Acute  Goal: Optimal Pain Control and Function  Outcome: Progressing  Intervention: Develop Pain Management Plan  Recent Flowsheet Documentation  Taken 7/29/2025 2023 by Braeden Contreras RN  Pain Management Interventions: medication (see MAR)  Intervention: Prevent or Manage Pain  Recent Flowsheet Documentation  Taken 7/29/2025 2005 by Braeden Contreras RN  Medication Review/Management: medications reviewed   Goal Outcome Evaluation:      Plan of Care Reviewed With: patient    Overall Patient Progress: improvingOverall Patient Progress: improving

## 2025-07-30 NOTE — PROGRESS NOTES
07/30/25 1400   Appointment Info   Signing Clinician's Name / Credentials (PT) Nya Green DPT   Living Environment   People in Home alone   Current Living Arrangements apartment   Home Accessibility stairs to enter home   Number of Stairs, Main Entrance 3   Stair Railings, Main Entrance railings on both sides of stairs   Transportation Anticipated family or friend will provide   Self-Care   Usual Activity Tolerance good   Current Activity Tolerance moderate   Equipment Currently Used at Home cane, straight;walker, rolling;grab bar, toilet;grab bar, tub/shower   Fall history within last six months yes   Number of times patient has fallen within last six months 2   General Information   Onset of Illness/Injury or Date of Surgery 07/28/25   Referring Physician Bala Randall MD   Patient/Family Therapy Goals Statement (PT) Open to TCU   Pertinent History of Current Problem (include personal factors and/or comorbidities that impact the POC) Kimo Greenwood is a 44 year old male admitted on 7/28/2025.  He is a morbidly obese male with a mild cognitive impairment who had right hip arthroplasty in April 2025 which was complicated by surgical site infection.  He had I&D and revision of had an acetabular liner and OR cultures grew MRSA.  He was discharged 7/28 with a right hip wound VAC and IV daptomycin at home.     When he reached home, his care manager noticed that the wound VAC was full of serosanguineous discharge.  The wound VAC was changed and he fell up 3 containers in quick succession and was therefore brought back to ER.  His care manager also had some concerns about his ability to take care of the wound VAC by himself.     He was otherwise afebrile and hemodynamically stable, rather high systolic blood pressure in 180s.  He was lying comfortably on the bed and eating a sandwich.  Had hemoglobin of 7.7 which is actually better than 7.65 days ago. BUN/creatinine of 11.3/1.4.  CT of the right hip  showed marked interval decrease in the fluid and gas collection along the right hip surgical track.     He was seen by orthopedics and plan is for evacuation of hematoma and wound closure in the OR.  ID has also been consulted.  Will continue to work with PT/OT and at this point the plan is for discharge to TCU.   Weight-Bearing Status - RLE weight-bearing as tolerated   Cognition   Affect/Mental Status (Cognition) flat/blunted affect   Orientation Status (Cognition) oriented x 4   Follows Commands (Cognition) WFL   Pain Assessment   Patient Currently in Pain   (pain in R hip, MILD)   Range of Motion (ROM)   Range of Motion ROM deficits secondary to surgical procedure   Strength (Manual Muscle Testing)   Strength (Manual Muscle Testing) Deficits observed during functional mobility   Bed Mobility   Comment, (Bed Mobility) Increased effort, heavy use of rail, SBA   Transfers   Comment, (Transfers) CGA, bed elevated   Gait/Stairs (Locomotion)   Comment, (Gait/Stairs) SBA, limited foot clearance and increased angle of progression   Balance   Balance Comments B UE on walker   Clinical Impression   Criteria for Skilled Therapeutic Intervention Yes, treatment indicated   PT Diagnosis (PT) decreased functional mobility   Influenced by the following impairments decreased ROM, decreased strength, pain   Functional limitations due to impairments decreased bed mob, transfers, ambulation, stairs   Clinical Presentation (PT Evaluation Complexity) stable   Clinical Presentation Rationale clinical judgement   Clinical Decision Making (Complexity) low complexity   Planned Therapy Interventions (PT) balance training;gait training;home exercise program;neuromuscular re-education;patient/family education;ROM (range of motion);stair training;strengthening;stretching;cryotherapy;transfer training;progressive activity/exercise;risk factor education;home program guidelines   Risk & Benefits of therapy have been explained  evaluation/treatment results reviewed;risks/benefits reviewed;care plan/treatment goals reviewed;participants voiced agreement with care plan;current/potential barriers reviewed;participants included;patient   PT Total Evaluation Time   PT Eval, Low Complexity Minutes (95091) 5   Physical Therapy Goals   PT Frequency Daily   PT Predicted Duration/Target Date for Goal Attainment 07/25/25   PT Goals Transfers;Gait;Stairs;Bed Mobility   PT: Bed Mobility Independent;Supine to/from sit   PT: Transfers Modified independent;Sit to/from stand;Assistive device   PT: Gait Modified independent;Assistive device;Greater than 200 feet   PT: Stairs Modified independent;3 stairs;Rail on both sides   Interventions   Interventions Quick Adds Gait Training;Therapeutic Activity;Therapeutic Procedure   Therapeutic Activity   Therapeutic Activities: dynamic activities to improve functional performance Minutes (76709) 5   Symptoms Noted During/After Treatment None   Treatment Detail/Skilled Intervention Pt supine upon entry. Pt was cued for supine to sit, SBA and increased effort. CGA for transition into standing with requested bed elevated. Pt was cued for safe return recliner.   Gait Training   Gait Training Minutes (64777) 10   Treatment Detail/Skilled Intervention Pt was cued for ambulation, no increase in symptoms. 100 feet. FWW. Cues for proximity to walker and foot clearance.   PT Discharge Planning   PT Plan review exercises, inc independence with transfers, inc ambulation distance, trial stairs   PT Discharge Recommendation (DC Rec) Transitional Care Facility;home with assist;home with home care physical therapy   PT Rationale for DC Rec Pt inde with mobility and ADLs at baseline with light use of cane. Pt currently needing A with mobility and ADLs, but likely to impove quickly, defer to OT re:pt ability to safetly perform wound cares.   PT Brief overview of current status Ax1 FWW   PT Total Distance Amb During Session (feet) 100    Physical Therapy Time and Intention   Timed Code Treatment Minutes 15   Total Session Time (sum of timed and untimed services) 20

## 2025-07-30 NOTE — PLAN OF CARE
"VSS. PO dilaudid/atarax/ IV dilaudid given for RT hip pain. A/OX4. RT PICC dressing clean, dry, intact. Patent. Regular diet w/ good appetite. Assist of 1 w/ gt belt and walker. Wound vac continued. Mag/K+ protocol-recheck in the AM. Social work, ID, OT,PT, ortho following. Discharge to TCU TBD.    Goal Outcome Evaluation:      Plan of Care Reviewed With: patient    Overall Patient Progress: improvingOverall Patient Progress: improving    Outcome Evaluation: Pain control, encourage activity      Problem: Adult Inpatient Plan of Care  Goal: Plan of Care Review  Description: The Plan of Care Review/Shift note should be completed every shift.  The Outcome Evaluation is a brief statement about your assessment that the patient is improving, declining, or no change.  This information will be displayed automatically on your shift  note.  Outcome: Progressing  Flowsheets (Taken 7/30/2025 1201)  Outcome Evaluation: Pain control, encourage activity  Plan of Care Reviewed With: patient  Overall Patient Progress: improving  Goal: Patient-Specific Goal (Individualized)  Description: You can add care plan individualizations to a care plan. Examples of Individualization might be:  \"Parent requests to be called daily at 9am for status\", \"I have a hard time hearing out of my right ear\", or \"Do not touch me to wake me up as it startles  me\".  Outcome: Progressing  Goal: Absence of Hospital-Acquired Illness or Injury  Outcome: Progressing  Intervention: Identify and Manage Fall Risk  Recent Flowsheet Documentation  Taken 7/30/2025 1155 by Pura Darden, RN  Safety Promotion/Fall Prevention:   activity supervised   nonskid shoes/slippers when out of bed   safety round/check completed  Intervention: Prevent Skin Injury  Recent Flowsheet Documentation  Taken 7/30/2025 1155 by Pura Darden, RN  Body Position: position changed independently  Intervention: Prevent and Manage VTE (Venous Thromboembolism) Risk  Recent Flowsheet " Documentation  Taken 7/30/2025 1155 by Pura Darden RN  VTE Prevention/Management:   SCDs off (sequential compression devices)   patient refused intervention  Intervention: Prevent Infection  Recent Flowsheet Documentation  Taken 7/30/2025 1155 by Pura Darden RN  Infection Prevention:   hand hygiene promoted   personal protective equipment utilized   rest/sleep promoted  Goal: Optimal Comfort and Wellbeing  Outcome: Progressing  Intervention: Monitor Pain and Promote Comfort  Recent Flowsheet Documentation  Taken 7/30/2025 1136 by Pura Darden RN  Pain Management Interventions:   medication (see MAR)   repositioned  Taken 7/30/2025 0939 by Pura Darden RN  Pain Management Interventions:   medication (see MAR)   repositioned  Goal: Readiness for Transition of Care  Outcome: Progressing     Problem: Infection  Goal: Absence of Infection Signs and Symptoms  Outcome: Progressing  Intervention: Prevent or Manage Infection  Recent Flowsheet Documentation  Taken 7/30/2025 1155 by Pura Darden RN  Isolation Precautions: contact precautions maintained     Problem: Pain Acute  Goal: Optimal Pain Control and Function  Outcome: Progressing  Intervention: Develop Pain Management Plan  Recent Flowsheet Documentation  Taken 7/30/2025 1136 by Pura Darden RN  Pain Management Interventions:   medication (see MAR)   repositioned  Taken 7/30/2025 0939 by Pura Darden RN  Pain Management Interventions:   medication (see MAR)   repositioned  Intervention: Prevent or Manage Pain  Recent Flowsheet Documentation  Taken 7/30/2025 1155 by Pura Darden RN  Medication Review/Management: medications reviewed

## 2025-07-30 NOTE — PROGRESS NOTES
"   07/30/25 1420   Appointment Info   Signing Clinician's Name / Credentials (OT) Corazon Bhatt, MANAND, OTR/L   Rehab Comments (OT) RLE WBAT   Living Environment   People in Home alone   Current Living Arrangements apartment   Home Accessibility stairs to enter home   Number of Stairs, Main Entrance 3   Stair Railings, Main Entrance railings on both sides of stairs   Transportation Anticipated family or friend will provide   Living Environment Comments Per previous chart review from recent hospitalization: \"Pt lives alone in apartment with 3 MICKEY and elevators within. Pt has family that lives nearby and a care coordinator that is around 1-5 everyday and availabler 24 hrs via phone. Pt does not mentions any concerns managing at home.\"   Self-Care   Usual Activity Tolerance good   Current Activity Tolerance moderate   Equipment Currently Used at Home cane, straight;walker, rolling;grab bar, toilet;grab bar, tub/shower   Fall history within last six months yes   Number of times patient has fallen within last six months 2   Activity/Exercise/Self-Care Comment Pt reports ind w/ ADLs at baseline. PCA assists w/ socks. Pt has SPC, FWW, 4WW for use as needed   Instrumental Activities of Daily Living (IADL)   IADL Comments PCA assists w/ laundry, med mgmt, driving as needed. Pt and PCA cook together   General Information   Onset of Illness/Injury or Date of Surgery 07/28/25   Referring Physician Bala Randall MD   Patient/Family Therapy Goal Statement (OT) Return home and be ind.   Additional Occupational Profile Info/Pertinent History of Current Problem Kimo Greenwood is a 44 year old male admitted on 7/28/2025.  He is a morbidly obese male with a mild cognitive impairment who had right hip arthroplasty in April 2025 which was complicated by surgical site infection.  He had I&D and revision of had an acetabular liner and OR cultures grew MRSA.  He was discharged 7/28 with a right hip wound VAC and IV daptomycin " at home.     When he reached home, his care manager noticed that the wound VAC was full of serosanguineous discharge.  The wound VAC was changed and he fell up 3 containers in quick succession and was therefore brought back to ER.  His care manager also had some concerns about his ability to take care of the wound VAC by himself.     He was otherwise afebrile and hemodynamically stable, rather high systolic blood pressure in 180s.  He was lying comfortably on the bed and eating a sandwich.  Had hemoglobin of 7.7 which is actually better than 7.65 days ago. BUN/creatinine of 11.3/1.4.  CT of the right hip showed marked interval decrease in the fluid and gas collection along the right hip surgical track.     He was seen by orthopedics and plan is for evacuation of hematoma and wound closure in the OR.  ID has also been consulted.  Will continue to work with PT/OT and at this point the plan is for discharge to TCU.   Existing Precautions/Restrictions fall;weight bearing   Right Lower Extremity (Weight-bearing Status) weight-bearing as tolerated (WBAT)   Cognitive Status Examination   Orientation Status orientation to person, place and time   Affect/Mental Status (Cognitive) flat/blunted affect;WFL   Visual Perception   Visual Impairment/Limitations WFL   Sensory   Sensory Quick Adds sensation intact   Sensory Comments Wound VAC right hip   Pain Assessment   Patient Currently in Pain No   Range of Motion Comprehensive   General Range of Motion no range of motion deficits identified   Strength Comprehensive (MMT)   Comment, General Manual Muscle Testing (MMT) Assessment General weakness, BUE WFL.   Bed Mobility   Comment (Bed Mobility) Did not assess-defer to PT.   Transfers   Transfer Comments CGA and FWW   Sit-Stand Transfer   Sit/Stand Transfer Comments FWW and CGA   Toilet Transfer   Toilet Transfer Comments Anticipate CGA and FWW   Balance   Balance Comments Overall steady with FWW   Activities of Daily Living    BADL Assessment/Intervention lower body dressing;grooming;toileting   Lower Body Dressing Assessment/Training   Comment, (Lower Body Dressing) Max A without AE   Grooming Assessment/Training   Comment, (Grooming) set up   Toileting   Comment, (Toileting) Anticipater CGA   Clinical Impression   Criteria for Skilled Therapeutic Interventions Met (OT) Yes, treatment indicated   OT Diagnosis impaired ADLs   Influenced by the following impairments Right lower extremity hematoma   OT Problem List-Impairments impacting ADL problems related to;activity tolerance impaired;mobility;range of motion (ROM);strength;pain;post-surgical precautions   Assessment of Occupational Performance 3-5 Performance Deficits   Identified Performance Deficits dressing, toielting, bathing, home mgmt   Planned Therapy Interventions (OT) ADL retraining;IADL retraining;ROM;strengthening;transfer training;home program guidelines;progressive activity/exercise;risk factor education   Clinical Decision Making Complexity (OT) problem focused assessment/low complexity   Risk & Benefits of therapy have been explained evaluation/treatment results reviewed;care plan/treatment goals reviewed;risks/benefits reviewed;current/potential barriers reviewed;participants voiced agreement with care plan;participants included;patient   OT Total Evaluation Time   OT Eval, Low Complexity Minutes (32406) 5   OT Goals   Therapy Frequency (OT) Daily   OT Predicted Duration/Target Date for Goal Attainment 08/05/25   OT Goals Hygiene/Grooming;Lower Body Dressing;Transfers;Toilet Transfer/Toileting;OT Goal 1   OT: Goal 1 Pt will be able to demonstrate proper wound VAC care with independent level prior to d/c.   Self-Care/Home Management   Self-Care/Home Mgmt/ADL, Compensatory, Meal Prep Minutes (75367) 10   Symptoms Noted During/After Treatment (Meal Preparation/Planning Training) none   Treatment Detail/Skilled Intervention Pt greeted seated in recliner and agreeable to  therapy following just finishing with PT session. Pt able to STS with CGA with FWW and ambulate within room and into bathroom with CGA and FWW. Steady gait with no LOB. Particiapted in standing g/h cares with set up assist and CGA/SBA prior to returning to EOB for nursing cares. Left wiwendy RN.   OT Discharge Planning   OT Plan g/h cares and wound vac cares, tub tx, LB dressing with AE?   OT Discharge Recommendation (DC Rec) Transitional Care Facility;home with assist;home with home care occupational therapy   OT Rationale for DC Rec Pt below baseline in ADLs and functional mobility, currently CGA and using FWW-at baseline pt independent with msot A/IADLs and use of cane. Does have PCA services to assist. Current concern is pt properly caring for wound vac at home. Anticipate pt will continue to progress to not requiring TCU and returning home. If returns home may benefit from  OT for home safety / RN for wound care and vac. Will follow.   OT Brief overview of current status CGA and FWW   OT Total Distance Amb During Session (feet) 30   Total Session Time   Timed Code Treatment Minutes 10   Total Session Time (sum of timed and untimed services) 15

## 2025-07-30 NOTE — PROGRESS NOTES
[facility]    Hospitalist Progress Note  Name: Kimo Greenwood    MRN: [id number]  Provider:  Marlo Adams DO MPH  Date of Service: 07/30/2025    Summary of Stay: Kimo Greenwood is a 44 year old male admitted on 7/28/2025.  He is a morbidly obese male with a mild cognitive impairment who had right hip arthroplasty in April 2025 which was complicated by surgical site infection.  He had I&D and revision of had an acetabular liner and OR cultures grew MRSA.  He was discharged 7/28 with a right hip wound VAC and IV daptomycin at home.     When he reached home, his care manager noticed that the wound VAC was full of serosanguineous discharge.  The wound VAC was changed and he fell up 3 containers in quick succession and was therefore brought back to ER.  His care manager also had some concerns about his ability to take care of the wound VAC by himself.     He was otherwise afebrile and hemodynamically stable, rather high systolic blood pressure in 180s.  He was lying comfortably on the bed and eating a sandwich.  Had hemoglobin of 7.7 which is actually better than 7.65 days ago. BUN/creatinine of 11.3/1.4.  CT of the right hip showed marked interval decrease in the fluid and gas collection along the right hip surgical track.    He was seen by orthopedics and plan is for evacuation of hematoma and wound closure in the OR.  ID has also been consulted.  Will continue to work with PT/OT and at this point the plan is for discharge to TCU.     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  Hemoglobin is stable.  Check venous ultrasound bilateral lower extremities.  -Continue Lasix 40 mg IV twice daily.  Monitor BMP (labs from today pending).   Elevate bilateral lower extremities.  -Continue daptomycin and rifampin.    -Consulted ID and orthopedics.  -Evacuation of hematoma and wound VAC closure in the OR 7/29 with Dr. Randall.     Acute kidney injury  - Appears to plateau around 1.4, monitor closely as patient is on diuretics.     Mild cognitive impairment  -Lives independently at his own apartment with close follow up visits from  who is concerned about his ability to manage wound VAC himself.  -Social work consult and likely needs TCU placement per his mother.     Essential hypertension  - Continue prazosin.    Progressive anemia  -Progressive worsening of hemoglobin, likely multifactorial including surgical blood loss, phlebotomy, hemodilution, malnutrition and chronic disease  -Hemoglobin 7.1, will transfuse for value of <7  -AM CBC  -Type and screen ordered and consent signed     Chronic medical conditions  - Recent hip surgery.  On aspirin 325 twice daily for DVT prophylaxis.  - Morbid obesity.  On Ozempic at home, can be resumed on discharge.  - History of depression.  On Cymbalta and duloxetine.  - Bronchial asthma.  Not in exacerbation.  - History of Patel's esophagus and GERD.  Continue home regimen of PPI.  - Overactive bladder.  On oxybutynin.  - History of gout.  Was recently treated with colchicine and steroid burst.  Resumed allopurinol.  - Sleep apnea.  Continue CPAP.  - Subacute anemia.  Likely due to chronic disease, surgical/wound blood loss, and phlebotomy.  Monitor hemoglobin as patient has increased serosanguineous discharge.    DVT Prophylaxis: ASA for now per orthopedics.  Code Status: Full Code  Diet: Advance Diet as Tolerated: Regular Diet Adult    Germain Catheter: Not present  Disposition: Expected discharge in 2-3 days to TCU. Goals prior to discharge include surgery and placement.   Incidental Findings: As above.  Family updated today: Yes mother at bedside.  Medically Ready for Discharge: Anticipated in 2-4  Days    40 MINUTES SPENT BY ME on the date of service doing chart review, history, exam, documentation & further activities per the note.      Interval History   Assumed care from previous hospitalist. The history was fully reviewed.  The patient reports doing well. No chest pain or shortness of breath. No nausea, vomiting, diarrhea, constipation. No fevers. No other specific complaints identified.     -Data reviewed today: I personally reviewed all new labs and imaging results over the last 24 hours.     Physical Exam   Temp: 97.6  F (36.4  C) Temp src: Oral BP: 129/67 Pulse: 80   Resp: 20 SpO2: 97 % O2 Device: None (Room air) Oxygen Delivery: 2 LPM  Vitals:    07/28/25 1657 07/28/25 2201 07/30/25 0601   Weight: (!) 149.7 kg (330 lb) (!) 167.4 kg (369 lb 0.8 oz) (!) 162.8 kg (358 lb 14.5 oz)     Vital Signs with Ranges  Temp:  [97.4  F (36.3  C)-98  F (36.7  C)] 97.6  F (36.4  C)  Pulse:  [78-98] 80  Resp:  [12-20] 20  BP: (102-145)/(48-75) 129/67  FiO2 (%):  [21 %] 21 %  SpO2:  [91 %-100 %] 97 %  I/O last 3 completed shifts:  In: 980 [P.O.:480; I.V.:500]  Out: 3150 [Urine:3100; Blood:50]    GENERAL: No apparent distress. Awake, alert, and fully oriented.  HEENT: Normocephalic, atraumatic. Extraocular movements intact.  CARDIOVASCULAR: Regular rate and rhythm without murmurs or rubs. No S3.  PULMONARY: Clear bilaterally.  GASTROINTESTINAL: Soft, non-tender, non-distended. Bowel sounds normoactive.   EXTREMITIES: No cyanosis or clubbing. Trace edema.  NEUROLOGICAL: CN 2-12 grossly intact, no focal neurological deficits.  DERMATOLOGICAL: No rash, ulcer, bruising, nor jaundice. Right leg wound vac in place.    Medications   Current Facility-Administered Medications   Medication Dose Route Frequency Provider Last Rate Last Admin    lactated ringers infusion   Intravenous Continuous Bala Randall  mL/hr at 07/29/25 1706 New Bag at 07/29/25 1706     Current Facility-Administered Medications   Medication  Dose Route Frequency Provider Last Rate Last Admin    acetaminophen (TYLENOL) tablet 975 mg  975 mg Oral Q8H Bala Randall MD   975 mg at 07/30/25 0940    allopurinol (ZYLOPRIM) tablet 300 mg  300 mg Oral Daily Bala Randall MD   300 mg at 07/30/25 0941    aspirin (ASA) EC tablet 325 mg  325 mg Oral BID Bala Randall MD   325 mg at 07/30/25 0941    DAPTOmycin (CUBICIN) 1,000 mg in sodium chloride 0.9 % 100 mL intermittent infusion  6 mg/kg Intravenous Q24H Thai Thao MD   1,000 mg at 07/29/25 2252    doxycycline hyclate (VIBRAMYCIN) capsule 50 mg  50 mg Oral BID Bala Randall MD   50 mg at 07/30/25 0943    DULoxetine (CYMBALTA) DR capsule 120 mg  120 mg Oral Daily Bala Randall MD   120 mg at 07/30/25 0941    famotidine (PEPCID) tablet 20 mg  20 mg Oral BID Bala Randall MD   20 mg at 07/30/25 0941    Or    famotidine (PEPCID) injection 20 mg  20 mg Intravenous BID Bala Randall MD        ferrous sulfate (FEROSUL) tablet 325 mg  325 mg Oral Daily with breakfast Bala Randall MD   325 mg at 07/30/25 0941    furosemide (LASIX) injection 40 mg  40 mg Intravenous BID Bala Randall MD   40 mg at 07/30/25 0943    magnesium oxide (MAG-OX) half-tab 200 mg  200 mg Oral Daily Bala Randall MD   200 mg at 07/30/25 0941    mirabegron (MYRBETRIQ) 24 hr tablet 50 mg  50 mg Oral Daily Bala Randall MD   50 mg at 07/30/25 0943    OLANZapine (zyPREXA) tablet 15 mg  15 mg Oral At Bedtime Bala Randall MD   15 mg at 07/29/25 2248    oxyBUTYnin ER (DITROPAN XL) 24 hr tablet 30 mg  30 mg Oral Daily Bala Randall MD   30 mg at 07/30/25 0941    polyethylene glycol (MIRALAX) Packet 17 g  17 g Oral Daily Bala Randall MD        [Held by provider] potassium gluconate tablet 2.5 mEq  2.5 mEq Oral Daily Thai Thao MD        prazosin (MINIPRESS) capsule 3 mg  3 mg Oral At  Bedtime Bala Randall MD   3 mg at 07/29/25 2249    rifampin (RIFADIN) capsule 300 mg  300 mg Oral BID Bala Randall MD   300 mg at 07/30/25 0941    senna-docusate (SENOKOT-S/PERICOLACE) 8.6-50 MG per tablet 1 tablet  1 tablet Oral BID Bala Randall MD   1 tablet at 07/30/25 0941    Or    senna-docusate (SENOKOT-S/PERICOLACE) 8.6-50 MG per tablet 2 tablet  2 tablet Oral BID Bala Randall MD        sodium chloride (PF) 0.9% PF flush 10-40 mL  10-40 mL Intracatheter Q7 Days Marlo Adams, DO   30 mL at 07/30/25 0942    sodium chloride (PF) 0.9% PF flush 10-40 mL  10-40 mL Intracatheter Daily Marlo Adams, DO   30 mL at 07/30/25 0942    sodium chloride (PF) 0.9% PF flush 3 mL  3 mL Intracatheter Q8H Bala Randall MD   3 mL at 07/30/25 0942    sodium chloride (PF) 0.9% PF flush 3 mL  3 mL [facility]acatheter Q8H [facility] Bala [facility] MD   3 mL at 07/30/25 0620    tranexamic acid 1 g in 100 mL NS IV bag (premix)  1 g Intravenous Once Bala Randall MD        tranexamic acid 1 g in 100 mL NS IV bag (premix)  1 g Intravenous Once Bala Randall MD         Data     Laboratory:  Recent Labs   Lab 07/30/25  0617 07/29/25  0644 07/28/25  1740 07/27/25  0535   WBC  --  8.0 10.9  --    HGB 7.1* 7.6* 7.7*  --    HCT  --  22.6* 23.5*  --    MCV 86 85 86 85   PLT  --  332 396 366     Recent Labs   Lab 07/30/25  0617 07/30/25  0606 07/29/25  2346 07/29/25  1955 07/29/25  1344 07/29/25  0644 07/28/25 1740 07/28/25  0517   NA  --   --   --   --   --  142 140 143   POTASSIUM 3.8  --   --   --   --  4.0 3.7 4.1   CHLORIDE  --   --   --   --   --  108* 105 106   CO2  --   --   --   --   --  27 26 27   ANIONGAP  --   --   --   --   --  7 9 10   GLC  --  90 115* 102*   < > 106* 122* 93   BUN  --   --   --   --   --  10.6 11.3 12.2   CR  --   --   --   --   --  1.47* 1.40* 1.44*   GFRESTIMATED  --   --   --   --   --  60* 64 62   FILEMON  --   --    "--   --   --  9.1 9.4 9.2    < > = values in this interval not displayed.     No results for input(s): \"CULT\" in the last 168 hours.    Imaging:  No results found for this or any previous visit (from the past 24 hours).        Marlo Adams DO MPH  [facility] Hospitalist  [address] [place] [address]  07/30/2025   "

## 2025-07-30 NOTE — PROGRESS NOTES
[facility]    Infectious Disease Progress Note    Date of Service (when I saw the patient): 07/30/2025     Assessment & Plan   Kimo Greenwood is a 44 year old male who was admitted on 7/19/2025.     Impression:  45 yo male with history of a right total hip arthroplasty in April 2025.    Approx 10 days ago he fell and began developing right hip pain and swelling.    He was seen in urgent care and ER visits on multiple occasions and eventually was placed on oral antibiotics.    With continued pain and swelling presented to the emergency room 2 days ago and was admitted for possibly infected right hip wound.    S/p I and D and combined, with revision of head and acetabular liner   OR culture with staph aureus MRSA, 1 culture with staph epi but clearly MRSA the pathogen here  MRSA PCR Positive      Recommendations   Continue daptomycin at purposely high dose of 1000 mg, by lean body weight this is about 8 to 10 mg/kg which is the desired dose here, still suspect creatinine will improve and make that the correct dose  Continue rifampin which he is tolerating okay  Still risk for that this will fail but nothing to suggest that currently so plan on full course of IV treatment followed by extended oral      Didier Andrade MD    Interval History   readmitted with drainage and possible infected seroma, discussed with Dr. Randall not obvious infection fully drained now creatinine stable      Physical Exam   Temp: 97.6  F (36.4  C) Temp src: Oral BP: 129/67 Pulse: 80   Resp: 20 SpO2: 97 % O2 Device: None (Room air) Oxygen Delivery: 2 LPM  Vitals:    07/28/25 1657 07/28/25 2201 07/30/25 0601   Weight: (!) 149.7 kg (330 lb) (!) 167.4 kg (369 lb 0.8 oz) (!) 162.8 kg (358 lb 14.5 oz)     Vital Signs with Ranges  Temp:  [97  F (36.1  C)-98  F (36.7  C)] 97.6  F (36.4  C)  Pulse:  [78-98] 80  Resp:  [12-20] 20  BP: (102-145)/(48-75) 129/67  FiO2 (%):  [21 %] 21 %  SpO2:  [91 %-100 %] 97 %      Medications    Current Facility-Administered Medications   Medication Dose Route Frequency Provider Last Rate Last Admin    lactated ringers infusion   Intravenous Continuous Bala Randall  mL/hr at 07/29/25 1706 New Bag at 07/29/25 1706     Current Facility-Administered Medications   Medication Dose Route Frequency Provider Last Rate Last Admin    acetaminophen (TYLENOL) tablet 975 mg  975 mg Oral Q8H Bala Randall MD   975 mg at 07/30/25 0940    allopurinol (ZYLOPRIM) tablet 300 mg  300 mg Oral Daily Bala Randall MD   300 mg at 07/30/25 0941    aspirin (ASA) EC tablet 325 mg  325 mg Oral BID Bala Randall MD   325 mg at 07/30/25 0941    DAPTOmycin (CUBICIN) 1,000 mg in sodium chloride 0.9 % 100 mL intermittent infusion  6 mg/kg Intravenous Q24H Thai Thao MD   1,000 mg at 07/29/25 2252    doxycycline hyclate (VIBRAMYCIN) capsule 50 mg  50 mg Oral BID Bala Randall MD   50 mg at 07/30/25 0943    DULoxetine (CYMBALTA) DR capsule 120 mg  120 mg Oral Daily Bala Randall MD   120 mg at 07/30/25 0941    famotidine (PEPCID) tablet 20 mg  20 mg Oral BID Bala Randall MD   20 mg at 07/30/25 0941    Or    famotidine (PEPCID) injection 20 mg  20 mg Intravenous BID Bala Randall MD        ferrous sulfate (FEROSUL) tablet 325 mg  325 mg Oral Daily with breakfast Bala Randall MD   325 mg at 07/30/25 0941    furosemide (LASIX) injection 40 mg  40 mg Intravenous BID Bala Randall MD   40 mg at 07/30/25 0943    magnesium oxide (MAG-OX) half-tab 200 mg  200 mg Oral Daily Bala Randall MD   200 mg at 07/30/25 0941    mirabegron (MYRBETRIQ) 24 hr tablet 50 mg  50 mg Oral Daily Bala Randall MD   50 mg at 07/30/25 0943    OLANZapine (zyPREXA) tablet 15 mg  15 mg Oral At Bedtime Bala Randall MD   15 mg at 07/29/25 224    oxyBUTYnin ER (DITROPAN XL) 24 hr tablet 30 mg  30 mg Oral  Daily Bala Randall MD   30 mg at 07/30/25 0941    polyethylene glycol (MIRALAX) Packet 17 g  17 g Oral Daily Bala Randall MD        [Held by provider] potassium gluconate tablet 2.5 mEq  2.5 mEq Oral Daily Thai Thao MD        prazosin (MINIPRESS) capsule 3 mg  3 mg Oral At Bedtime Bala Randall MD   3 mg at 07/29/25 2249    rifampin (RIFADIN) capsule 300 mg  300 mg Oral BID Bala Randall MD   300 mg at 07/30/25 0941    senna-docusate (SENOKOT-S/PERICOLACE) 8.6-50 MG per tablet 1 tablet  1 tablet Oral BID Bala Randall MD   1 tablet at 07/30/25 0941    Or    senna-docusate (SENOKOT-S/PERICOLACE) 8.6-50 MG per tablet 2 tablet  2 tablet Oral BID Bala Randall MD        sodium chloride (PF) 0.9% PF flush 10-40 mL  10-40 mL Intracatheter Q7 Days Marlo Adams, DO   30 mL at 07/30/25 0942    sodium chloride (PF) 0.9% PF flush 10-40 mL  10-40 mL Intracatheter Daily Marlo Adams, DO   30 mL at 07/30/25 0942    sodium chloride (PF) 0.9% PF flush 3 mL  3 mL Intracatheter Q8H Bala Randall MD   3 mL at 07/30/25 0942    sodium chloride (PF) 0.9% PF flush 3 mL  3 mL Intracatheter Q8H [facility] Bala Randall MD   3 mL at 07/30/25 0620    tranexamic acid 1 g in 100 mL NS IV bag (premix)  1 g Intravenous Once Bala Randall MD        tranexamic acid 1 g in 100 mL NS IV bag (premix)  1 g Intravenous Once Bala Randall MD           Data   All microbiology laboratory data reviewed.  Recent Labs   Lab Test 07/30/25  0617 07/29/25  0644 07/28/25  1740 07/27/25  0535 07/24/25  0504 07/23/25  0552 07/22/25  0735   WBC  --  8.0 10.9  --   --  9.0 15.7*   HGB 7.1* 7.6* 7.7*  --   --  7.6* 8.9*  8.9*   HCT  --  22.6* 23.5*  --   --   --  25.7*   MCV 86 85 86 85   < > 85  85 84  84   PLT  --  332 396 366   < >  --  326    < > = values in this interval not displayed.     Recent Labs   Lab Test 07/29/25  0644  "07/28/25  1740 07/28/25  0517   CR 1.47* 1.40* 1.44*     Recent Labs   Lab Test 07/19/25  1431   SED 76*     Recent Labs   Lab Test 06/24/19  0844 06/24/19  0843   CULT No anaerobes isolated  No anaerobes isolated  No growth  No growth No anaerobes isolated  No growth       All cultures:  No results for input(s): \"CULTURE\" in the last 168 hours.     Blood culture:  Results for orders placed or performed during the hospital encounter of 07/19/25   Blood Culture Peripheral blood (BC) Hand, Left    Collection Time: 07/19/25  4:15 PM    Specimen: Hand, Left; Peripheral blood (BC)   Result Value Ref Range    Culture No Growth    Blood Culture Peripheral blood (BC) Arm, Right    Collection Time: 07/19/25  2:32 PM    Specimen: Arm, Right; Peripheral blood (BC)   Result Value Ref Range    Culture No Growth    Results for orders placed or performed during the hospital encounter of 05/23/25   Blood Culture Peripheral blood (BC) Arm, Right    Collection Time: 05/23/25  9:18 PM    Specimen: Arm, Right; Peripheral blood (BC)   Result Value Ref Range    Culture No Growth      *Note: Due to a large number of results and/or encounters for the requested time period, some results have not been displayed. A complete set of results can be found in Results Review.      Urine culture:  No results found. However, due to the size of the patient record, not all encounters were searched. Please check Results Review for a complete set of results.          "

## 2025-07-30 NOTE — PLAN OF CARE
Goal Outcome Evaluation:      Plan of Care Reviewed With: patient, parent, guardian    Overall Patient Progress: improvingOverall Patient Progress: improving    Outcome Evaluation: Continue to follow for discharge planning.

## 2025-07-30 NOTE — PROGRESS NOTES
Orthopedic Surgery  7/30/2025    S: Patient voices no complaints today.     O: Blood pressure 129/67, pulse 80, temperature 97.6  F (36.4  C), temperature source Oral, resp. rate 20, height 1.829 m (6'), weight (!) 162.8 kg (358 lb 14.5 oz), SpO2 97%.  Lab Results   Component Value Date    HGB 7.1 07/30/2025    HGB 13.4 10/26/2020     Lab Results   Component Value Date    INR 1.20 07/21/2025    INR 0.92 01/09/2017        Neurovascularly intact.  Calves are negative bilaterally, both soft and nontender.  The wound is dressed with Provena  The wound looks good with minimal erythema of the surrounding skin.    A: Mr. Greenwood is doing well status post Procedure(s):  Irrigation and debridement, evacuation hematoma right hip.    P: Continue physical therapy.   ID: continue daptomycin  Anticoagulation with ASA  Pain management  Discharge planning, likely to TCU    Bala Randall MD  [phone]

## 2025-07-31 ENCOUNTER — APPOINTMENT (OUTPATIENT)
Dept: OCCUPATIONAL THERAPY | Facility: CLINIC | Age: 45
DRG: 857 | End: 2025-07-31
Payer: COMMERCIAL

## 2025-07-31 ENCOUNTER — APPOINTMENT (OUTPATIENT)
Dept: PHYSICAL THERAPY | Facility: CLINIC | Age: 45
DRG: 857 | End: 2025-07-31
Attending: HOSPITALIST
Payer: COMMERCIAL

## 2025-07-31 VITALS
HEIGHT: 72 IN | TEMPERATURE: 97.9 F | RESPIRATION RATE: 18 BRPM | HEART RATE: 85 BPM | SYSTOLIC BLOOD PRESSURE: 115 MMHG | DIASTOLIC BLOOD PRESSURE: 59 MMHG | OXYGEN SATURATION: 94 % | BODY MASS INDEX: 42.66 KG/M2 | WEIGHT: 315 LBS

## 2025-07-31 LAB
ANION GAP SERPL CALCULATED.3IONS-SCNC: 11 MMOL/L (ref 7–15)
BACTERIA ASPIRATE CULT: NORMAL
BUN SERPL-MCNC: 10.3 MG/DL (ref 6–20)
CALCIUM SERPL-MCNC: 8.9 MG/DL (ref 8.8–10.4)
CHLORIDE SERPL-SCNC: 102 MMOL/L (ref 98–107)
CREAT SERPL-MCNC: 1.57 MG/DL (ref 0.67–1.17)
EGFRCR SERPLBLD CKD-EPI 2021: 56 ML/MIN/1.73M2
ERYTHROCYTE [DISTWIDTH] IN BLOOD BY AUTOMATED COUNT: 14.6 % (ref 10–15)
GLUCOSE BLDC GLUCOMTR-MCNC: 104 MG/DL (ref 70–99)
GLUCOSE SERPL-MCNC: 84 MG/DL (ref 70–99)
HCO3 SERPL-SCNC: 28 MMOL/L (ref 22–29)
HCT VFR BLD AUTO: 22.5 % (ref 40–53)
HGB BLD-MCNC: 7.4 G/DL (ref 13.3–17.7)
MAGNESIUM SERPL-MCNC: 1.8 MG/DL (ref 1.7–2.3)
MCH RBC QN AUTO: 28.4 PG (ref 26.5–33)
MCHC RBC AUTO-ENTMCNC: 32.9 G/DL (ref 31.5–36.5)
MCV RBC AUTO: 86 FL (ref 78–100)
PLATELET # BLD AUTO: 333 10E3/UL (ref 150–450)
POTASSIUM SERPL-SCNC: 3.5 MMOL/L (ref 3.4–5.3)
RBC # BLD AUTO: 2.61 10E6/UL (ref 4.4–5.9)
SODIUM SERPL-SCNC: 141 MMOL/L (ref 135–145)
WBC # BLD AUTO: 8.9 10E3/UL (ref 4–11)

## 2025-07-31 PROCEDURE — 250N000013 HC RX MED GY IP 250 OP 250 PS 637: Performed by: ORTHOPAEDIC SURGERY

## 2025-07-31 PROCEDURE — 97140 MANUAL THERAPY 1/> REGIONS: CPT | Mod: GP | Performed by: PHYSICAL THERAPIST

## 2025-07-31 PROCEDURE — 250N000011 HC RX IP 250 OP 636: Performed by: STUDENT IN AN ORGANIZED HEALTH CARE EDUCATION/TRAINING PROGRAM

## 2025-07-31 PROCEDURE — 250N000013 HC RX MED GY IP 250 OP 250 PS 637: Performed by: HOSPITALIST

## 2025-07-31 PROCEDURE — 97535 SELF CARE MNGMENT TRAINING: CPT | Mod: GO

## 2025-07-31 PROCEDURE — 85027 COMPLETE CBC AUTOMATED: CPT | Performed by: HOSPITALIST

## 2025-07-31 PROCEDURE — 258N000003 HC RX IP 258 OP 636: Performed by: STUDENT IN AN ORGANIZED HEALTH CARE EDUCATION/TRAINING PROGRAM

## 2025-07-31 PROCEDURE — 80048 BASIC METABOLIC PNL TOTAL CA: CPT | Performed by: HOSPITALIST

## 2025-07-31 PROCEDURE — 97530 THERAPEUTIC ACTIVITIES: CPT | Mod: GO

## 2025-07-31 PROCEDURE — 97116 GAIT TRAINING THERAPY: CPT | Mod: GP | Performed by: PHYSICAL THERAPIST

## 2025-07-31 PROCEDURE — 999N000157 HC STATISTIC RCP TIME EA 10 MIN

## 2025-07-31 PROCEDURE — 120N000001 HC R&B MED SURG/OB

## 2025-07-31 PROCEDURE — 82310 ASSAY OF CALCIUM: CPT | Performed by: HOSPITALIST

## 2025-07-31 PROCEDURE — 83735 ASSAY OF MAGNESIUM: CPT | Performed by: HOSPITALIST

## 2025-07-31 PROCEDURE — 99232 SBSQ HOSP IP/OBS MODERATE 35: CPT | Performed by: INTERNAL MEDICINE

## 2025-07-31 PROCEDURE — 94660 CPAP INITIATION&MGMT: CPT

## 2025-07-31 PROCEDURE — 99232 SBSQ HOSP IP/OBS MODERATE 35: CPT | Performed by: HOSPITALIST

## 2025-07-31 RX ADMIN — MIRABEGRON 50 MG: 50 TABLET, EXTENDED RELEASE ORAL at 09:09

## 2025-07-31 RX ADMIN — FAMOTIDINE 20 MG: 20 TABLET, FILM COATED ORAL at 21:22

## 2025-07-31 RX ADMIN — OLANZAPINE 15 MG: 10 TABLET, FILM COATED ORAL at 21:24

## 2025-07-31 RX ADMIN — ASPIRIN 325 MG: 325 TABLET, COATED ORAL at 09:04

## 2025-07-31 RX ADMIN — ACETAMINOPHEN 975 MG: 325 TABLET ORAL at 16:14

## 2025-07-31 RX ADMIN — DULOXETINE 120 MG: 60 CAPSULE, DELAYED RELEASE ORAL at 09:05

## 2025-07-31 RX ADMIN — DOXYCYCLINE HYCLATE 50 MG: 50 CAPSULE ORAL at 21:33

## 2025-07-31 RX ADMIN — DOXYCYCLINE HYCLATE 50 MG: 50 CAPSULE ORAL at 09:08

## 2025-07-31 RX ADMIN — PRAZOSIN HYDROCHLORIDE 3 MG: 1 CAPSULE ORAL at 21:23

## 2025-07-31 RX ADMIN — ALLOPURINOL 300 MG: 300 TABLET ORAL at 09:06

## 2025-07-31 RX ADMIN — HYDROMORPHONE HYDROCHLORIDE 2 MG: 2 TABLET ORAL at 16:14

## 2025-07-31 RX ADMIN — RIFAMPIN 300 MG: 300 CAPSULE ORAL at 09:05

## 2025-07-31 RX ADMIN — ASPIRIN 325 MG: 325 TABLET, COATED ORAL at 21:23

## 2025-07-31 RX ADMIN — Medication 5 MG: at 21:33

## 2025-07-31 RX ADMIN — DAPTOMYCIN 1000 MG: 500 INJECTION, POWDER, LYOPHILIZED, FOR SOLUTION INTRAVENOUS at 21:33

## 2025-07-31 RX ADMIN — HYDROMORPHONE HYDROCHLORIDE 2 MG: 2 TABLET ORAL at 21:22

## 2025-07-31 RX ADMIN — FERROUS SULFATE TAB 325 MG (65 MG ELEMENTAL FE) 325 MG: 325 (65 FE) TAB at 09:05

## 2025-07-31 RX ADMIN — OXYBUTYNIN CHLORIDE 30 MG: 5 TABLET, EXTENDED RELEASE ORAL at 09:05

## 2025-07-31 RX ADMIN — Medication 200 MG: at 09:05

## 2025-07-31 RX ADMIN — RIFAMPIN 300 MG: 300 CAPSULE ORAL at 21:35

## 2025-07-31 RX ADMIN — FAMOTIDINE 20 MG: 20 TABLET, FILM COATED ORAL at 09:06

## 2025-07-31 RX ADMIN — HYDROMORPHONE HYDROCHLORIDE 2 MG: 2 TABLET ORAL at 12:02

## 2025-07-31 ASSESSMENT — ACTIVITIES OF DAILY LIVING (ADL)
ADLS_ACUITY_SCORE: 39
ADLS_ACUITY_SCORE: 43
ADLS_ACUITY_SCORE: 43
ADLS_ACUITY_SCORE: 39
ADLS_ACUITY_SCORE: 42
ADLS_ACUITY_SCORE: 43
ADLS_ACUITY_SCORE: 39
ADLS_ACUITY_SCORE: 43
ADLS_ACUITY_SCORE: 39
ADLS_ACUITY_SCORE: 43
ADLS_ACUITY_SCORE: 43
ADLS_ACUITY_SCORE: 39
ADLS_ACUITY_SCORE: 43
ADLS_ACUITY_SCORE: 43
ADLS_ACUITY_SCORE: 39
ADLS_ACUITY_SCORE: 43
ADLS_ACUITY_SCORE: 39

## 2025-07-31 NOTE — PROGRESS NOTES
Orthopedic Surgery  7/31/2025  POD#: 2    S: Patient voices no complaints today. Sitting comfortably in chair. Denies pain.    O: Blood pressure 127/70, pulse 75, temperature 98.1  F (36.7  C), temperature source Oral, resp. rate 20, height 1.829 m (6'), weight (!) 163.1 kg (359 lb 9.1 oz), SpO2 95%.  Lab Results   Component Value Date    HGB 7.4 07/31/2025    HGB 13.4 10/26/2020     Lab Results   Component Value Date    INR 1.20 07/21/2025    INR 0.92 01/09/2017        I/O last 3 completed shifts:  In: 1200 [P.O.:1200]  Out: 1850 [Urine:1850]    Neurovascularly intact.  Calves are negative bilaterally, both soft and nontender.  The wound is C/D/I. Prevena dressing intact with no visible drainage.  The wound looks good with minimal erythema of the surrounding skin.    A: Mr. Greenwood is doing well status post Procedure(s):  Irrigation and debridement, evacuation hematoma right hip.    P: Prevena dressing tubing will need to be unhooked from hospital VAC unit, and attached to Prevena battery unit (in white plastic bag with pts belongings).  1. Mobilize and continue physical therapy. No hip precautions.  2. Anticoagulation - ASA BID  3. Septic R MARGO - s/p I&D with head and liner exchange, and repeat I&D with wound closure (7/29); Prevena dressing in place and receiving IV daptomycin for MARGO infection for total of 6 weeks.  4. Pain management - controlled  5. Anticipate discharge to TCU pending placement. It sounds like the daptomycin might be a barrier. OK to discharge from ortho perspective.      Judy Barragan PA-C  [facility]  O: [phone]

## 2025-07-31 NOTE — PROGRESS NOTES
[facility]    Hospitalist Progress Note  Name: Kimo Greenwood    MRN: [id number]  Provider:  Marlo Adams DO MPH  Date of Service: 07/31/2025    Summary of Stay: Kimo Greenwood is a 44 year old male admitted on 7/28/2025.  He is a morbidly obese male with a mild cognitive impairment who had right hip arthroplasty in April 2025 which was complicated by surgical site infection.  He had I&D and revision of had an acetabular liner and OR cultures grew MRSA.  He was discharged 7/28 with a right hip wound VAC and IV daptomycin at home.     When he reached home, his care manager noticed that the wound VAC was full of serosanguineous discharge.  The wound VAC was changed and he fell up 3 containers in quick succession and was therefore brought back to ER.  His care manager also had some concerns about his ability to take care of the wound VAC by himself.     He was otherwise afebrile and hemodynamically stable, rather high systolic blood pressure in 180s.  He was lying comfortably on the bed and eating a sandwich.  Had hemoglobin of 7.7 which is actually better than 7.65 days ago. BUN/creatinine of 11.3/1.4.  CT of the right hip showed marked interval decrease in the fluid and gas collection along the right hip surgical track.    He was seen by orthopedics and plan is for evacuation of hematoma and wound closure in the OR.  ID has also been consulted.  Will continue to work with PT/OT and at this point the plan is for discharge to TCU.     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  Hemoglobin is stable.  Check venous ultrasound bilateral lower extremities.  -Stop IV Lasix given slight increase in creatinine.  Elevate bilateral lower  extremities and wraps.  Lymphedema consult.  -Continue daptomycin and rifampin.    -Consulted ID and orthopedics.  -Evacuation of hematoma and wound VAC closure in the OR 7/29 with Dr. Randall.     Acute kidney injury  - Appears to plateau around 1.4 with slight increase to 1.57 today.  - Stop diuretics and monitor closely.     Mild cognitive impairment  -Lives independently at his own apartment with close follow up visits from  who is concerned about his ability to manage wound VAC himself.  -Social work consult and likely needs TCU placement per his mother.     Essential hypertension  - Continue prazosin.    Progressive anemia  -Progressive worsening of hemoglobin, likely multifactorial including surgical blood loss, phlebotomy, hemodilution, malnutrition and chronic disease  -Hemoglobin 7.4, will transfuse for value of <7  -AM CBC  -Type and screen ordered and consent signed     Chronic medical conditions  - Recent hip surgery.  On aspirin 325 twice daily for DVT prophylaxis.  - Morbid obesity.  On Ozempic at home, can be resumed on discharge.  - History of depression.  On Cymbalta and duloxetine.  - Bronchial asthma.  Not in exacerbation.  - History of Patel's esophagus and GERD.  Continue home regimen of PPI.  - Overactive bladder.  On oxybutynin.  - History of gout.  Was recently treated with colchicine and steroid burst.  Resumed allopurinol.  - Sleep apnea.  Continue CPAP.  - Subacute anemia.  Likely due to chronic disease, surgical/wound blood loss, and phlebotomy.  Monitor hemoglobin as patient has increased serosanguineous discharge.    DVT Prophylaxis: ASA for now per orthopedics.  Code Status: Full Code  Diet: Advance Diet as Tolerated: Regular Diet Adult    Germain Catheter: Not present  Disposition: Expected discharge in 1-2 days to TCU. Goals prior to discharge include placement.   Incidental Findings: As above.  Family updated today: No, mother not at bedside and did not answer her phone.    Medically Ready for Discharge: 1-2 days.    40 MINUTES SPENT BY ME on the date of service doing chart review, history, exam, documentation & further activities per the note.      Interval History   The patient reports doing well. No chest pain or shortness of breath. No nausea, vomiting, diarrhea, constipation. No fevers. No other specific complaints identified.     -Data reviewed today: I personally reviewed all new labs and imaging results over the last 24 hours.     Physical Exam   Temp: 98.1  F (36.7  C) Temp src: Oral BP: 127/70 Pulse: 75   Resp: 20 SpO2: 95 % O2 Device: None (Room air) Oxygen Delivery: 2 LPM  Vitals:    07/28/25 2201 07/30/25 0601 07/31/25 0500   Weight: (!) 167.4 kg (369 lb 0.8 oz) (!) 162.8 kg (358 lb 14.5 oz) (!) 163.1 kg (359 lb 9.1 oz)     Vital Signs with Ranges  Temp:  [98  F (36.7  C)-98.1  F (36.7  C)] 98.1  F (36.7  C)  Pulse:  [75-89] 75  Resp:  [16-20] 20  BP: (115-170)/(44-82) 127/70  FiO2 (%):  [21 %] 21 %  SpO2:  [93 %-97 %] 95 %  I/O last 3 completed shifts:  In: 1200 [P.O.:1200]  Out: 1850 [Urine:1850]    GENERAL: No apparent distress. Awake, alert, and fully oriented.  HEENT: Normocephalic, atraumatic. Extraocular movements intact.  CARDIOVASCULAR: Regular rate and rhythm without murmurs or rubs. No S3.  PULMONARY: Clear bilaterally.  GASTROINTESTINAL: Soft, non-tender, non-distended. Bowel sounds normoactive.   EXTREMITIES: No cyanosis or clubbing. Trace edema.  NEUROLOGICAL: CN 2-12 grossly intact, no focal neurological deficits.  DERMATOLOGICAL: No rash, ulcer, bruising, nor jaundice. Right leg wound vac in place.    Medications   Current Facility-Administered Medications   Medication Dose Route Frequency Provider Last Rate Last Admin    lactated ringers infusion   Intravenous Continuous Bala Randall  mL/hr at 07/29/25 1706 New Bag at 07/29/25 1706     Current Facility-Administered Medications   Medication Dose Route Frequency Provider Last Rate Last  Admin    acetaminophen (TYLENOL) tablet 975 mg  975 mg Oral Q8H Bala Randall MD   975 mg at 07/30/25 1559    allopurinol (ZYLOPRIM) tablet 300 mg  300 mg Oral Daily Bala Randall MD   300 mg at 07/31/25 0906    aspirin (ASA) EC tablet 325 mg  325 mg Oral BID Bala Randall MD   325 mg at 07/31/25 0904    DAPTOmycin (CUBICIN) 1,000 mg in sodium chloride 0.9 % 100 mL intermittent infusion  6 mg/kg Intravenous Q24H Thai Thao MD   1,000 mg at 07/30/25 2115    doxycycline hyclate (VIBRAMYCIN) capsule 50 mg  50 mg Oral BID Bala Randall MD   50 mg at 07/31/25 0908    DULoxetine (CYMBALTA) DR capsule 120 mg  120 mg Oral Daily Bala Randall MD   120 mg at 07/31/25 0905    famotidine (PEPCID) tablet 20 mg  20 mg Oral BID Bala Randall MD   20 mg at 07/31/25 0906    ferrous sulfate (FEROSUL) tablet 325 mg  325 mg Oral Daily with breakfast Bala Randall MD   325 mg at 07/31/25 0905    magnesium oxide (MAG-OX) half-tab 200 mg  200 mg Oral Daily Bala Randall MD   200 mg at 07/31/25 0905    mirabegron (MYRBETRIQ) 24 hr tablet 50 mg  50 mg Oral Daily Bala Randall MD   50 mg at 07/31/25 0909    OLANZapine (zyPREXA) tablet 15 mg  15 mg Oral At Bedtime Bala Randall MD   15 mg at 07/30/25 2114    oxyBUTYnin ER (DITROPAN XL) 24 hr tablet 30 mg  30 mg Oral Daily Bala Randall MD   30 mg at 07/31/25 0905    polyethylene glycol (MIRALAX) Packet 17 g  17 g Oral Daily Bala Randall MD        [Held by provider] potassium gluconate tablet 2.5 mEq  2.5 mEq Oral Daily Thai Thao MD        prazosin (MINIPRESS) capsule 3 mg  3 mg Oral At Bedtime Bala Randall MD   3 mg at 07/30/25 2114    rifampin (RIFADIN) capsule 300 mg  300 mg Oral BID Bala Randall MD   300 mg at 07/31/25 0905    senna-docusate (SENOKOT-S/PERICOLACE) 8.6-50 MG per tablet 1 tablet  1 tablet Oral BID  "Bala Randall MD   1 tablet at 07/30/25 0941    Or    senna-docusate (SENOKOT-S/PERICOLACE) 8.6-50 MG per tablet 2 tablet  2 tablet Oral BID Bala Randall MD        sodium chloride (PF) 0.9% PF flush 10-40 mL  10-40 mL Intracatheter Q7 Days Marlo Adams DO   30 mL at 07/30/25 0942    sodium chloride (PF) 0.9% PF flush 10-40 mL  10-40 mL Intracatheter Daily Marlo Adams DO   10 mL at 07/31/25 0903     Data     Laboratory:  Recent Labs   Lab 07/31/25  0623 07/30/25  0617 07/29/25  0644 07/28/25  1740   WBC 8.9  --  8.0 10.9   HGB 7.4* 7.1* 7.6* 7.7*   HCT 22.5*  --  22.6* 23.5*   MCV 86 86 85 86     --  332 396     Recent Labs   Lab 07/31/25  0623 07/30/25  1250 07/30/25  0617 07/30/25  0606 07/29/25  1344 07/29/25  0644    140  --   --   --  142   POTASSIUM 3.5 3.6 3.8  --   --  4.0   CHLORIDE 102 102  --   --   --  108*   CO2 28 28  --   --   --  27   ANIONGAP 11 10  --   --   --  7   GLC 84 87  --  90   < > 106*   BUN 10.3 10.4  --   --   --  10.6   CR 1.57* 1.48*  --   --   --  1.47*   GFRESTIMATED 56* 60*  --   --   --  60*   FILEMON 8.9 8.7*  --   --   --  9.1    < > = values in this interval not displayed.     No results for input(s): \"CULT\" in the last 168 hours.    Imaging:  No results found for this or any previous visit (from the past 24 hours).        Marlo Adams DO [facility]  [address] [place] [address]  07/31/2025   "

## 2025-07-31 NOTE — PROGRESS NOTES
Care Management Follow Up    Length of Stay (days): 3    Expected Discharge Date: 08/01/2025     Concerns to be Addressed:       Patient plan of care discussed at interdisciplinary rounds: Yes    Anticipated Discharge Disposition: Transitional Care     Patient/family educated on Medicare website which has current facility and service quality ratings: yes  Education Provided on the Discharge Plan:    Patient/Family in Agreement with the Plan: yes    Referrals Placed by CM/SW: Post Acute Facilities  Private pay costs discussed: Not applicable    Discussed  Partnership in Safe Discharge Planning  document with patient/family: No     Handoff Completed: Yes, [facility] PCP: Internal handoff referral completed    Additional Information:  Met with pt, pt's mother/guardian and Wilkins CM at bedside to discuss discharge planning. TCU referrals are pending. [facility] is considering, however Daptomycin may be a barrier due to cost. [facility] is also reviewing and considering.  Pt has a Prevena vac attached to a hospital VAC unit and will need to be attached to the Prevena unit when pt is ready to discharge.    Pt will trigger an OBRA Level 2, Deborah Hernandez from [place] has begun the assessment and once a TCU has been secured, Deborah would need to be notified of the facility to complete and submit to the county. Contact information: [email or web address]    P: [phone] or [phone].    Next Steps: Follow up on TCU referrals    Bonny Herrera RN   Inpatient Care Coordination  [facility]   Phone: [phone]

## 2025-07-31 NOTE — PROGRESS NOTES
"   07/31/25 9635   Appointment Info   Signing Clinician's Name / Credentials (PT) Nuris Amato, PT   Quick Adds   Quick Adds Edema Eval   Living Environment   People in Home alone   Current Living Arrangements apartment   Home Accessibility stairs to enter home   Number of Stairs, Main Entrance 3   Stair Railings, Main Entrance railings on both sides of stairs   Transportation Anticipated family or friend will provide   Self-Care   Usual Activity Tolerance good   Current Activity Tolerance moderate   Equipment Currently Used at Home cane, straight;walker, rolling;grab bar, toilet;grab bar, tub/shower   Fall history within last six months yes   Number of times patient has fallen within last six months 2   Activity/Exercise/Self-Care Comment per chart: \"Pt reports ind w/ ADLs at baseline. PCA assists w/ socks. Pt has SPC, FWW, 4WW for use as needed\"   General Information   Onset of Illness/Injury or Date of Surgery 07/28/25   Referring Physician Marlo Adams,    Patient/Family Therapy Goals Statement (PT) wanting TCU for medical management needs   Pertinent History of Current Problem (include personal factors and/or comorbidities that impact the POC) Kimo Greenwood is a 44 year old male admitted on 7/28/2025.  He is a morbidly obese male with a mild cognitive impairment who had right hip arthroplasty in April 2025 which was complicated by surgical site infection.  He had I&D and revision of had an acetabular liner and OR cultures grew MRSA.  He was discharged 7/28 with a right hip wound VAC and IV daptomycin at home.     When he reached home, his care manager noticed that the wound VAC was full of serosanguineous discharge.  The wound VAC was changed and he fell up 3 containers in quick succession and was therefore brought back to ER.  His care manager also had some concerns about his ability to take care of the wound VAC by himself.     He was otherwise afebrile and hemodynamically stable, rather high " systolic blood pressure in 180s.  He was lying comfortably on the bed and eating a sandwich.  Had hemoglobin of 7.7 which is actually better than 7.65 days ago. BUN/creatinine of 11.3/1.4.  CT of the right hip showed marked interval decrease in the fluid and gas collection along the right hip surgical track.     He was seen by orthopedics and plan is for evacuation of hematoma and wound closure in the OR.  ID has also been consulted.  Will continue to work with PT/OT and at this point the plan is for discharge to TCU.; see medical record for further information   Existing Precautions/Restrictions weight bearing;fall   Weight-Bearing Status - RLE weight-bearing as tolerated   Cognition   Affect/Mental Status (Cognition) flat/blunted affect   Orientation Status (Cognition) oriented x 4   Follows Commands (Cognition) WFL   Pain Assessment   Patient Currently in Pain Yes, see Vital Sign flowsheet  (reports 8/10 in R hip)   Integumentary/Edema   Integumentary/Edema other (describe)   Integumentary/Edema Comments pitting edema noted on dorsum of bilateral feet   Interventions   Interventions Quick Adds Gait Training   Gait Training   Gait Training Minutes (32604) 10   Symptoms Noted During/After Treatment (Gait Training) fatigue   Treatment Detail/Skilled Intervention Pt sitting up in bedside recliner following lymph wrapping. Facilitated sit<>stand with SBA and 2WW. Note wound vac on R posteriolateral hip. Nurse notified of potential leak on wound vac site likely due to pt position in chair prior to session; hissing sound observed. Facilitated 160 feet with 2WW and graded assist from CGA to SBA. Observable over pronation of bilateral feet with flat foot contact and lateral trunk excursion with each step. No safety cuing required. Noticable fatigue but no increase in pain symptoms. Pt ending session back on recliner with all needs in reach and chair alarm set. Nurse in room.   Distance in Feet 160   Mobile Level  (Gait Training) stand-by assist   Manual Therapy   Manual Therapy Minutes (60884) 40   Symptoms Noted During/After Treatment None   Treatment Detail/Skilled Intervention PT: Lymphedema evaluation complete and treatment initiated.  Pt demonstrates edema in LE's  below knee.  Pt is appropriate for LE quick wrap, using short stretch (SS) bandages, not ACE wraps. Pt educated in rationale for compression with wound healing and importance of using SS bandages which have low resting pressure and high working pressure, vs ACE wraps which have high resting pressure and low working pressure. LEs washed, lotion applied, and quick wraps completed to B LE with 8 cm short stretch bandage toes to ankle and 12 cm short stretch bandage ankle to knee with appropriate stretch and spacing to allow gradient compression.  Wraps applied bilaterally. Use of ABD pads at junction of ankle and tibia; if wraps become loose or are otherwise not tolerated, patient instructed to have nurse remove them If   wraps are removed, please  keep LEs elevated. Patient appears to be tolerating wraps well during session.   PT Discharge Planning   PT Plan Increase activity tolerance and ambulation distance. Review HEP; rewrap as appropriate; patient to have family bring in Velcro wraps   PT Discharge Recommendation (DC Rec) Transitional Care Facility;home with assist;home with home care physical therapy   PT Rationale for DC Rec Pt presenting below baseline, requiring SBA with 2WW for ambulation. Lives alone in apartment but has good social support near by. Presenting with decreased activity tolerance. Recommend TCU to address mobility deficits, increase strength and ROM, and increase ambulation.; Was requiring further assist for medical   PT Brief overview of current status Ax1 with 2WW   PT Total Distance Amb During Session (feet) 160   Physical Therapy Time and Intention   Timed Code Treatment Minutes 50   Total Session Time (sum of timed and untimed  services) 50

## 2025-07-31 NOTE — PROGRESS NOTES
[facility]    Infectious Disease Progress Note    Date of Service (when I saw the patient): 07/31/2025     Assessment & Plan   Kimo Greenwood is a 44 year old male who was admitted on 7/19/2025.     Impression:  43 yo male with history of a right total hip arthroplasty in April 2025.    Approx 10 days ago he fell and began developing right hip pain and swelling.    He was seen in urgent care and ER visits on multiple occasions and eventually was placed on oral antibiotics.    With continued pain and swelling presented to the emergency room 2 days ago and was admitted for possibly infected right hip wound.    S/p I and D and combined, with revision of head and acetabular liner   OR culture with staph aureus MRSA, 1 culture with staph epi but clearly MRSA the pathogen here  MRSA PCR Positive      Recommendations   Continue daptomycin at purposely high dose of 1000 mg, by lean body weight this is about 8 to 10 mg/kg which is the desired dose here, still suspect creatinine will improve and make that the correct dose  Continue rifampin which he is tolerating okay  Still risk for that this will fail but nothing to suggest that currently so plan on full course of IV treatment followed by extended oral  Doing okay, of some interest creatinine still elevated note baseline has been elevated as well,      Didier Andrade MD    Interval History   readmitted with drainage and possible infected seroma, discussed with Dr. Randall not obvious infection fully drained now creatinine stable      Physical Exam   Temp: 98.1  F (36.7  C) Temp src: Oral BP: 127/70 Pulse: 75   Resp: 20 SpO2: 95 % O2 Device: None (Room air) Oxygen Delivery: 2 LPM  Vitals:    07/28/25 2201 07/30/25 0601 07/31/25 0500   Weight: (!) 167.4 kg (369 lb 0.8 oz) (!) 162.8 kg (358 lb 14.5 oz) (!) 163.1 kg (359 lb 9.1 oz)     Vital Signs with Ranges  Temp:  [98  F (36.7  C)-98.1  F (36.7  C)] 98.1  F (36.7  C)  Pulse:  [75-89] 75  Resp:   [16-20] 20  BP: (115-170)/(44-82) 127/70  FiO2 (%):  [21 %] 21 %  SpO2:  [93 %-97 %] 95 %      Medications   Current Facility-Administered Medications   Medication Dose Route Frequency Provider Last Rate Last Admin    lactated ringers infusion   Intravenous Continuous Bala Randall  mL/hr at 07/29/25 1706 New Bag at 07/29/25 1706     Current Facility-Administered Medications   Medication Dose Route Frequency Provider Last Rate Last Admin    acetaminophen (TYLENOL) tablet 975 mg  975 mg Oral Q8H Bala Randall MD   975 mg at 07/30/25 1559    allopurinol (ZYLOPRIM) tablet 300 mg  300 mg Oral Daily Bala Randall MD   300 mg at 07/31/25 0906    aspirin (ASA) EC tablet 325 mg  325 mg Oral BID Bala Randall MD   325 mg at 07/31/25 0904    DAPTOmycin (CUBICIN) 1,000 mg in sodium chloride 0.9 % 100 mL intermittent infusion  6 mg/kg Intravenous Q24H Thai Thao MD   1,000 mg at 07/30/25 2115    doxycycline hyclate (VIBRAMYCIN) capsule 50 mg  50 mg Oral BID Bala Randall MD   50 mg at 07/31/25 0908    DULoxetine (CYMBALTA) DR capsule 120 mg  120 mg Oral Daily Bala Randall MD   120 mg at 07/31/25 0905    famotidine (PEPCID) tablet 20 mg  20 mg Oral BID Bala Randall MD   20 mg at 07/31/25 0906    ferrous sulfate (FEROSUL) tablet 325 mg  325 mg Oral Daily with breakfast Bala Randall MD   325 mg at 07/31/25 0905    magnesium oxide (MAG-OX) half-tab 200 mg  200 mg Oral Daily Bala Randall MD   200 mg at 07/31/25 0905    mirabegron (MYRBETRIQ) 24 hr tablet 50 mg  50 mg Oral Daily Bala Randall MD   50 mg at 07/31/25 0909    OLANZapine (zyPREXA) tablet 15 mg  15 mg Oral At Bedtime Bala Randall MD   15 mg at 07/30/25 2114    oxyBUTYnin ER (DITROPAN XL) 24 hr tablet 30 mg  30 mg Oral Daily Bala Randall MD   30 mg at 07/31/25 0905    polyethylene glycol (MIRALAX) Packet 17 g  17 g  "Oral Daily Bala Randall MD        [Held by provider] potassium gluconate tablet 2.5 mEq  2.5 mEq Oral Daily Thai Thao MD        prazosin (MINIPRESS) capsule 3 mg  3 mg Oral At Bedtime Bala Randall MD   3 mg at 07/30/25 2114    rifampin (RIFADIN) capsule 300 mg  300 mg Oral BID Bala Randall MD   300 mg at 07/31/25 0905    senna-docusate (SENOKOT-S/PERICOLACE) 8.6-50 MG per tablet 1 tablet  1 tablet Oral BID Bala Randall MD   1 tablet at 07/30/25 0941    Or    senna-docusate (SENOKOT-S/PERICOLACE) 8.6-50 MG per tablet 2 tablet  2 tablet Oral BID Bala Randall MD        sodium chloride (PF) 0.9% PF flush 10-40 mL  10-40 mL Intracatheter Q7 Days Marlo Adams DO   30 mL at 07/30/25 0942    sodium chloride (PF) 0.9% PF flush 10-40 mL  10-40 mL Intracatheter Daily Marlo Adams, DO   10 mL at 07/31/25 0903       Data   All microbiology laboratory data reviewed.  Recent Labs   Lab Test 07/31/25  0623 07/30/25  0617 07/29/25  0644 07/28/25  1740   WBC 8.9  --  8.0 10.9   HGB 7.4* 7.1* 7.6* 7.7*   HCT 22.5*  --  22.6* 23.5*   MCV 86 86 85 86     --  332 396     Recent Labs   Lab Test 07/31/25  0623 07/30/25  1250 07/29/25  0644   CR 1.57* 1.48* 1.47*     Recent Labs   Lab Test 07/19/25  1431   SED 76*     Recent Labs   Lab Test 06/24/19  0844 06/24/19  0843   CULT No anaerobes isolated  No anaerobes isolated  No growth  No growth No anaerobes isolated  No growth       All cultures:  No results for input(s): \"CULTURE\" in the last 168 hours.     Blood culture:  Results for orders placed or performed during the hospital encounter of 07/19/25   Blood Culture Peripheral blood (BC) Hand, Left    Collection Time: 07/19/25  4:15 PM    Specimen: Hand, Left; Peripheral blood (BC)   Result Value Ref Range    Culture No Growth    Blood Culture Peripheral blood (BC) Arm, Right    Collection Time: 07/19/25  2:32 PM    Specimen: Arm, Right; Peripheral " blood (BC)   Result Value Ref Range    Culture No Growth    Results for orders placed or performed during the hospital encounter of 05/23/25   Blood Culture Peripheral blood (BC) Arm, Right    Collection Time: 05/23/25  9:18 PM    Specimen: Arm, Right; Peripheral blood (BC)   Result Value Ref Range    Culture No Growth      *Note: Due to a large number of results and/or encounters for the requested time period, some results have not been displayed. A complete set of results can be found in Results Review.      Urine culture:  No results found. However, due to the size of the patient record, not all encounters were searched. Please check Results Review for a complete set of results.

## 2025-07-31 NOTE — PLAN OF CARE
"VSS on RA. A&Ox4. Given PRN Dilaudid for R hip pain. Denies SOB. LS: diminished. K and Mg protocol- both AM rechecks. PICC patent. BLE edema. Lymphedema consulted. Ortho following. SBA w/cane. Discharge TBD.    Goal Outcome Evaluation:      Plan of Care Reviewed With: patient    Overall Patient Progress: improving    Outcome Evaluation: Continues to have R hip pain. On Abx.    Problem: Adult Inpatient Plan of Care  Goal: Plan of Care Review  Description: The Plan of Care Review/Shift note should be completed every shift.  The Outcome Evaluation is a brief statement about your assessment that the patient is improving, declining, or no change.  This information will be displayed automatically on your shift  note.  Outcome: Progressing  Flowsheets (Taken 7/31/2025 1350)  Outcome Evaluation: Continues to have R hip pain. On Abx.  Plan of Care Reviewed With: patient  Overall Patient Progress: improving  Goal: Patient-Specific Goal (Individualized)  Description: You can add care plan individualizations to a care plan. Examples of Individualization might be:  \"Parent requests to be called daily at 9am for status\", \"I have a hard time hearing out of my right ear\", or \"Do not touch me to wake me up as it startles  me\".  Outcome: Progressing  Goal: Absence of Hospital-Acquired Illness or Injury  Outcome: Progressing  Intervention: Identify and Manage Fall Risk  Recent Flowsheet Documentation  Taken 7/31/2025 0853 by Yadi Leonard RN  Safety Promotion/Fall Prevention:   activity supervised   assistive device/personal items within reach   clutter free environment maintained   increased rounding and observation   increase visualization of patient   lighting adjusted   mobility aid in reach   nonskid shoes/slippers when out of bed   patient and family education   room organization consistent   safety round/check completed   treat reversible contributory factors   treat underlying cause  Intervention: Prevent Skin " Injury  Recent Flowsheet Documentation  Taken 7/31/2025 0859 by Yadi Leonard RN  Body Position: position changed independently  Intervention: Prevent and Manage VTE (Venous Thromboembolism) Risk  Recent Flowsheet Documentation  Taken 7/31/2025 0859 by Yadi Leonard RN  VTE Prevention/Management: patient refused intervention  Intervention: Prevent Infection  Recent Flowsheet Documentation  Taken 7/31/2025 0859 by Yadi Leonard RN  Infection Prevention:   equipment surfaces disinfected   hand hygiene promoted   personal protective equipment utilized   rest/sleep promoted   single patient room provided  Goal: Optimal Comfort and Wellbeing  Outcome: Progressing  Intervention: Monitor Pain and Promote Comfort  Recent Flowsheet Documentation  Taken 7/31/2025 1202 by Yadi Leonard RN  Pain Management Interventions: medication (see MAR)  Goal: Readiness for Transition of Care  Outcome: Progressing     Problem: Infection  Goal: Absence of Infection Signs and Symptoms  Outcome: Progressing  Intervention: Prevent or Manage Infection  Recent Flowsheet Documentation  Taken 7/31/2025 0859 by Yadi Leonard RN  Isolation Precautions: contact precautions maintained     Problem: Pain Acute  Goal: Optimal Pain Control and Function  Outcome: Progressing  Intervention: Develop Pain Management Plan  Recent Flowsheet Documentation  Taken 7/31/2025 1202 by Yadi Leonard RN  Pain Management Interventions: medication (see MAR)  Intervention: Prevent or Manage Pain  Recent Flowsheet Documentation  Taken 7/31/2025 0859 by Yadi Leonard RN  Medication Review/Management: medications reviewed

## 2025-07-31 NOTE — PLAN OF CARE
"Vitals are Temp: 98  F (36.7  C) Temp src: Axillary BP: 115/44 Pulse: 86   Resp: 18 SpO2: 93 %.  Patient is Alert and Oriented x4. They are independent.Pt is a Regular diet.  They are complaining of 2/10 pain in their rt hip .  Oxycodone given for pain.  Patient is Saline locked. Wound vac to rt hip @125. Picc single lumen. Contact mrsa. POD 1. Awaiting placement to tcu. K&mg in am. CPAP overnight.    Goal Outcome Evaluation:      Plan of Care Reviewed With: patient    Overall Patient Progress: improvingOverall Patient Progress: improving         Problem: Adult Inpatient Plan of Care  Goal: Plan of Care Review  Description: The Plan of Care Review/Shift note should be completed every shift.  The Outcome Evaluation is a brief statement about your assessment that the patient is improving, declining, or no change.  This information will be displayed automatically on your shift  note.  Outcome: Progressing  Flowsheets (Taken 7/31/2025 2527)  Plan of Care Reviewed With: patient  Overall Patient Progress: improving  Goal: Patient-Specific Goal (Individualized)  Description: You can add care plan individualizations to a care plan. Examples of Individualization might be:  \"Parent requests to be called daily at 9am for status\", \"I have a hard time hearing out of my right ear\", or \"Do not touch me to wake me up as it startles  me\".  Outcome: Progressing  Goal: Absence of Hospital-Acquired Illness or Injury  Outcome: Progressing  Intervention: Identify and Manage Fall Risk  Recent Flowsheet Documentation  Taken 7/30/2025 1940 by Rachelle Hudson RN  Safety Promotion/Fall Prevention:   activity supervised   nonskid shoes/slippers when out of bed   safety round/check completed  Intervention: Prevent Skin Injury  Recent Flowsheet Documentation  Taken 7/30/2025 1940 by Rachelle Hudson RN  Body Position: position changed independently  Intervention: Prevent and Manage VTE (Venous Thromboembolism) Risk  Recent " Flowsheet Documentation  Taken 7/30/2025 1940 by Rachelle Hudson RN  VTE Prevention/Management:   SCDs off (sequential compression devices)   patient refused intervention  Intervention: Prevent Infection  Recent Flowsheet Documentation  Taken 7/30/2025 1940 by Rachelle Hudson RN  Infection Prevention:   hand hygiene promoted   personal protective equipment utilized   rest/sleep promoted  Goal: Optimal Comfort and Wellbeing  Outcome: Progressing  Goal: Readiness for Transition of Care  Outcome: Progressing     Problem: Infection  Goal: Absence of Infection Signs and Symptoms  Outcome: Progressing  Intervention: Prevent or Manage Infection  Recent Flowsheet Documentation  Taken 7/30/2025 1940 by Rachelle Hudson RN  Isolation Precautions: contact precautions maintained     Problem: Pain Acute  Goal: Optimal Pain Control and Function  Outcome: Progressing  Intervention: Prevent or Manage Pain  Recent Flowsheet Documentation  Taken 7/30/2025 1940 by Rachelle Hudson RN  Medication Review/Management: medications reviewed

## 2025-08-01 ENCOUNTER — APPOINTMENT (OUTPATIENT)
Dept: OCCUPATIONAL THERAPY | Facility: CLINIC | Age: 45
DRG: 857 | End: 2025-08-01
Payer: COMMERCIAL

## 2025-08-01 ENCOUNTER — APPOINTMENT (OUTPATIENT)
Dept: PHYSICAL THERAPY | Facility: CLINIC | Age: 45
DRG: 857 | End: 2025-08-01
Payer: COMMERCIAL

## 2025-08-01 LAB
ANION GAP SERPL CALCULATED.3IONS-SCNC: 11 MMOL/L (ref 7–15)
BUN SERPL-MCNC: 11.5 MG/DL (ref 6–20)
CALCIUM SERPL-MCNC: 9.1 MG/DL (ref 8.8–10.4)
CHLORIDE SERPL-SCNC: 104 MMOL/L (ref 98–107)
CREAT SERPL-MCNC: 1.58 MG/DL (ref 0.67–1.17)
EGFRCR SERPLBLD CKD-EPI 2021: 55 ML/MIN/1.73M2
ERYTHROCYTE [DISTWIDTH] IN BLOOD BY AUTOMATED COUNT: 14.7 % (ref 10–15)
FERRITIN SERPL-MCNC: 213 NG/ML (ref 31–409)
FOLATE SERPL-MCNC: 5.1 NG/ML (ref 4.6–34.8)
GLUCOSE BLDC GLUCOMTR-MCNC: 106 MG/DL (ref 70–99)
GLUCOSE BLDC GLUCOMTR-MCNC: 96 MG/DL (ref 70–99)
GLUCOSE SERPL-MCNC: 88 MG/DL (ref 70–99)
HCO3 SERPL-SCNC: 28 MMOL/L (ref 22–29)
HCT VFR BLD AUTO: 22.9 % (ref 40–53)
HGB BLD-MCNC: 7.3 G/DL (ref 13.3–17.7)
IRON BINDING CAPACITY (ROCHE): 194 UG/DL (ref 240–430)
IRON SATN MFR SERPL: 36 % (ref 15–46)
IRON SERPL-MCNC: 69 UG/DL (ref 61–157)
MAGNESIUM SERPL-MCNC: 1.8 MG/DL (ref 1.7–2.3)
MCH RBC QN AUTO: 27.9 PG (ref 26.5–33)
MCHC RBC AUTO-ENTMCNC: 31.9 G/DL (ref 31.5–36.5)
MCV RBC AUTO: 87 FL (ref 78–100)
PLATELET # BLD AUTO: 331 10E3/UL (ref 150–450)
POTASSIUM SERPL-SCNC: 3.5 MMOL/L (ref 3.4–5.3)
RBC # BLD AUTO: 2.62 10E6/UL (ref 4.4–5.9)
SODIUM SERPL-SCNC: 143 MMOL/L (ref 135–145)
VIT B12 SERPL-MCNC: 331 PG/ML (ref 232–1245)
WBC # BLD AUTO: 7.3 10E3/UL (ref 4–11)

## 2025-08-01 PROCEDURE — 82728 ASSAY OF FERRITIN: CPT | Performed by: HOSPITALIST

## 2025-08-01 PROCEDURE — 85027 COMPLETE CBC AUTOMATED: CPT | Performed by: HOSPITALIST

## 2025-08-01 PROCEDURE — 250N000013 HC RX MED GY IP 250 OP 250 PS 637: Performed by: ORTHOPAEDIC SURGERY

## 2025-08-01 PROCEDURE — 82746 ASSAY OF FOLIC ACID SERUM: CPT | Performed by: HOSPITALIST

## 2025-08-01 PROCEDURE — 99232 SBSQ HOSP IP/OBS MODERATE 35: CPT | Performed by: HOSPITALIST

## 2025-08-01 PROCEDURE — 97535 SELF CARE MNGMENT TRAINING: CPT | Mod: GO

## 2025-08-01 PROCEDURE — 97140 MANUAL THERAPY 1/> REGIONS: CPT | Mod: GP | Performed by: PHYSICAL THERAPIST

## 2025-08-01 PROCEDURE — 250N000011 HC RX IP 250 OP 636: Performed by: STUDENT IN AN ORGANIZED HEALTH CARE EDUCATION/TRAINING PROGRAM

## 2025-08-01 PROCEDURE — 250N000013 HC RX MED GY IP 250 OP 250 PS 637: Performed by: HOSPITALIST

## 2025-08-01 PROCEDURE — 82607 VITAMIN B-12: CPT | Performed by: HOSPITALIST

## 2025-08-01 PROCEDURE — 83735 ASSAY OF MAGNESIUM: CPT | Performed by: HOSPITALIST

## 2025-08-01 PROCEDURE — 80048 BASIC METABOLIC PNL TOTAL CA: CPT | Performed by: HOSPITALIST

## 2025-08-01 PROCEDURE — 83550 IRON BINDING TEST: CPT | Performed by: HOSPITALIST

## 2025-08-01 PROCEDURE — 120N000001 HC R&B MED SURG/OB

## 2025-08-01 PROCEDURE — 999N000157 HC STATISTIC RCP TIME EA 10 MIN

## 2025-08-01 PROCEDURE — 94660 CPAP INITIATION&MGMT: CPT

## 2025-08-01 PROCEDURE — 258N000003 HC RX IP 258 OP 636: Performed by: STUDENT IN AN ORGANIZED HEALTH CARE EDUCATION/TRAINING PROGRAM

## 2025-08-01 PROCEDURE — 99232 SBSQ HOSP IP/OBS MODERATE 35: CPT | Performed by: INTERNAL MEDICINE

## 2025-08-01 RX ADMIN — Medication 5 MG: at 22:13

## 2025-08-01 RX ADMIN — HYDROMORPHONE HYDROCHLORIDE 2 MG: 2 TABLET ORAL at 08:46

## 2025-08-01 RX ADMIN — ACETAMINOPHEN 975 MG: 325 TABLET ORAL at 00:11

## 2025-08-01 RX ADMIN — ASPIRIN 325 MG: 325 TABLET, COATED ORAL at 08:45

## 2025-08-01 RX ADMIN — FAMOTIDINE 20 MG: 20 TABLET, FILM COATED ORAL at 08:45

## 2025-08-01 RX ADMIN — DAPTOMYCIN 1000 MG: 500 INJECTION, POWDER, LYOPHILIZED, FOR SOLUTION INTRAVENOUS at 22:13

## 2025-08-01 RX ADMIN — FAMOTIDINE 20 MG: 20 TABLET, FILM COATED ORAL at 21:09

## 2025-08-01 RX ADMIN — DULOXETINE 120 MG: 60 CAPSULE, DELAYED RELEASE ORAL at 08:45

## 2025-08-01 RX ADMIN — ACETAMINOPHEN 975 MG: 325 TABLET ORAL at 08:45

## 2025-08-01 RX ADMIN — PRAZOSIN HYDROCHLORIDE 3 MG: 1 CAPSULE ORAL at 22:13

## 2025-08-01 RX ADMIN — ASPIRIN 325 MG: 325 TABLET, COATED ORAL at 21:09

## 2025-08-01 RX ADMIN — FERROUS SULFATE TAB 325 MG (65 MG ELEMENTAL FE) 325 MG: 325 (65 FE) TAB at 08:45

## 2025-08-01 RX ADMIN — Medication 200 MG: at 08:45

## 2025-08-01 RX ADMIN — OXYBUTYNIN CHLORIDE 30 MG: 5 TABLET, EXTENDED RELEASE ORAL at 08:46

## 2025-08-01 RX ADMIN — RIFAMPIN 300 MG: 300 CAPSULE ORAL at 21:09

## 2025-08-01 RX ADMIN — ALLOPURINOL 300 MG: 300 TABLET ORAL at 08:45

## 2025-08-01 RX ADMIN — RIFAMPIN 300 MG: 300 CAPSULE ORAL at 08:45

## 2025-08-01 RX ADMIN — DOXYCYCLINE HYCLATE 50 MG: 50 CAPSULE ORAL at 21:09

## 2025-08-01 RX ADMIN — HYDROMORPHONE HYDROCHLORIDE 2 MG: 2 TABLET ORAL at 00:26

## 2025-08-01 RX ADMIN — OLANZAPINE 15 MG: 10 TABLET, FILM COATED ORAL at 22:12

## 2025-08-01 RX ADMIN — MIRABEGRON 50 MG: 50 TABLET, EXTENDED RELEASE ORAL at 08:50

## 2025-08-01 RX ADMIN — DOXYCYCLINE HYCLATE 50 MG: 50 CAPSULE ORAL at 08:50

## 2025-08-01 RX ADMIN — HYDROMORPHONE HYDROCHLORIDE 2 MG: 2 TABLET ORAL at 16:09

## 2025-08-01 RX ADMIN — SENNOSIDES AND DOCUSATE SODIUM 1 TABLET: 50; 8.6 TABLET ORAL at 08:45

## 2025-08-01 ASSESSMENT — ACTIVITIES OF DAILY LIVING (ADL)
ADLS_ACUITY_SCORE: 42
ADLS_ACUITY_SCORE: 43
ADLS_ACUITY_SCORE: 42
ADLS_ACUITY_SCORE: 43
ADLS_ACUITY_SCORE: 42
ADLS_ACUITY_SCORE: 43
ADLS_ACUITY_SCORE: 43
ADLS_ACUITY_SCORE: 42
ADLS_ACUITY_SCORE: 43
ADLS_ACUITY_SCORE: 43
ADLS_ACUITY_SCORE: 42
ADLS_ACUITY_SCORE: 42
ADLS_ACUITY_SCORE: 43
ADLS_ACUITY_SCORE: 42
ADLS_ACUITY_SCORE: 43

## 2025-08-01 NOTE — PROGRESS NOTES
Care Management Follow Up    Length of Stay (days): 4    Expected Discharge Date: 08/04/2025     Concerns to be Addressed:       Patient plan of care discussed at interdisciplinary rounds: Yes    Anticipated Discharge Disposition: Transitional Care    Patient/family educated on Medicare website which has current facility and service quality ratings: yes  Education Provided on the Discharge Plan:    Patient/Family in Agreement with the Plan: yes    Referrals Placed by CM/SW: Post Acute Facilities  Private pay costs discussed: transportation costs    Discussed  Partnership in Safe Discharge Planning  document with patient/family: No     Handoff Completed: Yes, MONICA PCP: Internal handoff referral completed    Additional Information:  [facility] has accepted patient for TCU placement. Per MD patient medically ready tomorrow. Updated TCU and they requested transport around 1500.     Set up stretcher transport for tomorrow between [id number]. Stretcher required due to weight limit for wheelchair transport. Updated [facility] and care team of transport time.     PAS completed. Updated  of accepting facility for her to send the OBRA assessment to. She will send to them and the county.     Updated patient and family at bedside of transport time.     Next Steps: complete PCS form, fax orders    Carol Bradford RN  Care Coordinator  [facility]

## 2025-08-01 NOTE — PROGRESS NOTES
[facility]    Hospitalist Progress Note  Name: Kimo Greenwood    MRN: [id number]  Provider:  Marlo Adams DO MPH  Date of Service: 08/01/2025    Summary of Stay: Kimo Greenwood is a 44 year old male admitted on 7/28/2025.  He is a morbidly obese male with a mild cognitive impairment who had right hip arthroplasty in April 2025 which was complicated by surgical site infection.  He had I&D and revision of had an acetabular liner and OR cultures grew MRSA.  He was discharged 7/28 with a right hip wound VAC and IV daptomycin at home.     When he reached home, his care manager noticed that the wound VAC was full of serosanguineous discharge.  The wound VAC was changed and he fell up 3 containers in quick succession and was therefore brought back to ER.  His care manager also had some concerns about his ability to take care of the wound VAC by himself.     He was otherwise afebrile and hemodynamically stable, rather high systolic blood pressure in 180s.  He was lying comfortably on the bed and eating a sandwich.  Had hemoglobin of 7.7 which is actually better than 7.65 days ago. BUN/creatinine of 11.3/1.4.  CT of the right hip showed marked interval decrease in the fluid and gas collection along the right hip surgical track.    He was seen by orthopedics and plan is for evacuation of hematoma and wound closure in the OR.  ID has also been consulted.  Will continue to work with PT/OT and at this point the plan is for discharge to TCU.     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  Hemoglobin is stable.  Check venous ultrasound bilateral lower extremities.  -Stopped IV Lasix 7/31 given slight increase in creatinine.  Elevate bilateral  lower extremities and wraps.  Lymphedema consult.  -Continue daptomycin and rifampin.    -Consulted ID and orthopedics.  -Evacuation of hematoma and wound VAC closure in the OR 7/29 with Dr. Randall.     Acute kidney injury  - Appears to plateau around 1.4 with slight increase the past few days but overall stable.  - Hold diuretics and monitor closely.     Mild cognitive impairment  -Lives independently at his own apartment with close follow up visits from  who is concerned about his ability to manage wound VAC himself.  -Social work consult and likely needs TCU placement per his mother.     Essential hypertension  - Continue prazosin.    Progressive anemia  -Progressive worsening of hemoglobin, likely multifactorial including surgical blood loss, phlebotomy, hemodilution, malnutrition and chronic disease  -Hemoglobin 7.4, will transfuse for value of <7  -AM CBC  -Type and screen ordered and consent signed     Chronic medical conditions  - Recent hip surgery.  On aspirin 325 twice daily for DVT prophylaxis.  - Morbid obesity.  On Ozempic at home, can be resumed on discharge.  - History of depression.  On Cymbalta and duloxetine.  - Bronchial asthma.  Not in exacerbation.  - History of Patel's esophagus and GERD.  Continue home regimen of PPI.  - Overactive bladder.  On oxybutynin.  - History of gout.  Was recently treated with colchicine and steroid burst.  Resumed allopurinol.  - Sleep apnea.  Continue CPAP.  - Subacute anemia.  Likely due to chronic disease, surgical/wound blood loss, and phlebotomy.  Monitor hemoglobin as patient has increased serosanguineous discharge.    DVT Prophylaxis: ASA for now per orthopedics.  Code Status: Full Code  Diet: Advance Diet as Tolerated: Regular Diet Adult    Germain Catheter: Not present  Disposition: Expected discharge in 1-2 days to TCU. Goals prior to discharge include placement. Medically stable for discharge.  Incidental Findings: As above.  Family updated  today: No, mother not at bedside.   Medically Ready for Discharge: Ready now.    40 MINUTES SPENT BY ME on the date of service doing chart review, history, exam, documentation & further activities per the note.      Interval History   The patient reports doing well. No chest pain or shortness of breath. No nausea, vomiting, diarrhea, constipation. No fevers. No other specific complaints identified.     -Data reviewed today: I personally reviewed all new labs and imaging results over the last 24 hours.     Physical Exam   Temp: 98.1  F (36.7  C) Temp src: Oral BP: 111/51 Pulse: 80   Resp: 16 SpO2: 96 % O2 Device: BiPAP/CPAP    Vitals:    07/30/25 0601 07/31/25 0500 08/01/25 0633   Weight: (!) 162.8 kg (358 lb 14.5 oz) (!) 163.1 kg (359 lb 9.1 oz) (!) 164.1 kg (361 lb 12.8 oz)     Vital Signs with Ranges  Temp:  [97.9  F (36.6  C)-98.1  F (36.7  C)] 98.1  F (36.7  C)  Pulse:  [80-88] 80  Resp:  [16-18] 16  BP: (111-140)/(51-67) 111/51  SpO2:  [94 %-98 %] 96 %  I/O last 3 completed shifts:  In: 1350 [P.O.:1340; I.V.:10]  Out: 1600 [Urine:1600]    GENERAL: No apparent distress. Awake, alert, and fully oriented.  HEENT: Normocephalic, atraumatic. Extraocular movements intact.  CARDIOVASCULAR: Regular rate and rhythm without murmurs or rubs. No S3.  PULMONARY: Clear bilaterally.  GASTROINTESTINAL: Soft, non-tender, non-distended. Bowel sounds normoactive.   EXTREMITIES: No cyanosis or clubbing. Trace edema.  NEUROLOGICAL: CN 2-12 grossly intact, no focal neurological deficits.  DERMATOLOGICAL: No rash, ulcer, bruising, nor jaundice. Right leg wound vac in place.    Medications   Current Facility-Administered Medications   Medication Dose Route Frequency Provider Last Rate Last Admin    lactated ringers infusion   Intravenous Continuous Bala Randall  mL/hr at 07/29/25 1706 New Bag at 07/29/25 1706     Current Facility-Administered Medications   Medication Dose Route Frequency Provider Last Rate Last  Admin    allopurinol (ZYLOPRIM) tablet 300 mg  300 mg Oral Daily Bala Randall MD   300 mg at 08/01/25 0845    aspirin (ASA) EC tablet 325 mg  325 mg Oral BID Bala Randall MD   325 mg at 08/01/25 0845    DAPTOmycin (CUBICIN) 1,000 mg in sodium chloride 0.9 % 100 mL intermittent infusion  6 mg/kg Intravenous Q24H Thai Thao MD   1,000 mg at 07/31/25 2133    doxycycline hyclate (VIBRAMYCIN) capsule 50 mg  50 mg Oral BID Bala Randall MD   50 mg at 08/01/25 0850    DULoxetine (CYMBALTA) DR capsule 120 mg  120 mg Oral Daily Bala Randall MD   120 mg at 08/01/25 0845    famotidine (PEPCID) tablet 20 mg  20 mg Oral BID Bala Randall MD   20 mg at 08/01/25 0845    ferrous sulfate (FEROSUL) tablet 325 mg  325 mg Oral Daily with breakfast Bala Randall MD   325 mg at 08/01/25 0845    magnesium oxide (MAG-OX) half-tab 200 mg  200 mg Oral Daily Bala Randall MD   200 mg at 08/01/25 0845    mirabegron (MYRBETRIQ) 24 hr tablet 50 mg  50 mg Oral Daily Bala Randall MD   50 mg at 08/01/25 0850    OLANZapine (zyPREXA) tablet 15 mg  15 mg Oral At Bedtime Bala Randall MD   15 mg at 07/31/25 2124    oxyBUTYnin ER (DITROPAN XL) 24 hr tablet 30 mg  30 mg Oral Daily Bala Randall MD   30 mg at 08/01/25 0846    polyethylene glycol (MIRALAX) Packet 17 g  17 g Oral Daily Bala Randall MD        [Held by provider] potassium gluconate tablet 2.5 mEq  2.5 mEq Oral Daily Thai Thao MD        prazosin (MINIPRESS) capsule 3 mg  3 mg Oral At Bedtime Bala Randall MD   3 mg at 07/31/25 2123    rifampin (RIFADIN) capsule 300 mg  300 mg Oral BID Bala Randall MD   300 mg at 08/01/25 0845    senna-docusate (SENOKOT-S/PERICOLACE) 8.6-50 MG per tablet 1 tablet  1 tablet [facility]ID [facility]   1 tablet at 08/01/25 0845    Or    senna-docusate (SENOKOT-S/PERICOLACE) 8.6-50 MG  "per tablet 2 tablet  2 tablet Oral BID Bala Randall MD        sodium chloride (PF) 0.9% PF flush 10-40 mL  10-40 mL Intracatheter Q7 Days Marlo Adams, DO   30 mL at 07/30/25 0942    sodium chloride (PF) 0.9% PF flush 10-40 mL  10-40 mL Intracatheter Daily Marlo Adams, DO   20 mL at 08/01/25 0846     Data     Laboratory:  Recent Labs   Lab 08/01/25  0612 07/31/25  0623 07/30/25  0617 07/29/25  0644   WBC 7.3 8.9  --  8.0   HGB 7.3* 7.4* 7.1* 7.6*   HCT 22.9* 22.5*  --  22.6*   MCV 87 86 86 85    333  --  332     Recent Labs   Lab 08/01/25  0612 08/01/25  0408 08/01/25  0212 07/31/25  1706 07/31/25  0623 07/30/25  1250     --   --   --  141 140   POTASSIUM 3.5  --   --   --  3.5 3.6   CHLORIDE 104  --   --   --  102 102   CO2 28  --   --   --  28 28   ANIONGAP 11  --   --   --  11 10   GLC 88 96 106*   < > 84 87   BUN 11.5  --   --   --  10.3 10.4   CR 1.58*  --   --   --  1.57* 1.48*   GFRESTIMATED 55*  --   --   --  56* 60*   FILEMON 9.1  --   --   --  8.9 8.7*    < > = values in this interval not displayed.     No results for input(s): \"CULT\" in the last 168 hours.    Imaging:  No results found for this or any previous visit (from the past 24 hours).        Marlo Adams DO MPH  [facility]  [address] [place] [address]  08/01/2025   "

## 2025-08-01 NOTE — PLAN OF CARE
"VSS. PO dilaudid given for pain. A/OX4. LS-clear. Up w/ SBA-assist of 1 w/ cane. Continent of bowel and bladder. Voiding w/ use of urinal. Regular diet w/ good appetite. PICC dressing clean,dry,intact. RT hip dressing clean, dry, intact. Wound vac intact. Discharge TBD.    Goal Outcome Evaluation:      Plan of Care Reviewed With: patient    Overall Patient Progress: improvingOverall Patient Progress: improving    Outcome Evaluation: Pain control    Problem: Adult Inpatient Plan of Care  Goal: Plan of Care Review  Description: The Plan of Care Review/Shift note should be completed every shift.  The Outcome Evaluation is a brief statement about your assessment that the patient is improving, declining, or no change.  This information will be displayed automatically on your shift  note.  Outcome: Progressing  Flowsheets (Taken 8/1/2025 1151)  Outcome Evaluation: Pain control  Plan of Care Reviewed With: patient  Overall Patient Progress: improving  Goal: Patient-Specific Goal (Individualized)  Description: You can add care plan individualizations to a care plan. Examples of Individualization might be:  \"Parent requests to be called daily at 9am for status\", \"I have a hard time hearing out of my right ear\", or \"Do not touch me to wake me up as it startles  me\".  Outcome: Progressing  Goal: Absence of Hospital-Acquired Illness or Injury  Outcome: Progressing  Intervention: Identify and Manage Fall Risk  Recent Flowsheet Documentation  Taken 8/1/2025 1139 by Pura Darden, RN  Safety Promotion/Fall Prevention: activity supervised  Intervention: Prevent and Manage VTE (Venous Thromboembolism) Risk  Recent Flowsheet Documentation  Taken 8/1/2025 1139 by Pura Darden, RN  VTE Prevention/Management:   SCDs off (sequential compression devices)   patient refused intervention  Goal: Optimal Comfort and Wellbeing  Outcome: Progressing  Intervention: Monitor Pain and Promote Comfort  Recent Flowsheet Documentation  Taken " 8/1/2025 0843 by Pura Darden, RN  Pain Management Interventions:   medication (see MAR)   repositioned  Goal: Readiness for Transition of Care  Outcome: Progressing     Problem: Infection  Goal: Absence of Infection Signs and Symptoms  Outcome: Progressing  Intervention: Prevent or Manage Infection  Recent Flowsheet Documentation  Taken 8/1/2025 1139 by Pura Darden, RN  Isolation Precautions: contact precautions maintained     Problem: Pain Acute  Goal: Optimal Pain Control and Function  Outcome: Progressing  Intervention: Develop Pain Management Plan  Recent Flowsheet Documentation  Taken 8/1/2025 0843 by Pura Darden, RN  Pain Management Interventions:   medication (see MAR)   repositioned  Intervention: Prevent or Manage Pain  Recent Flowsheet Documentation  Taken 8/1/2025 1139 by Pura Darden, RN  Medication Review/Management: medications reviewed

## 2025-08-01 NOTE — PLAN OF CARE
"Pt AOx4. Used CPAP at night. VSS. PRN Dilaudid was administered for pain control. PICC is patent. R-hip dressing is clean, dry, and intact. Wound vac therapy is continued. Contact precautions are maintained. Plan for discharge to TCU when medically stable.    Goal Outcome Evaluation:      Plan of Care Reviewed With: patient    Overall Patient Progress: improvingOverall Patient Progress: improving    Outcome Evaluation: AOX2, VSS      Problem: Adult Inpatient Plan of Care  Goal: Plan of Care Review  Description: The Plan of Care Review/Shift note should be completed every shift.  The Outcome Evaluation is a brief statement about your assessment that the patient is improving, declining, or no change.  This information will be displayed automatically on your shift  note.  Outcome: Progressing  Flowsheets (Taken 8/1/2025 0304)  Outcome Evaluation: AOX2, VSS  Plan of Care Reviewed With: patient  Overall Patient Progress: improving  Goal: Patient-Specific Goal (Individualized)  Description: You can add care plan individualizations to a care plan. Examples of Individualization might be:  \"Parent requests to be called daily at 9am for status\", \"I have a hard time hearing out of my right ear\", or \"Do not touch me to wake me up as it startles  me\".  Outcome: Progressing  Goal: Absence of Hospital-Acquired Illness or Injury  Outcome: Progressing  Intervention: Identify and Manage Fall Risk  Recent Flowsheet Documentation  Taken 8/1/2025 0045 by Niurka Orta, RN  Safety Promotion/Fall Prevention:   activity supervised   assistive device/personal items within reach   clutter free environment maintained   nonskid shoes/slippers when out of bed   room door open   room near nurse's station   room organization consistent   safety round/check completed   supervised activity  Intervention: Prevent Skin Injury  Recent Flowsheet Documentation  Taken 8/1/2025 0045 by Niurka Orta, RN  Body Position: position changed " independently  Intervention: Prevent and Manage VTE (Venous Thromboembolism) Risk  Recent Flowsheet Documentation  Taken 8/1/2025 0045 by Niurka Orta RN  VTE Prevention/Management:   patient refused intervention   SCDs off (sequential compression devices)  Intervention: Prevent Infection  Recent Flowsheet Documentation  Taken 8/1/2025 0045 by Niurka Orta RN  Infection Prevention:   single patient room provided   rest/sleep promoted   personal protective equipment utilized   hand hygiene promoted   equipment surfaces disinfected   environmental surveillance performed   cohorting utilized  Goal: Optimal Comfort and Wellbeing  Outcome: Progressing  Goal: Readiness for Transition of Care  Outcome: Progressing     Problem: Infection  Goal: Absence of Infection Signs and Symptoms  Outcome: Progressing  Intervention: Prevent or Manage Infection  Recent Flowsheet Documentation  Taken 8/1/2025 0045 by Niurka Orta RN  Isolation Precautions: contact precautions maintained     Problem: Pain Acute  Goal: Optimal Pain Control and Function  Outcome: Progressing  Intervention: Prevent or Manage Pain  Recent Flowsheet Documentation  Taken 8/1/2025 0045 by Niurka Orta RN  Medication Review/Management: medications reviewed

## 2025-08-01 NOTE — PLAN OF CARE
"Shift Summary (2161 - 9653):    A/O x4, flat effect. VSS, afebrile. R hip pain managed w/ PRN dilaudid x2 and scheduled Tylenol. Denies dizziness, SOB, nausea. K/Mg protocols, AM rechecks. Wound vac intact, dressing re-enforced. PICC wnl, CHG completed. Assist of 1 gb and cane, uses urinal at bedside. Continues abx. BLE lymph wraps in place. Ortho and ID following. Contact precautions maintained. Awaiting TCU placement.     Goal Outcome Evaluation:      Plan of Care Reviewed With: patient    Overall Patient Progress: no change    Outcome Evaluation: PO dilaudid x2 for R hip pain. Continues abx. Awaiting TCU placement.      Problem: Adult Inpatient Plan of Care  Goal: Plan of Care Review  Description: The Plan of Care Review/Shift note should be completed every shift.  The Outcome Evaluation is a brief statement about your assessment that the patient is improving, declining, or no change.  This information will be displayed automatically on your shift  note.  Outcome: Progressing  Flowsheets (Taken 7/31/2025 6666)  Outcome Evaluation: PO dilaudid x2 for R hip pain. Continues abx. Awaiting TCU placement.  Plan of Care Reviewed With: patient  Overall Patient Progress: no change  Goal: Patient-Specific Goal (Individualized)  Description: You can add care plan individualizations to a care plan. Examples of Individualization might be:  \"Parent requests to be called daily at 9am for status\", \"I have a hard time hearing out of my right ear\", or \"Do not touch me to wake me up as it startles  me\".  Outcome: Progressing  Goal: Absence of Hospital-Acquired Illness or Injury  Outcome: Progressing  Intervention: Identify and Manage Fall Risk  Recent Flowsheet Documentation  Taken 7/31/2025 1614 by Nahomy Torrez, RN  Safety Promotion/Fall Prevention:   activity supervised   assistive device/personal items within reach   clutter free environment maintained   nonskid shoes/slippers when out of bed   room door open   room near nurse's " station   room organization consistent   safety round/check completed   supervised activity  Intervention: Prevent Skin Injury  Recent Flowsheet Documentation  Taken 7/31/2025 1614 by Nahomy Torrez RN  Body Position: position changed independently  Intervention: Prevent and Manage VTE (Venous Thromboembolism) Risk  Recent Flowsheet Documentation  Taken 7/31/2025 1614 by Nahomy Torrez RN  VTE Prevention/Management: patient refused intervention  Intervention: Prevent Infection  Recent Flowsheet Documentation  Taken 7/31/2025 1614 by Nahomy Torrez RN  Infection Prevention:   single patient room provided   rest/sleep promoted   personal protective equipment utilized   hand hygiene promoted   equipment surfaces disinfected   environmental surveillance performed   cohorting utilized  Goal: Optimal Comfort and Wellbeing  Outcome: Progressing  Intervention: Monitor Pain and Promote Comfort  Recent Flowsheet Documentation  Taken 7/31/2025 1614 by Nahomy Torrez RN  Pain Management Interventions: medication (see MAR)  Goal: Readiness for Transition of Care  Outcome: Progressing     Problem: Infection  Goal: Absence of Infection Signs and Symptoms  Outcome: Progressing  Intervention: Prevent or Manage Infection  Recent Flowsheet Documentation  Taken 7/31/2025 1614 by Nahomy Torrez RN  Isolation Precautions: contact precautions maintained     Problem: Pain Acute  Goal: Optimal Pain Control and Function  Outcome: Progressing  Intervention: Develop Pain Management Plan  Recent Flowsheet Documentation  Taken 7/31/2025 1614 by Nahomy Torrez RN  Pain Management Interventions: medication (see MAR)  Intervention: Prevent or Manage Pain  Recent Flowsheet Documentation  Taken 7/31/2025 1614 by Nahomy Torrez RN  Medication Review/Management: medications reviewed

## 2025-08-01 NOTE — PROGRESS NOTES
Orthopedic Surgery  8/1/2025  POD#: 3    S: Patient voices no complaints today. Resting comfortably in bed. Denies pain.    O: Blood pressure 111/51, pulse 80, temperature 98.1  F (36.7  C), resp. rate 16, height 1.829 m (6'), weight (!) 164.1 kg (361 lb 12.8 oz), SpO2 96%.  Lab Results   Component Value Date    HGB 7.3 08/01/2025    HGB 13.4 10/26/2020     Lab Results   Component Value Date    INR 1.20 07/21/2025    INR 0.92 01/09/2017        I/O last 3 completed shifts:  In: 1350 [P.O.:1340; I.V.:10]  Out: 1600 [Urine:1600]    Neurovascularly intact.  Calves are negative bilaterally, both soft and nontender.  The wound is C/D/I. Prevena intact with no drainage.  The wound looks good with minimal erythema of the surrounding skin.    A: Mr. Greenwood is doing well status post Procedure(s):  Irrigation and debridement, evacuation hematoma right hip.    P: Prevena dressing tubing removed from hospital VAC unit and attached to Prevena unit (pt need to bring home plastic Prevena bag upon discharge, with charging cable)  1. Mobilize and continue physical therapy. No hip precautions.  2. Anticoagulation - ASA BID  3. Septic R MARGO - s/p I&D with head and liner exchange, and repeat I&D with wound closure (7/29); Prevena dressing in place and receiving IV daptomycin for MARGO infection for total of 6 weeks.  4. Pain management - controlled  5. Anticipate discharge to TCU pending placement. It sounds like the daptomycin might be a barrier. OK to discharge from ortho perspective. Also discussed the possibility of returning home, now that there are no drainage issues, he has been mobile and can receive home infusion assistance.        Judy Barragan PA-C  [facility]  O: [phone]

## 2025-08-01 NOTE — PROGRESS NOTES
[facility]    Infectious Disease Progress Note    Date of Service (when I saw the patient): 08/01/2025     Assessment & Plan   Kimo Greenwood is a 44 year old male who was admitted on 7/19/2025.     Impression:  43 yo male with history of a right total hip arthroplasty in April 2025.    Approx 10 days ago he fell and began developing right hip pain and swelling.    He was seen in urgent care and ER visits on multiple occasions and eventually was placed on oral antibiotics.    With continued pain and swelling presented to the emergency room 2 days ago and was admitted for possibly infected right hip wound.    S/p I and D and combined, with revision of head and acetabular liner   OR culture with staph aureus MRSA, 1 culture with staph epi but clearly MRSA the pathogen here  MRSA PCR Positive      Recommendations   Continue daptomycin at purposely high dose of 1000 mg, by lean body weight this is about 8 to 10 mg/kg which is the desired dose here, still suspect creatinine will improve and make that the correct dose although of note creatinine continues to be over 1.5, daptomycin not an issue with renal insufficiency as a causative agent but the dose may need to be lowered if renal function worsens further  Continue rifampin which he is tolerating okay  Still risk for that this will fail but nothing to suggest that currently so plan on full course of IV treatment followed by extended oral  Doing okay, of some interest creatinine still elevated note baseline has been elevated as well,  Daptomycin might be an issue at any care center, however vancomycin not viable here with his renal dysfunction    Didier Andrade MD    Interval History   readmitted with drainage and possible infected seroma, discussed with Dr. Randall not obvious infection fully drained now creatinine stable seems to be doing relatively well      Physical Exam   Temp: 98.1  F (36.7  C) Temp src: Oral BP: 111/51 Pulse: 80   Resp:  16 SpO2: 96 % O2 Device: BiPAP/CPAP    Vitals:    07/30/25 0601 07/31/25 0500 08/01/25 0633   Weight: (!) 162.8 kg (358 lb 14.5 oz) (!) 163.1 kg (359 lb 9.1 oz) (!) 164.1 kg (361 lb 12.8 oz)     Vital Signs with Ranges  Temp:  [97.9  F (36.6  C)-98.1  F (36.7  C)] 98.1  F (36.7  C)  Pulse:  [80-88] 80  Resp:  [16-18] 16  BP: (111-140)/(51-67) 111/51  SpO2:  [94 %-98 %] 96 %      Medications   Current Facility-Administered Medications   Medication Dose Route Frequency Provider Last Rate Last Admin     Current Facility-Administered Medications   Medication Dose Route Frequency Provider Last Rate Last Admin    allopurinol (ZYLOPRIM) tablet 300 mg  300 mg Oral Daily Bala Randall MD   300 mg at 08/01/25 0845    aspirin (ASA) EC tablet 325 mg  325 mg Oral BID Bala Randall MD   325 mg at 08/01/25 0845    DAPTOmycin (CUBICIN) 1,000 mg in sodium chloride 0.9 % 100 mL intermittent infusion  6 mg/kg Intravenous Q24H Thai Thao MD   1,000 mg at 07/31/25 2133    doxycycline hyclate (VIBRAMYCIN) capsule 50 mg  50 mg Oral BID Bala Randall MD   50 mg at 08/01/25 0850    DULoxetine (CYMBALTA) DR capsule 120 mg  120 mg Oral Daily Bala Randall MD   120 mg at 08/01/25 0845    famotidine (PEPCID) tablet 20 mg  20 mg Oral BID Bala Randall MD   20 mg at 08/01/25 0845    ferrous sulfate (FEROSUL) tablet 325 mg  325 mg Oral Daily with breakfast Bala Randall MD   325 mg at 08/01/25 0845    magnesium oxide (MAG-OX) half-tab 200 mg  200 mg Oral Daily Bala Randall MD   200 mg at 08/01/25 0845    mirabegron (MYRBETRIQ) 24 hr tablet 50 mg  50 mg Oral Daily Bala Randall MD   50 mg at 08/01/25 0850    OLANZapine (zyPREXA) tablet 15 mg  15 mg Oral At Bedtime Bala Randall MD   15 mg at 07/31/25 2124    oxyBUTYnin ER (DITROPAN XL) 24 hr tablet 30 mg  30 mg Oral Daily Bala Randall MD   30 mg at 08/01/25 0846     "polyethylene glycol (MIRALAX) Packet 17 g  17 g Oral Daily Bala Randall MD        [Held by provider] potassium gluconate tablet 2.5 mEq  2.5 mEq Oral Daily Thai Thao MD        prazosin (MINIPRESS) capsule 3 mg  3 mg Oral At Bedtime Bala Randall MD   3 mg at 07/31/25 2123    rifampin (RIFADIN) capsule 300 mg  300 mg Oral BID Bala Randall MD   300 mg at 08/01/25 0845    senna-docusate (SENOKOT-S/PERICOLACE) 8.6-50 MG per tablet 1 tablet  1 tablet Oral BID Bala Randall MD   1 tablet at 08/01/25 0845    Or    senna-docusate (SENOKOT-S/PERICOLACE) 8.6-50 MG per tablet 2 tablet  2 tablet Oral BID Bala Randall MD        sodium chloride (PF) 0.9% PF flush 10-40 mL  10-40 mL Intracatheter Q7 Days Marlo Adams, DO   30 mL at 07/30/25 0942    sodium chloride (PF) 0.9% PF flush 10-40 mL  10-40 mL Intracatheter Daily Marlo Adams, DO   20 mL at 08/01/25 0846       Data   All microbiology laboratory data reviewed.  Recent Labs   Lab Test 08/01/25  0612 07/31/25  0623 07/30/25  0617 07/29/25  0644   WBC 7.3 8.9  --  8.0   HGB 7.3* 7.4* 7.1* 7.6*   HCT 22.9* 22.5*  --  22.6*   MCV 87 86 86 85    333  --  332     Recent Labs   Lab Test 08/01/25  0612 07/31/25  0623 07/30/25  1250   CR 1.58* 1.57* 1.48*     Recent Labs   Lab Test 07/19/25  1431   SED 76*     Recent Labs   Lab Test 06/24/19  0844 06/24/19  0843   CULT No anaerobes isolated  No anaerobes isolated  No growth  No growth No anaerobes isolated  No growth       All cultures:  No results for input(s): \"CULTURE\" in the last 168 hours.     Blood culture:  Results for orders placed or performed during the hospital encounter of 07/19/25   Blood Culture Peripheral blood (BC) Hand, Left    Collection Time: 07/19/25  4:15 PM    Specimen: Hand, Left; Peripheral blood (BC)   Result Value Ref Range    Culture No Growth    Blood Culture Peripheral blood (BC) Arm, Right    Collection Time: " 07/19/25  2:32 PM    Specimen: Arm, Right; Peripheral blood (BC)   Result Value Ref Range    Culture No Growth    Results for orders placed or performed during the hospital encounter of 05/23/25   Blood Culture Peripheral blood (BC) Arm, Right    Collection Time: 05/23/25  9:18 PM    Specimen: Arm, Right; Peripheral blood (BC)   Result Value Ref Range    Culture No Growth      *Note: Due to a large number of results and/or encounters for the requested time period, some results have not been displayed. A complete set of results can be found in Results Review.      Urine culture:  No results found. However, due to the size of the patient record, not all encounters were searched. Please check Results Review for a complete set of results.

## 2025-08-01 NOTE — PROVIDER NOTIFICATION
08/01/25 0249   Tech Time   $Tech Time (10 minute increments) 3   Mode: CPAP/ BiPAP/ AVAPS/ AVAPS AE   CPAP/BiPAP/ AVAPS/ AVAPS AE Mode CPAP   Equipment   Device Resmed   CPAP/BiPAP/Settings   $CPAP/BiPAP Subsequent completed   BIPAP/CPAP On Standby On   Oxygen (%) 21   CPAP/BiPAP Patient Parameters   CPAP (cm H2O)   (autoset 5-20)   RT Device Skin Assessment   Oxygen Delivery Device CPAP/BiPAP Mask   Interface Face Mask - Medium   Site Appearance neck circumference Clean and dry   Site Appearance bridge of nose Clean and dry   Site Appearance occiput Clean and dry   Strap Tightness Finger Allowance between head and device strap   Device Skin Interventions Taken No adjustments needed     Khalif Gilman RT, RT  8/1/2025 2:49 AM

## 2025-08-02 ENCOUNTER — APPOINTMENT (OUTPATIENT)
Dept: OCCUPATIONAL THERAPY | Facility: CLINIC | Age: 45
DRG: 857 | End: 2025-08-02
Payer: COMMERCIAL

## 2025-08-02 ENCOUNTER — MEDICAL CORRESPONDENCE (OUTPATIENT)
Dept: HEALTH INFORMATION MANAGEMENT | Facility: CLINIC | Age: 45
End: 2025-08-02

## 2025-08-02 ENCOUNTER — HOSPITAL ENCOUNTER (INPATIENT)
Facility: SKILLED NURSING FACILITY | Age: 45
DRG: 559 | End: 2025-08-02
Attending: INTERNAL MEDICINE | Admitting: INTERNAL MEDICINE
Payer: COMMERCIAL

## 2025-08-02 VITALS
DIASTOLIC BLOOD PRESSURE: 65 MMHG | SYSTOLIC BLOOD PRESSURE: 143 MMHG | TEMPERATURE: 98 F | HEIGHT: 72 IN | OXYGEN SATURATION: 97 % | WEIGHT: 315 LBS | HEART RATE: 82 BPM | BODY MASS INDEX: 42.66 KG/M2 | RESPIRATION RATE: 18 BRPM

## 2025-08-02 DIAGNOSIS — K21.9 LPRD (LARYNGOPHARYNGEAL REFLUX DISEASE): ICD-10-CM

## 2025-08-02 DIAGNOSIS — M25.572 PAIN IN JOINT INVOLVING ANKLE AND FOOT, LEFT: ICD-10-CM

## 2025-08-02 DIAGNOSIS — L02.415 ABSCESS OF RIGHT HIP: Primary | ICD-10-CM

## 2025-08-02 DIAGNOSIS — R53.81 PHYSICAL DECONDITIONING: ICD-10-CM

## 2025-08-02 LAB
ANION GAP SERPL CALCULATED.3IONS-SCNC: 11 MMOL/L (ref 7–15)
BUN SERPL-MCNC: 12 MG/DL (ref 6–20)
CALCIUM SERPL-MCNC: 9.1 MG/DL (ref 8.8–10.4)
CHLORIDE SERPL-SCNC: 105 MMOL/L (ref 98–107)
CREAT SERPL-MCNC: 1.56 MG/DL (ref 0.67–1.17)
EGFRCR SERPLBLD CKD-EPI 2021: 56 ML/MIN/1.73M2
ERYTHROCYTE [DISTWIDTH] IN BLOOD BY AUTOMATED COUNT: 14.8 % (ref 10–15)
GLUCOSE SERPL-MCNC: 97 MG/DL (ref 70–99)
HCO3 SERPL-SCNC: 26 MMOL/L (ref 22–29)
HCT VFR BLD AUTO: 24.5 % (ref 40–53)
HGB BLD-MCNC: 7.8 G/DL (ref 13.3–17.7)
MAGNESIUM SERPL-MCNC: 1.9 MG/DL (ref 1.7–2.3)
MCH RBC QN AUTO: 27.9 PG (ref 26.5–33)
MCHC RBC AUTO-ENTMCNC: 31.8 G/DL (ref 31.5–36.5)
MCV RBC AUTO: 88 FL (ref 78–100)
PLATELET # BLD AUTO: 359 10E3/UL (ref 150–450)
POTASSIUM SERPL-SCNC: 3.6 MMOL/L (ref 3.4–5.3)
RBC # BLD AUTO: 2.8 10E6/UL (ref 4.4–5.9)
SODIUM SERPL-SCNC: 142 MMOL/L (ref 135–145)
WBC # BLD AUTO: 9.8 10E3/UL (ref 4–11)

## 2025-08-02 PROCEDURE — 85018 HEMOGLOBIN: CPT | Performed by: HOSPITALIST

## 2025-08-02 PROCEDURE — 83735 ASSAY OF MAGNESIUM: CPT | Performed by: HOSPITALIST

## 2025-08-02 PROCEDURE — 999N000157 HC STATISTIC RCP TIME EA 10 MIN

## 2025-08-02 PROCEDURE — 250N000013 HC RX MED GY IP 250 OP 250 PS 637: Performed by: ORTHOPAEDIC SURGERY

## 2025-08-02 PROCEDURE — 99232 SBSQ HOSP IP/OBS MODERATE 35: CPT | Performed by: INTERNAL MEDICINE

## 2025-08-02 PROCEDURE — 80048 BASIC METABOLIC PNL TOTAL CA: CPT | Performed by: HOSPITALIST

## 2025-08-02 PROCEDURE — 99239 HOSP IP/OBS DSCHRG MGMT >30: CPT | Performed by: HOSPITALIST

## 2025-08-02 PROCEDURE — 97530 THERAPEUTIC ACTIVITIES: CPT | Mod: GO

## 2025-08-02 PROCEDURE — 94660 CPAP INITIATION&MGMT: CPT

## 2025-08-02 PROCEDURE — 250N000011 HC RX IP 250 OP 636: Performed by: INTERNAL MEDICINE

## 2025-08-02 PROCEDURE — 250N000013 HC RX MED GY IP 250 OP 250 PS 637: Performed by: INTERNAL MEDICINE

## 2025-08-02 PROCEDURE — 120N000009 HC R&B SNF

## 2025-08-02 PROCEDURE — 258N000003 HC RX IP 258 OP 636: Performed by: INTERNAL MEDICINE

## 2025-08-02 RX ORDER — BISACODYL 10 MG
10 SUPPOSITORY, RECTAL RECTAL DAILY PRN
Status: ON HOLD | DISCHARGE
Start: 2025-08-02

## 2025-08-02 RX ORDER — MULTIVITAMIN WITH IRON
250 TABLET ORAL DAILY
Status: DISCONTINUED | OUTPATIENT
Start: 2025-08-02 | End: 2025-08-02

## 2025-08-02 RX ORDER — NALOXONE HYDROCHLORIDE 0.4 MG/ML
0.2 INJECTION, SOLUTION INTRAMUSCULAR; INTRAVENOUS; SUBCUTANEOUS
Status: DISCONTINUED | OUTPATIENT
Start: 2025-08-02 | End: 2025-08-10 | Stop reason: HOSPADM

## 2025-08-02 RX ORDER — RIFAMPIN 300 MG/1
300 CAPSULE ORAL EVERY 12 HOURS
Status: DISCONTINUED | OUTPATIENT
Start: 2025-08-02 | End: 2025-08-10 | Stop reason: HOSPADM

## 2025-08-02 RX ORDER — AMOXICILLIN 250 MG
2 CAPSULE ORAL 2 TIMES DAILY
Status: DISCONTINUED | OUTPATIENT
Start: 2025-08-02 | End: 2025-08-02

## 2025-08-02 RX ORDER — POLYETHYLENE GLYCOL 3350 17 G/17G
17 POWDER, FOR SOLUTION ORAL DAILY
Status: ON HOLD | DISCHARGE
Start: 2025-08-02

## 2025-08-02 RX ORDER — OXYBUTYNIN CHLORIDE 10 MG/1
30 TABLET, EXTENDED RELEASE ORAL DAILY
Status: DISCONTINUED | OUTPATIENT
Start: 2025-08-03 | End: 2025-08-10 | Stop reason: HOSPADM

## 2025-08-02 RX ORDER — ACETAMINOPHEN 325 MG/1
650 TABLET ORAL EVERY 4 HOURS PRN
Status: DISCONTINUED | OUTPATIENT
Start: 2025-08-02 | End: 2025-08-10 | Stop reason: HOSPADM

## 2025-08-02 RX ORDER — LOPERAMIDE HYDROCHLORIDE 2 MG/1
2 CAPSULE ORAL 4 TIMES DAILY PRN
Status: DISCONTINUED | OUTPATIENT
Start: 2025-08-02 | End: 2025-08-10 | Stop reason: HOSPADM

## 2025-08-02 RX ORDER — AMOXICILLIN 250 MG
1 CAPSULE ORAL 2 TIMES DAILY
Status: DISCONTINUED | OUTPATIENT
Start: 2025-08-02 | End: 2025-08-10 | Stop reason: HOSPADM

## 2025-08-02 RX ORDER — AMOXICILLIN 250 MG
1 CAPSULE ORAL 2 TIMES DAILY
Status: DISCONTINUED | OUTPATIENT
Start: 2025-08-02 | End: 2025-08-02

## 2025-08-02 RX ORDER — BISACODYL 10 MG
10 SUPPOSITORY, RECTAL RECTAL DAILY PRN
Status: DISCONTINUED | OUTPATIENT
Start: 2025-08-02 | End: 2025-08-10 | Stop reason: HOSPADM

## 2025-08-02 RX ORDER — AMOXICILLIN 250 MG
2 CAPSULE ORAL 2 TIMES DAILY
Status: DISCONTINUED | OUTPATIENT
Start: 2025-08-02 | End: 2025-08-10 | Stop reason: HOSPADM

## 2025-08-02 RX ORDER — DOXYCYCLINE HYCLATE 50 MG/1
50 CAPSULE ORAL 2 TIMES DAILY
Status: DISCONTINUED | OUTPATIENT
Start: 2025-08-02 | End: 2025-08-03

## 2025-08-02 RX ORDER — DAPTOMYCIN 50 MG/ML
6 INJECTION, POWDER, LYOPHILIZED, FOR SOLUTION INTRAVENOUS EVERY 24 HOURS
Status: DISCONTINUED | OUTPATIENT
Start: 2025-08-02 | End: 2025-08-02

## 2025-08-02 RX ORDER — ACETAMINOPHEN 325 MG/1
650 TABLET ORAL EVERY 6 HOURS PRN
Status: DISCONTINUED | OUTPATIENT
Start: 2025-08-02 | End: 2025-08-02

## 2025-08-02 RX ORDER — FERROUS SULFATE 325(65) MG
325 TABLET ORAL
Status: DISCONTINUED | OUTPATIENT
Start: 2025-08-03 | End: 2025-08-10 | Stop reason: HOSPADM

## 2025-08-02 RX ORDER — OXYCODONE HYDROCHLORIDE 5 MG/1
5-10 TABLET ORAL EVERY 6 HOURS PRN
Status: DISCONTINUED | OUTPATIENT
Start: 2025-08-02 | End: 2025-08-02

## 2025-08-02 RX ORDER — OXYCODONE HYDROCHLORIDE 5 MG/1
5 TABLET ORAL EVERY 6 HOURS PRN
Refills: 0 | Status: DISCONTINUED | OUTPATIENT
Start: 2025-08-02 | End: 2025-08-05

## 2025-08-02 RX ORDER — MIRABEGRON 25 MG/1
50 TABLET, FILM COATED, EXTENDED RELEASE ORAL DAILY
Status: DISCONTINUED | OUTPATIENT
Start: 2025-08-03 | End: 2025-08-10 | Stop reason: HOSPADM

## 2025-08-02 RX ORDER — PANTOPRAZOLE SODIUM 40 MG/1
40 TABLET, DELAYED RELEASE ORAL
Status: DISCONTINUED | OUTPATIENT
Start: 2025-08-03 | End: 2025-08-05

## 2025-08-02 RX ORDER — AMOXICILLIN 250 MG
2 CAPSULE ORAL 2 TIMES DAILY
Status: ON HOLD | DISCHARGE
Start: 2025-08-02

## 2025-08-02 RX ORDER — ACETAMINOPHEN 650 MG/1
650 SUPPOSITORY RECTAL EVERY 4 HOURS PRN
Status: DISCONTINUED | OUTPATIENT
Start: 2025-08-02 | End: 2025-08-10 | Stop reason: HOSPADM

## 2025-08-02 RX ORDER — ALLOPURINOL 300 MG/1
300 TABLET ORAL DAILY
Status: DISCONTINUED | OUTPATIENT
Start: 2025-08-03 | End: 2025-08-10 | Stop reason: HOSPADM

## 2025-08-02 RX ORDER — HYDROMORPHONE HYDROCHLORIDE 2 MG/1
2 TABLET ORAL EVERY 6 HOURS PRN
Refills: 0 | Status: DISCONTINUED | OUTPATIENT
Start: 2025-08-02 | End: 2025-08-02 | Stop reason: HOSPADM

## 2025-08-02 RX ORDER — OLANZAPINE 15 MG/1
15 TABLET, FILM COATED ORAL AT BEDTIME
Status: DISCONTINUED | OUTPATIENT
Start: 2025-08-02 | End: 2025-08-10 | Stop reason: HOSPADM

## 2025-08-02 RX ORDER — NALOXONE HYDROCHLORIDE 0.4 MG/ML
0.4 INJECTION, SOLUTION INTRAMUSCULAR; INTRAVENOUS; SUBCUTANEOUS
Status: DISCONTINUED | OUTPATIENT
Start: 2025-08-02 | End: 2025-08-10 | Stop reason: HOSPADM

## 2025-08-02 RX ORDER — SENNOSIDES 8.6 MG
325 CAPSULE ORAL 2 TIMES DAILY
Status: DISCONTINUED | OUTPATIENT
Start: 2025-08-02 | End: 2025-08-04

## 2025-08-02 RX ORDER — DOXYCYCLINE HYCLATE 50 MG/1
50 CAPSULE ORAL 2 TIMES DAILY
Status: CANCELLED | OUTPATIENT
Start: 2025-08-02

## 2025-08-02 RX ORDER — POLYETHYLENE GLYCOL 3350 17 G/17G
17 POWDER, FOR SOLUTION ORAL DAILY PRN
Status: DISCONTINUED | OUTPATIENT
Start: 2025-08-02 | End: 2025-08-10 | Stop reason: HOSPADM

## 2025-08-02 RX ORDER — OXYCODONE HYDROCHLORIDE 5 MG/1
10 TABLET ORAL EVERY 6 HOURS PRN
Refills: 0 | Status: DISCONTINUED | OUTPATIENT
Start: 2025-08-02 | End: 2025-08-05

## 2025-08-02 RX ORDER — DULOXETIN HYDROCHLORIDE 60 MG/1
120 CAPSULE, DELAYED RELEASE ORAL DAILY
Status: DISCONTINUED | OUTPATIENT
Start: 2025-08-03 | End: 2025-08-10 | Stop reason: HOSPADM

## 2025-08-02 RX ADMIN — PRAZOSIN HYDROCHLORIDE 3 MG: 2 CAPSULE ORAL at 21:38

## 2025-08-02 RX ADMIN — OLANZAPINE 15 MG: 15 TABLET, FILM COATED ORAL at 21:37

## 2025-08-02 RX ADMIN — DOXYCYCLINE HYCLATE 50 MG: 50 CAPSULE ORAL at 09:00

## 2025-08-02 RX ADMIN — RIFAMPIN 300 MG: 300 CAPSULE ORAL at 08:58

## 2025-08-02 RX ADMIN — Medication 200 MG: at 08:58

## 2025-08-02 RX ADMIN — DOXYCYCLINE HYCLATE 50 MG: 50 CAPSULE ORAL at 21:38

## 2025-08-02 RX ADMIN — DULOXETINE 120 MG: 60 CAPSULE, DELAYED RELEASE ORAL at 08:57

## 2025-08-02 RX ADMIN — POLYETHYLENE GLYCOL 3350 17 G: 17 POWDER, FOR SOLUTION ORAL at 08:57

## 2025-08-02 RX ADMIN — ASPIRIN 325 MG: 325 TABLET, COATED ORAL at 08:58

## 2025-08-02 RX ADMIN — ALLOPURINOL 300 MG: 300 TABLET ORAL at 08:58

## 2025-08-02 RX ADMIN — OXYBUTYNIN CHLORIDE 30 MG: 5 TABLET, EXTENDED RELEASE ORAL at 08:58

## 2025-08-02 RX ADMIN — MIRABEGRON 50 MG: 50 TABLET, EXTENDED RELEASE ORAL at 08:59

## 2025-08-02 RX ADMIN — DAPTOMYCIN 1000 MG: 500 INJECTION, POWDER, LYOPHILIZED, FOR SOLUTION INTRAVENOUS at 21:37

## 2025-08-02 RX ADMIN — RIFAMPIN 300 MG: 300 CAPSULE ORAL at 20:18

## 2025-08-02 RX ADMIN — ASPIRIN 325 MG: 325 TABLET, COATED ORAL at 20:18

## 2025-08-02 RX ADMIN — SENNOSIDES AND DOCUSATE SODIUM 2 TABLET: 50; 8.6 TABLET ORAL at 20:17

## 2025-08-02 RX ADMIN — FAMOTIDINE 20 MG: 20 TABLET, FILM COATED ORAL at 08:58

## 2025-08-02 RX ADMIN — FERROUS SULFATE TAB 325 MG (65 MG ELEMENTAL FE) 325 MG: 325 (65 FE) TAB at 08:58

## 2025-08-02 ASSESSMENT — ACTIVITIES OF DAILY LIVING (ADL)
ADLS_ACUITY_SCORE: 42
ADLS_ACUITY_SCORE: 35
ADLS_ACUITY_SCORE: 42
ADLS_ACUITY_SCORE: 35
ADLS_ACUITY_SCORE: 42
ADLS_ACUITY_SCORE: 58
ADLS_ACUITY_SCORE: 42
ADLS_ACUITY_SCORE: 42
ADLS_ACUITY_SCORE: 36
ADLS_ACUITY_SCORE: 35
ADLS_ACUITY_SCORE: 42
ADLS_ACUITY_SCORE: 35
ADLS_ACUITY_SCORE: 39

## 2025-08-02 NOTE — DISCHARGE SUMMARY
Pt discharged via stretcher per transport. Reports given. PICC line intact for abx infusion at new facility. All belongings returned to pt. Unused medications discarded per hospital's policy. Pt verbalized understanding and had no other questions.

## 2025-08-02 NOTE — PLAN OF CARE
"Physical Therapy Discharge Summary    Reason for therapy discharge:    Discharged to transitional care facility.    Progress towards therapy goal(s). See goals on Care Plan in [facility] electronic health record for goal details.  Goals partially met.  Barriers to achieving goals:   discharge from facility.    Therapy recommendation(s):    Continued therapy is recommended.  Rationale/Recommendations:  per most recent PT note: \"Pt presenting below baseline, requiring SBA with 2WW for ambulation. Lives alone in apartment but has good social support near by. Presenting with decreased activity tolerance. Recommend TCU to address mobility deficits, increase strength and ROM, and increase ambulation.; Was requiring further assist for medical management\" .      "

## 2025-08-02 NOTE — PLAN OF CARE
"Goal Outcome Evaluation:       Pt A&O, denied pain, for mobility use one assist gait belt, cane, voided adequately, ate 100% of his meal, has edema +3 in BLE, plan to go to TCU today with PICC Line.    BP (!) 143/65 (BP Location: Left arm)   Pulse 82   Temp 98  F (36.7  C)   Resp 18   Ht 1.829 m (6')   Wt (!) 165.2 kg (364 lb 4.8 oz)   SpO2 97%   BMI 49.41 kg/m     Problem: Adult Inpatient Plan of Care  Goal: Plan of Care Review  Description: The Plan of Care Review/Shift note should be completed every shift.  The Outcome Evaluation is a brief statement about your assessment that the patient is improving, declining, or no change.  This information will be displayed automatically on your shift  note.  8/2/2025 1009 by Patricia Ramirez RN  Outcome: Met  8/2/2025 1009 by Patricia Ramirez RN  Outcome: Progressing  Goal: Patient-Specific Goal (Individualized)  Description: You can add care plan individualizations to a care plan. Examples of Individualization might be:  \"Parent requests to be called daily at 9am for status\", \"I have a hard time hearing out of my right ear\", or \"Do not touch me to wake me up as it startles  me\".  8/2/2025 1009 by Patricia Ramirez RN  Outcome: Met  8/2/2025 1009 by Patricia Ramirez RN  Outcome: Progressing  Goal: Absence of Hospital-Acquired Illness or Injury  8/2/2025 1009 by Patricia Ramirez RN  Outcome: Met  8/2/2025 1009 by Patricia Ramirez RN  Outcome: Progressing  Intervention: Identify and Manage Fall Risk  Recent Flowsheet Documentation  Taken 8/2/2025 1000 by Patricia Ramirez RN  Safety Promotion/Fall Prevention:   activity supervised   assistive device/personal items within reach   clutter free environment maintained   increased rounding and observation   nonskid shoes/slippers when out of bed   patient and family education   room door open   room organization consistent   room near nurse's station   safety round/check completed   supervised " activity  Intervention: Prevent Skin Injury  Recent Flowsheet Documentation  Taken 8/2/2025 1000 by Patricia Ramirez RN  Body Position: position changed independently  Skin Protection: adhesive use limited  Intervention: Prevent and Manage VTE (Venous Thromboembolism) Risk  Recent Flowsheet Documentation  Taken 8/2/2025 1000 by Patricia Ramirez RN  VTE Prevention/Management: patient refused intervention  Intervention: Prevent Infection  Recent Flowsheet Documentation  Taken 8/2/2025 1000 by Patricia Ramirez RN  Infection Prevention:   single patient room provided   rest/sleep promoted   personal protective equipment utilized   hand hygiene promoted   equipment surfaces disinfected   environmental surveillance performed   cohorting utilized  Goal: Optimal Comfort and Wellbeing  8/2/2025 1009 by Patricia Ramirez RN  Outcome: Met  8/2/2025 1009 by Patricia Ramirez RN  Outcome: Progressing  Goal: Readiness for Transition of Care  8/2/2025 1009 by Patricia Ramirez RN  Outcome: Met  8/2/2025 1009 by Patricia Ramirez RN  Outcome: Progressing     Problem: Infection  Goal: Absence of Infection Signs and Symptoms  8/2/2025 1009 by Patricia Ramirez RN  Outcome: Met  8/2/2025 1009 by Patricia Ramirez RN  Outcome: Progressing  Intervention: Prevent or Manage Infection  Recent Flowsheet Documentation  Taken 8/2/2025 1000 by Patricia Ramirez RN  Isolation Precautions: contact precautions maintained     Problem: Pain Acute  Goal: Optimal Pain Control and Function  8/2/2025 1009 by Patricia Ramirez RN  Outcome: Met  8/2/2025 1009 by Patricia Ramirez RN  Outcome: Progressing  Intervention: Prevent or Manage Pain  Recent Flowsheet Documentation  Taken 8/2/2025 1000 by Patricia Ramirez RN  Medication Review/Management: medications reviewed

## 2025-08-02 NOTE — PROGRESS NOTES
[facility]    Infectious Disease Progress Note    Date of Service (when I saw the patient): 08/02/2025     Assessment & Plan   Kimo Greenwood is a 44 year old male who was admitted on 7/19/2025.     Impression:  43 yo male with history of a right total hip arthroplasty in April 2025.    Approx 10 days ago he fell and began developing right hip pain and swelling.    He was seen in urgent care and ER visits on multiple occasions and eventually was placed on oral antibiotics.    With continued pain and swelling presented to the emergency room 2 days ago and was admitted for possibly infected right hip wound.    S/p I and D and combined, with revision of head and acetabular liner   OR culture with staph aureus MRSA, 1 culture with staph epi but clearly MRSA the pathogen here  MRSA PCR Positive      Recommendations   Continue daptomycin at purposely high dose of 1000 mg, by lean body weight this is about 8 to 10 mg/kg which is the desired dose here, still suspect creatinine will improve and make that the correct dose although of note creatinine continues to be over 1.5, daptomycin not an issue with renal insufficiency as a causative agent but the dose may need to be lowered if renal function worsens further  Continue rifampin which he is tolerating okay  Still risk for that this will fail but nothing to suggest that currently so plan on full course of IV treatment followed by extended oral  Doing okay, of some interest creatinine still elevated note baseline has been elevated as well,  Orders in for discharge, daptomycin dose desired here is 1000 mg daily, continue to follow labs, continue rifampin 300 twice daily for at least the 6 weeks, should see us in follow-up in clinic in about 4 to 5 weeks  Not sure where doxy order came from, not needed  Didier Andrade MD    Interval History   readmitted with drainage and possible infected seroma, discussed with Dr. Randall not obvious infection fully  drained now creatinine stable seems to be doing relatively well      Physical Exam   Temp: 98  F (36.7  C) Temp src: Axillary BP: (!) 143/65 Pulse: 82   Resp: 18 SpO2: 97 % O2 Device: None (Room air)    Vitals:    07/31/25 0500 08/01/25 0633 08/02/25 0547   Weight: (!) 163.1 kg (359 lb 9.1 oz) (!) 164.1 kg (361 lb 12.8 oz) (!) 165.2 kg (364 lb 4.8 oz)     Vital Signs with Ranges  Temp:  [97.7  F (36.5  C)-98  F (36.7  C)] 98  F (36.7  C)  Pulse:  [74-89] 82  Resp:  [16-18] 18  BP: (138-143)/(62-69) 143/65  FiO2 (%):  [21 %] 21 %  SpO2:  [92 %-97 %] 97 %      Medications   Current Facility-Administered Medications   Medication Dose Route Frequency Provider Last Rate Last Admin     Current Facility-Administered Medications   Medication Dose Route Frequency Provider Last Rate Last Admin    allopurinol (ZYLOPRIM) tablet 300 mg  300 mg Oral Daily Bala Randall MD   300 mg at 08/02/25 0858    aspirin (ASA) EC tablet 325 mg  325 mg Oral BID Bala Randall MD   325 mg at 08/02/25 0858    DAPTOmycin (CUBICIN) 1,000 mg in sodium chloride 0.9 % 100 mL intermittent infusion  6 mg/kg Intravenous Q24H Thai Thao MD   1,000 mg at 08/01/25 2213    doxycycline hyclate (VIBRAMYCIN) capsule 50 mg  50 mg Oral BID Bala Randall MD   50 mg at 08/02/25 0900    DULoxetine (CYMBALTA) DR capsule 120 mg  120 mg Oral Daily Bala Randall MD   120 mg at 08/02/25 0857    famotidine (PEPCID) tablet 20 mg  20 mg Oral BID Bala Randall MD   20 mg at 08/02/25 0858    ferrous sulfate (FEROSUL) tablet 325 mg  325 mg Oral Daily with breakfast Bala Randall MD   325 mg at 08/02/25 0858    magnesium oxide (MAG-OX) half-tab 200 mg  200 mg Oral Daily Bala Randall MD   200 mg at 08/02/25 0858    mirabegron (MYRBETRIQ) 24 hr tablet 50 mg  50 mg Oral Daily Bala Randall MD   50 mg at 08/02/25 0859    OLANZapine (zyPREXA) tablet 15 mg  15 mg Oral At Bedtime  "Bala Randall MD   15 mg at 08/01/25 2212    oxyBUTYnin ER (DITROPAN XL) 24 hr tablet 30 mg  30 mg Oral Daily Bala Randall MD   30 mg at 08/02/25 0858    polyethylene glycol (MIRALAX) Packet 17 g  17 g Oral Daily Bala Randall MD   17 g at 08/02/25 0857    [Held by provider] potassium gluconate tablet 2.5 mEq  2.5 mEq Oral Daily Thai Thao MD        prazosin (MINIPRESS) capsule 3 mg  3 mg Oral At Bedtime Bala Randall MD   3 mg at 08/01/25 2213    rifampin (RIFADIN) capsule 300 mg  300 mg Oral BID Bala Randall MD   300 mg at 08/02/25 0858    senna-docusate (SENOKOT-S/PERICOLACE) 8.6-50 MG per tablet 1 tablet  1 tablet Oral BID Bala Randall MD   1 tablet at 08/01/25 0845    Or    senna-docusate (SENOKOT-S/PERICOLACE) 8.6-50 MG per tablet 2 tablet  2 tablet Oral BID Bala Randall MD        sodium chloride (PF) 0.9% PF flush 10-40 mL  10-40 mL Intracatheter Q7 Days Marlo Adams DO   30 mL at 07/30/25 0942    sodium chloride (PF) 0.9% PF flush 10-40 mL  10-40 mL Intracatheter Daily Marlo Adams DO   10 mL at 08/02/25 0859       Data   All microbiology laboratory data reviewed.  Recent Labs   Lab Test 08/02/25  0552 08/01/25  0612 07/31/25  0623   WBC 9.8 7.3 8.9   HGB 7.8* 7.3* 7.4*   HCT 24.5* 22.9* 22.5*   MCV 88 87 86    331 333     Recent Labs   Lab Test 08/02/25  0552 08/01/25  0612 07/31/25  0623   CR 1.56* 1.58* 1.57*     Recent Labs   Lab Test 07/19/25  1431   SED 76*     Recent Labs   Lab Test 06/24/19  0844 06/24/19  0843   CULT No anaerobes isolated  No anaerobes isolated  No growth  No growth No anaerobes isolated  No growth       All cultures:  No results for input(s): \"CULTURE\" in the last 168 hours.     Blood culture:  Results for orders placed or performed during the hospital encounter of 07/19/25   Blood Culture Peripheral blood (BC) Hand, Left    Collection Time: 07/19/25  4:15 PM    " Specimen: Hand, Left; Peripheral blood (BC)   Result Value Ref Range    Culture No Growth    Blood Culture Peripheral blood (BC) Arm, Right    Collection Time: 07/19/25  2:32 PM    Specimen: Arm, Right; Peripheral blood (BC)   Result Value Ref Range    Culture No Growth    Results for orders placed or performed during the hospital encounter of 05/23/25   Blood Culture Peripheral blood (BC) Arm, Right    Collection Time: 05/23/25  9:18 PM    Specimen: Arm, Right; Peripheral blood (BC)   Result Value Ref Range    Culture No Growth      *Note: Due to a large number of results and/or encounters for the requested time period, some results have not been displayed. A complete set of results can be found in Results Review.      Urine culture:  No results found. However, due to the size of the patient record, not all encounters were searched. Please check Results Review for a complete set of results.

## 2025-08-02 NOTE — PROGRESS NOTES
Care Management Discharge Note    Discharge Date: 08/02/2025       Discharge Disposition: Transitional Care    Discharge Services:  rehab    Discharge DME:  none    Discharge Transportation: family or friend will provide    Private pay costs discussed: transportation costs    Does the patient's insurance plan have a 3 day qualifying hospital stay waiver?  Yes     Which insurance plan 3 day waiver is available? Alternative insurance waiver    Will the waiver be used for post-acute placement? Yes    PAS Confirmation Code: [id number]  Patient/family educated on Medicare website which has current facility and service quality ratings: yes    Education Provided on the Discharge Plan:  yes  Persons Notified of Discharge Plans: patient, guardian  Patient/Family in Agreement with the Plan: yes    Handoff Referral Completed: Yes, [facility] PCP: Internal handoff referral completed    Additional Information:  Patient discharging to [facility]. Stretcher transport scheduled for [id number]. PCS form completed.     Discharge orders complete and updated facility. They are able to pull orders from Epic.     Carol Bradford RN  Care Coordinator  [facility]

## 2025-08-02 NOTE — DISCHARGE SUMMARY
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  Increased serosanguineous drainage from the wound VAC  Right lower extremity hematoma  Acute kidney injury  Cognitive impairment  Hypertension  Progressive anemia             Discharge Disposition:     Discharged to rehabilitation facility           Allergies:     Allergies   Allergen Reactions    Other [No Clinical Screening - See Comments] Other (See Comments)     Awoke from anesthesia with anger and ripping off dressing, after interstim placement, outside hospital 2013              Discharge Medications:     Current Discharge Medication List        START taking these medications    Details   bisacodyl (DULCOLAX) 10 MG suppository Place 1 suppository (10 mg) rectally daily as needed for constipation (Use if Magnesium hydroxide (MILK of MAGNESIA) not effective after 24 hours. May discontinue if patient having bowel movement.).    Associated Diagnoses: Postoperative hemorrhage of musculoskeletal structure following musculoskeletal procedure      polyethylene glycol (MIRALAX) 17 GM/Dose powder Take 17 g by mouth daily.    Comments: Hold for loose stools.  Associated Diagnoses: Postoperative hemorrhage of musculoskeletal structure following musculoskeletal procedure      !! senna-docusate (SENOKOT-S/PERICOLACE) 8.6-50 MG tablet Take 2 tablets by mouth 2 times daily.    Associated Diagnoses: Postoperative hemorrhage of musculoskeletal structure following musculoskeletal procedure       !! - Potential duplicate medications found. Please  "discuss with provider.        CONTINUE these medications which have NOT CHANGED    Details   acetaminophen (TYLENOL) 325 MG tablet Take 2 tablets (650 mg) by mouth every 6 hours.  Qty: 100 tablet, Refills: 0    Associated Diagnoses: S/P total right hip arthroplasty      allopurinol (ZYLOPRIM) 300 MG tablet Take 1 tablet (300 mg) by mouth daily.  Qty: 90 tablet, Refills: 1    Associated Diagnoses: Acute gouty arthritis      aspirin (ASA) 325 MG EC tablet Take one Aspirin tab twice daily for 5 weeks.  Qty: 70 tablet, Refills: 0    Associated Diagnoses: S/P total right hip arthroplasty      calcium polycarbophil (FIBER-LAX) 625 MG tablet Take 1 tablet (625 mg) by mouth daily.  Qty: 90 tablet, Refills: 3    Associated Diagnoses: Slow transit constipation      cholecalciferol (VITAMIN D3) 10 mcg (400 units) TABS tablet Take 10 mcg by mouth daily.      DAPTOmycin (CUBICIN) in NS syringe Inject 13 mLs (650 mg) over 5-10 minutes into the vein via push every 24 hours.  Qty: 870901 mL, Refills: 0    Associated Diagnoses: Infection of prosthetic joint, initial encounter      doxycycline hyclate (VIBRAMYCIN) 50 MG capsule Take 50 mg by mouth 2 times daily.      DULoxetine (CYMBALTA) 60 MG capsule Take 120 mg by mouth daily       Emergency Supply Kit, Central, Patient use for emergency only. Contents: 3 sodium chloride 0.9% flushes, 1 dressing kit, 1 microclave ext set 14\", 4 nitrile gloves (med), 6 alcohol prep pads, 1 bacitracin, 1 syringe (10 cc 20 G 1\"). Call [phone] to reorder.  Qty: [id number] kit, Refills: 0    Associated Diagnoses: Infection of prosthetic joint, initial encounter      ferrous sulfate (FEROSUL) 325 (65 Fe) MG tablet TAKE 1 TABLET BY MOUTH DAILY WITH BREAKFAST  Qty: 28 tablet, Refills: 11    Comments: Maximum Refills Reached  Associated Diagnoses: Anemia, unspecified type      hydrocortisone 2.5 % cream Apply topically 2 times daily. Apply a thin layer to face once daily for up to 2 weeks, decrease use " as rash improves, repeat as needed for flares      hydrOXYzine HCl (ATARAX) 25 MG tablet Take 1-2 tablets (25-50 mg) by mouth every 6 hours as needed for other (muscle spasms).  Qty: 60 tablet, Refills: 0    Associated Diagnoses: S/P total right hip arthroplasty      ketoconazole (NIZORAL) 2 % external cream Apply topically 2 times daily. Apply to rash on face twice daily until improved for seborrheic dermatitis      ketoconazole (NIZORAL) 2 % external shampoo Apply topically daily as needed for itching or irritation.      loperamide (IMODIUM) 2 MG capsule Take 2 mg by mouth 4 times daily as needed for diarrhea.      magnesium 250 MG tablet Take 1 tablet by mouth daily.      mirabegron (MYRBETRIQ) 50 MG 24 hr tablet Take 1 tablet by mouth daily      OLANZapine (ZYPREXA) 15 MG tablet Take 15 mg by mouth at bedtime.      oxyBUTYnin ER (DITROPAN XL) 15 MG 24 hr tablet Take 2 tablets (30 mg) by mouth daily  Qty: 60 tablet, Refills: 1    Associated Diagnoses: Overactive bladder      oxyCODONE (ROXICODONE) 5 MG tablet Take 5-10 mg by mouth every 6 hours as needed for severe pain.      potassium gluconate 2.5 MEQ tablet Take 2.5 mEq by mouth daily.      prazosin (MINIPRESS) 1 MG capsule Take 3 mg by mouth At Bedtime      rifampin (RIFADIN) 300 MG capsule Take 1 capsule (300 mg) by mouth every 12 hours.  Qty: 84 capsule, Refills: 0    Associated Diagnoses: S/P total right hip arthroplasty; Infection of prosthetic joint, initial encounter      !! senna-docusate (SENOKOT-S/PERICOLACE) 8.6-50 MG tablet Take 1 tablet by mouth 2 times daily.  Qty: 30 tablet, Refills: 0    Associated Diagnoses: S/P total right hip arthroplasty      sodium chloride, PF, 0.9% PF flush Inject 10 mLs into the vein as needed for line flush. Flush IV before and after medication administration as directed and/or at least every 24 hours.  Qty: 430355 mL, Refills: 0    Associated Diagnoses: Infection of prosthetic joint, initial encounter       tirzepatide-Weight Management (ZEPBOUND) 12.5 MG/0.5ML prefilled pen Inject 0.5 mLs (12.5 mg) subcutaneously every 7 days.  Qty: 6 mL, Refills: 1    Associated Diagnoses: Class 3 severe obesity due to excess calories with serious comorbidity and body mass index (BMI) of 50.0 to 59.9 in adult (H)       !! - Potential duplicate medications found. Please discuss with provider.                 Condition on Discharge:     Discharge condition: Stable   Discharge vitals: Blood pressure (!) 143/65, pulse 82, temperature 98  F (36.7  C), resp. rate 18, height 1.829 m (6'), weight (!) 165.2 kg (364 lb 4.8 oz), SpO2 97%.   Code status on discharge: Full Code      BASIC PHYSICAL EXAMINATION:  GENERAL: No apparent distress.  CARDIOVASCULAR: Regular rate and rhythm without murmurs.  PULMONARY: Clear to auscultation bilaterally.   GASTROINTESTINAL: Abdomen soft, non-tender.  EXTREMITIES: No edema, pulses intact.  NEUROLOGIC: No focal deficits.            History of Illness:   See detailed admission note for full details.               Procedures excluding imaging which is summarized below:     Please see details in the electronic medical record.           Consultations:     INFECTIOUS DISEASES IP CONSULT  ORTHOPEDIC SURGERY IP CONSULT  CARE MANAGEMENT / SOCIAL WORK IP CONSULT  WOUND OSTOMY CONTINENCE NURSE  IP CONSULT  HOSPITALIST IP CONSULT  PHYSICAL THERAPY ADULT IP CONSULT  OCCUPATIONAL THERAPY ADULT IP CONSULT  CARE MANAGEMENT / SOCIAL WORK IP CONSULT  VASCULAR ACCESS ADULT IP CONSULT  LYMPHEDEMA THERAPY IP CONSULT  PHYSICAL THERAPY ADULT IP CONSULT  OCCUPATIONAL THERAPY ADULT IP CONSULT          Significant Results:     Results for orders placed or performed during the hospital encounter of 07/28/25   CT Hip Right w Contrast    Narrative    EXAM: CT HIP RIGHT W CONTRAST  LOCATION: [facility]  DATE: 7/28/2025    INDICATION: Right hip surgery; pain and persistent bleeding.  COMPARISON:  07/19/2025  TECHNIQUE: Without and with IV contrast. Axial, sagittal and coronal thin-section reconstruction. Dose reduction techniques were used.   CONTRAST: 100 mL Isovue 370    FINDINGS:     BONES:  Status post right total hip arthroplasty. Components are in normal alignment. Metallic components create streak/beam-hardening artifact limiting evaluation of adjacent structures. No CT evidence of periprosthetic fracture. Unchanged cystic-appearing   lucent lesion within the right acetabulum which was also seen in 2023. Partial ankylosis of the right sacroiliac joint. Degenerative changes of the lower lumbar spine and pubic symphysis. Osseous demineralization.    SOFT TISSUES:  Since 07/19/2025, marked interval decrease in size of the fluid and gas collection along the right hip surgical tract which is likely secondary to interval intervention. Residual area of gas, fluid, and stranding/scarring measures approximately 3.8 x   10.0 x 16.5 cm (AP x ML x CC). Portions of the fluid contacts the right hip arthroplasty and right greater trochanter.    Multiple enlarged right inguinal and right pelvic lymph nodes which are likely reactive.    Right sacral neurostimulator. Focal fat within the right iliopsoas muscle consistent with sequela of prior injury/strain.      Impression    IMPRESSION:  1.  Marked interval decrease in size of the fluid and gas collection along the right hip surgical tract which is likely secondary to interval intervention.  2.  Multiple enlarged right inguinal and right pelvic lymph nodes which are likely reactive.       US Lower Extremity Venous Duplex Bilateral    Narrative    EXAM: US LOWER EXTREMITY VENOUS DUPLEX BILATERAL  LOCATION: [facility]  DATE: 7/28/2025    INDICATION: Bilateral lower extremity pain, swelling and edema.  COMPARISON: None.  TECHNIQUE: Venous Duplex ultrasound of bilateral lower extremities with and without compression, augmentation and duplex. Color  flow and spectral Doppler with waveform analysis performed.    FINDINGS: Exam includes the common femoral, femoral, popliteal veins as well as segmentally visualized deep calf veins and greater saphenous vein.     RIGHT: No deep vein thrombosis. No superficial thrombophlebitis. No popliteal cyst.    LEFT: No deep vein thrombosis. No superficial thrombophlebitis. No popliteal cyst.      Impression    IMPRESSION:  1.  No deep venous thrombosis in the bilateral lower extremities.     *Note: Due to a large number of results and/or encounters for the requested time period, some results have not been displayed. A complete set of results can be found in Results Review.       Transthoracic Echocardiogram Results:  No results found for this or any previous visit (from the past 4320 hours).             Pending Results:     Unresulted Labs Ordered in the Past 30 Days of this Admission       Date and Time Order Name Status Description    7/21/2025  8:55 AM Anaerobic Bacterial Culture Routine Preliminary                         Discharge Instructions and Follow-Up:     Discharge instructions and follow-up:   Discharge Procedure Orders   Med Therapy Management Referral   Referral Priority: Priority: 1-2 Weeks Referral Type: Med Therapy Management   Requested Specialty: Pharmacist   Number of Visits Requested: 1     Primary Care - Care Coordination Referral   Standing Status: Future   Referral Priority: Routine: Next available opening Referral Type: Care Coordination   Number of Visits Requested: 1     General info for SNF   Order Comments: Length of Stay Estimate: Short Term Care: Estimated # of Days <30  Condition at Discharge: Improving  Level of care:skilled   Rehabilitation Potential: Excellent  Admission H&P remains valid and up-to-date: Yes  Recent Chemotherapy: N/A  Use Nursing Home Standing Orders: Yes     Mantoux instructions   Order Comments: Give two-step Mantoux (PPD) Per Facility Policy Yes     Reason for your  hospital stay   Order Comments: Foillowing repeat I&D of right hip, evacuation of hematoma, and placement of Prevena incisional wound vac     Wound care   Order Comments: Site:   right hip  Instructions:  Leave Prevena dressing intact and on negative pressure.  Call our office if battery pack begins [phone].     Activity - Up with assistive device     Order Specific Question Answer Comments   Is discharge order? Yes      Follow Up and recommended labs and tests   Order Comments: Follow up with Judy Barragan PA-C Aug 6th or .  If you do not already have a follow up appointment scheduled, please call our [place] office: [phone].  No follow up labs or test are needed.  Follow-up with Dr Andrade in 4-5 weeks.  Check BMP and CBC in 5 days.     Physical Therapy Adult Consult   Order Comments: Evaluate and treat as clinically indicated.    Reason:  s/p R MARGO I&D with head and liner exchange, placement of Prevena incision wound vac.  No hip precautions.     Occupational Therapy Adult Consult   Order Comments: Evaluate and treat as clinically indicated.    Reason:  s/p R MARGO I&D with head and liner exchange, placement of Prevena incision wound vac.  No hip precautions.     Crutches DME   Order Comments: DME Documentation: Describe the reason for need to support medical necessity: Impaired gait status post hip surgery. I, the undersigned, certify that the above prescribed supplies are medically necessary for this patient and is both reasonable and necessary in reference to accepted standards of medical practice in the treatment of this patient's condition and is not prescribed as a convenience.     Order Specific Question Answer Comments   Medical Equipment (DME) Supplier: [employer]    PATIENT INSTRUCTIONS: If you did not receive this ordered item today, please contact [employer] for availability ([place] Locations: [phone], [place]: [phone]).     Crutch Type: Standard    Crutches Add On: NA    Length of Need: Lifetime      Cane DME   Order Comments: DME Documentation: Describe the reason for need to support medical necessity: Impaired gait status post hip surgery. I, the undersigned, certify that the above prescribed supplies are medically necessary for this patient and is both reasonable and necessary in reference to accepted standards of medical practice in the treatment of this patient's condition and is not prescribed as a convenience.     Order Specific Question Answer Comments   Medical Equipment (DME) Supplier: [employer]    PATIENT INSTRUCTIONS: If you did not receive this ordered item today, please contact [employer] for availability ([facility] Locations: [phone], [place]: [phone]).    Cane Type: Single Tip    Reminder: Patient can typically get 1 every 5 years      Walker DME   Order Comments: DME Documentation: Describe the reason for need to support medical necessity: Impaired gait status post hip surgery. I, the undersigned, certify that the above prescribed supplies are medically necessary for this patient and is both reasonable and necessary in reference to accepted standards of medical practice in the treatment of this patient's condition and is not prescribed as a convenience.     Order Specific Question Answer Comments   Medical Equipment (DME) Supplier: [employer]    PATIENT INSTRUCTIONS: If you did not receive this ordered item today, please contact [employer] for availability ([facility] Locations: [phone], [place]: [phone]).    Walker Type: Standard (2 Wheel)    Accessories: N/A              Hospital Course:     Kimo Greenwood is a 44 year old male admitted on 2025.  He is a morbidly obese male with a mild cognitive impairment who had right hip arthroplasty in 2025 which was complicated by surgical site infection.  He had I&D  and revision of had an acetabular liner and OR cultures grew MRSA.  He was discharged 7/28 with a right hip wound VAC and IV daptomycin at home.     When he reached home, his care manager noticed that the wound VAC was full of serosanguineous discharge.  The wound VAC was changed and he fell up 3 containers in quick succession and was therefore brought back to ER.  His care manager also had some concerns about his ability to take care of the wound VAC by himself.     He was otherwise afebrile and hemodynamically stable, rather high systolic blood pressure in 180s.  He was lying comfortably on the bed and eating a sandwich.  Had hemoglobin of 7.7 which is actually better than 7.65 days ago. BUN/creatinine of 11.3/1.4.  CT of the right hip showed marked interval decrease in the fluid and gas collection along the right hip surgical track.     He was seen by orthopedics and underwent evacuation of hematoma, I&D, and wound closure in the OR.  ID has also been reconsulted.  The dose of his daptomycin was increased according to his ideal body weight.  He remains with some mild renal insufficiency and anemia but these appear relatively stable and we are anticipating gradual improvement.  He has been working with PT and will discharge to TCU this afternoon.  Signout was given to the oncoming physician at the rehab unit.     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  Hemoglobin is stable.  Vnous ultrasound bilateral lower extremities negative.  -Was on several days of IV Lasix but creatinine remains elevated so we have now backed off, no plans for diuretics upon discharge.  -Elevate bilateral lower extremities and wraps.    -Continue daptomycin and rifampin.     -Consulted ID and orthopedics.  -Follow-up with orthopedics and infectious disease as above.     Acute kidney injury  -Appears to plateau around 1.4-1.6 and overall stable.  -Hold diuretics and monitor closely.  -Check BMP in 5 days.     Mild cognitive impairment  -Lives independently at his own apartment with close follow up visits from  who is concerned about his ability to manage wound VAC himself.  -Social work consult and likely needs TCU placement per his mother.     Essential hypertension  - Continue prazosin.     Progressive anemia  -Progressive worsening of hemoglobin, likely multifactorial including surgical blood loss, phlebotomy, hemodilution, malnutrition and chronic disease.  No evidence of Fe deficiency.  -Hemoglobin stable 7-8 and anticipating gradual improvement.  -Recheck CBC in 5 days.     Chronic medical conditions  - Recent hip surgery.  On aspirin 325 twice daily for DVT prophylaxis.  - Morbid obesity.  On Ozempic at home, can be resumed on discharge.  - History of depression.  On Cymbalta and duloxetine.  - Bronchial asthma.  Not in exacerbation.  - History of Patel's esophagus and GERD.  Continue home regimen of PPI.  - Overactive bladder.  On oxybutynin.  - History of gout.  Was recently treated with colchicine and steroid burst.  Resumed allopurinol.  - Sleep apnea.  Continue CPAP.    The patient was seen, examined, and counseled on this day. The hospitalization and plan of care was reviewed with the patient extensively. All questions were addressed and the patient agreed to follow-up as noted above.      Total time spent in face to face contact with the patient and coordinating discharge was:  35 Minutes    Marlo Adams DO, MPH  [facility] Hospitalist  [address] [place] [address]  08/02/2025

## 2025-08-02 NOTE — PLAN OF CARE
"Shift Summary (1500 - 8420):    A/O x4. VSS, afebrile, 97% on RA. PO dilaudid x1 for R hip pain. Denies dizziness, SOB, nausea. K/Mg protocols, AM rechecks. New Prevena wound vac to right hip intact, dressing CDI. Hgb 7.3, no s/s of active bleeding observed. Continues abx. PICC single lumen WNL. Prosper lymph wraps in place. Assist of 1 gb cane, uses urinal at bedside. Good oral intake. Contact precaution maintained. Plan to discharge to TCU tomorrow, stretcher transportation.     Goal Outcome Evaluation:      Plan of Care Reviewed With: patient    Overall Patient Progress: improving    Outcome Evaluation: Pain improved, PO dilaudid x1 this shift. Plan to discharge to TCU tomorrow.        Problem: Adult Inpatient Plan of Care  Goal: Plan of Care Review  Description: The Plan of Care Review/Shift note should be completed every shift.  The Outcome Evaluation is a brief statement about your assessment that the patient is improving, declining, or no change.  This information will be displayed automatically on your shift  note.  Outcome: Progressing  Flowsheets (Taken 8/1/2025 5737)  Outcome Evaluation: Pain improved, PO dilaudid x1 this shift. Plan to discharge to TCU tomorrow.  Plan of Care Reviewed With: patient  Overall Patient Progress: improving  Goal: Patient-Specific Goal (Individualized)  Description: You can add care plan individualizations to a care plan. Examples of Individualization might be:  \"Parent requests to be called daily at 9am for status\", \"I have a hard time hearing out of my right ear\", or \"Do not touch me to wake me up as it startles  me\".  Outcome: Progressing  Goal: Absence of Hospital-Acquired Illness or Injury  Outcome: Progressing  Intervention: Identify and Manage Fall Risk  Recent Flowsheet Documentation  Taken 8/1/2025 1609 by Nahomy Torrez, RN  Safety Promotion/Fall Prevention:   activity supervised   assistive device/personal items within reach   clutter free environment maintained   " increased rounding and observation   nonskid shoes/slippers when out of bed   patient and family education   room door open   room organization consistent   room near nurse's station   safety round/check completed   supervised activity  Intervention: Prevent Skin Injury  Recent Flowsheet Documentation  Taken 8/1/2025 1609 by Nahomy Torrez RN  Body Position: position changed independently  Intervention: Prevent and Manage VTE (Venous Thromboembolism) Risk  Recent Flowsheet Documentation  Taken 8/1/2025 1609 by Nahomy Torrez RN  VTE Prevention/Management: patient refused intervention  Intervention: Prevent Infection  Recent Flowsheet Documentation  Taken 8/1/2025 1609 by Nahomy Torrez RN  Infection Prevention:   single patient room provided   rest/sleep promoted   personal protective equipment utilized   hand hygiene promoted   equipment surfaces disinfected   environmental surveillance performed   cohorting utilized  Goal: Optimal Comfort and Wellbeing  Outcome: Progressing  Intervention: Monitor Pain and Promote Comfort  Recent Flowsheet Documentation  Taken 8/1/2025 1609 by Nahomy Torrez RN  Pain Management Interventions: medication (see MAR)  Goal: Readiness for Transition of Care  Outcome: Progressing     Problem: Infection  Goal: Absence of Infection Signs and Symptoms  Outcome: Progressing  Intervention: Prevent or Manage Infection  Recent Flowsheet Documentation  Taken 8/1/2025 1609 by Nahomy Torrez RN  Isolation Precautions: contact precautions maintained     Problem: Pain Acute  Goal: Optimal Pain Control and Function  Outcome: Progressing  Intervention: Develop Pain Management Plan  Recent Flowsheet Documentation  Taken 8/1/2025 1609 by Nahomy Torrez RN  Pain Management Interventions: medication (see MAR)  Intervention: Prevent or Manage Pain  Recent Flowsheet Documentation  Taken 8/1/2025 1609 by Nahomy Torrez RN  Medication Review/Management: medications reviewed

## 2025-08-02 NOTE — PROGRESS NOTES
Orthopedic Surgery  8/2/2025  POD#: 4    S: Patient voices no complaints today. Resting comfortably in bed. Denies pain.    O: Blood pressure (!) 143/65, pulse 82, temperature 98  F (36.7  C), resp. rate 18, height 1.829 m (6'), weight (!) 165.2 kg (364 lb 4.8 oz), SpO2 97%.  Lab Results   Component Value Date    HGB 7.3 08/01/2025    HGB 13.4 10/26/2020     Lab Results   Component Value Date    INR 1.20 07/21/2025    INR 0.92 01/09/2017        I/O last 3 completed shifts:  In: 1960 [P.O.:1960]  Out: 1775 [Urine:1775]    Neurovascularly intact.  Calves are negative bilaterally, both soft and nontender.  The wound is C/D/I. Prevena intact with no drainage.  The wound looks good with minimal erythema of the surrounding skin.    A: Mr. Greenwood is doing well status post Procedure(s):  Irrigation and debridement, evacuation hematoma right hip.    P: Prevena dressing tubing removed from hospital VAC unit and attached to Prevena unit (pt need to bring home plastic Prevena bag upon discharge, with charging cable)  1. Mobilize and continue physical therapy. No hip precautions.  2. Anticoagulation - ASA BID  3. Septic R MARGO - s/p I&D with head and liner exchange, and repeat I&D with wound closure (7/29); Prevena dressing in place and receiving IV daptomycin for MARGO infection for total of 6 weeks.  4. Pain management - controlled  5. Anticipate discharge to TCU today. It sounds like the daptomycin might be a barrier. OK to discharge from ortho perspective. Also discussed the possibility of returning home, now that there are no drainage issues, he has been mobile and can receive home infusion assistance.        David Ribeiro MD  [facility]

## 2025-08-02 NOTE — PLAN OF CARE
"  A&O4 denied sob and chest pain.labs drawn.on RA IND following.lymphedema following.legs wrapped.lung sounds dinimshed       Goal Outcome Evaluation:      Plan of Care Reviewed With: patient    Overall Patient Progress: no changeOverall Patient Progress: no change       Problem: Adult Inpatient Plan of Care  Goal: Plan of Care Review  Description: The Plan of Care Review/Shift note should be completed every shift.  The Outcome Evaluation is a brief statement about your assessment that the patient is improving, declining, or no change.  This information will be displayed automatically on your shift  note.  Outcome: Progressing  Flowsheets (Taken 8/2/2025 0516)  Plan of Care Reviewed With: patient  Overall Patient Progress: no change  Goal: Patient-Specific Goal (Individualized)  Description: You can add care plan individualizations to a care plan. Examples of Individualization might be:  \"Parent requests to be called daily at 9am for status\", \"I have a hard time hearing out of my right ear\", or \"Do not touch me to wake me up as it startles  me\".  Outcome: Progressing  Goal: Absence of Hospital-Acquired Illness or Injury  Outcome: Progressing  Intervention: Identify and Manage Fall Risk  Recent Flowsheet Documentation  Taken 8/2/2025 0107 by Viviana Haley, RN  Safety Promotion/Fall Prevention: clutter free environment maintained  Intervention: Prevent and Manage VTE (Venous Thromboembolism) Risk  Recent Flowsheet Documentation  Taken 8/2/2025 0107 by Viviana Haley, RN  VTE Prevention/Management: patient refused intervention  Goal: Optimal Comfort and Wellbeing  Outcome: Progressing  Goal: Readiness for Transition of Care  Outcome: Progressing     Problem: Infection  Goal: Absence of Infection Signs and Symptoms  Outcome: Progressing     Problem: Pain Acute  Goal: Optimal Pain Control and Function  Outcome: Progressing             "

## 2025-08-03 ENCOUNTER — APPOINTMENT (OUTPATIENT)
Dept: ULTRASOUND IMAGING | Facility: CLINIC | Age: 45
End: 2025-08-03
Attending: INTERNAL MEDICINE
Payer: COMMERCIAL

## 2025-08-03 ENCOUNTER — APPOINTMENT (OUTPATIENT)
Dept: OCCUPATIONAL THERAPY | Facility: SKILLED NURSING FACILITY | Age: 45
DRG: 559 | End: 2025-08-03
Attending: INTERNAL MEDICINE
Payer: COMMERCIAL

## 2025-08-03 LAB
ANION GAP SERPL CALCULATED.3IONS-SCNC: 11 MMOL/L (ref 7–15)
BUN SERPL-MCNC: 14.6 MG/DL (ref 6–20)
CALCIUM SERPL-MCNC: 8.9 MG/DL (ref 8.8–10.4)
CHLORIDE SERPL-SCNC: 109 MMOL/L (ref 98–107)
CREAT SERPL-MCNC: 1.42 MG/DL (ref 0.67–1.17)
EGFRCR SERPLBLD CKD-EPI 2021: 63 ML/MIN/1.73M2
ERYTHROCYTE [DISTWIDTH] IN BLOOD BY AUTOMATED COUNT: 15.1 % (ref 10–15)
GLUCOSE SERPL-MCNC: 87 MG/DL (ref 70–99)
HCO3 SERPL-SCNC: 24 MMOL/L (ref 22–29)
HCT VFR BLD AUTO: 23 % (ref 40–53)
HGB BLD-MCNC: 7.4 G/DL (ref 13.3–17.7)
MCH RBC QN AUTO: 28.1 PG (ref 26.5–33)
MCHC RBC AUTO-ENTMCNC: 32.2 G/DL (ref 31.5–36.5)
MCV RBC AUTO: 88 FL (ref 78–100)
PLATELET # BLD AUTO: 301 10E3/UL (ref 150–450)
POTASSIUM SERPL-SCNC: 3.6 MMOL/L (ref 3.4–5.3)
RBC # BLD AUTO: 2.63 10E6/UL (ref 4.4–5.9)
SODIUM SERPL-SCNC: 144 MMOL/L (ref 135–145)
WBC # BLD AUTO: 8.8 10E3/UL (ref 4–11)

## 2025-08-03 PROCEDURE — 97165 OT EVAL LOW COMPLEX 30 MIN: CPT | Mod: GO

## 2025-08-03 PROCEDURE — 250N000013 HC RX MED GY IP 250 OP 250 PS 637: Performed by: INTERNAL MEDICINE

## 2025-08-03 PROCEDURE — 36415 COLL VENOUS BLD VENIPUNCTURE: CPT | Performed by: INTERNAL MEDICINE

## 2025-08-03 PROCEDURE — 80048 BASIC METABOLIC PNL TOTAL CA: CPT | Performed by: INTERNAL MEDICINE

## 2025-08-03 PROCEDURE — 250N000009 HC RX 250: Performed by: INTERNAL MEDICINE

## 2025-08-03 PROCEDURE — 99306 1ST NF CARE HIGH MDM 50: CPT | Performed by: INTERNAL MEDICINE

## 2025-08-03 PROCEDURE — 97535 SELF CARE MNGMENT TRAINING: CPT | Mod: GO

## 2025-08-03 PROCEDURE — 258N000003 HC RX IP 258 OP 636: Performed by: INTERNAL MEDICINE

## 2025-08-03 PROCEDURE — 250N000011 HC RX IP 250 OP 636: Performed by: INTERNAL MEDICINE

## 2025-08-03 PROCEDURE — 93971 EXTREMITY STUDY: CPT | Mod: 26 | Performed by: RADIOLOGY

## 2025-08-03 PROCEDURE — 120N000009 HC R&B SNF

## 2025-08-03 PROCEDURE — 85018 HEMOGLOBIN: CPT | Performed by: INTERNAL MEDICINE

## 2025-08-03 PROCEDURE — 93971 EXTREMITY STUDY: CPT | Mod: RT

## 2025-08-03 RX ORDER — ENOXAPARIN SODIUM 150 MG/ML
1 INJECTION SUBCUTANEOUS EVERY 12 HOURS
Status: DISCONTINUED | OUTPATIENT
Start: 2025-08-03 | End: 2025-08-04

## 2025-08-03 RX ADMIN — Medication 200 MG: at 08:27

## 2025-08-03 RX ADMIN — DOXYCYCLINE HYCLATE 50 MG: 50 CAPSULE ORAL at 11:04

## 2025-08-03 RX ADMIN — MIRABEGRON 50 MG: 25 TABLET, FILM COATED, EXTENDED RELEASE ORAL at 08:27

## 2025-08-03 RX ADMIN — PRAZOSIN HYDROCHLORIDE 3 MG: 2 CAPSULE ORAL at 21:55

## 2025-08-03 RX ADMIN — ACETAMINOPHEN 650 MG: 325 TABLET ORAL at 15:53

## 2025-08-03 RX ADMIN — OXYBUTYNIN CHLORIDE 30 MG: 10 TABLET, EXTENDED RELEASE ORAL at 08:28

## 2025-08-03 RX ADMIN — SENNOSIDES AND DOCUSATE SODIUM 2 TABLET: 50; 8.6 TABLET ORAL at 21:56

## 2025-08-03 RX ADMIN — PANTOPRAZOLE SODIUM 40 MG: 40 TABLET, DELAYED RELEASE ORAL at 08:28

## 2025-08-03 RX ADMIN — TUBERCULIN PURIFIED PROTEIN DERIVATIVE 5 UNITS: 5 INJECTION, SOLUTION INTRADERMAL at 08:29

## 2025-08-03 RX ADMIN — DULOXETINE HYDROCHLORIDE 120 MG: 60 CAPSULE, DELAYED RELEASE PELLETS ORAL at 08:27

## 2025-08-03 RX ADMIN — ASPIRIN 325 MG: 325 TABLET, COATED ORAL at 08:28

## 2025-08-03 RX ADMIN — ALLOPURINOL 300 MG: 300 TABLET ORAL at 08:28

## 2025-08-03 RX ADMIN — DAPTOMYCIN 1000 MG: 500 INJECTION, POWDER, LYOPHILIZED, FOR SOLUTION INTRAVENOUS at 22:10

## 2025-08-03 RX ADMIN — RIFAMPIN 300 MG: 300 CAPSULE ORAL at 21:57

## 2025-08-03 RX ADMIN — RIFAMPIN 300 MG: 300 CAPSULE ORAL at 08:28

## 2025-08-03 RX ADMIN — ASPIRIN 325 MG: 325 TABLET, COATED ORAL at 21:57

## 2025-08-03 RX ADMIN — ENOXAPARIN SODIUM 150 MG: 150 INJECTION SUBCUTANEOUS at 22:54

## 2025-08-03 RX ADMIN — OLANZAPINE 15 MG: 15 TABLET, FILM COATED ORAL at 21:56

## 2025-08-03 RX ADMIN — FERROUS SULFATE TAB 325 MG (65 MG ELEMENTAL FE) 325 MG: 325 (65 FE) TAB at 08:28

## 2025-08-03 RX ADMIN — OXYCODONE HYDROCHLORIDE 10 MG: 5 TABLET ORAL at 15:53

## 2025-08-03 ASSESSMENT — ACTIVITIES OF DAILY LIVING (ADL)
ADLS_ACUITY_SCORE: 38
ADLS_ACUITY_SCORE: 36
ADLS_ACUITY_SCORE: 38
ADLS_ACUITY_SCORE: 36
ADLS_ACUITY_SCORE: 38
ADLS_ACUITY_SCORE: 38
ADLS_ACUITY_SCORE: 36
ADLS_ACUITY_SCORE: 38
ADLS_ACUITY_SCORE: 36
ADLS_ACUITY_SCORE: 38
ADLS_ACUITY_SCORE: 38
ADLS_ACUITY_SCORE: 36
ADLS_ACUITY_SCORE: 38
ADLS_ACUITY_SCORE: 36
ADLS_ACUITY_SCORE: 38
ADLS_ACUITY_SCORE: 36

## 2025-08-03 NOTE — PLAN OF CARE
Occupational Therapy Discharge Summary    Reason for therapy discharge:    Discharged to transitional care facility.    Progress towards therapy goal(s). See goals on Care Plan in [facility] electronic health record for goal details.  Goals partially met.  Barriers to achieving goals:   discharge from facility.    Therapy recommendation(s):      Continued therapy is recommended.  Rationale/Recommendations:   .OT Rationale for DC Rec: Pt below baseline in ADLs and functional mobility, currently CGA and using FWW-at baseline pt independent with msot A/IADLs and use of cane. Does have PCA services to assist. Current concern is pt properly caring for wound vac at home. Anticipate pt will continue to progress to not requiring TCU and returning home. If returns home may benefit from  OT for home safety / RN for wound care and vac. Will follow.

## 2025-08-04 ENCOUNTER — PATIENT OUTREACH (OUTPATIENT)
Dept: CARE COORDINATION | Facility: CLINIC | Age: 45
End: 2025-08-04
Payer: COMMERCIAL

## 2025-08-04 ENCOUNTER — APPOINTMENT (OUTPATIENT)
Dept: OCCUPATIONAL THERAPY | Facility: SKILLED NURSING FACILITY | Age: 45
DRG: 559 | End: 2025-08-04
Attending: INTERNAL MEDICINE
Payer: COMMERCIAL

## 2025-08-04 ENCOUNTER — APPOINTMENT (OUTPATIENT)
Dept: PHYSICAL THERAPY | Facility: SKILLED NURSING FACILITY | Age: 45
DRG: 559 | End: 2025-08-04
Attending: INTERNAL MEDICINE
Payer: COMMERCIAL

## 2025-08-04 LAB
BACTERIA ASPIRATE CULT: NORMAL
CK SERPL-CCNC: 12 U/L (ref 39–308)
CREAT SERPL-MCNC: 1.36 MG/DL (ref 0.67–1.17)
CYSTATIN C (ROCHE): 1.9 MG/L (ref 0.6–1)
EGFRCR SERPLBLD CKD-EPI 2021: 66 ML/MIN/1.73M2
GFR/BSA.PRED SERPLBLD CYS-BASED-ARV: 35 ML/MIN/1.73M2
HOLD SPECIMEN: NORMAL
INR PPP: 1.03 (ref 0.85–1.15)
PROTHROMBIN TIME: 13.9 SECONDS (ref 11.8–14.8)
RADIOLOGIST FLAGS: ABNORMAL

## 2025-08-04 PROCEDURE — 250N000011 HC RX IP 250 OP 636: Performed by: INTERNAL MEDICINE

## 2025-08-04 PROCEDURE — 82565 ASSAY OF CREATININE: CPT | Performed by: INTERNAL MEDICINE

## 2025-08-04 PROCEDURE — 82550 ASSAY OF CK (CPK): CPT | Performed by: INTERNAL MEDICINE

## 2025-08-04 PROCEDURE — 258N000003 HC RX IP 258 OP 636: Performed by: INTERNAL MEDICINE

## 2025-08-04 PROCEDURE — 250N000013 HC RX MED GY IP 250 OP 250 PS 637: Performed by: INTERNAL MEDICINE

## 2025-08-04 PROCEDURE — 999N000157 HC STATISTIC RCP TIME EA 10 MIN

## 2025-08-04 PROCEDURE — 97535 SELF CARE MNGMENT TRAINING: CPT | Mod: GO | Performed by: OCCUPATIONAL THERAPIST

## 2025-08-04 PROCEDURE — 97530 THERAPEUTIC ACTIVITIES: CPT | Mod: GP

## 2025-08-04 PROCEDURE — 97161 PT EVAL LOW COMPLEX 20 MIN: CPT | Mod: GP

## 2025-08-04 PROCEDURE — 82610 CYSTATIN C: CPT | Performed by: INTERNAL MEDICINE

## 2025-08-04 PROCEDURE — 36415 COLL VENOUS BLD VENIPUNCTURE: CPT | Performed by: INTERNAL MEDICINE

## 2025-08-04 PROCEDURE — 120N000009 HC R&B SNF

## 2025-08-04 PROCEDURE — 85610 PROTHROMBIN TIME: CPT | Performed by: INTERNAL MEDICINE

## 2025-08-04 PROCEDURE — 94660 CPAP INITIATION&MGMT: CPT

## 2025-08-04 RX ORDER — WARFARIN SODIUM 5 MG/1
10 TABLET ORAL
Status: COMPLETED | OUTPATIENT
Start: 2025-08-04 | End: 2025-08-04

## 2025-08-04 RX ORDER — ENOXAPARIN SODIUM 150 MG/ML
1 INJECTION SUBCUTANEOUS EVERY 12 HOURS
Status: DISCONTINUED | OUTPATIENT
Start: 2025-08-04 | End: 2025-08-05

## 2025-08-04 RX ADMIN — ALLOPURINOL 300 MG: 300 TABLET ORAL at 08:47

## 2025-08-04 RX ADMIN — ASPIRIN 325 MG: 325 TABLET, COATED ORAL at 08:48

## 2025-08-04 RX ADMIN — ACETAMINOPHEN 650 MG: 325 TABLET ORAL at 18:47

## 2025-08-04 RX ADMIN — Medication 200 MG: at 08:46

## 2025-08-04 RX ADMIN — OLANZAPINE 15 MG: 15 TABLET, FILM COATED ORAL at 21:16

## 2025-08-04 RX ADMIN — FERROUS SULFATE TAB 325 MG (65 MG ELEMENTAL FE) 325 MG: 325 (65 FE) TAB at 08:48

## 2025-08-04 RX ADMIN — OXYBUTYNIN CHLORIDE 30 MG: 10 TABLET, EXTENDED RELEASE ORAL at 08:48

## 2025-08-04 RX ADMIN — ENOXAPARIN SODIUM 150 MG: 150 INJECTION SUBCUTANEOUS at 21:16

## 2025-08-04 RX ADMIN — PRAZOSIN HYDROCHLORIDE 3 MG: 2 CAPSULE ORAL at 21:16

## 2025-08-04 RX ADMIN — RIFAMPIN 300 MG: 300 CAPSULE ORAL at 21:16

## 2025-08-04 RX ADMIN — DAPTOMYCIN 1000 MG: 500 INJECTION, POWDER, LYOPHILIZED, FOR SOLUTION INTRAVENOUS at 21:29

## 2025-08-04 RX ADMIN — PANTOPRAZOLE SODIUM 40 MG: 40 TABLET, DELAYED RELEASE ORAL at 08:47

## 2025-08-04 RX ADMIN — DULOXETINE HYDROCHLORIDE 120 MG: 60 CAPSULE, DELAYED RELEASE PELLETS ORAL at 08:47

## 2025-08-04 RX ADMIN — RIFAMPIN 300 MG: 300 CAPSULE ORAL at 08:47

## 2025-08-04 RX ADMIN — ENOXAPARIN SODIUM 150 MG: 150 INJECTION SUBCUTANEOUS at 12:18

## 2025-08-04 RX ADMIN — WARFARIN SODIUM 10 MG: 5 TABLET ORAL at 18:21

## 2025-08-04 RX ADMIN — OXYCODONE HYDROCHLORIDE 10 MG: 5 TABLET ORAL at 18:47

## 2025-08-04 RX ADMIN — MIRABEGRON 50 MG: 25 TABLET, FILM COATED, EXTENDED RELEASE ORAL at 08:48

## 2025-08-04 ASSESSMENT — ACTIVITIES OF DAILY LIVING (ADL)
ADLS_ACUITY_SCORE: 38
ADLS_ACUITY_SCORE: 39
ADLS_ACUITY_SCORE: 39
ADLS_ACUITY_SCORE: 38
ADLS_ACUITY_SCORE: 39
ADLS_ACUITY_SCORE: 38
ADLS_ACUITY_SCORE: 38
ADLS_ACUITY_SCORE: 39
ADLS_ACUITY_SCORE: 38
ADLS_ACUITY_SCORE: 39
ADLS_ACUITY_SCORE: 38
ADLS_ACUITY_SCORE: 39
ADLS_ACUITY_SCORE: 38
ADLS_ACUITY_SCORE: 39
ADLS_ACUITY_SCORE: 39

## 2025-08-05 ENCOUNTER — APPOINTMENT (OUTPATIENT)
Dept: OCCUPATIONAL THERAPY | Facility: SKILLED NURSING FACILITY | Age: 45
DRG: 559 | End: 2025-08-05
Attending: INTERNAL MEDICINE
Payer: COMMERCIAL

## 2025-08-05 LAB
HOLD SPECIMEN: NORMAL
HOLD SPECIMEN: NORMAL
INR PPP: 1.06 (ref 0.85–1.15)
LMWH PPP CHRO-ACNC: 1.23 IU/ML (ref ?–2)
PROTHROMBIN TIME: 14.2 SECONDS (ref 11.8–14.8)

## 2025-08-05 PROCEDURE — 999N000157 HC STATISTIC RCP TIME EA 10 MIN

## 2025-08-05 PROCEDURE — 85520 HEPARIN ASSAY: CPT | Performed by: INTERNAL MEDICINE

## 2025-08-05 PROCEDURE — 94660 CPAP INITIATION&MGMT: CPT

## 2025-08-05 PROCEDURE — 250N000013 HC RX MED GY IP 250 OP 250 PS 637: Performed by: INTERNAL MEDICINE

## 2025-08-05 PROCEDURE — 36415 COLL VENOUS BLD VENIPUNCTURE: CPT | Performed by: INTERNAL MEDICINE

## 2025-08-05 PROCEDURE — 250N000011 HC RX IP 250 OP 636: Performed by: INTERNAL MEDICINE

## 2025-08-05 PROCEDURE — 85610 PROTHROMBIN TIME: CPT | Performed by: INTERNAL MEDICINE

## 2025-08-05 PROCEDURE — 120N000009 HC R&B SNF

## 2025-08-05 PROCEDURE — 97530 THERAPEUTIC ACTIVITIES: CPT | Mod: GO

## 2025-08-05 PROCEDURE — 250N000012 HC RX MED GY IP 250 OP 636 PS 637: Performed by: INTERNAL MEDICINE

## 2025-08-05 PROCEDURE — 258N000003 HC RX IP 258 OP 636: Performed by: INTERNAL MEDICINE

## 2025-08-05 RX ORDER — PANTOPRAZOLE SODIUM 40 MG/1
40 TABLET, DELAYED RELEASE ORAL
Status: DISCONTINUED | OUTPATIENT
Start: 2025-08-06 | End: 2025-08-10 | Stop reason: HOSPADM

## 2025-08-05 RX ORDER — ENOXAPARIN SODIUM 150 MG/ML
1 INJECTION SUBCUTANEOUS EVERY 12 HOURS
Status: DISCONTINUED | OUTPATIENT
Start: 2025-08-05 | End: 2025-08-06

## 2025-08-05 RX ORDER — WARFARIN SODIUM 5 MG/1
10 TABLET ORAL
Status: COMPLETED | OUTPATIENT
Start: 2025-08-05 | End: 2025-08-05

## 2025-08-05 RX ORDER — HYDROMORPHONE HYDROCHLORIDE 2 MG/1
4 TABLET ORAL EVERY 4 HOURS PRN
Refills: 0 | Status: DISCONTINUED | OUTPATIENT
Start: 2025-08-05 | End: 2025-08-09

## 2025-08-05 RX ORDER — PREDNISONE 20 MG/1
40 TABLET ORAL DAILY
Status: DISCONTINUED | OUTPATIENT
Start: 2025-08-05 | End: 2025-08-08

## 2025-08-05 RX ADMIN — PRAZOSIN HYDROCHLORIDE 3 MG: 2 CAPSULE ORAL at 21:29

## 2025-08-05 RX ADMIN — PREDNISONE 40 MG: 20 TABLET ORAL at 16:02

## 2025-08-05 RX ADMIN — MIRABEGRON 50 MG: 25 TABLET, FILM COATED, EXTENDED RELEASE ORAL at 07:51

## 2025-08-05 RX ADMIN — Medication 200 MG: at 07:50

## 2025-08-05 RX ADMIN — WARFARIN SODIUM 10 MG: 5 TABLET ORAL at 18:08

## 2025-08-05 RX ADMIN — DULOXETINE HYDROCHLORIDE 120 MG: 60 CAPSULE, DELAYED RELEASE PELLETS ORAL at 07:51

## 2025-08-05 RX ADMIN — ACETAMINOPHEN 650 MG: 325 TABLET ORAL at 07:57

## 2025-08-05 RX ADMIN — FERROUS SULFATE TAB 325 MG (65 MG ELEMENTAL FE) 325 MG: 325 (65 FE) TAB at 07:51

## 2025-08-05 RX ADMIN — OXYCODONE HYDROCHLORIDE 10 MG: 5 TABLET ORAL at 07:57

## 2025-08-05 RX ADMIN — PANTOPRAZOLE SODIUM 40 MG: 40 TABLET, DELAYED RELEASE ORAL at 07:50

## 2025-08-05 RX ADMIN — OLANZAPINE 15 MG: 15 TABLET, FILM COATED ORAL at 21:30

## 2025-08-05 RX ADMIN — OXYBUTYNIN CHLORIDE 30 MG: 10 TABLET, EXTENDED RELEASE ORAL at 07:51

## 2025-08-05 RX ADMIN — HYDROMORPHONE HYDROCHLORIDE 4 MG: 2 TABLET ORAL at 22:31

## 2025-08-05 RX ADMIN — SENNOSIDES AND DOCUSATE SODIUM 2 TABLET: 50; 8.6 TABLET ORAL at 07:50

## 2025-08-05 RX ADMIN — ENOXAPARIN SODIUM 150 MG: 150 INJECTION SUBCUTANEOUS at 09:25

## 2025-08-05 RX ADMIN — OXYCODONE HYDROCHLORIDE 10 MG: 5 TABLET ORAL at 14:15

## 2025-08-05 RX ADMIN — ENOXAPARIN SODIUM 150 MG: 150 INJECTION SUBCUTANEOUS at 21:32

## 2025-08-05 RX ADMIN — DAPTOMYCIN 1000 MG: 500 INJECTION, POWDER, LYOPHILIZED, FOR SOLUTION INTRAVENOUS at 21:33

## 2025-08-05 RX ADMIN — RIFAMPIN 300 MG: 300 CAPSULE ORAL at 21:30

## 2025-08-05 RX ADMIN — ALLOPURINOL 300 MG: 300 TABLET ORAL at 07:50

## 2025-08-05 RX ADMIN — RIFAMPIN 300 MG: 300 CAPSULE ORAL at 09:25

## 2025-08-05 RX ADMIN — HYDROMORPHONE HYDROCHLORIDE 4 MG: 2 TABLET ORAL at 18:14

## 2025-08-05 RX ADMIN — ACETAMINOPHEN 650 MG: 325 TABLET ORAL at 14:15

## 2025-08-05 ASSESSMENT — ACTIVITIES OF DAILY LIVING (ADL)
ADLS_ACUITY_SCORE: 39

## 2025-08-06 ENCOUNTER — APPOINTMENT (OUTPATIENT)
Dept: PHYSICAL THERAPY | Facility: SKILLED NURSING FACILITY | Age: 45
DRG: 559 | End: 2025-08-06
Attending: INTERNAL MEDICINE
Payer: COMMERCIAL

## 2025-08-06 ENCOUNTER — TELEPHONE (OUTPATIENT)
Dept: INFECTIOUS DISEASES | Facility: CLINIC | Age: 45
End: 2025-08-06
Payer: COMMERCIAL

## 2025-08-06 ENCOUNTER — APPOINTMENT (OUTPATIENT)
Dept: OCCUPATIONAL THERAPY | Facility: SKILLED NURSING FACILITY | Age: 45
DRG: 559 | End: 2025-08-06
Attending: INTERNAL MEDICINE
Payer: COMMERCIAL

## 2025-08-06 LAB
FLUAV RNA SPEC QL NAA+PROBE: NEGATIVE
FLUBV RNA RESP QL NAA+PROBE: NEGATIVE
HOLD SPECIMEN: NORMAL
INR PPP: 1.54 (ref 0.85–1.15)
PROTHROMBIN TIME: 18.9 SECONDS (ref 11.8–14.8)
RSV RNA SPEC NAA+PROBE: NEGATIVE
SARS-COV-2 RNA RESP QL NAA+PROBE: NEGATIVE

## 2025-08-06 PROCEDURE — 97535 SELF CARE MNGMENT TRAINING: CPT | Mod: GO

## 2025-08-06 PROCEDURE — 250N000011 HC RX IP 250 OP 636: Performed by: INTERNAL MEDICINE

## 2025-08-06 PROCEDURE — 250N000013 HC RX MED GY IP 250 OP 250 PS 637: Performed by: INTERNAL MEDICINE

## 2025-08-06 PROCEDURE — 120N000009 HC R&B SNF

## 2025-08-06 PROCEDURE — 258N000003 HC RX IP 258 OP 636: Performed by: INTERNAL MEDICINE

## 2025-08-06 PROCEDURE — 97110 THERAPEUTIC EXERCISES: CPT | Mod: GP

## 2025-08-06 PROCEDURE — 97530 THERAPEUTIC ACTIVITIES: CPT | Mod: GP

## 2025-08-06 PROCEDURE — 87637 SARSCOV2&INF A&B&RSV AMP PRB: CPT | Performed by: STUDENT IN AN ORGANIZED HEALTH CARE EDUCATION/TRAINING PROGRAM

## 2025-08-06 PROCEDURE — 250N000012 HC RX MED GY IP 250 OP 636 PS 637: Performed by: INTERNAL MEDICINE

## 2025-08-06 PROCEDURE — 85610 PROTHROMBIN TIME: CPT | Performed by: INTERNAL MEDICINE

## 2025-08-06 PROCEDURE — 36415 COLL VENOUS BLD VENIPUNCTURE: CPT | Performed by: INTERNAL MEDICINE

## 2025-08-06 RX ORDER — ENOXAPARIN SODIUM 150 MG/ML
120 INJECTION SUBCUTANEOUS EVERY 12 HOURS
Status: DISCONTINUED | OUTPATIENT
Start: 2025-08-06 | End: 2025-08-08

## 2025-08-06 RX ORDER — WARFARIN SODIUM 5 MG/1
10 TABLET ORAL
Status: COMPLETED | OUTPATIENT
Start: 2025-08-06 | End: 2025-08-06

## 2025-08-06 RX ADMIN — FERROUS SULFATE TAB 325 MG (65 MG ELEMENTAL FE) 325 MG: 325 (65 FE) TAB at 09:05

## 2025-08-06 RX ADMIN — WARFARIN SODIUM 10 MG: 5 TABLET ORAL at 17:41

## 2025-08-06 RX ADMIN — HYDROMORPHONE HYDROCHLORIDE 4 MG: 2 TABLET ORAL at 17:42

## 2025-08-06 RX ADMIN — SENNOSIDES AND DOCUSATE SODIUM 2 TABLET: 50; 8.6 TABLET ORAL at 09:04

## 2025-08-06 RX ADMIN — HYDROMORPHONE HYDROCHLORIDE 4 MG: 2 TABLET ORAL at 09:03

## 2025-08-06 RX ADMIN — ENOXAPARIN SODIUM 150 MG: 150 INJECTION SUBCUTANEOUS at 09:03

## 2025-08-06 RX ADMIN — ENOXAPARIN SODIUM 120 MG: 120 INJECTION SUBCUTANEOUS at 22:03

## 2025-08-06 RX ADMIN — OXYBUTYNIN CHLORIDE 30 MG: 10 TABLET, EXTENDED RELEASE ORAL at 09:05

## 2025-08-06 RX ADMIN — HYDROMORPHONE HYDROCHLORIDE 4 MG: 2 TABLET ORAL at 21:57

## 2025-08-06 RX ADMIN — OLANZAPINE 15 MG: 15 TABLET, FILM COATED ORAL at 21:57

## 2025-08-06 RX ADMIN — PRAZOSIN HYDROCHLORIDE 3 MG: 2 CAPSULE ORAL at 21:57

## 2025-08-06 RX ADMIN — RIFAMPIN 300 MG: 300 CAPSULE ORAL at 09:04

## 2025-08-06 RX ADMIN — Medication 200 MG: at 09:04

## 2025-08-06 RX ADMIN — DULOXETINE HYDROCHLORIDE 120 MG: 60 CAPSULE, DELAYED RELEASE PELLETS ORAL at 09:05

## 2025-08-06 RX ADMIN — PREDNISONE 40 MG: 20 TABLET ORAL at 09:04

## 2025-08-06 RX ADMIN — ALLOPURINOL 300 MG: 300 TABLET ORAL at 09:04

## 2025-08-06 RX ADMIN — PANTOPRAZOLE SODIUM 40 MG: 40 TABLET, DELAYED RELEASE ORAL at 09:05

## 2025-08-06 RX ADMIN — DAPTOMYCIN 1000 MG: 500 INJECTION, POWDER, LYOPHILIZED, FOR SOLUTION INTRAVENOUS at 21:54

## 2025-08-06 RX ADMIN — RIFAMPIN 300 MG: 300 CAPSULE ORAL at 21:57

## 2025-08-06 RX ADMIN — MIRABEGRON 50 MG: 25 TABLET, FILM COATED, EXTENDED RELEASE ORAL at 09:04

## 2025-08-06 ASSESSMENT — ACTIVITIES OF DAILY LIVING (ADL)
ADLS_ACUITY_SCORE: 41
ADLS_ACUITY_SCORE: 39
ADLS_ACUITY_SCORE: 39
ADLS_ACUITY_SCORE: 41
ADLS_ACUITY_SCORE: 41
ADLS_ACUITY_SCORE: 39
ADLS_ACUITY_SCORE: 41
ADLS_ACUITY_SCORE: 39
ADLS_ACUITY_SCORE: 39
ADLS_ACUITY_SCORE: 41
ADLS_ACUITY_SCORE: 39

## 2025-08-07 ENCOUNTER — APPOINTMENT (OUTPATIENT)
Dept: PHYSICAL THERAPY | Facility: SKILLED NURSING FACILITY | Age: 45
DRG: 559 | End: 2025-08-07
Attending: INTERNAL MEDICINE
Payer: COMMERCIAL

## 2025-08-07 ENCOUNTER — APPOINTMENT (OUTPATIENT)
Dept: OCCUPATIONAL THERAPY | Facility: SKILLED NURSING FACILITY | Age: 45
DRG: 559 | End: 2025-08-07
Attending: INTERNAL MEDICINE
Payer: COMMERCIAL

## 2025-08-07 VITALS
HEIGHT: 72 IN | TEMPERATURE: 98.4 F | SYSTOLIC BLOOD PRESSURE: 136 MMHG | RESPIRATION RATE: 20 BRPM | WEIGHT: 315 LBS | HEART RATE: 88 BPM | DIASTOLIC BLOOD PRESSURE: 77 MMHG | OXYGEN SATURATION: 95 % | BODY MASS INDEX: 42.66 KG/M2

## 2025-08-07 LAB
ALBUMIN SERPL BCG-MCNC: 2.8 G/DL (ref 3.5–5.2)
ALP SERPL-CCNC: 57 U/L (ref 40–150)
ALT SERPL W P-5'-P-CCNC: 12 U/L (ref 0–70)
ALT SERPL W P-5'-P-CCNC: 12 U/L (ref 0–70)
AST SERPL W P-5'-P-CCNC: 17 U/L (ref 0–45)
AST SERPL W P-5'-P-CCNC: 17 U/L (ref 0–45)
BILIRUB DIRECT SERPL-MCNC: <0.08 MG/DL (ref 0–0.3)
BILIRUB SERPL-MCNC: <0.2 MG/DL
CREAT SERPL-MCNC: 1.38 MG/DL (ref 0.67–1.17)
EGFRCR SERPLBLD CKD-EPI 2021: 65 ML/MIN/1.73M2
INR PPP: 2.05 (ref 0.85–1.15)
MCV RBC AUTO: 90 FL (ref 78–100)
PLATELET # BLD AUTO: 266 10E3/UL (ref 150–450)
PROT SERPL-MCNC: 6.5 G/DL (ref 6.4–8.3)
PROTHROMBIN TIME: 23.6 SECONDS (ref 11.8–14.8)

## 2025-08-07 PROCEDURE — 97530 THERAPEUTIC ACTIVITIES: CPT | Mod: GP

## 2025-08-07 PROCEDURE — 250N000011 HC RX IP 250 OP 636: Performed by: INTERNAL MEDICINE

## 2025-08-07 PROCEDURE — 82565 ASSAY OF CREATININE: CPT | Performed by: INTERNAL MEDICINE

## 2025-08-07 PROCEDURE — 85610 PROTHROMBIN TIME: CPT | Performed by: INTERNAL MEDICINE

## 2025-08-07 PROCEDURE — 258N000003 HC RX IP 258 OP 636: Performed by: INTERNAL MEDICINE

## 2025-08-07 PROCEDURE — 85049 AUTOMATED PLATELET COUNT: CPT | Performed by: INTERNAL MEDICINE

## 2025-08-07 PROCEDURE — 36415 COLL VENOUS BLD VENIPUNCTURE: CPT | Performed by: INTERNAL MEDICINE

## 2025-08-07 PROCEDURE — 250N000013 HC RX MED GY IP 250 OP 250 PS 637: Performed by: INTERNAL MEDICINE

## 2025-08-07 PROCEDURE — 84460 ALANINE AMINO (ALT) (SGPT): CPT | Performed by: INTERNAL MEDICINE

## 2025-08-07 PROCEDURE — 120N000009 HC R&B SNF

## 2025-08-07 PROCEDURE — 82040 ASSAY OF SERUM ALBUMIN: CPT | Performed by: INTERNAL MEDICINE

## 2025-08-07 PROCEDURE — 97535 SELF CARE MNGMENT TRAINING: CPT | Mod: GO

## 2025-08-07 PROCEDURE — 999N000150 HC STATISTIC PT MED CONFERENCE < 30 MIN

## 2025-08-07 PROCEDURE — 250N000012 HC RX MED GY IP 250 OP 636 PS 637: Performed by: INTERNAL MEDICINE

## 2025-08-07 RX ADMIN — ENOXAPARIN SODIUM 120 MG: 120 INJECTION SUBCUTANEOUS at 21:03

## 2025-08-07 RX ADMIN — RIFAMPIN 300 MG: 300 CAPSULE ORAL at 09:04

## 2025-08-07 RX ADMIN — HYDROMORPHONE HYDROCHLORIDE 4 MG: 2 TABLET ORAL at 17:39

## 2025-08-07 RX ADMIN — WARFARIN SODIUM 7.5 MG: 5 TABLET ORAL at 17:40

## 2025-08-07 RX ADMIN — DAPTOMYCIN 1000 MG: 500 INJECTION, POWDER, LYOPHILIZED, FOR SOLUTION INTRAVENOUS at 21:04

## 2025-08-07 RX ADMIN — SENNOSIDES AND DOCUSATE SODIUM 2 TABLET: 50; 8.6 TABLET ORAL at 21:02

## 2025-08-07 RX ADMIN — Medication 200 MG: at 09:02

## 2025-08-07 RX ADMIN — PANTOPRAZOLE SODIUM 40 MG: 40 TABLET, DELAYED RELEASE ORAL at 09:04

## 2025-08-07 RX ADMIN — PRAZOSIN HYDROCHLORIDE 3 MG: 2 CAPSULE ORAL at 21:02

## 2025-08-07 RX ADMIN — FERROUS SULFATE TAB 325 MG (65 MG ELEMENTAL FE) 325 MG: 325 (65 FE) TAB at 09:03

## 2025-08-07 RX ADMIN — OLANZAPINE 15 MG: 15 TABLET, FILM COATED ORAL at 21:02

## 2025-08-07 RX ADMIN — PREDNISONE 40 MG: 20 TABLET ORAL at 09:04

## 2025-08-07 RX ADMIN — ENOXAPARIN SODIUM 120 MG: 120 INJECTION SUBCUTANEOUS at 09:04

## 2025-08-07 RX ADMIN — HYDROMORPHONE HYDROCHLORIDE 4 MG: 2 TABLET ORAL at 09:03

## 2025-08-07 RX ADMIN — ALLOPURINOL 300 MG: 300 TABLET ORAL at 09:02

## 2025-08-07 RX ADMIN — RIFAMPIN 300 MG: 300 CAPSULE ORAL at 21:02

## 2025-08-07 RX ADMIN — DULOXETINE HYDROCHLORIDE 120 MG: 60 CAPSULE, DELAYED RELEASE PELLETS ORAL at 09:02

## 2025-08-07 RX ADMIN — MIRABEGRON 50 MG: 25 TABLET, FILM COATED, EXTENDED RELEASE ORAL at 09:03

## 2025-08-07 RX ADMIN — OXYBUTYNIN CHLORIDE 30 MG: 10 TABLET, EXTENDED RELEASE ORAL at 09:07

## 2025-08-07 ASSESSMENT — ACTIVITIES OF DAILY LIVING (ADL)
ADLS_ACUITY_SCORE: 41

## 2025-08-08 ENCOUNTER — APPOINTMENT (OUTPATIENT)
Dept: PHYSICAL THERAPY | Facility: SKILLED NURSING FACILITY | Age: 45
DRG: 559 | End: 2025-08-08
Attending: INTERNAL MEDICINE
Payer: COMMERCIAL

## 2025-08-08 ENCOUNTER — APPOINTMENT (OUTPATIENT)
Dept: OCCUPATIONAL THERAPY | Facility: SKILLED NURSING FACILITY | Age: 45
DRG: 559 | End: 2025-08-08
Attending: INTERNAL MEDICINE
Payer: COMMERCIAL

## 2025-08-08 LAB
HOLD SPECIMEN: NORMAL
INR PPP: 2.12 (ref 0.85–1.15)
PROTHROMBIN TIME: 24.2 SECONDS (ref 11.8–14.8)

## 2025-08-08 PROCEDURE — 97530 THERAPEUTIC ACTIVITIES: CPT | Mod: GP

## 2025-08-08 PROCEDURE — 99309 SBSQ NF CARE MODERATE MDM 30: CPT | Performed by: INTERNAL MEDICINE

## 2025-08-08 PROCEDURE — 85610 PROTHROMBIN TIME: CPT | Performed by: INTERNAL MEDICINE

## 2025-08-08 PROCEDURE — 250N000012 HC RX MED GY IP 250 OP 636 PS 637: Performed by: INTERNAL MEDICINE

## 2025-08-08 PROCEDURE — 250N000013 HC RX MED GY IP 250 OP 250 PS 637: Performed by: INTERNAL MEDICINE

## 2025-08-08 PROCEDURE — 022N000001 HC SNF RUG CODE OPNP

## 2025-08-08 PROCEDURE — L4360 PNEUMAT WALKING BOOT PRE CST: HCPCS

## 2025-08-08 PROCEDURE — 250N000011 HC RX IP 250 OP 636: Performed by: INTERNAL MEDICINE

## 2025-08-08 PROCEDURE — 120N000009 HC R&B SNF

## 2025-08-08 PROCEDURE — 36592 COLLECT BLOOD FROM PICC: CPT | Performed by: INTERNAL MEDICINE

## 2025-08-08 PROCEDURE — 97535 SELF CARE MNGMENT TRAINING: CPT | Mod: GO

## 2025-08-08 PROCEDURE — 258N000003 HC RX IP 258 OP 636: Performed by: INTERNAL MEDICINE

## 2025-08-08 RX ORDER — WARFARIN SODIUM 5 MG/1
10 TABLET ORAL
Status: COMPLETED | OUTPATIENT
Start: 2025-08-08 | End: 2025-08-08

## 2025-08-08 RX ADMIN — DULOXETINE HYDROCHLORIDE 120 MG: 60 CAPSULE, DELAYED RELEASE PELLETS ORAL at 09:09

## 2025-08-08 RX ADMIN — ALLOPURINOL 300 MG: 300 TABLET ORAL at 09:09

## 2025-08-08 RX ADMIN — DAPTOMYCIN 1000 MG: 500 INJECTION, POWDER, LYOPHILIZED, FOR SOLUTION INTRAVENOUS at 21:40

## 2025-08-08 RX ADMIN — PANTOPRAZOLE SODIUM 40 MG: 40 TABLET, DELAYED RELEASE ORAL at 09:09

## 2025-08-08 RX ADMIN — ACETAMINOPHEN 650 MG: 325 TABLET ORAL at 23:01

## 2025-08-08 RX ADMIN — HYDROMORPHONE HYDROCHLORIDE 4 MG: 2 TABLET ORAL at 09:28

## 2025-08-08 RX ADMIN — PRAZOSIN HYDROCHLORIDE 3 MG: 2 CAPSULE ORAL at 21:34

## 2025-08-08 RX ADMIN — ACETAMINOPHEN 650 MG: 325 TABLET ORAL at 09:28

## 2025-08-08 RX ADMIN — HYDROMORPHONE HYDROCHLORIDE 4 MG: 2 TABLET ORAL at 18:10

## 2025-08-08 RX ADMIN — HYDROMORPHONE HYDROCHLORIDE 4 MG: 2 TABLET ORAL at 23:01

## 2025-08-08 RX ADMIN — ACETAMINOPHEN 650 MG: 325 TABLET ORAL at 13:48

## 2025-08-08 RX ADMIN — FERROUS SULFATE TAB 325 MG (65 MG ELEMENTAL FE) 325 MG: 325 (65 FE) TAB at 09:10

## 2025-08-08 RX ADMIN — Medication 200 MG: at 09:09

## 2025-08-08 RX ADMIN — RIFAMPIN 300 MG: 300 CAPSULE ORAL at 09:10

## 2025-08-08 RX ADMIN — PREDNISONE 40 MG: 20 TABLET ORAL at 09:09

## 2025-08-08 RX ADMIN — WARFARIN SODIUM 10 MG: 5 TABLET ORAL at 18:03

## 2025-08-08 RX ADMIN — OLANZAPINE 15 MG: 15 TABLET, FILM COATED ORAL at 21:34

## 2025-08-08 RX ADMIN — MIRABEGRON 50 MG: 25 TABLET, FILM COATED, EXTENDED RELEASE ORAL at 09:09

## 2025-08-08 RX ADMIN — ACETAMINOPHEN 650 MG: 325 TABLET ORAL at 18:10

## 2025-08-08 RX ADMIN — OXYBUTYNIN CHLORIDE 30 MG: 10 TABLET, EXTENDED RELEASE ORAL at 09:09

## 2025-08-08 RX ADMIN — HYDROMORPHONE HYDROCHLORIDE 4 MG: 2 TABLET ORAL at 13:48

## 2025-08-08 RX ADMIN — RIFAMPIN 300 MG: 300 CAPSULE ORAL at 21:34

## 2025-08-08 ASSESSMENT — ACTIVITIES OF DAILY LIVING (ADL)
ADLS_ACUITY_SCORE: 41
ADLS_ACUITY_SCORE: 41
ADLS_ACUITY_SCORE: 40
ADLS_ACUITY_SCORE: 41
ADLS_ACUITY_SCORE: 40
ADLS_ACUITY_SCORE: 40
ADLS_ACUITY_SCORE: 41
ADLS_ACUITY_SCORE: 40
ADLS_ACUITY_SCORE: 41
ADLS_ACUITY_SCORE: 40
ADLS_ACUITY_SCORE: 40
ADLS_ACUITY_SCORE: 41
ADLS_ACUITY_SCORE: 40
ADLS_ACUITY_SCORE: 41
ADLS_ACUITY_SCORE: 41

## 2025-08-09 LAB
HCT VFR BLD AUTO: 22.5 % (ref 40–53)
HGB BLD-MCNC: 7.4 G/DL (ref 13.3–17.7)
HOLD SPECIMEN: NORMAL
INR PPP: 2.37 (ref 0.85–1.15)
MCV RBC AUTO: 87 FL (ref 78–100)
PROTHROMBIN TIME: 26.4 SECONDS (ref 11.8–14.8)

## 2025-08-09 PROCEDURE — 36592 COLLECT BLOOD FROM PICC: CPT | Performed by: INTERNAL MEDICINE

## 2025-08-09 PROCEDURE — 250N000011 HC RX IP 250 OP 636: Performed by: INTERNAL MEDICINE

## 2025-08-09 PROCEDURE — 250N000013 HC RX MED GY IP 250 OP 250 PS 637: Performed by: INTERNAL MEDICINE

## 2025-08-09 PROCEDURE — 36592 COLLECT BLOOD FROM PICC: CPT | Performed by: STUDENT IN AN ORGANIZED HEALTH CARE EDUCATION/TRAINING PROGRAM

## 2025-08-09 PROCEDURE — 85610 PROTHROMBIN TIME: CPT | Performed by: INTERNAL MEDICINE

## 2025-08-09 PROCEDURE — 85018 HEMOGLOBIN: CPT | Performed by: STUDENT IN AN ORGANIZED HEALTH CARE EDUCATION/TRAINING PROGRAM

## 2025-08-09 PROCEDURE — 258N000003 HC RX IP 258 OP 636: Performed by: INTERNAL MEDICINE

## 2025-08-09 PROCEDURE — 120N000009 HC R&B SNF

## 2025-08-09 RX ORDER — WARFARIN SODIUM 7.5 MG/1
7.5 TABLET ORAL
Status: COMPLETED | OUTPATIENT
Start: 2025-08-09 | End: 2025-08-09

## 2025-08-09 RX ORDER — HYDROMORPHONE HYDROCHLORIDE 2 MG/1
2-4 TABLET ORAL EVERY 4 HOURS PRN
Refills: 0 | Status: DISCONTINUED | OUTPATIENT
Start: 2025-08-09 | End: 2025-08-09

## 2025-08-09 RX ORDER — HYDROMORPHONE HYDROCHLORIDE 2 MG/1
2 TABLET ORAL EVERY 4 HOURS PRN
Refills: 0 | Status: DISCONTINUED | OUTPATIENT
Start: 2025-08-09 | End: 2025-08-10 | Stop reason: HOSPADM

## 2025-08-09 RX ORDER — HYDROMORPHONE HYDROCHLORIDE 2 MG/1
4 TABLET ORAL EVERY 4 HOURS PRN
Refills: 0 | Status: DISCONTINUED | OUTPATIENT
Start: 2025-08-09 | End: 2025-08-10 | Stop reason: HOSPADM

## 2025-08-09 RX ADMIN — ACETAMINOPHEN 650 MG: 325 TABLET ORAL at 14:16

## 2025-08-09 RX ADMIN — DULOXETINE HYDROCHLORIDE 120 MG: 60 CAPSULE, DELAYED RELEASE PELLETS ORAL at 09:14

## 2025-08-09 RX ADMIN — HYDROMORPHONE HYDROCHLORIDE 4 MG: 2 TABLET ORAL at 14:16

## 2025-08-09 RX ADMIN — RIFAMPIN 300 MG: 300 CAPSULE ORAL at 21:09

## 2025-08-09 RX ADMIN — ALLOPURINOL 300 MG: 300 TABLET ORAL at 09:14

## 2025-08-09 RX ADMIN — PANTOPRAZOLE SODIUM 40 MG: 40 TABLET, DELAYED RELEASE ORAL at 09:14

## 2025-08-09 RX ADMIN — DAPTOMYCIN 1000 MG: 500 INJECTION, POWDER, LYOPHILIZED, FOR SOLUTION INTRAVENOUS at 21:13

## 2025-08-09 RX ADMIN — RIFAMPIN 300 MG: 300 CAPSULE ORAL at 09:14

## 2025-08-09 RX ADMIN — FERROUS SULFATE TAB 325 MG (65 MG ELEMENTAL FE) 325 MG: 325 (65 FE) TAB at 09:14

## 2025-08-09 RX ADMIN — PRAZOSIN HYDROCHLORIDE 3 MG: 2 CAPSULE ORAL at 21:11

## 2025-08-09 RX ADMIN — WARFARIN SODIUM 7.5 MG: 7.5 TABLET ORAL at 18:19

## 2025-08-09 RX ADMIN — OLANZAPINE 15 MG: 15 TABLET, FILM COATED ORAL at 21:11

## 2025-08-09 RX ADMIN — Medication 200 MG: at 09:14

## 2025-08-09 RX ADMIN — OXYBUTYNIN CHLORIDE 30 MG: 10 TABLET, EXTENDED RELEASE ORAL at 09:14

## 2025-08-09 RX ADMIN — MIRABEGRON 50 MG: 25 TABLET, FILM COATED, EXTENDED RELEASE ORAL at 09:14

## 2025-08-09 ASSESSMENT — ACTIVITIES OF DAILY LIVING (ADL)
ADLS_ACUITY_SCORE: 41
ADLS_ACUITY_SCORE: 41
ADLS_ACUITY_SCORE: 40
ADLS_ACUITY_SCORE: 41
ADLS_ACUITY_SCORE: 41
ADLS_ACUITY_SCORE: 40
ADLS_ACUITY_SCORE: 41
ADLS_ACUITY_SCORE: 41
ADLS_ACUITY_SCORE: 40
ADLS_ACUITY_SCORE: 41
ADLS_ACUITY_SCORE: 40
ADLS_ACUITY_SCORE: 40
ADLS_ACUITY_SCORE: 41
ADLS_ACUITY_SCORE: 41
ADLS_ACUITY_SCORE: 40
ADLS_ACUITY_SCORE: 41

## 2025-08-10 ENCOUNTER — HOSPITAL ENCOUNTER (INPATIENT)
Facility: CLINIC | Age: 45
DRG: 177 | End: 2025-08-10
Attending: STUDENT IN AN ORGANIZED HEALTH CARE EDUCATION/TRAINING PROGRAM | Admitting: FAMILY MEDICINE
Payer: COMMERCIAL

## 2025-08-10 ENCOUNTER — APPOINTMENT (OUTPATIENT)
Dept: GENERAL RADIOLOGY | Facility: CLINIC | Age: 45
End: 2025-08-10
Attending: INTERNAL MEDICINE
Payer: COMMERCIAL

## 2025-08-10 VITALS
BODY MASS INDEX: 42.66 KG/M2 | SYSTOLIC BLOOD PRESSURE: 132 MMHG | RESPIRATION RATE: 20 BRPM | WEIGHT: 315 LBS | TEMPERATURE: 98.4 F | OXYGEN SATURATION: 93 % | HEART RATE: 101 BPM | DIASTOLIC BLOOD PRESSURE: 63 MMHG | HEIGHT: 72 IN

## 2025-08-10 DIAGNOSIS — E66.813 CLASS 3 SEVERE OBESITY DUE TO EXCESS CALORIES WITH SERIOUS COMORBIDITY AND BODY MASS INDEX (BMI) OF 50.0 TO 59.9 IN ADULT (H): ICD-10-CM

## 2025-08-10 DIAGNOSIS — K22.70 BARRETT'S ESOPHAGUS DETERMINED BY BIOPSY: ICD-10-CM

## 2025-08-10 DIAGNOSIS — M10.9 ACUTE GOUTY ARTHRITIS: ICD-10-CM

## 2025-08-10 DIAGNOSIS — L21.9 SEBORRHEIC DERMATITIS: ICD-10-CM
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
## 2025-08-10 DIAGNOSIS — E55.9 VITAMIN D DEFICIENCY: ICD-10-CM

## 2025-08-10 DIAGNOSIS — J18.9 HOSPITAL-ACQUIRED PNEUMONIA: ICD-10-CM

## 2025-08-10 DIAGNOSIS — D64.9 ANEMIA, UNSPECIFIED TYPE: ICD-10-CM

## 2025-08-10 DIAGNOSIS — B35.3 TINEA PEDIS OF BOTH FEET: ICD-10-CM

## 2025-08-10 DIAGNOSIS — J96.01 ACUTE HYPOXEMIC RESPIRATORY FAILURE (H): Primary | ICD-10-CM

## 2025-08-10 DIAGNOSIS — R45.86 MOOD CHANGE: ICD-10-CM

## 2025-08-10 DIAGNOSIS — J96.01 ACUTE RESPIRATORY FAILURE WITH HYPOXIA (H): ICD-10-CM

## 2025-08-10 DIAGNOSIS — E66.813 CLASS 3 SEVERE OBESITY WITH SERIOUS COMORBIDITY AND BODY MASS INDEX (BMI) OF 50.0 TO 59.9 IN ADULT (H): ICD-10-CM

## 2025-08-10 DIAGNOSIS — M96.830 POSTOPERATIVE HEMORRHAGE OF MUSCULOSKELETAL STRUCTURE FOLLOWING MUSCULOSKELETAL PROCEDURE: ICD-10-CM

## 2025-08-10 DIAGNOSIS — I10 ESSENTIAL HYPERTENSION: ICD-10-CM

## 2025-08-10 DIAGNOSIS — I82.621 ARM DVT (DEEP VENOUS THROMBOEMBOLISM), ACUTE, RIGHT (H): ICD-10-CM

## 2025-08-10 DIAGNOSIS — K59.01 SLOW TRANSIT CONSTIPATION: ICD-10-CM

## 2025-08-10 DIAGNOSIS — G47.33 OSA (OBSTRUCTIVE SLEEP APNEA): ICD-10-CM

## 2025-08-10 DIAGNOSIS — F33.3 SEVERE RECURRENT MAJOR DEPRESSIVE DISORDER WITH PSYCHOTIC FEATURES (H): ICD-10-CM

## 2025-08-10 DIAGNOSIS — T84.50XA INFECTION OF PROSTHETIC JOINT, INITIAL ENCOUNTER: ICD-10-CM

## 2025-08-10 DIAGNOSIS — I51.7 LEFT VENTRICULAR HYPERTROPHY: Chronic | ICD-10-CM

## 2025-08-10 DIAGNOSIS — N32.81 OVERACTIVE BLADDER: ICD-10-CM

## 2025-08-10 DIAGNOSIS — J82.81 EOSINOPHILIC PNEUMONIA (H): ICD-10-CM

## 2025-08-10 DIAGNOSIS — K29.80 DUODENITIS: ICD-10-CM

## 2025-08-10 DIAGNOSIS — Z96.641 S/P TOTAL RIGHT HIP ARTHROPLASTY: ICD-10-CM

## 2025-08-10 DIAGNOSIS — F33.9 RECURRENT MAJOR DEPRESSIVE DISORDER, REMISSION STATUS UNSPECIFIED: ICD-10-CM

## 2025-08-10 PROBLEM — R06.00 DYSPNEA: Status: ACTIVE | Noted: 2025-08-10

## 2025-08-10 LAB
ALBUMIN SERPL BCG-MCNC: 3 G/DL (ref 3.5–5.2)
ALP SERPL-CCNC: 49 U/L (ref 40–150)
ALT SERPL W P-5'-P-CCNC: 9 U/L (ref 0–70)
ANION GAP SERPL CALCULATED.3IONS-SCNC: 14 MMOL/L (ref 7–15)
AST SERPL W P-5'-P-CCNC: 14 U/L (ref 0–45)
BASE EXCESS BLDV CALC-SCNC: 1.3 MMOL/L (ref -3–3)
BILIRUB SERPL-MCNC: 0.5 MG/DL
BUN SERPL-MCNC: 11.1 MG/DL (ref 6–20)
C PNEUM DNA SPEC QL NAA+PROBE: NOT DETECTED
CALCIUM SERPL-MCNC: 8.5 MG/DL (ref 8.8–10.4)
CHLORIDE SERPL-SCNC: 101 MMOL/L (ref 98–107)
CREAT SERPL-MCNC: 1.26 MG/DL (ref 0.67–1.17)
EGFRCR SERPLBLD CKD-EPI 2021: 73 ML/MIN/1.73M2
ERYTHROCYTE [DISTWIDTH] IN BLOOD BY AUTOMATED COUNT: 16 % (ref 10–15)
FLUAV H1 2009 PAND RNA SPEC QL NAA+PROBE: NOT DETECTED
FLUAV H1 RNA SPEC QL NAA+PROBE: NOT DETECTED
FLUAV H3 RNA SPEC QL NAA+PROBE: NOT DETECTED
FLUAV RNA SPEC QL NAA+PROBE: NOT DETECTED
FLUBV RNA SPEC QL NAA+PROBE: NOT DETECTED
GLUCOSE SERPL-MCNC: 131 MG/DL (ref 70–99)
HADV DNA SPEC QL NAA+PROBE: NOT DETECTED
HCO3 BLDV-SCNC: 25 MMOL/L (ref 21–28)
HCO3 SERPL-SCNC: 22 MMOL/L (ref 22–29)
HCOV PNL SPEC NAA+PROBE: NOT DETECTED
HCT VFR BLD AUTO: 30.8 % (ref 40–53)
HGB BLD-MCNC: 9.9 G/DL (ref 13.3–17.7)
HMPV RNA SPEC QL NAA+PROBE: NOT DETECTED
HOLD SPECIMEN: NORMAL
HPIV1 RNA SPEC QL NAA+PROBE: NOT DETECTED
HPIV2 RNA SPEC QL NAA+PROBE: NOT DETECTED
HPIV3 RNA SPEC QL NAA+PROBE: NOT DETECTED
HPIV4 RNA SPEC QL NAA+PROBE: NOT DETECTED
INR PPP: 1.99 (ref 0.85–1.15)
L PNEUMO1 AG UR QL IA: NEGATIVE
LACTATE SERPL-SCNC: 1.4 MMOL/L (ref 0.7–2)
M PNEUMO DNA SPEC QL NAA+PROBE: NOT DETECTED
MCH RBC QN AUTO: 27.6 PG (ref 26.5–33)
MCHC RBC AUTO-ENTMCNC: 32.1 G/DL (ref 31.5–36.5)
MCV RBC AUTO: 86 FL (ref 78–100)
NT-PROBNP SERPL-MCNC: 680 PG/ML (ref 0–93)
O2/TOTAL GAS SETTING VFR VENT: 2 %
OXYHGB MFR BLDV: 77 % (ref 70–75)
PCO2 BLDV: 36 MM HG (ref 40–50)
PH BLDV: 7.46 [PH] (ref 7.32–7.43)
PLATELET # BLD AUTO: 207 10E3/UL (ref 150–450)
PO2 BLDV: 43 MM HG (ref 25–47)
POTASSIUM SERPL-SCNC: 4.2 MMOL/L (ref 3.4–5.3)
PROCALCITONIN SERPL IA-MCNC: 0.18 NG/ML
PROT SERPL-MCNC: 6.9 G/DL (ref 6.4–8.3)
PROTHROMBIN TIME: 23.1 SECONDS (ref 11.8–14.8)
RBC # BLD AUTO: 3.59 10E6/UL (ref 4.4–5.9)
RSV RNA SPEC QL NAA+PROBE: NOT DETECTED
RSV RNA SPEC QL NAA+PROBE: NOT DETECTED
RV+EV RNA SPEC QL NAA+PROBE: NOT DETECTED
S PNEUM AG SPEC QL: NEGATIVE
SAO2 % BLDV: 79.7 % (ref 70–75)
SARS-COV-2 RNA RESP QL NAA+PROBE: NEGATIVE
SODIUM SERPL-SCNC: 137 MMOL/L (ref 135–145)
SPECIMEN TYPE: NORMAL
TROPONIN T SERPL HS-MCNC: 27 NG/L
TROPONIN T SERPL HS-MCNC: 31 NG/L
TROPONIN T SERPL HS-MCNC: 31 NG/L
WBC # BLD AUTO: 9.3 10E3/UL (ref 4–11)

## 2025-08-10 PROCEDURE — 36415 COLL VENOUS BLD VENIPUNCTURE: CPT | Performed by: INTERNAL MEDICINE

## 2025-08-10 PROCEDURE — 85014 HEMATOCRIT: CPT | Performed by: INTERNAL MEDICINE

## 2025-08-10 PROCEDURE — 5A09557 ASSISTANCE WITH RESPIRATORY VENTILATION, GREATER THAN 96 CONSECUTIVE HOURS, CONTINUOUS POSITIVE AIRWAY PRESSURE: ICD-10-PCS | Performed by: FAMILY MEDICINE

## 2025-08-10 PROCEDURE — 84484 ASSAY OF TROPONIN QUANT: CPT | Performed by: INTERNAL MEDICINE

## 2025-08-10 PROCEDURE — 99223 1ST HOSP IP/OBS HIGH 75: CPT | Mod: AI

## 2025-08-10 PROCEDURE — 87040 BLOOD CULTURE FOR BACTERIA: CPT | Performed by: INTERNAL MEDICINE

## 2025-08-10 PROCEDURE — 94660 CPAP INITIATION&MGMT: CPT

## 2025-08-10 PROCEDURE — 83880 ASSAY OF NATRIURETIC PEPTIDE: CPT | Performed by: INTERNAL MEDICINE

## 2025-08-10 PROCEDURE — 87635 SARS-COV-2 COVID-19 AMP PRB: CPT | Performed by: INTERNAL MEDICINE

## 2025-08-10 PROCEDURE — 99316 NF DSCHRG MGMT 30 MIN+: CPT | Performed by: INTERNAL MEDICINE

## 2025-08-10 PROCEDURE — 83605 ASSAY OF LACTIC ACID: CPT | Performed by: INTERNAL MEDICINE

## 2025-08-10 PROCEDURE — 87633 RESP VIRUS 12-25 TARGETS: CPT | Performed by: INTERNAL MEDICINE

## 2025-08-10 PROCEDURE — 120N000002 HC R&B MED SURG/OB UMMC

## 2025-08-10 PROCEDURE — 85610 PROTHROMBIN TIME: CPT | Performed by: INTERNAL MEDICINE

## 2025-08-10 PROCEDURE — 250N000011 HC RX IP 250 OP 636

## 2025-08-10 PROCEDURE — 250N000013 HC RX MED GY IP 250 OP 250 PS 637: Performed by: INTERNAL MEDICINE

## 2025-08-10 PROCEDURE — G0463 HOSPITAL OUTPT CLINIC VISIT: HCPCS

## 2025-08-10 PROCEDURE — 93005 ELECTROCARDIOGRAM TRACING: CPT

## 2025-08-10 PROCEDURE — 82247 BILIRUBIN TOTAL: CPT | Performed by: INTERNAL MEDICINE

## 2025-08-10 PROCEDURE — 999N000157 HC STATISTIC RCP TIME EA 10 MIN

## 2025-08-10 PROCEDURE — 250N000011 HC RX IP 250 OP 636: Performed by: INTERNAL MEDICINE

## 2025-08-10 PROCEDURE — 250N000013 HC RX MED GY IP 250 OP 250 PS 637: Performed by: STUDENT IN AN ORGANIZED HEALTH CARE EDUCATION/TRAINING PROGRAM

## 2025-08-10 PROCEDURE — 82805 BLOOD GASES W/O2 SATURATION: CPT | Performed by: INTERNAL MEDICINE

## 2025-08-10 PROCEDURE — 84145 PROCALCITONIN (PCT): CPT | Performed by: INTERNAL MEDICINE

## 2025-08-10 PROCEDURE — 71046 X-RAY EXAM CHEST 2 VIEWS: CPT | Mod: 26 | Performed by: RADIOLOGY

## 2025-08-10 PROCEDURE — 87899 AGENT NOS ASSAY W/OPTIC: CPT | Performed by: INTERNAL MEDICINE

## 2025-08-10 PROCEDURE — 84484 ASSAY OF TROPONIN QUANT: CPT

## 2025-08-10 PROCEDURE — 71046 X-RAY EXAM CHEST 2 VIEWS: CPT

## 2025-08-10 PROCEDURE — 250N000013 HC RX MED GY IP 250 OP 250 PS 637

## 2025-08-10 RX ORDER — SODIUM CHLORIDE 9 MG/ML
INJECTION, SOLUTION INTRAVENOUS
Status: DISCONTINUED
Start: 2025-08-10 | End: 2025-08-10 | Stop reason: HOSPADM

## 2025-08-10 RX ORDER — PANTOPRAZOLE SODIUM 40 MG/1
40 TABLET, DELAYED RELEASE ORAL
Status: DISCONTINUED | OUTPATIENT
Start: 2025-08-11 | End: 2025-08-12

## 2025-08-10 RX ORDER — PIPERACILLIN SODIUM, TAZOBACTAM SODIUM 4; .5 G/20ML; G/20ML
4.5 INJECTION, POWDER, LYOPHILIZED, FOR SOLUTION INTRAVENOUS EVERY 6 HOURS
Status: DISCONTINUED | OUTPATIENT
Start: 2025-08-10 | End: 2025-08-10 | Stop reason: HOSPADM

## 2025-08-10 RX ORDER — AMOXICILLIN 250 MG
1 CAPSULE ORAL 2 TIMES DAILY PRN
Status: DISCONTINUED | OUTPATIENT
Start: 2025-08-10 | End: 2025-08-12

## 2025-08-10 RX ORDER — WARFARIN SODIUM 5 MG/1
10 TABLET ORAL
Status: COMPLETED | OUTPATIENT
Start: 2025-08-10 | End: 2025-08-10

## 2025-08-10 RX ORDER — FUROSEMIDE 10 MG/ML
40 INJECTION INTRAMUSCULAR; INTRAVENOUS ONCE
Status: COMPLETED | OUTPATIENT
Start: 2025-08-10 | End: 2025-08-10

## 2025-08-10 RX ORDER — KETOCONAZOLE 20 MG/G
CREAM TOPICAL 2 TIMES DAILY
Status: DISCONTINUED | OUTPATIENT
Start: 2025-08-10 | End: 2025-08-22 | Stop reason: HOSPADM

## 2025-08-10 RX ORDER — HYDROMORPHONE HCL IN WATER/PF 6 MG/30 ML
0.4 PATIENT CONTROLLED ANALGESIA SYRINGE INTRAVENOUS
Refills: 0 | Status: DISCONTINUED | OUTPATIENT
Start: 2025-08-10 | End: 2025-08-16

## 2025-08-10 RX ORDER — HYDROMORPHONE HYDROCHLORIDE 4 MG/1
4 TABLET ORAL EVERY 4 HOURS PRN
Status: DISCONTINUED | OUTPATIENT
Start: 2025-08-10 | End: 2025-08-16

## 2025-08-10 RX ORDER — DULOXETIN HYDROCHLORIDE 60 MG/1
120 CAPSULE, DELAYED RELEASE ORAL DAILY
Status: DISCONTINUED | OUTPATIENT
Start: 2025-08-11 | End: 2025-08-22 | Stop reason: HOSPADM

## 2025-08-10 RX ORDER — RIFAMPIN 300 MG/1
300 CAPSULE ORAL EVERY 12 HOURS
Status: DISCONTINUED | OUTPATIENT
Start: 2025-08-10 | End: 2025-08-22 | Stop reason: HOSPADM

## 2025-08-10 RX ORDER — WARFARIN SODIUM 10 MG/1
10 TABLET ORAL ONCE
Status: ON HOLD | COMMUNITY
Start: 2025-08-10 | End: 2025-08-19

## 2025-08-10 RX ORDER — NALOXONE HYDROCHLORIDE 0.4 MG/ML
0.4 INJECTION, SOLUTION INTRAMUSCULAR; INTRAVENOUS; SUBCUTANEOUS
Status: DISCONTINUED | OUTPATIENT
Start: 2025-08-10 | End: 2025-08-22 | Stop reason: HOSPADM

## 2025-08-10 RX ORDER — PANTOPRAZOLE SODIUM 40 MG/1
40 TABLET, DELAYED RELEASE ORAL
Status: ON HOLD | COMMUNITY
Start: 2025-08-11 | End: 2025-08-19

## 2025-08-10 RX ORDER — DAPTOMYCIN 50 MG/ML
6 INJECTION, POWDER, LYOPHILIZED, FOR SOLUTION INTRAVENOUS EVERY 24 HOURS
Status: DISCONTINUED | OUTPATIENT
Start: 2025-08-10 | End: 2025-08-10

## 2025-08-10 RX ORDER — NALOXONE HYDROCHLORIDE 0.4 MG/ML
0.2 INJECTION, SOLUTION INTRAMUSCULAR; INTRAVENOUS; SUBCUTANEOUS
Status: DISCONTINUED | OUTPATIENT
Start: 2025-08-10 | End: 2025-08-22 | Stop reason: HOSPADM

## 2025-08-10 RX ORDER — IPRATROPIUM BROMIDE AND ALBUTEROL SULFATE 2.5; .5 MG/3ML; MG/3ML
3 SOLUTION RESPIRATORY (INHALATION) 4 TIMES DAILY PRN
Status: DISCONTINUED | OUTPATIENT
Start: 2025-08-10 | End: 2025-08-11

## 2025-08-10 RX ORDER — MAGNESIUM OXIDE 400 MG/1
200 TABLET ORAL DAILY
Status: ON HOLD | COMMUNITY
Start: 2025-08-11 | End: 2025-08-19

## 2025-08-10 RX ORDER — OLANZAPINE 5 MG/1
15 TABLET, FILM COATED ORAL AT BEDTIME
Status: DISCONTINUED | OUTPATIENT
Start: 2025-08-10 | End: 2025-08-22 | Stop reason: HOSPADM

## 2025-08-10 RX ORDER — PIPERACILLIN SODIUM, TAZOBACTAM SODIUM 4; .5 G/20ML; G/20ML
4.5 INJECTION, POWDER, LYOPHILIZED, FOR SOLUTION INTRAVENOUS EVERY 6 HOURS
Status: DISCONTINUED | OUTPATIENT
Start: 2025-08-10 | End: 2025-08-11

## 2025-08-10 RX ORDER — MIRABEGRON 25 MG/1
50 TABLET, FILM COATED, EXTENDED RELEASE ORAL DAILY
Status: DISCONTINUED | OUTPATIENT
Start: 2025-08-11 | End: 2025-08-22 | Stop reason: HOSPADM

## 2025-08-10 RX ORDER — HYDROMORPHONE HYDROCHLORIDE 2 MG/1
2 TABLET ORAL EVERY 4 HOURS PRN
Status: DISCONTINUED | OUTPATIENT
Start: 2025-08-10 | End: 2025-08-16

## 2025-08-10 RX ORDER — LIDOCAINE 40 MG/G
CREAM TOPICAL
Status: DISCONTINUED | OUTPATIENT
Start: 2025-08-10 | End: 2025-08-22 | Stop reason: HOSPADM

## 2025-08-10 RX ORDER — OXYBUTYNIN CHLORIDE 10 MG/1
30 TABLET, EXTENDED RELEASE ORAL DAILY
Status: DISCONTINUED | OUTPATIENT
Start: 2025-08-11 | End: 2025-08-22 | Stop reason: HOSPADM

## 2025-08-10 RX ORDER — FUROSEMIDE 10 MG/ML
20 INJECTION INTRAMUSCULAR; INTRAVENOUS ONCE
Status: DISCONTINUED | OUTPATIENT
Start: 2025-08-10 | End: 2025-08-10

## 2025-08-10 RX ORDER — AMOXICILLIN 250 MG
2 CAPSULE ORAL 2 TIMES DAILY PRN
Status: DISCONTINUED | OUTPATIENT
Start: 2025-08-10 | End: 2025-08-12

## 2025-08-10 RX ORDER — KETOCONAZOLE 20 MG/ML
SHAMPOO, SUSPENSION TOPICAL DAILY PRN
Status: DISCONTINUED | OUTPATIENT
Start: 2025-08-10 | End: 2025-08-22 | Stop reason: HOSPADM

## 2025-08-10 RX ORDER — PIPERACILLIN SODIUM, TAZOBACTAM SODIUM 4; .5 G/20ML; G/20ML
4.5 INJECTION, POWDER, LYOPHILIZED, FOR SOLUTION INTRAVENOUS EVERY 6 HOURS
Status: ON HOLD | COMMUNITY
Start: 2025-08-10 | End: 2025-08-19

## 2025-08-10 RX ORDER — ALLOPURINOL 300 MG/1
300 TABLET ORAL DAILY
Status: DISCONTINUED | OUTPATIENT
Start: 2025-08-11 | End: 2025-08-22 | Stop reason: HOSPADM

## 2025-08-10 RX ORDER — HYDROMORPHONE HYDROCHLORIDE 2 MG/1
2 TABLET ORAL EVERY 4 HOURS PRN
Status: ON HOLD | COMMUNITY
Start: 2025-08-10 | End: 2025-08-19

## 2025-08-10 RX ORDER — FERROUS SULFATE 325(65) MG
325 TABLET ORAL
Status: DISCONTINUED | OUTPATIENT
Start: 2025-08-11 | End: 2025-08-22 | Stop reason: HOSPADM

## 2025-08-10 RX ORDER — ACETAMINOPHEN 325 MG/1
975 TABLET ORAL 3 TIMES DAILY
Status: DISCONTINUED | OUTPATIENT
Start: 2025-08-10 | End: 2025-08-10

## 2025-08-10 RX ADMIN — DULOXETINE HYDROCHLORIDE 120 MG: 60 CAPSULE, DELAYED RELEASE PELLETS ORAL at 08:39

## 2025-08-10 RX ADMIN — RIFAMPIN 300 MG: 300 CAPSULE ORAL at 08:39

## 2025-08-10 RX ADMIN — HYDROMORPHONE HYDROCHLORIDE 4 MG: 2 TABLET ORAL at 16:01

## 2025-08-10 RX ADMIN — MIRABEGRON 50 MG: 25 TABLET, FILM COATED, EXTENDED RELEASE ORAL at 08:39

## 2025-08-10 RX ADMIN — RIFAMPIN 300 MG: 300 CAPSULE ORAL at 23:30

## 2025-08-10 RX ADMIN — PANTOPRAZOLE SODIUM 40 MG: 40 TABLET, DELAYED RELEASE ORAL at 08:39

## 2025-08-10 RX ADMIN — PIPERACILLIN AND TAZOBACTAM 4.5 G: 4; .5 INJECTION, POWDER, LYOPHILIZED, FOR SOLUTION INTRAVENOUS at 23:29

## 2025-08-10 RX ADMIN — WARFARIN SODIUM 10 MG: 5 TABLET ORAL at 17:56

## 2025-08-10 RX ADMIN — ALLOPURINOL 300 MG: 300 TABLET ORAL at 08:39

## 2025-08-10 RX ADMIN — OXYBUTYNIN CHLORIDE 30 MG: 10 TABLET, EXTENDED RELEASE ORAL at 08:39

## 2025-08-10 RX ADMIN — PIPERACILLIN AND TAZOBACTAM 4.5 G: 4; .5 INJECTION, POWDER, LYOPHILIZED, FOR SOLUTION INTRAVENOUS at 11:18

## 2025-08-10 RX ADMIN — Medication 200 MG: at 08:39

## 2025-08-10 RX ADMIN — FUROSEMIDE 40 MG: 10 INJECTION, SOLUTION INTRAMUSCULAR; INTRAVENOUS at 11:17

## 2025-08-10 RX ADMIN — PIPERACILLIN AND TAZOBACTAM 4.5 G: 4; .5 INJECTION, POWDER, LYOPHILIZED, FOR SOLUTION INTRAVENOUS at 16:16

## 2025-08-10 RX ADMIN — FERROUS SULFATE TAB 325 MG (65 MG ELEMENTAL FE) 325 MG: 325 (65 FE) TAB at 08:39

## 2025-08-10 RX ADMIN — OLANZAPINE 15 MG: 15 TABLET, FILM COATED ORAL at 23:31

## 2025-08-10 RX ADMIN — HYDROMORPHONE HYDROCHLORIDE 4 MG: 2 TABLET ORAL at 08:39

## 2025-08-10 RX ADMIN — FUROSEMIDE 40 MG: 10 INJECTION, SOLUTION INTRAMUSCULAR; INTRAVENOUS at 16:02

## 2025-08-10 RX ADMIN — HYDROMORPHONE HYDROCHLORIDE 0.4 MG: 0.2 INJECTION, SOLUTION INTRAMUSCULAR; INTRAVENOUS; SUBCUTANEOUS at 22:03

## 2025-08-10 ASSESSMENT — ACTIVITIES OF DAILY LIVING (ADL)
ADLS_ACUITY_SCORE: 41
ADLS_ACUITY_SCORE: 63
ADLS_ACUITY_SCORE: 58
ADLS_ACUITY_SCORE: 40
ADLS_ACUITY_SCORE: 40
ADLS_ACUITY_SCORE: 41
ADLS_ACUITY_SCORE: 63
ADLS_ACUITY_SCORE: 40
ADLS_ACUITY_SCORE: 41
ADLS_ACUITY_SCORE: 40
ADLS_ACUITY_SCORE: 41
ADLS_ACUITY_SCORE: 58
ADLS_ACUITY_SCORE: 63
ADLS_ACUITY_SCORE: 40
ADLS_ACUITY_SCORE: 41

## 2025-08-11 ENCOUNTER — APPOINTMENT (OUTPATIENT)
Dept: GENERAL RADIOLOGY | Facility: CLINIC | Age: 45
DRG: 177 | End: 2025-08-11
Payer: COMMERCIAL

## 2025-08-11 ENCOUNTER — APPOINTMENT (OUTPATIENT)
Dept: CARDIOLOGY | Facility: CLINIC | Age: 45
DRG: 177 | End: 2025-08-11
Payer: COMMERCIAL

## 2025-08-11 ENCOUNTER — APPOINTMENT (OUTPATIENT)
Dept: CT IMAGING | Facility: CLINIC | Age: 45
DRG: 177 | End: 2025-08-11
Payer: COMMERCIAL

## 2025-08-11 ENCOUNTER — APPOINTMENT (OUTPATIENT)
Dept: ULTRASOUND IMAGING | Facility: CLINIC | Age: 45
DRG: 177 | End: 2025-08-11
Payer: COMMERCIAL

## 2025-08-11 PROBLEM — J96.01 ACUTE HYPOXEMIC RESPIRATORY FAILURE (H): Status: ACTIVE | Noted: 2025-08-11

## 2025-08-11 LAB
ABO + RH BLD: NORMAL
ANION GAP SERPL CALCULATED.3IONS-SCNC: 11 MMOL/L (ref 7–15)
ATRIAL RATE - MUSE: 105 BPM
BASE EXCESS BLDV CALC-SCNC: 4.2 MMOL/L (ref -3–3)
BASE EXCESS BLDV CALC-SCNC: 5.7 MMOL/L (ref -3–3)
BASOPHILS # BLD AUTO: 0 10E3/UL (ref 0–0.2)
BASOPHILS NFR BLD AUTO: 0 %
BLD GP AB SCN SERPL QL: NEGATIVE
BUN SERPL-MCNC: 14.8 MG/DL (ref 6–20)
CA-I BLD-MCNC: 4.3 MG/DL (ref 4.4–5.2)
CALCIUM SERPL-MCNC: 7.2 MG/DL (ref 8.8–10.4)
CHLORIDE SERPL-SCNC: 99 MMOL/L (ref 98–107)
CK SERPL-CCNC: 19 U/L (ref 39–308)
CREAT SERPL-MCNC: 1.46 MG/DL (ref 0.67–1.17)
CYSTATIN C (ROCHE): 1.9 MG/L (ref 0.6–1)
DIASTOLIC BLOOD PRESSURE - MUSE: NORMAL MMHG
EGFRCR SERPLBLD CKD-EPI 2021: 61 ML/MIN/1.73M2
EOSINOPHIL # BLD AUTO: 0.5 10E3/UL (ref 0–0.7)
EOSINOPHIL NFR BLD AUTO: 4 %
ERYTHROCYTE [DISTWIDTH] IN BLOOD BY AUTOMATED COUNT: 15.5 % (ref 10–15)
GFR/BSA.PRED SERPLBLD CYS-BASED-ARV: 35 ML/MIN/1.73M2
GLUCOSE BLDC GLUCOMTR-MCNC: 115 MG/DL (ref 70–99)
GLUCOSE BLDC GLUCOMTR-MCNC: 123 MG/DL (ref 70–99)
GLUCOSE SERPL-MCNC: 137 MG/DL (ref 70–99)
HCO3 BLDV-SCNC: 28 MMOL/L (ref 21–28)
HCO3 BLDV-SCNC: 30 MMOL/L (ref 21–28)
HCO3 SERPL-SCNC: 25 MMOL/L (ref 22–29)
HCT VFR BLD AUTO: 22.9 % (ref 40–53)
HGB BLD-MCNC: 7.4 G/DL (ref 13.3–17.7)
HGB BLD-MCNC: 7.4 G/DL (ref 13.3–17.7)
HOLD SPECIMEN: NORMAL
IMM GRANULOCYTES # BLD: 0 10E3/UL
IMM GRANULOCYTES NFR BLD: 0 %
INR PPP: 2.15 (ref 0.85–1.15)
INR PPP: 2.36 (ref 0.85–1.15)
INTERPRETATION ECG - MUSE: NORMAL
LVEF ECHO: NORMAL
LYMPHOCYTES # BLD AUTO: 1.3 10E3/UL (ref 0.8–5.3)
LYMPHOCYTES NFR BLD AUTO: 12 %
MAGNESIUM SERPL-MCNC: 1.6 MG/DL (ref 1.7–2.3)
MCH RBC QN AUTO: 28.4 PG (ref 26.5–33)
MCHC RBC AUTO-ENTMCNC: 32.3 G/DL (ref 31.5–36.5)
MCV RBC AUTO: 87 FL (ref 78–100)
MCV RBC AUTO: 88 FL (ref 78–100)
MCV RBC AUTO: 88 FL (ref 78–100)
MONOCYTES # BLD AUTO: 0.8 10E3/UL (ref 0–1.3)
MONOCYTES NFR BLD AUTO: 7 %
NEUTROPHILS # BLD AUTO: 8.5 10E3/UL (ref 1.6–8.3)
NEUTROPHILS NFR BLD AUTO: 76 %
NRBC # BLD AUTO: 0 10E3/UL
NRBC BLD AUTO-RTO: 0 /100
O2/TOTAL GAS SETTING VFR VENT: 50 %
O2/TOTAL GAS SETTING VFR VENT: 60 %
OXYHGB MFR BLDV: 60 % (ref 70–75)
OXYHGB MFR BLDV: 61 % (ref 70–75)
P AXIS - MUSE: 17 DEGREES
PCO2 BLDV: 40 MM HG (ref 40–50)
PCO2 BLDV: 41 MM HG (ref 40–50)
PH BLDV: 7.46 [PH] (ref 7.32–7.43)
PH BLDV: 7.47 [PH] (ref 7.32–7.43)
PLATELET # BLD AUTO: 206 10E3/UL (ref 150–450)
PO2 BLDV: 32 MM HG (ref 25–47)
PO2 BLDV: 33 MM HG (ref 25–47)
POTASSIUM SERPL-SCNC: 3.5 MMOL/L (ref 3.4–5.3)
PR INTERVAL - MUSE: 166 MS
PROTHROMBIN TIME: 24.3 SECONDS (ref 11.8–14.8)
PROTHROMBIN TIME: 26 SECONDS (ref 11.8–14.8)
QRS DURATION - MUSE: 136 MS
QT - MUSE: 380 MS
QTC - MUSE: 502 MS
R AXIS - MUSE: -44 DEGREES
RBC # BLD AUTO: 2.61 10E6/UL (ref 4.4–5.9)
SAO2 % BLDV: 61.8 % (ref 70–75)
SAO2 % BLDV: 62.8 % (ref 70–75)
SODIUM SERPL-SCNC: 135 MMOL/L (ref 135–145)
SPECIMEN EXP DATE BLD: NORMAL
SYSTOLIC BLOOD PRESSURE - MUSE: NORMAL MMHG
T AXIS - MUSE: 8 DEGREES
VENTRICULAR RATE- MUSE: 105 BPM
WBC # BLD AUTO: 10.8 10E3/UL (ref 4–11)
WBC # BLD AUTO: 11.1 10E3/UL (ref 4–11)

## 2025-08-11 PROCEDURE — 76882 US LMTD JT/FCL EVL NVASC XTR: CPT | Mod: RT

## 2025-08-11 PROCEDURE — 85025 COMPLETE CBC W/AUTO DIFF WBC: CPT

## 2025-08-11 PROCEDURE — 71045 X-RAY EXAM CHEST 1 VIEW: CPT | Mod: 26 | Performed by: RADIOLOGY

## 2025-08-11 PROCEDURE — 250N000011 HC RX IP 250 OP 636

## 2025-08-11 PROCEDURE — 250N000013 HC RX MED GY IP 250 OP 250 PS 637

## 2025-08-11 PROCEDURE — 999N000007 HC SITE CHECK

## 2025-08-11 PROCEDURE — 999N000044 HC STATISTIC CVC DRESSING CHANGE

## 2025-08-11 PROCEDURE — 99222 1ST HOSP IP/OBS MODERATE 55: CPT | Mod: 24 | Performed by: PHYSICIAN ASSISTANT

## 2025-08-11 PROCEDURE — 999N000040 HC STATISTIC CONSULT NO CHARGE VASC ACCESS

## 2025-08-11 PROCEDURE — 82550 ASSAY OF CK (CPK): CPT | Performed by: FAMILY MEDICINE

## 2025-08-11 PROCEDURE — 94640 AIRWAY INHALATION TREATMENT: CPT

## 2025-08-11 PROCEDURE — 99222 1ST HOSP IP/OBS MODERATE 55: CPT | Mod: GC | Performed by: INTERNAL MEDICINE

## 2025-08-11 PROCEDURE — 82805 BLOOD GASES W/O2 SATURATION: CPT

## 2025-08-11 PROCEDURE — 999N000127 HC STATISTIC PERIPHERAL IV START W US GUIDANCE

## 2025-08-11 PROCEDURE — 250N000009 HC RX 250

## 2025-08-11 PROCEDURE — 36415 COLL VENOUS BLD VENIPUNCTURE: CPT

## 2025-08-11 PROCEDURE — 99233 SBSQ HOSP IP/OBS HIGH 50: CPT | Mod: GC

## 2025-08-11 PROCEDURE — 94640 AIRWAY INHALATION TREATMENT: CPT | Mod: 76

## 2025-08-11 PROCEDURE — 999N000208 ECHOCARDIOGRAM COMPLETE

## 2025-08-11 PROCEDURE — 999N000157 HC STATISTIC RCP TIME EA 10 MIN

## 2025-08-11 PROCEDURE — 86923 COMPATIBILITY TEST ELECTRIC: CPT

## 2025-08-11 PROCEDURE — 82330 ASSAY OF CALCIUM: CPT

## 2025-08-11 PROCEDURE — 272N000272 HC CONTINUOUS NEBULIZER MICRO PUMP

## 2025-08-11 PROCEDURE — 250N000011 HC RX IP 250 OP 636: Performed by: FAMILY MEDICINE

## 2025-08-11 PROCEDURE — 82610 CYSTATIN C: CPT | Performed by: PHYSICIAN ASSISTANT

## 2025-08-11 PROCEDURE — 76882 US LMTD JT/FCL EVL NVASC XTR: CPT | Mod: 26 | Performed by: RADIOLOGY

## 2025-08-11 PROCEDURE — 120N000003 HC R&B IMCU UMMC

## 2025-08-11 PROCEDURE — 93306 TTE W/DOPPLER COMPLETE: CPT | Mod: 26 | Performed by: INTERNAL MEDICINE

## 2025-08-11 PROCEDURE — 80048 BASIC METABOLIC PNL TOTAL CA: CPT

## 2025-08-11 PROCEDURE — 255N000002 HC RX 255 OP 636: Performed by: INTERNAL MEDICINE

## 2025-08-11 PROCEDURE — 272N000054 HC CANNULA HIGH FLOW, ADULT

## 2025-08-11 PROCEDURE — 87040 BLOOD CULTURE FOR BACTERIA: CPT

## 2025-08-11 PROCEDURE — 71045 X-RAY EXAM CHEST 1 VIEW: CPT

## 2025-08-11 PROCEDURE — 272N000064 HC CIRCUIT HUMIDITY W/CPAP BIPAP

## 2025-08-11 PROCEDURE — 258N000003 HC RX IP 258 OP 636: Performed by: FAMILY MEDICINE

## 2025-08-11 PROCEDURE — 86900 BLOOD TYPING SEROLOGIC ABO: CPT

## 2025-08-11 PROCEDURE — 71275 CT ANGIOGRAPHY CHEST: CPT

## 2025-08-11 PROCEDURE — 85018 HEMOGLOBIN: CPT

## 2025-08-11 PROCEDURE — 83735 ASSAY OF MAGNESIUM: CPT

## 2025-08-11 PROCEDURE — 85610 PROTHROMBIN TIME: CPT

## 2025-08-11 PROCEDURE — 94660 CPAP INITIATION&MGMT: CPT

## 2025-08-11 PROCEDURE — 250N000013 HC RX MED GY IP 250 OP 250 PS 637: Performed by: FAMILY MEDICINE

## 2025-08-11 PROCEDURE — 999N000185 HC STATISTIC TRANSPORT TIME EA 15 MIN

## 2025-08-11 PROCEDURE — 71275 CT ANGIOGRAPHY CHEST: CPT | Mod: 26 | Performed by: RADIOLOGY

## 2025-08-11 PROCEDURE — G0545 PR INHRENT VISIT TO INPT/OBS W CNFRM/SUSPCT INFCT DIS BY INFCT DIS SPCIALST: HCPCS | Performed by: PHYSICIAN ASSISTANT

## 2025-08-11 PROCEDURE — 85048 AUTOMATED LEUKOCYTE COUNT: CPT | Performed by: PHYSICIAN ASSISTANT

## 2025-08-11 PROCEDURE — 999N000215 HC STATISTIC HFNC ADULT NON-CPAP

## 2025-08-11 RX ORDER — CEFAZOLIN SODIUM 1 G/50ML
2500 SOLUTION INTRAVENOUS ONCE
Status: COMPLETED | OUTPATIENT
Start: 2025-08-11 | End: 2025-08-11

## 2025-08-11 RX ORDER — SODIUM CHLORIDE FOR INHALATION 3 %
3 VIAL, NEBULIZER (ML) INHALATION
Status: DISCONTINUED | OUTPATIENT
Start: 2025-08-11 | End: 2025-08-22 | Stop reason: HOSPADM

## 2025-08-11 RX ORDER — IOPAMIDOL 755 MG/ML
500 INJECTION, SOLUTION INTRAVASCULAR ONCE
Status: DISCONTINUED | OUTPATIENT
Start: 2025-08-11 | End: 2025-08-11

## 2025-08-11 RX ORDER — ACETAMINOPHEN 500 MG
1000 TABLET ORAL EVERY 6 HOURS PRN
Status: DISCONTINUED | OUTPATIENT
Start: 2025-08-11 | End: 2025-08-22 | Stop reason: HOSPADM

## 2025-08-11 RX ORDER — FUROSEMIDE 10 MG/ML
40 INJECTION INTRAMUSCULAR; INTRAVENOUS ONCE
Status: COMPLETED | OUTPATIENT
Start: 2025-08-11 | End: 2025-08-11

## 2025-08-11 RX ORDER — IOPAMIDOL 755 MG/ML
500 INJECTION, SOLUTION INTRAVASCULAR ONCE
Status: COMPLETED | OUTPATIENT
Start: 2025-08-11 | End: 2025-08-11

## 2025-08-11 RX ORDER — WARFARIN SODIUM 10 MG/1
10 TABLET ORAL
Status: COMPLETED | OUTPATIENT
Start: 2025-08-11 | End: 2025-08-11

## 2025-08-11 RX ORDER — CARBOXYMETHYLCELLULOSE SODIUM 5 MG/ML
1 SOLUTION/ DROPS OPHTHALMIC
Status: DISCONTINUED | OUTPATIENT
Start: 2025-08-11 | End: 2025-08-22 | Stop reason: HOSPADM

## 2025-08-11 RX ORDER — CALCIUM GLUCONATE 20 MG/ML
1 INJECTION, SOLUTION INTRAVENOUS ONCE
Status: COMPLETED | OUTPATIENT
Start: 2025-08-11 | End: 2025-08-11

## 2025-08-11 RX ORDER — IPRATROPIUM BROMIDE AND ALBUTEROL SULFATE 2.5; .5 MG/3ML; MG/3ML
3 SOLUTION RESPIRATORY (INHALATION)
Status: DISCONTINUED | OUTPATIENT
Start: 2025-08-11 | End: 2025-08-22 | Stop reason: HOSPADM

## 2025-08-11 RX ORDER — CEFEPIME HYDROCHLORIDE 2 G/1
2 INJECTION, POWDER, FOR SOLUTION INTRAVENOUS EVERY 12 HOURS
Status: DISCONTINUED | OUTPATIENT
Start: 2025-08-11 | End: 2025-08-16

## 2025-08-11 RX ADMIN — Medication 200 MG: at 10:01

## 2025-08-11 RX ADMIN — HYDROMORPHONE HYDROCHLORIDE 0.4 MG: 0.2 INJECTION, SOLUTION INTRAMUSCULAR; INTRAVENOUS; SUBCUTANEOUS at 19:03

## 2025-08-11 RX ADMIN — IPRATROPIUM BROMIDE AND ALBUTEROL SULFATE 3 ML: .5; 3 SOLUTION RESPIRATORY (INHALATION) at 09:39

## 2025-08-11 RX ADMIN — IPRATROPIUM BROMIDE AND ALBUTEROL SULFATE 3 ML: .5; 3 SOLUTION RESPIRATORY (INHALATION) at 20:31

## 2025-08-11 RX ADMIN — VANCOMYCIN HYDROCHLORIDE 2500 MG: 500 INJECTION, POWDER, LYOPHILIZED, FOR SOLUTION INTRAVENOUS at 18:43

## 2025-08-11 RX ADMIN — OXYBUTYNIN CHLORIDE 30 MG: 10 TABLET, EXTENDED RELEASE ORAL at 09:33

## 2025-08-11 RX ADMIN — CEFEPIME 2 G: 2 INJECTION, POWDER, FOR SOLUTION INTRAVENOUS at 17:40

## 2025-08-11 RX ADMIN — PIPERACILLIN AND TAZOBACTAM 4.5 G: 4; .5 INJECTION, POWDER, LYOPHILIZED, FOR SOLUTION INTRAVENOUS at 06:25

## 2025-08-11 RX ADMIN — PIPERACILLIN AND TAZOBACTAM 4.5 G: 4; .5 INJECTION, POWDER, LYOPHILIZED, FOR SOLUTION INTRAVENOUS at 13:38

## 2025-08-11 RX ADMIN — RIFAMPIN 300 MG: 300 CAPSULE ORAL at 22:15

## 2025-08-11 RX ADMIN — MIRABEGRON 50 MG: 25 TABLET, FILM COATED, EXTENDED RELEASE ORAL at 09:33

## 2025-08-11 RX ADMIN — IOPAMIDOL 77 ML: 755 INJECTION, SOLUTION INTRAVENOUS at 16:32

## 2025-08-11 RX ADMIN — WARFARIN SODIUM 10 MG: 10 TABLET ORAL at 17:43

## 2025-08-11 RX ADMIN — OLANZAPINE 15 MG: 15 TABLET, FILM COATED ORAL at 22:13

## 2025-08-11 RX ADMIN — ALLOPURINOL 300 MG: 300 TABLET ORAL at 09:33

## 2025-08-11 RX ADMIN — PANTOPRAZOLE SODIUM 40 MG: 40 TABLET, DELAYED RELEASE ORAL at 09:33

## 2025-08-11 RX ADMIN — FERROUS SULFATE TAB 325 MG (65 MG ELEMENTAL FE) 325 MG: 325 (65 FE) TAB at 09:33

## 2025-08-11 RX ADMIN — ACETAMINOPHEN 1000 MG: 500 TABLET ORAL at 17:43

## 2025-08-11 RX ADMIN — SODIUM CHLORIDE 90 ML: 9 INJECTION, SOLUTION INTRAVENOUS at 16:34

## 2025-08-11 RX ADMIN — FUROSEMIDE 40 MG: 10 INJECTION, SOLUTION INTRAMUSCULAR; INTRAVENOUS at 10:00

## 2025-08-11 RX ADMIN — DAPTOMYCIN 1000 MG: 500 INJECTION, POWDER, LYOPHILIZED, FOR SOLUTION INTRAVENOUS at 00:17

## 2025-08-11 RX ADMIN — DULOXETINE 120 MG: 30 CAPSULE, DELAYED RELEASE ORAL at 09:33

## 2025-08-11 RX ADMIN — CALCIUM GLUCONATE 1 G: 20 INJECTION, SOLUTION INTRAVENOUS at 15:42

## 2025-08-11 RX ADMIN — IPRATROPIUM BROMIDE AND ALBUTEROL SULFATE 3 ML: .5; 3 SOLUTION RESPIRATORY (INHALATION) at 12:50

## 2025-08-11 RX ADMIN — IPRATROPIUM BROMIDE AND ALBUTEROL SULFATE 3 ML: .5; 3 SOLUTION RESPIRATORY (INHALATION) at 15:59

## 2025-08-11 RX ADMIN — HUMAN ALBUMIN MICROSPHERES AND PERFLUTREN 5 ML (DILUTED): 10; .22 INJECTION, SOLUTION INTRAVENOUS at 14:17

## 2025-08-11 ASSESSMENT — ACTIVITIES OF DAILY LIVING (ADL)
ADLS_ACUITY_SCORE: 63
ADLS_ACUITY_SCORE: 62
ADLS_ACUITY_SCORE: 46
ADLS_ACUITY_SCORE: 63
ADLS_ACUITY_SCORE: 62
ADLS_ACUITY_SCORE: 63
ADLS_ACUITY_SCORE: 63
ADLS_ACUITY_SCORE: 62
ADLS_ACUITY_SCORE: 63
ADLS_ACUITY_SCORE: 46
ADLS_ACUITY_SCORE: 63
ADLS_ACUITY_SCORE: 69
ADLS_ACUITY_SCORE: 63
ADLS_ACUITY_SCORE: 63
ADLS_ACUITY_SCORE: 46

## 2025-08-12 ENCOUNTER — APPOINTMENT (OUTPATIENT)
Dept: PHYSICAL THERAPY | Facility: CLINIC | Age: 45
DRG: 177 | End: 2025-08-12
Payer: COMMERCIAL

## 2025-08-12 LAB
ANION GAP SERPL CALCULATED.3IONS-SCNC: 11 MMOL/L (ref 7–15)
BUN SERPL-MCNC: 16.4 MG/DL (ref 6–20)
CALCIUM SERPL-MCNC: 8.5 MG/DL (ref 8.8–10.4)
CHLORIDE SERPL-SCNC: 102 MMOL/L (ref 98–107)
CREAT SERPL-MCNC: 1.26 MG/DL (ref 0.67–1.17)
EGFRCR SERPLBLD CKD-EPI 2021: 73 ML/MIN/1.73M2
ERYTHROCYTE [DISTWIDTH] IN BLOOD BY AUTOMATED COUNT: 15.3 % (ref 10–15)
GLUCOSE SERPL-MCNC: 129 MG/DL (ref 70–99)
HCO3 SERPL-SCNC: 25 MMOL/L (ref 22–29)
HCT VFR BLD AUTO: 22.7 % (ref 40–53)
HGB BLD-MCNC: 7.3 G/DL (ref 13.3–17.7)
HGB BLD-MCNC: 8 G/DL (ref 13.3–17.7)
INR PPP: 2.75 (ref 0.85–1.15)
MAGNESIUM SERPL-MCNC: 1.8 MG/DL (ref 1.7–2.3)
MCH RBC QN AUTO: 28.2 PG (ref 26.5–33)
MCHC RBC AUTO-ENTMCNC: 32.2 G/DL (ref 31.5–36.5)
MCV RBC AUTO: 84.9 FL (ref 78–100)
MCV RBC AUTO: 88 FL (ref 78–100)
PLATELET # BLD AUTO: 209 10E3/UL (ref 150–450)
POTASSIUM SERPL-SCNC: 3.7 MMOL/L (ref 3.4–5.3)
PROTHROMBIN TIME: 29.4 SECONDS (ref 11.8–14.8)
RBC # BLD AUTO: 2.59 10E6/UL (ref 4.4–5.9)
SODIUM SERPL-SCNC: 138 MMOL/L (ref 135–145)
WBC # BLD AUTO: 11 10E3/UL (ref 4–11)

## 2025-08-12 PROCEDURE — 94640 AIRWAY INHALATION TREATMENT: CPT

## 2025-08-12 PROCEDURE — 85018 HEMOGLOBIN: CPT

## 2025-08-12 PROCEDURE — 99232 SBSQ HOSP IP/OBS MODERATE 35: CPT | Mod: GC

## 2025-08-12 PROCEDURE — 999N000157 HC STATISTIC RCP TIME EA 10 MIN

## 2025-08-12 PROCEDURE — 250N000011 HC RX IP 250 OP 636

## 2025-08-12 PROCEDURE — 85014 HEMATOCRIT: CPT

## 2025-08-12 PROCEDURE — 120N000003 HC R&B IMCU UMMC

## 2025-08-12 PROCEDURE — 250N000013 HC RX MED GY IP 250 OP 250 PS 637

## 2025-08-12 PROCEDURE — 83735 ASSAY OF MAGNESIUM: CPT

## 2025-08-12 PROCEDURE — 85610 PROTHROMBIN TIME: CPT

## 2025-08-12 PROCEDURE — 250N000013 HC RX MED GY IP 250 OP 250 PS 637: Performed by: FAMILY MEDICINE

## 2025-08-12 PROCEDURE — 999N000007 HC SITE CHECK

## 2025-08-12 PROCEDURE — 80048 BASIC METABOLIC PNL TOTAL CA: CPT

## 2025-08-12 PROCEDURE — 250N000011 HC RX IP 250 OP 636: Performed by: FAMILY MEDICINE

## 2025-08-12 PROCEDURE — 97161 PT EVAL LOW COMPLEX 20 MIN: CPT | Mod: GP

## 2025-08-12 PROCEDURE — 250N000009 HC RX 250

## 2025-08-12 PROCEDURE — 94660 CPAP INITIATION&MGMT: CPT

## 2025-08-12 PROCEDURE — 94640 AIRWAY INHALATION TREATMENT: CPT | Mod: 76

## 2025-08-12 PROCEDURE — 97530 THERAPEUTIC ACTIVITIES: CPT | Mod: GP

## 2025-08-12 RX ORDER — PANTOPRAZOLE SODIUM 40 MG/1
40 TABLET, DELAYED RELEASE ORAL
Status: DISCONTINUED | OUTPATIENT
Start: 2025-08-12 | End: 2025-08-22 | Stop reason: HOSPADM

## 2025-08-12 RX ORDER — FUROSEMIDE 10 MG/ML
40 INJECTION INTRAMUSCULAR; INTRAVENOUS ONCE
Status: COMPLETED | OUTPATIENT
Start: 2025-08-12 | End: 2025-08-12

## 2025-08-12 RX ORDER — WARFARIN SODIUM 7.5 MG/1
7.5 TABLET ORAL
Status: COMPLETED | OUTPATIENT
Start: 2025-08-12 | End: 2025-08-12

## 2025-08-12 RX ORDER — POLYETHYLENE GLYCOL 3350 17 G/17G
17 POWDER, FOR SOLUTION ORAL DAILY
Status: DISCONTINUED | OUTPATIENT
Start: 2025-08-12 | End: 2025-08-22 | Stop reason: HOSPADM

## 2025-08-12 RX ORDER — AMOXICILLIN 250 MG
2 CAPSULE ORAL 2 TIMES DAILY
Status: DISCONTINUED | OUTPATIENT
Start: 2025-08-12 | End: 2025-08-22 | Stop reason: HOSPADM

## 2025-08-12 RX ORDER — AMOXICILLIN 250 MG
1 CAPSULE ORAL 2 TIMES DAILY
Status: DISCONTINUED | OUTPATIENT
Start: 2025-08-12 | End: 2025-08-22 | Stop reason: HOSPADM

## 2025-08-12 RX ADMIN — CEFEPIME 2 G: 2 INJECTION, POWDER, FOR SOLUTION INTRAVENOUS at 16:00

## 2025-08-12 RX ADMIN — HYDROMORPHONE HYDROCHLORIDE 2 MG: 2 TABLET ORAL at 10:19

## 2025-08-12 RX ADMIN — WARFARIN SODIUM 7.5 MG: 7.5 TABLET ORAL at 18:08

## 2025-08-12 RX ADMIN — FERROUS SULFATE TAB 325 MG (65 MG ELEMENTAL FE) 325 MG: 325 (65 FE) TAB at 07:46

## 2025-08-12 RX ADMIN — FUROSEMIDE 40 MG: 10 INJECTION, SOLUTION INTRAMUSCULAR; INTRAVENOUS at 15:56

## 2025-08-12 RX ADMIN — IPRATROPIUM BROMIDE AND ALBUTEROL SULFATE 3 ML: .5; 3 SOLUTION RESPIRATORY (INHALATION) at 19:46

## 2025-08-12 RX ADMIN — PANTOPRAZOLE SODIUM 40 MG: 40 TABLET, DELAYED RELEASE ORAL at 07:46

## 2025-08-12 RX ADMIN — HYDROMORPHONE HYDROCHLORIDE 4 MG: 2 TABLET ORAL at 22:22

## 2025-08-12 RX ADMIN — HYDROMORPHONE HYDROCHLORIDE 4 MG: 2 TABLET ORAL at 16:16

## 2025-08-12 RX ADMIN — RIFAMPIN 300 MG: 300 CAPSULE ORAL at 22:23

## 2025-08-12 RX ADMIN — IPRATROPIUM BROMIDE AND ALBUTEROL SULFATE 3 ML: .5; 3 SOLUTION RESPIRATORY (INHALATION) at 12:32

## 2025-08-12 RX ADMIN — OLANZAPINE 15 MG: 15 TABLET, FILM COATED ORAL at 22:23

## 2025-08-12 RX ADMIN — POLYETHYLENE GLYCOL 3350 17 G: 17 POWDER, FOR SOLUTION ORAL at 12:14

## 2025-08-12 RX ADMIN — HYDROMORPHONE HYDROCHLORIDE 0.4 MG: 0.2 INJECTION, SOLUTION INTRAMUSCULAR; INTRAVENOUS; SUBCUTANEOUS at 20:21

## 2025-08-12 RX ADMIN — IPRATROPIUM BROMIDE AND ALBUTEROL SULFATE 3 ML: .5; 3 SOLUTION RESPIRATORY (INHALATION) at 16:11

## 2025-08-12 RX ADMIN — MIRABEGRON 50 MG: 25 TABLET, FILM COATED, EXTENDED RELEASE ORAL at 07:47

## 2025-08-12 RX ADMIN — CEFEPIME 2 G: 2 INJECTION, POWDER, FOR SOLUTION INTRAVENOUS at 05:12

## 2025-08-12 RX ADMIN — Medication 200 MG: at 07:47

## 2025-08-12 RX ADMIN — DULOXETINE 120 MG: 30 CAPSULE, DELAYED RELEASE ORAL at 07:46

## 2025-08-12 RX ADMIN — SENNOSIDES, DOCUSATE SODIUM 2 TABLET: 50; 8.6 TABLET, FILM COATED ORAL at 10:19

## 2025-08-12 RX ADMIN — Medication 2000 MG: at 19:25

## 2025-08-12 RX ADMIN — ALLOPURINOL 300 MG: 300 TABLET ORAL at 07:46

## 2025-08-12 RX ADMIN — PANTOPRAZOLE SODIUM 40 MG: 40 TABLET, DELAYED RELEASE ORAL at 15:56

## 2025-08-12 RX ADMIN — OXYBUTYNIN CHLORIDE 30 MG: 10 TABLET, EXTENDED RELEASE ORAL at 07:48

## 2025-08-12 RX ADMIN — IPRATROPIUM BROMIDE AND ALBUTEROL SULFATE 3 ML: .5; 3 SOLUTION RESPIRATORY (INHALATION) at 07:46

## 2025-08-12 RX ADMIN — FUROSEMIDE 40 MG: 10 INJECTION, SOLUTION INTRAMUSCULAR; INTRAVENOUS at 07:46

## 2025-08-12 RX ADMIN — RIFAMPIN 300 MG: 300 CAPSULE ORAL at 10:19

## 2025-08-12 ASSESSMENT — ACTIVITIES OF DAILY LIVING (ADL)
ADLS_ACUITY_SCORE: 44
ADLS_ACUITY_SCORE: 46
DEPENDENT_IADLS:: COOKING;CLEANING;LAUNDRY;SHOPPING;MEAL PREPARATION;MEDICATION MANAGEMENT;MONEY MANAGEMENT;TRANSPORTATION
ADLS_ACUITY_SCORE: 44

## 2025-08-13 ENCOUNTER — APPOINTMENT (OUTPATIENT)
Dept: CT IMAGING | Facility: CLINIC | Age: 45
DRG: 177 | End: 2025-08-13
Payer: COMMERCIAL

## 2025-08-13 LAB
ABO + RH BLD: NORMAL
ANION GAP SERPL CALCULATED.3IONS-SCNC: 13 MMOL/L (ref 7–15)
BASOPHILS # BLD AUTO: 0.03 10E3/UL (ref 0–0.2)
BASOPHILS NFR BLD AUTO: 0.3 %
BLD GP AB SCN SERPL QL: NEGATIVE
BLD PROD TYP BPU: NORMAL
BLOOD COMPONENT TYPE: NORMAL
BUN SERPL-MCNC: 17.1 MG/DL (ref 6–20)
CA-I BLD-MCNC: 4.5 MG/DL (ref 4.4–5.2)
CALCIUM SERPL-MCNC: 8.1 MG/DL (ref 8.8–10.4)
CHLORIDE SERPL-SCNC: 98 MMOL/L (ref 98–107)
CODING SYSTEM: NORMAL
CREAT SERPL-MCNC: 1.18 MG/DL (ref 0.67–1.17)
CROSSMATCH: NORMAL
CRP SERPL-MCNC: 287.78 MG/L
EGFRCR SERPLBLD CKD-EPI 2021: 79 ML/MIN/1.73M2
EOSINOPHIL # BLD AUTO: 0.92 10E3/UL (ref 0–0.7)
EOSINOPHIL NFR BLD AUTO: 8.9 %
ERYTHROCYTE [DISTWIDTH] IN BLOOD BY AUTOMATED COUNT: 15 % (ref 10–15)
ERYTHROCYTE [SEDIMENTATION RATE] IN BLOOD BY WESTERGREN METHOD: >140 MM/HR (ref 0–15)
GLUCOSE SERPL-MCNC: 124 MG/DL (ref 70–99)
HCO3 SERPL-SCNC: 24 MMOL/L (ref 22–29)
HCT VFR BLD AUTO: 21.1 % (ref 40–53)
HGB BLD-MCNC: 6.9 G/DL (ref 13.3–17.7)
HGB BLD-MCNC: 8.1 G/DL (ref 13.3–17.7)
IMM GRANULOCYTES # BLD: 0.03 10E3/UL
IMM GRANULOCYTES NFR BLD: 0.3 %
INR PPP: 3.92 (ref 0.85–1.15)
ISSUE DATE AND TIME: NORMAL
LYMPHOCYTES # BLD AUTO: 1.25 10E3/UL (ref 0.8–5.3)
LYMPHOCYTES NFR BLD AUTO: 12.1 %
MAGNESIUM SERPL-MCNC: 1.6 MG/DL (ref 1.7–2.3)
MCH RBC QN AUTO: 27.9 PG (ref 26.5–33)
MCHC RBC AUTO-ENTMCNC: 32.7 G/DL (ref 31.5–36.5)
MCV RBC AUTO: 84.2 FL (ref 78–100)
MCV RBC AUTO: 85 FL (ref 78–100)
MCV RBC AUTO: 85.4 FL (ref 78–100)
MONOCYTES # BLD AUTO: 0.67 10E3/UL (ref 0–1.3)
MONOCYTES NFR BLD AUTO: 6.5 %
NEUTROPHILS # BLD AUTO: 7.41 10E3/UL (ref 1.6–8.3)
NEUTROPHILS NFR BLD AUTO: 71.9 %
NRBC # BLD AUTO: <0.03 10E3/UL
NRBC BLD AUTO-RTO: 0 /100
PLATELET # BLD AUTO: 179 10E3/UL (ref 150–450)
POTASSIUM SERPL-SCNC: 3.1 MMOL/L (ref 3.4–5.3)
POTASSIUM SERPL-SCNC: 3.5 MMOL/L (ref 3.4–5.3)
PROTHROMBIN TIME: 38.3 SECONDS (ref 11.8–14.8)
RBC # BLD AUTO: 2.47 10E6/UL (ref 4.4–5.9)
SODIUM SERPL-SCNC: 135 MMOL/L (ref 135–145)
SPECIMEN EXP DATE BLD: NORMAL
UNIT ABO/RH: NORMAL
UNIT NUMBER: NORMAL
UNIT STATUS: NORMAL
UNIT TYPE ISBT: 5100
VANCOMYCIN SERPL-MCNC: 18.3 UG/ML (ref ?–25)
WBC # BLD AUTO: 10.31 10E3/UL (ref 4–11)
WBC # BLD AUTO: 9.32 10E3/UL (ref 4–11)

## 2025-08-13 PROCEDURE — 80202 ASSAY OF VANCOMYCIN: CPT | Performed by: FAMILY MEDICINE

## 2025-08-13 PROCEDURE — P9016 RBC LEUKOCYTES REDUCED: HCPCS

## 2025-08-13 PROCEDURE — 85018 HEMOGLOBIN: CPT

## 2025-08-13 PROCEDURE — 250N000009 HC RX 250

## 2025-08-13 PROCEDURE — 83735 ASSAY OF MAGNESIUM: CPT

## 2025-08-13 PROCEDURE — 250N000013 HC RX MED GY IP 250 OP 250 PS 637

## 2025-08-13 PROCEDURE — 250N000011 HC RX IP 250 OP 636

## 2025-08-13 PROCEDURE — 999N000157 HC STATISTIC RCP TIME EA 10 MIN

## 2025-08-13 PROCEDURE — 250N000011 HC RX IP 250 OP 636: Performed by: FAMILY MEDICINE

## 2025-08-13 PROCEDURE — 80048 BASIC METABOLIC PNL TOTAL CA: CPT

## 2025-08-13 PROCEDURE — 86140 C-REACTIVE PROTEIN: CPT

## 2025-08-13 PROCEDURE — 84132 ASSAY OF SERUM POTASSIUM: CPT

## 2025-08-13 PROCEDURE — 999N000185 HC STATISTIC TRANSPORT TIME EA 15 MIN

## 2025-08-13 PROCEDURE — 94640 AIRWAY INHALATION TREATMENT: CPT | Mod: 76

## 2025-08-13 PROCEDURE — 75635 CT ANGIO ABDOMINAL ARTERIES: CPT

## 2025-08-13 PROCEDURE — G0545 PR INHRENT VISIT TO INPT/OBS W CNFRM/SUSPCT INFCT DIS BY INFCT DIS SPCIALST: HCPCS | Performed by: INTERNAL MEDICINE

## 2025-08-13 PROCEDURE — 85610 PROTHROMBIN TIME: CPT

## 2025-08-13 PROCEDURE — 75635 CT ANGIO ABDOMINAL ARTERIES: CPT | Mod: 26 | Performed by: STUDENT IN AN ORGANIZED HEALTH CARE EDUCATION/TRAINING PROGRAM

## 2025-08-13 PROCEDURE — 85048 AUTOMATED LEUKOCYTE COUNT: CPT | Performed by: INTERNAL MEDICINE

## 2025-08-13 PROCEDURE — 94660 CPAP INITIATION&MGMT: CPT

## 2025-08-13 PROCEDURE — 99232 SBSQ HOSP IP/OBS MODERATE 35: CPT | Mod: GC | Performed by: INTERNAL MEDICINE

## 2025-08-13 PROCEDURE — 85652 RBC SED RATE AUTOMATED: CPT

## 2025-08-13 PROCEDURE — 99232 SBSQ HOSP IP/OBS MODERATE 35: CPT | Mod: GC

## 2025-08-13 PROCEDURE — 120N000003 HC R&B IMCU UMMC

## 2025-08-13 PROCEDURE — 82330 ASSAY OF CALCIUM: CPT

## 2025-08-13 PROCEDURE — 85025 COMPLETE CBC W/AUTO DIFF WBC: CPT

## 2025-08-13 PROCEDURE — 99233 SBSQ HOSP IP/OBS HIGH 50: CPT | Performed by: INTERNAL MEDICINE

## 2025-08-13 RX ORDER — FUROSEMIDE 10 MG/ML
40 INJECTION INTRAMUSCULAR; INTRAVENOUS EVERY 6 HOURS
Status: COMPLETED | OUTPATIENT
Start: 2025-08-13 | End: 2025-08-13

## 2025-08-13 RX ORDER — FUROSEMIDE 10 MG/ML
20 INJECTION INTRAMUSCULAR; INTRAVENOUS ONCE
Status: COMPLETED | OUTPATIENT
Start: 2025-08-13 | End: 2025-08-13

## 2025-08-13 RX ORDER — SODIUM CHLORIDE FOR INHALATION 10 %
15 VIAL, NEBULIZER (ML) INHALATION
Status: DISCONTINUED | OUTPATIENT
Start: 2025-08-13 | End: 2025-08-22 | Stop reason: HOSPADM

## 2025-08-13 RX ORDER — IOPAMIDOL 755 MG/ML
500 INJECTION, SOLUTION INTRAVASCULAR ONCE
Status: COMPLETED | OUTPATIENT
Start: 2025-08-13 | End: 2025-08-13

## 2025-08-13 RX ORDER — POTASSIUM CHLORIDE 1500 MG/1
40 TABLET, EXTENDED RELEASE ORAL ONCE
Status: COMPLETED | OUTPATIENT
Start: 2025-08-13 | End: 2025-08-13

## 2025-08-13 RX ADMIN — ACETAMINOPHEN 1000 MG: 500 TABLET ORAL at 09:34

## 2025-08-13 RX ADMIN — FUROSEMIDE 40 MG: 10 INJECTION, SOLUTION INTRAMUSCULAR; INTRAVENOUS at 15:42

## 2025-08-13 RX ADMIN — RIFAMPIN 300 MG: 300 CAPSULE ORAL at 11:47

## 2025-08-13 RX ADMIN — POTASSIUM CHLORIDE 40 MEQ: 1500 TABLET, EXTENDED RELEASE ORAL at 08:44

## 2025-08-13 RX ADMIN — SODIUM CHLORIDE 90 ML: 9 INJECTION, SOLUTION INTRAVENOUS at 06:14

## 2025-08-13 RX ADMIN — FUROSEMIDE 20 MG: 10 INJECTION, SOLUTION INTRAMUSCULAR; INTRAVENOUS at 06:14

## 2025-08-13 RX ADMIN — DICLOFENAC SODIUM TOPICAL GEL, 1% 2 G: 10 GEL TOPICAL at 09:51

## 2025-08-13 RX ADMIN — Medication 1250 MG: at 22:22

## 2025-08-13 RX ADMIN — PANTOPRAZOLE SODIUM 40 MG: 40 TABLET, DELAYED RELEASE ORAL at 08:44

## 2025-08-13 RX ADMIN — MIRABEGRON 50 MG: 25 TABLET, FILM COATED, EXTENDED RELEASE ORAL at 09:34

## 2025-08-13 RX ADMIN — ALLOPURINOL 300 MG: 300 TABLET ORAL at 08:44

## 2025-08-13 RX ADMIN — CEFEPIME 2 G: 2 INJECTION, POWDER, FOR SOLUTION INTRAVENOUS at 04:15

## 2025-08-13 RX ADMIN — Medication 200 MG: at 08:44

## 2025-08-13 RX ADMIN — HYDROMORPHONE HYDROCHLORIDE 4 MG: 2 TABLET ORAL at 09:34

## 2025-08-13 RX ADMIN — OXYBUTYNIN CHLORIDE 30 MG: 10 TABLET, EXTENDED RELEASE ORAL at 08:44

## 2025-08-13 RX ADMIN — FUROSEMIDE 40 MG: 10 INJECTION, SOLUTION INTRAMUSCULAR; INTRAVENOUS at 09:34

## 2025-08-13 RX ADMIN — PANTOPRAZOLE SODIUM 40 MG: 40 TABLET, DELAYED RELEASE ORAL at 15:42

## 2025-08-13 RX ADMIN — SENNOSIDES AND DOCUSATE SODIUM 1 TABLET: 50; 8.6 TABLET ORAL at 08:44

## 2025-08-13 RX ADMIN — Medication 1250 MG: at 09:54

## 2025-08-13 RX ADMIN — IPRATROPIUM BROMIDE AND ALBUTEROL SULFATE 3 ML: .5; 3 SOLUTION RESPIRATORY (INHALATION) at 20:12

## 2025-08-13 RX ADMIN — IPRATROPIUM BROMIDE AND ALBUTEROL SULFATE 3 ML: .5; 3 SOLUTION RESPIRATORY (INHALATION) at 08:15

## 2025-08-13 RX ADMIN — IOPAMIDOL 149 ML: 755 INJECTION, SOLUTION INTRAVENOUS at 06:15

## 2025-08-13 RX ADMIN — IPRATROPIUM BROMIDE AND ALBUTEROL SULFATE 3 ML: .5; 3 SOLUTION RESPIRATORY (INHALATION) at 12:48

## 2025-08-13 RX ADMIN — CEFEPIME 2 G: 2 INJECTION, POWDER, FOR SOLUTION INTRAVENOUS at 16:51

## 2025-08-13 RX ADMIN — DULOXETINE 120 MG: 30 CAPSULE, DELAYED RELEASE ORAL at 08:44

## 2025-08-13 RX ADMIN — POLYETHYLENE GLYCOL 3350 17 G: 17 POWDER, FOR SOLUTION ORAL at 08:44

## 2025-08-13 RX ADMIN — IPRATROPIUM BROMIDE AND ALBUTEROL SULFATE 3 ML: .5; 3 SOLUTION RESPIRATORY (INHALATION) at 16:03

## 2025-08-13 RX ADMIN — SENNOSIDES AND DOCUSATE SODIUM 1 TABLET: 50; 8.6 TABLET ORAL at 19:26

## 2025-08-13 RX ADMIN — RIFAMPIN 300 MG: 300 CAPSULE ORAL at 22:22

## 2025-08-13 RX ADMIN — OLANZAPINE 15 MG: 15 TABLET, FILM COATED ORAL at 19:26

## 2025-08-13 ASSESSMENT — ACTIVITIES OF DAILY LIVING (ADL)
ADLS_ACUITY_SCORE: 44
ADLS_ACUITY_SCORE: 46
ADLS_ACUITY_SCORE: 44
ADLS_ACUITY_SCORE: 46
ADLS_ACUITY_SCORE: 46
ADLS_ACUITY_SCORE: 44
ADLS_ACUITY_SCORE: 46

## 2025-08-14 VITALS
TEMPERATURE: 98.2 F | DIASTOLIC BLOOD PRESSURE: 59 MMHG | WEIGHT: 315 LBS | OXYGEN SATURATION: 100 % | SYSTOLIC BLOOD PRESSURE: 112 MMHG | HEIGHT: 72 IN | RESPIRATION RATE: 19 BRPM | HEART RATE: 83 BPM | BODY MASS INDEX: 42.66 KG/M2

## 2025-08-14 LAB
BACTERIA SPEC CULT: NORMAL
CREAT SERPL-MCNC: 1.17 MG/DL (ref 0.67–1.17)
EGFRCR SERPLBLD CKD-EPI 2021: 79 ML/MIN/1.73M2
ERYTHROCYTE [DISTWIDTH] IN BLOOD BY AUTOMATED COUNT: 14.6 % (ref 10–15)
GLUCOSE BLDC GLUCOMTR-MCNC: 128 MG/DL (ref 70–99)
HCT VFR BLD AUTO: 28.7 % (ref 40–53)
HGB BLD-MCNC: 9.5 G/DL (ref 13.3–17.7)
INR PPP: 3.07 (ref 0.85–1.15)
MAGNESIUM SERPL-MCNC: 1.9 MG/DL (ref 1.7–2.3)
MCH RBC QN AUTO: 28.7 PG (ref 26.5–33)
MCHC RBC AUTO-ENTMCNC: 33.1 G/DL (ref 31.5–36.5)
MCV RBC AUTO: 86.7 FL (ref 78–100)
PLATELET # BLD AUTO: 210 10E3/UL (ref 150–450)
POTASSIUM SERPL-SCNC: 3.2 MMOL/L (ref 3.4–5.3)
POTASSIUM SERPL-SCNC: 3.2 MMOL/L (ref 3.4–5.3)
POTASSIUM SERPL-SCNC: 3.8 MMOL/L (ref 3.4–5.3)
PROCALCITONIN SERPL IA-MCNC: 0.33 NG/ML
PROTHROMBIN TIME: 31.8 SECONDS (ref 11.8–14.8)
RBC # BLD AUTO: 3.31 10E6/UL (ref 4.4–5.9)
WBC # BLD AUTO: 8.73 10E3/UL (ref 4–11)

## 2025-08-14 PROCEDURE — 250N000011 HC RX IP 250 OP 636

## 2025-08-14 PROCEDURE — 84132 ASSAY OF SERUM POTASSIUM: CPT

## 2025-08-14 PROCEDURE — 94640 AIRWAY INHALATION TREATMENT: CPT | Mod: 76

## 2025-08-14 PROCEDURE — 999N000157 HC STATISTIC RCP TIME EA 10 MIN

## 2025-08-14 PROCEDURE — 120N000003 HC R&B IMCU UMMC

## 2025-08-14 PROCEDURE — 250N000013 HC RX MED GY IP 250 OP 250 PS 637

## 2025-08-14 PROCEDURE — 94660 CPAP INITIATION&MGMT: CPT

## 2025-08-14 PROCEDURE — 94640 AIRWAY INHALATION TREATMENT: CPT

## 2025-08-14 PROCEDURE — 250N000011 HC RX IP 250 OP 636: Performed by: FAMILY MEDICINE

## 2025-08-14 PROCEDURE — 85610 PROTHROMBIN TIME: CPT

## 2025-08-14 PROCEDURE — 86901 BLOOD TYPING SEROLOGIC RH(D): CPT

## 2025-08-14 PROCEDURE — 250N000009 HC RX 250

## 2025-08-14 PROCEDURE — 82565 ASSAY OF CREATININE: CPT | Performed by: FAMILY MEDICINE

## 2025-08-14 PROCEDURE — 83735 ASSAY OF MAGNESIUM: CPT

## 2025-08-14 PROCEDURE — 99232 SBSQ HOSP IP/OBS MODERATE 35: CPT | Mod: GC | Performed by: INTERNAL MEDICINE

## 2025-08-14 PROCEDURE — 99233 SBSQ HOSP IP/OBS HIGH 50: CPT | Performed by: INTERNAL MEDICINE

## 2025-08-14 PROCEDURE — 84145 PROCALCITONIN (PCT): CPT | Performed by: STUDENT IN AN ORGANIZED HEALTH CARE EDUCATION/TRAINING PROGRAM

## 2025-08-14 PROCEDURE — 84132 ASSAY OF SERUM POTASSIUM: CPT | Performed by: FAMILY MEDICINE

## 2025-08-14 PROCEDURE — 99232 SBSQ HOSP IP/OBS MODERATE 35: CPT | Mod: GC

## 2025-08-14 PROCEDURE — 250N000013 HC RX MED GY IP 250 OP 250 PS 637: Performed by: FAMILY MEDICINE

## 2025-08-14 PROCEDURE — 85027 COMPLETE CBC AUTOMATED: CPT

## 2025-08-14 PROCEDURE — G0545 PR INHRENT VISIT TO INPT/OBS W CNFRM/SUSPCT INFCT DIS BY INFCT DIS SPCIALST: HCPCS | Performed by: INTERNAL MEDICINE

## 2025-08-14 RX ORDER — FUROSEMIDE 10 MG/ML
60 INJECTION INTRAMUSCULAR; INTRAVENOUS EVERY 6 HOURS
Status: COMPLETED | OUTPATIENT
Start: 2025-08-14 | End: 2025-08-14

## 2025-08-14 RX ORDER — KETOROLAC TROMETHAMINE 15 MG/ML
30 INJECTION, SOLUTION INTRAMUSCULAR; INTRAVENOUS ONCE
Status: COMPLETED | OUTPATIENT
Start: 2025-08-14 | End: 2025-08-14

## 2025-08-14 RX ORDER — POTASSIUM CHLORIDE 1500 MG/1
40 TABLET, EXTENDED RELEASE ORAL ONCE
Status: COMPLETED | OUTPATIENT
Start: 2025-08-14 | End: 2025-08-14

## 2025-08-14 RX ORDER — WARFARIN SODIUM 7.5 MG/1
7.5 TABLET ORAL
Status: COMPLETED | OUTPATIENT
Start: 2025-08-14 | End: 2025-08-14

## 2025-08-14 RX ADMIN — OXYBUTYNIN CHLORIDE 30 MG: 10 TABLET, EXTENDED RELEASE ORAL at 08:52

## 2025-08-14 RX ADMIN — Medication 15 ML: at 05:01

## 2025-08-14 RX ADMIN — OLANZAPINE 15 MG: 15 TABLET, FILM COATED ORAL at 21:59

## 2025-08-14 RX ADMIN — FUROSEMIDE 60 MG: 10 INJECTION, SOLUTION INTRAMUSCULAR; INTRAVENOUS at 09:12

## 2025-08-14 RX ADMIN — KETOCONAZOLE CREAM, 2%: 20 CREAM TOPICAL at 19:40

## 2025-08-14 RX ADMIN — RIFAMPIN 300 MG: 300 CAPSULE ORAL at 10:36

## 2025-08-14 RX ADMIN — IPRATROPIUM BROMIDE AND ALBUTEROL SULFATE 3 ML: .5; 3 SOLUTION RESPIRATORY (INHALATION) at 08:19

## 2025-08-14 RX ADMIN — IPRATROPIUM BROMIDE AND ALBUTEROL SULFATE 3 ML: .5; 3 SOLUTION RESPIRATORY (INHALATION) at 21:05

## 2025-08-14 RX ADMIN — SENNOSIDES AND DOCUSATE SODIUM 1 TABLET: 50; 8.6 TABLET ORAL at 08:52

## 2025-08-14 RX ADMIN — MIRABEGRON 50 MG: 25 TABLET, FILM COATED, EXTENDED RELEASE ORAL at 08:52

## 2025-08-14 RX ADMIN — PANTOPRAZOLE SODIUM 40 MG: 40 TABLET, DELAYED RELEASE ORAL at 08:52

## 2025-08-14 RX ADMIN — IPRATROPIUM BROMIDE AND ALBUTEROL SULFATE 3 ML: .5; 3 SOLUTION RESPIRATORY (INHALATION) at 16:10

## 2025-08-14 RX ADMIN — Medication 1250 MG: at 21:59

## 2025-08-14 RX ADMIN — CEFEPIME 2 G: 2 INJECTION, POWDER, FOR SOLUTION INTRAVENOUS at 05:23

## 2025-08-14 RX ADMIN — KETOCONAZOLE CREAM, 2%: 20 CREAM TOPICAL at 10:36

## 2025-08-14 RX ADMIN — DULOXETINE 120 MG: 30 CAPSULE, DELAYED RELEASE ORAL at 08:52

## 2025-08-14 RX ADMIN — IPRATROPIUM BROMIDE AND ALBUTEROL SULFATE 3 ML: .5; 3 SOLUTION RESPIRATORY (INHALATION) at 12:03

## 2025-08-14 RX ADMIN — RIFAMPIN 300 MG: 300 CAPSULE ORAL at 22:00

## 2025-08-14 RX ADMIN — CEFEPIME 2 G: 2 INJECTION, POWDER, FOR SOLUTION INTRAVENOUS at 17:36

## 2025-08-14 RX ADMIN — POTASSIUM CHLORIDE 40 MEQ: 1500 TABLET, EXTENDED RELEASE ORAL at 05:49

## 2025-08-14 RX ADMIN — FUROSEMIDE 60 MG: 10 INJECTION, SOLUTION INTRAMUSCULAR; INTRAVENOUS at 15:48

## 2025-08-14 RX ADMIN — Medication 1250 MG: at 09:00

## 2025-08-14 RX ADMIN — WARFARIN SODIUM 7.5 MG: 7.5 TABLET ORAL at 17:36

## 2025-08-14 RX ADMIN — POTASSIUM CHLORIDE 40 MEQ: 1500 TABLET, EXTENDED RELEASE ORAL at 13:12

## 2025-08-14 RX ADMIN — Medication 200 MG: at 08:52

## 2025-08-14 RX ADMIN — ALLOPURINOL 300 MG: 300 TABLET ORAL at 08:52

## 2025-08-14 RX ADMIN — SENNOSIDES AND DOCUSATE SODIUM 1 TABLET: 50; 8.6 TABLET ORAL at 19:39

## 2025-08-14 RX ADMIN — PANTOPRAZOLE SODIUM 40 MG: 40 TABLET, DELAYED RELEASE ORAL at 15:49

## 2025-08-14 RX ADMIN — KETOROLAC TROMETHAMINE 30 MG: 15 INJECTION, SOLUTION INTRAMUSCULAR; INTRAVENOUS at 17:22

## 2025-08-14 ASSESSMENT — ACTIVITIES OF DAILY LIVING (ADL)
ADLS_ACUITY_SCORE: 45
ADLS_ACUITY_SCORE: 46
ADLS_ACUITY_SCORE: 46
ADLS_ACUITY_SCORE: 45
ADLS_ACUITY_SCORE: 46
ADLS_ACUITY_SCORE: 46
ADLS_ACUITY_SCORE: 45
ADLS_ACUITY_SCORE: 46
ADLS_ACUITY_SCORE: 45
ADLS_ACUITY_SCORE: 46
ADLS_ACUITY_SCORE: 45
ADLS_ACUITY_SCORE: 45
ADLS_ACUITY_SCORE: 46
ADLS_ACUITY_SCORE: 45
ADLS_ACUITY_SCORE: 46

## 2025-08-15 LAB
ANION GAP SERPL CALCULATED.3IONS-SCNC: 9 MMOL/L (ref 7–15)
BACTERIA SPEC CULT: NO GROWTH
BACTERIA SPEC CULT: NO GROWTH
BUN SERPL-MCNC: 16.7 MG/DL (ref 6–20)
CA-I BLD-MCNC: 4.8 MG/DL (ref 4.4–5.2)
CALCIUM SERPL-MCNC: 8.1 MG/DL (ref 8.8–10.4)
CHLORIDE SERPL-SCNC: 101 MMOL/L (ref 98–107)
CREAT SERPL-MCNC: 1.16 MG/DL (ref 0.67–1.17)
EGFRCR SERPLBLD CKD-EPI 2021: 80 ML/MIN/1.73M2
ERYTHROCYTE [DISTWIDTH] IN BLOOD BY AUTOMATED COUNT: 14.7 % (ref 10–15)
GLUCOSE SERPL-MCNC: 114 MG/DL (ref 70–99)
HCO3 SERPL-SCNC: 29 MMOL/L (ref 22–29)
HCT VFR BLD AUTO: 22.3 % (ref 40–53)
HGB BLD-MCNC: 7.3 G/DL (ref 13.3–17.7)
INR PPP: 2.67 (ref 0.85–1.15)
MAGNESIUM SERPL-MCNC: 1.8 MG/DL (ref 1.7–2.3)
MCH RBC QN AUTO: 28 PG (ref 26.5–33)
MCHC RBC AUTO-ENTMCNC: 32.7 G/DL (ref 31.5–36.5)
MCV RBC AUTO: 85.4 FL (ref 78–100)
PLATELET # BLD AUTO: 217 10E3/UL (ref 150–450)
POTASSIUM SERPL-SCNC: 3 MMOL/L (ref 3.4–5.3)
POTASSIUM SERPL-SCNC: 3.6 MMOL/L (ref 3.4–5.3)
PROTHROMBIN TIME: 28.7 SECONDS (ref 11.8–14.8)
RBC # BLD AUTO: 2.61 10E6/UL (ref 4.4–5.9)
SODIUM SERPL-SCNC: 139 MMOL/L (ref 135–145)
VANCOMYCIN SERPL-MCNC: 17.1 UG/ML (ref ?–25)
VANCOMYCIN SERPL-MCNC: 27.4 UG/ML (ref ?–25)
WBC # BLD AUTO: 7.94 10E3/UL (ref 4–11)

## 2025-08-15 PROCEDURE — 250N000011 HC RX IP 250 OP 636: Performed by: FAMILY MEDICINE

## 2025-08-15 PROCEDURE — 250N000009 HC RX 250

## 2025-08-15 PROCEDURE — 83735 ASSAY OF MAGNESIUM: CPT | Performed by: INTERNAL MEDICINE

## 2025-08-15 PROCEDURE — 250N000013 HC RX MED GY IP 250 OP 250 PS 637: Performed by: FAMILY MEDICINE

## 2025-08-15 PROCEDURE — 120N000003 HC R&B IMCU UMMC

## 2025-08-15 PROCEDURE — 250N000011 HC RX IP 250 OP 636

## 2025-08-15 PROCEDURE — 94640 AIRWAY INHALATION TREATMENT: CPT | Mod: 76

## 2025-08-15 PROCEDURE — G0545 PR INHRENT VISIT TO INPT/OBS W CNFRM/SUSPCT INFCT DIS BY INFCT DIS SPCIALST: HCPCS | Performed by: INTERNAL MEDICINE

## 2025-08-15 PROCEDURE — 99233 SBSQ HOSP IP/OBS HIGH 50: CPT | Performed by: INTERNAL MEDICINE

## 2025-08-15 PROCEDURE — 85014 HEMATOCRIT: CPT

## 2025-08-15 PROCEDURE — 80202 ASSAY OF VANCOMYCIN: CPT | Performed by: FAMILY MEDICINE

## 2025-08-15 PROCEDURE — 84132 ASSAY OF SERUM POTASSIUM: CPT

## 2025-08-15 PROCEDURE — 250N000013 HC RX MED GY IP 250 OP 250 PS 637

## 2025-08-15 PROCEDURE — 82330 ASSAY OF CALCIUM: CPT

## 2025-08-15 PROCEDURE — 99232 SBSQ HOSP IP/OBS MODERATE 35: CPT | Mod: GC

## 2025-08-15 PROCEDURE — 80048 BASIC METABOLIC PNL TOTAL CA: CPT

## 2025-08-15 PROCEDURE — 85610 PROTHROMBIN TIME: CPT

## 2025-08-15 PROCEDURE — 94660 CPAP INITIATION&MGMT: CPT

## 2025-08-15 PROCEDURE — 999N000157 HC STATISTIC RCP TIME EA 10 MIN

## 2025-08-15 RX ORDER — POTASSIUM CHLORIDE 20MEQ/15ML
20 LIQUID (ML) ORAL ONCE
Status: COMPLETED | OUTPATIENT
Start: 2025-08-15 | End: 2025-08-15

## 2025-08-15 RX ORDER — FUROSEMIDE 10 MG/ML
60 INJECTION INTRAMUSCULAR; INTRAVENOUS ONCE
Status: COMPLETED | OUTPATIENT
Start: 2025-08-15 | End: 2025-08-15

## 2025-08-15 RX ORDER — SODIUM CHLORIDE FOR INHALATION 10 %
15 VIAL, NEBULIZER (ML) INHALATION ONCE
Status: COMPLETED | OUTPATIENT
Start: 2025-08-15 | End: 2025-08-15

## 2025-08-15 RX ORDER — POTASSIUM CHLORIDE 20MEQ/15ML
20 LIQUID (ML) ORAL ONCE
Status: DISCONTINUED | OUTPATIENT
Start: 2025-08-15 | End: 2025-08-15

## 2025-08-15 RX ORDER — FUROSEMIDE 10 MG/ML
60 INJECTION INTRAMUSCULAR; INTRAVENOUS EVERY 6 HOURS
Status: CANCELLED | OUTPATIENT
Start: 2025-08-15 | End: 2025-08-15

## 2025-08-15 RX ORDER — POTASSIUM CHLORIDE 20MEQ/15ML
40 LIQUID (ML) ORAL ONCE
Status: COMPLETED | OUTPATIENT
Start: 2025-08-15 | End: 2025-08-15

## 2025-08-15 RX ORDER — POTASSIUM CHLORIDE 20MEQ/15ML
40 LIQUID (ML) ORAL ONCE
Status: DISCONTINUED | OUTPATIENT
Start: 2025-08-15 | End: 2025-08-15

## 2025-08-15 RX ORDER — WARFARIN SODIUM 7.5 MG/1
7.5 TABLET ORAL
Status: COMPLETED | OUTPATIENT
Start: 2025-08-15 | End: 2025-08-15

## 2025-08-15 RX ADMIN — Medication 200 MG: at 09:46

## 2025-08-15 RX ADMIN — Medication 1250 MG: at 13:29

## 2025-08-15 RX ADMIN — POTASSIUM CHLORIDE 20 MEQ: 40 SOLUTION ORAL at 13:31

## 2025-08-15 RX ADMIN — Medication 15 ML: at 09:37

## 2025-08-15 RX ADMIN — POTASSIUM CHLORIDE 40 MEQ: 40 SOLUTION ORAL at 09:45

## 2025-08-15 RX ADMIN — IPRATROPIUM BROMIDE AND ALBUTEROL SULFATE 3 ML: .5; 3 SOLUTION RESPIRATORY (INHALATION) at 12:07

## 2025-08-15 RX ADMIN — MIRABEGRON 50 MG: 25 TABLET, FILM COATED, EXTENDED RELEASE ORAL at 09:50

## 2025-08-15 RX ADMIN — PANTOPRAZOLE SODIUM 40 MG: 40 TABLET, DELAYED RELEASE ORAL at 09:46

## 2025-08-15 RX ADMIN — IPRATROPIUM BROMIDE AND ALBUTEROL SULFATE 3 ML: .5; 3 SOLUTION RESPIRATORY (INHALATION) at 16:09

## 2025-08-15 RX ADMIN — CEFEPIME 2 G: 2 INJECTION, POWDER, FOR SOLUTION INTRAVENOUS at 04:28

## 2025-08-15 RX ADMIN — ALLOPURINOL 300 MG: 300 TABLET ORAL at 09:46

## 2025-08-15 RX ADMIN — RIFAMPIN 300 MG: 300 CAPSULE ORAL at 10:32

## 2025-08-15 RX ADMIN — KETOCONAZOLE CREAM, 2%: 20 CREAM TOPICAL at 09:50

## 2025-08-15 RX ADMIN — KETOCONAZOLE CREAM, 2%: 20 CREAM TOPICAL at 19:37

## 2025-08-15 RX ADMIN — RIFAMPIN 300 MG: 300 CAPSULE ORAL at 23:34

## 2025-08-15 RX ADMIN — Medication 1250 MG: at 23:35

## 2025-08-15 RX ADMIN — OXYBUTYNIN CHLORIDE 30 MG: 10 TABLET, EXTENDED RELEASE ORAL at 09:46

## 2025-08-15 RX ADMIN — WARFARIN SODIUM 7.5 MG: 7.5 TABLET ORAL at 18:58

## 2025-08-15 RX ADMIN — FUROSEMIDE 60 MG: 10 INJECTION, SOLUTION INTRAMUSCULAR; INTRAVENOUS at 10:32

## 2025-08-15 RX ADMIN — DULOXETINE 120 MG: 30 CAPSULE, DELAYED RELEASE ORAL at 09:46

## 2025-08-15 RX ADMIN — OLANZAPINE 15 MG: 15 TABLET, FILM COATED ORAL at 21:53

## 2025-08-15 RX ADMIN — FUROSEMIDE 60 MG: 10 INJECTION, SOLUTION INTRAMUSCULAR; INTRAVENOUS at 13:34

## 2025-08-15 RX ADMIN — CEFEPIME 2 G: 2 INJECTION, POWDER, FOR SOLUTION INTRAVENOUS at 16:59

## 2025-08-15 RX ADMIN — PANTOPRAZOLE SODIUM 40 MG: 40 TABLET, DELAYED RELEASE ORAL at 16:59

## 2025-08-15 RX ADMIN — IPRATROPIUM BROMIDE AND ALBUTEROL SULFATE 3 ML: .5; 3 SOLUTION RESPIRATORY (INHALATION) at 07:59

## 2025-08-15 RX ADMIN — IPRATROPIUM BROMIDE AND ALBUTEROL SULFATE 3 ML: .5; 3 SOLUTION RESPIRATORY (INHALATION) at 20:05

## 2025-08-15 ASSESSMENT — ACTIVITIES OF DAILY LIVING (ADL)
ADLS_ACUITY_SCORE: 45
ADLS_ACUITY_SCORE: 42
ADLS_ACUITY_SCORE: 45
ADLS_ACUITY_SCORE: 42
ADLS_ACUITY_SCORE: 45
ADLS_ACUITY_SCORE: 45
ADLS_ACUITY_SCORE: 42
ADLS_ACUITY_SCORE: 45
ADLS_ACUITY_SCORE: 42
ADLS_ACUITY_SCORE: 45
ADLS_ACUITY_SCORE: 42
ADLS_ACUITY_SCORE: 45
ADLS_ACUITY_SCORE: 42

## 2025-08-16 ENCOUNTER — HEALTH MAINTENANCE LETTER (OUTPATIENT)
Age: 45
End: 2025-08-16

## 2025-08-16 LAB
ANION GAP SERPL CALCULATED.3IONS-SCNC: 10 MMOL/L (ref 7–15)
BACTERIA SPEC CULT: NO GROWTH
BACTERIA SPEC CULT: NO GROWTH
BUN SERPL-MCNC: 15 MG/DL (ref 6–20)
CALCIUM SERPL-MCNC: 8.8 MG/DL (ref 8.8–10.4)
CHLORIDE SERPL-SCNC: 98 MMOL/L (ref 98–107)
CREAT SERPL-MCNC: 1.13 MG/DL (ref 0.67–1.17)
EGFRCR SERPLBLD CKD-EPI 2021: 83 ML/MIN/1.73M2
ERYTHROCYTE [DISTWIDTH] IN BLOOD BY AUTOMATED COUNT: 14.6 % (ref 10–15)
GLUCOSE SERPL-MCNC: 98 MG/DL (ref 70–99)
HCO3 SERPL-SCNC: 29 MMOL/L (ref 22–29)
HCT VFR BLD AUTO: 27.2 % (ref 40–53)
HGB BLD-MCNC: 8.7 G/DL (ref 13.3–17.7)
INR PPP: 2.34 (ref 0.85–1.15)
MCH RBC QN AUTO: 28.2 PG (ref 26.5–33)
MCHC RBC AUTO-ENTMCNC: 32 G/DL (ref 31.5–36.5)
MCV RBC AUTO: 88.3 FL (ref 78–100)
PLATELET # BLD AUTO: 290 10E3/UL (ref 150–450)
POTASSIUM SERPL-SCNC: 3.3 MMOL/L (ref 3.4–5.3)
PROTHROMBIN TIME: 26.1 SECONDS (ref 11.8–14.8)
RBC # BLD AUTO: 3.08 10E6/UL (ref 4.4–5.9)
SODIUM SERPL-SCNC: 137 MMOL/L (ref 135–145)
WBC # BLD AUTO: 9 10E3/UL (ref 4–11)

## 2025-08-16 PROCEDURE — 250N000013 HC RX MED GY IP 250 OP 250 PS 637

## 2025-08-16 PROCEDURE — 250N000011 HC RX IP 250 OP 636

## 2025-08-16 PROCEDURE — 999N000157 HC STATISTIC RCP TIME EA 10 MIN

## 2025-08-16 PROCEDURE — 94640 AIRWAY INHALATION TREATMENT: CPT | Mod: 76

## 2025-08-16 PROCEDURE — 250N000009 HC RX 250

## 2025-08-16 PROCEDURE — 94660 CPAP INITIATION&MGMT: CPT

## 2025-08-16 PROCEDURE — 250N000011 HC RX IP 250 OP 636: Performed by: FAMILY MEDICINE

## 2025-08-16 PROCEDURE — 36415 COLL VENOUS BLD VENIPUNCTURE: CPT

## 2025-08-16 PROCEDURE — 94640 AIRWAY INHALATION TREATMENT: CPT

## 2025-08-16 PROCEDURE — 120N000003 HC R&B IMCU UMMC

## 2025-08-16 PROCEDURE — 99232 SBSQ HOSP IP/OBS MODERATE 35: CPT | Mod: GC

## 2025-08-16 PROCEDURE — 250N000013 HC RX MED GY IP 250 OP 250 PS 637: Performed by: FAMILY MEDICINE

## 2025-08-16 PROCEDURE — 80048 BASIC METABOLIC PNL TOTAL CA: CPT

## 2025-08-16 PROCEDURE — 85027 COMPLETE CBC AUTOMATED: CPT

## 2025-08-16 PROCEDURE — 85610 PROTHROMBIN TIME: CPT

## 2025-08-16 RX ORDER — POTASSIUM CHLORIDE 1500 MG/1
40 TABLET, EXTENDED RELEASE ORAL ONCE
Status: COMPLETED | OUTPATIENT
Start: 2025-08-16 | End: 2025-08-16

## 2025-08-16 RX ORDER — FUROSEMIDE 10 MG/ML
60 INJECTION INTRAMUSCULAR; INTRAVENOUS EVERY 6 HOURS
Status: COMPLETED | OUTPATIENT
Start: 2025-08-16 | End: 2025-08-16

## 2025-08-16 RX ORDER — CEFEPIME HYDROCHLORIDE 2 G/1
2 INJECTION, POWDER, FOR SOLUTION INTRAVENOUS EVERY 12 HOURS
Status: COMPLETED | OUTPATIENT
Start: 2025-08-17 | End: 2025-08-17

## 2025-08-16 RX ORDER — WARFARIN SODIUM 7.5 MG/1
7.5 TABLET ORAL
Status: COMPLETED | OUTPATIENT
Start: 2025-08-16 | End: 2025-08-16

## 2025-08-16 RX ORDER — HYDROMORPHONE HYDROCHLORIDE 2 MG/1
2 TABLET ORAL EVERY 12 HOURS PRN
Refills: 0 | Status: DISCONTINUED | OUTPATIENT
Start: 2025-08-16 | End: 2025-08-17

## 2025-08-16 RX ORDER — FUROSEMIDE 10 MG/ML
40 INJECTION INTRAMUSCULAR; INTRAVENOUS ONCE
Status: CANCELLED | OUTPATIENT
Start: 2025-08-16

## 2025-08-16 RX ADMIN — IPRATROPIUM BROMIDE AND ALBUTEROL SULFATE 3 ML: .5; 3 SOLUTION RESPIRATORY (INHALATION) at 09:13

## 2025-08-16 RX ADMIN — OLANZAPINE 15 MG: 15 TABLET, FILM COATED ORAL at 22:35

## 2025-08-16 RX ADMIN — OXYBUTYNIN CHLORIDE 30 MG: 10 TABLET, EXTENDED RELEASE ORAL at 09:34

## 2025-08-16 RX ADMIN — CEFEPIME 2 G: 2 INJECTION, POWDER, FOR SOLUTION INTRAVENOUS at 06:26

## 2025-08-16 RX ADMIN — Medication 1250 MG: at 11:27

## 2025-08-16 RX ADMIN — RIFAMPIN 300 MG: 300 CAPSULE ORAL at 22:34

## 2025-08-16 RX ADMIN — PANTOPRAZOLE SODIUM 40 MG: 40 TABLET, DELAYED RELEASE ORAL at 09:35

## 2025-08-16 RX ADMIN — WARFARIN SODIUM 7.5 MG: 7.5 TABLET ORAL at 17:36

## 2025-08-16 RX ADMIN — POTASSIUM CHLORIDE 40 MEQ: 1500 TABLET, EXTENDED RELEASE ORAL at 21:04

## 2025-08-16 RX ADMIN — KETOCONAZOLE CREAM, 2%: 20 CREAM TOPICAL at 11:30

## 2025-08-16 RX ADMIN — IPRATROPIUM BROMIDE AND ALBUTEROL SULFATE 3 ML: .5; 3 SOLUTION RESPIRATORY (INHALATION) at 16:37

## 2025-08-16 RX ADMIN — IPRATROPIUM BROMIDE AND ALBUTEROL SULFATE 3 ML: .5; 3 SOLUTION RESPIRATORY (INHALATION) at 19:52

## 2025-08-16 RX ADMIN — SENNOSIDES, DOCUSATE SODIUM 2 TABLET: 50; 8.6 TABLET, FILM COATED ORAL at 09:35

## 2025-08-16 RX ADMIN — PANTOPRAZOLE SODIUM 40 MG: 40 TABLET, DELAYED RELEASE ORAL at 17:36

## 2025-08-16 RX ADMIN — ALLOPURINOL 300 MG: 300 TABLET ORAL at 09:34

## 2025-08-16 RX ADMIN — MIRABEGRON 50 MG: 25 TABLET, FILM COATED, EXTENDED RELEASE ORAL at 11:26

## 2025-08-16 RX ADMIN — RIFAMPIN 300 MG: 300 CAPSULE ORAL at 11:25

## 2025-08-16 RX ADMIN — FUROSEMIDE 60 MG: 10 INJECTION, SOLUTION INTRAMUSCULAR; INTRAVENOUS at 17:34

## 2025-08-16 RX ADMIN — ACETAMINOPHEN 1000 MG: 500 TABLET ORAL at 22:35

## 2025-08-16 RX ADMIN — Medication 200 MG: at 09:34

## 2025-08-16 RX ADMIN — SENNOSIDES, DOCUSATE SODIUM 2 TABLET: 50; 8.6 TABLET, FILM COATED ORAL at 21:04

## 2025-08-16 RX ADMIN — POLYETHYLENE GLYCOL 3350 17 G: 17 POWDER, FOR SOLUTION ORAL at 09:34

## 2025-08-16 RX ADMIN — IPRATROPIUM BROMIDE AND ALBUTEROL SULFATE 3 ML: .5; 3 SOLUTION RESPIRATORY (INHALATION) at 12:34

## 2025-08-16 RX ADMIN — FUROSEMIDE 60 MG: 10 INJECTION, SOLUTION INTRAMUSCULAR; INTRAVENOUS at 11:22

## 2025-08-16 RX ADMIN — DULOXETINE 120 MG: 30 CAPSULE, DELAYED RELEASE ORAL at 09:35

## 2025-08-16 ASSESSMENT — ACTIVITIES OF DAILY LIVING (ADL)
ADLS_ACUITY_SCORE: 42

## 2025-08-17 LAB
ANION GAP SERPL CALCULATED.3IONS-SCNC: 11 MMOL/L (ref 7–15)
BUN SERPL-MCNC: 16.1 MG/DL (ref 6–20)
CALCIUM SERPL-MCNC: 9 MG/DL (ref 8.8–10.4)
CHLORIDE SERPL-SCNC: 100 MMOL/L (ref 98–107)
CREAT SERPL-MCNC: 1.2 MG/DL (ref 0.67–1.17)
EGFRCR SERPLBLD CKD-EPI 2021: 77 ML/MIN/1.73M2
ERYTHROCYTE [DISTWIDTH] IN BLOOD BY AUTOMATED COUNT: 14.8 % (ref 10–15)
GLUCOSE SERPL-MCNC: 102 MG/DL (ref 70–99)
HCO3 SERPL-SCNC: 30 MMOL/L (ref 22–29)
HCT VFR BLD AUTO: 27.6 % (ref 40–53)
HGB BLD-MCNC: 8.8 G/DL (ref 13.3–17.7)
HOLD SPECIMEN: NORMAL
INR PPP: 2.19 (ref 0.85–1.15)
MAGNESIUM SERPL-MCNC: 2 MG/DL (ref 1.7–2.3)
MCH RBC QN AUTO: 27.6 PG (ref 26.5–33)
MCHC RBC AUTO-ENTMCNC: 31.9 G/DL (ref 31.5–36.5)
MCV RBC AUTO: 86.5 FL (ref 78–100)
PLATELET # BLD AUTO: 349 10E3/UL (ref 150–450)
POTASSIUM SERPL-SCNC: 3.2 MMOL/L (ref 3.4–5.3)
POTASSIUM SERPL-SCNC: 3.4 MMOL/L (ref 3.4–5.3)
POTASSIUM SERPL-SCNC: 3.4 MMOL/L (ref 3.4–5.3)
PROTHROMBIN TIME: 24.8 SECONDS (ref 11.8–14.8)
RBC # BLD AUTO: 3.19 10E6/UL (ref 4.4–5.9)
SODIUM SERPL-SCNC: 141 MMOL/L (ref 135–145)
WBC # BLD AUTO: 8.34 10E3/UL (ref 4–11)

## 2025-08-17 PROCEDURE — 250N000013 HC RX MED GY IP 250 OP 250 PS 637

## 2025-08-17 PROCEDURE — 999N000157 HC STATISTIC RCP TIME EA 10 MIN

## 2025-08-17 PROCEDURE — 250N000011 HC RX IP 250 OP 636

## 2025-08-17 PROCEDURE — 120N000003 HC R&B IMCU UMMC

## 2025-08-17 PROCEDURE — 83735 ASSAY OF MAGNESIUM: CPT

## 2025-08-17 PROCEDURE — 250N000013 HC RX MED GY IP 250 OP 250 PS 637: Performed by: FAMILY MEDICINE

## 2025-08-17 PROCEDURE — 250N000011 HC RX IP 250 OP 636: Performed by: FAMILY MEDICINE

## 2025-08-17 PROCEDURE — 36415 COLL VENOUS BLD VENIPUNCTURE: CPT

## 2025-08-17 PROCEDURE — 80048 BASIC METABOLIC PNL TOTAL CA: CPT

## 2025-08-17 PROCEDURE — 85610 PROTHROMBIN TIME: CPT

## 2025-08-17 PROCEDURE — 999N000040 HC STATISTIC CONSULT NO CHARGE VASC ACCESS

## 2025-08-17 PROCEDURE — 250N000009 HC RX 250

## 2025-08-17 PROCEDURE — 85027 COMPLETE CBC AUTOMATED: CPT

## 2025-08-17 PROCEDURE — 82310 ASSAY OF CALCIUM: CPT

## 2025-08-17 PROCEDURE — 94660 CPAP INITIATION&MGMT: CPT

## 2025-08-17 PROCEDURE — 94640 AIRWAY INHALATION TREATMENT: CPT | Mod: 76

## 2025-08-17 PROCEDURE — 99232 SBSQ HOSP IP/OBS MODERATE 35: CPT | Mod: GC

## 2025-08-17 RX ORDER — POTASSIUM CHLORIDE 1500 MG/1
40 TABLET, EXTENDED RELEASE ORAL ONCE
Status: COMPLETED | OUTPATIENT
Start: 2025-08-17 | End: 2025-08-17

## 2025-08-17 RX ORDER — WARFARIN SODIUM 10 MG/1
10 TABLET ORAL
Status: COMPLETED | OUTPATIENT
Start: 2025-08-17 | End: 2025-08-17

## 2025-08-17 RX ORDER — HEPARIN SODIUM,PORCINE 10 UNIT/ML
3 VIAL (ML) INTRAVENOUS
Status: DISCONTINUED | OUTPATIENT
Start: 2025-08-17 | End: 2025-08-22 | Stop reason: HOSPADM

## 2025-08-17 RX ORDER — FUROSEMIDE 10 MG/ML
40 INJECTION INTRAMUSCULAR; INTRAVENOUS EVERY 6 HOURS
Status: COMPLETED | OUTPATIENT
Start: 2025-08-17 | End: 2025-08-17

## 2025-08-17 RX ORDER — LIDOCAINE 40 MG/G
CREAM TOPICAL
Status: CANCELLED | OUTPATIENT
Start: 2025-08-17 | End: 2025-08-20

## 2025-08-17 RX ADMIN — DULOXETINE 120 MG: 30 CAPSULE, DELAYED RELEASE ORAL at 10:33

## 2025-08-17 RX ADMIN — FUROSEMIDE 40 MG: 10 INJECTION, SOLUTION INTRAMUSCULAR; INTRAVENOUS at 10:33

## 2025-08-17 RX ADMIN — IPRATROPIUM BROMIDE AND ALBUTEROL SULFATE 3 ML: .5; 3 SOLUTION RESPIRATORY (INHALATION) at 09:06

## 2025-08-17 RX ADMIN — Medication 1250 MG: at 23:55

## 2025-08-17 RX ADMIN — OLANZAPINE 15 MG: 15 TABLET, FILM COATED ORAL at 22:41

## 2025-08-17 RX ADMIN — POTASSIUM CHLORIDE 40 MEQ: 1500 TABLET, EXTENDED RELEASE ORAL at 01:48

## 2025-08-17 RX ADMIN — ALLOPURINOL 300 MG: 300 TABLET ORAL at 10:33

## 2025-08-17 RX ADMIN — PANTOPRAZOLE SODIUM 40 MG: 40 TABLET, DELAYED RELEASE ORAL at 10:32

## 2025-08-17 RX ADMIN — IPRATROPIUM BROMIDE AND ALBUTEROL SULFATE 3 ML: .5; 3 SOLUTION RESPIRATORY (INHALATION) at 11:48

## 2025-08-17 RX ADMIN — IPRATROPIUM BROMIDE AND ALBUTEROL SULFATE 3 ML: .5; 3 SOLUTION RESPIRATORY (INHALATION) at 16:50

## 2025-08-17 RX ADMIN — IPRATROPIUM BROMIDE AND ALBUTEROL SULFATE 3 ML: .5; 3 SOLUTION RESPIRATORY (INHALATION) at 20:03

## 2025-08-17 RX ADMIN — Medication 200 MG: at 10:33

## 2025-08-17 RX ADMIN — PANTOPRAZOLE SODIUM 40 MG: 40 TABLET, DELAYED RELEASE ORAL at 17:15

## 2025-08-17 RX ADMIN — Medication 1250 MG: at 00:58

## 2025-08-17 RX ADMIN — SENNOSIDES, DOCUSATE SODIUM 2 TABLET: 50; 8.6 TABLET, FILM COATED ORAL at 19:05

## 2025-08-17 RX ADMIN — Medication 3 ML: at 19:05

## 2025-08-17 RX ADMIN — OXYBUTYNIN CHLORIDE 30 MG: 10 TABLET, EXTENDED RELEASE ORAL at 10:32

## 2025-08-17 RX ADMIN — RIFAMPIN 300 MG: 300 CAPSULE ORAL at 10:32

## 2025-08-17 RX ADMIN — CEFEPIME 2 G: 2 INJECTION, POWDER, FOR SOLUTION INTRAVENOUS at 17:31

## 2025-08-17 RX ADMIN — FUROSEMIDE 40 MG: 10 INJECTION, SOLUTION INTRAMUSCULAR; INTRAVENOUS at 17:15

## 2025-08-17 RX ADMIN — CEFEPIME 2 G: 2 INJECTION, POWDER, FOR SOLUTION INTRAVENOUS at 05:09

## 2025-08-17 RX ADMIN — WARFARIN SODIUM 10 MG: 10 TABLET ORAL at 18:35

## 2025-08-17 RX ADMIN — KETOCONAZOLE CREAM, 2%: 20 CREAM TOPICAL at 20:31

## 2025-08-17 RX ADMIN — RIFAMPIN 300 MG: 300 CAPSULE ORAL at 22:41

## 2025-08-17 RX ADMIN — MIRABEGRON 50 MG: 25 TABLET, FILM COATED, EXTENDED RELEASE ORAL at 10:32

## 2025-08-17 RX ADMIN — Medication 1250 MG: at 14:52

## 2025-08-17 ASSESSMENT — ACTIVITIES OF DAILY LIVING (ADL)
ADLS_ACUITY_SCORE: 42

## 2025-08-18 ENCOUNTER — APPOINTMENT (OUTPATIENT)
Dept: GENERAL RADIOLOGY | Facility: CLINIC | Age: 45
DRG: 177 | End: 2025-08-18
Payer: COMMERCIAL

## 2025-08-18 ENCOUNTER — APPOINTMENT (OUTPATIENT)
Dept: PHYSICAL THERAPY | Facility: CLINIC | Age: 45
DRG: 177 | End: 2025-08-18
Attending: STUDENT IN AN ORGANIZED HEALTH CARE EDUCATION/TRAINING PROGRAM
Payer: COMMERCIAL

## 2025-08-18 PROBLEM — I51.7 LEFT VENTRICULAR HYPERTROPHY: Chronic | Status: ACTIVE | Noted: 2025-08-18

## 2025-08-18 LAB
ANION GAP SERPL CALCULATED.3IONS-SCNC: 13 MMOL/L (ref 7–15)
BASOPHILS # BLD AUTO: 0.08 10E3/UL (ref 0–0.2)
BASOPHILS NFR BLD AUTO: 0.8 %
BUN SERPL-MCNC: 15.8 MG/DL (ref 6–20)
CALCIUM SERPL-MCNC: 8.8 MG/DL (ref 8.8–10.4)
CHLORIDE SERPL-SCNC: 98 MMOL/L (ref 98–107)
CREAT SERPL-MCNC: 1.36 MG/DL (ref 0.67–1.17)
CREAT SERPL-MCNC: 1.44 MG/DL (ref 0.67–1.17)
EGFRCR SERPLBLD CKD-EPI 2021: 62 ML/MIN/1.73M2
EGFRCR SERPLBLD CKD-EPI 2021: 66 ML/MIN/1.73M2
EOSINOPHIL # BLD AUTO: 1 10E3/UL (ref 0–0.7)
EOSINOPHIL NFR BLD AUTO: 10.5 %
ERYTHROCYTE [DISTWIDTH] IN BLOOD BY AUTOMATED COUNT: 14.7 % (ref 10–15)
GLUCOSE SERPL-MCNC: 110 MG/DL (ref 70–99)
HCO3 SERPL-SCNC: 27 MMOL/L (ref 22–29)
HCT VFR BLD AUTO: 26 % (ref 40–53)
HGB BLD-MCNC: 8.4 G/DL (ref 13.3–17.7)
IMM GRANULOCYTES # BLD: 0.06 10E3/UL
IMM GRANULOCYTES NFR BLD: 0.6 %
INR PPP: 2.05 (ref 0.85–1.15)
LYMPHOCYTES # BLD AUTO: 1.94 10E3/UL (ref 0.8–5.3)
LYMPHOCYTES NFR BLD AUTO: 20.3 %
MAGNESIUM SERPL-MCNC: 1.7 MG/DL (ref 1.7–2.3)
MCH RBC QN AUTO: 27.8 PG (ref 26.5–33)
MCHC RBC AUTO-ENTMCNC: 32.3 G/DL (ref 31.5–36.5)
MCV RBC AUTO: 86.1 FL (ref 78–100)
MCV RBC AUTO: 88.9 FL (ref 78–100)
MONOCYTES # BLD AUTO: 0.51 10E3/UL (ref 0–1.3)
MONOCYTES NFR BLD AUTO: 5.3 %
NEUTROPHILS # BLD AUTO: 5.96 10E3/UL (ref 1.6–8.3)
NEUTROPHILS NFR BLD AUTO: 62.5 %
NRBC # BLD AUTO: <0.03 10E3/UL
NRBC BLD AUTO-RTO: 0 /100
PLATELET # BLD AUTO: 363 10E3/UL (ref 150–450)
POTASSIUM SERPL-SCNC: 3 MMOL/L (ref 3.4–5.3)
POTASSIUM SERPL-SCNC: 4 MMOL/L (ref 3.4–5.3)
PROTHROMBIN TIME: 23.5 SECONDS (ref 11.8–14.8)
RBC # BLD AUTO: 3.02 10E6/UL (ref 4.4–5.9)
SODIUM SERPL-SCNC: 138 MMOL/L (ref 135–145)
VANCOMYCIN SERPL-MCNC: 17.5 UG/ML (ref ?–25)
VANCOMYCIN SERPL-MCNC: 26.4 UG/ML (ref ?–25)
WBC # BLD AUTO: 9.55 10E3/UL (ref 4–11)
WBC # BLD AUTO: 9.62 10E3/UL (ref 4–11)

## 2025-08-18 PROCEDURE — 97530 THERAPEUTIC ACTIVITIES: CPT | Mod: GP

## 2025-08-18 PROCEDURE — 250N000011 HC RX IP 250 OP 636: Performed by: FAMILY MEDICINE

## 2025-08-18 PROCEDURE — 94640 AIRWAY INHALATION TREATMENT: CPT | Mod: 76

## 2025-08-18 PROCEDURE — 80048 BASIC METABOLIC PNL TOTAL CA: CPT

## 2025-08-18 PROCEDURE — 84132 ASSAY OF SERUM POTASSIUM: CPT | Performed by: FAMILY MEDICINE

## 2025-08-18 PROCEDURE — 250N000009 HC RX 250

## 2025-08-18 PROCEDURE — 250N000013 HC RX MED GY IP 250 OP 250 PS 637

## 2025-08-18 PROCEDURE — 94660 CPAP INITIATION&MGMT: CPT

## 2025-08-18 PROCEDURE — 71045 X-RAY EXAM CHEST 1 VIEW: CPT

## 2025-08-18 PROCEDURE — 999N000007 HC SITE CHECK

## 2025-08-18 PROCEDURE — 36415 COLL VENOUS BLD VENIPUNCTURE: CPT | Performed by: FAMILY MEDICINE

## 2025-08-18 PROCEDURE — 82565 ASSAY OF CREATININE: CPT | Performed by: FAMILY MEDICINE

## 2025-08-18 PROCEDURE — 80202 ASSAY OF VANCOMYCIN: CPT | Performed by: FAMILY MEDICINE

## 2025-08-18 PROCEDURE — 71045 X-RAY EXAM CHEST 1 VIEW: CPT | Mod: 26 | Performed by: RADIOLOGY

## 2025-08-18 PROCEDURE — 120N000002 HC R&B MED SURG/OB UMMC

## 2025-08-18 PROCEDURE — 250N000013 HC RX MED GY IP 250 OP 250 PS 637: Performed by: FAMILY MEDICINE

## 2025-08-18 PROCEDURE — 83735 ASSAY OF MAGNESIUM: CPT

## 2025-08-18 PROCEDURE — 999N000203 HC STATISTICAL VASC ACCESS NURSE TIME, 16-31 MINUTES

## 2025-08-18 PROCEDURE — 85610 PROTHROMBIN TIME: CPT

## 2025-08-18 PROCEDURE — 999N000157 HC STATISTIC RCP TIME EA 10 MIN

## 2025-08-18 PROCEDURE — 250N000011 HC RX IP 250 OP 636

## 2025-08-18 PROCEDURE — 250N000011 HC RX IP 250 OP 636: Mod: JZ

## 2025-08-18 PROCEDURE — 99233 SBSQ HOSP IP/OBS HIGH 50: CPT | Mod: GC | Performed by: INTERNAL MEDICINE

## 2025-08-18 PROCEDURE — 99232 SBSQ HOSP IP/OBS MODERATE 35: CPT | Mod: GC

## 2025-08-18 PROCEDURE — 999N000044 HC STATISTIC CVC DRESSING CHANGE

## 2025-08-18 PROCEDURE — 85014 HEMATOCRIT: CPT

## 2025-08-18 PROCEDURE — 97140 MANUAL THERAPY 1/> REGIONS: CPT | Mod: GP

## 2025-08-18 RX ORDER — POTASSIUM CHLORIDE 1500 MG/1
40 TABLET, EXTENDED RELEASE ORAL ONCE
Status: COMPLETED | OUTPATIENT
Start: 2025-08-18 | End: 2025-08-18

## 2025-08-18 RX ORDER — PREDNISONE 20 MG/1
20 TABLET ORAL DAILY
Status: DISCONTINUED | OUTPATIENT
Start: 2025-08-25 | End: 2025-08-22

## 2025-08-18 RX ORDER — POTASSIUM CHLORIDE 1500 MG/1
20 TABLET, EXTENDED RELEASE ORAL ONCE
Status: COMPLETED | OUTPATIENT
Start: 2025-08-18 | End: 2025-08-18

## 2025-08-18 RX ORDER — PREDNISONE 10 MG/1
10 TABLET ORAL DAILY
Status: DISCONTINUED | OUTPATIENT
Start: 2025-08-28 | End: 2025-08-22

## 2025-08-18 RX ORDER — WARFARIN SODIUM 10 MG/1
10 TABLET ORAL
Status: COMPLETED | OUTPATIENT
Start: 2025-08-18 | End: 2025-08-18

## 2025-08-18 RX ORDER — FUROSEMIDE 40 MG/1
40 TABLET ORAL
Status: COMPLETED | OUTPATIENT
Start: 2025-08-18 | End: 2025-08-18

## 2025-08-18 RX ORDER — PREDNISONE 20 MG/1
40 TABLET ORAL DAILY
Status: COMPLETED | OUTPATIENT
Start: 2025-08-19 | End: 2025-08-21

## 2025-08-18 RX ADMIN — Medication 200 MG: at 09:14

## 2025-08-18 RX ADMIN — OXYBUTYNIN CHLORIDE 30 MG: 10 TABLET, EXTENDED RELEASE ORAL at 09:14

## 2025-08-18 RX ADMIN — KETOCONAZOLE CREAM, 2%: 20 CREAM TOPICAL at 21:39

## 2025-08-18 RX ADMIN — POTASSIUM CHLORIDE 20 MEQ: 1500 TABLET, EXTENDED RELEASE ORAL at 09:14

## 2025-08-18 RX ADMIN — Medication 3 ML: at 23:24

## 2025-08-18 RX ADMIN — IPRATROPIUM BROMIDE AND ALBUTEROL SULFATE 3 ML: .5; 3 SOLUTION RESPIRATORY (INHALATION) at 16:29

## 2025-08-18 RX ADMIN — RIFAMPIN 300 MG: 300 CAPSULE ORAL at 12:26

## 2025-08-18 RX ADMIN — ALLOPURINOL 300 MG: 300 TABLET ORAL at 09:14

## 2025-08-18 RX ADMIN — POTASSIUM CHLORIDE 40 MEQ: 1500 TABLET, EXTENDED RELEASE ORAL at 09:14

## 2025-08-18 RX ADMIN — Medication 1000 MG: at 15:36

## 2025-08-18 RX ADMIN — ALTEPLASE 2 MG: 2.2 INJECTION, POWDER, LYOPHILIZED, FOR SOLUTION INTRAVENOUS at 18:58

## 2025-08-18 RX ADMIN — PANTOPRAZOLE SODIUM 40 MG: 40 TABLET, DELAYED RELEASE ORAL at 15:46

## 2025-08-18 RX ADMIN — RIFAMPIN 300 MG: 300 CAPSULE ORAL at 22:05

## 2025-08-18 RX ADMIN — IPRATROPIUM BROMIDE AND ALBUTEROL SULFATE 3 ML: .5; 3 SOLUTION RESPIRATORY (INHALATION) at 12:54

## 2025-08-18 RX ADMIN — MIRABEGRON 50 MG: 25 TABLET, FILM COATED, EXTENDED RELEASE ORAL at 09:14

## 2025-08-18 RX ADMIN — DULOXETINE 120 MG: 30 CAPSULE, DELAYED RELEASE ORAL at 09:13

## 2025-08-18 RX ADMIN — PANTOPRAZOLE SODIUM 40 MG: 40 TABLET, DELAYED RELEASE ORAL at 09:14

## 2025-08-18 RX ADMIN — FUROSEMIDE 40 MG: 40 TABLET ORAL at 17:16

## 2025-08-18 RX ADMIN — ACETAMINOPHEN 1000 MG: 500 TABLET ORAL at 15:46

## 2025-08-18 RX ADMIN — ALTEPLASE 2 MG: 2.2 INJECTION, POWDER, LYOPHILIZED, FOR SOLUTION INTRAVENOUS at 17:18

## 2025-08-18 RX ADMIN — OLANZAPINE 15 MG: 15 TABLET, FILM COATED ORAL at 21:38

## 2025-08-18 RX ADMIN — IPRATROPIUM BROMIDE AND ALBUTEROL SULFATE 3 ML: .5; 3 SOLUTION RESPIRATORY (INHALATION) at 19:31

## 2025-08-18 RX ADMIN — WARFARIN SODIUM 10 MG: 10 TABLET ORAL at 17:16

## 2025-08-18 RX ADMIN — FUROSEMIDE 40 MG: 40 TABLET ORAL at 12:25

## 2025-08-18 RX ADMIN — IPRATROPIUM BROMIDE AND ALBUTEROL SULFATE 3 ML: .5; 3 SOLUTION RESPIRATORY (INHALATION) at 08:16

## 2025-08-18 ASSESSMENT — ACTIVITIES OF DAILY LIVING (ADL)
ADLS_ACUITY_SCORE: 42

## 2025-08-19 ENCOUNTER — APPOINTMENT (OUTPATIENT)
Dept: PHYSICAL THERAPY | Facility: CLINIC | Age: 45
DRG: 177 | End: 2025-08-19
Attending: STUDENT IN AN ORGANIZED HEALTH CARE EDUCATION/TRAINING PROGRAM
Payer: COMMERCIAL

## 2025-08-19 LAB
ALBUMIN SERPL BCG-MCNC: 3 G/DL (ref 3.5–5.2)
ALP SERPL-CCNC: 55 U/L (ref 40–150)
ALT SERPL W P-5'-P-CCNC: 12 U/L (ref 0–70)
ANION GAP SERPL CALCULATED.3IONS-SCNC: 11 MMOL/L (ref 7–15)
AST SERPL W P-5'-P-CCNC: 20 U/L (ref 0–45)
BASOPHILS # BLD AUTO: 0.04 10E3/UL (ref 0–0.2)
BASOPHILS NFR BLD AUTO: 0.5 %
BILIRUB DIRECT SERPL-MCNC: <0.08 MG/DL (ref 0–0.3)
BILIRUB SERPL-MCNC: <0.2 MG/DL
BUN SERPL-MCNC: 16.5 MG/DL (ref 6–20)
CALCIUM SERPL-MCNC: 8.6 MG/DL (ref 8.8–10.4)
CHLORIDE SERPL-SCNC: 101 MMOL/L (ref 98–107)
CREAT SERPL-MCNC: 1.26 MG/DL (ref 0.67–1.17)
CRP SERPL-MCNC: 23.5 MG/L
EGFRCR SERPLBLD CKD-EPI 2021: 73 ML/MIN/1.73M2
EOSINOPHIL # BLD AUTO: 0.76 10E3/UL (ref 0–0.7)
EOSINOPHIL NFR BLD AUTO: 9.1 %
ERYTHROCYTE [DISTWIDTH] IN BLOOD BY AUTOMATED COUNT: 14.6 % (ref 10–15)
GLUCOSE SERPL-MCNC: 121 MG/DL (ref 70–99)
HCO3 SERPL-SCNC: 29 MMOL/L (ref 22–29)
HCT VFR BLD AUTO: 25.1 % (ref 40–53)
HGB BLD-MCNC: 8 G/DL (ref 13.3–17.7)
IMM GRANULOCYTES # BLD: 0.08 10E3/UL
IMM GRANULOCYTES NFR BLD: 1 %
INR PPP: 2.29 (ref 0.85–1.15)
LYMPHOCYTES # BLD AUTO: 1.82 10E3/UL (ref 0.8–5.3)
LYMPHOCYTES NFR BLD AUTO: 21.7 %
MAGNESIUM SERPL-MCNC: 2 MG/DL (ref 1.7–2.3)
MCH RBC QN AUTO: 27.8 PG (ref 26.5–33)
MCHC RBC AUTO-ENTMCNC: 31.9 G/DL (ref 31.5–36.5)
MCV RBC AUTO: 85.8 FL (ref 78–100)
MCV RBC AUTO: 87.2 FL (ref 78–100)
MONOCYTES # BLD AUTO: 0.5 10E3/UL (ref 0–1.3)
MONOCYTES NFR BLD AUTO: 6 %
NEUTROPHILS # BLD AUTO: 5.17 10E3/UL (ref 1.6–8.3)
NEUTROPHILS NFR BLD AUTO: 61.7 %
NRBC # BLD AUTO: <0.03 10E3/UL
NRBC BLD AUTO-RTO: 0 /100
PLATELET # BLD AUTO: 333 10E3/UL (ref 150–450)
POTASSIUM SERPL-SCNC: 3.1 MMOL/L (ref 3.4–5.3)
POTASSIUM SERPL-SCNC: 3.9 MMOL/L (ref 3.4–5.3)
PROT SERPL-MCNC: 6.8 G/DL (ref 6.4–8.3)
PROTHROMBIN TIME: 25.7 SECONDS (ref 11.8–14.8)
RBC # BLD AUTO: 2.88 10E6/UL (ref 4.4–5.9)
SODIUM SERPL-SCNC: 141 MMOL/L (ref 135–145)
WBC # BLD AUTO: 8.25 10E3/UL (ref 4–11)
WBC # BLD AUTO: 8.37 10E3/UL (ref 4–11)

## 2025-08-19 PROCEDURE — 250N000013 HC RX MED GY IP 250 OP 250 PS 637: Performed by: FAMILY MEDICINE

## 2025-08-19 PROCEDURE — 250N000011 HC RX IP 250 OP 636

## 2025-08-19 PROCEDURE — 999N000157 HC STATISTIC RCP TIME EA 10 MIN

## 2025-08-19 PROCEDURE — 99232 SBSQ HOSP IP/OBS MODERATE 35: CPT | Mod: GC

## 2025-08-19 PROCEDURE — 99233 SBSQ HOSP IP/OBS HIGH 50: CPT | Mod: 24 | Performed by: STUDENT IN AN ORGANIZED HEALTH CARE EDUCATION/TRAINING PROGRAM

## 2025-08-19 PROCEDURE — 84132 ASSAY OF SERUM POTASSIUM: CPT

## 2025-08-19 PROCEDURE — 250N000009 HC RX 250

## 2025-08-19 PROCEDURE — 94640 AIRWAY INHALATION TREATMENT: CPT | Mod: 76

## 2025-08-19 PROCEDURE — 85610 PROTHROMBIN TIME: CPT

## 2025-08-19 PROCEDURE — 85025 COMPLETE CBC W/AUTO DIFF WBC: CPT

## 2025-08-19 PROCEDURE — 120N000002 HC R&B MED SURG/OB UMMC

## 2025-08-19 PROCEDURE — 94660 CPAP INITIATION&MGMT: CPT

## 2025-08-19 PROCEDURE — 80048 BASIC METABOLIC PNL TOTAL CA: CPT

## 2025-08-19 PROCEDURE — 250N000013 HC RX MED GY IP 250 OP 250 PS 637

## 2025-08-19 PROCEDURE — 250N000012 HC RX MED GY IP 250 OP 636 PS 637

## 2025-08-19 PROCEDURE — 97140 MANUAL THERAPY 1/> REGIONS: CPT | Mod: GP

## 2025-08-19 PROCEDURE — 250N000011 HC RX IP 250 OP 636: Performed by: FAMILY MEDICINE

## 2025-08-19 PROCEDURE — 86140 C-REACTIVE PROTEIN: CPT | Performed by: STUDENT IN AN ORGANIZED HEALTH CARE EDUCATION/TRAINING PROGRAM

## 2025-08-19 PROCEDURE — 83735 ASSAY OF MAGNESIUM: CPT

## 2025-08-19 PROCEDURE — 97530 THERAPEUTIC ACTIVITIES: CPT | Mod: GP

## 2025-08-19 PROCEDURE — 82248 BILIRUBIN DIRECT: CPT

## 2025-08-19 PROCEDURE — G0545 PR INHRENT VISIT TO INPT/OBS W CNFRM/SUSPCT INFCT DIS BY INFCT DIS SPCIALST: HCPCS | Performed by: STUDENT IN AN ORGANIZED HEALTH CARE EDUCATION/TRAINING PROGRAM

## 2025-08-19 PROCEDURE — 36415 COLL VENOUS BLD VENIPUNCTURE: CPT

## 2025-08-19 PROCEDURE — 94640 AIRWAY INHALATION TREATMENT: CPT

## 2025-08-19 RX ORDER — KETOCONAZOLE 20 MG/ML
SHAMPOO, SUSPENSION TOPICAL DAILY PRN
Qty: 120 ML | Refills: 0 | DISCHARGE
Start: 2025-08-19 | End: 2025-08-21

## 2025-08-19 RX ORDER — PREDNISONE 10 MG/1
10 TABLET ORAL DAILY
Qty: 3 TABLET | Refills: 0 | Status: ON HOLD | DISCHARGE
Start: 2025-08-28

## 2025-08-19 RX ORDER — PREDNISONE 20 MG/1
20 TABLET ORAL DAILY
Qty: 3 TABLET | Refills: 0 | Status: ON HOLD | DISCHARGE
Start: 2025-08-25

## 2025-08-19 RX ORDER — CALCIUM POLYCARBOPHIL 625 MG/1
1 TABLET, FILM COATED ORAL DAILY
Qty: 30 TABLET | Refills: 0 | DISCHARGE
Start: 2025-08-19 | End: 2025-08-21

## 2025-08-19 RX ORDER — POTASSIUM CHLORIDE 1500 MG/1
40 TABLET, EXTENDED RELEASE ORAL ONCE
Status: COMPLETED | OUTPATIENT
Start: 2025-08-19 | End: 2025-08-19

## 2025-08-19 RX ORDER — WARFARIN SODIUM 10 MG/1
10 TABLET ORAL EVERY MORNING
Qty: 30 TABLET | Refills: 0 | Status: ON HOLD | DISCHARGE
Start: 2025-08-19 | End: 2025-08-23

## 2025-08-19 RX ORDER — FERROUS SULFATE 325(65) MG
325 TABLET ORAL
Qty: 30 TABLET | Refills: 0 | DISCHARGE
Start: 2025-08-19 | End: 2025-08-21

## 2025-08-19 RX ORDER — DULOXETIN HYDROCHLORIDE 60 MG/1
120 CAPSULE, DELAYED RELEASE ORAL DAILY
Qty: 60 CAPSULE | Refills: 0 | DISCHARGE
Start: 2025-08-19 | End: 2025-08-21

## 2025-08-19 RX ORDER — OXYBUTYNIN CHLORIDE 15 MG/1
30 TABLET, EXTENDED RELEASE ORAL DAILY
Qty: 60 TABLET | Refills: 0 | DISCHARGE
Start: 2025-08-19 | End: 2025-08-21

## 2025-08-19 RX ORDER — PREDNISONE 20 MG/1
20 TABLET ORAL DAILY
Qty: 3 TABLET | Refills: 0 | Status: CANCELLED | OUTPATIENT
Start: 2025-08-25

## 2025-08-19 RX ORDER — PREDNISONE 20 MG/1
40 TABLET ORAL DAILY
Qty: 2 TABLET | Refills: 0 | Status: CANCELLED | OUTPATIENT
Start: 2025-08-20

## 2025-08-19 RX ORDER — PREDNISONE 10 MG/1
30 TABLET ORAL DAILY
Qty: 9 TABLET | Refills: 0 | Status: CANCELLED | OUTPATIENT
Start: 2025-08-22

## 2025-08-19 RX ORDER — PRAZOSIN HYDROCHLORIDE 1 MG/1
3 CAPSULE ORAL AT BEDTIME
Qty: 90 CAPSULE | Refills: 0 | DISCHARGE
Start: 2025-08-19 | End: 2025-08-21

## 2025-08-19 RX ORDER — AMOXICILLIN 250 MG
1 CAPSULE ORAL 2 TIMES DAILY
Qty: 60 TABLET | Refills: 0 | DISCHARGE
Start: 2025-08-19 | End: 2025-08-21

## 2025-08-19 RX ORDER — OLANZAPINE 15 MG/1
15 TABLET, FILM COATED ORAL AT BEDTIME
Qty: 30 TABLET | Refills: 0 | DISCHARGE
Start: 2025-08-19 | End: 2025-08-21

## 2025-08-19 RX ORDER — KETOCONAZOLE 20 MG/G
CREAM TOPICAL 2 TIMES DAILY
Qty: 60 G | Refills: 0 | DISCHARGE
Start: 2025-08-19 | End: 2025-08-21

## 2025-08-19 RX ORDER — ACETAMINOPHEN 325 MG/1
650 TABLET ORAL EVERY 6 HOURS
Qty: 240 TABLET | Refills: 0 | Status: SHIPPED | OUTPATIENT
Start: 2025-08-19 | End: 2025-08-21

## 2025-08-19 RX ORDER — PREDNISONE 20 MG/1
40 TABLET ORAL DAILY
Qty: 1 TABLET | Refills: 0 | DISCHARGE
Start: 2025-08-20 | End: 2025-08-22

## 2025-08-19 RX ORDER — ALLOPURINOL 300 MG/1
300 TABLET ORAL DAILY
Qty: 30 TABLET | Refills: 0 | DISCHARGE
Start: 2025-08-19 | End: 2025-08-21

## 2025-08-19 RX ORDER — PANTOPRAZOLE SODIUM 40 MG/1
40 TABLET, DELAYED RELEASE ORAL
Qty: 30 TABLET | Refills: 0 | DISCHARGE
Start: 2025-08-19 | End: 2025-08-21

## 2025-08-19 RX ORDER — BISACODYL 10 MG
10 SUPPOSITORY, RECTAL RECTAL DAILY PRN
Qty: 30 SUPPOSITORY | Refills: 0 | DISCHARGE
Start: 2025-08-19 | End: 2025-08-21

## 2025-08-19 RX ORDER — PREDNISONE 10 MG/1
10 TABLET ORAL DAILY
Qty: 3 TABLET | Refills: 0 | Status: CANCELLED | OUTPATIENT
Start: 2025-08-28

## 2025-08-19 RX ORDER — WARFARIN SODIUM 7.5 MG/1
7.5 TABLET ORAL
Status: COMPLETED | OUTPATIENT
Start: 2025-08-19 | End: 2025-08-19

## 2025-08-19 RX ORDER — RIFAMPIN 300 MG/1
300 CAPSULE ORAL EVERY 12 HOURS
Qty: 84 CAPSULE | Refills: 0 | DISCHARGE
Start: 2025-08-19 | End: 2025-08-22

## 2025-08-19 RX ORDER — MIRABEGRON 50 MG/1
50 TABLET, FILM COATED, EXTENDED RELEASE ORAL DAILY
Qty: 30 TABLET | Refills: 0 | DISCHARGE
Start: 2025-08-19 | End: 2025-08-21

## 2025-08-19 RX ORDER — MAGNESIUM OXIDE 400 MG/1
200 TABLET ORAL DAILY
Qty: 15 TABLET | Refills: 0 | DISCHARGE
Start: 2025-08-19 | End: 2025-08-21

## 2025-08-19 RX ORDER — POLYETHYLENE GLYCOL 3350 17 G/17G
17 POWDER, FOR SOLUTION ORAL DAILY
Qty: 510 G | Refills: 0 | DISCHARGE
Start: 2025-08-19 | End: 2025-08-21

## 2025-08-19 RX ORDER — PREDNISONE 10 MG/1
30 TABLET ORAL DAILY
Qty: 9 TABLET | Refills: 0 | Status: ON HOLD | DISCHARGE
Start: 2025-08-22

## 2025-08-19 RX ORDER — FUROSEMIDE 40 MG/1
40 TABLET ORAL ONCE
Status: COMPLETED | OUTPATIENT
Start: 2025-08-19 | End: 2025-08-19

## 2025-08-19 RX ORDER — LOPERAMIDE HYDROCHLORIDE 2 MG/1
2 CAPSULE ORAL 4 TIMES DAILY PRN
Qty: 120 CAPSULE | Refills: 0 | DISCHARGE
Start: 2025-08-19 | End: 2025-08-21

## 2025-08-19 RX ADMIN — WARFARIN SODIUM 7.5 MG: 7.5 TABLET ORAL at 18:19

## 2025-08-19 RX ADMIN — IPRATROPIUM BROMIDE AND ALBUTEROL SULFATE 3 ML: .5; 3 SOLUTION RESPIRATORY (INHALATION) at 19:41

## 2025-08-19 RX ADMIN — RIFAMPIN 300 MG: 300 CAPSULE ORAL at 12:06

## 2025-08-19 RX ADMIN — IPRATROPIUM BROMIDE AND ALBUTEROL SULFATE 3 ML: .5; 3 SOLUTION RESPIRATORY (INHALATION) at 16:58

## 2025-08-19 RX ADMIN — Medication 3 ML: at 23:17

## 2025-08-19 RX ADMIN — PANTOPRAZOLE SODIUM 40 MG: 40 TABLET, DELAYED RELEASE ORAL at 08:36

## 2025-08-19 RX ADMIN — ALLOPURINOL 300 MG: 300 TABLET ORAL at 08:36

## 2025-08-19 RX ADMIN — POTASSIUM CHLORIDE 40 MEQ: 1500 TABLET, EXTENDED RELEASE ORAL at 08:36

## 2025-08-19 RX ADMIN — IPRATROPIUM BROMIDE AND ALBUTEROL SULFATE 3 ML: .5; 3 SOLUTION RESPIRATORY (INHALATION) at 08:01

## 2025-08-19 RX ADMIN — RIFAMPIN 300 MG: 300 CAPSULE ORAL at 23:00

## 2025-08-19 RX ADMIN — MIRABEGRON 50 MG: 25 TABLET, FILM COATED, EXTENDED RELEASE ORAL at 08:36

## 2025-08-19 RX ADMIN — Medication 1000 MG: at 03:41

## 2025-08-19 RX ADMIN — OLANZAPINE 15 MG: 15 TABLET, FILM COATED ORAL at 23:00

## 2025-08-19 RX ADMIN — IPRATROPIUM BROMIDE AND ALBUTEROL SULFATE 3 ML: .5; 3 SOLUTION RESPIRATORY (INHALATION) at 12:13

## 2025-08-19 RX ADMIN — FUROSEMIDE 40 MG: 40 TABLET ORAL at 12:06

## 2025-08-19 RX ADMIN — DULOXETINE 120 MG: 30 CAPSULE, DELAYED RELEASE ORAL at 08:36

## 2025-08-19 RX ADMIN — PREDNISONE 40 MG: 20 TABLET ORAL at 08:37

## 2025-08-19 RX ADMIN — PANTOPRAZOLE SODIUM 40 MG: 40 TABLET, DELAYED RELEASE ORAL at 16:33

## 2025-08-19 RX ADMIN — Medication 1000 MG: at 16:31

## 2025-08-19 RX ADMIN — Medication 200 MG: at 08:37

## 2025-08-19 RX ADMIN — OXYBUTYNIN CHLORIDE 30 MG: 10 TABLET, EXTENDED RELEASE ORAL at 08:36

## 2025-08-19 ASSESSMENT — ACTIVITIES OF DAILY LIVING (ADL)
ADLS_ACUITY_SCORE: 42

## 2025-08-20 ENCOUNTER — APPOINTMENT (OUTPATIENT)
Dept: OCCUPATIONAL THERAPY | Facility: CLINIC | Age: 45
DRG: 177 | End: 2025-08-20
Payer: COMMERCIAL

## 2025-08-20 ENCOUNTER — APPOINTMENT (OUTPATIENT)
Dept: PHYSICAL THERAPY | Facility: CLINIC | Age: 45
DRG: 177 | End: 2025-08-20
Attending: STUDENT IN AN ORGANIZED HEALTH CARE EDUCATION/TRAINING PROGRAM
Payer: COMMERCIAL

## 2025-08-20 LAB
ALBUMIN SERPL BCG-MCNC: 3 G/DL (ref 3.5–5.2)
ALP SERPL-CCNC: 63 U/L (ref 40–150)
ALT SERPL W P-5'-P-CCNC: 12 U/L (ref 0–70)
ANION GAP SERPL CALCULATED.3IONS-SCNC: 12 MMOL/L (ref 7–15)
AST SERPL W P-5'-P-CCNC: 14 U/L (ref 0–45)
BASOPHILS # BLD AUTO: 0.07 10E3/UL (ref 0–0.2)
BASOPHILS NFR BLD AUTO: 0.7 %
BILIRUB DIRECT SERPL-MCNC: <0.08 MG/DL (ref 0–0.3)
BILIRUB SERPL-MCNC: <0.2 MG/DL
BUN SERPL-MCNC: 16.5 MG/DL (ref 6–20)
CALCIUM SERPL-MCNC: 8.7 MG/DL (ref 8.8–10.4)
CHLORIDE SERPL-SCNC: 102 MMOL/L (ref 98–107)
CREAT SERPL-MCNC: 1.19 MG/DL (ref 0.67–1.17)
EGFRCR SERPLBLD CKD-EPI 2021: 78 ML/MIN/1.73M2
EOSINOPHIL # BLD AUTO: 0.47 10E3/UL (ref 0–0.7)
EOSINOPHIL NFR BLD AUTO: 4.5 %
ERYTHROCYTE [DISTWIDTH] IN BLOOD BY AUTOMATED COUNT: 14.6 % (ref 10–15)
GLUCOSE SERPL-MCNC: 168 MG/DL (ref 70–99)
HCO3 SERPL-SCNC: 27 MMOL/L (ref 22–29)
HCT VFR BLD AUTO: 25.9 % (ref 40–53)
HGB BLD-MCNC: 8.2 G/DL (ref 13.3–17.7)
IMM GRANULOCYTES # BLD: 0.08 10E3/UL
IMM GRANULOCYTES NFR BLD: 0.8 %
INR PPP: 1.82 (ref 0.85–1.15)
LYMPHOCYTES # BLD AUTO: 2.19 10E3/UL (ref 0.8–5.3)
LYMPHOCYTES NFR BLD AUTO: 20.9 %
MAGNESIUM SERPL-MCNC: 1.9 MG/DL (ref 1.7–2.3)
MCH RBC QN AUTO: 27.7 PG (ref 26.5–33)
MCHC RBC AUTO-ENTMCNC: 31.7 G/DL (ref 31.5–36.5)
MCV RBC AUTO: 87.5 FL (ref 78–100)
MONOCYTES # BLD AUTO: 0.56 10E3/UL (ref 0–1.3)
MONOCYTES NFR BLD AUTO: 5.3 %
NEUTROPHILS # BLD AUTO: 7.11 10E3/UL (ref 1.6–8.3)
NEUTROPHILS NFR BLD AUTO: 67.8 %
NRBC # BLD AUTO: <0.03 10E3/UL
NRBC BLD AUTO-RTO: 0 /100
PLATELET # BLD AUTO: 346 10E3/UL (ref 150–450)
POTASSIUM SERPL-SCNC: 3.2 MMOL/L (ref 3.4–5.3)
POTASSIUM SERPL-SCNC: 3.9 MMOL/L (ref 3.4–5.3)
PROT SERPL-MCNC: 6.7 G/DL (ref 6.4–8.3)
PROTHROMBIN TIME: 21.5 SECONDS (ref 11.8–14.8)
RBC # BLD AUTO: 2.96 10E6/UL (ref 4.4–5.9)
SODIUM SERPL-SCNC: 141 MMOL/L (ref 135–145)
VANCOMYCIN SERPL-MCNC: 12.9 UG/ML (ref ?–25)
WBC # BLD AUTO: 10.48 10E3/UL (ref 4–11)

## 2025-08-20 PROCEDURE — 97535 SELF CARE MNGMENT TRAINING: CPT | Mod: GO

## 2025-08-20 PROCEDURE — 250N000013 HC RX MED GY IP 250 OP 250 PS 637

## 2025-08-20 PROCEDURE — 250N000011 HC RX IP 250 OP 636: Performed by: FAMILY MEDICINE

## 2025-08-20 PROCEDURE — 97530 THERAPEUTIC ACTIVITIES: CPT | Mod: GP | Performed by: PHYSICAL THERAPIST

## 2025-08-20 PROCEDURE — 250N000013 HC RX MED GY IP 250 OP 250 PS 637: Performed by: STUDENT IN AN ORGANIZED HEALTH CARE EDUCATION/TRAINING PROGRAM

## 2025-08-20 PROCEDURE — 80202 ASSAY OF VANCOMYCIN: CPT | Performed by: FAMILY MEDICINE

## 2025-08-20 PROCEDURE — 250N000012 HC RX MED GY IP 250 OP 636 PS 637

## 2025-08-20 PROCEDURE — 999N000157 HC STATISTIC RCP TIME EA 10 MIN

## 2025-08-20 PROCEDURE — 120N000002 HC R&B MED SURG/OB UMMC

## 2025-08-20 PROCEDURE — 85004 AUTOMATED DIFF WBC COUNT: CPT

## 2025-08-20 PROCEDURE — 99232 SBSQ HOSP IP/OBS MODERATE 35: CPT | Mod: GC

## 2025-08-20 PROCEDURE — 82248 BILIRUBIN DIRECT: CPT

## 2025-08-20 PROCEDURE — 94640 AIRWAY INHALATION TREATMENT: CPT | Mod: 76

## 2025-08-20 PROCEDURE — 97165 OT EVAL LOW COMPLEX 30 MIN: CPT | Mod: GO

## 2025-08-20 PROCEDURE — 250N000009 HC RX 250

## 2025-08-20 PROCEDURE — 85610 PROTHROMBIN TIME: CPT

## 2025-08-20 PROCEDURE — 84132 ASSAY OF SERUM POTASSIUM: CPT

## 2025-08-20 PROCEDURE — 94640 AIRWAY INHALATION TREATMENT: CPT

## 2025-08-20 PROCEDURE — 83735 ASSAY OF MAGNESIUM: CPT

## 2025-08-20 PROCEDURE — 36415 COLL VENOUS BLD VENIPUNCTURE: CPT | Performed by: FAMILY MEDICINE

## 2025-08-20 PROCEDURE — 94660 CPAP INITIATION&MGMT: CPT

## 2025-08-20 PROCEDURE — 99232 SBSQ HOSP IP/OBS MODERATE 35: CPT | Performed by: INTERNAL MEDICINE

## 2025-08-20 RX ORDER — PRENATAL VIT 91/IRON/FOLIC/DHA 28-975-200
COMBINATION PACKAGE (EA) ORAL 2 TIMES DAILY
Status: DISCONTINUED | OUTPATIENT
Start: 2025-08-20 | End: 2025-08-22 | Stop reason: HOSPADM

## 2025-08-20 RX ORDER — POTASSIUM CHLORIDE 1500 MG/1
40 TABLET, EXTENDED RELEASE ORAL ONCE
Status: COMPLETED | OUTPATIENT
Start: 2025-08-20 | End: 2025-08-20

## 2025-08-20 RX ORDER — POTASSIUM CHLORIDE 1.5 G/1.58G
20 POWDER, FOR SOLUTION ORAL DAILY
Status: DISCONTINUED | OUTPATIENT
Start: 2025-08-21 | End: 2025-08-22 | Stop reason: HOSPADM

## 2025-08-20 RX ORDER — WARFARIN SODIUM 10 MG/1
10 TABLET ORAL
Status: COMPLETED | OUTPATIENT
Start: 2025-08-20 | End: 2025-08-20

## 2025-08-20 RX ADMIN — ALLOPURINOL 300 MG: 300 TABLET ORAL at 10:24

## 2025-08-20 RX ADMIN — PREDNISONE 40 MG: 20 TABLET ORAL at 10:24

## 2025-08-20 RX ADMIN — IPRATROPIUM BROMIDE AND ALBUTEROL SULFATE 3 ML: .5; 3 SOLUTION RESPIRATORY (INHALATION) at 12:22

## 2025-08-20 RX ADMIN — KETOCONAZOLE CREAM, 2%: 20 CREAM TOPICAL at 22:10

## 2025-08-20 RX ADMIN — TERBINAFINE HYDROCHLORIDE: 1 CREAM TOPICAL at 15:21

## 2025-08-20 RX ADMIN — Medication 200 MG: at 10:24

## 2025-08-20 RX ADMIN — POTASSIUM CHLORIDE 40 MEQ: 1500 TABLET, EXTENDED RELEASE ORAL at 10:24

## 2025-08-20 RX ADMIN — IPRATROPIUM BROMIDE AND ALBUTEROL SULFATE 3 ML: .5; 3 SOLUTION RESPIRATORY (INHALATION) at 16:39

## 2025-08-20 RX ADMIN — OXYBUTYNIN CHLORIDE 30 MG: 10 TABLET, EXTENDED RELEASE ORAL at 10:24

## 2025-08-20 RX ADMIN — SENNOSIDES, DOCUSATE SODIUM 2 TABLET: 50; 8.6 TABLET, FILM COATED ORAL at 10:24

## 2025-08-20 RX ADMIN — OLANZAPINE 15 MG: 15 TABLET, FILM COATED ORAL at 22:10

## 2025-08-20 RX ADMIN — MIRABEGRON 50 MG: 25 TABLET, FILM COATED, EXTENDED RELEASE ORAL at 10:24

## 2025-08-20 RX ADMIN — RIFAMPIN 300 MG: 300 CAPSULE ORAL at 22:14

## 2025-08-20 RX ADMIN — PANTOPRAZOLE SODIUM 40 MG: 40 TABLET, DELAYED RELEASE ORAL at 15:30

## 2025-08-20 RX ADMIN — Medication 1000 MG: at 08:43

## 2025-08-20 RX ADMIN — Medication 1000 MG: at 18:15

## 2025-08-20 RX ADMIN — TERBINAFINE HYDROCHLORIDE: 1 CREAM TOPICAL at 20:29

## 2025-08-20 RX ADMIN — SENNOSIDES, DOCUSATE SODIUM 2 TABLET: 50; 8.6 TABLET, FILM COATED ORAL at 20:29

## 2025-08-20 RX ADMIN — DULOXETINE 120 MG: 30 CAPSULE, DELAYED RELEASE ORAL at 10:24

## 2025-08-20 RX ADMIN — RIFAMPIN 300 MG: 300 CAPSULE ORAL at 10:24

## 2025-08-20 RX ADMIN — WARFARIN SODIUM 10 MG: 10 TABLET ORAL at 18:14

## 2025-08-20 RX ADMIN — KETOCONAZOLE CREAM, 2%: 20 CREAM TOPICAL at 10:25

## 2025-08-20 RX ADMIN — IPRATROPIUM BROMIDE AND ALBUTEROL SULFATE 3 ML: .5; 3 SOLUTION RESPIRATORY (INHALATION) at 20:35

## 2025-08-20 RX ADMIN — IPRATROPIUM BROMIDE AND ALBUTEROL SULFATE 3 ML: .5; 3 SOLUTION RESPIRATORY (INHALATION) at 08:46

## 2025-08-20 RX ADMIN — PANTOPRAZOLE SODIUM 40 MG: 40 TABLET, DELAYED RELEASE ORAL at 10:24

## 2025-08-20 ASSESSMENT — ACTIVITIES OF DAILY LIVING (ADL)
ADLS_ACUITY_SCORE: 42

## 2025-08-21 ENCOUNTER — APPOINTMENT (OUTPATIENT)
Dept: GENERAL RADIOLOGY | Facility: CLINIC | Age: 45
DRG: 177 | End: 2025-08-21
Attending: INTERNAL MEDICINE
Payer: COMMERCIAL

## 2025-08-21 VITALS
TEMPERATURE: 98.8 F | SYSTOLIC BLOOD PRESSURE: 133 MMHG | HEIGHT: 72 IN | OXYGEN SATURATION: 95 % | WEIGHT: 315 LBS | DIASTOLIC BLOOD PRESSURE: 81 MMHG | HEART RATE: 88 BPM | BODY MASS INDEX: 42.66 KG/M2 | RESPIRATION RATE: 14 BRPM

## 2025-08-21 LAB
ANION GAP SERPL CALCULATED.3IONS-SCNC: 10 MMOL/L (ref 7–15)
BASOPHILS # BLD AUTO: 0.07 10E3/UL (ref 0–0.2)
BASOPHILS NFR BLD AUTO: 0.6 %
BUN SERPL-MCNC: 16.9 MG/DL (ref 6–20)
CALCIUM SERPL-MCNC: 8.5 MG/DL (ref 8.8–10.4)
CHLORIDE SERPL-SCNC: 106 MMOL/L (ref 98–107)
CREAT SERPL-MCNC: 1.08 MG/DL (ref 0.67–1.17)
EGFRCR SERPLBLD CKD-EPI 2021: 87 ML/MIN/1.73M2
EOSINOPHIL # BLD AUTO: 0.29 10E3/UL (ref 0–0.7)
EOSINOPHIL NFR BLD AUTO: 2.6 %
ERYTHROCYTE [DISTWIDTH] IN BLOOD BY AUTOMATED COUNT: 14.9 % (ref 10–15)
GLUCOSE SERPL-MCNC: 119 MG/DL (ref 70–99)
HCO3 SERPL-SCNC: 26 MMOL/L (ref 22–29)
HCT VFR BLD AUTO: 26.3 % (ref 40–53)
HGB BLD-MCNC: 8.3 G/DL (ref 13.3–17.7)
IMM GRANULOCYTES # BLD: 0.09 10E3/UL
IMM GRANULOCYTES NFR BLD: 0.8 %
INR PPP: 1.75 (ref 0.85–1.15)
LYMPHOCYTES # BLD AUTO: 2.67 10E3/UL (ref 0.8–5.3)
LYMPHOCYTES NFR BLD AUTO: 24.3 %
MAGNESIUM SERPL-MCNC: 1.9 MG/DL (ref 1.7–2.3)
MAGNESIUM SERPL-MCNC: 2 MG/DL (ref 1.7–2.3)
MCH RBC QN AUTO: 27.5 PG (ref 26.5–33)
MCHC RBC AUTO-ENTMCNC: 31.6 G/DL (ref 31.5–36.5)
MCV RBC AUTO: 87.1 FL (ref 78–100)
MONOCYTES # BLD AUTO: 0.66 10E3/UL (ref 0–1.3)
MONOCYTES NFR BLD AUTO: 6 %
NEUTROPHILS # BLD AUTO: 7.2 10E3/UL (ref 1.6–8.3)
NEUTROPHILS NFR BLD AUTO: 65.7 %
NRBC # BLD AUTO: <0.03 10E3/UL
NRBC BLD AUTO-RTO: 0 /100
PLATELET # BLD AUTO: 343 10E3/UL (ref 150–450)
POTASSIUM SERPL-SCNC: 3.5 MMOL/L (ref 3.4–5.3)
PROTHROMBIN TIME: 20.8 SECONDS (ref 11.8–14.8)
RBC # BLD AUTO: 3.02 10E6/UL (ref 4.4–5.9)
SODIUM SERPL-SCNC: 142 MMOL/L (ref 135–145)
WBC # BLD AUTO: 10.98 10E3/UL (ref 4–11)

## 2025-08-21 PROCEDURE — 250N000013 HC RX MED GY IP 250 OP 250 PS 637: Performed by: FAMILY MEDICINE

## 2025-08-21 PROCEDURE — 99231 SBSQ HOSP IP/OBS SF/LOW 25: CPT | Mod: GC

## 2025-08-21 PROCEDURE — 85610 PROTHROMBIN TIME: CPT

## 2025-08-21 PROCEDURE — 83735 ASSAY OF MAGNESIUM: CPT

## 2025-08-21 PROCEDURE — 250N000012 HC RX MED GY IP 250 OP 636 PS 637

## 2025-08-21 PROCEDURE — 85004 AUTOMATED DIFF WBC COUNT: CPT

## 2025-08-21 PROCEDURE — 250N000013 HC RX MED GY IP 250 OP 250 PS 637

## 2025-08-21 PROCEDURE — 71045 X-RAY EXAM CHEST 1 VIEW: CPT | Mod: 26 | Performed by: RADIOLOGY

## 2025-08-21 PROCEDURE — 80048 BASIC METABOLIC PNL TOTAL CA: CPT

## 2025-08-21 PROCEDURE — 250N000011 HC RX IP 250 OP 636: Performed by: FAMILY MEDICINE

## 2025-08-21 PROCEDURE — 94660 CPAP INITIATION&MGMT: CPT

## 2025-08-21 PROCEDURE — 120N000002 HC R&B MED SURG/OB UMMC

## 2025-08-21 PROCEDURE — 250N000009 HC RX 250

## 2025-08-21 PROCEDURE — 250N000011 HC RX IP 250 OP 636

## 2025-08-21 PROCEDURE — 71045 X-RAY EXAM CHEST 1 VIEW: CPT

## 2025-08-21 PROCEDURE — 36415 COLL VENOUS BLD VENIPUNCTURE: CPT

## 2025-08-21 PROCEDURE — 999N000157 HC STATISTIC RCP TIME EA 10 MIN

## 2025-08-21 PROCEDURE — 94640 AIRWAY INHALATION TREATMENT: CPT

## 2025-08-21 PROCEDURE — 94640 AIRWAY INHALATION TREATMENT: CPT | Mod: 76

## 2025-08-21 RX ORDER — BISACODYL 10 MG
10 SUPPOSITORY, RECTAL RECTAL DAILY PRN
Status: CANCELLED | DISCHARGE
Start: 2025-08-21

## 2025-08-21 RX ORDER — ACETAMINOPHEN 325 MG/1
650 TABLET ORAL EVERY 6 HOURS
Status: CANCELLED | DISCHARGE
Start: 2025-08-21

## 2025-08-21 RX ORDER — ALLOPURINOL 300 MG/1
300 TABLET ORAL DAILY
Status: CANCELLED | DISCHARGE
Start: 2025-08-21

## 2025-08-21 RX ORDER — FERROUS SULFATE 325(65) MG
325 TABLET ORAL
Status: CANCELLED | DISCHARGE
Start: 2025-08-21

## 2025-08-21 RX ORDER — DULOXETIN HYDROCHLORIDE 60 MG/1
120 CAPSULE, DELAYED RELEASE ORAL DAILY
Status: CANCELLED | DISCHARGE
Start: 2025-08-21

## 2025-08-21 RX ADMIN — IPRATROPIUM BROMIDE AND ALBUTEROL SULFATE 3 ML: .5; 3 SOLUTION RESPIRATORY (INHALATION) at 20:10

## 2025-08-21 RX ADMIN — Medication 1000 MG: at 06:47

## 2025-08-21 RX ADMIN — TERBINAFINE HYDROCHLORIDE: 1 CREAM TOPICAL at 08:41

## 2025-08-21 RX ADMIN — RIFAMPIN 300 MG: 300 CAPSULE ORAL at 11:07

## 2025-08-21 RX ADMIN — KETOCONAZOLE CREAM, 2%: 20 CREAM TOPICAL at 19:09

## 2025-08-21 RX ADMIN — PANTOPRAZOLE SODIUM 40 MG: 40 TABLET, DELAYED RELEASE ORAL at 15:32

## 2025-08-21 RX ADMIN — PANTOPRAZOLE SODIUM 40 MG: 40 TABLET, DELAYED RELEASE ORAL at 06:47

## 2025-08-21 RX ADMIN — MIRABEGRON 50 MG: 25 TABLET, FILM COATED, EXTENDED RELEASE ORAL at 08:40

## 2025-08-21 RX ADMIN — TERBINAFINE HYDROCHLORIDE: 1 CREAM TOPICAL at 19:09

## 2025-08-21 RX ADMIN — POLYETHYLENE GLYCOL 3350 17 G: 17 POWDER, FOR SOLUTION ORAL at 08:41

## 2025-08-21 RX ADMIN — RIFAMPIN 300 MG: 300 CAPSULE ORAL at 21:49

## 2025-08-21 RX ADMIN — WARFARIN SODIUM 12.5 MG: 10 TABLET ORAL at 19:09

## 2025-08-21 RX ADMIN — DULOXETINE 120 MG: 30 CAPSULE, DELAYED RELEASE ORAL at 08:40

## 2025-08-21 RX ADMIN — Medication 200 MG: at 08:40

## 2025-08-21 RX ADMIN — IPRATROPIUM BROMIDE AND ALBUTEROL SULFATE 3 ML: .5; 3 SOLUTION RESPIRATORY (INHALATION) at 08:26

## 2025-08-21 RX ADMIN — Medication 3 ML: at 09:12

## 2025-08-21 RX ADMIN — IPRATROPIUM BROMIDE AND ALBUTEROL SULFATE 3 ML: .5; 3 SOLUTION RESPIRATORY (INHALATION) at 13:12

## 2025-08-21 RX ADMIN — KETOCONAZOLE CREAM, 2%: 20 CREAM TOPICAL at 08:41

## 2025-08-21 RX ADMIN — POTASSIUM CHLORIDE 20 MEQ: 1.5 POWDER, FOR SOLUTION ORAL at 08:40

## 2025-08-21 RX ADMIN — Medication 1000 MG: at 19:09

## 2025-08-21 RX ADMIN — IPRATROPIUM BROMIDE AND ALBUTEROL SULFATE 3 ML: .5; 3 SOLUTION RESPIRATORY (INHALATION) at 16:42

## 2025-08-21 RX ADMIN — SENNOSIDES, DOCUSATE SODIUM 2 TABLET: 50; 8.6 TABLET, FILM COATED ORAL at 19:09

## 2025-08-21 RX ADMIN — PREDNISONE 40 MG: 20 TABLET ORAL at 08:40

## 2025-08-21 RX ADMIN — ALLOPURINOL 300 MG: 300 TABLET ORAL at 08:41

## 2025-08-21 RX ADMIN — SENNOSIDES, DOCUSATE SODIUM 2 TABLET: 50; 8.6 TABLET, FILM COATED ORAL at 08:40

## 2025-08-21 RX ADMIN — OLANZAPINE 15 MG: 15 TABLET, FILM COATED ORAL at 21:49

## 2025-08-21 RX ADMIN — OXYBUTYNIN CHLORIDE 30 MG: 10 TABLET, EXTENDED RELEASE ORAL at 08:41

## 2025-08-21 ASSESSMENT — ACTIVITIES OF DAILY LIVING (ADL)
ADLS_ACUITY_SCORE: 42

## 2025-08-22 ENCOUNTER — APPOINTMENT (OUTPATIENT)
Dept: PHYSICAL THERAPY | Facility: CLINIC | Age: 45
DRG: 177 | End: 2025-08-22
Attending: STUDENT IN AN ORGANIZED HEALTH CARE EDUCATION/TRAINING PROGRAM
Payer: COMMERCIAL

## 2025-08-22 ENCOUNTER — HOSPITAL ENCOUNTER (INPATIENT)
Facility: SKILLED NURSING FACILITY | Age: 45
DRG: 197 | End: 2025-08-22
Attending: INTERNAL MEDICINE | Admitting: INTERNAL MEDICINE
Payer: COMMERCIAL

## 2025-08-22 VITALS
HEART RATE: 67 BPM | WEIGHT: 315 LBS | RESPIRATION RATE: 18 BRPM | TEMPERATURE: 98 F | HEIGHT: 72 IN | SYSTOLIC BLOOD PRESSURE: 124 MMHG | DIASTOLIC BLOOD PRESSURE: 57 MMHG | BODY MASS INDEX: 42.66 KG/M2 | OXYGEN SATURATION: 98 %

## 2025-08-22 PROBLEM — J98.4 PNEUMONITIS: Status: ACTIVE | Noted: 2025-08-22

## 2025-08-22 LAB
ANION GAP SERPL CALCULATED.3IONS-SCNC: 8 MMOL/L (ref 7–15)
BASOPHILS # BLD AUTO: 0.07 10E3/UL (ref 0–0.2)
BASOPHILS NFR BLD AUTO: 0.6 %
BUN SERPL-MCNC: 16.8 MG/DL (ref 6–20)
CALCIUM SERPL-MCNC: 8.8 MG/DL (ref 8.8–10.4)
CHLORIDE SERPL-SCNC: 107 MMOL/L (ref 98–107)
CREAT SERPL-MCNC: 1.1 MG/DL (ref 0.67–1.17)
EGFRCR SERPLBLD CKD-EPI 2021: 85 ML/MIN/1.73M2
EOSINOPHIL # BLD AUTO: 0.24 10E3/UL (ref 0–0.7)
EOSINOPHIL NFR BLD AUTO: 2.2 %
ERYTHROCYTE [DISTWIDTH] IN BLOOD BY AUTOMATED COUNT: 15 % (ref 10–15)
GLUCOSE SERPL-MCNC: 141 MG/DL (ref 70–99)
HCO3 SERPL-SCNC: 28 MMOL/L (ref 22–29)
HCT VFR BLD AUTO: 26.3 % (ref 40–53)
HGB BLD-MCNC: 8.6 G/DL (ref 13.3–17.7)
IMM GRANULOCYTES # BLD: 0.09 10E3/UL
IMM GRANULOCYTES NFR BLD: 0.8 %
INR PPP: 1.65 (ref 0.85–1.15)
LYMPHOCYTES # BLD AUTO: 2.76 10E3/UL (ref 0.8–5.3)
LYMPHOCYTES NFR BLD AUTO: 25.5 %
MAGNESIUM SERPL-MCNC: 2 MG/DL (ref 1.7–2.3)
MCH RBC QN AUTO: 28.2 PG (ref 26.5–33)
MCHC RBC AUTO-ENTMCNC: 32.7 G/DL (ref 31.5–36.5)
MCV RBC AUTO: 86.2 FL (ref 78–100)
MONOCYTES # BLD AUTO: 0.67 10E3/UL (ref 0–1.3)
MONOCYTES NFR BLD AUTO: 6.2 %
NEUTROPHILS # BLD AUTO: 6.98 10E3/UL (ref 1.6–8.3)
NEUTROPHILS NFR BLD AUTO: 64.7 %
NRBC # BLD AUTO: <0.03 10E3/UL
NRBC BLD AUTO-RTO: 0 /100
PLATELET # BLD AUTO: 367 10E3/UL (ref 150–450)
POTASSIUM SERPL-SCNC: 3.5 MMOL/L (ref 3.4–5.3)
PROTHROMBIN TIME: 20 SECONDS (ref 11.8–14.8)
RBC # BLD AUTO: 3.05 10E6/UL (ref 4.4–5.9)
SODIUM SERPL-SCNC: 143 MMOL/L (ref 135–145)
VANCOMYCIN SERPL-MCNC: 13.5 UG/ML (ref ?–25)
WBC # BLD AUTO: 10.81 10E3/UL (ref 4–11)

## 2025-08-22 PROCEDURE — 999N000157 HC STATISTIC RCP TIME EA 10 MIN

## 2025-08-22 PROCEDURE — 80202 ASSAY OF VANCOMYCIN: CPT | Performed by: FAMILY MEDICINE

## 2025-08-22 PROCEDURE — 94640 AIRWAY INHALATION TREATMENT: CPT

## 2025-08-22 PROCEDURE — 250N000009 HC RX 250

## 2025-08-22 PROCEDURE — 97530 THERAPEUTIC ACTIVITIES: CPT | Mod: GP | Performed by: PHYSICAL THERAPIST

## 2025-08-22 PROCEDURE — 94660 CPAP INITIATION&MGMT: CPT

## 2025-08-22 PROCEDURE — 80048 BASIC METABOLIC PNL TOTAL CA: CPT

## 2025-08-22 PROCEDURE — 94640 AIRWAY INHALATION TREATMENT: CPT | Mod: 76

## 2025-08-22 PROCEDURE — 250N000013 HC RX MED GY IP 250 OP 250 PS 637

## 2025-08-22 PROCEDURE — 250N000011 HC RX IP 250 OP 636: Performed by: FAMILY MEDICINE

## 2025-08-22 PROCEDURE — 85025 COMPLETE CBC W/AUTO DIFF WBC: CPT

## 2025-08-22 PROCEDURE — 250N000012 HC RX MED GY IP 250 OP 636 PS 637

## 2025-08-22 PROCEDURE — 36415 COLL VENOUS BLD VENIPUNCTURE: CPT

## 2025-08-22 PROCEDURE — 83735 ASSAY OF MAGNESIUM: CPT

## 2025-08-22 PROCEDURE — 99239 HOSP IP/OBS DSCHRG MGMT >30: CPT | Mod: GC

## 2025-08-22 PROCEDURE — 85610 PROTHROMBIN TIME: CPT

## 2025-08-22 RX ORDER — MIRABEGRON 50 MG/1
50 TABLET, FILM COATED, EXTENDED RELEASE ORAL DAILY
Status: ON HOLD | DISCHARGE
Start: 2025-08-22

## 2025-08-22 RX ORDER — FERROUS SULFATE 325(65) MG
325 TABLET ORAL
Status: ON HOLD | DISCHARGE
Start: 2025-08-22

## 2025-08-22 RX ORDER — LOPERAMIDE HYDROCHLORIDE 2 MG/1
2 CAPSULE ORAL 4 TIMES DAILY PRN
Status: ON HOLD | DISCHARGE
Start: 2025-08-22

## 2025-08-22 RX ORDER — PREDNISONE 10 MG/1
30 TABLET ORAL DAILY
Qty: 15 TABLET | Refills: 0 | Status: CANCELLED | OUTPATIENT
Start: 2025-08-24 | End: 2025-08-29

## 2025-08-22 RX ORDER — PRAZOSIN HYDROCHLORIDE 1 MG/1
3 CAPSULE ORAL AT BEDTIME
Status: ON HOLD | DISCHARGE
Start: 2025-08-22

## 2025-08-22 RX ORDER — RIFAMPIN 300 MG/1
300 CAPSULE ORAL EVERY 12 HOURS
Status: CANCELLED | DISCHARGE
Start: 2025-08-22

## 2025-08-22 RX ORDER — PREDNISONE 20 MG/1
20 TABLET ORAL DAILY
Qty: 5 TABLET | Refills: 0 | Status: CANCELLED | OUTPATIENT
Start: 2025-08-30 | End: 2025-09-04

## 2025-08-22 RX ORDER — PRENATAL VIT 91/IRON/FOLIC/DHA 28-975-200
COMBINATION PACKAGE (EA) ORAL 2 TIMES DAILY
Qty: 42 G | Refills: 0 | Status: ON HOLD | OUTPATIENT
Start: 2025-08-22

## 2025-08-22 RX ORDER — PREDNISONE 20 MG/1
40 TABLET ORAL DAILY
Status: DISCONTINUED | OUTPATIENT
Start: 2025-08-22 | End: 2025-08-22 | Stop reason: HOSPADM

## 2025-08-22 RX ORDER — AMOXICILLIN 250 MG
1 CAPSULE ORAL 2 TIMES DAILY
Status: ON HOLD | DISCHARGE
Start: 2025-08-22

## 2025-08-22 RX ORDER — PANTOPRAZOLE SODIUM 40 MG/1
40 TABLET, DELAYED RELEASE ORAL
Status: ON HOLD | DISCHARGE
Start: 2025-08-22

## 2025-08-22 RX ORDER — DULOXETIN HYDROCHLORIDE 60 MG/1
120 CAPSULE, DELAYED RELEASE ORAL DAILY
Status: ON HOLD | DISCHARGE
Start: 2025-08-22

## 2025-08-22 RX ORDER — PREDNISONE 20 MG/1
20 TABLET ORAL DAILY
Status: DISCONTINUED | OUTPATIENT
Start: 2025-08-29 | End: 2025-08-22 | Stop reason: HOSPADM

## 2025-08-22 RX ORDER — CALCIUM POLYCARBOPHIL 625 MG/1
1 TABLET, FILM COATED ORAL DAILY
Status: ON HOLD | DISCHARGE
Start: 2025-08-22

## 2025-08-22 RX ORDER — ALLOPURINOL 300 MG/1
300 TABLET ORAL DAILY
Status: ON HOLD | DISCHARGE
Start: 2025-08-22

## 2025-08-22 RX ORDER — ACETAMINOPHEN 325 MG/1
650 TABLET ORAL EVERY 6 HOURS
Status: ON HOLD | DISCHARGE
Start: 2025-08-22

## 2025-08-22 RX ORDER — PREDNISONE 10 MG/1
10 TABLET ORAL DAILY
Qty: 5 TABLET | Refills: 0 | Status: CANCELLED | OUTPATIENT
Start: 2025-09-05 | End: 2025-09-10

## 2025-08-22 RX ORDER — RIFAMPIN 300 MG/1
300 CAPSULE ORAL EVERY 12 HOURS
Status: ON HOLD | DISCHARGE
Start: 2025-08-22 | End: 2025-09-01

## 2025-08-22 RX ORDER — BISACODYL 10 MG
10 SUPPOSITORY, RECTAL RECTAL DAILY PRN
Status: ON HOLD | DISCHARGE
Start: 2025-08-22

## 2025-08-22 RX ORDER — KETOCONAZOLE 20 MG/ML
SHAMPOO, SUSPENSION TOPICAL DAILY PRN
Status: ON HOLD | DISCHARGE
Start: 2025-08-22

## 2025-08-22 RX ORDER — PREDNISONE 20 MG/1
40 TABLET ORAL DAILY
Qty: 2 TABLET | Refills: 0 | Status: CANCELLED | OUTPATIENT
Start: 2025-08-23 | End: 2025-08-24

## 2025-08-22 RX ORDER — KETOCONAZOLE 20 MG/G
CREAM TOPICAL 2 TIMES DAILY
Status: ON HOLD | DISCHARGE
Start: 2025-08-22

## 2025-08-22 RX ORDER — PREDNISONE 10 MG/1
10 TABLET ORAL DAILY
Status: DISCONTINUED | OUTPATIENT
Start: 2025-09-03 | End: 2025-08-22 | Stop reason: HOSPADM

## 2025-08-22 RX ORDER — OXYBUTYNIN CHLORIDE 15 MG/1
30 TABLET, EXTENDED RELEASE ORAL DAILY
Status: ON HOLD | DISCHARGE
Start: 2025-08-22

## 2025-08-22 RX ORDER — MAGNESIUM OXIDE 400 MG/1
200 TABLET ORAL DAILY
Status: ON HOLD | DISCHARGE
Start: 2025-08-22

## 2025-08-22 RX ORDER — WARFARIN SODIUM 7.5 MG/1
15 TABLET ORAL EVERY EVENING
Status: ON HOLD | DISCHARGE
Start: 2025-08-22 | End: 2025-11-04

## 2025-08-22 RX ORDER — WARFARIN SODIUM 7.5 MG/1
15 TABLET ORAL
Status: DISCONTINUED | OUTPATIENT
Start: 2025-08-22 | End: 2025-08-22 | Stop reason: HOSPADM

## 2025-08-22 RX ORDER — POLYETHYLENE GLYCOL 3350 17 G/17G
17 POWDER, FOR SOLUTION ORAL DAILY
Status: ON HOLD | DISCHARGE
Start: 2025-08-22

## 2025-08-22 RX ORDER — RIFAMPIN 300 MG/1
300 CAPSULE ORAL EVERY 12 HOURS
Status: CANCELLED | DISCHARGE
Start: 2025-08-22 | End: 2025-09-01

## 2025-08-22 RX ORDER — PREDNISONE 20 MG/1
TABLET ORAL
Status: ON HOLD | DISCHARGE
Start: 2025-08-23 | End: 2025-09-08

## 2025-08-22 RX ORDER — OLANZAPINE 15 MG/1
15 TABLET, FILM COATED ORAL AT BEDTIME
Status: ON HOLD | DISCHARGE
Start: 2025-08-22

## 2025-08-22 RX ADMIN — TERBINAFINE HYDROCHLORIDE: 1 CREAM TOPICAL at 10:18

## 2025-08-22 RX ADMIN — POTASSIUM CHLORIDE 20 MEQ: 1.5 POWDER, FOR SOLUTION ORAL at 08:46

## 2025-08-22 RX ADMIN — Medication 200 MG: at 08:46

## 2025-08-22 RX ADMIN — RIFAMPIN 300 MG: 300 CAPSULE ORAL at 10:09

## 2025-08-22 RX ADMIN — IPRATROPIUM BROMIDE AND ALBUTEROL SULFATE 3 ML: .5; 3 SOLUTION RESPIRATORY (INHALATION) at 15:55

## 2025-08-22 RX ADMIN — MIRABEGRON 50 MG: 25 TABLET, FILM COATED, EXTENDED RELEASE ORAL at 08:47

## 2025-08-22 RX ADMIN — PANTOPRAZOLE SODIUM 40 MG: 40 TABLET, DELAYED RELEASE ORAL at 08:46

## 2025-08-22 RX ADMIN — SENNOSIDES AND DOCUSATE SODIUM 1 TABLET: 50; 8.6 TABLET ORAL at 08:45

## 2025-08-22 RX ADMIN — IPRATROPIUM BROMIDE AND ALBUTEROL SULFATE 3 ML: .5; 3 SOLUTION RESPIRATORY (INHALATION) at 12:49

## 2025-08-22 RX ADMIN — POLYETHYLENE GLYCOL 3350 17 G: 17 POWDER, FOR SOLUTION ORAL at 08:45

## 2025-08-22 RX ADMIN — PREDNISONE 40 MG: 20 TABLET ORAL at 08:46

## 2025-08-22 RX ADMIN — ALLOPURINOL 300 MG: 300 TABLET ORAL at 08:47

## 2025-08-22 RX ADMIN — DULOXETINE 120 MG: 30 CAPSULE, DELAYED RELEASE ORAL at 08:45

## 2025-08-22 RX ADMIN — IPRATROPIUM BROMIDE AND ALBUTEROL SULFATE 3 ML: .5; 3 SOLUTION RESPIRATORY (INHALATION) at 09:08

## 2025-08-22 RX ADMIN — OXYBUTYNIN CHLORIDE 30 MG: 10 TABLET, EXTENDED RELEASE ORAL at 08:44

## 2025-08-22 RX ADMIN — Medication 1000 MG: at 06:44

## 2025-08-22 ASSESSMENT — ACTIVITIES OF DAILY LIVING (ADL)
ADLS_ACUITY_SCORE: 39
ADLS_ACUITY_SCORE: 42
ADLS_ACUITY_SCORE: 42
ADLS_ACUITY_SCORE: 46
ADLS_ACUITY_SCORE: 46
ADLS_ACUITY_SCORE: 42
ADLS_ACUITY_SCORE: 39
ADLS_ACUITY_SCORE: 39
ADLS_ACUITY_SCORE: 37
ADLS_ACUITY_SCORE: 60
ADLS_ACUITY_SCORE: 42
ADLS_ACUITY_SCORE: 46
ADLS_ACUITY_SCORE: 42
ADLS_ACUITY_SCORE: 46
ADLS_ACUITY_SCORE: 42
ADLS_ACUITY_SCORE: 42
ADLS_ACUITY_SCORE: 39
ADLS_ACUITY_SCORE: 46
ADLS_ACUITY_SCORE: 42
ADLS_ACUITY_SCORE: 37
ADLS_ACUITY_SCORE: 42

## 2025-08-23 ENCOUNTER — APPOINTMENT (OUTPATIENT)
Dept: OCCUPATIONAL THERAPY | Facility: SKILLED NURSING FACILITY | Age: 45
DRG: 196 | End: 2025-08-23
Attending: INTERNAL MEDICINE
Payer: COMMERCIAL

## 2025-08-23 ASSESSMENT — ACTIVITIES OF DAILY LIVING (ADL)
ADLS_ACUITY_SCORE: 39

## 2025-08-24 ENCOUNTER — APPOINTMENT (OUTPATIENT)
Dept: OCCUPATIONAL THERAPY | Facility: SKILLED NURSING FACILITY | Age: 45
DRG: 197 | End: 2025-08-24
Attending: INTERNAL MEDICINE
Payer: COMMERCIAL

## 2025-08-24 ENCOUNTER — APPOINTMENT (OUTPATIENT)
Dept: PHYSICAL THERAPY | Facility: SKILLED NURSING FACILITY | Age: 45
DRG: 197 | End: 2025-08-24
Attending: INTERNAL MEDICINE
Payer: COMMERCIAL

## 2025-08-24 ENCOUNTER — TELEPHONE (OUTPATIENT)
Dept: INFECTIOUS DISEASES | Facility: CLINIC | Age: 45
End: 2025-08-24
Payer: COMMERCIAL

## 2025-08-24 DIAGNOSIS — L02.415 ABSCESS OF RIGHT HIP: Primary | ICD-10-CM

## 2025-08-24 ASSESSMENT — ACTIVITIES OF DAILY LIVING (ADL)
ADLS_ACUITY_SCORE: 39

## 2025-08-25 ENCOUNTER — PATIENT OUTREACH (OUTPATIENT)
Dept: CARE COORDINATION | Facility: CLINIC | Age: 45
End: 2025-08-25

## 2025-08-25 ENCOUNTER — APPOINTMENT (OUTPATIENT)
Dept: OCCUPATIONAL THERAPY | Facility: SKILLED NURSING FACILITY | Age: 45
DRG: 197 | End: 2025-08-25
Attending: INTERNAL MEDICINE
Payer: COMMERCIAL

## 2025-08-25 ENCOUNTER — APPOINTMENT (OUTPATIENT)
Dept: PHYSICAL THERAPY | Facility: SKILLED NURSING FACILITY | Age: 45
DRG: 197 | End: 2025-08-25
Attending: INTERNAL MEDICINE
Payer: COMMERCIAL

## 2025-08-25 ASSESSMENT — ACTIVITIES OF DAILY LIVING (ADL)
ADLS_ACUITY_SCORE: 39

## 2025-08-26 ENCOUNTER — TELEPHONE (OUTPATIENT)
Dept: PULMONOLOGY | Facility: CLINIC | Age: 45
End: 2025-08-26

## 2025-08-26 ENCOUNTER — PRE VISIT (OUTPATIENT)
Dept: INFECTIOUS DISEASES | Facility: CLINIC | Age: 45
End: 2025-08-26

## 2025-08-26 ENCOUNTER — APPOINTMENT (OUTPATIENT)
Dept: OCCUPATIONAL THERAPY | Facility: SKILLED NURSING FACILITY | Age: 45
DRG: 197 | End: 2025-08-26
Attending: INTERNAL MEDICINE
Payer: COMMERCIAL

## 2025-08-26 ENCOUNTER — OFFICE VISIT (OUTPATIENT)
Dept: INFECTIOUS DISEASES | Facility: CLINIC | Age: 45
End: 2025-08-26
Attending: STUDENT IN AN ORGANIZED HEALTH CARE EDUCATION/TRAINING PROGRAM
Payer: COMMERCIAL

## 2025-08-26 VITALS
HEIGHT: 72 IN | DIASTOLIC BLOOD PRESSURE: 81 MMHG | SYSTOLIC BLOOD PRESSURE: 129 MMHG | OXYGEN SATURATION: 100 % | RESPIRATION RATE: 18 BRPM | WEIGHT: 315 LBS | BODY MASS INDEX: 42.66 KG/M2 | HEART RATE: 73 BPM

## 2025-08-26 DIAGNOSIS — Z79.2 ENCOUNTER FOR LONG-TERM (CURRENT) USE OF ANTIBIOTICS: ICD-10-CM

## 2025-08-26 DIAGNOSIS — Z45.2 PICC (PERIPHERALLY INSERTED CENTRAL CATHETER) IN PLACE: ICD-10-CM

## 2025-08-26 DIAGNOSIS — J82.81 EOSINOPHILIC PNEUMONIA (H): ICD-10-CM

## 2025-08-26 DIAGNOSIS — R79.82 ELEVATED C-REACTIVE PROTEIN (CRP): ICD-10-CM

## 2025-08-26 DIAGNOSIS — Z51.81 THERAPEUTIC DRUG MONITORING: ICD-10-CM

## 2025-08-26 DIAGNOSIS — Z88.1 ALLERGY TO ANTIBIOTIC: ICD-10-CM

## 2025-08-26 DIAGNOSIS — T84.51XD INFECTION ASSOCIATED WITH INTERNAL RIGHT HIP PROSTHESIS, SUBSEQUENT ENCOUNTER: Primary | ICD-10-CM

## 2025-08-26 PROCEDURE — G0463 HOSPITAL OUTPT CLINIC VISIT: HCPCS | Performed by: STUDENT IN AN ORGANIZED HEALTH CARE EDUCATION/TRAINING PROGRAM

## 2025-08-26 ASSESSMENT — ACTIVITIES OF DAILY LIVING (ADL)
ADLS_ACUITY_SCORE: 41
ADLS_ACUITY_SCORE: 39
ADLS_ACUITY_SCORE: 41
ADLS_ACUITY_SCORE: 39
ADLS_ACUITY_SCORE: 42
ADLS_ACUITY_SCORE: 41
ADLS_ACUITY_SCORE: 39
ADLS_ACUITY_SCORE: 42
ADLS_ACUITY_SCORE: 42
ADLS_ACUITY_SCORE: 39
ADLS_ACUITY_SCORE: 42
ADLS_ACUITY_SCORE: 39
ADLS_ACUITY_SCORE: 39
ADLS_ACUITY_SCORE: 41
ADLS_ACUITY_SCORE: 41
ADLS_ACUITY_SCORE: 42
ADLS_ACUITY_SCORE: 39

## 2025-08-26 ASSESSMENT — PAIN SCALES - GENERAL: PAINLEVEL_OUTOF10: SEVERE PAIN (9)

## 2025-08-27 ASSESSMENT — ACTIVITIES OF DAILY LIVING (ADL)
ADLS_ACUITY_SCORE: 41

## 2025-08-28 ENCOUNTER — APPOINTMENT (OUTPATIENT)
Dept: OCCUPATIONAL THERAPY | Facility: SKILLED NURSING FACILITY | Age: 45
DRG: 197 | End: 2025-08-28
Attending: INTERNAL MEDICINE
Payer: COMMERCIAL

## 2025-08-28 ASSESSMENT — ACTIVITIES OF DAILY LIVING (ADL)
ADLS_ACUITY_SCORE: 41

## 2025-08-29 ENCOUNTER — APPOINTMENT (OUTPATIENT)
Dept: PHYSICAL THERAPY | Facility: SKILLED NURSING FACILITY | Age: 45
DRG: 197 | End: 2025-08-29
Attending: INTERNAL MEDICINE
Payer: COMMERCIAL

## 2025-08-29 ENCOUNTER — APPOINTMENT (OUTPATIENT)
Dept: OCCUPATIONAL THERAPY | Facility: SKILLED NURSING FACILITY | Age: 45
DRG: 197 | End: 2025-08-29
Attending: INTERNAL MEDICINE
Payer: COMMERCIAL

## 2025-08-29 ASSESSMENT — ACTIVITIES OF DAILY LIVING (ADL)
ADLS_ACUITY_SCORE: 42
ADLS_ACUITY_SCORE: 41
ADLS_ACUITY_SCORE: 41
ADLS_ACUITY_SCORE: 42
ADLS_ACUITY_SCORE: 41
ADLS_ACUITY_SCORE: 42
ADLS_ACUITY_SCORE: 41
ADLS_ACUITY_SCORE: 42
ADLS_ACUITY_SCORE: 41
ADLS_ACUITY_SCORE: 42
ADLS_ACUITY_SCORE: 41
ADLS_ACUITY_SCORE: 42
ADLS_ACUITY_SCORE: 41

## 2025-08-30 ASSESSMENT — ACTIVITIES OF DAILY LIVING (ADL)
ADLS_ACUITY_SCORE: 42

## 2025-08-31 ASSESSMENT — ACTIVITIES OF DAILY LIVING (ADL)
ADLS_ACUITY_SCORE: 42

## 2025-09-01 ASSESSMENT — ACTIVITIES OF DAILY LIVING (ADL)
ADLS_ACUITY_SCORE: 42

## 2025-09-02 ENCOUNTER — TELEPHONE (OUTPATIENT)
Dept: INFECTIOUS DISEASES | Facility: CLINIC | Age: 45
End: 2025-09-02
Payer: COMMERCIAL

## 2025-09-02 ENCOUNTER — ENROLLMENT (OUTPATIENT)
Dept: HOME HEALTH SERVICES | Facility: HOME HEALTH | Age: 45
End: 2025-09-02
Payer: COMMERCIAL

## 2025-09-02 DIAGNOSIS — T84.51XD INFECTION ASSOCIATED WITH INTERNAL RIGHT HIP PROSTHESIS, SUBSEQUENT ENCOUNTER: Primary | ICD-10-CM

## 2025-09-02 DIAGNOSIS — Z79.2 ENCOUNTER FOR LONG-TERM (CURRENT) USE OF ANTIBIOTICS: ICD-10-CM

## 2025-09-02 ASSESSMENT — ACTIVITIES OF DAILY LIVING (ADL)
ADLS_ACUITY_SCORE: 42

## 2025-09-03 ENCOUNTER — TELEPHONE (OUTPATIENT)
Dept: INFECTIOUS DISEASES | Facility: CLINIC | Age: 45
End: 2025-09-03
Payer: COMMERCIAL

## 2025-09-03 ENCOUNTER — HOME INFUSION BILLING (OUTPATIENT)
Dept: HOME HEALTH SERVICES | Facility: HOME HEALTH | Age: 45
End: 2025-09-03
Payer: COMMERCIAL

## 2025-09-03 ENCOUNTER — HOME INFUSION (OUTPATIENT)
Dept: HOME HEALTH SERVICES | Facility: HOME HEALTH | Age: 45
End: 2025-09-03
Payer: COMMERCIAL

## 2025-09-03 ENCOUNTER — OFFICE VISIT (OUTPATIENT)
Dept: PULMONOLOGY | Facility: CLINIC | Age: 45
End: 2025-09-03
Attending: STUDENT IN AN ORGANIZED HEALTH CARE EDUCATION/TRAINING PROGRAM
Payer: COMMERCIAL

## 2025-09-03 ENCOUNTER — TELEPHONE (OUTPATIENT)
Dept: ENDOCRINOLOGY | Facility: CLINIC | Age: 45
End: 2025-09-03

## 2025-09-03 ENCOUNTER — HOME CARE VISIT (OUTPATIENT)
Dept: HOME HEALTH SERVICES | Facility: HOME HEALTH | Age: 45
End: 2025-09-03
Payer: COMMERCIAL

## 2025-09-03 VITALS
TEMPERATURE: 98 F | RESPIRATION RATE: 16 BRPM | HEART RATE: 82 BPM | OXYGEN SATURATION: 98 % | SYSTOLIC BLOOD PRESSURE: 130 MMHG | DIASTOLIC BLOOD PRESSURE: 80 MMHG

## 2025-09-03 VITALS
BODY MASS INDEX: 42.66 KG/M2 | OXYGEN SATURATION: 97 % | HEART RATE: 81 BPM | TEMPERATURE: 97.3 F | WEIGHT: 315 LBS | RESPIRATION RATE: 18 BRPM | DIASTOLIC BLOOD PRESSURE: 82 MMHG | SYSTOLIC BLOOD PRESSURE: 124 MMHG | HEIGHT: 72 IN
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
## 2025-09-03 DIAGNOSIS — J96.01 ACUTE HYPOXEMIC RESPIRATORY FAILURE (H): ICD-10-CM

## 2025-09-03 DIAGNOSIS — T84.51XD INFECTION ASSOCIATED WITH INTERNAL RIGHT HIP PROSTHESIS, SUBSEQUENT ENCOUNTER: Primary | ICD-10-CM

## 2025-09-03 DIAGNOSIS — J82.81 EOSINOPHILIC PNEUMONIA (H): ICD-10-CM

## 2025-09-03 DIAGNOSIS — G47.33 OSA (OBSTRUCTIVE SLEEP APNEA): ICD-10-CM

## 2025-09-03 DIAGNOSIS — J96.01 ACUTE RESPIRATORY FAILURE WITH HYPOXIA (H): ICD-10-CM

## 2025-09-03 DIAGNOSIS — J18.9 HOSPITAL-ACQUIRED PNEUMONIA: ICD-10-CM

## 2025-09-03 PROCEDURE — G0463 HOSPITAL OUTPT CLINIC VISIT: HCPCS | Performed by: STUDENT IN AN ORGANIZED HEALTH CARE EDUCATION/TRAINING PROGRAM

## 2025-09-03 ASSESSMENT — ACTIVITIES OF DAILY LIVING (ADL)
ADLS_ACUITY_SCORE: 42

## 2025-09-03 ASSESSMENT — PAIN SCALES - GENERAL: PAINLEVEL_OUTOF10: NO PAIN (0)

## 2025-09-04 ENCOUNTER — TELEPHONE (OUTPATIENT)
Dept: INFECTIOUS DISEASES | Facility: CLINIC | Age: 45
End: 2025-09-04
Payer: COMMERCIAL

## 2025-09-04 ENCOUNTER — PATIENT OUTREACH (OUTPATIENT)
Dept: CARE COORDINATION | Facility: CLINIC | Age: 45
End: 2025-09-04
Payer: COMMERCIAL

## 2025-09-04 ENCOUNTER — TELEPHONE (OUTPATIENT)
Dept: FAMILY MEDICINE | Facility: CLINIC | Age: 45
End: 2025-09-04
Payer: COMMERCIAL

## 2025-09-04 ENCOUNTER — HOME INFUSION BILLING (OUTPATIENT)
Dept: HOME HEALTH SERVICES | Facility: HOME HEALTH | Age: 45
End: 2025-09-04
Payer: COMMERCIAL

## 2025-09-04 DIAGNOSIS — Z09 HOSPITAL DISCHARGE FOLLOW-UP: ICD-10-CM

## (undated) DEVICE — LINEN HALF SHEET 5512

## (undated) DEVICE — HOOD SURG T7PLUS PEEL AWAY FACE SHIELD STRL LF 0416-801-100

## (undated) DEVICE — GLOVE PROTEXIS BLUE W/NEU-THERA 7.0  2D73EB70

## (undated) DEVICE — LINEN ORTHO ACL PACK 5447

## (undated) DEVICE — LINEN FULL SHEET 5511

## (undated) DEVICE — LINEN DRAPE 54X72" 5467

## (undated) DEVICE — DRSG GAUZE 4X4" TRAY

## (undated) DEVICE — Device

## (undated) DEVICE — SOL WATER IRRIG 1000ML BOTTLE 2F7114

## (undated) DEVICE — BAG CLEAR TRASH 1.3M 39X33" P4040C

## (undated) DEVICE — SUTURE PDS 0 60IN CTX+ LOOPED PDP990G

## (undated) DEVICE — CATH TRAY FOLEY SURESTEP 16FR DRAIN BAG STATOCK A899916

## (undated) DEVICE — GLOVE PROTEXIS BLUE W/NEU-THERA 8.0  2D73EB80

## (undated) DEVICE — SOLUTION IRRIGATION 0.9% NACL 1000ML BOTTLE R5200-01

## (undated) DEVICE — ENDO FORCEP ENDOJAW BIOPSY 2.8MMX160CM FB-220K

## (undated) DEVICE — GLOVE BIOGEL PI SZ 7.5 40875

## (undated) DEVICE — SPONGE RAY-TEC 4X8" 7318

## (undated) DEVICE — GLOVE PROTEXIS POWDER FREE 7.5 ORTHOPEDIC 2D73ET75

## (undated) DEVICE — PACK TOTAL HIP RIDGES LATEX PO15HIFSG

## (undated) DEVICE — CLEANSER JET LAVAGE IRR 0.05% CHG ISEPT-450-USA

## (undated) DEVICE — CLEANSER JET LAVAGE IRRISEPT 0.05% CHG IRRISEPT45USA

## (undated) DEVICE — GLOVE PROTEXIS POWDER FREE 7.0 ORTHO LIME 2D73ET70

## (undated) DEVICE — SPONGE LAP 18X18" X8435

## (undated) DEVICE — GOWN XLG DISP 9545

## (undated) DEVICE — SU STRATAFIX PDS PLUS 1 CT-1 12" SXPP1A443

## (undated) DEVICE — PREP CHLORAPREP 26ML TINTED HI-LITE ORANGE 930815

## (undated) DEVICE — GLOVE PROTEXIS POWDER FREE 7.0 ORTHOPEDIC 2D73ET70

## (undated) DEVICE — GLOVE BIOGEL PI MICRO INDICATOR UNDERGLOVE SZ 8.0 48980

## (undated) DEVICE — GLOVE PROTEXIS POWDER FREE ORANGE 8.0  2D72PT80X

## (undated) DEVICE — SU ETHILON 2-0 PS 18" 585H

## (undated) DEVICE — SOLUTION IV IRRIGATION 0.9% NACL 3L R8206

## (undated) DEVICE — BLADE SAW SAGITTAL STRK 25X90X1.27MM HD SYS 6 6125-127-090

## (undated) DEVICE — GLOVE PROTEXIS POWDER FREE SMT 8.0  2D72PT80X

## (undated) DEVICE — TUBING SUCTION MEDI-VAC SOFT 3/16"X20' N520A

## (undated) DEVICE — DRAPE LEGGINGS 8421

## (undated) DEVICE — SUCTION MANIFOLD NEPTUNE 2 SYS 4 PORT 0702-020-000

## (undated) DEVICE — DRILL BIT BIOM QUICK CONNECTING RING LOCK 3.2X20MM 31-323220

## (undated) DEVICE — SUCTION TIP YANKAUER W/O VENT K86

## (undated) DEVICE — SUCTION CANISTER MEDIVAC LINER 3000ML W/LID 65651-530

## (undated) DEVICE — DRILL BIT HANDINN 2.5MM DB2.5

## (undated) DEVICE — GLOVE BIOGEL PI MICRO INDICATOR UNDERGLOVE SZ 7.0 48970

## (undated) DEVICE — SU ETHIBOND 5 V-37 4X30" MB66G

## (undated) DEVICE — IMM PILLOW ABDUCT HIP MED M60-025-M

## (undated) DEVICE — DRSG AQUACEL AG 3.5X13.75" HYDROFIBER 412012

## (undated) DEVICE — DRAPE IOBAN INCISE 23X17" 6650EZ

## (undated) DEVICE — ESU ELEC BLADE 6" COATED E1450-6

## (undated) DEVICE — SUTURE MONOCRYL+ 2-0 CT-1 36" UNDYED MCP945H

## (undated) DEVICE — DRAPE STERI TOWEL LG 1010

## (undated) DEVICE — SU VICRYL+ 0 CT 36" DYED VCP358H

## (undated) DEVICE — SOL BACTISURE WOUND LAVAGE 1L BAG 00-8887-002-00

## (undated) DEVICE — PAD CHUX UNDERPAD 30X36" P3036C

## (undated) DEVICE — GOWN IMPERVIOUS SPECIALTY XLG/XLONG 32474

## (undated) DEVICE — PREP CHLORAPREP 26ML TINTED ORANGE  260815

## (undated) DEVICE — BAG RED BIOHAZARD 37X50" 40GAL A7450PR

## (undated) DEVICE — SUTURE VICRYL+ 0 CT-1 18" DYED VIO VCP740D

## (undated) DEVICE — BNDG COBAN 6"X5YDS STERILE

## (undated) DEVICE — STPL SKIN 35W 6.9MM  PXW35

## (undated) DEVICE — ENDO FORCEP TRIPOD GRASPING 2.8MMX230CM FG-600U

## (undated) DEVICE — TRAY PREP DRY SKIN SCRUB 067

## (undated) DEVICE — GLOVE BIOGEL PI ULTRATOUCH SZ 8.0 41180

## (undated) DEVICE — DRSG ADAPTIC 3X8" 6113

## (undated) DEVICE — GLOVE BIOGEL PI SZ 7.0 40870

## (undated) DEVICE — SU VICRYL+ 1 MO-4 18" DYED VCP702D

## (undated) DEVICE — ENDO BITE BLOCK ADULT OLYMPUS LATEX FREE MAJ-1632

## (undated) DEVICE — SET HANDPIECE INTERPULSE W/COAXIAL FAN SPRAY TIP 0210118000

## (undated) DEVICE — SOL NACL 0.9% INJ 1000ML BAG 07983-09

## (undated) DEVICE — APPLICATORS COTTON TIP 6"X2 STERILE LF C15053-006

## (undated) DEVICE — DRSG ABDOMINAL 07 1/2X8" 7197D

## (undated) DEVICE — SU VICRYL 0 CT-1 27" J340H

## (undated) DEVICE — HOOD FLYTE W/PEELAWAY 408-800-100

## (undated) DEVICE — SOL NACL 0.9% IRRIG 3000ML BAG 2B7477

## (undated) DEVICE — KIT ENDO TURNOVER/PROCEDURE W/CLEAN A SCOPE LINERS 103888

## (undated) DEVICE — TUBE CULTURE AEROBIC/ANAEROBIC W/O SWABS A.C.T.I.1. 12401

## (undated) RX ORDER — LIDOCAINE HYDROCHLORIDE 10 MG/ML
INJECTION, SOLUTION EPIDURAL; INFILTRATION; INTRACAUDAL; PERINEURAL
Status: DISPENSED
Start: 2022-11-04

## (undated) RX ORDER — LIDOCAINE HYDROCHLORIDE 10 MG/ML
INJECTION, SOLUTION EPIDURAL; INFILTRATION; INTRACAUDAL; PERINEURAL
Status: DISPENSED
Start: 2020-03-12

## (undated) RX ORDER — FENTANYL CITRATE-0.9 % NACL/PF 10 MCG/ML
PLASTIC BAG, INJECTION (ML) INTRAVENOUS
Status: DISPENSED
Start: 2025-07-21

## (undated) RX ORDER — CEFAZOLIN SODIUM/WATER 3 G/30 ML
SYRINGE (ML) INTRAVENOUS
Status: DISPENSED
Start: 2025-07-29

## (undated) RX ORDER — BUPIVACAINE HYDROCHLORIDE 5 MG/ML
INJECTION, SOLUTION EPIDURAL; INTRACAUDAL
Status: DISPENSED
Start: 2022-11-04

## (undated) RX ORDER — TRANEXAMIC ACID 10 MG/ML
INJECTION, SOLUTION INTRAVENOUS
Status: DISPENSED
Start: 2025-07-21

## (undated) RX ORDER — PROPOFOL 10 MG/ML
INJECTION, EMULSION INTRAVENOUS
Status: DISPENSED
Start: 2020-10-28

## (undated) RX ORDER — ONDANSETRON 2 MG/ML
INJECTION INTRAMUSCULAR; INTRAVENOUS
Status: DISPENSED
Start: 2019-06-10

## (undated) RX ORDER — LIDOCAINE HYDROCHLORIDE 10 MG/ML
INJECTION, SOLUTION EPIDURAL; INFILTRATION; INTRACAUDAL; PERINEURAL
Status: DISPENSED
Start: 2025-07-29

## (undated) RX ORDER — PROPOFOL 10 MG/ML
INJECTION, EMULSION INTRAVENOUS
Status: DISPENSED
Start: 2025-07-21

## (undated) RX ORDER — PROPOFOL 10 MG/ML
INJECTION, EMULSION INTRAVENOUS
Status: DISPENSED
Start: 2019-06-24

## (undated) RX ORDER — GLYCOPYRROLATE 0.2 MG/ML
INJECTION INTRAMUSCULAR; INTRAVENOUS
Status: DISPENSED
Start: 2019-06-24

## (undated) RX ORDER — VANCOMYCIN HYDROCHLORIDE 1 G/20ML
INJECTION, POWDER, LYOPHILIZED, FOR SOLUTION INTRAVENOUS
Status: DISPENSED
Start: 2025-07-21

## (undated) RX ORDER — VANCOMYCIN HYDROCHLORIDE 1 G/20ML
INJECTION, POWDER, LYOPHILIZED, FOR SOLUTION INTRAVENOUS
Status: DISPENSED
Start: 2025-04-14

## (undated) RX ORDER — KETOROLAC TROMETHAMINE 30 MG/ML
INJECTION, SOLUTION INTRAMUSCULAR; INTRAVENOUS
Status: DISPENSED
Start: 2025-04-14

## (undated) RX ORDER — GLYCOPYRROLATE 0.2 MG/ML
INJECTION, SOLUTION INTRAMUSCULAR; INTRAVENOUS
Status: DISPENSED
Start: 2025-04-14

## (undated) RX ORDER — TOBRAMYCIN 1.2 G/30ML
INJECTION, POWDER, LYOPHILIZED, FOR SOLUTION INTRAVENOUS
Status: DISPENSED
Start: 2025-07-21

## (undated) RX ORDER — CELECOXIB 200 MG/1
CAPSULE ORAL
Status: DISPENSED
Start: 2025-04-14

## (undated) RX ORDER — FENTANYL CITRATE 50 UG/ML
INJECTION, SOLUTION INTRAMUSCULAR; INTRAVENOUS
Status: DISPENSED
Start: 2025-07-21

## (undated) RX ORDER — CELECOXIB 200 MG/1
CAPSULE ORAL
Status: DISPENSED
Start: 2019-06-24

## (undated) RX ORDER — LIDOCAINE HYDROCHLORIDE 10 MG/ML
INJECTION, SOLUTION EPIDURAL; INFILTRATION; INTRACAUDAL; PERINEURAL
Status: DISPENSED
Start: 2019-06-10

## (undated) RX ORDER — DEXAMETHASONE SODIUM PHOSPHATE 4 MG/ML
INJECTION, SOLUTION INTRA-ARTICULAR; INTRALESIONAL; INTRAMUSCULAR; INTRAVENOUS; SOFT TISSUE
Status: DISPENSED
Start: 2025-07-21

## (undated) RX ORDER — DEXAMETHASONE SODIUM PHOSPHATE 4 MG/ML
INJECTION, SOLUTION INTRA-ARTICULAR; INTRALESIONAL; INTRAMUSCULAR; INTRAVENOUS; SOFT TISSUE
Status: DISPENSED
Start: 2019-06-10

## (undated) RX ORDER — NEOSTIGMINE METHYLSULFATE 1 MG/ML
VIAL (ML) INJECTION
Status: DISPENSED
Start: 2019-06-24

## (undated) RX ORDER — ONDANSETRON 2 MG/ML
INJECTION INTRAMUSCULAR; INTRAVENOUS
Status: DISPENSED
Start: 2025-07-21

## (undated) RX ORDER — CEFAZOLIN SODIUM IN 0.9 % NACL 3 G/100 ML
INTRAVENOUS SOLUTION, PIGGYBACK (ML) INTRAVENOUS
Status: DISPENSED
Start: 2019-06-24

## (undated) RX ORDER — FENTANYL CITRATE-0.9 % NACL/PF 10 MCG/ML
PLASTIC BAG, INJECTION (ML) INTRAVENOUS
Status: DISPENSED
Start: 2025-07-29

## (undated) RX ORDER — LABETALOL HYDROCHLORIDE 5 MG/ML
INJECTION, SOLUTION INTRAVENOUS
Status: DISPENSED
Start: 2025-04-14

## (undated) RX ORDER — BUPIVACAINE HYDROCHLORIDE 5 MG/ML
INJECTION, SOLUTION EPIDURAL; INTRACAUDAL
Status: DISPENSED
Start: 2020-11-17

## (undated) RX ORDER — HYDROMORPHONE HCL IN WATER/PF 6 MG/30 ML
PATIENT CONTROLLED ANALGESIA SYRINGE INTRAVENOUS
Status: DISPENSED
Start: 2025-04-14

## (undated) RX ORDER — CEFAZOLIN SODIUM 2 G/100ML
INJECTION, SOLUTION INTRAVENOUS
Status: DISPENSED
Start: 2019-06-10

## (undated) RX ORDER — ONDANSETRON 2 MG/ML
INJECTION INTRAMUSCULAR; INTRAVENOUS
Status: DISPENSED
Start: 2025-07-29

## (undated) RX ORDER — FENTANYL CITRATE 50 UG/ML
INJECTION, SOLUTION INTRAMUSCULAR; INTRAVENOUS
Status: DISPENSED
Start: 2025-04-14

## (undated) RX ORDER — DEXAMETHASONE SODIUM PHOSPHATE 4 MG/ML
INJECTION, SOLUTION INTRA-ARTICULAR; INTRALESIONAL; INTRAMUSCULAR; INTRAVENOUS; SOFT TISSUE
Status: DISPENSED
Start: 2019-06-24

## (undated) RX ORDER — LIDOCAINE HYDROCHLORIDE 10 MG/ML
INJECTION, SOLUTION EPIDURAL; INFILTRATION; INTRACAUDAL; PERINEURAL
Status: DISPENSED
Start: 2025-04-14

## (undated) RX ORDER — ONDANSETRON 2 MG/ML
INJECTION INTRAMUSCULAR; INTRAVENOUS
Status: DISPENSED
Start: 2019-06-24

## (undated) RX ORDER — GLYCOPYRROLATE 0.2 MG/ML
INJECTION INTRAMUSCULAR; INTRAVENOUS
Status: DISPENSED
Start: 2019-06-10

## (undated) RX ORDER — DEXAMETHASONE SODIUM PHOSPHATE 4 MG/ML
INJECTION, SOLUTION INTRA-ARTICULAR; INTRALESIONAL; INTRAMUSCULAR; INTRAVENOUS; SOFT TISSUE
Status: DISPENSED
Start: 2025-04-14

## (undated) RX ORDER — PROPOFOL 10 MG/ML
INJECTION, EMULSION INTRAVENOUS
Status: DISPENSED
Start: 2020-03-12

## (undated) RX ORDER — TRANEXAMIC ACID 10 MG/ML
INJECTION, SOLUTION INTRAVENOUS
Status: DISPENSED
Start: 2025-07-29

## (undated) RX ORDER — ONDANSETRON 2 MG/ML
INJECTION INTRAMUSCULAR; INTRAVENOUS
Status: DISPENSED
Start: 2025-04-14

## (undated) RX ORDER — PROPOFOL 10 MG/ML
INJECTION, EMULSION INTRAVENOUS
Status: DISPENSED
Start: 2025-07-29

## (undated) RX ORDER — TRIAMCINOLONE ACETONIDE 40 MG/ML
INJECTION, SUSPENSION INTRA-ARTICULAR; INTRAMUSCULAR
Status: DISPENSED
Start: 2020-11-17

## (undated) RX ORDER — FENTANYL CITRATE 50 UG/ML
INJECTION, SOLUTION INTRAMUSCULAR; INTRAVENOUS
Status: DISPENSED
Start: 2019-06-24

## (undated) RX ORDER — OXYCODONE HYDROCHLORIDE 5 MG/1
TABLET ORAL
Status: DISPENSED
Start: 2025-04-14

## (undated) RX ORDER — FENTANYL CITRATE 50 UG/ML
INJECTION, SOLUTION INTRAMUSCULAR; INTRAVENOUS
Status: DISPENSED
Start: 2023-11-29

## (undated) RX ORDER — LIDOCAINE HYDROCHLORIDE 10 MG/ML
INJECTION, SOLUTION EPIDURAL; INFILTRATION; INTRACAUDAL; PERINEURAL
Status: DISPENSED
Start: 2025-07-21

## (undated) RX ORDER — TRANEXAMIC ACID 650 MG/1
TABLET ORAL
Status: DISPENSED
Start: 2025-04-14

## (undated) RX ORDER — FENTANYL CITRATE 50 UG/ML
INJECTION, SOLUTION INTRAMUSCULAR; INTRAVENOUS
Status: DISPENSED
Start: 2020-03-12

## (undated) RX ORDER — VANCOMYCIN HYDROCHLORIDE 1 G/20ML
INJECTION, POWDER, LYOPHILIZED, FOR SOLUTION INTRAVENOUS
Status: DISPENSED
Start: 2025-07-29

## (undated) RX ORDER — LIDOCAINE HYDROCHLORIDE 10 MG/ML
INJECTION, SOLUTION EPIDURAL; INFILTRATION; INTRACAUDAL; PERINEURAL
Status: DISPENSED
Start: 2019-06-24

## (undated) RX ORDER — PROPOFOL 10 MG/ML
INJECTION, EMULSION INTRAVENOUS
Status: DISPENSED
Start: 2019-06-10

## (undated) RX ORDER — NEOSTIGMINE METHYLSULFATE 1 MG/ML
VIAL (ML) INJECTION
Status: DISPENSED
Start: 2019-06-10

## (undated) RX ORDER — FENTANYL CITRATE 50 UG/ML
INJECTION, SOLUTION INTRAMUSCULAR; INTRAVENOUS
Status: DISPENSED
Start: 2019-06-10

## (undated) RX ORDER — VASOPRESSIN 20 [USP'U]/ML
INJECTION, SOLUTION INTRAVENOUS
Status: DISPENSED
Start: 2025-07-21

## (undated) RX ORDER — FENTANYL CITRATE 50 UG/ML
INJECTION, SOLUTION INTRAMUSCULAR; INTRAVENOUS
Status: DISPENSED
Start: 2022-03-08

## (undated) RX ORDER — CEFAZOLIN SODIUM/WATER 3 G/30 ML
SYRINGE (ML) INTRAVENOUS
Status: DISPENSED
Start: 2025-04-14

## (undated) RX ORDER — PANTOPRAZOLE SODIUM 40 MG/1
TABLET, DELAYED RELEASE ORAL
Status: DISPENSED
Start: 2019-06-24

## (undated) RX ORDER — TRIAMCINOLONE ACETONIDE 40 MG/ML
INJECTION, SUSPENSION INTRA-ARTICULAR; INTRAMUSCULAR
Status: DISPENSED
Start: 2022-11-04

## (undated) RX ORDER — CEFAZOLIN SODIUM/WATER 3 G/30 ML
SYRINGE (ML) INTRAVENOUS
Status: DISPENSED
Start: 2025-07-21

## (undated) RX ORDER — FENTANYL CITRATE 50 UG/ML
INJECTION, SOLUTION INTRAMUSCULAR; INTRAVENOUS
Status: DISPENSED
Start: 2025-07-29

## (undated) RX ORDER — LIDOCAINE HYDROCHLORIDE 10 MG/ML
INJECTION, SOLUTION EPIDURAL; INFILTRATION; INTRACAUDAL; PERINEURAL
Status: DISPENSED
Start: 2020-11-17